# Patient Record
Sex: FEMALE | Race: WHITE | NOT HISPANIC OR LATINO | Employment: PART TIME | URBAN - METROPOLITAN AREA
[De-identification: names, ages, dates, MRNs, and addresses within clinical notes are randomized per-mention and may not be internally consistent; named-entity substitution may affect disease eponyms.]

---

## 2017-01-18 ENCOUNTER — RESOLUTE PROFESSIONAL BILLING HOSPITAL PROF FEE (OUTPATIENT)
Dept: HOSPITALIST | Facility: MEDICAL CENTER | Age: 27
End: 2017-01-18
Payer: MEDICAID

## 2017-01-18 ENCOUNTER — HOSPITAL ENCOUNTER (INPATIENT)
Facility: MEDICAL CENTER | Age: 27
LOS: 1 days | DRG: 639 | End: 2017-01-19
Attending: EMERGENCY MEDICINE | Admitting: INTERNAL MEDICINE
Payer: MEDICAID

## 2017-01-18 ENCOUNTER — APPOINTMENT (OUTPATIENT)
Dept: RADIOLOGY | Facility: MEDICAL CENTER | Age: 27
DRG: 639 | End: 2017-01-18
Attending: EMERGENCY MEDICINE
Payer: MEDICAID

## 2017-01-18 DIAGNOSIS — R11.2 NAUSEA VOMITING AND DIARRHEA: ICD-10-CM

## 2017-01-18 DIAGNOSIS — R19.7 NAUSEA VOMITING AND DIARRHEA: ICD-10-CM

## 2017-01-18 DIAGNOSIS — E10.10 DIABETIC KETOACIDOSIS WITHOUT COMA ASSOCIATED WITH TYPE 1 DIABETES MELLITUS (HCC): ICD-10-CM

## 2017-01-18 LAB
ALBUMIN SERPL BCP-MCNC: 3.6 G/DL (ref 3.2–4.9)
ALBUMIN/GLOB SERPL: 1.2 G/DL
ALP SERPL-CCNC: 99 U/L (ref 30–99)
ALT SERPL-CCNC: 17 U/L (ref 2–50)
ANION GAP SERPL CALC-SCNC: 15 MMOL/L (ref 0–11.9)
ANION GAP SERPL CALC-SCNC: 7 MMOL/L (ref 0–11.9)
APPEARANCE UR: CLEAR
AST SERPL-CCNC: 15 U/L (ref 12–45)
B-OH-BUTYR SERPL-MCNC: 5.49 MMOL/L (ref 0.02–0.27)
BASE EXCESS BLDV CALC-SCNC: -11 MMOL/L
BASOPHILS # BLD AUTO: 0.4 % (ref 0–1.8)
BASOPHILS # BLD: 0.02 K/UL (ref 0–0.12)
BILIRUB SERPL-MCNC: 0.6 MG/DL (ref 0.1–1.5)
BILIRUB UR QL STRIP.AUTO: NEGATIVE
BODY TEMPERATURE: ABNORMAL CENTIGRADE
BUN SERPL-MCNC: 13 MG/DL (ref 8–22)
BUN SERPL-MCNC: 18 MG/DL (ref 8–22)
CALCIUM SERPL-MCNC: 7.3 MG/DL (ref 8.5–10.5)
CALCIUM SERPL-MCNC: 8.4 MG/DL (ref 8.5–10.5)
CHLORIDE SERPL-SCNC: 105 MMOL/L (ref 96–112)
CHLORIDE SERPL-SCNC: 115 MMOL/L (ref 96–112)
CO2 SERPL-SCNC: 13 MMOL/L (ref 20–33)
CO2 SERPL-SCNC: 18 MMOL/L (ref 20–33)
COLOR UR: COLORLESS
CREAT SERPL-MCNC: 0.46 MG/DL (ref 0.5–1.4)
CREAT SERPL-MCNC: 0.67 MG/DL (ref 0.5–1.4)
CULTURE IF INDICATED INDCX: NO UA CULTURE
EOSINOPHIL # BLD AUTO: 0.18 K/UL (ref 0–0.51)
EOSINOPHIL NFR BLD: 3.7 % (ref 0–6.9)
ERYTHROCYTE [DISTWIDTH] IN BLOOD BY AUTOMATED COUNT: 44 FL (ref 35.9–50)
GFR SERPL CREATININE-BSD FRML MDRD: >60 ML/MIN/1.73 M 2
GFR SERPL CREATININE-BSD FRML MDRD: >60 ML/MIN/1.73 M 2
GLOBULIN SER CALC-MCNC: 3 G/DL (ref 1.9–3.5)
GLUCOSE BLD-MCNC: 206 MG/DL (ref 65–99)
GLUCOSE BLD-MCNC: 243 MG/DL (ref 65–99)
GLUCOSE BLD-MCNC: 40 MG/DL (ref 65–99)
GLUCOSE BLD-MCNC: 81 MG/DL (ref 65–99)
GLUCOSE SERPL-MCNC: 378 MG/DL (ref 65–99)
GLUCOSE SERPL-MCNC: 92 MG/DL (ref 65–99)
GLUCOSE UR STRIP.AUTO-MCNC: >1000 MG/DL
HCG SERPL QL: NEGATIVE
HCO3 BLDV-SCNC: 14 MMOL/L (ref 24–28)
HCT VFR BLD AUTO: 45 % (ref 37–47)
HGB BLD-MCNC: 14.8 G/DL (ref 12–16)
IMM GRANULOCYTES # BLD AUTO: 0.03 K/UL (ref 0–0.11)
IMM GRANULOCYTES NFR BLD AUTO: 0.6 % (ref 0–0.9)
KETONES UR STRIP.AUTO-MCNC: 100 MG/DL
LEUKOCYTE ESTERASE UR QL STRIP.AUTO: NEGATIVE
LIPASE SERPL-CCNC: 7 U/L (ref 11–82)
LYMPHOCYTES # BLD AUTO: 1.5 K/UL (ref 1–4.8)
LYMPHOCYTES NFR BLD: 30.9 % (ref 22–41)
MAGNESIUM SERPL-MCNC: 1.7 MG/DL (ref 1.5–2.5)
MAGNESIUM SERPL-MCNC: 2 MG/DL (ref 1.5–2.5)
MCH RBC QN AUTO: 27.8 PG (ref 27–33)
MCHC RBC AUTO-ENTMCNC: 32.9 G/DL (ref 33.6–35)
MCV RBC AUTO: 84.6 FL (ref 81.4–97.8)
MICRO URNS: ABNORMAL
MONOCYTES # BLD AUTO: 0.5 K/UL (ref 0–0.85)
MONOCYTES NFR BLD AUTO: 10.3 % (ref 0–13.4)
NEUTROPHILS # BLD AUTO: 2.62 K/UL (ref 2–7.15)
NEUTROPHILS NFR BLD: 54.1 % (ref 44–72)
NITRITE UR QL STRIP.AUTO: NEGATIVE
NRBC # BLD AUTO: 0 K/UL
NRBC BLD AUTO-RTO: 0 /100 WBC
PCO2 BLDV: 29.1 MMHG (ref 41–51)
PH BLDV: 7.29 [PH] (ref 7.31–7.45)
PH UR STRIP.AUTO: 5.5 [PH]
PHOSPHATE SERPL-MCNC: 1.3 MG/DL (ref 2.5–4.5)
PHOSPHATE SERPL-MCNC: 2.6 MG/DL (ref 2.5–4.5)
PLATELET # BLD AUTO: 241 K/UL (ref 164–446)
PMV BLD AUTO: 10.2 FL (ref 9–12.9)
PO2 BLDV: 37.9 MMHG (ref 25–40)
POTASSIUM SERPL-SCNC: 3.2 MMOL/L (ref 3.6–5.5)
POTASSIUM SERPL-SCNC: 4.3 MMOL/L (ref 3.6–5.5)
PROT SERPL-MCNC: 6.6 G/DL (ref 6–8.2)
PROT UR QL STRIP: NEGATIVE MG/DL
RBC # BLD AUTO: 5.32 M/UL (ref 4.2–5.4)
RBC UR QL AUTO: NEGATIVE
SAO2 % BLDV: 74.1 %
SODIUM SERPL-SCNC: 133 MMOL/L (ref 135–145)
SODIUM SERPL-SCNC: 140 MMOL/L (ref 135–145)
SP GR UR STRIP.AUTO: 1.03
WBC # BLD AUTO: 4.9 K/UL (ref 4.8–10.8)

## 2017-01-18 PROCEDURE — 80053 COMPREHEN METABOLIC PANEL: CPT | Mod: EDC

## 2017-01-18 PROCEDURE — 99223 1ST HOSP IP/OBS HIGH 75: CPT | Performed by: INTERNAL MEDICINE

## 2017-01-18 PROCEDURE — 36415 COLL VENOUS BLD VENIPUNCTURE: CPT | Mod: EDC

## 2017-01-18 PROCEDURE — 700111 HCHG RX REV CODE 636 W/ 250 OVERRIDE (IP): Mod: EDC | Performed by: EMERGENCY MEDICINE

## 2017-01-18 PROCEDURE — 770022 HCHG ROOM/CARE - ICU (200): Mod: EDC

## 2017-01-18 PROCEDURE — A9270 NON-COVERED ITEM OR SERVICE: HCPCS | Mod: EDC | Performed by: INTERNAL MEDICINE

## 2017-01-18 PROCEDURE — 84703 CHORIONIC GONADOTROPIN ASSAY: CPT | Mod: EDC

## 2017-01-18 PROCEDURE — 700102 HCHG RX REV CODE 250 W/ 637 OVERRIDE(OP): Mod: EDC | Performed by: INTERNAL MEDICINE

## 2017-01-18 PROCEDURE — 700111 HCHG RX REV CODE 636 W/ 250 OVERRIDE (IP): Mod: EDC | Performed by: INTERNAL MEDICINE

## 2017-01-18 PROCEDURE — 81003 URINALYSIS AUTO W/O SCOPE: CPT | Mod: EDC

## 2017-01-18 PROCEDURE — 99291 CRITICAL CARE FIRST HOUR: CPT | Mod: EDC

## 2017-01-18 PROCEDURE — 71010 DX-CHEST-PORTABLE (1 VIEW): CPT

## 2017-01-18 PROCEDURE — 83690 ASSAY OF LIPASE: CPT | Mod: EDC

## 2017-01-18 PROCEDURE — 700105 HCHG RX REV CODE 258: Mod: EDC | Performed by: EMERGENCY MEDICINE

## 2017-01-18 PROCEDURE — 96374 THER/PROPH/DIAG INJ IV PUSH: CPT | Mod: EDC

## 2017-01-18 PROCEDURE — 84100 ASSAY OF PHOSPHORUS: CPT | Mod: EDC

## 2017-01-18 PROCEDURE — 85025 COMPLETE CBC W/AUTO DIFF WBC: CPT | Mod: EDC

## 2017-01-18 PROCEDURE — 83735 ASSAY OF MAGNESIUM: CPT | Mod: EDC

## 2017-01-18 PROCEDURE — 96361 HYDRATE IV INFUSION ADD-ON: CPT | Mod: EDC

## 2017-01-18 PROCEDURE — 700101 HCHG RX REV CODE 250: Mod: EDC | Performed by: INTERNAL MEDICINE

## 2017-01-18 PROCEDURE — 96375 TX/PRO/DX INJ NEW DRUG ADDON: CPT | Mod: EDC

## 2017-01-18 PROCEDURE — 82010 KETONE BODYS QUAN: CPT | Mod: EDC

## 2017-01-18 PROCEDURE — 82803 BLOOD GASES ANY COMBINATION: CPT | Mod: EDC

## 2017-01-18 PROCEDURE — 93005 ELECTROCARDIOGRAM TRACING: CPT | Mod: EDC | Performed by: INTERNAL MEDICINE

## 2017-01-18 PROCEDURE — 94760 N-INVAS EAR/PLS OXIMETRY 1: CPT | Mod: EDC

## 2017-01-18 PROCEDURE — 80048 BASIC METABOLIC PNL TOTAL CA: CPT | Mod: EDC

## 2017-01-18 PROCEDURE — 82962 GLUCOSE BLOOD TEST: CPT | Mod: EDC

## 2017-01-18 PROCEDURE — 700105 HCHG RX REV CODE 258: Mod: EDC | Performed by: INTERNAL MEDICINE

## 2017-01-18 RX ORDER — MAGNESIUM SULFATE HEPTAHYDRATE 40 MG/ML
2 INJECTION, SOLUTION INTRAVENOUS
Status: COMPLETED | OUTPATIENT
Start: 2017-01-18 | End: 2017-01-19

## 2017-01-18 RX ORDER — ATORVASTATIN CALCIUM 20 MG/1
20 TABLET, FILM COATED ORAL NIGHTLY
COMMUNITY
End: 2017-09-20

## 2017-01-18 RX ORDER — DEXTROSE AND SODIUM CHLORIDE 5; .45 G/100ML; G/100ML
INJECTION, SOLUTION INTRAVENOUS CONTINUOUS
Status: DISCONTINUED | OUTPATIENT
Start: 2017-01-18 | End: 2017-01-19

## 2017-01-18 RX ORDER — METOCLOPRAMIDE HYDROCHLORIDE 5 MG/5ML
10 SOLUTION ORAL
Status: ON HOLD | COMMUNITY
End: 2017-08-13

## 2017-01-18 RX ORDER — SODIUM CHLORIDE 9 MG/ML
2000 INJECTION, SOLUTION INTRAVENOUS ONCE
Status: COMPLETED | OUTPATIENT
Start: 2017-01-18 | End: 2017-01-18

## 2017-01-18 RX ORDER — MAGNESIUM SULFATE HEPTAHYDRATE 40 MG/ML
4 INJECTION, SOLUTION INTRAVENOUS
Status: DISCONTINUED | OUTPATIENT
Start: 2017-01-18 | End: 2017-01-19

## 2017-01-18 RX ORDER — ONDANSETRON 2 MG/ML
4 INJECTION INTRAMUSCULAR; INTRAVENOUS EVERY 4 HOURS PRN
Status: DISCONTINUED | OUTPATIENT
Start: 2017-01-18 | End: 2017-01-19 | Stop reason: HOSPADM

## 2017-01-18 RX ORDER — ONDANSETRON 4 MG/1
4 TABLET, ORALLY DISINTEGRATING ORAL EVERY 4 HOURS PRN
Status: DISCONTINUED | OUTPATIENT
Start: 2017-01-18 | End: 2017-01-19 | Stop reason: HOSPADM

## 2017-01-18 RX ORDER — IBUPROFEN 200 MG
600 TABLET ORAL EVERY 6 HOURS PRN
COMMUNITY
End: 2017-01-18

## 2017-01-18 RX ORDER — MORPHINE SULFATE 4 MG/ML
2 INJECTION, SOLUTION INTRAMUSCULAR; INTRAVENOUS EVERY 4 HOURS PRN
Status: DISCONTINUED | OUTPATIENT
Start: 2017-01-18 | End: 2017-01-19 | Stop reason: HOSPADM

## 2017-01-18 RX ORDER — INSULIN GLARGINE 100 [IU]/ML
25 INJECTION, SOLUTION SUBCUTANEOUS 2 TIMES DAILY
Status: DISCONTINUED | OUTPATIENT
Start: 2017-01-18 | End: 2017-01-19

## 2017-01-18 RX ORDER — ACETAMINOPHEN 325 MG/1
650 TABLET ORAL EVERY 6 HOURS PRN
Status: DISCONTINUED | OUTPATIENT
Start: 2017-01-18 | End: 2017-01-19 | Stop reason: HOSPADM

## 2017-01-18 RX ORDER — SODIUM CHLORIDE 9 MG/ML
INJECTION, SOLUTION INTRAVENOUS CONTINUOUS
Status: DISCONTINUED | OUTPATIENT
Start: 2017-01-18 | End: 2017-01-19

## 2017-01-18 RX ORDER — POTASSIUM CHLORIDE 7.45 MG/ML
10 INJECTION INTRAVENOUS
Status: COMPLETED | OUTPATIENT
Start: 2017-01-18 | End: 2017-01-19

## 2017-01-18 RX ORDER — SODIUM CHLORIDE 9 MG/ML
1000 INJECTION, SOLUTION INTRAVENOUS ONCE
Status: COMPLETED | OUTPATIENT
Start: 2017-01-18 | End: 2017-01-18

## 2017-01-18 RX ORDER — DEXTROSE MONOHYDRATE 25 G/50ML
25 INJECTION, SOLUTION INTRAVENOUS
Status: DISCONTINUED | OUTPATIENT
Start: 2017-01-18 | End: 2017-01-19 | Stop reason: HOSPADM

## 2017-01-18 RX ORDER — DEXTROSE AND SODIUM CHLORIDE 10; .45 G/100ML; G/100ML
INJECTION, SOLUTION INTRAVENOUS CONTINUOUS
Status: DISPENSED | OUTPATIENT
Start: 2017-01-18 | End: 2017-01-19

## 2017-01-18 RX ORDER — ONDANSETRON 2 MG/ML
4 INJECTION INTRAMUSCULAR; INTRAVENOUS ONCE
Status: COMPLETED | OUTPATIENT
Start: 2017-01-18 | End: 2017-01-18

## 2017-01-18 RX ADMIN — HYDROMORPHONE HYDROCHLORIDE 1 MG: 1 INJECTION, SOLUTION INTRAMUSCULAR; INTRAVENOUS; SUBCUTANEOUS at 13:46

## 2017-01-18 RX ADMIN — SODIUM CHLORIDE 2000 ML: 9 INJECTION, SOLUTION INTRAVENOUS at 18:34

## 2017-01-18 RX ADMIN — SODIUM CHLORIDE 1000 ML: 9 INJECTION, SOLUTION INTRAVENOUS at 13:46

## 2017-01-18 RX ADMIN — SODIUM CHLORIDE 8 UNITS/HR: 9 INJECTION, SOLUTION INTRAVENOUS at 20:17

## 2017-01-18 RX ADMIN — ACETAMINOPHEN 650 MG: 325 TABLET, FILM COATED ORAL at 20:16

## 2017-01-18 RX ADMIN — ONDANSETRON 4 MG: 2 INJECTION, SOLUTION INTRAMUSCULAR; INTRAVENOUS at 13:46

## 2017-01-18 RX ADMIN — DEXTROSE AND SODIUM CHLORIDE: 10; .45 INJECTION, SOLUTION INTRAVENOUS at 22:58

## 2017-01-18 RX ADMIN — DEXTROSE MONOHYDRATE 25 ML: 500 INJECTION PARENTERAL at 23:00

## 2017-01-18 RX ADMIN — INSULIN HUMAN 7 UNITS: 100 INJECTION, SOLUTION PARENTERAL at 18:32

## 2017-01-18 RX ADMIN — MORPHINE SULFATE 2 MG: 4 INJECTION INTRAVENOUS at 22:55

## 2017-01-18 RX ADMIN — POTASSIUM CHLORIDE 10 MEQ: 7.46 INJECTION, SOLUTION INTRAVENOUS at 23:08

## 2017-01-18 ASSESSMENT — LIFESTYLE VARIABLES: DO YOU DRINK ALCOHOL: NO

## 2017-01-18 ASSESSMENT — PAIN SCALES - GENERAL
PAINLEVEL_OUTOF10: 7
PAINLEVEL_OUTOF10: 2
PAINLEVEL_OUTOF10: 8
PAINLEVEL_OUTOF10: 7

## 2017-01-18 ASSESSMENT — PATIENT HEALTH QUESTIONNAIRE - PHQ9
SUM OF ALL RESPONSES TO PHQ9 QUESTIONS 1 AND 2: 0
1. LITTLE INTEREST OR PLEASURE IN DOING THINGS: NOT AT ALL
2. FEELING DOWN, DEPRESSED, IRRITABLE, OR HOPELESS: NOT AT ALL
SUM OF ALL RESPONSES TO PHQ QUESTIONS 1-9: 0

## 2017-01-18 NOTE — ED NOTES
"Med rec updated and complete  Allergies reviewed  Pt was not sure of her cholesterol.  Called [facility] @ [phone] to verify name and strength.  Pt states \"No antibiotics in the last 30 days\".       "

## 2017-01-18 NOTE — ED PROVIDER NOTES
ED Provider Note    Scribed for Jeb Torres M.D. by Esperanza Sierra. 1/18/2017  12:32 PM    Primary care provider: Pcp Not In Computer  Means of arrival: Walk in  History obtained from: patient  History limited by: none    CHIEF COMPLAINT  Chief Complaint   Patient presents with   • N/V   • Low Back Pain   • Abdominal Pain       HPI  Alis Sparks is a 26 y.o. female who presents to the Emergency Department for vomiting, low back pain, and abdominal pain onset 2 days ago. She tells me her pain is severe and worsening today. She describes multiple episodes of vomiting since onset. She reports associated diarrhea that subsided yesterday. The patient denies any dysuria. She reports she is a diabetic who uses insulin. She tells me her sugars have been between 200-275 this week. She reports no other pertinent medical information.      REVIEW OF SYSTEMS  Pertinent positives include vomiting, diarrhea, low back pain, and abdominal pain.   Pertinent negatives include no trouble breathing.    All other systems reviewed and negative.      PAST MEDICAL HISTORY   has a past medical history of Diabetes type 1, controlled; DKA (diabetic ketoacidoses); UTI (urinary tract infection); Kidney infection; Pneumonia; Backpain; Bronchitis; Fall; and Gastroparesis.    SURGICAL HISTORY   has past surgical history that includes gastroscopy-endo (9/18/2014); gastroscopy with biopsy (9/18/2014); and other.    SOCIAL HISTORY  Social History   Substance Use Topics   • Smoking status: Never Smoker    • Smokeless tobacco: Never Used   • Alcohol Use: No      History   Drug Use No       FAMILY HISTORY  Family History   Problem Relation Age of Onset   • Cancer       breasts, multiple family members       CURRENT MEDICATIONS  No current facility-administered medications on file prior to encounter.     Current Outpatient Prescriptions on File Prior to Encounter   Medication Sig Dispense Refill   • insulin glargine (LANTUS) 100 UNIT/ML  "Solution Inject 25 Units as instructed 2 times a day.     • insulin aspart (NOVOLOG) 100 UNIT/ML Solution Inject  as instructed 3 times a day before meals. 1 unit for every 6 grams of carbohydrate  1 unit for every 50 mg/dL > 150 mg/dL     • metoclopramide (REGLAN) 10 MG Tab Take 10 mg by mouth 3 times a day before meals.        ALLERGIES  Allergies   Allergen Reactions   • Pcn [Penicillins] Shortness of Breath and Swelling     Per patient's Mom, patient tolerates Keflex   • Promethazine Vomiting   • Lisinopril      Per historical: Reported pedal swelling. No facial/angioedema or rash nor respiratory symptoms.        PHYSICAL EXAM  VITAL SIGNS: /92 mmHg  Pulse 121  Temp(Src) 36.3 °C (97.3 °F)  Resp 20  Ht 1.651 m (5' 5\")  Wt 77 kg (169 lb 12.1 oz)  BMI 28.25 kg/m2  SpO2 100%    Nursing note and vitals reviewed.  Constitutional: Mild distress secondary to pain. Well-developed and well-nourished.  HENT: Dry mucous membranes. Head is normocephalic and atraumatic. Oropharynx is clear without exudate or erythema.   Eyes: Pupils are equal, round, and reactive to light. Conjunctiva are normal.   Cardiovascular: Tachycardic rate and normal regular rhythm. No murmur heard. Normal radial pulses.  Pulmonary/Chest: Breath sounds normal. No wheezes or rales.   Abdominal: Mild tenderness below the umbilicus. No tenderness at Mcburney's point. Soft and non-tender. No distention    Musculoskeletal: Extremities exhibit normal range of motion without edema or tenderness.   Neurological: Awake, alert and oriented to person, place, and time. No focal deficits noted.  Skin: Skin is warm and dry. No rash.   Psychiatric: Normal mood and affect. Appropriate for clinical situation    DIAGNOSTIC STUDIES / PROCEDURES    LABS  Results for orders placed or performed during the hospital encounter of 01/18/17   CBC WITH DIFFERENTIAL   Result Value Ref Range    WBC 4.9 4.8 - 10.8 K/uL    RBC 5.32 4.20 - 5.40 M/uL    Hemoglobin 14.8 " 12.0 - 16.0 g/dL    Hematocrit 45.0 37.0 - 47.0 %    MCV 84.6 81.4 - 97.8 fL    MCH 27.8 27.0 - 33.0 pg    MCHC 32.9 (L) 33.6 - 35.0 g/dL    RDW 44.0 35.9 - 50.0 fL    Platelet Count 241 164 - 446 K/uL    MPV 10.2 9.0 - 12.9 fL    Neutrophils-Polys 54.10 44.00 - 72.00 %    Lymphocytes 30.90 22.00 - 41.00 %    Monocytes 10.30 0.00 - 13.40 %    Eosinophils 3.70 0.00 - 6.90 %    Basophils 0.40 0.00 - 1.80 %    Immature Granulocytes 0.60 0.00 - 0.90 %    Nucleated RBC 0.00 /100 WBC    Neutrophils (Absolute) 2.62 2.00 - 7.15 K/uL    Lymphs (Absolute) 1.50 1.00 - 4.80 K/uL    Monos (Absolute) 0.50 0.00 - 0.85 K/uL    Eos (Absolute) 0.18 0.00 - 0.51 K/uL    Baso (Absolute) 0.02 0.00 - 0.12 K/uL    Immature Granulocytes (abs) 0.03 0.00 - 0.11 K/uL    NRBC (Absolute) 0.00 K/uL   COMP METABOLIC PANEL   Result Value Ref Range    Sodium 133 (L) 135 - 145 mmol/L    Potassium 4.3 3.6 - 5.5 mmol/L    Chloride 105 96 - 112 mmol/L    Co2 13 (L) 20 - 33 mmol/L    Anion Gap 15.0 (H) 0.0 - 11.9    Glucose 378 (H) 65 - 99 mg/dL    Bun 18 8 - 22 mg/dL    Creatinine 0.67 0.50 - 1.40 mg/dL    Calcium 8.4 (L) 8.5 - 10.5 mg/dL    AST(SGOT) 15 12 - 45 U/L    ALT(SGPT) 17 2 - 50 U/L    Alkaline Phosphatase 99 30 - 99 U/L    Total Bilirubin 0.6 0.1 - 1.5 mg/dL    Albumin 3.6 3.2 - 4.9 g/dL    Total Protein 6.6 6.0 - 8.2 g/dL    Globulin 3.0 1.9 - 3.5 g/dL    A-G Ratio 1.2 g/dL   URINALYSIS,CULTURE IF INDICATED   Result Value Ref Range    Color Colorless     Character Clear     Specific Gravity 1.027 <1.035    Ph 5.5 5.0-8.0    Glucose >1000 (A) Negative mg/dL    Ketones 100 (A) Negative mg/dL    Protein Negative Negative mg/dL    Bilirubin Negative Negative    Nitrite Negative Negative    Leukocyte Esterase Negative Negative    Occult Blood Negative Negative    Micro Urine Req see below     Culture Indicated No UA Culture   VENOUS BLOOD GAS   Result Value Ref Range    Venous Bg Ph 7.29 (L) 7.31 - 7.45    Venous Bg Pco2 29.1 (L) 41.0 - 51.0 mmHg     Venous Bg Po2 37.9 25.0 - 40.0 mmHg    Venous Bg O2 Saturation 74.1 %    Venous Bg Hco3 14 (L) 24 - 28 mmol/L    Venous Bg Base Excess -11 mmol/L    Body Temp see below Centigrade   LIPASE   Result Value Ref Range    Lipase 7 (L) 11 - 82 U/L   HCG QUAL SERUM   Result Value Ref Range    Beta-Hcg Qualitative Serum Negative Negative   ESTIMATED GFR   Result Value Ref Range    GFR If African American >60 >60 mL/min/1.73 m 2    GFR If Non African American >60 >60 mL/min/1.73 m 2      All labs reviewed by me.    RADIOLOGY  DX-CHEST-PORTABLE (1 VIEW)   Final Result         1. No acute cardiopulmonary abnormalities are identified.        The radiologist's interpretation of all radiological studies have been reviewed by me.    COURSE & MEDICAL DECISION MAKING  Nursing notes, VS, PMSFHx reviewed in chart.     12:32 PM - Patient seen and examined at bedside. Patient will be treated with 1 L NS infusion, 4 mg zofran injection, and 1 mg dilaudid injection. Ordered chest xray, HCG qual, CBC, CMP, UA, venous blood gas, beta-hydroxybutyric acid, and lipase to evaluate her symptoms. The differential diagnoses include but are not limited to: DKA, UTI, electrolyte abnormality.      1:20 PM - I reviewed her xray results which were unremarkable.     2:33 PM - Paged Hospitalist.     2:40 PM - Hospitalist responded, Dr. Guerra will admit the patient.     The patient presents today with mild diabetic ketoacidosis. She has persistent vomiting and diarrhea. The patient will be admitted the hospital for further evaluation and treatment. The patient has been treated with a fluid bolus in the emergency department. I spoke with the hospitalist who will initiate insulin therapy.    DISPOSITION:  Patient will be admitted to Dr. Guerra in guarded condition.     FINAL IMPRESSION  1. Diabetic ketoacidosis without coma associated with type 1 diabetes mellitus (CMS-HCC)    2. Nausea vomiting and diarrhea          IEsperanza), am  scribing for, and in the presence of, Jeb Torres M.D..    Electronically signed by: Esperanza Sierra (Scribe), 1/18/2017    I, Jeb Torres M.D. personally performed the services described in this documentation, as scribed by Esperanza Sierra in my presence, and it is both accurate and complete.    The note accurately reflects work and decisions made by me.  Jeb Torres  1/18/2017  3:17 PM

## 2017-01-18 NOTE — IP AVS SNAPSHOT
1/19/2017          Alis Sparks  [address]  [place] [address]    Dear Alis:    [facility] wants to ensure your discharge home is safe and you or your loved ones have had all your questions answered regarding your care after you leave the hospital.    You may receive a telephone call within two days of your discharge.  This call is to make certain you understand your discharge instructions as well as ensure we provided you with the best care possible during your stay with us.     The call will only last approximately 3-5 minutes and will be done by a nurse.    Once again, we want to ensure your discharge home is safe and that you have a clear understanding of any next steps in your care.  If you have any questions or concerns, please do not hesitate to contact us, we are here for you.  Thank you for choosing [facility] for your healthcare needs.    Sincerely,    Andres Beckwith    [facility] [place] [facility]

## 2017-01-18 NOTE — ED NOTES
Bib family c/o intermittent nvd since Monday assoc w malia sadler, denies dysuria, states she is hardly even voiding, is iddm w fsbs 200-275, hr 121

## 2017-01-18 NOTE — IP AVS SNAPSHOT
ACE Portal Access Code: [email or web address]  Expires: 1/27/2017 12:27 PM    ACE Portal  A secure, online tool to manage your health information     [employer]’s ACE Portal® is a secure, online tool that connects you to your personalized health information from the privacy of your home -- day or night - making it very easy for you to manage your healthcare. Once the activation process is completed, you can even access your medical information using the ACE Portal leyda, which is available for free in the Apple Leyda store or Google Play store.     ACE Portal provides the following levels of access (as shown below):   My Chart Features   [facility] Primary Care Doctor [facility]  Specialists [facility]  Urgent  Care Non-[facility]  Primary Care  Doctor   Email your healthcare team securely and privately 24/7 X X X X   Manage appointments: schedule your next appointment; view details of past/upcoming appointments X      Request prescription refills. X      View recent personal medical records, including lab and immunizations X X X X   View health record, including health history, allergies, medications X X X X   Read reports about your outpatient visits, procedures, consult and ER notes X X X X   See your discharge summary, which is a recap of your hospital and/or ER visit that includes your diagnosis, lab results, and care plan. X X       How to register for ACE Portal:  1. Go to  [email or web address].  2. Click on the Sign Up Now box, which takes you to the New Member Sign Up page. You will need to provide the following information:  a. Enter your ACE Portal Access Code exactly as it appears at the top of this page. (You will not need to use this code after you’ve completed the sign-up process. If you do not sign up before the expiration date, you must request a new code.)   b. Enter your date of birth.   c. Enter your home email address.   d. Click Submit, and follow the next screen’s instructions.  3. Create a ACE Portal ID. This will be your ACE Portal  login ID and cannot be changed, so think of one that is secure and easy to remember.  4. Create a [employer] password. You can change your password at any time.  5. Enter your Password Reset Question and Answer. This can be used at a later time if you forget your password.   6. Enter your e-mail address. This allows you to receive e-mail notifications when new information is available in Loterity.  7. Click Sign Up. You can now view your health information.    For assistance activating your [employer] account, call [phone]

## 2017-01-18 NOTE — IP AVS SNAPSHOT
" <p align=\"LEFT\"><IMG SRC=\"[email or web address]\" alt=\"Image\" WIDTH=\"50%\" HEIGHT=\"200\" BORDER=\"\"></p>                   Name:Alis Sparks  Medical Record Number:[id number]  CSN: [id number]    YOB: 1990   Age: 26 y.o.  Sex: female  HT:1.651 m (5' 5\") WT: 77 kg (169 lb 12.1 oz)          Admit Date: 1/18/2017     Discharge Date:   Today's Date: 1/19/2017  Attending Doctor:  Michael Guerra M.D.                  Allergies:  Pcn; Promethazine; and Lisinopril          Follow-up Information     1. Follow up with [facility]. Schedule an appointment as soon as possible for a visit in 1 month.    Contact information    [address] [place] [address] [id number] [phone]         Medication List      Take these Medications        Instructions    atorvastatin 20 MG Tabs   Commonly known as:  LIPITOR    Take 20 mg by mouth every evening.   Dose:  20 mg       CHILDRENS ADVIL PO    Take 3 Tabs by mouth as needed (For pain).   Dose:  3 Tab       insulin glargine 100 UNIT/ML Soln   Commonly known as:  LANTUS    Inject 25 Units as instructed 2 times a day.   Dose:  25 Units       metoclopramide 5 MG/5ML Soln   Commonly known as:  REGLAN    Take 10 mg by mouth 3 times a day, with meals.   Dose:  10 mg       NOVOLOG 100 UNIT/ML Soln   Generic drug:  insulin aspart    Inject 1-6 Units as instructed 3 times a day before meals. 1 unit for every 6 grams of carbohydrate 1 unit for every 50 mg/dL > 150 mg/dL   Dose:  1-6 Units       ondansetron 4 MG Tbdp   Commonly known as:  ZOFRAN ODT    Take 1 Tab by mouth every four hours as needed for Nausea/Vomiting.   Dose:  4 mg       VITAMIN D PO    Take 1 Tab by mouth every evening.   Dose:  1 Tab         "

## 2017-01-18 NOTE — IP AVS SNAPSHOT
" After Visit Summary                                                                                                                  Name:Alis Sparks  Medical Record Number:[id number]  CSN: [id number]    YOB: 1990   Age: 26 y.o.  Sex: female  HT:1.651 m (5' 5\") WT: 77 kg (169 lb 12.1 oz)          Admit Date: 1/18/2017     Discharge Date:   Today's Date: 1/19/2017  Attending Doctor:  Michael Guerra M.D.                  Allergies:  Pcn; Promethazine; and Lisinopril            Discharge Instructions       Discharge Instructions    Discharged to home by car with relative. Discharged via walking, hospital escort: Refused.  Special equipment needed: Not Applicable    Be sure to schedule a follow-up appointment with your primary care doctor or any specialists as instructed.     Discharge Plan:   Diet Plan: Discussed  Activity Level: Discussed  Confirmed Follow up Appointment: Patient to Call and Schedule Appointment  Medication Reconciliation Updated: Yes  Influenza Vaccine Indication: Not indicated: Previously immunized this influenza season and > 8 years of age    I understand that a diet low in cholesterol, fat, and sodium is recommended for good health. Unless I have been given specific instructions below for another diet, I accept this instruction as my diet prescription.   Other diet: Diabetic    Special Instructions: None    · Is patient discharged on Warfarin / Coumadin?   No     · Is patient Post Blood Transfusion?  No    Depression / Suicide Risk    As you are discharged from this [facility] facility, it is important to learn how to keep safe from harming yourself.    Recognize the warning signs:  · Abrupt changes in personality, positive or negative- including increase in energy   · Giving away possessions  · Change in eating patterns- significant weight changes-  positive or negative  · Change in sleeping patterns- unable to sleep or sleeping all the time   · Unwillingness or inability to " communicate  · Depression  · Unusual sadness, discouragement and loneliness  · Talk of wanting to die  · Neglect of personal appearance   · Rebelliousness- reckless behavior  · Withdrawal from people/activities they love  · Confusion- inability to concentrate     If you or a loved one observes any of these behaviors or has concerns about self-harm, here's what you can do:  · Talk about it- your feelings and reasons for harming yourself  · Remove any means that you might use to hurt yourself (examples: pills, rope, extension cords, firearm)  · Get professional help from the community (Mental Health, Substance Abuse, psychological counseling)  · Do not be alone:Call your Safe Contact- someone whom you trust who will be there for you.  · Call your local CRISIS HOTLINE 631-6504 or 866-279-9006  · Call your local Children's Mobile Crisis Response Team [place] (568) 003-4059 or www.Handpay  · Call the toll free National Suicide Prevention Hotlines   · National Suicide Prevention Lifeline 422-207-QPVH (4808)  · National Hope Line Network 800-SUICIDE (544-7316)        Follow-up Information     1. Follow up with [facility]. Schedule an appointment as soon as possible for a visit in 1 month.    Contact information    [address] [address] [phone]         Discharge Medication Instructions:    Below are the medications your physician expects you to take upon discharge:    Review all your home medications and newly ordered medications with your doctor and/or pharmacist. Follow medication instructions as directed by your doctor and/or pharmacist.    Please keep your medication list with you and share with your physician.               Medication List      START taking these medications        Instructions    ondansetron 4 MG Tbdp   Commonly known as:  ZOFRAN ODT    Take 1 Tab by mouth every four hours as needed for Nausea/Vomiting.   Dose:  4 mg         CONTINUE taking these  medications        Instructions    atorvastatin 20 MG Tabs   Commonly known as:  LIPITOR    Take 20 mg by mouth every evening.   Dose:  20 mg       CHILDRENS ADVIL PO    Take 3 Tabs by mouth as needed (For pain).   Dose:  3 Tab       insulin glargine 100 UNIT/ML Soln   Last time this was given:  25 Units on 1/19/2017  1:54 PM   Commonly known as:  LANTUS    Inject 25 Units as instructed 2 times a day.   Dose:  25 Units       metoclopramide 5 MG/5ML Soln   Commonly known as:  REGLAN    Take 10 mg by mouth 3 times a day, with meals.   Dose:  10 mg       NOVOLOG 100 UNIT/ML Soln   Generic drug:  insulin aspart    Inject 1-6 Units as instructed 3 times a day before meals. 1 unit for every 6 grams of carbohydrate 1 unit for every 50 mg/dL > 150 mg/dL   Dose:  1-6 Units       VITAMIN D PO    Take 1 Tab by mouth every evening.   Dose:  1 Tab               Instructions           Diet / Nutrition:    Follow any diet instructions given to you by your doctor or the dietician, including how much salt (sodium) you are allowed each day.    If you are overweight, talk to your doctor about a weight reduction plan.    Activity:    Remain physically active following your doctor's instructions about exercise and activity.    Rest often.     Any time you become even a little tired or short of breath, SIT DOWN and rest.    Worsening Symptoms:    Report any of the following signs and symptoms to the doctor's office immediately:    *Pain of jaw, arm, or neck  *Chest pain not relieved by medication                               *Dizziness or loss of consciousness  *Difficulty breathing even when at rest   *More tired than usual                                       *Bleeding drainage or swelling of surgical site  *Swelling of feet, ankles, legs or stomach                 *Fever (>100ºF)  *Pink or blood tinged sputum  *Weight gain (3lbs/day or 5lbs /week)           *Shock from internal defibrillator (if applicable)  *Palpitations or  irregular heartbeats                *Cool and/or numb extremities    Stroke Awareness    Common Risk Factors for Stroke include:    Age  Atrial Fibrillation  Carotid Artery Stenosis  Diabetes Mellitus  Excessive alcohol consumption  High blood pressure  Overweight   Physical inactivity  Smoking    Warning signs and symptoms of a stroke include:    *Sudden numbness or weakness of the face, arm or leg (especially on one side of the body).  *Sudden confusion, trouble speaking or understanding.  *Sudden trouble seeing in one or both eyes.  *Sudden trouble walking, dizziness, loss of balance or coordination.Sudden severe headache with no known cause.    It is very important to get treatment quickly when a stroke occurs. If you experience any of the above warning signs, call 911 immediately.                   Disclaimer         Quit Smoking / Tobacco Use:    I understand the use of any tobacco products increases my chance of suffering from future heart disease or stroke and could cause other illnesses which may shorten my life. Quitting the use of tobacco products is the single most important thing I can do to improve my health. For further information on smoking / tobacco cessation call a Toll Free Quit Line at 1-282.258.4494 (*National Cancer Circle) or 1-736.664.6472 (American Lung Association) or you can access the web based program at www.lungusa.org.    [place] Tobacco Users Help Line:  (823) 178-4122       Toll Free: 1-916.271.7474  Quit Tobacco Program [facility] (387)638-3430    Crisis Hotline:    [place] Crisis Hotline:  2-556-TJRLNPY or 1-209.287.2385    [place] Crisis Hotline:    1-929.291.2761 or 554-222-3539    Discharge Survey:   Thank you for choosing [facility]. We hope we did everything we could to make your hospital stay a pleasant one. You may be receiving a phone survey and we would appreciate your time and participation in answering the questions. Your input is very  valuable to us in our efforts to improve our service to our patients and their families.        My signature on this form indicates that:    1. I have reviewed and understand the above information.  2. My questions regarding this information have been answered to my satisfaction.  3. I have formulated a plan with my discharge nurse to obtain my prescribed medications for home.                  Disclaimer         __________________________________                     __________       ________                       Patient Signature                                                 Date                    Time

## 2017-01-19 ENCOUNTER — APPOINTMENT (OUTPATIENT)
Dept: RADIOLOGY | Facility: MEDICAL CENTER | Age: 27
DRG: 639 | End: 2017-01-19
Attending: INTERNAL MEDICINE
Payer: MEDICAID

## 2017-01-19 VITALS
SYSTOLIC BLOOD PRESSURE: 108 MMHG | BODY MASS INDEX: 28.28 KG/M2 | HEIGHT: 65 IN | DIASTOLIC BLOOD PRESSURE: 72 MMHG | HEART RATE: 88 BPM | RESPIRATION RATE: 17 BRPM | WEIGHT: 169.75 LBS | OXYGEN SATURATION: 93 % | TEMPERATURE: 97.8 F

## 2017-01-19 LAB
ANION GAP SERPL CALC-SCNC: 8 MMOL/L (ref 0–11.9)
BUN SERPL-MCNC: 11 MG/DL (ref 8–22)
CALCIUM SERPL-MCNC: 7.4 MG/DL (ref 8.5–10.5)
CHLORIDE SERPL-SCNC: 115 MMOL/L (ref 96–112)
CO2 SERPL-SCNC: 17 MMOL/L (ref 20–33)
CREAT SERPL-MCNC: 0.43 MG/DL (ref 0.5–1.4)
GFR SERPL CREATININE-BSD FRML MDRD: >60 ML/MIN/1.73 M 2
GLUCOSE BLD-MCNC: 114 MG/DL (ref 65–99)
GLUCOSE BLD-MCNC: 175 MG/DL (ref 65–99)
GLUCOSE BLD-MCNC: 186 MG/DL (ref 65–99)
GLUCOSE BLD-MCNC: 231 MG/DL (ref 65–99)
GLUCOSE BLD-MCNC: 40 MG/DL (ref 65–99)
GLUCOSE BLD-MCNC: 56 MG/DL (ref 65–99)
GLUCOSE BLD-MCNC: 67 MG/DL (ref 65–99)
GLUCOSE BLD-MCNC: 67 MG/DL (ref 65–99)
GLUCOSE BLD-MCNC: 91 MG/DL (ref 65–99)
GLUCOSE SERPL-MCNC: 50 MG/DL (ref 65–99)
MAGNESIUM SERPL-MCNC: 2.3 MG/DL (ref 1.5–2.5)
PHOSPHATE SERPL-MCNC: 4.3 MG/DL (ref 2.5–4.5)
POTASSIUM SERPL-SCNC: 4.2 MMOL/L (ref 3.6–5.5)
SODIUM SERPL-SCNC: 140 MMOL/L (ref 135–145)

## 2017-01-19 PROCEDURE — 84100 ASSAY OF PHOSPHORUS: CPT | Mod: EDC

## 2017-01-19 PROCEDURE — 82962 GLUCOSE BLOOD TEST: CPT | Mod: 91,EDC

## 2017-01-19 PROCEDURE — 700111 HCHG RX REV CODE 636 W/ 250 OVERRIDE (IP): Mod: EDC | Performed by: HOSPITALIST

## 2017-01-19 PROCEDURE — 700102 HCHG RX REV CODE 250 W/ 637 OVERRIDE(OP): Mod: EDC | Performed by: INTERNAL MEDICINE

## 2017-01-19 PROCEDURE — 700101 HCHG RX REV CODE 250: Mod: EDC | Performed by: INTERNAL MEDICINE

## 2017-01-19 PROCEDURE — A9270 NON-COVERED ITEM OR SERVICE: HCPCS | Mod: EDC | Performed by: INTERNAL MEDICINE

## 2017-01-19 PROCEDURE — 700105 HCHG RX REV CODE 258: Mod: EDC | Performed by: INTERNAL MEDICINE

## 2017-01-19 PROCEDURE — 76700 US EXAM ABDOM COMPLETE: CPT

## 2017-01-19 PROCEDURE — 83735 ASSAY OF MAGNESIUM: CPT | Mod: EDC

## 2017-01-19 PROCEDURE — 99238 HOSP IP/OBS DSCHRG MGMT 30/<: CPT | Performed by: HOSPITALIST

## 2017-01-19 PROCEDURE — 80048 BASIC METABOLIC PNL TOTAL CA: CPT | Mod: EDC

## 2017-01-19 PROCEDURE — 700102 HCHG RX REV CODE 250 W/ 637 OVERRIDE(OP): Mod: EDC | Performed by: HOSPITALIST

## 2017-01-19 PROCEDURE — A9270 NON-COVERED ITEM OR SERVICE: HCPCS | Mod: EDC | Performed by: HOSPITALIST

## 2017-01-19 PROCEDURE — 700111 HCHG RX REV CODE 636 W/ 250 OVERRIDE (IP): Mod: EDC | Performed by: INTERNAL MEDICINE

## 2017-01-19 RX ORDER — METOCLOPRAMIDE HYDROCHLORIDE 5 MG/5ML
10 SOLUTION ORAL
Status: DISCONTINUED | OUTPATIENT
Start: 2017-01-19 | End: 2017-01-19

## 2017-01-19 RX ORDER — METOCLOPRAMIDE HYDROCHLORIDE 5 MG/ML
10 INJECTION INTRAMUSCULAR; INTRAVENOUS EVERY 6 HOURS
Status: DISCONTINUED | OUTPATIENT
Start: 2017-01-19 | End: 2017-01-19 | Stop reason: HOSPADM

## 2017-01-19 RX ORDER — ONDANSETRON 4 MG/1
4 TABLET, ORALLY DISINTEGRATING ORAL EVERY 4 HOURS PRN
Qty: 10 TAB | Refills: 3 | Status: SHIPPED | OUTPATIENT
Start: 2017-01-19 | End: 2017-05-04

## 2017-01-19 RX ORDER — INSULIN GLARGINE 100 [IU]/ML
25 INJECTION, SOLUTION SUBCUTANEOUS 2 TIMES DAILY
Status: DISCONTINUED | OUTPATIENT
Start: 2017-01-19 | End: 2017-01-19 | Stop reason: HOSPADM

## 2017-01-19 RX ORDER — INSULIN GLARGINE 100 [IU]/ML
25 INJECTION, SOLUTION SUBCUTANEOUS ONCE
Status: DISCONTINUED | OUTPATIENT
Start: 2017-01-19 | End: 2017-01-19

## 2017-01-19 RX ORDER — SODIUM CHLORIDE 9 MG/ML
INJECTION, SOLUTION INTRAVENOUS CONTINUOUS
Status: DISCONTINUED | OUTPATIENT
Start: 2017-01-19 | End: 2017-01-19 | Stop reason: HOSPADM

## 2017-01-19 RX ORDER — TRAMADOL HYDROCHLORIDE 50 MG/1
50 TABLET ORAL EVERY 6 HOURS PRN
Status: DISCONTINUED | OUTPATIENT
Start: 2017-01-19 | End: 2017-01-19 | Stop reason: HOSPADM

## 2017-01-19 RX ADMIN — INSULIN LISPRO 3 UNITS: 100 INJECTION, SOLUTION INTRAVENOUS; SUBCUTANEOUS at 12:11

## 2017-01-19 RX ADMIN — MORPHINE SULFATE 2 MG: 4 INJECTION INTRAVENOUS at 04:54

## 2017-01-19 RX ADMIN — MORPHINE SULFATE 2 MG: 4 INJECTION INTRAVENOUS at 08:55

## 2017-01-19 RX ADMIN — INSULIN LISPRO 3 UNITS: 100 INJECTION, SOLUTION INTRAVENOUS; SUBCUTANEOUS at 17:15

## 2017-01-19 RX ADMIN — INSULIN GLARGINE 25 UNITS: 100 INJECTION, SOLUTION SUBCUTANEOUS at 01:16

## 2017-01-19 RX ADMIN — TRAMADOL HYDROCHLORIDE 50 MG: 50 TABLET, COATED ORAL at 12:05

## 2017-01-19 RX ADMIN — SODIUM CHLORIDE: 9 INJECTION, SOLUTION INTRAVENOUS at 02:25

## 2017-01-19 RX ADMIN — POTASSIUM CHLORIDE 10 MEQ: 7.46 INJECTION, SOLUTION INTRAVENOUS at 02:23

## 2017-01-19 RX ADMIN — MAGNESIUM SULFATE IN WATER 2 G: 40 INJECTION, SOLUTION INTRAVENOUS at 02:35

## 2017-01-19 RX ADMIN — INSULIN GLARGINE 25 UNITS: 100 INJECTION, SOLUTION SUBCUTANEOUS at 13:54

## 2017-01-19 RX ADMIN — Medication 16 G: at 07:11

## 2017-01-19 RX ADMIN — POTASSIUM PHOSPHATE, MONOBASIC AND POTASSIUM PHOSPHATE, DIBASIC 30 MMOL: 224; 236 INJECTION, SOLUTION INTRAVENOUS at 03:33

## 2017-01-19 RX ADMIN — POTASSIUM CHLORIDE 10 MEQ: 7.46 INJECTION, SOLUTION INTRAVENOUS at 00:12

## 2017-01-19 RX ADMIN — SODIUM CHLORIDE: 9 INJECTION, SOLUTION INTRAVENOUS at 13:59

## 2017-01-19 RX ADMIN — METOCLOPRAMIDE 10 MG: 5 INJECTION, SOLUTION INTRAMUSCULAR; INTRAVENOUS at 12:05

## 2017-01-19 RX ADMIN — POTASSIUM CHLORIDE 10 MEQ: 7.46 INJECTION, SOLUTION INTRAVENOUS at 01:18

## 2017-01-19 ASSESSMENT — LIFESTYLE VARIABLES
EVER_SMOKED: NEVER
DO YOU DRINK ALCOHOL: NO

## 2017-01-19 ASSESSMENT — PAIN SCALES - GENERAL
PAINLEVEL_OUTOF10: 0
PAINLEVEL_OUTOF10: 8
PAINLEVEL_OUTOF10: 0
PAINLEVEL_OUTOF10: 0
PAINLEVEL_OUTOF10: 3
PAINLEVEL_OUTOF10: 0

## 2017-01-19 NOTE — CARE PLAN
Problem: Communication  Goal: The ability to communicate needs accurately and effectively will improve  Outcome: PROGRESSING AS EXPECTED

## 2017-01-19 NOTE — ED NOTES
Pt needing several IV access and has been poked many times.  Pt typically requires US-guided PIV placement.  Charge RN requesting placement of central line.

## 2017-01-19 NOTE — CARE PLAN
Problem: Safety  Goal: Will remain free from injury  Outcome: PROGRESSING AS EXPECTED  Pt assessed for mobility needs, provided education regarding use of call light.  Lower bed rails up, treaded socks in place, IV pole close to bathroom, bed alarm activated.  Pt calls appropriately.    Problem: Pain Management  Goal: Pain level will decrease to patient’s comfort goal  Outcome: PROGRESSING AS EXPECTED  Pain scale and comfort goal discussed with pt.  Pt educated on non-pharmacologic comfort measures.

## 2017-01-19 NOTE — CARE PLAN
Problem: Pain Management  Goal: Pain level will decrease to patient’s comfort goal  Outcome: PROGRESSING AS EXPECTED

## 2017-01-19 NOTE — DISCHARGE PLANNING
Discussed pt during AM Rounds. Pt is a 27 yo female that was admitted to [facility] on 1/18/2017 for DKA. Pt has had 3 admissions to [facility] in the last 60 days. Pt is a full code and does not have an AD. The pt's emergency contacts are her mother, Lena and her sister, Marycarmen. Faceheet indicate pt does not have a PCP. Pt is a Medicaid HMO which may limit access to dc resources. Pt discharge disposition will most likely be home.

## 2017-01-19 NOTE — ED NOTES
ICU here for pickup.  Unable to place another PIV by 2 RNs.  ICU to f/u w/ care/IV placement.  Pt to T 632 via gurney on monitor with ICU staff x 2.  VSS at transfer.

## 2017-01-19 NOTE — PROGRESS NOTES
REceived report and assumed care. Transported pt from ED with GRIFFIN Calloway via [facility]. VSS. PT feels nauseated and states abdominal and lower back pain 7/10.

## 2017-01-19 NOTE — ED NOTES
Pt arrived to R08, report from Britt CHAVIRA.  Pt care assumed.  2nd and 3rd liters NS hung per MAR.  7 unit insulin bolus dose given.  Pt on all monitoring equipment.

## 2017-01-19 NOTE — ED NOTES
Spoke with admit MD about central line.  No central line to be placed.  Requested floor staff be utilized if needed.

## 2017-01-19 NOTE — H&P
HOSPITAL MEDICINE HISTORY/ PHYSICAL    Date & Time note created:    1/19/2017   1:23 AM       Patient ID:   Name:             Alis Sparks     YOB: 1990  Age:                 26 y.o.  female   MRN:               [id number]    Admitting Attending:  Michael Guerra           Chief Complaint:       Nausea and Vomiting    History of Present Illness:    This is a 26 y.o. female with a past medical history of Diabetes Mellitus Type 1 and Gastroparesis who presents with nausea and vomiting since 3 days. Patient also noted low grade fever, chills, generalized abdominal pain and diarrhea. She denied any blood in stools. She stated that she was in close contact with her sister was sick on Monday with similar symptoms. She denied CP, shortness of breath, cough, dysuria, head ache or neck stiffness. Patient has been compliant with her insulin regimen.     In the ER her Glucose was found to be elevated at 378, Bicarb 13, AG 15, Beta hydroxy butyrate 5.49.      Review of Systems:      Please see HPI, all other systems were reviewed and are negative (AMA/CMS criteria)              Past Medical/ Family / Social history (PFSH):   Past Medical History   Diagnosis Date   • Diabetes type 1, controlled      tests 4-5 times daily   • DKA (diabetic ketoacidoses)    • UTI (urinary tract infection)    • Kidney infection    • Pneumonia    • Backpain    • Bronchitis    • Fall    • Gastroparesis      Past Surgical History   Procedure Laterality Date   • Gastroscopy-endo  9/18/2014     Performed by Sylvain PERRY M.D. at [facility]   • Gastroscopy with biopsy  9/18/2014     Performed by Sylvain PERRY M.D. at [facility]   • Other       surgery to patch lung after pneumor from central line placement     Current Outpatient Medications:  No current facility-administered medications on file prior to encounter.     Current Outpatient Prescriptions on File Prior to Encounter   Medication Sig Dispense Refill  "  • insulin glargine (LANTUS) 100 UNIT/ML Solution Inject 25 Units as instructed 2 times a day.     • insulin aspart (NOVOLOG) 100 UNIT/ML Solution Inject 1-6 Units as instructed 3 times a day before meals. 1 unit for every 6 grams of carbohydrate  1 unit for every 50 mg/dL > 150 mg/dL       Medication Allergy/Sensitivities:  Allergies   Allergen Reactions   • Pcn [Penicillins] Shortness of Breath and Swelling     Per patient's Mom, patient tolerates Keflex   • Promethazine Vomiting   • Lisinopril      Per historical: Reported pedal swelling. No facial/angioedema or rash nor respiratory symptoms.      Family History:  Family History   Problem Relation Age of Onset   • Cancer       breasts, multiple family members      Social History:  Social History   Substance Use Topics   • Smoking status: Never Smoker    • Smokeless tobacco: Never Used   • Alcohol Use: No         Physical Exam:   Vitals/ General Appearance:   Weight/BMI: Body mass index is 28.25 kg/(m^2).  Blood pressure 108/72, pulse 98, temperature 36.6 °C (97.9 °F), resp. rate 22, height 1.651 m (5' 5\"), weight 77 kg (169 lb 12.1 oz), SpO2 94 %.   Filed Vitals:    01/18/17 1945 01/18/17 2000 01/18/17 2200 01/18/17 2233   BP:       Pulse:   88 98   Temp: 36.5 °C (97.7 °F) 36.5 °C (97.7 °F) 36.6 °C (97.9 °F)    Resp:   20 22   Height:       Weight:       SpO2:   96% 94%    Oxygen Therapy:  Pulse Oximetry: 94 %, O2 Delivery: None (Room Air)    Constitutional: well developed, well nourished, non-toxic, no acute distress  HENMT: Normocephalic, atraumatic, b/l ears normal, nose normal, Mucous membranes moist  Eyes:  PERRLA, EOMI, conjunctiva normal, no discharge  Neck: no tracheal deviation, supple  Cardiovascular: normal heart rate, normal rhythm, no murmurs, no cyanosis, clubbing or edema  Lungs: Respiratory effort is normal, normal breath sounds, breath sounds clear to auscultation b/l, no rales, rhonchi or wheezing  Abdomen: soft, mild tenderness in right lower " quadrant, no guarding or rebound  Extremities:  Radial and Dorsalis Pedis pulses intact. No cyanosis or edema noted.   Skin: warm, dry, no erythema, no rash  Neurologic: Alert and oriented, strength and sensation intact, no focal deficits, CN II-XII normal  Psychiatric: No anxiety or depression         Lab Data Review:    Objective  Recent Results (from the past 24 hour(s))   URINALYSIS,CULTURE IF INDICATED    Collection Time: 01/18/17 12:40 PM   Result Value Ref Range    Color Colorless     Character Clear     Specific Gravity 1.027 <1.035    Ph 5.5 5.0-8.0    Glucose >1000 (A) Negative mg/dL    Ketones 100 (A) Negative mg/dL    Protein Negative Negative mg/dL    Bilirubin Negative Negative    Nitrite Negative Negative    Leukocyte Esterase Negative Negative    Occult Blood Negative Negative    Micro Urine Req see below     Culture Indicated No UA Culture   CBC WITH DIFFERENTIAL    Collection Time: 01/18/17  1:40 PM   Result Value Ref Range    WBC 4.9 4.8 - 10.8 K/uL    RBC 5.32 4.20 - 5.40 M/uL    Hemoglobin 14.8 12.0 - 16.0 g/dL    Hematocrit 45.0 37.0 - 47.0 %    MCV 84.6 81.4 - 97.8 fL    MCH 27.8 27.0 - 33.0 pg    MCHC 32.9 (L) 33.6 - 35.0 g/dL    RDW 44.0 35.9 - 50.0 fL    Platelet Count 241 164 - 446 K/uL    MPV 10.2 9.0 - 12.9 fL    Neutrophils-Polys 54.10 44.00 - 72.00 %    Lymphocytes 30.90 22.00 - 41.00 %    Monocytes 10.30 0.00 - 13.40 %    Eosinophils 3.70 0.00 - 6.90 %    Basophils 0.40 0.00 - 1.80 %    Immature Granulocytes 0.60 0.00 - 0.90 %    Nucleated RBC 0.00 /100 WBC    Neutrophils (Absolute) 2.62 2.00 - 7.15 K/uL    Lymphs (Absolute) 1.50 1.00 - 4.80 K/uL    Monos (Absolute) 0.50 0.00 - 0.85 K/uL    Eos (Absolute) 0.18 0.00 - 0.51 K/uL    Baso (Absolute) 0.02 0.00 - 0.12 K/uL    Immature Granulocytes (abs) 0.03 0.00 - 0.11 K/uL    NRBC (Absolute) 0.00 K/uL   COMP METABOLIC PANEL    Collection Time: 01/18/17  1:40 PM   Result Value Ref Range    Sodium 133 (L) 135 - 145 mmol/L    Potassium 4.3  3.6 - 5.5 mmol/L    Chloride 105 96 - 112 mmol/L    Co2 13 (L) 20 - 33 mmol/L    Anion Gap 15.0 (H) 0.0 - 11.9    Glucose 378 (H) 65 - 99 mg/dL    Bun 18 8 - 22 mg/dL    Creatinine 0.67 0.50 - 1.40 mg/dL    Calcium 8.4 (L) 8.5 - 10.5 mg/dL    AST(SGOT) 15 12 - 45 U/L    ALT(SGPT) 17 2 - 50 U/L    Alkaline Phosphatase 99 30 - 99 U/L    Total Bilirubin 0.6 0.1 - 1.5 mg/dL    Albumin 3.6 3.2 - 4.9 g/dL    Total Protein 6.6 6.0 - 8.2 g/dL    Globulin 3.0 1.9 - 3.5 g/dL    A-G Ratio 1.2 g/dL   VENOUS BLOOD GAS    Collection Time: 01/18/17  1:40 PM   Result Value Ref Range    Venous Bg Ph 7.29 (L) 7.31 - 7.45    Venous Bg Pco2 29.1 (L) 41.0 - 51.0 mmHg    Venous Bg Po2 37.9 25.0 - 40.0 mmHg    Venous Bg O2 Saturation 74.1 %    Venous Bg Hco3 14 (L) 24 - 28 mmol/L    Venous Bg Base Excess -11 mmol/L    Body Temp see below Centigrade   BETA-HYDROXYBUTYRIC ACID    Collection Time: 01/18/17  1:40 PM   Result Value Ref Range    beta-Hydroxybutyric Acid 5.49 (H) 0.02 - 0.27 mmol/L   LIPASE    Collection Time: 01/18/17  1:40 PM   Result Value Ref Range    Lipase 7 (L) 11 - 82 U/L   HCG QUAL SERUM    Collection Time: 01/18/17  1:40 PM   Result Value Ref Range    Beta-Hcg Qualitative Serum Negative Negative   ESTIMATED GFR    Collection Time: 01/18/17  1:40 PM   Result Value Ref Range    GFR If African American >60 >60 mL/min/1.73 m 2    GFR If Non African American >60 >60 mL/min/1.73 m 2   MAGNESIUM    Collection Time: 01/18/17  1:40 PM   Result Value Ref Range    Magnesium 2.0 1.5 - 2.5 mg/dL   PHOSPHORUS    Collection Time: 01/18/17  1:40 PM   Result Value Ref Range    Phosphorus 2.6 2.5 - 4.5 mg/dL   ACCU-CHEK GLUCOSE    Collection Time: 01/18/17  5:32 PM   Result Value Ref Range    Glucose - Accu-Ck 243 (H) 65 - 99 mg/dL   ACCU-CHEK GLUCOSE    Collection Time: 01/18/17  7:15 PM   Result Value Ref Range    Glucose - Accu-Ck 206 (H) 65 - 99 mg/dL   BASIC METABOLIC PANEL    Collection Time: 01/18/17  9:20 PM   Result Value Ref  Range    Sodium 140 135 - 145 mmol/L    Potassium 3.2 (L) 3.6 - 5.5 mmol/L    Chloride 115 (H) 96 - 112 mmol/L    Co2 18 (L) 20 - 33 mmol/L    Glucose 92 65 - 99 mg/dL    Bun 13 8 - 22 mg/dL    Creatinine 0.46 (L) 0.50 - 1.40 mg/dL    Calcium 7.3 (L) 8.5 - 10.5 mg/dL    Anion Gap 7.0 0.0 - 11.9   MAGNESIUM    Collection Time: 01/18/17  9:20 PM   Result Value Ref Range    Magnesium 1.7 1.5 - 2.5 mg/dL   PHOSPHORUS    Collection Time: 01/18/17  9:20 PM   Result Value Ref Range    Phosphorus 1.3 (L) 2.5 - 4.5 mg/dL   ESTIMATED GFR    Collection Time: 01/18/17  9:20 PM   Result Value Ref Range    GFR If African American >60 >60 mL/min/1.73 m 2    GFR If Non African American >60 >60 mL/min/1.73 m 2   ACCU-CHEK GLUCOSE    Collection Time: 01/18/17  9:22 PM   Result Value Ref Range    Glucose - Accu-Ck 81 65 - 99 mg/dL   ACCU-CHEK GLUCOSE    Collection Time: 01/18/17 10:10 PM   Result Value Ref Range    Glucose - Accu-Ck 40 (L) 65 - 99 mg/dL   ACCU-CHEK GLUCOSE    Collection Time: 01/19/17 12:30 AM   Result Value Ref Range    Glucose - Accu-Ck 91 65 - 99 mg/dL          Imaging/Procedures Review:    DX-CHEST-PORTABLE (1 VIEW)   Final Result         1. No acute cardiopulmonary abnormalities are identified.            Assessment and Plan:      1. Diabetic Ketoacidosis: Patient will be admitted to the ICU. She will be started on IV fluids and IV insulin per DKA protocol. Her electrolytes will be replaced per protocol. This was likely triggered from viral gastroenteritis. There is no evidence of any other infectious source at this time. Will transition to home regimen of Insulin once her AG has closed. Will continue to monitor glucose q hour and BMP q 4 hours. Will check HbA1C.  2. Gastroenteritis: Will treat with IV fluids. Zofran for nausea. Replace electrolytes. Will check an ultrasound of the abdomen to look for any abdominal pathology given her RLQ tenderness.   3. Hypokalemia: Low at 3.2. Will replace per IV. Check  Magnesium. Monitor BMP  4. Hypophosphatemia: Low at 1.3. Will replace per IV and monitor.  5. Diabetic Gastroparesis: Will hold off on home Reglan due to recent diarrhea.  6. Prophylaxis: sc heparin  7. Code: Full code   8. Dispo: Pt will be admitted to the inpatient service for management that is expected to take greater than 2 midnights

## 2017-01-19 NOTE — PROGRESS NOTES
Received call from Nilesh in Lab  - pt's BMP blood sample drawn in ED is contaminated with normal saline and will need to be redrawn.

## 2017-01-19 NOTE — PROGRESS NOTES
Hospital Medicine Progress Note, Adult, Complex               Author: Michael Rao Date & Time created: 2017  8:32 AM     Interval History:  ***    Review of Systems:  ROS    Physical Exam:  Physical Exam    Labs:        Invalid input(s): [id number]      Recent Labs      17   1340  170  17   0630   SODIUM  133*  140  140   POTASSIUM  4.3  3.2*  4.2   CHLORIDE  105  115*  115*   CO2  13*  18*  17*   BUN  18  13  11   CREATININE  0.67  0.46*  0.43*   MAGNESIUM  2.0  1.7   --    PHOSPHORUS  2.6  1.3*   --    CALCIUM  8.4*  7.3*  7.4*     Recent Labs      17   1340  17   0630   ALTSGPT  17   --    --    ASTSGOT  15   --    --    ALKPHOSPHAT  99   --    --    TBILIRUBIN  0.6   --    --    LIPASE  7*   --    --    GLUCOSE  378*  92  50*     Recent Labs      17   1340   RBC  5.32   HEMOGLOBIN  14.8   HEMATOCRIT  45.0   PLATELETCT  241     Recent Labs      17   1340   WBC  4.9   NEUTSPOLYS  54.10   LYMPHOCYTES  30.90   MONOCYTES  10.30   EOSINOPHILS  3.70   BASOPHILS  0.40   ASTSGOT  15   ALTSGPT  17   ALKPHOSPHAT  99   TBILIRUBIN  0.6           Hemodynamics:  Temp (24hrs), Av.6 °C (97.9 °F), Min:36.3 °C (97.3 °F), Max:37.2 °C (98.9 °F)  Temperature: 36.6 °C (97.9 °F)  Pulse  Av.1  Min: 74  Max: 121Heart Rate (Monitored): (!) 116  Blood Pressure: 108/72 mmHg, NIBP: 107/77 mmHg     Respiratory:    Respiration: 14, Pulse Oximetry: 97 %           Fluids:    Intake/Output Summary (Last 24 hours) at 17 0832  Last data filed at 17 0810   Gross per 24 hour   Intake 4103.6 ml   Output      0 ml   Net 4103.6 ml     Weight: 77 kg (169 lb 12.1 oz)  GI/Nutrition:  Orders Placed This Encounter   Procedures   • DIET ORDER     Standing Status: Standing      Number of Occurrences: 1      Standing Expiration Date:      Order Specific Question:  Diet:     Answer:  Diabetic [3]     Medical Decision Making, by Problem:  Active Hospital Problems     Diagnosis   • DKA, type 1 (CMS-HCC) [E10.10]   • DKA (diabetic ketoacidoses) (CMS-HCC) [E13.10]   • Gastroparesis due to DM (CMS-HCC) [E11.43, K31.84]   • Nausea and vomiting [R11.2]   • Hyponatremia [E87.1]   • Hypokalemia [E87.6]       Core Measures

## 2017-01-20 NOTE — DISCHARGE INSTRUCTIONS
Discharge Instructions    Discharged to home by car with relative. Discharged via walking, hospital escort: Refused.  Special equipment needed: Not Applicable    Be sure to schedule a follow-up appointment with your primary care doctor or any specialists as instructed.     Discharge Plan:   Diet Plan: Discussed  Activity Level: Discussed  Confirmed Follow up Appointment: Patient to Call and Schedule Appointment  Medication Reconciliation Updated: Yes  Influenza Vaccine Indication: Not indicated: Previously immunized this influenza season and > 8 years of age    I understand that a diet low in cholesterol, fat, and sodium is recommended for good health. Unless I have been given specific instructions below for another diet, I accept this instruction as my diet prescription.   Other diet: Diabetic    Special Instructions: None    · Is patient discharged on Warfarin / Coumadin?   No     · Is patient Post Blood Transfusion?  No    Depression / Suicide Risk    As you are discharged from this [facility] facility, it is important to learn how to keep safe from harming yourself.    Recognize the warning signs:  · Abrupt changes in personality, positive or negative- including increase in energy   · Giving away possessions  · Change in eating patterns- significant weight changes-  positive or negative  · Change in sleeping patterns- unable to sleep or sleeping all the time   · Unwillingness or inability to communicate  · Depression  · Unusual sadness, discouragement and loneliness  · Talk of wanting to die  · Neglect of personal appearance   · Rebelliousness- reckless behavior  · Withdrawal from people/activities they love  · Confusion- inability to concentrate     If you or a loved one observes any of these behaviors or has concerns about self-harm, here's what you can do:  · Talk about it- your feelings and reasons for harming yourself  · Remove any means that you might use to hurt yourself (examples: pills, rope,  extension cords, firearm)  · Get professional help from the community (Mental Health, Substance Abuse, psychological counseling)  · Do not be alone:Call your Safe Contact- someone whom you trust who will be there for you.  · Call your local CRISIS HOTLINE 105-9587 or 709-856-0998  · Call your local Children's Mobile Crisis Response Team [place] (542) 933-8510 or www.[employer]  · Call the toll free National Suicide Prevention Hotlines   · National Suicide Prevention Lifeline 829-149-MYXN (3709)  · National Hope Line Network 800-SUICIDE (526-3774)

## 2017-01-21 NOTE — DISCHARGE SUMMARY
ADMITTING PHYSICIAN:  Michael Guerra MD    PRIMARY CARE PROVIDER:  [facility].  She sees an APRN there.    DISCHARGE DIAGNOSES:  1.  Status post diabetic ketoacidosis.  2.  History of diabetic gastroparesis.  3.  Mild obesity.  4.  History of diabetic gastroparesis.  5.  Resolve of hypokalemia and hyponatremia.    IMAGING AND PROCEDURES:  Chest x-ray on 01/18/2017.  Impression, no acute   cardiopulmonary abnormalities.  Abdominal ultrasound on January 19th showed   unremarkable abdominal ultrasound.    COURSE OF HOSPITALIZATION:  Please refer to the history and physical for   further details.  In brief, this is a pleasant 26-year-old female with the   history of diabetes mellitus, type 1 and prior history of gastroparesis.  She   was having some issues at home with ongoing nausea and vomiting for 3-day   prior to admission.  She came in and was found to be in diabetic ketoacidosis   and was treated in the intensive care unit under insulin drip, IV fluids, and   electrolyte correction.  She had prompt resolve of her acidosis and anion gap.    She was able to take orals.  She was transitioned back to her regimen.  She   had improvement of her abdominal pain with no acute findings on ultrasound or   labs.  Patient felt stable for being discharged home as she was able to hold   down fluids and no further nausea.  The patient was given a script for   antinausea medications.  She is to follow up with her primary at the [facility].    LABORATORY DATA:  Of interest, CBC is grossly unremarkable.  On admission, her   initial comprehensive metabolic panel showed anion gap of 15 with glucose of   378.  Lipase was normal.  She had a beta hydroxybutyrate of 5.49.  Initial CO2   is 13.  On January 19th anion gap was 8 and her CO2 of 17.  Blood sugars had   dropped during course of hospitalization, but did improve with diet and   supplemental glucose.  Urinalysis was unremarkable.  Beta hCG was negative.    DISCHARGE CONDITION:   Stable.    DISCHARGE DIET:  Recommend a healthy diabetic diet.    DISCHARGE MEDICATIONS:  1.  Zofran ODT 4 mg 1 tablet every 4 hours as needed for nausea and vomiting.  2.  Reglan 10 mg every 8 hours with meals.  3.  Vitamin D 1000 units every evening.  4.  Ibuprofen 3 tablets.  5.  Children's Advil as needed for pain.  6.  Lipitor 20 mg every evening.  7.  Lantus 25 units twice a day.  8.  NovoLog ____ units 3 times a day before meals and carbohydrate scale of 1   unit for every 6 g of carbs.       ____________________________________     DO KATY OLIVEIRA / UDAY    DD:  01/20/2017 08:23:16  DT:  01/20/2017 19:03:11    D#:  [id number]  Job#:  [id number]

## 2017-05-04 ENCOUNTER — APPOINTMENT (OUTPATIENT)
Dept: RADIOLOGY | Facility: MEDICAL CENTER | Age: 27
DRG: 639 | End: 2017-05-04
Attending: HOSPITALIST

## 2017-05-04 ENCOUNTER — APPOINTMENT (OUTPATIENT)
Dept: RADIOLOGY | Facility: MEDICAL CENTER | Age: 27
DRG: 639 | End: 2017-05-04
Attending: EMERGENCY MEDICINE

## 2017-05-04 ENCOUNTER — RESOLUTE PROFESSIONAL BILLING HOSPITAL PROF FEE (OUTPATIENT)
Dept: HOSPITALIST | Facility: MEDICAL CENTER | Age: 27
End: 2017-05-04

## 2017-05-04 ENCOUNTER — HOSPITAL ENCOUNTER (INPATIENT)
Facility: MEDICAL CENTER | Age: 27
LOS: 4 days | DRG: 639 | End: 2017-05-08
Attending: EMERGENCY MEDICINE | Admitting: HOSPITALIST

## 2017-05-04 DIAGNOSIS — E10.10 DIABETIC KETOACIDOSIS WITHOUT COMA ASSOCIATED WITH TYPE 1 DIABETES MELLITUS (HCC): ICD-10-CM

## 2017-05-04 DIAGNOSIS — R10.30 LOWER ABDOMINAL PAIN: ICD-10-CM

## 2017-05-04 PROBLEM — E11.10 DKA (DIABETIC KETOACIDOSES): Status: ACTIVE | Noted: 2017-05-04

## 2017-05-04 LAB
ALBUMIN SERPL BCP-MCNC: 3.9 G/DL (ref 3.2–4.9)
ALBUMIN/GLOB SERPL: 1.6 G/DL
ALP SERPL-CCNC: 106 U/L (ref 30–99)
ALT SERPL-CCNC: 22 U/L (ref 2–50)
ANION GAP SERPL CALC-SCNC: 15 MMOL/L (ref 0–11.9)
APPEARANCE UR: CLEAR
AST SERPL-CCNC: 17 U/L (ref 12–45)
BASOPHILS # BLD AUTO: 0.5 % (ref 0–1.8)
BASOPHILS # BLD: 0.04 K/UL (ref 0–0.12)
BILIRUB SERPL-MCNC: 1.6 MG/DL (ref 0.1–1.5)
BUN SERPL-MCNC: 11 MG/DL (ref 8–22)
CALCIUM SERPL-MCNC: 8.6 MG/DL (ref 8.4–10.2)
CHLORIDE SERPL-SCNC: 112 MMOL/L (ref 96–112)
CO2 SERPL-SCNC: 9 MMOL/L (ref 20–33)
COLOR UR: YELLOW
CREAT SERPL-MCNC: 0.94 MG/DL (ref 0.5–1.4)
EOSINOPHIL # BLD AUTO: 0.13 K/UL (ref 0–0.51)
EOSINOPHIL NFR BLD: 1.7 % (ref 0–6.9)
ERYTHROCYTE [DISTWIDTH] IN BLOOD BY AUTOMATED COUNT: 42.6 FL (ref 35.9–50)
GFR SERPL CREATININE-BSD FRML MDRD: >60 ML/MIN/1.73 M 2
GLOBULIN SER CALC-MCNC: 2.5 G/DL (ref 1.9–3.5)
GLUCOSE BLD-MCNC: 271 MG/DL (ref 65–99)
GLUCOSE BLD-MCNC: 323 MG/DL (ref 65–99)
GLUCOSE SERPL-MCNC: 404 MG/DL (ref 65–99)
GLUCOSE UR STRIP.AUTO-MCNC: 500 MG/DL
HCG UR QL: NEGATIVE
HCT VFR BLD AUTO: 40.8 % (ref 37–47)
HGB BLD-MCNC: 14.1 G/DL (ref 12–16)
IMM GRANULOCYTES # BLD AUTO: 0.03 K/UL (ref 0–0.11)
IMM GRANULOCYTES NFR BLD AUTO: 0.4 % (ref 0–0.9)
KETONES UR STRIP.AUTO-MCNC: >=160 MG/DL
LEUKOCYTE ESTERASE UR QL STRIP.AUTO: NEGATIVE
LIPASE SERPL-CCNC: 24 U/L (ref 7–58)
LYMPHOCYTES # BLD AUTO: 1.95 K/UL (ref 1–4.8)
LYMPHOCYTES NFR BLD: 25.7 % (ref 22–41)
MCH RBC QN AUTO: 28.8 PG (ref 27–33)
MCHC RBC AUTO-ENTMCNC: 34.6 G/DL (ref 33.6–35)
MCV RBC AUTO: 83.3 FL (ref 81.4–97.8)
MONOCYTES # BLD AUTO: 0.63 K/UL (ref 0–0.85)
MONOCYTES NFR BLD AUTO: 8.3 % (ref 0–13.4)
NEUTROPHILS # BLD AUTO: 4.8 K/UL (ref 2–7.15)
NEUTROPHILS NFR BLD: 63.4 % (ref 44–72)
NITRITE UR QL STRIP.AUTO: NEGATIVE
NRBC # BLD AUTO: 0 K/UL
NRBC BLD AUTO-RTO: 0 /100 WBC
PH UR STRIP.AUTO: 5.5 [PH]
PLATELET # BLD AUTO: 206 K/UL (ref 164–446)
PMV BLD AUTO: 10.3 FL (ref 9–12.9)
POTASSIUM SERPL-SCNC: 3.4 MMOL/L (ref 3.6–5.5)
PROT SERPL-MCNC: 6.4 G/DL (ref 6–8.2)
PROT UR QL STRIP: 30 MG/DL
RBC # BLD AUTO: 4.9 M/UL (ref 4.2–5.4)
RBC UR QL AUTO: ABNORMAL
SODIUM SERPL-SCNC: 136 MMOL/L (ref 135–145)
SP GR UR STRIP.AUTO: 1.02
WBC # BLD AUTO: 7.6 K/UL (ref 4.8–10.8)

## 2017-05-04 PROCEDURE — 83690 ASSAY OF LIPASE: CPT

## 2017-05-04 PROCEDURE — 96361 HYDRATE IV INFUSION ADD-ON: CPT

## 2017-05-04 PROCEDURE — 36415 COLL VENOUS BLD VENIPUNCTURE: CPT

## 2017-05-04 PROCEDURE — 700117 HCHG RX CONTRAST REV CODE 255: Performed by: EMERGENCY MEDICINE

## 2017-05-04 PROCEDURE — 82962 GLUCOSE BLOOD TEST: CPT

## 2017-05-04 PROCEDURE — 700105 HCHG RX REV CODE 258: Performed by: HOSPITALIST

## 2017-05-04 PROCEDURE — 81002 URINALYSIS NONAUTO W/O SCOPE: CPT

## 2017-05-04 PROCEDURE — 74177 CT ABD & PELVIS W/CONTRAST: CPT

## 2017-05-04 PROCEDURE — 02HV33Z INSERTION OF INFUSION DEVICE INTO SUPERIOR VENA CAVA, PERCUTANEOUS APPROACH: ICD-10-PCS | Performed by: HOSPITALIST

## 2017-05-04 PROCEDURE — 99291 CRITICAL CARE FIRST HOUR: CPT

## 2017-05-04 PROCEDURE — 85025 COMPLETE CBC W/AUTO DIFF WBC: CPT

## 2017-05-04 PROCEDURE — 96374 THER/PROPH/DIAG INJ IV PUSH: CPT

## 2017-05-04 PROCEDURE — C1751 CATH, INF, PER/CENT/MIDLINE: HCPCS

## 2017-05-04 PROCEDURE — 96375 TX/PRO/DX INJ NEW DRUG ADDON: CPT

## 2017-05-04 PROCEDURE — 700111 HCHG RX REV CODE 636 W/ 250 OVERRIDE (IP): Performed by: EMERGENCY MEDICINE

## 2017-05-04 PROCEDURE — 770022 HCHG ROOM/CARE - ICU (200)

## 2017-05-04 PROCEDURE — 36556 INSERT NON-TUNNEL CV CATH: CPT | Performed by: HOSPITALIST

## 2017-05-04 PROCEDURE — 99291 CRITICAL CARE FIRST HOUR: CPT | Mod: 25 | Performed by: HOSPITALIST

## 2017-05-04 PROCEDURE — 81025 URINE PREGNANCY TEST: CPT

## 2017-05-04 PROCEDURE — 36556 INSERT NON-TUNNEL CV CATH: CPT

## 2017-05-04 PROCEDURE — 80053 COMPREHEN METABOLIC PANEL: CPT

## 2017-05-04 PROCEDURE — 71010 DX-CHEST-PORTABLE (1 VIEW): CPT

## 2017-05-04 RX ORDER — SODIUM CHLORIDE 9 MG/ML
2000 INJECTION, SOLUTION INTRAVENOUS ONCE
Status: COMPLETED | OUTPATIENT
Start: 2017-05-04 | End: 2017-05-04

## 2017-05-04 RX ORDER — SODIUM CHLORIDE 9 MG/ML
INJECTION, SOLUTION INTRAVENOUS CONTINUOUS
Status: DISCONTINUED | OUTPATIENT
Start: 2017-05-04 | End: 2017-05-05

## 2017-05-04 RX ORDER — ACETAMINOPHEN 325 MG/1
650 TABLET ORAL EVERY 6 HOURS PRN
Status: DISCONTINUED | OUTPATIENT
Start: 2017-05-04 | End: 2017-05-08 | Stop reason: HOSPADM

## 2017-05-04 RX ORDER — BISACODYL 10 MG
10 SUPPOSITORY, RECTAL RECTAL
Status: DISCONTINUED | OUTPATIENT
Start: 2017-05-04 | End: 2017-05-08 | Stop reason: HOSPADM

## 2017-05-04 RX ORDER — DEXTROSE AND SODIUM CHLORIDE 5; .45 G/100ML; G/100ML
INJECTION, SOLUTION INTRAVENOUS CONTINUOUS
Status: DISCONTINUED | OUTPATIENT
Start: 2017-05-04 | End: 2017-05-06

## 2017-05-04 RX ORDER — PROMETHAZINE HYDROCHLORIDE 25 MG/1
12.5-25 SUPPOSITORY RECTAL EVERY 4 HOURS PRN
Status: DISCONTINUED | OUTPATIENT
Start: 2017-05-04 | End: 2017-05-08 | Stop reason: HOSPADM

## 2017-05-04 RX ORDER — ATORVASTATIN CALCIUM 10 MG/1
20 TABLET, FILM COATED ORAL NIGHTLY
Status: DISCONTINUED | OUTPATIENT
Start: 2017-05-04 | End: 2017-05-08 | Stop reason: HOSPADM

## 2017-05-04 RX ORDER — METOCLOPRAMIDE HYDROCHLORIDE 5 MG/5ML
10 SOLUTION ORAL
Status: DISCONTINUED | OUTPATIENT
Start: 2017-05-05 | End: 2017-05-08 | Stop reason: HOSPADM

## 2017-05-04 RX ORDER — MAGNESIUM SULFATE HEPTAHYDRATE 40 MG/ML
4 INJECTION, SOLUTION INTRAVENOUS
Status: COMPLETED | OUTPATIENT
Start: 2017-05-04 | End: 2017-05-05

## 2017-05-04 RX ORDER — DEXTROSE AND SODIUM CHLORIDE 10; .45 G/100ML; G/100ML
INJECTION, SOLUTION INTRAVENOUS CONTINUOUS
Status: DISCONTINUED | OUTPATIENT
Start: 2017-05-04 | End: 2017-05-06

## 2017-05-04 RX ORDER — DEXTROSE MONOHYDRATE 25 G/50ML
25 INJECTION, SOLUTION INTRAVENOUS
Status: DISCONTINUED | OUTPATIENT
Start: 2017-05-04 | End: 2017-05-08 | Stop reason: HOSPADM

## 2017-05-04 RX ORDER — AMOXICILLIN 250 MG
2 CAPSULE ORAL 2 TIMES DAILY
Status: DISCONTINUED | OUTPATIENT
Start: 2017-05-05 | End: 2017-05-08 | Stop reason: HOSPADM

## 2017-05-04 RX ORDER — FERROUS SULFATE 325(65) MG
325 TABLET ORAL DAILY
Status: ON HOLD | COMMUNITY
End: 2017-08-13

## 2017-05-04 RX ORDER — OXYCODONE HYDROCHLORIDE 5 MG/1
2.5 TABLET ORAL
Status: DISCONTINUED | OUTPATIENT
Start: 2017-05-04 | End: 2017-05-08 | Stop reason: HOSPADM

## 2017-05-04 RX ORDER — OXYCODONE HYDROCHLORIDE 5 MG/1
5 TABLET ORAL
Status: DISCONTINUED | OUTPATIENT
Start: 2017-05-04 | End: 2017-05-08 | Stop reason: HOSPADM

## 2017-05-04 RX ORDER — ONDANSETRON 4 MG/1
4 TABLET, ORALLY DISINTEGRATING ORAL EVERY 4 HOURS PRN
Status: DISCONTINUED | OUTPATIENT
Start: 2017-05-04 | End: 2017-05-08 | Stop reason: HOSPADM

## 2017-05-04 RX ORDER — ONDANSETRON 2 MG/ML
4 INJECTION INTRAMUSCULAR; INTRAVENOUS ONCE
Status: COMPLETED | OUTPATIENT
Start: 2017-05-04 | End: 2017-05-04

## 2017-05-04 RX ORDER — HEPARIN SODIUM 5000 [USP'U]/ML
5000 INJECTION, SOLUTION INTRAVENOUS; SUBCUTANEOUS EVERY 8 HOURS
Status: DISCONTINUED | OUTPATIENT
Start: 2017-05-05 | End: 2017-05-08 | Stop reason: HOSPADM

## 2017-05-04 RX ORDER — MORPHINE SULFATE 4 MG/ML
2 INJECTION, SOLUTION INTRAMUSCULAR; INTRAVENOUS
Status: DISCONTINUED | OUTPATIENT
Start: 2017-05-04 | End: 2017-05-08 | Stop reason: HOSPADM

## 2017-05-04 RX ORDER — MAGNESIUM SULFATE HEPTAHYDRATE 40 MG/ML
2 INJECTION, SOLUTION INTRAVENOUS
Status: COMPLETED | OUTPATIENT
Start: 2017-05-04 | End: 2017-05-05

## 2017-05-04 RX ORDER — ONDANSETRON 2 MG/ML
4 INJECTION INTRAMUSCULAR; INTRAVENOUS EVERY 4 HOURS PRN
Status: DISCONTINUED | OUTPATIENT
Start: 2017-05-04 | End: 2017-05-08 | Stop reason: HOSPADM

## 2017-05-04 RX ORDER — POLYETHYLENE GLYCOL 3350 17 G/17G
1 POWDER, FOR SOLUTION ORAL
Status: DISCONTINUED | OUTPATIENT
Start: 2017-05-04 | End: 2017-05-08 | Stop reason: HOSPADM

## 2017-05-04 RX ORDER — PROMETHAZINE HYDROCHLORIDE 25 MG/1
12.5-25 TABLET ORAL EVERY 4 HOURS PRN
Status: DISCONTINUED | OUTPATIENT
Start: 2017-05-04 | End: 2017-05-08 | Stop reason: HOSPADM

## 2017-05-04 RX ADMIN — IOHEXOL 100 ML: 350 INJECTION, SOLUTION INTRAVENOUS at 22:56

## 2017-05-04 RX ADMIN — ONDANSETRON 4 MG: 2 INJECTION INTRAMUSCULAR; INTRAVENOUS at 20:07

## 2017-05-04 RX ADMIN — SODIUM CHLORIDE 2000 ML: 9 INJECTION, SOLUTION INTRAVENOUS at 22:25

## 2017-05-04 RX ADMIN — HYDROMORPHONE HYDROCHLORIDE 0.5 MG: 1 INJECTION, SOLUTION INTRAMUSCULAR; INTRAVENOUS; SUBCUTANEOUS at 20:07

## 2017-05-04 ASSESSMENT — PAIN SCALES - GENERAL: PAINLEVEL_OUTOF10: 5

## 2017-05-04 ASSESSMENT — LIFESTYLE VARIABLES
EVER_SMOKED: NEVER
ALCOHOL_USE: NO

## 2017-05-04 NOTE — IP AVS SNAPSHOT
GoSquared Access Code: [email or web address]  Expires: 6/7/2017 10:46 AM    GoSquared  A secure, online tool to manage your health information     [employer]’s GoSquared® is a secure, online tool that connects you to your personalized health information from the privacy of your home -- day or night - making it very easy for you to manage your healthcare. Once the activation process is completed, you can even access your medical information using the GoSquared leyda, which is available for free in the Apple Leyda store or Google Play store.     GoSquared provides the following levels of access (as shown below):   My Chart Features   [facility] Primary Care Doctor [facility]  Specialists [facility]  Urgent  Care Non-[facility]  Primary Care  Doctor   Email your healthcare team securely and privately 24/7 X X X X   Manage appointments: schedule your next appointment; view details of past/upcoming appointments X      Request prescription refills. X      View recent personal medical records, including lab and immunizations X X X X   View health record, including health history, allergies, medications X X X X   Read reports about your outpatient visits, procedures, consult and ER notes X X X X   See your discharge summary, which is a recap of your hospital and/or ER visit that includes your diagnosis, lab results, and care plan. X X       How to register for GoSquared:  1. Go to  [email or web address].  2. Click on the Sign Up Now box, which takes you to the New Member Sign Up page. You will need to provide the following information:  a. Enter your GoSquared Access Code exactly as it appears at the top of this page. (You will not need to use this code after you’ve completed the sign-up process. If you do not sign up before the expiration date, you must request a new code.)   b. Enter your date of birth.   c. Enter your home email address.   d. Click Submit, and follow the next screen’s instructions.  3. Create a GoSquared ID. This will be your GoSquared  login ID and cannot be changed, so think of one that is secure and easy to remember.  4. Create a [employer] password. You can change your password at any time.  5. Enter your Password Reset Question and Answer. This can be used at a later time if you forget your password.   6. Enter your e-mail address. This allows you to receive e-mail notifications when new information is available in [employer].  7. Click Sign Up. You can now view your health information.    For assistance activating your [employer] account, call [phone]

## 2017-05-04 NOTE — IP AVS SNAPSHOT
5/8/2017    Alis Sparks  [address]  [place] [address]    Dear Alis:    [facility] wants to ensure your discharge home is safe and you or your loved ones have had all of your questions answered regarding your care after you leave the hospital.    Below is a list of resources and contact information should you have any questions regarding your hospital stay, follow-up instructions, or active medical symptoms.    Questions or Concerns Regarding… Contact   Medical Questions Related to Your Discharge  (7 days a week, 8am-5pm) Contact a Nurse Care Coordinator   [phone]   Medical Questions Not Related to Your Discharge  (24 hours a day / 7 days a week)  Contact the Nurse Health Line   [phone]    Medications or Discharge Instructions Refer to your discharge packet   or contact your [facility] Primary Care Provider   [phone]   Follow-up Appointment(s) Schedule your appointment via [employer]   or contact Scheduling [phone]   Billing Review your statement via [employer]  or contact Billing [phone]   Medical Records Review your records via [employer]   or contact Medical Records [phone]     You may receive a telephone call within two days of discharge. This call is to make certain you understand your discharge instructions and have the opportunity to have any questions answered. You can also easily access your medical information, test results and upcoming appointments via the [employer] free online health management tool. You can learn more and sign up at [employer]. For assistance setting up your [employer] account, please call [phone].    Once again, we want to ensure your discharge home is safe and that you have a clear understanding of any next steps in your care. If you have any questions or concerns, please do not hesitate to contact us, we are here for you. Thank you for choosing [facility] for your healthcare needs.    Sincerely,    Your [facility] Healthcare Team

## 2017-05-04 NOTE — IP AVS SNAPSHOT
" Home Care Instructions                                                                                                                  Name:Alis Sparks  Medical Record Number:[id number]  CSN: [id number]    YOB: 1990   Age: 26 y.o.  Sex: female  HT:1.651 m (5' 5\") WT: 77.1 kg (169 lb 15.6 oz)          Admit Date: 5/4/2017     Discharge Date:   Today's Date: 5/8/2017  Attending Doctor:  Pa Urbina M.D.                  Allergies:  Pcn; Promethazine; and Lisinopril            Discharge Instructions       Discharge Instructions    Discharged to home by car with relative. Discharged via wheelchair, hospital escort: Yes.  Special equipment needed: Not Applicable    Be sure to schedule a follow-up appointment with your primary care doctor or any specialists as instructed.     Discharge Plan:   Influenza Vaccine Indication: Not indicated: Previously immunized this influenza season and > 8 years of age    I understand that a diet low in cholesterol, fat, and sodium is recommended for good health. Unless I have been given specific instructions below for another diet, I accept this instruction as my diet prescription.   Other diet: Diabetic    Special Instructions: None    · Is patient discharged on Warfarin / Coumadin?   No     · Is patient Post Blood Transfusion?  No    Depression / Suicide Risk    As you are discharged from this [facility] facility, it is important to learn how to keep safe from harming yourself.    Recognize the warning signs:  · Abrupt changes in personality, positive or negative- including increase in energy   · Giving away possessions  · Change in eating patterns- significant weight changes-  positive or negative  · Change in sleeping patterns- unable to sleep or sleeping all the time   · Unwillingness or inability to communicate  · Depression  · Unusual sadness, discouragement and loneliness  · Talk of wanting to die  · Neglect of personal appearance   · Rebelliousness- " reckless behavior  · Withdrawal from people/activities they love  · Confusion- inability to concentrate     If you or a loved one observes any of these behaviors or has concerns about self-harm, here's what you can do:  · Talk about it- your feelings and reasons for harming yourself  · Remove any means that you might use to hurt yourself (examples: pills, rope, extension cords, firearm)  · Get professional help from the community (Mental Health, Substance Abuse, psychological counseling)  · Do not be alone:Call your Safe Contact- someone whom you trust who will be there for you.  · Call your local CRISIS HOTLINE 868-4951 or 085-558-0297  · Call your local Children's Mobile Crisis Response Team [place] (224) 147-3799 or www.[employer]  · Call the toll free National Suicide Prevention Hotlines   · National Suicide Prevention Lifeline 264-316-OBAE (8050)  · [employer] Line Network 800-SUICIDE (653-5931)      Diabetic Ketoacidosis  Diabetic ketoacidosis is a life-threatening complication of diabetes. If it is not treated, it can cause severe dehydration and organ damage and can lead to a coma or death.  CAUSES  This condition develops when there is not enough of the hormone insulin in the body. Insulin helps the body to break down sugar for energy. Without insulin, the body cannot break down sugar, so it breaks down fats instead. This leads to the production of acids that are called ketones. Ketones are poisonous at high levels.  This condition can be triggered by:  · Stress on the body that is brought on by an illness.  · Medicines that raise blood glucose levels.  · Not taking diabetes medicine.  SYMPTOMS  Symptoms of this condition include:  · Fatigue.  · Weight loss.  · Excessive thirst.  · Light-headedness.  · Fruity or sweet-smelling breath.  · Excessive urination.  · Vision changes.  · Confusion or irritability.  · Nausea.  · Vomiting.  · Rapid breathing.  · Abdominal pain.  · Feeling  flushed.  DIAGNOSIS  This condition is diagnosed based on a medical history, a physical exam, and blood tests. You may also have a urine test that checks for ketones.  TREATMENT  This condition may be treated with:  · Fluid replacement. This may be done to correct dehydration.  · Insulin injections. These may be given through the skin or through an IV tube.  · Electrolyte replacement. Electrolytes, such as potassium and sodium, may be given in pill form or through an IV tube.  · Antibiotic medicines. These may be prescribed if your condition was caused by an infection.  HOME CARE INSTRUCTIONS  Eating and Drinking  · Drink enough fluids to keep your urine clear or pale yellow.  · If you cannot eat, alternate between drinking fluids with sugar (such as juice) and salty fluids (such as broth or bouillon).  · If you can eat, follow your usual diet and drink sugar-free liquids, such as water.  Other Instructions  · Take insulin as directed by your health care provider. Do not skip insulin injections. Do not use  insulin.  · If your blood sugar is over 240 mg/dL, monitor your urine ketones every 4-6 hours.  · If you were prescribed an antibiotic medicine, finish all of it even if you start to feel better.  · Rest and exercise only as directed by your health care provider.  · If you get sick, call your health care provider and begin treatment quickly. Your body often needs extra insulin to fight an illness.  · Check your blood glucose levels regularly. If your blood glucose is high, drink plenty of fluids. This helps to flush out ketones.  SEEK MEDICAL CARE IF:  · Your blood glucose level is too high or too low.  · You have ketones in your urine.  · You have a fever.  · You cannot eat.  · You cannot tolerate fluids.  · You have been vomiting for more than 2 hours.  · You continue to have symptoms of this condition.  · You develop new symptoms.  SEEK IMMEDIATE MEDICAL CARE IF:  · Your blood glucose levels continue to  "be high (elevated).  · Your monitor reads \"high\" even when you are taking insulin.  · You faint.  · You have chest pain.  · You have trouble breathing.  · You have a sudden, severe headache.  · You have sudden weakness in one arm or one leg.  · You have sudden trouble speaking or swallowing.  · You have vomiting or diarrhea that gets worse after 3 hours.  · You feel severely fatigued.  · You have trouble thinking.  · You have abdominal pain.  · You are severely dehydrated. Symptoms of severe dehydration include:  ¨ Extreme thirst.  ¨ Dry mouth.  ¨ Blue lips.  ¨ Cold hands and feet.  ¨ Rapid breathing.     This information is not intended to replace advice given to you by your health care provider. Make sure you discuss any questions you have with your health care provider.     Document Released: 12/15/2001 Document Revised: 05/03/2016 Document Reviewed: 11/25/2015  [employer] Patient Education ©2016 [employer].        Follow-up Information     1. Follow up with Taylor ALEXANDRA. Go on 5/24/2017.    Why:  Please arrive at 9:15 am for a 9:30 am appointment. Taylor is the physician assistant for Dr. Huber. Thank you.    Contact information    [address] [address]  [phone]          2. Follow up with DAVID Rios. Go on 5/10/2017.    Specialty:  Family Medicine    Why:  Please arrive at 1:45 pm for a 2:00 pm appointment. Thank you.     Contact information    [facility] [facility] [address]  [facility] [address]  [phone]           Discharge Medication Instructions:    Below are the medications your physician expects you to take upon discharge:    Review all your home medications and newly ordered medications with your doctor and/or pharmacist. Follow medication instructions as directed by your doctor and/or pharmacist.    Please keep your medication list with you and share with your physician.               Medication List      CHANGE how you take these medications  "       Instructions    Morning Afternoon Evening Bedtime    insulin glargine 100 UNIT/ML Soln   What changed:  how much to take   Last time this was given:  30 Units on 5/8/2017  6:22 AM   Commonly known as:  LANTUS        Inject 32 Units as instructed 2 times a day.   Dose:  32 Units                          CONTINUE taking these medications        Instructions    Morning Afternoon Evening Bedtime    atorvastatin 20 MG Tabs   Last time this was given:  20 mg on 5/7/2017  8:41 PM   Commonly known as:  LIPITOR        Take 20 mg by mouth every evening.   Dose:  20 mg                        ferrous sulfate 325 (65 FE) MG tablet        Take 325 mg by mouth every day.   Dose:  325 mg                        metoclopramide 5 MG/5ML Soln   Last time this was given:  10 mg on 5/8/2017  8:23 AM   Commonly known as:  REGLAN        Take 10 mg by mouth 3 times a day, with meals.   Dose:  10 mg                        NOVOLOG 100 UNIT/ML Soln   Generic drug:  insulin aspart        Inject 1-3 Units as instructed 3 times a day before meals. 1 unit for every 3 grams of carbohydrate 1 unit for every 50 mg/dL > 150 mg/dL   Dose:  1-3 Units                             Where to Get Your Medications      Information about where to get these medications is not yet available     ! Ask your nurse or doctor about these medications    - insulin glargine 100 UNIT/ML Soln            Instructions           Diet / Nutrition:    Follow any diet instructions given to you by your doctor or the dietician, including how much salt (sodium) you are allowed each day.    If you are overweight, talk to your doctor about a weight reduction plan.    Activity:    Remain physically active following your doctor's instructions about exercise and activity.    Rest often.     Any time you become even a little tired or short of breath, SIT DOWN and rest.    Worsening Symptoms:    Report any of the following signs and symptoms to the doctor's office  immediately:    *Pain of jaw, arm, or neck  *Chest pain not relieved by medication                               *Dizziness or loss of consciousness  *Difficulty breathing even when at rest   *More tired than usual                                       *Bleeding drainage or swelling of surgical site  *Swelling of feet, ankles, legs or stomach                 *Fever (>100ºF)  *Pink or blood tinged sputum  *Weight gain (3lbs/day or 5lbs /week)           *Shock from internal defibrillator (if applicable)  *Palpitations or irregular heartbeats                *Cool and/or numb extremities    Stroke Awareness    Common Risk Factors for Stroke include:    Age  Atrial Fibrillation  Carotid Artery Stenosis  Diabetes Mellitus  Excessive alcohol consumption  High blood pressure  Overweight   Physical inactivity  Smoking    Warning signs and symptoms of a stroke include:    *Sudden numbness or weakness of the face, arm or leg (especially on one side of the body).  *Sudden confusion, trouble speaking or understanding.  *Sudden trouble seeing in one or both eyes.  *Sudden trouble walking, dizziness, loss of balance or coordination.Sudden severe headache with no known cause.    It is very important to get treatment quickly when a stroke occurs. If you experience any of the above warning signs, call 911 immediately.                   Disclaimer         Quit Smoking / Tobacco Use:    I understand the use of any tobacco products increases my chance of suffering from future heart disease or stroke and could cause other illnesses which may shorten my life. Quitting the use of tobacco products is the single most important thing I can do to improve my health. For further information on smoking / tobacco cessation call a Toll Free Quit Line at 1-984.442.1678 (*National Cancer [place]) or 1-831.569.6394 (American Lung Association) or you can access the web based program at www.lungusa.org.    [place] Tobacco Users Help Line:  (881)  875-8826       Toll Free: 1-611-472-4006  Quit Tobacco Program [facility] (997)160-0540    Crisis Hotline:    [place] Crisis Hotline:  8-935-RHHIBUT or 1-300.594.2252    [place] Crisis Hotline:    1-108.513.7508 or 877-224-2303    Discharge Survey:   Thank you for choosing [facility]. We hope we did everything we could to make your hospital stay a pleasant one. You may be receiving a phone survey and we would appreciate your time and participation in answering the questions. Your input is very valuable to us in our efforts to improve our service to our patients and their families.        My signature on this form indicates that:    1. I have reviewed and understand the above information.  2. My questions regarding this information have been answered to my satisfaction.  3. I have formulated a plan with my discharge nurse to obtain my prescribed medications for home.                  Disclaimer         __________________________________                     __________       ________                       Patient Signature                                                 Date                    Time

## 2017-05-05 PROBLEM — R10.9 ABDOMINAL PAIN: Status: ACTIVE | Noted: 2017-05-05

## 2017-05-05 PROBLEM — E87.6 HYPOKALEMIA: Status: ACTIVE | Noted: 2017-05-05

## 2017-05-05 LAB
ALBUMIN SERPL BCP-MCNC: 3.4 G/DL (ref 3.2–4.9)
ALBUMIN/GLOB SERPL: 1.5 G/DL
ALP SERPL-CCNC: 96 U/L (ref 30–99)
ALT SERPL-CCNC: 19 U/L (ref 2–50)
ANION GAP SERPL CALC-SCNC: 15 MMOL/L (ref 0–11.9)
ANION GAP SERPL CALC-SCNC: 4 MMOL/L (ref 0–11.9)
ANION GAP SERPL CALC-SCNC: 5 MMOL/L (ref 0–11.9)
ANION GAP SERPL CALC-SCNC: 5 MMOL/L (ref 0–11.9)
ANION GAP SERPL CALC-SCNC: 6 MMOL/L (ref 0–11.9)
ANION GAP SERPL CALC-SCNC: 8 MMOL/L (ref 0–11.9)
APPEARANCE UR: CLEAR
AST SERPL-CCNC: 16 U/L (ref 12–45)
BACTERIA #/AREA URNS HPF: ABNORMAL /HPF
BASOPHILS # BLD AUTO: 0.6 % (ref 0–1.8)
BASOPHILS # BLD: 0.05 K/UL (ref 0–0.12)
BILIRUB SERPL-MCNC: 1.3 MG/DL (ref 0.1–1.5)
BILIRUB UR QL STRIP.AUTO: NEGATIVE
BUN SERPL-MCNC: 7 MG/DL (ref 8–22)
BUN SERPL-MCNC: 9 MG/DL (ref 8–22)
CALCIUM SERPL-MCNC: 7.2 MG/DL (ref 8.4–10.2)
CALCIUM SERPL-MCNC: 7.3 MG/DL (ref 8.4–10.2)
CALCIUM SERPL-MCNC: 7.3 MG/DL (ref 8.4–10.2)
CALCIUM SERPL-MCNC: 7.4 MG/DL (ref 8.4–10.2)
CALCIUM SERPL-MCNC: 7.4 MG/DL (ref 8.4–10.2)
CALCIUM SERPL-MCNC: 7.5 MG/DL (ref 8.4–10.2)
CHLORIDE SERPL-SCNC: 115 MMOL/L (ref 96–112)
CHLORIDE SERPL-SCNC: 116 MMOL/L (ref 96–112)
CHLORIDE SERPL-SCNC: 116 MMOL/L (ref 96–112)
CHLORIDE SERPL-SCNC: 118 MMOL/L (ref 96–112)
CHLORIDE SERPL-SCNC: 121 MMOL/L (ref 96–112)
CHLORIDE SERPL-SCNC: 123 MMOL/L (ref 96–112)
CO2 SERPL-SCNC: 12 MMOL/L (ref 20–33)
CO2 SERPL-SCNC: 13 MMOL/L (ref 20–33)
CO2 SERPL-SCNC: 13 MMOL/L (ref 20–33)
CO2 SERPL-SCNC: 14 MMOL/L (ref 20–33)
CO2 SERPL-SCNC: 15 MMOL/L (ref 20–33)
CO2 SERPL-SCNC: 7 MMOL/L (ref 20–33)
COLOR UR: YELLOW
CREAT SERPL-MCNC: 0.44 MG/DL (ref 0.5–1.4)
CREAT SERPL-MCNC: 0.44 MG/DL (ref 0.5–1.4)
CREAT SERPL-MCNC: 0.48 MG/DL (ref 0.5–1.4)
CREAT SERPL-MCNC: 0.54 MG/DL (ref 0.5–1.4)
CREAT SERPL-MCNC: 0.55 MG/DL (ref 0.5–1.4)
CREAT SERPL-MCNC: 0.74 MG/DL (ref 0.5–1.4)
EOSINOPHIL # BLD AUTO: 0.16 K/UL (ref 0–0.51)
EOSINOPHIL NFR BLD: 2 % (ref 0–6.9)
EPI CELLS #/AREA URNS HPF: ABNORMAL /HPF
ERYTHROCYTE [DISTWIDTH] IN BLOOD BY AUTOMATED COUNT: 42.3 FL (ref 35.9–50)
GFR SERPL CREATININE-BSD FRML MDRD: >60 ML/MIN/1.73 M 2
GLOBULIN SER CALC-MCNC: 2.2 G/DL (ref 1.9–3.5)
GLUCOSE BLD-MCNC: 122 MG/DL (ref 65–99)
GLUCOSE BLD-MCNC: 123 MG/DL (ref 65–99)
GLUCOSE BLD-MCNC: 131 MG/DL (ref 65–99)
GLUCOSE BLD-MCNC: 132 MG/DL (ref 65–99)
GLUCOSE BLD-MCNC: 135 MG/DL (ref 65–99)
GLUCOSE BLD-MCNC: 146 MG/DL (ref 65–99)
GLUCOSE BLD-MCNC: 147 MG/DL (ref 65–99)
GLUCOSE BLD-MCNC: 156 MG/DL (ref 65–99)
GLUCOSE BLD-MCNC: 167 MG/DL (ref 65–99)
GLUCOSE BLD-MCNC: 178 MG/DL (ref 65–99)
GLUCOSE BLD-MCNC: 190 MG/DL (ref 65–99)
GLUCOSE BLD-MCNC: 194 MG/DL (ref 65–99)
GLUCOSE BLD-MCNC: 240 MG/DL (ref 65–99)
GLUCOSE BLD-MCNC: 248 MG/DL (ref 65–99)
GLUCOSE BLD-MCNC: 252 MG/DL (ref 65–99)
GLUCOSE BLD-MCNC: 276 MG/DL (ref 65–99)
GLUCOSE BLD-MCNC: 313 MG/DL (ref 65–99)
GLUCOSE BLD-MCNC: 96 MG/DL (ref 65–99)
GLUCOSE SERPL-MCNC: 125 MG/DL (ref 65–99)
GLUCOSE SERPL-MCNC: 141 MG/DL (ref 65–99)
GLUCOSE SERPL-MCNC: 143 MG/DL (ref 65–99)
GLUCOSE SERPL-MCNC: 161 MG/DL (ref 65–99)
GLUCOSE SERPL-MCNC: 219 MG/DL (ref 65–99)
GLUCOSE SERPL-MCNC: 315 MG/DL (ref 65–99)
GLUCOSE UR STRIP.AUTO-MCNC: 500 MG/DL
HCT VFR BLD AUTO: 37.9 % (ref 37–47)
HGB BLD-MCNC: 13.2 G/DL (ref 12–16)
IMM GRANULOCYTES # BLD AUTO: 0.1 K/UL (ref 0–0.11)
IMM GRANULOCYTES NFR BLD AUTO: 1.3 % (ref 0–0.9)
KETONES UR STRIP.AUTO-MCNC: >=80 MG/DL
LEUKOCYTE ESTERASE UR QL STRIP.AUTO: NEGATIVE
LYMPHOCYTES # BLD AUTO: 2.9 K/UL (ref 1–4.8)
LYMPHOCYTES NFR BLD: 36.7 % (ref 22–41)
MAGNESIUM SERPL-MCNC: 1.7 MG/DL (ref 1.5–2.5)
MCH RBC QN AUTO: 28.7 PG (ref 27–33)
MCHC RBC AUTO-ENTMCNC: 34.8 G/DL (ref 33.6–35)
MCV RBC AUTO: 82.4 FL (ref 81.4–97.8)
MICRO URNS: ABNORMAL
MONOCYTES # BLD AUTO: 0.75 K/UL (ref 0–0.85)
MONOCYTES NFR BLD AUTO: 9.5 % (ref 0–13.4)
MUCOUS THREADS #/AREA URNS HPF: ABNORMAL /HPF
NEUTROPHILS # BLD AUTO: 3.94 K/UL (ref 2–7.15)
NEUTROPHILS NFR BLD: 49.9 % (ref 44–72)
NITRITE UR QL STRIP.AUTO: NEGATIVE
NRBC # BLD AUTO: 0 K/UL
NRBC BLD AUTO-RTO: 0 /100 WBC
PH UR STRIP.AUTO: 5.5 [PH]
PHOSPHATE SERPL-MCNC: 1.5 MG/DL (ref 2.5–4.5)
PLATELET # BLD AUTO: 180 K/UL (ref 164–446)
PMV BLD AUTO: 10.2 FL (ref 9–12.9)
POTASSIUM SERPL-SCNC: 3.1 MMOL/L (ref 3.6–5.5)
POTASSIUM SERPL-SCNC: 3.4 MMOL/L (ref 3.6–5.5)
POTASSIUM SERPL-SCNC: 3.5 MMOL/L (ref 3.6–5.5)
POTASSIUM SERPL-SCNC: 3.5 MMOL/L (ref 3.6–5.5)
POTASSIUM SERPL-SCNC: 3.7 MMOL/L (ref 3.6–5.5)
POTASSIUM SERPL-SCNC: 3.7 MMOL/L (ref 3.6–5.5)
PROT SERPL-MCNC: 5.6 G/DL (ref 6–8.2)
PROT UR QL STRIP: NEGATIVE MG/DL
RBC # BLD AUTO: 4.6 M/UL (ref 4.2–5.4)
RBC # URNS HPF: ABNORMAL /HPF
RBC UR QL AUTO: ABNORMAL
SODIUM SERPL-SCNC: 135 MMOL/L (ref 135–145)
SODIUM SERPL-SCNC: 140 MMOL/L (ref 135–145)
SODIUM SERPL-SCNC: 140 MMOL/L (ref 135–145)
SODIUM SERPL-SCNC: 141 MMOL/L (ref 135–145)
SP GR UR STRIP.AUTO: 1.02
WBC # BLD AUTO: 7.9 K/UL (ref 4.8–10.8)
WBC #/AREA URNS HPF: ABNORMAL /HPF

## 2017-05-05 PROCEDURE — 81001 URINALYSIS AUTO W/SCOPE: CPT

## 2017-05-05 PROCEDURE — 700102 HCHG RX REV CODE 250 W/ 637 OVERRIDE(OP): Performed by: HOSPITALIST

## 2017-05-05 PROCEDURE — 700102 HCHG RX REV CODE 250 W/ 637 OVERRIDE(OP)

## 2017-05-05 PROCEDURE — 80048 BASIC METABOLIC PNL TOTAL CA: CPT | Mod: 91

## 2017-05-05 PROCEDURE — 83735 ASSAY OF MAGNESIUM: CPT

## 2017-05-05 PROCEDURE — 85025 COMPLETE CBC W/AUTO DIFF WBC: CPT

## 2017-05-05 PROCEDURE — 700111 HCHG RX REV CODE 636 W/ 250 OVERRIDE (IP): Performed by: HOSPITALIST

## 2017-05-05 PROCEDURE — 80053 COMPREHEN METABOLIC PANEL: CPT

## 2017-05-05 PROCEDURE — 700102 HCHG RX REV CODE 250 W/ 637 OVERRIDE(OP): Performed by: PHARMACIST

## 2017-05-05 PROCEDURE — 700105 HCHG RX REV CODE 258: Performed by: HOSPITALIST

## 2017-05-05 PROCEDURE — A9270 NON-COVERED ITEM OR SERVICE: HCPCS | Performed by: HOSPITALIST

## 2017-05-05 PROCEDURE — 82962 GLUCOSE BLOOD TEST: CPT | Mod: 91

## 2017-05-05 PROCEDURE — 700105 HCHG RX REV CODE 258: Performed by: PHARMACIST

## 2017-05-05 PROCEDURE — 770022 HCHG ROOM/CARE - ICU (200)

## 2017-05-05 PROCEDURE — 99291 CRITICAL CARE FIRST HOUR: CPT | Performed by: HOSPITALIST

## 2017-05-05 PROCEDURE — 84100 ASSAY OF PHOSPHORUS: CPT

## 2017-05-05 PROCEDURE — 700101 HCHG RX REV CODE 250

## 2017-05-05 RX ORDER — POTASSIUM CHLORIDE 29.8 MG/ML
40 INJECTION INTRAVENOUS ONCE
Status: COMPLETED | OUTPATIENT
Start: 2017-05-05 | End: 2017-05-05

## 2017-05-05 RX ORDER — POTASSIUM CHLORIDE 14.9 MG/ML
20 INJECTION INTRAVENOUS ONCE
Status: COMPLETED | OUTPATIENT
Start: 2017-05-06 | End: 2017-05-06

## 2017-05-05 RX ORDER — POTASSIUM CHLORIDE 14.9 MG/ML
20 INJECTION INTRAVENOUS ONCE
Status: COMPLETED | OUTPATIENT
Start: 2017-05-05 | End: 2017-05-05

## 2017-05-05 RX ORDER — MAGNESIUM SULFATE HEPTAHYDRATE 40 MG/ML
2 INJECTION, SOLUTION INTRAVENOUS ONCE
Status: COMPLETED | OUTPATIENT
Start: 2017-05-05 | End: 2017-05-05

## 2017-05-05 RX ADMIN — ATORVASTATIN CALCIUM 20 MG: 40 TABLET, FILM COATED ORAL at 21:08

## 2017-05-05 RX ADMIN — MAGNESIUM SULFATE IN WATER 2 G: 40 INJECTION, SOLUTION INTRAVENOUS at 01:34

## 2017-05-05 RX ADMIN — POTASSIUM CHLORIDE 40 MEQ: 400 INJECTION, SOLUTION INTRAVENOUS at 11:03

## 2017-05-05 RX ADMIN — SODIUM CHLORIDE 4 UNITS/HR: 9 INJECTION, SOLUTION INTRAVENOUS at 14:06

## 2017-05-05 RX ADMIN — HEPARIN SODIUM 5000 UNITS: 5000 INJECTION, SOLUTION INTRAVENOUS; SUBCUTANEOUS at 05:48

## 2017-05-05 RX ADMIN — SODIUM CHLORIDE: 9 INJECTION, SOLUTION INTRAVENOUS at 00:22

## 2017-05-05 RX ADMIN — POTASSIUM CHLORIDE 20 MEQ: 14.9 INJECTION, SOLUTION INTRAVENOUS at 15:54

## 2017-05-05 RX ADMIN — OXYCODONE HYDROCHLORIDE 2.5 MG: 5 TABLET ORAL at 00:22

## 2017-05-05 RX ADMIN — ACETAMINOPHEN 650 MG: 325 TABLET, FILM COATED ORAL at 17:28

## 2017-05-05 RX ADMIN — POTASSIUM PHOSPHATE, MONOBASIC AND POTASSIUM PHOSPHATE, DIBASIC: 224; 236 INJECTION, SOLUTION INTRAVENOUS at 01:37

## 2017-05-05 RX ADMIN — INSULIN HUMAN: 100 INJECTION, SOLUTION PARENTERAL at 01:45

## 2017-05-05 RX ADMIN — HEPARIN SODIUM 5000 UNITS: 5000 INJECTION, SOLUTION INTRAVENOUS; SUBCUTANEOUS at 14:12

## 2017-05-05 RX ADMIN — HEPARIN SODIUM 5000 UNITS: 5000 INJECTION, SOLUTION INTRAVENOUS; SUBCUTANEOUS at 21:08

## 2017-05-05 RX ADMIN — ONDANSETRON 4 MG: 2 INJECTION, SOLUTION INTRAMUSCULAR; INTRAVENOUS at 01:27

## 2017-05-05 RX ADMIN — OXYCODONE HYDROCHLORIDE 2.5 MG: 5 TABLET ORAL at 23:34

## 2017-05-05 RX ADMIN — MAGNESIUM SULFATE IN WATER 2 G: 40 INJECTION, SOLUTION INTRAVENOUS at 14:08

## 2017-05-05 RX ADMIN — POTASSIUM CHLORIDE 40 MEQ: 400 INJECTION, SOLUTION INTRAVENOUS at 19:49

## 2017-05-05 RX ADMIN — OXYCODONE HYDROCHLORIDE 2.5 MG: 5 TABLET ORAL at 04:25

## 2017-05-05 RX ADMIN — POTASSIUM CHLORIDE 20 MEQ: 14.9 INJECTION, SOLUTION INTRAVENOUS at 07:41

## 2017-05-05 RX ADMIN — DEXTROSE AND SODIUM CHLORIDE: 10; .45 INJECTION, SOLUTION INTRAVENOUS at 17:24

## 2017-05-05 RX ADMIN — DEXTROSE AND SODIUM CHLORIDE: 10; .45 INJECTION, SOLUTION INTRAVENOUS at 10:35

## 2017-05-05 RX ADMIN — DEXTROSE AND SODIUM CHLORIDE: 5; .45 INJECTION, SOLUTION INTRAVENOUS at 02:38

## 2017-05-05 RX ADMIN — DEXTROSE AND SODIUM CHLORIDE: 5; .45 INJECTION, SOLUTION INTRAVENOUS at 07:05

## 2017-05-05 ASSESSMENT — ENCOUNTER SYMPTOMS
FALLS: 0
COUGH: 0
FOCAL WEAKNESS: 0
EYE PAIN: 0
SPEECH CHANGE: 0
EYE REDNESS: 0
NAUSEA: 1
STRIDOR: 0
HALLUCINATIONS: 0
HEMOPTYSIS: 0
ORTHOPNEA: 0
SPUTUM PRODUCTION: 0
DIARRHEA: 0
EYE DISCHARGE: 0
NECK PAIN: 0
ABDOMINAL PAIN: 1
SHORTNESS OF BREATH: 0
HEADACHES: 0
BACK PAIN: 0
NERVOUS/ANXIOUS: 0
SEIZURES: 0
BLURRED VISION: 0
LOSS OF CONSCIOUSNESS: 0
MEMORY LOSS: 0
BLOOD IN STOOL: 0
FLANK PAIN: 0
PALPITATIONS: 0
VOMITING: 0
FEVER: 0

## 2017-05-05 ASSESSMENT — PAIN SCALES - GENERAL
PAINLEVEL_OUTOF10: 5
PAINLEVEL_OUTOF10: 7
PAINLEVEL_OUTOF10: 5
PAINLEVEL_OUTOF10: 3
PAINLEVEL_OUTOF10: 5
PAINLEVEL_OUTOF10: 0
PAINLEVEL_OUTOF10: 2
PAINLEVEL_OUTOF10: 3
PAINLEVEL_OUTOF10: 0
PAINLEVEL_OUTOF10: 3

## 2017-05-05 ASSESSMENT — LIFESTYLE VARIABLES: SUBSTANCE_ABUSE: 0

## 2017-05-05 NOTE — PROGRESS NOTES
Luna from Lab called with critical result of CO2 of 7 at 0050. Critical lab result read back to Luna.   Dr. Hanks notified of critical lab result at 0115.  Critical lab result read back by Dr. Hanks.

## 2017-05-05 NOTE — ED NOTES
"Chief Complaint   Patient presents with   • Painful Urination     x 3 days   • Low Back Pain     x 3 days     /84 mmHg  Pulse 108  Temp(Src) 36.3 °C (97.4 °F)  Resp 16  Ht 1.651 m (5' 5\")  Wt 77.9 kg (171 lb 11.8 oz)  BMI 28.58 kg/m2  SpO2 99%  LMP 04/13/2017    "

## 2017-05-05 NOTE — PROCEDURES
Procedure: central venous catheter placement  Location: right IJ  indication- no iv access, DKA  Patient consented prior to procedure, prepped and draped in sterile fashion.  A triple lumen catheter was placed via ultrasound guidence and seldinger technique  Estimated blood loss less than 2cc  Patient tolerated procedure well  No complications noted  Stat cxr pending.

## 2017-05-05 NOTE — PROGRESS NOTES
DKA protocol in place. Hourly blood sugars in place. Pt denies needing to void, verbalizes understanding to call RN for assist. Pt's mother Flaquito called for update, per pt ok to give information to her mother.

## 2017-05-05 NOTE — ED PROVIDER NOTES
ED Provider Note    CHIEF COMPLAINT  Chief Complaint   Patient presents with   • Painful Urination     x 3 days   • Low Back Pain     x 3 days       HPI  Alis Spakrs is a 26 y.o. female who presents for evaluation of urinary symptoms. For the past 3 days the patient has had painful urination. She's had urgency and increased frequency. She is also had some low back discomfort. She denies any fevers or chills. She's had no vomiting. She is a diabetic and states her blood sugars were fine for the past few days and then today been in the 200s. She has a history of previous urinary tract infections and states that's what this feels like.    REVIEW OF SYSTEMS  See HPI for further details. All other systems are negative.     PAST MEDICAL HISTORY  Past Medical History   Diagnosis Date   • Diabetes type 1, controlled (CMS-[facility])      tests 4-5 times daily   • DKA (diabetic ketoacidoses) (CMS-[facility])    • UTI (urinary tract infection)    • Kidney infection    • Pneumonia    • Backpain    • Bronchitis    • Fall    • Gastroparesis        FAMILY HISTORY  Family History   Problem Relation Age of Onset   • Cancer       breasts, multiple family members       SOCIAL HISTORY  Social History     Social History   • Marital Status: Single     Spouse Name: N/A   • Number of Children: N/A   • Years of Education: N/A     Social History Main Topics   • Smoking status: Never Smoker    • Smokeless tobacco: Never Used   • Alcohol Use: No   • Drug Use: No   • Sexual Activity: No     Other Topics Concern   • None     Social History Narrative       SURGICAL HISTORY  Past Surgical History   Procedure Laterality Date   • Gastroscopy-endo  9/18/2014     Performed by Sylvain PERRY M.D. at [facility]   • Gastroscopy with biopsy  9/18/2014     Performed by Sylvain PERRY M.D. at [facility]   • Other       surgery to patch lung after pneumor from central line placement       CURRENT MEDICATIONS  Home Medications     **Home  "medications have not yet been reviewed for this encounter**          ALLERGIES  Allergies   Allergen Reactions   • Pcn [Penicillins] Shortness of Breath and Swelling     Per patient's Mom, patient tolerates Keflex   • Promethazine Vomiting   • Lisinopril      Per historical: Reported pedal swelling. No facial/angioedema or rash nor respiratory symptoms.        PHYSICAL EXAM  VITAL SIGNS: /84 mmHg  Pulse 108  Temp(Src) 36.3 °C (97.4 °F)  Resp 16  Ht 1.651 m (5' 5\")  Wt 77.9 kg (171 lb 11.8 oz)  BMI 28.58 kg/m2  SpO2 99%  LMP 04/13/2017    Constitutional: Well developed, Well nourished, No acute distress, Non-toxic appearance.   HENT: Normocephalic, Atraumatic.   Eyes:  EOMI, Conjunctiva normal, No discharge.   Cardiovascular: Slight tachycardia.   Thorax & Lungs: No respiratory distress.   Abdomen: Soft, slight suprapubic tenderness without guarding or rebound. No masses.  Skin: Warm, Dry.   Musculoskeletal: Good range of motion in all major joints.  Neurologic: Awake alert.    COURSE & MEDICAL DECISION MAKING  Pertinent Labs & Imaging studies reviewed. (See chart for details)  This is a 26-year-old here for evaluation of abdominal pain and back pain. She's been having dysuria over the past 3 days. She is an insulin-dependent diabetic. She hasn't had any fevers. She's had no vomiting. She has a history of urinary tract infections and states that's what it feels like. Initially a urine is obtained and shows no evidence of infection therefore laboratories are ordered. These include a CBC which is normal. Chemistries are notable for a glucose of 404 in this known type I diabetic. Bicarb is 9. This appears to represent DKA. Potassium is slightly low at 3.4. Lipase is normal. There is function studies are normal with the exception of a total bilirubin being minimally elevated at 1.6 and alkaline phosphatase minimally elevated at 106. The nurses have had a very difficult time getting an IV in this patient. " I've discussed the results of the test with the patient and her mother and she understands that she will require hospitalization and treatment. I discussed the case with Dr. Cedillo of the hospitalist service and he will admit the patient and states that he will place a central line due to lack of peripheral IV access. Due to her lower abdominal discomfort a CT scan has been ordered and will be obtained after the central line is placed.    FINAL IMPRESSION  1. DKA in a type I diabetic  2. Abdominal pain  3.         Electronically signed by: Luc Walton, 5/4/2017 6:50 PM

## 2017-05-05 NOTE — PROGRESS NOTES
Report received from night RN this AM, POC discussed. Assessment completed. Lines and gtts verified. POC discussed with pt. Call light in reach and bed alarm on.

## 2017-05-05 NOTE — PROGRESS NOTES
The Medication Reconciliation process has been completed by interviewing the patient    Allergies have been reviewed  Antibiotic use in 30 days - NONE    Home Pharmacy:  [facility] [address]

## 2017-05-05 NOTE — H&P
PRIMARY CARE PROVIDER:  [facility].    CHIEF COMPLAINT:  Abdominal pain.    HISTORY OF PRESENT ILLNESS:  This is a pleasant 26-year-old type 1 diabetic   who presents to the emergency room with abdominal pain over the last 2 days.    It is located over the right and left lower quadrants without radiation and is   sharp in nature.  She has also had some painful urination for the last 3 days   with low back pain.  She denies any flank tenderness.  She has had some   nausea, no vomiting.  She has been constipated.  No hematochezia.  Denies any   shortness of breath, cough, fevers, or chills.    PAST MEDICAL HISTORY:  1.  Type 1 diabetes.  2.  Gastroparesis, diabetic related.    PAST SURGICAL HISTORY:  None.    ALLERGIES:  1.  PENICILLIN.  2.  PROMETHAZINE.  3.  LISINOPRIL.    CURRENT MEDICATIONS:  1.  Lipitor 20 mg a day.  2.  Vitamin D 1 tab a day, presumed to be 1000 units.  3.  Children's Advil 3 tabs as needed.  4.  NovoLog insulin 1-6 units sliding scale 3 times a day before meals.  5.  Lantus insulin 25 units ____ times a day.  6.  Reglan 10 mg 3 times a day with meals.  7.  Zofran 4 mg as needed every 4 hours.    SOCIAL HISTORY:  No tobacco, alcohol or drugs.    FAMILY HISTORY:  Noncontributory.    PHYSICAL EXAMINATION:  VITAL SIGNS:  Temperature is 36.3, heart rate 108, respirations are 16, blood   pressure 128/84.  She is 99% on room air.  LUNGS:  Clear to auscultation bilaterally.  HEART:  Tachycardic.  ABDOMEN:  Mildly tender in the right and left lower quadrants without rebound   tenderness or guarding.  EXTREMITIES:  Joints are intact without drainage or effusion.  SKIN:  Intact without lesion or rash.  HEENT:  Oropharynx is clear.  Extraocular movements are intact.  NEUROLOGIC:  She is alert and oriented, otherwise nonfocal.    RESULTS REVIEW:  CBC is unremarkable.  Metabolic panel, potassium is 3.4, CO2   is 9, anion gap is 15, glucose 404, alkaline phosphatase 106, total bilirubin   1.6, AST and ALT  are normal.  Urinalysis shows proteinuria and glucose,   negative for nitrites, trace blood, negative leukocyte esterase.  Pregnancy   test is negative.    ASSESSMENT:  1.  Diabetic ketoacidosis.  2.  Abdominal pain, possibly related to diabetic ketoacidosis versus actually   leading to diabetic ketoacidosis.  She has no evidence for an overt infectious   process.  Her laboratory work and her exam is relatively benign.  3.  Dysuria without evidence for urinary tract infection.    PLAN:  The patient will be admitted to our intensive care unit, placed on an   insulin drip and diabetic ketoacidosis protocol, electrolyte correction and   intravenous fluids.  CT scan of the abdomen will be obtained and I would like   to repeat her urinalysis as she had a urine dip down in the emergency   department and get a formal urinalysis.  She will be provided with symptomatic   control of her pain for the time being, I would like to hold off on   antibiotics until we are certain or have disproved that she has an actual   infection.  At the moment, I see no other clinical evidence for ____ pain. She   will be placed under our bowel protocol.  Central venous catheter has been   placed as the patient had no IV access and was unable to get a CT scan, she   has very difficult IV access.    Critical care time with this patient is 35 minutes not including procedures.       ____________________________________     MD ANJEL ROWAN / DUAY    DD:  05/04/2017 22:24:37  DT:  05/04/2017 23:48:15    D#:  [id number]  Job#:  [id number]    cc: [facility]

## 2017-05-05 NOTE — CARE PLAN
Problem: Safety  Goal: Will remain free from falls  Intervention: Implement fall precautions  Treaded slipper socks in place, bed in low position, pt near nurses station, bed alarm on and call light in reach. Pt will remain free from falls.       Problem: Knowledge Deficit  Goal: Knowledge of disease process/condition, treatment plan, diagnostic tests, and medications will improve  Intervention: Explain information regarding disease process/condition, treatment plan, diagnostic tests, and medications and document in education  POC discussed with pt. Pt verbalized understanding. Finger sticks every hour with insulin gtt.       Problem: Pain Management  Goal: Pain level will decrease to patient’s comfort goal  Intervention: Follow pain managment plan developed in collaboration with patient and Interdisciplinary Team  PRN oxycodone for pain, pt comfortable with 5/10 on pain scale.

## 2017-05-05 NOTE — PROGRESS NOTES
Hospital Medicine Progress Note, Adult, Complex               Author: Pa Urbina Date & Time created: 5/5/2017  1:27 PM     Interval History:  Pt admitted with DKA seen in the ICU on insulin drip    Review of Systems:  Review of Systems   Constitutional: Positive for malaise/fatigue. Negative for fever.   HENT: Negative for congestion, ear discharge and nosebleeds.    Eyes: Negative for blurred vision, pain, discharge and redness.   Respiratory: Negative for cough, hemoptysis, sputum production, shortness of breath and stridor.    Cardiovascular: Negative for chest pain, palpitations, orthopnea and leg swelling.   Gastrointestinal: Positive for nausea and abdominal pain (significantly improved). Negative for vomiting, diarrhea, blood in stool and melena.   Genitourinary: Negative for dysuria, hematuria and flank pain.   Musculoskeletal: Negative for back pain, joint pain, falls and neck pain.   Skin: Negative.    Neurological: Negative for speech change, focal weakness, seizures, loss of consciousness and headaches.   Psychiatric/Behavioral: Negative for hallucinations, memory loss and substance abuse. The patient is not nervous/anxious.    All other systems reviewed and are negative.      Physical Exam:  Physical Exam   Constitutional: She is oriented to person, place, and time. She appears well-developed and well-nourished.   HENT:   Head: Normocephalic and atraumatic.   Right Ear: External ear normal.   Left Ear: External ear normal.   Mouth/Throat: No oropharyngeal exudate.   Eyes: Conjunctivae are normal. Right eye exhibits no discharge. Left eye exhibits no discharge. No scleral icterus.   Neck: Neck supple. No JVD present. No tracheal deviation present.   Cardiovascular: Normal rate and regular rhythm.  Exam reveals no gallop and no friction rub.    No murmur heard.  Pulmonary/Chest: Effort normal and breath sounds normal. No stridor. No respiratory distress. She has no wheezes. She has no rales. She  exhibits no tenderness.   Abdominal: Soft. Bowel sounds are normal. She exhibits no distension. There is no tenderness. There is no rebound.   Musculoskeletal: She exhibits no edema or tenderness.   Neurological: She is alert and oriented to person, place, and time. No cranial nerve deficit. She exhibits normal muscle tone.   Skin: Skin is warm and dry. She is not diaphoretic. No cyanosis. Nails show no clubbing.   Psychiatric: She has a normal mood and affect. Her behavior is normal.   Nursing note and vitals reviewed.      Labs:        Invalid input(s): [id number]      Recent Labs      17   1035   SODIUM  140  141  140   POTASSIUM  3.5*  3.7  3.1*   CHLORIDE  118*  121*  123*   CO2  7*  12*  13*   BUN  9  7*  7*   CREATININE  0.74  0.55  0.54   MAGNESIUM  1.7   --    --    PHOSPHORUS  1.5*   --    --    CALCIUM  7.5*  7.3*  7.2*     Recent Labs      17   1035   ALTSGPT  22  19   --    --    ASTSGOT  17  16   --    --    ALKPHOSPHAT  106*  96   --    --    TBILIRUBIN  1.6*  1.3   --    --    LIPASE  24   --    --    --    GLUCOSE  404*  315*  219*  143*     Recent Labs      17   0005   RBC  4.90  4.60   HEMOGLOBIN  14.1  13.2   HEMATOCRIT  40.8  37.9   PLATELETCT  206  180     Recent Labs      17   0005   WBC  7.6  7.9   NEUTSPOLYS  63.40  49.90   LYMPHOCYTES  25.70  36.70   MONOCYTES  8.30  9.50   EOSINOPHILS  1.70  2.00   BASOPHILS  0.50  0.60   ASTSGOT  17  16   ALTSGPT  22  19   ALKPHOSPHAT  106*  96   TBILIRUBIN  1.6*  1.3           Hemodynamics:  Temp (24hrs), Av.7 °C (98 °F), Min:36.3 °C (97.4 °F), Max:37 °C (98.6 °F)  Temperature: 36.4 °C (97.6 °F)  Pulse  Av  Min: 85  Max: 127Heart Rate (Monitored): 84  Blood Pressure: 128/84 mmHg, NIBP: (!) 97/65 mmHg     Respiratory:    Respiration: 14, Pulse Oximetry: 98 %           Fluids:    Intake/Output Summary  (Last 24 hours) at 05/05/17 1327  Last data filed at 05/05/17 1200   Gross per 24 hour   Intake 5313.73 ml   Output   2700 ml   Net 2613.73 ml     Weight: 77.1 kg (169 lb 15.6 oz)  GI/Nutrition:  Orders Placed This Encounter   Procedures   • Diet NPO     Standing Status: Standing      Number of Occurrences: 1      Standing Expiration Date:      Order Specific Question:  Restrict to:     Answer:  Ice Chips [2]     Medical Decision Making, by Problem:  Active Hospital Problems    Diagnosis   * DKA  -continue insulin drip, monitor cbg's and chem pannel and adjust fluids and insulin accordingly   • Hypokalemia [E87.6]  -replete and monitor   • Abdominal pain [R10.9]  -resolving likely related to DKA and n/V   • Plan of care reviewed with patient and discussed with nursing staff     >Patient is critically ill.   >The patient is having :DKA  >The vital organ system that is effected is the:endocrine   >If untreated there is a high chance of deterioration into: renal failure, death  >The critical care that I am providing today is: fluid and insulin drip management and electrolyte replacement  >The critical care that has been undertaken is medically complex.   >There has been no overlap in critical care time.   Critical care time not including procedures, no overlap: 35  minutes            Labs reviewed and Medications reviewed  Pitt catheter: No Pitt  Central line in place: Need for access    DVT Prophylaxis: Heparin

## 2017-05-05 NOTE — PROGRESS NOTES
DKA protocol in place, hourly blood sugars, no c/o pain. Call light in reach and bed alarm on. Serial BMP every 4 hours. No needs from pt at this time.

## 2017-05-06 LAB
ANION GAP SERPL CALC-SCNC: 4 MMOL/L (ref 0–11.9)
ANION GAP SERPL CALC-SCNC: 5 MMOL/L (ref 0–11.9)
ANION GAP SERPL CALC-SCNC: 6 MMOL/L (ref 0–11.9)
ANION GAP SERPL CALC-SCNC: 7 MMOL/L (ref 0–11.9)
ANION GAP SERPL CALC-SCNC: 7 MMOL/L (ref 0–11.9)
ANION GAP SERPL CALC-SCNC: 8 MMOL/L (ref 0–11.9)
BUN SERPL-MCNC: 5 MG/DL (ref 8–22)
BUN SERPL-MCNC: <5 MG/DL (ref 8–22)
CALCIUM SERPL-MCNC: 7.5 MG/DL (ref 8.4–10.2)
CALCIUM SERPL-MCNC: 7.5 MG/DL (ref 8.4–10.2)
CALCIUM SERPL-MCNC: 7.7 MG/DL (ref 8.4–10.2)
CALCIUM SERPL-MCNC: 7.8 MG/DL (ref 8.4–10.2)
CALCIUM SERPL-MCNC: 7.8 MG/DL (ref 8.4–10.2)
CALCIUM SERPL-MCNC: 7.9 MG/DL (ref 8.4–10.2)
CHLORIDE SERPL-SCNC: 110 MMOL/L (ref 96–112)
CHLORIDE SERPL-SCNC: 112 MMOL/L (ref 96–112)
CHLORIDE SERPL-SCNC: 114 MMOL/L (ref 96–112)
CHLORIDE SERPL-SCNC: 115 MMOL/L (ref 96–112)
CO2 SERPL-SCNC: 14 MMOL/L (ref 20–33)
CO2 SERPL-SCNC: 15 MMOL/L (ref 20–33)
CO2 SERPL-SCNC: 17 MMOL/L (ref 20–33)
CO2 SERPL-SCNC: 17 MMOL/L (ref 20–33)
CO2 SERPL-SCNC: 18 MMOL/L (ref 20–33)
CO2 SERPL-SCNC: 18 MMOL/L (ref 20–33)
CREAT SERPL-MCNC: 0.46 MG/DL (ref 0.5–1.4)
CREAT SERPL-MCNC: 0.46 MG/DL (ref 0.5–1.4)
CREAT SERPL-MCNC: 0.49 MG/DL (ref 0.5–1.4)
CREAT SERPL-MCNC: 0.51 MG/DL (ref 0.5–1.4)
CREAT SERPL-MCNC: 0.57 MG/DL (ref 0.5–1.4)
CREAT SERPL-MCNC: <0.3 MG/DL (ref 0.5–1.4)
GFR SERPL CREATININE-BSD FRML MDRD: >60 ML/MIN/1.73 M 2
GLUCOSE BLD-MCNC: 117 MG/DL (ref 65–99)
GLUCOSE BLD-MCNC: 133 MG/DL (ref 65–99)
GLUCOSE BLD-MCNC: 146 MG/DL (ref 65–99)
GLUCOSE BLD-MCNC: 147 MG/DL (ref 65–99)
GLUCOSE BLD-MCNC: 149 MG/DL (ref 65–99)
GLUCOSE BLD-MCNC: 163 MG/DL (ref 65–99)
GLUCOSE BLD-MCNC: 171 MG/DL (ref 65–99)
GLUCOSE BLD-MCNC: 174 MG/DL (ref 65–99)
GLUCOSE BLD-MCNC: 185 MG/DL (ref 65–99)
GLUCOSE BLD-MCNC: 192 MG/DL (ref 65–99)
GLUCOSE BLD-MCNC: 194 MG/DL (ref 65–99)
GLUCOSE BLD-MCNC: 196 MG/DL (ref 65–99)
GLUCOSE BLD-MCNC: 199 MG/DL (ref 65–99)
GLUCOSE BLD-MCNC: 200 MG/DL (ref 65–99)
GLUCOSE BLD-MCNC: 203 MG/DL (ref 65–99)
GLUCOSE BLD-MCNC: 210 MG/DL (ref 65–99)
GLUCOSE BLD-MCNC: 214 MG/DL (ref 65–99)
GLUCOSE BLD-MCNC: 215 MG/DL (ref 65–99)
GLUCOSE BLD-MCNC: 216 MG/DL (ref 65–99)
GLUCOSE BLD-MCNC: 217 MG/DL (ref 65–99)
GLUCOSE BLD-MCNC: 218 MG/DL (ref 65–99)
GLUCOSE BLD-MCNC: 219 MG/DL (ref 65–99)
GLUCOSE BLD-MCNC: 228 MG/DL (ref 65–99)
GLUCOSE BLD-MCNC: 234 MG/DL (ref 65–99)
GLUCOSE BLD-MCNC: 239 MG/DL (ref 65–99)
GLUCOSE BLD-MCNC: 245 MG/DL (ref 65–99)
GLUCOSE BLD-MCNC: 252 MG/DL (ref 65–99)
GLUCOSE SERPL-MCNC: 193 MG/DL (ref 65–99)
GLUCOSE SERPL-MCNC: 204 MG/DL (ref 65–99)
GLUCOSE SERPL-MCNC: 207 MG/DL (ref 65–99)
GLUCOSE SERPL-MCNC: 208 MG/DL (ref 65–99)
GLUCOSE SERPL-MCNC: 232 MG/DL (ref 65–99)
GLUCOSE SERPL-MCNC: 247 MG/DL (ref 65–99)
MAGNESIUM SERPL-MCNC: 1.8 MG/DL (ref 1.5–2.5)
PHOSPHATE SERPL-MCNC: 1 MG/DL (ref 2.5–4.5)
POTASSIUM SERPL-SCNC: 3.5 MMOL/L (ref 3.6–5.5)
POTASSIUM SERPL-SCNC: 3.6 MMOL/L (ref 3.6–5.5)
POTASSIUM SERPL-SCNC: 3.9 MMOL/L (ref 3.6–5.5)
SODIUM SERPL-SCNC: 135 MMOL/L (ref 135–145)
SODIUM SERPL-SCNC: 135 MMOL/L (ref 135–145)
SODIUM SERPL-SCNC: 136 MMOL/L (ref 135–145)
SODIUM SERPL-SCNC: 137 MMOL/L (ref 135–145)

## 2017-05-06 PROCEDURE — 700105 HCHG RX REV CODE 258: Performed by: PHARMACIST

## 2017-05-06 PROCEDURE — 83735 ASSAY OF MAGNESIUM: CPT

## 2017-05-06 PROCEDURE — 700111 HCHG RX REV CODE 636 W/ 250 OVERRIDE (IP): Performed by: HOSPITALIST

## 2017-05-06 PROCEDURE — 82962 GLUCOSE BLOOD TEST: CPT | Mod: 91

## 2017-05-06 PROCEDURE — 99291 CRITICAL CARE FIRST HOUR: CPT | Performed by: HOSPITALIST

## 2017-05-06 PROCEDURE — 80048 BASIC METABOLIC PNL TOTAL CA: CPT | Mod: 91

## 2017-05-06 PROCEDURE — 84100 ASSAY OF PHOSPHORUS: CPT

## 2017-05-06 PROCEDURE — 700101 HCHG RX REV CODE 250: Performed by: HOSPITALIST

## 2017-05-06 PROCEDURE — 700105 HCHG RX REV CODE 258: Performed by: HOSPITALIST

## 2017-05-06 PROCEDURE — 770022 HCHG ROOM/CARE - ICU (200)

## 2017-05-06 PROCEDURE — A9270 NON-COVERED ITEM OR SERVICE: HCPCS | Performed by: HOSPITALIST

## 2017-05-06 PROCEDURE — 700102 HCHG RX REV CODE 250 W/ 637 OVERRIDE(OP): Performed by: FAMILY MEDICINE

## 2017-05-06 PROCEDURE — 700102 HCHG RX REV CODE 250 W/ 637 OVERRIDE(OP): Performed by: HOSPITALIST

## 2017-05-06 PROCEDURE — 700102 HCHG RX REV CODE 250 W/ 637 OVERRIDE(OP): Performed by: PHARMACIST

## 2017-05-06 RX ORDER — DEXTROSE MONOHYDRATE 100 MG/ML
INJECTION, SOLUTION INTRAVENOUS CONTINUOUS
Status: DISCONTINUED | OUTPATIENT
Start: 2017-05-06 | End: 2017-05-06

## 2017-05-06 RX ORDER — MAGNESIUM SULFATE HEPTAHYDRATE 40 MG/ML
2 INJECTION, SOLUTION INTRAVENOUS ONCE
Status: COMPLETED | OUTPATIENT
Start: 2017-05-06 | End: 2017-05-06

## 2017-05-06 RX ORDER — INSULIN GLARGINE 100 [IU]/ML
30 INJECTION, SOLUTION SUBCUTANEOUS ONCE
Status: COMPLETED | OUTPATIENT
Start: 2017-05-06 | End: 2017-05-06

## 2017-05-06 RX ORDER — POTASSIUM CHLORIDE 14.9 MG/ML
20 INJECTION INTRAVENOUS ONCE
Status: COMPLETED | OUTPATIENT
Start: 2017-05-06 | End: 2017-05-06

## 2017-05-06 RX ADMIN — INSULIN GLARGINE 30 UNITS: 100 INJECTION, SOLUTION SUBCUTANEOUS at 23:45

## 2017-05-06 RX ADMIN — HEPARIN SODIUM 5000 UNITS: 5000 INJECTION, SOLUTION INTRAVENOUS; SUBCUTANEOUS at 14:35

## 2017-05-06 RX ADMIN — OXYCODONE HYDROCHLORIDE 2.5 MG: 5 TABLET ORAL at 19:47

## 2017-05-06 RX ADMIN — POTASSIUM CHLORIDE 20 MEQ: 14.9 INJECTION, SOLUTION INTRAVENOUS at 05:24

## 2017-05-06 RX ADMIN — DEXTROSE MONOHYDRATE: 100 INJECTION, SOLUTION INTRAVENOUS at 15:10

## 2017-05-06 RX ADMIN — SODIUM CHLORIDE 3 UNITS/HR: 9 INJECTION, SOLUTION INTRAVENOUS at 15:12

## 2017-05-06 RX ADMIN — POTASSIUM CHLORIDE 20 MEQ: 14.9 INJECTION, SOLUTION INTRAVENOUS at 00:22

## 2017-05-06 RX ADMIN — POTASSIUM CHLORIDE 20 MEQ: 14.9 INJECTION, SOLUTION INTRAVENOUS at 15:57

## 2017-05-06 RX ADMIN — HEPARIN SODIUM 5000 UNITS: 5000 INJECTION, SOLUTION INTRAVENOUS; SUBCUTANEOUS at 05:23

## 2017-05-06 RX ADMIN — DEXTROSE MONOHYDRATE: 100 INJECTION, SOLUTION INTRAVENOUS at 11:54

## 2017-05-06 RX ADMIN — DEXTROSE AND SODIUM CHLORIDE: 10; .45 INJECTION, SOLUTION INTRAVENOUS at 07:01

## 2017-05-06 RX ADMIN — POTASSIUM CHLORIDE 20 MEQ: 14.9 INJECTION, SOLUTION INTRAVENOUS at 19:39

## 2017-05-06 RX ADMIN — DEXTROSE MONOHYDRATE: 100 INJECTION, SOLUTION INTRAVENOUS at 18:59

## 2017-05-06 RX ADMIN — ATORVASTATIN CALCIUM 20 MG: 40 TABLET, FILM COATED ORAL at 20:36

## 2017-05-06 RX ADMIN — POTASSIUM CHLORIDE 20 MEQ: 14.9 INJECTION, SOLUTION INTRAVENOUS at 07:32

## 2017-05-06 RX ADMIN — POTASSIUM PHOSPHATE, MONOBASIC AND POTASSIUM PHOSPHATE, DIBASIC 30 MMOL: 224; 236 INJECTION, SOLUTION INTRAVENOUS at 09:39

## 2017-05-06 RX ADMIN — HEPARIN SODIUM 5000 UNITS: 5000 INJECTION, SOLUTION INTRAVENOUS; SUBCUTANEOUS at 20:36

## 2017-05-06 RX ADMIN — MAGNESIUM SULFATE IN WATER 2 G: 40 INJECTION, SOLUTION INTRAVENOUS at 08:30

## 2017-05-06 RX ADMIN — DEXTROSE AND SODIUM CHLORIDE: 10; .45 INJECTION, SOLUTION INTRAVENOUS at 00:22

## 2017-05-06 ASSESSMENT — ENCOUNTER SYMPTOMS
CHILLS: 0
SPUTUM PRODUCTION: 0
SEIZURES: 0
FOCAL WEAKNESS: 0
SPEECH CHANGE: 0
LOSS OF CONSCIOUSNESS: 0
HEADACHES: 0
ABDOMINAL PAIN: 1
HEMOPTYSIS: 0
ORTHOPNEA: 0
SHORTNESS OF BREATH: 0
WEAKNESS: 0
VOMITING: 0
PSYCHIATRIC NEGATIVE: 1
FLANK PAIN: 0
EYE REDNESS: 0
PALPITATIONS: 0
NAUSEA: 0
MUSCULOSKELETAL NEGATIVE: 1
EYE DISCHARGE: 0
STRIDOR: 0
BLOOD IN STOOL: 0
DIARRHEA: 0
COUGH: 0
FEVER: 0

## 2017-05-06 ASSESSMENT — PAIN SCALES - GENERAL
PAINLEVEL_OUTOF10: 7
PAINLEVEL_OUTOF10: 4
PAINLEVEL_OUTOF10: 0
PAINLEVEL_OUTOF10: 2
PAINLEVEL_OUTOF10: 0

## 2017-05-06 NOTE — CARE PLAN
Problem: Knowledge Deficit  Goal: Knowledge of disease process/condition, treatment plan, diagnostic tests, and medications will improve  Outcome: PROGRESSING AS EXPECTED  Pt verbalizes understanding and agrees with plan of care.

## 2017-05-06 NOTE — CARE PLAN
Problem: Safety  Goal: Will remain free from falls  Bed alarm on, bed in low position, pt near nurses station and call light in reach. Pt demonstrates correct use of call light. Pt will remain free from falls.     Problem: Knowledge Deficit  Goal: Knowledge of disease process/condition, treatment plan, diagnostic tests, and medications will improve  Intervention: Explain information regarding disease process/condition, treatment plan, diagnostic tests, and medications and document in education  POC discussed with pt. Pt verbalizes understanding. DKA protocol in place, hourly finger sticks and serial BMP every 4 hours.

## 2017-05-06 NOTE — DISCHARGE PLANNING
Medical Social Work    Referral: Pt discussed at IDT rounds this AM.    Intervention: Per dejan, pt lives with sister and expects to d.c home.  No SS needs identified nor any requests for RICHI Tong during rounds.      Plan: SW available for any assistance with d.c planning.    Care Transition Team Assessment    Information Source  Orientation : Oriented x 4  Information Given By: Patient    Readmission Evaluation  Is this a readmission?: No    Elopement Risk  Legal Hold: No  Ambulatory or Self Mobile in Wheelchair: No-Not an Elopement Risk  Elopement Risk: Not at Risk for Elopement    Interdisciplinary Discharge Planning  Does Admitting Nurse Feel This Could be a Complex Discharge?: No  Primary Care Phys[facility]  Lives with - Patient's Self Care Capacity: Other (Comments) (sister)  Patient or legal guardian wants to designate a caregiver (see row info): No  Support Systems: Family Member(s), Friends / Neighbors  Do You Take your Prescribed Medications Regularly: Yes  Able to Return to Previous ADL's: Yes  Mobility Issues: No  Prior Services: None  Patient Expects to be Discharged to:: home  Assistance Needed: Unknown at this Time  Durable Medical Equipment: Not Applicable    Discharge Preparedness  What is your plan after discharge?: Uncertain - pending medical team collaboration  What are your discharge supports?: Sibling         Finances  Prescription Coverage: No  Prescription Coverage Comments: Medical Financial Hardship    Vision / Hearing Impairment  Vision Impairment : No  Hearing Impairment : No    Values / Beliefs / Concerns  Values / Beliefs Concerns : No    Advance Directive  Advance Directive?: None    Domestic Abuse  Have you ever been the victim of abuse or violence?: No  Physical Abuse or Sexual Abuse: No  Verbal Abuse or Emotional Abuse: No  Possible Abuse Reported to:: Not Applicable    Psychological Assessment  History of Substance Abuse: None         Anticipated Discharge  Information  Anticipated discharge disposition: Home

## 2017-05-06 NOTE — PROGRESS NOTES
Dr. Jackelin Urbina here this afternoon, BMP results reviewed at that time. Continue DKA protocol.

## 2017-05-06 NOTE — PROGRESS NOTES
Dr. Jackelin Urbina updated on BS 96 and 1800 BMP sent. Order to decrease insulin gtt to 2 units/hr.

## 2017-05-06 NOTE — PROGRESS NOTES
Hospital Medicine Progress Note, Adult, Complex               Author: Pa Urbina Date & Time created: 5/6/2017  7:29 AM     Interval History:  Pt admitted with DKA     seen in the ICU remains  on insulin drip at 2 units, abd pain improved     Review of Systems:  Review of Systems   Constitutional: Negative for fever and chills.   HENT: Negative for congestion, ear discharge and nosebleeds.    Eyes: Negative for discharge and redness.   Respiratory: Negative for cough, hemoptysis, sputum production, shortness of breath and stridor.    Cardiovascular: Negative for chest pain, palpitations, orthopnea and leg swelling.   Gastrointestinal: Positive for abdominal pain (improved). Negative for nausea, vomiting, diarrhea, blood in stool and melena.   Genitourinary: Negative for hematuria and flank pain.   Musculoskeletal: Negative.    Skin: Negative.    Neurological: Negative for speech change, focal weakness, seizures, loss of consciousness, weakness and headaches.   Psychiatric/Behavioral: Negative.    All other systems reviewed and are negative.      Physical Exam:  Physical Exam   Constitutional: She is oriented to person, place, and time. She appears well-developed and well-nourished.   HENT:   Head: Normocephalic and atraumatic.   Right Ear: External ear normal.   Left Ear: External ear normal.   Mouth/Throat: No oropharyngeal exudate.   Eyes: Right eye exhibits no discharge. Left eye exhibits no discharge. No scleral icterus.   Neck: Neck supple. No JVD present. No tracheal deviation present.   Cardiovascular: Normal rate and regular rhythm.  Exam reveals no gallop and no friction rub.    No murmur heard.  Pulmonary/Chest: Effort normal and breath sounds normal. No respiratory distress. She exhibits no tenderness.   Abdominal: Soft. Bowel sounds are normal. She exhibits no distension. There is no tenderness. There is no rebound and no guarding.   Musculoskeletal: She exhibits no edema or tenderness.    Neurological: She is alert and oriented to person, place, and time. No cranial nerve deficit. She exhibits normal muscle tone.   Skin: Skin is warm and dry. She is not diaphoretic. No cyanosis. Nails show no clubbing.   Psychiatric: She has a normal mood and affect. Her behavior is normal.   Nursing note and vitals reviewed.      Labs:        Invalid input(s): [id number]      Recent Labs      17   0645   SODIUM  140   < >  135  136  136   POTASSIUM  3.5*   < >  3.7  3.6  3.5*   CHLORIDE  118*   < >  115*  114*  114*   CO2  7*   < >  15*  17*  18*   BUN  9   < >  7*  5*  <5*   CREATININE  0.74   < >  0.48*  0.46*  0.46*   MAGNESIUM  1.7   --    --   1.8   --    PHOSPHORUS  1.5*   --    --   1.0*   --    CALCIUM  7.5*   < >  7.4*  7.5*  7.5*    < > = values in this interval not displayed.     Recent Labs      17   0645   ALTSGPT  22  19   --    --    --    --    ASTSGOT  17     --    --    --    --    ALKPHOSPHAT  106*  96   --    --    --    --    TBILIRUBIN  1.6*  1.3   --    --    --    --    LIPASE  24   --    --    --    --    --    GLUCOSE  404*  315*   < >  161*  193*  207*    < > = values in this interval not displayed.     Recent Labs      17   0005   RBC  4.90  4.60   HEMOGLOBIN  14.1  13.2   HEMATOCRIT  40.8  37.9   PLATELETCT  206  180     Recent Labs      17   0005   WBC  7.6  7.9   NEUTSPOLYS  63.40  49.90   LYMPHOCYTES  25.70  36.70   MONOCYTES  8.30  9.50   EOSINOPHILS  1.70  2.00   BASOPHILS  0.50  0.60   ASTSGOT  17  16   ALTSGPT  22  19   ALKPHOSPHAT  106*  96   TBILIRUBIN  1.6*  1.3           Hemodynamics:  Temp (24hrs), Av.8 °C (98.2 °F), Min:36.4 °C (97.6 °F), Max:37.2 °C (98.9 °F)  Temperature: 36.7 °C (98.1 °F)  Pulse  Av.6  Min: 76  Max: 127Heart Rate (Monitored): 89  NIBP: (!) 97/63 mmHg      Respiratory:    Respiration: (!) 54, Pulse Oximetry: 98 %           Fluids:    Intake/Output Summary (Last 24 hours) at 05/06/17 0729  Last data filed at 05/06/17 0600   Gross per 24 hour   Intake 4823.6 ml   Output   1900 ml   Net 2923.6 ml        GI/Nutrition:  Orders Placed This Encounter   Procedures   • Diet NPO     Standing Status: Standing      Number of Occurrences: 1      Standing Expiration Date:      Order Specific Question:  Restrict to:     Answer:  Ice Chips [2]     Medical Decision Making, by Problem:  Active Hospital Problems    Diagnosis   * DKA  -improved, continue insulin drip and close  Monitoring of  cbg's and chem pannel      • Hypokalemia [E87.6]  Hypophosphatemia  Hypomagnesemia  -replete and monitor   • Abdominal pain [R10.9]  -improved  likely related to DKA    -CT neg    • Plan of care reviewed with patient and discussed with nursing staff     >Patient is critically ill.   >The patient is having :DKA  >The vital organ system that is effected is the:endocrine   >If untreated there is a high chance of deterioration into: renal failure, death  >The critical care that I am providing today is: fluid and insulin drip management and electrolyte replacement  >The critical care that has been undertaken is medically complex.   >There has been no overlap in critical care time.   Critical care time not including procedures, no overlap: 32  minutes            Labs reviewed and Medications reviewed  Pitt catheter: No Pitt  Central line in place: Need for access    DVT Prophylaxis: Heparin

## 2017-05-06 NOTE — PROGRESS NOTES
Hand off report received from CAIT Hess. Pt care assumed at this time. Plan of care, active orders and medications reviewed. I will continue to monitor.

## 2017-05-06 NOTE — PROGRESS NOTES
Assessment completed. Lines and gtts verified. POC discussed with pt. Pt up BSC and voided. No c/o pain. Dr. Jackelin Urbina at bedside, POC discussed, new orders placed. Call light in reach and bed alarm on.

## 2017-05-06 NOTE — PROGRESS NOTES
Dr. Jackelin Urbina updated on recent BMP results. New orders placed, regular insulin gtt increased to 3 units/hr and IVF changed to D10 @ 150 ml/hr. Continue hourly blood sugars and scheduled BMP.

## 2017-05-07 LAB
ANION GAP SERPL CALC-SCNC: 7 MMOL/L (ref 0–11.9)
BASOPHILS # BLD AUTO: 0.5 % (ref 0–1.8)
BASOPHILS # BLD: 0.03 K/UL (ref 0–0.12)
BUN SERPL-MCNC: 5 MG/DL (ref 8–22)
CALCIUM SERPL-MCNC: 7.8 MG/DL (ref 8.4–10.2)
CHLORIDE SERPL-SCNC: 109 MMOL/L (ref 96–112)
CO2 SERPL-SCNC: 19 MMOL/L (ref 20–33)
CREAT SERPL-MCNC: 0.59 MG/DL (ref 0.5–1.4)
EOSINOPHIL # BLD AUTO: 0.17 K/UL (ref 0–0.51)
EOSINOPHIL NFR BLD: 3.1 % (ref 0–6.9)
ERYTHROCYTE [DISTWIDTH] IN BLOOD BY AUTOMATED COUNT: 41.4 FL (ref 35.9–50)
EST. AVERAGE GLUCOSE BLD GHB EST-MCNC: 306 MG/DL
GFR SERPL CREATININE-BSD FRML MDRD: >60 ML/MIN/1.73 M 2
GLUCOSE BLD-MCNC: 111 MG/DL (ref 65–99)
GLUCOSE BLD-MCNC: 142 MG/DL (ref 65–99)
GLUCOSE BLD-MCNC: 203 MG/DL (ref 65–99)
GLUCOSE SERPL-MCNC: 436 MG/DL (ref 65–99)
HBA1C MFR BLD: 12.3 % (ref 0–5.6)
HCT VFR BLD AUTO: 35.9 % (ref 37–47)
HGB BLD-MCNC: 12.7 G/DL (ref 12–16)
IMM GRANULOCYTES # BLD AUTO: 0.02 K/UL (ref 0–0.11)
IMM GRANULOCYTES NFR BLD AUTO: 0.4 % (ref 0–0.9)
LYMPHOCYTES # BLD AUTO: 2.28 K/UL (ref 1–4.8)
LYMPHOCYTES NFR BLD: 41.2 % (ref 22–41)
MAGNESIUM SERPL-MCNC: 1.8 MG/DL (ref 1.5–2.5)
MCH RBC QN AUTO: 28.9 PG (ref 27–33)
MCHC RBC AUTO-ENTMCNC: 35.4 G/DL (ref 33.6–35)
MCV RBC AUTO: 81.8 FL (ref 81.4–97.8)
MONOCYTES # BLD AUTO: 0.53 K/UL (ref 0–0.85)
MONOCYTES NFR BLD AUTO: 9.6 % (ref 0–13.4)
NEUTROPHILS # BLD AUTO: 2.51 K/UL (ref 2–7.15)
NEUTROPHILS NFR BLD: 45.2 % (ref 44–72)
NRBC # BLD AUTO: 0 K/UL
NRBC BLD AUTO-RTO: 0 /100 WBC
PHOSPHATE SERPL-MCNC: 2 MG/DL (ref 2.5–4.5)
PLATELET # BLD AUTO: 168 K/UL (ref 164–446)
PMV BLD AUTO: 10.7 FL (ref 9–12.9)
POTASSIUM SERPL-SCNC: 4.1 MMOL/L (ref 3.6–5.5)
RBC # BLD AUTO: 4.39 M/UL (ref 4.2–5.4)
SODIUM SERPL-SCNC: 135 MMOL/L (ref 135–145)
WBC # BLD AUTO: 5.5 K/UL (ref 4.8–10.8)

## 2017-05-07 PROCEDURE — 700102 HCHG RX REV CODE 250 W/ 637 OVERRIDE(OP): Performed by: HOSPITALIST

## 2017-05-07 PROCEDURE — 700105 HCHG RX REV CODE 258: Performed by: HOSPITALIST

## 2017-05-07 PROCEDURE — 770006 HCHG ROOM/CARE - MED/SURG/GYN SEMI*

## 2017-05-07 PROCEDURE — 700102 HCHG RX REV CODE 250 W/ 637 OVERRIDE(OP)

## 2017-05-07 PROCEDURE — 85025 COMPLETE CBC W/AUTO DIFF WBC: CPT

## 2017-05-07 PROCEDURE — 700111 HCHG RX REV CODE 636 W/ 250 OVERRIDE (IP): Performed by: FAMILY MEDICINE

## 2017-05-07 PROCEDURE — 83036 HEMOGLOBIN GLYCOSYLATED A1C: CPT

## 2017-05-07 PROCEDURE — 83735 ASSAY OF MAGNESIUM: CPT

## 2017-05-07 PROCEDURE — 82962 GLUCOSE BLOOD TEST: CPT | Mod: 91

## 2017-05-07 PROCEDURE — 700101 HCHG RX REV CODE 250: Performed by: HOSPITALIST

## 2017-05-07 PROCEDURE — 84100 ASSAY OF PHOSPHORUS: CPT

## 2017-05-07 PROCEDURE — 80048 BASIC METABOLIC PNL TOTAL CA: CPT

## 2017-05-07 PROCEDURE — 700102 HCHG RX REV CODE 250 W/ 637 OVERRIDE(OP): Performed by: FAMILY MEDICINE

## 2017-05-07 PROCEDURE — 99232 SBSQ HOSP IP/OBS MODERATE 35: CPT | Performed by: HOSPITALIST

## 2017-05-07 PROCEDURE — 700111 HCHG RX REV CODE 636 W/ 250 OVERRIDE (IP): Performed by: HOSPITALIST

## 2017-05-07 PROCEDURE — A9270 NON-COVERED ITEM OR SERVICE: HCPCS | Performed by: HOSPITALIST

## 2017-05-07 RX ORDER — SODIUM CHLORIDE, SODIUM LACTATE, POTASSIUM CHLORIDE, CALCIUM CHLORIDE 600; 310; 30; 20 MG/100ML; MG/100ML; MG/100ML; MG/100ML
INJECTION, SOLUTION INTRAVENOUS CONTINUOUS
Status: DISCONTINUED | OUTPATIENT
Start: 2017-05-07 | End: 2017-05-08 | Stop reason: HOSPADM

## 2017-05-07 RX ORDER — INSULIN GLARGINE 100 [IU]/ML
30 INJECTION, SOLUTION SUBCUTANEOUS 2 TIMES DAILY
Status: DISCONTINUED | OUTPATIENT
Start: 2017-05-07 | End: 2017-05-08

## 2017-05-07 RX ORDER — MAGNESIUM SULFATE HEPTAHYDRATE 40 MG/ML
INJECTION, SOLUTION INTRAVENOUS
Status: COMPLETED
Start: 2017-05-07 | End: 2017-05-07

## 2017-05-07 RX ADMIN — METOCLOPRAMIDE HYDROCHLORIDE 10 MG: 5 SOLUTION ORAL at 17:35

## 2017-05-07 RX ADMIN — HEPARIN SODIUM 5000 UNITS: 5000 INJECTION, SOLUTION INTRAVENOUS; SUBCUTANEOUS at 14:28

## 2017-05-07 RX ADMIN — SODIUM PHOSPHATE, MONOBASIC, MONOHYDRATE AND SODIUM PHOSPHATE, DIBASIC, ANHYDROUS 30 MMOL: 276; 142 INJECTION, SOLUTION INTRAVENOUS at 11:41

## 2017-05-07 RX ADMIN — INSULIN LISPRO 21 UNITS: 100 INJECTION, SOLUTION INTRAVENOUS; SUBCUTANEOUS at 11:43

## 2017-05-07 RX ADMIN — ATORVASTATIN CALCIUM 20 MG: 40 TABLET, FILM COATED ORAL at 20:41

## 2017-05-07 RX ADMIN — HEPARIN SODIUM 5000 UNITS: 5000 INJECTION, SOLUTION INTRAVENOUS; SUBCUTANEOUS at 06:15

## 2017-05-07 RX ADMIN — MAGNESIUM SULFATE IN DEXTROSE 2 G: 10 INJECTION, SOLUTION INTRAVENOUS at 06:45

## 2017-05-07 RX ADMIN — VITAMIN D, TAB 1000IU (100/BT) 1000 UNITS: 25 TAB at 09:05

## 2017-05-07 RX ADMIN — METOCLOPRAMIDE HYDROCHLORIDE 10 MG: 5 SOLUTION ORAL at 11:42

## 2017-05-07 RX ADMIN — INSULIN LISPRO 16 UNITS: 100 INJECTION, SOLUTION INTRAVENOUS; SUBCUTANEOUS at 17:38

## 2017-05-07 RX ADMIN — INSULIN LISPRO 9 UNITS: 100 INJECTION, SOLUTION INTRAVENOUS; SUBCUTANEOUS at 06:16

## 2017-05-07 RX ADMIN — INSULIN GLARGINE 30 UNITS: 100 INJECTION, SOLUTION SUBCUTANEOUS at 20:41

## 2017-05-07 RX ADMIN — INSULIN LISPRO 16 UNITS: 100 INJECTION, SOLUTION INTRAVENOUS; SUBCUTANEOUS at 09:06

## 2017-05-07 RX ADMIN — SODIUM CHLORIDE, POTASSIUM CHLORIDE, SODIUM LACTATE AND CALCIUM CHLORIDE: 600; 310; 30; 20 INJECTION, SOLUTION INTRAVENOUS at 11:42

## 2017-05-07 RX ADMIN — METOCLOPRAMIDE HYDROCHLORIDE 10 MG: 5 SOLUTION ORAL at 09:05

## 2017-05-07 ASSESSMENT — ENCOUNTER SYMPTOMS
DIARRHEA: 0
PALPITATIONS: 0
ABDOMINAL PAIN: 0
SPEECH CHANGE: 0
SEIZURES: 0
LOSS OF CONSCIOUSNESS: 0
WEAKNESS: 0
NAUSEA: 0
HEMOPTYSIS: 0
FOCAL WEAKNESS: 0
MUSCULOSKELETAL NEGATIVE: 1
COUGH: 0
VOMITING: 0
FEVER: 0
WHEEZING: 0
FLANK PAIN: 0
EYE REDNESS: 0
BLOOD IN STOOL: 0
PSYCHIATRIC NEGATIVE: 1
EYE DISCHARGE: 0
SPUTUM PRODUCTION: 0
CHILLS: 0
DIZZINESS: 0

## 2017-05-07 ASSESSMENT — PAIN SCALES - GENERAL
PAINLEVEL_OUTOF10: 0

## 2017-05-07 NOTE — PROGRESS NOTES
0700-Assumed care,  bedside report received. Pt resting in bed comfortably, AAO. Pt c/o no pain. All lines, tubes, and pumps checked and verified. All orders, labs, and medications reviewed. POC discussed, questions and concerns addressed. Bed locked, fall precautions in place, call light within reach. Will continue to monitor.     0930-Carbohydrate counting pre-prandial insulin order placed per patients home carb counting regimen. Patient given additional dose of humalog for breakfast as she received her sliding scale insulin early this morning before breakfast was served base on her glucose level.     1130-Patient given 21 units humalog per carb count for lunch in addition to extra 1 unit for glucose greater than 150mg/dl per MD order.     1200-Patient had very large BM, declined stool softeners.     1445-Patient transferred to [facility] room 204-2 with all belongings. Report given to receiving RN, Trace. Patient stable, care relinquished.

## 2017-05-07 NOTE — CARE PLAN
Problem: Safety  Goal: Will remain free from injury  Bed in low position, treaded slipper socks on, and call light in reach. Pt instructed to call nurse if ambulating out of bed.         Problem: Knowledge Deficit  Goal: Knowledge of the prescribed therapeutic regimen will improve  Intervention: Discuss information regarding therpeutic regimen and document in education  Keeping pt updated on POC. Pt aware of importance of monitoring for any rebound hyperglycemia after being off the insulin drip.

## 2017-05-07 NOTE — PROGRESS NOTES
Hospital Medicine Progress Note, Adult, Complex               Author: Pa Urbina Date & Time created: 5/7/2017  8:13 AM     Interval History:  Pt admitted with DKA    Weaned off insulin drip overnight,  this am. Denies abd pain    Review of Systems:  Review of Systems   Constitutional: Negative for fever and chills.   HENT: Negative.    Eyes: Negative for discharge and redness.   Respiratory: Negative for cough, hemoptysis, sputum production and wheezing.    Cardiovascular: Negative for chest pain, palpitations and leg swelling.   Gastrointestinal: Negative for nausea, vomiting, abdominal pain, diarrhea, blood in stool and melena.   Genitourinary: Negative for hematuria and flank pain.   Musculoskeletal: Negative.    Skin: Negative.    Neurological: Negative for dizziness, speech change, focal weakness, seizures, loss of consciousness and weakness.   Psychiatric/Behavioral: Negative.    All other systems reviewed and are negative.      Physical Exam:  Physical Exam   Constitutional: She is oriented to person, place, and time. She appears well-developed and well-nourished.   HENT:   Head: Normocephalic and atraumatic.   Mouth/Throat: No oropharyngeal exudate.   Eyes: Conjunctivae are normal. Right eye exhibits no discharge. Left eye exhibits no discharge. No scleral icterus.   Neck: Neck supple. No JVD present. No tracheal deviation present.   Cardiovascular: Normal rate and regular rhythm.  Exam reveals no gallop and no friction rub.    No murmur heard.  Pulmonary/Chest: Effort normal and breath sounds normal. No stridor. No respiratory distress. She has no wheezes. She exhibits no tenderness.   Abdominal: Soft. Bowel sounds are normal. She exhibits no distension. There is no tenderness. There is no rebound.   Musculoskeletal: She exhibits no edema or tenderness.   Neurological: She is alert and oriented to person, place, and time. No cranial nerve deficit. She exhibits normal muscle tone.   Skin:  Skin is warm and dry. She is not diaphoretic. No cyanosis. Nails show no clubbing.   Psychiatric: She has a normal mood and affect. Her behavior is normal.   Nursing note and vitals reviewed.      Labs:        Invalid input(s): [id number]      Recent Labs      17   0240   17   SODIUM  140   < >  136   < >  135  135  135   POTASSIUM  3.5*   < >  3.6   < >  3.9  3.9  4.1   CHLORIDE  118*   < >  114*   < >  112  110  109   CO2  7*   < >  17*   < >  17*  18*  19*   BUN  9   < >  5*   < >  <5*  <5*  5*   CREATININE  0.74   < >  0.46*   < >  0.51  0.57  0.59   MAGNESIUM  1.7   --   1.8   --    --    --   1.8   PHOSPHORUS  1.5*   --   1.0*   --    --    --   2.0*   CALCIUM  7.5*   < >  7.5*   < >  7.8*  7.9*  7.8*    < > = values in this interval not displayed.     Recent Labs      17   ALTSGPT  22  19   --    --    --    --    ASTSGOT  17  16   --    --    --    --    ALKPHOSPHAT  106*  96   --    --    --    --    TBILIRUBIN  1.6*  1.3   --    --    --    --    LIPASE  24   --    --    --    --    --    GLUCOSE  404*  315*   < >  208*  204*  436*    < > = values in this interval not displayed.     Recent Labs      17   RBC  4.90  4.60  4.39   HEMOGLOBIN  14.1  13.2  12.7   HEMATOCRIT  40.8  37.9  35.9*   PLATELETCT  206  180  168     Recent Labs      17   WBC  7.6  7.9  5.5   NEUTSPOLYS  63.40  49.90  45.20   LYMPHOCYTES  25.70  36.70  41.20*   MONOCYTES  8.30  9.50  9.60   EOSINOPHILS  1.70  2.00  3.10   BASOPHILS  0.50  0.60  0.50   ASTSGOT  17  16   --    ALTSGPT  22  19   --    ALKPHOSPHAT  106*  96   --    TBILIRUBIN  1.6*  1.3   --            Hemodynamics:  Temp (24hrs), Av.6 °C (97.9 °F), Min:36.3 °C (97.4 °F), Max:37.1 °C (98.8 °F)  Temperature: 36.5 °C (97.7  °F)  Pulse  Av.4  Min: 76  Max: 127Heart Rate (Monitored): 86  NIBP: (!) 99/60 mmHg     Respiratory:    Respiration: 16, Pulse Oximetry: 97 %           Fluids:    Intake/Output Summary (Last 24 hours) at 17 0813  Last data filed at 17 0700   Gross per 24 hour   Intake 2755.73 ml   Output   3350 ml   Net -594.27 ml        GI/Nutrition:  Orders Placed This Encounter   Procedures   • DIET ORDER     Standing Status: Standing      Number of Occurrences: 1      Standing Expiration Date:      Order Specific Question:  Diet:     Answer:  Diabetic [3]     Medical Decision Making, by Problem:  Active Hospital Problems    Diagnosis   * DKA  -resolved   DM1 uncontrolled  -resume lantus and novolog premeal and ISS  -monitor cbg's and adjust  -check Hba1c   • Hypophosphatemia  Hypomagnesemia  -replete and monitor   • Abdominal pain [R10.9]  -resolved   likely related to DKA    -CT neg except for left ovarian cyst   • Plan of care reviewed with patient and discussed with nursing staff     Transfer to medical floor           Labs reviewed and Medications reviewed  Pitt catheter: No Pitt  Central line in place: Need for access    DVT Prophylaxis: Heparin

## 2017-05-07 NOTE — PROGRESS NOTES
Serial BMP results reached DKA protocol discontinuation criteria. Dr. Hanks notified. Ordered Lantus/sliding scale/diet order/ and discontinuation of insulin gtt and DKA protocol. Pt notified of treatment plan updates.

## 2017-05-08 ENCOUNTER — PATIENT OUTREACH (OUTPATIENT)
Dept: HEALTH INFORMATION MANAGEMENT | Facility: OTHER | Age: 27
End: 2017-05-08

## 2017-05-08 VITALS
RESPIRATION RATE: 18 BRPM | HEIGHT: 65 IN | HEART RATE: 75 BPM | OXYGEN SATURATION: 97 % | DIASTOLIC BLOOD PRESSURE: 75 MMHG | TEMPERATURE: 98.8 F | WEIGHT: 169.97 LBS | BODY MASS INDEX: 28.32 KG/M2 | SYSTOLIC BLOOD PRESSURE: 114 MMHG

## 2017-05-08 PROBLEM — E11.10 DKA (DIABETIC KETOACIDOSES): Status: RESOLVED | Noted: 2017-05-04 | Resolved: 2017-05-08

## 2017-05-08 PROBLEM — R10.9 ABDOMINAL PAIN: Status: RESOLVED | Noted: 2017-05-05 | Resolved: 2017-05-08

## 2017-05-08 LAB
ANION GAP SERPL CALC-SCNC: 1 MMOL/L (ref 0–11.9)
BUN SERPL-MCNC: 8 MG/DL (ref 8–22)
CALCIUM SERPL-MCNC: 7.8 MG/DL (ref 8.4–10.2)
CHLORIDE SERPL-SCNC: 110 MMOL/L (ref 96–112)
CO2 SERPL-SCNC: 25 MMOL/L (ref 20–33)
CREAT SERPL-MCNC: 0.56 MG/DL (ref 0.5–1.4)
GFR SERPL CREATININE-BSD FRML MDRD: >60 ML/MIN/1.73 M 2
GLUCOSE BLD-MCNC: 259 MG/DL (ref 65–99)
GLUCOSE SERPL-MCNC: 275 MG/DL (ref 65–99)
POTASSIUM SERPL-SCNC: 3.3 MMOL/L (ref 3.6–5.5)
SODIUM SERPL-SCNC: 136 MMOL/L (ref 135–145)

## 2017-05-08 PROCEDURE — 700102 HCHG RX REV CODE 250 W/ 637 OVERRIDE(OP): Performed by: HOSPITALIST

## 2017-05-08 PROCEDURE — A9270 NON-COVERED ITEM OR SERVICE: HCPCS | Performed by: HOSPITALIST

## 2017-05-08 PROCEDURE — 82962 GLUCOSE BLOOD TEST: CPT

## 2017-05-08 PROCEDURE — 700111 HCHG RX REV CODE 636 W/ 250 OVERRIDE (IP): Performed by: HOSPITALIST

## 2017-05-08 PROCEDURE — 80048 BASIC METABOLIC PNL TOTAL CA: CPT

## 2017-05-08 PROCEDURE — 700105 HCHG RX REV CODE 258: Performed by: HOSPITALIST

## 2017-05-08 PROCEDURE — 99239 HOSP IP/OBS DSCHRG MGMT >30: CPT | Performed by: HOSPITALIST

## 2017-05-08 RX ORDER — INSULIN GLARGINE 100 [IU]/ML
32 INJECTION, SOLUTION SUBCUTANEOUS 2 TIMES DAILY
Status: DISCONTINUED | OUTPATIENT
Start: 2017-05-08 | End: 2017-05-08 | Stop reason: HOSPADM

## 2017-05-08 RX ORDER — INSULIN GLARGINE 100 [IU]/ML
32 INJECTION, SOLUTION SUBCUTANEOUS 2 TIMES DAILY
Qty: 10 ML
Start: 2017-05-08 | End: 2017-07-27

## 2017-05-08 RX ORDER — POTASSIUM CHLORIDE 1.5 G/1.58G
40 POWDER, FOR SOLUTION ORAL 2 TIMES DAILY
Status: DISCONTINUED | OUTPATIENT
Start: 2017-05-08 | End: 2017-05-08 | Stop reason: HOSPADM

## 2017-05-08 RX ADMIN — HEPARIN SODIUM 5000 UNITS: 5000 INJECTION, SOLUTION INTRAVENOUS; SUBCUTANEOUS at 00:30

## 2017-05-08 RX ADMIN — POTASSIUM CHLORIDE 40 MEQ: 1.5 POWDER, FOR SOLUTION ORAL at 09:36

## 2017-05-08 RX ADMIN — SODIUM CHLORIDE, POTASSIUM CHLORIDE, SODIUM LACTATE AND CALCIUM CHLORIDE: 600; 310; 30; 20 INJECTION, SOLUTION INTRAVENOUS at 05:38

## 2017-05-08 RX ADMIN — METOCLOPRAMIDE HYDROCHLORIDE 10 MG: 5 SOLUTION ORAL at 08:23

## 2017-05-08 RX ADMIN — INSULIN GLARGINE 30 UNITS: 100 INJECTION, SOLUTION SUBCUTANEOUS at 06:22

## 2017-05-08 RX ADMIN — HEPARIN SODIUM 5000 UNITS: 5000 INJECTION, SOLUTION INTRAVENOUS; SUBCUTANEOUS at 05:38

## 2017-05-08 RX ADMIN — INSULIN LISPRO 23 UNITS: 100 INJECTION, SOLUTION INTRAVENOUS; SUBCUTANEOUS at 08:23

## 2017-05-08 RX ADMIN — VITAMIN D, TAB 1000IU (100/BT) 1000 UNITS: 25 TAB at 08:23

## 2017-05-08 ASSESSMENT — PAIN SCALES - GENERAL: PAINLEVEL_OUTOF10: 0

## 2017-05-08 NOTE — PROGRESS NOTES
Report received from primary nurse, Trace. Assumed care of pt. Pt sitting up in bed. Pt A&Ox4. POC discussed. Pt verbalized understanding. No signs of distress or discomfort noted at this time. Pt instructed to use call light for assistance. Call light and personal belongings within reach. Pt denies any concerns at this time. All questions answered. Safety measures in place. Will continue to monitor.

## 2017-05-08 NOTE — PROGRESS NOTES
Discharging patient home per MD order.  Cab voucher obtained from Shayy STODDARD. Pt demonstrated understanding of discharge instructions, follow up appointments, home medications, prescriptions, home care for surgical wound, and nursing care instructions for DKA.  Ambulating without assistance, voiding without difficulty, pain well controlled, tolerating oral medications, oxygen saturation greater than 90% on RA, tolerating diet. Pt verbalized understanding of discharge instructions and educational handouts, all questions answered.  Pt discharged off unit with hospital escort at 1157.

## 2017-05-08 NOTE — PROGRESS NOTES
Pt's FBS = 259, pt only had a can of diet soda throughout the night aside from water and LR IVF. Pt does Carb count for her humalog dosing.  DEANDRE Buck paged for ordrs, awaiting call back.    0619 DEANDRE Buck called back. Asked to inquire what pt normally does given a similar situation. If pt does not know, give 3units of Humalog once.    Pt states that whenever her FBS is high and a zero carb count, she gets her lantus first and wait to get the humalog together with breakfast. Per MAR, Lantus available for 9am, pt requests to be given early.

## 2017-05-08 NOTE — CARE PLAN
Problem: Safety  Goal: Will remain free from injury  Bed locked and low, controls on.   PT is stable on her feet and ambulates independently.  Anti skid socks on  wctm    Problem: Knowledge Deficit  Goal: Knowledge of disease process/condition, treatment plan, diagnostic tests, and medications will improve  PT updated with POCs, questions answered.   Pt has no complaints and or questions.

## 2017-05-08 NOTE — DISCHARGE INSTRUCTIONS
Discharge Instructions    Discharged to home by car with relative. Discharged via wheelchair, hospital escort: Yes.  Special equipment needed: Not Applicable    Be sure to schedule a follow-up appointment with your primary care doctor or any specialists as instructed.     Discharge Plan:   Influenza Vaccine Indication: Not indicated: Previously immunized this influenza season and > 8 years of age    I understand that a diet low in cholesterol, fat, and sodium is recommended for good health. Unless I have been given specific instructions below for another diet, I accept this instruction as my diet prescription.   Other diet: Diabetic    Special Instructions: None    · Is patient discharged on Warfarin / Coumadin?   No     · Is patient Post Blood Transfusion?  No    Depression / Suicide Risk    As you are discharged from this [facility] facility, it is important to learn how to keep safe from harming yourself.    Recognize the warning signs:  · Abrupt changes in personality, positive or negative- including increase in energy   · Giving away possessions  · Change in eating patterns- significant weight changes-  positive or negative  · Change in sleeping patterns- unable to sleep or sleeping all the time   · Unwillingness or inability to communicate  · Depression  · Unusual sadness, discouragement and loneliness  · Talk of wanting to die  · Neglect of personal appearance   · Rebelliousness- reckless behavior  · Withdrawal from people/activities they love  · Confusion- inability to concentrate     If you or a loved one observes any of these behaviors or has concerns about self-harm, here's what you can do:  · Talk about it- your feelings and reasons for harming yourself  · Remove any means that you might use to hurt yourself (examples: pills, rope, extension cords, firearm)  · Get professional help from the community (Mental Health, Substance Abuse, psychological counseling)  · Do not be alone:Call your Safe Contact-  someone whom you trust who will be there for you.  · Call your local CRISIS HOTLINE 175-2909 or 704-436-2382  · Call your local Children's Mobile Crisis Response Team [place] (159) 820-0010 or www.[employer]  · Call the toll free National Suicide Prevention Hotlines   · National Suicide Prevention Lifeline 601-902-ORQV (5423)  · National Hope Line Network 800-SUICIDE (361-2672)      Diabetic Ketoacidosis  Diabetic ketoacidosis is a life-threatening complication of diabetes. If it is not treated, it can cause severe dehydration and organ damage and can lead to a coma or death.  CAUSES  This condition develops when there is not enough of the hormone insulin in the body. Insulin helps the body to break down sugar for energy. Without insulin, the body cannot break down sugar, so it breaks down fats instead. This leads to the production of acids that are called ketones. Ketones are poisonous at high levels.  This condition can be triggered by:  · Stress on the body that is brought on by an illness.  · Medicines that raise blood glucose levels.  · Not taking diabetes medicine.  SYMPTOMS  Symptoms of this condition include:  · Fatigue.  · Weight loss.  · Excessive thirst.  · Light-headedness.  · Fruity or sweet-smelling breath.  · Excessive urination.  · Vision changes.  · Confusion or irritability.  · Nausea.  · Vomiting.  · Rapid breathing.  · Abdominal pain.  · Feeling flushed.  DIAGNOSIS  This condition is diagnosed based on a medical history, a physical exam, and blood tests. You may also have a urine test that checks for ketones.  TREATMENT  This condition may be treated with:  · Fluid replacement. This may be done to correct dehydration.  · Insulin injections. These may be given through the skin or through an IV tube.  · Electrolyte replacement. Electrolytes, such as potassium and sodium, may be given in pill form or through an IV tube.  · Antibiotic medicines. These may be prescribed if your condition  "was caused by an infection.  HOME CARE INSTRUCTIONS  Eating and Drinking  · Drink enough fluids to keep your urine clear or pale yellow.  · If you cannot eat, alternate between drinking fluids with sugar (such as juice) and salty fluids (such as broth or bouillon).  · If you can eat, follow your usual diet and drink sugar-free liquids, such as water.  Other Instructions  · Take insulin as directed by your health care provider. Do not skip insulin injections. Do not use  insulin.  · If your blood sugar is over 240 mg/dL, monitor your urine ketones every 4-6 hours.  · If you were prescribed an antibiotic medicine, finish all of it even if you start to feel better.  · Rest and exercise only as directed by your health care provider.  · If you get sick, call your health care provider and begin treatment quickly. Your body often needs extra insulin to fight an illness.  · Check your blood glucose levels regularly. If your blood glucose is high, drink plenty of fluids. This helps to flush out ketones.  SEEK MEDICAL CARE IF:  · Your blood glucose level is too high or too low.  · You have ketones in your urine.  · You have a fever.  · You cannot eat.  · You cannot tolerate fluids.  · You have been vomiting for more than 2 hours.  · You continue to have symptoms of this condition.  · You develop new symptoms.  SEEK IMMEDIATE MEDICAL CARE IF:  · Your blood glucose levels continue to be high (elevated).  · Your monitor reads \"high\" even when you are taking insulin.  · You faint.  · You have chest pain.  · You have trouble breathing.  · You have a sudden, severe headache.  · You have sudden weakness in one arm or one leg.  · You have sudden trouble speaking or swallowing.  · You have vomiting or diarrhea that gets worse after 3 hours.  · You feel severely fatigued.  · You have trouble thinking.  · You have abdominal pain.  · You are severely dehydrated. Symptoms of severe dehydration include:  ¨ Extreme thirst.  ¨ Dry " mouth.  ¨ Blue lips.  ¨ Cold hands and feet.  ¨ Rapid breathing.     This information is not intended to replace advice given to you by your health care provider. Make sure you discuss any questions you have with your health care provider.     Document Released: 12/15/2001 Document Revised: 05/03/2016 Document Reviewed: 11/25/2015  [employer] Interactive Patient Education ©2016 [employer]

## 2017-05-08 NOTE — PROGRESS NOTES
Received report from Smyone RICE RN, assumed pt care. Pt resting comfortably in bed. Pt to call RN when completed with breakfast for carb count and humalog administration per MAR. Pt taught to inform nurse if experiencing pain above comfort goal, SOB, chest pain, ambulating out of bed, or for any further assistance. Fall precautions in place, call light within reach. Will continue with care.

## 2017-05-09 NOTE — DISCHARGE SUMMARY
DATE OF ADMISSION:  05/04/2017    DATE OF DISCHARGE:  05/08/2017    PRINCIPAL DIAGNOSES:    1.  Diabetic ketoacidosis.    2.  Abdominal pain likely related to diabetic ketoacidosis.    3.  Hypokalemia.    4.  Type 1 diabetes, uncontrolled.      PRESENTATION:  Please refer to the dictated H and P.      PERTINENT TEST RESULTS ON THIS HOSPITALIZATION:  White blood cell count is   5.5, hemoglobin 12.7, hematocrit 35.9 and platelet count 168.  Sodium 136,   potassium 3.3, chloride 110, bicarbonate 25, glucose is 75, BUN 8, creatinine   0.56 and calcium 7.8.  Glycohemoglobin was 12.3.  Urinalysis on 05/05 revealed   negative nitrites, negative leukocyte esterase, 10-20 white blood cells.    Urine pregnancy test was negative.  Urine culture is negative.  Blood cultures   are negative.  CT of the abdomen and pelvis revealed no acute abdominal   pelvic findings, left pelvic cyst likely ovarian.      HOSPITAL COURSE:  The patient is a 26-year-old female who presented to the   emergency room with abdominal pain, nausea, vomiting and DKA.  She was   admitted to the intensive care unit, started on insulin drip, IV fluids.  Her   electrolytes were repleted.  She had a workup for her abdominal pain on   admission with the CT of the abdomen which was negative for acute pathology.    She gradually clinically improved and was weaned off her insulin drip and   transitioned back to subcutaneous insulin.  I am increasing her Lantus as her   fasting blood sugars remain elevated.  She is otherwise clinically stable for   discharge.  Her potassium has been replaced.  She will be discharged home with   the following instructions.      DIET:  Diabetic.      ACTIVITY:  As tolerated.      DISCHARGE MEDICATIONS:  Lantus 32 units b.i.d., atorvastatin 20 mg daily, iron   sulfate 325 mg daily, Reglan 10 mg 3 times a day, NovoLog sliding scale per   carb counts and blood sugar readings.      The patient will follow up with her primary care provider  and with her   endocrinologist.  She will follow up with _____ at the [facility] on 05/24   and with her endocrinologist Judith Gabriel.      Total time spent preparing for   this discharge was 35 minutes.      Discharge instructions reviewed with the patient, her questions answered.    Reviewed with her the importance to comply with diet and prescribed insulin to   keep a log of her blood sugars, carb counts and sliding scale requirements   and take to her followup appointments.       ____________________________________     MD ANGI HUFFMAN / NTS    DD:  05/08/2017 09:19:12  DT:  05/08/2017 22:30:05    D#:  [id number]  Job#:  [id number]    cc: Judith CARRERA, _____ _____

## 2017-07-27 ENCOUNTER — RESOLUTE PROFESSIONAL BILLING HOSPITAL PROF FEE (OUTPATIENT)
Dept: HOSPITALIST | Facility: MEDICAL CENTER | Age: 27
End: 2017-07-27

## 2017-07-27 ENCOUNTER — APPOINTMENT (OUTPATIENT)
Dept: RADIOLOGY | Facility: MEDICAL CENTER | Age: 27
DRG: 638 | End: 2017-07-27
Attending: EMERGENCY MEDICINE

## 2017-07-27 ENCOUNTER — HOSPITAL ENCOUNTER (INPATIENT)
Facility: MEDICAL CENTER | Age: 27
LOS: 2 days | DRG: 638 | End: 2017-07-29
Attending: EMERGENCY MEDICINE | Admitting: HOSPITALIST

## 2017-07-27 DIAGNOSIS — R10.13 EPIGASTRIC ABDOMINAL PAIN: ICD-10-CM

## 2017-07-27 DIAGNOSIS — E10.10 DIABETIC KETOACIDOSIS WITHOUT COMA ASSOCIATED WITH TYPE 1 DIABETES MELLITUS (HCC): ICD-10-CM

## 2017-07-27 PROBLEM — E87.1 HYPONATREMIA: Status: ACTIVE | Noted: 2017-07-27

## 2017-07-27 PROBLEM — E11.10 DKA (DIABETIC KETOACIDOSES): Status: ACTIVE | Noted: 2017-07-27

## 2017-07-27 PROBLEM — I10 HYPERTENSION: Status: ACTIVE | Noted: 2017-07-27

## 2017-07-27 PROBLEM — E10.9 TYPE 1 DIABETES MELLITUS (HCC): Status: ACTIVE | Noted: 2017-07-27

## 2017-07-27 LAB
ALBUMIN SERPL BCP-MCNC: 3.3 G/DL (ref 3.2–4.9)
ALBUMIN SERPL BCP-MCNC: 4.2 G/DL (ref 3.2–4.9)
ALBUMIN/GLOB SERPL: 1.4 G/DL
ALBUMIN/GLOB SERPL: 1.4 G/DL
ALP SERPL-CCNC: 136 U/L (ref 30–99)
ALP SERPL-CCNC: 181 U/L (ref 30–99)
ALT SERPL-CCNC: 10 U/L (ref 2–50)
ALT SERPL-CCNC: 14 U/L (ref 2–50)
ANION GAP SERPL CALC-SCNC: 10 MMOL/L (ref 0–11.9)
ANION GAP SERPL CALC-SCNC: 16 MMOL/L (ref 0–11.9)
ANION GAP SERPL CALC-SCNC: 4 MMOL/L (ref 0–11.9)
ANION GAP SERPL CALC-SCNC: 8 MMOL/L (ref 0–11.9)
APPEARANCE UR: CLEAR
APPEARANCE UR: CLEAR
AST SERPL-CCNC: 10 U/L (ref 12–45)
AST SERPL-CCNC: 12 U/L (ref 12–45)
B-OH-BUTYR SERPL-MCNC: 4.25 MMOL/L (ref 0.02–0.27)
BACTERIA #/AREA URNS HPF: NEGATIVE /HPF
BASOPHILS # BLD AUTO: 0.5 % (ref 0–1.8)
BASOPHILS # BLD AUTO: 0.8 % (ref 0–1.8)
BASOPHILS # BLD: 0.03 K/UL (ref 0–0.12)
BASOPHILS # BLD: 0.05 K/UL (ref 0–0.12)
BILIRUB SERPL-MCNC: 0.3 MG/DL (ref 0.1–1.5)
BILIRUB SERPL-MCNC: 0.4 MG/DL (ref 0.1–1.5)
BILIRUB UR QL STRIP.AUTO: NEGATIVE
BUN SERPL-MCNC: 13 MG/DL (ref 8–22)
BUN SERPL-MCNC: 15 MG/DL (ref 8–22)
BUN SERPL-MCNC: 16 MG/DL (ref 8–22)
BUN SERPL-MCNC: 20 MG/DL (ref 8–22)
CALCIUM SERPL-MCNC: 5.8 MG/DL (ref 8.5–10.5)
CALCIUM SERPL-MCNC: 7.9 MG/DL (ref 8.5–10.5)
CALCIUM SERPL-MCNC: 8 MG/DL (ref 8.5–10.5)
CALCIUM SERPL-MCNC: 9.7 MG/DL (ref 8.5–10.5)
CHLORIDE SERPL-SCNC: 108 MMOL/L (ref 96–112)
CHLORIDE SERPL-SCNC: 114 MMOL/L (ref 96–112)
CHLORIDE SERPL-SCNC: 117 MMOL/L (ref 96–112)
CHLORIDE SERPL-SCNC: 122 MMOL/L (ref 96–112)
CO2 SERPL-SCNC: 10 MMOL/L (ref 20–33)
CO2 SERPL-SCNC: 12 MMOL/L (ref 20–33)
CO2 SERPL-SCNC: 13 MMOL/L (ref 20–33)
CO2 SERPL-SCNC: 13 MMOL/L (ref 20–33)
COLOR UR AUTO: YELLOW
COLOR UR: YELLOW
CREAT SERPL-MCNC: 0.39 MG/DL (ref 0.5–1.4)
CREAT SERPL-MCNC: 0.53 MG/DL (ref 0.5–1.4)
CREAT SERPL-MCNC: 0.59 MG/DL (ref 0.5–1.4)
CREAT SERPL-MCNC: 0.8 MG/DL (ref 0.5–1.4)
CULTURE IF INDICATED INDCX: NO UA CULTURE
DEPRECATED D DIMER PPP IA-ACNC: <200 NG/ML(D-DU)
EKG IMPRESSION: NORMAL
EOSINOPHIL # BLD AUTO: 0.05 K/UL (ref 0–0.51)
EOSINOPHIL # BLD AUTO: 0.12 K/UL (ref 0–0.51)
EOSINOPHIL NFR BLD: 0.8 % (ref 0–6.9)
EOSINOPHIL NFR BLD: 2 % (ref 0–6.9)
EPI CELLS #/AREA URNS HPF: ABNORMAL /HPF
ERYTHROCYTE [DISTWIDTH] IN BLOOD BY AUTOMATED COUNT: 42.6 FL (ref 35.9–50)
ERYTHROCYTE [DISTWIDTH] IN BLOOD BY AUTOMATED COUNT: 43.3 FL (ref 35.9–50)
EST. AVERAGE GLUCOSE BLD GHB EST-MCNC: 369 MG/DL
GFR SERPL CREATININE-BSD FRML MDRD: >60 ML/MIN/1.73 M 2
GLOBULIN SER CALC-MCNC: 2.3 G/DL (ref 1.9–3.5)
GLOBULIN SER CALC-MCNC: 2.9 G/DL (ref 1.9–3.5)
GLUCOSE BLD-MCNC: 126 MG/DL (ref 65–99)
GLUCOSE BLD-MCNC: 147 MG/DL (ref 65–99)
GLUCOSE BLD-MCNC: 195 MG/DL (ref 65–99)
GLUCOSE BLD-MCNC: 77 MG/DL (ref 65–99)
GLUCOSE SERPL-MCNC: 175 MG/DL (ref 65–99)
GLUCOSE SERPL-MCNC: 289 MG/DL (ref 65–99)
GLUCOSE SERPL-MCNC: 379 MG/DL (ref 65–99)
GLUCOSE SERPL-MCNC: 68 MG/DL (ref 65–99)
GLUCOSE UR QL STRIP.AUTO: 500 MG/DL
GLUCOSE UR STRIP.AUTO-MCNC: >=1000 MG/DL
GRAN CASTS #/AREA URNS LPF: ABNORMAL /LPF
HBA1C MFR BLD: 14.5 % (ref 0–5.6)
HCG SERPL QL: NEGATIVE
HCG UR QL: NEGATIVE
HCT VFR BLD AUTO: 36.6 % (ref 37–47)
HCT VFR BLD AUTO: 42.4 % (ref 37–47)
HGB BLD-MCNC: 12.7 G/DL (ref 12–16)
HGB BLD-MCNC: 14.5 G/DL (ref 12–16)
HYALINE CASTS #/AREA URNS LPF: >10 /LPF
IMM GRANULOCYTES # BLD AUTO: 0.06 K/UL (ref 0–0.11)
IMM GRANULOCYTES # BLD AUTO: 0.09 K/UL (ref 0–0.11)
IMM GRANULOCYTES NFR BLD AUTO: 1 % (ref 0–0.9)
IMM GRANULOCYTES NFR BLD AUTO: 1.4 % (ref 0–0.9)
KETONES UR QL STRIP.AUTO: >=160 MG/DL
KETONES UR STRIP.AUTO-MCNC: >=160 MG/DL
LACTATE BLD-SCNC: 1.1 MMOL/L (ref 0.5–2)
LEUKOCYTE ESTERASE UR QL STRIP.AUTO: NEGATIVE
LEUKOCYTE ESTERASE UR QL STRIP.AUTO: NEGATIVE
LIPASE SERPL-CCNC: 35 U/L (ref 11–82)
LYMPHOCYTES # BLD AUTO: 1.85 K/UL (ref 1–4.8)
LYMPHOCYTES # BLD AUTO: 2.57 K/UL (ref 1–4.8)
LYMPHOCYTES NFR BLD: 27.9 % (ref 22–41)
LYMPHOCYTES NFR BLD: 42.2 % (ref 22–41)
MAGNESIUM SERPL-MCNC: 1.6 MG/DL (ref 1.5–2.5)
MCH RBC QN AUTO: 28.9 PG (ref 27–33)
MCH RBC QN AUTO: 29.6 PG (ref 27–33)
MCHC RBC AUTO-ENTMCNC: 34.2 G/DL (ref 33.6–35)
MCHC RBC AUTO-ENTMCNC: 34.7 G/DL (ref 33.6–35)
MCV RBC AUTO: 84.5 FL (ref 81.4–97.8)
MCV RBC AUTO: 85.3 FL (ref 81.4–97.8)
MICRO URNS: ABNORMAL
MONOCYTES # BLD AUTO: 0.51 K/UL (ref 0–0.85)
MONOCYTES # BLD AUTO: 0.63 K/UL (ref 0–0.85)
MONOCYTES NFR BLD AUTO: 10.3 % (ref 0–13.4)
MONOCYTES NFR BLD AUTO: 7.7 % (ref 0–13.4)
NEUTROPHILS # BLD AUTO: 2.66 K/UL (ref 2–7.15)
NEUTROPHILS # BLD AUTO: 4.09 K/UL (ref 2–7.15)
NEUTROPHILS NFR BLD: 43.7 % (ref 44–72)
NEUTROPHILS NFR BLD: 61.7 % (ref 44–72)
NITRITE UR QL STRIP.AUTO: NEGATIVE
NITRITE UR QL STRIP.AUTO: NEGATIVE
NRBC # BLD AUTO: 0 K/UL
NRBC # BLD AUTO: 0.04 K/UL
NRBC BLD AUTO-RTO: 0 /100 WBC
NRBC BLD AUTO-RTO: 0.7 /100 WBC
PH UR STRIP.AUTO: 5.5 [PH]
PH UR STRIP.AUTO: 6 [PH]
PHOSPHATE SERPL-MCNC: 2.4 MG/DL (ref 2.5–4.5)
PLATELET # BLD AUTO: 206 K/UL (ref 164–446)
PLATELET # BLD AUTO: 239 K/UL (ref 164–446)
PMV BLD AUTO: 10 FL (ref 9–12.9)
PMV BLD AUTO: 10.4 FL (ref 9–12.9)
POTASSIUM SERPL-SCNC: 3 MMOL/L (ref 3.6–5.5)
POTASSIUM SERPL-SCNC: 3.5 MMOL/L (ref 3.6–5.5)
POTASSIUM SERPL-SCNC: 3.8 MMOL/L (ref 3.6–5.5)
POTASSIUM SERPL-SCNC: 4.6 MMOL/L (ref 3.6–5.5)
PROT SERPL-MCNC: 5.6 G/DL (ref 6–8.2)
PROT SERPL-MCNC: 7.1 G/DL (ref 6–8.2)
PROT UR QL STRIP: 100 MG/DL
PROT UR QL STRIP: 100 MG/DL
RBC # BLD AUTO: 4.29 M/UL (ref 4.2–5.4)
RBC # BLD AUTO: 5.02 M/UL (ref 4.2–5.4)
RBC # URNS HPF: ABNORMAL /HPF
RBC UR QL AUTO: ABNORMAL
RBC UR QL AUTO: ABNORMAL
SODIUM SERPL-SCNC: 134 MMOL/L (ref 135–145)
SODIUM SERPL-SCNC: 137 MMOL/L (ref 135–145)
SODIUM SERPL-SCNC: 138 MMOL/L (ref 135–145)
SODIUM SERPL-SCNC: 138 MMOL/L (ref 135–145)
SP GR UR STRIP.AUTO: 1.04
SP GR UR: 1.02
TROPONIN I SERPL-MCNC: <0.01 NG/ML (ref 0–0.04)
UROBILINOGEN UR STRIP.AUTO-MCNC: 0.2 MG/DL
WBC # BLD AUTO: 6.1 K/UL (ref 4.8–10.8)
WBC # BLD AUTO: 6.6 K/UL (ref 4.8–10.8)
WBC #/AREA URNS HPF: ABNORMAL /HPF

## 2017-07-27 PROCEDURE — 84703 CHORIONIC GONADOTROPIN ASSAY: CPT

## 2017-07-27 PROCEDURE — 80053 COMPREHEN METABOLIC PANEL: CPT

## 2017-07-27 PROCEDURE — 700111 HCHG RX REV CODE 636 W/ 250 OVERRIDE (IP): Performed by: HOSPITALIST

## 2017-07-27 PROCEDURE — 81025 URINE PREGNANCY TEST: CPT

## 2017-07-27 PROCEDURE — 96372 THER/PROPH/DIAG INJ SC/IM: CPT

## 2017-07-27 PROCEDURE — 700111 HCHG RX REV CODE 636 W/ 250 OVERRIDE (IP): Performed by: EMERGENCY MEDICINE

## 2017-07-27 PROCEDURE — 83735 ASSAY OF MAGNESIUM: CPT

## 2017-07-27 PROCEDURE — 96375 TX/PRO/DX INJ NEW DRUG ADDON: CPT

## 2017-07-27 PROCEDURE — 99291 CRITICAL CARE FIRST HOUR: CPT

## 2017-07-27 PROCEDURE — A9270 NON-COVERED ITEM OR SERVICE: HCPCS | Performed by: HOSPITALIST

## 2017-07-27 PROCEDURE — 83605 ASSAY OF LACTIC ACID: CPT

## 2017-07-27 PROCEDURE — 99291 CRITICAL CARE FIRST HOUR: CPT | Performed by: HOSPITALIST

## 2017-07-27 PROCEDURE — 700102 HCHG RX REV CODE 250 W/ 637 OVERRIDE(OP): Performed by: HOSPITALIST

## 2017-07-27 PROCEDURE — 700105 HCHG RX REV CODE 258: Performed by: HOSPITALIST

## 2017-07-27 PROCEDURE — 71010 DX-CHEST-PORTABLE (1 VIEW): CPT

## 2017-07-27 PROCEDURE — 83036 HEMOGLOBIN GLYCOSYLATED A1C: CPT

## 2017-07-27 PROCEDURE — 82962 GLUCOSE BLOOD TEST: CPT | Mod: 91

## 2017-07-27 PROCEDURE — 87040 BLOOD CULTURE FOR BACTERIA: CPT | Mod: 91

## 2017-07-27 PROCEDURE — 700102 HCHG RX REV CODE 250 W/ 637 OVERRIDE(OP): Performed by: EMERGENCY MEDICINE

## 2017-07-27 PROCEDURE — 84100 ASSAY OF PHOSPHORUS: CPT

## 2017-07-27 PROCEDURE — 84484 ASSAY OF TROPONIN QUANT: CPT

## 2017-07-27 PROCEDURE — 700102 HCHG RX REV CODE 250 W/ 637 OVERRIDE(OP): Performed by: PHARMACIST

## 2017-07-27 PROCEDURE — 82010 KETONE BODYS QUAN: CPT

## 2017-07-27 PROCEDURE — 80048 BASIC METABOLIC PNL TOTAL CA: CPT

## 2017-07-27 PROCEDURE — 85379 FIBRIN DEGRADATION QUANT: CPT

## 2017-07-27 PROCEDURE — 85025 COMPLETE CBC W/AUTO DIFF WBC: CPT

## 2017-07-27 PROCEDURE — 770022 HCHG ROOM/CARE - ICU (200)

## 2017-07-27 PROCEDURE — 96374 THER/PROPH/DIAG INJ IV PUSH: CPT

## 2017-07-27 PROCEDURE — 700105 HCHG RX REV CODE 258: Performed by: PHARMACIST

## 2017-07-27 PROCEDURE — 81001 URINALYSIS AUTO W/SCOPE: CPT

## 2017-07-27 PROCEDURE — 700105 HCHG RX REV CODE 258: Performed by: EMERGENCY MEDICINE

## 2017-07-27 PROCEDURE — A9270 NON-COVERED ITEM OR SERVICE: HCPCS | Performed by: EMERGENCY MEDICINE

## 2017-07-27 PROCEDURE — 81002 URINALYSIS NONAUTO W/O SCOPE: CPT

## 2017-07-27 PROCEDURE — 83690 ASSAY OF LIPASE: CPT

## 2017-07-27 PROCEDURE — 96361 HYDRATE IV INFUSION ADD-ON: CPT

## 2017-07-27 PROCEDURE — 93005 ELECTROCARDIOGRAM TRACING: CPT | Performed by: EMERGENCY MEDICINE

## 2017-07-27 PROCEDURE — 93005 ELECTROCARDIOGRAM TRACING: CPT

## 2017-07-27 RX ORDER — MAGNESIUM SULFATE HEPTAHYDRATE 40 MG/ML
2 INJECTION, SOLUTION INTRAVENOUS
Status: COMPLETED | OUTPATIENT
Start: 2017-07-27 | End: 2017-07-27

## 2017-07-27 RX ORDER — POLYETHYLENE GLYCOL 3350 17 G/17G
1 POWDER, FOR SOLUTION ORAL
Status: DISCONTINUED | OUTPATIENT
Start: 2017-07-27 | End: 2017-07-29 | Stop reason: HOSPADM

## 2017-07-27 RX ORDER — POTASSIUM CHLORIDE 7.45 MG/ML
10 INJECTION INTRAVENOUS
Status: COMPLETED | OUTPATIENT
Start: 2017-07-27 | End: 2017-07-27

## 2017-07-27 RX ORDER — SODIUM CHLORIDE 9 MG/ML
2000 INJECTION, SOLUTION INTRAVENOUS ONCE
Status: DISCONTINUED | OUTPATIENT
Start: 2017-07-27 | End: 2017-07-28

## 2017-07-27 RX ORDER — FERROUS SULFATE 325(65) MG
325 TABLET ORAL DAILY
Status: DISCONTINUED | OUTPATIENT
Start: 2017-07-28 | End: 2017-07-29 | Stop reason: HOSPADM

## 2017-07-27 RX ORDER — ONDANSETRON 4 MG/1
4 TABLET, ORALLY DISINTEGRATING ORAL EVERY 4 HOURS PRN
Status: DISCONTINUED | OUTPATIENT
Start: 2017-07-27 | End: 2017-07-29 | Stop reason: HOSPADM

## 2017-07-27 RX ORDER — ONDANSETRON 2 MG/ML
4 INJECTION INTRAMUSCULAR; INTRAVENOUS ONCE
Status: COMPLETED | OUTPATIENT
Start: 2017-07-27 | End: 2017-07-27

## 2017-07-27 RX ORDER — SODIUM CHLORIDE 450 MG/100ML
INJECTION, SOLUTION INTRAVENOUS CONTINUOUS
Status: DISCONTINUED | OUTPATIENT
Start: 2017-07-27 | End: 2017-07-28

## 2017-07-27 RX ORDER — DEXTROSE MONOHYDRATE 25 G/50ML
25 INJECTION, SOLUTION INTRAVENOUS
Status: DISCONTINUED | OUTPATIENT
Start: 2017-07-27 | End: 2017-07-28

## 2017-07-27 RX ORDER — ATORVASTATIN CALCIUM 20 MG/1
20 TABLET, FILM COATED ORAL NIGHTLY
Status: DISCONTINUED | OUTPATIENT
Start: 2017-07-27 | End: 2017-07-29 | Stop reason: HOSPADM

## 2017-07-27 RX ORDER — POTASSIUM CHLORIDE 7.45 MG/ML
10 INJECTION INTRAVENOUS
Status: COMPLETED | OUTPATIENT
Start: 2017-07-28 | End: 2017-07-28

## 2017-07-27 RX ORDER — DEXTROSE AND SODIUM CHLORIDE 5; .9 G/100ML; G/100ML
INJECTION, SOLUTION INTRAVENOUS CONTINUOUS
Status: DISCONTINUED | OUTPATIENT
Start: 2017-07-27 | End: 2017-07-28

## 2017-07-27 RX ORDER — DEXTROSE AND SODIUM CHLORIDE 10; .45 G/100ML; G/100ML
INJECTION, SOLUTION INTRAVENOUS CONTINUOUS
Status: DISCONTINUED | OUTPATIENT
Start: 2017-07-27 | End: 2017-07-28

## 2017-07-27 RX ORDER — MAGNESIUM SULFATE HEPTAHYDRATE 40 MG/ML
4 INJECTION, SOLUTION INTRAVENOUS
Status: COMPLETED | OUTPATIENT
Start: 2017-07-27 | End: 2017-07-27

## 2017-07-27 RX ORDER — OXYCODONE HYDROCHLORIDE 5 MG/1
5 TABLET ORAL EVERY 6 HOURS PRN
Status: DISCONTINUED | OUTPATIENT
Start: 2017-07-27 | End: 2017-07-29 | Stop reason: HOSPADM

## 2017-07-27 RX ORDER — MORPHINE SULFATE 4 MG/ML
4 INJECTION, SOLUTION INTRAMUSCULAR; INTRAVENOUS ONCE
Status: COMPLETED | OUTPATIENT
Start: 2017-07-27 | End: 2017-07-27

## 2017-07-27 RX ORDER — ONDANSETRON 2 MG/ML
4 INJECTION INTRAMUSCULAR; INTRAVENOUS EVERY 4 HOURS PRN
Status: DISCONTINUED | OUTPATIENT
Start: 2017-07-27 | End: 2017-07-29 | Stop reason: HOSPADM

## 2017-07-27 RX ORDER — INSULIN GLARGINE 100 [IU]/ML
32 INJECTION, SOLUTION SUBCUTANEOUS 2 TIMES DAILY
Status: DISCONTINUED | OUTPATIENT
Start: 2017-07-28 | End: 2017-07-27

## 2017-07-27 RX ORDER — METOCLOPRAMIDE 10 MG/1
10 TABLET ORAL
Status: DISCONTINUED | OUTPATIENT
Start: 2017-07-27 | End: 2017-07-29 | Stop reason: HOSPADM

## 2017-07-27 RX ORDER — ACETAMINOPHEN 325 MG/1
650 TABLET ORAL EVERY 6 HOURS PRN
Status: DISCONTINUED | OUTPATIENT
Start: 2017-07-27 | End: 2017-07-29 | Stop reason: HOSPADM

## 2017-07-27 RX ORDER — AMOXICILLIN 250 MG
2 CAPSULE ORAL 2 TIMES DAILY
Status: DISCONTINUED | OUTPATIENT
Start: 2017-07-27 | End: 2017-07-29 | Stop reason: HOSPADM

## 2017-07-27 RX ORDER — SODIUM CHLORIDE 9 MG/ML
2000 INJECTION, SOLUTION INTRAVENOUS CONTINUOUS
Status: DISCONTINUED | OUTPATIENT
Start: 2017-07-27 | End: 2017-07-27

## 2017-07-27 RX ORDER — SODIUM CHLORIDE 9 MG/ML
1000 INJECTION, SOLUTION INTRAVENOUS ONCE
Status: COMPLETED | OUTPATIENT
Start: 2017-07-27 | End: 2017-07-27

## 2017-07-27 RX ORDER — BISACODYL 10 MG
10 SUPPOSITORY, RECTAL RECTAL
Status: DISCONTINUED | OUTPATIENT
Start: 2017-07-27 | End: 2017-07-29 | Stop reason: HOSPADM

## 2017-07-27 RX ORDER — SODIUM CHLORIDE 9 MG/ML
INJECTION, SOLUTION INTRAVENOUS CONTINUOUS
Status: DISCONTINUED | OUTPATIENT
Start: 2017-07-27 | End: 2017-07-27

## 2017-07-27 RX ADMIN — DEXTROSE AND SODIUM CHLORIDE: 10; .45 INJECTION, SOLUTION INTRAVENOUS at 16:13

## 2017-07-27 RX ADMIN — SODIUM CHLORIDE 1000 ML: 9 INJECTION, SOLUTION INTRAVENOUS at 11:30

## 2017-07-27 RX ADMIN — POTASSIUM CHLORIDE 10 MEQ: 10 INJECTION, SOLUTION INTRAVENOUS at 17:42

## 2017-07-27 RX ADMIN — MAGNESIUM SULFATE IN WATER 2 G: 40 INJECTION, SOLUTION INTRAVENOUS at 15:59

## 2017-07-27 RX ADMIN — DEXTROSE AND SODIUM CHLORIDE: 5; 900 INJECTION, SOLUTION INTRAVENOUS at 15:16

## 2017-07-27 RX ADMIN — POTASSIUM CHLORIDE 10 MEQ: 10 INJECTION, SOLUTION INTRAVENOUS at 15:58

## 2017-07-27 RX ADMIN — ONDANSETRON 4 MG: 2 INJECTION INTRAMUSCULAR; INTRAVENOUS at 11:30

## 2017-07-27 RX ADMIN — MORPHINE SULFATE 4 MG: 4 INJECTION INTRAVENOUS at 11:30

## 2017-07-27 RX ADMIN — LIDOCAINE HYDROCHLORIDE 30 ML: 20 SOLUTION OROPHARYNGEAL at 12:13

## 2017-07-27 RX ADMIN — INSULIN HUMAN 5 UNITS: 100 INJECTION, SOLUTION PARENTERAL at 12:44

## 2017-07-27 RX ADMIN — SODIUM CHLORIDE 2000 ML: 9 INJECTION, SOLUTION INTRAVENOUS at 12:44

## 2017-07-27 RX ADMIN — POTASSIUM CHLORIDE 10 MEQ: 10 INJECTION, SOLUTION INTRAVENOUS at 18:00

## 2017-07-27 RX ADMIN — POTASSIUM CHLORIDE 10 MEQ: 10 INJECTION, SOLUTION INTRAVENOUS at 19:17

## 2017-07-27 RX ADMIN — METOCLOPRAMIDE HYDROCHLORIDE 10 MG: 10 TABLET ORAL at 18:29

## 2017-07-27 RX ADMIN — SODIUM CHLORIDE 3 UNITS/HR: 9 INJECTION, SOLUTION INTRAVENOUS at 15:17

## 2017-07-27 RX ADMIN — OXYCODONE HYDROCHLORIDE 5 MG: 5 TABLET ORAL at 16:30

## 2017-07-27 RX ADMIN — ATORVASTATIN CALCIUM 20 MG: 20 TABLET, FILM COATED ORAL at 21:06

## 2017-07-27 ASSESSMENT — LIFESTYLE VARIABLES: DO YOU DRINK ALCOHOL: NO

## 2017-07-27 ASSESSMENT — PAIN SCALES - GENERAL
PAINLEVEL_OUTOF10: 8
PAINLEVEL_OUTOF10: 3
PAINLEVEL_OUTOF10: 8
PAINLEVEL_OUTOF10: 1
PAINLEVEL_OUTOF10: 8
PAINLEVEL_OUTOF10: 7
PAINLEVEL_OUTOF10: 1
PAINLEVEL_OUTOF10: 7

## 2017-07-27 NOTE — ED NOTES
"Pt. Ambulates to room without assistance. Pt. Describes chest pain as pressure in chest with some \"upper back discomfort\". aao x3. Respirations even and unlabored  "

## 2017-07-27 NOTE — ED PROVIDER NOTES
ED Provider Note    Scribed for Nikki Cano M.D. by Thom Burkett. 7/27/2017  11:04 AM    Primary care provider: Navi Huber M.D.  Means of arrival: Walk-in  History obtained from: Patient  History limited by: None    CHIEF COMPLAINT  Chief Complaint   Patient presents with   • Chest Pain   • Shortness of Breath       HPI  Alis Sparks is a 26 y.o. female who presents to the Emergency Department chest pain and shortness of breath onset this morning after waking up. The patient reports associated bilateral flank pain, abominal pain, middle back pain, fever, and dry cough. She denies any dysuria, diarrhea, vomiting, lower extremity swelling,  numbness, or tingling. The patient's blood glucose was 330 this morning, but it was normal yesterday evening. She is compliant with her insulin. The patient reports a history of similar symptoms, which were caused by a UTI. She denies a history of nephrolithiasis. The patient repots that she had a chest tube placed three years ago after her lung was punctured during a central line placement. She does not smoke cigarettes or drink alcohol. The patient reports that there is no chance that she is pregnant because she is not sexually active.    REVIEW OF SYSTEMS  CARDIAC: Positive for chest pain.  PULMONARY: Positive for shortness of breath and dry cough.  GI: Positive for abdominal pain. No diarrhea or vomiting.  : no dysuria  Neuro: no numbness or tingling.  Musculoskeletal: Positive for bilateral flank pain and middle back pain.  Endocrine: Positive for fever. No lower extremity edema    See history of present illness. All other systems are negative  C    PAST MEDICAL HISTORY   has a past medical history of Diabetes type 1, controlled (CMS-[facility]); DKA (diabetic ketoacidoses) (CMS-[facility]); UTI (urinary tract infection); Kidney infection; Pneumonia; Backpain; Bronchitis; Fall; and Gastroparesis.    SURGICAL HISTORY   has past surgical history that includes  "gastroscopy-endo (9/18/2014); gastroscopy with biopsy (9/18/2014); and other.    SOCIAL HISTORY  Social History   Substance Use Topics   • Smoking status: Never Smoker    • Smokeless tobacco: Never Used   • Alcohol Use: No      History   Drug Use No       FAMILY HISTORY  Family History   Problem Relation Age of Onset   • Cancer       breasts, multiple family members       CURRENT MEDICATIONS  Home Medications     Reviewed by Blade Darden (Pharmacy Tech) on 07/27/17 at 1329  Med List Status: Complete    Medication Last Dose Status    atorvastatin (LIPITOR) 20 MG Tab 7/26/2017 Active    Cholecalciferol (VITAMIN D PO) 7/27/2017 Active    ferrous sulfate 325 (65 FE) MG tablet 7/27/2017 Active    insulin aspart (NOVOLOG) 100 UNIT/ML Solution 7/27/2017 Active    insulin detemir (LEVEMIR FLEXPEN) 100 UNIT/ML Solution Pen-injector injection 7/27/2017 Active    metoclopramide (REGLAN) 5 MG/5ML Solution 7/27/2017 Active                ALLERGIES  Allergies   Allergen Reactions   • Pcn [Penicillins] Shortness of Breath and Swelling     Per patient's Mom, patient tolerates Keflex   • Promethazine Vomiting   • Lisinopril      Per historical: Reported pedal swelling. No facial/angioedema or rash nor respiratory symptoms.        PHYSICAL EXAM  VITAL SIGNS: /74 mmHg  Pulse 85  Temp(Src) 36.1 °C (97 °F)  Resp 15  Ht 1.651 m (5' 5\")  Wt 76 kg (167 lb 8.8 oz)  BMI 27.88 kg/m2  SpO2 97%    Constitutional: Well developed, Well nourished, No acute distress, Non-toxic appearance.   HEENT: Normocephalic, Atraumatic,  external ears normal, pharynx pink,  Mucous  Membranes moist, No rhinorrhea or mucosal edema  Eyes: PERRL, EOMI, Conjunctiva normal, No discharge.   Neck: Normal range of motion, No tenderness, Supple, No stridor.   Lymphatic: No lymphadenopathy    Cardiovascular: Tachycardia, Regular Rhythm, No murmurs,  rubs, or gallops.   Thorax & Lungs: Lungs clear to auscultation bilaterally, No respiratory " distress, No wheezes, rhales or rhonchi, No chest wall tenderness.   Abdomen: Bowel sounds normal, Soft, Epigastric pain, Left upper quadrant tenderness, suprapubic tenderness. non distended,  No pulsatile masses, no rebound guarding or peritoneal signs.   Skin: Warm, Dry, No erythema, No rash,   Back: CVA tenderness,  No spinal tenderness, bony crepitance, step offs, or instability.   Neurologic: Alert & oriented x 3, Normal motor function, Normal sensory function, No focal deficits noted. Normal reflexes. Normal Cranial Nerves.  Extremities: Equal, intact distal pulses, No cyanosis, clubbing or edema,  No tenderness.   Musculoskeletal: Good range of motion in all major joints. No tenderness to palpation or major deformities noted. No Prabha's sign.  Contusion to dorsum of left foot and ankle. No prabha's sign    DIAGNOSTIC STUDIES / PROCEDURES    LABS  Results for orders placed or performed during the hospital encounter of 07/27/17   CBC WITH DIFFERENTIAL   Result Value Ref Range    WBC 6.6 4.8 - 10.8 K/uL    RBC 5.02 4.20 - 5.40 M/uL    Hemoglobin 14.5 12.0 - 16.0 g/dL    Hematocrit 42.4 37.0 - 47.0 %    MCV 84.5 81.4 - 97.8 fL    MCH 28.9 27.0 - 33.0 pg    MCHC 34.2 33.6 - 35.0 g/dL    RDW 42.6 35.9 - 50.0 fL    Platelet Count 239 164 - 446 K/uL    MPV 10.4 9.0 - 12.9 fL    Neutrophils-Polys 61.70 44.00 - 72.00 %    Lymphocytes 27.90 22.00 - 41.00 %    Monocytes 7.70 0.00 - 13.40 %    Eosinophils 0.80 0.00 - 6.90 %    Basophils 0.50 0.00 - 1.80 %    Immature Granulocytes 1.40 (H) 0.00 - 0.90 %    Nucleated RBC 0.00 /100 WBC    Neutrophils (Absolute) 4.09 2.00 - 7.15 K/uL    Lymphs (Absolute) 1.85 1.00 - 4.80 K/uL    Monos (Absolute) 0.51 0.00 - 0.85 K/uL    Eos (Absolute) 0.05 0.00 - 0.51 K/uL    Baso (Absolute) 0.03 0.00 - 0.12 K/uL    Immature Granulocytes (abs) 0.09 0.00 - 0.11 K/uL    NRBC (Absolute) 0.00 K/uL   COMP METABOLIC PANEL   Result Value Ref Range    Sodium 134 (L) 135 - 145 mmol/L    Potassium 3.8  3.6 - 5.5 mmol/L    Chloride 108 96 - 112 mmol/L    Co2 10 (L) 20 - 33 mmol/L    Anion Gap 16.0 (H) 0.0 - 11.9    Glucose 379 (H) 65 - 99 mg/dL    Bun 20 8 - 22 mg/dL    Creatinine 0.80 0.50 - 1.40 mg/dL    Calcium 9.7 8.5 - 10.5 mg/dL    AST(SGOT) 12 12 - 45 U/L    ALT(SGPT) 14 2 - 50 U/L    Alkaline Phosphatase 181 (H) 30 - 99 U/L    Total Bilirubin 0.4 0.1 - 1.5 mg/dL    Albumin 4.2 3.2 - 4.9 g/dL    Total Protein 7.1 6.0 - 8.2 g/dL    Globulin 2.9 1.9 - 3.5 g/dL    A-G Ratio 1.4 g/dL   LIPASE   Result Value Ref Range    Lipase 35 11 - 82 U/L   TROPONIN   Result Value Ref Range    Troponin I <0.01 0.00 - 0.04 ng/mL   URINALYSIS CULTURE, IF INDICATED   Result Value Ref Range    Color Yellow     Character Clear     Specific Gravity 1.036 <1.035    Ph 5.5 5.0-8.0    Glucose >=1000 (A) Negative mg/dL    Ketones >=160 Negative mg/dL    Protein 100 (A) Negative mg/dL    Bilirubin Negative Negative    Urobilinogen, Urine 0.2 Negative    Nitrite Negative Negative    Leukocyte Esterase Negative Negative    Occult Blood Trace (A) Negative    Micro Urine Req Microscopic     Culture Indicated No UA Culture   LACTIC ACID   Result Value Ref Range    Lactic Acid 1.1 0.5 - 2.0 mmol/L   HCG QUAL SERUM   Result Value Ref Range    Beta-Hcg Qualitative Serum Negative Negative   D-DIMER   Result Value Ref Range    D-Dimer Screen <200 <250 ng/mL(D-DU)   BETA-HYDROXYBUTYRIC ACID   Result Value Ref Range    beta-Hydroxybutyric Acid 4.25 (H) 0.02 - 0.27 mmol/L   URINE MICROSCOPIC (W/UA)   Result Value Ref Range    WBC 0-2 /hpf    RBC 5-10 (A) /hpf    Bacteria Negative None /hpf    Epithelial Cells Few /hpf    Hyaline Cast >10 (A) /lpf    Granular Casts 3-5 (A) /lpf   ESTIMATED GFR   Result Value Ref Range    GFR If African American >60 >60 mL/min/1.73 m 2    GFR If Non African American >60 >60 mL/min/1.73 m 2   CBC with Differential   Result Value Ref Range    WBC 6.1 4.8 - 10.8 K/uL    RBC 4.29 4.20 - 5.40 M/uL    Hemoglobin 12.7 12.0 -  16.0 g/dL    Hematocrit 36.6 (L) 37.0 - 47.0 %    MCV 85.3 81.4 - 97.8 fL    MCH 29.6 27.0 - 33.0 pg    MCHC 34.7 33.6 - 35.0 g/dL    RDW 43.3 35.9 - 50.0 fL    Platelet Count 206 164 - 446 K/uL    MPV 10.0 9.0 - 12.9 fL    Neutrophils-Polys 43.70 (L) 44.00 - 72.00 %    Lymphocytes 42.20 (H) 22.00 - 41.00 %    Monocytes 10.30 0.00 - 13.40 %    Eosinophils 2.00 0.00 - 6.90 %    Basophils 0.80 0.00 - 1.80 %    Immature Granulocytes 1.00 (H) 0.00 - 0.90 %    Nucleated RBC 0.70 /100 WBC    Neutrophils (Absolute) 2.66 2.00 - 7.15 K/uL    Lymphs (Absolute) 2.57 1.00 - 4.80 K/uL    Monos (Absolute) 0.63 0.00 - 0.85 K/uL    Eos (Absolute) 0.12 0.00 - 0.51 K/uL    Baso (Absolute) 0.05 0.00 - 0.12 K/uL    Immature Granulocytes (abs) 0.06 0.00 - 0.11 K/uL    NRBC (Absolute) 0.04 K/uL   Comp Metabolic Panel with Phosphorus, Magnesium   Result Value Ref Range    Sodium 137 135 - 145 mmol/L    Potassium 3.5 (L) 3.6 - 5.5 mmol/L    Chloride 114 (H) 96 - 112 mmol/L    Co2 13 (L) 20 - 33 mmol/L    Anion Gap 10.0 0.0 - 11.9    Glucose 175 (H) 65 - 99 mg/dL    Bun 16 8 - 22 mg/dL    Creatinine 0.59 0.50 - 1.40 mg/dL    Calcium 8.0 (L) 8.5 - 10.5 mg/dL    AST(SGOT) 10 (L) 12 - 45 U/L    ALT(SGPT) 10 2 - 50 U/L    Alkaline Phosphatase 136 (H) 30 - 99 U/L    Total Bilirubin 0.3 0.1 - 1.5 mg/dL    Albumin 3.3 3.2 - 4.9 g/dL    Total Protein 5.6 (L) 6.0 - 8.2 g/dL    Globulin 2.3 1.9 - 3.5 g/dL    A-G Ratio 1.4 g/dL   MAGNESIUM   Result Value Ref Range    Magnesium 1.6 1.5 - 2.5 mg/dL   PHOSPHORUS   Result Value Ref Range    Phosphorus 2.4 (L) 2.5 - 4.5 mg/dL   HEMOGLOBIN A1C   Result Value Ref Range    Glycohemoglobin 14.5 (H) 0.0 - 5.6 %    Est Avg Glucose 369 mg/dL   ESTIMATED GFR   Result Value Ref Range    GFR If African American >60 >60 mL/min/1.73 m 2    GFR If Non African American >60 >60 mL/min/1.73 m 2   POC UA   Result Value Ref Range    POC Color Yellow     POC Appearance Clear     POC Glucose 500 (A) Negative mg/dL    POC  Ketones >=160 (A) Negative mg/dL    POC Specific Gravity 1.020 1.005-1.030    POC Blood Trace-lysed (A) Negative    POC Urine PH 6.0 5.0-8.0    POC Protein 100 (A) Negative mg/dL    POC Nitrites Negative Negative    POC Leukocyte Esterase Negative Negative   POC URINE PREGNANCY   Result Value Ref Range    POC Urine Pregnancy Test Negative Negative   EKG (NOW)   Result Value Ref Range    Report       [facility].    Test Date:  2017  Pt Name:    AMANDA BRANCH            Department: ER  MRN:        [id number]                      Room:  Gender:     F                            Technician: [id number]  :        [id number]                   Requested By:ER TRIAGE PROTOCOL  Order #:    [id number]                    Reading MD:    Measurements  Intervals                                Axis  Rate:       112                          P:          46  WV:         140                          QRS:        37  QRSD:       62                           T:          65  QT:         304  QTc:        415    Interpretive Statements  SINUS TACHYCARDIA  Compared to ECG 2015 03:30:42  T-wave abnormality no longer present       All labs reviewed by me.    EKG  12 lead EKG; Interpreted by emergency department physician    Rhythm: Sinus tachycardia  Rate: 112  Axis: Normal  R Wave: Normal R wave progression  Normal ST-T segments  ST Segments: No ST segment elevation   T waves: Normal  Q waves: None    Clinical Impression: No acute changes. Improved from EKG taken 2015    RADIOLOGY  DX-CHEST-PORTABLE (1 VIEW)   Final Result      No consolidation.        The radiologist's interpretation of all radiological studies have been reviewed by me.    COURSE & MEDICAL DECISION MAKING  Nursing notes, VS, PMSFHx reviewed in chart.    11:04 AM - Patient seen and examined at bedside. Patient will be treated with Zofran injection 4 mg, morphine injection 4 mg, and NS infusion 1 L to treat for dehydration.  Ordered DX chest, POC U/A, lactic acid, venous blood gas, HCG qual seru to evaluate her symptoms. The differential diagnoses include but are not limited to: DKA vs nephrolithiasis vs pyelonephritis     11:30 AM Review of laboratory results reveal and elevated glucose.    11:55 AM I ordered POC urine pregnancy to evaluate.    12:07 PM The patient reports a history of gastroparesis and stomach infection, but denies a history of stomach ulcers. I informed her of her ED test results and told her that I would order a GI cocktail oral suspension 30 ml for her pain. She verbalizes understanding and agreement.    12:32 PM Review of laboratory results indicate diabetic ketoacidosis.    12:35 PM I discussed the patient's case and the above findings with Dr. Douglass (Hospitalist,) who agrees to transfer care of the patient.    12:35 PM I discussed the patient's case and further treatment with Pharmacy.    12:37 PM I ordered NS infusion 2 L  And Humulin R injection 5 units to treat for diabetic ketoacidosis.    DISPOSITION:  Patient will be admitted to Dr. Douglass, Hospitalist, in guarded condition.    FINAL IMPRESSION  1. Diabetic ketoacidosis without coma associated with type 1 diabetes mellitus (CMS-H[facility])    2. Epigastric abdominal pain          Thom BRAMBILA (Scribe), am scribing for, and in the presence of, Nikki Cano M.D..    Electronically signed by: Thom Burkett (Scribe), 7/27/2017    Nikki BRAMBILA M.D. personally performed the services described in this documentation, as scribed by Thom Burkett in my presence, and it is both accurate and complete.    The note accurately reflects work and decisions made by me.  Nikki Cano  7/27/2017  5:30 PM

## 2017-07-27 NOTE — IP AVS SNAPSHOT
Toywheel Access Code: [email or web address]  Expires: 8/11/2017  4:18 AM    Toywheel  A secure, online tool to manage your health information     [employer]’s Toywheel® is a secure, online tool that connects you to your personalized health information from the privacy of your home -- day or night - making it very easy for you to manage your healthcare. Once the activation process is completed, you can even access your medical information using the Toywheel leyda, which is available for free in the Apple Leyda store or Google Play store.     Toywheel provides the following levels of access (as shown below):   My Chart Features   [facility] Primary Care Doctor [facility]  Specialists [facility]  Urgent  Care Non-[facility]  Primary Care  Doctor   Email your healthcare team securely and privately 24/7 X X X X   Manage appointments: schedule your next appointment; view details of past/upcoming appointments X      Request prescription refills. X      View recent personal medical records, including lab and immunizations X X X X   View health record, including health history, allergies, medications X X X X   Read reports about your outpatient visits, procedures, consult and ER notes X X X X   See your discharge summary, which is a recap of your hospital and/or ER visit that includes your diagnosis, lab results, and care plan. X X       How to register for Toywheel:  1. Go to  [email or web address].  2. Click on the Sign Up Now box, which takes you to the New Member Sign Up page. You will need to provide the following information:  a. Enter your Toywheel Access Code exactly as it appears at the top of this page. (You will not need to use this code after you’ve completed the sign-up process. If you do not sign up before the expiration date, you must request a new code.)   b. Enter your date of birth.   c. Enter your home email address.   d. Click Submit, and follow the next screen’s instructions.  3. Create a Toywheel ID. This will be your Toywheel  login ID and cannot be changed, so think of one that is secure and easy to remember.  4. Create a [employer] password. You can change your password at any time.  5. Enter your Password Reset Question and Answer. This can be used at a later time if you forget your password.   6. Enter your e-mail address. This allows you to receive e-mail notifications when new information is available in [employer].  7. Click Sign Up. You can now view your health information.    For assistance activating your [employer] account, call [phone]

## 2017-07-27 NOTE — IP AVS SNAPSHOT
7/29/2017    Alis Sparks  [address]  [place] [address]    Dear Alis:    [facility] wants to ensure your discharge home is safe and you or your loved ones have had all of your questions answered regarding your care after you leave the hospital.    Below is a list of resources and contact information should you have any questions regarding your hospital stay, follow-up instructions, or active medical symptoms.    Questions or Concerns Regarding… Contact   Medical Questions Related to Your Discharge  (7 days a week, 8am-5pm) Contact a Nurse Care Coordinator   [phone]   Medical Questions Not Related to Your Discharge  (24 hours a day / 7 days a week)  Contact the Nurse Health Line   [phone]    Medications or Discharge Instructions Refer to your discharge packet   or contact your [facility] Primary Care Provider   [phone]   Follow-up Appointment(s) Schedule your appointment via [employer]   or contact Scheduling [phone]   Billing Review your statement via [employer]  or contact Billing [phone]   Medical Records Review your records via [employer]   or contact Medical Records [phone]     You may receive a telephone call within two days of discharge. This call is to make certain you understand your discharge instructions and have the opportunity to have any questions answered. You can also easily access your medical information, test results and upcoming appointments via the [employer] free online health management tool. You can learn more and sign up at [employer]. For assistance setting up your [employer] account, please call [phone].    Once again, we want to ensure your discharge home is safe and that you have a clear understanding of any next steps in your care. If you have any questions or concerns, please do not hesitate to contact us, we are here for you. Thank you for choosing [facility] for your healthcare needs.    Sincerely,    Your [facility] Healthcare Team

## 2017-07-27 NOTE — IP AVS SNAPSHOT
" Home Care Instructions                                                                                                                  Name:Alis Sparks  Medical Record Number:[id number]  CSN: [id number]    YOB: 1990   Age: 26 y.o.  Sex: female  HT:1.651 m (5' 5\") WT: 75.8 kg (167 lb 1.7 oz)          Admit Date: 7/27/2017     Discharge Date:   Today's Date: 7/29/2017  Attending Doctor:  Pa Urbina M.D.                  Allergies:  Pcn; Promethazine; and Lisinopril            Discharge Instructions       Discharge Instructions    Discharged to home by car with relative. Discharged via walking, hospital escort: Yes.  Special equipment needed: Not Applicable    Be sure to schedule a follow-up appointment with your primary care doctor or any specialists as instructed.     Discharge Plan:        I understand that a diet low in cholesterol, fat, and sodium is recommended for good health. Unless I have been given specific instructions below for another diet, I accept this instruction as my diet prescription.   Other diet: Diabetic Diet    Special Instructions: None    · Is patient discharged on Warfarin / Coumadin?   No     · Is patient Post Blood Transfusion?  No    Depression / Suicide Risk    As you are discharged from this [facility] facility, it is important to learn how to keep safe from harming yourself.    Recognize the warning signs:  · Abrupt changes in personality, positive or negative- including increase in energy   · Giving away possessions  · Change in eating patterns- significant weight changes-  positive or negative  · Change in sleeping patterns- unable to sleep or sleeping all the time   · Unwillingness or inability to communicate  · Depression  · Unusual sadness, discouragement and loneliness  · Talk of wanting to die  · Neglect of personal appearance   · Rebelliousness- reckless behavior  · Withdrawal from people/activities they love  · Confusion- inability to " concentrate     If you or a loved one observes any of these behaviors or has concerns about self-harm, here's what you can do:  · Talk about it- your feelings and reasons for harming yourself  · Remove any means that you might use to hurt yourself (examples: pills, rope, extension cords, firearm)  · Get professional help from the community (Mental Health, Substance Abuse, psychological counseling)  · Do not be alone:Call your Safe Contact- someone whom you trust who will be there for you.  · Call your local CRISIS HOTLINE 461-0831 or 870-906-4536  · Call your local Children's Mobile Crisis Response Team [place] (583) 383-3786 or www.sliceX  · Call the toll free National Suicide Prevention Hotlines   · National Suicide Prevention Lifeline 340-923-WBEA (3473)  · [employer] Line Network 800-SUICIDE (344-3942)        Diabetic Ketoacidosis  Diabetic ketoacidosis is a life-threatening complication of diabetes. If it is not treated, it can cause severe dehydration and organ damage and can lead to a coma or death.  CAUSES  This condition develops when there is not enough of the hormone insulin in the body. Insulin helps the body to break down sugar for energy. Without insulin, the body cannot break down sugar, so it breaks down fats instead. This leads to the production of acids that are called ketones. Ketones are poisonous at high levels.  This condition can be triggered by:  · Stress on the body that is brought on by an illness.  · Medicines that raise blood glucose levels.  · Not taking diabetes medicine.  SYMPTOMS  Symptoms of this condition include:  2. Fatigue.  3. Weight loss.  4. Excessive thirst.  5. Light-headedness.  6. Fruity or sweet-smelling breath.  7. Excessive urination.  8. Vision changes.  9. Confusion or irritability.  10. Nausea.  11. Vomiting.  12. Rapid breathing.  13. Abdominal pain.  14. Feeling flushed.  DIAGNOSIS  This condition is diagnosed based on a medical history, a  "physical exam, and blood tests. You may also have a urine test that checks for ketones.  TREATMENT  This condition may be treated with:  2. Fluid replacement. This may be done to correct dehydration.  3. Insulin injections. These may be given through the skin or through an IV tube.  4. Electrolyte replacement. Electrolytes, such as potassium and sodium, may be given in pill form or through an IV tube.  5. Antibiotic medicines. These may be prescribed if your condition was caused by an infection.  HOME CARE INSTRUCTIONS  Eating and Drinking  2. Drink enough fluids to keep your urine clear or pale yellow.  3. If you cannot eat, alternate between drinking fluids with sugar (such as juice) and salty fluids (such as broth or bouillon).  4. If you can eat, follow your usual diet and drink sugar-free liquids, such as water.  Other Instructions  2. Take insulin as directed by your health care provider. Do not skip insulin injections. Do not use  insulin.  3. If your blood sugar is over 240 mg/dL, monitor your urine ketones every 4-6 hours.  4. If you were prescribed an antibiotic medicine, finish all of it even if you start to feel better.  5. Rest and exercise only as directed by your health care provider.  6. If you get sick, call your health care provider and begin treatment quickly. Your body often needs extra insulin to fight an illness.  7. Check your blood glucose levels regularly. If your blood glucose is high, drink plenty of fluids. This helps to flush out ketones.  SEEK MEDICAL CARE IF:  · Your blood glucose level is too high or too low.  · You have ketones in your urine.  · You have a fever.  · You cannot eat.  · You cannot tolerate fluids.  · You have been vomiting for more than 2 hours.  · You continue to have symptoms of this condition.  · You develop new symptoms.  SEEK IMMEDIATE MEDICAL CARE IF:  · Your blood glucose levels continue to be high (elevated).  · Your monitor reads \"high\" even when you " are taking insulin.  · You faint.  · You have chest pain.  · You have trouble breathing.  · You have a sudden, severe headache.  · You have sudden weakness in one arm or one leg.  · You have sudden trouble speaking or swallowing.  · You have vomiting or diarrhea that gets worse after 3 hours.  · You feel severely fatigued.  · You have trouble thinking.  · You have abdominal pain.  · You are severely dehydrated. Symptoms of severe dehydration include:  ¨ Extreme thirst.  ¨ Dry mouth.  ¨ Blue lips.  ¨ Cold hands and feet.  ¨ Rapid breathing.     This information is not intended to replace advice given to you by your health care provider. Make sure you discuss any questions you have with your health care provider.     Document Released: 12/15/2001 Document Revised: 05/03/2016 Document Reviewed: 11/25/2015  [employer] Interactive Patient Education ©2016 [employer]        Follow-up Information     1. Follow up with Navi Huber M.D.. Schedule an appointment as soon as possible for a visit in 1 week.    Specialty:  Family Medicine    Why:   is unable to contact office; closed for the weekend.    Contact information    [address] [place] [address]  [phone]          2. Follow up with Navi Huber M.D..    Specialty:  Family Medicine    Contact information    [address] [place] [address]  [phone]           Discharge Medication Instructions:    Below are the medications your physician expects you to take upon discharge:    Review all your home medications and newly ordered medications with your doctor and/or pharmacist. Follow medication instructions as directed by your doctor and/or pharmacist.    Please keep your medication list with you and share with your physician.               Medication List      CONTINUE taking these medications        Instructions    Morning Afternoon Evening Bedtime    atorvastatin 20 MG Tabs   Last time this was given:  20 mg on 7/28/2017  8:01 PM    Commonly known as:  LIPITOR   Next Dose Due:  7/29/2017 in PM.        Take 20 mg by mouth every evening.   Dose:  20 mg                        ferrous sulfate 325 (65 FE) MG tablet   Last time this was given:  325 mg on 7/29/2017  8:35 AM   Next Dose Due:  7/30/2017 in AM.        Take 325 mg by mouth every day.   Dose:  325 mg                        LEVEMIR FLEXPEN 100 UNIT/ML Sopn injection   Generic drug:  insulin detemir   Next Dose Due:  7/29/2017 in PM.        Inject 30 Units as instructed 2 times a day.   Dose:  30 Units                        metoclopramide 5 MG/5ML Soln   Commonly known as:  REGLAN   Next Dose Due:  7/29/2016 at 6PM        Take 10 mg by mouth 3 times a day, with meals.   Dose:  10 mg                        NOVOLOG 100 UNIT/ML Soln   Generic drug:  insulin aspart   Next Dose Due:  7/29/2017 at 5PM.        Inject 1-3 Units as instructed 3 times a day before meals. 1 unit for every 3 grams of carbohydrate 1 unit for every 50 mg/dL > 150 mg/dL   Dose:  1-3 Units                        VITAMIN D PO   Next Dose Due:  7/30/2017        Take 1 Cap by mouth every day.   Dose:  1 Cap                                Instructions           Diet / Nutrition:    Follow any diet instructions given to you by your doctor or the dietician, including how much salt (sodium) you are allowed each day.    If you are overweight, talk to your doctor about a weight reduction plan.    Activity:    Remain physically active following your doctor's instructions about exercise and activity.    Rest often.     Any time you become even a little tired or short of breath, SIT DOWN and rest.    Worsening Symptoms:    Report any of the following signs and symptoms to the doctor's office immediately:    *Pain of jaw, arm, or neck  *Chest pain not relieved by medication                               *Dizziness or loss of consciousness  *Difficulty breathing even when at rest   *More tired than usual                                        *Bleeding drainage or swelling of surgical site  *Swelling of feet, ankles, legs or stomach                 *Fever (>100ºF)  *Pink or blood tinged sputum  *Weight gain (3lbs/day or 5lbs /week)           *Shock from internal defibrillator (if applicable)  *Palpitations or irregular heartbeats                *Cool and/or numb extremities    Stroke Awareness    Common Risk Factors for Stroke include:    Age  Atrial Fibrillation  Carotid Artery Stenosis  Diabetes Mellitus  Excessive alcohol consumption  High blood pressure  Overweight   Physical inactivity  Smoking    Warning signs and symptoms of a stroke include:    *Sudden numbness or weakness of the face, arm or leg (especially on one side of the body).  *Sudden confusion, trouble speaking or understanding.  *Sudden trouble seeing in one or both eyes.  *Sudden trouble walking, dizziness, loss of balance or coordination.Sudden severe headache with no known cause.    It is very important to get treatment quickly when a stroke occurs. If you experience any of the above warning signs, call 911 immediately.                   Disclaimer         Quit Smoking / Tobacco Use:    I understand the use of any tobacco products increases my chance of suffering from future heart disease or stroke and could cause other illnesses which may shorten my life. Quitting the use of tobacco products is the single most important thing I can do to improve my health. For further information on smoking / tobacco cessation call a Toll Free Quit Line at 1-273.760.8133 (*National Cancer [place]) or 1-567.706.5220 (American Lung Association) or you can access the web based program at www.lungusa.org.    [place] Tobacco Users Help Line:  (195) 284-2990       Toll Free: 1-547.681.9283  Quit Tobacco Program [facility] (303)294-4600    Crisis Hotline:    [place] Crisis Hotline:  3-152-JBDZKVD or 1-387.574.5746    [place] Crisis Hotline:    1-720.689.2293 or  796.881.8450    Discharge Survey:   Thank you for choosing [facility]. We hope we did everything we could to make your hospital stay a pleasant one. You may be receiving a phone survey and we would appreciate your time and participation in answering the questions. Your input is very valuable to us in our efforts to improve our service to our patients and their families.        My signature on this form indicates that:    1. I have reviewed and understand the above information.  2. My questions regarding this information have been answered to my satisfaction.  3. I have formulated a plan with my discharge nurse to obtain my prescribed medications for home.                  Disclaimer         __________________________________                     __________       ________                       Patient Signature                                                 Date                    Time

## 2017-07-27 NOTE — IP AVS SNAPSHOT
" <p align=\"LEFT\"><IMG SRC=\"[email or web address]\" alt=\"Image\" WIDTH=\"50%\" HEIGHT=\"200\" BORDER=\"\"></p>                   Name:Alis Sparks  Medical Record Number:[id number]  CSN: [id number]    YOB: 1990   Age: 26 y.o.  Sex: female  HT:1.651 m (5' 5\") WT: 75.8 kg (167 lb 1.7 oz)          Admit Date: 7/27/2017     Discharge Date:   Today's Date: 7/29/2017  Attending Doctor:  Pa Urbina M.D.                  Allergies:  Pcn; Promethazine; and Lisinopril          Follow-up Information     1. Follow up with Navi Huber M.D.. Schedule an appointment as soon as possible for a visit in 1 week.    Specialty:  Family Medicine    Why:   is unable to contact office; closed for the weekend.    Contact information    [address] [place] [address]  [phone]          2. Follow up with Navi Huber M.D..    Specialty:  Family Medicine    Contact information    [address] [place] [address]  [phone]           Medication List      Take these Medications        Instructions    atorvastatin 20 MG Tabs   Commonly known as:  LIPITOR    Take 20 mg by mouth every evening.   Dose:  20 mg       ferrous sulfate 325 (65 FE) MG tablet    Take 325 mg by mouth every day.   Dose:  325 mg       LEVEMIR FLEXPEN 100 UNIT/ML Sopn injection   Generic drug:  insulin detemir    Inject 30 Units as instructed 2 times a day.   Dose:  30 Units       metoclopramide 5 MG/5ML Soln   Commonly known as:  REGLAN    Take 10 mg by mouth 3 times a day, with meals.   Dose:  10 mg       NOVOLOG 100 UNIT/ML Soln   Generic drug:  insulin aspart    Inject 1-3 Units as instructed 3 times a day before meals. 1 unit for every 3 grams of carbohydrate 1 unit for every 50 mg/dL > 150 mg/dL   Dose:  1-3 Units       VITAMIN D PO    Take 1 Cap by mouth every day.   Dose:  1 Cap         "

## 2017-07-28 LAB
ALBUMIN SERPL BCP-MCNC: 2.8 G/DL (ref 3.2–4.9)
ALBUMIN/GLOB SERPL: 1.3 G/DL
ALP SERPL-CCNC: 84 U/L (ref 30–99)
ALT SERPL-CCNC: 8 U/L (ref 2–50)
ANION GAP SERPL CALC-SCNC: 4 MMOL/L (ref 0–11.9)
ANION GAP SERPL CALC-SCNC: 4 MMOL/L (ref 0–11.9)
AST SERPL-CCNC: 9 U/L (ref 12–45)
BASOPHILS # BLD AUTO: 0.6 % (ref 0–1.8)
BASOPHILS # BLD: 0.03 K/UL (ref 0–0.12)
BILIRUB SERPL-MCNC: 0.3 MG/DL (ref 0.1–1.5)
BUN SERPL-MCNC: 13 MG/DL (ref 8–22)
BUN SERPL-MCNC: 14 MG/DL (ref 8–22)
CALCIUM SERPL-MCNC: 8.1 MG/DL (ref 8.5–10.5)
CALCIUM SERPL-MCNC: 8.6 MG/DL (ref 8.5–10.5)
CHLORIDE SERPL-SCNC: 113 MMOL/L (ref 96–112)
CHLORIDE SERPL-SCNC: 114 MMOL/L (ref 96–112)
CO2 SERPL-SCNC: 17 MMOL/L (ref 20–33)
CO2 SERPL-SCNC: 20 MMOL/L (ref 20–33)
CREAT SERPL-MCNC: 0.5 MG/DL (ref 0.5–1.4)
CREAT SERPL-MCNC: 0.54 MG/DL (ref 0.5–1.4)
EOSINOPHIL # BLD AUTO: 0.15 K/UL (ref 0–0.51)
EOSINOPHIL NFR BLD: 3 % (ref 0–6.9)
ERYTHROCYTE [DISTWIDTH] IN BLOOD BY AUTOMATED COUNT: 43.5 FL (ref 35.9–50)
GFR SERPL CREATININE-BSD FRML MDRD: >60 ML/MIN/1.73 M 2
GFR SERPL CREATININE-BSD FRML MDRD: >60 ML/MIN/1.73 M 2
GLOBULIN SER CALC-MCNC: 2.1 G/DL (ref 1.9–3.5)
GLUCOSE BLD-MCNC: 104 MG/DL (ref 65–99)
GLUCOSE BLD-MCNC: 106 MG/DL (ref 65–99)
GLUCOSE BLD-MCNC: 107 MG/DL (ref 65–99)
GLUCOSE BLD-MCNC: 111 MG/DL (ref 65–99)
GLUCOSE BLD-MCNC: 117 MG/DL (ref 65–99)
GLUCOSE BLD-MCNC: 123 MG/DL (ref 65–99)
GLUCOSE BLD-MCNC: 156 MG/DL (ref 65–99)
GLUCOSE BLD-MCNC: 165 MG/DL (ref 65–99)
GLUCOSE BLD-MCNC: 83 MG/DL (ref 65–99)
GLUCOSE BLD-MCNC: 86 MG/DL (ref 65–99)
GLUCOSE BLD-MCNC: 88 MG/DL (ref 65–99)
GLUCOSE BLD-MCNC: 98 MG/DL (ref 65–99)
GLUCOSE SERPL-MCNC: 118 MG/DL (ref 65–99)
GLUCOSE SERPL-MCNC: 85 MG/DL (ref 65–99)
HCT VFR BLD AUTO: 34.8 % (ref 37–47)
HGB BLD-MCNC: 12 G/DL (ref 12–16)
IMM GRANULOCYTES # BLD AUTO: 0.02 K/UL (ref 0–0.11)
IMM GRANULOCYTES NFR BLD AUTO: 0.4 % (ref 0–0.9)
LYMPHOCYTES # BLD AUTO: 2.3 K/UL (ref 1–4.8)
LYMPHOCYTES NFR BLD: 45.7 % (ref 22–41)
MAGNESIUM SERPL-MCNC: 1.6 MG/DL (ref 1.5–2.5)
MCH RBC QN AUTO: 29.3 PG (ref 27–33)
MCHC RBC AUTO-ENTMCNC: 34.5 G/DL (ref 33.6–35)
MCV RBC AUTO: 84.9 FL (ref 81.4–97.8)
MONOCYTES # BLD AUTO: 0.54 K/UL (ref 0–0.85)
MONOCYTES NFR BLD AUTO: 10.7 % (ref 0–13.4)
NEUTROPHILS # BLD AUTO: 1.99 K/UL (ref 2–7.15)
NEUTROPHILS NFR BLD: 39.6 % (ref 44–72)
NRBC # BLD AUTO: 0 K/UL
NRBC BLD AUTO-RTO: 0 /100 WBC
PHOSPHATE SERPL-MCNC: 1.6 MG/DL (ref 2.5–4.5)
PLATELET # BLD AUTO: 172 K/UL (ref 164–446)
PMV BLD AUTO: 9.7 FL (ref 9–12.9)
POTASSIUM SERPL-SCNC: 3.2 MMOL/L (ref 3.6–5.5)
POTASSIUM SERPL-SCNC: 3.5 MMOL/L (ref 3.6–5.5)
POTASSIUM SERPL-SCNC: 3.6 MMOL/L (ref 3.6–5.5)
PROT SERPL-MCNC: 4.9 G/DL (ref 6–8.2)
RBC # BLD AUTO: 4.1 M/UL (ref 4.2–5.4)
SODIUM SERPL-SCNC: 135 MMOL/L (ref 135–145)
SODIUM SERPL-SCNC: 137 MMOL/L (ref 135–145)
WBC # BLD AUTO: 5 K/UL (ref 4.8–10.8)

## 2017-07-28 PROCEDURE — 700101 HCHG RX REV CODE 250: Performed by: HOSPITALIST

## 2017-07-28 PROCEDURE — 700105 HCHG RX REV CODE 258: Performed by: HOSPITALIST

## 2017-07-28 PROCEDURE — 700111 HCHG RX REV CODE 636 W/ 250 OVERRIDE (IP): Performed by: HOSPITALIST

## 2017-07-28 PROCEDURE — 80048 BASIC METABOLIC PNL TOTAL CA: CPT

## 2017-07-28 PROCEDURE — A9270 NON-COVERED ITEM OR SERVICE: HCPCS | Performed by: HOSPITALIST

## 2017-07-28 PROCEDURE — 700102 HCHG RX REV CODE 250 W/ 637 OVERRIDE(OP): Performed by: HOSPITALIST

## 2017-07-28 PROCEDURE — 99233 SBSQ HOSP IP/OBS HIGH 50: CPT | Performed by: HOSPITALIST

## 2017-07-28 PROCEDURE — 82962 GLUCOSE BLOOD TEST: CPT

## 2017-07-28 PROCEDURE — 80053 COMPREHEN METABOLIC PANEL: CPT

## 2017-07-28 PROCEDURE — 770001 HCHG ROOM/CARE - MED/SURG/GYN PRIV*

## 2017-07-28 PROCEDURE — 85025 COMPLETE CBC W/AUTO DIFF WBC: CPT

## 2017-07-28 PROCEDURE — 84132 ASSAY OF SERUM POTASSIUM: CPT

## 2017-07-28 RX ORDER — SODIUM CHLORIDE 9 MG/ML
INJECTION, SOLUTION INTRAVENOUS
Status: ACTIVE
Start: 2017-07-28 | End: 2017-07-28

## 2017-07-28 RX ORDER — INSULIN GLARGINE 100 [IU]/ML
10 INJECTION, SOLUTION SUBCUTANEOUS ONCE
Status: COMPLETED | OUTPATIENT
Start: 2017-07-28 | End: 2017-07-28

## 2017-07-28 RX ORDER — INSULIN GLARGINE 100 [IU]/ML
20 INJECTION, SOLUTION SUBCUTANEOUS ONCE
Status: COMPLETED | OUTPATIENT
Start: 2017-07-28 | End: 2017-07-28

## 2017-07-28 RX ORDER — INSULIN GLARGINE 100 [IU]/ML
30 INJECTION, SOLUTION SUBCUTANEOUS 2 TIMES DAILY
Status: DISCONTINUED | OUTPATIENT
Start: 2017-07-28 | End: 2017-07-29 | Stop reason: HOSPADM

## 2017-07-28 RX ORDER — POTASSIUM CHLORIDE 7.45 MG/ML
10 INJECTION INTRAVENOUS ONCE
Status: DISCONTINUED | OUTPATIENT
Start: 2017-07-28 | End: 2017-07-28

## 2017-07-28 RX ORDER — POTASSIUM CHLORIDE 7.45 MG/ML
10 INJECTION INTRAVENOUS
Status: COMPLETED | OUTPATIENT
Start: 2017-07-28 | End: 2017-07-28

## 2017-07-28 RX ORDER — DEXTROSE MONOHYDRATE 25 G/50ML
25 INJECTION, SOLUTION INTRAVENOUS
Status: DISCONTINUED | OUTPATIENT
Start: 2017-07-28 | End: 2017-07-29 | Stop reason: HOSPADM

## 2017-07-28 RX ORDER — MAGNESIUM SULFATE HEPTAHYDRATE 40 MG/ML
2 INJECTION, SOLUTION INTRAVENOUS ONCE
Status: COMPLETED | OUTPATIENT
Start: 2017-07-28 | End: 2017-07-28

## 2017-07-28 RX ORDER — SODIUM CHLORIDE 9 MG/ML
INJECTION, SOLUTION INTRAVENOUS CONTINUOUS
Status: DISCONTINUED | OUTPATIENT
Start: 2017-07-28 | End: 2017-07-29

## 2017-07-28 RX ORDER — INSULIN GLARGINE 100 [IU]/ML
10 INJECTION, SOLUTION SUBCUTANEOUS EVERY EVENING
Status: DISCONTINUED | OUTPATIENT
Start: 2017-07-28 | End: 2017-07-28

## 2017-07-28 RX ADMIN — METOCLOPRAMIDE HYDROCHLORIDE 10 MG: 10 TABLET ORAL at 10:57

## 2017-07-28 RX ADMIN — POTASSIUM CHLORIDE 10 MEQ: 10 INJECTION, SOLUTION INTRAVENOUS at 07:30

## 2017-07-28 RX ADMIN — OXYCODONE HYDROCHLORIDE 5 MG: 5 TABLET ORAL at 19:58

## 2017-07-28 RX ADMIN — SODIUM PHOSPHATE, MONOBASIC, MONOHYDRATE AND SODIUM PHOSPHATE, DIBASIC, ANHYDROUS 30 MMOL: 276; 142 INJECTION, SOLUTION INTRAVENOUS at 09:53

## 2017-07-28 RX ADMIN — FERROUS SULFATE TAB 325 MG (65 MG ELEMENTAL FE) 325 MG: 325 (65 FE) TAB at 08:40

## 2017-07-28 RX ADMIN — POTASSIUM CHLORIDE 10 MEQ: 10 INJECTION, SOLUTION INTRAVENOUS at 10:02

## 2017-07-28 RX ADMIN — ATORVASTATIN CALCIUM 20 MG: 20 TABLET, FILM COATED ORAL at 20:01

## 2017-07-28 RX ADMIN — METOCLOPRAMIDE HYDROCHLORIDE 10 MG: 10 TABLET ORAL at 06:27

## 2017-07-28 RX ADMIN — POTASSIUM CHLORIDE 10 MEQ: 10 INJECTION, SOLUTION INTRAVENOUS at 02:18

## 2017-07-28 RX ADMIN — POTASSIUM CHLORIDE 10 MEQ: 10 INJECTION, SOLUTION INTRAVENOUS at 08:44

## 2017-07-28 RX ADMIN — POTASSIUM CHLORIDE 10 MEQ: 10 INJECTION, SOLUTION INTRAVENOUS at 01:11

## 2017-07-28 RX ADMIN — METOCLOPRAMIDE HYDROCHLORIDE 10 MG: 10 TABLET ORAL at 17:14

## 2017-07-28 RX ADMIN — INSULIN LISPRO 2 UNITS: 100 INJECTION, SOLUTION INTRAVENOUS; SUBCUTANEOUS at 20:10

## 2017-07-28 RX ADMIN — SODIUM CHLORIDE: 9 INJECTION, SOLUTION INTRAVENOUS at 09:40

## 2017-07-28 RX ADMIN — INSULIN GLARGINE 20 UNITS: 100 INJECTION, SOLUTION SUBCUTANEOUS at 10:57

## 2017-07-28 RX ADMIN — OXYCODONE HYDROCHLORIDE 5 MG: 5 TABLET ORAL at 00:00

## 2017-07-28 RX ADMIN — POTASSIUM CHLORIDE 10 MEQ: 10 INJECTION, SOLUTION INTRAVENOUS at 03:28

## 2017-07-28 RX ADMIN — MAGNESIUM SULFATE IN WATER 2 G: 40 INJECTION, SOLUTION INTRAVENOUS at 01:49

## 2017-07-28 RX ADMIN — INSULIN LISPRO 1 UNITS: 100 INJECTION, SOLUTION INTRAVENOUS; SUBCUTANEOUS at 17:16

## 2017-07-28 RX ADMIN — DEXTROSE AND SODIUM CHLORIDE: 10; .45 INJECTION, SOLUTION INTRAVENOUS at 04:27

## 2017-07-28 RX ADMIN — POTASSIUM CHLORIDE 10 MEQ: 10 INJECTION, SOLUTION INTRAVENOUS at 06:24

## 2017-07-28 RX ADMIN — INSULIN GLARGINE 30 UNITS: 100 INJECTION, SOLUTION SUBCUTANEOUS at 20:10

## 2017-07-28 RX ADMIN — INSULIN LISPRO 1 UNITS: 100 INJECTION, SOLUTION INTRAVENOUS; SUBCUTANEOUS at 11:02

## 2017-07-28 RX ADMIN — CALCIUM GLUCONATE 3 G: 94 INJECTION, SOLUTION INTRAVENOUS at 00:06

## 2017-07-28 RX ADMIN — ENOXAPARIN SODIUM 40 MG: 100 INJECTION SUBCUTANEOUS at 08:39

## 2017-07-28 RX ADMIN — POTASSIUM CHLORIDE 10 MEQ: 10 INJECTION, SOLUTION INTRAVENOUS at 00:05

## 2017-07-28 RX ADMIN — INSULIN GLARGINE 10 UNITS: 100 INJECTION, SOLUTION SUBCUTANEOUS at 06:58

## 2017-07-28 ASSESSMENT — ENCOUNTER SYMPTOMS
SHORTNESS OF BREATH: 0
BACK PAIN: 0
CHILLS: 0
EYE REDNESS: 0
BLURRED VISION: 0
FOCAL WEAKNESS: 0
HEMOPTYSIS: 0
WEAKNESS: 0
FALLS: 0
DIARRHEA: 0
ORTHOPNEA: 0
SPEECH CHANGE: 0
MEMORY LOSS: 0
LOSS OF CONSCIOUSNESS: 0
NERVOUS/ANXIOUS: 0
ABDOMINAL PAIN: 0
EYE DISCHARGE: 0
PALPITATIONS: 0
SPUTUM PRODUCTION: 0
COUGH: 0
FEVER: 0
NAUSEA: 0
SEIZURES: 0
HEADACHES: 0
NECK PAIN: 0
FLANK PAIN: 0
HALLUCINATIONS: 0
EYE PAIN: 0
VOMITING: 0
STRIDOR: 0
BLOOD IN STOOL: 0

## 2017-07-28 ASSESSMENT — LIFESTYLE VARIABLES: SUBSTANCE_ABUSE: 0

## 2017-07-28 ASSESSMENT — PAIN SCALES - GENERAL
PAINLEVEL_OUTOF10: 0
PAINLEVEL_OUTOF10: 6
PAINLEVEL_OUTOF10: 6
PAINLEVEL_OUTOF10: 0
PAINLEVEL_OUTOF10: 0
PAINLEVEL_OUTOF10: 8

## 2017-07-28 NOTE — PROGRESS NOTES
[facility] Hospitalist Progress Note    Date of Service: 2017    Chief Complaint  26 y.o. female admitted 2017 with DKA    Interval Problem Update  Seen in the ICU still on insulin drip but being transitioned off    Consultants/Specialty  None     Disposition  home        Review of Systems   Constitutional: Positive for malaise/fatigue. Negative for fever and chills.   HENT: Negative for congestion, ear discharge and nosebleeds.    Eyes: Negative for blurred vision, pain, discharge and redness.   Respiratory: Negative for cough, hemoptysis, sputum production, shortness of breath and stridor.    Cardiovascular: Negative for chest pain, palpitations, orthopnea and leg swelling.   Gastrointestinal: Negative for nausea, vomiting, abdominal pain, diarrhea, blood in stool and melena.   Genitourinary: Negative for dysuria, hematuria and flank pain.   Musculoskeletal: Negative for back pain, joint pain, falls and neck pain.   Skin: Negative.    Neurological: Negative for speech change, focal weakness, seizures, loss of consciousness, weakness and headaches.   Psychiatric/Behavioral: Negative for hallucinations, memory loss and substance abuse. The patient is not nervous/anxious.    All other systems reviewed and are negative.     Physical Exam  Laboratory/Imaging   Hemodynamics  Temp (24hrs), Av.2 °C (97.1 °F), Min:35.8 °C (96.5 °F), Max:36.5 °C (97.7 °F)   Temperature: 35.8 °C (96.5 °F)  Pulse  Av.9  Min: 74  Max: 132 Heart Rate (Monitored): 81  Blood Pressure: 104/74 mmHg, NIBP: (!) 90/61 mmHg      Respiratory      Respiration: 14, Pulse Oximetry: 97 %             Fluids    Intake/Output Summary (Last 24 hours) at 17 0841  Last data filed at 17 0600   Gross per 24 hour   Intake 2250.2 ml   Output      0 ml   Net 2250.2 ml       Nutrition  Orders Placed This Encounter   Procedures   • DIET ORDER     Standing Status: Standing      Number of Occurrences: 1      Standing Expiration Date:      Order  Specific Question:  Diet:     Answer:  Diabetic [3]     Physical Exam   Constitutional: She is oriented to person, place, and time. She appears well-developed and well-nourished.   HENT:   Head: Normocephalic and atraumatic.   Right Ear: External ear normal.   Left Ear: External ear normal.   Mouth/Throat: No oropharyngeal exudate.   Eyes: Conjunctivae are normal. Right eye exhibits no discharge. Left eye exhibits no discharge. No scleral icterus.   Neck: Neck supple. No JVD present. No tracheal deviation present.   Cardiovascular: Normal rate and regular rhythm.  Exam reveals no gallop and no friction rub.    No murmur heard.  Pulmonary/Chest: Effort normal and breath sounds normal. No stridor. No respiratory distress. She has no wheezes. She has no rales. She exhibits no tenderness.   Abdominal: Soft. Bowel sounds are normal. She exhibits no distension and no mass. There is no tenderness. There is no rebound and no guarding.   Musculoskeletal: She exhibits no edema or tenderness.   Neurological: She is alert and oriented to person, place, and time. No cranial nerve deficit. She exhibits normal muscle tone.   Skin: Skin is warm and dry. She is not diaphoretic. No cyanosis. Nails show no clubbing.   Psychiatric: She has a normal mood and affect. Her behavior is normal. Thought content normal.   Nursing note and vitals reviewed.      Recent Labs      07/27/17   1105  07/27/17   1500  07/28/17   0239   WBC  6.6  6.1  5.0   RBC  5.02  4.29  4.10*   HEMOGLOBIN  14.5  12.7  12.0   HEMATOCRIT  42.4  36.6*  34.8*   MCV  84.5  85.3  84.9   MCH  28.9  29.6  29.3   MCHC  34.2  34.7  34.5   RDW  42.6  43.3  43.5   PLATELETCT  239  206  172   MPV  10.4  10.0  9.7     Recent Labs      07/27/17   2230  07/28/17   0239  07/28/17   0535  07/28/17   0618   SODIUM  138  135   --   137   POTASSIUM  3.0*  3.6  3.5*  3.2*   CHLORIDE  122*  114*   --   113*   CO2  12*  17*   --   20   GLUCOSE  289*  118*   --   85   BUN  13  14   --   13    CREATININE  0.39*  0.50   --   0.54   CALCIUM  5.8*  8.6   --   8.1*                      Assessment/Plan     * DKA (diabetic ketoacidoses) (CMS-HCC)  Assessment & Plan  Resolving transitioning off insulin drip, closely monitor cbg's after off drip    Hypophosphatemia (present on admission)  Assessment & Plan  Sodium phos and monitor    Hypomagnesemia (present on admission)  Assessment & Plan  repleted    Gastroparesis due to DM (CMS-HCC) (present on admission)  Assessment & Plan  Continue reglan and monitor    Hypokalemia (present on admission)  Assessment & Plan  Replete and monitor     Hyponatremia  Assessment & Plan  resolved    Hypertension  Assessment & Plan  stable    Type 1 diabetes mellitus (CMS-HCC)  Assessment & Plan  Uncontrolled with hyperglycemia  Resume levemir and  humalog monitor cbg's and adjust      Labs reviewed, Medications reviewed and Radiology images reviewed  Pitt catheter: No Pitt      DVT Prophylaxis: Enoxaparin (Lovenox)

## 2017-07-28 NOTE — PROGRESS NOTES
Page placed to Dr. Peri Cedillo regarding patient's Mag 1.6 and Phos 1.6, orders received see MAR for Mag, will reassess Phos when 40 meQ of K+ are done running.

## 2017-07-28 NOTE — CARE PLAN
Problem: Safety  Goal: Will remain free from injury  Intervention: Provide assistance with mobility  Patient educated on need for assistance on ambulation, verbalizes understanding. Call light in reach      Problem: Pain Management  Goal: Pain level will decrease to patient’s comfort goal  Intervention: Follow pain managment plan developed in collaboration with patient and Interdisciplinary Team  Pain assessed regularly and appropriate interventions taken

## 2017-07-28 NOTE — PROGRESS NOTES
Bedside report received. Assumed patient care at this time.   Pt in no acute distress.  All lines and drips verified with previous  RN.  Bed alarm on.  Bed in low position.  Call light within reach.  Pt educated on safety precautions and oriented to the call light.  Will continue to monitor.

## 2017-07-28 NOTE — PROGRESS NOTES
Cecelia from Lab called with critical result of Calcium of 5.8 at 2345. Critical lab result read back to Cecelia.   Dr. Teran notified of critical lab result at 2348.  Critical lab result read back by Dr. Teran.

## 2017-07-28 NOTE — CARE PLAN
Problem: Pain Management  Goal: Pain level will decrease to patient’s comfort goal  Outcome: PROGRESSING AS EXPECTED  Oxycodone [id number] ordered  Intervention: Follow pain managment plan developed in collaboration with patient and Interdisciplinary Team  In process  Intervention: Educate and implement non-pharmacologic comfort measures. Examples: relaxation, distration, play therapy, activity therapy, massage, etc.  In progress

## 2017-07-28 NOTE — PROGRESS NOTES
Bedside report received from Marisa CHAVIRA, patient has call light in reach, bed is in low and locked position. Assumed patient care

## 2017-07-28 NOTE — PROGRESS NOTES
Placed paged to hosp on call Dr. Long regarding patient's last bs of 88, orders were placed to transition to low sliding scale, ordered Lantus (see MAR), and a diabetic diet.

## 2017-07-28 NOTE — PROGRESS NOTES
Spoke with Dr. José Miguel Teran concerning BMP.  K+ 3.0, Co2 12 and critical Ca+ of 5.8.  Please see orders.  Also verbal order which I will place under nursing communication to run insulin drip at 2units/hour.

## 2017-07-28 NOTE — H&P
HOSPITAL MEDICINE HISTORY/ PHYSICAL    Date & Time note created:    7/27/2017   5:14 PM       Patient ID:   Name: Alis Sparks  .  YOB: 1990. Age: 26 y.o. female.  MRN: [id number]    PCP : Navi Huber M.D.    CC:   Shortness of breath and chest discomfort x 1 day    HPI:    Bridger is a very pleasant 26 y.o. female with a past medical history of  Type 1 insulin dependent diabetes mellitus, presents to the ER complaining of shortness of breath after waking up today, in addition patient reports having abdominal discomfort, located around bilateral flank area, generalized back, dull, no exacerbating or relieving factors. Patient denies having any fevers or chills, denies having dysuria, denies having any diarrhea. On admission patient's glucose was noted to be 379 with elevated. Hydroxybutyrate consistent with acute diabetic ketoacidosis. Despite patient reporting strict compliance with her diabetes medications her glycohemoglobin was noted to be 14.5. At this time patient will be admitted to the ICU for higher level of care including supportive therapy, initiation of diabetic ketoacidosis protocol.      Review of Systems:    Please see HPI, all other systems were reviewed and are negative (AMA/CMS criteria)              Allergies to Medications  Pcn; Promethazine; and Lisinopril      Past Medical History  1. Insulin-dependent diabetes mellitus type I  2. Medical noncompliance   3. Gastroparesis secondary to diabetes      Family History:  Nice family history of heart disease or cancers    Social History:  Patient denies using Alcohol, tobacco or Illicit drugs.    Outpatient Medications:    Medication   • ferrous sulfate 325 (65 FE) MG tablet   • metoclopramide (REGLAN) 5 MG/5ML Solution   • atorvastatin (LIPITOR) 20 MG Tab   • insulin aspart (NOVOLOG) 100 UNIT/ML Solution         Physical Exam:   Blood pressure 104/74, pulse 85, temperature 36.1 °C (97 °F), resp. rate 15, height 1.651 m  "(5' 5\"), weight 76 kg (167 lb 8.8 oz), SpO2 97 %.  Constitutional:  well-developed but lethargic nontoxic appearance.  HENMT: Normocephalic, atraumatic, b/l ears normal, nose normal  Eyes:  EOMI, conjunctiva normal, no discharge  Neck: no tracheal deviation, supple, no stridor appreciated   Cardiovascular: tachycardic, normal rhythm, no murmurs, no rubs or gallops; no cyanosis, clubbing or edema  Lungs: Respiratory effort is normal,  breath sounds clear to auscultation bilaterally, no wheezes,No rales no rhonchi appreciated  Abdomen: soft, non-tender, non-distended, no guarding or rebound tenderness, no pulsatile masses noted.   Skin: warm, dry, no erythema and no rash  Extremities:  Distal pulses intact +2.  No cyanosis or clubbing. No edema Noted  No joint deformities, Range of motion appears optimal   Neurologic:  Alert and oriented x3.  Cranial nerves II-XII grossly intact.  No   focal deficits.  Psychiatric: Some anxiety or depression      Lab Data Review:    Recent Labs      07/27/17   1105  07/27/17   1500   WBC  6.6  6.1   RBC  5.02  4.29   HEMOGLOBIN  14.5  12.7   HEMATOCRIT  42.4  36.6*   MCV  84.5  85.3   MCH  28.9  29.6   RDW  42.6  43.3   PLATELETCT  239  206   MPV  10.4  10.0   NEUTSPOLYS  61.70  43.70*   LYMPHOCYTES  27.90  42.20*   MONOCYTES  7.70  10.30   EOSINOPHILS  0.80  2.00   BASOPHILS  0.50  0.80     Recent Labs      07/27/17   1105   TROPONINI  <0.01         Recent Labs      07/27/17   1105  07/27/17   1500   SODIUM  134*  137   POTASSIUM  3.8  3.5*   CHLORIDE  108  114*   CO2  10*  13*   BUN  20  16   CREATININE  0.80  0.59   CALCIUM  9.7  8.0*   MAGNESIUM   --   1.6   PHOSPHORUS   --   2.4*   ALBUMIN  4.2  3.3     Recent Labs      07/27/17   1105  07/27/17   1500   ASTSGOT  12  10*   ALTSGPT  14  10   TBILIRUBIN  0.4  0.3   ALKPHOSPHAT  181*  136*   GLOBULIN  2.9  2.3         Imaging/Procedures Review:    DX-CHEST-PORTABLE (1 VIEW)   Final Result      No consolidation.          EKG:   per " my independant read:  HR: 112, sinus tachycardia no ST/T changes      Assessment and Plan:      1. Diabetic ketoacidosis   - With history of type I insulin-dependent diabetes, most likely secondary to medical noncompliance  - Her glycohemoglobin was 14.5  - We will initiate diabetic ketoacidosis protocol  - Provided with generous IV fluid hydration  - Once patient's serum bicarbonate and anion gap normalized, we will then place her on her home dose of Levemir with insulin sliding scale.     2. History of gastroparesis  - Secondary to uncontrolled diabetes mellitus  - Continue when necessary Reglan as needed    3. Hypophosphatemia  - We'll replenish with potassium phosphate. Recheck levels in the morning    4. Hypokalemia  - replenish lytes, will repeat bmp in the morning for any changes    5. Hyperlipidemia  - Continue atorvastatin    Prophylaxis: sc heparin  Code: Full code  Dispo: I anticipate hospital length of stay for medical management to be expected to take greater than than 2 midnights    I have spent a total of 38 minutes providing direct critical care services at the   bedside. There has been no time overlap. The time spent excludes the time   spent performing procedures.         This dictation was created using voice recognition software. The accuracy of the dictation is limited to the abilities of the software. I expect there may be some errors of grammar and possibly content.

## 2017-07-29 ENCOUNTER — PATIENT OUTREACH (OUTPATIENT)
Dept: HEALTH INFORMATION MANAGEMENT | Facility: OTHER | Age: 27
End: 2017-07-29

## 2017-07-29 VITALS
HEIGHT: 65 IN | BODY MASS INDEX: 27.84 KG/M2 | RESPIRATION RATE: 16 BRPM | WEIGHT: 167.11 LBS | DIASTOLIC BLOOD PRESSURE: 57 MMHG | TEMPERATURE: 98.1 F | SYSTOLIC BLOOD PRESSURE: 94 MMHG | HEART RATE: 79 BPM | OXYGEN SATURATION: 96 %

## 2017-07-29 PROBLEM — E87.1 HYPONATREMIA: Status: RESOLVED | Noted: 2017-07-27 | Resolved: 2017-07-29

## 2017-07-29 LAB
ANION GAP SERPL CALC-SCNC: 5 MMOL/L (ref 0–11.9)
BASOPHILS # BLD AUTO: 0.6 % (ref 0–1.8)
BASOPHILS # BLD: 0.03 K/UL (ref 0–0.12)
BUN SERPL-MCNC: 11 MG/DL (ref 8–22)
CALCIUM SERPL-MCNC: 8.7 MG/DL (ref 8.5–10.5)
CHLORIDE SERPL-SCNC: 111 MMOL/L (ref 96–112)
CO2 SERPL-SCNC: 22 MMOL/L (ref 20–33)
CREAT SERPL-MCNC: 0.51 MG/DL (ref 0.5–1.4)
EOSINOPHIL # BLD AUTO: 0.13 K/UL (ref 0–0.51)
EOSINOPHIL NFR BLD: 2.7 % (ref 0–6.9)
ERYTHROCYTE [DISTWIDTH] IN BLOOD BY AUTOMATED COUNT: 44.3 FL (ref 35.9–50)
GFR SERPL CREATININE-BSD FRML MDRD: >60 ML/MIN/1.73 M 2
GLUCOSE BLD-MCNC: 310 MG/DL (ref 65–99)
GLUCOSE BLD-MCNC: 80 MG/DL (ref 65–99)
GLUCOSE SERPL-MCNC: 102 MG/DL (ref 65–99)
HCT VFR BLD AUTO: 40.9 % (ref 37–47)
HGB BLD-MCNC: 13.9 G/DL (ref 12–16)
IMM GRANULOCYTES # BLD AUTO: 0.03 K/UL (ref 0–0.11)
IMM GRANULOCYTES NFR BLD AUTO: 0.6 % (ref 0–0.9)
LYMPHOCYTES # BLD AUTO: 2.39 K/UL (ref 1–4.8)
LYMPHOCYTES NFR BLD: 49 % (ref 22–41)
MAGNESIUM SERPL-MCNC: 1.8 MG/DL (ref 1.5–2.5)
MCH RBC QN AUTO: 28.9 PG (ref 27–33)
MCHC RBC AUTO-ENTMCNC: 34 G/DL (ref 33.6–35)
MCV RBC AUTO: 85 FL (ref 81.4–97.8)
MONOCYTES # BLD AUTO: 0.42 K/UL (ref 0–0.85)
MONOCYTES NFR BLD AUTO: 8.6 % (ref 0–13.4)
NEUTROPHILS # BLD AUTO: 1.88 K/UL (ref 2–7.15)
NEUTROPHILS NFR BLD: 38.5 % (ref 44–72)
NRBC # BLD AUTO: 0 K/UL
NRBC BLD AUTO-RTO: 0 /100 WBC
PHOSPHATE SERPL-MCNC: 2.8 MG/DL (ref 2.5–4.5)
PLATELET # BLD AUTO: 197 K/UL (ref 164–446)
PMV BLD AUTO: 10.3 FL (ref 9–12.9)
POTASSIUM SERPL-SCNC: 3.2 MMOL/L (ref 3.6–5.5)
RBC # BLD AUTO: 4.81 M/UL (ref 4.2–5.4)
SODIUM SERPL-SCNC: 138 MMOL/L (ref 135–145)
WBC # BLD AUTO: 4.9 K/UL (ref 4.8–10.8)

## 2017-07-29 PROCEDURE — 99239 HOSP IP/OBS DSCHRG MGMT >30: CPT | Performed by: HOSPITALIST

## 2017-07-29 PROCEDURE — 700111 HCHG RX REV CODE 636 W/ 250 OVERRIDE (IP): Performed by: HOSPITALIST

## 2017-07-29 PROCEDURE — A9270 NON-COVERED ITEM OR SERVICE: HCPCS | Performed by: HOSPITALIST

## 2017-07-29 PROCEDURE — 700102 HCHG RX REV CODE 250 W/ 637 OVERRIDE(OP): Performed by: HOSPITALIST

## 2017-07-29 PROCEDURE — 82962 GLUCOSE BLOOD TEST: CPT

## 2017-07-29 PROCEDURE — 80048 BASIC METABOLIC PNL TOTAL CA: CPT

## 2017-07-29 PROCEDURE — 84100 ASSAY OF PHOSPHORUS: CPT

## 2017-07-29 PROCEDURE — 83735 ASSAY OF MAGNESIUM: CPT

## 2017-07-29 PROCEDURE — 85025 COMPLETE CBC W/AUTO DIFF WBC: CPT

## 2017-07-29 PROCEDURE — 700105 HCHG RX REV CODE 258

## 2017-07-29 RX ORDER — POTASSIUM CHLORIDE 20 MEQ/1
40 TABLET, EXTENDED RELEASE ORAL DAILY
Status: DISCONTINUED | OUTPATIENT
Start: 2017-07-29 | End: 2017-07-29

## 2017-07-29 RX ORDER — MAGNESIUM SULFATE HEPTAHYDRATE 40 MG/ML
2 INJECTION, SOLUTION INTRAVENOUS ONCE
Status: COMPLETED | OUTPATIENT
Start: 2017-07-29 | End: 2017-07-29

## 2017-07-29 RX ORDER — SODIUM CHLORIDE 9 MG/ML
INJECTION, SOLUTION INTRAVENOUS
Status: COMPLETED
Start: 2017-07-29 | End: 2017-07-29

## 2017-07-29 RX ORDER — POTASSIUM CHLORIDE 20 MEQ/1
40 TABLET, EXTENDED RELEASE ORAL 2 TIMES DAILY
Status: DISCONTINUED | OUTPATIENT
Start: 2017-07-29 | End: 2017-07-29 | Stop reason: HOSPADM

## 2017-07-29 RX ADMIN — FERROUS SULFATE TAB 325 MG (65 MG ELEMENTAL FE) 325 MG: 325 (65 FE) TAB at 08:35

## 2017-07-29 RX ADMIN — METOCLOPRAMIDE HYDROCHLORIDE 10 MG: 10 TABLET ORAL at 11:32

## 2017-07-29 RX ADMIN — ENOXAPARIN SODIUM 40 MG: 100 INJECTION SUBCUTANEOUS at 08:35

## 2017-07-29 RX ADMIN — SODIUM CHLORIDE: 9 INJECTION, SOLUTION INTRAVENOUS at 09:00

## 2017-07-29 RX ADMIN — INSULIN LISPRO 4 UNITS: 100 INJECTION, SOLUTION INTRAVENOUS; SUBCUTANEOUS at 11:32

## 2017-07-29 RX ADMIN — METOCLOPRAMIDE HYDROCHLORIDE 10 MG: 10 TABLET ORAL at 06:06

## 2017-07-29 RX ADMIN — POTASSIUM CHLORIDE 40 MEQ: 1500 TABLET, EXTENDED RELEASE ORAL at 05:31

## 2017-07-29 RX ADMIN — INSULIN GLARGINE 30 UNITS: 100 INJECTION, SOLUTION SUBCUTANEOUS at 08:34

## 2017-07-29 RX ADMIN — MAGNESIUM SULFATE IN WATER 2 G: 40 INJECTION, SOLUTION INTRAVENOUS at 08:36

## 2017-07-29 RX ADMIN — STANDARDIZED SENNA CONCENTRATE AND DOCUSATE SODIUM 2 TABLET: 8.6; 5 TABLET, FILM COATED ORAL at 08:35

## 2017-07-29 ASSESSMENT — PAIN SCALES - GENERAL
PAINLEVEL_OUTOF10: 0

## 2017-07-29 NOTE — DISCHARGE INSTRUCTIONS
Discharge Instructions    Discharged to home by car with relative. Discharged via walking, hospital escort: Yes.  Special equipment needed: Not Applicable    Be sure to schedule a follow-up appointment with your primary care doctor or any specialists as instructed.     Discharge Plan:        I understand that a diet low in cholesterol, fat, and sodium is recommended for good health. Unless I have been given specific instructions below for another diet, I accept this instruction as my diet prescription.   Other diet: Diabetic Diet    Special Instructions: None    · Is patient discharged on Warfarin / Coumadin?   No     · Is patient Post Blood Transfusion?  No    Depression / Suicide Risk    As you are discharged from this [facility] facility, it is important to learn how to keep safe from harming yourself.    Recognize the warning signs:  · Abrupt changes in personality, positive or negative- including increase in energy   · Giving away possessions  · Change in eating patterns- significant weight changes-  positive or negative  · Change in sleeping patterns- unable to sleep or sleeping all the time   · Unwillingness or inability to communicate  · Depression  · Unusual sadness, discouragement and loneliness  · Talk of wanting to die  · Neglect of personal appearance   · Rebelliousness- reckless behavior  · Withdrawal from people/activities they love  · Confusion- inability to concentrate     If you or a loved one observes any of these behaviors or has concerns about self-harm, here's what you can do:  · Talk about it- your feelings and reasons for harming yourself  · Remove any means that you might use to hurt yourself (examples: pills, rope, extension cords, firearm)  · Get professional help from the community (Mental Health, Substance Abuse, psychological counseling)  · Do not be alone:Call your Safe Contact- someone whom you trust who will be there for you.  · Call your local CRISIS HOTLINE 436-4321 or  714.632.8398  · Call your local Children's Mobile Crisis Response Team [place] (090) 368-8757 or www.[employer]  · Call the toll free National Suicide Prevention Hotlines   · National Suicide Prevention Lifeline 389-245-DMAL (3222)  · [employer] Line Network 800-SUICIDE (285-8925)        Diabetic Ketoacidosis  Diabetic ketoacidosis is a life-threatening complication of diabetes. If it is not treated, it can cause severe dehydration and organ damage and can lead to a coma or death.  CAUSES  This condition develops when there is not enough of the hormone insulin in the body. Insulin helps the body to break down sugar for energy. Without insulin, the body cannot break down sugar, so it breaks down fats instead. This leads to the production of acids that are called ketones. Ketones are poisonous at high levels.  This condition can be triggered by:  · Stress on the body that is brought on by an illness.  · Medicines that raise blood glucose levels.  · Not taking diabetes medicine.  SYMPTOMS  Symptoms of this condition include:  2. Fatigue.  3. Weight loss.  4. Excessive thirst.  5. Light-headedness.  6. Fruity or sweet-smelling breath.  7. Excessive urination.  8. Vision changes.  9. Confusion or irritability.  10. Nausea.  11. Vomiting.  12. Rapid breathing.  13. Abdominal pain.  14. Feeling flushed.  DIAGNOSIS  This condition is diagnosed based on a medical history, a physical exam, and blood tests. You may also have a urine test that checks for ketones.  TREATMENT  This condition may be treated with:  2. Fluid replacement. This may be done to correct dehydration.  3. Insulin injections. These may be given through the skin or through an IV tube.  4. Electrolyte replacement. Electrolytes, such as potassium and sodium, may be given in pill form or through an IV tube.  5. Antibiotic medicines. These may be prescribed if your condition was caused by an infection.  HOME CARE INSTRUCTIONS  Eating and  "Drinking  2. Drink enough fluids to keep your urine clear or pale yellow.  3. If you cannot eat, alternate between drinking fluids with sugar (such as juice) and salty fluids (such as broth or bouillon).  4. If you can eat, follow your usual diet and drink sugar-free liquids, such as water.  Other Instructions  2. Take insulin as directed by your health care provider. Do not skip insulin injections. Do not use  insulin.  3. If your blood sugar is over 240 mg/dL, monitor your urine ketones every 4-6 hours.  4. If you were prescribed an antibiotic medicine, finish all of it even if you start to feel better.  5. Rest and exercise only as directed by your health care provider.  6. If you get sick, call your health care provider and begin treatment quickly. Your body often needs extra insulin to fight an illness.  7. Check your blood glucose levels regularly. If your blood glucose is high, drink plenty of fluids. This helps to flush out ketones.  SEEK MEDICAL CARE IF:  · Your blood glucose level is too high or too low.  · You have ketones in your urine.  · You have a fever.  · You cannot eat.  · You cannot tolerate fluids.  · You have been vomiting for more than 2 hours.  · You continue to have symptoms of this condition.  · You develop new symptoms.  SEEK IMMEDIATE MEDICAL CARE IF:  · Your blood glucose levels continue to be high (elevated).  · Your monitor reads \"high\" even when you are taking insulin.  · You faint.  · You have chest pain.  · You have trouble breathing.  · You have a sudden, severe headache.  · You have sudden weakness in one arm or one leg.  · You have sudden trouble speaking or swallowing.  · You have vomiting or diarrhea that gets worse after 3 hours.  · You feel severely fatigued.  · You have trouble thinking.  · You have abdominal pain.  · You are severely dehydrated. Symptoms of severe dehydration include:  ¨ Extreme thirst.  ¨ Dry mouth.  ¨ Blue lips.  ¨ Cold hands and feet.  ¨ Rapid " breathing.     This information is not intended to replace advice given to you by your health care provider. Make sure you discuss any questions you have with your health care provider.     Document Released: 12/15/2001 Document Revised: 05/03/2016 Document Reviewed: 11/25/2015  [email or web address] Interactive Patient Education ©2016 [email or web address]

## 2017-07-29 NOTE — CARE PLAN
Problem: Pain Management  Goal: Pain level will decrease to patient’s comfort goal  Outcome: PROGRESSING AS EXPECTED  Patient educated on different types of pain, non-pharmacological interventions, and medicated once (see mar) for pain.     Problem: Skin Integrity  Goal: Risk for impaired skin integrity will decrease  Outcome: PROGRESSING AS EXPECTED  Patient turns self from side to side, educated on importance of mobility to skin, patient mobilized and skin assessed for breakdown, none apparent at this time.

## 2017-07-29 NOTE — PROGRESS NOTES
Page placed to Hospitalist on call regarding K+ of 3.2 and if okay to stop NS since patient is eating and drinking adequately. Dr. Long returned paged and orders were placed for K+ replacement (See MAR) and okay to stop NS.

## 2017-07-29 NOTE — PROGRESS NOTES
Went over discharge instructions and follow up appointment with patient at bedside. Pt confirms understanding and denies any further questions or concerns. No medication changes. Instructed pt to keep journal of BG to show PCP at f/u appt to better interpret her needs. IVs removed, tips intact. Pt given return to work notice. Pt awaiting Uber to transport. Escorted pt down to lobby per her request to await uber .

## 2017-07-29 NOTE — PROGRESS NOTES
Received bedside report on patient. Assumed care of patient, reviewed chart. Pt appears comfortable, currently sleeping in bed. RA, non- monitored, SL. Call light within reach. Bed in lowest, locked position.

## 2017-07-29 NOTE — DISCHARGE SUMMARY
CHIEF COMPLAINT ON ADMISSION  Chief Complaint   Patient presents with   • Chest Pain   • Shortness of Breath       CODE STATUS  Full Code    HPI & HOSPITAL COURSE  This is a 26 y.o. female here with diabetic ketoacidosis, she is admitted to the intensive care unit started on IV fluid resuscitation and insulin drip her electrolytes were repleted. She clinically improved and was weaned off the insulin drip and transitioned to subcutaneous insulin. She was reevaluated this morning she is tolerating her diet she feels back to her baseline, I extensively counseled her about the importance of monitoring her blood sugars keeping a blood sugar log and complying with insulin and diabetic diet, had asked our schedulers to assist her with making follow-up appointment.    Therefore, she is discharged in good and stable condition with close outpatient follow-up.    SPECIFIC OUTPATIENT FOLLOW-UP  PCP    DISCHARGE PROBLEM LIST  Principal Problem:    DKA (diabetic ketoacidoses) (CMS-HCC) POA: Unknown  Active Problems:    Gastroparesis due to DM (CMS-HCC) POA: Yes    Hypokalemia POA: Yes    Hypertension POA: Unknown    Type 1 diabetes mellitus (CMS-HCC) POA: Unknown  Resolved Problems:    Hypophosphatemia POA: Yes    Hypomagnesemia POA: Yes    History of ketoacidosis POA: Yes    Hyponatremia POA: Unknown      FOLLOW UP  No future appointments.  Navi Huber M.D.  [facility] [address] [place] [id number]  [phone]    Schedule an appointment as soon as possible for a visit in 1 week   is unable to contact office; closed for the weekend.      MEDICATIONS ON DISCHARGE   SparksAlis   Home Medication Instructions MELISSA:[id number]    Printed on:07/29/17 1334   Medication Information                      atorvastatin (LIPITOR) 20 MG Tab  Take 20 mg by mouth every evening.             Cholecalciferol (VITAMIN D PO)  Take 1 Cap by mouth every day.             ferrous sulfate 325 (65 FE) MG tablet  Take 325 mg by mouth every  day.             insulin aspart (NOVOLOG) 100 UNIT/ML Solution  Inject 1-3 Units as instructed 3 times a day before meals. 1 unit for every 3 grams of carbohydrate  1 unit for every 50 mg/dL > 150 mg/dL             insulin detemir (LEVEMIR FLEXPEN) 100 UNIT/ML Solution Pen-injector injection  Inject 30 Units as instructed 2 times a day.             metoclopramide (REGLAN) 5 MG/5ML Solution  Take 10 mg by mouth 3 times a day, with meals.                 DIET  Orders Placed This Encounter   Procedures   • DIET ORDER     Standing Status: Standing      Number of Occurrences: 1      Standing Expiration Date:      Order Specific Question:  Diet:     Answer:  Diabetic [3]       ACTIVITY  As tolerated.  Weight bearing as tolerated      CONSULTATIONS  None    PROCEDURES  None    LABORATORY  Lab Results   Component Value Date/Time    SODIUM 138 07/29/2017 02:56 AM    POTASSIUM 3.2* 07/29/2017 02:56 AM    CHLORIDE 111 07/29/2017 02:56 AM    CO2 22 07/29/2017 02:56 AM    GLUCOSE 102* 07/29/2017 02:56 AM    BUN 11 07/29/2017 02:56 AM    CREATININE 0.51 07/29/2017 02:56 AM        Lab Results   Component Value Date/Time    WBC 4.9 07/29/2017 02:56 AM    HEMOGLOBIN 13.9 07/29/2017 02:56 AM    HEMATOCRIT 40.9 07/29/2017 02:56 AM    PLATELET COUNT 197 07/29/2017 02:56 AM        Total time of the discharge process exceeds 32 minutes

## 2017-07-29 NOTE — CARE PLAN
Problem: Communication  Goal: The ability to communicate needs accurately and effectively will improve  Outcome: PROGRESSING AS EXPECTED  Pt able to communicate adequately and effectively.

## 2017-07-29 NOTE — CARE PLAN
Problem: Safety  Goal: Will remain free from falls  Outcome: PROGRESSING AS EXPECTED  Pt remains free from falls at this time.

## 2017-07-31 LAB — GLUCOSE BLD-MCNC: 223 MG/DL (ref 65–99)

## 2017-08-01 LAB
BACTERIA BLD CULT: NORMAL
BACTERIA BLD CULT: NORMAL
SIGNIFICANT IND 70042: NORMAL
SIGNIFICANT IND 70042: NORMAL
SITE SITE: NORMAL
SITE SITE: NORMAL
SOURCE SOURCE: NORMAL
SOURCE SOURCE: NORMAL

## 2017-08-09 ENCOUNTER — APPOINTMENT (OUTPATIENT)
Dept: RADIOLOGY | Facility: MEDICAL CENTER | Age: 27
DRG: 638 | End: 2017-08-09
Attending: INTERNAL MEDICINE
Payer: MEDICAID

## 2017-08-09 ENCOUNTER — APPOINTMENT (OUTPATIENT)
Dept: RADIOLOGY | Facility: MEDICAL CENTER | Age: 27
DRG: 638 | End: 2017-08-09
Attending: HOSPITALIST
Payer: MEDICAID

## 2017-08-09 ENCOUNTER — HOSPITAL ENCOUNTER (INPATIENT)
Facility: MEDICAL CENTER | Age: 27
LOS: 4 days | DRG: 638 | End: 2017-08-13
Attending: EMERGENCY MEDICINE | Admitting: INTERNAL MEDICINE
Payer: MEDICAID

## 2017-08-09 ENCOUNTER — RESOLUTE PROFESSIONAL BILLING HOSPITAL PROF FEE (OUTPATIENT)
Dept: HOSPITALIST | Facility: MEDICAL CENTER | Age: 27
End: 2017-08-09
Payer: MEDICAID

## 2017-08-09 DIAGNOSIS — E10.10 DIABETIC KETOACIDOSIS WITHOUT COMA ASSOCIATED WITH TYPE 1 DIABETES MELLITUS (HCC): ICD-10-CM

## 2017-08-09 PROBLEM — E87.6 HYPOKALEMIA: Status: RESOLVED | Noted: 2017-05-05 | Resolved: 2017-08-09

## 2017-08-09 PROBLEM — E87.5 HYPERKALEMIA: Status: ACTIVE | Noted: 2017-08-09

## 2017-08-09 PROBLEM — I10 HYPERTENSION: Status: RESOLVED | Noted: 2017-07-27 | Resolved: 2017-08-09

## 2017-08-09 LAB
ACETONE UR QL: ABNORMAL
ALBUMIN SERPL BCP-MCNC: 4.2 G/DL (ref 3.2–4.9)
ALBUMIN/GLOB SERPL: 1.4 G/DL
ALP SERPL-CCNC: 167 U/L (ref 30–99)
ALT SERPL-CCNC: 21 U/L (ref 2–50)
ANION GAP SERPL CALC-SCNC: 12 MMOL/L (ref 0–11.9)
ANION GAP SERPL CALC-SCNC: 17 MMOL/L (ref 0–11.9)
ANION GAP SERPL CALC-SCNC: 22 MMOL/L (ref 0–11.9)
ANION GAP SERPL CALC-SCNC: 27 MMOL/L (ref 0–11.9)
AST SERPL-CCNC: 13 U/L (ref 12–45)
B-OH-BUTYR SERPL-MCNC: >8 MMOL/L (ref 0.02–0.27)
BASOPHILS # BLD AUTO: 0.9 % (ref 0–1.8)
BASOPHILS # BLD: 0.09 K/UL (ref 0–0.12)
BILIRUB SERPL-MCNC: 0.3 MG/DL (ref 0.1–1.5)
BUN SERPL-MCNC: 16 MG/DL (ref 8–22)
BUN SERPL-MCNC: 17 MG/DL (ref 8–22)
BUN SERPL-MCNC: 21 MG/DL (ref 8–22)
BUN SERPL-MCNC: 22 MG/DL (ref 8–22)
CALCIUM SERPL-MCNC: 7.9 MG/DL (ref 8.5–10.5)
CALCIUM SERPL-MCNC: 8.2 MG/DL (ref 8.5–10.5)
CALCIUM SERPL-MCNC: 8.2 MG/DL (ref 8.5–10.5)
CALCIUM SERPL-MCNC: 8.8 MG/DL (ref 8.5–10.5)
CHLORIDE SERPL-SCNC: 106 MMOL/L (ref 96–112)
CHLORIDE SERPL-SCNC: 122 MMOL/L (ref 96–112)
CHLORIDE SERPL-SCNC: 126 MMOL/L (ref 96–112)
CHLORIDE SERPL-SCNC: 127 MMOL/L (ref 96–112)
CO2 SERPL-SCNC: 10 MMOL/L (ref 20–33)
CO2 SERPL-SCNC: 5 MMOL/L (ref 20–33)
CO2 SERPL-SCNC: <5 MMOL/L (ref 20–33)
CO2 SERPL-SCNC: <5 MMOL/L (ref 20–33)
CREAT SERPL-MCNC: 0.7 MG/DL (ref 0.5–1.4)
CREAT SERPL-MCNC: 0.74 MG/DL (ref 0.5–1.4)
CREAT SERPL-MCNC: 0.93 MG/DL (ref 0.5–1.4)
CREAT SERPL-MCNC: 0.95 MG/DL (ref 0.5–1.4)
EOSINOPHIL # BLD AUTO: 0.04 K/UL (ref 0–0.51)
EOSINOPHIL NFR BLD: 0.4 % (ref 0–6.9)
ERYTHROCYTE [DISTWIDTH] IN BLOOD BY AUTOMATED COUNT: 46.6 FL (ref 35.9–50)
GFR SERPL CREATININE-BSD FRML MDRD: >60 ML/MIN/1.73 M 2
GLOBULIN SER CALC-MCNC: 3 G/DL (ref 1.9–3.5)
GLUCOSE BLD-MCNC: 102 MG/DL (ref 65–99)
GLUCOSE BLD-MCNC: 111 MG/DL (ref 65–99)
GLUCOSE BLD-MCNC: 118 MG/DL (ref 65–99)
GLUCOSE BLD-MCNC: 119 MG/DL (ref 65–99)
GLUCOSE BLD-MCNC: 136 MG/DL (ref 65–99)
GLUCOSE BLD-MCNC: 137 MG/DL (ref 65–99)
GLUCOSE BLD-MCNC: 196 MG/DL (ref 65–99)
GLUCOSE BLD-MCNC: 226 MG/DL (ref 65–99)
GLUCOSE BLD-MCNC: 251 MG/DL (ref 65–99)
GLUCOSE BLD-MCNC: 288 MG/DL (ref 65–99)
GLUCOSE BLD-MCNC: 372 MG/DL (ref 65–99)
GLUCOSE BLD-MCNC: 454 MG/DL (ref 65–99)
GLUCOSE BLD-MCNC: 489 MG/DL (ref 65–99)
GLUCOSE BLD-MCNC: 587 MG/DL (ref 65–99)
GLUCOSE BLD-MCNC: 93 MG/DL (ref 65–99)
GLUCOSE BLD-MCNC: 98 MG/DL (ref 65–99)
GLUCOSE BLD-MCNC: >600 MG/DL (ref 65–99)
GLUCOSE BLD-MCNC: >600 MG/DL (ref 65–99)
GLUCOSE SERPL-MCNC: 110 MG/DL (ref 65–99)
GLUCOSE SERPL-MCNC: 161 MG/DL (ref 65–99)
GLUCOSE SERPL-MCNC: 375 MG/DL (ref 65–99)
GLUCOSE SERPL-MCNC: 659 MG/DL (ref 65–99)
HCT VFR BLD AUTO: 46 % (ref 37–47)
HGB BLD-MCNC: 15 G/DL (ref 12–16)
IMM GRANULOCYTES # BLD AUTO: 0.1 K/UL (ref 0–0.11)
IMM GRANULOCYTES NFR BLD AUTO: 1 % (ref 0–0.9)
LYMPHOCYTES # BLD AUTO: 3.64 K/UL (ref 1–4.8)
LYMPHOCYTES NFR BLD: 36.8 % (ref 22–41)
MAGNESIUM SERPL-MCNC: 1.8 MG/DL (ref 1.5–2.5)
MAGNESIUM SERPL-MCNC: 2.2 MG/DL (ref 1.5–2.5)
MCH RBC QN AUTO: 29 PG (ref 27–33)
MCHC RBC AUTO-ENTMCNC: 32.6 G/DL (ref 33.6–35)
MCV RBC AUTO: 89 FL (ref 81.4–97.8)
MONOCYTES # BLD AUTO: 0.84 K/UL (ref 0–0.85)
MONOCYTES NFR BLD AUTO: 8.5 % (ref 0–13.4)
NEUTROPHILS # BLD AUTO: 5.18 K/UL (ref 2–7.15)
NEUTROPHILS NFR BLD: 52.4 % (ref 44–72)
NRBC # BLD AUTO: 0 K/UL
NRBC BLD AUTO-RTO: 0 /100 WBC
PHOSPHATE SERPL-MCNC: 4.2 MG/DL (ref 2.5–4.5)
PHOSPHATE SERPL-MCNC: <1 MG/DL (ref 2.5–4.5)
PLATELET # BLD AUTO: 248 K/UL (ref 164–446)
PMV BLD AUTO: 11.9 FL (ref 9–12.9)
POTASSIUM SERPL-SCNC: 3.8 MMOL/L (ref 3.6–5.5)
POTASSIUM SERPL-SCNC: 4.3 MMOL/L (ref 3.6–5.5)
POTASSIUM SERPL-SCNC: 4.6 MMOL/L (ref 3.6–5.5)
POTASSIUM SERPL-SCNC: 5.6 MMOL/L (ref 3.6–5.5)
PROT SERPL-MCNC: 7.2 G/DL (ref 6–8.2)
RBC # BLD AUTO: 5.17 M/UL (ref 4.2–5.4)
SODIUM SERPL-SCNC: 138 MMOL/L (ref 135–145)
SODIUM SERPL-SCNC: 148 MMOL/L (ref 135–145)
SODIUM SERPL-SCNC: 149 MMOL/L (ref 135–145)
SODIUM SERPL-SCNC: 149 MMOL/L (ref 135–145)
WBC # BLD AUTO: 9.9 K/UL (ref 4.8–10.8)

## 2017-08-09 PROCEDURE — 80048 BASIC METABOLIC PNL TOTAL CA: CPT

## 2017-08-09 PROCEDURE — 96376 TX/PRO/DX INJ SAME DRUG ADON: CPT

## 2017-08-09 PROCEDURE — 83735 ASSAY OF MAGNESIUM: CPT

## 2017-08-09 PROCEDURE — 81002 URINALYSIS NONAUTO W/O SCOPE: CPT

## 2017-08-09 PROCEDURE — 700111 HCHG RX REV CODE 636 W/ 250 OVERRIDE (IP): Performed by: INTERNAL MEDICINE

## 2017-08-09 PROCEDURE — 85025 COMPLETE CBC W/AUTO DIFF WBC: CPT

## 2017-08-09 PROCEDURE — 700102 HCHG RX REV CODE 250 W/ 637 OVERRIDE(OP): Performed by: PHARMACIST

## 2017-08-09 PROCEDURE — 71010 DX-CHEST-PORTABLE (1 VIEW): CPT

## 2017-08-09 PROCEDURE — 96375 TX/PRO/DX INJ NEW DRUG ADDON: CPT

## 2017-08-09 PROCEDURE — 84100 ASSAY OF PHOSPHORUS: CPT

## 2017-08-09 PROCEDURE — 80053 COMPREHEN METABOLIC PANEL: CPT

## 2017-08-09 PROCEDURE — 82010 KETONE BODYS QUAN: CPT

## 2017-08-09 PROCEDURE — 36556 INSERT NON-TUNNEL CV CATH: CPT | Performed by: HOSPITALIST

## 2017-08-09 PROCEDURE — 700111 HCHG RX REV CODE 636 W/ 250 OVERRIDE (IP): Performed by: HOSPITALIST

## 2017-08-09 PROCEDURE — 93005 ELECTROCARDIOGRAM TRACING: CPT | Performed by: EMERGENCY MEDICINE

## 2017-08-09 PROCEDURE — 36556 INSERT NON-TUNNEL CV CATH: CPT

## 2017-08-09 PROCEDURE — 700105 HCHG RX REV CODE 258: Performed by: EMERGENCY MEDICINE

## 2017-08-09 PROCEDURE — 770022 HCHG ROOM/CARE - ICU (200)

## 2017-08-09 PROCEDURE — 99291 CRITICAL CARE FIRST HOUR: CPT | Mod: 25 | Performed by: INTERNAL MEDICINE

## 2017-08-09 PROCEDURE — 96374 THER/PROPH/DIAG INJ IV PUSH: CPT

## 2017-08-09 PROCEDURE — 700105 HCHG RX REV CODE 258: Performed by: HOSPITALIST

## 2017-08-09 PROCEDURE — 82962 GLUCOSE BLOOD TEST: CPT

## 2017-08-09 PROCEDURE — 700101 HCHG RX REV CODE 250: Performed by: INTERNAL MEDICINE

## 2017-08-09 PROCEDURE — 02HV33Z INSERTION OF INFUSION DEVICE INTO SUPERIOR VENA CAVA, PERCUTANEOUS APPROACH: ICD-10-PCS | Performed by: HOSPITALIST

## 2017-08-09 PROCEDURE — 700111 HCHG RX REV CODE 636 W/ 250 OVERRIDE (IP): Performed by: EMERGENCY MEDICINE

## 2017-08-09 PROCEDURE — 99291 CRITICAL CARE FIRST HOUR: CPT

## 2017-08-09 PROCEDURE — B548ZZA ULTRASONOGRAPHY OF SUPERIOR VENA CAVA, GUIDANCE: ICD-10-PCS | Performed by: HOSPITALIST

## 2017-08-09 PROCEDURE — 700102 HCHG RX REV CODE 250 W/ 637 OVERRIDE(OP): Performed by: INTERNAL MEDICINE

## 2017-08-09 PROCEDURE — 700105 HCHG RX REV CODE 258: Performed by: INTERNAL MEDICINE

## 2017-08-09 PROCEDURE — 96361 HYDRATE IV INFUSION ADD-ON: CPT

## 2017-08-09 PROCEDURE — 700105 HCHG RX REV CODE 258: Performed by: PHARMACIST

## 2017-08-09 RX ORDER — MORPHINE SULFATE 4 MG/ML
4 INJECTION, SOLUTION INTRAMUSCULAR; INTRAVENOUS ONCE
Status: COMPLETED | OUTPATIENT
Start: 2017-08-09 | End: 2017-08-09

## 2017-08-09 RX ORDER — POTASSIUM CHLORIDE 14.9 MG/ML
20 INJECTION INTRAVENOUS ONCE
Status: COMPLETED | OUTPATIENT
Start: 2017-08-09 | End: 2017-08-09

## 2017-08-09 RX ORDER — METOCLOPRAMIDE HYDROCHLORIDE 5 MG/5ML
10 SOLUTION ORAL
Status: DISCONTINUED | OUTPATIENT
Start: 2017-08-09 | End: 2017-08-09

## 2017-08-09 RX ORDER — ONDANSETRON 2 MG/ML
4 INJECTION INTRAMUSCULAR; INTRAVENOUS EVERY 4 HOURS PRN
Status: DISCONTINUED | OUTPATIENT
Start: 2017-08-09 | End: 2017-08-13 | Stop reason: HOSPADM

## 2017-08-09 RX ORDER — SODIUM CHLORIDE 9 MG/ML
1000 INJECTION, SOLUTION INTRAVENOUS ONCE
Status: COMPLETED | OUTPATIENT
Start: 2017-08-09 | End: 2017-08-09

## 2017-08-09 RX ORDER — MORPHINE SULFATE 4 MG/ML
2 INJECTION, SOLUTION INTRAMUSCULAR; INTRAVENOUS
Status: DISCONTINUED | OUTPATIENT
Start: 2017-08-09 | End: 2017-08-09

## 2017-08-09 RX ORDER — DEXTROSE AND SODIUM CHLORIDE 5; .9 G/100ML; G/100ML
INJECTION, SOLUTION INTRAVENOUS CONTINUOUS
Status: DISCONTINUED | OUTPATIENT
Start: 2017-08-09 | End: 2017-08-10

## 2017-08-09 RX ORDER — POTASSIUM CHLORIDE 7.45 MG/ML
10 INJECTION INTRAVENOUS ONCE
Status: COMPLETED | OUTPATIENT
Start: 2017-08-09 | End: 2017-08-09

## 2017-08-09 RX ORDER — SODIUM CHLORIDE 9 MG/ML
2000 INJECTION, SOLUTION INTRAVENOUS ONCE
Status: COMPLETED | OUTPATIENT
Start: 2017-08-09 | End: 2017-08-09

## 2017-08-09 RX ORDER — SODIUM CHLORIDE 9 MG/ML
INJECTION, SOLUTION INTRAVENOUS CONTINUOUS
Status: DISCONTINUED | OUTPATIENT
Start: 2017-08-09 | End: 2017-08-09

## 2017-08-09 RX ORDER — DEXTROSE AND SODIUM CHLORIDE 10; .45 G/100ML; G/100ML
INJECTION, SOLUTION INTRAVENOUS CONTINUOUS
Status: DISCONTINUED | OUTPATIENT
Start: 2017-08-09 | End: 2017-08-10

## 2017-08-09 RX ORDER — ONDANSETRON 2 MG/ML
4 INJECTION INTRAMUSCULAR; INTRAVENOUS ONCE
Status: COMPLETED | OUTPATIENT
Start: 2017-08-09 | End: 2017-08-09

## 2017-08-09 RX ORDER — MORPHINE SULFATE 4 MG/ML
1-2 INJECTION, SOLUTION INTRAMUSCULAR; INTRAVENOUS
Status: DISCONTINUED | OUTPATIENT
Start: 2017-08-09 | End: 2017-08-13 | Stop reason: HOSPADM

## 2017-08-09 RX ORDER — MIDAZOLAM HYDROCHLORIDE 1 MG/ML
2 INJECTION INTRAMUSCULAR; INTRAVENOUS ONCE
Status: COMPLETED | OUTPATIENT
Start: 2017-08-09 | End: 2017-08-09

## 2017-08-09 RX ORDER — MAGNESIUM SULFATE HEPTAHYDRATE 40 MG/ML
2 INJECTION, SOLUTION INTRAVENOUS
Status: COMPLETED | OUTPATIENT
Start: 2017-08-09 | End: 2017-08-09

## 2017-08-09 RX ORDER — POLYETHYLENE GLYCOL 3350 17 G/17G
1 POWDER, FOR SOLUTION ORAL
Status: DISCONTINUED | OUTPATIENT
Start: 2017-08-09 | End: 2017-08-13 | Stop reason: HOSPADM

## 2017-08-09 RX ORDER — ATORVASTATIN CALCIUM 20 MG/1
20 TABLET, FILM COATED ORAL NIGHTLY
Status: DISCONTINUED | OUTPATIENT
Start: 2017-08-09 | End: 2017-08-13 | Stop reason: HOSPADM

## 2017-08-09 RX ORDER — OXYCODONE HYDROCHLORIDE 5 MG/1
5 TABLET ORAL
Status: DISCONTINUED | OUTPATIENT
Start: 2017-08-09 | End: 2017-08-13 | Stop reason: HOSPADM

## 2017-08-09 RX ORDER — MAGNESIUM SULFATE HEPTAHYDRATE 40 MG/ML
4 INJECTION, SOLUTION INTRAVENOUS
Status: COMPLETED | OUTPATIENT
Start: 2017-08-09 | End: 2017-08-09

## 2017-08-09 RX ORDER — SODIUM CHLORIDE 9 MG/ML
INJECTION, SOLUTION INTRAVENOUS CONTINUOUS
Status: DISCONTINUED | OUTPATIENT
Start: 2017-08-09 | End: 2017-08-10

## 2017-08-09 RX ORDER — AMOXICILLIN 250 MG
2 CAPSULE ORAL 2 TIMES DAILY
Status: DISCONTINUED | OUTPATIENT
Start: 2017-08-09 | End: 2017-08-13 | Stop reason: HOSPADM

## 2017-08-09 RX ORDER — METOCLOPRAMIDE HYDROCHLORIDE 5 MG/ML
10 INJECTION INTRAMUSCULAR; INTRAVENOUS 3 TIMES DAILY
Status: DISCONTINUED | OUTPATIENT
Start: 2017-08-09 | End: 2017-08-13 | Stop reason: HOSPADM

## 2017-08-09 RX ORDER — OXYCODONE HYDROCHLORIDE 5 MG/1
2.5 TABLET ORAL
Status: DISCONTINUED | OUTPATIENT
Start: 2017-08-09 | End: 2017-08-13 | Stop reason: HOSPADM

## 2017-08-09 RX ORDER — POTASSIUM CHLORIDE 14.9 MG/ML
20 INJECTION INTRAVENOUS ONCE
Status: COMPLETED | OUTPATIENT
Start: 2017-08-09 | End: 2017-08-10

## 2017-08-09 RX ORDER — BISACODYL 10 MG
10 SUPPOSITORY, RECTAL RECTAL
Status: DISCONTINUED | OUTPATIENT
Start: 2017-08-09 | End: 2017-08-13 | Stop reason: HOSPADM

## 2017-08-09 RX ORDER — ONDANSETRON 4 MG/1
4 TABLET, ORALLY DISINTEGRATING ORAL EVERY 4 HOURS PRN
Status: DISCONTINUED | OUTPATIENT
Start: 2017-08-09 | End: 2017-08-13 | Stop reason: HOSPADM

## 2017-08-09 RX ADMIN — DEXTROSE AND SODIUM CHLORIDE: 5; 900 INJECTION, SOLUTION INTRAVENOUS at 14:28

## 2017-08-09 RX ADMIN — SODIUM CHLORIDE 5.6 UNITS/HR: 9 INJECTION, SOLUTION INTRAVENOUS at 18:42

## 2017-08-09 RX ADMIN — MORPHINE SULFATE 4 MG: 4 INJECTION INTRAVENOUS at 08:11

## 2017-08-09 RX ADMIN — POTASSIUM CHLORIDE 10 MEQ: 10 INJECTION, SOLUTION INTRAVENOUS at 13:14

## 2017-08-09 RX ADMIN — SODIUM CHLORIDE 1000 ML: 9 INJECTION, SOLUTION INTRAVENOUS at 05:59

## 2017-08-09 RX ADMIN — SODIUM CHLORIDE 1000 ML: 9 INJECTION, SOLUTION INTRAVENOUS at 12:54

## 2017-08-09 RX ADMIN — METOCLOPRAMIDE 10 MG: 5 INJECTION, SOLUTION INTRAMUSCULAR; INTRAVENOUS at 14:32

## 2017-08-09 RX ADMIN — ONDANSETRON 4 MG: 2 INJECTION INTRAMUSCULAR; INTRAVENOUS at 04:53

## 2017-08-09 RX ADMIN — SODIUM PHOSPHATE, MONOBASIC, MONOHYDRATE AND SODIUM PHOSPHATE, DIBASIC, ANHYDROUS 30 MMOL: 276; 142 INJECTION, SOLUTION INTRAVENOUS at 17:56

## 2017-08-09 RX ADMIN — MAGNESIUM SULFATE IN WATER 2 G: 40 INJECTION, SOLUTION INTRAVENOUS at 17:32

## 2017-08-09 RX ADMIN — METOCLOPRAMIDE 10 MG: 5 INJECTION, SOLUTION INTRAMUSCULAR; INTRAVENOUS at 10:43

## 2017-08-09 RX ADMIN — SODIUM CHLORIDE 1000 ML: 9 INJECTION, SOLUTION INTRAVENOUS at 08:15

## 2017-08-09 RX ADMIN — INSULIN HUMAN 7.5 UNITS: 100 INJECTION, SOLUTION PARENTERAL at 09:46

## 2017-08-09 RX ADMIN — ONDANSETRON 4 MG: 2 INJECTION INTRAMUSCULAR; INTRAVENOUS at 13:28

## 2017-08-09 RX ADMIN — SODIUM CHLORIDE 2000 ML: 9 INJECTION, SOLUTION INTRAVENOUS at 09:12

## 2017-08-09 RX ADMIN — SODIUM CHLORIDE 2000 ML: 9 INJECTION, SOLUTION INTRAVENOUS at 04:54

## 2017-08-09 RX ADMIN — ENOXAPARIN SODIUM 40 MG: 100 INJECTION SUBCUTANEOUS at 10:42

## 2017-08-09 RX ADMIN — SODIUM CHLORIDE 7.6 UNITS/HR: 9 INJECTION, SOLUTION INTRAVENOUS at 09:11

## 2017-08-09 RX ADMIN — MORPHINE SULFATE 2 MG: 4 INJECTION INTRAVENOUS at 13:28

## 2017-08-09 RX ADMIN — DEXTROSE AND SODIUM CHLORIDE: 10; .45 INJECTION, SOLUTION INTRAVENOUS at 16:09

## 2017-08-09 RX ADMIN — ONDANSETRON 4 MG: 2 INJECTION INTRAMUSCULAR; INTRAVENOUS at 08:12

## 2017-08-09 RX ADMIN — MIDAZOLAM HYDROCHLORIDE 2 MG: 1 INJECTION, SOLUTION INTRAMUSCULAR; INTRAVENOUS at 11:06

## 2017-08-09 RX ADMIN — HYDROMORPHONE HYDROCHLORIDE 1 MG: 1 INJECTION, SOLUTION INTRAMUSCULAR; INTRAVENOUS; SUBCUTANEOUS at 05:17

## 2017-08-09 RX ADMIN — POTASSIUM CHLORIDE 20 MEQ: 200 INJECTION, SOLUTION INTRAVENOUS at 22:26

## 2017-08-09 RX ADMIN — POTASSIUM CHLORIDE 20 MEQ: 14.9 INJECTION, SOLUTION INTRAVENOUS at 17:39

## 2017-08-09 RX ADMIN — SODIUM CHLORIDE: 9 INJECTION, SOLUTION INTRAVENOUS at 09:12

## 2017-08-09 RX ADMIN — ONDANSETRON 4 MG: 2 INJECTION INTRAMUSCULAR; INTRAVENOUS at 20:38

## 2017-08-09 RX ADMIN — METOCLOPRAMIDE 10 MG: 5 INJECTION, SOLUTION INTRAMUSCULAR; INTRAVENOUS at 20:38

## 2017-08-09 RX ADMIN — MORPHINE SULFATE 2 MG: 4 INJECTION INTRAVENOUS at 17:11

## 2017-08-09 ASSESSMENT — ENCOUNTER SYMPTOMS
FOCAL WEAKNESS: 0
SORE THROAT: 0
DIZZINESS: 0
MYALGIAS: 1
WEAKNESS: 1
ABDOMINAL PAIN: 1
DIARRHEA: 0
HEADACHES: 0
BACK PAIN: 0
DEPRESSION: 0
NAUSEA: 1
BLOOD IN STOOL: 0
SHORTNESS OF BREATH: 0
HEADACHES: 1
CHILLS: 0
PALPITATIONS: 0
HEARTBURN: 0
FEVER: 0
HALLUCINATIONS: 0
COUGH: 0
VOMITING: 1

## 2017-08-09 ASSESSMENT — PAIN SCALES - GENERAL
PAINLEVEL_OUTOF10: 4
PAINLEVEL_OUTOF10: 4
PAINLEVEL_OUTOF10: 5
PAINLEVEL_OUTOF10: 4
PAINLEVEL_OUTOF10: 8
PAINLEVEL_OUTOF10: 8
PAINLEVEL_OUTOF10: 4
PAINLEVEL_OUTOF10: 4
PAINLEVEL_OUTOF10: 8
PAINLEVEL_OUTOF10: 10

## 2017-08-09 ASSESSMENT — PATIENT HEALTH QUESTIONNAIRE - PHQ9
1. LITTLE INTEREST OR PLEASURE IN DOING THINGS: NOT AT ALL
2. FEELING DOWN, DEPRESSED, IRRITABLE, OR HOPELESS: NOT AT ALL
SUM OF ALL RESPONSES TO PHQ QUESTIONS 1-9: 0
SUM OF ALL RESPONSES TO PHQ9 QUESTIONS 1 AND 2: 0

## 2017-08-09 ASSESSMENT — LIFESTYLE VARIABLES
DO YOU DRINK ALCOHOL: NO
DO YOU DRINK ALCOHOL: NO

## 2017-08-09 NOTE — ASSESSMENT & PLAN NOTE
Gap closed, off insulin drip.  On ISS AC and HS.  Was started on lantus 10mg BID.   Per patient she takes 30 unit BID at home. Inc dose to 20 BID.   Monitor blood sugars closely.

## 2017-08-09 NOTE — ED NOTES
"[facility]    Chief Complaint   Patient presents with   • High Blood Sugar     FSBS 517   • N/V       Pt BIBA from home. Family called when pt experienced severe pain, N/V around 2300.  Upon EMS arrival, FSBS 517.  PMH: DM, DKA, gastroparesis.  PT received 4 zofran and 500 ml NS.    Blood Pressure: 111/73 mmHg, Pulse: (!) 133, Respiration: 20, Temperature: 36.8 °C (98.2 °F), Height: 170.2 cm (5' 7.01\"), Weight: 75.751 kg (167 lb), BMI (Calculated): 26.15, BSA (Calculated): 1.9, Pulse Oximetry: 99 %, O2 Delivery: None (Room Air)      "

## 2017-08-09 NOTE — IP AVS SNAPSHOT
8/13/2017    Alis Sparks  [address]  [place] [address]    Dear Alis:    [facility] wants to ensure your discharge home is safe and you or your loved ones have had all of your questions answered regarding your care after you leave the hospital.    Below is a list of resources and contact information should you have any questions regarding your hospital stay, follow-up instructions, or active medical symptoms.    Questions or Concerns Regarding… Contact   Medical Questions Related to Your Discharge  (7 days a week, 8am-5pm) Contact a Nurse Care Coordinator   [phone]   Medical Questions Not Related to Your Discharge  (24 hours a day / 7 days a week)  Contact the Nurse Health Line   [phone]    Medications or Discharge Instructions Refer to your discharge packet   or contact your [facility] Primary Care Provider   [phone]   Follow-up Appointment(s) Schedule your appointment via [employer]   or contact Scheduling [phone]   Billing Review your statement via [employer]  or contact Billing [phone]   Medical Records Review your records via [employer]   or contact Medical Records [phone]     You may receive a telephone call within two days of discharge. This call is to make certain you understand your discharge instructions and have the opportunity to have any questions answered. You can also easily access your medical information, test results and upcoming appointments via the [employer] free online health management tool. You can learn more and sign up at [employer] urSelf. For assistance setting up your [employer] account, please call [phone].    Once again, we want to ensure your discharge home is safe and that you have a clear understanding of any next steps in your care. If you have any questions or concerns, please do not hesitate to contact us, we are here for you. Thank you for choosing [facility] for your healthcare needs.    Sincerely,    Your [facility] Healthcare Team

## 2017-08-09 NOTE — ED NOTES
Patient moved to R11, placed back on cardiac monitor.  Patient denies needs at this time, pt updated on poc

## 2017-08-09 NOTE — IP AVS SNAPSHOT
" Home Care Instructions                                                                                                                  Name:Alis Sparks  Medical Record Number:[id number]  CSN: [id number]    YOB: 1990   Age: 27 y.o.  Sex: female  HT:1.702 m (5' 7.01\") WT: 73.5 kg (162 lb 0.6 oz)          Admit Date: 8/9/2017     Discharge Date:   Today's Date: 8/13/2017  Attending Doctor:  Valeria Guzman D.O.                  Allergies:  Pcn; Promethazine; and Lisinopril            Discharge Instructions       Discharge Instructions    Discharged to home by taxi with self. Discharged via walking, hospital escort: Refused.  Special equipment needed: Not Applicable    Be sure to schedule a follow-up appointment with your primary care doctor or any specialists as instructed.     Discharge Plan:   Diet Plan: Discussed  Activity Level: Discussed  Confirmed Follow up Appointment: No (Comments) (Followup with PCP if needed. )  Confirmed Symptoms Management: Discussed  Medication Reconciliation Updated: Yes  Influenza Vaccine Indication: Not indicated: Previously immunized this influenza season and > 8 years of age    I understand that a diet low in cholesterol, fat, and sodium is recommended for good health. Unless I have been given specific instructions below for another diet, I accept this instruction as my diet prescription.   Other diet: Diabetic, low sugar. Check your blood sugars before your meals and before bedtime.     Special Instructions: None    · Is patient discharged on Warfarin / Coumadin?   No     · Is patient Post Blood Transfusion?  No    Diabetic Ketoacidosis  Diabetic ketoacidosis is a life-threatening complication of diabetes. If it is not treated, it can cause severe dehydration and organ damage and can lead to a coma or death.  CAUSES  This condition develops when there is not enough of the hormone insulin in the body. Insulin helps the body to break down sugar for energy. Without " insulin, the body cannot break down sugar, so it breaks down fats instead. This leads to the production of acids that are called ketones. Ketones are poisonous at high levels.  This condition can be triggered by:  Stress on the body that is brought on by an illness.  Medicines that raise blood glucose levels.  Not taking diabetes medicine.  SYMPTOMS  Symptoms of this condition include:  Fatigue.  Weight loss.  Excessive thirst.  Light-headedness.  Fruity or sweet-smelling breath.  Excessive urination.  Vision changes.  Confusion or irritability.  Nausea.  Vomiting.  Rapid breathing.  Abdominal pain.  Feeling flushed.  DIAGNOSIS  This condition is diagnosed based on a medical history, a physical exam, and blood tests. You may also have a urine test that checks for ketones.  TREATMENT  This condition may be treated with:  Fluid replacement. This may be done to correct dehydration.  Insulin injections. These may be given through the skin or through an IV tube.  Electrolyte replacement. Electrolytes, such as potassium and sodium, may be given in pill form or through an IV tube.  Antibiotic medicines. These may be prescribed if your condition was caused by an infection.  HOME CARE INSTRUCTIONS  Eating and Drinking  Drink enough fluids to keep your urine clear or pale yellow.  If you cannot eat, alternate between drinking fluids with sugar (such as juice) and salty fluids (such as broth or bouillon).  If you can eat, follow your usual diet and drink sugar-free liquids, such as water.  Other Instructions  Take insulin as directed by your health care provider. Do not skip insulin injections. Do not use  insulin.  If your blood sugar is over 240 mg/dL, monitor your urine ketones every 4-6 hours.  If you were prescribed an antibiotic medicine, finish all of it even if you start to feel better.  Rest and exercise only as directed by your health care provider.  If you get sick, call your health care provider and begin  "treatment quickly. Your body often needs extra insulin to fight an illness.  Check your blood glucose levels regularly. If your blood glucose is high, drink plenty of fluids. This helps to flush out ketones.  SEEK MEDICAL CARE IF:  Your blood glucose level is too high or too low.  You have ketones in your urine.  You have a fever.  You cannot eat.  You cannot tolerate fluids.  You have been vomiting for more than 2 hours.  You continue to have symptoms of this condition.  You develop new symptoms.  SEEK IMMEDIATE MEDICAL CARE IF:  Your blood glucose levels continue to be high (elevated).  Your monitor reads \"high\" even when you are taking insulin.  You faint.  You have chest pain.  You have trouble breathing.  You have a sudden, severe headache.  You have sudden weakness in one arm or one leg.  You have sudden trouble speaking or swallowing.  You have vomiting or diarrhea that gets worse after 3 hours.  You feel severely fatigued.  You have trouble thinking.  You have abdominal pain.  You are severely dehydrated. Symptoms of severe dehydration include:  Extreme thirst.  Dry mouth.  Blue lips.  Cold hands and feet.  Rapid breathing.     This information is not intended to replace advice given to you by your health care provider. Make sure you discuss any questions you have with your health care provider.     Document Released: 12/15/2001 Document Revised: 05/03/2016 Document Reviewed: 11/25/2015  [employer] Interactive Patient Education ©2016 [employer]      Hyperglycemia  Hyperglycemia occurs when the glucose (sugar) in your blood is too high. Hyperglycemia can happen for many reasons, but it most often happens to people who do not know they have diabetes or are not managing their diabetes properly.   CAUSES   Whether you have diabetes or not, there are other causes of hyperglycemia. Hyperglycemia can occur when you have diabetes, but it can also occur in other situations that you might not be as aware of, such " "as:  Diabetes  · If you have diabetes and are having problems controlling your blood glucose, hyperglycemia could occur because of some of the following reasons:  ¨ Not following your meal plan.  ¨ Not taking your diabetes medications or not taking it properly.  ¨ Exercising less or doing less activity than you normally do.  ¨ Being sick.  Pre-diabetes  · This cannot be ignored. Before people develop Type 2 diabetes, they almost always have \"pre-diabetes.\" This is when your blood glucose levels are higher than normal, but not yet high enough to be diagnosed as diabetes. Research has shown that some long-term damage to the body, especially the heart and circulatory system, may already be occurring during pre-diabetes. If you take action to manage your blood glucose when you have pre-diabetes, you may delay or prevent Type 2 diabetes from developing.  Stress  · If you have diabetes, you may be \"diet\" controlled or on oral medications or insulin to control your diabetes. However, you may find that your blood glucose is higher than usual in the hospital whether you have diabetes or not. This is often referred to as \"stress hyperglycemia.\" Stress can elevate your blood glucose. This happens because of hormones put out by the body during times of stress. If stress has been the cause of your high blood glucose, it can be followed regularly by your caregiver. That way he/she can make sure your hyperglycemia does not continue to get worse or progress to diabetes.  Steroids  · Steroids are medications that act on the infection fighting system (immune system) to block inflammation or infection. One side effect can be a rise in blood glucose. Most people can produce enough extra insulin to allow for this rise, but for those who cannot, steroids make blood glucose levels go even higher. It is not unusual for steroid treatments to \"uncover\" diabetes that is developing. It is not always possible to determine if the hyperglycemia " will go away after the steroids are stopped. A special blood test called an A1c is sometimes done to determine if your blood glucose was elevated before the steroids were started.  SYMPTOMS  · Thirsty.  · Frequent urination.  · Dry mouth.  · Blurred vision.  · Tired or fatigue.  · Weakness.  · Sleepy.  · Tingling in feet or leg.  DIAGNOSIS   Diagnosis is made by monitoring blood glucose in one or all of the following ways:  · A1c test. This is a chemical found in your blood.  · Fingerstick blood glucose monitoring.  · Laboratory results.  TREATMENT   First, knowing the cause of the hyperglycemia is important before the hyperglycemia can be treated. Treatment may include, but is not be limited to:  · Education.  · Change or adjustment in medications.  · Change or adjustment in meal plan.  · Treatment for an illness, infection, etc.  · More frequent blood glucose monitoring.  · Change in exercise plan.  · Decreasing or stopping steroids.  · Lifestyle changes.  HOME CARE INSTRUCTIONS   · Test your blood glucose as directed.  · Exercise regularly. Your caregiver will give you instructions about exercise. Pre-diabetes or diabetes which comes on with stress is helped by exercising.  · Eat wholesome, balanced meals. Eat often and at regular, fixed times. Your caregiver or nutritionist will give you a meal plan to guide your sugar intake.  · Being at an ideal weight is important. If needed, losing as little as 10 to 15 pounds may help improve blood glucose levels.  SEEK MEDICAL CARE IF:   · You have questions about medicine, activity, or diet.  · You continue to have symptoms (problems such as increased thirst, urination, or weight gain).  SEEK IMMEDIATE MEDICAL CARE IF:   · You are vomiting or have diarrhea.  · Your breath smells fruity.  · You are breathing faster or slower.  · You are very sleepy or incoherent.  · You have numbness, tingling, or pain in your feet or hands.  · You have chest pain.  · Your symptoms get  worse even though you have been following your caregiver's orders.  · If you have any other questions or concerns.     This information is not intended to replace advice given to you by your health care provider. Make sure you discuss any questions you have with your health care provider.     Document Released: 06/13/2002 Document Revised: 03/11/2013 Document Reviewed: 04/15/2013  [employer] Interactive Patient Education ©2016 [employer]      Follow-up Information     1. Follow up with Navi Huber M.D.. Schedule an appointment as soon as possible for a visit in 1 week.    Specialty:  Family Medicine    Contact information    [address] [address]  [phone]          2. Follow up with [facility], Emergency Dept.    Specialty:  Emergency Medicine    Why:  If symptoms worsen    Contact information    [address] [facility]  [place] [id number] [phone]         Discharge Medication Instructions:    Below are the medications your physician expects you to take upon discharge:    Review all your home medications and newly ordered medications with your doctor and/or pharmacist. Follow medication instructions as directed by your doctor and/or pharmacist.    Please keep your medication list with you and share with your physician.               Medication List      START taking these medications        Instructions    Morning Afternoon Evening Bedtime    insulin glargine 100 UNIT/ML Soln   Last time this was given:  25 Units on 8/13/2017  8:15 AM   Commonly known as:  LANTUS   Next Dose Due:  Tonight with dinner, 8/13/17.        Inject 30 Units as instructed 2 times a day.   Dose:  30 Units                        metoclopramide 10 MG Tabs   Commonly known as:  REGLAN   Replaces:  metoclopramide 5 MG/5ML Soln        Take 1 Tab by mouth 1 time daily as needed (gastroparesis symptoms).   Dose:  10 mg                          CHANGE how you take these medications        Instructions     Morning Afternoon Evening Bedtime    * atorvastatin 20 MG Tabs   What changed:  Another medication with the same name was added. Make sure you understand how and when to take each.   Last time this was given:  20 mg on 8/12/2017  8:43 PM   Commonly known as:  LIPITOR   Next Dose Due:  Tonight before bed, 8/13/17.        Take 20 mg by mouth every evening.   Dose:  20 mg                        * atorvastatin 20 MG Tabs   What changed:  You were already taking a medication with the same name, and this prescription was added. Make sure you understand how and when to take each.   Last time this was given:  20 mg on 8/12/2017  8:43 PM   Commonly known as:  LIPITOR        Take 1 Tab by mouth every day.   Dose:  20 mg                        insulin aspart 100 UNIT/ML Soln   What changed:  additional instructions   Commonly known as:  NOVOLOG   Next Dose Due:  Tonight with dinner, 8/13/17.   Notes to Patient:  Blood sugar: 200-250 = 1 unit, 251-300 = 2 units, 301+ = 3 units.        Inject 1-3 Units as instructed 3 times a day before meals for 30 days.   Dose:  1-3 Units                        * Notice:  This list has 2 medication(s) that are the same as other medications prescribed for you. Read the directions carefully, and ask your doctor or other care provider to review them with you.      CONTINUE taking these medications        Instructions    Morning Afternoon Evening Bedtime    ferrous sulfate 325 (65 FE) MG tablet   Next Dose Due:  Tomorrow morning, 8/14/17.        Take 1 Tab by mouth every day.   Dose:  325 mg                        VITAMIN D PO   Next Dose Due:  Tomorrow morning, 8/14/17.        Take 1 Cap by mouth every day.   Dose:  1 Cap                          STOP taking these medications     LEVEMIR FLEXPEN 100 UNIT/ML Sopn injection   Generic drug:  insulin detemir               metoclopramide 5 MG/5ML Soln   Commonly known as:  REGLAN   Replaced by:  metoclopramide 10 MG Tabs                    Where to  Get Your Medications      Information about where to get these medications is not yet available     ! Ask your nurse or doctor about these medications    - atorvastatin 20 MG Tabs  - ferrous sulfate 325 (65 FE) MG tablet  - insulin aspart 100 UNIT/ML Soln  - insulin glargine 100 UNIT/ML Soln  - metoclopramide 10 MG Tabs            Instructions           Diet / Nutrition:    Follow any diet instructions given to you by your doctor or the dietician, including how much salt (sodium) you are allowed each day.    If you are overweight, talk to your doctor about a weight reduction plan.    Activity:    Remain physically active following your doctor's instructions about exercise and activity.    Rest often.     Any time you become even a little tired or short of breath, SIT DOWN and rest.    Worsening Symptoms:    Report any of the following signs and symptoms to the doctor's office immediately:    *Pain of jaw, arm, or neck  *Chest pain not relieved by medication                               *Dizziness or loss of consciousness  *Difficulty breathing even when at rest   *More tired than usual                                       *Bleeding drainage or swelling of surgical site  *Swelling of feet, ankles, legs or stomach                 *Fever (>100ºF)  *Pink or blood tinged sputum  *Weight gain (3lbs/day or 5lbs /week)           *Shock from internal defibrillator (if applicable)  *Palpitations or irregular heartbeats                *Cool and/or numb extremities    Stroke Awareness    Common Risk Factors for Stroke include:    Age  Atrial Fibrillation  Carotid Artery Stenosis  Diabetes Mellitus  Excessive alcohol consumption  High blood pressure  Overweight   Physical inactivity  Smoking    Warning signs and symptoms of a stroke include:    *Sudden numbness or weakness of the face, arm or leg (especially on one side of the body).  *Sudden confusion, trouble speaking or understanding.  *Sudden trouble seeing in one or both  eyes.  *Sudden trouble walking, dizziness, loss of balance or coordination.Sudden severe headache with no known cause.    It is very important to get treatment quickly when a stroke occurs. If you experience any of the above warning signs, call 911 immediately.                   Disclaimer         Quit Smoking / Tobacco Use:    I understand the use of any tobacco products increases my chance of suffering from future heart disease or stroke and could cause other illnesses which may shorten my life. Quitting the use of tobacco products is the single most important thing I can do to improve my health. For further information on smoking / tobacco cessation call a Toll Free Quit Line at 1-892.640.3189 (*[employer] [place]) or 1-832.129.9115 (American [employer] Association) or you can access the web based program at www.lungUniversity of [place].org.    [place] Tobacco Users Help Line:  (876) 585-5195       Toll Free: 1-383.632.3009  Quit Tobacco Program [facility] (582)502-0791    Crisis Hotline:    [place] Crisis Hotline:  7-149-FNPEEXM or 1-589.203.5125    [place] Crisis Hotline:    1-191.375.4161 or 068-186-5960    Discharge Survey:   Thank you for choosing [facility]. We hope we did everything we could to make your hospital stay a pleasant one. You may be receiving a phone survey and we would appreciate your time and participation in answering the questions. Your input is very valuable to us in our efforts to improve our service to our patients and their families.        My signature on this form indicates that:    1. I have reviewed and understand the above information.  2. My questions regarding this information have been answered to my satisfaction.  3. I have formulated a plan with my discharge nurse to obtain my prescribed medications for home.                  Disclaimer         __________________________________                     __________       ________                       Patient Signature                                                  Date                    Time

## 2017-08-09 NOTE — ED PROVIDER NOTES
ED Provider Note    Scribed for Lilliana Tam D.O. by Ananth Otero. 8/9/2017, 4:53 AM.    Primary care provider: Navi Huber M.D.  Means of arrival: EMS  History obtained from: patient  History limited by: none    CHIEF COMPLAINT  Chief Complaint   Patient presents with   • High Blood Sugar     FSBS 517   • N/V       HPI  Alis Sparks is a 27 y.o. female who presents to the Emergency Department complaining of diffuse abdominal pain with assocaited nausea and vomiting starting last night. Patient reports a history of DKA 2 weeks ago and 1 year ago. She denies dysuria, diarrhea, fever, sore throat, medication non-complaince. She does complain of diffuse muscle aches. Symptoms started about 11:00 last night. She denies any missed doses or change in her medication. Thing seems to make the symptoms better or worse.    REVIEW OF SYSTEMS  Review of Systems   Constitutional: Negative for fever.   HENT: Negative for sore throat.    Respiratory: Negative for shortness of breath.    Cardiovascular: Positive for chest pain.   Gastrointestinal: Positive for nausea, vomiting and abdominal pain. Negative for diarrhea.   Genitourinary: Negative for dysuria.   Musculoskeletal: Positive for myalgias.   Neurological: Positive for weakness and headaches.   All other systems reviewed and are negative.  C.    PAST MEDICAL HISTORY   has a past medical history of Diabetes type 1, controlled (CMS-[facility]); DKA (diabetic ketoacidoses) (CMS-HCC); UTI (urinary tract infection); Kidney infection; Pneumonia; Backpain; Bronchitis; Fall; and Gastroparesis.    SURGICAL HISTORY   has past surgical history that includes gastroscopy-endo (9/18/2014); gastroscopy with biopsy (9/18/2014); and other.    SOCIAL HISTORY  Social History   Substance Use Topics   • Smoking status: Never Smoker    • Smokeless tobacco: Never Used   • Alcohol Use: No      History   Drug Use No       FAMILY HISTORY  Family History   Problem Relation Age of  Onset   • Cancer       breasts, multiple family members       CURRENT MEDICATIONS    Current facility-administered medications:   •  atorvastatin (LIPITOR) tablet 20 mg, 20 mg, Oral, Nightly, Izabela Mccallum D.O.  •  dextrose 10% and 0.45% NaCl infusion, , Intravenous, Continuous, Izabela Mccallum D.O., Stopped at 08/09/17 0845  •  MD ALERT-PHARMACY TO CONSULT FOR DKA MONITORING 1 Each, 1 Each, Other, PRN, Izabela Mccallum D.O.  •  Adult DKA potassium(K+) replacement scale, 1 Each, Intravenous, Q4HRS, Izabela Mccallum D.O.  •  senna-docusate (PERICOLACE or SENOKOT S) 8.6-50 MG per tablet 2 Tab, 2 Tab, Oral, BID, 2 Tab at 08/09/17 0900 **AND** polyethylene glycol/lytes (MIRALAX) PACKET 1 Packet, 1 Packet, Oral, QDAY PRN **AND** magnesium hydroxide (MILK OF MAGNESIA) suspension 30 mL, 30 mL, Oral, QDAY PRN **AND** bisacodyl (DULCOLAX) suppository 10 mg, 10 mg, Rectal, QDAY PRN, Izabela Mccallum D.O.  •  magnesium sulfate IVPB premix 2 g, 2 g, Intravenous, Once PRN **OR** magnesium sulfate IVPB premix 4 g, 4 g, Intravenous, Once PRN, Izabela Mccallum D.O.  •  potassium phosphates 30 mmol in  mL ivpb, 30 mmol, Intravenous, Once PRN **OR** sodium phosphate 30 mmol in 1/2  mL ivpb, 30 mmol, Intravenous, Once PRN, Izabela Mccallum D.O.  •  NS infusion, , Intravenous, Continuous, Izabela Mccallum D.O., Last Rate: 125 mL/hr at 08/09/17 0912  •  D5 NS infusion, , Intravenous, Continuous, Izabela Mccallum D.O., Stopped at 08/09/17 0845  •  enoxaparin (LOVENOX) inj 40 mg, 40 mg, Subcutaneous, DAILY, Izabela Mccallum D.O., 40 mg at 08/09/17 1042  •  Notify provider if pain remains uncontrolled, , , CONTINUOUS **AND** Use the numeric rating scale (NRS-11) on regular floors and Critical-Care Pain Observation Tool (CPOT) on ICUs/Trauma to assess pain, , , CONTINUOUS **AND** Pulse Ox (Oximetry), , , CONTINUOUS **AND** Pharmacy Consult Request ...Pain Management Review, , Other, PRN **AND** If patient difficult  "to arouse and/or has respiratory depression, stop any opiates that are currently infusing and call a Rapid Response., , , CONTINUOUS **AND** oxycodone immediate-release (ROXICODONE) tablet 2.5 mg, 2.5 mg, Oral, Q3HRS PRN **AND** oxycodone immediate-release (ROXICODONE) tablet 5 mg, 5 mg, Oral, Q3HRS PRN **AND** morphine (pf) 4 mg/ml injection 2 mg, 2 mg, Intravenous, Q3HRS PRN, Izabela Mccallum, D.O.  •  ondansetron (ZOFRAN) syringe/vial injection 4 mg, 4 mg, Intravenous, Q4HRS PRN, PAULO Nichole.O.  •  ondansetron (ZOFRAN ODT) dispertab 4 mg, 4 mg, Oral, Q4HRS PRN, Izabela Mccallum D.O.  •  insulin regular human (HUMULIN/NOVOLIN R) 62.5 Units in  mL Infusion for DKA, 7.6 Units/hr, Intravenous, Continuous, Suzan Redding, PHARMD, Last Rate: 22 mL/hr at 08/09/17 1115, 5.6 Units/hr at 08/09/17 1115  •  metoclopramide (REGLAN) injection 10 mg, 10 mg, Intravenous, TID, Izabela Mccallum, D.O., 10 mg at 08/09/17 1043    ALLERGIES  Allergies   Allergen Reactions   • Pcn [Penicillins] Shortness of Breath and Swelling     Per patient's Mom, patient tolerates Keflex   • Promethazine Vomiting   • Lisinopril      Per historical: Reported pedal swelling. No facial/angioedema or rash nor respiratory symptoms.        PHYSICAL EXAM  VITAL SIGNS: /73 mmHg  Pulse 133  Temp(Src) 36.8 °C (98.2 °F)  Resp 20  Ht 1.702 m (5' 7.01\")  Wt 75.751 kg (167 lb)  BMI 26.15 kg/m2  SpO2 99%  Vitals reviewed.  Constitutional: Patient is oriented to person, place, and time. Ill-appearing. Moderate distress.   Head: Normocephalic and atraumatic.   Ears: Normal external ears bilaterally.   Mouth/Throat: Oropharynx is clear and moist  Eyes: Conjunctivae are normal. Pupils are equal, round, and reactive to light.   Neck: Normal range of motion. Neck supple.  Cardiovascular: Tachycardic rate, regular rhythm and normal heart sounds. Normal peripheral pulses.  Pulmonary/Chest: Tachypnea Effort normal and breath sounds normal. No " respiratory distress, no wheezes, rhonchi, or rales.   Abdominal: Soft. Bowel sounds are normal. There is diffuse tenderness, rebound or guarding, or peritoneal signs, no masses.   Musculoskeletal: No edema and diffuse myalgias  Neurological: No focal deficits.   Skin: Skin is warm and dry. No erythema. No pallor.   Psychiatric: Patient has a normal mood and affect.     LABS  Results for orders placed or performed during the hospital encounter of 08/09/17   CBC w/ Differential   Result Value Ref Range    WBC 9.9 4.8 - 10.8 K/uL    RBC 5.17 4.20 - 5.40 M/uL    Hemoglobin 15.0 12.0 - 16.0 g/dL    Hematocrit 46.0 37.0 - 47.0 %    MCV 89.0 81.4 - 97.8 fL    MCH 29.0 27.0 - 33.0 pg    MCHC 32.6 (L) 33.6 - 35.0 g/dL    RDW 46.6 35.9 - 50.0 fL    Platelet Count 248 164 - 446 K/uL    MPV 11.9 9.0 - 12.9 fL    Neutrophils-Polys 52.40 44.00 - 72.00 %    Lymphocytes 36.80 22.00 - 41.00 %    Monocytes 8.50 0.00 - 13.40 %    Eosinophils 0.40 0.00 - 6.90 %    Basophils 0.90 0.00 - 1.80 %    Immature Granulocytes 1.00 (H) 0.00 - 0.90 %    Nucleated RBC 0.00 /100 WBC    Neutrophils (Absolute) 5.18 2.00 - 7.15 K/uL    Lymphs (Absolute) 3.64 1.00 - 4.80 K/uL    Monos (Absolute) 0.84 0.00 - 0.85 K/uL    Eos (Absolute) 0.04 0.00 - 0.51 K/uL    Baso (Absolute) 0.09 0.00 - 0.12 K/uL    Immature Granulocytes (abs) 0.10 0.00 - 0.11 K/uL    NRBC (Absolute) 0.00 K/uL   BETA-HYDROXYBUTYRIC ACID   Result Value Ref Range    beta-Hydroxybutyric Acid >8.00 (H) 0.02 - 0.27 mmol/L   KETONES-URINE QUAL(ACETONE URINE QUAL)   Result Value Ref Range    Ketones Large (A) Negative   COMP METABOLIC PANEL   Result Value Ref Range    Sodium 138 135 - 145 mmol/L    Potassium 5.6 (H) 3.6 - 5.5 mmol/L    Chloride 106 96 - 112 mmol/L    Co2 <5 (LL) 20 - 33 mmol/L    Anion Gap 27.0 (H) 0.0 - 11.9    Glucose 659 (HH) 65 - 99 mg/dL    Bun 22 8 - 22 mg/dL    Creatinine 0.93 0.50 - 1.40 mg/dL    Calcium 8.8 8.5 - 10.5 mg/dL    AST(SGOT) 13 12 - 45 U/L    ALT(SGPT)  21 2 - 50 U/L    Alkaline Phosphatase 167 (H) 30 - 99 U/L    Total Bilirubin 0.3 0.1 - 1.5 mg/dL    Albumin 4.2 3.2 - 4.9 g/dL    Total Protein 7.2 6.0 - 8.2 g/dL    Globulin 3.0 1.9 - 3.5 g/dL    A-G Ratio 1.4 g/dL   ESTIMATED GFR   Result Value Ref Range    GFR If African American >60 >60 mL/min/1.73 m 2    GFR If Non African American >60 >60 mL/min/1.73 m 2   ACCU-CHEK GLUCOSE   Result Value Ref Range    Glucose - Accu-Ck 587 (HH) 65 - 99 mg/dL   ACCU-CHEK GLUCOSE   Result Value Ref Range    Glucose - Accu-Ck 489 (HH) 65 - 99 mg/dL   All labs reviewed by me.    EKG Interpretation  Interpreted by me    Rhythm: sinus tachycardia  Rate: 134  Axis: normal  Ectopy: none  Conduction: normal  ST Segments: no acute change  T Waves: no acute change  Q Waves: none  Comparison from 7/27/17 shows sinus tachycardia 112.   Clinical Impression: no acute changes and normal EKG    COURSE & MEDICAL DECISION MAKING  Pertinent Labs & Imaging studies reviewed. (See chart for details)    Obtained and reviewed past medical records. She does indeed have a recent history of admission for DKA, seen at the end of July.    4:53 AM - Patient seen and examined at bedside. Patient's ill-appearing. She is to Neck and tachycardic. I suspect, highly, that this is DKA. She does have moist mucous membranes. Patient will be treated with Dilaudid 1 mg, NS 2000 ml for dilution of DKA and fluid resuscitation, Zofran 4 mg. Ordered Accu-check glucose, CBC with differential, CMP, Beta-hydroxybutyric acid, Ketones urine qual to evaluate her symptoms. The differential diagnoses include but are not limited to: DKA, electrolyte disturbance, gastroparesis.      6:05 AM patient reevaluated. Still awaiting lab results. IV fluids infusing. The patient remains tachycardic and tachypneic.    7:10 AM - Lab and diagnostics have not been resulted yet. I contacted lab for results.     7:42 AM - Paged hospitalist. Patient received 3 L of fluid. Additional liter will be  given.     7:43 AM - Patient reevaluated at bedside. Patient reports feeling improved. Discussed treatment plan and admission to the hospital. Patient is still tachycardic and tachypneic although she does look better. She reports feeling better.    7:50 AM - I discussed the patient's case and the above findings with Dr. Mccallum (hospitalist) who agrees to admit the patient. She will be discussed here to see the patient shortly and will start insulin drip per protocol      DISPOSITION:  Patient will be admitted to hospital in guarded condition.    CRITICAL CARE  The very real possibilty of a deterioration of this patient's condition required the highest level of my preparedness for sudden, emergent intervention.  I provided critical care services, which included medication orders, frequent reevaluations of the patient's condition and response to treatment, ordering and reviewing test results, and discussing the case with various consultants.  The critical care time associated with the care of the patient was 35 minutes. Review chart for interventions. This time is exclusive of any other billable procedures.       FINAL IMPRESSION  1. Diabetic ketoacidosis without coma associated with type 1 diabetes mellitus (CMS-[facility])    Critical care time 35 minutes.      Ananth BRAMBILA (Annia), am scribing for, and in the presence of, Lilliana Tam D.O..    Electronically signed by: Ananth Otero (Annia), 8/9/2017    Lilliana BRAMBILA D.O. personally performed the services described in this documentation, as scribed by Ananth Otero in my presence, and it is both accurate and complete.    The note accurately reflects work and decisions made by me.  Lilliana Tam  8/9/2017  12:22 PM

## 2017-08-09 NOTE — IP AVS SNAPSHOT
" <p align=\"LEFT\"><IMG SRC=\"//[email or web address]\" alt=\"Image\" WIDTH=\"50%\" HEIGHT=\"200\" BORDER=\"\"></p>                   Name:Alis Sparks  Medical Record Number:[id number]  CSN: [id number]    YOB: 1990   Age: 27 y.o.  Sex: female  HT:1.702 m (5' 7.01\") WT: 73.5 kg (162 lb 0.6 oz)          Admit Date: 8/9/2017     Discharge Date:   Today's Date: 8/13/2017  Attending Doctor:  Valeria Guzman D.O.                  Allergies:  Pcn; Promethazine; and Lisinopril          Follow-up Information     1. Follow up with Navi Huber M.D.. Schedule an appointment as soon as possible for a visit in 1 week.    Specialty:  Family Medicine    Contact information    [address] [place] [id number]  [phone]          2. Follow up with [facility], Emergency Dept.    Specialty:  Emergency Medicine    Why:  If symptoms worsen    Contact information    [address] [place] [id number] [phone]         Medication List      Take these Medications        Instructions    * atorvastatin 20 MG Tabs   What changed:  Another medication with the same name was added. Make sure you understand how and when to take each.   Commonly known as:  LIPITOR    Take 20 mg by mouth every evening.   Dose:  20 mg       * atorvastatin 20 MG Tabs   What changed:  You were already taking a medication with the same name, and this prescription was added. Make sure you understand how and when to take each.   Commonly known as:  LIPITOR    Take 1 Tab by mouth every day.   Dose:  20 mg       ferrous sulfate 325 (65 FE) MG tablet    Take 1 Tab by mouth every day.   Dose:  325 mg       insulin aspart 100 UNIT/ML Soln   What changed:  additional instructions   Commonly known as:  NOVOLOG   Notes to Patient:  Blood sugar: 200-250 = 1 unit, 251-300 = 2 units, 301+ = 3 units.    Inject 1-3 Units as instructed 3 times a day before meals for 30 days.   Dose:  1-3 Units       insulin glargine 100 UNIT/ML Soln   "   Commonly known as:  LANTUS    Inject 30 Units as instructed 2 times a day.   Dose:  30 Units       metoclopramide 10 MG Tabs   Commonly known as:  REGLAN    Take 1 Tab by mouth 1 time daily as needed (gastroparesis symptoms).   Dose:  10 mg       VITAMIN D PO    Take 1 Cap by mouth every day.   Dose:  1 Cap       * Notice:  This list has 2 medication(s) that are the same as other medications prescribed for you. Read the directions carefully, and ask your doctor or other care provider to review them with you.

## 2017-08-09 NOTE — PROCEDURES
Procedure: Internal jugular central venous catheter, U/S guided.    Indication: Diabetic Ketoacidosis    Summary: Consent was obtained from the prior to the procedure. The patient's right neck region was prepped and draped in sterile fashion using chlorhexidine scrub. Anesthesia was achieved with 1% lidocaine. The right internal jugular vein was accessed under ultrasound.  Venous blood was withdrawn and the sheath was advanced into the vein and the needle was withdrawn. A guidewire was advanced through the sheath. A small incision was made with a 10 blade scalpel and the sheath was exchanged for a dilator over the guidewire until appropriate dilation was obtained. The dilator was removed and a triple-lumen catheter was advanced over the guidewire and secured into place with sutures  At time of procedure completion, all ports aspirated and flushed properly.  Post-procedure x-ray is pending.    Estimated blood loss: 5ml    Complicaitons: none

## 2017-08-09 NOTE — IP AVS SNAPSHOT
[employer] Access Code: [email or web address]  Expires: 9/9/2017  4:16 AM    [employer]  A secure, online tool to manage your health information     [employer]® is a secure, online tool that connects you to your personalized health information from the privacy of your home -- day or night - making it very easy for you to manage your healthcare. Once the activation process is completed, you can even access your medical information using the [employer] leyda, which is available for free in the Apple Leyda store or Google Play store.     [employer] provides the following levels of access (as shown below):   My Chart Features   [facility] Primary Care Doctor [facility]  Specialists [facility]  Urgent  Care Non-[facility]  Primary Care  Doctor   Email your healthcare team securely and privately 24/7 X X X X   Manage appointments: schedule your next appointment; view details of past/upcoming appointments X      Request prescription refills. X      View recent personal medical records, including lab and immunizations X X X X   View health record, including health history, allergies, medications X X X X   Read reports about your outpatient visits, procedures, consult and ER notes X X X X   See your discharge summary, which is a recap of your hospital and/or ER visit that includes your diagnosis, lab results, and care plan. X X       How to register for [employer]:  1. Go to  [email or web address].  2. Click on the Sign Up Now box, which takes you to the New Member Sign Up page. You will need to provide the following information:  a. Enter your [employer] Access Code exactly as it appears at the top of this page. (You will not need to use this code after you’ve completed the sign-up process. If you do not sign up before the expiration date, you must request a new code.)   b. Enter your date of birth.   c. Enter your home email address.   d. Click Submit, and follow the next screen’s instructions.  3. Create a [employer] ID. This will be your [employer]  login ID and cannot be changed, so think of one that is secure and easy to remember.  4. Create a [employer] password. You can change your password at any time.  5. Enter your Password Reset Question and Answer. This can be used at a later time if you forget your password.   6. Enter your e-mail address. This allows you to receive e-mail notifications when new information is available in [employer].  7. Click Sign Up. You can now view your health information.    For assistance activating your [employer] account, call [phone]

## 2017-08-09 NOTE — ASSESSMENT & PLAN NOTE
Uncontrolled HbA1c 14.5,  Multiple admissions for DKA  She says she is compliant with medications but later on tells me she only has little of her lantus at home.

## 2017-08-09 NOTE — H&P
Hospital Medicine History and Physical    Date of Service  8/9/2017    Chief Complaint  Chief Complaint   Patient presents with   • High Blood Sugar     FSBS 517   • N/V       History of Presenting Illness  27 y.o. female with a history of poorly controlled type I diabetes and multiple episodes of DKA in the past, most recently 2 weeks ago who presented 8/9/2017 with nausea vomiting abdominal pain    She was in her normal state of health yesterday. She says she has been taking her insulin as prescribed and has not missed any doses.  Last night around 11 PM she developed a generalized abdominal pain. She subsequently began having nausea and then developed vomiting. She has been vomiting about every 15-20 minutes with occasional breaks in between. She denies any vomiting blood. Her diffuse abdominal pain has been persistent. She denies any pain with urination or diarrhea. She does have a history of gastroparesis and does occasionally have episodes of nausea and vomiting however this is more severe and more similar to her episodes of DKA in the past.    She denies any fevers or chills no cough no shortness of breath or chest pain. She denies any rashes. No recent illness.    She has been very weak and progressively lethargic as the hours went on. She has not eaten or drank anything since last evening.    Primary Care Physician  Navi Huber M.D.    Consultants  None    Code Status  Full    Review of Systems  Review of Systems   Constitutional: Positive for malaise/fatigue. Negative for fever and chills.   HENT: Negative for sore throat.    Respiratory: Negative for cough and shortness of breath.    Cardiovascular: Negative for chest pain and palpitations.   Gastrointestinal: Positive for nausea, vomiting and abdominal pain. Negative for heartburn, diarrhea and blood in stool.   Genitourinary: Negative for dysuria and frequency.   Musculoskeletal: Positive for myalgias. Negative for back pain.    Neurological: Positive for weakness. Negative for dizziness, focal weakness and headaches.   Psychiatric/Behavioral: Negative for depression and hallucinations.   All other systems reviewed and are negative.         Past Medical History  Past Medical History   Diagnosis Date   • Diabetes type 1, controlled (CMS-[facility])      tests 4-5 times daily   • DKA (diabetic ketoacidoses) (CMS-[facility])    • UTI (urinary tract infection)    • Kidney infection    • Pneumonia    • Backpain    • Bronchitis    • Fall    • Gastroparesis        Surgical History  Past Surgical History   Procedure Laterality Date   • Gastroscopy-endo  9/18/2014     Performed by Sylvain PERRY M.D. at [facility]   • Gastroscopy with biopsy  9/18/2014     Performed by Sylvain PERRY M.D. at [facility]   • Other       surgery to patch lung after pneumor from central line placement       Medications  No current facility-administered medications on file prior to encounter.     Current Outpatient Prescriptions on File Prior to Encounter   Medication Sig Dispense Refill   • insulin detemir (LEVEMIR FLEXPEN) 100 UNIT/ML Solution Pen-injector injection Inject 30 Units as instructed 2 times a day.     • Cholecalciferol (VITAMIN D PO) Take 1 Cap by mouth every day.     • ferrous sulfate 325 (65 FE) MG tablet Take 325 mg by mouth every day.     • metoclopramide (REGLAN) 5 MG/5ML Solution Take 10 mg by mouth 3 times a day, with meals.     • atorvastatin (LIPITOR) 20 MG Tab Take 20 mg by mouth every evening.     • insulin aspart (NOVOLOG) 100 UNIT/ML Solution Inject 1-3 Units as instructed 3 times a day before meals. 1 unit for every 3 grams of carbohydrate  1 unit for every 50 mg/dL > 150 mg/dL         Family History  Family History   Problem Relation Age of Onset   • Cancer       breasts, multiple family members       Social History  Social History   Substance Use Topics   • Smoking status: Never Smoker    • Smokeless tobacco: Never Used   •  Alcohol Use: No       Allergies  Allergies   Allergen Reactions   • Pcn [Penicillins] Shortness of Breath and Swelling     Per patient's Mom, patient tolerates Keflex   • Promethazine Vomiting   • Lisinopril      Per historical: Reported pedal swelling. No facial/angioedema or rash nor respiratory symptoms.         Physical Exam  Laboratory   Hemodynamics  Temp (24hrs), Av.6 °C (97.9 °F), Min:36.4 °C (97.6 °F), Max:36.8 °C (98.2 °F)   Temperature: 36.4 °C (97.6 °F) (pt arrived to unit)  Pulse  Av.9  Min: 117  Max: 134 Heart Rate (Monitored): (!) 123  Blood Pressure: 111/73 mmHg, NIBP: 104/55 mmHg      Respiratory      Respiration: (!) 25, Pulse Oximetry: 98 %             Physical Exam   Constitutional: She is oriented to person, place, and time. She appears well-developed and well-nourished. She appears distressed.   Neck: No JVD present. No thyromegaly present.   Cardiovascular: Regular rhythm.    No murmur heard.  Tachycardic   Pulmonary/Chest: She is in respiratory distress. She has no wheezes. She exhibits no tenderness.   Kussmaul breathing   Abdominal: She exhibits no distension and no mass. There is tenderness. There is guarding. There is no rebound.   Musculoskeletal: She exhibits no edema or tenderness.   Neurological: She is oriented to person, place, and time. No cranial nerve deficit. Coordination normal.   Generally weak of all extremities 3-4/5   Skin: Rash (2-5 inch patch of raised 1mm erythematous papules on left forearm.) noted. She is diaphoretic. No erythema. There is pallor.   Psychiatric: She has a normal mood and affect. Her behavior is normal.   Lethargic, answers one word       Recent Labs      17   0406   WBC  9.9   RBC  5.17   HEMOGLOBIN  15.0   HEMATOCRIT  46.0   MCV  89.0   MCH  29.0   MCHC  32.6*   RDW  46.6   PLATELETCT  248   MPV  11.9     Recent Labs      17   0622   SODIUM  138   POTASSIUM  5.6*   CHLORIDE  106   CO2  <5*   GLUCOSE  659*   BUN  22   CREATININE   0.93   CALCIUM  8.8     Recent Labs      08/09/17   0622   ALTSGPT  21   ASTSGOT  13   ALKPHOSPHAT  167*   TBILIRUBIN  0.3   GLUCOSE  659*                 Lab Results   Component Value Date    TROPONINI <0.01 07/27/2017       Imaging  Dx-chest-portable (1 View) Reviewed independently and per radiology  8/9/2017  No consolidation.       Assessment/Plan     I anticipate this patient will require at least two midnights for appropriate medical management, necessitating inpatient admission.    * DKA (diabetic ketoacidoses) (CMS-HCC)  Assessment & Plan  Severe acidosis, no obvious inciting event. She says she has not missed any of her insulin.  No evidence of infection.  -ICU, she is medically ill  -Chest x-ray and UA to eval for infectious source as inciting event  -Insulin bolus 7.5 units ×1  -Insulin drip at 0.1 u/kg per hour  -Every hour glucose check  -NS at 125, changed to D5NS when sugar reaches less than 250    Gastroparesis due to DM (CMS-HCC) (present on admission)  Assessment & Plan  Change Reglan to IV as she is having abdominal pain and vomiting.  -Needs tighter glucose control as outpatient    Type 1 diabetes mellitus (CMS-HCC) (present on admission)  Assessment & Plan  Uncontrolled, she is followed by a PCP but not endocrinologist. She has had multiple admissions for DKA and is very uncontrolled as an outpatient with an A1c of greater than 14.  -Social work to assist with endocrine follow-up outpatient  -She will need dose adjustments of her insulin on discharge.    Hyperkalemia  Assessment & Plan  Due to dehydration and DKA.  No EKG changes  -IV fluids  -Insulin bolus and gtt  -Telemetry  -Consider calcium gluconate if still elevated or rising    Metabolic acidosis, increased anion gap (present on admission)  Assessment & Plan  Due to DKA  -Metabolic panel every 6 hours  -Follow mag and phosphorus    Dehydration (present on admission)  Assessment & Plan  Severe, due to DKA.  -Aggressive normal saline  -She  has received 4 L bolus in the emergency room  -Additional 2 L bolus  -Then NS  -Trend electrolytes every 6    Nausea and vomiting in adult patient (present on admission)  Assessment & Plan  Due to DKA. She also has a history of gastroparesis  -Stated allergy to Compazine and promethazine  -Zofran as needed  -Reglan 3 times a day      VTE prophylaxis: Lovenox.       35 minutes of critical care time were spent, including examination and interview of the patient, explanation of prognosis/diagnosis/plan of care, direction of nursing staff and discussion of the patient with other physicians involved.  This time was independent of procedures performed.    Greater than 50% of which was spent at the bedside.

## 2017-08-10 LAB
ANION GAP SERPL CALC-SCNC: 6 MMOL/L (ref 0–11.9)
ANION GAP SERPL CALC-SCNC: 6 MMOL/L (ref 0–11.9)
ANION GAP SERPL CALC-SCNC: 8 MMOL/L (ref 0–11.9)
ANION GAP SERPL CALC-SCNC: 8 MMOL/L (ref 0–11.9)
ANION GAP SERPL CALC-SCNC: 9 MMOL/L (ref 0–11.9)
BASE EXCESS BLDV CALC-SCNC: -9 MMOL/L (ref -4–3)
BASE EXCESS BLDV CALC-SCNC: -9 MMOL/L (ref -4–3)
BODY TEMPERATURE: ABNORMAL DEGREES
BODY TEMPERATURE: ABNORMAL DEGREES
BUN SERPL-MCNC: 11 MG/DL (ref 8–22)
BUN SERPL-MCNC: 11 MG/DL (ref 8–22)
BUN SERPL-MCNC: 13 MG/DL (ref 8–22)
BUN SERPL-MCNC: 16 MG/DL (ref 8–22)
BUN SERPL-MCNC: 17 MG/DL (ref 8–22)
CA-I BLD ISE-SCNC: 1.36 MMOL/L (ref 1.1–1.3)
CA-I BLD ISE-SCNC: 1.37 MMOL/L (ref 1.1–1.3)
CALCIUM SERPL-MCNC: 7.7 MG/DL (ref 8.5–10.5)
CALCIUM SERPL-MCNC: 7.7 MG/DL (ref 8.5–10.5)
CALCIUM SERPL-MCNC: 7.9 MG/DL (ref 8.5–10.5)
CALCIUM SERPL-MCNC: 7.9 MG/DL (ref 8.5–10.5)
CALCIUM SERPL-MCNC: 8 MG/DL (ref 8.5–10.5)
CHLORIDE SERPL-SCNC: 119 MMOL/L (ref 96–112)
CHLORIDE SERPL-SCNC: 122 MMOL/L (ref 96–112)
CHLORIDE SERPL-SCNC: 123 MMOL/L (ref 96–112)
CHLORIDE SERPL-SCNC: 126 MMOL/L (ref 96–112)
CHLORIDE SERPL-SCNC: 127 MMOL/L (ref 96–112)
CO2 BLDV-SCNC: 18 MMOL/L (ref 20–33)
CO2 BLDV-SCNC: 18 MMOL/L (ref 20–33)
CO2 SERPL-SCNC: 10 MMOL/L (ref 20–33)
CO2 SERPL-SCNC: 12 MMOL/L (ref 20–33)
CO2 SERPL-SCNC: 15 MMOL/L (ref 20–33)
CO2 SERPL-SCNC: 15 MMOL/L (ref 20–33)
CO2 SERPL-SCNC: 17 MMOL/L (ref 20–33)
CREAT SERPL-MCNC: 0.46 MG/DL (ref 0.5–1.4)
CREAT SERPL-MCNC: 0.53 MG/DL (ref 0.5–1.4)
CREAT SERPL-MCNC: 0.59 MG/DL (ref 0.5–1.4)
CREAT SERPL-MCNC: 0.6 MG/DL (ref 0.5–1.4)
CREAT SERPL-MCNC: 0.67 MG/DL (ref 0.5–1.4)
GFR SERPL CREATININE-BSD FRML MDRD: >60 ML/MIN/1.73 M 2
GLUCOSE BLD-MCNC: 100 MG/DL (ref 65–99)
GLUCOSE BLD-MCNC: 102 MG/DL (ref 65–99)
GLUCOSE BLD-MCNC: 108 MG/DL (ref 65–99)
GLUCOSE BLD-MCNC: 111 MG/DL (ref 65–99)
GLUCOSE BLD-MCNC: 115 MG/DL (ref 65–99)
GLUCOSE BLD-MCNC: 125 MG/DL (ref 65–99)
GLUCOSE BLD-MCNC: 125 MG/DL (ref 65–99)
GLUCOSE BLD-MCNC: 131 MG/DL (ref 65–99)
GLUCOSE BLD-MCNC: 133 MG/DL (ref 65–99)
GLUCOSE BLD-MCNC: 151 MG/DL (ref 65–99)
GLUCOSE BLD-MCNC: 156 MG/DL (ref 65–99)
GLUCOSE BLD-MCNC: 163 MG/DL (ref 65–99)
GLUCOSE BLD-MCNC: 171 MG/DL (ref 65–99)
GLUCOSE BLD-MCNC: 177 MG/DL (ref 65–99)
GLUCOSE BLD-MCNC: 179 MG/DL (ref 65–99)
GLUCOSE BLD-MCNC: 180 MG/DL (ref 65–99)
GLUCOSE BLD-MCNC: 183 MG/DL (ref 65–99)
GLUCOSE BLD-MCNC: 187 MG/DL (ref 65–99)
GLUCOSE BLD-MCNC: 214 MG/DL (ref 65–99)
GLUCOSE BLD-MCNC: 224 MG/DL (ref 65–99)
GLUCOSE BLD-MCNC: 97 MG/DL (ref 65–99)
GLUCOSE BLD-MCNC: 99 MG/DL (ref 65–99)
GLUCOSE SERPL-MCNC: 102 MG/DL (ref 65–99)
GLUCOSE SERPL-MCNC: 110 MG/DL (ref 65–99)
GLUCOSE SERPL-MCNC: 139 MG/DL (ref 65–99)
GLUCOSE SERPL-MCNC: 172 MG/DL (ref 65–99)
GLUCOSE SERPL-MCNC: 235 MG/DL (ref 65–99)
HCO3 BLDV-SCNC: 16.6 MMOL/L (ref 24–28)
HCO3 BLDV-SCNC: 17 MMOL/L (ref 24–28)
HCT VFR BLD CALC: 34 % (ref 37–47)
HGB BLD-MCNC: 11.6 G/DL (ref 12–16)
MAGNESIUM SERPL-MCNC: 2.2 MG/DL (ref 1.5–2.5)
O2/TOTAL GAS SETTING VFR VENT: 21 %
O2/TOTAL GAS SETTING VFR VENT: 21 %
PCO2 BLDV: 35.7 MMHG (ref 41–51)
PCO2 BLDV: 36.1 MMHG (ref 41–51)
PCO2 TEMP ADJ BLDV: 35.8 MMHG (ref 41–51)
PCO2 TEMP ADJ BLDV: 36.2 MMHG (ref 41–51)
PH BLDV: 7.27 [PH] (ref 7.31–7.45)
PH BLDV: 7.29 [PH] (ref 7.31–7.45)
PH TEMP ADJ BLDV: 7.27 [PH] (ref 7.31–7.45)
PH TEMP ADJ BLDV: 7.29 [PH] (ref 7.31–7.45)
PHOSPHATE SERPL-MCNC: 2.1 MG/DL (ref 2.5–4.5)
PO2 BLDV: 35 MMHG (ref 25–40)
PO2 BLDV: 38 MMHG (ref 25–40)
PO2 TEMP ADJ BLDV: 36 MMHG (ref 25–40)
PO2 TEMP ADJ BLDV: 38 MMHG (ref 25–40)
POTASSIUM BLD-SCNC: 3.3 MMOL/L (ref 3.6–5.5)
POTASSIUM BLD-SCNC: 4 MMOL/L (ref 3.6–5.5)
POTASSIUM SERPL-SCNC: 3.4 MMOL/L (ref 3.6–5.5)
POTASSIUM SERPL-SCNC: 3.4 MMOL/L (ref 3.6–5.5)
POTASSIUM SERPL-SCNC: 3.6 MMOL/L (ref 3.6–5.5)
POTASSIUM SERPL-SCNC: 3.8 MMOL/L (ref 3.6–5.5)
POTASSIUM SERPL-SCNC: 3.9 MMOL/L (ref 3.6–5.5)
SAO2 % BLDV: 62 %
SAO2 % BLDV: 66 %
SODIUM BLD-SCNC: 144 MMOL/L (ref 135–145)
SODIUM BLD-SCNC: 149 MMOL/L (ref 135–145)
SODIUM SERPL-SCNC: 142 MMOL/L (ref 135–145)
SODIUM SERPL-SCNC: 143 MMOL/L (ref 135–145)
SODIUM SERPL-SCNC: 144 MMOL/L (ref 135–145)
SODIUM SERPL-SCNC: 146 MMOL/L (ref 135–145)
SODIUM SERPL-SCNC: 148 MMOL/L (ref 135–145)
SPECIMEN DRAWN FROM PATIENT: ABNORMAL
SPECIMEN DRAWN FROM PATIENT: ABNORMAL

## 2017-08-10 PROCEDURE — 770022 HCHG ROOM/CARE - ICU (200)

## 2017-08-10 PROCEDURE — 80048 BASIC METABOLIC PNL TOTAL CA: CPT

## 2017-08-10 PROCEDURE — 85014 HEMATOCRIT: CPT | Mod: 91

## 2017-08-10 PROCEDURE — A9270 NON-COVERED ITEM OR SERVICE: HCPCS | Performed by: INTERNAL MEDICINE

## 2017-08-10 PROCEDURE — 82803 BLOOD GASES ANY COMBINATION: CPT | Mod: 91

## 2017-08-10 PROCEDURE — 82330 ASSAY OF CALCIUM: CPT | Mod: 91

## 2017-08-10 PROCEDURE — 84132 ASSAY OF SERUM POTASSIUM: CPT | Mod: 91

## 2017-08-10 PROCEDURE — 700111 HCHG RX REV CODE 636 W/ 250 OVERRIDE (IP): Performed by: HOSPITALIST

## 2017-08-10 PROCEDURE — 83735 ASSAY OF MAGNESIUM: CPT

## 2017-08-10 PROCEDURE — 700102 HCHG RX REV CODE 250 W/ 637 OVERRIDE(OP): Performed by: INTERNAL MEDICINE

## 2017-08-10 PROCEDURE — 99291 CRITICAL CARE FIRST HOUR: CPT | Performed by: HOSPITALIST

## 2017-08-10 PROCEDURE — 84295 ASSAY OF SERUM SODIUM: CPT

## 2017-08-10 PROCEDURE — 82962 GLUCOSE BLOOD TEST: CPT | Mod: 91

## 2017-08-10 PROCEDURE — 700111 HCHG RX REV CODE 636 W/ 250 OVERRIDE (IP): Performed by: INTERNAL MEDICINE

## 2017-08-10 PROCEDURE — 700105 HCHG RX REV CODE 258: Performed by: INTERNAL MEDICINE

## 2017-08-10 PROCEDURE — 84100 ASSAY OF PHOSPHORUS: CPT

## 2017-08-10 PROCEDURE — 700105 HCHG RX REV CODE 258: Performed by: HOSPITALIST

## 2017-08-10 PROCEDURE — 700102 HCHG RX REV CODE 250 W/ 637 OVERRIDE(OP): Performed by: HOSPITALIST

## 2017-08-10 PROCEDURE — 700105 HCHG RX REV CODE 258: Performed by: FAMILY MEDICINE

## 2017-08-10 RX ORDER — DEXTROSE MONOHYDRATE 100 MG/ML
INJECTION, SOLUTION INTRAVENOUS CONTINUOUS
Status: DISCONTINUED | OUTPATIENT
Start: 2017-08-10 | End: 2017-08-11

## 2017-08-10 RX ORDER — POTASSIUM CHLORIDE 29.8 MG/ML
40 INJECTION INTRAVENOUS ONCE
Status: COMPLETED | OUTPATIENT
Start: 2017-08-10 | End: 2017-08-10

## 2017-08-10 RX ORDER — POTASSIUM CHLORIDE 7.45 MG/ML
10 INJECTION INTRAVENOUS ONCE
Status: COMPLETED | OUTPATIENT
Start: 2017-08-10 | End: 2017-08-10

## 2017-08-10 RX ORDER — POTASSIUM CHLORIDE 14.9 MG/ML
20 INJECTION INTRAVENOUS ONCE
Status: COMPLETED | OUTPATIENT
Start: 2017-08-10 | End: 2017-08-10

## 2017-08-10 RX ADMIN — METOCLOPRAMIDE 10 MG: 5 INJECTION, SOLUTION INTRAMUSCULAR; INTRAVENOUS at 15:26

## 2017-08-10 RX ADMIN — DEXTROSE MONOHYDRATE: 100 INJECTION, SOLUTION INTRAVENOUS at 18:18

## 2017-08-10 RX ADMIN — DEXTROSE AND SODIUM CHLORIDE: 10; .45 INJECTION, SOLUTION INTRAVENOUS at 02:14

## 2017-08-10 RX ADMIN — POTASSIUM CHLORIDE 40 MEQ: 29.8 INJECTION, SOLUTION INTRAVENOUS at 02:03

## 2017-08-10 RX ADMIN — POTASSIUM CHLORIDE 10 MEQ: 10 INJECTION, SOLUTION INTRAVENOUS at 10:57

## 2017-08-10 RX ADMIN — ATORVASTATIN CALCIUM 20 MG: 20 TABLET, FILM COATED ORAL at 21:23

## 2017-08-10 RX ADMIN — STANDARDIZED SENNA CONCENTRATE AND DOCUSATE SODIUM 2 TABLET: 8.6; 5 TABLET, FILM COATED ORAL at 21:23

## 2017-08-10 RX ADMIN — ENOXAPARIN SODIUM 40 MG: 100 INJECTION SUBCUTANEOUS at 08:20

## 2017-08-10 RX ADMIN — POTASSIUM CHLORIDE 20 MEQ: 14.9 INJECTION, SOLUTION INTRAVENOUS at 21:24

## 2017-08-10 RX ADMIN — MORPHINE SULFATE 2 MG: 4 INJECTION INTRAVENOUS at 08:29

## 2017-08-10 RX ADMIN — ONDANSETRON 4 MG: 2 INJECTION INTRAMUSCULAR; INTRAVENOUS at 05:31

## 2017-08-10 RX ADMIN — METOCLOPRAMIDE 10 MG: 5 INJECTION, SOLUTION INTRAMUSCULAR; INTRAVENOUS at 08:20

## 2017-08-10 RX ADMIN — SODIUM CHLORIDE 4 UNITS/HR: 9 INJECTION, SOLUTION INTRAVENOUS at 14:07

## 2017-08-10 RX ADMIN — POTASSIUM CHLORIDE 20 MEQ: 200 INJECTION, SOLUTION INTRAVENOUS at 06:13

## 2017-08-10 RX ADMIN — MORPHINE SULFATE 2 MG: 4 INJECTION INTRAVENOUS at 05:31

## 2017-08-10 RX ADMIN — DEXTROSE MONOHYDRATE: 100 INJECTION, SOLUTION INTRAVENOUS at 08:36

## 2017-08-10 RX ADMIN — SODIUM CHLORIDE 3 UNITS/HR: 9 INJECTION, SOLUTION INTRAVENOUS at 08:36

## 2017-08-10 RX ADMIN — SODIUM CHLORIDE: 9 INJECTION, SOLUTION INTRAVENOUS at 07:13

## 2017-08-10 RX ADMIN — MORPHINE SULFATE 2 MG: 4 INJECTION INTRAVENOUS at 01:26

## 2017-08-10 RX ADMIN — MORPHINE SULFATE 2 MG: 4 INJECTION INTRAVENOUS at 14:23

## 2017-08-10 RX ADMIN — POTASSIUM CHLORIDE 40 MEQ: 29.8 INJECTION, SOLUTION INTRAVENOUS at 15:26

## 2017-08-10 RX ADMIN — ONDANSETRON 4 MG: 2 INJECTION INTRAMUSCULAR; INTRAVENOUS at 00:15

## 2017-08-10 RX ADMIN — METOCLOPRAMIDE 10 MG: 5 INJECTION, SOLUTION INTRAMUSCULAR; INTRAVENOUS at 21:23

## 2017-08-10 ASSESSMENT — PAIN SCALES - GENERAL
PAINLEVEL_OUTOF10: 5
PAINLEVEL_OUTOF10: 5
PAINLEVEL_OUTOF10: 4
PAINLEVEL_OUTOF10: 3
PAINLEVEL_OUTOF10: 0
PAINLEVEL_OUTOF10: 4
PAINLEVEL_OUTOF10: 3
PAINLEVEL_OUTOF10: 3
PAINLEVEL_OUTOF10: 2
PAINLEVEL_OUTOF10: 5
PAINLEVEL_OUTOF10: 2
PAINLEVEL_OUTOF10: 5
PAINLEVEL_OUTOF10: 3
PAINLEVEL_OUTOF10: 3
PAINLEVEL_OUTOF10: 0

## 2017-08-10 ASSESSMENT — ENCOUNTER SYMPTOMS
COUGH: 0
PALPITATIONS: 0
DIAPHORESIS: 0
HEMOPTYSIS: 0
WEAKNESS: 0

## 2017-08-10 ASSESSMENT — LIFESTYLE VARIABLES
ALCOHOL_USE: NO
EVER_SMOKED: NEVER

## 2017-08-10 NOTE — CARE PLAN
Problem: Safety  Goal: Will remain free from falls  Intervention: Assess risk factors for falls  Bedalarm activated, room near nursing station and pt educated on fall prevention measures.      Problem: Pain Management  Goal: Pain level will decrease to patient’s comfort goal  Intervention: Follow pain managment plan developed in collaboration with patient and Interdisciplinary Team  Pt complains of LLQ abd pain.  PRN medications used to ensure adequate pain control.

## 2017-08-10 NOTE — PROGRESS NOTES
Discussed pt's FSBG and lab results during rounds.      Order received to re-start the insulin drip at 3 units/hr and D10 @100ml/hr.  Also push 0800 BMP to 1000 and continue Q4hrs from 1000.

## 2017-08-10 NOTE — CARE PLAN
Problem: Safety  Goal: Will remain free from injury  Instructed pt to call if she needs any assistance, pt agrees    Problem: Pain Management  Goal: Pain level will decrease to patient’s comfort goal  Will continue to assess pt's pain levels Q2 hrs and PRN, pt states that interventions are adequate at the moment

## 2017-08-10 NOTE — PROGRESS NOTES
Pt transferred to S130 on ICU monitor with CCT and ICU RN. Bedside report given to Marycarmen CHAVIRA.

## 2017-08-10 NOTE — PROGRESS NOTES
[facility] Hospitalist Progress Note    Date of Service: 8/10/2017    Chief Complaint  27 y.o. female admitted 2017 with abdominal pain found to be in DKA    Interval Problem Update  She is feeling a little better, less fatigue and malaise  Continues to have abdominal pain, bilateral upper quadrants  Lethargy noted yesterday significantly improved  Overnight was hypoglycemic, insulin drip stopped, restarted this morning    Consultants/Specialty  None    Disposition  She can likely be discharged home after DKA is resolved    Patient remains critically ill in the ICU with life threatening DKA requiring management of insulin drip that is titrated base on serial lab value results to prevent renal collapse. Total of 40 minutes of critical care time spent with patient, discussed on rounds with RN, pharmacy, and RT. This time is exclusive of any procedures performed and does not overlap with other physician's time.    Review of Systems   Constitutional: Positive for malaise/fatigue. Negative for diaphoresis.   Respiratory: Negative for cough and hemoptysis.    Cardiovascular: Negative for chest pain and palpitations.   Neurological: Negative for weakness.      Physical Exam  Laboratory/Imaging   Hemodynamics  No data recorded.   Monitored Temp: 37.1 °C (98.8 °F)  Pulse  Av.5  Min: 96  Max: 145 Heart Rate (Monitored): 96  NIBP: 107/64 mmHg     Respiratory      Respiration: 15, Pulse Oximetry: 96 %        RUL Breath Sounds: Clear, RML Breath Sounds: Clear, RLL Breath Sounds: Clear, NANCY Breath Sounds: Clear, LLL Breath Sounds: Clear    Fluids    Intake/Output Summary (Last 24 hours) at 08/10/17 1620  Last data filed at 08/10/17 1400   Gross per 24 hour   Intake 3720.16 ml   Output   2165 ml   Net 1555.16 ml       Nutrition  Orders Placed This Encounter   Procedures   • Diet NPO     Standing Status: Standing      Number of Occurrences: 1      Standing Expiration Date:      Order Specific Question:  Restrict to:      Answer:  Sips with Medications [3]      Comments:  Ice chips ok     Physical Exam   Constitutional: She is oriented to person, place, and time. She appears well-nourished. No distress.   Eyes: Conjunctivae are normal. Right eye exhibits no discharge. Left eye exhibits no discharge.   Cardiovascular: Intact distal pulses.    No murmur heard.  tachycardic   Pulmonary/Chest: Effort normal and breath sounds normal. No respiratory distress. She has no wheezes.   Abdominal: Soft. Bowel sounds are normal. She exhibits no distension and no mass. There is tenderness. There is no rebound and no guarding.   Musculoskeletal: Normal range of motion. She exhibits no edema.   Neurological: She is alert and oriented to person, place, and time. No cranial nerve deficit.   Skin: Skin is warm and dry. No rash noted. She is not diaphoretic. No erythema.   Nursing note and vitals reviewed.      Recent Labs      08/09/17   0406   WBC  9.9   RBC  5.17   HEMOGLOBIN  15.0   HEMATOCRIT  46.0   MCV  89.0   MCH  29.0   MCHC  32.6*   RDW  46.6   PLATELETCT  248   MPV  11.9     Recent Labs      08/10/17   0015  08/10/17   0420  08/10/17   0955   SODIUM  146*  148*  142   POTASSIUM  3.4*  3.6  3.8   CHLORIDE  126*  127*  123*   CO2  12*  15*  10*   GLUCOSE  110*  102*  235*   BUN  17  16  13   CREATININE  0.67  0.59  0.53   CALCIUM  7.7*  7.7*  7.9*                      Assessment/Plan     * DKA (diabetic ketoacidoses) (CMS-HCC) (present on admission)  Assessment & Plan  Insulin drip was stopped overnight  Gap has re-opened, she is back in DKA  Likely complicated by some degree of hyperchloremic acidosis  Restart insulin drip, fluids to D10W    Gastroparesis due to DM (CMS-HCC) (present on admission)  Assessment & Plan  Reglan  Glucose control    Type 1 diabetes mellitus (CMS-HCC) (present on admission)  Assessment & Plan  Uncontrolled HbA1c 14.5,  Multiple admissions for DKA  She says she is compliant with medications    Dehydration (present on  admission)  Assessment & Plan  Due to DKA  Fluids    Nausea and vomiting in adult patient (present on admission)  Assessment & Plan  Due to DKA. She also has a history of gastroparesis  -Stated allergy to Compazine and promethazine  -Zofran as needed  -Reglan 3 times a day    Hyperkalemia (present on admission)  Assessment & Plan  Resolved    Labs reviewed and Medications reviewed  Pitt catheter: No Pitt      DVT Prophylaxis: Enoxaparin (Lovenox)

## 2017-08-10 NOTE — PROGRESS NOTES
Pt's FSBG 108 for 1600 check.  Dr. Benítez updated and order received to decrease insulin drip to 1.5 units/hr.

## 2017-08-10 NOTE — CARE PLAN
Problem: Safety  Goal: Will remain free from injury  Outcome: PROGRESSING AS EXPECTED  Discussed use of call light, getting assistance for mobility, hazards of lines/tubes, pt agrees.     Problem: Knowledge Deficit  Goal: Knowledge of disease process/condition, treatment plan, diagnostic tests, and medications will improve  Outcome: PROGRESSING AS EXPECTED  Educated on current meds & poc.

## 2017-08-10 NOTE — PROGRESS NOTES
Dr. Street paged regarding glucose of 93, co2 10, gap 12    Orders received to increase d10 1/2 ns rate to 125 ML/hr

## 2017-08-10 NOTE — PROGRESS NOTES
Lab's chemistry machine is down, unable to run pt's 1400 BMP.  K and CO2 obtained by ISTAT.  K: 3.3 CO2:18    Dr. Benítez updated, order received to drop insulin drip down to 3 units/hr and replace potassium according to ISTAT result.

## 2017-08-10 NOTE — FLOWSHEET NOTE
08/10/17 1400   Events/Summary/Plan   Events/Summary/Plan A VBG was done.  RN collected  from an IJ.  Lab is down at this time.   General Vent Information   Pulse Oximetry 96 %   Heart Rate (Monitored) 96   Resuscitation Bag Resuscitation Bag and Mask at Bedside and Checked   Mobility Group   Activity Performed Unable to mobilize   Reason Not Mobilized Patient refused, education provided   Blood Gas / Site draw   Blood Gas / Site draw  RN Collected Specimen

## 2017-08-10 NOTE — PROGRESS NOTES
Page sent to dr. gruber to report glucose levels ,100.     Orders received to turn off insulin gtt, continue d10 1/2 NS @ 125/hr x1 hour.  Recheck glucose, then if glucose above 100, stop d10 & change IV fluids to NS @ 150 ML/hr.

## 2017-08-11 LAB
ANION GAP SERPL CALC-SCNC: 5 MMOL/L (ref 0–11.9)
ANION GAP SERPL CALC-SCNC: 6 MMOL/L (ref 0–11.9)
ANION GAP SERPL CALC-SCNC: 7 MMOL/L (ref 0–11.9)
ANION GAP SERPL CALC-SCNC: 7 MMOL/L (ref 0–11.9)
BUN SERPL-MCNC: 7 MG/DL (ref 8–22)
BUN SERPL-MCNC: 8 MG/DL (ref 8–22)
BUN SERPL-MCNC: 8 MG/DL (ref 8–22)
BUN SERPL-MCNC: 9 MG/DL (ref 8–22)
CALCIUM SERPL-MCNC: 8.2 MG/DL (ref 8.5–10.5)
CALCIUM SERPL-MCNC: 8.2 MG/DL (ref 8.5–10.5)
CALCIUM SERPL-MCNC: 8.5 MG/DL (ref 8.5–10.5)
CALCIUM SERPL-MCNC: 8.6 MG/DL (ref 8.5–10.5)
CHLORIDE SERPL-SCNC: 115 MMOL/L (ref 96–112)
CHLORIDE SERPL-SCNC: 116 MMOL/L (ref 96–112)
CHLORIDE SERPL-SCNC: 117 MMOL/L (ref 96–112)
CHLORIDE SERPL-SCNC: 117 MMOL/L (ref 96–112)
CO2 SERPL-SCNC: 16 MMOL/L (ref 20–33)
CO2 SERPL-SCNC: 17 MMOL/L (ref 20–33)
CO2 SERPL-SCNC: 17 MMOL/L (ref 20–33)
CO2 SERPL-SCNC: 18 MMOL/L (ref 20–33)
CREAT SERPL-MCNC: 0.45 MG/DL (ref 0.5–1.4)
CREAT SERPL-MCNC: 0.47 MG/DL (ref 0.5–1.4)
CREAT SERPL-MCNC: 0.53 MG/DL (ref 0.5–1.4)
CREAT SERPL-MCNC: 0.54 MG/DL (ref 0.5–1.4)
GFR SERPL CREATININE-BSD FRML MDRD: >60 ML/MIN/1.73 M 2
GLUCOSE BLD-MCNC: 149 MG/DL (ref 65–99)
GLUCOSE BLD-MCNC: 155 MG/DL (ref 65–99)
GLUCOSE BLD-MCNC: 162 MG/DL (ref 65–99)
GLUCOSE BLD-MCNC: 164 MG/DL (ref 65–99)
GLUCOSE BLD-MCNC: 165 MG/DL (ref 65–99)
GLUCOSE BLD-MCNC: 168 MG/DL (ref 65–99)
GLUCOSE BLD-MCNC: 168 MG/DL (ref 65–99)
GLUCOSE BLD-MCNC: 191 MG/DL (ref 65–99)
GLUCOSE BLD-MCNC: 191 MG/DL (ref 65–99)
GLUCOSE BLD-MCNC: 212 MG/DL (ref 65–99)
GLUCOSE BLD-MCNC: 473 MG/DL (ref 65–99)
GLUCOSE SERPL-MCNC: 172 MG/DL (ref 65–99)
GLUCOSE SERPL-MCNC: 182 MG/DL (ref 65–99)
GLUCOSE SERPL-MCNC: 209 MG/DL (ref 65–99)
GLUCOSE SERPL-MCNC: 236 MG/DL (ref 65–99)
HCT VFR BLD CALC: 31 % (ref 37–47)
HGB BLD-MCNC: 10.5 G/DL (ref 12–16)
POTASSIUM SERPL-SCNC: 3.7 MMOL/L (ref 3.6–5.5)
POTASSIUM SERPL-SCNC: 3.7 MMOL/L (ref 3.6–5.5)
POTASSIUM SERPL-SCNC: 3.8 MMOL/L (ref 3.6–5.5)
POTASSIUM SERPL-SCNC: 3.9 MMOL/L (ref 3.6–5.5)
SODIUM SERPL-SCNC: 137 MMOL/L (ref 135–145)
SODIUM SERPL-SCNC: 140 MMOL/L (ref 135–145)
SODIUM SERPL-SCNC: 140 MMOL/L (ref 135–145)
SODIUM SERPL-SCNC: 141 MMOL/L (ref 135–145)

## 2017-08-11 PROCEDURE — 700111 HCHG RX REV CODE 636 W/ 250 OVERRIDE (IP): Performed by: INTERNAL MEDICINE

## 2017-08-11 PROCEDURE — 82962 GLUCOSE BLOOD TEST: CPT | Mod: 91

## 2017-08-11 PROCEDURE — 700105 HCHG RX REV CODE 258: Performed by: HOSPITALIST

## 2017-08-11 PROCEDURE — A9270 NON-COVERED ITEM OR SERVICE: HCPCS | Performed by: INTERNAL MEDICINE

## 2017-08-11 PROCEDURE — 700102 HCHG RX REV CODE 250 W/ 637 OVERRIDE(OP): Performed by: HOSPITALIST

## 2017-08-11 PROCEDURE — 700111 HCHG RX REV CODE 636 W/ 250 OVERRIDE (IP): Performed by: HOSPITALIST

## 2017-08-11 PROCEDURE — 700102 HCHG RX REV CODE 250 W/ 637 OVERRIDE(OP): Performed by: INTERNAL MEDICINE

## 2017-08-11 PROCEDURE — 770006 HCHG ROOM/CARE - MED/SURG/GYN SEMI*

## 2017-08-11 PROCEDURE — 99291 CRITICAL CARE FIRST HOUR: CPT | Performed by: HOSPITALIST

## 2017-08-11 PROCEDURE — A9270 NON-COVERED ITEM OR SERVICE: HCPCS | Performed by: HOSPITALIST

## 2017-08-11 PROCEDURE — 80048 BASIC METABOLIC PNL TOTAL CA: CPT

## 2017-08-11 RX ORDER — POTASSIUM CHLORIDE 14.9 MG/ML
20 INJECTION INTRAVENOUS ONCE
Status: COMPLETED | OUTPATIENT
Start: 2017-08-11 | End: 2017-08-11

## 2017-08-11 RX ORDER — DEXTROSE MONOHYDRATE 50 MG/ML
INJECTION, SOLUTION INTRAVENOUS CONTINUOUS
Status: ACTIVE | OUTPATIENT
Start: 2017-08-11 | End: 2017-08-11

## 2017-08-11 RX ORDER — POTASSIUM CHLORIDE 20 MEQ/1
20 TABLET, EXTENDED RELEASE ORAL DAILY
Status: DISCONTINUED | OUTPATIENT
Start: 2017-08-11 | End: 2017-08-13 | Stop reason: HOSPADM

## 2017-08-11 RX ORDER — INSULIN GLARGINE 100 [IU]/ML
10 INJECTION, SOLUTION SUBCUTANEOUS 2 TIMES DAILY
Status: DISCONTINUED | OUTPATIENT
Start: 2017-08-11 | End: 2017-08-12

## 2017-08-11 RX ORDER — ACETAMINOPHEN 325 MG/1
650 TABLET ORAL EVERY 6 HOURS PRN
Status: DISCONTINUED | OUTPATIENT
Start: 2017-08-11 | End: 2017-08-13 | Stop reason: HOSPADM

## 2017-08-11 RX ADMIN — ATORVASTATIN CALCIUM 20 MG: 20 TABLET, FILM COATED ORAL at 20:33

## 2017-08-11 RX ADMIN — INSULIN LISPRO 8 UNITS: 100 INJECTION, SOLUTION INTRAVENOUS; SUBCUTANEOUS at 20:33

## 2017-08-11 RX ADMIN — INSULIN LISPRO 2 UNITS: 100 INJECTION, SOLUTION INTRAVENOUS; SUBCUTANEOUS at 11:11

## 2017-08-11 RX ADMIN — POTASSIUM CHLORIDE 20 MEQ: 1500 TABLET, EXTENDED RELEASE ORAL at 15:08

## 2017-08-11 RX ADMIN — METOCLOPRAMIDE 10 MG: 5 INJECTION, SOLUTION INTRAMUSCULAR; INTRAVENOUS at 20:32

## 2017-08-11 RX ADMIN — METOCLOPRAMIDE 10 MG: 5 INJECTION, SOLUTION INTRAMUSCULAR; INTRAVENOUS at 15:08

## 2017-08-11 RX ADMIN — ACETAMINOPHEN 650 MG: 325 TABLET, FILM COATED ORAL at 16:28

## 2017-08-11 RX ADMIN — DEXTROSE MONOHYDRATE: 100 INJECTION, SOLUTION INTRAVENOUS at 05:54

## 2017-08-11 RX ADMIN — INSULIN GLARGINE 10 UNITS: 100 INJECTION, SOLUTION SUBCUTANEOUS at 09:20

## 2017-08-11 RX ADMIN — METOCLOPRAMIDE 10 MG: 5 INJECTION, SOLUTION INTRAMUSCULAR; INTRAVENOUS at 09:19

## 2017-08-11 RX ADMIN — ENOXAPARIN SODIUM 40 MG: 100 INJECTION SUBCUTANEOUS at 09:18

## 2017-08-11 RX ADMIN — POTASSIUM CHLORIDE 20 MEQ: 200 INJECTION, SOLUTION INTRAVENOUS at 05:43

## 2017-08-11 RX ADMIN — STANDARDIZED SENNA CONCENTRATE AND DOCUSATE SODIUM 2 TABLET: 8.6; 5 TABLET, FILM COATED ORAL at 09:21

## 2017-08-11 RX ADMIN — POTASSIUM CHLORIDE 20 MEQ: 14.9 INJECTION, SOLUTION INTRAVENOUS at 01:53

## 2017-08-11 RX ADMIN — INSULIN LISPRO 9 UNITS: 100 INJECTION, SOLUTION INTRAVENOUS; SUBCUTANEOUS at 17:33

## 2017-08-11 RX ADMIN — DEXTROSE MONOHYDRATE: 50 INJECTION, SOLUTION INTRAVENOUS at 09:16

## 2017-08-11 RX ADMIN — INSULIN GLARGINE 10 UNITS: 100 INJECTION, SOLUTION SUBCUTANEOUS at 20:33

## 2017-08-11 ASSESSMENT — ENCOUNTER SYMPTOMS
HEMOPTYSIS: 0
COUGH: 0
DIAPHORESIS: 0
PALPITATIONS: 0
WEAKNESS: 0

## 2017-08-11 ASSESSMENT — PAIN SCALES - GENERAL
PAINLEVEL_OUTOF10: 4
PAINLEVEL_OUTOF10: 5
PAINLEVEL_OUTOF10: 0
PAINLEVEL_OUTOF10: 2
PAINLEVEL_OUTOF10: 3
PAINLEVEL_OUTOF10: 2
PAINLEVEL_OUTOF10: 0
PAINLEVEL_OUTOF10: 0
PAINLEVEL_OUTOF10: 7
PAINLEVEL_OUTOF10: 4
PAINLEVEL_OUTOF10: 2

## 2017-08-11 NOTE — CARE PLAN
Problem: Venous Thromboembolism (VTW)/Deep Vein Thrombosis (DVT) Prevention:  Goal: Patient will participate in Venous Thrombosis (VTE)/Deep Vein Thrombosis (DVT)Prevention Measures  Outcome: PROGRESSING AS EXPECTED  scds on/in use, refusing mobility, performs rom in bed    Problem: Knowledge Deficit  Goal: Knowledge of disease process/condition, treatment plan, diagnostic tests, and medications will improve  Outcome: PROGRESSING AS EXPECTED  Discussed disease process, iv insulin/fluids, transitioning to long-acting insulin, frequent lab work.

## 2017-08-11 NOTE — PROGRESS NOTES
[facility] Hospitalist Progress Note    Date of Service: 2017    Chief Complaint  27 y.o. female admitted 2017 with abdominal pain found to be in DKA    Interval Problem Update  Insulin drip titrated overnight and this morning  Blood sugars 100-200's  Good UOP  Patient complains of abdominal discomfort  Overall better  Previous malaise has significantly improved    Consultants/Specialty  None    Disposition  She can likely be discharged home after DKA is resolved    Patient remains critically ill in the ICU with life threatening DKA requiring management of insulin drip that is titrated base on serial lab value results to prevent renal collapse. Total of 35 minutes of critical care time spent with patient, discussed on rounds with RN, pharmacy, and RT. This time is exclusive of any procedures performed and does not overlap with other physician's time.    Review of Systems   Constitutional: Positive for malaise/fatigue. Negative for diaphoresis.   Respiratory: Negative for cough and hemoptysis.    Cardiovascular: Negative for chest pain and palpitations.   Skin: Negative for itching and rash.   Neurological: Negative for weakness.      Physical Exam  Laboratory/Imaging   Hemodynamics  No data recorded.   Monitored Temp: 36.9 °C (98.4 °F)  Pulse  Av  Min: 84  Max: 145 Heart Rate (Monitored): 90  NIBP: 110/69 mmHg     Respiratory      Respiration: 16, Pulse Oximetry: 98 %        RUL Breath Sounds: Clear, RML Breath Sounds: Clear, RLL Breath Sounds: Clear, NANCY Breath Sounds: Clear, LLL Breath Sounds: Clear    Fluids    Intake/Output Summary (Last 24 hours) at 17 0807  Last data filed at 17 0600   Gross per 24 hour   Intake 2900.16 ml   Output   1330 ml   Net 1570.16 ml       Nutrition  Orders Placed This Encounter   Procedures   • DIET ORDER     Standing Status: Standing      Number of Occurrences: 1      Standing Expiration Date:      Order Specific Question:  Diet:     Answer:  Regular [1]      Order Specific Question:  Diet:     Answer:  Diabetic [3]     Physical Exam   Constitutional: She is oriented to person, place, and time. She appears well-nourished. No distress.   HENT:   Head: Normocephalic and atraumatic.   Eyes: Conjunctivae and EOM are normal. No scleral icterus.   Neck: Normal range of motion. Neck supple.   Cardiovascular: Intact distal pulses.    No murmur heard.  tachycardic   Pulmonary/Chest: Effort normal and breath sounds normal. No respiratory distress. She has no wheezes.   Abdominal: Soft. Bowel sounds are normal. She exhibits no distension and no mass. There is tenderness. There is no rebound and no guarding.   Musculoskeletal: Normal range of motion. She exhibits no edema.   Neurological: She is alert and oriented to person, place, and time. No cranial nerve deficit.   Skin: Skin is warm and dry. No rash noted. She is not diaphoretic. No erythema.   Nursing note and vitals reviewed.      Recent Labs      08/09/17   0406   WBC  9.9   RBC  5.17   HEMOGLOBIN  15.0   HEMATOCRIT  46.0   MCV  89.0   MCH  29.0   MCHC  32.6*   RDW  46.6   PLATELETCT  248   MPV  11.9     Recent Labs      08/10/17   2000  08/11/17   0040  08/11/17   0400   SODIUM  144  140  140   POTASSIUM  3.9  3.7  3.7   CHLORIDE  119*  116*  117*   CO2  17*  17*  16*   GLUCOSE  172*  209*  182*   BUN  11  9  8   CREATININE  0.46*  0.53  0.47*   CALCIUM  7.9*  8.2*  8.2*                      Assessment/Plan     * DKA (diabetic ketoacidoses) (CMS-HCC) (present on admission)  Assessment & Plan  Continue insulin drip for 1 hour  Start BID lantus and sliding scale  Encourage PO intake, change fluids to D5, keep fluids going until she is taking adequate PO    Gastroparesis due to DM (CMS-HCC) (present on admission)  Assessment & Plan  Reglan  Glucose control    Type 1 diabetes mellitus (CMS-HCC) (present on admission)  Assessment & Plan  Uncontrolled HbA1c 14.5,  Multiple admissions for DKA  She says she is compliant with  medications    Dehydration (present on admission)  Assessment & Plan  Improved    Nausea and vomiting in adult patient (present on admission)  Assessment & Plan  Continue scheduled reglan and IV PRN meds    Hyperkalemia (present on admission)  Assessment & Plan  Resolved      Labs reviewed and Medications reviewed  Pitt catheter: No Pitt      DVT Prophylaxis: Enoxaparin (Lovenox)

## 2017-08-11 NOTE — DIETARY
Nutrition Services:    Diabetes Education. Current ratio is 3 gm carb to 1 unit of insulin at home per patient.  She stated this regimen was started in the last 2-3 months.  She stated she takes Lantus BID.  She attributes the lack of blood sugar control to an erratic work schedule and meal times.  She also reported gastroparesis and better success with small frequent meals.   She stated she has been counting carbs for many years and feels comfortable with this.  She wanted carb grams on meal tickets which we can do.    Rec/Plan:  Diabetes Educator to aid in insulin management and education.   Will add three snacks per day and print carb grams on meal tickets.  RD following.

## 2017-08-11 NOTE — CARE PLAN
Problem: Safety  Goal: Will remain free from injury  Patient assessed for risk to fall and safety measures implemented. Educated on importance of using call light and calling for assistance. Bed rails up, call light within reach.     Problem: Discharge Barriers/Planning  Goal: Patient’s continuum of care needs will be met  Patient transitioning from IV insulin to subcutaneous, discussions to discharge patient to home once blood sugars stabilize. Dr. Benítez to ensure patient has proper prescriptions and access to insulin once discharged.

## 2017-08-11 NOTE — PROGRESS NOTES
Report given to CAIT Phelps. Patient to be transported to [place] [id number] with transport and belongings.

## 2017-08-12 LAB
ANION GAP SERPL CALC-SCNC: 8 MMOL/L (ref 0–11.9)
BASOPHILS # BLD AUTO: 0.8 % (ref 0–1.8)
BASOPHILS # BLD: 0.03 K/UL (ref 0–0.12)
BUN SERPL-MCNC: 14 MG/DL (ref 8–22)
CALCIUM SERPL-MCNC: 8.9 MG/DL (ref 8.5–10.5)
CHLORIDE SERPL-SCNC: 110 MMOL/L (ref 96–112)
CO2 SERPL-SCNC: 19 MMOL/L (ref 20–33)
CREAT SERPL-MCNC: 0.57 MG/DL (ref 0.5–1.4)
EOSINOPHIL # BLD AUTO: 0.14 K/UL (ref 0–0.51)
EOSINOPHIL NFR BLD: 3.8 % (ref 0–6.9)
ERYTHROCYTE [DISTWIDTH] IN BLOOD BY AUTOMATED COUNT: 46.3 FL (ref 35.9–50)
GFR SERPL CREATININE-BSD FRML MDRD: >60 ML/MIN/1.73 M 2
GLUCOSE BLD-MCNC: 292 MG/DL (ref 65–99)
GLUCOSE BLD-MCNC: 330 MG/DL (ref 65–99)
GLUCOSE BLD-MCNC: 341 MG/DL (ref 65–99)
GLUCOSE BLD-MCNC: 352 MG/DL (ref 65–99)
GLUCOSE BLD-MCNC: 368 MG/DL (ref 65–99)
GLUCOSE SERPL-MCNC: 262 MG/DL (ref 65–99)
HCT VFR BLD AUTO: 36.9 % (ref 37–47)
HGB BLD-MCNC: 12.4 G/DL (ref 12–16)
IMM GRANULOCYTES # BLD AUTO: 0.02 K/UL (ref 0–0.11)
IMM GRANULOCYTES NFR BLD AUTO: 0.5 % (ref 0–0.9)
LYMPHOCYTES # BLD AUTO: 1.54 K/UL (ref 1–4.8)
LYMPHOCYTES NFR BLD: 41.3 % (ref 22–41)
MCH RBC QN AUTO: 28.7 PG (ref 27–33)
MCHC RBC AUTO-ENTMCNC: 33.6 G/DL (ref 33.6–35)
MCV RBC AUTO: 85.4 FL (ref 81.4–97.8)
MONOCYTES # BLD AUTO: 0.33 K/UL (ref 0–0.85)
MONOCYTES NFR BLD AUTO: 8.8 % (ref 0–13.4)
NEUTROPHILS # BLD AUTO: 1.67 K/UL (ref 2–7.15)
NEUTROPHILS NFR BLD: 44.8 % (ref 44–72)
NRBC # BLD AUTO: 0 K/UL
NRBC BLD AUTO-RTO: 0 /100 WBC
PLATELET # BLD AUTO: 165 K/UL (ref 164–446)
PMV BLD AUTO: 9.8 FL (ref 9–12.9)
POTASSIUM SERPL-SCNC: 4.2 MMOL/L (ref 3.6–5.5)
RBC # BLD AUTO: 4.32 M/UL (ref 4.2–5.4)
SODIUM SERPL-SCNC: 137 MMOL/L (ref 135–145)
WBC # BLD AUTO: 3.7 K/UL (ref 4.8–10.8)

## 2017-08-12 PROCEDURE — 770006 HCHG ROOM/CARE - MED/SURG/GYN SEMI*

## 2017-08-12 PROCEDURE — 80048 BASIC METABOLIC PNL TOTAL CA: CPT

## 2017-08-12 PROCEDURE — 36415 COLL VENOUS BLD VENIPUNCTURE: CPT

## 2017-08-12 PROCEDURE — A9270 NON-COVERED ITEM OR SERVICE: HCPCS | Performed by: HOSPITALIST

## 2017-08-12 PROCEDURE — 700102 HCHG RX REV CODE 250 W/ 637 OVERRIDE(OP): Performed by: HOSPITALIST

## 2017-08-12 PROCEDURE — A9270 NON-COVERED ITEM OR SERVICE: HCPCS | Performed by: INTERNAL MEDICINE

## 2017-08-12 PROCEDURE — 99232 SBSQ HOSP IP/OBS MODERATE 35: CPT | Performed by: INTERNAL MEDICINE

## 2017-08-12 PROCEDURE — 700111 HCHG RX REV CODE 636 W/ 250 OVERRIDE (IP): Performed by: INTERNAL MEDICINE

## 2017-08-12 PROCEDURE — 700102 HCHG RX REV CODE 250 W/ 637 OVERRIDE(OP): Performed by: INTERNAL MEDICINE

## 2017-08-12 PROCEDURE — 85025 COMPLETE CBC W/AUTO DIFF WBC: CPT

## 2017-08-12 PROCEDURE — 82962 GLUCOSE BLOOD TEST: CPT | Mod: 91

## 2017-08-12 RX ORDER — INSULIN GLARGINE 100 [IU]/ML
25 INJECTION, SOLUTION SUBCUTANEOUS 2 TIMES DAILY
Status: DISCONTINUED | OUTPATIENT
Start: 2017-08-12 | End: 2017-08-13 | Stop reason: HOSPADM

## 2017-08-12 RX ADMIN — INSULIN LISPRO 6 UNITS: 100 INJECTION, SOLUTION INTRAVENOUS; SUBCUTANEOUS at 17:13

## 2017-08-12 RX ADMIN — INSULIN GLARGINE 10 UNITS: 100 INJECTION, SOLUTION SUBCUTANEOUS at 07:53

## 2017-08-12 RX ADMIN — METOCLOPRAMIDE 10 MG: 5 INJECTION, SOLUTION INTRAMUSCULAR; INTRAVENOUS at 20:45

## 2017-08-12 RX ADMIN — INSULIN LISPRO 8 UNITS: 100 INJECTION, SOLUTION INTRAVENOUS; SUBCUTANEOUS at 06:21

## 2017-08-12 RX ADMIN — STANDARDIZED SENNA CONCENTRATE AND DOCUSATE SODIUM 2 TABLET: 8.6; 5 TABLET, FILM COATED ORAL at 07:48

## 2017-08-12 RX ADMIN — METOCLOPRAMIDE 10 MG: 5 INJECTION, SOLUTION INTRAMUSCULAR; INTRAVENOUS at 07:48

## 2017-08-12 RX ADMIN — INSULIN LISPRO 5 UNITS: 100 INJECTION, SOLUTION INTRAVENOUS; SUBCUTANEOUS at 20:51

## 2017-08-12 RX ADMIN — STANDARDIZED SENNA CONCENTRATE AND DOCUSATE SODIUM 2 TABLET: 8.6; 5 TABLET, FILM COATED ORAL at 20:43

## 2017-08-12 RX ADMIN — INSULIN GLARGINE 25 UNITS: 100 INJECTION, SOLUTION SUBCUTANEOUS at 20:50

## 2017-08-12 RX ADMIN — INSULIN LISPRO 6 UNITS: 100 INJECTION, SOLUTION INTRAVENOUS; SUBCUTANEOUS at 11:22

## 2017-08-12 RX ADMIN — POTASSIUM CHLORIDE 20 MEQ: 1500 TABLET, EXTENDED RELEASE ORAL at 07:48

## 2017-08-12 RX ADMIN — ATORVASTATIN CALCIUM 20 MG: 20 TABLET, FILM COATED ORAL at 20:43

## 2017-08-12 RX ADMIN — ENOXAPARIN SODIUM 40 MG: 100 INJECTION SUBCUTANEOUS at 07:48

## 2017-08-12 RX ADMIN — METOCLOPRAMIDE 10 MG: 5 INJECTION, SOLUTION INTRAMUSCULAR; INTRAVENOUS at 14:26

## 2017-08-12 ASSESSMENT — ENCOUNTER SYMPTOMS
WEAKNESS: 0
NAUSEA: 0
VOMITING: 0
DIAPHORESIS: 0
COUGH: 0
ABDOMINAL PAIN: 0
HEMOPTYSIS: 0
PALPITATIONS: 0

## 2017-08-12 ASSESSMENT — PAIN SCALES - GENERAL
PAINLEVEL_OUTOF10: 0

## 2017-08-12 ASSESSMENT — PATIENT HEALTH QUESTIONNAIRE - PHQ9
SUM OF ALL RESPONSES TO PHQ9 QUESTIONS 1 AND 2: 0
2. FEELING DOWN, DEPRESSED, IRRITABLE, OR HOPELESS: NOT AT ALL
SUM OF ALL RESPONSES TO PHQ QUESTIONS 1-9: 0
1. LITTLE INTEREST OR PLEASURE IN DOING THINGS: NOT AT ALL

## 2017-08-12 ASSESSMENT — LIFESTYLE VARIABLES: DO YOU DRINK ALCOHOL: NO

## 2017-08-12 NOTE — PROGRESS NOTES
Pt resting in bed. AOX4. Morning meds given per MAR. Denies any pain. Pt up to self, steady on feet. Denies any needs at this time, will continue to monitor.

## 2017-08-12 NOTE — PROGRESS NOTES
Patient arrived from [facility] via transport with belongings.   2 RN skin check with Ric RN: L inner forearm rash, L hand closed scab, otherwise skin intact.   Oriented to unit, declined pain or nausea at this time.

## 2017-08-12 NOTE — CARE PLAN
Problem: Communication  Goal: The ability to communicate needs accurately and effectively will improve  Outcome: PROGRESSING AS EXPECTED  Patient communicates needs accurately and effectively.    Problem: Safety  Goal: Will remain free from falls  Outcome: PROGRESSING AS EXPECTED  Patient steady and oriented x4; call light in reach, calls appropriately.

## 2017-08-12 NOTE — PROGRESS NOTES
[facility]ist Progress Note    Date of Service: 2017    Chief Complaint  27 y.o. female admitted 2017 with abdominal pain found to be in DKA    Interval Problem Update  Patient states she takes both sliding scale and LA at home, however, later on tells me she only have little bit of lantus left as her insurance just kicked in.  Otherwise she is feeling great, eating well, and ambulating.     Consultants/Specialty  None    Disposition  BS still in 300, adjusted insulin regimen, probably can go home today once BS are less than 200.     Review of Systems   Constitutional: Negative for malaise/fatigue and diaphoresis.   Respiratory: Negative for cough and hemoptysis.    Cardiovascular: Negative for chest pain and palpitations.   Gastrointestinal: Negative for nausea, vomiting and abdominal pain.   Skin: Negative for itching and rash.   Neurological: Negative for weakness.      Physical Exam  Laboratory/Imaging   Hemodynamics  Temp (24hrs), Av.6 °C (97.8 °F), Min:36.2 °C (97.2 °F), Max:36.9 °C (98.4 °F)   Temperature: 36.9 °C (98.4 °F)  Pulse  Av.3  Min: 81  Max: 145 Heart Rate (Monitored): 97  Blood Pressure: 114/84 mmHg     Respiratory      Respiration: 18, Pulse Oximetry: 98 %        RUL Breath Sounds: Clear, RML Breath Sounds: Clear, RLL Breath Sounds: Clear, NANCY Breath Sounds: Clear, LLL Breath Sounds: Clear    Fluids    Intake/Output Summary (Last 24 hours) at 17 1510  Last data filed at 17 2100   Gross per 24 hour   Intake    120 ml   Output      0 ml   Net    120 ml       Nutrition  Orders Placed This Encounter   Procedures   • DIET ORDER     Standing Status: Standing      Number of Occurrences: 1      Standing Expiration Date:      Order Specific Question:  Diet:     Answer:  Regular [1]     Order Specific Question:  Diet:     Answer:  Diabetic [3]     Physical Exam   Constitutional: She is oriented to person, place, and time. She appears well-nourished. No distress.   HENT:    Head: Normocephalic and atraumatic.   Eyes: Conjunctivae and EOM are normal. No scleral icterus.   Neck: Normal range of motion. Neck supple.   Cardiovascular: Intact distal pulses.    No murmur heard.  Pulmonary/Chest: Effort normal and breath sounds normal. No respiratory distress. She has no wheezes.   Abdominal: Soft. Bowel sounds are normal. She exhibits no distension and no mass. There is no tenderness. There is no rebound and no guarding.   Musculoskeletal: Normal range of motion. She exhibits no edema.   Neurological: She is alert and oriented to person, place, and time. No cranial nerve deficit.   Skin: Skin is warm and dry. No rash noted. She is not diaphoretic. No erythema.   Nursing note and vitals reviewed.      Recent Labs      08/12/17   0505   WBC  3.7*   RBC  4.32   HEMOGLOBIN  12.4   HEMATOCRIT  36.9*   MCV  85.4   MCH  28.7   MCHC  33.6   RDW  46.3   PLATELETCT  165   MPV  9.8     Recent Labs      08/11/17   0755  08/11/17   1135  08/12/17   0125   SODIUM  141  137  137   POTASSIUM  3.9  3.8  4.2   CHLORIDE  117*  115*  110   CO2  18*  17*  19*   GLUCOSE  172*  236*  262*   BUN  8  7*  14   CREATININE  0.45*  0.54  0.57   CALCIUM  8.5  8.6  8.9                      Assessment/Plan     * DKA (diabetic ketoacidoses) (CMS-HCC) (present on admission)  Assessment & Plan  Gap closed, off insulin drip.  On ISS AC and HS.  Was started on lantus 10mg BID.   Per patient she takes 30 unit BID at home. Inc dose to 20 BID.   Monitor blood sugars closely.     Gastroparesis due to DM (CMS-HCC) (present on admission)  Assessment & Plan  Reglan  Glucose control.    Type 1 diabetes mellitus (CMS-HCC) (present on admission)  Assessment & Plan  Uncontrolled HbA1c 14.5,  Multiple admissions for DKA  She says she is compliant with medications but later on tells me she only has little of her lantus at home.     Dehydration (present on admission)  Assessment & Plan  Resolved.     Nausea and vomiting in adult patient  (present on admission)  Assessment & Plan  Resolved.       Hyperkalemia (present on admission)  Assessment & Plan  Resolved      Labs reviewed and Medications reviewed  Pitt catheter: No Pitt      DVT Prophylaxis: Enoxaparin (Lovenox)

## 2017-08-13 VITALS
TEMPERATURE: 97.3 F | WEIGHT: 162.04 LBS | HEIGHT: 67 IN | RESPIRATION RATE: 16 BRPM | HEART RATE: 80 BPM | OXYGEN SATURATION: 97 % | SYSTOLIC BLOOD PRESSURE: 118 MMHG | DIASTOLIC BLOOD PRESSURE: 80 MMHG | BODY MASS INDEX: 25.43 KG/M2

## 2017-08-13 PROBLEM — E87.5 HYPERKALEMIA: Status: RESOLVED | Noted: 2017-08-09 | Resolved: 2017-08-13

## 2017-08-13 PROBLEM — E11.10 DKA (DIABETIC KETOACIDOSES): Status: RESOLVED | Noted: 2017-07-27 | Resolved: 2017-08-13

## 2017-08-13 LAB
ALBUMIN SERPL BCP-MCNC: 3.2 G/DL (ref 3.2–4.9)
ANION GAP SERPL CALC-SCNC: 9 MMOL/L (ref 0–11.9)
BASOPHILS # BLD AUTO: 0.8 % (ref 0–1.8)
BASOPHILS # BLD: 0.04 K/UL (ref 0–0.12)
BUN SERPL-MCNC: 18 MG/DL (ref 8–22)
CALCIUM SERPL-MCNC: 8.9 MG/DL (ref 8.5–10.5)
CHLORIDE SERPL-SCNC: 104 MMOL/L (ref 96–112)
CO2 SERPL-SCNC: 24 MMOL/L (ref 20–33)
CREAT SERPL-MCNC: 0.41 MG/DL (ref 0.5–1.4)
EOSINOPHIL # BLD AUTO: 0.11 K/UL (ref 0–0.51)
EOSINOPHIL NFR BLD: 2.2 % (ref 0–6.9)
ERYTHROCYTE [DISTWIDTH] IN BLOOD BY AUTOMATED COUNT: 46 FL (ref 35.9–50)
GFR SERPL CREATININE-BSD FRML MDRD: >60 ML/MIN/1.73 M 2
GLUCOSE BLD-MCNC: 186 MG/DL (ref 65–99)
GLUCOSE BLD-MCNC: 258 MG/DL (ref 65–99)
GLUCOSE BLD-MCNC: 372 MG/DL (ref 65–99)
GLUCOSE SERPL-MCNC: 233 MG/DL (ref 65–99)
HCT VFR BLD AUTO: 35.9 % (ref 37–47)
HGB BLD-MCNC: 12.1 G/DL (ref 12–16)
IMM GRANULOCYTES # BLD AUTO: 0.01 K/UL (ref 0–0.11)
IMM GRANULOCYTES NFR BLD AUTO: 0.2 % (ref 0–0.9)
LYMPHOCYTES # BLD AUTO: 2.25 K/UL (ref 1–4.8)
LYMPHOCYTES NFR BLD: 44.6 % (ref 22–41)
MCH RBC QN AUTO: 29.4 PG (ref 27–33)
MCHC RBC AUTO-ENTMCNC: 33.7 G/DL (ref 33.6–35)
MCV RBC AUTO: 87.1 FL (ref 81.4–97.8)
MONOCYTES # BLD AUTO: 0.48 K/UL (ref 0–0.85)
MONOCYTES NFR BLD AUTO: 9.5 % (ref 0–13.4)
NEUTROPHILS # BLD AUTO: 2.15 K/UL (ref 2–7.15)
NEUTROPHILS NFR BLD: 42.7 % (ref 44–72)
NRBC # BLD AUTO: 0 K/UL
NRBC BLD AUTO-RTO: 0 /100 WBC
PHOSPHATE SERPL-MCNC: 4.8 MG/DL (ref 2.5–4.5)
PLATELET # BLD AUTO: 163 K/UL (ref 164–446)
PMV BLD AUTO: 9.7 FL (ref 9–12.9)
POTASSIUM SERPL-SCNC: 3.6 MMOL/L (ref 3.6–5.5)
RBC # BLD AUTO: 4.12 M/UL (ref 4.2–5.4)
SODIUM SERPL-SCNC: 137 MMOL/L (ref 135–145)
WBC # BLD AUTO: 5 K/UL (ref 4.8–10.8)

## 2017-08-13 PROCEDURE — 700102 HCHG RX REV CODE 250 W/ 637 OVERRIDE(OP): Performed by: HOSPITALIST

## 2017-08-13 PROCEDURE — 85025 COMPLETE CBC W/AUTO DIFF WBC: CPT

## 2017-08-13 PROCEDURE — 80069 RENAL FUNCTION PANEL: CPT

## 2017-08-13 PROCEDURE — 700111 HCHG RX REV CODE 636 W/ 250 OVERRIDE (IP): Performed by: INTERNAL MEDICINE

## 2017-08-13 PROCEDURE — 700102 HCHG RX REV CODE 250 W/ 637 OVERRIDE(OP): Performed by: INTERNAL MEDICINE

## 2017-08-13 PROCEDURE — 99239 HOSP IP/OBS DSCHRG MGMT >30: CPT | Performed by: INTERNAL MEDICINE

## 2017-08-13 PROCEDURE — 82962 GLUCOSE BLOOD TEST: CPT

## 2017-08-13 PROCEDURE — A9270 NON-COVERED ITEM OR SERVICE: HCPCS | Performed by: HOSPITALIST

## 2017-08-13 PROCEDURE — 36415 COLL VENOUS BLD VENIPUNCTURE: CPT

## 2017-08-13 RX ORDER — ATORVASTATIN CALCIUM 20 MG/1
20 TABLET, FILM COATED ORAL DAILY
Qty: 30 TAB | Refills: 0 | Status: SHIPPED | OUTPATIENT
Start: 2017-08-13 | End: 2019-01-01

## 2017-08-13 RX ORDER — METOCLOPRAMIDE 10 MG/1
10 TABLET ORAL
Qty: 30 TAB | Refills: 0 | Status: SHIPPED | OUTPATIENT
Start: 2017-08-13 | End: 2017-11-07

## 2017-08-13 RX ORDER — INSULIN GLARGINE 100 [IU]/ML
30 INJECTION, SOLUTION SUBCUTANEOUS 2 TIMES DAILY
Qty: 10 ML | Refills: 2 | Status: ON HOLD | OUTPATIENT
Start: 2017-08-13 | End: 2017-11-10

## 2017-08-13 RX ORDER — FERROUS SULFATE 325(65) MG
325 TABLET ORAL DAILY
Qty: 30 TAB | Refills: 0 | Status: SHIPPED | OUTPATIENT
Start: 2017-08-13 | End: 2017-09-20

## 2017-08-13 RX ADMIN — METOCLOPRAMIDE 10 MG: 5 INJECTION, SOLUTION INTRAMUSCULAR; INTRAVENOUS at 08:11

## 2017-08-13 RX ADMIN — INSULIN LISPRO 8 UNITS: 100 INJECTION, SOLUTION INTRAVENOUS; SUBCUTANEOUS at 12:24

## 2017-08-13 RX ADMIN — POTASSIUM CHLORIDE 20 MEQ: 1500 TABLET, EXTENDED RELEASE ORAL at 08:13

## 2017-08-13 RX ADMIN — ENOXAPARIN SODIUM 40 MG: 100 INJECTION SUBCUTANEOUS at 08:13

## 2017-08-13 RX ADMIN — METOCLOPRAMIDE 10 MG: 5 INJECTION, SOLUTION INTRAMUSCULAR; INTRAVENOUS at 15:05

## 2017-08-13 RX ADMIN — INSULIN GLARGINE 25 UNITS: 100 INJECTION, SOLUTION SUBCUTANEOUS at 08:15

## 2017-08-13 RX ADMIN — INSULIN LISPRO 2 UNITS: 100 INJECTION, SOLUTION INTRAVENOUS; SUBCUTANEOUS at 06:01

## 2017-08-13 RX ADMIN — INSULIN LISPRO 5 UNITS: 100 INJECTION, SOLUTION INTRAVENOUS; SUBCUTANEOUS at 15:25

## 2017-08-13 ASSESSMENT — PAIN SCALES - GENERAL: PAINLEVEL_OUTOF10: 0

## 2017-08-13 NOTE — CARE PLAN
Problem: Safety  Goal: Will remain free from injury  Outcome: PROGRESSING AS EXPECTED  Pt has steady gait, knows how to use call light and return demo, denies pain or problems, will call for assistance. Blood Sugar was 292, pt denies symptoms, understands signs and symptoms of DKA, and diabetic episodes will call with any questions or problems. Call light in reach and safety measures in place    Problem: Infection  Goal: Will remain free from infection  Outcome: PROGRESSING AS EXPECTED  Pt encouraged to do hand washing as infection control measure.

## 2017-08-13 NOTE — DISCHARGE INSTRUCTIONS
Discharge Instructions    Discharged to home by taxi with self. Discharged via walking, hospital escort: Refused.  Special equipment needed: Not Applicable    Be sure to schedule a follow-up appointment with your primary care doctor or any specialists as instructed.     Discharge Plan:   Diet Plan: Discussed  Activity Level: Discussed  Confirmed Follow up Appointment: No (Comments) (Followup with PCP if needed. )  Confirmed Symptoms Management: Discussed  Medication Reconciliation Updated: Yes  Influenza Vaccine Indication: Not indicated: Previously immunized this influenza season and > 8 years of age    I understand that a diet low in cholesterol, fat, and sodium is recommended for good health. Unless I have been given specific instructions below for another diet, I accept this instruction as my diet prescription.   Other diet: Diabetic, low sugar. Check your blood sugars before your meals and before bedtime.     Special Instructions: None    · Is patient discharged on Warfarin / Coumadin?   No     · Is patient Post Blood Transfusion?  No    Diabetic Ketoacidosis  Diabetic ketoacidosis is a life-threatening complication of diabetes. If it is not treated, it can cause severe dehydration and organ damage and can lead to a coma or death.  CAUSES  This condition develops when there is not enough of the hormone insulin in the body. Insulin helps the body to break down sugar for energy. Without insulin, the body cannot break down sugar, so it breaks down fats instead. This leads to the production of acids that are called ketones. Ketones are poisonous at high levels.  This condition can be triggered by:  Stress on the body that is brought on by an illness.  Medicines that raise blood glucose levels.  Not taking diabetes medicine.  SYMPTOMS  Symptoms of this condition include:  Fatigue.  Weight loss.  Excessive thirst.  Light-headedness.  Fruity or sweet-smelling breath.  Excessive urination.  Vision changes.  Confusion  or irritability.  Nausea.  Vomiting.  Rapid breathing.  Abdominal pain.  Feeling flushed.  DIAGNOSIS  This condition is diagnosed based on a medical history, a physical exam, and blood tests. You may also have a urine test that checks for ketones.  TREATMENT  This condition may be treated with:  Fluid replacement. This may be done to correct dehydration.  Insulin injections. These may be given through the skin or through an IV tube.  Electrolyte replacement. Electrolytes, such as potassium and sodium, may be given in pill form or through an IV tube.  Antibiotic medicines. These may be prescribed if your condition was caused by an infection.  HOME CARE INSTRUCTIONS  Eating and Drinking  Drink enough fluids to keep your urine clear or pale yellow.  If you cannot eat, alternate between drinking fluids with sugar (such as juice) and salty fluids (such as broth or bouillon).  If you can eat, follow your usual diet and drink sugar-free liquids, such as water.  Other Instructions  Take insulin as directed by your health care provider. Do not skip insulin injections. Do not use  insulin.  If your blood sugar is over 240 mg/dL, monitor your urine ketones every 4-6 hours.  If you were prescribed an antibiotic medicine, finish all of it even if you start to feel better.  Rest and exercise only as directed by your health care provider.  If you get sick, call your health care provider and begin treatment quickly. Your body often needs extra insulin to fight an illness.  Check your blood glucose levels regularly. If your blood glucose is high, drink plenty of fluids. This helps to flush out ketones.  SEEK MEDICAL CARE IF:  Your blood glucose level is too high or too low.  You have ketones in your urine.  You have a fever.  You cannot eat.  You cannot tolerate fluids.  You have been vomiting for more than 2 hours.  You continue to have symptoms of this condition.  You develop new symptoms.  SEEK IMMEDIATE MEDICAL CARE  "IF:  Your blood glucose levels continue to be high (elevated).  Your monitor reads \"high\" even when you are taking insulin.  You faint.  You have chest pain.  You have trouble breathing.  You have a sudden, severe headache.  You have sudden weakness in one arm or one leg.  You have sudden trouble speaking or swallowing.  You have vomiting or diarrhea that gets worse after 3 hours.  You feel severely fatigued.  You have trouble thinking.  You have abdominal pain.  You are severely dehydrated. Symptoms of severe dehydration include:  Extreme thirst.  Dry mouth.  Blue lips.  Cold hands and feet.  Rapid breathing.     This information is not intended to replace advice given to you by your health care provider. Make sure you discuss any questions you have with your health care provider.     Document Released: 12/15/2001 Document Revised: 05/03/2016 Document Reviewed: 11/25/2015  [employer] Interactive Patient Education ©2016 [employer]      Hyperglycemia  Hyperglycemia occurs when the glucose (sugar) in your blood is too high. Hyperglycemia can happen for many reasons, but it most often happens to people who do not know they have diabetes or are not managing their diabetes properly.   CAUSES   Whether you have diabetes or not, there are other causes of hyperglycemia. Hyperglycemia can occur when you have diabetes, but it can also occur in other situations that you might not be as aware of, such as:  Diabetes  · If you have diabetes and are having problems controlling your blood glucose, hyperglycemia could occur because of some of the following reasons:  ¨ Not following your meal plan.  ¨ Not taking your diabetes medications or not taking it properly.  ¨ Exercising less or doing less activity than you normally do.  ¨ Being sick.  Pre-diabetes  · This cannot be ignored. Before people develop Type 2 diabetes, they almost always have \"pre-diabetes.\" This is when your blood glucose levels are higher than normal, but not yet " "high enough to be diagnosed as diabetes. Research has shown that some long-term damage to the body, especially the heart and circulatory system, may already be occurring during pre-diabetes. If you take action to manage your blood glucose when you have pre-diabetes, you may delay or prevent Type 2 diabetes from developing.  Stress  · If you have diabetes, you may be \"diet\" controlled or on oral medications or insulin to control your diabetes. However, you may find that your blood glucose is higher than usual in the hospital whether you have diabetes or not. This is often referred to as \"stress hyperglycemia.\" Stress can elevate your blood glucose. This happens because of hormones put out by the body during times of stress. If stress has been the cause of your high blood glucose, it can be followed regularly by your caregiver. That way he/she can make sure your hyperglycemia does not continue to get worse or progress to diabetes.  Steroids  · Steroids are medications that act on the infection fighting system (immune system) to block inflammation or infection. One side effect can be a rise in blood glucose. Most people can produce enough extra insulin to allow for this rise, but for those who cannot, steroids make blood glucose levels go even higher. It is not unusual for steroid treatments to \"uncover\" diabetes that is developing. It is not always possible to determine if the hyperglycemia will go away after the steroids are stopped. A special blood test called an A1c is sometimes done to determine if your blood glucose was elevated before the steroids were started.  SYMPTOMS  · Thirsty.  · Frequent urination.  · Dry mouth.  · Blurred vision.  · Tired or fatigue.  · Weakness.  · Sleepy.  · Tingling in feet or leg.  DIAGNOSIS   Diagnosis is made by monitoring blood glucose in one or all of the following ways:  · A1c test. This is a chemical found in your blood.  · Fingerstick blood glucose monitoring.  · Laboratory " results.  TREATMENT   First, knowing the cause of the hyperglycemia is important before the hyperglycemia can be treated. Treatment may include, but is not be limited to:  · Education.  · Change or adjustment in medications.  · Change or adjustment in meal plan.  · Treatment for an illness, infection, etc.  · More frequent blood glucose monitoring.  · Change in exercise plan.  · Decreasing or stopping steroids.  · Lifestyle changes.  HOME CARE INSTRUCTIONS   · Test your blood glucose as directed.  · Exercise regularly. Your caregiver will give you instructions about exercise. Pre-diabetes or diabetes which comes on with stress is helped by exercising.  · Eat wholesome, balanced meals. Eat often and at regular, fixed times. Your caregiver or nutritionist will give you a meal plan to guide your sugar intake.  · Being at an ideal weight is important. If needed, losing as little as 10 to 15 pounds may help improve blood glucose levels.  SEEK MEDICAL CARE IF:   · You have questions about medicine, activity, or diet.  · You continue to have symptoms (problems such as increased thirst, urination, or weight gain).  SEEK IMMEDIATE MEDICAL CARE IF:   · You are vomiting or have diarrhea.  · Your breath smells fruity.  · You are breathing faster or slower.  · You are very sleepy or incoherent.  · You have numbness, tingling, or pain in your feet or hands.  · You have chest pain.  · Your symptoms get worse even though you have been following your caregiver's orders.  · If you have any other questions or concerns.     This information is not intended to replace advice given to you by your health care provider. Make sure you discuss any questions you have with your health care provider.     Document Released: 06/13/2002 Document Revised: 03/11/2013 Document Reviewed: 04/15/2013  [employer] Patient Education ©2016 [employer]

## 2017-08-13 NOTE — PROGRESS NOTES
Patient discharged to home.   Written prescriptions, work note, and instructions given to patient.   Instructed to make a PCP appointment for 1 week, patient agreed.   Escorted downstairs with transport. Home via Uber.

## 2017-08-13 NOTE — DISCHARGE SUMMARY
CHIEF COMPLAINT ON ADMISSION  Chief Complaint   Patient presents with   • High Blood Sugar     FSBS 517   • N/V       CODE STATUS  Full Code    HPI & HOSPITAL COURSE  27 y.o. female with a history of poorly controlled type I diabetes and multiple episodes of DKA in the past, most recently 2 weeks ago who presented 8/9/2017 with nausea vomiting abdominal pain. She was in her normal state of health and stated she has been taking her insulin as prescribed and has not missed any doses. The night before admission around 11 PM she developed a generalized abdominal pain. She subsequently began having nausea and then developed vomiting. She presented to the ED and was diagnosed with DKA admitted to ICU and was started on IV insulin and DKA protocol. Eventually with IVF and IV insulin her gap was closed. She was switched to SQ insulin and was started on diet. Initially, she was experiencing BS in 300s and I resumed her on her lantus BID. She was able to tolerate diet and there were no reports of nausea or vomiting. In addition I had an extensive discussion with her regarding her insulin home ragemin. She states that she has been complaint with her insulin and now that she has insurance she can send in the prescription and obtain her refills for insulin.  I also I discussed with her the importance of compliant with diet and avoid carbohydrates.     On the day of discharge patient was seen and examined by me. Patient is stable with stable vitals. Patient denies any symptoms of abdominal pain, nausea, vomiting, HA or dizziness. Patient ambulating without any difficulty. Patient eating, drinking, urinating, and having normal bowel movements. Patient agrees to go home.       Therefore, she is discharged in good and stable condition with close outpatient follow-up.    SPECIFIC OUTPATIENT FOLLOW-UP  -Follow up with primary care doctor at [facility] as soon as able to make an appointment.   -Return to the Emergency Department if  any of the symptoms worsens or any new symptoms.       DISCHARGE PROBLEM LIST  Principal Problem (Resolved):    DKA (diabetic ketoacidoses) (CMS-HCC) POA: Yes  Active Problems:    Gastroparesis due to DM (CMS-HCC) POA: Yes    Type 1 diabetes mellitus (CMS-[facility]) POA: Yes    GERD (gastroesophageal reflux disease) POA: Yes  Resolved Problems:    Dehydration POA: Yes    Nausea and vomiting in adult patient POA: Yes    Hyperkalemia POA: Yes      FOLLOW UP  No future appointments.  Navi Huber M.D.  [address] [facility] [address]  [phone]    Schedule an appointment as soon as possible for a visit in 1 week      [facility], Emergency Dept  [address] [facility] [id number] [phone]    If symptoms worsen      MEDICATIONS ON DISCHARGE   Alis Sparks   Home Medication Instructions MELISSA:[id number]    Printed on:08/13/17 1987   Medication Information                      atorvastatin (LIPITOR) 20 MG Tab  Take 20 mg by mouth every evening.             atorvastatin (LIPITOR) 20 MG Tab  Take 1 Tab by mouth every day.             Cholecalciferol (VITAMIN D PO)  Take 1 Cap by mouth every day.             ferrous sulfate 325 (65 FE) MG tablet  Take 1 Tab by mouth every day.             insulin aspart (NOVOLOG) 100 UNIT/ML Solution  Inject 1-3 Units as instructed 3 times a day before meals for 30 days.             insulin glargine (LANTUS) 100 UNIT/ML Solution  Inject 30 Units as instructed 2 times a day.             metoclopramide (REGLAN) 10 MG Tab  Take 1 Tab by mouth 1 time daily as needed (gastroparesis symptoms).                 DIET  Orders Placed This Encounter   Procedures   • DIET ORDER     Standing Status: Standing      Number of Occurrences: 1      Standing Expiration Date:      Order Specific Question:  Diet:     Answer:  Regular [1]     Order Specific Question:  Diet:     Answer:  Diabetic [3]       ACTIVITY  As tolerated.  Weight bearing as  tolerated      CONSULTATIONS  None.    PROCEDURES  None.     LABORATORY  Lab Results   Component Value Date/Time    SODIUM 137 08/13/2017 02:51 AM    POTASSIUM 3.6 08/13/2017 02:51 AM    CHLORIDE 104 08/13/2017 02:51 AM    CO2 24 08/13/2017 02:51 AM    GLUCOSE 233* 08/13/2017 02:51 AM    BUN 18 08/13/2017 02:51 AM    CREATININE 0.41* 08/13/2017 02:51 AM        Lab Results   Component Value Date/Time    WBC 5.0 08/13/2017 02:51 AM    HEMOGLOBIN 12.1 08/13/2017 02:51 AM    HEMATOCRIT 35.9* 08/13/2017 02:51 AM    PLATELET COUNT 163* 08/13/2017 02:51 AM        Total time of the discharge process exceeds 31 minutes     At the time of discharge, patient is alert and orientedx4. She has a reasonable understanding of diagnosis and plan of care. She understands that she requires to remain complaint with her insulin therapy and follow up with her primary care doctor. She demonstrates understanding by repeating the instructions back to me. All questions were answered.

## 2017-08-13 NOTE — PROGRESS NOTES
Pt stable, no distress noted, denies pain or problems at this time. POC discussed and pt states understanding, call light in reach, safety measures in place, hourly rounding done, blood sugars will be checked and evaluated for medication coverage, care assumed.

## 2017-09-20 ENCOUNTER — APPOINTMENT (OUTPATIENT)
Dept: RADIOLOGY | Facility: MEDICAL CENTER | Age: 27
DRG: 638 | End: 2017-09-20
Attending: EMERGENCY MEDICINE
Payer: MEDICAID

## 2017-09-20 ENCOUNTER — RESOLUTE PROFESSIONAL BILLING HOSPITAL PROF FEE (OUTPATIENT)
Dept: HOSPITALIST | Facility: MEDICAL CENTER | Age: 27
End: 2017-09-20
Payer: MEDICAID

## 2017-09-20 ENCOUNTER — HOSPITAL ENCOUNTER (INPATIENT)
Facility: MEDICAL CENTER | Age: 27
LOS: 4 days | DRG: 638 | End: 2017-09-24
Attending: EMERGENCY MEDICINE | Admitting: HOSPITALIST
Payer: MEDICAID

## 2017-09-20 DIAGNOSIS — E10.10 DKA, TYPE 1, NOT AT GOAL (HCC): ICD-10-CM

## 2017-09-20 PROBLEM — E87.20 METABOLIC ACIDOSIS: Status: RESOLVED | Noted: 2017-09-20 | Resolved: 2017-09-20

## 2017-09-20 PROBLEM — E10.9 DIABETES TYPE 1, CONTROLLED (HCC): Status: ACTIVE | Noted: 2017-09-20

## 2017-09-20 PROBLEM — E87.20 METABOLIC ACIDOSIS: Status: ACTIVE | Noted: 2017-09-20

## 2017-09-20 PROBLEM — D72.829 LEUKOCYTOSIS: Status: ACTIVE | Noted: 2017-09-20

## 2017-09-20 PROBLEM — E87.1 HYPONATREMIA: Status: ACTIVE | Noted: 2017-09-20

## 2017-09-20 PROBLEM — E11.10 DKA (DIABETIC KETOACIDOSES): Status: ACTIVE | Noted: 2017-09-20

## 2017-09-20 PROBLEM — E87.29 HIGH ANION GAP METABOLIC ACIDOSIS: Status: ACTIVE | Noted: 2017-09-20

## 2017-09-20 LAB
ALBUMIN SERPL BCP-MCNC: 4.3 G/DL (ref 3.2–4.9)
ALBUMIN/GLOB SERPL: 1.2 G/DL
ALP SERPL-CCNC: 198 U/L (ref 30–99)
ALT SERPL-CCNC: 12 U/L (ref 2–50)
ANION GAP SERPL CALC-SCNC: 15 MMOL/L (ref 0–11.9)
ANION GAP SERPL CALC-SCNC: 23 MMOL/L (ref 0–11.9)
ANION GAP SERPL CALC-SCNC: 25 MMOL/L (ref 0–11.9)
AST SERPL-CCNC: 14 U/L (ref 12–45)
B-OH-BUTYR SERPL-MCNC: >8 MMOL/L (ref 0.02–0.27)
BASOPHILS # BLD AUTO: 0 % (ref 0–1.8)
BASOPHILS # BLD: 0 K/UL (ref 0–0.12)
BILIRUB SERPL-MCNC: 0.4 MG/DL (ref 0.1–1.5)
BUN SERPL-MCNC: 17 MG/DL (ref 8–22)
BUN SERPL-MCNC: 17 MG/DL (ref 8–22)
BUN SERPL-MCNC: 19 MG/DL (ref 8–22)
CALCIUM SERPL-MCNC: 8 MG/DL (ref 8.5–10.5)
CALCIUM SERPL-MCNC: 8.4 MG/DL (ref 8.5–10.5)
CALCIUM SERPL-MCNC: 9.4 MG/DL (ref 8.5–10.5)
CHLORIDE SERPL-SCNC: 103 MMOL/L (ref 96–112)
CHLORIDE SERPL-SCNC: 118 MMOL/L (ref 96–112)
CHLORIDE SERPL-SCNC: 123 MMOL/L (ref 96–112)
CO2 SERPL-SCNC: 7 MMOL/L (ref 20–33)
CO2 SERPL-SCNC: <5 MMOL/L (ref 20–33)
CO2 SERPL-SCNC: <5 MMOL/L (ref 20–33)
CREAT SERPL-MCNC: 0.64 MG/DL (ref 0.5–1.4)
CREAT SERPL-MCNC: 0.81 MG/DL (ref 0.5–1.4)
CREAT SERPL-MCNC: 1 MG/DL (ref 0.5–1.4)
EOSINOPHIL # BLD AUTO: 0.11 K/UL (ref 0–0.51)
EOSINOPHIL NFR BLD: 0.9 % (ref 0–6.9)
ERYTHROCYTE [DISTWIDTH] IN BLOOD BY AUTOMATED COUNT: 43.3 FL (ref 35.9–50)
EST. AVERAGE GLUCOSE BLD GHB EST-MCNC: 361 MG/DL
GFR SERPL CREATININE-BSD FRML MDRD: >60 ML/MIN/1.73 M 2
GLOBULIN SER CALC-MCNC: 3.6 G/DL (ref 1.9–3.5)
GLUCOSE BLD-MCNC: 128 MG/DL (ref 65–99)
GLUCOSE BLD-MCNC: 137 MG/DL (ref 65–99)
GLUCOSE BLD-MCNC: 137 MG/DL (ref 65–99)
GLUCOSE BLD-MCNC: 180 MG/DL (ref 65–99)
GLUCOSE BLD-MCNC: 269 MG/DL (ref 65–99)
GLUCOSE BLD-MCNC: 341 MG/DL (ref 65–99)
GLUCOSE BLD-MCNC: 373 MG/DL (ref 65–99)
GLUCOSE BLD-MCNC: 439 MG/DL (ref 65–99)
GLUCOSE BLD-MCNC: 503 MG/DL (ref 65–99)
GLUCOSE BLD-MCNC: 558 MG/DL (ref 65–99)
GLUCOSE BLD-MCNC: 593 MG/DL (ref 65–99)
GLUCOSE SERPL-MCNC: 144 MG/DL (ref 65–99)
GLUCOSE SERPL-MCNC: 245 MG/DL (ref 65–99)
GLUCOSE SERPL-MCNC: 638 MG/DL (ref 65–99)
HBA1C MFR BLD: 14.2 % (ref 0–5.6)
HCT VFR BLD AUTO: 47.6 % (ref 37–47)
HGB BLD-MCNC: 15.9 G/DL (ref 12–16)
LACTATE BLD-SCNC: 1.5 MMOL/L (ref 0.5–2)
LIPASE SERPL-CCNC: 14 U/L (ref 11–82)
LYMPHOCYTES # BLD AUTO: 3.68 K/UL (ref 1–4.8)
LYMPHOCYTES NFR BLD: 30.7 % (ref 22–41)
MAGNESIUM SERPL-MCNC: 2 MG/DL (ref 1.5–2.5)
MAGNESIUM SERPL-MCNC: 2.1 MG/DL (ref 1.5–2.5)
MANUAL DIFF BLD: NORMAL
MCH RBC QN AUTO: 29.4 PG (ref 27–33)
MCHC RBC AUTO-ENTMCNC: 33.4 G/DL (ref 33.6–35)
MCV RBC AUTO: 88.1 FL (ref 81.4–97.8)
METAMYELOCYTES NFR BLD MANUAL: 0.9 %
MONOCYTES # BLD AUTO: 0.2 K/UL (ref 0–0.85)
MONOCYTES NFR BLD AUTO: 1.7 % (ref 0–13.4)
MORPHOLOGY BLD-IMP: NORMAL
NEUTROPHILS # BLD AUTO: 7.9 K/UL (ref 2–7.15)
NEUTROPHILS NFR BLD: 60.5 % (ref 44–72)
NEUTS BAND NFR BLD MANUAL: 5.3 % (ref 0–10)
NRBC # BLD AUTO: 0 K/UL
NRBC BLD AUTO-RTO: 0 /100 WBC
PHOSPHATE SERPL-MCNC: 2.5 MG/DL (ref 2.5–4.5)
PLATELET # BLD AUTO: 271 K/UL (ref 164–446)
PLATELET BLD QL SMEAR: NORMAL
PMV BLD AUTO: 10.4 FL (ref 9–12.9)
POTASSIUM SERPL-SCNC: 3.5 MMOL/L (ref 3.6–5.5)
POTASSIUM SERPL-SCNC: 4.2 MMOL/L (ref 3.6–5.5)
POTASSIUM SERPL-SCNC: 4.2 MMOL/L (ref 3.6–5.5)
PROT SERPL-MCNC: 7.9 G/DL (ref 6–8.2)
RBC # BLD AUTO: 5.4 M/UL (ref 4.2–5.4)
RBC BLD AUTO: NORMAL
SODIUM SERPL-SCNC: 133 MMOL/L (ref 135–145)
SODIUM SERPL-SCNC: 145 MMOL/L (ref 135–145)
SODIUM SERPL-SCNC: 146 MMOL/L (ref 135–145)
WBC # BLD AUTO: 12 K/UL (ref 4.8–10.8)

## 2017-09-20 PROCEDURE — 96361 HYDRATE IV INFUSION ADD-ON: CPT

## 2017-09-20 PROCEDURE — 83690 ASSAY OF LIPASE: CPT

## 2017-09-20 PROCEDURE — 80048 BASIC METABOLIC PNL TOTAL CA: CPT | Mod: 91

## 2017-09-20 PROCEDURE — 700111 HCHG RX REV CODE 636 W/ 250 OVERRIDE (IP)

## 2017-09-20 PROCEDURE — 36556 INSERT NON-TUNNEL CV CATH: CPT

## 2017-09-20 PROCEDURE — 700105 HCHG RX REV CODE 258: Performed by: EMERGENCY MEDICINE

## 2017-09-20 PROCEDURE — 82010 KETONE BODYS QUAN: CPT

## 2017-09-20 PROCEDURE — 770022 HCHG ROOM/CARE - ICU (200)

## 2017-09-20 PROCEDURE — 02HV33Z INSERTION OF INFUSION DEVICE INTO SUPERIOR VENA CAVA, PERCUTANEOUS APPROACH: ICD-10-PCS | Performed by: EMERGENCY MEDICINE

## 2017-09-20 PROCEDURE — 82962 GLUCOSE BLOOD TEST: CPT | Mod: 91

## 2017-09-20 PROCEDURE — 700111 HCHG RX REV CODE 636 W/ 250 OVERRIDE (IP): Performed by: HOSPITALIST

## 2017-09-20 PROCEDURE — 83735 ASSAY OF MAGNESIUM: CPT | Mod: 91

## 2017-09-20 PROCEDURE — 83036 HEMOGLOBIN GLYCOSYLATED A1C: CPT

## 2017-09-20 PROCEDURE — 71010 DX-CHEST-PORTABLE (1 VIEW): CPT

## 2017-09-20 PROCEDURE — 700105 HCHG RX REV CODE 258: Performed by: HOSPITALIST

## 2017-09-20 PROCEDURE — C1751 CATH, INF, PER/CENT/MIDLINE: HCPCS | Performed by: EMERGENCY MEDICINE

## 2017-09-20 PROCEDURE — 36415 COLL VENOUS BLD VENIPUNCTURE: CPT

## 2017-09-20 PROCEDURE — C1751 CATH, INF, PER/CENT/MIDLINE: HCPCS

## 2017-09-20 PROCEDURE — 700102 HCHG RX REV CODE 250 W/ 637 OVERRIDE(OP): Performed by: HOSPITALIST

## 2017-09-20 PROCEDURE — 83605 ASSAY OF LACTIC ACID: CPT

## 2017-09-20 PROCEDURE — 84100 ASSAY OF PHOSPHORUS: CPT

## 2017-09-20 PROCEDURE — 96374 THER/PROPH/DIAG INJ IV PUSH: CPT

## 2017-09-20 PROCEDURE — 85027 COMPLETE CBC AUTOMATED: CPT

## 2017-09-20 PROCEDURE — 700111 HCHG RX REV CODE 636 W/ 250 OVERRIDE (IP): Performed by: EMERGENCY MEDICINE

## 2017-09-20 PROCEDURE — 96375 TX/PRO/DX INJ NEW DRUG ADDON: CPT

## 2017-09-20 PROCEDURE — 85007 BL SMEAR W/DIFF WBC COUNT: CPT

## 2017-09-20 PROCEDURE — 99291 CRITICAL CARE FIRST HOUR: CPT | Performed by: HOSPITALIST

## 2017-09-20 PROCEDURE — 80053 COMPREHEN METABOLIC PANEL: CPT

## 2017-09-20 PROCEDURE — 99291 CRITICAL CARE FIRST HOUR: CPT

## 2017-09-20 RX ORDER — MAGNESIUM SULFATE HEPTAHYDRATE 40 MG/ML
2 INJECTION, SOLUTION INTRAVENOUS
Status: COMPLETED | OUTPATIENT
Start: 2017-09-20 | End: 2017-09-21

## 2017-09-20 RX ORDER — SODIUM CHLORIDE 450 MG/100ML
INJECTION, SOLUTION INTRAVENOUS CONTINUOUS
Status: DISCONTINUED | OUTPATIENT
Start: 2017-09-20 | End: 2017-09-21

## 2017-09-20 RX ORDER — MORPHINE SULFATE 4 MG/ML
INJECTION, SOLUTION INTRAMUSCULAR; INTRAVENOUS
Status: COMPLETED
Start: 2017-09-20 | End: 2017-09-20

## 2017-09-20 RX ORDER — POTASSIUM CHLORIDE 14.9 MG/ML
20 INJECTION INTRAVENOUS ONCE
Status: COMPLETED | OUTPATIENT
Start: 2017-09-20 | End: 2017-09-20

## 2017-09-20 RX ORDER — SODIUM CHLORIDE 9 MG/ML
1000 INJECTION, SOLUTION INTRAVENOUS ONCE
Status: COMPLETED | OUTPATIENT
Start: 2017-09-20 | End: 2017-09-20

## 2017-09-20 RX ORDER — ACETAMINOPHEN 325 MG/1
650 TABLET ORAL EVERY 6 HOURS PRN
Status: DISCONTINUED | OUTPATIENT
Start: 2017-09-20 | End: 2017-09-24 | Stop reason: HOSPADM

## 2017-09-20 RX ORDER — DEXTROSE MONOHYDRATE 25 G/50ML
25 INJECTION, SOLUTION INTRAVENOUS
Status: DISCONTINUED | OUTPATIENT
Start: 2017-09-20 | End: 2017-09-24 | Stop reason: HOSPADM

## 2017-09-20 RX ORDER — BISACODYL 10 MG
10 SUPPOSITORY, RECTAL RECTAL
Status: DISCONTINUED | OUTPATIENT
Start: 2017-09-20 | End: 2017-09-24 | Stop reason: HOSPADM

## 2017-09-20 RX ORDER — MORPHINE SULFATE 4 MG/ML
4 INJECTION, SOLUTION INTRAMUSCULAR; INTRAVENOUS ONCE
Status: COMPLETED | OUTPATIENT
Start: 2017-09-20 | End: 2017-09-20

## 2017-09-20 RX ORDER — ONDANSETRON 2 MG/ML
4 INJECTION INTRAMUSCULAR; INTRAVENOUS EVERY 4 HOURS PRN
Status: DISCONTINUED | OUTPATIENT
Start: 2017-09-20 | End: 2017-09-24 | Stop reason: HOSPADM

## 2017-09-20 RX ORDER — POLYETHYLENE GLYCOL 3350 17 G/17G
1 POWDER, FOR SOLUTION ORAL
Status: DISCONTINUED | OUTPATIENT
Start: 2017-09-20 | End: 2017-09-24 | Stop reason: HOSPADM

## 2017-09-20 RX ORDER — AMOXICILLIN 250 MG
2 CAPSULE ORAL 2 TIMES DAILY
Status: DISCONTINUED | OUTPATIENT
Start: 2017-09-20 | End: 2017-09-24 | Stop reason: HOSPADM

## 2017-09-20 RX ORDER — POTASSIUM CHLORIDE 14.9 MG/ML
20 INJECTION INTRAVENOUS ONCE
Status: COMPLETED | OUTPATIENT
Start: 2017-09-20 | End: 2017-09-21

## 2017-09-20 RX ORDER — MORPHINE SULFATE 4 MG/ML
2 INJECTION, SOLUTION INTRAMUSCULAR; INTRAVENOUS EVERY 4 HOURS PRN
Status: DISCONTINUED | OUTPATIENT
Start: 2017-09-20 | End: 2017-09-21

## 2017-09-20 RX ORDER — SODIUM CHLORIDE 9 MG/ML
INJECTION, SOLUTION INTRAVENOUS CONTINUOUS
Status: DISCONTINUED | OUTPATIENT
Start: 2017-09-20 | End: 2017-09-20

## 2017-09-20 RX ORDER — ONDANSETRON 2 MG/ML
4 INJECTION INTRAMUSCULAR; INTRAVENOUS ONCE
Status: COMPLETED | OUTPATIENT
Start: 2017-09-20 | End: 2017-09-20

## 2017-09-20 RX ORDER — DEXTROSE AND SODIUM CHLORIDE 5; .45 G/100ML; G/100ML
INJECTION, SOLUTION INTRAVENOUS CONTINUOUS
Status: ACTIVE | OUTPATIENT
Start: 2017-09-20 | End: 2017-09-21

## 2017-09-20 RX ORDER — MAGNESIUM SULFATE HEPTAHYDRATE 40 MG/ML
4 INJECTION, SOLUTION INTRAVENOUS
Status: COMPLETED | OUTPATIENT
Start: 2017-09-20 | End: 2017-09-21

## 2017-09-20 RX ORDER — DEXTROSE AND SODIUM CHLORIDE 10; .45 G/100ML; G/100ML
INJECTION, SOLUTION INTRAVENOUS CONTINUOUS
Status: ACTIVE | OUTPATIENT
Start: 2017-09-20 | End: 2017-09-21

## 2017-09-20 RX ORDER — SODIUM CHLORIDE 9 MG/ML
2000 INJECTION, SOLUTION INTRAVENOUS ONCE
Status: DISCONTINUED | OUTPATIENT
Start: 2017-09-20 | End: 2017-09-21

## 2017-09-20 RX ORDER — SODIUM CHLORIDE 9 MG/ML
1000 INJECTION, SOLUTION INTRAVENOUS ONCE
Status: DISCONTINUED | OUTPATIENT
Start: 2017-09-20 | End: 2017-09-20

## 2017-09-20 RX ORDER — ONDANSETRON 2 MG/ML
INJECTION INTRAMUSCULAR; INTRAVENOUS
Status: COMPLETED
Start: 2017-09-20 | End: 2017-09-20

## 2017-09-20 RX ORDER — ONDANSETRON 4 MG/1
4 TABLET, ORALLY DISINTEGRATING ORAL EVERY 4 HOURS PRN
Status: DISCONTINUED | OUTPATIENT
Start: 2017-09-20 | End: 2017-09-24 | Stop reason: HOSPADM

## 2017-09-20 RX ORDER — ATORVASTATIN CALCIUM 20 MG/1
20 TABLET, FILM COATED ORAL
Status: DISCONTINUED | OUTPATIENT
Start: 2017-09-20 | End: 2017-09-24 | Stop reason: HOSPADM

## 2017-09-20 RX ADMIN — DEXTROSE AND SODIUM CHLORIDE: 10; .45 INJECTION, SOLUTION INTRAVENOUS at 20:18

## 2017-09-20 RX ADMIN — ONDANSETRON 4 MG: 2 INJECTION INTRAMUSCULAR; INTRAVENOUS at 20:07

## 2017-09-20 RX ADMIN — SODIUM CHLORIDE: 4.5 INJECTION, SOLUTION INTRAVENOUS at 13:09

## 2017-09-20 RX ADMIN — MORPHINE SULFATE 4 MG: 4 INJECTION INTRAVENOUS at 10:12

## 2017-09-20 RX ADMIN — MORPHINE SULFATE 2 MG: 4 INJECTION INTRAVENOUS at 16:47

## 2017-09-20 RX ADMIN — INSULIN HUMAN 7 UNITS: 100 INJECTION, SOLUTION PARENTERAL at 12:38

## 2017-09-20 RX ADMIN — MORPHINE SULFATE 2 MG: 4 INJECTION INTRAVENOUS at 21:08

## 2017-09-20 RX ADMIN — SODIUM CHLORIDE 1000 ML: 9 INJECTION, SOLUTION INTRAVENOUS at 19:03

## 2017-09-20 RX ADMIN — SODIUM CHLORIDE 1000 ML: 9 INJECTION, SOLUTION INTRAVENOUS at 10:18

## 2017-09-20 RX ADMIN — POTASSIUM CHLORIDE 20 MEQ: 14.9 INJECTION, SOLUTION INTRAVENOUS at 23:08

## 2017-09-20 RX ADMIN — POTASSIUM CHLORIDE 20 MEQ: 14.9 INJECTION, SOLUTION INTRAVENOUS at 19:59

## 2017-09-20 RX ADMIN — SODIUM CHLORIDE 3 UNITS/HR: 9 INJECTION, SOLUTION INTRAVENOUS at 13:10

## 2017-09-20 RX ADMIN — SODIUM CHLORIDE 1000 ML: 9 INJECTION, SOLUTION INTRAVENOUS at 10:50

## 2017-09-20 RX ADMIN — ONDANSETRON 4 MG: 2 INJECTION INTRAMUSCULAR; INTRAVENOUS at 10:12

## 2017-09-20 RX ADMIN — DEXTROSE AND SODIUM CHLORIDE: 5; .45 INJECTION, SOLUTION INTRAVENOUS at 19:51

## 2017-09-20 ASSESSMENT — ENCOUNTER SYMPTOMS
DIZZINESS: 0
SPUTUM PRODUCTION: 0
CONSTIPATION: 0
PHOTOPHOBIA: 0
PALPITATIONS: 0
SHORTNESS OF BREATH: 0
SORE THROAT: 0
NERVOUS/ANXIOUS: 1
CLAUDICATION: 0
COUGH: 0
HEMOPTYSIS: 0
SPEECH CHANGE: 0
MYALGIAS: 0
SENSORY CHANGE: 0
STRIDOR: 0
DEPRESSION: 0
NECK PAIN: 0
TREMORS: 0
NAUSEA: 0
FEVER: 1
DOUBLE VISION: 0
BLOOD IN STOOL: 0
TINGLING: 0
CHILLS: 0
BACK PAIN: 0
BLURRED VISION: 0
WEAKNESS: 1
MEMORY LOSS: 0
VOMITING: 0
NAUSEA: 1
EYE PAIN: 0
PND: 0
ABDOMINAL PAIN: 1
ORTHOPNEA: 0
HEADACHES: 0
VOMITING: 1
HEARTBURN: 0

## 2017-09-20 ASSESSMENT — PATIENT HEALTH QUESTIONNAIRE - PHQ9
SUM OF ALL RESPONSES TO PHQ9 QUESTIONS 1 AND 2: 0
SUM OF ALL RESPONSES TO PHQ QUESTIONS 1-9: 0
2. FEELING DOWN, DEPRESSED, IRRITABLE, OR HOPELESS: NOT AT ALL
1. LITTLE INTEREST OR PLEASURE IN DOING THINGS: NOT AT ALL

## 2017-09-20 ASSESSMENT — PAIN SCALES - GENERAL
PAINLEVEL_OUTOF10: 2
PAINLEVEL_OUTOF10: 4
PAINLEVEL_OUTOF10: 3
PAINLEVEL_OUTOF10: 2
PAINLEVEL_OUTOF10: 7
PAINLEVEL_OUTOF10: 2
PAINLEVEL_OUTOF10: 5
PAINLEVEL_OUTOF10: 1

## 2017-09-20 ASSESSMENT — LIFESTYLE VARIABLES
EVER_SMOKED: NEVER
ALCOHOL_USE: NO

## 2017-09-20 NOTE — ED NOTES
Patient ambulated up to the bathroom with family. SBA. Patient given 7 units of insulin per MAR. Pharm updated on patient's current Blood sugar. Report given to ICU RN. Patient transported to unit with ICU RN and CNA. Chart and belongings taken with patient.

## 2017-09-20 NOTE — FLOWSHEET NOTE
09/20/17 1300   Respiratory WDL   Respiratory (WDL) WDL   Chest Exam   Respiration (!) 24   Pulse (!) 125  (Simultaneous filing. User may not have seen previous data.)   Heart Rate (Monitored) (!) 125  (Simultaneous filing. User may not have seen previous data.)   Oximetry   Continuous Oximetry Yes   O2 Alarms Set & Reviewed Yes   Oxygen   Pulse Oximetry 99 %  (Simultaneous filing. User may not have seen previous data.)   O2 Daily Delivery Respiratory  Room Air with O2 Available

## 2017-09-20 NOTE — FLOWSHEET NOTE
09/20/17 1300   Events/Summary/Plan   Events/Summary/Plan O2 protocol done.  No pulmonary Hx.  No respiratory distress.  No O2 indicated at this time.   Respiratory WDL   Respiratory (WDL) WDL   Chest Exam   Respiration (!) 24   Pulse (!) 125   Heart Rate (Monitored) (!) 125   Oximetry   Continuous Oximetry Yes   O2 Alarms Set & Reviewed Yes   Oxygen   Home O2 Use Prior To Admission? No   Pulse Oximetry 99 %   O2 Daily Delivery Respiratory  Room Air with O2 Available

## 2017-09-20 NOTE — H&P
"Hospital Medicine History and Physical    Date of Service  9/20/2017    Chief Complaint  Chief Complaint   Patient presents with   • High Blood Sugar     pt's mother reports \"HI\" blood sugar on glucometer. Kuss maul respirations noted in triage.        History of Presenting Illness  27 y.o. female with a history of uncontrolled Type 1 Diabetes mellitus who presented 9/20/2017 with feeling of malaise over the past several days. Patient said her blood sugars kept reading high on her glucometer, despite being compliant. She reports having fevers, nausea and vomiting. Denies having productive cough, denies dysuria or diarrhea.         Primary Care Physician  Navi Huber M.D.    Consultants  None    Code Status  Code: Full code    Review of Systems  Review of Systems   Constitutional: Positive for fever and malaise/fatigue. Negative for chills.   HENT: Negative for congestion, hearing loss, sore throat and tinnitus.    Eyes: Negative for blurred vision, double vision, photophobia and pain.   Respiratory: Negative for cough, hemoptysis, sputum production, shortness of breath and stridor.    Cardiovascular: Negative for chest pain, palpitations, orthopnea, claudication and PND.   Gastrointestinal: Negative for blood in stool, constipation, heartburn, melena, nausea and vomiting.   Genitourinary: Negative for dysuria, frequency and urgency.   Musculoskeletal: Negative for back pain, myalgias and neck pain.   Neurological: Positive for weakness. Negative for dizziness, tingling, tremors, sensory change, speech change and headaches.   Psychiatric/Behavioral: Negative for depression, memory loss and suicidal ideas. The patient is nervous/anxious.           Past Medical History  1.Type 1 diabetes mellitus.  2. Dyslipidemia     Surgical History  Past Surgical History:   Procedure Laterality Date   • GASTROSCOPY-ENDO  9/18/2014    Performed by Sylvain PERRY M.D. at ENDOSCOPY [facility]   • GASTROSCOPY WITH " BIOPSY  2014    Performed by Sylvain PERRY M.D. at ENDOSCOPY [facility]   • OTHER      surgery to patch lung after pneumor from central line placement       Medications  No current facility-administered medications on file prior to encounter.      Current Outpatient Prescriptions on File Prior to Encounter   Medication Sig Dispense Refill   • atorvastatin (LIPITOR) 20 MG Tab Take 1 Tab by mouth every day. 30 Tab 0   • metoclopramide (REGLAN) 10 MG Tab Take 1 Tab by mouth 1 time daily as needed (gastroparesis symptoms). 30 Tab 0   • insulin glargine (LANTUS) 100 UNIT/ML Solution Inject 30 Units as instructed 2 times a day. 10 mL 2       Family History  Family History   Problem Relation Age of Onset   • Cancer       breasts, multiple family members       Social History  Social History   Substance Use Topics   • Smoking status: Never Smoker   • Smokeless tobacco: Never Used   • Alcohol use No       Allergies  Allergies   Allergen Reactions   • Pcn [Penicillins] Shortness of Breath and Swelling     Per patient's Mom, patient tolerates Keflex   • Promethazine Vomiting   • Lisinopril      Per historical: Reported pedal swelling. No facial/angioedema or rash nor respiratory symptoms.         Physical Exam  Laboratory   Hemodynamics  Temp (24hrs), Av.8 °C (98.2 °F), Min:36.4 °C (97.6 °F), Max:37.1 °C (98.8 °F)   Temperature: 37.1 °C (98.8 °F)  Pulse  Av.2  Min: 123  Max: 146 Heart Rate (Monitored): (!) 134  Blood Pressure: 125/74, NIBP: 115/72      Respiratory      Respiration: (!) 23, Pulse Oximetry: 100 %, O2 Daily Delivery Respiratory : Room Air with O2 Available     Work Of Breathing / Effort: Moderate;Tachypnea       Physical Exam   Constitutional: She is oriented to person, place, and time. She appears well-developed. She appears distressed.   HENT:   Head: Normocephalic and atraumatic.   Mouth/Throat: No oropharyngeal exudate.   Eyes: Conjunctivae are normal. Pupils are equal, round, and reactive  to light. Right eye exhibits no discharge. No scleral icterus.   Neck: Neck supple. No JVD present. No thyromegaly present.   Cardiovascular: Intact distal pulses.    No murmur heard.  Tachycardic    Pulmonary/Chest: Effort normal. No stridor. No respiratory distress. She has no wheezes. She has no rales.   Abdominal: Soft. Bowel sounds are normal. She exhibits no distension. There is no tenderness. There is no rebound.   Musculoskeletal: Normal range of motion. She exhibits no edema.   Neurological: She is alert and oriented to person, place, and time. No cranial nerve deficit.   Skin: Skin is warm. She is not diaphoretic. No erythema.   Psychiatric: Her behavior is normal. Thought content normal.   Anxious          Assessment/Plan  * DKA (diabetic ketoacidoses) (CMS-HCC)- (present on admission)   Assessment & Plan    Serum bicarb less than 5, anion gap greater than 25  Start insulin drip at 0.05units/kg/hr,  Can titrate insulin drip down if sugar goes below 250. Continue checking anion gap with q4 hour bmp's; once anion gap and serum bicarb has normalized, can start home dose of lantus for which 1-2 hours later can discontinue insulin drip. Then start insulin sliding scale.          Diabetes type 1, controlled (CMS-HCC)- (present on admission)   Assessment & Plan    Uncontrolled  A1c: > 14.  We will order diabetic education, and reemphasized importance of insulin compliance.         High anion gap metabolic acidosis- (present on admission)   Assessment & Plan    2/2 to DKA, continue DKA protocol and supportive treatment, and monitor bmp daily.          Leukocytosis- (present on admission)   Assessment & Plan    Most likely reactive, follow morning cbc.        Hyponatremia- (present on admission)   Assessment & Plan    2/2 to dehydration, cont IV fluids, will correct when dka resolves            I anticipate this patient will require at least two midnights for appropriate medical management, necessitating inpatient  admission.    Prophylaxis: sc heparin    Recent Labs      09/20/17   1015   WBC  12.0*   RBC  5.40   HEMOGLOBIN  15.9   HEMATOCRIT  47.6*   MCV  88.1   MCH  29.4   MCHC  33.4*   RDW  43.3   PLATELETCT  271   MPV  10.4     Recent Labs      09/20/17   1015   SODIUM  133*   POTASSIUM  4.2   CHLORIDE  103   CO2  <5*   GLUCOSE  638*   BUN  17   CREATININE  1.00   CALCIUM  9.4     Recent Labs      09/20/17   1015   ALTSGPT  12   ASTSGOT  14   ALKPHOSPHAT  198*   TBILIRUBIN  0.4   LIPASE  14   GLUCOSE  638*                 Lab Results   Component Value Date    TROPONINI <0.01 07/27/2017       Imaging  DX-CHEST-PORTABLE (1 VIEW)   Final Result      Interval placement of a left IJ central line without evidence of complication.

## 2017-09-20 NOTE — ED NOTES
Med rec complete per mother at bedside and [facility] pharmacy  Allergies reviewed    Patient was not talking when I was in room, do not know last doses taken, the mom did state she had Lantus today

## 2017-09-20 NOTE — ED NOTES
"Chief Complaint   Patient presents with   • High Blood Sugar     pt's mother reports \"HI\" blood sugar on glucometer. Kuss maul respirations noted in triage.      Charge RN notified. Pt to R6.  "

## 2017-09-20 NOTE — ED NOTES
"Chief Complaint   Patient presents with   • High Blood Sugar     pt's mother reports \"HI\" blood sugar on glucometer. Kuss maul respirations noted in triage.      /74   Pulse (!) 129   Temp 36.4 °C (97.6 °F)   Resp 16   Ht 1.651 m (5' 5\")   Wt 68.7 kg (151 lb 7.3 oz)   SpO2 98%   BMI 25.20 kg/m²     Patient roomed. Family at bedside. Placed on monitor. Blood sugar: 558. Respirations: 25. Patient complains of generalize pain. Per patient family she has not been feeling well for several days.       "

## 2017-09-20 NOTE — ED NOTES
ERP at bedside. PIV established, labs drawn, and fluids started. Patient medicated per MAR. ERP at bedside starting central line. Patient educated on risks.

## 2017-09-20 NOTE — PROGRESS NOTES
Spoke with Dr Jackelin Urbina for pt c/o pain and no return call from 2 pages to admitting md. Orders received.

## 2017-09-20 NOTE — ED NOTES
Vicky from Lab called with critical result of glucose of 638 and Co2 <5 at 1120. Critical lab result read back to .   Dr. Fermin notified of critical lab result at 1126.  Critical lab result read back by Dr. Fermin.

## 2017-09-20 NOTE — ED PROVIDER NOTES
"ED Provider Note    Scribed for Hi Fermin M.D. by Ya May. 9/20/2017, 10:03 AM.    Primary care provider: Navi Huber M.D.  Means of arrival: Walk-in  History obtained from: Mother  History limited by: None     CHIEF COMPLAINT  Chief Complaint   Patient presents with   • High Blood Sugar     pt's mother reports \"HI\" blood sugar on glucometer. Kuss maul respirations noted in triage.        CHARANJIT Sparks is a 27 y.o. female with a history of Diabetes Type I who presents to the Emergency Department for hyperglycemia. Per mother, the patient has not been feeling well for the last few days, but has been compliant with her diabetes medication. Her blood sugars read \"high\" on glucometer this morning after medicating with her normal diabetes medication and she came into the ED for evaluation. She reports associated fever, nausea, vomiting, and abdominal pain and states she normally experiences abdominal pains when she is in DKA. Per nurse's notes, kussmaul respirations were noted in triage. Patient states she has received a right central line multiple times previously for IV access.    REVIEW OF SYSTEMS  Review of Systems   Constitutional: Positive for fever.   Gastrointestinal: Positive for abdominal pain, nausea and vomiting.   All other systems reviewed and are negative.  C.     PAST MEDICAL HISTORY   has a past medical history of Backpain; Bronchitis; Diabetes type 1, controlled (CMS-[facility]); DKA (diabetic ketoacidoses) (CMS-[facility]); Fall; Gastroparesis; Kidney infection; Pneumonia; and UTI (urinary tract infection).    SURGICAL HISTORY   has a past surgical history that includes gastroscopy-endo (9/18/2014); gastroscopy with biopsy (9/18/2014); and other.    SOCIAL HISTORY  Social History   Substance Use Topics   • Smoking status: Never Smoker   • Smokeless tobacco: Never Used   • Alcohol use No      History   Drug Use No       FAMILY HISTORY  Family History   Problem Relation Age of Onset " "  • Cancer       breasts, multiple family members       CURRENT MEDICATIONS  Home Medications     Reviewed by Jacquelyn Ramos R.N. (Registered Nurse) on 09/20/17 at 0957  Med List Status: Not Addressed   Medication Last Dose Status   atorvastatin (LIPITOR) 20 MG Tab 9/19/2017 Active   atorvastatin (LIPITOR) 20 MG Tab  Active   insulin aspart (NOVOLOG) 100 UNIT/ML Solution  Active   insulin glargine (LANTUS) 100 UNIT/ML Solution 9/20/2017 Active   metoclopramide (REGLAN) 10 MG Tab 9/19/2017 Active                ALLERGIES  Allergies   Allergen Reactions   • Pcn [Penicillins] Shortness of Breath and Swelling     Per patient's Mom, patient tolerates Keflex   • Promethazine Vomiting   • Lisinopril      Per historical: Reported pedal swelling. No facial/angioedema or rash nor respiratory symptoms.        PHYSICAL EXAM  VITAL SIGNS: /74   Pulse (!) 129   Temp 36.4 °C (97.6 °F)   Resp 16   Ht 1.651 m (5' 5\")   Wt 68.7 kg (151 lb 7.3 oz)   SpO2 98%   BMI 25.20 kg/m²   Vitals reviewed.  Constitutional: UltraSo respon ill-appearing, moderate to severe distress  HENT: Normocephalic, Atraumatic, Bilateral external ears normal, Oropharynx very dry, No oral exudates, Nose normal.   Eyes: PERRL, EOMI, Conjunctiva normal, No discharge.   Neck: Normal range of motion, No tenderness, Supple, No stridor.   Cardiovascular: Tachycardic, Normal rhythm, No murmurs, No rubs, No gallops.   Thorax & Lungs: Normal breath sounds, No respiratory distress, No wheezing, No chest tenderness.   Abdomen: Bowel sounds normal, Soft, No tenderness  Skin: Warm, Dry, No erythema, No rash.   Back: No tenderness, No CVA tenderness.   Musculoskeletal: Good range of motion in all major joints. No edema. No tenderness to palpation or major deformities noted.   Neurologic: Somnolent, but arousable.  Normal motor function, Normal sensory function, No focal deficits noted.   Psychiatric: Affect appropriate for situation, slow to response. "     LABS  Results for orders placed or performed during the hospital encounter of 09/20/17   CBC WITH DIFFERENTIAL   Result Value Ref Range    WBC 12.0 (H) 4.8 - 10.8 K/uL    RBC 5.40 4.20 - 5.40 M/uL    Hemoglobin 15.9 12.0 - 16.0 g/dL    Hematocrit 47.6 (H) 37.0 - 47.0 %    MCV 88.1 81.4 - 97.8 fL    MCH 29.4 27.0 - 33.0 pg    MCHC 33.4 (L) 33.6 - 35.0 g/dL    RDW 43.3 35.9 - 50.0 fL    Platelet Count 271 164 - 446 K/uL    MPV 10.4 9.0 - 12.9 fL    Nucleated RBC 0.00 /100 WBC    NRBC (Absolute) 0.00 K/uL    Neutrophils-Polys 60.50 44.00 - 72.00 %    Lymphocytes 30.70 22.00 - 41.00 %    Monocytes 1.70 0.00 - 13.40 %    Eosinophils 0.90 0.00 - 6.90 %    Basophils 0.00 0.00 - 1.80 %    Neutrophils (Absolute) 7.90 (H) 2.00 - 7.15 K/uL    Lymphs (Absolute) 3.68 1.00 - 4.80 K/uL    Monos (Absolute) 0.20 0.00 - 0.85 K/uL    Eos (Absolute) 0.11 0.00 - 0.51 K/uL    Baso (Absolute) 0.00 0.00 - 0.12 K/uL   COMP METABOLIC PANEL   Result Value Ref Range    Sodium 133 (L) 135 - 145 mmol/L    Potassium 4.2 3.6 - 5.5 mmol/L    Chloride 103 96 - 112 mmol/L    Co2 <5 (LL) 20 - 33 mmol/L    Anion Gap 25.0 (H) 0.0 - 11.9    Glucose 638 (HH) 65 - 99 mg/dL    Bun 17 8 - 22 mg/dL    Creatinine 1.00 0.50 - 1.40 mg/dL    Calcium 9.4 8.5 - 10.5 mg/dL    AST(SGOT) 14 12 - 45 U/L    ALT(SGPT) 12 2 - 50 U/L    Alkaline Phosphatase 198 (H) 30 - 99 U/L    Total Bilirubin 0.4 0.1 - 1.5 mg/dL    Albumin 4.3 3.2 - 4.9 g/dL    Total Protein 7.9 6.0 - 8.2 g/dL    Globulin 3.6 (H) 1.9 - 3.5 g/dL    A-G Ratio 1.2 g/dL   LIPASE   Result Value Ref Range    Lipase 14 11 - 82 U/L   BETA-HYDROXYBUTYRIC ACID   Result Value Ref Range    beta-Hydroxybutyric Acid >8.00 (H) 0.02 - 0.27 mmol/L   LACTIC ACID   Result Value Ref Range    Lactic Acid 1.5 0.5 - 2.0 mmol/L   ESTIMATED GFR   Result Value Ref Range    GFR If African American >60 >60 mL/min/1.73 m 2    GFR If Non African American >60 >60 mL/min/1.73 m 2   MORPHOLOGY   Result Value Ref Range    RBC  Morphology Normal    PERIPHERAL SMEAR REVIEW   Result Value Ref Range    Peripheral Smear Review see below    DIFFERENTIAL MANUAL   Result Value Ref Range    Bands-Stabs 5.30 0.00 - 10.00 %    Metamyelocytes 0.90 %    Manual Diff Status PERFORMED    PLATELET ESTIMATE   Result Value Ref Range    Plt Estimation Normal    ACCU-CHEK GLUCOSE   Result Value Ref Range    Glucose - Accu-Ck 558 (HH) 65 - 99 mg/dL   All labs reviewed by me.    RADIOLOGY  DX-CHEST-PORTABLE (1 VIEW)   Final Result      Interval placement of a left IJ central line without evidence of complication.      The radiologist's interpretation of all radiological studies have been reviewed by me.    Central Line Placement Procedure Note  Indication: vascular access    Consent: The patient provided verbal consent for this procedure.    Procedure: The patient was positioned appropriately and the skin over the left internal jugular vein was prepped with betadine and draped in a sterile fashion. Local anesthesia was obtained by infiltration using 1% Lidocaine without epinephrine.  A large bore needle was used to identify the vein.  A guide wire was then inserted into the vein through the needle. A triple lumen catheter was then inserted into the vessel over the guide wire using the Seldinger technique. All ports showed good, free flowing blood return and were flushed with saline solution.  The catheter was then securely fastened to the skin with sutures and covered with a sterile dressing.  A post procedure X-ray was ordered and showed good line position.    The patient tolerated the procedure well.    Complications: None     COURSE & MEDICAL DECISION MAKING  Pertinent Labs & Imaging studies reviewed. (See chart for details)    Obtained and reviewed past medical records from from previous visit    I was acutely called to see and evaluate the patient at bedside.     10:03 AM The patient presents with *I, blood sugar, altered mental status, tachypnea and the  differential diagnosis includes but is not limited to diabetic ketoacidosis, dehydration, nausea, abnormality or sepsis. Ordered for beta-hydroxybutyric acid, CBC, CMP, lipase, differential manual, peripheral smear review, platelet estimate, morphology, estimated GFR, POC urinalysis, and POC urine preganncy to evaluate. Patient will be treated with morphine 4mg, Zofran 4mg, and normal saline 1L infusion for presumed DKA. I discussed the risks and benefits of Central Line Procedure with the patient, which she understands and agrees with.     10:16 AM Performed Central Line Procedure, see above note for more details. Ordered chest Xray.     10:47 AM I reviewed the patient's xray to confirm central line. She will receive another bolus.  Patient is hydrated aggressively.  The hospitalist was consulted for admission.    11:19 AM Reviewed the patient's lab and imaging results as shown above. Nursing staff informed me the patient's blood glucose is 638.     11:26 AM I discussed the patient's case and the above findings with Dr. Douglass (Hospitalist) who agreed to admit the patient.         DISPOSITION:  Patient will be admitted to Dr. Douglass in critical  condition.     FINAL IMPRESSION  1. DKA, type 1, not at goal (CMS-HCC)    2. Central line placement by me  3.  Critical care time of 35 minutes not including procedure     PATTY, Ya May (Annia), am scribing for, and in the presence of, Hi Fermin M.D..    Electronically signed by: Ya May (Annia), 9/20/2017    Hi BRAMBILA M.D. personally performed the services described in this documentation, as scribed by Ya May in my presence, and it is both accurate and complete.    The note accurately reflects work and decisions made by me.  Hi Fermin  9/20/2017  3:35 PM

## 2017-09-21 PROBLEM — E10.10 DM (DIABETES MELLITUS) TYPE 1, UNCONTROLLED, WITH KETOACIDOSIS (HCC): Status: ACTIVE | Noted: 2017-09-20

## 2017-09-21 LAB
ALBUMIN SERPL BCP-MCNC: 3.5 G/DL (ref 3.2–4.9)
ALBUMIN/GLOB SERPL: 1.4 G/DL
ALP SERPL-CCNC: 122 U/L (ref 30–99)
ALT SERPL-CCNC: 8 U/L (ref 2–50)
ANION GAP SERPL CALC-SCNC: 11 MMOL/L (ref 0–11.9)
ANION GAP SERPL CALC-SCNC: 3 MMOL/L (ref 0–11.9)
ANION GAP SERPL CALC-SCNC: 4 MMOL/L (ref 0–11.9)
ANION GAP SERPL CALC-SCNC: 6 MMOL/L (ref 0–11.9)
ANION GAP SERPL CALC-SCNC: 8 MMOL/L (ref 0–11.9)
AST SERPL-CCNC: 11 U/L (ref 12–45)
BASOPHILS # BLD AUTO: 0.4 % (ref 0–1.8)
BASOPHILS # BLD: 0.06 K/UL (ref 0–0.12)
BILIRUB SERPL-MCNC: 0.4 MG/DL (ref 0.1–1.5)
BUN SERPL-MCNC: 12 MG/DL (ref 8–22)
BUN SERPL-MCNC: 14 MG/DL (ref 8–22)
BUN SERPL-MCNC: 15 MG/DL (ref 8–22)
BUN SERPL-MCNC: 15 MG/DL (ref 8–22)
BUN SERPL-MCNC: 16 MG/DL (ref 8–22)
CALCIUM SERPL-MCNC: 7.5 MG/DL (ref 8.5–10.5)
CALCIUM SERPL-MCNC: 7.6 MG/DL (ref 8.5–10.5)
CALCIUM SERPL-MCNC: 7.8 MG/DL (ref 8.5–10.5)
CALCIUM SERPL-MCNC: 7.8 MG/DL (ref 8.5–10.5)
CALCIUM SERPL-MCNC: 8.3 MG/DL (ref 8.5–10.5)
CHLORIDE SERPL-SCNC: 122 MMOL/L (ref 96–112)
CHLORIDE SERPL-SCNC: 123 MMOL/L (ref 96–112)
CHLORIDE SERPL-SCNC: 123 MMOL/L (ref 96–112)
CHLORIDE SERPL-SCNC: 125 MMOL/L (ref 96–112)
CHLORIDE SERPL-SCNC: 126 MMOL/L (ref 96–112)
CO2 SERPL-SCNC: 10 MMOL/L (ref 20–33)
CO2 SERPL-SCNC: 13 MMOL/L (ref 20–33)
CO2 SERPL-SCNC: 15 MMOL/L (ref 20–33)
CO2 SERPL-SCNC: 17 MMOL/L (ref 20–33)
CO2 SERPL-SCNC: 18 MMOL/L (ref 20–33)
CREAT SERPL-MCNC: 0.42 MG/DL (ref 0.5–1.4)
CREAT SERPL-MCNC: 0.52 MG/DL (ref 0.5–1.4)
CREAT SERPL-MCNC: 0.58 MG/DL (ref 0.5–1.4)
CREAT SERPL-MCNC: 0.59 MG/DL (ref 0.5–1.4)
CREAT SERPL-MCNC: 0.69 MG/DL (ref 0.5–1.4)
EOSINOPHIL # BLD AUTO: 0.01 K/UL (ref 0–0.51)
EOSINOPHIL NFR BLD: 0.1 % (ref 0–6.9)
ERYTHROCYTE [DISTWIDTH] IN BLOOD BY AUTOMATED COUNT: 41.6 FL (ref 35.9–50)
GFR SERPL CREATININE-BSD FRML MDRD: >60 ML/MIN/1.73 M 2
GLOBULIN SER CALC-MCNC: 2.5 G/DL (ref 1.9–3.5)
GLUCOSE BLD-MCNC: 104 MG/DL (ref 65–99)
GLUCOSE BLD-MCNC: 107 MG/DL (ref 65–99)
GLUCOSE BLD-MCNC: 110 MG/DL (ref 65–99)
GLUCOSE BLD-MCNC: 110 MG/DL (ref 65–99)
GLUCOSE BLD-MCNC: 115 MG/DL (ref 65–99)
GLUCOSE BLD-MCNC: 116 MG/DL (ref 65–99)
GLUCOSE BLD-MCNC: 119 MG/DL (ref 65–99)
GLUCOSE BLD-MCNC: 122 MG/DL (ref 65–99)
GLUCOSE BLD-MCNC: 129 MG/DL (ref 65–99)
GLUCOSE BLD-MCNC: 136 MG/DL (ref 65–99)
GLUCOSE BLD-MCNC: 138 MG/DL (ref 65–99)
GLUCOSE BLD-MCNC: 83 MG/DL (ref 65–99)
GLUCOSE BLD-MCNC: 93 MG/DL (ref 65–99)
GLUCOSE BLD-MCNC: 94 MG/DL (ref 65–99)
GLUCOSE BLD-MCNC: 95 MG/DL (ref 65–99)
GLUCOSE SERPL-MCNC: 119 MG/DL (ref 65–99)
GLUCOSE SERPL-MCNC: 125 MG/DL (ref 65–99)
GLUCOSE SERPL-MCNC: 146 MG/DL (ref 65–99)
GLUCOSE SERPL-MCNC: 154 MG/DL (ref 65–99)
GLUCOSE SERPL-MCNC: 95 MG/DL (ref 65–99)
HCT VFR BLD AUTO: 36.5 % (ref 37–47)
HGB BLD-MCNC: 12.7 G/DL (ref 12–16)
IMM GRANULOCYTES # BLD AUTO: 0.34 K/UL (ref 0–0.11)
IMM GRANULOCYTES NFR BLD AUTO: 2.5 % (ref 0–0.9)
LYMPHOCYTES # BLD AUTO: 2.2 K/UL (ref 1–4.8)
LYMPHOCYTES NFR BLD: 16.3 % (ref 22–41)
MAGNESIUM SERPL-MCNC: 1.6 MG/DL (ref 1.5–2.5)
MAGNESIUM SERPL-MCNC: 2.5 MG/DL (ref 1.5–2.5)
MCH RBC QN AUTO: 29 PG (ref 27–33)
MCHC RBC AUTO-ENTMCNC: 34.2 G/DL (ref 33.6–35)
MCV RBC AUTO: 84.7 FL (ref 81.4–97.8)
MONOCYTES # BLD AUTO: 1.26 K/UL (ref 0–0.85)
MONOCYTES NFR BLD AUTO: 9.3 % (ref 0–13.4)
NEUTROPHILS # BLD AUTO: 9.61 K/UL (ref 2–7.15)
NEUTROPHILS NFR BLD: 71.4 % (ref 44–72)
NRBC # BLD AUTO: 0 K/UL
NRBC BLD AUTO-RTO: 0 /100 WBC
PHOSPHATE SERPL-MCNC: 1.5 MG/DL (ref 2.5–4.5)
PHOSPHATE SERPL-MCNC: 2.2 MG/DL (ref 2.5–4.5)
PHOSPHATE SERPL-MCNC: <1 MG/DL (ref 2.5–4.5)
PLATELET # BLD AUTO: 261 K/UL (ref 164–446)
PMV BLD AUTO: 9.6 FL (ref 9–12.9)
POTASSIUM SERPL-SCNC: 3.2 MMOL/L (ref 3.6–5.5)
POTASSIUM SERPL-SCNC: 3.4 MMOL/L (ref 3.6–5.5)
POTASSIUM SERPL-SCNC: 3.5 MMOL/L (ref 3.6–5.5)
PROT SERPL-MCNC: 6 G/DL (ref 6–8.2)
RBC # BLD AUTO: 4.31 M/UL (ref 4.2–5.4)
SODIUM SERPL-SCNC: 143 MMOL/L (ref 135–145)
SODIUM SERPL-SCNC: 144 MMOL/L (ref 135–145)
SODIUM SERPL-SCNC: 144 MMOL/L (ref 135–145)
SODIUM SERPL-SCNC: 146 MMOL/L (ref 135–145)
SODIUM SERPL-SCNC: 147 MMOL/L (ref 135–145)
WBC # BLD AUTO: 13.5 K/UL (ref 4.8–10.8)

## 2017-09-21 PROCEDURE — 82962 GLUCOSE BLOOD TEST: CPT | Mod: 91

## 2017-09-21 PROCEDURE — 99291 CRITICAL CARE FIRST HOUR: CPT | Performed by: HOSPITALIST

## 2017-09-21 PROCEDURE — 700101 HCHG RX REV CODE 250: Performed by: HOSPITALIST

## 2017-09-21 PROCEDURE — 700105 HCHG RX REV CODE 258: Performed by: HOSPITALIST

## 2017-09-21 PROCEDURE — 84100 ASSAY OF PHOSPHORUS: CPT | Mod: 91

## 2017-09-21 PROCEDURE — 85025 COMPLETE CBC W/AUTO DIFF WBC: CPT

## 2017-09-21 PROCEDURE — A9270 NON-COVERED ITEM OR SERVICE: HCPCS | Performed by: HOSPITALIST

## 2017-09-21 PROCEDURE — 700111 HCHG RX REV CODE 636 W/ 250 OVERRIDE (IP): Performed by: HOSPITALIST

## 2017-09-21 PROCEDURE — 80053 COMPREHEN METABOLIC PANEL: CPT

## 2017-09-21 PROCEDURE — 700102 HCHG RX REV CODE 250 W/ 637 OVERRIDE(OP): Performed by: HOSPITALIST

## 2017-09-21 PROCEDURE — 80048 BASIC METABOLIC PNL TOTAL CA: CPT | Mod: 91

## 2017-09-21 PROCEDURE — 770022 HCHG ROOM/CARE - ICU (200)

## 2017-09-21 PROCEDURE — 83735 ASSAY OF MAGNESIUM: CPT

## 2017-09-21 RX ORDER — POTASSIUM CHLORIDE 29.8 MG/ML
40 INJECTION INTRAVENOUS ONCE
Status: COMPLETED | OUTPATIENT
Start: 2017-09-21 | End: 2017-09-21

## 2017-09-21 RX ORDER — SODIUM CHLORIDE 9 MG/ML
INJECTION, SOLUTION INTRAVENOUS CONTINUOUS
Status: DISCONTINUED | OUTPATIENT
Start: 2017-09-21 | End: 2017-09-21

## 2017-09-21 RX ORDER — MAGNESIUM SULFATE HEPTAHYDRATE 40 MG/ML
2 INJECTION, SOLUTION INTRAVENOUS ONCE
Status: COMPLETED | OUTPATIENT
Start: 2017-09-21 | End: 2017-09-21

## 2017-09-21 RX ORDER — POTASSIUM CHLORIDE 14.9 MG/ML
20 INJECTION INTRAVENOUS ONCE
Status: DISCONTINUED | OUTPATIENT
Start: 2017-09-21 | End: 2017-09-21

## 2017-09-21 RX ORDER — KETOROLAC TROMETHAMINE 30 MG/ML
15 INJECTION, SOLUTION INTRAMUSCULAR; INTRAVENOUS EVERY 6 HOURS PRN
Status: DISCONTINUED | OUTPATIENT
Start: 2017-09-21 | End: 2017-09-24 | Stop reason: HOSPADM

## 2017-09-21 RX ORDER — SODIUM CHLORIDE 9 MG/ML
INJECTION, SOLUTION INTRAVENOUS CONTINUOUS
Status: DISCONTINUED | OUTPATIENT
Start: 2017-09-21 | End: 2017-09-24 | Stop reason: HOSPADM

## 2017-09-21 RX ORDER — INSULIN GLARGINE 100 [IU]/ML
30 INJECTION, SOLUTION SUBCUTANEOUS TWICE DAILY
Status: DISCONTINUED | OUTPATIENT
Start: 2017-09-21 | End: 2017-09-23

## 2017-09-21 RX ADMIN — SODIUM CHLORIDE: 9 INJECTION, SOLUTION INTRAVENOUS at 22:07

## 2017-09-21 RX ADMIN — MAGNESIUM SULFATE IN WATER 2 G: 40 INJECTION, SOLUTION INTRAVENOUS at 09:08

## 2017-09-21 RX ADMIN — ENOXAPARIN SODIUM 40 MG: 100 INJECTION SUBCUTANEOUS at 07:59

## 2017-09-21 RX ADMIN — SODIUM CHLORIDE: 9 INJECTION, SOLUTION INTRAVENOUS at 07:59

## 2017-09-21 RX ADMIN — INSULIN GLARGINE 30 UNITS: 100 INJECTION, SOLUTION SUBCUTANEOUS at 19:53

## 2017-09-21 RX ADMIN — ONDANSETRON 4 MG: 2 INJECTION INTRAMUSCULAR; INTRAVENOUS at 16:39

## 2017-09-21 RX ADMIN — SODIUM CHLORIDE 5 UNITS/HR: 9 INJECTION, SOLUTION INTRAVENOUS at 03:48

## 2017-09-21 RX ADMIN — DEXTROSE AND SODIUM CHLORIDE: 10; .45 INJECTION, SOLUTION INTRAVENOUS at 06:07

## 2017-09-21 RX ADMIN — KETOROLAC TROMETHAMINE 15 MG: 30 INJECTION, SOLUTION INTRAMUSCULAR at 16:40

## 2017-09-21 RX ADMIN — POTASSIUM CHLORIDE 40 MEQ: 29.8 INJECTION, SOLUTION INTRAVENOUS at 14:00

## 2017-09-21 RX ADMIN — POTASSIUM CHLORIDE 40 MEQ: 29.8 INJECTION, SOLUTION INTRAVENOUS at 08:00

## 2017-09-21 RX ADMIN — POTASSIUM PHOSPHATE, MONOBASIC AND POTASSIUM PHOSPHATE, DIBASIC 15 MMOL: 224; 236 INJECTION, SOLUTION INTRAVENOUS at 15:20

## 2017-09-21 RX ADMIN — ONDANSETRON 4 MG: 2 INJECTION INTRAMUSCULAR; INTRAVENOUS at 02:07

## 2017-09-21 RX ADMIN — ONDANSETRON 4 MG: 2 INJECTION INTRAMUSCULAR; INTRAVENOUS at 10:40

## 2017-09-21 RX ADMIN — MAGNESIUM SULFATE IN WATER 2 G: 40 INJECTION, SOLUTION INTRAVENOUS at 03:27

## 2017-09-21 RX ADMIN — STANDARDIZED SENNA CONCENTRATE AND DOCUSATE SODIUM 2 TABLET: 8.6; 5 TABLET, FILM COATED ORAL at 22:07

## 2017-09-21 RX ADMIN — MORPHINE SULFATE 2 MG: 4 INJECTION INTRAVENOUS at 02:07

## 2017-09-21 RX ADMIN — KETOROLAC TROMETHAMINE 15 MG: 30 INJECTION, SOLUTION INTRAMUSCULAR at 10:40

## 2017-09-21 RX ADMIN — MORPHINE SULFATE 2 MG: 4 INJECTION INTRAVENOUS at 06:07

## 2017-09-21 RX ADMIN — POTASSIUM PHOSPHATE, MONOBASIC AND POTASSIUM PHOSPHATE, DIBASIC 30 MMOL: 224; 236 INJECTION, SOLUTION INTRAVENOUS at 03:32

## 2017-09-21 RX ADMIN — POTASSIUM CHLORIDE 40 MEQ: 29.8 INJECTION, SOLUTION INTRAVENOUS at 19:00

## 2017-09-21 RX ADMIN — ATORVASTATIN CALCIUM 20 MG: 20 TABLET, FILM COATED ORAL at 22:07

## 2017-09-21 RX ADMIN — ONDANSETRON 4 MG: 2 INJECTION INTRAMUSCULAR; INTRAVENOUS at 06:07

## 2017-09-21 ASSESSMENT — ENCOUNTER SYMPTOMS
FEVER: 0
WEAKNESS: 0
VOMITING: 1
EYE PAIN: 0
FALLS: 0
EYE REDNESS: 0
FOCAL WEAKNESS: 0
STRIDOR: 0
BACK PAIN: 0
ABDOMINAL PAIN: 1
NAUSEA: 1
HEADACHES: 0
HALLUCINATIONS: 0
BLURRED VISION: 0
SEIZURES: 0
PALPITATIONS: 0
SPEECH CHANGE: 0
FLANK PAIN: 0
BLOOD IN STOOL: 0
DIARRHEA: 0
EYE DISCHARGE: 0
ORTHOPNEA: 0
MEMORY LOSS: 0
NECK PAIN: 0
SPUTUM PRODUCTION: 0
SHORTNESS OF BREATH: 0
NERVOUS/ANXIOUS: 0
LOSS OF CONSCIOUSNESS: 0
COUGH: 0
HEMOPTYSIS: 0

## 2017-09-21 ASSESSMENT — PAIN SCALES - GENERAL
PAINLEVEL_OUTOF10: 7
PAINLEVEL_OUTOF10: 5
PAINLEVEL_OUTOF10: 3
PAINLEVEL_OUTOF10: 4
PAINLEVEL_OUTOF10: 3
PAINLEVEL_OUTOF10: 1
PAINLEVEL_OUTOF10: 8
PAINLEVEL_OUTOF10: 2
PAINLEVEL_OUTOF10: 0
PAINLEVEL_OUTOF10: 3
PAINLEVEL_OUTOF10: 7

## 2017-09-21 ASSESSMENT — LIFESTYLE VARIABLES: SUBSTANCE_ABUSE: 0

## 2017-09-21 NOTE — CARE PLAN
Problem: Safety  Goal: Will remain free from falls    Intervention: Assess risk factors for falls   09/21/17 0800   OTHER   Fall Risk Risk to Fall - 0 - 1 point   Risk for Injury-Any positive answers results in the pt being at high risk for fall related injury Not Applicable   Mobility Status Assessment 1-1 Healthcare Provider Required for Assistance with Ambulation & Transfer   History of fall 0   Pt Calls for Assistance Yes     Intervention: Implement fall precautions   09/21/17 0800 09/21/17 1000   OTHER   Environmental Precautions --  Treaded Slipper Socks on Patient   Bedrails Alternative to Bedrails Used --    Chair/Bed Strip Alarm Yes - Alarm On --    Bed Alarm (Built in - for ICU ONLY) Yes - Alarm On --          Problem: Mobility  Goal: Risk for activity intolerance will decrease    Intervention: Assess and monitor signs of activity intolerance  Pt up with stand by assist, tolerates well. Rest periods provided as needed.

## 2017-09-21 NOTE — PROGRESS NOTES
Daina from Lab called with critical result of Phos <1 at 0312. Critical lab result read back to Daina. Pharmacy called to notify that K-phos would be mixed and tubed to unit per DKA protocol.  Dr. Chahal notified of critical lab result at 0319.  Critical lab result read back by Dr. Chahal.

## 2017-09-21 NOTE — PROGRESS NOTES
Expected critical lab of CO2 7 called by Daina in the lab. This value is improved over previous results. Will continue to monitor.

## 2017-09-21 NOTE — PROGRESS NOTES
Spoke to Dr. Campuzano to notify that patient's BG decreased to 83 while insulin gtt on 5 units/hr. Received order to decrease insulin gtt to 3 units/hr and start NS @ 50 in addition to D10 1/2NS.

## 2017-09-21 NOTE — PROGRESS NOTES
[facility]ist Progress Note    Date of Service: 2017    Chief Complaint  27 y.o. female admitted 2017 with DKA  Interval Problem Update  Seen in the iCU remains on insulin drip at 3 units    Consultants/Specialty  none    Disposition  TBD        Review of Systems   Constitutional: Positive for malaise/fatigue. Negative for fever.   HENT: Negative for congestion, ear discharge and nosebleeds.    Eyes: Negative for blurred vision, pain, discharge and redness.   Respiratory: Negative for cough, hemoptysis, sputum production, shortness of breath and stridor.    Cardiovascular: Negative for chest pain, palpitations, orthopnea and leg swelling.   Gastrointestinal: Positive for abdominal pain, nausea and vomiting. Negative for blood in stool, diarrhea and melena.   Genitourinary: Negative for dysuria, flank pain and hematuria.   Musculoskeletal: Negative for back pain, falls, joint pain and neck pain.   Skin: Negative.    Neurological: Negative for speech change, focal weakness, seizures, loss of consciousness, weakness and headaches.   Psychiatric/Behavioral: Negative for hallucinations, memory loss and substance abuse. The patient is not nervous/anxious.    All other systems reviewed and are negative.     Physical Exam  Laboratory/Imaging   Hemodynamics  Temp (24hrs), Av.8 °C (98.3 °F), Min:36.4 °C (97.6 °F), Max:37.1 °C (98.8 °F)   Temperature: 37.1 °C (98.7 °F)  Pulse  Av.8  Min: 109  Max: 146 Heart Rate (Monitored): (!) 113  Blood Pressure: 125/74, NIBP: 104/66      Respiratory      Respiration: 14, Pulse Oximetry: 97 %, O2 Daily Delivery Respiratory : Room Air with O2 Available     Work Of Breathing / Effort: Moderate;Tachypnea       Fluids    Intake/Output Summary (Last 24 hours) at 17 0729  Last data filed at 17 0600   Gross per 24 hour   Intake           3670.6 ml   Output             2575 ml   Net           1095.6 ml       Nutrition  Orders Placed This Encounter   Procedures    • Diet NPO     Standing Status:   Standing     Number of Occurrences:   1     Order Specific Question:   Restrict to:     Answer:   Ice Chips [2]     Physical Exam   Constitutional: She is oriented to person, place, and time. She appears well-developed and well-nourished.   HENT:   Head: Normocephalic and atraumatic.   Right Ear: External ear normal.   Left Ear: External ear normal.   Mouth/Throat: No oropharyngeal exudate.   Eyes: Conjunctivae are normal. Right eye exhibits no discharge. Left eye exhibits no discharge. No scleral icterus.   Neck: Neck supple. No JVD present. No tracheal deviation present.   Cardiovascular: Normal rate and regular rhythm.  Exam reveals no gallop and no friction rub.    No murmur heard.  Pulmonary/Chest: Effort normal and breath sounds normal. No stridor. No respiratory distress. She has no wheezes. She has no rales. She exhibits no tenderness.   Abdominal: Soft. Bowel sounds are normal. She exhibits no distension. There is tenderness (mild generalized). There is no rebound.   Musculoskeletal: She exhibits no edema or tenderness.   Neurological: She is oriented to person, place, and time. No cranial nerve deficit. She exhibits normal muscle tone.   Skin: Skin is warm and dry. She is not diaphoretic. No cyanosis. Nails show no clubbing.   Psychiatric: Her speech is delayed. She is slowed.   Nursing note and vitals reviewed.      Recent Labs      09/20/17   1015  09/21/17   0200   WBC  12.0*  13.5*   RBC  5.40  4.31   HEMOGLOBIN  15.9  12.7   HEMATOCRIT  47.6*  36.5*   MCV  88.1  84.7   MCH  29.4  29.0   MCHC  33.4*  34.2   RDW  43.3  41.6   PLATELETCT  271  261   MPV  10.4  9.6     Recent Labs      09/20/17   2204  09/21/17   0200  09/21/17   0550   SODIUM  145  147*  146*   POTASSIUM  3.5*  3.5*  3.2*   CHLORIDE  123*  126*  125*   CO2  7*  10*  13*   GLUCOSE  144*  119*  95   BUN  17  16  15   CREATININE  0.64  0.69  0.58   CALCIUM  8.0*  8.3*  7.8*                       Assessment/Plan     * DKA (diabetic ketoacidoses) (CMS-[facility])- (present on admission)   Assessment & Plan    Continue insulin drip, monitor electrolytes and CBG's          DM (diabetes mellitus) type 1, uncontrolled, with ketoacidosis (CMS-HCC)- (present on admission)   Assessment & Plan    Reinforced compliance and need for close outpt f/u          High anion gap metabolic acidosis- (present on admission)   Assessment & Plan    Secondary to  DKA, continue insulin drip and IVF and monitor BMP        Leukocytosis- (present on admission)   Assessment & Plan      likely reactive, no clinical signs of infection, monitor cbc        Hyponatremia- (present on admission)   Assessment & Plan    resolved        Hypokalemia- (present on admission)   Assessment & Plan    Replete and monitor         >Patient is critically ill.   >The patient is having :DKA  >The vital organ system that is effected is the: Endocrine  >If untreated there is a high chance of deterioration into: Death  >The critical care that I am providing today is: Insulin drip management and electrolyte management  >The critical care that has been undertaken is medically complex.   >There has been no overlap in critical care time.   Critical care time not including procedures, no overlap: 38 minutes        Reviewed items::  Labs reviewed and Medications reviewed  Pitt catheter::  No Pitt  Central line in place:  Need for access  DVT prophylaxis pharmacological::  Enoxaparin (Lovenox)

## 2017-09-21 NOTE — CARE PLAN
Problem: Communication  Goal: The ability to communicate needs accurately and effectively will improve  Outcome: PROGRESSING AS EXPECTED  Patient communicates needs accurately and effectively.    Problem: Mobility  Goal: Risk for activity intolerance will decrease  Outcome: PROGRESSING AS EXPECTED  Patient up to BS with minimal assist, tolerates well.

## 2017-09-21 NOTE — ASSESSMENT & PLAN NOTE
Reinforced compliance and need for close outpt f/u     Continue Lantus but will decrease her evening dose given a.m. hypoglycemia and continue pre-meal Humalog and insulin sliding scale monitor blood sugars and adjust accordingly

## 2017-09-22 PROBLEM — E87.6 HYPOKALEMIA: Status: RESOLVED | Noted: 2017-05-05 | Resolved: 2017-09-22

## 2017-09-22 PROBLEM — E87.20 METABOLIC ACIDOSIS: Status: ACTIVE | Noted: 2017-09-22

## 2017-09-22 PROBLEM — E87.20 METABOLIC ACIDOSIS: Status: RESOLVED | Noted: 2017-09-22 | Resolved: 2017-09-22

## 2017-09-22 PROBLEM — E87.1 HYPONATREMIA: Status: RESOLVED | Noted: 2017-09-20 | Resolved: 2017-09-22

## 2017-09-22 PROBLEM — E87.29 HIGH ANION GAP METABOLIC ACIDOSIS: Status: RESOLVED | Noted: 2017-09-20 | Resolved: 2017-09-22

## 2017-09-22 LAB
ANION GAP SERPL CALC-SCNC: 8 MMOL/L (ref 0–11.9)
BUN SERPL-MCNC: 10 MG/DL (ref 8–22)
CALCIUM SERPL-MCNC: 7.7 MG/DL (ref 8.5–10.5)
CHLORIDE SERPL-SCNC: 120 MMOL/L (ref 96–112)
CO2 SERPL-SCNC: 15 MMOL/L (ref 20–33)
CREAT SERPL-MCNC: 0.45 MG/DL (ref 0.5–1.4)
GFR SERPL CREATININE-BSD FRML MDRD: >60 ML/MIN/1.73 M 2
GLUCOSE BLD-MCNC: 234 MG/DL (ref 65–99)
GLUCOSE BLD-MCNC: 237 MG/DL (ref 65–99)
GLUCOSE BLD-MCNC: 265 MG/DL (ref 65–99)
GLUCOSE BLD-MCNC: 356 MG/DL (ref 65–99)
GLUCOSE SERPL-MCNC: 254 MG/DL (ref 65–99)
PHOSPHATE SERPL-MCNC: 1.3 MG/DL (ref 2.5–4.5)
POTASSIUM SERPL-SCNC: 3.7 MMOL/L (ref 3.6–5.5)
SODIUM SERPL-SCNC: 143 MMOL/L (ref 135–145)

## 2017-09-22 PROCEDURE — 700101 HCHG RX REV CODE 250: Performed by: HOSPITALIST

## 2017-09-22 PROCEDURE — 82962 GLUCOSE BLOOD TEST: CPT | Mod: 91

## 2017-09-22 PROCEDURE — 700102 HCHG RX REV CODE 250 W/ 637 OVERRIDE(OP): Performed by: HOSPITALIST

## 2017-09-22 PROCEDURE — 700105 HCHG RX REV CODE 258: Performed by: HOSPITALIST

## 2017-09-22 PROCEDURE — 84100 ASSAY OF PHOSPHORUS: CPT

## 2017-09-22 PROCEDURE — 99232 SBSQ HOSP IP/OBS MODERATE 35: CPT | Performed by: HOSPITALIST

## 2017-09-22 PROCEDURE — 700111 HCHG RX REV CODE 636 W/ 250 OVERRIDE (IP): Performed by: HOSPITALIST

## 2017-09-22 PROCEDURE — 80048 BASIC METABOLIC PNL TOTAL CA: CPT

## 2017-09-22 PROCEDURE — 770001 HCHG ROOM/CARE - MED/SURG/GYN PRIV*

## 2017-09-22 PROCEDURE — A9270 NON-COVERED ITEM OR SERVICE: HCPCS | Performed by: HOSPITALIST

## 2017-09-22 RX ADMIN — SODIUM CHLORIDE: 9 INJECTION, SOLUTION INTRAVENOUS at 16:45

## 2017-09-22 RX ADMIN — POTASSIUM PHOSPHATE, MONOBASIC AND POTASSIUM PHOSPHATE, DIBASIC 30 MMOL: 224; 236 INJECTION, SOLUTION INTRAVENOUS at 09:58

## 2017-09-22 RX ADMIN — ENOXAPARIN SODIUM 40 MG: 100 INJECTION SUBCUTANEOUS at 07:49

## 2017-09-22 RX ADMIN — SODIUM CHLORIDE: 9 INJECTION, SOLUTION INTRAVENOUS at 07:51

## 2017-09-22 RX ADMIN — ONDANSETRON 4 MG: 2 INJECTION INTRAMUSCULAR; INTRAVENOUS at 07:53

## 2017-09-22 RX ADMIN — INSULIN LISPRO 5 UNITS: 100 INJECTION, SOLUTION INTRAVENOUS; SUBCUTANEOUS at 16:44

## 2017-09-22 RX ADMIN — INSULIN LISPRO 8 UNITS: 100 INJECTION, SOLUTION INTRAVENOUS; SUBCUTANEOUS at 21:39

## 2017-09-22 RX ADMIN — ATORVASTATIN CALCIUM 20 MG: 20 TABLET, FILM COATED ORAL at 21:36

## 2017-09-22 RX ADMIN — INSULIN LISPRO 3 UNITS: 100 INJECTION, SOLUTION INTRAVENOUS; SUBCUTANEOUS at 06:56

## 2017-09-22 RX ADMIN — INSULIN LISPRO 3 UNITS: 100 INJECTION, SOLUTION INTRAVENOUS; SUBCUTANEOUS at 11:37

## 2017-09-22 RX ADMIN — ONDANSETRON 4 MG: 2 INJECTION INTRAMUSCULAR; INTRAVENOUS at 21:48

## 2017-09-22 RX ADMIN — INSULIN GLARGINE 30 UNITS: 100 INJECTION, SOLUTION SUBCUTANEOUS at 07:50

## 2017-09-22 RX ADMIN — INSULIN GLARGINE 30 UNITS: 100 INJECTION, SOLUTION SUBCUTANEOUS at 21:36

## 2017-09-22 RX ADMIN — ONDANSETRON 4 MG: 2 INJECTION INTRAMUSCULAR; INTRAVENOUS at 04:03

## 2017-09-22 ASSESSMENT — ENCOUNTER SYMPTOMS
ORTHOPNEA: 0
LOSS OF CONSCIOUSNESS: 0
EYE REDNESS: 0
BACK PAIN: 0
DIARRHEA: 0
MEMORY LOSS: 0
EYE DISCHARGE: 0
FOCAL WEAKNESS: 0
HEMOPTYSIS: 0
DIZZINESS: 0
FEVER: 0
PALPITATIONS: 0
EYE PAIN: 0
NERVOUS/ANXIOUS: 0
SPEECH CHANGE: 0
SEIZURES: 0
FALLS: 0
HALLUCINATIONS: 0
SHORTNESS OF BREATH: 0
SPUTUM PRODUCTION: 0
WEAKNESS: 0
BLURRED VISION: 0
NECK PAIN: 0
STRIDOR: 0
NAUSEA: 0
COUGH: 0
FLANK PAIN: 0
HEADACHES: 0
VOMITING: 0
BLOOD IN STOOL: 0

## 2017-09-22 ASSESSMENT — PAIN SCALES - GENERAL
PAINLEVEL_OUTOF10: 0

## 2017-09-22 ASSESSMENT — LIFESTYLE VARIABLES: SUBSTANCE_ABUSE: 0

## 2017-09-22 NOTE — CARE PLAN
Problem: Safety  Goal: Will remain free from injury  Outcome: PROGRESSING AS EXPECTED      Problem: Venous Thromboembolism (VTW)/Deep Vein Thrombosis (DVT) Prevention:  Goal: Patient will participate in Venous Thrombosis (VTE)/Deep Vein Thrombosis (DVT)Prevention Measures  Outcome: PROGRESSING AS EXPECTED

## 2017-09-22 NOTE — CARE PLAN
Problem: Safety  Goal: Will remain free from injury    Intervention: Provide assistance with mobility  Call light within reach, treaded socks in place, bed in lowest position and locked.  Hourly rounding in progress.       Problem: Respiratory:  Goal: Respiratory status will improve    Intervention: Assess and monitor pulmonary status  Assessment complete.  No s/sx of respiratory distress.

## 2017-09-23 PROBLEM — D72.829 LEUKOCYTOSIS: Status: RESOLVED | Noted: 2017-09-20 | Resolved: 2017-09-23

## 2017-09-23 LAB
ANION GAP SERPL CALC-SCNC: 5 MMOL/L (ref 0–11.9)
BASOPHILS # BLD AUTO: 0.6 % (ref 0–1.8)
BASOPHILS # BLD: 0.03 K/UL (ref 0–0.12)
BUN SERPL-MCNC: 11 MG/DL (ref 8–22)
CALCIUM SERPL-MCNC: 7.9 MG/DL (ref 8.5–10.5)
CHLORIDE SERPL-SCNC: 116 MMOL/L (ref 96–112)
CO2 SERPL-SCNC: 21 MMOL/L (ref 20–33)
CREAT SERPL-MCNC: 0.37 MG/DL (ref 0.5–1.4)
EOSINOPHIL # BLD AUTO: 0.12 K/UL (ref 0–0.51)
EOSINOPHIL NFR BLD: 2.3 % (ref 0–6.9)
ERYTHROCYTE [DISTWIDTH] IN BLOOD BY AUTOMATED COUNT: 42.4 FL (ref 35.9–50)
GFR SERPL CREATININE-BSD FRML MDRD: >60 ML/MIN/1.73 M 2
GLUCOSE BLD-MCNC: 110 MG/DL (ref 65–99)
GLUCOSE BLD-MCNC: 136 MG/DL (ref 65–99)
GLUCOSE BLD-MCNC: 244 MG/DL (ref 65–99)
GLUCOSE BLD-MCNC: 68 MG/DL (ref 65–99)
GLUCOSE SERPL-MCNC: 73 MG/DL (ref 65–99)
HCT VFR BLD AUTO: 32.1 % (ref 37–47)
HGB BLD-MCNC: 11.3 G/DL (ref 12–16)
IMM GRANULOCYTES # BLD AUTO: 0.03 K/UL (ref 0–0.11)
IMM GRANULOCYTES NFR BLD AUTO: 0.6 % (ref 0–0.9)
LYMPHOCYTES # BLD AUTO: 2.76 K/UL (ref 1–4.8)
LYMPHOCYTES NFR BLD: 53.9 % (ref 22–41)
MAGNESIUM SERPL-MCNC: 1.7 MG/DL (ref 1.5–2.5)
MCH RBC QN AUTO: 29.7 PG (ref 27–33)
MCHC RBC AUTO-ENTMCNC: 35.2 G/DL (ref 33.6–35)
MCV RBC AUTO: 84.5 FL (ref 81.4–97.8)
MONOCYTES # BLD AUTO: 0.43 K/UL (ref 0–0.85)
MONOCYTES NFR BLD AUTO: 8.4 % (ref 0–13.4)
NEUTROPHILS # BLD AUTO: 1.75 K/UL (ref 2–7.15)
NEUTROPHILS NFR BLD: 34.2 % (ref 44–72)
NRBC # BLD AUTO: 0 K/UL
NRBC BLD AUTO-RTO: 0 /100 WBC
PHOSPHATE SERPL-MCNC: 2.4 MG/DL (ref 2.5–4.5)
PLATELET # BLD AUTO: 147 K/UL (ref 164–446)
PMV BLD AUTO: 9.5 FL (ref 9–12.9)
POTASSIUM SERPL-SCNC: 3 MMOL/L (ref 3.6–5.5)
RBC # BLD AUTO: 3.8 M/UL (ref 4.2–5.4)
SODIUM SERPL-SCNC: 142 MMOL/L (ref 135–145)
WBC # BLD AUTO: 5.1 K/UL (ref 4.8–10.8)

## 2017-09-23 PROCEDURE — 82962 GLUCOSE BLOOD TEST: CPT | Mod: 91

## 2017-09-23 PROCEDURE — A9270 NON-COVERED ITEM OR SERVICE: HCPCS | Performed by: HOSPITALIST

## 2017-09-23 PROCEDURE — 700111 HCHG RX REV CODE 636 W/ 250 OVERRIDE (IP): Performed by: HOSPITALIST

## 2017-09-23 PROCEDURE — 700101 HCHG RX REV CODE 250: Performed by: HOSPITALIST

## 2017-09-23 PROCEDURE — 84100 ASSAY OF PHOSPHORUS: CPT

## 2017-09-23 PROCEDURE — 85025 COMPLETE CBC W/AUTO DIFF WBC: CPT

## 2017-09-23 PROCEDURE — 700102 HCHG RX REV CODE 250 W/ 637 OVERRIDE(OP): Performed by: HOSPITALIST

## 2017-09-23 PROCEDURE — 80048 BASIC METABOLIC PNL TOTAL CA: CPT

## 2017-09-23 PROCEDURE — 700105 HCHG RX REV CODE 258: Performed by: HOSPITALIST

## 2017-09-23 PROCEDURE — 99232 SBSQ HOSP IP/OBS MODERATE 35: CPT | Performed by: HOSPITALIST

## 2017-09-23 PROCEDURE — 770022 HCHG ROOM/CARE - ICU (200)

## 2017-09-23 PROCEDURE — 83735 ASSAY OF MAGNESIUM: CPT

## 2017-09-23 RX ORDER — POTASSIUM CHLORIDE 20 MEQ/1
40 TABLET, EXTENDED RELEASE ORAL ONCE
Status: COMPLETED | OUTPATIENT
Start: 2017-09-23 | End: 2017-09-23

## 2017-09-23 RX ORDER — METOCLOPRAMIDE 10 MG/1
10 TABLET ORAL
Status: DISCONTINUED | OUTPATIENT
Start: 2017-09-23 | End: 2017-09-24 | Stop reason: HOSPADM

## 2017-09-23 RX ORDER — INSULIN GLARGINE 100 [IU]/ML
30 INJECTION, SOLUTION SUBCUTANEOUS
Status: DISCONTINUED | OUTPATIENT
Start: 2017-09-24 | End: 2017-09-24 | Stop reason: HOSPADM

## 2017-09-23 RX ORDER — INSULIN GLARGINE 100 [IU]/ML
20 INJECTION, SOLUTION SUBCUTANEOUS EVERY EVENING
Status: DISCONTINUED | OUTPATIENT
Start: 2017-09-23 | End: 2017-09-24 | Stop reason: HOSPADM

## 2017-09-23 RX ORDER — INSULIN GLARGINE 100 [IU]/ML
20 INJECTION, SOLUTION SUBCUTANEOUS ONCE
Status: COMPLETED | OUTPATIENT
Start: 2017-09-23 | End: 2017-09-23

## 2017-09-23 RX ORDER — MAGNESIUM SULFATE HEPTAHYDRATE 40 MG/ML
2 INJECTION, SOLUTION INTRAVENOUS ONCE
Status: COMPLETED | OUTPATIENT
Start: 2017-09-23 | End: 2017-09-23

## 2017-09-23 RX ADMIN — SODIUM CHLORIDE: 9 INJECTION, SOLUTION INTRAVENOUS at 19:47

## 2017-09-23 RX ADMIN — INSULIN LISPRO 3 UNITS: 100 INJECTION, SOLUTION INTRAVENOUS; SUBCUTANEOUS at 17:45

## 2017-09-23 RX ADMIN — ACETAMINOPHEN 650 MG: 325 TABLET, FILM COATED ORAL at 16:03

## 2017-09-23 RX ADMIN — INSULIN LISPRO 3 UNITS: 100 INJECTION, SOLUTION INTRAVENOUS; SUBCUTANEOUS at 11:14

## 2017-09-23 RX ADMIN — INSULIN GLARGINE 20 UNITS: 100 INJECTION, SOLUTION SUBCUTANEOUS at 11:13

## 2017-09-23 RX ADMIN — ONDANSETRON 4 MG: 2 INJECTION INTRAMUSCULAR; INTRAVENOUS at 04:38

## 2017-09-23 RX ADMIN — INSULIN LISPRO 3 UNITS: 100 INJECTION, SOLUTION INTRAVENOUS; SUBCUTANEOUS at 11:15

## 2017-09-23 RX ADMIN — ENOXAPARIN SODIUM 40 MG: 100 INJECTION SUBCUTANEOUS at 08:47

## 2017-09-23 RX ADMIN — POTASSIUM PHOSPHATE, MONOBASIC AND POTASSIUM PHOSPHATE, DIBASIC 30 MMOL: 224; 236 INJECTION, SOLUTION INTRAVENOUS at 09:49

## 2017-09-23 RX ADMIN — STANDARDIZED SENNA CONCENTRATE AND DOCUSATE SODIUM 2 TABLET: 8.6; 5 TABLET, FILM COATED ORAL at 20:16

## 2017-09-23 RX ADMIN — ATORVASTATIN CALCIUM 20 MG: 20 TABLET, FILM COATED ORAL at 20:16

## 2017-09-23 RX ADMIN — INSULIN LISPRO 2 UNITS: 100 INJECTION, SOLUTION INTRAVENOUS; SUBCUTANEOUS at 17:46

## 2017-09-23 RX ADMIN — MAGNESIUM SULFATE IN WATER 2 G: 40 INJECTION, SOLUTION INTRAVENOUS at 09:24

## 2017-09-23 RX ADMIN — SODIUM CHLORIDE: 9 INJECTION, SOLUTION INTRAVENOUS at 20:10

## 2017-09-23 RX ADMIN — POTASSIUM CHLORIDE 40 MEQ: 1500 TABLET, EXTENDED RELEASE ORAL at 16:03

## 2017-09-23 RX ADMIN — INSULIN GLARGINE 20 UNITS: 100 INJECTION, SOLUTION SUBCUTANEOUS at 20:21

## 2017-09-23 ASSESSMENT — ENCOUNTER SYMPTOMS
EYE DISCHARGE: 0
SPEECH CHANGE: 0
HEMOPTYSIS: 0
WEAKNESS: 0
PALPITATIONS: 0
SEIZURES: 0
EYE PAIN: 0
FEVER: 0
LOSS OF CONSCIOUSNESS: 0
ORTHOPNEA: 0
SPUTUM PRODUCTION: 0
BACK PAIN: 0
FALLS: 0
NECK PAIN: 0
MEMORY LOSS: 0
NERVOUS/ANXIOUS: 0
FLANK PAIN: 0
EYE REDNESS: 0
FOCAL WEAKNESS: 0
HALLUCINATIONS: 0
NAUSEA: 1
BLURRED VISION: 0
BLOOD IN STOOL: 0
DIARRHEA: 0
COUGH: 0
VOMITING: 1

## 2017-09-23 ASSESSMENT — PAIN SCALES - GENERAL
PAINLEVEL_OUTOF10: 0

## 2017-09-23 ASSESSMENT — LIFESTYLE VARIABLES: SUBSTANCE_ABUSE: 0

## 2017-09-23 NOTE — PROGRESS NOTES
Please refer to Osmin TERRAZAS RN for prior interval hx throughout night    0715 FSBG re-check (third time) and at 110 at this time.

## 2017-09-23 NOTE — PROGRESS NOTES
[facility]    Date of Service: 2017    Chief Complaint  27 y.o. female admitted 2017 with DKA  Interval Problem Update  Hypoglycemia this morning with an episode of emesis  Remains open cylinder    Consultants/Specialty  none    Disposition  TBD        Review of Systems   Constitutional: Positive for malaise/fatigue. Negative for fever.   HENT: Negative.    Eyes: Negative for blurred vision, pain, discharge and redness.   Respiratory: Negative for cough, hemoptysis and sputum production.    Cardiovascular: Negative for chest pain, palpitations, orthopnea and leg swelling.   Gastrointestinal: Positive for nausea and vomiting. Negative for blood in stool, diarrhea and melena.   Genitourinary: Negative for dysuria, flank pain and hematuria.   Musculoskeletal: Negative for back pain, falls, joint pain and neck pain.   Skin: Negative.    Neurological: Negative for speech change, focal weakness, seizures, loss of consciousness and weakness.   Psychiatric/Behavioral: Negative for hallucinations, memory loss and substance abuse. The patient is not nervous/anxious.    All other systems reviewed and are negative.     Physical Exam  Laboratory/Imaging   Hemodynamics  Temp (24hrs), Av.8 °C (98.3 °F), Min:36.3 °C (97.3 °F), Max:37.1 °C (98.8 °F)   Temperature: 37.1 °C (98.7 °F)  Pulse  Av.7  Min: 72  Max: 146 Heart Rate (Monitored): 98  NIBP: 103/75      Respiratory      Respiration: (!) 21, Pulse Oximetry: 93 %        RUL Breath Sounds: Clear, RML Breath Sounds: Clear, RLL Breath Sounds: Clear, NANCY Breath Sounds: Clear, LLL Breath Sounds: Clear    Fluids    Intake/Output Summary (Last 24 hours) at 17 1346  Last data filed at 17 1200   Gross per 24 hour   Intake             4589 ml   Output             2500 ml   Net             2089 ml       Nutrition  Orders Placed This Encounter   Procedures   • DIET ORDER     Standing Status:   Standing     Number of Occurrences:   1      Order Specific Question:   Diet:     Answer:   Diabetic [3]     Physical Exam   Constitutional: She is oriented to person, place, and time. She appears well-developed and well-nourished.   HENT:   Head: Normocephalic and atraumatic.   Right Ear: External ear normal.   Left Ear: External ear normal.   Mouth/Throat: No oropharyngeal exudate.   Eyes: Right eye exhibits no discharge. Left eye exhibits no discharge. No scleral icterus.   Neck: Neck supple. No JVD present. No tracheal deviation present.   Cardiovascular: Normal rate and regular rhythm.  Exam reveals no friction rub.    No murmur heard.  Pulmonary/Chest: Effort normal and breath sounds normal. No respiratory distress. She has no wheezes. She exhibits no tenderness.   Abdominal: Soft. Bowel sounds are normal. She exhibits no distension. There is no tenderness. There is no rebound.   Musculoskeletal: She exhibits no edema or tenderness.   Lymphadenopathy:     She has no cervical adenopathy.   Neurological: She is oriented to person, place, and time. No cranial nerve deficit. She exhibits normal muscle tone.   Skin: Skin is warm and dry. She is not diaphoretic. No cyanosis. Nails show no clubbing.   Psychiatric: She has a normal mood and affect. Her behavior is normal.   Nursing note and vitals reviewed.      Recent Labs      09/21/17   0200  09/23/17   0435   WBC  13.5*  5.1   RBC  4.31  3.80*   HEMOGLOBIN  12.7  11.3*   HEMATOCRIT  36.5*  32.1*   MCV  84.7  84.5   MCH  29.0  29.7   MCHC  34.2  35.2*   RDW  41.6  42.4   PLATELETCT  261  147*   MPV  9.6  9.5     Recent Labs      09/21/17   1753  09/22/17   0530  09/23/17   0435   SODIUM  143  143  142   POTASSIUM  3.4*  3.7  3.0*   CHLORIDE  122*  120*  116*   CO2  18*  15*  21   GLUCOSE  154*  254*  73   BUN  12  10  11   CREATININE  0.42*  0.45*  0.37*   CALCIUM  7.5*  7.7*  7.9*                      Assessment/Plan     * DKA (diabetic ketoacidoses) (CMS-[facility])- (present on admission)   Assessment & Plan     No clear precipitating factors? Secondary to noncompliance  DKA resolved           Gastroparesis due to DM (CMS-[facility])- (present on admission)   Assessment & Plan    Resume Reglan        Hypophosphatemia- (present on admission)   Assessment & Plan    We'll give IV K Phos and  monitor        DM (diabetes mellitus) type 1, uncontrolled, with ketoacidosis (CMS-HCC)- (present on admission)   Assessment & Plan    Reinforced compliance and need for close outpt f/u     Continue Lantus but will decrease her evening dose given a.m. hypoglycemia and continue pre-meal Humalog and insulin sliding scale monitor blood sugars and adjust accordingly            Reviewed items::  Labs reviewed and Medications reviewed  Pitt catheter::  No Pitt  Central line in place:  Need for access  DVT prophylaxis pharmacological::  Enoxaparin (Lovenox)      Continue monitoring given her labile blood sugars plan to discharge home in a.m. if stable

## 2017-09-23 NOTE — PROGRESS NOTES
0620: Blood sugar check revealed FSBS of 63. Upon recheck, FSBS 65. Per MAR, to administer 25mL D50 and call MD.    0623:Dr. Campuzano paged regarding blood sugar values. Per MD, encourage oral glucose intake, and if unable to tolerate (as pt was nauseous intermittently throughout NOC), give D50 as ordered.     0640: Pt consumed 4 oz fruit juice (not nauseous and able to tolerate at moment), to recheck FSBS in 15 mins per MAR.     0655: 15 minute interval FSBS 68. CAIT Vega (day shift RN) provided with report. Juice given to pt at this time. To recheck sugar in 15 minutes and proceed per MAR prn hypoglycemia.

## 2017-09-23 NOTE — PROGRESS NOTES
[facility]ist Progress Note    Date of Service: 2017    Chief Complaint  27 y.o. female admitted 2017 with DKA  Interval Problem Update  Taken off insulin drip, feels better, no further vomiting    Consultants/Specialty  none    Disposition  TBD        Review of Systems   Constitutional: Positive for malaise/fatigue. Negative for fever.   HENT: Negative for congestion, ear discharge and nosebleeds.    Eyes: Negative for blurred vision, pain, discharge and redness.   Respiratory: Negative for cough, hemoptysis, sputum production, shortness of breath and stridor.    Cardiovascular: Negative for chest pain, palpitations, orthopnea and leg swelling.   Gastrointestinal: Negative for blood in stool, diarrhea, melena, nausea and vomiting.   Genitourinary: Negative for dysuria, flank pain and hematuria.   Musculoskeletal: Negative for back pain, falls, joint pain and neck pain.   Skin: Negative.    Neurological: Negative for dizziness, speech change, focal weakness, seizures, loss of consciousness, weakness and headaches.   Psychiatric/Behavioral: Negative for hallucinations, memory loss and substance abuse. The patient is not nervous/anxious.    All other systems reviewed and are negative.     Physical Exam  Laboratory/Imaging   Hemodynamics  Temp (24hrs), Av.8 °C (98.2 °F), Min:36.3 °C (97.3 °F), Max:37.1 °C (98.8 °F)   Temperature: 36.9 °C (98.4 °F)  Pulse  Av.9  Min: 86  Max: 146 Heart Rate (Monitored): 96  NIBP: 118/87      Respiratory      Respiration: 16, Pulse Oximetry: 98 %     Work Of Breathing / Effort: Mild  RUL Breath Sounds: Diminished, RML Breath Sounds: Diminished, RLL Breath Sounds: Diminished, NANCY Breath Sounds: Diminished, LLL Breath Sounds: Diminished    Fluids    Intake/Output Summary (Last 24 hours) at 17 1730  Last data filed at 17 1700   Gross per 24 hour   Intake           1823.4 ml   Output             1150 ml   Net            673.4 ml       Nutrition  Orders  Placed This Encounter   Procedures   • DIET ORDER     Standing Status:   Standing     Number of Occurrences:   1     Order Specific Question:   Diet:     Answer:   Diabetic [3]     Physical Exam   Constitutional: She is oriented to person, place, and time. She appears well-developed and well-nourished.   HENT:   Head: Normocephalic and atraumatic.   Mouth/Throat: No oropharyngeal exudate.   Eyes: Right eye exhibits no discharge. Left eye exhibits no discharge. No scleral icterus.   Neck: Neck supple. No JVD present. No tracheal deviation present.   Cardiovascular: Normal rate and regular rhythm.  Exam reveals no friction rub.    No murmur heard.  Pulmonary/Chest: Effort normal and breath sounds normal. No respiratory distress. She has no wheezes. She exhibits no tenderness.   Abdominal: Soft. Bowel sounds are normal. She exhibits no distension. There is no tenderness. There is no rebound.   Musculoskeletal: She exhibits no edema or tenderness.   Lymphadenopathy:     She has no cervical adenopathy.   Neurological: She is oriented to person, place, and time. No cranial nerve deficit. She exhibits normal muscle tone.   Skin: Skin is warm and dry. She is not diaphoretic. No cyanosis. Nails show no clubbing.   Psychiatric: She has a normal mood and affect.   Nursing note and vitals reviewed.      Recent Labs      09/20/17   1015  09/21/17   0200   WBC  12.0*  13.5*   RBC  5.40  4.31   HEMOGLOBIN  15.9  12.7   HEMATOCRIT  47.6*  36.5*   MCV  88.1  84.7   MCH  29.4  29.0   MCHC  33.4*  34.2   RDW  43.3  41.6   PLATELETCT  271  261   MPV  10.4  9.6     Recent Labs      09/21/17   1305  09/21/17   1753  09/22/17   0530   SODIUM  144  143  143   POTASSIUM  3.2*  3.4*  3.7   CHLORIDE  123*  122*  120*   CO2  17*  18*  15*   GLUCOSE  146*  154*  254*   BUN  14  12  10   CREATININE  0.52  0.42*  0.45*   CALCIUM  7.6*  7.5*  7.7*                      Assessment/Plan     * DKA (diabetic ketoacidoses) (CMS-[facility])- (present on  admission)   Assessment & Plan    No clear precipitating factors? Secondary to noncompliance  DKA resolved transition to Lantus and sliding scale          Hypophosphatemia- (present on admission)   Assessment & Plan    Replete and monitor        DM (diabetes mellitus) type 1, uncontrolled, with ketoacidosis (CMS-HCC)- (present on admission)   Assessment & Plan    Reinforced compliance and need for close outpt f/u    Will add pre-meal Humalog continue Lantus and insulin sliding scale monitor blood sugars and adjust accordingly        Leukocytosis- (present on admission)   Assessment & Plan      likely reactive, no clinical signs of infection, monitor cbc            Reviewed items::  Labs reviewed and Medications reviewed  Pitt catheter::  No Pitt  Central line in place:  Need for access  DVT prophylaxis pharmacological::  Enoxaparin (Lovenox)

## 2017-09-23 NOTE — CARE PLAN
Problem: Bowel/Gastric:  Goal: Normal bowel function is maintained or improved  Outcome: PROGRESSING AS EXPECTED  Pt blood glucose being monitored closely and tolerance to changes in insulin regimen. Pt tolerating PO intake well    Problem: Knowledge Deficit  Goal: Knowledge of disease process/condition, treatment plan, diagnostic tests, and medications will improve  Pt updated on POC and reasoning behind therapies and interventions. Pt onboard and understands and encouraged to ask questions

## 2017-09-24 ENCOUNTER — PATIENT OUTREACH (OUTPATIENT)
Dept: HEALTH INFORMATION MANAGEMENT | Facility: OTHER | Age: 27
End: 2017-09-24

## 2017-09-24 VITALS
HEART RATE: 70 BPM | WEIGHT: 170.86 LBS | TEMPERATURE: 98.1 F | BODY MASS INDEX: 28.47 KG/M2 | SYSTOLIC BLOOD PRESSURE: 125 MMHG | DIASTOLIC BLOOD PRESSURE: 74 MMHG | OXYGEN SATURATION: 98 % | RESPIRATION RATE: 26 BRPM | HEIGHT: 65 IN

## 2017-09-24 PROBLEM — E11.10 DKA (DIABETIC KETOACIDOSES): Status: RESOLVED | Noted: 2017-09-20 | Resolved: 2017-09-24

## 2017-09-24 LAB
ANION GAP SERPL CALC-SCNC: 5 MMOL/L (ref 0–11.9)
BUN SERPL-MCNC: 9 MG/DL (ref 8–22)
CALCIUM SERPL-MCNC: 7.9 MG/DL (ref 8.5–10.5)
CHLORIDE SERPL-SCNC: 109 MMOL/L (ref 96–112)
CO2 SERPL-SCNC: 27 MMOL/L (ref 20–33)
CREAT SERPL-MCNC: 0.31 MG/DL (ref 0.5–1.4)
GFR SERPL CREATININE-BSD FRML MDRD: >60 ML/MIN/1.73 M 2
GLUCOSE BLD-MCNC: 107 MG/DL (ref 65–99)
GLUCOSE BLD-MCNC: 115 MG/DL (ref 65–99)
GLUCOSE BLD-MCNC: 165 MG/DL (ref 65–99)
GLUCOSE BLD-MCNC: 193 MG/DL (ref 65–99)
GLUCOSE BLD-MCNC: 214 MG/DL (ref 65–99)
GLUCOSE SERPL-MCNC: 82 MG/DL (ref 65–99)
POTASSIUM SERPL-SCNC: 3.6 MMOL/L (ref 3.6–5.5)
SODIUM SERPL-SCNC: 141 MMOL/L (ref 135–145)

## 2017-09-24 PROCEDURE — 82962 GLUCOSE BLOOD TEST: CPT | Mod: 91

## 2017-09-24 PROCEDURE — 700102 HCHG RX REV CODE 250 W/ 637 OVERRIDE(OP): Performed by: HOSPITALIST

## 2017-09-24 PROCEDURE — A9270 NON-COVERED ITEM OR SERVICE: HCPCS | Performed by: HOSPITALIST

## 2017-09-24 PROCEDURE — 700105 HCHG RX REV CODE 258: Performed by: HOSPITALIST

## 2017-09-24 PROCEDURE — 700111 HCHG RX REV CODE 636 W/ 250 OVERRIDE (IP): Performed by: HOSPITALIST

## 2017-09-24 PROCEDURE — 80048 BASIC METABOLIC PNL TOTAL CA: CPT

## 2017-09-24 PROCEDURE — 99239 HOSP IP/OBS DSCHRG MGMT >30: CPT | Performed by: HOSPITALIST

## 2017-09-24 RX ORDER — INSULIN GLARGINE 100 [IU]/ML
25 INJECTION, SOLUTION SUBCUTANEOUS ONCE
Status: COMPLETED | OUTPATIENT
Start: 2017-09-24 | End: 2017-09-24

## 2017-09-24 RX ORDER — POTASSIUM CHLORIDE 20 MEQ/1
40 TABLET, EXTENDED RELEASE ORAL ONCE
Status: COMPLETED | OUTPATIENT
Start: 2017-09-24 | End: 2017-09-24

## 2017-09-24 RX ADMIN — SODIUM CHLORIDE: 9 INJECTION, SOLUTION INTRAVENOUS at 06:15

## 2017-09-24 RX ADMIN — INSULIN LISPRO 3 UNITS: 100 INJECTION, SOLUTION INTRAVENOUS; SUBCUTANEOUS at 11:38

## 2017-09-24 RX ADMIN — INSULIN GLARGINE 25 UNITS: 100 INJECTION, SOLUTION SUBCUTANEOUS at 10:02

## 2017-09-24 RX ADMIN — INSULIN LISPRO 3 UNITS: 100 INJECTION, SOLUTION INTRAVENOUS; SUBCUTANEOUS at 11:40

## 2017-09-24 RX ADMIN — POTASSIUM CHLORIDE 40 MEQ: 1500 TABLET, EXTENDED RELEASE ORAL at 08:43

## 2017-09-24 RX ADMIN — ENOXAPARIN SODIUM 40 MG: 100 INJECTION SUBCUTANEOUS at 08:43

## 2017-09-24 ASSESSMENT — PAIN SCALES - GENERAL
PAINLEVEL_OUTOF10: 0

## 2017-09-24 ASSESSMENT — LIFESTYLE VARIABLES: EVER_SMOKED: NEVER

## 2017-09-24 NOTE — CARE PLAN
Problem: Safety  Goal: Will remain free from injury    Intervention: Provide assistance with mobility  1-person RN assistance provided with all mobilization. Pt educated on safety measures (e.g. Use of bed alarm, ICU monitoring cords, alerting nurse when needs to use bathroom). Pt compliant with these measures. Bed alarm on, bed in low position.       Problem: Infection  Goal: Will remain free from infection    Intervention: Assess signs and symptoms of infection  Lab trends assessed for s/s infection as well as VS and outward signs at lines sites/sites of breakdown.

## 2017-09-24 NOTE — DISCHARGE SUMMARY
DATE OF ADMISSION:  09/20/2017    DATE OF DISCHARGE:  09/24/2017    PRINCIPAL DIAGNOSES:  1.  Type 1 diabetes, uncontrolled.  2.  Diabetic ketoacidosis, likely secondary to poor compliance.  3.  Hyperphosphatemia.  4.  Gastroparesis.    PRESENTATION:  Please refer to the dictated H and P.    PERTINENT TEST RESULTS ON THIS HOSPITALIZATION:  White blood cell count 5.1,   hemoglobin 11.3, hematocrit 32.1, platelet count 142.  Sodium is 141,   potassium 3.6, chloride 109, bicarbonate 27, glucose 82, BUN 9, creatinine   0.31, calcium 7.9.  Chest x-ray revealed placement of left IJ central line   without evidence of complication.    PROCEDURES DONE DURING THIS HOSPITALIZATION:  Central line placement.    HOSPITAL COURSE:  The patient is a 27-year-old female with history of diabetes   and recurrent hospitalizations for DKA.  She presented with DKA.  She was   admitted to the intensive care unit.  She was started on insulin drip, IV   fluids, and her electrolytes were repleted.  She was also started on   antiemetics.  She improved and her DKA resolved.  She was transitioned to   subcutaneous insulin.  She did not have any clear precipitating factors.    During this hospitalization, her blood sugars have been on the low side and we   had to decrease her insulin from her home regimen, so I suspect there is some   element of noncompliance.  I had a long discussion with the patient about the   importance to comply with diet and lifestyle changes and close outpatient   followup.  She is otherwise clinically stable for discharge.  She is   tolerating her diet.  She will be discharged with the following instructions.    DIET:  Diabetic.    ACTIVITY:  As tolerated.    DISCHARGE MEDICATIONS:  The patient will resume her home medications as   follows:  Atorvastatin 20 mg daily, NovoLog sliding scale before meals, Lantus   30 units b.i.d., Reglan 10 mg 1 time daily as needed.    The patient will follow up with her primary care  provider at the [facility].    I also have a  to try set an appointment for her; however, they   told me they could not do that on weekends and the patient understands the   importance to call tomorrow and schedule followup appointment.    Total time spent preparing for this discharge was 35 minutes.       ____________________________________     MD ANGI HUFFMAN / UDAY    DD:  09/24/2017 11:22:39  DT:  09/24/2017 13:15:00    D#:  [id number]  Job#:  [id number]    cc: [facility]

## 2017-09-24 NOTE — CARE PLAN
Problem: Bowel/Gastric:  Goal: Will not experience complications related to bowel motility  Outcome: PROGRESSING AS EXPECTED  Pt encouraged to take bowel protocol medication to help with bowel motility.     Problem: Knowledge Deficit  Goal: Knowledge of disease process/condition, treatment plan, diagnostic tests, and medications will improve  Outcome: PROGRESSING AS EXPECTED  Updated pt on POC and reasoning behind therapies and behind holding medications due to disease process

## 2017-09-24 NOTE — PROGRESS NOTES
Placed page to Dr. Macdonald regarding transfer orders for this pt. Per NOC shift report, keeping pt for monitoring d/t hypoglycemic episode at start of day shift and possible discharge from ICU tomorrow provided pt is stable; this is reflected in Dr. Jackelin Urbina's note this AM as well. Transfer orders were placed prior (9/22 AM) to hypoglycemia this AM. Per Dr. Macdonald, preferred to keep pt in ICU overnight and reassess transfer/discharge status in AM. Charge RN notified. Pt stable at this time.

## 2017-09-24 NOTE — PROGRESS NOTES
Paged Dr. Campuzano regarding FSBS 107 at 0630. Holding sliding scale insulin per MAR as FSBS within parameters, but inquiring as to whether to give the additional insulin on MAR, bearing in mind recent hypoglycemic episode. Per Dr. Campuzano, hold all 0700 scheduled insulin (lantus and humalog). To readdress coverage regimen in rounds per MD.    Also informed of void x1 at 2000 (800 cc) without urge to void rest of night. No new orders received at this time; no bladder scan needed per MD.

## 2017-09-24 NOTE — DISCHARGE INSTRUCTIONS
Discharge Instructions    Discharged to home by car with relative. Discharged via walking, hospital escort: Refused.  Special equipment needed: Not Applicable    Be sure to schedule a follow-up appointment with your primary care doctor or any specialists as instructed.     Discharge Plan:   Diet Plan: Discussed  Activity Level: Discussed  Confirmed Follow up Appointment: Appointment Scheduled  Confirmed Symptoms Management: Discussed  Medication Reconciliation Updated: Yes  Influenza Vaccine Indication: Indicated: 9 to 64 years of age    I understand that a diet low in cholesterol, fat, and sodium is recommended for good health. Unless I have been given specific instructions below for another diet, I accept this instruction as my diet prescription.   Other diet: diabetic    Special Instructions: None    · Is patient discharged on Warfarin / Coumadin?   No     · Is patient Post Blood Transfusion?  No    Depression / Suicide Risk    As you are discharged from this [facility] facility, it is important to learn how to keep safe from harming yourself.    Recognize the warning signs:  · Abrupt changes in personality, positive or negative- including increase in energy   · Giving away possessions  · Change in eating patterns- significant weight changes-  positive or negative  · Change in sleeping patterns- unable to sleep or sleeping all the time   · Unwillingness or inability to communicate  · Depression  · Unusual sadness, discouragement and loneliness  · Talk of wanting to die  · Neglect of personal appearance   · Rebelliousness- reckless behavior  · Withdrawal from people/activities they love  · Confusion- inability to concentrate     If you or a loved one observes any of these behaviors or has concerns about self-harm, here's what you can do:  · Talk about it- your feelings and reasons for harming yourself  · Remove any means that you might use to hurt yourself (examples: pills, rope, extension cords,  firearm)  · Get professional help from the community (Mental Health, Substance Abuse, psychological counseling)  · Do not be alone:Call your Safe Contact- someone whom you trust who will be there for you.  · Call your local CRISIS HOTLINE 452-7969 or 401-502-9156  · Call your local Children's Mobile Crisis Response Team [place] (194) 219-8324 or www.[employer]  · Call the toll free National Suicide Prevention Hotlines   · National Suicide Prevention Lifeline 959-508-JNBM (1821)  · [employer] Line Network 800-SUICIDE (423-4879)    Physician Discharge Summary     Patient ID:  Alis Sparks  [id number]  27 y.o.  1990    Admit date: 9/20/2017    Discharge date and time: 9/24/17 1500     Admitting Physician: Tamiko Douglass M.D.     Discharge Physician: Pa Urbina    Admission Diagnoses: DKA (diabetic ketoacidoses) (CMS-[facility])  Metabolic acidosis  Leukocytosis  Hyponatremia  High anion gap metabolic acidosis  DKA (diabetic ketoacidoses) (CMS-[facility])  Metabolic acidosis  Leukocytosis  Hyponatremia  High anion gap metabolic acidosis    Discharge Diagnoses: DKA    Admission Condition: critical    Discharged Condition: good    Indication for Admission: N/V    Hospital Course: 4 days    Consults: none    Significant Diagnostic Studies: labs: CMP, BMP    Treatments: insulin: Humalog, regular and Lantus    Discharge Exam: non-remarkable       Disposition: home    Patient Instructions:     Activity: activity as tolerated  Diet: diabetic diet  Wound Care: none needed    Follow-up with [facility] in 1 weeks.    Signed:  Silvia Munoz  9/24/2017  3:02 PM

## 2017-09-25 LAB — GLUCOSE BLD-MCNC: 65 MG/DL (ref 65–99)

## 2017-11-07 ENCOUNTER — HOSPITAL ENCOUNTER (INPATIENT)
Facility: MEDICAL CENTER | Age: 27
LOS: 3 days | DRG: 137 | End: 2017-11-10
Attending: EMERGENCY MEDICINE | Admitting: INTERNAL MEDICINE
Payer: MEDICAID

## 2017-11-07 ENCOUNTER — APPOINTMENT (OUTPATIENT)
Dept: RADIOLOGY | Facility: MEDICAL CENTER | Age: 27
DRG: 137 | End: 2017-11-07
Attending: INTERNAL MEDICINE
Payer: MEDICAID

## 2017-11-07 ENCOUNTER — RESOLUTE PROFESSIONAL BILLING HOSPITAL PROF FEE (OUTPATIENT)
Dept: HOSPITALIST | Facility: MEDICAL CENTER | Age: 27
End: 2017-11-07
Payer: MEDICAID

## 2017-11-07 DIAGNOSIS — E10.10 DIABETIC KETOACIDOSIS WITHOUT COMA ASSOCIATED WITH TYPE 1 DIABETES MELLITUS (HCC): ICD-10-CM

## 2017-11-07 DIAGNOSIS — M27.2 MANDIBULAR ABSCESS: ICD-10-CM

## 2017-11-07 DIAGNOSIS — J02.8 PHARYNGITIS DUE TO OTHER ORGANISM: ICD-10-CM

## 2017-11-07 DIAGNOSIS — R11.0 NAUSEA: ICD-10-CM

## 2017-11-07 PROBLEM — E11.10 DKA (DIABETIC KETOACIDOSES): Status: ACTIVE | Noted: 2017-11-07

## 2017-11-07 PROBLEM — E10.65 HYPERGLYCEMIA DUE TO TYPE 1 DIABETES MELLITUS (HCC): Status: ACTIVE | Noted: 2017-11-07

## 2017-11-07 PROBLEM — J02.9 SORE THROAT: Status: ACTIVE | Noted: 2017-11-07

## 2017-11-07 LAB
ALBUMIN SERPL BCP-MCNC: 3.2 G/DL (ref 3.2–4.9)
ALBUMIN SERPL BCP-MCNC: 4.3 G/DL (ref 3.2–4.9)
ALBUMIN/GLOB SERPL: 1.2 G/DL
ALBUMIN/GLOB SERPL: 1.4 G/DL
ALP SERPL-CCNC: 107 U/L (ref 30–99)
ALP SERPL-CCNC: 147 U/L (ref 30–99)
ALT SERPL-CCNC: 10 U/L (ref 2–50)
ALT SERPL-CCNC: 12 U/L (ref 2–50)
ANION GAP SERPL CALC-SCNC: 11 MMOL/L (ref 0–11.9)
ANION GAP SERPL CALC-SCNC: 12 MMOL/L (ref 0–11.9)
ANION GAP SERPL CALC-SCNC: 7 MMOL/L (ref 0–11.9)
APPEARANCE UR: CLEAR
AST SERPL-CCNC: 14 U/L (ref 12–45)
AST SERPL-CCNC: 14 U/L (ref 12–45)
B-OH-BUTYR SERPL-MCNC: 0.42 MMOL/L (ref 0.02–0.27)
BASOPHILS # BLD AUTO: 0.3 % (ref 0–1.8)
BASOPHILS # BLD AUTO: 0.4 % (ref 0–1.8)
BASOPHILS # BLD: 0.03 K/UL (ref 0–0.12)
BASOPHILS # BLD: 0.03 K/UL (ref 0–0.12)
BILIRUB SERPL-MCNC: 0.5 MG/DL (ref 0.1–1.5)
BILIRUB SERPL-MCNC: 0.6 MG/DL (ref 0.1–1.5)
BUN SERPL-MCNC: 14 MG/DL (ref 8–22)
BUN SERPL-MCNC: 15 MG/DL (ref 8–22)
BUN SERPL-MCNC: 15 MG/DL (ref 8–22)
CALCIUM SERPL-MCNC: 8.4 MG/DL (ref 8.5–10.5)
CALCIUM SERPL-MCNC: 8.4 MG/DL (ref 8.5–10.5)
CALCIUM SERPL-MCNC: 9.8 MG/DL (ref 8.5–10.5)
CHLORIDE SERPL-SCNC: 100 MMOL/L (ref 96–112)
CHLORIDE SERPL-SCNC: 104 MMOL/L (ref 96–112)
CHLORIDE SERPL-SCNC: 105 MMOL/L (ref 96–112)
CO2 SERPL-SCNC: 20 MMOL/L (ref 20–33)
CO2 SERPL-SCNC: 24 MMOL/L (ref 20–33)
CO2 SERPL-SCNC: 24 MMOL/L (ref 20–33)
COLOR UR AUTO: YELLOW
COMMENT 1642: NORMAL
CREAT SERPL-MCNC: 0.58 MG/DL (ref 0.5–1.4)
CREAT SERPL-MCNC: 0.61 MG/DL (ref 0.5–1.4)
CREAT SERPL-MCNC: 0.67 MG/DL (ref 0.5–1.4)
DEPRECATED S PYO AG THROAT QL EIA: NORMAL
EKG IMPRESSION: NORMAL
EOSINOPHIL # BLD AUTO: 0.01 K/UL (ref 0–0.51)
EOSINOPHIL # BLD AUTO: 0.02 K/UL (ref 0–0.51)
EOSINOPHIL NFR BLD: 0.1 % (ref 0–6.9)
EOSINOPHIL NFR BLD: 0.2 % (ref 0–6.9)
ERYTHROCYTE [DISTWIDTH] IN BLOOD BY AUTOMATED COUNT: 40.6 FL (ref 35.9–50)
ERYTHROCYTE [DISTWIDTH] IN BLOOD BY AUTOMATED COUNT: 40.9 FL (ref 35.9–50)
GFR SERPL CREATININE-BSD FRML MDRD: >60 ML/MIN/1.73 M 2
GLOBULIN SER CALC-MCNC: 2.6 G/DL (ref 1.9–3.5)
GLOBULIN SER CALC-MCNC: 3.1 G/DL (ref 1.9–3.5)
GLUCOSE BLD-MCNC: 306 MG/DL (ref 65–99)
GLUCOSE BLD-MCNC: 358 MG/DL (ref 65–99)
GLUCOSE BLD-MCNC: 397 MG/DL (ref 65–99)
GLUCOSE SERPL-MCNC: 290 MG/DL (ref 65–99)
GLUCOSE SERPL-MCNC: 292 MG/DL (ref 65–99)
GLUCOSE SERPL-MCNC: 337 MG/DL (ref 65–99)
GLUCOSE UR QL STRIP.AUTO: 500 MG/DL
HCG UR QL: NEGATIVE
HCT VFR BLD AUTO: 35.4 % (ref 37–47)
HCT VFR BLD AUTO: 42.7 % (ref 37–47)
HETEROPH AB SER QL: NEGATIVE
HGB BLD-MCNC: 11.7 G/DL (ref 12–16)
HGB BLD-MCNC: 14.7 G/DL (ref 12–16)
IMM GRANULOCYTES # BLD AUTO: 0.03 K/UL (ref 0–0.11)
IMM GRANULOCYTES # BLD AUTO: 0.04 K/UL (ref 0–0.11)
IMM GRANULOCYTES NFR BLD AUTO: 0.4 % (ref 0–0.9)
IMM GRANULOCYTES NFR BLD AUTO: 0.4 % (ref 0–0.9)
KETONES UR QL STRIP.AUTO: 80 MG/DL
LEUKOCYTE ESTERASE UR QL STRIP.AUTO: NEGATIVE
LIPASE SERPL-CCNC: 6 U/L (ref 11–82)
LYMPHOCYTES # BLD AUTO: 0.75 K/UL (ref 1–4.8)
LYMPHOCYTES # BLD AUTO: 0.94 K/UL (ref 1–4.8)
LYMPHOCYTES NFR BLD: 12.2 % (ref 22–41)
LYMPHOCYTES NFR BLD: 8 % (ref 22–41)
MCH RBC QN AUTO: 28.7 PG (ref 27–33)
MCH RBC QN AUTO: 29.5 PG (ref 27–33)
MCHC RBC AUTO-ENTMCNC: 33.1 G/DL (ref 33.6–35)
MCHC RBC AUTO-ENTMCNC: 34.4 G/DL (ref 33.6–35)
MCV RBC AUTO: 85.7 FL (ref 81.4–97.8)
MCV RBC AUTO: 86.7 FL (ref 81.4–97.8)
MONOCYTES # BLD AUTO: 0.61 K/UL (ref 0–0.85)
MONOCYTES # BLD AUTO: 0.63 K/UL (ref 0–0.85)
MONOCYTES NFR BLD AUTO: 6.7 % (ref 0–13.4)
MONOCYTES NFR BLD AUTO: 7.9 % (ref 0–13.4)
MORPHOLOGY BLD-IMP: NORMAL
NEUTROPHILS # BLD AUTO: 6.09 K/UL (ref 2–7.15)
NEUTROPHILS # BLD AUTO: 7.89 K/UL (ref 2–7.15)
NEUTROPHILS NFR BLD: 79 % (ref 44–72)
NEUTROPHILS NFR BLD: 84.4 % (ref 44–72)
NITRITE UR QL STRIP.AUTO: NEGATIVE
NRBC # BLD AUTO: 0 K/UL
NRBC # BLD AUTO: 0 K/UL
NRBC BLD AUTO-RTO: 0 /100 WBC
NRBC BLD AUTO-RTO: 0 /100 WBC
PH UR STRIP.AUTO: 5.5 [PH]
PLATELET # BLD AUTO: 207 K/UL (ref 164–446)
PLATELET # BLD AUTO: 208 K/UL (ref 164–446)
PMV BLD AUTO: 10.2 FL (ref 9–12.9)
PMV BLD AUTO: 10.3 FL (ref 9–12.9)
POTASSIUM SERPL-SCNC: 4.1 MMOL/L (ref 3.6–5.5)
POTASSIUM SERPL-SCNC: 4.2 MMOL/L (ref 3.6–5.5)
POTASSIUM SERPL-SCNC: 4.4 MMOL/L (ref 3.6–5.5)
PROT SERPL-MCNC: 5.8 G/DL (ref 6–8.2)
PROT SERPL-MCNC: 7.4 G/DL (ref 6–8.2)
PROT UR QL STRIP: 30 MG/DL
RBC # BLD AUTO: 4.07 M/UL (ref 4.2–5.4)
RBC # BLD AUTO: 4.98 M/UL (ref 4.2–5.4)
RBC UR QL AUTO: NEGATIVE
SIGNIFICANT IND 70042: NORMAL
SITE SITE: NORMAL
SODIUM SERPL-SCNC: 135 MMOL/L (ref 135–145)
SODIUM SERPL-SCNC: 136 MMOL/L (ref 135–145)
SODIUM SERPL-SCNC: 136 MMOL/L (ref 135–145)
SOURCE SOURCE: NORMAL
SP GR UR: 1.01
WBC # BLD AUTO: 7.7 K/UL (ref 4.8–10.8)
WBC # BLD AUTO: 9.4 K/UL (ref 4.8–10.8)

## 2017-11-07 PROCEDURE — 96374 THER/PROPH/DIAG INJ IV PUSH: CPT

## 2017-11-07 PROCEDURE — 700111 HCHG RX REV CODE 636 W/ 250 OVERRIDE (IP): Performed by: EMERGENCY MEDICINE

## 2017-11-07 PROCEDURE — 82010 KETONE BODYS QUAN: CPT

## 2017-11-07 PROCEDURE — 90471 IMMUNIZATION ADMIN: CPT

## 2017-11-07 PROCEDURE — 80048 BASIC METABOLIC PNL TOTAL CA: CPT

## 2017-11-07 PROCEDURE — 82962 GLUCOSE BLOOD TEST: CPT

## 2017-11-07 PROCEDURE — 94760 N-INVAS EAR/PLS OXIMETRY 1: CPT

## 2017-11-07 PROCEDURE — 93005 ELECTROCARDIOGRAM TRACING: CPT

## 2017-11-07 PROCEDURE — 96375 TX/PRO/DX INJ NEW DRUG ADDON: CPT

## 2017-11-07 PROCEDURE — 3E0234Z INTRODUCTION OF SERUM, TOXOID AND VACCINE INTO MUSCLE, PERCUTANEOUS APPROACH: ICD-10-PCS | Performed by: INTERNAL MEDICINE

## 2017-11-07 PROCEDURE — 700105 HCHG RX REV CODE 258: Performed by: INTERNAL MEDICINE

## 2017-11-07 PROCEDURE — 99223 1ST HOSP IP/OBS HIGH 75: CPT | Performed by: INTERNAL MEDICINE

## 2017-11-07 PROCEDURE — 70487 CT MAXILLOFACIAL W/DYE: CPT

## 2017-11-07 PROCEDURE — 99285 EMERGENCY DEPT VISIT HI MDM: CPT

## 2017-11-07 PROCEDURE — 83690 ASSAY OF LIPASE: CPT

## 2017-11-07 PROCEDURE — 700102 HCHG RX REV CODE 250 W/ 637 OVERRIDE(OP): Performed by: INTERNAL MEDICINE

## 2017-11-07 PROCEDURE — 90686 IIV4 VACC NO PRSV 0.5 ML IM: CPT | Performed by: INTERNAL MEDICINE

## 2017-11-07 PROCEDURE — 80053 COMPREHEN METABOLIC PANEL: CPT

## 2017-11-07 PROCEDURE — 700111 HCHG RX REV CODE 636 W/ 250 OVERRIDE (IP): Performed by: INTERNAL MEDICINE

## 2017-11-07 PROCEDURE — 81002 URINALYSIS NONAUTO W/O SCOPE: CPT

## 2017-11-07 PROCEDURE — 87081 CULTURE SCREEN ONLY: CPT

## 2017-11-07 PROCEDURE — 87880 STREP A ASSAY W/OPTIC: CPT

## 2017-11-07 PROCEDURE — 81025 URINE PREGNANCY TEST: CPT

## 2017-11-07 PROCEDURE — 93005 ELECTROCARDIOGRAM TRACING: CPT | Performed by: EMERGENCY MEDICINE

## 2017-11-07 PROCEDURE — 86308 HETEROPHILE ANTIBODY SCREEN: CPT

## 2017-11-07 PROCEDURE — 85025 COMPLETE CBC W/AUTO DIFF WBC: CPT

## 2017-11-07 PROCEDURE — 96361 HYDRATE IV INFUSION ADD-ON: CPT

## 2017-11-07 PROCEDURE — 36415 COLL VENOUS BLD VENIPUNCTURE: CPT

## 2017-11-07 PROCEDURE — 770020 HCHG ROOM/CARE - TELE (206)

## 2017-11-07 PROCEDURE — 700105 HCHG RX REV CODE 258: Performed by: EMERGENCY MEDICINE

## 2017-11-07 PROCEDURE — 700117 HCHG RX CONTRAST REV CODE 255: Performed by: EMERGENCY MEDICINE

## 2017-11-07 PROCEDURE — A9270 NON-COVERED ITEM OR SERVICE: HCPCS | Performed by: INTERNAL MEDICINE

## 2017-11-07 RX ORDER — BISACODYL 10 MG
10 SUPPOSITORY, RECTAL RECTAL
Status: DISCONTINUED | OUTPATIENT
Start: 2017-11-07 | End: 2017-11-11 | Stop reason: HOSPADM

## 2017-11-07 RX ORDER — ONDANSETRON 2 MG/ML
4 INJECTION INTRAMUSCULAR; INTRAVENOUS EVERY 4 HOURS PRN
Status: DISCONTINUED | OUTPATIENT
Start: 2017-11-07 | End: 2017-11-11 | Stop reason: HOSPADM

## 2017-11-07 RX ORDER — LEVOFLOXACIN 5 MG/ML
750 INJECTION, SOLUTION INTRAVENOUS EVERY 24 HOURS
Status: DISCONTINUED | OUTPATIENT
Start: 2017-11-07 | End: 2017-11-08

## 2017-11-07 RX ORDER — DEXTROSE MONOHYDRATE 25 G/50ML
25 INJECTION, SOLUTION INTRAVENOUS
Status: DISCONTINUED | OUTPATIENT
Start: 2017-11-07 | End: 2017-11-11 | Stop reason: HOSPADM

## 2017-11-07 RX ORDER — SODIUM CHLORIDE 9 MG/ML
1000 INJECTION, SOLUTION INTRAVENOUS ONCE
Status: COMPLETED | OUTPATIENT
Start: 2017-11-07 | End: 2017-11-07

## 2017-11-07 RX ORDER — DEXTROSE AND SODIUM CHLORIDE 5; .45 G/100ML; G/100ML
INJECTION, SOLUTION INTRAVENOUS CONTINUOUS
Status: DISCONTINUED | OUTPATIENT
Start: 2017-11-07 | End: 2017-11-07

## 2017-11-07 RX ORDER — DEXTROSE AND SODIUM CHLORIDE 10; .45 G/100ML; G/100ML
INJECTION, SOLUTION INTRAVENOUS CONTINUOUS
Status: DISCONTINUED | OUTPATIENT
Start: 2017-11-07 | End: 2017-11-07

## 2017-11-07 RX ORDER — KETOROLAC TROMETHAMINE 30 MG/ML
15 INJECTION, SOLUTION INTRAMUSCULAR; INTRAVENOUS ONCE
Status: COMPLETED | OUTPATIENT
Start: 2017-11-07 | End: 2017-11-07

## 2017-11-07 RX ORDER — ONDANSETRON 2 MG/ML
4 INJECTION INTRAMUSCULAR; INTRAVENOUS ONCE
Status: COMPLETED | OUTPATIENT
Start: 2017-11-07 | End: 2017-11-07

## 2017-11-07 RX ORDER — ONDANSETRON 4 MG/1
4 TABLET, ORALLY DISINTEGRATING ORAL EVERY 4 HOURS PRN
Status: DISCONTINUED | OUTPATIENT
Start: 2017-11-07 | End: 2017-11-11 | Stop reason: HOSPADM

## 2017-11-07 RX ORDER — PROMETHAZINE HYDROCHLORIDE 25 MG/1
12.5-25 SUPPOSITORY RECTAL EVERY 4 HOURS PRN
Status: DISCONTINUED | OUTPATIENT
Start: 2017-11-07 | End: 2017-11-07

## 2017-11-07 RX ORDER — SODIUM CHLORIDE 9 MG/ML
INJECTION, SOLUTION INTRAVENOUS CONTINUOUS
Status: DISCONTINUED | OUTPATIENT
Start: 2017-11-07 | End: 2017-11-11 | Stop reason: HOSPADM

## 2017-11-07 RX ORDER — SODIUM CHLORIDE 9 MG/ML
2000 INJECTION, SOLUTION INTRAVENOUS ONCE
Status: DISCONTINUED | OUTPATIENT
Start: 2017-11-07 | End: 2017-11-07

## 2017-11-07 RX ORDER — METOCLOPRAMIDE 10 MG/1
10 TABLET ORAL
Status: DISCONTINUED | OUTPATIENT
Start: 2017-11-07 | End: 2017-11-11 | Stop reason: HOSPADM

## 2017-11-07 RX ORDER — INSULIN GLARGINE 100 [IU]/ML
30 INJECTION, SOLUTION SUBCUTANEOUS 2 TIMES DAILY
Status: DISCONTINUED | OUTPATIENT
Start: 2017-11-07 | End: 2017-11-10

## 2017-11-07 RX ORDER — SODIUM CHLORIDE 450 MG/100ML
INJECTION, SOLUTION INTRAVENOUS CONTINUOUS
Status: DISCONTINUED | OUTPATIENT
Start: 2017-11-07 | End: 2017-11-07

## 2017-11-07 RX ORDER — AMOXICILLIN 250 MG
2 CAPSULE ORAL 2 TIMES DAILY
Status: DISCONTINUED | OUTPATIENT
Start: 2017-11-07 | End: 2017-11-11 | Stop reason: HOSPADM

## 2017-11-07 RX ORDER — POLYETHYLENE GLYCOL 3350 17 G/17G
1 POWDER, FOR SOLUTION ORAL
Status: DISCONTINUED | OUTPATIENT
Start: 2017-11-07 | End: 2017-11-11 | Stop reason: HOSPADM

## 2017-11-07 RX ORDER — ATORVASTATIN CALCIUM 20 MG/1
20 TABLET, FILM COATED ORAL
Status: DISCONTINUED | OUTPATIENT
Start: 2017-11-07 | End: 2017-11-11 | Stop reason: HOSPADM

## 2017-11-07 RX ORDER — MAGNESIUM SULFATE HEPTAHYDRATE 40 MG/ML
2 INJECTION, SOLUTION INTRAVENOUS
Status: DISCONTINUED | OUTPATIENT
Start: 2017-11-07 | End: 2017-11-07

## 2017-11-07 RX ORDER — PROMETHAZINE HYDROCHLORIDE 25 MG/1
12.5-25 TABLET ORAL EVERY 4 HOURS PRN
Status: DISCONTINUED | OUTPATIENT
Start: 2017-11-07 | End: 2017-11-07

## 2017-11-07 RX ORDER — HEPARIN SODIUM 5000 [USP'U]/ML
5000 INJECTION, SOLUTION INTRAVENOUS; SUBCUTANEOUS EVERY 8 HOURS
Status: DISCONTINUED | OUTPATIENT
Start: 2017-11-07 | End: 2017-11-11 | Stop reason: HOSPADM

## 2017-11-07 RX ORDER — MAGNESIUM SULFATE HEPTAHYDRATE 40 MG/ML
4 INJECTION, SOLUTION INTRAVENOUS EVERY 4 HOURS PRN
Status: DISCONTINUED | OUTPATIENT
Start: 2017-11-07 | End: 2017-11-07

## 2017-11-07 RX ORDER — METOCLOPRAMIDE HYDROCHLORIDE 5 MG/5ML
10 SOLUTION ORAL 3 TIMES DAILY PRN
Status: ON HOLD | COMMUNITY
End: 2018-04-08

## 2017-11-07 RX ORDER — ACETAMINOPHEN 325 MG/1
650 TABLET ORAL EVERY 6 HOURS PRN
Status: DISCONTINUED | OUTPATIENT
Start: 2017-11-07 | End: 2017-11-11 | Stop reason: HOSPADM

## 2017-11-07 RX ADMIN — INSULIN LISPRO 5 UNITS: 100 INJECTION, SOLUTION INTRAVENOUS; SUBCUTANEOUS at 17:53

## 2017-11-07 RX ADMIN — METOCLOPRAMIDE HYDROCHLORIDE 10 MG: 10 TABLET ORAL at 17:31

## 2017-11-07 RX ADMIN — IOHEXOL 95 ML: 350 INJECTION, SOLUTION INTRAVENOUS at 13:17

## 2017-11-07 RX ADMIN — STANDARDIZED SENNA CONCENTRATE AND DOCUSATE SODIUM 2 TABLET: 8.6; 5 TABLET, FILM COATED ORAL at 20:37

## 2017-11-07 RX ADMIN — ONDANSETRON 4 MG: 2 INJECTION INTRAMUSCULAR; INTRAVENOUS at 10:58

## 2017-11-07 RX ADMIN — SODIUM CHLORIDE 1000 ML: 9 INJECTION, SOLUTION INTRAVENOUS at 11:45

## 2017-11-07 RX ADMIN — SODIUM CHLORIDE 1000 ML: 9 INJECTION, SOLUTION INTRAVENOUS at 10:58

## 2017-11-07 RX ADMIN — LEVOFLOXACIN 750 MG: 5 INJECTION, SOLUTION INTRAVENOUS at 17:40

## 2017-11-07 RX ADMIN — KETOROLAC TROMETHAMINE 15 MG: 30 INJECTION, SOLUTION INTRAMUSCULAR at 10:59

## 2017-11-07 RX ADMIN — ACETAMINOPHEN 650 MG: 325 TABLET, FILM COATED ORAL at 17:31

## 2017-11-07 RX ADMIN — HEPARIN SODIUM 5000 UNITS: 5000 INJECTION, SOLUTION INTRAVENOUS; SUBCUTANEOUS at 20:36

## 2017-11-07 RX ADMIN — INSULIN GLARGINE 30 UNITS: 100 INJECTION, SOLUTION SUBCUTANEOUS at 20:37

## 2017-11-07 RX ADMIN — INSULIN LISPRO 4 UNITS: 100 INJECTION, SOLUTION INTRAVENOUS; SUBCUTANEOUS at 20:38

## 2017-11-07 RX ADMIN — SODIUM CHLORIDE: 9 INJECTION, SOLUTION INTRAVENOUS at 12:45

## 2017-11-07 RX ADMIN — ATORVASTATIN CALCIUM 20 MG: 20 TABLET, FILM COATED ORAL at 20:37

## 2017-11-07 RX ADMIN — INFLUENZA A VIRUS A/MICHIGAN/45/2015 X-275 (H1N1) ANTIGEN (FORMALDEHYDE INACTIVATED), INFLUENZA A VIRUS A/HONG KONG/4801/2014 X-263B (H3N2) ANTIGEN (FORMALDEHYDE INACTIVATED), INFLUENZA B VIRUS B/PHUKET/3073/2013 ANTIGEN (FORMALDEHYDE INACTIVATED), AND INFLUENZA B VIRUS B/BRISBANE/60/2008 ANTIGEN (FORMALDEHYDE INACTIVATED) 0.5 ML: 15; 15; 15; 15 INJECTION, SUSPENSION INTRAMUSCULAR at 20:54

## 2017-11-07 ASSESSMENT — PATIENT HEALTH QUESTIONNAIRE - PHQ9
1. LITTLE INTEREST OR PLEASURE IN DOING THINGS: NOT AT ALL
SUM OF ALL RESPONSES TO PHQ QUESTIONS 1-9: 0
SUM OF ALL RESPONSES TO PHQ9 QUESTIONS 1 AND 2: 0
2. FEELING DOWN, DEPRESSED, IRRITABLE, OR HOPELESS: NOT AT ALL

## 2017-11-07 ASSESSMENT — ENCOUNTER SYMPTOMS
ABDOMINAL PAIN: 1
HEARTBURN: 0
SHORTNESS OF BREATH: 0
HEADACHES: 0
CHILLS: 0
SORE THROAT: 1
BLURRED VISION: 0
MYALGIAS: 0
NECK PAIN: 0
LOSS OF CONSCIOUSNESS: 0
FEVER: 0
VOMITING: 0
FOCAL WEAKNESS: 0
DEPRESSION: 0
WEAKNESS: 1
NAUSEA: 1
DIZZINESS: 0

## 2017-11-07 ASSESSMENT — COGNITIVE AND FUNCTIONAL STATUS - GENERAL
MOBILITY SCORE: 24
SUGGESTED CMS G CODE MODIFIER DAILY ACTIVITY: CH
DAILY ACTIVITIY SCORE: 24
SUGGESTED CMS G CODE MODIFIER MOBILITY: CH

## 2017-11-07 ASSESSMENT — COPD QUESTIONNAIRES
DO YOU EVER COUGH UP ANY MUCUS OR PHLEGM?: NO/ONLY WITH OCCASIONAL COLDS OR INFECTIONS
HAVE YOU SMOKED AT LEAST 100 CIGARETTES IN YOUR ENTIRE LIFE: NO/DON'T KNOW
COPD SCREENING SCORE: 0
DURING THE PAST 4 WEEKS HOW MUCH DID YOU FEEL SHORT OF BREATH: NONE/LITTLE OF THE TIME

## 2017-11-07 ASSESSMENT — PAIN SCALES - GENERAL
PAINLEVEL_OUTOF10: 8
PAINLEVEL_OUTOF10: 8
PAINLEVEL_OUTOF10: 3

## 2017-11-07 ASSESSMENT — LIFESTYLE VARIABLES
EVER_SMOKED: NEVER
ALCOHOL_USE: NO
DO YOU DRINK ALCOHOL: NO

## 2017-11-07 NOTE — PROGRESS NOTES
Talked to RN re: anion gap down to 7. RN to discuss with MD and call back on admit orders to ICU.

## 2017-11-07 NOTE — ED NOTES
Per admitting MD, allow 2L of NS ordered in ED to complete and redraw CMP. IV fluids placed on pressure bag.

## 2017-11-07 NOTE — ED PROVIDER NOTES
ED Provider Note    Scribed for Yenifer Holder M.D. by Elisha Baeza. 11/7/2017, 10:05 AM.    Primary care provider: Navi Huber M.D.  Means of arrival: walk-in   History obtained from: Patient  History limited by: None    CHIEF COMPLAINT  Chief Complaint   Patient presents with   • Sore Throat     Since sunday.  Painful swallowing.    • High Blood Sugar     397 in triage.        HPI  Alis Sparks is a 27 y.o. female who presents to the Emergency Department for sore throat. Patient reports sore throat began three days ago. She states associated pain with swallowing and nausea. Patient reports she took Aleve with mild relief of sore throat. Denies fever, vomiting, cough, or diarrhea. Patient reports history of diabetes. She states her blood sugar was high in the ED triage today when checked, but she denies recent complications with diabetes. Denies positive sick contact. Patient reports allergy to penicillin, promethazine, and lisinopril.     REVIEW OF SYSTEMS  Pertinent positives include sore throat, nausea, difficulty swallowing, high blood sugar. Pertinent negatives include no fever, vomiting, cough, or diarrhea. As above, all other systems reviewed and are negative.   See HPI for further details.   C    PAST MEDICAL HISTORY  Past Medical History:   Diagnosis Date   • Backpain    • Bronchitis    • Diabetes type 1, controlled (CMS-[facility])     tests 4-5 times daily   • DKA (diabetic ketoacidoses) (CMS-[facility])    • Fall    • Gastroparesis    • Kidney infection    • Pneumonia    • UTI (urinary tract infection)      SURGICAL HISTORY  Past Surgical History:   Procedure Laterality Date   • GASTROSCOPY-ENDO  9/18/2014    Performed by Sylvain PERRY M.D. at ENDOSCOPY [facility]   • GASTROSCOPY WITH BIOPSY  9/18/2014    Performed by Sylvain PERRY M.D. at ENDOSCOPY [facility]   • OTHER      surgery to patch lung after pneumor from central line placement     SOCIAL HISTORY  Social History  "  Substance Use Topics   • Smoking status: Never Smoker   • Smokeless tobacco: Never Used   • Alcohol use No      History   Drug Use No     FAMILY HISTORY  Family History   Problem Relation Age of Onset   • Cancer       breasts, multiple family members     CURRENT MEDICATIONS  Home Medications     Reviewed by Teena Mock R.N. (Registered Nurse) on 11/07/17 at 0847  Med List Status: Partial   Medication Last Dose Status   atorvastatin (LIPITOR) 20 MG Tab 11/6/2017 Active   insulin aspart (NOVOLOG) 100 UNIT/ML Solution 11/6/2017 Active   insulin glargine (LANTUS) 100 UNIT/ML Solution 11/7/2017 Active   metoclopramide (REGLAN) 10 MG Tab 11/6/2017 Active              ALLERGIES  Allergies   Allergen Reactions   • Pcn [Penicillins] Shortness of Breath and Swelling     Per patient's Mom, patient tolerates Keflex   • Promethazine Vomiting   • Lisinopril      Per historical: Reported pedal swelling. No facial/angioedema or rash nor respiratory symptoms.      PHYSICAL EXAM  VITAL SIGNS: /78   Pulse (!) 123   Temp 36.6 °C (97.9 °F)   Resp 14   Ht 1.651 m (5' 5\")   Wt 76.8 kg (169 lb 5 oz)   LMP 10/20/2017 (Approximate)   SpO2 97%   BMI 28.18 kg/m²   Vitals reviewed.    Consitutional: Well-developed, well-nourished. Negative for: distress.  HENT: Normocephalic, right external ear normal, left external ear normal, moderate erythema to bilateral tonsil pillars, uvula is midline, no exudates noticed, mucus membranes are dry  Eyes: Conjunctivae normal, extraocular movements normal. Negative for: discharge in right and left eye, icterus.  Neck: Range of motion normal, supple. positive left anterior cervical lymphadenopathy   Cardiovascular:Very tachycardic, regular rhythm, heart sounds normal, intact distal pulses. Negative for: murmur, rub, gallop.  Pulmonary/Chest Wall: Effort normal, breath sounds normal. Negative for: respiratory distress, wheezes, rales, rhonchi.   Abdominal: Soft, bowel sounds normal. " Negative for: distention, tenderness, rebound, guarding.  Musculoskeletal: Normal range of motion. Negative for edema.  Neurological: Alert and oriented x3. No focal deficits.  Skin: Warm, dry. Negative for rash.  Psych: Mood/affect normal, behavior normal, judgment normal.      DIAGNOSTIC STUDIES / PROCEDURES    LABS  Results for orders placed or performed during the hospital encounter of 11/07/17   RAPID STREP, CULT IF INDICATED (CULTURE IF NEGATIVE)   Result Value Ref Range    Significant Indicator NEG     Source THRT     Site THROAT     Rapid Strep Screen       Negative for Group A streptococcus.  A negative result may be obtained if the specimen is  inadequate or antigen concentration is below the  sensitivity of the test. This negative test will be followed  up with a culture as requested.     CBC WITH DIFFERENTIAL   Result Value Ref Range    WBC 9.4 4.8 - 10.8 K/uL    RBC 4.98 4.20 - 5.40 M/uL    Hemoglobin 14.7 12.0 - 16.0 g/dL    Hematocrit 42.7 37.0 - 47.0 %    MCV 85.7 81.4 - 97.8 fL    MCH 29.5 27.0 - 33.0 pg    MCHC 34.4 33.6 - 35.0 g/dL    RDW 40.6 35.9 - 50.0 fL    Platelet Count 208 164 - 446 K/uL    MPV 10.3 9.0 - 12.9 fL    Neutrophils-Polys 84.40 (H) 44.00 - 72.00 %    Lymphocytes 8.00 (L) 22.00 - 41.00 %    Monocytes 6.70 0.00 - 13.40 %    Eosinophils 0.20 0.00 - 6.90 %    Basophils 0.30 0.00 - 1.80 %    Immature Granulocytes 0.40 0.00 - 0.90 %    Nucleated RBC 0.00 /100 WBC    Lymphs (Absolute) 0.75 (L) 1.00 - 4.80 K/uL    Monos (Absolute) 0.63 0.00 - 0.85 K/uL    Eos (Absolute) 0.02 0.00 - 0.51 K/uL    Baso (Absolute) 0.03 0.00 - 0.12 K/uL    Immature Granulocytes (abs) 0.04 0.00 - 0.11 K/uL    NRBC (Absolute) 0.00 K/uL    Neutrophils (Absolute) 7.89 (H) 2.00 - 7.15 K/uL   COMP METABOLIC PANEL   Result Value Ref Range    Sodium 136 135 - 145 mmol/L    Potassium 4.4 3.6 - 5.5 mmol/L    Chloride 100 96 - 112 mmol/L    Co2 24 20 - 33 mmol/L    Anion Gap 12.0 (H) 0.0 - 11.9    Glucose 290 (H) 65 -  99 mg/dL    Bun 14 8 - 22 mg/dL    Creatinine 0.67 0.50 - 1.40 mg/dL    Calcium 9.8 8.5 - 10.5 mg/dL    AST(SGOT) 14 12 - 45 U/L    ALT(SGPT) 12 2 - 50 U/L    Alkaline Phosphatase 147 (H) 30 - 99 U/L    Total Bilirubin 0.6 0.1 - 1.5 mg/dL    Albumin 4.3 3.2 - 4.9 g/dL    Total Protein 7.4 6.0 - 8.2 g/dL    Globulin 3.1 1.9 - 3.5 g/dL    A-G Ratio 1.4 g/dL   LIPASE   Result Value Ref Range    Lipase 6 (L) 11 - 82 U/L   MONONUCLEOSIS TEST QUAL   Result Value Ref Range    Heterophile Screen Negative Negative   BETA-HYDROXYBUTYRIC ACID   Result Value Ref Range    beta-Hydroxybutyric Acid 0.42 (H) 0.02 - 0.27 mmol/L   ESTIMATED GFR   Result Value Ref Range    GFR If African American >60 >60 mL/min/1.73 m 2    GFR If Non African American >60 >60 mL/min/1.73 m 2   PERIPHERAL SMEAR REVIEW   Result Value Ref Range    Peripheral Smear Review see below    DIFFERENTIAL COMMENT   Result Value Ref Range    Comments-Diff see below    ACCU-CHEK GLUCOSE   Result Value Ref Range    Glucose - Accu-Ck 397 (H) 65 - 99 mg/dL   POC UA   Result Value Ref Range    POC Color Yellow     POC Appearance Clear     POC Glucose 500 (A) Negative mg/dL    POC Ketones 80 (A) Negative mg/dL    POC Specific Gravity 1.010 1.005 - 1.030    POC Blood Negative Negative    POC Urine PH 5.5 5.0 - 8.0    POC Protein 30 (A) Negative mg/dL    POC Nitrites Negative Negative    POC Leukocyte Esterase Negative Negative   POC URINE PREGNANCY   Result Value Ref Range    POC Urine Pregnancy Test Negative Negative     All labs reviewed by me.      COURSE & MEDICAL DECISION MAKING  Nursing notes, VS, PMSFHx reviewed in chart.    10:05 AM Patient seen and examined at bedside. The patient presents with moderate erythema to bilateral tonsil pillars and positive left cervical lymphadenopathy. No exudates noticed. Patient has history of diabetes and her blood sugar is currently high. The differential diagnosis includes but is not limited toStrep, mononucleosis, DKA,  dehydration. Ordered EKG, Accu-Chek Glucose, Rapid Strep, Serum Acetone Quant, Mononucleosis Test Qual, Lipase, CMP, CBC with differential, POC Urine Pregnancy, POC Urinalysis. Patient will be treated with NS infusion 1,000 mL for high blood sugar and dehydration. She will also be treated with ketorolac 15 mg and ondansetron 4mg  for her symptoms.      11:33 AM I reviewed patient's lab results and paged hospitalist. Continued IV fluid bolus due to borderline anion gap DKA    11:48 AM I discussed the patient's case and the above findings with Dr. Luis (hospitalist) who agrees to admit patient.     CRITICAL CARE  The very real possibilty of a deterioration of this patient's condition required the highest level of my preparedness for sudden, emergent intervention.  I provided critical care services, which included medication orders, frequent reevaluations of the patient's condition and response to treatment, ordering and reviewing test results, and discussing the case with various consultants.  The critical care time associated with the care of the patient was 35 minutes. Review chart for interventions. This time is exclusive of any other billable procedures.       DISPOSITION:  Patient will be admitted to hospitalist in guarded condition.    FINAL IMPRESSION  1. Diabetic ketoacidosis without coma associated with type 1 diabetes mellitus (CMS-HCC)    2. Pharyngitis due to other organism    3. Nausea        Elisha BRAMBILA (Annia), am scribing for, and in the presence of, Yenifer Holder M.D..    Electronically signed by: Elisha Baeza (Annia), 11/7/2017    Yenifer BRAMBILA M.D. personally performed the services described in this documentation, as scribed by Elisha Baeza in my presence, and it is both accurate and complete.    The note accurately reflects work and decisions made by me.  Yenifer Holder  11/7/2017  3:16 PM

## 2017-11-08 ENCOUNTER — PATIENT OUTREACH (OUTPATIENT)
Dept: HEALTH INFORMATION MANAGEMENT | Facility: OTHER | Age: 27
End: 2017-11-08

## 2017-11-08 ENCOUNTER — APPOINTMENT (OUTPATIENT)
Dept: RADIOLOGY | Facility: MEDICAL CENTER | Age: 27
DRG: 137 | End: 2017-11-08
Attending: HOSPITALIST
Payer: MEDICAID

## 2017-11-08 PROBLEM — M27.2 MANDIBULAR ABSCESS: Status: ACTIVE | Noted: 2017-11-08

## 2017-11-08 LAB
ANION GAP SERPL CALC-SCNC: 11 MMOL/L (ref 0–11.9)
ANION GAP SERPL CALC-SCNC: 11 MMOL/L (ref 0–11.9)
ANION GAP SERPL CALC-SCNC: 12 MMOL/L (ref 0–11.9)
ANION GAP SERPL CALC-SCNC: 9 MMOL/L (ref 0–11.9)
ANISOCYTOSIS BLD QL SMEAR: ABNORMAL
BASOPHILS # BLD AUTO: 0 % (ref 0–1.8)
BASOPHILS # BLD AUTO: 0.5 % (ref 0–1.8)
BASOPHILS # BLD: 0 K/UL (ref 0–0.12)
BASOPHILS # BLD: 0.03 K/UL (ref 0–0.12)
BUN SERPL-MCNC: 10 MG/DL (ref 8–22)
BUN SERPL-MCNC: 12 MG/DL (ref 8–22)
BUN SERPL-MCNC: 14 MG/DL (ref 8–22)
BUN SERPL-MCNC: 8 MG/DL (ref 8–22)
CALCIUM SERPL-MCNC: 8.1 MG/DL (ref 8.5–10.5)
CALCIUM SERPL-MCNC: 8.2 MG/DL (ref 8.5–10.5)
CALCIUM SERPL-MCNC: 8.2 MG/DL (ref 8.5–10.5)
CALCIUM SERPL-MCNC: 8.7 MG/DL (ref 8.5–10.5)
CHLORIDE SERPL-SCNC: 102 MMOL/L (ref 96–112)
CHLORIDE SERPL-SCNC: 106 MMOL/L (ref 96–112)
CHLORIDE SERPL-SCNC: 107 MMOL/L (ref 96–112)
CHLORIDE SERPL-SCNC: 107 MMOL/L (ref 96–112)
CO2 SERPL-SCNC: 17 MMOL/L (ref 20–33)
CO2 SERPL-SCNC: 17 MMOL/L (ref 20–33)
CO2 SERPL-SCNC: 20 MMOL/L (ref 20–33)
CO2 SERPL-SCNC: 20 MMOL/L (ref 20–33)
CREAT SERPL-MCNC: 0.37 MG/DL (ref 0.5–1.4)
CREAT SERPL-MCNC: 0.38 MG/DL (ref 0.5–1.4)
CREAT SERPL-MCNC: 0.38 MG/DL (ref 0.5–1.4)
CREAT SERPL-MCNC: 0.52 MG/DL (ref 0.5–1.4)
EOSINOPHIL # BLD AUTO: 0 K/UL (ref 0–0.51)
EOSINOPHIL # BLD AUTO: 0.07 K/UL (ref 0–0.51)
EOSINOPHIL NFR BLD: 0 % (ref 0–6.9)
EOSINOPHIL NFR BLD: 1.2 % (ref 0–6.9)
ERYTHROCYTE [DISTWIDTH] IN BLOOD BY AUTOMATED COUNT: 41.1 FL (ref 35.9–50)
ERYTHROCYTE [DISTWIDTH] IN BLOOD BY AUTOMATED COUNT: 41.6 FL (ref 35.9–50)
GFR SERPL CREATININE-BSD FRML MDRD: >60 ML/MIN/1.73 M 2
GLUCOSE BLD-MCNC: 208 MG/DL (ref 65–99)
GLUCOSE BLD-MCNC: 220 MG/DL (ref 65–99)
GLUCOSE BLD-MCNC: 255 MG/DL (ref 65–99)
GLUCOSE BLD-MCNC: 280 MG/DL (ref 65–99)
GLUCOSE SERPL-MCNC: 190 MG/DL (ref 65–99)
GLUCOSE SERPL-MCNC: 237 MG/DL (ref 65–99)
GLUCOSE SERPL-MCNC: 266 MG/DL (ref 65–99)
GLUCOSE SERPL-MCNC: 275 MG/DL (ref 65–99)
HCT VFR BLD AUTO: 36.1 % (ref 37–47)
HCT VFR BLD AUTO: 40.5 % (ref 37–47)
HGB BLD-MCNC: 11.7 G/DL (ref 12–16)
HGB BLD-MCNC: 13.2 G/DL (ref 12–16)
IMM GRANULOCYTES # BLD AUTO: 0.03 K/UL (ref 0–0.11)
IMM GRANULOCYTES NFR BLD AUTO: 0.5 % (ref 0–0.9)
LACTATE BLD-SCNC: 1.5 MMOL/L (ref 0.5–2)
LACTATE BLD-SCNC: 2.8 MMOL/L (ref 0.5–2)
LYMPHOCYTES # BLD AUTO: 1.43 K/UL (ref 1–4.8)
LYMPHOCYTES # BLD AUTO: 1.68 K/UL (ref 1–4.8)
LYMPHOCYTES NFR BLD: 23.8 % (ref 22–41)
LYMPHOCYTES NFR BLD: 33 % (ref 22–41)
MAGNESIUM SERPL-MCNC: 2 MG/DL (ref 1.5–2.5)
MANUAL DIFF BLD: NORMAL
MCH RBC QN AUTO: 28.3 PG (ref 27–33)
MCH RBC QN AUTO: 28.7 PG (ref 27–33)
MCHC RBC AUTO-ENTMCNC: 32.4 G/DL (ref 33.6–35)
MCHC RBC AUTO-ENTMCNC: 32.6 G/DL (ref 33.6–35)
MCV RBC AUTO: 87.2 FL (ref 81.4–97.8)
MCV RBC AUTO: 88 FL (ref 81.4–97.8)
MICROCYTES BLD QL SMEAR: ABNORMAL
MONOCYTES # BLD AUTO: 0 K/UL (ref 0–0.85)
MONOCYTES # BLD AUTO: 0.72 K/UL (ref 0–0.85)
MONOCYTES NFR BLD AUTO: 0 % (ref 0–13.4)
MONOCYTES NFR BLD AUTO: 12 % (ref 0–13.4)
MORPHOLOGY BLD-IMP: NORMAL
NEUTROPHILS # BLD AUTO: 3.42 K/UL (ref 2–7.15)
NEUTROPHILS # BLD AUTO: 3.73 K/UL (ref 2–7.15)
NEUTROPHILS NFR BLD: 62 % (ref 44–72)
NEUTROPHILS NFR BLD: 67 % (ref 44–72)
NRBC # BLD AUTO: 0 K/UL
NRBC # BLD AUTO: 0 K/UL
NRBC BLD AUTO-RTO: 0 /100 WBC
NRBC BLD AUTO-RTO: 0 /100 WBC
PLATELET # BLD AUTO: 186 K/UL (ref 164–446)
PLATELET # BLD AUTO: 205 K/UL (ref 164–446)
PMV BLD AUTO: 9.9 FL (ref 9–12.9)
PMV BLD AUTO: 9.9 FL (ref 9–12.9)
POTASSIUM SERPL-SCNC: 3.9 MMOL/L (ref 3.6–5.5)
POTASSIUM SERPL-SCNC: 4.1 MMOL/L (ref 3.6–5.5)
POTASSIUM SERPL-SCNC: 4.2 MMOL/L (ref 3.6–5.5)
POTASSIUM SERPL-SCNC: 4.2 MMOL/L (ref 3.6–5.5)
RBC # BLD AUTO: 4.14 M/UL (ref 4.2–5.4)
RBC # BLD AUTO: 4.6 M/UL (ref 4.2–5.4)
RBC BLD AUTO: PRESENT
SODIUM SERPL-SCNC: 134 MMOL/L (ref 135–145)
SODIUM SERPL-SCNC: 134 MMOL/L (ref 135–145)
SODIUM SERPL-SCNC: 135 MMOL/L (ref 135–145)
SODIUM SERPL-SCNC: 136 MMOL/L (ref 135–145)
WBC # BLD AUTO: 5.1 K/UL (ref 4.8–10.8)
WBC # BLD AUTO: 6 K/UL (ref 4.8–10.8)

## 2017-11-08 PROCEDURE — 700111 HCHG RX REV CODE 636 W/ 250 OVERRIDE (IP)

## 2017-11-08 PROCEDURE — 160002 HCHG RECOVERY MINUTES (STAT): Performed by: ORAL & MAXILLOFACIAL SURGERY

## 2017-11-08 PROCEDURE — 82962 GLUCOSE BLOOD TEST: CPT | Mod: 91

## 2017-11-08 PROCEDURE — 700101 HCHG RX REV CODE 250

## 2017-11-08 PROCEDURE — A9270 NON-COVERED ITEM OR SERVICE: HCPCS | Performed by: FAMILY MEDICINE

## 2017-11-08 PROCEDURE — 700111 HCHG RX REV CODE 636 W/ 250 OVERRIDE (IP): Performed by: HOSPITALIST

## 2017-11-08 PROCEDURE — 700111 HCHG RX REV CODE 636 W/ 250 OVERRIDE (IP): Performed by: INTERNAL MEDICINE

## 2017-11-08 PROCEDURE — 770020 HCHG ROOM/CARE - TELE (206)

## 2017-11-08 PROCEDURE — 87070 CULTURE OTHR SPECIMN AEROBIC: CPT

## 2017-11-08 PROCEDURE — 302131 K PAD MOTOR: Performed by: HOSPITALIST

## 2017-11-08 PROCEDURE — 700101 HCHG RX REV CODE 250: Performed by: HOSPITALIST

## 2017-11-08 PROCEDURE — 700105 HCHG RX REV CODE 258: Performed by: HOSPITALIST

## 2017-11-08 PROCEDURE — 80048 BASIC METABOLIC PNL TOTAL CA: CPT

## 2017-11-08 PROCEDURE — 160048 HCHG OR STATISTICAL LEVEL 1-5: Performed by: ORAL & MAXILLOFACIAL SURGERY

## 2017-11-08 PROCEDURE — 83735 ASSAY OF MAGNESIUM: CPT

## 2017-11-08 PROCEDURE — 160009 HCHG ANES TIME/MIN: Performed by: ORAL & MAXILLOFACIAL SURGERY

## 2017-11-08 PROCEDURE — A9270 NON-COVERED ITEM OR SERVICE: HCPCS | Performed by: INTERNAL MEDICINE

## 2017-11-08 PROCEDURE — 99233 SBSQ HOSP IP/OBS HIGH 50: CPT | Performed by: HOSPITALIST

## 2017-11-08 PROCEDURE — 0C95XZZ DRAINAGE OF UPPER GINGIVA, EXTERNAL APPROACH: ICD-10-PCS | Performed by: ORAL & MAXILLOFACIAL SURGERY

## 2017-11-08 PROCEDURE — 700101 HCHG RX REV CODE 250: Performed by: FAMILY MEDICINE

## 2017-11-08 PROCEDURE — 0CTX0Z0 RESECTION OF LOWER TOOTH, SINGLE, OPEN APPROACH: ICD-10-PCS | Performed by: ORAL & MAXILLOFACIAL SURGERY

## 2017-11-08 PROCEDURE — 83605 ASSAY OF LACTIC ACID: CPT

## 2017-11-08 PROCEDURE — 0CTW0Z1 RESECTION OF UPPER TOOTH, MULTIPLE, OPEN APPROACH: ICD-10-PCS | Performed by: ORAL & MAXILLOFACIAL SURGERY

## 2017-11-08 PROCEDURE — 87040 BLOOD CULTURE FOR BACTERIA: CPT

## 2017-11-08 PROCEDURE — 501838 HCHG SUTURE GENERAL: Performed by: ORAL & MAXILLOFACIAL SURGERY

## 2017-11-08 PROCEDURE — 500126 HCHG BOVIE, NEEDLE TIP: Performed by: ORAL & MAXILLOFACIAL SURGERY

## 2017-11-08 PROCEDURE — 700105 HCHG RX REV CODE 258: Performed by: INTERNAL MEDICINE

## 2017-11-08 PROCEDURE — 700102 HCHG RX REV CODE 250 W/ 637 OVERRIDE(OP): Performed by: FAMILY MEDICINE

## 2017-11-08 PROCEDURE — 87075 CULTR BACTERIA EXCEPT BLOOD: CPT

## 2017-11-08 PROCEDURE — 87205 SMEAR GRAM STAIN: CPT

## 2017-11-08 PROCEDURE — 500128 HCHG BOVIE, NEEDLE TIP 3CM: Performed by: ORAL & MAXILLOFACIAL SURGERY

## 2017-11-08 PROCEDURE — 160035 HCHG PACU - 1ST 60 MINS PHASE I: Performed by: ORAL & MAXILLOFACIAL SURGERY

## 2017-11-08 PROCEDURE — 70355 PANORAMIC X-RAY OF JAWS: CPT

## 2017-11-08 PROCEDURE — 85025 COMPLETE CBC W/AUTO DIFF WBC: CPT

## 2017-11-08 PROCEDURE — 160038 HCHG SURGERY MINUTES - EA ADDL 1 MIN LEVEL 2: Performed by: ORAL & MAXILLOFACIAL SURGERY

## 2017-11-08 PROCEDURE — 160036 HCHG PACU - EA ADDL 30 MINS PHASE I: Performed by: ORAL & MAXILLOFACIAL SURGERY

## 2017-11-08 PROCEDURE — 93005 ELECTROCARDIOGRAM TRACING: CPT | Performed by: HOSPITALIST

## 2017-11-08 PROCEDURE — 500383 HCHG DRAIN, PENROSE 3/8X12: Performed by: ORAL & MAXILLOFACIAL SURGERY

## 2017-11-08 PROCEDURE — 85027 COMPLETE CBC AUTOMATED: CPT

## 2017-11-08 PROCEDURE — 85007 BL SMEAR W/DIFF WBC COUNT: CPT

## 2017-11-08 PROCEDURE — 0W950ZZ DRAINAGE OF LOWER JAW, OPEN APPROACH: ICD-10-PCS | Performed by: ORAL & MAXILLOFACIAL SURGERY

## 2017-11-08 PROCEDURE — 302152 K-PAD 12X17: Performed by: HOSPITALIST

## 2017-11-08 PROCEDURE — 700102 HCHG RX REV CODE 250 W/ 637 OVERRIDE(OP)

## 2017-11-08 PROCEDURE — A6402 STERILE GAUZE <= 16 SQ IN: HCPCS | Performed by: ORAL & MAXILLOFACIAL SURGERY

## 2017-11-08 PROCEDURE — 160027 HCHG SURGERY MINUTES - 1ST 30 MINS LEVEL 2: Performed by: ORAL & MAXILLOFACIAL SURGERY

## 2017-11-08 PROCEDURE — 700102 HCHG RX REV CODE 250 W/ 637 OVERRIDE(OP): Performed by: INTERNAL MEDICINE

## 2017-11-08 PROCEDURE — 93010 ELECTROCARDIOGRAM REPORT: CPT | Performed by: INTERNAL MEDICINE

## 2017-11-08 PROCEDURE — 36415 COLL VENOUS BLD VENIPUNCTURE: CPT

## 2017-11-08 RX ORDER — DIPHENHYDRAMINE HYDROCHLORIDE 50 MG/ML
25 INJECTION INTRAMUSCULAR; INTRAVENOUS EVERY 6 HOURS PRN
Status: DISCONTINUED | OUTPATIENT
Start: 2017-11-08 | End: 2017-11-11 | Stop reason: HOSPADM

## 2017-11-08 RX ORDER — SODIUM CHLORIDE 9 MG/ML
2000 INJECTION, SOLUTION INTRAVENOUS ONCE
Status: COMPLETED | OUTPATIENT
Start: 2017-11-08 | End: 2017-11-08

## 2017-11-08 RX ORDER — METOPROLOL TARTRATE 1 MG/ML
2.5 INJECTION, SOLUTION INTRAVENOUS ONCE
Status: COMPLETED | OUTPATIENT
Start: 2017-11-08 | End: 2017-11-08

## 2017-11-08 RX ORDER — CEFAZOLIN SODIUM 2 G/100ML
2 INJECTION, SOLUTION INTRAVENOUS EVERY 8 HOURS
Status: DISCONTINUED | OUTPATIENT
Start: 2017-11-08 | End: 2017-11-11 | Stop reason: HOSPADM

## 2017-11-08 RX ORDER — ACETAMINOPHEN 325 MG/1
650 TABLET ORAL ONCE
Status: COMPLETED | OUTPATIENT
Start: 2017-11-08 | End: 2017-11-08

## 2017-11-08 RX ORDER — CEFTRIAXONE 1 G/1
1 INJECTION, POWDER, FOR SOLUTION INTRAMUSCULAR; INTRAVENOUS
Status: DISCONTINUED | OUTPATIENT
Start: 2017-11-08 | End: 2017-11-08

## 2017-11-08 RX ORDER — CEFAZOLIN SODIUM 2 G/100ML
2 INJECTION, SOLUTION INTRAVENOUS EVERY 8 HOURS
Status: DISCONTINUED | OUTPATIENT
Start: 2017-11-08 | End: 2017-11-08

## 2017-11-08 RX ORDER — SCOLOPAMINE TRANSDERMAL SYSTEM 1 MG/1
1 PATCH, EXTENDED RELEASE TRANSDERMAL
Status: DISCONTINUED | OUTPATIENT
Start: 2017-11-08 | End: 2017-11-11 | Stop reason: HOSPADM

## 2017-11-08 RX ORDER — CLINDAMYCIN PHOSPHATE 600 MG/50ML
600 INJECTION, SOLUTION INTRAVENOUS EVERY 8 HOURS
Status: DISCONTINUED | OUTPATIENT
Start: 2017-11-08 | End: 2017-11-08

## 2017-11-08 RX ORDER — METOPROLOL TARTRATE 1 MG/ML
INJECTION, SOLUTION INTRAVENOUS
Status: ACTIVE
Start: 2017-11-08 | End: 2017-11-09

## 2017-11-08 RX ORDER — METOPROLOL TARTRATE 1 MG/ML
5 INJECTION, SOLUTION INTRAVENOUS ONCE
Status: COMPLETED | OUTPATIENT
Start: 2017-11-08 | End: 2017-11-08

## 2017-11-08 RX ORDER — CLINDAMYCIN PHOSPHATE 600 MG/50ML
600 INJECTION, SOLUTION INTRAVENOUS EVERY 8 HOURS
Status: DISCONTINUED | OUTPATIENT
Start: 2017-11-09 | End: 2017-11-11 | Stop reason: HOSPADM

## 2017-11-08 RX ORDER — DEXAMETHASONE SODIUM PHOSPHATE 4 MG/ML
4 INJECTION, SOLUTION INTRA-ARTICULAR; INTRALESIONAL; INTRAMUSCULAR; INTRAVENOUS; SOFT TISSUE
Status: DISCONTINUED | OUTPATIENT
Start: 2017-11-08 | End: 2017-11-11 | Stop reason: HOSPADM

## 2017-11-08 RX ORDER — MAGNESIUM HYDROXIDE 1200 MG/15ML
LIQUID ORAL
Status: DISCONTINUED | OUTPATIENT
Start: 2017-11-08 | End: 2017-11-08 | Stop reason: HOSPADM

## 2017-11-08 RX ORDER — SODIUM CHLORIDE 9 MG/ML
INJECTION, SOLUTION INTRAVENOUS
Status: COMPLETED
Start: 2017-11-08 | End: 2017-11-08

## 2017-11-08 RX ADMIN — CLINDAMYCIN IN 5 PERCENT DEXTROSE 600 MG: 12 INJECTION, SOLUTION INTRAVENOUS at 10:06

## 2017-11-08 RX ADMIN — INSULIN LISPRO 3 UNITS: 100 INJECTION, SOLUTION INTRAVENOUS; SUBCUTANEOUS at 10:49

## 2017-11-08 RX ADMIN — INSULIN LISPRO 3 UNITS: 100 INJECTION, SOLUTION INTRAVENOUS; SUBCUTANEOUS at 05:48

## 2017-11-08 RX ADMIN — CEFAZOLIN SODIUM 2 G: 2 INJECTION, SOLUTION INTRAVENOUS at 22:30

## 2017-11-08 RX ADMIN — FENTANYL CITRATE 50 MCG: 50 INJECTION, SOLUTION INTRAMUSCULAR; INTRAVENOUS at 10:06

## 2017-11-08 RX ADMIN — FENTANYL CITRATE 50 MCG: 50 INJECTION, SOLUTION INTRAMUSCULAR; INTRAVENOUS at 08:41

## 2017-11-08 RX ADMIN — CLINDAMYCIN IN 5 PERCENT DEXTROSE 600 MG: 12 INJECTION, SOLUTION INTRAVENOUS at 17:22

## 2017-11-08 RX ADMIN — METOCLOPRAMIDE HYDROCHLORIDE 10 MG: 10 TABLET ORAL at 10:49

## 2017-11-08 RX ADMIN — ACETAMINOPHEN 650 MG: 325 TABLET, FILM COATED ORAL at 19:37

## 2017-11-08 RX ADMIN — INSULIN LISPRO 2 UNITS: 100 INJECTION, SOLUTION INTRAVENOUS; SUBCUTANEOUS at 17:24

## 2017-11-08 RX ADMIN — ACETAMINOPHEN 650 MG: 325 TABLET, FILM COATED ORAL at 18:57

## 2017-11-08 RX ADMIN — SODIUM CHLORIDE: 9 INJECTION, SOLUTION INTRAVENOUS at 00:30

## 2017-11-08 RX ADMIN — FENTANYL CITRATE 25 MCG: 50 INJECTION, SOLUTION INTRAMUSCULAR; INTRAVENOUS at 23:52

## 2017-11-08 RX ADMIN — METOPROLOL TARTRATE 5 MG: 5 INJECTION INTRAVENOUS at 19:15

## 2017-11-08 RX ADMIN — SODIUM CHLORIDE: 9 INJECTION, SOLUTION INTRAVENOUS at 10:49

## 2017-11-08 RX ADMIN — SODIUM CHLORIDE 2000 ML: 9 INJECTION, SOLUTION INTRAVENOUS at 20:30

## 2017-11-08 RX ADMIN — INSULIN GLARGINE 30 UNITS: 100 INJECTION, SOLUTION SUBCUTANEOUS at 08:41

## 2017-11-08 RX ADMIN — FENTANYL CITRATE 25 MCG: 50 INJECTION, SOLUTION INTRAMUSCULAR; INTRAVENOUS at 23:47

## 2017-11-08 RX ADMIN — FENTANYL CITRATE 100 MCG: 50 INJECTION, SOLUTION INTRAMUSCULAR; INTRAVENOUS at 12:07

## 2017-11-08 RX ADMIN — HEPARIN SODIUM 5000 UNITS: 5000 INJECTION, SOLUTION INTRAVENOUS; SUBCUTANEOUS at 05:47

## 2017-11-08 RX ADMIN — BENZOCAINE AND MENTHOL 1 LOZENGE: 15; 3.6 LOZENGE ORAL at 00:11

## 2017-11-08 RX ADMIN — ACETAMINOPHEN 650 MG: 325 TABLET, FILM COATED ORAL at 00:11

## 2017-11-08 RX ADMIN — FENTANYL CITRATE 100 MCG: 50 INJECTION, SOLUTION INTRAMUSCULAR; INTRAVENOUS at 17:22

## 2017-11-08 RX ADMIN — BENZOCAINE AND MENTHOL 1 LOZENGE: 15; 3.6 LOZENGE ORAL at 05:48

## 2017-11-08 RX ADMIN — CEFAZOLIN SODIUM 2 G: 2 INJECTION, SOLUTION INTRAVENOUS at 10:47

## 2017-11-08 RX ADMIN — METOPROLOL TARTRATE 2.5 MG: 5 INJECTION, SOLUTION INTRAVENOUS at 19:36

## 2017-11-08 RX ADMIN — METOCLOPRAMIDE HYDROCHLORIDE 10 MG: 10 TABLET ORAL at 08:41

## 2017-11-08 RX ADMIN — FENTANYL CITRATE 100 MCG: 50 INJECTION, SOLUTION INTRAMUSCULAR; INTRAVENOUS at 15:04

## 2017-11-08 ASSESSMENT — ENCOUNTER SYMPTOMS
ABDOMINAL PAIN: 0
PALPITATIONS: 0
COUGH: 0
CONSTITUTIONAL NEGATIVE: 1
CHILLS: 0
CONSTIPATION: 0
HEMOPTYSIS: 0
DIZZINESS: 0
DIARRHEA: 0
CARDIOVASCULAR NEGATIVE: 1
FOCAL WEAKNESS: 0
GASTROINTESTINAL NEGATIVE: 1
VOMITING: 0
BRUISES/BLEEDS EASILY: 0
NERVOUS/ANXIOUS: 0
LOSS OF CONSCIOUSNESS: 0
BLOOD IN STOOL: 0
NEUROLOGICAL NEGATIVE: 1
MUSCULOSKELETAL NEGATIVE: 1
FEVER: 0
DIAPHORESIS: 0
RESPIRATORY NEGATIVE: 1
NAUSEA: 0
SEIZURES: 0
HEADACHES: 0
EYES NEGATIVE: 1
WHEEZING: 0
DOUBLE VISION: 0
PSYCHIATRIC NEGATIVE: 1
HEARTBURN: 0

## 2017-11-08 ASSESSMENT — PAIN SCALES - GENERAL
PAINLEVEL_OUTOF10: 5
PAINLEVEL_OUTOF10: 8
PAINLEVEL_OUTOF10: 6
PAINLEVEL_OUTOF10: 9
PAINLEVEL_OUTOF10: 9
PAINLEVEL_OUTOF10: 6
PAINLEVEL_OUTOF10: 10
PAINLEVEL_OUTOF10: 9

## 2017-11-08 NOTE — ASSESSMENT & PLAN NOTE
Patient's early DKA at this point still persists her blood sugars at 305 she does not need an insulin drip yet. The patient's anion gap is 16 which has worsened from 11 and her CO2 levels low at 11. Electrolytes at this point have been monitored and are stable potassium is up at 4.2 and sodium is 133 the patient's blood sugars at this point are carefully monitored and she's been given subcutaneous insulin.

## 2017-11-08 NOTE — PROGRESS NOTES
Jerilyn Walton Fall Risk Assessment:     Last Known Fall: No falls  Mobility: No limitations  Medications: No meds  Mental Status/LOC/Awareness: Awake, alert, and oriented to date, place, and person  Toileting Needs: No needs  Volume/Electrolyte Status: Low blood sugar/electrolyte imbalances  Communication/Sensory: No deficits  Behavior: Appropriate behavior  Jerilyn Walton Fall Risk Total: 5  Fall Risk Level: NO RISK    Universal Fall Precautions:  call light/belongings in reach, bed in low position and locked, siderails up x 2    Fall Risk Level Interventions:          Patient Specific Interventions:     Medication: limit combination of prn medications  Mental Status/LOC/Awareness: check on patient hourly and reinforce the use of call light  Toileting: instruct male patients prone to dizziness to void while sitting  Volume/Electrolyte Status: monitor abnormal lab values  Communication/Sensory: ensure proper positioning when transferrng/ambulating  Behavioral: administer medication as ordered and instruct/reinforce fall program rationale  Mobility: utilize bed/chair fall alarm and ensure bed is locked and in lowest position

## 2017-11-08 NOTE — PROGRESS NOTES
Assumed pt care from day shift RN. Pt is A&Ox4, VSS, no acute distress noted, c/o throat swelling and pain. Bed is locked, in low position, call bell within reach. Assessment completed, meds administered. Will continue to monitor

## 2017-11-08 NOTE — PROGRESS NOTES
[facility] Hospitalist Progress Note    Date of Service: 11/8/2017    Chief Complaint  27 y.o. female admitted 11/7/2017 with left-sided neck swelling and pain.    Interval Problem Update  Mrs. Young is a 27-year-old female who was admitted by my partner last night please refer to his H&P for complete details. The patient apparently for the past 7-10 days has been having some tooth pain as well as left-sided jaw pain or past couple days the swelling has become extremely as. Patient is comes in now is unable to really swallow she has a lot of throat pain and at this point she's also developed some fevers. Initially the patient with her diabetes has progressed DKA. Patient displays aggressively managed with fluids her electrolytes are actively being corrected. Antibiotic choice at this point was revised as Levaquin does have a high frequency and younger females of Achilles rupture. This I switched her antibiotic to clindamycin and Ancef. I have spoken with oral surgery they will consult on the patient patient is to be kept nothing by mouth and they will be taking the patient to surgery as the Panoramic x-ray does show a molar abscess on the left most posterior molar.    Consultants/Specialty  Oral surgery    Disposition  To be determined        Review of Systems   Constitutional: Negative.  Negative for chills, diaphoresis and fever.   HENT: Negative.    Eyes: Negative.  Negative for double vision.   Respiratory: Negative.  Negative for cough, hemoptysis and wheezing.    Cardiovascular: Negative.  Negative for chest pain, palpitations and leg swelling.   Gastrointestinal: Negative.  Negative for abdominal pain, blood in stool, constipation, diarrhea, heartburn, nausea and vomiting.   Genitourinary: Negative.  Negative for frequency, hematuria and urgency.   Musculoskeletal: Negative.  Negative for joint pain.   Skin: Negative.  Negative for itching and rash.   Neurological: Negative.  Negative for dizziness, focal  weakness, seizures, loss of consciousness and headaches.   Endo/Heme/Allergies: Negative.  Does not bruise/bleed easily.   Psychiatric/Behavioral: Negative.  Negative for suicidal ideas. The patient is not nervous/anxious.       Physical Exam  Laboratory/Imaging   Hemodynamics  Temp (24hrs), Av.3 °C (99.1 °F), Min:36.4 °C (97.6 °F), Max:37.8 °C (100.1 °F)   Temperature: 37.8 °C (100 °F)  Pulse  Av.4  Min: 98  Max: 157 Heart Rate (Monitored): (!) 122  Blood Pressure: 130/83, NIBP: 127/83      Respiratory      Respiration: 18, Pulse Oximetry: 94 %        RUL Breath Sounds: Clear, NANCY Breath Sounds: Clear    Fluids  No intake or output data in the 24 hours ending 17 1348    Nutrition  Orders Placed This Encounter   Procedures   • DIET NPO     Standing Status:   Standing     Number of Occurrences:   1     Order Specific Question:   Restrict to:     Answer:   Sips with Medications [3]     Physical Exam   Constitutional: She is oriented to person, place, and time. She appears well-developed and well-nourished.   HENT:   Head: Normocephalic and atraumatic.   Right Ear: External ear normal.   Left Ear: External ear normal.   Nose: Nose normal.   Mouth/Throat: Oropharynx is clear and moist.   Eyes: Conjunctivae and EOM are normal. Pupils are equal, round, and reactive to light.   Neck: No JVD present. Tracheal tenderness present. Neck rigidity present. Edema and decreased range of motion present. No thyromegaly present.   Cardiovascular: Normal rate and regular rhythm.    No murmur heard.  Pulmonary/Chest: Effort normal and breath sounds normal. Stridor present. She has no wheezes. She has no rales. She exhibits no tenderness.   Abdominal: She exhibits no distension and no mass. There is no tenderness. There is no rebound and no guarding.   Genitourinary:   Genitourinary Comments: Pitt catheter   Musculoskeletal: She exhibits no edema or tenderness.   Lymphadenopathy:     She has no cervical adenopathy.    Neurological: She is alert and oriented to person, place, and time. She has normal reflexes. No cranial nerve deficit.   Skin: Skin is warm and dry. No rash noted. No erythema.   Psychiatric: She has a normal mood and affect. Her behavior is normal. Judgment and thought content normal.   Nursing note and vitals reviewed.      Recent Labs      11/07/17   1028  11/07/17   1253  11/08/17   0007   WBC  9.4  7.7  6.0   RBC  4.98  4.07*  4.14*   HEMOGLOBIN  14.7  11.7*  11.7*   HEMATOCRIT  42.7  35.4*  36.1*   MCV  85.7  86.7  87.2   MCH  29.5  28.7  28.3   MCHC  34.4  33.1*  32.4*   RDW  40.6  40.9  41.1   PLATELETCT  208  207  186   MPV  10.3  10.2  9.9     Recent Labs      11/08/17   0007  11/08/17   0424  11/08/17   0818   SODIUM  136  134*  135   POTASSIUM  3.9  4.2  4.2   CHLORIDE  107  106  107   CO2  20  17*  17*   GLUCOSE  190*  275*  266*   BUN  14  12  10   CREATININE  0.38*  0.37*  0.38*   CALCIUM  8.2*  8.1*  8.2*                      Assessment/Plan     * Hyperglycemia due to type 1 diabetes mellitus (CMS-HCC)- (present on admission)   Assessment & Plan    Patient has early DKA secondary to a mandibular abscess.  White blood cell count at this point has stabilized.  Electrolytes at this point have stabilized.  Anion gap has closed.  Patient's CO2 remains at 17.  For now patient is requiring pain management for her nausea and abdominal pain.        Mandibular abscess- (present on admission)   Assessment & Plan    Patient is placed on clindamycin along with Ancef.  Panoramic x-ray shows a left mandibular abscess at the most posterior molar.  Oral surgery has been consult to Dr. Young will be seeing the patient and taking the patient to surgery for evacuation of the abscess patient will be nothing by mouth.         Gastroparesis due to DM (CMS-HCC)- (present on admission)   Assessment & Plan    Continue to monitor at this point for nausea vomiting, continue with antiemetics.  Continue with pain management  patient at this point is pretty miserable we are increasing her fentanyl 5200 µg every hour when necessary.        Sore throat- (present on admission)   Assessment & Plan    Secondary to mandibular abscess.  Continue at this point with throat lozenges as necessary.        GERD (gastroesophageal reflux disease)- (present on admission)   Assessment & Plan    If necessary use PPI, continue to monitor.            Reviewed items::  EKG reviewed, Labs reviewed, Medications reviewed and Radiology images reviewed  Pitt catheter::  No Pitt  DVT prophylaxis pharmacological::  Heparin  Ulcer Prophylaxis::  Yes  Antibiotics:  Treating active infection/contamination beyond 24 hours perioperative coverage

## 2017-11-08 NOTE — ASSESSMENT & PLAN NOTE
Patient's oral cavity at this point had multiple cavities as well as abscesses around the molar teeth in the left mandibular area. The patient underwent surgical resection of these teeth and a clean out of the abscess by oral surgery last night with extraction of teeth numbers 1, 3, 4, 5, 12, 13, 16, and 18.and extraoral incision and drainage of the left sublingual space abscess.  Continue at this point with antibiotic management pain management and monitoring of current situation.

## 2017-11-08 NOTE — PROGRESS NOTES
Received patient from ED, pt c/o 8/10 throat pain. A&O, VSS. Called Dr. Luis for updated orders and pain medication.

## 2017-11-08 NOTE — H&P
HOSPITAL MEDICINE HISTORY/ PHYSICAL    Date of Service:  11/7/2017   5:31 PM       Patient ID:   Name: Alis Sparks. YOB: 1990. Age: 27 y.o. female. MRN: [id number]    Admitting Attending:  Ruddy Luis     PCP : Navi Huber M.D.          Chief Complaint:       Sore throat and dehydration    History of Present Illness:    Bridger is a 27 y.o. female w/h/o Dm type 1 and recurrent DKA who presents with above CC. Patient states that she has been having several days of worsening sore throat and inability to swllow well and eat food. She also has felt dehydrated and felt some slight abdominal pain. Her swelling continues to get worse. The only things she tried at home were APAP and drinking hot tea, which seemed to help for a while and then got worse again recently. Denies any fevers at home. Claims her blood sugars were in the 200's at home. Does not feel the classic symptoms of DKA at this time. She does endorse some nausea, but no vomiting. Denies any change in urination recently either. Denies any issues with breathing and denies any sick contacts.   She was able to eat food last night without difficulty.    Review of Systems:    Has Review of Systems   Constitutional: Negative for chills and fever.   HENT: Positive for sore throat.    Eyes: Negative for blurred vision.   Respiratory: Negative for shortness of breath.    Cardiovascular: Negative for chest pain and leg swelling.   Gastrointestinal: Positive for abdominal pain and nausea. Negative for heartburn and vomiting.   Genitourinary: Negative for dysuria and urgency.   Musculoskeletal: Negative for myalgias and neck pain.   Skin: Negative for itching.   Neurological: Positive for weakness. Negative for dizziness, focal weakness, loss of consciousness and headaches.   Psychiatric/Behavioral: Negative for depression.   All other systems reviewed and are negative.    Please see HPI, all other systems were reviewed and are  negative (AMA/CMS criteria)              Past Medical/ Family / Social history (PFSH):   Past Medical History:   Diagnosis Date   • Backpain    • Bronchitis    • Diabetes type 1, controlled (CMS-HCC)     tests 4-5 times daily   • DKA (diabetic ketoacidoses) (CMS-HCC)    • Fall    • Gastroparesis    • Kidney infection    • Pneumonia    • UTI (urinary tract infection)      Past Surgical History:   Procedure Laterality Date   • GASTROSCOPY-ENDO  9/18/2014    Performed by Sylvain PERRY M.D. at ENDOSCOPY [facility]   • GASTROSCOPY WITH BIOPSY  9/18/2014    Performed by Sylvain PERRY M.D. at ENDOSCOPY [facility]   • OTHER      surgery to patch lung after pneumor from central line placement     Current Outpatient Medications:  No current facility-administered medications on file prior to encounter.      Current Outpatient Prescriptions on File Prior to Encounter   Medication Sig Dispense Refill   • insulin aspart (NOVOLOG) 100 UNIT/ML Solution Inject 1-3 Units as instructed 3 times a day before meals. Sliding scale insulin   150 = 1 unit  151 - 200 = 2 units  201 - 250 = 3 units     • atorvastatin (LIPITOR) 20 MG Tab Take 1 Tab by mouth every day. 30 Tab 0   • insulin glargine (LANTUS) 100 UNIT/ML Solution Inject 30 Units as instructed 2 times a day. 10 mL 2     Medication Allergy/Sensitivities:  Allergies   Allergen Reactions   • Pcn [Penicillins] Shortness of Breath and Swelling     Per patient's Mom, patient tolerates Keflex   • Lisinopril Unspecified     Per historical: Reported pedal swelling. No facial/angioedema or rash nor respiratory symptoms.    • Promethazine Vomiting     Family History:  Family History   Problem Relation Age of Onset   • Cancer       breasts, multiple family members      Social History:  Social History   Substance Use Topics   • Smoking status: Never Smoker   • Smokeless tobacco: Never Used   • Alcohol use No  "    #################################################################  Physical Exam:   Vitals/ General Appearance:   Weight/BMI: Body mass index is 28.18 kg/m².  Blood pressure 134/79, pulse (!) 117, temperature 37.4 °C (99.3 °F), resp. rate 16, height 1.651 m (5' 5\"), weight 76.8 kg (169 lb 5 oz), last menstrual period 10/20/2017, SpO2 93 %.   Vitals:    11/07/17 1430 11/07/17 1500 11/07/17 1530 11/07/17 1608   BP:    134/79   Pulse: (!) 119 (!) 120 (!) 122 (!) 117   Resp:    16   Temp:    37.4 °C (99.3 °F)   SpO2: 92% 91% 91% 93%   Weight:       Height:        Oxygen Therapy:  Pulse Oximetry: 93 %, O2 (LPM): 0, O2 Delivery: None (Room Air)    Constitutional:  well developed, well nourished, non-toxic, no acute distress  HENMT: Normocephalic, atraumatic, b/l ears normal, nose normal, dry MM  Eyes:  EOMI, conjunctiva normal, no discharge  Neck: no tracheal deviation, supple, swelling and induration of the L submandibular gland and possible LAD in the cervical region on the L side, could not get good visualization of the posterior OP given inability to open mouth  Cardiovascular: normal heart rate, normal rhythm, no murmurs, no rubs or gallops; no cyanosis, clubbing or edema  Lungs: Respiratory effort is normal, normal breath sounds, breath sounds clear to auscultation b/l, no rales, rhonchi or wheezing  Abdomen: soft, mild tenderness in the epigastric region, no guarding or rebound  Skin: warm, dry, no erythema, no rash  Neurologic: Alert and oriented, strength 5/5 throughout, no focal deficits, CN II-XII normal  Psychiatric: No anxiety or depression    #################################################################  Lab Data Review:    Objective   Recent Results (from the past 24 hour(s))   EKG (ER)    Collection Time: 11/07/17  8:38 AM   Result Value Ref Range    Report       [facility]    Test Date:  2017-11-07  Pt Name:    AMANDA BRANCH            Department: ER  MRN:   "      [id number]                      Room:  Gender:     F                            Technician: [id number]  :        1990                   Requested By:ER TRIAGE PROTOCOL  Order #:    [id number]                    Reading MD:    Measurements  Intervals                                Axis  Rate:       145                          P:          69  WI:         136                          QRS:        -2  QRSD:       62                           T:          44  QT:         244  QTc:        379    Interpretive Statements  SINUS TACHYCARDIA  Compared to ECG 2017 10:39:15  No significant changes     ACCU-CHEK GLUCOSE    Collection Time: 17  8:46 AM   Result Value Ref Range    Glucose - Accu-Ck 397 (H) 65 - 99 mg/dL   POC UA    Collection Time: 17 10:26 AM   Result Value Ref Range    POC Color Yellow     POC Appearance Clear     POC Glucose 500 (A) Negative mg/dL    POC Ketones 80 (A) Negative mg/dL    POC Specific Gravity 1.010 1.005 - 1.030    POC Blood Negative Negative    POC Urine PH 5.5 5.0 - 8.0    POC Protein 30 (A) Negative mg/dL    POC Nitrites Negative Negative    POC Leukocyte Esterase Negative Negative   POC URINE PREGNANCY    Collection Time: 17 10:27 AM   Result Value Ref Range    POC Urine Pregnancy Test Negative Negative   RAPID STREP, CULT IF INDICATED (CULTURE IF NEGATIVE)    Collection Time: 17 10:28 AM   Result Value Ref Range    Significant Indicator NEG     Source THRT     Site THROAT     Rapid Strep Screen       Negative for Group A streptococcus.  A negative result may be obtained if the specimen is  inadequate or antigen concentration is below the  sensitivity of the test. This negative test will be followed  up with a culture as requested.     CBC WITH DIFFERENTIAL    Collection Time: 17 10:28 AM   Result Value Ref Range    WBC 9.4 4.8 - 10.8 K/uL    RBC 4.98 4.20 - 5.40 M/uL    Hemoglobin 14.7 12.0 - 16.0 g/dL    Hematocrit 42.7 37.0 - 47.0 %    MCV 85.7 81.4  - 97.8 fL    MCH 29.5 27.0 - 33.0 pg    MCHC 34.4 33.6 - 35.0 g/dL    RDW 40.6 35.9 - 50.0 fL    Platelet Count 208 164 - 446 K/uL    MPV 10.3 9.0 - 12.9 fL    Neutrophils-Polys 84.40 (H) 44.00 - 72.00 %    Lymphocytes 8.00 (L) 22.00 - 41.00 %    Monocytes 6.70 0.00 - 13.40 %    Eosinophils 0.20 0.00 - 6.90 %    Basophils 0.30 0.00 - 1.80 %    Immature Granulocytes 0.40 0.00 - 0.90 %    Nucleated RBC 0.00 /100 WBC    Lymphs (Absolute) 0.75 (L) 1.00 - 4.80 K/uL    Monos (Absolute) 0.63 0.00 - 0.85 K/uL    Eos (Absolute) 0.02 0.00 - 0.51 K/uL    Baso (Absolute) 0.03 0.00 - 0.12 K/uL    Immature Granulocytes (abs) 0.04 0.00 - 0.11 K/uL    NRBC (Absolute) 0.00 K/uL    Neutrophils (Absolute) 7.89 (H) 2.00 - 7.15 K/uL   COMP METABOLIC PANEL    Collection Time: 11/07/17 10:28 AM   Result Value Ref Range    Sodium 136 135 - 145 mmol/L    Potassium 4.4 3.6 - 5.5 mmol/L    Chloride 100 96 - 112 mmol/L    Co2 24 20 - 33 mmol/L    Anion Gap 12.0 (H) 0.0 - 11.9    Glucose 290 (H) 65 - 99 mg/dL    Bun 14 8 - 22 mg/dL    Creatinine 0.67 0.50 - 1.40 mg/dL    Calcium 9.8 8.5 - 10.5 mg/dL    AST(SGOT) 14 12 - 45 U/L    ALT(SGPT) 12 2 - 50 U/L    Alkaline Phosphatase 147 (H) 30 - 99 U/L    Total Bilirubin 0.6 0.1 - 1.5 mg/dL    Albumin 4.3 3.2 - 4.9 g/dL    Total Protein 7.4 6.0 - 8.2 g/dL    Globulin 3.1 1.9 - 3.5 g/dL    A-G Ratio 1.4 g/dL   LIPASE    Collection Time: 11/07/17 10:28 AM   Result Value Ref Range    Lipase 6 (L) 11 - 82 U/L   MONONUCLEOSIS TEST QUAL    Collection Time: 11/07/17 10:28 AM   Result Value Ref Range    Heterophile Screen Negative Negative   BETA-HYDROXYBUTYRIC ACID    Collection Time: 11/07/17 10:28 AM   Result Value Ref Range    beta-Hydroxybutyric Acid 0.42 (H) 0.02 - 0.27 mmol/L   ESTIMATED GFR    Collection Time: 11/07/17 10:28 AM   Result Value Ref Range    GFR If African American >60 >60 mL/min/1.73 m 2    GFR If Non African American >60 >60 mL/min/1.73 m 2   PERIPHERAL SMEAR REVIEW    Collection  Time: 11/07/17 10:28 AM   Result Value Ref Range    Peripheral Smear Review see below    DIFFERENTIAL COMMENT    Collection Time: 11/07/17 10:28 AM   Result Value Ref Range    Comments-Diff see below    CBC with Differential    Collection Time: 11/07/17 12:53 PM   Result Value Ref Range    WBC 7.7 4.8 - 10.8 K/uL    RBC 4.07 (L) 4.20 - 5.40 M/uL    Hemoglobin 11.7 (L) 12.0 - 16.0 g/dL    Hematocrit 35.4 (L) 37.0 - 47.0 %    MCV 86.7 81.4 - 97.8 fL    MCH 28.7 27.0 - 33.0 pg    MCHC 33.1 (L) 33.6 - 35.0 g/dL    RDW 40.9 35.9 - 50.0 fL    Platelet Count 207 164 - 446 K/uL    MPV 10.2 9.0 - 12.9 fL    Neutrophils-Polys 79.00 (H) 44.00 - 72.00 %    Lymphocytes 12.20 (L) 22.00 - 41.00 %    Monocytes 7.90 0.00 - 13.40 %    Eosinophils 0.10 0.00 - 6.90 %    Basophils 0.40 0.00 - 1.80 %    Immature Granulocytes 0.40 0.00 - 0.90 %    Nucleated RBC 0.00 /100 WBC    Neutrophils (Absolute) 6.09 2.00 - 7.15 K/uL    Lymphs (Absolute) 0.94 (L) 1.00 - 4.80 K/uL    Monos (Absolute) 0.61 0.00 - 0.85 K/uL    Eos (Absolute) 0.01 0.00 - 0.51 K/uL    Baso (Absolute) 0.03 0.00 - 0.12 K/uL    Immature Granulocytes (abs) 0.03 0.00 - 0.11 K/uL    NRBC (Absolute) 0.00 K/uL   Comp Metabolic Panel with Phosphorus, Magnesium    Collection Time: 11/07/17 12:53 PM   Result Value Ref Range    Sodium 135 135 - 145 mmol/L    Potassium 4.1 3.6 - 5.5 mmol/L    Chloride 104 96 - 112 mmol/L    Co2 24 20 - 33 mmol/L    Anion Gap 7.0 0.0 - 11.9    Glucose 292 (H) 65 - 99 mg/dL    Bun 15 8 - 22 mg/dL    Creatinine 0.58 0.50 - 1.40 mg/dL    Calcium 8.4 (L) 8.5 - 10.5 mg/dL    AST(SGOT) 14 12 - 45 U/L    ALT(SGPT) 10 2 - 50 U/L    Alkaline Phosphatase 107 (H) 30 - 99 U/L    Total Bilirubin 0.5 0.1 - 1.5 mg/dL    Albumin 3.2 3.2 - 4.9 g/dL    Total Protein 5.8 (L) 6.0 - 8.2 g/dL    Globulin 2.6 1.9 - 3.5 g/dL    A-G Ratio 1.2 g/dL   ESTIMATED GFR    Collection Time: 11/07/17 12:53 PM   Result Value Ref Range    GFR If African American >60 >60 mL/min/1.73 m 2     GFR If Non African American >60 >60 mL/min/1.73 m 2   ACCU-CHEK GLUCOSE    Collection Time: 11/07/17  4:53 PM   Result Value Ref Range    Glucose - Accu-Ck 358 (H) 65 - 99 mg/dL         Imaging/Procedures Review:    CT-MAXILLOFACIAL WITH PLUS RECONS   Final Result      Left parapharyngeal soft tissue and adenoidal soft tissue swelling consistent with inflammatory changes. No abscess identified. No significant compromise of the upper airway.   Upper cervical/submandibular/submental enhancing subcentimeter short axis lymph nodes likely reactive.   Paranasal sinus inflammatory changes especially the right maxillary sinus.   Dental disease.        EKG:   None performed    Assessment and Plan:      * Hyperglycemia due to type 1 diabetes mellitus (CMS-HCC)- (present on admission)   Assessment & Plan    -elevated in the ER, very early DKA that already resolved with IVF's  -continue outpatient lantus and iSS, adjust PRN, monitor blood sugars closely  -BMP Q4 hours throughout the night        Gastroparesis due to DM (CMS-[facility])- (present on admission)   Assessment & Plan    -near her baseline  -advance diet slowly  -outpatient reglan  -control bs's closely        Sore throat- (present on admission)   Assessment & Plan    -CT maxillofacial shows some inflammatory changes in the adenoids L parapharyngeal soft tissue, but no e/o abscess nor airway compromise  -IV levaquin given PCN allergy  -consider steroids, but will avoid given elevated blood sugars at this time  -pain control  -F/U strep throat swab        GERD (gastroesophageal reflux disease)- (present on admission)   Assessment & Plan    -outpatient meds, monitor closely              1. Prophylaxis: sc heparin  2. Code: Full code per patient with herself present  3. Dispo: She will be admitted to inpatient for management that is expected to take greater than 2 midnights

## 2017-11-08 NOTE — DIETARY
Nutrition Services:  Poor PO on admit screen    Pt is a 27 yr old female admitted with DKA (diabetic ketoacidoses), Sore throat    Past Medical History:  UTI, Pneumonia, Kidney infection, Gastroparesis, Fall, DKA, Type 1 Diabetes, controlled, Bronchitis, Backpain.    Spoke with pt at bedside.  At the time, pt was on a Diabetic diet.  Pt currently NPO for a procedure.  Pt states that she has had poor appetite PTA d/t jaw pain and a sore throat.  Pt states she feels hungry but that it is painful to eat.  Obtained softer meal preferences from pt and added to daily menu.  Pt denies any recent wt loss and states that her UBW is ~ 75 kg (165 lbs).  Current wt is 76.8 kg (169 lbs).      Diet:  NPO  Wt:  76.8 kg (169 lbs) - stand up scale wt 11/7  BMI:  28.18  Pertinent Labs:  Sodium 134, Glucose 237, HbA1c 14.2 (9/20)  Pertinent Medications:  Ancef, Clindamycin, Heparin, Lantus, SSI, Reglan, Pericolace, Bowel meds PRN  Fluids:   mL/hr  Skin:  No skin breakdown  GI:  Last BM 11/6    Plan/Recommend:  Advance diet as tolerated by pt per SLP/MD.  Record percentage of meals in ADL flowsheet to ensure adequate PO intake.  Nutrition Representative to see pt for meals daily.  RD to monitor wt and lab trends.    RD following.

## 2017-11-08 NOTE — THERAPY
Orders received for a cognitive-linguistic evaluation.  Clarified order with MD who stated cog ordered in error.  Orders for swallow evaluation received but hold today as the patient is NPO due to an abscess.

## 2017-11-08 NOTE — PROGRESS NOTES
Received report and met with patient at bedside. Discussed plan of care for the day. Pt c/o 7/10 throat pain with increase in swelling, unrelieved by current pain regimen. Denies any other needs at this time. Bed locked & in lowest position, call bell and side table within reach.

## 2017-11-09 LAB
ALBUMIN SERPL BCP-MCNC: 3 G/DL (ref 3.2–4.9)
ALBUMIN/GLOB SERPL: 1.2 G/DL
ALP SERPL-CCNC: 94 U/L (ref 30–99)
ALT SERPL-CCNC: 6 U/L (ref 2–50)
ANION GAP SERPL CALC-SCNC: 16 MMOL/L (ref 0–11.9)
ANISOCYTOSIS BLD QL SMEAR: ABNORMAL
AST SERPL-CCNC: 10 U/L (ref 12–45)
BASOPHILS # BLD AUTO: 0.9 % (ref 0–1.8)
BASOPHILS # BLD: 0.05 K/UL (ref 0–0.12)
BILIRUB SERPL-MCNC: 0.4 MG/DL (ref 0.1–1.5)
BUN SERPL-MCNC: 9 MG/DL (ref 8–22)
CALCIUM SERPL-MCNC: 7.7 MG/DL (ref 8.5–10.5)
CHLORIDE SERPL-SCNC: 106 MMOL/L (ref 96–112)
CO2 SERPL-SCNC: 11 MMOL/L (ref 20–33)
CREAT SERPL-MCNC: 0.55 MG/DL (ref 0.5–1.4)
EKG IMPRESSION: NORMAL
EOSINOPHIL # BLD AUTO: 0.15 K/UL (ref 0–0.51)
EOSINOPHIL NFR BLD: 2.6 % (ref 0–6.9)
ERYTHROCYTE [DISTWIDTH] IN BLOOD BY AUTOMATED COUNT: 44.1 FL (ref 35.9–50)
GFR SERPL CREATININE-BSD FRML MDRD: >60 ML/MIN/1.73 M 2
GLOBULIN SER CALC-MCNC: 2.5 G/DL (ref 1.9–3.5)
GLUCOSE BLD-MCNC: 218 MG/DL (ref 65–99)
GLUCOSE BLD-MCNC: 225 MG/DL (ref 65–99)
GLUCOSE BLD-MCNC: 246 MG/DL (ref 65–99)
GLUCOSE BLD-MCNC: 292 MG/DL (ref 65–99)
GLUCOSE SERPL-MCNC: 305 MG/DL (ref 65–99)
GRAM STN SPEC: NORMAL
HCT VFR BLD AUTO: 37.6 % (ref 37–47)
HGB BLD-MCNC: 11.7 G/DL (ref 12–16)
LACTATE BLD-SCNC: 1.7 MMOL/L (ref 0.5–2)
LYMPHOCYTES # BLD AUTO: 1.02 K/UL (ref 1–4.8)
LYMPHOCYTES NFR BLD: 18.3 % (ref 22–41)
MANUAL DIFF BLD: NORMAL
MCH RBC QN AUTO: 28.8 PG (ref 27–33)
MCHC RBC AUTO-ENTMCNC: 31.1 G/DL (ref 33.6–35)
MCV RBC AUTO: 92.6 FL (ref 81.4–97.8)
MICROCYTES BLD QL SMEAR: ABNORMAL
MONOCYTES # BLD AUTO: 0.1 K/UL (ref 0–0.85)
MONOCYTES NFR BLD AUTO: 1.7 % (ref 0–13.4)
MORPHOLOGY BLD-IMP: NORMAL
NEUTROPHILS # BLD AUTO: 4.28 K/UL (ref 2–7.15)
NEUTROPHILS NFR BLD: 68.7 % (ref 44–72)
NEUTS BAND NFR BLD MANUAL: 7.8 % (ref 0–10)
NRBC # BLD AUTO: 0 K/UL
NRBC BLD AUTO-RTO: 0 /100 WBC
PLATELET # BLD AUTO: 149 K/UL (ref 164–446)
PLATELET BLD QL SMEAR: NORMAL
PMV BLD AUTO: 9.7 FL (ref 9–12.9)
POIKILOCYTOSIS BLD QL SMEAR: NORMAL
POTASSIUM SERPL-SCNC: 4.2 MMOL/L (ref 3.6–5.5)
PROT SERPL-MCNC: 5.5 G/DL (ref 6–8.2)
RBC # BLD AUTO: 4.06 M/UL (ref 4.2–5.4)
RBC BLD AUTO: PRESENT
S PYO SPEC QL CULT: NORMAL
SIGNIFICANT IND 70042: NORMAL
SIGNIFICANT IND 70042: NORMAL
SITE SITE: NORMAL
SITE SITE: NORMAL
SODIUM SERPL-SCNC: 133 MMOL/L (ref 135–145)
SOURCE SOURCE: NORMAL
SOURCE SOURCE: NORMAL
SPHEROCYTES BLD QL SMEAR: NORMAL
WBC # BLD AUTO: 5.6 K/UL (ref 4.8–10.8)

## 2017-11-09 PROCEDURE — 85027 COMPLETE CBC AUTOMATED: CPT

## 2017-11-09 PROCEDURE — 700101 HCHG RX REV CODE 250: Performed by: FAMILY MEDICINE

## 2017-11-09 PROCEDURE — 700101 HCHG RX REV CODE 250: Performed by: HOSPITALIST

## 2017-11-09 PROCEDURE — 85007 BL SMEAR W/DIFF WBC COUNT: CPT

## 2017-11-09 PROCEDURE — 700111 HCHG RX REV CODE 636 W/ 250 OVERRIDE (IP): Performed by: INTERNAL MEDICINE

## 2017-11-09 PROCEDURE — 36415 COLL VENOUS BLD VENIPUNCTURE: CPT

## 2017-11-09 PROCEDURE — 83605 ASSAY OF LACTIC ACID: CPT

## 2017-11-09 PROCEDURE — 770020 HCHG ROOM/CARE - TELE (206)

## 2017-11-09 PROCEDURE — 99233 SBSQ HOSP IP/OBS HIGH 50: CPT | Performed by: HOSPITALIST

## 2017-11-09 PROCEDURE — 80053 COMPREHEN METABOLIC PANEL: CPT

## 2017-11-09 PROCEDURE — 700102 HCHG RX REV CODE 250 W/ 637 OVERRIDE(OP): Performed by: INTERNAL MEDICINE

## 2017-11-09 PROCEDURE — A9270 NON-COVERED ITEM OR SERVICE: HCPCS | Performed by: INTERNAL MEDICINE

## 2017-11-09 PROCEDURE — 700105 HCHG RX REV CODE 258: Performed by: HOSPITALIST

## 2017-11-09 PROCEDURE — 700111 HCHG RX REV CODE 636 W/ 250 OVERRIDE (IP): Performed by: ORAL & MAXILLOFACIAL SURGERY

## 2017-11-09 PROCEDURE — 700111 HCHG RX REV CODE 636 W/ 250 OVERRIDE (IP): Performed by: HOSPITALIST

## 2017-11-09 PROCEDURE — 82962 GLUCOSE BLOOD TEST: CPT | Mod: 91

## 2017-11-09 RX ORDER — HYDROMORPHONE HYDROCHLORIDE 2 MG/1
.5-1 TABLET ORAL
Status: DISCONTINUED | OUTPATIENT
Start: 2017-11-09 | End: 2017-11-09

## 2017-11-09 RX ORDER — OXYCODONE HYDROCHLORIDE AND ACETAMINOPHEN 5; 325 MG/1; MG/1
1-2 TABLET ORAL EVERY 4 HOURS PRN
Status: DISCONTINUED | OUTPATIENT
Start: 2017-11-09 | End: 2017-11-11 | Stop reason: HOSPADM

## 2017-11-09 RX ORDER — METOPROLOL TARTRATE 1 MG/ML
5 INJECTION, SOLUTION INTRAVENOUS ONCE
Status: COMPLETED | OUTPATIENT
Start: 2017-11-09 | End: 2017-11-09

## 2017-11-09 RX ADMIN — ONDANSETRON 4 MG: 2 INJECTION INTRAMUSCULAR; INTRAVENOUS at 03:00

## 2017-11-09 RX ADMIN — PROCHLORPERAZINE EDISYLATE 10 MG: 5 INJECTION INTRAMUSCULAR; INTRAVENOUS at 20:52

## 2017-11-09 RX ADMIN — INSULIN LISPRO 2 UNITS: 100 INJECTION, SOLUTION INTRAVENOUS; SUBCUTANEOUS at 21:12

## 2017-11-09 RX ADMIN — HEPARIN SODIUM 5000 UNITS: 5000 INJECTION, SOLUTION INTRAVENOUS; SUBCUTANEOUS at 06:01

## 2017-11-09 RX ADMIN — SODIUM CHLORIDE: 9 INJECTION, SOLUTION INTRAVENOUS at 16:38

## 2017-11-09 RX ADMIN — ATORVASTATIN CALCIUM 20 MG: 20 TABLET, FILM COATED ORAL at 21:07

## 2017-11-09 RX ADMIN — SODIUM CHLORIDE: 9 INJECTION, SOLUTION INTRAVENOUS at 09:06

## 2017-11-09 RX ADMIN — METOCLOPRAMIDE HYDROCHLORIDE 10 MG: 10 TABLET ORAL at 16:39

## 2017-11-09 RX ADMIN — STANDARDIZED SENNA CONCENTRATE AND DOCUSATE SODIUM 2 TABLET: 8.6; 5 TABLET, FILM COATED ORAL at 21:07

## 2017-11-09 RX ADMIN — CLINDAMYCIN IN 5 PERCENT DEXTROSE 600 MG: 12 INJECTION, SOLUTION INTRAVENOUS at 03:29

## 2017-11-09 RX ADMIN — INSULIN GLARGINE 30 UNITS: 100 INJECTION, SOLUTION SUBCUTANEOUS at 09:04

## 2017-11-09 RX ADMIN — CEFAZOLIN SODIUM 2 G: 2 INJECTION, SOLUTION INTRAVENOUS at 13:37

## 2017-11-09 RX ADMIN — HEPARIN SODIUM 5000 UNITS: 5000 INJECTION, SOLUTION INTRAVENOUS; SUBCUTANEOUS at 20:52

## 2017-11-09 RX ADMIN — METOPROLOL TARTRATE 5 MG: 1 INJECTION, SOLUTION INTRAVENOUS at 21:24

## 2017-11-09 RX ADMIN — FENTANYL CITRATE 50 MCG: 50 INJECTION, SOLUTION INTRAMUSCULAR; INTRAVENOUS at 00:30

## 2017-11-09 RX ADMIN — HEPARIN SODIUM 5000 UNITS: 5000 INJECTION, SOLUTION INTRAVENOUS; SUBCUTANEOUS at 13:37

## 2017-11-09 RX ADMIN — HYDROMORPHONE HYDROCHLORIDE 1 MG: 1 INJECTION, SOLUTION INTRAMUSCULAR; INTRAVENOUS; SUBCUTANEOUS at 13:50

## 2017-11-09 RX ADMIN — CEFAZOLIN SODIUM 2 G: 2 INJECTION, SOLUTION INTRAVENOUS at 23:44

## 2017-11-09 RX ADMIN — HYDROMORPHONE HYDROCHLORIDE 1 MG: 1 INJECTION, SOLUTION INTRAMUSCULAR; INTRAVENOUS; SUBCUTANEOUS at 02:43

## 2017-11-09 RX ADMIN — INSULIN GLARGINE 30 UNITS: 100 INJECTION, SOLUTION SUBCUTANEOUS at 21:08

## 2017-11-09 RX ADMIN — HYDROMORPHONE HYDROCHLORIDE 1 MG: 1 INJECTION, SOLUTION INTRAMUSCULAR; INTRAVENOUS; SUBCUTANEOUS at 05:46

## 2017-11-09 RX ADMIN — CLINDAMYCIN IN 5 PERCENT DEXTROSE 600 MG: 12 INJECTION, SOLUTION INTRAVENOUS at 11:08

## 2017-11-09 RX ADMIN — HYDROMORPHONE HYDROCHLORIDE 1 MG: 1 INJECTION, SOLUTION INTRAMUSCULAR; INTRAVENOUS; SUBCUTANEOUS at 16:35

## 2017-11-09 RX ADMIN — HYDROMORPHONE HYDROCHLORIDE 1 MG: 1 INJECTION, SOLUTION INTRAMUSCULAR; INTRAVENOUS; SUBCUTANEOUS at 20:53

## 2017-11-09 RX ADMIN — CLINDAMYCIN IN 5 PERCENT DEXTROSE 600 MG: 12 INJECTION, SOLUTION INTRAVENOUS at 21:02

## 2017-11-09 RX ADMIN — INSULIN LISPRO 2 UNITS: 100 INJECTION, SOLUTION INTRAVENOUS; SUBCUTANEOUS at 16:45

## 2017-11-09 RX ADMIN — PROCHLORPERAZINE EDISYLATE 10 MG: 5 INJECTION INTRAMUSCULAR; INTRAVENOUS at 05:45

## 2017-11-09 RX ADMIN — INSULIN LISPRO 2 UNITS: 100 INJECTION, SOLUTION INTRAVENOUS; SUBCUTANEOUS at 11:15

## 2017-11-09 RX ADMIN — CEFAZOLIN SODIUM 2 G: 2 INJECTION, SOLUTION INTRAVENOUS at 06:09

## 2017-11-09 RX ADMIN — HYDROMORPHONE HYDROCHLORIDE 1 MG: 1 INJECTION, SOLUTION INTRAMUSCULAR; INTRAVENOUS; SUBCUTANEOUS at 08:55

## 2017-11-09 RX ADMIN — INSULIN LISPRO 3 UNITS: 100 INJECTION, SOLUTION INTRAVENOUS; SUBCUTANEOUS at 06:00

## 2017-11-09 ASSESSMENT — PAIN SCALES - GENERAL
PAINLEVEL_OUTOF10: 6
PAINLEVEL_OUTOF10: 8
PAINLEVEL_OUTOF10: 0
PAINLEVEL_OUTOF10: 9
PAINLEVEL_OUTOF10: 0
PAINLEVEL_OUTOF10: 9
PAINLEVEL_OUTOF10: 10
PAINLEVEL_OUTOF10: 8
PAINLEVEL_OUTOF10: 7
PAINLEVEL_OUTOF10: 9

## 2017-11-09 NOTE — OR SURGEON
Immediate Post OP Note    PreOp Diagnosis:   1.  Left sublingal space abscess.  2.  Acute infection tooth number 18.  3.  Advanced dental decay teeth numbers 1, 3, 4, 5, 12, 13, 16, and 18.  4.  Insulin dependent diabetes mellitus with poor control.    PostOp Diagnosis:   1.  Left sublingal space abscess.  2.  Acute infection tooth number 18.  3.  Advanced dental decay teeth numbers 1, 3, 4, 5, 12, 13, 16, and 18.  4.  Insulin dependent diabetes mellitus with poor control.    Procedure(s):  1.  Extraction of teeth numbers 1, 3, 4, 5, 12, 13, 16, and 18.  2.  Extraoral incision and drainage of the sublingual space abscess.      Wound Class: Dirty or Infected    Surgeon(s):  Osman Young M.D.    Anesthesiologist/Type of Anesthesia:  Anesthesiologist: Yann Hogan M.D.; Juan F Naylor M.D./General    Surgical Staff:  Circulator: Tatyana Walker R.N.  Relief Circulator: Ender Stewart R.N.  Relief Scrub: Charles Ascencio  Scrub Person: Iris Higgins    Specimens:  Swabs to microbiology for gs/c/s.    Drains:  3/8 inch Penrose drain to the left sublingual space.    Estimated Blood Loss: 20 ml    Findings: 2 cc pus drained lingually and scant pus drained through a left submandibular incision.  Teeth extracted with resorption of the right maxillary sinus floor.     Complications: None        11/8/2017 11:30 PM Osman Young

## 2017-11-09 NOTE — PROGRESS NOTES
[facility] Hospitalist Progress Note    Date of Service: 2017    Chief Complaint  27 y.o. female admitted 2017 with left-sided neck swelling and pain.    Interval Problem Update  Mrs. Young is a 27-year-old female who was admitted with left-sided neck pain and throat pain. The patient's pain was isolated to be secondary to an abscess with multiple dental decay spell. Oral surgery was able to successfully take the patient to surgery last night and evacuate the abscess and remove the infected teeth. Patient prior to surgery was also having some rigors and she chills and fevers up into the 104 temperature. Since the surgery was successful the patient's fevers have subsided and rigors have also subsided. The patient's DKA at this point still persists we are still utilizing fluid resuscitation and insulin support and we are stabilizing her electrolytes. Patient at this point still remains in a serious situation and she has slowly been recovering but not quite back to her baseline. Continue at this point with pain management antibiotics as well as insulin.    Consultants/Specialty  Oral surgery    Disposition  To be determined        Review of Systems   Unable to perform ROS: Acuity of condition      Physical Exam  Laboratory/Imaging   Hemodynamics  Temp (24hrs), Av.5 °C (101.3 °F), Min:36.3 °C (97.3 °F), Max:41.4 °C (106.5 °F)   Temperature: 37.8 °C (100 °F)  Pulse  Av  Min: 97  Max: 200 Heart Rate (Monitored): (!) 128  Blood Pressure: 114/79, NIBP: 126/87      Respiratory      Respiration: 20, Pulse Oximetry: 98 %     Work Of Breathing / Effort: Mild  RUL Breath Sounds: Clear, RML Breath Sounds: Clear, RLL Breath Sounds: Diminished, NANCY Breath Sounds: Clear, LLL Breath Sounds: Diminished    Fluids    Intake/Output Summary (Last 24 hours) at 17 1520  Last data filed at 17 0800   Gross per 24 hour   Intake             1500 ml   Output               15 ml   Net             1485 ml        Nutrition  Orders Placed This Encounter   Procedures   • DIET NPO     Standing Status:   Standing     Number of Occurrences:   1     Order Specific Question:   Restrict to:     Answer:   Sips with Medications [3]     Physical Exam   Constitutional: She appears well-developed and well-nourished. She appears lethargic. She is uncooperative. She has a sickly appearance. Nasal cannula in place.   HENT:   Head: Normocephalic and atraumatic.   Right Ear: External ear normal.   Left Ear: External ear normal.   Mouth/Throat:       Mouth is packed and some bleeding noted   Eyes: Conjunctivae and EOM are normal. Pupils are equal, round, and reactive to light.   Neck: No JVD present. Tracheal tenderness present. Neck rigidity present. Edema and decreased range of motion present. No tracheal deviation present.   Cardiovascular: Normal rate and regular rhythm.  Exam reveals no gallop and no friction rub.    Pulmonary/Chest: Effort normal and breath sounds normal. No stridor. No respiratory distress.   Abdominal: Soft. Bowel sounds are normal.   Genitourinary:   Genitourinary Comments: Pitt catheter   Musculoskeletal: She exhibits no edema or tenderness.   Neurological: She has normal reflexes. She appears lethargic. She is disoriented. No cranial nerve deficit. GCS eye subscore is 3. GCS verbal subscore is 1. GCS motor subscore is 6.   Skin: Skin is warm and dry.   Psychiatric: She is slowed. Cognition and memory are impaired. She is noncommunicative.   Nursing note and vitals reviewed.      Recent Labs      11/08/17   0007  11/08/17   1858  11/09/17   0011   WBC  6.0  5.1  5.6   RBC  4.14*  4.60  4.06*   HEMOGLOBIN  11.7*  13.2  11.7*   HEMATOCRIT  36.1*  40.5  37.6   MCV  87.2  88.0  92.6   MCH  28.3  28.7  28.8   MCHC  32.4*  32.6*  31.1*   RDW  41.1  41.6  44.1   PLATELETCT  186  205  149*   MPV  9.9  9.9  9.7     Recent Labs      11/08/17   0818  11/08/17   1329  11/09/17   0011   SODIUM  135  134*  133*   POTASSIUM   4.2  4.1  4.2   CHLORIDE  107  102  106   CO2  17*  20  11*   GLUCOSE  266*  237*  305*   BUN  10  8  9   CREATININE  0.38*  0.52  0.55   CALCIUM  8.2*  8.7  7.7*                      Assessment/Plan     * Mandibular abscess- (present on admission)   Assessment & Plan    Patient's oral cavity at this point had multiple cavities as well as abscesses around the molar teeth in the left mandibular area. The patient underwent surgical resection of these teeth and a clean out of the abscess by oral surgery last night with extraction of teeth numbers 1, 3, 4, 5, 12, 13, 16, and 18.and extraoral incision and drainage of the left sublingual space abscess.  Continue at this point with antibiotic management pain management and monitoring of current situation.        Hyperglycemia due to type 1 diabetes mellitus (CMS-HCC)- (present on admission)   Assessment & Plan    Patient's early DKA at this point still persists her blood sugars at 305 she does not need an insulin drip yet. The patient's anion gap is 16 which has worsened from 11 and her CO2 levels low at 11. Electrolytes at this point have been monitored and are stable potassium is up at 4.2 and sodium is 133 the patient's blood sugars at this point are carefully monitored and she's been given subcutaneous insulin.          Gastroparesis due to DM (CMS-HCC)- (present on admission)   Assessment & Plan    Patient remains on antiemetics so far she has not vomited.        Thrombocytopenia (CMS-[facility])- (present on admission)   Assessment & Plan    Platelets at this point are low at 149 currently she does not have any bleeding most likely secondary to the multiple teeth she had extracted.        Sore throat- (present on admission)   Assessment & Plan    Patient's throat pain at this point has improved since the patient's abscess was evacuated.        GERD (gastroesophageal reflux disease)- (present on admission)   Assessment & Plan    Patient remains at this point on PPI currently  no heartburn.            Reviewed items::  EKG reviewed, Labs reviewed, Medications reviewed and Radiology images reviewed  Pitt catheter::  No Pitt  DVT prophylaxis pharmacological::  Heparin  Ulcer Prophylaxis::  Yes  Antibiotics:  Treating active infection/contamination beyond 24 hours perioperative coverage

## 2017-11-09 NOTE — CARE PLAN
Problem: Communication  Goal: The ability to communicate needs accurately and effectively will improve  Outcome: PROGRESSING AS EXPECTED  Pt calls appropriately, expresses needs and concerns to staff appropriately    Problem: Pain Management  Goal: Pain level will decrease to patient's comfort goal  Outcome: PROGRESSING AS EXPECTED  Pt medicated per MAR, now resting comfortably.

## 2017-11-09 NOTE — THERAPY
SLP Contact Note: Discussed order with RN. Patient had oral surgery yesterday, still has gauze and in her mouth, and is a lethargic. Will re-attempt when patient is appropriate.

## 2017-11-09 NOTE — PROGRESS NOTES
Pt down to surgery. Monitor room notified, verified with Jovanna (CCT). Consent signed, pre-op checklist completed.

## 2017-11-09 NOTE — CODE DOCUMENTATION
Rapid response called for temperature of 104 F. -200s. Blood cultures, cbc, and lactic acid ordered. Stat EKG ordered. Tylenol 650mg PO given. Ice packs in use. Cooling blanket ordered.

## 2017-11-09 NOTE — ASSESSMENT & PLAN NOTE
Platelets at this point are low at 149 currently she does not have any bleeding most likely secondary to the multiple teeth she had extracted.

## 2017-11-09 NOTE — PROGRESS NOTES
Received report from day shift RN, assumed pt care. Rapid response in progress. Fall precautions in place. Call light and bedside table within reach. Assessment completed. Updated pt about POC, pt verbalized understanding. Will continue to monitor.

## 2017-11-09 NOTE — CARE PLAN
Problem: Communication  Goal: The ability to communicate needs accurately and effectively will improve    Intervention: Educate patient and significant other/support system about the plan of care, procedures, treatments, medications and allow for questions  Pt educated about POC r/t surgery, verbalized understanding.      Problem: Pain Management  Goal: Pain level will decrease to patient's comfort goal  Pt's pain adequately controlled with dilaudid post surgery (see MAR). However, antiemetics necessary as pt was experiencing moments of nausea.

## 2017-11-09 NOTE — PROGRESS NOTES
I WAS CALLED TO SEE THE PT IN RAPID RESPONSE WITH HEART RATE OF 160S.  PT IS SEEN AT THE BED SIDE AND HER EKG SHOWS SINUS TACHYCARDIA.HER FEVER OF 40 DEGREES C IS NOTED.. WE WILL GIVE EXTRA DOSE OF TYLENOL NOW,THE TACHYCARDIA APPEARS TO BE 2ND TO FEVER.SHE IS ALSO GETTING 2 LITERS BOLUS OF IV FLUID.WE WILL GIVE LOPRESSOR IV TO SLOW HER HEART RATE DOWN.

## 2017-11-09 NOTE — PROGRESS NOTES
Jerilyn Walton Fall Risk Assessment:     Last Known Fall: No falls  Mobility: Immobilized/requires assist of one person  Medications: Cardiovascular or central nervous system meds  Mental Status/LOC/Awareness: Awake, alert, and oriented to date, place, and person  Toileting Needs: No needs  Volume/Electrolyte Status: Use of IV fluids/tube feeds  Communication/Sensory: No deficits  Behavior: Appropriate behavior  Jerilyn Walton Fall Risk Total: 6  Fall Risk Level: NO RISK    Universal Fall Precautions:  call light/belongings in reach, bed in low position and locked, wheelchairs and assistive devices out of sight, siderails up x 2, use non-slip footwear, adequate lighting, clutter free and spill free environment, educate on level of risk, educate to call for assistance    Fall Risk Level Interventions:          Patient Specific Interventions:     Medication: review medications with patient and family, assess for medications that can be discontinued or dosage decreased and limit combination of prn medications  Mental Status/LOC/Awareness: reinforce falls education, check on patient hourly, utilize bed/chair fall alarm and reinforce the use of call light  Toileting: instruct patient/family on the need to call for assistance when toileting, do not leave patient unattended in bathroom/refer to toileting scripting and toilet prior to giving pain medications  Volume/Electrolyte Status: administer medications as ordered for nausea and vomiting, monitor abnormal lab values and ensure IV fluids are appropriate  Communication/Sensory: update plan of care on whiteboard and ensure proper positioning when transferrng/ambulating  Behavioral: administer medication as ordered and instruct/reinforce fall program rationale  Mobility: utilize bed/chair fall alarm and ensure bed is locked and in lowest position

## 2017-11-09 NOTE — PROGRESS NOTES
Pt returned from surgery, monitor room notified. Tele box back on pt, verified with Jovanna (CCT). Received report from CAIT Burk (surgery). Fall precautions in place. A&O x 4. Post-op vitals initiated. Will continue to monitor.

## 2017-11-09 NOTE — CONSULTS
DATE OF SERVICE:  11/08/2017    ORAL SURGERY CONSULTATION    REASON FOR CONSULTATION:  Increasing left lower jaw and floor of mouth pain   and swelling.    REQUESTING PHYSICIAN:  Ute Cooper MD, hospitalist.    IDENTIFICATION/HISTORY OF PRESENT ILLNESS:  This patient is a 27-year-old   woman admitted last night with left-sided neck swelling and worsening pain.    She was found to be in diabetic ketoacidosis and resuscitation with insulin   and saline began and has been effective.  The CT scan was obtained and showed   inflammatory changes of the left submandibular and parapharyngeal regions   without notable abscess.  A panoramic mandibular x-ray was obtained, which   showed multiple decayed teeth including numbers 1, 3, 4, 5, 13, 16 and 18.    Tooth #18, the left lower second molar was noted to have periapical   radiolucency.  She was started on Rocephin and clindamycin.  She has remained   stable on the zurita with slowly correcting blood glucose levels and white blood   cell count.  She was admitted with a white blood cell count of 9.4, which has   decreased to 6 today.  I was called for consultation to address her poor   dentition and deep space infection.    I saw the patient on the medicine floor.  She was lying comfortably in minimal   distress.  She reportedly ate lunch at 1:00 p.m.  She complains of difficulty   opening her jaw and a painful fullness under the left side of her tongue and   in the throat.  She has been unable to swallow fluids, but has had difficulty   eating solids.  She denies fever and chills.  She denies chest pain, shortness   of breath, and paresthesias.  She is mobilized to the bathroom and has stable   ambulation.    PAST MEDICAL HISTORY:  1.  Diabetes type 1, insulin dependent with sporadic testing.  2.  History of diabetic ketoacidosis.  3.  Gastroparesis.  4.  History of UTI and nephritis.  5.  History of pneumonia.  6.  Chronic back pain.  7.  History of bronchitis.  8.   Hypercholesterolemia.    PAST  SURGICAL HISTORY:  1.  Gastroscopy with biopsy in 2014.  2.  Repair of pneumothorax after central line placement.    OUTPATIENT MEDICATIONS:  1.  NovoLog.  2.  Atorvastatin.  3.  Insulin, glargine.    ALLERGIES:  1.  PENICILLIN.  2.  LISINOPRIL.  3.  PROMETHAZINE.    SOCIAL HISTORY:  The patient lives locally in [place].  She denies smoking   and alcohol use.  No reported drug use.    REVIEW OF SYSTEMS:  Negative for chest pain, shortness of breath, peripheral   neuropathy, dizziness, headache or seizures.    PHYSICAL EXAMINATION:  VITAL SIGNS:  T-max 100.1, T-current 99.2 degrees Fahrenheit; pulse 118;   respirations 18; blood pressure 126/82; oxygen saturation 92-96% on room air.    Height 5 foot 5 inches, weight 169 pounds.  GENERAL:  Patient appears overweight.  Normal mentation and conversation.  She   is in minimal distress over her condition.  Pain controlled.  HEENT:  Head is atraumatic and normocephalic.  Pupils are equally round and   reactive with extraocular movements intact.  Nares clear.  Hearing is grossly   intact bilaterally.  The face is symmetric; however, the submental and   submandibular regions appear full and are tender on the left in the   submandibular region.  There is moderate trismus with a maximum incisal   opening approximately half normal.  There are multiple decayed teeth to the   gumline including numbers 1, 3, 4, 5, 13 and 16.  Tooth #18 is decayed and   tender with floor of mouth fullness.  The oropharynx is poorly visualized.    The tongue is mildly elevated.  There is anterior floor of mouth tenderness   and swelling as well.  There is minor scattered decay throughout the   dentition.  NECK:  Supple inferiorly.  She is full on the submental and submandibular   regions with likely fluctuance.  The trachea is in the midline.  The right   submandibular region is soft.  CARDIOVASCULAR:  Regular rhythm with tachycardia.  No murmurs by report.  CHEST:   Symmetric chest rise with clear breathing.  No wheezes.  ABDOMEN:  Soft, nontender, nondistended.  EXTREMITIES:  Warm without edema.  There is a strong odor from her foot   region.  NEUROLOGIC:  Cranial nerves II-XII grossly intact.  Nonfocal exam.  Normal   mentation and conversation.  PSYCH:  Mood seems appropriate.    LABORATORY DATA:  1.  CBC with white blood cell count of 6.0, hematocrit 36.1, platelets 186,   neutrophils 62% (white blood cell count 9.4 and neutrophils 84% on   2017).  2.  Complete metabolic panel with sodium 134, potassium 4.1, chloride 102, CO2   20, glucose 237, BUN 8, creatinine 0.52, calcium 8.7.  3.  Coagulation studies within normal limits.  4.  Negative strep A throat culture and negative beta strep throat culture.    IMAGIN.  Panoramic mandibular x-ray shows advanced decay of teeth numbers 1, 3, 4,   5, 12, 13, 16, and 18 with periapical osteolytic changes associated with teeth   numbers 18 and 30.  Both of these teeth showed signs of prior endodontic   therapy, the right sinus appears hazy compared to the left.  2.  Maxillofacial CT scan:  Shows opaque obliteration of the right maxillary   sinus.  Advanced dental decay as listed above.  There are inflammatory soft   tissue changes of the left floor of mouth and submandibular region without   abscess.  There is a very subtle shift of pharyngeal tissues to the right   without collapse.  No bony fractures.    ASSESSMENT AND PLAN:  This 27-year-old woman with type 1 insulin-dependent   diabetes mellitus has developed a left deep space submandibular and sublingual   space cellulitis with possible abscess caused by acutely infected tooth #18,   which shows signs of chronic infection.  She also exhibits radiographic signs   of chronic right maxillary and ethmoid sinusitis likely caused by advanced   decay and chronic infection of teeth number 1, 3, 4, and 5 in the right upper   quadrant of the mouth.  I feel that extraction of the  infection causing teeth   is strongly indicated and should help the patient with future blood sugar or   blood glucose control.  She is on adequate antibiotic coverage.  I have   discussed the nature of the infection and indicated treatment with the patient   in detail.  She understands that an extraoral approach to a submandibular and   sublingual space infection is necessary with potential for scarring of the   skin.  I have recommended extraction of teeth numbers 1, 3, 4, 5, 12, 13, 16,   and 18 with debridement of infected granulation tissue at these sites as   necessary.  Other risks include, but are not limited to persistent infection   sinus exposure and persistent communication, infection, bleeding, need for   further surgery and lack of teeth following the surgery for which she will   need to find a general dentist to obtain prosthetic replacement.  She seems to   understand these issues and risks and gave formal consent to proceed as   planned.  She is scheduled for the operating room tonight to be treated under   general anesthesia.  A consent form is on chart.  I anticipate a 24-48 hour   period of IV antibiotics with drain removal at 48 hours prior to discharge.       ____________________________________     STEVENSON PRITCHARD MD,DDS    GORD[facility] / UDAY    DD:  11/08/2017 18:03:53  DT:  11/08/2017 23:30:44    D#:  [id number]  Job#:  [id number]

## 2017-11-09 NOTE — PROGRESS NOTES
Informed Dr. Cooper that pt appears to be declining: MEWS 3, pale/ clammy, low grade temp, HR 130s, more lethargic, c/o increased unrelieved pain. Pending X-ray, fluids increased to 150, fentanyl dosage increased, lactic acid ordered.

## 2017-11-09 NOTE — PROGRESS NOTES
Assumed care of patient. Bedside report received from CAIT Barrera. Updated on POC, call light within reach and fall precautions in place. Bed locked and in lowest position. Patient asleep at this time. MAR, labs, orders reviewed.

## 2017-11-09 NOTE — OP REPORT
DATE OF SERVICE:  11/08/2017    PREOPERATIVE DIAGNOSES:  1.  Left sublingual space abscess.  2.  Acute infection, tooth #18.  3.  Advanced dental decay, teeth numbers 1, 3, 4, 5, 12, 13, 16, and 18.  4.  Insulin-dependent diabetes mellitus with poor control.    POSTOPERATIVE  DIAGNOSES:  1.  Left sublingual space abscess.  2.  Acute infection, tooth #18.  3.  Advanced dental decay, teeth numbers 1, 3, 4, 5, 12, 13, 16, and 18.  4.  Insulin-dependent diabetes mellitus with poor control.    PROCEDURES:  1.  Extraction of teeth numbers 1, 3, 4, 5, 12, 13, 16, and 18.  2.  Extraoral incision and drainage of the left sublingual space abscess.    SURGEON:  Osman Young MD, DDS    ASSISTANT:  None.    ANESTHESIA:  General anesthesia with nasal endotracheal intubation.    ANESTHESIOLOGISTS:  Juan F Naylor MD and Yann Hogan MD    INDICATION:  This patient is a 27-year-old woman with a long history of dental   problems who presented to the [facility]   Department on the evening of 11/07/2017, with increasing pain and swelling of   the left lower jaw and submandibular region.  She was found to be   hyperglycemic and was admitted on suspicion of diabetic ketoacidosis by the   hospitalist service.  A maxillofacial CT scan and a panoramic mandibular x-ray   were taken and showed left lingual region inflammatory changes without clear   abscess and advanced decay of tooth #18 with periapical radiolucency   consistent with chronic infection.  I was called for consultation.  She was   seen on the medical floor and complaining of pain and swelling with difficulty   swallowing and pain on opening the mouth.  Examination revealed a tender   fluctuant swelling of the left floor of the mouth and tender swollen   submandibular region.  Advanced dental decay was noted at multiple teeth   including numbers 1, 3, 4, 5, 12, 13, 16, and 18.  Tooth #18 was tender in the   region of the painful swelling.   Radiographs were reviewed and were found to   be consistent with the above findings.  A plan was established to treat the   infection and prevent future infection with extraction of the decayed teeth,   numbers 1, 3, 4, 5, 12, 13 16, and 18 and intraoral and extraoral incision and   drainage of the left sublingual abscess as indicated.  The procedure risks   and benefits were described and discussed in detail with the patient.  She was   informed that there would be scarring of the skin with an extraoral incision   in the left submandibular region with drain placement as necessary.    Extraction of the decayed teeth was recommended and she agreed to this   treatment.  Risks were described and include persistent infection, scarring of   the skin, bleeding, need for further surgery, and loss of teeth requiring a   general dentist to provide replacement teeth.  All questions were answered and   formal written consent was obtained.  The patient was scheduled for the   operating room today.    PROCEDURE DESCRIPTION:  Patient was taken to the operating room at [facility] on the late evening of 11/08/2017.  She   was placed in a supine position and general anesthesia with nasal   endotracheal intubation was performed without complication by Dr. Naylor.    She was positioned, prepped and draped in the usual fashion for an intraoral   and extraoral incision and drainage procedure.  The eyes were taped.  Face and   neck were prepped with Betadine paint.  The nasoendotracheal tube was secured   with a towel head wrap gauze and tape.    The oropharynx was suctioned.  A moistened throat pack was placed.    Approximately 12 mL of 1% lidocaine with 1:100,000 dilution of epinephrine was   administered via regional oral blocks and local infiltration in the regions   of dental extraction, also in the left submandibular region approximately 2 cm   below the inferior border of the mandible over  the region of swelling.    Attention was turned to the mouth.  Teeth numbers 1, 3, 4, 5, 12, 13, 16 and   18 were extracted with gingival papillary incisions, periodontal separation   with full thickness flap elevation of the gingiva as necessary, elevator   luxation, and forceps removal of the dental fragments.  Sockets were curetted   thoroughly of granulation tissue.  Loose buccal bone was removed from the   right upper quadrant.  All bony edges were smoothed with a bone file.  The   sites were irrigated copiously with normal saline and the gingival tissues   were sutured with multiple interrupted 3-0 chromic gut stitches.  The left   lingual and gingival tissues were elevated with insertion of an instrument   along the medial border of the mandible, approximately 2 mL of gray cloudy fluid  was drained.    Attention was turned to the left submandibular region.  A 2 cm incision was   made through skin and subcutaneous tissues approximately 2 cm below and   parallel to the inferior border of the mandible.  Blunt dissection with a   Schnidt clamp was carried out to the inferior border of the mandible.  The   sublingual space was entered and scant pus was drained.  In the sweeping   motion, the periosteum was  from the mandible, opening all   loculations of the abscess.  The site was irrigated copiously with normal   saline, which drained through the mouth and through the incision.  Two 3/8   inch Penrose drains were placed through the incision and drainage site   extraorally and into the sublingual space.  The drains were secured to the   skin with two 3-0 nylon sutures.    The mouth was irrigated and suctioned.  A throat pack was removed.  The   oropharynx was suctioned.  Gauze was placed over the extraction sockets for   hemostasis.    The patient was undraped and cleansed.  The submandibular incision and   drainage site was dressed with gauze fluffs and tape.  The patient was turned   over to anesthesia  for emergence and extubation.  This was performed without   complication by Dr. Hogan and the patient was transferred to the PACU awake in   a stable condition.    ESTIMATED BLOOD LOSS:  20 mL    SPECIMENS:  Swabs to microbiology for Gram stain culture and sensitivity.    DRAINS:  Two 3/8 inch Penrose drains placed into the left sublingual space.    COMPLICATIONS:  None.    DISPOSITION:  After recovery in the PACU, the patient will be returned to the   medicine zurita and readmitted to the hospitalist service for continued management   of her hyperglycemia and diabetes and continued antibiotics and pain control.  I   will follow the patient while in-house.  Her drains should be able to be   removed in 48 hours and prior to discharge.       ____________________________________     STEVENSON PRITCHARD MD,DDS    JOSÉ LUISM / UDAY    DD:  11/08/2017 23:53:04  DT:  11/09/2017 04:59:03    D#:  [id number]  Job#:  [id number]

## 2017-11-09 NOTE — PROGRESS NOTES
Called by monitor tech- pt HR sustaining 170s-180s. Went to check on pt, found to be sleeping but aggressively shaking. Vital signs obtained: Temp 103.9F, , unable to obtain BP. STAT page to Dr. Cooper. Told it was due to the abscess/ infection and that she was going to surgery later. Verbal order to give tylenol and 2L bolus. Cooling blanket ordered. No other orders provided.

## 2017-11-10 VITALS
OXYGEN SATURATION: 93 % | BODY MASS INDEX: 29.02 KG/M2 | TEMPERATURE: 96.9 F | HEIGHT: 65 IN | RESPIRATION RATE: 16 BRPM | WEIGHT: 174.16 LBS | SYSTOLIC BLOOD PRESSURE: 108 MMHG | HEART RATE: 129 BPM | DIASTOLIC BLOOD PRESSURE: 60 MMHG

## 2017-11-10 LAB
ANION GAP SERPL CALC-SCNC: 12 MMOL/L (ref 0–11.9)
BUN SERPL-MCNC: 9 MG/DL (ref 8–22)
CALCIUM SERPL-MCNC: 8.3 MG/DL (ref 8.5–10.5)
CHLORIDE SERPL-SCNC: 109 MMOL/L (ref 96–112)
CO2 SERPL-SCNC: 15 MMOL/L (ref 20–33)
CREAT SERPL-MCNC: 0.5 MG/DL (ref 0.5–1.4)
ERYTHROCYTE [DISTWIDTH] IN BLOOD BY AUTOMATED COUNT: 44.2 FL (ref 35.9–50)
GFR SERPL CREATININE-BSD FRML MDRD: >60 ML/MIN/1.73 M 2
GLUCOSE BLD-MCNC: 197 MG/DL (ref 65–99)
GLUCOSE BLD-MCNC: 202 MG/DL (ref 65–99)
GLUCOSE BLD-MCNC: 216 MG/DL (ref 65–99)
GLUCOSE BLD-MCNC: 246 MG/DL (ref 65–99)
GLUCOSE SERPL-MCNC: 257 MG/DL (ref 65–99)
HCT VFR BLD AUTO: 31.5 % (ref 37–47)
HGB BLD-MCNC: 10.1 G/DL (ref 12–16)
MCH RBC QN AUTO: 29 PG (ref 27–33)
MCHC RBC AUTO-ENTMCNC: 32.1 G/DL (ref 33.6–35)
MCV RBC AUTO: 90.5 FL (ref 81.4–97.8)
PLATELET # BLD AUTO: 171 K/UL (ref 164–446)
PMV BLD AUTO: 10 FL (ref 9–12.9)
POTASSIUM SERPL-SCNC: 3.9 MMOL/L (ref 3.6–5.5)
RBC # BLD AUTO: 3.48 M/UL (ref 4.2–5.4)
SODIUM SERPL-SCNC: 136 MMOL/L (ref 135–145)
WBC # BLD AUTO: 6.1 K/UL (ref 4.8–10.8)

## 2017-11-10 PROCEDURE — 700111 HCHG RX REV CODE 636 W/ 250 OVERRIDE (IP): Performed by: INTERNAL MEDICINE

## 2017-11-10 PROCEDURE — 700111 HCHG RX REV CODE 636 W/ 250 OVERRIDE (IP): Performed by: HOSPITALIST

## 2017-11-10 PROCEDURE — 700111 HCHG RX REV CODE 636 W/ 250 OVERRIDE (IP): Performed by: ORAL & MAXILLOFACIAL SURGERY

## 2017-11-10 PROCEDURE — G8997 SWALLOW GOAL STATUS: HCPCS | Mod: CH

## 2017-11-10 PROCEDURE — 36415 COLL VENOUS BLD VENIPUNCTURE: CPT

## 2017-11-10 PROCEDURE — 700102 HCHG RX REV CODE 250 W/ 637 OVERRIDE(OP): Performed by: ORAL & MAXILLOFACIAL SURGERY

## 2017-11-10 PROCEDURE — A9270 NON-COVERED ITEM OR SERVICE: HCPCS | Performed by: ORAL & MAXILLOFACIAL SURGERY

## 2017-11-10 PROCEDURE — 700105 HCHG RX REV CODE 258: Performed by: HOSPITALIST

## 2017-11-10 PROCEDURE — 85027 COMPLETE CBC AUTOMATED: CPT

## 2017-11-10 PROCEDURE — 99239 HOSP IP/OBS DSCHRG MGMT >30: CPT | Performed by: HOSPITALIST

## 2017-11-10 PROCEDURE — 700101 HCHG RX REV CODE 250: Performed by: FAMILY MEDICINE

## 2017-11-10 PROCEDURE — 92610 EVALUATE SWALLOWING FUNCTION: CPT

## 2017-11-10 PROCEDURE — 700102 HCHG RX REV CODE 250 W/ 637 OVERRIDE(OP): Performed by: INTERNAL MEDICINE

## 2017-11-10 PROCEDURE — 700101 HCHG RX REV CODE 250: Performed by: HOSPITALIST

## 2017-11-10 PROCEDURE — 700102 HCHG RX REV CODE 250 W/ 637 OVERRIDE(OP): Performed by: HOSPITALIST

## 2017-11-10 PROCEDURE — 80048 BASIC METABOLIC PNL TOTAL CA: CPT

## 2017-11-10 PROCEDURE — 82962 GLUCOSE BLOOD TEST: CPT | Mod: 91

## 2017-11-10 PROCEDURE — 770020 HCHG ROOM/CARE - TELE (206)

## 2017-11-10 PROCEDURE — A9270 NON-COVERED ITEM OR SERVICE: HCPCS | Performed by: INTERNAL MEDICINE

## 2017-11-10 PROCEDURE — G8996 SWALLOW CURRENT STATUS: HCPCS | Mod: CI

## 2017-11-10 RX ORDER — OXYCODONE HYDROCHLORIDE AND ACETAMINOPHEN 5; 325 MG/1; MG/1
1-2 TABLET ORAL EVERY 4 HOURS PRN
Qty: 30 TAB | Refills: 0 | Status: SHIPPED | OUTPATIENT
Start: 2017-11-10 | End: 2018-03-15

## 2017-11-10 RX ORDER — INSULIN GLARGINE 100 [IU]/ML
35 INJECTION, SOLUTION SUBCUTANEOUS 2 TIMES DAILY
Status: DISCONTINUED | OUTPATIENT
Start: 2017-11-10 | End: 2017-11-11 | Stop reason: HOSPADM

## 2017-11-10 RX ORDER — CLINDAMYCIN HYDROCHLORIDE 300 MG/1
300 CAPSULE ORAL 3 TIMES DAILY
Qty: 30 CAP | Refills: 0 | Status: SHIPPED | OUTPATIENT
Start: 2017-11-10 | End: 2017-11-20

## 2017-11-10 RX ORDER — INSULIN GLARGINE 100 [IU]/ML
35 INJECTION, SOLUTION SUBCUTANEOUS 2 TIMES DAILY
Qty: 10 ML | Refills: 2 | Status: SHIPPED | OUTPATIENT
Start: 2017-11-10 | End: 2018-03-15 | Stop reason: CLARIF

## 2017-11-10 RX ORDER — METOPROLOL TARTRATE 1 MG/ML
5 INJECTION, SOLUTION INTRAVENOUS EVERY 4 HOURS PRN
Status: DISCONTINUED | OUTPATIENT
Start: 2017-11-10 | End: 2017-11-11 | Stop reason: HOSPADM

## 2017-11-10 RX ORDER — METOPROLOL TARTRATE 1 MG/ML
5 INJECTION, SOLUTION INTRAVENOUS ONCE
Status: COMPLETED | OUTPATIENT
Start: 2017-11-10 | End: 2017-11-10

## 2017-11-10 RX ADMIN — ATORVASTATIN CALCIUM 20 MG: 20 TABLET, FILM COATED ORAL at 20:44

## 2017-11-10 RX ADMIN — HYDROMORPHONE HYDROCHLORIDE 1 MG: 1 INJECTION, SOLUTION INTRAMUSCULAR; INTRAVENOUS; SUBCUTANEOUS at 00:07

## 2017-11-10 RX ADMIN — OXYCODONE HYDROCHLORIDE AND ACETAMINOPHEN 2 TABLET: 5; 325 TABLET ORAL at 17:31

## 2017-11-10 RX ADMIN — METOCLOPRAMIDE HYDROCHLORIDE 10 MG: 10 TABLET ORAL at 11:26

## 2017-11-10 RX ADMIN — OXYCODONE HYDROCHLORIDE AND ACETAMINOPHEN 2 TABLET: 5; 325 TABLET ORAL at 01:34

## 2017-11-10 RX ADMIN — METOPROLOL TARTRATE 5 MG: 1 INJECTION, SOLUTION INTRAVENOUS at 02:18

## 2017-11-10 RX ADMIN — INSULIN GLARGINE 35 UNITS: 100 INJECTION, SOLUTION SUBCUTANEOUS at 20:44

## 2017-11-10 RX ADMIN — STANDARDIZED SENNA CONCENTRATE AND DOCUSATE SODIUM 2 TABLET: 8.6; 5 TABLET, FILM COATED ORAL at 08:38

## 2017-11-10 RX ADMIN — HEPARIN SODIUM 5000 UNITS: 5000 INJECTION, SOLUTION INTRAVENOUS; SUBCUTANEOUS at 06:39

## 2017-11-10 RX ADMIN — CLINDAMYCIN IN 5 PERCENT DEXTROSE 600 MG: 12 INJECTION, SOLUTION INTRAVENOUS at 03:52

## 2017-11-10 RX ADMIN — METOPROLOL TARTRATE 5 MG: 1 INJECTION, SOLUTION INTRAVENOUS at 01:53

## 2017-11-10 RX ADMIN — CEFAZOLIN SODIUM 2 G: 2 INJECTION, SOLUTION INTRAVENOUS at 17:54

## 2017-11-10 RX ADMIN — METOCLOPRAMIDE HYDROCHLORIDE 10 MG: 10 TABLET ORAL at 17:31

## 2017-11-10 RX ADMIN — HEPARIN SODIUM 5000 UNITS: 5000 INJECTION, SOLUTION INTRAVENOUS; SUBCUTANEOUS at 13:24

## 2017-11-10 RX ADMIN — HYDROMORPHONE HYDROCHLORIDE 1 MG: 1 INJECTION, SOLUTION INTRAMUSCULAR; INTRAVENOUS; SUBCUTANEOUS at 11:00

## 2017-11-10 RX ADMIN — INSULIN GLARGINE 35 UNITS: 100 INJECTION, SOLUTION SUBCUTANEOUS at 09:56

## 2017-11-10 RX ADMIN — HYDROMORPHONE HYDROCHLORIDE 1 MG: 1 INJECTION, SOLUTION INTRAMUSCULAR; INTRAVENOUS; SUBCUTANEOUS at 08:38

## 2017-11-10 RX ADMIN — METOPROLOL TARTRATE 5 MG: 1 INJECTION, SOLUTION INTRAVENOUS at 03:48

## 2017-11-10 RX ADMIN — INSULIN LISPRO 2 UNITS: 100 INJECTION, SOLUTION INTRAVENOUS; SUBCUTANEOUS at 06:35

## 2017-11-10 RX ADMIN — METOCLOPRAMIDE HYDROCHLORIDE 10 MG: 10 TABLET ORAL at 08:38

## 2017-11-10 RX ADMIN — CEFAZOLIN SODIUM 2 G: 2 INJECTION, SOLUTION INTRAVENOUS at 06:36

## 2017-11-10 RX ADMIN — CLINDAMYCIN IN 5 PERCENT DEXTROSE 600 MG: 12 INJECTION, SOLUTION INTRAVENOUS at 11:26

## 2017-11-10 RX ADMIN — INSULIN LISPRO 1 UNITS: 100 INJECTION, SOLUTION INTRAVENOUS; SUBCUTANEOUS at 17:32

## 2017-11-10 RX ADMIN — HYDROMORPHONE HYDROCHLORIDE 1 MG: 1 INJECTION, SOLUTION INTRAMUSCULAR; INTRAVENOUS; SUBCUTANEOUS at 13:24

## 2017-11-10 RX ADMIN — INSULIN LISPRO 2 UNITS: 100 INJECTION, SOLUTION INTRAVENOUS; SUBCUTANEOUS at 20:43

## 2017-11-10 RX ADMIN — PROCHLORPERAZINE EDISYLATE 10 MG: 5 INJECTION INTRAMUSCULAR; INTRAVENOUS at 01:34

## 2017-11-10 RX ADMIN — INSULIN LISPRO 2 UNITS: 100 INJECTION, SOLUTION INTRAVENOUS; SUBCUTANEOUS at 11:26

## 2017-11-10 RX ADMIN — SODIUM CHLORIDE: 9 INJECTION, SOLUTION INTRAVENOUS at 01:08

## 2017-11-10 ASSESSMENT — PAIN SCALES - GENERAL
PAINLEVEL_OUTOF10: 9
PAINLEVEL_OUTOF10: 6
PAINLEVEL_OUTOF10: 6
PAINLEVEL_OUTOF10: 9
PAINLEVEL_OUTOF10: 8
PAINLEVEL_OUTOF10: 7
PAINLEVEL_OUTOF10: 9
PAINLEVEL_OUTOF10: 7
PAINLEVEL_OUTOF10: 8
PAINLEVEL_OUTOF10: 9
PAINLEVEL_OUTOF10: 9

## 2017-11-10 NOTE — PROGRESS NOTES
Jerilyn Walton Fall Risk Assessment:     Last Known Fall: No falls  Mobility: Immobilized/requires assist of one person  Medications: Cardiovascular or central nervous system meds  Mental Status/LOC/Awareness: Awake, alert, and oriented to date, place, and person  Toileting Needs: Use of assistive device (Bedside commode, bedpan, urinal)  Volume/Electrolyte Status: NPO greater than 24 hours, Use of IV fluids/tube feeds, Nausea/vomiting  Communication/Sensory: No deficits  Behavior: Appropriate behavior  Jerilyn Walton Fall Risk Total: 12  Fall Risk Level: MODERATE RISK    Universal Fall Precautions:  bed in low position and locked, call light/belongings in reach, siderails up x 2, use non-slip footwear, adequate lighting, clutter free and spill free environment, educate on level of risk, educate to call for assistance    Fall Risk Level Interventions:    TRIAL (TELE 8, NEURO, MED ROBERT 5) Moderate Fall Risk Interventions  Place yellow fall risk ID band on patient: completed  Provide patient/family education based on risk assessment : completed  Educate patient/family to call staff for assistance when getting out of bed: completed  Place fall precaution signage outside patient door: completed  Utilize bed/chair fall alarm: completed     Patient Specific Interventions:     Medication: review medications with patient and family and assess for medications that can be discontinued or dosage decreased  Mental Status/LOC/Awareness: reinforce falls education, check on patient hourly, utilize bed/chair fall alarm and reinforce the use of call light  Toileting: provide frquent toileting  Volume/Electrolyte Status: ensure patient remains hydrated, monitor blood sugars and maintain appropriate blood sugar levels if diabetic, administer medications as ordered for nausea and vomiting and ensure IV fluids are appropriate  Communication/Sensory: update plan of care on whiteboard  Behavioral: administer medication as ordered and  instruct/reinforce fall program rationale  Mobility: utilize bed/chair fall alarm and ensure bed is locked and in lowest position

## 2017-11-10 NOTE — PROGRESS NOTES
Monitor Summary:     SR - SVT: 88 - 170's (sustaining, asymptomatic).  Rare PAC's.  Rare PVC's.   .18/.10/.32

## 2017-11-10 NOTE — FACE TO FACE
Face to Face Note  -  Durable Medical Equipment    Ute Cooper M.D. - NPI: [id number]  I certify that this patient is under my care and that they had a durable medical equipment(DME)face to face encounter by myself that meets the physician DME face-to-face encounter requirements with this patient on:    Date of encounter:   Patient:                    MRN:                       YOB: 2018  Alis Sparks  [id number]  1990     The encounter with the patient was in whole, or in part, for the following medical condition, which is the primary reason for durable medical equipment:  Post-Op Surgery    I certify that, based on my findings, the following durable medical equipment is medically necessary:  Oxygen.    HOME O2 Saturation Measurements:(Values must be present for Home Oxygen orders)  Room air sat at rest: 85  Room air sat with amb: 87  With liters of O2: 2, O2 sat at rest with O2: 93  With Liters of O2: 2, O2 sat with amb with O2 : 91  Is the patient mobile?: Yes    My Clinical findings support the need for the above equipment due to:  Hypoxia    Supporting Symptoms: hypoxia on room air

## 2017-11-10 NOTE — DISCHARGE SUMMARY
CHIEF COMPLAINT ON ADMISSION  Chief Complaint   Patient presents with   • Sore Throat     Since sunday.  Painful swallowing.    • High Blood Sugar     397 in triage.        CODE STATUS  Full Code    HPI & HOSPITAL COURSE  This is a 27 y.o. female here with left-sided neck pain and swelling. Patient was found to have a mandibular abscess as well as multiple infected teeth. The patient ended up having 8 teeth removed as well as the abscess evacuated. Since she is diabetic type I the patient also ended up with a diabetic ketoacidosis which had to be urgently managed with fluids as well as electrolyte balance and insulin. Patient at this point has recovered nicely since surgical intervention and evacuation of the abscess. Patient this point will be able to discharge home with outpatient antibiotics and pain management. Patient at this point because of the postsurgical swelling is also requiring higher amounts of oxygen at this point will be referred to oxygen management as an outpatient.    There fore, she is discharged in good and stable condition with close outpatient follow-up.    SPECIFIC OUTPATIENT FOLLOW-UP  Follow-up with oral surgery in 7-10 days  Follow-up with the primary care physician in one to 2 weeks    DISCHARGE PROBLEM LIST  Principal Problem:    Mandibular abscess POA: Yes  Active Problems:    Hyperglycemia due to type 1 diabetes mellitus (CMS-HCC) POA: Yes    Thrombocytopenia (CMS-HCC) POA: Yes    Gastroparesis due to DM (CMS-[facility]) POA: Yes    GERD (gastroesophageal reflux disease) POA: Yes    Sore throat POA: Yes  Resolved Problems:    * No resolved hospital problems. *      FOLLOW UP  No future appointments.  Navi Huber M.D.  [address] [address]  [phone]    Go on 11/16/2017  PLEASE ARRIVE AT 11:50AM FOR A 12:00PM APPOINTMENT. THANK YOU      MEDICATIONS ON DISCHARGE   Alis Sparks   Home Medication Instructions MELISSA:[id number]    Printed on:11/10/17 0902    Medication Information                      atorvastatin (LIPITOR) 20 MG Tab  Take 1 Tab by mouth every day.             clindamycin (CLEOCIN) 300 MG Cap  Take 1 Cap by mouth 3 times a day for 10 days.             insulin aspart (NOVOLOG) 100 UNIT/ML Solution  Inject 1-3 Units as instructed 3 times a day before meals. Sliding scale insulin   150 = 1 unit  151 - 200 = 2 units  201 - 250 = 3 units             insulin glargine (LANTUS) 100 UNIT/ML Solution  Inject 35 Units as instructed 2 times a day.             metoclopramide (REGLAN) 5 MG/5ML Solution  Take 10 mg by mouth 3 times a day.             oxycodone-acetaminophen (PERCOCET) 5-325 MG Tab  Take 1-2 Tabs by mouth every four hours as needed for Moderate Pain or Severe Pain.                 DIET  Orders Placed This Encounter   Procedures   • DIET ORDER     Standing Status:   Standing     Number of Occurrences:   1     Order Specific Question:   Diet:     Answer:   Diabetic [3]     Order Specific Question:   Diet:     Answer:   Full Liquid [11]       ACTIVITY  As tolerated.  Weight bearing as tolerated      CONSULTATIONS  Oral surgery Dr. Young.    PROCEDURES  Extraction of teeth #1, 3, 4, 5, 12, 13, 16, and 18  Incision and drainage of the left sublingual space for abscess drainage. Both of these procedures were done on 11/8/2017    LABORATORY  Lab Results   Component Value Date/Time    SODIUM 133 (L) 11/09/2017 12:11 AM    POTASSIUM 4.2 11/09/2017 12:11 AM    CHLORIDE 106 11/09/2017 12:11 AM    CO2 11 (L) 11/09/2017 12:11 AM    GLUCOSE 305 (H) 11/09/2017 12:11 AM    BUN 9 11/09/2017 12:11 AM    CREATININE 0.55 11/09/2017 12:11 AM        Lab Results   Component Value Date/Time    WBC 5.6 11/09/2017 12:11 AM    HEMOGLOBIN 11.7 (L) 11/09/2017 12:11 AM    HEMATOCRIT 37.6 11/09/2017 12:11 AM    PLATELETCT 149 (L) 11/09/2017 12:11 AM        Total time of the discharge process exceeds 45 minutes

## 2017-11-10 NOTE — DISCHARGE PLANNING
Received choice form from [facility] at 1508.  Referral sent to [facility] [place] at [phone] on 11-10-17.

## 2017-11-10 NOTE — CARE PLAN
Problem: Venous Thromboembolism (VTW)/Deep Vein Thrombosis (DVT) Prevention:  Goal: Patient will participate in Venous Thrombosis (VTE)/Deep Vein Thrombosis (DVT)Prevention Measures  Pt chair-fast due to unstable pain-related condition. On heparin for VTE prophylaxis.    Problem: Pain Management  Goal: Pain level will decrease to patient's comfort goal  Pt's pain adequately managed with dilaudid and percocet PO. Pt tolerated PO med well.

## 2017-11-10 NOTE — PROGRESS NOTES
Oral Surgery Progress  POD #2 s/p incision and drainage of a left sublingual space abscess and extraction of decayed and infected teeth.  She is doing much better.  I discussed her case with Dr. Cooper today.  He feels that she is ready to be discharged home with her mother, who will be caring for her.  I feel that this is a reasonable plan.  She can follow up with me as an outpatient, but should also see a general dentist for caries control and reconstruction of her dentition with removable prostheses and/or crown and bridgework.      Call if questions or concerns.      Osman Young DDS, MD

## 2017-11-10 NOTE — PROGRESS NOTES
Notified Dr. Perez of pt's sustaining HR of 170. Per Dr. Perez, give 5 mg Lopressor IV, and repeat in 5 minutes if pt's HR is above 120. Orders placed.

## 2017-11-10 NOTE — DISCHARGE PLANNING
Care Transition Team Discharge Planning    Anticipated Discharge Information  Anticipated discharge disposition: Home              Discharge Plan:  SW met with pt at bedside and she made choice for [facility] for home oxygen. Choice form faxed to [facility].

## 2017-11-10 NOTE — THERAPY
"  Speech Language Therapy Clinical Swallow Evaluation completed.  Functional Status: Pt AOx4 and cooperative. Decreased ROM noted with oral structures d/t L neck swelling. Pt tolerating full liquid diet.    Recommendations - Diet: Full liquid                          Strategies: Monitor during meals and Head of Bed at 90 Degrees                          Medication Administration: Float Whole with Puree, Whole with Liquid Wash  Plan of Care: Will benefit from Speech Therapy 3 times per week  Post-Acute Therapy: Discharge to a transitional care facility for continued skilled therapy services.    See \"Rehab Therapy-Acute\" Patient Summary Report for complete documentation.   "

## 2017-11-10 NOTE — DISCHARGE INSTRUCTIONS
Discharge Instructions    Discharged to home by car with relative. Discharged via wheelchair, hospital escort: Yes.  Special equipment needed: Not Applicable    Diet 200 ADA full liquid, advance slowly as tolerated to regular diet with 9-13 servings of fruits and vegetables   Activities as tolerated   Follow ups with PCP in 7-10 days, call for appointment   Meds per med rec sheet   No smoking, no alcohol, no caffeine   Wear seat belt in motorized vehicle   Take medications as perscribed   Keep appointments   If symptoms worsen call PCP, 911 or urgent care.    Be sure to schedule a follow-up appointment with your primary care doctor or any specialists as instructed.     Discharge Plan:   Influenza Vaccine Indication: Indicated: 9 to 64 years of age  Influenza Vaccine Given - only chart on this line when given: Influenza Vaccine Given (See MAR)    I understand that a diet low in cholesterol, fat, and sodium is recommended for good health. Unless I have been given specific instructions below for another diet, I accept this instruction as my diet prescription.   Other diet: Diabetic diet, full liquid, advance slowly as tolerated to regular diet with 9-13 servings of fruits and vegetables   Activities as tolerated     Special Instructions: None    · Is patient discharged on Warfarin / Coumadin?   No     · Is patient Post Blood Transfusion?  No    Depression / Suicide Risk    As you are discharged from this [facility] facility, it is important to learn how to keep safe from harming yourself.    Recognize the warning signs:  · Abrupt changes in personality, positive or negative- including increase in energy   · Giving away possessions  · Change in eating patterns- significant weight changes-  positive or negative  · Change in sleeping patterns- unable to sleep or sleeping all the time   · Unwillingness or inability to communicate  · Depression  · Unusual sadness, discouragement and loneliness  · Talk of wanting to  die  · Neglect of personal appearance   · Rebelliousness- reckless behavior  · Withdrawal from people/activities they love  · Confusion- inability to concentrate     If you or a loved one observes any of these behaviors or has concerns about self-harm, here's what you can do:  · Talk about it- your feelings and reasons for harming yourself  · Remove any means that you might use to hurt yourself (examples: pills, rope, extension cords, firearm)  · Get professional help from the community (Mental Health, Substance Abuse, psychological counseling)  · Do not be alone:Call your Safe Contact- someone whom you trust who will be there for you.  · Call your local CRISIS HOTLINE 090-1695 or 258-864-5074  · Call your local Children's Mobile Crisis Response Team [place] (669) 353-4188 or www.[employer]  · Call the toll free National Suicide Prevention Hotlines   · National Suicide Prevention Lifeline 150-276-KMDO (5390)  · [employer] [facility] 800-SUICIDE (265-1799)    Clindamycin capsules  What is this medicine?  CLINDAMYCIN (KLIN da MYE sin) is a lincosamide antibiotic. It is used to treat certain kinds of bacterial infections. It will not work for colds, flu, or other viral infections.  This medicine may be used for other purposes; ask your health care provider or pharmacist if you have questions.  COMMON BRAND NAME(S): Cleocin  What should I tell my health care provider before I take this medicine?  They need to know if you have any of these conditions:  -kidney disease  -liver disease  -stomach problems like colitis  -an unusual or allergic reaction to clindamycin, lincomycin, or other medicines, foods, dyes like tartrazine or preservatives  -pregnant or trying to get pregnant  -breast-feeding  How should I use this medicine?  Take this medicine by mouth with a full glass of water. Follow the directions on the prescription label. You can take this medicine with food or on an empty stomach. If the medicine  upsets your stomach, take it with food. Take your medicine at regular intervals. Do not take your medicine more often than directed. Take all of your medicine as directed even if you think your are better. Do not skip doses or stop your medicine early.  Talk to your pediatrician regarding the use of this medicine in children. Special care may be needed.  Overdosage: If you think you have taken too much of this medicine contact a poison control center or emergency room at once.  NOTE: This medicine is only for you. Do not share this medicine with others.  What if I miss a dose?  If you miss a dose, take it as soon as you can. If it is almost time for your next dose, take only that dose. Do not take double or extra doses.  What may interact with this medicine?  -chloramphenicol  -erythromycin  -kaolin products  This list may not describe all possible interactions. Give your health care provider a list of all the medicines, herbs, non-prescription drugs, or dietary supplements you use. Also tell them if you smoke, drink alcohol, or use illegal drugs. Some items may interact with your medicine.  What should I watch for while using this medicine?  Tell your doctor or healthcare professional if your symptoms do not start to get better or if they get worse.  Do not treat diarrhea with over the counter products. Contact your doctor if you have diarrhea that lasts more than 2 days or if it is severe and watery.  What side effects may I notice from receiving this medicine?  Side effects that you should report to your doctor or health care professional as soon as possible:  -allergic reactions like skin rash, itching or hives, swelling of the face, lips, or tongue  -dark urine  -pain on swallowing  -redness, blistering, peeling or loosening of the skin, including inside the mouth  -unusual bleeding or bruising  -unusually weak or tired  -yellowing of eyes or skin  Side effects that usually do not require medical attention  (report to your doctor or health care professional if they continue or are bothersome):  -diarrhea  -itching in the rectal or genital area  -joint pain  -nausea, vomiting  -stomach pain  This list may not describe all possible side effects. Call your doctor for medical advice about side effects. You may report side effects to FDA at 0-557-FDA-4058.  Where should I keep my medicine?  Keep out of the reach of children.  Store at room temperature between 20 and 25 degrees C (68 and 77 degrees F). Throw away any unused medicine after the expiration date.  NOTE: This sheet is a summary. It may not cover all possible information. If you have questions about this medicine, talk to your doctor, pharmacist, or health care provider.  © 2014, [employer] Standard. (5/11/2011 10:12:31 AM)    Acetaminophen; Oxycodone tablets  What is this medicine?  ACETAMINOPHEN; OXYCODONE (a set a AYDIN jeremy fen; ox i KOE done) is a pain reliever. It is used to treat mild to moderate pain.  This medicine may be used for other purposes; ask your health care provider or pharmacist if you have questions.  COMMON BRAND NAME(S): Endocet, Magnacet, Narvox, Percocet, Perloxx, Primalev, Primlev, Roxicet, Xolox  What should I tell my health care provider before I take this medicine?  They need to know if you have any of these conditions:  -brain tumor  -Crohn's disease, inflammatory bowel disease, or ulcerative colitis  -drink more than 3 alcohol containing drinks per day  -drug abuse or addiction  -head injury  -heart or circulation problems  -kidney disease or problems going to the bathroom  -liver disease  -lung disease, asthma, or breathing problems  -an unusual or allergic reaction to acetaminophen, oxycodone, other opioid analgesics, other medicines, foods, dyes, or preservatives  -pregnant or trying to get pregnant  -breast-feeding  How should I use this medicine?  Take this medicine by mouth with a full glass of water. Follow the directions on  the prescription label. Take your medicine at regular intervals. Do not take your medicine more often than directed.  Talk to your pediatrician regarding the use of this medicine in children. Special care may be needed.  Patients over 65 years old may have a stronger reaction and need a smaller dose.  Overdosage: If you think you have taken too much of this medicine contact a poison control center or emergency room at once.  NOTE: This medicine is only for you. Do not share this medicine with others.  What if I miss a dose?  If you miss a dose, take it as soon as you can. If it is almost time for your next dose, take only that dose. Do not take double or extra doses.  What may interact with this medicine?  -alcohol  -antihistamines  -barbiturates like amobarbital, butalbital, butabarbital, methohexital, pentobarbital, phenobarbital, thiopental, and secobarbital  -benztropine  -drugs for bladder problems like solifenacin, trospium, oxybutynin, tolterodine, hyoscyamine, and methscopolamine  -drugs for breathing problems like ipratropium and tiotropium  -drugs for certain stomach or intestine problems like propantheline, homatropine methylbromide, glycopyrrolate, atropine, belladonna, and dicyclomine  -general anesthetics like etomidate, ketamine, nitrous oxide, propofol, desflurane, enflurane, halothane, isoflurane, and sevoflurane  -medicines for depression, anxiety, or psychotic disturbances  -medicines for sleep  -muscle relaxants  -naltrexone  -narcotic medicines (opiates) for pain  -phenothiazines like perphenazine, thioridazine, chlorpromazine, mesoridazine, fluphenazine, prochlorperazine, promazine, and trifluoperazine  -scopolamine  -tramadol  -trihexyphenidyl  This list may not describe all possible interactions. Give your health care provider a list of all the medicines, herbs, non-prescription drugs, or dietary supplements you use. Also tell them if you smoke, drink alcohol, or use illegal drugs. Some items  may interact with your medicine.  What should I watch for while using this medicine?  Tell your doctor or health care professional if your pain does not go away, if it gets worse, or if you have new or a different type of pain. You may develop tolerance to the medicine. Tolerance means that you will need a higher dose of the medication for pain relief. Tolerance is normal and is expected if you take this medicine for a long time.  Do not suddenly stop taking your medicine because you may develop a severe reaction. Your body becomes used to the medicine. This does NOT mean you are addicted. Addiction is a behavior related to getting and using a drug for a non-medical reason. If you have pain, you have a medical reason to take pain medicine. Your doctor will tell you how much medicine to take. If your doctor wants you to stop the medicine, the dose will be slowly lowered over time to avoid any side effects.  You may get drowsy or dizzy. Do not drive, use machinery, or do anything that needs mental alertness until you know how this medicine affects you. Do not stand or sit up quickly, especially if you are an older patient. This reduces the risk of dizzy or fainting spells. Alcohol may interfere with the effect of this medicine. Avoid alcoholic drinks.  There are different types of narcotic medicines (opiates) for pain. If you take more than one type at the same time, you may have more side effects. Give your health care provider a list of all medicines you use. Your doctor will tell you how much medicine to take. Do not take more medicine than directed. Call emergency for help if you have problems breathing.  The medicine will cause constipation. Try to have a bowel movement at least every 2 to 3 days. If you do not have a bowel movement for 3 days, call your doctor or health care professional.  Do not take Tylenol (acetaminophen) or medicines that have acetaminophen with this medicine. Too much acetaminophen can be  very dangerous. Many nonprescription medicines contain acetaminophen. Always read the labels carefully to avoid taking more acetaminophen.  What side effects may I notice from receiving this medicine?  Side effects that you should report to your doctor or health care professional as soon as possible:  -allergic reactions like skin rash, itching or hives, swelling of the face, lips, or tongue  -breathing difficulties, wheezing  -confusion  -light headedness or fainting spells  -severe stomach pain  -yellowing of the skin or the whites of the eyes  Side effects that usually do not require medical attention (report to your doctor or health care professional if they continue or are bothersome):  -dizziness  -drowsiness  -nausea  -vomiting  This list may not describe all possible side effects. Call your doctor for medical advice about side effects. You may report side effects to FDA at 3-334-FDA-7361.  Where should I keep my medicine?  Keep out of the reach of children. This medicine can be abused. Keep your medicine in a safe place to protect it from theft. Do not share this medicine with anyone. Selling or giving away this medicine is dangerous and against the law.  Store at room temperature between 20 and 25 degrees C (68 and 77 degrees F). Keep container tightly closed. Protect from light.   Discard unused medicine and used packaging carefully. Pets and children can be harmed if they find used or lost packages. Flush any unused medicines down the toilet. Do not use the medicine after the expiration date.  NOTE: This sheet is a summary. It may not cover all possible information. If you have questions about this medicine, talk to your doctor, pharmacist, or health care provider.  © 2014, [employer]vier/[employer] Standard. (8/28/2012 4:13:41 PM)    2000 Calorie Diabetic Diet  The 2000 calorie diabetic diet is designed for eating up to 2000 calories each day. Following this diet and making healthy meal choices can help improve  overall health. It controls blood glucose (sugar) levels. It can also lower blood pressure and cholesterol.  SERVING SIZES  Measuring foods and serving sizes helps to make sure you are getting the right amount of food. The list below tells how big or small some common serving sizes are.  · 1 oz.........4 stacked dice.   · 3 oz.........Deck of cards.   · 1 tsp........Tip of little finger.   · 1 tbs........Thumb.   · 2 tbs........Golf ball.   · ½ cup.......Half of a fist.   · 1 cup........A fist.   GUIDELINES FOR CHOOSING FOODS  The goal of this diet is to eat a variety of foods and limit calories to 2000 each day. This can be done by choosing foods that are low in calories and fat. The diet also suggests eating small amounts of food often. Doing this helps control your blood glucose levels so they do not get too high or too low. Each meal or snack should contain a protein food source to help you feel more satisfied and to stabilize your blood glucose. Try to eat about the same amount of food around the same time each day. This includes weekend days, travel days, and days off work. Space your meals about 4 to 5 hours apart and add a snack between them if you wish.  For example, a daily food plan could include breakfast, a morning snack, lunch, dinner, and an evening snack. Healthy meals and snacks include whole grains, vegetables, fruits, lean meats, poultry, fish, and dairy products. As you plan your meals, choose a variety of foods. Choose from the bread and starches, vegetables, fruit, dairy, and meat/protein groups. Examples of foods from each group are listed below with their suggested serving sizes. Use measuring cups and spoons to become familiar with what a healthy portion looks like.  Bread and Starches  Each serving equals 15 grams of carbohydrates.  · 1 slice bread.   · ¼ bagel.   · ¾ cup or 1 cup cold cereal (unsweetened).   · ½ cup hot cereal or mashed potatoes.   · 1 small potato (size of a computer  mouse).   ·  cup cooked pasta or rice.   · ½ English muffin.   · 1 cup broth-based soup.   · 3 cups popcorn.   · 4 to 6 whole-wheat crackers.   · ½ cup cooked beans, peas, or corn.   Vegetables  Each serving equals 5 grams of carbohydrates.  · ½ cup cooked vegetables.   · 1 cup raw vegetables.   · ½ cup tomato juice.   Fruit  Each serving equals 15 grams of carbohydrates.  · 1 small apple, banana, or orange.   · 1 ¼ cup watermelon or strawberries.   · ½ cup applesauce (no sugar added).   · 2 tbs raisins.   · ½ banana.   · ½ cup unsweetened canned fruit.   · ½ cup unsweetened fruit juice.   Dairy  Each serving equals 12 to 15 grams of carbohydrates.  · 1 cup fat-free milk.   · 6 oz artificially sweetened yogurt.   · 1 cup buttermilk.   · 1 cup soy milk.   Meat/Protein  · 1 large egg.   · 2 to 3 oz meat, poultry, or fish.   · ½ cup cottage cheese.   · 1 tbs peanut butter.   · ½ cup tofu.   · 1 oz cheese.   · ¼ cup tuna canned in water.   SAMPLE 2000 CALORIE DIET PLAN  Breakfast  · 1 English muffin (2 carb servings).   · Reduced fat cream cheese, 1 tbs.   · 1 scrambled egg.   · ½ grapefruit (1 carb serving).   · Fat-free milk, 1 cup (1 carb serving).   Morning Snack  · Artificially sweetened yogurt, 6 oz (1 carb serving).   · 2 tbs chopped nuts.   · 1 small peach (1 carb serving).   Lunch  · Grilled chicken sandwich.   · 1 hamburger bun (2 carb servings).   · 2 oz chicken breast.   · 1 lettuce leaf.   · 2 slices tomato.   · Reduced fat mayonnaise, 1 tbs.   · Carrot sticks, 1 cup.   · Celery, 1 cup.   · 1 small apple (1 carb serving).   · Fat-free milk, 1 cup (1 carb serving).   Afternoon Snack  · ½ cup low-fat cottage cheese.   · 1 ¼ cups strawberries (1 carb serving).   Dinner  · Steak fajitas.   · 2 oz lean steak.   · 1 whole-wheat tortilla, 8 inches (1 ½ carb servings).   · Shredded lettuce, ¼ cup.   · 2 slices tomato.   · Salsa, ¼ cup.   · Low-fat sour cream, 2 tbs.   · Brown rice,  cup (1 carb serving).   · 1  small orange (1 carb serving).   Evening Snack  · 4 reduced fat whole-wheat crackers (1 carb serving).   · 1 tbs peanut butter.   · 12 to 15 grapes (1 carb serving).   MEAL PLAN  Use this worksheet to help you make a daily meal plan based on the 2000 calorie diabetic diet suggestions. The total amount of carbohydrates in your meal or snack is more important than making sure you include all of the food groups at every meal or snack. If you are using this plan to help you control your blood glucose, you may interchange carbohydrate containing foods (dairy, starches, and fruits). Choose a variety of fresh foods of varying colors and flavors. You can choose from the following foods to build your day's meals:  · 11 Starches.   · 4 Vegetables.   · 3 Fruits.   · 3 Dairy.   · 8 oz Meat.   · Up to 6 Fats.   Your dietician can use this worksheet to help you decide how many servings and what types of foods are right for you.  BREAKFAST  Food Group and Servings / Food Choice  Starches ___________________________________________  Dairy ______________________________________________  Fruit ______________________________________________  Meat ______________________________________________  Fat________________________________________________  LUNCH  Food Group and Servings / Food Choice  Starch _____________________________________________  Meat ______________________________________________  Vegetables _________________________________________  Fruit ______________________________________________  Dairy______________________________________________  Fat________________________________________________  AFTERNOON SNACK  Food Group and Servings / Food Choice  Starch________________________________________________  Meat_________________________________________________  Fruit__________________________________________________  DINNER  Food Group and Servings / Food Choice  Starches ____________________________________________  Meat  _______________________________________________  Dairy _______________________________________________  Vegetables __________________________________________  Fruit ________________________________________________  Fat_________________________________________________  EVENING SNACK  Food Group and Servings / Food Choice  Fruit _______________________________________________  Meat _______________________________________________  Starch ______________________________________________  DAILY TOTALS  Starches ________________________  Vegetables ______________________  Fruit ___________________________  Dairy ___________________________  Meat ___________________________  Fat _____________________________  Document Released: 07/10/2006 Document Revised: 03/11/2013 Document Reviewed: 07/26/2010  ExitCare® Patient Information ©2013 [facility], [facility].

## 2017-11-10 NOTE — PROGRESS NOTES
Received report from day shift RN, assumed pt care. A&O x 4. Fall precautions in place. Call light and bedside table within reach. Assessment completed. Updated pt about POC, pt verbalized understanding. Will continue to monitor.

## 2017-11-10 NOTE — DISCHARGE PLANNING
Care Transition Team Assessment    Information Source  Information Given By: Patient  Who is responsible for making decisions for patient? : Patient    Readmission Evaluation  Is this a readmission?: No    Elopement Risk  Legal Hold: No  Ambulatory or Self Mobile in Wheelchair: Yes  Disoriented: No  Psychiatric Symptoms: None  History of Wandering: No  Elopement this Admit: No  Vocalizing Wanting to Leave: No  Displays Behaviors, Body Language Wanting to Leave: No-Not at Risk for Elopement  Elopement Risk: Not at Risk for Elopement    Interdisciplinary Discharge Planning  Does Admitting Nurse Feel This Could be a Complex Discharge?: No  Lives with - Patient's Self Care Capacity: Parents  Patient or legal guardian wants to designate a caregiver (see row info): No  Support Systems: Family Member(s)  Housing / Facility: 2 Story House  Do You Take your Prescribed Medications Regularly: Yes  Able to Return to Previous ADL's: Yes  Mobility Issues: No  Prior Services: None  Patient Expects to be Discharged to:: Home  Assistance Needed: No  Durable Medical Equipment: Not Applicable    Discharge Preparedness  What is your plan after discharge?: Home with help  What are your discharge supports?: Parent  Prior Functional Level: Independent with Activities of Daily Living, Independent with Medication Management  Difficulity with ADLs: None  Difficulity with IADLs: None    Functional Assesment  Prior Functional Level: Independent with Activities of Daily Living, Independent with Medication Management    Finances  Financial Barriers to Discharge: No  Prescription Coverage: Yes    Vision / Hearing Impairment  Vision Impairment : No  Hearing Impairment : No    Values / Beliefs / Concerns  Values / Beliefs Concerns : No    Advance Directive  Advance Directive?: None    Domestic Abuse  Have you ever been the victim of abuse or violence?: No  Physical Abuse or Sexual Abuse: No  Verbal Abuse or Emotional Abuse: No  Possible Abuse  Reported to:: Not Applicable    Psychological Assessment  History of Substance Abuse: None  History of Psychiatric Problems: No  Non-compliant with Treatment: No  Newly Diagnosed Illness: No    Discharge Risks or Barriers  Discharge risks or barriers?: No PCP    Anticipated Discharge Information  Anticipated discharge disposition: Home

## 2017-11-11 LAB
BACTERIA WND AEROBE CULT: NORMAL
GRAM STN SPEC: NORMAL
SIGNIFICANT IND 70042: NORMAL
SITE SITE: NORMAL
SOURCE SOURCE: NORMAL

## 2017-11-11 NOTE — PROGRESS NOTES
Per Dr. Cooper, pt okay to advance to full liquid diet. Full liquid diet ordered.     Also per Dr. Cooper and Dr. Young (oral surgeon) okay for nurse to pull penrose drain. Procedure explained to patient and drain pulled. Dry gauze and transparent film applied to site

## 2017-11-11 NOTE — PROGRESS NOTES
Nurse notified MD about patient O2 desating to 83% while asleep and ongoing tachycardia. Per Md, do walking desat test and watch tachycardia. Walking desat test done and numbers entered into EPIC. MD and  notified.

## 2017-11-11 NOTE — PROGRESS NOTES
Oral surgeon, Dr. LISSETTE Young contact information provided. Instructed to follow-up within 7-10 days.    Copy of paperwork placed in chart.

## 2017-11-11 NOTE — PROGRESS NOTES
"Report received at bedside and assumed care at this time. Patient AxOx4. Resting comfortably in bed. Reports left neck pain as aching, \"6/10\", but tolerable. Incision dressing changed - serosang discharge noted. Nasal cannula - 2L/min continued while sleeping/comfort. Patient denies SOB and difficulty swallowing. Heart monitor discontinued - sinus tachycardia prior. Left AC IV and right hand IV - discontinued. Bedtime medications given. Discharge paperwork, education and prescriptions given. Patient waiting on mother to pick-up/provide ride home. Verified call-light within reach and bed wheels locked. Instructed patient to call for assistance PRN. Hourly rounding performed and will continue to monitor.   "

## 2017-11-11 NOTE — DISCHARGE PLANNING
Spoke with Nima at [facility], they have accepted patient and should have transport tank bedside in about 30 minutes,

## 2017-11-12 LAB
BACTERIA SPEC ANAEROBE CULT: NORMAL
SIGNIFICANT IND 70042: NORMAL
SITE SITE: NORMAL
SOURCE SOURCE: NORMAL

## 2017-11-21 NOTE — ADDENDUM NOTE
Encounter addended by: Denise Dominguez R.N. on: 11/20/2017  5:31 PM<BR>    Actions taken: Utilization Review saved, Flowsheet accepted

## 2018-03-15 ENCOUNTER — APPOINTMENT (OUTPATIENT)
Dept: RADIOLOGY | Facility: MEDICAL CENTER | Age: 28
End: 2018-03-15
Attending: EMERGENCY MEDICINE
Payer: MEDICAID

## 2018-03-15 ENCOUNTER — HOSPITAL ENCOUNTER (EMERGENCY)
Facility: MEDICAL CENTER | Age: 28
End: 2018-03-15
Attending: EMERGENCY MEDICINE
Payer: MEDICAID

## 2018-03-15 VITALS
BODY MASS INDEX: 31.49 KG/M2 | TEMPERATURE: 97.7 F | HEART RATE: 101 BPM | RESPIRATION RATE: 16 BRPM | HEIGHT: 65 IN | DIASTOLIC BLOOD PRESSURE: 88 MMHG | OXYGEN SATURATION: 98 % | SYSTOLIC BLOOD PRESSURE: 120 MMHG | WEIGHT: 189 LBS

## 2018-03-15 DIAGNOSIS — R11.15 NON-INTRACTABLE CYCLICAL VOMITING WITH NAUSEA: ICD-10-CM

## 2018-03-15 LAB
ALBUMIN SERPL BCP-MCNC: 4.1 G/DL (ref 3.2–4.9)
ALBUMIN/GLOB SERPL: 1.4 G/DL
ALP SERPL-CCNC: 119 U/L (ref 30–99)
ALT SERPL-CCNC: 17 U/L (ref 2–50)
ANION GAP SERPL CALC-SCNC: 11 MMOL/L (ref 0–11.9)
APPEARANCE UR: CLEAR
AST SERPL-CCNC: 13 U/L (ref 12–45)
BACTERIA #/AREA URNS HPF: NEGATIVE /HPF
BASOPHILS # BLD AUTO: 0.5 % (ref 0–1.8)
BASOPHILS # BLD: 0.03 K/UL (ref 0–0.12)
BILIRUB SERPL-MCNC: 0.3 MG/DL (ref 0.1–1.5)
BILIRUB UR QL STRIP.AUTO: NEGATIVE
BUN SERPL-MCNC: 24 MG/DL (ref 8–22)
CALCIUM SERPL-MCNC: 9.5 MG/DL (ref 8.5–10.5)
CHLORIDE SERPL-SCNC: 107 MMOL/L (ref 96–112)
CO2 SERPL-SCNC: 21 MMOL/L (ref 20–33)
COLOR UR: YELLOW
CREAT SERPL-MCNC: 0.93 MG/DL (ref 0.5–1.4)
EOSINOPHIL # BLD AUTO: 0.17 K/UL (ref 0–0.51)
EOSINOPHIL NFR BLD: 2.8 % (ref 0–6.9)
EPI CELLS #/AREA URNS HPF: ABNORMAL /HPF
ERYTHROCYTE [DISTWIDTH] IN BLOOD BY AUTOMATED COUNT: 39.9 FL (ref 35.9–50)
GLOBULIN SER CALC-MCNC: 3 G/DL (ref 1.9–3.5)
GLUCOSE SERPL-MCNC: 138 MG/DL (ref 65–99)
GLUCOSE UR STRIP.AUTO-MCNC: 500 MG/DL
HCG SERPL QL: NEGATIVE
HCT VFR BLD AUTO: 43.7 % (ref 37–47)
HGB BLD-MCNC: 14.9 G/DL (ref 12–16)
HYALINE CASTS #/AREA URNS LPF: ABNORMAL /LPF
IMM GRANULOCYTES # BLD AUTO: 0.03 K/UL (ref 0–0.11)
IMM GRANULOCYTES NFR BLD AUTO: 0.5 % (ref 0–0.9)
KETONES UR STRIP.AUTO-MCNC: ABNORMAL MG/DL
LEUKOCYTE ESTERASE UR QL STRIP.AUTO: NEGATIVE
LIPASE SERPL-CCNC: 31 U/L (ref 11–82)
LYMPHOCYTES # BLD AUTO: 2.54 K/UL (ref 1–4.8)
LYMPHOCYTES NFR BLD: 42.5 % (ref 22–41)
MCH RBC QN AUTO: 28.3 PG (ref 27–33)
MCHC RBC AUTO-ENTMCNC: 34.1 G/DL (ref 33.6–35)
MCV RBC AUTO: 82.9 FL (ref 81.4–97.8)
MICRO URNS: ABNORMAL
MONOCYTES # BLD AUTO: 0.44 K/UL (ref 0–0.85)
MONOCYTES NFR BLD AUTO: 7.4 % (ref 0–13.4)
NEUTROPHILS # BLD AUTO: 2.77 K/UL (ref 2–7.15)
NEUTROPHILS NFR BLD: 46.3 % (ref 44–72)
NITRITE UR QL STRIP.AUTO: NEGATIVE
NRBC # BLD AUTO: 0 K/UL
NRBC BLD-RTO: 0 /100 WBC
PH UR STRIP.AUTO: 5.5 [PH]
PLATELET # BLD AUTO: 295 K/UL (ref 164–446)
PMV BLD AUTO: 9.9 FL (ref 9–12.9)
POTASSIUM SERPL-SCNC: 3.5 MMOL/L (ref 3.6–5.5)
PROT SERPL-MCNC: 7.1 G/DL (ref 6–8.2)
PROT UR QL STRIP: 100 MG/DL
RBC # BLD AUTO: 5.27 M/UL (ref 4.2–5.4)
RBC # URNS HPF: ABNORMAL /HPF
RBC UR QL AUTO: NEGATIVE
SODIUM SERPL-SCNC: 139 MMOL/L (ref 135–145)
SP GR UR REFRACTOMETRY: >1.05
UROBILINOGEN UR STRIP.AUTO-MCNC: 0.2 MG/DL
WBC # BLD AUTO: 6 K/UL (ref 4.8–10.8)
WBC #/AREA URNS HPF: ABNORMAL /HPF

## 2018-03-15 PROCEDURE — 700117 HCHG RX CONTRAST REV CODE 255: Performed by: EMERGENCY MEDICINE

## 2018-03-15 PROCEDURE — 85025 COMPLETE CBC W/AUTO DIFF WBC: CPT

## 2018-03-15 PROCEDURE — 700105 HCHG RX REV CODE 258: Performed by: EMERGENCY MEDICINE

## 2018-03-15 PROCEDURE — 81001 URINALYSIS AUTO W/SCOPE: CPT

## 2018-03-15 PROCEDURE — 74177 CT ABD & PELVIS W/CONTRAST: CPT

## 2018-03-15 PROCEDURE — 83690 ASSAY OF LIPASE: CPT

## 2018-03-15 PROCEDURE — 80053 COMPREHEN METABOLIC PANEL: CPT

## 2018-03-15 PROCEDURE — 84703 CHORIONIC GONADOTROPIN ASSAY: CPT

## 2018-03-15 PROCEDURE — 700111 HCHG RX REV CODE 636 W/ 250 OVERRIDE (IP): Performed by: EMERGENCY MEDICINE

## 2018-03-15 PROCEDURE — 99284 EMERGENCY DEPT VISIT MOD MDM: CPT

## 2018-03-15 RX ORDER — FERROUS SULFATE 325(65) MG
325 TABLET ORAL DAILY
Status: ON HOLD | COMMUNITY
End: 2019-01-21

## 2018-03-15 RX ORDER — ONDANSETRON 4 MG/1
4 TABLET, ORALLY DISINTEGRATING ORAL EVERY 6 HOURS PRN
Qty: 10 TAB | Refills: 1 | Status: SHIPPED | OUTPATIENT
Start: 2018-03-15 | End: 2018-04-01

## 2018-03-15 RX ORDER — SODIUM CHLORIDE 9 MG/ML
1000 INJECTION, SOLUTION INTRAVENOUS ONCE
Status: COMPLETED | OUTPATIENT
Start: 2018-03-15 | End: 2018-03-15

## 2018-03-15 RX ORDER — ONDANSETRON 4 MG/1
4 TABLET, ORALLY DISINTEGRATING ORAL ONCE
Status: COMPLETED | OUTPATIENT
Start: 2018-03-15 | End: 2018-03-15

## 2018-03-15 RX ADMIN — ONDANSETRON 4 MG: 4 TABLET, ORALLY DISINTEGRATING ORAL at 13:40

## 2018-03-15 RX ADMIN — IOHEXOL 100 ML: 350 INJECTION, SOLUTION INTRAVENOUS at 15:39

## 2018-03-15 RX ADMIN — SODIUM CHLORIDE 1000 ML: 9 INJECTION, SOLUTION INTRAVENOUS at 13:40

## 2018-03-15 ASSESSMENT — PAIN SCALES - GENERAL: PAINLEVEL_OUTOF10: 8

## 2018-03-15 NOTE — ED TRIAGE NOTES
Chief Complaint   Patient presents with   • Fatigue   • Nausea   • Abdominal Pain     RLQ, LUQ, epigastric     Symptoms x 2 weeks. Pt was sent from [facility] where she was seeing her pcp. FSBS [id number].

## 2018-03-15 NOTE — DISCHARGE INSTRUCTIONS
Nausea and Vomiting, Adult  Introduction  Feeling sick to your stomach (nausea) means that your stomach is upset or you feel like you have to throw up (vomit). Feeling more and more sick to your stomach can lead to throwing up. Throwing up happens when food and liquid from your stomach are thrown up and out the mouth. Throwing up can make you feel weak and cause you to get dehydrated. Dehydration can make you tired and thirsty, make you have a dry mouth, and make it so you pee (urinate) less often. Older adults and people with other diseases or a weak defense system (immune system) are at higher risk for dehydration. If you feel sick to your stomach or if you throw up, it is important to follow instructions from your doctor about how to take care of yourself.  Follow these instructions at home:  Eating and drinking  Follow these instructions as told by your doctor:  · Take an oral rehydration solution (ORS). This is a drink that is sold at pharmacies and stores.  · Drink clear fluids in small amounts as you are able, such as:  ¨ Water.  ¨ Ice chips.  ¨ Diluted fruit juice.  ¨ Low-calorie sports drinks.  · Eat bland, easy-to-digest foods in small amounts as you are able, such as:  ¨ Bananas.  ¨ Applesauce.  ¨ Rice.  ¨ Low-fat (lean) meats.  ¨ Toast.  ¨ Crackers.  · Avoid fluids that have a lot of sugar or caffeine in them.  · Avoid alcohol.  · Avoid spicy or fatty foods.  General instructions  · Drink enough fluid to keep your pee (urine) clear or pale yellow.  · Wash your hands often. If you cannot use soap and water, use hand .  · Make sure that all people in your home wash their hands well and often.  · Take over-the-counter and prescription medicines only as told by your doctor.  · Rest at home while you get better.  · Watch your condition for any changes.  · Breathe slowly and deeply when you feel sick to your stomach.  · Keep all follow-up visits as told by your doctor. This is important.  Contact a  doctor if:  · You have a fever.  · You cannot keep fluids down.  · Your symptoms get worse.  · You have new symptoms.  · You feel sick to your stomach for more than two days.  · You feel light-headed or dizzy.  · You have a headache.  · You have muscle cramps.  Get help right away if:  · You have pain in your chest, neck, arm, or jaw.  · You feel very weak or you pass out (faint).  · You throw up again and again.  · You see blood in your throw-up.  · Your throw-up looks like black coffee grounds.  · You have bloody or black poop (stools) or poop that look like tar.  · You have a very bad headache, a stiff neck, or both.  · You have a rash.  · You have very bad pain, cramping, or bloating in your belly (abdomen).  · You have trouble breathing.  · You are breathing very quickly.  · Your heart is beating very quickly.  · Your skin feels cold and clammy.  · You feel confused.  · You have pain when you pee.  · You have signs of dehydration, such as:  ¨ Dark pee, hardly any pee, or no pee.  ¨ Cracked lips.  ¨ Dry mouth.  ¨ Sunken eyes.  ¨ Sleepiness.  ¨ Weakness.  These symptoms may be an emergency. Do not wait to see if the symptoms will go away. Get medical help right away. Call your local emergency services (911 in the [place] Do not drive yourself to the hospital.   This information is not intended to replace advice given to you by your health care provider. Make sure you discuss any questions you have with your health care provider.  Document Released: 06/05/2009 Document Revised: 07/07/2017 Document Reviewed: 08/23/2016  © 2017 [employer]

## 2018-03-15 NOTE — ED PROVIDER NOTES
ED Provider Note    CHIEF COMPLAINT  Chief Complaint   Patient presents with   • Fatigue   • Nausea   • Abdominal Pain     RLQ, LUQ, epigastric       HPI  Alis Sparks is a 27 y.o. female who presents for evaluation of severe abdominal pain nausea vomiting fatigue and not feeling well. The patient has no extensive medical history as listed below including type I diabetes, diabetic ketoacidosis gastroparesis. The patient reports 3 days of progressive worsening nausea or vomiting without diarrhea now until aching right lower quadrant pain. She has no abdominal surgical history. She has prior history of kidney stones but denies dysuria. She is not sexually active denies flank pain or hematuria no vaginal bleeding or discharge. She's been diagnosed with ovarian cyst but reports that this pain feels different    REVIEW OF SYSTEMS  See HPI for further details. No high fevers hematemesis hematochezia denies was weight loss All other systems are negative.     PAST MEDICAL HISTORY  Past Medical History:   Diagnosis Date   • Backpain    • Bronchitis    • Diabetes type 1, controlled ([facility])     tests 4-5 times daily   • DKA (diabetic ketoacidoses) ([facility])    • Fall    • Gastroparesis    • Kidney infection    • Pneumonia    • UTI (urinary tract infection)        FAMILY HISTORY  Noncontributory    SOCIAL HISTORY  Social History     Social History   • Marital status: Single     Spouse name: N/A   • Number of children: N/A   • Years of education: N/A     Social History Main Topics   • Smoking status: Never Smoker   • Smokeless tobacco: Never Used   • Alcohol use No   • Drug use: No   • Sexual activity: No     Other Topics Concern   • Not on file     Social History Narrative   • No narrative on file   Nonsmoker not sexually active    SURGICAL HISTORY  Past Surgical History:   Procedure Laterality Date   • SUBMANDIBLE ABSCESS INCISION AND DRAINAGE Left 11/8/2017    Procedure: SUBMANDIBLE ABSCESS INCISION AND  "DRAINAGE;  Surgeon: Osman Young M.D.;  Location: SURGERY [facility];  Service: Dental   • DENTAL EXTRACTION(S)  11/8/2017    Procedure: DENTAL EXTRACTION(S);  Surgeon: Osman Young M.D.;  Location: SURGERY [facility];  Service: Dental   • GASTROSCOPY-ENDO  9/18/2014    Performed by Sylvain PERRY M.D. at ENDOSCOPY [facility]   • GASTROSCOPY WITH BIOPSY  9/18/2014    Performed by Sylvain PERRY M.D. at ENDOSCOPY [facility]   • OTHER      surgery to patch lung after pneumor from central line placement       CURRENT MEDICATIONS  Home Medications     Reviewed by Acacia Jacobs R.N. (Registered Nurse) on 03/15/18 at 1314  Med List Status: Complete   Medication Last Dose Status   atorvastatin (LIPITOR) 20 MG Tab 3/15/2018 Active   Cholecalciferol 2000 UNIT Cap 3/15/2018 Active   ferrous sulfate 325 (65 Fe) MG tablet 3/15/2018 Active   insulin glargine (BASAGLAR KWIKPEN) 100 UNIT/ML Solution Pen-injector injection 3/15/2018 Active   insulin glulisine (APIDRA) 100 UNIT/ML Solution Pen-injector injection 3/15/2018 Active   metoclopramide (REGLAN) 5 MG/5ML Solution 3/15/2018 Active                ALLERGIES  Allergies   Allergen Reactions   • Pcn [Penicillins] Shortness of Breath and Swelling     Per patient's Mom, patient tolerates Keflex   • Lisinopril Unspecified     Per historical: Reported pedal swelling. No facial/angioedema or rash nor respiratory symptoms.    • Promethazine Vomiting       PHYSICAL EXAM  VITAL SIGNS: /88   Pulse (!) 104   Temp 36.5 °C (97.7 °F)   Resp 18   Ht 1.651 m (5' 5\")   Wt 85.7 kg (189 lb)   SpO2 99%   BMI 31.45 kg/m²  Room air O2: 96    Constitutional: Patient appears uncomfortable  HENT: Normocephalic, Atraumatic, Bilateral external ears normal, dry mucous membranes, No oral exudates, Nose normal.   Eyes: PERRLA, EOMI, Conjunctiva normal, No discharge.   Neck: Normal range of motion, No tenderness, Supple, No stridor.   Cardiovascular: Tachycardic Normal " rhythm, No murmurs, No rubs, No gallops.   Thorax & Lungs: Normal breath sounds, No respiratory distress, No wheezing, No chest tenderness.   Abdomen: Bowel sounds normal, Soft, reproducible right lower quadrant pain no hernias or masses no rebound or guarding.   Skin: Warm, Dry, No erythema, No rash.   Back: No tenderness, No CVA tenderness.   Extremities: Intact distal pulses, No edema, No tenderness, No cyanosis, No clubbing.   Neurologic: Alert & oriented x 3, Normal motor function, Normal sensory function, No focal deficits noted.   Psychiatric: Anxious      COURSE & MEDICAL DECISION MAKING  Pertinent Labs & Imaging studies reviewed. (See chart for details)  Results for orders placed or performed during the hospital encounter of 03/15/18   CBC WITH DIFFERENTIAL   Result Value Ref Range    WBC 6.0 4.8 - 10.8 K/uL    RBC 5.27 4.20 - 5.40 M/uL    Hemoglobin 14.9 12.0 - 16.0 g/dL    Hematocrit 43.7 37.0 - 47.0 %    MCV 82.9 81.4 - 97.8 fL    MCH 28.3 27.0 - 33.0 pg    MCHC 34.1 33.6 - 35.0 g/dL    RDW 39.9 35.9 - 50.0 fL    Platelet Count 295 164 - 446 K/uL    MPV 9.9 9.0 - 12.9 fL    Neutrophils-Polys 46.30 44.00 - 72.00 %    Lymphocytes 42.50 (H) 22.00 - 41.00 %    Monocytes 7.40 0.00 - 13.40 %    Eosinophils 2.80 0.00 - 6.90 %    Basophils 0.50 0.00 - 1.80 %    Immature Granulocytes 0.50 0.00 - 0.90 %    Nucleated RBC 0.00 /100 WBC    Neutrophils (Absolute) 2.77 2.00 - 7.15 K/uL    Lymphs (Absolute) 2.54 1.00 - 4.80 K/uL    Monos (Absolute) 0.44 0.00 - 0.85 K/uL    Eos (Absolute) 0.17 0.00 - 0.51 K/uL    Baso (Absolute) 0.03 0.00 - 0.12 K/uL    Immature Granulocytes (abs) 0.03 0.00 - 0.11 K/uL    NRBC (Absolute) 0.00 K/uL   LIPASE   Result Value Ref Range    Lipase 31 11 - 82 U/L   COMP METABOLIC PANEL   Result Value Ref Range    Sodium 139 135 - 145 mmol/L    Potassium 3.5 (L) 3.6 - 5.5 mmol/L    Chloride 107 96 - 112 mmol/L    Co2 21 20 - 33 mmol/L    Anion Gap 11.0 0.0 - 11.9    Glucose 138 (H) 65 - 99 mg/dL     Bun 24 (H) 8 - 22 mg/dL    Creatinine 0.93 0.50 - 1.40 mg/dL    Calcium 9.5 8.5 - 10.5 mg/dL    AST(SGOT) 13 12 - 45 U/L    ALT(SGPT) 17 2 - 50 U/L    Alkaline Phosphatase 119 (H) 30 - 99 U/L    Total Bilirubin 0.3 0.1 - 1.5 mg/dL    Albumin 4.1 3.2 - 4.9 g/dL    Total Protein 7.1 6.0 - 8.2 g/dL    Globulin 3.0 1.9 - 3.5 g/dL    A-G Ratio 1.4 g/dL   HCG QUAL SERUM   Result Value Ref Range    Beta-Hcg Qualitative Serum Negative Negative   URINALYSIS   Result Value Ref Range    Micro Urine Req Microscopic     Color Yellow     Character Clear     Ph 5.5 5.0 - 8.0    Glucose 500 (A) Negative mg/dL    Ketones Trace (A) Negative mg/dL    Protein 100 (A) Negative mg/dL    Bilirubin Negative Negative    Urobilinogen, Urine 0.2 Negative    Nitrite Negative Negative    Leukocyte Esterase Negative Negative    Occult Blood Negative Negative   ESTIMATED GFR   Result Value Ref Range    GFR If African American >60 >60 mL/min/1.73 m 2    GFR If Non African American >60 >60 mL/min/1.73 m 2   REFRACTOMETER SG   Result Value Ref Range    Specific Gravity >1.050    URINE MICROSCOPIC (W/UA)   Result Value Ref Range    WBC 2-5 /hpf    RBC 2-5 (A) /hpf    Bacteria Negative None /hpf    Epithelial Cells Few /hpf    Hyaline Cast 11-20 (A) /lpf      CT-ABDOMEN-PELVIS WITH   Final Result      No evidence of abdominal or pelvic mass, adenopathy, or inflammatory change.                 An IV was established. Differential diagnosis was extensive including pregnancy UTI appendicitis kidney stone gastroparesis. Here the patient has no evidence of leukocytosis or bandemia. Metabolic panel is normal and reassuring and no suggestion of diabetic ketoacidosis. CT scan was performed and there is is no suggestion of appendicitis kidney stone large ovarian cyst or any other mass effect urinalysis is negative for blood or infection. Patient was given IV fluids and antiemetics and feels much better. I advised her that this could be an early process and I  will discharge her home with Zofran and recommend she return here in one to 2 days if symptoms progress or worsen  FINAL IMPRESSION  1. Nausea and vomiting         Electronically signed by: Hi Arellano, 3/15/2018 1:33 PM

## 2018-03-16 NOTE — ED NOTES
Patient and mother given discharge instructions, follow up information, and prescription x 1, verbalized understanding.  PIV removed.  Ambulatory out of ED w/steady gait.

## 2018-04-01 ENCOUNTER — APPOINTMENT (OUTPATIENT)
Dept: RADIOLOGY | Facility: MEDICAL CENTER | Age: 28
DRG: 638 | End: 2018-04-01
Attending: INTERNAL MEDICINE
Payer: MEDICAID

## 2018-04-01 ENCOUNTER — HOSPITAL ENCOUNTER (INPATIENT)
Facility: MEDICAL CENTER | Age: 28
LOS: 4 days | DRG: 638 | End: 2018-04-05
Attending: EMERGENCY MEDICINE | Admitting: INTERNAL MEDICINE
Payer: MEDICAID

## 2018-04-01 ENCOUNTER — RESOLUTE PROFESSIONAL BILLING HOSPITAL PROF FEE (OUTPATIENT)
Dept: HOSPITALIST | Facility: MEDICAL CENTER | Age: 28
End: 2018-04-01
Payer: MEDICAID

## 2018-04-01 DIAGNOSIS — N12 PYELONEPHRITIS: ICD-10-CM

## 2018-04-01 DIAGNOSIS — E10.10 DIABETIC KETOACIDOSIS WITHOUT COMA ASSOCIATED WITH TYPE 1 DIABETES MELLITUS (HCC): ICD-10-CM

## 2018-04-01 PROBLEM — Z91.199 PATIENT NON ADHERENCE: Status: ACTIVE | Noted: 2018-04-01

## 2018-04-01 PROBLEM — E83.51 HYPOCALCEMIA: Status: ACTIVE | Noted: 2018-04-01

## 2018-04-01 LAB
ALBUMIN SERPL BCP-MCNC: 4.2 G/DL (ref 3.2–4.9)
ALBUMIN SERPL BCP-MCNC: 4.5 G/DL (ref 3.2–4.9)
ALBUMIN/GLOB SERPL: 1.4 G/DL
ALBUMIN/GLOB SERPL: 1.5 G/DL
ALP SERPL-CCNC: 142 U/L (ref 30–99)
ALP SERPL-CCNC: 155 U/L (ref 30–99)
ALT SERPL-CCNC: 19 U/L (ref 2–50)
ALT SERPL-CCNC: 19 U/L (ref 2–50)
AMPHET UR QL SCN: NEGATIVE
ANION GAP SERPL CALC-SCNC: 12 MMOL/L (ref 0–11.9)
ANION GAP SERPL CALC-SCNC: 14 MMOL/L (ref 0–11.9)
ANION GAP SERPL CALC-SCNC: 23 MMOL/L (ref 0–11.9)
ANION GAP SERPL CALC-SCNC: 24 MMOL/L (ref 0–11.9)
ANION GAP SERPL CALC-SCNC: 27 MMOL/L (ref 0–11.9)
APPEARANCE UR: CLEAR
AST SERPL-CCNC: 14 U/L (ref 12–45)
AST SERPL-CCNC: 21 U/L (ref 12–45)
B-OH-BUTYR SERPL-MCNC: >8 MMOL/L (ref 0.02–0.27)
BACTERIA #/AREA URNS HPF: ABNORMAL /HPF
BARBITURATES UR QL SCN: NEGATIVE
BASE EXCESS BLDA CALC-SCNC: -27 MMOL/L (ref -4–3)
BASOPHILS # BLD AUTO: 0.7 % (ref 0–1.8)
BASOPHILS # BLD AUTO: 0.7 % (ref 0–1.8)
BASOPHILS # BLD: 0.1 K/UL (ref 0–0.12)
BASOPHILS # BLD: 0.13 K/UL (ref 0–0.12)
BENZODIAZ UR QL SCN: NEGATIVE
BILIRUB SERPL-MCNC: 0.3 MG/DL (ref 0.1–1.5)
BILIRUB SERPL-MCNC: 0.4 MG/DL (ref 0.1–1.5)
BILIRUB UR QL STRIP.AUTO: NEGATIVE
BODY TEMPERATURE: ABNORMAL CENTIGRADE
BUN SERPL-MCNC: 11 MG/DL (ref 8–22)
BUN SERPL-MCNC: 15 MG/DL (ref 8–22)
BUN SERPL-MCNC: 17 MG/DL (ref 8–22)
BUN SERPL-MCNC: 19 MG/DL (ref 8–22)
BUN SERPL-MCNC: 21 MG/DL (ref 8–22)
BZE UR QL SCN: NEGATIVE
CALCIUM SERPL-MCNC: 7.7 MG/DL (ref 8.5–10.5)
CALCIUM SERPL-MCNC: 8 MG/DL (ref 8.5–10.5)
CALCIUM SERPL-MCNC: 8.1 MG/DL (ref 8.5–10.5)
CALCIUM SERPL-MCNC: 8.6 MG/DL (ref 8.5–10.5)
CALCIUM SERPL-MCNC: 8.8 MG/DL (ref 8.5–10.5)
CANNABINOIDS UR QL SCN: NEGATIVE
CHLORIDE SERPL-SCNC: 103 MMOL/L (ref 96–112)
CHLORIDE SERPL-SCNC: 110 MMOL/L (ref 96–112)
CHLORIDE SERPL-SCNC: 112 MMOL/L (ref 96–112)
CHLORIDE SERPL-SCNC: 118 MMOL/L (ref 96–112)
CHLORIDE SERPL-SCNC: 119 MMOL/L (ref 96–112)
CO2 SERPL-SCNC: 11 MMOL/L (ref 20–33)
CO2 SERPL-SCNC: 5 MMOL/L (ref 20–33)
CO2 SERPL-SCNC: 5 MMOL/L (ref 20–33)
CO2 SERPL-SCNC: 8 MMOL/L (ref 20–33)
CO2 SERPL-SCNC: <5 MMOL/L (ref 20–33)
COLOR UR: YELLOW
CREAT SERPL-MCNC: 0.58 MG/DL (ref 0.5–1.4)
CREAT SERPL-MCNC: 0.68 MG/DL (ref 0.5–1.4)
CREAT SERPL-MCNC: 0.94 MG/DL (ref 0.5–1.4)
CREAT SERPL-MCNC: 0.95 MG/DL (ref 0.5–1.4)
CREAT SERPL-MCNC: 0.99 MG/DL (ref 0.5–1.4)
CULTURE IF INDICATED INDCX: YES UA CULTURE
EKG IMPRESSION: NORMAL
EOSINOPHIL # BLD AUTO: 0.05 K/UL (ref 0–0.51)
EOSINOPHIL # BLD AUTO: 0.12 K/UL (ref 0–0.51)
EOSINOPHIL NFR BLD: 0.3 % (ref 0–6.9)
EOSINOPHIL NFR BLD: 0.8 % (ref 0–6.9)
EPI CELLS #/AREA URNS HPF: ABNORMAL /HPF
ERYTHROCYTE [DISTWIDTH] IN BLOOD BY AUTOMATED COUNT: 45.1 FL (ref 35.9–50)
ERYTHROCYTE [DISTWIDTH] IN BLOOD BY AUTOMATED COUNT: 47.1 FL (ref 35.9–50)
EST. AVERAGE GLUCOSE BLD GHB EST-MCNC: 335 MG/DL
ETHANOL BLD-MCNC: 0.01 G/DL
GLOBULIN SER CALC-MCNC: 2.9 G/DL (ref 1.9–3.5)
GLOBULIN SER CALC-MCNC: 3.1 G/DL (ref 1.9–3.5)
GLUCOSE BLD-MCNC: 138 MG/DL (ref 65–99)
GLUCOSE BLD-MCNC: 159 MG/DL (ref 65–99)
GLUCOSE BLD-MCNC: 162 MG/DL (ref 65–99)
GLUCOSE BLD-MCNC: 167 MG/DL (ref 65–99)
GLUCOSE BLD-MCNC: 170 MG/DL (ref 65–99)
GLUCOSE BLD-MCNC: 196 MG/DL (ref 65–99)
GLUCOSE BLD-MCNC: 203 MG/DL (ref 65–99)
GLUCOSE BLD-MCNC: 221 MG/DL (ref 65–99)
GLUCOSE BLD-MCNC: 233 MG/DL (ref 65–99)
GLUCOSE BLD-MCNC: 241 MG/DL (ref 65–99)
GLUCOSE BLD-MCNC: 343 MG/DL (ref 65–99)
GLUCOSE BLD-MCNC: 546 MG/DL (ref 65–99)
GLUCOSE BLD-MCNC: 588 MG/DL (ref 65–99)
GLUCOSE SERPL-MCNC: 192 MG/DL (ref 65–99)
GLUCOSE SERPL-MCNC: 253 MG/DL (ref 65–99)
GLUCOSE SERPL-MCNC: 592 MG/DL (ref 65–99)
GLUCOSE SERPL-MCNC: 623 MG/DL (ref 65–99)
GLUCOSE SERPL-MCNC: 722 MG/DL (ref 65–99)
GLUCOSE UR STRIP.AUTO-MCNC: >=1000 MG/DL
HBA1C MFR BLD: 13.3 % (ref 0–5.6)
HCG UR QL: NEGATIVE
HCO3 BLDA-SCNC: 3 MMOL/L (ref 17–25)
HCT VFR BLD AUTO: 47.6 % (ref 37–47)
HCT VFR BLD AUTO: 48.9 % (ref 37–47)
HGB BLD-MCNC: 14.8 G/DL (ref 12–16)
HGB BLD-MCNC: 15.5 G/DL (ref 12–16)
HYALINE CASTS #/AREA URNS LPF: ABNORMAL /LPF
IMM GRANULOCYTES # BLD AUTO: 0.1 K/UL (ref 0–0.11)
IMM GRANULOCYTES # BLD AUTO: 0.19 K/UL (ref 0–0.11)
IMM GRANULOCYTES NFR BLD AUTO: 0.7 % (ref 0–0.9)
IMM GRANULOCYTES NFR BLD AUTO: 1.1 % (ref 0–0.9)
KETONES UR STRIP.AUTO-MCNC: >=160 MG/DL
LACTATE BLD-SCNC: 1.7 MMOL/L (ref 0.5–2)
LEUKOCYTE ESTERASE UR QL STRIP.AUTO: ABNORMAL
LIPASE SERPL-CCNC: 18 U/L (ref 11–82)
LYMPHOCYTES # BLD AUTO: 2.97 K/UL (ref 1–4.8)
LYMPHOCYTES # BLD AUTO: 4.15 K/UL (ref 1–4.8)
LYMPHOCYTES NFR BLD: 16.6 % (ref 22–41)
LYMPHOCYTES NFR BLD: 28.3 % (ref 22–41)
MAGNESIUM SERPL-MCNC: 1.6 MG/DL (ref 1.5–2.5)
MAGNESIUM SERPL-MCNC: 2 MG/DL (ref 1.5–2.5)
MCH RBC QN AUTO: 28.5 PG (ref 27–33)
MCH RBC QN AUTO: 28.7 PG (ref 27–33)
MCHC RBC AUTO-ENTMCNC: 31.1 G/DL (ref 33.6–35)
MCHC RBC AUTO-ENTMCNC: 31.7 G/DL (ref 33.6–35)
MCV RBC AUTO: 90.1 FL (ref 81.4–97.8)
MCV RBC AUTO: 92.2 FL (ref 81.4–97.8)
METHADONE UR QL SCN: NEGATIVE
MICRO URNS: ABNORMAL
MONOCYTES # BLD AUTO: 0.79 K/UL (ref 0–0.85)
MONOCYTES # BLD AUTO: 0.93 K/UL (ref 0–0.85)
MONOCYTES NFR BLD AUTO: 5.2 % (ref 0–13.4)
MONOCYTES NFR BLD AUTO: 5.4 % (ref 0–13.4)
NEUTROPHILS # BLD AUTO: 13.58 K/UL (ref 2–7.15)
NEUTROPHILS # BLD AUTO: 9.41 K/UL (ref 2–7.15)
NEUTROPHILS NFR BLD: 64.1 % (ref 44–72)
NEUTROPHILS NFR BLD: 76.1 % (ref 44–72)
NITRITE UR QL STRIP.AUTO: NEGATIVE
NRBC # BLD AUTO: 0 K/UL
NRBC # BLD AUTO: 0 K/UL
NRBC BLD-RTO: 0 /100 WBC
NRBC BLD-RTO: 0 /100 WBC
OPIATES UR QL SCN: NEGATIVE
OSMOLALITY SERPL: 319 MOSM/KG H2O (ref 278–298)
OXYCODONE UR QL SCN: NEGATIVE
PCO2 BLDA: 12.7 MMHG (ref 26–37)
PCP UR QL SCN: NEGATIVE
PH BLDA: 6.98 [PH] (ref 7.4–7.5)
PH UR STRIP.AUTO: 5 [PH]
PHOSPHATE SERPL-MCNC: 2 MG/DL (ref 2.5–4.5)
PHOSPHATE SERPL-MCNC: 3.7 MG/DL (ref 2.5–4.5)
PLATELET # BLD AUTO: 294 K/UL (ref 164–446)
PLATELET # BLD AUTO: 311 K/UL (ref 164–446)
PMV BLD AUTO: 10.4 FL (ref 9–12.9)
PMV BLD AUTO: 10.4 FL (ref 9–12.9)
PO2 BLDA: 120.9 MMHG (ref 64–87)
POTASSIUM SERPL-SCNC: 3.6 MMOL/L (ref 3.6–5.5)
POTASSIUM SERPL-SCNC: 4.2 MMOL/L (ref 3.6–5.5)
POTASSIUM SERPL-SCNC: 4.7 MMOL/L (ref 3.6–5.5)
PROPOXYPH UR QL SCN: NEGATIVE
PROT SERPL-MCNC: 7.1 G/DL (ref 6–8.2)
PROT SERPL-MCNC: 7.6 G/DL (ref 6–8.2)
PROT UR QL STRIP: 30 MG/DL
RBC # BLD AUTO: 5.16 M/UL (ref 4.2–5.4)
RBC # BLD AUTO: 5.43 M/UL (ref 4.2–5.4)
RBC # URNS HPF: ABNORMAL /HPF
RBC UR QL AUTO: ABNORMAL
SALICYLATES SERPL-MCNC: 0 MG/DL (ref 15–25)
SAO2 % BLDA: 97 % (ref 93–99)
SODIUM SERPL-SCNC: 135 MMOL/L (ref 135–145)
SODIUM SERPL-SCNC: 138 MMOL/L (ref 135–145)
SODIUM SERPL-SCNC: 141 MMOL/L (ref 135–145)
SP GR UR REFRACTOMETRY: 1.03
SP GR UR STRIP.AUTO: 1.03
UROBILINOGEN UR STRIP.AUTO-MCNC: 0.2 MG/DL
WBC # BLD AUTO: 14.7 K/UL (ref 4.8–10.8)
WBC # BLD AUTO: 17.9 K/UL (ref 4.8–10.8)
WBC #/AREA URNS HPF: ABNORMAL /HPF

## 2018-04-01 PROCEDURE — 82803 BLOOD GASES ANY COMBINATION: CPT

## 2018-04-01 PROCEDURE — 74018 RADEX ABDOMEN 1 VIEW: CPT

## 2018-04-01 PROCEDURE — 700102 HCHG RX REV CODE 250 W/ 637 OVERRIDE(OP): Performed by: EMERGENCY MEDICINE

## 2018-04-01 PROCEDURE — 770022 HCHG ROOM/CARE - ICU (200)

## 2018-04-01 PROCEDURE — 80048 BASIC METABOLIC PNL TOTAL CA: CPT

## 2018-04-01 PROCEDURE — 83605 ASSAY OF LACTIC ACID: CPT

## 2018-04-01 PROCEDURE — 81025 URINE PREGNANCY TEST: CPT

## 2018-04-01 PROCEDURE — 96375 TX/PRO/DX INJ NEW DRUG ADDON: CPT

## 2018-04-01 PROCEDURE — 80307 DRUG TEST PRSMV CHEM ANLYZR: CPT

## 2018-04-01 PROCEDURE — 84100 ASSAY OF PHOSPHORUS: CPT

## 2018-04-01 PROCEDURE — 700102 HCHG RX REV CODE 250 W/ 637 OVERRIDE(OP): Performed by: HOSPITALIST

## 2018-04-01 PROCEDURE — 83735 ASSAY OF MAGNESIUM: CPT

## 2018-04-01 PROCEDURE — 83036 HEMOGLOBIN GLYCOSYLATED A1C: CPT

## 2018-04-01 PROCEDURE — 87040 BLOOD CULTURE FOR BACTERIA: CPT

## 2018-04-01 PROCEDURE — 700105 HCHG RX REV CODE 258: Performed by: EMERGENCY MEDICINE

## 2018-04-01 PROCEDURE — 80053 COMPREHEN METABOLIC PANEL: CPT

## 2018-04-01 PROCEDURE — 85025 COMPLETE CBC W/AUTO DIFF WBC: CPT

## 2018-04-01 PROCEDURE — 81001 URINALYSIS AUTO W/SCOPE: CPT

## 2018-04-01 PROCEDURE — A9270 NON-COVERED ITEM OR SERVICE: HCPCS | Performed by: INTERNAL MEDICINE

## 2018-04-01 PROCEDURE — 96365 THER/PROPH/DIAG IV INF INIT: CPT

## 2018-04-01 PROCEDURE — 700102 HCHG RX REV CODE 250 W/ 637 OVERRIDE(OP): Performed by: INTERNAL MEDICINE

## 2018-04-01 PROCEDURE — 87086 URINE CULTURE/COLONY COUNT: CPT

## 2018-04-01 PROCEDURE — 700105 HCHG RX REV CODE 258: Performed by: HOSPITALIST

## 2018-04-01 PROCEDURE — 700111 HCHG RX REV CODE 636 W/ 250 OVERRIDE (IP): Performed by: HOSPITALIST

## 2018-04-01 PROCEDURE — 700111 HCHG RX REV CODE 636 W/ 250 OVERRIDE (IP): Performed by: EMERGENCY MEDICINE

## 2018-04-01 PROCEDURE — 700111 HCHG RX REV CODE 636 W/ 250 OVERRIDE (IP): Performed by: INTERNAL MEDICINE

## 2018-04-01 PROCEDURE — 83690 ASSAY OF LIPASE: CPT

## 2018-04-01 PROCEDURE — 83930 ASSAY OF BLOOD OSMOLALITY: CPT

## 2018-04-01 PROCEDURE — 82010 KETONE BODYS QUAN: CPT

## 2018-04-01 PROCEDURE — 82962 GLUCOSE BLOOD TEST: CPT

## 2018-04-01 PROCEDURE — 99291 CRITICAL CARE FIRST HOUR: CPT | Performed by: INTERNAL MEDICINE

## 2018-04-01 PROCEDURE — 700105 HCHG RX REV CODE 258: Performed by: INTERNAL MEDICINE

## 2018-04-01 PROCEDURE — 96366 THER/PROPH/DIAG IV INF ADDON: CPT

## 2018-04-01 PROCEDURE — 93005 ELECTROCARDIOGRAM TRACING: CPT | Performed by: INTERNAL MEDICINE

## 2018-04-01 PROCEDURE — 76705 ECHO EXAM OF ABDOMEN: CPT

## 2018-04-01 PROCEDURE — 96376 TX/PRO/DX INJ SAME DRUG ADON: CPT

## 2018-04-01 PROCEDURE — 36415 COLL VENOUS BLD VENIPUNCTURE: CPT

## 2018-04-01 PROCEDURE — 99291 CRITICAL CARE FIRST HOUR: CPT

## 2018-04-01 PROCEDURE — A9270 NON-COVERED ITEM OR SERVICE: HCPCS | Performed by: HOSPITALIST

## 2018-04-01 RX ORDER — SODIUM CHLORIDE, SODIUM LACTATE, POTASSIUM CHLORIDE, CALCIUM CHLORIDE 600; 310; 30; 20 MG/100ML; MG/100ML; MG/100ML; MG/100ML
2000 INJECTION, SOLUTION INTRAVENOUS ONCE
Status: COMPLETED | OUTPATIENT
Start: 2018-04-01 | End: 2018-04-01

## 2018-04-01 RX ORDER — DEXTROSE AND SODIUM CHLORIDE 5; .45 G/100ML; G/100ML
INJECTION, SOLUTION INTRAVENOUS CONTINUOUS
Status: DISCONTINUED | OUTPATIENT
Start: 2018-04-01 | End: 2018-04-03

## 2018-04-01 RX ORDER — HYDROMORPHONE HYDROCHLORIDE 2 MG/ML
1 INJECTION, SOLUTION INTRAMUSCULAR; INTRAVENOUS; SUBCUTANEOUS EVERY 4 HOURS PRN
Status: DISCONTINUED | OUTPATIENT
Start: 2018-04-01 | End: 2018-04-04

## 2018-04-01 RX ORDER — POTASSIUM CHLORIDE 7.45 MG/ML
10 INJECTION INTRAVENOUS
Status: COMPLETED | OUTPATIENT
Start: 2018-04-01 | End: 2018-04-01

## 2018-04-01 RX ORDER — ONDANSETRON 4 MG/1
4 TABLET, ORALLY DISINTEGRATING ORAL EVERY 4 HOURS PRN
Status: DISCONTINUED | OUTPATIENT
Start: 2018-04-01 | End: 2018-04-05 | Stop reason: HOSPADM

## 2018-04-01 RX ORDER — SODIUM CHLORIDE 9 MG/ML
2000 INJECTION, SOLUTION INTRAVENOUS ONCE
Status: COMPLETED | OUTPATIENT
Start: 2018-04-01 | End: 2018-04-01

## 2018-04-01 RX ORDER — CEFTRIAXONE 1 G/1
1 INJECTION, POWDER, FOR SOLUTION INTRAMUSCULAR; INTRAVENOUS ONCE
Status: COMPLETED | OUTPATIENT
Start: 2018-04-01 | End: 2018-04-01

## 2018-04-01 RX ORDER — FAMOTIDINE 20 MG/1
20 TABLET, FILM COATED ORAL DAILY
Status: DISCONTINUED | OUTPATIENT
Start: 2018-04-01 | End: 2018-04-02

## 2018-04-01 RX ORDER — SODIUM CHLORIDE 9 MG/ML
1000 INJECTION, SOLUTION INTRAVENOUS ONCE
Status: COMPLETED | OUTPATIENT
Start: 2018-04-01 | End: 2018-04-01

## 2018-04-01 RX ORDER — ONDANSETRON 2 MG/ML
4 INJECTION INTRAMUSCULAR; INTRAVENOUS ONCE
Status: COMPLETED | OUTPATIENT
Start: 2018-04-01 | End: 2018-04-01

## 2018-04-01 RX ORDER — POTASSIUM CHLORIDE 7.45 MG/ML
10 INJECTION INTRAVENOUS ONCE
Status: COMPLETED | OUTPATIENT
Start: 2018-04-01 | End: 2018-04-01

## 2018-04-01 RX ORDER — DEXTROSE MONOHYDRATE 25 G/50ML
25 INJECTION, SOLUTION INTRAVENOUS
Status: DISCONTINUED | OUTPATIENT
Start: 2018-04-01 | End: 2018-04-01

## 2018-04-01 RX ORDER — SODIUM CHLORIDE 450 MG/100ML
INJECTION, SOLUTION INTRAVENOUS CONTINUOUS
Status: DISCONTINUED | OUTPATIENT
Start: 2018-04-01 | End: 2018-04-03

## 2018-04-01 RX ORDER — METOCLOPRAMIDE HYDROCHLORIDE 5 MG/ML
10 INJECTION INTRAMUSCULAR; INTRAVENOUS EVERY 8 HOURS
Status: DISCONTINUED | OUTPATIENT
Start: 2018-04-01 | End: 2018-04-03

## 2018-04-01 RX ORDER — CALCIUM GLUCONATE 94 MG/ML
1 INJECTION, SOLUTION INTRAVENOUS ONCE
Status: DISCONTINUED | OUTPATIENT
Start: 2018-04-01 | End: 2018-04-01

## 2018-04-01 RX ORDER — MAGNESIUM SULFATE HEPTAHYDRATE 40 MG/ML
4 INJECTION, SOLUTION INTRAVENOUS
Status: COMPLETED | OUTPATIENT
Start: 2018-04-01 | End: 2018-04-02

## 2018-04-01 RX ORDER — DEXTROSE AND SODIUM CHLORIDE 10; .45 G/100ML; G/100ML
INJECTION, SOLUTION INTRAVENOUS CONTINUOUS
Status: ACTIVE | OUTPATIENT
Start: 2018-04-01 | End: 2018-04-03

## 2018-04-01 RX ORDER — POTASSIUM CHLORIDE 7.45 MG/ML
10 INJECTION INTRAVENOUS
Status: COMPLETED | OUTPATIENT
Start: 2018-04-01 | End: 2018-04-02

## 2018-04-01 RX ORDER — HYDROMORPHONE HYDROCHLORIDE 2 MG/ML
0.5 INJECTION, SOLUTION INTRAMUSCULAR; INTRAVENOUS; SUBCUTANEOUS EVERY 4 HOURS PRN
Status: DISCONTINUED | OUTPATIENT
Start: 2018-04-01 | End: 2018-04-04

## 2018-04-01 RX ORDER — SODIUM CHLORIDE 9 MG/ML
2000 INJECTION, SOLUTION INTRAVENOUS ONCE
Status: DISCONTINUED | OUTPATIENT
Start: 2018-04-01 | End: 2018-04-01

## 2018-04-01 RX ORDER — ONDANSETRON 2 MG/ML
4 INJECTION INTRAMUSCULAR; INTRAVENOUS EVERY 4 HOURS PRN
Status: DISCONTINUED | OUTPATIENT
Start: 2018-04-01 | End: 2018-04-05 | Stop reason: HOSPADM

## 2018-04-01 RX ORDER — METOCLOPRAMIDE HYDROCHLORIDE 5 MG/5ML
10 SOLUTION ORAL 3 TIMES DAILY
Status: DISCONTINUED | OUTPATIENT
Start: 2018-04-01 | End: 2018-04-01

## 2018-04-01 RX ORDER — MAGNESIUM SULFATE HEPTAHYDRATE 40 MG/ML
2 INJECTION, SOLUTION INTRAVENOUS
Status: COMPLETED | OUTPATIENT
Start: 2018-04-01 | End: 2018-04-02

## 2018-04-01 RX ORDER — ATORVASTATIN CALCIUM 20 MG/1
20 TABLET, FILM COATED ORAL
Status: DISCONTINUED | OUTPATIENT
Start: 2018-04-01 | End: 2018-04-05 | Stop reason: HOSPADM

## 2018-04-01 RX ORDER — SODIUM CHLORIDE 9 MG/ML
INJECTION, SOLUTION INTRAVENOUS CONTINUOUS
Status: DISCONTINUED | OUTPATIENT
Start: 2018-04-01 | End: 2018-04-01

## 2018-04-01 RX ADMIN — SODIUM CHLORIDE: 4.5 INJECTION, SOLUTION INTRAVENOUS at 12:45

## 2018-04-01 RX ADMIN — FAMOTIDINE 20 MG: 20 TABLET, FILM COATED ORAL at 18:33

## 2018-04-01 RX ADMIN — HYDROMORPHONE HYDROCHLORIDE 1 MG: 2 INJECTION INTRAMUSCULAR; INTRAVENOUS; SUBCUTANEOUS at 13:48

## 2018-04-01 RX ADMIN — HYDROMORPHONE HYDROCHLORIDE 1 MG: 10 INJECTION, SOLUTION INTRAMUSCULAR; INTRAVENOUS; SUBCUTANEOUS at 09:20

## 2018-04-01 RX ADMIN — METOCLOPRAMIDE HYDROCHLORIDE 10 MG: 5 SOLUTION ORAL at 21:04

## 2018-04-01 RX ADMIN — HYDROMORPHONE HYDROCHLORIDE 1 MG: 2 INJECTION INTRAMUSCULAR; INTRAVENOUS; SUBCUTANEOUS at 23:44

## 2018-04-01 RX ADMIN — SODIUM CHLORIDE 4 UNITS/HR: 9 INJECTION, SOLUTION INTRAVENOUS at 12:15

## 2018-04-01 RX ADMIN — HYDROMORPHONE HYDROCHLORIDE 1 MG: 2 INJECTION INTRAMUSCULAR; INTRAVENOUS; SUBCUTANEOUS at 17:52

## 2018-04-01 RX ADMIN — DEXTROSE AND SODIUM CHLORIDE: 10; .45 INJECTION, SOLUTION INTRAVENOUS at 21:46

## 2018-04-01 RX ADMIN — POTASSIUM CHLORIDE 10 MEQ: 7.46 INJECTION, SOLUTION INTRAVENOUS at 12:11

## 2018-04-01 RX ADMIN — CALCIUM GLUCONATE 1 G: 94 INJECTION, SOLUTION INTRAVENOUS at 14:54

## 2018-04-01 RX ADMIN — CEFTRIAXONE SODIUM 1 G: 1 INJECTION, POWDER, FOR SOLUTION INTRAMUSCULAR; INTRAVENOUS at 12:22

## 2018-04-01 RX ADMIN — HYDROMORPHONE HYDROCHLORIDE 1 MG: 10 INJECTION, SOLUTION INTRAMUSCULAR; INTRAVENOUS; SUBCUTANEOUS at 11:23

## 2018-04-01 RX ADMIN — POTASSIUM CHLORIDE 10 MEQ: 7.46 INJECTION, SOLUTION INTRAVENOUS at 17:34

## 2018-04-01 RX ADMIN — ONDANSETRON 4 MG: 2 INJECTION INTRAMUSCULAR; INTRAVENOUS at 21:12

## 2018-04-01 RX ADMIN — MAGNESIUM SULFATE IN WATER 2 G: 40 INJECTION, SOLUTION INTRAVENOUS at 22:39

## 2018-04-01 RX ADMIN — SODIUM CHLORIDE 4 UNITS/HR: 9 INJECTION, SOLUTION INTRAVENOUS at 10:19

## 2018-04-01 RX ADMIN — SODIUM CHLORIDE, POTASSIUM CHLORIDE, SODIUM LACTATE AND CALCIUM CHLORIDE 2000 ML: 600; 310; 30; 20 INJECTION, SOLUTION INTRAVENOUS at 15:17

## 2018-04-01 RX ADMIN — POTASSIUM CHLORIDE 10 MEQ: 7.46 INJECTION, SOLUTION INTRAVENOUS at 23:48

## 2018-04-01 RX ADMIN — ONDANSETRON 4 MG: 2 INJECTION INTRAMUSCULAR; INTRAVENOUS at 17:40

## 2018-04-01 RX ADMIN — POTASSIUM CHLORIDE 10 MEQ: 7.46 INJECTION, SOLUTION INTRAVENOUS at 13:39

## 2018-04-01 RX ADMIN — METOCLOPRAMIDE 10 MG: 5 INJECTION, SOLUTION INTRAMUSCULAR; INTRAVENOUS at 21:51

## 2018-04-01 RX ADMIN — POTASSIUM CHLORIDE 10 MEQ: 7.46 INJECTION, SOLUTION INTRAVENOUS at 22:39

## 2018-04-01 RX ADMIN — INSULIN HUMAN 8 UNITS: 100 INJECTION, SOLUTION PARENTERAL at 10:18

## 2018-04-01 RX ADMIN — SODIUM CHLORIDE 1000 ML: 9 INJECTION, SOLUTION INTRAVENOUS at 09:45

## 2018-04-01 RX ADMIN — ONDANSETRON HYDROCHLORIDE 4 MG: 2 INJECTION, SOLUTION INTRAMUSCULAR; INTRAVENOUS at 09:45

## 2018-04-01 RX ADMIN — METOCLOPRAMIDE HYDROCHLORIDE 10 MG: 5 SOLUTION ORAL at 16:25

## 2018-04-01 RX ADMIN — SODIUM CHLORIDE 2000 ML: 9 INJECTION, SOLUTION INTRAVENOUS at 12:55

## 2018-04-01 RX ADMIN — DEXTROSE AND SODIUM CHLORIDE: 5; 450 INJECTION, SOLUTION INTRAVENOUS at 14:42

## 2018-04-01 RX ADMIN — SODIUM CHLORIDE 1000 ML: 9 INJECTION, SOLUTION INTRAVENOUS at 09:20

## 2018-04-01 RX ADMIN — ATORVASTATIN CALCIUM 20 MG: 20 TABLET, FILM COATED ORAL at 21:04

## 2018-04-01 ASSESSMENT — PAIN SCALES - GENERAL
PAINLEVEL_OUTOF10: 8
PAINLEVEL_OUTOF10: 2
PAINLEVEL_OUTOF10: 4
PAINLEVEL_OUTOF10: 7
PAINLEVEL_OUTOF10: 8
PAINLEVEL_OUTOF10: 8

## 2018-04-01 ASSESSMENT — PATIENT HEALTH QUESTIONNAIRE - PHQ9
SUM OF ALL RESPONSES TO PHQ9 QUESTIONS 1 AND 2: 0
1. LITTLE INTEREST OR PLEASURE IN DOING THINGS: NOT AT ALL
2. FEELING DOWN, DEPRESSED, IRRITABLE, OR HOPELESS: NOT AT ALL
SUM OF ALL RESPONSES TO PHQ9 QUESTIONS 1 AND 2: 0
2. FEELING DOWN, DEPRESSED, IRRITABLE, OR HOPELESS: NOT AT ALL
1. LITTLE INTEREST OR PLEASURE IN DOING THINGS: NOT AT ALL

## 2018-04-01 ASSESSMENT — LIFESTYLE VARIABLES
DO YOU DRINK ALCOHOL: NO
EVER_SMOKED: NEVER
DO YOU DRINK ALCOHOL: NO

## 2018-04-01 NOTE — PROGRESS NOTES
Updated Dr. Bravo that patient to the unit. Patient transferred to unit at 1245 with insulin running at 4 units per hour.

## 2018-04-01 NOTE — ASSESSMENT & PLAN NOTE
- Resolved, either reactive from DKA and vomiting or due to urinary tract infection  - Continue Rocephin

## 2018-04-01 NOTE — ED PROVIDER NOTES
"ED Provider Note    Scribed for Abiodun Armando M.D. by Tiara Alexander. 4/1/2018  9:02 AM    Primary care provider: Navi Huber M.D.  Means of arrival: Walk-in  History obtained from: Patient  History limited by: None    CHIEF COMPLAINT  Chief Complaint   Patient presents with   • N/V   • High Blood Sugar       HPI  Alis Sparks is a 27 y.o. female with a history of Type 1 Diabetes who presents to the Emergency Department for right-sided flank pain, nausea, vomiting, and high blood sugar onset this morning. The patient rates her pain a 9/10 in severity. She describes her vomit as \"dark brown.\" The denies any diarrhea. She has a history of DKA and reports experiencing different abdominal pain. The patient's last menstrual period was 3/10/18.    REVIEW OF SYSTEMS  Pertinent positives include flank pain, nausea, vomiting, high blood sugar. Pertinent negatives include no diarrhea.  All other systems reviewed and negative.  C.    PAST MEDICAL HISTORY   has a past medical history of Backpain; Bronchitis; Diabetes type 1, controlled (CMS-[facility]); DKA (diabetic ketoacidoses) (CMS-[facility]); Fall; Gastroparesis; Kidney infection; Pneumonia; and UTI (urinary tract infection).    SURGICAL HISTORY   has a past surgical history that includes gastroscopy-endo (9/18/2014); gastroscopy with biopsy (9/18/2014); other; submandible abscess incision and drainage (Left, 11/8/2017); and dental extraction(s) (11/8/2017).    SOCIAL HISTORY  Social History   Substance Use Topics   • Smoking status: Never Smoker   • Smokeless tobacco: Never Used   • Alcohol use No      History   Drug Use No       FAMILY HISTORY  Family History   Problem Relation Age of Onset   • Cancer       breasts, multiple family members       CURRENT MEDICATIONS  No current facility-administered medications on file prior to encounter.      Current Outpatient Prescriptions on File Prior to Encounter   Medication Sig Dispense Refill   • insulin " "glargine (BASAGLAR KWIKPEN) 100 UNIT/ML Solution Pen-injector injection Inject 33 Units as instructed 2 times a day.     • insulin glulisine (APIDRA) 100 UNIT/ML Solution Pen-injector injection Inject 20 Units as instructed 3 times a day before meals. Pt states 20 + units depending on sliding scale. Pt can't tell me exact sliding scale     • ferrous sulfate 325 (65 Fe) MG tablet Take 325 mg by mouth every day.     • Cholecalciferol 2000 UNIT Cap Take 1 Cap by mouth every day.     • metoclopramide (REGLAN) 5 MG/5ML Solution Take 10 mg by mouth 3 times a day.     • atorvastatin (LIPITOR) 20 MG Tab Take 1 Tab by mouth every day. 30 Tab 0     ALLERGIES  Allergies   Allergen Reactions   • Pcn [Penicillins] Shortness of Breath and Swelling     Per patient's Mom, patient tolerates Keflex   • Lisinopril Unspecified     Per historical: Reported pedal swelling. No facial/angioedema or rash nor respiratory symptoms.    • Promethazine Vomiting       PHYSICAL EXAM  VITAL SIGNS: /89   Pulse (!) 127   Temp 36.2 °C (97.2 °F)   Resp 20   Ht 1.651 m (5' 5\")   Wt 82 kg (180 lb 12.4 oz)   SpO2 98%   BMI 30.08 kg/m²     Vital signs reviewed.  Constitutional:  Ill-appearing. Patient is vomiting up coffee grounds.  Head: PERRL  Mouth/Throat: Oropharynx is dry.  Abdominal: Normal bowel sounds, soft, generalized tenderness.  Musculoskeletal: Right sided CVA tenderness.  Lymphadenopathy: None  Neurological: Normal, strength/sensation intact.  Skin: Pale, warm.    LABS  Results for orders placed or performed during the hospital encounter of 04/01/18   CBC WITH DIFFERENTIAL   Result Value Ref Range    WBC 14.7 (H) 4.8 - 10.8 K/uL    RBC 5.43 (H) 4.20 - 5.40 M/uL    Hemoglobin 15.5 12.0 - 16.0 g/dL    Hematocrit 48.9 (H) 37.0 - 47.0 %    MCV 90.1 81.4 - 97.8 fL    MCH 28.5 27.0 - 33.0 pg    MCHC 31.7 (L) 33.6 - 35.0 g/dL    RDW 45.1 35.9 - 50.0 fL    Platelet Count 311 164 - 446 K/uL    MPV 10.4 9.0 - 12.9 fL    Neutrophils-Polys " 64.10 44.00 - 72.00 %    Lymphocytes 28.30 22.00 - 41.00 %    Monocytes 5.40 0.00 - 13.40 %    Eosinophils 0.80 0.00 - 6.90 %    Basophils 0.70 0.00 - 1.80 %    Immature Granulocytes 0.70 0.00 - 0.90 %    Nucleated RBC 0.00 /100 WBC    Neutrophils (Absolute) 9.41 (H) 2.00 - 7.15 K/uL    Lymphs (Absolute) 4.15 1.00 - 4.80 K/uL    Monos (Absolute) 0.79 0.00 - 0.85 K/uL    Eos (Absolute) 0.12 0.00 - 0.51 K/uL    Baso (Absolute) 0.10 0.00 - 0.12 K/uL    Immature Granulocytes (abs) 0.10 0.00 - 0.11 K/uL    NRBC (Absolute) 0.00 K/uL   COMP METABOLIC PANEL   Result Value Ref Range    Sodium 135 135 - 145 mmol/L    Potassium 4.2 3.6 - 5.5 mmol/L    Chloride 103 96 - 112 mmol/L    Co2 5 (LL) 20 - 33 mmol/L    Anion Gap 27.0 (H) 0.0 - 11.9    Glucose 722 (HH) 65 - 99 mg/dL    Bun 17 8 - 22 mg/dL    Creatinine 0.94 0.50 - 1.40 mg/dL    Calcium 8.8 8.5 - 10.5 mg/dL    AST(SGOT) 14 12 - 45 U/L    ALT(SGPT) 19 2 - 50 U/L    Alkaline Phosphatase 155 (H) 30 - 99 U/L    Total Bilirubin 0.4 0.1 - 1.5 mg/dL    Albumin 4.5 3.2 - 4.9 g/dL    Total Protein 7.6 6.0 - 8.2 g/dL    Globulin 3.1 1.9 - 3.5 g/dL    A-G Ratio 1.5 g/dL   URINALYSIS CULTURE, IF INDICATED   Result Value Ref Range    Color Yellow     Character Clear     Specific Gravity 1.032 <1.035    Ph 5.0 5.0 - 8.0    Glucose >=1000 (A) Negative mg/dL    Ketones >=160 Negative mg/dL    Protein 30 (A) Negative mg/dL    Bilirubin Negative Negative    Urobilinogen, Urine 0.2 Negative    Nitrite Negative Negative    Leukocyte Esterase Trace (A) Negative    Occult Blood Trace (A) Negative    Micro Urine Req Microscopic     Culture Indicated Yes UA Culture   ESTIMATED GFR   Result Value Ref Range    GFR If African American >60 >60 mL/min/1.73 m 2    GFR If Non African American >60 >60 mL/min/1.73 m 2   BETA-HYDROXYBUTYRIC ACID   Result Value Ref Range    beta-Hydroxybutyric Acid >8.00 (H) 0.02 - 0.27 mmol/L   CBC with Differential   Result Value Ref Range    WBC 17.9 (H) 4.8 - 10.8  K/uL    RBC 5.16 4.20 - 5.40 M/uL    Hemoglobin 14.8 12.0 - 16.0 g/dL    Hematocrit 47.6 (H) 37.0 - 47.0 %    MCV 92.2 81.4 - 97.8 fL    MCH 28.7 27.0 - 33.0 pg    MCHC 31.1 (L) 33.6 - 35.0 g/dL    RDW 47.1 35.9 - 50.0 fL    Platelet Count 294 164 - 446 K/uL    MPV 10.4 9.0 - 12.9 fL    Neutrophils-Polys 76.10 (H) 44.00 - 72.00 %    Lymphocytes 16.60 (L) 22.00 - 41.00 %    Monocytes 5.20 0.00 - 13.40 %    Eosinophils 0.30 0.00 - 6.90 %    Basophils 0.70 0.00 - 1.80 %    Immature Granulocytes 1.10 (H) 0.00 - 0.90 %    Nucleated RBC 0.00 /100 WBC    Neutrophils (Absolute) 13.58 (H) 2.00 - 7.15 K/uL    Lymphs (Absolute) 2.97 1.00 - 4.80 K/uL    Monos (Absolute) 0.93 (H) 0.00 - 0.85 K/uL    Eos (Absolute) 0.05 0.00 - 0.51 K/uL    Baso (Absolute) 0.13 (H) 0.00 - 0.12 K/uL    Immature Granulocytes (abs) 0.19 (H) 0.00 - 0.11 K/uL    NRBC (Absolute) 0.00 K/uL   ARTERIAL BLOOD GAS   Result Value Ref Range    Ph 6.98 (LL) 7.40 - 7.50    Pco2 12.7 (L) 26.0 - 37.0 mmHg    Po2 120.9 (H) 64.0 - 87.0 mmHg    O2 Saturation 97.0 93.0 - 99.0 %    Hco3 3 (L) 17 - 25 mmol/L    Base Excess -27 (L) -4 - 3 mmol/L    Body Temp see below Centigrade   URINE MICROSCOPIC (W/UA)   Result Value Ref Range    WBC 20-50 (A) /hpf    RBC 2-5 (A) /hpf    Bacteria Moderate (A) None /hpf    Epithelial Cells Few /hpf    Hyaline Cast 0-2 /lpf   ACCU-CHEK GLUCOSE   Result Value Ref Range    Glucose - Accu-Ck 588 (HH) 65 - 99 mg/dL   EKG   Result Value Ref Range    Report       [facility]    Test Date:  2018  Pt Name:    AMANDA BRANCH            Department: ER  MRN:        [id number]                      Room:        04  Gender:     Female                       Technician: [id number]  :        1990                   Requested By:HUGO HERNÁNDEZ  Order #:    [id number]                    Reading MD:    Measurements  Intervals                                Axis  Rate:       138                          P:           80  SD:         152                          QRS:        32  QRSD:       70                           T:          105  QT:         264  QTc:        400    Interpretive Statements  SINUS TACHYCARDIA  BORDERLINE T WAVE ABNORMALITIES  Compared to ECG 11/08/2017 19:10:11  T-wave abnormality now present       All labs reviewed by me.    EKG  12 Lead EKG interpreted by me to show sinus rhythm at 140. Normal P waves. Normal ST segments. Abnormal QRS with late R wave progression. Normal T waves. Abnormal EKG showing sinus tachycardia.    Results for orders placed or performed during the hospital encounter of 04/01/18   CBC WITH DIFFERENTIAL   Result Value Ref Range    WBC 14.7 (H) 4.8 - 10.8 K/uL    RBC 5.43 (H) 4.20 - 5.40 M/uL    Hemoglobin 15.5 12.0 - 16.0 g/dL    Hematocrit 48.9 (H) 37.0 - 47.0 %    MCV 90.1 81.4 - 97.8 fL    MCH 28.5 27.0 - 33.0 pg    MCHC 31.7 (L) 33.6 - 35.0 g/dL    RDW 45.1 35.9 - 50.0 fL    Platelet Count 311 164 - 446 K/uL    MPV 10.4 9.0 - 12.9 fL    Neutrophils-Polys 64.10 44.00 - 72.00 %    Lymphocytes 28.30 22.00 - 41.00 %    Monocytes 5.40 0.00 - 13.40 %    Eosinophils 0.80 0.00 - 6.90 %    Basophils 0.70 0.00 - 1.80 %    Immature Granulocytes 0.70 0.00 - 0.90 %    Nucleated RBC 0.00 /100 WBC    Neutrophils (Absolute) 9.41 (H) 2.00 - 7.15 K/uL    Lymphs (Absolute) 4.15 1.00 - 4.80 K/uL    Monos (Absolute) 0.79 0.00 - 0.85 K/uL    Eos (Absolute) 0.12 0.00 - 0.51 K/uL    Baso (Absolute) 0.10 0.00 - 0.12 K/uL    Immature Granulocytes (abs) 0.10 0.00 - 0.11 K/uL    NRBC (Absolute) 0.00 K/uL   COMP METABOLIC PANEL   Result Value Ref Range    Sodium 135 135 - 145 mmol/L    Potassium 4.2 3.6 - 5.5 mmol/L    Chloride 103 96 - 112 mmol/L    Co2 5 (LL) 20 - 33 mmol/L    Anion Gap 27.0 (H) 0.0 - 11.9    Glucose 722 (HH) 65 - 99 mg/dL    Bun 17 8 - 22 mg/dL    Creatinine 0.94 0.50 - 1.40 mg/dL    Calcium 8.8 8.5 - 10.5 mg/dL    AST(SGOT) 14 12 - 45 U/L    ALT(SGPT) 19 2 - 50 U/L    Alkaline  Phosphatase 155 (H) 30 - 99 U/L    Total Bilirubin 0.4 0.1 - 1.5 mg/dL    Albumin 4.5 3.2 - 4.9 g/dL    Total Protein 7.6 6.0 - 8.2 g/dL    Globulin 3.1 1.9 - 3.5 g/dL    A-G Ratio 1.5 g/dL   URINALYSIS CULTURE, IF INDICATED   Result Value Ref Range    Color Yellow     Character Clear     Specific Gravity 1.032 <1.035    Ph 5.0 5.0 - 8.0    Glucose >=1000 (A) Negative mg/dL    Ketones >=160 Negative mg/dL    Protein 30 (A) Negative mg/dL    Bilirubin Negative Negative    Urobilinogen, Urine 0.2 Negative    Nitrite Negative Negative    Leukocyte Esterase Trace (A) Negative    Occult Blood Trace (A) Negative    Micro Urine Req Microscopic     Culture Indicated Yes UA Culture   ESTIMATED GFR   Result Value Ref Range    GFR If African American >60 >60 mL/min/1.73 m 2    GFR If Non African American >60 >60 mL/min/1.73 m 2   BETA-HYDROXYBUTYRIC ACID   Result Value Ref Range    beta-Hydroxybutyric Acid >8.00 (H) 0.02 - 0.27 mmol/L   CBC with Differential   Result Value Ref Range    WBC 17.9 (H) 4.8 - 10.8 K/uL    RBC 5.16 4.20 - 5.40 M/uL    Hemoglobin 14.8 12.0 - 16.0 g/dL    Hematocrit 47.6 (H) 37.0 - 47.0 %    MCV 92.2 81.4 - 97.8 fL    MCH 28.7 27.0 - 33.0 pg    MCHC 31.1 (L) 33.6 - 35.0 g/dL    RDW 47.1 35.9 - 50.0 fL    Platelet Count 294 164 - 446 K/uL    MPV 10.4 9.0 - 12.9 fL    Neutrophils-Polys 76.10 (H) 44.00 - 72.00 %    Lymphocytes 16.60 (L) 22.00 - 41.00 %    Monocytes 5.20 0.00 - 13.40 %    Eosinophils 0.30 0.00 - 6.90 %    Basophils 0.70 0.00 - 1.80 %    Immature Granulocytes 1.10 (H) 0.00 - 0.90 %    Nucleated RBC 0.00 /100 WBC    Neutrophils (Absolute) 13.58 (H) 2.00 - 7.15 K/uL    Lymphs (Absolute) 2.97 1.00 - 4.80 K/uL    Monos (Absolute) 0.93 (H) 0.00 - 0.85 K/uL    Eos (Absolute) 0.05 0.00 - 0.51 K/uL    Baso (Absolute) 0.13 (H) 0.00 - 0.12 K/uL    Immature Granulocytes (abs) 0.19 (H) 0.00 - 0.11 K/uL    NRBC (Absolute) 0.00 K/uL   Comp Metabolic Panel with Phosphorus, Magnesium   Result Value Ref  Range    Sodium 141 135 - 145 mmol/L    Potassium 4.2 3.6 - 5.5 mmol/L    Chloride 112 96 - 112 mmol/L    Co2 <5 (LL) 20 - 33 mmol/L    Anion Gap 24.0 (H) 0.0 - 11.9    Glucose 592 (HH) 65 - 99 mg/dL    Bun 19 8 - 22 mg/dL    Creatinine 0.95 0.50 - 1.40 mg/dL    Calcium 8.0 (L) 8.5 - 10.5 mg/dL    AST(SGOT) 21 12 - 45 U/L    ALT(SGPT) 19 2 - 50 U/L    Alkaline Phosphatase 142 (H) 30 - 99 U/L    Total Bilirubin 0.3 0.1 - 1.5 mg/dL    Albumin 4.2 3.2 - 4.9 g/dL    Total Protein 7.1 6.0 - 8.2 g/dL    Globulin 2.9 1.9 - 3.5 g/dL    A-G Ratio 1.4 g/dL   ARTERIAL BLOOD GAS   Result Value Ref Range    Ph 6.98 (LL) 7.40 - 7.50    Pco2 12.7 (L) 26.0 - 37.0 mmHg    Po2 120.9 (H) 64.0 - 87.0 mmHg    O2 Saturation 97.0 93.0 - 99.0 %    Hco3 3 (L) 17 - 25 mmol/L    Base Excess -27 (L) -4 - 3 mmol/L    Body Temp see below Centigrade   URINE MICROSCOPIC (W/UA)   Result Value Ref Range    WBC 20-50 (A) /hpf    RBC 2-5 (A) /hpf    Bacteria Moderate (A) None /hpf    Epithelial Cells Few /hpf    Hyaline Cast 0-2 /lpf   LIPASE   Result Value Ref Range    Lipase 18 11 - 82 U/L   ESTIMATED GFR   Result Value Ref Range    GFR If African American >60 >60 mL/min/1.73 m 2    GFR If Non African American >60 >60 mL/min/1.73 m 2   ACCU-CHEK GLUCOSE   Result Value Ref Range    Glucose - Accu-Ck 588 (HH) 65 - 99 mg/dL   EKG   Result Value Ref Range    Report       [facility].    Test Date:  2018  Pt Name:    AMANDA BRANCH            Department: ER  MRN:        [id number]                      Room:        04  Gender:     Female                       Technician: [id number]  :        1990                   Requested By:HUGO HERNÁNDEZ  Order #:    [id number]                    Reading MD:    Measurements  Intervals                                Axis  Rate:       138                          P:          80  WI:         152                          QRS:        32  QRSD:       70                            T:          105  QT:         264  QTc:        400    Interpretive Statements  SINUS TACHYCARDIA  BORDERLINE T WAVE ABNORMALITIES  Compared to ECG 11/08/2017 19:10:11  T-wave abnormality now present             COURSE & MEDICAL DECISION MAKING  Pertinent Labs & Imaging studies reviewed. (See chart for details) The patient's [facility] and past medical records were reviewed which revealed the patient was admitted 11/7/17 with a facial abscess and September 2017 with DKA.    9:02 AM - Patient seen and examined at bedside. Patient will be treated with 1 mg IV Dilaudid, 4 mg IV Zofran, and 2,000 ml NS Infusion secondary to dry oropharynx. Ordered CBC with differential, CMP, Blood culture, Urinalysis culture, EKG to evaluate her symptoms. The differential diagnoses include but are not limited to: DKA, kidney stone, pyelonephritis.    10:13 AM Spoke with Dr. Luis, Hospitalist, about the patient's condition. He will admit the patient.    CRITICAL CARE  The very real possibility of a deterioration of this patient's condition required the highest level of my preparedness for sudden, emergent intervention.  I provided critical care services, which included medication orders, frequent reevaluations of the patient's condition and response to treatment, ordering and reviewing test results, and discussing the case with various consultants.  The critical care time associated with the care of the patient was 35 minutes. Review chart for interventions. This time is exclusive of any other billable procedures.     DISPOSITION:  Patient will be admitted to Dr. Luis in critical condition.    FINAL IMPRESSION  1. Pyelonephritis    2. Diabetic ketoacidosis without coma associated with type 1 diabetes mellitus (CMS-[facility])          Tiara BRAMBILA (Annia), am scribing for, and in the presence of, Abiodun Armando M.D..    Electronically signed by: Tiara Alexander (Annia), 4/1/2018    Abiodun BRAMBILA M.D.  personally performed the services described in this documentation, as scribed by Tiara Alexander in my presence, and it is both accurate and complete.    The note accurately reflects work and decisions made by me.  Abiodun Armando  4/1/2018  1:37 PM

## 2018-04-01 NOTE — ASSESSMENT & PLAN NOTE
Intractable n/v due to DKA. Check lipase given abdominal pain but suspect will be low, low suspicion for acute pancreatitis as etiology of DKA.   Given 5 L bolus fluids followed by continuous fluids. Replace electrolytes. IV ondansetron for nausea.

## 2018-04-01 NOTE — ASSESSMENT & PLAN NOTE
HgA1c pending currently. Last HgA1c >14% in September 2017. Insulin gtt currently. Consult complex care management

## 2018-04-01 NOTE — PROGRESS NOTES
Spoke with Dr. Bravo regarding FSBG drop of 102 and protocol would take to zero if insulin dropped 2 units/hour. Dr. Bravo stated to keep the drip at the same rate and start the d/5 1/2 NS at ordered rate.

## 2018-04-01 NOTE — H&P
[facility] Service     Internal Medicine Admitting History and Physical    Note Author: Pancho Schwarz M.D.       Name Alis Sparks     1990   Age/Sex 27 y.o. female   MRN [id number]   Code Status Full      After 5PM or if no immediate response to page, please call for cross-coverage  Attending: Dr. Toby Osuna Hospitalist service    Resident: Dr. Schwarz         Chief Complaint:    Nausea and vomiting, DKA     HPI:    Alis Sparks is a 28 yo woman with type 1 DM (HgA1c 14.2% in 2017) with medication non-adherence (35 admissions in the past including multiple for DKA) and hx of dental abscess s/p oral surgery seen in ED for intractable nausea and vomiting with DKA. She is a poor historian, unfortunately changes history multiple times.     In ED, serum glucose 722 with bicarb 5, anion gap 27 and beta hydroxybutyrate >8. Reports not taking her medications today, and unable to give any details on her medications.     Review of Systems   Unable to perform ROS: Critical illness     Unable to give ROS due to intractable n/v due to DKA         Past Medical History:   Past Medical History:   Diagnosis Date   • Backpain    • Bronchitis    • Diabetes type 1, controlled (CMS-HCC)     tests 4-5 times daily   • DKA (diabetic ketoacidoses) (CMS-HCC)    • Fall    • Gastroparesis    • Kidney infection    • Pneumonia    • UTI (urinary tract infection)        Past Surgical History:  Past Surgical History:   Procedure Laterality Date   • SUBMANDIBLE ABSCESS INCISION AND DRAINAGE Left 2017    Procedure: SUBMANDIBLE ABSCESS INCISION AND DRAINAGE;  Surgeon: Osman Young M.D.;  Location: SURGERY [facility];  Service: Dental   • DENTAL EXTRACTION(S)  2017    Procedure: DENTAL EXTRACTION(S);  Surgeon: Osman Young M.D.;  Location: SURGERY [facility];  Service: Dental   • GASTROSCOPY-ENDO  2014    Performed by Sylvain PERRY M.D. at ENDOSCOPY [facility]   •  GASTROSCOPY WITH BIOPSY  9/18/2014    Performed by Sylvain PERRY M.D. at ENDOSCOPY [facility]   • OTHER      surgery to patch lung after pneumor from central line placement       Current Outpatient Medications:  Home Medications     Reviewed by Giovanna Miramontes, Pharmacy Intern (Pharmacy Intern) on 04/01/18 at 1037  Med List Status: Complete   Medication Last Dose Status   atorvastatin (LIPITOR) 20 MG Tab 3/31/2018 Active   Cholecalciferol 2000 UNIT Cap 3/31/2018 Active   ferrous sulfate 325 (65 Fe) MG tablet 3/31/2018 Active   insulin glargine (BASAGLAR KWIKPEN) 100 UNIT/ML Solution Pen-injector injection 3/31/2018 Active   insulin glulisine (APIDRA) 100 UNIT/ML Solution Pen-injector injection 3/31/2018 Active   metoclopramide (REGLAN) 5 MG/5ML Solution 3/31/2018 Active                Medication Allergy/Sensitivities:  Allergies   Allergen Reactions   • Pcn [Penicillins] Shortness of Breath and Swelling     Per patient's Mom, patient tolerates Keflex   • Lisinopril Unspecified     Per historical: Reported pedal swelling. No facial/angioedema or rash nor respiratory symptoms.    • Promethazine Vomiting         Family History:  Family History   Problem Relation Age of Onset   • Cancer       breasts, multiple family members       Social History:  Social History     Social History   • Marital status: Single     Spouse name: N/A   • Number of children: N/A   • Years of education: N/A     Occupational History   • Not on file.     Social History Main Topics   • Smoking status: Never Smoker   • Smokeless tobacco: Never Used   • Alcohol use No   • Drug use: No   • Sexual activity: No     Other Topics Concern   • Not on file     Social History Narrative   • No narrative on file     PCP : Navi Huber      Physical Exam     Vitals:    04/01/18 0930 04/01/18 1030 04/01/18 1245 04/01/18 1256   BP:       Pulse: (!) 132 (!) 131 (!) 146    Resp:  (!) 25 (!) 22    Temp:   36.6 °C (97.8 °F)    SpO2: 98% 98% 99%   "  Weight:    86.2 kg (190 lb 0.6 oz)   Height:    1.651 m (5' 5\")     Body mass index is 31.62 kg/m².  /89   Pulse (!) 146   Temp 36.6 °C (97.8 °F)   Resp (!) 22   Ht 1.651 m (5' 5\")   Wt 86.2 kg (190 lb 0.6 oz)   SpO2 99%   BMI 31.62 kg/m²   O2 therapy: Pulse Oximetry: 99 %    Physical Exam   Constitutional: She is oriented to person, place, and time. She has a sickly appearance. She appears distressed.   Strong fruity breath   HENT:   Mouth/Throat: Mucous membranes are not pale, dry and not cyanotic. Abnormal dentition. Dental caries present.   Eyes: EOM are normal. Pupils are equal, round, and reactive to light. No scleral icterus.   Neck: Normal range of motion. Neck supple. No JVD present.   Cardiovascular: S1 normal, S2 normal, normal heart sounds and intact distal pulses.  Tachycardia present.  Exam reveals no gallop.    No murmur heard.  Pulses:       Dorsalis pedis pulses are 2+ on the right side, and 2+ on the left side.   Pulmonary/Chest: Breath sounds normal. No stridor. Tachypnea noted. She is in respiratory distress. She has no wheezes. She has no rales. She exhibits no tenderness.   Kussmaul breathing   Abdominal: Soft. Bowel sounds are hypoactive. There is no hepatosplenomegaly. There is tenderness. There is negative Urrutia's sign.   Musculoskeletal: Normal range of motion. She exhibits no edema, tenderness or deformity.   Neurological: She is alert and oriented to person, place, and time. No cranial nerve deficit. Coordination normal.   Skin: Skin is warm and dry. No rash noted. No erythema. No pallor.   Psychiatric: Mood, memory, affect and judgment normal.             Data Review       Old Records Request:   Completed  Current Records review and summary: Completed    Lab Data Review:  Recent Results (from the past 24 hour(s))   CBC WITH DIFFERENTIAL    Collection Time: 04/01/18  9:11 AM   Result Value Ref Range    WBC 14.7 (H) 4.8 - 10.8 K/uL    RBC 5.43 (H) 4.20 - 5.40 M/uL    " Hemoglobin 15.5 12.0 - 16.0 g/dL    Hematocrit 48.9 (H) 37.0 - 47.0 %    MCV 90.1 81.4 - 97.8 fL    MCH 28.5 27.0 - 33.0 pg    MCHC 31.7 (L) 33.6 - 35.0 g/dL    RDW 45.1 35.9 - 50.0 fL    Platelet Count 311 164 - 446 K/uL    MPV 10.4 9.0 - 12.9 fL    Neutrophils-Polys 64.10 44.00 - 72.00 %    Lymphocytes 28.30 22.00 - 41.00 %    Monocytes 5.40 0.00 - 13.40 %    Eosinophils 0.80 0.00 - 6.90 %    Basophils 0.70 0.00 - 1.80 %    Immature Granulocytes 0.70 0.00 - 0.90 %    Nucleated RBC 0.00 /100 WBC    Neutrophils (Absolute) 9.41 (H) 2.00 - 7.15 K/uL    Lymphs (Absolute) 4.15 1.00 - 4.80 K/uL    Monos (Absolute) 0.79 0.00 - 0.85 K/uL    Eos (Absolute) 0.12 0.00 - 0.51 K/uL    Baso (Absolute) 0.10 0.00 - 0.12 K/uL    Immature Granulocytes (abs) 0.10 0.00 - 0.11 K/uL    NRBC (Absolute) 0.00 K/uL   COMP METABOLIC PANEL    Collection Time: 04/01/18  9:11 AM   Result Value Ref Range    Sodium 135 135 - 145 mmol/L    Potassium 4.2 3.6 - 5.5 mmol/L    Chloride 103 96 - 112 mmol/L    Co2 5 (LL) 20 - 33 mmol/L    Anion Gap 27.0 (H) 0.0 - 11.9    Glucose 722 (HH) 65 - 99 mg/dL    Bun 17 8 - 22 mg/dL    Creatinine 0.94 0.50 - 1.40 mg/dL    Calcium 8.8 8.5 - 10.5 mg/dL    AST(SGOT) 14 12 - 45 U/L    ALT(SGPT) 19 2 - 50 U/L    Alkaline Phosphatase 155 (H) 30 - 99 U/L    Total Bilirubin 0.4 0.1 - 1.5 mg/dL    Albumin 4.5 3.2 - 4.9 g/dL    Total Protein 7.6 6.0 - 8.2 g/dL    Globulin 3.1 1.9 - 3.5 g/dL    A-G Ratio 1.5 g/dL   ESTIMATED GFR    Collection Time: 04/01/18  9:11 AM   Result Value Ref Range    GFR If African American >60 >60 mL/min/1.73 m 2    GFR If Non African American >60 >60 mL/min/1.73 m 2   BETA-HYDROXYBUTYRIC ACID    Collection Time: 04/01/18  9:11 AM   Result Value Ref Range    beta-Hydroxybutyric Acid >8.00 (H) 0.02 - 0.27 mmol/L   URINALYSIS CULTURE, IF INDICATED    Collection Time: 04/01/18 11:03 AM   Result Value Ref Range    Color Yellow     Character Clear     Specific Gravity 1.032 <1.035    Ph 5.0 5.0 - 8.0     Glucose >=1000 (A) Negative mg/dL    Ketones >=160 Negative mg/dL    Protein 30 (A) Negative mg/dL    Bilirubin Negative Negative    Urobilinogen, Urine 0.2 Negative    Nitrite Negative Negative    Leukocyte Esterase Trace (A) Negative    Occult Blood Trace (A) Negative    Micro Urine Req Microscopic     Culture Indicated Yes UA Culture   ACCU-CHEK GLUCOSE    Collection Time: 18 11:03 AM   Result Value Ref Range    Glucose - Accu-Ck 588 (HH) 65 - 99 mg/dL   URINE MICROSCOPIC (W/UA)    Collection Time: 18 11:03 AM   Result Value Ref Range    WBC 20-50 (A) /hpf    RBC 2-5 (A) /hpf    Bacteria Moderate (A) None /hpf    Epithelial Cells Few /hpf    Hyaline Cast 0-2 /lpf   EKG    Collection Time: 18 11:12 AM   Result Value Ref Range    Report       [facility] Emergency Dept.    Test Date:  2018  Pt Name:    AMANDA BRANCH            Department: ER  MRN:        [id number]                      Room:       [facility]  Gender:     Female                       Technician: [id number]  :        1990                   Requested By:HUGO HERNÁNDEZ  Order #:    [id number]                    Reading MD:    Measurements  Intervals                                Axis  Rate:       138                          P:          80  AR:         152                          QRS:        32  QRSD:       70                           T:          105  QT:         264  QTc:        400    Interpretive Statements  SINUS TACHYCARDIA  BORDERLINE T WAVE ABNORMALITIES  Compared to ECG 2017 19:10:11  T-wave abnormality now present     CBC with Differential    Collection Time: 18 11:21 AM   Result Value Ref Range    WBC 17.9 (H) 4.8 - 10.8 K/uL    RBC 5.16 4.20 - 5.40 M/uL    Hemoglobin 14.8 12.0 - 16.0 g/dL    Hematocrit 47.6 (H) 37.0 - 47.0 %    MCV 92.2 81.4 - 97.8 fL    MCH 28.7 27.0 - 33.0 pg    MCHC 31.1 (L) 33.6 - 35.0 g/dL    RDW 47.1 35.9 - 50.0 fL    Platelet Count 294 164 - 446 K/uL    MPV  10.4 9.0 - 12.9 fL    Neutrophils-Polys 76.10 (H) 44.00 - 72.00 %    Lymphocytes 16.60 (L) 22.00 - 41.00 %    Monocytes 5.20 0.00 - 13.40 %    Eosinophils 0.30 0.00 - 6.90 %    Basophils 0.70 0.00 - 1.80 %    Immature Granulocytes 1.10 (H) 0.00 - 0.90 %    Nucleated RBC 0.00 /100 WBC    Neutrophils (Absolute) 13.58 (H) 2.00 - 7.15 K/uL    Lymphs (Absolute) 2.97 1.00 - 4.80 K/uL    Monos (Absolute) 0.93 (H) 0.00 - 0.85 K/uL    Eos (Absolute) 0.05 0.00 - 0.51 K/uL    Baso (Absolute) 0.13 (H) 0.00 - 0.12 K/uL    Immature Granulocytes (abs) 0.19 (H) 0.00 - 0.11 K/uL    NRBC (Absolute) 0.00 K/uL   Comp Metabolic Panel with Phosphorus, Magnesium    Collection Time: 04/01/18 11:21 AM   Result Value Ref Range    Sodium 141 135 - 145 mmol/L    Potassium 4.2 3.6 - 5.5 mmol/L    Chloride 112 96 - 112 mmol/L    Co2 <5 (LL) 20 - 33 mmol/L    Anion Gap 24.0 (H) 0.0 - 11.9    Glucose 592 (HH) 65 - 99 mg/dL    Bun 19 8 - 22 mg/dL    Creatinine 0.95 0.50 - 1.40 mg/dL    Calcium 8.0 (L) 8.5 - 10.5 mg/dL    AST(SGOT) 21 12 - 45 U/L    ALT(SGPT) 19 2 - 50 U/L    Alkaline Phosphatase 142 (H) 30 - 99 U/L    Total Bilirubin 0.3 0.1 - 1.5 mg/dL    Albumin 4.2 3.2 - 4.9 g/dL    Total Protein 7.1 6.0 - 8.2 g/dL    Globulin 2.9 1.9 - 3.5 g/dL    A-G Ratio 1.4 g/dL   ARTERIAL BLOOD GAS    Collection Time: 04/01/18 11:21 AM   Result Value Ref Range    Ph 6.98 (LL) 7.40 - 7.50    Pco2 12.7 (L) 26.0 - 37.0 mmHg    Po2 120.9 (H) 64.0 - 87.0 mmHg    O2 Saturation 97.0 93.0 - 99.0 %    Hco3 3 (L) 17 - 25 mmol/L    Base Excess -27 (L) -4 - 3 mmol/L    Body Temp see below Centigrade   LIPASE    Collection Time: 04/01/18 11:21 AM   Result Value Ref Range    Lipase 18 11 - 82 U/L   ESTIMATED GFR    Collection Time: 04/01/18 11:21 AM   Result Value Ref Range    GFR If African American >60 >60 mL/min/1.73 m 2    GFR If Non African American >60 >60 mL/min/1.73 m 2   ACCU-CHEK GLUCOSE    Collection Time: 04/01/18 12:07 PM   Result Value Ref Range    Glucose  - Accu-Ck 546 (HH) 65 - 99 mg/dL   ACCU-CHEK GLUCOSE    Collection Time: 04/01/18  1:22 PM   Result Value Ref Range    Glucose - Accu-Ck 343 (H) 65 - 99 mg/dL       Imaging/Procedures Review:    ndependant Imaging Review: Completed  US-LIVER AND BILIARY TREE   Final Result         1. Minimal gallbladder sludge is seen. No sonographic evidence of acute cholecystitis.         [id number] VIEW   Final Result         Moderate amount of stool throughout the colon.             EKG:   EKG Independant Review: Completed  QTc:400, HR: 140, Sinus tachycardia with acute ST segment or T wave changes              Assessment/Plan     * DKA (diabetic ketoacidoses) (CMS-[facility])- (present on admission)   Assessment & Plan    Likely due to medical non adherence. No evidence of infection, arrhythmia or MI. Do not suspect acute pancreatitis but will obtain serum lipase levels. No evidence of acute cholecystitis   DKA protocol with 5 L fluid bolus followed by infusion. Will give 20 mEq potassium for each L fluid to keep K between 4-5.   Beta HB >8 with severe acidosis with bicarb 5 and pH 6.98. Bicarb infusion not indicated currently.   IV insulin bolus 8 u followed by drip until anion gap normalizes         Hyperglycemia due to type 1 diabetes mellitus (CMS-HCC)- (present on admission)   Assessment & Plan    HgA1c pending currently. Last HgA1c >14% in September 2017. Insulin gtt currently. Consult complex care management         High anion gap metabolic acidosis- (present on admission)   Assessment & Plan    Due to DKA. Will obtain serum lactate, serum alcohol, aspirin and osm gap but do not suspect ingestions.   Treat with fluid resuscitation, insulin to correct anion gap acidosis and electrolyte repletion.         Gastroparesis due to DM (CMS-HCC)- (present on admission)   Assessment & Plan    No evidence of bowel obstruction on imaging. Restart home metoclopramide         Nausea and vomiting- (present on admission)   Assessment & Plan     Intractable n/v due to DKA. Check lipase given abdominal pain but suspect will be low, low suspicion for acute pancreatitis as etiology of DKA.   Given 5 L bolus fluids followed by continuous fluids. Replace electrolytes. IV ondansetron for nausea.         Hypocalcemia   Assessment & Plan    Replete with IV calcium gluconate         Patient non adherence- (present on admission)   Assessment & Plan    Chronic non adherence to type 1 DM. Pt unable to give reasons for non-adherence (financial, social, etc) due to n/v and pain currently. Consult complex medical management services.         Leukocytosis- (present on admission)   Assessment & Plan    Likely due to acute stress reaction from DKA            Anticipated Hospital stay:  >2 midnights        Quality Measures  Quality-Core Measures   Reviewed items::  EKG reviewed, Labs reviewed, Medications reviewed and Radiology images reviewed  Pitt catheter::  No Pitt  DVT prophylaxis pharmacological::  Enoxaparin (Lovenox)  DVT prophylaxis - mechanical:  SCDs  Ulcer Prophylaxis::  Not indicated

## 2018-04-01 NOTE — ED TRIAGE NOTES
C/o nausea beginning last night, vomiting dark brown this morning , no diarrhea, R flank pain, is IDDM FSBS at home 415, denies use of blood thinners, asa, nsaids, to red 4

## 2018-04-01 NOTE — CONSULTS
Pulmonary Consultation       Patient ID:   Name:             Alis Sparks   YOB: 1990  Age:                 27 y.o.  female   MRN:               [id number]                                                  Reason of Consult:  DKA     Requesting physician:  Dr Luis     History of Present Illness:  The patient is 27 yrs old female with history of poorly controlled insulin dependent DM, A1C 14%. Presented to the ER with abdominal pain nausea and vomiting and was found to be in severe DKA   With PH of 6.97 and AG of 24. Pt did not take her morning dose of insulin but took last night dose. She was having rt flank pain. But no other UTI symptoms     Pt is none complaint in general with medications. Has similar presentations in the past     ROS:  Complete ROS was done and was negative except what mentioned in HPI     Past Medical History:  Past Medical History:   Diagnosis Date   • Backpain    • Bronchitis    • Diabetes type 1, controlled (CMS-HCC)     tests 4-5 times daily   • DKA (diabetic ketoacidoses) (CMS-[facility])    • Fall    • Gastroparesis    • Kidney infection    • Pneumonia    • UTI (urinary tract infection)        Past surgical history:  Past Surgical History:   Procedure Laterality Date   • SUBMANDIBLE ABSCESS INCISION AND DRAINAGE Left 11/8/2017    Procedure: SUBMANDIBLE ABSCESS INCISION AND DRAINAGE;  Surgeon: Osman Young M.D.;  Location: SURGERY [facility];  Service: Dental   • DENTAL EXTRACTION(S)  11/8/2017    Procedure: DENTAL EXTRACTION(S);  Surgeon: Osman Young M.D.;  Location: SURGERY [facility];  Service: Dental   • GASTROSCOPY-ENDO  9/18/2014    Performed by Sylvain PERRY M.D. at ENDOSCOPY [facility]   • GASTROSCOPY WITH BIOPSY  9/18/2014    Performed by Sylvain PERRY M.D. at ENDOSCOPY [facility]   • OTHER      surgery to patch lung after pneumor from central line placement       Family History:  Family History   Problem Relation Age of Onset   •  Cancer       breasts, multiple family members       Hospital Medications:    Current Facility-Administered Medications:   •  atorvastatin (LIPITOR) tablet 20 mg, 20 mg, Oral, QHS, Ruddy Luis M.D.  •  dextrose 10% and 0.45% NaCl infusion, , Intravenous, Continuous, Ruddy Luis M.D.  •  MD ALERT-PHARMACY TO CONSULT FOR DKA MONITORING 1 Each, 1 Each, Other, PRN, Ruddy Luis M.D.  •  Adult DKA potassium(K+) replacement scale, 1 Each, Intravenous, Q4HR, Ruddy Luis M.D.  •  magnesium sulfate IVPB premix 2 g, 2 g, Intravenous, Once PRN **OR** magnesium sulfate IVPB premix 4 g, 4 g, Intravenous, Once PRN, Ruddy Luis M.D.  •  potassium phosphates 30 mmol in  mL ivpb, 30 mmol, Intravenous, Once PRN **OR** sodium phosphate 30 mmol in 1/2  mL ivpb, 30 mmol, Intravenous, Once PRN, Ruddy Luis M.D.  •  Respiratory Care per Protocol, , Nebulization, Continuous RT, Ruddy Luis M.D.  •  1/2 NS infusion, , Intravenous, Continuous, Ruddy Luis M.D., Last Rate: 125 mL/hr at 04/01/18 1245  •  D5 1/2 NS infusion, , Intravenous, Continuous, Ruddy Luis M.D.  •  ondansetron (ZOFRAN) syringe/vial injection 4 mg, 4 mg, Intravenous, Q4HRS PRN, Ruddy Luis M.D.  •  ondansetron (ZOFRAN ODT) dispertab 4 mg, 4 mg, Oral, Q4HRS PRN, Ruddy Luis M.D.  •  insulin regular human (HUMULIN/NOVOLIN R) 62.5 Units in  mL Infusion for DKA, 4 Units/hr, Intravenous, Continuous, Ruddy Luis M.D., Last Rate: 16 mL/hr at 04/01/18 1251, 4 Units/hr at 04/01/18 1251  •  potassium chloride in water (KCL) ivpb 10 mEq, 10 mEq, Intravenous, Q HOUR, Ruddy Luis M.D., 10 mEq at 04/01/18 1211  •  HYDROmorphone (DILAUDID) injection 0.5 mg, 0.5 mg, Intravenous, Q4HRS PRN, Ml Bravo M.D.  •  HYDROmorphone (DILAUDID) injection 1 mg, 1 mg, Intravenous, Q4HRS PRN, Ml Bravo M.D.    Current Outpatient Medications:  Prescriptions Prior to Admission   Medication Sig Dispense  "Refill Last Dose   • insulin glargine (BASAGLAR KWIKPEN) 100 UNIT/ML Solution Pen-injector injection Inject 33 Units as instructed 2 times a day.   3/31/2018 at pm   • insulin glulisine (APIDRA) 100 UNIT/ML Solution Pen-injector injection Inject 20 Units as instructed 3 times a day before meals. Pt states 20 + units depending on sliding scale. Pt can't tell me exact sliding scale   3/31/2018 at pm   • ferrous sulfate 325 (65 Fe) MG tablet Take 325 mg by mouth every day.   3/31/2018 at am   • Cholecalciferol 2000 UNIT Cap Take 1 Cap by mouth every day.   3/31/2018 at am   • metoclopramide (REGLAN) 5 MG/5ML Solution Take 10 mg by mouth 3 times a day.   3/31/2018 at pm   • atorvastatin (LIPITOR) 20 MG Tab Take 1 Tab by mouth every day. 30 Tab 0 3/31/2018 at pm       Medication Allergy:  Allergies   Allergen Reactions   • Pcn [Penicillins] Shortness of Breath and Swelling     Per patient's Mom, patient tolerates Keflex   • Lisinopril Unspecified     Per historical: Reported pedal swelling. No facial/angioedema or rash nor respiratory symptoms.    • Promethazine Vomiting             Objective:   Vitals/ General Appearance:   Weight/BMI: Body mass index is 31.62 kg/m².  Blood pressure 134/89, pulse (!) 146, temperature 36.6 °C (97.8 °F), resp. rate (!) 22, height 1.651 m (5' 5\"), weight 86.2 kg (190 lb 0.6 oz), SpO2 99 %.  Vitals:    04/01/18 0930 04/01/18 1030 04/01/18 1245 04/01/18 1256   BP:       Pulse: (!) 132 (!) 131 (!) 146    Resp:  (!) 25 (!) 22    Temp:   36.6 °C (97.8 °F)    SpO2: 98% 98% 99%    Weight:    86.2 kg (190 lb 0.6 oz)   Height:    1.651 m (5' 5\")     Oxygen Therapy:  Pulse Oximetry: 99 %    Constitutional:   Well developed, Well nourished, No acute distress  HENMT:  Normocephalic, Atraumatic, Oropharynx moist mucous membranes, No oral exudates, Nose normal.  No thyromegaly.  Eyes:  EOMI, Conjunctiva normal, No discharge.  Neck:  Normal range of motion, No cervical tenderness,  no " JVD.  Cardiovascular:  Normal heart rate, Normal rhythm, No murmurs, No rubs, No gallops.   Extremitites with intact distal pulses, no cyanosis, or edema.  Lungs:  Normal breath sounds, breath sounds clear to auscultation bilaterally,  no crackles, no wheezing.   Abdomen: hypoactive bowel sounds  Soft, No tenderness, No guarding, No rebound, No masses,Rt flank tenderness  No hepatosplenomegaly.  Skin: Warm, Dry, No erythema, No rash, no induration.  Neurologic: Alert & oriented x 3, No focal deficits noted, cranial nerves II through X are grossly intact.  Psychiatric: Affect normal, Judgment normal, Mood normal.    Labs:  Recent Labs      04/01/18   0911  04/01/18   1121   WBC  14.7*  17.9*   RBC  5.43*  5.16   HEMOGLOBIN  15.5  14.8   HEMATOCRIT  48.9*  47.6*   MCV  90.1  92.2   MCH  28.5  28.7   MCHC  31.7*  31.1*   RDW  45.1  47.1   PLATELETCT  311  294   MPV  10.4  10.4                 Recent Labs      04/01/18   0911  04/01/18   1121   SODIUM  135  141   POTASSIUM  4.2  4.2   CHLORIDE  103  112   CO2  5*  <5*   GLUCOSE  722*  592*   BUN  17  19     Recent Labs      04/01/18   0911  04/01/18   1121   SODIUM  135  141   POTASSIUM  4.2  4.2   CHLORIDE  103  112   CO2  5*  <5*   BUN  17  19   CREATININE  0.94  0.95   CALCIUM  8.8  8.0*     No results found for this or any previous visit.      Imaging:   US-LIVER AND BILIARY TREE   Final Result         1. Minimal gallbladder sludge is seen. No sonographic evidence of acute cholecystitis.         [id number] VIEW   Final Result         Moderate amount of stool throughout the colon.              Assessment and Plan:  DKA (diabetic ketoacidoses) (CMS-HCC)  Probably secondary to UTI and to none complaint   Severe with PH of 6.97 and bicarb of 3  Cont abx   Cont ivf   Cont insulin drip as per protocol   Electrolytes check and replacement as per protocol   Monitor Ins and outs     UTI with some flank pain rule out pyelonephritis   US did not show any stones or  hydronephrosis   Cont abx      Nausea and vomiting  Due to DKA   Hopefully will improve with the treatment of DKA   Symptomatic treatment        Gastroparesis due to DM (CMS-HCC)   Restart home metoclopramide       Pt is critically ill, she needs ICU care and frequent re assessment of her treatment plan   She is at higher risk for deterioration   Critical care time was 35 minutes

## 2018-04-01 NOTE — ASSESSMENT & PLAN NOTE
Due to DKA. Will obtain serum lactate, serum alcohol, aspirin and osm gap but do not suspect ingestions.   Treat with fluid resuscitation, insulin to correct anion gap acidosis and electrolyte repletion.

## 2018-04-01 NOTE — ASSESSMENT & PLAN NOTE
-Resolved, has now transitioned  - Continue Lantus and insulin sliding scale  - Nausea vomiting are much improved, patient is now tolerating a diet

## 2018-04-01 NOTE — PROGRESS NOTES
Cristel from Lab called with critical result of CO2 8 at 1640. Critical lab result read back to Cristel.   Dr. Melvin notified of critical lab result at 1641.  Critical lab result read back by Dr. Melvin.

## 2018-04-01 NOTE — PROGRESS NOTES
Cristel from Lab called with critical result of CO2 5 and Glucose of 623 at 1538 from a lab at 1056 . Critical lab result read back to Cristel.   Dr. Melvin notified of critical lab result at 1540.  Critical lab result read back by Dr. Bravo.

## 2018-04-01 NOTE — PROGRESS NOTES
Spoke with Dr. Amanda and Dr. Bravo regarding need for arterial versus veinous ABG. DrHollis's concurred that the patient did not need another ABG at this time.. LR ordered from central for bolus.

## 2018-04-02 LAB
ALBUMIN SERPL BCP-MCNC: 3.1 G/DL (ref 3.2–4.9)
ALBUMIN SERPL BCP-MCNC: 3.1 G/DL (ref 3.2–4.9)
ALBUMIN/GLOB SERPL: 1.4 G/DL
ALBUMIN/GLOB SERPL: 1.5 G/DL
ALP SERPL-CCNC: 91 U/L (ref 30–99)
ALP SERPL-CCNC: 95 U/L (ref 30–99)
ALT SERPL-CCNC: 12 U/L (ref 2–50)
ALT SERPL-CCNC: 12 U/L (ref 2–50)
ANION GAP SERPL CALC-SCNC: 10 MMOL/L (ref 0–11.9)
ANION GAP SERPL CALC-SCNC: 7 MMOL/L (ref 0–11.9)
ANION GAP SERPL CALC-SCNC: 7 MMOL/L (ref 0–11.9)
ANION GAP SERPL CALC-SCNC: 8 MMOL/L (ref 0–11.9)
AST SERPL-CCNC: 10 U/L (ref 12–45)
AST SERPL-CCNC: 12 U/L (ref 12–45)
BASOPHILS # BLD AUTO: 0.4 % (ref 0–1.8)
BASOPHILS # BLD: 0.03 K/UL (ref 0–0.12)
BILIRUB SERPL-MCNC: 0.4 MG/DL (ref 0.1–1.5)
BILIRUB SERPL-MCNC: 0.4 MG/DL (ref 0.1–1.5)
BUN SERPL-MCNC: 10 MG/DL (ref 8–22)
BUN SERPL-MCNC: 11 MG/DL (ref 8–22)
BUN SERPL-MCNC: 7 MG/DL (ref 8–22)
BUN SERPL-MCNC: 9 MG/DL (ref 8–22)
CALCIUM SERPL-MCNC: 7.7 MG/DL (ref 8.5–10.5)
CALCIUM SERPL-MCNC: 7.8 MG/DL (ref 8.5–10.5)
CALCIUM SERPL-MCNC: 7.9 MG/DL (ref 8.5–10.5)
CALCIUM SERPL-MCNC: 7.9 MG/DL (ref 8.5–10.5)
CHLORIDE SERPL-SCNC: 114 MMOL/L (ref 96–112)
CHLORIDE SERPL-SCNC: 114 MMOL/L (ref 96–112)
CHLORIDE SERPL-SCNC: 117 MMOL/L (ref 96–112)
CHLORIDE SERPL-SCNC: 118 MMOL/L (ref 96–112)
CO2 SERPL-SCNC: 15 MMOL/L (ref 20–33)
CO2 SERPL-SCNC: 15 MMOL/L (ref 20–33)
CO2 SERPL-SCNC: 17 MMOL/L (ref 20–33)
CO2 SERPL-SCNC: 18 MMOL/L (ref 20–33)
CREAT SERPL-MCNC: 0.48 MG/DL (ref 0.5–1.4)
CREAT SERPL-MCNC: 0.49 MG/DL (ref 0.5–1.4)
CREAT SERPL-MCNC: 0.5 MG/DL (ref 0.5–1.4)
CREAT SERPL-MCNC: 0.54 MG/DL (ref 0.5–1.4)
EOSINOPHIL # BLD AUTO: 0.1 K/UL (ref 0–0.51)
EOSINOPHIL NFR BLD: 1.5 % (ref 0–6.9)
ERYTHROCYTE [DISTWIDTH] IN BLOOD BY AUTOMATED COUNT: 43.5 FL (ref 35.9–50)
GLOBULIN SER CALC-MCNC: 2.1 G/DL (ref 1.9–3.5)
GLOBULIN SER CALC-MCNC: 2.2 G/DL (ref 1.9–3.5)
GLUCOSE BLD-MCNC: 149 MG/DL (ref 65–99)
GLUCOSE BLD-MCNC: 163 MG/DL (ref 65–99)
GLUCOSE BLD-MCNC: 164 MG/DL (ref 65–99)
GLUCOSE BLD-MCNC: 168 MG/DL (ref 65–99)
GLUCOSE BLD-MCNC: 173 MG/DL (ref 65–99)
GLUCOSE BLD-MCNC: 174 MG/DL (ref 65–99)
GLUCOSE BLD-MCNC: 180 MG/DL (ref 65–99)
GLUCOSE BLD-MCNC: 181 MG/DL (ref 65–99)
GLUCOSE BLD-MCNC: 182 MG/DL (ref 65–99)
GLUCOSE BLD-MCNC: 183 MG/DL (ref 65–99)
GLUCOSE BLD-MCNC: 187 MG/DL (ref 65–99)
GLUCOSE BLD-MCNC: 189 MG/DL (ref 65–99)
GLUCOSE BLD-MCNC: 189 MG/DL (ref 65–99)
GLUCOSE BLD-MCNC: 190 MG/DL (ref 65–99)
GLUCOSE BLD-MCNC: 191 MG/DL (ref 65–99)
GLUCOSE BLD-MCNC: 197 MG/DL (ref 65–99)
GLUCOSE BLD-MCNC: 205 MG/DL (ref 65–99)
GLUCOSE BLD-MCNC: 210 MG/DL (ref 65–99)
GLUCOSE BLD-MCNC: 211 MG/DL (ref 65–99)
GLUCOSE BLD-MCNC: 219 MG/DL (ref 65–99)
GLUCOSE BLD-MCNC: 220 MG/DL (ref 65–99)
GLUCOSE BLD-MCNC: 221 MG/DL (ref 65–99)
GLUCOSE BLD-MCNC: >600 MG/DL (ref 65–99)
GLUCOSE SERPL-MCNC: 189 MG/DL (ref 65–99)
GLUCOSE SERPL-MCNC: 189 MG/DL (ref 65–99)
GLUCOSE SERPL-MCNC: 198 MG/DL (ref 65–99)
GLUCOSE SERPL-MCNC: 224 MG/DL (ref 65–99)
HCT VFR BLD AUTO: 35.6 % (ref 37–47)
HGB BLD-MCNC: 12.4 G/DL (ref 12–16)
IMM GRANULOCYTES # BLD AUTO: 0.04 K/UL (ref 0–0.11)
IMM GRANULOCYTES NFR BLD AUTO: 0.6 % (ref 0–0.9)
LYMPHOCYTES # BLD AUTO: 1.63 K/UL (ref 1–4.8)
LYMPHOCYTES NFR BLD: 23.8 % (ref 22–41)
MAGNESIUM SERPL-MCNC: 1.7 MG/DL (ref 1.5–2.5)
MAGNESIUM SERPL-MCNC: 2 MG/DL (ref 1.5–2.5)
MCH RBC QN AUTO: 29.5 PG (ref 27–33)
MCHC RBC AUTO-ENTMCNC: 34.8 G/DL (ref 33.6–35)
MCV RBC AUTO: 84.6 FL (ref 81.4–97.8)
MONOCYTES # BLD AUTO: 0.6 K/UL (ref 0–0.85)
MONOCYTES NFR BLD AUTO: 8.8 % (ref 0–13.4)
NEUTROPHILS # BLD AUTO: 4.44 K/UL (ref 2–7.15)
NEUTROPHILS NFR BLD: 64.9 % (ref 44–72)
NRBC # BLD AUTO: 0 K/UL
NRBC BLD-RTO: 0 /100 WBC
PHOSPHATE SERPL-MCNC: 2.1 MG/DL (ref 2.5–4.5)
PHOSPHATE SERPL-MCNC: 3.3 MG/DL (ref 2.5–4.5)
PLATELET # BLD AUTO: 194 K/UL (ref 164–446)
PMV BLD AUTO: 9.9 FL (ref 9–12.9)
POTASSIUM SERPL-SCNC: 3.2 MMOL/L (ref 3.6–5.5)
POTASSIUM SERPL-SCNC: 3.4 MMOL/L (ref 3.6–5.5)
POTASSIUM SERPL-SCNC: 3.4 MMOL/L (ref 3.6–5.5)
POTASSIUM SERPL-SCNC: 3.8 MMOL/L (ref 3.6–5.5)
PROT SERPL-MCNC: 5.2 G/DL (ref 6–8.2)
PROT SERPL-MCNC: 5.3 G/DL (ref 6–8.2)
RBC # BLD AUTO: 4.21 M/UL (ref 4.2–5.4)
SODIUM SERPL-SCNC: 139 MMOL/L (ref 135–145)
SODIUM SERPL-SCNC: 139 MMOL/L (ref 135–145)
SODIUM SERPL-SCNC: 140 MMOL/L (ref 135–145)
SODIUM SERPL-SCNC: 142 MMOL/L (ref 135–145)
WBC # BLD AUTO: 6.8 K/UL (ref 4.8–10.8)

## 2018-04-02 PROCEDURE — 700111 HCHG RX REV CODE 636 W/ 250 OVERRIDE (IP): Performed by: INTERNAL MEDICINE

## 2018-04-02 PROCEDURE — 85025 COMPLETE CBC W/AUTO DIFF WBC: CPT

## 2018-04-02 PROCEDURE — 99233 SBSQ HOSP IP/OBS HIGH 50: CPT | Performed by: INTERNAL MEDICINE

## 2018-04-02 PROCEDURE — 82962 GLUCOSE BLOOD TEST: CPT | Mod: 91

## 2018-04-02 PROCEDURE — 770022 HCHG ROOM/CARE - ICU (200)

## 2018-04-02 PROCEDURE — 700101 HCHG RX REV CODE 250: Performed by: INTERNAL MEDICINE

## 2018-04-02 PROCEDURE — A9270 NON-COVERED ITEM OR SERVICE: HCPCS | Performed by: INTERNAL MEDICINE

## 2018-04-02 PROCEDURE — 83735 ASSAY OF MAGNESIUM: CPT

## 2018-04-02 PROCEDURE — 84100 ASSAY OF PHOSPHORUS: CPT

## 2018-04-02 PROCEDURE — 80048 BASIC METABOLIC PNL TOTAL CA: CPT | Mod: 91

## 2018-04-02 PROCEDURE — 700105 HCHG RX REV CODE 258: Performed by: INTERNAL MEDICINE

## 2018-04-02 PROCEDURE — 80053 COMPREHEN METABOLIC PANEL: CPT

## 2018-04-02 PROCEDURE — 700102 HCHG RX REV CODE 250 W/ 637 OVERRIDE(OP): Performed by: INTERNAL MEDICINE

## 2018-04-02 RX ORDER — MAGNESIUM SULFATE HEPTAHYDRATE 40 MG/ML
2 INJECTION, SOLUTION INTRAVENOUS ONCE
Status: COMPLETED | OUTPATIENT
Start: 2018-04-02 | End: 2018-04-02

## 2018-04-02 RX ORDER — POTASSIUM CHLORIDE 7.45 MG/ML
10 INJECTION INTRAVENOUS
Status: COMPLETED | OUTPATIENT
Start: 2018-04-02 | End: 2018-04-02

## 2018-04-02 RX ADMIN — METOCLOPRAMIDE 10 MG: 5 INJECTION, SOLUTION INTRAMUSCULAR; INTRAVENOUS at 14:04

## 2018-04-02 RX ADMIN — ONDANSETRON 4 MG: 2 INJECTION INTRAMUSCULAR; INTRAVENOUS at 07:45

## 2018-04-02 RX ADMIN — HYDROMORPHONE HYDROCHLORIDE 1 MG: 2 INJECTION INTRAMUSCULAR; INTRAVENOUS; SUBCUTANEOUS at 21:44

## 2018-04-02 RX ADMIN — HYDROMORPHONE HYDROCHLORIDE 1 MG: 2 INJECTION INTRAMUSCULAR; INTRAVENOUS; SUBCUTANEOUS at 03:43

## 2018-04-02 RX ADMIN — POTASSIUM CHLORIDE 10 MEQ: 7.46 INJECTION, SOLUTION INTRAVENOUS at 07:19

## 2018-04-02 RX ADMIN — POTASSIUM CHLORIDE 10 MEQ: 7.46 INJECTION, SOLUTION INTRAVENOUS at 17:42

## 2018-04-02 RX ADMIN — CEFTRIAXONE SODIUM 1 G: 10 INJECTION, POWDER, FOR SOLUTION INTRAVENOUS at 09:31

## 2018-04-02 RX ADMIN — POTASSIUM CHLORIDE 10 MEQ: 7.46 INJECTION, SOLUTION INTRAVENOUS at 18:47

## 2018-04-02 RX ADMIN — HYDROMORPHONE HYDROCHLORIDE 1 MG: 2 INJECTION INTRAMUSCULAR; INTRAVENOUS; SUBCUTANEOUS at 17:23

## 2018-04-02 RX ADMIN — POTASSIUM CHLORIDE 10 MEQ: 7.46 INJECTION, SOLUTION INTRAVENOUS at 07:18

## 2018-04-02 RX ADMIN — SODIUM CHLORIDE 2 UNITS/HR: 9 INJECTION, SOLUTION INTRAVENOUS at 13:38

## 2018-04-02 RX ADMIN — HYDROMORPHONE HYDROCHLORIDE 1 MG: 2 INJECTION INTRAMUSCULAR; INTRAVENOUS; SUBCUTANEOUS at 12:35

## 2018-04-02 RX ADMIN — FAMOTIDINE 20 MG: 10 INJECTION INTRAVENOUS at 12:23

## 2018-04-02 RX ADMIN — ATORVASTATIN CALCIUM 20 MG: 20 TABLET, FILM COATED ORAL at 20:33

## 2018-04-02 RX ADMIN — HYDROMORPHONE HYDROCHLORIDE 1 MG: 2 INJECTION INTRAMUSCULAR; INTRAVENOUS; SUBCUTANEOUS at 07:48

## 2018-04-02 RX ADMIN — POTASSIUM PHOSPHATE, MONOBASIC AND POTASSIUM PHOSPHATE, DIBASIC 30 MMOL: 224; 236 INJECTION, SOLUTION INTRAVENOUS at 12:47

## 2018-04-02 RX ADMIN — POTASSIUM CHLORIDE 10 MEQ: 7.46 INJECTION, SOLUTION INTRAVENOUS at 06:06

## 2018-04-02 RX ADMIN — FAMOTIDINE 20 MG: 10 INJECTION INTRAVENOUS at 20:34

## 2018-04-02 RX ADMIN — METOCLOPRAMIDE 10 MG: 5 INJECTION, SOLUTION INTRAMUSCULAR; INTRAVENOUS at 20:34

## 2018-04-02 RX ADMIN — ONDANSETRON 4 MG: 2 INJECTION INTRAMUSCULAR; INTRAVENOUS at 03:43

## 2018-04-02 RX ADMIN — DEXTROSE AND SODIUM CHLORIDE: 10; .45 INJECTION, SOLUTION INTRAVENOUS at 06:05

## 2018-04-02 RX ADMIN — METOCLOPRAMIDE 10 MG: 5 INJECTION, SOLUTION INTRAMUSCULAR; INTRAVENOUS at 06:17

## 2018-04-02 RX ADMIN — ONDANSETRON 4 MG: 2 INJECTION INTRAMUSCULAR; INTRAVENOUS at 21:44

## 2018-04-02 RX ADMIN — ONDANSETRON 4 MG: 2 INJECTION INTRAMUSCULAR; INTRAVENOUS at 12:34

## 2018-04-02 RX ADMIN — ONDANSETRON 4 MG: 2 INJECTION INTRAMUSCULAR; INTRAVENOUS at 17:23

## 2018-04-02 RX ADMIN — POTASSIUM CHLORIDE 10 MEQ: 7.46 INJECTION, SOLUTION INTRAVENOUS at 06:05

## 2018-04-02 RX ADMIN — MAGNESIUM SULFATE HEPTAHYDRATE 2 G: 40 INJECTION, SOLUTION INTRAVENOUS at 21:48

## 2018-04-02 RX ADMIN — DEXTROSE AND SODIUM CHLORIDE: 10; .45 INJECTION, SOLUTION INTRAVENOUS at 15:50

## 2018-04-02 ASSESSMENT — ENCOUNTER SYMPTOMS
CONSTIPATION: 0
MYALGIAS: 0
HEADACHES: 0
WEAKNESS: 0
LOSS OF CONSCIOUSNESS: 0
FALLS: 0
CHILLS: 0
DEPRESSION: 0
FEVER: 0
NAUSEA: 1
VOMITING: 0
COUGH: 0
SPUTUM PRODUCTION: 0
DIARRHEA: 0
STRIDOR: 0
ABDOMINAL PAIN: 1
DIZZINESS: 0
SHORTNESS OF BREATH: 0
PALPITATIONS: 0
TINGLING: 0

## 2018-04-02 ASSESSMENT — LIFESTYLE VARIABLES
DO YOU DRINK ALCOHOL: NO
DO YOU DRINK ALCOHOL: NO
EVER_SMOKED: NEVER

## 2018-04-02 ASSESSMENT — PAIN SCALES - GENERAL
PAINLEVEL_OUTOF10: 5
PAINLEVEL_OUTOF10: 4
PAINLEVEL_OUTOF10: 8
PAINLEVEL_OUTOF10: 7
PAINLEVEL_OUTOF10: 7
PAINLEVEL_OUTOF10: 9
PAINLEVEL_OUTOF10: 4

## 2018-04-02 ASSESSMENT — COPD QUESTIONNAIRES
DO YOU EVER COUGH UP ANY MUCUS OR PHLEGM?: NO/ONLY WITH OCCASIONAL COLDS OR INFECTIONS
DURING THE PAST 4 WEEKS HOW MUCH DID YOU FEEL SHORT OF BREATH: SOME OF THE TIME
HAVE YOU SMOKED AT LEAST 100 CIGARETTES IN YOUR ENTIRE LIFE: NO/DON'T KNOW
COPD SCREENING SCORE: 1

## 2018-04-02 NOTE — PROGRESS NOTES
k from Lab called with critical result of CO2 <5 and Glucose 592 at 1215. Critical lab result read back to k.   Dr. Bravo notified of critical lab result at 1310.  Critical lab result read back by Dr. Bravo.

## 2018-04-02 NOTE — PROGRESS NOTES
Spoke with Dr. Shah about the patient vomiting immediately after receiving her oral bronson.  Dr. Shah placed orders to change from PO to IV for the time being.

## 2018-04-02 NOTE — PROGRESS NOTES
Pulmonary Critical Care Progress Note        Date of Admission:  4/1/2018    Chief Complaint: Nausea/vomiting    History of Present Illness: 27 y.o. female admitted for nausea, vomiting, found to have DKA    ROS:  Respiratory: negative, Cardiac: negative, GI: positive nausea.  All other systems negative.    Interval Events:  24 hour interval history reviewed    - a/o, NFE   - NPO, ongoing nausea and emesis    - rec'd IV dilaudid and zofran   - D10 1/2   - reglan IV   - IS 2000, room air   - home Lantus   - replacing K and phos   - C3 for UTI   -     PFSH:  No change.    Respiratory:     Pulse Oximetry: 97 %  Chest Tube Drains:          Exam: unlabored respirations, no intercostal retractions or accessory muscle use  ImagingAvailable data reviewed   Recent Labs      04/01/18   1121   [id number]  6.98*   [id number]  12.7*   [id number]  120.9*   [id number]  97.0   ARTHCO3  3*   ARTBE  -27*       HemoDynamics:  Pulse: 93, Heart Rate (Monitored): 95  NIBP: 135/72       Exam: regular rate and rhythm  Imaging: Available data reviewed        Neuro:  GCS Total Bethany Coma Score: 15       Exam: no focal deficits noted  Imaging: Available data reviewed    Fluids:  Intake/Output       03/31/18 0700 - 04/01/18 0659 (Not Admitted) 04/01/18 0700 - 04/02/18 0659 04/02/18 0700 - 04/03/18 0659      6152-4749 6716-1842 Total 6745-0328 4477-1396 Total 6502-6970 7685-5861 Total       Intake    P.O.  --  -- --  10  -- 10  --  -- --    P.O. -- -- -- 10 -- 10 -- -- --    I.V.  --  -- --  5229.7  2326 7555.7  266  -- 266    Magnesium Sulfate Volume -- -- -- -- 50 50 -- -- --    Insulin Volume -- -- -- 79.7 96 175.7 16 -- 16    IV Piggyback Volume (IV Piggyback) -- -- --  -- -- --    IV Volume (LR) -- -- -- 2000 -- 2000 -- -- --    IV Volume (NS) -- -- -- 2000 80 2080 -- -- --    IV Volume (maintenance) -- -- -- 750 1500 2250 250 -- 250    Total Intake -- -- -- 5239.7 2326 7565.7 266 -- 266       Output    Urine  --  -- --  1800  651  2500  150  -- 150    Number of Times Voided -- -- -- 0 x -- 0 x -- -- --    Indwelling Cathether -- -- -- 2975 145 9116 150 -- 150    Emesis  --  -- --  80  490 570  --  -- --    Emesis -- -- -- 80 490 570 -- -- --    Emesis - Number of Times -- -- -- 2 x -- 2 x -- -- --    Total Output -- -- -- 1880 1190 3070 150 -- 150       Net I/O     -- -- -- 3359.7 1136 4495.7 116 -- 116        Weight: 86.2 kg (190 lb 0.6 oz)  Recent Labs      18   0805   18   11218   0550   SODIUM   --    < >  141   < >  141  140  142   POTASSIUM   --    < >  4.2   < >  3.6  3.4*  3.2*   CHLORIDE   --    < >  112   < >  118*  118*  117*   CO2   --    < >  <5*   < >  11*  15*  17*   BUN   --    < >  19   < >  11  11  10   CREATININE   --    < >  0.95   < >  0.58  0.48*  0.54   MAGNESIUM  1.6   --   2.0   --    --    --   2.0   PHOSPHORUS  2.0*   --   3.7   --    --    --   2.1*   CALCIUM   --    < >  8.0*   < >  7.7*  7.9*  7.8*    < > = values in this interval not displayed.       GI/Nutrition:  Exam: abdomen is soft and non-tender  Imaging: Available data reviewed  NPO  Liver Function  Recent Labs      18   0911   18   11218   0550   ALTSGPT  19   --   19   --    --    --   12   ASTSGOT  14   --   21   --    --    --   10*   ALKPHOSPHAT  155*   --   142*   --    --    --   91   TBILIRUBIN  0.4   --   0.3   --    --    --   0.4   LIPASE   --    --   18   --    --    --    --    GLUCOSE  722*   < >  592*   < >  192*  189*  189*    < > = values in this interval not displayed.       Heme:  Recent Labs      18   0911  18   1121  18   0550   RBC  5.43*  5.16  4.21   HEMOGLOBIN  15.5  14.8  12.4   HEMATOCRIT  48.9*  47.6*  35.6*   PLATELETCT  311  294  194       Infectious Disease:  Temp  Av.9 °C (98.5 °F)  Min: 36.5 °C (97.7 °F)  Max: 37.8 °C (100 °F)  Micro: reviewed  Recent Labs      18   0911  18    1121  04/02/18   0550   WBC  14.7*  17.9*  6.8   NEUTSPOLYS  64.10  76.10*  64.90   LYMPHOCYTES  28.30  16.60*  23.80   MONOCYTES  5.40  5.20  8.80   EOSINOPHILS  0.80  0.30  1.50   BASOPHILS  0.70  0.70  0.40   ASTSGOT  14  21  10*   ALTSGPT  19  19  12   ALKPHOSPHAT  155*  142*  91   TBILIRUBIN  0.4  0.3  0.4     Current Facility-Administered Medications   Medication Dose Frequency Provider Last Rate Last Dose   • atorvastatin (LIPITOR) tablet 20 mg  20 mg QHS Ruddy Luis M.D.   20 mg at 04/01/18 2104   • dextrose 10% and 0.45% NaCl infusion   Continuous Ruddy Luis M.D. 125 mL/hr at 04/02/18 0605     • MD ALERT-PHARMACY TO CONSULT FOR DKA MONITORING 1 Each  1 Each PRRONALD Luis M.D.       • potassium phosphates 30 mmol in  mL ivpb  30 mmol Once PRN Ruddy Luis M.D.        Or   • sodium phosphate 30 mmol in 1/2  mL ivpb  30 mmol Once PRN Ruddy Luis M.D.       • Respiratory Care per Protocol   Continuous RT Ruddy Luis M.D.       • 1/2 NS infusion   Continuous Ruddy Luis M.D. 125 mL/hr at 04/01/18 1245     • D5 1/2 NS infusion   Continuous Ruddy Luis M.D. 125 mL/hr at 04/01/18 1442     • ondansetron (ZOFRAN) syringe/vial injection 4 mg  4 mg Q4HRS PRN Ruddy Luis M.D.   4 mg at 04/02/18 0343   • ondansetron (ZOFRAN ODT) dispertab 4 mg  4 mg Q4HRS PRN Ruddy Luis M.D.       • insulin regular human (HUMULIN/NOVOLIN R) 62.5 Units in  mL Infusion for DKA  4 Units/hr Continuous Ruddy Luis M.D. 8 mL/hr at 04/01/18 2235 2 Units/hr at 04/01/18 2235   • HYDROmorphone (DILAUDID) injection 0.5 mg  0.5 mg Q4HRS PRN Ml Bravo M.D.       • HYDROmorphone (DILAUDID) injection 1 mg  1 mg Q4HRS PRN Ml Bravo M.D.   1 mg at 04/02/18 0748   • cefTRIAXone (ROCEPHIN) syringe 1 g  1 g Q24HRS Ml Bravo M.D.   1 g at 04/02/18 0931   • Adult DKA potassium(K+) replacement scale  1 Each Q4HR Ruddy Luis M.D.   1 Each at 04/02/18 0100    • famotidine (PEPCID) tablet 20 mg  20 mg DAILY Ml Bravo M.D.   20 mg at 04/01/18 1833   • metoclopramide (REGLAN) injection 10 mg  10 mg Q8HRS Mary Shah M.D.   10 mg at 04/02/18 0617     Last reviewed on 4/1/2018  6:46 PM by Cami Fairchild R.N.    Quality  Measures:  Medications reviewed and Labs reviewed                      Problems:  Diabetic ketoacidosis   - Titrate insulin drip, all electrolytes and diabetic parameters reviewed in detail   - Serum bicarbonate improved. She remains acidemic but anion gap is improving.   - Maintain nothing by mouth for now   - IV fluid infusions titrated  Diabetes mellitus   - Review home regimen, treatment as above  Diabetic gastroparesis   - Continue home Reglan dose, IV currently has ongoing emesis  Critical hypokalemia   - Replete, no current arrhythmia  Hypocalcemia  Hypomagnesemia,   - Replete    Patient remains critically ill risk for further deterioration. If adjusted fluid and insulin infusions today. Discussed case in detail with the nursing staff at bedside as well as the hospitalist. Further recommendations to follow. She'll remain in the intensive care unit with ongoing insulin infusions and transitioned to subcutaneous when appropriate.  The patient remains critically ill.  Critical care time = 32 minutes in directly providing and coordinating critical care and extensive data review.  No time overlap and excludes procedures.

## 2018-04-02 NOTE — CARE PLAN
Problem: Venous Thromboembolism (VTW)/Deep Vein Thrombosis (DVT) Prevention:  Goal: Patient will participate in Venous Thrombosis (VTE)/Deep Vein Thrombosis (DVT)Prevention Measures  SCD's in place.    Problem: Skin Integrity  Goal: Risk for impaired skin integrity will decrease  Encourage every 2 hour turns.

## 2018-04-02 NOTE — PROGRESS NOTES
[facility] Hospitalist Progress Note    Date of Service: 2018    Chief Complaint  27 y.o. female admitted 2018 with nausea and vomiting.    Interval Problem Update  Upon arrival noted diabetic ketoacidosis.  Patient has had frequent admissions for diabetic ketoacidosis.  Patient seen and examined in the ICU, ICU care given.  Discussed patient condition and plan with Intensivist, RN, RT and charge nurse / hot rounds.    No overnight events  Insulin 2  d10 1/2  Alert and oriented  NSR to tach  SBP   RA  NPO, still vomiting  [place]   Afebrile  Give k and phos  Rocephin for UTI    Consultants/Specialty  Intensivist    Disposition  Patient requires additional treatment in the hospital, should be able to go home at the time of discharge        Review of Systems   Constitutional: Positive for malaise/fatigue. Negative for chills and fever.   HENT: Negative for congestion.    Respiratory: Negative for cough, sputum production, shortness of breath and stridor.    Cardiovascular: Negative for chest pain, palpitations and leg swelling.   Gastrointestinal: Positive for abdominal pain and nausea. Negative for constipation, diarrhea and vomiting.   Genitourinary: Negative for dysuria and urgency.   Musculoskeletal: Negative for falls and myalgias.   Neurological: Negative for dizziness, tingling, loss of consciousness, weakness and headaches.   Psychiatric/Behavioral: Negative for depression and suicidal ideas.   All other systems reviewed and are negative.     Physical Exam  Laboratory/Imaging   Hemodynamics  Temp (24hrs), Av.9 °C (98.4 °F), Min:36.2 °C (97.2 °F), Max:37.8 °C (100 °F)   Temperature: 36.5 °C (97.7 °F)  Pulse  Av.6  Min: 96  Max: 146 Heart Rate (Monitored): 96  Blood Pressure: 134/89, NIBP: 125/72      Respiratory      Respiration: 16, Pulse Oximetry: 96 %     Work Of Breathing / Effort: Tachypnea  RUL Breath Sounds: Clear, RML Breath Sounds: Diminished, RLL Breath Sounds: Clear, NANCY Breath  Sounds: Diminished, LLL Breath Sounds: Diminished    Fluids    Intake/Output Summary (Last 24 hours) at 04/02/18 0730  Last data filed at 04/02/18 0000   Gross per 24 hour   Intake          6271.48 ml   Output             2445 ml   Net          3826.48 ml       Nutrition  Orders Placed This Encounter   Procedures   • Diet NPO     Standing Status:   Standing     Number of Occurrences:   1     Order Specific Question:   Restrict to:     Answer:   Sips with Medications [3]     Physical Exam   Constitutional: She is oriented to person, place, and time. She appears well-developed. No distress.   HENT:   Head: Normocephalic and atraumatic.   Mouth/Throat: Oropharynx is clear and moist. No oropharyngeal exudate.   Eyes: Right eye exhibits no discharge. Left eye exhibits no discharge.   Neck: Neck supple. No tracheal deviation present.   Cardiovascular: Normal rate and regular rhythm.  Exam reveals no gallop and no friction rub.    No murmur heard.  Pulmonary/Chest: Effort normal and breath sounds normal. No stridor. No respiratory distress. She has no wheezes. She has no rales. She exhibits no tenderness.   Abdominal: Soft. Bowel sounds are normal. She exhibits no distension. There is no tenderness.   Musculoskeletal: Normal range of motion. She exhibits no edema or tenderness.   Lymphadenopathy:     She has no cervical adenopathy.   Neurological: She is alert and oriented to person, place, and time. No cranial nerve deficit.   Skin: Skin is warm and dry. No rash noted. She is not diaphoretic. No erythema.   Psychiatric: She has a normal mood and affect. Her behavior is normal. Judgment and thought content normal.   Nursing note and vitals reviewed.      Recent Labs      04/01/18   0911  04/01/18   1121  04/02/18   0550   WBC  14.7*  17.9*  6.8   RBC  5.43*  5.16  4.21   HEMOGLOBIN  15.5  14.8  12.4   HEMATOCRIT  48.9*  47.6*  35.6*   MCV  90.1  92.2  84.6   MCH  28.5  28.7  29.5   MCHC  31.7*  31.1*  34.8   RDW  45.1   47.1  43.5   PLATELETCT  311  294  194   MPV  10.4  10.4  9.9     Recent Labs      04/01/18 2005 04/02/18   0115  04/02/18   0550   SODIUM  141  140  142   POTASSIUM  3.6  3.4*  3.2*   CHLORIDE  118*  118*  117*   CO2  11*  15*  17*   GLUCOSE  192*  189*  189*   BUN  11  11  10   CREATININE  0.58  0.48*  0.54   CALCIUM  7.7*  7.9*  7.8*                      Assessment/Plan     * DKA (diabetic ketoacidoses) (CMS-HCC)- (present on admission)   Assessment & Plan    - Persists, as does her vomiting which may keep her in DKA  - Continue insulin drip  - Continue close monitoring of electrolytes, supplementation if needed  - Transition when able        Gastroparesis due to DM (CMS-HCC)- (present on admission)   Assessment & Plan    - Continue Reglan, patient does take this at home, will need to be IV at this time        Hypocalcemia   Assessment & Plan    - Has been repleted, repeat BMP in the morning        Patient non adherence- (present on admission)   Assessment & Plan    - Causing frequent episodes of diabetic ketoacidosis        Leukocytosis- (present on admission)   Assessment & Plan    - Resolved, either reactive from DKA and vomiting or due to urinary tract infection  - Continue Rocephin        UTI (urinary tract infection)- (present on admission)   Assessment & Plan    - Noted on urinalysis, await culture result  - Continue Rocephin          Quality-Core Measures   Reviewed items::  Labs reviewed, Medications reviewed and Radiology images reviewed  DVT prophylaxis - mechanical:  SCDs  Ulcer Prophylaxis::  Yes  Antibiotics:  Treating active infection/contamination beyond 24 hours perioperative coverage

## 2018-04-02 NOTE — CARE PLAN
Problem: Metabolic:  Goal: Ability to maintain appropriate glucose levels will improve    Intervention: Evaluate medication effects  Assessing blood sugar every hour and following the DKA protocol.     Goal: Diagnostic test results will improve    Intervention: Monitor diagnostic test results  BMPs every 4 hours and MDs orders followed       Problem: Urinary Elimination:  Goal: Ability to achieve and maintain adequate renal perfusion and functioning will improve    Intervention: Assess signs and symptoms of renal dysfunction  Assessing intake and output every 2 hours for adequate urine out put

## 2018-04-03 LAB
ANION GAP SERPL CALC-SCNC: 10 MMOL/L (ref 0–11.9)
ANION GAP SERPL CALC-SCNC: 10 MMOL/L (ref 0–11.9)
ANION GAP SERPL CALC-SCNC: 5 MMOL/L (ref 0–11.9)
ANION GAP SERPL CALC-SCNC: 6 MMOL/L (ref 0–11.9)
ANION GAP SERPL CALC-SCNC: 9 MMOL/L (ref 0–11.9)
BACTERIA UR CULT: NORMAL
BUN SERPL-MCNC: 4 MG/DL (ref 8–22)
BUN SERPL-MCNC: 4 MG/DL (ref 8–22)
BUN SERPL-MCNC: 6 MG/DL (ref 8–22)
BUN SERPL-MCNC: 6 MG/DL (ref 8–22)
BUN SERPL-MCNC: <5 MG/DL (ref 8–22)
CALCIUM SERPL-MCNC: 7.8 MG/DL (ref 8.5–10.5)
CALCIUM SERPL-MCNC: 7.9 MG/DL (ref 8.4–10.2)
CALCIUM SERPL-MCNC: 8 MG/DL (ref 8.4–10.2)
CALCIUM SERPL-MCNC: 8.5 MG/DL (ref 8.5–10.5)
CALCIUM SERPL-MCNC: 8.5 MG/DL (ref 8.5–10.5)
CHLORIDE SERPL-SCNC: 108 MMOL/L (ref 96–112)
CHLORIDE SERPL-SCNC: 108 MMOL/L (ref 96–112)
CHLORIDE SERPL-SCNC: 113 MMOL/L (ref 96–112)
CHLORIDE SERPL-SCNC: 114 MMOL/L (ref 96–112)
CHLORIDE SERPL-SCNC: 116 MMOL/L (ref 96–112)
CO2 SERPL-SCNC: 16 MMOL/L (ref 20–33)
CO2 SERPL-SCNC: 19 MMOL/L (ref 20–33)
CO2 SERPL-SCNC: 19 MMOL/L (ref 20–33)
CO2 SERPL-SCNC: 20 MMOL/L (ref 20–33)
CO2 SERPL-SCNC: 20 MMOL/L (ref 20–33)
CREAT SERPL-MCNC: 0.43 MG/DL (ref 0.5–1.4)
CREAT SERPL-MCNC: 0.45 MG/DL (ref 0.5–1.4)
CREAT SERPL-MCNC: 0.45 MG/DL (ref 0.5–1.4)
CREAT SERPL-MCNC: 0.48 MG/DL (ref 0.5–1.4)
CREAT SERPL-MCNC: 0.53 MG/DL (ref 0.5–1.4)
GLUCOSE BLD-MCNC: 162 MG/DL (ref 65–99)
GLUCOSE BLD-MCNC: 164 MG/DL (ref 65–99)
GLUCOSE BLD-MCNC: 183 MG/DL (ref 65–99)
GLUCOSE BLD-MCNC: 185 MG/DL (ref 65–99)
GLUCOSE BLD-MCNC: 187 MG/DL (ref 65–99)
GLUCOSE BLD-MCNC: 206 MG/DL (ref 65–99)
GLUCOSE BLD-MCNC: 210 MG/DL (ref 65–99)
GLUCOSE BLD-MCNC: 223 MG/DL (ref 65–99)
GLUCOSE BLD-MCNC: 223 MG/DL (ref 65–99)
GLUCOSE BLD-MCNC: 235 MG/DL (ref 65–99)
GLUCOSE BLD-MCNC: 236 MG/DL (ref 65–99)
GLUCOSE BLD-MCNC: 240 MG/DL (ref 65–99)
GLUCOSE BLD-MCNC: 258 MG/DL (ref 65–99)
GLUCOSE BLD-MCNC: 295 MG/DL (ref 65–99)
GLUCOSE SERPL-MCNC: 190 MG/DL (ref 65–99)
GLUCOSE SERPL-MCNC: 232 MG/DL (ref 65–99)
GLUCOSE SERPL-MCNC: 246 MG/DL (ref 65–99)
GLUCOSE SERPL-MCNC: 279 MG/DL (ref 65–99)
GLUCOSE SERPL-MCNC: 311 MG/DL (ref 65–99)
MAGNESIUM SERPL-MCNC: 2 MG/DL (ref 1.5–2.5)
PHOSPHATE SERPL-MCNC: 1.8 MG/DL (ref 2.5–4.5)
POTASSIUM SERPL-SCNC: 3.4 MMOL/L (ref 3.6–5.5)
POTASSIUM SERPL-SCNC: 3.6 MMOL/L (ref 3.6–5.5)
POTASSIUM SERPL-SCNC: 3.8 MMOL/L (ref 3.6–5.5)
POTASSIUM SERPL-SCNC: 3.8 MMOL/L (ref 3.6–5.5)
POTASSIUM SERPL-SCNC: 4 MMOL/L (ref 3.6–5.5)
SIGNIFICANT IND 70042: NORMAL
SITE SITE: NORMAL
SODIUM SERPL-SCNC: 137 MMOL/L (ref 135–145)
SODIUM SERPL-SCNC: 137 MMOL/L (ref 135–145)
SODIUM SERPL-SCNC: 138 MMOL/L (ref 135–145)
SODIUM SERPL-SCNC: 140 MMOL/L (ref 135–145)
SODIUM SERPL-SCNC: 141 MMOL/L (ref 135–145)
SOURCE SOURCE: NORMAL

## 2018-04-03 PROCEDURE — 700102 HCHG RX REV CODE 250 W/ 637 OVERRIDE(OP): Performed by: INTERNAL MEDICINE

## 2018-04-03 PROCEDURE — 770022 HCHG ROOM/CARE - ICU (200)

## 2018-04-03 PROCEDURE — 700111 HCHG RX REV CODE 636 W/ 250 OVERRIDE (IP): Performed by: INTERNAL MEDICINE

## 2018-04-03 PROCEDURE — 82962 GLUCOSE BLOOD TEST: CPT | Mod: 91

## 2018-04-03 PROCEDURE — 83735 ASSAY OF MAGNESIUM: CPT

## 2018-04-03 PROCEDURE — 80048 BASIC METABOLIC PNL TOTAL CA: CPT

## 2018-04-03 PROCEDURE — 700105 HCHG RX REV CODE 258: Performed by: INTERNAL MEDICINE

## 2018-04-03 PROCEDURE — 84100 ASSAY OF PHOSPHORUS: CPT

## 2018-04-03 PROCEDURE — 99233 SBSQ HOSP IP/OBS HIGH 50: CPT | Performed by: INTERNAL MEDICINE

## 2018-04-03 RX ORDER — METOCLOPRAMIDE HYDROCHLORIDE 5 MG/ML
10 INJECTION INTRAMUSCULAR; INTRAVENOUS
Status: DISCONTINUED | OUTPATIENT
Start: 2018-04-03 | End: 2018-04-04

## 2018-04-03 RX ORDER — SODIUM CHLORIDE, SODIUM LACTATE, POTASSIUM CHLORIDE, CALCIUM CHLORIDE 600; 310; 30; 20 MG/100ML; MG/100ML; MG/100ML; MG/100ML
INJECTION, SOLUTION INTRAVENOUS CONTINUOUS
Status: DISCONTINUED | OUTPATIENT
Start: 2018-04-03 | End: 2018-04-04

## 2018-04-03 RX ORDER — LORAZEPAM 2 MG/ML
0.5 INJECTION INTRAMUSCULAR
Status: DISCONTINUED | OUTPATIENT
Start: 2018-04-03 | End: 2018-04-05 | Stop reason: HOSPADM

## 2018-04-03 RX ORDER — POTASSIUM CHLORIDE 7.45 MG/ML
10 INJECTION INTRAVENOUS
Status: COMPLETED | OUTPATIENT
Start: 2018-04-03 | End: 2018-04-03

## 2018-04-03 RX ORDER — DEXTROSE MONOHYDRATE 25 G/50ML
25 INJECTION, SOLUTION INTRAVENOUS
Status: DISCONTINUED | OUTPATIENT
Start: 2018-04-03 | End: 2018-04-05 | Stop reason: HOSPADM

## 2018-04-03 RX ORDER — INSULIN GLARGINE 100 [IU]/ML
20 INJECTION, SOLUTION SUBCUTANEOUS 2 TIMES DAILY
Status: DISCONTINUED | OUTPATIENT
Start: 2018-04-03 | End: 2018-04-05 | Stop reason: HOSPADM

## 2018-04-03 RX ADMIN — POTASSIUM CHLORIDE 40 MEQ: 2 INJECTION, SOLUTION, CONCENTRATE INTRAVENOUS at 00:22

## 2018-04-03 RX ADMIN — DEXTROSE AND SODIUM CHLORIDE: 10; .45 INJECTION, SOLUTION INTRAVENOUS at 00:53

## 2018-04-03 RX ADMIN — METOCLOPRAMIDE 10 MG: 5 INJECTION, SOLUTION INTRAMUSCULAR; INTRAVENOUS at 11:46

## 2018-04-03 RX ADMIN — INSULIN GLARGINE 20 UNITS: 100 INJECTION, SOLUTION SUBCUTANEOUS at 20:40

## 2018-04-03 RX ADMIN — INSULIN HUMAN 5 UNITS: 100 INJECTION, SOLUTION PARENTERAL at 18:02

## 2018-04-03 RX ADMIN — LORAZEPAM 0.5 MG: 2 INJECTION INTRAMUSCULAR; INTRAVENOUS at 03:01

## 2018-04-03 RX ADMIN — ONDANSETRON 4 MG: 2 INJECTION INTRAMUSCULAR; INTRAVENOUS at 11:46

## 2018-04-03 RX ADMIN — ONDANSETRON 4 MG: 2 INJECTION INTRAMUSCULAR; INTRAVENOUS at 01:58

## 2018-04-03 RX ADMIN — FAMOTIDINE 20 MG: 10 INJECTION INTRAVENOUS at 08:59

## 2018-04-03 RX ADMIN — CEFTRIAXONE SODIUM 1 G: 10 INJECTION, POWDER, FOR SOLUTION INTRAVENOUS at 08:59

## 2018-04-03 RX ADMIN — INSULIN HUMAN 3 UNITS: 100 INJECTION, SOLUTION PARENTERAL at 20:40

## 2018-04-03 RX ADMIN — METOCLOPRAMIDE 10 MG: 5 INJECTION, SOLUTION INTRAMUSCULAR; INTRAVENOUS at 18:02

## 2018-04-03 RX ADMIN — ONDANSETRON 4 MG: 2 INJECTION INTRAMUSCULAR; INTRAVENOUS at 06:17

## 2018-04-03 RX ADMIN — HYDROMORPHONE HYDROCHLORIDE 0.5 MG: 2 INJECTION INTRAMUSCULAR; INTRAVENOUS; SUBCUTANEOUS at 23:39

## 2018-04-03 RX ADMIN — POTASSIUM CHLORIDE 10 MEQ: 7.46 INJECTION, SOLUTION INTRAVENOUS at 07:31

## 2018-04-03 RX ADMIN — ONDANSETRON 4 MG: 2 INJECTION INTRAMUSCULAR; INTRAVENOUS at 18:11

## 2018-04-03 RX ADMIN — FAMOTIDINE 20 MG: 10 INJECTION INTRAVENOUS at 20:36

## 2018-04-03 RX ADMIN — HYDROMORPHONE HYDROCHLORIDE 1 MG: 2 INJECTION INTRAMUSCULAR; INTRAVENOUS; SUBCUTANEOUS at 06:17

## 2018-04-03 RX ADMIN — HYDROMORPHONE HYDROCHLORIDE 0.5 MG: 2 INJECTION INTRAMUSCULAR; INTRAVENOUS; SUBCUTANEOUS at 14:58

## 2018-04-03 RX ADMIN — DEXTROSE AND SODIUM CHLORIDE: 10; .45 INJECTION, SOLUTION INTRAVENOUS at 08:43

## 2018-04-03 RX ADMIN — SODIUM CHLORIDE, POTASSIUM CHLORIDE, SODIUM LACTATE AND CALCIUM CHLORIDE: 600; 310; 30; 20 INJECTION, SOLUTION INTRAVENOUS at 23:26

## 2018-04-03 RX ADMIN — SODIUM CHLORIDE 2 UNITS/HR: 9 INJECTION, SOLUTION INTRAVENOUS at 09:45

## 2018-04-03 RX ADMIN — INSULIN GLARGINE 20 UNITS: 100 INJECTION, SOLUTION SUBCUTANEOUS at 11:44

## 2018-04-03 RX ADMIN — ENOXAPARIN SODIUM 40 MG: 100 INJECTION SUBCUTANEOUS at 10:37

## 2018-04-03 RX ADMIN — POTASSIUM CHLORIDE 10 MEQ: 7.46 INJECTION, SOLUTION INTRAVENOUS at 06:08

## 2018-04-03 RX ADMIN — HYDROMORPHONE HYDROCHLORIDE 1 MG: 2 INJECTION INTRAMUSCULAR; INTRAVENOUS; SUBCUTANEOUS at 01:58

## 2018-04-03 RX ADMIN — SODIUM CHLORIDE, POTASSIUM CHLORIDE, SODIUM LACTATE AND CALCIUM CHLORIDE: 600; 310; 30; 20 INJECTION, SOLUTION INTRAVENOUS at 13:52

## 2018-04-03 RX ADMIN — METOCLOPRAMIDE 10 MG: 5 INJECTION, SOLUTION INTRAMUSCULAR; INTRAVENOUS at 06:09

## 2018-04-03 ASSESSMENT — ENCOUNTER SYMPTOMS
CHILLS: 0
ABDOMINAL PAIN: 0
NAUSEA: 1
DIZZINESS: 0
HEADACHES: 0
FALLS: 0
SHORTNESS OF BREATH: 0
CONSTIPATION: 0
VOMITING: 1
STRIDOR: 0
DEPRESSION: 0
LOSS OF CONSCIOUSNESS: 0
MYALGIAS: 0
NECK PAIN: 0
SPUTUM PRODUCTION: 0
DIARRHEA: 0
PALPITATIONS: 0
FEVER: 0

## 2018-04-03 ASSESSMENT — PAIN SCALES - GENERAL
PAINLEVEL_OUTOF10: 4
PAINLEVEL_OUTOF10: 8

## 2018-04-03 ASSESSMENT — LIFESTYLE VARIABLES: DO YOU DRINK ALCOHOL: NO

## 2018-04-03 NOTE — CARE PLAN
Problem: Skin Integrity  Goal: Risk for impaired skin integrity will decrease  Outcome: PROGRESSING AS EXPECTED  No skin breakdown at this time.  Generalized edema (2+); ends of fingers with multiple stick marks and bruising; very difficult to do a vena puncture, lab drawing blood

## 2018-04-03 NOTE — PROGRESS NOTES
Patient's continues to have stable blood sugars and is within parameters to transition off of the DKA protocol.  Patient continues to have nausea and vomiting. Patient was unable to hold down a sip of water. Will continue to remain on DKA protocol until patient is able to eat (per day RN and MD note).

## 2018-04-03 NOTE — CARE PLAN
Problem: Mobility  Goal: Risk for activity intolerance will decrease  Outcome: PROGRESSING SLOWER THAN EXPECTED  Not wanting to be mobilized due to abd pain and vomiting.  Encouraged that we will mobilize to edge of bed today

## 2018-04-03 NOTE — CARE PLAN
Problem: Safety  Goal: Will remain free from falls  Outcome: PROGRESSING AS EXPECTED  Assessed patient on risk factors for falls. Educated patient on the importance of asking for assistance and using call light. Ensured patient's belongings and call light are within reach. Treaded slippers are on patient, bed alarm is on, and bed is in lowest position. Mobility signs are in place outside of patients room.     Problem: Infection  Goal: Will remain free from infection  Outcome: PROGRESSING AS EXPECTED  Assessed patient for signs and symptoms of new or worsening infection. Educated on infection prevention and encouraged patient and family to use hand hygiene before and after entering patients room. Assessed patient for removal of invasive lines and drains.

## 2018-04-03 NOTE — PROGRESS NOTES
Pulmonary Critical Care Progress Note        Date of Admission:  4/1/2018    Chief Complaint: Nausea/vomiting    History of Present Illness: 27 y.o. female admitted for nausea, vomiting, found to have DKA    ROS:  Respiratory: negative, Cardiac: negative, GI: positive nausea.  All other systems negative.    Interval Events:  24 hour interval history reviewed    - ongoing n/v   - a/o x 4, NFE   - VSS   - good UOP   - afebrile   - moderate edema/anasarca   - IS 2000   - C3 for UTI   - insulin gtt 2 u/hr, lytes reviewed    - home lantus 33 BID and Apidra - transition to 20 BID;  until PO  Yesterday:   - a/o, NFE   - NPO, ongoing nausea and emesis    - rec'd IV dilaudid and zofran   - D10 1/2   - reglan IV   - IS 2000, room air   - home Lantus   - replacing K and phos   - C3 for UTI    PFSH:  No change.    Respiratory:     Pulse Oximetry: 95 %  Chest Tube Drains:          Exam: unlabored respirations, no intercostal retractions or accessory muscle use  ImagingAvailable data reviewed   Recent Labs      04/01/18   1121   [id number]  6.98*   [id number]  12.7*   [id number]  120.9*   [id number]  97.0   ARTHCO3  3*   ARTBE  -27*       HemoDynamics:  Pulse: (!) 104, Heart Rate (Monitored): (!) 101  NIBP: 143/90       Exam: regular rate and rhythm  Imaging: Available data reviewed        Neuro:  GCS Total Rena Lara Coma Score: 15       Exam: no focal deficits noted  Imaging: Available data reviewed    Fluids:  Intake/Output       04/01/18 0700 - 04/02/18 0659 04/02/18 0700 - 04/03/18 0659 04/03/18 0700 - 04/04/18 0659      4443-9842 6501-4838 Total 5560-1759 9407-0211 Total 2349-0410 6612-8335 Total       Intake    P.O.  10  -- 10  --  -- --  --  -- --    P.O. 10 -- 10 -- -- -- -- -- --    I.V.  5229.7  2326 7555.7  2296  2021 4317  366  -- 366    Magnesium Sulfate Volume -- 50 50 -- -- -- -- -- --    Insulin Volume 79.7 96 175.7 96 96 192 16 -- 16    IV Piggyback Volume (IV Piggyback)   100 -- 100    IV  Volume (LR) 2000 -- 2000 -- -- -- -- -- --    IV Volume (NS) 2000 80 2080 -- -- -- -- -- --    IV Volume (maintenance) 750 1500 2250 1500 1500 3000 250 -- 250    Other  --  -- --  --  15 15  --  -- --    Medications (P.O./ Enteral Liquids) -- -- -- -- 15 15 -- -- --    Total Intake 5239.7 2116 7065.7 2296 2036 4332 366 -- 366       Output    Urine  1800  700 2500  840  975 1815  375  -- 375    Number of Times Voided 0 x -- 0 x -- -- -- -- -- --    Indwelling Cathether 5173 336 1364  375 -- 375    Emesis  80  490 570  150  100 250  --  -- --    Emesis 80 490 570 150 100 250 -- -- --    Emesis - Number of Times 2 x -- 2 x -- 3 x 3 x -- -- --    Stool  --  -- --  --  -- --  --  -- --    Number of Times Stooled -- -- -- -- 0 x 0 x -- -- --    Total Output 1880 1190 3070 990 1075 2065 375 -- 375       Net I/O     3359.7 1136 4495.7 9434 069 2800 -9 -- -9           Recent Labs      04/01/18   1121   04/02/18   0550  04/02/18   1050  04/02/18   1615  04/02/18   2313  04/03/18   0415   SODIUM  141   < >  142  139  139  140  141   POTASSIUM  4.2   < >  3.2*  3.4*  3.8  3.4*  3.8   CHLORIDE  112   < >  117*  114*  114*  114*  116*   CO2  <5*   < >  17*  15*  18*   --   19*   BUN  19   < >  10  9  7*   --    --    CREATININE  0.95   < >  0.54  0.50  0.49*   --    --    MAGNESIUM  2.0   --   2.0   --   1.7   --    --    PHOSPHORUS  3.7   --   2.1*   --   3.3   --    --    CALCIUM  8.0*   < >  7.8*  7.9*  7.7*   --   8.0*    < > = values in this interval not displayed.       GI/Nutrition:  Exam: abdomen is soft and non-tender  Imaging: Available data reviewed  NPO  Liver Function  Recent Labs      04/01/18   1121   04/02/18   0550  04/02/18   1050  04/02/18   1615  04/03/18   0415   ALTSGPT  19   --   12  12   --    --    ASTSGOT  21   --   10*  12   --    --    ALKPHOSPHAT  142*   --   91  95   --    --    TBILIRUBIN  0.3   --   0.4  0.4   --    --    LIPASE  18   --    --    --    --    --    GLUCOSE  592*   < >   189*  198*  224*  190*    < > = values in this interval not displayed.       Heme:  Recent Labs      18   0911  18   1121  18   0550   RBC  5.43*  5.16  4.21   HEMOGLOBIN  15.5  14.8  12.4   HEMATOCRIT  48.9*  47.6*  35.6*   PLATELETCT  311  294  194       Infectious Disease:  Temp  Av.8 °C (98.3 °F)  Min: 36.6 °C (97.8 °F)  Max: 37 °C (98.6 °F)  Micro: reviewed  Recent Labs      18   0911  18   1121  18   0550  18   1050   WBC  14.7*  17.9*  6.8   --    NEUTSPOLYS  64.10  76.10*  64.90   --    LYMPHOCYTES  28.30  16.60*  23.80   --    MONOCYTES  5.40  5.20  8.80   --    EOSINOPHILS  0.80  0.30  1.50   --    BASOPHILS  0.70  0.70  0.40   --    ASTSGOT  14  21  10*  12   ALTSGPT  19  19  12  12   ALKPHOSPHAT  155*  142*  91  95   TBILIRUBIN  0.4  0.3  0.4  0.4     Current Facility-Administered Medications   Medication Dose Frequency Provider Last Rate Last Dose   • LORazepam (ATIVAN) injection 0.5 mg  0.5 mg Q3HRS PRN Mary Shah M.D.   0.5 mg at 18 0301   • famotidine (PEPCID) injection 20 mg  20 mg BID Jalen Amanda D.OHollis   20 mg at 18 0859   • atorvastatin (LIPITOR) tablet 20 mg  20 mg QHS Ruddy Luis M.D.   20 mg at 18 2033   • dextrose 10% and 0.45% NaCl infusion   Continuous Ruddy Luis M.D. 125 mL/hr at 18 0843     • MD ALERT-PHARMACY TO CONSULT FOR DKA MONITORING 1 Each  1 Each PRRONALD Luis M.D.       • Respiratory Care per Protocol   Continuous RT Ruddy Luis M.D.       • 1/2 NS infusion   Continuous Ruddy Luis M.D. 0 mL/hr at 18 1900     • D5 1/2 NS infusion   Continuous Ruddy Luis M.D. 0 mL/hr at 18 1900     • ondansetron (ZOFRAN) syringe/vial injection 4 mg  4 mg Q4HRS PRN Ruddy Luis M.D.   4 mg at 18 0617   • ondansetron (ZOFRAN ODT) dispertab 4 mg  4 mg Q4HRS PRN Ruddy Luis M.D.       • insulin regular human (HUMULIN/NOVOLIN R) 62.5 Units in  mL  Infusion for DKA  4 Units/hr Continuous Ruddy Luis M.D. 8 mL/hr at 04/02/18 1903 2 Units/hr at 04/02/18 1903   • HYDROmorphone (DILAUDID) injection 0.5 mg  0.5 mg Q4HRS PRN Ml Bravo M.D.       • HYDROmorphone (DILAUDID) injection 1 mg  1 mg Q4HRS PRN Ml Bravo M.D.   1 mg at 04/03/18 0617   • cefTRIAXone (ROCEPHIN) syringe 1 g  1 g Q24HRS Ml Bravo M.D.   1 g at 04/03/18 0859   • Adult DKA potassium(K+) replacement scale  1 Each Q4HR Ruddy Luis M.D.   1 Each at 04/03/18 0400   • metoclopramide (REGLAN) injection 10 mg  10 mg Q8HRS Mary Shah M.D.   10 mg at 04/03/18 0609     Last reviewed on 4/1/2018  6:46 PM by Cami Fairchild R.N.    Quality  Measures:   Medications reviewed and Labs reviewed                      Problems:  Diabetic ketoacidosis   - Titrate insulin drip, transitioned to subcutaneous today   - Serum bicarbonate slowly improving   - Maintain nothing by mouth for now, ongoing emesis   - IV fluid infusions titrated, continue LR for maintenance fluids while nothing by mouth with emesis  Diabetes mellitus   - Review home regimen, treatment as above  Diabetic gastroparesis   - Continue home Reglan dose, IV currently has ongoing emesis  Critical hypokalemia   - Replete, no current arrhythmia  Hypocalcemia  Hypomagnesemia,   - Replete    Patiently followed closely in the ICU. She has a risk for further worsening and clinical deterioration.  She is critically ill. I will continue to replace critical electrolyte abnormalities and good care management as above. Critical care time 33 minutes. No overlap.

## 2018-04-03 NOTE — PROGRESS NOTES
[facility]    Date of Service: 4/3/2018    Chief Complaint  27 y.o. female admitted 2018 with nausea and vomiting.    Interval Problem Update  Upon arrival noted diabetic ketoacidosis.  Patient has had frequent admissions for diabetic ketoacidosis.  Patient seen and examined in the ICU, ICU care given.  Discussed patient condition and plan with Intensivist, RN, RT and charge nurse / hot rounds.    No overnight events  Still with frequent vomiting  Intact  -130  NSR to tach  0.5 L NC  Mobilize today  NPO  [place] with adequate uop  Afebrile   Insulin 2   on IS    Consultants/Specialty  Intensivist    Disposition  Patient requires additional treatment in the hospital, should be able to go home at the time of discharge        Review of Systems   Constitutional: Positive for malaise/fatigue. Negative for chills and fever.   HENT: Negative for congestion and hearing loss.    Respiratory: Negative for sputum production, shortness of breath and stridor.    Cardiovascular: Negative for chest pain and palpitations.   Gastrointestinal: Positive for nausea and vomiting. Negative for abdominal pain, constipation and diarrhea.   Genitourinary: Negative for dysuria, frequency and urgency.   Musculoskeletal: Negative for falls, myalgias and neck pain.   Neurological: Negative for dizziness, loss of consciousness and headaches.   Psychiatric/Behavioral: Negative for depression and suicidal ideas.   All other systems reviewed and are negative.     Physical Exam  Laboratory/Imaging   Hemodynamics  Temp (24hrs), Av.8 °C (98.3 °F), Min:36.5 °C (97.7 °F), Max:37 °C (98.6 °F)   Temperature: 36.9 °C (98.4 °F)  Pulse  Av.5  Min: 89  Max: 146 Heart Rate (Monitored): (!) 101  NIBP: 143/90      Respiratory      Respiration: 18, Pulse Oximetry: 95 %, O2 Daily Delivery Respiratory : Room Air with O2 Available     Work Of Breathing / Effort: Mild  RUL Breath Sounds: Clear, RML Breath Sounds:  Clear;Diminished, RLL Breath Sounds: Diminished, NANCY Breath Sounds: Clear, LLL Breath Sounds: Diminished    Fluids    Intake/Output Summary (Last 24 hours) at 04/03/18 0740  Last data filed at 04/03/18 0600   Gross per 24 hour   Intake             4332 ml   Output             2065 ml   Net             2267 ml       Nutrition  Orders Placed This Encounter   Procedures   • Diet NPO     Standing Status:   Standing     Number of Occurrences:   1     Order Specific Question:   Restrict to:     Answer:   Sips with Medications [3]     Physical Exam   Constitutional: She is oriented to person, place, and time. No distress.   HENT:   Head: Normocephalic and atraumatic.   Mouth/Throat: No oropharyngeal exudate.   Eyes: Right eye exhibits no discharge. Left eye exhibits no discharge. No scleral icterus.   Neck: Neck supple.   Cardiovascular: Normal rate and regular rhythm.  Exam reveals no friction rub.    No murmur heard.  Pulmonary/Chest: Effort normal and breath sounds normal. No stridor. No respiratory distress. She has no wheezes. She exhibits no tenderness.   Abdominal: Soft. Bowel sounds are normal. She exhibits no distension.   Musculoskeletal: She exhibits no edema or tenderness.   Lymphadenopathy:     She has no cervical adenopathy.   Neurological: She is alert and oriented to person, place, and time.   Skin: Skin is warm and dry. She is not diaphoretic. No erythema.   Psychiatric: She has a normal mood and affect. Her behavior is normal. Judgment normal.   Nursing note and vitals reviewed.      Recent Labs      04/01/18   0911  04/01/18   1121  04/02/18   0550   WBC  14.7*  17.9*  6.8   RBC  5.43*  5.16  4.21   HEMOGLOBIN  15.5  14.8  12.4   HEMATOCRIT  48.9*  47.6*  35.6*   MCV  90.1  92.2  84.6   MCH  28.5  28.7  29.5   MCHC  31.7*  31.1*  34.8   RDW  45.1  47.1  43.5   PLATELETCT  311  294  194   MPV  10.4  10.4  9.9     Recent Labs      04/02/18   0550  04/02/18   1050  04/02/18   1615  04/02/18   2313   04/03/18   0415   SODIUM  142  139  139  140  141   POTASSIUM  3.2*  3.4*  3.8  3.4*  3.8   CHLORIDE  117*  114*  114*  114*  116*   CO2  17*  15*  18*   --   19*   GLUCOSE  189*  198*  224*   --   190*   BUN  10  9  7*   --    --    CREATININE  0.54  0.50  0.49*   --    --    CALCIUM  7.8*  7.9*  7.7*   --   8.0*                      Assessment/Plan     * DKA (diabetic ketoacidoses) (CMS-HCC)- (present on admission)   Assessment & Plan    - Persists, as does her vomiting which may keep her in DKA  - Continue insulin drip  - Continue close monitoring of electrolytes, supplementation if needed  - Transition when able        Gastroparesis due to DM (CMS-HCC)- (present on admission)   Assessment & Plan    - Continue Reglan, patient does take this at home, will need to be IV at this time        Hypocalcemia   Assessment & Plan    - Has been repleted, repeat BMP in the morning        Patient non adherence- (present on admission)   Assessment & Plan    - Causing frequent episodes of diabetic ketoacidosis        Leukocytosis- (present on admission)   Assessment & Plan    - Resolved, either reactive from DKA and vomiting or due to urinary tract infection  - Continue Rocephin        UTI (urinary tract infection)- (present on admission)   Assessment & Plan    - Noted on urinalysis, await culture result  - Continue Rocephin          Quality-Core Measures   Reviewed items::  Labs reviewed, Medications reviewed and Radiology images reviewed  DVT prophylaxis - mechanical:  SCDs  Ulcer Prophylaxis::  Yes  Antibiotics:  Treating active infection/contamination beyond 24 hours perioperative coverage

## 2018-04-03 NOTE — CARE PLAN
Problem: Pain Management  Goal: Pain level will decrease to patient's comfort goal  Continues to have abd pain; not wanting to mobilize due pain and n/v.  Receiving dilaudid IV and zofran IV with relief immediate, then she sleeps/dozes for a couple of hours, arouses and will vomit <100ml and ask for above meds

## 2018-04-04 LAB
ANION GAP SERPL CALC-SCNC: 6 MMOL/L (ref 0–11.9)
ANION GAP SERPL CALC-SCNC: 6 MMOL/L (ref 0–11.9)
BUN SERPL-MCNC: 5 MG/DL (ref 8–22)
BUN SERPL-MCNC: 5 MG/DL (ref 8–22)
CALCIUM SERPL-MCNC: 8.1 MG/DL (ref 8.5–10.5)
CALCIUM SERPL-MCNC: 8.1 MG/DL (ref 8.5–10.5)
CHLORIDE SERPL-SCNC: 107 MMOL/L (ref 96–112)
CHLORIDE SERPL-SCNC: 108 MMOL/L (ref 96–112)
CO2 SERPL-SCNC: 23 MMOL/L (ref 20–33)
CO2 SERPL-SCNC: 26 MMOL/L (ref 20–33)
CREAT SERPL-MCNC: 0.39 MG/DL (ref 0.5–1.4)
CREAT SERPL-MCNC: 0.43 MG/DL (ref 0.5–1.4)
GLUCOSE BLD-MCNC: 154 MG/DL (ref 65–99)
GLUCOSE BLD-MCNC: 257 MG/DL (ref 65–99)
GLUCOSE BLD-MCNC: 263 MG/DL (ref 65–99)
GLUCOSE BLD-MCNC: 264 MG/DL (ref 65–99)
GLUCOSE SERPL-MCNC: 138 MG/DL (ref 65–99)
GLUCOSE SERPL-MCNC: 98 MG/DL (ref 65–99)
POTASSIUM SERPL-SCNC: 3.2 MMOL/L (ref 3.6–5.5)
POTASSIUM SERPL-SCNC: 3.4 MMOL/L (ref 3.6–5.5)
SODIUM SERPL-SCNC: 137 MMOL/L (ref 135–145)
SODIUM SERPL-SCNC: 139 MMOL/L (ref 135–145)

## 2018-04-04 PROCEDURE — 770006 HCHG ROOM/CARE - MED/SURG/GYN SEMI*

## 2018-04-04 PROCEDURE — 700111 HCHG RX REV CODE 636 W/ 250 OVERRIDE (IP): Performed by: INTERNAL MEDICINE

## 2018-04-04 PROCEDURE — 82962 GLUCOSE BLOOD TEST: CPT | Mod: 91

## 2018-04-04 PROCEDURE — 700102 HCHG RX REV CODE 250 W/ 637 OVERRIDE(OP): Performed by: INTERNAL MEDICINE

## 2018-04-04 PROCEDURE — 99233 SBSQ HOSP IP/OBS HIGH 50: CPT | Performed by: INTERNAL MEDICINE

## 2018-04-04 PROCEDURE — 80048 BASIC METABOLIC PNL TOTAL CA: CPT

## 2018-04-04 PROCEDURE — A9270 NON-COVERED ITEM OR SERVICE: HCPCS | Performed by: INTERNAL MEDICINE

## 2018-04-04 RX ORDER — METOCLOPRAMIDE 10 MG/1
10 TABLET ORAL
Status: DISCONTINUED | OUTPATIENT
Start: 2018-04-04 | End: 2018-04-05 | Stop reason: HOSPADM

## 2018-04-04 RX ADMIN — INSULIN HUMAN 5 UNITS: 100 INJECTION, SOLUTION PARENTERAL at 12:48

## 2018-04-04 RX ADMIN — METOCLOPRAMIDE HYDROCHLORIDE 10 MG: 10 TABLET ORAL at 18:07

## 2018-04-04 RX ADMIN — HYDROMORPHONE HYDROCHLORIDE 1 MG: 10 INJECTION, SOLUTION INTRAMUSCULAR; INTRAVENOUS; SUBCUTANEOUS at 20:00

## 2018-04-04 RX ADMIN — FAMOTIDINE 20 MG: 10 INJECTION INTRAVENOUS at 08:31

## 2018-04-04 RX ADMIN — ENOXAPARIN SODIUM 40 MG: 100 INJECTION SUBCUTANEOUS at 08:30

## 2018-04-04 RX ADMIN — METOCLOPRAMIDE 10 MG: 5 INJECTION, SOLUTION INTRAMUSCULAR; INTRAVENOUS at 12:48

## 2018-04-04 RX ADMIN — HYDROMORPHONE HYDROCHLORIDE 1 MG: 2 INJECTION INTRAMUSCULAR; INTRAVENOUS; SUBCUTANEOUS at 07:24

## 2018-04-04 RX ADMIN — HYDROMORPHONE HYDROCHLORIDE 0.5 MG: 2 INJECTION INTRAMUSCULAR; INTRAVENOUS; SUBCUTANEOUS at 13:25

## 2018-04-04 RX ADMIN — ATORVASTATIN CALCIUM 20 MG: 20 TABLET, FILM COATED ORAL at 19:59

## 2018-04-04 RX ADMIN — INSULIN HUMAN 5 UNITS: 100 INJECTION, SOLUTION PARENTERAL at 20:04

## 2018-04-04 RX ADMIN — METOCLOPRAMIDE 10 MG: 5 INJECTION, SOLUTION INTRAMUSCULAR; INTRAVENOUS at 08:31

## 2018-04-04 RX ADMIN — INSULIN HUMAN 2 UNITS: 100 INJECTION, SOLUTION PARENTERAL at 08:45

## 2018-04-04 RX ADMIN — CEFTRIAXONE SODIUM 1 G: 10 INJECTION, POWDER, FOR SOLUTION INTRAVENOUS at 08:30

## 2018-04-04 RX ADMIN — INSULIN GLARGINE 20 UNITS: 100 INJECTION, SOLUTION SUBCUTANEOUS at 20:05

## 2018-04-04 RX ADMIN — HYDROMORPHONE HYDROCHLORIDE 1 MG: 2 INJECTION INTRAMUSCULAR; INTRAVENOUS; SUBCUTANEOUS at 13:26

## 2018-04-04 RX ADMIN — LORAZEPAM 0.5 MG: 2 INJECTION INTRAMUSCULAR; INTRAVENOUS at 08:31

## 2018-04-04 RX ADMIN — INSULIN GLARGINE 20 UNITS: 100 INJECTION, SOLUTION SUBCUTANEOUS at 08:45

## 2018-04-04 ASSESSMENT — PAIN SCALES - GENERAL
PAINLEVEL_OUTOF10: 9
PAINLEVEL_OUTOF10: 0
PAINLEVEL_OUTOF10: 3
PAINLEVEL_OUTOF10: 4
PAINLEVEL_OUTOF10: 6
PAINLEVEL_OUTOF10: 4
PAINLEVEL_OUTOF10: 3
PAINLEVEL_OUTOF10: 8
PAINLEVEL_OUTOF10: 4
PAINLEVEL_OUTOF10: 4
PAINLEVEL_OUTOF10: 8

## 2018-04-04 ASSESSMENT — ENCOUNTER SYMPTOMS
SHORTNESS OF BREATH: 0
VOMITING: 0
CONSTIPATION: 0
NAUSEA: 1
MYALGIAS: 0
FALLS: 0
WEAKNESS: 0
STRIDOR: 0
CHILLS: 0
DEPRESSION: 0
LOSS OF CONSCIOUSNESS: 0
DIARRHEA: 0
SPUTUM PRODUCTION: 0
ABDOMINAL PAIN: 0
FEVER: 0
COUGH: 0
DIZZINESS: 0
PALPITATIONS: 0

## 2018-04-04 NOTE — PROGRESS NOTES
Pulmonary Critical Care Progress Note        Date of Admission:  4/1/2018    Chief Complaint: Nausea/vomiting    History of Present Illness: 27 y.o. female admitted for nausea, vomiting, found to have DKA    ROS:  Respiratory: negative, Cardiac: negative, GI: positive nausea.  All other systems negative.    Interval Events:  24 hour interval history reviewed    - sr/st   - normotensive   - 2+ edema   - oxygen with sleep   -    - C3 day 4 of 5   - remove gonzalez   - replace K   - lantus BID   - 1/2 breakfast, ongoing n/v    - stop IVF   - OK to floor  Yesterday:   - ongoing n/v   - a/o x 4, NFE   - VSS   - good UOP   - afebrile   - moderate edema/anasarca   - IS 2000   - C3 for UTI   - insulin gtt 2 u/hr, lytes reviewed    - home lantus 33 BID and Apidra - transition to 20 BID;  until PO      PFSH:  No change.    Respiratory:     Pulse Oximetry: 95 %  Chest Tube Drains:          Exam: unlabored respirations, no intercostal retractions or accessory muscle use  ImagingAvailable data reviewed   Recent Labs      04/01/18   1121   [id number]  6.98*   [id number]  12.7*   [id number]  120.9*   [id number]  97.0   ARTHCO3  3*   ARTBE  -27*       HemoDynamics:  Pulse: 94, Heart Rate (Monitored): 92  NIBP: 130/84       Exam: regular rate and rhythm  Imaging: Available data reviewed        Neuro:  GCS Total Jasbir Coma Score: 15       Exam: no focal deficits noted  Imaging: Available data reviewed    Fluids:  Intake/Output       04/02/18 0700 - 04/03/18 0659 04/03/18 0700 - 04/04/18 0659 04/04/18 0700 - 04/05/18 0659      4945-2446 2846-4570 Total 0226-1245 2539-9666 Total 3670-5409 5125-7734 Total       Intake    P.O.  --  -- --  --  120 120  --  -- --    P.O. -- -- -- -- 120 120 -- -- --    I.V.  2296  2021 4317  1564  1000 2564  400  -- 400    Insulin Volume 96 96 192 64 -- 64 -- -- --    IV Piggyback Volume (IV Piggyback)  100 -- 100 -- -- --    IV Volume (LR) -- -- -- -- 1000 1000 200 -- 200    IV Volume  (maintenance) 1500 1500 3000 1400 -- 1400 200 -- 200    Other  --  15 15  --  -- --  --  -- --    Medications (P.O./ Enteral Liquids) -- 15 15 -- -- -- -- -- --    Total Intake 2296 2036 4332 1564 1120 2684 400 -- 400       Output    Urine  840  975 1815  2715  880 3595  185  -- 185    Indwelling Cathether  2715 880 3595 185 -- 185    Emesis  150  100 250  50  -- 50  250  -- 250    Emesis 150 100 250 50 -- 50 250 -- 250    Emesis - Number of Times -- 3 x 3 x 2 x -- 2 x -- -- --    Stool  --  -- --  --  -- --  --  -- --    Number of Times Stooled -- 0 x 0 x -- 0 x 0 x 0 x -- 0 x    Total Output 990 1075 2065 2765 880 3645 435 -- 435       Net I/O     0350 733 5547 -1201 240 -961 -35 -- -35           Recent Labs      04/02/18   0550   04/02/18   1615   04/03/18   0415   04/03/18   2034  04/04/18   0051  04/04/18   0452   SODIUM  142   < >  139   < >  141   < >  138  137  139   POTASSIUM  3.2*   < >  3.8   < >  3.8   < >  3.6  3.4*  3.2*   CHLORIDE  117*   < >  114*   < >  116*   < >  108  108  107   CO2  17*   < >  18*   < >  19*   < >  20  23  26   BUN  10   < >  7*   < >  6*   < >  4*  5*  5*   CREATININE  0.54   < >  0.49*   < >  0.53   < >  0.45*  0.43*  0.39*   MAGNESIUM  2.0   --   1.7   --   2.0   --    --    --    --    PHOSPHORUS  2.1*   --   3.3   --   1.8*   --    --    --    --    CALCIUM  7.8*   < >  7.7*   < >  8.0*   < >  8.5  8.1*  8.1*    < > = values in this interval not displayed.       GI/Nutrition:  Exam: abdomen is soft and non-tender  Imaging: Available data reviewed  NPO  Liver Function  Recent Labs      04/01/18   1121   04/02/18   0550  04/02/18   1050   04/03/18   2034  04/04/18   0051  04/04/18   0452   ALTSGPT  19   --   12  12   --    --    --    --    ASTSGOT  21   --   10*  12   --    --    --    --    ALKPHOSPHAT  142*   --   91  95   --    --    --    --    TBILIRUBIN  0.3   --   0.4  0.4   --    --    --    --    LIPASE  18   --    --    --    --    --    --    --     GLUCOSE  592*   < >  189*  198*   < >  246*  138*  98    < > = values in this interval not displayed.       Heme:  Recent Labs      18   1121  18   0550   RBC  5.16  4.21   HEMOGLOBIN  14.8  12.4   HEMATOCRIT  47.6*  35.6*   PLATELETCT  294  194       Infectious Disease:  Temp  Av.9 °C (98.4 °F)  Min: 36.6 °C (97.9 °F)  Max: 37.2 °C (98.9 °F)  Micro: reviewed  Recent Labs      18   1121  18   0550  18   1050   WBC  17.9*  6.8   --    NEUTSPOLYS  76.10*  64.90   --    LYMPHOCYTES  16.60*  23.80   --    MONOCYTES  5.20  8.80   --    EOSINOPHILS  0.30  1.50   --    BASOPHILS  0.70  0.40   --    ASTSGOT  21  10*  12   ALTSGPT  19  12  12   ALKPHOSPHAT  142*  91  95   TBILIRUBIN  0.3  0.4  0.4     Current Facility-Administered Medications   Medication Dose Frequency Provider Last Rate Last Dose   • LORazepam (ATIVAN) injection 0.5 mg  0.5 mg Q3HRS PRN Mary Shah M.D.   0.5 mg at 18 0831   • enoxaparin (LOVENOX) inj 40 mg  40 mg DAILY Jalen Amanda D.O.   40 mg at 18 0830   • insulin glargine (LANTUS) injection 20 Units  20 Units BID Rolando Mae M.D.   20 Units at 18 0845   • insulin regular (HUMULIN R) injection 2-9 Units  2-9 Units 4X/DAY ACHS Rolando Mae M.D.   2 Units at 18 0845    And   • glucose 4 g chewable tablet 16 g  16 g Q15 MIN PRN Rolando Mae M.D.        And   • dextrose 50% (D50W) injection 25 mL  25 mL Q15 MIN PRN Rolando Mae M.D.       • metoclopramide (REGLAN) injection 10 mg  10 mg TID AC Mary Shah M.D.   10 mg at 18 0831   • lactated ringers infusion   Continuous Rolando Mae M.D. 100 mL/hr at 18 2326     • famotidine (PEPCID) injection 20 mg  20 mg BID Jalen Amanda D.O.   20 mg at 18 0831   • atorvastatin (LIPITOR) tablet 20 mg  20 mg QHS Ruddy Luis M.D.   Stopped at 18 2100   • Respiratory Care per Protocol   Continuous RT Ruddy Luis M.D.       • ondansetron  (ZOFRAN) syringe/vial injection 4 mg  4 mg Q4HRS PRN Ruddy Luis M.D.   4 mg at 04/03/18 1811   • ondansetron (ZOFRAN ODT) dispertab 4 mg  4 mg Q4HRS PRN Ruddy Luis M.D.       • HYDROmorphone (DILAUDID) injection 0.5 mg  0.5 mg Q4HRS PRN Ml Bravo M.D.   0.5 mg at 04/03/18 2339   • HYDROmorphone (DILAUDID) injection 1 mg  1 mg Q4HRS PRN Ml Bravo M.D.   1 mg at 04/04/18 0724   • cefTRIAXone (ROCEPHIN) syringe 1 g  1 g Q24HRS Ml Bravo M.D.   1 g at 04/04/18 0830     Last reviewed on 4/1/2018  6:46 PM by Cami Fairchild R.N.    Quality  Measures:  Medications reviewed and Labs reviewed                      Problems:  Diabetic ketoacidosis   - Titrate insulin drip, transitioned to subcutaneous today   - Serum bicarbonate improving   - advance diet as improving emesis   - plan to stop maintenance fluids   Diabetes mellitus   - Reviewed home regimen, treatment as above  Diabetic gastroparesis   - Continue home Reglan dose, improving emesis  Critical hypokalemia   - Replete, no current arrhythmia  Hypocalcemia  Hypomagnesemia,   - Replete

## 2018-04-04 NOTE — CARE PLAN
Problem: Pain Management  Goal: Pain level will decrease to patient's comfort goal    Intervention: Follow pain managment plan developed in collaboration with patient and Interdisciplinary Team  Abdominal pain management practiced with ativan/dilaudid and repositioning

## 2018-04-04 NOTE — DIETARY
"Nutrition services: Day 3 of admit.  Alis Sparks is a 27 y.o. female with admitting DX of DKA  Pt noted with nutrition admit screen trigger: poor po PTA     Spoke w/pt at bedside, she appears well nourished. She states she feel her appetite cachorro improving.  She was recently transitioned off the DKA protocol and is now receiving a diabetic diet with improving po intake per ADLs.  She reports her appetite was decreased for \"a day or two\" before she was admitted d/t not feeling well.  She denies having any issues chewing or swallowing.     Discussed wt hx; pt denies having any recent wt changes.  She does report her wt fluctuates but lately it has been around 180# d/t \"not being able to do much.\"     Assessment:  Height: 65\"   Weight: 86.2 kg (190 lb 0.6 oz) per bed scale 4/1   Body mass index is 31.62 kg/m². - obese, class I   Diet/Intake: Diabetic - per ADLs good po; consumed % of breakfast     Evaluation:   1. Offered to review DM diet education, pt declined   2. Pertinent meds and labs reviewed   3. Last BM PTA - pt reports having some abd pain but she denies having any issues moving her bowels     Recommendations/Plan:  1. Encourage po itnake   2. Document intake of all po intake as % taken in ADL's to provide interdisciplinary communication across all shifts.   3. Monitor weight.  4. Nutrition rep will continue to see patient for ongoing meal and snack preferences.     Due to good po intake and no recent wt changes; RD will con't to monitor per dept policy            "

## 2018-04-04 NOTE — PROGRESS NOTES
[facility]    Date of Service: 2018    Chief Complaint  27 y.o. female admitted 2018 with nausea and vomiting.    Interval Problem Update  Upon arrival noted diabetic ketoacidosis.  Patient has had frequent admissions for diabetic ketoacidosis.  Patient seen and examined in the ICU, ICU care given.  Discussed patient condition and plan with Intensivist, RN, RT and charge nurse / hot rounds.    No overnight events  NSR to tach  -140  0.5 L NC while sleeping  Tolerating diet  Last bm prior to arrival  LR at 100  Refused mobilizing   1000 on IS  Give k  Still vomiting  Stop ivf     Consultants/Specialty  Intensivist    Disposition  Patient requires additional treatment in the hospital, should be able to go home at the time of discharge        Review of Systems   Constitutional: Positive for malaise/fatigue. Negative for chills and fever.   HENT: Negative for congestion.    Respiratory: Negative for cough, sputum production, shortness of breath and stridor.    Cardiovascular: Negative for chest pain, palpitations and leg swelling.   Gastrointestinal: Positive for nausea. Negative for abdominal pain, constipation, diarrhea and vomiting.   Genitourinary: Negative for dysuria and urgency.   Musculoskeletal: Negative for falls and myalgias.   Neurological: Negative for dizziness, loss of consciousness and weakness.   Psychiatric/Behavioral: Negative for depression and suicidal ideas.   All other systems reviewed and are negative.     Physical Exam  Laboratory/Imaging   Hemodynamics  Temp (24hrs), Av.9 °C (98.5 °F), Min:36.7 °C (98.1 °F), Max:37.2 °C (98.9 °F)   Temperature: 37.2 °C (98.9 °F)  Pulse  Av.1  Min: 88  Max: 146 Heart Rate (Monitored): 92  NIBP: 130/84      Respiratory      Respiration: 12, Pulse Oximetry: 95 %        RUL Breath Sounds: Clear, RML Breath Sounds: Clear, RLL Breath Sounds: Diminished, NANCY Breath Sounds: Clear, LLL Breath Sounds:  Diminished    Fluids    Intake/Output Summary (Last 24 hours) at 04/04/18 0741  Last data filed at 04/04/18 0600   Gross per 24 hour   Intake             2584 ml   Output             3645 ml   Net            -1061 ml       Nutrition  Orders Placed This Encounter   Procedures   • DIET ORDER     Standing Status:   Standing     Number of Occurrences:   1     Order Specific Question:   Diet:     Answer:   Diabetic [3]     Physical Exam   Constitutional: She is oriented to person, place, and time. She appears well-developed. No distress.   HENT:   Head: Normocephalic and atraumatic.   Eyes: Right eye exhibits no discharge. Left eye exhibits no discharge.   Neck: Neck supple.   Cardiovascular: Normal rate and regular rhythm.    No murmur heard.  Pulmonary/Chest: Effort normal and breath sounds normal. No stridor. No respiratory distress. She has no wheezes. She has no rales. She exhibits no tenderness.   Abdominal: Soft. Bowel sounds are normal.   Musculoskeletal: Normal range of motion. She exhibits edema. She exhibits no tenderness.   Neurological: She is alert and oriented to person, place, and time. No cranial nerve deficit.   Skin: Skin is warm and dry. She is not diaphoretic. No erythema.   Psychiatric: She has a normal mood and affect. Judgment normal.   Nursing note and vitals reviewed.      Recent Labs      04/01/18   0911  04/01/18   1121  04/02/18   0550   WBC  14.7*  17.9*  6.8   RBC  5.43*  5.16  4.21   HEMOGLOBIN  15.5  14.8  12.4   HEMATOCRIT  48.9*  47.6*  35.6*   MCV  90.1  92.2  84.6   MCH  28.5  28.7  29.5   MCHC  31.7*  31.1*  34.8   RDW  45.1  47.1  43.5   PLATELETCT  311  294  194   MPV  10.4  10.4  9.9     Recent Labs      04/03/18   2034  04/04/18   0051  04/04/18   0452   SODIUM  138  137  139   POTASSIUM  3.6  3.4*  3.2*   CHLORIDE  108  108  107   CO2  20  23  26   GLUCOSE  246*  138*  98   BUN  4*  5*  5*   CREATININE  0.45*  0.43*  0.39*   CALCIUM  8.5  8.1*  8.1*                       Assessment/Plan     * DKA (diabetic ketoacidoses) (CMS-HCC)- (present on admission)   Assessment & Plan    -Resolved, has now transitioned  - Continue Lantus and insulin sliding scale  - Nausea vomiting are much improved, patient is now tolerating a diet        Gastroparesis due to DM (CMS-HCC)- (present on admission)   Assessment & Plan    - Continue Reglan, patient does take this at home        Hypocalcemia   Assessment & Plan    - Has been repleted, repeat BMP in the morning        Patient non adherence- (present on admission)   Assessment & Plan    - Causing frequent episodes of diabetic ketoacidosis        Leukocytosis- (present on admission)   Assessment & Plan    - Resolved, either reactive from DKA and vomiting or due to urinary tract infection  - Continue Rocephin        UTI (urinary tract infection)- (present on admission)   Assessment & Plan    - Noted on urinalysis, await culture result  - Continue Rocephin          Quality-Core Measures   Reviewed items::  Labs reviewed, Medications reviewed and Radiology images reviewed  DVT prophylaxis pharmacological::  Enoxaparin (Lovenox)  DVT prophylaxis - mechanical:  SCDs  Ulcer Prophylaxis::  Yes  Antibiotics:  Treating active infection/contamination beyond 24 hours perioperative coverage

## 2018-04-04 NOTE — CARE PLAN
Problem: Safety  Goal: Will remain free from injury  Outcome: PROGRESSING AS EXPECTED  Pt demonstrates learning/safety by consistent use of call light. Bed in lowest/locked position with lower bed rails up and in direct view of nurses station.

## 2018-04-04 NOTE — CARE PLAN
Problem: Safety  Goal: Will remain free from injury  Outcome: PROGRESSING AS EXPECTED      Problem: Respiratory:  Goal: Respiratory status will improve  Outcome: PROGRESSING SLOWER THAN EXPECTED  Assessed and monitored patients pulmonary status, titrated oxygen as needed, educated and encouraged patient to use incentive spirometer and deep breath/cough. Patient refusing to use IS.     Problem: Mobility  Goal: Risk for activity intolerance will decrease  Outcome: PROGRESSING SLOWER THAN EXPECTED  Assessed patient for signs of mobility intolerance. Discussed with patient the importance of mobilization and healing. Encouraged patient to mobilize. Patient continues to refuse mobilization due to nausea and vomiting even after medication administration.     Problem: Respiratory:  Goal: Respiratory status will improve  Outcome: PROGRESSING SLOWER THAN EXPECTED  Assessed and monitored patients pulmonary status, titrated oxygen as needed, educated and encouraged patient to use incentive spirometer and deep breath/cough. Patient refusing to use IS.

## 2018-04-05 VITALS
TEMPERATURE: 97 F | SYSTOLIC BLOOD PRESSURE: 137 MMHG | DIASTOLIC BLOOD PRESSURE: 91 MMHG | HEIGHT: 65 IN | OXYGEN SATURATION: 98 % | WEIGHT: 188.93 LBS | RESPIRATION RATE: 16 BRPM | HEART RATE: 85 BPM | BODY MASS INDEX: 31.48 KG/M2

## 2018-04-05 PROBLEM — E11.10 DKA (DIABETIC KETOACIDOSES): Status: RESOLVED | Noted: 2017-11-07 | Resolved: 2018-04-05

## 2018-04-05 PROBLEM — D72.829 LEUKOCYTOSIS: Status: RESOLVED | Noted: 2017-09-20 | Resolved: 2018-04-05

## 2018-04-05 PROBLEM — E83.51 HYPOCALCEMIA: Status: RESOLVED | Noted: 2018-04-01 | Resolved: 2018-04-05

## 2018-04-05 LAB
ANION GAP SERPL CALC-SCNC: 9 MMOL/L (ref 0–11.9)
BUN SERPL-MCNC: 14 MG/DL (ref 8–22)
CALCIUM SERPL-MCNC: 8.8 MG/DL (ref 8.5–10.5)
CHLORIDE SERPL-SCNC: 104 MMOL/L (ref 96–112)
CO2 SERPL-SCNC: 24 MMOL/L (ref 20–33)
CREAT SERPL-MCNC: 0.61 MG/DL (ref 0.5–1.4)
GLUCOSE BLD-MCNC: 157 MG/DL (ref 65–99)
GLUCOSE SERPL-MCNC: 244 MG/DL (ref 65–99)
POTASSIUM SERPL-SCNC: 3.6 MMOL/L (ref 3.6–5.5)
SODIUM SERPL-SCNC: 137 MMOL/L (ref 135–145)

## 2018-04-05 PROCEDURE — 700111 HCHG RX REV CODE 636 W/ 250 OVERRIDE (IP): Performed by: INTERNAL MEDICINE

## 2018-04-05 PROCEDURE — 80048 BASIC METABOLIC PNL TOTAL CA: CPT

## 2018-04-05 PROCEDURE — 700102 HCHG RX REV CODE 250 W/ 637 OVERRIDE(OP): Performed by: INTERNAL MEDICINE

## 2018-04-05 PROCEDURE — 99239 HOSP IP/OBS DSCHRG MGMT >30: CPT | Performed by: INTERNAL MEDICINE

## 2018-04-05 PROCEDURE — 82962 GLUCOSE BLOOD TEST: CPT

## 2018-04-05 PROCEDURE — A9270 NON-COVERED ITEM OR SERVICE: HCPCS | Performed by: INTERNAL MEDICINE

## 2018-04-05 PROCEDURE — 36415 COLL VENOUS BLD VENIPUNCTURE: CPT

## 2018-04-05 RX ADMIN — CEFTRIAXONE SODIUM 1 G: 10 INJECTION, POWDER, FOR SOLUTION INTRAVENOUS at 09:25

## 2018-04-05 RX ADMIN — METOCLOPRAMIDE HYDROCHLORIDE 10 MG: 10 TABLET ORAL at 06:19

## 2018-04-05 RX ADMIN — HYDROMORPHONE HYDROCHLORIDE 0.5 MG: 10 INJECTION, SOLUTION INTRAMUSCULAR; INTRAVENOUS; SUBCUTANEOUS at 06:18

## 2018-04-05 RX ADMIN — INSULIN GLARGINE 20 UNITS: 100 INJECTION, SOLUTION SUBCUTANEOUS at 09:23

## 2018-04-05 RX ADMIN — INSULIN HUMAN 2 UNITS: 100 INJECTION, SOLUTION PARENTERAL at 06:19

## 2018-04-05 RX ADMIN — ENOXAPARIN SODIUM 40 MG: 100 INJECTION SUBCUTANEOUS at 09:19

## 2018-04-05 ASSESSMENT — PAIN SCALES - GENERAL
PAINLEVEL_OUTOF10: 6
PAINLEVEL_OUTOF10: 2

## 2018-04-05 ASSESSMENT — LIFESTYLE VARIABLES: EVER_SMOKED: NEVER

## 2018-04-05 NOTE — CARE PLAN
Problem: Communication  Goal: The ability to communicate needs accurately and effectively will improve  Outcome: PROGRESSING AS EXPECTED  Patient able to make needs known    Problem: Pain Management  Goal: Pain level will decrease to patient's comfort goal  Outcome: PROGRESSING AS EXPECTED  Dilaudid IV given for abdominal pain

## 2018-04-05 NOTE — PROGRESS NOTES
Patient dc home with family wheel chair escort aox3 no concern or complaints all dc instruction given and reviewed with patient .  No pain or discomfort.  Patient left with family member by wheel chair

## 2018-04-05 NOTE — PROGRESS NOTES
2 RN skin check completed with Mulu. Pt has dry heels and toes bilaterally, Scabs on BLE. The rest of the skin is intact.     . RN held insulin until meal tray is delivered. RN called [facility] and asked unit clerk to send meal tray up to the floor. Per unit clerk meal trays have not been delivered to their unit but  Will send the tray up when they have it. RN informed pt, pt verbalized understanding.

## 2018-04-05 NOTE — PROGRESS NOTES
Received report from day shift RN and assumed care. Patient is A+Ox4, pleasant and cooperative. She is up self, diabetic diet. She uses call light appropriately and can make needs known. She c/o 8/10 abdominal pain and is medicated with dilaudid 1mg PIV. Blood sugars are being monitored along with nightly blood work. No other concerns at this time. Bed is locked and in lowest position, treaded socks on, call light and belongings within reach. Hourly rounding in place.

## 2018-04-05 NOTE — DISCHARGE INSTRUCTIONS
Discharge Instructions    Discharged to home by car with relative. Discharged via wheelchair, hospital escort: Yes.  Special equipment needed: Not Applicable    Be sure to schedule a follow-up appointment with your primary care doctor or any specialists as instructed.     Discharge Plan:   Diet Plan: Discussed  Activity Level: Discussed  Confirmed Follow up Appointment: Patient to Call and Schedule Appointment  Confirmed Symptoms Management: Discussed  Medication Reconciliation Updated: Yes  Influenza Vaccine Indication: Not indicated: Previously immunized this influenza season and > 8 years of age    I understand that a diet low in cholesterol, fat, and sodium is recommended for good health. Unless I have been given specific instructions below for another diet, I accept this instruction as my diet prescription.   Other diet: Diabetic     Special Instructions: None    · Is patient discharged on Warfarin / Coumadin?   No     Depression / Suicide Risk    As you are discharged from this [facility] facility, it is important to learn how to keep safe from harming yourself.    Recognize the warning signs:  · Abrupt changes in personality, positive or negative- including increase in energy   · Giving away possessions  · Change in eating patterns- significant weight changes-  positive or negative  · Change in sleeping patterns- unable to sleep or sleeping all the time   · Unwillingness or inability to communicate  · Depression  · Unusual sadness, discouragement and loneliness  · Talk of wanting to die  · Neglect of personal appearance   · Rebelliousness- reckless behavior  · Withdrawal from people/activities they love  · Confusion- inability to concentrate     If you or a loved one observes any of these behaviors or has concerns about self-harm, here's what you can do:  · Talk about it- your feelings and reasons for harming yourself  · Remove any means that you might use to hurt yourself (examples: pills, rope, extension  cords, firearm)  · Get professional help from the community (Mental Health, Substance Abuse, psychological counseling)  · Do not be alone:Call your Safe Contact- someone whom you trust who will be there for you.  · Call your local CRISIS HOTLINE 308-2640 or 877-340-4276  · Call your local Children's Mobile Crisis Response Team [place] (665) 524-4698 or www.[employer]  · Call the toll free National Suicide Prevention Hotlines   · National Suicide Prevention Lifeline 323-285-GYLV (4127)  · [place] [facility] Network 800-SUICIDE (903-0317)        Diabetic Ketoacidosis  Diabetic ketoacidosis is a life-threatening complication of diabetes. If it is not treated, it can cause severe dehydration and organ damage and can lead to a coma or death.  What are the causes?  This condition develops when there is not enough of the hormone insulin in the body. Insulin helps the body to break down sugar for energy. Without insulin, the body cannot break down sugar, so it breaks down fats instead. This leads to the production of acids that are called ketones. Ketones are poisonous at high levels.  This condition can be triggered by:  · Stress on the body that is brought on by an illness.  · Medicines that raise blood glucose levels.  · Not taking diabetes medicine.  What are the signs or symptoms?  Symptoms of this condition include:  · Fatigue.  · Weight loss.  · Excessive thirst.  · Light-headedness.  · Fruity or sweet-smelling breath.  · Excessive urination.  · Vision changes.  · Confusion or irritability.  · Nausea.  · Vomiting.  · Rapid breathing.  · Abdominal pain.  · Feeling flushed.  How is this diagnosed?  This condition is diagnosed based on a medical history, a physical exam, and blood tests. You may also have a urine test that checks for ketones.  How is this treated?  This condition may be treated with:  · Fluid replacement. This may be done to correct dehydration.  · Insulin injections. These may be given  through the skin or through an IV tube.  · Electrolyte replacement. Electrolytes, such as potassium and sodium, may be given in pill form or through an IV tube.  · Antibiotic medicines. These may be prescribed if your condition was caused by an infection.  Follow these instructions at home:  Eating and drinking  · Drink enough fluids to keep your urine clear or pale yellow.  · If you cannot eat, alternate between drinking fluids with sugar (such as juice) and salty fluids (such as broth or bouillon).  · If you can eat, follow your usual diet and drink sugar-free liquids, such as water.  Other Instructions   · Take insulin as directed by your health care provider. Do not skip insulin injections. Do not use  insulin.  · If your blood sugar is over 240 mg/dL, monitor your urine ketones every 4-6 hours.  · If you were prescribed an antibiotic medicine, finish all of it even if you start to feel better.  · Rest and exercise only as directed by your health care provider.  · If you get sick, call your health care provider and begin treatment quickly. Your body often needs extra insulin to fight an illness.  · Check your blood glucose levels regularly. If your blood glucose is high, drink plenty of fluids. This helps to flush out ketones.  Contact a health care provider if:  · Your blood glucose level is too high or too low.  · You have ketones in your urine.  · You have a fever.  · You cannot eat.  · You cannot tolerate fluids.  · You have been vomiting for more than 2 hours.  · You continue to have symptoms of this condition.  · You develop new symptoms.  Get help right away if:  · Your blood glucose levels continue to be high (elevated).  · Your monitor reads “high” even when you are taking insulin.  · You faint.  · You have chest pain.  · You have trouble breathing.  · You have a sudden, severe headache.  · You have sudden weakness in one arm or one leg.  · You have sudden trouble speaking or swallowing.  · You  have vomiting or diarrhea that gets worse after 3 hours.  · You feel severely fatigued.  · You have trouble thinking.  · You have abdominal pain.  · You are severely dehydrated. Symptoms of severe dehydration include:  ¨ Extreme thirst.  ¨ Dry mouth.  ¨ Blue lips.  ¨ Cold hands and feet.  ¨ Rapid breathing.  This information is not intended to replace advice given to you by your health care provider. Make sure you discuss any questions you have with your health care provider.  Document Released: 12/15/2001 Document Revised: 05/25/2017 Document Reviewed: 11/25/2015  [employer] Interactive Patient Education © 2017 [employer]    Diabetes and Foot Care  Diabetes may cause you to have problems because of poor blood supply (circulation) to your feet and legs. This may cause the skin on your feet to become thinner, break easier, and heal more slowly. Your skin may become dry, and the skin may peel and crack. You may also have nerve damage in your legs and feet causing decreased feeling in them. You may not notice minor injuries to your feet that could lead to infections or more serious problems. Taking care of your feet is one of the most important things you can do for yourself.  Follow these instructions at home:  · Wear shoes at all times, even in the house. Do not go barefoot. Bare feet are easily injured.  · Check your feet daily for blisters, cuts, and redness. If you cannot see the bottom of your feet, use a mirror or ask someone for help.  · Wash your feet with warm water (do not use hot water) and mild soap. Then pat your feet and the areas between your toes until they are completely dry. Do not soak your feet as this can dry your skin.  · Apply a moisturizing lotion or petroleum jelly (that does not contain alcohol and is unscented) to the skin on your feet and to dry, brittle toenails. Do not apply lotion between your toes.  · Trim your toenails straight across. Do not dig under them or around the cuticle. File  the edges of your nails with an emery board or nail file.  · Do not cut corns or calluses or try to remove them with medicine.  · Wear clean socks or stockings every day. Make sure they are not too tight. Do not wear knee-high stockings since they may decrease blood flow to your legs.  · Wear shoes that fit properly and have enough cushioning. To break in new shoes, wear them for just a few hours a day. This prevents you from injuring your feet. Always look in your shoes before you put them on to be sure there are no objects inside.  · Do not cross your legs. This may decrease the blood flow to your feet.  · If you find a minor scrape, cut, or break in the skin on your feet, keep it and the skin around it clean and dry. These areas may be cleansed with mild soap and water. Do not cleanse the area with peroxide, alcohol, or iodine.  · When you remove an adhesive bandage, be sure not to damage the skin around it.  · If you have a wound, look at it several times a day to make sure it is healing.  · Do not use heating pads or hot water bottles. They may burn your skin. If you have lost feeling in your feet or legs, you may not know it is happening until it is too late.  · Make sure your health care provider performs a complete foot exam at least annually or more often if you have foot problems. Report any cuts, sores, or bruises to your health care provider immediately.  Contact a health care provider if:  · You have an injury that is not healing.  · You have cuts or breaks in the skin.  · You have an ingrown nail.  · You notice redness on your legs or feet.  · You feel burning or tingling in your legs or feet.  · You have pain or cramps in your legs and feet.  · Your legs or feet are numb.  · Your feet always feel cold.  Get help right away if:  · There is increasing redness, swelling, or pain in or around a wound.  · There is a red line that goes up your leg.  · Pus is coming from a wound.  · You develop a fever or as  directed by your health care provider.  · You notice a bad smell coming from an ulcer or wound.  This information is not intended to replace advice given to you by your health care provider. Make sure you discuss any questions you have with your health care provider.  Document Released: 12/15/2001 Document Revised: 05/25/2017 Document Reviewed: 05/27/2014  [employer] Interactive Patient Education © 2017 Elsevier Inc.

## 2018-04-05 NOTE — DISCHARGE SUMMARY
DISCHARGE SUMMARY     ADMIT DATE:  4/1/2018         DISCHARGE DATE:  4/5/2018    PATIENT ID:  Name: Alis Sparks   YOB: 1990  Age: 27 y.o. female   MRN: [id number]  Address: [address] [place] [address]  Phone: [phone] (home)    DISCHARGE DIAGNOSIS:  Diabetic Ketoacidosis  Diabetic Gastroparesis  Medical Noncompliance  Hypocalcemia  Reactive Leukocytosis    CONSULTANTS:  Pulmonary/Critical Care    CONDITION:Stable    DISPOSITION:Home    DIET: ADA    ACTIVITY: As tolerated     HPI/HOSPITAL COURSE:  Please see H&P for details regarding initial hospital presentation.  In summary, 28yo F with PMHx of DM I was admitted for DKA.  Patient was treated in the ICU under DKA protocol until resolution.  Patient was subsequently started on diet and outpatient insulin regimen.  Patient was also suspected of having UTI based on UA results.  UCx was obtained and remained negative to date.  Patient completed 3d course of IV antibiotics during hospital course.  Patient was educated on the importance of medical compliance.  At this time, patient is stable for discharge home.      Physical exam:  Vitals:    04/04/18 1600 04/04/18 1920 04/05/18 0330 04/05/18 0728   BP:  133/81 128/69 137/91   Pulse: 99 88 86 85   Resp: 19 16 16 16   Temp:  36.3 °C (97.3 °F) 36.1 °C (97 °F) 36.1 °C (97 °F)   SpO2: 93% 96% 96% 98%   Weight:  85.7 kg (188 lb 15 oz)     Height:         Weight/BMI: Body mass index is 31.44 kg/m².  Pulse Oximetry: 98 %, O2 (LPM): 0, O2 Delivery: None (Room Air)     Constitutional: She is oriented to person, place, and time. She appears well-developed. No distress.   HENT:   Head: Normocephalic and atraumatic.   Eyes: Right eye exhibits no discharge. Left eye exhibits no discharge.   Neck: Neck supple.   Cardiovascular: Normal rate and regular rhythm.    No murmur heard.  Pulmonary/Chest: Effort normal and breath sounds normal. No stridor. No respiratory distress. She has no wheezes. She  has no rales. She exhibits no tenderness.   Abdominal: Soft. Bowel sounds are normal.   Musculoskeletal: Normal range of motion. She exhibits edema. She exhibits no tenderness.   Neurological: She is alert and oriented to person, place, and time. No cranial nerve deficit.   Skin: Skin is warm and dry. She is not diaphoretic. No erythema.   Psychiatric: She has a normal mood and affect. Judgment normal.     PROCEDURES:  NONE    RADIOLOGY:  US-LIVER AND BILIARY TREE   Final Result         1. Minimal gallbladder sludge is seen. No sonographic evidence of acute cholecystitis.         [id number] VIEW   Final Result         Moderate amount of stool throughout the colon.          DISCHARGE LABS:    No results for input(s): WBC, RBC, HEMOGLOBIN, HEMATOCRIT, MCV, MCH, RDW, PLATELETCT, MPV, NEUTSPOLYS, LYMPHOCYTES, MONOCYTES, EOSINOPHILS, BASOPHILS, RBCMORPHOLO in the last 72 hours.  Lab Results   Component Value Date    EWTBHHSZ36 638 09/10/2015    FERRITIN 17.4 09/10/2015    FERRITIN 59.2 11/14/2014    IRON 35 (L) 11/14/2014    TOTIRONBC 242 (L) 11/14/2014       Estimated GFR/CRCL = Estimated Creatinine Clearance: 148.5 mL/min (by C-G formula based on SCr of 0.61 mg/dL).  Recent Labs      04/02/18   1615   04/03/18   0415   04/04/18   0051  04/04/18   0452  04/05/18   0006   SODIUM  139   < >  141   < >  137  139  137   POTASSIUM  3.8   < >  3.8   < >  3.4*  3.2*  3.6   CHLORIDE  114*   < >  116*   < >  108  107  104   CO2  18*   < >  19*   < >  23  26  24   BUN  7*   < >  6*   < >  5*  5*  14   CREATININE  0.49*   < >  0.53   < >  0.43*  0.39*  0.61   CALCIUM  7.7*   < >  8.0*   < >  8.1*  8.1*  8.8   MAGNESIUM  1.7   --   2.0   --    --    --    --    PHOSPHORUS  3.3   --   1.8*   --    --    --    --     < > = values in this interval not displayed.       No results for input(s): ALTSGPT, ASTSGOT, ALKPHOSPHAT, TBILIRUBIN, DBILIRUBIN, GAMMAGT, LIPASE, ALBUMIN, GLOBULIN, PREALBUMIN, INR, MACROCYTOSIS in the last 72  hours.    @PNLABNT(GLUCOSE:3,POCGLUCOSE:3)  )  Lab Results   Component Value Date    HBA1C 13.3 (H) 04/01/2018    HBA1C 14.2 (H) 09/20/2017    HBA1C 14.5 (H) 07/27/2017    FREET4 0.96 09/10/2015    CORTISOL 45.1 (H) 04/14/2015       DISCHARGE MEDICATIONS:  (X)  Medication Reconciliation Completed     Medication List      CONTINUE taking these medications      Instructions   atorvastatin 20 MG Tabs  Commonly known as:  LIPITOR   Take 1 Tab by mouth every day.  Dose:  20 mg     BASAGLAR KWIKPEN 100 UNIT/ML Sopn injection  Generic drug:  insulin glargine   Inject 33 Units as instructed 2 times a day.  Dose:  33 Units     Cholecalciferol 2000 UNIT Caps   Take 1 Cap by mouth every day.  Dose:  1 Cap     ferrous sulfate 325 (65 Fe) MG tablet   Take 325 mg by mouth every day.  Dose:  325 mg     insulin glulisine 100 UNIT/ML Sopn injection  Commonly known as:  APIDRA   Inject 20 Units as instructed 3 times a day before meals. Pt states 20 + units depending on sliding scale. Pt can't tell me exact sliding scale  Dose:  20 Units     metoclopramide 5 MG/5ML Soln  Commonly known as:  REGLAN   Take 10 mg by mouth 3 times a day.  Dose:  10 mg              INSTRUCTIONS:   The patient was instructed to return to the ER in the event of worsening symptoms. I have counseled the patient on the importance of compliance and the patient has agreed to proceed with all medical recommendations and follow up plan indicated above.   The patient understands that all medications come with benefits and risks. Risks may include permanent injury or death and these risks can be minimized with close reassessment and monitoring.        Follow up appointment details :     F/U with PCP in 1-2 weeks    PENDING STUDIES:  NONE    Primary Care Provider: Navi Huber MD      Time spent on discharge day patient visit, preparing discharge paperwork and arranging for patient follow up 41 mins.

## 2018-04-05 NOTE — PROGRESS NOTES
Report called to CAIT Richards for pt to transfer to [place] [address] Bed 2.   Belongings collected, pt informed, no Questions/concerns at this time.

## 2018-04-06 ENCOUNTER — PATIENT OUTREACH (OUTPATIENT)
Dept: HEALTH INFORMATION MANAGEMENT | Facility: OTHER | Age: 28
End: 2018-04-06

## 2018-04-08 ENCOUNTER — APPOINTMENT (OUTPATIENT)
Dept: RADIOLOGY | Facility: MEDICAL CENTER | Age: 28
DRG: 638 | End: 2018-04-08
Attending: HOSPITALIST
Payer: MEDICAID

## 2018-04-08 ENCOUNTER — RESOLUTE PROFESSIONAL BILLING HOSPITAL PROF FEE (OUTPATIENT)
Dept: HOSPITALIST | Facility: MEDICAL CENTER | Age: 28
End: 2018-04-08
Payer: MEDICAID

## 2018-04-08 ENCOUNTER — HOSPITAL ENCOUNTER (INPATIENT)
Facility: MEDICAL CENTER | Age: 28
LOS: 3 days | DRG: 638 | End: 2018-04-11
Attending: EMERGENCY MEDICINE | Admitting: HOSPITALIST
Payer: MEDICAID

## 2018-04-08 DIAGNOSIS — E10.10 DIABETIC KETOACIDOSIS WITHOUT COMA ASSOCIATED WITH TYPE 1 DIABETES MELLITUS (HCC): ICD-10-CM

## 2018-04-08 PROBLEM — N39.0 UTI (URINARY TRACT INFECTION): Status: ACTIVE | Noted: 2018-04-08

## 2018-04-08 PROBLEM — E66.3 OVERWEIGHT: Status: ACTIVE | Noted: 2018-04-08

## 2018-04-08 PROBLEM — E11.10 DIABETIC KETOACIDOSIS (HCC): Status: ACTIVE | Noted: 2018-04-08

## 2018-04-08 PROBLEM — E87.5 HYPERKALEMIA: Status: ACTIVE | Noted: 2018-04-08

## 2018-04-08 LAB
ALBUMIN SERPL BCP-MCNC: 4.7 G/DL (ref 3.2–4.9)
ALBUMIN/GLOB SERPL: 1.3 G/DL
ALP SERPL-CCNC: 156 U/L (ref 30–99)
ALT SERPL-CCNC: 21 U/L (ref 2–50)
ANION GAP SERPL CALC-SCNC: 19 MMOL/L (ref 0–11.9)
ANION GAP SERPL CALC-SCNC: 24 MMOL/L (ref 0–11.9)
ANION GAP SERPL CALC-SCNC: 28 MMOL/L (ref 0–11.9)
ANION GAP SERPL CALC-SCNC: 29 MMOL/L (ref 0–11.9)
ANION GAP SERPL CALC-SCNC: 32 MMOL/L (ref 0–11.9)
ANION GAP SERPL CALC-SCNC: 8 MMOL/L (ref 0–11.9)
AST SERPL-CCNC: 22 U/L (ref 12–45)
B-OH-BUTYR SERPL-MCNC: >8 MMOL/L (ref 0.02–0.27)
BASOPHILS # BLD AUTO: 0.6 % (ref 0–1.8)
BASOPHILS # BLD: 0.09 K/UL (ref 0–0.12)
BILIRUB SERPL-MCNC: 0.6 MG/DL (ref 0.1–1.5)
BUN SERPL-MCNC: 19 MG/DL (ref 8–22)
BUN SERPL-MCNC: 21 MG/DL (ref 8–22)
BUN SERPL-MCNC: 22 MG/DL (ref 8–22)
BUN SERPL-MCNC: 23 MG/DL (ref 8–22)
BUN SERPL-MCNC: 24 MG/DL (ref 8–22)
BUN SERPL-MCNC: 25 MG/DL (ref 8–22)
CALCIUM SERPL-MCNC: 7.8 MG/DL (ref 8.5–10.5)
CALCIUM SERPL-MCNC: 7.9 MG/DL (ref 8.5–10.5)
CALCIUM SERPL-MCNC: 8.2 MG/DL (ref 8.5–10.5)
CALCIUM SERPL-MCNC: 8.4 MG/DL (ref 8.5–10.5)
CALCIUM SERPL-MCNC: 8.5 MG/DL (ref 8.5–10.5)
CALCIUM SERPL-MCNC: 9.9 MG/DL (ref 8.5–10.5)
CHLORIDE SERPL-SCNC: 100 MMOL/L (ref 96–112)
CHLORIDE SERPL-SCNC: 101 MMOL/L (ref 96–112)
CHLORIDE SERPL-SCNC: 110 MMOL/L (ref 96–112)
CHLORIDE SERPL-SCNC: 115 MMOL/L (ref 96–112)
CHLORIDE SERPL-SCNC: 120 MMOL/L (ref 96–112)
CHLORIDE SERPL-SCNC: 91 MMOL/L (ref 96–112)
CO2 SERPL-SCNC: 14 MMOL/L (ref 20–33)
CO2 SERPL-SCNC: 6 MMOL/L (ref 20–33)
CO2 SERPL-SCNC: 7 MMOL/L (ref 20–33)
CO2 SERPL-SCNC: <5 MMOL/L (ref 20–33)
CREAT SERPL-MCNC: 0.63 MG/DL (ref 0.5–1.4)
CREAT SERPL-MCNC: 0.73 MG/DL (ref 0.5–1.4)
CREAT SERPL-MCNC: 0.93 MG/DL (ref 0.5–1.4)
CREAT SERPL-MCNC: 0.95 MG/DL (ref 0.5–1.4)
CREAT SERPL-MCNC: 0.98 MG/DL (ref 0.5–1.4)
CREAT SERPL-MCNC: 1.06 MG/DL (ref 0.5–1.4)
EKG IMPRESSION: NORMAL
EOSINOPHIL # BLD AUTO: 0.14 K/UL (ref 0–0.51)
EOSINOPHIL NFR BLD: 0.9 % (ref 0–6.9)
ERYTHROCYTE [DISTWIDTH] IN BLOOD BY AUTOMATED COUNT: 43 FL (ref 35.9–50)
EST. AVERAGE GLUCOSE BLD GHB EST-MCNC: 312 MG/DL
GLOBULIN SER CALC-MCNC: 3.5 G/DL (ref 1.9–3.5)
GLUCOSE BLD-MCNC: 102 MG/DL (ref 65–99)
GLUCOSE BLD-MCNC: 115 MG/DL (ref 65–99)
GLUCOSE BLD-MCNC: 120 MG/DL (ref 65–99)
GLUCOSE BLD-MCNC: 127 MG/DL (ref 65–99)
GLUCOSE BLD-MCNC: 171 MG/DL (ref 65–99)
GLUCOSE BLD-MCNC: 258 MG/DL (ref 65–99)
GLUCOSE BLD-MCNC: 318 MG/DL (ref 65–99)
GLUCOSE BLD-MCNC: 32 MG/DL (ref 65–99)
GLUCOSE BLD-MCNC: 39 MG/DL (ref 65–99)
GLUCOSE BLD-MCNC: 429 MG/DL (ref 65–99)
GLUCOSE BLD-MCNC: 46 MG/DL (ref 65–99)
GLUCOSE BLD-MCNC: 465 MG/DL (ref 65–99)
GLUCOSE BLD-MCNC: 74 MG/DL (ref 65–99)
GLUCOSE BLD-MCNC: 78 MG/DL (ref 65–99)
GLUCOSE BLD-MCNC: 81 MG/DL (ref 65–99)
GLUCOSE BLD-MCNC: 85 MG/DL (ref 65–99)
GLUCOSE BLD-MCNC: 98 MG/DL (ref 65–99)
GLUCOSE BLD-MCNC: >600 MG/DL (ref 65–99)
GLUCOSE SERPL-MCNC: 126 MG/DL (ref 65–99)
GLUCOSE SERPL-MCNC: 292 MG/DL (ref 65–99)
GLUCOSE SERPL-MCNC: 515 MG/DL (ref 65–99)
GLUCOSE SERPL-MCNC: 634 MG/DL (ref 65–99)
GLUCOSE SERPL-MCNC: 697 MG/DL (ref 65–99)
GLUCOSE SERPL-MCNC: 723 MG/DL (ref 65–99)
GLUCOSE SERPL-MCNC: 725 MG/DL (ref 65–99)
HBA1C MFR BLD: 12.5 % (ref 0–5.6)
HCT VFR BLD AUTO: 50.1 % (ref 37–47)
HGB BLD-MCNC: 16.2 G/DL (ref 12–16)
IMM GRANULOCYTES # BLD AUTO: 0.19 K/UL (ref 0–0.11)
IMM GRANULOCYTES NFR BLD AUTO: 1.3 % (ref 0–0.9)
LIPASE SERPL-CCNC: 18 U/L (ref 11–82)
LYMPHOCYTES # BLD AUTO: 3.36 K/UL (ref 1–4.8)
LYMPHOCYTES NFR BLD: 22.1 % (ref 22–41)
MAGNESIUM SERPL-MCNC: 2.3 MG/DL (ref 1.5–2.5)
MCH RBC QN AUTO: 28.7 PG (ref 27–33)
MCHC RBC AUTO-ENTMCNC: 32.1 G/DL (ref 33.6–35)
MCV RBC AUTO: 89.4 FL (ref 81.4–97.8)
MONOCYTES # BLD AUTO: 0.91 K/UL (ref 0–0.85)
MONOCYTES NFR BLD AUTO: 6 % (ref 0–13.4)
NEUTROPHILS # BLD AUTO: 10.5 K/UL (ref 2–7.15)
NEUTROPHILS NFR BLD: 69.1 % (ref 44–72)
NRBC # BLD AUTO: 0 K/UL
NRBC BLD-RTO: 0 /100 WBC
PHOSPHATE SERPL-MCNC: 3.3 MG/DL (ref 2.5–4.5)
PHOSPHATE SERPL-MCNC: 5.3 MG/DL (ref 2.5–4.5)
PHOSPHATE SERPL-MCNC: 5.7 MG/DL (ref 2.5–4.5)
PLATELET # BLD AUTO: 346 K/UL (ref 164–446)
PMV BLD AUTO: 10.6 FL (ref 9–12.9)
POTASSIUM SERPL-SCNC: 3.3 MMOL/L (ref 3.6–5.5)
POTASSIUM SERPL-SCNC: 3.4 MMOL/L (ref 3.6–5.5)
POTASSIUM SERPL-SCNC: 4.8 MMOL/L (ref 3.6–5.5)
POTASSIUM SERPL-SCNC: 5.1 MMOL/L (ref 3.6–5.5)
POTASSIUM SERPL-SCNC: 5.9 MMOL/L (ref 3.6–5.5)
POTASSIUM SERPL-SCNC: 6.3 MMOL/L (ref 3.6–5.5)
POTASSIUM SERPL-SCNC: 6.6 MMOL/L (ref 3.6–5.5)
PROT SERPL-MCNC: 8.2 G/DL (ref 6–8.2)
RBC # BLD AUTO: 5.58 M/UL (ref 4.2–5.4)
SODIUM SERPL-SCNC: 130 MMOL/L (ref 135–145)
SODIUM SERPL-SCNC: 134 MMOL/L (ref 135–145)
SODIUM SERPL-SCNC: 134 MMOL/L (ref 135–145)
SODIUM SERPL-SCNC: 139 MMOL/L (ref 135–145)
SODIUM SERPL-SCNC: 140 MMOL/L (ref 135–145)
SODIUM SERPL-SCNC: 142 MMOL/L (ref 135–145)
WBC # BLD AUTO: 15.2 K/UL (ref 4.8–10.8)

## 2018-04-08 PROCEDURE — 93005 ELECTROCARDIOGRAM TRACING: CPT

## 2018-04-08 PROCEDURE — 99291 CRITICAL CARE FIRST HOUR: CPT

## 2018-04-08 PROCEDURE — 700101 HCHG RX REV CODE 250: Performed by: HOSPITALIST

## 2018-04-08 PROCEDURE — A9270 NON-COVERED ITEM OR SERVICE: HCPCS | Performed by: HOSPITALIST

## 2018-04-08 PROCEDURE — 83690 ASSAY OF LIPASE: CPT

## 2018-04-08 PROCEDURE — 84132 ASSAY OF SERUM POTASSIUM: CPT

## 2018-04-08 PROCEDURE — 85025 COMPLETE CBC W/AUTO DIFF WBC: CPT

## 2018-04-08 PROCEDURE — 83735 ASSAY OF MAGNESIUM: CPT | Mod: 91

## 2018-04-08 PROCEDURE — 96375 TX/PRO/DX INJ NEW DRUG ADDON: CPT

## 2018-04-08 PROCEDURE — 770022 HCHG ROOM/CARE - ICU (200)

## 2018-04-08 PROCEDURE — 700105 HCHG RX REV CODE 258: Performed by: EMERGENCY MEDICINE

## 2018-04-08 PROCEDURE — 82962 GLUCOSE BLOOD TEST: CPT | Mod: 91

## 2018-04-08 PROCEDURE — 700111 HCHG RX REV CODE 636 W/ 250 OVERRIDE (IP): Performed by: HOSPITALIST

## 2018-04-08 PROCEDURE — 80048 BASIC METABOLIC PNL TOTAL CA: CPT | Mod: 91

## 2018-04-08 PROCEDURE — 99291 CRITICAL CARE FIRST HOUR: CPT | Mod: 25 | Performed by: HOSPITALIST

## 2018-04-08 PROCEDURE — 80053 COMPREHEN METABOLIC PANEL: CPT

## 2018-04-08 PROCEDURE — 96361 HYDRATE IV INFUSION ADD-ON: CPT

## 2018-04-08 PROCEDURE — 83036 HEMOGLOBIN GLYCOSYLATED A1C: CPT

## 2018-04-08 PROCEDURE — 700111 HCHG RX REV CODE 636 W/ 250 OVERRIDE (IP): Performed by: EMERGENCY MEDICINE

## 2018-04-08 PROCEDURE — 93005 ELECTROCARDIOGRAM TRACING: CPT | Performed by: EMERGENCY MEDICINE

## 2018-04-08 PROCEDURE — 02HV33Z INSERTION OF INFUSION DEVICE INTO SUPERIOR VENA CAVA, PERCUTANEOUS APPROACH: ICD-10-PCS | Performed by: HOSPITALIST

## 2018-04-08 PROCEDURE — 700102 HCHG RX REV CODE 250 W/ 637 OVERRIDE(OP): Performed by: EMERGENCY MEDICINE

## 2018-04-08 PROCEDURE — 71045 X-RAY EXAM CHEST 1 VIEW: CPT

## 2018-04-08 PROCEDURE — 82947 ASSAY GLUCOSE BLOOD QUANT: CPT

## 2018-04-08 PROCEDURE — 84100 ASSAY OF PHOSPHORUS: CPT | Mod: 91

## 2018-04-08 PROCEDURE — 82010 KETONE BODYS QUAN: CPT

## 2018-04-08 PROCEDURE — 700105 HCHG RX REV CODE 258: Performed by: HOSPITALIST

## 2018-04-08 PROCEDURE — 700102 HCHG RX REV CODE 250 W/ 637 OVERRIDE(OP): Performed by: HOSPITALIST

## 2018-04-08 PROCEDURE — B543ZZA ULTRASONOGRAPHY OF RIGHT JUGULAR VEINS, GUIDANCE: ICD-10-PCS | Performed by: HOSPITALIST

## 2018-04-08 PROCEDURE — 700102 HCHG RX REV CODE 250 W/ 637 OVERRIDE(OP): Performed by: INTERNAL MEDICINE

## 2018-04-08 PROCEDURE — 36556 INSERT NON-TUNNEL CV CATH: CPT | Performed by: HOSPITALIST

## 2018-04-08 PROCEDURE — 700105 HCHG RX REV CODE 258: Performed by: INTERNAL MEDICINE

## 2018-04-08 PROCEDURE — 96365 THER/PROPH/DIAG IV INF INIT: CPT

## 2018-04-08 PROCEDURE — 99223 1ST HOSP IP/OBS HIGH 75: CPT | Mod: 25 | Performed by: HOSPITALIST

## 2018-04-08 RX ORDER — MAGNESIUM SULFATE HEPTAHYDRATE 40 MG/ML
2 INJECTION, SOLUTION INTRAVENOUS
Status: DISCONTINUED | OUTPATIENT
Start: 2018-04-08 | End: 2018-04-09

## 2018-04-08 RX ORDER — ONDANSETRON 4 MG/1
4 TABLET, ORALLY DISINTEGRATING ORAL EVERY 4 HOURS PRN
Status: DISCONTINUED | OUTPATIENT
Start: 2018-04-08 | End: 2018-04-11 | Stop reason: HOSPADM

## 2018-04-08 RX ORDER — ATORVASTATIN CALCIUM 20 MG/1
20 TABLET, FILM COATED ORAL DAILY
Status: DISCONTINUED | OUTPATIENT
Start: 2018-04-08 | End: 2018-04-11 | Stop reason: HOSPADM

## 2018-04-08 RX ORDER — FERROUS SULFATE 325(65) MG
325 TABLET ORAL DAILY
Status: DISCONTINUED | OUTPATIENT
Start: 2018-04-08 | End: 2018-04-11 | Stop reason: HOSPADM

## 2018-04-08 RX ORDER — OXYCODONE HYDROCHLORIDE 5 MG/1
5 TABLET ORAL
Status: DISCONTINUED | OUTPATIENT
Start: 2018-04-08 | End: 2018-04-11 | Stop reason: HOSPADM

## 2018-04-08 RX ORDER — SODIUM CHLORIDE 9 MG/ML
2000 INJECTION, SOLUTION INTRAVENOUS ONCE
Status: DISCONTINUED | OUTPATIENT
Start: 2018-04-08 | End: 2018-04-08

## 2018-04-08 RX ORDER — MAGNESIUM SULFATE HEPTAHYDRATE 40 MG/ML
4 INJECTION, SOLUTION INTRAVENOUS
Status: DISCONTINUED | OUTPATIENT
Start: 2018-04-08 | End: 2018-04-09

## 2018-04-08 RX ORDER — ACETAMINOPHEN 325 MG/1
650 TABLET ORAL EVERY 6 HOURS PRN
Status: DISCONTINUED | OUTPATIENT
Start: 2018-04-08 | End: 2018-04-11 | Stop reason: HOSPADM

## 2018-04-08 RX ORDER — CIPROFLOXACIN 2 MG/ML
400 INJECTION, SOLUTION INTRAVENOUS EVERY 12 HOURS
Status: DISCONTINUED | OUTPATIENT
Start: 2018-04-08 | End: 2018-04-08

## 2018-04-08 RX ORDER — MIDAZOLAM HYDROCHLORIDE 1 MG/ML
INJECTION INTRAMUSCULAR; INTRAVENOUS
Status: COMPLETED
Start: 2018-04-08 | End: 2018-04-08

## 2018-04-08 RX ORDER — HALOPERIDOL 5 MG/ML
5 INJECTION INTRAMUSCULAR ONCE
Status: COMPLETED | OUTPATIENT
Start: 2018-04-08 | End: 2018-04-08

## 2018-04-08 RX ORDER — SODIUM CHLORIDE 9 MG/ML
1000 INJECTION, SOLUTION INTRAVENOUS ONCE
Status: COMPLETED | OUTPATIENT
Start: 2018-04-08 | End: 2018-04-08

## 2018-04-08 RX ORDER — PROMETHAZINE HYDROCHLORIDE 25 MG/1
12.5-25 TABLET ORAL EVERY 4 HOURS PRN
Status: DISCONTINUED | OUTPATIENT
Start: 2018-04-08 | End: 2018-04-08

## 2018-04-08 RX ORDER — MIDAZOLAM HYDROCHLORIDE 1 MG/ML
2 INJECTION INTRAMUSCULAR; INTRAVENOUS ONCE
Status: ACTIVE | OUTPATIENT
Start: 2018-04-08 | End: 2018-04-09

## 2018-04-08 RX ORDER — CLONIDINE HYDROCHLORIDE 0.1 MG/1
0.1 TABLET ORAL EVERY 6 HOURS PRN
Status: DISCONTINUED | OUTPATIENT
Start: 2018-04-08 | End: 2018-04-11 | Stop reason: HOSPADM

## 2018-04-08 RX ORDER — DEXTROSE AND SODIUM CHLORIDE 5; .9 G/100ML; G/100ML
INJECTION, SOLUTION INTRAVENOUS CONTINUOUS
Status: DISCONTINUED | OUTPATIENT
Start: 2018-04-08 | End: 2018-04-08

## 2018-04-08 RX ORDER — ONDANSETRON 2 MG/ML
4 INJECTION INTRAMUSCULAR; INTRAVENOUS ONCE
Status: COMPLETED | OUTPATIENT
Start: 2018-04-08 | End: 2018-04-08

## 2018-04-08 RX ORDER — ONDANSETRON 2 MG/ML
4 INJECTION INTRAMUSCULAR; INTRAVENOUS EVERY 4 HOURS PRN
Status: DISCONTINUED | OUTPATIENT
Start: 2018-04-08 | End: 2018-04-11 | Stop reason: HOSPADM

## 2018-04-08 RX ORDER — DEXTROSE MONOHYDRATE 25 G/50ML
25 INJECTION, SOLUTION INTRAVENOUS
Status: DISCONTINUED | OUTPATIENT
Start: 2018-04-08 | End: 2018-04-11 | Stop reason: HOSPADM

## 2018-04-08 RX ORDER — MORPHINE SULFATE 4 MG/ML
2 INJECTION, SOLUTION INTRAMUSCULAR; INTRAVENOUS
Status: DISCONTINUED | OUTPATIENT
Start: 2018-04-08 | End: 2018-04-11 | Stop reason: HOSPADM

## 2018-04-08 RX ORDER — AMOXICILLIN 250 MG
2 CAPSULE ORAL 2 TIMES DAILY
Status: DISCONTINUED | OUTPATIENT
Start: 2018-04-08 | End: 2018-04-11 | Stop reason: HOSPADM

## 2018-04-08 RX ORDER — DIPHENHYDRAMINE HYDROCHLORIDE 50 MG/ML
25 INJECTION INTRAMUSCULAR; INTRAVENOUS ONCE
Status: COMPLETED | OUTPATIENT
Start: 2018-04-08 | End: 2018-04-08

## 2018-04-08 RX ORDER — SODIUM CHLORIDE 9 MG/ML
INJECTION, SOLUTION INTRAVENOUS CONTINUOUS
Status: DISCONTINUED | OUTPATIENT
Start: 2018-04-08 | End: 2018-04-10

## 2018-04-08 RX ORDER — OXYCODONE HYDROCHLORIDE 5 MG/1
2.5 TABLET ORAL
Status: DISCONTINUED | OUTPATIENT
Start: 2018-04-08 | End: 2018-04-11 | Stop reason: HOSPADM

## 2018-04-08 RX ORDER — DEXTROSE AND SODIUM CHLORIDE 5; .45 G/100ML; G/100ML
INJECTION, SOLUTION INTRAVENOUS CONTINUOUS
Status: DISCONTINUED | OUTPATIENT
Start: 2018-04-08 | End: 2018-04-08

## 2018-04-08 RX ORDER — PROMETHAZINE HYDROCHLORIDE 25 MG/1
12.5-25 SUPPOSITORY RECTAL EVERY 4 HOURS PRN
Status: DISCONTINUED | OUTPATIENT
Start: 2018-04-08 | End: 2018-04-08

## 2018-04-08 RX ORDER — DEXTROSE AND SODIUM CHLORIDE 10; .45 G/100ML; G/100ML
INJECTION, SOLUTION INTRAVENOUS CONTINUOUS
Status: DISCONTINUED | OUTPATIENT
Start: 2018-04-08 | End: 2018-04-08

## 2018-04-08 RX ORDER — BISACODYL 10 MG
10 SUPPOSITORY, RECTAL RECTAL
Status: DISCONTINUED | OUTPATIENT
Start: 2018-04-08 | End: 2018-04-11 | Stop reason: HOSPADM

## 2018-04-08 RX ORDER — LORAZEPAM 2 MG/ML
INJECTION INTRAMUSCULAR
Status: DISCONTINUED
Start: 2018-04-08 | End: 2018-04-08

## 2018-04-08 RX ORDER — POLYETHYLENE GLYCOL 3350 17 G/17G
1 POWDER, FOR SOLUTION ORAL
Status: DISCONTINUED | OUTPATIENT
Start: 2018-04-08 | End: 2018-04-11 | Stop reason: HOSPADM

## 2018-04-08 RX ADMIN — SODIUM CHLORIDE 10 UNITS/HR: 9 INJECTION, SOLUTION INTRAVENOUS at 09:45

## 2018-04-08 RX ADMIN — ONDANSETRON HYDROCHLORIDE 4 MG: 2 INJECTION INTRAMUSCULAR; INTRAVENOUS at 19:53

## 2018-04-08 RX ADMIN — FENTANYL CITRATE 50 MCG: 50 INJECTION, SOLUTION INTRAMUSCULAR; INTRAVENOUS at 17:20

## 2018-04-08 RX ADMIN — SODIUM CHLORIDE 1000 ML: 9 INJECTION, SOLUTION INTRAVENOUS at 09:15

## 2018-04-08 RX ADMIN — METRONIDAZOLE 500 MG: 500 INJECTION, SOLUTION INTRAVENOUS at 08:46

## 2018-04-08 RX ADMIN — PROCHLORPERAZINE EDISYLATE 10 MG: 5 INJECTION INTRAMUSCULAR; INTRAVENOUS at 21:55

## 2018-04-08 RX ADMIN — POTASSIUM CHLORIDE 40 MEQ: 2 INJECTION, SOLUTION, CONCENTRATE INTRAVENOUS at 19:40

## 2018-04-08 RX ADMIN — MORPHINE SULFATE 2 MG: 4 INJECTION INTRAVENOUS at 21:58

## 2018-04-08 RX ADMIN — ENOXAPARIN SODIUM 40 MG: 100 INJECTION SUBCUTANEOUS at 08:47

## 2018-04-08 RX ADMIN — SODIUM CHLORIDE 8 UNITS/HR: 9 INJECTION, SOLUTION INTRAVENOUS at 03:24

## 2018-04-08 RX ADMIN — DEXTROSE AND SODIUM CHLORIDE: 10; .45 INJECTION, SOLUTION INTRAVENOUS at 14:28

## 2018-04-08 RX ADMIN — DEXTROSE AND SODIUM CHLORIDE: 5; 450 INJECTION, SOLUTION INTRAVENOUS at 13:50

## 2018-04-08 RX ADMIN — SODIUM CHLORIDE 8 UNITS/HR: 9 INJECTION, SOLUTION INTRAVENOUS at 17:34

## 2018-04-08 RX ADMIN — ONDANSETRON 4 MG: 2 INJECTION, SOLUTION INTRAMUSCULAR; INTRAVENOUS at 01:59

## 2018-04-08 RX ADMIN — SODIUM CHLORIDE: 9 INJECTION, SOLUTION INTRAVENOUS at 07:37

## 2018-04-08 RX ADMIN — OXYCODONE HYDROCHLORIDE 5 MG: 5 TABLET ORAL at 21:51

## 2018-04-08 RX ADMIN — POTASSIUM CHLORIDE 10 MEQ: 2 INJECTION, SOLUTION, CONCENTRATE INTRAVENOUS at 15:32

## 2018-04-08 RX ADMIN — SODIUM CHLORIDE 1000 ML: 9 INJECTION, SOLUTION INTRAVENOUS at 06:52

## 2018-04-08 RX ADMIN — DIPHENHYDRAMINE HYDROCHLORIDE 25 MG: 50 INJECTION, SOLUTION INTRAMUSCULAR; INTRAVENOUS at 02:07

## 2018-04-08 RX ADMIN — CEFTRIAXONE SODIUM 1 G: 10 INJECTION, POWDER, FOR SOLUTION INTRAVENOUS at 20:20

## 2018-04-08 RX ADMIN — SODIUM CHLORIDE 1000 ML: 9 INJECTION, SOLUTION INTRAVENOUS at 01:58

## 2018-04-08 RX ADMIN — CIPROFLOXACIN 400 MG: 2 INJECTION, SOLUTION INTRAVENOUS at 10:26

## 2018-04-08 RX ADMIN — SODIUM CHLORIDE 1000 ML: 9 INJECTION, SOLUTION INTRAVENOUS at 02:30

## 2018-04-08 RX ADMIN — SODIUM CHLORIDE: 9 INJECTION, SOLUTION INTRAVENOUS at 04:38

## 2018-04-08 RX ADMIN — HALOPERIDOL LACTATE 5 MG: 5 INJECTION INTRAMUSCULAR at 02:07

## 2018-04-08 RX ADMIN — SODIUM CHLORIDE: 9 INJECTION, SOLUTION INTRAVENOUS at 20:21

## 2018-04-08 RX ADMIN — SODIUM CHLORIDE 8 UNITS/HR: 9 INJECTION, SOLUTION INTRAVENOUS at 04:39

## 2018-04-08 RX ADMIN — MORPHINE SULFATE 2 MG: 4 INJECTION INTRAVENOUS at 05:03

## 2018-04-08 RX ADMIN — MORPHINE SULFATE 2 MG: 4 INJECTION INTRAVENOUS at 08:48

## 2018-04-08 ASSESSMENT — PAIN SCALES - GENERAL
PAINLEVEL_OUTOF10: 6
PAINLEVEL_OUTOF10: 8
PAINLEVEL_OUTOF10: 6
PAINLEVEL_OUTOF10: 8
PAINLEVEL_OUTOF10: 5
PAINLEVEL_OUTOF10: 6
PAINLEVEL_OUTOF10: 6
PAINLEVEL_OUTOF10: 8
PAINLEVEL_OUTOF10: 6
PAINLEVEL_OUTOF10: 4
PAINLEVEL_OUTOF10: 3
PAINLEVEL_OUTOF10: 5
PAINLEVEL_OUTOF10: 6
PAINLEVEL_OUTOF10: 6
PAINLEVEL_OUTOF10: 8

## 2018-04-08 ASSESSMENT — ENCOUNTER SYMPTOMS
CHILLS: 0
NEUROLOGICAL NEGATIVE: 1
NAUSEA: 1
CHILLS: 1
FEVER: 1
EYES NEGATIVE: 1
PSYCHIATRIC NEGATIVE: 1
PND: 0
VOMITING: 1
SHORTNESS OF BREATH: 1
HALLUCINATIONS: 0
FLANK PAIN: 1
ABDOMINAL PAIN: 1
FEVER: 0
NERVOUS/ANXIOUS: 0
MUSCULOSKELETAL NEGATIVE: 1

## 2018-04-08 ASSESSMENT — COPD QUESTIONNAIRES
COPD SCREENING SCORE: 0
HAVE YOU SMOKED AT LEAST 100 CIGARETTES IN YOUR ENTIRE LIFE: NO/DON'T KNOW
DO YOU EVER COUGH UP ANY MUCUS OR PHLEGM?: NO/ONLY WITH OCCASIONAL COLDS OR INFECTIONS
DURING THE PAST 4 WEEKS HOW MUCH DID YOU FEEL SHORT OF BREATH: NONE/LITTLE OF THE TIME

## 2018-04-08 ASSESSMENT — LIFESTYLE VARIABLES
DO YOU DRINK ALCOHOL: NO
EVER_SMOKED: NEVER

## 2018-04-08 NOTE — PROGRESS NOTES
Charge nurse Sulma able to place line that draws blood, getting stat labs sent down now, sugars check is an hour late due to inability to draw blood, Sulma spent 30 minutes total at bedside working to help us obtain a line

## 2018-04-08 NOTE — ED PROVIDER NOTES
"ED Provider Note    CHIEF COMPLAINT  Chief Complaint   Patient presents with   • High Blood Sugar     FSBG 445 at home; Reading \"hi\" in triage; pt vomiting in triage   • Abdominal Pain     R sided radiating to mid stomach   • Chest Pain     \"chest feels tight\"       HPI  Alis Sparks is a 27 y.o. female who presents to the emergency department with abdominal discomfort. The patient was discharged from the hospital on the fifth after she is admitted for diabetic ketoacidosis and a urinary tract infection. The patient states she's been compliant. She presents with right-sided abdominal pain that radiates up in the epigastric region. She has associated vomiting. She's also had some chest tightness. The patient does have a history of noncompliance but her mom states she's been taking her medication as prescribed. She also has chronic back pain and gastroparesis.    REVIEW OF SYSTEMS  See HPI for further details. All other systems are negative.     PAST MEDICAL HISTORY  Past Medical History:   Diagnosis Date   • Backpain    • Bronchitis    • Diabetes type 1, controlled (CMS-[facility])     tests 4-5 times daily   • DKA (diabetic ketoacidoses) (CMS-[facility])    • Fall    • Gastroparesis    • Kidney infection    • Pneumonia    • UTI (urinary tract infection)        SOCIAL HISTORY  Social History     Social History   • Marital status: Single     Spouse name: N/A   • Number of children: N/A   • Years of education: N/A     Social History Main Topics   • Smoking status: Never Smoker   • Smokeless tobacco: Never Used   • Alcohol use No   • Drug use: No   • Sexual activity: No     Other Topics Concern   • Not on file     Social History Narrative   • No narrative on file           PHYSICAL EXAM  VITAL SIGNS: /73   Pulse (!) 154   Temp 36.6 °C (97.9 °F) (Temporal)   Resp 18   Ht 1.651 m (5' 5\")   Wt 81.5 kg (179 lb 10.8 oz)   LMP 03/10/2018   SpO2 97%   BMI 29.90 kg/m²   Constitutional: in acute distress, Non-toxic " appearance.   HENT: Normocephalic, Atraumatic, tympanic membranes are intact and nonerythematous bilaterally, Oropharynx moist without exudates or erythema, Nose normal.   Eyes: PERRLA, EOMI, Conjunctiva normal.  Neck: Supple without meningismus  Lymphatic: No lymphadenopathy noted.   Cardiovascular: Tachycardic heart rate, Normal rhythm, No murmurs, No rubs, No gallops.   Thorax & Lungs: Normal breath sounds, No respiratory distress, No wheezing, No chest tenderness.   Abdomen: Epigastric discomfort to deep palpation, no rebound, no guarding, normal bowel sounds  Skin: Warm, Dry, No erythema, No rash.   Back: No tenderness, No CVA tenderness.   Extremities: Atraumatic with symmetric distal pulses, No edema, No tenderness, No cyanosis, No clubbing.   Neurologic: Alert & oriented x 3, cranial nerves II through XII are intact, Normal motor function, Normal sensory function, No focal deficits noted.   Psychiatric: Affect normal, Judgment normal, Mood normal.       COURSE & MEDICAL DECISION MAKING  Pertinent Labs & Imaging studies reviewed. (See chart for details)  This a 27-year-old female who presents to the emergency department in acute distress. Her bladder does support diabetic ketoacidosis. The patient was treated aggressively with intravenous fluids for resuscitation. After reviewing the blood work the patient was started on insulin drip and a second large bore peripheral intravenous line will be placed by the nursing staff. The patient be admitted to the ICU in guarded condition. As for her abdominal pain she does have a history of gastroparesis and I suspect her pain is from her current diabetic ketoacidosis as well as her gastroparesis. She received Haldol and Benadryl and since that time she didn't resting cough with no further emesis.    FINAL IMPRESSION  1. Diabetic ketoacidosis  2. Critical care time 30 minutes       Disposition  The patient be admitted to the ICU in guarded condition    Electronically  signed by: Oneal Jacobs, 4/8/2018 1:40 AM

## 2018-04-08 NOTE — H&P
" Hospital Medicine History and Physical    Date of Service  4/8/2018    Chief Complaint  Chief Complaint   Patient presents with   • High Blood Sugar     FSBG 445 at home; Reading \"hi\" in triage; pt vomiting in triage   • Abdominal Pain     R sided radiating to mid stomach   • Chest Pain     \"chest feels tight\"       History of Presenting Illness  27 y.o. female with history of diabetes mellitus 1 on insulin, poor compliance and multiple prior episodes of diabetic ketoacidosis reports that she was well until midnight of the night prior to admission.  She had the onset of left sided abdominal and side pain, associated with fever, chills, nausea and intractable vomiting.  She reports no exacerbating or alleviating factors and reports compliance with her basal and prandial insulin regimen in the preceding days.  She also has associated chest tightness and shortness of breath which is constant.      Primary Care Physician  Navi Huber M.D.    Consultants  none    Code Status  Full code     Review of Systems  Review of Systems   Constitutional: Positive for chills, fever and malaise/fatigue.   HENT: Negative.    Eyes: Negative.    Respiratory: Positive for shortness of breath.    Cardiovascular: Positive for chest pain.   Gastrointestinal: Positive for abdominal pain, nausea and vomiting.   Genitourinary: Negative.    Musculoskeletal: Negative.    Skin: Negative.    Neurological: Negative.    Endo/Heme/Allergies: Negative.    Psychiatric/Behavioral: Negative.         Past Medical History  Past Medical History:   Diagnosis Date   • Backpain    • Bronchitis    • Diabetes type 1, controlled (CMS-[facility])     tests 4-5 times daily   • DKA (diabetic ketoacidoses) (CMS-[facility])    • Fall    • Gastroparesis    • Kidney infection    • Pneumonia    • UTI (urinary tract infection)        Surgical History  Past Surgical History:   Procedure Laterality Date   • SUBMANDIBLE ABSCESS INCISION AND DRAINAGE Left 11/8/2017    " Procedure: SUBMANDIBLE ABSCESS INCISION AND DRAINAGE;  Surgeon: Osman Young M.D.;  Location: SURGERY [facility];  Service: Dental   • DENTAL EXTRACTION(S)  11/8/2017    Procedure: DENTAL EXTRACTION(S);  Surgeon: Osman Young M.D.;  Location: SURGERY [facility];  Service: Dental   • GASTROSCOPY-ENDO  9/18/2014    Performed by Sylvain PERRY M.D. at ENDOSCOPY [facility]   • GASTROSCOPY WITH BIOPSY  9/18/2014    Performed by Sylvain PERRY M.D. at ENDOSCOPY [facility]   • OTHER      surgery to patch lung after pneumor from central line placement       Medications  No current facility-administered medications on file prior to encounter.      Current Outpatient Prescriptions on File Prior to Encounter   Medication Sig Dispense Refill   • insulin glargine (BASAGLAR KWIKPEN) 100 UNIT/ML Solution Pen-injector injection Inject 33 Units as instructed 2 times a day.     • insulin glulisine (APIDRA) 100 UNIT/ML Solution Pen-injector injection Inject 20 Units as instructed 3 times a day before meals. Pt states 20 + units depending on sliding scale. Pt can't tell me exact sliding scale     • ferrous sulfate 325 (65 Fe) MG tablet Take 325 mg by mouth every day.     • Cholecalciferol 2000 UNIT Cap Take 1 Cap by mouth every day.     • metoclopramide (REGLAN) 5 MG/5ML Solution Take 10 mg by mouth 3 times a day.     • atorvastatin (LIPITOR) 20 MG Tab Take 1 Tab by mouth every day. 30 Tab 0       Family History  Family History   Problem Relation Age of Onset   • Cancer       breasts, multiple family members   • Hypertension Maternal Grandfather        Social History  Social History   Substance Use Topics   • Smoking status: Never Smoker   • Smokeless tobacco: Never Used   • Alcohol use No       Allergies  Allergies   Allergen Reactions   • Pcn [Penicillins] Shortness of Breath and Swelling     Per patient's Mom, patient tolerates Keflex   • Lisinopril Unspecified     Per historical: Reported pedal swelling. No  facial/angioedema or rash nor respiratory symptoms.    • Promethazine Vomiting        Physical Exam  Laboratory   Hemodynamics  Temp (24hrs), Av.6 °C (97.9 °F), Min:36.6 °C (97.9 °F), Max:36.6 °C (97.9 °F)   Temperature: 36.6 °C (97.9 °F)  Pulse  Av.3  Min: 144  Max: 155 Heart Rate (Monitored): (!) 152  Blood Pressure: 107/73, NIBP: 105/57      Respiratory      Respiration: (!) 27, Pulse Oximetry: 99 %             Physical Exam   Constitutional: She is oriented to person, place, and time. She appears well-developed and well-nourished. She appears distressed.   HENT:   Head: Normocephalic and atraumatic.   Eyes: Conjunctivae are normal. Pupils are equal, round, and reactive to light.   Neck: Normal range of motion. Neck supple. No tracheal deviation present. No thyromegaly present.   Cardiovascular: Normal rate, regular rhythm and normal heart sounds.  Exam reveals no gallop and no friction rub.    No murmur heard.  Pulmonary/Chest: Effort normal and breath sounds normal. No respiratory distress. She has no wheezes. She has no rales.   Abdominal: Soft. Bowel sounds are normal. She exhibits no distension. There is no tenderness. There is no rebound.   Musculoskeletal: Normal range of motion. She exhibits no edema or deformity.   Lymphadenopathy:     She has no cervical adenopathy.   Neurological: She is alert and oriented to person, place, and time. No cranial nerve deficit.   Skin: Skin is warm. Rash noted. She is diaphoretic.   Psychiatric: She has a normal mood and affect.   Nursing note and vitals reviewed.      Recent Labs      18   0200   WBC  15.2*   RBC  5.58*   HEMOGLOBIN  16.2*   HEMATOCRIT  50.1*   MCV  89.4   MCH  28.7   MCHC  32.1*   RDW  43.0   PLATELETCT  346   MPV  10.6     Recent Labs      18   0200   SODIUM  130*   POTASSIUM  4.8   CHLORIDE  91*   CO2  7*   GLUCOSE  697*   BUN  21   CREATININE  0.93   CALCIUM  9.9     Recent Labs      18   0200   ALTSGPT  21   ASTSGOT  22    ALKPHOSPHAT  156*   TBILIRUBIN  0.6   LIPASE  18   GLUCOSE  697*                 Lab Results   Component Value Date    TROPONINI <0.01 07/27/2017     Urinalysis:    Lab Results  Component Value Date/Time   SPECGRAVITY 1.032 04/01/2018 1103   SPECGRAVITY 1.032 04/01/2018 1103   GLUCOSEUR >=1000 (A) 04/01/2018 1103   KETONES >=160 04/01/2018 1103   NITRITE Negative 04/01/2018 1103   WBCURINE 20-50 (A) 04/01/2018 1103   RBCURINE 2-5 (A) 04/01/2018 1103   BACTERIA Moderate (A) 04/01/2018 1103   EPITHELCELL Few 04/01/2018 1103        Imaging  No new imaging for review.    Assessment/Plan     I anticipate this patient will require at least two midnights for appropriate medical management, necessitating inpatient admission.    * Diabetic ketoacidosis (CMS-HCC)   Assessment & Plan    DKA protocol.  Continue fluid resuscitation.  Monitor in ICU with insulin infusion ongoing.         Type 1 diabetes mellitus (CMS-HCC)- (present on admission)   Assessment & Plan    Poor control despite insulin regimen, with reported compliance.  Multiple episodes of DKA.  Needs endocrinology follow-up, but apparently with insurance constraints.  Would benefit from  as well.          Gastroparesis due to DM (CMS-HCC)- (present on admission)   Assessment & Plan    With current exacerbation of the same, with nausea, vomiting and abdominal pain.          Overweight   Assessment & Plan    Body mass index is 29.9 kg/m².              VTE prophylaxis: SCD, Lovenox.

## 2018-04-08 NOTE — PROGRESS NOTES
Dr Jackelin Urbina paged at shift change to update the patient's K+ was 6.6 with no ectopy, patient is in SVT with a rate of 163, patient's respiration rate is 40 with a CO2 less than 5, the insulin bolus that was due at 4am this morning was sub q and not IV which we asked to change to IV    Orders for another liter bolus, D/C the insulin bolus of 7.5units from this morning as it was too late to give per Dr Jackelin Urbina, no other orders received.

## 2018-04-08 NOTE — PROGRESS NOTES
Unable to draw blood off of IV access, labs must be sent down as the patient's glucose is over 600. Charge nurse Sulma notified, attempting for a IV with ultrasound, Dr Gonda and Carmelita notified that we are unable to draw blood and may need a central line

## 2018-04-08 NOTE — PROGRESS NOTES
MD hardin notified of lab results.  Orders to give initial Insulin bolus (0.1units/kg IV x1) and increase insulin gtt by 2units/hr

## 2018-04-08 NOTE — ED NOTES
Cecelia from Lab called with critical result of C02 at 7 and glucose 697 at 0250. Critical lab result read back to Cecelia.    Dr. Jacobs notified of critical lab result at 0251.  Critical lab result read back by Dr. Jacobs.

## 2018-04-08 NOTE — PROGRESS NOTES
2 RN assessment completed with Gena PIEDRA Skin intact/ Patient has wounds located right ear.   Wound belkis.  BL elbow red but blanching. Pictures taken and in chart.

## 2018-04-08 NOTE — ED NOTES
IV established, NS infusing.  Pt appears very nauseous, +actively vomiting.  Medicated for n/v.  Pt also medicated for abd pain per MAR.  Mother at bedside

## 2018-04-08 NOTE — PROCEDURES
Procedure: Internal Jugular Central Venous Catheter, US guided   Indications:patient in DKA with  need for IV  access  Performed by: Dr Pa Urbina      Consent: Obtained from patient   Procedure Summary:   A time out was performed. The patients right neck was prepped and draped in sterile fashion using chlorhexidine scrub. Anesthesia was achieved with 1% lidocaine. The right IJ vein was accessed under US guidance using a finder needle and sheath. Venous blood was withdrawn and the sheath was advanced into the vein and the needle was withdrawn. A guidewire was advanced through the sheath. A small incision was made with a 10 blade scalpel and the sheath was exchanged for a dilator over the guidewire until appropriate dilatation was obtained. The dilator was removed and an 8 [place] central venous triple lumen was advanced over the guidewire and secured into place with sutures. At the time of procedure completion, all ports aspirated and flushed properly. Post procedure x-ray pending.   Complications: None   Estimated Blood Loss: 5 ml

## 2018-04-08 NOTE — PROGRESS NOTES
Dr Jackelin Urbina notified of infiltrated IV and difficulty with blood draws.  Also notified about persistent tachycardia with a rate 150-160s.  Lab to collect BMPs, rate change of NS to 175, no further boluses ordered. MD aware of tachycardia

## 2018-04-08 NOTE — ED TRIAGE NOTES
".Alis Sparks  .27 y.o.  .  Chief Complaint   Patient presents with   • High Blood Sugar     FSBG 445 at home; Reading \"hi\" in triage; pt vomiting in triage   • Abdominal Pain     R sided radiating to mid stomach   • Chest Pain     \"chest feels tight\"     PT to triage via w/c for above complaints; pt actively vomiting in triage; in obvious moderate pain holding R side of stomach; FSBG reads \"high\" in triage. Charge RN notified.   "

## 2018-04-08 NOTE — PROGRESS NOTES
[facility] Hospitalist Progress Note    Date of Service: 2018    Chief Complaint  27 y.o. female admitted 2018 with DKA    Interval Problem Update  Seen in the ICU on insulin drip      Consultants/Specialty  none    Disposition  TBD        Review of Systems   Constitutional: Positive for malaise/fatigue. Negative for chills and fever.   Cardiovascular: Negative for leg swelling and PND.   Gastrointestinal: Positive for abdominal pain, nausea and vomiting.   Genitourinary: Positive for flank pain. Negative for dysuria and hematuria.   Psychiatric/Behavioral: Negative for hallucinations. The patient is not nervous/anxious.    All other systems reviewed and are negative.     Physical Exam  Laboratory/Imaging   Hemodynamics  Temp (24hrs), Av.6 °C (97.8 °F), Min:36.5 °C (97.7 °F), Max:36.6 °C (97.9 °F)   Temperature: 36.5 °C (97.7 °F)  Pulse  Av.4  Min: 143  Max: 155 Heart Rate (Monitored): (!) 145  Blood Pressure: 107/73, NIBP: 103/54      Respiratory      Respiration: (!) 29, Pulse Oximetry: 99 %        RUL Breath Sounds: Clear, RML Breath Sounds: Clear, RLL Breath Sounds: Clear, NANCY Breath Sounds: Clear, LLL Breath Sounds: Clear    Fluids    Intake/Output Summary (Last 24 hours) at 18 1253  Last data filed at 18 1100   Gross per 24 hour   Intake          2088.13 ml   Output             1200 ml   Net           888.13 ml       Nutrition  Orders Placed This Encounter   Procedures   • Diet NPO     Standing Status:   Standing     Number of Occurrences:   1     Order Specific Question:   Restrict to:     Answer:   Ice Chips [2]     Physical Exam   Constitutional: She is oriented to person, place, and time. She appears well-developed and well-nourished.   HENT:   Head: Normocephalic and atraumatic.   Right Ear: External ear normal.   Left Ear: External ear normal.   Mouth/Throat: No oropharyngeal exudate.   Eyes: Conjunctivae are normal. Right eye exhibits no discharge. Left eye exhibits no  discharge. No scleral icterus.   Neck: Neck supple. No JVD present. No tracheal deviation present.   Cardiovascular: Regular rhythm.  Exam reveals no gallop and no friction rub.    No murmur heard.  tachycardia   Pulmonary/Chest: Effort normal and breath sounds normal. No stridor. Tachypnea noted. No respiratory distress. She has no wheezes. She has no rales. She exhibits no tenderness.   Abdominal: Soft. Bowel sounds are normal. She exhibits no distension and no mass. There is no tenderness. There is no rebound and no guarding.   Musculoskeletal: She exhibits no edema or tenderness.   Neurological: She is alert and oriented to person, place, and time. No cranial nerve deficit. She exhibits normal muscle tone.   Skin: Skin is warm and dry. She is not diaphoretic. No cyanosis. Nails show no clubbing.   Psychiatric: She has a normal mood and affect. She is slowed.   Nursing note and vitals reviewed.      Recent Labs      04/08/18   0200   WBC  15.2*   RBC  5.58*   HEMOGLOBIN  16.2*   HEMATOCRIT  50.1*   MCV  89.4   MCH  28.7   MCHC  32.1*   RDW  43.0   PLATELETCT  346   MPV  10.6     Recent Labs      04/08/18   0532  04/08/18   0647  04/08/18   0830  04/08/18   1202   SODIUM  134*   --   140  139   POTASSIUM  6.6*   --   5.9*  5.1   CHLORIDE  101   --   110  115*   CO2  <5*   --   6*  <5*   GLUCOSE  725*  634*  515*  292*   BUN  25*   --   23*  22   CREATININE  0.98   --   1.06  0.73   CALCIUM  8.4*   --   7.9*  8.2*                      Assessment/Plan     * Diabetic ketoacidosis (CMS-HCC)   Assessment & Plan    Continue insulin drip and IVF  Monitor CBG's and chem pannel        Type 1 diabetes mellitus (CMS-HCC)- (present on admission)   Assessment & Plan    Uncontrolled with hyperglycemia  Hba1c 13.3  ? Sec to non compliance given repeated hospitalization for DKA  Currently on insulin drip        Gastroparesis due to DM (CMS-HCC)- (present on admission)   Assessment & Plan    Antiemetics as needed        UTI  (urinary tract infection)   Assessment & Plan    D/c cipro flagyl  ceftriaxone        Hyperkalemia   Assessment & Plan    Sec to acidosis  Monitor with hydration and insulin drip        Overweight   Assessment & Plan    Body mass index is 29.9 kg/m².            Quality-Core Measures   Reviewed items::  Labs reviewed, Medications reviewed and Radiology images reviewed  Pitt catheter::  No Pitt  DVT prophylaxis pharmacological::  Enoxaparin (Lovenox)  Antibiotics:  Treating active infection/contamination beyond 24 hours perioperative coverage      >Patient is critically ill.   >The patient is having :DKA  >The vital organ system that is effected is the:endocrine  >If untreated there is a high chance of deterioration into: death  >The critical care that I am providing today is: insulin drip and fluid management  >The critical care that has been undertaken is medically complex.   >There has been no overlap in critical care time.   Critical care time not including procedures, no overlap: 35 minutes

## 2018-04-08 NOTE — PROGRESS NOTES
Med rec completed by interview with patient at bedside  Pt reported that she is using Apidra per sliding scale with a minimum of 23 units per meal  She reported that sliding scale insulin dosages doubles as blood glucose increases by 50 units but was unsure of exact scale  Pt has received abx within [facility] including ceftriaxone, cipro, and metronidazole  Allergies reviewed

## 2018-04-08 NOTE — ASSESSMENT & PLAN NOTE
Uncontrolled with hyperglycemia  Hba1c 13.3    Transitioned to NPH and sliding scale insulin with good control  Again reviewed compliance issues with her

## 2018-04-09 PROBLEM — E87.6 HYPOKALEMIA: Status: ACTIVE | Noted: 2018-04-09

## 2018-04-09 PROBLEM — E87.5 HYPERKALEMIA: Status: RESOLVED | Noted: 2018-04-08 | Resolved: 2018-04-09

## 2018-04-09 LAB
ANION GAP SERPL CALC-SCNC: 7 MMOL/L (ref 0–11.9)
BASOPHILS # BLD AUTO: 0.4 % (ref 0–1.8)
BASOPHILS # BLD: 0.03 K/UL (ref 0–0.12)
BUN SERPL-MCNC: 13 MG/DL (ref 8–22)
CALCIUM SERPL-MCNC: 8.4 MG/DL (ref 8.5–10.5)
CHLORIDE SERPL-SCNC: 118 MMOL/L (ref 96–112)
CO2 SERPL-SCNC: 18 MMOL/L (ref 20–33)
CREAT SERPL-MCNC: 0.51 MG/DL (ref 0.5–1.4)
EOSINOPHIL # BLD AUTO: 0.03 K/UL (ref 0–0.51)
EOSINOPHIL NFR BLD: 0.4 % (ref 0–6.9)
ERYTHROCYTE [DISTWIDTH] IN BLOOD BY AUTOMATED COUNT: 44 FL (ref 35.9–50)
GLUCOSE BLD-MCNC: 105 MG/DL (ref 65–99)
GLUCOSE BLD-MCNC: 106 MG/DL (ref 65–99)
GLUCOSE BLD-MCNC: 111 MG/DL (ref 65–99)
GLUCOSE BLD-MCNC: 137 MG/DL (ref 65–99)
GLUCOSE BLD-MCNC: 188 MG/DL (ref 65–99)
GLUCOSE BLD-MCNC: 60 MG/DL (ref 65–99)
GLUCOSE BLD-MCNC: 66 MG/DL (ref 65–99)
GLUCOSE BLD-MCNC: 89 MG/DL (ref 65–99)
GLUCOSE BLD-MCNC: 99 MG/DL (ref 65–99)
GLUCOSE SERPL-MCNC: 64 MG/DL (ref 65–99)
HCT VFR BLD AUTO: 33.4 % (ref 37–47)
HGB BLD-MCNC: 11.4 G/DL (ref 12–16)
IMM GRANULOCYTES # BLD AUTO: 0.11 K/UL (ref 0–0.11)
IMM GRANULOCYTES NFR BLD AUTO: 1.3 % (ref 0–0.9)
LYMPHOCYTES # BLD AUTO: 2.07 K/UL (ref 1–4.8)
LYMPHOCYTES NFR BLD: 24.5 % (ref 22–41)
MCH RBC QN AUTO: 29.3 PG (ref 27–33)
MCHC RBC AUTO-ENTMCNC: 34.1 G/DL (ref 33.6–35)
MCV RBC AUTO: 85.9 FL (ref 81.4–97.8)
MONOCYTES # BLD AUTO: 1.08 K/UL (ref 0–0.85)
MONOCYTES NFR BLD AUTO: 12.8 % (ref 0–13.4)
NEUTROPHILS # BLD AUTO: 5.13 K/UL (ref 2–7.15)
NEUTROPHILS NFR BLD: 60.6 % (ref 44–72)
NRBC # BLD AUTO: 0 K/UL
NRBC BLD-RTO: 0 /100 WBC
PLATELET # BLD AUTO: 233 K/UL (ref 164–446)
PMV BLD AUTO: 9.5 FL (ref 9–12.9)
POTASSIUM SERPL-SCNC: 3.5 MMOL/L (ref 3.6–5.5)
RBC # BLD AUTO: 3.89 M/UL (ref 4.2–5.4)
SODIUM SERPL-SCNC: 143 MMOL/L (ref 135–145)
WBC # BLD AUTO: 8.5 K/UL (ref 4.8–10.8)

## 2018-04-09 PROCEDURE — G8980 MOBILITY D/C STATUS: HCPCS | Mod: CI

## 2018-04-09 PROCEDURE — 80048 BASIC METABOLIC PNL TOTAL CA: CPT

## 2018-04-09 PROCEDURE — 85025 COMPLETE CBC W/AUTO DIFF WBC: CPT

## 2018-04-09 PROCEDURE — 99232 SBSQ HOSP IP/OBS MODERATE 35: CPT | Performed by: HOSPITALIST

## 2018-04-09 PROCEDURE — G8979 MOBILITY GOAL STATUS: HCPCS | Mod: CI

## 2018-04-09 PROCEDURE — 700102 HCHG RX REV CODE 250 W/ 637 OVERRIDE(OP): Performed by: HOSPITALIST

## 2018-04-09 PROCEDURE — A9270 NON-COVERED ITEM OR SERVICE: HCPCS | Performed by: HOSPITALIST

## 2018-04-09 PROCEDURE — 82962 GLUCOSE BLOOD TEST: CPT | Mod: 91

## 2018-04-09 PROCEDURE — 700105 HCHG RX REV CODE 258: Performed by: INTERNAL MEDICINE

## 2018-04-09 PROCEDURE — 700111 HCHG RX REV CODE 636 W/ 250 OVERRIDE (IP): Performed by: HOSPITALIST

## 2018-04-09 PROCEDURE — 770001 HCHG ROOM/CARE - MED/SURG/GYN PRIV*

## 2018-04-09 PROCEDURE — 97161 PT EVAL LOW COMPLEX 20 MIN: CPT

## 2018-04-09 PROCEDURE — G8978 MOBILITY CURRENT STATUS: HCPCS | Mod: CI

## 2018-04-09 RX ORDER — POTASSIUM CHLORIDE 20 MEQ/1
40 TABLET, EXTENDED RELEASE ORAL 2 TIMES DAILY
Status: COMPLETED | OUTPATIENT
Start: 2018-04-09 | End: 2018-04-09

## 2018-04-09 RX ADMIN — SODIUM CHLORIDE: 9 INJECTION, SOLUTION INTRAVENOUS at 14:25

## 2018-04-09 RX ADMIN — MORPHINE SULFATE 2 MG: 4 INJECTION INTRAVENOUS at 14:25

## 2018-04-09 RX ADMIN — SODIUM CHLORIDE: 9 INJECTION, SOLUTION INTRAVENOUS at 04:14

## 2018-04-09 RX ADMIN — POTASSIUM CHLORIDE 40 MEQ: 1500 TABLET, EXTENDED RELEASE ORAL at 20:57

## 2018-04-09 RX ADMIN — CEFTRIAXONE SODIUM 1 G: 10 INJECTION, POWDER, FOR SOLUTION INTRAVENOUS at 20:56

## 2018-04-09 RX ADMIN — PROCHLORPERAZINE EDISYLATE 10 MG: 5 INJECTION INTRAMUSCULAR; INTRAVENOUS at 11:13

## 2018-04-09 RX ADMIN — ATORVASTATIN CALCIUM 20 MG: 20 TABLET, FILM COATED ORAL at 08:32

## 2018-04-09 RX ADMIN — POTASSIUM CHLORIDE 40 MEQ: 1500 TABLET, EXTENDED RELEASE ORAL at 10:33

## 2018-04-09 RX ADMIN — PROCHLORPERAZINE EDISYLATE 10 MG: 5 INJECTION INTRAMUSCULAR; INTRAVENOUS at 19:55

## 2018-04-09 RX ADMIN — INSULIN HUMAN 20 UNITS: 100 INJECTION, SUSPENSION SUBCUTANEOUS at 07:42

## 2018-04-09 RX ADMIN — Medication 325 MG: at 08:32

## 2018-04-09 RX ADMIN — ONDANSETRON HYDROCHLORIDE 4 MG: 2 INJECTION INTRAMUSCULAR; INTRAVENOUS at 14:25

## 2018-04-09 RX ADMIN — INSULIN HUMAN 20 UNITS: 100 INJECTION, SUSPENSION SUBCUTANEOUS at 16:29

## 2018-04-09 RX ADMIN — ONDANSETRON HYDROCHLORIDE 4 MG: 2 INJECTION INTRAMUSCULAR; INTRAVENOUS at 08:43

## 2018-04-09 RX ADMIN — INSULIN HUMAN 3 UNITS: 100 INJECTION, SOLUTION PARENTERAL at 07:41

## 2018-04-09 RX ADMIN — SODIUM CHLORIDE: 9 INJECTION, SOLUTION INTRAVENOUS at 23:32

## 2018-04-09 RX ADMIN — ONDANSETRON HYDROCHLORIDE 4 MG: 2 INJECTION INTRAMUSCULAR; INTRAVENOUS at 03:11

## 2018-04-09 RX ADMIN — ENOXAPARIN SODIUM 40 MG: 100 INJECTION SUBCUTANEOUS at 08:33

## 2018-04-09 RX ADMIN — STANDARDIZED SENNA CONCENTRATE AND DOCUSATE SODIUM 2 TABLET: 8.6; 5 TABLET, FILM COATED ORAL at 08:32

## 2018-04-09 RX ADMIN — MORPHINE SULFATE 2 MG: 4 INJECTION INTRAVENOUS at 08:40

## 2018-04-09 ASSESSMENT — ENCOUNTER SYMPTOMS
VOMITING: 0
FEVER: 0
HEMOPTYSIS: 0
SPEECH CHANGE: 0
ABDOMINAL PAIN: 1
PND: 0
NAUSEA: 0
FOCAL WEAKNESS: 0
HALLUCINATIONS: 0
BLOOD IN STOOL: 0
SEIZURES: 0
SHORTNESS OF BREATH: 0
CHILLS: 0
SPUTUM PRODUCTION: 0
NERVOUS/ANXIOUS: 0

## 2018-04-09 ASSESSMENT — PAIN SCALES - GENERAL
PAINLEVEL_OUTOF10: 8
PAINLEVEL_OUTOF10: 4
PAINLEVEL_OUTOF10: 2
PAINLEVEL_OUTOF10: 0
PAINLEVEL_OUTOF10: 8
PAINLEVEL_OUTOF10: 1
PAINLEVEL_OUTOF10: 2
PAINLEVEL_OUTOF10: 5
PAINLEVEL_OUTOF10: 2
PAINLEVEL_OUTOF10: 2

## 2018-04-09 ASSESSMENT — PATIENT HEALTH QUESTIONNAIRE - PHQ9
2. FEELING DOWN, DEPRESSED, IRRITABLE, OR HOPELESS: NOT AT ALL
1. LITTLE INTEREST OR PLEASURE IN DOING THINGS: NOT AT ALL
SUM OF ALL RESPONSES TO PHQ9 QUESTIONS 1 AND 2: 0

## 2018-04-09 ASSESSMENT — COGNITIVE AND FUNCTIONAL STATUS - GENERAL
SUGGESTED CMS G CODE MODIFIER DAILY ACTIVITY: CJ
DAILY ACTIVITIY SCORE: 22
TOILETING: A LITTLE
SUGGESTED CMS G CODE MODIFIER MOBILITY: CI
MOBILITY SCORE: 23
HELP NEEDED FOR BATHING: A LITTLE
CLIMB 3 TO 5 STEPS WITH RAILING: A LITTLE
SUGGESTED CMS G CODE MODIFIER MOBILITY: CI
CLIMB 3 TO 5 STEPS WITH RAILING: A LITTLE
MOBILITY SCORE: 23

## 2018-04-09 ASSESSMENT — GAIT ASSESSMENTS
DISTANCE (FEET): 250
DEVIATION: BRADYKINETIC
GAIT LEVEL OF ASSIST: SUPERVISED

## 2018-04-09 ASSESSMENT — LIFESTYLE VARIABLES
EVER_SMOKED: NEVER
ALCOHOL_USE: NO

## 2018-04-09 NOTE — PROGRESS NOTES
FSBG at 2110 was 32, 4 ounces of juice given. 15 minute recheck at 46, 4 ounces of juice given, 15 minute recheck at 78, 4 ounces of fruit juice given, 15 minute recheck at 85, 4 ounces of fruit juice given, 15 minute recheck at 98.

## 2018-04-09 NOTE — THERAPY
"Physical Therapy Evaluation completed.   Bed Mobility:  Supine to Sit: Supervised  Transfers: Sit to Stand: Supervised  Gait: Level Of Assist: Supervised with No Equipment Needed       Plan of Care: Patient with no further skilled PT needs in the acute care setting at this time  Discharge Recommendations: Equipment: No Equipment Needed. Post-acute therapy Currently anticipate no further skilled therapy needs once patient is discharged from the inpatient setting.    Pt is a 27 year old female admitted to the hospital for hyperglycemia, abdominal pain, chest pain and DKA. Pt reports multiple hospitalizations over the past few months due to uncontrolled diabetes. At time of initial evaluation, pt overall doing well with mobility. Pt performing all mobility at SBA to SPV level. Pt's strength, balance and coordination are all WNL's. Pt was able to ambulate at SPV level, however, did start to become dizzy and nauseated while ambulating, therefore SBA required. At this time, pt does not demonstrate ongoing need for skilled PT intervention while in the acute care setting. Pt is clear for DC home, no post acute therapy or equipment needs identified    See \"Rehab Therapy-Acute\" Patient Summary Report for complete documentation.     "

## 2018-04-09 NOTE — PROGRESS NOTES
330 FSBG 66, 4 ounces juice given, 15 minute recheck 60, 4 ounces juice given, 15 minute recheck 85, 4 ounces juice given, 15 minute recheck 111. Snack given. Hour recheck 134.

## 2018-04-09 NOTE — PROGRESS NOTES
[facility] Hospitalist Progress Note    Date of Service: 2018    Chief Complaint  27 y.o. female admitted 2018 with DKA    Interval Problem Update   transitioned off insulin drip  Complains of generalized fatigue      Consultants/Specialty  none    Disposition  TBD        Review of Systems   Constitutional: Positive for malaise/fatigue. Negative for chills and fever.   Respiratory: Negative for hemoptysis, sputum production and shortness of breath.    Cardiovascular: Negative for leg swelling and PND.   Gastrointestinal: Positive for abdominal pain (improved). Negative for blood in stool, melena, nausea and vomiting.   Genitourinary: Negative for dysuria and hematuria.   Neurological: Negative for speech change, focal weakness and seizures.   Psychiatric/Behavioral: Negative for hallucinations. The patient is not nervous/anxious.    All other systems reviewed and are negative.     Physical Exam  Laboratory/Imaging   Hemodynamics  Temp (24hrs), Av.6 °C (97.8 °F), Min:36.3 °C (97.3 °F), Max:36.9 °C (98.5 °F)   Temperature: 36.4 °C (97.5 °F)  Pulse  Av  Min: 88  Max: 161 Heart Rate (Monitored): 94  NIBP: 105/54      Respiratory      Respiration: 18, Pulse Oximetry: 94 %        RUL Breath Sounds: Clear, RML Breath Sounds: Clear, RLL Breath Sounds: Clear, NANCY Breath Sounds: Clear, LLL Breath Sounds: Clear    Fluids    Intake/Output Summary (Last 24 hours) at 18 1430  Last data filed at 18 1200   Gross per 24 hour   Intake             3532 ml   Output             1000 ml   Net             2532 ml       Nutrition  Orders Placed This Encounter   Procedures   • DIET ORDER     Standing Status:   Standing     Number of Occurrences:   1     Order Specific Question:   Diet:     Answer:   Diabetic [3]     Physical Exam   Constitutional: She is oriented to person, place, and time. She appears well-developed and well-nourished.   HENT:   Head: Normocephalic and atraumatic.   Right Ear: External ear normal.    Left Ear: External ear normal.   Mouth/Throat: No oropharyngeal exudate.   Eyes: Conjunctivae are normal. Pupils are equal, round, and reactive to light. Right eye exhibits no discharge. Left eye exhibits no discharge. No scleral icterus.   Neck: Neck supple. No JVD present. No tracheal deviation present.   Cardiovascular: Regular rhythm.  Exam reveals no gallop and no friction rub.    No murmur heard.  tachycardia   Pulmonary/Chest: Effort normal and breath sounds normal. Tachypnea noted. No respiratory distress. She has no wheezes. She exhibits no tenderness.   Abdominal: Soft. Bowel sounds are normal. She exhibits no distension. There is no tenderness. There is no rebound.   Musculoskeletal: She exhibits no edema or tenderness.   Neurological: She is alert and oriented to person, place, and time. No cranial nerve deficit. She exhibits normal muscle tone.   Skin: Skin is warm and dry. She is not diaphoretic. No cyanosis or erythema. Nails show no clubbing.   Psychiatric: She has a normal mood and affect. Her behavior is normal. Cognition and memory are normal.   Nursing note and vitals reviewed.      Recent Labs      04/08/18   0200  04/09/18   0317   WBC  15.2*  8.5   RBC  5.58*  3.89*   HEMOGLOBIN  16.2*  11.4*   HEMATOCRIT  50.1*  33.4*   MCV  89.4  85.9   MCH  28.7  29.3   MCHC  32.1*  34.1   RDW  43.0  44.0   PLATELETCT  346  233   MPV  10.6  9.5     Recent Labs      04/08/18   1202  04/08/18   1700  04/08/18   1800  04/09/18   0317   SODIUM  139  142   --   143   POTASSIUM  5.1  3.4*  3.3*  3.5*   CHLORIDE  115*  120*   --   118*   CO2  <5*  14*   --   18*   GLUCOSE  292*  126*   --   64*   BUN  22  19   --   13   CREATININE  0.73  0.63   --   0.51   CALCIUM  8.2*  7.8*   --   8.4*                      Assessment/Plan     * Diabetic ketoacidosis (CMS-HCC)   Assessment & Plan    resolved        Type 1 diabetes mellitus (CMS-HCC)- (present on admission)   Assessment & Plan    Uncontrolled with  hyperglycemia  Hba1c 13.3    Transitioned to NPH and sliding scale insulin with good control  I had a long discussion with the patient about the importance to comply with prescribed insulin diet and follow-up with her endocrinologist shortly after discharge given her recurrent hospitalizations            Gastroparesis due to DM (CMS-HCC)- (present on admission)   Assessment & Plan    Antiemetics as needed          Hypokalemia   Assessment & Plan    Replete and monitor         UTI (urinary tract infection)   Assessment & Plan    D/c cipro flagyl  On ceftriaxone complete 3 day course        Overweight   Assessment & Plan    Body mass index is 29.9 kg/m².          Transfer to medical floor   Monitor blood sugars if they remain stable and tolerating diet likely discharge home tomorrow   Quality-Core Measures   Reviewed items::  Labs reviewed, Medications reviewed and Radiology images reviewed  Pitt catheter::  No Pitt  DVT prophylaxis pharmacological::  Enoxaparin (Lovenox)  Antibiotics:  Treating active infection/contamination beyond 24 hours perioperative coverage

## 2018-04-09 NOTE — CARE PLAN
Problem: Fluid Volume:  Goal: Will maintain balanced intake and output    Intervention: Monitor, educate, and encourage compliance with therapeutic intake of liquids  Pt on D10 1/2ns at 125      Problem: Discharge Barriers/Planning  Goal: Patient's continuum of care needs will be met    Intervention: Assess potential discharge barriers on admission and throughout hospital stay  Pt needs discharge planning to help with home meds

## 2018-04-09 NOTE — DISCHARGE PLANNING
Medical SW    Sw attended AM IDT Rounds.    Per face sheet, pt is resident of [place], single, 26yo, female, admitted 4/8 for DKA, and carries  [place] [employer].    RN reports, pt A/O x4, central line in place, no gonzalez, ambulating, transfer status    Plan: Sw to assist w/ d/c planning as needed.

## 2018-04-10 LAB
ANION GAP SERPL CALC-SCNC: 5 MMOL/L (ref 0–11.9)
BUN SERPL-MCNC: 7 MG/DL (ref 8–22)
CALCIUM SERPL-MCNC: 8.3 MG/DL (ref 8.5–10.5)
CHLORIDE SERPL-SCNC: 116 MMOL/L (ref 96–112)
CO2 SERPL-SCNC: 21 MMOL/L (ref 20–33)
CREAT SERPL-MCNC: 0.47 MG/DL (ref 0.5–1.4)
GLUCOSE BLD-MCNC: 137 MG/DL (ref 65–99)
GLUCOSE BLD-MCNC: 182 MG/DL (ref 65–99)
GLUCOSE BLD-MCNC: 239 MG/DL (ref 65–99)
GLUCOSE BLD-MCNC: 72 MG/DL (ref 65–99)
GLUCOSE SERPL-MCNC: 53 MG/DL (ref 65–99)
MAGNESIUM SERPL-MCNC: 1.7 MG/DL (ref 1.5–2.5)
PHOSPHATE SERPL-MCNC: 2.4 MG/DL (ref 2.5–4.5)
POTASSIUM SERPL-SCNC: 3.4 MMOL/L (ref 3.6–5.5)
SODIUM SERPL-SCNC: 142 MMOL/L (ref 135–145)

## 2018-04-10 PROCEDURE — 700102 HCHG RX REV CODE 250 W/ 637 OVERRIDE(OP): Performed by: HOSPITALIST

## 2018-04-10 PROCEDURE — 84100 ASSAY OF PHOSPHORUS: CPT

## 2018-04-10 PROCEDURE — 80048 BASIC METABOLIC PNL TOTAL CA: CPT

## 2018-04-10 PROCEDURE — A9270 NON-COVERED ITEM OR SERVICE: HCPCS | Performed by: HOSPITALIST

## 2018-04-10 PROCEDURE — 700111 HCHG RX REV CODE 636 W/ 250 OVERRIDE (IP): Performed by: HOSPITALIST

## 2018-04-10 PROCEDURE — 700105 HCHG RX REV CODE 258: Performed by: HOSPITALIST

## 2018-04-10 PROCEDURE — 770001 HCHG ROOM/CARE - MED/SURG/GYN PRIV*

## 2018-04-10 PROCEDURE — 82962 GLUCOSE BLOOD TEST: CPT | Mod: 91

## 2018-04-10 PROCEDURE — 700101 HCHG RX REV CODE 250: Performed by: HOSPITALIST

## 2018-04-10 PROCEDURE — 83735 ASSAY OF MAGNESIUM: CPT

## 2018-04-10 PROCEDURE — 99232 SBSQ HOSP IP/OBS MODERATE 35: CPT | Performed by: HOSPITALIST

## 2018-04-10 PROCEDURE — 700105 HCHG RX REV CODE 258: Performed by: INTERNAL MEDICINE

## 2018-04-10 RX ORDER — MAGNESIUM SULFATE HEPTAHYDRATE 40 MG/ML
2 INJECTION, SOLUTION INTRAVENOUS ONCE
Status: COMPLETED | OUTPATIENT
Start: 2018-04-10 | End: 2018-04-10

## 2018-04-10 RX ADMIN — POTASSIUM PHOSPHATE, MONOBASIC AND POTASSIUM PHOSPHATE, DIBASIC 30 MMOL: 224; 236 INJECTION, SOLUTION INTRAVENOUS at 11:55

## 2018-04-10 RX ADMIN — CEFTRIAXONE SODIUM 1 G: 10 INJECTION, POWDER, FOR SOLUTION INTRAVENOUS at 20:31

## 2018-04-10 RX ADMIN — STANDARDIZED SENNA CONCENTRATE AND DOCUSATE SODIUM 2 TABLET: 8.6; 5 TABLET, FILM COATED ORAL at 08:04

## 2018-04-10 RX ADMIN — MORPHINE SULFATE 2 MG: 4 INJECTION INTRAVENOUS at 17:17

## 2018-04-10 RX ADMIN — ONDANSETRON HYDROCHLORIDE 4 MG: 2 INJECTION INTRAMUSCULAR; INTRAVENOUS at 08:59

## 2018-04-10 RX ADMIN — ENOXAPARIN SODIUM 40 MG: 100 INJECTION SUBCUTANEOUS at 08:04

## 2018-04-10 RX ADMIN — Medication 325 MG: at 08:04

## 2018-04-10 RX ADMIN — INSULIN HUMAN 3 UNITS: 100 INJECTION, SOLUTION PARENTERAL at 11:19

## 2018-04-10 RX ADMIN — MAGNESIUM SULFATE HEPTAHYDRATE 2 G: 40 INJECTION, SOLUTION INTRAVENOUS at 12:00

## 2018-04-10 RX ADMIN — PROCHLORPERAZINE EDISYLATE 10 MG: 5 INJECTION INTRAMUSCULAR; INTRAVENOUS at 19:08

## 2018-04-10 RX ADMIN — MORPHINE SULFATE 2 MG: 4 INJECTION INTRAVENOUS at 05:00

## 2018-04-10 RX ADMIN — INSULIN HUMAN 15 UNITS: 100 INJECTION, SUSPENSION SUBCUTANEOUS at 16:35

## 2018-04-10 RX ADMIN — INSULIN HUMAN 4 UNITS: 100 INJECTION, SOLUTION PARENTERAL at 16:35

## 2018-04-10 RX ADMIN — SODIUM CHLORIDE: 9 INJECTION, SOLUTION INTRAVENOUS at 08:05

## 2018-04-10 RX ADMIN — MORPHINE SULFATE 2 MG: 4 INJECTION INTRAVENOUS at 08:59

## 2018-04-10 RX ADMIN — ONDANSETRON HYDROCHLORIDE 4 MG: 2 INJECTION INTRAMUSCULAR; INTRAVENOUS at 17:17

## 2018-04-10 RX ADMIN — ATORVASTATIN CALCIUM 20 MG: 20 TABLET, FILM COATED ORAL at 08:04

## 2018-04-10 ASSESSMENT — ENCOUNTER SYMPTOMS
LOSS OF CONSCIOUSNESS: 0
SHORTNESS OF BREATH: 0
ABDOMINAL PAIN: 0
BACK PAIN: 0
HEADACHES: 0
NAUSEA: 1
COUGH: 0
DIZZINESS: 0
CHILLS: 0
VOMITING: 1
FEVER: 0
DIARRHEA: 0

## 2018-04-10 ASSESSMENT — PAIN SCALES - GENERAL
PAINLEVEL_OUTOF10: 0
PAINLEVEL_OUTOF10: 0
PAINLEVEL_OUTOF10: 2
PAINLEVEL_OUTOF10: 0
PAINLEVEL_OUTOF10: 0
PAINLEVEL_OUTOF10: 8
PAINLEVEL_OUTOF10: 0
PAINLEVEL_OUTOF10: 0
PAINLEVEL_OUTOF10: 8
PAINLEVEL_OUTOF10: 2
PAINLEVEL_OUTOF10: 0
PAINLEVEL_OUTOF10: 0
PAINLEVEL_OUTOF10: 8
PAINLEVEL_OUTOF10: 0

## 2018-04-10 NOTE — THERAPY
Occupational Therapy Contact Note    Per RN, pt has been up by self and completing ADLs with no concerns. Pt has no concerns at this time and does not require acute OT services. Please re-order if functional status changes.    Alida Schaffer, OTR/L  Pager: [phone]

## 2018-04-10 NOTE — PROGRESS NOTES
[facility] Hospitalist Progress Note    Date of Service: 4/10/2018    Chief Complaint  27 y.o. female admitted 2018 with PMHx of TIDM, diabetic gastroparesis.  Admitted with DKA    Interval Problem Update  Feeling better.  This am was unable to keep her breakfast down.  This afternoon ate her lunch slowly and so far has done well.  No other complaints    Consultants/Specialty      Disposition  OK to floor  Dc tomorrow if keeping dinner and breakfast down with reasonable intake        Review of Systems   Constitutional: Negative for chills and fever.   Respiratory: Negative for cough and shortness of breath.    Cardiovascular: Negative for chest pain.   Gastrointestinal: Positive for nausea and vomiting. Negative for abdominal pain and diarrhea.   Musculoskeletal: Negative for back pain.   Skin: Negative for rash.   Neurological: Negative for dizziness, loss of consciousness and headaches.      Physical Exam  Laboratory/Imaging   Hemodynamics  Temp (24hrs), Av.3 °C (97.4 °F), Min:36.3 °C (97.3 °F), Max:36.4 °C (97.5 °F)   Temperature: 36.3 °C (97.3 °F)  Pulse  Av.3  Min: 88  Max: 161 Heart Rate (Monitored): 100  NIBP: 120/66      Respiratory      Respiration: 18, Pulse Oximetry: 94 %        RUL Breath Sounds: Clear, RML Breath Sounds: Clear, RLL Breath Sounds: Clear, NANCY Breath Sounds: Clear, LLL Breath Sounds: Clear    Fluids    Intake/Output Summary (Last 24 hours) at 04/10/18 0550  Last data filed at 04/10/18 0400   Gross per 24 hour   Intake             2300 ml   Output             1600 ml   Net              700 ml       Nutrition  Orders Placed This Encounter   Procedures   • DIET ORDER     Standing Status:   Standing     Number of Occurrences:   1     Order Specific Question:   Diet:     Answer:   Diabetic [3]     Physical Exam   Constitutional: She is oriented to person, place, and time. She appears well-developed and well-nourished. No distress.   HENT:   Head: Normocephalic and atraumatic.   Neck:  No JVD present.   Cardiovascular: Normal rate and regular rhythm.    Pulmonary/Chest: Effort normal. No stridor. No respiratory distress. She has no wheezes. She has no rales.   Abdominal: Soft. There is no tenderness. There is no rebound and no guarding.   Musculoskeletal: She exhibits no edema.   Neurological: She is oriented to person, place, and time.   Skin: Skin is warm and dry. No rash noted. She is not diaphoretic.   Psychiatric: She has a normal mood and affect. Thought content normal.   Nursing note and vitals reviewed.      Recent Labs      04/08/18   0200  04/09/18   0317   WBC  15.2*  8.5   RBC  5.58*  3.89*   HEMOGLOBIN  16.2*  11.4*   HEMATOCRIT  50.1*  33.4*   MCV  89.4  85.9   MCH  28.7  29.3   MCHC  32.1*  34.1   RDW  43.0  44.0   PLATELETCT  346  233   MPV  10.6  9.5     Recent Labs      04/08/18   1700  04/08/18   1800  04/09/18   0317  04/10/18   0500   SODIUM  142   --   143  142   POTASSIUM  3.4*  3.3*  3.5*  3.4*   CHLORIDE  120*   --   118*  116*   CO2  14*   --   18*  21   GLUCOSE  126*   --   64*  53*   BUN  19   --   13  7*   CREATININE  0.63   --   0.51  0.47*   CALCIUM  7.8*   --   8.4*  8.3*                      Assessment/Plan     * Diabetic ketoacidosis (CMS-HCC)   Assessment & Plan    resolved        Type 1 diabetes mellitus (CMS-HCC)- (present on admission)   Assessment & Plan    Uncontrolled with hyperglycemia  Hba1c 13.3    Transitioned to NPH and sliding scale insulin with good control  Again reviewed compliance issues with her          Gastroparesis due to DM (CMS-HCC)- (present on admission)   Assessment & Plan    Antiemetics as needed  Maintained on reglan TID as outpt          Hypokalemia   Assessment & Plan    Replete and monitor         UTI (urinary tract infection)   Assessment & Plan    Today will be day 3 of Abx        Overweight   Assessment & Plan    Body mass index is 29.9 kg/m².            Quality-Core Measures   Reviewed items::  Labs reviewed, Medications reviewed  and Radiology images reviewed  Pitt catheter::  No Pitt  DVT prophylaxis pharmacological::  Not indicated at this time, ambulatory  DVT prophylaxis - mechanical:  Not indicated at this time, ambulatory  Ulcer Prophylaxis::  Not indicated

## 2018-04-10 NOTE — CARE PLAN
Problem: Safety  Goal: Will remain free from injury    Intervention: Provide assistance with mobility  Pt mobilized to bathroom multiple times. Pt tolerated well.       Problem: Bowel/Gastric:  Goal: Normal bowel function is maintained or improved    Intervention: Educate patient and significant other/support system about diet, fluid intake, medications and activity to promote bowel function  Pt educated about importance of food/water intake to help promote bowel motility and hydration.

## 2018-04-10 NOTE — CARE PLAN
Problem: Pain Management  Goal: Pain level will decrease to patient's comfort goal    Intervention: Follow pain managment plan developed in collaboration with patient and Interdisciplinary Team  Pain assessed and documented per unit protocol and appropriate pharmacological and non-pharmacological interventions implemented. Reviewed pain goals with patient and family.        Problem: Infection  Goal: Will remain free from infection    Intervention: Assess signs and symptoms of infection  Patients individual infection risk assessed. Monitored for signs and symptoms of infection throughout shift. Washed hands or foamed in and out every encounter. Utilized universal precautions. Scrubbed hub before access to IV lines per protocol.

## 2018-04-11 VITALS
DIASTOLIC BLOOD PRESSURE: 73 MMHG | SYSTOLIC BLOOD PRESSURE: 107 MMHG | HEART RATE: 68 BPM | BODY MASS INDEX: 28.28 KG/M2 | RESPIRATION RATE: 18 BRPM | OXYGEN SATURATION: 95 % | HEIGHT: 65 IN | WEIGHT: 169.75 LBS | TEMPERATURE: 98.2 F

## 2018-04-11 LAB
GLUCOSE BLD-MCNC: 144 MG/DL (ref 65–99)
GLUCOSE BLD-MCNC: 151 MG/DL (ref 65–99)

## 2018-04-11 PROCEDURE — 700102 HCHG RX REV CODE 250 W/ 637 OVERRIDE(OP): Performed by: HOSPITALIST

## 2018-04-11 PROCEDURE — 99239 HOSP IP/OBS DSCHRG MGMT >30: CPT | Performed by: HOSPITALIST

## 2018-04-11 PROCEDURE — 700111 HCHG RX REV CODE 636 W/ 250 OVERRIDE (IP): Performed by: HOSPITALIST

## 2018-04-11 PROCEDURE — A9270 NON-COVERED ITEM OR SERVICE: HCPCS | Performed by: HOSPITALIST

## 2018-04-11 PROCEDURE — 82962 GLUCOSE BLOOD TEST: CPT

## 2018-04-11 RX ORDER — METOCLOPRAMIDE HYDROCHLORIDE 5 MG/ML
10 INJECTION INTRAMUSCULAR; INTRAVENOUS EVERY 6 HOURS
Status: DISCONTINUED | OUTPATIENT
Start: 2018-04-11 | End: 2018-04-11 | Stop reason: HOSPADM

## 2018-04-11 RX ADMIN — STANDARDIZED SENNA CONCENTRATE AND DOCUSATE SODIUM 2 TABLET: 8.6; 5 TABLET, FILM COATED ORAL at 08:56

## 2018-04-11 RX ADMIN — METOCLOPRAMIDE 10 MG: 5 INJECTION, SOLUTION INTRAMUSCULAR; INTRAVENOUS at 12:30

## 2018-04-11 RX ADMIN — INSULIN HUMAN 3 UNITS: 100 INJECTION, SOLUTION PARENTERAL at 06:13

## 2018-04-11 RX ADMIN — Medication 325 MG: at 08:55

## 2018-04-11 RX ADMIN — MORPHINE SULFATE 2 MG: 4 INJECTION INTRAVENOUS at 01:03

## 2018-04-11 RX ADMIN — OXYCODONE HYDROCHLORIDE 5 MG: 5 TABLET ORAL at 08:58

## 2018-04-11 RX ADMIN — ENOXAPARIN SODIUM 40 MG: 100 INJECTION SUBCUTANEOUS at 08:58

## 2018-04-11 RX ADMIN — ATORVASTATIN CALCIUM 20 MG: 20 TABLET, FILM COATED ORAL at 08:55

## 2018-04-11 RX ADMIN — PROCHLORPERAZINE EDISYLATE 10 MG: 5 INJECTION INTRAMUSCULAR; INTRAVENOUS at 08:54

## 2018-04-11 RX ADMIN — INSULIN HUMAN 20 UNITS: 100 INJECTION, SUSPENSION SUBCUTANEOUS at 06:13

## 2018-04-11 ASSESSMENT — ENCOUNTER SYMPTOMS
VOMITING: 1
HEADACHES: 0
NAUSEA: 1
ABDOMINAL PAIN: 0
DIZZINESS: 0
BACK PAIN: 0
DIARRHEA: 0
FEVER: 0
COUGH: 0
LOSS OF CONSCIOUSNESS: 0
SHORTNESS OF BREATH: 0
CHILLS: 0

## 2018-04-11 ASSESSMENT — PAIN SCALES - GENERAL
PAINLEVEL_OUTOF10: 0
PAINLEVEL_OUTOF10: 8
PAINLEVEL_OUTOF10: 8
PAINLEVEL_OUTOF10: 0
PAINLEVEL_OUTOF10: 8
PAINLEVEL_OUTOF10: 0

## 2018-04-11 NOTE — CARE PLAN
Problem: Pain Management  Goal: Pain level will decrease to patient's comfort goal    Intervention: Follow pain managment plan developed in collaboration with patient and Interdisciplinary Team  Pain assessed and documented per unit protocol and appropriate pharmacological and non-pharmacological interventions implemented. Reviewed pain goals with patient and family.        Problem: Communication  Goal: The ability to communicate needs accurately and effectively will improve    Intervention: [place] patient and significant other/support system to call light to alert staff of needs  POC discussed at bedside. Patient verbalizes understanding. Education on medications provided. White board updated, patient encouraged to call for all needs. Calls appropriately. No immediate concerns at this time.

## 2018-04-11 NOTE — DISCHARGE PLANNING
Medical SW    Sw attended AM IDT Rounds.    RN reports, pt can transfer units/floors    Plan: Sw to assist w/ d/c planning as needed.

## 2018-04-11 NOTE — PROGRESS NOTES
[facility] Hospitalist Progress Note    Date of Service: 2018    Chief Complaint  27 y.o. female admitted 2018 with PMHx of TIDM, diabetic gastroparesis.  Admitted with DKA    Interval Problem Update  Feeling better.  This am was unable to keep her breakfast down.  This afternoon ate her lunch slowly and so far has done well.  No other complaints    Consultants/Specialty      Disposition  OK to floor  Dc tomorrow if keeping dinner and breakfast down with reasonable intake        Review of Systems   Constitutional: Negative for chills and fever.   Respiratory: Negative for cough and shortness of breath.    Cardiovascular: Negative for chest pain.   Gastrointestinal: Positive for nausea and vomiting. Negative for abdominal pain and diarrhea.   Musculoskeletal: Negative for back pain.   Skin: Negative for rash.   Neurological: Negative for dizziness, loss of consciousness and headaches.      Physical Exam  Laboratory/Imaging   Hemodynamics  Temp (24hrs), Av.4 °C (97.5 °F), Min:36.3 °C (97.3 °F), Max:36.5 °C (97.7 °F)   Temperature: 36.3 °C (97.3 °F)  Pulse  Av.4  Min: 88  Max: 161 Heart Rate (Monitored): 99  NIBP: 144/106      Respiratory      Respiration: 18, Pulse Oximetry: 96 %        RUL Breath Sounds: Clear, RML Breath Sounds: Clear, RLL Breath Sounds: Clear, NANCY Breath Sounds: Clear, LLL Breath Sounds: Clear    Fluids    Intake/Output Summary (Last 24 hours) at 18 0558  Last data filed at 18 0000   Gross per 24 hour   Intake              400 ml   Output             2000 ml   Net            -1600 ml       Nutrition  Orders Placed This Encounter   Procedures   • DIET ORDER     Standing Status:   Standing     Number of Occurrences:   1     Order Specific Question:   Diet:     Answer:   Diabetic [3]     Physical Exam   Constitutional: She is oriented to person, place, and time. She appears well-developed and well-nourished. No distress.   HENT:   Head: Normocephalic and atraumatic.   Neck:  No JVD present.   Cardiovascular: Normal rate and regular rhythm.    Pulmonary/Chest: Effort normal. No stridor. No respiratory distress. She has no wheezes. She has no rales.   Abdominal: Soft. There is no tenderness. There is no rebound and no guarding.   Musculoskeletal: She exhibits no edema.   Neurological: She is oriented to person, place, and time.   Skin: Skin is warm and dry. No rash noted. She is not diaphoretic.   Psychiatric: She has a normal mood and affect. Thought content normal.   Nursing note and vitals reviewed.      Recent Labs      04/09/18   0317   WBC  8.5   RBC  3.89*   HEMOGLOBIN  11.4*   HEMATOCRIT  33.4*   MCV  85.9   MCH  29.3   MCHC  34.1   RDW  44.0   PLATELETCT  233   MPV  9.5     Recent Labs      04/08/18   1700  04/08/18   1800  04/09/18   0317  04/10/18   0500   SODIUM  142   --   143  142   POTASSIUM  3.4*  3.3*  3.5*  3.4*   CHLORIDE  120*   --   118*  116*   CO2  14*   --   18*  21   GLUCOSE  126*   --   64*  53*   BUN  19   --   13  7*   CREATININE  0.63   --   0.51  0.47*   CALCIUM  7.8*   --   8.4*  8.3*                      Assessment/Plan     * Diabetic ketoacidosis (CMS-HCC)   Assessment & Plan    resolved        Type 1 diabetes mellitus (CMS-HCC)- (present on admission)   Assessment & Plan    Uncontrolled with hyperglycemia  Hba1c 13.3    Transitioned to NPH and sliding scale insulin with good control  Again reviewed compliance issues with her          Gastroparesis due to DM (CMS-HCC)- (present on admission)   Assessment & Plan    Antiemetics as needed  Maintained on reglan TID as outpt          Hypokalemia   Assessment & Plan    Replete and monitor         UTI (urinary tract infection)   Assessment & Plan    Today will be day 3 of Abx        Overweight   Assessment & Plan    Body mass index is 29.9 kg/m².            Quality-Core Measures   Reviewed items::  Labs reviewed, Medications reviewed and Radiology images reviewed  Pitt catheter::  No Pitt  DVT prophylaxis  pharmacological::  Not indicated at this time, ambulatory  DVT prophylaxis - mechanical:  Not indicated at this time, ambulatory  Ulcer Prophylaxis::  Not indicated

## 2018-04-11 NOTE — DISCHARGE INSTRUCTIONS
Discharge Instructions    Discharged to home by car with relative. Discharged via wheelchair, hospital escort: Refused.  Special equipment needed: Not Applicable    Be sure to schedule a follow-up appointment with your primary care doctor or any specialists as instructed.     Discharge Plan:   Diet Plan: Discussed  Activity Level: Discussed  Confirmed Follow up Appointment: Patient to Call and Schedule Appointment  Medication Reconciliation Updated: No (Comments)  Influenza Vaccine Indication: Not indicated: Previously immunized this influenza season and > 8 years of age    I understand that a diet low in cholesterol, fat, and sodium is recommended for good health. Unless I have been given specific instructions below for another diet, I accept this instruction as my diet prescription.   Other diet: Diabetic    Special Instructions: None    · Is patient discharged on Warfarin / Coumadin?   No     Depression / Suicide Risk    As you are discharged from this [facility] facility, it is important to learn how to keep safe from harming yourself.    Recognize the warning signs:  · Abrupt changes in personality, positive or negative- including increase in energy   · Giving away possessions  · Change in eating patterns- significant weight changes-  positive or negative  · Change in sleeping patterns- unable to sleep or sleeping all the time   · Unwillingness or inability to communicate  · Depression  · Unusual sadness, discouragement and loneliness  · Talk of wanting to die  · Neglect of personal appearance   · Rebelliousness- reckless behavior  · Withdrawal from people/activities they love  · Confusion- inability to concentrate     If you or a loved one observes any of these behaviors or has concerns about self-harm, here's what you can do:  · Talk about it- your feelings and reasons for harming yourself  · Remove any means that you might use to hurt yourself (examples: pills, rope, extension cords, firearm)  · Get  professional help from the community (Mental Health, Substance Abuse, psychological counseling)  · Do not be alone:Call your Safe Contact- someone whom you trust who will be there for you.  · Call your local CRISIS HOTLINE 157-8861 or 810-723-9137  · Call your local Children's Mobile Crisis Response Team [place] (622) 710-4523 or www.CinemaWell.com  · Call the toll free National Suicide Prevention Hotlines   · National Suicide Prevention Lifeline 002-179-EYQX (4285)  · [place] [facility] Line Network 800-SUICIDE (321-1127)    Diabetic Ketoacidosis  Diabetic ketoacidosis is a life-threatening complication of diabetes. If it is not treated, it can cause severe dehydration and organ damage and can lead to a coma or death.  What are the causes?  This condition develops when there is not enough of the hormone insulin in the body. Insulin helps the body to break down sugar for energy. Without insulin, the body cannot break down sugar, so it breaks down fats instead. This leads to the production of acids that are called ketones. Ketones are poisonous at high levels.  This condition can be triggered by:  · Stress on the body that is brought on by an illness.  · Medicines that raise blood glucose levels.  · Not taking diabetes medicine.  What are the signs or symptoms?  Symptoms of this condition include:  · Fatigue.  · Weight loss.  · Excessive thirst.  · Light-headedness.  · Fruity or sweet-smelling breath.  · Excessive urination.  · Vision changes.  · Confusion or irritability.  · Nausea.  · Vomiting.  · Rapid breathing.  · Abdominal pain.  · Feeling flushed.  How is this diagnosed?  This condition is diagnosed based on a medical history, a physical exam, and blood tests. You may also have a urine test that checks for ketones.  How is this treated?  This condition may be treated with:  · Fluid replacement. This may be done to correct dehydration.  · Insulin injections. These may be given through the skin or through an  IV tube.  · Electrolyte replacement. Electrolytes, such as potassium and sodium, may be given in pill form or through an IV tube.  · Antibiotic medicines. These may be prescribed if your condition was caused by an infection.  Follow these instructions at home:  Eating and drinking  · Drink enough fluids to keep your urine clear or pale yellow.  · If you cannot eat, alternate between drinking fluids with sugar (such as juice) and salty fluids (such as broth or bouillon).  · If you can eat, follow your usual diet and drink sugar-free liquids, such as water.  Other Instructions   · Take insulin as directed by your health care provider. Do not skip insulin injections. Do not use  insulin.  · If your blood sugar is over 240 mg/dL, monitor your urine ketones every 4-6 hours.  · If you were prescribed an antibiotic medicine, finish all of it even if you start to feel better.  · Rest and exercise only as directed by your health care provider.  · If you get sick, call your health care provider and begin treatment quickly. Your body often needs extra insulin to fight an illness.  · Check your blood glucose levels regularly. If your blood glucose is high, drink plenty of fluids. This helps to flush out ketones.  Contact a health care provider if:  · Your blood glucose level is too high or too low.  · You have ketones in your urine.  · You have a fever.  · You cannot eat.  · You cannot tolerate fluids.  · You have been vomiting for more than 2 hours.  · You continue to have symptoms of this condition.  · You develop new symptoms.  Get help right away if:  · Your blood glucose levels continue to be high (elevated).  · Your monitor reads “high” even when you are taking insulin.  · You faint.  · You have chest pain.  · You have trouble breathing.  · You have a sudden, severe headache.  · You have sudden weakness in one arm or one leg.  · You have sudden trouble speaking or swallowing.  · You have vomiting or diarrhea that  gets worse after 3 hours.  · You feel severely fatigued.  · You have trouble thinking.  · You have abdominal pain.  · You are severely dehydrated. Symptoms of severe dehydration include:  ¨ Extreme thirst.  ¨ Dry mouth.  ¨ Blue lips.  ¨ Cold hands and feet.  ¨ Rapid breathing.  This information is not intended to replace advice given to you by your health care provider. Make sure you discuss any questions you have with your health care provider.  Document Released: 12/15/2001 Document Revised: 05/25/2017 Document Reviewed: 11/25/2015  [employer] Patient Education © 2017 [employer]

## 2018-04-12 NOTE — PROGRESS NOTES
Pt discharged home per MD. Instructions discussed with patient, pt verbalized understanding. Central line RIJ x3 and all PIVs removed.

## 2018-05-24 ENCOUNTER — OFFICE VISIT (OUTPATIENT)
Dept: CARDIOLOGY | Facility: MEDICAL CENTER | Age: 28
End: 2018-05-24
Payer: MEDICAID

## 2018-05-24 VITALS
SYSTOLIC BLOOD PRESSURE: 120 MMHG | HEART RATE: 110 BPM | WEIGHT: 195 LBS | DIASTOLIC BLOOD PRESSURE: 84 MMHG | BODY MASS INDEX: 32.49 KG/M2 | OXYGEN SATURATION: 93 % | RESPIRATION RATE: 14 BRPM | HEIGHT: 65 IN

## 2018-05-24 DIAGNOSIS — I47.11 INAPPROPRIATE SINUS TACHYCARDIA: ICD-10-CM

## 2018-05-24 PROCEDURE — 99204 OFFICE O/P NEW MOD 45 MIN: CPT | Performed by: INTERNAL MEDICINE

## 2018-05-24 RX ORDER — ONDANSETRON 4 MG/1
4 TABLET, ORALLY DISINTEGRATING ORAL EVERY 8 HOURS PRN
COMMUNITY
Start: 2018-04-12 | End: 2018-06-02

## 2018-05-24 NOTE — PROGRESS NOTES
No chief complaint on file.      Subjective:   Alis Sparks is a 27 y.o. female who presents today with a history of diabetes since age 16.  She has had 2 recent hospitalizations for diabetic ketoacidosis.  She was referred for evaluation of apparent inappropriate tachycardia.  Her heart rate was noted to be 120-130 during an outpatient examination recently then dropped to about 100 when she laid down for a short period of time.  She has complications of diabetes which include gastroparesis.  She has had one episode of recent exercise induced shortness of breath chest discomfort as well as dizziness that lasted for a few hours and then resolved spontaneously with no specific treatment.  That was only one time occurrence and has not recurred.  It is also occurred at a time when she was starting back into an exercise program.  She does not have orthopnea, PND, pedal edema.  She does not usually have exercise related chest discomfort and has not had previous myocardial infarction.  She is a non-smoker and does not drink alcohol.  Weight has fluctuated up and down 20 pounds.  She does say that her legs feel tired when she walks times.  EKGs have revealed sinus tachycardia with no significant ST changes and no abnormal Q waves    Past Medical History:   Diagnosis Date   • Backpain    • Bronchitis    • Diabetes type 1, controlled (HCC)     tests 4-5 times daily   • DKA (diabetic ketoacidoses) ([facility])    • Fall    • Gastroparesis    • Kidney infection    • Pneumonia    • UTI (urinary tract infection)      Past Surgical History:   Procedure Laterality Date   • SUBMANDIBLE ABSCESS INCISION AND DRAINAGE Left 11/8/2017    Procedure: SUBMANDIBLE ABSCESS INCISION AND DRAINAGE;  Surgeon: Osman Young M.D.;  Location: SURGERY [facility];  Service: Dental   • DENTAL EXTRACTION(S)  11/8/2017    Procedure: DENTAL EXTRACTION(S);  Surgeon: Osman Young M.D.;  Location: SURGERY [facility];  Service: Dental   •  GASTROSCOPY-ENDO  9/18/2014    Performed by Sylvain PERRY M.D. at ENDOSCOPY [facility]   • GASTROSCOPY WITH BIOPSY  9/18/2014    Performed by Sylvain PERRY M.D. at ENDOSCOPY [facility]   • OTHER      surgery to patch lung after pneumor from central line placement     Family History   Problem Relation Age of Onset   • Cancer       breasts, multiple family members   • Hypertension Maternal Grandfather      Social History     Social History   • Marital status: Single     Spouse name: N/A   • Number of children: N/A   • Years of education: N/A     Occupational History   • Not on file.     Social History Main Topics   • Smoking status: Never Smoker   • Smokeless tobacco: Never Used   • Alcohol use No   • Drug use: No   • Sexual activity: No     Other Topics Concern   • Not on file     Social History Narrative   • No narrative on file     Allergies   Allergen Reactions   • Pcn [Penicillins] Shortness of Breath and Swelling     Per patient's Mom, patient tolerates Keflex   • Lisinopril Unspecified     Per historical: Reported pedal swelling. No facial/angioedema or rash nor respiratory symptoms.    • Promethazine Vomiting     Outpatient Encounter Prescriptions as of 5/24/2018   Medication Sig Dispense Refill   • ondansetron (ZOFRAN ODT) 4 MG TABLET DISPERSIBLE Take 4 mg by mouth as needed.     • insulin glulisine (APIDRA) 100 UNIT/ML Solution Take per home sliding scale three times daily before meals (Patient taking differently: Inject 23 Units as instructed 3 times a day before meals. Take per home sliding scale three times daily before meals) 1 Vial 10   • insulin glargine (BASAGLAR KWIKPEN) 100 UNIT/ML Solution Pen-injector injection Inject 33 Units as instructed 2 times a day.     • insulin glulisine (APIDRA) 100 UNIT/ML Solution Pen-injector injection Inject 23 Units as instructed 3 times a day before meals. Minimum of 23 units per meal with a correction sliding scale, unsure of exact sliding scale     •  "ferrous sulfate 325 (65 Fe) MG tablet Take 325 mg by mouth every day.     • Cholecalciferol 2000 UNIT Cap Take 1 Cap by mouth every day.     • atorvastatin (LIPITOR) 20 MG Tab Take 1 Tab by mouth every day. (Patient taking differently: Take 20 mg by mouth every morning.) 30 Tab 0     No facility-administered encounter medications on file as of 5/24/2018.      ROS.  Her review of systems she was evaluated.  She has complaints of weakness and fatigue which seem to be related to her recent hospitalizations.  She has occasional skin itching and rash.  She has headaches occasionally as well as blurry vision which is not always related to blood sugar.  She has recurrent nausea probably related to her gastroparesis.  She has neck pain and back pain as well as occasional episodes of dizziness when she stands up from a sitting position.  She has some insomnia.  All other responses in the review sheet are negative     Objective:   /84   Pulse (!) 110   Resp 14   Ht 1.651 m (5' 5\")   Wt 88.5 kg (195 lb)   SpO2 93%   BMI 32.45 kg/m²     Physical Exam   Exam:    Vitals:    05/24/18 1252   BP: 120/84   Pulse: (!) 110   Resp: 14   SpO2: 93%   Weight: 88.5 kg (195 lb)   Height: 1.651 m (5' 5\")     Constitutional: Well developed, well nourished, obese young female in no acute distress. Appears approximate stated age and provides a good history.   HEENT: Normocephalic, pupils are equal and responsive. No arcus. Conjunctiva and sclera are clear. No xanthelasma. No diagonal ear lobe crease noted. Mucus membranes are moist and pink. Good oral hygiene  Neck: No JVD or HJR. JVP = 4-5cm H2O. Good carotid upstroke with no bruit. No lymphadenopathy, No thyroid enlargement.  Cardiovascular: Regular rhythm, no ectopics.  S1 greater than S2 at the apex.  No murmur, gallop, rub or click. No lift, heave,  thrill, or cardiomegaly  Lungs: Normal effort, Clear to auscultation and percussion. No rales, rhonchi, wheezing Abdomen: Soft, non " tender, obese. No liver, kidney, spleen or mass palpable. The abdominal aorta is not palpable. Active bowel sounds are noted and there is no bruit  Extremities:  No cyanosis, edema, clubbing. Palpable posterior tibial and dorsal pedal pulses bilaterally.   Skin:   Warm and dry, no active lesions  Neurologic: Alert & oriented.  No lateralizing signs  Psychiatric:  Affect, mood, and judgement are appropriate    Assessment:     1. Inappropriate sinus tachycardia  Echocardiogram Comp w/o Cont    HOLTER MONITOR STUDY       Medical Decision Making:  Today's Assessment / Status / Plan:   Patient appears to have an inappropriate tachycardia with sinus rhythm noted on her hospitalizations.  There have been no EKG changes in the past.  It is likely a sign of autonomic dysfunction due to diabetes and the heart is probably not abnormal.  An echocardiogram will help assess left ventricular function.  If her left ventricle is normal, the cause of the tachycardia is probably autonomic dysfunction.  That is what I would expect from the echo results.  A Holter monitor is also recommended to document heart rate fluctuation during a normal day's activities.  The one episode exercise related chest discomfort associated with some shortness of breath may be the result of ischemia. The patient is still fairly young even with a diagnosis of diabetes.  We will evaluate the echo and Holter monitor and decide at that time whether an ischemic evaluation is indicated .  I discussed with the patient and her mother, who is in attendance, what type of symptoms are likely to be related to cardiac ischemia.  They seem to understand and will return if these exercise related symptoms are recurrent.  If the symptoms are not recurrent and the Holter and the echo are as predicted, the patient will return in about 3 months for repeat evaluation.

## 2018-06-02 ENCOUNTER — APPOINTMENT (OUTPATIENT)
Dept: RADIOLOGY | Facility: MEDICAL CENTER | Age: 28
DRG: 638 | End: 2018-06-02
Attending: EMERGENCY MEDICINE
Payer: MEDICAID

## 2018-06-02 ENCOUNTER — HOSPITAL ENCOUNTER (INPATIENT)
Facility: MEDICAL CENTER | Age: 28
LOS: 9 days | DRG: 638 | End: 2018-06-11
Attending: EMERGENCY MEDICINE | Admitting: HOSPITALIST
Payer: MEDICAID

## 2018-06-02 DIAGNOSIS — E10.10 DIABETIC KETOACIDOSIS WITHOUT COMA ASSOCIATED WITH TYPE 1 DIABETES MELLITUS (HCC): ICD-10-CM

## 2018-06-02 PROBLEM — E83.39 HYPOPHOSPHATEMIA: Status: ACTIVE | Noted: 2018-06-02

## 2018-06-02 PROBLEM — E83.51 HYPOCALCEMIA: Status: ACTIVE | Noted: 2018-06-02

## 2018-06-02 LAB
ALBUMIN SERPL BCP-MCNC: 3.8 G/DL (ref 3.2–4.9)
ALBUMIN SERPL BCP-MCNC: 5.2 G/DL (ref 3.2–4.9)
ALBUMIN/GLOB SERPL: 1.5 G/DL
ALBUMIN/GLOB SERPL: 1.5 G/DL
ALP SERPL-CCNC: 134 U/L (ref 30–99)
ALP SERPL-CCNC: 86 U/L (ref 30–99)
ALT SERPL-CCNC: 13 U/L (ref 2–50)
ALT SERPL-CCNC: 18 U/L (ref 2–50)
ANION GAP SERPL CALC-SCNC: 13 MMOL/L (ref 0–11.9)
ANION GAP SERPL CALC-SCNC: 15 MMOL/L (ref 0–11.9)
ANION GAP SERPL CALC-SCNC: 22 MMOL/L (ref 0–11.9)
ANION GAP SERPL CALC-SCNC: 22 MMOL/L (ref 0–11.9)
ANION GAP SERPL CALC-SCNC: 23 MMOL/L (ref 0–11.9)
AST SERPL-CCNC: 15 U/L (ref 12–45)
AST SERPL-CCNC: 20 U/L (ref 12–45)
B-OH-BUTYR SERPL-MCNC: 2.22 MMOL/L (ref 0.02–0.27)
B-OH-BUTYR SERPL-MCNC: 3.65 MMOL/L (ref 0.02–0.27)
BASE EXCESS BLDV CALC-SCNC: -10 MMOL/L
BASOPHILS # BLD AUTO: 0.5 % (ref 0–1.8)
BASOPHILS # BLD AUTO: 0.5 % (ref 0–1.8)
BASOPHILS # BLD: 0.04 K/UL (ref 0–0.12)
BASOPHILS # BLD: 0.05 K/UL (ref 0–0.12)
BILIRUB SERPL-MCNC: 0.4 MG/DL (ref 0.1–1.5)
BILIRUB SERPL-MCNC: 0.5 MG/DL (ref 0.1–1.5)
BLOOD CULTURE HOLD CXBCH: NORMAL
BODY TEMPERATURE: ABNORMAL CENTIGRADE
BUN SERPL-MCNC: 13 MG/DL (ref 8–22)
BUN SERPL-MCNC: 13 MG/DL (ref 8–22)
BUN SERPL-MCNC: 14 MG/DL (ref 8–22)
BUN SERPL-MCNC: 18 MG/DL (ref 8–22)
BUN SERPL-MCNC: 21 MG/DL (ref 8–22)
CALCIUM SERPL-MCNC: 10.3 MG/DL (ref 8.5–10.5)
CALCIUM SERPL-MCNC: 6.7 MG/DL (ref 8.5–10.5)
CALCIUM SERPL-MCNC: 8.5 MG/DL (ref 8.5–10.5)
CALCIUM SERPL-MCNC: 8.5 MG/DL (ref 8.5–10.5)
CALCIUM SERPL-MCNC: 9.1 MG/DL (ref 8.5–10.5)
CHLORIDE SERPL-SCNC: 108 MMOL/L (ref 96–112)
CHLORIDE SERPL-SCNC: 113 MMOL/L (ref 96–112)
CHLORIDE SERPL-SCNC: 114 MMOL/L (ref 96–112)
CHLORIDE SERPL-SCNC: 116 MMOL/L (ref 96–112)
CHLORIDE SERPL-SCNC: 120 MMOL/L (ref 96–112)
CO2 SERPL-SCNC: 11 MMOL/L (ref 20–33)
CO2 SERPL-SCNC: 16 MMOL/L (ref 20–33)
CO2 SERPL-SCNC: 6 MMOL/L (ref 20–33)
CO2 SERPL-SCNC: 9 MMOL/L (ref 20–33)
CO2 SERPL-SCNC: <5 MMOL/L (ref 20–33)
CREAT SERPL-MCNC: 0.5 MG/DL (ref 0.5–1.4)
CREAT SERPL-MCNC: 0.65 MG/DL (ref 0.5–1.4)
CREAT SERPL-MCNC: 0.67 MG/DL (ref 0.5–1.4)
CREAT SERPL-MCNC: 0.85 MG/DL (ref 0.5–1.4)
CREAT SERPL-MCNC: 1.05 MG/DL (ref 0.5–1.4)
EOSINOPHIL # BLD AUTO: 0.03 K/UL (ref 0–0.51)
EOSINOPHIL # BLD AUTO: 0.04 K/UL (ref 0–0.51)
EOSINOPHIL NFR BLD: 0.3 % (ref 0–6.9)
EOSINOPHIL NFR BLD: 0.4 % (ref 0–6.9)
ERYTHROCYTE [DISTWIDTH] IN BLOOD BY AUTOMATED COUNT: 41.5 FL (ref 35.9–50)
ERYTHROCYTE [DISTWIDTH] IN BLOOD BY AUTOMATED COUNT: 45.1 FL (ref 35.9–50)
GLOBULIN SER CALC-MCNC: 2.6 G/DL (ref 1.9–3.5)
GLOBULIN SER CALC-MCNC: 3.4 G/DL (ref 1.9–3.5)
GLUCOSE BLD-MCNC: 301 MG/DL (ref 65–99)
GLUCOSE BLD-MCNC: 414 MG/DL (ref 65–99)
GLUCOSE BLD-MCNC: 430 MG/DL (ref 65–99)
GLUCOSE BLD-MCNC: 441 MG/DL (ref 65–99)
GLUCOSE BLD-MCNC: 447 MG/DL (ref 65–99)
GLUCOSE BLD-MCNC: 501 MG/DL (ref 65–99)
GLUCOSE BLD-MCNC: 85 MG/DL (ref 65–99)
GLUCOSE BLD-MCNC: 97 MG/DL (ref 65–99)
GLUCOSE SERPL-MCNC: 107 MG/DL (ref 65–99)
GLUCOSE SERPL-MCNC: 240 MG/DL (ref 65–99)
GLUCOSE SERPL-MCNC: 286 MG/DL (ref 65–99)
GLUCOSE SERPL-MCNC: 465 MG/DL (ref 65–99)
GLUCOSE SERPL-MCNC: 529 MG/DL (ref 65–99)
HCG SERPL QL: NEGATIVE
HCO3 BLDV-SCNC: 15 MMOL/L (ref 24–28)
HCT VFR BLD AUTO: 41.1 % (ref 37–47)
HCT VFR BLD AUTO: 49.4 % (ref 37–47)
HGB BLD-MCNC: 13.8 G/DL (ref 12–16)
HGB BLD-MCNC: 17.4 G/DL (ref 12–16)
IMM GRANULOCYTES # BLD AUTO: 0.03 K/UL (ref 0–0.11)
IMM GRANULOCYTES # BLD AUTO: 0.04 K/UL (ref 0–0.11)
IMM GRANULOCYTES NFR BLD AUTO: 0.3 % (ref 0–0.9)
IMM GRANULOCYTES NFR BLD AUTO: 0.5 % (ref 0–0.9)
LIPASE SERPL-CCNC: 10 U/L (ref 11–82)
LYMPHOCYTES # BLD AUTO: 1.68 K/UL (ref 1–4.8)
LYMPHOCYTES # BLD AUTO: 2.01 K/UL (ref 1–4.8)
LYMPHOCYTES NFR BLD: 16.5 % (ref 22–41)
LYMPHOCYTES NFR BLD: 23.4 % (ref 22–41)
MAGNESIUM SERPL-MCNC: 1.8 MG/DL (ref 1.5–2.5)
MCH RBC QN AUTO: 29.6 PG (ref 27–33)
MCH RBC QN AUTO: 29.9 PG (ref 27–33)
MCHC RBC AUTO-ENTMCNC: 33 G/DL (ref 33.6–35)
MCHC RBC AUTO-ENTMCNC: 35.2 G/DL (ref 33.6–35)
MCV RBC AUTO: 84.9 FL (ref 81.4–97.8)
MCV RBC AUTO: 89.7 FL (ref 81.4–97.8)
MONOCYTES # BLD AUTO: 0.43 K/UL (ref 0–0.85)
MONOCYTES # BLD AUTO: 0.64 K/UL (ref 0–0.85)
MONOCYTES NFR BLD AUTO: 5 % (ref 0–13.4)
MONOCYTES NFR BLD AUTO: 6.3 % (ref 0–13.4)
NEUTROPHILS # BLD AUTO: 6.03 K/UL (ref 2–7.15)
NEUTROPHILS # BLD AUTO: 7.74 K/UL (ref 2–7.15)
NEUTROPHILS NFR BLD: 70.3 % (ref 44–72)
NEUTROPHILS NFR BLD: 76 % (ref 44–72)
NRBC # BLD AUTO: 0 K/UL
NRBC # BLD AUTO: 0 K/UL
NRBC BLD-RTO: 0 /100 WBC
NRBC BLD-RTO: 0 /100 WBC
PCO2 BLDV: 32.1 MMHG (ref 41–51)
PH BLDV: 7.3 [PH] (ref 7.31–7.45)
PHOSPHATE SERPL-MCNC: 2.3 MG/DL (ref 2.5–4.5)
PLATELET # BLD AUTO: 200 K/UL (ref 164–446)
PLATELET # BLD AUTO: 310 K/UL (ref 164–446)
PMV BLD AUTO: 10 FL (ref 9–12.9)
PMV BLD AUTO: 10.5 FL (ref 9–12.9)
PO2 BLDV: 43.5 MMHG (ref 25–40)
POTASSIUM SERPL-SCNC: 3.3 MMOL/L (ref 3.6–5.5)
POTASSIUM SERPL-SCNC: 3.7 MMOL/L (ref 3.6–5.5)
POTASSIUM SERPL-SCNC: 3.7 MMOL/L (ref 3.6–5.5)
POTASSIUM SERPL-SCNC: 4.9 MMOL/L (ref 3.6–5.5)
POTASSIUM SERPL-SCNC: 5 MMOL/L (ref 3.6–5.5)
PROT SERPL-MCNC: 6.4 G/DL (ref 6–8.2)
PROT SERPL-MCNC: 8.6 G/DL (ref 6–8.2)
RBC # BLD AUTO: 4.56 M/UL (ref 4.2–5.4)
RBC # BLD AUTO: 5.82 M/UL (ref 4.2–5.4)
SAO2 % BLDV: 81.1 %
SODIUM SERPL-SCNC: 141 MMOL/L (ref 135–145)
SODIUM SERPL-SCNC: 142 MMOL/L (ref 135–145)
SODIUM SERPL-SCNC: 143 MMOL/L (ref 135–145)
SODIUM SERPL-SCNC: 143 MMOL/L (ref 135–145)
SODIUM SERPL-SCNC: 144 MMOL/L (ref 135–145)
WBC # BLD AUTO: 10.2 K/UL (ref 4.8–10.8)
WBC # BLD AUTO: 8.6 K/UL (ref 4.8–10.8)

## 2018-06-02 PROCEDURE — 80048 BASIC METABOLIC PNL TOTAL CA: CPT | Mod: 91

## 2018-06-02 PROCEDURE — 99291 CRITICAL CARE FIRST HOUR: CPT | Performed by: INTERNAL MEDICINE

## 2018-06-02 PROCEDURE — 700105 HCHG RX REV CODE 258: Performed by: EMERGENCY MEDICINE

## 2018-06-02 PROCEDURE — 82803 BLOOD GASES ANY COMBINATION: CPT

## 2018-06-02 PROCEDURE — 83690 ASSAY OF LIPASE: CPT

## 2018-06-02 PROCEDURE — 700111 HCHG RX REV CODE 636 W/ 250 OVERRIDE (IP): Performed by: EMERGENCY MEDICINE

## 2018-06-02 PROCEDURE — 96361 HYDRATE IV INFUSION ADD-ON: CPT

## 2018-06-02 PROCEDURE — 84100 ASSAY OF PHOSPHORUS: CPT

## 2018-06-02 PROCEDURE — 700102 HCHG RX REV CODE 250 W/ 637 OVERRIDE(OP): Performed by: EMERGENCY MEDICINE

## 2018-06-02 PROCEDURE — 80053 COMPREHEN METABOLIC PANEL: CPT

## 2018-06-02 PROCEDURE — 96375 TX/PRO/DX INJ NEW DRUG ADDON: CPT

## 2018-06-02 PROCEDURE — 96376 TX/PRO/DX INJ SAME DRUG ADON: CPT

## 2018-06-02 PROCEDURE — 71045 X-RAY EXAM CHEST 1 VIEW: CPT

## 2018-06-02 PROCEDURE — 96365 THER/PROPH/DIAG IV INF INIT: CPT

## 2018-06-02 PROCEDURE — 84703 CHORIONIC GONADOTROPIN ASSAY: CPT

## 2018-06-02 PROCEDURE — 700105 HCHG RX REV CODE 258: Performed by: HOSPITALIST

## 2018-06-02 PROCEDURE — 99285 EMERGENCY DEPT VISIT HI MDM: CPT

## 2018-06-02 PROCEDURE — A9270 NON-COVERED ITEM OR SERVICE: HCPCS | Performed by: HOSPITALIST

## 2018-06-02 PROCEDURE — 82962 GLUCOSE BLOOD TEST: CPT | Mod: 91

## 2018-06-02 PROCEDURE — 82010 KETONE BODYS QUAN: CPT | Mod: 91

## 2018-06-02 PROCEDURE — 96366 THER/PROPH/DIAG IV INF ADDON: CPT

## 2018-06-02 PROCEDURE — 700111 HCHG RX REV CODE 636 W/ 250 OVERRIDE (IP): Performed by: HOSPITALIST

## 2018-06-02 PROCEDURE — 700111 HCHG RX REV CODE 636 W/ 250 OVERRIDE (IP): Performed by: INTERNAL MEDICINE

## 2018-06-02 PROCEDURE — 85025 COMPLETE CBC W/AUTO DIFF WBC: CPT

## 2018-06-02 PROCEDURE — 770022 HCHG ROOM/CARE - ICU (200)

## 2018-06-02 PROCEDURE — 36415 COLL VENOUS BLD VENIPUNCTURE: CPT

## 2018-06-02 PROCEDURE — 700102 HCHG RX REV CODE 250 W/ 637 OVERRIDE(OP): Performed by: HOSPITALIST

## 2018-06-02 PROCEDURE — 83735 ASSAY OF MAGNESIUM: CPT

## 2018-06-02 PROCEDURE — 700101 HCHG RX REV CODE 250: Performed by: HOSPITALIST

## 2018-06-02 PROCEDURE — 99291 CRITICAL CARE FIRST HOUR: CPT | Performed by: HOSPITALIST

## 2018-06-02 RX ORDER — SODIUM CHLORIDE 450 MG/100ML
INJECTION, SOLUTION INTRAVENOUS CONTINUOUS
Status: DISCONTINUED | OUTPATIENT
Start: 2018-06-02 | End: 2018-06-03

## 2018-06-02 RX ORDER — FERROUS SULFATE 325(65) MG
325 TABLET ORAL DAILY
Status: DISCONTINUED | OUTPATIENT
Start: 2018-06-02 | End: 2018-06-08

## 2018-06-02 RX ORDER — DEXTROSE AND SODIUM CHLORIDE 5; .45 G/100ML; G/100ML
INJECTION, SOLUTION INTRAVENOUS CONTINUOUS
Status: DISCONTINUED | OUTPATIENT
Start: 2018-06-02 | End: 2018-06-03

## 2018-06-02 RX ORDER — ONDANSETRON 2 MG/ML
4 INJECTION INTRAMUSCULAR; INTRAVENOUS EVERY 4 HOURS PRN
Status: DISCONTINUED | OUTPATIENT
Start: 2018-06-02 | End: 2018-06-04

## 2018-06-02 RX ORDER — LABETALOL HYDROCHLORIDE 5 MG/ML
10 INJECTION, SOLUTION INTRAVENOUS EVERY 4 HOURS PRN
Status: DISCONTINUED | OUTPATIENT
Start: 2018-06-02 | End: 2018-06-11 | Stop reason: HOSPADM

## 2018-06-02 RX ORDER — SODIUM CHLORIDE 9 MG/ML
1000 INJECTION, SOLUTION INTRAVENOUS ONCE
Status: COMPLETED | OUTPATIENT
Start: 2018-06-02 | End: 2018-06-02

## 2018-06-02 RX ORDER — SODIUM CHLORIDE 9 MG/ML
2000 INJECTION, SOLUTION INTRAVENOUS ONCE
Status: DISCONTINUED | OUTPATIENT
Start: 2018-06-02 | End: 2018-06-02

## 2018-06-02 RX ORDER — OXYCODONE HYDROCHLORIDE 5 MG/1
5 TABLET ORAL
Status: DISCONTINUED | OUTPATIENT
Start: 2018-06-02 | End: 2018-06-05

## 2018-06-02 RX ORDER — ACETAMINOPHEN 325 MG/1
650 TABLET ORAL EVERY 6 HOURS PRN
Status: DISCONTINUED | OUTPATIENT
Start: 2018-06-02 | End: 2018-06-11 | Stop reason: HOSPADM

## 2018-06-02 RX ORDER — ONDANSETRON 2 MG/ML
4 INJECTION INTRAMUSCULAR; INTRAVENOUS ONCE
Status: COMPLETED | OUTPATIENT
Start: 2018-06-02 | End: 2018-06-02

## 2018-06-02 RX ORDER — SODIUM CHLORIDE, SODIUM LACTATE, POTASSIUM CHLORIDE, CALCIUM CHLORIDE 600; 310; 30; 20 MG/100ML; MG/100ML; MG/100ML; MG/100ML
1000 INJECTION, SOLUTION INTRAVENOUS ONCE
Status: COMPLETED | OUTPATIENT
Start: 2018-06-02 | End: 2018-06-02

## 2018-06-02 RX ORDER — METOCLOPRAMIDE HYDROCHLORIDE 5 MG/5ML
10 SOLUTION ORAL
Status: ON HOLD | COMMUNITY
End: 2019-03-26

## 2018-06-02 RX ORDER — MAGNESIUM SULFATE HEPTAHYDRATE 40 MG/ML
2 INJECTION, SOLUTION INTRAVENOUS
Status: COMPLETED | OUTPATIENT
Start: 2018-06-02 | End: 2018-06-04

## 2018-06-02 RX ORDER — HYDROMORPHONE HYDROCHLORIDE 2 MG/ML
0.25 INJECTION, SOLUTION INTRAMUSCULAR; INTRAVENOUS; SUBCUTANEOUS
Status: DISCONTINUED | OUTPATIENT
Start: 2018-06-02 | End: 2018-06-05

## 2018-06-02 RX ORDER — ATORVASTATIN CALCIUM 20 MG/1
20 TABLET, FILM COATED ORAL
Status: DISCONTINUED | OUTPATIENT
Start: 2018-06-02 | End: 2018-06-11 | Stop reason: HOSPADM

## 2018-06-02 RX ORDER — MAGNESIUM SULFATE HEPTAHYDRATE 40 MG/ML
4 INJECTION, SOLUTION INTRAVENOUS
Status: COMPLETED | OUTPATIENT
Start: 2018-06-02 | End: 2018-06-04

## 2018-06-02 RX ORDER — OXYCODONE HYDROCHLORIDE 5 MG/1
2.5 TABLET ORAL
Status: DISCONTINUED | OUTPATIENT
Start: 2018-06-02 | End: 2018-06-05

## 2018-06-02 RX ORDER — DEXTROSE AND SODIUM CHLORIDE 10; .45 G/100ML; G/100ML
INJECTION, SOLUTION INTRAVENOUS CONTINUOUS
Status: DISCONTINUED | OUTPATIENT
Start: 2018-06-02 | End: 2018-06-03

## 2018-06-02 RX ORDER — INSULIN GLARGINE 100 [IU]/ML
33 INJECTION, SOLUTION SUBCUTANEOUS 2 TIMES DAILY
Status: DISCONTINUED | OUTPATIENT
Start: 2018-06-02 | End: 2018-06-02

## 2018-06-02 RX ORDER — MAGNESIUM SULFATE HEPTAHYDRATE 40 MG/ML
2 INJECTION, SOLUTION INTRAVENOUS ONCE
Status: COMPLETED | OUTPATIENT
Start: 2018-06-02 | End: 2018-06-02

## 2018-06-02 RX ORDER — ONDANSETRON 4 MG/1
4 TABLET, ORALLY DISINTEGRATING ORAL EVERY 4 HOURS PRN
Status: DISCONTINUED | OUTPATIENT
Start: 2018-06-02 | End: 2018-06-11 | Stop reason: HOSPADM

## 2018-06-02 RX ORDER — MORPHINE SULFATE 4 MG/ML
4 INJECTION, SOLUTION INTRAMUSCULAR; INTRAVENOUS ONCE
Status: COMPLETED | OUTPATIENT
Start: 2018-06-02 | End: 2018-06-02

## 2018-06-02 RX ORDER — POTASSIUM CHLORIDE 7.45 MG/ML
10 INJECTION INTRAVENOUS ONCE
Status: COMPLETED | OUTPATIENT
Start: 2018-06-02 | End: 2018-06-03

## 2018-06-02 RX ORDER — AMOXICILLIN 250 MG
2 CAPSULE ORAL 2 TIMES DAILY
Status: DISCONTINUED | OUTPATIENT
Start: 2018-06-02 | End: 2018-06-11 | Stop reason: HOSPADM

## 2018-06-02 RX ORDER — SODIUM CHLORIDE 9 MG/ML
INJECTION, SOLUTION INTRAVENOUS CONTINUOUS
Status: DISCONTINUED | OUTPATIENT
Start: 2018-06-02 | End: 2018-06-02

## 2018-06-02 RX ORDER — BISACODYL 10 MG
10 SUPPOSITORY, RECTAL RECTAL
Status: DISCONTINUED | OUTPATIENT
Start: 2018-06-02 | End: 2018-06-11 | Stop reason: HOSPADM

## 2018-06-02 RX ORDER — POLYETHYLENE GLYCOL 3350 17 G/17G
1 POWDER, FOR SOLUTION ORAL
Status: DISCONTINUED | OUTPATIENT
Start: 2018-06-02 | End: 2018-06-11 | Stop reason: HOSPADM

## 2018-06-02 RX ORDER — METOCLOPRAMIDE 10 MG/1
10 TABLET ORAL
Status: DISCONTINUED | OUTPATIENT
Start: 2018-06-02 | End: 2018-06-03

## 2018-06-02 RX ORDER — DEXTROSE MONOHYDRATE 25 G/50ML
25 INJECTION, SOLUTION INTRAVENOUS
Status: DISCONTINUED | OUTPATIENT
Start: 2018-06-02 | End: 2018-06-04

## 2018-06-02 RX ADMIN — SODIUM CHLORIDE: 4.5 INJECTION, SOLUTION INTRAVENOUS at 18:53

## 2018-06-02 RX ADMIN — SODIUM CHLORIDE 1000 ML: 9 INJECTION, SOLUTION INTRAVENOUS at 12:13

## 2018-06-02 RX ADMIN — HYDROMORPHONE HYDROCHLORIDE 0.25 MG: 2 INJECTION INTRAMUSCULAR; INTRAVENOUS; SUBCUTANEOUS at 22:56

## 2018-06-02 RX ADMIN — MAGNESIUM SULFATE IN WATER 2 G: 40 INJECTION, SOLUTION INTRAVENOUS at 15:58

## 2018-06-02 RX ADMIN — OXYCODONE HYDROCHLORIDE 5 MG: 5 TABLET ORAL at 15:37

## 2018-06-02 RX ADMIN — ENOXAPARIN SODIUM 40 MG: 100 INJECTION SUBCUTANEOUS at 19:58

## 2018-06-02 RX ADMIN — SODIUM CHLORIDE 4 UNITS/HR: 9 INJECTION, SOLUTION INTRAVENOUS at 19:29

## 2018-06-02 RX ADMIN — ONDANSETRON 4 MG: 2 INJECTION INTRAMUSCULAR; INTRAVENOUS at 10:28

## 2018-06-02 RX ADMIN — POTASSIUM PHOSPHATE, MONOBASIC AND POTASSIUM PHOSPHATE, DIBASIC 30 MMOL: 224; 236 INJECTION, SOLUTION INTRAVENOUS at 14:30

## 2018-06-02 RX ADMIN — MORPHINE SULFATE 4 MG: 4 INJECTION INTRAVENOUS at 10:28

## 2018-06-02 RX ADMIN — ONDANSETRON HYDROCHLORIDE 4 MG: 2 INJECTION INTRAMUSCULAR; INTRAVENOUS at 15:42

## 2018-06-02 RX ADMIN — HYDROMORPHONE HYDROCHLORIDE 0.25 MG: 2 INJECTION INTRAMUSCULAR; INTRAVENOUS; SUBCUTANEOUS at 19:58

## 2018-06-02 RX ADMIN — POTASSIUM CHLORIDE 10 MEQ: 10 INJECTION, SOLUTION INTRAVENOUS at 23:36

## 2018-06-02 RX ADMIN — ONDANSETRON 4 MG: 2 INJECTION, SOLUTION INTRAMUSCULAR; INTRAVENOUS at 21:11

## 2018-06-02 RX ADMIN — SODIUM CHLORIDE 1000 ML: 9 INJECTION, SOLUTION INTRAVENOUS at 10:27

## 2018-06-02 RX ADMIN — SODIUM CHLORIDE, POTASSIUM CHLORIDE, SODIUM LACTATE AND CALCIUM CHLORIDE 1000 ML: 600; 310; 30; 20 INJECTION, SOLUTION INTRAVENOUS at 18:49

## 2018-06-02 RX ADMIN — CALCIUM CHLORIDE 1 G: 100 INJECTION, SOLUTION INTRAVENOUS at 18:18

## 2018-06-02 RX ADMIN — ONDANSETRON HYDROCHLORIDE 4 MG: 2 INJECTION INTRAMUSCULAR; INTRAVENOUS at 19:59

## 2018-06-02 RX ADMIN — SODIUM CHLORIDE 1000 ML: 9 INJECTION, SOLUTION INTRAVENOUS at 13:20

## 2018-06-02 RX ADMIN — SODIUM CHLORIDE 1000 ML: 9 INJECTION, SOLUTION INTRAVENOUS at 10:30

## 2018-06-02 RX ADMIN — SODIUM CHLORIDE: 9 INJECTION, SOLUTION INTRAVENOUS at 15:49

## 2018-06-02 ASSESSMENT — PAIN SCALES - GENERAL
PAINLEVEL_OUTOF10: 8
PAINLEVEL_OUTOF10: 8
PAINLEVEL_OUTOF10: 4
PAINLEVEL_OUTOF10: 8
PAINLEVEL_OUTOF10: 8

## 2018-06-02 ASSESSMENT — ENCOUNTER SYMPTOMS
NAUSEA: 1
COUGH: 0
ABDOMINAL PAIN: 1
NERVOUS/ANXIOUS: 0
DIZZINESS: 1
PHOTOPHOBIA: 0
SENSORY CHANGE: 0
BLOOD IN STOOL: 0
PALPITATIONS: 0
BACK PAIN: 0
DIARRHEA: 0
SORE THROAT: 0
SHORTNESS OF BREATH: 0
CHILLS: 0
VOMITING: 1
DIAPHORESIS: 1
NECK PAIN: 0
WEAKNESS: 1
FEVER: 0
SEIZURES: 0
DOUBLE VISION: 0
BLURRED VISION: 0
FOCAL WEAKNESS: 0
DEPRESSION: 0
HEADACHES: 0
HEARTBURN: 1

## 2018-06-02 ASSESSMENT — LIFESTYLE VARIABLES
SUBSTANCE_ABUSE: 0
EVER_SMOKED: NEVER

## 2018-06-02 NOTE — ED NOTES
Pt brought back from lobby via wheelchair, able to ambulate from wheelchair to gurney independently with steady gait.     Pt c/o nausea/vomiting that started yesterday along with sharp, diffuse abdominal pain and R side pain. Pt states diarrhea two days ago, now denies. Denies urinary symptoms. Pt has hx of DKA, states she feels like she is in DKA again. Pt states she took 8 units of regular and 33 units of long lasting insulin PTA. BS in triage 312.     Pt a/o x4, speaking in full sentences.

## 2018-06-02 NOTE — PROGRESS NOTES
Pt arrived to unit via gurney at 1445. Pt oriented to room, unit, and plan of care. Tele-monitor placed. Pt complains of 8/10 abdominal pain, will medicate once medications verified by pharmacy, pt verbalizes understanding. All questions answered at this time. Call light within reach, fall precautions in place, will continue to monitor.

## 2018-06-02 NOTE — PROGRESS NOTES
Assessment completed. Pt A&O x 4. Respirations are even and unlabored on room air. Neuro intact. +BS. POC updated. Pt educated on room and call light. Pt verbalizes understanding. Whiteboard updated. Monitors applied, sinus tachycardia, call light and belongings within reach. Needs met, will continue to monitor.

## 2018-06-02 NOTE — ED NOTES
[place] fall assessment completed. No fall risk based off of assessment. Bed locked in low position, call light in place. Personal possessions in place.

## 2018-06-02 NOTE — PROGRESS NOTES
Pt was unable to tolerate her full liquid diet.  Pt vomited around 200ml of clear liquid.  Medicated for N/V and pain.  Will continue to closely monitor. Call light within reach.

## 2018-06-02 NOTE — ED TRIAGE NOTES
Wheeled to triage  Chief Complaint   Patient presents with   • High Blood Sugar     elevated blood sugars, n/v x1 week     FS this morning at home was 312, took her sliding scale insulin, has not eaten, FS in triage 301.  Pt feels like she's in DKA.

## 2018-06-02 NOTE — ED PROVIDER NOTES
ED Provider Note    Scribed for Charles José M.D. by Divya Crain. 6/2/2018, 10:17 AM.    Primary care provider: Navi Huber M.D.  Means of arrival: Wheel Chair  History obtained from: Patient  History limited by: None    CHIEF COMPLAINT  Chief Complaint   Patient presents with   • High Blood Sugar     elevated blood sugars, n/v x1 week     HPI  Alis Sparks is a 27 y.o. female who presents to the Emergency Department with elevated blood sugar onset 1 week. Associated symptoms include right sided abdominal pain, chills, nausea, and vomiting onset yesterday. Additionally, patient reports diarrhea 2 days ago. Patient has had DKA in the past and reports her symptoms today feel similar. Patient confirms symptoms of abdominal pain and chills with DKA in the past. Denies any recent fever, infection, cough or dysuria that could have caused her symptoms. Denies any abdominal surgeries. LMP was 2 days ago. Denies chance of pregnancy. Patient sees Dr. Huber at [facility]. Is followed by a cardiologist due to an elevated heart rate and is scheduled for an echocardiogram on Thursday.     REVIEW OF SYSTEMS - C  Pertinent positives include DKA, elevated blood sugar, nausea, vomiting, chills, abomdinal pain. Pertinent negatives include no fever, dysuria, chance of pregnancy, infection, cough. As above, all other systems reviewed and are negative.   See HPI for further details.     PAST MEDICAL HISTORY   has a past medical history of Backpain; Bronchitis; Diabetes type 1, controlled ([facility]); DKA (diabetic ketoacidoses) ([facility]); Fall; Gastroparesis; Kidney infection; Pneumonia; and UTI (urinary tract infection).    SURGICAL HISTORY   has a past surgical history that includes gastroscopy-endo (9/18/2014); gastroscopy with biopsy (9/18/2014); other; submandible abscess incision and drainage (Left, 11/8/2017); and dental extraction(s) (11/8/2017).    SOCIAL HISTORY  Social History   Substance Use  Topics   • Smoking status: Never Smoker   • Smokeless tobacco: Never Used   • Alcohol use No      History   Drug Use No       FAMILY HISTORY  Family History   Problem Relation Age of Onset   • Cancer       breasts, multiple family members   • Hypertension Maternal Grandfather        CURRENT MEDICATIONS  No current facility-administered medications on file prior to encounter.      Current Outpatient Prescriptions on File Prior to Encounter   Medication Sig Dispense Refill   • insulin glulisine (APIDRA) 100 UNIT/ML Solution Take per home sliding scale three times daily before meals (Patient taking differently: Inject 20-30 Units as instructed 3 times a day before meals. Take per home sliding scale three times daily before meals) 1 Vial 10   • insulin glargine (BASAGLAR KWIKPEN) 100 UNIT/ML Solution Pen-injector injection Inject 33 Units as instructed 2 times a day.     • ferrous sulfate 325 (65 Fe) MG tablet Take 325 mg by mouth every day.     • Cholecalciferol 2000 UNIT Cap Take 1 Cap by mouth every day.     • atorvastatin (LIPITOR) 20 MG Tab Take 1 Tab by mouth every day. 30 Tab 0       ALLERGIES  Allergies   Allergen Reactions   • Pcn [Penicillins] Shortness of Breath and Swelling     Per patient's Mom, patient tolerates Keflex   • Lisinopril Unspecified     Per historical: Reported pedal swelling. No facial/angioedema or rash nor respiratory symptoms.    • Promethazine Vomiting       PHYSICAL EXAM  VITAL SIGNS: /81   Pulse (!) 160   Temp 36.1 °C (96.9 °F)   Resp 20   Wt 82.7 kg (182 lb 5.1 oz)   SpO2 97%   BMI 30.34 kg/m²   Vitals reviewed.    Constitutional: Appears generally and hydrated.   HENT: No signs of trauma, Bilateral external ears normal, Nose normal.   Eyes: Pupils are equal and reactive, Conjunctiva normal, Non-icteric.   Neck: Normal range of motion, No tenderness, Supple, No stridor.   Lymphatic: No lymphadenopathy noted.   Cardiovascular: Tachycardic. Regular rhythm, no murmurs.    Thorax & Lungs: Normal breath sounds, No respiratory distress, No wheezing, No chest tenderness.   Abdomen: Bowel sounds normal, Soft, No tenderness, No peritoneal signs, No masses, No pulsatile masses.   Skin: Warm, Dry, No erythema, No rash.   Back: No bony tenderness, No CVA tenderness.   Extremities: Intact distal pulses, No edema, No tenderness, No cyanosis  Musculoskeletal: Good range of motion in all major joints. No tenderness to palpation or major deformities noted.   Neurologic: Alert , Normal motor function, Normal sensory function, No focal deficits noted.   Psychiatric: Affect normal, Judgment normal, Mood normal.     DIAGNOSTIC STUDIES / PROCEDURES    LABS  Labs Reviewed   CBC WITH DIFFERENTIAL - Abnormal; Notable for the following:        Result Value    RBC 5.82 (*)     Hemoglobin 17.4 (*)     Hematocrit 49.4 (*)     MCHC 35.2 (*)     Neutrophils-Polys 76.00 (*)     Lymphocytes 16.50 (*)     Neutrophils (Absolute) 7.74 (*)     All other components within normal limits   COMP METABOLIC PANEL - Abnormal; Notable for the following:     Co2 11 (*)     Anion Gap 22.0 (*)     Glucose 240 (*)     Alkaline Phosphatase 134 (*)     Albumin 5.2 (*)     Total Protein 8.6 (*)     All other components within normal limits   PHOSPHORUS - Abnormal; Notable for the following:     Phosphorus 2.3 (*)     All other components within normal limits   BETA-HYDROXYBUTYRIC ACID - Abnormal; Notable for the following:     beta-Hydroxybutyric Acid 3.65 (*)     All other components within normal limits   LIPASE - Abnormal; Notable for the following:     Lipase 10 (*)     All other components within normal limits   VENOUS BLOOD GAS - Abnormal; Notable for the following:     Venous Bg Ph 7.30 (*)     Venous Bg Pco2 32.1 (*)     Venous Bg Po2 43.5 (*)     Venous Bg Hco3 15 (*)     All other components within normal limits   BASIC METABOLIC PANEL - Abnormal; Notable for the following:     Potassium 3.3 (*)     Chloride 114 (*)      Co2 16 (*)     Glucose 107 (*)     Anion Gap 13.0 (*)     All other components within normal limits   BETA-HYDROXYBUTYRIC ACID - Abnormal; Notable for the following:     beta-Hydroxybutyric Acid 2.22 (*)     All other components within normal limits   ACCU-CHEK GLUCOSE - Abnormal; Notable for the following:     Glucose - Accu-Ck 301 (*)     All other components within normal limits   MAGNESIUM   HCG QUAL SERUM   ESTIMATED GFR   BLOOD CULTURE,HOLD   ESTIMATED GFR   URINALYSIS,CULTURE IF INDICATED   ACCU-CHEK GLUCOSE   ACCU-CHEK GLUCOSE      All labs reviewed by me.    RADIOLOGY  DX-CHEST-PORTABLE (1 VIEW)   Final Result      No acute cardiac or pulmonary abnormalities are identified.        The radiologist's interpretation of all radiological studies have been reviewed by me.    COURSE & MEDICAL DECISION MAKING  Nursing notes, VS, PMSFHx reviewed in chart.  Differential diagnoses include but not limited to: dehydration, DKA, pneumonia, UTI, sepsis.    Obtained and reviewed past medical records from March 4th, 2018 at which time patient presented with similar symptoms. She had a negative CT scan of the abdomen at that time. She has had three abdominal CT's within the last 2 years. At this time will treat her for DKA and not expose her to more radiation.     10:17 AM - Patient seen and examined at bedside. Discussed the treatment care plan with the patient which is to check the urine to rule out UTI and pneumonia. Ordered for DX-chest, HCG qual serum, magnesium, phosphorus, beta-hydroxybutyric acid, lipase, venous blood gas, CBC with differential, CMP, urinalysis, accu-chek glucose to evaluate. Patient will be treated with IV fluids (see statement below), morphine injection 4 mg, and Zofran 4 mg for her symptoms.      The patient will be resuscitated with 2L NS IV. Her heart rate is 140 and she appears dehydrated. Response to IV fluids was now decrease heart rate to 110, good response.     11:43 AM - Paged Dr. Mantilla  Jermain ([facility]).    11:44 AM - Paged Dr. Lyons ([facility]) to admit patient.     11:54 AM - Consulted with Andrew Aly ([facility]) who would like me to repeat her serum ketones and BMP to see if they have improved since she has received 2 liters of fluids. He also would like me to give her 5 units of IV insulin. If those are improved she can to to the floor of the ICU.    Patient had repeat finger stick glucose 97 and insulin held. Ordered and additional 3rd liter of NS iV.    1:00 PM - I have reviewed the labs. Patient's CO2 and serum ketones have improved.     1:16 PM - Consulted with Dr. Calzada (Hospitalist) who will admit the patient.     CRITICAL CARE  The very real possibility of a deterioration of this patient's condition required the highest level of my preparedness for sudden, emergent intervention.  I provided critical care services, which included medication orders, frequent reevaluations of the patient's condition and response to treatment, ordering and reviewing test results, and discussing the case with various consultants.  The critical care time associated with the care of the patient was 40 minutes. Review chart for interventions. This time is exclusive of any other billable procedures.     DISPOSITION:  Patient will be admitted to Dr. Calzada (Hospitalist) in poor condition.      FINAL IMPRESSION    1. Diabetic ketoacidosis without coma associated with type 1 diabetes mellitus (HCC)       Total critical care time of 40 minutes, as outlined above       Divya BRAMBILA (Annia), am scribing for, and in the presence of, Charles José M.D..    Electronically signed by: Divya Rutherford), 6/2/2018    Charles BRAMBILA M.D. personally performed the services described in this documentation, as scribed by Divya Crain in my presence, and it is both accurate and complete.    The note accurately reflects work and decisions made by me.  Charles José  6/2/2018  1:54 PM

## 2018-06-02 NOTE — DISCHARGE PLANNING
Care Transition Team Assessment    Patient lives with her parents, Sharona is mom.  ([phone]).  States that she has an advance directive but none on file.  Patient goes to the [facility].  No needs anticipated at this time.        Information Source  Orientation : Oriented x 4  Information Given By: Patient  Informant's Name:  (Alis Sparks)  Who is responsible for making decisions for patient? : Patient    Readmission Evaluation  Is this a readmission?: No    Elopement Risk  Legal Hold: No  Ambulatory or Self Mobile in Wheelchair: No-Not an Elopement Risk    Interdisciplinary Discharge Planning  Does Admitting Nurse Feel This Could be a Complex Discharge?: No  Primary Care Physician:  (Navi Huber at the [facility])  Lives with - Patient's Self Care Capacity: Parents  Support Systems: Parent  Housing / Facility: [address]  Do You Take your Prescribed Medications Regularly: Yes  Able to Return to Previous ADL's: Yes  Mobility Issues: Yes  Prior Services: None  Patient Expects to be Discharged to::  (Home)  Assistance Needed: No  Durable Medical Equipment: Home Oxygen (in the past)  DME Provider / Phone:  (Unsure)    Discharge Preparedness  What is your plan after discharge?: Home with help  What are your discharge supports?: Parent  Prior Functional Level: Ambulatory  Difficulity with ADLs: None  Difficulity with IADLs: None    Functional Assesment  Prior Functional Level: Ambulatory    Finances  Financial Barriers to Discharge: No  Prescription Coverage: Yes    Advance Directive  Advance Directive?: Living Will, DPOA for Health Care    Domestic Abuse  Have you ever been the victim of abuse or violence?: No  Physical Abuse or Sexual Abuse: No  Verbal Abuse or Emotional Abuse: No    Psychological Assessment  History of Substance Abuse: Other (comment) (in the past)  Substance Abuse Comments:  (yes, in the past (ove 4 years ago))    Discharge Risks or Barriers  Discharge risks or barriers?:  No    Anticipated Discharge Information  Anticipated discharge disposition: Home  Discharge Address:  ([address])  Discharge Contact Phone Number:  ([phone])

## 2018-06-03 ENCOUNTER — APPOINTMENT (OUTPATIENT)
Dept: RADIOLOGY | Facility: MEDICAL CENTER | Age: 28
DRG: 638 | End: 2018-06-03
Attending: INTERNAL MEDICINE
Payer: MEDICAID

## 2018-06-03 LAB
ANION GAP SERPL CALC-SCNC: 12 MMOL/L (ref 0–11.9)
ANION GAP SERPL CALC-SCNC: 23 MMOL/L (ref 0–11.9)
ANION GAP SERPL CALC-SCNC: 25 MMOL/L (ref 0–11.9)
ANION GAP SERPL CALC-SCNC: 6 MMOL/L (ref 0–11.9)
ANION GAP SERPL CALC-SCNC: 7 MMOL/L (ref 0–11.9)
ANION GAP SERPL CALC-SCNC: 8 MMOL/L (ref 0–11.9)
ANION GAP SERPL CALC-SCNC: 9 MMOL/L (ref 0–11.9)
BASOPHILS # BLD AUTO: 0.1 % (ref 0–1.8)
BASOPHILS # BLD: 0.02 K/UL (ref 0–0.12)
BUN SERPL-MCNC: 12 MG/DL (ref 8–22)
BUN SERPL-MCNC: 13 MG/DL (ref 8–22)
BUN SERPL-MCNC: 8 MG/DL (ref 8–22)
BUN SERPL-MCNC: 9 MG/DL (ref 8–22)
CALCIUM SERPL-MCNC: 8.4 MG/DL (ref 8.5–10.5)
CALCIUM SERPL-MCNC: 8.6 MG/DL (ref 8.5–10.5)
CALCIUM SERPL-MCNC: 8.7 MG/DL (ref 8.5–10.5)
CALCIUM SERPL-MCNC: 8.7 MG/DL (ref 8.5–10.5)
CALCIUM SERPL-MCNC: 8.8 MG/DL (ref 8.5–10.5)
CALCIUM SERPL-MCNC: 9 MG/DL (ref 8.5–10.5)
CALCIUM SERPL-MCNC: 9 MG/DL (ref 8.5–10.5)
CHLORIDE SERPL-SCNC: 116 MMOL/L (ref 96–112)
CHLORIDE SERPL-SCNC: 117 MMOL/L (ref 96–112)
CHLORIDE SERPL-SCNC: 117 MMOL/L (ref 96–112)
CHLORIDE SERPL-SCNC: 119 MMOL/L (ref 96–112)
CHLORIDE SERPL-SCNC: 120 MMOL/L (ref 96–112)
CHLORIDE SERPL-SCNC: 121 MMOL/L (ref 96–112)
CHLORIDE SERPL-SCNC: 122 MMOL/L (ref 96–112)
CO2 SERPL-SCNC: 13 MMOL/L (ref 20–33)
CO2 SERPL-SCNC: 15 MMOL/L (ref 20–33)
CO2 SERPL-SCNC: 16 MMOL/L (ref 20–33)
CO2 SERPL-SCNC: 16 MMOL/L (ref 20–33)
CO2 SERPL-SCNC: 17 MMOL/L (ref 20–33)
CO2 SERPL-SCNC: 5 MMOL/L (ref 20–33)
CO2 SERPL-SCNC: 6 MMOL/L (ref 20–33)
CREAT SERPL-MCNC: 0.5 MG/DL (ref 0.5–1.4)
CREAT SERPL-MCNC: 0.57 MG/DL (ref 0.5–1.4)
CREAT SERPL-MCNC: 0.63 MG/DL (ref 0.5–1.4)
CREAT SERPL-MCNC: 0.68 MG/DL (ref 0.5–1.4)
CREAT SERPL-MCNC: 0.83 MG/DL (ref 0.5–1.4)
CREAT SERPL-MCNC: 0.98 MG/DL (ref 0.5–1.4)
CREAT SERPL-MCNC: 1.07 MG/DL (ref 0.5–1.4)
EOSINOPHIL # BLD AUTO: 0 K/UL (ref 0–0.51)
EOSINOPHIL NFR BLD: 0 % (ref 0–6.9)
ERYTHROCYTE [DISTWIDTH] IN BLOOD BY AUTOMATED COUNT: 46.9 FL (ref 35.9–50)
GLUCOSE BLD-MCNC: 102 MG/DL (ref 65–99)
GLUCOSE BLD-MCNC: 109 MG/DL (ref 65–99)
GLUCOSE BLD-MCNC: 109 MG/DL (ref 65–99)
GLUCOSE BLD-MCNC: 114 MG/DL (ref 65–99)
GLUCOSE BLD-MCNC: 117 MG/DL (ref 65–99)
GLUCOSE BLD-MCNC: 119 MG/DL (ref 65–99)
GLUCOSE BLD-MCNC: 119 MG/DL (ref 65–99)
GLUCOSE BLD-MCNC: 120 MG/DL (ref 65–99)
GLUCOSE BLD-MCNC: 150 MG/DL (ref 65–99)
GLUCOSE BLD-MCNC: 156 MG/DL (ref 65–99)
GLUCOSE BLD-MCNC: 161 MG/DL (ref 65–99)
GLUCOSE BLD-MCNC: 163 MG/DL (ref 65–99)
GLUCOSE BLD-MCNC: 175 MG/DL (ref 65–99)
GLUCOSE BLD-MCNC: 232 MG/DL (ref 65–99)
GLUCOSE BLD-MCNC: 244 MG/DL (ref 65–99)
GLUCOSE BLD-MCNC: 286 MG/DL (ref 65–99)
GLUCOSE BLD-MCNC: 362 MG/DL (ref 65–99)
GLUCOSE BLD-MCNC: 367 MG/DL (ref 65–99)
GLUCOSE BLD-MCNC: 384 MG/DL (ref 65–99)
GLUCOSE BLD-MCNC: 82 MG/DL (ref 65–99)
GLUCOSE BLD-MCNC: 87 MG/DL (ref 65–99)
GLUCOSE BLD-MCNC: 94 MG/DL (ref 65–99)
GLUCOSE BLD-MCNC: 98 MG/DL (ref 65–99)
GLUCOSE SERPL-MCNC: 103 MG/DL (ref 65–99)
GLUCOSE SERPL-MCNC: 123 MG/DL (ref 65–99)
GLUCOSE SERPL-MCNC: 155 MG/DL (ref 65–99)
GLUCOSE SERPL-MCNC: 159 MG/DL (ref 65–99)
GLUCOSE SERPL-MCNC: 173 MG/DL (ref 65–99)
GLUCOSE SERPL-MCNC: 309 MG/DL (ref 65–99)
GLUCOSE SERPL-MCNC: 378 MG/DL (ref 65–99)
HCT VFR BLD AUTO: 42.1 % (ref 37–47)
HGB BLD-MCNC: 13.9 G/DL (ref 12–16)
IMM GRANULOCYTES # BLD AUTO: 0.12 K/UL (ref 0–0.11)
IMM GRANULOCYTES NFR BLD AUTO: 0.9 % (ref 0–0.9)
LACTATE BLD-SCNC: 2.6 MMOL/L (ref 0.5–2)
LACTATE BLD-SCNC: 4.3 MMOL/L (ref 0.5–2)
LYMPHOCYTES # BLD AUTO: 1.07 K/UL (ref 1–4.8)
LYMPHOCYTES NFR BLD: 7.7 % (ref 22–41)
MAGNESIUM SERPL-MCNC: 1.5 MG/DL (ref 1.5–2.5)
MAGNESIUM SERPL-MCNC: 2.1 MG/DL (ref 1.5–2.5)
MCH RBC QN AUTO: 29.8 PG (ref 27–33)
MCHC RBC AUTO-ENTMCNC: 33 G/DL (ref 33.6–35)
MCV RBC AUTO: 90.3 FL (ref 81.4–97.8)
MONOCYTES # BLD AUTO: 0.73 K/UL (ref 0–0.85)
MONOCYTES NFR BLD AUTO: 5.3 % (ref 0–13.4)
NEUTROPHILS # BLD AUTO: 11.94 K/UL (ref 2–7.15)
NEUTROPHILS NFR BLD: 86 % (ref 44–72)
NRBC # BLD AUTO: 0 K/UL
NRBC BLD-RTO: 0 /100 WBC
PHOSPHATE SERPL-MCNC: 4.5 MG/DL (ref 2.5–4.5)
PLATELET # BLD AUTO: 234 K/UL (ref 164–446)
PMV BLD AUTO: 10.6 FL (ref 9–12.9)
POTASSIUM SERPL-SCNC: 3.7 MMOL/L (ref 3.6–5.5)
POTASSIUM SERPL-SCNC: 3.7 MMOL/L (ref 3.6–5.5)
POTASSIUM SERPL-SCNC: 3.8 MMOL/L (ref 3.6–5.5)
POTASSIUM SERPL-SCNC: 4 MMOL/L (ref 3.6–5.5)
POTASSIUM SERPL-SCNC: 4.1 MMOL/L (ref 3.6–5.5)
POTASSIUM SERPL-SCNC: 4.8 MMOL/L (ref 3.6–5.5)
POTASSIUM SERPL-SCNC: 5.1 MMOL/L (ref 3.6–5.5)
RBC # BLD AUTO: 4.66 M/UL (ref 4.2–5.4)
SODIUM SERPL-SCNC: 139 MMOL/L (ref 135–145)
SODIUM SERPL-SCNC: 143 MMOL/L (ref 135–145)
SODIUM SERPL-SCNC: 143 MMOL/L (ref 135–145)
SODIUM SERPL-SCNC: 146 MMOL/L (ref 135–145)
SODIUM SERPL-SCNC: 147 MMOL/L (ref 135–145)
WBC # BLD AUTO: 13.9 K/UL (ref 4.8–10.8)

## 2018-06-03 PROCEDURE — 84100 ASSAY OF PHOSPHORUS: CPT

## 2018-06-03 PROCEDURE — 80048 BASIC METABOLIC PNL TOTAL CA: CPT

## 2018-06-03 PROCEDURE — 770022 HCHG ROOM/CARE - ICU (200)

## 2018-06-03 PROCEDURE — 700117 HCHG RX CONTRAST REV CODE 255: Performed by: INTERNAL MEDICINE

## 2018-06-03 PROCEDURE — 700111 HCHG RX REV CODE 636 W/ 250 OVERRIDE (IP): Performed by: INTERNAL MEDICINE

## 2018-06-03 PROCEDURE — 99233 SBSQ HOSP IP/OBS HIGH 50: CPT | Performed by: HOSPITALIST

## 2018-06-03 PROCEDURE — 83605 ASSAY OF LACTIC ACID: CPT

## 2018-06-03 PROCEDURE — 99291 CRITICAL CARE FIRST HOUR: CPT | Performed by: INTERNAL MEDICINE

## 2018-06-03 PROCEDURE — 700111 HCHG RX REV CODE 636 W/ 250 OVERRIDE (IP): Performed by: HOSPITALIST

## 2018-06-03 PROCEDURE — 82962 GLUCOSE BLOOD TEST: CPT | Mod: 91

## 2018-06-03 PROCEDURE — 700105 HCHG RX REV CODE 258: Performed by: HOSPITALIST

## 2018-06-03 PROCEDURE — 700105 HCHG RX REV CODE 258: Performed by: INTERNAL MEDICINE

## 2018-06-03 PROCEDURE — 74177 CT ABD & PELVIS W/CONTRAST: CPT

## 2018-06-03 PROCEDURE — 700101 HCHG RX REV CODE 250: Performed by: INTERNAL MEDICINE

## 2018-06-03 PROCEDURE — 83735 ASSAY OF MAGNESIUM: CPT

## 2018-06-03 PROCEDURE — 700102 HCHG RX REV CODE 250 W/ 637 OVERRIDE(OP): Performed by: HOSPITALIST

## 2018-06-03 PROCEDURE — 85025 COMPLETE CBC W/AUTO DIFF WBC: CPT

## 2018-06-03 PROCEDURE — 700101 HCHG RX REV CODE 250: Performed by: HOSPITALIST

## 2018-06-03 RX ORDER — POTASSIUM CHLORIDE 7.45 MG/ML
10 INJECTION INTRAVENOUS
Status: COMPLETED | OUTPATIENT
Start: 2018-06-04 | End: 2018-06-04

## 2018-06-03 RX ORDER — POTASSIUM CHLORIDE 7.45 MG/ML
10 INJECTION INTRAVENOUS ONCE
Status: COMPLETED | OUTPATIENT
Start: 2018-06-03 | End: 2018-06-03

## 2018-06-03 RX ORDER — DEXTROSE MONOHYDRATE 25 G/50ML
50 INJECTION, SOLUTION INTRAVENOUS ONCE
Status: COMPLETED | OUTPATIENT
Start: 2018-06-03 | End: 2018-06-03

## 2018-06-03 RX ORDER — SODIUM CHLORIDE, SODIUM LACTATE, POTASSIUM CHLORIDE, CALCIUM CHLORIDE 600; 310; 30; 20 MG/100ML; MG/100ML; MG/100ML; MG/100ML
2000 INJECTION, SOLUTION INTRAVENOUS CONTINUOUS
Status: DISCONTINUED | OUTPATIENT
Start: 2018-06-03 | End: 2018-06-04

## 2018-06-03 RX ORDER — DEXTROSE MONOHYDRATE 100 MG/ML
INJECTION, SOLUTION INTRAVENOUS CONTINUOUS
Status: ACTIVE | OUTPATIENT
Start: 2018-06-03 | End: 2018-06-04

## 2018-06-03 RX ORDER — METOCLOPRAMIDE HYDROCHLORIDE 5 MG/ML
10 INJECTION INTRAMUSCULAR; INTRAVENOUS EVERY 6 HOURS
Status: DISCONTINUED | OUTPATIENT
Start: 2018-06-03 | End: 2018-06-08

## 2018-06-03 RX ORDER — SODIUM CHLORIDE, SODIUM LACTATE, POTASSIUM CHLORIDE, CALCIUM CHLORIDE 600; 310; 30; 20 MG/100ML; MG/100ML; MG/100ML; MG/100ML
2000 INJECTION, SOLUTION INTRAVENOUS ONCE
Status: COMPLETED | OUTPATIENT
Start: 2018-06-03 | End: 2018-06-03

## 2018-06-03 RX ADMIN — METOCLOPRAMIDE 10 MG: 5 INJECTION, SOLUTION INTRAMUSCULAR; INTRAVENOUS at 09:15

## 2018-06-03 RX ADMIN — POTASSIUM CHLORIDE 10 MEQ: 10 INJECTION, SOLUTION INTRAVENOUS at 11:27

## 2018-06-03 RX ADMIN — SODIUM CHLORIDE, POTASSIUM CHLORIDE, SODIUM LACTATE AND CALCIUM CHLORIDE 2000 ML: 600; 310; 30; 20 INJECTION, SOLUTION INTRAVENOUS at 01:42

## 2018-06-03 RX ADMIN — DEXTROSE MONOHYDRATE: 100 INJECTION, SOLUTION INTRAVENOUS at 22:18

## 2018-06-03 RX ADMIN — DEXTROSE AND SODIUM CHLORIDE: 5; 450 INJECTION, SOLUTION INTRAVENOUS at 04:03

## 2018-06-03 RX ADMIN — SODIUM CHLORIDE 14 UNITS/HR: 9 INJECTION, SOLUTION INTRAVENOUS at 08:07

## 2018-06-03 RX ADMIN — POTASSIUM CHLORIDE 10 MEQ: 10 INJECTION, SOLUTION INTRAVENOUS at 11:26

## 2018-06-03 RX ADMIN — POTASSIUM CHLORIDE 10 MEQ: 10 INJECTION, SOLUTION INTRAVENOUS at 03:17

## 2018-06-03 RX ADMIN — ENOXAPARIN SODIUM 40 MG: 100 INJECTION SUBCUTANEOUS at 07:52

## 2018-06-03 RX ADMIN — ONDANSETRON HYDROCHLORIDE 4 MG: 2 INJECTION INTRAMUSCULAR; INTRAVENOUS at 06:07

## 2018-06-03 RX ADMIN — SODIUM CHLORIDE, POTASSIUM CHLORIDE, SODIUM LACTATE AND CALCIUM CHLORIDE 2000 ML: 600; 310; 30; 20 INJECTION, SOLUTION INTRAVENOUS at 19:30

## 2018-06-03 RX ADMIN — SODIUM CHLORIDE 14 UNITS/HR: 9 INJECTION, SOLUTION INTRAVENOUS at 03:13

## 2018-06-03 RX ADMIN — POTASSIUM CHLORIDE 10 MEQ: 10 INJECTION, SOLUTION INTRAVENOUS at 15:30

## 2018-06-03 RX ADMIN — METOCLOPRAMIDE 10 MG: 5 INJECTION, SOLUTION INTRAMUSCULAR; INTRAVENOUS at 14:01

## 2018-06-03 RX ADMIN — IOHEXOL 100 ML: 350 INJECTION, SOLUTION INTRAVENOUS at 02:41

## 2018-06-03 RX ADMIN — DEXTROSE MONOHYDRATE: 100 INJECTION, SOLUTION INTRAVENOUS at 15:27

## 2018-06-03 RX ADMIN — HYDROMORPHONE HYDROCHLORIDE 0.25 MG: 2 INJECTION INTRAMUSCULAR; INTRAVENOUS; SUBCUTANEOUS at 14:01

## 2018-06-03 RX ADMIN — POTASSIUM CHLORIDE 10 MEQ: 10 INJECTION, SOLUTION INTRAVENOUS at 20:45

## 2018-06-03 RX ADMIN — ONDANSETRON HYDROCHLORIDE 4 MG: 2 INJECTION INTRAMUSCULAR; INTRAVENOUS at 01:37

## 2018-06-03 RX ADMIN — DEXTROSE MONOHYDRATE: 100 INJECTION, SOLUTION INTRAVENOUS at 09:18

## 2018-06-03 RX ADMIN — HYDROMORPHONE HYDROCHLORIDE 0.25 MG: 2 INJECTION INTRAMUSCULAR; INTRAVENOUS; SUBCUTANEOUS at 20:47

## 2018-06-03 RX ADMIN — DEXTROSE MONOHYDRATE 50 ML: 25 INJECTION, SOLUTION INTRAVENOUS at 09:00

## 2018-06-03 RX ADMIN — POTASSIUM CHLORIDE 10 MEQ: 10 INJECTION, SOLUTION INTRAVENOUS at 19:30

## 2018-06-03 RX ADMIN — POTASSIUM CHLORIDE 10 MEQ: 10 INJECTION, SOLUTION INTRAVENOUS at 07:53

## 2018-06-03 RX ADMIN — DEXTROSE MONOHYDRATE 50 ML: 25 INJECTION, SOLUTION INTRAVENOUS at 14:17

## 2018-06-03 RX ADMIN — HYDROMORPHONE HYDROCHLORIDE 0.25 MG: 2 INJECTION INTRAMUSCULAR; INTRAVENOUS; SUBCUTANEOUS at 09:11

## 2018-06-03 RX ADMIN — METOCLOPRAMIDE 10 MG: 5 INJECTION, SOLUTION INTRAMUSCULAR; INTRAVENOUS at 20:28

## 2018-06-03 RX ADMIN — DEXTROSE AND SODIUM CHLORIDE: 10; .45 INJECTION, SOLUTION INTRAVENOUS at 08:04

## 2018-06-03 RX ADMIN — POTASSIUM CHLORIDE 10 MEQ: 10 INJECTION, SOLUTION INTRAVENOUS at 07:51

## 2018-06-03 RX ADMIN — ONDANSETRON HYDROCHLORIDE 4 MG: 2 INJECTION INTRAMUSCULAR; INTRAVENOUS at 17:32

## 2018-06-03 RX ADMIN — HYDROMORPHONE HYDROCHLORIDE 0.25 MG: 2 INJECTION INTRAMUSCULAR; INTRAVENOUS; SUBCUTANEOUS at 04:10

## 2018-06-03 RX ADMIN — HYDROMORPHONE HYDROCHLORIDE 0.25 MG: 2 INJECTION INTRAMUSCULAR; INTRAVENOUS; SUBCUTANEOUS at 17:32

## 2018-06-03 ASSESSMENT — PAIN SCALES - GENERAL
PAINLEVEL_OUTOF10: 8
PAINLEVEL_OUTOF10: 8
PAINLEVEL_OUTOF10: 4
PAINLEVEL_OUTOF10: 5
PAINLEVEL_OUTOF10: 8
PAINLEVEL_OUTOF10: 8
PAINLEVEL_OUTOF10: 4
PAINLEVEL_OUTOF10: 4
PAINLEVEL_OUTOF10: 8
PAINLEVEL_OUTOF10: 4
PAINLEVEL_OUTOF10: 8
PAINLEVEL_OUTOF10: 4

## 2018-06-03 ASSESSMENT — ENCOUNTER SYMPTOMS
BRUISES/BLEEDS EASILY: 0
DOUBLE VISION: 0
ORTHOPNEA: 0
STRIDOR: 0
PALPITATIONS: 0
PHOTOPHOBIA: 0
VOMITING: 1
EYE DISCHARGE: 0
BACK PAIN: 0
CLAUDICATION: 0
SEIZURES: 0
FEVER: 0
FOCAL WEAKNESS: 0
SINUS PAIN: 0
BLURRED VISION: 0
DEPRESSION: 0
HALLUCINATIONS: 0
SPUTUM PRODUCTION: 0
LOSS OF CONSCIOUSNESS: 0
CONSTIPATION: 0
ABDOMINAL PAIN: 1
NECK PAIN: 0
POLYDIPSIA: 1
CHILLS: 0
DIAPHORESIS: 0
MYALGIAS: 0
EYE PAIN: 0
SENSORY CHANGE: 0
HEMOPTYSIS: 0
COUGH: 0
SPEECH CHANGE: 0
HEADACHES: 0
NAUSEA: 1
NERVOUS/ANXIOUS: 0

## 2018-06-03 NOTE — CARE PLAN
Problem: Safety  Goal: Will remain free from injury  Outcome: PROGRESSING AS EXPECTED  Enc'd to call with any needs, Room close to the nurses station, bed remains in the lowest position.

## 2018-06-03 NOTE — PROGRESS NOTES
[facility] Hospitalist Progress Note    Date of Service: 6/3/2018    Chief Complaint  27 y.o. female admitted 2018 with nausea/vomiting and high blood sugars.    Ms Sparks has history of type I diabetes and frequent admissions for diabetic ketoacidosis. Diabetes complicated by gastroparesis.    Interval Problem Update  Hypotensive overnight, responded to LR bolus  On insulin drip, blood sugars down to 200's-300's, remains acidotic  NPO, still vomiting despite reglan and zofran  Throwing up nonbloody emesis, at times vomiting every 10 minutes  Generalized abdominal pain, she rates 6/10, worse when she throws up    Consultants/Specialty  Critical Care, I discussed the patient's condition with Dr. Gayle this morning on ICU Rounds    Disposition  Keep in ICU until off insulin drip      Review of Systems   Cardiovascular: Negative for chest pain, palpitations and orthopnea.   Gastrointestinal: Positive for abdominal pain, diarrhea, nausea and vomiting. Negative for blood in stool and constipation.   Genitourinary: Negative for dysuria and urgency.   Skin: Negative for itching and rash.      Physical Exam  Laboratory/Imaging   Hemodynamics  Temp (24hrs), Av.8 °C (98.2 °F), Min:36.1 °C (96.9 °F), Max:37.6 °C (99.7 °F)   Temperature: 37.6 °C (99.7 °F)  Pulse  Av.9  Min: 109  Max: 160 Heart Rate (Monitored): (!) 132  Blood Pressure: 118/75, NIBP: 117/64      Respiratory      Respiration: (!) 21, Pulse Oximetry: 97 %        RUL Breath Sounds: Clear, RML Breath Sounds: Clear, RLL Breath Sounds: Diminished, NANCY Breath Sounds: Clear, LLL Breath Sounds: Diminished    Fluids    Intake/Output Summary (Last 24 hours) at 18 0855  Last data filed at 18 0800   Gross per 24 hour   Intake          5838.01 ml   Output             3200 ml   Net          2638.01 ml       Nutrition  Orders Placed This Encounter   Procedures   • DIET NPO     Standing Status:   Standing     Number of Occurrences:   1     Order  Specific Question:   Restrict to:     Answer:   Strict [1]     Physical Exam   Constitutional: She is oriented to person, place, and time. She appears well-developed and well-nourished.   HENT:   Head: Normocephalic and atraumatic.   Eyes: Conjunctivae and EOM are normal. Right eye exhibits no discharge. Left eye exhibits no discharge.   Neck: Normal range of motion. Neck supple.   Cardiovascular: Normal rate, regular rhythm and intact distal pulses.    No murmur heard.  2+ Radial Pulses  Brisk Capillary Refill   Pulmonary/Chest: Effort normal and breath sounds normal. No respiratory distress. She has no wheezes.   Abdominal: Soft. Bowel sounds are normal. She exhibits no distension. There is tenderness. There is no rebound.   Musculoskeletal: Normal range of motion. She exhibits no edema.   Neurological: She is alert and oriented to person, place, and time. No cranial nerve deficit.   Skin: Skin is warm and dry. She is not diaphoretic. No erythema.   Skin is warm and well perfused   Psychiatric: She has a normal mood and affect.       Recent Labs      06/02/18   1024  06/02/18   1840  06/03/18   0300   WBC  10.2  8.6  13.9*   RBC  5.82*  4.56  4.66   HEMOGLOBIN  17.4*  13.8  13.9   HEMATOCRIT  49.4*  41.1  42.1   MCV  84.9  89.7  90.3   MCH  29.9  29.6  29.8   MCHC  35.2*  33.0*  33.0*   RDW  41.5  45.1  46.9   PLATELETCT  310  200  234   MPV  10.0  10.5  10.6     Recent Labs      06/03/18   0155  06/03/18   0300  06/03/18   0625   SODIUM  147*  146*  146*   POTASSIUM  5.1  4.8  4.1   CHLORIDE  117*  117*  121*   CO2  5*  6*  13*   GLUCOSE  378*  309*  173*   BUN  13  12  9   CREATININE  1.07  0.98  0.83   CALCIUM  9.0  8.7  9.0                      Assessment/Plan     * Diabetic ketoacidosis (HCC)- (present on admission)   Assessment & Plan    Insulin sliding scale, titrate rate to serial lab values  She remains acidotic, for now we will continue the drip, try to avoid hyperchloremia  Transition to to subQ insulin  when acidosis is resolved          Type 1 diabetes mellitus (HCC)- (present on admission)   Assessment & Plan    Uncontrolled, HbA1c 12.5  Insulin drip for now  Encourage compliance with insulin        Hypophosphatemia- (present on admission)   Assessment & Plan    Replete        Hypocalcemia- (present on admission)   Assessment & Plan    Replete          Quality-Core Measures   Reviewed items::  Labs reviewed and Medications reviewed  Pitt catheter::  No Pitt  DVT prophylaxis pharmacological::  Enoxaparin (Lovenox)

## 2018-06-03 NOTE — CARE PLAN
Problem: Knowledge Deficit  Goal: Knowledge of disease process/condition, treatment plan, diagnostic tests, and medications will improve    Intervention: Assess knowledge level of disease process/condition, treatment plan, diagnostic tests, and medications  Educated and assessed patient knowledge on current medication and treatment plan with insulin.

## 2018-06-03 NOTE — PROGRESS NOTES
Pulmonary Critical Care Progress Note      Date of service:  6/3/2018    Chief Complaint:   DKA    Interval Events:  24 hour interval history reviewed  Reason for visit:    DKA       - oriented x 4   - ST   - DKA   - gastroparesis   - change to D5W   - insulin gtt titrating      Review of Systems   Constitutional: Negative for chills, diaphoresis and fever.   HENT: Negative for congestion, ear discharge, ear pain, nosebleeds and sinus pain.    Eyes: Negative for blurred vision, double vision, photophobia, pain and discharge.   Respiratory: Negative for cough, hemoptysis, sputum production and stridor.    Cardiovascular: Negative for chest pain, palpitations, orthopnea and claudication.   Gastrointestinal: Positive for abdominal pain, nausea and vomiting. Negative for constipation.   Genitourinary: Negative for dysuria, frequency and urgency.   Musculoskeletal: Negative for back pain, myalgias and neck pain.   Skin: Negative for itching and rash.   Neurological: Negative for sensory change, speech change, focal weakness, seizures, loss of consciousness and headaches.   Endo/Heme/Allergies: Positive for polydipsia. Negative for environmental allergies. Does not bruise/bleed easily.   Psychiatric/Behavioral: Negative for depression, hallucinations and suicidal ideas. The patient is not nervous/anxious.        Physical Exam   Constitutional: She is oriented to person, place, and time.   HENT:   Head: Normocephalic and atraumatic.   Right Ear: External ear normal.   Left Ear: External ear normal.   Nose: Nose normal.   Dry mucous membranes   Eyes: Conjunctivae are normal. Pupils are equal, round, and reactive to light. Right eye exhibits no discharge. Left eye exhibits no discharge. No scleral icterus.   Neck: Normal range of motion. Neck supple. No JVD present. No tracheal deviation present.   Cardiovascular: Intact distal pulses.  Exam reveals no gallop and no friction rub.    No murmur heard.  Sinus rhythm    Pulmonary/Chest: No stridor. She has no wheezes. She has no rales.   Abdominal: Soft. Bowel sounds are normal. She exhibits no distension. There is tenderness (Mild tenderness in the midabdomen). There is no rebound.   Musculoskeletal: She exhibits no tenderness or deformity.   No clubbing or cyanosis   Neurological: She is alert and oriented to person, place, and time. No cranial nerve deficit. Coordination normal. GCS score is 15.   No focal weakness   Skin: Skin is warm and dry. No rash noted. She is not diaphoretic. No erythema. No pallor.       PFSH:  No change.    Respiratory:     Pulse Oximetry: 98 %  CXR images personally reviewed and compared to prior images  CXR with clear lungs    HemoDynamics:  Pulse: (!) 140, Heart Rate (Monitored): (!) 140  Blood Pressure: 118/75, NIBP: 116/62       Neuro:    Fluids:  Intake/Output       06/01/18 0700 - 06/02/18 0659 (Not Admitted) 06/02/18 0700 - 06/03/18 0659 06/03/18 0700 - 06/04/18 0659      6618-4348 2104-9833 Total 2406-4400 8109-4261 Total 7279-6482 8873-6856 Total       Intake    P.O.  --  -- --  --  0 0  --  -- --    P.O. -- -- -- -- 0 0 -- -- --    I.V.  --  -- --  --  5226 5226  --  -- --    Insulin Volume -- -- -- -- 426 426 -- -- --    IV Piggyback Volume (IV Piggyback) -- -- -- -- 600 600 -- -- --    IV Volume (IV bolus) -- -- -- -- 3000 3000 -- -- --    IV Volume (1/2 NS) -- -- -- -- 900 900 -- -- --    IV Volume (D5 1/2NS) -- -- -- -- 300 300 -- -- --    Total Intake -- -- -- -- 5226 5226 -- -- --       Output    Urine  --  -- --  --  2200 2200  --  -- --    Urine Void (mL) (non-catheter) -- -- -- -- 2200 2200 -- -- --    Emesis  --  -- --  --  900 900  --  -- --    Emesis -- -- -- -- 900 900 -- -- --    Total Output -- -- -- -- 3100 3100 -- -- --       Net I/O     -- -- -- -- 2126 2126 -- -- --        Weight: 85.6 kg (188 lb 11.4 oz)  Recent Labs      06/02/18   1024   06/02/18   2250  06/03/18   0155  06/03/18   0300   SODIUM  141   < >  143  147*   146*   POTASSIUM  3.7   < >  4.9  5.1  4.8   CHLORIDE  108   < >  116*  117*  117*   CO2  11*   < >  <5*  5*  6*   BUN  21   < >  13  13  12   CREATININE  1.05   < >  0.85  1.07  0.98   MAGNESIUM  1.8   --    --   2.1   --    PHOSPHORUS  2.3*   --    --    --   4.5   CALCIUM  10.3   < >  9.1  9.0  8.7    < > = values in this interval not displayed.       GI/Nutrition:    Liver Function  Recent Labs      18   1024   18   1840  18   2250  18   0155  18   0300   ALTSGPT  18   --   13   --    --    --    ASTSGOT  15   --   20   --    --    --    ALKPHOSPHAT  134*   --   86   --    --    --    TBILIRUBIN  0.4   --   0.5   --    --    --    LIPASE  10*   --    --    --    --    --    GLUCOSE  240*   < >  529*  465*  378*  309*    < > = values in this interval not displayed.       Heme:  Recent Labs      18   1024  18   18418   0300   RBC  5.82*  4.56  4.66   HEMOGLOBIN  17.4*  13.8  13.9   HEMATOCRIT  49.4*  41.1  42.1   PLATELETCT  310  200  234       Infectious Disease:  Temp  Av.8 °C (98.2 °F)  Min: 36.1 °C (96.9 °F)  Max: 37.6 °C (99.7 °F)    Recent Labs      18   1024  18   18418   0300   WBC  10.2  8.6  13.9*   NEUTSPOLYS  76.00*  70.30  86.00*   LYMPHOCYTES  16.50*  23.40  7.70*   MONOCYTES  6.30  5.00  5.30   EOSINOPHILS  0.40  0.30  0.00   BASOPHILS  0.50  0.50  0.10   ASTSGOT  15  20   --    ALTSGPT  18  13   --    ALKPHOSPHAT  134*  86   --    TBILIRUBIN  0.4  0.5   --      Current Facility-Administered Medications   Medication Dose Frequency Provider Last Rate Last Dose   • atorvastatin (LIPITOR) tablet 20 mg  20 mg QHS Pa Urbina M.D.   Stopped at 18 2100   • vitamin D (cholecalciferol) tablet 2,000 Units  2,000 Units DAILY Pa Urbina M.D.   Stopped at 18 1430   • ferrous sulfate tablet 325 mg  325 mg DAILY Pa Urbina M.D.   Stopped at 18 1702   • metoclopramide (REGLAN) tablet 10 mg   10 mg TID AC Pa Urbina M.D.   Stopped at 06/02/18 1700   • senna-docusate (PERICOLACE or SENOKOT S) 8.6-50 MG per tablet 2 Tab  2 Tab BID Pa Urbina M.D.   Stopped at 06/02/18 2100    And   • polyethylene glycol/lytes (MIRALAX) PACKET 1 Packet  1 Packet QDAY PRN Pa Urbina M.D.        And   • magnesium hydroxide (MILK OF MAGNESIA) suspension 30 mL  30 mL QDAY PRN Pa Urbina M.D.        And   • bisacodyl (DULCOLAX) suppository 10 mg  10 mg QDAY PRN Pa Urbina M.D.       • enoxaparin (LOVENOX) inj 40 mg  40 mg DAILY Pa Urbina M.D.   40 mg at 06/02/18 1958   • ondansetron (ZOFRAN) syringe/vial injection 4 mg  4 mg Q4HRS PRN Pa Urbina M.D.   4 mg at 06/03/18 0607   • ondansetron (ZOFRAN ODT) dispertab 4 mg  4 mg Q4HRS PRN Pa Urbina M.D.       • acetaminophen (TYLENOL) tablet 650 mg  650 mg Q6HRS PRN Pa Urbina M.D.       • Pharmacy Consult Request ...Pain Management Review   PRN Pa Urbina M.D.        And   • oxyCODONE immediate-release (ROXICODONE) tablet 2.5 mg  2.5 mg Q3HRS PRN Pa Urbina M.D.        And   • oxyCODONE immediate-release (ROXICODONE) tablet 5 mg  5 mg Q3HRS PRN Pa Urbina M.D.   5 mg at 06/02/18 1537    And   • HYDROmorphone (DILAUDID) injection 0.25 mg  0.25 mg Q3HRS PRN Pa Urbina M.D.   0.25 mg at 06/03/18 0410   • labetalol (NORMODYNE,TRANDATE) injection 10 mg  10 mg Q4HRS PRN Pa Urbina M.D.       • glucose 4 g chewable tablet 16 g  16 g Q15 MIN PRN Pa Urbina M.D.        And   • dextrose 50% (D50W) injection 25 mL  25 mL Q15 MIN PRN Pa Urbina M.D.       • dextrose 10% and 0.45% NaCl infusion   Continuous Pa Urbina M.D.   Stopped at 06/02/18 1730   • MD ALERT-PHARMACY TO CONSULT FOR DKA MONITORING 1 Each  1 Each PRN Pa Urbina M.D.       • magnesium sulfate IVPB premix 2 g  2 g Once PRN Pa Urbina M.D.         Or   • magnesium sulfate IVPB premix 4 g  4 g Once PRN Pa Urbina M.D.       • potassium phosphates 30 mmol in  mL ivpb  30 mmol Once PRN Pa Urbina M.D.        Or   • sodium phosphate 30 mmol in 1/2  mL ivpb  30 mmol Once PRN Pa Urbina M.D.       • Adult DKA potassium(K+) replacement scale  1 Each Q4HRS Pa Urbina M.D.   1 Each at 06/03/18 0600   • 1/2 NS infusion   Continuous Pa Urbina M.D. 150 mL/hr at 06/02/18 1853     • D5 1/2 NS infusion   Continuous Pa Urbina M.D. 150 mL/hr at 06/03/18 0403     • insulin regular human (HUMULIN/NOVOLIN R) 62.5 Units in  mL Infusion for DKA  4 Units/hr Continuous Pa Urbina M.D. 56 mL/hr at 06/03/18 0600 14 Units/hr at 06/03/18 0600     Last reviewed on 6/2/2018 11:56 AM by Blade Darden    Quality  Measures:  Labs reviewed, Medications reviewed and Radiology images reviewed  Pitt catheter: No Pitt      DVT Prophylaxis: Enoxaparin (Lovenox)  DVT prophylaxis - mechanical: SCDs  Ulcer prophylaxis: Not indicated          Assessment and Plan:    DKA   -I am titrating an insulin drip   -Hourly glucose   -Replete potassium, phosphorus and magnesium as necessary    Diabetic gastroparesis   -Symptomatic control of nausea and vomiting    DM type I   -Now in DKA      I have assessed and reassessed her blood glucose with active titration of an insulin drip, electrolytes, acid-base status and response to IV fluid resuscitation.  She has life-threatening metabolic acidosis and diabetic ketoacidosis.  She is at increased risk for worsening metabolic, endocrine and cardiovascular system dysfunction.    High risk of deterioration and worsening vital organ dysfunction and death without the above critical care interventions.    Critical Care Time:  32 minutes  34189  No time overlap  Time excludes procedures  Discussed with RN, RT, Team, Clinical pharmacist, Hospitalist

## 2018-06-03 NOTE — CARE PLAN
Problem: Pain Management  Goal: Pain level will decrease to patient's comfort goal  Outcome: PROGRESSING AS EXPECTED  Pt. Able to report pain levels. Medicating per MAR with IVP dilaudid, pt. States this is helping to ease the pain. Continue to monitor pain levels. Frequent repositioning and relaxation techniques used as well.

## 2018-06-03 NOTE — PROGRESS NOTES
6/2/18 1920: Lab called critical lab values, FW=651, CO2=6. MD aware, pt. Currently on the DKA protocol. Insulin drip infusing, monitor BG levels Q-1 hour, replacing electrolytes per protocol. Pt. With nausea/vomiting, Zofran PRN given per MAR.    06/02/18 2100: Dr. Shah at the bedside, assessing pt. Pt. Continue with nausea/vomiting, Zofran 4mg IVP x1 now dose ordered.     06/02/18 2250: Lab called critical lab value, CO2=<5. MD aware, pt. Currently on the DKA protocol.     06/03/18 0110: Pharmacist called regarding the CO2 levels, they are going to call Dr. Shah to see if further interventions should be done.     06/0318 0145: New orders entered from Dr. Shah to give LR bolus x2 liters, CT abdomen/pelvis and lactic acid x1 now.     06/03/18 0230: Pt. Off unit for CT of the abdomen/pelvis with IV contrast with this RN and CNA on the monitor and room air.     06/03/18 0300: Pt. Back to unit. Tolerated the CT scan, some nausea and vomiting, continuing to give Zofran per MAR. ST in the 140-150's.    06/03/18 0330: Lab called a critical Co2=5, Notified Dr. Shah. New orders to draw another lactic level at 0600 this AM. Continue with the DKA protocol.      06/03/18 0405: Lab called a critical CO2=6, MD aware, continue on DKA protocol.

## 2018-06-03 NOTE — PROGRESS NOTES
Informed Dr. Gayle regarding the sugar of 87. Stated to drop rate of insulin drip to 4 and give amp of D50. Pharmacist Brant agreed as well. Fluids were changed and verified with Brant to then follow with protocol following.

## 2018-06-03 NOTE — PROGRESS NOTES
Spoke to MD about lab results.  Md stated Transfer to Unit.  Pt made aware.  Will continue to closely monitor. Call light light within reach.

## 2018-06-03 NOTE — PROGRESS NOTES
Cristel from Lab called with critical result of Ca 6.7 and Co2 9 at 1700. Critical lab result read back to Cristel.   Dr. Jackelin Urbina notified of critical lab result at 1706.  Critical lab result read back by Dr. Jackeiln Urbina.

## 2018-06-03 NOTE — CARE PLAN
Problem: Safety  Goal: Will remain free from injury    Intervention: Educate patient and significant other/support system about adaptive mobility strategies and safe transfers  Educated patient on importance of mobilizing with help and calling when needing to get out of bed.

## 2018-06-03 NOTE — H&P
CHIEF COMPLAINT:  Elevated blood sugars and nausea and vomiting.    PRIMARY CARE PROVIDER:  Navi Huber MD    HISTORY OF PRESENT ILLNESS:  The patient is a 27-year-old female with history   of type 1 diabetes and previous hospitalizations for DKA.  She presented to   the emergency room for evaluation of elevated blood sugars for the past week   associated with nausea, vomiting that started last night associated with   crampy generalized abdominal pain.  She had an episode of diarrhea 2 days ago.    She feels that her symptoms tonight are reminiscent of her previous episodes   of DKA.  She had bilious vomiting with no hemetemesis or coffee-ground   emesis.  No fever.  Last menstrual period was 2 days ago.  She has been   feeling lightheaded.  No loss of consciousness.    She denies any cough or hemoptysis.  No headache.  No focal weakness.  No   changes in vision.    REVIEW OF SYSTEMS:  As above.  All other systems reviewed and negative.    PAST MEDICAL HISTORY:  Significant for type 1 diabetes, recurrent DKA,   gastroparesis, history of UTI and pneumonia.    PAST SURGICAL HISTORY:  EGD with biopsy, submandibular abscess incision and   drainage, dental extractions.    SOCIAL HISTORY:  She does not smoke.  No alcohol or illicit drug use.    FAMILY HISTORY:  Reviewed, not pertinent to the presenting problem.    HOME MEDICATIONS:  Lipitor 20 mg daily; vitamin D 2000 units daily; iron   supplement; Lantus 33 units b.i.d., she took her last dose this morning;   Apidra 20-30 units 3 times a day before meals; Reglan 10 mg 3 times a day   before meals.    PHYSICAL EXAMINATION:  GENERAL:  She is alert, oriented x3.  VITAL SIGNS:  Temperature is 36.1, pulse is 120, respiratory rate is 20, blood   pressure is 109/81 and pulse oximetry is 98%.  HEAD AND NECK:  Pupils equal.  Supple neck.  No jugular venous distention.    Oropharynx is clear.  No cervical lymphadenopathy.  HEART:  Regular rate and rhythm, normal S1,  S2.  No murmurs, rubs or gallops.    She is tachycardic.  LUNGS:  Clear with symmetric air entry bilaterally.  No chest wall tenderness.  ABDOMEN:  Soft.  She has mild generalized tenderness.  No guarding or rebound.    Bowel sounds are positive.  No hepatosplenomegaly.  EXTREMITIES:  No edema, no clubbing, no cyanosis.  NEUROLOGIC:  No focal deficits.  SKIN:  No rash.    DIAGNOSTICS:  Sodium 141, potassium ____, chloride 108, bicarbonate 11,   glucose 240, BUN 21 and creatinine 1.05.  AST is 15, ALT is 18, alkaline   phosphatase 134, total bilirubin 0.4 and albumin is 5.2.  Magnesium is 1.8,   lipase is 10.  Beta hydroxybutyric acid was 3.65.  The patient was hydrated   with 2 liters of fluid and her repeat chemistry panel, sodium 143, potassium   3.3, chloride 114, bicarbonate 16, anion gap 13, glucose 107, BUN 18 and   creatinine 0.67.  White blood cell count 10.2, hemoglobin is 16.4, hematocrit   49.4 and platelet count 310.  Beta hCG was negative.  Chest x-ray revealed no   acute cardiac or pulmonary abnormalities identified.  Urinalysis is pending.    ASSESSMENT AND PLAN:  1.  Diabetic ketoacidosis, given the initial improvement with IV fluids, it   was felt that it would be reasonable to try to manage her with subcutaneous   insulin, IV hydration; however, I ordered repeat BMP, which was reviewed and   revealed worsening acidosis with a bicarbonate of 9 and anion gap of 15, so   the patient will be started on insulin drip with close monitoring of her blood   sugars and electrolytes.    We will continue with IV fluids for hydration.    2.  Hypokalemia and hypocalcemia.  We will replete her electrolytes and   monitor accordingly.  3.  Dyslipidemia.  Continue with Lipitor.  4.  Hypomagnesemia, replete with IV magnesium sulfate.  5.  Hypophosphatemia.  replete with K-Phos.  6.  Nausea and vomiting secondary to diabetic ketoacidosis.  We will continue   with IV hydration and start her on IV antiemetics.    She  will be started on Lovenox for deep venous thrombosis prophylaxis.    Plan of care was reviewed with the patient and her questions answered.    The patient would require more than 2 midnight stay for treatment of her medical condition.    The patient is critically ill with DKA, being treated with IV fluids,   electrolytes replacement and insulin drip.    Total critical care time spent today was 50 minutes, no overlap and excluding   any procedure time.         ____________________________________     MD ANGI HUFFMAN / NTS    DD:  06/02/2018 17:31:30  DT:  06/02/2018 17:57:46    D#:  [id number]  Job#:  [id number]

## 2018-06-03 NOTE — CONSULTS
Critical Care/Pulmonary Consultation    Date of service: 6/2/2018    Consulting Physician: Pa Urbina M.D.    Chief Complaint:  Nausea and vomiting    History of Present Illness: Alis Sparks is a 27 y.o. female with a past medical history of type 1 DM with previous hospitalizations for DKA complicated by gastroparesis who presented to the ER today with complaints of nausea and vomiting since last night.  She notes that she has had elevated blood sugars for the past week.  She notes that the nausea and vomiting is associated with crampy generalized abdominal pain.  She had an episode of watery diarrhea 2 days ago.  She notes that the vomitus is bilious in nature without coffee ground emesis or hematemesis.  No blood in the diarrhea.  She notes that her symptoms are the same from her previous DKA episodes.  She had had lightheadedness without syncope.  No cough, chest pain, recent infections, urinary symptoms, or recent sick contacts.    She was initially admitted to the CDU as her labs were not profoundly consistent with DKA; however, upon further checking and continued nausea/vomiting, the patient went into severe DKA requiring transfer into the ICU with insulin drip infusing.    Review of Systems   Constitutional: Positive for diaphoresis and malaise/fatigue. Negative for chills and fever.   HENT: Negative for congestion, ear pain, hearing loss and sore throat.    Eyes: Negative for blurred vision, double vision and photophobia.   Respiratory: Negative for cough and shortness of breath.    Cardiovascular: Negative for chest pain, palpitations and leg swelling.   Gastrointestinal: Positive for abdominal pain, heartburn, nausea and vomiting. Negative for blood in stool and diarrhea.   Genitourinary: Negative for dysuria, frequency, hematuria and urgency.   Musculoskeletal: Negative for back pain, joint pain and neck pain.   Skin: Negative.    Neurological: Positive for dizziness and weakness.  Negative for sensory change, focal weakness, seizures and headaches.   Endo/Heme/Allergies: Negative.    Psychiatric/Behavioral: Negative for depression and substance abuse. The patient is not nervous/anxious.        No current facility-administered medications on file prior to encounter.      Current Outpatient Prescriptions on File Prior to Encounter   Medication Sig Dispense Refill   • insulin glulisine (APIDRA) 100 UNIT/ML Solution Take per home sliding scale three times daily before meals (Patient taking differently: Inject 20-30 Units as instructed 3 times a day before meals. Take per home sliding scale three times daily before meals) 1 Vial 10   • insulin glargine (BASAGLAR KWIKPEN) 100 UNIT/ML Solution Pen-injector injection Inject 33 Units as instructed 2 times a day.     • ferrous sulfate 325 (65 Fe) MG tablet Take 325 mg by mouth every day.     • Cholecalciferol 2000 UNIT Cap Take 1 Cap by mouth every day.     • atorvastatin (LIPITOR) 20 MG Tab Take 1 Tab by mouth every day. 30 Tab 0       Social History   Substance Use Topics   • Smoking status: Never Smoker   • Smokeless tobacco: Never Used   • Alcohol use No        Past Medical History:   Diagnosis Date   • Backpain    • Bronchitis    • Diabetes type 1, controlled (HCC)     tests 4-5 times daily   • DKA (diabetic ketoacidoses) (Formerly [facility])    • Fall    • Gastroparesis    • Kidney infection    • Pneumonia    • UTI (urinary tract infection)        Past Surgical History:   Procedure Laterality Date   • SUBMANDIBLE ABSCESS INCISION AND DRAINAGE Left 11/8/2017    Procedure: SUBMANDIBLE ABSCESS INCISION AND DRAINAGE;  Surgeon: Osman Young M.D.;  Location: SURGERY [facility];  Service: Dental   • DENTAL EXTRACTION(S)  11/8/2017    Procedure: DENTAL EXTRACTION(S);  Surgeon: Osman Young M.D.;  Location: SURGERY [facility];  Service: Dental   • GASTROSCOPY-ENDO  9/18/2014    Performed by Sylvain PERRY M.D. at ENDOSCOPY [facility]   • GASTROSCOPY  WITH BIOPSY  9/18/2014    Performed by Sylvain PERRY M.D. at ENDOSCOPY [facility]   • OTHER      surgery to patch lung after pneumor from central line placement     Allergies: Pcn [penicillins]; Lisinopril; and Promethazine    Family History   Problem Relation Age of Onset   • Cancer       breasts, multiple family members   • Hypertension Maternal Grandfather        Physical Examination  Drips: insulin 8 units/hr  Vitals: 110-130s/60-80s, 110-140s, 18-23, 98.6, 97% on room air  General: well developed/obese female who appears acutely ill and mild distress due to nausea  Neuro/Psych: clear speech, a/ox4, cn ii-xii grossly intact, motor/sensory intact, coordination intact, flat affect, normal judgement, depressed mood  HEENT: perrl, eomi, conj: pink, sclera: anicteric, oral: good dentition, dry mucous membranes, clear pp, hearing intact, lids/lashes: no lesions  Neck: supple, FROM, no thyromegaly, no cervical adenopathy  CVS: rapid rate and rhythm, no murmurs, good cap refill  Respiratory: clear throughout, good effort, mild tachypnea, no chest wall tenderness  Abdomen/: diffusely tender with mild palpation, hypoactive bowel sounds, soft, no distension, no masses, no guarding  Back: no CVA tenderness bilaterally, no spinal tenderness  Extremities: FROM x 4, 2+ dpp=, 2+ radial pulses, calves non tender, no clubbing/cyanosis/edema, no inguinal adenopathy, no joint deformities/effusions  Skin: warm/dry, no rashes    Recent Labs      06/02/18   1024   WBC  10.2   NEUTSPOLYS  76.00*   LYMPHOCYTES  16.50*   MONOCYTES  6.30   EOSINOPHILS  0.40   BASOPHILS  0.50   ASTSGOT  15   ALTSGPT  18   ALKPHOSPHAT  134*   TBILIRUBIN  0.4     Recent Labs      06/02/18   1024  06/02/18   1210  06/02/18   1618   SODIUM  141  143  144   POTASSIUM  3.7  3.3*  3.7   CHLORIDE  108  114*  120*   CO2  11*  16*  9*   BUN  21  18  13   CREATININE  1.05  0.67  0.50   MAGNESIUM  1.8   --    --    PHOSPHORUS  2.3*   --    --    CALCIUM  10.3   8.5  6.7*     Recent Labs      06/02/18   1024  06/02/18   1210  06/02/18   1618   ALTSGPT  18   --    --    ASTSGOT  15   --    --    ALKPHOSPHAT  134*   --    --    TBILIRUBIN  0.4   --    --    LIPASE  10*   --    --    GLUCOSE  240*  107*  286*           Invalid input(s): [id number]  DX-CHEST-PORTABLE (1 VIEW)   Final Result      No acute cardiac or pulmonary abnormalities are identified.          Assessment and Plan:    Acute DKA   - DKA protocols initiated   - cont insulin drip and titrating until AG closed and bicarb >18   - electrolytes every 4 hours and correct as needed   - NPO for now  Acute Nausea/Vomiting   - hx of gastroparesis   - cont zofran/dilaudid   - NPO  Hyperkalemia   - likely due to metabolic acidosis   - continue insulin drip and IVF  Acute AGMA   - due to DKA   - follow BMPs   - continue insulin drip/IVF  Hypomagnesemia    - repleting  Hypophosphatemia   - repleting  Hx of type I DM   - in dka now  Hx of gastroparesis   - zofran/reglan/dilaudid  Prophylaxis   - scds    Patient is critically ill.   There has been no overlap in critical care time.   Critical Care Time not including procedures: 34 minutes  [id number]

## 2018-06-03 NOTE — PROGRESS NOTES
Pt to S132 at 1815.    VSS    , SR  /78  Temp 99.1f  RR 20  o2 97% RA    Pt AOx4, denies pain and in no apparent distress aside from known N/V with emesis.     Dr. Sweet updated on pt's location, and to pass to NOC Intensivist.     Labs collected at 1840 prior to initiation of DKA protocol.

## 2018-06-04 ENCOUNTER — APPOINTMENT (OUTPATIENT)
Dept: RADIOLOGY | Facility: MEDICAL CENTER | Age: 28
DRG: 638 | End: 2018-06-04
Attending: INTERNAL MEDICINE
Payer: MEDICAID

## 2018-06-04 LAB
ANION GAP SERPL CALC-SCNC: 14 MMOL/L (ref 0–11.9)
ANION GAP SERPL CALC-SCNC: 14 MMOL/L (ref 0–11.9)
ANION GAP SERPL CALC-SCNC: 16 MMOL/L (ref 0–11.9)
ANION GAP SERPL CALC-SCNC: 9 MMOL/L (ref 0–11.9)
B-OH-BUTYR SERPL-MCNC: 0.18 MMOL/L (ref 0.02–0.27)
BASOPHILS # BLD AUTO: 0.7 % (ref 0–1.8)
BASOPHILS # BLD: 0.05 K/UL (ref 0–0.12)
BUN SERPL-MCNC: 5 MG/DL (ref 8–22)
BUN SERPL-MCNC: 6 MG/DL (ref 8–22)
CALCIUM SERPL-MCNC: 8.3 MG/DL (ref 8.5–10.5)
CALCIUM SERPL-MCNC: 8.4 MG/DL (ref 8.5–10.5)
CALCIUM SERPL-MCNC: 8.5 MG/DL (ref 8.5–10.5)
CALCIUM SERPL-MCNC: 8.5 MG/DL (ref 8.5–10.5)
CHLORIDE SERPL-SCNC: 103 MMOL/L (ref 96–112)
CHLORIDE SERPL-SCNC: 107 MMOL/L (ref 96–112)
CHLORIDE SERPL-SCNC: 110 MMOL/L (ref 96–112)
CHLORIDE SERPL-SCNC: 112 MMOL/L (ref 96–112)
CO2 SERPL-SCNC: 15 MMOL/L (ref 20–33)
CO2 SERPL-SCNC: 15 MMOL/L (ref 20–33)
CO2 SERPL-SCNC: 17 MMOL/L (ref 20–33)
CO2 SERPL-SCNC: 17 MMOL/L (ref 20–33)
COMMENT 1642: NORMAL
CREAT SERPL-MCNC: 0.38 MG/DL (ref 0.5–1.4)
CREAT SERPL-MCNC: 0.53 MG/DL (ref 0.5–1.4)
CREAT SERPL-MCNC: 0.55 MG/DL (ref 0.5–1.4)
CREAT SERPL-MCNC: 0.6 MG/DL (ref 0.5–1.4)
EOSINOPHIL # BLD AUTO: 0.09 K/UL (ref 0–0.51)
EOSINOPHIL NFR BLD: 1.2 % (ref 0–6.9)
ERYTHROCYTE [DISTWIDTH] IN BLOOD BY AUTOMATED COUNT: 42.5 FL (ref 35.9–50)
GLUCOSE BLD-MCNC: 132 MG/DL (ref 65–99)
GLUCOSE BLD-MCNC: 146 MG/DL (ref 65–99)
GLUCOSE BLD-MCNC: 147 MG/DL (ref 65–99)
GLUCOSE BLD-MCNC: 151 MG/DL (ref 65–99)
GLUCOSE BLD-MCNC: 166 MG/DL (ref 65–99)
GLUCOSE BLD-MCNC: 167 MG/DL (ref 65–99)
GLUCOSE BLD-MCNC: 168 MG/DL (ref 65–99)
GLUCOSE BLD-MCNC: 176 MG/DL (ref 65–99)
GLUCOSE BLD-MCNC: 179 MG/DL (ref 65–99)
GLUCOSE BLD-MCNC: 183 MG/DL (ref 65–99)
GLUCOSE BLD-MCNC: 189 MG/DL (ref 65–99)
GLUCOSE BLD-MCNC: 193 MG/DL (ref 65–99)
GLUCOSE BLD-MCNC: 207 MG/DL (ref 65–99)
GLUCOSE BLD-MCNC: 227 MG/DL (ref 65–99)
GLUCOSE BLD-MCNC: 251 MG/DL (ref 65–99)
GLUCOSE BLD-MCNC: 252 MG/DL (ref 65–99)
GLUCOSE BLD-MCNC: 270 MG/DL (ref 65–99)
GLUCOSE BLD-MCNC: 274 MG/DL (ref 65–99)
GLUCOSE BLD-MCNC: 324 MG/DL (ref 65–99)
GLUCOSE BLD-MCNC: 407 MG/DL (ref 65–99)
GLUCOSE BLD-MCNC: 87 MG/DL (ref 65–99)
GLUCOSE BLD-MCNC: 97 MG/DL (ref 65–99)
GLUCOSE SERPL-MCNC: 184 MG/DL (ref 65–99)
GLUCOSE SERPL-MCNC: 218 MG/DL (ref 65–99)
GLUCOSE SERPL-MCNC: 349 MG/DL (ref 65–99)
GLUCOSE SERPL-MCNC: 426 MG/DL (ref 65–99)
HCT VFR BLD AUTO: 37.7 % (ref 37–47)
HGB BLD-MCNC: 12.6 G/DL (ref 12–16)
IMM GRANULOCYTES # BLD AUTO: 0.02 K/UL (ref 0–0.11)
IMM GRANULOCYTES NFR BLD AUTO: 0.3 % (ref 0–0.9)
LV EJECT FRACT  99904: 60
LV EJECT FRACT MOD 2C 99903: 55.82
LV EJECT FRACT MOD 4C 99902: 58.4
LV EJECT FRACT MOD BP 99901: 57.73
LYMPHOCYTES # BLD AUTO: 1.94 K/UL (ref 1–4.8)
LYMPHOCYTES NFR BLD: 25.4 % (ref 22–41)
MCH RBC QN AUTO: 28.9 PG (ref 27–33)
MCHC RBC AUTO-ENTMCNC: 33.4 G/DL (ref 33.6–35)
MCV RBC AUTO: 86.5 FL (ref 81.4–97.8)
MONOCYTES # BLD AUTO: 0.71 K/UL (ref 0–0.85)
MONOCYTES NFR BLD AUTO: 9.3 % (ref 0–13.4)
MORPHOLOGY BLD-IMP: NORMAL
NEUTROPHILS # BLD AUTO: 4.83 K/UL (ref 2–7.15)
NEUTROPHILS NFR BLD: 63.1 % (ref 44–72)
NRBC # BLD AUTO: 0 K/UL
NRBC BLD-RTO: 0 /100 WBC
PLATELET # BLD AUTO: 166 K/UL (ref 164–446)
PMV BLD AUTO: 11 FL (ref 9–12.9)
POTASSIUM SERPL-SCNC: 3.5 MMOL/L (ref 3.6–5.5)
POTASSIUM SERPL-SCNC: 3.5 MMOL/L (ref 3.6–5.5)
POTASSIUM SERPL-SCNC: 3.7 MMOL/L (ref 3.6–5.5)
POTASSIUM SERPL-SCNC: 3.9 MMOL/L (ref 3.6–5.5)
RBC # BLD AUTO: 4.36 M/UL (ref 4.2–5.4)
SODIUM SERPL-SCNC: 136 MMOL/L (ref 135–145)
SODIUM SERPL-SCNC: 136 MMOL/L (ref 135–145)
SODIUM SERPL-SCNC: 138 MMOL/L (ref 135–145)
SODIUM SERPL-SCNC: 139 MMOL/L (ref 135–145)
WBC # BLD AUTO: 7.6 K/UL (ref 4.8–10.8)

## 2018-06-04 PROCEDURE — 700105 HCHG RX REV CODE 258: Performed by: INTERNAL MEDICINE

## 2018-06-04 PROCEDURE — 93306 TTE W/DOPPLER COMPLETE: CPT

## 2018-06-04 PROCEDURE — 36556 INSERT NON-TUNNEL CV CATH: CPT | Mod: RT | Performed by: INTERNAL MEDICINE

## 2018-06-04 PROCEDURE — 80048 BASIC METABOLIC PNL TOTAL CA: CPT | Mod: 91

## 2018-06-04 PROCEDURE — 700105 HCHG RX REV CODE 258: Performed by: HOSPITALIST

## 2018-06-04 PROCEDURE — 82962 GLUCOSE BLOOD TEST: CPT | Mod: 91

## 2018-06-04 PROCEDURE — 85025 COMPLETE CBC W/AUTO DIFF WBC: CPT

## 2018-06-04 PROCEDURE — 93306 TTE W/DOPPLER COMPLETE: CPT | Mod: 26 | Performed by: INTERNAL MEDICINE

## 2018-06-04 PROCEDURE — C1751 CATH, INF, PER/CENT/MIDLINE: HCPCS

## 2018-06-04 PROCEDURE — 36556 INSERT NON-TUNNEL CV CATH: CPT

## 2018-06-04 PROCEDURE — 700111 HCHG RX REV CODE 636 W/ 250 OVERRIDE (IP): Performed by: INTERNAL MEDICINE

## 2018-06-04 PROCEDURE — 71045 X-RAY EXAM CHEST 1 VIEW: CPT

## 2018-06-04 PROCEDURE — 700111 HCHG RX REV CODE 636 W/ 250 OVERRIDE (IP): Performed by: HOSPITALIST

## 2018-06-04 PROCEDURE — 700102 HCHG RX REV CODE 250 W/ 637 OVERRIDE(OP): Performed by: INTERNAL MEDICINE

## 2018-06-04 PROCEDURE — B548ZZA ULTRASONOGRAPHY OF SUPERIOR VENA CAVA, GUIDANCE: ICD-10-PCS | Performed by: INTERNAL MEDICINE

## 2018-06-04 PROCEDURE — 770022 HCHG ROOM/CARE - ICU (200)

## 2018-06-04 PROCEDURE — 700105 HCHG RX REV CODE 258: Performed by: PREVENTIVE MEDICINE

## 2018-06-04 PROCEDURE — 700102 HCHG RX REV CODE 250 W/ 637 OVERRIDE(OP): Performed by: HOSPITALIST

## 2018-06-04 PROCEDURE — 82010 KETONE BODYS QUAN: CPT

## 2018-06-04 PROCEDURE — 99233 SBSQ HOSP IP/OBS HIGH 50: CPT | Performed by: HOSPITALIST

## 2018-06-04 PROCEDURE — 99291 CRITICAL CARE FIRST HOUR: CPT | Mod: 25 | Performed by: INTERNAL MEDICINE

## 2018-06-04 PROCEDURE — 02HV33Z INSERTION OF INFUSION DEVICE INTO SUPERIOR VENA CAVA, PERCUTANEOUS APPROACH: ICD-10-PCS | Performed by: INTERNAL MEDICINE

## 2018-06-04 RX ORDER — SODIUM CHLORIDE, SODIUM LACTATE, POTASSIUM CHLORIDE, CALCIUM CHLORIDE 600; 310; 30; 20 MG/100ML; MG/100ML; MG/100ML; MG/100ML
1000 INJECTION, SOLUTION INTRAVENOUS ONCE
Status: COMPLETED | OUTPATIENT
Start: 2018-06-04 | End: 2018-06-04

## 2018-06-04 RX ORDER — PROCHLORPERAZINE 25 MG
25 SUPPOSITORY, RECTAL RECTAL EVERY 12 HOURS PRN
Status: DISCONTINUED | OUTPATIENT
Start: 2018-06-04 | End: 2018-06-11 | Stop reason: HOSPADM

## 2018-06-04 RX ORDER — ONDANSETRON 2 MG/ML
4 INJECTION INTRAMUSCULAR; INTRAVENOUS EVERY 4 HOURS
Status: DISCONTINUED | OUTPATIENT
Start: 2018-06-04 | End: 2018-06-06

## 2018-06-04 RX ORDER — SODIUM CHLORIDE, SODIUM LACTATE, POTASSIUM CHLORIDE, CALCIUM CHLORIDE 600; 310; 30; 20 MG/100ML; MG/100ML; MG/100ML; MG/100ML
INJECTION, SOLUTION INTRAVENOUS CONTINUOUS
Status: DISCONTINUED | OUTPATIENT
Start: 2018-06-04 | End: 2018-06-06

## 2018-06-04 RX ORDER — PROCHLORPERAZINE MALEATE 10 MG
10 TABLET ORAL EVERY 6 HOURS PRN
Status: DISCONTINUED | OUTPATIENT
Start: 2018-06-04 | End: 2018-06-11 | Stop reason: HOSPADM

## 2018-06-04 RX ORDER — INSULIN GLARGINE 100 [IU]/ML
20 INJECTION, SOLUTION SUBCUTANEOUS EVERY EVENING
Status: DISCONTINUED | OUTPATIENT
Start: 2018-06-05 | End: 2018-06-05

## 2018-06-04 RX ORDER — DEXTROSE MONOHYDRATE 25 G/50ML
25 INJECTION, SOLUTION INTRAVENOUS
Status: DISCONTINUED | OUTPATIENT
Start: 2018-06-04 | End: 2018-06-11

## 2018-06-04 RX ORDER — INSULIN GLARGINE 100 [IU]/ML
20 INJECTION, SOLUTION SUBCUTANEOUS ONCE
Status: COMPLETED | OUTPATIENT
Start: 2018-06-04 | End: 2018-06-04

## 2018-06-04 RX ORDER — POTASSIUM CHLORIDE 7.45 MG/ML
10 INJECTION INTRAVENOUS
Status: DISCONTINUED | OUTPATIENT
Start: 2018-06-04 | End: 2018-06-04

## 2018-06-04 RX ADMIN — METOCLOPRAMIDE 10 MG: 5 INJECTION, SOLUTION INTRAMUSCULAR; INTRAVENOUS at 20:47

## 2018-06-04 RX ADMIN — ONDANSETRON 4 MG: 2 INJECTION INTRAMUSCULAR; INTRAVENOUS at 10:21

## 2018-06-04 RX ADMIN — HYDROMORPHONE HYDROCHLORIDE 0.25 MG: 2 INJECTION INTRAMUSCULAR; INTRAVENOUS; SUBCUTANEOUS at 16:59

## 2018-06-04 RX ADMIN — ONDANSETRON HYDROCHLORIDE 4 MG: 2 INJECTION INTRAMUSCULAR; INTRAVENOUS at 00:50

## 2018-06-04 RX ADMIN — INSULIN GLARGINE 20 UNITS: 100 INJECTION, SOLUTION SUBCUTANEOUS at 16:13

## 2018-06-04 RX ADMIN — HYDROMORPHONE HYDROCHLORIDE 0.25 MG: 2 INJECTION INTRAMUSCULAR; INTRAVENOUS; SUBCUTANEOUS at 10:21

## 2018-06-04 RX ADMIN — MAGNESIUM SULFATE IN WATER 2 G: 40 INJECTION, SOLUTION INTRAVENOUS at 01:09

## 2018-06-04 RX ADMIN — METOCLOPRAMIDE 10 MG: 5 INJECTION, SOLUTION INTRAMUSCULAR; INTRAVENOUS at 15:00

## 2018-06-04 RX ADMIN — SODIUM CHLORIDE, POTASSIUM CHLORIDE, SODIUM LACTATE AND CALCIUM CHLORIDE 1000 ML: 600; 310; 30; 20 INJECTION, SOLUTION INTRAVENOUS at 17:00

## 2018-06-04 RX ADMIN — POTASSIUM CHLORIDE 10 MEQ: 7.46 INJECTION, SOLUTION INTRAVENOUS at 07:37

## 2018-06-04 RX ADMIN — ONDANSETRON HYDROCHLORIDE 4 MG: 2 INJECTION INTRAMUSCULAR; INTRAVENOUS at 06:40

## 2018-06-04 RX ADMIN — DEXTROSE MONOHYDRATE 150 ML: 100 INJECTION, SOLUTION INTRAVENOUS at 16:36

## 2018-06-04 RX ADMIN — SODIUM CHLORIDE 3 UNITS/HR: 9 INJECTION, SOLUTION INTRAVENOUS at 09:29

## 2018-06-04 RX ADMIN — ONDANSETRON 4 MG: 2 INJECTION INTRAMUSCULAR; INTRAVENOUS at 21:53

## 2018-06-04 RX ADMIN — POTASSIUM CHLORIDE 10 MEQ: 10 INJECTION, SOLUTION INTRAVENOUS at 00:23

## 2018-06-04 RX ADMIN — SODIUM CHLORIDE, POTASSIUM CHLORIDE, SODIUM LACTATE AND CALCIUM CHLORIDE: 600; 310; 30; 20 INJECTION, SOLUTION INTRAVENOUS at 18:00

## 2018-06-04 RX ADMIN — ENOXAPARIN SODIUM 40 MG: 100 INJECTION SUBCUTANEOUS at 07:55

## 2018-06-04 RX ADMIN — FAMOTIDINE 20 MG: 10 INJECTION, SOLUTION INTRAVENOUS at 23:22

## 2018-06-04 RX ADMIN — INSULIN HUMAN 14 UNITS: 100 INJECTION, SOLUTION PARENTERAL at 20:40

## 2018-06-04 RX ADMIN — HYDROMORPHONE HYDROCHLORIDE 0.25 MG: 2 INJECTION INTRAMUSCULAR; INTRAVENOUS; SUBCUTANEOUS at 19:24

## 2018-06-04 RX ADMIN — POTASSIUM CHLORIDE 10 MEQ: 10 INJECTION, SOLUTION INTRAVENOUS at 01:42

## 2018-06-04 RX ADMIN — HYDROMORPHONE HYDROCHLORIDE 0.25 MG: 2 INJECTION INTRAMUSCULAR; INTRAVENOUS; SUBCUTANEOUS at 06:44

## 2018-06-04 RX ADMIN — METOCLOPRAMIDE 10 MG: 5 INJECTION, SOLUTION INTRAMUSCULAR; INTRAVENOUS at 03:09

## 2018-06-04 RX ADMIN — INSULIN HUMAN 7 UNITS: 100 INJECTION, SOLUTION PARENTERAL at 18:40

## 2018-06-04 RX ADMIN — METOCLOPRAMIDE 10 MG: 5 INJECTION, SOLUTION INTRAMUSCULAR; INTRAVENOUS at 07:55

## 2018-06-04 RX ADMIN — ONDANSETRON 4 MG: 2 INJECTION INTRAMUSCULAR; INTRAVENOUS at 13:16

## 2018-06-04 RX ADMIN — ONDANSETRON 4 MG: 2 INJECTION INTRAMUSCULAR; INTRAVENOUS at 18:00

## 2018-06-04 RX ADMIN — HYDROMORPHONE HYDROCHLORIDE 0.25 MG: 2 INJECTION INTRAMUSCULAR; INTRAVENOUS; SUBCUTANEOUS at 22:33

## 2018-06-04 RX ADMIN — HYDROMORPHONE HYDROCHLORIDE 0.25 MG: 2 INJECTION INTRAMUSCULAR; INTRAVENOUS; SUBCUTANEOUS at 13:16

## 2018-06-04 RX ADMIN — HYDROMORPHONE HYDROCHLORIDE 0.25 MG: 2 INJECTION INTRAMUSCULAR; INTRAVENOUS; SUBCUTANEOUS at 01:00

## 2018-06-04 RX ADMIN — POTASSIUM CHLORIDE 10 MEQ: 7.46 INJECTION, SOLUTION INTRAVENOUS at 10:22

## 2018-06-04 ASSESSMENT — ENCOUNTER SYMPTOMS
NAUSEA: 1
ORTHOPNEA: 0
DIZZINESS: 0
SPUTUM PRODUCTION: 0
BLOOD IN STOOL: 0
PALPITATIONS: 0
CHILLS: 0
COUGH: 0
SHORTNESS OF BREATH: 0
FOCAL WEAKNESS: 0
VOMITING: 1
SENSORY CHANGE: 0
MYALGIAS: 0
STRIDOR: 0
FEVER: 0
ABDOMINAL PAIN: 1
POLYDIPSIA: 1
CONSTIPATION: 0
DIARRHEA: 1

## 2018-06-04 ASSESSMENT — PAIN SCALES - GENERAL
PAINLEVEL_OUTOF10: 8
PAINLEVEL_OUTOF10: 8
PAINLEVEL_OUTOF10: 5
PAINLEVEL_OUTOF10: 4
PAINLEVEL_OUTOF10: 8
PAINLEVEL_OUTOF10: 4
PAINLEVEL_OUTOF10: 8
PAINLEVEL_OUTOF10: 4
PAINLEVEL_OUTOF10: 8
PAINLEVEL_OUTOF10: 8

## 2018-06-04 NOTE — PROGRESS NOTES
Call to charge for u/s guided IV placement due to IV painful with flush and infusion. Fluids stopped and IV pulled.

## 2018-06-04 NOTE — CARE PLAN
Problem: Safety  Goal: Will remain free from injury  Outcome: PROGRESSING AS EXPECTED  Room remains close to the nurses station. Bed remains in the lowest position. Enc'd to call with any needs, pt. Is able to make needs known.

## 2018-06-04 NOTE — PROCEDURES
Procedure Note    Date: 6/4/2018  Time: 2:14 PM    Procedure: Central Venous Line placement  Site: RIJ vein    Indication: difficult IV access, DKA    Consent: Informed consent obtained from patient or designated decision maker after explaining the benefits/risks of the procedure including but not limited to bleeding, infection, nerve or other deep structure injury, pneumothorax/hemothorax, arrythmia, or death. Patient or surrogate expressed understanding and agreement and signed consent which can be found in the patient's chart.    Procedure: After obtaining consent, a time-out was performed. Appropriate site confirmed with ultrasound and patient positioned, prepped, and draped in sterile fashion. All those present wearing cap and mask and those physically participating remained adhering to sterile fashion with cap, mask, gloves, and gown. 5 mL of local anesthetic injected (1% lidocaine without epinephrine) achieving appropriate comfort level for patient. Vein localized and accessed using continuous ultrasound guidance and a 9 Fr triple lumen catheter placed using Seldinger technique. Able to aspirate dark, non-pulsatile blood through each lumen and sterile saline flushed easily before capping. Line secured and dressed in sterile fashion. Patient tolerated procedure well without any difficulties and remains in care of bedside nurse. CXR will be performed to confirm appropriate placement for internal jugular or subclavian CVLs.    EBL: minimal  Complications: none apparent  CXR: pending    Cholo Alan DO  Critical Care Medicine

## 2018-06-04 NOTE — PROGRESS NOTES
Spoke with charge for Nurse to place PIV, Ade CHAVIRA came for PIV placement. Unable to find suitable vein.

## 2018-06-04 NOTE — PROGRESS NOTES
"Pulmonary Critical Care Progress Note      Admission Date: 6/2/2018    Chief Complaint:   DKA    HPI: \"Alis Sparks is a 27 y.o. female with a past medical history of type 1 DM with previous hospitalizations for DKA complicated by gastroparesis who presented to the ER today with complaints of nausea and vomiting since last night.  She notes that she has had elevated blood sugars for the past week.  She notes that the nausea and vomiting is associated with crampy generalized abdominal pain.  She had an episode of watery diarrhea 2 days ago.  She notes that the vomitus is bilious in nature without coffee ground emesis or hematemesis.  No blood in the diarrhea.  She notes that her symptoms are the same from her previous DKA episodes.  She had had lightheadedness without syncope.  No cough, chest pain, recent infections, urinary symptoms, or recent sick contacts.     She was initially admitted to the CDU as her labs were not profoundly consistent with DKA; however, upon further checking and continued nausea/vomiting, the patient went into severe DKA requiring transfer into the ICU with insulin drip infusing.\"    Interval Events:  24 hour interval history reviewed  Reason for visit: DKA   - still on insulin gtt, still vomitting   - Neuro: AOx4   - HR: 90s-110s   - BP: 110s-150s systolic   - GI: NPO, continues to vomit   - UOP: adequate   - Pitt: no   - Tm: afeb   - Lines: PIV   - PPx: GI NPO, DVT lovenox   - RA   - Mag replaced    Yesterday   - oriented x 4   - ST   - DKA   - gastroparesis   - change to D5W   - insulin gtt titrating      Review of Systems   Constitutional: Negative for chills and fever.   Respiratory: Negative for cough, sputum production, shortness of breath and stridor.    Cardiovascular: Negative for chest pain.   Gastrointestinal: Positive for abdominal pain, nausea and vomiting.   Genitourinary: Negative for dysuria.   Musculoskeletal: Negative for myalgias.   Skin: Negative for rash. "   Neurological: Negative for dizziness, sensory change and focal weakness.   Endo/Heme/Allergies: Positive for polydipsia.       PFSH:  No change.      Physical Exam   Constitutional: She is oriented to person, place, and time. She appears unhealthy. She has a sickly appearance.   HENT:   Head: Normocephalic and atraumatic.   Right Ear: External ear normal.   Left Ear: External ear normal.   Nose: Nose normal.   Dry mucous membranes   Eyes: Conjunctivae are normal. Pupils are equal, round, and reactive to light. Right eye exhibits no discharge. Left eye exhibits no discharge. No scleral icterus.   Neck: Normal range of motion. Neck supple. No JVD present. No tracheal deviation present.   Cardiovascular: Regular rhythm and intact distal pulses.  Tachycardia present.    No murmur heard.  Pulmonary/Chest: No stridor. She has no wheezes. She has no rales.   Abdominal: Soft. Bowel sounds are normal. She exhibits no distension. There is tenderness (mild). There is no rebound.   Musculoskeletal: She exhibits no tenderness or deformity.   No clubbing or cyanosis   Neurological: She is alert and oriented to person, place, and time. No cranial nerve deficit. Coordination normal. GCS score is 15.   No focal weakness   Skin: Skin is warm and dry. No rash noted. She is not diaphoretic. No erythema. No pallor.   Nursing note and vitals reviewed.      Respiratory:     Pulse Oximetry: 96 %  CXR images personally reviewed and compared to prior images  CXR with clear lungs    HemoDynamics:  Pulse: (!) 116, Heart Rate (Monitored): (!) 115  NIBP: 125/85       Neuro:    Fluids:  Intake/Output       06/02/18 0700 - 06/03/18 0659 06/03/18 0700 - 06/04/18 0659 06/04/18 0700 - 06/05/18 0659      0729-5193 2261-9016 Total 5139-1060 2067-1585 Total 2218-7055 4082-5773 Total       Intake    P.O.  --  0 0  --  0 0  --  -- --    P.O. -- 0 0 -- 0 0 -- -- --    I.V.  --  5426 5426  567.9  4408.9 4976.8  --  -- --    Magnesium Sulfate Volume -- --  -- -- 50 50 -- -- --    Insulin Volume -- 426 426 267.9 58.9 326.8 -- -- --    IV Piggyback Volume (IV Piggyback) -- 600 600 -- -- -- -- -- --    IV Volume (IV bolus) -- 3000 3000 -- 2000 2000 -- -- --    IV Volume (1/2 NS) -- 900 900 -- -- -- -- -- --    IV Volume (D5 1/2NS) -- 300 300 300 -- 300 -- -- --    IV Volume (KCL) -- 200 200 -- 500 500 -- -- --    IV Volume (D10) -- -- -- -- 1800 1800 -- -- --    Total Intake -- 5426 5426 567.9 4408.9 4976.8 -- -- --       Output    Urine  --  2200 2200  1300  450 1750  --  -- --    Urine Void (mL) (non-catheter) -- 2200 2200 4007 460 4050 -- -- --    Emesis  --  1000 1000  350  500 850  --  -- --    Emesis -- 1000 1000 350 500 850 -- -- --    Total Output -- 3200 3200 4983 020 9824 -- -- --       Net I/O     -- 2226 2226 -1082.1 3458.9 2376.8 -- -- --        Weight: 90.4 kg (199 lb 4.7 oz)  Recent Labs      06/02/18   1024   06/03/18   0155  06/03/18   0300   06/03/18   1808  06/03/18   2224  06/04/18   0552   SODIUM  141   < >  147*  146*   < >  143  139  136   POTASSIUM  3.7   < >  5.1  4.8   < >  3.7  3.7  3.9   CHLORIDE  108   < >  117*  117*   < >  119*  116*  112   CO2  11*   < >  5*  6*   < >  16*  16*  15*   BUN  21   < >  13  12   < >  9  8  6*   CREATININE  1.05   < >  1.07  0.98   < >  0.57  0.50  0.53   MAGNESIUM  1.8   --   2.1   --    --    --   1.5   --    PHOSPHORUS  2.3*   --    --   4.5   --    --    --    --    CALCIUM  10.3   < >  9.0  8.7   < >  8.6  8.4*  8.3*    < > = values in this interval not displayed.       GI/Nutrition:    Liver Function  Recent Labs      06/02/18   1024   06/02/18   1840   06/03/18   1808  06/03/18   2224  06/04/18   0552   ALTSGPT  18   --   13   --    --    --    --    ASTSGOT  15   --   20   --    --    --    --    ALKPHOSPHAT  134*   --   86   --    --    --    --    TBILIRUBIN  0.4   --   0.5   --    --    --    --    LIPASE  10*   --    --    --    --    --    --    GLUCOSE  240*   < >  529*   < >  103*  159*  349*     < > = values in this interval not displayed.       Heme:  Recent Labs      18   1024  18   1840  18   0300   RBC  5.82*  4.56  4.66   HEMOGLOBIN  17.4*  13.8  13.9   HEMATOCRIT  49.4*  41.1  42.1   PLATELETCT  310  200  234       Infectious Disease:  Temp  Av.9 °C (98.4 °F)  Min: 36.2 °C (97.2 °F)  Max: 37.7 °C (99.9 °F)    Recent Labs      18   1024  18   1840  18   0300   WBC  10.2  8.6  13.9*   NEUTSPOLYS  76.00*  70.30  86.00*   LYMPHOCYTES  16.50*  23.40  7.70*   MONOCYTES  6.30  5.00  5.30   EOSINOPHILS  0.40  0.30  0.00   BASOPHILS  0.50  0.50  0.10   ASTSGOT  15  20   --    ALTSGPT  18  13   --    ALKPHOSPHAT  134*  86   --    TBILIRUBIN  0.4  0.5   --      Current Facility-Administered Medications   Medication Dose Frequency Provider Last Rate Last Dose   • potassium chloride (KCL) ivpb 10 mEq  10 mEq Q HOUR Mary Shah M.D.       • metoclopramide (REGLAN) injection 10 mg  10 mg Q6HRS Michael Gayle M.D.   10 mg at 18 0309   • dextrose 10% infusion   Continuous Michael Gayle M.D. 150 mL/hr at 18 2218     • lactated ringers infusion  2,000 mL Continuous Mary Shah M.D. 2,000 mL/hr at 18 1930 2,000 mL at 18 1930   • atorvastatin (LIPITOR) tablet 20 mg  20 mg QHS Pa Urbina M.D.   Stopped at 18 2100   • vitamin D (cholecalciferol) tablet 2,000 Units  2,000 Units DAILY Pa Urbina M.D.   Stopped at 18 1430   • ferrous sulfate tablet 325 mg  325 mg DAILY Pa Urbina M.D.   Stopped at 18 1702   • senna-docusate (PERICOLACE or SENOKOT S) 8.6-50 MG per tablet 2 Tab  2 Tab BID Pa Urbina M.D.   Stopped at 18 2100    And   • polyethylene glycol/lytes (MIRALAX) PACKET 1 Packet  1 Packet QDAY PRN Pa Urbina M.D.        And   • magnesium hydroxide (MILK OF MAGNESIA) suspension 30 mL  30 mL QDAY PRN Pa Urbina M.D.        And   • bisacodyl  (DULCOLAX) suppository 10 mg  10 mg QDAY PRN Pa Urbina M.D.       • enoxaparin (LOVENOX) inj 40 mg  40 mg DAILY Pa Urbina M.D.   40 mg at 06/03/18 0752   • ondansetron (ZOFRAN) syringe/vial injection 4 mg  4 mg Q4HRS PRN Pa Urbina M.D.   4 mg at 06/04/18 0640   • ondansetron (ZOFRAN ODT) dispertab 4 mg  4 mg Q4HRS PRN Pa Urbina M.D.       • acetaminophen (TYLENOL) tablet 650 mg  650 mg Q6HRS PRN Pa Urbina M.D.       • Pharmacy Consult Request ...Pain Management Review   PRN Pa Urbina M.D.        And   • oxyCODONE immediate-release (ROXICODONE) tablet 2.5 mg  2.5 mg Q3HRS PRN Pa Urbina M.D.        And   • oxyCODONE immediate-release (ROXICODONE) tablet 5 mg  5 mg Q3HRS PRN Pa Urbina M.D.   5 mg at 06/02/18 1537    And   • HYDROmorphone (DILAUDID) injection 0.25 mg  0.25 mg Q3HRS PRN Pa Urbina M.D.   0.25 mg at 06/04/18 0644   • labetalol (NORMODYNE,TRANDATE) injection 10 mg  10 mg Q4HRS PRN Pa Urbina M.D.       • glucose 4 g chewable tablet 16 g  16 g Q15 MIN PRN Pa Urbina M.D.        And   • dextrose 50% (D50W) injection 25 mL  25 mL Q15 MIN PRN Pa Urbina M.D.   50 mL at 06/03/18 1417   • MD ALERT-PHARMACY TO CONSULT FOR DKA MONITORING 1 Each  1 Each PRN Pa Urbina M.D.       • potassium phosphates 30 mmol in  mL ivpb  30 mmol Once PRN Pa Urbina M.D.        Or   • sodium phosphate 30 mmol in 1/2  mL ivpb  30 mmol Once PRN Pa Urbina M.D.       • Adult DKA potassium(K+) replacement scale  1 Each Q4HRS Pa Urbina M.D.   1 Each at 06/04/18 0600   • insulin regular human (HUMULIN/NOVOLIN R) 62.5 Units in  mL Infusion for DKA  4 Units/hr Continuous Pa Urbina M.D. 12 mL/hr at 06/04/18 0700 3 Units/hr at 06/04/18 0700     Last reviewed on 6/2/2018 11:56 AM by Blade Darden    Quality  Measures:   Labs reviewed,  Medications reviewed and Radiology images reviewed   Pitt catheter: No Pitt       DVT Prophylaxis: Enoxaparin (Lovenox)   DVT prophylaxis - mechanical: SCDs  Ulcer prophylaxis: Yes          Assessment and Plan:  DKA   - Beta hydroxybutyrate now normal   - metabolic acidosis likely related to vomiting at this point   - Will attempt to transition off of DKA protocol however due to nothing by mouth status will likely require insulin infusion at some point   - Continue every 4 BMPs    Diabetic gastroparesis   - Add Compazine   - Scheduled Reglan, when necessary Zofran   - Symptomatic control of nausea and vomiting   - NPO, LR for IVF    DM type I   - Attempt to transition to basal and sliding scale insulin    Esophagitis   - As seen on CT   - pepcid    This patient continues to be acidotic with an anion gap requiring adjustment of crystalloid and insulin drip, electrolyte replacement continues to be aggressively titrated. This patient is critically ill, at high risk for decompensation leading to worsening vital organ dysfunction and death without critical care interventions.    Discussed patient condition and risk of morbidity and/or mortality with RN, RT, Pharmacy, , , Charge nurse / hot rounds, Patient and hospitalist.      The patient remains critically ill.  Critical care time = 38 minutes in directly providing and coordinating critical care and extensive data review.  No time overlap and excludes procedures

## 2018-06-04 NOTE — CARE PLAN
Problem: Infection  Goal: Will remain free from infection  Outcome: PROGRESSING AS EXPECTED  Continue to monitor V.S. And labs. Continuing to monitor for any s/sx of infection.

## 2018-06-04 NOTE — PROGRESS NOTES
0603/18 1930: Spoke with Dr. Shah due to BG=94. New orders to decrease insulin drip to 1 unit/hr and give a 2L LR bolus x1 now.     06/04/18 0005: Notified Dr. Shah of pt.'s most recent chemistry panel. Decided to keep pt on the DKA protocol, O.K. to skip 0200 chemistry draw and just do the 0600 draw. If the 0600 chemistry shows a CO2 of 16, we may consider transitioning pt. Off insulin drip. Pt. With less nausea and vomiting. V.S.S. BG levels stable.    06/04/18 0515: BN=173, notified Elizabeth in pharmacy. Elizabeth was able to talk with Dr. Shah. New orders given to increase insulin drip to 3units/hr and Keep D10 infusing at 150cc/hr. Continue on DKa protocol.       06/04/18 0530: Pt. Difficult lab draw and IV start. Lab at beside, was only able to draw enough blood for a BMP.  states she will send another  later in the AM. Notified Dr. Shah earlier in the AM regarding pt.'s difficulty drawing labs and starting IV's. Pt. Possible candidate for PICC line.

## 2018-06-04 NOTE — PROGRESS NOTES
Spoke with Pharmacy about drop in blood glucose since D10 has been off due to infiltrate. Insulin has been stopped until CVL placed

## 2018-06-05 LAB
ANION GAP SERPL CALC-SCNC: 12 MMOL/L (ref 0–11.9)
ANION GAP SERPL CALC-SCNC: 17 MMOL/L (ref 0–11.9)
B-OH-BUTYR SERPL-MCNC: 3.04 MMOL/L (ref 0.02–0.27)
BUN SERPL-MCNC: 5 MG/DL (ref 8–22)
BUN SERPL-MCNC: 5 MG/DL (ref 8–22)
CALCIUM SERPL-MCNC: 8.5 MG/DL (ref 8.5–10.5)
CALCIUM SERPL-MCNC: 8.6 MG/DL (ref 8.5–10.5)
CHLORIDE SERPL-SCNC: 107 MMOL/L (ref 96–112)
CHLORIDE SERPL-SCNC: 107 MMOL/L (ref 96–112)
CO2 SERPL-SCNC: 18 MMOL/L (ref 20–33)
CO2 SERPL-SCNC: 22 MMOL/L (ref 20–33)
CREAT SERPL-MCNC: 0.46 MG/DL (ref 0.5–1.4)
CREAT SERPL-MCNC: 0.62 MG/DL (ref 0.5–1.4)
EKG IMPRESSION: NORMAL
GLUCOSE BLD-MCNC: 139 MG/DL (ref 65–99)
GLUCOSE BLD-MCNC: 251 MG/DL (ref 65–99)
GLUCOSE BLD-MCNC: 260 MG/DL (ref 65–99)
GLUCOSE BLD-MCNC: 314 MG/DL (ref 65–99)
GLUCOSE SERPL-MCNC: 164 MG/DL (ref 65–99)
GLUCOSE SERPL-MCNC: 197 MG/DL (ref 65–99)
PHOSPHATE SERPL-MCNC: 2.5 MG/DL (ref 2.5–4.5)
POTASSIUM SERPL-SCNC: 3.2 MMOL/L (ref 3.6–5.5)
POTASSIUM SERPL-SCNC: 3.3 MMOL/L (ref 3.6–5.5)
SODIUM SERPL-SCNC: 141 MMOL/L (ref 135–145)
SODIUM SERPL-SCNC: 142 MMOL/L (ref 135–145)

## 2018-06-05 PROCEDURE — 770022 HCHG ROOM/CARE - ICU (200)

## 2018-06-05 PROCEDURE — 700102 HCHG RX REV CODE 250 W/ 637 OVERRIDE(OP): Performed by: INTERNAL MEDICINE

## 2018-06-05 PROCEDURE — 93005 ELECTROCARDIOGRAM TRACING: CPT | Performed by: INTERNAL MEDICINE

## 2018-06-05 PROCEDURE — 700111 HCHG RX REV CODE 636 W/ 250 OVERRIDE (IP): Performed by: HOSPITALIST

## 2018-06-05 PROCEDURE — 700102 HCHG RX REV CODE 250 W/ 637 OVERRIDE(OP): Performed by: HOSPITALIST

## 2018-06-05 PROCEDURE — 700105 HCHG RX REV CODE 258: Performed by: INTERNAL MEDICINE

## 2018-06-05 PROCEDURE — 700111 HCHG RX REV CODE 636 W/ 250 OVERRIDE (IP): Performed by: INTERNAL MEDICINE

## 2018-06-05 PROCEDURE — 93010 ELECTROCARDIOGRAM REPORT: CPT | Performed by: INTERNAL MEDICINE

## 2018-06-05 PROCEDURE — 84100 ASSAY OF PHOSPHORUS: CPT

## 2018-06-05 PROCEDURE — A9270 NON-COVERED ITEM OR SERVICE: HCPCS | Performed by: HOSPITALIST

## 2018-06-05 PROCEDURE — 80048 BASIC METABOLIC PNL TOTAL CA: CPT

## 2018-06-05 PROCEDURE — 99233 SBSQ HOSP IP/OBS HIGH 50: CPT | Performed by: HOSPITALIST

## 2018-06-05 PROCEDURE — 82010 KETONE BODYS QUAN: CPT

## 2018-06-05 PROCEDURE — 82962 GLUCOSE BLOOD TEST: CPT | Mod: 91

## 2018-06-05 PROCEDURE — 99233 SBSQ HOSP IP/OBS HIGH 50: CPT | Performed by: INTERNAL MEDICINE

## 2018-06-05 PROCEDURE — A9270 NON-COVERED ITEM OR SERVICE: HCPCS | Performed by: INTERNAL MEDICINE

## 2018-06-05 RX ORDER — TRAMADOL HYDROCHLORIDE 50 MG/1
50-100 TABLET ORAL EVERY 6 HOURS PRN
Status: DISCONTINUED | OUTPATIENT
Start: 2018-06-05 | End: 2018-06-11 | Stop reason: HOSPADM

## 2018-06-05 RX ORDER — DIPHENHYDRAMINE HYDROCHLORIDE 50 MG/ML
25 INJECTION INTRAMUSCULAR; INTRAVENOUS EVERY 6 HOURS
Status: COMPLETED | OUTPATIENT
Start: 2018-06-06 | End: 2018-06-07

## 2018-06-05 RX ORDER — KETOROLAC TROMETHAMINE 30 MG/ML
15 INJECTION, SOLUTION INTRAMUSCULAR; INTRAVENOUS EVERY 6 HOURS PRN
Status: DISPENSED | OUTPATIENT
Start: 2018-06-05 | End: 2018-06-10

## 2018-06-05 RX ORDER — SUCRALFATE ORAL 1 G/10ML
1 SUSPENSION ORAL EVERY 6 HOURS
Status: DISCONTINUED | OUTPATIENT
Start: 2018-06-05 | End: 2018-06-11 | Stop reason: HOSPADM

## 2018-06-05 RX ORDER — INSULIN GLARGINE 100 [IU]/ML
40 INJECTION, SOLUTION SUBCUTANEOUS EVERY EVENING
Status: DISCONTINUED | OUTPATIENT
Start: 2018-06-05 | End: 2018-06-11 | Stop reason: HOSPADM

## 2018-06-05 RX ORDER — POTASSIUM CHLORIDE 29.8 MG/ML
40 INJECTION INTRAVENOUS ONCE
Status: COMPLETED | OUTPATIENT
Start: 2018-06-05 | End: 2018-06-05

## 2018-06-05 RX ADMIN — ONDANSETRON 4 MG: 2 INJECTION INTRAMUSCULAR; INTRAVENOUS at 13:41

## 2018-06-05 RX ADMIN — POTASSIUM CHLORIDE 40 MEQ: 400 INJECTION, SOLUTION INTRAVENOUS at 12:28

## 2018-06-05 RX ADMIN — ONDANSETRON 4 MG: 2 INJECTION INTRAMUSCULAR; INTRAVENOUS at 17:45

## 2018-06-05 RX ADMIN — METOCLOPRAMIDE 10 MG: 5 INJECTION, SOLUTION INTRAMUSCULAR; INTRAVENOUS at 21:14

## 2018-06-05 RX ADMIN — ONDANSETRON 4 MG: 2 INJECTION INTRAMUSCULAR; INTRAVENOUS at 01:40

## 2018-06-05 RX ADMIN — METOCLOPRAMIDE 10 MG: 5 INJECTION, SOLUTION INTRAMUSCULAR; INTRAVENOUS at 03:04

## 2018-06-05 RX ADMIN — METOCLOPRAMIDE 10 MG: 5 INJECTION, SOLUTION INTRAMUSCULAR; INTRAVENOUS at 09:10

## 2018-06-05 RX ADMIN — SUCRALFATE 1 G: 1 SUSPENSION ORAL at 12:05

## 2018-06-05 RX ADMIN — KETOROLAC TROMETHAMINE 15 MG: 30 INJECTION, SOLUTION INTRAMUSCULAR at 09:22

## 2018-06-05 RX ADMIN — ENOXAPARIN SODIUM 40 MG: 100 INJECTION SUBCUTANEOUS at 09:09

## 2018-06-05 RX ADMIN — ONDANSETRON 4 MG: 2 INJECTION INTRAMUSCULAR; INTRAVENOUS at 05:56

## 2018-06-05 RX ADMIN — ONDANSETRON 4 MG: 2 INJECTION INTRAMUSCULAR; INTRAVENOUS at 09:23

## 2018-06-05 RX ADMIN — SUCRALFATE 1 G: 1 SUSPENSION ORAL at 17:45

## 2018-06-05 RX ADMIN — PROCHLORPERAZINE EDISYLATE 10 MG: 5 INJECTION INTRAMUSCULAR; INTRAVENOUS at 20:00

## 2018-06-05 RX ADMIN — KETOROLAC TROMETHAMINE 15 MG: 30 INJECTION, SOLUTION INTRAMUSCULAR at 22:37

## 2018-06-05 RX ADMIN — SODIUM CHLORIDE, POTASSIUM CHLORIDE, SODIUM LACTATE AND CALCIUM CHLORIDE: 600; 310; 30; 20 INJECTION, SOLUTION INTRAVENOUS at 14:20

## 2018-06-05 RX ADMIN — LIDOCAINE HYDROCHLORIDE 15 ML: 20 SOLUTION OROPHARYNGEAL at 12:05

## 2018-06-05 RX ADMIN — SODIUM CHLORIDE, POTASSIUM CHLORIDE, SODIUM LACTATE AND CALCIUM CHLORIDE: 600; 310; 30; 20 INJECTION, SOLUTION INTRAVENOUS at 21:14

## 2018-06-05 RX ADMIN — METOCLOPRAMIDE 10 MG: 5 INJECTION, SOLUTION INTRAMUSCULAR; INTRAVENOUS at 14:55

## 2018-06-05 RX ADMIN — FAMOTIDINE 20 MG: 10 INJECTION, SOLUTION INTRAVENOUS at 20:00

## 2018-06-05 RX ADMIN — KETOROLAC TROMETHAMINE 15 MG: 30 INJECTION, SOLUTION INTRAMUSCULAR at 14:56

## 2018-06-05 RX ADMIN — HYDROMORPHONE HYDROCHLORIDE 0.25 MG: 2 INJECTION INTRAMUSCULAR; INTRAVENOUS; SUBCUTANEOUS at 04:59

## 2018-06-05 RX ADMIN — HYDROMORPHONE HYDROCHLORIDE 0.25 MG: 2 INJECTION INTRAMUSCULAR; INTRAVENOUS; SUBCUTANEOUS at 01:38

## 2018-06-05 RX ADMIN — INSULIN HUMAN 7 UNITS: 100 INJECTION, SOLUTION PARENTERAL at 17:45

## 2018-06-05 RX ADMIN — PROCHLORPERAZINE EDISYLATE 10 MG: 5 INJECTION INTRAMUSCULAR; INTRAVENOUS at 12:05

## 2018-06-05 RX ADMIN — ONDANSETRON 4 MG: 2 INJECTION INTRAMUSCULAR; INTRAVENOUS at 21:17

## 2018-06-05 RX ADMIN — INSULIN HUMAN 10 UNITS: 100 INJECTION, SOLUTION PARENTERAL at 06:33

## 2018-06-05 RX ADMIN — FAMOTIDINE 20 MG: 10 INJECTION, SOLUTION INTRAVENOUS at 09:10

## 2018-06-05 RX ADMIN — INSULIN GLARGINE 40 UNITS: 100 INJECTION, SOLUTION SUBCUTANEOUS at 17:45

## 2018-06-05 RX ADMIN — INSULIN HUMAN 7 UNITS: 100 INJECTION, SOLUTION PARENTERAL at 12:04

## 2018-06-05 RX ADMIN — TRAMADOL HYDROCHLORIDE 50 MG: 50 TABLET, COATED ORAL at 16:01

## 2018-06-05 ASSESSMENT — ENCOUNTER SYMPTOMS
STRIDOR: 0
ABDOMINAL PAIN: 1
CONSTIPATION: 0
MYALGIAS: 0
CHILLS: 0
HEARTBURN: 0
SPUTUM PRODUCTION: 0
SHORTNESS OF BREATH: 0
BLOOD IN STOOL: 0
COUGH: 0
DIZZINESS: 0
FOCAL WEAKNESS: 0
NAUSEA: 1
PALPITATIONS: 0
FEVER: 0
ORTHOPNEA: 0
VOMITING: 1
SENSORY CHANGE: 0

## 2018-06-05 ASSESSMENT — PAIN SCALES - GENERAL
PAINLEVEL_OUTOF10: 5
PAINLEVEL_OUTOF10: 6
PAINLEVEL_OUTOF10: 3
PAINLEVEL_OUTOF10: 7
PAINLEVEL_OUTOF10: 6
PAINLEVEL_OUTOF10: 7
PAINLEVEL_OUTOF10: 8
PAINLEVEL_OUTOF10: 6
PAINLEVEL_OUTOF10: 5

## 2018-06-05 ASSESSMENT — PATIENT HEALTH QUESTIONNAIRE - PHQ9
2. FEELING DOWN, DEPRESSED, IRRITABLE, OR HOPELESS: NOT AT ALL
SUM OF ALL RESPONSES TO PHQ9 QUESTIONS 1 AND 2: 0
1. LITTLE INTEREST OR PLEASURE IN DOING THINGS: NOT AT ALL

## 2018-06-05 NOTE — PROGRESS NOTES
"Pulmonary Critical Care Progress Note      Admission Date: 6/2/2018    Chief Complaint:   DKA    HPI: \"Alis Sparks is a 27 y.o. female with a past medical history of type 1 DM with previous hospitalizations for DKA complicated by gastroparesis who presented to the ER today with complaints of nausea and vomiting since last night.  She notes that she has had elevated blood sugars for the past week.  She notes that the nausea and vomiting is associated with crampy generalized abdominal pain.  She had an episode of watery diarrhea 2 days ago.  She notes that the vomitus is bilious in nature without coffee ground emesis or hematemesis.  No blood in the diarrhea.  She notes that her symptoms are the same from her previous DKA episodes.  She had had lightheadedness without syncope.  No cough, chest pain, recent infections, urinary symptoms, or recent sick contacts.     She was initially admitted to the CDU as her labs were not profoundly consistent with DKA; however, upon further checking and continued nausea/vomiting, the patient went into severe DKA requiring transfer into the ICU with insulin drip infusing.\"    Interval Events:  24 hour interval history reviewed  Reason for visit: DKA   - continues to vomit overnight   - Neuro: AOx4   - HR: ST 110s-140s   - BP: 130s-140s   - GI: ADA diet, emesis   - UOP: adequate   - Pitt: no   - Tm: afeb   - Lines: CVC   - PPx: GI NPO, pepcid, DVT lovenox   - CO2 improved    Yesterday   - still on insulin gtt, still vomitting   - Neuro: AOx4   - HR: 90s-110s   - BP: 110s-150s systolic   - GI: NPO, continues to vomit   - UOP: adequate   - Pitt: no   - Tm: afeb   - Lines: PIV   - PPx: GI NPO, DVT lovenox   - RA   - Mag replaced    Review of Systems   Constitutional: Negative for chills and fever.   Respiratory: Negative for cough, sputum production, shortness of breath and stridor.    Cardiovascular: Negative for chest pain.   Gastrointestinal: Positive for abdominal pain, " nausea and vomiting.   Genitourinary: Negative for dysuria.   Musculoskeletal: Negative for myalgias.   Skin: Negative for rash.   Neurological: Negative for dizziness, sensory change and focal weakness.       PFSH:  No change.      Physical Exam   Constitutional: She is oriented to person, place, and time. She appears unhealthy. She has a sickly appearance.   HENT:   Head: Normocephalic and atraumatic.   Right Ear: External ear normal.   Left Ear: External ear normal.   Nose: Nose normal.   Dry mucous membranes   Eyes: Conjunctivae are normal. Pupils are equal, round, and reactive to light. Right eye exhibits no discharge. Left eye exhibits no discharge. No scleral icterus.   Neck: Normal range of motion. Neck supple. No JVD present. No tracheal deviation present.   Cardiovascular: Regular rhythm and intact distal pulses.  Tachycardia present.    No murmur heard.  Pulmonary/Chest: No stridor. She has no wheezes. She has no rales.   Abdominal: Soft. Bowel sounds are normal. She exhibits no distension. There is tenderness (mild, difuse - mostly epigastric). There is no rebound.   Musculoskeletal: She exhibits no tenderness or deformity.   No clubbing or cyanosis   Neurological: She is alert and oriented to person, place, and time. No cranial nerve deficit. Coordination normal. GCS score is 15.   No focal weakness   Skin: Skin is warm and dry. No rash noted. She is not diaphoretic. No erythema. No pallor.   Nursing note and vitals reviewed.      Respiratory:     Pulse Oximetry: 92 %  CXR images personally reviewed and compared to prior images    HemoDynamics:  Pulse: (!) 117, Heart Rate (Monitored): (!) 115  NIBP: 141/83       Neuro:    Fluids:  Intake/Output       06/03/18 0700 - 06/04/18 0659 06/04/18 0700 - 06/05/18 0659 06/05/18 0700 - 06/06/18 0659      3485-6538 8397-5761 Total 2028-8219 3501-2794 Total 2779-1402 6316-7762 Total       Intake    P.O.  --  0 0  --  0 0  --  -- --    P.O. -- 0 0 -- 0 0 -- -- --     I.V.  567.9  4408.9 4976.8  2400  1800 4200  --  -- --    Magnesium Sulfate Volume -- 50 50 -- -- -- -- -- --    Insulin Volume 267.9 58.9 326.8 -- -- -- -- -- --    IV Volume (IV bolus) -- 2000 2000 1000 -- 1000 -- -- --    IV Volume (D5 1/2NS) 300 -- 300 -- -- -- -- -- --    IV Volume (KCL) -- 500 500 200 -- 200 -- -- --    IV Volume (D10) -- 1800 1800 1200 -- 1200 -- -- --    IV Volume (LR) -- -- -- -- 1800 1800 -- -- --    Total Intake 567.9 4408.9 4976.8 2400 1800 4200 -- -- --       Output    Urine  1300  450 1750  800  500 1300  --  -- --    Number of Times Voided -- -- -- 1 x 1 x 2 x -- -- --    Urine Void (mL) (non-catheter) 4365 156 8004  -- -- --    Emesis  350  500 850  1025  700 1725  --  -- --    Emesis 350  700 1725 -- -- --    Stool  --  -- --  --  -- --  --  -- --    Number of Times Stooled -- -- -- 0 x 0 x 0 x -- -- --    Total Output 9841 897 7557 1825 1200 3025 -- -- --       Net I/O     -1082.1 3458.9 2376.8  -- -- --        Weight: 89.3 kg (196 lb 13.9 oz)  Recent Labs      06/02/18   1024   06/03/18   0155  06/03/18   0300   06/03/18   2224   06/04/18 2016 06/04/18   2330  06/05/18   0338   SODIUM  141   < >  147*  146*   < >  139   < >  136  142  141   POTASSIUM  3.7   < >  5.1  4.8   < >  3.7   < >  3.5*  3.2*  3.3*   CHLORIDE  108   < >  117*  117*   < >  116*   < >  103  107  107   CO2  11*   < >  5*  6*   < >  16*   < >  17*  18*  22   BUN  21   < >  13  12   < >  8   < >  5*  5*  5*   CREATININE  1.05   < >  1.07  0.98   < >  0.50   < >  0.38*  0.62  0.46*   MAGNESIUM  1.8   --   2.1   --    --   1.5   --    --    --    --    PHOSPHORUS  2.3*   --    --   4.5   --    --    --    --    --   2.5   CALCIUM  10.3   < >  9.0  8.7   < >  8.4*   < >  8.5  8.6  8.5    < > = values in this interval not displayed.       GI/Nutrition:    Liver Function  Recent Labs      06/02/18   1024   06/02/18   1840   06/04/18 2016 06/04/18   2330  06/05/18   0338    ALTSGPT  18   --   13   --    --    --    --    ASTSGOT  15   --   20   --    --    --    --    ALKPHOSPHAT  134*   --   86   --    --    --    --    TBILIRUBIN  0.4   --   0.5   --    --    --    --    LIPASE  10*   --    --    --    --    --    --    GLUCOSE  240*   < >  529*   < >  426*  164*  197*    < > = values in this interval not displayed.       Heme:  Recent Labs      18   1840  18   0300  18   1027   RBC  4.56  4.66  4.36   HEMOGLOBIN  13.8  13.9  12.6   HEMATOCRIT  41.1  42.1  37.7   PLATELETCT  200  234  166       Infectious Disease:  Temp  Av.4 °C (97.6 °F)  Min: 36.1 °C (96.9 °F)  Max: 37.3 °C (99.1 °F)    Recent Labs      18   1024  18   1840  18   0300  18   1027   WBC  10.2  8.6  13.9*  7.6   NEUTSPOLYS  76.00*  70.30  86.00*  63.10   LYMPHOCYTES  16.50*  23.40  7.70*  25.40   MONOCYTES  6.30  5.00  5.30  9.30   EOSINOPHILS  0.40  0.30  0.00  1.20   BASOPHILS  0.50  0.50  0.10  0.70   ASTSGOT  15  20   --    --    ALTSGPT  18  13   --    --    ALKPHOSPHAT  134*  86   --    --    TBILIRUBIN  0.4  0.5   --    --      Current Facility-Administered Medications   Medication Dose Frequency Provider Last Rate Last Dose   • prochlorperazine (COMPAZINE) tablet 10 mg  10 mg Q6HRS PRN Cholo Alan Jr., D.O.       • ondansetron (ZOFRAN) syringe/vial injection 4 mg  4 mg Q4HRS Cholo Alan Jr., D.O.   4 mg at 18 0556   • prochlorperazine (COMPAZINE) injection 10 mg  10 mg Q6HRS PRN George Benítez M.D.       • insulin regular (HUMULIN R) injection 3-14 Units  3-14 Units 4X/DAY ACHS Cholo Alan Jr., D.O.   10 Units at 18 0633    And   • glucose 4 g chewable tablet 16 g  16 g Q15 MIN PRN Cholo Alan Jr., D.O.        And   • dextrose 50% (D50W) injection 25 mL  25 mL Q15 MIN PRN Cholo Alan Jr., D.O.       • prochlorperazine (COMPAZINE) suppository 25 mg  25 mg Q12HRS PRN Cholo Alan Jr., D.O.       • insulin glargine (LANTUS)  injection 20 Units  20 Units Q EVENING Cholo Aaln Jr., D.O.       • lactated ringers infusion   Continuous Cholo Alan Jr., D.O. 125 mL/hr at 06/04/18 1800     • famotidine (PEPCID) injection 20 mg  20 mg BID Cholo Alan Jr., D.O.   20 mg at 06/04/18 2322   • metoclopramide (REGLAN) injection 10 mg  10 mg Q6HRS Michael Gayle M.D.   10 mg at 06/05/18 0304   • atorvastatin (LIPITOR) tablet 20 mg  20 mg QHS Pa Urbina M.D.   Stopped at 06/02/18 2100   • vitamin D (cholecalciferol) tablet 2,000 Units  2,000 Units DAILY Pa Urbina M.D.   Stopped at 06/02/18 1430   • ferrous sulfate tablet 325 mg  325 mg DAILY Pa Urbina M.D.   Stopped at 06/02/18 1702   • senna-docusate (PERICOLACE or SENOKOT S) 8.6-50 MG per tablet 2 Tab  2 Tab BID Pa Urbina M.D.   Stopped at 06/02/18 2100    And   • polyethylene glycol/lytes (MIRALAX) PACKET 1 Packet  1 Packet QDAY PRN Pa Urbina M.D.        And   • magnesium hydroxide (MILK OF MAGNESIA) suspension 30 mL  30 mL QDAY PRN Pa Urbina M.D.        And   • bisacodyl (DULCOLAX) suppository 10 mg  10 mg QDAY PRN Pa Urbina M.D.       • enoxaparin (LOVENOX) inj 40 mg  40 mg DAILY Pa Urbina M.D.   40 mg at 06/04/18 0755   • ondansetron (ZOFRAN ODT) dispertab 4 mg  4 mg Q4HRS PRN Pa Urbina M.D.       • acetaminophen (TYLENOL) tablet 650 mg  650 mg Q6HRS PRN Pa Urbina M.D.       • Pharmacy Consult Request ...Pain Management Review   PRN Pa Urbina M.D.        And   • oxyCODONE immediate-release (ROXICODONE) tablet 2.5 mg  2.5 mg Q3HRS PRN Pa Urbina M.D.        And   • oxyCODONE immediate-release (ROXICODONE) tablet 5 mg  5 mg Q3HRS PRN Pa Urbina M.D.   5 mg at 06/02/18 1537    And   • HYDROmorphone (DILAUDID) injection 0.25 mg  0.25 mg Q3HRS PRN Pa Urbina M.D.   0.25 mg at 06/05/18 0457   • labetalol (NORMODYNE,TRANDATE) injection  10 mg  10 mg Q4HRS PRN Pa Urbina M.D.         Last reviewed on 6/2/2018 11:56 AM by Blade Darden    Quality  Measures:  Labs reviewed, Medications reviewed and Radiology images reviewed  Pitt catheter: No Pitt      DVT Prophylaxis: Enoxaparin (Lovenox)  DVT prophylaxis - mechanical: SCDs  Ulcer prophylaxis: Yes          Assessment and Plan:  DKA   - basal insulin, sliding scale insulin, accuchecks   - hypoglycemia protocol    Diabetic gastroparesis   - refractory   - attempt benadryl and erythromycin   - continue Compazine, reglan   - when necessary Zofran   - Symptomatic control of nausea and vomiting   - NPO, LR for IVF   - will likely need a GI consult for assistance with management    DM type I   - Attempt to transition to basal and sliding scale insulin    Esophagitis   - As seen on CT   - pepcid, carafate, GI cocktail x1    Hypokalemia   - replete today    Discussed patient condition and risk of morbidity and/or mortality with RN, RT, Pharmacy, , , Charge nurse / hot rounds, Patient and hospitalist.

## 2018-06-05 NOTE — PROGRESS NOTES
[facility] Hospitalist Progress Note    Date of Service: 2018    Chief Complaint  27 y.o. female admitted 2018 with nausea/vomiting and high blood sugars.    Ms Sparks has history of type I diabetes and frequent admissions for diabetic ketoacidosis. Diabetes complicated by gastroparesis.    Interval Problem Update  Emesis, minimal relief with multiple anti-emetics  No BM since admission  Complains of abdominal muscle pain due to frequent emesis  On LR at 150  Blood sugars 151-314, require 38 units of sliding scale    Consultants/Specialty  Critical care, I discussed the patient's condition with Dr. Alan today on ICU rounds    Disposition  Keep in ICU for now, high nursing care needs for intractable N/V      Review of Systems   Cardiovascular: Negative for chest pain, palpitations and orthopnea.   Gastrointestinal: Positive for abdominal pain, nausea and vomiting. Negative for blood in stool, constipation and heartburn.   Genitourinary: Negative for dysuria and urgency.   Skin: Negative for itching and rash.      Physical Exam  Laboratory/Imaging   Hemodynamics  Temp (24hrs), Av.4 °C (97.5 °F), Min:36.1 °C (96.9 °F), Max:37.3 °C (99.1 °F)   Temperature: 36.2 °C (97.1 °F)  Pulse  Av.5  Min: 88  Max: 160 Heart Rate (Monitored): (!) 108  NIBP: 144/88      Respiratory      Respiration: (!) 7, Pulse Oximetry: 95 %        RUL Breath Sounds: Clear, RML Breath Sounds: Clear, RLL Breath Sounds: Diminished, NANCY Breath Sounds: Clear, LLL Breath Sounds: Diminished    Fluids    Intake/Output Summary (Last 24 hours) at 18 0847  Last data filed at 18 0800   Gross per 24 hour   Intake             4100 ml   Output             2700 ml   Net             1400 ml       Nutrition  Orders Placed This Encounter   Procedures   • DIET ORDER     Standing Status:   Standing     Number of Occurrences:   1     Order Specific Question:   Diet:     Answer:   Diabetic [3]     Physical Exam   Constitutional: She is  oriented to person, place, and time. She appears well-developed and well-nourished. She appears distressed.   HENT:   Head: Normocephalic and atraumatic.   Eyes: Conjunctivae and EOM are normal.   Neck: Normal range of motion. Neck supple.   Cardiovascular: Normal rate, regular rhythm and intact distal pulses.    No murmur heard.  2+ Radial Pulses  Brisk Capillary Refill   Pulmonary/Chest: Effort normal and breath sounds normal. No respiratory distress. She has no wheezes.   Abdominal: Soft. Bowel sounds are normal. She exhibits no distension. There is tenderness. There is no rebound.   Musculoskeletal: Normal range of motion. She exhibits no edema.   Neurological: She is alert and oriented to person, place, and time. No cranial nerve deficit.   Skin: Skin is warm and dry. She is not diaphoretic. No erythema.   Skin is warm and well perfused   Psychiatric: She has a normal mood and affect.       Recent Labs      06/02/18   1840  06/03/18   0300  06/04/18   1027   WBC  8.6  13.9*  7.6   RBC  4.56  4.66  4.36   HEMOGLOBIN  13.8  13.9  12.6   HEMATOCRIT  41.1  42.1  37.7   MCV  89.7  90.3  86.5   MCH  29.6  29.8  28.9   MCHC  33.0*  33.0*  33.4*   RDW  45.1  46.9  42.5   PLATELETCT  200  234  166   MPV  10.5  10.6  11.0     Recent Labs      06/04/18   2016  06/04/18   2330  06/05/18   0338   SODIUM  136  142  141   POTASSIUM  3.5*  3.2*  3.3*   CHLORIDE  103  107  107   CO2  17*  18*  22   GLUCOSE  426*  164*  197*   BUN  5*  5*  5*   CREATININE  0.38*  0.62  0.46*   CALCIUM  8.5  8.6  8.5                      Assessment/Plan     * Diabetic ketoacidosis (HCC)- (present on admission)   Assessment & Plan    Acidosis is resolved  Lantus and sliding scale          Type 1 diabetes mellitus (HCC)- (present on admission)   Assessment & Plan    Uncontrolled, HbA1c 12.5  Insulin and sliding scale  Encourage compliance with insulin        Gastroparesis due to DM (HCC)- (present on admission)   Assessment & Plan    Says that she  has previously had confirmed gastroparesis diagnosis by EGD  She has intractable N/V, slow to resolve  Stop IV pain meds, add haldol  Continue zofran and reglan  Monitor on telemetry with above multiple agents        Hypophosphatemia- (present on admission)   Assessment & Plan    Replete        Hypocalcemia- (present on admission)   Assessment & Plan    Replete          Quality-Core Measures   Reviewed items::  Labs reviewed and Medications reviewed  Pitt catheter::  No Pitt  DVT prophylaxis pharmacological::  Enoxaparin (Lovenox)

## 2018-06-05 NOTE — CARE PLAN
Problem: Bowel/Gastric:  Goal: Will not experience complications related to bowel motility  Nausea/ vomiting will decrease.    Problem: Pain Management  Goal: Pain level will decrease to patient's comfort goal  Patient will report tolerable pain level, usage of PRN pain medications and non pharmacologic strategies.

## 2018-06-05 NOTE — CARE PLAN
Problem: Communication  Goal: The ability to communicate needs accurately and effectively will improve  Outcome: PROGRESSING AS EXPECTED  Encourage patient to voice concerns over pain and discomfort. Encourage pt to ask questions about disease process.     Problem: Pain Management  Goal: Pain level will decrease to patient's comfort goal  Outcome: PROGRESSING SLOWER THAN EXPECTED  Assess pain q2 hours and PRN, medicate patient per MAR.

## 2018-06-06 LAB
ALBUMIN SERPL BCP-MCNC: 2.9 G/DL (ref 3.2–4.9)
ALBUMIN/GLOB SERPL: 1.3 G/DL
ALP SERPL-CCNC: 83 U/L (ref 30–99)
ALT SERPL-CCNC: 18 U/L (ref 2–50)
ANION GAP SERPL CALC-SCNC: 9 MMOL/L (ref 0–11.9)
AST SERPL-CCNC: 24 U/L (ref 12–45)
BASOPHILS # BLD AUTO: 0.4 % (ref 0–1.8)
BASOPHILS # BLD: 0.02 K/UL (ref 0–0.12)
BILIRUB SERPL-MCNC: 0.5 MG/DL (ref 0.1–1.5)
BUN SERPL-MCNC: 6 MG/DL (ref 8–22)
CALCIUM SERPL-MCNC: 8.2 MG/DL (ref 8.5–10.5)
CHLORIDE SERPL-SCNC: 103 MMOL/L (ref 96–112)
CO2 SERPL-SCNC: 26 MMOL/L (ref 20–33)
CREAT SERPL-MCNC: 0.41 MG/DL (ref 0.5–1.4)
EOSINOPHIL # BLD AUTO: 0.05 K/UL (ref 0–0.51)
EOSINOPHIL NFR BLD: 0.9 % (ref 0–6.9)
ERYTHROCYTE [DISTWIDTH] IN BLOOD BY AUTOMATED COUNT: 41.1 FL (ref 35.9–50)
GLOBULIN SER CALC-MCNC: 2.2 G/DL (ref 1.9–3.5)
GLUCOSE BLD-MCNC: 129 MG/DL (ref 65–99)
GLUCOSE BLD-MCNC: 148 MG/DL (ref 65–99)
GLUCOSE BLD-MCNC: 189 MG/DL (ref 65–99)
GLUCOSE BLD-MCNC: 227 MG/DL (ref 65–99)
GLUCOSE SERPL-MCNC: 177 MG/DL (ref 65–99)
HCT VFR BLD AUTO: 35.3 % (ref 37–47)
HGB BLD-MCNC: 11.8 G/DL (ref 12–16)
IMM GRANULOCYTES # BLD AUTO: 0.02 K/UL (ref 0–0.11)
IMM GRANULOCYTES NFR BLD AUTO: 0.4 % (ref 0–0.9)
LYMPHOCYTES # BLD AUTO: 1.87 K/UL (ref 1–4.8)
LYMPHOCYTES NFR BLD: 32.7 % (ref 22–41)
MAGNESIUM SERPL-MCNC: 1.7 MG/DL (ref 1.5–2.5)
MCH RBC QN AUTO: 28.9 PG (ref 27–33)
MCHC RBC AUTO-ENTMCNC: 33.4 G/DL (ref 33.6–35)
MCV RBC AUTO: 86.5 FL (ref 81.4–97.8)
MONOCYTES # BLD AUTO: 0.56 K/UL (ref 0–0.85)
MONOCYTES NFR BLD AUTO: 9.8 % (ref 0–13.4)
NEUTROPHILS # BLD AUTO: 3.19 K/UL (ref 2–7.15)
NEUTROPHILS NFR BLD: 55.8 % (ref 44–72)
NRBC # BLD AUTO: 0 K/UL
NRBC BLD-RTO: 0 /100 WBC
PLATELET # BLD AUTO: 177 K/UL (ref 164–446)
PMV BLD AUTO: 10.1 FL (ref 9–12.9)
POTASSIUM SERPL-SCNC: 3.3 MMOL/L (ref 3.6–5.5)
PROT SERPL-MCNC: 5.1 G/DL (ref 6–8.2)
RBC # BLD AUTO: 4.08 M/UL (ref 4.2–5.4)
SODIUM SERPL-SCNC: 138 MMOL/L (ref 135–145)
WBC # BLD AUTO: 5.7 K/UL (ref 4.8–10.8)

## 2018-06-06 PROCEDURE — A9270 NON-COVERED ITEM OR SERVICE: HCPCS | Performed by: HOSPITALIST

## 2018-06-06 PROCEDURE — 99233 SBSQ HOSP IP/OBS HIGH 50: CPT | Performed by: INTERNAL MEDICINE

## 2018-06-06 PROCEDURE — A9270 NON-COVERED ITEM OR SERVICE: HCPCS | Performed by: INTERNAL MEDICINE

## 2018-06-06 PROCEDURE — 80053 COMPREHEN METABOLIC PANEL: CPT

## 2018-06-06 PROCEDURE — 700102 HCHG RX REV CODE 250 W/ 637 OVERRIDE(OP): Performed by: INTERNAL MEDICINE

## 2018-06-06 PROCEDURE — 700105 HCHG RX REV CODE 258: Performed by: HOSPITALIST

## 2018-06-06 PROCEDURE — 85025 COMPLETE CBC W/AUTO DIFF WBC: CPT

## 2018-06-06 PROCEDURE — 700111 HCHG RX REV CODE 636 W/ 250 OVERRIDE (IP): Performed by: HOSPITALIST

## 2018-06-06 PROCEDURE — 700111 HCHG RX REV CODE 636 W/ 250 OVERRIDE (IP): Performed by: INTERNAL MEDICINE

## 2018-06-06 PROCEDURE — 700102 HCHG RX REV CODE 250 W/ 637 OVERRIDE(OP): Performed by: HOSPITALIST

## 2018-06-06 PROCEDURE — 700105 HCHG RX REV CODE 258: Performed by: INTERNAL MEDICINE

## 2018-06-06 PROCEDURE — 83735 ASSAY OF MAGNESIUM: CPT

## 2018-06-06 PROCEDURE — 82962 GLUCOSE BLOOD TEST: CPT | Mod: 91

## 2018-06-06 PROCEDURE — 770022 HCHG ROOM/CARE - ICU (200)

## 2018-06-06 PROCEDURE — 99233 SBSQ HOSP IP/OBS HIGH 50: CPT | Performed by: HOSPITALIST

## 2018-06-06 RX ORDER — ERYTHROMYCIN 250 MG/1
500 TABLET, DELAYED RELEASE ORAL
Status: DISCONTINUED | OUTPATIENT
Start: 2018-06-06 | End: 2018-06-06

## 2018-06-06 RX ORDER — SODIUM CHLORIDE, SODIUM LACTATE, POTASSIUM CHLORIDE, CALCIUM CHLORIDE 600; 310; 30; 20 MG/100ML; MG/100ML; MG/100ML; MG/100ML
INJECTION, SOLUTION INTRAVENOUS CONTINUOUS
Status: DISCONTINUED | OUTPATIENT
Start: 2018-06-06 | End: 2018-06-11 | Stop reason: HOSPADM

## 2018-06-06 RX ORDER — MAGNESIUM SULFATE HEPTAHYDRATE 40 MG/ML
2 INJECTION, SOLUTION INTRAVENOUS ONCE
Status: COMPLETED | OUTPATIENT
Start: 2018-06-06 | End: 2018-06-06

## 2018-06-06 RX ORDER — GABAPENTIN 100 MG/1
100 CAPSULE ORAL 2 TIMES DAILY
Status: DISCONTINUED | OUTPATIENT
Start: 2018-06-06 | End: 2018-06-11 | Stop reason: HOSPADM

## 2018-06-06 RX ORDER — HALOPERIDOL 5 MG/ML
5 INJECTION INTRAMUSCULAR EVERY 6 HOURS
Status: COMPLETED | OUTPATIENT
Start: 2018-06-06 | End: 2018-06-07

## 2018-06-06 RX ORDER — POTASSIUM CHLORIDE 29.8 MG/ML
40 INJECTION INTRAVENOUS ONCE
Status: COMPLETED | OUTPATIENT
Start: 2018-06-06 | End: 2018-06-06

## 2018-06-06 RX ADMIN — ERYTHROMYCIN 500 MG: 250 TABLET, DELAYED RELEASE ORAL at 12:26

## 2018-06-06 RX ADMIN — FAMOTIDINE 20 MG: 10 INJECTION, SOLUTION INTRAVENOUS at 21:04

## 2018-06-06 RX ADMIN — SUCRALFATE 1 G: 1 SUSPENSION ORAL at 05:54

## 2018-06-06 RX ADMIN — METOCLOPRAMIDE 10 MG: 5 INJECTION, SOLUTION INTRAMUSCULAR; INTRAVENOUS at 15:07

## 2018-06-06 RX ADMIN — METOCLOPRAMIDE 10 MG: 5 INJECTION, SOLUTION INTRAMUSCULAR; INTRAVENOUS at 02:55

## 2018-06-06 RX ADMIN — INSULIN HUMAN 3 UNITS: 100 INJECTION, SOLUTION PARENTERAL at 12:26

## 2018-06-06 RX ADMIN — DIPHENHYDRAMINE HYDROCHLORIDE 25 MG: 50 INJECTION, SOLUTION INTRAMUSCULAR; INTRAVENOUS at 00:49

## 2018-06-06 RX ADMIN — HALOPERIDOL LACTATE 5 MG: 5 INJECTION, SOLUTION INTRAMUSCULAR at 17:36

## 2018-06-06 RX ADMIN — DIPHENHYDRAMINE HYDROCHLORIDE 25 MG: 50 INJECTION, SOLUTION INTRAMUSCULAR; INTRAVENOUS at 05:52

## 2018-06-06 RX ADMIN — METOCLOPRAMIDE 10 MG: 5 INJECTION, SOLUTION INTRAMUSCULAR; INTRAVENOUS at 08:31

## 2018-06-06 RX ADMIN — ERYTHROMYCIN 500 MG: 250 TABLET, DELAYED RELEASE ORAL at 05:54

## 2018-06-06 RX ADMIN — SODIUM CHLORIDE, POTASSIUM CHLORIDE, SODIUM LACTATE AND CALCIUM CHLORIDE: 600; 310; 30; 20 INJECTION, SOLUTION INTRAVENOUS at 21:18

## 2018-06-06 RX ADMIN — SUCRALFATE 1 G: 1 SUSPENSION ORAL at 12:26

## 2018-06-06 RX ADMIN — DIPHENHYDRAMINE HYDROCHLORIDE 25 MG: 50 INJECTION, SOLUTION INTRAMUSCULAR; INTRAVENOUS at 12:25

## 2018-06-06 RX ADMIN — ONDANSETRON 4 MG: 2 INJECTION INTRAMUSCULAR; INTRAVENOUS at 05:52

## 2018-06-06 RX ADMIN — SODIUM CHLORIDE, POTASSIUM CHLORIDE, SODIUM LACTATE AND CALCIUM CHLORIDE: 600; 310; 30; 20 INJECTION, SOLUTION INTRAVENOUS at 13:13

## 2018-06-06 RX ADMIN — ENOXAPARIN SODIUM 40 MG: 100 INJECTION SUBCUTANEOUS at 08:31

## 2018-06-06 RX ADMIN — SUCRALFATE 1 G: 1 SUSPENSION ORAL at 17:36

## 2018-06-06 RX ADMIN — MAGNESIUM SULFATE IN WATER 2 G: 40 INJECTION, SOLUTION INTRAVENOUS at 08:31

## 2018-06-06 RX ADMIN — POTASSIUM CHLORIDE 40 MEQ: 400 INJECTION, SOLUTION INTRAVENOUS at 08:31

## 2018-06-06 RX ADMIN — ONDANSETRON 4 MG: 2 INJECTION INTRAMUSCULAR; INTRAVENOUS at 02:55

## 2018-06-06 RX ADMIN — SODIUM CHLORIDE, POTASSIUM CHLORIDE, SODIUM LACTATE AND CALCIUM CHLORIDE: 600; 310; 30; 20 INJECTION, SOLUTION INTRAVENOUS at 14:00

## 2018-06-06 RX ADMIN — DIPHENHYDRAMINE HYDROCHLORIDE 25 MG: 50 INJECTION, SOLUTION INTRAMUSCULAR; INTRAVENOUS at 23:06

## 2018-06-06 RX ADMIN — INSULIN HUMAN 4 UNITS: 100 INJECTION, SOLUTION PARENTERAL at 08:32

## 2018-06-06 RX ADMIN — TRAMADOL HYDROCHLORIDE 50 MG: 50 TABLET, COATED ORAL at 12:26

## 2018-06-06 RX ADMIN — DIPHENHYDRAMINE HYDROCHLORIDE 25 MG: 50 INJECTION, SOLUTION INTRAMUSCULAR; INTRAVENOUS at 17:36

## 2018-06-06 RX ADMIN — HALOPERIDOL LACTATE 5 MG: 5 INJECTION, SOLUTION INTRAMUSCULAR at 23:06

## 2018-06-06 RX ADMIN — FAMOTIDINE 20 MG: 10 INJECTION, SOLUTION INTRAVENOUS at 08:31

## 2018-06-06 RX ADMIN — PROCHLORPERAZINE EDISYLATE 10 MG: 5 INJECTION INTRAMUSCULAR; INTRAVENOUS at 04:44

## 2018-06-06 RX ADMIN — METOCLOPRAMIDE 10 MG: 5 INJECTION, SOLUTION INTRAMUSCULAR; INTRAVENOUS at 21:24

## 2018-06-06 ASSESSMENT — PAIN SCALES - GENERAL
PAINLEVEL_OUTOF10: 3
PAINLEVEL_OUTOF10: 4
PAINLEVEL_OUTOF10: 0
PAINLEVEL_OUTOF10: 6
PAINLEVEL_OUTOF10: 4
PAINLEVEL_OUTOF10: 0

## 2018-06-06 ASSESSMENT — ENCOUNTER SYMPTOMS
WEIGHT LOSS: 0
FEVER: 0
SHORTNESS OF BREATH: 0
BLOOD IN STOOL: 0
SPUTUM PRODUCTION: 0
COUGH: 0
FOCAL WEAKNESS: 0
ORTHOPNEA: 0
SENSORY CHANGE: 0
PALPITATIONS: 0
NAUSEA: 1
STRIDOR: 0
DIZZINESS: 0
VOMITING: 1
ABDOMINAL PAIN: 1
HEARTBURN: 0
MYALGIAS: 0
CHILLS: 0
CONSTIPATION: 0

## 2018-06-06 NOTE — PROGRESS NOTES
Notified pharmacy that patient is unable to keep down PO meds, IV erythromycin is not available due to national shortage. Per pharmacy, Dr. Joiner is aware that patient may not be able to take/keep down PO erythromycin and will address in AM.

## 2018-06-06 NOTE — CARE PLAN
Problem: Safety  Goal: Will remain free from injury  Outcome: PROGRESSING AS EXPECTED      Problem: Bowel/Gastric:  Goal: Normal bowel function is maintained or improved  Outcome: PROGRESSING SLOWER THAN EXPECTED      Problem: Knowledge Deficit  Goal: Knowledge of disease process/condition, treatment plan, diagnostic tests, and medications will improve  Outcome: PROGRESSING AS EXPECTED      Problem: Pain Management  Goal: Pain level will decrease to patient's comfort goal  Outcome: PROGRESSING AS EXPECTED

## 2018-06-06 NOTE — ASSESSMENT & PLAN NOTE
Improved .  HIDA, US abd - no clear cause for N/V  cw reglan, Trial of sched haldol   Prn zofran, compazine  EGD showling pyloric stenosis which was dilated- monitor response.  Resume clears- adv as luis ramando  GI input.

## 2018-06-06 NOTE — PROGRESS NOTES
[facility] Hospitalist Progress Note    Date of Service: 2018    Chief Complaint  27 y.o. female admitted 2018 with nausea/vomiting and high blood sugars.    Ms Sparks has history of type I diabetes and frequent admissions for diabetic ketoacidosis. Diabetes complicated by gastroparesis.    Interval Problem Update  Patient continues to have almost constant emesis, she is vomiting every 30 minutes while awake, emesis is non bloody, no constipation  Not really taking anything by mouth  Complains of abdominal pain, generalized burning pain in mid abdomen without radiation, worse when she vomits  Blood sugars 139-314    Consultants/Specialty  Critical care, I discussed the patient's condition with Dr. Alan today on ICU rounds  Gastroenterology, I discussed with Dr. Paredes who has kindly agreed to see the patient in consultation    Disposition  Keep in ICU for now, high nursing care needs for intractable N/V      Review of Systems   Constitutional: Positive for malaise/fatigue. Negative for chills and weight loss.   Cardiovascular: Negative for chest pain, palpitations and orthopnea.   Gastrointestinal: Positive for abdominal pain, nausea and vomiting. Negative for blood in stool, constipation and heartburn.   Genitourinary: Negative for dysuria and urgency.      Physical Exam  Laboratory/Imaging   Hemodynamics  Temp (24hrs), Av.9 °C (98.5 °F), Min:36.6 °C (97.8 °F), Max:37.1 °C (98.8 °F)   Temperature: 36.9 °C (98.5 °F)  Pulse  Av.6  Min: 85  Max: 160 Heart Rate (Monitored): 100  NIBP: 156/80      Respiratory      Respiration: (!) 23, Pulse Oximetry: 97 %        RUL Breath Sounds: Diminished, RML Breath Sounds: Diminished, RLL Breath Sounds: Diminished, NANCY Breath Sounds: Diminished, LLL Breath Sounds: Diminished    Fluids    Intake/Output Summary (Last 24 hours) at 18 1227  Last data filed at 18 0639   Gross per 24 hour   Intake             2400 ml   Output             1700 ml   Net               700 ml       Nutrition  Orders Placed This Encounter   Procedures   • DIET ORDER     Standing Status:   Standing     Number of Occurrences:   1     Order Specific Question:   Diet:     Answer:   Diabetic [3]     Physical Exam   Constitutional: She is oriented to person, place, and time. She appears well-developed and well-nourished. She appears distressed.   HENT:   Head: Normocephalic and atraumatic.   Right Ear: External ear normal.   Left Ear: External ear normal.   Eyes: EOM are normal. Pupils are equal, round, and reactive to light. No scleral icterus.   Neck: Normal range of motion. Neck supple.   Cardiovascular: Normal rate, regular rhythm and intact distal pulses.    No murmur heard.  Pulmonary/Chest: Effort normal and breath sounds normal. No respiratory distress. She has no wheezes.   Abdominal: Soft. Bowel sounds are normal. She exhibits no distension. There is tenderness. There is no rebound.   Musculoskeletal: Normal range of motion. She exhibits no edema.   Neurological: She is alert and oriented to person, place, and time. No cranial nerve deficit.   Skin: Skin is warm and dry. She is not diaphoretic. No erythema.   Psychiatric: She has a normal mood and affect.   Nursing note and vitals reviewed.      Recent Labs      06/04/18   1027  06/06/18   0300   WBC  7.6  5.7   RBC  4.36  4.08*   HEMOGLOBIN  12.6  11.8*   HEMATOCRIT  37.7  35.3*   MCV  86.5  86.5   MCH  28.9  28.9   MCHC  33.4*  33.4*   RDW  42.5  41.1   PLATELETCT  166  177   MPV  11.0  10.1     Recent Labs      06/04/18   2330  06/05/18   0338  06/06/18   0300   SODIUM  142  141  138   POTASSIUM  3.2*  3.3*  3.3*   CHLORIDE  107  107  103   CO2  18*  22  26   GLUCOSE  164*  197*  177*   BUN  5*  5*  6*   CREATININE  0.62  0.46*  0.41*   CALCIUM  8.6  8.5  8.2*                      Assessment/Plan     * Gastroparesis due to DM (HCC)- (present on admission)   Assessment & Plan    Says that she has previously had confirmed gastroparesis  diagnosis by EGD  Patient was only seen by gastroenterology during that hospital visit in 2014, she has no regular gastroenterologist  She has intractable N/V, slow to resolve  Continue zofran and reglan, no IV erythromycin as available in the hospital due to a national shortage, will trial enteral erythromycin  Monitor on telemetry with above multiple agents that could prolong her QT        Type 1 diabetes mellitus (HCC)- (present on admission)   Assessment & Plan    Uncontrolled, HbA1c 12.5  Insulin and sliding scale  Encourage compliance with insulin        Hypophosphatemia- (present on admission)   Assessment & Plan    Replete        Hypocalcemia- (present on admission)   Assessment & Plan    Repleted        Diabetic ketoacidosis (HCC)- (present on admission)   Assessment & Plan    Acidosis resolved  Lantus and sliding scale            Quality-Core Measures   Reviewed items::  Labs reviewed and Medications reviewed  Pitt catheter::  No Pitt  DVT prophylaxis pharmacological::  Enoxaparin (Lovenox)

## 2018-06-06 NOTE — PROGRESS NOTES
"Pulmonary Critical Care Progress Note      Admission Date: 6/2/2018    Chief Complaint:   DKA    HPI: \"Alis Sparks is a 27 y.o. female with a past medical history of type 1 DM with previous hospitalizations for DKA complicated by gastroparesis who presented to the ER today with complaints of nausea and vomiting since last night.  She notes that she has had elevated blood sugars for the past week.  She notes that the nausea and vomiting is associated with crampy generalized abdominal pain.  She had an episode of watery diarrhea 2 days ago.  She notes that the vomitus is bilious in nature without coffee ground emesis or hematemesis.  No blood in the diarrhea.  She notes that her symptoms are the same from her previous DKA episodes.  She had had lightheadedness without syncope.  No cough, chest pain, recent infections, urinary symptoms, or recent sick contacts.     She was initially admitted to the CDU as her labs were not profoundly consistent with DKA; however, upon further checking and continued nausea/vomiting, the patient went into severe DKA requiring transfer into the ICU with insulin drip infusing.\"    Interval Events:  24 hour interval history reviewed  Reason for visit: DKA   - emesis all night   - Neuro: AOx4   - HR: 90s-100s sinus   - BP: 130s-150s   - GI: Emesis all night   - UOP: adequate   - Pitt: no   - Tm: afeb   - Lines: CVC   - PPx: GI pepcid, DVT lovenox    Yesterday   - continues to vomit overnight   - Neuro: AOx4   - HR: ST 110s-140s   - BP: 130s-140s   - GI: ADA diet, emesis   - UOP: adequate   - Pitt: no   - Tm: afeb   - Lines: CVC   - PPx: GI NPO, pepcid, DVT lovenox   - CO2 improved    Review of Systems   Constitutional: Negative for chills and fever.   Respiratory: Negative for cough, sputum production, shortness of breath and stridor.    Cardiovascular: Negative for chest pain.   Gastrointestinal: Positive for abdominal pain, nausea and vomiting.   Genitourinary: Negative for " dysuria.   Musculoskeletal: Negative for myalgias.   Skin: Negative for rash.   Neurological: Negative for dizziness, sensory change and focal weakness.       PFSH:  No change.      Physical Exam   Constitutional: She is oriented to person, place, and time. She appears unhealthy. She has a sickly appearance.   HENT:   Head: Normocephalic and atraumatic.   Right Ear: External ear normal.   Left Ear: External ear normal.   Nose: Nose normal.   Dry mucous membranes   Eyes: Conjunctivae and EOM are normal. Pupils are equal, round, and reactive to light. Right eye exhibits no discharge. Left eye exhibits no discharge. No scleral icterus.   Neck: Normal range of motion. Neck supple. No JVD present. No tracheal deviation present.   Cardiovascular: Normal rate, regular rhythm and intact distal pulses.    No murmur heard.  Pulmonary/Chest: No stridor. She has no wheezes. She has no rales.   Abdominal: Soft. Bowel sounds are normal. She exhibits no distension. There is tenderness (mild, difuse - mostly epigastric). There is no rebound.   Musculoskeletal: She exhibits no edema.   No clubbing or cyanosis   Neurological: She is alert and oriented to person, place, and time. GCS score is 15.   No focal weakness   Skin: Skin is warm and dry. No rash noted. She is not diaphoretic.   Nursing note and vitals reviewed.      Respiratory:     Pulse Oximetry: 97 %  CXR images personally reviewed and compared to prior images    HemoDynamics:  Pulse: 85, Heart Rate (Monitored): 86  NIBP: 146/79       Neuro:    Fluids:  Intake/Output       06/04/18 0700 - 06/05/18 0659 06/05/18 0700 - 06/06/18 0659 06/06/18 0700 - 06/07/18 0659      [id number] 8721-2660 Total 1926-5097 3011-8618 Total 6992-5118 8341-8673 Total       Intake    P.O.  --  0 0  --  -- --  --  -- --    P.O. -- 0 0 -- -- -- -- -- --    I.V.  2400  1800 4200  1800  1500 3300  --  -- --    IV Volume (IV bolus) 1000 -- 1000 -- -- -- -- -- --    IV Volume (KCL) 200 -- 200 -- -- -- --  -- --    IV Volume (D10) 1200 -- 1200 -- -- -- -- -- --    IV Volume (LR) -- 1800 1800 1800 1500 3300 -- -- --    Total Intake 2400 1800 4200 1800 1500 3300 -- -- --       Output    Urine  800  500 1300  600  1000 1600  --  -- --    Number of Times Voided 1 x 1 x 2 x 1 x 1 x 2 x -- -- --    Urine Void (mL) (non-catheter)  600 1000 1600 -- -- --    Emesis  1025  700 1725  400  600 1000  --  -- --    Emesis 5360 261 7055  -- -- --    Stool  --  -- --  --  -- --  --  -- --    Number of Times Stooled 0 x 0 x 0 x -- -- -- -- -- --    Total Output 1825 1200 3025 1000 1600 2600 -- -- --       Net I/O      800 -100 700 -- -- --        Weight: 90 kg (198 lb 6.6 oz)  Recent Labs      18   0300   SODIUM  139   < >  142  141  138   POTASSIUM  3.7   < >  3.2*  3.3*  3.3*   CHLORIDE  116*   < >  107  107  103   CO2  16*   < >  18*  22  26   BUN  8   < >  5*  5*  6*   CREATININE  0.50   < >  0.62  0.46*  0.41*   MAGNESIUM  1.5   --    --    --   1.7   PHOSPHORUS   --    --    --   2.5   --    CALCIUM  8.4*   < >  8.6  8.5  8.2*    < > = values in this interval not displayed.       GI/Nutrition:    Liver Function  Recent Labs      18   03318   0300   ALTSGPT   --    --   18   ASTSGOT   --    --   24   ALKPHOSPHAT   --    --   83   TBILIRUBIN   --    --   0.5   GLUCOSE  164*  197*  177*       Heme:  Recent Labs      18   1027  18   0300   RBC  4.36  4.08*   HEMOGLOBIN  12.6  11.8*   HEMATOCRIT  37.7  35.3*   PLATELETCT  166  177       Infectious Disease:  Temp  Av.8 °C (98.2 °F)  Min: 36.4 °C (97.6 °F)  Max: 37.1 °C (98.8 °F)    Recent Labs      18   1027  18   0300   WBC  7.6  5.7   NEUTSPOLYS  63.10  55.80   LYMPHOCYTES  25.40  32.70   MONOCYTES  9.30  9.80   EOSINOPHILS  1.20  0.90   BASOPHILS  0.70  0.40   ASTSGOT   --   24   ALTSGPT   --   18   ALKPHOSPHAT   --   83    TBILIRUBIN   --   0.5     Current Facility-Administered Medications   Medication Dose Frequency Provider Last Rate Last Dose   • erythromycin base (UMU-TAB) tablet 500 mg  500 mg TID AC Hugo Joiner M.D.   500 mg at 06/06/18 0554   • potassium chloride in water (KCL) ivpb **Administer in ICU only** 40 mEq  40 mEq Once Cholo Alan Jr., D.O.       • magnesium sulfate IVPB premix 2 g  2 g Once Cholo Alan Jr., D.O.       • MD ALERT...Adult ICU Electrolyte Replacement per Pharmacy Protocol   PRN Cholo Alan Jr., D.O.       • insulin glargine (LANTUS) injection 40 Units  40 Units Q EVENING Cholo Alan Jr. D.O.   40 Units at 06/05/18 1745   • ketorolac (TORADOL) injection 15 mg  15 mg Q6HRS PRN Cholo Alan Jr., D.O.   15 mg at 06/05/18 2237   • sucralfate (CARAFATE) 1 GM/10ML suspension 1 g  1 g Q6HRS Cholo Alan Jr., D.O.   1 g at 06/06/18 0554   • tramadol (ULTRAM) 50 MG tablet  mg   mg Q6HRS PRN George Benítez M.D.   50 mg at 06/05/18 1601   • diphenhydrAMINE (BENADRYL) injection 25 mg  25 mg Q6HRS Cholo Alan Jr., D.O.   25 mg at 06/06/18 0552   • prochlorperazine (COMPAZINE) tablet 10 mg  10 mg Q6HRS PRN Cholo Alan Jr. D.O.       • ondansetron (ZOFRAN) syringe/vial injection 4 mg  4 mg Q4HRS Cholo Alan Jr. D.O.   4 mg at 06/06/18 0552   • prochlorperazine (COMPAZINE) injection 10 mg  10 mg Q6HRS PRN George Benítez M.D.   10 mg at 06/06/18 0444   • insulin regular (HUMULIN R) injection 3-14 Units  3-14 Units 4X/DAY ACHS Cholo Alan Jr., D.O.   Stopped at 06/05/18 2100    And   • glucose 4 g chewable tablet 16 g  16 g Q15 MIN PRN PAULO Rey Jr..O.        And   • dextrose 50% (D50W) injection 25 mL  25 mL Q15 MIN PRN Cholo Alan Jr. D.O.       • prochlorperazine (COMPAZINE) suppository 25 mg  25 mg Q12HRS PRN PAULO Rey Jr..O.       • lactated ringers infusion   Continuous PAULO Rey Jr..O. 125 mL/hr at 06/05/18 2111     •  famotidine (PEPCID) injection 20 mg  20 mg BID Cholo Alan Jr., D.OHollis   20 mg at 06/05/18 2000   • metoclopramide (REGLAN) injection 10 mg  10 mg Q6HRS Michael Gayle M.D.   10 mg at 06/06/18 0255   • atorvastatin (LIPITOR) tablet 20 mg  20 mg QHS Pa Urbina M.D.   Stopped at 06/02/18 2100   • vitamin D (cholecalciferol) tablet 2,000 Units  2,000 Units DAILY Pa Urbina M.D.   Stopped at 06/02/18 1430   • ferrous sulfate tablet 325 mg  325 mg DAILY Pa Urbina M.D.   Stopped at 06/02/18 1702   • senna-docusate (PERICOLACE or SENOKOT S) 8.6-50 MG per tablet 2 Tab  2 Tab BID Pa Urbina M.D.   Stopped at 06/02/18 2100    And   • polyethylene glycol/lytes (MIRALAX) PACKET 1 Packet  1 Packet QDAY PRN Pa Urbina M.D.        And   • magnesium hydroxide (MILK OF MAGNESIA) suspension 30 mL  30 mL QDAY PRN Pa Urbina M.D.        And   • bisacodyl (DULCOLAX) suppository 10 mg  10 mg QDAY PRN Pa Urbina M.D.       • enoxaparin (LOVENOX) inj 40 mg  40 mg DAILY Pa Urbina M.D.   40 mg at 06/05/18 0909   • ondansetron (ZOFRAN ODT) dispertab 4 mg  4 mg Q4HRS PRN Pa Urbina M.D.       • acetaminophen (TYLENOL) tablet 650 mg  650 mg Q6HRS PRN Pa Urbina M.D.       • labetalol (NORMODYNE,TRANDATE) injection 10 mg  10 mg Q4HRS PRN Pa Urbina M.D.         Last reviewed on 6/2/2018 11:56 AM by Blade Darden    Quality  Measures:   Labs reviewed, Medications reviewed and Radiology images reviewed   Pitt catheter: No Pitt  Central line in place: Need for access     DVT Prophylaxis: Enoxaparin (Lovenox)   DVT prophylaxis - mechanical: SCDs   Ulcer prophylaxis: Yes        Assessment and Plan:  DKA   - improved   - basal insulin, sliding scale insulin, accuchecks   - hypoglycemia protocol    Diabetic gastroparesis   - refractory   - attempt benadryl and erythromycin   - continue Compazine, reglan, benadryl   -  when necessary Zofran   - No IV erythromycin in stock (backordered)   - Symptomatic control of nausea and vomiting   - PO as tolerated, LR for IVF   - GI consult for assistance with management    DM type I   - Attempt to transition to basal and sliding scale insulin    Esophagitis   - As seen on CT   - pepcid, carafate, GI cocktail x1    Hypokalemia   - replete today    Discussed patient condition and risk of morbidity and/or mortality with RN, RT, Pharmacy, , , Charge nurse / hot rounds, Patient and hospitalist.

## 2018-06-06 NOTE — CONSULTS
Gastroenterology Consult    CC: We were asked to evaluate patient by Dr. Benítez given nausea and vomiting.    HPI:  26 y/o with type 1 IDDM and possible gastroparesis that presented for recurrent nausea and vomiting and DKA.    She has had long history of multiple admissions for recurrent nausea and vomiting and DKA dating back to 2014.  She was evaluated in 2014 with EGD and 90 minute gastric emptying study.  EGD was unremarkable except nonerosive gastritis and single duodenal diverticulum.  Gastric biopsies negative for H pylori.  She also had 90 minute gastric emptying study that was normal.  She was diagnosed with suspected gastroparesis and started on Reglan.  She has not been seen in GI clinic since 2015.  She did have recent CT ab/pelvis showing some thickening of distal esophagus and abdominal US which shows small GB sludge only.    Since then, she has had numerous admissions for similar presentation.  She states that she will feel well for several months and then develop some epigastric pain that lasts for a day and then onset of intractable nausea and vomiting leading to DKA and admission to hospital.  SHe is unable to identify any obvious triggers and feels that episodes happen every few months.  She is treated with insulin drip, IVF and antiemetics.  Even after DKA improves, she will have prolonged issues with intractable nausea and vomiting for several days before starting to feel better.    She denies NSAID use.  Denies any marijuana use.  No new medications.  Diabetes has been challenging to control.    During this admission, her DKA has improved but still vomiting.  She has been on IV Reglan, Zofran, Compazine and Benadryl without much improvement.    PMH:  IDDM, poorly controlled  Hyperlipidemia  Possible gastroparesis    PSH:  EGD  Submandibular I&D    Meds:  Tylenol  Lipitor  Benadryl  Lovenox  Pepcid  Ferrous sulfate  Insulin  Lactated ringers  Reglan  Zofran  Compazine  Sucralfate  Vitamin  D    Allergies:  PCN  Lisinopril  Promethazine    FH:  Denies GI malignancies    SH:  Denies tobacco, EtOH, IVDU.  No marijuana use    ROS:  A complete 14 point ROS was obtained and found to be significant for nausea, vomiting, epigastric pain but was otherwise unremarkable.    PE:  Vitals: Afebrile, 90s, 110/70s, RA  General: Alert and oriented X3.  Dry heaving  Eyes: Anicteric sclerae  Mouth: Normal oropharynx  Neck: No LAD  Thyroid: No thyromegaly  Lungs: CTA B  CV: RRR  Ab: soft, tender epigastric area without rebound or guarding, +BS, ND, no HS, no masses  Ex: No edema  Neuro: Nonfocal  Skin: no rashes    Labs:  WBC 6, Hgb 11.8, platelets 177  Na 138, K 3.3, BUN 6, Cr 0.4, LFT normal, albumin 2.9, Mg 1.7    CT ab/pelvis (6/3/2018): hepatomegaly, thickened esophagus  RUQ US (4/2018): GB sludge    Impression/Plan:  26 y/o with type 1 diabetes, poorly controlled, possible gastroparesis that presented for recurrent nausea, vomiting, epigastric pain and DKA.  She has had multiple admissions since around 2014.  Has been diagnosed with gastroparesis although prior 90 minute GES was normal.  Suspect symptoms may be more related to cyclic vomiting syndrome rather than recurrent gastroparesis flare based on description.  Do not feel she needs repeat EGD currently.  Recommend ongoing supportive care with IVF and antiemetics.  Will also trial Haldol as this can be beneficial in acute vomiting with cyclic vomiting syndrome.  Will also start Neurontin for possible prevention of cyclic vomiting episodes.  She also needs repeat outpatient 4 hour gastric emptying study when she feels well to assess for gastroparesis.    PROBLEMS  1. Intractable nausea and vomiting  2. Epigastric pain  3. Recurrent DKA  4. IDDM, poorly controlled  5. GB sludge  6. Abnormal CT scan with thickened esophagus, likely erosive esophagitis from vomiting  7. Hyperlipidemia    PLAN:  1. Trial Haldol 5 mg IV q6 hours x4 doses  2. Continue IV Reglan 10 mg  q6  3. Antiemetics as needed including Zofran, Compazine, Benadryl  4. Start Neurontin 100 mg BID  5. Will need outpatient 4 hr gastric emptying study  6. Hold on repeat EGD currently

## 2018-06-07 ENCOUNTER — APPOINTMENT (OUTPATIENT)
Dept: CARDIOLOGY | Facility: MEDICAL CENTER | Age: 28
End: 2018-06-07
Attending: INTERNAL MEDICINE
Payer: MEDICAID

## 2018-06-07 LAB
ALBUMIN SERPL BCP-MCNC: 2.7 G/DL (ref 3.2–4.9)
ALBUMIN/GLOB SERPL: 1.5 G/DL
ALP SERPL-CCNC: 91 U/L (ref 30–99)
ALT SERPL-CCNC: 22 U/L (ref 2–50)
ANION GAP SERPL CALC-SCNC: 9 MMOL/L (ref 0–11.9)
AST SERPL-CCNC: 30 U/L (ref 12–45)
BASOPHILS # BLD AUTO: 0.4 % (ref 0–1.8)
BASOPHILS # BLD: 0.02 K/UL (ref 0–0.12)
BILIRUB SERPL-MCNC: 0.6 MG/DL (ref 0.1–1.5)
BUN SERPL-MCNC: 7 MG/DL (ref 8–22)
CALCIUM SERPL-MCNC: 7.8 MG/DL (ref 8.5–10.5)
CHLORIDE SERPL-SCNC: 101 MMOL/L (ref 96–112)
CO2 SERPL-SCNC: 26 MMOL/L (ref 20–33)
CREAT SERPL-MCNC: 0.36 MG/DL (ref 0.5–1.4)
EOSINOPHIL # BLD AUTO: 0.17 K/UL (ref 0–0.51)
EOSINOPHIL NFR BLD: 3.4 % (ref 0–6.9)
ERYTHROCYTE [DISTWIDTH] IN BLOOD BY AUTOMATED COUNT: 39.6 FL (ref 35.9–50)
GLOBULIN SER CALC-MCNC: 1.8 G/DL (ref 1.9–3.5)
GLUCOSE BLD-MCNC: 137 MG/DL (ref 65–99)
GLUCOSE BLD-MCNC: 189 MG/DL (ref 65–99)
GLUCOSE BLD-MCNC: 210 MG/DL (ref 65–99)
GLUCOSE BLD-MCNC: 226 MG/DL (ref 65–99)
GLUCOSE BLD-MCNC: 242 MG/DL (ref 65–99)
GLUCOSE SERPL-MCNC: 278 MG/DL (ref 65–99)
HCT VFR BLD AUTO: 34.6 % (ref 37–47)
HGB BLD-MCNC: 11.7 G/DL (ref 12–16)
IMM GRANULOCYTES # BLD AUTO: 0.02 K/UL (ref 0–0.11)
IMM GRANULOCYTES NFR BLD AUTO: 0.4 % (ref 0–0.9)
LYMPHOCYTES # BLD AUTO: 1.77 K/UL (ref 1–4.8)
LYMPHOCYTES NFR BLD: 35.2 % (ref 22–41)
MAGNESIUM SERPL-MCNC: 1.9 MG/DL (ref 1.5–2.5)
MCH RBC QN AUTO: 29.2 PG (ref 27–33)
MCHC RBC AUTO-ENTMCNC: 33.8 G/DL (ref 33.6–35)
MCV RBC AUTO: 86.3 FL (ref 81.4–97.8)
MONOCYTES # BLD AUTO: 0.46 K/UL (ref 0–0.85)
MONOCYTES NFR BLD AUTO: 9.1 % (ref 0–13.4)
NEUTROPHILS # BLD AUTO: 2.59 K/UL (ref 2–7.15)
NEUTROPHILS NFR BLD: 51.5 % (ref 44–72)
NRBC # BLD AUTO: 0 K/UL
NRBC BLD-RTO: 0 /100 WBC
PLATELET # BLD AUTO: 167 K/UL (ref 164–446)
PMV BLD AUTO: 9.8 FL (ref 9–12.9)
POTASSIUM SERPL-SCNC: 3.6 MMOL/L (ref 3.6–5.5)
PROT SERPL-MCNC: 4.5 G/DL (ref 6–8.2)
RBC # BLD AUTO: 4.01 M/UL (ref 4.2–5.4)
SODIUM SERPL-SCNC: 136 MMOL/L (ref 135–145)
WBC # BLD AUTO: 5 K/UL (ref 4.8–10.8)

## 2018-06-07 PROCEDURE — 99233 SBSQ HOSP IP/OBS HIGH 50: CPT | Performed by: INTERNAL MEDICINE

## 2018-06-07 PROCEDURE — 700102 HCHG RX REV CODE 250 W/ 637 OVERRIDE(OP): Performed by: INTERNAL MEDICINE

## 2018-06-07 PROCEDURE — 80053 COMPREHEN METABOLIC PANEL: CPT

## 2018-06-07 PROCEDURE — 700105 HCHG RX REV CODE 258: Performed by: HOSPITALIST

## 2018-06-07 PROCEDURE — 700111 HCHG RX REV CODE 636 W/ 250 OVERRIDE (IP): Performed by: INTERNAL MEDICINE

## 2018-06-07 PROCEDURE — 700111 HCHG RX REV CODE 636 W/ 250 OVERRIDE (IP): Performed by: HOSPITALIST

## 2018-06-07 PROCEDURE — A9270 NON-COVERED ITEM OR SERVICE: HCPCS | Performed by: INTERNAL MEDICINE

## 2018-06-07 PROCEDURE — 85025 COMPLETE CBC W/AUTO DIFF WBC: CPT

## 2018-06-07 PROCEDURE — A9270 NON-COVERED ITEM OR SERVICE: HCPCS | Performed by: HOSPITALIST

## 2018-06-07 PROCEDURE — 700102 HCHG RX REV CODE 250 W/ 637 OVERRIDE(OP): Performed by: HOSPITALIST

## 2018-06-07 PROCEDURE — 82962 GLUCOSE BLOOD TEST: CPT | Mod: 91

## 2018-06-07 PROCEDURE — 99233 SBSQ HOSP IP/OBS HIGH 50: CPT | Performed by: HOSPITALIST

## 2018-06-07 PROCEDURE — 770001 HCHG ROOM/CARE - MED/SURG/GYN PRIV*

## 2018-06-07 PROCEDURE — 83735 ASSAY OF MAGNESIUM: CPT

## 2018-06-07 RX ORDER — SCOLOPAMINE TRANSDERMAL SYSTEM 1 MG/1
1 PATCH, EXTENDED RELEASE TRANSDERMAL
Status: DISCONTINUED | OUTPATIENT
Start: 2018-06-07 | End: 2018-06-11 | Stop reason: HOSPADM

## 2018-06-07 RX ORDER — MAGNESIUM SULFATE HEPTAHYDRATE 40 MG/ML
2 INJECTION, SOLUTION INTRAVENOUS ONCE
Status: COMPLETED | OUTPATIENT
Start: 2018-06-07 | End: 2018-06-07

## 2018-06-07 RX ORDER — LORAZEPAM 2 MG/ML
0.5 INJECTION INTRAMUSCULAR EVERY 6 HOURS PRN
Status: DISCONTINUED | OUTPATIENT
Start: 2018-06-07 | End: 2018-06-11 | Stop reason: HOSPADM

## 2018-06-07 RX ORDER — HYDROMORPHONE HYDROCHLORIDE 2 MG/ML
.25-.5 INJECTION, SOLUTION INTRAMUSCULAR; INTRAVENOUS; SUBCUTANEOUS
Status: DISCONTINUED | OUTPATIENT
Start: 2018-06-07 | End: 2018-06-10

## 2018-06-07 RX ORDER — HALOPERIDOL 5 MG/ML
5 INJECTION INTRAMUSCULAR 4 TIMES DAILY
Status: DISCONTINUED | OUTPATIENT
Start: 2018-06-07 | End: 2018-06-08

## 2018-06-07 RX ORDER — POTASSIUM CHLORIDE 14.9 MG/ML
20 INJECTION INTRAVENOUS ONCE
Status: COMPLETED | OUTPATIENT
Start: 2018-06-07 | End: 2018-06-07

## 2018-06-07 RX ADMIN — HALOPERIDOL LACTATE 5 MG: 5 INJECTION, SOLUTION INTRAMUSCULAR at 05:06

## 2018-06-07 RX ADMIN — KETOROLAC TROMETHAMINE 15 MG: 30 INJECTION, SOLUTION INTRAMUSCULAR at 10:50

## 2018-06-07 RX ADMIN — METOCLOPRAMIDE 10 MG: 5 INJECTION, SOLUTION INTRAMUSCULAR; INTRAVENOUS at 03:24

## 2018-06-07 RX ADMIN — MAGNESIUM SULFATE HEPTAHYDRATE 2 G: 40 INJECTION, SOLUTION INTRAVENOUS at 10:58

## 2018-06-07 RX ADMIN — DIPHENHYDRAMINE HYDROCHLORIDE 25 MG: 50 INJECTION, SOLUTION INTRAMUSCULAR; INTRAVENOUS at 05:06

## 2018-06-07 RX ADMIN — SODIUM CHLORIDE, POTASSIUM CHLORIDE, SODIUM LACTATE AND CALCIUM CHLORIDE: 600; 310; 30; 20 INJECTION, SOLUTION INTRAVENOUS at 05:06

## 2018-06-07 RX ADMIN — SCOPALAMINE 1 PATCH: 1 PATCH, EXTENDED RELEASE TRANSDERMAL at 10:13

## 2018-06-07 RX ADMIN — DIPHENHYDRAMINE HYDROCHLORIDE 25 MG: 50 INJECTION, SOLUTION INTRAMUSCULAR; INTRAVENOUS at 17:07

## 2018-06-07 RX ADMIN — SODIUM CHLORIDE, POTASSIUM CHLORIDE, SODIUM LACTATE AND CALCIUM CHLORIDE: 600; 310; 30; 20 INJECTION, SOLUTION INTRAVENOUS at 19:46

## 2018-06-07 RX ADMIN — HYDROMORPHONE HYDROCHLORIDE 0.5 MG: 2 INJECTION INTRAMUSCULAR; INTRAVENOUS; SUBCUTANEOUS at 17:07

## 2018-06-07 RX ADMIN — ENOXAPARIN SODIUM 40 MG: 100 INJECTION SUBCUTANEOUS at 07:32

## 2018-06-07 RX ADMIN — HALOPERIDOL LACTATE 5 MG: 5 INJECTION, SOLUTION INTRAMUSCULAR at 10:57

## 2018-06-07 RX ADMIN — METOCLOPRAMIDE 10 MG: 5 INJECTION, SOLUTION INTRAMUSCULAR; INTRAVENOUS at 21:29

## 2018-06-07 RX ADMIN — HALOPERIDOL LACTATE 5 MG: 5 INJECTION, SOLUTION INTRAMUSCULAR at 21:30

## 2018-06-07 RX ADMIN — POTASSIUM CHLORIDE 20 MEQ: 200 INJECTION, SOLUTION INTRAVENOUS at 10:13

## 2018-06-07 RX ADMIN — INSULIN HUMAN 4 UNITS: 100 INJECTION, SOLUTION PARENTERAL at 10:54

## 2018-06-07 RX ADMIN — GABAPENTIN 100 MG: 100 CAPSULE ORAL at 21:30

## 2018-06-07 RX ADMIN — INSULIN HUMAN 3 UNITS: 100 INJECTION, SOLUTION PARENTERAL at 17:20

## 2018-06-07 RX ADMIN — ATORVASTATIN CALCIUM 20 MG: 20 TABLET, FILM COATED ORAL at 21:30

## 2018-06-07 RX ADMIN — HALOPERIDOL LACTATE 5 MG: 5 INJECTION, SOLUTION INTRAMUSCULAR at 17:07

## 2018-06-07 RX ADMIN — SUCRALFATE 1 G: 1 SUSPENSION ORAL at 05:06

## 2018-06-07 RX ADMIN — METOCLOPRAMIDE 10 MG: 5 INJECTION, SOLUTION INTRAMUSCULAR; INTRAVENOUS at 07:32

## 2018-06-07 RX ADMIN — HYDROMORPHONE HYDROCHLORIDE 0.5 MG: 2 INJECTION INTRAMUSCULAR; INTRAVENOUS; SUBCUTANEOUS at 15:04

## 2018-06-07 RX ADMIN — FAMOTIDINE 20 MG: 10 INJECTION, SOLUTION INTRAVENOUS at 21:30

## 2018-06-07 RX ADMIN — DIPHENHYDRAMINE HYDROCHLORIDE 25 MG: 50 INJECTION, SOLUTION INTRAMUSCULAR; INTRAVENOUS at 10:57

## 2018-06-07 RX ADMIN — METOCLOPRAMIDE 10 MG: 5 INJECTION, SOLUTION INTRAMUSCULAR; INTRAVENOUS at 15:04

## 2018-06-07 RX ADMIN — INSULIN GLARGINE 40 UNITS: 100 INJECTION, SOLUTION SUBCUTANEOUS at 21:34

## 2018-06-07 RX ADMIN — INSULIN HUMAN 4 UNITS: 100 INJECTION, SOLUTION PARENTERAL at 06:36

## 2018-06-07 RX ADMIN — PROCHLORPERAZINE EDISYLATE 10 MG: 5 INJECTION INTRAMUSCULAR; INTRAVENOUS at 15:04

## 2018-06-07 RX ADMIN — FAMOTIDINE 20 MG: 10 INJECTION, SOLUTION INTRAVENOUS at 07:32

## 2018-06-07 ASSESSMENT — ENCOUNTER SYMPTOMS
FEVER: 0
MYALGIAS: 0
DIARRHEA: 0
ABDOMINAL PAIN: 1
HEADACHES: 0
BLOOD IN STOOL: 0
STRIDOR: 0
NAUSEA: 1
SENSORY CHANGE: 0
CONSTIPATION: 0
COUGH: 0
VOMITING: 1
SPUTUM PRODUCTION: 0
SHORTNESS OF BREATH: 0
FOCAL WEAKNESS: 0
LOSS OF CONSCIOUSNESS: 0
BACK PAIN: 0
CHILLS: 0
DIZZINESS: 0

## 2018-06-07 ASSESSMENT — PAIN SCALES - GENERAL
PAINLEVEL_OUTOF10: 5
PAINLEVEL_OUTOF10: 0
PAINLEVEL_OUTOF10: 7
PAINLEVEL_OUTOF10: 7
PAINLEVEL_OUTOF10: 0
PAINLEVEL_OUTOF10: 5
PAINLEVEL_OUTOF10: 0
PAINLEVEL_OUTOF10: 3

## 2018-06-07 NOTE — CARE PLAN
Problem: Pain Management  Goal: Pain level will decrease to patient's comfort goal  Outcome: PROGRESSING AS EXPECTED  Assess patient pain level Q2H and provide interventions as indicated    Problem: Respiratory:  Goal: Respiratory status will improve  Outcome: PROGRESSING SLOWER THAN EXPECTED  Collaborate with respiratory therapy in attempt to wean patient's oxygen requirements

## 2018-06-07 NOTE — PROGRESS NOTES
Hospital Medicine Daily Progress Note    Date of Service  6/7/2018    Chief Complaint  27 y.o. female admitted 6/2/2018 with PMHx of brittle TIDM, diabetic gastroparesis, frequent admissions for DKA      Interval Problem Update  conts to have N  Po intake is inconsistent  LR 125ml/hr  1.5 LNC    Pt c/o some epigastric abd pain which she reports is common for her when she has flares of her gastroparesis.  cont's to have N/V.  Is able to take some po however not much in her estimation      Consultants/Specialty  GI  Pulmonology    Disposition  OK to floor    Review of Systems  Review of Systems   Constitutional: Negative for chills and fever.   Respiratory: Negative for cough and shortness of breath.    Cardiovascular: Negative for chest pain.   Gastrointestinal: Positive for abdominal pain, nausea and vomiting. Negative for blood in stool, constipation and diarrhea.   Musculoskeletal: Negative for back pain.   Skin: Negative for rash.   Neurological: Negative for dizziness, loss of consciousness and headaches.        Physical Exam  Blood Pressure: 118/75   Temperature: 36.8 °C (98.2 °F)   Pulse: 95   Respiration: 18   Pulse Oximetry: 98 %     Physical Exam   Constitutional: She is oriented to person, place, and time. She appears well-developed and well-nourished. No distress.   HENT:   Head: Normocephalic and atraumatic.   Neck: No JVD present.   Cardiovascular: Normal rate and regular rhythm.    Pulmonary/Chest: Effort normal. No stridor. No respiratory distress. She has no wheezes. She has no rales.   Abdominal: Soft. There is tenderness. There is no rebound and no guarding.   Musculoskeletal: She exhibits no edema.   Neurological: She is oriented to person, place, and time.   Skin: Skin is warm and dry. No rash noted. She is not diaphoretic.   Psychiatric: She has a normal mood and affect. Thought content normal.   Nursing note and vitals reviewed.      Fluids    Intake/Output Summary (Last 24 hours) at 06/07/18  0605  Last data filed at 06/07/18 0400   Gross per 24 hour   Intake             2750 ml   Output             2025 ml   Net              725 ml       Laboratory  Recent Labs      06/04/18   1027  06/06/18   0300  06/07/18   0324   WBC  7.6  5.7  5.0   RBC  4.36  4.08*  4.01*   HEMOGLOBIN  12.6  11.8*  11.7*   HEMATOCRIT  37.7  35.3*  34.6*   MCV  86.5  86.5  86.3   MCH  28.9  28.9  29.2   MCHC  33.4*  33.4*  33.8   RDW  42.5  41.1  39.6   PLATELETCT  166  177  167   MPV  11.0  10.1  9.8     Recent Labs      06/05/18   0338  06/06/18   0300  06/07/18   0324   SODIUM  141  138  136   POTASSIUM  3.3*  3.3*  3.6   CHLORIDE  107  103  101   CO2  22  26  26   GLUCOSE  197*  177*  278*   BUN  5*  6*  7*   CREATININE  0.46*  0.41*  0.36*   CALCIUM  8.5  8.2*  7.8*                   Imaging  ECHOCARDIOGRAM COMP W/O CONT   Final Result      DX-CHEST-LIMITED (1 VIEW)   Final Result      1.  No evidence of pneumothorax following RIGHT neck catheter placement      CT-ABDOMEN-PELVIS WITH   Final Result         1.  Thickening of the distal esophageal wall, new since prior study, consider component of esophagitis. Could be followed up with endoscopy for further evaluation as clinically appropriate.   2.  Hepatomegaly      DX-CHEST-PORTABLE (1 VIEW)   Final Result      No acute cardiac or pulmonary abnormalities are identified.           Assessment/Plan  * Gastroparesis due to DM (HCC)- (present on admission)   Assessment & Plan    GI consulted  Scheduled reglan  Trial gabapentin  Prn zofran, compazine  No planned interventions at present  Monitor on telemetry with above multiple agents that could prolong her QT        Type 1 diabetes mellitus (HCC)- (present on admission)   Assessment & Plan    Uncontrolled, HbA1c 12.5  Insulin and sliding scale  Encourage compliance with insulin        Hypophosphatemia- (present on admission)   Assessment & Plan    Replete        Hypocalcemia- (present on admission)   Assessment & Plan     Repleted        Diabetic ketoacidosis (HCC)- (present on admission)   Assessment & Plan    Acidosis resolved  Lantus and sliding scale  Follow CO2 and AG closely

## 2018-06-07 NOTE — PROGRESS NOTES
"Pulmonary Critical Care Progress Note      Admission Date: 6/2/2018    Chief Complaint:   DKA    HPI: \"Alis Sparks is a 27 y.o. female with a past medical history of type 1 DM with previous hospitalizations for DKA complicated by gastroparesis who presented to the ER today with complaints of nausea and vomiting since last night.  She notes that she has had elevated blood sugars for the past week.  She notes that the nausea and vomiting is associated with crampy generalized abdominal pain.  She had an episode of watery diarrhea 2 days ago.  She notes that the vomitus is bilious in nature without coffee ground emesis or hematemesis.  No blood in the diarrhea.  She notes that her symptoms are the same from her previous DKA episodes.  She had had lightheadedness without syncope.  No cough, chest pain, recent infections, urinary symptoms, or recent sick contacts.     She was initially admitted to the CDU as her labs were not profoundly consistent with DKA; however, upon further checking and continued nausea/vomiting, the patient went into severe DKA requiring transfer into the ICU with insulin drip infusing.\"    Interval Events:  24 hour interval history reviewed  Reason for visit: DKA   - Nausea and emesis   - Neuro: AOx4   - HR: 90s-120s SR-ST   - BP: 100s-160s systolic   - GI: emesis, refusing PO   - UOP: adequate   - Pitt: no   - Tm: afeb   - Lines: CVC   - PPx: GI pepcid, DVT lovenox   - 2L NC    Yesterday   - emesis all night   - Neuro: AOx4   - HR: 90s-100s sinus   - BP: 130s-150s   - GI: Emesis all night   - UOP: adequate   - Pitt: no   - Tm: afeb   - Lines: CVC   - PPx: GI pepcid, DVT lovenox    Review of Systems   Constitutional: Negative for chills and fever.   Respiratory: Negative for cough, sputum production, shortness of breath and stridor.    Cardiovascular: Negative for chest pain.   Gastrointestinal: Positive for abdominal pain, nausea and vomiting.   Genitourinary: Negative for dysuria. "   Musculoskeletal: Negative for myalgias.   Skin: Negative for rash.   Neurological: Negative for dizziness, sensory change and focal weakness.       PFSH:  No change.      Physical Exam   Constitutional: She is oriented to person, place, and time. She appears unhealthy. She has a sickly appearance.   HENT:   Head: Normocephalic and atraumatic.   Right Ear: External ear normal.   Left Ear: External ear normal.   Nose: Nose normal.   Dry mucous membranes   Eyes: Conjunctivae and EOM are normal. Pupils are equal, round, and reactive to light. Right eye exhibits no discharge. Left eye exhibits no discharge. No scleral icterus.   Neck: Normal range of motion. Neck supple. No JVD present. No tracheal deviation present.   Cardiovascular: Normal rate, regular rhythm and intact distal pulses.    No murmur heard.  Pulmonary/Chest: No stridor. She has no wheezes. She has no rales.   Abdominal: Soft. Bowel sounds are normal. She exhibits no distension. There is tenderness (mild, difuse - improved). There is no rebound.   Musculoskeletal: She exhibits no edema.   No clubbing or cyanosis   Neurological: She is alert and oriented to person, place, and time. GCS score is 15.   No focal weakness   Skin: Skin is warm and dry. No rash noted. She is not diaphoretic.   Nursing note and vitals reviewed.      Respiratory:     Pulse Oximetry: 98 %  CXR images personally reviewed and compared to prior images    HemoDynamics:  Pulse: 87, Heart Rate (Monitored): 85  NIBP: 149/83       Neuro:    Fluids:  Intake/Output       06/05/18 0700 - 06/06/18 0659 06/06/18 0700 - 06/07/18 0659 06/07/18 0700 - 06/08/18 0659      7659-0114 0523-6428 Total 9243-3113 8779-2803 Total 6388-0278 4165-3955 Total       Intake    I.V.  1800  1500 3300  1500  1500 3000  --  -- --    IV Volume (LR) 1800 1500 3300 1500 1500 3000 -- -- --    Total Intake 1800 1500 3300 1500 1500 3000 -- -- --       Output    Urine  600  1000 1600  900  1200 2100  --  -- --    Number  of Times Voided 1 x 1 x 2 x 1 x 2 x 3 x -- -- --    Urine Void (mL) (non-catheter) 600 1000 3538 828 2913 2100 -- -- --    Emesis  400  600 1000  350  75 425  --  -- --    Emesis  350 75 425 -- -- --    Total Output 1000 1600 2600 1250 1275 2525 -- -- --       Net I/O     800 -100 700 250 225 475 -- -- --           Recent Labs      18   0338  18   03018   032   SODIUM  141  138  136   POTASSIUM  3.3*  3.3*  3.6   CHLORIDE  107  103  101   CO2  22  26  26   BUN  5*  6*  7*   CREATININE  0.46*  0.41*  0.36*   MAGNESIUM   --   1.7  1.9   PHOSPHORUS  2.5   --    --    CALCIUM  8.5  8.2*  7.8*       GI/Nutrition:    Liver Function  Recent Labs      18   03318   03018   032   ALTSGPT   --   18  22   ASTSGOT   --   24  30   ALKPHOSPHAT   --   83  91   TBILIRUBIN   --   0.5  0.6   GLUCOSE  197*  177*  278*       Heme:  Recent Labs      18   1027  18   03018   032   RBC  4.36  4.08*  4.01*   HEMOGLOBIN  12.6  11.8*  11.7*   HEMATOCRIT  37.7  35.3*  34.6*   PLATELETCT  166  177  167       Infectious Disease:  Temp  Av.7 °C (98 °F)  Min: 36.6 °C (97.8 °F)  Max: 36.8 °C (98.2 °F)    Recent Labs      18   1027  18   0300  18   032   WBC  7.6  5.7  5.0   NEUTSPOLYS  63.10  55.80  51.50   LYMPHOCYTES  25.40  32.70  35.20   MONOCYTES  9.30  9.80  9.10   EOSINOPHILS  1.20  0.90  3.40   BASOPHILS  0.70  0.40  0.40   ASTSGOT   --   24  30   ALTSGPT   --   18  22   ALKPHOSPHAT   --   83  91   TBILIRUBIN   --   0.5  0.6     Current Facility-Administered Medications   Medication Dose Frequency Provider Last Rate Last Dose   • lactated ringers infusion   Continuous George Benítez M.D. 125 mL/hr at 18     • haloperidol lactate (HALDOL) injection 5 mg  5 mg Q6HRS Oscar Paredes M.D.   5 mg at 18   • gabapentin (NEURONTIN) capsule 100 mg  100 mg BID Oscar Paredes M.D.       • MD ALERT...Adult ICU  Electrolyte Replacement per Pharmacy Protocol   PRN Cholo Alan Jr., D.O.       • insulin glargine (LANTUS) injection 40 Units  40 Units Q EVENING Cholo Alan Jr., D.O.   Stopped at 06/06/18 2100   • ketorolac (TORADOL) injection 15 mg  15 mg Q6HRS PRN Cholo Alan Jr. D.O.   15 mg at 06/05/18 2237   • sucralfate (CARAFATE) 1 GM/10ML suspension 1 g  1 g Q6HRS Cholo Alan Jr., D.O.   1 g at 06/07/18 0506   • tramadol (ULTRAM) 50 MG tablet  mg   mg Q6HRS PRN George Benítez M.D.   50 mg at 06/06/18 1226   • diphenhydrAMINE (BENADRYL) injection 25 mg  25 mg Q6HRS Cholo Alan Jr. D.O.   25 mg at 06/07/18 0506   • prochlorperazine (COMPAZINE) tablet 10 mg  10 mg Q6HRS PRN Cholo Alan Jr. D.O.       • prochlorperazine (COMPAZINE) injection 10 mg  10 mg Q6HRS PRN George Benítez M.D.   10 mg at 06/06/18 0444   • insulin regular (HUMULIN R) injection 3-14 Units  3-14 Units 4X/DAY ACHS Cholo Alan Jr., D.O.   4 Units at 06/07/18 0636    And   • glucose 4 g chewable tablet 16 g  16 g Q15 MIN PRN Cholo Alan Jr., D.O.        And   • dextrose 50% (D50W) injection 25 mL  25 mL Q15 MIN PRN Cholo Alan Jr., D.O.       • prochlorperazine (COMPAZINE) suppository 25 mg  25 mg Q12HRS PRN Cholo Alan Jr. D.O.       • famotidine (PEPCID) injection 20 mg  20 mg BID Cholo Alan Jr. D.O.   20 mg at 06/07/18 0732   • metoclopramide (REGLAN) injection 10 mg  10 mg Q6HRS Michael Gayle M.D.   10 mg at 06/07/18 0732   • atorvastatin (LIPITOR) tablet 20 mg  20 mg QHS Pa Urbina M.D.   Stopped at 06/02/18 2100   • vitamin D (cholecalciferol) tablet 2,000 Units  2,000 Units DAILY Pa Urbina M.D.   Stopped at 06/02/18 1430   • ferrous sulfate tablet 325 mg  325 mg DAILY Pa Urbina M.D.   Stopped at 06/02/18 1702   • senna-docusate (PERICOLACE or SENOKOT S) 8.6-50 MG per tablet 2 Tab  2 Tab BID Pa Urbina M.D.   Stopped at 06/02/18 2100    And    • polyethylene glycol/lytes (MIRALAX) PACKET 1 Packet  1 Packet QDAY PRN Pa Urbina M.D.        And   • magnesium hydroxide (MILK OF MAGNESIA) suspension 30 mL  30 mL QDAY PRN Pa Urbina M.D.        And   • bisacodyl (DULCOLAX) suppository 10 mg  10 mg QDAY PRN Pa Urbina M.D.       • enoxaparin (LOVENOX) inj 40 mg  40 mg DAILY Pa Urbina M.D.   40 mg at 06/07/18 0732   • ondansetron (ZOFRAN ODT) dispertab 4 mg  4 mg Q4HRS PRN Pa Urbina M.D.       • acetaminophen (TYLENOL) tablet 650 mg  650 mg Q6HRS PRN Pa Urbina M.D.       • labetalol (NORMODYNE,TRANDATE) injection 10 mg  10 mg Q4HRS PRN Pa Urbina M.D.         Last reviewed on 6/2/2018 11:56 AM by Blade Darden    Quality  Measures:  Labs reviewed, Medications reviewed and Radiology images reviewed  Pitt catheter: No Pitt  Central line in place: Need for access    DVT Prophylaxis: Enoxaparin (Lovenox)  DVT prophylaxis - mechanical: SCDs  Ulcer prophylaxis: Yes        Assessment and Plan:  DKA   - improved   - basal insulin, sliding scale insulin, accuchecks   - hypoglycemia protocol   - Had a long talk with the patient today regarding glycemic control and need for close follow-up with endocrinology, she states she is soon to be following up with [facility] and establishing an insulin pump regimen    Diabetic gastroparesis/Cyclic vomiting   - refractory   -Continue Benadryl, Compazine, reglan, haldol and erythromycin (if able to tolerate)   - when necessary Zofran   - No IV erythromycin in stock (national shortage)   - Symptomatic control of nausea and vomiting   - PO as tolerated, LR for IVF until full feeds obtained   - GI on board    DM type I   -Controlled on basal insulin and sliding scale insulin    Esophagitis   - As seen on CT   -Continue Pepcid    Hypokalemia   -Improved    Stable to transfer patient out of ICU today. [facility] will sign off at  transfer. Please call with questions.    Discussed patient condition and risk of morbidity and/or mortality with RN, RT, Pharmacy, , , Charge nurse / hot rounds, Patient and hospitalist.

## 2018-06-07 NOTE — CARE PLAN
Problem: Safety  Goal: Will remain free from injury  Patient will remain free from falls; safety interventions in place.    Problem: Bowel/Gastric:  Goal: Normal bowel function is maintained or improved  Patient's nausea/vomiting will decrease.

## 2018-06-07 NOTE — PROGRESS NOTES
Gastroenterology Progress Note     Author: Marino Black   Date & Time Created: 2018 1:20 PM    Chief Complaint:  GI Attending    Interval History:  Cc: nausea, vomiting  S: Patient examined and interviewed, course reviewed.   Course reviewed, and case discussed with Dr Paredes. Discussed with ICU RN.  Still with nausea and vomiting.  Nursing thinks the haldol helped but it was four doses.      Review of Systems:  ROS    Physical Exam:  Physical Exam    Labs:        Invalid input(s): [id number]      Recent Labs      18   0338  18   0300  18   0324   SODIUM  141  138  136   POTASSIUM  3.3*  3.3*  3.6   CHLORIDE  107  103  101   CO2  22  26  26   BUN  5*  6*  7*   CREATININE  0.46*  0.41*  0.36*   MAGNESIUM   --   1.7  1.9   PHOSPHORUS  2.5   --    --    CALCIUM  8.5  8.2*  7.8*     Recent Labs      18   0338  18   03018   0324   ALTSGPT   --   18  22   ASTSGOT   --   24  30   ALKPHOSPHAT   --   83  91   TBILIRUBIN   --   0.5  0.6   GLUCOSE  197*  177*  278*     Recent Labs      18   0300  18   0324   RBC  4.08*  4.01*   HEMOGLOBIN  11.8*  11.7*   HEMATOCRIT  35.3*  34.6*   PLATELETCT  177  167     Recent Labs      18   0300  18   0324   WBC  5.7  5.0   NEUTSPOLYS  55.80  51.50   LYMPHOCYTES  32.70  35.20   MONOCYTES  9.80  9.10   EOSINOPHILS  0.90  3.40   BASOPHILS  0.40  0.40   ASTSGOT  24  30   ALTSGPT  18  22   ALKPHOSPHAT  83  91   TBILIRUBIN  0.5  0.6     Hemodynamics:  Temp (24hrs), Av.7 °C (98 °F), Min:36.6 °C (97.8 °F), Max:36.8 °C (98.2 °F)  Temperature: 36.7 °C (98 °F)  Pulse  Av.9  Min: 85  Max: 160Heart Rate (Monitored): (!) 101  NIBP: 150/97     Respiratory:    Respiration: 18, Pulse Oximetry: 97 %        RUL Breath Sounds: Clear, RML Breath Sounds: Clear, RLL Breath Sounds: Diminished, NANCY Breath Sounds: Clear, LLL Breath Sounds: Diminished  Fluids:    Intake/Output Summary (Last 24 hours) at 18 1320  Last  data filed at 06/07/18 1000   Gross per 24 hour   Intake             2850 ml   Output             1375 ml   Net             1475 ml        GI/Nutrition:  Orders Placed This Encounter   Procedures   • DIET ORDER     Standing Status:   Standing     Number of Occurrences:   1     Order Specific Question:   Diet:     Answer:   Diabetic [3]     Medical Decision Making, by Problem:  Active Hospital Problems    Diagnosis   • *Gastroparesis due to DM (HCC) [E11.43, K31.84]   • Type 1 diabetes mellitus (HCC) [E10.9]   • Hypocalcemia [E83.51]   • Hypophosphatemia [E83.39]   • Diabetic ketoacidosis (HCC) [E13.10]     IMPRESSION(S):  1. Nausea and vomiting- little improvement if any.   2. Epigastric pain, abdominal  3. DKA  4. IDDM, poorly controlled  5. GB sludge  6. Abnormal CT scan with thickened esophagus, likely erosive esophagitis from vomiting  7. Hyperlipidemia     PLAN:  1. Resume  Haldol 5 mg IV q6 hours  X four doses  2. Reglan IV 10 mg q6  3. Antiemetics as needed including Zofran, Compazine, Benadryl  4. Start Neurontin 100 mg BID  5. Will need outpatient 4 hr gastric emptying study  6. Continue to hold on repeat EGD currently        Quality-Core Measures

## 2018-06-07 NOTE — CARE PLAN
Problem: Respiratory:  Goal: Respiratory status will improve    Intervention: Assess and monitor pulmonary status  Assessing pt's resp status. Keeping O2 sats 95 and above.

## 2018-06-07 NOTE — CARE PLAN
Problem: Pain Management  Goal: Pain level will decrease to patient's comfort goal    Intervention: Follow pain managment plan developed in collaboration with patient and Interdisciplinary Team  Assessing pt's pain and keeping it controlled.

## 2018-06-08 LAB
ALBUMIN SERPL BCP-MCNC: 2.5 G/DL (ref 3.2–4.9)
ALBUMIN/GLOB SERPL: 1.2 G/DL
ALP SERPL-CCNC: 82 U/L (ref 30–99)
ALT SERPL-CCNC: 19 U/L (ref 2–50)
ANION GAP SERPL CALC-SCNC: 8 MMOL/L (ref 0–11.9)
AST SERPL-CCNC: 17 U/L (ref 12–45)
BASOPHILS # BLD AUTO: 0.5 % (ref 0–1.8)
BASOPHILS # BLD: 0.03 K/UL (ref 0–0.12)
BILIRUB SERPL-MCNC: 0.4 MG/DL (ref 0.1–1.5)
BUN SERPL-MCNC: 8 MG/DL (ref 8–22)
CALCIUM SERPL-MCNC: 8.1 MG/DL (ref 8.5–10.5)
CHLORIDE SERPL-SCNC: 103 MMOL/L (ref 96–112)
CO2 SERPL-SCNC: 28 MMOL/L (ref 20–33)
CREAT SERPL-MCNC: 0.41 MG/DL (ref 0.5–1.4)
EOSINOPHIL # BLD AUTO: 0.23 K/UL (ref 0–0.51)
EOSINOPHIL NFR BLD: 3.9 % (ref 0–6.9)
ERYTHROCYTE [DISTWIDTH] IN BLOOD BY AUTOMATED COUNT: 40.3 FL (ref 35.9–50)
GLOBULIN SER CALC-MCNC: 2.1 G/DL (ref 1.9–3.5)
GLUCOSE BLD-MCNC: 216 MG/DL (ref 65–99)
GLUCOSE BLD-MCNC: 232 MG/DL (ref 65–99)
GLUCOSE BLD-MCNC: 246 MG/DL (ref 65–99)
GLUCOSE BLD-MCNC: 99 MG/DL (ref 65–99)
GLUCOSE SERPL-MCNC: 243 MG/DL (ref 65–99)
HCT VFR BLD AUTO: 35.2 % (ref 37–47)
HGB BLD-MCNC: 11.7 G/DL (ref 12–16)
IMM GRANULOCYTES # BLD AUTO: 0.02 K/UL (ref 0–0.11)
IMM GRANULOCYTES NFR BLD AUTO: 0.3 % (ref 0–0.9)
LYMPHOCYTES # BLD AUTO: 2.12 K/UL (ref 1–4.8)
LYMPHOCYTES NFR BLD: 36.2 % (ref 22–41)
MAGNESIUM SERPL-MCNC: 1.9 MG/DL (ref 1.5–2.5)
MCH RBC QN AUTO: 29.1 PG (ref 27–33)
MCHC RBC AUTO-ENTMCNC: 33.2 G/DL (ref 33.6–35)
MCV RBC AUTO: 87.6 FL (ref 81.4–97.8)
MONOCYTES # BLD AUTO: 0.52 K/UL (ref 0–0.85)
MONOCYTES NFR BLD AUTO: 8.9 % (ref 0–13.4)
NEUTROPHILS # BLD AUTO: 2.94 K/UL (ref 2–7.15)
NEUTROPHILS NFR BLD: 50.2 % (ref 44–72)
NRBC # BLD AUTO: 0 K/UL
NRBC BLD-RTO: 0 /100 WBC
PLATELET # BLD AUTO: 181 K/UL (ref 164–446)
PMV BLD AUTO: 9.9 FL (ref 9–12.9)
POTASSIUM SERPL-SCNC: 3.4 MMOL/L (ref 3.6–5.5)
PROT SERPL-MCNC: 4.6 G/DL (ref 6–8.2)
RBC # BLD AUTO: 4.02 M/UL (ref 4.2–5.4)
SODIUM SERPL-SCNC: 139 MMOL/L (ref 135–145)
WBC # BLD AUTO: 5.9 K/UL (ref 4.8–10.8)

## 2018-06-08 PROCEDURE — 93010 ELECTROCARDIOGRAM REPORT: CPT | Performed by: INTERNAL MEDICINE

## 2018-06-08 PROCEDURE — 80053 COMPREHEN METABOLIC PANEL: CPT

## 2018-06-08 PROCEDURE — 99233 SBSQ HOSP IP/OBS HIGH 50: CPT | Performed by: HOSPITALIST

## 2018-06-08 PROCEDURE — 700111 HCHG RX REV CODE 636 W/ 250 OVERRIDE (IP): Performed by: HOSPITALIST

## 2018-06-08 PROCEDURE — 700105 HCHG RX REV CODE 258: Performed by: HOSPITALIST

## 2018-06-08 PROCEDURE — 99233 SBSQ HOSP IP/OBS HIGH 50: CPT | Performed by: INTERNAL MEDICINE

## 2018-06-08 PROCEDURE — 700111 HCHG RX REV CODE 636 W/ 250 OVERRIDE (IP): Performed by: INTERNAL MEDICINE

## 2018-06-08 PROCEDURE — 770001 HCHG ROOM/CARE - MED/SURG/GYN PRIV*

## 2018-06-08 PROCEDURE — 700102 HCHG RX REV CODE 250 W/ 637 OVERRIDE(OP): Performed by: INTERNAL MEDICINE

## 2018-06-08 PROCEDURE — A9270 NON-COVERED ITEM OR SERVICE: HCPCS | Performed by: INTERNAL MEDICINE

## 2018-06-08 PROCEDURE — 85025 COMPLETE CBC W/AUTO DIFF WBC: CPT

## 2018-06-08 PROCEDURE — A9270 NON-COVERED ITEM OR SERVICE: HCPCS | Performed by: HOSPITALIST

## 2018-06-08 PROCEDURE — 700102 HCHG RX REV CODE 250 W/ 637 OVERRIDE(OP): Performed by: HOSPITALIST

## 2018-06-08 PROCEDURE — 82962 GLUCOSE BLOOD TEST: CPT | Mod: 91

## 2018-06-08 PROCEDURE — 83735 ASSAY OF MAGNESIUM: CPT

## 2018-06-08 PROCEDURE — 93005 ELECTROCARDIOGRAM TRACING: CPT | Performed by: HOSPITALIST

## 2018-06-08 RX ORDER — METOCLOPRAMIDE HYDROCHLORIDE 5 MG/ML
10 INJECTION INTRAMUSCULAR; INTRAVENOUS
Status: DISCONTINUED | OUTPATIENT
Start: 2018-06-09 | End: 2018-06-11 | Stop reason: HOSPADM

## 2018-06-08 RX ORDER — HALOPERIDOL 5 MG/ML
5 INJECTION INTRAMUSCULAR 3 TIMES DAILY
Status: DISCONTINUED | OUTPATIENT
Start: 2018-06-08 | End: 2018-06-11 | Stop reason: HOSPADM

## 2018-06-08 RX ORDER — HALOPERIDOL 5 MG/ML
5 INJECTION INTRAMUSCULAR ONCE
Status: COMPLETED | OUTPATIENT
Start: 2018-06-08 | End: 2018-06-08

## 2018-06-08 RX ORDER — MAGNESIUM SULFATE HEPTAHYDRATE 40 MG/ML
2 INJECTION, SOLUTION INTRAVENOUS ONCE
Status: COMPLETED | OUTPATIENT
Start: 2018-06-08 | End: 2018-06-08

## 2018-06-08 RX ORDER — POTASSIUM CHLORIDE 29.8 MG/ML
40 INJECTION INTRAVENOUS ONCE
Status: COMPLETED | OUTPATIENT
Start: 2018-06-08 | End: 2018-06-08

## 2018-06-08 RX ORDER — HALOPERIDOL 1 MG/1
5 TABLET ORAL 3 TIMES DAILY
Status: DISCONTINUED | OUTPATIENT
Start: 2018-06-08 | End: 2018-06-08

## 2018-06-08 RX ORDER — METOCLOPRAMIDE HYDROCHLORIDE 5 MG/5ML
10 SOLUTION ORAL
Status: DISCONTINUED | OUTPATIENT
Start: 2018-06-08 | End: 2018-06-08

## 2018-06-08 RX ORDER — METOCLOPRAMIDE HYDROCHLORIDE 5 MG/ML
10 INJECTION INTRAMUSCULAR; INTRAVENOUS ONCE
Status: COMPLETED | OUTPATIENT
Start: 2018-06-08 | End: 2018-06-08

## 2018-06-08 RX ADMIN — SODIUM CHLORIDE, POTASSIUM CHLORIDE, SODIUM LACTATE AND CALCIUM CHLORIDE: 600; 310; 30; 20 INJECTION, SOLUTION INTRAVENOUS at 03:03

## 2018-06-08 RX ADMIN — SUCRALFATE 1 G: 1 SUSPENSION ORAL at 06:28

## 2018-06-08 RX ADMIN — SUCRALFATE 1 G: 1 SUSPENSION ORAL at 12:00

## 2018-06-08 RX ADMIN — ENOXAPARIN SODIUM 40 MG: 100 INJECTION SUBCUTANEOUS at 08:12

## 2018-06-08 RX ADMIN — INSULIN HUMAN 4 UNITS: 100 INJECTION, SOLUTION PARENTERAL at 06:28

## 2018-06-08 RX ADMIN — GABAPENTIN 100 MG: 100 CAPSULE ORAL at 20:35

## 2018-06-08 RX ADMIN — HALOPERIDOL LACTATE 5 MG: 5 INJECTION, SOLUTION INTRAMUSCULAR at 20:35

## 2018-06-08 RX ADMIN — METOCLOPRAMIDE 10 MG: 5 INJECTION, SOLUTION INTRAMUSCULAR; INTRAVENOUS at 03:02

## 2018-06-08 RX ADMIN — STANDARDIZED SENNA CONCENTRATE AND DOCUSATE SODIUM 2 TABLET: 8.6; 5 TABLET, FILM COATED ORAL at 08:12

## 2018-06-08 RX ADMIN — GABAPENTIN 100 MG: 100 CAPSULE ORAL at 08:11

## 2018-06-08 RX ADMIN — HYDROMORPHONE HYDROCHLORIDE 0.5 MG: 2 INJECTION INTRAMUSCULAR; INTRAVENOUS; SUBCUTANEOUS at 18:08

## 2018-06-08 RX ADMIN — POTASSIUM CHLORIDE 40 MEQ: 400 INJECTION, SOLUTION INTRAVENOUS at 11:25

## 2018-06-08 RX ADMIN — INSULIN HUMAN 4 UNITS: 100 INJECTION, SOLUTION PARENTERAL at 17:27

## 2018-06-08 RX ADMIN — VITAMIN D, TAB 1000IU (100/BT) 2000 UNITS: 25 TAB at 08:12

## 2018-06-08 RX ADMIN — FAMOTIDINE 20 MG: 10 INJECTION, SOLUTION INTRAVENOUS at 20:35

## 2018-06-08 RX ADMIN — HALOPERIDOL LACTATE 5 MG: 5 INJECTION, SOLUTION INTRAMUSCULAR at 14:23

## 2018-06-08 RX ADMIN — SODIUM CHLORIDE, POTASSIUM CHLORIDE, SODIUM LACTATE AND CALCIUM CHLORIDE: 600; 310; 30; 20 INJECTION, SOLUTION INTRAVENOUS at 08:26

## 2018-06-08 RX ADMIN — METOCLOPRAMIDE 10 MG: 5 INJECTION, SOLUTION INTRAMUSCULAR; INTRAVENOUS at 08:12

## 2018-06-08 RX ADMIN — METOCLOPRAMIDE 10 MG: 5 INJECTION, SOLUTION INTRAMUSCULAR; INTRAVENOUS at 17:46

## 2018-06-08 RX ADMIN — ATORVASTATIN CALCIUM 20 MG: 20 TABLET, FILM COATED ORAL at 20:35

## 2018-06-08 RX ADMIN — HYDROMORPHONE HYDROCHLORIDE 0.5 MG: 2 INJECTION INTRAMUSCULAR; INTRAVENOUS; SUBCUTANEOUS at 08:25

## 2018-06-08 RX ADMIN — INSULIN HUMAN 4 UNITS: 100 INJECTION, SOLUTION PARENTERAL at 20:45

## 2018-06-08 RX ADMIN — MAGNESIUM SULFATE IN WATER 2 G: 40 INJECTION, SOLUTION INTRAVENOUS at 14:07

## 2018-06-08 RX ADMIN — METOCLOPRAMIDE HYDROCHLORIDE 10 MG: 5 SOLUTION ORAL at 12:00

## 2018-06-08 RX ADMIN — SUCRALFATE 1 G: 1 SUSPENSION ORAL at 17:22

## 2018-06-08 RX ADMIN — HALOPERIDOL LACTATE 5 MG: 5 INJECTION, SOLUTION INTRAMUSCULAR at 08:12

## 2018-06-08 RX ADMIN — SUCRALFATE 1 G: 1 SUSPENSION ORAL at 00:00

## 2018-06-08 RX ADMIN — FAMOTIDINE 20 MG: 10 INJECTION, SOLUTION INTRAVENOUS at 08:12

## 2018-06-08 RX ADMIN — HYDROMORPHONE HYDROCHLORIDE 0.5 MG: 2 INJECTION INTRAMUSCULAR; INTRAVENOUS; SUBCUTANEOUS at 14:04

## 2018-06-08 RX ADMIN — METOCLOPRAMIDE HYDROCHLORIDE 10 MG: 5 SOLUTION ORAL at 14:09

## 2018-06-08 RX ADMIN — SODIUM CHLORIDE, POTASSIUM CHLORIDE, SODIUM LACTATE AND CALCIUM CHLORIDE: 600; 310; 30; 20 INJECTION, SOLUTION INTRAVENOUS at 17:48

## 2018-06-08 ASSESSMENT — ENCOUNTER SYMPTOMS
MYALGIAS: 0
ABDOMINAL PAIN: 1
SENSORY CHANGE: 0
CHILLS: 0
VOMITING: 1
COUGH: 0
SPUTUM PRODUCTION: 0
BACK PAIN: 0
STRIDOR: 0
DIARRHEA: 0
NAUSEA: 1
SHORTNESS OF BREATH: 0
CONSTIPATION: 0
BLOOD IN STOOL: 0
FOCAL WEAKNESS: 0
DIZZINESS: 0
LOSS OF CONSCIOUSNESS: 0
HEADACHES: 0
FEVER: 0

## 2018-06-08 ASSESSMENT — PAIN SCALES - GENERAL
PAINLEVEL_OUTOF10: 8
PAINLEVEL_OUTOF10: 8
PAINLEVEL_OUTOF10: 0
PAINLEVEL_OUTOF10: 2
PAINLEVEL_OUTOF10: 6
PAINLEVEL_OUTOF10: 4
PAINLEVEL_OUTOF10: 8
PAINLEVEL_OUTOF10: 9
PAINLEVEL_OUTOF10: 0
PAINLEVEL_OUTOF10: 8
PAINLEVEL_OUTOF10: 5
PAINLEVEL_OUTOF10: 0
PAINLEVEL_OUTOF10: 0
PAINLEVEL_OUTOF10: 2

## 2018-06-08 NOTE — PROGRESS NOTES
Gastroenterology Progress Note     Author: Marino Black   Date & Time Created: 2018 7:39 AM    Chief Complaint:  GI Attending    Interval History:  Cc: nausea, vomiting  S: Patient examined and interviewed, course reviewed.   Course reviewed, and case discussed with ICU RN.  Still with nausea and vomiting but better.  Had small amount solid food last hs.     Review of Systems:  ROS    Physical Exam:  Physical Exam    Labs:        Invalid input(s): [id number]      Recent Labs      18   0300  18   0324  18   0307   SODIUM  138  136  139   POTASSIUM  3.3*  3.6  3.4*   CHLORIDE  103  101  103   CO2  26  26  28   BUN  6*  7*  8   CREATININE  0.41*  0.36*  0.41*   MAGNESIUM  1.7  1.9  1.9   CALCIUM  8.2*  7.8*  8.1*     Recent Labs      18   0300  18   0324  18   030   ALTSGPT  18  22  19   ASTSGOT  24  30  17   ALKPHOSPHAT  83  91  82   TBILIRUBIN  0.5  0.6  0.4   GLUCOSE  177*  278*  243*     Recent Labs      18   0300  18   0324  18   0307   RBC  4.08*  4.01*  4.02*   HEMOGLOBIN  11.8*  11.7*  11.7*   HEMATOCRIT  35.3*  34.6*  35.2*   PLATELETCT  177  167  181     Recent Labs      18   0300  18   0324  18   0307   WBC  5.7  5.0  5.9   NEUTSPOLYS  55.80  51.50  50.20   LYMPHOCYTES  32.70  35.20  36.20   MONOCYTES  9.80  9.10  8.90   EOSINOPHILS  0.90  3.40  3.90   BASOPHILS  0.40  0.40  0.50   ASTSGOT  24  30  17   ALTSGPT  18  22  19   ALKPHOSPHAT  83  91  82   TBILIRUBIN  0.5  0.6  0.4     Hemodynamics:  Temp (24hrs), Av.6 °C (97.9 °F), Min:36.2 °C (97.2 °F), Max:36.9 °C (98.4 °F)  Temperature: 36.7 °C (98.1 °F)  Pulse  Av.8  Min: 85  Max: 160Heart Rate (Monitored): 92  NIBP: 131/87     Respiratory:    Respiration: 19, Pulse Oximetry: 98 %        RUL Breath Sounds: Clear, RML Breath Sounds: Diminished, RLL Breath Sounds: Diminished, NANCY Breath Sounds: Clear, LLL Breath Sounds: Diminished  Fluids:    Intake/Output Summary  (Last 24 hours) at 06/07/18 1320  Last data filed at 06/07/18 1000   Gross per 24 hour   Intake             2850 ml   Output             1375 ml   Net             1475 ml     Weight: 90.6 kg (199 lb 11.8 oz)  GI/Nutrition:  Orders Placed This Encounter   Procedures   • DIET ORDER     Standing Status:   Standing     Number of Occurrences:   1     Order Specific Question:   Diet:     Answer:   Diabetic [3]     Medical Decision Making, by Problem:  Active Hospital Problems    Diagnosis   • *Gastroparesis due to DM (HCC) [E11.43, K31.84]   • Type 1 diabetes mellitus (HCC) [E10.9]   • Hypocalcemia [E83.51]   • Hypophosphatemia [E83.39]   • Diabetic ketoacidosis (HCC) [E13.10]     IMPRESSION(S):  1. Nausea and vomiting- some improvement  2. Epigastric pain, abdominal  3. DKA  4. IDDM, poorly controlled  5. GB sludge  6. Abnormal CT scan with thickened esophagus, likely erosive esophagitis from vomiting  7. Hyperlipidemia     PLAN:  1. Trial of oral haldol and reglan  2.  Continue soft diet      Quality-Core Measures

## 2018-06-08 NOTE — PROGRESS NOTES
"Pulmonary Critical Care Progress Note      Admission Date: 6/2/2018    Chief Complaint:   DKA    HPI: \"Alis Sparks is a 27 y.o. female with a past medical history of type 1 DM with previous hospitalizations for DKA complicated by gastroparesis who presented to the ER today with complaints of nausea and vomiting since last night.  She notes that she has had elevated blood sugars for the past week.  She notes that the nausea and vomiting is associated with crampy generalized abdominal pain.  She had an episode of watery diarrhea 2 days ago.  She notes that the vomitus is bilious in nature without coffee ground emesis or hematemesis.  No blood in the diarrhea.  She notes that her symptoms are the same from her previous DKA episodes.  She had had lightheadedness without syncope.  No cough, chest pain, recent infections, urinary symptoms, or recent sick contacts.     She was initially admitted to the CDU as her labs were not profoundly consistent with DKA; however, upon further checking and continued nausea/vomiting, the patient went into severe DKA requiring transfer into the ICU with insulin drip infusing.\"    Interval Events:  24 hour interval history reviewed  Reason for visit: DKA   - tolerated 2 bites of toast   - Neuro: AOx4   - HR: Sinus rhythm to sinus tach   - BP: Normotensive   - GI: continue nausea   - UOP: good   - Pitt: no   - Tm: afeb   - Lines: CVC   - PPx: GI pepcid, DVT lovenox   - 2L    Yesterday   - Nausea and emesis   - Neuro: AOx4   - HR: 90s-120s SR-ST   - BP: 100s-160s systolic   - GI: emesis, refusing PO   - UOP: adequate   - Pitt: no   - Tm: afeb   - Lines: CVC   - PPx: GI pepcid, DVT lovenox   - 2L NC    Review of Systems   Constitutional: Negative for chills and fever.   Respiratory: Negative for cough, sputum production, shortness of breath and stridor.    Cardiovascular: Negative for chest pain.   Gastrointestinal: Positive for abdominal pain (Improving), nausea and vomiting. "   Genitourinary: Negative for dysuria.   Musculoskeletal: Negative for myalgias.   Skin: Negative for rash.   Neurological: Negative for dizziness, sensory change and focal weakness.       PFSH:  No change.      Physical Exam   Constitutional: She is oriented to person, place, and time. She appears unhealthy. She has a sickly appearance.   HENT:   Head: Normocephalic and atraumatic.   Right Ear: External ear normal.   Left Ear: External ear normal.   Nose: Nose normal.   Dry mucous membranes   Eyes: Conjunctivae and EOM are normal. Pupils are equal, round, and reactive to light. Right eye exhibits no discharge. Left eye exhibits no discharge. No scleral icterus.   Neck: Normal range of motion. Neck supple. No JVD present. No tracheal deviation present.   Cardiovascular: Normal rate, regular rhythm and intact distal pulses.    No murmur heard.  Pulmonary/Chest: No stridor. She has no wheezes. She has no rales.   Abdominal: Soft. Bowel sounds are normal. She exhibits no distension. There is tenderness (mild, difuse). There is no rebound.   Musculoskeletal: She exhibits no edema.   No clubbing or cyanosis   Neurological: She is alert and oriented to person, place, and time. GCS score is 15.   No focal weakness   Skin: Skin is warm and dry. No rash noted. She is not diaphoretic.   Nursing note and vitals reviewed.  No significant change from 6/7/2018    Respiratory:     Pulse Oximetry: 97 %  CXR images personally reviewed and compared to prior images    HemoDynamics:  Pulse: (!) 104, Heart Rate (Monitored): 93  NIBP: 140/89       Neuro:    Fluids:  Intake/Output       06/06/18 0700 - 06/07/18 0659 06/07/18 0700 - 06/08/18 0659 06/08/18 0700 - 06/09/18 0659      [id number] 6046-4870 Total 9267-1689 9975-1858 Total 3122-8734 2556-7485 Total       Intake    P.O.  --  -- --  --  50 50  360  -- 360    P.O. -- -- -- -- 50 50 360 -- 360    I.V.  1500  1500 3000  1650  1500 3150  250  -- 250    IV Piggyback Volume (IV Piggyback)  -- -- -- 150 -- 150 -- -- --    IV Volume (LR) 1500 1500 3000 1500 1500 3000 250 -- 250    Total Intake 1500 1500 3000 1650 1550 3200 610 -- 610       Output    Urine  900  1200 2100  1300  -- 1300  --  -- --    Number of Times Voided 1 x 2 x 3 x 1 x -- 1 x -- -- --    Urine Void (mL) (non-catheter) 900 1200 2100 1300 -- 1300 -- -- --    Emesis  350  75 425  350  -- 350  --  -- --    Emesis 350 75 425 350 -- 350 -- -- --    Emesis - Number of Times -- -- -- 3 x -- 3 x -- -- --    Stool  --  -- --  --  -- --  --  -- --    Number of Times Stooled -- -- -- -- -- -- 0 x -- 0 x    Total Output 1250 1275 2525 1650 -- 1650 -- -- --       Net I/O     250 225 475 0 1550 1550 610 -- 610        Weight: 90.6 kg (199 lb 11.8 oz)  Recent Labs      18   SODIUM  138  136  139   POTASSIUM  3.3*  3.6  3.4*   CHLORIDE  103  101  103   CO2  26  26  28   BUN  6*  7*  8   CREATININE  0.41*  0.36*  0.41*   MAGNESIUM  1.7  1.9  1.9   CALCIUM  8.2*  7.8*  8.1*       GI/Nutrition:    Liver Function  Recent Labs      18   ALTSGPT  18  22  19   ASTSGOT  24  30  17   ALKPHOSPHAT  83  91  82   TBILIRUBIN  0.5  0.6  0.4   GLUCOSE  177*  278*  243*       Heme:  Recent Labs      18   RBC  4.08*  4.01*  4.02*   HEMOGLOBIN  11.8*  11.7*  11.7*   HEMATOCRIT  35.3*  34.6*  35.2*   PLATELETCT  177  167  181       Infectious Disease:  Temp  Av.6 °C (97.8 °F)  Min: 36.2 °C (97.2 °F)  Max: 36.9 °C (98.4 °F)    Recent Labs      18   0300  18   0324  18   0307   WBC  5.7  5.0  5.9   NEUTSPOLYS  55.80  51.50  50.20   LYMPHOCYTES  32.70  35.20  36.20   MONOCYTES  9.80  9.10  8.90   EOSINOPHILS  0.90  3.40  3.90   BASOPHILS  0.40  0.40  0.50   ASTSGOT  24  30  17   ALTSGPT  18  22  19   ALKPHOSPHAT  83  91  82   TBILIRUBIN  0.5  0.6  0.4     Current Facility-Administered Medications   Medication Dose  Frequency Provider Last Rate Last Dose   • haloperidol (HALDOL) tablet 5 mg  5 mg TID Marino Black M.D.   Stopped at 06/08/18 0900   • metoclopramide (REGLAN) 5 MG/5ML solution 10 mg  10 mg TID AC Marino Black M.D.       • scopolamine (TRANSDERM-SCOP) patch 1 Patch  1 Patch Q72HRS Fermin Gloria D.OHollis   1 Patch at 06/07/18 1013   • HYDROmorphone (DILAUDID) injection 0.25-0.5 mg  0.25-0.5 mg Q2HRS PRN Fermin Glorai D.OHollis   0.5 mg at 06/08/18 0825   • LORazepam (ATIVAN) injection 0.5 mg  0.5 mg Q6HRS PRN Marino Black M.D.       • lactated ringers infusion   Continuous George Benítez M.D. 125 mL/hr at 06/08/18 0826     • gabapentin (NEURONTIN) capsule 100 mg  100 mg BID Oscar Paredes M.D.   100 mg at 06/08/18 0811   • insulin glargine (LANTUS) injection 40 Units  40 Units Q EVENING Cholo Alan Jr. D.O.   40 Units at 06/07/18 2134   • ketorolac (TORADOL) injection 15 mg  15 mg Q6HRS PRN Cholo Alan Jr. D.O.   15 mg at 06/07/18 1050   • sucralfate (CARAFATE) 1 GM/10ML suspension 1 g  1 g Q6HRS Cholo Alan Jr., D.O.   1 g at 06/08/18 0628   • tramadol (ULTRAM) 50 MG tablet  mg   mg Q6HRS PRN George Benítez M.D.   50 mg at 06/06/18 1226   • prochlorperazine (COMPAZINE) tablet 10 mg  10 mg Q6HRS PRN Cholo Alan Jr. D.O.       • prochlorperazine (COMPAZINE) injection 10 mg  10 mg Q6HRS PRN George Benítez M.D.   10 mg at 06/07/18 1504   • insulin regular (HUMULIN R) injection 3-14 Units  3-14 Units 4X/DAY ACHS Cholo Alan Jr., D.O.   4 Units at 06/08/18 0628    And   • glucose 4 g chewable tablet 16 g  16 g Q15 MIN PRN Cholo Alan Jr., D.O.        And   • dextrose 50% (D50W) injection 25 mL  25 mL Q15 MIN PRN Cholo Alan Jr., D.O.       • prochlorperazine (COMPAZINE) suppository 25 mg  25 mg Q12HRS PRN Cholo Alan Jr., D.O.       • famotidine (PEPCID) injection 20 mg  20 mg BID Cholo Alan Jr., D.O.   20 mg at 06/08/18 0812   •  atorvastatin (LIPITOR) tablet 20 mg  20 mg QHS Pa Urbina M.D.   20 mg at 06/07/18 2130   • vitamin D (cholecalciferol) tablet 2,000 Units  2,000 Units DAILY Pa Urbina M.D.   2,000 Units at 06/08/18 0812   • senna-docusate (PERICOLACE or SENOKOT S) 8.6-50 MG per tablet 2 Tab  2 Tab BID Pa Urbina M.D.   2 Tab at 06/08/18 0812    And   • polyethylene glycol/lytes (MIRALAX) PACKET 1 Packet  1 Packet QDAY PRN Pa Urbina M.D.        And   • magnesium hydroxide (MILK OF MAGNESIA) suspension 30 mL  30 mL QDAY PRN Pa Urbina M.D.        And   • bisacodyl (DULCOLAX) suppository 10 mg  10 mg QDAY PRN Pa Urbina M.D.       • enoxaparin (LOVENOX) inj 40 mg  40 mg DAILY Pa Urbina M.D.   40 mg at 06/08/18 0812   • ondansetron (ZOFRAN ODT) dispertab 4 mg  4 mg Q4HRS PRN Pa Urbina M.D.       • acetaminophen (TYLENOL) tablet 650 mg  650 mg Q6HRS PRN Pa Urbina M.D.       • labetalol (NORMODYNE,TRANDATE) injection 10 mg  10 mg Q4HRS PRN Pa Urbina M.D.         Last reviewed on 6/2/2018 11:56 AM by Polina Calvillo LESLIE    Quality  Measures:   Labs reviewed, Medications reviewed and Radiology images reviewed   Pitt catheter: No Pitt   Central line in place: Need for access     DVT Prophylaxis: Enoxaparin (Lovenox)   DVT prophylaxis - mechanical: SCDs   Ulcer prophylaxis: Yes        Assessment and Plan:  DKA   -Resolved   - basal insulin, sliding scale insulin, accuchecks   - hypoglycemia protocol      Diabetic gastroparesis/Cyclic vomiting   -Proven to be refractory however somewhat improved   -Continue Benadryl, Compazine, Reglan, Haldol, Zofran   - when necessary Zofran   - No IV erythromycin in stock (national shortage)   - Symptomatic control of nausea and vomiting   - PO as tolerated, LR for IVF until full feeds obtained   - GI on board    DM type I   - basal insulin, sliding scale insulin, accuchecks   - hypoglycemia  protocol    Esophagitis   - As seen on CT   -Continue Pepcid    Hypokalemia   -Replete    Stable to transfer patient out of ICU today. [facility] will sign off at transfer. Please call with questions.      Discussed patient condition and risk of morbidity and/or mortality with RN, RT, Pharmacy, , , Charge nurse / hot rounds, Patient and hospitalist.

## 2018-06-08 NOTE — CARE PLAN
Problem: Respiratory:  Goal: Respiratory status will improve    Intervention: Assess and monitor pulmonary status  Pt wears NC 2L when sleeping. Maintaining oxygen levels above 90%.

## 2018-06-08 NOTE — CARE PLAN
Problem: Pain Management  Goal: Pain level will decrease to patient's comfort goal    Intervention: Follow pain managment plan developed in collaboration with patient and Interdisciplinary Team  Monitoring pt's pain Q2h. Controlling pain with medication and rest.

## 2018-06-08 NOTE — PROGRESS NOTES
Hospital Medicine Daily Progress Note    Date of Service  6/8/2018    Chief Complaint  27 y.o. female admitted 6/2/2018 with PMHx of brittle TIDM, diabetic gastroparesis, frequent admissions for DKA      Interval Problem Update  conts to have N  Po intake is very low; only a few bights of po  LR 125ml/hr  2.0 LNC  N/V and abd pain this am.  This afternoon still present but seems less.  Able to take some po and hold it down.      Consultants/Specialty  GI  Pulmonology    Disposition  OK to floor    Review of Systems  Review of Systems   Constitutional: Negative for chills and fever.   Respiratory: Negative for cough and shortness of breath.    Cardiovascular: Negative for chest pain.   Gastrointestinal: Positive for abdominal pain, nausea and vomiting. Negative for blood in stool, constipation, diarrhea and melena.   Musculoskeletal: Negative for back pain.   Skin: Negative for rash.   Neurological: Negative for dizziness, loss of consciousness and headaches.        Physical Exam  Blood Pressure: 118/75   Temperature: 36.8 °C (98.2 °F)   Pulse: 95   Respiration: 18   Pulse Oximetry: 98 %     Physical Exam   Constitutional: She is oriented to person, place, and time. She appears well-developed and well-nourished. No distress.   HENT:   Head: Normocephalic and atraumatic.   Neck: No JVD present.   Cardiovascular: Normal rate and regular rhythm.    Pulmonary/Chest: Effort normal. No stridor. No respiratory distress. She has no wheezes. She has no rales.   Abdominal: Soft. There is tenderness. There is no rebound and no guarding.   Musculoskeletal: She exhibits no edema.   Neurological: She is oriented to person, place, and time.   Skin: Skin is warm and dry. No rash noted. She is not diaphoretic. There is pallor.   Psychiatric: She has a normal mood and affect. Thought content normal.   Nursing note and vitals reviewed.      Fluids    Intake/Output Summary (Last 24 hours) at 06/08/18 0615  Last data filed at 06/08/18  0400   Gross per 24 hour   Intake             2950 ml   Output             1650 ml   Net             1300 ml       Laboratory  Recent Labs      06/06/18   0300  06/07/18   0324  06/08/18   0307   WBC  5.7  5.0  5.9   RBC  4.08*  4.01*  4.02*   HEMOGLOBIN  11.8*  11.7*  11.7*   HEMATOCRIT  35.3*  34.6*  35.2*   MCV  86.5  86.3  87.6   MCH  28.9  29.2  29.1   MCHC  33.4*  33.8  33.2*   RDW  41.1  39.6  40.3   PLATELETCT  177  167  181   MPV  10.1  9.8  9.9     Recent Labs      06/06/18   0300  06/07/18   0324  06/08/18   0307   SODIUM  138  136  139   POTASSIUM  3.3*  3.6  3.4*   CHLORIDE  103  101  103   CO2  26  26  28   GLUCOSE  177*  278*  243*   BUN  6*  7*  8   CREATININE  0.41*  0.36*  0.41*   CALCIUM  8.2*  7.8*  8.1*                   Imaging  ECHOCARDIOGRAM COMP W/O CONT   Final Result      DX-CHEST-LIMITED (1 VIEW)   Final Result      1.  No evidence of pneumothorax following RIGHT neck catheter placement      CT-ABDOMEN-PELVIS WITH   Final Result         1.  Thickening of the distal esophageal wall, new since prior study, consider component of esophagitis. Could be followed up with endoscopy for further evaluation as clinically appropriate.   2.  Hepatomegaly      DX-CHEST-PORTABLE (1 VIEW)   Final Result      No acute cardiac or pulmonary abnormalities are identified.           Assessment/Plan  * Gastroparesis due to DM (HCC)- (present on admission)   Assessment & Plan    GI consulted  Scheduled reglan  Gabapentin 100mg bid  Prn zofran, compazine  No planned interventions at present  Monitor on telemetry with above multiple agents that could prolong her QT        Type 1 diabetes mellitus (HCC)- (present on admission)   Assessment & Plan    Uncontrolled, HbA1c 12.5  Insulin and sliding scale  Encourage compliance with insulin        Hypophosphatemia- (present on admission)   Assessment & Plan    Replete        Hypocalcemia- (present on admission)   Assessment & Plan    Repleted        Diabetic ketoacidosis  (HCC)- (present on admission)   Assessment & Plan    Acidosis resolved  Lantus and sliding scale  Follow CO2 and AG closely

## 2018-06-08 NOTE — CARE PLAN
Problem: Knowledge Deficit  Goal: Knowledge of disease process/condition, treatment plan, diagnostic tests, and medications will improve    Intervention: Assess knowledge level of disease process/condition, treatment plan, diagnostic tests, and medications  Pt and family educated on POC and goals. Pt and family verbalized understanding, answered all questions, no further needs at this time, pt resting comfortably.       Problem: Pain Management  Goal: Pain level will decrease to patient's comfort goal    Intervention: Follow pain managment plan developed in collaboration with patient and Interdisciplinary Team  Pt complaining of 8/10 pain, medicated as per MD orders (see MAR). Provided relaxation techniques, rest, and repositioning the pt.       Problem: Respiratory:  Goal: Respiratory status will improve    Intervention: Assess and monitor pulmonary status  Collaborating with RT and Interdisciplinary team on pt's treatment measures to improve respiratory function.

## 2018-06-09 ENCOUNTER — APPOINTMENT (OUTPATIENT)
Dept: RADIOLOGY | Facility: MEDICAL CENTER | Age: 28
DRG: 638 | End: 2018-06-09
Attending: INTERNAL MEDICINE
Payer: MEDICAID

## 2018-06-09 LAB
ALBUMIN SERPL BCP-MCNC: 2.7 G/DL (ref 3.2–4.9)
ALBUMIN/GLOB SERPL: 1.6 G/DL
ALP SERPL-CCNC: 86 U/L (ref 30–99)
ALT SERPL-CCNC: 17 U/L (ref 2–50)
ANION GAP SERPL CALC-SCNC: 6 MMOL/L (ref 0–11.9)
AST SERPL-CCNC: 15 U/L (ref 12–45)
BASOPHILS # BLD AUTO: 0.6 % (ref 0–1.8)
BASOPHILS # BLD: 0.03 K/UL (ref 0–0.12)
BILIRUB SERPL-MCNC: 0.5 MG/DL (ref 0.1–1.5)
BUN SERPL-MCNC: 8 MG/DL (ref 8–22)
CALCIUM SERPL-MCNC: 8.1 MG/DL (ref 8.5–10.5)
CHLORIDE SERPL-SCNC: 106 MMOL/L (ref 96–112)
CO2 SERPL-SCNC: 31 MMOL/L (ref 20–33)
CREAT SERPL-MCNC: 0.48 MG/DL (ref 0.5–1.4)
EKG IMPRESSION: NORMAL
EOSINOPHIL # BLD AUTO: 0.25 K/UL (ref 0–0.51)
EOSINOPHIL NFR BLD: 5.4 % (ref 0–6.9)
ERYTHROCYTE [DISTWIDTH] IN BLOOD BY AUTOMATED COUNT: 40.5 FL (ref 35.9–50)
GLOBULIN SER CALC-MCNC: 1.7 G/DL (ref 1.9–3.5)
GLUCOSE BLD-MCNC: 185 MG/DL (ref 65–99)
GLUCOSE BLD-MCNC: 229 MG/DL (ref 65–99)
GLUCOSE BLD-MCNC: 302 MG/DL (ref 65–99)
GLUCOSE BLD-MCNC: 396 MG/DL (ref 65–99)
GLUCOSE SERPL-MCNC: 132 MG/DL (ref 65–99)
HCT VFR BLD AUTO: 33.3 % (ref 37–47)
HGB BLD-MCNC: 11.1 G/DL (ref 12–16)
IMM GRANULOCYTES # BLD AUTO: 0.03 K/UL (ref 0–0.11)
IMM GRANULOCYTES NFR BLD AUTO: 0.6 % (ref 0–0.9)
LYMPHOCYTES # BLD AUTO: 1.89 K/UL (ref 1–4.8)
LYMPHOCYTES NFR BLD: 40.8 % (ref 22–41)
MAGNESIUM SERPL-MCNC: 1.8 MG/DL (ref 1.5–2.5)
MCH RBC QN AUTO: 29.2 PG (ref 27–33)
MCHC RBC AUTO-ENTMCNC: 33.3 G/DL (ref 33.6–35)
MCV RBC AUTO: 87.6 FL (ref 81.4–97.8)
MONOCYTES # BLD AUTO: 0.53 K/UL (ref 0–0.85)
MONOCYTES NFR BLD AUTO: 11.4 % (ref 0–13.4)
NEUTROPHILS # BLD AUTO: 1.9 K/UL (ref 2–7.15)
NEUTROPHILS NFR BLD: 41.2 % (ref 44–72)
NRBC # BLD AUTO: 0 K/UL
NRBC BLD-RTO: 0 /100 WBC
PLATELET # BLD AUTO: 177 K/UL (ref 164–446)
PMV BLD AUTO: 9.7 FL (ref 9–12.9)
POTASSIUM SERPL-SCNC: 3.6 MMOL/L (ref 3.6–5.5)
PROT SERPL-MCNC: 4.4 G/DL (ref 6–8.2)
RBC # BLD AUTO: 3.8 M/UL (ref 4.2–5.4)
SODIUM SERPL-SCNC: 143 MMOL/L (ref 135–145)
WBC # BLD AUTO: 4.6 K/UL (ref 4.8–10.8)

## 2018-06-09 PROCEDURE — 99233 SBSQ HOSP IP/OBS HIGH 50: CPT | Performed by: HOSPITALIST

## 2018-06-09 PROCEDURE — 700105 HCHG RX REV CODE 258: Performed by: HOSPITALIST

## 2018-06-09 PROCEDURE — 160035 HCHG PACU - 1ST 60 MINS PHASE I: Performed by: INTERNAL MEDICINE

## 2018-06-09 PROCEDURE — 0DB68ZX EXCISION OF STOMACH, VIA NATURAL OR ARTIFICIAL OPENING ENDOSCOPIC, DIAGNOSTIC: ICD-10-PCS | Performed by: SURGERY

## 2018-06-09 PROCEDURE — 500066 HCHG BITE BLOCK, ECT: Performed by: INTERNAL MEDICINE

## 2018-06-09 PROCEDURE — 700111 HCHG RX REV CODE 636 W/ 250 OVERRIDE (IP)

## 2018-06-09 PROCEDURE — 700102 HCHG RX REV CODE 250 W/ 637 OVERRIDE(OP): Performed by: INTERNAL MEDICINE

## 2018-06-09 PROCEDURE — 700111 HCHG RX REV CODE 636 W/ 250 OVERRIDE (IP): Performed by: INTERNAL MEDICINE

## 2018-06-09 PROCEDURE — C1726 CATH, BAL DIL, NON-VASCULAR: HCPCS | Performed by: INTERNAL MEDICINE

## 2018-06-09 PROCEDURE — A9270 NON-COVERED ITEM OR SERVICE: HCPCS | Performed by: HOSPITALIST

## 2018-06-09 PROCEDURE — A9270 NON-COVERED ITEM OR SERVICE: HCPCS | Performed by: INTERNAL MEDICINE

## 2018-06-09 PROCEDURE — 83735 ASSAY OF MAGNESIUM: CPT

## 2018-06-09 PROCEDURE — 85025 COMPLETE CBC W/AUTO DIFF WBC: CPT

## 2018-06-09 PROCEDURE — 0D778ZZ DILATION OF STOMACH, PYLORUS, VIA NATURAL OR ARTIFICIAL OPENING ENDOSCOPIC: ICD-10-PCS | Performed by: SURGERY

## 2018-06-09 PROCEDURE — 160009 HCHG ANES TIME/MIN: Performed by: INTERNAL MEDICINE

## 2018-06-09 PROCEDURE — 160002 HCHG RECOVERY MINUTES (STAT): Performed by: INTERNAL MEDICINE

## 2018-06-09 PROCEDURE — 88305 TISSUE EXAM BY PATHOLOGIST: CPT | Mod: 59

## 2018-06-09 PROCEDURE — 160048 HCHG OR STATISTICAL LEVEL 1-5: Performed by: INTERNAL MEDICINE

## 2018-06-09 PROCEDURE — 160029 HCHG SURGERY MINUTES - 1ST 30 MINS LEVEL 4: Performed by: INTERNAL MEDICINE

## 2018-06-09 PROCEDURE — 80053 COMPREHEN METABOLIC PANEL: CPT

## 2018-06-09 PROCEDURE — 0DB98ZX EXCISION OF DUODENUM, VIA NATURAL OR ARTIFICIAL OPENING ENDOSCOPIC, DIAGNOSTIC: ICD-10-PCS | Performed by: SURGERY

## 2018-06-09 PROCEDURE — 88312 SPECIAL STAINS GROUP 1: CPT

## 2018-06-09 PROCEDURE — 700102 HCHG RX REV CODE 250 W/ 637 OVERRIDE(OP): Performed by: HOSPITALIST

## 2018-06-09 PROCEDURE — 76700 US EXAM ABDOM COMPLETE: CPT

## 2018-06-09 PROCEDURE — 770006 HCHG ROOM/CARE - MED/SURG/GYN SEMI*

## 2018-06-09 PROCEDURE — 700111 HCHG RX REV CODE 636 W/ 250 OVERRIDE (IP): Performed by: HOSPITALIST

## 2018-06-09 PROCEDURE — 82962 GLUCOSE BLOOD TEST: CPT

## 2018-06-09 RX ORDER — POTASSIUM CHLORIDE 14.9 MG/ML
20 INJECTION INTRAVENOUS ONCE
Status: COMPLETED | OUTPATIENT
Start: 2018-06-09 | End: 2018-06-09

## 2018-06-09 RX ORDER — MAGNESIUM SULFATE HEPTAHYDRATE 40 MG/ML
2 INJECTION, SOLUTION INTRAVENOUS ONCE
Status: COMPLETED | OUTPATIENT
Start: 2018-06-09 | End: 2018-06-09

## 2018-06-09 RX ADMIN — INSULIN HUMAN 3 UNITS: 100 INJECTION, SOLUTION PARENTERAL at 06:26

## 2018-06-09 RX ADMIN — FAMOTIDINE 20 MG: 10 INJECTION, SOLUTION INTRAVENOUS at 07:24

## 2018-06-09 RX ADMIN — SODIUM CHLORIDE, POTASSIUM CHLORIDE, SODIUM LACTATE AND CALCIUM CHLORIDE: 600; 310; 30; 20 INJECTION, SOLUTION INTRAVENOUS at 07:30

## 2018-06-09 RX ADMIN — GABAPENTIN 100 MG: 100 CAPSULE ORAL at 20:35

## 2018-06-09 RX ADMIN — METOCLOPRAMIDE 10 MG: 5 INJECTION, SOLUTION INTRAMUSCULAR; INTRAVENOUS at 12:16

## 2018-06-09 RX ADMIN — HYDROMORPHONE HYDROCHLORIDE 0.5 MG: 2 INJECTION INTRAMUSCULAR; INTRAVENOUS; SUBCUTANEOUS at 04:26

## 2018-06-09 RX ADMIN — MAGNESIUM SULFATE IN WATER 2 G: 40 INJECTION, SOLUTION INTRAVENOUS at 07:30

## 2018-06-09 RX ADMIN — METOCLOPRAMIDE 10 MG: 5 INJECTION, SOLUTION INTRAMUSCULAR; INTRAVENOUS at 17:47

## 2018-06-09 RX ADMIN — INSULIN HUMAN 4 UNITS: 100 INJECTION, SOLUTION PARENTERAL at 20:55

## 2018-06-09 RX ADMIN — ATORVASTATIN CALCIUM 20 MG: 20 TABLET, FILM COATED ORAL at 20:34

## 2018-06-09 RX ADMIN — HYDROMORPHONE HYDROCHLORIDE 0.5 MG: 10 INJECTION, SOLUTION INTRAMUSCULAR; INTRAVENOUS; SUBCUTANEOUS at 12:37

## 2018-06-09 RX ADMIN — STANDARDIZED SENNA CONCENTRATE AND DOCUSATE SODIUM 2 TABLET: 8.6; 5 TABLET, FILM COATED ORAL at 20:34

## 2018-06-09 RX ADMIN — SUCRALFATE 1 G: 1 SUSPENSION ORAL at 12:16

## 2018-06-09 RX ADMIN — POTASSIUM CHLORIDE 20 MEQ: 200 INJECTION, SOLUTION INTRAVENOUS at 07:30

## 2018-06-09 RX ADMIN — HALOPERIDOL LACTATE 5 MG: 5 INJECTION, SOLUTION INTRAMUSCULAR at 08:15

## 2018-06-09 RX ADMIN — SUCRALFATE 1 G: 1 SUSPENSION ORAL at 17:47

## 2018-06-09 RX ADMIN — SODIUM CHLORIDE, POTASSIUM CHLORIDE, SODIUM LACTATE AND CALCIUM CHLORIDE: 600; 310; 30; 20 INJECTION, SOLUTION INTRAVENOUS at 17:57

## 2018-06-09 RX ADMIN — SODIUM CHLORIDE, POTASSIUM CHLORIDE, SODIUM LACTATE AND CALCIUM CHLORIDE: 600; 310; 30; 20 INJECTION, SOLUTION INTRAVENOUS at 03:15

## 2018-06-09 RX ADMIN — FAMOTIDINE 20 MG: 10 INJECTION, SOLUTION INTRAVENOUS at 20:36

## 2018-06-09 RX ADMIN — FENTANYL CITRATE 25 MCG: 50 INJECTION, SOLUTION INTRAMUSCULAR; INTRAVENOUS at 09:09

## 2018-06-09 RX ADMIN — FENTANYL CITRATE 25 MCG: 50 INJECTION, SOLUTION INTRAMUSCULAR; INTRAVENOUS at 09:14

## 2018-06-09 RX ADMIN — SUCRALFATE 1 G: 1 SUSPENSION ORAL at 23:45

## 2018-06-09 RX ADMIN — SODIUM CHLORIDE, POTASSIUM CHLORIDE, SODIUM LACTATE AND CALCIUM CHLORIDE: 600; 310; 30; 20 INJECTION, SOLUTION INTRAVENOUS at 12:16

## 2018-06-09 RX ADMIN — HALOPERIDOL LACTATE 5 MG: 5 INJECTION, SOLUTION INTRAMUSCULAR at 15:11

## 2018-06-09 RX ADMIN — INSULIN HUMAN 12 UNITS: 100 INJECTION, SOLUTION PARENTERAL at 12:23

## 2018-06-09 RX ADMIN — HYDROMORPHONE HYDROCHLORIDE 0.5 MG: 10 INJECTION, SOLUTION INTRAMUSCULAR; INTRAVENOUS; SUBCUTANEOUS at 17:47

## 2018-06-09 RX ADMIN — INSULIN HUMAN 10 UNITS: 100 INJECTION, SOLUTION PARENTERAL at 17:54

## 2018-06-09 RX ADMIN — HALOPERIDOL LACTATE 5 MG: 5 INJECTION, SOLUTION INTRAMUSCULAR at 20:44

## 2018-06-09 RX ADMIN — METOCLOPRAMIDE 10 MG: 5 INJECTION, SOLUTION INTRAMUSCULAR; INTRAVENOUS at 06:24

## 2018-06-09 ASSESSMENT — ENCOUNTER SYMPTOMS
DIARRHEA: 0
VOMITING: 1
BLOOD IN STOOL: 0
ABDOMINAL PAIN: 1
NAUSEA: 1
BACK PAIN: 0
LOSS OF CONSCIOUSNESS: 0
SHORTNESS OF BREATH: 0
FEVER: 0
DIZZINESS: 0
CONSTIPATION: 0
COUGH: 0
HEADACHES: 0
CHILLS: 0

## 2018-06-09 ASSESSMENT — PAIN SCALES - GENERAL
PAINLEVEL_OUTOF10: 8
PAINLEVEL_OUTOF10: 0
PAINLEVEL_OUTOF10: 3
PAINLEVEL_OUTOF10: 5
PAINLEVEL_OUTOF10: 5
PAINLEVEL_OUTOF10: 7
PAINLEVEL_OUTOF10: 8

## 2018-06-09 NOTE — CARE PLAN
Problem: Knowledge Deficit  Goal: Knowledge of disease process/condition, treatment plan, diagnostic tests, and medications will improve    Intervention: Assess knowledge level of disease process/condition, treatment plan, diagnostic tests, and medications  Pt going to surgery today with Dr. Black, all questions answered, consents signed, pre-op checklist complete.       Problem: Pain Management  Goal: Pain level will decrease to patient's comfort goal    Intervention: Follow pain managment plan developed in collaboration with patient and Interdisciplinary Team  Pt complaining of 3/10 pain, medicated as per MD orders (see MAR). Provided relaxation techniques, rest, and repositioning the pt.

## 2018-06-09 NOTE — PROGRESS NOTES
GI Attending    Pt with intractable nausea and vomiting in the setting of diabetes mellitus.  Plan for EGD with possible dilation this AM with anesthesiologist assistance.  RBA held with patient.

## 2018-06-09 NOTE — OR NURSING
0854: received to PACU via bed. Respirations spontaneous and unlabored. Abdomen softly distended. Pain scale 8/10.  0909: medicated for pain. See MAR  0914: still c/o pain. Medicated. See MAR.  0931: report called to Rosa CHAVIRA.  0938:transported via bed to  428-1 with O2 at 3L/nc by transport. Stable.

## 2018-06-09 NOTE — OR SURGEON
Immediate Post OP Note    PreOp Diagnosis: nausea and vomiting     PostOp Diagnosis: esophagitis, gastritis, pyloric stenosis, nausea and vomiting     Procedure(s):  GASTROSCOPY-ENDO    Surgeon(s):  Marino Black M.D.    Anesthesiologist/Type of Anesthesia:  Anesthesiologist: Yahaira Arreaga M.D./* No anesthesia type entered *    Surgical Staff:  Circulator: Dennis Guthrie R.N.  Endoscopy Technician: Alton Baum    Specimens removed if any:  * No specimens in log *    Estimated Blood Loss: < 5 cc    Findings: [place] Class Grade C ulcerative esophagitis from 30 cm to 37 cm distal to incisors, non bleeding.  Gastritis suspected with diffuse edema, erythema, few erosions in antrum.  BIopsied.   Duodenal biopsies obtained to rule out duodenitis/Celiac Sprue in this diabetic patient with GI symptoms.  Pyloric channel somewhat tight, requiring moderate pressure to pass the endoscope.  The pylorus was dilated up to 20 mm using balloon.      Complications: no immediate    IMPRESSION(S):  1) Esophagitis  2) Gastritis, suspected  3) Pyloric stenosis, status post balloon dilation    Plan/Recommendations:  1) Ultrasound abdomen today to evaluate biliary tree  2) Consider HIDA with CCK if ultrasound unremarkable.  3) CLD, no reds, after ultrasound  4) Anti emetics, haldol, reglan as we are   5) Await path results              6/9/2018 8:30 AM Marino Black M.D.

## 2018-06-09 NOTE — PROCEDURES
DATE OF SERVICE:  06/09/2018    PROCEDURE PERFORMED:  Esophagogastroduodenoscopy with biopsy and balloon   dilation.    INSTRUMENT UTILIZED:  Olympus flexible forward viewing gastroscope.    INDICATIONS:  Diabetic patient with intractable nausea and vomiting.    CONSENT:  RBA discussion held prior to the procedure and a signed and   witnessed consent form placed on the chart.  Risks including, but not limited   to bleeding, complications of sedation and perforation were discussed.    SEDATION:  Provided by Dr. Arreaga of anesthesiology.    TOLERANCE:  Excellent.    PATHOLOGY SPECIMENS:  A.  Duodenal biopsies.  B.  Gastric biopsies.    PROCEDURAL DETAILS:  After adequate sedation, the Olympus flexible forward   viewing gastroscope was introduced per the oral route into the esophagus.    There was immediately noted to be [place] classification grade C   bandar-circumferential esophagitis extending from about 30 cm distal to the   incisors down to about 37 cm from the incisors, at the level of the   gastroesophageal junction.  The instrument was passed through the patent   gastroesophageal junction into the stomach.  Air was insufflated and the   gastric mucosa inspected including a retroflexed view of the gastric cardia.    There was diffuse erythema and edema with a few erosions in the antrum.    Retroflexion was unremarkable.  Otherwise, the instrument was passed through   the pyloric channel.  It did require moderate pressure on the endoscope to   pass through the pyloric channel.  The estimated luminal diameter was about 13   mm.  The first, second, and third portions of the duodenum were unremarkable.    Random duodenal biopsies were obtained to evaluate for duodenitis or celiac   sprue in this patient with diabetes mellitus and therefore increased risk for   celiac sprue.  Next, I pulled the endoscope back to the stomach and took   random biopsies from the antrum, body, and fundus of the stomach to evaluate    for gastritis and Helicobacter pylori infection.  Next, utilizing sequential   18 mm, 19 mm and 20 mm balloon, I sequentially dilated the pyloric channel up   to a maximum of 20 mm and held for 1 minute at each interval.    No immediate complications.    IMPRESSIONS AND FINDINGS:  1.  [place] classification grade C ulcerative esophagitis -- likely the   result the patient's nausea and vomiting.  2.  Gastritis, suspected.  3.  Pyloric stenosis, possible, status post dilation.    PLAN AND RECOMMENDATIONS:  1.  Observe for any adverse events from today's procedure.  2.  Await pathology results.  3.  Ultrasound of the abdomen today to evaluate for hepatobiliary   abnormalities as a contributor to nausea and vomiting.  4.  Consider HIDA scan with CCK if ultrasound is normal.  5.  Clear liquid diet, no reds, after ultrasound.       ____________________________________     MD XIMENA DANIELS / UDAY    DD:  06/09/2018 08:59:58  DT:  06/09/2018 09:14:24    D#:  [id number]  Job#:  [id number]    cc: Navi Huber MD, PRICILLA ALVARADO MD, Yahaira Arreaga MD

## 2018-06-09 NOTE — CARE PLAN
Problem: Safety  Goal: Will remain free from falls  Outcome: PROGRESSING AS EXPECTED  Bed in low position with bed alarm on. Call light within reach. Lower bed rails in place. Treaded socks in use. Pt near nurses station.       Problem: Skin Integrity  Goal: Risk for impaired skin integrity will decrease  Outcome: PROGRESSING AS EXPECTED  Skin assessment complete. Appropriate barriers in place. Patient turns self.  Intervention: Implement precautions to protect skin integrity in collaboration with the interdisciplinary team   06/08/18 2000   OTHER   Skin Preventative Measures Pillows in Use to Float Heels;Pillows in Use for Support / Positioning;Silicone Oxygen Tubing in Use   Bed Types Pressure Redistribution Mattress (Atmosair)   Friction Interventions Draw Sheet / Pad Used for Repositioning   Moisturizers Moisturizer ;Barrier Wipes   Patient Turns / Repositioning Patient Turns Self from Side to Side   Patient is Receiving Nutrition (oral intake inadequate r/t n/v)   Nutrition Consult Ordered No, Consult has Already been Placed   Vitamin Therapy in Use No

## 2018-06-09 NOTE — PROGRESS NOTES
Late Entry:    0800: Pt taken to pre-op with ACLS RN and transport. Pre-op checklist complete, consents signed and on chart.

## 2018-06-09 NOTE — PROGRESS NOTES
1200 Received report, introduced self to pt, reviewed labs, orders, allergies, code status. Pt arrived to unit. Drowsy, skin is intact, pt has steady gait, ambulated to bathroom , SBA, dressing on central line is CDI, changed today before transfer. No complaints at this time.    1500 pt reports bowel movement, first one since admittance.    1700 US completed, clear liquid diet resumed    1800 spoke with Dr Andrew in hallway about pt's two consecutive blood sugars above 300. MD stated its ok as pt just received 10units of regular insuline, will receive her lantus tonight and the lantus was held this AM for the ultrasound.

## 2018-06-09 NOTE — PROGRESS NOTES
"Hospital Medicine Daily Progress Note    Date of Service  6/9/2018    Chief Complaint  27 y.o. female admitted 6/2/2018 with PMHx of brittle TIDM, diabetic gastroparesis, frequent admissions for DKA  EGD 6/9: good peristalisis, possible pyloric stenosis which was dialated    Interval Problem Update  conts to have N  Po intake is very low; only a few bights of po  LR 125ml/hr  2.0 LNC  Pt feeling \"a little\" better.  Po intake is still very little.  cont's with intermitent epigastric pain.    Consultants/Specialty  GI  Pulmonology    Disposition  OK to floor    Review of Systems  Review of Systems   Constitutional: Negative for chills and fever.   Respiratory: Negative for cough and shortness of breath.    Cardiovascular: Negative for chest pain.   Gastrointestinal: Positive for abdominal pain, nausea and vomiting. Negative for blood in stool, constipation, diarrhea and melena.   Musculoskeletal: Negative for back pain.   Skin: Negative for rash.   Neurological: Negative for dizziness, loss of consciousness and headaches.        Physical Exam  Blood Pressure: 118/75   Temperature: 36.8 °C (98.2 °F)   Pulse: 95   Respiration: 18   Pulse Oximetry: 98 %     Physical Exam   Constitutional: She is oriented to person, place, and time. She appears well-developed and well-nourished. No distress.   HENT:   Head: Normocephalic and atraumatic.   Eyes: Conjunctivae are normal.   Neck: Neck supple. No JVD present.   Cardiovascular: Normal rate and regular rhythm.    Pulmonary/Chest: Effort normal. No stridor. No respiratory distress. She has no wheezes. She has no rales.   Abdominal: Soft. There is no tenderness. There is no rebound and no guarding.   Musculoskeletal: She exhibits no edema.   Neurological: She is oriented to person, place, and time.   Skin: Skin is warm and dry. No rash noted. She is not diaphoretic. There is pallor.   Psychiatric: She has a normal mood and affect. Thought content normal.   Nursing note and vitals " reviewed.      Fluids    Intake/Output Summary (Last 24 hours) at 06/09/18 0608  Last data filed at 06/09/18 0400   Gross per 24 hour   Intake             4587 ml   Output             3030 ml   Net             1557 ml       Laboratory  Recent Labs      06/07/18   0324  06/08/18   0307  06/09/18   0420   WBC  5.0  5.9  4.6*   RBC  4.01*  4.02*  3.80*   HEMOGLOBIN  11.7*  11.7*  11.1*   HEMATOCRIT  34.6*  35.2*  33.3*   MCV  86.3  87.6  87.6   MCH  29.2  29.1  29.2   MCHC  33.8  33.2*  33.3*   RDW  39.6  40.3  40.5   PLATELETCT  167  181  177   MPV  9.8  9.9  9.7     Recent Labs      06/07/18   0324 06/08/18   0307  06/09/18   0420   SODIUM  136  139  143   POTASSIUM  3.6  3.4*  3.6   CHLORIDE  101  103  106   CO2  26  28  31   GLUCOSE  278*  243*  132*   BUN  7*  8  8   CREATININE  0.36*  0.41*  0.48*   CALCIUM  7.8*  8.1*  8.1*                   Imaging  ECHOCARDIOGRAM COMP W/O CONT   Final Result      DX-CHEST-LIMITED (1 VIEW)   Final Result      1.  No evidence of pneumothorax following RIGHT neck catheter placement      CT-ABDOMEN-PELVIS WITH   Final Result         1.  Thickening of the distal esophageal wall, new since prior study, consider component of esophagitis. Could be followed up with endoscopy for further evaluation as clinically appropriate.   2.  Hepatomegaly      DX-CHEST-PORTABLE (1 VIEW)   Final Result      No acute cardiac or pulmonary abnormalities are identified.           Assessment/Plan  * Gastroparesis due to DM (HCC)- (present on admission)   Assessment & Plan    GI consulted  Scheduled reglan  Gabapentin 100mg bid  Prn zofran, compazine  EGD showling pyloric stenosis which was dialated  Check US to further asses GB as possible etiology of N/V/abd pain  Monitor on telemetry with above multiple agents that could prolong her QT        Type 1 diabetes mellitus (HCC)- (present on admission)   Assessment & Plan    Uncontrolled, HbA1c 12.5  Insulin and sliding scale  Encourage compliance with  insulin        Hypophosphatemia- (present on admission)   Assessment & Plan    Replete        Hypocalcemia- (present on admission)   Assessment & Plan    Repleted        Diabetic ketoacidosis (HCC)- (present on admission)   Assessment & Plan    Acidosis resolved  Lantus and sliding scale  Follow CO2 and AG closely            Quality-Core Measures

## 2018-06-10 ENCOUNTER — APPOINTMENT (OUTPATIENT)
Dept: RADIOLOGY | Facility: MEDICAL CENTER | Age: 28
DRG: 638 | End: 2018-06-10
Attending: INTERNAL MEDICINE
Payer: MEDICAID

## 2018-06-10 LAB
ALBUMIN SERPL BCP-MCNC: 2.7 G/DL (ref 3.2–4.9)
ALBUMIN/GLOB SERPL: 1.4 G/DL
ALP SERPL-CCNC: 79 U/L (ref 30–99)
ALT SERPL-CCNC: 13 U/L (ref 2–50)
ANION GAP SERPL CALC-SCNC: 8 MMOL/L (ref 0–11.9)
AST SERPL-CCNC: 9 U/L (ref 12–45)
BASOPHILS # BLD AUTO: 0.5 % (ref 0–1.8)
BASOPHILS # BLD: 0.03 K/UL (ref 0–0.12)
BILIRUB SERPL-MCNC: 0.6 MG/DL (ref 0.1–1.5)
BUN SERPL-MCNC: 6 MG/DL (ref 8–22)
CALCIUM SERPL-MCNC: 8.1 MG/DL (ref 8.5–10.5)
CHLORIDE SERPL-SCNC: 102 MMOL/L (ref 96–112)
CO2 SERPL-SCNC: 29 MMOL/L (ref 20–33)
CORTIS SERPL-MCNC: 0.7 UG/DL (ref 0–23)
CREAT SERPL-MCNC: 0.32 MG/DL (ref 0.5–1.4)
EOSINOPHIL # BLD AUTO: 0.19 K/UL (ref 0–0.51)
EOSINOPHIL NFR BLD: 3.2 % (ref 0–6.9)
ERYTHROCYTE [DISTWIDTH] IN BLOOD BY AUTOMATED COUNT: 39.4 FL (ref 35.9–50)
GLOBULIN SER CALC-MCNC: 2 G/DL (ref 1.9–3.5)
GLUCOSE BLD-MCNC: 108 MG/DL (ref 65–99)
GLUCOSE BLD-MCNC: 184 MG/DL (ref 65–99)
GLUCOSE BLD-MCNC: 273 MG/DL (ref 65–99)
GLUCOSE BLD-MCNC: 277 MG/DL (ref 65–99)
GLUCOSE SERPL-MCNC: 78 MG/DL (ref 65–99)
HCT VFR BLD AUTO: 33 % (ref 37–47)
HGB BLD-MCNC: 11.2 G/DL (ref 12–16)
IMM GRANULOCYTES # BLD AUTO: 0.03 K/UL (ref 0–0.11)
IMM GRANULOCYTES NFR BLD AUTO: 0.5 % (ref 0–0.9)
LIPASE SERPL-CCNC: 7 U/L (ref 11–82)
LYMPHOCYTES # BLD AUTO: 2.74 K/UL (ref 1–4.8)
LYMPHOCYTES NFR BLD: 46.8 % (ref 22–41)
MAGNESIUM SERPL-MCNC: 1.5 MG/DL (ref 1.5–2.5)
MCH RBC QN AUTO: 29.2 PG (ref 27–33)
MCHC RBC AUTO-ENTMCNC: 33.9 G/DL (ref 33.6–35)
MCV RBC AUTO: 86.2 FL (ref 81.4–97.8)
MONOCYTES # BLD AUTO: 0.69 K/UL (ref 0–0.85)
MONOCYTES NFR BLD AUTO: 11.8 % (ref 0–13.4)
NEUTROPHILS # BLD AUTO: 2.17 K/UL (ref 2–7.15)
NEUTROPHILS NFR BLD: 37.2 % (ref 44–72)
NRBC # BLD AUTO: 0 K/UL
NRBC BLD-RTO: 0 /100 WBC
PLATELET # BLD AUTO: 212 K/UL (ref 164–446)
PMV BLD AUTO: 10 FL (ref 9–12.9)
POTASSIUM SERPL-SCNC: 3.1 MMOL/L (ref 3.6–5.5)
PROT SERPL-MCNC: 4.7 G/DL (ref 6–8.2)
RBC # BLD AUTO: 3.83 M/UL (ref 4.2–5.4)
SODIUM SERPL-SCNC: 139 MMOL/L (ref 135–145)
TSH SERPL DL<=0.005 MIU/L-ACNC: 2.76 UIU/ML (ref 0.38–5.33)
WBC # BLD AUTO: 5.9 K/UL (ref 4.8–10.8)

## 2018-06-10 PROCEDURE — A9270 NON-COVERED ITEM OR SERVICE: HCPCS | Performed by: INTERNAL MEDICINE

## 2018-06-10 PROCEDURE — 82962 GLUCOSE BLOOD TEST: CPT

## 2018-06-10 PROCEDURE — 700105 HCHG RX REV CODE 258: Performed by: HOSPITALIST

## 2018-06-10 PROCEDURE — 700105 HCHG RX REV CODE 258

## 2018-06-10 PROCEDURE — 36415 COLL VENOUS BLD VENIPUNCTURE: CPT

## 2018-06-10 PROCEDURE — A9270 NON-COVERED ITEM OR SERVICE: HCPCS | Performed by: HOSPITALIST

## 2018-06-10 PROCEDURE — 80053 COMPREHEN METABOLIC PANEL: CPT

## 2018-06-10 PROCEDURE — 700111 HCHG RX REV CODE 636 W/ 250 OVERRIDE (IP): Performed by: INTERNAL MEDICINE

## 2018-06-10 PROCEDURE — 700102 HCHG RX REV CODE 250 W/ 637 OVERRIDE(OP): Performed by: HOSPITALIST

## 2018-06-10 PROCEDURE — 700111 HCHG RX REV CODE 636 W/ 250 OVERRIDE (IP): Performed by: HOSPITALIST

## 2018-06-10 PROCEDURE — 770006 HCHG ROOM/CARE - MED/SURG/GYN SEMI*

## 2018-06-10 PROCEDURE — 83690 ASSAY OF LIPASE: CPT

## 2018-06-10 PROCEDURE — 83735 ASSAY OF MAGNESIUM: CPT

## 2018-06-10 PROCEDURE — 78227 HEPATOBIL SYST IMAGE W/DRUG: CPT

## 2018-06-10 PROCEDURE — 84443 ASSAY THYROID STIM HORMONE: CPT

## 2018-06-10 PROCEDURE — 85025 COMPLETE CBC W/AUTO DIFF WBC: CPT

## 2018-06-10 PROCEDURE — 700102 HCHG RX REV CODE 250 W/ 637 OVERRIDE(OP): Performed by: INTERNAL MEDICINE

## 2018-06-10 PROCEDURE — 82533 TOTAL CORTISOL: CPT

## 2018-06-10 PROCEDURE — 99232 SBSQ HOSP IP/OBS MODERATE 35: CPT | Performed by: HOSPITALIST

## 2018-06-10 RX ORDER — SODIUM CHLORIDE 9 MG/ML
INJECTION, SOLUTION INTRAVENOUS
Status: COMPLETED
Start: 2018-06-10 | End: 2018-06-10

## 2018-06-10 RX ORDER — POTASSIUM CHLORIDE 20 MEQ/1
40 TABLET, EXTENDED RELEASE ORAL DAILY
Status: DISCONTINUED | OUTPATIENT
Start: 2018-06-10 | End: 2018-06-11 | Stop reason: HOSPADM

## 2018-06-10 RX ADMIN — INSULIN GLARGINE 40 UNITS: 100 INJECTION, SOLUTION SUBCUTANEOUS at 21:21

## 2018-06-10 RX ADMIN — SODIUM CHLORIDE, POTASSIUM CHLORIDE, SODIUM LACTATE AND CALCIUM CHLORIDE: 600; 310; 30; 20 INJECTION, SOLUTION INTRAVENOUS at 02:07

## 2018-06-10 RX ADMIN — SUCRALFATE 1 G: 1 SUSPENSION ORAL at 17:28

## 2018-06-10 RX ADMIN — SCOPALAMINE 1 PATCH: 1 PATCH, EXTENDED RELEASE TRANSDERMAL at 11:23

## 2018-06-10 RX ADMIN — POTASSIUM CHLORIDE 40 MEQ: 1500 TABLET, EXTENDED RELEASE ORAL at 17:28

## 2018-06-10 RX ADMIN — INSULIN HUMAN 3 UNITS: 100 INJECTION, SOLUTION PARENTERAL at 21:20

## 2018-06-10 RX ADMIN — INSULIN HUMAN 7 UNITS: 100 INJECTION, SOLUTION PARENTERAL at 17:38

## 2018-06-10 RX ADMIN — HALOPERIDOL LACTATE 5 MG: 5 INJECTION, SOLUTION INTRAMUSCULAR at 22:56

## 2018-06-10 RX ADMIN — INSULIN HUMAN 7 UNITS: 100 INJECTION, SOLUTION PARENTERAL at 11:26

## 2018-06-10 RX ADMIN — SODIUM CHLORIDE, POTASSIUM CHLORIDE, SODIUM LACTATE AND CALCIUM CHLORIDE: 600; 310; 30; 20 INJECTION, SOLUTION INTRAVENOUS at 21:11

## 2018-06-10 RX ADMIN — SODIUM CHLORIDE, POTASSIUM CHLORIDE, SODIUM LACTATE AND CALCIUM CHLORIDE: 600; 310; 30; 20 INJECTION, SOLUTION INTRAVENOUS at 10:32

## 2018-06-10 RX ADMIN — GABAPENTIN 100 MG: 100 CAPSULE ORAL at 21:04

## 2018-06-10 RX ADMIN — HYDROMORPHONE HYDROCHLORIDE 0.25 MG: 10 INJECTION, SOLUTION INTRAMUSCULAR; INTRAVENOUS; SUBCUTANEOUS at 02:54

## 2018-06-10 RX ADMIN — METOCLOPRAMIDE 10 MG: 5 INJECTION, SOLUTION INTRAMUSCULAR; INTRAVENOUS at 06:27

## 2018-06-10 RX ADMIN — SUCRALFATE 1 G: 1 SUSPENSION ORAL at 22:56

## 2018-06-10 RX ADMIN — METOCLOPRAMIDE 10 MG: 5 INJECTION, SOLUTION INTRAMUSCULAR; INTRAVENOUS at 17:29

## 2018-06-10 RX ADMIN — SODIUM CHLORIDE: 9 INJECTION, SOLUTION INTRAVENOUS at 17:45

## 2018-06-10 RX ADMIN — HYDROMORPHONE HYDROCHLORIDE 0.25 MG: 10 INJECTION, SOLUTION INTRAMUSCULAR; INTRAVENOUS; SUBCUTANEOUS at 14:21

## 2018-06-10 RX ADMIN — HYDROMORPHONE HYDROCHLORIDE 0.5 MG: 10 INJECTION, SOLUTION INTRAMUSCULAR; INTRAVENOUS; SUBCUTANEOUS at 21:04

## 2018-06-10 RX ADMIN — FAMOTIDINE 20 MG: 10 INJECTION, SOLUTION INTRAVENOUS at 22:56

## 2018-06-10 RX ADMIN — HALOPERIDOL LACTATE 5 MG: 5 INJECTION, SOLUTION INTRAMUSCULAR at 14:21

## 2018-06-10 RX ADMIN — ATORVASTATIN CALCIUM 20 MG: 20 TABLET, FILM COATED ORAL at 21:05

## 2018-06-10 RX ADMIN — HYDROMORPHONE HYDROCHLORIDE 0.25 MG: 10 INJECTION, SOLUTION INTRAMUSCULAR; INTRAVENOUS; SUBCUTANEOUS at 17:46

## 2018-06-10 ASSESSMENT — ENCOUNTER SYMPTOMS
STRIDOR: 0
ABDOMINAL PAIN: 0
NECK PAIN: 0
NAUSEA: 1
DIARRHEA: 0
WHEEZING: 0
CHILLS: 0
FLANK PAIN: 0
WEAKNESS: 0
PALPITATIONS: 0
VOMITING: 0
SHORTNESS OF BREATH: 0
EYE DISCHARGE: 0
FOCAL WEAKNESS: 0
FEVER: 0
BACK PAIN: 0
COUGH: 0
SPEECH CHANGE: 0
SENSORY CHANGE: 0

## 2018-06-10 ASSESSMENT — PAIN SCALES - GENERAL
PAINLEVEL_OUTOF10: 4
PAINLEVEL_OUTOF10: 8
PAINLEVEL_OUTOF10: 3
PAINLEVEL_OUTOF10: 4
PAINLEVEL_OUTOF10: 6
PAINLEVEL_OUTOF10: 6
PAINLEVEL_OUTOF10: 7
PAINLEVEL_OUTOF10: 7

## 2018-06-10 NOTE — PROGRESS NOTES
[facility] Hospitalist Progress Note    Date of Service: 6/10/2018    Chief Complaint  27 y.o. Female hx of brittle IDDM reported gastroparesis  admitted 2018 with N/V/ abd pain. Dx DKA    Interval Problem Update    Nausea signif better- HIDA unremarkable.  Post EGD yesterday with findings of gastric, duodenal ,  inflammatory changes and pyloric stenosis that was dilated. K low.    Consultants/Specialty  GI      Disposition  Anticipate dc home when stable.        Review of Systems   Constitutional: Negative for chills, fever and malaise/fatigue.   HENT: Negative for congestion.    Eyes: Negative for discharge.   Respiratory: Negative for cough, shortness of breath, wheezing and stridor.    Cardiovascular: Negative for chest pain, palpitations and leg swelling.   Gastrointestinal: Positive for nausea. Negative for abdominal pain, diarrhea and vomiting.   Genitourinary: Negative for flank pain and hematuria.   Musculoskeletal: Negative for back pain, joint pain and neck pain.   Neurological: Negative for sensory change, speech change, focal weakness and weakness.      Physical Exam  Laboratory/Imaging   Hemodynamics  Temp (24hrs), Av.5 °C (97.7 °F), Min:36.3 °C (97.3 °F), Max:36.6 °C (97.9 °F)   Temperature: 36.6 °C (97.8 °F)  Pulse  Av.3  Min: 75  Max: 160    Blood Pressure: 129/88      Respiratory      Respiration: 18, Pulse Oximetry: 94 %        RUL Breath Sounds: Clear, RML Breath Sounds: Clear, RLL Breath Sounds: Diminished, NANCY Breath Sounds: Clear, LLL Breath Sounds: Diminished    Fluids    Intake/Output Summary (Last 24 hours) at 06/10/18 1513  Last data filed at 06/10/18 1400   Gross per 24 hour   Intake             2940 ml   Output              900 ml   Net             2040 ml       Nutrition  Orders Placed This Encounter   Procedures   • DIET ORDER     Standing Status:   Standing     Number of Occurrences:   1     Order Specific Question:   Diet:     Answer:   Clear Liquids - No Red Foods [12]      Comments:   To begin after her ultrasound today.     Physical Exam   Constitutional: She is oriented to person, place, and time. No distress.   HENT:   Head: Normocephalic and atraumatic.   Right Ear: External ear normal.   Left Ear: External ear normal.   Nose: Nose normal.   Eyes: EOM are normal. Right eye exhibits no discharge. Left eye exhibits no discharge. No scleral icterus.   Neck: Normal range of motion.   Cardiovascular: Normal rate and regular rhythm.    No murmur heard.  Pulmonary/Chest: Effort normal. No stridor. She has no wheezes. She has no rales.   Abdominal: Soft. Bowel sounds are normal. She exhibits no distension. There is no tenderness.   Obese.    Musculoskeletal: She exhibits no edema or tenderness.   Neurological: She is alert and oriented to person, place, and time. No cranial nerve deficit.   Skin: Skin is warm and dry. She is not diaphoretic. No pallor.   Psychiatric: She has a normal mood and affect. Her behavior is normal.   Vitals reviewed.      Recent Labs      06/08/18   0307 06/09/18   0420  06/10/18   0445   WBC  5.9  4.6*  5.9   RBC  4.02*  3.80*  3.83*   HEMOGLOBIN  11.7*  11.1*  11.2*   HEMATOCRIT  35.2*  33.3*  33.0*   MCV  87.6  87.6  86.2   MCH  29.1  29.2  29.2   MCHC  33.2*  33.3*  33.9   RDW  40.3  40.5  39.4   PLATELETCT  181  177  212   MPV  9.9  9.7  10.0     Recent Labs      06/08/18   0307  06/09/18   0420  06/10/18   0445   SODIUM  139  143  139   POTASSIUM  3.4*  3.6  3.1*   CHLORIDE  103  106  102   CO2  28  31  29   GLUCOSE  243*  132*  78   BUN  8  8  6*   CREATININE  0.41*  0.48*  0.32*   CALCIUM  8.1*  8.1*  8.1*                      Assessment/Plan     * Gastroparesis due to DM (HCC)- (present on admission)   Assessment & Plan    Improved .  HIDA, US abd - no clear cause for N/V  cw reglan, Trial of sched haldol   Prn zofran, compazine  EGD showling pyloric stenosis which was dilated- monitor response.  Resume clears- adv as luis armando  GI input.         Type 1  diabetes mellitus (HCC)- (present on admission)   Assessment & Plan    Uncontrolled, HbA1c 12.5  sched lantus w SSI   Ada diet  Encourage Compliance         Hypokalemia- (present on admission)   Assessment & Plan    Oral kcl   Fu bmp        Hypophosphatemia- (present on admission)   Assessment & Plan    Replete        Hypocalcemia- (present on admission)   Assessment & Plan    Corrected.         Diabetic ketoacidosis (HCC)- (present on admission)   Assessment & Plan    Resolved  cw insulin           dw mother plan of care.  Quality-Core Measures   Reviewed items::  Labs reviewed, Medications reviewed and Radiology images reviewed  Pitt catheter::  No Pitt  DVT prophylaxis - mechanical:  SCDs

## 2018-06-10 NOTE — PROGRESS NOTES
O x 4, resting in bed.  Medicated for pain at 0254, pain level 8/10 in abdomen.  HIDDA scan in am, patient understands that pain meds must be held for 6hrs prior to this procedure.  NPO since midnight.  Central line in place, dressing intact, all lumens patent.  Patient refused nighttime lantus, r/t NPO status.  Plan of care discussed, questions answered verbalized understanding.

## 2018-06-10 NOTE — PROGRESS NOTES
Gastroenterology Progress Note     Author: Marino Black MD  Date & Time Created: 6/10/2018 9:22 AM    Chief Complaint:  GI Attending    Interval History:  Cc: nausea, vomiting, abd pains  S: Patient examined and interviewed, course reviewed.   Course reviewed, and case discussed Hospitalist.  Pt had EGD yesterday showing LA Class Grade C ulcerative esophagitis, possible mild gastritis, mild pyloric stenosis which I dilated.  There was visible gastric motility during the procedure.  She continues to have upper abdominal discomfort, 6/10, non radiating, worse if eats.  Some nausea but no vomiting since yesterday.     Review of Systems:  ROS    Physical Exam:  Physical Exam    Labs:        Invalid input(s): [id number]      Recent Labs      18   0420  06/10/18   0445   SODIUM  139  143  139   POTASSIUM  3.4*  3.6  3.1*   CHLORIDE  103  106  102   CO2  28  31  29   BUN  8  8  6*   CREATININE  0.41*  0.48*  0.32*   MAGNESIUM  1.9  1.8  1.5   CALCIUM  8.1*  8.1*  8.1*     Recent Labs      180  06/10/18   0248  06/10/18   0445   ALTSGPT  19  17   --   13   ASTSGOT  17  15   --   9*   ALKPHOSPHAT  82  86   --   79   TBILIRUBIN  0.4  0.5   --   0.6   LIPASE   --    --   7*   --    GLUCOSE  243*  132*   --   78     Recent Labs      18   0420  06/10/18   0445   RBC  4.02*  3.80*  3.83*   HEMOGLOBIN  11.7*  11.1*  11.2*   HEMATOCRIT  35.2*  33.3*  33.0*   PLATELETCT  181  177  212     Recent Labs      18   0420  06/10/18   0445   WBC  5.9  4.6*  5.9   NEUTSPOLYS  50.20  41.20*  37.20*   LYMPHOCYTES  36.20  40.80  46.80*   MONOCYTES  8.90  11.40  11.80   EOSINOPHILS  3.90  5.40  3.20   BASOPHILS  0.50  0.60  0.50   ASTSGOT  17  15  9*   ALTSGPT  19  17  13   ALKPHOSPHAT  82  86  79   TBILIRUBIN  0.4  0.5  0.6     Hemodynamics:  Temp (24hrs), Av.5 °C (97.7 °F), Min:36.3 °C (97.3 °F), Max:36.7 °C (98.1 °F)  Temperature:  36.3 °C (97.3 °F)  Pulse  Av.4  Min: 75  Max: 160Heart Rate (Monitored): 84  Blood Pressure: 125/84, NIBP: 109/66     Respiratory:    Respiration: 18, Pulse Oximetry: 93 %           Fluids:    Intake/Output Summary (Last 24 hours) at 18 1320  Last data filed at 18 1000   Gross per 24 hour   Intake             2850 ml   Output             1375 ml   Net             1475 ml        GI/Nutrition:  Orders Placed This Encounter   Procedures   • DIET ORDER     Standing Status:   Standing     Number of Occurrences:   1     Order Specific Question:   Diet:     Answer:   Clear Liquids - No Red Foods [12]     Comments:   To begin after her ultrasound today.     Medical Decision Making, by Problem:  Active Hospital Problems    Diagnosis   • *Gastroparesis due to DM (HCC) [E11.43, K31.84]   • Type 1 diabetes mellitus (HCC) [E10.9]   • Hypocalcemia [E83.51]   • Hypophosphatemia [E83.39]   • Diabetic ketoacidosis (HCC) [E13.10]     IMPRESSION(S):  1. Nausea and vomiting- some improvement  2. Epigastric pain, abdominal  3. DKA  4. IDDM, poorly controlled  5. GB sludge  6. Abnormal CT scan with thickened esophagus, likely erosive esophagitis from vomiting  7. Hyperlipidemia     PLAN:  1. HIDA with CCK to evaluate for biliary dyskinesia as source of severe nausea and vomiting  2.  May need out patient [place] Protocol GES later  3. NPO for now, then CLD after HIDA  4. Continue Reglan and Haldo for nausea  5. Replete K  6. Serial labs  7.  Management of diabetes with goal of tight control of hyperglycemia  8.     Quality-Core Measures

## 2018-06-10 NOTE — CARE PLAN
Problem: Pain Management  Goal: Pain level will decrease to patient's comfort goal  Patient states pain is increasing, but aware she can not have pain medications until after HIDA Scan. Declining other forms of pain control.     Problem: Mobility  Goal: Risk for activity intolerance will decrease  Patient up stand by assistance. Patient fatigued this morning and awaiting HIDA Scan.

## 2018-06-10 NOTE — PROGRESS NOTES
Assumed care of patient. Patient fatigued, but easily awoken. Patient states she is in pain, but understands she can not have narcotics due to her HIDA Scan today. Abdomen is soft and non tender. No other needs at this time. Call light within reach.

## 2018-06-11 VITALS
OXYGEN SATURATION: 94 % | HEIGHT: 67 IN | RESPIRATION RATE: 17 BRPM | HEART RATE: 90 BPM | DIASTOLIC BLOOD PRESSURE: 99 MMHG | BODY MASS INDEX: 31.18 KG/M2 | WEIGHT: 198.63 LBS | TEMPERATURE: 97.6 F | SYSTOLIC BLOOD PRESSURE: 143 MMHG

## 2018-06-11 LAB
ALBUMIN SERPL BCP-MCNC: 2.9 G/DL (ref 3.2–4.9)
ALBUMIN/GLOB SERPL: 1.6 G/DL
ALP SERPL-CCNC: 81 U/L (ref 30–99)
ALT SERPL-CCNC: 17 U/L (ref 2–50)
ANION GAP SERPL CALC-SCNC: 8 MMOL/L (ref 0–11.9)
AST SERPL-CCNC: 15 U/L (ref 12–45)
BILIRUB SERPL-MCNC: 0.5 MG/DL (ref 0.1–1.5)
BUN SERPL-MCNC: 5 MG/DL (ref 8–22)
CALCIUM SERPL-MCNC: 8 MG/DL (ref 8.5–10.5)
CHLORIDE SERPL-SCNC: 103 MMOL/L (ref 96–112)
CO2 SERPL-SCNC: 26 MMOL/L (ref 20–33)
CREAT SERPL-MCNC: 0.43 MG/DL (ref 0.5–1.4)
GLOBULIN SER CALC-MCNC: 1.8 G/DL (ref 1.9–3.5)
GLUCOSE BLD-MCNC: 115 MG/DL (ref 65–99)
GLUCOSE BLD-MCNC: 198 MG/DL (ref 65–99)
GLUCOSE SERPL-MCNC: 167 MG/DL (ref 65–99)
MAGNESIUM SERPL-MCNC: 1.5 MG/DL (ref 1.5–2.5)
POTASSIUM SERPL-SCNC: 3.6 MMOL/L (ref 3.6–5.5)
PROT SERPL-MCNC: 4.7 G/DL (ref 6–8.2)
SODIUM SERPL-SCNC: 137 MMOL/L (ref 135–145)

## 2018-06-11 PROCEDURE — 700111 HCHG RX REV CODE 636 W/ 250 OVERRIDE (IP): Performed by: HOSPITALIST

## 2018-06-11 PROCEDURE — 700102 HCHG RX REV CODE 250 W/ 637 OVERRIDE(OP): Performed by: HOSPITALIST

## 2018-06-11 PROCEDURE — 99239 HOSP IP/OBS DSCHRG MGMT >30: CPT | Performed by: HOSPITALIST

## 2018-06-11 PROCEDURE — 700105 HCHG RX REV CODE 258: Performed by: HOSPITALIST

## 2018-06-11 PROCEDURE — 83735 ASSAY OF MAGNESIUM: CPT

## 2018-06-11 PROCEDURE — 700111 HCHG RX REV CODE 636 W/ 250 OVERRIDE (IP): Performed by: INTERNAL MEDICINE

## 2018-06-11 PROCEDURE — A9270 NON-COVERED ITEM OR SERVICE: HCPCS | Performed by: INTERNAL MEDICINE

## 2018-06-11 PROCEDURE — 97161 PT EVAL LOW COMPLEX 20 MIN: CPT

## 2018-06-11 PROCEDURE — 700102 HCHG RX REV CODE 250 W/ 637 OVERRIDE(OP): Performed by: INTERNAL MEDICINE

## 2018-06-11 PROCEDURE — 82962 GLUCOSE BLOOD TEST: CPT

## 2018-06-11 PROCEDURE — A9270 NON-COVERED ITEM OR SERVICE: HCPCS | Performed by: HOSPITALIST

## 2018-06-11 PROCEDURE — 80053 COMPREHEN METABOLIC PANEL: CPT

## 2018-06-11 PROCEDURE — G8978 MOBILITY CURRENT STATUS: HCPCS | Mod: CH

## 2018-06-11 PROCEDURE — G8980 MOBILITY D/C STATUS: HCPCS | Mod: CH

## 2018-06-11 PROCEDURE — 02PYX3Z REMOVAL OF INFUSION DEVICE FROM GREAT VESSEL, EXTERNAL APPROACH: ICD-10-PCS | Performed by: HOSPITALIST

## 2018-06-11 PROCEDURE — G8979 MOBILITY GOAL STATUS: HCPCS | Mod: CH

## 2018-06-11 RX ORDER — OMEPRAZOLE 20 MG/1
20 CAPSULE, DELAYED RELEASE ORAL DAILY
Qty: 30 CAP | Refills: 1 | Status: SHIPPED | OUTPATIENT
Start: 2018-06-11 | End: 2018-11-09 | Stop reason: CLARIF

## 2018-06-11 RX ORDER — INSULIN GLARGINE 100 [IU]/ML
40 INJECTION, SOLUTION SUBCUTANEOUS EVERY EVENING
Qty: 10 ML | Refills: 2 | Status: SHIPPED | OUTPATIENT
Start: 2018-06-11 | End: 2018-06-13

## 2018-06-11 RX ORDER — SUCRALFATE ORAL 1 G/10ML
1 SUSPENSION ORAL EVERY 6 HOURS
Qty: 500 ML | Refills: 1 | Status: SHIPPED | OUTPATIENT
Start: 2018-06-11 | End: 2018-11-03

## 2018-06-11 RX ORDER — GABAPENTIN 100 MG/1
100 CAPSULE ORAL 2 TIMES DAILY
Qty: 60 CAP | Refills: 1 | Status: SHIPPED | OUTPATIENT
Start: 2018-06-11 | End: 2018-12-18 | Stop reason: CLARIF

## 2018-06-11 RX ORDER — DEXTROSE MONOHYDRATE 25 G/50ML
25 INJECTION, SOLUTION INTRAVENOUS
Status: DISCONTINUED | OUTPATIENT
Start: 2018-06-11 | End: 2018-06-11 | Stop reason: HOSPADM

## 2018-06-11 RX ADMIN — SUCRALFATE 1 G: 1 SUSPENSION ORAL at 06:13

## 2018-06-11 RX ADMIN — SUCRALFATE 1 G: 1 SUSPENSION ORAL at 11:34

## 2018-06-11 RX ADMIN — GABAPENTIN 100 MG: 100 CAPSULE ORAL at 07:52

## 2018-06-11 RX ADMIN — VITAMIN D, TAB 1000IU (100/BT) 2000 UNITS: 25 TAB at 07:52

## 2018-06-11 RX ADMIN — HALOPERIDOL LACTATE 5 MG: 5 INJECTION, SOLUTION INTRAMUSCULAR at 07:52

## 2018-06-11 RX ADMIN — SODIUM CHLORIDE, POTASSIUM CHLORIDE, SODIUM LACTATE AND CALCIUM CHLORIDE: 600; 310; 30; 20 INJECTION, SOLUTION INTRAVENOUS at 04:36

## 2018-06-11 RX ADMIN — PROCHLORPERAZINE EDISYLATE 10 MG: 5 INJECTION INTRAMUSCULAR; INTRAVENOUS at 07:52

## 2018-06-11 RX ADMIN — POTASSIUM CHLORIDE 40 MEQ: 1500 TABLET, EXTENDED RELEASE ORAL at 07:52

## 2018-06-11 RX ADMIN — INSULIN HUMAN 3 UNITS: 100 INJECTION, SOLUTION PARENTERAL at 11:38

## 2018-06-11 RX ADMIN — ENOXAPARIN SODIUM 40 MG: 100 INJECTION SUBCUTANEOUS at 07:52

## 2018-06-11 RX ADMIN — HYDROMORPHONE HYDROCHLORIDE 0.5 MG: 10 INJECTION, SOLUTION INTRAMUSCULAR; INTRAVENOUS; SUBCUTANEOUS at 11:42

## 2018-06-11 RX ADMIN — FAMOTIDINE 20 MG: 10 INJECTION, SOLUTION INTRAVENOUS at 07:52

## 2018-06-11 RX ADMIN — HYDROMORPHONE HYDROCHLORIDE 0.5 MG: 10 INJECTION, SOLUTION INTRAMUSCULAR; INTRAVENOUS; SUBCUTANEOUS at 04:36

## 2018-06-11 RX ADMIN — METOCLOPRAMIDE 10 MG: 5 INJECTION, SOLUTION INTRAMUSCULAR; INTRAVENOUS at 11:34

## 2018-06-11 RX ADMIN — HALOPERIDOL LACTATE 5 MG: 5 INJECTION, SOLUTION INTRAMUSCULAR at 14:29

## 2018-06-11 RX ADMIN — METOCLOPRAMIDE 10 MG: 5 INJECTION, SOLUTION INTRAMUSCULAR; INTRAVENOUS at 06:13

## 2018-06-11 ASSESSMENT — ENCOUNTER SYMPTOMS
COUGH: 0
VOMITING: 0
CARDIOVASCULAR NEGATIVE: 1
DIARRHEA: 0
SHORTNESS OF BREATH: 0
FOCAL WEAKNESS: 0
BLOOD IN STOOL: 0
PSYCHIATRIC NEGATIVE: 1
CONSTITUTIONAL NEGATIVE: 1
DEPRESSION: 0
CONSTIPATION: 0
NAUSEA: 1
PALPITATIONS: 0
ABDOMINAL PAIN: 1
WHEEZING: 0
RESPIRATORY NEGATIVE: 1
SEIZURES: 0
CHILLS: 0
MUSCULOSKELETAL NEGATIVE: 1
FEVER: 0
LOSS OF CONSCIOUSNESS: 0
MYALGIAS: 0

## 2018-06-11 ASSESSMENT — PAIN SCALES - GENERAL
PAINLEVEL_OUTOF10: 4
PAINLEVEL_OUTOF10: 6
PAINLEVEL_OUTOF10: 7
PAINLEVEL_OUTOF10: 6
PAINLEVEL_OUTOF10: 7

## 2018-06-11 ASSESSMENT — COGNITIVE AND FUNCTIONAL STATUS - GENERAL
MOBILITY SCORE: 24
SUGGESTED CMS G CODE MODIFIER MOBILITY: CH

## 2018-06-11 ASSESSMENT — GAIT ASSESSMENTS
GAIT LEVEL OF ASSIST: SUPERVISED
DISTANCE (FEET): 500

## 2018-06-11 NOTE — THERAPY
"28 y/o female adm for nausea and vomiting; Dx: DKA,. Intact motor and sensory component BLES. Intact coordination. Balance intact during level ground ambulation with no AD, higher level balance intact as well. No further acute PT services required at this time. However, pt may be seen for an additional visit as per attending providers request to address any DC or equipment needs within  30 days of this evaluation.    Physical Therapy Evaluation completed.   Bed Mobility:  Supine to Sit: Independent  Transfers: Sit to Stand: Supervised  Gait: Level Of Assist: Supervised with No Equipment Needed       Plan of Care: Patient with no further skilled PT needs in the acute care setting at this time  Discharge Recommendations: Equipment: No Equipment Needed. Post-acute therapy Currently anticipate no further skilled therapy needs once patient is discharged from the inpatient setting.    See \"Rehab Therapy-Acute\" Patient Summary Report for complete documentation.     "

## 2018-06-11 NOTE — PROGRESS NOTES
Bedside report completed.  A&O x4.    VSS.  SpO2 94% on RA.  Finger stick blood sugar 184.  Ambulating with no assistance, steady gait.    ml/hr, right IJ, dressing CDI no s/s infection   C/o 7/10 mid upper abdominal pain, medicated per MAR.  Denies nausea at this time.  Last BM 6/10/18, refusing stool softeners.  + void.  Call light and personal belongings within reach.  POC discussed and all questions answered.  No additional needs at this time.

## 2018-06-11 NOTE — PROGRESS NOTES
Report received from RN, assumed care at 0700  Pt is A0X4, and responds appropriately   Pt declines any SOB, chest pain, new onset of numbness/ tingiling  Pt rates pain at 8 /10, on a scale of 1-10, pt medicated per MAR  Pt is voiding adequatly and without hesitancy  Pt has + flatus, + bowel sounds,  BM on 6/10/2018  Pt ambulates with a steady gait, up self  Pt is tolerating a full liquid diabetic diet, pt denies any nausea/vomiting  Pt has a right triple lumen IJ, dressing is clean,dry and intact, no s/s of infection noted  Plan of care discussed, all questions answered. Explained importance of calling before getting OOB and pt verbalizes understanding. Explained importance of oral care. Call light is within reach, treaded slipper socks on, bed in lowest/ locked position, hourly rounding in place, all needs met at this time

## 2018-06-11 NOTE — PROGRESS NOTES
[facility] Progress Note    CC: Recurrent nasuea/vomiting, abnormal CT     HPI: 28 yo Ms. Sparks with pmh of DM type 1, diabetic gastroparesis admitted on 6/2 for repeated nausea/vomiting and DKA. GI consulted for recurrent nausea/vomiting and abnormal finding on the CT (esophageal thickening) and US (gall bladder sludge).    S: Interval Problem Daily Status Update    S/p EGD on 6/9: Grade C ulcerative esophagitis, gastritis and pyloric stenosis, s/p dilatation.   U/S of abd: Echogenic lesion in Right lobe of liver which is likely hemangioma. Hepatomegaly, but no evidence of gallstone and bilary tree dilatation.   S/p HIDA scan on 6/10: Normal    She is feeling better. Having intermittent mild nausea, denies any vomiting. Abdominal pain improved. She has 2 BM since she got here. Last one was yesterday.  She wants to advance her diet.  She denied further modifying factors, associated symptoms or timing issues.    Review of Systems   Constitutional: Negative.  Negative for chills and fever.   Respiratory: Negative.  Negative for cough, shortness of breath and wheezing.    Cardiovascular: Negative.  Negative for palpitations and leg swelling.   Gastrointestinal: Positive for abdominal pain and nausea. Negative for blood in stool, constipation, diarrhea and vomiting.   Genitourinary: Negative.  Negative for dysuria.   Musculoskeletal: Negative.  Negative for myalgias.   Neurological: Negative for focal weakness, seizures and loss of consciousness.   Psychiatric/Behavioral: Negative.  Negative for depression.   All other systems reviewed and are negative.        Quality Measures  Quality-Core Measures   Reviewed items::  Labs reviewed and Medications reviewed          Physical Exam       Vitals:    06/10/18 2345 06/11/18 0425 06/11/18 0500 06/11/18 0725   BP: 135/92 146/101 136/98 143/94   Pulse: 92 74  85   Resp: 17 16  14   Temp: 37.1 °C (98.7 °F) 37.4 °C (99.3 °F)  36.1 °C (96.9 °F)   SpO2: 96% 95%  93%    Weight:       Height:         Body mass index is 31.11 kg/m².    Oxygen Therapy:  Pulse Oximetry: 93 %, O2 (LPM): 0, O2 Delivery: None (Room Air)    Physical Exam   Constitutional: She is oriented to person, place, and time and well-developed, well-nourished, and in no distress. No distress.   Obese,  female, not in acute distress.    HENT:   Head: Normocephalic and atraumatic.   Mouth/Throat: No oropharyngeal exudate.   Eyes: EOM are normal. Pupils are equal, round, and reactive to light. No scleral icterus.   Neck: Neck supple. No JVD present. No tracheal deviation present.   Cardiovascular: Normal rate, regular rhythm and normal heart sounds.    No murmur heard.  Pulmonary/Chest: Effort normal and breath sounds normal. No stridor. No respiratory distress. She has no wheezes.   Abdominal: Soft. Bowel sounds are normal. She exhibits no distension. There is tenderness. There is no rebound and no guarding.   Mild tenderness in the epigastrium and left upper quadrant.    Musculoskeletal: Normal range of motion. She exhibits no edema, tenderness or deformity.   Neurological: She is alert and oriented to person, place, and time.   No gross motor or sensory deficit.    Skin: No rash noted. She is not diaphoretic.   Psychiatric: Affect and judgment normal.   Nursing note and vitals reviewed.        Lab Data Review:      6/11/2018  9:26 AM    Recent Labs      06/09/18   0420  06/10/18   0445  06/11/18   0340   SODIUM  143  139  137   POTASSIUM  3.6  3.1*  3.6   CHLORIDE  106  102  103   CO2  31  29  26   BUN  8  6*  5*   CREATININE  0.48*  0.32*  0.43*   MAGNESIUM  1.8  1.5  1.5   CALCIUM  8.1*  8.1*  8.0*       Recent Labs      06/09/18   0420  06/10/18   0248  06/10/18   0445  06/11/18   0340   ALTSGPT  17   --   13  17   ASTSGOT  15   --   9*  15   ALKPHOSPHAT  86   --   79  81   TBILIRUBIN  0.5   --   0.6  0.5   LIPASE   --   7*   --    --    GLUCOSE  132*   --   78  167*       Recent Labs      06/09/18    0420  06/10/18   0445   RBC  3.80*  3.83*   HEMOGLOBIN  11.1*  11.2*   HEMATOCRIT  33.3*  33.0*   PLATELETCT  177  212       Recent Labs      06/09/18   0420  06/10/18   0445  06/11/18   0340   WBC  4.6*  5.9   --    NEUTSPOLYS  41.20*  37.20*   --    LYMPHOCYTES  40.80  46.80*   --    MONOCYTES  11.40  11.80   --    EOSINOPHILS  5.40  3.20   --    BASOPHILS  0.60  0.50   --    ASTSGOT  15  9*  15   ALTSGPT  17  13  17   ALKPHOSPHAT  86  79  81   TBILIRUBIN  0.5  0.6  0.5     Problems:  1. Diabetic gastroparesis  2. Erosive esophagitis, Pocahontas classification grade C likely from #1  3. Gastritis, non-erosive  4. Questionable pyloric stenosis, status post dilation.  5. Hyperlipidemia    Recomendations:  1. Advanced to full liquids, slowly advance diet as tolerated  2. Continue Reglan for gastroparesis.  Recommend drug holiday first 2 days of every month in attempts to minimize risk of tardive dyskinesia.  Patient was warned in layman's terms of tardive dyskinesia and she stated acceptance of these risks.  3. Optimized glycemic control  4. Follow-up on pathology of the biopsies of duodenum and stomach with Dr. Black in 4-6 weeks.   5. GIC signed off.

## 2018-06-12 NOTE — DISCHARGE INSTRUCTIONS
Discharge Instructions    Discharged to home by car with relative. Discharged via wheelchair, hospital escort: Yes.  Special equipment needed: Not Applicable    Be sure to schedule a follow-up appointment with your primary care doctor or any specialists as instructed.     Discharge Plan:   Diet Plan: Discussed  Activity Level: Discussed  Confirmed Follow up Appointment: Appointment Scheduled  Confirmed Symptoms Management: Discussed  Medication Reconciliation Updated: Yes  Influenza Vaccine Indication: Not indicated: Previously immunized this influenza season and > 8 years of age    I understand that a diet low in cholesterol, fat, and sodium is recommended for good health. Unless I have been given specific instructions below for another diet, I accept this instruction as my diet prescription.   Other diet: Regular diabetic     Special Instructions:  For glucose:  70   - 150  mg/dL =      0 Units  151 - 200  mg/dL =    2 Units  201 - 250  mg/dL =    3 Units  251 - 300  mg/dL  =   5 Units  301 - 350  mg/dL  =   6 Units  351 - 400 mg/dL   =   8 Units  Over 400 mg/dL   =   9 Units  Over 400 mg/dL x 2 consecutive FSBG = 9 Units and call MD        · Is patient discharged on Warfarin / Coumadin?   No     Depression / Suicide Risk    As you are discharged from this [facility] facility, it is important to learn how to keep safe from harming yourself.    Recognize the warning signs:  · Abrupt changes in personality, positive or negative- including increase in energy   · Giving away possessions  · Change in eating patterns- significant weight changes-  positive or negative  · Change in sleeping patterns- unable to sleep or sleeping all the time   · Unwillingness or inability to communicate  · Depression  · Unusual sadness, discouragement and loneliness  · Talk of wanting to die  · Neglect of personal appearance   · Rebelliousness- reckless behavior  · Withdrawal from people/activities they love  · Confusion- inability to  concentrate     If you or a loved one observes any of these behaviors or has concerns about self-harm, here's what you can do:  · Talk about it- your feelings and reasons for harming yourself  · Remove any means that you might use to hurt yourself (examples: pills, rope, extension cords, firearm)  · Get professional help from the community (Mental Health, Substance Abuse, psychological counseling)  · Do not be alone:Call your Safe Contact- someone whom you trust who will be there for you.  · Call your local CRISIS HOTLINE 514-6361 or 007-207-7829  · Call your local Children's Mobile Crisis Response Team [place] (745) 999-2003 or www.[employer]  · Call the toll free National Suicide Prevention Hotlines   · National Suicide Prevention Lifeline 228-880-KGKP (5884)  · [employer] Line Network 800-SUICIDE (473-0588)      Gastroparesis  Introduction  Gastroparesis, also called delayed gastric emptying, is a condition in which food takes longer than normal to empty from the stomach. The condition is usually long-lasting (chronic).  What are the causes?  This condition may be caused by:  · An endocrine disorder, such as hypothyroidism or diabetes. Diabetes is the most common cause of this condition.  · A nervous system disease, such as Parkinson disease or multiple sclerosis.  · Cancer, infection, or surgery of the stomach or vagus nerve.  · A connective tissue disorder, such as scleroderma.  · Certain medicines.  In most cases, the cause is not known.  What increases the risk?  This condition is more likely to develop in:  · People with certain disorders, including endocrine disorders, eating disorders, amyloidosis, and scleroderma.  · People with certain diseases, including Parkinson disease or multiple sclerosis.  · People with cancer or infection of the stomach or vagus nerve.  · People who have had surgery on the stomach or vagus nerve.  · People who take certain medicines.  · Women.  What are the signs  or symptoms?  Symptoms of this condition include:  · An early feeling of fullness when eating.  · Nausea.  · Weight loss.  · Vomiting.  · Heartburn.  · Abdominal bloating.  · Inconsistent blood glucose levels.  · Lack of appetite.  · Acid from the stomach coming up into the esophagus (gastroesophageal reflux).  · Spasms of the stomach.  Symptoms may come and go.  How is this diagnosed?  This condition is diagnosed with tests, such as:  · Tests that check how long it takes food to move through the stomach and intestines. These tests include:  ¨ Upper gastrointestinal (GI) series. In this test, X-rays of the intestines are taken after you drink a liquid. The liquid makes the intestines show up better on the X-rays.  ¨ Gastric emptying scintigraphy. In this test, scans are taken after you eat food that contains a small amount of radioactive material.  ¨ Wireless capsule GI monitoring system. This test involves swallowing a capsule that records information about movement through the stomach.  · Gastric manometry. This test measures electrical and muscular activity in the stomach. It is done with a thin tube that is passed down the throat and into the stomach.  · Endoscopy. This test checks for abnormalities in the lining of the stomach. It is done with a long, thin tube that is passed down the throat and into the stomach.  · An ultrasound. This test can help rule out gallbladder disease or pancreatitis as a cause of your symptoms. It uses sound waves to take pictures of the inside of your body.  How is this treated?  There is no cure for gastroparesis. This condition may be managed with:  · Treatment of the underlying condition causing the gastroparesis.  · Lifestyle changes, including exercise and dietary changes. Dietary changes can include:  ¨ Changes in what and when you eat.  ¨ Eating smaller meals more often.  ¨ Eating low-fat foods.  ¨ Eating low-fiber forms of high-fiber foods, such as cooked vegetables instead  of raw vegetables.  ¨ Having liquid foods in place of solid foods. Liquid foods are easier to digest.  · Medicines. These may be given to control nausea and vomiting and to stimulate stomach muscles.  · Getting food through a feeding tube. This may be done in severe cases.  · A gastric neurostimulator. This is a device that is inserted into the body with surgery. It helps improve stomach emptying and control nausea and vomiting.  Follow these instructions at home:  · Follow your health care provider's instructions about exercise and diet.  · Take medicines only as directed by your health care provider.  Contact a health care provider if:  · Your symptoms do not improve with treatment.  · You have new symptoms.  Get help right away if:  · You have severe abdominal pain that does not improve with treatment.  · You have nausea that does not go away.  · You cannot keep fluids down.  This information is not intended to replace advice given to you by your health care provider. Make sure you discuss any questions you have with your health care provider.  Document Released: 12/18/2006 Document Revised: 05/25/2017 Document Reviewed: 12/14/2015  © 2017 [employer]    Diabetic Ketoacidosis  Diabetic ketoacidosis is a life-threatening complication of diabetes. If it is not treated, it can cause severe dehydration and organ damage and can lead to a coma or death.  What are the causes?  This condition develops when there is not enough of the hormone insulin in the body. Insulin helps the body to break down sugar for energy. Without insulin, the body cannot break down sugar, so it breaks down fats instead. This leads to the production of acids that are called ketones. Ketones are poisonous at high levels.  This condition can be triggered by:  · Stress on the body that is brought on by an illness.  · Medicines that raise blood glucose levels.  · Not taking diabetes medicine.  What are the signs or symptoms?  Symptoms of this condition  include:  · Fatigue.  · Weight loss.  · Excessive thirst.  · Light-headedness.  · Fruity or sweet-smelling breath.  · Excessive urination.  · Vision changes.  · Confusion or irritability.  · Nausea.  · Vomiting.  · Rapid breathing.  · Abdominal pain.  · Feeling flushed.  How is this diagnosed?  This condition is diagnosed based on a medical history, a physical exam, and blood tests. You may also have a urine test that checks for ketones.  How is this treated?  This condition may be treated with:  · Fluid replacement. This may be done to correct dehydration.  · Insulin injections. These may be given through the skin or through an IV tube.  · Electrolyte replacement. Electrolytes, such as potassium and sodium, may be given in pill form or through an IV tube.  · Antibiotic medicines. These may be prescribed if your condition was caused by an infection.  Follow these instructions at home:  Eating and drinking  · Drink enough fluids to keep your urine clear or pale yellow.  · If you cannot eat, alternate between drinking fluids with sugar (such as juice) and salty fluids (such as broth or bouillon).  · If you can eat, follow your usual diet and drink sugar-free liquids, such as water.  Other Instructions   · Take insulin as directed by your health care provider. Do not skip insulin injections. Do not use  insulin.  · If your blood sugar is over 240 mg/dL, monitor your urine ketones every 4-6 hours.  · If you were prescribed an antibiotic medicine, finish all of it even if you start to feel better.  · Rest and exercise only as directed by your health care provider.  · If you get sick, call your health care provider and begin treatment quickly. Your body often needs extra insulin to fight an illness.  · Check your blood glucose levels regularly. If your blood glucose is high, drink plenty of fluids. This helps to flush out ketones.  Contact a health care provider if:  · Your blood glucose level is too high or too  low.  · You have ketones in your urine.  · You have a fever.  · You cannot eat.  · You cannot tolerate fluids.  · You have been vomiting for more than 2 hours.  · You continue to have symptoms of this condition.  · You develop new symptoms.  Get help right away if:  · Your blood glucose levels continue to be high (elevated).  · Your monitor reads “high” even when you are taking insulin.  · You faint.  · You have chest pain.  · You have trouble breathing.  · You have a sudden, severe headache.  · You have sudden weakness in one arm or one leg.  · You have sudden trouble speaking or swallowing.  · You have vomiting or diarrhea that gets worse after 3 hours.  · You feel severely fatigued.  · You have trouble thinking.  · You have abdominal pain.  · You are severely dehydrated. Symptoms of severe dehydration include:  ¨ Extreme thirst.  ¨ Dry mouth.  ¨ Blue lips.  ¨ Cold hands and feet.  ¨ Rapid breathing.  This information is not intended to replace advice given to you by your health care provider. Make sure you discuss any questions you have with your health care provider.  Document Released: 12/15/2001 Document Revised: 05/25/2017 Document Reviewed: 11/25/2015  [employer] Patient Education © 2017 [employer]

## 2018-06-12 NOTE — DISCHARGE SUMMARY
Hospital Medicine Discharge Note     Admit Date:  6/2/2018       Discharge Date:   6/11/2018    Attending Physician:  Vikas Stewart M.D.      Diagnoses (includes active and resolved):     Principal Problem:    Gastroparesis due to DM (HCC) POA: Yes, resolved.   Active Problems:    Type 1 diabetes mellitus (HCC) POA: Yes    Diabetic ketoacidosis (HCC) POA: Yes    Hypocalcemia POA: Yes    Hypophosphatemia POA: Yes    Hypokalemia POA: Yes    Hospital Summary (Brief Narrative):         19-year-old female history of type 1 diabetes mellitus, previous DKA, gastroparesis was admitted symptoms of elevated blood sugar nausea vomiting.  She had findings of DKA and was given IV fluids, insulin drip.  With resuscitation patient's DKA resolved.  Her electrolyte was replaced.She had recurrent nausea vomiting consulted by GI and underwent EGD revealing esophagitis, gastritis,  pyloric stenosis with subsequent balloon dilation.  Additional workup for her nausea vomiting with HIDA scan unremarkable.  On reevaluation patient tolerating diet, nausea vomiting resolved.  Blood sugar improved control less than 200.  With markedly improved symptoms patient was discharged home and advised follow-up with outpatient GI, primary care provider.  She has an appointment with endocrinologist for evaluation of insulin pump.      Consultants:        Dr. Lee GI    Imaging/ Testing:      NM-BILIARY (HIDA) SCAN WITH CCK   Final Result         1. Normal hepatobiliary scan. No evidence of acute cholecystitis.      2. Normal gallbladder ejection fraction.         US-ABDOMEN COMPLETE SURVEY   Final Result      Heterogeneously echogenic lesion within the right lobe of the liver measuring 3 x 2.8 x 2.2 cm may represent a hemangioma and was seen on prior ultrasound. Confirmation can be obtained with hepatic protocol MRI.      Hepatomegaly.      No evidence of gallstones or biliary ductal dilatation.      ECHOCARDIOGRAM COMP W/O CONT   Final Result    CONCLUSIONS  Normal transthoracic echocardiogram.   No prior study is available for comparison.   DX-CHEST-LIMITED (1 VIEW)   Final Result      1.  No evidence of pneumothorax following RIGHT neck catheter placement      CT-ABDOMEN-PELVIS WITH   Final Result         1.  Thickening of the distal esophageal wall, new since prior study, consider component of esophagitis. Could be followed up with endoscopy for further evaluation as clinically appropriate.   2.  Hepatomegaly      DX-CHEST-PORTABLE (1 VIEW)   Final Result      No acute cardiac or pulmonary abnormalities are identified.            Procedures:        6-9-18    PreOp Diagnosis: nausea and vomiting      PostOp Diagnosis: esophagitis, gastritis, pyloric stenosis, nausea and vomiting      Procedure(s):  GASTROSCOPY-ENDO     Surgeon(s):  Marino Black M.D.     Anesthesiologist/Type of Anesthesia:  Anesthesiologist: Yahaira Arreaga M.D./* No anesthesia type entered *     Surgical Staff:  Circulator: Dennis Guthrie R.N.  Endoscopy Technician: Alton Baum     Specimens removed if any:  * No specimens in log *     Estimated Blood Loss: < 5 cc     Findings: LA Class Grade C ulcerative esophagitis from 30 cm to 37 cm distal to incisors, non bleeding.  Gastritis suspected with diffuse edema, erythema, few erosions in antrum.  BIopsied.   Duodenal biopsies obtained to rule out duodenitis/Celiac Sprue in this diabetic patient with GI symptoms.  Pyloric channel somewhat tight, requiring moderate pressure to pass the endoscope.  The pylorus was dilated up to 20 mm using balloon.       Complications: no immediate     IMPRESSION(S):  1) Esophagitis  2) Gastritis, suspected  3) Pyloric stenosis, status post balloon dilation      Discharge Medications:             Medication List      START taking these medications      Instructions   gabapentin 100 MG Caps  Commonly known as:  NEURONTIN   Take 1 Cap by mouth 2 Times a Day.  Dose:  100 mg     insulin  regular 100 Unit/mL Soln  Commonly known as:  HUMULIN R   Inject 2-9 Units as instructed 4 Times a Day,Before Meals and at Bedtime. Per sliding scale as instructed.  Dose:  2-9 Units     omeprazole 20 MG delayed-release capsule  Commonly known as:  PRILOSEC   Take 1 Cap by mouth every day.  Dose:  20 mg     sucralfate 1 GM/10ML Susp  Commonly known as:  CARAFATE   Take 10 mL by mouth every 6 hours.  Dose:  1 g        CHANGE how you take these medications      Instructions   insulin glargine 100 UNIT/ML Soln  What changed:  · medication strength  · how much to take  · when to take this  Commonly known as:  LANTUS   Inject 40 Units as instructed every evening.  Dose:  40 Units     insulin glulisine 100 UNIT/ML Soln  What changed:  · how much to take  · how to take this  · when to take this  · additional instructions  Commonly known as:  APIDRA   Take per home sliding scale three times daily before meals        CONTINUE taking these medications      Instructions   atorvastatin 20 MG Tabs  Commonly known as:  LIPITOR   Take 1 Tab by mouth every day.  Dose:  20 mg     Cholecalciferol 2000 UNIT Caps   Take 1 Cap by mouth every day.  Dose:  1 Cap     ferrous sulfate 325 (65 Fe) MG tablet   Take 325 mg by mouth every day.  Dose:  325 mg     metoclopramide 10 MG/10ML Soln  Commonly known as:  REGLAN   Take 10 mg by mouth 3 times a day before meals.  Dose:  10 mg               Diet:       DIET ORDERS (Through next 24h)    Start     Ordered   ADA diet       Activity:   As tolerated.      Code status:   Full code    Primary Care Provider:    Navi Huber M.D.    Follow up appointment details :      .  Navi Huber M.D.  [address] [facility] [address]  [place] [address]  [phone]    In 1 week      Marino Black M.D.  [address] [facility]  [address]  [place] [address]  [phone]    In 2 weeks      Endocrinology as instructed           Future Appointments  Date Time Provider Department Center   8/30/2018 10:00 AM Jose ORTIZ  Field, M.D. [facility] None           Time spent on discharge day patient visit: 40 minutes    #################################################

## 2018-06-12 NOTE — PROGRESS NOTES
Patient discharged    IV removed prior to discharge.   Signed prescriptions given to patient.  Discharge education provided, all questions answered.   Verbalized understanding of discharge education.   Wheeled out with all personal belongings collected from room.

## 2018-06-13 ENCOUNTER — APPOINTMENT (OUTPATIENT)
Dept: RADIOLOGY | Facility: MEDICAL CENTER | Age: 28
DRG: 637 | End: 2018-06-13
Attending: INTERNAL MEDICINE
Payer: MEDICAID

## 2018-06-13 ENCOUNTER — HOSPITAL ENCOUNTER (INPATIENT)
Facility: MEDICAL CENTER | Age: 28
LOS: 16 days | DRG: 637 | End: 2018-06-29
Attending: EMERGENCY MEDICINE | Admitting: FAMILY MEDICINE
Payer: MEDICAID

## 2018-06-13 DIAGNOSIS — E10.10 DIABETIC KETOACIDOSIS WITHOUT COMA ASSOCIATED WITH TYPE 1 DIABETES MELLITUS (HCC): Primary | ICD-10-CM

## 2018-06-13 DIAGNOSIS — R11.2 INTRACTABLE VOMITING WITH NAUSEA, UNSPECIFIED VOMITING TYPE: ICD-10-CM

## 2018-06-13 DIAGNOSIS — K31.84 GASTROPARESIS: ICD-10-CM

## 2018-06-13 DIAGNOSIS — K31.84 GASTROPARESIS DUE TO DM (HCC): ICD-10-CM

## 2018-06-13 DIAGNOSIS — E08.10 DIABETIC KETOACIDOSIS WITHOUT COMA ASSOCIATED WITH DIABETES MELLITUS DUE TO UNDERLYING CONDITION (HCC): ICD-10-CM

## 2018-06-13 DIAGNOSIS — E11.43 GASTROPARESIS DUE TO DM (HCC): ICD-10-CM

## 2018-06-13 PROBLEM — K20.90 ESOPHAGITIS: Status: ACTIVE | Noted: 2018-06-13

## 2018-06-13 PROBLEM — K31.1 PYLORIC STENOSIS: Chronic | Status: ACTIVE | Noted: 2018-06-13

## 2018-06-13 PROBLEM — R00.0 TACHYCARDIA: Status: ACTIVE | Noted: 2018-06-13

## 2018-06-13 LAB
ALBUMIN SERPL BCP-MCNC: 3.7 G/DL (ref 3.2–4.9)
ALBUMIN SERPL BCP-MCNC: 4 G/DL (ref 3.2–4.9)
ALBUMIN/GLOB SERPL: 1.2 G/DL
ALBUMIN/GLOB SERPL: 1.4 G/DL
ALP SERPL-CCNC: 105 U/L (ref 30–99)
ALP SERPL-CCNC: 93 U/L (ref 30–99)
ALT SERPL-CCNC: 13 U/L (ref 2–50)
ALT SERPL-CCNC: 17 U/L (ref 2–50)
ANION GAP SERPL CALC-SCNC: 11 MMOL/L (ref 0–11.9)
ANION GAP SERPL CALC-SCNC: 12 MMOL/L (ref 0–11.9)
ANION GAP SERPL CALC-SCNC: 21 MMOL/L (ref 0–11.9)
ANION GAP SERPL CALC-SCNC: 23 MMOL/L (ref 0–11.9)
ANION GAP SERPL CALC-SCNC: 9 MMOL/L (ref 0–11.9)
APPEARANCE UR: CLEAR
AST SERPL-CCNC: 12 U/L (ref 12–45)
AST SERPL-CCNC: 14 U/L (ref 12–45)
B-OH-BUTYR SERPL-MCNC: 6.97 MMOL/L (ref 0.02–0.27)
BASE EXCESS BLDV CALC-SCNC: -12 MMOL/L
BASOPHILS # BLD AUTO: 0 % (ref 0–1.8)
BASOPHILS # BLD AUTO: 0.4 % (ref 0–1.8)
BASOPHILS # BLD: 0 K/UL (ref 0–0.12)
BASOPHILS # BLD: 0.04 K/UL (ref 0–0.12)
BILIRUB SERPL-MCNC: 0.7 MG/DL (ref 0.1–1.5)
BILIRUB SERPL-MCNC: 0.7 MG/DL (ref 0.1–1.5)
BILIRUB UR QL STRIP.AUTO: NEGATIVE
BODY TEMPERATURE: ABNORMAL CENTIGRADE
BUN SERPL-MCNC: 11 MG/DL (ref 8–22)
BUN SERPL-MCNC: 13 MG/DL (ref 8–22)
BUN SERPL-MCNC: 7 MG/DL (ref 8–22)
BUN SERPL-MCNC: 7 MG/DL (ref 8–22)
BUN SERPL-MCNC: 9 MG/DL (ref 8–22)
CALCIUM SERPL-MCNC: 7.9 MG/DL (ref 8.5–10.5)
CALCIUM SERPL-MCNC: 8 MG/DL (ref 8.5–10.5)
CALCIUM SERPL-MCNC: 8.1 MG/DL (ref 8.5–10.5)
CALCIUM SERPL-MCNC: 8.6 MG/DL (ref 8.5–10.5)
CALCIUM SERPL-MCNC: 9 MG/DL (ref 8.5–10.5)
CHLORIDE SERPL-SCNC: 104 MMOL/L (ref 96–112)
CHLORIDE SERPL-SCNC: 110 MMOL/L (ref 96–112)
CHLORIDE SERPL-SCNC: 113 MMOL/L (ref 96–112)
CHLORIDE SERPL-SCNC: 114 MMOL/L (ref 96–112)
CHLORIDE SERPL-SCNC: 99 MMOL/L (ref 96–112)
CO2 SERPL-SCNC: 12 MMOL/L (ref 20–33)
CO2 SERPL-SCNC: 12 MMOL/L (ref 20–33)
CO2 SERPL-SCNC: 14 MMOL/L (ref 20–33)
CO2 SERPL-SCNC: 16 MMOL/L (ref 20–33)
CO2 SERPL-SCNC: 8 MMOL/L (ref 20–33)
COLOR UR: YELLOW
CREAT SERPL-MCNC: 0.49 MG/DL (ref 0.5–1.4)
CREAT SERPL-MCNC: 0.52 MG/DL (ref 0.5–1.4)
CREAT SERPL-MCNC: 0.57 MG/DL (ref 0.5–1.4)
CREAT SERPL-MCNC: 0.57 MG/DL (ref 0.5–1.4)
CREAT SERPL-MCNC: 0.7 MG/DL (ref 0.5–1.4)
EOSINOPHIL # BLD AUTO: 0.11 K/UL (ref 0–0.51)
EOSINOPHIL # BLD AUTO: 0.16 K/UL (ref 0–0.51)
EOSINOPHIL NFR BLD: 0.9 % (ref 0–6.9)
EOSINOPHIL NFR BLD: 1.6 % (ref 0–6.9)
ERYTHROCYTE [DISTWIDTH] IN BLOOD BY AUTOMATED COUNT: 39.4 FL (ref 35.9–50)
ERYTHROCYTE [DISTWIDTH] IN BLOOD BY AUTOMATED COUNT: 41.9 FL (ref 35.9–50)
GLOBULIN SER CALC-MCNC: 2.8 G/DL (ref 1.9–3.5)
GLOBULIN SER CALC-MCNC: 3 G/DL (ref 1.9–3.5)
GLUCOSE BLD-MCNC: 103 MG/DL (ref 65–99)
GLUCOSE BLD-MCNC: 108 MG/DL (ref 65–99)
GLUCOSE BLD-MCNC: 108 MG/DL (ref 65–99)
GLUCOSE BLD-MCNC: 145 MG/DL (ref 65–99)
GLUCOSE BLD-MCNC: 163 MG/DL (ref 65–99)
GLUCOSE BLD-MCNC: 180 MG/DL (ref 65–99)
GLUCOSE BLD-MCNC: 192 MG/DL (ref 65–99)
GLUCOSE BLD-MCNC: 239 MG/DL (ref 65–99)
GLUCOSE BLD-MCNC: 341 MG/DL (ref 65–99)
GLUCOSE BLD-MCNC: 412 MG/DL (ref 65–99)
GLUCOSE BLD-MCNC: 445 MG/DL (ref 65–99)
GLUCOSE BLD-MCNC: 466 MG/DL (ref 65–99)
GLUCOSE SERPL-MCNC: 118 MG/DL (ref 65–99)
GLUCOSE SERPL-MCNC: 127 MG/DL (ref 65–99)
GLUCOSE SERPL-MCNC: 197 MG/DL (ref 65–99)
GLUCOSE SERPL-MCNC: 510 MG/DL (ref 65–99)
GLUCOSE SERPL-MCNC: 533 MG/DL (ref 65–99)
GLUCOSE UR STRIP.AUTO-MCNC: >=1000 MG/DL
HCG SERPL QL: NEGATIVE
HCO3 BLDV-SCNC: 13 MMOL/L (ref 24–28)
HCT VFR BLD AUTO: 39.1 % (ref 37–47)
HCT VFR BLD AUTO: 41.2 % (ref 37–47)
HGB BLD-MCNC: 13.3 G/DL (ref 12–16)
HGB BLD-MCNC: 13.5 G/DL (ref 12–16)
IMM GRANULOCYTES # BLD AUTO: 0.09 K/UL (ref 0–0.11)
IMM GRANULOCYTES NFR BLD AUTO: 0.9 % (ref 0–0.9)
KETONES UR STRIP.AUTO-MCNC: >=160 MG/DL
LEUKOCYTE ESTERASE UR QL STRIP.AUTO: NEGATIVE
LIPASE SERPL-CCNC: 16 U/L (ref 11–82)
LYMPHOCYTES # BLD AUTO: 1.03 K/UL (ref 1–4.8)
LYMPHOCYTES # BLD AUTO: 2.14 K/UL (ref 1–4.8)
LYMPHOCYTES NFR BLD: 21.7 % (ref 22–41)
LYMPHOCYTES NFR BLD: 8.8 % (ref 22–41)
MAGNESIUM SERPL-MCNC: 1.6 MG/DL (ref 1.5–2.5)
MAGNESIUM SERPL-MCNC: 1.6 MG/DL (ref 1.5–2.5)
MAGNESIUM SERPL-MCNC: 1.9 MG/DL (ref 1.5–2.5)
MAGNESIUM SERPL-MCNC: 2.1 MG/DL (ref 1.5–2.5)
MANUAL DIFF BLD: NORMAL
MCH RBC QN AUTO: 29.2 PG (ref 27–33)
MCH RBC QN AUTO: 29.4 PG (ref 27–33)
MCHC RBC AUTO-ENTMCNC: 32.8 G/DL (ref 33.6–35)
MCHC RBC AUTO-ENTMCNC: 34 G/DL (ref 33.6–35)
MCV RBC AUTO: 86.5 FL (ref 81.4–97.8)
MCV RBC AUTO: 89.2 FL (ref 81.4–97.8)
MICRO URNS: ABNORMAL
MONOCYTES # BLD AUTO: 0.32 K/UL (ref 0–0.85)
MONOCYTES # BLD AUTO: 0.79 K/UL (ref 0–0.85)
MONOCYTES NFR BLD AUTO: 2.7 % (ref 0–13.4)
MONOCYTES NFR BLD AUTO: 8 % (ref 0–13.4)
MORPHOLOGY BLD-IMP: NORMAL
NEUTROPHILS # BLD AUTO: 10.25 K/UL (ref 2–7.15)
NEUTROPHILS # BLD AUTO: 6.65 K/UL (ref 2–7.15)
NEUTROPHILS NFR BLD: 67.4 % (ref 44–72)
NEUTROPHILS NFR BLD: 87.6 % (ref 44–72)
NITRITE UR QL STRIP.AUTO: NEGATIVE
NRBC # BLD AUTO: 0 K/UL
NRBC # BLD AUTO: 0 K/UL
NRBC BLD-RTO: 0 /100 WBC
NRBC BLD-RTO: 0 /100 WBC
PCO2 BLDV: 28.9 MMHG (ref 41–51)
PH BLDV: 7.28 [PH] (ref 7.31–7.45)
PH UR STRIP.AUTO: 5 [PH]
PHOSPHATE SERPL-MCNC: 2.3 MG/DL (ref 2.5–4.5)
PHOSPHATE SERPL-MCNC: 2.4 MG/DL (ref 2.5–4.5)
PHOSPHATE SERPL-MCNC: 3.2 MG/DL (ref 2.5–4.5)
PHOSPHATE SERPL-MCNC: 3.8 MG/DL (ref 2.5–4.5)
PLATELET # BLD AUTO: 227 K/UL (ref 164–446)
PLATELET # BLD AUTO: 257 K/UL (ref 164–446)
PLATELET BLD QL SMEAR: NORMAL
PMV BLD AUTO: 10.3 FL (ref 9–12.9)
PMV BLD AUTO: 11 FL (ref 9–12.9)
PO2 BLDV: 36.4 MMHG (ref 25–40)
POTASSIUM SERPL-SCNC: 3.9 MMOL/L (ref 3.6–5.5)
POTASSIUM SERPL-SCNC: 4 MMOL/L (ref 3.6–5.5)
POTASSIUM SERPL-SCNC: 4.4 MMOL/L (ref 3.6–5.5)
POTASSIUM SERPL-SCNC: 4.9 MMOL/L (ref 3.6–5.5)
POTASSIUM SERPL-SCNC: 5 MMOL/L (ref 3.6–5.5)
PROT SERPL-MCNC: 6.7 G/DL (ref 6–8.2)
PROT SERPL-MCNC: 6.8 G/DL (ref 6–8.2)
PROT UR QL STRIP: NEGATIVE MG/DL
RBC # BLD AUTO: 4.52 M/UL (ref 4.2–5.4)
RBC # BLD AUTO: 4.62 M/UL (ref 4.2–5.4)
RBC BLD AUTO: NORMAL
RBC UR QL AUTO: NEGATIVE
SAO2 % BLDV: 64.1 %
SODIUM SERPL-SCNC: 132 MMOL/L (ref 135–145)
SODIUM SERPL-SCNC: 135 MMOL/L (ref 135–145)
SODIUM SERPL-SCNC: 135 MMOL/L (ref 135–145)
SODIUM SERPL-SCNC: 138 MMOL/L (ref 135–145)
SODIUM SERPL-SCNC: 138 MMOL/L (ref 135–145)
SP GR UR STRIP.AUTO: 1.03
UROBILINOGEN UR STRIP.AUTO-MCNC: 0.2 MG/DL
WBC # BLD AUTO: 11.7 K/UL (ref 4.8–10.8)
WBC # BLD AUTO: 9.9 K/UL (ref 4.8–10.8)

## 2018-06-13 PROCEDURE — 700102 HCHG RX REV CODE 250 W/ 637 OVERRIDE(OP): Performed by: EMERGENCY MEDICINE

## 2018-06-13 PROCEDURE — 700105 HCHG RX REV CODE 258: Performed by: EMERGENCY MEDICINE

## 2018-06-13 PROCEDURE — 84100 ASSAY OF PHOSPHORUS: CPT

## 2018-06-13 PROCEDURE — 96368 THER/DIAG CONCURRENT INF: CPT

## 2018-06-13 PROCEDURE — A9270 NON-COVERED ITEM OR SERVICE: HCPCS | Performed by: FAMILY MEDICINE

## 2018-06-13 PROCEDURE — 85027 COMPLETE CBC AUTOMATED: CPT

## 2018-06-13 PROCEDURE — 700111 HCHG RX REV CODE 636 W/ 250 OVERRIDE (IP): Performed by: EMERGENCY MEDICINE

## 2018-06-13 PROCEDURE — 700105 HCHG RX REV CODE 258: Performed by: FAMILY MEDICINE

## 2018-06-13 PROCEDURE — 770022 HCHG ROOM/CARE - ICU (200)

## 2018-06-13 PROCEDURE — 85007 BL SMEAR W/DIFF WBC COUNT: CPT

## 2018-06-13 PROCEDURE — 99291 CRITICAL CARE FIRST HOUR: CPT

## 2018-06-13 PROCEDURE — 80053 COMPREHEN METABOLIC PANEL: CPT

## 2018-06-13 PROCEDURE — 93005 ELECTROCARDIOGRAM TRACING: CPT | Performed by: INTERNAL MEDICINE

## 2018-06-13 PROCEDURE — 84703 CHORIONIC GONADOTROPIN ASSAY: CPT

## 2018-06-13 PROCEDURE — 94760 N-INVAS EAR/PLS OXIMETRY 1: CPT

## 2018-06-13 PROCEDURE — 700111 HCHG RX REV CODE 636 W/ 250 OVERRIDE (IP): Performed by: INTERNAL MEDICINE

## 2018-06-13 PROCEDURE — 700111 HCHG RX REV CODE 636 W/ 250 OVERRIDE (IP)

## 2018-06-13 PROCEDURE — 700111 HCHG RX REV CODE 636 W/ 250 OVERRIDE (IP): Performed by: FAMILY MEDICINE

## 2018-06-13 PROCEDURE — 83690 ASSAY OF LIPASE: CPT

## 2018-06-13 PROCEDURE — 82962 GLUCOSE BLOOD TEST: CPT

## 2018-06-13 PROCEDURE — 80048 BASIC METABOLIC PNL TOTAL CA: CPT | Mod: 91

## 2018-06-13 PROCEDURE — 71045 X-RAY EXAM CHEST 1 VIEW: CPT

## 2018-06-13 PROCEDURE — 96365 THER/PROPH/DIAG IV INF INIT: CPT

## 2018-06-13 PROCEDURE — 85025 COMPLETE CBC W/AUTO DIFF WBC: CPT

## 2018-06-13 PROCEDURE — 82010 KETONE BODYS QUAN: CPT

## 2018-06-13 PROCEDURE — 81003 URINALYSIS AUTO W/O SCOPE: CPT

## 2018-06-13 PROCEDURE — 96361 HYDRATE IV INFUSION ADD-ON: CPT

## 2018-06-13 PROCEDURE — 82803 BLOOD GASES ANY COMBINATION: CPT

## 2018-06-13 PROCEDURE — 99291 CRITICAL CARE FIRST HOUR: CPT | Performed by: INTERNAL MEDICINE

## 2018-06-13 PROCEDURE — 96375 TX/PRO/DX INJ NEW DRUG ADDON: CPT

## 2018-06-13 PROCEDURE — 700102 HCHG RX REV CODE 250 W/ 637 OVERRIDE(OP): Performed by: FAMILY MEDICINE

## 2018-06-13 PROCEDURE — 96367 TX/PROPH/DG ADDL SEQ IV INF: CPT

## 2018-06-13 PROCEDURE — 96366 THER/PROPH/DIAG IV INF ADDON: CPT

## 2018-06-13 PROCEDURE — 83735 ASSAY OF MAGNESIUM: CPT

## 2018-06-13 PROCEDURE — 36415 COLL VENOUS BLD VENIPUNCTURE: CPT

## 2018-06-13 PROCEDURE — 93010 ELECTROCARDIOGRAM REPORT: CPT | Performed by: INTERNAL MEDICINE

## 2018-06-13 PROCEDURE — 99291 CRITICAL CARE FIRST HOUR: CPT | Performed by: FAMILY MEDICINE

## 2018-06-13 RX ORDER — LABETALOL HYDROCHLORIDE 5 MG/ML
10 INJECTION, SOLUTION INTRAVENOUS EVERY 4 HOURS PRN
Status: DISCONTINUED | OUTPATIENT
Start: 2018-06-13 | End: 2018-06-29 | Stop reason: HOSPADM

## 2018-06-13 RX ORDER — POTASSIUM CHLORIDE 7.45 MG/ML
10 INJECTION INTRAVENOUS ONCE
Status: DISCONTINUED | OUTPATIENT
Start: 2018-06-13 | End: 2018-06-13

## 2018-06-13 RX ORDER — DEXTROSE AND SODIUM CHLORIDE 5; .9 G/100ML; G/100ML
INJECTION, SOLUTION INTRAVENOUS CONTINUOUS
Status: DISCONTINUED | OUTPATIENT
Start: 2018-06-13 | End: 2018-06-15

## 2018-06-13 RX ORDER — POTASSIUM CHLORIDE 7.45 MG/ML
10 INJECTION INTRAVENOUS ONCE
Status: COMPLETED | OUTPATIENT
Start: 2018-06-13 | End: 2018-06-13

## 2018-06-13 RX ORDER — METOCLOPRAMIDE HYDROCHLORIDE 5 MG/5ML
10 SOLUTION ORAL
Status: DISCONTINUED | OUTPATIENT
Start: 2018-06-13 | End: 2018-06-14

## 2018-06-13 RX ORDER — SODIUM CHLORIDE 9 MG/ML
2000 INJECTION, SOLUTION INTRAVENOUS ONCE
Status: COMPLETED | OUTPATIENT
Start: 2018-06-13 | End: 2018-06-13

## 2018-06-13 RX ORDER — MAGNESIUM SULFATE HEPTAHYDRATE 40 MG/ML
2 INJECTION, SOLUTION INTRAVENOUS ONCE
Status: DISCONTINUED | OUTPATIENT
Start: 2018-06-13 | End: 2018-06-13

## 2018-06-13 RX ORDER — OMEPRAZOLE 20 MG/1
40 CAPSULE, DELAYED RELEASE ORAL DAILY
Status: DISCONTINUED | OUTPATIENT
Start: 2018-06-13 | End: 2018-06-15

## 2018-06-13 RX ORDER — AMOXICILLIN 250 MG
2 CAPSULE ORAL 2 TIMES DAILY
Status: DISCONTINUED | OUTPATIENT
Start: 2018-06-13 | End: 2018-06-29 | Stop reason: HOSPADM

## 2018-06-13 RX ORDER — BISACODYL 10 MG
10 SUPPOSITORY, RECTAL RECTAL
Status: DISCONTINUED | OUTPATIENT
Start: 2018-06-13 | End: 2018-06-29 | Stop reason: HOSPADM

## 2018-06-13 RX ORDER — SUCRALFATE ORAL 1 G/10ML
1 SUSPENSION ORAL EVERY 6 HOURS
Status: DISCONTINUED | OUTPATIENT
Start: 2018-06-13 | End: 2018-06-29 | Stop reason: HOSPADM

## 2018-06-13 RX ORDER — PROMETHAZINE HYDROCHLORIDE 25 MG/1
12.5-25 SUPPOSITORY RECTAL EVERY 4 HOURS PRN
Status: DISCONTINUED | OUTPATIENT
Start: 2018-06-13 | End: 2018-06-15

## 2018-06-13 RX ORDER — ONDANSETRON 4 MG/1
4 TABLET, ORALLY DISINTEGRATING ORAL EVERY 4 HOURS PRN
Status: DISCONTINUED | OUTPATIENT
Start: 2018-06-13 | End: 2018-06-29 | Stop reason: HOSPADM

## 2018-06-13 RX ORDER — MORPHINE SULFATE 4 MG/ML
4 INJECTION, SOLUTION INTRAMUSCULAR; INTRAVENOUS EVERY 4 HOURS PRN
Status: DISCONTINUED | OUTPATIENT
Start: 2018-06-13 | End: 2018-06-20

## 2018-06-13 RX ORDER — POTASSIUM CHLORIDE 7.45 MG/ML
10 INJECTION INTRAVENOUS
Status: COMPLETED | OUTPATIENT
Start: 2018-06-13 | End: 2018-06-13

## 2018-06-13 RX ORDER — MORPHINE SULFATE 4 MG/ML
INJECTION, SOLUTION INTRAMUSCULAR; INTRAVENOUS
Status: COMPLETED
Start: 2018-06-13 | End: 2018-06-13

## 2018-06-13 RX ORDER — PROMETHAZINE HYDROCHLORIDE 25 MG/1
12.5-25 TABLET ORAL EVERY 4 HOURS PRN
Status: DISCONTINUED | OUTPATIENT
Start: 2018-06-13 | End: 2018-06-15

## 2018-06-13 RX ORDER — ATORVASTATIN CALCIUM 20 MG/1
20 TABLET, FILM COATED ORAL DAILY
Status: DISCONTINUED | OUTPATIENT
Start: 2018-06-13 | End: 2018-06-29 | Stop reason: HOSPADM

## 2018-06-13 RX ORDER — ACETAMINOPHEN 325 MG/1
650 TABLET ORAL EVERY 6 HOURS PRN
Status: DISCONTINUED | OUTPATIENT
Start: 2018-06-13 | End: 2018-06-29 | Stop reason: HOSPADM

## 2018-06-13 RX ORDER — METOCLOPRAMIDE HYDROCHLORIDE 5 MG/ML
10 INJECTION INTRAMUSCULAR; INTRAVENOUS ONCE
Status: COMPLETED | OUTPATIENT
Start: 2018-06-13 | End: 2018-06-13

## 2018-06-13 RX ORDER — MAGNESIUM SULFATE HEPTAHYDRATE 40 MG/ML
2 INJECTION, SOLUTION INTRAVENOUS
Status: COMPLETED | OUTPATIENT
Start: 2018-06-13 | End: 2018-06-13

## 2018-06-13 RX ORDER — SODIUM CHLORIDE 9 MG/ML
INJECTION, SOLUTION INTRAVENOUS CONTINUOUS
Status: DISCONTINUED | OUTPATIENT
Start: 2018-06-13 | End: 2018-06-15

## 2018-06-13 RX ORDER — INSULIN GLARGINE 100 [IU]/ML
33 INJECTION, SOLUTION SUBCUTANEOUS 2 TIMES DAILY
Status: ON HOLD | COMMUNITY
End: 2018-06-28

## 2018-06-13 RX ORDER — POTASSIUM CHLORIDE 7.45 MG/ML
10 INJECTION INTRAVENOUS ONCE
Status: COMPLETED | OUTPATIENT
Start: 2018-06-13 | End: 2018-06-14

## 2018-06-13 RX ORDER — MAGNESIUM SULFATE HEPTAHYDRATE 40 MG/ML
4 INJECTION, SOLUTION INTRAVENOUS
Status: COMPLETED | OUTPATIENT
Start: 2018-06-13 | End: 2018-06-13

## 2018-06-13 RX ORDER — FERROUS SULFATE 325(65) MG
325 TABLET ORAL DAILY
Status: DISCONTINUED | OUTPATIENT
Start: 2018-06-13 | End: 2018-06-17

## 2018-06-13 RX ORDER — GABAPENTIN 100 MG/1
100 CAPSULE ORAL 2 TIMES DAILY
Status: DISCONTINUED | OUTPATIENT
Start: 2018-06-13 | End: 2018-06-29 | Stop reason: HOSPADM

## 2018-06-13 RX ORDER — POLYETHYLENE GLYCOL 3350 17 G/17G
1 POWDER, FOR SOLUTION ORAL
Status: DISCONTINUED | OUTPATIENT
Start: 2018-06-13 | End: 2018-06-29 | Stop reason: HOSPADM

## 2018-06-13 RX ORDER — SODIUM CHLORIDE 9 MG/ML
INJECTION, SOLUTION INTRAVENOUS CONTINUOUS
Status: DISCONTINUED | OUTPATIENT
Start: 2018-06-13 | End: 2018-06-13

## 2018-06-13 RX ORDER — DEXTROSE AND SODIUM CHLORIDE 10; .45 G/100ML; G/100ML
INJECTION, SOLUTION INTRAVENOUS CONTINUOUS
Status: DISCONTINUED | OUTPATIENT
Start: 2018-06-13 | End: 2018-06-16

## 2018-06-13 RX ORDER — ONDANSETRON 2 MG/ML
4 INJECTION INTRAMUSCULAR; INTRAVENOUS EVERY 4 HOURS PRN
Status: DISCONTINUED | OUTPATIENT
Start: 2018-06-13 | End: 2018-06-29 | Stop reason: HOSPADM

## 2018-06-13 RX ORDER — DEXTROSE MONOHYDRATE 25 G/50ML
25 INJECTION, SOLUTION INTRAVENOUS
Status: DISCONTINUED | OUTPATIENT
Start: 2018-06-13 | End: 2018-06-13

## 2018-06-13 RX ADMIN — DEXTROSE AND SODIUM CHLORIDE: 10; .45 INJECTION, SOLUTION INTRAVENOUS at 15:30

## 2018-06-13 RX ADMIN — SODIUM CHLORIDE 2000 ML: 9 INJECTION, SOLUTION INTRAVENOUS at 06:15

## 2018-06-13 RX ADMIN — FENTANYL CITRATE 100 MCG: 50 INJECTION, SOLUTION INTRAMUSCULAR; INTRAVENOUS at 06:15

## 2018-06-13 RX ADMIN — POTASSIUM CHLORIDE 10 MEQ: 10 INJECTION, SOLUTION INTRAVENOUS at 19:00

## 2018-06-13 RX ADMIN — METOCLOPRAMIDE HYDROCHLORIDE 10 MG: 5 SOLUTION ORAL at 12:12

## 2018-06-13 RX ADMIN — POTASSIUM CHLORIDE 10 MEQ: 10 INJECTION, SOLUTION INTRAVENOUS at 14:13

## 2018-06-13 RX ADMIN — PROCHLORPERAZINE EDISYLATE 10 MG: 5 INJECTION INTRAMUSCULAR; INTRAVENOUS at 18:27

## 2018-06-13 RX ADMIN — SUCRALFATE 1 G: 1 SUSPENSION ORAL at 17:00

## 2018-06-13 RX ADMIN — ONDANSETRON 4 MG: 2 INJECTION INTRAMUSCULAR; INTRAVENOUS at 11:15

## 2018-06-13 RX ADMIN — MORPHINE SULFATE 4 MG: 4 INJECTION INTRAVENOUS at 21:32

## 2018-06-13 RX ADMIN — SODIUM CHLORIDE 4 UNITS/HR: 9 INJECTION, SOLUTION INTRAVENOUS at 07:51

## 2018-06-13 RX ADMIN — SODIUM CHLORIDE 4 UNITS/HR: 9 INJECTION, SOLUTION INTRAVENOUS at 08:17

## 2018-06-13 RX ADMIN — PROCHLORPERAZINE EDISYLATE 10 MG: 5 INJECTION INTRAMUSCULAR; INTRAVENOUS at 08:36

## 2018-06-13 RX ADMIN — ONDANSETRON 4 MG: 2 INJECTION INTRAMUSCULAR; INTRAVENOUS at 21:37

## 2018-06-13 RX ADMIN — POTASSIUM CHLORIDE 10 MEQ: 10 INJECTION, SOLUTION INTRAVENOUS at 18:16

## 2018-06-13 RX ADMIN — POTASSIUM CHLORIDE 10 MEQ: 10 INJECTION, SOLUTION INTRAVENOUS at 08:20

## 2018-06-13 RX ADMIN — POTASSIUM CHLORIDE 10 MEQ: 10 INJECTION, SOLUTION INTRAVENOUS at 15:29

## 2018-06-13 RX ADMIN — MORPHINE SULFATE 4 MG: 4 INJECTION INTRAVENOUS at 12:14

## 2018-06-13 RX ADMIN — MAGNESIUM SULFATE HEPTAHYDRATE 2 G: 40 INJECTION, SOLUTION INTRAVENOUS at 08:31

## 2018-06-13 RX ADMIN — DEXTROSE AND SODIUM CHLORIDE: 5; 900 INJECTION, SOLUTION INTRAVENOUS at 10:31

## 2018-06-13 RX ADMIN — MORPHINE SULFATE 4 MG: 4 INJECTION INTRAVENOUS at 08:17

## 2018-06-13 RX ADMIN — SODIUM CHLORIDE 2000 ML: 9 INJECTION, SOLUTION INTRAVENOUS at 08:39

## 2018-06-13 RX ADMIN — INSULIN HUMAN 8 UNITS: 100 INJECTION, SOLUTION PARENTERAL at 08:27

## 2018-06-13 RX ADMIN — METOCLOPRAMIDE 10 MG: 5 INJECTION, SOLUTION INTRAMUSCULAR; INTRAVENOUS at 06:19

## 2018-06-13 RX ADMIN — MORPHINE SULFATE 4 MG: 4 INJECTION INTRAVENOUS at 16:25

## 2018-06-13 RX ADMIN — SUCRALFATE 1 G: 1 SUSPENSION ORAL at 14:13

## 2018-06-13 RX ADMIN — METOCLOPRAMIDE HYDROCHLORIDE 10 MG: 5 SOLUTION ORAL at 16:28

## 2018-06-13 RX ADMIN — POTASSIUM CHLORIDE 10 MEQ: 10 INJECTION, SOLUTION INTRAVENOUS at 22:52

## 2018-06-13 RX ADMIN — ONDANSETRON 4 MG: 2 INJECTION INTRAMUSCULAR; INTRAVENOUS at 16:27

## 2018-06-13 ASSESSMENT — PAIN SCALES - GENERAL
PAINLEVEL_OUTOF10: 8
PAINLEVEL_OUTOF10: 4
PAINLEVEL_OUTOF10: 8
PAINLEVEL_OUTOF10: 9
PAINLEVEL_OUTOF10: 5
PAINLEVEL_OUTOF10: 6
PAINLEVEL_OUTOF10: 9
PAINLEVEL_OUTOF10: 8
PAINLEVEL_OUTOF10: 5
PAINLEVEL_OUTOF10: 5
PAINLEVEL_OUTOF10: 6

## 2018-06-13 ASSESSMENT — PATIENT HEALTH QUESTIONNAIRE - PHQ9
SUM OF ALL RESPONSES TO PHQ9 QUESTIONS 1 AND 2: 0
2. FEELING DOWN, DEPRESSED, IRRITABLE, OR HOPELESS: NOT AT ALL
1. LITTLE INTEREST OR PLEASURE IN DOING THINGS: NOT AT ALL

## 2018-06-13 ASSESSMENT — ENCOUNTER SYMPTOMS
BACK PAIN: 0
DEPRESSION: 0
COUGH: 0
NAUSEA: 1
HEARTBURN: 0
NECK PAIN: 0
CHILLS: 1
DIARRHEA: 0
ABDOMINAL PAIN: 1
DOUBLE VISION: 0
BRUISES/BLEEDS EASILY: 0
BLURRED VISION: 0
HEADACHES: 0
FEVER: 0
BLOOD IN STOOL: 0
DIAPHORESIS: 1
DIZZINESS: 0
PALPITATIONS: 0
VOMITING: 1
HEMOPTYSIS: 0

## 2018-06-13 ASSESSMENT — LIFESTYLE VARIABLES: DO YOU DRINK ALCOHOL: NO

## 2018-06-13 NOTE — ED NOTES
Vicyk from Lab called with critical result of CO2 8,  at 0829. Critical lab result read back to Vicky.   Dr. Yusuf notified of critical lab result at 0830.  Critical lab result read back by Dr. Yusuf.

## 2018-06-13 NOTE — ED PROVIDER NOTES
ED Provider Note    CHIEF COMPLAINT  Chief Complaint   Patient presents with   • High Blood Sugar     FSBS 412   • Vomiting     x 1 day   • Abdominal Pain     mid upper abd pain        HPI  Alis Sparks is a 27 y.o. female who p ambulates to the emergency department with her mother complaining nausea, vomiting abdominal pain.  Patient with history of type 1 diabetes, recently discharged after evaluation for vomiting, abdominal pain and DKA.  Patient states she had improvement in her symptoms for 1 day after discharge but since yesterday she has had recurrent intractable nausea, vomiting and epigastric abdominal pain.  No hematemesis.  No diarrhea.  Chills without fever.  Blood glucose spiked again this morning despite medication compliance.    REVIEW OF SYSTEMS  See HPI for further details. All other systems are negative.     PAST MEDICAL HISTORY   has a past medical history of Backpain; Bronchitis; Diabetes type 1, controlled (Formerly [facility]); DKA (diabetic ketoacidoses) (Formerly [facility]); Fall; Gastroparesis; Kidney infection; Pneumonia; and UTI (urinary tract infection).    SOCIAL HISTORY  Social History     Social History Main Topics   • Smoking status: Never Smoker   • Smokeless tobacco: Never Used   • Alcohol use No   • Drug use: No   • Sexual activity: No       SURGICAL HISTORY   has a past surgical history that includes gastroscopy-endo (9/18/2014); gastroscopy with biopsy (9/18/2014); other; submandible abscess incision and drainage (Left, 11/8/2017); dental extraction(s) (11/8/2017); and gastroscopy-endo (N/A, 6/9/2018).    CURRENT MEDICATIONS  Home Medications     Reviewed by Gosia Hairston R.N. (Registered Nurse) on 06/13/18 at 0457  Med List Status: Complete   Medication Last Dose Status   atorvastatin (LIPITOR) 20 MG Tab 6/12/2018 Active   Cholecalciferol 2000 UNIT Cap 6/12/2018 Active   ferrous sulfate 325 (65 Fe) MG tablet 6/12/2018 Active   gabapentin (NEURONTIN) 100 MG Cap 6/13/2018 Active   insulin  "glargine (LANTUS) 100 UNIT/ML Solution 6/12/2018 Active   insulin glulisine (APIDRA) 100 UNIT/ML Solution 6/12/2018 Active   insulin regular (HUMULIN R) 100 Unit/mL Solution 6/12/2018 Active   metoclopramide (REGLAN) 10 MG/10ML Solution 6/13/2018 Active   omeprazole (PRILOSEC) 20 MG delayed-release capsule 6/12/2018 Active   sucralfate (CARAFATE) 1 GM/10ML Suspension 6/13/2018 Active                ALLERGIES  Allergies   Allergen Reactions   • Pcn [Penicillins] Shortness of Breath and Swelling     Per patient's Mom, patient tolerates Keflex   • Lisinopril Unspecified     Per historical: Reported pedal swelling. No facial/angioedema or rash nor respiratory symptoms.    • Promethazine Vomiting       PHYSICAL EXAM  VITAL SIGNS: /94   Pulse (!) 132   Temp 36.7 °C (98 °F)   Resp 18   Ht 1.651 m (5' 5\")   Wt 81.1 kg (178 lb 12.7 oz)   LMP 05/29/2018 (Exact Date)   SpO2 99%   BMI 29.75 kg/m²   Pulse ox interpretation: I interpret this pulse ox as normal.  Constitutional: Alert in no apparent distress.  HENT: Normocephalic, atraumatic. Bilateral external ears normal, Nose normal. Dry mucous membranes.    Eyes: Pupils are equal and reactive, Conjunctiva normal.   Neck: Normal range of motion, Supple  Lymphatic: No lymphadenopathy noted.   Cardiovascular: Tachycardic otherwise regular rate and rhythm, no murmurs. Distal pulses intact.  No peripheral edema.  Thorax & Lungs: Normal breath sounds.  No wheezing/rales/ronchi. No increased work of breathing  Abdomen: Soft, non-distended.  Tender to palpation across the upper abdomen without rebound, guarding or peritonitis.  No CVA tenderness to percussion.  Skin: Warm, Dry, No erythema, No rash.   Musculoskeletal: Good range of motion in all major joints.   Neurologic: Alert and oriented ×4.  Speech clear and cohesive.  Ambulates independently without ataxia.  Psychiatric: Affect normal, Judgment normal, Mood normal.       DIAGNOSTIC STUDIES / " PROCEDURES    LABS  Results for orders placed or performed during the hospital encounter of 06/13/18   CBC w/ Differential   Result Value Ref Range    WBC 9.9 4.8 - 10.8 K/uL    RBC 4.52 4.20 - 5.40 M/uL    Hemoglobin 13.3 12.0 - 16.0 g/dL    Hematocrit 39.1 37.0 - 47.0 %    MCV 86.5 81.4 - 97.8 fL    MCH 29.4 27.0 - 33.0 pg    MCHC 34.0 33.6 - 35.0 g/dL    RDW 39.4 35.9 - 50.0 fL    Platelet Count 227 164 - 446 K/uL    MPV 10.3 9.0 - 12.9 fL    Neutrophils-Polys 67.40 44.00 - 72.00 %    Lymphocytes 21.70 (L) 22.00 - 41.00 %    Monocytes 8.00 0.00 - 13.40 %    Eosinophils 1.60 0.00 - 6.90 %    Basophils 0.40 0.00 - 1.80 %    Immature Granulocytes 0.90 0.00 - 0.90 %    Nucleated RBC 0.00 /100 WBC    Neutrophils (Absolute) 6.65 2.00 - 7.15 K/uL    Lymphs (Absolute) 2.14 1.00 - 4.80 K/uL    Monos (Absolute) 0.79 0.00 - 0.85 K/uL    Eos (Absolute) 0.16 0.00 - 0.51 K/uL    Baso (Absolute) 0.04 0.00 - 0.12 K/uL    Immature Granulocytes (abs) 0.09 0.00 - 0.11 K/uL    NRBC (Absolute) 0.00 K/uL   Complete Metabolic Panel (CMP)   Result Value Ref Range    Sodium 132 (L) 135 - 145 mmol/L    Potassium 4.9 3.6 - 5.5 mmol/L    Chloride 99 96 - 112 mmol/L    Co2 12 (L) 20 - 33 mmol/L    Anion Gap 21.0 (H) 0.0 - 11.9    Glucose 533 (HH) 65 - 99 mg/dL    Bun 13 8 - 22 mg/dL    Creatinine 0.70 0.50 - 1.40 mg/dL    Calcium 9.0 8.5 - 10.5 mg/dL    AST(SGOT) 12 12 - 45 U/L    ALT(SGPT) 17 2 - 50 U/L    Alkaline Phosphatase 105 (H) 30 - 99 U/L    Total Bilirubin 0.7 0.1 - 1.5 mg/dL    Albumin 4.0 3.2 - 4.9 g/dL    Total Protein 6.8 6.0 - 8.2 g/dL    Globulin 2.8 1.9 - 3.5 g/dL    A-G Ratio 1.4 g/dL   MAGNESIUM   Result Value Ref Range    Magnesium 1.6 1.5 - 2.5 mg/dL   PHOSPHORUS   Result Value Ref Range    Phosphorus 3.8 2.5 - 4.5 mg/dL   BETA-HYDROXYBUTYRIC ACID   Result Value Ref Range    beta-Hydroxybutyric Acid 6.97 (H) 0.02 - 0.27 mmol/L   LIPASE   Result Value Ref Range    Lipase 16 11 - 82 U/L   VENOUS BLOOD GAS   Result Value  Ref Range    Venous Bg Ph 7.28 (L) 7.31 - 7.45    Venous Bg Pco2 28.9 (L) 41.0 - 51.0 mmHg    Venous Bg Po2 36.4 25.0 - 40.0 mmHg    Venous Bg O2 Saturation 64.1 %    Venous Bg Hco3 13 (L) 24 - 28 mmol/L    Venous Bg Base Excess -12 mmol/L    Body Temp see below Centigrade   HCG QUAL SERUM   Result Value Ref Range    Beta-Hcg Qualitative Serum Negative Negative   ESTIMATED GFR   Result Value Ref Range    GFR If African American >60 >60 mL/min/1.73 m 2    GFR If Non African American >60 >60 mL/min/1.73 m 2   ACCU-CHEK GLUCOSE   Result Value Ref Range    Glucose - Accu-Ck 412 (HH) 65 - 99 mg/dL       COURSE & MEDICAL DECISION MAKING  Nursing notes and vital signs were reviewed. (See chart for details)  The patients records were reviewed, history was obtained from the patient;     Medical record review: Discharge summary from 6/11/18 after evaluation and treatment for gastroparesis, DKA.  Recurrent nausea vomiting abdominal pain, patient underwent EGD (Dr. Lee) revealing esophagitis, gastritis, pyloric stenosis with subsequent balloon dilation.  Recommended omeprazole and sucralfate.  HIDA scan was unremarkable.    ED evaluation once again demonstrates DKA, pH 7.28, bicarb 13, glucose 533.  Beta hydroxybutyrate 6.97.  Sodium 132, potassium 4.9, chloride 99.  Renal function is preserved.  No leukocytosis, anemia.  Vital signs are stable without fever or hypotension, tachycardia is appropriate in this setting and improving with IV fluids.  Abdominal exam with tenderness across the upper abdomen, history of the same, no peritonitis or acute abdomen.  No clinical evidence for sepsis.  No further vomiting with Reglan.  Pain improved with fentanyl.  IV fluid continues per protocol for hyperglycemia/DKA.  Insulin drip has been initiated.  Patient will be admitted to the hospitalist for further evaluation and treatment.  Patient is aware the findings and agreeable to the disposition plan.    7:23 AM Dr. Guerra is aware of the  patient and agreeable to admission.    The total critical care time on this patient is 40 minutes, continues hemodynamic monitoring multiple bedside evaluations, resuscitating patient and assess her response to treatment, speaking with admitting physician, and deciphering test results, and arranging for ICU admission. This 40 minutes is exclusive of separately billable procedures.    FINAL IMPRESSION  (E10.10) Diabetic ketoacidosis without coma associated with type 1 diabetes mellitus (HCC)  (primary encounter diagnosis)  (R11.2) Intractable vomiting with nausea, unspecified vomiting type  (K31.84) Gastroparesis      Electronically signed by: Tila Yusuf, 6/13/2018 5:31 AM      This dictation was created using voice recognition software. The accuracy of the dictation is limited to the abilities of the software. I expect there may be some errors of grammar and possibly content. The nursing notes were reviewed and certain aspects of this information were incorporated into this note.

## 2018-06-13 NOTE — ED TRIAGE NOTES
"Alis Munguia [place]  Chief Complaint   Patient presents with   • High Blood Sugar     FSBS 412   • Vomiting     x 1 day   • Abdominal Pain     mid upper abd pain      Pt ambulatory to triage with above complaint. Pt states she was recently discharge on Monday from a 9 day admit d/t DKA. Pt states she was told to return to ED if symptoms return. Pt states since Tuesday she has been unable to keep food or liquid down and having severe pain in mid upper abd. Accu-check completed in triage, FSBS 412.    /94   Pulse (!) 130   Temp 36.7 °C (98 °F)   Resp 18   Ht 1.651 m (5' 5\")   Wt 81.1 kg (178 lb 12.7 oz)   LMP 05/29/2018 (Exact Date)   SpO2 97%   BMI 29.75 kg/m²     Charge RN made aware of pt, pt to [facility] now.  "

## 2018-06-13 NOTE — ASSESSMENT & PLAN NOTE
And gastritis as well as pyloric stenosis seen on EGD done on 6/9/18.  Continue Carafate and changed to by mouth Prilosec twice a day  GI signed off

## 2018-06-13 NOTE — ED NOTES
Pt lying in stretcher, c/o abdominal pain despite receiving IV fentanyl. States nausea has improved. Pt to be admitted to ICU, updated on POC. Acknowledged.     [place] fall assessment completed. No fall risk based off of assessment. Bed locked in low position, call light in place. Personal possessions in place.

## 2018-06-13 NOTE — ED NOTES
Pt presenting for the above complaints.  Pt reporting abd pain, emesis since last night.  Pt is diabetic, reports taking her medications at home.  Pt BG at home 286, on arrival here after giving herself sliding scale insulin, BG over 400.

## 2018-06-13 NOTE — ED NOTES
[place] fall assessment completed. Pt is not high risk for fall at this time.  Bed locked in low position, call light in place. Personal possessions in place.  Personal needs assessed. Safety assessed. Will monitor frequently

## 2018-06-13 NOTE — H&P
Hospital Medicine History and Physical    Date of Service  6/13/2018    Chief Complaint  Chief Complaint   Patient presents with   • High Blood Sugar     FSBS 412   • Vomiting     x 1 day   • Abdominal Pain     mid upper abd pain        History of Presenting Illness  27 y.o. female with past medical history of poorly controlled type I diabetes mellitus complicated with gastroparesis and multiple admissions for DKA with the most recent one was this month came in with intractable nausea and vomiting and abdominal pain. Patient stated that she's been compliant with her medications specifically insulin. However, she was unable to take her oral medications. She had recently had EGD done on 6/9/18 which showed erosive esophagitis and gastritis as well as pyloric stenosis. She had dilatation done. She was discharged on PPI and Carafate which she was unable to take due to her intractable nausea and vomiting. Denies any fever but admits to chills. Denies any burning with urination. When arrived was found to be in DKA with blood sugar above 500 mg/dL with high anion gap and positive ketone bodies. Her UA was negative for UTI. ABG showed metabolic acidosis. Patient was admitted to intensive care unit for her high acuity.   Primary Care Physician  Navi Huber M.D.    Consultants  None    Code Status  Full    Review of Systems  Review of Systems   Constitutional: Positive for chills, diaphoresis and malaise/fatigue. Negative for fever.   HENT: Negative for hearing loss and tinnitus.    Eyes: Negative for blurred vision and double vision.   Respiratory: Negative for cough and hemoptysis.    Cardiovascular: Negative for chest pain and palpitations.   Gastrointestinal: Positive for abdominal pain, nausea and vomiting. Negative for blood in stool, diarrhea, heartburn and melena.   Genitourinary: Negative for dysuria and urgency.   Musculoskeletal: Negative for back pain and neck pain.   Skin: Negative for rash.    Neurological: Negative for dizziness and headaches.   Endo/Heme/Allergies: Does not bruise/bleed easily.   Psychiatric/Behavioral: Negative for depression and suicidal ideas.        Past Medical History  Past Medical History:   Diagnosis Date   • Backpain    • Bronchitis    • Diabetes type 1, controlled (Formerly [facility])     tests 4-5 times daily   • DKA (diabetic ketoacidoses) ([facility])    • Fall    • Gastroparesis    • Kidney infection    • Pneumonia    • UTI (urinary tract infection)        Surgical History  Past Surgical History:   Procedure Laterality Date   • GASTROSCOPY-ENDO N/A 6/9/2018    Procedure: GASTROSCOPY-ENDO WITH BIOPSIES AND DIALATION;  Surgeon: Marino Black M.D.;  Location: SURGERY [facility];  Service: Gastroenterology   • SUBMANDIBLE ABSCESS INCISION AND DRAINAGE Left 11/8/2017    Procedure: SUBMANDIBLE ABSCESS INCISION AND DRAINAGE;  Surgeon: Osman Young M.D.;  Location: SURGERY [facility];  Service: Dental   • DENTAL EXTRACTION(S)  11/8/2017    Procedure: DENTAL EXTRACTION(S);  Surgeon: Osman Young M.D.;  Location: SURGERY [facility];  Service: Dental   • GASTROSCOPY-ENDO  9/18/2014    Performed by Sylvain PERRY M.D. at ENDOSCOPY [facility]   • GASTROSCOPY WITH BIOPSY  9/18/2014    Performed by Sylvain PERRY M.D. at ENDOSCOPY [facility]   • OTHER      surgery to patch lung after pneumor from central line placement       Medications  No current facility-administered medications on file prior to encounter.      Current Outpatient Prescriptions on File Prior to Encounter   Medication Sig Dispense Refill   • gabapentin (NEURONTIN) 100 MG Cap Take 1 Cap by mouth 2 Times a Day. 60 Cap 1   • insulin glargine (LANTUS) 100 UNIT/ML Solution Inject 40 Units as instructed every evening. 10 mL 2   • sucralfate (CARAFATE) 1 GM/10ML Suspension Take 10 mL by mouth every 6 hours. 500 mL 1   • insulin regular (HUMULIN R) 100 Unit/mL Solution Inject 2-9 Units as instructed 4 Times a  Day,Before Meals and at Bedtime. Per sliding scale as instructed. 10 mL 5   • omeprazole (PRILOSEC) 20 MG delayed-release capsule Take 1 Cap by mouth every day. 30 Cap 1   • metoclopramide (REGLAN) 10 MG/10ML Solution Take 10 mg by mouth 3 times a day before meals.     • insulin glulisine (APIDRA) 100 UNIT/ML Solution Take per home sliding scale three times daily before meals (Patient taking differently: Inject 20-30 Units as instructed 3 times a day before meals. Take per home sliding scale three times daily before meals) 1 Vial 10   • ferrous sulfate 325 (65 Fe) MG tablet Take 325 mg by mouth every day.     • Cholecalciferol 2000 UNIT Cap Take 1 Cap by mouth every day.     • atorvastatin (LIPITOR) 20 MG Tab Take 1 Tab by mouth every day. 30 Tab 0       Family History  Family History   Problem Relation Age of Onset   • Cancer       breasts, multiple family members   • Hypertension Maternal Grandfather        Social History  Social History   Substance Use Topics   • Smoking status: Never Smoker   • Smokeless tobacco: Never Used   • Alcohol use No       Allergies  Allergies   Allergen Reactions   • Pcn [Penicillins] Shortness of Breath and Swelling     Per patient's Mom, patient tolerates Keflex   • Lisinopril Unspecified     Per historical: Reported pedal swelling. No facial/angioedema or rash nor respiratory symptoms.    • Promethazine Vomiting        Physical Exam  Laboratory   Hemodynamics  Temp (24hrs), Av.7 °C (98 °F), Min:36.7 °C (98 °F), Max:36.7 °C (98 °F)   Temperature: 36.7 °C (98 °F)  Pulse  Av.4  Min: 115  Max: 136 Heart Rate (Monitored): (!) 136  Blood Pressure: 140/94, NIBP: 121/74      Respiratory      Respiration: 18, Pulse Oximetry: 99 %             Physical Exam   Constitutional: She is oriented to person, place, and time. She appears well-developed. She appears distressed.   HENT:   Head: Normocephalic and atraumatic.   Eyes: Pupils are equal, round, and reactive to light. Left eye  exhibits no discharge. No scleral icterus.   Neck: Normal range of motion. Neck supple. No JVD present. No thyromegaly present.   Cardiovascular: Regular rhythm.  Tachycardia present.  Exam reveals no friction rub.    Pulmonary/Chest: Effort normal. No respiratory distress.   Abdominal: Soft. She exhibits no distension. There is tenderness (at the epigastric area). There is no rebound.   Musculoskeletal: She exhibits no edema or deformity.   Neurological: She is alert and oriented to person, place, and time.   Skin: Skin is dry. No erythema.   Psychiatric: She has a normal mood and affect. Her behavior is normal.       Recent Labs      06/13/18   0600   WBC  9.9   RBC  4.52   HEMOGLOBIN  13.3   HEMATOCRIT  39.1   MCV  86.5   MCH  29.4   MCHC  34.0   RDW  39.4   PLATELETCT  227   MPV  10.3     Recent Labs      06/11/18   0340  06/13/18   0600   SODIUM  137  132*   POTASSIUM  3.6  4.9   CHLORIDE  103  99   CO2  26  12*   GLUCOSE  167*  533*   BUN  5*  13   CREATININE  0.43*  0.70   CALCIUM  8.0*  9.0     Recent Labs      06/11/18   0340  06/13/18   0600   ALTSGPT  17  17   ASTSGOT  15  12   ALKPHOSPHAT  81  105*   TBILIRUBIN  0.5  0.7   LIPASE   --   16   GLUCOSE  167*  533*                 Lab Results   Component Value Date    TROPONINI <0.01 07/27/2017     Urinalysis:    Lab Results  Component Value Date/Time   SPECGRAVITY 1.032 04/01/2018 1103   SPECGRAVITY 1.032 04/01/2018 1103   GLUCOSEUR >=1000 (A) 04/01/2018 1103   KETONES >=160 04/01/2018 1103   NITRITE Negative 04/01/2018 1103   WBCURINE 20-50 (A) 04/01/2018 1103   RBCURINE 2-5 (A) 04/01/2018 1103   BACTERIA Moderate (A) 04/01/2018 1103   EPITHELCELL Few 04/01/2018 1103        Imaging  Reviewed.    Assessment/Plan     I anticipate this patient will require at least 2 midnight stay for reported medical management.    * Diabetic ketoacidosis (HCC)- (present on admission)   Assessment & Plan    Multiple admissions for DKA with recent one this month. Blood sugar  was over 500 mg/dl. High anion gap and positive ketone bodies. Continue with DKA protocol with IV insulin drip. Will be admitted to the intensive care unit.        Tachycardia- (present on admission)   Assessment & Plan    Secondary to above. On telemetry monitoring. Denies any chest pain.        Hyponatremia- (present on admission)   Assessment & Plan    What was secondary to dehydration and volume loss. On replacement. Continue to monitor.        AP (abdominal pain)- (present on admission)   Assessment & Plan    Secondary to above. As needed pain medications.        Intractable nausea and vomiting- (present on admission)   Assessment & Plan    Secondary to above. Continue with when necessary nausea medications.        Gastroparesis due to DM (HCC)- (present on admission)   Assessment & Plan    Continue metoclopramide. Small meal portions.        Esophagitis- (present on admission)   Assessment & Plan    And gastritis as well as pyloric stenosis seen on EGD done on 6/9/18. Continue Carafate and PPI.         Type 1 diabetes mellitus (HCC)- (present on admission)   Assessment & Plan    Poorly controlled. Hemoglobin A1c 12.51 4/8/18. Now with DKA. On insulin drip per DKA protocol. Will need diabetic education.        Pyloric stenosis   Assessment & Plan    Status post dilatation on 6/9.         Dehydration- (present on admission)   Assessment & Plan    Secondary to intractable nausea and vomiting. Continue with IV hydration.        Anemia- (present on admission)   Assessment & Plan    H&H stable. Continue supplements.        Patient is critically ill.   The patient continues to have: Intractable nausea and vomiting, tachycardia, and elevated blood sugar.  The vital organ system that is affected is the: Cardiovascular system, GI system, and metabolic disturbance.  If untreated there is a high chance of deterioration into: coma  And eventually death.   The critical care that I am providing today is: Aggressive  resuscitation with IV hydration along with starting insulin drip with very complicated system there has to be set up by me.  The critical that has been undertaken is medically complex.   There has been no overlap in critical care time.   Critical Care Time not including procedures: 35 minutes    VTE prophylaxis: SCDs

## 2018-06-13 NOTE — ED NOTES
Med rec complete per patient  Allergies reviewed    Patient is on a sliding scale of Apidra but only used 4 units at 0300 6-13-18 due to not eating anything

## 2018-06-13 NOTE — ED NOTES
Lab called with critical result of glucose 533 at 0707. Critical lab result read back to .   Dr. Yusuf notified of critical lab result at 0707.  Critical lab result read back by Dr. Yusuf.

## 2018-06-13 NOTE — ASSESSMENT & PLAN NOTE
Presumed gastroparesis flare  Appreciate GI consultation and recommendations  Patient has improved with scheduled Reglan  Benadryl or Ativan when necessary    Encourage compliance with recommended diet

## 2018-06-13 NOTE — CARE PLAN
Problem: Safety  Goal: Free from accidental injury  Outcome: PROGRESSING AS EXPECTED  Intervention:   Pt call light and personal belongings within reach, monitor on and patient’s cardiac rhythm being monitored.   Bed in low position, non skid socks on, bed alarm on.  Pt currently able to call out for assistance.   Pt near nursing station.    Problem: Knowledge Deficit  Goal:Patient/Significant other demonstrates understanding of disease process, treatment plan, medications and discharge instructions  Outcome: PROGRESSING AS EXPECTED  Interventions:  Plan of care discussed with pt.   Questions regarding diet and blood drawes addressed with pt.   Considered cultural and educational background when teaching care plan.   Questions on plan of care encouraged throughout the shift.

## 2018-06-14 LAB
ALBUMIN SERPL BCP-MCNC: 3 G/DL (ref 3.2–4.9)
ALBUMIN/GLOB SERPL: 1.4 G/DL
ALP SERPL-CCNC: 75 U/L (ref 30–99)
ALT SERPL-CCNC: 10 U/L (ref 2–50)
ANION GAP SERPL CALC-SCNC: 6 MMOL/L (ref 0–11.9)
ANION GAP SERPL CALC-SCNC: 8 MMOL/L (ref 0–11.9)
ANION GAP SERPL CALC-SCNC: 9 MMOL/L (ref 0–11.9)
AST SERPL-CCNC: 11 U/L (ref 12–45)
BASOPHILS # BLD AUTO: 0.5 % (ref 0–1.8)
BASOPHILS # BLD: 0.03 K/UL (ref 0–0.12)
BILIRUB SERPL-MCNC: 0.4 MG/DL (ref 0.1–1.5)
BUN SERPL-MCNC: 4 MG/DL (ref 8–22)
BUN SERPL-MCNC: 5 MG/DL (ref 8–22)
BUN SERPL-MCNC: 6 MG/DL (ref 8–22)
BUN SERPL-MCNC: 6 MG/DL (ref 8–22)
BUN SERPL-MCNC: <3 MG/DL (ref 8–22)
CALCIUM SERPL-MCNC: 7.8 MG/DL (ref 8.5–10.5)
CALCIUM SERPL-MCNC: 8 MG/DL (ref 8.5–10.5)
CALCIUM SERPL-MCNC: 8.1 MG/DL (ref 8.5–10.5)
CALCIUM SERPL-MCNC: 8.3 MG/DL (ref 8.5–10.5)
CALCIUM SERPL-MCNC: 8.3 MG/DL (ref 8.5–10.5)
CHLORIDE SERPL-SCNC: 108 MMOL/L (ref 96–112)
CHLORIDE SERPL-SCNC: 110 MMOL/L (ref 96–112)
CHLORIDE SERPL-SCNC: 110 MMOL/L (ref 96–112)
CHLORIDE SERPL-SCNC: 112 MMOL/L (ref 96–112)
CHLORIDE SERPL-SCNC: 112 MMOL/L (ref 96–112)
CO2 SERPL-SCNC: 16 MMOL/L (ref 20–33)
CO2 SERPL-SCNC: 18 MMOL/L (ref 20–33)
CO2 SERPL-SCNC: 19 MMOL/L (ref 20–33)
CREAT SERPL-MCNC: 0.4 MG/DL (ref 0.5–1.4)
CREAT SERPL-MCNC: 0.41 MG/DL (ref 0.5–1.4)
CREAT SERPL-MCNC: 0.45 MG/DL (ref 0.5–1.4)
CREAT SERPL-MCNC: 0.46 MG/DL (ref 0.5–1.4)
CREAT SERPL-MCNC: 0.53 MG/DL (ref 0.5–1.4)
EKG IMPRESSION: NORMAL
EOSINOPHIL # BLD AUTO: 0.21 K/UL (ref 0–0.51)
EOSINOPHIL NFR BLD: 3.3 % (ref 0–6.9)
ERYTHROCYTE [DISTWIDTH] IN BLOOD BY AUTOMATED COUNT: 42 FL (ref 35.9–50)
GLOBULIN SER CALC-MCNC: 2.2 G/DL (ref 1.9–3.5)
GLUCOSE BLD-MCNC: 105 MG/DL (ref 65–99)
GLUCOSE BLD-MCNC: 105 MG/DL (ref 65–99)
GLUCOSE BLD-MCNC: 107 MG/DL (ref 65–99)
GLUCOSE BLD-MCNC: 112 MG/DL (ref 65–99)
GLUCOSE BLD-MCNC: 116 MG/DL (ref 65–99)
GLUCOSE BLD-MCNC: 116 MG/DL (ref 65–99)
GLUCOSE BLD-MCNC: 118 MG/DL (ref 65–99)
GLUCOSE BLD-MCNC: 119 MG/DL (ref 65–99)
GLUCOSE BLD-MCNC: 119 MG/DL (ref 65–99)
GLUCOSE BLD-MCNC: 120 MG/DL (ref 65–99)
GLUCOSE BLD-MCNC: 120 MG/DL (ref 65–99)
GLUCOSE BLD-MCNC: 123 MG/DL (ref 65–99)
GLUCOSE BLD-MCNC: 124 MG/DL (ref 65–99)
GLUCOSE BLD-MCNC: 127 MG/DL (ref 65–99)
GLUCOSE BLD-MCNC: 128 MG/DL (ref 65–99)
GLUCOSE BLD-MCNC: 130 MG/DL (ref 65–99)
GLUCOSE BLD-MCNC: 132 MG/DL (ref 65–99)
GLUCOSE BLD-MCNC: 134 MG/DL (ref 65–99)
GLUCOSE BLD-MCNC: 134 MG/DL (ref 65–99)
GLUCOSE BLD-MCNC: 135 MG/DL (ref 65–99)
GLUCOSE BLD-MCNC: 135 MG/DL (ref 65–99)
GLUCOSE BLD-MCNC: 138 MG/DL (ref 65–99)
GLUCOSE BLD-MCNC: 140 MG/DL (ref 65–99)
GLUCOSE BLD-MCNC: 142 MG/DL (ref 65–99)
GLUCOSE BLD-MCNC: 169 MG/DL (ref 65–99)
GLUCOSE BLD-MCNC: 94 MG/DL (ref 65–99)
GLUCOSE BLD-MCNC: 98 MG/DL (ref 65–99)
GLUCOSE SERPL-MCNC: 121 MG/DL (ref 65–99)
GLUCOSE SERPL-MCNC: 128 MG/DL (ref 65–99)
GLUCOSE SERPL-MCNC: 130 MG/DL (ref 65–99)
GLUCOSE SERPL-MCNC: 146 MG/DL (ref 65–99)
GLUCOSE SERPL-MCNC: 181 MG/DL (ref 65–99)
HCT VFR BLD AUTO: 34.7 % (ref 37–47)
HGB BLD-MCNC: 11.6 G/DL (ref 12–16)
IMM GRANULOCYTES # BLD AUTO: 0.06 K/UL (ref 0–0.11)
IMM GRANULOCYTES NFR BLD AUTO: 1 % (ref 0–0.9)
LIPASE SERPL-CCNC: 17 U/L (ref 11–82)
LYMPHOCYTES # BLD AUTO: 2.11 K/UL (ref 1–4.8)
LYMPHOCYTES NFR BLD: 33.5 % (ref 22–41)
MAGNESIUM SERPL-MCNC: 1.5 MG/DL (ref 1.5–2.5)
MAGNESIUM SERPL-MCNC: 1.6 MG/DL (ref 1.5–2.5)
MCH RBC QN AUTO: 29.4 PG (ref 27–33)
MCHC RBC AUTO-ENTMCNC: 33.4 G/DL (ref 33.6–35)
MCV RBC AUTO: 87.8 FL (ref 81.4–97.8)
MONOCYTES # BLD AUTO: 0.65 K/UL (ref 0–0.85)
MONOCYTES NFR BLD AUTO: 10.3 % (ref 0–13.4)
NEUTROPHILS # BLD AUTO: 3.23 K/UL (ref 2–7.15)
NEUTROPHILS NFR BLD: 51.4 % (ref 44–72)
NRBC # BLD AUTO: 0 K/UL
NRBC BLD-RTO: 0 /100 WBC
PHOSPHATE SERPL-MCNC: 2.4 MG/DL (ref 2.5–4.5)
PHOSPHATE SERPL-MCNC: 2.5 MG/DL (ref 2.5–4.5)
PLATELET # BLD AUTO: 212 K/UL (ref 164–446)
PMV BLD AUTO: 10.1 FL (ref 9–12.9)
POTASSIUM SERPL-SCNC: 3.6 MMOL/L (ref 3.6–5.5)
POTASSIUM SERPL-SCNC: 3.8 MMOL/L (ref 3.6–5.5)
POTASSIUM SERPL-SCNC: 3.8 MMOL/L (ref 3.6–5.5)
POTASSIUM SERPL-SCNC: 3.9 MMOL/L (ref 3.6–5.5)
POTASSIUM SERPL-SCNC: 4 MMOL/L (ref 3.6–5.5)
PROT SERPL-MCNC: 5.2 G/DL (ref 6–8.2)
RBC # BLD AUTO: 3.95 M/UL (ref 4.2–5.4)
SODIUM SERPL-SCNC: 134 MMOL/L (ref 135–145)
SODIUM SERPL-SCNC: 135 MMOL/L (ref 135–145)
SODIUM SERPL-SCNC: 135 MMOL/L (ref 135–145)
SODIUM SERPL-SCNC: 136 MMOL/L (ref 135–145)
SODIUM SERPL-SCNC: 136 MMOL/L (ref 135–145)
WBC # BLD AUTO: 6.3 K/UL (ref 4.8–10.8)

## 2018-06-14 PROCEDURE — 700105 HCHG RX REV CODE 258: Performed by: FAMILY MEDICINE

## 2018-06-14 PROCEDURE — 83735 ASSAY OF MAGNESIUM: CPT | Mod: 91

## 2018-06-14 PROCEDURE — 85025 COMPLETE CBC W/AUTO DIFF WBC: CPT

## 2018-06-14 PROCEDURE — 700105 HCHG RX REV CODE 258: Performed by: INTERNAL MEDICINE

## 2018-06-14 PROCEDURE — 700111 HCHG RX REV CODE 636 W/ 250 OVERRIDE (IP): Performed by: FAMILY MEDICINE

## 2018-06-14 PROCEDURE — 80053 COMPREHEN METABOLIC PANEL: CPT

## 2018-06-14 PROCEDURE — 82962 GLUCOSE BLOOD TEST: CPT | Mod: 91

## 2018-06-14 PROCEDURE — 700102 HCHG RX REV CODE 250 W/ 637 OVERRIDE(OP): Performed by: FAMILY MEDICINE

## 2018-06-14 PROCEDURE — 700111 HCHG RX REV CODE 636 W/ 250 OVERRIDE (IP): Performed by: INTERNAL MEDICINE

## 2018-06-14 PROCEDURE — 770022 HCHG ROOM/CARE - ICU (200)

## 2018-06-14 PROCEDURE — 83690 ASSAY OF LIPASE: CPT

## 2018-06-14 PROCEDURE — 99233 SBSQ HOSP IP/OBS HIGH 50: CPT | Performed by: HOSPITALIST

## 2018-06-14 PROCEDURE — 700111 HCHG RX REV CODE 636 W/ 250 OVERRIDE (IP): Performed by: HOSPITALIST

## 2018-06-14 PROCEDURE — 99233 SBSQ HOSP IP/OBS HIGH 50: CPT | Performed by: INTERNAL MEDICINE

## 2018-06-14 PROCEDURE — 80048 BASIC METABOLIC PNL TOTAL CA: CPT

## 2018-06-14 PROCEDURE — 84100 ASSAY OF PHOSPHORUS: CPT

## 2018-06-14 RX ORDER — POTASSIUM CHLORIDE 7.45 MG/ML
10 INJECTION INTRAVENOUS ONCE
Status: DISCONTINUED | OUTPATIENT
Start: 2018-06-14 | End: 2018-06-14

## 2018-06-14 RX ORDER — POTASSIUM CHLORIDE 7.45 MG/ML
10 INJECTION INTRAVENOUS ONCE
Status: COMPLETED | OUTPATIENT
Start: 2018-06-14 | End: 2018-06-14

## 2018-06-14 RX ORDER — POTASSIUM CHLORIDE 7.45 MG/ML
10 INJECTION INTRAVENOUS
Status: COMPLETED | OUTPATIENT
Start: 2018-06-15 | End: 2018-06-15

## 2018-06-14 RX ORDER — POTASSIUM CHLORIDE 7.45 MG/ML
10 INJECTION INTRAVENOUS
Status: COMPLETED | OUTPATIENT
Start: 2018-06-14 | End: 2018-06-14

## 2018-06-14 RX ORDER — METOCLOPRAMIDE HYDROCHLORIDE 5 MG/ML
10 INJECTION INTRAMUSCULAR; INTRAVENOUS EVERY 6 HOURS
Status: DISCONTINUED | OUTPATIENT
Start: 2018-06-14 | End: 2018-06-19

## 2018-06-14 RX ORDER — POTASSIUM CHLORIDE 7.45 MG/ML
10 INJECTION INTRAVENOUS ONCE
Status: DISCONTINUED | OUTPATIENT
Start: 2018-06-15 | End: 2018-06-15

## 2018-06-14 RX ADMIN — MORPHINE SULFATE 4 MG: 4 INJECTION INTRAVENOUS at 12:02

## 2018-06-14 RX ADMIN — ONDANSETRON 4 MG: 2 INJECTION INTRAMUSCULAR; INTRAVENOUS at 12:02

## 2018-06-14 RX ADMIN — POTASSIUM CHLORIDE 10 MEQ: 7.46 INJECTION, SOLUTION INTRAVENOUS at 16:53

## 2018-06-14 RX ADMIN — MORPHINE SULFATE 4 MG: 4 INJECTION INTRAVENOUS at 17:36

## 2018-06-14 RX ADMIN — SODIUM CHLORIDE 2 UNITS/HR: 9 INJECTION, SOLUTION INTRAVENOUS at 04:46

## 2018-06-14 RX ADMIN — PROCHLORPERAZINE EDISYLATE 10 MG: 5 INJECTION INTRAMUSCULAR; INTRAVENOUS at 07:54

## 2018-06-14 RX ADMIN — POTASSIUM CHLORIDE 10 MEQ: 7.46 INJECTION, SOLUTION INTRAVENOUS at 07:59

## 2018-06-14 RX ADMIN — POTASSIUM CHLORIDE 10 MEQ: 7.46 INJECTION, SOLUTION INTRAVENOUS at 16:02

## 2018-06-14 RX ADMIN — METOCLOPRAMIDE 10 MG: 5 INJECTION, SOLUTION INTRAMUSCULAR; INTRAVENOUS at 17:36

## 2018-06-14 RX ADMIN — POTASSIUM CHLORIDE 10 MEQ: 7.46 INJECTION, SOLUTION INTRAVENOUS at 03:57

## 2018-06-14 RX ADMIN — DEXTROSE AND SODIUM CHLORIDE: 10; .45 INJECTION, SOLUTION INTRAVENOUS at 02:39

## 2018-06-14 RX ADMIN — POTASSIUM CHLORIDE 10 MEQ: 10 INJECTION, SOLUTION INTRAVENOUS at 00:04

## 2018-06-14 RX ADMIN — METOCLOPRAMIDE 10 MG: 5 INJECTION, SOLUTION INTRAMUSCULAR; INTRAVENOUS at 12:02

## 2018-06-14 RX ADMIN — DEXTROSE AND SODIUM CHLORIDE: 10; .45 INJECTION, SOLUTION INTRAVENOUS at 12:01

## 2018-06-14 RX ADMIN — DEXTROSE AND SODIUM CHLORIDE: 10; .45 INJECTION, SOLUTION INTRAVENOUS at 22:41

## 2018-06-14 RX ADMIN — POTASSIUM CHLORIDE 10 MEQ: 10 INJECTION, SOLUTION INTRAVENOUS at 02:37

## 2018-06-14 RX ADMIN — POTASSIUM CHLORIDE 10 MEQ: 10 INJECTION, SOLUTION INTRAVENOUS at 19:38

## 2018-06-14 RX ADMIN — POTASSIUM CHLORIDE 10 MEQ: 10 INJECTION, SOLUTION INTRAVENOUS at 20:30

## 2018-06-14 RX ADMIN — ENOXAPARIN SODIUM 40 MG: 100 INJECTION SUBCUTANEOUS at 19:37

## 2018-06-14 RX ADMIN — MORPHINE SULFATE 4 MG: 4 INJECTION INTRAVENOUS at 08:00

## 2018-06-14 RX ADMIN — ONDANSETRON 4 MG: 2 INJECTION INTRAMUSCULAR; INTRAVENOUS at 17:36

## 2018-06-14 RX ADMIN — POTASSIUM CHLORIDE 10 MEQ: 7.46 INJECTION, SOLUTION INTRAVENOUS at 09:45

## 2018-06-14 RX ADMIN — MORPHINE SULFATE 4 MG: 4 INJECTION INTRAVENOUS at 02:03

## 2018-06-14 RX ADMIN — ONDANSETRON 4 MG: 2 INJECTION INTRAMUSCULAR; INTRAVENOUS at 04:43

## 2018-06-14 ASSESSMENT — ENCOUNTER SYMPTOMS
EYE DISCHARGE: 0
EYE PAIN: 0
PALPITATIONS: 0
WEAKNESS: 0
BACK PAIN: 0
BLOOD IN STOOL: 0
SPEECH CHANGE: 0
ABDOMINAL PAIN: 1
FALLS: 0
FLANK PAIN: 0
ORTHOPNEA: 0
EYE REDNESS: 0
MEMORY LOSS: 0
NAUSEA: 1
HEADACHES: 0
DIARRHEA: 0
HALLUCINATIONS: 0
FEVER: 0
VOMITING: 1
SEIZURES: 0
COUGH: 0
SPUTUM PRODUCTION: 0
BLURRED VISION: 0
LOSS OF CONSCIOUSNESS: 0
NERVOUS/ANXIOUS: 0
FOCAL WEAKNESS: 0
STRIDOR: 0
SHORTNESS OF BREATH: 0
HEMOPTYSIS: 0
NECK PAIN: 0

## 2018-06-14 ASSESSMENT — LIFESTYLE VARIABLES
EVER_SMOKED: NEVER
SUBSTANCE_ABUSE: 0

## 2018-06-14 ASSESSMENT — PAIN SCALES - GENERAL
PAINLEVEL_OUTOF10: 5
PAINLEVEL_OUTOF10: 5
PAINLEVEL_OUTOF10: 2
PAINLEVEL_OUTOF10: 5
PAINLEVEL_OUTOF10: 6
PAINLEVEL_OUTOF10: 5
PAINLEVEL_OUTOF10: 6
PAINLEVEL_OUTOF10: 7
PAINLEVEL_OUTOF10: 7
PAINLEVEL_OUTOF10: 8
PAINLEVEL_OUTOF10: 8
PAINLEVEL_OUTOF10: 3

## 2018-06-14 NOTE — CARE PLAN
Problem: Communication  Goal: The ability to communicate needs accurately and effectively will improve  Encouraging communication between patient and nursing staff.     Problem: Fluid Volume:  Goal: Will maintain balanced intake and output    Intervention: Monitor, educate, and encourage compliance with therapeutic intake of liquids  Charting I&O q2h  Following DKA protocol to hydrate   Encouraging PO and managing nausea

## 2018-06-14 NOTE — CONSULTS
DATE OF SERVICE:  06/13/2018    CRITICAL CARE CONSULTATION    The patient is in room .    CHIEF COMPLAINT:  Nausea, vomiting, high blood sugar, and medical management   with diabetic ketoacidosis.    HISTORY OF PRESENT ILLNESS:  This is a 27-year-old female with a known   recurrent history of uncontrolled diabetes mellitus.  The patient was just   here on Monday and discharged recently.  She was noted to have a blood sugar   of over 400 and had been unable to keep any food down or fluids.  She states   that when she was here and discharged on Monday, at that time, she had a   central line and has had a scar with abrasion to her neck region from the line   removal.  No evidence that she had shingles, though the wound appears to be   somewhat vesicular in nature.  Nevertheless, the patient stated that she was   unable to take any oral medications and recently had an EGD done on   06/09/2018, which showed erosive esophagitis and gastritis as well as pyloric   stenosis.  She had a dilatation done as well.    The patient was discharged on PPI and Carafate, but she was unable to take.    She states that she is being evaluated with a new endocrinologist next week in   regards to possible insulin pump candidate.  She states that when she   arrived, she was having dysuria, polyuria, and still nausea, and vomiting.    She was placed on a diabetic ketoacidosis protocol with an insulin infusion.    She had Accu-Chek, it was over 500 with a high anion gap acidosis with ketones   bodies.  UA was negative for urine infection.    Since then, she is corrected fairly quickly and is on D10 at this point and   does have labs being drawn at 4:00 p.m. today.    Critical care was asked to assist in medical management.    ALLERGIES:  PENICILLIN, LISINOPRIL, AND PROMETHAZINE.    MEDICATIONS:  Neurontin, Lantus, Carafate, insulin, omeprazole, Reglan,   Apidra, ferrous sulfate, cholecalciferol, and Lipitor.    PAST MEDICAL HISTORY:   Includes recent diagnosed esophagitis and gastritis,   gastroparesis, kidney infection, pneumonia, recent DKA discharged on Monday   with a central line, back pain, and bronchitis.    PAST SURGICAL HISTORY:  Includes EGD with biopsies and dilatation by Dr. Black, 06/09/2018; mandibular abscess with I and D in the left, 11/2017   with dental extraction in 11/2017; and previous endoscopy in 2014 as well with   EGD by Dr. Perales.  She has also had a surgery to patch her lung after central   line placement and pneumothorax.  Unclear when this was performed.    SOCIAL HISTORY:  Never smoke, no alcohol or drug abuse.    FAMILY HISTORY:  Includes cancer with multiple family members with breast   cancer and a maternal grandfather with hypertension.    REVIEW OF SYSTEMS:  Patient's review of systems were reviewed and otherwise   negative except described above in HPI.    PHYSICAL EXAMINATION:  VITAL SIGNS:  She had a temperature of 98 degrees with a pulse of 136, blood   pressure 140/94, respirations were 18, and pulse ox 99%.  GENERAL:  She was in moderate distress in the ED.  Currently, she appears less   distressed, but does have emesis bag at bedside.  HEENT:  Pupils are equal and reactive to light.  There is no icterus.  The   nose shows no epistaxis.  Oropharynx was dry.  She had no obvious adenopathy.    She does have, over anterior right neck, areas of previous central line   placement with some abrasion with what almost appear to be vesicular lesions,   but were not pruritic to her.  She said it was from the tape that was covering   the central line.  She is normocephalic, atraumatic, otherwise.  HEART:  Tachycardic, prominent S1, S2.  No obvious murmur, rub, or gallop,   otherwise.  LUNGS:  Reveal breath sounds equal bilaterally.  No wheezing or rhonchi.  ABDOMEN:  Mild epigastric tenderness.  No rebound, no rigidity.  She does have   nausea as described above.  MUSCULOSKELETAL:  No edema or  deformities.  NEUROLOGIC:  She was alert and oriented to person, place, and time.  Her skin   appears dry.  No obvious erythema.  The patient's neurologic exam shows   otherwise without obvious sensory deficits, upper or lower.  She had no   evidence of stocking glove neuropathy.    LABORATORY DATA:  White cell count was 11.7, H and H 13.5 and 41.2 with   platelet count of 257.  Chemistry showed sodium 138, potassium 4.4, chloride   114, CO2 is 12 with a prior check of 8 and prior to that was 12, anion gap was   23, now down to 12, BUN of 9, and creatinine is 0.57.  Her phos is 2.3 and   mag of 2.1.  Lipase was 16.  She had a beta hydroxybutyric acid level 6.97.    IMAGING:  CT abdomen and pelvis performed on the 3rd was reviewed as well,   which showed a thickening of the distal esophageal wall that was felt to be   new and probably due to esophagitis and hepatomegaly was noted.  On   06/09/2018, she did have an abdominal ultrasound that showed intrahepatic IVC   that was patent.  Portal vein was normal.  Common bile duct was normal.  No   evidence of gallstones as well.  Chest x-ray performed on the 4th revealed no   significant pneumothorax following a right neck catheter placement.  ECG   performed on the 8th of this month was sinus rhythm with T-wave abnormalities   with low voltage in the precordial leads.  There is no EKG today.  Pregnancy   test was negative today.  UA today also reveal glucose greater than 1000 with   ketones greater than 160, leukocyte esterase and nitrites negative.    IMPRESSION:  1.  Acute severe diabetic ketoacidosis.  2.  Pseudohyponatremia from blood glucose of 533.  3.  Anion gap acidosis.  4.  Negative troponin level.  5.  Ketonuria.  6.  Tachycardia due to diabetic ketoacidosis.  7.  Poorly controlled type 1 diabetes with hemoglobin A1c of 12.51 on   04/08/2018.  8.  Pyloric stenosis, recently diagnosed by EGD.  9.  Recent central line abnormality with mild abrasion to the right  anterior   chest and right neck region.  10.  Severe dehydration.  11.  Intractable nausea and vomiting with esophagitis and gastritis.  12.  Gastroparesis.  13. Query pneumonia, right lower lobe.    PLAN:  1.  The patient is admitted to intensive care unit.  2.  Follow DKA protocol.  3.  Agree with follow up with endocrinologist for possible insulin pump   evaluation.  4.  Would continue with PPI as the patient does have significant esophageal   bleed.  5.  May have to consider all oral therapy for now due to her nausea and   vomiting.  6.  Follow BMP with protocol and electrolyte replacement with protocol.  7.  Conversion of D10 since she is correcting quickly to subcutaneous if her   anion gap and her bicarb is normal.  8.  Possible chest x-ray to rule out pneumonia since she has recently had EGD   and is in DKA again.  8.  Consider EKG to rule out other acute cardiac abnormalities since this has   not yet been performed.     Patient remains critically ill.    Critical care time so far is 35 minutes not including procedures, review of   other data, and discussion with nursing staff.       ____________________________________     Thom Seaman,     MSD / NTS    DD:  06/13/2018 16:13:47  DT:  06/13/2018 17:14:47    D#:  [id number]  Job#:  [id number]

## 2018-06-14 NOTE — PROGRESS NOTES
Pulmonary Critical Care Progress Note        Chief Complaint:  Nausea and intractable vomiting diabetic ketoacidosis    History of Present Illness: This is a 27-year-old female with a known   recurrent history of uncontrolled diabetes mellitus.  The patient was just   here on Monday and discharged recently.  She was noted to have a blood sugar   of over 400 and had been unable to keep any food down or fluids.  She states   that when she was here and discharged on Monday, at that time, she had a   central line and has had a scar with abrasion to her neck region from the line   removal.  No evidence that she had shingles, though the wound appears to be   somewhat vesicular in nature.  Nevertheless, the patient stated that she was   unable to take any oral medications and recently had an EGD done on   06/09/2018, which showed erosive esophagitis and gastritis as well as pyloric   stenosis.  She had a dilatation done as well.     The patient was discharged on PPI and Carafate, but she was unable to take.    She states that she is being evaluated with a new endocrinologist next week in   regards to possible insulin pump candidate.  She states that when she   arrived, she was having dysuria, polyuria, and still nausea, and vomiting.    She was placed on a diabetic ketoacidosis protocol with an insulin infusion.    She had Accu-Chek, it was over 500 with a high anion gap acidosis with ketones   bodies.  UA was negative for urine infection.     Since then, she is corrected fairly quickly and is on D10 at this point and   does have labs being drawn at 4:00 p.m. today.     Critical care was asked to assist in medical management.    ROS:  Respiratory: negative, Cardiac: negative, GI: positive vomiting.  All other systems negative.    Interval Events:  24 hour interval history reviewed    Was just here Monday for DKA  Admitted for DKA again yesterday    Room air and using IS  Diabetic diet  Emesis q 1 hour  Not able to  take meds  2 peripheral IVs  d10 ns and insulin infusion due to correction of glucose  Pain with mso4  Reglan 4x per day    PFSH:  No change.    Respiratory:     Pulse Oximetry: 96 %  Chest Tube Drains:          Exam: unlabored respirations, no intercostal retractions or accessory muscle use  ImagingNone - Reviewed         Invalid input(s): [id number]    HemoDynamics:  Pulse: 94, Heart Rate (Monitored): 92  NIBP: 100/63       Exam: regular rate and rhythm  Imaging: Not reviewed        Neuro:  GCS         Exam: no focal deficits noted  Imaging: None - Reviewed    Fluids:  Intake/Output       06/12/18 0700 - 06/13/18 0659 (Not Admitted) 06/13/18 0700 - 06/14/18 0659 06/14/18 0700 - 06/15/18 0659      [id number] 8354-6474 Total 5271-8736 0297-9747 Total 9915-0737 3992-0872 Total       Intake    P.O.  --  -- --  120  -- 120  --  -- --    P.O. -- -- -- 120 -- 120 -- -- --    I.V.  --  -- --  918.4  1248 2166.4  --  -- --    Magnesium Sulfate Volume -- -- -- 50 -- 50 -- -- --    Insulin Volume -- -- -- 102.4 -- 102.4 -- -- --    D10%0.45%NS -- -- -- 166 1248 1414 -- -- --    D5%0.45%NS -- -- -- 600 -- 600 -- -- --    Total Intake -- -- -- 1038.4 1248 2286.4 -- -- --       Output    Urine  --  -- --  1000  1000 2000  --  -- --    Number of Times Voided -- -- -- 1 x 1 x 2 x -- -- --    Urine Void (mL) (non-catheter) -- -- -- 1000 1000 2000 -- -- --    Emesis  --  -- --  1150  500 1650  --  -- --    Emesis -- -- -- 3264 689 4222 -- -- --    Stool  --  -- --  --  -- --  --  -- --    Number of Times Stooled -- -- -- -- 0 x 0 x -- -- --    Total Output -- -- -- 2150 1500 3650 -- -- --       Net I/O     -- -- -- -1111.6 -384 -1363.6 -- -- --        Weight: 77.9 kg (171 lb 11.8 oz)  Recent Labs      06/13/18   1225  06/13/18   1640  06/13/18   2130  06/14/18   0200  06/14/18   0600   SODIUM  138  138  135  136  134*   POTASSIUM  4.4  4.0  3.9  3.8  3.6   CHLORIDE  114*  113*  110  112  112   CO2  12*  14*  16*  18*  16*   BUN   9  7*  7*  6*  6*   CREATININE  0.57  0.52  0.49*  0.46*  0.45*   MAGNESIUM  2.1  1.9   --    --   1.6   PHOSPHORUS  2.3*  2.4*   --    --   2.5   CALCIUM  7.9*  8.1*  8.0*  8.1*  8.0*       GI/Nutrition:  Exam: abdomen is soft and non-tender, however patient does have gastroparesis and has had emesis nearly hourly.  Imaging: None - Reviewed, however EGD was reviewed on yesterday's consultation. Please see note for further details  taking PO, but not tolerating. Put on a diabetic clear diet but only sips of fluids recommended  Liver Function  Recent Labs      18   0600  18   0745   18   2130  18   0200  18   06   ALTSGPT  17  13   --    --    --   10   ASTSGOT  12  14   --    --    --   11*   ALKPHOSPHAT  105*  93   --    --    --   75   TBILIRUBIN  0.7  0.7   --    --    --   0.4   LIPASE  16   --    --    --    --    --    GLUCOSE  533*  510*   < >  118*  128*  181*    < > = values in this interval not displayed.       Heme:  Recent Labs      18   0618   0745  18   06   RBC  4.52  4.62  3.95*   HEMOGLOBIN  13.3  13.5  11.6*   HEMATOCRIT  39.1  41.2  34.7*   PLATELETCT  227  257  212       Infectious Disease:  Temp  Av.7 °C (98.1 °F)  Min: 36.7 °C (98 °F)  Max: 36.8 °C (98.3 °F)  Micro: no cultures pending  Recent Labs      18   0618   0745  18   06   WBC  9.9  11.7*  6.3   NEUTSPOLYS  67.40  87.60*  51.40   LYMPHOCYTES  21.70*  8.80*  33.50   MONOCYTES  8.00  2.70  10.30   EOSINOPHILS  1.60  0.90  3.30   BASOPHILS  0.40  0.00  0.50   ASTSGOT  12  14  11*   ALTSGPT  17  13  10   ALKPHOSPHAT  105*  93  75   TBILIRUBIN  0.7  0.7  0.4     Current Facility-Administered Medications   Medication Dose Frequency Provider Last Rate Last Dose   • potassium chloride (KCL) ivpb 10 mEq  10 mEq Once Michael Gayle M.D.       • potassium chloride (KCL) ivpb 10 mEq  10 mEq Once Michael Gayle M.D.       • dextrose 10% and 0.45%  NaCl infusion   Continuous Michael Gayle M.D. 100 mL/hr at 06/14/18 0612     • MD ALERT-PHARMACY TO CONSULT FOR DKA MONITORING 1 Each  1 Each PRRONALD Hurd M.D.       • potassium phosphates 30 mmol in  mL ivpb  30 mmol Once PRN Shara Hurd M.D.        Or   • sodium phosphate 30 mmol in 1/2  mL ivpb  30 mmol Once PRN Shara Hurd M.D.       • Adult DKA potassium(K+) replacement scale  1 Each Q4HRS Shara Hurd M.D.   1 Each at 06/14/18 0600   • NS infusion   Continuous Shara Hurd M.D.   Stopped at 06/13/18 0800   • D5 NS infusion   Continuous Shara Hurd M.D.   Stopped at 06/13/18 1532   • senna-docusate (PERICOLACE or SENOKOT S) 8.6-50 MG per tablet 2 Tab  2 Tab BID Shara Hurd M.D.   Stopped at 06/13/18 0900    And   • polyethylene glycol/lytes (MIRALAX) PACKET 1 Packet  1 Packet QDAY PRN Shara Hurd M.D.        And   • magnesium hydroxide (MILK OF MAGNESIA) suspension 30 mL  30 mL QDAY MARK Hurd M.D.        And   • bisacodyl (DULCOLAX) suppository 10 mg  10 mg QDAY PRRONALD Hurd M.D.       • labetalol (NORMODYNE,TRANDATE) injection 10 mg  10 mg Q4HRS PRRONALD Hurd M.D.       • ondansetron (ZOFRAN) syringe/vial injection 4 mg  4 mg Q4HRS MARK Hurd M.D.   4 mg at 06/14/18 0443   • ondansetron (ZOFRAN ODT) dispertab 4 mg  4 mg Q4HRS PRRONALD Hurd M.D.       • promethazine (PHENERGAN) tablet 12.5-25 mg  12.5-25 mg Q4HRS PRN Shara Hurd M.D.       • promethazine (PHENERGAN) suppository 12.5-25 mg  12.5-25 mg Q4HRS MARK Hurd M.D.       • prochlorperazine (COMPAZINE) injection 5-10 mg  5-10 mg Q4HRS PRRONALD Hurd M.D.   10 mg at 06/13/18 1827   • acetaminophen (TYLENOL) tablet 650 mg  650 mg Q6HRS PRRONALD Hurd M.D.       • atorvastatin (LIPITOR) tablet 20 mg  20 mg DAILY Shara uHrd M.D.   Stopped at 06/13/18 0900   • cholecalciferol (VITAMIN D3)  tablet 400 Units  400 Units DAILY Shara Hurd M.D.   Stopped at 06/13/18 0900   • ferrous sulfate tablet 325 mg  325 mg DAILY Shara Hurd M.D.   Stopped at 06/13/18 0900   • gabapentin (NEURONTIN) capsule 100 mg  100 mg BID Shara Hurd M.D.   Stopped at 06/13/18 0900   • metoclopramide (REGLAN) 5 MG/5ML solution 10 mg  10 mg TID AC Shara Hurd M.D.   10 mg at 06/13/18 1628   • sucralfate (CARAFATE) 1 GM/10ML suspension 1 g  1 g Q6HRS Shara Hurd M.D.   1 g at 06/13/18 1700   • omeprazole (PRILOSEC) capsule 40 mg  40 mg DAILY Shara Hurd M.D.   Stopped at 06/13/18 0900   • morphine (pf) 4 mg/ml injection 4 mg  4 mg Q4HRS PRN Shara Hurd M.D.   4 mg at 06/14/18 0203   • insulin regular human (HUMULIN/NOVOLIN R) 62.5 Units in  mL Infusion for DKA  4 Units/hr Continuous Shara Hurd M.D. 8 mL/hr at 06/14/18 0446 2 Units/hr at 06/14/18 0446     Last reviewed on 6/13/2018  8:14 AM by Blade Darden    Quality  Measures:  Labs reviewed and Medications reviewed  Pitt catheter: No Pitt          Ulcer prophylaxis: Yes      Assessment and plan:    1. Acute diabetic ketoacidosis    -Continue on insulin protocol as the patient continues to have nausea and vomiting  -Labs are improved and anion gap is closed however patient is not tolerating.  -Continue with D10 water with insulin infusion  -And follow DKA protocol    2. Severe gastroparesis    -Earlier this week patient also had similar esophagitis and gastritis based on EGD.   -Patient has known gastroparesis as well.  -Continue with all antinausea therapy as well as begin Reglan    3. Previous kidney infection    -No evidence of infection at this time    4. Previous central line placement earlier this week and abrasions on the skin that do not appear to be infected.    5. History of previous pneumothorax and I believe a chest tube placement after central line placement remotely.    6. Consideration for  insulin pump and new endocrinologist as planned.    7. Possible pyloric stenosis based on EGD performed last week    -May require GI reevaluation.      Discussed patient condition and risk of morbidity and/or mortality with multidisciplinary team    Thom Seaman D.O.

## 2018-06-14 NOTE — PROGRESS NOTES
[facility] Hospitalist Progress Note    Date of Service: 2018    Chief Complaint  27 y.o. female admitted 2018 with recurrent DKA    Interval Problem Update    BP stable, ST  On RA  Still with N/V  Unable to keep oral meds  On insulin drip 2 units/hour              Consultants/Specialty  Critical care    Disposition  ICU        Review of Systems   Constitutional: Positive for malaise/fatigue. Negative for fever.   HENT: Negative for congestion, ear discharge and nosebleeds.    Eyes: Negative for blurred vision, pain, discharge and redness.   Respiratory: Negative for cough, hemoptysis, sputum production, shortness of breath and stridor.    Cardiovascular: Negative for chest pain, palpitations, orthopnea and leg swelling.   Gastrointestinal: Positive for abdominal pain, nausea and vomiting. Negative for blood in stool, diarrhea and melena.   Genitourinary: Negative for dysuria, flank pain and hematuria.   Musculoskeletal: Negative for back pain, falls, joint pain and neck pain.   Skin: Negative.    Neurological: Negative for speech change, focal weakness, seizures, loss of consciousness, weakness and headaches.   Psychiatric/Behavioral: Negative for hallucinations, memory loss and substance abuse. The patient is not nervous/anxious.    All other systems reviewed and are negative.     Physical Exam  Laboratory/Imaging   Hemodynamics  Temp (24hrs), Av.7 °C (98.1 °F), Min:36.7 °C (98 °F), Max:36.8 °C (98.3 °F)   Temperature: 36.7 °C (98 °F)  Pulse  Av.8  Min: 89  Max: 140 Heart Rate (Monitored): (!) 105  NIBP: 107/70      Respiratory      Respiration: 18, Pulse Oximetry: 98 %             Fluids    Intake/Output Summary (Last 24 hours) at 18 0906  Last data filed at 18 0800   Gross per 24 hour   Intake           2598.4 ml   Output             4450 ml   Net          -1851.6 ml       Nutrition  Orders Placed This Encounter   Procedures   • Diet Order     Standing Status:   Standing     Number of  Occurrences:   1     Order Specific Question:   Diet:     Answer:   Diabetic [3]     Physical Exam   Constitutional: She is oriented to person, place, and time. She appears well-developed and well-nourished.   HENT:   Head: Normocephalic and atraumatic.   Right Ear: External ear normal.   Left Ear: External ear normal.   Mouth/Throat: No oropharyngeal exudate.   Eyes: Conjunctivae are normal. Right eye exhibits no discharge. Left eye exhibits no discharge. No scleral icterus.   Neck: Neck supple. No JVD present. No tracheal deviation present.   Cardiovascular: Normal rate and regular rhythm.  Exam reveals no gallop and no friction rub.    No murmur heard.  Pulmonary/Chest: Effort normal and breath sounds normal. No stridor. No respiratory distress. She has no wheezes. She has no rales. She exhibits no tenderness.   Abdominal: Soft. Bowel sounds are normal. She exhibits no distension and no mass. There is tenderness (Mild generalized). There is no rebound and no guarding.   Musculoskeletal: She exhibits no edema or tenderness.   Neurological: She is alert and oriented to person, place, and time. No cranial nerve deficit. She exhibits normal muscle tone.   Skin: Skin is warm and dry. She is not diaphoretic. No cyanosis. Nails show no clubbing.   Psychiatric: She has a normal mood and affect. Her behavior is normal. Thought content normal.   Nursing note and vitals reviewed.      Recent Labs      06/13/18   0600  06/13/18   0745  06/14/18   0600   WBC  9.9  11.7*  6.3   RBC  4.52  4.62  3.95*   HEMOGLOBIN  13.3  13.5  11.6*   HEMATOCRIT  39.1  41.2  34.7*   MCV  86.5  89.2  87.8   MCH  29.4  29.2  29.4   MCHC  34.0  32.8*  33.4*   RDW  39.4  41.9  42.0   PLATELETCT  227  257  212   MPV  10.3  11.0  10.1     Recent Labs      06/13/18   2130  06/14/18   0200  06/14/18   0600   SODIUM  135  136  134*   POTASSIUM  3.9  3.8  3.6   CHLORIDE  110  112  112   CO2  16*  18*  16*   GLUCOSE  118*  128*  181*   BUN  7*  6*  6*    CREATININE  0.49*  0.46*  0.45*   CALCIUM  8.0*  8.1*  8.0*                      Assessment/Plan     * Diabetic ketoacidosis (HCC)- (present on admission)   Assessment & Plan    Continue insulin drip  Monitor CBG's and chem panel and adjust insulin and fluids accordingly        Hyponatremia- (present on admission)   Assessment & Plan    Resolved with hydration        AP (abdominal pain)- (present on admission)   Assessment & Plan    Secondary to above. As needed pain medications.        Intractable nausea and vomiting- (present on admission)   Assessment & Plan    Will switch reglan  To IV          Gastroparesis due to DM (HCC)- (present on admission)   Assessment & Plan    Change  Metoclopramide to IV        Esophagitis- (present on admission)   Assessment & Plan    And gastritis as well as pyloric stenosis seen on EGD done on 6/9/18.  carafate and prilosec        Type 1 diabetes mellitus (HCC)- (present on admission)   Assessment & Plan    Uncontrolled with hyperglycemia HbA1c 12.5  Hba1c 12.5          Pyloric stenosis   Assessment & Plan    Status post dilatation on 6/9.   Continue prilosec, carfate had gastritis         Anemia- (present on admission)   Assessment & Plan    No signs of bleeding continue to monitor          Quality-Core Measures   Reviewed items::  Labs reviewed, Medications reviewed and Radiology images reviewed  Pitt catheter::  No Pitt  DVT prophylaxis pharmacological::  Enoxaparin (Lovenox)  Ulcer Prophylaxis::  Yes      Plan of care reviewed with patient and discussed with nursing staff and Dr Seaman

## 2018-06-14 NOTE — CARE PLAN
Problem: Safety  Goal: Will remain free from falls    Intervention: Assess risk factors for falls  Bed in the lowest position, call light within reach, frequent rounding      Problem: Bowel/Gastric:  Goal: Normal bowel function is maintained or improved    Intervention: Educate patient and significant other/support system about diet, fluid intake, medications and activity to promote bowel function  Medications administered prn for n/v

## 2018-06-14 NOTE — PROGRESS NOTES
Per DKA protocol informed MD of 4 stable FSBS.   Continue gtt and check chem panel in 4 hours from previous.

## 2018-06-15 ENCOUNTER — APPOINTMENT (OUTPATIENT)
Dept: RADIOLOGY | Facility: MEDICAL CENTER | Age: 28
DRG: 637 | End: 2018-06-15
Attending: HOSPITALIST
Payer: MEDICAID

## 2018-06-15 PROBLEM — E83.42 HYPOMAGNESEMIA: Status: ACTIVE | Noted: 2018-06-15

## 2018-06-15 PROBLEM — E87.1 HYPONATREMIA: Status: RESOLVED | Noted: 2017-09-20 | Resolved: 2018-06-15

## 2018-06-15 LAB
ANION GAP SERPL CALC-SCNC: 6 MMOL/L (ref 0–11.9)
ANION GAP SERPL CALC-SCNC: 7 MMOL/L (ref 0–11.9)
ANION GAP SERPL CALC-SCNC: 8 MMOL/L (ref 0–11.9)
BASOPHILS # BLD AUTO: 0.6 % (ref 0–1.8)
BASOPHILS # BLD: 0.03 K/UL (ref 0–0.12)
BUN SERPL-MCNC: <3 MG/DL (ref 8–22)
CALCIUM SERPL-MCNC: 8.1 MG/DL (ref 8.5–10.5)
CALCIUM SERPL-MCNC: 8.4 MG/DL (ref 8.5–10.5)
CALCIUM SERPL-MCNC: 8.5 MG/DL (ref 8.5–10.5)
CHLORIDE SERPL-SCNC: 107 MMOL/L (ref 96–112)
CHLORIDE SERPL-SCNC: 111 MMOL/L (ref 96–112)
CHLORIDE SERPL-SCNC: 113 MMOL/L (ref 96–112)
CO2 SERPL-SCNC: 19 MMOL/L (ref 20–33)
CO2 SERPL-SCNC: 20 MMOL/L (ref 20–33)
CO2 SERPL-SCNC: 20 MMOL/L (ref 20–33)
CREAT SERPL-MCNC: 0.46 MG/DL (ref 0.5–1.4)
CREAT SERPL-MCNC: 0.49 MG/DL (ref 0.5–1.4)
CREAT SERPL-MCNC: 0.5 MG/DL (ref 0.5–1.4)
EOSINOPHIL # BLD AUTO: 0.14 K/UL (ref 0–0.51)
EOSINOPHIL NFR BLD: 2.6 % (ref 0–6.9)
ERYTHROCYTE [DISTWIDTH] IN BLOOD BY AUTOMATED COUNT: 40.4 FL (ref 35.9–50)
GLUCOSE BLD-MCNC: 102 MG/DL (ref 65–99)
GLUCOSE BLD-MCNC: 104 MG/DL (ref 65–99)
GLUCOSE BLD-MCNC: 110 MG/DL (ref 65–99)
GLUCOSE BLD-MCNC: 112 MG/DL (ref 65–99)
GLUCOSE BLD-MCNC: 113 MG/DL (ref 65–99)
GLUCOSE BLD-MCNC: 115 MG/DL (ref 65–99)
GLUCOSE BLD-MCNC: 120 MG/DL (ref 65–99)
GLUCOSE BLD-MCNC: 120 MG/DL (ref 65–99)
GLUCOSE BLD-MCNC: 123 MG/DL (ref 65–99)
GLUCOSE BLD-MCNC: 125 MG/DL (ref 65–99)
GLUCOSE BLD-MCNC: 126 MG/DL (ref 65–99)
GLUCOSE BLD-MCNC: 139 MG/DL (ref 65–99)
GLUCOSE BLD-MCNC: 142 MG/DL (ref 65–99)
GLUCOSE BLD-MCNC: 148 MG/DL (ref 65–99)
GLUCOSE BLD-MCNC: 174 MG/DL (ref 65–99)
GLUCOSE BLD-MCNC: 187 MG/DL (ref 65–99)
GLUCOSE SERPL-MCNC: 127 MG/DL (ref 65–99)
GLUCOSE SERPL-MCNC: 136 MG/DL (ref 65–99)
GLUCOSE SERPL-MCNC: 251 MG/DL (ref 65–99)
HCT VFR BLD AUTO: 35.3 % (ref 37–47)
HGB BLD-MCNC: 12 G/DL (ref 12–16)
IMM GRANULOCYTES # BLD AUTO: 0.03 K/UL (ref 0–0.11)
IMM GRANULOCYTES NFR BLD AUTO: 0.6 % (ref 0–0.9)
LYMPHOCYTES # BLD AUTO: 1.97 K/UL (ref 1–4.8)
LYMPHOCYTES NFR BLD: 37 % (ref 22–41)
MAGNESIUM SERPL-MCNC: 1.4 MG/DL (ref 1.5–2.5)
MCH RBC QN AUTO: 29.3 PG (ref 27–33)
MCHC RBC AUTO-ENTMCNC: 34 G/DL (ref 33.6–35)
MCV RBC AUTO: 86.1 FL (ref 81.4–97.8)
MONOCYTES # BLD AUTO: 0.5 K/UL (ref 0–0.85)
MONOCYTES NFR BLD AUTO: 9.4 % (ref 0–13.4)
NEUTROPHILS # BLD AUTO: 2.65 K/UL (ref 2–7.15)
NEUTROPHILS NFR BLD: 49.8 % (ref 44–72)
NRBC # BLD AUTO: 0 K/UL
NRBC BLD-RTO: 0 /100 WBC
PHOSPHATE SERPL-MCNC: 2.5 MG/DL (ref 2.5–4.5)
PLATELET # BLD AUTO: 225 K/UL (ref 164–446)
PMV BLD AUTO: 10.4 FL (ref 9–12.9)
POTASSIUM SERPL-SCNC: 3.7 MMOL/L (ref 3.6–5.5)
POTASSIUM SERPL-SCNC: 4 MMOL/L (ref 3.6–5.5)
POTASSIUM SERPL-SCNC: 4.5 MMOL/L (ref 3.6–5.5)
RBC # BLD AUTO: 4.1 M/UL (ref 4.2–5.4)
SODIUM SERPL-SCNC: 135 MMOL/L (ref 135–145)
SODIUM SERPL-SCNC: 137 MMOL/L (ref 135–145)
SODIUM SERPL-SCNC: 139 MMOL/L (ref 135–145)
WBC # BLD AUTO: 5.3 K/UL (ref 4.8–10.8)

## 2018-06-15 PROCEDURE — 80048 BASIC METABOLIC PNL TOTAL CA: CPT

## 2018-06-15 PROCEDURE — 700102 HCHG RX REV CODE 250 W/ 637 OVERRIDE(OP): Performed by: FAMILY MEDICINE

## 2018-06-15 PROCEDURE — 700105 HCHG RX REV CODE 258: Performed by: FAMILY MEDICINE

## 2018-06-15 PROCEDURE — 99233 SBSQ HOSP IP/OBS HIGH 50: CPT | Performed by: HOSPITALIST

## 2018-06-15 PROCEDURE — 84100 ASSAY OF PHOSPHORUS: CPT

## 2018-06-15 PROCEDURE — 700105 HCHG RX REV CODE 258: Performed by: INTERNAL MEDICINE

## 2018-06-15 PROCEDURE — 700105 HCHG RX REV CODE 258: Performed by: HOSPITALIST

## 2018-06-15 PROCEDURE — A9270 NON-COVERED ITEM OR SERVICE: HCPCS | Performed by: HOSPITALIST

## 2018-06-15 PROCEDURE — 700111 HCHG RX REV CODE 636 W/ 250 OVERRIDE (IP): Performed by: FAMILY MEDICINE

## 2018-06-15 PROCEDURE — 82962 GLUCOSE BLOOD TEST: CPT | Mod: 91

## 2018-06-15 PROCEDURE — 700102 HCHG RX REV CODE 250 W/ 637 OVERRIDE(OP): Performed by: HOSPITALIST

## 2018-06-15 PROCEDURE — 700111 HCHG RX REV CODE 636 W/ 250 OVERRIDE (IP): Performed by: INTERNAL MEDICINE

## 2018-06-15 PROCEDURE — 700102 HCHG RX REV CODE 250 W/ 637 OVERRIDE(OP): Performed by: INTERNAL MEDICINE

## 2018-06-15 PROCEDURE — 74018 RADEX ABDOMEN 1 VIEW: CPT

## 2018-06-15 PROCEDURE — 700101 HCHG RX REV CODE 250: Performed by: INTERNAL MEDICINE

## 2018-06-15 PROCEDURE — 83735 ASSAY OF MAGNESIUM: CPT

## 2018-06-15 PROCEDURE — C9113 INJ PANTOPRAZOLE SODIUM, VIA: HCPCS | Performed by: HOSPITALIST

## 2018-06-15 PROCEDURE — 700111 HCHG RX REV CODE 636 W/ 250 OVERRIDE (IP): Performed by: HOSPITALIST

## 2018-06-15 PROCEDURE — 770022 HCHG ROOM/CARE - ICU (200)

## 2018-06-15 PROCEDURE — 99233 SBSQ HOSP IP/OBS HIGH 50: CPT | Performed by: INTERNAL MEDICINE

## 2018-06-15 PROCEDURE — 85025 COMPLETE CBC W/AUTO DIFF WBC: CPT

## 2018-06-15 RX ORDER — SODIUM CHLORIDE, SODIUM LACTATE, POTASSIUM CHLORIDE, CALCIUM CHLORIDE 600; 310; 30; 20 MG/100ML; MG/100ML; MG/100ML; MG/100ML
INJECTION, SOLUTION INTRAVENOUS CONTINUOUS
Status: DISCONTINUED | OUTPATIENT
Start: 2018-06-15 | End: 2018-06-21

## 2018-06-15 RX ORDER — POTASSIUM CHLORIDE 7.45 MG/ML
10 INJECTION INTRAVENOUS
Status: DISCONTINUED | OUTPATIENT
Start: 2018-06-15 | End: 2018-06-15

## 2018-06-15 RX ORDER — DEXTROSE MONOHYDRATE 25 G/50ML
25 INJECTION, SOLUTION INTRAVENOUS
Status: DISCONTINUED | OUTPATIENT
Start: 2018-06-15 | End: 2018-06-29 | Stop reason: HOSPADM

## 2018-06-15 RX ORDER — INSULIN GLARGINE 100 [IU]/ML
20 INJECTION, SOLUTION SUBCUTANEOUS 2 TIMES DAILY
Status: DISCONTINUED | OUTPATIENT
Start: 2018-06-15 | End: 2018-06-16

## 2018-06-15 RX ORDER — POTASSIUM CHLORIDE 7.45 MG/ML
10 INJECTION INTRAVENOUS ONCE
Status: COMPLETED | OUTPATIENT
Start: 2018-06-15 | End: 2018-06-15

## 2018-06-15 RX ORDER — PANTOPRAZOLE SODIUM 40 MG/10ML
40 INJECTION, POWDER, LYOPHILIZED, FOR SOLUTION INTRAVENOUS 2 TIMES DAILY
Status: DISCONTINUED | OUTPATIENT
Start: 2018-06-15 | End: 2018-06-27

## 2018-06-15 RX ORDER — ERYTHROMYCIN 250 MG/1
250 TABLET, DELAYED RELEASE ORAL 3 TIMES DAILY
Status: DISPENSED | OUTPATIENT
Start: 2018-06-15 | End: 2018-06-18

## 2018-06-15 RX ORDER — POTASSIUM CHLORIDE 7.45 MG/ML
10 INJECTION INTRAVENOUS
Status: COMPLETED | OUTPATIENT
Start: 2018-06-15 | End: 2018-06-15

## 2018-06-15 RX ORDER — MAGNESIUM SULFATE HEPTAHYDRATE 40 MG/ML
4 INJECTION, SOLUTION INTRAVENOUS ONCE
Status: COMPLETED | OUTPATIENT
Start: 2018-06-15 | End: 2018-06-15

## 2018-06-15 RX ORDER — POTASSIUM CHLORIDE 7.45 MG/ML
10 INJECTION INTRAVENOUS ONCE
Status: DISCONTINUED | OUTPATIENT
Start: 2018-06-15 | End: 2018-06-15

## 2018-06-15 RX ADMIN — POTASSIUM CHLORIDE 10 MEQ: 10 INJECTION, SOLUTION INTRAVENOUS at 01:16

## 2018-06-15 RX ADMIN — METOCLOPRAMIDE 10 MG: 5 INJECTION, SOLUTION INTRAMUSCULAR; INTRAVENOUS at 00:16

## 2018-06-15 RX ADMIN — PROCHLORPERAZINE EDISYLATE 5 MG: 5 INJECTION INTRAMUSCULAR; INTRAVENOUS at 20:17

## 2018-06-15 RX ADMIN — METOCLOPRAMIDE 10 MG: 5 INJECTION, SOLUTION INTRAMUSCULAR; INTRAVENOUS at 11:02

## 2018-06-15 RX ADMIN — ONDANSETRON 4 MG: 2 INJECTION INTRAMUSCULAR; INTRAVENOUS at 00:12

## 2018-06-15 RX ADMIN — SODIUM CHLORIDE 2 UNITS/HR: 9 INJECTION, SOLUTION INTRAVENOUS at 04:55

## 2018-06-15 RX ADMIN — MORPHINE SULFATE 4 MG: 4 INJECTION INTRAVENOUS at 05:24

## 2018-06-15 RX ADMIN — ONDANSETRON 4 MG: 2 INJECTION INTRAMUSCULAR; INTRAVENOUS at 10:45

## 2018-06-15 RX ADMIN — POTASSIUM CHLORIDE 10 MEQ: 10 INJECTION, SOLUTION INTRAVENOUS at 16:45

## 2018-06-15 RX ADMIN — ONDANSETRON 4 MG: 2 INJECTION INTRAMUSCULAR; INTRAVENOUS at 16:46

## 2018-06-15 RX ADMIN — ONDANSETRON 4 MG: 2 INJECTION INTRAMUSCULAR; INTRAVENOUS at 20:49

## 2018-06-15 RX ADMIN — MORPHINE SULFATE 4 MG: 4 INJECTION INTRAVENOUS at 16:46

## 2018-06-15 RX ADMIN — ENOXAPARIN SODIUM 40 MG: 100 INJECTION SUBCUTANEOUS at 20:17

## 2018-06-15 RX ADMIN — POTASSIUM CHLORIDE 10 MEQ: 10 INJECTION, SOLUTION INTRAVENOUS at 00:14

## 2018-06-15 RX ADMIN — INSULIN GLARGINE 20 UNITS: 100 INJECTION, SOLUTION SUBCUTANEOUS at 17:47

## 2018-06-15 RX ADMIN — POTASSIUM CHLORIDE 10 MEQ: 10 INJECTION, SOLUTION INTRAVENOUS at 13:23

## 2018-06-15 RX ADMIN — METOCLOPRAMIDE 10 MG: 5 INJECTION, SOLUTION INTRAMUSCULAR; INTRAVENOUS at 17:38

## 2018-06-15 RX ADMIN — INSULIN HUMAN 10 UNITS: 100 INJECTION, SOLUTION PARENTERAL at 07:20

## 2018-06-15 RX ADMIN — METOCLOPRAMIDE 10 MG: 5 INJECTION, SOLUTION INTRAMUSCULAR; INTRAVENOUS at 05:24

## 2018-06-15 RX ADMIN — MORPHINE SULFATE 4 MG: 4 INJECTION INTRAVENOUS at 00:10

## 2018-06-15 RX ADMIN — POTASSIUM CHLORIDE 10 MEQ: 10 INJECTION, SOLUTION INTRAVENOUS at 14:10

## 2018-06-15 RX ADMIN — PANTOPRAZOLE SODIUM 40 MG: 40 INJECTION, POWDER, LYOPHILIZED, FOR SOLUTION INTRAVENOUS at 13:23

## 2018-06-15 RX ADMIN — PROCHLORPERAZINE EDISYLATE 10 MG: 5 INJECTION INTRAMUSCULAR; INTRAVENOUS at 02:49

## 2018-06-15 RX ADMIN — MORPHINE SULFATE 4 MG: 4 INJECTION INTRAVENOUS at 10:45

## 2018-06-15 RX ADMIN — MORPHINE SULFATE 4 MG: 4 INJECTION INTRAVENOUS at 20:49

## 2018-06-15 RX ADMIN — SODIUM CHLORIDE, POTASSIUM CHLORIDE, SODIUM LACTATE AND CALCIUM CHLORIDE: 600; 310; 30; 20 INJECTION, SOLUTION INTRAVENOUS at 20:15

## 2018-06-15 RX ADMIN — POTASSIUM PHOSPHATE, MONOBASIC AND POTASSIUM PHOSPHATE, DIBASIC 30 MMOL: 224; 236 INJECTION, SOLUTION INTRAVENOUS at 06:05

## 2018-06-15 RX ADMIN — METOCLOPRAMIDE 10 MG: 5 INJECTION, SOLUTION INTRAMUSCULAR; INTRAVENOUS at 23:42

## 2018-06-15 RX ADMIN — MAGNESIUM SULFATE IN WATER 4 G: 40 INJECTION, SOLUTION INTRAVENOUS at 06:34

## 2018-06-15 RX ADMIN — ONDANSETRON 4 MG: 2 INJECTION INTRAMUSCULAR; INTRAVENOUS at 05:24

## 2018-06-15 RX ADMIN — PANTOPRAZOLE SODIUM 40 MG: 40 INJECTION, POWDER, LYOPHILIZED, FOR SOLUTION INTRAVENOUS at 20:18

## 2018-06-15 RX ADMIN — PROCHLORPERAZINE EDISYLATE 5 MG: 5 INJECTION INTRAMUSCULAR; INTRAVENOUS at 13:22

## 2018-06-15 ASSESSMENT — ENCOUNTER SYMPTOMS
MEMORY LOSS: 0
EYE REDNESS: 0
VOMITING: 1
HEMOPTYSIS: 0
FOCAL WEAKNESS: 0
LOSS OF CONSCIOUSNESS: 0
BLURRED VISION: 0
HEADACHES: 0
FLANK PAIN: 0
DIARRHEA: 0
EYE DISCHARGE: 0
WEAKNESS: 0
EYE PAIN: 0
STRIDOR: 0
BLOOD IN STOOL: 0
SEIZURES: 0
NAUSEA: 1
BACK PAIN: 0
ABDOMINAL PAIN: 1
HALLUCINATIONS: 0
COUGH: 0
PALPITATIONS: 0
SPEECH CHANGE: 0
NECK PAIN: 0
TINGLING: 0
FEVER: 0

## 2018-06-15 ASSESSMENT — PAIN SCALES - GENERAL
PAINLEVEL_OUTOF10: 5
PAINLEVEL_OUTOF10: 0
PAINLEVEL_OUTOF10: 7
PAINLEVEL_OUTOF10: 0
PAINLEVEL_OUTOF10: 7
PAINLEVEL_OUTOF10: 0
PAINLEVEL_OUTOF10: 7
PAINLEVEL_OUTOF10: 8
PAINLEVEL_OUTOF10: 9
PAINLEVEL_OUTOF10: 8
PAINLEVEL_OUTOF10: 5
PAINLEVEL_OUTOF10: 5
PAINLEVEL_OUTOF10: 7
PAINLEVEL_OUTOF10: 0
PAINLEVEL_OUTOF10: 5

## 2018-06-15 ASSESSMENT — PATIENT HEALTH QUESTIONNAIRE - PHQ9
SUM OF ALL RESPONSES TO PHQ9 QUESTIONS 1 AND 2: 0
1. LITTLE INTEREST OR PLEASURE IN DOING THINGS: NOT AT ALL
2. FEELING DOWN, DEPRESSED, IRRITABLE, OR HOPELESS: NOT AT ALL

## 2018-06-15 ASSESSMENT — LIFESTYLE VARIABLES: SUBSTANCE_ABUSE: 0

## 2018-06-15 NOTE — PROGRESS NOTES
Pulmonary Critical Care Progress Note        Chief Complaint:  Nausea and intractable vomiting diabetic ketoacidosis    History of Present Illness: This is a 27-year-old female with a known   recurrent history of uncontrolled diabetes mellitus.  The patient was just   here on Monday and discharged recently.  She was noted to have a blood sugar   of over 400 and had been unable to keep any food down or fluids.  She states   that when she was here and discharged on Monday, at that time, she had a   central line and has had a scar with abrasion to her neck region from the line   removal.  No evidence that she had shingles, though the wound appears to be   somewhat vesicular in nature.  Nevertheless, the patient stated that she was   unable to take any oral medications and recently had an EGD done on   06/09/2018, which showed erosive esophagitis and gastritis as well as pyloric   stenosis.  She had a dilatation done as well.     The patient was discharged on PPI and Carafate, but she was unable to take.    She states that she is being evaluated with a new endocrinologist next week in   regards to possible insulin pump candidate.  She states that when she   arrived, she was having dysuria, polyuria, and still nausea, and vomiting.    She was placed on a diabetic ketoacidosis protocol with an insulin infusion.    She had Accu-Chek, it was over 500 with a high anion gap acidosis with ketones   bodies.  UA was negative for urine infection.     Since then, she is corrected fairly quickly and is on D10 at this point and   does have labs being drawn at 4:00 p.m. today.     Critical care was asked to assist in medical management.    ROS:  Respiratory: negative, Cardiac: negative, GI: positive vomiting.  All other systems negative.    Interval Events:  24 hour interval history reviewed    10 units given IV push earlier today  Still with emesis and concern about her pyloric stenosis as she is vomiting almost immediately after  eating any clear liquid.  Insulin gtt   Zofran and reglan, compazine  D/c phenergan  Neuro intact  Bedside commode  No IS b/c of nausea  Room air  Afebrile   D10 1/2 and insulin gtt at 1 u/hour  Compazine scheduled   GI consult regarding the possibility of pyloric stenosis with stricturing again  Protonix infusion    PFSH:  No change.    Respiratory:     Pulse Oximetry: 95 %  Chest Tube Drains:          Exam: unlabored respirations, no intercostal retractions or accessory muscle use  ImagingNone - Reviewed         Invalid input(s): [id number]    HemoDynamics:  Pulse: (!) 104, Heart Rate (Monitored): (!) 103  NIBP: (!) 99/68       Exam: regular rate and rhythm  Imaging: Not reviewed      Neuro:  GCS         Exam: no focal deficits noted  Imaging: None - Reviewed    Fluids:  Intake/Output       06/13/18 0700 - 06/14/18 0659 06/14/18 0700 - 06/15/18 0659 06/15/18 0700 - 06/16/18 0659      0497-5619 9226-4137 Total 4030-3368 1223-2343 Total 6082-4826 9607-6166 Total       Intake    P.O.  120  -- 120  0  -- 0  --  -- --    P.O. 120 -- 120 0 -- 0 -- -- --    I.V.  918.4  1248 2166.4  1408  1200 2608  --  -- --    Magnesium Sulfate Volume 50 -- 50 -- -- -- -- -- --    Insulin Volume 102.4 -- 102.4 208 -- 208 -- -- --    D10%0.45% 1248 1414 1000 1200 2200 -- -- --    D5%0.45% -- 600 200 -- 200 -- -- --    Total Intake 1038.4 1248 2286.4 1408 1200 2608 -- -- --       Output    Urine  1000  1000 2000  1000  1000 2000  --  -- --    Number of Times Voided 1 x 1 x 2 x 1 x 1 x 2 x -- -- --    Urine Void (mL) (non-catheter) 1000 1000 2000 1000 1000 2000 -- -- --    Emesis  1150  500 1650  300  402 702  --  -- --    Emesis 6640 077 4383 300 402 702 -- -- --    Stool  --  -- --  --  -- --  --  -- --    Number of Times Stooled -- 0 x 0 x -- 0 x 0 x -- -- --    Total Output 2150 1500 3650 1300 1402 2702 -- -- --       Net I/O     -1111.6 -252 -1363.6 108 -202 -94 -- -- --        Weight: 82.1 kg (181 lb)  Recent Labs       18   0600   18   1750  18   2240  06/15/18   0340   SODIUM  134*   < >  135  135  137   POTASSIUM  3.6   < >  4.0  3.8  3.7   CHLORIDE  112   < >  108  110  111   CO2  16*   < >  18*  19*  19*   BUN  6*   < >  4*  <3*  <3*   CREATININE  0.45*   < >  0.53  0.40*  0.50   MAGNESIUM  1.6   --    --   1.5  1.4*   PHOSPHORUS  2.5   --    --   2.4*  2.5   CALCIUM  8.0*   < >  8.3*  8.3*  8.5    < > = values in this interval not displayed.       GI/Nutrition:  Exam: abdomen is soft and non-tender, however patient does have gastroparesis and has had emesis nearly hourly.  Imaging: None - Reviewed, however EGD was reviewed on yesterday's consultation. Please see note for further details  taking PO, but not tolerating. Put on a diabetic clear diet but only sips of fluids recommended, and asked for nothing by mouth status now until reevaluation by gastroenterology as I believe she may still have pyloric stenosis with stricture  Liver Function  Recent Labs      18   0600  18   0745   18   0600  18   1200  18   1750  18   2240  06/15/18   0340   ALTSGPT  17  13   --   10   --    --    --    --    ASTSGOT  12  14   --   11*   --    --    --    --    ALKPHOSPHAT  105*  93   --   75   --    --    --    --    TBILIRUBIN  0.7  0.7   --   0.4   --    --    --    --    LIPASE  16   --    --    --   17   --    --    --    GLUCOSE  533*  510*   < >  181*  146*  130*  121*  136*    < > = values in this interval not displayed.       Heme:  Recent Labs      18   0745  18   0600  06/15/18   0500   RBC  4.62  3.95*  4.10*   HEMOGLOBIN  13.5  11.6*  12.0   HEMATOCRIT  41.2  34.7*  35.3*   PLATELETCT  257  212  225       Infectious Disease:  Temp  Av.7 °C (98.1 °F)  Min: 36.7 °C (98 °F)  Max: 36.9 °C (98.4 °F)  Micro: no cultures pending  Recent Labs      18   0600  18   0745  18   0600  06/15/18   0500   WBC  9.9  11.7*  6.3  5.3   NEUTSPOLYS  67.40   87.60*  51.40  49.80   LYMPHOCYTES  21.70*  8.80*  33.50  37.00   MONOCYTES  8.00  2.70  10.30  9.40   EOSINOPHILS  1.60  0.90  3.30  2.60   BASOPHILS  0.40  0.00  0.50  0.60   ASTSGOT  12  14  11*   --    ALTSGPT  17  13  10   --    ALKPHOSPHAT  105*  93  75   --    TBILIRUBIN  0.7  0.7  0.4   --      Current Facility-Administered Medications   Medication Dose Frequency Provider Last Rate Last Dose   • magnesium sulfate IVPB premix 4 g  4 g Once Michael Gayle M.D. 25 mL/hr at 06/15/18 0634 4 g at 06/15/18 0634   • potassium phosphates 30 mmol in D5W 500 mL ivpb  30 mmol Once Michael Gayle M.D. 125 mL/hr at 06/15/18 0605 30 mmol at 06/15/18 0605   • insulin regular (HUMULIN R) injection 10 Units  10 Units Once Thom Seaman D.O.       • metoclopramide (REGLAN) injection 10 mg  10 mg Q6HRS Pa Urbina M.D.   10 mg at 06/15/18 0524   • enoxaparin (LOVENOX) inj 40 mg  40 mg QHS Pa Urbina M.D.   40 mg at 06/14/18 1937   • dextrose 10% and 0.45% NaCl infusion   Continuous Michael Gayle M.D. 100 mL/hr at 06/14/18 2241     • MD ALERT-PHARMACY TO CONSULT FOR DKA MONITORING 1 Each  1 Each PRN Shara Hurd M.D.       • potassium phosphates 30 mmol in  mL ivpb  30 mmol Once PRN Shara Hurd M.D.        Or   • sodium phosphate 30 mmol in 1/2  mL ivpb  30 mmol Once PRN Shara Hurd M.D.       • Adult DKA potassium(K+) replacement scale  1 Each Q4HRS Shara Hurd M.D.   1 Each at 06/15/18 0430   • NS infusion   Continuous Shara Hurd M.D.   Stopped at 06/13/18 0800   • D5 NS infusion   Continuous Shara Hurd M.D.   Stopped at 06/13/18 1532   • senna-docusate (PERICOLACE or SENOKOT S) 8.6-50 MG per tablet 2 Tab  2 Tab BID Shara Hurd M.D.   Stopped at 06/13/18 0900    And   • polyethylene glycol/lytes (MIRALAX) PACKET 1 Packet  1 Packet QDAY PRN Shara Hurd M.D.        And   • magnesium hydroxide (MILK OF MAGNESIA)  suspension 30 mL  30 mL QDAY PRN Shara Hurd M.D.        And   • bisacodyl (DULCOLAX) suppository 10 mg  10 mg QDAY PRRONALD Hurd M.D.       • labetalol (NORMODYNE,TRANDATE) injection 10 mg  10 mg Q4HRS PRRONALD Hurd M.D.       • ondansetron (ZOFRAN) syringe/vial injection 4 mg  4 mg Q4HRS PRRONALD Hurd M.D.   4 mg at 06/15/18 0524   • ondansetron (ZOFRAN ODT) dispertab 4 mg  4 mg Q4HRS PRRONALD Hurd M.D.       • promethazine (PHENERGAN) tablet 12.5-25 mg  12.5-25 mg Q4HRS MARK Hurd M.D.       • promethazine (PHENERGAN) suppository 12.5-25 mg  12.5-25 mg Q4HRS PRRONALD Hurd M.D.       • prochlorperazine (COMPAZINE) injection 5-10 mg  5-10 mg Q4HRS PRRONALD Hurd M.D.   10 mg at 06/15/18 0249   • acetaminophen (TYLENOL) tablet 650 mg  650 mg Q6HRS PRRONALD Hurd M.D.       • atorvastatin (LIPITOR) tablet 20 mg  20 mg DAILY Shara Hurd M.D.   Stopped at 06/13/18 0900   • cholecalciferol (VITAMIN D3) tablet 400 Units  400 Units DAILY Shara Hurd M.D.   Stopped at 06/13/18 0900   • ferrous sulfate tablet 325 mg  325 mg DAILY Shara Hurd M.D.   Stopped at 06/13/18 0900   • gabapentin (NEURONTIN) capsule 100 mg  100 mg BID Shara Hurd M.D.   Stopped at 06/13/18 0900   • sucralfate (CARAFATE) 1 GM/10ML suspension 1 g  1 g Q6HRS Shara Hurd M.D.   Stopped at 06/14/18 1200   • omeprazole (PRILOSEC) capsule 40 mg  40 mg DAILY Shara Hurd M.D.   Stopped at 06/13/18 0900   • morphine (pf) 4 mg/ml injection 4 mg  4 mg Q4HRS PRN Shara Hurd M.D.   4 mg at 06/15/18 0524   • insulin regular human (HUMULIN/NOVOLIN R) 62.5 Units in  mL Infusion for DKA  4 Units/hr Continuous Shara Hurd M.D. 8 mL/hr at 06/15/18 0513 2 Units/hr at 06/15/18 0513     Last reviewed on 6/13/2018  8:14 AM by Blade Darden    Quality  Measures:   Labs reviewed and Medications reviewed   Pitt catheter: No  Pitt           Ulcer prophylaxis: Yes      Assessment and plan:    1. Acute diabetic ketoacidosis    -Continue on insulin protocol as the patient continues to have nausea and vomiting  -Labs are improved and anion gap is closed however patient is not tolerating.  -Continue with D10 water with insulin infusion  -And follow DKA protocol    2. Severe gastroparesis    -Earlier this week patient also had similar esophagitis and gastritis based on EGD.   -Patient has known gastroparesis as well.  -Continue with all antinausea therapy as well as begin Reglan    3. Previous kidney infection    -No evidence of infection at this time    4. Previous central line placement earlier this week and abrasions on the skin that do not appear to be infected.    5. History of previous pneumothorax and I believe a chest tube placement after central line placement remotely.    6. Consideration for insulin pump and new endocrinologist as planned.    7. Possible pyloric stenosis based on EGD performed last week    -Because of the intractable nausea vomiting, I believe she may have a stricture again with the pyloric stenosis and she continues to vomit frequently.  -Gastroenterology reevaluation requested.    Discussed patient condition and risk of morbidity and/or mortality with multidisciplinary team    Thom Seaman D.O.

## 2018-06-15 NOTE — PROGRESS NOTES
[facility] Hospitalist Progress Note    Date of Service: 6/15/2018    Chief Complaint  27 y.o. female admitted 2018 with recurrent DKA    Interval Problem Update      On insulin drip 2 units/hour  Still with N/V   SR                 Consultants/Specialty  Critical care    Disposition  ICU        Review of Systems   Constitutional: Positive for malaise/fatigue. Negative for fever.   HENT: Negative for congestion, ear discharge and nosebleeds.    Eyes: Negative for blurred vision, pain, discharge and redness.   Respiratory: Negative for cough, hemoptysis and stridor.    Cardiovascular: Negative for chest pain, palpitations and leg swelling.   Gastrointestinal: Positive for abdominal pain, nausea and vomiting. Negative for blood in stool, diarrhea and melena.   Genitourinary: Negative for dysuria, flank pain, hematuria and urgency.   Musculoskeletal: Negative for back pain and neck pain.   Skin: Negative.    Neurological: Negative for tingling, speech change, focal weakness, seizures, loss of consciousness, weakness and headaches.   Psychiatric/Behavioral: Negative for hallucinations, memory loss and substance abuse.   All other systems reviewed and are negative.     Physical Exam  Laboratory/Imaging   Hemodynamics  Temp (24hrs), Av.8 °C (98.2 °F), Min:36.7 °C (98 °F), Max:36.9 °C (98.4 °F)   Temperature: 36.8 °C (98.2 °F)  Pulse  Av.2  Min: 82  Max: 140 Heart Rate (Monitored): 97  NIBP: 115/71      Respiratory      Respiration: 15, Pulse Oximetry: 96 %        RUL Breath Sounds: Clear, RML Breath Sounds: Clear, RLL Breath Sounds: Diminished, NNACY Breath Sounds: Clear, LLL Breath Sounds: Diminished    Fluids    Intake/Output Summary (Last 24 hours) at 06/15/18 0910  Last data filed at 06/15/18 0800   Gross per 24 hour   Intake             2500 ml   Output             1902 ml   Net              598 ml       Nutrition  Orders Placed This Encounter   Procedures   • Diet Order     Standing Status:   Standing      Number of Occurrences:   1     Order Specific Question:   Diet:     Answer:   Diabetic [3]     Order Specific Question:   Diet:     Answer:   Clear Liquid [10]     Physical Exam   Constitutional: She is oriented to person, place, and time. She appears well-developed and well-nourished.   HENT:   Head: Normocephalic and atraumatic.   Mouth/Throat: Oropharynx is clear and moist. No oropharyngeal exudate.   Eyes: Conjunctivae are normal. Right eye exhibits no discharge. Left eye exhibits no discharge. No scleral icterus.   Neck: Neck supple. No JVD present. No tracheal deviation present.   Cardiovascular: Normal rate and regular rhythm.  Exam reveals no gallop and no friction rub.    No murmur heard.  Pulmonary/Chest: Effort normal and breath sounds normal. No stridor. No respiratory distress. She has no wheezes. She has no rales. She exhibits no tenderness.   Abdominal: Soft. Bowel sounds are normal. She exhibits no distension. There is tenderness (Mild generalized). There is no rebound.   Musculoskeletal: She exhibits no edema or tenderness.   Neurological: She is alert and oriented to person, place, and time. No cranial nerve deficit. She exhibits normal muscle tone.   Skin: Skin is warm and dry. She is not diaphoretic. No cyanosis or erythema. Nails show no clubbing.   Psychiatric: She has a normal mood and affect. Her behavior is normal. Judgment and thought content normal.   Nursing note and vitals reviewed.      Recent Labs      06/13/18   0745  06/14/18   0600  06/15/18   0500   WBC  11.7*  6.3  5.3   RBC  4.62  3.95*  4.10*   HEMOGLOBIN  13.5  11.6*  12.0   HEMATOCRIT  41.2  34.7*  35.3*   MCV  89.2  87.8  86.1   MCH  29.2  29.4  29.3   MCHC  32.8*  33.4*  34.0   RDW  41.9  42.0  40.4   PLATELETCT  257  212  225   MPV  11.0  10.1  10.4     Recent Labs      06/14/18   1750  06/14/18   2240  06/15/18   0340   SODIUM  135  135  137   POTASSIUM  4.0  3.8  3.7   CHLORIDE  108  110  111   CO2  18*  19*  19*    GLUCOSE  130*  121*  136*   BUN  4*  <3*  <3*   CREATININE  0.53  0.40*  0.50   CALCIUM  8.3*  8.3*  8.5                      Assessment/Plan     * Diabetic ketoacidosis (HCC)- (present on admission)   Assessment & Plan    Resolved we will transition to subcutaneous insulin        Intractable nausea and vomiting- (present on admission)   Assessment & Plan    Likely secondary to gastritis/esophagitis and gastroparesis  Continue scheduled Reglan we will add Compazine  IV Protonix continue Carafate  We will add erythromycin          Gastroparesis due to DM (HCC)- (present on admission)   Assessment & Plan    reglan, erythromycin        Esophagitis- (present on admission)   Assessment & Plan    And gastritis as well as pyloric stenosis seen on EGD done on 6/9/18.  carafate protonix        Type 1 diabetes mellitus (HCC)- (present on admission)   Assessment & Plan    Uncontrolled with hyperglycemia HbA1c 12.5  Hba1c 12.5    Transition to subcutaneous insulin with close monitoring          Hypomagnesemia   Assessment & Plan    Replete and monitor         Pyloric stenosis   Assessment & Plan    Status post dilatation on 6/9.   Given persistent, nausea and vomiting will switch to IV Protonix since she had gastritis/esophagitis on her recent EGD   I spoke with Dr. Lpoez from GI he does not feel that she needs to have repeat endoscopy or other interventions at this time he agrees with current management and recommended a trial off erythromycin 250 mg every 6 hours   Abdominal x-ray was negative        Anemia- (present on admission)   Assessment & Plan    Mild likely dilutional hemoglobin stable          Quality-Core Measures   Reviewed items::  Labs reviewed, Medications reviewed and Radiology images reviewed  Pitt catheter::  No Pitt  DVT prophylaxis pharmacological::  Enoxaparin (Lovenox)  Ulcer Prophylaxis::  Yes      Plan of care reviewed with patient and discussed with nursing staff and Dr Seaman

## 2018-06-15 NOTE — CARE PLAN
Problem: Safety  Goal: Will remain free from falls    Intervention: Assess risk factors for falls  Frequent rounding, call light within reach, bed in the lowest position       Problem: Bowel/Gastric:  Goal: Will not experience complications related to bowel motility    Intervention: Assess baseline bowel pattern  Prn medication for n/v, frequent rounding, bowel assessment Q4 hours

## 2018-06-15 NOTE — CARE PLAN
Problem: Nutritional:  Goal: Achieve adequate nutritional intake  Patient will advance diet past clears and consume >50% of meals  Outcome: NOT MET  See RD note

## 2018-06-15 NOTE — DIETARY
"Nutrition services: Day 2 of admit.  Alis Sparks is a 27 y.o. female with admitting DX of DKA  -Consult received for Poor PO noted on nutrition admit screen.     Current Problem List:  1. Uncontrolled Type 1 DM (A1C: 12.5), DKA  2. Esophagitis  3. Gastroparesis 2' DM  4. Intractable N/V  5. Abdominal Pain  6. Pyloric Stenosis  7. Erosive Esophagitis and Gastritis    Assessment:  Height: 165.1 cm (5' 5\"), Weight: 82.1 kg (181 lb)  Body mass index is 30.12 kg/m². (obese class I)  Diet/Intake: Diabatic, Clear Liquids    Evaluation:   1. Pt with severe nausea and emesis ~q2 hours per RN, RD assessment not appropriate at this time for PO intake interventions until N/V is alleviated and PO intake is tolerated.   2. Pertinent Meds: K+ replacement scale, SSI, Vit D3, Ferrous Sulfate, Mag Sulfate, Reglan, PRN Morphine/ Zofran.   3. Fluids: D5 NS @ 100 mL/hr, providing 408 kcals from dextrose per day. NS @ 125 mL/hr.    Recommendations/Plan:  1. Advance diet as tolerated to goal of Diabetic Diet  2. Encourage intake of all meals  3. Document intake of all meals as % taken in ADL's to provide interdisciplinary communication across all shifts.   4. Monitor weight.  5. Nutrition rep will continue to see patient for ongoing meal and snack preferences.       RD following    "

## 2018-06-15 NOTE — CARE PLAN
Problem: Safety  Goal: Will remain free from injury  Outcome: PROGRESSING SLOWER THAN EXPECTED  Provide assistance with mobility. Collaborate with pt and provide safe mobilization techniques.     Problem: Bowel/Gastric:  Goal: Normal bowel function is maintained or improved  Outcome: PROGRESSING SLOWER THAN EXPECTED  Educate pt and support system about diet, fluid intake, meds, activity to promote bowel function. Medications such as zofran, compazine and reglan as ordered.     Problem: Skin Integrity  Goal: Risk for impaired skin integrity will decrease  Outcome: PROGRESSING AS EXPECTED  Assess risk factors for impaired skin integrity and or pressure ulcers. Implement precautions to protect skin integrity. Assess and monitor skin appearance and temperature.

## 2018-06-16 LAB
ANION GAP SERPL CALC-SCNC: 8 MMOL/L (ref 0–11.9)
BASOPHILS # BLD AUTO: 0.6 % (ref 0–1.8)
BASOPHILS # BLD: 0.03 K/UL (ref 0–0.12)
BUN SERPL-MCNC: <3 MG/DL (ref 8–22)
CALCIUM SERPL-MCNC: 7.9 MG/DL (ref 8.5–10.5)
CHLORIDE SERPL-SCNC: 110 MMOL/L (ref 96–112)
CO2 SERPL-SCNC: 19 MMOL/L (ref 20–33)
CREAT SERPL-MCNC: 0.47 MG/DL (ref 0.5–1.4)
EOSINOPHIL # BLD AUTO: 0.15 K/UL (ref 0–0.51)
EOSINOPHIL NFR BLD: 2.9 % (ref 0–6.9)
ERYTHROCYTE [DISTWIDTH] IN BLOOD BY AUTOMATED COUNT: 41.3 FL (ref 35.9–50)
GLUCOSE BLD-MCNC: 111 MG/DL (ref 65–99)
GLUCOSE BLD-MCNC: 114 MG/DL (ref 65–99)
GLUCOSE BLD-MCNC: 120 MG/DL (ref 65–99)
GLUCOSE BLD-MCNC: 157 MG/DL (ref 65–99)
GLUCOSE BLD-MCNC: 256 MG/DL (ref 65–99)
GLUCOSE SERPL-MCNC: 208 MG/DL (ref 65–99)
HCT VFR BLD AUTO: 34.1 % (ref 37–47)
HGB BLD-MCNC: 11.7 G/DL (ref 12–16)
IMM GRANULOCYTES # BLD AUTO: 0.03 K/UL (ref 0–0.11)
IMM GRANULOCYTES NFR BLD AUTO: 0.6 % (ref 0–0.9)
LYMPHOCYTES # BLD AUTO: 1.73 K/UL (ref 1–4.8)
LYMPHOCYTES NFR BLD: 33.6 % (ref 22–41)
MCH RBC QN AUTO: 29.9 PG (ref 27–33)
MCHC RBC AUTO-ENTMCNC: 34.3 G/DL (ref 33.6–35)
MCV RBC AUTO: 87.2 FL (ref 81.4–97.8)
MONOCYTES # BLD AUTO: 0.5 K/UL (ref 0–0.85)
MONOCYTES NFR BLD AUTO: 9.7 % (ref 0–13.4)
NEUTROPHILS # BLD AUTO: 2.71 K/UL (ref 2–7.15)
NEUTROPHILS NFR BLD: 52.6 % (ref 44–72)
NRBC # BLD AUTO: 0 K/UL
NRBC BLD-RTO: 0 /100 WBC
PLATELET # BLD AUTO: 210 K/UL (ref 164–446)
PMV BLD AUTO: 10.7 FL (ref 9–12.9)
POTASSIUM SERPL-SCNC: 4.2 MMOL/L (ref 3.6–5.5)
RBC # BLD AUTO: 3.91 M/UL (ref 4.2–5.4)
SODIUM SERPL-SCNC: 137 MMOL/L (ref 135–145)
WBC # BLD AUTO: 5.2 K/UL (ref 4.8–10.8)

## 2018-06-16 PROCEDURE — 770001 HCHG ROOM/CARE - MED/SURG/GYN PRIV*

## 2018-06-16 PROCEDURE — 82962 GLUCOSE BLOOD TEST: CPT | Mod: 91

## 2018-06-16 PROCEDURE — 99232 SBSQ HOSP IP/OBS MODERATE 35: CPT | Performed by: HOSPITALIST

## 2018-06-16 PROCEDURE — 700102 HCHG RX REV CODE 250 W/ 637 OVERRIDE(OP): Performed by: HOSPITALIST

## 2018-06-16 PROCEDURE — 85025 COMPLETE CBC W/AUTO DIFF WBC: CPT

## 2018-06-16 PROCEDURE — C9113 INJ PANTOPRAZOLE SODIUM, VIA: HCPCS | Performed by: HOSPITALIST

## 2018-06-16 PROCEDURE — 99232 SBSQ HOSP IP/OBS MODERATE 35: CPT | Performed by: INTERNAL MEDICINE

## 2018-06-16 PROCEDURE — A9270 NON-COVERED ITEM OR SERVICE: HCPCS | Performed by: HOSPITALIST

## 2018-06-16 PROCEDURE — 700111 HCHG RX REV CODE 636 W/ 250 OVERRIDE (IP): Performed by: HOSPITALIST

## 2018-06-16 PROCEDURE — 700105 HCHG RX REV CODE 258: Performed by: HOSPITALIST

## 2018-06-16 PROCEDURE — 80048 BASIC METABOLIC PNL TOTAL CA: CPT

## 2018-06-16 PROCEDURE — 700111 HCHG RX REV CODE 636 W/ 250 OVERRIDE (IP): Performed by: FAMILY MEDICINE

## 2018-06-16 RX ORDER — INSULIN GLARGINE 100 [IU]/ML
23 INJECTION, SOLUTION SUBCUTANEOUS 2 TIMES DAILY
Status: DISCONTINUED | OUTPATIENT
Start: 2018-06-16 | End: 2018-06-21

## 2018-06-16 RX ADMIN — ONDANSETRON 4 MG: 2 INJECTION INTRAMUSCULAR; INTRAVENOUS at 11:24

## 2018-06-16 RX ADMIN — INSULIN HUMAN 5 UNITS: 100 INJECTION, SOLUTION PARENTERAL at 01:58

## 2018-06-16 RX ADMIN — ENOXAPARIN SODIUM 40 MG: 100 INJECTION SUBCUTANEOUS at 20:12

## 2018-06-16 RX ADMIN — MORPHINE SULFATE 4 MG: 4 INJECTION INTRAVENOUS at 01:47

## 2018-06-16 RX ADMIN — PROCHLORPERAZINE EDISYLATE 5 MG: 5 INJECTION INTRAMUSCULAR; INTRAVENOUS at 05:59

## 2018-06-16 RX ADMIN — MORPHINE SULFATE 4 MG: 4 INJECTION INTRAVENOUS at 17:36

## 2018-06-16 RX ADMIN — PROCHLORPERAZINE EDISYLATE 5 MG: 5 INJECTION INTRAMUSCULAR; INTRAVENOUS at 15:56

## 2018-06-16 RX ADMIN — INSULIN GLARGINE 23 UNITS: 100 INJECTION, SOLUTION SUBCUTANEOUS at 20:46

## 2018-06-16 RX ADMIN — METOCLOPRAMIDE 10 MG: 5 INJECTION, SOLUTION INTRAMUSCULAR; INTRAVENOUS at 05:59

## 2018-06-16 RX ADMIN — ONDANSETRON 4 MG: 2 INJECTION INTRAMUSCULAR; INTRAVENOUS at 01:47

## 2018-06-16 RX ADMIN — PANTOPRAZOLE SODIUM 40 MG: 40 INJECTION, POWDER, LYOPHILIZED, FOR SOLUTION INTRAVENOUS at 08:00

## 2018-06-16 RX ADMIN — SODIUM CHLORIDE, POTASSIUM CHLORIDE, SODIUM LACTATE AND CALCIUM CHLORIDE: 600; 310; 30; 20 INJECTION, SOLUTION INTRAVENOUS at 15:58

## 2018-06-16 RX ADMIN — MORPHINE SULFATE 4 MG: 4 INJECTION INTRAVENOUS at 06:04

## 2018-06-16 RX ADMIN — METOCLOPRAMIDE 10 MG: 5 INJECTION, SOLUTION INTRAMUSCULAR; INTRAVENOUS at 17:17

## 2018-06-16 RX ADMIN — SODIUM CHLORIDE, POTASSIUM CHLORIDE, SODIUM LACTATE AND CALCIUM CHLORIDE: 600; 310; 30; 20 INJECTION, SOLUTION INTRAVENOUS at 06:11

## 2018-06-16 RX ADMIN — INSULIN HUMAN 2 UNITS: 100 INJECTION, SOLUTION PARENTERAL at 06:46

## 2018-06-16 RX ADMIN — ERYTHROMYCIN 250 MG: 250 TABLET, DELAYED RELEASE ORAL at 14:32

## 2018-06-16 RX ADMIN — INSULIN GLARGINE 20 UNITS: 100 INJECTION, SOLUTION SUBCUTANEOUS at 08:00

## 2018-06-16 RX ADMIN — PANTOPRAZOLE SODIUM 40 MG: 40 INJECTION, POWDER, LYOPHILIZED, FOR SOLUTION INTRAVENOUS at 20:12

## 2018-06-16 RX ADMIN — ONDANSETRON 4 MG: 2 INJECTION INTRAMUSCULAR; INTRAVENOUS at 20:36

## 2018-06-16 RX ADMIN — MORPHINE SULFATE 4 MG: 4 INJECTION INTRAVENOUS at 11:24

## 2018-06-16 RX ADMIN — PROCHLORPERAZINE EDISYLATE 5 MG: 5 INJECTION INTRAMUSCULAR; INTRAVENOUS at 20:49

## 2018-06-16 RX ADMIN — METOCLOPRAMIDE 10 MG: 5 INJECTION, SOLUTION INTRAMUSCULAR; INTRAVENOUS at 11:00

## 2018-06-16 RX ADMIN — MORPHINE SULFATE 4 MG: 4 INJECTION INTRAVENOUS at 22:05

## 2018-06-16 ASSESSMENT — PAIN SCALES - GENERAL
PAINLEVEL_OUTOF10: 8
PAINLEVEL_OUTOF10: 0
PAINLEVEL_OUTOF10: 8
PAINLEVEL_OUTOF10: 0
PAINLEVEL_OUTOF10: 8
PAINLEVEL_OUTOF10: 8
PAINLEVEL_OUTOF10: 6
PAINLEVEL_OUTOF10: 0
PAINLEVEL_OUTOF10: 0
PAINLEVEL_OUTOF10: 8
PAINLEVEL_OUTOF10: 7
PAINLEVEL_OUTOF10: 7
PAINLEVEL_OUTOF10: 0
PAINLEVEL_OUTOF10: 5

## 2018-06-16 ASSESSMENT — ENCOUNTER SYMPTOMS
MEMORY LOSS: 0
BACK PAIN: 0
TINGLING: 0
ORTHOPNEA: 0
FLANK PAIN: 0
COUGH: 0
LOSS OF CONSCIOUSNESS: 0
EYE REDNESS: 0
NECK PAIN: 0
HALLUCINATIONS: 0
DIARRHEA: 0
HEMOPTYSIS: 0
CHILLS: 0
WEAKNESS: 0
EYE DISCHARGE: 0
HEADACHES: 0
VOMITING: 1
NAUSEA: 1
FOCAL WEAKNESS: 0
FEVER: 0
SPEECH CHANGE: 0
DIZZINESS: 0
NERVOUS/ANXIOUS: 1
SEIZURES: 0
SHORTNESS OF BREATH: 0
BLOOD IN STOOL: 0
PALPITATIONS: 0

## 2018-06-16 ASSESSMENT — LIFESTYLE VARIABLES: SUBSTANCE_ABUSE: 0

## 2018-06-16 NOTE — PROGRESS NOTES
Dr. Sweet paged regarding pt's midnight blood sugar. Okay to give insulin per active order at this time.   Physician also updated on pt's inability to tolerate PO including medications.

## 2018-06-16 NOTE — PROGRESS NOTES
Pulmonary Critical Care Progress Note        Chief Complaint:  Nausea and intractable vomiting diabetic ketoacidosis    History of Present Illness: This is a 27-year-old female with a known   recurrent history of uncontrolled diabetes mellitus.  The patient was just   here on Monday and discharged recently.  She was noted to have a blood sugar   of over 400 and had been unable to keep any food down or fluids.  She states   that when she was here and discharged on Monday, at that time, she had a   central line and has had a scar with abrasion to her neck region from the line   removal.  No evidence that she had shingles, though the wound appears to be   somewhat vesicular in nature.  Nevertheless, the patient stated that she was   unable to take any oral medications and recently had an EGD done on   06/09/2018, which showed erosive esophagitis and gastritis as well as pyloric   stenosis.  She had a dilatation done as well.     The patient was discharged on PPI and Carafate, but she was unable to take.    She states that she is being evaluated with a new endocrinologist next week in   regards to possible insulin pump candidate.  She states that when she   arrived, she was having dysuria, polyuria, and still nausea, and vomiting.    She was placed on a diabetic ketoacidosis protocol with an insulin infusion.    She had Accu-Chek, it was over 500 with a high anion gap acidosis with ketones   bodies.  UA was negative for urine infection.     Since then, she is corrected fairly quickly and is on D10 at this point and   does have labs being drawn at 4:00 p.m. today.     Critical care was asked to assist in medical management.    ROS:  Respiratory: negative, Cardiac: negative, GI: positive vomiting.  All other systems negative.    Interval Events:  24 hour interval history reviewed    Off insulin infusion and now to the subcutaneous insulin protocol.   Accu check 260 milligrams per deciliter  Refusing all oral  medications  Pyloric stenosis recently diagnosed earlier this month and rotation performed  Despite her acidosis improving, I'm concerned that she may have recurrent stricture formation regarding the dilatation.  May ask gastroenterology to reevaluate  SBP stable    HR  Hypoactive BS  KUB  Reglan on board   Erythromycin po, however the patient is refusing this as I believe she continues to have possible stenosis    PFSH:  No change.    Respiratory:     Pulse Oximetry: 94 %  Chest Tube Drains:          Exam: unlabored respirations, no intercostal retractions or accessory muscle use  ImagingNone - Reviewed         Invalid input(s): [id number]    HemoDynamics:  Pulse: (!) 109, Heart Rate (Monitored): (!) 113  NIBP: 118/79       Exam: regular rate and rhythm  Imaging: Not reviewed      Neuro:  GCS         Exam: no focal deficits noted. Appears fatigued however.  Imaging: None - Reviewed    Fluids:  Intake/Output       06/14/18 0700 - 06/15/18 0659 06/15/18 0700 - 06/16/18 0659 06/16/18 0700 - 06/17/18 0659      6211-7812 0019-3594 Total 7127-7353 2504-5489 Total 8017-2616 9261-4217 Total       Intake    P.O.  0  -- 0  0  140 140  --  -- --    P.O. 0 -- 0 0 140 140 -- -- --    I.V.  1408  1200 2608  1224  1204 2428  --  -- --    Insulin Volume 208 -- 208 24 4 28 -- -- --    D10%0.45%NS 1000 1200 2200 9096 592 7180 -- -- --    D5%0.45% -- 200 -- -- -- -- -- --    IV Volume (LR) -- -- -- -- 1000 1000 -- -- --    Total Intake 1408 1200 2608 1224 1344 2568 -- -- --       Output    Urine  1000  1000 2000  1200  800 2000  --  -- --    Number of Times Voided 1 x 1 x 2 x -- 1 x 1 x -- -- --    Urine Void (mL) (non-catheter) 1000 1000 2000 0360 028 5763 -- -- --    Emesis  300  402 702  475  600 1075  --  -- --    Emesis 300 402 702  -- -- --    Stool  --  -- --  --  -- --  --  -- --    Number of Times Stooled -- 0 x 0 x -- 0 x 0 x -- -- --    Total Output 1300 4194 6448 7903 5628 3855 -- -- --        Net I/O     108 -202 -94 -451 -56 -507 -- -- --           Recent Labs      06/14/18   0600   06/14/18   2240  06/15/18   0340  06/15/18   1100  06/15/18   1540   SODIUM  134*   < >  135  137  135  139   POTASSIUM  3.6   < >  3.8  3.7  4.0  4.5   CHLORIDE  112   < >  110  111  107  113*   CO2  16*   < >  19*  19*  20  20   BUN  6*   < >  <3*  <3*  <3*  <3*   CREATININE  0.45*   < >  0.40*  0.50  0.49*  0.46*   MAGNESIUM  1.6   --   1.5  1.4*   --    --    PHOSPHORUS  2.5   --   2.4*  2.5   --    --    CALCIUM  8.0*   < >  8.3*  8.5  8.4*  8.1*    < > = values in this interval not displayed.       GI/Nutrition:  Exam: abdomen is soft and non-tender, however patient does have gastroparesis and has had emesis at least 3 times overnight and had a bout of emesis this morning on my evaluation. I cannot palpate an obvious mass.  Imaging: None - Reviewed, however EGD was reviewed on yesterday's consultation. Please see note for further details  taking PO, but not tolerating. Put on a diabetic clear diet but only sips of fluids recommended, and asked for nothing by mouth status now until reevaluation by gastroenterology as I believe she may still have pyloric stenosis with stricture  Liver Function  Recent Labs      06/13/18   0745   06/14/18   0600 06/14/18   1200   06/15/18   0340  06/15/18   1100  06/15/18   1540   ALTSGPT  13   --   10   --    --    --    --    --    ASTSGOT  14   --   11*   --    --    --    --    --    ALKPHOSPHAT  93   --   75   --    --    --    --    --    TBILIRUBIN  0.7   --   0.4   --    --    --    --    --    LIPASE   --    --    --   17   --    --    --    --    GLUCOSE  510*   < >  181*  146*   < >  136*  251*  127*    < > = values in this interval not displayed.       Heme:  Recent Labs      06/14/18   0600  06/15/18   0500  06/16/18   0403   RBC  3.95*  4.10*  3.91*   HEMOGLOBIN  11.6*  12.0  11.7*   HEMATOCRIT  34.7*  35.3*  34.1*   PLATELETCT  212  225  210       Infectious Disease:  Temp   Av.7 °C (98.1 °F)  Min: 36.4 °C (97.5 °F)  Max: 36.9 °C (98.5 °F)  Micro: no cultures pending  Recent Labs      18   0745  18   0600  06/15/18   0500  18   0403   WBC  11.7*  6.3  5.3  5.2   NEUTSPOLYS  87.60*  51.40  49.80  52.60   LYMPHOCYTES  8.80*  33.50  37.00  33.60   MONOCYTES  2.70  10.30  9.40  9.70   EOSINOPHILS  0.90  3.30  2.60  2.90   BASOPHILS  0.00  0.50  0.60  0.60   ASTSGOT  14  11*   --    --    ALTSGPT  13  10   --    --    ALKPHOSPHAT  93  75   --    --    TBILIRUBIN  0.7  0.4   --    --      Current Facility-Administered Medications   Medication Dose Frequency Provider Last Rate Last Dose   • prochlorperazine (COMPAZINE) injection 5 mg  5 mg Q8HRS Pa Urbina M.D.   5 mg at 18 0559   • pantoprazole (PROTONIX) injection 40 mg  40 mg BID Pa Urbina M.D.   40 mg at 06/15/18 2018   • erythromycin base (UMU-TAB) tablet 250 mg  250 mg TID Pa Urbina M.D.       • insulin glargine (LANTUS) injection 20 Units  20 Units BID Pa Urbina M.D.   20 Units at 06/15/18 1747   • insulin regular (HUMULIN R) injection 2-9 Units  2-9 Units 4X/DAY ACHS Pa Urbina M.D.   2 Units at 18 0646    And   • glucose 4 g chewable tablet 16 g  16 g Q15 MIN PRN Pa Urbina M.D.        And   • dextrose 50% (D50W) injection 25 mL  25 mL Q15 MIN PRN Pa Urbina M.D.       • lactated ringers infusion   Continuous Pa Urbina M.D. 100 mL/hr at 06/16/18 0611     • metoclopramide (REGLAN) injection 10 mg  10 mg Q6HRS Pa Urbina M.D.   10 mg at 18 0559   • enoxaparin (LOVENOX) inj 40 mg  40 mg QHS Pa Urbina M.D.   40 mg at 06/15/18 2017   • senna-docusate (PERICOLACE or SENOKOT S) 8.6-50 MG per tablet 2 Tab  2 Tab BID Shara Hurd M.D.   Stopped at 18 0900    And   • polyethylene glycol/lytes (MIRALAX) PACKET 1 Packet  1 Packet QDAY PRN Shara Hurd M.D.        And   • magnesium  hydroxide (MILK OF MAGNESIA) suspension 30 mL  30 mL QDAY PRN Shara Hurd M.D.        And   • bisacodyl (DULCOLAX) suppository 10 mg  10 mg QDAY PRN Shara Hurd M.D.       • labetalol (NORMODYNE,TRANDATE) injection 10 mg  10 mg Q4HRS PRN Shara Hurd M.D.       • ondansetron (ZOFRAN) syringe/vial injection 4 mg  4 mg Q4HRS PRRONALD Hurd M.D.   4 mg at 06/16/18 0147   • ondansetron (ZOFRAN ODT) dispertab 4 mg  4 mg Q4HRS PRN Shara Hurd M.D.       • prochlorperazine (COMPAZINE) injection 5-10 mg  5-10 mg Q4HRS PRN Shara Hurd M.D.   10 mg at 06/15/18 0249   • acetaminophen (TYLENOL) tablet 650 mg  650 mg Q6HRS PRN Shara Hurd M.D.       • atorvastatin (LIPITOR) tablet 20 mg  20 mg DAILY Shara Hurd M.D.   Stopped at 06/13/18 0900   • cholecalciferol (VITAMIN D3) tablet 400 Units  400 Units DAILY Shara Hurd M.D.   Stopped at 06/13/18 0900   • ferrous sulfate tablet 325 mg  325 mg DAILY Shara Hurd M.D.   Stopped at 06/13/18 0900   • gabapentin (NEURONTIN) capsule 100 mg  100 mg BID Shara Hurd M.D.   Stopped at 06/13/18 0900   • sucralfate (CARAFATE) 1 GM/10ML suspension 1 g  1 g Q6HRS Shara Hurd M.D.   Stopped at 06/14/18 1200   • morphine (pf) 4 mg/ml injection 4 mg  4 mg Q4HRS PRN Shara Hurd M.D.   4 mg at 06/16/18 0604     Last reviewed on 6/13/2018  8:14 AM by Blade Darden    Quality  Measures:  Labs reviewed and Medications reviewed  Pitt catheter: No Pitt          Ulcer prophylaxis: Yes      Assessment and plan:    1. Acute diabetic ketoacidosis    -Continue on insulin protocol as the patient continues to have nausea and vomiting  -Labs are improved and anion gap is closed however patient is not tolerating.  -Continue with D10 water with insulin infusion  -And follow DKA protocol    2. Severe gastroparesis    -Earlier this week patient also had similar esophagitis and gastritis based on EGD.   -Patient  has known gastroparesis as well.  -Continue with all antinausea therapy as well as begin Reglan    3. Previous kidney infection    -No evidence of infection at this time    4. Previous central line placement earlier this week and abrasions on the skin that do not appear to be infected.    5. History of previous pneumothorax and I believe a chest tube placement after central line placement remotely.    6. Consideration for insulin pump and new endocrinologist as planned.    7. Possible pyloric stenosis based on EGD performed last week    -Because of the intractable nausea vomiting, I believe she may have a stricture again with the pyloric stenosis and she continues to vomit frequently.  -Gastroenterology reevaluation requested.  -We have discussed with gastroenterology the use of by mouth erythromycin which may help however I'm more concerned that the recurrent emesis may be due to stricture formation.  -If patient remains in the intensive care unit will ask for GI to consult.    Discussed patient condition and risk of morbidity and/or mortality with multidisciplinary team    Thom Seaman D.O.

## 2018-06-16 NOTE — CARE PLAN
Problem: Safety  Goal: Will remain free from falls    Intervention: Assess risk factors for falls  Room near nursing station, bed in lowest position, hourly rounding, call light within reach      Problem: Bowel/Gastric:  Goal: Normal bowel function is maintained or improved    Intervention: Educate patient and significant other/support system about diet, fluid intake, medications and activity to promote bowel function  Give medications as needed for n/v, Q4 hour bowel assessment, encouragement of intake of fluids

## 2018-06-16 NOTE — PROGRESS NOTES
[facility] Hospitalist Progress Note    Date of Service: 2018    Chief Complaint  27 y.o. female admitted 2018 with recurrent DKA    Interval Problem Update    Still not tolerating po vomited x3   BP stable   SR 70's                      Consultants/Specialty  Critical care    Disposition  ICU        Review of Systems   Constitutional: Positive for malaise/fatigue. Negative for chills and fever.   HENT: Negative for congestion, ear discharge and nosebleeds.    Eyes: Negative for discharge and redness.   Respiratory: Negative for cough, hemoptysis and shortness of breath.    Cardiovascular: Negative for chest pain, palpitations, orthopnea and leg swelling.   Gastrointestinal: Positive for nausea and vomiting. Negative for blood in stool and diarrhea.   Genitourinary: Negative for dysuria, flank pain and hematuria.   Musculoskeletal: Negative for back pain, joint pain and neck pain.   Skin: Negative.    Neurological: Negative for dizziness, tingling, speech change, focal weakness, seizures, loss of consciousness, weakness and headaches.   Psychiatric/Behavioral: Negative for hallucinations, memory loss and substance abuse. The patient is nervous/anxious.    All other systems reviewed and are negative.     Physical Exam  Laboratory/Imaging   Hemodynamics  Temp (24hrs), Av.7 °C (98.1 °F), Min:36.4 °C (97.5 °F), Max:36.9 °C (98.5 °F)   Temperature: 36.7 °C (98 °F)  Pulse  Av.5  Min: 82  Max: 142 Heart Rate (Monitored): 99  NIBP: 113/71      Respiratory      Respiration: 14, Pulse Oximetry: 93 %        RUL Breath Sounds: Clear, RML Breath Sounds: Clear, RLL Breath Sounds: Diminished, NANCY Breath Sounds: Clear, LLL Breath Sounds: Diminished    Fluids    Intake/Output Summary (Last 24 hours) at 18 0807  Last data filed at 18 0800   Gross per 24 hour   Intake             2564 ml   Output             3075 ml   Net             -511 ml       Nutrition  Orders Placed This Encounter   Procedures   •  DIET ORDER     Standing Status:   Standing     Number of Occurrences:   1     Order Specific Question:   Diet:     Answer:   Clear Liquids - No Red Foods [12]     Physical Exam   Constitutional: She is oriented to person, place, and time. She appears well-developed and well-nourished.   HENT:   Head: Normocephalic and atraumatic.   Right Ear: External ear normal.   Left Ear: External ear normal.   Mouth/Throat: No oropharyngeal exudate.   Eyes: Conjunctivae are normal. Pupils are equal, round, and reactive to light. Right eye exhibits no discharge. Left eye exhibits no discharge. No scleral icterus.   Neck: Neck supple. No JVD present. No tracheal deviation present.   Cardiovascular: Normal rate and regular rhythm.  Exam reveals no gallop and no friction rub.    No murmur heard.  Pulmonary/Chest: Effort normal and breath sounds normal. No respiratory distress. She has no wheezes. She has no rales. She exhibits no tenderness.   Abdominal: Soft. Bowel sounds are normal. She exhibits no distension and no mass. There is tenderness (mild). There is no rebound and no guarding.   Musculoskeletal: She exhibits no edema or tenderness.   Neurological: She is alert and oriented to person, place, and time. No cranial nerve deficit. She exhibits normal muscle tone.   Skin: Skin is warm and dry. She is not diaphoretic. No cyanosis or erythema. Nails show no clubbing.   Psychiatric: She has a normal mood and affect. Her behavior is normal.   Nursing note and vitals reviewed.      Recent Labs      06/14/18   0600  06/15/18   0500  06/16/18   0403   WBC  6.3  5.3  5.2   RBC  3.95*  4.10*  3.91*   HEMOGLOBIN  11.6*  12.0  11.7*   HEMATOCRIT  34.7*  35.3*  34.1*   MCV  87.8  86.1  87.2   MCH  29.4  29.3  29.9   MCHC  33.4*  34.0  34.3   RDW  42.0  40.4  41.3   PLATELETCT  212  225  210   MPV  10.1  10.4  10.7     Recent Labs      06/15/18   1100  06/15/18   1540  06/16/18   0403   SODIUM  135  139  137   POTASSIUM  4.0  4.5  4.2    CHLORIDE  107  113*  110   CO2  20  20  19*   GLUCOSE  251*  127*  208*   BUN  <3*  <3*  <3*   CREATININE  0.49*  0.46*  0.47*   CALCIUM  8.4*  8.1*  7.9*                      Assessment/Plan     * Diabetic ketoacidosis (HCC)- (present on admission)   Assessment & Plan    Resolved    Monitor CBG's        Intractable nausea and vomiting- (present on admission)   Assessment & Plan    Likely secondary to gastritis/esophagitis and gastroparesis  Continue scheduled Reglan and  Compazine  Continue IV Protonix and Carafate  Trial of erythromycin          Gastroparesis due to DM (HCC)- (present on admission)   Assessment & Plan    Continue Reglan/erythromycin and monitor continue IV fluids        Esophagitis- (present on admission)   Assessment & Plan    And gastritis as well as pyloric stenosis seen on EGD done on 6/9/18.  Continue Carafate and IV Protonix        Type 1 diabetes mellitus (HCC)- (present on admission)   Assessment & Plan    Uncontrolled with hyperglycemia   HbA1c 12.5    Increase Lantus 23 units bid  continue sliding-scale insulin and monitor blood sugars and adjust accordingly              Pyloric stenosis   Assessment & Plan    Status post dilatation on 6/9.   Abdominal x-ray negative  Discussed with Dr. Lopez from GI on 6-15 who recommended continuing  with current management and a trial of erythromycin        Anemia- (present on admission)   Assessment & Plan    Hemoglobin stable with no signs of bleeding          Quality-Core Measures   Reviewed items::  Labs reviewed, Medications reviewed and Radiology images reviewed  Pitt catheter::  No Pitt  DVT prophylaxis pharmacological::  Enoxaparin (Lovenox)  Ulcer Prophylaxis::  Yes      Plan of care reviewed with patient and discussed with nursing staff and Dr Seaman

## 2018-06-16 NOTE — PROGRESS NOTES
Pt refusing all oral medications at this time r/t vomiting. Pt started on PO abx today and unable to take. Will address with physician.

## 2018-06-16 NOTE — CARE PLAN
Problem: Infection  Goal: Will remain free from infection  Outcome: PROGRESSING AS EXPECTED  Assess s/s of infection. Standard precautions. Frequent hand washing.     Problem: Fluid Volume:  Goal: Will maintain balanced intake and output  Outcome: PROGRESSING SLOWER THAN EXPECTED  Monitor, educate, encourage compliance with intake of liquids. Assess I&Os. Meds as needed       DISPLAY PLAN FREE TEXT

## 2018-06-17 LAB
ANION GAP SERPL CALC-SCNC: 9 MMOL/L (ref 0–11.9)
BASOPHILS # BLD AUTO: 0.8 % (ref 0–1.8)
BASOPHILS # BLD: 0.04 K/UL (ref 0–0.12)
BUN SERPL-MCNC: <3 MG/DL (ref 8–22)
CALCIUM SERPL-MCNC: 7.9 MG/DL (ref 8.5–10.5)
CHLORIDE SERPL-SCNC: 108 MMOL/L (ref 96–112)
CO2 SERPL-SCNC: 21 MMOL/L (ref 20–33)
CREAT SERPL-MCNC: 0.38 MG/DL (ref 0.5–1.4)
EOSINOPHIL # BLD AUTO: 0.14 K/UL (ref 0–0.51)
EOSINOPHIL NFR BLD: 2.7 % (ref 0–6.9)
ERYTHROCYTE [DISTWIDTH] IN BLOOD BY AUTOMATED COUNT: 40.3 FL (ref 35.9–50)
GLUCOSE BLD-MCNC: 106 MG/DL (ref 65–99)
GLUCOSE BLD-MCNC: 112 MG/DL (ref 65–99)
GLUCOSE BLD-MCNC: 116 MG/DL (ref 65–99)
GLUCOSE BLD-MCNC: 128 MG/DL (ref 65–99)
GLUCOSE BLD-MCNC: 133 MG/DL (ref 65–99)
GLUCOSE SERPL-MCNC: 107 MG/DL (ref 65–99)
HCT VFR BLD AUTO: 33.7 % (ref 37–47)
HGB BLD-MCNC: 11.2 G/DL (ref 12–16)
IMM GRANULOCYTES # BLD AUTO: 0.01 K/UL (ref 0–0.11)
IMM GRANULOCYTES NFR BLD AUTO: 0.2 % (ref 0–0.9)
LYMPHOCYTES # BLD AUTO: 1.89 K/UL (ref 1–4.8)
LYMPHOCYTES NFR BLD: 37 % (ref 22–41)
MAGNESIUM SERPL-MCNC: 1.5 MG/DL (ref 1.5–2.5)
MCH RBC QN AUTO: 28.9 PG (ref 27–33)
MCHC RBC AUTO-ENTMCNC: 33.2 G/DL (ref 33.6–35)
MCV RBC AUTO: 87.1 FL (ref 81.4–97.8)
MONOCYTES # BLD AUTO: 0.45 K/UL (ref 0–0.85)
MONOCYTES NFR BLD AUTO: 8.8 % (ref 0–13.4)
NEUTROPHILS # BLD AUTO: 2.58 K/UL (ref 2–7.15)
NEUTROPHILS NFR BLD: 50.5 % (ref 44–72)
NRBC # BLD AUTO: 0 K/UL
NRBC BLD-RTO: 0 /100 WBC
PLATELET # BLD AUTO: 133 K/UL (ref 164–446)
PMV BLD AUTO: 10.8 FL (ref 9–12.9)
POTASSIUM SERPL-SCNC: 4.9 MMOL/L (ref 3.6–5.5)
RBC # BLD AUTO: 3.87 M/UL (ref 4.2–5.4)
SODIUM SERPL-SCNC: 138 MMOL/L (ref 135–145)
WBC # BLD AUTO: 5.1 K/UL (ref 4.8–10.8)

## 2018-06-17 PROCEDURE — 85025 COMPLETE CBC W/AUTO DIFF WBC: CPT

## 2018-06-17 PROCEDURE — 700111 HCHG RX REV CODE 636 W/ 250 OVERRIDE (IP): Performed by: FAMILY MEDICINE

## 2018-06-17 PROCEDURE — A9270 NON-COVERED ITEM OR SERVICE: HCPCS | Performed by: HOSPITALIST

## 2018-06-17 PROCEDURE — 700111 HCHG RX REV CODE 636 W/ 250 OVERRIDE (IP): Performed by: HOSPITALIST

## 2018-06-17 PROCEDURE — 83735 ASSAY OF MAGNESIUM: CPT

## 2018-06-17 PROCEDURE — A9270 NON-COVERED ITEM OR SERVICE: HCPCS | Performed by: FAMILY MEDICINE

## 2018-06-17 PROCEDURE — C9113 INJ PANTOPRAZOLE SODIUM, VIA: HCPCS | Performed by: HOSPITALIST

## 2018-06-17 PROCEDURE — 99232 SBSQ HOSP IP/OBS MODERATE 35: CPT | Performed by: HOSPITALIST

## 2018-06-17 PROCEDURE — 700102 HCHG RX REV CODE 250 W/ 637 OVERRIDE(OP): Performed by: FAMILY MEDICINE

## 2018-06-17 PROCEDURE — 770006 HCHG ROOM/CARE - MED/SURG/GYN SEMI*

## 2018-06-17 PROCEDURE — 80048 BASIC METABOLIC PNL TOTAL CA: CPT

## 2018-06-17 PROCEDURE — 82962 GLUCOSE BLOOD TEST: CPT

## 2018-06-17 PROCEDURE — 700105 HCHG RX REV CODE 258: Performed by: HOSPITALIST

## 2018-06-17 PROCEDURE — 700102 HCHG RX REV CODE 250 W/ 637 OVERRIDE(OP): Performed by: HOSPITALIST

## 2018-06-17 RX ORDER — MAGNESIUM SULFATE HEPTAHYDRATE 40 MG/ML
4 INJECTION, SOLUTION INTRAVENOUS ONCE
Status: COMPLETED | OUTPATIENT
Start: 2018-06-17 | End: 2018-06-17

## 2018-06-17 RX ADMIN — ENOXAPARIN SODIUM 40 MG: 100 INJECTION SUBCUTANEOUS at 20:48

## 2018-06-17 RX ADMIN — METOCLOPRAMIDE 10 MG: 5 INJECTION, SOLUTION INTRAMUSCULAR; INTRAVENOUS at 00:10

## 2018-06-17 RX ADMIN — SUCRALFATE 1 G: 1 SUSPENSION ORAL at 12:22

## 2018-06-17 RX ADMIN — METOCLOPRAMIDE 10 MG: 5 INJECTION, SOLUTION INTRAMUSCULAR; INTRAVENOUS at 23:33

## 2018-06-17 RX ADMIN — PANTOPRAZOLE SODIUM 40 MG: 40 INJECTION, POWDER, LYOPHILIZED, FOR SOLUTION INTRAVENOUS at 09:52

## 2018-06-17 RX ADMIN — MORPHINE SULFATE 4 MG: 4 INJECTION INTRAVENOUS at 16:11

## 2018-06-17 RX ADMIN — ERYTHROMYCIN 250 MG: 250 TABLET, DELAYED RELEASE ORAL at 09:55

## 2018-06-17 RX ADMIN — ONDANSETRON 4 MG: 2 INJECTION INTRAMUSCULAR; INTRAVENOUS at 20:42

## 2018-06-17 RX ADMIN — ONDANSETRON 4 MG: 2 INJECTION INTRAMUSCULAR; INTRAVENOUS at 08:34

## 2018-06-17 RX ADMIN — ONDANSETRON 4 MG: 2 INJECTION INTRAMUSCULAR; INTRAVENOUS at 03:55

## 2018-06-17 RX ADMIN — MORPHINE SULFATE 4 MG: 4 INJECTION INTRAVENOUS at 08:33

## 2018-06-17 RX ADMIN — MORPHINE SULFATE 4 MG: 4 INJECTION INTRAVENOUS at 20:44

## 2018-06-17 RX ADMIN — METOCLOPRAMIDE 10 MG: 5 INJECTION, SOLUTION INTRAMUSCULAR; INTRAVENOUS at 17:37

## 2018-06-17 RX ADMIN — PROCHLORPERAZINE EDISYLATE 5 MG: 5 INJECTION INTRAMUSCULAR; INTRAVENOUS at 06:18

## 2018-06-17 RX ADMIN — INSULIN GLARGINE 23 UNITS: 100 INJECTION, SOLUTION SUBCUTANEOUS at 20:57

## 2018-06-17 RX ADMIN — METOCLOPRAMIDE 10 MG: 5 INJECTION, SOLUTION INTRAMUSCULAR; INTRAVENOUS at 12:22

## 2018-06-17 RX ADMIN — SODIUM CHLORIDE, POTASSIUM CHLORIDE, SODIUM LACTATE AND CALCIUM CHLORIDE: 600; 310; 30; 20 INJECTION, SOLUTION INTRAVENOUS at 08:29

## 2018-06-17 RX ADMIN — ERYTHROMYCIN 250 MG: 250 TABLET, DELAYED RELEASE ORAL at 20:49

## 2018-06-17 RX ADMIN — MORPHINE SULFATE 4 MG: 4 INJECTION INTRAVENOUS at 03:55

## 2018-06-17 RX ADMIN — METOCLOPRAMIDE 10 MG: 5 INJECTION, SOLUTION INTRAMUSCULAR; INTRAVENOUS at 06:18

## 2018-06-17 RX ADMIN — PANTOPRAZOLE SODIUM 40 MG: 40 INJECTION, POWDER, LYOPHILIZED, FOR SOLUTION INTRAVENOUS at 22:34

## 2018-06-17 RX ADMIN — PROCHLORPERAZINE EDISYLATE 5 MG: 5 INJECTION INTRAMUSCULAR; INTRAVENOUS at 23:32

## 2018-06-17 RX ADMIN — PROCHLORPERAZINE EDISYLATE 10 MG: 5 INJECTION INTRAMUSCULAR; INTRAVENOUS at 10:13

## 2018-06-17 RX ADMIN — MAGNESIUM SULFATE IN WATER 4 G: 40 INJECTION, SOLUTION INTRAVENOUS at 17:37

## 2018-06-17 RX ADMIN — ERYTHROMYCIN 250 MG: 250 TABLET, DELAYED RELEASE ORAL at 15:22

## 2018-06-17 RX ADMIN — INSULIN GLARGINE 23 UNITS: 100 INJECTION, SOLUTION SUBCUTANEOUS at 09:52

## 2018-06-17 ASSESSMENT — ENCOUNTER SYMPTOMS
HEMOPTYSIS: 0
FEVER: 0
BLURRED VISION: 0
WEAKNESS: 0
HALLUCINATIONS: 0
MEMORY LOSS: 0
CHILLS: 0
NERVOUS/ANXIOUS: 0
EYE DISCHARGE: 0
PND: 0
BLOOD IN STOOL: 0
EYE REDNESS: 0
PALPITATIONS: 0
BACK PAIN: 0
COUGH: 0
NEUROLOGICAL NEGATIVE: 1
SHORTNESS OF BREATH: 0
NAUSEA: 1
WHEEZING: 0
VOMITING: 1

## 2018-06-17 ASSESSMENT — PAIN SCALES - GENERAL
PAINLEVEL_OUTOF10: 8
PAINLEVEL_OUTOF10: 7
PAINLEVEL_OUTOF10: 8
PAINLEVEL_OUTOF10: 7
PAINLEVEL_OUTOF10: 5
PAINLEVEL_OUTOF10: 8

## 2018-06-17 ASSESSMENT — LIFESTYLE VARIABLES: SUBSTANCE_ABUSE: 0

## 2018-06-17 NOTE — PROGRESS NOTES
CRITICAL CARE MEDICINE   BRIEF PROGRESS NOTE    Date of service: 6/17/2018  Time: 1030    Patient's case patient rediscuss this morning and she already had a room to be transferred to centers off the insulin infusion and transition to DKA subcutaneous protocol.    I'm still concerned the patient may have a gastric outlet obstruction related to her pyloric stenosis and recent dilatation as she continues to have intermittent emesis.    Would recommend gastroenterology reconsultation since she is not taking oral erythromycin tablet and her esophagitis and/or gastritis may continue to worsen with repeat repeated emesis.    This was discussed with hospitalist who spoke with gastroenterology as well as on multidisciplinary rounds this week.    I did not see this patient today as she was already being transferred out of the ICU prior to our evaluation.  Critical care will sign off at this time.    Thom Seaman D.O.  Critical Care Medicine

## 2018-06-17 NOTE — CARE PLAN
Problem: Bowel/Gastric:  Goal: Normal bowel function is maintained or improved  Scheduled antinausea medications given as well as PRN's when needed. Advancing diet as tolerated.     Problem: Pain Management  Goal: Pain level will decrease to patient's comfort goal  0-10 nursing pain scale in use. PRN pain medication given when needed.  Pillows in use for splinting while coughing.

## 2018-06-17 NOTE — PROGRESS NOTES
2 RN skin check performed with Eden CHAVIRA. Scab present on nape of neck.  No other abnormalities noted.

## 2018-06-17 NOTE — PROGRESS NOTES
Pt A&OX4, mother present in room. Pt c/o N/V and pain and medicated for both. Pt continues to have N/V with green emesis.  Pt educated on POC and verbalized understanding. Hourly rounding in place.

## 2018-06-17 NOTE — PROGRESS NOTES
[facility] Hospitalist Progress Note    Date of Service: 2018    Chief Complaint  27 y.o. female admitted 2018 with recurrent DKA    Interval Problem Update    Transferred out of ICU  Mother at bedside  Tolerated some clears and erythromycin reports that she is feeling better                    Consultants/Specialty  Critical care    Disposition  Medical         Review of Systems   Constitutional: Negative for chills and fever.   HENT: Negative.    Eyes: Negative for blurred vision, discharge and redness.   Respiratory: Negative for cough, hemoptysis, shortness of breath and wheezing.    Cardiovascular: Negative for chest pain, palpitations, leg swelling and PND.   Gastrointestinal: Positive for nausea and vomiting. Negative for blood in stool and melena.   Genitourinary: Negative.    Musculoskeletal: Negative for back pain and joint pain.   Skin: Negative.    Neurological: Negative.  Negative for weakness.   Psychiatric/Behavioral: Negative for hallucinations, memory loss and substance abuse. The patient is not nervous/anxious.    All other systems reviewed and are negative.     Physical Exam  Laboratory/Imaging   Hemodynamics  Temp (24hrs), Av.7 °C (98 °F), Min:36.2 °C (97.1 °F), Max:37.1 °C (98.8 °F)   Temperature: 36.6 °C (97.8 °F)  Pulse  Av.8  Min: 82  Max: 142 Heart Rate (Monitored): 93  Blood Pressure: 154/93, NIBP: 118/72      Respiratory      Respiration: 16, Pulse Oximetry: 94 %        RUL Breath Sounds: Clear, RML Breath Sounds: Clear, RLL Breath Sounds: Diminished, NANCY Breath Sounds: Clear, LLL Breath Sounds: Diminished    Fluids    Intake/Output Summary (Last 24 hours) at 18 1615  Last data filed at 18 0600   Gross per 24 hour   Intake             1440 ml   Output             2100 ml   Net             -660 ml       Nutrition  Orders Placed This Encounter   Procedures   • DIET ORDER     Standing Status:   Standing     Number of Occurrences:   1     Order Specific Question:    Diet:     Answer:   Clear Liquids - No Red Foods [12]     Physical Exam   Constitutional: She is oriented to person, place, and time. She appears well-developed and well-nourished.   HENT:   Head: Normocephalic and atraumatic.   Mouth/Throat: Oropharynx is clear and moist. No oropharyngeal exudate.   Eyes: Conjunctivae are normal. Pupils are equal, round, and reactive to light. Right eye exhibits no discharge. Left eye exhibits no discharge. No scleral icterus.   Neck: Neck supple. No JVD present. No tracheal deviation present.   Cardiovascular: Normal rate and regular rhythm.  Exam reveals no gallop and no friction rub.    No murmur heard.  Pulmonary/Chest: Effort normal and breath sounds normal. No stridor. No respiratory distress. She exhibits no tenderness.   Abdominal: Soft. Bowel sounds are normal. She exhibits no distension. There is tenderness. There is no rebound.   Mild generalized tenderness   Musculoskeletal: She exhibits no edema or tenderness.   Neurological: She is alert and oriented to person, place, and time. No cranial nerve deficit. She exhibits normal muscle tone.   Skin: Skin is warm and dry. No rash noted. She is not diaphoretic. No cyanosis or erythema. Nails show no clubbing.   Psychiatric: She has a normal mood and affect. Her behavior is normal.   Nursing note and vitals reviewed.      Recent Labs      06/15/18   0500  06/16/18   0403  06/17/18   0443   WBC  5.3  5.2  5.1   RBC  4.10*  3.91*  3.87*   HEMOGLOBIN  12.0  11.7*  11.2*   HEMATOCRIT  35.3*  34.1*  33.7*   MCV  86.1  87.2  87.1   MCH  29.3  29.9  28.9   MCHC  34.0  34.3  33.2*   RDW  40.4  41.3  40.3   PLATELETCT  225  210  133*   MPV  10.4  10.7  10.8     Recent Labs      06/15/18   1540  06/16/18   0403  06/17/18   0443   SODIUM  139  137  138   POTASSIUM  4.5  4.2  4.9   CHLORIDE  113*  110  108   CO2  20  19*  21   GLUCOSE  127*  208*  107*   BUN  <3*  <3*  <3*   CREATININE  0.46*  0.47*  0.38*   CALCIUM  8.1*  7.9*  7.9*                       Assessment/Plan     * Diabetic ketoacidosis (HCC)- (present on admission)   Assessment & Plan    Resolved    Monitor CBG's        Intractable nausea and vomiting- (present on admission)   Assessment & Plan    Per patient she feels slightly improved today  Likely secondary to gastritis/esophagitis and gastroparesis    Continue Reglan and Compazine  Continue IV Protonix  Continue Carafate  Continue hydration with IV fluids        Gastroparesis due to DM (HCC)- (present on admission)   Assessment & Plan    Scheduled Reglan and Compazine  IV fluids for hydration  Erythromycin        Esophagitis- (present on admission)   Assessment & Plan    And gastritis as well as pyloric stenosis seen on EGD done on 6/9/18.  Continue Carafate and IV Protonix        Type 1 diabetes mellitus (HCC)- (present on admission)   Assessment & Plan    Uncontrolled with hyperglycemia   HbA1c 12.5    Improved controlled continue Lantus 23 units twice a day and sliding scale insulin and close monitoring              Hypomagnesemia   Assessment & Plan    4 g of magnesium sulfate  Monitor electrolytes        Pyloric stenosis   Assessment & Plan    Status post dilatation on 6/9.   Abdominal x-ray was negative  Discussed with Dr. Lopez from GI he recommended medical management and erythromycin to help with gastroparesis        Anemia- (present on admission)   Assessment & Plan    No signs of bleeding hemoglobin overall stable          Quality-Core Measures   Reviewed items::  Labs reviewed, Medications reviewed and Radiology images reviewed  Pitt catheter::  No Pitt  DVT prophylaxis pharmacological::  Enoxaparin (Lovenox)  Ulcer Prophylaxis::  Yes      Plan of care reviewed with patient and discussed with nursing staff and Dr Seaman

## 2018-06-18 ENCOUNTER — APPOINTMENT (OUTPATIENT)
Dept: RADIOLOGY | Facility: MEDICAL CENTER | Age: 28
DRG: 637 | End: 2018-06-18
Attending: HOSPITALIST
Payer: MEDICAID

## 2018-06-18 LAB
ANION GAP SERPL CALC-SCNC: 8 MMOL/L (ref 0–11.9)
BUN SERPL-MCNC: <3 MG/DL (ref 8–22)
CALCIUM SERPL-MCNC: 8.5 MG/DL (ref 8.5–10.5)
CHLORIDE SERPL-SCNC: 106 MMOL/L (ref 96–112)
CO2 SERPL-SCNC: 26 MMOL/L (ref 20–33)
CREAT SERPL-MCNC: 0.47 MG/DL (ref 0.5–1.4)
GLUCOSE BLD-MCNC: 111 MG/DL (ref 65–99)
GLUCOSE BLD-MCNC: 124 MG/DL (ref 65–99)
GLUCOSE BLD-MCNC: 126 MG/DL (ref 65–99)
GLUCOSE BLD-MCNC: 130 MG/DL (ref 65–99)
GLUCOSE BLD-MCNC: 89 MG/DL (ref 65–99)
GLUCOSE BLD-MCNC: 94 MG/DL (ref 65–99)
GLUCOSE SERPL-MCNC: 112 MG/DL (ref 65–99)
MAGNESIUM SERPL-MCNC: 2 MG/DL (ref 1.5–2.5)
PHOSPHATE SERPL-MCNC: 4.5 MG/DL (ref 2.5–4.5)
POTASSIUM SERPL-SCNC: 4 MMOL/L (ref 3.6–5.5)
SODIUM SERPL-SCNC: 140 MMOL/L (ref 135–145)

## 2018-06-18 PROCEDURE — 82962 GLUCOSE BLOOD TEST: CPT

## 2018-06-18 PROCEDURE — 700111 HCHG RX REV CODE 636 W/ 250 OVERRIDE (IP): Performed by: HOSPITALIST

## 2018-06-18 PROCEDURE — 700102 HCHG RX REV CODE 250 W/ 637 OVERRIDE(OP): Performed by: FAMILY MEDICINE

## 2018-06-18 PROCEDURE — 36415 COLL VENOUS BLD VENIPUNCTURE: CPT

## 2018-06-18 PROCEDURE — 99233 SBSQ HOSP IP/OBS HIGH 50: CPT | Performed by: HOSPITALIST

## 2018-06-18 PROCEDURE — 770006 HCHG ROOM/CARE - MED/SURG/GYN SEMI*

## 2018-06-18 PROCEDURE — 700111 HCHG RX REV CODE 636 W/ 250 OVERRIDE (IP): Performed by: FAMILY MEDICINE

## 2018-06-18 PROCEDURE — 84100 ASSAY OF PHOSPHORUS: CPT

## 2018-06-18 PROCEDURE — C9113 INJ PANTOPRAZOLE SODIUM, VIA: HCPCS | Performed by: HOSPITALIST

## 2018-06-18 PROCEDURE — 700102 HCHG RX REV CODE 250 W/ 637 OVERRIDE(OP): Performed by: HOSPITALIST

## 2018-06-18 PROCEDURE — 83735 ASSAY OF MAGNESIUM: CPT

## 2018-06-18 PROCEDURE — A9270 NON-COVERED ITEM OR SERVICE: HCPCS | Performed by: FAMILY MEDICINE

## 2018-06-18 PROCEDURE — 700105 HCHG RX REV CODE 258: Performed by: HOSPITALIST

## 2018-06-18 PROCEDURE — 80048 BASIC METABOLIC PNL TOTAL CA: CPT

## 2018-06-18 RX ADMIN — ENOXAPARIN SODIUM 40 MG: 100 INJECTION SUBCUTANEOUS at 20:58

## 2018-06-18 RX ADMIN — ONDANSETRON 4 MG: 2 INJECTION INTRAMUSCULAR; INTRAVENOUS at 03:16

## 2018-06-18 RX ADMIN — MORPHINE SULFATE 4 MG: 4 INJECTION INTRAVENOUS at 20:58

## 2018-06-18 RX ADMIN — MORPHINE SULFATE 4 MG: 4 INJECTION INTRAVENOUS at 08:41

## 2018-06-18 RX ADMIN — PANTOPRAZOLE SODIUM 40 MG: 40 INJECTION, POWDER, LYOPHILIZED, FOR SOLUTION INTRAVENOUS at 21:10

## 2018-06-18 RX ADMIN — METOCLOPRAMIDE 10 MG: 5 INJECTION, SOLUTION INTRAMUSCULAR; INTRAVENOUS at 17:42

## 2018-06-18 RX ADMIN — SODIUM CHLORIDE, POTASSIUM CHLORIDE, SODIUM LACTATE AND CALCIUM CHLORIDE: 600; 310; 30; 20 INJECTION, SOLUTION INTRAVENOUS at 12:41

## 2018-06-18 RX ADMIN — MORPHINE SULFATE 4 MG: 4 INJECTION INTRAVENOUS at 16:07

## 2018-06-18 RX ADMIN — PROCHLORPERAZINE EDISYLATE 5 MG: 5 INJECTION INTRAMUSCULAR; INTRAVENOUS at 06:44

## 2018-06-18 RX ADMIN — MORPHINE SULFATE 4 MG: 4 INJECTION INTRAVENOUS at 03:18

## 2018-06-18 RX ADMIN — PROCHLORPERAZINE EDISYLATE 5 MG: 5 INJECTION INTRAMUSCULAR; INTRAVENOUS at 16:06

## 2018-06-18 RX ADMIN — ONDANSETRON 4 MG: 2 INJECTION INTRAMUSCULAR; INTRAVENOUS at 21:10

## 2018-06-18 RX ADMIN — METOCLOPRAMIDE 10 MG: 5 INJECTION, SOLUTION INTRAMUSCULAR; INTRAVENOUS at 05:48

## 2018-06-18 RX ADMIN — INSULIN GLARGINE 23 UNITS: 100 INJECTION, SOLUTION SUBCUTANEOUS at 08:52

## 2018-06-18 RX ADMIN — INSULIN GLARGINE 23 UNITS: 100 INJECTION, SOLUTION SUBCUTANEOUS at 21:07

## 2018-06-18 RX ADMIN — PANTOPRAZOLE SODIUM 40 MG: 40 INJECTION, POWDER, LYOPHILIZED, FOR SOLUTION INTRAVENOUS at 08:56

## 2018-06-18 ASSESSMENT — ENCOUNTER SYMPTOMS
NERVOUS/ANXIOUS: 0
NEUROLOGICAL NEGATIVE: 1
CHILLS: 0
WEAKNESS: 0
PND: 0
MEMORY LOSS: 0
HEMOPTYSIS: 0
NAUSEA: 1
FEVER: 0
HALLUCINATIONS: 0
EYE REDNESS: 0
COUGH: 0
SHORTNESS OF BREATH: 0
PALPITATIONS: 0
BLURRED VISION: 0
BACK PAIN: 0
EYE DISCHARGE: 0
WHEEZING: 0
VOMITING: 1
BLOOD IN STOOL: 0

## 2018-06-18 ASSESSMENT — PAIN SCALES - GENERAL
PAINLEVEL_OUTOF10: 4
PAINLEVEL_OUTOF10: 5
PAINLEVEL_OUTOF10: 8
PAINLEVEL_OUTOF10: 8
PAINLEVEL_OUTOF10: 5
PAINLEVEL_OUTOF10: 5
PAINLEVEL_OUTOF10: 9
PAINLEVEL_OUTOF10: 4

## 2018-06-18 ASSESSMENT — LIFESTYLE VARIABLES: SUBSTANCE_ABUSE: 0

## 2018-06-18 NOTE — PROGRESS NOTES
Assumed care of pt at 0700. Received bedside report from nightshift RN. Pt is A&O X4. Pt unable to tolerate any PO medication or food, MD notified. Pt reports tolerable pain at this time. Pt educated on calling for assistance to get up, pt verbalized understanding. Treaded slipper socks in use. Call light is within reach. Bed is locked and in the lowest position. Hourly rounding in place.

## 2018-06-18 NOTE — CARE PLAN
Problem: Safety  Goal: Will remain free from injury  Outcome: PROGRESSING AS EXPECTED  Pt educated on calling for assistance when getting out of bed. Pt verbalized understanding.    Problem: Bowel/Gastric:  Goal: Normal bowel function is maintained or improved  Outcome: PROGRESSING SLOWER THAN EXPECTED  Pt unable to tolerate PO medication or food. MD aware.

## 2018-06-18 NOTE — CARE PLAN
Problem: Bowel/Gastric:  Goal: Normal bowel function is maintained or improved  Outcome: PROGRESSING AS EXPECTED  Patient experiencing intermittent nausea with vomiting of thin, green liquid. PRN zofran given. Scheduled compazine and reglan given.    Problem: Respiratory:  Goal: Respiratory status will improve  Outcome: PROGRESSING AS EXPECTED  Patient on room air, oxygen saturation 90-93%, shallow respirations.

## 2018-06-18 NOTE — PROGRESS NOTES
Received report and assumed care of patient at 1900. Patient alert and oriented x4, very drowsy/fatigued but awakens easily. Reporting 8/10 abdominal pain, PRN IV morphine given for relief. Patient continues to have nausea with vomiting. Green liquid emesis. PRN and scheduled anti-emetics given. Patient not tolerating PO intake, has had a few sips of Sprite overnight otherwise declines fluids. Discussed plan of care and encouraged patient to make her needs known overnight. Bed locked and in lowest position, treaded socks in place, call light within reach. Hourly rounding in place.

## 2018-06-18 NOTE — PROGRESS NOTES
Patient called out to use bathroom, ambulated with assist of the CNA to the BR. After returning to bed the patient's heart rate was 136. BP at this time was 116/90. The patient was reporting 9/10 abdominal pain, slightly shaky. Administered PRN IV morphine and the patient's HR began to decrease. The patient reported a relief in pain. HR a few minutes after morphine administration was 99. Patient's finger stick glucose was 94 at this time. Continuous pulse oximeter in place, patient now resting, will continue to monitor.

## 2018-06-18 NOTE — CARE PLAN
Problem: Nutritional:  Goal: Achieve adequate nutritional intake  Patient will advance diet past clears and consume >50% of meals   Outcome: PROGRESSING SLOWER THAN EXPECTED    Intervention: Advance diet as tolerated  Pt is on a clear liquid diet.   Has been on clears since admit, x 5 days.   Has had inadequate nutrition x 5 days at this time.   Pt continues to have N/V per review of nursing notes from early this AM.     RD will continue to follow for diet advancement

## 2018-06-19 ENCOUNTER — APPOINTMENT (OUTPATIENT)
Dept: RADIOLOGY | Facility: MEDICAL CENTER | Age: 28
DRG: 637 | End: 2018-06-19
Attending: HOSPITALIST
Payer: MEDICAID

## 2018-06-19 LAB
ANION GAP SERPL CALC-SCNC: 9 MMOL/L (ref 0–11.9)
BASOPHILS # BLD AUTO: 0.7 % (ref 0–1.8)
BASOPHILS # BLD: 0.04 K/UL (ref 0–0.12)
BUN SERPL-MCNC: 3 MG/DL (ref 8–22)
CALCIUM SERPL-MCNC: 9 MG/DL (ref 8.5–10.5)
CHLORIDE SERPL-SCNC: 106 MMOL/L (ref 96–112)
CO2 SERPL-SCNC: 26 MMOL/L (ref 20–33)
CREAT SERPL-MCNC: 0.42 MG/DL (ref 0.5–1.4)
EOSINOPHIL # BLD AUTO: 0.14 K/UL (ref 0–0.51)
EOSINOPHIL NFR BLD: 2.3 % (ref 0–6.9)
ERYTHROCYTE [DISTWIDTH] IN BLOOD BY AUTOMATED COUNT: 39.2 FL (ref 35.9–50)
GLUCOSE BLD-MCNC: 117 MG/DL (ref 65–99)
GLUCOSE BLD-MCNC: 122 MG/DL (ref 65–99)
GLUCOSE BLD-MCNC: 66 MG/DL (ref 65–99)
GLUCOSE BLD-MCNC: 67 MG/DL (ref 65–99)
GLUCOSE BLD-MCNC: 73 MG/DL (ref 65–99)
GLUCOSE BLD-MCNC: 90 MG/DL (ref 65–99)
GLUCOSE BLD-MCNC: 90 MG/DL (ref 65–99)
GLUCOSE SERPL-MCNC: 72 MG/DL (ref 65–99)
HCT VFR BLD AUTO: 38.5 % (ref 37–47)
HGB BLD-MCNC: 13 G/DL (ref 12–16)
IMM GRANULOCYTES # BLD AUTO: 0.03 K/UL (ref 0–0.11)
IMM GRANULOCYTES NFR BLD AUTO: 0.5 % (ref 0–0.9)
LYMPHOCYTES # BLD AUTO: 2.11 K/UL (ref 1–4.8)
LYMPHOCYTES NFR BLD: 34.6 % (ref 22–41)
MCH RBC QN AUTO: 29 PG (ref 27–33)
MCHC RBC AUTO-ENTMCNC: 33.8 G/DL (ref 33.6–35)
MCV RBC AUTO: 85.7 FL (ref 81.4–97.8)
MONOCYTES # BLD AUTO: 0.62 K/UL (ref 0–0.85)
MONOCYTES NFR BLD AUTO: 10.2 % (ref 0–13.4)
NEUTROPHILS # BLD AUTO: 3.15 K/UL (ref 2–7.15)
NEUTROPHILS NFR BLD: 51.7 % (ref 44–72)
NRBC # BLD AUTO: 0 K/UL
NRBC BLD-RTO: 0 /100 WBC
PLATELET # BLD AUTO: 210 K/UL (ref 164–446)
PMV BLD AUTO: 10.7 FL (ref 9–12.9)
POTASSIUM SERPL-SCNC: 3.6 MMOL/L (ref 3.6–5.5)
RBC # BLD AUTO: 4.49 M/UL (ref 4.2–5.4)
SODIUM SERPL-SCNC: 141 MMOL/L (ref 135–145)
WBC # BLD AUTO: 6.1 K/UL (ref 4.8–10.8)

## 2018-06-19 PROCEDURE — 85025 COMPLETE CBC W/AUTO DIFF WBC: CPT

## 2018-06-19 PROCEDURE — 700111 HCHG RX REV CODE 636 W/ 250 OVERRIDE (IP): Performed by: HOSPITALIST

## 2018-06-19 PROCEDURE — 80048 BASIC METABOLIC PNL TOTAL CA: CPT

## 2018-06-19 PROCEDURE — 700101 HCHG RX REV CODE 250: Performed by: HOSPITALIST

## 2018-06-19 PROCEDURE — 99232 SBSQ HOSP IP/OBS MODERATE 35: CPT | Performed by: INTERNAL MEDICINE

## 2018-06-19 PROCEDURE — 36415 COLL VENOUS BLD VENIPUNCTURE: CPT

## 2018-06-19 PROCEDURE — 700117 HCHG RX CONTRAST REV CODE 255: Performed by: HOSPITALIST

## 2018-06-19 PROCEDURE — 700105 HCHG RX REV CODE 258: Performed by: HOSPITALIST

## 2018-06-19 PROCEDURE — 74241 DX-UPPER GI-SERIES WITH KUB: CPT

## 2018-06-19 PROCEDURE — 700111 HCHG RX REV CODE 636 W/ 250 OVERRIDE (IP): Performed by: FAMILY MEDICINE

## 2018-06-19 PROCEDURE — 770006 HCHG ROOM/CARE - MED/SURG/GYN SEMI*

## 2018-06-19 PROCEDURE — 700111 HCHG RX REV CODE 636 W/ 250 OVERRIDE (IP): Performed by: INTERNAL MEDICINE

## 2018-06-19 PROCEDURE — C9113 INJ PANTOPRAZOLE SODIUM, VIA: HCPCS | Performed by: HOSPITALIST

## 2018-06-19 PROCEDURE — 82962 GLUCOSE BLOOD TEST: CPT | Mod: 91

## 2018-06-19 RX ORDER — METOCLOPRAMIDE HYDROCHLORIDE 5 MG/ML
10 INJECTION INTRAMUSCULAR; INTRAVENOUS
Status: DISCONTINUED | OUTPATIENT
Start: 2018-06-19 | End: 2018-06-19

## 2018-06-19 RX ORDER — METOCLOPRAMIDE HYDROCHLORIDE 5 MG/ML
10 INJECTION INTRAMUSCULAR; INTRAVENOUS
Status: DISCONTINUED | OUTPATIENT
Start: 2018-06-19 | End: 2018-06-26

## 2018-06-19 RX ADMIN — MORPHINE SULFATE 4 MG: 4 INJECTION INTRAVENOUS at 11:40

## 2018-06-19 RX ADMIN — IOHEXOL 100 ML: 300 INJECTION, SOLUTION INTRAVENOUS at 09:48

## 2018-06-19 RX ADMIN — METOCLOPRAMIDE 10 MG: 5 INJECTION, SOLUTION INTRAMUSCULAR; INTRAVENOUS at 12:03

## 2018-06-19 RX ADMIN — PROCHLORPERAZINE EDISYLATE 5 MG: 5 INJECTION INTRAMUSCULAR; INTRAVENOUS at 08:29

## 2018-06-19 RX ADMIN — ONDANSETRON 4 MG: 2 INJECTION INTRAMUSCULAR; INTRAVENOUS at 03:29

## 2018-06-19 RX ADMIN — MORPHINE SULFATE 4 MG: 4 INJECTION INTRAVENOUS at 23:47

## 2018-06-19 RX ADMIN — METOCLOPRAMIDE 10 MG: 5 INJECTION, SOLUTION INTRAMUSCULAR; INTRAVENOUS at 06:43

## 2018-06-19 RX ADMIN — ENOXAPARIN SODIUM 40 MG: 100 INJECTION SUBCUTANEOUS at 20:10

## 2018-06-19 RX ADMIN — PROCHLORPERAZINE EDISYLATE 5 MG: 5 INJECTION INTRAMUSCULAR; INTRAVENOUS at 01:22

## 2018-06-19 RX ADMIN — PANTOPRAZOLE SODIUM 40 MG: 40 INJECTION, POWDER, LYOPHILIZED, FOR SOLUTION INTRAVENOUS at 20:10

## 2018-06-19 RX ADMIN — SODIUM CHLORIDE, POTASSIUM CHLORIDE, SODIUM LACTATE AND CALCIUM CHLORIDE: 600; 310; 30; 20 INJECTION, SOLUTION INTRAVENOUS at 15:17

## 2018-06-19 RX ADMIN — ONDANSETRON 4 MG: 2 INJECTION INTRAMUSCULAR; INTRAVENOUS at 15:14

## 2018-06-19 RX ADMIN — METOCLOPRAMIDE 10 MG: 5 INJECTION, SOLUTION INTRAMUSCULAR; INTRAVENOUS at 01:22

## 2018-06-19 RX ADMIN — MORPHINE SULFATE 4 MG: 4 INJECTION INTRAVENOUS at 17:08

## 2018-06-19 RX ADMIN — SODIUM CHLORIDE, POTASSIUM CHLORIDE, SODIUM LACTATE AND CALCIUM CHLORIDE: 600; 310; 30; 20 INJECTION, SOLUTION INTRAVENOUS at 01:29

## 2018-06-19 RX ADMIN — DEXTROSE MONOHYDRATE 25 ML: 25 INJECTION, SOLUTION INTRAVENOUS at 16:46

## 2018-06-19 RX ADMIN — PANTOPRAZOLE SODIUM 40 MG: 40 INJECTION, POWDER, LYOPHILIZED, FOR SOLUTION INTRAVENOUS at 08:28

## 2018-06-19 RX ADMIN — PROCHLORPERAZINE EDISYLATE 5 MG: 5 INJECTION INTRAMUSCULAR; INTRAVENOUS at 23:13

## 2018-06-19 RX ADMIN — METOCLOPRAMIDE 10 MG: 5 INJECTION, SOLUTION INTRAMUSCULAR; INTRAVENOUS at 16:40

## 2018-06-19 RX ADMIN — MORPHINE SULFATE 4 MG: 4 INJECTION INTRAVENOUS at 05:33

## 2018-06-19 RX ADMIN — DEXTROSE MONOHYDRATE 25 ML: 25 INJECTION, SOLUTION INTRAVENOUS at 06:49

## 2018-06-19 RX ADMIN — PROCHLORPERAZINE EDISYLATE 10 MG: 5 INJECTION INTRAMUSCULAR; INTRAVENOUS at 05:47

## 2018-06-19 RX ADMIN — METOCLOPRAMIDE 10 MG: 5 INJECTION, SOLUTION INTRAMUSCULAR; INTRAVENOUS at 20:10

## 2018-06-19 ASSESSMENT — ENCOUNTER SYMPTOMS
CHILLS: 0
MEMORY LOSS: 0
VOMITING: 1
PND: 0
COUGH: 0
WHEEZING: 0
BACK PAIN: 0
FEVER: 0
NEUROLOGICAL NEGATIVE: 1
SHORTNESS OF BREATH: 0
BLOOD IN STOOL: 0
WEAKNESS: 0
HALLUCINATIONS: 0
NAUSEA: 1
BLURRED VISION: 0
NERVOUS/ANXIOUS: 0
EYE DISCHARGE: 0
PALPITATIONS: 0
EYE REDNESS: 0
HEMOPTYSIS: 0

## 2018-06-19 ASSESSMENT — LIFESTYLE VARIABLES: SUBSTANCE_ABUSE: 0

## 2018-06-19 ASSESSMENT — PAIN SCALES - GENERAL
PAINLEVEL_OUTOF10: 5
PAINLEVEL_OUTOF10: 8
PAINLEVEL_OUTOF10: 4
PAINLEVEL_OUTOF10: 8
PAINLEVEL_OUTOF10: 6
PAINLEVEL_OUTOF10: 5
PAINLEVEL_OUTOF10: 0
PAINLEVEL_OUTOF10: 9
PAINLEVEL_OUTOF10: 9

## 2018-06-19 NOTE — CARE PLAN
Problem: Bowel/Gastric:  Goal: Normal bowel function is maintained or improved  Outcome: PROGRESSING AS EXPECTED  Pts nausea improved a little this am. Pt was not able to tolerate contrast for exam. No BM as pt has not been eating very much. MD is aware. Diet modified to help with nausea.     Problem: Mobility  Goal: Risk for activity intolerance will decrease  Outcome: PROGRESSING AS EXPECTED  Pt ambulated to the wheelchair today without assistance. RN will encourage to get up and move a little more today.

## 2018-06-19 NOTE — PROGRESS NOTES
[facility] Hospitalist Progress Note    Date of Service: 2018    Chief Complaint  27 y.o. female admitted 2018 with recurrent DKA    Interval Problem Update    :  Transferred out of ICU  Mother at bedside  Tolerated some clears and erythromycin reports that she is feeling better  :  Vomited this a.m. Greenish liquid.  States unable to keep fluids down. Spoke with Dr. Gan, GI.  Ordered UGI with small bowel follow through. Continue IVFs until adequate po intake.                    Consultants/Specialty  Critical care    Disposition  Home with mother once medically cleared.        Review of Systems   Constitutional: Negative for chills and fever.   HENT: Negative.    Eyes: Negative for blurred vision, discharge and redness.   Respiratory: Negative for cough, hemoptysis, shortness of breath and wheezing.    Cardiovascular: Negative for chest pain, palpitations, leg swelling and PND.   Gastrointestinal: Positive for nausea and vomiting. Negative for blood in stool and melena.   Genitourinary: Negative.    Musculoskeletal: Negative for back pain and joint pain.   Skin: Negative.    Neurological: Negative.  Negative for weakness.   Psychiatric/Behavioral: Negative for hallucinations, memory loss and substance abuse. The patient is not nervous/anxious.    All other systems reviewed and are negative.     Physical Exam  Laboratory/Imaging   Hemodynamics  Temp (24hrs), Av.2 °C (97.1 °F), Min:36.1 °C (97 °F), Max:36.2 °C (97.2 °F)   Temperature: 36.1 °C (97 °F)  Pulse  Av.8  Min: 82  Max: 142    Blood Pressure: 150/92      Respiratory      Respiration: 16, Pulse Oximetry: 94 %        RUL Breath Sounds: Clear, RML Breath Sounds: Clear, RLL Breath Sounds: Diminished, NANCY Breath Sounds: Clear, LLL Breath Sounds: Diminished    Fluids    Intake/Output Summary (Last 24 hours) at 18 1906  Last data filed at 18 1615   Gross per 24 hour   Intake               15 ml   Output               85 ml   Net               -70 ml       Nutrition  Orders Placed This Encounter   Procedures   • DIET ORDER     Standing Status:   Standing     Number of Occurrences:   1     Order Specific Question:   Diet:     Answer:   Clear Liquids - No Red Foods [12]     Physical Exam   Constitutional: She is oriented to person, place, and time. She appears well-developed and well-nourished.   HENT:   Head: Normocephalic and atraumatic.   Mouth/Throat: Oropharynx is clear and moist. No oropharyngeal exudate.   Eyes: Conjunctivae are normal. Pupils are equal, round, and reactive to light. Right eye exhibits no discharge. Left eye exhibits no discharge. No scleral icterus.   Neck: Neck supple. No JVD present. No tracheal deviation present.   Cardiovascular: Normal rate and regular rhythm.  Exam reveals no gallop and no friction rub.    No murmur heard.  Pulmonary/Chest: Effort normal and breath sounds normal. No stridor. No respiratory distress. She exhibits no tenderness.   Abdominal: Soft. Bowel sounds are normal. She exhibits no distension. There is tenderness. There is no rebound.   Mild generalized tenderness   Musculoskeletal: She exhibits no edema or tenderness.   Neurological: She is alert and oriented to person, place, and time. No cranial nerve deficit. She exhibits normal muscle tone.   Skin: Skin is warm and dry. No rash noted. She is not diaphoretic. No cyanosis or erythema. Nails show no clubbing.   Psychiatric: She has a normal mood and affect. Her behavior is normal.   Nursing note and vitals reviewed.      Recent Labs      06/16/18   0403  06/17/18   0443   WBC  5.2  5.1   RBC  3.91*  3.87*   HEMOGLOBIN  11.7*  11.2*   HEMATOCRIT  34.1*  33.7*   MCV  87.2  87.1   MCH  29.9  28.9   MCHC  34.3  33.2*   RDW  41.3  40.3   PLATELETCT  210  133*   MPV  10.7  10.8     Recent Labs      06/16/18   0403  06/17/18   0443  06/18/18   0644   SODIUM  137  138  140   POTASSIUM  4.2  4.9  4.0   CHLORIDE  110  108  106   CO2  19*  21  26    GLUCOSE  208*  107*  112*   BUN  <3*  <3*  <3*   CREATININE  0.47*  0.38*  0.47*   CALCIUM  7.9*  7.9*  8.5                      Assessment/Plan     * Diabetic ketoacidosis (HCC)- (present on admission)   Assessment & Plan    Resolved    Monitor CBG's        Intractable nausea and vomiting- (present on admission)   Assessment & Plan      Likely secondary to gastritis/esophagitis and gastroparesis    Continue Reglan and Compazine  Continue IV Protonix  Continue Carafate  Continue hydration with IV fluids  6/18:  Reviewed records:  Recent EGD with dilation of pyloric stenosis by Dr. Black.  Pathology results negative.  Ordered UGI with small bowel follow through.        Gastroparesis due to DM (HCC)- (present on admission)   Assessment & Plan    Scheduled Reglan and Compazine  IV fluids for hydration  Erythromycin        Esophagitis- (present on admission)   Assessment & Plan    And gastritis as well as pyloric stenosis seen on EGD done on 6/9/18.  Continue Carafate and IV Protonix        Type 1 diabetes mellitus (HCC)- (present on admission)   Assessment & Plan    Uncontrolled with hyperglycemia   HbA1c 12.5    Improved controlled continue Lantus 23 units twice a day and sliding scale insulin and close monitoring              Hypomagnesemia   Assessment & Plan    4 g of magnesium sulfate  Monitor electrolytes        Pyloric stenosis   Assessment & Plan    Status post dilatation on 6/9.   Abdominal x-ray was negative  Discussed with Dr. Lopez from GI he recommended medical management and erythromycin to help with gastroparesis  6/18: Persistent emesis,  Reviewed with Dr. Gan, on call GI,  wants upper GI with small bowel follow through.  Depending on these results, please formally consult GI.         Anemia- (present on admission)   Assessment & Plan    No signs of bleeding hemoglobin overall stable          Quality-Core Measures   Reviewed items::  Labs reviewed, Medications reviewed and Radiology images  reviewed  Pitt catheter::  No Pitt  DVT prophylaxis pharmacological::  Enoxaparin (Lovenox)  Ulcer Prophylaxis::  Yes

## 2018-06-19 NOTE — PROGRESS NOTES
Assumed care of pt this am. A&O x4. RN titrated pt down to room air, pt o2 sat 95. Pt is continuing to refuse PO medication due to nausea. MD is aware. Diet and reglan orders modified by MD to improve appetite and pts ability to keep food down. No episodes of vomiting so far other than when pt was not able to keep contrast down today during her upper GI study. PRN pain medication administered upon pt's request.

## 2018-06-19 NOTE — CARE PLAN
Problem: Safety  Goal: Will remain free from falls  Outcome: PROGRESSING AS EXPECTED  Bed locked and in lowest position. Non-skid slipper socks on when ambulating. Patient verbalizes understanding and demonstrates proper use of call light. Hourly rounding in place.    Problem: Bowel/Gastric:  Goal: Normal bowel function is maintained or improved  Outcome: PROGRESSING SLOWER THAN EXPECTED  Patient nauseated with episodes of vomiting. Vomiting about  ml's each episode of clear/tan thin liquid. Continuing nausea medications scheduled and prn.

## 2018-06-20 LAB
ANION GAP SERPL CALC-SCNC: 9 MMOL/L (ref 0–11.9)
BASOPHILS # BLD AUTO: 0.8 % (ref 0–1.8)
BASOPHILS # BLD: 0.04 K/UL (ref 0–0.12)
BUN SERPL-MCNC: 5 MG/DL (ref 8–22)
CALCIUM SERPL-MCNC: 8.5 MG/DL (ref 8.5–10.5)
CHLORIDE SERPL-SCNC: 107 MMOL/L (ref 96–112)
CO2 SERPL-SCNC: 24 MMOL/L (ref 20–33)
CREAT SERPL-MCNC: 0.46 MG/DL (ref 0.5–1.4)
EOSINOPHIL # BLD AUTO: 0.14 K/UL (ref 0–0.51)
EOSINOPHIL NFR BLD: 2.6 % (ref 0–6.9)
ERYTHROCYTE [DISTWIDTH] IN BLOOD BY AUTOMATED COUNT: 39 FL (ref 35.9–50)
EST. AVERAGE GLUCOSE BLD GHB EST-MCNC: 206 MG/DL
GLUCOSE BLD-MCNC: 178 MG/DL (ref 65–99)
GLUCOSE BLD-MCNC: 182 MG/DL (ref 65–99)
GLUCOSE BLD-MCNC: 188 MG/DL (ref 65–99)
GLUCOSE BLD-MCNC: 215 MG/DL (ref 65–99)
GLUCOSE SERPL-MCNC: 122 MG/DL (ref 65–99)
HBA1C MFR BLD: 8.8 % (ref 0–5.6)
HCT VFR BLD AUTO: 38 % (ref 37–47)
HGB BLD-MCNC: 12.8 G/DL (ref 12–16)
IMM GRANULOCYTES # BLD AUTO: 0.02 K/UL (ref 0–0.11)
IMM GRANULOCYTES NFR BLD AUTO: 0.4 % (ref 0–0.9)
LYMPHOCYTES # BLD AUTO: 1.91 K/UL (ref 1–4.8)
LYMPHOCYTES NFR BLD: 36.1 % (ref 22–41)
MCH RBC QN AUTO: 29 PG (ref 27–33)
MCHC RBC AUTO-ENTMCNC: 33.7 G/DL (ref 33.6–35)
MCV RBC AUTO: 86 FL (ref 81.4–97.8)
MONOCYTES # BLD AUTO: 0.46 K/UL (ref 0–0.85)
MONOCYTES NFR BLD AUTO: 8.7 % (ref 0–13.4)
NEUTROPHILS # BLD AUTO: 2.72 K/UL (ref 2–7.15)
NEUTROPHILS NFR BLD: 51.4 % (ref 44–72)
NRBC # BLD AUTO: 0 K/UL
NRBC BLD-RTO: 0 /100 WBC
PLATELET # BLD AUTO: 222 K/UL (ref 164–446)
PMV BLD AUTO: 10.4 FL (ref 9–12.9)
POTASSIUM SERPL-SCNC: 4 MMOL/L (ref 3.6–5.5)
RBC # BLD AUTO: 4.42 M/UL (ref 4.2–5.4)
SODIUM SERPL-SCNC: 140 MMOL/L (ref 135–145)
WBC # BLD AUTO: 5.3 K/UL (ref 4.8–10.8)

## 2018-06-20 PROCEDURE — 83036 HEMOGLOBIN GLYCOSYLATED A1C: CPT

## 2018-06-20 PROCEDURE — 99232 SBSQ HOSP IP/OBS MODERATE 35: CPT | Performed by: INTERNAL MEDICINE

## 2018-06-20 PROCEDURE — 700111 HCHG RX REV CODE 636 W/ 250 OVERRIDE (IP): Performed by: FAMILY MEDICINE

## 2018-06-20 PROCEDURE — 700111 HCHG RX REV CODE 636 W/ 250 OVERRIDE (IP): Performed by: HOSPITALIST

## 2018-06-20 PROCEDURE — 770006 HCHG ROOM/CARE - MED/SURG/GYN SEMI*

## 2018-06-20 PROCEDURE — 80048 BASIC METABOLIC PNL TOTAL CA: CPT

## 2018-06-20 PROCEDURE — 700102 HCHG RX REV CODE 250 W/ 637 OVERRIDE(OP): Performed by: HOSPITALIST

## 2018-06-20 PROCEDURE — 700102 HCHG RX REV CODE 250 W/ 637 OVERRIDE(OP): Performed by: INTERNAL MEDICINE

## 2018-06-20 PROCEDURE — 700105 HCHG RX REV CODE 258: Performed by: INTERNAL MEDICINE

## 2018-06-20 PROCEDURE — 82962 GLUCOSE BLOOD TEST: CPT | Mod: 91

## 2018-06-20 PROCEDURE — 36415 COLL VENOUS BLD VENIPUNCTURE: CPT

## 2018-06-20 PROCEDURE — C9113 INJ PANTOPRAZOLE SODIUM, VIA: HCPCS | Performed by: HOSPITALIST

## 2018-06-20 PROCEDURE — A9270 NON-COVERED ITEM OR SERVICE: HCPCS | Performed by: INTERNAL MEDICINE

## 2018-06-20 PROCEDURE — 85025 COMPLETE CBC W/AUTO DIFF WBC: CPT

## 2018-06-20 PROCEDURE — 700111 HCHG RX REV CODE 636 W/ 250 OVERRIDE (IP): Performed by: INTERNAL MEDICINE

## 2018-06-20 RX ORDER — KETOROLAC TROMETHAMINE 30 MG/ML
30 INJECTION, SOLUTION INTRAMUSCULAR; INTRAVENOUS EVERY 6 HOURS PRN
Status: DISPENSED | OUTPATIENT
Start: 2018-06-20 | End: 2018-06-25

## 2018-06-20 RX ORDER — ERYTHROMYCIN ETHYLSUCCINATE 200 MG/5ML
500 SUSPENSION ORAL 3 TIMES DAILY
Status: DISCONTINUED | OUTPATIENT
Start: 2018-06-20 | End: 2018-06-22

## 2018-06-20 RX ORDER — MORPHINE SULFATE 4 MG/ML
3 INJECTION, SOLUTION INTRAMUSCULAR; INTRAVENOUS EVERY 4 HOURS PRN
Status: DISCONTINUED | OUTPATIENT
Start: 2018-06-20 | End: 2018-06-28

## 2018-06-20 RX ADMIN — INSULIN HUMAN 2 UNITS: 100 INJECTION, SOLUTION PARENTERAL at 21:07

## 2018-06-20 RX ADMIN — MORPHINE SULFATE 4 MG: 4 INJECTION INTRAVENOUS at 08:58

## 2018-06-20 RX ADMIN — INSULIN HUMAN 3 UNITS: 100 INJECTION, SOLUTION PARENTERAL at 08:56

## 2018-06-20 RX ADMIN — SODIUM CHLORIDE, POTASSIUM CHLORIDE, SODIUM LACTATE AND CALCIUM CHLORIDE: 600; 310; 30; 20 INJECTION, SOLUTION INTRAVENOUS at 04:17

## 2018-06-20 RX ADMIN — ERYTHROMYCIN ETHYLSUCCINATE 500 MG: 200 SUSPENSION ORAL at 16:09

## 2018-06-20 RX ADMIN — PROCHLORPERAZINE EDISYLATE 5 MG: 5 INJECTION INTRAMUSCULAR; INTRAVENOUS at 06:24

## 2018-06-20 RX ADMIN — ENOXAPARIN SODIUM 40 MG: 100 INJECTION SUBCUTANEOUS at 21:05

## 2018-06-20 RX ADMIN — PANTOPRAZOLE SODIUM 40 MG: 40 INJECTION, POWDER, LYOPHILIZED, FOR SOLUTION INTRAVENOUS at 09:01

## 2018-06-20 RX ADMIN — INSULIN HUMAN 2 UNITS: 100 INJECTION, SOLUTION PARENTERAL at 17:55

## 2018-06-20 RX ADMIN — METOCLOPRAMIDE 10 MG: 5 INJECTION, SOLUTION INTRAMUSCULAR; INTRAVENOUS at 15:53

## 2018-06-20 RX ADMIN — MORPHINE SULFATE 3 MG: 4 INJECTION INTRAVENOUS at 15:47

## 2018-06-20 RX ADMIN — PANTOPRAZOLE SODIUM 40 MG: 40 INJECTION, POWDER, LYOPHILIZED, FOR SOLUTION INTRAVENOUS at 21:06

## 2018-06-20 RX ADMIN — MORPHINE SULFATE 3 MG: 4 INJECTION INTRAVENOUS at 21:00

## 2018-06-20 RX ADMIN — MORPHINE SULFATE 4 MG: 4 INJECTION INTRAVENOUS at 04:34

## 2018-06-20 RX ADMIN — PROCHLORPERAZINE EDISYLATE 5 MG: 5 INJECTION INTRAMUSCULAR; INTRAVENOUS at 17:52

## 2018-06-20 RX ADMIN — METOCLOPRAMIDE 10 MG: 5 INJECTION, SOLUTION INTRAMUSCULAR; INTRAVENOUS at 06:24

## 2018-06-20 RX ADMIN — LIDOCAINE HYDROCHLORIDE 30 ML: 20 SOLUTION OROPHARYNGEAL at 15:47

## 2018-06-20 RX ADMIN — PROCHLORPERAZINE EDISYLATE 5 MG: 5 INJECTION INTRAMUSCULAR; INTRAVENOUS at 23:10

## 2018-06-20 RX ADMIN — INSULIN HUMAN 2 UNITS: 100 INJECTION, SOLUTION PARENTERAL at 12:41

## 2018-06-20 RX ADMIN — ONDANSETRON 4 MG: 2 INJECTION INTRAMUSCULAR; INTRAVENOUS at 04:12

## 2018-06-20 RX ADMIN — METOCLOPRAMIDE 10 MG: 5 INJECTION, SOLUTION INTRAMUSCULAR; INTRAVENOUS at 21:05

## 2018-06-20 RX ADMIN — METOCLOPRAMIDE 10 MG: 5 INJECTION, SOLUTION INTRAMUSCULAR; INTRAVENOUS at 11:20

## 2018-06-20 ASSESSMENT — ENCOUNTER SYMPTOMS
CHILLS: 0
EYE REDNESS: 0
VOMITING: 1
SHORTNESS OF BREATH: 0
MEMORY LOSS: 0
FEVER: 0
PALPITATIONS: 0
COUGH: 0
BLURRED VISION: 0
NEUROLOGICAL NEGATIVE: 1
BLOOD IN STOOL: 0
EYE DISCHARGE: 0
BACK PAIN: 0
NAUSEA: 1
WHEEZING: 0
HALLUCINATIONS: 0
HEMOPTYSIS: 0
PND: 0
NERVOUS/ANXIOUS: 0
WEAKNESS: 0

## 2018-06-20 ASSESSMENT — PAIN SCALES - GENERAL
PAINLEVEL_OUTOF10: 5
PAINLEVEL_OUTOF10: 9
PAINLEVEL_OUTOF10: 8
PAINLEVEL_OUTOF10: 8

## 2018-06-20 ASSESSMENT — LIFESTYLE VARIABLES: SUBSTANCE_ABUSE: 0

## 2018-06-20 NOTE — CARE PLAN
Problem: Pain Management  Goal: Pain level will decrease to patient's comfort goal    Intervention: Educate and implement non-pharmacologic comfort measures. Examples: relaxation, distration, play therapy, activity therapy, massage, etc.   06/20/18 0815   OTHER   Intervention Medication (see MAR);Rest;Repositioned;Relaxation Technique         Problem: Skin Integrity  Goal: Risk for impaired skin integrity will decrease    Intervention: Implement precautions to protect skin integrity in collaboration with the interdisciplinary team   06/19/18 2010 06/20/18 0815   OTHER   Skin Preventative Measures --  Pillows in Use for Support / Positioning   Moisturizers --  Moisturizer    PT / OT Involved in Care --  Order has been Placed   Activity  Bed;Up to bathroom --    Patient Turns / Repositioning --  Patient Turns Self from Side to Side   Assistance / Tolerance for Turning/Repositioning --  Tolerates Well   Patient is Receiving Nutrition --  Oral Intake Adequate   Nutrition Consult Ordered --  No, Consult has Already been Placed   Vitamin Therapy in Use --  No

## 2018-06-20 NOTE — DIETARY
Nutrition Services Update: following for adequate nutritional intake    26 yo F on day 7 of admit. Diet advanced to full liquids yesterday afternoon.     Pt was on CLD x 6 days prior to diet advancement.   Per review of recent progress notes pt still c/o pain and had 3 episodes of emesis last night.   <25% consumed of breakfast tray this morning.   Although diet has advanced, pt continues to have adequate nutrition since admit.   Upper GI series was ordered, however pt was unable to tolerate full contrast dose.   Reglan and compazine in MAR as well as PRN Zofran.    Plan/Recommend:   1. Full liquid diet as tolerated - encourage PO intake  2. Please record PO intake as % meals consumed in ADLs  3. Noted consult for DM diet education, however given pt's inability to tolerate meals at this time will follow up closer to d/c when pt is more appropriate for education  4. RD will monitor for adequate PO intake, diet advancement, wt trends, and nutrition labs/meds - will follow up for diet education when pt is appropriate to be receptive

## 2018-06-20 NOTE — PROGRESS NOTES
Report received from day shift nurse. Assumed care @ 1915.     Patient is alert and oriented x 4. Able to make needs known. Assessment completed. On room air, tolerating well. Denies any pain at this time. Complained of nausea, scheduled nausea medication given. x3 episodes of vomiting per pt. Patient educated regarding plan of care. Instructed to call for assistance.    Bed locked, in lowest position, treaded socks on. Needs attended. No further needs at this time. Communication board updated. Call light and personal belongings within reach.

## 2018-06-20 NOTE — PROGRESS NOTES
[facility]ist Progress Note    Date of Service: 2018    Chief Complaint  27 y.o. female admitted 2018 with recurrent DKA    Interval Problem Update    :  Transferred out of ICU  Mother at bedside  Tolerated some clears and erythromycin reports that she is feeling better  :  Vomited this a.m. Greenish liquid.  States unable to keep fluids down. Spoke with Dr. Gan, GI.  Ordered UGI with small bowel follow through. Continue IVFs until adequate po intake.    :  Upper GI Series unable to complete fully due the patient not able to tolerate the full contrast dose and vomited up the contrast fluid.  The patient reports still feeling nauseous.  Advised nursing staff to advance diet to full diet because the patient has been on liquid diet in the last 3 days which is just mainly fruit juice with high acidity.        Consultants/Specialty  Critical care    Disposition  Home with mother once medically cleared.        Review of Systems   Constitutional: Negative for chills and fever.   HENT: Negative.    Eyes: Negative for blurred vision, discharge and redness.   Respiratory: Negative for cough, hemoptysis, shortness of breath and wheezing.    Cardiovascular: Negative for chest pain, palpitations, leg swelling and PND.   Gastrointestinal: Positive for nausea and vomiting. Negative for blood in stool and melena.   Genitourinary: Negative.    Musculoskeletal: Negative for back pain and joint pain.   Skin: Negative.    Neurological: Negative.  Negative for weakness.   Psychiatric/Behavioral: Negative for hallucinations, memory loss and substance abuse. The patient is not nervous/anxious.    All other systems reviewed and are negative.     Physical Exam  Laboratory/Imaging   Hemodynamics  Temp (24hrs), Av.4 °C (97.6 °F), Min:36.3 °C (97.3 °F), Max:36.7 °C (98.1 °F)   Temperature: 36.4 °C (97.5 °F)  Pulse  Av.3  Min: 60  Max: 142    Blood Pressure: 106/63      Respiratory      Respiration: 18,  Pulse Oximetry: 99 %        RUL Breath Sounds: Clear, RML Breath Sounds: Clear, RLL Breath Sounds: Diminished, NANCY Breath Sounds: Clear, LLL Breath Sounds: Diminished    Fluids    Intake/Output Summary (Last 24 hours) at 06/19/18 1857  Last data filed at 06/19/18 1116   Gross per 24 hour   Intake              200 ml   Output              450 ml   Net             -250 ml       Nutrition  Orders Placed This Encounter   Procedures   • DIET ORDER     Standing Status:   Standing     Number of Occurrences:   1     Order Specific Question:   Diet:     Answer:   Full Liquid [11]     Physical Exam   Constitutional: She is oriented to person, place, and time. She appears well-developed and well-nourished.   HENT:   Head: Normocephalic and atraumatic.   Mouth/Throat: Oropharynx is clear and moist. No oropharyngeal exudate.   Eyes: Conjunctivae are normal. Pupils are equal, round, and reactive to light. Right eye exhibits no discharge. Left eye exhibits no discharge. No scleral icterus.   Neck: Neck supple. No JVD present. No tracheal deviation present.   Cardiovascular: Normal rate and regular rhythm.  Exam reveals no gallop and no friction rub.    No murmur heard.  Pulmonary/Chest: Effort normal and breath sounds normal. No stridor. No respiratory distress. She exhibits no tenderness.   Abdominal: Soft. Bowel sounds are normal. She exhibits no distension. There is tenderness. There is no rebound.   Mild generalized tenderness   Musculoskeletal: She exhibits no edema or tenderness.   Neurological: She is alert and oriented to person, place, and time. No cranial nerve deficit. She exhibits normal muscle tone.   Skin: Skin is warm and dry. No rash noted. She is not diaphoretic. No cyanosis or erythema. Nails show no clubbing.   Psychiatric: She has a normal mood and affect. Her behavior is normal.   Nursing note and vitals reviewed.      Recent Labs      06/17/18   0443  06/19/18   0231   WBC  5.1  6.1   RBC  3.87*  4.49    HEMOGLOBIN  11.2*  13.0   HEMATOCRIT  33.7*  38.5   MCV  87.1  85.7   MCH  28.9  29.0   MCHC  33.2*  33.8   RDW  40.3  39.2   PLATELETCT  133*  210   MPV  10.8  10.7     Recent Labs      06/17/18   0443  06/18/18   0644  06/19/18   0231   SODIUM  138  140  141   POTASSIUM  4.9  4.0  3.6   CHLORIDE  108  106  106   CO2  21  26  26   GLUCOSE  107*  112*  72   BUN  <3*  <3*  3*   CREATININE  0.38*  0.47*  0.42*   CALCIUM  7.9*  8.5  9.0                      Assessment/Plan     * Diabetic ketoacidosis (HCC)- (present on admission)   Assessment & Plan    Resolved    Last Hgb A1C check was back in April.  Level above 12 indicates very poor control.   Will repeat Hgb A 1C check.        Intractable nausea and vomiting- (present on admission)   Assessment & Plan      Likely secondary to gastritis/esophagitis and gastroparesis    Continue Reglan and Compazine  Continue IV Protonix  Continue Carafate  Continue hydration with IV fluids  6/18:  Reviewed records:  Recent EGD with dilation of pyloric stenosis by Dr. Black.  Pathology results negative.  Ordered UGI with small bowel follow through.  6/19:   Change reglan to be given before each meal and at bed time.        Gastroparesis due to DM (HCC)- (present on admission)   Assessment & Plan    Scheduled Reglan with each meal and Compazine  IV fluids for hydration  Discussed with the patient regarding the importance of diabetic control to prevent further exacerbation.  Advance diet as tolerated.  May need to consider Erythromycin if no improvement.        Esophagitis- (present on admission)   Assessment & Plan    And gastritis as well as pyloric stenosis seen on EGD done on 6/9/18.  Continue Carafate and IV Protonix        Type 1 diabetes mellitus (HCC)- (present on admission)   Assessment & Plan    Uncontrolled with hyperglycemia   HbA1c 12.5    Improved controlled continue Lantus 23 units twice a day and sliding scale insulin and close monitoring               Hypomagnesemia   Assessment & Plan    4 g of magnesium sulfate  Monitor electrolytes        Pyloric stenosis   Assessment & Plan    Status post dilatation on 6/9.   Abdominal x-ray was negative  Discussed with Dr. Lopez from GI he recommended medical management and erythromycin to help with gastroparesis  6/18: Persistent emesis,  Reviewed with Dr. Gan, on call GI,  wants upper GI with small bowel follow through.  Depending on these results, please formally consult GI.   6/19:  No pyloric stenosis seen on the patient limited upper gi series.        Anemia- (present on admission)   Assessment & Plan    No signs of bleeding hemoglobin overall stable          Quality-Core Measures   Reviewed items::  Labs reviewed, Medications reviewed and Radiology images reviewed  Pitt catheter::  No Pitt  DVT prophylaxis pharmacological::  Enoxaparin (Lovenox)  Ulcer Prophylaxis::  Yes

## 2018-06-20 NOTE — DISCHARGE PLANNING
Anticipated Discharge Disposition: Home    Action: Recently d/c on 6/11/18, was admitted for same as this admit. Patient lives with parents    Barriers to Discharge: medical clearance    Plan: continue monitoring for SW needs

## 2018-06-20 NOTE — PROGRESS NOTES
Patient refusing bed alarm. A&OX4. Able to ambulate self with steady gait. Education provided but continues to refuse. Verbalized understanding. Calls appropriately. Charge nurse notified.

## 2018-06-20 NOTE — CARE PLAN
Problem: Nutritional:  Goal: Achieve adequate nutritional intake  Patient will advance diet past clears and consume >50% of meals   Outcome: PROGRESSING AS EXPECTED    Intervention: Advance diet as tolerated  Diet was advanced from clear liquid to full liquid.     RD to continue following

## 2018-06-20 NOTE — CARE PLAN
Problem: Pain Management  Goal: Pain level will decrease to patient's comfort goal  Outcome: PROGRESSING AS EXPECTED  Patient complained of pain. PRN pain medication given. Patient on bed resting comfortably. Will continue to monitor pain q 2 hrs.

## 2018-06-20 NOTE — PROGRESS NOTES
[facility]ist Progress Note    Date of Service: 6/20/2018    Chief Complaint  27 y.o. female admitted 6/13/2018 with recurrent DKA    Interval Problem Update    6/17:  Transferred out of ICU  Mother at bedside  Tolerated some clears and erythromycin reports that she is feeling better  6/18:  Vomited this a.m. Greenish liquid.  States unable to keep fluids down. Spoke with Dr. Gan, GI.  Ordered UGI with small bowel follow through. Continue IVFs until adequate po intake.    6/19:  Upper GI Series unable to complete fully due the patient not able to tolerate the full contrast dose and vomited up the contrast fluid.  The patient reports still feeling nauseous.  Advised nursing staff to advance diet to full diet because the patient has been on liquid diet in the last 3 days which is just mainly fruit juice with high acidity.  6/20:  The patient still had several nausea and vomiting episode through out last night and unable to tolerate much diet.   I have discussed with the patient that getting opioid medication will actually worsen her gastroparesis.   Will wean down on morphine and start given Toradol for pain control.      Consultants/Specialty  Critical care    Disposition  Home with mother once medically cleared.        Review of Systems   Constitutional: Negative for chills and fever.   HENT: Negative.    Eyes: Negative for blurred vision, discharge and redness.   Respiratory: Negative for cough, hemoptysis, shortness of breath and wheezing.    Cardiovascular: Negative for chest pain, palpitations, leg swelling and PND.   Gastrointestinal: Positive for nausea and vomiting. Negative for blood in stool and melena.   Genitourinary: Negative.    Musculoskeletal: Negative for back pain and joint pain.   Skin: Negative.    Neurological: Negative.  Negative for weakness.   Psychiatric/Behavioral: Negative for hallucinations, memory loss and substance abuse. The patient is not nervous/anxious.    All other systems  reviewed and are negative.     Physical Exam  Laboratory/Imaging   Hemodynamics  Temp (24hrs), Av.5 °C (97.7 °F), Min:36.1 °C (96.9 °F), Max:37 °C (98.6 °F)   Temperature: 36.1 °C (96.9 °F)  Pulse  Av.1  Min: 60  Max: 142    Blood Pressure: 124/79      Respiratory      Respiration: 16, Pulse Oximetry: 95 %        RUL Breath Sounds: Clear, RML Breath Sounds: Clear, RLL Breath Sounds: Diminished, NANCY Breath Sounds: Clear, LLL Breath Sounds: Diminished    Fluids    Intake/Output Summary (Last 24 hours) at 18 0931  Last data filed at 18 0900   Gross per 24 hour   Intake             1243 ml   Output                0 ml   Net             1243 ml       Nutrition  Orders Placed This Encounter   Procedures   • DIET ORDER     Standing Status:   Standing     Number of Occurrences:   1     Order Specific Question:   Diet:     Answer:   Full Liquid [11]     Physical Exam   Constitutional: She is oriented to person, place, and time. She appears well-developed and well-nourished.   HENT:   Head: Normocephalic and atraumatic.   Mouth/Throat: Oropharynx is clear and moist. No oropharyngeal exudate.   Eyes: Conjunctivae are normal. Pupils are equal, round, and reactive to light. Right eye exhibits no discharge. Left eye exhibits no discharge. No scleral icterus.   Neck: Neck supple. No JVD present. No tracheal deviation present.   Cardiovascular: Normal rate and regular rhythm.  Exam reveals no gallop and no friction rub.    No murmur heard.  Pulmonary/Chest: Effort normal and breath sounds normal. No stridor. No respiratory distress. She exhibits no tenderness.   Abdominal: Soft. Bowel sounds are normal. She exhibits no distension. There is tenderness. There is no rebound.   Mild generalized tenderness   Musculoskeletal: She exhibits no edema or tenderness.   Neurological: She is alert and oriented to person, place, and time. No cranial nerve deficit. She exhibits normal muscle tone.   Skin: Skin is warm and  dry. No rash noted. She is not diaphoretic. No cyanosis or erythema. Nails show no clubbing.   Psychiatric: She has a normal mood and affect. Her behavior is normal.   Nursing note and vitals reviewed.      Recent Labs      06/19/18   0231  06/20/18   0427   WBC  6.1  5.3   RBC  4.49  4.42   HEMOGLOBIN  13.0  12.8   HEMATOCRIT  38.5  38.0   MCV  85.7  86.0   MCH  29.0  29.0   MCHC  33.8  33.7   RDW  39.2  39.0   PLATELETCT  210  222   MPV  10.7  10.4     Recent Labs      06/18/18   0644  06/19/18   0231  06/20/18   0227   SODIUM  140  141  140   POTASSIUM  4.0  3.6  4.0   CHLORIDE  106  106  107   CO2  26  26  24   GLUCOSE  112*  72  122*   BUN  <3*  3*  5*   CREATININE  0.47*  0.42*  0.46*   CALCIUM  8.5  9.0  8.5                      Assessment/Plan     * Diabetic ketoacidosis (HCC)- (present on admission)   Assessment & Plan    Resolved    Latest Hgb A1C is 8.8.        Intractable nausea and vomiting- (present on admission)   Assessment & Plan      Likely secondary to gastritis/esophagitis and gastroparesis    Continue Reglan and Compazine  Continue IV Protonix  Continue Carafate  Continue hydration with IV fluids  6/18:  Reviewed records:  Recent EGD with dilation of pyloric stenosis by Dr. Black.  Pathology results negative.  Ordered UGI with small bowel follow through.  6/19:   Change reglan to be given before each meal and at bed time.  6/20:  Adding erythromycin oral solution three times daily.  No IV erythromycin available per pharmacist.  Give GI cocktail.  Modified diet to avoid acidic food or drink.  Reportedly, dietary has been giving the patient Apple juice and tomato soup.        Gastroparesis due to DM (HCC)- (present on admission)   Assessment & Plan    Scheduled Reglan with each meal and Compazine  IV fluids for hydration  Discussed with the patient regarding the importance of diabetic control to prevent further exacerbation.  Advance diet as tolerated.  Start  Erythromycin.  Discussed with the  patient that morphine can make her condition worse.  Start Toradol.        Esophagitis- (present on admission)   Assessment & Plan    And gastritis as well as pyloric stenosis seen on EGD done on 6/9/18.  Continue Carafate and IV Protonix        Type 1 diabetes mellitus (HCC)- (present on admission)   Assessment & Plan    Uncontrolled with hyperglycemia   Previously HbA1c 12.5, now 8.8.    Improved controlled continue Lantus 23 units twice a day and sliding scale insulin and close monitoring        Hypomagnesemia   Assessment & Plan    4 g of magnesium sulfate  Monitor electrolytes        Pyloric stenosis   Assessment & Plan    Status post dilatation on 6/9.   Abdominal x-ray was negative  Discussed with Dr. Lopez from GI he recommended medical management and erythromycin to help with gastroparesis  6/18: Persistent emesis,  Reviewed with Dr. Gan, on call GI,  wants upper GI with small bowel follow through.  Depending on these results, please formally consult GI.   6/19:  No pyloric stenosis seen on the patient limited upper gi series.        Anemia- (present on admission)   Assessment & Plan    No signs of bleeding hemoglobin overall stable          Quality-Core Measures   Reviewed items::  Labs reviewed, Medications reviewed and Radiology images reviewed  Pitt catheter::  No Pitt  DVT prophylaxis pharmacological::  Enoxaparin (Lovenox)  Ulcer Prophylaxis::  Yes

## 2018-06-21 LAB
ANION GAP SERPL CALC-SCNC: 13 MMOL/L (ref 0–11.9)
BASOPHILS # BLD AUTO: 1.2 % (ref 0–1.8)
BASOPHILS # BLD: 0.06 K/UL (ref 0–0.12)
BUN SERPL-MCNC: 6 MG/DL (ref 8–22)
CALCIUM SERPL-MCNC: 8.6 MG/DL (ref 8.5–10.5)
CHLORIDE SERPL-SCNC: 104 MMOL/L (ref 96–112)
CO2 SERPL-SCNC: 22 MMOL/L (ref 20–33)
CREAT SERPL-MCNC: 0.45 MG/DL (ref 0.5–1.4)
CRP SERPL HS-MCNC: 0.97 MG/DL (ref 0–0.75)
EOSINOPHIL # BLD AUTO: 0.12 K/UL (ref 0–0.51)
EOSINOPHIL NFR BLD: 2.5 % (ref 0–6.9)
ERYTHROCYTE [DISTWIDTH] IN BLOOD BY AUTOMATED COUNT: 38 FL (ref 35.9–50)
GLUCOSE BLD-MCNC: 171 MG/DL (ref 65–99)
GLUCOSE BLD-MCNC: 172 MG/DL (ref 65–99)
GLUCOSE BLD-MCNC: 178 MG/DL (ref 65–99)
GLUCOSE BLD-MCNC: 195 MG/DL (ref 65–99)
GLUCOSE BLD-MCNC: 237 MG/DL (ref 65–99)
GLUCOSE BLD-MCNC: 253 MG/DL (ref 65–99)
GLUCOSE SERPL-MCNC: 149 MG/DL (ref 65–99)
HCT VFR BLD AUTO: 35.2 % (ref 37–47)
HGB BLD-MCNC: 12.1 G/DL (ref 12–16)
IMM GRANULOCYTES # BLD AUTO: 0.02 K/UL (ref 0–0.11)
IMM GRANULOCYTES NFR BLD AUTO: 0.4 % (ref 0–0.9)
LACTATE BLD-SCNC: 0.8 MMOL/L (ref 0.5–2)
LIPASE SERPL-CCNC: 42 U/L (ref 11–82)
LYMPHOCYTES # BLD AUTO: 1.59 K/UL (ref 1–4.8)
LYMPHOCYTES NFR BLD: 33 % (ref 22–41)
MAGNESIUM SERPL-MCNC: 1.5 MG/DL (ref 1.5–2.5)
MCH RBC QN AUTO: 28.6 PG (ref 27–33)
MCHC RBC AUTO-ENTMCNC: 34.4 G/DL (ref 33.6–35)
MCV RBC AUTO: 83.2 FL (ref 81.4–97.8)
MONOCYTES # BLD AUTO: 0.37 K/UL (ref 0–0.85)
MONOCYTES NFR BLD AUTO: 7.7 % (ref 0–13.4)
NEUTROPHILS # BLD AUTO: 2.66 K/UL (ref 2–7.15)
NEUTROPHILS NFR BLD: 55.2 % (ref 44–72)
NRBC # BLD AUTO: 0 K/UL
NRBC BLD-RTO: 0 /100 WBC
PLATELET # BLD AUTO: 200 K/UL (ref 164–446)
PMV BLD AUTO: 10.8 FL (ref 9–12.9)
POTASSIUM SERPL-SCNC: 3.5 MMOL/L (ref 3.6–5.5)
RBC # BLD AUTO: 4.23 M/UL (ref 4.2–5.4)
SODIUM SERPL-SCNC: 139 MMOL/L (ref 135–145)
WBC # BLD AUTO: 4.8 K/UL (ref 4.8–10.8)

## 2018-06-21 PROCEDURE — 700111 HCHG RX REV CODE 636 W/ 250 OVERRIDE (IP): Performed by: INTERNAL MEDICINE

## 2018-06-21 PROCEDURE — 700101 HCHG RX REV CODE 250: Performed by: INTERNAL MEDICINE

## 2018-06-21 PROCEDURE — C9113 INJ PANTOPRAZOLE SODIUM, VIA: HCPCS | Performed by: HOSPITALIST

## 2018-06-21 PROCEDURE — 700102 HCHG RX REV CODE 250 W/ 637 OVERRIDE(OP): Performed by: INTERNAL MEDICINE

## 2018-06-21 PROCEDURE — 99232 SBSQ HOSP IP/OBS MODERATE 35: CPT | Performed by: INTERNAL MEDICINE

## 2018-06-21 PROCEDURE — 83605 ASSAY OF LACTIC ACID: CPT

## 2018-06-21 PROCEDURE — 700102 HCHG RX REV CODE 250 W/ 637 OVERRIDE(OP): Performed by: HOSPITALIST

## 2018-06-21 PROCEDURE — 700102 HCHG RX REV CODE 250 W/ 637 OVERRIDE(OP): Performed by: FAMILY MEDICINE

## 2018-06-21 PROCEDURE — 770006 HCHG ROOM/CARE - MED/SURG/GYN SEMI*

## 2018-06-21 PROCEDURE — 700111 HCHG RX REV CODE 636 W/ 250 OVERRIDE (IP): Performed by: FAMILY MEDICINE

## 2018-06-21 PROCEDURE — 86140 C-REACTIVE PROTEIN: CPT

## 2018-06-21 PROCEDURE — 82962 GLUCOSE BLOOD TEST: CPT | Mod: 91

## 2018-06-21 PROCEDURE — 83735 ASSAY OF MAGNESIUM: CPT

## 2018-06-21 PROCEDURE — A9270 NON-COVERED ITEM OR SERVICE: HCPCS | Performed by: INTERNAL MEDICINE

## 2018-06-21 PROCEDURE — A9270 NON-COVERED ITEM OR SERVICE: HCPCS | Performed by: FAMILY MEDICINE

## 2018-06-21 PROCEDURE — 83690 ASSAY OF LIPASE: CPT

## 2018-06-21 PROCEDURE — 700111 HCHG RX REV CODE 636 W/ 250 OVERRIDE (IP): Performed by: HOSPITALIST

## 2018-06-21 PROCEDURE — 700105 HCHG RX REV CODE 258: Performed by: INTERNAL MEDICINE

## 2018-06-21 PROCEDURE — 85025 COMPLETE CBC W/AUTO DIFF WBC: CPT

## 2018-06-21 PROCEDURE — 80048 BASIC METABOLIC PNL TOTAL CA: CPT

## 2018-06-21 PROCEDURE — 36415 COLL VENOUS BLD VENIPUNCTURE: CPT

## 2018-06-21 RX ORDER — SODIUM CHLORIDE AND POTASSIUM CHLORIDE 150; 450 MG/100ML; MG/100ML
INJECTION, SOLUTION INTRAVENOUS CONTINUOUS
Status: DISCONTINUED | OUTPATIENT
Start: 2018-06-21 | End: 2018-06-22

## 2018-06-21 RX ORDER — DIPHENHYDRAMINE HYDROCHLORIDE 50 MG/ML
25 INJECTION INTRAMUSCULAR; INTRAVENOUS EVERY 6 HOURS
Status: DISCONTINUED | OUTPATIENT
Start: 2018-06-21 | End: 2018-06-22

## 2018-06-21 RX ORDER — DIPHENHYDRAMINE HYDROCHLORIDE 50 MG/ML
50 INJECTION INTRAMUSCULAR; INTRAVENOUS ONCE
Status: COMPLETED | OUTPATIENT
Start: 2018-06-21 | End: 2018-06-21

## 2018-06-21 RX ORDER — OLANZAPINE 5 MG/1
2.5 TABLET ORAL EVERY EVENING
Status: DISCONTINUED | OUTPATIENT
Start: 2018-06-21 | End: 2018-06-22

## 2018-06-21 RX ORDER — INSULIN GLARGINE 100 [IU]/ML
10 INJECTION, SOLUTION SUBCUTANEOUS 2 TIMES DAILY
Status: DISCONTINUED | OUTPATIENT
Start: 2018-06-21 | End: 2018-06-23

## 2018-06-21 RX ADMIN — MORPHINE SULFATE 3 MG: 4 INJECTION INTRAVENOUS at 12:05

## 2018-06-21 RX ADMIN — INSULIN HUMAN 2 UNITS: 100 INJECTION, SOLUTION PARENTERAL at 12:05

## 2018-06-21 RX ADMIN — INSULIN GLARGINE 23 UNITS: 100 INJECTION, SOLUTION SUBCUTANEOUS at 09:47

## 2018-06-21 RX ADMIN — INSULIN GLARGINE 10 UNITS: 100 INJECTION, SOLUTION SUBCUTANEOUS at 20:44

## 2018-06-21 RX ADMIN — POTASSIUM CHLORIDE AND SODIUM CHLORIDE: 450; 150 INJECTION, SOLUTION INTRAVENOUS at 21:08

## 2018-06-21 RX ADMIN — PROCHLORPERAZINE EDISYLATE 5 MG: 5 INJECTION INTRAMUSCULAR; INTRAVENOUS at 23:21

## 2018-06-21 RX ADMIN — DIPHENHYDRAMINE HYDROCHLORIDE 50 MG: 50 INJECTION, SOLUTION INTRAMUSCULAR; INTRAVENOUS at 11:47

## 2018-06-21 RX ADMIN — DIPHENHYDRAMINE HYDROCHLORIDE 25 MG: 50 INJECTION, SOLUTION INTRAMUSCULAR; INTRAVENOUS at 23:20

## 2018-06-21 RX ADMIN — ONDANSETRON 4 MG: 2 INJECTION INTRAMUSCULAR; INTRAVENOUS at 20:32

## 2018-06-21 RX ADMIN — KETOROLAC TROMETHAMINE 30 MG: 30 INJECTION, SOLUTION INTRAMUSCULAR at 20:50

## 2018-06-21 RX ADMIN — METOCLOPRAMIDE 10 MG: 5 INJECTION, SOLUTION INTRAMUSCULAR; INTRAVENOUS at 17:23

## 2018-06-21 RX ADMIN — OLANZAPINE 2.5 MG: 5 TABLET, FILM COATED ORAL at 20:51

## 2018-06-21 RX ADMIN — PANTOPRAZOLE SODIUM 40 MG: 40 INJECTION, POWDER, LYOPHILIZED, FOR SOLUTION INTRAVENOUS at 09:47

## 2018-06-21 RX ADMIN — INSULIN HUMAN 2 UNITS: 100 INJECTION, SOLUTION PARENTERAL at 06:43

## 2018-06-21 RX ADMIN — INSULIN HUMAN 5 UNITS: 100 INJECTION, SOLUTION PARENTERAL at 21:03

## 2018-06-21 RX ADMIN — SODIUM CHLORIDE, POTASSIUM CHLORIDE, SODIUM LACTATE AND CALCIUM CHLORIDE: 600; 310; 30; 20 INJECTION, SOLUTION INTRAVENOUS at 06:27

## 2018-06-21 RX ADMIN — ENOXAPARIN SODIUM 40 MG: 100 INJECTION SUBCUTANEOUS at 20:42

## 2018-06-21 RX ADMIN — POTASSIUM CHLORIDE AND SODIUM CHLORIDE: 450; 150 INJECTION, SOLUTION INTRAVENOUS at 13:23

## 2018-06-21 RX ADMIN — SUCRALFATE 1 G: 1 SUSPENSION ORAL at 17:23

## 2018-06-21 RX ADMIN — ONDANSETRON 4 MG: 2 INJECTION INTRAMUSCULAR; INTRAVENOUS at 01:49

## 2018-06-21 RX ADMIN — METOCLOPRAMIDE 10 MG: 5 INJECTION, SOLUTION INTRAMUSCULAR; INTRAVENOUS at 06:26

## 2018-06-21 RX ADMIN — DIPHENHYDRAMINE HYDROCHLORIDE 25 MG: 50 INJECTION, SOLUTION INTRAMUSCULAR; INTRAVENOUS at 17:22

## 2018-06-21 RX ADMIN — METOCLOPRAMIDE 10 MG: 5 INJECTION, SOLUTION INTRAMUSCULAR; INTRAVENOUS at 20:40

## 2018-06-21 RX ADMIN — ONDANSETRON 4 MG: 2 INJECTION INTRAMUSCULAR; INTRAVENOUS at 09:59

## 2018-06-21 RX ADMIN — MORPHINE SULFATE 3 MG: 4 INJECTION INTRAVENOUS at 06:36

## 2018-06-21 RX ADMIN — METOCLOPRAMIDE 10 MG: 5 INJECTION, SOLUTION INTRAMUSCULAR; INTRAVENOUS at 11:47

## 2018-06-21 RX ADMIN — MORPHINE SULFATE 3 MG: 4 INJECTION INTRAVENOUS at 20:45

## 2018-06-21 RX ADMIN — PANTOPRAZOLE SODIUM 40 MG: 40 INJECTION, POWDER, LYOPHILIZED, FOR SOLUTION INTRAVENOUS at 20:35

## 2018-06-21 RX ADMIN — MORPHINE SULFATE 3 MG: 4 INJECTION INTRAVENOUS at 16:07

## 2018-06-21 RX ADMIN — PROCHLORPERAZINE EDISYLATE 5 MG: 5 INJECTION INTRAMUSCULAR; INTRAVENOUS at 06:26

## 2018-06-21 RX ADMIN — ONDANSETRON 4 MG: 2 INJECTION INTRAMUSCULAR; INTRAVENOUS at 16:10

## 2018-06-21 RX ADMIN — INSULIN HUMAN 3 UNITS: 100 INJECTION, SOLUTION PARENTERAL at 17:36

## 2018-06-21 RX ADMIN — MORPHINE SULFATE 3 MG: 4 INJECTION INTRAVENOUS at 01:43

## 2018-06-21 ASSESSMENT — ENCOUNTER SYMPTOMS
PND: 0
NEUROLOGICAL NEGATIVE: 1
HEMOPTYSIS: 0
EYE DISCHARGE: 0
BACK PAIN: 0
SHORTNESS OF BREATH: 0
NAUSEA: 1
WEAKNESS: 0
BLOOD IN STOOL: 0
BLURRED VISION: 0
WHEEZING: 0
CHILLS: 0
MEMORY LOSS: 0
FEVER: 0
COUGH: 0
HALLUCINATIONS: 0
NERVOUS/ANXIOUS: 0
EYE REDNESS: 0
VOMITING: 1
PALPITATIONS: 0

## 2018-06-21 ASSESSMENT — PAIN SCALES - GENERAL
PAINLEVEL_OUTOF10: 9
PAINLEVEL_OUTOF10: 9
PAINLEVEL_OUTOF10: 8
PAINLEVEL_OUTOF10: 9
PAINLEVEL_OUTOF10: 5

## 2018-06-21 ASSESSMENT — LIFESTYLE VARIABLES: SUBSTANCE_ABUSE: 0

## 2018-06-21 NOTE — CARE PLAN
Problem: Safety  Goal: Will remain free from injury  Outcome: PROGRESSING AS EXPECTED      Problem: Bowel/Gastric:  Goal: Normal bowel function is maintained or improved  Outcome: PROGRESSING SLOWER THAN EXPECTED  Pt has been having N/V throughout the morning, unable to keep down PO.

## 2018-06-21 NOTE — PROGRESS NOTES
"Pt is AOx4. She has been having N/V throughout the morning. Refused PO medications stating \" I cant keep them down\". MD Gavin was made aware. Pt has been given scheduled and PRN antiemetics and pain medications. VSS. Will continue to monitor.   "

## 2018-06-21 NOTE — PROGRESS NOTES
[facility] Hospitalist Progress Note    Date of Service: 6/21/2018    Chief Complaint  27 y.o. female admitted 6/13/2018 with recurrent DKA    Interval Problem Update    6/17:  Transferred out of ICU  Mother at bedside  Tolerated some clears and erythromycin reports that she is feeling better    6/18:  Vomited this a.m. Greenish liquid.  States unable to keep fluids down. Spoke with Dr. Gan, GI.  Ordered UGI with small bowel follow through. Continue IVFs until adequate po intake.    6/19:  Upper GI Series unable to complete fully due the patient not able to tolerate the full contrast dose and vomited up the contrast fluid.  The patient reports still feeling nauseous.  Advised nursing staff to advance diet to full diet because the patient has been on liquid diet in the last 3 days which is just mainly fruit juice with high acidity.    6/20:  The patient still had several nausea and vomiting episode through out last night and unable to tolerate much diet.   I have discussed with the patient that getting opioid medication will actually worsen her gastroparesis.   Will wean down on morphine and start given Toradol for pain control.    6/21:  Patient still has persistent nausea and vomiting despite med therapy.  Consult GI specialist, Dr. Child.  Discussed possible alternative med therapy with staff pharmacist, Susie.  Possible initiation of trial of Zyprexa.  The patient's mother is at bedside.  With the patient's permission, I update the patient's mother regarding current treatment plan and further med therapy.      Consultants/Specialty  Critical care  Gastroenterologist    Disposition  Home with mother once medically cleared.        Review of Systems   Constitutional: Negative for chills and fever.   HENT: Negative.    Eyes: Negative for blurred vision, discharge and redness.   Respiratory: Negative for cough, hemoptysis, shortness of breath and wheezing.    Cardiovascular: Negative for chest pain, palpitations, leg  swelling and PND.   Gastrointestinal: Positive for nausea and vomiting. Negative for blood in stool and melena.   Genitourinary: Negative.    Musculoskeletal: Negative for back pain and joint pain.   Skin: Negative.    Neurological: Negative.  Negative for weakness.   Psychiatric/Behavioral: Negative for hallucinations, memory loss and substance abuse. The patient is not nervous/anxious.    All other systems reviewed and are negative.     Physical Exam  Laboratory/Imaging   Hemodynamics  Temp (24hrs), Av.4 °C (97.6 °F), Min:36.2 °C (97.1 °F), Max:36.8 °C (98.2 °F)   Temperature: 36.6 °C (97.8 °F)  Pulse  Av  Min: 60  Max: 142    Blood Pressure: 130/92      Respiratory      Respiration: 18, Pulse Oximetry: 94 %        RLL Breath Sounds: Diminished, LLL Breath Sounds: Diminished    Fluids    Intake/Output Summary (Last 24 hours) at 18 1020  Last data filed at 18 0310   Gross per 24 hour   Intake                0 ml   Output              100 ml   Net             -100 ml       Nutrition  Orders Placed This Encounter   Procedures   • DIET ORDER     Standing Status:   Standing     Number of Occurrences:   1     Order Specific Question:   Diet:     Answer:   Full Liquid [11]     Comments:   aovid acidic food and drink     Physical Exam   Constitutional: She is oriented to person, place, and time. She appears well-developed and well-nourished.   HENT:   Head: Normocephalic and atraumatic.   Mouth/Throat: Oropharynx is clear and moist. No oropharyngeal exudate.   Eyes: Conjunctivae are normal. Pupils are equal, round, and reactive to light. Right eye exhibits no discharge. Left eye exhibits no discharge. No scleral icterus.   Neck: Neck supple. No JVD present. No tracheal deviation present.   Cardiovascular: Normal rate and regular rhythm.  Exam reveals no gallop and no friction rub.    No murmur heard.  Pulmonary/Chest: Effort normal and breath sounds normal. No stridor. No respiratory distress. She  exhibits no tenderness.   Abdominal: Soft. Bowel sounds are normal. She exhibits no distension. There is tenderness. There is no rebound.   Mild generalized tenderness   Musculoskeletal: She exhibits no edema or tenderness.   Neurological: She is alert and oriented to person, place, and time. No cranial nerve deficit. She exhibits normal muscle tone.   Skin: Skin is warm and dry. No rash noted. She is not diaphoretic. No cyanosis or erythema. Nails show no clubbing.   Psychiatric: She has a normal mood and affect. Her behavior is normal.   Nursing note and vitals reviewed.      Recent Labs      06/19/18   0231  06/20/18   0427  06/21/18   0406   WBC  6.1  5.3  4.8   RBC  4.49  4.42  4.23   HEMOGLOBIN  13.0  12.8  12.1   HEMATOCRIT  38.5  38.0  35.2*   MCV  85.7  86.0  83.2   MCH  29.0  29.0  28.6   MCHC  33.8  33.7  34.4   RDW  39.2  39.0  38.0   PLATELETCT  210  222  200   MPV  10.7  10.4  10.8     Recent Labs      06/19/18   0231  06/20/18   0227  06/21/18   0406   SODIUM  141  140  139   POTASSIUM  3.6  4.0  3.5*   CHLORIDE  106  107  104   CO2  26  24  22   GLUCOSE  72  122*  149*   BUN  3*  5*  6*   CREATININE  0.42*  0.46*  0.45*   CALCIUM  9.0  8.5  8.6                      Assessment/Plan     * Diabetic ketoacidosis (HCC)- (present on admission)   Assessment & Plan    Resolved    Latest Hgb A1C is 8.8.        Intractable nausea and vomiting- (present on admission)   Assessment & Plan      Likely secondary to gastritis/esophagitis and gastroparesis    Continue Reglan and Compazine  Continue IV Protonix  Continue Carafate  Continue hydration with IV fluids  6/18:  Reviewed records:  Recent EGD with dilation of pyloric stenosis by Dr. Black.  Pathology results negative.  Ordered UGI with small bowel follow through.  6/19:   Change reglan to be given before each meal and at bed time.  6/20:  Adding erythromycin oral solution three times daily.  No IV erythromycin available per pharmacist.  Give GI cocktail.   Modified diet to avoid acidic food or drink.  Reportedly, dietary has been giving the patient Apple juice and tomato soup.  6/21:  Discussed the patient's case with Dr. Child, who thinks the patient may have cyclic vomiting syndrome.   The patient to start on benadryl. Continue iv fluid to prevent dehydration.    Trial of low dose Zyprexa.           Gastroparesis due to DM (HCC)- (present on admission)   Assessment & Plan    Scheduled Reglan with each meal and Compazine  IV fluids for hydration  Discussed with the patient regarding the importance of diabetic control to prevent further exacerbation.  Advance diet as tolerated.  Start  Erythromycin.  Discussed with the patient that morphine can make her condition worse.  Start Toradol.        Esophagitis- (present on admission)   Assessment & Plan    And gastritis as well as pyloric stenosis seen on EGD done on 6/9/18.  Continue Carafate and IV Protonix        Type 1 diabetes mellitus (HCC)- (present on admission)   Assessment & Plan    Uncontrolled with hyperglycemia   Previously HbA1c 12.5, now 8.8.    -- Decrease the patient's Lantus from 23 to 10 units twice a day because the patient has poor oral intake.   -- sliding scale insulin and close monitoring        Hypomagnesemia   Assessment & Plan    Repeat mag level is 1.5.  Additional magnesium to be given.        Hypokalemia- (present on admission)   Assessment & Plan    -- change iv fluid to 0.45 saline with KCl 20 meq.    -- monitor level.        Pyloric stenosis   Assessment & Plan    Status post dilatation on 6/9.   Abdominal x-ray was negative  Discussed with Dr. Lopez from GI he recommended medical management and erythromycin to help with gastroparesis  6/18: Persistent emesis,  Reviewed with Dr. Gan, on call GI,  wants upper GI with small bowel follow through.  Depending on these results, please formally consult GI.   6/19:  No pyloric stenosis seen on the patient's limited upper gi series.         Anemia- (present on admission)   Assessment & Plan    No signs of bleeding hemoglobin overall stable          Quality-Core Measures   Reviewed items::  Labs reviewed, Medications reviewed and Radiology images reviewed  Pitt catheter::  No Pitt  DVT prophylaxis pharmacological::  Enoxaparin (Lovenox)  Ulcer Prophylaxis::  Yes

## 2018-06-21 NOTE — PROGRESS NOTES
Assumed care of pt, discussed plan of care. A&Ox4. RA, tolerates well. RLL, LLL diminished. Complains of 9/10 pain to abdomen, medicated per MAR. Last BM, PTA. Continent of bladder. Skin intact. Up-self, generalized weakness. On full liquid diet. IV, CDI, running LR at 83 mL/hr. Fall precautions in place; bed lowest position, treaded socks on, call light within reach. All needs met at this time.     Pt complains of nausea, has been vomiting on and off. States no relief with zofran, wants to wait for scheduled compazine. PO medications held due to N/V; unable to keep anything down.

## 2018-06-21 NOTE — CARE PLAN
Problem: Bowel/Gastric:  Goal: Normal bowel function is maintained or improved    Intervention: Educate patient and significant other/support system about diet, fluid intake, medications and activity to promote bowel function  Pt complains of nausea with episodes of vomiting throughout shift. Pt has PRN and scheduled medications to help with N/V. Pt reports she is unable to keep anything down. Unable to take PO medications at this time. Doesn't want to eat/drink anything at this time. Educated about decrease intake, use of narcotics for pain control both contributing to no bowel movement. Has LR running.       Problem: Pain Management  Goal: Pain level will decrease to patient's comfort goal    Intervention: Follow pain managment plan developed in collaboration with patient and Interdisciplinary Team  Pt complains of pain to abdomen, medicated per MAR. Pt has PRN pain meds available for pain control.

## 2018-06-21 NOTE — CONSULTS
Date of Consultation:  6/21/2018    Patient: : Alis Sparks  MRN: [id number]    Referring Physician: Dr. Arnulfo Gavin     GI:Lior Child M.D.     Reason for Consultation:Nausea/Vomiting, Abdominal pain    History of Present Illness:   Thank you for allowing us to consult on this patient.This is a 27-year-old female with history of type I diabetes for 11 years in duration, insulin-dependent, who GI has been consulted for intractable nausea and vomiting. Patient was admitted on June 13, 2018 for abdominal pain and DKA with serum blood glucose level greater than 500 with high anion gap and positive ketones. UA was negative. She was admitted to ICU for care with resolution of her ketosis and improvement of her glucose. Her she continues to have continuous nausea vomiting. Of note patient recently had an EGD June 9, 2018 showing erosive esophagitis and gastritis with possible pyloric stenosis status post dilation by Dr. Black to 20mm. the patient was also discharged per return 2 days after discharge unable to tolerate significant by mouth.She states that she will feel well for several months and then develop some epigastric pain that lasts for a day and then onset of intractable nausea and vomiting leading to DKA and admission to hospital. An episode of the longest for 1 week in duration      Most recently she was consulted on 6/6/2018 by Dr. Paredes. Note reviewed and extrapolated as follow: history of multiple admissions for recurrent nausea and vomiting and DKA dating back to 2014.  She was evaluated in 2014 with EGD and 90 minute gastric emptying study.  EGD was unremarkable except nonerosive gastritis and single duodenal diverticulum.  Gastric biopsies negative for H pylori.  She also had 90 minute gastric emptying study that was normal.  She was diagnosed with suspected gastroparesis and started on Reglan. Recent CT ab/pelvis showing some thickening of distal esophagus and abdominal US which shows  small GB sludge only. Previously she has been on IV Reglan, Zofran, Compazine and Benadryl without much improvement.      Past Medical History:   Diagnosis Date   • Backpain    • Bronchitis    • Diabetes type 1, controlled (Formerly [facility])     tests 4-5 times daily   • DKA (diabetic ketoacidoses) (Formerly [facility])    • Fall    • Gastroparesis    • Kidney infection    • Pneumonia    • UTI (urinary tract infection)          Past Surgical History:   Procedure Laterality Date   • GASTROSCOPY-ENDO N/A 6/9/2018    Procedure: GASTROSCOPY-ENDO WITH BIOPSIES AND DIALATION;  Surgeon: Marino Black M.D.;  Location: SURGERY [facility];  Service: Gastroenterology   • SUBMANDIBLE ABSCESS INCISION AND DRAINAGE Left 11/8/2017    Procedure: SUBMANDIBLE ABSCESS INCISION AND DRAINAGE;  Surgeon: Osman Young M.D.;  Location: SURGERY [facility];  Service: Dental   • DENTAL EXTRACTION(S)  11/8/2017    Procedure: DENTAL EXTRACTION(S);  Surgeon: Osman Young M.D.;  Location: SURGERY [facility];  Service: Dental   • GASTROSCOPY-ENDO  9/18/2014    Performed by Sylvain PERRY M.D. at ENDOSCOPY [facility]   • GASTROSCOPY WITH BIOPSY  9/18/2014    Performed by Sylvain PERRY M.D. at ENDOSCOPY [facility]   • OTHER      surgery to patch lung after pneumor from central line placement       Family History   Problem Relation Age of Onset   • Cancer       breasts, multiple family members   • Hypertension Maternal Grandfather        Social History     Social History   • Marital status: Single     Spouse name: N/A   • Number of children: N/A   • Years of education: N/A     Social History Main Topics   • Smoking status: Never Smoker   • Smokeless tobacco: Never Used   • Alcohol use No   • Drug use: No   • Sexual activity: No     Other Topics Concern   • Not on file     Social History Narrative   • No narrative on file       ROS      Physical Exam:  Vitals:    06/20/18 1535 06/20/18 2000 06/21/18 0310 06/21/18 0800   BP: 148/95 146/97 142/93  130/92   Pulse: 100 99 90 (!) 105   Resp: 16 18 18 18   Temp: 36.8 °C (98.2 °F) 36.3 °C (97.3 °F) 36.2 °C (97.1 °F) 36.6 °C (97.8 °F)   SpO2: 95% 94% 93% 94%   Weight:       Height:           Physical Exam      Labs:  Recent Labs      06/19/18   0231  06/20/18   0427  06/21/18   0406   WBC  6.1  5.3  4.8   RBC  4.49  4.42  4.23   HEMOGLOBIN  13.0  12.8  12.1   HEMATOCRIT  38.5  38.0  35.2*   MCV  85.7  86.0  83.2   MCH  29.0  29.0  28.6   MCHC  33.8  33.7  34.4   RDW  39.2  39.0  38.0   PLATELETCT  210  222  200   MPV  10.7  10.4  10.8     Recent Labs      06/19/18   0231  06/20/18   0227  06/21/18   0406   SODIUM  141  140  139   POTASSIUM  3.6  4.0  3.5*   CHLORIDE  106  107  104   CO2  26  24  22   GLUCOSE  72  122*  149*   BUN  3*  5*  6*         EGD 6/9/2018  IMPRESSIONS AND FINDINGS:  1.  [place] classification grade C ulcerative esophagitis -- likely the   result the patient's nausea and vomiting.  2.  Gastritis, suspected.  3.  Pyloric stenosis, possible, status post dilation.        Imaging:  UGI 6/19/2018  Examination is significantly limited as the patient vomited the majority of ingested contrast and refused to take more contrast by mouth.  No evidence of gastric outlet obstruction.  There may be mild gastric fold thickening which can be seen in the setting of gastritis.  No evidence of malrotation.    KUB 6/15/2018  No evidence for bowel obstruction.      HIDA 6/10/2018  1. Normal hepatobiliary scan. No evidence of acute cholecystitis.  2. Normal gallbladder ejection fraction.    US 6/9/2018  Heterogeneously echogenic lesion within the right lobe of the liver measuring 3 x 2.8 x 2.2 cm may represent a hemangioma and was seen on prior ultrasound. Confirmation can be obtained with hepatic protocol MRI.  Hepatomegaly.  No evidence of gallstones or biliary ductal dilatation.    CT 6/3/2018  1.  Thickening of the distal esophageal wall, new since prior study, consider component of esophagitis. Could be  followed up with endoscopy for further evaluation as clinically appropriate.  2.  Hepatomegaly    Impressions:  1. Intractable nausea/vomiting  2. Insulin dependent Type 1 Diabetes  3. Diabetic ketoacidosis, resolved  4. Abdominal pain  5. Possible gastroparesis    27-year-old female with history of type I diabetes, diabetic ketoacidosis who is being seen today for intractable nausea vomiting. Questionable history of gastroparesis. Symptom persists despite being on erythromycin 500mg, Neurontin 100 mg, ketorolac, Reglan, Zofran, Compazine. In regards to MAR, refusing some medication by oral intake due to nausea. While gastroparesis is possible, other consideration would be cyclic vomiting syndrome due to persistent nausea with intermittent resolution of symptoms.    Recommendations:  1. Start Benadryl IV load with 50mg IV then every 25mg IV every 6 hours  2. Continue Reglan, compazine. Continue ketorolac  3. Encourage patient not to refuse oral medications if possible.  4. No need to EGD currently.  5. Dark quiet room without frequent disturbance of patient.   6. Consideration to start amitriptyline 25mg QHS, Coezyme Q10 with 200mg BID, L-carnitine 1g BID once oral intake improves.  7. Continue IV hydration to prevent dehydration.    This note was generated using voice recognition software which has a small chance of producing errors of grammar and possibly content. I have made every reasonable attempt to find and correct any obvious errors, but expect that some may not be found prior to finalization of this note.

## 2018-06-21 NOTE — PROGRESS NOTES
Pt has been vomiting bile off and on all day-frequent small amounts. Reports no relief from zofran, morphine the only thing that helps pain. Noted she is able to rest and does not vomit for a longer interval after giving morphine. She has been unable to eat today, either nothing sounds appetizing or foods she does try do not stay down. She is lethargic and wishes to go home soon, verbalizes frustration with illness. Bed low with call light in reach. No bed alarm, pt ambulates steadily.

## 2018-06-22 LAB
ANION GAP SERPL CALC-SCNC: 13 MMOL/L (ref 0–11.9)
BASOPHILS # BLD AUTO: 0.4 % (ref 0–1.8)
BASOPHILS # BLD: 0.02 K/UL (ref 0–0.12)
BUN SERPL-MCNC: 5 MG/DL (ref 8–22)
CALCIUM SERPL-MCNC: 8.6 MG/DL (ref 8.5–10.5)
CHLORIDE SERPL-SCNC: 104 MMOL/L (ref 96–112)
CO2 SERPL-SCNC: 16 MMOL/L (ref 20–33)
CREAT SERPL-MCNC: 0.51 MG/DL (ref 0.5–1.4)
EOSINOPHIL # BLD AUTO: 0.06 K/UL (ref 0–0.51)
EOSINOPHIL NFR BLD: 1.2 % (ref 0–6.9)
ERYTHROCYTE [DISTWIDTH] IN BLOOD BY AUTOMATED COUNT: 37.6 FL (ref 35.9–50)
GLUCOSE BLD-MCNC: 181 MG/DL (ref 65–99)
GLUCOSE BLD-MCNC: 182 MG/DL (ref 65–99)
GLUCOSE BLD-MCNC: 198 MG/DL (ref 65–99)
GLUCOSE BLD-MCNC: 201 MG/DL (ref 65–99)
GLUCOSE BLD-MCNC: 216 MG/DL (ref 65–99)
GLUCOSE SERPL-MCNC: 199 MG/DL (ref 65–99)
HCT VFR BLD AUTO: 35.4 % (ref 37–47)
HGB BLD-MCNC: 12.2 G/DL (ref 12–16)
IMM GRANULOCYTES # BLD AUTO: 0.02 K/UL (ref 0–0.11)
IMM GRANULOCYTES NFR BLD AUTO: 0.4 % (ref 0–0.9)
LYMPHOCYTES # BLD AUTO: 1.4 K/UL (ref 1–4.8)
LYMPHOCYTES NFR BLD: 28.3 % (ref 22–41)
MAGNESIUM SERPL-MCNC: 1.6 MG/DL (ref 1.5–2.5)
MCH RBC QN AUTO: 29.1 PG (ref 27–33)
MCHC RBC AUTO-ENTMCNC: 34.5 G/DL (ref 33.6–35)
MCV RBC AUTO: 84.5 FL (ref 81.4–97.8)
MONOCYTES # BLD AUTO: 0.42 K/UL (ref 0–0.85)
MONOCYTES NFR BLD AUTO: 8.5 % (ref 0–13.4)
NEUTROPHILS # BLD AUTO: 3.03 K/UL (ref 2–7.15)
NEUTROPHILS NFR BLD: 61.2 % (ref 44–72)
NRBC # BLD AUTO: 0 K/UL
NRBC BLD-RTO: 0 /100 WBC
PLATELET # BLD AUTO: 204 K/UL (ref 164–446)
PMV BLD AUTO: 10.3 FL (ref 9–12.9)
POTASSIUM SERPL-SCNC: 4 MMOL/L (ref 3.6–5.5)
RBC # BLD AUTO: 4.19 M/UL (ref 4.2–5.4)
SODIUM SERPL-SCNC: 133 MMOL/L (ref 135–145)
WBC # BLD AUTO: 5 K/UL (ref 4.8–10.8)

## 2018-06-22 PROCEDURE — 700111 HCHG RX REV CODE 636 W/ 250 OVERRIDE (IP): Performed by: INTERNAL MEDICINE

## 2018-06-22 PROCEDURE — 99232 SBSQ HOSP IP/OBS MODERATE 35: CPT | Performed by: INTERNAL MEDICINE

## 2018-06-22 PROCEDURE — 700102 HCHG RX REV CODE 250 W/ 637 OVERRIDE(OP): Performed by: FAMILY MEDICINE

## 2018-06-22 PROCEDURE — 80048 BASIC METABOLIC PNL TOTAL CA: CPT

## 2018-06-22 PROCEDURE — A9270 NON-COVERED ITEM OR SERVICE: HCPCS | Performed by: FAMILY MEDICINE

## 2018-06-22 PROCEDURE — 83735 ASSAY OF MAGNESIUM: CPT

## 2018-06-22 PROCEDURE — A9270 NON-COVERED ITEM OR SERVICE: HCPCS | Performed by: INTERNAL MEDICINE

## 2018-06-22 PROCEDURE — C9113 INJ PANTOPRAZOLE SODIUM, VIA: HCPCS | Performed by: HOSPITALIST

## 2018-06-22 PROCEDURE — 700102 HCHG RX REV CODE 250 W/ 637 OVERRIDE(OP): Performed by: INTERNAL MEDICINE

## 2018-06-22 PROCEDURE — 82962 GLUCOSE BLOOD TEST: CPT | Mod: 91

## 2018-06-22 PROCEDURE — 700101 HCHG RX REV CODE 250: Performed by: INTERNAL MEDICINE

## 2018-06-22 PROCEDURE — 700111 HCHG RX REV CODE 636 W/ 250 OVERRIDE (IP): Performed by: HOSPITALIST

## 2018-06-22 PROCEDURE — 700111 HCHG RX REV CODE 636 W/ 250 OVERRIDE (IP): Performed by: FAMILY MEDICINE

## 2018-06-22 PROCEDURE — 85025 COMPLETE CBC W/AUTO DIFF WBC: CPT

## 2018-06-22 PROCEDURE — 770006 HCHG ROOM/CARE - MED/SURG/GYN SEMI*

## 2018-06-22 RX ORDER — DIPHENHYDRAMINE HYDROCHLORIDE 50 MG/ML
50 INJECTION INTRAMUSCULAR; INTRAVENOUS EVERY 6 HOURS
Status: DISCONTINUED | OUTPATIENT
Start: 2018-06-22 | End: 2018-06-26

## 2018-06-22 RX ORDER — SODIUM CHLORIDE AND POTASSIUM CHLORIDE 150; 900 MG/100ML; MG/100ML
INJECTION, SOLUTION INTRAVENOUS CONTINUOUS
Status: DISCONTINUED | OUTPATIENT
Start: 2018-06-22 | End: 2018-06-23

## 2018-06-22 RX ORDER — OLANZAPINE 5 MG/1
5 TABLET ORAL EVERY EVENING
Status: DISCONTINUED | OUTPATIENT
Start: 2018-06-22 | End: 2018-06-29 | Stop reason: HOSPADM

## 2018-06-22 RX ADMIN — PROCHLORPERAZINE EDISYLATE 5 MG: 5 INJECTION INTRAMUSCULAR; INTRAVENOUS at 22:01

## 2018-06-22 RX ADMIN — GABAPENTIN 100 MG: 100 CAPSULE ORAL at 08:52

## 2018-06-22 RX ADMIN — INSULIN GLARGINE 10 UNITS: 100 INJECTION, SOLUTION SUBCUTANEOUS at 08:50

## 2018-06-22 RX ADMIN — PANTOPRAZOLE SODIUM 40 MG: 40 INJECTION, POWDER, LYOPHILIZED, FOR SOLUTION INTRAVENOUS at 08:52

## 2018-06-22 RX ADMIN — POTASSIUM CHLORIDE AND SODIUM CHLORIDE: 900; 150 INJECTION, SOLUTION INTRAVENOUS at 22:22

## 2018-06-22 RX ADMIN — MORPHINE SULFATE 3 MG: 4 INJECTION INTRAVENOUS at 13:10

## 2018-06-22 RX ADMIN — METOCLOPRAMIDE 10 MG: 5 INJECTION, SOLUTION INTRAMUSCULAR; INTRAVENOUS at 17:54

## 2018-06-22 RX ADMIN — ONDANSETRON 4 MG: 2 INJECTION INTRAMUSCULAR; INTRAVENOUS at 04:59

## 2018-06-22 RX ADMIN — MORPHINE SULFATE 3 MG: 4 INJECTION INTRAVENOUS at 09:05

## 2018-06-22 RX ADMIN — PROCHLORPERAZINE EDISYLATE 5 MG: 5 INJECTION INTRAMUSCULAR; INTRAVENOUS at 14:56

## 2018-06-22 RX ADMIN — MORPHINE SULFATE 3 MG: 4 INJECTION INTRAVENOUS at 01:00

## 2018-06-22 RX ADMIN — PROCHLORPERAZINE EDISYLATE 10 MG: 5 INJECTION INTRAMUSCULAR; INTRAVENOUS at 01:01

## 2018-06-22 RX ADMIN — PANTOPRAZOLE SODIUM 40 MG: 40 INJECTION, POWDER, LYOPHILIZED, FOR SOLUTION INTRAVENOUS at 22:30

## 2018-06-22 RX ADMIN — INSULIN HUMAN 3 UNITS: 100 INJECTION, SOLUTION PARENTERAL at 06:34

## 2018-06-22 RX ADMIN — ENOXAPARIN SODIUM 40 MG: 100 INJECTION SUBCUTANEOUS at 21:57

## 2018-06-22 RX ADMIN — METOCLOPRAMIDE 10 MG: 5 INJECTION, SOLUTION INTRAMUSCULAR; INTRAVENOUS at 22:01

## 2018-06-22 RX ADMIN — POTASSIUM CHLORIDE AND SODIUM CHLORIDE: 450; 150 INJECTION, SOLUTION INTRAVENOUS at 14:57

## 2018-06-22 RX ADMIN — CHOLECALCIFEROL TAB 10 MCG (400 UNIT) 400 UNITS: 10 TAB at 08:52

## 2018-06-22 RX ADMIN — KETOROLAC TROMETHAMINE 30 MG: 30 INJECTION, SOLUTION INTRAMUSCULAR at 04:59

## 2018-06-22 RX ADMIN — ERYTHROMYCIN ETHYLSUCCINATE 500 MG: 200 SUSPENSION ORAL at 08:52

## 2018-06-22 RX ADMIN — MORPHINE SULFATE 3 MG: 4 INJECTION INTRAVENOUS at 04:59

## 2018-06-22 RX ADMIN — INSULIN HUMAN 2 UNITS: 100 INJECTION, SOLUTION PARENTERAL at 17:54

## 2018-06-22 RX ADMIN — PROCHLORPERAZINE EDISYLATE 5 MG: 5 INJECTION INTRAMUSCULAR; INTRAVENOUS at 06:24

## 2018-06-22 RX ADMIN — DIPHENHYDRAMINE HYDROCHLORIDE 25 MG: 50 INJECTION, SOLUTION INTRAMUSCULAR; INTRAVENOUS at 12:41

## 2018-06-22 RX ADMIN — INSULIN HUMAN 2 UNITS: 100 INJECTION, SOLUTION PARENTERAL at 12:46

## 2018-06-22 RX ADMIN — METOCLOPRAMIDE 10 MG: 5 INJECTION, SOLUTION INTRAMUSCULAR; INTRAVENOUS at 12:41

## 2018-06-22 RX ADMIN — DIPHENHYDRAMINE HYDROCHLORIDE 50 MG: 50 INJECTION, SOLUTION INTRAMUSCULAR; INTRAVENOUS at 17:53

## 2018-06-22 RX ADMIN — DIPHENHYDRAMINE HYDROCHLORIDE 25 MG: 50 INJECTION, SOLUTION INTRAMUSCULAR; INTRAVENOUS at 06:24

## 2018-06-22 RX ADMIN — POTASSIUM CHLORIDE AND SODIUM CHLORIDE: 450; 150 INJECTION, SOLUTION INTRAVENOUS at 06:36

## 2018-06-22 RX ADMIN — ATORVASTATIN CALCIUM 20 MG: 20 TABLET, FILM COATED ORAL at 08:52

## 2018-06-22 RX ADMIN — INSULIN HUMAN 3 UNITS: 100 INJECTION, SOLUTION PARENTERAL at 22:25

## 2018-06-22 RX ADMIN — INSULIN GLARGINE 10 UNITS: 100 INJECTION, SOLUTION SUBCUTANEOUS at 22:26

## 2018-06-22 RX ADMIN — METOCLOPRAMIDE 10 MG: 5 INJECTION, SOLUTION INTRAMUSCULAR; INTRAVENOUS at 06:24

## 2018-06-22 RX ADMIN — DIPHENHYDRAMINE HYDROCHLORIDE 50 MG: 50 INJECTION, SOLUTION INTRAMUSCULAR; INTRAVENOUS at 22:01

## 2018-06-22 RX ADMIN — MORPHINE SULFATE 3 MG: 4 INJECTION INTRAVENOUS at 22:41

## 2018-06-22 RX ADMIN — MORPHINE SULFATE 3 MG: 4 INJECTION INTRAVENOUS at 17:54

## 2018-06-22 ASSESSMENT — ENCOUNTER SYMPTOMS
CONSTIPATION: 0
HEMOPTYSIS: 0
WHEEZING: 0
BACK PAIN: 0
HEARTBURN: 0
EYES NEGATIVE: 1
VOMITING: 1
COUGH: 0
SHORTNESS OF BREATH: 0
EYE DISCHARGE: 0
MEMORY LOSS: 0
NEUROLOGICAL NEGATIVE: 1
CARDIOVASCULAR NEGATIVE: 1
PSYCHIATRIC NEGATIVE: 1
WEAKNESS: 0
CHILLS: 0
BLOOD IN STOOL: 0
PALPITATIONS: 0
ABDOMINAL PAIN: 1
NAUSEA: 1
FEVER: 0
DIARRHEA: 0
PND: 0
HALLUCINATIONS: 0
EYE REDNESS: 0
MUSCULOSKELETAL NEGATIVE: 1
RESPIRATORY NEGATIVE: 1
BLURRED VISION: 0
NERVOUS/ANXIOUS: 0
CONSTITUTIONAL NEGATIVE: 1

## 2018-06-22 ASSESSMENT — PAIN SCALES - GENERAL
PAINLEVEL_OUTOF10: 9
PAINLEVEL_OUTOF10: 0
PAINLEVEL_OUTOF10: 9
PAINLEVEL_OUTOF10: 6

## 2018-06-22 ASSESSMENT — LIFESTYLE VARIABLES: SUBSTANCE_ABUSE: 0

## 2018-06-22 NOTE — CARE PLAN
Problem: Safety  Goal: Will remain free from injury  Outcome: PROGRESSING AS EXPECTED      Problem: Pain Management  Goal: Pain level will decrease to patient's comfort goal  Outcome: PROGRESSING AS EXPECTED  PRN morphine being used with good effect

## 2018-06-22 NOTE — CARE PLAN
Problem: Safety  Goal: Will remain free from falls  Outcome: PROGRESSING AS EXPECTED  Bed locked and in lowest position. Non-skid slipper socks on when ambulating or out of bed. Patient verbalizes understanding and demonstrates proper use of call light. Patient educated to change positions slowly and sit at side of the bed before ambulating.     Problem: Bowel/Gastric:  Goal: Normal bowel function is maintained or improved  Outcome: PROGRESSING SLOWER THAN EXPECTED  Continuing IV fluids. Encouraging PO intake with full liquid diet and giving scheduled and prn nausea medications when due. Patient states she was unable to keep tomato soup she had for dinner down longer than 15 minutes.

## 2018-06-22 NOTE — CARE PLAN
Problem: Nutritional:  Goal: Achieve adequate nutritional intake  Patient will advance diet past clears and consume >50% of meals   Outcome: PROGRESSING SLOWER THAN EXPECTED  Diet advanced to full liquid.  PO 0% for the last three meals.  Spoke with pt at bedside and added high protein snack at dinner.  Reglan and Compazine on the MAR.

## 2018-06-22 NOTE — PROGRESS NOTES
Gastroenterology Progress Note     Author: Lior ORTIZ Danyell   Date & Time Created: 6/22/2018 3:56 PM    Chief Complaint:  Nausea/vomiting/abdominal pain    Interval History:  6/22/2018: Only 1 episode of vomiting in the last 24 hours.  Still have ongoing epigastric pain. Epigastric without trigger, recurrent not associated with food. No BM since admission. Passing flatus.    Review of Systems:  Review of Systems   Constitutional: Negative.    HENT: Negative.    Eyes: Negative.    Respiratory: Negative.    Cardiovascular: Negative.    Gastrointestinal: Positive for abdominal pain, nausea and vomiting. Negative for blood in stool, constipation, diarrhea, heartburn and melena.   Genitourinary: Negative.    Musculoskeletal: Negative.    Skin: Negative.    Neurological: Negative.    Endo/Heme/Allergies: Negative.    Psychiatric/Behavioral: Negative.        Physical Exam:  Physical Exam   Constitutional: She is oriented to person, place, and time. She appears well-developed and well-nourished. She appears distressed.   HENT:   Head: Normocephalic and atraumatic.   Eyes: Conjunctivae are normal. Pupils are equal, round, and reactive to light.   Neck: Normal range of motion. Neck supple. No JVD present. No tracheal deviation present. No thyromegaly present.   Cardiovascular: Normal rate and regular rhythm.    Pulmonary/Chest: Effort normal and breath sounds normal. No stridor.   Abdominal: Soft. Bowel sounds are normal. She exhibits no distension and no mass. There is tenderness. There is no rebound and no guarding.   Musculoskeletal: Normal range of motion.   Lymphadenopathy:     She has no cervical adenopathy.   Neurological: She is alert and oriented to person, place, and time.   Skin: Skin is warm and dry.       Labs:        Invalid input(s): [id number]      Recent Labs      06/20/18   0227  06/21/18   0406  06/21/18   1138  06/22/18   0324   SODIUM  140  139   --   133*   POTASSIUM  4.0  3.5*   --   4.0   CHLORIDE  107   104   --   104   CO2  24  22   --   16*   BUN  5*  6*   --   5*   CREATININE  0.46*  0.45*   --   0.51   MAGNESIUM   --    --   1.5  1.6   CALCIUM  8.5  8.6   --   8.6     Recent Labs      18   0227  18   0406  18   1138  18   0324   LIPASE   --    --   42   --    GLUCOSE  122*  149*   --   199*     Recent Labs      18   0427  18   0406  18   0324   RBC  4.42  4.23  4.19*   HEMOGLOBIN  12.8  12.1  12.2   HEMATOCRIT  38.0  35.2*  35.4*   PLATELETCT  222  200  204     Recent Labs      18   0406  18   0324   WBC  5.3  4.8  5.0   NEUTSPOLYS  51.40  55.20  61.20   LYMPHOCYTES  36.10  33.00  28.30   MONOCYTES  8.70  7.70  8.50   EOSINOPHILS  2.60  2.50  1.20   BASOPHILS  0.80  1.20  0.40     Hemodynamics:  Temp (24hrs), Av.3 °C (97.4 °F), Min:36.2 °C (97.2 °F), Max:36.5 °C (97.7 °F)  Temperature: 36.3 °C (97.4 °F)  Pulse  Av.1  Min: 60  Max: 142   Blood Pressure: 137/82 (nurse aware)     Respiratory:    Respiration: 16, Pulse Oximetry: 98 %        RUL Breath Sounds: Clear, RML Breath Sounds: Clear, RLL Breath Sounds: Diminished, NANCY Breath Sounds: Clear, LLL Breath Sounds: Diminished  Fluids:    Intake/Output Summary (Last 24 hours) at 18 1556  Last data filed at 18 1400   Gross per 24 hour   Intake                0 ml   Output              750 ml   Net             -750 ml        GI/Nutrition:  Orders Placed This Encounter   Procedures   • DIET ORDER     Standing Status:   Standing     Number of Occurrences:   1     Order Specific Question:   Diet:     Answer:   Full Liquid [11]     Comments:   aovid acidic food and drink     Medical Decision Making, by Problem:  Active Hospital Problems    Diagnosis   • *Diabetic ketoacidosis (HCC) [E13.10]   • Intractable nausea and vomiting [R11.2]   • Gastroparesis due to DM (HCC) [E11.43, K31.84]   • Esophagitis [K20.9]   • Type 1 diabetes mellitus (HCC) [E10.9]   • Pyloric stenosis [K31.1]    • Anemia [D64.9]   • Hypomagnesemia [E83.42]   • Hypokalemia [E87.6]     EGD 6/9/2018  IMPRESSIONS AND FINDINGS:  1.  [place] classification grade C ulcerative esophagitis -- likely the   result the patient's nausea and vomiting.  2.  Gastritis, suspected.  3.  Pyloric stenosis, possible, status post dilation.           Imaging:  UGI 6/19/2018  Examination is significantly limited as the patient vomited the majority of ingested contrast and refused to take more contrast by mouth.  No evidence of gastric outlet obstruction.  There may be mild gastric fold thickening which can be seen in the setting of gastritis.  No evidence of malrotation.     KUB 6/15/2018  No evidence for bowel obstruction.        HIDA 6/10/2018  1. Normal hepatobiliary scan. No evidence of acute cholecystitis.  2. Normal gallbladder ejection fraction.     US 6/9/2018  Heterogeneously echogenic lesion within the right lobe of the liver measuring 3 x 2.8 x 2.2 cm may represent a hemangioma and was seen on prior ultrasound. Confirmation can be obtained with hepatic protocol MRI.  Hepatomegaly.  No evidence of gallstones or biliary ductal dilatation.     CT 6/3/2018  1.  Thickening of the distal esophageal wall, new since prior study, consider component of esophagitis. Could be followed up with endoscopy for further evaluation as clinically appropriate.  2.  Hepatomegaly  Impressions:  1. Intractable nausea/vomiting  2. Insulin dependent Type 1 Diabetes  3. Diabetic ketoacidosis, resolved  4. Abdominal pain  5. Possible gastroparesis     27-year-old female with history of type I diabetes, diabetic ketoacidosis who is being seen today for intractable nausea vomiting. Questionable history of gastroparesis. Symptom persists despite being on erythromycin 500mg, Neurontin 100 mg, ketorolac, Reglan, Zofran, Compazine. In regards to MAR, refusing some medication by oral intake due to nausea. While gastroparesis is possible, other consideration would  be cyclic vomiting syndrome due to persistent nausea with intermittent resolution of symptoms.     Recommendations:  1. Increase benadryl to 50mg Q6 hours   2. Continue Reglan, compazine. Continue ketorolac  3. Dark quiet room without frequent disturbance of patient.   4. Consideration to start amitriptyline 25mg QHS, Coezyme Q10 with 200mg BID, L-carnitine 1g BID once oral intake improves.  5. Continue IV hydration to prevent dehydration.   6. If still no improvement start Ativan 1mg IV Q6 hours. May need to consider droperidol. Pain medication with dilaudid if persistent pain.        Quality-Core Measures

## 2018-06-22 NOTE — PROGRESS NOTES
[facility] Hospitalist Progress Note    Date of Service: 6/22/2018    Chief Complaint  27 y.o. female admitted 6/13/2018 with recurrent DKA    Interval Problem Update    6/17:  Transferred out of ICU  Mother at bedside  Tolerated some clears and erythromycin reports that she is feeling better    6/18:  Vomited this a.m. Greenish liquid.  States unable to keep fluids down. Spoke with Dr. Gan, GI.  Ordered UGI with small bowel follow through. Continue IVFs until adequate po intake.    6/19:  Upper GI Series unable to complete fully due the patient not able to tolerate the full contrast dose and vomited up the contrast fluid.  The patient reports still feeling nauseous.  Advised nursing staff to advance diet to full diet because the patient has been on liquid diet in the last 3 days which is just mainly fruit juice with high acidity.    6/20:  The patient still had several nausea and vomiting episode through out last night and unable to tolerate much diet.   I have discussed with the patient that getting opioid medication will actually worsen her gastroparesis.   Will wean down on morphine and start given Toradol for pain control.    6/21:  Patient still has persistent nausea and vomiting despite med therapy.  Consult GI specialist, Dr. Child.  Discussed possible alternative med therapy with staff pharmacist, Susie.  Possible initiation of trial of Zyprexa.  The patient's mother is at bedside.  With the patient's permission, I update the patient's mother regarding current treatment plan and further med therapy.    6/22:  Patient states that she has less nausea this morning but last night she was still vomiting   Continue Zyprexa.   Discussed with RN not to give this patient acidic food.    Consultants/Specialty  Critical care  Gastroenterologist    Disposition  Home with mother once medically cleared.        Review of Systems   Constitutional: Negative for chills and fever.   HENT: Negative.    Eyes: Negative for  blurred vision, discharge and redness.   Respiratory: Negative for cough, hemoptysis, shortness of breath and wheezing.    Cardiovascular: Negative for chest pain, palpitations, leg swelling and PND.   Gastrointestinal: Positive for nausea and vomiting. Negative for blood in stool and melena.   Genitourinary: Negative.    Musculoskeletal: Negative for back pain and joint pain.   Skin: Negative.    Neurological: Negative.  Negative for weakness.   Psychiatric/Behavioral: Negative for hallucinations, memory loss and substance abuse. The patient is not nervous/anxious.    All other systems reviewed and are negative.     Physical Exam  Laboratory/Imaging   Hemodynamics  Temp (24hrs), Av.3 °C (97.4 °F), Min:36.2 °C (97.2 °F), Max:36.5 °C (97.7 °F)   Temperature: 36.2 °C (97.2 °F)  Pulse  Av.1  Min: 60  Max: 142    Blood Pressure: 115/71      Respiratory      Respiration: 18, Pulse Oximetry: 97 %        RUL Breath Sounds: Clear, RML Breath Sounds: Clear, RLL Breath Sounds: Diminished, NANCY Breath Sounds: Clear, LLL Breath Sounds: Diminished    Fluids    Intake/Output Summary (Last 24 hours) at 18 0812  Last data filed at 18 0514   Gross per 24 hour   Intake                0 ml   Output              550 ml   Net             -550 ml       Nutrition  Orders Placed This Encounter   Procedures   • DIET ORDER     Standing Status:   Standing     Number of Occurrences:   1     Order Specific Question:   Diet:     Answer:   Full Liquid [11]     Comments:   aovid acidic food and drink     Physical Exam   Constitutional: She is oriented to person, place, and time. She appears well-developed and well-nourished.   HENT:   Head: Normocephalic and atraumatic.   Mouth/Throat: Oropharynx is clear and moist. No oropharyngeal exudate.   Eyes: Conjunctivae are normal. Pupils are equal, round, and reactive to light. Right eye exhibits no discharge. Left eye exhibits no discharge. No scleral icterus.   Neck: Neck supple.  No JVD present. No tracheal deviation present.   Cardiovascular: Normal rate and regular rhythm.  Exam reveals no gallop and no friction rub.    No murmur heard.  Pulmonary/Chest: Effort normal and breath sounds normal. No stridor. No respiratory distress. She exhibits no tenderness.   Abdominal: Soft. Bowel sounds are normal. She exhibits no distension. There is tenderness. There is no rebound.   Epigastric tenderness to palpation.   Musculoskeletal: She exhibits no edema or tenderness.   Neurological: She is alert and oriented to person, place, and time. No cranial nerve deficit. She exhibits normal muscle tone.   Skin: Skin is warm and dry. No rash noted. She is not diaphoretic. No cyanosis or erythema. Nails show no clubbing.   Psychiatric: She has a normal mood and affect. Her behavior is normal.   Nursing note and vitals reviewed.      Recent Labs      06/20/18   0427  06/21/18   0406  06/22/18   0324   WBC  5.3  4.8  5.0   RBC  4.42  4.23  4.19*   HEMOGLOBIN  12.8  12.1  12.2   HEMATOCRIT  38.0  35.2*  35.4*   MCV  86.0  83.2  84.5   MCH  29.0  28.6  29.1   MCHC  33.7  34.4  34.5   RDW  39.0  38.0  37.6   PLATELETCT  222  200  204   MPV  10.4  10.8  10.3     Recent Labs      06/20/18   0227  06/21/18   0406  06/22/18   0324   SODIUM  140  139  133*   POTASSIUM  4.0  3.5*  4.0   CHLORIDE  107  104  104   CO2  24  22  16*   GLUCOSE  122*  149*  199*   BUN  5*  6*  5*   CREATININE  0.46*  0.45*  0.51   CALCIUM  8.5  8.6  8.6                      Assessment/Plan     * Diabetic ketoacidosis (HCC)- (present on admission)   Assessment & Plan    Resolved    Latest Hgb A1C is 8.8.        Intractable nausea and vomiting- (present on admission)   Assessment & Plan      Likely secondary to gastritis/esophagitis and gastroparesis    Continue Reglan and Compazine  Continue IV Protonix  Continue Carafate  Continue hydration with IV fluids  6/18:  Reviewed records:  Recent EGD with dilation of pyloric stenosis by   [facility].  Pathology results negative.  Ordered UGI with small bowel follow through.  6/19:   Change reglan to be given before each meal and at bed time.  6/20:  Adding erythromycin oral solution three times daily.  No IV erythromycin available per pharmacist.  Give GI cocktail.  Modified diet to avoid acidic food or drink.  Reportedly, dietary has been giving the patient Apple juice and tomato soup.  6/21:  Discussed the patient's case with Dr. Child, who thinks the patient may have cyclic vomiting syndrome.   The patient to start on benadryl. Continue iv fluid to prevent dehydration.    Trial of low dose Zyprexa.     6/22:  Increase zyprexa dosage.   The patient is improving.   Advance diet as tolerated.        Gastroparesis due to DM (HCC)- (present on admission)   Assessment & Plan    Scheduled Reglan with each meal and Compazine  IV fluids for hydration  Discussed with the patient regarding the importance of diabetic control to prevent further exacerbation.  Advance diet as tolerated.  Start  Erythromycin.  Discussed with the patient that morphine can make her condition worse.  Start Toradol.        Esophagitis- (present on admission)   Assessment & Plan    And gastritis as well as pyloric stenosis seen on EGD done on 6/9/18.  Continue Carafate and IV Protonix        Type 1 diabetes mellitus (HCC)- (present on admission)   Assessment & Plan    Uncontrolled with hyperglycemia   Previously HbA1c 12.5, now 8.8.    -- Decrease the patient's Lantus to 10 units twice a day because the patient has poor oral intake.   -- sliding scale insulin and close monitoring        Hypomagnesemia   Assessment & Plan    Monitor magnesium level.  Magnesium supplement as needed.        Hypokalemia- (present on admission)   Assessment & Plan    -- change iv fluid to normal saline with KCl 20 meq as the patient has hyponatremia now.  -- monitor level.        Pyloric stenosis   Assessment & Plan    Status post dilatation on 6/9.    Abdominal x-ray was negative  Discussed with Dr. Lopez from GI he recommended medical management and erythromycin to help with gastroparesis  6/18: Persistent emesis,  Reviewed with Dr. Gan, on call GI,  wants upper GI with small bowel follow through.  Depending on these results, please formally consult GI.   6/19:  No pyloric stenosis seen on the patient's limited upper gi series.        Anemia- (present on admission)   Assessment & Plan    No signs of bleeding hemoglobin overall stable          Quality-Core Measures   Reviewed items::  Labs reviewed, Medications reviewed and Radiology images reviewed  Pitt catheter::  No Pitt  DVT prophylaxis pharmacological::  Enoxaparin (Lovenox)  Ulcer Prophylaxis::  Yes

## 2018-06-22 NOTE — PROGRESS NOTES
Alis is a patient well known to the diabetes educators, with existing Type 1 diabetes and DKA.  She has been hospitalized at this facility 8 times in the past year for DKA.  Her HbA1c= 8.8%, the best value she has had in years.  She is currently sleeping and difficult to arouse after having vomited.

## 2018-06-22 NOTE — CONSULTS
"Diabetes education: Pt known from previous admits. Pt has not yet been seen as pt continues to have nausea and GI recommendation is to have \"dark quiet room without frequent disturbance of patient\".  Plan: Alana CHAVIRA CDE to meet with patient tomorrow if appropriate. Please call [phone] if needs change.        Consults  Date of Service: 11/3/2015 1:37 PM  Marline Cameron R.N.      Diabetes education: Alis has hx of type one diabetes, and is known from previous admits:                  Consults by Marline Cameron R.N. at 9/8/2015 3:59 PM       Author: Marline Cameron R.N. Service: (none) Author Type: Diabetes Health Educator      Filed: 9/8/2015 4:03 PM Note Time: 9/8/2015 3:59 PM Status: Signed      : Marline Cameron R.N. (Diabetes Health Educator)          Expand All Collapse All       Diabetes education: Pt is well known from previous admits:              Consults by Mariah Fernández R.N. at 11/25/2014 11:22 AM      Author: Mariah Fernández R.N.  Service: (none)  Author Type: Registered Nurse      Filed: 11/25/2014 11:28 AM  Note Time: 11/25/2014 11:22 AM  Status: Signed      : Mariah Fernández R.N. (Registered Nurse)          Expand All Collapse All   Diabetes Education: Reviewed sick day management with Alis. She has a new meter at home and has seen the urine ketone strips before. We reviewed checking her urine ketones when she is sick or if her blood sugar is over 300. She has an insulin correction of 1:50>150 and verbalized understanding of correcting for ketones. She was encouraged to make up a sick day box and have every thing she needs in it including her sick day instructions. She states she has a prescription for Zofran if she gets nauseated. She was provided written instructions to refer to. She states she gets insurance January 1, 2015 and will get a doctor. She states she understands how hard it is on her to keep going into DKA and wants to start taking better care of herself.                     " "      Consults by Marline Cameron R.N. at 11/24/2014 5:18 PM      Author: Marline Cameron R.N.  Service: (none)  Author Type: Diabetes Health Educator      Filed: 11/24/2014 5:23 PM  Note Time: 11/24/2014 5:18 PM  Status: Signed      : Marline Cameron R.N. (Diabetes Health Educator)          Expand All Collapse All   Diabetes education: Alis well known to this CDE from previous admits. Issues were supplies but with last admit, she reports she has insulin and strips. Handouts were given to her regarding [employer], and [place] [employer]. Please refer to consult and progress notes for 11/9/14 admission (specifically 11/13/14).  She states this time her blood sugars were 293 prior to admit but increased to over 700 when she \"got here\".  She states they normally are not that high. Asked about testing for ketones and adding more insulin and she states she was never taught that.  Pt was just receiving medication for nausea. Will defer education until tomorrow or Wednesday.      Plan: discuss ketones, insulin and DKA prior to discharge when patient more appropriate. Please call [phone] if needs change.             Plan: Pt has just been moved to  and is currently sleeping. Questions regarding insulin coverage from  note. Mom not available. Pt was on Novolog and Lantus, but her insurance covers Apidra. There was some question about prior auth. Not sure if she cannot tolerate Apidra or did not know to have it changed. CDE will follow up with her tomorrow. Please call [phone] if needs change.                                     Author: Marline Cameron R.N. Service: (none) Author Type: Diabetes Health Educator       Filed: 9/9/2015 1:33 PM Note Time: 9/9/2015 1:17 PM Status: Signed     : Marline Cameron R.N. (Diabetes Health Educator)         Expand All Collapse All   Diabetes education: Met with Alis regarding insulin needs. Pt currently has [employer] which only covers Lantus and Apidra " "insulin. Pt states she was told by the [facility] pharmacy, they would cover Levemir and Novolog only for a short period of time and then would need a prior auth. Pt has used Lantus in the hospital without problems (currently on 15 units every 12 hours). She has never used Apidra (which has more of a \"tale\", and may work better for her gastroparesis).  Pt is struggling with eating. She took her insulin, ate breakfast well, and then threw up. Pt has not received any Humalog coverage since yesterday at 1658. She normally follows a 1:6 carbohydrate ratio with a correction of 1 unit for every 50 mg/dl above 150 (scale starting at 200) for meals only. She states she was taking 25 units of Levermir in am and 22 units in pm. Taking much less here and still with lower blood sugars : : 64 ( 76,and 85 after treating the low) and 91. 9/8: 110, 184, 221 and 169. Pt may benefit with decreasing pm Lantus and when able to tolerate food changing sliding scale coverage to meals only, starting at 200 and adding carbohydrate ratio of 1:6 again meals only.   Plan: As above as well as at discharge patient will need a prescription for Apidra and Lantus insulins to get coverage from her current insurance (pt will be changing medicaid in October). Please call [phone] if needs change.                 [facility] Not sure what are her concerns this admit, as she is sleeping. Pt is getting 25 units of Lantus HS and Humalog sliding scale every six hours. Pt would benefit from having coverage ac and hs when she begins to eat . At this point she has not required any Humalo, 88 and 131.   Plan: CDE will follow up tomorrow. Please call [phone] if needs change.                Progress Notes  Date of Service: 2015 3:12 PM  Marline Cameron R.N.      Diabetes education: Met with Alis, who states she is feeling much better. Asked what happened, she states she was fine at work, came home, began to eat, and after " first few bites became sick. States now with insurance she has her insulin (pens) and supplies and takes her insulin, checks her blood sugar, checks for ketones when sick, rotates sites, primes pen, holds at sight for 10 seconds, and does not use pen longer than 28 days.  Reviewed sick day, she knows to come in if not able to keep fluid down ( was able at first than later not ).  Pt will be setting up an appointment with an endocrinologist (had to clear up coverage with insurance).  Questions answered .  Please call [phone] if needs change.

## 2018-06-23 ENCOUNTER — APPOINTMENT (OUTPATIENT)
Dept: RADIOLOGY | Facility: MEDICAL CENTER | Age: 28
DRG: 637 | End: 2018-06-23
Attending: INTERNAL MEDICINE
Payer: MEDICAID

## 2018-06-23 ENCOUNTER — APPOINTMENT (OUTPATIENT)
Dept: RADIOLOGY | Facility: MEDICAL CENTER | Age: 28
DRG: 637 | End: 2018-06-23
Attending: HOSPITALIST
Payer: MEDICAID

## 2018-06-23 LAB
ALBUMIN SERPL BCP-MCNC: 3.6 G/DL (ref 3.2–4.9)
ALBUMIN/GLOB SERPL: 1.4 G/DL
ALP SERPL-CCNC: 97 U/L (ref 30–99)
ALT SERPL-CCNC: 13 U/L (ref 2–50)
ANION GAP SERPL CALC-SCNC: 10 MMOL/L (ref 0–11.9)
ANION GAP SERPL CALC-SCNC: 10 MMOL/L (ref 0–11.9)
ANION GAP SERPL CALC-SCNC: 17 MMOL/L (ref 0–11.9)
ANION GAP SERPL CALC-SCNC: 18 MMOL/L (ref 0–11.9)
ANION GAP SERPL CALC-SCNC: 18 MMOL/L (ref 0–11.9)
AST SERPL-CCNC: 11 U/L (ref 12–45)
B-OH-BUTYR SERPL-MCNC: 7.05 MMOL/L (ref 0.02–0.27)
BASOPHILS # BLD AUTO: 0.6 % (ref 0–1.8)
BASOPHILS # BLD: 0.05 K/UL (ref 0–0.12)
BILIRUB SERPL-MCNC: 0.5 MG/DL (ref 0.1–1.5)
BUN SERPL-MCNC: 3 MG/DL (ref 8–22)
BUN SERPL-MCNC: 4 MG/DL (ref 8–22)
BUN SERPL-MCNC: 5 MG/DL (ref 8–22)
CALCIUM SERPL-MCNC: 8.4 MG/DL (ref 8.5–10.5)
CALCIUM SERPL-MCNC: 8.6 MG/DL (ref 8.5–10.5)
CALCIUM SERPL-MCNC: 8.7 MG/DL (ref 8.5–10.5)
CALCIUM SERPL-MCNC: 8.7 MG/DL (ref 8.5–10.5)
CALCIUM SERPL-MCNC: 8.8 MG/DL (ref 8.5–10.5)
CHLORIDE SERPL-SCNC: 106 MMOL/L (ref 96–112)
CHLORIDE SERPL-SCNC: 108 MMOL/L (ref 96–112)
CHLORIDE SERPL-SCNC: 113 MMOL/L (ref 96–112)
CHLORIDE SERPL-SCNC: 113 MMOL/L (ref 96–112)
CHLORIDE SERPL-SCNC: 114 MMOL/L (ref 96–112)
CHOLEST SERPL-MCNC: 117 MG/DL (ref 100–199)
CO2 SERPL-SCNC: 10 MMOL/L (ref 20–33)
CO2 SERPL-SCNC: 12 MMOL/L (ref 20–33)
CO2 SERPL-SCNC: 5 MMOL/L (ref 20–33)
CO2 SERPL-SCNC: 9 MMOL/L (ref 20–33)
CO2 SERPL-SCNC: <5 MMOL/L (ref 20–33)
CREAT SERPL-MCNC: 0.51 MG/DL (ref 0.5–1.4)
CREAT SERPL-MCNC: 0.53 MG/DL (ref 0.5–1.4)
CREAT SERPL-MCNC: 0.55 MG/DL (ref 0.5–1.4)
CREAT SERPL-MCNC: 0.58 MG/DL (ref 0.5–1.4)
CREAT SERPL-MCNC: 0.67 MG/DL (ref 0.5–1.4)
EOSINOPHIL # BLD AUTO: 0.03 K/UL (ref 0–0.51)
EOSINOPHIL NFR BLD: 0.4 % (ref 0–6.9)
ERYTHROCYTE [DISTWIDTH] IN BLOOD BY AUTOMATED COUNT: 39 FL (ref 35.9–50)
GLOBULIN SER CALC-MCNC: 2.6 G/DL (ref 1.9–3.5)
GLUCOSE BLD-MCNC: 104 MG/DL (ref 65–99)
GLUCOSE BLD-MCNC: 118 MG/DL (ref 65–99)
GLUCOSE BLD-MCNC: 118 MG/DL (ref 65–99)
GLUCOSE BLD-MCNC: 121 MG/DL (ref 65–99)
GLUCOSE BLD-MCNC: 137 MG/DL (ref 65–99)
GLUCOSE BLD-MCNC: 137 MG/DL (ref 65–99)
GLUCOSE BLD-MCNC: 154 MG/DL (ref 65–99)
GLUCOSE BLD-MCNC: 196 MG/DL (ref 65–99)
GLUCOSE BLD-MCNC: 237 MG/DL (ref 65–99)
GLUCOSE BLD-MCNC: 249 MG/DL (ref 65–99)
GLUCOSE BLD-MCNC: 285 MG/DL (ref 65–99)
GLUCOSE BLD-MCNC: 297 MG/DL (ref 65–99)
GLUCOSE SERPL-MCNC: 133 MG/DL (ref 65–99)
GLUCOSE SERPL-MCNC: 213 MG/DL (ref 65–99)
GLUCOSE SERPL-MCNC: 282 MG/DL (ref 65–99)
GLUCOSE SERPL-MCNC: 326 MG/DL (ref 65–99)
GLUCOSE SERPL-MCNC: 90 MG/DL (ref 65–99)
HCT VFR BLD AUTO: 40.7 % (ref 37–47)
HGB BLD-MCNC: 13.8 G/DL (ref 12–16)
IMM GRANULOCYTES # BLD AUTO: 0.05 K/UL (ref 0–0.11)
IMM GRANULOCYTES NFR BLD AUTO: 0.6 % (ref 0–0.9)
LACTATE BLD-SCNC: 1.1 MMOL/L (ref 0.5–2)
LYMPHOCYTES # BLD AUTO: 1.22 K/UL (ref 1–4.8)
LYMPHOCYTES NFR BLD: 14.6 % (ref 22–41)
MAGNESIUM SERPL-MCNC: 1.6 MG/DL (ref 1.5–2.5)
MAGNESIUM SERPL-MCNC: 1.6 MG/DL (ref 1.5–2.5)
MCH RBC QN AUTO: 29.2 PG (ref 27–33)
MCHC RBC AUTO-ENTMCNC: 33.9 G/DL (ref 33.6–35)
MCV RBC AUTO: 86.2 FL (ref 81.4–97.8)
MONOCYTES # BLD AUTO: 0.52 K/UL (ref 0–0.85)
MONOCYTES NFR BLD AUTO: 6.2 % (ref 0–13.4)
NEUTROPHILS # BLD AUTO: 6.47 K/UL (ref 2–7.15)
NEUTROPHILS NFR BLD: 77.6 % (ref 44–72)
NRBC # BLD AUTO: 0 K/UL
NRBC BLD-RTO: 0 /100 WBC
PHOSPHATE SERPL-MCNC: 1.5 MG/DL (ref 2.5–4.5)
PHOSPHATE SERPL-MCNC: 2.6 MG/DL (ref 2.5–4.5)
PLATELET # BLD AUTO: 233 K/UL (ref 164–446)
PMV BLD AUTO: 10.1 FL (ref 9–12.9)
POTASSIUM SERPL-SCNC: 3.5 MMOL/L (ref 3.6–5.5)
POTASSIUM SERPL-SCNC: 3.6 MMOL/L (ref 3.6–5.5)
POTASSIUM SERPL-SCNC: 4.5 MMOL/L (ref 3.6–5.5)
POTASSIUM SERPL-SCNC: 4.5 MMOL/L (ref 3.6–5.5)
POTASSIUM SERPL-SCNC: 4.8 MMOL/L (ref 3.6–5.5)
PROT SERPL-MCNC: 6.2 G/DL (ref 6–8.2)
RBC # BLD AUTO: 4.72 M/UL (ref 4.2–5.4)
SODIUM SERPL-SCNC: 131 MMOL/L (ref 135–145)
SODIUM SERPL-SCNC: 133 MMOL/L (ref 135–145)
SODIUM SERPL-SCNC: 134 MMOL/L (ref 135–145)
SODIUM SERPL-SCNC: 135 MMOL/L (ref 135–145)
SODIUM SERPL-SCNC: 135 MMOL/L (ref 135–145)
TRIGL SERPL-MCNC: 89 MG/DL (ref 0–149)
WBC # BLD AUTO: 8.3 K/UL (ref 4.8–10.8)

## 2018-06-23 PROCEDURE — 770022 HCHG ROOM/CARE - ICU (200)

## 2018-06-23 PROCEDURE — 99291 CRITICAL CARE FIRST HOUR: CPT | Performed by: INTERNAL MEDICINE

## 2018-06-23 PROCEDURE — 02HV33Z INSERTION OF INFUSION DEVICE INTO SUPERIOR VENA CAVA, PERCUTANEOUS APPROACH: ICD-10-PCS | Performed by: HOSPITALIST

## 2018-06-23 PROCEDURE — C1751 CATH, INF, PER/CENT/MIDLINE: HCPCS

## 2018-06-23 PROCEDURE — 83735 ASSAY OF MAGNESIUM: CPT

## 2018-06-23 PROCEDURE — 80053 COMPREHEN METABOLIC PANEL: CPT

## 2018-06-23 PROCEDURE — 700111 HCHG RX REV CODE 636 W/ 250 OVERRIDE (IP): Performed by: INTERNAL MEDICINE

## 2018-06-23 PROCEDURE — 99233 SBSQ HOSP IP/OBS HIGH 50: CPT | Mod: 25 | Performed by: HOSPITALIST

## 2018-06-23 PROCEDURE — B548ZZA ULTRASONOGRAPHY OF SUPERIOR VENA CAVA, GUIDANCE: ICD-10-PCS | Performed by: HOSPITALIST

## 2018-06-23 PROCEDURE — 700101 HCHG RX REV CODE 250: Performed by: INTERNAL MEDICINE

## 2018-06-23 PROCEDURE — 93010 ELECTROCARDIOGRAM REPORT: CPT | Performed by: INTERNAL MEDICINE

## 2018-06-23 PROCEDURE — 700102 HCHG RX REV CODE 250 W/ 637 OVERRIDE(OP): Performed by: INTERNAL MEDICINE

## 2018-06-23 PROCEDURE — C9113 INJ PANTOPRAZOLE SODIUM, VIA: HCPCS | Performed by: HOSPITALIST

## 2018-06-23 PROCEDURE — 82465 ASSAY BLD/SERUM CHOLESTEROL: CPT

## 2018-06-23 PROCEDURE — 80048 BASIC METABOLIC PNL TOTAL CA: CPT

## 2018-06-23 PROCEDURE — 700105 HCHG RX REV CODE 258: Performed by: INTERNAL MEDICINE

## 2018-06-23 PROCEDURE — 82962 GLUCOSE BLOOD TEST: CPT | Mod: 91

## 2018-06-23 PROCEDURE — 82010 KETONE BODYS QUAN: CPT

## 2018-06-23 PROCEDURE — 71045 X-RAY EXAM CHEST 1 VIEW: CPT

## 2018-06-23 PROCEDURE — 93005 ELECTROCARDIOGRAM TRACING: CPT | Performed by: INTERNAL MEDICINE

## 2018-06-23 PROCEDURE — 84478 ASSAY OF TRIGLYCERIDES: CPT

## 2018-06-23 PROCEDURE — 700111 HCHG RX REV CODE 636 W/ 250 OVERRIDE (IP): Performed by: FAMILY MEDICINE

## 2018-06-23 PROCEDURE — 83605 ASSAY OF LACTIC ACID: CPT

## 2018-06-23 PROCEDURE — 85025 COMPLETE CBC W/AUTO DIFF WBC: CPT

## 2018-06-23 PROCEDURE — 99152 MOD SED SAME PHYS/QHP 5/>YRS: CPT

## 2018-06-23 PROCEDURE — 700111 HCHG RX REV CODE 636 W/ 250 OVERRIDE (IP): Performed by: HOSPITALIST

## 2018-06-23 PROCEDURE — 36556 INSERT NON-TUNNEL CV CATH: CPT | Mod: RT | Performed by: HOSPITALIST

## 2018-06-23 PROCEDURE — 36556 INSERT NON-TUNNEL CV CATH: CPT

## 2018-06-23 PROCEDURE — 700111 HCHG RX REV CODE 636 W/ 250 OVERRIDE (IP)

## 2018-06-23 PROCEDURE — 84100 ASSAY OF PHOSPHORUS: CPT

## 2018-06-23 RX ORDER — SODIUM CHLORIDE 9 MG/ML
2000 INJECTION, SOLUTION INTRAVENOUS ONCE
Status: COMPLETED | OUTPATIENT
Start: 2018-06-23 | End: 2018-06-23

## 2018-06-23 RX ORDER — POTASSIUM CHLORIDE 7.45 MG/ML
10 INJECTION INTRAVENOUS ONCE
Status: COMPLETED | OUTPATIENT
Start: 2018-06-23 | End: 2018-06-23

## 2018-06-23 RX ORDER — MAGNESIUM SULFATE HEPTAHYDRATE 40 MG/ML
4 INJECTION, SOLUTION INTRAVENOUS
Status: COMPLETED | OUTPATIENT
Start: 2018-06-23 | End: 2018-06-24

## 2018-06-23 RX ORDER — DEXTROSE AND SODIUM CHLORIDE 10; .45 G/100ML; G/100ML
INJECTION, SOLUTION INTRAVENOUS CONTINUOUS
Status: DISCONTINUED | OUTPATIENT
Start: 2018-06-23 | End: 2018-06-25

## 2018-06-23 RX ORDER — SODIUM CHLORIDE 9 MG/ML
INJECTION, SOLUTION INTRAVENOUS CONTINUOUS
Status: DISCONTINUED | OUTPATIENT
Start: 2018-06-23 | End: 2018-06-25

## 2018-06-23 RX ORDER — MIDAZOLAM HYDROCHLORIDE 1 MG/ML
INJECTION INTRAMUSCULAR; INTRAVENOUS
Status: COMPLETED
Start: 2018-06-23 | End: 2018-06-23

## 2018-06-23 RX ORDER — MIDAZOLAM HYDROCHLORIDE 1 MG/ML
2-3 INJECTION INTRAMUSCULAR; INTRAVENOUS ONCE
Status: COMPLETED | OUTPATIENT
Start: 2018-06-23 | End: 2018-06-23

## 2018-06-23 RX ORDER — DEXTROSE AND SODIUM CHLORIDE 5; .9 G/100ML; G/100ML
INJECTION, SOLUTION INTRAVENOUS CONTINUOUS
Status: DISCONTINUED | OUTPATIENT
Start: 2018-06-23 | End: 2018-06-25

## 2018-06-23 RX ORDER — INSULIN GLARGINE 100 [IU]/ML
20 INJECTION, SOLUTION SUBCUTANEOUS 2 TIMES DAILY
Status: DISCONTINUED | OUTPATIENT
Start: 2018-06-23 | End: 2018-06-23

## 2018-06-23 RX ORDER — MIDAZOLAM HYDROCHLORIDE 1 MG/ML
2 INJECTION INTRAMUSCULAR; INTRAVENOUS ONCE
Status: COMPLETED | OUTPATIENT
Start: 2018-06-23 | End: 2018-06-23

## 2018-06-23 RX ORDER — LORAZEPAM 2 MG/ML
1 INJECTION INTRAMUSCULAR 4 TIMES DAILY
Status: DISCONTINUED | OUTPATIENT
Start: 2018-06-23 | End: 2018-06-26

## 2018-06-23 RX ORDER — POTASSIUM CHLORIDE 14.9 MG/ML
20 INJECTION INTRAVENOUS ONCE
Status: COMPLETED | OUTPATIENT
Start: 2018-06-23 | End: 2018-06-24

## 2018-06-23 RX ORDER — INSULIN GLARGINE 100 [IU]/ML
10 INJECTION, SOLUTION SUBCUTANEOUS 2 TIMES DAILY
Status: DISCONTINUED | OUTPATIENT
Start: 2018-06-23 | End: 2018-06-23

## 2018-06-23 RX ORDER — POTASSIUM CHLORIDE 7.45 MG/ML
10 INJECTION INTRAVENOUS
Status: DISCONTINUED | OUTPATIENT
Start: 2018-06-23 | End: 2018-06-23

## 2018-06-23 RX ORDER — MAGNESIUM SULFATE HEPTAHYDRATE 40 MG/ML
2 INJECTION, SOLUTION INTRAVENOUS
Status: COMPLETED | OUTPATIENT
Start: 2018-06-23 | End: 2018-06-24

## 2018-06-23 RX ORDER — LORAZEPAM 2 MG/ML
1 INJECTION INTRAMUSCULAR 4 TIMES DAILY
Status: DISCONTINUED | OUTPATIENT
Start: 2018-06-23 | End: 2018-06-23

## 2018-06-23 RX ADMIN — METOCLOPRAMIDE 10 MG: 5 INJECTION, SOLUTION INTRAMUSCULAR; INTRAVENOUS at 21:23

## 2018-06-23 RX ADMIN — PROCHLORPERAZINE EDISYLATE 5 MG: 5 INJECTION INTRAMUSCULAR; INTRAVENOUS at 23:14

## 2018-06-23 RX ADMIN — PANTOPRAZOLE SODIUM 40 MG: 40 INJECTION, POWDER, LYOPHILIZED, FOR SOLUTION INTRAVENOUS at 08:01

## 2018-06-23 RX ADMIN — MORPHINE SULFATE 3 MG: 4 INJECTION INTRAVENOUS at 19:30

## 2018-06-23 RX ADMIN — PROCHLORPERAZINE EDISYLATE 5 MG: 5 INJECTION INTRAMUSCULAR; INTRAVENOUS at 15:00

## 2018-06-23 RX ADMIN — PANTOPRAZOLE SODIUM 40 MG: 40 INJECTION, POWDER, LYOPHILIZED, FOR SOLUTION INTRAVENOUS at 21:24

## 2018-06-23 RX ADMIN — DIPHENHYDRAMINE HYDROCHLORIDE 50 MG: 50 INJECTION, SOLUTION INTRAMUSCULAR; INTRAVENOUS at 17:12

## 2018-06-23 RX ADMIN — SODIUM CHLORIDE 4 UNITS/HR: 9 INJECTION, SOLUTION INTRAVENOUS at 09:28

## 2018-06-23 RX ADMIN — METOCLOPRAMIDE 10 MG: 5 INJECTION, SOLUTION INTRAMUSCULAR; INTRAVENOUS at 10:55

## 2018-06-23 RX ADMIN — MIDAZOLAM HYDROCHLORIDE 2 MG: 1 INJECTION, SOLUTION INTRAMUSCULAR; INTRAVENOUS at 17:48

## 2018-06-23 RX ADMIN — DEXTROSE AND SODIUM CHLORIDE: 10; .45 INJECTION, SOLUTION INTRAVENOUS at 14:33

## 2018-06-23 RX ADMIN — LORAZEPAM 1 MG: 2 INJECTION INTRAMUSCULAR; INTRAVENOUS at 19:22

## 2018-06-23 RX ADMIN — ENOXAPARIN SODIUM 40 MG: 100 INJECTION SUBCUTANEOUS at 21:22

## 2018-06-23 RX ADMIN — MORPHINE SULFATE 3 MG: 4 INJECTION INTRAVENOUS at 07:11

## 2018-06-23 RX ADMIN — ONDANSETRON 4 MG: 2 INJECTION INTRAMUSCULAR; INTRAVENOUS at 15:54

## 2018-06-23 RX ADMIN — SODIUM CHLORIDE: 9 INJECTION, SOLUTION INTRAVENOUS at 09:28

## 2018-06-23 RX ADMIN — POTASSIUM PHOSPHATE, MONOBASIC AND POTASSIUM PHOSPHATE, DIBASIC 30 MMOL: 224; 236 INJECTION, SOLUTION INTRAVENOUS at 19:57

## 2018-06-23 RX ADMIN — POTASSIUM CHLORIDE 10 MEQ: 7.46 INJECTION, SOLUTION INTRAVENOUS at 15:00

## 2018-06-23 RX ADMIN — ONDANSETRON 4 MG: 2 INJECTION INTRAMUSCULAR; INTRAVENOUS at 10:22

## 2018-06-23 RX ADMIN — MIDAZOLAM HYDROCHLORIDE 2 MG: 1 INJECTION, SOLUTION INTRAMUSCULAR; INTRAVENOUS at 11:58

## 2018-06-23 RX ADMIN — LORAZEPAM 1 MG: 2 INJECTION INTRAMUSCULAR; INTRAVENOUS at 13:00

## 2018-06-23 RX ADMIN — MORPHINE SULFATE 3 MG: 4 INJECTION INTRAVENOUS at 02:57

## 2018-06-23 RX ADMIN — MIDAZOLAM HYDROCHLORIDE 2 MG: 1 INJECTION INTRAMUSCULAR; INTRAVENOUS at 11:58

## 2018-06-23 RX ADMIN — DIPHENHYDRAMINE HYDROCHLORIDE 50 MG: 50 INJECTION, SOLUTION INTRAMUSCULAR; INTRAVENOUS at 06:31

## 2018-06-23 RX ADMIN — METOCLOPRAMIDE 10 MG: 5 INJECTION, SOLUTION INTRAMUSCULAR; INTRAVENOUS at 06:31

## 2018-06-23 RX ADMIN — DIPHENHYDRAMINE HYDROCHLORIDE 50 MG: 50 INJECTION, SOLUTION INTRAMUSCULAR; INTRAVENOUS at 11:00

## 2018-06-23 RX ADMIN — POTASSIUM CHLORIDE 10 MEQ: 7.46 INJECTION, SOLUTION INTRAVENOUS at 09:00

## 2018-06-23 RX ADMIN — MORPHINE SULFATE 3 MG: 4 INJECTION INTRAVENOUS at 15:58

## 2018-06-23 RX ADMIN — PROCHLORPERAZINE EDISYLATE 5 MG: 5 INJECTION INTRAMUSCULAR; INTRAVENOUS at 06:30

## 2018-06-23 RX ADMIN — SODIUM CHLORIDE 2000 ML: 9 INJECTION, SOLUTION INTRAVENOUS at 06:55

## 2018-06-23 RX ADMIN — METOCLOPRAMIDE 10 MG: 5 INJECTION, SOLUTION INTRAMUSCULAR; INTRAVENOUS at 17:12

## 2018-06-23 RX ADMIN — POTASSIUM CHLORIDE 20 MEQ: 200 INJECTION, SOLUTION INTRAVENOUS at 23:15

## 2018-06-23 RX ADMIN — MORPHINE SULFATE 3 MG: 4 INJECTION INTRAVENOUS at 11:05

## 2018-06-23 RX ADMIN — DEXTROSE AND SODIUM CHLORIDE: 5; 900 INJECTION, SOLUTION INTRAVENOUS at 10:51

## 2018-06-23 RX ADMIN — ONDANSETRON 4 MG: 2 INJECTION INTRAMUSCULAR; INTRAVENOUS at 04:51

## 2018-06-23 ASSESSMENT — ENCOUNTER SYMPTOMS
ROS GI COMMENTS: VOMITING BLOOD
RESPIRATORY NEGATIVE: 1
EYES NEGATIVE: 1
FEVER: 0
NAUSEA: 1
DIARRHEA: 0
CARDIOVASCULAR NEGATIVE: 1
CONSTITUTIONAL NEGATIVE: 1
MUSCULOSKELETAL NEGATIVE: 1
ABDOMINAL PAIN: 1
VOMITING: 1
BLOOD IN STOOL: 0
COUGH: 0
PSYCHIATRIC NEGATIVE: 1
CHILLS: 0
HEARTBURN: 0
NEUROLOGICAL NEGATIVE: 1
CONSTIPATION: 0

## 2018-06-23 ASSESSMENT — PAIN SCALES - GENERAL
PAINLEVEL_OUTOF10: 0
PAINLEVEL_OUTOF10: 9
PAINLEVEL_OUTOF10: 8
PAINLEVEL_OUTOF10: 7
PAINLEVEL_OUTOF10: 0
PAINLEVEL_OUTOF10: 0
PAINLEVEL_OUTOF10: 9
PAINLEVEL_OUTOF10: 6
PAINLEVEL_OUTOF10: 3
PAINLEVEL_OUTOF10: 4
PAINLEVEL_OUTOF10: 7
PAINLEVEL_OUTOF10: 5

## 2018-06-23 NOTE — PROGRESS NOTES
Patient arrived to SICU 114 with ALCS RN. Patient A&OX4, VSS. 2 RN skin assessment complete. Family updated. MD aware and new orders in place. RN will continue to monitor.

## 2018-06-23 NOTE — PROGRESS NOTES
Pt is A/O x 4. C/O of abdominal pain. Pt assessed and scheduled and PRN medication administered per MAR. Pt is complaining of N/V and emesis is dark green. Per pt, pt states this is normal for her. Needs addressed at this time. Pt is not able tolerate PO meds due to the vomiting. Educated pt to call for assistance using call light, pt verbalized understanding. Pt is not able to talk too much, due to the N/V. Bed in lowest position and locked. Call light and belongings within reach.

## 2018-06-23 NOTE — PROGRESS NOTES
Pt tolerated some PO medications this morning. She is still feeling nauseous throughout the shift. She vomited a few times this afternoon. She is still not tolerating PO diet. She had a couple of bites of soup for lunch, but became nauseous. VSS.

## 2018-06-23 NOTE — CARE PLAN
Problem: Communication  Goal: The ability to communicate needs accurately and effectively will improve  Outcome: PROGRESSING AS EXPECTED  Encouraged pt to express needs to RN. Pt verbalized understanding.    Problem: Pain Management  Goal: Pain level will decrease to patient's comfort goal  Outcome: PROGRESSING AS EXPECTED  Pt c/o of abdominal pain. RN assessed and discussed pain management with pt. Pt states the pain is not from the N/V. PRN morphine IV administered per MAR.

## 2018-06-23 NOTE — PROGRESS NOTES
Pulmonary Critical Care Progress Note        DOA: 6/13/2018    Chief Complaint: DKA    History of Present Illness: 27 y.o. female with a known   recurrent history of uncontrolled diabetes mellitus.  The patient was just   here on Monday and discharged recently.  She was noted to have a blood sugar   of over 400 and had been unable to keep any food down or fluids.  She states   that when she was here and discharged on Monday, at that time, she had a   central line and has had a scar with abrasion to her neck region from the line   removal.  No evidence that she had shingles, though the wound appears to be   somewhat vesicular in nature.  Nevertheless, the patient stated that she was   unable to take any oral medications and recently had an EGD done on   06/09/2018, which showed erosive esophagitis and gastritis as well as pyloric   stenosis.  She had a dilatation done as well.     The patient was discharged on PPI and Carafate, but she was unable to take.    She states that she is being evaluated with a new endocrinologist next week in   regards to possible insulin pump candidate.  She states that when she   arrived, she was having dysuria, polyuria, and still nausea, and vomiting.    She was placed on a diabetic ketoacidosis protocol with an insulin infusion.    She had Accu-Chek, it was over 500 with a high anion gap acidosis with ketones   bodies.  UA was negative for urine infection.    ROS:  Respiratory: negative pleuritic chest pain and negative shortness of breath, Cardiac: negative chest pain and negative palpitations, GI: positive nausea, positive vomiting, positive abdominal pain and positive constipation.  All other systems reviewed with patient and are negative.    Interval Events:  24 hour interval history reviewed    - transferred back to ICU last night for recurrent DKA --> back on DKA protocol   - vomiting again, no BM x 10 days   - bicarb dropped to 5 with AG 18, glucose 326    - PICC today   - sinus  tach   - AF, nl WBC   - morphine q 4   - Mag and phos pending    PFSH:  No change.    Respiratory:   Room air  Pulse Oximetry: 99 %          Exam: unlabored respirations, no intercostal retractions or accessory muscle use and clear to auscultation without rales or wheezes  ImagingCXR  I have personally reviewed the chest x-ray my impression is   (compared to prior film) no film today        Invalid input(s): [id number]    HemoDynamics:  Pulse: (!) 134, Heart Rate (Monitored): (!) 133  Blood Pressure: 123/75, NIBP: 125/70       Exam: no murmur, regular rhythm (Sinus), sinus tachycardia  Imaging: Available data reviewed        Neuro:  GCS  15       Exam: frustrated and tearful no focal deficits noted mental status intact oriented for age x3  Imaging: Available data reviewed    Fluids:  Intake/Output       06/21/18 0700 - 06/22/18 0659 06/22/18 0700 - 06/23/18 0659 06/23/18 0700 - 06/24/18 0659      9028-3523 1956-1200 Total 9383-9411 4877-1155 Total 7053-1260 6107-7273 Total       Intake    P.O.  0  -- 0  0  120 120  --  -- --    P.O. 0 -- 0 0 120 120 -- -- --    I.V.  --  -- --  --  -- --  2000  -- 2000    IV Volume (NSS) -- -- -- -- -- -- 2000 -- 2000    Total Intake 0 -- 0 0  -- 2000       Output    Urine  --  -- --  --  -- --  --  -- --    Number of Times Voided 1 x -- 1 x 1 x 1 x 2 x -- -- --    Emesis  --  550 550  200  950 1150  100  -- 100    Emesis -- 550 550  100 -- 100    Emesis - Number of Times 4 x -- 4 x 1 x -- 1 x -- -- --    Total Output -- 550 550  100 -- 100       Net I/O     0 -550 -550 -200 -830 -1030 1900 -- 1900           Recent Labs      06/21/18   1138  06/22/18   0324  06/23/18   0406  06/23/18   0727   SODIUM   --   133*  133*  131*   POTASSIUM   --   4.0  4.5  4.5   CHLORIDE   --   104  106  108   CO2   --   16*  9*  5*   BUN   --   5*  5*  5*   CREATININE   --   0.51  0.55  0.53   MAGNESIUM  1.5  1.6   --    --    CALCIUM   --   8.6  8.8  8.6        GI/Nutrition:  Exam: Hypoactive bowel sounds, mild epigastric tenderness, no peritoneal signs, abdomen is soft and non-tender  Imaging: Available data reviewed -last KUB 6/15, upper GI series   NPO  Liver Function  Recent Labs      18   1138  18   0324  18   0406  18   0727   LIPASE  42   --    --    --    GLUCOSE   --   199*  282*  326*       Heme:  Recent Labs      18   0406  18   0324  18   0406   RBC  4.23  4.19*  4.72   HEMOGLOBIN  12.1  12.2  13.8   HEMATOCRIT  35.2*  35.4*  40.7   PLATELETCT  200  204  233       Infectious Disease:  Temp  Av.6 °C (97.8 °F)  Min: 36.2 °C (97.1 °F)  Max: 36.9 °C (98.5 °F)  Micro: reviewed None this admission  Recent Labs      18   0406  18   0406   WBC  4.8  5.0  8.3   NEUTSPOLYS  55.20  61.20  77.60*   LYMPHOCYTES  33.00  28.30  14.60*   MONOCYTES  7.70  8.50  6.20   EOSINOPHILS  2.50  1.20  0.40   BASOPHILS  1.20  0.40  0.60     Current Facility-Administered Medications   Medication Dose Frequency Provider Last Rate Last Dose   • dextrose 10% and 0.45% NaCl infusion   Continuous Susie Young M.D.       • MD ALERT-PHARMACY TO CONSULT FOR DKA MONITORING 1 Each  1 Each PRN Susie Young M.D.       • magnesium sulfate IVPB premix 2 g  2 g Once PRN Susie Young M.D.        Or   • magnesium sulfate IVPB premix 4 g  4 g Once PRN Susie Young M.D.       • potassium phosphates 30 mmol in  mL ivpb  30 mmol Once PRN Susie Young M.D.        Or   • sodium phosphate 30 mmol in 1/2  mL ivpb  30 mmol Once PRN Susie Young M.D.       • Adult DKA potassium(K+) replacement scale  1 Each Q4HRS Susie Young M.D.       • NS infusion   Continuous Susie Young M.D.       • D5 NS infusion   Continuous Susie Young M.D.       • MD ALERT-INSULIN DRIP INITIAL RATE  BY PHARMACY AT 0.05 UNITS/KG/HR 1 Each  1 Each PRN Susie Young M.D.       • insulin regular human (HUMULIN/NOVOLIN R) 62.5 Units in  mL Infusion for DKA  4 Units/hr Continuous Cholo Alan Jr. D.O.       • insulin glargine (LANTUS) injection 10 Units  10 Units BID Arnulfo Gavin D.O.       • diphenhydrAMINE (BENADRYL) injection 50 mg  50 mg Q6HRS Lior Child M.D.   50 mg at 06/23/18 0631   • 0.9 % NaCl with KCl 20 mEq infusion   Continuous Arnulfo Gavin D.OHollis 100 mL/hr at 06/22/18 2222     • OLANZapine (ZYPREXA) tablet 5 mg  5 mg Q EVENING Arnulfo Gavin D.O.   Stopped at 06/22/18 2159   • morphine (pf) 4 mg/ml injection 3 mg  3 mg Q4HRS PRN Arnulfo Gavin D.O.   3 mg at 06/23/18 0711   • ketorolac (TORADOL) injection 30 mg  30 mg Q6HRS PRN Arnulfo Gavin D.O.   30 mg at 06/22/18 0459   • metoclopramide (REGLAN) injection 10 mg  10 mg 4X/DAY ACHS Arnulfo Gavin D.O.   10 mg at 06/23/18 0631   • prochlorperazine (COMPAZINE) injection 5 mg  5 mg Q8HRS Pa Urbina M.D.   5 mg at 06/23/18 0630   • pantoprazole (PROTONIX) injection 40 mg  40 mg BID Pa Urbina M.D.   40 mg at 06/23/18 0801   • glucose 4 g chewable tablet 16 g  16 g Q15 MIN PRN Pa Urbina M.D.        And   • dextrose 50% (D50W) injection 25 mL  25 mL Q15 MIN PRN Pa Urbina M.D.   25 mL at 06/19/18 1646   • enoxaparin (LOVENOX) inj 40 mg  40 mg QHS Pa Urbina M.D.   40 mg at 06/22/18 2157   • senna-docusate (PERICOLACE or SENOKOT S) 8.6-50 MG per tablet 2 Tab  2 Tab BID Shara Hurd M.D.   Stopped at 06/13/18 0900    And   • polyethylene glycol/lytes (MIRALAX) PACKET 1 Packet  1 Packet QDAY PRN Shara Hurd M.D.        And   • magnesium hydroxide (MILK OF MAGNESIA) suspension 30 mL  30 mL QDAY PRN Shara Hurd M.D.        And   • bisacodyl (DULCOLAX) suppository 10 mg  10 mg QDAY PRN Shara Hurd M.D.       • labetalol (NORMODYNE,TRANDATE) injection 10 mg  10 mg Q4HRS  PRN Shara Hurd M.D.       • ondansetron (ZOFRAN) syringe/vial injection 4 mg  4 mg Q4HRS PRRONALD Hurd M.D.   4 mg at 06/23/18 0451   • ondansetron (ZOFRAN ODT) dispertab 4 mg  4 mg Q4HRS PRN Shara Hurd M.D.       • prochlorperazine (COMPAZINE) injection 5-10 mg  5-10 mg Q4HRS PRN Shara Hurd M.D.   10 mg at 06/22/18 0101   • acetaminophen (TYLENOL) tablet 650 mg  650 mg Q6HRS PRN Shara Hurd M.D.       • atorvastatin (LIPITOR) tablet 20 mg  20 mg DAILY Shara Hurd M.D.   20 mg at 06/22/18 0852   • cholecalciferol (VITAMIN D3) tablet 400 Units  400 Units DAILY Shara Hurd M.D.   400 Units at 06/22/18 0852   • gabapentin (NEURONTIN) capsule 100 mg  100 mg BID Shara Hurd M.D.   Stopped at 06/22/18 2159   • sucralfate (CARAFATE) 1 GM/10ML suspension 1 g  1 g Q6HRS Shara Hurd M.D.   1 g at 06/21/18 1723     Last reviewed on 6/13/2018  8:14 AM by Blade Darden    Quality  Measures:   Medications reviewed, Labs reviewed, Radiology images reviewed and EKG reviewed   Pitt catheter: Critically Ill - Requiring Accurate Measurement of Urinary Output       DVT Prophylaxis: Enoxaparin (Lovenox)   DVT prophylaxis - mechanical: SCDs   Ulcer prophylaxis: Yes     Assessed for rehab: Patient unable to tolerate rehabilitation therapeutic regimen      Assessment/Plan:  Recurrent acute diabetic ketoacidosis with h/o poorly controlled DMI   - Continue DKA protocol with insulin drip and aggressive fluid resuscitation   - Close electrolyte monitoring and repletion   - N.p.o.   Severe gastroparesis s/p EGD and pyloric dilation for possible gastric outlet obstruction   - GI following, cont pro-motility agents   - KUB today, consider relistor    - start ativan today   - NGT to LCWS   - coretrak to SB to start TFs once DKA resolves again  Poor IV access   - PICC ordered, ok with arterial sticks by lab if needed   Severe protein calorie following malnutrition - NPO  currently but eventual SB TFs and monitor closely for re-feeding syndrome  Constipation - bowel protocol  History of previous pneumothorax and I believe a chest tube placement after central line placement remotely  Prophylaxis, NPO    Patient is critically ill today requiring my active management of her insulin drip as well as gastrointestinal management and feeding tube discussion.    Discussed patient condition and risk of morbidity and/or mortality with RN, RT, Pharmacy, Charge nurse / hot rounds, QA team, Patient and GI and hospitalist.    The patient remains critically ill.  Critical care time = 35 minutes in directly providing and coordinating critical care and extensive data review.  No time overlap and excludes procedures.

## 2018-06-23 NOTE — PROGRESS NOTES
Charles  from Lab called with critical result of CO2 of 9 at 0500. Critical lab result read back to Charles.   Dr. Young notified of critical lab result at 0503 .  Critical lab result read back by Dr. Young.    New ordered placed. MD stated pt will be transferred to ICU.

## 2018-06-23 NOTE — PROGRESS NOTES
Nursing staff is unable to advance NG tube and cortrak is unable to be advanced post pyloric. Dr. Acuña aware

## 2018-06-23 NOTE — PROGRESS NOTES
Silverio from Lab called with critical result of CO2 at less than 5. Critical lab result read back to Cristel.   Dr. Gonda notified of critical lab result.  Critical lab result read back by Dr. Gonda.

## 2018-06-23 NOTE — PROGRESS NOTES
[facility] Hospitalist Progress Note    Date of Service: 2018    Chief Complaint  27 y.o. female admitted 2018 with vomiting    Interval Problem Update  Ms. Sparks has a history of IDDM and gastroparesis that presented to the ER with vomiting and abdominal pain and was admitted to the ICU with DKA. She has been followed by GI due to her hx of ulcerative esophagitis noted on EGD .  On , labs revealed recurrent DKA thus she has been admitted back to the ICU for IV fluids and an insulin drip.  He has not been able to tolerate a diet in 10 days.  Her venous access is very poor. She is on room air.   I discussed with Dr. Child and he recommends TPN and wants to wait on EGD for placement of a cortrak or surgical J tube. I discussed the possibility of a port with Ms. Sparks.  Consultants/Specialty  GI   Critical care    Disposition  ICU        Review of Systems   Constitutional: Negative for chills and fever.   Respiratory: Negative for cough.    Gastrointestinal: Positive for nausea.        Vomiting blood   All other systems reviewed and are negative.     Physical Exam  Laboratory/Imaging   Hemodynamics  Temp (24hrs), Av.6 °C (97.8 °F), Min:36.2 °C (97.1 °F), Max:36.9 °C (98.5 °F)   Temperature: 36.9 °C (98.5 °F)  Pulse  Av.1  Min: 60  Max: 143 Heart Rate (Monitored): (!) 135  Blood Pressure: 123/75, NIBP: 129/69      Respiratory      Respiration: (!) 42, Pulse Oximetry: 99 %        RUL Breath Sounds: Clear, RML Breath Sounds: Clear, RLL Breath Sounds: Diminished, NANCY Breath Sounds: Clear, LLL Breath Sounds: Diminished    Fluids    Intake/Output Summary (Last 24 hours) at 18 0721  Last data filed at 18 0600   Gross per 24 hour   Intake              120 ml   Output             1150 ml   Net            -1030 ml       Nutrition  Orders Placed This Encounter   Procedures   • Diet NPO     Standing Status:   Standing     Number of Occurrences:   1     Order Specific Question:   Restrict to:      Answer:   Sips with Medications [3]     Physical Exam   Constitutional: She is oriented to person, place, and time. She appears distressed.   Cardiovascular: Normal rate and regular rhythm.    No murmur heard.  Musculoskeletal: She exhibits no edema or tenderness.   Neurological: She is alert and oriented to person, place, and time.   Skin: Skin is warm. She is not diaphoretic. There is pallor.   Psychiatric:   Flat affect   Nursing note and vitals reviewed.      Recent Labs      06/21/18   0406  06/22/18   0324  06/23/18   0406   WBC  4.8  5.0  8.3   RBC  4.23  4.19*  4.72   HEMOGLOBIN  12.1  12.2  13.8   HEMATOCRIT  35.2*  35.4*  40.7   MCV  83.2  84.5  86.2   MCH  28.6  29.1  29.2   MCHC  34.4  34.5  33.9   RDW  38.0  37.6  39.0   PLATELETCT  200  204  233   MPV  10.8  10.3  10.1     Recent Labs      06/21/18   0406  06/22/18   0324  06/23/18   0406   SODIUM  139  133*  133*   POTASSIUM  3.5*  4.0  4.5   CHLORIDE  104  104  106   CO2  22  16*  9*   GLUCOSE  149*  199*  282*   BUN  6*  5*  5*   CREATININE  0.45*  0.51  0.55   CALCIUM  8.6  8.6  8.8                      Assessment/Plan     * Diabetic ketoacidosis (HCC)- (present on admission)   Assessment & Plan    Present upon admit and was treated with transfer out of the ICU.  On 6/23 she went back into DKA with an anion gap of 18 and bicarb of 9.  She has been restarted on IV fluids and an insulin drip  Latest Hgb A1C is 8.8.  Due to poor IV access, she may be a candidate for a port.         Intractable nausea and vomiting- (present on admission)   Assessment & Plan      May be due to cyclic vomiting syndrome  Continue Reglan and Compazine  Continue IV Protonix          Gastroparesis due to DM (HCC)- (present on admission)   Assessment & Plan    Scheduled Reglan with each meal and Compazine  IV fluids for hydration  A cortrak has not been able to be placed in the small bowel   She has not been able to tolerate any oral intake.  TPN will be ordered  She may  be a candidate for a surgical J tube.        Esophagitis- (present on admission)   Assessment & Plan    And gastritis as well as pyloric stenosis seen on EGD done on 6/9/18.  Continue Carafate and IV Protonix        Type 1 diabetes mellitus (HCC)- (present on admission)   Assessment & Plan    Previously HbA1c 8.8.          Hypomagnesemia   Assessment & Plan    Magnesium has been replentished.         Hypokalemia- (present on admission)   Assessment & Plan    -- change iv fluid to normal saline with KCl 20 meq as the patient has hyponatremia now.  -- monitor level.        Pyloric stenosis   Assessment & Plan    Status post dilatation on 6/9.   GI following           Quality-Core Measures   Reviewed items::  Labs reviewed and Medications reviewed  DVT prophylaxis pharmacological::  Enoxaparin (Lovenox)

## 2018-06-23 NOTE — PROGRESS NOTES
Pt transferred to ICU, RN gave report to ICU RNs.  Pt's belongings, chart and medications went with pt.

## 2018-06-23 NOTE — PROGRESS NOTES
Gastroenterology Progress Note     Author: Lior DIANA Child   Date & Time Created: 6/23/2018 12:19 PM    Chief Complaint:  Nausea/vomiting/abdominal pain    Interval History:  6/22/2018: Only 1 episode of vomiting in the last 24 hours.  Still have ongoing epigastric pain. Epigastric without trigger, recurrent not associated with food. No BM since admission. Passing flatus.  6/23/2018: Transferred to ICU due to low bicarbonate to start DKA protocol.    Review of Systems:  Review of Systems   Constitutional: Negative.    HENT: Negative.    Eyes: Negative.    Respiratory: Negative.    Cardiovascular: Negative.    Gastrointestinal: Positive for abdominal pain, nausea and vomiting. Negative for blood in stool, constipation, diarrhea, heartburn and melena.   Genitourinary: Negative.    Musculoskeletal: Negative.    Skin: Negative.    Neurological: Negative.    Endo/Heme/Allergies: Negative.    Psychiatric/Behavioral: Negative.        Physical Exam:  Physical Exam   Constitutional: She is oriented to person, place, and time. She appears well-developed and well-nourished. She appears distressed.   HENT:   Head: Normocephalic and atraumatic.   Eyes: Conjunctivae are normal. Pupils are equal, round, and reactive to light.   Neck: Normal range of motion. Neck supple. No JVD present. No tracheal deviation present. No thyromegaly present.   Cardiovascular: Normal rate and regular rhythm.    Pulmonary/Chest: Effort normal and breath sounds normal. No stridor.   Abdominal: Soft. Bowel sounds are normal. She exhibits no distension and no mass. There is tenderness. There is no rebound and no guarding.   Musculoskeletal: Normal range of motion.   Lymphadenopathy:     She has no cervical adenopathy.   Neurological: She is alert and oriented to person, place, and time.   Skin: Skin is warm and dry.       Labs:        Invalid input(s): [id number]      Recent Labs      06/21/18   1138  06/22/18   0324  06/23/18   0406  06/23/18   0705    SODIUM   --   133*  133*  131*   POTASSIUM   --   4.0  4.5  4.5   CHLORIDE   --   104  106  108   CO2   --   16*  9*  5*   BUN   --   5*  5*  5*   CREATININE   --   0.51  0.55  0.53   MAGNESIUM  1.5  1.6   --   1.6   PHOSPHORUS   --    --    --   2.6   CALCIUM   --   8.6  8.8  8.6     Recent Labs      18   1138  18   0324  18   0406  18   0727   LIPASE  42   --    --    --    GLUCOSE   --   199*  282*  326*     Recent Labs      18   0406  18   0324  18   0406   RBC  4.23  4.19*  4.72   HEMOGLOBIN  12.1  12.2  13.8   HEMATOCRIT  35.2*  35.4*  40.7   PLATELETCT  200  204  233     Recent Labs      18   0406  18   0324  18   0406   WBC  4.8  5.0  8.3   NEUTSPOLYS  55.20  61.20  77.60*   LYMPHOCYTES  33.00  28.30  14.60*   MONOCYTES  7.70  8.50  6.20   EOSINOPHILS  2.50  1.20  0.40   BASOPHILS  1.20  0.40  0.60     Hemodynamics:  Temp (24hrs), Av.6 °C (97.8 °F), Min:36.2 °C (97.1 °F), Max:36.9 °C (98.5 °F)  Temperature: 36.9 °C (98.5 °F)  Pulse  Av.2  Min: 60  Max: 166Heart Rate (Monitored): (!) 166  Blood Pressure: 123/75, NIBP: 140/82     Respiratory:    Respiration: (!) 24, Pulse Oximetry: 99 %        RUL Breath Sounds: Clear, RML Breath Sounds: Clear, RLL Breath Sounds: Diminished, NANCY Breath Sounds: Clear, LLL Breath Sounds: Diminished  Fluids:    Intake/Output Summary (Last 24 hours) at 18 1556  Last data filed at 18 1400   Gross per 24 hour   Intake                0 ml   Output              750 ml   Net             -750 ml        GI/Nutrition:  Orders Placed This Encounter   Procedures   • Diet NPO     Standing Status:   Standing     Number of Occurrences:   1     Order Specific Question:   Restrict to:     Answer:   Sips with Medications [3]     Medical Decision Making, by Problem:  Active Hospital Problems    Diagnosis   • *Diabetic ketoacidosis (HCC) [E13.10]   • Intractable nausea and vomiting [R11.2]   • Gastroparesis due to  DM (HCC) [E11.43, K31.84]   • Esophagitis [K20.9]   • Type 1 diabetes mellitus (HCC) [E10.9]   • Pyloric stenosis [K31.1]   • Anemia [D64.9]   • Hypomagnesemia [E83.42]   • Hypokalemia [E87.6]     EGD 6/9/2018  IMPRESSIONS AND FINDINGS:  1.  Irving classification grade C ulcerative esophagitis -- likely the   result the patient's nausea and vomiting.  2.  Gastritis, suspected.  3.  Pyloric stenosis, possible, status post dilation.           Imaging:  UGI 6/19/2018  Examination is significantly limited as the patient vomited the majority of ingested contrast and refused to take more contrast by mouth.  No evidence of gastric outlet obstruction.  There may be mild gastric fold thickening which can be seen in the setting of gastritis.  No evidence of malrotation.     KUB 6/15/2018  No evidence for bowel obstruction.        HIDA 6/10/2018  1. Normal hepatobiliary scan. No evidence of acute cholecystitis.  2. Normal gallbladder ejection fraction.     US 6/9/2018  Heterogeneously echogenic lesion within the right lobe of the liver measuring 3 x 2.8 x 2.2 cm may represent a hemangioma and was seen on prior ultrasound. Confirmation can be obtained with hepatic protocol MRI.  Hepatomegaly.  No evidence of gallstones or biliary ductal dilatation.     CT 6/3/2018  1.  Thickening of the distal esophageal wall, new since prior study, consider component of esophagitis. Could be followed up with endoscopy for further evaluation as clinically appropriate.  2.  Hepatomegaly  Impressions:  1. Intractable nausea/vomiting  2. Insulin dependent Type 1 Diabetes  3. Diabetic ketoacidosis, resolved  4. Abdominal pain  5. Possible gastroparesis  6. Metabolic acidosis     27-year-old female with history of type I diabetes, diabetic ketoacidosis who is being seen today for intractable nausea vomiting. Questionable history of gastroparesis. Symptom persists despite being on erythromycin 500mg, Neurontin 100 mg, ketorolac, Reglan, Zofran,  Compazine. In regards to MAR, refusing some medication by oral intake due to nausea. While gastroparesis is possible, other consideration would be cyclic vomiting syndrome due to persistent nausea with intermittent resolution of symptoms. Current treatment aimed as possible CVS exacerbation.     Recommendations:  1. Start Ativan 1mg IV every 6 hours scheduled  2. Continue Benadryl 50mg scheduled. Please monitor for respiratory depression when combined with Ativan.  2. Continue Reglan, compazine. Continue ketorolac  3. Dark quiet room without frequent disturbance of patient.   4. Consideration to start amitriptyline 25mg QHS, Coezyme Q10 with 200mg BID, L-carnitine 1g BID once oral intake improves.  5. May need to consider droperidol. Pain medication with dilaudid if persistent pain.   6. KUB to assess for burden.  7. Feeding tube (ideally post pyloric)       Quality-Core Measures

## 2018-06-23 NOTE — CARE PLAN
Problem: Bowel/Gastric:  Goal: Normal bowel function is maintained or improved  Outcome: NOT MET  Attempted placement of NG tube and cortrak which were unsuccessful. Dr. Acuña made aware. Educated pt about diet, fluids, medications and activity to promote bowel function. Administer bowel prep once tubes can be successfully placed.     Problem: Skin Integrity  Goal: Risk for impaired skin integrity will decrease  Outcome: PROGRESSING AS EXPECTED  Pt skin is intact. Assess for risk factors for impaired skin integrity and pressure ulcer, skin temperature and appearances. Implement precautions to protect skin integrity.

## 2018-06-24 LAB
ALBUMIN SERPL BCP-MCNC: 3.3 G/DL (ref 3.2–4.9)
ALBUMIN/GLOB SERPL: 1.4 G/DL
ALP SERPL-CCNC: 85 U/L (ref 30–99)
ALT SERPL-CCNC: 11 U/L (ref 2–50)
ANION GAP SERPL CALC-SCNC: 4 MMOL/L (ref 0–11.9)
ANION GAP SERPL CALC-SCNC: 6 MMOL/L (ref 0–11.9)
ANION GAP SERPL CALC-SCNC: 7 MMOL/L (ref 0–11.9)
ANION GAP SERPL CALC-SCNC: 9 MMOL/L (ref 0–11.9)
AST SERPL-CCNC: 10 U/L (ref 12–45)
B-OH-BUTYR SERPL-MCNC: 0.08 MMOL/L (ref 0.02–0.27)
B-OH-BUTYR SERPL-MCNC: 0.66 MMOL/L (ref 0.02–0.27)
BASOPHILS # BLD AUTO: 0.6 % (ref 0–1.8)
BASOPHILS # BLD: 0.03 K/UL (ref 0–0.12)
BILIRUB SERPL-MCNC: 0.6 MG/DL (ref 0.1–1.5)
BUN SERPL-MCNC: 3 MG/DL (ref 8–22)
BUN SERPL-MCNC: 4 MG/DL (ref 8–22)
CALCIUM SERPL-MCNC: 7.7 MG/DL (ref 8.5–10.5)
CALCIUM SERPL-MCNC: 8.1 MG/DL (ref 8.5–10.5)
CALCIUM SERPL-MCNC: 8.2 MG/DL (ref 8.5–10.5)
CALCIUM SERPL-MCNC: 8.2 MG/DL (ref 8.5–10.5)
CALCIUM SERPL-MCNC: 8.3 MG/DL (ref 8.5–10.5)
CALCIUM SERPL-MCNC: 8.4 MG/DL (ref 8.5–10.5)
CHLORIDE SERPL-SCNC: 112 MMOL/L (ref 96–112)
CHLORIDE SERPL-SCNC: 113 MMOL/L (ref 96–112)
CHLORIDE SERPL-SCNC: 115 MMOL/L (ref 96–112)
CHLORIDE SERPL-SCNC: 116 MMOL/L (ref 96–112)
CO2 SERPL-SCNC: 13 MMOL/L (ref 20–33)
CO2 SERPL-SCNC: 13 MMOL/L (ref 20–33)
CO2 SERPL-SCNC: 14 MMOL/L (ref 20–33)
CO2 SERPL-SCNC: 14 MMOL/L (ref 20–33)
CO2 SERPL-SCNC: 15 MMOL/L (ref 20–33)
CO2 SERPL-SCNC: 15 MMOL/L (ref 20–33)
CREAT SERPL-MCNC: 0.42 MG/DL (ref 0.5–1.4)
CREAT SERPL-MCNC: 0.45 MG/DL (ref 0.5–1.4)
CREAT SERPL-MCNC: 0.47 MG/DL (ref 0.5–1.4)
CREAT SERPL-MCNC: 0.48 MG/DL (ref 0.5–1.4)
CREAT SERPL-MCNC: 0.54 MG/DL (ref 0.5–1.4)
CREAT SERPL-MCNC: 0.59 MG/DL (ref 0.5–1.4)
EKG IMPRESSION: NORMAL
EOSINOPHIL # BLD AUTO: 0.16 K/UL (ref 0–0.51)
EOSINOPHIL NFR BLD: 3 % (ref 0–6.9)
ERYTHROCYTE [DISTWIDTH] IN BLOOD BY AUTOMATED COUNT: 39.4 FL (ref 35.9–50)
GLOBULIN SER CALC-MCNC: 2.3 G/DL (ref 1.9–3.5)
GLUCOSE BLD-MCNC: 103 MG/DL (ref 65–99)
GLUCOSE BLD-MCNC: 107 MG/DL (ref 65–99)
GLUCOSE BLD-MCNC: 109 MG/DL (ref 65–99)
GLUCOSE BLD-MCNC: 113 MG/DL (ref 65–99)
GLUCOSE BLD-MCNC: 113 MG/DL (ref 65–99)
GLUCOSE BLD-MCNC: 114 MG/DL (ref 65–99)
GLUCOSE BLD-MCNC: 115 MG/DL (ref 65–99)
GLUCOSE BLD-MCNC: 127 MG/DL (ref 65–99)
GLUCOSE BLD-MCNC: 127 MG/DL (ref 65–99)
GLUCOSE BLD-MCNC: 128 MG/DL (ref 65–99)
GLUCOSE BLD-MCNC: 128 MG/DL (ref 65–99)
GLUCOSE BLD-MCNC: 129 MG/DL (ref 65–99)
GLUCOSE BLD-MCNC: 130 MG/DL (ref 65–99)
GLUCOSE BLD-MCNC: 133 MG/DL (ref 65–99)
GLUCOSE BLD-MCNC: 146 MG/DL (ref 65–99)
GLUCOSE BLD-MCNC: 81 MG/DL (ref 65–99)
GLUCOSE BLD-MCNC: 91 MG/DL (ref 65–99)
GLUCOSE BLD-MCNC: 95 MG/DL (ref 65–99)
GLUCOSE BLD-MCNC: 98 MG/DL (ref 65–99)
GLUCOSE SERPL-MCNC: 105 MG/DL (ref 65–99)
GLUCOSE SERPL-MCNC: 117 MG/DL (ref 65–99)
GLUCOSE SERPL-MCNC: 134 MG/DL (ref 65–99)
GLUCOSE SERPL-MCNC: 143 MG/DL (ref 65–99)
GLUCOSE SERPL-MCNC: 146 MG/DL (ref 65–99)
GLUCOSE SERPL-MCNC: 157 MG/DL (ref 65–99)
HCT VFR BLD AUTO: 33.7 % (ref 37–47)
HGB BLD-MCNC: 11.6 G/DL (ref 12–16)
IMM GRANULOCYTES # BLD AUTO: 0.02 K/UL (ref 0–0.11)
IMM GRANULOCYTES NFR BLD AUTO: 0.4 % (ref 0–0.9)
LYMPHOCYTES # BLD AUTO: 2.32 K/UL (ref 1–4.8)
LYMPHOCYTES NFR BLD: 44.1 % (ref 22–41)
MAGNESIUM SERPL-MCNC: 1.4 MG/DL (ref 1.5–2.5)
MCH RBC QN AUTO: 29 PG (ref 27–33)
MCHC RBC AUTO-ENTMCNC: 34.4 G/DL (ref 33.6–35)
MCV RBC AUTO: 84.3 FL (ref 81.4–97.8)
MONOCYTES # BLD AUTO: 0.46 K/UL (ref 0–0.85)
MONOCYTES NFR BLD AUTO: 8.7 % (ref 0–13.4)
NEUTROPHILS # BLD AUTO: 2.27 K/UL (ref 2–7.15)
NEUTROPHILS NFR BLD: 43.2 % (ref 44–72)
NRBC # BLD AUTO: 0 K/UL
NRBC BLD-RTO: 0 /100 WBC
PHOSPHATE SERPL-MCNC: 2.7 MG/DL (ref 2.5–4.5)
PLATELET # BLD AUTO: 199 K/UL (ref 164–446)
PMV BLD AUTO: 9.7 FL (ref 9–12.9)
POTASSIUM SERPL-SCNC: 3.3 MMOL/L (ref 3.6–5.5)
POTASSIUM SERPL-SCNC: 3.3 MMOL/L (ref 3.6–5.5)
POTASSIUM SERPL-SCNC: 3.4 MMOL/L (ref 3.6–5.5)
POTASSIUM SERPL-SCNC: 3.5 MMOL/L (ref 3.6–5.5)
POTASSIUM SERPL-SCNC: 3.6 MMOL/L (ref 3.6–5.5)
POTASSIUM SERPL-SCNC: 3.7 MMOL/L (ref 3.6–5.5)
PROT SERPL-MCNC: 5.6 G/DL (ref 6–8.2)
RBC # BLD AUTO: 4 M/UL (ref 4.2–5.4)
SODIUM SERPL-SCNC: 133 MMOL/L (ref 135–145)
SODIUM SERPL-SCNC: 134 MMOL/L (ref 135–145)
SODIUM SERPL-SCNC: 135 MMOL/L (ref 135–145)
SODIUM SERPL-SCNC: 136 MMOL/L (ref 135–145)
WBC # BLD AUTO: 5.3 K/UL (ref 4.8–10.8)

## 2018-06-24 PROCEDURE — 700111 HCHG RX REV CODE 636 W/ 250 OVERRIDE (IP): Performed by: INTERNAL MEDICINE

## 2018-06-24 PROCEDURE — 80048 BASIC METABOLIC PNL TOTAL CA: CPT | Mod: 91

## 2018-06-24 PROCEDURE — 85025 COMPLETE CBC W/AUTO DIFF WBC: CPT

## 2018-06-24 PROCEDURE — 700105 HCHG RX REV CODE 258: Performed by: INTERNAL MEDICINE

## 2018-06-24 PROCEDURE — 700111 HCHG RX REV CODE 636 W/ 250 OVERRIDE (IP): Performed by: HOSPITALIST

## 2018-06-24 PROCEDURE — 770022 HCHG ROOM/CARE - ICU (200)

## 2018-06-24 PROCEDURE — 82962 GLUCOSE BLOOD TEST: CPT | Mod: 91

## 2018-06-24 PROCEDURE — 84100 ASSAY OF PHOSPHORUS: CPT

## 2018-06-24 PROCEDURE — 99291 CRITICAL CARE FIRST HOUR: CPT | Performed by: INTERNAL MEDICINE

## 2018-06-24 PROCEDURE — 80053 COMPREHEN METABOLIC PANEL: CPT

## 2018-06-24 PROCEDURE — 99291 CRITICAL CARE FIRST HOUR: CPT | Performed by: HOSPITALIST

## 2018-06-24 PROCEDURE — 700102 HCHG RX REV CODE 250 W/ 637 OVERRIDE(OP): Performed by: INTERNAL MEDICINE

## 2018-06-24 PROCEDURE — 82010 KETONE BODYS QUAN: CPT

## 2018-06-24 PROCEDURE — 83735 ASSAY OF MAGNESIUM: CPT

## 2018-06-24 PROCEDURE — C9113 INJ PANTOPRAZOLE SODIUM, VIA: HCPCS | Performed by: HOSPITALIST

## 2018-06-24 RX ORDER — POTASSIUM CHLORIDE 29.8 MG/ML
40 INJECTION INTRAVENOUS ONCE
Status: COMPLETED | OUTPATIENT
Start: 2018-06-24 | End: 2018-06-24

## 2018-06-24 RX ORDER — POTASSIUM CHLORIDE 14.9 MG/ML
20 INJECTION INTRAVENOUS ONCE
Status: COMPLETED | OUTPATIENT
Start: 2018-06-24 | End: 2018-06-24

## 2018-06-24 RX ORDER — MAGNESIUM SULFATE HEPTAHYDRATE 40 MG/ML
2 INJECTION, SOLUTION INTRAVENOUS ONCE
Status: COMPLETED | OUTPATIENT
Start: 2018-06-24 | End: 2018-06-24

## 2018-06-24 RX ADMIN — PROCHLORPERAZINE EDISYLATE 5 MG: 5 INJECTION INTRAMUSCULAR; INTRAVENOUS at 06:30

## 2018-06-24 RX ADMIN — DIPHENHYDRAMINE HYDROCHLORIDE 50 MG: 50 INJECTION, SOLUTION INTRAMUSCULAR; INTRAVENOUS at 06:14

## 2018-06-24 RX ADMIN — DEXTROSE AND SODIUM CHLORIDE: 10; .45 INJECTION, SOLUTION INTRAVENOUS at 08:50

## 2018-06-24 RX ADMIN — DIPHENHYDRAMINE HYDROCHLORIDE 50 MG: 50 INJECTION, SOLUTION INTRAMUSCULAR; INTRAVENOUS at 00:06

## 2018-06-24 RX ADMIN — DEXTROSE AND SODIUM CHLORIDE: 10; .45 INJECTION, SOLUTION INTRAVENOUS at 17:10

## 2018-06-24 RX ADMIN — DIPHENHYDRAMINE HYDROCHLORIDE 50 MG: 50 INJECTION, SOLUTION INTRAMUSCULAR; INTRAVENOUS at 17:37

## 2018-06-24 RX ADMIN — METOCLOPRAMIDE 10 MG: 5 INJECTION, SOLUTION INTRAMUSCULAR; INTRAVENOUS at 17:37

## 2018-06-24 RX ADMIN — PROCHLORPERAZINE EDISYLATE 5 MG: 5 INJECTION INTRAMUSCULAR; INTRAVENOUS at 15:03

## 2018-06-24 RX ADMIN — LORAZEPAM 1 MG: 2 INJECTION INTRAMUSCULAR; INTRAVENOUS at 11:52

## 2018-06-24 RX ADMIN — SODIUM CHLORIDE 4 UNITS/HR: 9 INJECTION, SOLUTION INTRAVENOUS at 00:12

## 2018-06-24 RX ADMIN — MORPHINE SULFATE 3 MG: 4 INJECTION INTRAVENOUS at 11:15

## 2018-06-24 RX ADMIN — LORAZEPAM 1 MG: 2 INJECTION INTRAMUSCULAR; INTRAVENOUS at 17:38

## 2018-06-24 RX ADMIN — POTASSIUM CHLORIDE 40 MEQ: 400 INJECTION, SOLUTION INTRAVENOUS at 09:52

## 2018-06-24 RX ADMIN — METOCLOPRAMIDE 10 MG: 5 INJECTION, SOLUTION INTRAMUSCULAR; INTRAVENOUS at 20:39

## 2018-06-24 RX ADMIN — METOCLOPRAMIDE 10 MG: 5 INJECTION, SOLUTION INTRAMUSCULAR; INTRAVENOUS at 11:52

## 2018-06-24 RX ADMIN — PANTOPRAZOLE SODIUM 40 MG: 40 INJECTION, POWDER, LYOPHILIZED, FOR SOLUTION INTRAVENOUS at 08:00

## 2018-06-24 RX ADMIN — POTASSIUM CHLORIDE 40 MEQ: 400 INJECTION, SOLUTION INTRAVENOUS at 04:02

## 2018-06-24 RX ADMIN — DIPHENHYDRAMINE HYDROCHLORIDE 50 MG: 50 INJECTION, SOLUTION INTRAMUSCULAR; INTRAVENOUS at 11:52

## 2018-06-24 RX ADMIN — POTASSIUM CHLORIDE 20 MEQ: 200 INJECTION, SOLUTION INTRAVENOUS at 18:24

## 2018-06-24 RX ADMIN — MORPHINE SULFATE 3 MG: 4 INJECTION INTRAVENOUS at 02:15

## 2018-06-24 RX ADMIN — LORAZEPAM 1 MG: 2 INJECTION INTRAMUSCULAR; INTRAVENOUS at 00:06

## 2018-06-24 RX ADMIN — PANTOPRAZOLE SODIUM 40 MG: 40 INJECTION, POWDER, LYOPHILIZED, FOR SOLUTION INTRAVENOUS at 20:39

## 2018-06-24 RX ADMIN — POTASSIUM CHLORIDE 20 MEQ: 200 INJECTION, SOLUTION INTRAVENOUS at 15:04

## 2018-06-24 RX ADMIN — LORAZEPAM 1 MG: 2 INJECTION INTRAMUSCULAR; INTRAVENOUS at 06:14

## 2018-06-24 RX ADMIN — MAGNESIUM SULFATE IN WATER 2 G: 40 INJECTION, SOLUTION INTRAVENOUS at 09:10

## 2018-06-24 RX ADMIN — ENOXAPARIN SODIUM 40 MG: 100 INJECTION SUBCUTANEOUS at 20:39

## 2018-06-24 RX ADMIN — DEXTROSE AND SODIUM CHLORIDE: 10; .45 INJECTION, SOLUTION INTRAVENOUS at 01:12

## 2018-06-24 RX ADMIN — METOCLOPRAMIDE 10 MG: 5 INJECTION, SOLUTION INTRAMUSCULAR; INTRAVENOUS at 06:14

## 2018-06-24 RX ADMIN — MAGNESIUM SULFATE IN WATER 2 G: 40 INJECTION, SOLUTION INTRAVENOUS at 04:02

## 2018-06-24 RX ADMIN — PROCHLORPERAZINE EDISYLATE 5 MG: 5 INJECTION INTRAMUSCULAR; INTRAVENOUS at 22:14

## 2018-06-24 RX ADMIN — KETOROLAC TROMETHAMINE 30 MG: 30 INJECTION, SOLUTION INTRAMUSCULAR at 20:39

## 2018-06-24 ASSESSMENT — ENCOUNTER SYMPTOMS
RESPIRATORY NEGATIVE: 1
DIARRHEA: 0
ABDOMINAL PAIN: 1
MUSCULOSKELETAL NEGATIVE: 1
FEVER: 0
BLOOD IN STOOL: 0
LOSS OF CONSCIOUSNESS: 0
CONSTITUTIONAL NEGATIVE: 1
DIZZINESS: 0
NAUSEA: 1
NEUROLOGICAL NEGATIVE: 1
COUGH: 0
CONSTIPATION: 0
VOMITING: 1
HEADACHES: 0
EYES NEGATIVE: 1
HEARTBURN: 0
CARDIOVASCULAR NEGATIVE: 1
PSYCHIATRIC NEGATIVE: 1
CHILLS: 0
SHORTNESS OF BREATH: 0
BACK PAIN: 0

## 2018-06-24 ASSESSMENT — PAIN SCALES - GENERAL
PAINLEVEL_OUTOF10: 0
PAINLEVEL_OUTOF10: 3
PAINLEVEL_OUTOF10: 9
PAINLEVEL_OUTOF10: 0
PAINLEVEL_OUTOF10: 9
PAINLEVEL_OUTOF10: 3
PAINLEVEL_OUTOF10: 6
PAINLEVEL_OUTOF10: 0
PAINLEVEL_OUTOF10: 2
PAINLEVEL_OUTOF10: 0

## 2018-06-24 NOTE — PROGRESS NOTES
[facility]ist Progress Note    Date of Service: 2018    Chief Complaint  27 y.o. female admitted 2018 with PMHx of TIDM poorly controled and diabetic gastroparesis.  Had an EGD in  showing erosive esophagitis and gastritis with pyloric stenosis which was dialated.  Sent home on PPI and carafate.  Admitted on this occasion with DKA.      Interval Problem Update    On D10 half normal  Inuslin 2u/hr  V x 2 overnight  C/o pain in upper sigifredo, feels sleepy  Sinus tach 120s  -130s  UOP 1.5 litres/12hrs      Consultants/Specialty  GI  Pulmonology    Disposition  Cont in ICU on DKA protocol    [facility] GI and Pulmonology    Critical care time 34min's: DKA, coordinating care        Review of Systems   Constitutional: Positive for malaise/fatigue. Negative for chills and fever.   Respiratory: Negative for cough and shortness of breath.    Cardiovascular: Negative for chest pain.   Gastrointestinal: Positive for abdominal pain, nausea and vomiting. Negative for diarrhea.   Musculoskeletal: Negative for back pain.   Skin: Negative for rash.   Neurological: Negative for dizziness, loss of consciousness and headaches.      Physical Exam  Laboratory/Imaging   Hemodynamics  Temp (24hrs), Av.1 °C (98.7 °F), Min:36.9 °C (98.4 °F), Max:37.1 °C (98.8 °F)   Temperature: 37.1 °C (98.8 °F)  Pulse  Av.3  Min: 60  Max: 166 Heart Rate (Monitored): (!) 112  Blood Pressure: 154/88, NIBP: 117/73      Respiratory      Respiration: 18, Pulse Oximetry: 98 %        RUL Breath Sounds: Clear, RML Breath Sounds: Clear, RLL Breath Sounds: Diminished, NANCY Breath Sounds: Clear, LLL Breath Sounds: Diminished    Fluids    Intake/Output Summary (Last 24 hours) at 18 0534  Last data filed at 18 0402   Gross per 24 hour   Intake          4157.07 ml   Output             3500 ml   Net           657.07 ml       Nutrition  Orders Placed This Encounter   Procedures   • Diet NPO     Standing Status:   Standing     Number of  Occurrences:   1     Order Specific Question:   Restrict to:     Answer:   Sips with Medications [3]     Physical Exam   Constitutional: She is oriented to person, place, and time. She appears well-developed and well-nourished. No distress.   HENT:   Head: Normocephalic and atraumatic.   Neck: No JVD present.   Cardiovascular: Normal rate and regular rhythm.    Pulmonary/Chest: Effort normal. No stridor. No respiratory distress. She has no wheezes. She has no rales.   Abdominal: Soft. She exhibits no distension. There is tenderness. There is no rebound and no guarding.   Musculoskeletal: She exhibits no edema.   Neurological: She is oriented to person, place, and time.   Skin: Skin is warm and dry. No rash noted. She is not diaphoretic.   Psychiatric: She has a normal mood and affect. Thought content normal.   Nursing note and vitals reviewed.      Recent Labs      06/22/18   0324  06/23/18   0406   WBC  5.0  8.3   RBC  4.19*  4.72   HEMOGLOBIN  12.2  13.8   HEMATOCRIT  35.4*  40.7   MCV  84.5  86.2   MCH  29.1  29.2   MCHC  34.5  33.9   RDW  37.6  39.0   PLATELETCT  204  233   MPV  10.3  10.1     Recent Labs      06/23/18   1821  06/23/18   2215  06/24/18   0215   SODIUM  134*  135  135   POTASSIUM  3.5*  3.6  3.4*   CHLORIDE  114*  113*  113*   CO2  10*  12*  13*   GLUCOSE  133*  90  117*   BUN  3*  4*  4*   CREATININE  0.58  0.51  0.45*   CALCIUM  8.7  8.4*  8.4*             Recent Labs      06/23/18   1821   TRIGLYCERIDE  89          Assessment/Plan     * Diabetic ketoacidosis (HCC)- (present on admission)   Assessment & Plan    Present upon admit and was treated with transfer out of the ICU.  On 6/23 she went back into DKA with an anion gap of 18 and bicarb of 9.  She has been restarted on IV fluids and an insulin drip  Latest Hgb A1C is 8.8.  Due to poor IV access, she may be a candidate for a port.         Intractable nausea and vomiting- (present on admission)   Assessment & Plan      May be due to cyclic  vomiting syndrome vs gastroparesis  Ativan/benadryl  Prn anti-emetics  Continue IV Protonix          Gastroparesis due to DM (HCC)- (present on admission)   Assessment & Plan    On ativan and benadryl  Cyclic Vomiting vs gastroparesis  Previous emptying study normal  Cont current regimen next 24hrs  Holding TPN as still in DKA  If fails all of this will need J tube and consideration of transfer for second oppinion        Esophagitis- (present on admission)   Assessment & Plan    And gastritis as well as pyloric stenosis seen on EGD done on 6/9/18.  Continue Carafate and IV Protonix        Type 1 diabetes mellitus (HCC)- (present on admission)   Assessment & Plan    Previously HbA1c 8.8.          Hypomagnesemia   Assessment & Plan    Magnesium has been replentished.         Hypokalemia- (present on admission)   Assessment & Plan    -- change iv fluid to normal saline with KCl 20 meq as the patient has hyponatremia now.  -- monitor level.        Pyloric stenosis   Assessment & Plan    Status post dilatation on 6/9.   GI following           Quality-Core Measures   Reviewed items::  EKG reviewed, Labs reviewed, Medications reviewed and Radiology images reviewed  Pitt catheter::  No Pitt  DVT prophylaxis pharmacological::  Enoxaparin (Lovenox)  DVT prophylaxis - mechanical:  SCDs  Ulcer Prophylaxis::  Yes

## 2018-06-24 NOTE — PROCEDURES
Procedure: central line with conscious sedation    Indication: Diabetic ketoacidosis    Consent: by patient    2 mg IV versed was given for conscious sedation    Description: a time-out was called. The right neck was prepped and draped in a sterile fashion.  Using sterile ultrasound guidance, the right internal jugular vein was identified. 2 mL of 1% lidocaine was used for anesthesia.  A large bore needle was used to aspirate dark, red, non-pulsating blood. Seldinger technique was used to place the central venous catheter. All three ports were aspirated and flushed with sterile saline.  The central line was adhered to the skin with sutures.     Blood loss: 1  mL    A stat chest x-ray is pending at this time.

## 2018-06-24 NOTE — CARE PLAN
Problem: Fluid Volume:  Goal: Will maintain adequate fluid volume    Intervention: Record intake and output  Patient on strict I&Os, NPO at this time      Problem: Metabolic:  Goal: Ability to maintain appropriate glucose levels will improve    Intervention: Manage blood glucose measurement  Patient on q1 hour fsbs, titrating insuin to policy

## 2018-06-24 NOTE — PROGRESS NOTES
Pulmonary Critical Care Progress Note        DOA: 6/13/2018    Chief Complaint: DKA    History of Present Illness: 27 y.o. female with a known   recurrent history of uncontrolled diabetes mellitus.  The patient was just   here on Monday and discharged recently.  She was noted to have a blood sugar   of over 400 and had been unable to keep any food down or fluids.  She states   that when she was here and discharged on Monday, at that time, she had a   central line and has had a scar with abrasion to her neck region from the line   removal.  No evidence that she had shingles, though the wound appears to be   somewhat vesicular in nature.  Nevertheless, the patient stated that she was   unable to take any oral medications and recently had an EGD done on   06/09/2018, which showed erosive esophagitis and gastritis as well as pyloric   stenosis.  She had a dilatation done as well.     The patient was discharged on PPI and Carafate, but she was unable to take.    She states that she is being evaluated with a new endocrinologist next week in   regards to possible insulin pump candidate.  She states that when she   arrived, she was having dysuria, polyuria, and still nausea, and vomiting.    She was placed on a diabetic ketoacidosis protocol with an insulin infusion.    She had Accu-Chek, it was over 500 with a high anion gap acidosis with ketones   bodies.  UA was negative for urine infection.    ROS:  Respiratory: negative cough and negative sputum production, Cardiac: negative orthopnea and negative leg swelling, GI: positive nausea, positive vomiting, positive abdominal pain and positive constipation.  All other systems reviewed with patient and are negative.    Interval Events:  24 hour interval history reviewed    - D10 gtt for hypoglycemia, insulin gtt @ 2   - more lethargic on benadryl/ativan --> vomited only twice   - sinus tach remains but slightly lower   - AF   - NPO, unable to get coretrak/NGT   - drop in Hgb  without bleeds   - voiding   - low K/Mag   - AG 7 but bicarb remains low at 14     PFSH:  No change.    Respiratory:   Room air  Pulse Oximetry: 98 %          Exam: unlabored respirations, no intercostal retractions or accessory muscle use and rhonchi bibasilar  ImagingCXR  I have personally reviewed the chest x-ray my impression is   (compared to prior film) no acute cardio pulmonary process, right IJ central venous catheter in good position without pneumothorax        Invalid input(s): [id number]    HemoDynamics:  Pulse: (!) 113, Heart Rate (Monitored): (!) 112  Blood Pressure: 154/88, NIBP: 106/69       Exam: no murmur, regular rhythm (Sinus), sinus tachycardia, trace LE edema  Imaging: Available data reviewed        Neuro:  GCS  15       Exam: no focal deficits noted mental status intact oriented for age x3, better mood today  Imaging: Available data reviewed    Fluids:  Intake/Output       06/22/18 0700 - 06/23/18 0659 06/23/18 0700 - 06/24/18 0659 06/24/18 0700 - 06/25/18 0659      1279-9423 2341-5136 Total 1987-9319 6240-5154 Total 2852-0843 9929-8580 Total       Intake    P.O.  0  120 120  --  -- --  --  -- --    P.O. 0 120 120 -- -- -- -- -- --    I.V.  --  -- --  2000  2423.1 4423.1  --  -- --    Magnesium Sulfate Volume -- -- -- -- 250 250 -- -- --    Insulin Volume -- -- -- -- 283.1 283.1 -- -- --    D10%0.45%NS -- -- -- -- 1290 1290 -- -- --    IV Volume (NSS) -- -- -- 2000 -- 2000 -- -- --    IV Volume (Potassium) -- -- -- -- 600 600 -- -- --    Total Intake 0  2423.1 4423.1 -- -- --       Output    Urine  --  -- --  2700  0 2700  --  -- --    Number of Times Voided 1 x 1 x 2 x -- 0 x 0 x -- -- --    Urine Void (mL) (non-catheter) -- -- -- 2700 0 2700 -- -- --    Emesis  200  950 1150  650  100 750  --  -- --    Emesis  650 100 750 -- -- --    Emesis - Number of Times 1 x -- 1 x -- -- -- -- -- --    Total Output  3350 100 3450 -- -- --       Net I/O     -200 830  -1030 -1350 2323.1 973.1 -- -- --        Weight: 78.9 kg (173 lb 15.1 oz)  Recent Labs      18   0727   18   18218   0215  18   0605   SODIUM  131*   < >  134*  135  135  133*   POTASSIUM  4.5   < >  3.5*  3.6  3.4*  3.3*   CHLORIDE  108   < >  114*  113*  113*  112   CO2  5*   < >  10*  12*  13*  14*   BUN  5*   < >  3*  4*  4*  4*   CREATININE  0.53   < >  0.58  0.51  0.45*  0.42*   MAGNESIUM  1.6   --   1.6   --   1.4*   --    PHOSPHORUS  2.6   --   1.5*   --   2.7   --    CALCIUM  8.6   < >  8.7  8.4*  8.4*  8.3*    < > = values in this interval not displayed.       GI/Nutrition:  Exam: Hypoactive bowel sounds, mild epigastric tenderness, no rebound/guarding  Imaging: Available data reviewed - last KUB 6/15, upper GI series   NPO  Liver Function  Recent Labs      18   1138   18   18218   02118   0605   ALTSGPT   --    --   13   --   11   --    ASTSGOT   --    --   11*   --   10*   --    ALKPHOSPHAT   --    --   97   --   85   --    TBILIRUBIN   --    --   0.5   --   0.6   --    LIPASE  42   --    --    --    --    --    GLUCOSE   --    < >  133*  90  117*  143*    < > = values in this interval not displayed.       Heme:  Recent Labs      18   0324  18   0406  18   0605   RBC  4.19*  4.72  4.00*   HEMOGLOBIN  12.2  13.8  11.6*   HEMATOCRIT  35.4*  40.7  33.7*   PLATELETCT  204  233  199       Infectious Disease:  Temp  Av.1 °C (98.7 °F)  Min: 36.9 °C (98.4 °F)  Max: 37.1 °C (98.8 °F)  Micro: reviewed None this admission  Recent Labs      18   0324  18   0406  18   1821  18   0215  18   0605   WBC  5.0  8.3   --    --   5.3   NEUTSPOLYS  61.20  77.60*   --    --   43.20*   LYMPHOCYTES  28.30  14.60*   --    --   44.10*   MONOCYTES  8.50  6.20   --    --   8.70   EOSINOPHILS  1.20  0.40   --    --   3.00   BASOPHILS  0.40  0.60   --    --   0.60   ASTSGOT   --    --    11*  10*   --    ALTSGPT   --    --   13  11   --    ALKPHOSPHAT   --    --   97  85   --    TBILIRUBIN   --    --   0.5  0.6   --      Current Facility-Administered Medications   Medication Dose Frequency Provider Last Rate Last Dose   • potassium chloride in water (KCL) ivpb **Administer in ICU only** 40 mEq  40 mEq Once Lior Acuña M.D. 25 mL/hr at 06/24/18 0402 40 mEq at 06/24/18 0402   • dextrose 10% and 0.45% NaCl infusion   Continuous Cholo Alan Jr., D.O. 125 mL/hr at 06/24/18 0112     • MD ALERT-PHARMACY TO CONSULT FOR DKA MONITORING 1 Each  1 Each PRN Susie Young M.D.       • Adult DKA potassium(K+) replacement scale  1 Each Q4HRS Susie Young M.D.   1 Each at 06/24/18 0200   • NS infusion   Continuous Susie Young M.D.   Stopped at 06/23/18 1052   • D5 NS infusion   Continuous Susie Young M.D.   Stopped at 06/23/18 1434   • insulin regular human (HUMULIN/NOVOLIN R) 62.5 Units in  mL Infusion for DKA  4 Units/hr Continuous Cholo Alan Jr. D.O. 8 mL/hr at 06/24/18 0612 2 Units/hr at 06/24/18 0612   • TPN per pharmacy protocol   PRN Lior Acuña M.D.       • LORazepam (ATIVAN) injection 1 mg  1 mg 4XDAY Lior Child M.D.   1 mg at 06/24/18 0614   • diphenhydrAMINE (BENADRYL) injection 50 mg  50 mg Q6HRS Lior Child M.D.   50 mg at 06/24/18 0614   • OLANZapine (ZYPREXA) tablet 5 mg  5 mg Q EVENING Arnulfo Gavin D.O.   Stopped at 06/22/18 2159   • morphine (pf) 4 mg/ml injection 3 mg  3 mg Q4HRS PRN Arnulfo Gavin D.O.   3 mg at 06/24/18 0215   • ketorolac (TORADOL) injection 30 mg  30 mg Q6HRS PRN Arnulfo Gavin D.O.   30 mg at 06/22/18 0459   • metoclopramide (REGLAN) injection 10 mg  10 mg 4X/DAY ACHS Arnulfo Gavin D.O.   10 mg at 06/24/18 0614   • prochlorperazine (COMPAZINE) injection 5 mg  5 mg Q8HRS Pa Urbina M.D.   5 mg at 06/24/18 0630   • pantoprazole (PROTONIX) injection 40 mg  40 mg BID Pa Urbina  M.D.   40 mg at 06/23/18 2124   • glucose 4 g chewable tablet 16 g  16 g Q15 MIN PRN Pa Urbina M.D.        And   • dextrose 50% (D50W) injection 25 mL  25 mL Q15 MIN PRN Pa Urbina M.D.   25 mL at 06/19/18 1646   • enoxaparin (LOVENOX) inj 40 mg  40 mg QHS Pa Urbina M.D.   40 mg at 06/23/18 2122   • senna-docusate (PERICOLACE or SENOKOT S) 8.6-50 MG per tablet 2 Tab  2 Tab BID Shara Hurd M.D.   Stopped at 06/13/18 0900    And   • polyethylene glycol/lytes (MIRALAX) PACKET 1 Packet  1 Packet QDAY PRRONALD Hurd M.D.        And   • magnesium hydroxide (MILK OF MAGNESIA) suspension 30 mL  30 mL QDAY PRRONALD Hurd M.D.        And   • bisacodyl (DULCOLAX) suppository 10 mg  10 mg QDAY PRN Shara Hurd M.D.       • labetalol (NORMODYNE,TRANDATE) injection 10 mg  10 mg Q4HRS PRRONALD Hurd M.D.       • ondansetron (ZOFRAN) syringe/vial injection 4 mg  4 mg Q4HRS PRRONALD Hurd M.D.   4 mg at 06/23/18 1554   • ondansetron (ZOFRAN ODT) dispertab 4 mg  4 mg Q4HRS PRN Shara Hurd M.D.       • prochlorperazine (COMPAZINE) injection 5-10 mg  5-10 mg Q4HRS MARK Hurd M.D.   10 mg at 06/22/18 0101   • acetaminophen (TYLENOL) tablet 650 mg  650 mg Q6HRS PRRONALD Hurd M.D.       • atorvastatin (LIPITOR) tablet 20 mg  20 mg DAILY Shara Hurd M.D.   20 mg at 06/22/18 0852   • cholecalciferol (VITAMIN D3) tablet 400 Units  400 Units DAILY Shara Hurd M.D.   400 Units at 06/22/18 0852   • gabapentin (NEURONTIN) capsule 100 mg  100 mg BID Shara Hurd M.D.   Stopped at 06/22/18 2939   • sucralfate (CARAFATE) 1 GM/10ML suspension 1 g  1 g Q6HRS Shara Hurd M.D.   1 g at 06/21/18 1723     Last reviewed on 6/13/2018  8:14 AM by Blade Darden    Quality  Measures:  Medications reviewed, Labs reviewed, Radiology images reviewed and EKG reviewed  Pitt catheter: Critically Ill - Requiring Accurate  Measurement of Urinary Output      DVT Prophylaxis: Enoxaparin (Lovenox)  DVT prophylaxis - mechanical: SCDs  Ulcer prophylaxis: Yes    Assessed for rehab: Patient returned to prior level of function, rehabilitation not indicated at this time      Assessment/Plan:  Recurrent acute diabetic ketoacidosis with h/o poorly controlled DMI - remains uncontrolled   - Continue DKA protocol with insulin drip and ongoing aggressive fluid resuscitation   - Close electrolyte monitoring and repletion   - N.p.o., hold off on TPN for today again  Severe gastroparesis s/p EGD and pyloric dilation for possible gastric outlet obstruction vs cyclical vomiting   - cont pro-motility agents, ?gastric stimulator candidate   - KUB prn   - cont ativan and benadryl as has had some effect thus far, monitor sedation levels   - consider J-tube if persists for nutrition once DKA resolves  Poor IV access s/p CVL placement  Severe protein calorie following malnutrition - NPO currently but eventual SB TFs and monitor closely for re-feeding syndrome  Constipation - aggressive bowel protocol  History of previous iatrogenic pneumothorax from CVL placement  Acute blood loss anemia - monitor without transfusion today  HypoK/Mag - repletion and monitor daily  Prophylaxis, NPO    Patient is critically ill today requiring my active management of her insulin drip as well as gastrointestinal management.    Discussed patient condition and risk of morbidity and/or mortality with RN, RT, Pharmacy, Charge nurse / hot rounds, QA team, Patient and GI and hospitalist.    The patient remains critically ill.  Critical care time = 32 minutes in directly providing and coordinating critical care and extensive data review.  No time overlap and excludes procedures.

## 2018-06-24 NOTE — PROGRESS NOTES
Time out was called prior to central line insertion.   Versed administered 1748- see MAR  Start time 1750  End time 1756    Chest X ray being done at this time.

## 2018-06-24 NOTE — PROGRESS NOTES
Gastroenterology Progress Note     Author: Lior Child   Date & Time Created: 6/24/2018 11:44 AM    Chief Complaint:  Nausea/vomiting/abdominal pain    Interval History:  6/22/2018: Only 1 episode of vomiting in the last 24 hours.  Still have ongoing epigastric pain. Epigastric without trigger, recurrent not associated with food. No BM since admission. Passing flatus.  6/23/2018: Transferred to ICU due to low bicarbonate to start DKA protocol.  6/24/2018: More calm, no abdominal pain today. Patient nurse states only 2 episodes of emesis in the last 24 hours. Tachycardia improving.    Review of Systems:  Review of Systems   Constitutional: Negative.    HENT: Negative.    Eyes: Negative.    Respiratory: Negative.    Cardiovascular: Negative.    Gastrointestinal: Positive for abdominal pain, nausea and vomiting. Negative for blood in stool, constipation, diarrhea, heartburn and melena.   Genitourinary: Negative.    Musculoskeletal: Negative.    Skin: Negative.    Neurological: Negative.    Endo/Heme/Allergies: Negative.    Psychiatric/Behavioral: Negative.        Physical Exam:  Physical Exam   Constitutional: She is oriented to person, place, and time. She appears well-developed and well-nourished.   HENT:   Head: Normocephalic and atraumatic.   Eyes: Conjunctivae are normal. Pupils are equal, round, and reactive to light.   Neck: Normal range of motion. Neck supple. No JVD present. No tracheal deviation present. No thyromegaly present.   Cardiovascular: Normal rate and regular rhythm.    Pulmonary/Chest: Effort normal and breath sounds normal. No stridor.   Abdominal: Soft. Bowel sounds are normal. She exhibits no distension and no mass. There is no tenderness. There is no rebound and no guarding.   Musculoskeletal: Normal range of motion.   Lymphadenopathy:     She has no cervical adenopathy.   Neurological: She is alert and oriented to person, place, and time.   Skin: Skin is warm and dry.       Labs:         Invalid input(s): [id number]      Recent Labs      18   0727   18   0618   0855   SODIUM  131*   < >  134*   < >  135  133*  134*   POTASSIUM  4.5   < >  3.5*   < >  3.4*  3.3*  3.3*   CHLORIDE  108   < >  114*   < >  113*  112  113*   CO2  5*   < >  10*   < >  13*  14*  14*   BUN  5*   < >  3*   < >  4*  4*  4*   CREATININE  0.53   < >  0.58   < >  0.45*  0.42*  0.47*   MAGNESIUM  1.6   --   1.6   --   1.4*   --    --    PHOSPHORUS  2.6   --   1.5*   --   2.7   --    --    CALCIUM  8.6   < >  8.7   < >  8.4*  8.3*  8.2*    < > = values in this interval not displayed.     Recent Labs      18   0855   ALTSGPT  13   --   11   --    --    ASTSGOT  11*   --   10*   --    --    ALKPHOSPHAT  97   --   85   --    --    TBILIRUBIN  0.5   --   0.6   --    --    GLUCOSE  133*   < >  117*  143*  157*    < > = values in this interval not displayed.     Recent Labs      18   06   RBC  4.19*  4.72  4.00*   HEMOGLOBIN  12.2  13.8  11.6*   HEMATOCRIT  35.4*  40.7  33.7*   PLATELETCT  204  233  199     Recent Labs      18   0605   WBC  5.0  8.3   --    --   5.3   NEUTSPOLYS  61.20  77.60*   --    --   43.20*   LYMPHOCYTES  28.30  14.60*   --    --   44.10*   MONOCYTES  8.50  6.20   --    --   8.70   EOSINOPHILS  1.20  0.40   --    --   3.00   BASOPHILS  0.40  0.60   --    --   0.60   ASTSGOT   --    --   11*  10*   --    ALTSGPT   --    --   13  11   --    ALKPHOSPHAT   --    --   97  85   --    TBILIRUBIN   --    --   0.5  0.6   --      Hemodynamics:  Temp (24hrs), Av °C (98.6 °F), Min:36.6 °C (97.8 °F), Max:37.1 °C (98.8 °F)  Temperature: 36.6 °C (97.8 °F)  Pulse  Av.6  Min: 60  Max: 166Heart Rate (Monitored): (!) 125  Blood Pressure: 113/76, NIBP: 113/76     Respiratory:     Respiration: 15, Pulse Oximetry: 100 %        RUL Breath Sounds: Clear, RML Breath Sounds: Clear, RLL Breath Sounds: Diminished, NANCY Breath Sounds: Clear, LLL Breath Sounds: Diminished  Fluids:    Intake/Output Summary (Last 24 hours) at 06/22/18 1556  Last data filed at 06/22/18 1400   Gross per 24 hour   Intake                0 ml   Output              750 ml   Net             -750 ml     Weight: 78.9 kg (173 lb 15.1 oz)  GI/Nutrition:  Orders Placed This Encounter   Procedures   • Diet NPO     Standing Status:   Standing     Number of Occurrences:   1     Order Specific Question:   Restrict to:     Answer:   Sips with Medications [3]     Medical Decision Making, by Problem:  Active Hospital Problems    Diagnosis   • *Diabetic ketoacidosis (HCC) [E13.10]   • Intractable nausea and vomiting [R11.2]   • Gastroparesis due to DM (HCC) [E11.43, K31.84]   • Esophagitis [K20.9]   • Type 1 diabetes mellitus (HCC) [E10.9]   • Pyloric stenosis [K31.1]   • Anemia [D64.9]   • Hypomagnesemia [E83.42]   • Hypokalemia [E87.6]     EGD 6/9/2018  IMPRESSIONS AND FINDINGS:  1.  [place] classification grade C ulcerative esophagitis -- likely the   result the patient's nausea and vomiting.  2.  Gastritis, suspected.  3.  Pyloric stenosis, possible, status post dilation.           Imaging:  UGI 6/19/2018  Examination is significantly limited as the patient vomited the majority of ingested contrast and refused to take more contrast by mouth.  No evidence of gastric outlet obstruction.  There may be mild gastric fold thickening which can be seen in the setting of gastritis.  No evidence of malrotation.     KUB 6/15/2018  No evidence for bowel obstruction.        HIDA 6/10/2018  1. Normal hepatobiliary scan. No evidence of acute cholecystitis.  2. Normal gallbladder ejection fraction.     US 6/9/2018  Heterogeneously echogenic lesion within the right lobe of the liver measuring 3 x 2.8 x 2.2 cm may represent a hemangioma and was seen  "on prior ultrasound. Confirmation can be obtained with hepatic protocol MRI.  Hepatomegaly.  No evidence of gallstones or biliary ductal dilatation.     CT 6/3/2018  1.  Thickening of the distal esophageal wall, new since prior study, consider component of esophagitis. Could be followed up with endoscopy for further evaluation as clinically appropriate.  2.  Hepatomegaly      Impressions:  1. Intractable nausea/vomiting  2. Insulin dependent Type 1 Diabetes  3. Diabetic ketoacidosis - currently being managed.  4. Abdominal pain  5. Possible gastroparesis (however 2014 normal emptying study)  6. Metabolic acidosis     27-year-old female with history of type I diabetes, diabetic ketoacidosis who is being seen today for intractable nausea vomiting. Questionable history of gastroparesis. Symptom persists despite being on erythromycin 500mg, Neurontin 100 mg, ketorolac, Reglan, Zofran, Compazine.  While gastroparesis is possible (2014 gastric emptying normal). However, not ideal for gastric emptying currently due to all the promotility drugs onboard. other consideration would be cyclic vomiting syndrome due to persistent nausea with intermittent resolution of symptoms. Current treatment aimed as possible CVS exacerbation.     Recommendations:  1. Continue Benadryl 50mg scheduled and Ativan scheduled for today, wean tomorrow..   2. Continue Reglan, compazine. Continue ketorolac  3. Allow patient to rest today to \"sleep it off\".  4. Start amitriptyline 25mg QHS, Coezyme Q10 with 200mg BID, L-carnitine 1g BID once oral intake improves.   5. Potential transfer to tertiary hospital for management if no improvement.     Dr. John to assume care tomorrow.     Quality-Core Measures  "

## 2018-06-24 NOTE — CARE PLAN
Problem: Bowel/Gastric:  Goal: Normal bowel function is maintained or improved  Outcome: NOT MET  GI consulted. Pt has had no vomiting or nausea yet today which is an improvement. TPN orders were discontinued. Will educate pt about diet, fluids, meds, and activity to promote bowel function.    Problem: Skin Integrity  Goal: Risk for impaired skin integrity will decrease  Outcome: PROGRESSING AS EXPECTED  No skin issues noted or developing. Will assess risk factors for impaired skin integrity and pressure ulcers. Will implement precautions to protect skin integrity.

## 2018-06-25 ENCOUNTER — APPOINTMENT (OUTPATIENT)
Dept: RADIOLOGY | Facility: MEDICAL CENTER | Age: 28
DRG: 637 | End: 2018-06-25
Attending: HOSPITALIST
Payer: MEDICAID

## 2018-06-25 ENCOUNTER — APPOINTMENT (OUTPATIENT)
Dept: RADIOLOGY | Facility: MEDICAL CENTER | Age: 28
DRG: 637 | End: 2018-06-25
Attending: INTERNAL MEDICINE
Payer: MEDICAID

## 2018-06-25 LAB
ANION GAP SERPL CALC-SCNC: 7 MMOL/L (ref 0–11.9)
B-OH-BUTYR SERPL-MCNC: 0.21 MMOL/L (ref 0.02–0.27)
BUN SERPL-MCNC: 3 MG/DL (ref 8–22)
CALCIUM SERPL-MCNC: 8.6 MG/DL (ref 8.5–10.5)
CHLORIDE SERPL-SCNC: 115 MMOL/L (ref 96–112)
CO2 SERPL-SCNC: 15 MMOL/L (ref 20–33)
CREAT SERPL-MCNC: 0.51 MG/DL (ref 0.5–1.4)
GLUCOSE BLD-MCNC: 112 MG/DL (ref 65–99)
GLUCOSE BLD-MCNC: 113 MG/DL (ref 65–99)
GLUCOSE BLD-MCNC: 138 MG/DL (ref 65–99)
GLUCOSE BLD-MCNC: 170 MG/DL (ref 65–99)
GLUCOSE BLD-MCNC: 177 MG/DL (ref 65–99)
GLUCOSE BLD-MCNC: 180 MG/DL (ref 65–99)
GLUCOSE BLD-MCNC: 209 MG/DL (ref 65–99)
GLUCOSE BLD-MCNC: 212 MG/DL (ref 65–99)
GLUCOSE BLD-MCNC: 215 MG/DL (ref 65–99)
GLUCOSE BLD-MCNC: 84 MG/DL (ref 65–99)
GLUCOSE SERPL-MCNC: 135 MG/DL (ref 65–99)
POTASSIUM SERPL-SCNC: 3.8 MMOL/L (ref 3.6–5.5)
SODIUM SERPL-SCNC: 137 MMOL/L (ref 135–145)

## 2018-06-25 PROCEDURE — 770022 HCHG ROOM/CARE - ICU (200)

## 2018-06-25 PROCEDURE — 700102 HCHG RX REV CODE 250 W/ 637 OVERRIDE(OP): Performed by: INTERNAL MEDICINE

## 2018-06-25 PROCEDURE — C9113 INJ PANTOPRAZOLE SODIUM, VIA: HCPCS | Performed by: HOSPITALIST

## 2018-06-25 PROCEDURE — 80048 BASIC METABOLIC PNL TOTAL CA: CPT

## 2018-06-25 PROCEDURE — 700105 HCHG RX REV CODE 258: Performed by: INTERNAL MEDICINE

## 2018-06-25 PROCEDURE — 700102 HCHG RX REV CODE 250 W/ 637 OVERRIDE(OP): Performed by: FAMILY MEDICINE

## 2018-06-25 PROCEDURE — 700111 HCHG RX REV CODE 636 W/ 250 OVERRIDE (IP): Performed by: HOSPITALIST

## 2018-06-25 PROCEDURE — 99233 SBSQ HOSP IP/OBS HIGH 50: CPT | Performed by: HOSPITALIST

## 2018-06-25 PROCEDURE — C1751 CATH, INF, PER/CENT/MIDLINE: HCPCS

## 2018-06-25 PROCEDURE — 700111 HCHG RX REV CODE 636 W/ 250 OVERRIDE (IP): Performed by: INTERNAL MEDICINE

## 2018-06-25 PROCEDURE — A9270 NON-COVERED ITEM OR SERVICE: HCPCS | Performed by: INTERNAL MEDICINE

## 2018-06-25 PROCEDURE — A9270 NON-COVERED ITEM OR SERVICE: HCPCS | Performed by: FAMILY MEDICINE

## 2018-06-25 PROCEDURE — 99233 SBSQ HOSP IP/OBS HIGH 50: CPT | Performed by: INTERNAL MEDICINE

## 2018-06-25 PROCEDURE — 71045 X-RAY EXAM CHEST 1 VIEW: CPT

## 2018-06-25 PROCEDURE — 82010 KETONE BODYS QUAN: CPT

## 2018-06-25 PROCEDURE — 82962 GLUCOSE BLOOD TEST: CPT | Mod: 91

## 2018-06-25 RX ORDER — SODIUM CHLORIDE, SODIUM LACTATE, POTASSIUM CHLORIDE, CALCIUM CHLORIDE 600; 310; 30; 20 MG/100ML; MG/100ML; MG/100ML; MG/100ML
INJECTION, SOLUTION INTRAVENOUS CONTINUOUS
Status: DISCONTINUED | OUTPATIENT
Start: 2018-06-25 | End: 2018-06-28

## 2018-06-25 RX ORDER — DEXTROSE, SODIUM CHLORIDE, SODIUM LACTATE, POTASSIUM CHLORIDE, AND CALCIUM CHLORIDE 5; .6; .31; .03; .02 G/100ML; G/100ML; G/100ML; G/100ML; G/100ML
INJECTION, SOLUTION INTRAVENOUS CONTINUOUS
Status: DISCONTINUED | OUTPATIENT
Start: 2018-06-25 | End: 2018-06-25

## 2018-06-25 RX ORDER — INSULIN GLARGINE 100 [IU]/ML
20 INJECTION, SOLUTION SUBCUTANEOUS EVERY EVENING
Status: DISCONTINUED | OUTPATIENT
Start: 2018-06-25 | End: 2018-06-26

## 2018-06-25 RX ORDER — DEXTROSE MONOHYDRATE 25 G/50ML
25 INJECTION, SOLUTION INTRAVENOUS
Status: DISCONTINUED | OUTPATIENT
Start: 2018-06-25 | End: 2018-06-29 | Stop reason: HOSPADM

## 2018-06-25 RX ORDER — POTASSIUM CHLORIDE 14.9 MG/ML
20 INJECTION INTRAVENOUS ONCE
Status: COMPLETED | OUTPATIENT
Start: 2018-06-25 | End: 2018-06-25

## 2018-06-25 RX ADMIN — DIPHENHYDRAMINE HYDROCHLORIDE 50 MG: 50 INJECTION, SOLUTION INTRAMUSCULAR; INTRAVENOUS at 00:47

## 2018-06-25 RX ADMIN — SUCRALFATE 1 G: 1 SUSPENSION ORAL at 20:00

## 2018-06-25 RX ADMIN — METOCLOPRAMIDE 10 MG: 5 INJECTION, SOLUTION INTRAMUSCULAR; INTRAVENOUS at 11:03

## 2018-06-25 RX ADMIN — INSULIN HUMAN 4 UNITS: 100 INJECTION, SOLUTION PARENTERAL at 04:03

## 2018-06-25 RX ADMIN — INSULIN HUMAN 3 UNITS: 100 INJECTION, SOLUTION PARENTERAL at 11:04

## 2018-06-25 RX ADMIN — INSULIN GLARGINE 20 UNITS: 100 INJECTION, SOLUTION SUBCUTANEOUS at 00:53

## 2018-06-25 RX ADMIN — POTASSIUM CHLORIDE 20 MEQ: 200 INJECTION, SOLUTION INTRAVENOUS at 00:43

## 2018-06-25 RX ADMIN — INSULIN GLARGINE 20 UNITS: 100 INJECTION, SOLUTION SUBCUTANEOUS at 20:26

## 2018-06-25 RX ADMIN — METOCLOPRAMIDE 10 MG: 5 INJECTION, SOLUTION INTRAMUSCULAR; INTRAVENOUS at 20:00

## 2018-06-25 RX ADMIN — PANTOPRAZOLE SODIUM 40 MG: 40 INJECTION, POWDER, LYOPHILIZED, FOR SOLUTION INTRAVENOUS at 20:00

## 2018-06-25 RX ADMIN — PROCHLORPERAZINE EDISYLATE 5 MG: 5 INJECTION INTRAMUSCULAR; INTRAVENOUS at 15:06

## 2018-06-25 RX ADMIN — PROCHLORPERAZINE EDISYLATE 5 MG: 5 INJECTION INTRAMUSCULAR; INTRAVENOUS at 23:46

## 2018-06-25 RX ADMIN — METOCLOPRAMIDE 10 MG: 5 INJECTION, SOLUTION INTRAMUSCULAR; INTRAVENOUS at 17:03

## 2018-06-25 RX ADMIN — KETOROLAC TROMETHAMINE 30 MG: 30 INJECTION, SOLUTION INTRAMUSCULAR at 11:03

## 2018-06-25 RX ADMIN — INSULIN HUMAN 3 UNITS: 100 INJECTION, SOLUTION PARENTERAL at 16:59

## 2018-06-25 RX ADMIN — LORAZEPAM 1 MG: 2 INJECTION INTRAMUSCULAR; INTRAVENOUS at 00:47

## 2018-06-25 RX ADMIN — STANDARDIZED SENNA CONCENTRATE AND DOCUSATE SODIUM 2 TABLET: 8.6; 5 TABLET, FILM COATED ORAL at 20:00

## 2018-06-25 RX ADMIN — OLANZAPINE 5 MG: 5 TABLET, FILM COATED ORAL at 20:00

## 2018-06-25 RX ADMIN — METOCLOPRAMIDE 10 MG: 5 INJECTION, SOLUTION INTRAMUSCULAR; INTRAVENOUS at 06:44

## 2018-06-25 RX ADMIN — ACETAMINOPHEN 650 MG: 325 TABLET, FILM COATED ORAL at 20:00

## 2018-06-25 RX ADMIN — SUCRALFATE 1 G: 1 SUSPENSION ORAL at 23:45

## 2018-06-25 RX ADMIN — ENOXAPARIN SODIUM 40 MG: 100 INJECTION SUBCUTANEOUS at 20:00

## 2018-06-25 RX ADMIN — GABAPENTIN 100 MG: 100 CAPSULE ORAL at 20:00

## 2018-06-25 RX ADMIN — PROCHLORPERAZINE EDISYLATE 5 MG: 5 INJECTION INTRAMUSCULAR; INTRAVENOUS at 06:44

## 2018-06-25 RX ADMIN — SODIUM CHLORIDE, SODIUM LACTATE, POTASSIUM CHLORIDE, CALCIUM CHLORIDE AND DEXTROSE MONOHYDRATE: 5; 600; 310; 30; 20 INJECTION, SOLUTION INTRAVENOUS at 00:30

## 2018-06-25 RX ADMIN — SODIUM CHLORIDE, POTASSIUM CHLORIDE, SODIUM LACTATE AND CALCIUM CHLORIDE: 600; 310; 30; 20 INJECTION, SOLUTION INTRAVENOUS at 03:15

## 2018-06-25 RX ADMIN — SODIUM CHLORIDE, POTASSIUM CHLORIDE, SODIUM LACTATE AND CALCIUM CHLORIDE: 600; 310; 30; 20 INJECTION, SOLUTION INTRAVENOUS at 20:07

## 2018-06-25 RX ADMIN — SODIUM CHLORIDE, POTASSIUM CHLORIDE, SODIUM LACTATE AND CALCIUM CHLORIDE: 600; 310; 30; 20 INJECTION, SOLUTION INTRAVENOUS at 11:38

## 2018-06-25 RX ADMIN — PANTOPRAZOLE SODIUM 40 MG: 40 INJECTION, POWDER, LYOPHILIZED, FOR SOLUTION INTRAVENOUS at 08:17

## 2018-06-25 ASSESSMENT — ENCOUNTER SYMPTOMS
SHORTNESS OF BREATH: 0
BLOOD IN STOOL: 0
FOCAL WEAKNESS: 0
EYES NEGATIVE: 1
SPUTUM PRODUCTION: 0
ABDOMINAL PAIN: 1
DIAPHORESIS: 0
HALLUCINATIONS: 0
MUSCULOSKELETAL NEGATIVE: 1
BLURRED VISION: 0
CONSTITUTIONAL NEGATIVE: 1
PSYCHIATRIC NEGATIVE: 1
NAUSEA: 1
CARDIOVASCULAR NEGATIVE: 1
SEIZURES: 0
CHILLS: 0
ORTHOPNEA: 0
PHOTOPHOBIA: 0
HEARTBURN: 0
VOMITING: 0
DIARRHEA: 0
EYE PAIN: 0
STRIDOR: 0
FEVER: 0
CLAUDICATION: 0
DEPRESSION: 0
CONSTIPATION: 0
SINUS PAIN: 0
RESPIRATORY NEGATIVE: 1
BACK PAIN: 0
EYE DISCHARGE: 0
NEUROLOGICAL NEGATIVE: 1
ABDOMINAL PAIN: 0
COUGH: 0
NECK PAIN: 0
HEMOPTYSIS: 0
DOUBLE VISION: 0
NERVOUS/ANXIOUS: 0
POLYDIPSIA: 0
VOMITING: 1
DIZZINESS: 0
LOSS OF CONSCIOUSNESS: 0
SPEECH CHANGE: 0
BRUISES/BLEEDS EASILY: 0
MYALGIAS: 0
SENSORY CHANGE: 0
PALPITATIONS: 0
HEADACHES: 0

## 2018-06-25 ASSESSMENT — PAIN SCALES - GENERAL
PAINLEVEL_OUTOF10: 0
PAINLEVEL_OUTOF10: 5
PAINLEVEL_OUTOF10: 2
PAINLEVEL_OUTOF10: 2
PAINLEVEL_OUTOF10: 9
PAINLEVEL_OUTOF10: 0
PAINLEVEL_OUTOF10: 2
PAINLEVEL_OUTOF10: 2

## 2018-06-25 NOTE — PROGRESS NOTES
Spoke with PharmPAULO Malave in regards to pt 's insulin gtt and glucose levels.    Frieda spoke with Dr. Alan, and orders to pause insulin gtt now and give lantus dose now.

## 2018-06-25 NOTE — PROGRESS NOTES
Date of Insertion: 6/25/2018  Arm Circumference: 32  Internal length: 42  External Length: 0  Vein Occupancy %: N/A  Reason for PICC: Critical care/TPN  Labs: WBC 5.3, , INR n/a, BUN 3, Cr 0.51, GFR >60    Consents confirmed, vessel patency confirmed with ultrasound. Risks and benefits of procedure explained to patient and education regarding central line associated bloodstream infections provided. Questions answered.     PICC placed in RUE per MD order with ultrasound guidance, initial arm circumference 32cm. 5 Fr, double lumen PICC placed in basilic vein after 1 attempt(s). 2 cc's of 1% lidocaine injected intradermally, 21 gauge microintroducer needle and modified Seldinger technique used. 42 cm catheter inserted with good blood return. Secured at 0 cm marker. Each lumen flushed without resistance with 10 mL 0.9% normal saline. PICC line secured with Biopatch and Tegaderm.     PICC placement is confirmed by nurse using 3CG technology. PICC line is appropriate for use at this time. Pt tolerated procedure well.  Patient condition relayed to unit RN or ordering physician via this post procedure note in the EMR.      BARD Power PICC ref # [id number], Lot # [id number]    Radiologist called PICC RN following R IJ removal. PICC read deep by radiology- recommended to pull out 2cm. Confirmed with Dr. Gayle and PICC pulled back 2cm and re-dressed.

## 2018-06-25 NOTE — PROGRESS NOTES
Updated Dr. Alan on pt attempting to get out of bed and sliding to ground. FSBG taken (113), pt assessed and AOx4, no c/o new pain (still complaints of baseline abdominal pain).    No new orders received.

## 2018-06-25 NOTE — PROGRESS NOTES
Gastroenterology Progress Note     Author: Lior Child   Date & Time Created: 6/25/2018 1:12 PM    Chief Complaint:  Nausea/vomiting/abdominal pain    Interval History:  6/22/2018: Only 1 episode of vomiting in the last 24 hours.  Still have ongoing epigastric pain. Epigastric without trigger, recurrent not associated with food. No BM since admission. Passing flatus.  6/23/2018: Transferred to ICU due to low bicarbonate to start DKA protocol.  6/24/2018: More calm, no abdominal pain today. Patient nurse states only 2 episodes of emesis in the last 24 hours. Tachycardia improving.  6/25/2018: No vomiting today, denies nausea.  Epigastric soreness not necessarily pain.      Review of Systems:  Review of Systems   Constitutional: Negative.    HENT: Negative.    Eyes: Negative.    Respiratory: Negative.    Cardiovascular: Negative.    Gastrointestinal: Positive for abdominal pain, nausea and vomiting. Negative for blood in stool, constipation, diarrhea, heartburn and melena.   Genitourinary: Negative.    Musculoskeletal: Negative.    Skin: Negative.    Neurological: Negative.    Endo/Heme/Allergies: Negative.    Psychiatric/Behavioral: Negative.        Physical Exam:  Physical Exam   Constitutional: She is oriented to person, place, and time. She appears well-developed and well-nourished.   HENT:   Head: Normocephalic and atraumatic.   Eyes: Conjunctivae are normal. Pupils are equal, round, and reactive to light.   Neck: Normal range of motion. Neck supple.   Cardiovascular: Normal rate and regular rhythm.    Pulmonary/Chest: Effort normal and breath sounds normal.   Abdominal: Soft. Bowel sounds are normal. She exhibits no distension and no mass. There is no tenderness. There is no rebound and no guarding.   Musculoskeletal: Normal range of motion.   Neurological: She is alert and oriented to person, place, and time.   Skin: Skin is warm and dry.       Labs:        Invalid input(s): [id number]      Recent Labs       18   0718   0745   SODIUM  131*   < >  134*   < >  135   < >  136  134*  137   POTASSIUM  4.5   < >  3.5*   < >  3.4*   < >  3.7  3.6  3.8   CHLORIDE  108   < >  114*   < >  113*   < >  116*  115*  115*   CO2  5*   < >  10*   < >  13*   < >  13*  15*  15*   BUN  5*   < >  3*   < >  4*   < >  3*  3*  3*   CREATININE  0.53   < >  0.58   < >  0.45*   < >  0.59  0.54  0.51   MAGNESIUM  1.6   --   1.6   --   1.4*   --    --    --    --    PHOSPHORUS  2.6   --   1.5*   --   2.7   --    --    --    --    CALCIUM  8.6   < >  8.7   < >  8.4*   < >  8.2*  8.1*  8.6    < > = values in this interval not displayed.     Recent Labs      18   0745   ALTSGPT  13   --   11   --    --    --    --    ASTSGOT  11*   --   10*   --    --    --    --    ALKPHOSPHAT  97   --   85   --    --    --    --    TBILIRUBIN  0.5   --   0.6   --    --    --    --    GLUCOSE  133*   < >  117*   < >  134*  105*  135*    < > = values in this interval not displayed.     Recent Labs      18   06   RBC  4.72  4.00*   HEMOGLOBIN  13.8  11.6*   HEMATOCRIT  40.7  33.7*   PLATELETCT  233  199     Recent Labs      18   06   WBC  8.3   --    --   5.3   NEUTSPOLYS  77.60*   --    --   43.20*   LYMPHOCYTES  14.60*   --    --   44.10*   MONOCYTES  6.20   --    --   8.70   EOSINOPHILS  0.40   --    --   3.00   BASOPHILS  0.60   --    --   0.60   ASTSGOT   --   11*  10*   --    ALTSGPT   --   13  11   --    ALKPHOSPHAT   --   97  85   --    TBILIRUBIN   --   0.5  0.6   --      Hemodynamics:  Temp (24hrs), Av.8 °C (98.2 °F), Min:36.3 °C (97.4 °F), Max:37.3 °C (99.1 °F)  Temperature: 36.6 °C (97.8 °F)  Pulse  Av.9  Min: 60  Max: 166Heart Rate (Monitored): 94  NIBP: 136/89     Respiratory:    Respiration: (!) 22,  Pulse Oximetry: 98 %        RUL Breath Sounds: Clear;Diminished, RML Breath Sounds: Clear;Diminished, RLL Breath Sounds: Diminished;Clear, NANCY Breath Sounds: Clear;Diminished, LLL Breath Sounds: Diminished;Clear  Fluids:    Intake/Output Summary (Last 24 hours) at 06/22/18 1556  Last data filed at 06/22/18 1400   Gross per 24 hour   Intake                0 ml   Output              750 ml   Net             -750 ml        GI/Nutrition:  Orders Placed This Encounter   Procedures   • Diet Order Diabetic, Full Liquid     Standing Status:   Standing     Number of Occurrences:   1     Order Specific Question:   Diet:     Answer:   Diabetic [3]     Order Specific Question:   Diet:     Answer:   Full Liquid [11]     Medical Decision Making, by Problem:  Active Hospital Problems    Diagnosis   • *Diabetic ketoacidosis (HCC) [E13.10]   • Intractable nausea and vomiting [R11.2]   • Gastroparesis due to DM (HCC) [E11.43, K31.84]   • Esophagitis [K20.9]   • Type 1 diabetes mellitus (HCC) [E10.9]   • Pyloric stenosis [K31.1]   • Anemia [D64.9]   • Hypomagnesemia [E83.42]   • Hypokalemia [E87.6]     EGD 6/9/2018  IMPRESSIONS AND FINDINGS:  1.  [place] classification grade C ulcerative esophagitis -- likely the   result the patient's nausea and vomiting.  2.  Gastritis, suspected.  3.  Pyloric stenosis, possible, status post dilation.           Imaging:  UGI 6/19/2018  Examination is significantly limited as the patient vomited the majority of ingested contrast and refused to take more contrast by mouth.  No evidence of gastric outlet obstruction.  There may be mild gastric fold thickening which can be seen in the setting of gastritis.  No evidence of malrotation.     KUB 6/15/2018  No evidence for bowel obstruction.        HIDA 6/10/2018  1. Normal hepatobiliary scan. No evidence of acute cholecystitis.  2. Normal gallbladder ejection fraction.     US 6/9/2018  Heterogeneously echogenic lesion within the right lobe of the liver  measuring 3 x 2.8 x 2.2 cm may represent a hemangioma and was seen on prior ultrasound. Confirmation can be obtained with hepatic protocol MRI.  Hepatomegaly.  No evidence of gallstones or biliary ductal dilatation.     CT 6/3/2018  1.  Thickening of the distal esophageal wall, new since prior study, consider component of esophagitis. Could be followed up with endoscopy for further evaluation as clinically appropriate.  2.  Hepatomegaly      Impressions:  1. Intractable nausea/vomiting.  Much improved.  2. Insulin dependent Type 1 Diabetes  3. Diabetic ketoacidosis - currently being managed.  4. Abdominal pain  5. Possible gastroparesis (however 2014 normal emptying study)          Recommendations:  1. Continue Benadryl 50mg scheduled and Ativan scheduled for today, wean tomorrow..   2. Continue Reglan, compazine. Certainly concerned for risk of tardive with multiple CNS meds  3. Doesn't like soup, doesn't want sugary foods.  Was eating inappropriately at home for a patient with gastroparesis if this is her diagnosis.  Reviewed gastric physiology and how to eat if gastroparesis  Quality-Core Measures

## 2018-06-25 NOTE — PROGRESS NOTES
Pulmonary Critical Care Progress Note      Date of service:  6/25/2018    Chief Complaint:  Diabetes    Interval Events:  24 hour interval history reviewed  Reason for visit:  Diabetes mellitus, gastroparesis       - SR   - check BMP, beta-hydroxy   - start diet   - fall last night      She is sleepy today.  She had a fall last night.  She does not injure herself.  She denies headache.  She has no cough, sputum production, shortness of breath or hemoptysis.  Her nausea and vomiting have improved.  She denies abdominal pain.  She has no angina, palpitations or syncope.      Review of Systems   Constitutional: Negative for chills, diaphoresis and fever.   HENT: Negative for congestion, ear discharge, ear pain, hearing loss, nosebleeds and sinus pain.    Eyes: Negative for blurred vision, double vision, photophobia, pain and discharge.   Respiratory: Negative for cough, hemoptysis, sputum production, shortness of breath and stridor.    Cardiovascular: Negative for chest pain, palpitations, orthopnea, claudication and leg swelling.   Gastrointestinal: Positive for nausea (Improved). Negative for abdominal pain, constipation, diarrhea and vomiting.   Genitourinary: Negative for dysuria, frequency, hematuria and urgency.   Musculoskeletal: Negative for back pain, joint pain, myalgias and neck pain.   Skin: Negative for itching and rash.   Neurological: Negative for sensory change, speech change, focal weakness, seizures, loss of consciousness and headaches.   Endo/Heme/Allergies: Negative for environmental allergies and polydipsia. Does not bruise/bleed easily.   Psychiatric/Behavioral: Negative for depression, hallucinations and suicidal ideas. The patient is not nervous/anxious.        Physical Exam   Constitutional: No distress.   HENT:   Head: Normocephalic and atraumatic.   Right Ear: External ear normal.   Left Ear: External ear normal.   Nose: Nose normal.   Mouth/Throat: Oropharynx is clear and moist.   Eyes:  Conjunctivae and EOM are normal. Pupils are equal, round, and reactive to light. Right eye exhibits no discharge. Left eye exhibits no discharge. No scleral icterus.   Neck: Normal range of motion. Neck supple. No JVD present. No tracheal deviation present.   Cardiovascular: Intact distal pulses.  Exam reveals no gallop and no friction rub.    No murmur heard.  Sinus rhythm   Pulmonary/Chest: Effort normal and breath sounds normal. No stridor. No respiratory distress. She has no wheezes. She has no rales.   Abdominal: Soft. Bowel sounds are normal. She exhibits no distension. There is no tenderness. There is no rebound.   Musculoskeletal: She exhibits no tenderness or deformity.   No clubbing or cyanosis   Neurological: No cranial nerve deficit.   Somnolent.  Arouses easily.  Answers questions appropriately.  Moves all 4 extremities.   Skin: Skin is warm and dry. No rash noted. She is not diaphoretic. No erythema. No pallor.   Psychiatric: Memory normal.       PFSH:  No change.    Respiratory:     Pulse Oximetry: 99 %  CXR images personally reviewed and compared to prior images  CXR with clear lungs    HemoDynamics:  Pulse: 99, Heart Rate (Monitored): 99  Blood Pressure: 113/76, NIBP: 118/76       Neuro:    Fluids:  Intake/Output       06/23/18 0700 - 06/24/18 0659 06/24/18 0700 - 06/25/18 0659 06/25/18 0700 - 06/26/18 0659      5650-7771 8482-8836 Total 6826-2593 7066-0945 Total 0044-9549 7827-1407 Total       Intake    I.V.  2000  2423.1 4423.1  1524  1555.2 3079.2  --  -- --    Magnesium Sulfate Volume -- 250 250 -- -- -- -- -- --    Insulin Volume -- 283.1 283.1 24 55.2 79.2 -- -- --    D10%0.45%NS -- 1290 1290 9877 124 3965 -- -- --    IV Volume (NSS) 2000 -- 2000 -- -- -- -- -- --    IV Volume (Potassium) -- 600 600 -- -- -- -- -- --    IV Volume (D5 LR) -- -- -- -- 250 250 -- -- --    IV Volume (LR) -- -- -- -- 500 500 -- -- --    Total Intake 2000 2423.1 4423.1 1524 1555.2 3079.2 -- -- --       Output     Urine  2700  0 2700  2300  750 3050  --  -- --    Number of Times Voided -- 0 x 0 x -- -- -- -- -- --    Urine Void (mL) (non-catheter) 2700 0 2700 2300 750 3050 -- -- --    Emesis  650  100 750  --  -- --  --  -- --    Emesis 650 100 750 -- -- -- -- -- --    Total Output 3350 100 3450 2300 750 3050 -- -- --       Net I/O     -1350 2323.1 973.1 -776 805.2 29.2 -- -- --           Recent Labs      18   0727   18   1821   18   0215   18   1400  18   1740  18   SODIUM  131*   < >  134*   < >  135   < >  134*  136  134*   POTASSIUM  4.5   < >  3.5*   < >  3.4*   < >  3.5*  3.7  3.6   CHLORIDE  108   < >  114*   < >  113*   < >  113*  116*  115*   CO2  5*   < >  10*   < >  13*   < >  15*  13*  15*   BUN  5*   < >  3*   < >  4*   < >  3*  3*  3*   CREATININE  0.53   < >  0.58   < >  0.45*   < >  0.48*  0.59  0.54   MAGNESIUM  1.6   --   1.6   --   1.4*   --    --    --    --    PHOSPHORUS  2.6   --   1.5*   --   2.7   --    --    --    --    CALCIUM  8.6   < >  8.7   < >  8.4*   < >  7.7*  8.2*  8.1*    < > = values in this interval not displayed.       GI/Nutrition:    Liver Function  Recent Labs      18   1821   18   0215   18   1400  18   1740  18   ALTSGPT  13   --   11   --    --    --    --    ASTSGOT  11*   --   10*   --    --    --    --    ALKPHOSPHAT  97   --   85   --    --    --    --    TBILIRUBIN  0.5   --   0.6   --    --    --    --    GLUCOSE  133*   < >  117*   < >  146*  134*  105*    < > = values in this interval not displayed.       Heme:  Recent Labs      18   0406  18   0605   RBC  4.72  4.00*   HEMOGLOBIN  13.8  11.6*   HEMATOCRIT  40.7  33.7*   PLATELETCT  233  199       Infectious Disease:  Temp  Av.8 °C (98.2 °F)  Min: 36.2 °C (97.2 °F)  Max: 37.3 °C (99.1 °F)    Recent Labs      18   0406  18   1821  18   0215  18   0605   WBC  8.3   --    --   5.3   NEUTSPOLYS  77.60*   --     --   43.20*   LYMPHOCYTES  14.60*   --    --   44.10*   MONOCYTES  6.20   --    --   8.70   EOSINOPHILS  0.40   --    --   3.00   BASOPHILS  0.60   --    --   0.60   ASTSGOT   --   11*  10*   --    ALTSGPT   --   13  11   --    ALKPHOSPHAT   --   97  85   --    TBILIRUBIN   --   0.5  0.6   --      Current Facility-Administered Medications   Medication Dose Frequency Provider Last Rate Last Dose   • insulin regular (HUMULIN R) injection 3-14 Units  3-14 Units Q6HRS Cholo Alan Jr., D.O.   4 Units at 06/25/18 0403    And   • glucose 4 g chewable tablet 16 g  16 g Q15 MIN PRN Cholo Alan Jr., D.O.        And   • dextrose 50% (D50W) injection 25 mL  25 mL Q15 MIN PRN Cholo Alan Jr. D.O.       • insulin glargine (LANTUS) injection 20 Units  20 Units Q EVENING Cholo Alan Jr. D.O.   20 Units at 06/25/18 0053   • lactated ringers infusion   Continuous Cholo Alan Jr., D.O.       • LORazepam (ATIVAN) injection 1 mg  1 mg 4XDAY Lior Child M.D.   1 mg at 06/25/18 0047   • diphenhydrAMINE (BENADRYL) injection 50 mg  50 mg Q6HRS Lior Child M.D.   50 mg at 06/25/18 0047   • OLANZapine (ZYPREXA) tablet 5 mg  5 mg Q EVENING Arnulfo Gavin D.O.   Stopped at 06/22/18 2159   • morphine (pf) 4 mg/ml injection 3 mg  3 mg Q4HRS PRN Arnulfo Gavin D.O.   3 mg at 06/24/18 1115   • ketorolac (TORADOL) injection 30 mg  30 mg Q6HRS PRN Arnulfo Gavin D.O.   30 mg at 06/24/18 2039   • metoclopramide (REGLAN) injection 10 mg  10 mg 4X/DAY ACHS Arnulfo Gavin D.O.   10 mg at 06/24/18 2039   • prochlorperazine (COMPAZINE) injection 5 mg  5 mg Q8HRS Pa Urbina M.D.   5 mg at 06/24/18 2214   • pantoprazole (PROTONIX) injection 40 mg  40 mg BID Pa Urbina M.D.   40 mg at 06/24/18 2039   • glucose 4 g chewable tablet 16 g  16 g Q15 MIN PRN Pa Urbina M.D.        And   • dextrose 50% (D50W) injection 25 mL  25 mL Q15 MIN PRN Pa Urbina M.D.   25 mL at 06/19/18 1646   • enoxaparin (LOVENOX) inj  40 mg  40 mg QHS Pa Urbina M.D.   40 mg at 06/24/18 2039   • senna-docusate (PERICOLACE or SENOKOT S) 8.6-50 MG per tablet 2 Tab  2 Tab BID Shara Hurd M.D.   Stopped at 06/13/18 0900    And   • polyethylene glycol/lytes (MIRALAX) PACKET 1 Packet  1 Packet QDAY PRN Shara Hurd M.D.        And   • magnesium hydroxide (MILK OF MAGNESIA) suspension 30 mL  30 mL QDAY PRN Shara Hurd M.D.        And   • bisacodyl (DULCOLAX) suppository 10 mg  10 mg QDAY PRN Shara Hurd M.D.       • labetalol (NORMODYNE,TRANDATE) injection 10 mg  10 mg Q4HRS PRRONALD Hurd M.D.       • ondansetron (ZOFRAN) syringe/vial injection 4 mg  4 mg Q4HRS PRRONALD Hurd M.D.   4 mg at 06/23/18 1554   • ondansetron (ZOFRAN ODT) dispertab 4 mg  4 mg Q4HRS PRRONALD Hurd M.D.       • prochlorperazine (COMPAZINE) injection 5-10 mg  5-10 mg Q4HRS PRRONALD Hurd M.D.   10 mg at 06/22/18 0101   • acetaminophen (TYLENOL) tablet 650 mg  650 mg Q6HRS PRN Shara Hurd M.D.       • atorvastatin (LIPITOR) tablet 20 mg  20 mg DAILY Shara Hurd M.D.   20 mg at 06/22/18 0852   • cholecalciferol (VITAMIN D3) tablet 400 Units  400 Units DAILY Shara Hurd M.D.   400 Units at 06/22/18 0852   • gabapentin (NEURONTIN) capsule 100 mg  100 mg BID Shara Hurd M.D.   Stopped at 06/22/18 2159   • sucralfate (CARAFATE) 1 GM/10ML suspension 1 g  1 g Q6HRS Shara Hurd M.D.   1 g at 06/21/18 1723     Last reviewed on 6/13/2018  8:14 AM by Blade Darden    Quality  Measures:  Labs reviewed, Medications reviewed and Radiology images reviewed  Pitt catheter: No Pitt  Central line in place: Need for access    DVT Prophylaxis: Enoxaparin (Lovenox)  DVT prophylaxis - mechanical: SCDs  Ulcer prophylaxis: Not indicated          Assessment and Plan:    Recurrent acute diabetic ketoacidosis   -Resolved   -Monitor acid-base status closely   -High risk of DKA recurrence    DM  type I   -Continue current dose of Lantus and sliding scale insulin   -Diabetic diet    Severe gastroparesis   -S/P EGD and pyloric dilation for possible gastric outlet obstruction vs cyclical vomiting   -Continue current dose of Benadryl, lorazepam, Reglan and Compazine    Constipation   -Continue bowel protocol      Discussed with RN, RT, team, clinical pharmacist, hospitalist

## 2018-06-25 NOTE — PROGRESS NOTES
Frieda TianD called in regards to pt's FSBG = 212. Holding D5LR for 30 minutes at this time and then rechecking FSBG    Orders to stop D5LR and start LR at 125 received

## 2018-06-25 NOTE — DIETARY
Nutrition services: Day 12 of admit.  26 yo female with admitting diagnosis: DKA  Follow up for pt with poor po intake    Assessment/Evaluation:  1. Pt with poor intake PTA and inability to advance diet or take adequate po during this admission. Pt had been NPO or clear liquids with diet advanced to diabetic full liquids today. <25% of breakfast taken. Boost glucose control supplements added to meals today for ease of nutrition consumption.  2. Pt with history of recurrent uncontrolled DM. Recent EGD showed erosive esophagitis and gastritis with pyloric stenosis. Pt did have a pyloric dilation.    3. Frequent admits showing weight over the last few months has been fluctuating within 1 kg - 81 - 82 kg.  Stand up scale weight on 6/11 was 82.7 kg which is now decreased 3.8 kg to 78.9 kg bed scale yesterday.  5% weight loss over 2 weeks is considered severe weight loss.      Malnutrition: pt appears severely malnourished as evidenced by pt with 5% severe weight loss over 2 weeks and inadequate nutrition for >12 days     Recommendations/Plan:  1. Malnourished pt is in need of nutrition support. Consider placement of small bowel cortrak with tube feedings  2. If unable to place cortrak recommend starting TPN now  3. Encourage intake of meals and supplements  4. Document intake of all meals and supplements as % taken in ADL's to provide interdisciplinary communication across all shifts   5. Monitor daily weight   6. Meals can be modified to meet pt preference and tolerance.

## 2018-06-25 NOTE — CARE PLAN
Problem: Safety  Goal: Will remain free from injury  Outcome: PROGRESSING AS EXPECTED  Bed rails up x3, bed alarm on, call light with in reach, frequent rounding in place      Problem: Pain Management  Goal: Pain level will decrease to patient's comfort goal  Outcome: PROGRESSING AS EXPECTED  Pt pain assessed using appropriate pain scale, 1-10 and treated per MAR. Non-pharm interventions in place such as re-positioning and temperature therapy and mobilization

## 2018-06-25 NOTE — PROGRESS NOTES
[facility] Hospitalist Progress Note    Date of Service: 2018    Chief Complaint  27 y.o. female admitted 2018 with PMHx of TIDM poorly controled and diabetic gastroparesis.  Had an EGD in  showing erosive esophagitis and gastritis with pyloric stenosis which was dialated.  Sent home on PPI and carafate.  Admitted on this occasion with DKA.      Interval Problem Update    Off protocol    Sinus tach 100  -130s  On RA  UOP 1.5 litres/12hrs      Consultants/Specialty  GI  Pulmonology    Disposition  Cont in ICU on DKA protocol    [facility] GI and Pulmonology    Critical care time 34min's: DKA, coordinating care        Review of Systems   Constitutional: Positive for malaise/fatigue. Negative for chills and fever.   Respiratory: Negative for cough and shortness of breath.    Cardiovascular: Negative for chest pain.   Gastrointestinal: Positive for nausea and vomiting. Negative for abdominal pain and diarrhea.   Musculoskeletal: Negative for back pain.   Skin: Negative for rash.   Neurological: Negative for dizziness, loss of consciousness and headaches.      Physical Exam  Laboratory/Imaging   Hemodynamics  Temp (24hrs), Av.8 °C (98.2 °F), Min:36.2 °C (97.2 °F), Max:37.3 °C (99.1 °F)   Temperature: 36.9 °C (98.4 °F)  Pulse  Av  Min: 60  Max: 166 Heart Rate (Monitored): 99  Blood Pressure: 113/76, NIBP: 118/76      Respiratory      Respiration: (!) 24, Pulse Oximetry: 99 %        RUL Breath Sounds: Clear;Diminished, RML Breath Sounds: Clear;Diminished, RLL Breath Sounds: Diminished, NANCY Breath Sounds: Clear;Diminished, LLL Breath Sounds: Diminished    Fluids    Intake/Output Summary (Last 24 hours) at 18 0559  Last data filed at 18 0400   Gross per 24 hour   Intake           3095.2 ml   Output             3050 ml   Net             45.2 ml       Nutrition  Orders Placed This Encounter   Procedures   • Diet NPO     Standing Status:   Standing     Number of Occurrences:   1     Order Specific  Question:   Restrict to:     Answer:   Sips with Medications [3]     Physical Exam   Constitutional: She is oriented to person, place, and time. She appears well-developed and well-nourished. No distress.   HENT:   Head: Normocephalic and atraumatic.   Eyes: No scleral icterus.   Neck: No JVD present.   Cardiovascular: Normal rate and regular rhythm.    Pulmonary/Chest: Effort normal. No stridor. No respiratory distress. She has no wheezes. She has no rales.   Abdominal: Soft. She exhibits no distension. There is tenderness. There is no rebound and no guarding.   Musculoskeletal: She exhibits no edema.   Neurological: She is oriented to person, place, and time.   Skin: Skin is warm and dry. No rash noted. She is not diaphoretic.   Psychiatric: She has a normal mood and affect. Thought content normal.   Nursing note and vitals reviewed.      Recent Labs      06/23/18   0406  06/24/18   0605   WBC  8.3  5.3   RBC  4.72  4.00*   HEMOGLOBIN  13.8  11.6*   HEMATOCRIT  40.7  33.7*   MCV  86.2  84.3   MCH  29.2  29.0   MCHC  33.9  34.4   RDW  39.0  39.4   PLATELETCT  233  199   MPV  10.1  9.7     Recent Labs      06/24/18   1400  06/24/18   1740  06/24/18   2205   SODIUM  134*  136  134*   POTASSIUM  3.5*  3.7  3.6   CHLORIDE  113*  116*  115*   CO2  15*  13*  15*   GLUCOSE  146*  134*  105*   BUN  3*  3*  3*   CREATININE  0.48*  0.59  0.54   CALCIUM  7.7*  8.2*  8.1*             Recent Labs      06/23/18   1821   TRIGLYCERIDE  89          Assessment/Plan     * Diabetic ketoacidosis (HCC)- (present on admission)   Assessment & Plan    Given inconsistent po intake need to watch carefully for re-currence        Intractable nausea and vomiting- (present on admission)   Assessment & Plan      May be due to cyclic vomiting syndrome vs gastroparesis  Ativan/benadryl  Prn anti-emetics  Continue IV Protonix          Gastroparesis due to DM (HCC)- (present on admission)   Assessment & Plan    On ativan and benadryl: plan to cont  through today and wean starting tomorrow  Improved today  Cyclic Vomiting vs gastroparesis  Previous emptying study normal  Cont current regimen next 24hrs  Tolerating some po today          Esophagitis- (present on admission)   Assessment & Plan    And gastritis as well as pyloric stenosis seen on EGD done on 6/9/18.  Continue Carafate and IV Protonix        Type 1 diabetes mellitus (HCC)- (present on admission)   Assessment & Plan    Previously HbA1c 8.8.          Hypomagnesemia   Assessment & Plan    Magnesium has been replentished.         Hypokalemia- (present on admission)   Assessment & Plan    -- change iv fluid to normal saline with KCl 20 meq as the patient has hyponatremia now.  -- monitor level.        Pyloric stenosis   Assessment & Plan    Status post dilatation on 6/9.   GI following           Quality-Core Measures   Reviewed items::  EKG reviewed, Labs reviewed, Medications reviewed and Radiology images reviewed  Pitt catheter::  No Pitt  DVT prophylaxis pharmacological::  Enoxaparin (Lovenox)  DVT prophylaxis - mechanical:  SCDs  Ulcer Prophylaxis::  Yes

## 2018-06-26 LAB
ANION GAP SERPL CALC-SCNC: 7 MMOL/L (ref 0–11.9)
BASOPHILS # BLD AUTO: 0.8 % (ref 0–1.8)
BASOPHILS # BLD: 0.03 K/UL (ref 0–0.12)
BUN SERPL-MCNC: 3 MG/DL (ref 8–22)
CALCIUM SERPL-MCNC: 7.9 MG/DL (ref 8.5–10.5)
CHLORIDE SERPL-SCNC: 111 MMOL/L (ref 96–112)
CO2 SERPL-SCNC: 21 MMOL/L (ref 20–33)
CREAT SERPL-MCNC: 0.5 MG/DL (ref 0.5–1.4)
EOSINOPHIL # BLD AUTO: 0.16 K/UL (ref 0–0.51)
EOSINOPHIL NFR BLD: 4.2 % (ref 0–6.9)
ERYTHROCYTE [DISTWIDTH] IN BLOOD BY AUTOMATED COUNT: 39.3 FL (ref 35.9–50)
GLUCOSE BLD-MCNC: 126 MG/DL (ref 65–99)
GLUCOSE BLD-MCNC: 152 MG/DL (ref 65–99)
GLUCOSE BLD-MCNC: 161 MG/DL (ref 65–99)
GLUCOSE BLD-MCNC: 211 MG/DL (ref 65–99)
GLUCOSE BLD-MCNC: 234 MG/DL (ref 65–99)
GLUCOSE BLD-MCNC: 345 MG/DL (ref 65–99)
GLUCOSE SERPL-MCNC: 227 MG/DL (ref 65–99)
HCT VFR BLD AUTO: 29 % (ref 37–47)
HGB BLD-MCNC: 9.9 G/DL (ref 12–16)
IMM GRANULOCYTES # BLD AUTO: 0.02 K/UL (ref 0–0.11)
IMM GRANULOCYTES NFR BLD AUTO: 0.5 % (ref 0–0.9)
LYMPHOCYTES # BLD AUTO: 2.14 K/UL (ref 1–4.8)
LYMPHOCYTES NFR BLD: 55.7 % (ref 22–41)
MCH RBC QN AUTO: 29.1 PG (ref 27–33)
MCHC RBC AUTO-ENTMCNC: 34.1 G/DL (ref 33.6–35)
MCV RBC AUTO: 85.3 FL (ref 81.4–97.8)
MONOCYTES # BLD AUTO: 0.32 K/UL (ref 0–0.85)
MONOCYTES NFR BLD AUTO: 8.3 % (ref 0–13.4)
NEUTROPHILS # BLD AUTO: 1.17 K/UL (ref 2–7.15)
NEUTROPHILS NFR BLD: 30.5 % (ref 44–72)
NRBC # BLD AUTO: 0 K/UL
NRBC BLD-RTO: 0 /100 WBC
PLATELET # BLD AUTO: 145 K/UL (ref 164–446)
PMV BLD AUTO: 10.2 FL (ref 9–12.9)
POTASSIUM SERPL-SCNC: 3.6 MMOL/L (ref 3.6–5.5)
RBC # BLD AUTO: 3.4 M/UL (ref 4.2–5.4)
SODIUM SERPL-SCNC: 139 MMOL/L (ref 135–145)
WBC # BLD AUTO: 3.8 K/UL (ref 4.8–10.8)

## 2018-06-26 PROCEDURE — 700105 HCHG RX REV CODE 258: Performed by: INTERNAL MEDICINE

## 2018-06-26 PROCEDURE — 700102 HCHG RX REV CODE 250 W/ 637 OVERRIDE(OP): Performed by: FAMILY MEDICINE

## 2018-06-26 PROCEDURE — A9270 NON-COVERED ITEM OR SERVICE: HCPCS | Performed by: INTERNAL MEDICINE

## 2018-06-26 PROCEDURE — 80048 BASIC METABOLIC PNL TOTAL CA: CPT

## 2018-06-26 PROCEDURE — 700111 HCHG RX REV CODE 636 W/ 250 OVERRIDE (IP): Performed by: HOSPITALIST

## 2018-06-26 PROCEDURE — 770006 HCHG ROOM/CARE - MED/SURG/GYN SEMI*

## 2018-06-26 PROCEDURE — 99233 SBSQ HOSP IP/OBS HIGH 50: CPT | Performed by: INTERNAL MEDICINE

## 2018-06-26 PROCEDURE — A9270 NON-COVERED ITEM OR SERVICE: HCPCS | Performed by: HOSPITALIST

## 2018-06-26 PROCEDURE — 700102 HCHG RX REV CODE 250 W/ 637 OVERRIDE(OP): Performed by: INTERNAL MEDICINE

## 2018-06-26 PROCEDURE — 700102 HCHG RX REV CODE 250 W/ 637 OVERRIDE(OP): Performed by: HOSPITALIST

## 2018-06-26 PROCEDURE — 700111 HCHG RX REV CODE 636 W/ 250 OVERRIDE (IP): Performed by: INTERNAL MEDICINE

## 2018-06-26 PROCEDURE — A9270 NON-COVERED ITEM OR SERVICE: HCPCS | Performed by: FAMILY MEDICINE

## 2018-06-26 PROCEDURE — 85025 COMPLETE CBC W/AUTO DIFF WBC: CPT

## 2018-06-26 PROCEDURE — C9113 INJ PANTOPRAZOLE SODIUM, VIA: HCPCS | Performed by: HOSPITALIST

## 2018-06-26 PROCEDURE — 82962 GLUCOSE BLOOD TEST: CPT

## 2018-06-26 PROCEDURE — 99233 SBSQ HOSP IP/OBS HIGH 50: CPT | Performed by: HOSPITALIST

## 2018-06-26 RX ORDER — LORAZEPAM 1 MG/1
1 TABLET ORAL EVERY 6 HOURS PRN
Status: DISCONTINUED | OUTPATIENT
Start: 2018-06-26 | End: 2018-06-29 | Stop reason: HOSPADM

## 2018-06-26 RX ORDER — DIPHENHYDRAMINE HYDROCHLORIDE 50 MG/ML
50 INJECTION INTRAMUSCULAR; INTRAVENOUS EVERY 6 HOURS PRN
Status: DISCONTINUED | OUTPATIENT
Start: 2018-06-26 | End: 2018-06-29 | Stop reason: HOSPADM

## 2018-06-26 RX ORDER — METOCLOPRAMIDE 10 MG/1
10 TABLET ORAL EVERY 6 HOURS
Status: DISCONTINUED | OUTPATIENT
Start: 2018-06-26 | End: 2018-06-29 | Stop reason: HOSPADM

## 2018-06-26 RX ORDER — LORAZEPAM 1 MG/1
1 TABLET ORAL EVERY 6 HOURS
Status: DISCONTINUED | OUTPATIENT
Start: 2018-06-26 | End: 2018-06-26

## 2018-06-26 RX ORDER — INSULIN GLARGINE 100 [IU]/ML
20 INJECTION, SOLUTION SUBCUTANEOUS 2 TIMES DAILY
Status: DISCONTINUED | OUTPATIENT
Start: 2018-06-26 | End: 2018-06-27

## 2018-06-26 RX ORDER — TRAMADOL HYDROCHLORIDE 50 MG/1
50 TABLET ORAL EVERY 6 HOURS PRN
Status: DISCONTINUED | OUTPATIENT
Start: 2018-06-26 | End: 2018-06-29 | Stop reason: HOSPADM

## 2018-06-26 RX ORDER — POTASSIUM CHLORIDE 20 MEQ/1
40 TABLET, EXTENDED RELEASE ORAL ONCE
Status: COMPLETED | OUTPATIENT
Start: 2018-06-26 | End: 2018-06-26

## 2018-06-26 RX ADMIN — INSULIN HUMAN 4 UNITS: 100 INJECTION, SOLUTION PARENTERAL at 05:48

## 2018-06-26 RX ADMIN — INSULIN HUMAN 10 UNITS: 100 INJECTION, SOLUTION PARENTERAL at 17:03

## 2018-06-26 RX ADMIN — METOCLOPRAMIDE HYDROCHLORIDE 10 MG: 10 TABLET ORAL at 17:03

## 2018-06-26 RX ADMIN — TRAMADOL HYDROCHLORIDE 50 MG: 50 TABLET, COATED ORAL at 16:45

## 2018-06-26 RX ADMIN — SUCRALFATE 1 G: 1 SUSPENSION ORAL at 17:03

## 2018-06-26 RX ADMIN — PANTOPRAZOLE SODIUM 40 MG: 40 INJECTION, POWDER, LYOPHILIZED, FOR SOLUTION INTRAVENOUS at 20:25

## 2018-06-26 RX ADMIN — PANTOPRAZOLE SODIUM 40 MG: 40 INJECTION, POWDER, LYOPHILIZED, FOR SOLUTION INTRAVENOUS at 11:21

## 2018-06-26 RX ADMIN — PROCHLORPERAZINE EDISYLATE 5 MG: 5 INJECTION INTRAMUSCULAR; INTRAVENOUS at 17:43

## 2018-06-26 RX ADMIN — GABAPENTIN 100 MG: 100 CAPSULE ORAL at 20:25

## 2018-06-26 RX ADMIN — ENOXAPARIN SODIUM 40 MG: 100 INJECTION SUBCUTANEOUS at 20:25

## 2018-06-26 RX ADMIN — SODIUM CHLORIDE, POTASSIUM CHLORIDE, SODIUM LACTATE AND CALCIUM CHLORIDE: 600; 310; 30; 20 INJECTION, SOLUTION INTRAVENOUS at 11:26

## 2018-06-26 RX ADMIN — SODIUM CHLORIDE, POTASSIUM CHLORIDE, SODIUM LACTATE AND CALCIUM CHLORIDE: 600; 310; 30; 20 INJECTION, SOLUTION INTRAVENOUS at 20:25

## 2018-06-26 RX ADMIN — INSULIN HUMAN 3 UNITS: 100 INJECTION, SOLUTION PARENTERAL at 23:15

## 2018-06-26 RX ADMIN — STANDARDIZED SENNA CONCENTRATE AND DOCUSATE SODIUM 2 TABLET: 8.6; 5 TABLET, FILM COATED ORAL at 20:25

## 2018-06-26 RX ADMIN — DIPHENHYDRAMINE HYDROCHLORIDE 50 MG: 50 INJECTION, SOLUTION INTRAMUSCULAR; INTRAVENOUS at 23:35

## 2018-06-26 RX ADMIN — MORPHINE SULFATE 3 MG: 4 INJECTION INTRAVENOUS at 17:41

## 2018-06-26 RX ADMIN — INSULIN HUMAN 3 UNITS: 100 INJECTION, SOLUTION PARENTERAL at 00:17

## 2018-06-26 RX ADMIN — OLANZAPINE 5 MG: 5 TABLET, FILM COATED ORAL at 20:25

## 2018-06-26 RX ADMIN — INSULIN GLARGINE 20 UNITS: 100 INJECTION, SOLUTION SUBCUTANEOUS at 20:25

## 2018-06-26 RX ADMIN — MORPHINE SULFATE 3 MG: 4 INJECTION INTRAVENOUS at 00:20

## 2018-06-26 RX ADMIN — SODIUM CHLORIDE, POTASSIUM CHLORIDE, SODIUM LACTATE AND CALCIUM CHLORIDE: 600; 310; 30; 20 INJECTION, SOLUTION INTRAVENOUS at 05:22

## 2018-06-26 RX ADMIN — SUCRALFATE 1 G: 1 SUSPENSION ORAL at 05:48

## 2018-06-26 RX ADMIN — MORPHINE SULFATE 3 MG: 4 INJECTION INTRAVENOUS at 23:26

## 2018-06-26 RX ADMIN — CHOLECALCIFEROL TAB 10 MCG (400 UNIT) 400 UNITS: 10 TAB at 11:23

## 2018-06-26 RX ADMIN — GABAPENTIN 100 MG: 100 CAPSULE ORAL at 11:20

## 2018-06-26 RX ADMIN — METOCLOPRAMIDE 10 MG: 5 INJECTION, SOLUTION INTRAMUSCULAR; INTRAVENOUS at 11:24

## 2018-06-26 RX ADMIN — METOCLOPRAMIDE HYDROCHLORIDE 10 MG: 10 TABLET ORAL at 23:15

## 2018-06-26 RX ADMIN — POTASSIUM CHLORIDE 40 MEQ: 1500 TABLET, EXTENDED RELEASE ORAL at 11:21

## 2018-06-26 RX ADMIN — SUCRALFATE 1 G: 1 SUSPENSION ORAL at 23:15

## 2018-06-26 RX ADMIN — ATORVASTATIN CALCIUM 20 MG: 20 TABLET, FILM COATED ORAL at 11:20

## 2018-06-26 RX ADMIN — PROCHLORPERAZINE EDISYLATE 5 MG: 5 INJECTION INTRAMUSCULAR; INTRAVENOUS at 06:13

## 2018-06-26 RX ADMIN — METOCLOPRAMIDE 10 MG: 5 INJECTION, SOLUTION INTRAMUSCULAR; INTRAVENOUS at 06:14

## 2018-06-26 ASSESSMENT — ENCOUNTER SYMPTOMS
POLYDIPSIA: 0
ABDOMINAL PAIN: 0
HALLUCINATIONS: 0
CHILLS: 0
DEPRESSION: 0
LOSS OF CONSCIOUSNESS: 0
SHORTNESS OF BREATH: 0
NERVOUS/ANXIOUS: 0
ORTHOPNEA: 0
SPUTUM PRODUCTION: 0
HEADACHES: 0
BLOOD IN STOOL: 0
SPEECH CHANGE: 0
CARDIOVASCULAR NEGATIVE: 1
FOCAL WEAKNESS: 0
BRUISES/BLEEDS EASILY: 0
NAUSEA: 0
VOMITING: 1
FEVER: 0
NEUROLOGICAL NEGATIVE: 1
COUGH: 0
VOMITING: 0
MYALGIAS: 0
NAUSEA: 1
CLAUDICATION: 0
DIZZINESS: 0
SENSORY CHANGE: 0
PSYCHIATRIC NEGATIVE: 1
BACK PAIN: 0
MUSCULOSKELETAL NEGATIVE: 1
NECK PAIN: 0
PHOTOPHOBIA: 0
RESPIRATORY NEGATIVE: 1
HEARTBURN: 0
PALPITATIONS: 0
SINUS PAIN: 0
HEMOPTYSIS: 0
BLURRED VISION: 0
CONSTIPATION: 0
DOUBLE VISION: 0
DIARRHEA: 0
EYE PAIN: 0
EYES NEGATIVE: 1
SEIZURES: 0
CONSTITUTIONAL NEGATIVE: 1

## 2018-06-26 ASSESSMENT — PAIN SCALES - GENERAL
PAINLEVEL_OUTOF10: 0
PAINLEVEL_OUTOF10: 6
PAINLEVEL_OUTOF10: 9
PAINLEVEL_OUTOF10: 0
PAINLEVEL_OUTOF10: 7
PAINLEVEL_OUTOF10: 3
PAINLEVEL_OUTOF10: 0
PAINLEVEL_OUTOF10: 9
PAINLEVEL_OUTOF10: 4
PAINLEVEL_OUTOF10: 5
PAINLEVEL_OUTOF10: 0
PAINLEVEL_OUTOF10: 8
PAINLEVEL_OUTOF10: 0
PAINLEVEL_OUTOF10: 6
PAINLEVEL_OUTOF10: 4

## 2018-06-26 NOTE — PROGRESS NOTES
Pulmonary Critical Care Progress Note      Date of service:  6/26/2018    Chief Complaint:  Diabetes    Interval Events:  24 hour interval history reviewed  Reason for visit:  Diabetes mellitus, gastroparesis       - feels better   - SR   - no N or V   - replete K   - did not need prn's for nausea      She feels better today.  She denies nausea or vomiting.  She denies abdominal pain.  She is eating.  She has no cough, sputum production or hemoptysis.  She denies dyspnea.  She has no angina, palpitations or syncope.      Review of Systems   Constitutional: Negative for fever and malaise/fatigue.   HENT: Negative for congestion, ear discharge, ear pain, nosebleeds, sinus pain and tinnitus.    Eyes: Negative for blurred vision, double vision, photophobia and pain.   Respiratory: Negative for cough, hemoptysis, sputum production and shortness of breath.    Cardiovascular: Negative for chest pain, palpitations, orthopnea and claudication.   Gastrointestinal: Negative for abdominal pain, constipation, diarrhea, nausea and vomiting.   Genitourinary: Negative for dysuria, frequency, hematuria and urgency.   Musculoskeletal: Negative for back pain, joint pain, myalgias and neck pain.   Skin: Negative for itching and rash.   Neurological: Negative for sensory change, speech change, focal weakness, seizures, loss of consciousness and headaches.   Endo/Heme/Allergies: Negative for environmental allergies and polydipsia. Does not bruise/bleed easily.   Psychiatric/Behavioral: Negative for depression, hallucinations and suicidal ideas. The patient is not nervous/anxious.        Physical Exam   Constitutional: She is oriented to person, place, and time. No distress.   HENT:   Head: Normocephalic and atraumatic.   Right Ear: External ear normal.   Left Ear: External ear normal.   Nose: Nose normal.   Mouth/Throat: No oropharyngeal exudate.   Eyes: Conjunctivae and EOM are normal. Pupils are equal, round, and reactive to light.  Right eye exhibits no discharge. Left eye exhibits no discharge. No scleral icterus.   Neck: Normal range of motion. Neck supple. No JVD present. No tracheal deviation present.   Cardiovascular: Intact distal pulses.  Exam reveals no gallop and no friction rub.    No murmur heard.  Sinus rhythm   Pulmonary/Chest: Effort normal and breath sounds normal. No stridor. No respiratory distress. She has no wheezes. She has no rales.   Abdominal: Soft. Bowel sounds are normal. She exhibits no distension. There is no tenderness. There is no guarding.   Musculoskeletal: Normal range of motion. She exhibits no edema, tenderness or deformity.   No clubbing or cyanosis   Neurological: She is alert and oriented to person, place, and time. No cranial nerve deficit. GCS score is 15.   No focal weakness   Skin: Skin is warm and dry. No rash noted. She is not diaphoretic. No erythema. No pallor.   Psychiatric: Memory normal.       PFSH:  No change.    Respiratory:     Pulse Oximetry: 97 %  CXR images personally reviewed and compared to prior images  CXR with clear lungs    HemoDynamics:  Pulse: 74, Heart Rate (Monitored): 74  Blood Pressure: 136/94, NIBP: 101/64       Neuro:    Fluids:  Intake/Output       06/24/18 0700 - 06/25/18 0659 06/25/18 0700 - 06/26/18 0659 06/26/18 0700 - 06/27/18 0659      2135-7484 9050-3199 Total 9869-2882 6700-9331 Total 4272-8390 5538-6837 Total       Intake    P.O.  --  -- --  --  480 480  --  -- --    P.O. -- -- -- -- 480 480 -- -- --    I.V.  1524  1555.2 3079.2  1500  1250 2750  --  -- --    Insulin Volume 24 55.2 79.2 -- -- -- -- -- --    D10%0.45%NS 7008 146 2410 -- -- -- -- -- --    IV Volume (D5 LR) -- 250 250 -- -- -- -- -- --    IV Volume (LR) --  1250 2750 -- -- --    Total Intake 1524 1555.2 3079.2 1500 1730 3230 -- -- --       Output    Urine  2300  750 3050  225  1100 1325  --  -- --    Number of Times Voided -- -- -- -- 1 x 1 x -- -- --    Urine Void (mL) (non-catheter) 2300  750 3050 225 1100 1325 -- -- --    Stool  --  -- --  --  -- --  --  -- --    Number of Times Stooled -- -- -- -- 0 x 0 x -- -- --    Total Output 2300 750 3050 225 1100 1325 -- -- --       Net I/O     -776 805.2 29.2 9089 646 5053 -- -- --        Weight: 79.9 kg (176 lb 2.4 oz)  Recent Labs      18   0727   18   1821   18   0215   18   1740  18   0745   SODIUM  131*   < >  134*   < >  135   < >  136  134*  137   POTASSIUM  4.5   < >  3.5*   < >  3.4*   < >  3.7  3.6  3.8   CHLORIDE  108   < >  114*   < >  113*   < >  116*  115*  115*   CO2  5*   < >  10*   < >  13*   < >  13*  15*  15*   BUN  5*   < >  3*   < >  4*   < >  3*  3*  3*   CREATININE  0.53   < >  0.58   < >  0.45*   < >  0.59  0.54  0.51   MAGNESIUM  1.6   --   1.6   --   1.4*   --    --    --    --    PHOSPHORUS  2.6   --   1.5*   --   2.7   --    --    --    --    CALCIUM  8.6   < >  8.7   < >  8.4*   < >  8.2*  8.1*  8.6    < > = values in this interval not displayed.       GI/Nutrition:    Liver Function  Recent Labs      18   1821   18   0215   18   1740  18   0745   ALTSGPT  13   --   11   --    --    --    --    ASTSGOT  11*   --   10*   --    --    --    --    ALKPHOSPHAT  97   --   85   --    --    --    --    TBILIRUBIN  0.5   --   0.6   --    --    --    --    GLUCOSE  133*   < >  117*   < >  134*  105*  135*    < > = values in this interval not displayed.       Heme:  Recent Labs      18   0605   RBC  4.00*   HEMOGLOBIN  11.6*   HEMATOCRIT  33.7*   PLATELETCT  199       Infectious Disease:  Temp  Av.7 °C (98 °F)  Min: 36.3 °C (97.4 °F)  Max: 36.9 °C (98.4 °F)    Recent Labs      18   1821  18   0215  18   0605   WBC   --    --   5.3   NEUTSPOLYS   --    --   43.20*   LYMPHOCYTES   --    --   44.10*   MONOCYTES   --    --   8.70   EOSINOPHILS   --    --   3.00   BASOPHILS   --    --   0.60   ASTSGOT  11*  10*   --    ALTSGPT  13   11   --    ALKPHOSPHAT  97  85   --    TBILIRUBIN  0.5  0.6   --      Current Facility-Administered Medications   Medication Dose Frequency Provider Last Rate Last Dose   • insulin regular (HUMULIN R) injection 3-14 Units  3-14 Units Q6HRS Cholo Alan Jr., D.O.   4 Units at 06/26/18 0548    And   • glucose 4 g chewable tablet 16 g  16 g Q15 MIN PRN Cholo Alan Jr., D.O.        And   • dextrose 50% (D50W) injection 25 mL  25 mL Q15 MIN PRN Cholo Alan Jr. D.O.       • insulin glargine (LANTUS) injection 20 Units  20 Units Q EVENING Cholo Alan Jr. D.O.   20 Units at 06/25/18 2026   • lactated ringers infusion   Continuous Cholo Alan Jr. D.O. 125 mL/hr at 06/25/18 2007     • LORazepam (ATIVAN) injection 1 mg  1 mg 4XDAY Lior Child M.D.   Stopped at 06/25/18 0630   • diphenhydrAMINE (BENADRYL) injection 50 mg  50 mg Q6HRS Lior Child M.D.   Stopped at 06/25/18 0600   • OLANZapine (ZYPREXA) tablet 5 mg  5 mg Q EVENING Arnulfo Gavin D.O.   5 mg at 06/25/18 2000   • morphine (pf) 4 mg/ml injection 3 mg  3 mg Q4HRS PRN Arnulfo Gavin D.O.   3 mg at 06/26/18 0020   • metoclopramide (REGLAN) injection 10 mg  10 mg 4X/DAY ACHS Arnulfo Gavin D.O.   10 mg at 06/25/18 2000   • prochlorperazine (COMPAZINE) injection 5 mg  5 mg Q8HRS Pa Urbina M.D.   5 mg at 06/25/18 2346   • pantoprazole (PROTONIX) injection 40 mg  40 mg BID Pa Urbina M.D.   40 mg at 06/25/18 2000   • glucose 4 g chewable tablet 16 g  16 g Q15 MIN PRN Pa Urbina M.D.        And   • dextrose 50% (D50W) injection 25 mL  25 mL Q15 MIN PRN Pa Urbina M.D.   25 mL at 06/19/18 1646   • enoxaparin (LOVENOX) inj 40 mg  40 mg QHS Pa Urbina M.D.   40 mg at 06/25/18 2000   • senna-docusate (PERICOLACE or SENOKOT S) 8.6-50 MG per tablet 2 Tab  2 Tab BID Shara Hurd M.D.   2 Tab at 06/25/18 2000    And   • polyethylene glycol/lytes (MIRALAX) PACKET 1 Packet  1 Packet QDAY PRN Shara Hurd  M.D.        And   • magnesium hydroxide (MILK OF MAGNESIA) suspension 30 mL  30 mL QDAY PRN Shara Hurd M.D.        And   • bisacodyl (DULCOLAX) suppository 10 mg  10 mg QDAY PRN Shara Hurd M.D.       • labetalol (NORMODYNE,TRANDATE) injection 10 mg  10 mg Q4HRS PRN Shara Hurd M.D.       • ondansetron (ZOFRAN) syringe/vial injection 4 mg  4 mg Q4HRS PRN Shara Hurd M.D.   4 mg at 06/23/18 1554   • ondansetron (ZOFRAN ODT) dispertab 4 mg  4 mg Q4HRS PRN Shara Hurd M.D.       • prochlorperazine (COMPAZINE) injection 5-10 mg  5-10 mg Q4HRS PRN Shara Hurd M.D.   10 mg at 06/22/18 0101   • acetaminophen (TYLENOL) tablet 650 mg  650 mg Q6HRS PRN Shara Hurd M.D.   650 mg at 06/25/18 2000   • atorvastatin (LIPITOR) tablet 20 mg  20 mg DAILY Shara Hurd M.D.   20 mg at 06/22/18 0852   • cholecalciferol (VITAMIN D3) tablet 400 Units  400 Units DAILY Shara Hurd M.D.   400 Units at 06/22/18 0852   • gabapentin (NEURONTIN) capsule 100 mg  100 mg BID Shara Hurd M.D.   100 mg at 06/25/18 2000   • sucralfate (CARAFATE) 1 GM/10ML suspension 1 g  1 g Q6HRS Shara Hurd M.D.   1 g at 06/26/18 0548     Last reviewed on 6/13/2018  8:14 AM by Blade Darden    Quality  Measures:   Labs reviewed, Medications reviewed and Radiology images reviewed   Pitt catheter: No Pitt   Central line in place: Need for access     DVT Prophylaxis: Enoxaparin (Lovenox)   DVT prophylaxis - mechanical: SCDs   Ulcer prophylaxis: Not indicated          Assessment and Plan:    Recurrent acute diabetic ketoacidosis   -Resolved    DM type I   -Continue current dose of Lantus and sliding scale insulin   -Diabetic diet    Severe gastroparesis   -S/P EGD and pyloric dilation for possible gastric outlet obstruction vs cyclical vomiting   -Continue current dose of Benadryl, lorazepam, Reglan and Compazine    Constipation   -Continue bowel protocol      OK to transfer out of  ICU.  [facility] will sign off on transfer.  Please call if you have any questions.    Discussed with RN, RT, team, clinical pharmacist, hospitalist

## 2018-06-26 NOTE — CARE PLAN
Problem: Safety  Goal: Will remain free from injury  Safety precautions in place    Problem: Pain Management  Goal: Pain level will decrease to patient's comfort goal  Medicated as needed for pain

## 2018-06-26 NOTE — PROGRESS NOTES
[facility] Progress Note    Date of Service: 2018    Chief Complaint  27 y.o. female admitted 2018 with nausea and vommiting    PMHx of TIDM poorly controled and diabetic gastroparesis.  Had an EGD in  showing erosive esophagitis and gastritis with pyloric stenosis which was dialated.  Sent home on PPI and carafate.  Re-admitted on 18 with DKA     Interval Problem Update  Tolerating full liquid diet, she's not had any episodes of emesis  Denies significant nausea, she has a bit of heartburn  Patient is on scheduled Reglan, she's been using Ativan and Benadryl only as needed  Blood pressure stable, she gets tachycardic to the 100's, no palpitations  FSBG's 126-345    Consultants/Specialty  GI  Critical Care, I discussed the patient's condition with Dr. Gayle this morning on ICU Rounds    Disposition  Okay to transfer out of the ICU      Review of Systems   Constitutional: Positive for malaise/fatigue. Negative for chills and fever.   Respiratory: Negative for cough and shortness of breath.    Cardiovascular: Negative for chest pain.   Gastrointestinal: Positive for nausea and vomiting. Negative for abdominal pain and diarrhea.   Musculoskeletal: Negative for back pain.   Skin: Negative for rash.   Neurological: Negative for dizziness, loss of consciousness and headaches.      Physical Exam  Laboratory/Imaging   Hemodynamics  Temp (24hrs), Av.7 °C (98 °F), Min:36.3 °C (97.4 °F), Max:36.9 °C (98.4 °F)   Temperature: 36.6 °C (97.9 °F)  Pulse  Av.6  Min: 60  Max: 166 Heart Rate (Monitored): 78  Blood Pressure: 136/94, NIBP: 120/80      Respiratory      Respiration: 18, Pulse Oximetry: 96 %        RUL Breath Sounds: Clear, RML Breath Sounds: Clear;Diminished, RLL Breath Sounds: Diminished, NANCY Breath Sounds: Clear, LLL Breath Sounds: Diminished    Fluids    Intake/Output Summary (Last 24 hours) at 18 0812  Last data filed at 18 0600   Gross per 24 hour   Intake              3350 ml   Output             1100 ml   Net             2250 ml       Nutrition  Orders Placed This Encounter   Procedures   • Diet Order Diabetic, Full Liquid     Standing Status:   Standing     Number of Occurrences:   1     Order Specific Question:   Diet:     Answer:   Diabetic [3]     Order Specific Question:   Diet:     Answer:   Full Liquid [11]     Physical Exam   Constitutional: She is oriented to person, place, and time. She appears well-developed and well-nourished. No distress.   HENT:   Head: Normocephalic and atraumatic.   Eyes: No scleral icterus.   Neck: No JVD present.   Cardiovascular: Normal rate and regular rhythm.    Pulmonary/Chest: Effort normal. No stridor. No respiratory distress. She has no wheezes. She has no rales.   Abdominal: Soft. She exhibits no distension. There is tenderness. There is no rebound and no guarding.   Musculoskeletal: She exhibits no edema.   Neurological: She is oriented to person, place, and time.   Skin: Skin is warm and dry. No rash noted. She is not diaphoretic.   Psychiatric: She has a normal mood and affect. Thought content normal.   Nursing note and vitals reviewed.      Recent Labs      06/24/18   0605  06/26/18   0545   WBC  5.3  3.8*   RBC  4.00*  3.40*   HEMOGLOBIN  11.6*  9.9*   HEMATOCRIT  33.7*  29.0*   MCV  84.3  85.3   MCH  29.0  29.1   MCHC  34.4  34.1   RDW  39.4  39.3   PLATELETCT  199  145*   MPV  9.7  10.2     Recent Labs      06/24/18   2205  06/25/18   0745  06/26/18   0545   SODIUM  134*  137  139   POTASSIUM  3.6  3.8  3.6   CHLORIDE  115*  115*  111   CO2  15*  15*  21   GLUCOSE  105*  135*  227*   BUN  3*  3*  3*   CREATININE  0.54  0.51  0.50   CALCIUM  8.1*  8.6  7.9*             Recent Labs      06/23/18   1821   TRIGLYCERIDE  89          Assessment/Plan     * Diabetic ketoacidosis (HCC)- (present on admission)   Assessment & Plan    Recurrent admissions with diabetic ketoacidosis  Lantus and sliding scale  Diabetes remains  uncontrolled with blood sugars above 300 today  Increase Lantus        Intractable nausea and vomiting- (present on admission)   Assessment & Plan    Patient by mouth Reglan  Continue PPI and Carafate          Gastroparesis due to DM (HCC)- (present on admission)   Assessment & Plan    Presumed gastroparesis flare  Appreciate GI consultation and recommendations  Patient has improved with scheduled Reglan  She was not taking the Benadryl or Ativan scheduled fashion, I changed her to PRN dosing today  Changed to by mouth Reglan  Encourage compliance with recommended diet          Esophagitis- (present on admission)   Assessment & Plan    And gastritis as well as pyloric stenosis seen on EGD done on 6/9/18.  Continue Carafate and IV Protonix        Type 1 diabetes mellitus (HCC)- (present on admission)   Assessment & Plan    Previously HbA1c 8.8.          Hypomagnesemia   Assessment & Plan    Magnesium has been replentished.         Hypokalemia- (present on admission)   Assessment & Plan    Repleted        Pyloric stenosis   Assessment & Plan    Status post dilatation on 6/9.   GI following           Quality-Core Measures   Reviewed items::  EKG reviewed, Labs reviewed, Medications reviewed and Radiology images reviewed  Pitt catheter::  No Pitt  DVT prophylaxis pharmacological::  Enoxaparin (Lovenox)  DVT prophylaxis - mechanical:  SCDs  Ulcer Prophylaxis::  Yes

## 2018-06-26 NOTE — CARE PLAN
Assessment/Plan


Problems:  


(1) Acute necrotizing viral encephalitis


(2) Encephalopathy acute


(3) Sepsis


(4) Parkinson disease


(5) CVA (cerebral vascular accident)


(6) Altered level of consciousness


Assessment/Plan


on Aztrreonam, Vancomycin, Acyclovir, Bactrim IV


low grade fever


CSF noted, wbc and protein  elevated,


all cultures pending or negative


spinal tab for today


West Nile serology pending


NG tube in place'


check electrolytes


dvt prophylaxis.





Subjective


ROS Limited/Unobtainable:  Yes


Allergies:  


Coded Allergies:  


     PENICILLINS (Verified  Allergy, Unknown, 6/22/18)





Objective





Last 24 Hour Vital Signs








  Date Time  Temp Pulse Resp B/P (MAP) Pulse Ox O2 Delivery O2 Flow Rate FiO2


 


6/26/18 09:22 100.5       


 


6/26/18 08:52 100.8       


 


6/26/18 06:30      Nasal Cannula 2.0 28


 


6/26/18 06:30  84 20   Nasal Cannula 2.0 28


 


6/26/18 06:30     95 Nasal Cannula 2.0 28


 


6/26/18 04:00  78      


 


6/26/18 04:00 98.8 79 22 133/61 95 Venturi Mask 30.0 





 98.8       


 


6/26/18 00:00  90      


 


6/26/18 00:00 98.3 77 24 103/57 96 Venturi Mask 30.0 





 98.3       


 


6/25/18 20:08     96 Nasal Cannula 2.0 28


 


6/25/18 20:08      Nasal Cannula 2.0 28


 


6/25/18 20:06  86 22   Nasal Cannula 2.0 28


 


6/25/18 20:00 98.5 78 24 100/50 97 Nasal Cannula 2.0 





 98.5       


 


6/25/18 20:00  84      


 


6/25/18 16:00 99.8 95 26 130/67 95 Nasal Cannula 2.0 





 99.8       


 


6/25/18 16:00  88      


 


6/25/18 12:00 98.5 87 16 133/63 98 Nasal Cannula 2.0 





 98.5       


 


6/25/18 12:00  78      

















Intake and Output  


 


 6/25/18 6/26/18





 19:00 07:00


 


Intake Total 285 ml 1780 ml


 


Output Total 1000 ml 1500 ml


 


Balance -715 ml 280 ml


 


  


 


IV Total 285 ml 1780 ml


 


Output Urine Total 1000 ml 1500 ml


 


# Bowel Movements  2








General Appearance:  cachetic


HEENT:  normocephalic, atraumatic


Respiratory/Chest:  chest wall non-tender, rhonchi


Cardiovascular:  normal peripheral pulses, normal rate


Abdomen:  normal bowel sounds, soft, non tender


Skin:  no rash, no lesions


Neurologic/Psychiatric:  unresponsiveness





Microbiology








 Date/Time


Source Procedure


Growth Status


 


 


 6/23/18 15:30


Urine,Clean Catch Urine Culture - Preliminary


NO GROWTH AFTER 24 HOURS Resulted








Laboratory Tests


6/25/18 17:45: 


CSF Appearance Clear, CSF Color Colorless, CSF WBC 14*H, CSF , CSF 

Neutrophils % 84, CSF Lymphocytes % 12, CSF Monocytes % 4, CSF Crenated Cells 0

, CSF Glucose 63, CSF Total Protein 52H, CSF VDRL [Pending], CSF Herpes Simplex 

II DNA (PCR) [Pending], Cryptococcus Antigen [Pending], West Nile Virus IgG 

Antibody [Pending], West Nile Virus IgM Antibody [Pending], Herpes Simplex 

Virus I DNA (PCR) [Pending]


6/26/18 05:31: 


White Blood Count 6.0, Red Blood Count 3.41L, Hemoglobin 10.3L, Hematocrit 29.6L

, Mean Corpuscular Volume 87, Mean Corpuscular Hemoglobin 30.2, Mean 

Corpuscular Hemoglobin Concent 34.8, Red Cell Distribution Width 13.1, Platelet 

Count 184, Mean Platelet Volume 6.1L, Neutrophils (%) (Auto) 74.2, Lymphocytes (

%) (Auto) 12.5L, Monocytes (%) (Auto) 11.7H, Eosinophils (%) (Auto) 1.0, 

Basophils (%) (Auto) 0.6, Sodium Level 134L, Potassium Level 3.6, Chloride 

Level 103, Carbon Dioxide Level 25, Anion Gap 6, Blood Urea Nitrogen 9, 

Creatinine 0.9, Estimat Glomerular Filtration Rate , Glucose Level 121H, 

Calcium Level 7.9L, Total Bilirubin 0.6, Aspartate Amino Transf (AST/SGOT) 26, 

Alanine Aminotransferase (ALT/SGPT) 25, Alkaline Phosphatase 63, Pro-B-Type 

Natriuretic Peptide 1548H, Total Protein 5.4L, Albumin 2.2L, Globulin 3.2, 

Albumin/Globulin Ratio 0.7L, Rapid Plasma Reagin [Pending], Treponema pallidum 

Ab (FTA-ABS) [Pending], HIV-1 RNA (PCR) log10 Value [Pending], HIV-1 RNA 

Ultraquantitative (PCR) [Pending], HIV (1&2) Antibody Rapid Negative





Current Medications








 Medications


  (Trade)  Dose


 Ordered  Sig/Maninder


 Route


 PRN Reason  Start Time


 Stop Time Status Last Admin


Dose Admin


 


 Acetaminophen


  (Tylenol)  650 mg  Q4H  PRN


 RECTAL


 Fever/Headache/Mild Pain  6/24/18 05:58


 7/23/18 05:57  6/26/18 08:52


 


 


 Acyclovir 750 mg/


 Sodium Chloride  110 ml @ 


 110 mls/hr  Q8H


 IV


   6/24/18 07:00


 7/23/18 14:59  6/26/18 07:09


 


 


 Al Hydroxide/Mg


 Hydroxide


  (Mylanta II)  30 ml  Q6H  PRN


 ORAL


 dyspepsia  6/24/18 08:15


 7/22/18 20:14   


 


 


 Albuterol/


 Ipratropium


  (Albuterol/


 Ipratropium)  3 ml  Q4H  PRN


 HHN


 Shortness of Breath  6/24/18 08:15


 6/27/18 20:14   


 


 


 Aztreonam 1 gm/


 Sodium Chloride  50 ml @ 


 100 mls/hr  EVERY 8  HOURS


 IVPB


   6/25/18 06:00


 6/30/18 05:59  6/26/18 06:09


 


 


 Carbidopa/Levodopa


  (Sinemet 25/100)  1 tab  THREE TIMES A  DAY


 ORAL


   6/24/18 09:00


 7/23/18 12:59  6/26/18 08:32


 


 


 Dextrose/


 Electrolytes  1,000 ml @ 


 75 mls/hr  [id number]


 IV


   6/24/18 09:30


 7/24/18 09:29  6/26/18 02:34


 


 


 Finasteride


  (Proscar)  5 mg  DAILY


 ORAL


   6/24/18 09:00


 7/23/18 12:59  6/26/18 08:32


 


 


 Heparin Sodium


  (Porcine)


  (Heparin 5000


 units/ml)  5,000 units  EVERY 12  HOURS


 SUBQ


   6/24/18 09:00


 7/22/18 20:59  6/26/18 08:37


 


 


 Levetiracetam 750


 mg/Dextrose  117.5 ml @ 


 470 mls/hr  Q12HR


 IV


   6/26/18 09:00


 7/26/18 08:59  6/26/18 09:49


 


 


 Nitroglycerin


  (Ntg)  0.4 mg  Q5M  PRN


 SL


 Prn Chest Pain  6/24/18 06:00


 7/22/18 20:14   


 


 


 Ondansetron HCl


  (Zofran)  4 mg  Q6H  PRN


 IVP


 Nausea & Vomiting  6/24/18 08:15


 7/22/18 20:14   


 


 


 Pantoprazole


  (Protonix)  40 mg  EVERY 12  HOURS


 IVP


   6/24/18 09:00


 7/23/18 20:59  6/26/18 08:32


 


 


 Polyethylene


 Glycol


  (Miralax)  17 gm  DAILYPRN  PRN


 ORAL


 Constipation  6/24/18 06:02


 7/22/18 06:01   


 


 


 Promethazine HCl/


 Codeine


  (Phenergan with


 Codeine)  5 ml  Q4H  PRN


 ORAL


 For Cough  6/24/18 08:15


 7/22/18 20:14   


 


 


 Quetiapine


 Fumarate


  (SEROquel)  12.5 mg  Q4H  PRN


 ORAL


 Agitation  6/25/18 12:15


 7/25/18 12:14   


 


 


 Temazepam


  (Restoril)  15 mg  HSPRN  PRN


 ORAL


 Insomnia  6/24/18 20:15


 6/29/18 20:14   


 


 


 Trimethoprim/


 Sulfamethoxazole


 10 ml/Dextrose  285 ml @ 


 190 mls/hr  Q6H


 IV


   6/24/18 11:00


 6/30/18 16:59  6/26/18 04:51


 


 


 Vancomycin HCl


  (Vanco rx to


 dose)  1 ea  DAILY  PRN


 MISC


 Per rx protocol  6/24/18 09:00


 7/22/18 20:14   


 


 


 Vancomycin HCl/


 Dextrose  250 ml @ 


 166.667


 mls/hr  Q24H


 IVPB


   6/25/18 12:00


 6/30/18 11:59  6/25/18 11:10


 

















Carline Stevens MD Jun 26, 2018 09:55 Problem: Pain Management  Goal: Pain level will decrease to patient’s comfort goal  Outcome: PROGRESSING AS EXPECTED  Pt denies any pain.     Problem: Mobility  Goal: Risk for activity intolerance will decrease  Outcome: PROGRESSING AS EXPECTED  Pt up to self, steady on feet.

## 2018-06-26 NOTE — CARE PLAN
Problem: Communication  Goal: The ability to communicate needs accurately and effectively will improve  Outcome: PROGRESSING AS EXPECTED  Pt oriented to environment. Call bell within reach. Needs addressed, plan of care reviewed.     Problem: Safety  Goal: Will remain free from falls  Outcome: PROGRESSING AS EXPECTED  No falls this shift. Pt educated on fall risk. Pt educated to call before attempting to mobilize. Fall precautions in place. Bed exit alarm engaged.     Problem: Bowel/Gastric:  Goal: Normal bowel function is maintained or improved  Outcome: PROGRESSING SLOWER THAN EXPECTED      Problem: Pain Management  Goal: Pain level will decrease to patient's comfort goal  Outcome: PROGRESSING SLOWER THAN EXPECTED

## 2018-06-27 LAB
ANION GAP SERPL CALC-SCNC: 8 MMOL/L (ref 0–11.9)
BASOPHILS # BLD AUTO: 0.4 % (ref 0–1.8)
BASOPHILS # BLD: 0.02 K/UL (ref 0–0.12)
BUN SERPL-MCNC: 4 MG/DL (ref 8–22)
CALCIUM SERPL-MCNC: 8.2 MG/DL (ref 8.5–10.5)
CHLORIDE SERPL-SCNC: 110 MMOL/L (ref 96–112)
CO2 SERPL-SCNC: 25 MMOL/L (ref 20–33)
CREAT SERPL-MCNC: 0.37 MG/DL (ref 0.5–1.4)
EOSINOPHIL # BLD AUTO: 0.18 K/UL (ref 0–0.51)
EOSINOPHIL NFR BLD: 3.6 % (ref 0–6.9)
ERYTHROCYTE [DISTWIDTH] IN BLOOD BY AUTOMATED COUNT: 38 FL (ref 35.9–50)
GLUCOSE BLD-MCNC: 114 MG/DL (ref 65–99)
GLUCOSE BLD-MCNC: 211 MG/DL (ref 65–99)
GLUCOSE BLD-MCNC: 214 MG/DL (ref 65–99)
GLUCOSE BLD-MCNC: 236 MG/DL (ref 65–99)
GLUCOSE BLD-MCNC: 263 MG/DL (ref 65–99)
GLUCOSE BLD-MCNC: 55 MG/DL (ref 65–99)
GLUCOSE BLD-MCNC: 88 MG/DL (ref 65–99)
GLUCOSE SERPL-MCNC: 58 MG/DL (ref 65–99)
HCT VFR BLD AUTO: 33 % (ref 37–47)
HGB BLD-MCNC: 11.1 G/DL (ref 12–16)
IMM GRANULOCYTES # BLD AUTO: 0.01 K/UL (ref 0–0.11)
IMM GRANULOCYTES NFR BLD AUTO: 0.2 % (ref 0–0.9)
LYMPHOCYTES # BLD AUTO: 3.23 K/UL (ref 1–4.8)
LYMPHOCYTES NFR BLD: 64.6 % (ref 22–41)
MCH RBC QN AUTO: 28.2 PG (ref 27–33)
MCHC RBC AUTO-ENTMCNC: 33.6 G/DL (ref 33.6–35)
MCV RBC AUTO: 84 FL (ref 81.4–97.8)
MONOCYTES # BLD AUTO: 0.37 K/UL (ref 0–0.85)
MONOCYTES NFR BLD AUTO: 7.4 % (ref 0–13.4)
NEUTROPHILS # BLD AUTO: 1.19 K/UL (ref 2–7.15)
NEUTROPHILS NFR BLD: 23.8 % (ref 44–72)
NRBC # BLD AUTO: 0 K/UL
NRBC BLD-RTO: 0 /100 WBC
PLATELET # BLD AUTO: 166 K/UL (ref 164–446)
PMV BLD AUTO: 10.7 FL (ref 9–12.9)
POTASSIUM SERPL-SCNC: 3 MMOL/L (ref 3.6–5.5)
RBC # BLD AUTO: 3.93 M/UL (ref 4.2–5.4)
SODIUM SERPL-SCNC: 143 MMOL/L (ref 135–145)
WBC # BLD AUTO: 5 K/UL (ref 4.8–10.8)

## 2018-06-27 PROCEDURE — 700111 HCHG RX REV CODE 636 W/ 250 OVERRIDE (IP): Performed by: HOSPITALIST

## 2018-06-27 PROCEDURE — 700102 HCHG RX REV CODE 250 W/ 637 OVERRIDE(OP): Performed by: INTERNAL MEDICINE

## 2018-06-27 PROCEDURE — 99233 SBSQ HOSP IP/OBS HIGH 50: CPT | Performed by: INTERNAL MEDICINE

## 2018-06-27 PROCEDURE — A9270 NON-COVERED ITEM OR SERVICE: HCPCS | Performed by: INTERNAL MEDICINE

## 2018-06-27 PROCEDURE — 700102 HCHG RX REV CODE 250 W/ 637 OVERRIDE(OP): Performed by: HOSPITALIST

## 2018-06-27 PROCEDURE — 770006 HCHG ROOM/CARE - MED/SURG/GYN SEMI*

## 2018-06-27 PROCEDURE — A9270 NON-COVERED ITEM OR SERVICE: HCPCS | Performed by: FAMILY MEDICINE

## 2018-06-27 PROCEDURE — 82962 GLUCOSE BLOOD TEST: CPT

## 2018-06-27 PROCEDURE — 700102 HCHG RX REV CODE 250 W/ 637 OVERRIDE(OP): Performed by: FAMILY MEDICINE

## 2018-06-27 PROCEDURE — 700111 HCHG RX REV CODE 636 W/ 250 OVERRIDE (IP): Performed by: INTERNAL MEDICINE

## 2018-06-27 PROCEDURE — 85025 COMPLETE CBC W/AUTO DIFF WBC: CPT

## 2018-06-27 PROCEDURE — 700105 HCHG RX REV CODE 258: Performed by: INTERNAL MEDICINE

## 2018-06-27 PROCEDURE — 80048 BASIC METABOLIC PNL TOTAL CA: CPT

## 2018-06-27 PROCEDURE — A9270 NON-COVERED ITEM OR SERVICE: HCPCS | Performed by: HOSPITALIST

## 2018-06-27 RX ORDER — OMEPRAZOLE 20 MG/1
20 CAPSULE, DELAYED RELEASE ORAL 2 TIMES DAILY
Status: DISCONTINUED | OUTPATIENT
Start: 2018-06-27 | End: 2018-06-29 | Stop reason: HOSPADM

## 2018-06-27 RX ORDER — OXYCODONE HYDROCHLORIDE 5 MG/1
5 TABLET ORAL EVERY 4 HOURS PRN
Status: DISCONTINUED | OUTPATIENT
Start: 2018-06-27 | End: 2018-06-29 | Stop reason: HOSPADM

## 2018-06-27 RX ORDER — POTASSIUM CHLORIDE 20 MEQ/1
20 TABLET, EXTENDED RELEASE ORAL 2 TIMES DAILY
Status: DISCONTINUED | OUTPATIENT
Start: 2018-06-27 | End: 2018-06-29 | Stop reason: HOSPADM

## 2018-06-27 RX ORDER — OXYCODONE HYDROCHLORIDE 10 MG/1
10 TABLET ORAL EVERY 4 HOURS PRN
Status: DISCONTINUED | OUTPATIENT
Start: 2018-06-27 | End: 2018-06-29 | Stop reason: HOSPADM

## 2018-06-27 RX ORDER — INSULIN GLARGINE 100 [IU]/ML
15 INJECTION, SOLUTION SUBCUTANEOUS 2 TIMES DAILY
Status: DISCONTINUED | OUTPATIENT
Start: 2018-06-27 | End: 2018-06-29 | Stop reason: HOSPADM

## 2018-06-27 RX ADMIN — OLANZAPINE 5 MG: 5 TABLET, FILM COATED ORAL at 20:14

## 2018-06-27 RX ADMIN — GABAPENTIN 100 MG: 100 CAPSULE ORAL at 08:58

## 2018-06-27 RX ADMIN — GABAPENTIN 100 MG: 100 CAPSULE ORAL at 20:14

## 2018-06-27 RX ADMIN — METOCLOPRAMIDE HYDROCHLORIDE 10 MG: 10 TABLET ORAL at 05:08

## 2018-06-27 RX ADMIN — INSULIN HUMAN 7 UNITS: 100 INJECTION, SOLUTION PARENTERAL at 12:18

## 2018-06-27 RX ADMIN — POTASSIUM CHLORIDE 20 MEQ: 1500 TABLET, EXTENDED RELEASE ORAL at 10:48

## 2018-06-27 RX ADMIN — SODIUM CHLORIDE, POTASSIUM CHLORIDE, SODIUM LACTATE AND CALCIUM CHLORIDE: 600; 310; 30; 20 INJECTION, SOLUTION INTRAVENOUS at 20:14

## 2018-06-27 RX ADMIN — MORPHINE SULFATE 3 MG: 4 INJECTION INTRAVENOUS at 18:50

## 2018-06-27 RX ADMIN — ENOXAPARIN SODIUM 40 MG: 100 INJECTION SUBCUTANEOUS at 20:14

## 2018-06-27 RX ADMIN — OMEPRAZOLE 20 MG: 20 CAPSULE, DELAYED RELEASE ORAL at 20:14

## 2018-06-27 RX ADMIN — POTASSIUM CHLORIDE 20 MEQ: 1500 TABLET, EXTENDED RELEASE ORAL at 20:14

## 2018-06-27 RX ADMIN — MORPHINE SULFATE 3 MG: 4 INJECTION INTRAVENOUS at 09:08

## 2018-06-27 RX ADMIN — SODIUM CHLORIDE, POTASSIUM CHLORIDE, SODIUM LACTATE AND CALCIUM CHLORIDE: 600; 310; 30; 20 INJECTION, SOLUTION INTRAVENOUS at 04:20

## 2018-06-27 RX ADMIN — MORPHINE SULFATE 3 MG: 4 INJECTION INTRAVENOUS at 13:46

## 2018-06-27 RX ADMIN — METOCLOPRAMIDE HYDROCHLORIDE 10 MG: 10 TABLET ORAL at 17:44

## 2018-06-27 RX ADMIN — SUCRALFATE 1 G: 1 SUSPENSION ORAL at 12:14

## 2018-06-27 RX ADMIN — SODIUM CHLORIDE, POTASSIUM CHLORIDE, SODIUM LACTATE AND CALCIUM CHLORIDE: 600; 310; 30; 20 INJECTION, SOLUTION INTRAVENOUS at 12:14

## 2018-06-27 RX ADMIN — SUCRALFATE 1 G: 1 SUSPENSION ORAL at 17:44

## 2018-06-27 RX ADMIN — SUCRALFATE 1 G: 1 SUSPENSION ORAL at 23:06

## 2018-06-27 RX ADMIN — METOCLOPRAMIDE HYDROCHLORIDE 10 MG: 10 TABLET ORAL at 23:06

## 2018-06-27 RX ADMIN — SUCRALFATE 1 G: 1 SUSPENSION ORAL at 05:08

## 2018-06-27 RX ADMIN — OMEPRAZOLE 20 MG: 20 CAPSULE, DELAYED RELEASE ORAL at 08:58

## 2018-06-27 RX ADMIN — ATORVASTATIN CALCIUM 20 MG: 20 TABLET, FILM COATED ORAL at 08:58

## 2018-06-27 RX ADMIN — INSULIN GLARGINE 15 UNITS: 100 INJECTION, SOLUTION SUBCUTANEOUS at 23:08

## 2018-06-27 RX ADMIN — STANDARDIZED SENNA CONCENTRATE AND DOCUSATE SODIUM 2 TABLET: 8.6; 5 TABLET, FILM COATED ORAL at 20:14

## 2018-06-27 RX ADMIN — CHOLECALCIFEROL TAB 10 MCG (400 UNIT) 400 UNITS: 10 TAB at 08:58

## 2018-06-27 RX ADMIN — METOCLOPRAMIDE HYDROCHLORIDE 10 MG: 10 TABLET ORAL at 12:14

## 2018-06-27 RX ADMIN — INSULIN HUMAN 4 UNITS: 100 INJECTION, SOLUTION PARENTERAL at 17:47

## 2018-06-27 ASSESSMENT — COGNITIVE AND FUNCTIONAL STATUS - GENERAL
DAILY ACTIVITIY SCORE: 23
WALKING IN HOSPITAL ROOM: A LITTLE
SUGGESTED CMS G CODE MODIFIER DAILY ACTIVITY: CI
HELP NEEDED FOR BATHING: A LITTLE
SUGGESTED CMS G CODE MODIFIER MOBILITY: CJ
STANDING UP FROM CHAIR USING ARMS: A LITTLE
CLIMB 3 TO 5 STEPS WITH RAILING: A LITTLE
MOBILITY SCORE: 21

## 2018-06-27 ASSESSMENT — ENCOUNTER SYMPTOMS
RESPIRATORY NEGATIVE: 1
DIAPHORESIS: 0
VOMITING: 1
SEIZURES: 0
DOUBLE VISION: 0
NAUSEA: 0
CHILLS: 0
HEADACHES: 0
PSYCHIATRIC NEGATIVE: 1
NEUROLOGICAL NEGATIVE: 1
HEARTBURN: 0
EYES NEGATIVE: 1
BLOOD IN STOOL: 0
WEAKNESS: 1
DEPRESSION: 0
CARDIOVASCULAR NEGATIVE: 1
FEVER: 0
MUSCULOSKELETAL NEGATIVE: 1
DIARRHEA: 0
MYALGIAS: 0
CONSTITUTIONAL NEGATIVE: 1
CONSTIPATION: 0
SHORTNESS OF BREATH: 0
ABDOMINAL PAIN: 0
BLURRED VISION: 0
BACK PAIN: 0

## 2018-06-27 ASSESSMENT — PAIN SCALES - GENERAL
PAINLEVEL_OUTOF10: 4
PAINLEVEL_OUTOF10: 3
PAINLEVEL_OUTOF10: 6

## 2018-06-27 NOTE — PROGRESS NOTES
Patient transferred from unit bed S114 to S615 at 2300. Report received from CAIT Paige. Patient oriented, up with standby. VS and BG stable, HR slightly elevated, both charted appropriately. Patient complaining of 9/10 pain upon arrival to floor as well as some nausea.  Patient administered scheduled reglan PO. PRN pain medication administered. Patient vomited x 1, orange/clear emesis. Patient administered benadryl for vomiting. Patient resting comfortably at this time, with bed alarm on. Will continue to monitor.

## 2018-06-27 NOTE — PROGRESS NOTES
Pt is AOX4. On room air. She has LR @125 running through PICC line. She denies nausea at this time. However, she has abdominal pain, PRN medication given. She was able to tolerate eating 1 pancake and a boost for breakfast without difficulty. She is up to the bathroom with SBA. VSS. Will continue to monitor.

## 2018-06-27 NOTE — PROGRESS NOTES
Gastroenterology Progress Note     Author: Lior DIANA Child   Date & Time Created: 6/27/2018 7:42 AM    Chief Complaint:  Nausea/vomiting/abdominal pain    Interval History:  6/22/2018: Only 1 episode of vomiting in the last 24 hours.  Still have ongoing epigastric pain. Epigastric without trigger, recurrent not associated with food. No BM since admission. Passing flatus.  6/23/2018: Transferred to ICU due to low bicarbonate to start DKA protocol.  6/24/2018: More calm, no abdominal pain today. Patient nurse states only 2 episodes of emesis in the last 24 hours. Tachycardia improving.  6/25/2018: No vomiting today, denies nausea.  Epigastric soreness not necessarily pain.   6/26/2018: Continues to improve. Tolerated diet.  No N/V   6/27/2018:  Had a good night on medical floor.  Epigastric pain resolved, no N/V.  Feels like she could adv diet    Review of Systems:  Review of Systems   Constitutional: Negative.    HENT: Negative.    Eyes: Negative.    Respiratory: Negative.    Cardiovascular: Negative.    Gastrointestinal: Negative for blood in stool, constipation, diarrhea, heartburn and melena.   Genitourinary: Negative.    Musculoskeletal: Negative.    Skin: Negative.    Neurological: Negative.    Endo/Heme/Allergies: Negative.    Psychiatric/Behavioral: Negative.        Physical Exam:  Physical Exam   Constitutional: She is oriented to person, place, and time. She appears well-developed and well-nourished.   HENT:   Head: Normocephalic and atraumatic.   Eyes: Conjunctivae are normal. Pupils are equal, round, and reactive to light.   Neck: Normal range of motion. Neck supple.   Cardiovascular: Normal rate and regular rhythm.    Pulmonary/Chest: Effort normal and breath sounds normal.   Abdominal: Soft. Bowel sounds are normal. She exhibits no distension and no mass. There is no tenderness. There is no rebound and no guarding.   Musculoskeletal: Normal range of motion.   Neurological: She is alert and oriented to  person, place, and time.   Skin: Skin is warm and dry.       Labs:        Invalid input(s): [id number]      Recent Labs      18   0745  18   0545  18   0420   SODIUM  137  139  143   POTASSIUM  3.8  3.6  3.0*   CHLORIDE  115*  111  110   CO2  15*  21  25   BUN  3*  3*  4*   CREATININE  0.51  0.50  0.37*   CALCIUM  8.6  7.9*  8.2*     Recent Labs      18   0745  18   0545  18   0420   GLUCOSE  135*  227*  58*     Recent Labs      18   0545  18   0420   RBC  3.40*  3.93*   HEMOGLOBIN  9.9*  11.1*   HEMATOCRIT  29.0*  33.0*   PLATELETCT  145*  166     Recent Labs      18   0545  18   0420   WBC  3.8*  5.0   NEUTSPOLYS  30.50*  23.80*   LYMPHOCYTES  55.70*  64.60*   MONOCYTES  8.30  7.40   EOSINOPHILS  4.20  3.60   BASOPHILS  0.80  0.40     Hemodynamics:  Temp (24hrs), Av.4 °C (97.6 °F), Min:36.1 °C (97 °F), Max:36.8 °C (98.2 °F)  Temperature: 36.2 °C (97.2 °F)  Pulse  Av.8  Min: 60  Max: 166Heart Rate (Monitored): 85  Blood Pressure: 122/83, NIBP: 123/85     Respiratory:    Respiration: 16, Pulse Oximetry: 97 %        RUL Breath Sounds: Clear, RML Breath Sounds: Clear, RLL Breath Sounds: Diminished, NANCY Breath Sounds: Clear, LLL Breath Sounds: Diminished  Fluids:    Intake/Output Summary (Last 24 hours) at 18 1556  Last data filed at 18 1400   Gross per 24 hour   Intake                0 ml   Output              750 ml   Net             -750 ml     Weight: 83.6 kg (184 lb 4.9 oz)  GI/Nutrition:  Orders Placed This Encounter   Procedures   • Diet Order Diabetic, Full Liquid     Standing Status:   Standing     Number of Occurrences:   1     Order Specific Question:   Diet:     Answer:   Diabetic [3]     Order Specific Question:   Diet:     Answer:   Full Liquid [11]     Medical Decision Making, by Problem:  Active Hospital Problems    Diagnosis   • *Diabetic ketoacidosis (HCC) [E13.10]   • Intractable nausea and vomiting [R11.2]   •  Gastroparesis due to DM (HCC) [E11.43, K31.84]   • Esophagitis [K20.9]   • Type 1 diabetes mellitus (HCC) [E10.9]   • Pyloric stenosis [K31.1]   • Anemia [D64.9]   • Hypomagnesemia [E83.42]   • Hypokalemia [E87.6]     EGD 6/9/2018  IMPRESSIONS AND FINDINGS:  1.  [place] classification grade C ulcerative esophagitis -- likely the   result the patient's nausea and vomiting.  2.  Gastritis, suspected.  3.  Pyloric stenosis, possible, status post dilation.           Imaging:  UGI 6/19/2018  Examination is significantly limited as the patient vomited the majority of ingested contrast and refused to take more contrast by mouth.  No evidence of gastric outlet obstruction.  There may be mild gastric fold thickening which can be seen in the setting of gastritis.  No evidence of malrotation.     KUB 6/15/2018  No evidence for bowel obstruction.        HIDA 6/10/2018  1. Normal hepatobiliary scan. No evidence of acute cholecystitis.  2. Normal gallbladder ejection fraction.     US 6/9/2018  Heterogeneously echogenic lesion within the right lobe of the liver measuring 3 x 2.8 x 2.2 cm may represent a hemangioma and was seen on prior ultrasound. Confirmation can be obtained with hepatic protocol MRI.  Hepatomegaly.  No evidence of gallstones or biliary ductal dilatation.     CT 6/3/2018  1.  Thickening of the distal esophageal wall, new since prior study, consider component of esophagitis. Could be followed up with endoscopy for further evaluation as clinically appropriate.  2.  Hepatomegaly      Impressions:  1. Intractable nausea/vomiting.  Much improved.   2. Ulcerative esophagitis  3. s/p pyloric dilation for possible pyloric stenosis  4. Diabetic ketoacidosis - currently being managed.  5. Abdominal pain, improved  6. Possible gastroparesis (however 2014 normal emptying study)  7.  Focal hepatic lesion noted on US, ?hemangioma          Recommendations:  1.  Changed to PO Prilosec and recommend BID x 8 weeks  2.   Carafate slurry QID x 2 weeks total  3.  Small frequent meals, low fat and avoid fresh vegetables or larger portions of meat  4.  Reglan QID but recommend 1 week drug holiday every 12 weeks  5.  To follow up at [facility] with Dr. Tucker  6.  GI Signing Off  Quality-Core Measures

## 2018-06-27 NOTE — PROGRESS NOTES
Arrived in patient room for blood draw and patient stated she felt as though her BG was low. BG checked and result was 55. Four cups of apple juice administered. BG rechecked at 0443 result 88. More juice administered and final BG was 114 at 0507. Patient resting comfortably at this time, will continue to monitor.

## 2018-06-27 NOTE — CARE PLAN
Problem: Safety  Goal: Will remain free from injury  Outcome: PROGRESSING AS EXPECTED  Call bell within reach, non-skid socks 1 assist to bathroom     Problem: Infection  Goal: Will remain free from infection  Outcome: PROGRESSING AS EXPECTED

## 2018-06-27 NOTE — PROGRESS NOTES
Gastroenterology Progress Note     Author: Lior DIANA Child   Date & Time Created: 6/26/2018 5:26 PM    Chief Complaint:  Nausea/vomiting/abdominal pain    Interval History:  6/22/2018: Only 1 episode of vomiting in the last 24 hours.  Still have ongoing epigastric pain. Epigastric without trigger, recurrent not associated with food. No BM since admission. Passing flatus.  6/23/2018: Transferred to ICU due to low bicarbonate to start DKA protocol.  6/24/2018: More calm, no abdominal pain today. Patient nurse states only 2 episodes of emesis in the last 24 hours. Tachycardia improving.  6/25/2018: No vomiting today, denies nausea.  Epigastric soreness not necessarily pain.   6/26/2018: Continues to improve. Tolerated diet.  No N/V     Review of Systems:  Review of Systems   Constitutional: Negative.    HENT: Negative.    Eyes: Negative.    Respiratory: Negative.    Cardiovascular: Negative.    Gastrointestinal: Negative for blood in stool, constipation, diarrhea, heartburn and melena.   Genitourinary: Negative.    Musculoskeletal: Negative.    Skin: Negative.    Neurological: Negative.    Endo/Heme/Allergies: Negative.    Psychiatric/Behavioral: Negative.        Physical Exam:  Physical Exam   Constitutional: She is oriented to person, place, and time. She appears well-developed and well-nourished.   HENT:   Head: Normocephalic and atraumatic.   Eyes: Conjunctivae are normal. Pupils are equal, round, and reactive to light.   Neck: Normal range of motion. Neck supple.   Cardiovascular: Normal rate and regular rhythm.    Pulmonary/Chest: Effort normal and breath sounds normal.   Abdominal: Soft. Bowel sounds are normal. She exhibits no distension and no mass. There is no tenderness. There is no rebound and no guarding.   Musculoskeletal: Normal range of motion.   Neurological: She is alert and oriented to person, place, and time.   Skin: Skin is warm and dry.       Labs:        Invalid input(s): [id number]      Recent  Labs      1845  18   0545   SODIUM  134*   < >  135   < >  134*  137  139   POTASSIUM  3.5*   < >  3.4*   < >  3.6  3.8  3.6   CHLORIDE  114*   < >  113*   < >  115*  115*  111   CO2  10*   < >  13*   < >  15*  15*  21   BUN  3*   < >  4*   < >  3*  3*  3*   CREATININE  0.58   < >  0.45*   < >  0.54  0.51  0.50   MAGNESIUM  1.6   --   1.4*   --    --    --    --    PHOSPHORUS  1.5*   --   2.7   --    --    --    --    CALCIUM  8.7   < >  8.4*   < >  8.1*  8.6  7.9*    < > = values in this interval not displayed.     Recent Labs      1845  18   0545   ALTSGPT  13   --   11   --    --    --    --    ASTSGOT  11*   --   10*   --    --    --    --    ALKPHOSPHAT  97   --   85   --    --    --    --    TBILIRUBIN  0.5   --   0.6   --    --    --    --    GLUCOSE  133*   < >  117*   < >  105*  135*  227*    < > = values in this interval not displayed.     Recent Labs      18   0545   RBC  4.00*  3.40*   HEMOGLOBIN  11.6*  9.9*   HEMATOCRIT  33.7*  29.0*   PLATELETCT  199  145*     Recent Labs      18   0545   WBC   --    --   5.3  3.8*   NEUTSPOLYS   --    --   43.20*  30.50*   LYMPHOCYTES   --    --   44.10*  55.70*   MONOCYTES   --    --   8.70  8.30   EOSINOPHILS   --    --   3.00  4.20   BASOPHILS   --    --   0.60  0.80   ASTSGOT  11*  10*   --    --    ALTSGPT  13  11   --    --    ALKPHOSPHAT  97  85   --    --    TBILIRUBIN  0.5  0.6   --    --      Hemodynamics:  Temp (24hrs), Av.7 °C (98.1 °F), Min:36.6 °C (97.9 °F), Max:36.9 °C (98.4 °F)  Temperature: 36.8 °C (98.2 °F)  Pulse  Av.2  Min: 60  Max: 166Heart Rate (Monitored): 95  Blood Pressure: 136/94, NIBP: 108/64     Respiratory:    Respiration: 17, Pulse Oximetry: 95 %        RUL Breath Sounds: Clear, RML Breath Sounds:  Clear;Diminished, RLL Breath Sounds: Diminished, NANCY Breath Sounds: Clear, LLL Breath Sounds: Diminished  Fluids:    Intake/Output Summary (Last 24 hours) at 06/22/18 1556  Last data filed at 06/22/18 1400   Gross per 24 hour   Intake                0 ml   Output              750 ml   Net             -750 ml     Weight: 79.9 kg (176 lb 2.4 oz)  GI/Nutrition:  Orders Placed This Encounter   Procedures   • Diet Order Diabetic, Full Liquid     Standing Status:   Standing     Number of Occurrences:   1     Order Specific Question:   Diet:     Answer:   Diabetic [3]     Order Specific Question:   Diet:     Answer:   Full Liquid [11]     Medical Decision Making, by Problem:  Active Hospital Problems    Diagnosis   • *Diabetic ketoacidosis (HCC) [E13.10]   • Intractable nausea and vomiting [R11.2]   • Gastroparesis due to DM (HCC) [E11.43, K31.84]   • Esophagitis [K20.9]   • Type 1 diabetes mellitus (HCC) [E10.9]   • Pyloric stenosis [K31.1]   • Anemia [D64.9]   • Hypomagnesemia [E83.42]   • Hypokalemia [E87.6]     EGD 6/9/2018  IMPRESSIONS AND FINDINGS:  1.  [place] classification grade C ulcerative esophagitis -- likely the   result the patient's nausea and vomiting.  2.  Gastritis, suspected.  3.  Pyloric stenosis, possible, status post dilation.           Imaging:  UGI 6/19/2018  Examination is significantly limited as the patient vomited the majority of ingested contrast and refused to take more contrast by mouth.  No evidence of gastric outlet obstruction.  There may be mild gastric fold thickening which can be seen in the setting of gastritis.  No evidence of malrotation.     KUB 6/15/2018  No evidence for bowel obstruction.        HIDA 6/10/2018  1. Normal hepatobiliary scan. No evidence of acute cholecystitis.  2. Normal gallbladder ejection fraction.     US 6/9/2018  Heterogeneously echogenic lesion within the right lobe of the liver measuring 3 x 2.8 x 2.2 cm may represent a hemangioma and was seen on prior  ultrasound. Confirmation can be obtained with hepatic protocol MRI.  Hepatomegaly.  No evidence of gallstones or biliary ductal dilatation.     CT 6/3/2018  1.  Thickening of the distal esophageal wall, new since prior study, consider component of esophagitis. Could be followed up with endoscopy for further evaluation as clinically appropriate.  2.  Hepatomegaly      Impressions:  1. Intractable nausea/vomiting.  Much improved.  2. Insulin dependent Type 1 Diabetes  3. Diabetic ketoacidosis - currently being managed.  4. Abdominal pain  5. Possible gastroparesis (however 2014 normal emptying study)          Recommendations:  Agree with transfer to medical floor  On Reglan with other meds now prn  Continue Protonix but probably can switch to PO tomorrow  Continue Carafate until discharge  Quality-Core Measures

## 2018-06-27 NOTE — PROGRESS NOTES
RN skin check completed at bedside with CAIT Schilling. Skin intact. Generalized bruising noted on right inner forearm and R flank/rib cage from fall two days ago, per patient and report. No open areas noted. Patient resting comfortably at this time, will continue to monitor.

## 2018-06-27 NOTE — CARE PLAN
Problem: Safety  Goal: Will remain free from falls  Outcome: PROGRESSING AS EXPECTED      Problem: Bowel/Gastric:  Goal: Normal bowel function is maintained or improved  Outcome: PROGRESSING AS EXPECTED  Per day RN, one episode of vomiting around 5-6pm. Pt denying nausea for nightshift RN.     Problem: Pain Management  Goal: Pain level will decrease to patient's comfort goal  Outcome: PROGRESSING SLOWER THAN EXPECTED  Pt educated on opioids and their effect on peristalsis in the gut. Pt still reporting high pain scores and stating tramadol did not help pain.

## 2018-06-27 NOTE — PROGRESS NOTES
[facility] Hospitalist Progress Note    Date of Service: 2018    Chief Complaint  27 y.o. female admitted 2018 with nausea and vommiting    PMHx of TIDM poorly controled and diabetic gastroparesis.  Had an EGD in  showing erosive esophagitis and gastritis with pyloric stenosis which was dialated.  Sent home on PPI and carafate.  Re-admitted on 18 with DKA     Interval Problem Update  From intensive care unit  Hypoglycemic overnight, blood sugar of 68  Tolerating oral diet  No new complaints    Consultants/Specialty  GI  Critical Care,     Disposition  To home in 1-2 days with clinical improvement  Continue lactated Ringer      Review of Systems   Constitutional: Negative for chills, diaphoresis, fever and malaise/fatigue.   Eyes: Negative for blurred vision and double vision.   Respiratory: Negative for shortness of breath.    Cardiovascular: Negative for chest pain and leg swelling.   Gastrointestinal: Positive for vomiting. Negative for abdominal pain, constipation, diarrhea and nausea.   Musculoskeletal: Negative for back pain and myalgias.   Skin: Negative for rash.   Neurological: Positive for weakness. Negative for seizures and headaches.   Psychiatric/Behavioral: Negative for depression.      Physical Exam  Laboratory/Imaging   Hemodynamics  Temp (24hrs), Av.4 °C (97.5 °F), Min:36.1 °C (97 °F), Max:36.6 °C (97.9 °F)   Temperature: 36.5 °C (97.7 °F)  Pulse  Av.7  Min: 60  Max: 166 Heart Rate (Monitored): 85  Blood Pressure: 130/95, NIBP: 123/85      Respiratory      Respiration: 16, Pulse Oximetry: 97 %        RUL Breath Sounds: Clear, RML Breath Sounds: Clear, RLL Breath Sounds: Diminished, NANCY Breath Sounds: Clear, LLL Breath Sounds: Diminished    Fluids    Intake/Output Summary (Last 24 hours) at 18 1449  Last data filed at 18 1300   Gross per 24 hour   Intake             2540 ml   Output             1400 ml   Net             1140 ml       Nutrition  Orders Placed This  Encounter   Procedures   • Diet Order Diabetic, Low Fiber(GI Soft)     Standing Status:   Standing     Number of Occurrences:   1     Order Specific Question:   Diet:     Answer:   Diabetic [3]     Order Specific Question:   Diet:     Answer:   Low Fiber(GI Soft) [2]     Order Specific Question:   Macronutrient modifications:     Answer:   Low Fat [5]     Physical Exam   Constitutional: She is oriented to person, place, and time. She appears well-developed and well-nourished. No distress.   HENT:   Head: Normocephalic and atraumatic.   Mouth/Throat: No oropharyngeal exudate.   Eyes: EOM are normal. Pupils are equal, round, and reactive to light.   Neck: No JVD present.   Cardiovascular: Normal rate and regular rhythm.    Pulmonary/Chest: Effort normal. No respiratory distress. She has no rales.   Abdominal: Soft. She exhibits no distension. There is tenderness.   Musculoskeletal: She exhibits no edema or tenderness.   Neurological: She is alert and oriented to person, place, and time. No cranial nerve deficit. Coordination normal.   Skin: Skin is warm and dry. No rash noted. No erythema. No pallor.   Psychiatric: She has a normal mood and affect. Judgment and thought content normal.   Nursing note and vitals reviewed.      Recent Labs      06/26/18   0545  06/27/18   0420   WBC  3.8*  5.0   RBC  3.40*  3.93*   HEMOGLOBIN  9.9*  11.1*   HEMATOCRIT  29.0*  33.0*   MCV  85.3  84.0   MCH  29.1  28.2   MCHC  34.1  33.6   RDW  39.3  38.0   PLATELETCT  145*  166   MPV  10.2  10.7     Recent Labs      06/25/18   0745  06/26/18   0545  06/27/18   0420   SODIUM  137  139  143   POTASSIUM  3.8  3.6  3.0*   CHLORIDE  115*  111  110   CO2  15*  21  25   GLUCOSE  135*  227*  58*   BUN  3*  3*  4*   CREATININE  0.51  0.50  0.37*   CALCIUM  8.6  7.9*  8.2*                      Assessment/Plan     * Diabetic ketoacidosis (HCC)- (present on admission)   Assessment & Plan    Recurrent admissions with diabetic ketoacidosis  Lantus and  sliding scale  With hypoglycemia today  Decrease twice a day Lantus to 15 twice a day  Continue sliding scale insulin  Diabetic educator  Anion gap of 7  And tinea lactated Ringer for now, we'll consider discontinuation once blood sugar stable        Intractable nausea and vomiting- (present on admission)   Assessment & Plan    Patient by mouth Reglan  Continue PPI and Carafate          Gastroparesis due to DM (HCC)- (present on admission)   Assessment & Plan    Presumed gastroparesis flare  Appreciate GI consultation and recommendations  Patient has improved with scheduled Reglan  Benadryl or Ativan when necessary    Encourage compliance with recommended diet          Esophagitis- (present on admission)   Assessment & Plan    And gastritis as well as pyloric stenosis seen on EGD done on 6/9/18.  Continue Carafate and changed to by mouth Prilosec twice a day  GI signed off        Type 1 diabetes mellitus (HCC)- (present on admission)   Assessment & Plan    Previously HbA1c 8.8.          Hypomagnesemia   Assessment & Plan    Magnesium has been replentished.         Hypokalemia- (present on admission)   Assessment & Plan    Repleted  Add oral repletion  Monitor        Pyloric stenosis   Assessment & Plan    Status post dilatation on 6/9.   GI following           Quality-Core Measures   Reviewed items::  EKG reviewed, Labs reviewed, Medications reviewed and Radiology images reviewed  Pitt catheter::  No Pitt  DVT prophylaxis pharmacological::  Enoxaparin (Lovenox)  DVT prophylaxis - mechanical:  SCDs  Ulcer Prophylaxis::  Yes

## 2018-06-28 LAB
ANION GAP SERPL CALC-SCNC: 4 MMOL/L (ref 0–11.9)
BUN SERPL-MCNC: 4 MG/DL (ref 8–22)
CALCIUM SERPL-MCNC: 8.3 MG/DL (ref 8.5–10.5)
CHLORIDE SERPL-SCNC: 105 MMOL/L (ref 96–112)
CO2 SERPL-SCNC: 31 MMOL/L (ref 20–33)
CREAT SERPL-MCNC: 0.53 MG/DL (ref 0.5–1.4)
GLUCOSE BLD-MCNC: 173 MG/DL (ref 65–99)
GLUCOSE BLD-MCNC: 260 MG/DL (ref 65–99)
GLUCOSE BLD-MCNC: 265 MG/DL (ref 65–99)
GLUCOSE BLD-MCNC: 287 MG/DL (ref 65–99)
GLUCOSE SERPL-MCNC: 182 MG/DL (ref 65–99)
POTASSIUM SERPL-SCNC: 3.8 MMOL/L (ref 3.6–5.5)
SODIUM SERPL-SCNC: 140 MMOL/L (ref 135–145)

## 2018-06-28 PROCEDURE — 99233 SBSQ HOSP IP/OBS HIGH 50: CPT | Performed by: INTERNAL MEDICINE

## 2018-06-28 PROCEDURE — A9270 NON-COVERED ITEM OR SERVICE: HCPCS | Performed by: INTERNAL MEDICINE

## 2018-06-28 PROCEDURE — 700102 HCHG RX REV CODE 250 W/ 637 OVERRIDE(OP): Performed by: INTERNAL MEDICINE

## 2018-06-28 PROCEDURE — 700105 HCHG RX REV CODE 258: Performed by: INTERNAL MEDICINE

## 2018-06-28 PROCEDURE — 80048 BASIC METABOLIC PNL TOTAL CA: CPT

## 2018-06-28 PROCEDURE — 700111 HCHG RX REV CODE 636 W/ 250 OVERRIDE (IP): Performed by: HOSPITALIST

## 2018-06-28 PROCEDURE — 700102 HCHG RX REV CODE 250 W/ 637 OVERRIDE(OP): Performed by: FAMILY MEDICINE

## 2018-06-28 PROCEDURE — A9270 NON-COVERED ITEM OR SERVICE: HCPCS | Performed by: FAMILY MEDICINE

## 2018-06-28 PROCEDURE — 770006 HCHG ROOM/CARE - MED/SURG/GYN SEMI*

## 2018-06-28 PROCEDURE — 82962 GLUCOSE BLOOD TEST: CPT | Mod: 91

## 2018-06-28 RX ORDER — IBUPROFEN 400 MG/1
400 TABLET ORAL EVERY 6 HOURS PRN
Qty: 30 TAB | Refills: 1 | Status: ON HOLD | OUTPATIENT
Start: 2018-06-28 | End: 2018-07-07

## 2018-06-28 RX ORDER — AMOXICILLIN 250 MG
2 CAPSULE ORAL 2 TIMES DAILY
Qty: 30 TAB | Refills: 0 | Status: SHIPPED | OUTPATIENT
Start: 2018-06-28 | End: 2018-11-03

## 2018-06-28 RX ORDER — TRAMADOL HYDROCHLORIDE 50 MG/1
50 TABLET ORAL EVERY 6 HOURS PRN
Qty: 20 TAB | Refills: 0 | Status: SHIPPED | OUTPATIENT
Start: 2018-06-28 | End: 2018-06-29

## 2018-06-28 RX ORDER — INSULIN GLARGINE 100 [IU]/ML
15 INJECTION, SOLUTION SUBCUTANEOUS 2 TIMES DAILY
Qty: 10 ML | Refills: 2 | Status: SHIPPED | OUTPATIENT
Start: 2018-06-28 | End: 2018-06-29

## 2018-06-28 RX ORDER — IBUPROFEN 400 MG/1
400 TABLET ORAL EVERY 6 HOURS PRN
Status: DISCONTINUED | OUTPATIENT
Start: 2018-06-28 | End: 2018-06-29 | Stop reason: HOSPADM

## 2018-06-28 RX ORDER — OLANZAPINE 5 MG/1
5 TABLET ORAL EVERY EVENING
Qty: 30 TAB | Refills: 2 | Status: SHIPPED | OUTPATIENT
Start: 2018-06-28 | End: 2018-11-03

## 2018-06-28 RX ADMIN — METOCLOPRAMIDE HYDROCHLORIDE 10 MG: 10 TABLET ORAL at 12:33

## 2018-06-28 RX ADMIN — OMEPRAZOLE 20 MG: 20 CAPSULE, DELAYED RELEASE ORAL at 20:10

## 2018-06-28 RX ADMIN — GABAPENTIN 100 MG: 100 CAPSULE ORAL at 09:01

## 2018-06-28 RX ADMIN — METOCLOPRAMIDE HYDROCHLORIDE 10 MG: 10 TABLET ORAL at 23:58

## 2018-06-28 RX ADMIN — OXYCODONE HYDROCHLORIDE 10 MG: 10 TABLET ORAL at 14:35

## 2018-06-28 RX ADMIN — SUCRALFATE 1 G: 1 SUSPENSION ORAL at 17:41

## 2018-06-28 RX ADMIN — GABAPENTIN 100 MG: 100 CAPSULE ORAL at 20:10

## 2018-06-28 RX ADMIN — METOCLOPRAMIDE HYDROCHLORIDE 10 MG: 10 TABLET ORAL at 17:42

## 2018-06-28 RX ADMIN — OLANZAPINE 5 MG: 5 TABLET, FILM COATED ORAL at 20:10

## 2018-06-28 RX ADMIN — POTASSIUM CHLORIDE 20 MEQ: 1500 TABLET, EXTENDED RELEASE ORAL at 09:01

## 2018-06-28 RX ADMIN — OMEPRAZOLE 20 MG: 20 CAPSULE, DELAYED RELEASE ORAL at 09:00

## 2018-06-28 RX ADMIN — INSULIN GLARGINE 15 UNITS: 100 INJECTION, SOLUTION SUBCUTANEOUS at 09:03

## 2018-06-28 RX ADMIN — SODIUM CHLORIDE, POTASSIUM CHLORIDE, SODIUM LACTATE AND CALCIUM CHLORIDE: 600; 310; 30; 20 INJECTION, SOLUTION INTRAVENOUS at 04:10

## 2018-06-28 RX ADMIN — SUCRALFATE 1 G: 1 SUSPENSION ORAL at 05:12

## 2018-06-28 RX ADMIN — SUCRALFATE 1 G: 1 SUSPENSION ORAL at 12:35

## 2018-06-28 RX ADMIN — ENOXAPARIN SODIUM 40 MG: 100 INJECTION SUBCUTANEOUS at 20:10

## 2018-06-28 RX ADMIN — INSULIN HUMAN 3 UNITS: 100 INJECTION, SOLUTION PARENTERAL at 05:20

## 2018-06-28 RX ADMIN — POTASSIUM CHLORIDE 20 MEQ: 1500 TABLET, EXTENDED RELEASE ORAL at 20:10

## 2018-06-28 RX ADMIN — CHOLECALCIFEROL TAB 10 MCG (400 UNIT) 400 UNITS: 10 TAB at 09:01

## 2018-06-28 RX ADMIN — METOCLOPRAMIDE HYDROCHLORIDE 10 MG: 10 TABLET ORAL at 05:12

## 2018-06-28 RX ADMIN — SUCRALFATE 1 G: 1 SUSPENSION ORAL at 23:58

## 2018-06-28 RX ADMIN — STANDARDIZED SENNA CONCENTRATE AND DOCUSATE SODIUM 2 TABLET: 8.6; 5 TABLET, FILM COATED ORAL at 20:10

## 2018-06-28 RX ADMIN — INSULIN HUMAN 7 UNITS: 100 INJECTION, SOLUTION PARENTERAL at 17:45

## 2018-06-28 RX ADMIN — INSULIN GLARGINE 15 UNITS: 100 INJECTION, SOLUTION SUBCUTANEOUS at 20:46

## 2018-06-28 RX ADMIN — ATORVASTATIN CALCIUM 20 MG: 20 TABLET, FILM COATED ORAL at 09:01

## 2018-06-28 RX ADMIN — INSULIN HUMAN 7 UNITS: 100 INJECTION, SOLUTION PARENTERAL at 12:31

## 2018-06-28 ASSESSMENT — ENCOUNTER SYMPTOMS
CONSTIPATION: 0
VOMITING: 1
WEAKNESS: 1
DIZZINESS: 0
MYALGIAS: 0
SHORTNESS OF BREATH: 0
DEPRESSION: 0
HEADACHES: 0
MEMORY LOSS: 0
SEIZURES: 0
NAUSEA: 0
NERVOUS/ANXIOUS: 0
BACK PAIN: 0
ABDOMINAL PAIN: 0
CHILLS: 0
FEVER: 0

## 2018-06-28 ASSESSMENT — PAIN SCALES - GENERAL
PAINLEVEL_OUTOF10: 0
PAINLEVEL_OUTOF10: 0

## 2018-06-28 NOTE — PROGRESS NOTES
"Patient A&O x 4, up with standby assist. VS and BG stable overnight. No new complaints of pain overnight just felt \"sore all over\" from previous fall in ICU. Discussed trying to wean away from opiates in order to improve GI function. Patient on board with plan. No complaints of N/V overnight. Patient tolerating diet well. Patient resting comfortably at this time, will continue to monitor.  "

## 2018-06-28 NOTE — CARE PLAN
Problem: Infection  Goal: Will remain free from infection    Intervention: Assess signs and symptoms of infection  Assess for S&S of infection by monitoring VS and lab values.

## 2018-06-28 NOTE — CARE PLAN
Problem: Communication  Goal: The ability to communicate needs accurately and effectively will improve    Intervention: [place] patient and significant other/support system to call light to alert staff of needs  Educate patient on calling prior to ambulating to BR.

## 2018-06-28 NOTE — DISCHARGE SUMMARY
Discharge Summary    CHIEF COMPLAINT ON ADMISSION  Chief Complaint   Patient presents with   • High Blood Sugar     FSBS 412   • Vomiting     x 1 day   • Abdominal Pain     mid upper abd pain        Reason for Admission  Vomiting     Admission Date  6/13/2018    CODE STATUS  Full Code    HPI & HOSPITAL COURSE  This is a 27 y.o. female here with nausea and vommiting, with PMHx of TIDM poorly controled and diabetic gastroparesis.  Had an EGD in 6/9 showing erosive esophagitis and gastritis with pyloric stenosis which was dialated.  Sent home on PPI and carafate.  Re-admitted on 6/13/18 with DKA. She has been transitioned to Lantus and sliding scale insulin coverage. She was noted to have several episodes of hypoglycemia and did require IV fluid administration with lactated ringer solution. She is now tolerating oral diet with improvement of abdominal pain. Blood sugar appears to be better controlled. Nausea and vomiting has resolved. She has been started on scheduled Reglan with improvement of gastroparesis.    She has been cleared for discharge from GI standpoint. We have encouraged continued close blood sugar monitoring and insulin as needed.       In prescribing controlled substances to this patient, I certify that I have obtained and reviewed the medical history of Alis Sparks. I have also made a good emily effort to obtain applicable records from other providers who have treated the patient and records did not demonstrate any increased risk of substance abuse that would prevent me from prescribing controlled substances.     I have conducted a physical exam and documented it. I have reviewed Ms. Sparks’s prescription history as maintained by the [place] Prescription Monitoring Program.     I have assessed the patient’s risk for abuse, dependency, and addiction using the validated Opioid Risk Tool available at https://www.mdcalc.com/pftaxv-wdum-qaow-ort-narcotic-abuse.     Given the above, I believe  the benefits of controlled substance therapy outweigh the risks. The reasons for prescribing controlled substances include non-narcotic, oral analgesic alternatives have been inadequate for pain control. Accordingly, I have discussed the risk and benefits, treatment plan, and alternative therapies with the patient.       Therefore, she is discharged in good and stable condition to home with close outpatient follow-up.    The patient met 2-midnight criteria for an inpatient stay at the time of discharge.    Discharge Date  June 29, 2018    FOLLOW UP ITEMS POST DISCHARGE  Discharged to home in a.m. if clinically improved, June 29, 2018  Follow-up with primary care provider/discharge clinic in one week    DISCHARGE DIAGNOSES  Principal Problem:    Diabetic ketoacidosis (HCC) POA: Yes  Active Problems:    Gastroparesis due to DM (HCC) POA: Yes    Intractable nausea and vomiting POA: Yes    Type 1 diabetes mellitus (HCC) POA: Yes    Esophagitis POA: Yes    Pyloric stenosis (Chronic) POA: No    Hypokalemia POA: Yes    Hypomagnesemia POA: Unknown  Resolved Problems:    Hyponatremia POA: Yes    Anemia POA: Yes    Dehydration POA: Yes      FOLLOW UP  Future Appointments  Date Time Provider Department Center   8/30/2018 10:00 AM Jose Castro M.D. [facility] None     No follow-up provider specified.    MEDICATIONS ON DISCHARGE     Medication List      START taking these medications      Instructions   ibuprofen 400 MG Tabs  Commonly known as:  MOTRIN   Take 1 Tab by mouth every 6 hours as needed (pain).  Dose:  400 mg     insulin regular 100 Unit/mL Soln  Commonly known as:  HUMULIN R   Inject 3-14 Units as instructed every 6 hours.  Dose:  3-14 Units     OLANZapine 5 MG Tabs  Commonly known as:  ZYPREXA   Take 1 Tab by mouth every evening.  Dose:  5 mg     senna-docusate 8.6-50 MG Tabs  Commonly known as:  PERICOLACE or SENOKOT S   Take 2 Tabs by mouth 2 Times a Day.  Dose:  2 Tab     tramadol 50 MG Tabs  Commonly known as:   ULTRAM   Take 1 Tab by mouth every 6 hours as needed for up to 5 days.  Dose:  50 mg        CHANGE how you take these medications      Instructions   insulin glargine 100 UNIT/ML Soln  What changed:  how much to take  Commonly known as:  LANTUS   Inject 17 Units as instructed 2 Times a Day.  Dose:  17 Units        CONTINUE taking these medications      Instructions   atorvastatin 20 MG Tabs  Commonly known as:  LIPITOR   Take 1 Tab by mouth every day.  Dose:  20 mg     Cholecalciferol 2000 UNIT Caps   Take 1 Cap by mouth every day.  Dose:  1 Cap     ferrous sulfate 325 (65 Fe) MG tablet   Take 325 mg by mouth every day.  Dose:  325 mg     gabapentin 100 MG Caps  Commonly known as:  NEURONTIN   Take 1 Cap by mouth 2 Times a Day.  Dose:  100 mg     metoclopramide 10 MG/10ML Soln  Commonly known as:  REGLAN   Take 10 mg by mouth 3 times a day before meals.  Dose:  10 mg     omeprazole 20 MG delayed-release capsule  Commonly known as:  PRILOSEC   Take 1 Cap by mouth every day.  Dose:  20 mg     sucralfate 1 GM/10ML Susp  Commonly known as:  CARAFATE   Take 10 mL by mouth every 6 hours.  Dose:  1 g        STOP taking these medications    insulin glulisine 100 UNIT/ML Soln  Commonly known as:  APIDRA            Allergies  Allergies   Allergen Reactions   • Pcn [Penicillins] Shortness of Breath and Swelling     Per patient's Mom, patient tolerates Keflex   • Lisinopril Unspecified     Per historical: Reported pedal swelling. No facial/angioedema or rash nor respiratory symptoms.    • Promethazine Vomiting       DIET  Orders Placed This Encounter   Procedures   • Diet Order Diabetic, Low Fiber(GI Soft)     Standing Status:   Standing     Number of Occurrences:   1     Order Specific Question:   Diet:     Answer:   Diabetic [3]     Order Specific Question:   Diet:     Answer:   Low Fiber(GI Soft) [2]     Order Specific Question:   Macronutrient modifications:     Answer:   Low Fat [5]       ACTIVITY  As tolerated.  Weight  bearing as tolerated    CONSULTATIONS  Intensivist  GI  Diabetic educator    PROCEDURES  Central line placement and removal    LABORATORY  Lab Results   Component Value Date    SODIUM 139 06/29/2018    POTASSIUM 3.8 06/29/2018    CHLORIDE 104 06/29/2018    CO2 31 06/29/2018    GLUCOSE 219 (H) 06/29/2018    BUN 7 (L) 06/29/2018    CREATININE 0.47 (L) 06/29/2018        Lab Results   Component Value Date    WBC 5.0 06/27/2018    HEMOGLOBIN 11.1 (L) 06/27/2018    HEMATOCRIT 33.0 (L) 06/27/2018    PLATELETCT 166 06/27/2018        Total time of the discharge process exceeds 45 minutes.

## 2018-06-28 NOTE — PROGRESS NOTES
[facility] Hospitalist Progress Note    Date of Service: 2018    Chief Complaint  27 y.o. female admitted 2018 with nausea and vommiting    PMHx of TIDM poorly controled and diabetic gastroparesis.  Had an EGD in  showing erosive esophagitis and gastritis with pyloric stenosis which was dialated.  Sent home on PPI and carafate.  Re-admitted on 18 with DKA     Interval Problem Update  No new complaints bs 180's  Tolerating diet    Consultants/Specialty  GI  Critical Care,     Disposition  To home in 1-2 days with clinical improvement        Review of Systems   Constitutional: Negative for chills and fever.   Respiratory: Negative for shortness of breath.    Cardiovascular: Negative for chest pain and leg swelling.   Gastrointestinal: Positive for vomiting. Negative for abdominal pain, constipation and nausea.   Musculoskeletal: Negative for back pain and myalgias.   Skin: Negative for rash.   Neurological: Positive for weakness. Negative for dizziness, seizures and headaches.   Psychiatric/Behavioral: Negative for depression and memory loss. The patient is not nervous/anxious.       Physical Exam  Laboratory/Imaging   Hemodynamics  Temp (24hrs), Av.3 °C (97.3 °F), Min:36.2 °C (97.2 °F), Max:36.4 °C (97.5 °F)   Temperature: 36.2 °C (97.2 °F)  Pulse  Av.7  Min: 60  Max: 166    Blood Pressure: 131/103      Respiratory      Respiration: 16, Pulse Oximetry: 95 %        RUL Breath Sounds: Clear, RML Breath Sounds: Clear, RLL Breath Sounds: Diminished, NANCY Breath Sounds: Clear, LLL Breath Sounds: Diminished    Fluids    Intake/Output Summary (Last 24 hours) at 18 1223  Last data filed at 18 0500   Gross per 24 hour   Intake             2315 ml   Output                0 ml   Net             2315 ml       Nutrition  Orders Placed This Encounter   Procedures   • Diet Order Diabetic, Low Fiber(GI Soft)     Standing Status:   Standing     Number of Occurrences:   1     Order Specific Question:    Diet:     Answer:   Diabetic [3]     Order Specific Question:   Diet:     Answer:   Low Fiber(GI Soft) [2]     Order Specific Question:   Macronutrient modifications:     Answer:   Low Fat [5]     Physical Exam   Constitutional: She is oriented to person, place, and time. She appears well-developed and well-nourished. No distress.   HENT:   Head: Normocephalic and atraumatic.   Mouth/Throat: Oropharynx is clear and moist.   Eyes: EOM are normal. Pupils are equal, round, and reactive to light.   Cardiovascular: Normal rate and regular rhythm.    Pulmonary/Chest: Effort normal. No respiratory distress.   Abdominal: Soft. She exhibits no distension. There is tenderness.   Musculoskeletal: She exhibits no edema or tenderness.   Neurological: She is alert and oriented to person, place, and time. No cranial nerve deficit.   Skin: Skin is warm and dry. She is not diaphoretic.   Psychiatric: She has a normal mood and affect. Judgment and thought content normal.   Nursing note and vitals reviewed.      Recent Labs      06/26/18   0545  06/27/18   0420   WBC  3.8*  5.0   RBC  3.40*  3.93*   HEMOGLOBIN  9.9*  11.1*   HEMATOCRIT  29.0*  33.0*   MCV  85.3  84.0   MCH  29.1  28.2   MCHC  34.1  33.6   RDW  39.3  38.0   PLATELETCT  145*  166   MPV  10.2  10.7     Recent Labs      06/26/18   0545  06/27/18   0420  06/28/18   0415   SODIUM  139  143  140   POTASSIUM  3.6  3.0*  3.8   CHLORIDE  111  110  105   CO2  21  25  31   GLUCOSE  227*  58*  182*   BUN  3*  4*  4*   CREATININE  0.50  0.37*  0.53   CALCIUM  7.9*  8.2*  8.3*                      Assessment/Plan     * Diabetic ketoacidosis (HCC)- (present on admission)   Assessment & Plan    Recurrent admissions with diabetic ketoacidosis  Lantus and sliding scale  Improving control   twice a day Lantus to 15 twice a day  Continue sliding scale insulin  Diabetic educator    Dc lactated Ringer         Intractable nausea and vomiting- (present on admission)   Assessment & Plan     Patient by mouth Reglan  Continue PPI and Carafate          Gastroparesis due to DM (HCC)- (present on admission)   Assessment & Plan    Presumed gastroparesis flare  Appreciate GI consultation and recommendations  Patient has improved with scheduled Reglan  Benadryl or Ativan when necessary    Encourage compliance with recommended diet          Esophagitis- (present on admission)   Assessment & Plan    And gastritis as well as pyloric stenosis seen on EGD done on 6/9/18.  Continue Carafate and changed to by mouth Prilosec twice a day  GI signed off        Type 1 diabetes mellitus (HCC)- (present on admission)   Assessment & Plan    Previously HbA1c 8.8.          Hypomagnesemia   Assessment & Plan    Magnesium has been replentished.         Hypokalemia- (present on admission)   Assessment & Plan    Repleted   oral repletion  Monitor        Pyloric stenosis   Assessment & Plan    Status post dilatation on 6/9.   GI following           Quality-Core Measures   Reviewed items::  EKG reviewed, Labs reviewed, Medications reviewed and Radiology images reviewed  Pitt catheter::  No Pitt  DVT prophylaxis pharmacological::  Enoxaparin (Lovenox)  DVT prophylaxis - mechanical:  SCDs  Ulcer Prophylaxis::  Yes

## 2018-06-28 NOTE — CARE PLAN
Problem: Nutritional:  Goal: Achieve adequate nutritional intake  Patient will and consume >50% of meals and supplements  Now on full liquid diet   Outcome: MET Date Met: 06/28/18    Intervention: Advance diet as tolerated  Pt now on diabetic/low fat/low fiber diet.     Intervention: Monitor PO intake, weights, and laboratory values  Visited pt at bedside.   Pt states that she is tolerating diet well and is drinking Boost GC supplements.  Pt denied any further nutrition needs.   ADLs show pt consuming >50% of meals.     RD available prn

## 2018-06-29 VITALS
HEIGHT: 65 IN | WEIGHT: 184.3 LBS | OXYGEN SATURATION: 97 % | DIASTOLIC BLOOD PRESSURE: 77 MMHG | TEMPERATURE: 97.3 F | BODY MASS INDEX: 30.71 KG/M2 | SYSTOLIC BLOOD PRESSURE: 117 MMHG | HEART RATE: 100 BPM | RESPIRATION RATE: 16 BRPM

## 2018-06-29 LAB
ANION GAP SERPL CALC-SCNC: 4 MMOL/L (ref 0–11.9)
BUN SERPL-MCNC: 7 MG/DL (ref 8–22)
CALCIUM SERPL-MCNC: 8.2 MG/DL (ref 8.5–10.5)
CHLORIDE SERPL-SCNC: 104 MMOL/L (ref 96–112)
CO2 SERPL-SCNC: 31 MMOL/L (ref 20–33)
CREAT SERPL-MCNC: 0.47 MG/DL (ref 0.5–1.4)
GLUCOSE BLD-MCNC: 184 MG/DL (ref 65–99)
GLUCOSE BLD-MCNC: 254 MG/DL (ref 65–99)
GLUCOSE BLD-MCNC: 360 MG/DL (ref 65–99)
GLUCOSE BLD-MCNC: 376 MG/DL (ref 65–99)
GLUCOSE SERPL-MCNC: 219 MG/DL (ref 65–99)
POTASSIUM SERPL-SCNC: 3.8 MMOL/L (ref 3.6–5.5)
SODIUM SERPL-SCNC: 139 MMOL/L (ref 135–145)

## 2018-06-29 PROCEDURE — 99239 HOSP IP/OBS DSCHRG MGMT >30: CPT | Performed by: INTERNAL MEDICINE

## 2018-06-29 PROCEDURE — A9270 NON-COVERED ITEM OR SERVICE: HCPCS | Performed by: INTERNAL MEDICINE

## 2018-06-29 PROCEDURE — A9270 NON-COVERED ITEM OR SERVICE: HCPCS | Performed by: FAMILY MEDICINE

## 2018-06-29 PROCEDURE — 700102 HCHG RX REV CODE 250 W/ 637 OVERRIDE(OP): Performed by: FAMILY MEDICINE

## 2018-06-29 PROCEDURE — 700102 HCHG RX REV CODE 250 W/ 637 OVERRIDE(OP): Performed by: INTERNAL MEDICINE

## 2018-06-29 PROCEDURE — 82962 GLUCOSE BLOOD TEST: CPT | Mod: 91

## 2018-06-29 PROCEDURE — 80048 BASIC METABOLIC PNL TOTAL CA: CPT

## 2018-06-29 RX ORDER — INSULIN GLARGINE 100 [IU]/ML
17 INJECTION, SOLUTION SUBCUTANEOUS 2 TIMES DAILY
Qty: 10 ML | Refills: 2 | Status: SHIPPED | OUTPATIENT
Start: 2018-06-29 | End: 2018-07-03

## 2018-06-29 RX ORDER — TRAMADOL HYDROCHLORIDE 50 MG/1
50 TABLET ORAL EVERY 6 HOURS PRN
Qty: 20 TAB | Refills: 0 | Status: ON HOLD | OUTPATIENT
Start: 2018-06-29 | End: 2018-07-07

## 2018-06-29 RX ADMIN — INSULIN GLARGINE 15 UNITS: 100 INJECTION, SOLUTION SUBCUTANEOUS at 10:10

## 2018-06-29 RX ADMIN — INSULIN HUMAN 12 UNITS: 100 INJECTION, SOLUTION PARENTERAL at 13:53

## 2018-06-29 RX ADMIN — INSULIN HUMAN 3 UNITS: 100 INJECTION, SOLUTION PARENTERAL at 05:31

## 2018-06-29 RX ADMIN — METOCLOPRAMIDE HYDROCHLORIDE 10 MG: 10 TABLET ORAL at 05:28

## 2018-06-29 RX ADMIN — OMEPRAZOLE 20 MG: 20 CAPSULE, DELAYED RELEASE ORAL at 08:59

## 2018-06-29 RX ADMIN — ATORVASTATIN CALCIUM 20 MG: 20 TABLET, FILM COATED ORAL at 08:59

## 2018-06-29 RX ADMIN — SUCRALFATE 1 G: 1 SUSPENSION ORAL at 13:55

## 2018-06-29 RX ADMIN — INSULIN HUMAN 7 UNITS: 100 INJECTION, SOLUTION PARENTERAL at 00:04

## 2018-06-29 RX ADMIN — CHOLECALCIFEROL TAB 10 MCG (400 UNIT) 400 UNITS: 10 TAB at 08:58

## 2018-06-29 RX ADMIN — OXYCODONE HYDROCHLORIDE 10 MG: 10 TABLET ORAL at 09:02

## 2018-06-29 RX ADMIN — POTASSIUM CHLORIDE 20 MEQ: 1500 TABLET, EXTENDED RELEASE ORAL at 08:58

## 2018-06-29 RX ADMIN — STANDARDIZED SENNA CONCENTRATE AND DOCUSATE SODIUM 2 TABLET: 8.6; 5 TABLET, FILM COATED ORAL at 08:59

## 2018-06-29 RX ADMIN — SUCRALFATE 1 G: 1 SUSPENSION ORAL at 05:28

## 2018-06-29 RX ADMIN — GABAPENTIN 100 MG: 100 CAPSULE ORAL at 08:58

## 2018-06-29 ASSESSMENT — PAIN SCALES - GENERAL
PAINLEVEL_OUTOF10: 0
PAINLEVEL_OUTOF10: 9

## 2018-06-29 NOTE — PROGRESS NOTES
Diabetes education: met with patient and mom to review diabetes management. Pt states she has all her supplies and insulin at home. Reviewed insulin pen, storage, shelf life and site rotation. Pt states she is feeling better and is eating now.  Pt hopes to go home tomorrow. Pt has type one diabetes and now that she is eating should be on both a meal bolus as well as a correction for her blood sugars. Pt uses a correction and a 1: 6 carb ratio at home. Finger sticks should be ac and hs (not every six hours) and if possible limit fast acting insulin at hs. Please call [phone] if needs change.

## 2018-06-29 NOTE — CARE PLAN
Problem: Safety  Goal: Will remain free from falls  Outcome: PROGRESSING AS EXPECTED  Pt educated on safety plan and uses call light appropriately.     Problem: Bowel/Gastric:  Goal: Normal bowel function is maintained or improved  Outcome: PROGRESSING AS EXPECTED  Pt has no complaints of nausea or vomiting. Will continue to monitor.

## 2018-06-29 NOTE — CARE PLAN
Problem: Safety  Goal: Will remain free from injury  Outcome: PROGRESSING AS EXPECTED  Patient remains free from falls. Safety precautions in place.    Problem: Pain Management  Goal: Pain level will decrease to patient's comfort goal  Outcome: PROGRESSING AS EXPECTED  Patient medicated for pain x1 thus far on shift.

## 2018-06-29 NOTE — PROGRESS NOTES
Patient is alert and oriented. Vitals stable. Up independently in the room. Medicated for pain x1 thus far on shift. Blood sugars monitored throughout the day. Plan for discharge home later today.    Remains free from falls with safety precautions in place. Call bell within reach, nonskid footwear in use, hourly rounding performed.

## 2018-06-30 NOTE — PROGRESS NOTES
Patient discharged at 1815 to home via private auto with family. Denied hospital escort. Discharge instructions reviewed, follow up appointments discussed. All belongings with patient. IV site discontinued.

## 2018-06-30 NOTE — DISCHARGE INSTRUCTIONS
Discharge Instructions    Discharged to home by car with relative. Discharged via walking, hospital escort: Refused.  Special equipment needed: Not Applicable    Be sure to schedule a follow-up appointment with your primary care doctor or any specialists as instructed.     Discharge Plan:   Diet Plan: Discussed  Activity Level: Discussed  Confirmed Follow up Appointment: Patient to Call and Schedule Appointment  Confirmed Symptoms Management: Discussed  Medication Reconciliation Updated: Yes  Influenza Vaccine Indication: Not indicated: Previously immunized this influenza season and > 8 years of age    I understand that a diet low in cholesterol, fat, and sodium is recommended for good health. Unless I have been given specific instructions below for another diet, I accept this instruction as my diet prescription.   Other diet: Low five, low fat, diabetic    Special Instructions: None    · Is patient discharged on Warfarin / Coumadin?   No       Diabetic Ketoacidosis  Diabetic ketoacidosis is a life-threatening complication of diabetes. If it is not treated, it can cause severe dehydration and organ damage and can lead to a coma or death.  What are the causes?  This condition develops when there is not enough of the hormone insulin in the body. Insulin helps the body to break down sugar for energy. Without insulin, the body cannot break down sugar, so it breaks down fats instead. This leads to the production of acids that are called ketones. Ketones are poisonous at high levels.  This condition can be triggered by:  · Stress on the body that is brought on by an illness.  · Medicines that raise blood glucose levels.  · Not taking diabetes medicine.  What are the signs or symptoms?  Symptoms of this condition include:  · Fatigue.  · Weight loss.  · Excessive thirst.  · Light-headedness.  · Fruity or sweet-smelling breath.  · Excessive urination.  · Vision changes.  · Confusion or  irritability.  · Nausea.  · Vomiting.  · Rapid breathing.  · Abdominal pain.  · Feeling flushed.  How is this diagnosed?  This condition is diagnosed based on a medical history, a physical exam, and blood tests. You may also have a urine test that checks for ketones.  How is this treated?  This condition may be treated with:  · Fluid replacement. This may be done to correct dehydration.  · Insulin injections. These may be given through the skin or through an IV tube.  · Electrolyte replacement. Electrolytes, such as potassium and sodium, may be given in pill form or through an IV tube.  · Antibiotic medicines. These may be prescribed if your condition was caused by an infection.  Follow these instructions at home:  Eating and drinking  · Drink enough fluids to keep your urine clear or pale yellow.  · If you cannot eat, alternate between drinking fluids with sugar (such as juice) and salty fluids (such as broth or bouillon).  · If you can eat, follow your usual diet and drink sugar-free liquids, such as water.  Other Instructions   · Take insulin as directed by your health care provider. Do not skip insulin injections. Do not use  insulin.  · If your blood sugar is over 240 mg/dL, monitor your urine ketones every 4-6 hours.  · If you were prescribed an antibiotic medicine, finish all of it even if you start to feel better.  · Rest and exercise only as directed by your health care provider.  · If you get sick, call your health care provider and begin treatment quickly. Your body often needs extra insulin to fight an illness.  · Check your blood glucose levels regularly. If your blood glucose is high, drink plenty of fluids. This helps to flush out ketones.  Contact a health care provider if:  · Your blood glucose level is too high or too low.  · You have ketones in your urine.  · You have a fever.  · You cannot eat.  · You cannot tolerate fluids.  · You have been vomiting for more than 2 hours.  · You continue  to have symptoms of this condition.  · You develop new symptoms.  Get help right away if:  · Your blood glucose levels continue to be high (elevated).  · Your monitor reads “high” even when you are taking insulin.  · You faint.  · You have chest pain.  · You have trouble breathing.  · You have a sudden, severe headache.  · You have sudden weakness in one arm or one leg.  · You have sudden trouble speaking or swallowing.  · You have vomiting or diarrhea that gets worse after 3 hours.  · You feel severely fatigued.  · You have trouble thinking.  · You have abdominal pain.  · You are severely dehydrated. Symptoms of severe dehydration include:  ¨ Extreme thirst.  ¨ Dry mouth.  ¨ Blue lips.  ¨ Cold hands and feet.  ¨ Rapid breathing.  This information is not intended to replace advice given to you by your health care provider. Make sure you discuss any questions you have with your health care provider.  Document Released: 12/15/2001 Document Revised: 05/25/2017 Document Reviewed: 11/25/2015  [employer] Interactive Patient Education © 2017 [employer]      Depression / Suicide Risk    As you are discharged from this [facility] facility, it is important to learn how to keep safe from harming yourself.    Recognize the warning signs:  · Abrupt changes in personality, positive or negative- including increase in energy   · Giving away possessions  · Change in eating patterns- significant weight changes-  positive or negative  · Change in sleeping patterns- unable to sleep or sleeping all the time   · Unwillingness or inability to communicate  · Depression  · Unusual sadness, discouragement and loneliness  · Talk of wanting to die  · Neglect of personal appearance   · Rebelliousness- reckless behavior  · Withdrawal from people/activities they love  · Confusion- inability to concentrate     If you or a loved one observes any of these behaviors or has concerns about self-harm, here's what you can do:  · Talk about it- your  feelings and reasons for harming yourself  · Remove any means that you might use to hurt yourself (examples: pills, rope, extension cords, firearm)  · Get professional help from the community (Mental Health, Substance Abuse, psychological counseling)  · Do not be alone:Call your Safe Contact- someone whom you trust who will be there for you.  · Call your local CRISIS HOTLINE 703-1030 or 696-550-4902  · Call your local Children's Mobile Crisis Response Team [place] (861) 560-6256 or www.FIGS  · Call the toll free National Suicide Prevention Hotlines   · National Suicide Prevention Lifeline 778-191-ZDLN (1976)  · National Hope Line Network 800-SUICIDE (009-0628)

## 2018-07-03 ENCOUNTER — HOSPITAL ENCOUNTER (INPATIENT)
Facility: MEDICAL CENTER | Age: 28
LOS: 4 days | DRG: 638 | End: 2018-07-07
Attending: EMERGENCY MEDICINE | Admitting: INTERNAL MEDICINE
Payer: MEDICAID

## 2018-07-03 DIAGNOSIS — E10.10 DIABETIC KETOACIDOSIS WITHOUT COMA ASSOCIATED WITH TYPE 1 DIABETES MELLITUS (HCC): ICD-10-CM

## 2018-07-03 DIAGNOSIS — K31.84 GASTROPARESIS: ICD-10-CM

## 2018-07-03 DIAGNOSIS — R11.2 NON-INTRACTABLE VOMITING WITH NAUSEA, UNSPECIFIED VOMITING TYPE: ICD-10-CM

## 2018-07-03 LAB
ALBUMIN SERPL BCP-MCNC: 4.5 G/DL (ref 3.2–4.9)
ALBUMIN/GLOB SERPL: 1.7 G/DL
ALP SERPL-CCNC: 109 U/L (ref 30–99)
ALT SERPL-CCNC: 15 U/L (ref 2–50)
ANION GAP SERPL CALC-SCNC: 13 MMOL/L (ref 0–11.9)
ANION GAP SERPL CALC-SCNC: 16 MMOL/L (ref 0–11.9)
ANION GAP SERPL CALC-SCNC: 20 MMOL/L (ref 0–11.9)
APPEARANCE UR: CLEAR
AST SERPL-CCNC: 14 U/L (ref 12–45)
BASOPHILS # BLD AUTO: 0.4 % (ref 0–1.8)
BASOPHILS # BLD: 0.03 K/UL (ref 0–0.12)
BILIRUB SERPL-MCNC: 0.5 MG/DL (ref 0.1–1.5)
BILIRUB UR QL STRIP.AUTO: NEGATIVE
BUN SERPL-MCNC: 14 MG/DL (ref 8–22)
BUN SERPL-MCNC: 17 MG/DL (ref 8–22)
BUN SERPL-MCNC: 20 MG/DL (ref 8–22)
CALCIUM SERPL-MCNC: 7.9 MG/DL (ref 8.5–10.5)
CALCIUM SERPL-MCNC: 8.8 MG/DL (ref 8.5–10.5)
CALCIUM SERPL-MCNC: 9.8 MG/DL (ref 8.5–10.5)
CHLORIDE SERPL-SCNC: 102 MMOL/L (ref 96–112)
CHLORIDE SERPL-SCNC: 108 MMOL/L (ref 96–112)
CHLORIDE SERPL-SCNC: 110 MMOL/L (ref 96–112)
CO2 SERPL-SCNC: 11 MMOL/L (ref 20–33)
CO2 SERPL-SCNC: 12 MMOL/L (ref 20–33)
CO2 SERPL-SCNC: 16 MMOL/L (ref 20–33)
COLOR UR: YELLOW
CREAT SERPL-MCNC: 0.48 MG/DL (ref 0.5–1.4)
CREAT SERPL-MCNC: 0.51 MG/DL (ref 0.5–1.4)
CREAT SERPL-MCNC: 0.75 MG/DL (ref 0.5–1.4)
EOSINOPHIL # BLD AUTO: 0.15 K/UL (ref 0–0.51)
EOSINOPHIL NFR BLD: 1.8 % (ref 0–6.9)
ERYTHROCYTE [DISTWIDTH] IN BLOOD BY AUTOMATED COUNT: 40.5 FL (ref 35.9–50)
GLOBULIN SER CALC-MCNC: 2.6 G/DL (ref 1.9–3.5)
GLUCOSE SERPL-MCNC: 135 MG/DL (ref 65–99)
GLUCOSE SERPL-MCNC: 384 MG/DL (ref 65–99)
GLUCOSE SERPL-MCNC: 445 MG/DL (ref 65–99)
GLUCOSE UR STRIP.AUTO-MCNC: >=1000 MG/DL
HCG UR QL: NEGATIVE
HCT VFR BLD AUTO: 39.9 % (ref 37–47)
HGB BLD-MCNC: 13.6 G/DL (ref 12–16)
IMM GRANULOCYTES # BLD AUTO: 0.05 K/UL (ref 0–0.11)
IMM GRANULOCYTES NFR BLD AUTO: 0.6 % (ref 0–0.9)
KETONES UR STRIP.AUTO-MCNC: >=160 MG/DL
LEUKOCYTE ESTERASE UR QL STRIP.AUTO: NEGATIVE
LIPASE SERPL-CCNC: 186 U/L (ref 11–82)
LYMPHOCYTES # BLD AUTO: 1.31 K/UL (ref 1–4.8)
LYMPHOCYTES NFR BLD: 15.7 % (ref 22–41)
MAGNESIUM SERPL-MCNC: 1.8 MG/DL (ref 1.5–2.5)
MCH RBC QN AUTO: 29.1 PG (ref 27–33)
MCHC RBC AUTO-ENTMCNC: 34.1 G/DL (ref 33.6–35)
MCV RBC AUTO: 85.4 FL (ref 81.4–97.8)
MICRO URNS: ABNORMAL
MONOCYTES # BLD AUTO: 0.49 K/UL (ref 0–0.85)
MONOCYTES NFR BLD AUTO: 5.9 % (ref 0–13.4)
NEUTROPHILS # BLD AUTO: 6.29 K/UL (ref 2–7.15)
NEUTROPHILS NFR BLD: 75.6 % (ref 44–72)
NITRITE UR QL STRIP.AUTO: NEGATIVE
NRBC # BLD AUTO: 0 K/UL
NRBC BLD-RTO: 0 /100 WBC
PH UR STRIP.AUTO: 5 [PH]
PHOSPHATE SERPL-MCNC: 3.4 MG/DL (ref 2.5–4.5)
PLATELET # BLD AUTO: 261 K/UL (ref 164–446)
PMV BLD AUTO: 10.9 FL (ref 9–12.9)
POTASSIUM SERPL-SCNC: 4 MMOL/L (ref 3.6–5.5)
POTASSIUM SERPL-SCNC: 4.5 MMOL/L (ref 3.6–5.5)
POTASSIUM SERPL-SCNC: 5.3 MMOL/L (ref 3.6–5.5)
PROT SERPL-MCNC: 7.1 G/DL (ref 6–8.2)
PROT UR QL STRIP: NEGATIVE MG/DL
RBC # BLD AUTO: 4.67 M/UL (ref 4.2–5.4)
RBC UR QL AUTO: NEGATIVE
SODIUM SERPL-SCNC: 134 MMOL/L (ref 135–145)
SODIUM SERPL-SCNC: 135 MMOL/L (ref 135–145)
SODIUM SERPL-SCNC: 139 MMOL/L (ref 135–145)
SP GR UR STRIP.AUTO: 1.04
UROBILINOGEN UR STRIP.AUTO-MCNC: 0.2 MG/DL
WBC # BLD AUTO: 8.3 K/UL (ref 4.8–10.8)

## 2018-07-03 PROCEDURE — 96375 TX/PRO/DX INJ NEW DRUG ADDON: CPT

## 2018-07-03 PROCEDURE — 83690 ASSAY OF LIPASE: CPT

## 2018-07-03 PROCEDURE — 99233 SBSQ HOSP IP/OBS HIGH 50: CPT | Performed by: INTERNAL MEDICINE

## 2018-07-03 PROCEDURE — 80053 COMPREHEN METABOLIC PANEL: CPT

## 2018-07-03 PROCEDURE — 700105 HCHG RX REV CODE 258: Performed by: EMERGENCY MEDICINE

## 2018-07-03 PROCEDURE — 82010 KETONE BODYS QUAN: CPT

## 2018-07-03 PROCEDURE — 700111 HCHG RX REV CODE 636 W/ 250 OVERRIDE (IP): Performed by: INTERNAL MEDICINE

## 2018-07-03 PROCEDURE — 99291 CRITICAL CARE FIRST HOUR: CPT

## 2018-07-03 PROCEDURE — 770022 HCHG ROOM/CARE - ICU (200)

## 2018-07-03 PROCEDURE — 96376 TX/PRO/DX INJ SAME DRUG ADON: CPT

## 2018-07-03 PROCEDURE — 83735 ASSAY OF MAGNESIUM: CPT

## 2018-07-03 PROCEDURE — 96374 THER/PROPH/DIAG INJ IV PUSH: CPT

## 2018-07-03 PROCEDURE — 84100 ASSAY OF PHOSPHORUS: CPT

## 2018-07-03 PROCEDURE — 700102 HCHG RX REV CODE 250 W/ 637 OVERRIDE(OP): Performed by: INTERNAL MEDICINE

## 2018-07-03 PROCEDURE — 700105 HCHG RX REV CODE 258: Performed by: INTERNAL MEDICINE

## 2018-07-03 PROCEDURE — 82962 GLUCOSE BLOOD TEST: CPT

## 2018-07-03 PROCEDURE — 81025 URINE PREGNANCY TEST: CPT

## 2018-07-03 PROCEDURE — A9270 NON-COVERED ITEM OR SERVICE: HCPCS | Performed by: INTERNAL MEDICINE

## 2018-07-03 PROCEDURE — 80048 BASIC METABOLIC PNL TOTAL CA: CPT

## 2018-07-03 PROCEDURE — 96361 HYDRATE IV INFUSION ADD-ON: CPT

## 2018-07-03 PROCEDURE — 81003 URINALYSIS AUTO W/O SCOPE: CPT

## 2018-07-03 PROCEDURE — 700111 HCHG RX REV CODE 636 W/ 250 OVERRIDE (IP): Performed by: EMERGENCY MEDICINE

## 2018-07-03 PROCEDURE — 85025 COMPLETE CBC W/AUTO DIFF WBC: CPT

## 2018-07-03 RX ORDER — SODIUM CHLORIDE 9 MG/ML
2000 INJECTION, SOLUTION INTRAVENOUS ONCE
Status: DISCONTINUED | OUTPATIENT
Start: 2018-07-03 | End: 2018-07-03

## 2018-07-03 RX ORDER — OMEPRAZOLE 20 MG/1
20 CAPSULE, DELAYED RELEASE ORAL DAILY
Status: DISCONTINUED | OUTPATIENT
Start: 2018-07-03 | End: 2018-07-07 | Stop reason: HOSPADM

## 2018-07-03 RX ORDER — TRAMADOL HYDROCHLORIDE 50 MG/1
50 TABLET ORAL EVERY 6 HOURS PRN
Status: DISCONTINUED | OUTPATIENT
Start: 2018-07-03 | End: 2018-07-07 | Stop reason: HOSPADM

## 2018-07-03 RX ORDER — POLYETHYLENE GLYCOL 3350 17 G/17G
1 POWDER, FOR SOLUTION ORAL
Status: DISCONTINUED | OUTPATIENT
Start: 2018-07-03 | End: 2018-07-07 | Stop reason: HOSPADM

## 2018-07-03 RX ORDER — SODIUM CHLORIDE 9 MG/ML
INJECTION, SOLUTION INTRAVENOUS CONTINUOUS
Status: DISCONTINUED | OUTPATIENT
Start: 2018-07-03 | End: 2018-07-03

## 2018-07-03 RX ORDER — DEXTROSE AND SODIUM CHLORIDE 5; .9 G/100ML; G/100ML
INJECTION, SOLUTION INTRAVENOUS CONTINUOUS
Status: DISCONTINUED | OUTPATIENT
Start: 2018-07-03 | End: 2018-07-03

## 2018-07-03 RX ORDER — SODIUM CHLORIDE 9 MG/ML
1000 INJECTION, SOLUTION INTRAVENOUS ONCE
Status: COMPLETED | OUTPATIENT
Start: 2018-07-03 | End: 2018-07-03

## 2018-07-03 RX ORDER — DEXTROSE AND SODIUM CHLORIDE 5; .9 G/100ML; G/100ML
INJECTION, SOLUTION INTRAVENOUS CONTINUOUS
Status: DISCONTINUED | OUTPATIENT
Start: 2018-07-04 | End: 2018-07-04

## 2018-07-03 RX ORDER — MORPHINE SULFATE 4 MG/ML
4 INJECTION, SOLUTION INTRAMUSCULAR; INTRAVENOUS ONCE
Status: COMPLETED | OUTPATIENT
Start: 2018-07-03 | End: 2018-07-03

## 2018-07-03 RX ORDER — BISACODYL 10 MG
10 SUPPOSITORY, RECTAL RECTAL
Status: DISCONTINUED | OUTPATIENT
Start: 2018-07-03 | End: 2018-07-07 | Stop reason: HOSPADM

## 2018-07-03 RX ORDER — SODIUM CHLORIDE 9 MG/ML
INJECTION, SOLUTION INTRAVENOUS CONTINUOUS
Status: DISCONTINUED | OUTPATIENT
Start: 2018-07-04 | End: 2018-07-04

## 2018-07-03 RX ORDER — ATORVASTATIN CALCIUM 20 MG/1
20 TABLET, FILM COATED ORAL DAILY
Status: DISCONTINUED | OUTPATIENT
Start: 2018-07-03 | End: 2018-07-04

## 2018-07-03 RX ORDER — INSULIN GLARGINE 100 [IU]/ML
33 INJECTION, SOLUTION SUBCUTANEOUS
Status: DISCONTINUED | OUTPATIENT
Start: 2018-07-03 | End: 2018-07-03

## 2018-07-03 RX ORDER — MAGNESIUM SULFATE HEPTAHYDRATE 40 MG/ML
4 INJECTION, SOLUTION INTRAVENOUS
Status: DISCONTINUED | OUTPATIENT
Start: 2018-07-03 | End: 2018-07-03

## 2018-07-03 RX ORDER — METOCLOPRAMIDE 5 MG/1
10 TABLET ORAL
Status: DISCONTINUED | OUTPATIENT
Start: 2018-07-03 | End: 2018-07-07 | Stop reason: HOSPADM

## 2018-07-03 RX ORDER — MAGNESIUM SULFATE 1 G/100ML
1 INJECTION INTRAVENOUS
Status: COMPLETED | OUTPATIENT
Start: 2018-07-03 | End: 2018-07-03

## 2018-07-03 RX ORDER — MAGNESIUM SULFATE HEPTAHYDRATE 40 MG/ML
4 INJECTION, SOLUTION INTRAVENOUS
Status: DISCONTINUED | OUTPATIENT
Start: 2018-07-03 | End: 2018-07-04

## 2018-07-03 RX ORDER — GABAPENTIN 100 MG/1
100 CAPSULE ORAL 2 TIMES DAILY
Status: DISCONTINUED | OUTPATIENT
Start: 2018-07-03 | End: 2018-07-07 | Stop reason: HOSPADM

## 2018-07-03 RX ORDER — FERROUS SULFATE 325(65) MG
325 TABLET ORAL DAILY
Status: DISCONTINUED | OUTPATIENT
Start: 2018-07-03 | End: 2018-07-07 | Stop reason: HOSPADM

## 2018-07-03 RX ORDER — AMOXICILLIN 250 MG
2 CAPSULE ORAL 2 TIMES DAILY
Status: DISCONTINUED | OUTPATIENT
Start: 2018-07-03 | End: 2018-07-07 | Stop reason: HOSPADM

## 2018-07-03 RX ORDER — MAGNESIUM SULFATE HEPTAHYDRATE 40 MG/ML
2 INJECTION, SOLUTION INTRAVENOUS
Status: DISCONTINUED | OUTPATIENT
Start: 2018-07-03 | End: 2018-07-03

## 2018-07-03 RX ORDER — DEXTROSE AND SODIUM CHLORIDE 10; .45 G/100ML; G/100ML
INJECTION, SOLUTION INTRAVENOUS CONTINUOUS
Status: DISCONTINUED | OUTPATIENT
Start: 2018-07-03 | End: 2018-07-03

## 2018-07-03 RX ORDER — SUCRALFATE ORAL 1 G/10ML
1 SUSPENSION ORAL EVERY 6 HOURS
Status: DISCONTINUED | OUTPATIENT
Start: 2018-07-03 | End: 2018-07-07 | Stop reason: HOSPADM

## 2018-07-03 RX ORDER — ONDANSETRON 2 MG/ML
4 INJECTION INTRAMUSCULAR; INTRAVENOUS EVERY 4 HOURS PRN
Status: DISCONTINUED | OUTPATIENT
Start: 2018-07-03 | End: 2018-07-07 | Stop reason: HOSPADM

## 2018-07-03 RX ORDER — INSULIN GLARGINE 100 [IU]/ML
33 INJECTION, SOLUTION SUBCUTANEOUS 2 TIMES DAILY
Status: DISCONTINUED | OUTPATIENT
Start: 2018-07-03 | End: 2018-07-03

## 2018-07-03 RX ORDER — ONDANSETRON 4 MG/1
4 TABLET, ORALLY DISINTEGRATING ORAL EVERY 4 HOURS PRN
Status: DISCONTINUED | OUTPATIENT
Start: 2018-07-03 | End: 2018-07-07 | Stop reason: HOSPADM

## 2018-07-03 RX ORDER — INSULIN GLARGINE 100 [IU]/ML
30 INJECTION, SOLUTION SUBCUTANEOUS 2 TIMES DAILY
Status: ON HOLD | COMMUNITY
End: 2019-01-23

## 2018-07-03 RX ORDER — DEXTROSE MONOHYDRATE 25 G/50ML
25 INJECTION, SOLUTION INTRAVENOUS
Status: DISCONTINUED | OUTPATIENT
Start: 2018-07-03 | End: 2018-07-03

## 2018-07-03 RX ORDER — DEXTROSE AND SODIUM CHLORIDE 10; .45 G/100ML; G/100ML
INJECTION, SOLUTION INTRAVENOUS CONTINUOUS
Status: DISPENSED | OUTPATIENT
Start: 2018-07-04 | End: 2018-07-04

## 2018-07-03 RX ORDER — OLANZAPINE 5 MG/1
5 TABLET ORAL EVERY EVENING
Status: DISCONTINUED | OUTPATIENT
Start: 2018-07-03 | End: 2018-07-07 | Stop reason: HOSPADM

## 2018-07-03 RX ORDER — MAGNESIUM SULFATE HEPTAHYDRATE 40 MG/ML
2 INJECTION, SOLUTION INTRAVENOUS
Status: DISCONTINUED | OUTPATIENT
Start: 2018-07-03 | End: 2018-07-04

## 2018-07-03 RX ORDER — METOCLOPRAMIDE HYDROCHLORIDE 5 MG/ML
10 INJECTION INTRAMUSCULAR; INTRAVENOUS ONCE
Status: COMPLETED | OUTPATIENT
Start: 2018-07-03 | End: 2018-07-03

## 2018-07-03 RX ORDER — POTASSIUM CHLORIDE 7.45 MG/ML
10 INJECTION INTRAVENOUS ONCE
Status: COMPLETED | OUTPATIENT
Start: 2018-07-03 | End: 2018-07-03

## 2018-07-03 RX ORDER — HEPARIN SODIUM 5000 [USP'U]/ML
5000 INJECTION, SOLUTION INTRAVENOUS; SUBCUTANEOUS EVERY 8 HOURS
Status: DISCONTINUED | OUTPATIENT
Start: 2018-07-03 | End: 2018-07-04

## 2018-07-03 RX ORDER — SODIUM CHLORIDE 9 MG/ML
2000 INJECTION, SOLUTION INTRAVENOUS ONCE
Status: COMPLETED | OUTPATIENT
Start: 2018-07-04 | End: 2018-07-04

## 2018-07-03 RX ADMIN — METOCLOPRAMIDE 10 MG: 5 INJECTION, SOLUTION INTRAMUSCULAR; INTRAVENOUS at 13:00

## 2018-07-03 RX ADMIN — MORPHINE SULFATE 4 MG: 4 INJECTION INTRAVENOUS at 16:15

## 2018-07-03 RX ADMIN — HEPARIN SODIUM 5000 UNITS: 5000 INJECTION, SOLUTION INTRAVENOUS; SUBCUTANEOUS at 20:52

## 2018-07-03 RX ADMIN — MORPHINE SULFATE 4 MG: 4 INJECTION INTRAVENOUS at 13:00

## 2018-07-03 RX ADMIN — MAGNESIUM SULFATE 1 G: 1 INJECTION INTRAVENOUS at 18:31

## 2018-07-03 RX ADMIN — PROCHLORPERAZINE EDISYLATE 10 MG: 5 INJECTION INTRAMUSCULAR; INTRAVENOUS at 20:52

## 2018-07-03 RX ADMIN — HEPARIN SODIUM 5000 UNITS: 5000 INJECTION, SOLUTION INTRAVENOUS; SUBCUTANEOUS at 17:36

## 2018-07-03 RX ADMIN — OLANZAPINE 5 MG: 5 TABLET, FILM COATED ORAL at 21:00

## 2018-07-03 RX ADMIN — SODIUM CHLORIDE: 9 INJECTION, SOLUTION INTRAVENOUS at 19:49

## 2018-07-03 RX ADMIN — SODIUM CHLORIDE 1000 ML: 9 INJECTION, SOLUTION INTRAVENOUS at 14:45

## 2018-07-03 RX ADMIN — PROCHLORPERAZINE EDISYLATE 10 MG: 5 INJECTION INTRAMUSCULAR; INTRAVENOUS at 16:30

## 2018-07-03 RX ADMIN — GABAPENTIN 100 MG: 100 CAPSULE ORAL at 21:00

## 2018-07-03 RX ADMIN — POTASSIUM CHLORIDE 10 MEQ: 7.46 INJECTION, SOLUTION INTRAVENOUS at 19:50

## 2018-07-03 RX ADMIN — SUCRALFATE 1 G: 1 SUSPENSION ORAL at 23:13

## 2018-07-03 RX ADMIN — SODIUM CHLORIDE 1000 ML: 9 INJECTION, SOLUTION INTRAVENOUS at 13:00

## 2018-07-03 RX ADMIN — INSULIN GLARGINE 33 UNITS: 100 INJECTION, SOLUTION SUBCUTANEOUS at 22:07

## 2018-07-03 ASSESSMENT — ENCOUNTER SYMPTOMS
DIZZINESS: 0
PALPITATIONS: 0
FEVER: 0
SHORTNESS OF BREATH: 0
CHILLS: 0
NECK PAIN: 0
NAUSEA: 1
VOMITING: 1
MYALGIAS: 0
COUGH: 0

## 2018-07-03 ASSESSMENT — LIFESTYLE VARIABLES: EVER_SMOKED: NEVER

## 2018-07-03 ASSESSMENT — PAIN SCALES - GENERAL
PAINLEVEL_OUTOF10: 4
PAINLEVEL_OUTOF10: 6
PAINLEVEL_OUTOF10: 4
PAINLEVEL_OUTOF10: 9

## 2018-07-03 ASSESSMENT — COPD QUESTIONNAIRES
COPD SCREENING SCORE: 0
HAVE YOU SMOKED AT LEAST 100 CIGARETTES IN YOUR ENTIRE LIFE: NO/DON'T KNOW
DURING THE PAST 4 WEEKS HOW MUCH DID YOU FEEL SHORT OF BREATH: NONE/LITTLE OF THE TIME
DO YOU EVER COUGH UP ANY MUCUS OR PHLEGM?: NO/ONLY WITH OCCASIONAL COLDS OR INFECTIONS

## 2018-07-03 NOTE — ED TRIAGE NOTES
Patient states that mid abdominal pain and nausea began this am. Patient has hx of diabetes and according to EMS glucose read above 500. Patient states she is compliant with meds. Patient also has hx of tachycardia with 140-160 heart rate.

## 2018-07-03 NOTE — ED PROVIDER NOTES
ED Provider Note    CHIEF COMPLAINT  Chief Complaint   Patient presents with   • Abdominal Pain     mid abdominal pain sicne this am   • Nausea       HPI  Alis Sparks is a 27 y.o. female who presents for evaluation of abdominal pain and vomiting.  The patient was just discharged from the hospital 4 days ago after an extended admission for abdominal pain and vomiting.  She is a diabetic with gastroparesis.  She was sent home on a PPI as well as Reglan and tramadol.  She states she woke this morning and was having severe epigastric abdominal pain and uncontrolled vomiting was not able to take any of her medicines.  She has had no fevers.  She has had no diarrhea.  She denies any urinary symptoms.  She states her blood sugars have been running high.  Patient states she feels a bit dehydrated.    REVIEW OF SYSTEMS  See HPI for further details. All other systems negative.    PAST MEDICAL HISTORY  Past Medical History:   Diagnosis Date   • Backpain    • Bronchitis    • Diabetes type 1, controlled (HCC)     tests 4-5 times daily   • DKA (diabetic ketoacidoses) (Formerly [facility])    • Fall    • Gastroparesis    • Kidney infection    • Pneumonia    • UTI (urinary tract infection)        FAMILY HISTORY  Family History   Problem Relation Age of Onset   • Cancer       breasts, multiple family members   • Hypertension Maternal Grandfather        SOCIAL HISTORY  Social History     Social History   • Marital status: Single     Spouse name: N/A   • Number of children: N/A   • Years of education: N/A     Social History Main Topics   • Smoking status: Never Smoker   • Smokeless tobacco: Never Used   • Alcohol use No   • Drug use: No   • Sexual activity: No     Other Topics Concern   • Not on file     Social History Narrative   • No narrative on file       SURGICAL HISTORY  Past Surgical History:   Procedure Laterality Date   • GASTROSCOPY-ENDO N/A 6/9/2018    Procedure: GASTROSCOPY-ENDO WITH BIOPSIES AND DIALATION;  Surgeon: Marino GE  "BRIGIDA Black;  [place] [facility] [place]  Service: Gastroenterology   • SUBMANDIBLE ABSCESS INCISION AND DRAINAGE Left 11/8/2017    Procedure: SUBMANDIBLE ABSCESS INCISION AND DRAINAGE;  Surgeon: Osman Young M.D.;  [place] [facility] [place]  Service: [place]   • DENTAL EXTRACTION(S)  11/8/2017    Procedure: DENTAL EXTRACTION(S);  Surgeon: Osman Young M.D.;  [place] [facility] [place]  Service: Dental   • GASTROSCOPY-ENDO  9/18/2014    Performed by Sylvain PERRY M.D. at ENDOSCOPY [facility]   • GASTROSCOPY WITH BIOPSY  9/18/2014    Performed by Sylvain PERRY M.D. at ENDOSCOPY [facility]   • OTHER      surgery to patch lung after pneumor from central line placement       CURRENT MEDICATIONS  Home Medications    **Home medications have not yet been reviewed for this encounter**         ALLERGIES  Allergies   Allergen Reactions   • Pcn [Penicillins] Shortness of Breath and Swelling     Per patient's Mom, patient tolerates Keflex   • Lisinopril Unspecified     Per historical: Reported pedal swelling. No facial/angioedema or rash nor respiratory symptoms.    • Promethazine Vomiting       PHYSICAL EXAM  VITAL SIGNS: /75   Pulse 63   Resp 18   Ht 1.651 m (5' 5\")   Wt 74.7 kg (164 lb 10.9 oz)   LMP 06/03/2018   SpO2 100%   BMI 27.40 kg/m²   Constitutional: Well developed, Well nourished, No acute distress, Non-toxic appearance.   HENT: Normocephalic, Atraumatic, Oropharynx somewhat dry.  Eyes:  EOMI, Conjunctiva normal, No discharge.   Cardiovascular: Tachycardic, Normal rhythm, No murmurs, No rubs, No gallops.   Thorax & Lungs: Clear to auscultation without wheezes, rales, or rhonchi.    Abdomen: Bowel sounds normal, Soft, epigastric tenderness, No masses, No pulsatile masses.   Skin: Warm, Dry.  Musculoskeletal: Good range of motion in all major joints.    Neurologic: Alert, No focal deficits noted.           COURSE & MEDICAL DECISION MAKING  Pertinent Labs & Imaging " studies reviewed. (See chart for details)  This is a 27-year-old here for evaluation of abdominal pain and vomiting.  She was just discharged from the hospital 5 days ago.  She is a type I diabetic.  She started having epigastric pain and vomiting this morning.  She does not believe she has had any fevers associated with this.  On arrival she is afebrile.  Based on her history of diabetes and vomiting an IV is established and she immediately is hydrated with 1 L of normal saline.  Laboratories are obtained to include chemistries that include a glucose of 445 with a bicarb of 12 consistent with DKA.  At that point a second liter of normal saline is ordered.  She is also treated with morphine and Reglan with improvement.  She has had no recurrent vomiting and her pain is much improved.  Interestingly her lipase is also elevated at 186 suggesting that she has some pancreatitis as well.  CBC is unremarkable.  I discussed the results of the studies with the patient.  At this point she will require acute hospitalization for further evaluation and treatment.  I discussed the case with the hospitalist and Dr. Mesa will be the admitting physician.  The patient is feeling better after her hydration with normal saline which is routine medical treatment for diabetic ketoacidosis.    FINAL IMPRESSION  1.  DKA  2.  Nausea and vomiting  3.  Pancreatitis         Electronically signed by: Luc Walton, 7/3/2018 12:39 PM

## 2018-07-04 PROBLEM — E87.1 HYPONATREMIA: Status: RESOLVED | Noted: 2017-09-20 | Resolved: 2018-07-04

## 2018-07-04 LAB
ANION GAP SERPL CALC-SCNC: 13 MMOL/L (ref 0–11.9)
ANION GAP SERPL CALC-SCNC: 14 MMOL/L (ref 0–11.9)
ANION GAP SERPL CALC-SCNC: 7 MMOL/L (ref 0–11.9)
ANION GAP SERPL CALC-SCNC: 9 MMOL/L (ref 0–11.9)
B-OH-BUTYR SERPL-MCNC: 3.78 MMOL/L (ref 0.02–0.27)
BUN SERPL-MCNC: 10 MG/DL (ref 8–22)
BUN SERPL-MCNC: 6 MG/DL (ref 8–22)
BUN SERPL-MCNC: 8 MG/DL (ref 8–22)
BUN SERPL-MCNC: 8 MG/DL (ref 8–22)
CALCIUM SERPL-MCNC: 7.7 MG/DL (ref 8.5–10.5)
CALCIUM SERPL-MCNC: 8.4 MG/DL (ref 8.5–10.5)
CALCIUM SERPL-MCNC: 8.5 MG/DL (ref 8.5–10.5)
CALCIUM SERPL-MCNC: 8.5 MG/DL (ref 8.5–10.5)
CHLORIDE SERPL-SCNC: 108 MMOL/L (ref 96–112)
CHLORIDE SERPL-SCNC: 111 MMOL/L (ref 96–112)
CHLORIDE SERPL-SCNC: 113 MMOL/L (ref 96–112)
CHLORIDE SERPL-SCNC: 113 MMOL/L (ref 96–112)
CO2 SERPL-SCNC: 11 MMOL/L (ref 20–33)
CO2 SERPL-SCNC: 15 MMOL/L (ref 20–33)
CO2 SERPL-SCNC: 18 MMOL/L (ref 20–33)
CO2 SERPL-SCNC: 9 MMOL/L (ref 20–33)
CREAT SERPL-MCNC: 0.36 MG/DL (ref 0.5–1.4)
CREAT SERPL-MCNC: 0.43 MG/DL (ref 0.5–1.4)
CREAT SERPL-MCNC: 0.44 MG/DL (ref 0.5–1.4)
CREAT SERPL-MCNC: 0.49 MG/DL (ref 0.5–1.4)
GLUCOSE BLD-MCNC: 101 MG/DL (ref 65–99)
GLUCOSE BLD-MCNC: 105 MG/DL (ref 65–99)
GLUCOSE BLD-MCNC: 107 MG/DL (ref 65–99)
GLUCOSE BLD-MCNC: 113 MG/DL (ref 65–99)
GLUCOSE BLD-MCNC: 117 MG/DL (ref 65–99)
GLUCOSE BLD-MCNC: 130 MG/DL (ref 65–99)
GLUCOSE BLD-MCNC: 132 MG/DL (ref 65–99)
GLUCOSE BLD-MCNC: 136 MG/DL (ref 65–99)
GLUCOSE BLD-MCNC: 162 MG/DL (ref 65–99)
GLUCOSE BLD-MCNC: 185 MG/DL (ref 65–99)
GLUCOSE BLD-MCNC: 205 MG/DL (ref 65–99)
GLUCOSE BLD-MCNC: 220 MG/DL (ref 65–99)
GLUCOSE BLD-MCNC: 291 MG/DL (ref 65–99)
GLUCOSE BLD-MCNC: 344 MG/DL (ref 65–99)
GLUCOSE BLD-MCNC: 353 MG/DL (ref 65–99)
GLUCOSE BLD-MCNC: 96 MG/DL (ref 65–99)
GLUCOSE SERPL-MCNC: 103 MG/DL (ref 65–99)
GLUCOSE SERPL-MCNC: 121 MG/DL (ref 65–99)
GLUCOSE SERPL-MCNC: 146 MG/DL (ref 65–99)
GLUCOSE SERPL-MCNC: 305 MG/DL (ref 65–99)
MAGNESIUM SERPL-MCNC: 1.8 MG/DL (ref 1.5–2.5)
MAGNESIUM SERPL-MCNC: 2 MG/DL (ref 1.5–2.5)
MAGNESIUM SERPL-MCNC: 2.3 MG/DL (ref 1.5–2.5)
PHOSPHATE SERPL-MCNC: 2 MG/DL (ref 2.5–4.5)
PHOSPHATE SERPL-MCNC: 2.1 MG/DL (ref 2.5–4.5)
PHOSPHATE SERPL-MCNC: 2.7 MG/DL (ref 2.5–4.5)
POTASSIUM SERPL-SCNC: 3.8 MMOL/L (ref 3.6–5.5)
POTASSIUM SERPL-SCNC: 4 MMOL/L (ref 3.6–5.5)
POTASSIUM SERPL-SCNC: 4.5 MMOL/L (ref 3.6–5.5)
POTASSIUM SERPL-SCNC: 4.9 MMOL/L (ref 3.6–5.5)
SODIUM SERPL-SCNC: 133 MMOL/L (ref 135–145)
SODIUM SERPL-SCNC: 135 MMOL/L (ref 135–145)
SODIUM SERPL-SCNC: 135 MMOL/L (ref 135–145)
SODIUM SERPL-SCNC: 138 MMOL/L (ref 135–145)

## 2018-07-04 PROCEDURE — 82962 GLUCOSE BLOOD TEST: CPT | Mod: 91

## 2018-07-04 PROCEDURE — 700105 HCHG RX REV CODE 258: Performed by: INTERNAL MEDICINE

## 2018-07-04 PROCEDURE — A9270 NON-COVERED ITEM OR SERVICE: HCPCS | Performed by: INTERNAL MEDICINE

## 2018-07-04 PROCEDURE — 83735 ASSAY OF MAGNESIUM: CPT

## 2018-07-04 PROCEDURE — 700102 HCHG RX REV CODE 250 W/ 637 OVERRIDE(OP): Performed by: INTERNAL MEDICINE

## 2018-07-04 PROCEDURE — 80048 BASIC METABOLIC PNL TOTAL CA: CPT

## 2018-07-04 PROCEDURE — 700102 HCHG RX REV CODE 250 W/ 637 OVERRIDE(OP): Performed by: HOSPITALIST

## 2018-07-04 PROCEDURE — 700111 HCHG RX REV CODE 636 W/ 250 OVERRIDE (IP): Performed by: INTERNAL MEDICINE

## 2018-07-04 PROCEDURE — 700111 HCHG RX REV CODE 636 W/ 250 OVERRIDE (IP): Performed by: HOSPITALIST

## 2018-07-04 PROCEDURE — 700105 HCHG RX REV CODE 258: Performed by: HOSPITALIST

## 2018-07-04 PROCEDURE — A9270 NON-COVERED ITEM OR SERVICE: HCPCS | Performed by: HOSPITALIST

## 2018-07-04 PROCEDURE — 770022 HCHG ROOM/CARE - ICU (200)

## 2018-07-04 PROCEDURE — 84100 ASSAY OF PHOSPHORUS: CPT

## 2018-07-04 PROCEDURE — 99291 CRITICAL CARE FIRST HOUR: CPT | Performed by: HOSPITALIST

## 2018-07-04 PROCEDURE — 700101 HCHG RX REV CODE 250: Performed by: HOSPITALIST

## 2018-07-04 RX ORDER — DEXTROSE MONOHYDRATE 25 G/50ML
25 INJECTION, SOLUTION INTRAVENOUS
Status: DISCONTINUED | OUTPATIENT
Start: 2018-07-04 | End: 2018-07-07 | Stop reason: HOSPADM

## 2018-07-04 RX ORDER — INSULIN GLARGINE 100 [IU]/ML
20 INJECTION, SOLUTION SUBCUTANEOUS ONCE
Status: COMPLETED | OUTPATIENT
Start: 2018-07-04 | End: 2018-07-04

## 2018-07-04 RX ORDER — INSULIN GLARGINE 100 [IU]/ML
20 INJECTION, SOLUTION SUBCUTANEOUS 2 TIMES DAILY
Status: DISCONTINUED | OUTPATIENT
Start: 2018-07-05 | End: 2018-07-06

## 2018-07-04 RX ORDER — POTASSIUM CHLORIDE 7.45 MG/ML
10 INJECTION INTRAVENOUS
Status: DISCONTINUED | OUTPATIENT
Start: 2018-07-04 | End: 2018-07-04

## 2018-07-04 RX ORDER — POTASSIUM CHLORIDE 7.45 MG/ML
10 INJECTION INTRAVENOUS
Status: COMPLETED | OUTPATIENT
Start: 2018-07-04 | End: 2018-07-04

## 2018-07-04 RX ORDER — SODIUM CHLORIDE AND POTASSIUM CHLORIDE 150; 900 MG/100ML; MG/100ML
INJECTION, SOLUTION INTRAVENOUS CONTINUOUS
Status: DISCONTINUED | OUTPATIENT
Start: 2018-07-04 | End: 2018-07-07 | Stop reason: HOSPADM

## 2018-07-04 RX ORDER — POTASSIUM CHLORIDE 7.45 MG/ML
10 INJECTION INTRAVENOUS ONCE
Status: COMPLETED | OUTPATIENT
Start: 2018-07-04 | End: 2018-07-04

## 2018-07-04 RX ORDER — ACETAMINOPHEN 325 MG/1
650 TABLET ORAL EVERY 6 HOURS PRN
Status: DISCONTINUED | OUTPATIENT
Start: 2018-07-04 | End: 2018-07-07 | Stop reason: HOSPADM

## 2018-07-04 RX ADMIN — DEXTROSE AND SODIUM CHLORIDE: 5; 900 INJECTION, SOLUTION INTRAVENOUS at 00:45

## 2018-07-04 RX ADMIN — OLANZAPINE 5 MG: 5 TABLET, FILM COATED ORAL at 21:27

## 2018-07-04 RX ADMIN — POTASSIUM CHLORIDE 10 MEQ: 7.46 INJECTION, SOLUTION INTRAVENOUS at 07:56

## 2018-07-04 RX ADMIN — SUCRALFATE 1 G: 1 SUSPENSION ORAL at 23:43

## 2018-07-04 RX ADMIN — PROCHLORPERAZINE EDISYLATE 10 MG: 5 INJECTION INTRAMUSCULAR; INTRAVENOUS at 08:20

## 2018-07-04 RX ADMIN — INSULIN GLARGINE 20 UNITS: 100 INJECTION, SOLUTION SUBCUTANEOUS at 15:41

## 2018-07-04 RX ADMIN — METOCLOPRAMIDE HYDROCHLORIDE 10 MG: 5 TABLET ORAL at 17:07

## 2018-07-04 RX ADMIN — SODIUM CHLORIDE 2000 ML: 9 INJECTION, SOLUTION INTRAVENOUS at 00:35

## 2018-07-04 RX ADMIN — POTASSIUM CHLORIDE 10 MEQ: 7.46 INJECTION, SOLUTION INTRAVENOUS at 11:25

## 2018-07-04 RX ADMIN — SODIUM CHLORIDE 3 UNITS/HR: 9 INJECTION, SOLUTION INTRAVENOUS at 00:35

## 2018-07-04 RX ADMIN — ENOXAPARIN SODIUM 40 MG: 100 INJECTION SUBCUTANEOUS at 15:41

## 2018-07-04 RX ADMIN — SUCRALFATE 1 G: 1 SUSPENSION ORAL at 05:57

## 2018-07-04 RX ADMIN — HEPARIN SODIUM 5000 UNITS: 5000 INJECTION, SOLUTION INTRAVENOUS; SUBCUTANEOUS at 05:57

## 2018-07-04 RX ADMIN — ONDANSETRON 4 MG: 2 INJECTION INTRAMUSCULAR; INTRAVENOUS at 05:57

## 2018-07-04 RX ADMIN — POTASSIUM CHLORIDE 10 MEQ: 7.46 INJECTION, SOLUTION INTRAVENOUS at 03:23

## 2018-07-04 RX ADMIN — POTASSIUM CHLORIDE 10 MEQ: 7.46 INJECTION, SOLUTION INTRAVENOUS at 00:35

## 2018-07-04 RX ADMIN — POTASSIUM CHLORIDE 10 MEQ: 7.46 INJECTION, SOLUTION INTRAVENOUS at 10:22

## 2018-07-04 RX ADMIN — HEPARIN SODIUM 5000 UNITS: 5000 INJECTION, SOLUTION INTRAVENOUS; SUBCUTANEOUS at 13:31

## 2018-07-04 RX ADMIN — POTASSIUM CHLORIDE AND SODIUM CHLORIDE: 900; 150 INJECTION, SOLUTION INTRAVENOUS at 17:48

## 2018-07-04 RX ADMIN — SUCRALFATE 1 G: 1 SUSPENSION ORAL at 17:15

## 2018-07-04 RX ADMIN — DEXTROSE AND SODIUM CHLORIDE: 10; .45 INJECTION, SOLUTION INTRAVENOUS at 17:07

## 2018-07-04 RX ADMIN — ACETAMINOPHEN 650 MG: 325 TABLET, FILM COATED ORAL at 21:27

## 2018-07-04 RX ADMIN — POTASSIUM CHLORIDE 10 MEQ: 7.46 INJECTION, SOLUTION INTRAVENOUS at 14:30

## 2018-07-04 RX ADMIN — DEXTROSE AND SODIUM CHLORIDE: 10; .45 INJECTION, SOLUTION INTRAVENOUS at 06:08

## 2018-07-04 RX ADMIN — GABAPENTIN 100 MG: 100 CAPSULE ORAL at 21:27

## 2018-07-04 ASSESSMENT — ENCOUNTER SYMPTOMS
VOMITING: 1
FEVER: 0
NERVOUS/ANXIOUS: 1
CHILLS: 0
ABDOMINAL PAIN: 1

## 2018-07-04 ASSESSMENT — PAIN SCALES - GENERAL
PAINLEVEL_OUTOF10: 4

## 2018-07-04 NOTE — CARE PLAN
Problem: Safety  Goal: Will remain free from injury  Outcome: PROGRESSING AS EXPECTED  Treaded socks on, bed in lowest position, personal belongings within reach, patient educated to use call light, and lower bed rails up.     Problem: Venous Thromboembolism (VTW)/Deep Vein Thrombosis (DVT) Prevention:  Goal: Patient will participate in Venous Thrombosis (VTE)/Deep Vein Thrombosis (DVT)Prevention Measures  Outcome: PROGRESSING AS EXPECTED  Patient is receiving heparin but has refused SCDs.

## 2018-07-04 NOTE — DISCHARGE PLANNING
Medical SW    Sw attended AM IDT Rounds.    RN reports, pt admit yesterday for DKA, A/O x4, lethargic, nausea and vomiting, no lisa,       Plan: Sw to assist w/ d/c planning as needed.

## 2018-07-04 NOTE — ASSESSMENT & PLAN NOTE
She has been followed [facility]. S/p gastric emptying study in 2014 was negative.   - GI evaluated and has signed off  - scheduled reglan  - patient considering J tube placement but anxious

## 2018-07-04 NOTE — PROGRESS NOTES
Spoke with Moshe about pts current status. Dr. Mesa does not want to do insulin drip since BS was on lower end and AG 13, with CO2 16. Starting pt on home insulin

## 2018-07-04 NOTE — ASSESSMENT & PLAN NOTE
Recurrent DKA. A1C 8.8%. Home regimen is glargine 33U BID and a sliding scale. Gap has since closed and acidosis resolved.  - s/p ICU for IV insulin drip, IV fluids, electrolyte repletion  - increase lantus to 25U BID  - sliding scale  - DM diet, advanced as tolerated clears --> regular

## 2018-07-04 NOTE — H&P
Hospital Medicine History & Physical Note    Date of Service  7/3/2018    Primary Care Physician  Navi Huber M.D.    Consultants  None     Code Status  Full code     Chief Complaint  Nausea and vomiting     History of Presenting Illness  27 y.o. female with past medical history of poorly controlled type I diabetes mellitus complicated with gastroparesis and multiple admissions for DKA with the most recent one was this month came in with intractable nausea and vomiting. She was just discharged 5 days ago after presenting to the hospital for same issue and found to be on DKA. Now here in ER found again to be on DKA.   Unclear about her compliance to medication, pt states has been taking her insulin regularly and has not missed a dose.  Denies any diarrhea, chest pain, shortness of breath.  Pt will be admitted to ICU on DKA protocol.     Review of Systems  Review of Systems   Constitutional: Negative for chills and fever.   HENT: Negative for hearing loss.    Respiratory: Negative for cough and shortness of breath.    Cardiovascular: Negative for chest pain and palpitations.   Gastrointestinal: Positive for nausea and vomiting.   Genitourinary: Negative for dysuria and urgency.   Musculoskeletal: Negative for myalgias and neck pain.   Skin: Negative for rash.   Neurological: Negative for dizziness.       Past Medical History   has a past medical history of Backpain; Bronchitis; Diabetes type 1, controlled (HCC); DKA (diabetic ketoacidoses) (HCC); Fall; Gastroparesis; Kidney infection; Pneumonia; and UTI (urinary tract infection).    Surgical History   has a past surgical history that includes gastroscopy-endo (9/18/2014); gastroscopy with biopsy (9/18/2014); other; submandible abscess incision and drainage (Left, 11/8/2017); dental extraction(s) (11/8/2017); and gastroscopy-endo (N/A, 6/9/2018).     Family History  family history includes Hypertension in her maternal grandfather.     Social History    reports that she has never smoked. She has never used smokeless tobacco. She reports that she does not drink alcohol or use drugs.    Allergies  Allergies   Allergen Reactions   • Pcn [Penicillins] Shortness of Breath and Swelling     Per patient's Mom, patient tolerates Keflex   • Lisinopril Unspecified     Per historical: Reported pedal swelling. No facial/angioedema or rash nor respiratory symptoms.    • Promethazine Vomiting       Medications  Prior to Admission Medications   Prescriptions Last Dose Informant Patient Reported? Taking?   Cholecalciferol 2000 UNIT Cap 7/2/2018 at 1000 Patient Yes No   Sig: Take 1 Cap by mouth every day.   Insulin Glargine (BASAGLAR KWIKPEN) 100 UNIT/ML Solution Pen-injector 7/3/2018 at 0900 Patient Yes Yes   Sig: Inject 33 Units as instructed 2 Times a Day.   OLANZapine (ZYPREXA) 5 MG Tab 7/2/2018 at 1800 Patient No No   Sig: Take 1 Tab by mouth every evening.   atorvastatin (LIPITOR) 20 MG Tab 7/2/2018 at 1000 Patient No No   Sig: Take 1 Tab by mouth every day.   ferrous sulfate 325 (65 Fe) MG tablet 7/2/2018 at 1000 Patient Yes No   Sig: Take 325 mg by mouth every day.   gabapentin (NEURONTIN) 100 MG Cap 7/2/2018 at 1830 Patient No No   Sig: Take 1 Cap by mouth 2 Times a Day.   ibuprofen (MOTRIN) 400 MG Tab 7/2/2018 at 2100 Patient No No   Sig: Take 1 Tab by mouth every 6 hours as needed (pain).   insulin glulisine (APIDRA) 100 UNIT/ML Solution Pen-injector injection 7/3/2018 at 0900 Patient Yes Yes   Sig: Inject 2-10 Units as instructed 3 times a day, with meals. FSBS base 150  For every 50 increase in blood sugar insulin doubles  200 = 2 units  250 = 4 units  300 = 8 units  350 =  16 units  400 = 32 units   metoclopramide (REGLAN) 10 MG/10ML Solution 7/2/2018 at 1800 Patient Yes No   Sig: Take 10 mg by mouth 3 times a day before meals.   omeprazole (PRILOSEC) 20 MG delayed-release capsule 7/2/2018 at 0900 Patient No No   Sig: Take 1 Cap by mouth every day.   senna-docusate  (PERICOLACE OR SENOKOT S) 8.6-50 MG Tab 7/2/2018 at 1800 Patient No No   Sig: Take 2 Tabs by mouth 2 Times a Day.   sucralfate (CARAFATE) 1 GM/10ML Suspension 7/2/2018 at 2100 Patient No No   Sig: Take 10 mL by mouth every 6 hours.   tramadol (ULTRAM) 50 MG Tab 7/2/2018 at 1930 Patient No No   Sig: Take 1 Tab by mouth every 6 hours as needed for up to 5 days.      Facility-Administered Medications: None       Physical Exam  Blood Pressure: 102/75       Pulse: (!) 109   Respiration: (!) 22   Pulse Oximetry: 99 %     Physical Exam   Constitutional: She is oriented to person, place, and time.   HENT:   Head: Normocephalic and atraumatic.   Eyes: Conjunctivae are normal.   Neck: Neck supple. No JVD present.   Cardiovascular: Normal rate.    Pulmonary/Chest: She has no wheezes. She has no rales.   Abdominal: Soft. Bowel sounds are normal. She exhibits no distension. There is no tenderness.   Musculoskeletal: She exhibits no edema.   Neurological: She is alert and oriented to person, place, and time.   Skin: Skin is warm and dry.   Psychiatric: She has a normal mood and affect.   Nursing note and vitals reviewed.      Laboratory:  Recent Labs      07/03/18   1245   WBC  8.3   RBC  4.67   HEMOGLOBIN  13.6   HEMATOCRIT  39.9   MCV  85.4   MCH  29.1   MCHC  34.1   RDW  40.5   PLATELETCT  261   MPV  10.9     Recent Labs      07/03/18   1245  07/03/18   1700   SODIUM  134*  139   POTASSIUM  4.5  4.0   CHLORIDE  102  110   CO2  12*  16*   GLUCOSE  445*  135*   BUN  20  17   CREATININE  0.75  0.51   CALCIUM  9.8  8.8     Recent Labs      07/03/18   1245  07/03/18   1700   ALTSGPT  15   --    ASTSGOT  14   --    ALKPHOSPHAT  109*   --    TBILIRUBIN  0.5   --    LIPASE  186*   --    GLUCOSE  445*  135*                 Lab Results   Component Value Date    TROPONINI <0.01 07/27/2017       Urinalysis:    Lab Results   Component Value Date    SPECGRAVITY 1.037 07/03/2018    GLUCOSEUR >=1000 (A) 07/03/2018    KETONES >=160 07/03/2018     NITRITE Negative 07/03/2018    WBCURINE 20-50 (A) 04/01/2018    RBCURINE 2-5 (A) 04/01/2018    BACTERIA Moderate (A) 04/01/2018    EPITHELCELL Few 04/01/2018        Imaging:  No orders to display         Assessment/Plan:  I anticipate this patient will require at least two midnights for appropriate medical management, necessitating inpatient admission.    * Diabetic ketoacidosis (HCC)- (present on admission)   Assessment & Plan    Multiple admission for DKA recent one just five days ago, and now back again with DKA.  Starting on DKA prtocol  Admitting to ICU            Hyponatremia- (present on admission)   Assessment & Plan    Due to dehydration from nausea and vomiting from DKA.  Started on IVF   Monitor         Intractable nausea and vomiting- (present on admission)   Assessment & Plan    Probably due to DKA  Started on antiemetics, also on IVF         Gastroparesis due to DM (HCC)- (present on admission)   Assessment & Plan    Due to not controlled DM  Continue on reglan             VTE prophylaxis: heparin

## 2018-07-04 NOTE — PROGRESS NOTES
Contacted Dr. Acuña regarding the following:  Four consecutive, stable finger sticks  Anion gap- 9.0  CO2- 15    Instructed to decrease insulin to 2 Units per hour.

## 2018-07-04 NOTE — CARE PLAN
Problem: Safety  Goal: Will remain free from falls  Outcome: PROGRESSING AS EXPECTED    Intervention: Assess risk factors for falls  Risk factors assessed  Intervention: Implement fall precautions   07/04/18 0508   OTHER   Environmental Precautions Treaded Slipper Socks on Patient;Report Given to Other Health Care Providers Regarding Fall Risk;Mobility Assessed & Appropriate Sign Placed;Personal Belongings, Wastebasket, Call Bell etc. in Easy Reach;Communication Sign for Patients & Families;Transferred to Stronger Side;Bed in Low Position   Chair/Bed Strip Alarm Yes - Alarm On   Bed Alarm Yes - Alarm On         Problem: Infection  Goal: Will remain free from infection  Outcome: PROGRESSING AS EXPECTED    Intervention: Assess signs and symptoms of infection  s/s assessed  Intervention: Implement standard precautions and perform hand washing before and after patient contact  Hand washing performed   Intervention: Give CDC handouts for infection prevention (infection prevention/hand washing, disease specific, and device specific) and document in Education  Educated orally   Intervention: Assess for removal of potential routes of infection, such as IV, central line, intra-arterial or urinary catheters  Routes assessed

## 2018-07-04 NOTE — ASSESSMENT & PLAN NOTE
Likely multifactorial, 2/2 DKA and gastroparesis from DM. Still with nausea but no vomiting.  - antiemetics and IVF  - advancing diet as tolerated  - ?Dr. Knapp for possible J tube placement

## 2018-07-04 NOTE — PROGRESS NOTES
Patient's finger stick blood glucose- 96   was informed and ordered the insulin rate change to 1 unit/hour.

## 2018-07-04 NOTE — PROGRESS NOTES
Assumed care of patient and brought to [facility] from ED, No acute distress noted. Patient denies pain.

## 2018-07-04 NOTE — PROGRESS NOTES
"[facility] Hospitalist Progress Note    Date of Service: 2018    Chief Complaint  27 y.o. female admitted 7/3/2018 with vomiting    Interval Problem Update  Ms. Sparks has a history of IDDM and recurrent admissions for DKA that presented to the ER with vomiting. She had recently been admitted from  through  with DKA and intractable vomiting. In the ER, she was found to have a glucose of 445 and a bicarb of 12 thus has been admitted to the ICU on an insulin drip and IV fluids.   Her RN notes that she has been vomiting \"green\" vomit. She has been tachycardic. Her mother is at bedside and we discussed the possibility of a J tube and a port at some point.  Consultants/Specialty      Disposition  ICU        Review of Systems   Constitutional: Negative for chills and fever.   Cardiovascular: Negative for chest pain.   Gastrointestinal: Positive for abdominal pain and vomiting.   Psychiatric/Behavioral: The patient is nervous/anxious.    All other systems reviewed and are negative.     Physical Exam  Laboratory/Imaging   Hemodynamics  Temp (24hrs), Av.8 °C (98.3 °F), Min:36.7 °C (98 °F), Max:37 °C (98.6 °F)   Temperature: 37 °C (98.6 °F)  Pulse  Av.8  Min: 1  Max: 120 Heart Rate (Monitored): (!) 116  Blood Pressure: 102/75, NIBP: 150/94      Respiratory      Respiration: 15, Pulse Oximetry: 99 %, O2 Daily Delivery Respiratory : Room Air with O2 Available             Fluids    Intake/Output Summary (Last 24 hours) at 18 0719  Last data filed at 18 0600   Gross per 24 hour   Intake             5590 ml   Output              900 ml   Net             4690 ml       Nutrition  Orders Placed This Encounter   Procedures   • Diet NPO     Standing Status:   Standing     Number of Occurrences:   1     Order Specific Question:   Restrict to:     Answer:   Sips with Medications [3]     Physical Exam   Constitutional: She is oriented to person, place, and time. She appears well-nourished. No distress. "   Neck: Neck supple.   Cardiovascular:   No murmur heard.  Sinus tachycardia   Pulmonary/Chest: Effort normal. No respiratory distress. She has no wheezes.   Abdominal: Soft. She exhibits no distension. There is no tenderness.   Musculoskeletal: She exhibits no edema or tenderness.   Neurological: She is alert and oriented to person, place, and time.   Skin: She is not diaphoretic. There is pallor.   Psychiatric: She has a normal mood and affect. Her behavior is normal.   Nursing note and vitals reviewed.      Recent Labs      07/03/18   1245   WBC  8.3   RBC  4.67   HEMOGLOBIN  13.6   HEMATOCRIT  39.9   MCV  85.4   MCH  29.1   MCHC  34.1   RDW  40.5   PLATELETCT  261   MPV  10.9     Recent Labs      07/03/18   1700  07/03/18   2208  07/04/18   0200   SODIUM  139  135  135   POTASSIUM  4.0  5.3  4.9   CHLORIDE  110  108  113*   CO2  16*  11*  9*   GLUCOSE  135*  384*  305*   BUN  17  14  10   CREATININE  0.51  0.48*  0.49*   CALCIUM  8.8  7.9*  7.7*                      Assessment/Plan     * Diabetic ketoacidosis (HCC)- (present on admission)   Assessment & Plan    Recurrent DKA  Admitted to the ICU for an IV insulin drip and IV fluids  Follow fingerstick glucose q1 hour and BMP q4 hours with potassium, phosphorus, and magnesium replacement.             Intractable nausea and vomiting- (present on admission)   Assessment & Plan    Probably due to DKA  Started on antiemetics, also on IVF   She may be a candidate for a J tube.        Gastroparesis due to DM (HCC)- (present on admission)   Assessment & Plan    She has [facility].  Gastric emptying study 2014 was negative.        Esophagitis- (present on admission)   Assessment & Plan    Noted on EGD in June        Type 1 diabetes mellitus (HCC)- (present on admission)   Assessment & Plan    Difficult control.   She is on Basaglar 33 units BID        Pyloric stenosis- (present on admission)   Assessment & Plan    s/p dilation by EGD 6/9/18           Quality-Core Measures   Reviewed items::  Labs reviewed and Medications reviewed  DVT prophylaxis pharmacological::  Enoxaparin (Lovenox)      Ms. Sparks is critically ill and 35 minutes of critical care time were spent today in the direct management of DKA requiring an IV insulin drip and management of IV fluids and electrolytes in the ICU. See orders.

## 2018-07-05 LAB
GLUCOSE BLD-MCNC: 105 MG/DL (ref 65–99)
GLUCOSE BLD-MCNC: 106 MG/DL (ref 65–99)
GLUCOSE BLD-MCNC: 113 MG/DL (ref 65–99)
GLUCOSE BLD-MCNC: 116 MG/DL (ref 65–99)
GLUCOSE BLD-MCNC: 129 MG/DL (ref 65–99)
GLUCOSE BLD-MCNC: 144 MG/DL (ref 65–99)
GLUCOSE BLD-MCNC: 148 MG/DL (ref 65–99)
GLUCOSE BLD-MCNC: 185 MG/DL (ref 65–99)
GLUCOSE BLD-MCNC: 189 MG/DL (ref 65–99)
GLUCOSE BLD-MCNC: 98 MG/DL (ref 65–99)

## 2018-07-05 PROCEDURE — 82962 GLUCOSE BLOOD TEST: CPT

## 2018-07-05 PROCEDURE — 700102 HCHG RX REV CODE 250 W/ 637 OVERRIDE(OP): Performed by: HOSPITALIST

## 2018-07-05 PROCEDURE — A9270 NON-COVERED ITEM OR SERVICE: HCPCS | Performed by: INTERNAL MEDICINE

## 2018-07-05 PROCEDURE — 700102 HCHG RX REV CODE 250 W/ 637 OVERRIDE(OP): Performed by: INTERNAL MEDICINE

## 2018-07-05 PROCEDURE — 700101 HCHG RX REV CODE 250: Performed by: HOSPITALIST

## 2018-07-05 PROCEDURE — 770006 HCHG ROOM/CARE - MED/SURG/GYN SEMI*

## 2018-07-05 PROCEDURE — 99232 SBSQ HOSP IP/OBS MODERATE 35: CPT | Performed by: HOSPITALIST

## 2018-07-05 PROCEDURE — A9270 NON-COVERED ITEM OR SERVICE: HCPCS | Performed by: HOSPITALIST

## 2018-07-05 PROCEDURE — 700111 HCHG RX REV CODE 636 W/ 250 OVERRIDE (IP): Performed by: INTERNAL MEDICINE

## 2018-07-05 PROCEDURE — 700111 HCHG RX REV CODE 636 W/ 250 OVERRIDE (IP): Performed by: HOSPITALIST

## 2018-07-05 RX ADMIN — METOCLOPRAMIDE HYDROCHLORIDE 10 MG: 5 TABLET ORAL at 05:40

## 2018-07-05 RX ADMIN — OMEPRAZOLE 20 MG: 20 CAPSULE, DELAYED RELEASE ORAL at 08:35

## 2018-07-05 RX ADMIN — GABAPENTIN 100 MG: 100 CAPSULE ORAL at 08:35

## 2018-07-05 RX ADMIN — Medication 325 MG: at 08:35

## 2018-07-05 RX ADMIN — INSULIN GLARGINE 20 UNITS: 100 INJECTION, SOLUTION SUBCUTANEOUS at 23:37

## 2018-07-05 RX ADMIN — GABAPENTIN 100 MG: 100 CAPSULE ORAL at 19:26

## 2018-07-05 RX ADMIN — ACETAMINOPHEN 650 MG: 325 TABLET, FILM COATED ORAL at 19:25

## 2018-07-05 RX ADMIN — ACETAMINOPHEN 650 MG: 325 TABLET, FILM COATED ORAL at 08:38

## 2018-07-05 RX ADMIN — METOCLOPRAMIDE HYDROCHLORIDE 10 MG: 5 TABLET ORAL at 12:03

## 2018-07-05 RX ADMIN — POTASSIUM CHLORIDE AND SODIUM CHLORIDE: 900; 150 INJECTION, SOLUTION INTRAVENOUS at 12:43

## 2018-07-05 RX ADMIN — SUCRALFATE 1 G: 1 SUSPENSION ORAL at 05:39

## 2018-07-05 RX ADMIN — SUCRALFATE 1 G: 1 SUSPENSION ORAL at 17:23

## 2018-07-05 RX ADMIN — PROCHLORPERAZINE EDISYLATE 10 MG: 5 INJECTION INTRAMUSCULAR; INTRAVENOUS at 08:35

## 2018-07-05 RX ADMIN — SUCRALFATE 1 G: 1 SUSPENSION ORAL at 23:42

## 2018-07-05 RX ADMIN — OLANZAPINE 5 MG: 5 TABLET, FILM COATED ORAL at 19:25

## 2018-07-05 RX ADMIN — ENOXAPARIN SODIUM 40 MG: 100 INJECTION SUBCUTANEOUS at 08:35

## 2018-07-05 RX ADMIN — SUCRALFATE 1 G: 1 SUSPENSION ORAL at 12:03

## 2018-07-05 RX ADMIN — INSULIN HUMAN 2 UNITS: 100 INJECTION, SOLUTION PARENTERAL at 12:07

## 2018-07-05 RX ADMIN — INSULIN HUMAN 2 UNITS: 100 INJECTION, SOLUTION PARENTERAL at 05:44

## 2018-07-05 RX ADMIN — POTASSIUM CHLORIDE AND SODIUM CHLORIDE: 900; 150 INJECTION, SOLUTION INTRAVENOUS at 03:33

## 2018-07-05 RX ADMIN — METOCLOPRAMIDE HYDROCHLORIDE 10 MG: 5 TABLET ORAL at 17:23

## 2018-07-05 RX ADMIN — INSULIN GLARGINE 20 UNITS: 100 INJECTION, SOLUTION SUBCUTANEOUS at 08:35

## 2018-07-05 ASSESSMENT — COGNITIVE AND FUNCTIONAL STATUS - GENERAL
MOBILITY SCORE: 24
DAILY ACTIVITIY SCORE: 24
SUGGESTED CMS G CODE MODIFIER MOBILITY: CH
SUGGESTED CMS G CODE MODIFIER DAILY ACTIVITY: CH

## 2018-07-05 ASSESSMENT — ENCOUNTER SYMPTOMS
CONSTITUTIONAL NEGATIVE: 1
NERVOUS/ANXIOUS: 0
VOMITING: 0
DIARRHEA: 0
CONSTIPATION: 0
BLOOD IN STOOL: 0
VOMITING: 1
NAUSEA: 1
INSOMNIA: 0
HEARTBURN: 0
FEVER: 0
CARDIOVASCULAR NEGATIVE: 1
NEUROLOGICAL NEGATIVE: 1
RESPIRATORY NEGATIVE: 1
PSYCHIATRIC NEGATIVE: 1
SHORTNESS OF BREATH: 0
ABDOMINAL PAIN: 1
EYES NEGATIVE: 1
ROS GI COMMENTS: TOLERATING CLEARS
MUSCULOSKELETAL NEGATIVE: 1
CHILLS: 0

## 2018-07-05 ASSESSMENT — PAIN SCALES - GENERAL
PAINLEVEL_OUTOF10: 8
PAINLEVEL_OUTOF10: 8
PAINLEVEL_OUTOF10: 7
PAINLEVEL_OUTOF10: 8

## 2018-07-05 ASSESSMENT — LIFESTYLE VARIABLES: EVER_SMOKED: NEVER

## 2018-07-05 NOTE — CARE PLAN
Problem: Safety  Goal: Will remain free from falls  Outcome: PROGRESSING AS EXPECTED    Intervention: Assess risk factors for falls  Risk factors assessed  Intervention: Implement fall precautions   07/05/18 0314   OTHER   Environmental Precautions Treaded Slipper Socks on Patient;Mobility Assessed & Appropriate Sign Placed;Personal Belongings, Wastebasket, Call Bell etc. in Easy Reach;Report Given to Other Health Care Providers Regarding Fall Risk;Bed in Low Position;Transferred to Stronger Side;Communication Sign for Patients & Families   Chair/Bed Strip Alarm Yes - Alarm On   Bed Alarm Yes - Alarm On         Problem: Infection  Goal: Will remain free from infection  Outcome: PROGRESSING AS EXPECTED    Intervention: Assess signs and symptoms of infection  s/s assessed  Intervention: Implement standard precautions and perform hand washing before and after patient contact  Hand washing performed   Intervention: Give CDC handouts for infection prevention (infection prevention/hand washing, disease specific, and device specific) and document in Education  Educated orally   Intervention: Assess for removal of potential routes of infection, such as IV, central line, intra-arterial or urinary catheters  Routes assessed

## 2018-07-05 NOTE — PROGRESS NOTES
Patient arrived to unit and oriented to unit routine. Communication folder provided and explained.     Patient resting comfortably in bed, no s/s of distress at this time. Patient able to make needs known and denies any at this time. Bed alarm not indicated, fall precautions implemented. Hourly rounding in place.

## 2018-07-05 NOTE — PROGRESS NOTES
[facility] Hospitalist Progress Note    Date of Service: 2018    Chief Complaint  27 y.o. female admitted 7/3/2018 with vomiting    Interval Problem Update  Ms. Sparks has a history of IDDM and recurrent admissions for DKA that presented to the ER with vomiting. She had recently been admitted from  through  with DKA and intractable vomiting. In the ER, she was found to have a glucose of 445 and a bicarb of 12 thus has been admitted to the ICU on an insulin drip and IV fluids.   I discussed her condition at length with Dr. Child. He notes that G-J tubes are not placed at [facility]. Ms. Sparks remains frustrated about her condition. She has been transitioned to SQ insulin and advanced to a clear liquid diet.   Consultants/Specialty  GI  I discussed with Dr. Knapp, surgery.  Disposition  medical        Review of Systems   Constitutional: Negative for chills and fever.   Respiratory: Negative for shortness of breath.    Cardiovascular: Negative for chest pain.   Gastrointestinal: Positive for abdominal pain and nausea. Negative for vomiting.        Tolerating clears   Psychiatric/Behavioral: The patient is not nervous/anxious and does not have insomnia.    All other systems reviewed and are negative.     Physical Exam  Laboratory/Imaging   Hemodynamics  Temp (24hrs), Av.8 °C (98.2 °F), Min:36.6 °C (97.8 °F), Max:37 °C (98.6 °F)   Temperature: 37 °C (98.6 °F)  Pulse  Av.9  Min: 1  Max: 120 Heart Rate (Monitored): 95  NIBP: 128/88      Respiratory      Respiration: (!) 1, Pulse Oximetry: 97 %             Fluids    Intake/Output Summary (Last 24 hours) at 18 0750  Last data filed at 18 0600   Gross per 24 hour   Intake          2993.14 ml   Output                0 ml   Net          2993.14 ml       Nutrition  Orders Placed This Encounter   Procedures   • Diet Order Clear Liquid, Diabetic     Standing Status:   Standing     Number of Occurrences:   1     Order Specific Question:   Diet:      Answer:   Clear Liquid [10]     Order Specific Question:   Diet:     Answer:   Diabetic [3]     Physical Exam   Constitutional: She is oriented to person, place, and time. She appears well-nourished. No distress.   HENT:   Head: Atraumatic.   Neck: Neck supple.   Cardiovascular:   No murmur heard.  Sinus rhythm   Pulmonary/Chest: Effort normal. No respiratory distress. She has no wheezes.   Abdominal: Soft. She exhibits no distension. There is no tenderness.   Mild, diffuse abdominal pain   Musculoskeletal: She exhibits no edema.   Neurological: She is alert and oriented to person, place, and time.   Skin: She is not diaphoretic. There is pallor.   Psychiatric: She has a normal mood and affect. Her behavior is normal.   Nursing note and vitals reviewed.      Recent Labs      07/03/18   1245   WBC  8.3   RBC  4.67   HEMOGLOBIN  13.6   HEMATOCRIT  39.9   MCV  85.4   MCH  29.1   MCHC  34.1   RDW  40.5   PLATELETCT  261   MPV  10.9     Recent Labs      07/04/18   0600  07/04/18   0900  07/04/18   1330   SODIUM  138  135  133*   POTASSIUM  4.5  4.0  3.8   CHLORIDE  113*  111  108   CO2  11*  15*  18*   GLUCOSE  146*  121*  103*   BUN  8  8  6*   CREATININE  0.44*  0.36*  0.43*   CALCIUM  8.5  8.4*  8.5                      Assessment/Plan     * Diabetic ketoacidosis (HCC)- (present on admission)   Assessment & Plan    Recurrent DKA  Admitted to the ICU for an IV insulin drip and IV fluids and potassium, phosphorus, and magnesium replacement.   Acidosis resolved thus advance to diabetic diet and SQ insulin  Her baseline is 33  Units of Glargine BID            Intractable nausea and vomiting- (present on admission)   Assessment & Plan    Probably due to DKA  Started on antiemetics, also on IVF   She may be a candidate for a J tube for nutrition and a G tube to suction for vomiting.  [facility] called for J-G tube and, if not able, then possible surgery for J tube and also evaluate for a port.        Gastroparesis due  to DM (HCC)- (present on admission)   Assessment & Plan    She has been followed by [facility].  Gastric emptying study 2014 was negative.        Esophagitis- (present on admission)   Assessment & Plan    Noted on EGD in June        Type 1 diabetes mellitus (HCC)- (present on admission)   Assessment & Plan    Difficult control.   She is on Basaglar 33 units BID  HbA1c 8.8        Pyloric stenosis- (present on admission)   Assessment & Plan    s/p dilation by EGD 6/9/18          Quality-Core Measures   Reviewed items::  Labs reviewed and Medications reviewed  DVT prophylaxis pharmacological::  Enoxaparin (Lovenox)

## 2018-07-05 NOTE — CARE PLAN
Problem: Safety  Goal: Will remain free from falls  Outcome: PROGRESSING AS EXPECTED      Problem: Pain Management  Goal: Pain level will decrease to patient's comfort goal  Outcome: PROGRESSING SLOWER THAN EXPECTED

## 2018-07-05 NOTE — DISCHARGE PLANNING
Medical SW    Sw attended AM IDT Rounds.    RN reports, pt has medical transfer orders and no new updates    Plan: Sw to assist w/ d/c planning as needed.

## 2018-07-05 NOTE — PROGRESS NOTES
Patient complained of sharp pain the abdomen. Dr. Acuña was informed. After discussion, Dr. Acuña to ordered tylenol.

## 2018-07-05 NOTE — PROGRESS NOTES
Monitor Summary:  Rhythm-Sinus rhythm- sinus tachycardia  HR-90s-120s  Ectopy-None  Measurements-.14/.08/.34       12-Hour chart check

## 2018-07-05 NOTE — CONSULTS
Date of Consultation:  7/5/2018    Patient: : Alis Sparks  MRN: [id number]    Referring Physician: Dr. Acuña     GI:Lior Child M.D.     Reason for Consultation:Nausea/Vomiting, J tube placement      History of Present Illness:   Thank you for allowing us to consult on this patient. This is a truly delightful 27-year-old female with past oral history of type I diabetes for 11 years in duration, insulin-dependent, , recurrent diabetic ketoacidosis requiring admission, history of questionable gastroparesis who was admitted again for epigastric discomfort nausea vomiting and recurrent DKA.  was recently admitted from June 13, 2018 through June 29, 2018 with similar complaints of epigastric discomfort DKA intractable vomiting. Ultimately symptoms improve and patient was discharged however was only home for 5 days before returning with similar symptoms. Patient reports symptoms of able to tolerate some food for the 1st 4 days however had developed epigastric discomfort son onset that woke her up from bed and then subsequently nauseous vomiting without improvement and thus reports emergency room. Evaluation demonstrated patient had recurrence of diabetic ketoacidosis. Due to persistent recurrence GI consult at for discussion about J-tube placement for nutritional support. Patient denies any NSAIDs no hematemesis. Emesis green. However since admission able to tolerate some diet today without vomiting. Reports bowel movement every other day. No greasy appearing stool. Reports subjective weight loss 15 pounds over last 1 month    Previous records reviewed.    Most recently she was consulted on 6/6/2018 by Dr. Paredes. Note reviewed and extrapolated as follow: history of multiple admissions for recurrent nausea and vomiting and DKA dating back to 2014.  She was evaluated in 2014 with EGD and 90 minute gastric emptying study.  EGD was unremarkable except nonerosive gastritis and single duodenal diverticulum.   Gastric biopsies negative for H pylori.  She also had 90 minute gastric emptying study that was normal.  She was diagnosed with suspected gastroparesis and started on Reglan. Recent CT ab/pelvis showing some thickening of distal esophagus and abdominal US which shows small GB sludge only. Previously she has been on IV Reglan, Zofran, Compazine and Benadryl without much improvement.  Patient recently had an EGD June 9, 2018 showing erosive esophagitis and gastritis with possible pyloric stenosis status post dilation by Dr. Black to 20mm.      She was admitted in June 13 in June 29 with similar symptoms with supportive care utilizing cyclic vomiting syndrome protocol.        Past Medical History:   Diagnosis Date   • Backpain    • Bronchitis    • Diabetes type 1, controlled ([facility])     tests 4-5 times daily   • DKA (diabetic ketoacidoses) ([facility])    • Fall    • Gastroparesis    • Kidney infection    • Pneumonia    • UTI (urinary tract infection)          Past Surgical History:   Procedure Laterality Date   • GASTROSCOPY-ENDO N/A 6/9/2018    Procedure: GASTROSCOPY-ENDO WITH BIOPSIES AND DIALATION;  Surgeon: Marino Black M.D.;  Location: SURGERY [facility];  Service: Gastroenterology   • SUBMANDIBLE ABSCESS INCISION AND DRAINAGE Left 11/8/2017    Procedure: SUBMANDIBLE ABSCESS INCISION AND DRAINAGE;  Surgeon: Osman Young M.D.;  Location: SURGERY [facility];  Service: Dental   • DENTAL EXTRACTION(S)  11/8/2017    Procedure: DENTAL EXTRACTION(S);  Surgeon: Osman Young M.D.;  Location: SURGERY [facility];  Service: Dental   • GASTROSCOPY-ENDO  9/18/2014    Performed by Sylvain PERRY M.D. at ENDOSCOPY [facility]   • GASTROSCOPY WITH BIOPSY  9/18/2014    Performed by Sylvain PERRY M.D. at ENDOSCOPY [facility]   • OTHER      surgery to patch lung after pneumor from central line placement       Family History   Problem Relation Age of Onset   • Cancer       breasts, multiple family members    • Hypertension Maternal Grandfather        Social History     Social History   • Marital status: Single     Spouse name: N/A   • Number of children: N/A   • Years of education: N/A     Social History Main Topics   • Smoking status: Never Smoker   • Smokeless tobacco: Never Used   • Alcohol use No   • Drug use: No   • Sexual activity: No     Other Topics Concern   • Not on file     Social History Narrative   • No narrative on file       Review of Systems   Constitutional: Negative.    HENT: Negative.    Eyes: Negative.    Respiratory: Negative.    Cardiovascular: Negative.    Gastrointestinal: Positive for abdominal pain, nausea and vomiting. Negative for blood in stool, constipation, diarrhea, heartburn and melena.   Musculoskeletal: Negative.    Skin: Negative.    Neurological: Negative.    Endo/Heme/Allergies: Negative.    Psychiatric/Behavioral: Negative.          Physical Exam:  Vitals:    07/05/18 1100 07/05/18 1200 07/05/18 1300 07/05/18 1400   BP:       Pulse: (!) 109 98 (!) 101 98   Resp: 13 (!) 25 13 17   Temp:  36.9 °C (98.4 °F)     SpO2: 98% 95% 97% 97%   Weight:       Height:           Physical Exam   Constitutional: She is oriented to person, place, and time and well-developed, well-nourished, and in no distress. No distress.   HENT:   Head: Normocephalic and atraumatic.   Mouth/Throat: No oropharyngeal exudate.   Eyes: Conjunctivae and EOM are normal. Pupils are equal, round, and reactive to light. Right eye exhibits no discharge. Left eye exhibits no discharge. No scleral icterus.   Neck: Normal range of motion. Neck supple. No JVD present. No tracheal deviation present. No thyromegaly present.   Cardiovascular: Normal rate and regular rhythm.    Pulmonary/Chest: Effort normal and breath sounds normal. No stridor. No respiratory distress. She has no wheezes. She has no rales. She exhibits no tenderness.   Abdominal: Soft. Bowel sounds are normal. She exhibits no distension and no mass. There is  tenderness. There is no rebound and no guarding.   Epigastric tenderness   Musculoskeletal: Normal range of motion. She exhibits no edema.   Lymphadenopathy:     She has no cervical adenopathy.   Neurological: She is alert and oriented to person, place, and time.   Skin: Skin is warm and dry. No rash noted. She is not diaphoretic. No erythema. No pallor.         Labs:  Recent Labs      07/03/18   1245   WBC  8.3   RBC  4.67   HEMOGLOBIN  13.6   HEMATOCRIT  39.9   MCV  85.4   MCH  29.1   MCHC  34.1   RDW  40.5   PLATELETCT  261   MPV  10.9     Recent Labs      07/04/18   0600  07/04/18   0900  07/04/18   1330   SODIUM  138  135  133*   POTASSIUM  4.5  4.0  3.8   CHLORIDE  113*  111  108   CO2  11*  15*  18*   GLUCOSE  146*  121*  103*   BUN  8  8  6*             Imaging:  Upper GI 6/19/2018  Examination is significantly limited as the patient vomited the majority of ingested contrast and refused to take more contrast by mouth.  No evidence of gastric outlet obstruction.  There may be mild gastric fold thickening which can be seen in the setting of gastritis.  No evidence of malrotation.      KUB 6/15/2018  No evidence for bowel obstruction.    HIDA 6/10/2018  1. Normal hepatobiliary scan. No evidence of acute cholecystitis.  2. Normal gallbladder ejection fraction.    US Complete 6/9/2018  Heterogeneously echogenic lesion within the right lobe of the liver measuring 3 x 2.8 x 2.2 cm may represent a hemangioma and was seen on prior ultrasound. Confirmation can be obtained with hepatic protocol MRI.  Hepatomegaly.  No evidence of gallstones or biliary ductal dilatation.    CT scan 6/3/2018  1.  Thickening of the distal esophageal wall, new since prior study, consider component of esophagitis. Could be followed up with endoscopy for further evaluation as clinically appropriate.  2.  Hepatomegaly      Impressions:  1. Recurrent epigastric pain  2. Nausea/vomiting  3. Diabetic ketoacidosis  4. Type 1  diabetes    19-year-old female with type I diabetes insulin-dependent for 11 year who comes in for recurrent diabetic ketoacidosis with nausea vomiting with epigastric discomfort. Evaluation this year the last few months including CT scan ultrasound HIDA scan without gross abnormalities. Attempt for upper GI limited by patient's refusal to ingest more contrast however no gross abnormalities. Patient also have EGD done as well with pyloric dilation without significant improvement. The exact etiology of gastric discomfort is unclear. Unclear also why recurrent nausea vomiting; hospitalist team requests discussion about J-tube placement for nutrition. Discuss with patient that it may be helpful to support her nutrition would J-tube when she have these frequent episodes however may not answer why she is having this recurrent discomfort and pain. Patient has not established with any outpatient GI physician and have not been seen at [facility] for evaluation.      Recommendations:  1. Agree with consideration for surgical J-tube placement for nutritional support which will be helpful during exacerbation if patient interested in proceeding.   2. Strong consideration for referral to [facility] for further evaluation of recurrent exacerbation of pain.  3. Continue supportive care by primary team for DKA  4. Zofran and Reglan ( limited to use only) while during acute exacerbation due to potential for tardive dyskinesia.  5. Recommended primary team to check fecal elastase fecal fat (lipase mildly elevated on admission) as well as porphyria screen with urine while still in the hospital.    GI TO SIGN OFF / STAND BY - PLEASE CALL IF ANY CONCERNS OR QUESTIONS      This note was generated using voice recognition software which has a small chance of producing errors of grammar and possibly content. I have made every reasonable attempt to find and correct any obvious errors, but expect that some may not be found  prior to finalization of this note.

## 2018-07-06 LAB
GLUCOSE BLD-MCNC: 165 MG/DL (ref 65–99)
GLUCOSE BLD-MCNC: 218 MG/DL (ref 65–99)
GLUCOSE BLD-MCNC: 219 MG/DL (ref 65–99)
GLUCOSE BLD-MCNC: 300 MG/DL (ref 65–99)

## 2018-07-06 PROCEDURE — 82962 GLUCOSE BLOOD TEST: CPT

## 2018-07-06 PROCEDURE — 700101 HCHG RX REV CODE 250: Performed by: HOSPITALIST

## 2018-07-06 PROCEDURE — 84110 ASSAY OF PORPHOBILINOGEN: CPT

## 2018-07-06 PROCEDURE — 82570 ASSAY OF URINE CREATININE: CPT

## 2018-07-06 PROCEDURE — 84120 ASSAY OF URINE PORPHYRINS: CPT

## 2018-07-06 PROCEDURE — 99232 SBSQ HOSP IP/OBS MODERATE 35: CPT | Performed by: HOSPITALIST

## 2018-07-06 PROCEDURE — A9270 NON-COVERED ITEM OR SERVICE: HCPCS | Performed by: HOSPITALIST

## 2018-07-06 PROCEDURE — 770006 HCHG ROOM/CARE - MED/SURG/GYN SEMI*

## 2018-07-06 PROCEDURE — 700111 HCHG RX REV CODE 636 W/ 250 OVERRIDE (IP): Performed by: HOSPITALIST

## 2018-07-06 PROCEDURE — 700102 HCHG RX REV CODE 250 W/ 637 OVERRIDE(OP): Performed by: HOSPITALIST

## 2018-07-06 PROCEDURE — A9270 NON-COVERED ITEM OR SERVICE: HCPCS | Performed by: INTERNAL MEDICINE

## 2018-07-06 PROCEDURE — 700102 HCHG RX REV CODE 250 W/ 637 OVERRIDE(OP): Performed by: INTERNAL MEDICINE

## 2018-07-06 RX ORDER — INSULIN GLARGINE 100 [IU]/ML
25 INJECTION, SOLUTION SUBCUTANEOUS 2 TIMES DAILY
Status: DISCONTINUED | OUTPATIENT
Start: 2018-07-06 | End: 2018-07-07 | Stop reason: HOSPADM

## 2018-07-06 RX ADMIN — OMEPRAZOLE 20 MG: 20 CAPSULE, DELAYED RELEASE ORAL at 08:47

## 2018-07-06 RX ADMIN — POTASSIUM CHLORIDE AND SODIUM CHLORIDE: 900; 150 INJECTION, SOLUTION INTRAVENOUS at 06:12

## 2018-07-06 RX ADMIN — SUCRALFATE 1 G: 1 SUSPENSION ORAL at 17:02

## 2018-07-06 RX ADMIN — SUCRALFATE 1 G: 1 SUSPENSION ORAL at 11:37

## 2018-07-06 RX ADMIN — INSULIN GLARGINE 20 UNITS: 100 INJECTION, SOLUTION SUBCUTANEOUS at 08:51

## 2018-07-06 RX ADMIN — SUCRALFATE 1 G: 1 SUSPENSION ORAL at 06:17

## 2018-07-06 RX ADMIN — ACETAMINOPHEN 650 MG: 325 TABLET, FILM COATED ORAL at 19:17

## 2018-07-06 RX ADMIN — POTASSIUM CHLORIDE AND SODIUM CHLORIDE: 900; 150 INJECTION, SOLUTION INTRAVENOUS at 17:05

## 2018-07-06 RX ADMIN — METOCLOPRAMIDE HYDROCHLORIDE 10 MG: 5 TABLET ORAL at 11:36

## 2018-07-06 RX ADMIN — STANDARDIZED SENNA CONCENTRATE AND DOCUSATE SODIUM 2 TABLET: 8.6; 5 TABLET, FILM COATED ORAL at 20:51

## 2018-07-06 RX ADMIN — OLANZAPINE 5 MG: 5 TABLET, FILM COATED ORAL at 20:51

## 2018-07-06 RX ADMIN — INSULIN HUMAN 3 UNITS: 100 INJECTION, SOLUTION PARENTERAL at 20:51

## 2018-07-06 RX ADMIN — METOCLOPRAMIDE HYDROCHLORIDE 10 MG: 5 TABLET ORAL at 08:48

## 2018-07-06 RX ADMIN — Medication 325 MG: at 08:47

## 2018-07-06 RX ADMIN — INSULIN HUMAN 5 UNITS: 100 INJECTION, SOLUTION PARENTERAL at 17:03

## 2018-07-06 RX ADMIN — INSULIN HUMAN 3 UNITS: 100 INJECTION, SOLUTION PARENTERAL at 11:30

## 2018-07-06 RX ADMIN — ACETAMINOPHEN 650 MG: 325 TABLET, FILM COATED ORAL at 09:08

## 2018-07-06 RX ADMIN — METOCLOPRAMIDE HYDROCHLORIDE 10 MG: 5 TABLET ORAL at 17:02

## 2018-07-06 RX ADMIN — INSULIN HUMAN 2 UNITS: 100 INJECTION, SOLUTION PARENTERAL at 08:48

## 2018-07-06 RX ADMIN — ENOXAPARIN SODIUM 40 MG: 100 INJECTION SUBCUTANEOUS at 09:10

## 2018-07-06 RX ADMIN — INSULIN GLARGINE 25 UNITS: 100 INJECTION, SOLUTION SUBCUTANEOUS at 20:59

## 2018-07-06 RX ADMIN — GABAPENTIN 100 MG: 100 CAPSULE ORAL at 08:47

## 2018-07-06 RX ADMIN — GABAPENTIN 100 MG: 100 CAPSULE ORAL at 20:51

## 2018-07-06 ASSESSMENT — ENCOUNTER SYMPTOMS
CHILLS: 0
SEIZURES: 0
FALLS: 0
VOMITING: 0
HEADACHES: 0
BLOOD IN STOOL: 0
NERVOUS/ANXIOUS: 0
FOCAL WEAKNESS: 0
FEVER: 0
ABDOMINAL PAIN: 1
INSOMNIA: 0
SHORTNESS OF BREATH: 0
NAUSEA: 1

## 2018-07-06 ASSESSMENT — PAIN SCALES - GENERAL
PAINLEVEL_OUTOF10: 9
PAINLEVEL_OUTOF10: 6

## 2018-07-06 NOTE — CARE PLAN
Problem: Safety  Goal: Will remain free from injury    Intervention: Provide assistance with mobility  Educated to call for assistance if needed, if having any lightheadedness and dizziness, reported no problems and stated understanding.       Problem: Pain Management  Goal: Pain level will decrease to patient's comfort goal    Intervention: Follow pain managment plan developed in collaboration with patient and Interdisciplinary Team  Pain will be managed and well controlled. Administer pain medications as ordered, requested and when indicated. Assess routinely and as needed for pain, educated on other intervention techniques, stated understanding.

## 2018-07-06 NOTE — PROGRESS NOTES
2RN skin assessment completed. Pt has not open sores or wounds. No pressure ulcers noted. Right hip healing bruise noted.

## 2018-07-06 NOTE — CARE PLAN
Problem: Communication  Goal: The ability to communicate needs accurately and effectively will improve    Intervention: [place] patient and significant other/support system to call light to alert staff of needs  POC discussed. Questions answered at bedside.       Problem: Fluid Volume:  Goal: Will maintain balanced intake and output    Intervention: Monitor, educate, and encourage compliance with therapeutic intake of liquids  Pt not c/o any n/v. IVF infusing. Encouraging PO fluid intake. Will continue to monitor.

## 2018-07-06 NOTE — PROGRESS NOTES
[facility] Hospitalist Progress Note    Date of Service: 2018    Chief Complaint  27 y.o. female admitted 7/3/2018 with vomiting. Has had recurrent admissions for DKA, most recently from  - . In the ER, she was found to have a glucose of 445 and a bicarb of 12 thus has been admitted to the ICU on an insulin drip and IV fluids.     Interval Problem Update  Doing well this AM, no N/V but still with an epigastric, sharp pain. Not associated with food intake. Was transferred from ICU.    Consultants/Specialty  GI    Disposition  Pending medical clearance.        Review of Systems   Constitutional: Negative for chills, fever and malaise/fatigue.   Respiratory: Negative for shortness of breath.    Cardiovascular: Negative for chest pain and leg swelling.   Gastrointestinal: Positive for abdominal pain and nausea. Negative for blood in stool and vomiting.   Musculoskeletal: Negative for falls.   Neurological: Negative for focal weakness, seizures and headaches.   Psychiatric/Behavioral: The patient is not nervous/anxious and does not have insomnia.    All other systems reviewed and are negative.     Physical Exam  Laboratory/Imaging   Hemodynamics  Temp (24hrs), Av.7 °C (98 °F), Min:36 °C (96.8 °F), Max:37 °C (98.6 °F)   Temperature: 36.4 °C (97.6 °F)  Pulse  Av  Min: 1  Max: 120 Heart Rate (Monitored): 87  Blood Pressure: 117/83, NIBP: 107/84      Respiratory      Respiration: 16, Pulse Oximetry: 97 %     Work Of Breathing / Effort: Mild  RUL Breath Sounds: Clear, RML Breath Sounds: Diminished, RLL Breath Sounds: Diminished, NANCY Breath Sounds: Clear, LLL Breath Sounds: Diminished    Fluids    Intake/Output Summary (Last 24 hours) at 18 0636  Last data filed at 18 1600   Gross per 24 hour   Intake             1250 ml   Output                0 ml   Net             1250 ml       Nutrition  Orders Placed This Encounter   Procedures   • Diet Order Diabetic     Standing Status:   Standing     Number  of Occurrences:   1     Order Specific Question:   Diet:     Answer:   Diabetic [3]     Physical Exam   Constitutional: She is oriented to person, place, and time. She appears well-nourished. No distress.   HENT:   Head: Normocephalic and atraumatic.   Mouth/Throat: Oropharynx is clear and moist.   Eyes: EOM are normal. No scleral icterus.   Neck: Normal range of motion. Neck supple.   Cardiovascular: Normal rate and intact distal pulses.    Pulmonary/Chest: Effort normal. No respiratory distress. She has no wheezes.   Abdominal: Soft. Bowel sounds are normal. She exhibits no distension. There is tenderness in the epigastric area.   Musculoskeletal: She exhibits no edema.   Neurological: She is alert and oriented to person, place, and time.   Skin: Skin is warm. She is not diaphoretic. There is pallor.   Psychiatric: She has a normal mood and affect. Her behavior is normal.   Vitals reviewed.      Recent Labs      07/03/18   1245   WBC  8.3   RBC  4.67   HEMOGLOBIN  13.6   HEMATOCRIT  39.9   MCV  85.4   MCH  29.1   MCHC  34.1   RDW  40.5   PLATELETCT  261   MPV  10.9     Recent Labs      07/04/18   0600  07/04/18   0900  07/04/18   1330   SODIUM  138  135  133*   POTASSIUM  4.5  4.0  3.8   CHLORIDE  113*  111  108   CO2  11*  15*  18*   GLUCOSE  146*  121*  103*   BUN  8  8  6*   CREATININE  0.44*  0.36*  0.43*   CALCIUM  8.5  8.4*  8.5                      Assessment/Plan     * Diabetic ketoacidosis (HCC)- (present on admission)   Assessment & Plan    Recurrent DKA. A1C 8.8%. Home regimen is glargine 33U BID and a sliding scale. Gap has since closed and acidosis resolved.  - s/p ICU for IV insulin drip, IV fluids, electrolyte repletion  - increase lantus to 25U BID  - sliding scale  - DM diet, advanced as tolerated clears --> regular        Intractable nausea and vomiting- (present on admission)   Assessment & Plan    Likely multifactorial, 2/2 DKA and gastroparesis from DM. Still with nausea but no vomiting.  -  antiemetics and IVF  - advancing diet as tolerated  - ?Dr. Knapp for possible J tube placement        Gastroparesis due to DM (HCC)- (present on admission)   Assessment & Plan    She has been [facility]. S/p gastric emptying study in 2014 was negative.   - GI evaluated and has signed off  - scheduled reglan  - patient considering J tube placement but anxious        Esophagitis- (present on admission)   Assessment & Plan    Noted on EGD in June  - continue with Carafate and PPI        Type 1 diabetes mellitus (HCC)- (present on admission)   Assessment & Plan    Poorly controlled, with hyperglycemia.  - management as above        Pyloric stenosis- (present on admission)   Assessment & Plan    s/p dilation by EGD 6/9/18          Quality-Core Measures   Reviewed items::  Labs reviewed and Medications reviewed  DVT prophylaxis pharmacological::  Enoxaparin (Lovenox)

## 2018-07-07 ENCOUNTER — PATIENT OUTREACH (OUTPATIENT)
Dept: HEALTH INFORMATION MANAGEMENT | Facility: OTHER | Age: 28
End: 2018-07-07

## 2018-07-07 VITALS
RESPIRATION RATE: 16 BRPM | OXYGEN SATURATION: 98 % | TEMPERATURE: 97.9 F | DIASTOLIC BLOOD PRESSURE: 93 MMHG | SYSTOLIC BLOOD PRESSURE: 128 MMHG | BODY MASS INDEX: 28.28 KG/M2 | HEART RATE: 90 BPM | WEIGHT: 169.75 LBS | HEIGHT: 65 IN

## 2018-07-07 PROBLEM — E11.10 DIABETIC KETOACIDOSIS (HCC): Status: RESOLVED | Noted: 2018-04-08 | Resolved: 2018-07-07

## 2018-07-07 LAB
ALBUMIN SERPL BCP-MCNC: 3.3 G/DL (ref 3.2–4.9)
ALBUMIN/GLOB SERPL: 1.6 G/DL
ALP SERPL-CCNC: 67 U/L (ref 30–99)
ALT SERPL-CCNC: 21 U/L (ref 2–50)
ANION GAP SERPL CALC-SCNC: 6 MMOL/L (ref 0–11.9)
AST SERPL-CCNC: 27 U/L (ref 12–45)
BILIRUB SERPL-MCNC: 0.4 MG/DL (ref 0.1–1.5)
BUN SERPL-MCNC: 5 MG/DL (ref 8–22)
CALCIUM SERPL-MCNC: 9 MG/DL (ref 8.5–10.5)
CHLORIDE SERPL-SCNC: 109 MMOL/L (ref 96–112)
CO2 SERPL-SCNC: 24 MMOL/L (ref 20–33)
CREAT SERPL-MCNC: 0.43 MG/DL (ref 0.5–1.4)
ERYTHROCYTE [DISTWIDTH] IN BLOOD BY AUTOMATED COUNT: 41.1 FL (ref 35.9–50)
GLOBULIN SER CALC-MCNC: 2.1 G/DL (ref 1.9–3.5)
GLUCOSE BLD-MCNC: 147 MG/DL (ref 65–99)
GLUCOSE BLD-MCNC: 170 MG/DL (ref 65–99)
GLUCOSE SERPL-MCNC: 93 MG/DL (ref 65–99)
HCT VFR BLD AUTO: 34 % (ref 37–47)
HGB BLD-MCNC: 11.2 G/DL (ref 12–16)
MCH RBC QN AUTO: 28.1 PG (ref 27–33)
MCHC RBC AUTO-ENTMCNC: 32.9 G/DL (ref 33.6–35)
MCV RBC AUTO: 85.4 FL (ref 81.4–97.8)
PLATELET # BLD AUTO: 204 K/UL (ref 164–446)
PMV BLD AUTO: 9.8 FL (ref 9–12.9)
POTASSIUM SERPL-SCNC: 3.3 MMOL/L (ref 3.6–5.5)
PROT SERPL-MCNC: 5.4 G/DL (ref 6–8.2)
RBC # BLD AUTO: 3.98 M/UL (ref 4.2–5.4)
SODIUM SERPL-SCNC: 139 MMOL/L (ref 135–145)
WBC # BLD AUTO: 4.7 K/UL (ref 4.8–10.8)

## 2018-07-07 PROCEDURE — 82962 GLUCOSE BLOOD TEST: CPT

## 2018-07-07 PROCEDURE — 700102 HCHG RX REV CODE 250 W/ 637 OVERRIDE(OP): Performed by: INTERNAL MEDICINE

## 2018-07-07 PROCEDURE — 85027 COMPLETE CBC AUTOMATED: CPT

## 2018-07-07 PROCEDURE — 80053 COMPREHEN METABOLIC PANEL: CPT

## 2018-07-07 PROCEDURE — 700102 HCHG RX REV CODE 250 W/ 637 OVERRIDE(OP): Performed by: HOSPITALIST

## 2018-07-07 PROCEDURE — 83520 IMMUNOASSAY QUANT NOS NONAB: CPT

## 2018-07-07 PROCEDURE — A9270 NON-COVERED ITEM OR SERVICE: HCPCS | Performed by: INTERNAL MEDICINE

## 2018-07-07 PROCEDURE — A9270 NON-COVERED ITEM OR SERVICE: HCPCS | Performed by: HOSPITALIST

## 2018-07-07 PROCEDURE — 99239 HOSP IP/OBS DSCHRG MGMT >30: CPT | Performed by: HOSPITALIST

## 2018-07-07 PROCEDURE — 36415 COLL VENOUS BLD VENIPUNCTURE: CPT

## 2018-07-07 PROCEDURE — 700111 HCHG RX REV CODE 636 W/ 250 OVERRIDE (IP): Performed by: HOSPITALIST

## 2018-07-07 RX ORDER — POTASSIUM CHLORIDE 20 MEQ/1
40 TABLET, EXTENDED RELEASE ORAL ONCE
Status: COMPLETED | OUTPATIENT
Start: 2018-07-07 | End: 2018-07-07

## 2018-07-07 RX ADMIN — SUCRALFATE 1 G: 1 SUSPENSION ORAL at 00:37

## 2018-07-07 RX ADMIN — Medication 325 MG: at 08:23

## 2018-07-07 RX ADMIN — ENOXAPARIN SODIUM 40 MG: 100 INJECTION SUBCUTANEOUS at 08:23

## 2018-07-07 RX ADMIN — METOCLOPRAMIDE HYDROCHLORIDE 10 MG: 5 TABLET ORAL at 12:12

## 2018-07-07 RX ADMIN — METOCLOPRAMIDE HYDROCHLORIDE 10 MG: 5 TABLET ORAL at 06:06

## 2018-07-07 RX ADMIN — SUCRALFATE 1 G: 1 SUSPENSION ORAL at 12:11

## 2018-07-07 RX ADMIN — INSULIN HUMAN 2 UNITS: 100 INJECTION, SOLUTION PARENTERAL at 12:13

## 2018-07-07 RX ADMIN — OMEPRAZOLE 20 MG: 20 CAPSULE, DELAYED RELEASE ORAL at 08:23

## 2018-07-07 RX ADMIN — GABAPENTIN 100 MG: 100 CAPSULE ORAL at 08:23

## 2018-07-07 RX ADMIN — INSULIN GLARGINE 25 UNITS: 100 INJECTION, SOLUTION SUBCUTANEOUS at 08:25

## 2018-07-07 RX ADMIN — POTASSIUM CHLORIDE 40 MEQ: 1500 TABLET, EXTENDED RELEASE ORAL at 08:23

## 2018-07-07 RX ADMIN — SUCRALFATE 1 G: 1 SUSPENSION ORAL at 06:06

## 2018-07-07 ASSESSMENT — PAIN SCALES - GENERAL: PAINLEVEL_OUTOF10: 0

## 2018-07-07 NOTE — CARE PLAN
Problem: Safety  Goal: Will remain free from falls  Outcome: PROGRESSING AS EXPECTED  Pt educated on fall precautions, indicated understanding. Bed locked, lowest position, side rails 2/4 up, non skid footwear on, and call light in reach. Hourly rounding performed, will continue to monitor and reassess.       Problem: Pain Management  Goal: Pain level will decrease to patient's comfort goal  Pt c/o abdominal pain, administered prn tylenol. Resting comfortably in bed, watching tv. Will continue to monitor and reassess.

## 2018-07-07 NOTE — DISCHARGE INSTRUCTIONS
Diabetic Ketoacidosis  Diabetic ketoacidosis is a life-threatening complication of diabetes. If it is not treated, it can cause severe dehydration and organ damage and can lead to a coma or death.  What are the causes?  This condition develops when there is not enough of the hormone insulin in the body. Insulin helps the body to break down sugar for energy. Without insulin, the body cannot break down sugar, so it breaks down fats instead. This leads to the production of acids that are called ketones. Ketones are poisonous at high levels.  This condition can be triggered by:  · Stress on the body that is brought on by an illness.  · Medicines that raise blood glucose levels.  · Not taking diabetes medicine.  What are the signs or symptoms?  Symptoms of this condition include:  · Fatigue.  · Weight loss.  · Excessive thirst.  · Light-headedness.  · Fruity or sweet-smelling breath.  · Excessive urination.  · Vision changes.  · Confusion or irritability.  · Nausea.  · Vomiting.  · Rapid breathing.  · Abdominal pain.  · Feeling flushed.  How is this diagnosed?  This condition is diagnosed based on a medical history, a physical exam, and blood tests. You may also have a urine test that checks for ketones.  How is this treated?  This condition may be treated with:  · Fluid replacement. This may be done to correct dehydration.  · Insulin injections. These may be given through the skin or through an IV tube.  · Electrolyte replacement. Electrolytes, such as potassium and sodium, may be given in pill form or through an IV tube.  · Antibiotic medicines. These may be prescribed if your condition was caused by an infection.  Follow these instructions at home:  Eating and drinking  · Drink enough fluids to keep your urine clear or pale yellow.  · If you cannot eat, alternate between drinking fluids with sugar (such as juice) and salty fluids (such as broth or bouillon).  · If you can eat, follow your usual diet and drink  sugar-free liquids, such as water.  Other Instructions   · Take insulin as directed by your health care provider. Do not skip insulin injections. Do not use  insulin.  · If your blood sugar is over 240 mg/dL, monitor your urine ketones every 4-6 hours.  · If you were prescribed an antibiotic medicine, finish all of it even if you start to feel better.  · Rest and exercise only as directed by your health care provider.  · If you get sick, call your health care provider and begin treatment quickly. Your body often needs extra insulin to fight an illness.  · Check your blood glucose levels regularly. If your blood glucose is high, drink plenty of fluids. This helps to flush out ketones.  Contact a health care provider if:  · Your blood glucose level is too high or too low.  · You have ketones in your urine.  · You have a fever.  · You cannot eat.  · You cannot tolerate fluids.  · You have been vomiting for more than 2 hours.  · You continue to have symptoms of this condition.  · You develop new symptoms.  Get help right away if:  · Your blood glucose levels continue to be high (elevated).  · Your monitor reads “high” even when you are taking insulin.  · You faint.  · You have chest pain.  · You have trouble breathing.  · You have a sudden, severe headache.  · You have sudden weakness in one arm or one leg.  · You have sudden trouble speaking or swallowing.  · You have vomiting or diarrhea that gets worse after 3 hours.  · You feel severely fatigued.  · You have trouble thinking.  · You have abdominal pain.  · You are severely dehydrated. Symptoms of severe dehydration include:  ¨ Extreme thirst.  ¨ Dry mouth.  ¨ Blue lips.  ¨ Cold hands and feet.  ¨ Rapid breathing.  This information is not intended to replace advice given to you by your health care provider. Make sure you discuss any questions you have with your health care provider.  Document Released: 12/15/2001 Document Revised: 2017 Document Reviewed:  11/25/2015  [employer] Patient Education © 2017 [employer]  Discharge Instructions    Discharged to home by car with relative. Discharged via walking, hospital escort: Refused.  Special equipment needed: Not Applicable    Be sure to schedule a follow-up appointment with your primary care doctor or any specialists as instructed.     Discharge Plan:   Diet Plan: Discussed  Activity Level: Discussed  Confirmed Follow up Appointment: Appointment Scheduled  Confirmed Symptoms Management: Discussed  Medication Reconciliation Updated: Yes  Influenza Vaccine Indication: Not indicated: Previously immunized this influenza season and > 8 years of age    I understand that a diet low in cholesterol, fat, and sodium is recommended for good health. Unless I have been given specific instructions below for another diet, I accept this instruction as my diet prescription.   Other diet: Diabetic    Special Instructions: None    · Is patient discharged on Warfarin / Coumadin?   No     Depression / Suicide Risk    As you are discharged from this [facility] facility, it is important to learn how to keep safe from harming yourself.    Recognize the warning signs:  · Abrupt changes in personality, positive or negative- including increase in energy   · Giving away possessions  · Change in eating patterns- significant weight changes-  positive or negative  · Change in sleeping patterns- unable to sleep or sleeping all the time   · Unwillingness or inability to communicate  · Depression  · Unusual sadness, discouragement and loneliness  · Talk of wanting to die  · Neglect of personal appearance   · Rebelliousness- reckless behavior  · Withdrawal from people/activities they love  · Confusion- inability to concentrate     If you or a loved one observes any of these behaviors or has concerns about self-harm, here's what you can do:  · Talk about it- your feelings and reasons for harming yourself  · Remove any means that you might  use to hurt yourself (examples: pills, rope, extension cords, firearm)  · Get professional help from the community (Mental Health, Substance Abuse, psychological counseling)  · Do not be alone:Call your Safe Contact- someone whom you trust who will be there for you.  · Call your local CRISIS HOTLINE 309-2541 or 376-928-6177  · Call your local Children's Mobile Crisis Response Team [place] (214) 945-8919 or www.[employer]  · Call the toll free National Suicide Prevention Hotlines   · National Suicide Prevention Lifeline 465-816-HBSP (0947)  · National Hope Line Network 800-SUICIDE (433-0393)

## 2018-07-07 NOTE — DISCHARGE SUMMARY
Discharge Summary    CHIEF COMPLAINT ON ADMISSION  Chief Complaint   Patient presents with   • Abdominal Pain     mid abdominal pain sicne this am   • Nausea       Reason for Admission  EMS R=03     Admission Date  7/3/2018    CODE STATUS  Full Code    HPI & HOSPITAL COURSE  This is a 27 y.o. Type 1 diabetic female here with nausea and vomiting. She has had multiple admissions for DKA and presented with the same. She was admitted to the ICU for insulin drip, fluids and close monitoring. Once her gap closed and her acidosis resolved, she was transferred to a medical floor to continue management. During her hospitalization, GI was consulted for re-evaluation and recommendations. She had been following up with them and has presumed DM gastroparesis, although gastric emptying study in 2014 was normal. She was also diagnosed with pyloric stenosis s/p dilation in June 2018. Since that time she has been intermittently on Reglan which she states has helped her symptoms and allowed her to eat. There was discussion of a possible J tube placement to allow for adequate nutrition during exacerbations but the patient and her mother declined. Her diet was advanced and she tolerated it well. She was eager for discharge as she felt she was at her baseline.       Therefore, she is discharged in good and stable condition to home with close outpatient follow-up.    Discussed side effects of Reglan that include tardive dyskinesia and that it should be limited to short periods of time with a flare.    The patient met 2-midnight criteria for an inpatient stay at the time of discharge.    Discharge Date  7/7/2018    FOLLOW UP ITEMS POST DISCHARGE  fecal elastase fecal fat and urine porphyria screen pending at the time of discharge     DISCHARGE DIAGNOSES  Principal Problem (Resolved):    Diabetic ketoacidosis (HCC) POA: Yes  Active Problems:    Gastroparesis due to DM (HCC) POA: Yes    Type 1 diabetes mellitus (HCC) POA: Yes    Esophagitis  POA: Yes    Pyloric stenosis (Chronic) POA: Yes  Resolved Problems:    Intractable nausea and vomiting POA: Yes    Hyponatremia POA: Yes      FOLLOW UP  Future Appointments  Date Time Provider Department Center   8/30/2018 10:00 AM Jose Castro M.D. [facility] None     Navi Huber M.D.  [address]  [place] [address]  [phone]      Office will call you to schedule your follow up appointment, [facility]  left message thank you       MEDICATIONS ON DISCHARGE     Medication List      CONTINUE taking these medications      Instructions   atorvastatin 20 MG Tabs  Commonly known as:  LIPITOR   Take 1 Tab by mouth every day.  Dose:  20 mg     BASAGLAR KWIKPEN 100 UNIT/ML Sopn   Inject 33 Units as instructed 2 Times a Day.  Dose:  33 Units     Cholecalciferol 2000 UNIT Caps   Take 1 Cap by mouth every day.  Dose:  1 Cap     ferrous sulfate 325 (65 Fe) MG tablet   Take 325 mg by mouth every day.  Dose:  325 mg     gabapentin 100 MG Caps  Commonly known as:  NEURONTIN   Take 1 Cap by mouth 2 Times a Day.  Dose:  100 mg     insulin glulisine 100 UNIT/ML Sopn injection  Commonly known as:  APIDRA   Inject 2-10 Units as instructed 3 times a day, with meals. FSBS base 150 For every 50 increase in blood sugar insulin doubles 200 = 2 units 250 = 4 units 300 = 8 units 350 =  16 units 400 = 32 units  Dose:  2-10 Units     metoclopramide 10 MG/10ML Soln  Commonly known as:  REGLAN   Take 10 mg by mouth 3 times a day before meals.  Dose:  10 mg     OLANZapine 5 MG Tabs  Commonly known as:  ZYPREXA   Take 1 Tab by mouth every evening.  Dose:  5 mg     omeprazole 20 MG delayed-release capsule  Commonly known as:  PRILOSEC   Take 1 Cap by mouth every day.  Dose:  20 mg     senna-docusate 8.6-50 MG Tabs  Commonly known as:  PERICOLACE or SENOKOT S   Take 2 Tabs by mouth 2 Times a Day.  Dose:  2 Tab     sucralfate 1 GM/10ML Susp  Commonly known as:  CARAFATE   Take 10 mL by mouth every 6 hours.  Dose:  1 g         STOP taking these medications    ibuprofen 400 MG Tabs  Commonly known as:  MOTRIN     tramadol 50 MG Tabs  Commonly known as:  ULTRAM            Allergies  Allergies   Allergen Reactions   • Pcn [Penicillins] Shortness of Breath and Swelling     Per patient's Mom, patient tolerates Keflex   • Lisinopril Unspecified     Per historical: Reported pedal swelling. No facial/angioedema or rash nor respiratory symptoms.    • Promethazine Vomiting       DIET  Orders Placed This Encounter   Procedures   • Diet Order Diabetic     Standing Status:   Standing     Number of Occurrences:   1     Order Specific Question:   Diet:     Answer:   Diabetic [3]       ACTIVITY  As tolerated.  Weight bearing as tolerated    CONSULTATIONS  GI    PROCEDURES  No orders to display         LABORATORY  Lab Results   Component Value Date    SODIUM 139 07/07/2018    POTASSIUM 3.3 (L) 07/07/2018    CHLORIDE 109 07/07/2018    CO2 24 07/07/2018    GLUCOSE 93 07/07/2018    BUN 5 (L) 07/07/2018    CREATININE 0.43 (L) 07/07/2018        Lab Results   Component Value Date    WBC 4.7 (L) 07/07/2018    HEMOGLOBIN 11.2 (L) 07/07/2018    HEMATOCRIT 34.0 (L) 07/07/2018    PLATELETCT 204 07/07/2018        Total time of the discharge process exceeds 35 minutes.

## 2018-07-07 NOTE — PROGRESS NOTES
Received pt, alert and oriented x4, VSS on RA. Pt sitting upright in bed, IVF infusing. No needs at this time, will continue to monitor and reassess. Bed locked, lowest position, side rails 2/4 up and call light in reach.

## 2018-07-07 NOTE — PROGRESS NOTES
All appropriate discharge paperwork reviewed, signed and copies given to pt, receptive to education and states understanding. Health status improved and pt aware and agreeable with dc home plan. IV catheter discontinued intact without problems. All belongings taken. Discharged to home. Chose to ambulate and accompanied by mom.

## 2018-07-10 LAB — ELASTASE PANC STL-MCNT: >500 UG/G

## 2018-07-11 LAB
COPRO1/CREAT UR-SRTO: 6 UMOL/MOL CRT (ref 0–6)
COPRO3/CREAT UR-SRTO: 12 UMOL/MOL CRT (ref 0–14)
CREAT 24H UR-MCNC: 22 MG/DL
CREAT 24H UR-MRATE: NORMAL MG/D (ref 700–1600)
HEPTACARBOXYLATE/CREAT UR-SRTO: 0 UMOL/MOL CRT (ref 0–2)
PBG 24H UR-SRATE: NORMAL UMOL/D (ref 0–11)
PBG UR-SCNC: <3 UMOL/L (ref 0–8.8)
PORPHYRIN FRACT 24H UR-IMP: NORMAL
TOTAL VOLUME 1105: NORMAL ML
UROPOR/CREAT 24H UR-SRTO: <3 UMOL/MOL CRT (ref 0–4)

## 2018-11-03 ENCOUNTER — HOSPITAL ENCOUNTER (INPATIENT)
Facility: MEDICAL CENTER | Age: 28
LOS: 3 days | DRG: 638 | End: 2018-11-06
Attending: EMERGENCY MEDICINE | Admitting: HOSPITALIST
Payer: MEDICAID

## 2018-11-03 DIAGNOSIS — E86.0 DEHYDRATION: ICD-10-CM

## 2018-11-03 DIAGNOSIS — E10.10 DIABETIC KETOACIDOSIS WITHOUT COMA ASSOCIATED WITH TYPE 1 DIABETES MELLITUS (HCC): ICD-10-CM

## 2018-11-03 PROBLEM — E87.29 HIGH ANION GAP METABOLIC ACIDOSIS: Status: ACTIVE | Noted: 2018-11-03

## 2018-11-03 PROBLEM — R31.9 HEMATURIA: Status: ACTIVE | Noted: 2018-11-03

## 2018-11-03 PROBLEM — R65.10 SIRS (SYSTEMIC INFLAMMATORY RESPONSE SYNDROME) (HCC): Status: ACTIVE | Noted: 2018-11-03

## 2018-11-03 LAB
ALBUMIN SERPL BCP-MCNC: 4.8 G/DL (ref 3.2–4.9)
ALBUMIN/GLOB SERPL: 1.8 G/DL
ALP SERPL-CCNC: 130 U/L (ref 30–99)
ALT SERPL-CCNC: 12 U/L (ref 2–50)
ANION GAP SERPL CALC-SCNC: 11 MMOL/L (ref 0–11.9)
ANION GAP SERPL CALC-SCNC: 14 MMOL/L (ref 0–11.9)
ANION GAP SERPL CALC-SCNC: 19 MMOL/L (ref 0–11.9)
ANION GAP SERPL CALC-SCNC: 22 MMOL/L (ref 0–11.9)
APPEARANCE UR: CLEAR
AST SERPL-CCNC: 10 U/L (ref 12–45)
B-OH-BUTYR SERPL-MCNC: >8 MMOL/L (ref 0.02–0.27)
BACTERIA #/AREA URNS HPF: ABNORMAL /HPF
BASE EXCESS BLDA CALC-SCNC: -23 MMOL/L (ref -4–3)
BASOPHILS # BLD AUTO: 0.8 % (ref 0–1.8)
BASOPHILS # BLD: 0.05 K/UL (ref 0–0.12)
BILIRUB SERPL-MCNC: 0.5 MG/DL (ref 0.1–1.5)
BILIRUB UR QL STRIP.AUTO: NEGATIVE
BODY TEMPERATURE: ABNORMAL CENTIGRADE
BUN SERPL-MCNC: 11 MG/DL (ref 8–22)
BUN SERPL-MCNC: 12 MG/DL (ref 8–22)
BUN SERPL-MCNC: 14 MG/DL (ref 8–22)
BUN SERPL-MCNC: 14 MG/DL (ref 8–22)
CALCIUM SERPL-MCNC: 7.8 MG/DL (ref 8.5–10.5)
CALCIUM SERPL-MCNC: 8 MG/DL (ref 8.5–10.5)
CALCIUM SERPL-MCNC: 8.1 MG/DL (ref 8.5–10.5)
CALCIUM SERPL-MCNC: 9.1 MG/DL (ref 8.5–10.5)
CHLORIDE SERPL-SCNC: 105 MMOL/L (ref 96–112)
CHLORIDE SERPL-SCNC: 114 MMOL/L (ref 96–112)
CHLORIDE SERPL-SCNC: 120 MMOL/L (ref 96–112)
CHLORIDE SERPL-SCNC: 123 MMOL/L (ref 96–112)
CO2 SERPL-SCNC: 6 MMOL/L (ref 20–33)
CO2 SERPL-SCNC: 8 MMOL/L (ref 20–33)
CO2 SERPL-SCNC: 8 MMOL/L (ref 20–33)
CO2 SERPL-SCNC: 9 MMOL/L (ref 20–33)
COLOR UR: YELLOW
CREAT SERPL-MCNC: 0.6 MG/DL (ref 0.5–1.4)
CREAT SERPL-MCNC: 0.66 MG/DL (ref 0.5–1.4)
CREAT SERPL-MCNC: 0.69 MG/DL (ref 0.5–1.4)
CREAT SERPL-MCNC: 0.86 MG/DL (ref 0.5–1.4)
EKG IMPRESSION: NORMAL
EOSINOPHIL # BLD AUTO: 0.18 K/UL (ref 0–0.51)
EOSINOPHIL NFR BLD: 3 % (ref 0–6.9)
EPI CELLS #/AREA URNS HPF: ABNORMAL /HPF
ERYTHROCYTE [DISTWIDTH] IN BLOOD BY AUTOMATED COUNT: 44.4 FL (ref 35.9–50)
FLUAV RNA SPEC QL NAA+PROBE: NEGATIVE
FLUBV RNA SPEC QL NAA+PROBE: NEGATIVE
GLOBULIN SER CALC-MCNC: 2.6 G/DL (ref 1.9–3.5)
GLUCOSE BLD-MCNC: 133 MG/DL (ref 65–99)
GLUCOSE BLD-MCNC: 139 MG/DL (ref 65–99)
GLUCOSE BLD-MCNC: 141 MG/DL (ref 65–99)
GLUCOSE BLD-MCNC: 148 MG/DL (ref 65–99)
GLUCOSE BLD-MCNC: 155 MG/DL (ref 65–99)
GLUCOSE BLD-MCNC: 159 MG/DL (ref 65–99)
GLUCOSE BLD-MCNC: 179 MG/DL (ref 65–99)
GLUCOSE BLD-MCNC: 213 MG/DL (ref 65–99)
GLUCOSE BLD-MCNC: 248 MG/DL (ref 65–99)
GLUCOSE BLD-MCNC: 291 MG/DL (ref 65–99)
GLUCOSE BLD-MCNC: 312 MG/DL (ref 65–99)
GLUCOSE BLD-MCNC: 347 MG/DL (ref 65–99)
GLUCOSE BLD-MCNC: 447 MG/DL (ref 65–99)
GLUCOSE SERPL-MCNC: 162 MG/DL (ref 65–99)
GLUCOSE SERPL-MCNC: 205 MG/DL (ref 65–99)
GLUCOSE SERPL-MCNC: 393 MG/DL (ref 65–99)
GLUCOSE SERPL-MCNC: 475 MG/DL (ref 65–99)
GLUCOSE UR STRIP.AUTO-MCNC: >=1000 MG/DL
HCO3 BLDA-SCNC: 5 MMOL/L (ref 17–25)
HCT VFR BLD AUTO: 47 % (ref 37–47)
HGB BLD-MCNC: 15.7 G/DL (ref 12–16)
HYALINE CASTS #/AREA URNS LPF: ABNORMAL /LPF
IMM GRANULOCYTES # BLD AUTO: 0.04 K/UL (ref 0–0.11)
IMM GRANULOCYTES NFR BLD AUTO: 0.7 % (ref 0–0.9)
KETONES UR STRIP.AUTO-MCNC: >=160 MG/DL
LEUKOCYTE ESTERASE UR QL STRIP.AUTO: NEGATIVE
LIPASE SERPL-CCNC: 11 U/L (ref 11–82)
LYMPHOCYTES # BLD AUTO: 2.4 K/UL (ref 1–4.8)
LYMPHOCYTES NFR BLD: 39.6 % (ref 22–41)
MAGNESIUM SERPL-MCNC: 1.7 MG/DL (ref 1.5–2.5)
MAGNESIUM SERPL-MCNC: 2.2 MG/DL (ref 1.5–2.5)
MCH RBC QN AUTO: 29.2 PG (ref 27–33)
MCHC RBC AUTO-ENTMCNC: 33.4 G/DL (ref 33.6–35)
MCV RBC AUTO: 87.4 FL (ref 81.4–97.8)
MICRO URNS: ABNORMAL
MONOCYTES # BLD AUTO: 0.56 K/UL (ref 0–0.85)
MONOCYTES NFR BLD AUTO: 9.2 % (ref 0–13.4)
NEUTROPHILS # BLD AUTO: 2.83 K/UL (ref 2–7.15)
NEUTROPHILS NFR BLD: 46.7 % (ref 44–72)
NITRITE UR QL STRIP.AUTO: NEGATIVE
NRBC # BLD AUTO: 0 K/UL
NRBC BLD-RTO: 0 /100 WBC
PCO2 BLDA: 17.9 MMHG (ref 26–37)
PH BLDA: 7.08 [PH] (ref 7.4–7.5)
PH UR STRIP.AUTO: 5 [PH]
PHOSPHATE SERPL-MCNC: 1.4 MG/DL (ref 2.5–4.5)
PHOSPHATE SERPL-MCNC: 3.1 MG/DL (ref 2.5–4.5)
PLATELET # BLD AUTO: 222 K/UL (ref 164–446)
PMV BLD AUTO: 10.1 FL (ref 9–12.9)
PO2 BLDA: 62.1 MMHG (ref 64–87)
POTASSIUM SERPL-SCNC: 4 MMOL/L (ref 3.6–5.5)
POTASSIUM SERPL-SCNC: 4.1 MMOL/L (ref 3.6–5.5)
POTASSIUM SERPL-SCNC: 4.2 MMOL/L (ref 3.6–5.5)
POTASSIUM SERPL-SCNC: 4.3 MMOL/L (ref 3.6–5.5)
PROT SERPL-MCNC: 7.4 G/DL (ref 6–8.2)
PROT UR QL STRIP: 100 MG/DL
RBC # BLD AUTO: 5.38 M/UL (ref 4.2–5.4)
RBC # URNS HPF: >150 /HPF
RBC UR QL AUTO: ABNORMAL
SAO2 % BLDA: 88.4 % (ref 93–99)
SODIUM SERPL-SCNC: 135 MMOL/L (ref 135–145)
SODIUM SERPL-SCNC: 139 MMOL/L (ref 135–145)
SODIUM SERPL-SCNC: 142 MMOL/L (ref 135–145)
SODIUM SERPL-SCNC: 143 MMOL/L (ref 135–145)
SP GR UR STRIP.AUTO: 1.03
UROBILINOGEN UR STRIP.AUTO-MCNC: 0.2 MG/DL
WBC # BLD AUTO: 6.1 K/UL (ref 4.8–10.8)
WBC #/AREA URNS HPF: ABNORMAL /HPF

## 2018-11-03 PROCEDURE — 700105 HCHG RX REV CODE 258: Performed by: HOSPITALIST

## 2018-11-03 PROCEDURE — 700111 HCHG RX REV CODE 636 W/ 250 OVERRIDE (IP): Performed by: EMERGENCY MEDICINE

## 2018-11-03 PROCEDURE — 96375 TX/PRO/DX INJ NEW DRUG ADDON: CPT

## 2018-11-03 PROCEDURE — 96376 TX/PRO/DX INJ SAME DRUG ADON: CPT

## 2018-11-03 PROCEDURE — 700111 HCHG RX REV CODE 636 W/ 250 OVERRIDE (IP): Performed by: HOSPITALIST

## 2018-11-03 PROCEDURE — A9270 NON-COVERED ITEM OR SERVICE: HCPCS | Performed by: HOSPITALIST

## 2018-11-03 PROCEDURE — 700105 HCHG RX REV CODE 258: Performed by: EMERGENCY MEDICINE

## 2018-11-03 PROCEDURE — 700111 HCHG RX REV CODE 636 W/ 250 OVERRIDE (IP): Performed by: INTERNAL MEDICINE

## 2018-11-03 PROCEDURE — 85025 COMPLETE CBC W/AUTO DIFF WBC: CPT

## 2018-11-03 PROCEDURE — 700111 HCHG RX REV CODE 636 W/ 250 OVERRIDE (IP)

## 2018-11-03 PROCEDURE — 83690 ASSAY OF LIPASE: CPT

## 2018-11-03 PROCEDURE — 93005 ELECTROCARDIOGRAM TRACING: CPT | Performed by: EMERGENCY MEDICINE

## 2018-11-03 PROCEDURE — 83735 ASSAY OF MAGNESIUM: CPT | Mod: 91

## 2018-11-03 PROCEDURE — 80048 BASIC METABOLIC PNL TOTAL CA: CPT | Mod: 91

## 2018-11-03 PROCEDURE — 700102 HCHG RX REV CODE 250 W/ 637 OVERRIDE(OP): Performed by: HOSPITALIST

## 2018-11-03 PROCEDURE — 84100 ASSAY OF PHOSPHORUS: CPT | Mod: 91

## 2018-11-03 PROCEDURE — 94760 N-INVAS EAR/PLS OXIMETRY 1: CPT

## 2018-11-03 PROCEDURE — 99223 1ST HOSP IP/OBS HIGH 75: CPT | Performed by: HOSPITALIST

## 2018-11-03 PROCEDURE — 96374 THER/PROPH/DIAG INJ IV PUSH: CPT

## 2018-11-03 PROCEDURE — 82803 BLOOD GASES ANY COMBINATION: CPT

## 2018-11-03 PROCEDURE — 700101 HCHG RX REV CODE 250: Performed by: HOSPITALIST

## 2018-11-03 PROCEDURE — 81001 URINALYSIS AUTO W/SCOPE: CPT

## 2018-11-03 PROCEDURE — 87502 INFLUENZA DNA AMP PROBE: CPT

## 2018-11-03 PROCEDURE — 80053 COMPREHEN METABOLIC PANEL: CPT

## 2018-11-03 PROCEDURE — 82010 KETONE BODYS QUAN: CPT

## 2018-11-03 PROCEDURE — 99291 CRITICAL CARE FIRST HOUR: CPT | Performed by: INTERNAL MEDICINE

## 2018-11-03 PROCEDURE — 82962 GLUCOSE BLOOD TEST: CPT

## 2018-11-03 PROCEDURE — 36415 COLL VENOUS BLD VENIPUNCTURE: CPT

## 2018-11-03 PROCEDURE — 99291 CRITICAL CARE FIRST HOUR: CPT

## 2018-11-03 PROCEDURE — 770022 HCHG ROOM/CARE - ICU (200)

## 2018-11-03 RX ORDER — DEXTROSE AND SODIUM CHLORIDE 10; .45 G/100ML; G/100ML
INJECTION, SOLUTION INTRAVENOUS CONTINUOUS
Status: DISCONTINUED | OUTPATIENT
Start: 2018-11-03 | End: 2018-11-04

## 2018-11-03 RX ORDER — POTASSIUM CHLORIDE 7.45 MG/ML
10 INJECTION INTRAVENOUS
Status: COMPLETED | OUTPATIENT
Start: 2018-11-03 | End: 2018-11-03

## 2018-11-03 RX ORDER — SODIUM CHLORIDE 9 MG/ML
2000 INJECTION, SOLUTION INTRAVENOUS ONCE
Status: COMPLETED | OUTPATIENT
Start: 2018-11-03 | End: 2018-11-03

## 2018-11-03 RX ORDER — SODIUM CHLORIDE, SODIUM LACTATE, POTASSIUM CHLORIDE, AND CALCIUM CHLORIDE .6; .31; .03; .02 G/100ML; G/100ML; G/100ML; G/100ML
2000 INJECTION, SOLUTION INTRAVENOUS ONCE
Status: COMPLETED | OUTPATIENT
Start: 2018-11-03 | End: 2018-11-03

## 2018-11-03 RX ORDER — ONDANSETRON 4 MG/1
4 TABLET, ORALLY DISINTEGRATING ORAL EVERY 4 HOURS PRN
Status: DISCONTINUED | OUTPATIENT
Start: 2018-11-03 | End: 2018-11-06 | Stop reason: HOSPADM

## 2018-11-03 RX ORDER — AMOXICILLIN 250 MG
2 CAPSULE ORAL 2 TIMES DAILY
Status: DISCONTINUED | OUTPATIENT
Start: 2018-11-03 | End: 2018-11-06 | Stop reason: HOSPADM

## 2018-11-03 RX ORDER — PROMETHAZINE HYDROCHLORIDE 25 MG/1
12.5-25 TABLET ORAL EVERY 4 HOURS PRN
Status: DISCONTINUED | OUTPATIENT
Start: 2018-11-03 | End: 2018-11-06 | Stop reason: HOSPADM

## 2018-11-03 RX ORDER — SUCRALFATE ORAL 1 G/10ML
1 SUSPENSION ORAL EVERY 6 HOURS
Status: DISCONTINUED | OUTPATIENT
Start: 2018-11-03 | End: 2018-11-03

## 2018-11-03 RX ORDER — ONDANSETRON 2 MG/ML
4 INJECTION INTRAMUSCULAR; INTRAVENOUS EVERY 4 HOURS PRN
Status: DISCONTINUED | OUTPATIENT
Start: 2018-11-03 | End: 2018-11-06 | Stop reason: HOSPADM

## 2018-11-03 RX ORDER — MAGNESIUM SULFATE HEPTAHYDRATE 40 MG/ML
4 INJECTION, SOLUTION INTRAVENOUS
Status: COMPLETED | OUTPATIENT
Start: 2018-11-03 | End: 2018-11-03

## 2018-11-03 RX ORDER — AMOXICILLIN 250 MG
2 CAPSULE ORAL 2 TIMES DAILY
Status: DISCONTINUED | OUTPATIENT
Start: 2018-11-03 | End: 2018-11-03

## 2018-11-03 RX ORDER — POLYETHYLENE GLYCOL 3350 17 G/17G
1 POWDER, FOR SOLUTION ORAL
Status: DISCONTINUED | OUTPATIENT
Start: 2018-11-03 | End: 2018-11-06 | Stop reason: HOSPADM

## 2018-11-03 RX ORDER — SODIUM CHLORIDE 9 MG/ML
INJECTION, SOLUTION INTRAVENOUS CONTINUOUS
Status: DISCONTINUED | OUTPATIENT
Start: 2018-11-03 | End: 2018-11-03

## 2018-11-03 RX ORDER — DEXTROSE AND SODIUM CHLORIDE 5; .9 G/100ML; G/100ML
INJECTION, SOLUTION INTRAVENOUS CONTINUOUS
Status: DISCONTINUED | OUTPATIENT
Start: 2018-11-03 | End: 2018-11-03

## 2018-11-03 RX ORDER — ONDANSETRON 2 MG/ML
4 INJECTION INTRAMUSCULAR; INTRAVENOUS ONCE
Status: COMPLETED | OUTPATIENT
Start: 2018-11-03 | End: 2018-11-03

## 2018-11-03 RX ORDER — OMEPRAZOLE 20 MG/1
20 CAPSULE, DELAYED RELEASE ORAL DAILY
Status: DISCONTINUED | OUTPATIENT
Start: 2018-11-03 | End: 2018-11-06 | Stop reason: HOSPADM

## 2018-11-03 RX ORDER — MAGNESIUM SULFATE HEPTAHYDRATE 40 MG/ML
2 INJECTION, SOLUTION INTRAVENOUS
Status: COMPLETED | OUTPATIENT
Start: 2018-11-03 | End: 2018-11-03

## 2018-11-03 RX ORDER — HYDROMORPHONE HYDROCHLORIDE 1 MG/ML
.5-2 INJECTION, SOLUTION INTRAMUSCULAR; INTRAVENOUS; SUBCUTANEOUS EVERY 4 HOURS PRN
Status: DISCONTINUED | OUTPATIENT
Start: 2018-11-03 | End: 2018-11-06 | Stop reason: HOSPADM

## 2018-11-03 RX ORDER — HYDROMORPHONE HYDROCHLORIDE 2 MG/ML
INJECTION, SOLUTION INTRAMUSCULAR; INTRAVENOUS; SUBCUTANEOUS
Status: ACTIVE
Start: 2018-11-03 | End: 2018-11-04

## 2018-11-03 RX ORDER — OLANZAPINE 5 MG/1
5 TABLET ORAL EVERY EVENING
Status: DISCONTINUED | OUTPATIENT
Start: 2018-11-03 | End: 2018-11-03

## 2018-11-03 RX ORDER — FERROUS SULFATE 325(65) MG
325 TABLET ORAL DAILY
Status: DISCONTINUED | OUTPATIENT
Start: 2018-11-04 | End: 2018-11-06 | Stop reason: HOSPADM

## 2018-11-03 RX ORDER — ACETAMINOPHEN 325 MG/1
650 TABLET ORAL EVERY 6 HOURS PRN
Status: DISCONTINUED | OUTPATIENT
Start: 2018-11-03 | End: 2018-11-06 | Stop reason: HOSPADM

## 2018-11-03 RX ORDER — ATORVASTATIN CALCIUM 20 MG/1
20 TABLET, FILM COATED ORAL
Status: DISCONTINUED | OUTPATIENT
Start: 2018-11-03 | End: 2018-11-06 | Stop reason: HOSPADM

## 2018-11-03 RX ORDER — BISACODYL 10 MG
10 SUPPOSITORY, RECTAL RECTAL
Status: DISCONTINUED | OUTPATIENT
Start: 2018-11-03 | End: 2018-11-06 | Stop reason: HOSPADM

## 2018-11-03 RX ORDER — HEPARIN SODIUM 5000 [USP'U]/ML
5000 INJECTION, SOLUTION INTRAVENOUS; SUBCUTANEOUS EVERY 8 HOURS
Status: DISCONTINUED | OUTPATIENT
Start: 2018-11-03 | End: 2018-11-04

## 2018-11-03 RX ORDER — HYDROMORPHONE HYDROCHLORIDE 1 MG/ML
1 INJECTION, SOLUTION INTRAMUSCULAR; INTRAVENOUS; SUBCUTANEOUS ONCE
Status: COMPLETED | OUTPATIENT
Start: 2018-11-03 | End: 2018-11-03

## 2018-11-03 RX ORDER — LABETALOL HYDROCHLORIDE 5 MG/ML
10 INJECTION, SOLUTION INTRAVENOUS EVERY 4 HOURS PRN
Status: DISCONTINUED | OUTPATIENT
Start: 2018-11-03 | End: 2018-11-06 | Stop reason: HOSPADM

## 2018-11-03 RX ORDER — METOCLOPRAMIDE HYDROCHLORIDE 5 MG/5ML
10 SOLUTION ORAL
Status: DISCONTINUED | OUTPATIENT
Start: 2018-11-03 | End: 2018-11-06 | Stop reason: HOSPADM

## 2018-11-03 RX ORDER — PROMETHAZINE HYDROCHLORIDE 25 MG/1
12.5-25 SUPPOSITORY RECTAL EVERY 4 HOURS PRN
Status: DISCONTINUED | OUTPATIENT
Start: 2018-11-03 | End: 2018-11-06 | Stop reason: HOSPADM

## 2018-11-03 RX ORDER — GABAPENTIN 100 MG/1
100 CAPSULE ORAL 2 TIMES DAILY
Status: DISCONTINUED | OUTPATIENT
Start: 2018-11-03 | End: 2018-11-06 | Stop reason: HOSPADM

## 2018-11-03 RX ADMIN — PROCHLORPERAZINE EDISYLATE 10 MG: 5 INJECTION INTRAMUSCULAR; INTRAVENOUS at 20:13

## 2018-11-03 RX ADMIN — HYDROMORPHONE HYDROCHLORIDE 1 MG: 1 INJECTION, SOLUTION INTRAMUSCULAR; INTRAVENOUS; SUBCUTANEOUS at 09:46

## 2018-11-03 RX ADMIN — HEPARIN SODIUM 5000 UNITS: 5000 INJECTION, SOLUTION INTRAVENOUS; SUBCUTANEOUS at 23:10

## 2018-11-03 RX ADMIN — HYDROMORPHONE HYDROCHLORIDE 1 MG: 1 INJECTION, SOLUTION INTRAMUSCULAR; INTRAVENOUS; SUBCUTANEOUS at 12:21

## 2018-11-03 RX ADMIN — GABAPENTIN 100 MG: 100 CAPSULE ORAL at 17:10

## 2018-11-03 RX ADMIN — METOCLOPRAMIDE HYDROCHLORIDE 10 MG: 5 SOLUTION ORAL at 17:07

## 2018-11-03 RX ADMIN — HYDROMORPHONE HYDROCHLORIDE 2 MG: 1 INJECTION, SOLUTION INTRAMUSCULAR; INTRAVENOUS; SUBCUTANEOUS at 17:05

## 2018-11-03 RX ADMIN — PROCHLORPERAZINE EDISYLATE 10 MG: 5 INJECTION INTRAMUSCULAR; INTRAVENOUS at 11:52

## 2018-11-03 RX ADMIN — SODIUM CHLORIDE 4 UNITS/HR: 9 INJECTION, SOLUTION INTRAVENOUS at 12:06

## 2018-11-03 RX ADMIN — SODIUM CHLORIDE 2000 ML: 9 INJECTION, SOLUTION INTRAVENOUS at 09:42

## 2018-11-03 RX ADMIN — ATORVASTATIN CALCIUM 20 MG: 20 TABLET, FILM COATED ORAL at 23:10

## 2018-11-03 RX ADMIN — DEXTROSE AND SODIUM CHLORIDE: 10; .45 INJECTION, SOLUTION INTRAVENOUS at 20:01

## 2018-11-03 RX ADMIN — SODIUM CHLORIDE, POTASSIUM CHLORIDE, SODIUM LACTATE AND CALCIUM CHLORIDE 2000 ML: 600; 310; 30; 20 INJECTION, SOLUTION INTRAVENOUS at 12:00

## 2018-11-03 RX ADMIN — ONDANSETRON 4 MG: 2 INJECTION INTRAMUSCULAR; INTRAVENOUS at 17:02

## 2018-11-03 RX ADMIN — MAGNESIUM SULFATE IN WATER 2 G: 40 INJECTION, SOLUTION INTRAVENOUS at 18:49

## 2018-11-03 RX ADMIN — POTASSIUM CHLORIDE 10 MEQ: 7.46 INJECTION, SOLUTION INTRAVENOUS at 17:55

## 2018-11-03 RX ADMIN — ONDANSETRON HYDROCHLORIDE 4 MG: 2 SOLUTION INTRAMUSCULAR; INTRAVENOUS at 10:36

## 2018-11-03 RX ADMIN — POTASSIUM CHLORIDE 10 MEQ: 7.46 INJECTION, SOLUTION INTRAVENOUS at 16:43

## 2018-11-03 RX ADMIN — POTASSIUM CHLORIDE 10 MEQ: 7.46 INJECTION, SOLUTION INTRAVENOUS at 14:20

## 2018-11-03 RX ADMIN — ONDANSETRON HYDROCHLORIDE 4 MG: 2 SOLUTION INTRAMUSCULAR; INTRAVENOUS at 09:46

## 2018-11-03 RX ADMIN — POTASSIUM CHLORIDE 10 MEQ: 7.46 INJECTION, SOLUTION INTRAVENOUS at 13:16

## 2018-11-03 RX ADMIN — POTASSIUM PHOSPHATE, MONOBASIC AND POTASSIUM PHOSPHATE, DIBASIC 30 MMOL: 224; 236 INJECTION, SOLUTION INTRAVENOUS at 20:49

## 2018-11-03 RX ADMIN — SODIUM CHLORIDE 6 UNITS/HR: 9 INJECTION, SOLUTION INTRAVENOUS at 23:04

## 2018-11-03 RX ADMIN — DEXTROSE AND SODIUM CHLORIDE: 5; 900 INJECTION, SOLUTION INTRAVENOUS at 14:25

## 2018-11-03 ASSESSMENT — COGNITIVE AND FUNCTIONAL STATUS - GENERAL
TOILETING: A LITTLE
SUGGESTED CMS G CODE MODIFIER MOBILITY: CJ
MOBILITY SCORE: 22
WALKING IN HOSPITAL ROOM: A LITTLE
SUGGESTED CMS G CODE MODIFIER DAILY ACTIVITY: CI
CLIMB 3 TO 5 STEPS WITH RAILING: A LITTLE
DAILY ACTIVITIY SCORE: 23

## 2018-11-03 ASSESSMENT — ENCOUNTER SYMPTOMS
SORE THROAT: 0
MYALGIAS: 0
LOSS OF CONSCIOUSNESS: 0
FEVER: 0
PALPITATIONS: 0
HEMOPTYSIS: 0
FOCAL WEAKNESS: 0
PHOTOPHOBIA: 0
HALLUCINATIONS: 0
MYALGIAS: 1
WEAKNESS: 1
FALLS: 0
SINUS PAIN: 0
CHILLS: 0
COUGH: 1
ORTHOPNEA: 0
ABDOMINAL PAIN: 1
DIZZINESS: 0
SORE THROAT: 1
DOUBLE VISION: 0
BACK PAIN: 0
DEPRESSION: 0
DIAPHORESIS: 0
HEARTBURN: 0
SPUTUM PRODUCTION: 0
HEADACHES: 1
NECK PAIN: 0
NERVOUS/ANXIOUS: 0
SHORTNESS OF BREATH: 0
SEIZURES: 0
NAUSEA: 1
SPEECH CHANGE: 0
BRUISES/BLEEDS EASILY: 0
BLURRED VISION: 0
STRIDOR: 0
VOMITING: 1
BLOOD IN STOOL: 0

## 2018-11-03 ASSESSMENT — PAIN SCALES - GENERAL
PAINLEVEL_OUTOF10: 9
PAINLEVEL_OUTOF10: 6
PAINLEVEL_OUTOF10: 6
PAINLEVEL_OUTOF10: 8
PAINLEVEL_OUTOF10: 3
PAINLEVEL_OUTOF10: 6
PAINLEVEL_OUTOF10: 6
PAINLEVEL_OUTOF10: 4
PAINLEVEL_OUTOF10: 5
PAINLEVEL_OUTOF10: ASSUMED PAIN PRESENT

## 2018-11-03 ASSESSMENT — LIFESTYLE VARIABLES: SUBSTANCE_ABUSE: 0

## 2018-11-03 NOTE — CARE PLAN
Problem: Pain Management  Goal: Pain level will decrease to patient's comfort goal  Outcome: PROGRESSING AS EXPECTED  Pt has complaint of abdominal pain, will give PRN dilaudid as indicated.    Problem: Acute Care of the Diabetic Patient  Goal: Optimal Outcome for the Diabetic Patient  Outcome: PROGRESSING AS EXPECTED  Pt admitted with DKA - checking blood sugars every hour and BMP every 4 hours while on protocol.  Currently on insulin gtt at 6units/hr, will titrate per protocol.

## 2018-11-03 NOTE — ED NOTES
Pain reassessed, pt states her pain came down from an 8/10 to a 5/10. Pain goal is stated to be a 4 or 5 out of 10.  Patient is still vomiting. ERP notified. Verbal order of an additional 4mg of IV zofran with readback.

## 2018-11-03 NOTE — PROGRESS NOTES
Antony from Lab called with critical result of CO2=6 at 1235. Critical lab result read back to Antony.   Dr. Us notified of critical lab result at 1250.  Critical lab result read back by Dr. Us.

## 2018-11-03 NOTE — ED NOTES
Lab called with critical result of CO2 8 at 1018. Critical lab result read back to lab.   Dr. Torres notified of critical lab result at 1018.  Critical lab result read back by Dr. Torres.

## 2018-11-03 NOTE — ED NOTES
from Lab called with critical result of ph 7.08 at 1121. Critical lab result read back to lab.   Dr. reyes notified of critical lab result at 1122.  Critical lab result read back by Dr. reyes.

## 2018-11-03 NOTE — ED PROVIDER NOTES
ED Provider Note    Scribed for Jeb Torres M.D. by Thom Burkett. 11/3/2018  9:18 AM    Primary care provider: Navi Huber M.D.  Means of arrival: Wheel Chair  History obtained from: Patient  History limited by: None    CHIEF COMPLAINT  Chief Complaint   Patient presents with   • High Blood Sugar   • N/V       HPI  Alis Sparks is a 28 y.o. female who presents to the Emergency Department for nausea, vomiting, hyperglycemia, and abdominal pain. She has experienced cough, fatigue, malaise, headache, and decreased appetite for the last week. Her blood glucose was not elevated until she checked it at 5:00 AM this morning. Her blood glucose has been in the 400s since then. She vomited once in triage and she is nauseous. Her abdominal pain is diffuse, but worst in her left lower abdomen and flank. She has a history of Gastroparesis and states her abdominal pain today is different from abdominal pain associated with her gastroparesis. She also noticed some mild dysuria this morning. She denies fever. Her last normal menstual period ended two days ago. She denies undergoing any prior abdominal surgeries.    REVIEW OF SYSTEMS  Pertinent positives include nausea, vomiting, hyperglycemia, abdominal pain, cough, fatigue, malaise, headache, dysuria, and decreased appetite.   Pertinent negatives include no fever.    All other systems reviewed and negative. See HPI for further details.     PAST MEDICAL HISTORY   has a past medical history of Backpain; Bronchitis; Diabetes type 1, controlled ([facility]); DKA (diabetic ketoacidoses) ([facility]); Fall; Gastroparesis; Kidney infection; Pneumonia; and UTI (urinary tract infection).    SURGICAL HISTORY   has a past surgical history that includes gastroscopy-endo (9/18/2014); gastroscopy with biopsy (9/18/2014); other; submandible abscess incision and drainage (Left, 11/8/2017); dental extraction(s) (11/8/2017); and gastroscopy-endo (N/A, 6/9/2018).    SOCIAL  HISTORY  Social History   Substance Use Topics   • Smoking status: Never Smoker   • Smokeless tobacco: Never Used   • Alcohol use No      History   Drug Use No       FAMILY HISTORY  Family History   Problem Relation Age of Onset   • Cancer Unknown         breasts, multiple family members   • Hypertension Maternal Grandfather        CURRENT MEDICATIONS  No current facility-administered medications on file prior to encounter.      Current Outpatient Prescriptions on File Prior to Encounter   Medication Sig Dispense Refill   • Insulin Glargine (BASAGLAR KWIKPEN) 100 UNIT/ML Solution Pen-injector Inject 33 Units as instructed 2 Times a Day.     • insulin glulisine (APIDRA) 100 UNIT/ML Solution Pen-injector injection Inject 2-10 Units as instructed 3 times a day, with meals. FSBS base 150  For every 50 increase in blood sugar insulin doubles  200 = 2 units  250 = 4 units  300 = 8 units  350 =  16 units  400 = 32 units     • OLANZapine (ZYPREXA) 5 MG Tab Take 1 Tab by mouth every evening. 30 Tab 2   • senna-docusate (PERICOLACE OR SENOKOT S) 8.6-50 MG Tab Take 2 Tabs by mouth 2 Times a Day. 30 Tab 0   • gabapentin (NEURONTIN) 100 MG Cap Take 1 Cap by mouth 2 Times a Day. 60 Cap 1   • sucralfate (CARAFATE) 1 GM/10ML Suspension Take 10 mL by mouth every 6 hours. 500 mL 1   • omeprazole (PRILOSEC) 20 MG delayed-release capsule Take 1 Cap by mouth every day. 30 Cap 1   • metoclopramide (REGLAN) 10 MG/10ML Solution Take 10 mg by mouth 3 times a day before meals.     • ferrous sulfate 325 (65 Fe) MG tablet Take 325 mg by mouth every day.     • Cholecalciferol 2000 UNIT Cap Take 1 Cap by mouth every day.     • atorvastatin (LIPITOR) 20 MG Tab Take 1 Tab by mouth every day. 30 Tab 0         ALLERGIES  Allergies   Allergen Reactions   • Pcn [Penicillins] Shortness of Breath and Swelling     Per patient's Mom, patient tolerates Keflex   • Lisinopril Unspecified     Per historical: Reported pedal swelling. No facial/angioedema or  "rash nor respiratory symptoms.    • Promethazine Vomiting       PHYSICAL EXAM  VITAL SIGNS: /87   Pulse (!) 135   Temp 36.8 °C (98.3 °F)   Resp 19   Ht 1.651 m (5' 5\")   Wt 82.1 kg (181 lb)   LMP 10/30/2018 (Approximate)   SpO2 98%   BMI 30.12 kg/m²     Nursing note and vitals reviewed.  Constitutional: Well-developed and well-nourished. Chronically ill-appearing  HENT: Head is normocephalic and atraumatic. Dry mucous membranes. Oropharynx is clear without exudate or erythema.   Eyes: Pupils are equal, round, and reactive to light. Conjunctiva are normal.   Cardiovascular: Tachycardic, Regular rhythm. No murmur heard. Normal radial pulses.  Pulmonary/Chest: Slightly tachypnic. Breath sounds normal. No wheezes or rales.   Abdominal: Soft. Mild diffuse abdominal tenderness. No distention    Musculoskeletal: Extremities exhibit normal range of motion without edema or tenderness.   Neurological: Awake, alert and oriented to person, place, and time. No focal deficits noted.  Skin: Skin is warm and dry. No rash.   Psychiatric: Normal mood and affect. Appropriate for clinical situation.    DIAGNOSTIC STUDIES / PROCEDURES    EKG Interpretation  Interpreted by me, see lab section.    LABS  Results for orders placed or performed during the hospital encounter of 11/03/18   CBC w/ Differential   Result Value Ref Range    WBC 6.1 4.8 - 10.8 K/uL    RBC 5.38 4.20 - 5.40 M/uL    Hemoglobin 15.7 12.0 - 16.0 g/dL    Hematocrit 47.0 37.0 - 47.0 %    MCV 87.4 81.4 - 97.8 fL    MCH 29.2 27.0 - 33.0 pg    MCHC 33.4 (L) 33.6 - 35.0 g/dL    RDW 44.4 35.9 - 50.0 fL    Platelet Count 222 164 - 446 K/uL    MPV 10.1 9.0 - 12.9 fL    Neutrophils-Polys 46.70 44.00 - 72.00 %    Lymphocytes 39.60 22.00 - 41.00 %    Monocytes 9.20 0.00 - 13.40 %    Eosinophils 3.00 0.00 - 6.90 %    Basophils 0.80 0.00 - 1.80 %    Immature Granulocytes 0.70 0.00 - 0.90 %    Nucleated RBC 0.00 /100 WBC    Neutrophils (Absolute) 2.83 2.00 - 7.15 K/uL    " Lymphs (Absolute) 2.40 1.00 - 4.80 K/uL    Monos (Absolute) 0.56 0.00 - 0.85 K/uL    Eos (Absolute) 0.18 0.00 - 0.51 K/uL    Baso (Absolute) 0.05 0.00 - 0.12 K/uL    Immature Granulocytes (abs) 0.04 0.00 - 0.11 K/uL    NRBC (Absolute) 0.00 K/uL   Complete Metabolic Panel (CMP)   Result Value Ref Range    Sodium 135 135 - 145 mmol/L    Potassium 4.3 3.6 - 5.5 mmol/L    Chloride 105 96 - 112 mmol/L    Co2 8 (LL) 20 - 33 mmol/L    Anion Gap 22.0 (H) 0.0 - 11.9    Glucose 475 (H) 65 - 99 mg/dL    Bun 14 8 - 22 mg/dL    Creatinine 0.86 0.50 - 1.40 mg/dL    Calcium 9.1 8.5 - 10.5 mg/dL    AST(SGOT) 10 (L) 12 - 45 U/L    ALT(SGPT) 12 2 - 50 U/L    Alkaline Phosphatase 130 (H) 30 - 99 U/L    Total Bilirubin 0.5 0.1 - 1.5 mg/dL    Albumin 4.8 3.2 - 4.9 g/dL    Total Protein 7.4 6.0 - 8.2 g/dL    Globulin 2.6 1.9 - 3.5 g/dL    A-G Ratio 1.8 g/dL   MAGNESIUM   Result Value Ref Range    Magnesium 2.2 1.5 - 2.5 mg/dL   PHOSPHORUS   Result Value Ref Range    Phosphorus 3.1 2.5 - 4.5 mg/dL   BETA-HYDROXYBUTYRIC ACID   Result Value Ref Range    beta-Hydroxybutyric Acid >8.00 (H) 0.02 - 0.27 mmol/L   LIPASE   Result Value Ref Range    Lipase 11 11 - 82 U/L   ESTIMATED GFR   Result Value Ref Range    GFR If African American >60 >60 mL/min/1.73 m 2    GFR If Non African American >60 >60 mL/min/1.73 m 2   ACCU-CHEK GLUCOSE   Result Value Ref Range    Glucose - Accu-Ck 447 (HH) 65 - 99 mg/dL   EKG (NOW)   Result Value Ref Range    Report       [facility] Emergency Dept.    Test Date:  2018  Pt Name:    AMANDA BRANCH            Department: ER  MRN:        [id number]                      Room:       [facility]  Gender:     Female                       Technician: [id number]  :        1990                   Requested By:BRAD FITZGERALD  Order #:    [id number]                    Reading MD:    Measurements  Intervals                                Axis  Rate:       108                          P:          46  WY:          148                          QRS:        7  QRSD:       72                           T:          46  QT:         344  QTc:        461    Interpretive Statements  SINUS TACHYCARDIA  Compared to ECG 06/23/2018 16:23:12  No significant changes         All labs reviewed by me.    COURSE & MEDICAL DECISION MAKING  Nursing notes, VS, PMSFHx reviewed in chart.     Review of past medical records shows the patient Has a history of type 1 diabetes     9:18 AM - Patient seen and examined at bedside. Patient will be treated with NS infusion 2 liters, hydromorphone injection 1 mg, ondansetron injection 4 mg. Intravenous fluids were administered for dehydration and possible DKA. Ordered estimated GFR, CBC with differential, CMP, U/A culture if indicated, magnesium, phosphorus, beta-hydroxybutyric acid, lipase, venous blood gas, accu-check glucose, and EKG to evaluate her symptoms. The differential diagnoses include but are not limited to: UTI, DKA, viral syndrome, dehydration     Laboratory studies remarkable for an elevated glucose.  CO2 is low at 8.  Overall clinical presentation is consistent with diabetic ketoacidosis.    10:34 AM I ordered ondansetron injection 4 mg to treat.    10:39 AM I discussed the patient's case and the above findings with Dr. Street (Hospitalist) who agrees to admit and will transfer care of the patient at this time.  Dr. Street will initiate insulin drip as per standard procedure.    On repeat evaluation, is feeling better.  Hydration is improving.  Heart rate is improving but the patient requires admission to the hospital for further treatment.    CRITICAL CARE  I provided critical care services, which included medication orders, frequent reevaluations of the patient's condition and response to treatment, ordering and reviewing test results, and discussing the case with various consultants.  The critical care time associated with the care of the patient was 35 minutes. Review chart for  interventions. This time is exclusive of any other billable procedures.        DISPOSITION:  Patient will be admitted to Dr. Street (Hospitalist) in critical condition.    FINAL IMPRESSION  1. Diabetic ketoacidosis without coma associated with type 1 diabetes mellitus (HCC)    2. Dehydration       5.      Total critical care time of 35 minutes, separate of any billable procedures     IThom (Scribe), am scribing for, and in the presence of, Jeb Torres M.D..    Electronically signed by: Thom Burkett (Scribe), 11/3/2018    IJeb M.D. personally performed the services described in this documentation, as scribed by Thom Burkett in my presence, and it is both accurate and complete. C    The note accurately reflects work and decisions made by me.  Jeb oTrres  11/3/2018  11:12 AM

## 2018-11-03 NOTE — ED NOTES
Chief Complaint   Patient presents with   • High Blood Sugar   • N/V     Agree with triage RN. PIV established, using US. Blood drawn and sent to lab. Pt placed on monitor. Chart up for ERP. Pt reports taking long acting insulin this morning.

## 2018-11-03 NOTE — CONSULTS
PULMONARY AND CRITICAL CARE MEDICINE CONSULTATION    Date of Consultation:  11/3/2018    Requesting Physician:  El Us MD    Consulting Physician:  Michael Gayle MD    Reason for Consultation: Critical care management in lady with acute DKA    Chief Complaint: Nausea, vomiting and abdominal pain    History of Present Illness:    I was kindly asked to see and evaluate Alis Sparks, a 28 y.o. female for evaluation and management of the above problem.    This lady has a history of diabetes mellitus type 1 and gastroparesis.  She has been previously admitted with DKA.  She is not been feeling well for the last several days.  She has a cough which is dry and nonproductive as well as myalgias and arthralgias and headache.  This morning her blood glucose was high and she developed nausea and vomiting with abdominal pain which she describes as a sharp pain in her right side.  She has had some dysuria, but no hematuria.  She just stopped her menstrual period.  She denies any sick contacts.    Medications Prior to Admission:    No current facility-administered medications on file prior to encounter.      Current Outpatient Prescriptions on File Prior to Encounter   Medication Sig Dispense Refill   • Insulin Glargine (BASAGLAR KWIKPEN) 100 UNIT/ML Solution Pen-injector Inject 30 Units as instructed 2 Times a Day.     • insulin glulisine (APIDRA) 100 UNIT/ML Solution Pen-injector injection Inject 2-10 Units as instructed 3 times a day, with meals. FSBS base 150  For every 50 increase in blood sugar insulin doubles  200 = 2 units  250 = 4 units  300 = 8 units  350 =  16 units  400 = 32 units     • gabapentin (NEURONTIN) 100 MG Cap Take 1 Cap by mouth 2 Times a Day. 60 Cap 1   • omeprazole (PRILOSEC) 20 MG delayed-release capsule Take 1 Cap by mouth every day. 30 Cap 1   • metoclopramide (REGLAN) 10 MG/10ML Solution Take 10 mg by mouth 3 times a day before meals.     • ferrous sulfate 325 (65 Fe) MG  tablet Take 325 mg by mouth every day.     • Cholecalciferol 2000 UNIT Cap Take 1 Cap by mouth every day.     • atorvastatin (LIPITOR) 20 MG Tab Take 1 Tab by mouth every day. 30 Tab 0       Current Medications:      Current Facility-Administered Medications:   •  atorvastatin (LIPITOR) tablet 20 mg, 20 mg, Oral, QHS, El Us M.D.  •  vitamin D (cholecalciferol) tablet 2,000 Units, 2,000 Units, Oral, DAILY, El Us M.D.  •  [START ON 11/4/2018] ferrous sulfate tablet 325 mg, 325 mg, Oral, DAILY, El Us M.D.  •  gabapentin (NEURONTIN) capsule 100 mg, 100 mg, Oral, BID, El Us M.D.  •  metoclopramide (REGLAN) 5 MG/5ML solution 10 mg, 10 mg, Oral, TID AC, El Us M.D.  •  omeprazole (PRILOSEC) capsule 20 mg, 20 mg, Oral, DAILY, El Us M.D.  •  dextrose 10% and 0.45% NaCl infusion, , Intravenous, Continuous, El Us M.D., Stopped at 11/03/18 1130  •  heparin injection 5,000 Units, 5,000 Units, Subcutaneous, Q8HRS, El Us M.D.  •  acetaminophen (TYLENOL) tablet 650 mg, 650 mg, Oral, Q6HRS PRN, El Us M.D.  •  labetalol (NORMODYNE,TRANDATE) injection 10 mg, 10 mg, Intravenous, Q4HRS PRN, El Us M.D.  •  MD ALERT-PHARMACY TO CONSULT FOR DKA MONITORING 1 Each, 1 Each, Other, PRN, El Us M.D.  •  Adult DKA potassium(K+) replacement scale, 1 Each, Intravenous, Q4HRS, El Us M.D.  •  ondansetron (ZOFRAN) syringe/vial injection 4 mg, 4 mg, Intravenous, Q4HRS PRN, El Us M.D.  •  ondansetron (ZOFRAN ODT) dispertab 4 mg, 4 mg, Oral, Q4HRS PRN, El Us M.D.  •  promethazine (PHENERGAN) tablet 12.5-25 mg, 12.5-25 mg, Oral, Q4HRS PRN, El Us M.D.  •  promethazine (PHENERGAN) suppository 12.5-25 mg, 12.5-25 mg, Rectal, Q4HRS PRN, El Us M.D.  •  prochlorperazine (COMPAZINE) injection 5-10 mg, 5-10 mg, Intravenous, Q4HRS PRN, El Us M.D., 10 mg at 11/03/18 1152  •  senna-docusate (PERICOLACE or SENOKOT S)  8.6-50 MG per tablet 2 Tab, 2 Tab, Oral, BID **AND** polyethylene glycol/lytes (MIRALAX) PACKET 1 Packet, 1 Packet, Oral, QDAY PRN **AND** magnesium hydroxide (MILK OF MAGNESIA) suspension 30 mL, 30 mL, Oral, QDAY PRN **AND** bisacodyl (DULCOLAX) suppository 10 mg, 10 mg, Rectal, QDAY PRN, El Us M.D.  •  magnesium sulfate IVPB premix 2 g, 2 g, Intravenous, Once PRN, Stopped at 11/03/18 1200 **OR** magnesium sulfate IVPB premix 4 g, 4 g, Intravenous, Once PRN, El Us M.D.  •  potassium phosphates 30 mmol in  mL ivpb, 30 mmol, Intravenous, Once PRN, Stopped at 11/03/18 1200 **OR** [DISCONTINUED] sodium phosphate 30 mmol in 1/2  mL ivpb, 30 mmol, Intravenous, Once PRN, El Us M.D.  •  NS infusion, , Intravenous, Continuous, El Us M.D., Stopped at 11/03/18 1130  •  D5 NS infusion, , Intravenous, Continuous, El Us M.D., Stopped at 11/03/18 1130  •  insulin regular human (HUMULIN/NOVOLIN R) 62.5 Units in  mL Infusion for DKA, 4 Units/hr, Intravenous, Continuous, El Us M.D., Last Rate: 16 mL/hr at 11/03/18 1206, 4 Units/hr at 11/03/18 1206  •  HYDROmorphone pf (DILAUDID) injection 0.5-2 mg, 0.5-2 mg, Intravenous, Q4HRS PRN, Michael Gayle M.D.    Allergies:    Pcn [penicillins]; Lisinopril; and Promethazine    Past Surgical History:    Past Surgical History:   Procedure Laterality Date   • GASTROSCOPY-ENDO N/A 6/9/2018    Procedure: GASTROSCOPY-ENDO WITH BIOPSIES AND DIALATION;  Surgeon: Marino Black M.D.;  Location: SURGERY [facility];  Service: Gastroenterology   • SUBMANDIBLE ABSCESS INCISION AND DRAINAGE Left 11/8/2017    Procedure: SUBMANDIBLE ABSCESS INCISION AND DRAINAGE;  Surgeon: Osman Young M.D.;  Location: [facility];  Service: Dental   • DENTAL EXTRACTION(S)  11/8/2017    Procedure: DENTAL EXTRACTION(S);  Surgeon: Osman Young M.D.;  Location: [facility];  Service: Dental   • GASTROSCOPY-ENDO   9/18/2014    Performed by Sylvain PERRY M.D. at ENDOSCOPY [facility]   • GASTROSCOPY WITH BIOPSY  9/18/2014    Performed by Sylvain PERRY M.D. at ENDOSCOPY [facility]   • OTHER      surgery to patch lung after pneumor from central line placement       Past Medical History:    Past Medical History:   Diagnosis Date   • Backpain    • Bronchitis    • Diabetes type 1, controlled ([facility])     tests 4-5 times daily   • DKA (diabetic ketoacidoses) ([facility])    • Fall    • Gastroparesis    • Kidney infection    • Pneumonia    • UTI (urinary tract infection)        Social History:    Social History     Social History   • Marital status: Single     Spouse name: N/A   • Number of children: N/A   • Years of education: N/A     Occupational History   • Not on file.     Social History Main Topics   • Smoking status: Never Smoker   • Smokeless tobacco: Never Used   • Alcohol use No   • Drug use: No   • Sexual activity: No     Other Topics Concern   • Not on file     Social History Narrative   • No narrative on file       Family History:    Family History   Problem Relation Age of Onset   • Cancer Unknown         breasts, multiple family members   • Hypertension Maternal Grandfather        Review of System:    Review of Systems   Constitutional: Positive for malaise/fatigue. Negative for diaphoresis.   HENT: Positive for sore throat. Negative for ear pain, nosebleeds, sinus pain and tinnitus.    Eyes: Negative for blurred vision, double vision and photophobia.   Respiratory: Positive for cough. Negative for hemoptysis, sputum production, shortness of breath and stridor.    Cardiovascular: Negative for chest pain, palpitations, orthopnea and leg swelling.   Gastrointestinal: Positive for abdominal pain, nausea and vomiting. Negative for blood in stool.   Genitourinary: Positive for dysuria and hematuria. Negative for frequency and urgency.   Musculoskeletal: Negative for joint pain, myalgias and neck pain.   Skin: Negative for  "rash.   Neurological: Positive for weakness and headaches. Negative for speech change, focal weakness, seizures and loss of consciousness.   Endo/Heme/Allergies: Does not bruise/bleed easily.   Psychiatric/Behavioral: Negative for depression, hallucinations and substance abuse. The patient is not nervous/anxious.        Physical Examination:    /87   Pulse (!) 122   Temp 36.8 °C (98.3 °F)   Resp (!) 28   Ht 1.651 m (5' 5\")   Wt 82.1 kg (181 lb)   LMP 10/30/2018 (Approximate)   SpO2 100%   BMI 30.12 kg/m²   Physical Exam   Constitutional: She is oriented to person, place, and time.   HENT:   Head: Normocephalic and atraumatic.   Right Ear: External ear normal.   Left Ear: External ear normal.   Nose: Nose normal.   Dry mucous membranes   Eyes: Pupils are equal, round, and reactive to light. Conjunctivae are normal. Right eye exhibits no discharge. Left eye exhibits no discharge. No scleral icterus.   Neck: Normal range of motion. Neck supple. No JVD present. No tracheal deviation present.   Cardiovascular: Intact distal pulses.  Exam reveals no gallop and no friction rub.    Sinus tachycardia   Pulmonary/Chest: Effort normal. No stridor. No respiratory distress. She has no wheezes. She has no rales.   Abdominal: Soft. Bowel sounds are normal. She exhibits no distension. There is tenderness (Mild tenderness along the right side of her lateral abdomen). There is no rebound.   Musculoskeletal: She exhibits no tenderness or deformity.   No clubbing or cyanosis   Neurological: She is alert and oriented to person, place, and time. No cranial nerve deficit. Coordination normal. GCS score is 15.   No focal weakness   Skin: Skin is warm and dry. No rash noted. She is not diaphoretic.   Psychiatric: Memory and affect normal.       Laboratory Data:    Recent Labs      11/03/18   1054   HNUWT26U  7.08*   [id number]  17.9*   [id number]  62.1*   [id number]  88.4*   ARTHCO3  5*   ARTBE  -23*     Recent Labs      " 11/03/18   0939   WBC  6.1   RBC  5.38   HEMOGLOBIN  15.7   HEMATOCRIT  47.0   MCV  87.4   MCH  29.2   MCHC  33.4*   RDW  44.4   PLATELETCT  222   MPV  10.1     Recent Labs      11/03/18   0939   SODIUM  135   POTASSIUM  4.3   CHLORIDE  105   CO2  8*   GLUCOSE  475*   BUN  14   CREATININE  0.86   CALCIUM  9.1                   Imaging:    I personally viewed the CXR and CT scan images as well as reviewed the radiology interpretation reports.    No orders to display       Assessment and Plan:    Diabetic ketoacidosis (HCC)- (present on admission)   Assessment & Plan    Admit to ICU  Volume resuscitate with IV fluids  Titrating insulin drip  Monitor glucose hourly  Monitor serum electrolytes and acid-base status every 4 hours  Replete potassium, phosphorus and magnesium based upon electrolyte determination        Type 1 diabetes mellitus (HCC)- (present on admission)   Assessment & Plan    Now in DKA        Gastroparesis due to DM (HCC)- (present on admission)   Assessment & Plan    Continue Reglan, 10 mg before meals  Continue home PPI          This lady is critically ill in the ICU with acute DKA.  I have assessed and reassessed her blood pressure, hemodynamics, cardiovascular status, blood glucose and acid-base status with titration of an insulin drip, serum electrolytes.  She is at increased risk for worsening cardiovascular, metabolic and endocrine system dysfunction.    High risk of deterioration and worsening vital organ dysfunction and death without the above critical care interventions.    Thank you for allowing me to participate in the care of this lady.  I will continue to follow her with great interest.    Critical Care Time: 35 minutes  19098  No time overlap  Time excludes procedures  Discussed with RN, clinical pharmacist, hospitalist    Michael Gayle MD  Pulmonary and Critical Care Medicine

## 2018-11-03 NOTE — H&P
Hospital Medicine History & Physical Note    Date of Service  11/3/2018    Primary Care Physician  Navi Huber M.D.    Consultants  Critical care    Code Status  Full code    Chief Complaint  Nausea and vomiting    History of Presenting Illness  28 y.o. female with past medical history of type 1 diabetes mellitus, frequent admissions for DKA, gastroparesis who presented 11/3/2018 with nausea and vomiting since this morning.  Patient states during this week she has been feeling unwell as if she has the flu.  She denies any sick contacts but endorses having a headache and dry cough along with some muscle aches and joint pains.  She states when she woke up this morning she was nauseous, vomiting, and right lower quadrant abdominal pain.  She also endorses having some dysuria.  Upon arrival to emergency room she was noted to be in DKA so started on appropriate protocol and admitted to ICU.    Review of Systems  Review of Systems   Constitutional: Negative for chills and fever.   HENT: Negative for congestion, hearing loss and sore throat.    Eyes: Negative for blurred vision and double vision.   Respiratory: Positive for cough. Negative for sputum production and shortness of breath.    Cardiovascular: Negative for chest pain and palpitations.   Gastrointestinal: Positive for abdominal pain, nausea and vomiting. Negative for heartburn.   Genitourinary: Positive for dysuria. Negative for hematuria and urgency.   Musculoskeletal: Positive for joint pain and myalgias. Negative for back pain and falls.   Skin: Negative for itching and rash.   Neurological: Positive for headaches. Negative for dizziness and loss of consciousness.   All other systems reviewed and are negative.      Past Medical History   has a past medical history of Backpain; Bronchitis; Diabetes type 1, controlled ([facility]); DKA (diabetic ketoacidoses) ([facility]); Fall; Gastroparesis; Kidney infection; Pneumonia; and UTI (urinary tract  infection).    Surgical History   has a past surgical history that includes gastroscopy-endo (9/18/2014); gastroscopy with biopsy (9/18/2014); other; submandible abscess incision and drainage (Left, 11/8/2017); dental extraction(s) (11/8/2017); and gastroscopy-endo (N/A, 6/9/2018).     Family History  family history includes Cancer in her unknown relative; Hypertension in her maternal grandfather.     Social History   reports that she has never smoked. She has never used smokeless tobacco. She reports that she does not drink alcohol or use drugs.    Allergies  Allergies   Allergen Reactions   • Pcn [Penicillins] Shortness of Breath and Swelling     Per patient's Mom, patient tolerates Keflex   • Lisinopril Unspecified     Per historical: Reported pedal swelling. No facial/angioedema or rash nor respiratory symptoms.    • Promethazine Vomiting       Medications  Prior to Admission Medications   Prescriptions Last Dose Informant Patient Reported? Taking?   Cholecalciferol 2000 UNIT Cap 7/2/2018 at 1000 Patient Yes No   Sig: Take 1 Cap by mouth every day.   Insulin Glargine (BASAGLAR KWIKPEN) 100 UNIT/ML Solution Pen-injector 7/3/2018 at 0900 Patient Yes No   Sig: Inject 33 Units as instructed 2 Times a Day.   OLANZapine (ZYPREXA) 5 MG Tab 7/2/2018 at 1800 Patient No No   Sig: Take 1 Tab by mouth every evening.   atorvastatin (LIPITOR) 20 MG Tab 7/2/2018 at 1000 Patient No No   Sig: Take 1 Tab by mouth every day.   ferrous sulfate 325 (65 Fe) MG tablet 7/2/2018 at 1000 Patient Yes No   Sig: Take 325 mg by mouth every day.   gabapentin (NEURONTIN) 100 MG Cap 7/2/2018 at 1830 Patient No No   Sig: Take 1 Cap by mouth 2 Times a Day.   insulin glulisine (APIDRA) 100 UNIT/ML Solution Pen-injector injection 7/3/2018 at 0900 Patient Yes No   Sig: Inject 2-10 Units as instructed 3 times a day, with meals. FSBS base 150  For every 50 increase in blood sugar insulin doubles  200 = 2 units  250 = 4 units  300 = 8 units  350 =  16  units  400 = 32 units   metoclopramide (REGLAN) 10 MG/10ML Solution 7/2/2018 at 1800 Patient Yes No   Sig: Take 10 mg by mouth 3 times a day before meals.   omeprazole (PRILOSEC) 20 MG delayed-release capsule 7/2/2018 at 0900 Patient No No   Sig: Take 1 Cap by mouth every day.   senna-docusate (PERICOLACE OR SENOKOT S) 8.6-50 MG Tab 7/2/2018 at 1800 Patient No No   Sig: Take 2 Tabs by mouth 2 Times a Day.   sucralfate (CARAFATE) 1 GM/10ML Suspension 7/2/2018 at 2100 Patient No No   Sig: Take 10 mL by mouth every 6 hours.      Facility-Administered Medications: None       Physical Exam  Temp:  [36.6 °C (97.8 °F)-36.8 °C (98.3 °F)] 36.6 °C (97.8 °F)  Pulse:  [110-135] 129  Resp:  [18-32] 19  BP: (126)/(87) 126/87    Physical Exam   Constitutional: She is oriented to person, place, and time. She appears well-developed and well-nourished.   Patient is very sleepy   HENT:   Head: Normocephalic and atraumatic.   Eyes: Pupils are equal, round, and reactive to light. EOM are normal.   Neck: Normal range of motion. Neck supple.   Cardiovascular: Normal rate and regular rhythm.    Pulmonary/Chest: Effort normal and breath sounds normal. No respiratory distress.   Abdominal: Soft. Bowel sounds are normal. She exhibits no distension. There is no tenderness.   Musculoskeletal: Normal range of motion. She exhibits no edema.   Neurological: She is alert and oriented to person, place, and time.   Skin: Skin is warm and dry.   Psychiatric: She has a normal mood and affect.   Nursing note and vitals reviewed.      Laboratory:  Recent Labs      11/03/18   0939   WBC  6.1   RBC  5.38   HEMOGLOBIN  15.7   HEMATOCRIT  47.0   MCV  87.4   MCH  29.2   MCHC  33.4*   RDW  44.4   PLATELETCT  222   MPV  10.1     Recent Labs      11/03/18   0939  11/03/18   1200   SODIUM  135  139   POTASSIUM  4.3  4.2   CHLORIDE  105  114*   CO2  8*  6*   GLUCOSE  475*  393*   BUN  14  14   CREATININE  0.86  0.69   CALCIUM  9.1  7.8*     Recent Labs       11/03/18   0939  11/03/18   1200   ALTSGPT  12   --    ASTSGOT  10*   --    ALKPHOSPHAT  130*   --    TBILIRUBIN  0.5   --    LIPASE  11   --    GLUCOSE  475*  393*                 No results for input(s): TROPONINI in the last 72 hours.    Urinalysis:    No results found      Imaging:  No orders to display         Assessment/Plan:  I anticipate this patient will require at least two midnights for appropriate medical management, necessitating inpatient admission.    Diabetic ketoacidosis (HCC)- (present on admission)   Assessment & Plan    Patient has been started on IV fluids and IV insulin per DKA protocol   Every hour Accu-Cheks with titration of insulin drip   Monitor BMP every 4 hours   Switch to subcutaneous insulin once anion gap is closed   Replete electrolytes  No clear infectious etiology of triggering factor.  Urinalysis and chest x-ray with no signs of infection.  Follow-up on rapid flu            SIRS (systemic inflammatory response syndrome) ([facility])   Assessment & Plan    Patient tachycardic and tachypneic secondary to DKA  This is not sepsis  Patient not requiring antibiotics        High anion gap metabolic acidosis   Assessment & Plan    Due to DKA, beta hydroxybutyrate greater than 8  PH 7.08  Continue DKA protocol        Hematuria   Assessment & Plan    Noted on urinalysis  Upon discussion with patient and mother it appears patient is currently on her menstrual period        Gastroparesis due to DM (HCC)- (present on admission)   Assessment & Plan    Continue Reglan            VTE prophylaxis: Heparin

## 2018-11-03 NOTE — ASSESSMENT & PLAN NOTE
Noted on urinalysis  Upon discussion with patient and mother it appears patient is currently on her menstrual period

## 2018-11-03 NOTE — PROGRESS NOTES
Request for inpatient admit to ICU by ERP for DKA  Discussed with Dr Abeba Us to evaluate patient, do H&P and do admitting orders    Chris Street M.D.  11/03/18  10:43 AM

## 2018-11-03 NOTE — ED TRIAGE NOTES
Pt to triage by w/c with mother c/o high blood sugar & N/V since this morning.  Pt reporting blood sugars in 400's.  Actively vomiting in triage.  FSBS 447 in triage.

## 2018-11-03 NOTE — ASSESSMENT & PLAN NOTE
Patient tachycardic and tachypneic secondary to DKA  This is not sepsis  Patient not requiring antibiotics

## 2018-11-03 NOTE — ASSESSMENT & PLAN NOTE
Improving, possibly precipitated by a viral syndrome  Discontinue insulin drip  Transition to long and short acting insulin  Discontinue IV fluids once tolerating adequate p.o.  Diabetic diet  Diabetic education

## 2018-11-04 LAB
ANION GAP SERPL CALC-SCNC: 2 MMOL/L (ref 0–11.9)
ANION GAP SERPL CALC-SCNC: 5 MMOL/L (ref 0–11.9)
ANION GAP SERPL CALC-SCNC: 6 MMOL/L (ref 0–11.9)
ANION GAP SERPL CALC-SCNC: 8 MMOL/L (ref 0–11.9)
BUN SERPL-MCNC: 11 MG/DL (ref 8–22)
BUN SERPL-MCNC: 12 MG/DL (ref 8–22)
BUN SERPL-MCNC: 12 MG/DL (ref 8–22)
BUN SERPL-MCNC: 13 MG/DL (ref 8–22)
BUN SERPL-MCNC: 6 MG/DL (ref 8–22)
BUN SERPL-MCNC: 9 MG/DL (ref 8–22)
BUN SERPL-MCNC: 9 MG/DL (ref 8–22)
CALCIUM SERPL-MCNC: 7.2 MG/DL (ref 8.5–10.5)
CALCIUM SERPL-MCNC: 7.5 MG/DL (ref 8.5–10.5)
CALCIUM SERPL-MCNC: 8 MG/DL (ref 8.5–10.5)
CALCIUM SERPL-MCNC: 8.1 MG/DL (ref 8.5–10.5)
CALCIUM SERPL-MCNC: 8.1 MG/DL (ref 8.5–10.5)
CALCIUM SERPL-MCNC: 8.2 MG/DL (ref 8.5–10.5)
CALCIUM SERPL-MCNC: 8.4 MG/DL (ref 8.5–10.5)
CHLORIDE SERPL-SCNC: 114 MMOL/L (ref 96–112)
CHLORIDE SERPL-SCNC: 115 MMOL/L (ref 96–112)
CHLORIDE SERPL-SCNC: 116 MMOL/L (ref 96–112)
CHLORIDE SERPL-SCNC: 117 MMOL/L (ref 96–112)
CHLORIDE SERPL-SCNC: 120 MMOL/L (ref 96–112)
CHLORIDE SERPL-SCNC: 120 MMOL/L (ref 96–112)
CHLORIDE SERPL-SCNC: 123 MMOL/L (ref 96–112)
CO2 SERPL-SCNC: 13 MMOL/L (ref 20–33)
CO2 SERPL-SCNC: 15 MMOL/L (ref 20–33)
CO2 SERPL-SCNC: 15 MMOL/L (ref 20–33)
CO2 SERPL-SCNC: 16 MMOL/L (ref 20–33)
CO2 SERPL-SCNC: 17 MMOL/L (ref 20–33)
CO2 SERPL-SCNC: 18 MMOL/L (ref 20–33)
CO2 SERPL-SCNC: 18 MMOL/L (ref 20–33)
CREAT SERPL-MCNC: 0.39 MG/DL (ref 0.5–1.4)
CREAT SERPL-MCNC: 0.42 MG/DL (ref 0.5–1.4)
CREAT SERPL-MCNC: 0.48 MG/DL (ref 0.5–1.4)
CREAT SERPL-MCNC: 0.49 MG/DL (ref 0.5–1.4)
CREAT SERPL-MCNC: 0.49 MG/DL (ref 0.5–1.4)
CREAT SERPL-MCNC: 0.54 MG/DL (ref 0.5–1.4)
CREAT SERPL-MCNC: 0.56 MG/DL (ref 0.5–1.4)
GLUCOSE BLD-MCNC: 106 MG/DL (ref 65–99)
GLUCOSE BLD-MCNC: 113 MG/DL (ref 65–99)
GLUCOSE BLD-MCNC: 115 MG/DL (ref 65–99)
GLUCOSE BLD-MCNC: 115 MG/DL (ref 65–99)
GLUCOSE BLD-MCNC: 134 MG/DL (ref 65–99)
GLUCOSE BLD-MCNC: 148 MG/DL (ref 65–99)
GLUCOSE BLD-MCNC: 152 MG/DL (ref 65–99)
GLUCOSE BLD-MCNC: 158 MG/DL (ref 65–99)
GLUCOSE BLD-MCNC: 164 MG/DL (ref 65–99)
GLUCOSE BLD-MCNC: 166 MG/DL (ref 65–99)
GLUCOSE BLD-MCNC: 169 MG/DL (ref 65–99)
GLUCOSE BLD-MCNC: 169 MG/DL (ref 65–99)
GLUCOSE BLD-MCNC: 172 MG/DL (ref 65–99)
GLUCOSE BLD-MCNC: 173 MG/DL (ref 65–99)
GLUCOSE BLD-MCNC: 174 MG/DL (ref 65–99)
GLUCOSE BLD-MCNC: 178 MG/DL (ref 65–99)
GLUCOSE BLD-MCNC: 180 MG/DL (ref 65–99)
GLUCOSE BLD-MCNC: 184 MG/DL (ref 65–99)
GLUCOSE BLD-MCNC: 82 MG/DL (ref 65–99)
GLUCOSE BLD-MCNC: 86 MG/DL (ref 65–99)
GLUCOSE BLD-MCNC: 95 MG/DL (ref 65–99)
GLUCOSE SERPL-MCNC: 107 MG/DL (ref 65–99)
GLUCOSE SERPL-MCNC: 133 MG/DL (ref 65–99)
GLUCOSE SERPL-MCNC: 166 MG/DL (ref 65–99)
GLUCOSE SERPL-MCNC: 179 MG/DL (ref 65–99)
GLUCOSE SERPL-MCNC: 193 MG/DL (ref 65–99)
GLUCOSE SERPL-MCNC: 201 MG/DL (ref 65–99)
GLUCOSE SERPL-MCNC: 226 MG/DL (ref 65–99)
MAGNESIUM SERPL-MCNC: 1.9 MG/DL (ref 1.5–2.5)
MAGNESIUM SERPL-MCNC: 2.1 MG/DL (ref 1.5–2.5)
PHOSPHATE SERPL-MCNC: 2.2 MG/DL (ref 2.5–4.5)
PHOSPHATE SERPL-MCNC: 2.6 MG/DL (ref 2.5–4.5)
POTASSIUM SERPL-SCNC: 3.2 MMOL/L (ref 3.6–5.5)
POTASSIUM SERPL-SCNC: 3.3 MMOL/L (ref 3.6–5.5)
POTASSIUM SERPL-SCNC: 3.4 MMOL/L (ref 3.6–5.5)
POTASSIUM SERPL-SCNC: 3.6 MMOL/L (ref 3.6–5.5)
POTASSIUM SERPL-SCNC: 3.7 MMOL/L (ref 3.6–5.5)
POTASSIUM SERPL-SCNC: 3.8 MMOL/L (ref 3.6–5.5)
POTASSIUM SERPL-SCNC: 7 MMOL/L (ref 3.6–5.5)
SODIUM SERPL-SCNC: 133 MMOL/L (ref 135–145)
SODIUM SERPL-SCNC: 138 MMOL/L (ref 135–145)
SODIUM SERPL-SCNC: 138 MMOL/L (ref 135–145)
SODIUM SERPL-SCNC: 139 MMOL/L (ref 135–145)
SODIUM SERPL-SCNC: 140 MMOL/L (ref 135–145)
SODIUM SERPL-SCNC: 141 MMOL/L (ref 135–145)
SODIUM SERPL-SCNC: 144 MMOL/L (ref 135–145)

## 2018-11-04 PROCEDURE — 84100 ASSAY OF PHOSPHORUS: CPT

## 2018-11-04 PROCEDURE — 700105 HCHG RX REV CODE 258: Performed by: HOSPITALIST

## 2018-11-04 PROCEDURE — 83735 ASSAY OF MAGNESIUM: CPT

## 2018-11-04 PROCEDURE — 770022 HCHG ROOM/CARE - ICU (200)

## 2018-11-04 PROCEDURE — 700111 HCHG RX REV CODE 636 W/ 250 OVERRIDE (IP): Performed by: INTERNAL MEDICINE

## 2018-11-04 PROCEDURE — 99291 CRITICAL CARE FIRST HOUR: CPT | Performed by: INTERNAL MEDICINE

## 2018-11-04 PROCEDURE — 80048 BASIC METABOLIC PNL TOTAL CA: CPT

## 2018-11-04 PROCEDURE — 700105 HCHG RX REV CODE 258: Performed by: INTERNAL MEDICINE

## 2018-11-04 PROCEDURE — 82962 GLUCOSE BLOOD TEST: CPT | Mod: 91

## 2018-11-04 PROCEDURE — 700111 HCHG RX REV CODE 636 W/ 250 OVERRIDE (IP): Performed by: HOSPITALIST

## 2018-11-04 PROCEDURE — A9270 NON-COVERED ITEM OR SERVICE: HCPCS | Performed by: HOSPITALIST

## 2018-11-04 PROCEDURE — 700102 HCHG RX REV CODE 250 W/ 637 OVERRIDE(OP): Performed by: HOSPITALIST

## 2018-11-04 PROCEDURE — 99233 SBSQ HOSP IP/OBS HIGH 50: CPT | Performed by: HOSPITALIST

## 2018-11-04 RX ORDER — DEXTROSE MONOHYDRATE 100 MG/ML
INJECTION, SOLUTION INTRAVENOUS CONTINUOUS
Status: DISCONTINUED | OUTPATIENT
Start: 2018-11-04 | End: 2018-11-05

## 2018-11-04 RX ORDER — POTASSIUM CHLORIDE 7.45 MG/ML
INJECTION INTRAVENOUS
Status: ACTIVE
Start: 2018-11-04 | End: 2018-11-04

## 2018-11-04 RX ORDER — POTASSIUM CHLORIDE 7.45 MG/ML
10 INJECTION INTRAVENOUS
Status: COMPLETED | OUTPATIENT
Start: 2018-11-04 | End: 2018-11-04

## 2018-11-04 RX ADMIN — DEXTROSE AND SODIUM CHLORIDE: 10; .45 INJECTION, SOLUTION INTRAVENOUS at 03:09

## 2018-11-04 RX ADMIN — GABAPENTIN 100 MG: 100 CAPSULE ORAL at 05:10

## 2018-11-04 RX ADMIN — GABAPENTIN 100 MG: 100 CAPSULE ORAL at 17:08

## 2018-11-04 RX ADMIN — HYDROMORPHONE HYDROCHLORIDE 1.5 MG: 1 INJECTION, SOLUTION INTRAMUSCULAR; INTRAVENOUS; SUBCUTANEOUS at 22:18

## 2018-11-04 RX ADMIN — DEXTROSE MONOHYDRATE: 100 INJECTION, SOLUTION INTRAVENOUS at 18:55

## 2018-11-04 RX ADMIN — VITAMIN D, TAB 1000IU (100/BT) 2000 UNITS: 25 TAB at 05:10

## 2018-11-04 RX ADMIN — METOCLOPRAMIDE HYDROCHLORIDE 10 MG: 5 SOLUTION ORAL at 11:12

## 2018-11-04 RX ADMIN — ATORVASTATIN CALCIUM 20 MG: 20 TABLET, FILM COATED ORAL at 20:19

## 2018-11-04 RX ADMIN — POTASSIUM CHLORIDE 10 MEQ: 7.46 INJECTION, SOLUTION INTRAVENOUS at 08:06

## 2018-11-04 RX ADMIN — HEPARIN SODIUM 5000 UNITS: 5000 INJECTION, SOLUTION INTRAVENOUS; SUBCUTANEOUS at 05:11

## 2018-11-04 RX ADMIN — HYDROMORPHONE HYDROCHLORIDE 2 MG: 1 INJECTION, SOLUTION INTRAMUSCULAR; INTRAVENOUS; SUBCUTANEOUS at 03:23

## 2018-11-04 RX ADMIN — POTASSIUM CHLORIDE 10 MEQ: 7.46 INJECTION, SOLUTION INTRAVENOUS at 21:20

## 2018-11-04 RX ADMIN — POTASSIUM CHLORIDE 10 MEQ: 7.46 INJECTION, SOLUTION INTRAVENOUS at 06:21

## 2018-11-04 RX ADMIN — METOCLOPRAMIDE HYDROCHLORIDE 10 MG: 5 SOLUTION ORAL at 17:07

## 2018-11-04 RX ADMIN — PROCHLORPERAZINE EDISYLATE 10 MG: 5 INJECTION INTRAMUSCULAR; INTRAVENOUS at 22:19

## 2018-11-04 RX ADMIN — METOCLOPRAMIDE HYDROCHLORIDE 10 MG: 5 SOLUTION ORAL at 05:10

## 2018-11-04 RX ADMIN — POTASSIUM CHLORIDE 10 MEQ: 7.46 INJECTION, SOLUTION INTRAVENOUS at 03:39

## 2018-11-04 RX ADMIN — OMEPRAZOLE 20 MG: 20 CAPSULE, DELAYED RELEASE ORAL at 05:10

## 2018-11-04 RX ADMIN — POTASSIUM CHLORIDE 10 MEQ: 7.46 INJECTION, SOLUTION INTRAVENOUS at 03:35

## 2018-11-04 RX ADMIN — HYDROMORPHONE HYDROCHLORIDE 2 MG: 1 INJECTION, SOLUTION INTRAMUSCULAR; INTRAVENOUS; SUBCUTANEOUS at 08:10

## 2018-11-04 RX ADMIN — PROCHLORPERAZINE EDISYLATE 10 MG: 5 INJECTION INTRAMUSCULAR; INTRAVENOUS at 14:44

## 2018-11-04 RX ADMIN — POTASSIUM CHLORIDE 10 MEQ: 7.46 INJECTION, SOLUTION INTRAVENOUS at 12:56

## 2018-11-04 RX ADMIN — DEXTROSE AND SODIUM CHLORIDE: 10; .45 INJECTION, SOLUTION INTRAVENOUS at 08:28

## 2018-11-04 RX ADMIN — POTASSIUM CHLORIDE 10 MEQ: 7.46 INJECTION, SOLUTION INTRAVENOUS at 17:54

## 2018-11-04 RX ADMIN — HYDROMORPHONE HYDROCHLORIDE 1 MG: 1 INJECTION, SOLUTION INTRAMUSCULAR; INTRAVENOUS; SUBCUTANEOUS at 14:44

## 2018-11-04 RX ADMIN — POTASSIUM CHLORIDE 10 MEQ: 7.46 INJECTION, SOLUTION INTRAVENOUS at 09:32

## 2018-11-04 RX ADMIN — FERROUS SULFATE TAB 325 MG (65 MG ELEMENTAL FE) 325 MG: 325 (65 FE) TAB at 05:10

## 2018-11-04 RX ADMIN — POTASSIUM CHLORIDE 10 MEQ: 7.46 INJECTION, SOLUTION INTRAVENOUS at 14:28

## 2018-11-04 RX ADMIN — SODIUM CHLORIDE 3 UNITS/HR: 9 INJECTION, SOLUTION INTRAVENOUS at 15:00

## 2018-11-04 RX ADMIN — POTASSIUM CHLORIDE 10 MEQ: 7.46 INJECTION, SOLUTION INTRAVENOUS at 18:57

## 2018-11-04 RX ADMIN — DEXTROSE MONOHYDRATE: 100 INJECTION, SOLUTION INTRAVENOUS at 13:35

## 2018-11-04 RX ADMIN — PROCHLORPERAZINE EDISYLATE 10 MG: 5 INJECTION INTRAMUSCULAR; INTRAVENOUS at 08:10

## 2018-11-04 RX ADMIN — POTASSIUM CHLORIDE 10 MEQ: 7.46 INJECTION, SOLUTION INTRAVENOUS at 10:39

## 2018-11-04 RX ADMIN — POTASSIUM CHLORIDE 10 MEQ: 7.46 INJECTION, SOLUTION INTRAVENOUS at 20:20

## 2018-11-04 ASSESSMENT — ENCOUNTER SYMPTOMS
BLURRED VISION: 0
LOSS OF CONSCIOUSNESS: 0
NERVOUS/ANXIOUS: 0
COUGH: 0
BLOOD IN STOOL: 0
STRIDOR: 0
FEVER: 0
SINUS PAIN: 0
HEMOPTYSIS: 0
HEADACHES: 0
EYE DISCHARGE: 0
NAUSEA: 1
DEPRESSION: 0
BACK PAIN: 0
EYE PAIN: 0
CLAUDICATION: 0
WHEEZING: 0
SEIZURES: 0
BRUISES/BLEEDS EASILY: 0
FOCAL WEAKNESS: 0
DOUBLE VISION: 0
SPEECH CHANGE: 0
WEAKNESS: 1
PALPITATIONS: 0
DIAPHORESIS: 0
VOMITING: 0
NECK PAIN: 0
ABDOMINAL PAIN: 1
MYALGIAS: 0
HALLUCINATIONS: 0
CHILLS: 0
NAUSEA: 0

## 2018-11-04 ASSESSMENT — PATIENT HEALTH QUESTIONNAIRE - PHQ9
1. LITTLE INTEREST OR PLEASURE IN DOING THINGS: NOT AT ALL
SUM OF ALL RESPONSES TO PHQ9 QUESTIONS 1 AND 2: 0
2. FEELING DOWN, DEPRESSED, IRRITABLE, OR HOPELESS: NOT AT ALL

## 2018-11-04 ASSESSMENT — PAIN SCALES - GENERAL
PAINLEVEL_OUTOF10: 2
PAINLEVEL_OUTOF10: 10
PAINLEVEL_OUTOF10: 5
PAINLEVEL_OUTOF10: 5
PAINLEVEL_OUTOF10: 8
PAINLEVEL_OUTOF10: 6
PAINLEVEL_OUTOF10: 8
PAINLEVEL_OUTOF10: 2
PAINLEVEL_OUTOF10: 3
PAINLEVEL_OUTOF10: 5
PAINLEVEL_OUTOF10: 5

## 2018-11-04 NOTE — PROGRESS NOTES
Critical Care Progress Note    Date of admission  11/3/2018    Chief Complaint  28 y.o. female admitted 11/3/2018 with nausea, vomiting and abdominal pain.    Hospital Course    This lady was admitted to the ICU with acute DKA.    Interval Problem Update  Reviewed last 24 hour events:      DKA  SR-ST  Insulin gtt 3  D10 0.45% NS    Reassessed multiple times during the day  Change to D10  Continue insulin  Replete electrolytes      Review of Systems  Review of Systems   Constitutional: Positive for malaise/fatigue. Negative for chills, diaphoresis and fever.   HENT: Negative for ear pain, nosebleeds and sinus pain.    Eyes: Negative for blurred vision, double vision, pain and discharge.   Respiratory: Negative for cough, hemoptysis, wheezing and stridor.    Cardiovascular: Negative for chest pain, palpitations, claudication and leg swelling.   Gastrointestinal: Positive for abdominal pain and nausea. Negative for blood in stool and vomiting.   Genitourinary: Negative for dysuria, hematuria and urgency.   Musculoskeletal: Negative for joint pain, myalgias and neck pain.   Skin: Negative for rash.   Neurological: Negative for speech change, focal weakness, seizures, loss of consciousness and headaches.   Endo/Heme/Allergies: Negative for environmental allergies. Does not bruise/bleed easily.   Psychiatric/Behavioral: Negative for depression, hallucinations and suicidal ideas. The patient is not nervous/anxious.         Vital Signs for last 24 hours   Temp:  [36.6 °C (97.8 °F)-36.8 °C (98.3 °F)] 36.8 °C (98.2 °F)  Pulse:  [110-135] 112  Resp:  [16-32] 17  BP: (126)/(87) 126/87    Hemodynamic parameters for last 24 hours       Respiratory       Physical Exam   Physical Exam   Constitutional: She is oriented to person, place, and time.   HENT:   Head: Atraumatic.   Right Ear: External ear normal.   Left Ear: External ear normal.   Nose: Nose normal.   Mouth/Throat: Oropharynx is clear and moist.   Eyes: Pupils are equal,  round, and reactive to light. Conjunctivae are normal. Right eye exhibits no discharge. Left eye exhibits no discharge. No scleral icterus.   Neck: Neck supple. No JVD present. No tracheal deviation present.   Cardiovascular: Intact distal pulses.  Exam reveals no gallop and no friction rub.    No murmur heard.  Sinus rhythm-sinus tachycardia   Pulmonary/Chest: No stridor. She has no wheezes. She has no rales.   Abdominal: Soft. She exhibits no distension. There is tenderness (Mild along the right lateral abdomen). There is no rebound.   Musculoskeletal: She exhibits no tenderness or deformity.   No clubbing or cyanosis   Neurological: She is alert and oriented to person, place, and time. No cranial nerve deficit. Coordination normal.   No focal weakness   Skin: Skin is warm and dry. No rash noted. She is not diaphoretic.       Medications  Current Facility-Administered Medications   Medication Dose Route Frequency Provider Last Rate Last Dose   • POTASSIUM CHLORIDE 10 MEQ/100ML IV SOLN            • potassium chloride (KCL) ivpb 10 mEq  10 mEq Intravenous Q HOUR Dennis Johnson M.D.   Stopped at 11/04/18 0435   • atorvastatin (LIPITOR) tablet 20 mg  20 mg Oral QHS El Us M.D.   20 mg at 11/03/18 2310   • vitamin D (cholecalciferol) tablet 2,000 Units  2,000 Units Oral DAILY El Us M.D.   Stopped at 11/03/18 1200   • ferrous sulfate tablet 325 mg  325 mg Oral DAILY El Us M.D.       • gabapentin (NEURONTIN) capsule 100 mg  100 mg Oral BID El Us M.D.   100 mg at 11/03/18 1710   • metoclopramide (REGLAN) 5 MG/5ML solution 10 mg  10 mg Oral TID AC El Us M.D.   10 mg at 11/03/18 1707   • omeprazole (PRILOSEC) capsule 20 mg  20 mg Oral DAILY El Us M.D.   Stopped at 11/03/18 0120   • dextrose 10% and 0.45% NaCl infusion   Intravenous Continuous Dennis Johnson M.D. 200 mL/hr at 11/04/18 0317     • heparin injection 5,000 Units  5,000 Units Subcutaneous Q8HRS El  BRIGIDA Us   5,000 Units at 11/03/18 2310   • acetaminophen (TYLENOL) tablet 650 mg  650 mg Oral Q6HRS PRRONALD Us M.D.       • labetalol (NORMODYNE,TRANDATE) injection 10 mg  10 mg Intravenous Q4HRS PRN El Us M.D.       • MD ALERT-PHARMACY TO CONSULT FOR DKA MONITORING 1 Each  1 Each Other PRRONALD Us M.D.       • Adult DKA potassium(K+) replacement scale  1 Each Intravenous Q4HRS El Us M.D.   1 Each at 11/04/18 0000   • ondansetron (ZOFRAN) syringe/vial injection 4 mg  4 mg Intravenous Q4HRS PRRONALD Us M.D.   4 mg at 11/03/18 1702   • ondansetron (ZOFRAN ODT) dispertab 4 mg  4 mg Oral Q4HRS PRRONALD Us M.D.       • promethazine (PHENERGAN) tablet 12.5-25 mg  12.5-25 mg Oral Q4HRS MARK Us M.D.       • promethazine (PHENERGAN) suppository 12.5-25 mg  12.5-25 mg Rectal Q4HRS MARK Us M.D.       • prochlorperazine (COMPAZINE) injection 5-10 mg  5-10 mg Intravenous Q4HRS PRRONALD Us M.D.   10 mg at 11/03/18 2013   • senna-docusate (PERICOLACE or SENOKOT S) 8.6-50 MG per tablet 2 Tab  2 Tab Oral BID El Us M.D.        And   • polyethylene glycol/lytes (MIRALAX) PACKET 1 Packet  1 Packet Oral QDAY PRRONALD Us M.D.        And   • magnesium hydroxide (MILK OF MAGNESIA) suspension 30 mL  30 mL Oral QDAY MARK Us M.D.        And   • bisacodyl (DULCOLAX) suppository 10 mg  10 mg Rectal QDAY PRN El Us M.D.       • insulin regular human (HUMULIN/NOVOLIN R) 62.5 Units in  mL Infusion for DKA  4 Units/hr Intravenous Continuous El Us M.D. 16 mL/hr at 11/04/18 0315 4 Units/hr at 11/04/18 0315   • HYDROmorphone pf (DILAUDID) injection 0.5-2 mg  0.5-2 mg Intravenous Q4HRS PRN Michael Gayle M.D.   2 mg at 11/04/18 0323       Fluids    Intake/Output Summary (Last 24 hours) at 11/04/18 0423  Last data filed at 11/04/18 0326   Gross per 24 hour   Intake          4982.93 ml   Output             3325 ml   Net           1657.93 ml       Laboratory  Recent Labs      11/03/18   1054   [id number]  7.08*   [id number]  17.9*   [id number]  62.1*   URBC2MCL  88.4*   ARTHCO3  5*   ARTBE  -23*         Recent Labs      11/03/18   0939   11/03/18   1600  11/03/18 1955 11/03/18 2358   SODIUM  135   < >  142  143  144   POTASSIUM  4.3   < >  4.0  4.1  3.6   CHLORIDE  105   < >  120*  123*  123*   CO2  8*   < >  8*  9*  13*   BUN  14   < >  11  12  12   CREATININE  0.86   < >  0.66  0.60  0.54   MAGNESIUM  2.2   --   1.7   --   2.1   PHOSPHORUS  3.1   --   1.4*   --   2.6   CALCIUM  9.1   < >  8.0*  8.1*  8.1*    < > = values in this interval not displayed.     Recent Labs      11/03/18   0939   11/03/18 1600 11/03/18 1955 11/03/18 2358   ALTSGPT  12   --    --    --    --    ASTSGOT  10*   --    --    --    --    ALKPHOSPHAT  130*   --    --    --    --    TBILIRUBIN  0.5   --    --    --    --    LIPASE  11   --    --    --    --    GLUCOSE  475*   < >  205*  162*  133*    < > = values in this interval not displayed.     Recent Labs      11/03/18   0939   WBC  6.1   NEUTSPOLYS  46.70   LYMPHOCYTES  39.60   MONOCYTES  9.20   EOSINOPHILS  3.00   BASOPHILS  0.80   ASTSGOT  10*   ALTSGPT  12   ALKPHOSPHAT  130*   TBILIRUBIN  0.5     Recent Labs      11/03/18   0939   RBC  5.38   HEMOGLOBIN  15.7   HEMATOCRIT  47.0   PLATELETCT  222       Imaging  None    Assessment/Plan  Diabetic ketoacidosis (HCC)- (present on admission)   Assessment & Plan    Continue IV fluids  I have made multiple adjustments to her IV fluid solution based upon electrolytes and acid-base status  Titrating insulin drip  Monitor glucose hourly  Monitor serum electrolytes and acid-base status every 4 hours  Replete potassium, phosphorus and magnesium based upon electrolyte determination     Type 1 diabetes mellitus (HCC)- (present on admission)   Assessment & Plan    She is in DKA     Hematuria   Assessment & Plan    She is just finishing her menses      Gastroparesis due to DM (HCC)- (present on admission)   Assessment & Plan    Continue Reglan, 10 mg before meals  Continue home PPI          VTE:  Lovenox  Ulcer: Not Indicated  Lines: None    I have performed a physical exam and reviewed and updated ROS and Plan today (11/4/2018). In review of yesterday's note (11/3/2018), there are no changes except as documented above.     I have assessed and reassessed her acid-base status, serum electrolytes, blood glucose with titration of an insulin drip.  She is critically ill in ICU with acute DKA.  She is at increased risk for worsening metabolic and endocrine system dysfunction.    Discussed patient condition and risk of morbidity and/or mortality with Hospitalist, RN, RT, Pharmacy, Charge nurse / hot rounds and QA team     The patient remains critically ill.  Critical care time = 35 minutes in directly providing and coordinating critical care and extensive data review.  No time overlap and excludes procedures.    Michael Gayle MD  Pulmonary and Critical Care Medicine

## 2018-11-04 NOTE — PROGRESS NOTES
Hospital Medicine Daily Progress Note    Date of Service  11/4/2018    Chief Complaint  28 y.o. female admitted 11/3/2018 with nausea and vommiting    Hospital Course    Ms Sparks has a history of type 1 diabetes and gastroparesis.  She is well-known to the service and is frequently admitted for diabetic ketoacidosis.  She was admitted on 11/3/2018 with a chief complaint nausea vomiting, she was found to be in diabetic ketoacidosis, she was transferred to the ICU and started on insulin drip      Interval Problem Update  Admitted for DKA  She is on an insulin drip at 3 units per hour  NPO with ice chips, no N/V, complains of 5/10 generalized abdominal pain, no radiation, better with dilaudid    Consultants/Specialty  Critical Care, I discussed the patient's condition with Dr. Gayle this morning on ICU Rounds    Code Status  Full code    Disposition  Remains critically ill, on insulin drip, keep in ICU    Review of Systems  Review of Systems   Constitutional: Positive for malaise/fatigue.   Gastrointestinal: Positive for abdominal pain. Negative for nausea and vomiting.   Musculoskeletal: Negative for back pain and neck pain.   Skin: Negative for itching and rash.   Neurological: Positive for weakness.        Physical Exam  Temp:  [36.1 °C (97 °F)-36.8 °C (98.2 °F)] 36.5 °C (97.7 °F)  Pulse:  [] 101  Resp:  [11-31] 18    Physical Exam   Constitutional: She is oriented to person, place, and time. She appears well-developed and well-nourished.   HENT:   Head: Normocephalic and atraumatic.   Eyes: Conjunctivae and EOM are normal. Right eye exhibits no discharge. Left eye exhibits no discharge.   Cardiovascular: Normal rate, regular rhythm and intact distal pulses.    No murmur heard.  2+ Radial Pulses  Brisk Capillary Refill   Pulmonary/Chest: Effort normal and breath sounds normal. No respiratory distress. She has no wheezes.   Abdominal: Soft. Bowel sounds are normal. She exhibits no distension. There  is tenderness. There is no rebound.   Musculoskeletal: Normal range of motion. She exhibits no edema.   Neurological: She is oriented to person, place, and time. No cranial nerve deficit.   lethargic   Skin: Skin is warm and dry. She is not diaphoretic. No erythema.   Skin is warm and well perfused   Psychiatric: She has a normal mood and affect.       Fluids    Intake/Output Summary (Last 24 hours) at 11/04/18 1019  Last data filed at 11/04/18 1000   Gross per 24 hour   Intake          8873.88 ml   Output             3475 ml   Net          5398.88 ml       Laboratory  Recent Labs      11/03/18   0939   WBC  6.1   RBC  5.38   HEMOGLOBIN  15.7   HEMATOCRIT  47.0   MCV  87.4   MCH  29.2   MCHC  33.4*   RDW  44.4   PLATELETCT  222   MPV  10.1     Recent Labs      11/03/18   2358  11/04/18   0500  11/04/18   0834   SODIUM  144  141  139   POTASSIUM  3.6  3.4*  3.7   CHLORIDE  123*  120*  117*   CO2  13*  16*  17*   GLUCOSE  133*  107*  166*   BUN  12  13  12   CREATININE  0.54  0.56  0.49*   CALCIUM  8.1*  8.1*  8.0*                   Imaging  No orders to display        Assessment/Plan  Diabetic ketoacidosis (HCC)- (present on admission)   Assessment & Plan    Likely due to  Non-compliance  DKA is slow to resolve, continue insulin drip  Change to D10 for hyperchloremia         High anion gap metabolic acidosis   Assessment & Plan    Due to DKA, beta hydroxybutyrate greater than 8  Continue DKA protocol     Type 1 diabetes mellitus (HCC)- (present on admission)   Assessment & Plan    Poorly controlled with frequent admissions for DKA     SIRS (systemic inflammatory response syndrome) (HCC)- (present on admission)   Assessment & Plan    Patient tachycardic and tachypneic secondary to DKA  This is not sepsis  Patient not requiring antibiotics     Gastroparesis due to DM (HCC)- (present on admission)   Assessment & Plan    Continue Reglan          VTE prophylaxis: lovenox

## 2018-11-04 NOTE — PROGRESS NOTES
Late entry due to system downtime.    11/04/2018:    0232: MD paged about blood sugars less than 100, new orders received to increase rate of IVFs see MAR.    0308: MD paged again about BS less than 100, new orders received to increase rate of D10 1/2 NS to 200 ml/hr and decrease insulin gtt to 4 units/hr, see MAR.    0418: MD Johnson updated on blood glucose of 95 new orders received to decrease the insulin gtt to 3 units/hr.

## 2018-11-04 NOTE — CARE PLAN
Problem: Safety  Goal: Will remain free from injury  Outcome: PROGRESSING AS EXPECTED  Bed is locked and in lowest position with treaded socks on and call light within reach. Patient educated regarding safety. Verbalizes understanding and calls appropriately for assistance. Bed alarm on    Problem: Knowledge Deficit  Goal: Knowledge of disease process/condition, treatment plan, diagnostic tests, and medications will improve  Outcome: PROGRESSING AS EXPECTED  Pt educated on POC, current medications and doses and disease process. All questions answered.

## 2018-11-05 ENCOUNTER — APPOINTMENT (OUTPATIENT)
Dept: RADIOLOGY | Facility: REHABILITATION | Age: 28
DRG: 638 | End: 2018-11-05
Attending: HOSPITALIST
Payer: MEDICAID

## 2018-11-05 LAB
ALBUMIN SERPL BCP-MCNC: 3.2 G/DL (ref 3.2–4.9)
ALBUMIN/GLOB SERPL: 2 G/DL
ALP SERPL-CCNC: 71 U/L (ref 30–99)
ALT SERPL-CCNC: 10 U/L (ref 2–50)
ANION GAP SERPL CALC-SCNC: 6 MMOL/L (ref 0–11.9)
AST SERPL-CCNC: 30 U/L (ref 12–45)
BILIRUB SERPL-MCNC: 0.6 MG/DL (ref 0.1–1.5)
BUN SERPL-MCNC: 7 MG/DL (ref 8–22)
CALCIUM SERPL-MCNC: 8.1 MG/DL (ref 8.5–10.5)
CHLORIDE SERPL-SCNC: 112 MMOL/L (ref 96–112)
CO2 SERPL-SCNC: 19 MMOL/L (ref 20–33)
CREAT SERPL-MCNC: 0.56 MG/DL (ref 0.5–1.4)
ERYTHROCYTE [DISTWIDTH] IN BLOOD BY AUTOMATED COUNT: 44.5 FL (ref 35.9–50)
GLOBULIN SER CALC-MCNC: 1.6 G/DL (ref 1.9–3.5)
GLUCOSE BLD-MCNC: 112 MG/DL (ref 65–99)
GLUCOSE BLD-MCNC: 134 MG/DL (ref 65–99)
GLUCOSE BLD-MCNC: 202 MG/DL (ref 65–99)
GLUCOSE BLD-MCNC: 230 MG/DL (ref 65–99)
GLUCOSE BLD-MCNC: 303 MG/DL (ref 65–99)
GLUCOSE BLD-MCNC: 311 MG/DL (ref 65–99)
GLUCOSE BLD-MCNC: 337 MG/DL (ref 65–99)
GLUCOSE BLD-MCNC: 92 MG/DL (ref 65–99)
GLUCOSE SERPL-MCNC: 335 MG/DL (ref 65–99)
HCT VFR BLD AUTO: 35.8 % (ref 37–47)
HGB BLD-MCNC: 12.1 G/DL (ref 12–16)
MAGNESIUM SERPL-MCNC: 1.8 MG/DL (ref 1.5–2.5)
MCH RBC QN AUTO: 29.1 PG (ref 27–33)
MCHC RBC AUTO-ENTMCNC: 33.5 G/DL (ref 33.6–35)
MCV RBC AUTO: 86.7 FL (ref 81.4–97.8)
PHOSPHATE SERPL-MCNC: 2.4 MG/DL (ref 2.5–4.5)
PLATELET # BLD AUTO: 147 K/UL (ref 164–446)
PMV BLD AUTO: 10.5 FL (ref 9–12.9)
POTASSIUM SERPL-SCNC: 4.8 MMOL/L (ref 3.6–5.5)
PROT SERPL-MCNC: 4.8 G/DL (ref 6–8.2)
RBC # BLD AUTO: 4.13 M/UL (ref 4.2–5.4)
SODIUM SERPL-SCNC: 137 MMOL/L (ref 135–145)
WBC # BLD AUTO: 4.3 K/UL (ref 4.8–10.8)

## 2018-11-05 PROCEDURE — 700105 HCHG RX REV CODE 258: Performed by: INTERNAL MEDICINE

## 2018-11-05 PROCEDURE — 83735 ASSAY OF MAGNESIUM: CPT

## 2018-11-05 PROCEDURE — 99233 SBSQ HOSP IP/OBS HIGH 50: CPT | Performed by: INTERNAL MEDICINE

## 2018-11-05 PROCEDURE — A9270 NON-COVERED ITEM OR SERVICE: HCPCS | Performed by: HOSPITALIST

## 2018-11-05 PROCEDURE — 700111 HCHG RX REV CODE 636 W/ 250 OVERRIDE (IP): Performed by: HOSPITALIST

## 2018-11-05 PROCEDURE — 85027 COMPLETE CBC AUTOMATED: CPT

## 2018-11-05 PROCEDURE — 99232 SBSQ HOSP IP/OBS MODERATE 35: CPT | Performed by: HOSPITALIST

## 2018-11-05 PROCEDURE — 700102 HCHG RX REV CODE 250 W/ 637 OVERRIDE(OP): Performed by: HOSPITALIST

## 2018-11-05 PROCEDURE — 770001 HCHG ROOM/CARE - MED/SURG/GYN PRIV*

## 2018-11-05 PROCEDURE — 700111 HCHG RX REV CODE 636 W/ 250 OVERRIDE (IP): Performed by: INTERNAL MEDICINE

## 2018-11-05 PROCEDURE — 84100 ASSAY OF PHOSPHORUS: CPT

## 2018-11-05 PROCEDURE — 82962 GLUCOSE BLOOD TEST: CPT | Mod: 91

## 2018-11-05 PROCEDURE — 76775 US EXAM ABDO BACK WALL LIM: CPT

## 2018-11-05 PROCEDURE — 700102 HCHG RX REV CODE 250 W/ 637 OVERRIDE(OP): Performed by: INTERNAL MEDICINE

## 2018-11-05 PROCEDURE — 80053 COMPREHEN METABOLIC PANEL: CPT

## 2018-11-05 RX ORDER — DEXTROSE, SODIUM CHLORIDE, SODIUM LACTATE, POTASSIUM CHLORIDE, AND CALCIUM CHLORIDE 5; .6; .31; .03; .02 G/100ML; G/100ML; G/100ML; G/100ML; G/100ML
INJECTION, SOLUTION INTRAVENOUS CONTINUOUS
Status: DISCONTINUED | OUTPATIENT
Start: 2018-11-05 | End: 2018-11-05

## 2018-11-05 RX ORDER — POTASSIUM CHLORIDE 7.45 MG/ML
10 INJECTION INTRAVENOUS
Status: COMPLETED | OUTPATIENT
Start: 2018-11-05 | End: 2018-11-05

## 2018-11-05 RX ORDER — SODIUM CHLORIDE, SODIUM LACTATE, POTASSIUM CHLORIDE, CALCIUM CHLORIDE 600; 310; 30; 20 MG/100ML; MG/100ML; MG/100ML; MG/100ML
INJECTION, SOLUTION INTRAVENOUS CONTINUOUS
Status: DISCONTINUED | OUTPATIENT
Start: 2018-11-05 | End: 2018-11-05

## 2018-11-05 RX ORDER — INSULIN GLARGINE 100 [IU]/ML
30 INJECTION, SOLUTION SUBCUTANEOUS 2 TIMES DAILY
Status: DISCONTINUED | OUTPATIENT
Start: 2018-11-05 | End: 2018-11-06 | Stop reason: HOSPADM

## 2018-11-05 RX ADMIN — INSULIN GLARGINE 30 UNITS: 100 INJECTION, SOLUTION SUBCUTANEOUS at 02:02

## 2018-11-05 RX ADMIN — INSULIN LISPRO 6 UNITS: 100 INJECTION, SOLUTION INTRAVENOUS; SUBCUTANEOUS at 09:36

## 2018-11-05 RX ADMIN — PROCHLORPERAZINE EDISYLATE 10 MG: 5 INJECTION INTRAMUSCULAR; INTRAVENOUS at 05:27

## 2018-11-05 RX ADMIN — INSULIN GLARGINE 30 UNITS: 100 INJECTION, SOLUTION SUBCUTANEOUS at 18:37

## 2018-11-05 RX ADMIN — ATORVASTATIN CALCIUM 20 MG: 20 TABLET, FILM COATED ORAL at 19:55

## 2018-11-05 RX ADMIN — METOCLOPRAMIDE HYDROCHLORIDE 10 MG: 5 SOLUTION ORAL at 14:24

## 2018-11-05 RX ADMIN — ONDANSETRON 4 MG: 2 INJECTION INTRAMUSCULAR; INTRAVENOUS at 03:09

## 2018-11-05 RX ADMIN — PROCHLORPERAZINE EDISYLATE 10 MG: 5 INJECTION INTRAMUSCULAR; INTRAVENOUS at 23:19

## 2018-11-05 RX ADMIN — PROCHLORPERAZINE EDISYLATE 10 MG: 5 INJECTION INTRAMUSCULAR; INTRAVENOUS at 09:42

## 2018-11-05 RX ADMIN — POTASSIUM CHLORIDE 10 MEQ: 7.46 INJECTION, SOLUTION INTRAVENOUS at 05:23

## 2018-11-05 RX ADMIN — INSULIN LISPRO 6 UNITS: 100 INJECTION, SOLUTION INTRAVENOUS; SUBCUTANEOUS at 18:36

## 2018-11-05 RX ADMIN — OMEPRAZOLE 20 MG: 20 CAPSULE, DELAYED RELEASE ORAL at 05:23

## 2018-11-05 RX ADMIN — POTASSIUM CHLORIDE 10 MEQ: 7.46 INJECTION, SOLUTION INTRAVENOUS at 01:30

## 2018-11-05 RX ADMIN — POTASSIUM CHLORIDE 10 MEQ: 7.46 INJECTION, SOLUTION INTRAVENOUS at 04:20

## 2018-11-05 RX ADMIN — GABAPENTIN 100 MG: 100 CAPSULE ORAL at 05:23

## 2018-11-05 RX ADMIN — HYDROMORPHONE HYDROCHLORIDE 1 MG: 1 INJECTION, SOLUTION INTRAMUSCULAR; INTRAVENOUS; SUBCUTANEOUS at 14:35

## 2018-11-05 RX ADMIN — VITAMIN D, TAB 1000IU (100/BT) 2000 UNITS: 25 TAB at 05:23

## 2018-11-05 RX ADMIN — SODIUM CHLORIDE, SODIUM LACTATE, POTASSIUM CHLORIDE, CALCIUM CHLORIDE AND DEXTROSE MONOHYDRATE: 5; 600; 310; 30; 20 INJECTION, SOLUTION INTRAVENOUS at 01:26

## 2018-11-05 RX ADMIN — METOCLOPRAMIDE HYDROCHLORIDE 10 MG: 5 SOLUTION ORAL at 09:35

## 2018-11-05 RX ADMIN — POTASSIUM CHLORIDE 10 MEQ: 7.46 INJECTION, SOLUTION INTRAVENOUS at 02:59

## 2018-11-05 RX ADMIN — HYDROMORPHONE HYDROCHLORIDE 1.5 MG: 1 INJECTION, SOLUTION INTRAMUSCULAR; INTRAVENOUS; SUBCUTANEOUS at 23:20

## 2018-11-05 RX ADMIN — ONDANSETRON 4 MG: 2 INJECTION INTRAMUSCULAR; INTRAVENOUS at 20:01

## 2018-11-05 RX ADMIN — GABAPENTIN 100 MG: 100 CAPSULE ORAL at 18:34

## 2018-11-05 RX ADMIN — INSULIN LISPRO 3 UNITS: 100 INJECTION, SOLUTION INTRAVENOUS; SUBCUTANEOUS at 20:00

## 2018-11-05 RX ADMIN — SODIUM CHLORIDE, POTASSIUM CHLORIDE, SODIUM LACTATE AND CALCIUM CHLORIDE: 600; 310; 30; 20 INJECTION, SOLUTION INTRAVENOUS at 06:01

## 2018-11-05 RX ADMIN — INSULIN LISPRO 3 UNITS: 100 INJECTION, SOLUTION INTRAVENOUS; SUBCUTANEOUS at 14:23

## 2018-11-05 RX ADMIN — METOCLOPRAMIDE HYDROCHLORIDE 10 MG: 5 SOLUTION ORAL at 18:33

## 2018-11-05 RX ADMIN — ENOXAPARIN SODIUM 40 MG: 100 INJECTION SUBCUTANEOUS at 05:24

## 2018-11-05 RX ADMIN — PROCHLORPERAZINE EDISYLATE 10 MG: 5 INJECTION INTRAMUSCULAR; INTRAVENOUS at 18:39

## 2018-11-05 RX ADMIN — FERROUS SULFATE TAB 325 MG (65 MG ELEMENTAL FE) 325 MG: 325 (65 FE) TAB at 05:23

## 2018-11-05 RX ADMIN — HYDROMORPHONE HYDROCHLORIDE 2 MG: 1 INJECTION, SOLUTION INTRAMUSCULAR; INTRAVENOUS; SUBCUTANEOUS at 03:20

## 2018-11-05 ASSESSMENT — ENCOUNTER SYMPTOMS
BRUISES/BLEEDS EASILY: 0
SHORTNESS OF BREATH: 0
FLANK PAIN: 1
FEVER: 0
ABDOMINAL PAIN: 1
HEADACHES: 0
BLOOD IN STOOL: 0
CHILLS: 1
WEAKNESS: 1
EYE REDNESS: 0
NAUSEA: 0
DIZZINESS: 0
DEPRESSION: 0
BACK PAIN: 0
NERVOUS/ANXIOUS: 0
WEAKNESS: 0
SORE THROAT: 0
SPUTUM PRODUCTION: 0
PALPITATIONS: 0
FALLS: 0
VOMITING: 0
WHEEZING: 0
BACK PAIN: 1
BLURRED VISION: 0
NAUSEA: 1
SEIZURES: 0
COUGH: 1
MYALGIAS: 1
DIAPHORESIS: 0
NECK PAIN: 0

## 2018-11-05 ASSESSMENT — PAIN SCALES - GENERAL
PAINLEVEL_OUTOF10: 2
PAINLEVEL_OUTOF10: 4
PAINLEVEL_OUTOF10: 2
PAINLEVEL_OUTOF10: 8
PAINLEVEL_OUTOF10: 8
PAINLEVEL_OUTOF10: 3
PAINLEVEL_OUTOF10: 2
PAINLEVEL_OUTOF10: 6
PAINLEVEL_OUTOF10: 6
PAINLEVEL_OUTOF10: 3
PAINLEVEL_OUTOF10: 3

## 2018-11-05 NOTE — PROGRESS NOTES
[facility] Medicine Daily Progress Note    Date of Service  11/5/2018    Chief Complaint  28 y.o. female admitted 11/3/2018 with nausea and vommiting    Hospital Course    Ms Sparks has a history of type 1 diabetes and gastroparesis.  She is well-known to the service and is frequently admitted for diabetic ketoacidosis.  She was admitted on 11/3/2018 with a chief complaint nausea vomiting, she was found to be in diabetic ketoacidosis, she was transferred to the ICU and started on insulin drip      Interval Problem Update  Transitioned off insulin drip this morning, FSBG's 200's-300's  Has been able to eat a little, mild nausea, no vomiting, mild abdominal pain    Consultants/Specialty  Critical Care, I discussed the patient's condition with Dr. Gonda on ICU rounds this morning    Code Status  Full code    Disposition  Transfer to medical, orders placed 11/5/2018    Review of Systems  Review of Systems   Constitutional: Positive for malaise/fatigue.   Gastrointestinal: Positive for abdominal pain. Negative for nausea and vomiting.   Musculoskeletal: Negative for back pain and neck pain.   Skin: Negative for itching and rash.   Neurological: Positive for weakness.        Physical Exam  Temp:  [36.2 °C (97.2 °F)-37.2 °C (99 °F)] 36.2 °C (97.2 °F)  Pulse:  [] 88  Resp:  [12-30] 14    Physical Exam   Constitutional: She is oriented to person, place, and time. She appears well-developed and well-nourished.   HENT:   Head: Normocephalic and atraumatic.   Eyes: Conjunctivae and EOM are normal. Right eye exhibits no discharge. Left eye exhibits no discharge.   Cardiovascular: Normal rate, regular rhythm and intact distal pulses.    No murmur heard.  Pulmonary/Chest: Effort normal and breath sounds normal. No respiratory distress. She has no wheezes.   Abdominal: Soft. Bowel sounds are normal. She exhibits no distension. There is tenderness. There is no rebound.   Musculoskeletal: Normal range of motion. She exhibits no  edema.   Neurological: She is oriented to person, place, and time. No cranial nerve deficit.   lethargic   Skin: Skin is warm and dry. She is not diaphoretic. No erythema.   Skin is warm and well perfused   Psychiatric: She has a normal mood and affect.       Fluids    Intake/Output Summary (Last 24 hours) at 11/05/18 1026  Last data filed at 11/05/18 0600   Gross per 24 hour   Intake           4851.7 ml   Output             2300 ml   Net           2551.7 ml       Laboratory  Recent Labs      11/03/18   0939  11/05/18   0744   WBC  6.1  4.3*   RBC  5.38  4.13*   HEMOGLOBIN  15.7  12.1   HEMATOCRIT  47.0  35.8*   MCV  87.4  86.7   MCH  29.2  29.1   MCHC  33.4*  33.5*   RDW  44.4  44.5   PLATELETCT  222  147*   MPV  10.1  10.5     Recent Labs      11/04/18   1702  11/04/18   2312  11/05/18   0710   SODIUM  138  138  137   POTASSIUM  3.2*  3.3*  4.8   CHLORIDE  115*  114*  112   CO2  18*  18*  19*   GLUCOSE  193*  179*  335*   BUN  9  6*  7*   CREATININE  0.49*  0.42*  0.56   CALCIUM  8.4*  8.2*  8.1*                   Imaging  US-RENAL   Final Result      Normal renal ultrasound.           Assessment/Plan  Diabetic ketoacidosis (HCC)- (present on admission)   Assessment & Plan    Likely due to non-compliance  She is off the insulin drip  Lantus and sliding scale       High anion gap metabolic acidosis   Assessment & Plan    Due to DKA, acidosis is resolved     Type 1 diabetes mellitus (HCC)- (present on admission)   Assessment & Plan    Poorly controlled with frequent admissions for DKA     Flank pain- (present on admission)   Assessment & Plan    Complains of left-sided flank pain  Check renal ultrasound     SIRS (systemic inflammatory response syndrome) (HCC)- (present on admission)   Assessment & Plan    Patient tachycardic and tachypneic secondary to DKA  This is not sepsis  Patient not requiring antibiotics     Gastroparesis due to DM (HCC)- (present on admission)   Assessment & Plan    Continue Reglan           VTE prophylaxis: lovenox

## 2018-11-05 NOTE — PROGRESS NOTES
Lab called with critical result of potassium of 7.  Dr. Gayle notified of critical lab result. Orders received to redraw BMP

## 2018-11-05 NOTE — PROGRESS NOTES
Critical Care Progress Note    Date of admission  11/3/2018    Chief Complaint  28 y.o. female admitted 11/3/2018 with nausea, vomiting and abdominal pain.    Hospital Course    This lady was admitted to the ICU with acute DKA.    Interval Problem Update  Reviewed last 24 hour events:   - transitioned off insulin gtt this morning   - R flank pain, abdominal   - AAOx4   - sinus tach, SBP 90-140s   - AF, WBC dropped to 4.3   - last BM 11/2   - (+) N/V but tolerated diet this morning   - plts down to 147   - low phos and Mag    Review of Systems  Review of Systems   Constitutional: Positive for chills and malaise/fatigue. Negative for diaphoresis and fever.   HENT: Negative for congestion and sore throat.    Eyes: Negative for blurred vision and redness.   Respiratory: Positive for cough. Negative for sputum production, shortness of breath and wheezing.    Cardiovascular: Negative for chest pain, palpitations and leg swelling.   Gastrointestinal: Positive for abdominal pain and nausea. Negative for blood in stool and vomiting.   Genitourinary: Positive for flank pain. Negative for frequency, hematuria and urgency.   Musculoskeletal: Positive for back pain and myalgias. Negative for falls.   Skin: Negative for rash.   Neurological: Negative for dizziness, seizures, weakness and headaches.   Endo/Heme/Allergies: Does not bruise/bleed easily.   Psychiatric/Behavioral: Negative for depression. The patient is not nervous/anxious.    All other systems reviewed and are negative.       Vital Signs for last 24 hours   Temp:  [36.4 °C (97.5 °F)-37.2 °C (99 °F)] 36.8 °C (98.2 °F)  Pulse:  [] 86  Resp:  [12-30] 14    Hemodynamic parameters for last 24 hours       Respiratory   2 lpm n/c    Physical Exam   Physical Exam   Constitutional: She is oriented to person, place, and time. She appears well-developed and well-nourished.  Non-toxic appearance. No distress.   HENT:   Head: Normocephalic and atraumatic.   Nose: Nose  normal.   Mouth/Throat: Oropharynx is clear and moist. No oropharyngeal exudate.   Eyes: Pupils are equal, round, and reactive to light. Conjunctivae are normal. Right eye exhibits no discharge. Left eye exhibits no discharge.   Neck: Neck supple. No JVD present. No tracheal deviation present.   Cardiovascular: Normal rate, regular rhythm, normal heart sounds and intact distal pulses.    No murmur heard.  Pulmonary/Chest: Effort normal and breath sounds normal. No respiratory distress. She has no wheezes.   Abdominal: Soft. Bowel sounds are normal. She exhibits no distension. There is no tenderness.   Musculoskeletal: She exhibits no edema or tenderness.   Lymphadenopathy:     She has no cervical adenopathy.   Neurological: She is alert and oriented to person, place, and time. No cranial nerve deficit. She exhibits normal muscle tone.   Skin: Skin is warm and dry. No rash noted. No erythema.   Psychiatric: She has a normal mood and affect. Her behavior is normal. Judgment and thought content normal.   Nursing note and vitals reviewed.      Medications  Current Facility-Administered Medications   Medication Dose Route Frequency Provider Last Rate Last Dose   • insulin glargine (LANTUS) injection 30 Units  30 Units Subcutaneous BID Dennis Johnson M.D.   30 Units at 11/05/18 0202   • insulin lispro (HUMALOG) injection 2-9 Units  2-9 Units Subcutaneous 4X/DAY [facility] Dennis Johnson M.D.       • lactated ringers infusion   Intravenous Continuous Dennis Johnson M.D. 83 mL/hr at 11/05/18 0601     • enoxaparin (LOVENOX) inj 40 mg  40 mg Subcutaneous DAILY Michael Gayle M.D.   40 mg at 11/05/18 0524   • atorvastatin (LIPITOR) tablet 20 mg  20 mg Oral QHS El Us M.D.   20 mg at 11/04/18 2019   • vitamin D (cholecalciferol) tablet 2,000 Units  2,000 Units Oral DAILY El Us M.D.   2,000 Units at 11/05/18 0523   • ferrous sulfate tablet 325 mg  325 mg Oral DAILY El Us M.D.   325 mg at  11/05/18 0523   • gabapentin (NEURONTIN) capsule 100 mg  100 mg Oral BID El Us M.D.   100 mg at 11/05/18 0523   • metoclopramide (REGLAN) 5 MG/5ML solution 10 mg  10 mg Oral TID AC El Us M.D.   10 mg at 11/04/18 1707   • omeprazole (PRILOSEC) capsule 20 mg  20 mg Oral DAILY El Us M.D.   20 mg at 11/05/18 0523   • acetaminophen (TYLENOL) tablet 650 mg  650 mg Oral Q6HRS PRN El Us M.D.       • labetalol (NORMODYNE,TRANDATE) injection 10 mg  10 mg Intravenous Q4HRS PRN El Us M.D.       • ondansetron (ZOFRAN) syringe/vial injection 4 mg  4 mg Intravenous Q4HRS PRN El Us M.D.   4 mg at 11/05/18 0309   • ondansetron (ZOFRAN ODT) dispertab 4 mg  4 mg Oral Q4HRS PRN El Us M.D.       • promethazine (PHENERGAN) tablet 12.5-25 mg  12.5-25 mg Oral Q4HRS PRN El Us M.D.       • promethazine (PHENERGAN) suppository 12.5-25 mg  12.5-25 mg Rectal Q4HRS PRN El Us M.D.       • prochlorperazine (COMPAZINE) injection 5-10 mg  5-10 mg Intravenous Q4HRS PRN El Us M.D.   10 mg at 11/05/18 0527   • senna-docusate (PERICOLACE or SENOKOT S) 8.6-50 MG per tablet 2 Tab  2 Tab Oral BID El Us M.D.        And   • polyethylene glycol/lytes (MIRALAX) PACKET 1 Packet  1 Packet Oral QDAY PRN El Us M.D.        And   • magnesium hydroxide (MILK OF MAGNESIA) suspension 30 mL  30 mL Oral QDAY PRN El Us M.D.        And   • bisacodyl (DULCOLAX) suppository 10 mg  10 mg Rectal QDAY PRN El Us M.D.       • HYDROmorphone pf (DILAUDID) injection 0.5-2 mg  0.5-2 mg Intravenous Q4HRS PRN Michael Gayle M.D.   2 mg at 11/05/18 0320       Fluids    Intake/Output Summary (Last 24 hours) at 11/05/18 0759  Last data filed at 11/05/18 0600   Gross per 24 hour   Intake           5999.7 ml   Output             2300 ml   Net           3699.7 ml       Laboratory  Recent Labs      11/03/18   1054   [id number]  7.08*   [id number]  17.9*    [id number]  62.1*   [id number]  88.4*   ARTHCO3  5*   ARTBE  -23*         Recent Labs      11/03/18   2358  11/04/18   0500   11/04/18 1702 11/04/18   2312 11/05/18   0710   SODIUM  144  141   < >  138  138  137   POTASSIUM  3.6  3.4*   < >  3.2*  3.3*  4.8   CHLORIDE  123*  120*   < >  115*  114*  112   CO2  13*  16*   < >  18*  18*  19*   BUN  12  13   < >  9  6*  7*   CREATININE  0.54  0.56   < >  0.49*  0.42*  0.56   MAGNESIUM  2.1  1.9   --    --    --   1.8   PHOSPHORUS  2.6  2.2*   --    --    --   2.4*   CALCIUM  8.1*  8.1*   < >  8.4*  8.2*  8.1*    < > = values in this interval not displayed.     Recent Labs      11/03/18   0939   11/04/18 1702 11/04/18 2312 11/05/18   0710   ALTSGPT  12   --    --    --   10   ASTSGOT  10*   --    --    --   30   ALKPHOSPHAT  130*   --    --    --   71   TBILIRUBIN  0.5   --    --    --   0.6   LIPASE  11   --    --    --    --    GLUCOSE  475*   < >  193*  179*  335*    < > = values in this interval not displayed.     Recent Labs      11/03/18   0939  11/05/18   0710   WBC  6.1   --    NEUTSPOLYS  46.70   --    LYMPHOCYTES  39.60   --    MONOCYTES  9.20   --    EOSINOPHILS  3.00   --    BASOPHILS  0.80   --    ASTSGOT  10*  30   ALTSGPT  12  10   ALKPHOSPHAT  130*  71   TBILIRUBIN  0.5  0.6     Recent Labs      11/03/18   0939   RBC  5.38   HEMOGLOBIN  15.7   HEMATOCRIT  47.0   PLATELETCT  222       Imaging  None    Assessment/Plan  Diabetic ketoacidosis (HCC)- (present on admission)   Assessment & Plan    Improving, possibly precipitated by a viral syndrome  Discontinue insulin drip  Transition to long and short acting insulin  Discontinue IV fluids once tolerating adequate p.o.  Diabetic diet  Diabetic education     High anion gap metabolic acidosis   Assessment & Plan    Improving  Monitor with serial BMP     Type 1 diabetes mellitus (HCC)- (present on admission)   Assessment & Plan    Diabetes education     Thrombocytopenia (HCC)- (present on admission)    Assessment & Plan    Daily CBC     Hematuria   Assessment & Plan    Resolved, felt to be more vaginal bleeding and not hematuria on review     Hypomagnesemia- (present on admission)   Assessment & Plan    Repletion and monitor daily     Flank pain- (present on admission)   Assessment & Plan    Doubt nephrolithiasis/pyelonephritis  Analgesia with NSAIDs     SIRS (systemic inflammatory response syndrome) ([facility])- (present on admission)   Assessment & Plan    Improving  No sign of serious bacterial illness   Monitor off of antibiotics for now     Hypophosphatemia- (present on admission)   Assessment & Plan    Repletion and monitor daily     Gastroparesis due to DM ([facility])- (present on admission)   Assessment & Plan    Continue Reglan, 10 mg before meals  Continue home PPI          VTE:  Lovenox  Ulcer: Not Indicated  Lines: None    I have performed a physical exam and reviewed and updated ROS and Plan today (11/5/2018). In review of yesterday's note (11/4/2018), there are no changes except as documented above.     Ok to transfer patient out of ICU today. [facility] will sign off at transfer. Please call with questions.    Discussed patient condition and risk of morbidity and/or mortality with RN, RT, Pharmacy, Charge nurse / hot rounds, QA team, Patient and Dr. Benítez

## 2018-11-05 NOTE — CARE PLAN
Problem: Safety  Goal: Will remain free from injury  Outcome: PROGRESSING AS EXPECTED  Bed alarm on    Problem: Infection  Goal: Will remain free from infection  Outcome: PROGRESSING AS EXPECTED  No s/s of infection      Problem: Pain Management  Goal: Pain level will decrease to patient's comfort goal  Outcome: PROGRESSING AS EXPECTED  Utilizing pain scale and PRN meds    Problem: Fluid Volume:  Goal: Will maintain balanced intake and output  Outcome: PROGRESSING AS EXPECTED  Monitoring IO

## 2018-11-05 NOTE — CARE PLAN
Problem: Venous Thromboembolism (VTW)/Deep Vein Thrombosis (DVT) Prevention:  Goal: Patient will participate in Venous Thrombosis (VTE)/Deep Vein Thrombosis (DVT)Prevention Measures  Outcome: PROGRESSING AS EXPECTED  Pt refusing SCDs. Education provided. Pt educated on VTE/DVT risk    Problem: Pain Management  Goal: Pain level will decrease to patient's comfort goal  Outcome: PROGRESSING AS EXPECTED  Pt reporting pain relief from PRN medication. Pt educated on nonpharmacologic interventions

## 2018-11-06 VITALS
TEMPERATURE: 97.7 F | WEIGHT: 174.6 LBS | BODY MASS INDEX: 29.09 KG/M2 | OXYGEN SATURATION: 94 % | SYSTOLIC BLOOD PRESSURE: 126 MMHG | RESPIRATION RATE: 18 BRPM | HEIGHT: 65 IN | DIASTOLIC BLOOD PRESSURE: 87 MMHG | HEART RATE: 95 BPM

## 2018-11-06 PROBLEM — E83.39 HYPOPHOSPHATEMIA: Status: RESOLVED | Noted: 2018-06-02 | Resolved: 2018-11-06

## 2018-11-06 PROBLEM — E83.42 HYPOMAGNESEMIA: Status: RESOLVED | Noted: 2018-06-15 | Resolved: 2018-11-06

## 2018-11-06 PROBLEM — E11.10 DIABETIC KETOACIDOSIS (HCC): Status: RESOLVED | Noted: 2018-04-08 | Resolved: 2018-11-06

## 2018-11-06 PROBLEM — R65.10 SIRS (SYSTEMIC INFLAMMATORY RESPONSE SYNDROME) (HCC): Status: RESOLVED | Noted: 2018-11-03 | Resolved: 2018-11-06

## 2018-11-06 PROBLEM — E87.29 HIGH ANION GAP METABOLIC ACIDOSIS: Status: RESOLVED | Noted: 2018-11-03 | Resolved: 2018-11-06

## 2018-11-06 LAB
ALBUMIN SERPL BCP-MCNC: 3.5 G/DL (ref 3.2–4.9)
ALBUMIN/GLOB SERPL: 1.8 G/DL
ALP SERPL-CCNC: 90 U/L (ref 30–99)
ALT SERPL-CCNC: 10 U/L (ref 2–50)
ANION GAP SERPL CALC-SCNC: 7 MMOL/L (ref 0–11.9)
AST SERPL-CCNC: 19 U/L (ref 12–45)
BILIRUB SERPL-MCNC: 0.5 MG/DL (ref 0.1–1.5)
BUN SERPL-MCNC: 10 MG/DL (ref 8–22)
CALCIUM SERPL-MCNC: 8.6 MG/DL (ref 8.5–10.5)
CHLORIDE SERPL-SCNC: 111 MMOL/L (ref 96–112)
CO2 SERPL-SCNC: 25 MMOL/L (ref 20–33)
CREAT SERPL-MCNC: 0.59 MG/DL (ref 0.5–1.4)
ERYTHROCYTE [DISTWIDTH] IN BLOOD BY AUTOMATED COUNT: 43.6 FL (ref 35.9–50)
GLOBULIN SER CALC-MCNC: 1.9 G/DL (ref 1.9–3.5)
GLUCOSE BLD-MCNC: 138 MG/DL (ref 65–99)
GLUCOSE BLD-MCNC: 174 MG/DL (ref 65–99)
GLUCOSE SERPL-MCNC: 196 MG/DL (ref 65–99)
HCT VFR BLD AUTO: 37.5 % (ref 37–47)
HGB BLD-MCNC: 13 G/DL (ref 12–16)
MCH RBC QN AUTO: 30 PG (ref 27–33)
MCHC RBC AUTO-ENTMCNC: 34.7 G/DL (ref 33.6–35)
MCV RBC AUTO: 86.6 FL (ref 81.4–97.8)
PLATELET # BLD AUTO: 158 K/UL (ref 164–446)
PMV BLD AUTO: 10.9 FL (ref 9–12.9)
POTASSIUM SERPL-SCNC: 3.7 MMOL/L (ref 3.6–5.5)
PROT SERPL-MCNC: 5.4 G/DL (ref 6–8.2)
RBC # BLD AUTO: 4.33 M/UL (ref 4.2–5.4)
SODIUM SERPL-SCNC: 143 MMOL/L (ref 135–145)
WBC # BLD AUTO: 4.7 K/UL (ref 4.8–10.8)

## 2018-11-06 PROCEDURE — 82962 GLUCOSE BLOOD TEST: CPT

## 2018-11-06 PROCEDURE — A9270 NON-COVERED ITEM OR SERVICE: HCPCS | Performed by: HOSPITALIST

## 2018-11-06 PROCEDURE — 99238 HOSP IP/OBS DSCHRG MGMT 30/<: CPT | Performed by: HOSPITALIST

## 2018-11-06 PROCEDURE — 700102 HCHG RX REV CODE 250 W/ 637 OVERRIDE(OP): Performed by: INTERNAL MEDICINE

## 2018-11-06 PROCEDURE — 700111 HCHG RX REV CODE 636 W/ 250 OVERRIDE (IP): Performed by: INTERNAL MEDICINE

## 2018-11-06 PROCEDURE — 99232 SBSQ HOSP IP/OBS MODERATE 35: CPT | Performed by: INTERNAL MEDICINE

## 2018-11-06 PROCEDURE — 700102 HCHG RX REV CODE 250 W/ 637 OVERRIDE(OP): Performed by: HOSPITALIST

## 2018-11-06 PROCEDURE — 85027 COMPLETE CBC AUTOMATED: CPT

## 2018-11-06 PROCEDURE — 80053 COMPREHEN METABOLIC PANEL: CPT

## 2018-11-06 PROCEDURE — 700111 HCHG RX REV CODE 636 W/ 250 OVERRIDE (IP): Performed by: HOSPITALIST

## 2018-11-06 RX ORDER — ONDANSETRON 4 MG/1
4 TABLET, ORALLY DISINTEGRATING ORAL EVERY 6 HOURS PRN
Qty: 15 TAB | Refills: 2 | Status: SHIPPED | OUTPATIENT
Start: 2018-11-06 | End: 2018-12-18 | Stop reason: CLARIF

## 2018-11-06 RX ADMIN — INSULIN LISPRO 2 UNITS: 100 INJECTION, SOLUTION INTRAVENOUS; SUBCUTANEOUS at 09:17

## 2018-11-06 RX ADMIN — ENOXAPARIN SODIUM 40 MG: 100 INJECTION SUBCUTANEOUS at 05:20

## 2018-11-06 RX ADMIN — OMEPRAZOLE 20 MG: 20 CAPSULE, DELAYED RELEASE ORAL at 05:20

## 2018-11-06 RX ADMIN — METOCLOPRAMIDE HYDROCHLORIDE 10 MG: 5 SOLUTION ORAL at 09:17

## 2018-11-06 RX ADMIN — ONDANSETRON 4 MG: 2 INJECTION INTRAMUSCULAR; INTRAVENOUS at 08:07

## 2018-11-06 RX ADMIN — METOCLOPRAMIDE HYDROCHLORIDE 10 MG: 5 SOLUTION ORAL at 14:17

## 2018-11-06 RX ADMIN — FERROUS SULFATE TAB 325 MG (65 MG ELEMENTAL FE) 325 MG: 325 (65 FE) TAB at 05:20

## 2018-11-06 RX ADMIN — PROCHLORPERAZINE EDISYLATE 10 MG: 5 INJECTION INTRAMUSCULAR; INTRAVENOUS at 04:38

## 2018-11-06 RX ADMIN — INSULIN GLARGINE 30 UNITS: 100 INJECTION, SOLUTION SUBCUTANEOUS at 05:23

## 2018-11-06 RX ADMIN — VITAMIN D, TAB 1000IU (100/BT) 2000 UNITS: 25 TAB at 05:21

## 2018-11-06 RX ADMIN — GABAPENTIN 100 MG: 100 CAPSULE ORAL at 05:21

## 2018-11-06 ASSESSMENT — PAIN SCALES - GENERAL
PAINLEVEL_OUTOF10: 3
PAINLEVEL_OUTOF10: 4
PAINLEVEL_OUTOF10: 5
PAINLEVEL_OUTOF10: 3

## 2018-11-06 ASSESSMENT — ENCOUNTER SYMPTOMS
NERVOUS/ANXIOUS: 0
NAUSEA: 0
POLYDIPSIA: 0
NECK PAIN: 0
BLURRED VISION: 0
SHORTNESS OF BREATH: 0
ABDOMINAL PAIN: 0
HEADACHES: 0
FEVER: 0

## 2018-11-06 ASSESSMENT — LIFESTYLE VARIABLES: EVER_SMOKED: NEVER

## 2018-11-06 NOTE — CARE PLAN
Problem: Fluid Volume:  Goal: Will maintain balanced intake and output  Outcome: PROGRESSING SLOWER THAN EXPECTED  Pt given prn medication for nausea and vomiting. Pt able to keep down 100% of dinner. Pt receiving scheduled Reglan before meals    Problem: Mobility  Goal: Risk for activity intolerance will decrease  Outcome: PROGRESSING AS EXPECTED  Pt educated on importance of mobilization and activity. Pt able to ambulate to the bedside commode with standby assistance. Pt offered to sit in chair at bedside or edge of bed. Pt refused. Pt educated

## 2018-11-06 NOTE — PROGRESS NOTES
Updated pt on plan for d/c home today. Pt states that her ride will not be able to pick her up until 1730. Pt states that she could take a taxi, but she does not have any way of getting into her home. Supervisor Viktoriya CHAVIRA updated. Ok for pt to stay until her ride can get her

## 2018-11-06 NOTE — PROGRESS NOTES
Critical Care Progress Note    Date of admission  11/3/2018    Chief Complaint  28 y.o. female admitted 11/3/2018 with nausea, vomiting and abdominal pain.    Hospital Course    This lady was admitted to the ICU with acute DKA.    Interval Problem Update  Reviewed last 24 hour events:   - low WBC but AF   - d/c home today    Review of Systems  Review of Systems   Constitutional: Negative for fever.   HENT: Negative for congestion.    Eyes: Negative for blurred vision.   Respiratory: Negative for shortness of breath.    Cardiovascular: Negative for chest pain.   Gastrointestinal: Negative for abdominal pain and nausea.   Genitourinary: Negative for dysuria.   Musculoskeletal: Negative for neck pain.   Skin: Negative for rash.   Neurological: Negative for headaches.   Endo/Heme/Allergies: Negative for polydipsia.   Psychiatric/Behavioral: Negative for suicidal ideas. The patient is not nervous/anxious.    All other systems reviewed and are negative.       Vital Signs for last 24 hours   Temp:  [36.2 °C (97.2 °F)-37.4 °C (99.4 °F)] 36.2 °C (97.2 °F)  Pulse:  [] 97  Resp:  [14-24] 18    Hemodynamic parameters for last 24 hours       Respiratory   2 lpm n/c --> RA    Physical Exam   Physical Exam   Constitutional: She is oriented to person, place, and time. She appears well-developed and well-nourished.  Non-toxic appearance. No distress.   HENT:   Head: Normocephalic and atraumatic.   Right Ear: External ear normal.   Left Ear: External ear normal.   Eyes: Pupils are equal, round, and reactive to light. Conjunctivae and EOM are normal.   Neck: Neck supple.   Cardiovascular: Normal rate, regular rhythm, normal heart sounds and intact distal pulses.  Exam reveals no gallop.    Pulmonary/Chest: Effort normal and breath sounds normal. No respiratory distress. She has no rales.   Abdominal: Soft. Bowel sounds are normal. There is no tenderness. There is no rebound.   Musculoskeletal: She exhibits no edema or deformity.    Neurological: She is alert and oriented to person, place, and time. No cranial nerve deficit. She exhibits normal muscle tone. Coordination normal.   Skin: Skin is warm and dry. She is not diaphoretic.   Psychiatric: She has a normal mood and affect. Her behavior is normal. Judgment and thought content normal.   Nursing note and vitals reviewed.      Medications  Current Facility-Administered Medications   Medication Dose Route Frequency Provider Last Rate Last Dose   • insulin glargine (LANTUS) injection 30 Units  30 Units Subcutaneous BID Dennis Johnson M.D.   30 Units at 11/06/18 0523   • insulin lispro (HUMALOG) injection 2-9 Units  2-9 Units Subcutaneous 4X/DAY [facility] Dennis Johnson M.D.   2 Units at 11/06/18 0917   • enoxaparin (LOVENOX) inj 40 mg  40 mg Subcutaneous DAILY Michael Gayle M.D.   40 mg at 11/06/18 0520   • atorvastatin (LIPITOR) tablet 20 mg  20 mg Oral QHS El Us M.D.   20 mg at 11/05/18 1955   • vitamin D (cholecalciferol) tablet 2,000 Units  2,000 Units Oral DAILY El Us M.D.   2,000 Units at 11/06/18 0521   • ferrous sulfate tablet 325 mg  325 mg Oral DAILY El Us M.D.   325 mg at 11/06/18 0520   • gabapentin (NEURONTIN) capsule 100 mg  100 mg Oral BID El Us M.D.   100 mg at 11/06/18 0521   • metoclopramide (REGLAN) 5 MG/5ML solution 10 mg  10 mg Oral TID AC lE Us M.D.   10 mg at 11/06/18 0917   • omeprazole (PRILOSEC) capsule 20 mg  20 mg Oral DAILY El Us M.D.   20 mg at 11/06/18 0520   • acetaminophen (TYLENOL) tablet 650 mg  650 mg Oral Q6HRS PRN El Us M.D.       • labetalol (NORMODYNE,TRANDATE) injection 10 mg  10 mg Intravenous Q4HRS PRN El Us M.D.       • ondansetron (ZOFRAN) syringe/vial injection 4 mg  4 mg Intravenous Q4HRS PRN El Us M.D.   4 mg at 11/06/18 0807   • ondansetron (ZOFRAN ODT) dispertab 4 mg  4 mg Oral Q4HRS PRN El Us M.D.       • promethazine (PHENERGAN) tablet 12.5-25  mg  12.5-25 mg Oral Q4HRS PRN El Us M.D.       • promethazine (PHENERGAN) suppository 12.5-25 mg  12.5-25 mg Rectal Q4HRS PRN El Us M.D.       • prochlorperazine (COMPAZINE) injection 5-10 mg  5-10 mg Intravenous Q4HRS PRN El Us M.D.   10 mg at 11/06/18 0438   • senna-docusate (PERICOLACE or SENOKOT S) 8.6-50 MG per tablet 2 Tab  2 Tab Oral BID El Us M.D.        And   • polyethylene glycol/lytes (MIRALAX) PACKET 1 Packet  1 Packet Oral QDAY PRN El Us M.D.        And   • magnesium hydroxide (MILK OF MAGNESIA) suspension 30 mL  30 mL Oral QDAY PRN El Us M.D.        And   • bisacodyl (DULCOLAX) suppository 10 mg  10 mg Rectal QDAY PRN El Us M.D.       • HYDROmorphone pf (DILAUDID) injection 0.5-2 mg  0.5-2 mg Intravenous Q4HRS PRN Michael Gayle M.D.   1.5 mg at 11/05/18 2320       Fluids    Intake/Output Summary (Last 24 hours) at 11/06/18 1045  Last data filed at 11/06/18 1000   Gross per 24 hour   Intake           571.33 ml   Output             2100 ml   Net         -1528.67 ml       Laboratory          Recent Labs      11/03/18   2358  11/04/18   0500   11/04/18   2312  11/05/18   0710  11/06/18   0440   SODIUM  144  141   < >  138  137  143   POTASSIUM  3.6  3.4*   < >  3.3*  4.8  3.7   CHLORIDE  123*  120*   < >  114*  112  111   CO2  13*  16*   < >  18*  19*  25   BUN  12  13   < >  6*  7*  10   CREATININE  0.54  0.56   < >  0.42*  0.56  0.59   MAGNESIUM  2.1  1.9   --    --   1.8   --    PHOSPHORUS  2.6  2.2*   --    --   2.4*   --    CALCIUM  8.1*  8.1*   < >  8.2*  8.1*  8.6    < > = values in this interval not displayed.     Recent Labs      11/04/18   2312  11/05/18   0710  11/06/18 0440   ALTSGPT   --   10  10   ASTSGOT   --   30  19   ALKPHOSPHAT   --   71  90   TBILIRUBIN   --   0.6  0.5   GLUCOSE  179*  335*  196*     Recent Labs      11/05/18 0710 11/05/18   0744  11/06/18 0440   WBC   --   4.3*  4.7*   ASTSGOT  30   --   19    ALTSGPT  10   --   10   ALKPHOSPHAT  71   --   90   TBILIRUBIN  0.6   --   0.5     Recent Labs      11/05/18   0744  11/06/18   0440   RBC  4.13*  4.33   HEMOGLOBIN  12.1  13.0   HEMATOCRIT  35.8*  37.5   PLATELETCT  147*  158*       Imaging  None    Assessment/Plan  Type 1 diabetes mellitus (HCC)- (present on admission)   Assessment & Plan    Diabetes education, diet     Thrombocytopenia (HCC)- (present on admission)   Assessment & Plan    Resolved     Hematuria   Assessment & Plan    Resolved, felt to be more vaginal bleeding and not hematuria on review     Gastroparesis due to DM (HCC)- (present on admission)   Assessment & Plan    Continue Reglan, 10 mg before meals  Continue home PPI          VTE:  Lovenox  Ulcer: Not Indicated  Lines: None    I have performed a physical exam and reviewed and updated ROS and Plan today (11/6/2018). In review of yesterday's note (11/5/2018), there are no changes except as documented above.     Okay to discharge home today from pulmonary/critical care standpoint.  No need for outpatient pulmonary follow-up    Discussed patient condition and risk of morbidity and/or mortality with Hospitalist, RN, RT, Pharmacy, Charge nurse / hot rounds, QA team and Patient

## 2018-11-06 NOTE — DISCHARGE SUMMARY
Discharge Summary    CHIEF COMPLAINT ON ADMISSION  Chief Complaint   Patient presents with   • High Blood Sugar   • N/V       Reason for Admission  High Sugar      Admission Date  11/3/2018    CODE STATUS  Full Code    HPI & HOSPITAL COURSE  This is a 28 y.o. female here with Diabetic ketoacidosis from unclear etiology.  Patient admitted to ICU with DKA protocol and had improvement of CO2 and closure of anion gap prior to being placed on her home insulin regimen.  She had some mild nausea but was able to tolerate a diabetic diet.  She later was feeling well enough to be discharged to home.        Therefore, she is discharged in good and stable condition to home with close outpatient follow-up.    The patient met 2-midnight criteria for an inpatient stay at the time of discharge.    Discharge Date  11/6/2018    FOLLOW UP ITEMS POST DISCHARGE  none    DISCHARGE DIAGNOSES  Active Problems:    Thrombocytopenia (HCC) POA: Yes    Type 1 diabetes mellitus (HCC) POA: Yes    Gastroparesis due to DM (HCC) POA: Yes    Hematuria POA: Unknown  Resolved Problems:    Diabetic ketoacidosis (HCC) POA: Yes    High anion gap metabolic acidosis POA: Unknown    Hypophosphatemia POA: Yes    SIRS (systemic inflammatory response syndrome) (HCC) POA: Yes    Flank pain POA: Yes    Hypomagnesemia POA: Yes      FOLLOW UP  No future appointments.  No follow-up provider specified.    MEDICATIONS ON DISCHARGE     Medication List      CONTINUE taking these medications      Instructions   atorvastatin 20 MG Tabs  Commonly known as:  LIPITOR   Take 1 Tab by mouth every day.  Dose:  20 mg     BASAGLAR KWIKPEN 100 UNIT/ML Sopn   Inject 30 Units as instructed 2 Times a Day.  Dose:  30 Units     Cholecalciferol 2000 UNIT Caps   Take 1 Cap by mouth every day.  Dose:  1 Cap     ferrous sulfate 325 (65 Fe) MG tablet   Take 325 mg by mouth every day.  Dose:  325 mg     gabapentin 100 MG Caps  Commonly known as:  NEURONTIN   Take 1 Cap by mouth 2 Times a  Day.  Dose:  100 mg     insulin glulisine 100 UNIT/ML Sopn injection  Commonly known as:  APIDRA   Inject 2-10 Units as instructed 3 times a day, with meals. FSBS base 150 For every 50 increase in blood sugar insulin doubles 200 = 2 units 250 = 4 units 300 = 8 units 350 =  16 units 400 = 32 units  Dose:  2-10 Units     metoclopramide 10 MG/10ML Soln  Commonly known as:  REGLAN   Take 10 mg by mouth 3 times a day before meals.  Dose:  10 mg     omeprazole 20 MG delayed-release capsule  Commonly known as:  PRILOSEC   Take 1 Cap by mouth every day.  Dose:  20 mg            Allergies  Allergies   Allergen Reactions   • Pcn [Penicillins] Shortness of Breath and Swelling     Per patient's Mom, patient tolerates Keflex   • Lisinopril Unspecified     Per historical: Reported pedal swelling. No facial/angioedema or rash nor respiratory symptoms.    • Promethazine Vomiting       DIET  Orders Placed This Encounter   Procedures   • Diet Order Diabetic     Standing Status:   Standing     Number of Occurrences:   1     Order Specific Question:   Diet:     Answer:   Diabetic [3]       ACTIVITY  As tolerated.  Weight bearing as tolerated    CONSULTATIONS  Critical Care    PROCEDURES  None    LABORATORY  Lab Results   Component Value Date    SODIUM 143 11/06/2018    POTASSIUM 3.7 11/06/2018    CHLORIDE 111 11/06/2018    CO2 25 11/06/2018    GLUCOSE 196 (H) 11/06/2018    BUN 10 11/06/2018    CREATININE 0.59 11/06/2018        Lab Results   Component Value Date    WBC 4.7 (L) 11/06/2018    HEMOGLOBIN 13.0 11/06/2018    HEMATOCRIT 37.5 11/06/2018    PLATELETCT 158 (L) 11/06/2018        Total time of the discharge process exceeds 20 minutes.

## 2018-11-06 NOTE — CARE PLAN
Problem: Safety  Goal: Will remain free from injury  Outcome: PROGRESSING AS EXPECTED  Bed alarm on    Problem: Venous Thromboembolism (VTW)/Deep Vein Thrombosis (DVT) Prevention:  Goal: Patient will participate in Venous Thrombosis (VTE)/Deep Vein Thrombosis (DVT)Prevention Measures  Outcome: PROGRESSING AS EXPECTED  Refusing scds but taking lovenox    Problem: Bowel/Gastric:  Goal: Normal bowel function is maintained or improved  Outcome: PROGRESSING SLOWER THAN EXPECTED  Still c/o nausea    Problem: Pain Management  Goal: Pain level will decrease to patient's comfort goal  Outcome: PROGRESSING AS EXPECTED  Pain scale explained, monitoring for increasing pain

## 2018-11-07 NOTE — PROGRESS NOTES
Confirmed with Dr. Rao patient okay to discharge. All lines and monitor discontinued. Discharge instructions and medications discussed with patient, all questions answered, patient verbalizes understanding. Follow up appointments to be scheduled by patient. Pneumonia vaccine not indicated and influenza vaccine refused. Discharge instructions, prescriptions, and personal belongings with patient. Patient down to car walking with mother

## 2018-11-07 NOTE — DISCHARGE INSTRUCTIONS
Discharge Instructions    Discharged to home by car with relative. Discharged via walking, hospital escort: Refused.  Special equipment needed: Not Applicable    Be sure to schedule a follow-up appointment with your primary care doctor or any specialists as instructed.     Discharge Plan:   Diet Plan: Discussed  Activity Level: Discussed  Confirmed Follow up Appointment: Patient to Call and Schedule Appointment  Confirmed Symptoms Management: Discussed  Medication Reconciliation Updated: Yes  Influenza Vaccine Indication: Patient Refuses    I understand that a diet low in cholesterol, fat, and sodium is recommended for good health. Unless I have been given specific instructions below for another diet, I accept this instruction as my diet prescription.   Other diet: Diabetic diet    Special Instructions: None    · Is patient discharged on Warfarin / Coumadin?   No     Depression / Suicide Risk    As you are discharged from this [facility], it is important to learn how to keep safe from harming yourself.    Recognize the warning signs:  · Abrupt changes in personality, positive or negative- including increase in energy   · Giving away possessions  · Change in eating patterns- significant weight changes-  positive or negative  · Change in sleeping patterns- unable to sleep or sleeping all the time   · Unwillingness or inability to communicate  · Depression  · Unusual sadness, discouragement and loneliness  · Talk of wanting to die  · Neglect of personal appearance   · Rebelliousness- reckless behavior  · Withdrawal from people/activities they love  · Confusion- inability to concentrate     If you or a loved one observes any of these behaviors or has concerns about self-harm, here's what you can do:  · Talk about it- your feelings and reasons for harming yourself  · Remove any means that you might use to hurt yourself (examples: pills, rope, extension cords, firearm)  · Get professional help from the  community (Mental Health, Substance Abuse, psychological counseling)  · Do not be alone:Call your Safe Contact- someone whom you trust who will be there for you.  · Call your local CRISIS HOTLINE 918-5980 or 191-168-0261  · Call your local Children's Mobile Crisis Response Team [place] (101) 298-4507 or www.[employer]  · Call the toll free National Suicide Prevention Hotlines   · National Suicide Prevention Lifeline 957-723-IUKZ (3569)  · National Hope Line Network 800-SUICIDE (753-8076)      Diabetic Ketoacidosis  Diabetic ketoacidosis is a life-threatening complication of diabetes. If it is not treated, it can cause severe dehydration and organ damage and can lead to a coma or death.  What are the causes?  This condition develops when there is not enough of the hormone insulin in the body. Insulin helps the body to break down sugar for energy. Without insulin, the body cannot break down sugar, so it breaks down fats instead. This leads to the production of acids that are called ketones. Ketones are poisonous at high levels.  This condition can be triggered by:  · Stress on the body that is brought on by an illness.  · Medicines that raise blood glucose levels.  · Not taking diabetes medicine.  What are the signs or symptoms?  Symptoms of this condition include:  · Fatigue.  · Weight loss.  · Excessive thirst.  · Light-headedness.  · Fruity or sweet-smelling breath.  · Excessive urination.  · Vision changes.  · Confusion or irritability.  · Nausea.  · Vomiting.  · Rapid breathing.  · Abdominal pain.  · Feeling flushed.  How is this diagnosed?  This condition is diagnosed based on a medical history, a physical exam, and blood tests. You may also have a urine test that checks for ketones.  How is this treated?  This condition may be treated with:  · Fluid replacement. This may be done to correct dehydration.  · Insulin injections. These may be given through the skin or through an IV tube.  · Electrolyte  replacement. Electrolytes, such as potassium and sodium, may be given in pill form or through an IV tube.  · Antibiotic medicines. These may be prescribed if your condition was caused by an infection.  Follow these instructions at home:  Eating and drinking  · Drink enough fluids to keep your urine clear or pale yellow.  · If you cannot eat, alternate between drinking fluids with sugar (such as juice) and salty fluids (such as broth or bouillon).  · If you can eat, follow your usual diet and drink sugar-free liquids, such as water.  Other Instructions   · Take insulin as directed by your health care provider. Do not skip insulin injections. Do not use  insulin.  · If your blood sugar is over 240 mg/dL, monitor your urine ketones every 4-6 hours.  · If you were prescribed an antibiotic medicine, finish all of it even if you start to feel better.  · Rest and exercise only as directed by your health care provider.  · If you get sick, call your health care provider and begin treatment quickly. Your body often needs extra insulin to fight an illness.  · Check your blood glucose levels regularly. If your blood glucose is high, drink plenty of fluids. This helps to flush out ketones.  Contact a health care provider if:  · Your blood glucose level is too high or too low.  · You have ketones in your urine.  · You have a fever.  · You cannot eat.  · You cannot tolerate fluids.  · You have been vomiting for more than 2 hours.  · You continue to have symptoms of this condition.  · You develop new symptoms.  Get help right away if:  · Your blood glucose levels continue to be high (elevated).  · Your monitor reads “high” even when you are taking insulin.  · You faint.  · You have chest pain.  · You have trouble breathing.  · You have a sudden, severe headache.  · You have sudden weakness in one arm or one leg.  · You have sudden trouble speaking or swallowing.  · You have vomiting or diarrhea that gets worse after 3  hours.  · You feel severely fatigued.  · You have trouble thinking.  · You have abdominal pain.  · You are severely dehydrated. Symptoms of severe dehydration include:  ¨ Extreme thirst.  ¨ Dry mouth.  ¨ Blue lips.  ¨ Cold hands and feet.  ¨ Rapid breathing.  This information is not intended to replace advice given to you by your health care provider. Make sure you discuss any questions you have with your health care provider.  Document Released: 12/15/2001 Document Revised: 05/25/2017 Document Reviewed: 11/25/2015  [employer] Interactive Patient Education © 2017 [employer]      Gastroparesis  Introduction  Gastroparesis, also called delayed gastric emptying, is a condition in which food takes longer than normal to empty from the stomach. The condition is usually long-lasting (chronic).  What are the causes?  This condition may be caused by:  · An endocrine disorder, such as hypothyroidism or diabetes. Diabetes is the most common cause of this condition.  · A nervous system disease, such as Parkinson disease or multiple sclerosis.  · Cancer, infection, or surgery of the stomach or vagus nerve.  · A connective tissue disorder, such as scleroderma.  · Certain medicines.  In most cases, the cause is not known.  What increases the risk?  This condition is more likely to develop in:  · People with certain disorders, including endocrine disorders, eating disorders, amyloidosis, and scleroderma.  · People with certain diseases, including Parkinson disease or multiple sclerosis.  · People with cancer or infection of the stomach or vagus nerve.  · People who have had surgery on the stomach or vagus nerve.  · People who take certain medicines.  · Women.  What are the signs or symptoms?  Symptoms of this condition include:  · An early feeling of fullness when eating.  · Nausea.  · Weight loss.  · Vomiting.  · Heartburn.  · Abdominal bloating.  · Inconsistent blood glucose levels.  · Lack of appetite.  · Acid from the stomach  coming up into the esophagus (gastroesophageal reflux).  · Spasms of the stomach.  Symptoms may come and go.  How is this diagnosed?  This condition is diagnosed with tests, such as:  · Tests that check how long it takes food to move through the stomach and intestines. These tests include:  ¨ Upper gastrointestinal (GI) series. In this test, X-rays of the intestines are taken after you drink a liquid. The liquid makes the intestines show up better on the X-rays.  ¨ Gastric emptying scintigraphy. In this test, scans are taken after you eat food that contains a small amount of radioactive material.  ¨ Wireless capsule GI monitoring system. This test involves swallowing a capsule that records information about movement through the stomach.  · Gastric manometry. This test measures electrical and muscular activity in the stomach. It is done with a thin tube that is passed down the throat and into the stomach.  · Endoscopy. This test checks for abnormalities in the lining of the stomach. It is done with a long, thin tube that is passed down the throat and into the stomach.  · An ultrasound. This test can help rule out gallbladder disease or pancreatitis as a cause of your symptoms. It uses sound waves to take pictures of the inside of your body.  How is this treated?  There is no cure for gastroparesis. This condition may be managed with:  · Treatment of the underlying condition causing the gastroparesis.  · Lifestyle changes, including exercise and dietary changes. Dietary changes can include:  ¨ Changes in what and when you eat.  ¨ Eating smaller meals more often.  ¨ Eating low-fat foods.  ¨ Eating low-fiber forms of high-fiber foods, such as cooked vegetables instead of raw vegetables.  ¨ Having liquid foods in place of solid foods. Liquid foods are easier to digest.  · Medicines. These may be given to control nausea and vomiting and to stimulate stomach muscles.  · Getting food through a feeding tube. This may be done  in severe cases.  · A gastric neurostimulator. This is a device that is inserted into the body with surgery. It helps improve stomach emptying and control nausea and vomiting.  Follow these instructions at home:  · Follow your health care provider's instructions about exercise and diet.  · Take medicines only as directed by your health care provider.  Contact a health care provider if:  · Your symptoms do not improve with treatment.  · You have new symptoms.  Get help right away if:  · You have severe abdominal pain that does not improve with treatment.  · You have nausea that does not go away.  · You cannot keep fluids down.  This information is not intended to replace advice given to you by your health care provider. Make sure you discuss any questions you have with your health care provider.  Document Released: 12/18/2006 Document Revised: 05/25/2017 Document Reviewed: 12/14/2015  © 2017 [employer]    Ondansetron oral dissolving tablet  What is this medicine?  ONDANSETRON (on DAN se reyes) is used to treat nausea and vomiting caused by chemotherapy. It is also used to prevent or treat nausea and vomiting after surgery.  This medicine may be used for other purposes; ask your health care provider or pharmacist if you have questions.  COMMON BRAND NAME(S): Zofran ODT  What should I tell my health care provider before I take this medicine?  They need to know if you have any of these conditions:  -heart disease  -history of irregular heartbeat  -liver disease  -low levels of magnesium or potassium in the blood  -an unusual or allergic reaction to ondansetron, granisetron, other medicines, foods, dyes, or preservatives  -pregnant or trying to get pregnant  -breast-feeding  How should I use this medicine?  These tablets are made to dissolve in the mouth. Do not try to push the tablet through the foil backing. With dry hands, peel away the foil backing and gently remove the tablet. Place the tablet in the mouth and allow it  to dissolve, then swallow. While you may take these tablets with water, it is not necessary to do so.  Talk to your pediatrician regarding the use of this medicine in children. Special care may be needed.  Overdosage: If you think you have taken too much of this medicine contact a poison control center or emergency room at once.  NOTE: This medicine is only for you. Do not share this medicine with others.  What if I miss a dose?  If you miss a dose, take it as soon as you can. If it is almost time for your next dose, take only that dose. Do not take double or extra doses.  What may interact with this medicine?  Do not take this medicine with any of the following medications:  -apomorphine  -certain medicines for fungal infections like fluconazole, itraconazole, ketoconazole, posaconazole, voriconazole  -cisapride  -dofetilide  -dronedarone  -pimozide  -thioridazine  -ziprasidone  This medicine may also interact with the following medications:  -carbamazepine  -certain medicines for depression, anxiety, or psychotic disturbances  -fentanyl  -linezolid  -MAOIs like Carbex, Eldepryl, Marplan, Nardil, and Parnate  -methylene blue (injected into a vein)  -other medicines that prolong the QT interval (cause an abnormal heart rhythm)  -phenytoin  -rifampicin  -tramadol  This list may not describe all possible interactions. Give your health care provider a list of all the medicines, herbs, non-prescription drugs, or dietary supplements you use. Also tell them if you smoke, drink alcohol, or use illegal drugs. Some items may interact with your medicine.  What should I watch for while using this medicine?  Check with your doctor or health care professional as soon as you can if you have any sign of an allergic reaction.  What side effects may I notice from receiving this medicine?  Side effects that you should report to your doctor or health care professional as soon as possible:  -allergic reactions like skin rash, itching  or hives, swelling of the face, lips, or tongue  -breathing problems  -confusion  -dizziness  -fast or irregular heartbeat  -feeling faint or lightheaded, falls  -fever and chills  -loss of balance or coordination  -seizures  -sweating  -swelling of the hands and feet  -tightness in the chest  -tremors  -unusually weak or tired  Side effects that usually do not require medical attention (report to your doctor or health care professional if they continue or are bothersome):  -constipation or diarrhea  -headache  This list may not describe all possible side effects. Call your doctor for medical advice about side effects. You may report side effects to FDA at 5-451-FDA-2125.  Where should I keep my medicine?  Keep out of the reach of children.  Store between 2 and 30 degrees C (36 and 86 degrees F). Throw away any unused medicine after the expiration date.  NOTE: This sheet is a summary. It may not cover all possible information. If you have questions about this medicine, talk to your doctor, pharmacist, or health care provider.  © 2018 [employer] (2014-09-24 16:21:52)

## 2018-11-09 ENCOUNTER — HOSPITAL ENCOUNTER (INPATIENT)
Facility: MEDICAL CENTER | Age: 28
LOS: 2 days | DRG: 638 | End: 2018-11-11
Attending: EMERGENCY MEDICINE | Admitting: HOSPITALIST
Payer: MEDICAID

## 2018-11-09 ENCOUNTER — APPOINTMENT (OUTPATIENT)
Dept: RADIOLOGY | Facility: MEDICAL CENTER | Age: 28
DRG: 638 | End: 2018-11-09
Attending: EMERGENCY MEDICINE
Payer: MEDICAID

## 2018-11-09 DIAGNOSIS — E13.10 DIABETIC KETOACIDOSIS WITHOUT COMA ASSOCIATED WITH OTHER SPECIFIED DIABETES MELLITUS (HCC): ICD-10-CM

## 2018-11-09 DIAGNOSIS — E87.5 HYPERKALEMIA: ICD-10-CM

## 2018-11-09 PROBLEM — R10.11 RUQ PAIN: Status: ACTIVE | Noted: 2018-11-09

## 2018-11-09 PROBLEM — E11.10 DKA (DIABETIC KETOACIDOSES): Status: ACTIVE | Noted: 2018-11-09

## 2018-11-09 PROBLEM — R00.0 TACHYCARDIA: Status: ACTIVE | Noted: 2018-11-09

## 2018-11-09 PROBLEM — G62.9 NEUROPATHY: Status: ACTIVE | Noted: 2018-11-09

## 2018-11-09 PROBLEM — E66.9 OBESITY: Status: ACTIVE | Noted: 2018-11-09

## 2018-11-09 LAB
ALBUMIN SERPL BCP-MCNC: 4.7 G/DL (ref 3.2–4.9)
ALBUMIN/GLOB SERPL: 1.6 G/DL
ALP SERPL-CCNC: 169 U/L (ref 30–99)
ALT SERPL-CCNC: 19 U/L (ref 2–50)
ANION GAP SERPL CALC-SCNC: 20 MMOL/L (ref 0–11.9)
AST SERPL-CCNC: 10 U/L (ref 12–45)
BASOPHILS # BLD AUTO: 0.5 % (ref 0–1.8)
BASOPHILS # BLD: 0.04 K/UL (ref 0–0.12)
BILIRUB SERPL-MCNC: 0.7 MG/DL (ref 0.1–1.5)
BUN SERPL-MCNC: 19 MG/DL (ref 8–22)
CA-I SERPL-SCNC: 1.3 MMOL/L (ref 1.1–1.3)
CALCIUM SERPL-MCNC: 9.7 MG/DL (ref 8.5–10.5)
CHLORIDE SERPL-SCNC: 94 MMOL/L (ref 96–112)
CO2 SERPL-SCNC: 12 MMOL/L (ref 20–33)
CREAT SERPL-MCNC: 0.93 MG/DL (ref 0.5–1.4)
D DIMER PPP IA.FEU-MCNC: <0.4 UG/ML (FEU) (ref 0–0.5)
EOSINOPHIL # BLD AUTO: 0.15 K/UL (ref 0–0.51)
EOSINOPHIL NFR BLD: 2 % (ref 0–6.9)
ERYTHROCYTE [DISTWIDTH] IN BLOOD BY AUTOMATED COUNT: 40.5 FL (ref 35.9–50)
GLOBULIN SER CALC-MCNC: 2.9 G/DL (ref 1.9–3.5)
GLUCOSE BLD-MCNC: 460 MG/DL (ref 65–99)
GLUCOSE SERPL-MCNC: 542 MG/DL (ref 65–99)
HCT VFR BLD AUTO: 46.7 % (ref 37–47)
HGB BLD-MCNC: 15.5 G/DL (ref 12–16)
IMM GRANULOCYTES # BLD AUTO: 0.04 K/UL (ref 0–0.11)
IMM GRANULOCYTES NFR BLD AUTO: 0.5 % (ref 0–0.9)
LACTATE BLD-SCNC: 1 MMOL/L (ref 0.5–2)
LIPASE SERPL-CCNC: 16 U/L (ref 11–82)
LYMPHOCYTES # BLD AUTO: 2.02 K/UL (ref 1–4.8)
LYMPHOCYTES NFR BLD: 27.4 % (ref 22–41)
MAGNESIUM SERPL-MCNC: 2.1 MG/DL (ref 1.5–2.5)
MCH RBC QN AUTO: 28.8 PG (ref 27–33)
MCHC RBC AUTO-ENTMCNC: 33.2 G/DL (ref 33.6–35)
MCV RBC AUTO: 86.8 FL (ref 81.4–97.8)
MONOCYTES # BLD AUTO: 0.58 K/UL (ref 0–0.85)
MONOCYTES NFR BLD AUTO: 7.9 % (ref 0–13.4)
NEUTROPHILS # BLD AUTO: 4.55 K/UL (ref 2–7.15)
NEUTROPHILS NFR BLD: 61.7 % (ref 44–72)
NRBC # BLD AUTO: 0 K/UL
NRBC BLD-RTO: 0 /100 WBC
PHOSPHATE SERPL-MCNC: 4 MG/DL (ref 2.5–4.5)
PLATELET # BLD AUTO: 255 K/UL (ref 164–446)
PMV BLD AUTO: 10.8 FL (ref 9–12.9)
POTASSIUM SERPL-SCNC: 5.7 MMOL/L (ref 3.6–5.5)
PROT SERPL-MCNC: 7.6 G/DL (ref 6–8.2)
RBC # BLD AUTO: 5.38 M/UL (ref 4.2–5.4)
SODIUM SERPL-SCNC: 126 MMOL/L (ref 135–145)
WBC # BLD AUTO: 7.4 K/UL (ref 4.8–10.8)

## 2018-11-09 PROCEDURE — 700111 HCHG RX REV CODE 636 W/ 250 OVERRIDE (IP): Performed by: EMERGENCY MEDICINE

## 2018-11-09 PROCEDURE — 99291 CRITICAL CARE FIRST HOUR: CPT | Performed by: INTERNAL MEDICINE

## 2018-11-09 PROCEDURE — 700102 HCHG RX REV CODE 250 W/ 637 OVERRIDE(OP): Performed by: HOSPITALIST

## 2018-11-09 PROCEDURE — 85379 FIBRIN DEGRADATION QUANT: CPT

## 2018-11-09 PROCEDURE — 83690 ASSAY OF LIPASE: CPT

## 2018-11-09 PROCEDURE — 99291 CRITICAL CARE FIRST HOUR: CPT | Performed by: HOSPITALIST

## 2018-11-09 PROCEDURE — 87040 BLOOD CULTURE FOR BACTERIA: CPT

## 2018-11-09 PROCEDURE — 96375 TX/PRO/DX INJ NEW DRUG ADDON: CPT

## 2018-11-09 PROCEDURE — 85025 COMPLETE CBC W/AUTO DIFF WBC: CPT

## 2018-11-09 PROCEDURE — 96374 THER/PROPH/DIAG INJ IV PUSH: CPT

## 2018-11-09 PROCEDURE — 770022 HCHG ROOM/CARE - ICU (200)

## 2018-11-09 PROCEDURE — 93005 ELECTROCARDIOGRAM TRACING: CPT | Performed by: EMERGENCY MEDICINE

## 2018-11-09 PROCEDURE — 84100 ASSAY OF PHOSPHORUS: CPT

## 2018-11-09 PROCEDURE — 93005 ELECTROCARDIOGRAM TRACING: CPT

## 2018-11-09 PROCEDURE — 700105 HCHG RX REV CODE 258: Performed by: EMERGENCY MEDICINE

## 2018-11-09 PROCEDURE — 99291 CRITICAL CARE FIRST HOUR: CPT

## 2018-11-09 PROCEDURE — 76705 ECHO EXAM OF ABDOMEN: CPT

## 2018-11-09 PROCEDURE — 83605 ASSAY OF LACTIC ACID: CPT

## 2018-11-09 PROCEDURE — 80053 COMPREHEN METABOLIC PANEL: CPT

## 2018-11-09 PROCEDURE — 83735 ASSAY OF MAGNESIUM: CPT

## 2018-11-09 PROCEDURE — 82962 GLUCOSE BLOOD TEST: CPT | Mod: 91

## 2018-11-09 PROCEDURE — 82330 ASSAY OF CALCIUM: CPT

## 2018-11-09 RX ORDER — GABAPENTIN 100 MG/1
100 CAPSULE ORAL 2 TIMES DAILY
Status: DISCONTINUED | OUTPATIENT
Start: 2018-11-09 | End: 2018-11-10

## 2018-11-09 RX ORDER — METOCLOPRAMIDE HYDROCHLORIDE 5 MG/ML
10 INJECTION INTRAMUSCULAR; INTRAVENOUS ONCE
Status: COMPLETED | OUTPATIENT
Start: 2018-11-09 | End: 2018-11-09

## 2018-11-09 RX ORDER — SODIUM CHLORIDE, SODIUM LACTATE, POTASSIUM CHLORIDE, AND CALCIUM CHLORIDE .6; .31; .03; .02 G/100ML; G/100ML; G/100ML; G/100ML
2000 INJECTION, SOLUTION INTRAVENOUS ONCE
Status: DISCONTINUED | OUTPATIENT
Start: 2018-11-09 | End: 2018-11-10

## 2018-11-09 RX ORDER — MAGNESIUM SULFATE HEPTAHYDRATE 40 MG/ML
4 INJECTION, SOLUTION INTRAVENOUS
Status: COMPLETED | OUTPATIENT
Start: 2018-11-09 | End: 2018-11-10

## 2018-11-09 RX ORDER — MAGNESIUM SULFATE HEPTAHYDRATE 40 MG/ML
2 INJECTION, SOLUTION INTRAVENOUS
Status: COMPLETED | OUTPATIENT
Start: 2018-11-09 | End: 2018-11-10

## 2018-11-09 RX ORDER — KETOROLAC TROMETHAMINE 30 MG/ML
15 INJECTION, SOLUTION INTRAMUSCULAR; INTRAVENOUS ONCE
Status: COMPLETED | OUTPATIENT
Start: 2018-11-09 | End: 2018-11-09

## 2018-11-09 RX ORDER — OXYCODONE HYDROCHLORIDE 10 MG/1
10 TABLET ORAL
Status: DISCONTINUED | OUTPATIENT
Start: 2018-11-09 | End: 2018-11-10

## 2018-11-09 RX ORDER — FERROUS SULFATE 325(65) MG
325 TABLET ORAL DAILY
Status: DISCONTINUED | OUTPATIENT
Start: 2018-11-10 | End: 2018-11-11 | Stop reason: HOSPADM

## 2018-11-09 RX ORDER — HEPARIN SODIUM 5000 [USP'U]/ML
5000 INJECTION, SOLUTION INTRAVENOUS; SUBCUTANEOUS EVERY 8 HOURS
Status: DISCONTINUED | OUTPATIENT
Start: 2018-11-09 | End: 2018-11-11 | Stop reason: HOSPADM

## 2018-11-09 RX ORDER — POLYETHYLENE GLYCOL 3350 17 G/17G
1 POWDER, FOR SOLUTION ORAL
Status: DISCONTINUED | OUTPATIENT
Start: 2018-11-09 | End: 2018-11-11 | Stop reason: HOSPADM

## 2018-11-09 RX ORDER — AMOXICILLIN 250 MG
2 CAPSULE ORAL 2 TIMES DAILY
Status: DISCONTINUED | OUTPATIENT
Start: 2018-11-09 | End: 2018-11-11 | Stop reason: HOSPADM

## 2018-11-09 RX ORDER — ATORVASTATIN CALCIUM 20 MG/1
20 TABLET, FILM COATED ORAL DAILY
Status: DISCONTINUED | OUTPATIENT
Start: 2018-11-10 | End: 2018-11-11 | Stop reason: HOSPADM

## 2018-11-09 RX ORDER — SODIUM CHLORIDE 9 MG/ML
2000 INJECTION, SOLUTION INTRAVENOUS ONCE
Status: DISCONTINUED | OUTPATIENT
Start: 2018-11-09 | End: 2018-11-10

## 2018-11-09 RX ORDER — SODIUM CHLORIDE, SODIUM LACTATE, POTASSIUM CHLORIDE, CALCIUM CHLORIDE 600; 310; 30; 20 MG/100ML; MG/100ML; MG/100ML; MG/100ML
1000 INJECTION, SOLUTION INTRAVENOUS ONCE
Status: COMPLETED | OUTPATIENT
Start: 2018-11-09 | End: 2018-11-09

## 2018-11-09 RX ORDER — HYDROMORPHONE HYDROCHLORIDE 1 MG/ML
0.5 INJECTION, SOLUTION INTRAMUSCULAR; INTRAVENOUS; SUBCUTANEOUS
Status: DISCONTINUED | OUTPATIENT
Start: 2018-11-09 | End: 2018-11-10

## 2018-11-09 RX ORDER — DEXTROSE AND SODIUM CHLORIDE 5; .9 G/100ML; G/100ML
INJECTION, SOLUTION INTRAVENOUS CONTINUOUS
Status: DISCONTINUED | OUTPATIENT
Start: 2018-11-09 | End: 2018-11-10

## 2018-11-09 RX ORDER — PROMETHAZINE HYDROCHLORIDE 25 MG/1
12.5-25 TABLET ORAL EVERY 4 HOURS PRN
Status: DISCONTINUED | OUTPATIENT
Start: 2018-11-09 | End: 2018-11-11 | Stop reason: HOSPADM

## 2018-11-09 RX ORDER — MORPHINE SULFATE 4 MG/ML
4 INJECTION, SOLUTION INTRAMUSCULAR; INTRAVENOUS ONCE
Status: COMPLETED | OUTPATIENT
Start: 2018-11-09 | End: 2018-11-09

## 2018-11-09 RX ORDER — PROMETHAZINE HYDROCHLORIDE 25 MG/1
12.5-25 SUPPOSITORY RECTAL EVERY 4 HOURS PRN
Status: DISCONTINUED | OUTPATIENT
Start: 2018-11-09 | End: 2018-11-11 | Stop reason: HOSPADM

## 2018-11-09 RX ORDER — DEXTROSE AND SODIUM CHLORIDE 10; .45 G/100ML; G/100ML
INJECTION, SOLUTION INTRAVENOUS CONTINUOUS
Status: ACTIVE | OUTPATIENT
Start: 2018-11-09 | End: 2018-11-10

## 2018-11-09 RX ORDER — BISACODYL 10 MG
10 SUPPOSITORY, RECTAL RECTAL
Status: DISCONTINUED | OUTPATIENT
Start: 2018-11-09 | End: 2018-11-11 | Stop reason: HOSPADM

## 2018-11-09 RX ORDER — SODIUM CHLORIDE 9 MG/ML
1000 INJECTION, SOLUTION INTRAVENOUS ONCE
Status: DISCONTINUED | OUTPATIENT
Start: 2018-11-09 | End: 2018-11-10

## 2018-11-09 RX ORDER — ONDANSETRON 4 MG/1
4 TABLET, ORALLY DISINTEGRATING ORAL EVERY 4 HOURS PRN
Status: DISCONTINUED | OUTPATIENT
Start: 2018-11-09 | End: 2018-11-11 | Stop reason: HOSPADM

## 2018-11-09 RX ORDER — OXYCODONE HYDROCHLORIDE 5 MG/1
5 TABLET ORAL
Status: DISCONTINUED | OUTPATIENT
Start: 2018-11-09 | End: 2018-11-10

## 2018-11-09 RX ORDER — ONDANSETRON 2 MG/ML
4 INJECTION INTRAMUSCULAR; INTRAVENOUS EVERY 4 HOURS PRN
Status: DISCONTINUED | OUTPATIENT
Start: 2018-11-09 | End: 2018-11-11 | Stop reason: HOSPADM

## 2018-11-09 RX ORDER — SODIUM CHLORIDE 9 MG/ML
INJECTION, SOLUTION INTRAVENOUS CONTINUOUS
Status: DISCONTINUED | OUTPATIENT
Start: 2018-11-09 | End: 2018-11-10

## 2018-11-09 RX ADMIN — KETOROLAC TROMETHAMINE 15 MG: 30 INJECTION, SOLUTION INTRAMUSCULAR at 21:30

## 2018-11-09 RX ADMIN — METOCLOPRAMIDE 10 MG: 5 INJECTION, SOLUTION INTRAMUSCULAR; INTRAVENOUS at 21:59

## 2018-11-09 RX ADMIN — MORPHINE SULFATE 4 MG: 4 INJECTION INTRAVENOUS at 22:05

## 2018-11-09 RX ADMIN — SODIUM CHLORIDE, POTASSIUM CHLORIDE, SODIUM LACTATE AND CALCIUM CHLORIDE 1000 ML: 600; 310; 30; 20 INJECTION, SOLUTION INTRAVENOUS at 21:30

## 2018-11-09 ASSESSMENT — ENCOUNTER SYMPTOMS
COUGH: 0
SHORTNESS OF BREATH: 0
SENSORY CHANGE: 0
BLURRED VISION: 0
EYE DISCHARGE: 0
HALLUCINATIONS: 0
FEVER: 0
CHILLS: 0
NAUSEA: 1
HEARTBURN: 0
FLANK PAIN: 0
VOMITING: 0
BRUISES/BLEEDS EASILY: 0
DEPRESSION: 0
WEAKNESS: 0
FOCAL WEAKNESS: 0
HEMOPTYSIS: 0
DIZZINESS: 1
ABDOMINAL PAIN: 1
MYALGIAS: 0
PALPITATIONS: 0
SPEECH CHANGE: 0
DOUBLE VISION: 0

## 2018-11-09 ASSESSMENT — LIFESTYLE VARIABLES
SUBSTANCE_ABUSE: 0
DO YOU DRINK ALCOHOL: NO

## 2018-11-10 ENCOUNTER — APPOINTMENT (OUTPATIENT)
Dept: RADIOLOGY | Facility: MEDICAL CENTER | Age: 28
DRG: 638 | End: 2018-11-10
Attending: EMERGENCY MEDICINE
Payer: MEDICAID

## 2018-11-10 ENCOUNTER — APPOINTMENT (OUTPATIENT)
Dept: RADIOLOGY | Facility: MEDICAL CENTER | Age: 28
DRG: 638 | End: 2018-11-10
Attending: STUDENT IN AN ORGANIZED HEALTH CARE EDUCATION/TRAINING PROGRAM
Payer: MEDICAID

## 2018-11-10 PROBLEM — Z78.9 DIFFICULT INTRAVENOUS ACCESS: Status: ACTIVE | Noted: 2018-11-10

## 2018-11-10 LAB
ALBUMIN SERPL BCP-MCNC: 4 G/DL (ref 3.2–4.9)
ALBUMIN/GLOB SERPL: 1.5 G/DL
ALP SERPL-CCNC: 132 U/L (ref 30–99)
ALT SERPL-CCNC: 15 U/L (ref 2–50)
ANION GAP SERPL CALC-SCNC: 12 MMOL/L (ref 0–11.9)
ANION GAP SERPL CALC-SCNC: 19 MMOL/L (ref 0–11.9)
ANION GAP SERPL CALC-SCNC: 8 MMOL/L (ref 0–11.9)
AST SERPL-CCNC: 9 U/L (ref 12–45)
B-OH-BUTYR SERPL-MCNC: 6.61 MMOL/L (ref 0.02–0.27)
BASOPHILS # BLD AUTO: 0.7 % (ref 0–1.8)
BASOPHILS # BLD: 0.05 K/UL (ref 0–0.12)
BILIRUB SERPL-MCNC: 0.5 MG/DL (ref 0.1–1.5)
BUN SERPL-MCNC: 19 MG/DL (ref 8–22)
BUN SERPL-MCNC: 19 MG/DL (ref 8–22)
BUN SERPL-MCNC: 20 MG/DL (ref 8–22)
CALCIUM SERPL-MCNC: 8.7 MG/DL (ref 8.5–10.5)
CALCIUM SERPL-MCNC: 8.9 MG/DL (ref 8.5–10.5)
CALCIUM SERPL-MCNC: 9.1 MG/DL (ref 8.5–10.5)
CHLORIDE SERPL-SCNC: 101 MMOL/L (ref 96–112)
CHLORIDE SERPL-SCNC: 109 MMOL/L (ref 96–112)
CHLORIDE SERPL-SCNC: 111 MMOL/L (ref 96–112)
CHOLEST SERPL-MCNC: 131 MG/DL (ref 100–199)
CO2 SERPL-SCNC: 11 MMOL/L (ref 20–33)
CO2 SERPL-SCNC: 17 MMOL/L (ref 20–33)
CO2 SERPL-SCNC: 18 MMOL/L (ref 20–33)
CREAT SERPL-MCNC: 0.44 MG/DL (ref 0.5–1.4)
CREAT SERPL-MCNC: 0.51 MG/DL (ref 0.5–1.4)
CREAT SERPL-MCNC: 0.76 MG/DL (ref 0.5–1.4)
EOSINOPHIL # BLD AUTO: 0.14 K/UL (ref 0–0.51)
EOSINOPHIL NFR BLD: 1.9 % (ref 0–6.9)
ERYTHROCYTE [DISTWIDTH] IN BLOOD BY AUTOMATED COUNT: 40.8 FL (ref 35.9–50)
EST. AVERAGE GLUCOSE BLD GHB EST-MCNC: 292 MG/DL
GLOBULIN SER CALC-MCNC: 2.7 G/DL (ref 1.9–3.5)
GLUCOSE BLD-MCNC: 102 MG/DL (ref 65–99)
GLUCOSE BLD-MCNC: 109 MG/DL (ref 65–99)
GLUCOSE BLD-MCNC: 110 MG/DL (ref 65–99)
GLUCOSE BLD-MCNC: 110 MG/DL (ref 65–99)
GLUCOSE BLD-MCNC: 124 MG/DL (ref 65–99)
GLUCOSE BLD-MCNC: 125 MG/DL (ref 65–99)
GLUCOSE BLD-MCNC: 131 MG/DL (ref 65–99)
GLUCOSE BLD-MCNC: 143 MG/DL (ref 65–99)
GLUCOSE BLD-MCNC: 154 MG/DL (ref 65–99)
GLUCOSE BLD-MCNC: 159 MG/DL (ref 65–99)
GLUCOSE BLD-MCNC: 169 MG/DL (ref 65–99)
GLUCOSE BLD-MCNC: 185 MG/DL (ref 65–99)
GLUCOSE BLD-MCNC: 203 MG/DL (ref 65–99)
GLUCOSE BLD-MCNC: 220 MG/DL (ref 65–99)
GLUCOSE BLD-MCNC: 234 MG/DL (ref 65–99)
GLUCOSE BLD-MCNC: 311 MG/DL (ref 65–99)
GLUCOSE BLD-MCNC: 329 MG/DL (ref 65–99)
GLUCOSE BLD-MCNC: 350 MG/DL (ref 65–99)
GLUCOSE BLD-MCNC: 405 MG/DL (ref 65–99)
GLUCOSE SERPL-MCNC: 131 MG/DL (ref 65–99)
GLUCOSE SERPL-MCNC: 159 MG/DL (ref 65–99)
GLUCOSE SERPL-MCNC: 339 MG/DL (ref 65–99)
HBA1C MFR BLD: 11.8 % (ref 0–5.6)
HCT VFR BLD AUTO: 42 % (ref 37–47)
HDLC SERPL-MCNC: 43 MG/DL
HGB BLD-MCNC: 14 G/DL (ref 12–16)
IMM GRANULOCYTES # BLD AUTO: 0.03 K/UL (ref 0–0.11)
IMM GRANULOCYTES NFR BLD AUTO: 0.4 % (ref 0–0.9)
LDLC SERPL CALC-MCNC: 48 MG/DL
LYMPHOCYTES # BLD AUTO: 3 K/UL (ref 1–4.8)
LYMPHOCYTES NFR BLD: 41.5 % (ref 22–41)
MAGNESIUM SERPL-MCNC: 1.8 MG/DL (ref 1.5–2.5)
MAGNESIUM SERPL-MCNC: 2 MG/DL (ref 1.5–2.5)
MCH RBC QN AUTO: 28.7 PG (ref 27–33)
MCHC RBC AUTO-ENTMCNC: 33.3 G/DL (ref 33.6–35)
MCV RBC AUTO: 86.1 FL (ref 81.4–97.8)
MONOCYTES # BLD AUTO: 0.76 K/UL (ref 0–0.85)
MONOCYTES NFR BLD AUTO: 10.5 % (ref 0–13.4)
NEUTROPHILS # BLD AUTO: 3.25 K/UL (ref 2–7.15)
NEUTROPHILS NFR BLD: 45 % (ref 44–72)
NRBC # BLD AUTO: 0 K/UL
NRBC BLD-RTO: 0 /100 WBC
PHOSPHATE SERPL-MCNC: 3.9 MG/DL (ref 2.5–4.5)
PHOSPHATE SERPL-MCNC: 4.1 MG/DL (ref 2.5–4.5)
PLATELET # BLD AUTO: 260 K/UL (ref 164–446)
PMV BLD AUTO: 10.5 FL (ref 9–12.9)
POTASSIUM SERPL-SCNC: 3.9 MMOL/L (ref 3.6–5.5)
POTASSIUM SERPL-SCNC: 4.1 MMOL/L (ref 3.6–5.5)
POTASSIUM SERPL-SCNC: 4.5 MMOL/L (ref 3.6–5.5)
PROT SERPL-MCNC: 6.7 G/DL (ref 6–8.2)
RBC # BLD AUTO: 4.88 M/UL (ref 4.2–5.4)
SODIUM SERPL-SCNC: 131 MMOL/L (ref 135–145)
SODIUM SERPL-SCNC: 137 MMOL/L (ref 135–145)
SODIUM SERPL-SCNC: 138 MMOL/L (ref 135–145)
TRIGL SERPL-MCNC: 198 MG/DL (ref 0–149)
TSH SERPL DL<=0.005 MIU/L-ACNC: 6.25 UIU/ML (ref 0.38–5.33)
WBC # BLD AUTO: 7.2 K/UL (ref 4.8–10.8)

## 2018-11-10 PROCEDURE — 83735 ASSAY OF MAGNESIUM: CPT

## 2018-11-10 PROCEDURE — 84443 ASSAY THYROID STIM HORMONE: CPT

## 2018-11-10 PROCEDURE — 770022 HCHG ROOM/CARE - ICU (200)

## 2018-11-10 PROCEDURE — 99291 CRITICAL CARE FIRST HOUR: CPT | Performed by: INTERNAL MEDICINE

## 2018-11-10 PROCEDURE — 80053 COMPREHEN METABOLIC PANEL: CPT

## 2018-11-10 PROCEDURE — B548ZZA ULTRASONOGRAPHY OF SUPERIOR VENA CAVA, GUIDANCE: ICD-10-PCS | Performed by: INTERNAL MEDICINE

## 2018-11-10 PROCEDURE — A9270 NON-COVERED ITEM OR SERVICE: HCPCS | Performed by: INTERNAL MEDICINE

## 2018-11-10 PROCEDURE — 85025 COMPLETE CBC W/AUTO DIFF WBC: CPT

## 2018-11-10 PROCEDURE — 82010 KETONE BODYS QUAN: CPT

## 2018-11-10 PROCEDURE — 700111 HCHG RX REV CODE 636 W/ 250 OVERRIDE (IP): Performed by: INTERNAL MEDICINE

## 2018-11-10 PROCEDURE — 700111 HCHG RX REV CODE 636 W/ 250 OVERRIDE (IP): Performed by: HOSPITALIST

## 2018-11-10 PROCEDURE — A9270 NON-COVERED ITEM OR SERVICE: HCPCS | Performed by: HOSPITALIST

## 2018-11-10 PROCEDURE — C1751 CATH, INF, PER/CENT/MIDLINE: HCPCS

## 2018-11-10 PROCEDURE — 700105 HCHG RX REV CODE 258: Performed by: HOSPITALIST

## 2018-11-10 PROCEDURE — 36556 INSERT NON-TUNNEL CV CATH: CPT

## 2018-11-10 PROCEDURE — 02HV33Z INSERTION OF INFUSION DEVICE INTO SUPERIOR VENA CAVA, PERCUTANEOUS APPROACH: ICD-10-PCS | Performed by: INTERNAL MEDICINE

## 2018-11-10 PROCEDURE — 700102 HCHG RX REV CODE 250 W/ 637 OVERRIDE(OP): Performed by: HOSPITALIST

## 2018-11-10 PROCEDURE — 82962 GLUCOSE BLOOD TEST: CPT

## 2018-11-10 PROCEDURE — 84100 ASSAY OF PHOSPHORUS: CPT

## 2018-11-10 PROCEDURE — 80048 BASIC METABOLIC PNL TOTAL CA: CPT

## 2018-11-10 PROCEDURE — 700102 HCHG RX REV CODE 250 W/ 637 OVERRIDE(OP): Performed by: INTERNAL MEDICINE

## 2018-11-10 PROCEDURE — 83036 HEMOGLOBIN GLYCOSYLATED A1C: CPT

## 2018-11-10 PROCEDURE — 700105 HCHG RX REV CODE 258: Performed by: INTERNAL MEDICINE

## 2018-11-10 PROCEDURE — 99233 SBSQ HOSP IP/OBS HIGH 50: CPT | Performed by: HOSPITALIST

## 2018-11-10 PROCEDURE — 80061 LIPID PANEL: CPT

## 2018-11-10 PROCEDURE — 71045 X-RAY EXAM CHEST 1 VIEW: CPT

## 2018-11-10 PROCEDURE — 36556 INSERT NON-TUNNEL CV CATH: CPT | Mod: RT,GC | Performed by: INTERNAL MEDICINE

## 2018-11-10 RX ORDER — INSULIN GLARGINE 100 [IU]/ML
30 INJECTION, SOLUTION SUBCUTANEOUS ONCE
Status: COMPLETED | OUTPATIENT
Start: 2018-11-10 | End: 2018-11-10

## 2018-11-10 RX ORDER — INSULIN GLARGINE 100 [IU]/ML
30 INJECTION, SOLUTION SUBCUTANEOUS 2 TIMES DAILY
Status: DISCONTINUED | OUTPATIENT
Start: 2018-11-10 | End: 2018-11-11 | Stop reason: HOSPADM

## 2018-11-10 RX ORDER — POTASSIUM CHLORIDE 14.9 MG/ML
20 INJECTION INTRAVENOUS ONCE
Status: COMPLETED | OUTPATIENT
Start: 2018-11-10 | End: 2018-11-10

## 2018-11-10 RX ORDER — GABAPENTIN 300 MG/1
300 CAPSULE ORAL 3 TIMES DAILY
Status: DISCONTINUED | OUTPATIENT
Start: 2018-11-10 | End: 2018-11-11 | Stop reason: HOSPADM

## 2018-11-10 RX ORDER — ACETAMINOPHEN 325 MG/1
650 TABLET ORAL EVERY 6 HOURS PRN
Status: DISCONTINUED | OUTPATIENT
Start: 2018-11-10 | End: 2018-11-11 | Stop reason: HOSPADM

## 2018-11-10 RX ORDER — SODIUM CHLORIDE, SODIUM LACTATE, POTASSIUM CHLORIDE, CALCIUM CHLORIDE 600; 310; 30; 20 MG/100ML; MG/100ML; MG/100ML; MG/100ML
INJECTION, SOLUTION INTRAVENOUS CONTINUOUS
Status: DISCONTINUED | OUTPATIENT
Start: 2018-11-10 | End: 2018-11-11 | Stop reason: HOSPADM

## 2018-11-10 RX ORDER — METOCLOPRAMIDE HYDROCHLORIDE 5 MG/ML
10 INJECTION INTRAMUSCULAR; INTRAVENOUS EVERY 6 HOURS
Status: DISCONTINUED | OUTPATIENT
Start: 2018-11-10 | End: 2018-11-11 | Stop reason: HOSPADM

## 2018-11-10 RX ORDER — POTASSIUM CHLORIDE 7.45 MG/ML
10 INJECTION INTRAVENOUS ONCE
Status: COMPLETED | OUTPATIENT
Start: 2018-11-10 | End: 2018-11-10

## 2018-11-10 RX ORDER — DEXTROSE MONOHYDRATE 25 G/50ML
25 INJECTION, SOLUTION INTRAVENOUS
Status: DISCONTINUED | OUTPATIENT
Start: 2018-11-10 | End: 2018-11-11 | Stop reason: HOSPADM

## 2018-11-10 RX ADMIN — HEPARIN SODIUM 5000 UNITS: 5000 INJECTION, SOLUTION INTRAVENOUS; SUBCUTANEOUS at 00:08

## 2018-11-10 RX ADMIN — POTASSIUM CHLORIDE 10 MEQ: 7.46 INJECTION, SOLUTION INTRAVENOUS at 07:57

## 2018-11-10 RX ADMIN — INSULIN LISPRO 3 UNITS: 100 INJECTION, SOLUTION INTRAVENOUS; SUBCUTANEOUS at 21:16

## 2018-11-10 RX ADMIN — ACETAMINOPHEN 650 MG: 325 TABLET, FILM COATED ORAL at 21:13

## 2018-11-10 RX ADMIN — POTASSIUM CHLORIDE 20 MEQ: 14.9 INJECTION, SOLUTION INTRAVENOUS at 11:27

## 2018-11-10 RX ADMIN — HEPARIN SODIUM 5000 UNITS: 5000 INJECTION, SOLUTION INTRAVENOUS; SUBCUTANEOUS at 14:11

## 2018-11-10 RX ADMIN — PROCHLORPERAZINE EDISYLATE 10 MG: 5 INJECTION INTRAMUSCULAR; INTRAVENOUS at 10:32

## 2018-11-10 RX ADMIN — POTASSIUM CHLORIDE 20 MEQ: 14.9 INJECTION, SOLUTION INTRAVENOUS at 03:32

## 2018-11-10 RX ADMIN — GABAPENTIN 300 MG: 300 CAPSULE ORAL at 17:47

## 2018-11-10 RX ADMIN — DEXTROSE AND SODIUM CHLORIDE: 5; 900 INJECTION, SOLUTION INTRAVENOUS at 03:13

## 2018-11-10 RX ADMIN — GABAPENTIN 300 MG: 300 CAPSULE ORAL at 10:33

## 2018-11-10 RX ADMIN — INSULIN GLARGINE 30 UNITS: 100 INJECTION, SOLUTION SUBCUTANEOUS at 13:19

## 2018-11-10 RX ADMIN — INSULIN LISPRO 4 UNITS: 100 INJECTION, SOLUTION INTRAVENOUS; SUBCUTANEOUS at 17:58

## 2018-11-10 RX ADMIN — STANDARDIZED SENNA CONCENTRATE AND DOCUSATE SODIUM 2 TABLET: 8.6; 5 TABLET, FILM COATED ORAL at 17:48

## 2018-11-10 RX ADMIN — METOCLOPRAMIDE 10 MG: 5 INJECTION, SOLUTION INTRAMUSCULAR; INTRAVENOUS at 23:12

## 2018-11-10 RX ADMIN — SODIUM CHLORIDE 4 UNITS/HR: 9 INJECTION, SOLUTION INTRAVENOUS at 01:55

## 2018-11-10 RX ADMIN — INSULIN GLARGINE 30 UNITS: 100 INJECTION, SOLUTION SUBCUTANEOUS at 23:13

## 2018-11-10 RX ADMIN — ONDANSETRON 4 MG: 2 INJECTION INTRAMUSCULAR; INTRAVENOUS at 06:16

## 2018-11-10 RX ADMIN — PROCHLORPERAZINE EDISYLATE 10 MG: 5 INJECTION INTRAMUSCULAR; INTRAVENOUS at 03:06

## 2018-11-10 RX ADMIN — SODIUM CHLORIDE, POTASSIUM CHLORIDE, SODIUM LACTATE AND CALCIUM CHLORIDE: 600; 310; 30; 20 INJECTION, SOLUTION INTRAVENOUS at 23:41

## 2018-11-10 RX ADMIN — METOCLOPRAMIDE 10 MG: 5 INJECTION, SOLUTION INTRAMUSCULAR; INTRAVENOUS at 17:48

## 2018-11-10 RX ADMIN — ONDANSETRON 4 MG: 2 INJECTION INTRAMUSCULAR; INTRAVENOUS at 01:53

## 2018-11-10 RX ADMIN — ONDANSETRON 4 MG: 2 INJECTION INTRAMUSCULAR; INTRAVENOUS at 13:11

## 2018-11-10 RX ADMIN — METOCLOPRAMIDE 10 MG: 5 INJECTION, SOLUTION INTRAMUSCULAR; INTRAVENOUS at 10:32

## 2018-11-10 RX ADMIN — SODIUM CHLORIDE, POTASSIUM CHLORIDE, SODIUM LACTATE AND CALCIUM CHLORIDE: 600; 310; 30; 20 INJECTION, SOLUTION INTRAVENOUS at 15:16

## 2018-11-10 RX ADMIN — HEPARIN SODIUM 5000 UNITS: 5000 INJECTION, SOLUTION INTRAVENOUS; SUBCUTANEOUS at 06:16

## 2018-11-10 RX ADMIN — ACETAMINOPHEN 650 MG: 325 TABLET, FILM COATED ORAL at 10:33

## 2018-11-10 RX ADMIN — HEPARIN SODIUM 5000 UNITS: 5000 INJECTION, SOLUTION INTRAVENOUS; SUBCUTANEOUS at 21:12

## 2018-11-10 RX ADMIN — HYDROMORPHONE HYDROCHLORIDE 0.5 MG: 1 INJECTION, SOLUTION INTRAMUSCULAR; INTRAVENOUS; SUBCUTANEOUS at 00:13

## 2018-11-10 RX ADMIN — DEXTROSE AND SODIUM CHLORIDE: 10; .45 INJECTION, SOLUTION INTRAVENOUS at 07:18

## 2018-11-10 RX ADMIN — MAGNESIUM SULFATE 2 G: 2 INJECTION INTRAVENOUS at 11:39

## 2018-11-10 ASSESSMENT — LIFESTYLE VARIABLES
EVER_SMOKED: NEVER
ALCOHOL_USE: NO

## 2018-11-10 ASSESSMENT — COGNITIVE AND FUNCTIONAL STATUS - GENERAL
SUGGESTED CMS G CODE MODIFIER DAILY ACTIVITY: CI
DAILY ACTIVITIY SCORE: 23
SUGGESTED CMS G CODE MODIFIER MOBILITY: CI
MOBILITY SCORE: 23
TOILETING: A LITTLE
CLIMB 3 TO 5 STEPS WITH RAILING: A LITTLE

## 2018-11-10 ASSESSMENT — PAIN SCALES - GENERAL
PAINLEVEL_OUTOF10: 5
PAINLEVEL_OUTOF10: 4
PAINLEVEL_OUTOF10: 5
PAINLEVEL_OUTOF10: 5
PAINLEVEL_OUTOF10: 8
PAINLEVEL_OUTOF10: 8
PAINLEVEL_OUTOF10: 0
PAINLEVEL_OUTOF10: 6
PAINLEVEL_OUTOF10: 4
PAINLEVEL_OUTOF10: 8
PAINLEVEL_OUTOF10: 0
PAINLEVEL_OUTOF10: 5
PAINLEVEL_OUTOF10: 6
PAINLEVEL_OUTOF10: 4
PAINLEVEL_OUTOF10: 6

## 2018-11-10 ASSESSMENT — ENCOUNTER SYMPTOMS
CONSTIPATION: 0
SPEECH DIFFICULTY: 0
HEARTBURN: 0
SPUTUM PRODUCTION: 0
DIZZINESS: 0
AGITATION: 0
WOUND: 0
CHILLS: 0
VOMITING: 1
BRUISES/BLEEDS EASILY: 0
DIARRHEA: 0
VOMITING: 0
DIAPHORESIS: 0
FLANK PAIN: 0
BLURRED VISION: 0
HEADACHES: 0
ABDOMINAL PAIN: 1
SORE THROAT: 0
FATIGUE: 1
NAUSEA: 1
BACK PAIN: 0
SHORTNESS OF BREATH: 0
FEVER: 0
NUMBNESS: 0
DEPRESSION: 0
NECK PAIN: 0
DIZZINESS: 1
PALPITATIONS: 0
COUGH: 0
WEAKNESS: 0
NERVOUS/ANXIOUS: 0

## 2018-11-10 ASSESSMENT — PATIENT HEALTH QUESTIONNAIRE - PHQ9
1. LITTLE INTEREST OR PLEASURE IN DOING THINGS: NOT AT ALL
1. LITTLE INTEREST OR PLEASURE IN DOING THINGS: NOT AT ALL
SUM OF ALL RESPONSES TO PHQ9 QUESTIONS 1 AND 2: 0
2. FEELING DOWN, DEPRESSED, IRRITABLE, OR HOPELESS: NOT AT ALL
SUM OF ALL RESPONSES TO PHQ9 QUESTIONS 1 AND 2: 0
2. FEELING DOWN, DEPRESSED, IRRITABLE, OR HOPELESS: NOT AT ALL

## 2018-11-10 ASSESSMENT — COPD QUESTIONNAIRES
DO YOU EVER COUGH UP ANY MUCUS OR PHLEGM?: NO/ONLY WITH OCCASIONAL COLDS OR INFECTIONS
IN THE PAST 12 MONTHS DO YOU DO LESS THAN YOU USED TO BECAUSE OF YOUR BREATHING PROBLEMS: DISAGREE/UNSURE
DURING THE PAST 4 WEEKS HOW MUCH DID YOU FEEL SHORT OF BREATH: SOME OF THE TIME
HAVE YOU SMOKED AT LEAST 100 CIGARETTES IN YOUR ENTIRE LIFE: NO/DON'T KNOW

## 2018-11-10 NOTE — PROGRESS NOTES
Pt has complaints of 8/10 abdominal pain.  MD Montana at bedside.  MD Montana stated pt does not need the ordered Dilaudid PRN for pain and to not give it.  The ordered Oxy PRN for pain is a PO med and MD aware pt is NPO.  MD aware, no orders given for PRN pain medication.

## 2018-11-10 NOTE — PROCEDURES
Procedure Note    Date: 11/10/2018  Time: 0115    Procedure: Central Venous Line placement  Site: RIJ vein    Indication: Venous Access  Consent: Informed consent obtained from patient or designated decision maker after explaining the benefits/risks of the procedure including but not limited to bleeding, infection, nerve or other deep structure injury, pneumothorax/hemothorax, arrythmia, or death. Patient or surrogate expressed understanding and agreement and signed consent which can be found in the patient's chart.    Procedure: After obtaining consent, a time-out was performed. Appropriate site confirmed with ultrasound and patient positioned, prepped, and draped in sterile fashion. All those present wearing cap and mask and those physically participating remained adhering to sterile fashion with cap, mask, gloves, and gown. 5 mL of local anesthetic injected (1% lidocaine without epinephrine) achieving appropriate comfort level for patient. Vein localized and accessed using continuous ultrasound guidance and a 7 Fr Three Lumen catheter placed using Seldinger technique. Able to aspirate dark, non-pulsatile blood through each lumen and sterile saline flushed easily before capping. Line secured and dressed in sterile fashion. Patient tolerated procedure well without any difficulties and remains in care of bedside nurse. CXR will be performed to confirm appropriate placement for internal jugular or subclavian CVLs.    EBL: minimal  Complications: None  CXR: No PTX    Claudy Juarez  UNJUAN Resident

## 2018-11-10 NOTE — ED NOTES
ICU RN Bashir present for transportation, ICU Tech present to assist. Pt placed on cardiac monitor for transport. All belongings with pt for transportation. Pt alert and oriented, gcs 15.

## 2018-11-10 NOTE — CONSULTS
Critical Care Consultation    Date of consult: 11/9/2018    Referring Physician  No att. providers found    Reason for Consultation  DKA    History of Presenting Illness  28 y.o. female who presented 11/9/2018 with with her mother again complaining of high blood sugars.  She also had some abdominal pain and nausea with one episode of vomiting.  She denied obvious new infectious symptoms.  She denied any new GI symptoms.  She was recently discharged after treatment for DKA and states she has been compliant with all medications and treatment plans.  She feels dehydrated and thirsty.    Patient admitted to the ICU however lost IV access on arrival.  Multiple nurses tried to place Ultrasound Pl., IVs were unsuccessful.  Urgent central line was placed in her right IJ and treatment protocol reinitiated.    Code Status  Full Code    Review of Systems  Review of Systems   Constitutional: Positive for fatigue. Negative for chills, diaphoresis and fever.   HENT: Negative for congestion and sore throat.    Eyes: Negative for visual disturbance.   Respiratory: Negative for cough and shortness of breath.    Cardiovascular: Negative for chest pain and palpitations.   Gastrointestinal: Positive for abdominal pain (Mild), nausea and vomiting. Negative for diarrhea.   Genitourinary: Negative for dysuria, flank pain, hematuria and urgency.   Musculoskeletal: Negative for back pain and neck pain.   Skin: Negative for wound.   Neurological: Positive for dizziness. Negative for speech difficulty, weakness, numbness and headaches.   Psychiatric/Behavioral: Negative for agitation. The patient is not nervous/anxious.        Past Medical History   has a past medical history of Backpain; Bronchitis; Diabetes type 1, controlled ([facility]); DKA (diabetic ketoacidoses) ([facility]); Fall; Gastroparesis; Kidney infection; Pneumonia; and UTI (urinary tract infection).    Surgical History   has a past surgical history that includes gastroscopy-endo  (9/18/2014); gastroscopy with biopsy (9/18/2014); other; submandible abscess incision and drainage (Left, 11/8/2017); dental extraction(s) (11/8/2017); and gastroscopy-endo (N/A, 6/9/2018).    Family History  family history includes Cancer in her unknown relative; Hypertension in her maternal grandfather.    Social History   reports that she has never smoked. She has never used smokeless tobacco. She reports that she does not drink alcohol or use drugs.    Medications  Home Medications     Reviewed by Blade Gunderson (Pharmacy Tech) on 11/09/18 at 2234  Med List Status: Complete   Medication Last Dose Status   atorvastatin (LIPITOR) 20 MG Tab 11/9/2018 Active   Cholecalciferol 2000 UNIT Cap 11/2/2018 Active   ferrous sulfate 325 (65 Fe) MG tablet 11/2/2018 Active   gabapentin (NEURONTIN) 100 MG Cap 11/9/2018 Active   Insulin Glargine (BASAGLAR KWIKPEN) 100 UNIT/ML Solution Pen-injector 11/9/2018 Active   insulin glulisine (APIDRA) 100 UNIT/ML Solution Pen-injector injection 11/9/2018 Active   metoclopramide (REGLAN) 10 MG/10ML Solution 11/2/2018 Active   Omega-3 Fatty Acids (OMEGA 3 PO) 11/9/2018 Active   ondansetron (ZOFRAN ODT) 4 MG TABLET DISPERSIBLE 11/6/2018 Active              Current Facility-Administered Medications   Medication Dose Route Frequency Provider Last Rate Last Dose   • lactated ringers infusion (BOLUS)  2,000 mL Intravenous Once Crys Pierson M.D.        Or   • NS (BOLUS) infusion 2,000 mL  2,000 mL Intravenous Once Crys Pierson M.D.       • dextrose 10% and 0.45% NaCl infusion   Intravenous Continuous Crys Pierson M.D.       • MD ALERT-PHARMACY TO CONSULT FOR DKA MONITORING 1 Each  1 Each Other PRN Crys Pierson M.D.       • magnesium sulfate IVPB premix 2 g  2 g Intravenous Once PRN Crys Pierson M.D.        Or   • magnesium sulfate IVPB premix 4 g  4 g Intravenous Once PRN Crys Pierson M.D.       • potassium phosphates 30 mmol in  mL ivpb  30 mmol  Intravenous Once PRN Crys Pierson M.D.       • Adult DKA potassium(K+) replacement scale  1 Each Intravenous Q4HRS Crys Pierson M.D.       • NS infusion   Intravenous Continuous Crys Pierson M.D.       • D5 NS infusion   Intravenous Continuous Crys Pierson M.D.       • insulin regular (HUMULIN R) injection 8 Units  0.1 Units/kg Intravenous Once Crys Pierson M.D.       • senna-docusate (PERICOLACE or SENOKOT S) 8.6-50 MG per tablet 2 Tab  2 Tab Oral BID Crys Pierson M.D.        And   • polyethylene glycol/lytes (MIRALAX) PACKET 1 Packet  1 Packet Oral QDAY PRN Crys Pierson M.D.        And   • magnesium hydroxide (MILK OF MAGNESIA) suspension 30 mL  30 mL Oral QDAY PRN Crys Pierson M.D.        And   • bisacodyl (DULCOLAX) suppository 10 mg  10 mg Rectal QDAY PRN Crys Pierson M.D.       • heparin injection 5,000 Units  5,000 Units Subcutaneous Q8HRS Crys Pierson M.D.       • ondansetron (ZOFRAN) syringe/vial injection 4 mg  4 mg Intravenous Q4HRS PRN Crys Pierson M.D.       • ondansetron (ZOFRAN ODT) dispertab 4 mg  4 mg Oral Q4HRS PRN Crys Pierson M.D.       • promethazine (PHENERGAN) tablet 12.5-25 mg  12.5-25 mg Oral Q4HRS PRN Crys Pierson M.D.       • promethazine (PHENERGAN) suppository 12.5-25 mg  12.5-25 mg Rectal Q4HRS PRN Crys Pierson M.D.       • prochlorperazine (COMPAZINE) injection 5-10 mg  5-10 mg Intravenous Q4HRS PRN Crys Pierson M.D.       • Pharmacy Consult Request ...Pain Management Review   Other PRN Crys Pierson M.D.        And   • oxyCODONE immediate-release (ROXICODONE) tablet 5 mg  5 mg Oral Q3HRS PRN Crys Pierson M.D.        And   • oxyCODONE immediate-release (ROXICODONE) tablet 10 mg  10 mg Oral Q3HRS PRN Crys Pierson M.D.        And   • HYDROmorphone pf (DILAUDID) injection 0.5 mg  0.5 mg Intravenous Q3HRS PRN Crys Pierson M.D.       • [START ON 11/10/2018] atorvastatin (LIPITOR) tablet 20 mg  20  mg Oral DAILY Crys Pierson M.D.       • [START ON 11/10/2018] ferrous sulfate tablet 325 mg  325 mg Oral DAILY Crys Pierson M.D.       • gabapentin (NEURONTIN) capsule 100 mg  100 mg Oral BID Crys Pierson M.D.       • NS infusion 1,000 mL  1,000 mL Intravenous Once Roque Muñoz D.O.       • insulin regular human (HUMULIN/NOVOLIN R) 62.5 Units in  mL Infusion for DKA  4 Units/hr Intravenous Continuous Crys Pierson M.D.           Allergies  Allergies   Allergen Reactions   • Pcn [Penicillins] Shortness of Breath and Swelling     Per patient's Mom, patient tolerates Keflex   • Lisinopril Unspecified     Per historical: Reported pedal swelling. No facial/angioedema or rash nor respiratory symptoms.    • Promethazine Vomiting       Vital Signs last 24 hours  Temp:  [36.1 °C (97 °F)] 36.1 °C (97 °F)  Pulse:  [104-126] 104  Resp:  [18-19] 18  BP: (107-122)/(60-87) 107/60    Physical Exam  Physical Exam   Constitutional: She is oriented to person, place, and time. She appears well-developed and well-nourished. She appears distressed.   HENT:   Head: Normocephalic and atraumatic.   Eyes: No scleral icterus.   Neck: Neck supple. No JVD present.   Cardiovascular: Regular rhythm and intact distal pulses.  Tachycardia present.  Exam reveals no gallop and no friction rub.    No murmur heard.  Pulmonary/Chest: Tachypnea noted. No respiratory distress. She has no wheezes. She has no rhonchi. She has no rales.   Abdominal: Soft. She exhibits no distension. Bowel sounds are decreased. There is no hepatosplenomegaly. There is generalized tenderness (Very mild and diffuse). There is no rigidity, no rebound, no guarding, no CVA tenderness and negative Urrutia's sign.   Neurological: She is alert and oriented to person, place, and time. She has normal strength. No cranial nerve deficit or sensory deficit. GCS eye subscore is 4. GCS verbal subscore is 5. GCS motor subscore is 6.   Skin: No rash noted. No cyanosis.  Nails show no clubbing.   Psychiatric: Her speech is normal. Her mood appears not anxious. She is not agitated. Cognition and memory are normal.       Fluids  No intake or output data in the 24 hours ending 18 3556    Laboratory  Recent Results (from the past 48 hour(s))   ACCU-CHEK GLUCOSE    Collection Time: 18  7:55 PM   Result Value Ref Range    Glucose - Accu-Ck 460 (HH) 65 - 99 mg/dL   EKG    Collection Time: 18  8:03 PM   Result Value Ref Range    Report       [facility] Emergency Dept.    Test Date:  2018  Pt Name:    AMANDA BRANCH            Department: ER  MRN:        [id number]                      Room:        17  Gender:     Female                       Technician: RN  :        [id number]                   Requested By:ER TRIAGE PROTOCOL  Order #:    [id number]                    Reading MD:    Measurements  Intervals                                Axis  Rate:       110                          P:          48  AZ:         128                          QRS:        17  QRSD:       62                           T:          43  QT:         312  QTc:        423    Interpretive Statements  SINUS TACHYCARDIA  PROBABLE LEFT ATRIAL ABNORMALITY  Compared to ECG 2018 10:07:54  ST (T wave) deviation no longer present     CBC WITH DIFFERENTIAL    Collection Time: 18  9:14 PM   Result Value Ref Range    WBC 7.4 4.8 - 10.8 K/uL    RBC 5.38 4.20 - 5.40 M/uL    Hemoglobin 15.5 12.0 - 16.0 g/dL    Hematocrit 46.7 37.0 - 47.0 %    MCV 86.8 81.4 - 97.8 fL    MCH 28.8 27.0 - 33.0 pg    MCHC 33.2 (L) 33.6 - 35.0 g/dL    RDW 40.5 35.9 - 50.0 fL    Platelet Count 255 164 - 446 K/uL    MPV 10.8 9.0 - 12.9 fL    Neutrophils-Polys 61.70 44.00 - 72.00 %    Lymphocytes 27.40 22.00 - 41.00 %    Monocytes 7.90 0.00 - 13.40 %    Eosinophils 2.00 0.00 - 6.90 %    Basophils 0.50 0.00 - 1.80 %    Immature Granulocytes 0.50 0.00 - 0.90 %    Nucleated RBC 0.00 /100 WBC     Neutrophils (Absolute) 4.55 2.00 - 7.15 K/uL    Lymphs (Absolute) 2.02 1.00 - 4.80 K/uL    Monos (Absolute) 0.58 0.00 - 0.85 K/uL    Eos (Absolute) 0.15 0.00 - 0.51 K/uL    Baso (Absolute) 0.04 0.00 - 0.12 K/uL    Immature Granulocytes (abs) 0.04 0.00 - 0.11 K/uL    NRBC (Absolute) 0.00 K/uL   COMP METABOLIC PANEL    Collection Time: 11/09/18  9:14 PM   Result Value Ref Range    Sodium 126 (L) 135 - 145 mmol/L    Potassium 5.7 (H) 3.6 - 5.5 mmol/L    Chloride 94 (L) 96 - 112 mmol/L    Co2 12 (L) 20 - 33 mmol/L    Anion Gap 20.0 (H) 0.0 - 11.9    Glucose 542 (HH) 65 - 99 mg/dL    Bun 19 8 - 22 mg/dL    Creatinine 0.93 0.50 - 1.40 mg/dL    Calcium 9.7 8.5 - 10.5 mg/dL    AST(SGOT) 10 (L) 12 - 45 U/L    ALT(SGPT) 19 2 - 50 U/L    Alkaline Phosphatase 169 (H) 30 - 99 U/L    Total Bilirubin 0.7 0.1 - 1.5 mg/dL    Albumin 4.7 3.2 - 4.9 g/dL    Total Protein 7.6 6.0 - 8.2 g/dL    Globulin 2.9 1.9 - 3.5 g/dL    A-G Ratio 1.6 g/dL   MAGNESIUM    Collection Time: 11/09/18  9:14 PM   Result Value Ref Range    Magnesium 2.1 1.5 - 2.5 mg/dL   PHOSPHORUS    Collection Time: 11/09/18  9:14 PM   Result Value Ref Range    Phosphorus 4.0 2.5 - 4.5 mg/dL   LACTIC ACID    Collection Time: 11/09/18  9:14 PM   Result Value Ref Range    Lactic Acid 1.0 0.5 - 2.0 mmol/L   LIPASE    Collection Time: 11/09/18  9:14 PM   Result Value Ref Range    Lipase 16 11 - 82 U/L   D-DIMER    Collection Time: 11/09/18  9:14 PM   Result Value Ref Range    D-Dimer Screen <0.40 0.00 - 0.50 ug/mL (FEU)   IONIZED CALCIUM    Collection Time: 11/09/18  9:14 PM   Result Value Ref Range    Ionized Calcium 1.3 1.1 - 1.3 mmol/L   ESTIMATED GFR    Collection Time: 11/09/18  9:14 PM   Result Value Ref Range    GFR If African American >60 >60 mL/min/1.73 m 2    GFR If Non African American >60 >60 mL/min/1.73 m 2       Imaging  DX-CHEST-PORTABLE (1 VIEW)   Final Result         Right central venous catheter with tip projecting over the expected area of the cavoatrial  junction.      US-RUQ   Final Result         1. Redemonstration of echogenic lesion in the right hepatic lobe, measuring up to 4 cm. This is most likely represent hemangioma. The measurement difference is likely technical.      2. No sonographic evidence of acute cholecystitis. No biliary ductal dilatation.         DX-CHEST-2 VIEWS    (Results Pending)         Assessment/Plan  * DKA (diabetic ketoacidoses) ([facility])   Assessment & Plan    Recurrent, suspect noncompliance, encouraged to follow regimen closely  Continue to evaluate for possible infectious or other trigger  DKA protocols  IV access was an issue, emergent central line placed  Fluid resuscitation  Optimize electrolytes  N.p.o. except for ice chips  Diabetic education     Hyperkalemia   Assessment & Plan    In great part due to metabolic acidosis, monitor  Replete as necessary  Supplement replete magnesium and phosphorus as well  DKA protocols       RUQ pain   Assessment & Plan    Secondary to DKA, abdomen benign  Patient afebrile with normal white count  Monitor     Gastroparesis due to DM ([facility])- (present on admission)   Assessment & Plan    Hold Reglan for now, resume when taking p.o.  mobilize  monitor QTC while on Reglan     GERD (gastroesophageal reflux disease)- (present on admission)   Assessment & Plan    Antireflux measures  As needed antacids  Monitor for the need for PPI therapy     Difficult intravenous access   Assessment & Plan    History of difficult access long-term  Lost IV on arrival to ICU  Urgent central line placed for blood draws and administration of fluids electrolytes and insulin, may need extended use of that central line, excellent prep performed, monitor site     Neuropathy ([facility])   Assessment & Plan    Secondary diabetes  Resume Neurontin when clinically appropriate  Long-term tight blood sugar control  Avoid narcotics     Obesity   Assessment & Plan    Monitor weight short and long-term  Behavior modification weight loss to be  encouraged when clinically appropriate     Hyponatremia- (present on admission)   Assessment & Plan    Mostly pseudohyponatremia secondary to hyperglycemia  Serial BMP  Avoid hypotonic fluids         Discussed patient condition and risk of morbidity and/or mortality with Hospitalist, RN, RT, Pharmacy, [employer] [facility] resident, Patient and Charge RN.      The patient remains critically ill.  Critical care time = 40 minutes in directly providing and coordinating critical care and extensive data review.  No time overlap and excludes procedures.

## 2018-11-10 NOTE — PROGRESS NOTES
Pts only IV infiltrated, 2 RN attempt at new IVs and lab draw but unsuccessful. MD paged, still waiting for reply.

## 2018-11-10 NOTE — ASSESSMENT & PLAN NOTE
Complaint of some right upper quadrant discomfort.  Negative Urrutia sign  Mildly elevated alkaline phosphatase noted on CMP normal AST and ALT  GI cocktail as needed for moderate abdominal pain

## 2018-11-10 NOTE — PROGRESS NOTES
Critical Care Progress Note    Date of admission  11/9/2018    Chief Complaint  28 y.o. female admitted 11/9/2018 with DKA    Hospital Course    28 y.o. female who presented 11/9/2018 with with her mother again complaining of high blood sugars.  She also had some abdominal pain and nausea with one episode of vomiting.  She denied obvious new infectious symptoms.  She denied any new GI symptoms.  She was recently discharged after treatment for DKA and states she has been compliant with all medications and treatment plans.  She feels dehydrated and thirsty        Interval Problem Update  Reviewed last 24 hour events:   - AAOx4   - c/o RUQ abd pain (abd US without cholelithiasis)   - NSR, SBP 90s   - insulin gtt @ 2, D10 1/2 NS   - AF   - ongoing N/V   - CVL placed due to difficult access    Review of Systems  Review of Systems   Constitutional: Negative for chills and fever.   HENT: Negative for congestion.    Eyes: Negative for blurred vision.   Respiratory: Negative for sputum production.    Cardiovascular: Negative for chest pain.   Gastrointestinal: Positive for abdominal pain, nausea and vomiting. Negative for constipation.   Genitourinary: Negative for flank pain.   Musculoskeletal: Negative for back pain.   Skin: Negative for rash.   Neurological: Negative for dizziness and headaches.   Endo/Heme/Allergies: Does not bruise/bleed easily.   Psychiatric/Behavioral: Negative for depression. The patient is not nervous/anxious.    All other systems reviewed and are negative.       Vital Signs for last 24 hours   Temp:  [36.1 °C (97 °F)-36.8 °C (98.2 °F)] 36.4 °C (97.5 °F)  Pulse:  [] 95  Resp:  [14-27] 14  BP: (100-122)/(60-87) 100/66    Hemodynamic parameters for last 24 hours       Respiratory   Room air --> 2 lpm n/c    Physical Exam   Physical Exam   Constitutional: She is oriented to person, place, and time. She appears well-developed and well-nourished. No distress.   Chronically ill-appearing   HENT:    Head: Normocephalic and atraumatic.   Nose: Nose normal.   Mouth/Throat: Mucous membranes are dry. No oropharyngeal exudate.   Eyes: Pupils are equal, round, and reactive to light. Conjunctivae are normal. Right eye exhibits no discharge. Left eye exhibits no discharge.   Neck: Neck supple. No JVD present. No tracheal deviation present.   Cardiovascular: Regular rhythm.   Occasional extrasystoles are present. Tachycardia present.  PMI is not displaced.  Exam reveals no distant heart sounds.    No murmur heard.  Pulmonary/Chest: No accessory muscle usage. Tachypnea noted. She has no decreased breath sounds. She has no wheezes. She has rhonchi in the right lower field. She has no rales.   Kussmaul pattern of breathing   Abdominal: Soft. Normal appearance and bowel sounds are normal. There is tenderness in the right upper quadrant. There is no rebound, no guarding and negative Urrutia's sign.   Musculoskeletal: She exhibits no edema or tenderness.   Neurological: She is alert and oriented to person, place, and time. No cranial nerve deficit.   Skin: Skin is warm and dry. No rash noted. There is pallor.   Psychiatric: She has a normal mood and affect. Her behavior is normal. Judgment and thought content normal.       Medications  Current Facility-Administered Medications   Medication Dose Route Frequency Provider Last Rate Last Dose   • potassium chloride (KCL) ivpb 10 mEq  10 mEq Intravenous Once Jalen Boyle M.D. 100 mL/hr at 11/10/18 0757 10 mEq at 11/10/18 0757   • lactated ringers infusion (BOLUS)  2,000 mL Intravenous Once Crys Pierson M.D.   Stopped at 11/09/18 2230    Or   • NS (BOLUS) infusion 2,000 mL  2,000 mL Intravenous Once Crys Pierson M.D.       • dextrose 10% and 0.45% NaCl infusion   Intravenous Continuous Crys Pierson M.D. 100 mL/hr at 11/10/18 0800     • MD ALERT-PHARMACY TO CONSULT FOR DKA MONITORING 1 Each  1 Each Other PRN Crys Pierson M.D.       • magnesium sulfate IVPB  premix 2 g  2 g Intravenous Once PRN Crys Pierson M.D.        Or   • magnesium sulfate IVPB premix 4 g  4 g Intravenous Once PRN Crys Pierson M.D.       • potassium phosphates 30 mmol in  mL ivpb  30 mmol Intravenous Once PRN Crys Pierson M.D.       • Adult DKA potassium(K+) replacement scale  1 Each Intravenous Q4HRS Crys Pierson M.D.   1 Each at 11/10/18 0713   • NS infusion   Intravenous Continuous Crys Pierson M.D.   Stopped at 11/09/18 2230   • D5 NS infusion   Intravenous Continuous Crys Pierson M.D. 150 mL/hr at 11/10/18 0500     • insulin regular (HUMULIN R) injection 8 Units  0.1 Units/kg Intravenous Once Crys Pierson M.D.   Stopped at 11/09/18 2230   • senna-docusate (PERICOLACE or SENOKOT S) 8.6-50 MG per tablet 2 Tab  2 Tab Oral BID Crys Pierson M.D.   Stopped at 11/09/18 2230    And   • polyethylene glycol/lytes (MIRALAX) PACKET 1 Packet  1 Packet Oral QDAY PRN Crys Pierson M.D.        And   • magnesium hydroxide (MILK OF MAGNESIA) suspension 30 mL  30 mL Oral QDAY PRN Crys Pierson M.D.        And   • bisacodyl (DULCOLAX) suppository 10 mg  10 mg Rectal QDAY PRN Crys Pierson M.D.       • heparin injection 5,000 Units  5,000 Units Subcutaneous Q8HRS Crys Pierson M.D.   5,000 Units at 11/10/18 0616   • ondansetron (ZOFRAN) syringe/vial injection 4 mg  4 mg Intravenous Q4HRS PRN Crys Pierson M.D.   4 mg at 11/10/18 0616   • ondansetron (ZOFRAN ODT) dispertab 4 mg  4 mg Oral Q4HRS PRN Crys Pierson M.D.       • promethazine (PHENERGAN) tablet 12.5-25 mg  12.5-25 mg Oral Q4HRS PRN Crys Pierson M.D.       • promethazine (PHENERGAN) suppository 12.5-25 mg  12.5-25 mg Rectal Q4HRS PRN Crys Pierson M.D.       • prochlorperazine (COMPAZINE) injection 5-10 mg  5-10 mg Intravenous Q4HRS PRN Crys Pierson M.D.   10 mg at 11/10/18 0306   • Pharmacy Consult Request ...Pain Management Review   Other PRN Crys Pierson M.D.         And   • oxyCODONE immediate-release (ROXICODONE) tablet 5 mg  5 mg Oral Q3HRS PRN Crys Pierson M.D.        And   • oxyCODONE immediate-release (ROXICODONE) tablet 10 mg  10 mg Oral Q3HRS PRN Crys Pierson M.D.        And   • HYDROmorphone pf (DILAUDID) injection 0.5 mg  0.5 mg Intravenous Q3HRS PRN Crys Pierson M.D.   0.5 mg at 11/10/18 0013   • atorvastatin (LIPITOR) tablet 20 mg  20 mg Oral DAILY Crys Pierson M.D.   Stopped at 11/10/18 0600   • ferrous sulfate tablet 325 mg  325 mg Oral DAILY Crys Pierson M.D.   Stopped at 11/10/18 0600   • gabapentin (NEURONTIN) capsule 100 mg  100 mg Oral BID Crys Pierson M.D.   Stopped at 11/09/18 2230   • NS infusion 1,000 mL  1,000 mL Intravenous Once Roque Muñoz D.O.   Stopped at 11/09/18 2230   • insulin regular human (HUMULIN/NOVOLIN R) 62.5 Units in  mL Infusion for DKA  4 Units/hr Intravenous Continuous Crys Pierson M.D. 8 mL/hr at 11/10/18 0812 2 Units/hr at 11/10/18 0812       Fluids    Intake/Output Summary (Last 24 hours) at 11/10/18 0842  Last data filed at 11/10/18 0812   Gross per 24 hour   Intake          2424.93 ml   Output                0 ml   Net          2424.93 ml       Laboratory          Recent Labs      11/09/18   2114  11/10/18   0145  11/10/18   0605   SODIUM  126*  131*  138   POTASSIUM  5.7*  4.1  4.5   CHLORIDE  94*  101  109   CO2  12*  11*  17*   BUN  19  19  20   CREATININE  0.93  0.76  0.44*   MAGNESIUM  2.1  2.0   --    PHOSPHORUS  4.0  4.1   --    CALCIUM  9.7  9.1  8.9     Recent Labs      11/09/18   2114  11/10/18   0145  11/10/18   0605   ALTSGPT  19  15   --    ASTSGOT  10*  9*   --    ALKPHOSPHAT  169*  132*   --    TBILIRUBIN  0.7  0.5   --    LIPASE  16   --    --    GLUCOSE  542*  339*  159*     Recent Labs      11/09/18 2114  11/10/18   0145   WBC  7.4  7.2   NEUTSPOLYS  61.70  45.00   LYMPHOCYTES  27.40  41.50*   MONOCYTES  7.90  10.50   EOSINOPHILS  2.00  1.90   BASOPHILS  0.50  0.70    ASTSGOT  10*  9*   ALTSGPT  19  15   ALKPHOSPHAT  169*  132*   TBILIRUBIN  0.7  0.5     Recent Labs      11/09/18   2114  11/10/18   0145   RBC  5.38  4.88   HEMOGLOBIN  15.5  14.0   HEMATOCRIT  46.7  42.0   PLATELETCT  255  260       Imaging  X-Ray:  I have personally reviewed the images and compared with prior images. and My impression is: No acute cardiopulmonary process with right IJ central venous catheter 5 cm below the atriocaval junction    Assessment/Plan  * DKA (diabetic ketoacidoses) ([facility])   Assessment & Plan    Recurrent, suspect noncompliance  Continue DKA protocol with insulin drip awaiting resolution of anion gap and improvement in bicarb to greater than 17  Continue IV fluid resuscitation  Monitor glucose and electrolytes and replace closely     Hyperkalemia   Assessment & Plan    Improving with insulin drip  Monitor  Telemetry       RUQ pain   Assessment & Plan    Secondary to DKA, abdomen benign  Monitor, reassurance     Gastroparesis due to DM ([facility])- (present on admission)   Assessment & Plan    Resume Reglan  Monitor QTc interval     GERD (gastroesophageal reflux disease)- (present on admission)   Assessment & Plan    Antireflux measures  As needed antacids  Monitor for the need for PPI therapy  Reglan IV     Tachycardia   Assessment & Plan    Improving  Continue IV fluids     Difficult intravenous access   Assessment & Plan    History of difficult access long-term  Status post central venous catheter placement 11/9     Neuropathy ([facility])   Assessment & Plan    Secondary diabetes  Start gabapentin again     Obesity   Assessment & Plan    Nutrition consultation     Hyponatremia- (present on admission)   Assessment & Plan    Mostly pseudohyponatremia secondary to hyperglycemia  Serial BMP  Avoid hypotonic fluids     Full code    VTE:  Heparin  Ulcer: Not Indicated  Lines: Central Line  Ongoing indication addressed    I have performed a physical exam and reviewed and updated ROS and Plan today  (11/10/2018). In review of yesterday's note (11/9/2018), there are no changes except as documented above.     Patient is critically ill today requiring my active management of her ongoing DKA including titration of insulin drip, optimizing of electrolytes and glucose control. High risk of deterioration and worsening vital organ dysfunction and death without the above critical care interventions.    Discussed patient condition and risk of morbidity and/or mortality with Hospitalist, RN, RT, Pharmacy, Charge nurse / hot rounds and Patient  The patient remains critically ill.  Critical care time = 31 minutes in directly providing and coordinating critical care and extensive data review.  No time overlap and excludes procedures.

## 2018-11-10 NOTE — ED TRIAGE NOTES
Pt c/o of high blood sugar at home, with RUQ pain, dizziness and between the shoulder pain.. Pt recently discharged for DKA,  FS in triage 460.  EKG being performed, charge aware room assigned blue 17

## 2018-11-10 NOTE — ASSESSMENT & PLAN NOTE
Diabetic ketoacidosis protocol initiated on admission  BMPs have been monitored for the patient was on insulin drip and had closure of anion gap and improvement of her CO2.  Patient has been transitioned to Lantus 30 units twice daily and continued on sliding scale insulin.  Insulin drip was discontinued  Patient's been initiated on IV Reglan to help with diabetic gastroparesis and has anti-emetics for as needed nausea  Diabetic diet has been initiated  If the patient is able to hold down foods overnight and doing much better will look toward discharge charge in the morning  There are no other signs to suggest infection as etiology of DKA

## 2018-11-10 NOTE — CARE PLAN
Problem: Bowel/Gastric:  Goal: Normal bowel function is maintained or improved  Outcome: PROGRESSING SLOWER THAN EXPECTED  Patient has not had a BM since admission. Continues to experience nausea and vomiting. Receiving PRNs for nausea IV.   Intervention: Educate patient and significant other/support system about diet, fluid intake, medications and activity to promote bowel function  Done  Intervention: Educate patient and significant other/support system about signs and symptoms of constipation and interventions to implement  Done  Intervention: Collaborate with Interdisciplinary Team for optimal positioning for bowel evacuation  Done. MD restarted reglan, which pt was taking at home.

## 2018-11-10 NOTE — ASSESSMENT & PLAN NOTE
Recurrent, suspect noncompliance  Continue DKA protocol with insulin drip awaiting resolution of anion gap and improvement in bicarb to greater than 17  Continue IV fluid resuscitation  Monitor glucose and electrolytes and replace closely

## 2018-11-10 NOTE — ED NOTES
Pt ambulates to to ED 17. Pt alert and oriented x4, gcs 15, resp even and nl. Pt assisted with changed into gown. Pt on full cardiac monitor. Pt provided warm blanket for comfort. Call light within reach, instructed how to use call light. Bed locked in low position. Chart up for ERP. Will continue to monitor.

## 2018-11-10 NOTE — H&P
Hospital Medicine History & Physical Note    Date of Service  11/9/2018    Primary Care Physician  Navi Huber M.D.    Consultants  none    Code Status  full    Chief Complaint  High blood sugars, RUQ pain    History of Presenting Illness  28 y.o. female who presented 11/9/2018 with elevated blood sugars.  Patient woke up this morning with noted elevated blood sugars.  She has been taking her diabetes medication as prescribed.  She is also had right upper quadrant pain and dizziness yesterday none today.  She is also had some nausea no vomiting.  She denies any diarrhea constipation dysuria.  Recently discharged with DKA, now represents with symptoms of DKA. Will be admited to ICU for DKA management.     Review of Systems  Review of Systems   Constitutional: Negative for chills and fever.   HENT: Negative for congestion, hearing loss and tinnitus.    Eyes: Negative for blurred vision, double vision and discharge.   Respiratory: Negative for cough, hemoptysis and shortness of breath.    Cardiovascular: Negative for chest pain, palpitations and leg swelling.   Gastrointestinal: Positive for abdominal pain and nausea. Negative for heartburn and vomiting.   Genitourinary: Negative for dysuria and flank pain.   Musculoskeletal: Negative for joint pain and myalgias.   Skin: Negative for rash.   Neurological: Positive for dizziness. Negative for sensory change, speech change, focal weakness and weakness.   Endo/Heme/Allergies: Negative for environmental allergies. Does not bruise/bleed easily.   Psychiatric/Behavioral: Negative for depression, hallucinations and substance abuse.       Past Medical History   has a past medical history of Backpain; Bronchitis; Diabetes type 1, controlled ([facility]); DKA (diabetic ketoacidoses) ([facility]); Fall; Gastroparesis; Kidney infection; Pneumonia; and UTI (urinary tract infection).    Surgical History   has a past surgical history that includes gastroscopy-endo (9/18/2014);  gastroscopy with biopsy (9/18/2014); other; submandible abscess incision and drainage (Left, 11/8/2017); dental extraction(s) (11/8/2017); and gastroscopy-endo (N/A, 6/9/2018).     Family History  family history includes Cancer in her unknown relative; Hypertension in her maternal grandfather.     Social History   reports that she has never smoked. She has never used smokeless tobacco. She reports that she does not drink alcohol or use drugs.    Allergies  Allergies   Allergen Reactions   • Pcn [Penicillins] Shortness of Breath and Swelling     Per patient's Mom, patient tolerates Keflex   • Lisinopril Unspecified     Per historical: Reported pedal swelling. No facial/angioedema or rash nor respiratory symptoms.    • Promethazine Vomiting       Medications  Prior to Admission Medications   Prescriptions Last Dose Informant Patient Reported? Taking?   Cholecalciferol 2000 UNIT Cap 11/2/2018 at am Patient Yes No   Sig: Take 1 Cap by mouth every day.   Insulin Glargine (BASAGLAR KWIKPEN) 100 UNIT/ML Solution Pen-injector 11/3/2018 at am Patient Yes No   Sig: Inject 30 Units as instructed 2 Times a Day.   atorvastatin (LIPITOR) 20 MG Tab 11/2/2018 at pm Patient No No   Sig: Take 1 Tab by mouth every day.   ferrous sulfate 325 (65 Fe) MG tablet 11/2/2018 at am Patient Yes No   Sig: Take 325 mg by mouth every day.   gabapentin (NEURONTIN) 100 MG Cap 11/2/2018 at pm Patient No No   Sig: Take 1 Cap by mouth 2 Times a Day.   insulin glulisine (APIDRA) 100 UNIT/ML Solution Pen-injector injection 11/2/2018 at pm Patient Yes No   Sig: Inject 2-10 Units as instructed 3 times a day, with meals. FSBS base 150  For every 50 increase in blood sugar insulin doubles  200 = 2 units  250 = 4 units  300 = 8 units  350 =  16 units  400 = 32 units   metoclopramide (REGLAN) 10 MG/10ML Solution 11/2/2018 at pm Patient Yes No   Sig: Take 10 mg by mouth 3 times a day before meals.   omeprazole (PRILOSEC) 20 MG delayed-release capsule 11/2/2018  at am Patient No No   Sig: Take 1 Cap by mouth every day.   ondansetron (ZOFRAN ODT) 4 MG TABLET DISPERSIBLE   No No   Sig: Take 1 Tab by mouth every 6 hours as needed for Nausea.      Facility-Administered Medications: None       Physical Exam  Temp:  [36.1 °C (97 °F)] 36.1 °C (97 °F)  Pulse:  [120] 120  Resp:  [19] 19  BP: (118)/(85) 118/85    Physical Exam   Constitutional: She is oriented to person, place, and time. She appears well-developed and well-nourished. No distress.   HENT:   Head: Normocephalic and atraumatic.   Dry oral mucosa   Eyes: Pupils are equal, round, and reactive to light. Conjunctivae and EOM are normal.   Neck: Normal range of motion. Neck supple. No JVD present.   Cardiovascular: Regular rhythm, normal heart sounds and intact distal pulses.    No murmur heard.  tachycardic   Pulmonary/Chest: Effort normal and breath sounds normal. No respiratory distress. She has no wheezes.   Abdominal: Soft. Bowel sounds are normal. She exhibits no distension. There is tenderness.   Epigastric ttp    Musculoskeletal: Normal range of motion. She exhibits no edema.   Neurological: She is alert and oriented to person, place, and time. She exhibits normal muscle tone.   Skin: Skin is warm and dry.   Psychiatric: She has a normal mood and affect. Her behavior is normal. Judgment and thought content normal.   Nursing note and vitals reviewed.      Laboratory:  Recent Labs      11/09/18 2114   WBC  7.4   RBC  5.38   HEMOGLOBIN  15.5   HEMATOCRIT  46.7   MCV  86.8   MCH  28.8   MCHC  33.2*   RDW  40.5   PLATELETCT  255   MPV  10.8     Recent Labs      11/09/18 2114   SODIUM  126*   POTASSIUM  5.7*   CHLORIDE  94*   CO2  12*   GLUCOSE  542*   BUN  19   CREATININE  0.93   CALCIUM  9.7     Recent Labs      11/09/18 2114   ALTSGPT  19   ASTSGOT  10*   ALKPHOSPHAT  169*   TBILIRUBIN  0.7   LIPASE  16   GLUCOSE  542*                 No results for input(s): TROPONINI in the last 72 hours.    Urinalysis:    No  results found     Imaging:  US-RUQ   Final Result         1. Redemonstration of echogenic lesion in the right hepatic lobe, measuring up to 4 cm. This is most likely represent hemangioma. The measurement difference is likely technical.      2. No sonographic evidence of acute cholecystitis. No biliary ductal dilatation.         DX-CHEST-2 VIEWS    (Results Pending)         Assessment/Plan:  I anticipate this patient will require at least two midnights for appropriate medical management, necessitating inpatient admission.    * DKA (diabetic ketoacidoses) ([facility])   Assessment & Plan    Presents with DKA, complaint with medications per reports  No infectious etiology, UA pending   Needs IV insulin   Bolus 8 Units insulin   Serial BMP, correct electrolytes as appropriate   Admit to ICU      Tachycardia   Assessment & Plan    Cont IVF and monitor     Neuropathy ([facility])   Assessment & Plan    Due to diabetes continue gabapentin      Obesity   Assessment & Plan    Encouraged weight loss     RUQ pain   Assessment & Plan    Suspect due to DKA   RUQ ultrasound done in ED unremarkable      Hyperkalemia   Assessment & Plan    No EKG changes   continue cardiac monitoring   Suspect improvement with IV insulin and Fluids   Repeat BMP 4 hours     Gastroparesis due to DM ([facility])- (present on admission)   Assessment & Plan    Continue anti emetics      GERD (gastroesophageal reflux disease)- (present on admission)   Assessment & Plan    Hx of currently controlled          VTE prophylaxis: heparin    I spent a total of 33 minutes of critical care time during this clinical encounter of which > 50% was devoted to counseling and coordinating care including review of records, pertinent lab data and studies, as well as discussing diagnostic evaluation and work up, planned therapeutic interventions and future disposition of care. Where indicated, the assessment and plan reflect discussion of patient with consultants, other healthcare providers,  family members, and additional research needed to obtain further information in formulating the plan of care of this patient. This time includes no procedures or overlap with other providers.

## 2018-11-10 NOTE — ED PROVIDER NOTES
ED Provider Note    CHIEF COMPLAINT  Chief Complaint   Patient presents with   • High Blood Sugar   • Dizziness   • RUQ Pain   • Back Pain        [facility]    Primary care provider: Navi Huber M.D.   History obtained from: Patient  History limited by: None     Alis Sparks is a 28 y.o. female who presents to the ED with mother complaining of high glucose levels today that she noticed after waking up this morning.  Patient states that she has been taking her diabetes medications as prescribed.  She has had decreased appetite today but managed to eat because she needed to eat with her medications.  She is reporting right upper quadrant abdominal pain since last night as well as feeling a little dizzy and with headache as well as pain in between her shoulder blades in the back.  She otherwise denies pain anywhere else.  She denies fever but has had chills.  She has had a little bit of a cough.  She reports nausea with one episode of vomiting this morning.  She denies diarrhea/constipation/dysuria/rash.  She denies possibility of pregnancy.  She denies any other symptoms.  Patient was recently discharged from this hospital for DKA.      REVIEW OF SYSTEMS  Please see HPI for pertinent positives/negatives.  All other systems reviewed and are negative.     PAST MEDICAL HISTORY  Past Medical History:   Diagnosis Date   • Backpain    • Bronchitis    • Diabetes type 1, controlled (HCC)     tests 4-5 times daily   • DKA (diabetic ketoacidoses) ([facility])    • Fall    • Gastroparesis    • Kidney infection    • Pneumonia    • UTI (urinary tract infection)         SURGICAL HISTORY  Past Surgical History:   Procedure Laterality Date   • GASTROSCOPY-ENDO N/A 6/9/2018    Procedure: GASTROSCOPY-ENDO WITH BIOPSIES AND DIALATION;  Surgeon: Marino Black M.D.;  Location: SURGERY [facility];  Service: Gastroenterology   • SUBMANDIBLE ABSCESS INCISION AND DRAINAGE Left 11/8/2017    Procedure: SUBMANDIBLE ABSCESS  INCISION AND DRAINAGE;  Surgeon: Osman Young M.D.;  Location: SURGERY [facility];  Service: Dental   • DENTAL EXTRACTION(S)  11/8/2017    Procedure: DENTAL EXTRACTION(S);  Surgeon: Osman Young M.D.;  Location: SURGERY [facility];  Service: Dental   • GASTROSCOPY-ENDO  9/18/2014    Performed by Sylvain PERRY M.D. at ENDOSCOPY [facility]   • GASTROSCOPY WITH BIOPSY  9/18/2014    Performed by Sylvain PERRY M.D. at ENDOSCOPY [facility]   • OTHER      surgery to patch lung after pneumor from central line placement        SOCIAL HISTORY  Social History     Social History Main Topics   • Smoking status: Never Smoker   • Smokeless tobacco: Never Used   • Alcohol use No   • Drug use: No   • Sexual activity: No        FAMILY HISTORY  Family History   Problem Relation Age of Onset   • Cancer Unknown         breasts, multiple family members   • Hypertension Maternal Grandfather         CURRENT MEDICATIONS  Home Medications     Reviewed by Reba Waters PhT (Pharmacy Tech) on 11/09/18 at 2234  Med List Status: Complete   Medication Last Dose Status   atorvastatin (LIPITOR) 20 MG Tab 11/9/2018 Active   Cholecalciferol 2000 UNIT Cap 11/2/2018 Active   ferrous sulfate 325 (65 Fe) MG tablet 11/2/2018 Active   gabapentin (NEURONTIN) 100 MG Cap 11/9/2018 Active   Insulin Glargine (BASAGLAR KWIKPEN) 100 UNIT/ML Solution Pen-injector 11/9/2018 Active   insulin glulisine (APIDRA) 100 UNIT/ML Solution Pen-injector injection 11/9/2018 Active   metoclopramide (REGLAN) 10 MG/10ML Solution 11/2/2018 Active   Omega-3 Fatty Acids (OMEGA 3 PO) 11/9/2018 Active   ondansetron (ZOFRAN ODT) 4 MG TABLET DISPERSIBLE 11/6/2018 Active                 ALLERGIES  Allergies   Allergen Reactions   • Pcn [Penicillins] Shortness of Breath and Swelling     Per patient's Mom, patient tolerates Keflex   • Lisinopril Unspecified     Per historical: Reported pedal swelling. No facial/angioedema or rash nor respiratory symptoms.    •  Promethazine Vomiting        PHYSICAL EXAM  VITAL SIGNS: /60   Pulse (!) 104   Temp 36.1 °C (97 °F)   Resp 18   Wt 82.1 kg (181 lb)   LMP 11/03/2018   SpO2 99%   BMI 30.12 kg/m²  @AMBROSIO[[id number]::@     Pulse ox interpretation: 99% I interpret this pulse ox as normal     Cardiac monitor interpretation: Sinus rhythm with heart rate in the 110s as interpreted by me.  The patient presented with tachycardia and cardiac monitor was ordered to monitor for dysrhythmia.    Constitutional: Well developed, well nourished, alert in no apparent distress, nontoxic appearance    HENT: No external signs of trauma, normocephalic, oropharynx moist and clear, nose normal    Eyes: PERRL, conjunctiva without erythema, no discharge, no icterus    Neck: Soft and supple, trachea midline, no stridor, no tenderness, no LAD, no JVD, good ROM    Cardiovascular: Tachycardia, no murmurs/rubs/gallops, strong distal pulses and good perfusion    Thorax & Lungs: No respiratory distress, CTAB   Abdomen: Soft, mild right upper quadrant tenderness to palpation, nondistended, no guarding, no rebound, normal BS    Back: No CVAT    Extremities: No cyanosis, no edema, no gross deformity, good ROM, no tenderness, intact distal pulses with brisk cap refill    Skin: Warm, dry, no pallor/cyanosis, no rash noted    Lymphatic: No lymphadenopathy noted    Neuro: A/O times 3, no focal deficits noted    Psychiatric: Cooperative, normal mood and affect, normal judgement, appropriate for clinical situation          DIAGNOSTIC STUDIES / PROCEDURES    EKG  12 Lead EKG obtained at 2003 and interpreted by me:   Rate: 110   Rhythm: Sinus tachycardia  Ectopy: None  Intervals: Normal   Axis: Normal    QRS: Normal   ST segments: Normal  T Waves: Normal    Clinical Impression: Sinus tachycardia without acute ischemic changes       LABS  All labs reviewed by me.     Results for orders placed or performed during the hospital encounter of 11/09/18   CBC WITH  DIFFERENTIAL   Result Value Ref Range    WBC 7.4 4.8 - 10.8 K/uL    RBC 5.38 4.20 - 5.40 M/uL    Hemoglobin 15.5 12.0 - 16.0 g/dL    Hematocrit 46.7 37.0 - 47.0 %    MCV 86.8 81.4 - 97.8 fL    MCH 28.8 27.0 - 33.0 pg    MCHC 33.2 (L) 33.6 - 35.0 g/dL    RDW 40.5 35.9 - 50.0 fL    Platelet Count 255 164 - 446 K/uL    MPV 10.8 9.0 - 12.9 fL    Neutrophils-Polys 61.70 44.00 - 72.00 %    Lymphocytes 27.40 22.00 - 41.00 %    Monocytes 7.90 0.00 - 13.40 %    Eosinophils 2.00 0.00 - 6.90 %    Basophils 0.50 0.00 - 1.80 %    Immature Granulocytes 0.50 0.00 - 0.90 %    Nucleated RBC 0.00 /100 WBC    Neutrophils (Absolute) 4.55 2.00 - 7.15 K/uL    Lymphs (Absolute) 2.02 1.00 - 4.80 K/uL    Monos (Absolute) 0.58 0.00 - 0.85 K/uL    Eos (Absolute) 0.15 0.00 - 0.51 K/uL    Baso (Absolute) 0.04 0.00 - 0.12 K/uL    Immature Granulocytes (abs) 0.04 0.00 - 0.11 K/uL    NRBC (Absolute) 0.00 K/uL   COMP METABOLIC PANEL   Result Value Ref Range    Sodium 126 (L) 135 - 145 mmol/L    Potassium 5.7 (H) 3.6 - 5.5 mmol/L    Chloride 94 (L) 96 - 112 mmol/L    Co2 12 (L) 20 - 33 mmol/L    Anion Gap 20.0 (H) 0.0 - 11.9    Glucose 542 (HH) 65 - 99 mg/dL    Bun 19 8 - 22 mg/dL    Creatinine 0.93 0.50 - 1.40 mg/dL    Calcium 9.7 8.5 - 10.5 mg/dL    AST(SGOT) 10 (L) 12 - 45 U/L    ALT(SGPT) 19 2 - 50 U/L    Alkaline Phosphatase 169 (H) 30 - 99 U/L    Total Bilirubin 0.7 0.1 - 1.5 mg/dL    Albumin 4.7 3.2 - 4.9 g/dL    Total Protein 7.6 6.0 - 8.2 g/dL    Globulin 2.9 1.9 - 3.5 g/dL    A-G Ratio 1.6 g/dL   MAGNESIUM   Result Value Ref Range    Magnesium 2.1 1.5 - 2.5 mg/dL   PHOSPHORUS   Result Value Ref Range    Phosphorus 4.0 2.5 - 4.5 mg/dL   LACTIC ACID   Result Value Ref Range    Lactic Acid 1.0 0.5 - 2.0 mmol/L   LIPASE   Result Value Ref Range    Lipase 16 11 - 82 U/L   D-DIMER   Result Value Ref Range    D-Dimer Screen <0.40 0.00 - 0.50 ug/mL (FEU)   IONIZED CALCIUM   Result Value Ref Range    Ionized Calcium 1.3 1.1 - 1.3 mmol/L   ESTIMATED  GFR   Result Value Ref Range    GFR If African American >60 >60 mL/min/1.73 m 2    GFR If Non African American >60 >60 mL/min/1.73 m 2   ACCU-CHEK GLUCOSE   Result Value Ref Range    Glucose - Accu-Ck 460 (HH) 65 - 99 mg/dL   EKG   Result Value Ref Range    Report       [facility] Emergency Dept.    Test Date:  2018  Pt Name:    AMANDA BRANCH            Department: ER  MRN:        [id number]                      Room:        17  Gender:     Female                       Technician: RN  :        [id number]                   Requested By:ER TRIAGE PROTOCOL  Order #:    [id number]                    Reading MD:    Measurements  Intervals                                Axis  Rate:       110                          P:          48  SC:         128                          QRS:        17  QRSD:       62                           T:          43  QT:         312  QTc:        423    Interpretive Statements  SINUS TACHYCARDIA  PROBABLE LEFT ATRIAL ABNORMALITY  Compared to ECG 2018 10:07:54  ST (T wave) deviation no longer present          RADIOLOGY  The radiologist's interpretation of all radiological studies have been reviewed by me.     US-RUQ   Final Result         1. Redemonstration of echogenic lesion in the right hepatic lobe, measuring up to 4 cm. This is most likely represent hemangioma. The measurement difference is likely technical.      2. No sonographic evidence of acute cholecystitis. No biliary ductal dilatation.         DX-CHEST-2 VIEWS    (Results Pending)          COURSE & MEDICAL DECISION MAKING  Nursing notes, VS, PMSFHx reviewed in chart.     Review of past medical records shows the patient was admitted on November 3, 2018 for DKA and nausea and vomiting and discharged home on 2018.      Differential diagnoses considered include but are not limited to: DKA, hyperglycemia, electrolyte abnormality, dehydration      2200: D/W Dr. Pierson, hospitalist, who will  admit patient      History and physical exam as above.  Patient with history of several past similar visits for DKA.  Her initial glucose read high and IV fluid was initiated due to high suspicion for DKA.  Patient was given Reglan for her nausea with subsequent improvement.  She initially received Toradol for pain without much improvement and subsequently was given morphine.  Labs again confirmed DKA.  Findings discussed with patient and her mother and they are agreeable to admission.  Unknown cause for her repeated DKA.  Mother states that she monitors patient's medication and she has been taking them as prescribed.  Discussed with Dr. Pierson who graciously agreed to admit patient for further care.      HYDRATION: Based on the patient's presentation of DKA the patient was given IV fluids. IV Hydration was used because oral hydration was not as rapid as required. Upon recheck following hydration, the patient was Unchanged.      CRITICAL CARE NOTE:  Total critical care time spent on this patient was 30 minutes exclusive of separately billable procedures.  This may include direct bedside patient care, speaking with family members, review of past medical records, reviewing the results of laboratory/diagnostic studies, consulting with other physicians, as well as evaluating the response to the therapy instituted.        FINAL IMPRESSION  1. Diabetic ketoacidosis without coma associated with other specified diabetes mellitus (HCC) Acute   2. Hyperkalemia Active          DISPOSITION  Patient will be admitted to the ICU in guarded condition by hospitalist      Electronically signed by: Roque Muñoz, 11/9/2018 8:29 PM      Portions of this record were made with voice recognition software.  Despite my review, spelling/grammar/context errors may still remain.  Interpretation of this chart should be taken in this context.

## 2018-11-10 NOTE — ED NOTES
Pt ambulated to ED 17 with steady gait. Pt alert and oriented x4, gcs 15, resp even and nl. Pt assisted with changed into gown. Pt on full cardiac monitor. Pt provided warm blanket for comfort. Call light within reach, instructed how to use call light. Bed locked in low position. Chart up for ERP. Will continue to monitor.

## 2018-11-10 NOTE — ASSESSMENT & PLAN NOTE
History of difficult access long-term  Status post central venous catheter placement 11/9 --> remove at time of discharge

## 2018-11-10 NOTE — ASSESSMENT & PLAN NOTE
Suspect due to DKA   RUQ ultrasound done in ED unremarkable   GI cocktail added will also have as needed acetaminophen  Avoid narcotics

## 2018-11-10 NOTE — PROGRESS NOTES
[facility]    Date of Service: 11/10/2018    Chief Complaint  28 y.o. female admitted 2018 with DKA    Interval Problem Update  Oriented x4  Abdominal discomfort  Remains on insulin drip this am.  NPO  Ongoing nausea  On 2L NC  Start IV reglan    Consultants/Specialty  Intensivist    Disposition  Monitor in ICU        Review of Systems   Constitutional: Negative for chills and fever.   HENT: Negative for congestion and sore throat.    Respiratory: Negative for cough and shortness of breath.    Cardiovascular: Negative for palpitations and leg swelling.   Gastrointestinal: Positive for abdominal pain (RUQ) and nausea. Negative for diarrhea, heartburn and vomiting.   Genitourinary: Negative for dysuria, frequency and hematuria.   Musculoskeletal: Negative for back pain and neck pain.   Neurological: Positive for dizziness. Negative for headaches.   Psychiatric/Behavioral: The patient is not nervous/anxious.       Physical Exam  Laboratory/Imaging   Hemodynamics  Temp (24hrs), Av.4 °C (97.5 °F), Min:36.1 °C (97 °F), Max:36.8 °C (98.2 °F)   Temperature: 36.7 °C (98 °F)  Pulse  Av.7  Min: 79  Max: 135 Heart Rate (Monitored): 90  Blood Pressure: 100/66, NIBP: 104/67      Respiratory      Respiration: 17, Pulse Oximetry: 96 %             Fluids    Intake/Output Summary (Last 24 hours) at 11/10/18 1759  Last data filed at 11/10/18 1600   Gross per 24 hour   Intake             3606 ml   Output             1200 ml   Net             2406 ml       Nutrition  Orders Placed This Encounter   Procedures   • Diet Order Diabetic     Standing Status:   Standing     Number of Occurrences:   1     Order Specific Question:   Diet:     Answer:   Diabetic [3]     Physical Exam   Constitutional: She is oriented to person, place, and time. No distress.   Overweight   HENT:   Head: Normocephalic and atraumatic.   Nose: Nose normal.   Mouth/Throat: No oropharyngeal exudate.   Eyes: Conjunctivae are normal.  Right eye exhibits no discharge. Left eye exhibits no discharge.   Neck: Neck supple. No tracheal deviation present.   Cardiovascular: Normal rate, regular rhythm, normal heart sounds and intact distal pulses.    No murmur heard.  Pulses:       Radial pulses are 2+ on the right side, and 2+ on the left side.        Dorsalis pedis pulses are 2+ on the right side, and 2+ on the left side.   Pulmonary/Chest: Effort normal and breath sounds normal. No stridor. No respiratory distress. She has no wheezes. She has no rales.   Abdominal: Soft. Bowel sounds are normal. She exhibits no distension. There is no tenderness.   Musculoskeletal: She exhibits no edema.   Neurological: She is alert and oriented to person, place, and time. No cranial nerve deficit.   Skin: Skin is warm. She is not diaphoretic. No erythema.   Psychiatric: She has a normal mood and affect. Her behavior is normal.   Vitals reviewed.      Recent Labs      11/09/18   2114  11/10/18   0145   WBC  7.4  7.2   RBC  5.38  4.88   HEMOGLOBIN  15.5  14.0   HEMATOCRIT  46.7  42.0   MCV  86.8  86.1   MCH  28.8  28.7   MCHC  33.2*  33.3*   RDW  40.5  40.8   PLATELETCT  255  260   MPV  10.8  10.5     Recent Labs      11/10/18   0145  11/10/18   0605  11/10/18   0955   SODIUM  131*  138  137   POTASSIUM  4.1  4.5  3.9   CHLORIDE  101  109  111   CO2  11*  17*  18*   GLUCOSE  339*  159*  131*   BUN  19  20  19   CREATININE  0.76  0.44*  0.51   CALCIUM  9.1  8.9  8.7             Recent Labs      11/10/18   0145   TRIGLYCERIDE  198*   HDL  43   LDL  48          Assessment/Plan     * DKA (diabetic ketoacidoses) ([facility])   Assessment & Plan    Diabetic ketoacidosis protocol initiated on admission  BMPs have been monitored for the patient was on insulin drip and had closure of anion gap and improvement of her CO2.  Patient has been transitioned to Lantus 30 units twice daily and continued on sliding scale insulin.  Insulin drip was discontinued  Patient's been initiated on IV  Reglan to help with diabetic gastroparesis and has anti-emetics for as needed nausea  Diabetic diet has been initiated  If the patient is able to hold down foods overnight and doing much better will look toward discharge charge in the morning  There are no other signs to suggest infection as etiology of DKA     Hyperkalemia   Assessment & Plan    Improved with fluids and treatment of hyperglycemia/DKA  Monitor a.m. BMP     RUQ pain   Assessment & Plan    Suspect due to DKA   RUQ ultrasound done in ED unremarkable   GI cocktail added will also have as needed acetaminophen  Avoid narcotics     Gastroparesis due to DM ([facility])- (present on admission)   Assessment & Plan    Continue as needed anti emetics   IV Reglan       GERD (gastroesophageal reflux disease)- (present on admission)   Assessment & Plan    Complaint of some right upper quadrant discomfort.  Negative Urrutia sign  Mildly elevated alkaline phosphatase noted on CMP normal AST and ALT  GI cocktail as needed for moderate abdominal pain     Tachycardia   Assessment & Plan    Tachycardia has improved with IV fluids and treatment of DKA  Continuing to monitor vitals     Neuropathy ([facility])   Assessment & Plan    Continue outpatient treatment with gabapentin 300 mg 3 times daily     Obesity   Assessment & Plan    Encouraged weight loss     Hyponatremia- (present on admission)   Assessment & Plan    Likely component of pseudohyponatremia from DKA improved with IV fluids and treatment of hyperglycemia/DKA       Quality-Core Measures   Reviewed items::  Labs reviewed, Medications reviewed and Radiology images reviewed  Pitt catheter::  No Pitt  Central line in place:  Need for access  DVT prophylaxis pharmacological::  Heparin

## 2018-11-10 NOTE — CARE PLAN
Problem: Bowel/Gastric:  Goal: Will not experience complications related to bowel motility  Outcome: PROGRESSING SLOWER THAN EXPECTED  Pt has not had BM   Intervention: Assess baseline bowel pattern  done  Intervention: Implement interventions to promote bowel evacuation if inadequate bowel movements in past 48 hours  PT has not yet been here 48 hours  Intervention: Implement Bowel Protocol, if applicable  n/a      Problem: Pain Management  Goal: Pain level will decrease to patient's comfort goal  Outcome: PROGRESSING SLOWER THAN EXPECTED    Intervention: Follow pain managment plan developed in collaboration with patient and Interdisciplinary Team  Done

## 2018-11-11 VITALS
HEIGHT: 65 IN | WEIGHT: 183.64 LBS | TEMPERATURE: 97.7 F | SYSTOLIC BLOOD PRESSURE: 100 MMHG | HEART RATE: 97 BPM | DIASTOLIC BLOOD PRESSURE: 66 MMHG | OXYGEN SATURATION: 95 % | RESPIRATION RATE: 25 BRPM | BODY MASS INDEX: 30.6 KG/M2

## 2018-11-11 PROBLEM — E11.10 DKA (DIABETIC KETOACIDOSES): Status: RESOLVED | Noted: 2018-11-09 | Resolved: 2018-11-11

## 2018-11-11 PROBLEM — E87.5 HYPERKALEMIA: Status: RESOLVED | Noted: 2018-11-09 | Resolved: 2018-11-11

## 2018-11-11 PROBLEM — R00.0 TACHYCARDIA: Status: RESOLVED | Noted: 2018-11-09 | Resolved: 2018-11-11

## 2018-11-11 PROBLEM — R10.11 RUQ PAIN: Status: RESOLVED | Noted: 2018-11-09 | Resolved: 2018-11-11

## 2018-11-11 LAB
ANION GAP SERPL CALC-SCNC: 8 MMOL/L (ref 0–11.9)
BUN SERPL-MCNC: 12 MG/DL (ref 8–22)
CALCIUM SERPL-MCNC: 8.5 MG/DL (ref 8.5–10.5)
CHLORIDE SERPL-SCNC: 110 MMOL/L (ref 96–112)
CO2 SERPL-SCNC: 22 MMOL/L (ref 20–33)
CREAT SERPL-MCNC: 0.53 MG/DL (ref 0.5–1.4)
GLUCOSE BLD-MCNC: 121 MG/DL (ref 65–99)
GLUCOSE BLD-MCNC: 124 MG/DL (ref 65–99)
GLUCOSE BLD-MCNC: 148 MG/DL (ref 65–99)
GLUCOSE BLD-MCNC: 174 MG/DL (ref 65–99)
GLUCOSE BLD-MCNC: 66 MG/DL (ref 65–99)
GLUCOSE BLD-MCNC: 70 MG/DL (ref 65–99)
GLUCOSE BLD-MCNC: 95 MG/DL (ref 65–99)
GLUCOSE SERPL-MCNC: 71 MG/DL (ref 65–99)
POTASSIUM SERPL-SCNC: 3.7 MMOL/L (ref 3.6–5.5)
SODIUM SERPL-SCNC: 140 MMOL/L (ref 135–145)
T4 FREE SERPL-MCNC: 0.83 NG/DL (ref 0.53–1.43)

## 2018-11-11 PROCEDURE — 700102 HCHG RX REV CODE 250 W/ 637 OVERRIDE(OP): Performed by: INTERNAL MEDICINE

## 2018-11-11 PROCEDURE — 80048 BASIC METABOLIC PNL TOTAL CA: CPT

## 2018-11-11 PROCEDURE — 99233 SBSQ HOSP IP/OBS HIGH 50: CPT | Performed by: INTERNAL MEDICINE

## 2018-11-11 PROCEDURE — 84439 ASSAY OF FREE THYROXINE: CPT

## 2018-11-11 PROCEDURE — 700111 HCHG RX REV CODE 636 W/ 250 OVERRIDE (IP): Performed by: INTERNAL MEDICINE

## 2018-11-11 PROCEDURE — 99238 HOSP IP/OBS DSCHRG MGMT 30/<: CPT | Performed by: HOSPITALIST

## 2018-11-11 PROCEDURE — A9270 NON-COVERED ITEM OR SERVICE: HCPCS | Performed by: HOSPITALIST

## 2018-11-11 PROCEDURE — 82962 GLUCOSE BLOOD TEST: CPT | Mod: 91

## 2018-11-11 PROCEDURE — A9270 NON-COVERED ITEM OR SERVICE: HCPCS | Performed by: INTERNAL MEDICINE

## 2018-11-11 PROCEDURE — 700111 HCHG RX REV CODE 636 W/ 250 OVERRIDE (IP): Performed by: HOSPITALIST

## 2018-11-11 PROCEDURE — 700105 HCHG RX REV CODE 258: Performed by: INTERNAL MEDICINE

## 2018-11-11 PROCEDURE — 700102 HCHG RX REV CODE 250 W/ 637 OVERRIDE(OP): Performed by: HOSPITALIST

## 2018-11-11 RX ADMIN — HEPARIN SODIUM 5000 UNITS: 5000 INJECTION, SOLUTION INTRAVENOUS; SUBCUTANEOUS at 06:00

## 2018-11-11 RX ADMIN — ATORVASTATIN CALCIUM 20 MG: 20 TABLET, FILM COATED ORAL at 06:09

## 2018-11-11 RX ADMIN — SODIUM CHLORIDE, POTASSIUM CHLORIDE, SODIUM LACTATE AND CALCIUM CHLORIDE: 600; 310; 30; 20 INJECTION, SOLUTION INTRAVENOUS at 09:57

## 2018-11-11 RX ADMIN — METOCLOPRAMIDE 10 MG: 5 INJECTION, SOLUTION INTRAMUSCULAR; INTRAVENOUS at 06:09

## 2018-11-11 RX ADMIN — INSULIN GLARGINE 30 UNITS: 100 INJECTION, SOLUTION SUBCUTANEOUS at 10:24

## 2018-11-11 RX ADMIN — FERROUS SULFATE TAB 325 MG (65 MG ELEMENTAL FE) 325 MG: 325 (65 FE) TAB at 06:09

## 2018-11-11 RX ADMIN — Medication 16 G: at 06:21

## 2018-11-11 RX ADMIN — GABAPENTIN 300 MG: 300 CAPSULE ORAL at 06:09

## 2018-11-11 ASSESSMENT — ENCOUNTER SYMPTOMS
CHILLS: 0
SENSORY CHANGE: 0
ABDOMINAL PAIN: 0
DEPRESSION: 0
SPUTUM PRODUCTION: 0
PHOTOPHOBIA: 0
POLYDIPSIA: 0
VOMITING: 0
BACK PAIN: 0
SHORTNESS OF BREATH: 0
WEAKNESS: 0
NAUSEA: 0

## 2018-11-11 ASSESSMENT — PAIN SCALES - GENERAL
PAINLEVEL_OUTOF10: 8
PAINLEVEL_OUTOF10: 0
PAINLEVEL_OUTOF10: 8
PAINLEVEL_OUTOF10: 8

## 2018-11-11 NOTE — CARE PLAN
Problem: Venous Thromboembolism (VTW)/Deep Vein Thrombosis (DVT) Prevention:  Goal: Patient will participate in Venous Thrombosis (VTE)/Deep Vein Thrombosis (DVT)Prevention Measures  Outcome: PROGRESSING AS EXPECTED    Intervention: Assess and monitor for anticoagulation complications  Completed and ongoing   Intervention: Ensure patient wears graduated elastic stockings (KING hose) and/or SCDs, if ordered, when in bed or chair (Remove at least once per shift for skin check)  verified  Intervention: Encourage patient to perform ankle flex, foot rotation, and knee flex exercises in addition to other prophylatic measures every hour while awake  Completed       Problem: Pain Management  Goal: Pain level will decrease to patient's comfort goal  Outcome: PROGRESSING SLOWER THAN EXPECTED  AEB pt report of pain rating of less than 2  Intervention: Follow pain managment plan developed in collaboration with patient and Interdisciplinary Team  Completed and ongoing

## 2018-11-11 NOTE — PROGRESS NOTES
0600 BG of 66; juice and crackers given  0615 BG 70; 16g of glucose tablets given  0630 BG 95; 16 g of glucose; Peanut butter and crackers given  0645     0630 Pharm D notified of blood sugar

## 2018-11-11 NOTE — ASSESSMENT & PLAN NOTE
Likely component of pseudohyponatremia from DKA improved with IV fluids and treatment of hyperglycemia/DKA

## 2018-11-11 NOTE — CARE PLAN
Problem: Communication  Goal: The ability to communicate needs accurately and effectively will improve  Outcome: MET Date Met: 11/11/18  Patient communicating needs to hospital staff.    Problem: Safety  Goal: Will remain free from injury  Outcome: MET Date Met: 11/11/18  Patient has remained free of injury during stay  Goal: Will remain free from falls  Outcome: MET Date Met: 11/11/18  Patient has remained free from falls during stay    Problem: Infection  Goal: Will remain free from infection  Outcome: MET Date Met: 11/11/18  Patient has remained free of infection    Problem: Venous Thromboembolism (VTW)/Deep Vein Thrombosis (DVT) Prevention:  Goal: Patient will participate in Venous Thrombosis (VTE)/Deep Vein Thrombosis (DVT)Prevention Measures  Outcome: MET Date Met: 11/11/18  SQ Heparin while inpatient and SCDs    Problem: Bowel/Gastric:  Goal: Normal bowel function is maintained or improved  Outcome: MET Date Met: 11/11/18    Goal: Will not experience complications related to bowel motility  Outcome: MET Date Met: 11/11/18      Problem: Knowledge Deficit  Goal: Knowledge of disease process/condition, treatment plan, diagnostic tests, and medications will improve  Outcome: MET Date Met: 11/11/18  Patient states she understands discharge instructions and education  Goal: Knowledge of the prescribed therapeutic regimen will improve  Outcome: PROGRESSING AS EXPECTED  Patient verbalized understanding    Problem: Discharge Barriers/Planning  Goal: Patient's continuum of care needs will be met  Outcome: MET Date Met: 11/11/18      Problem: Fluid Volume:  Goal: Will maintain balanced intake and output  Outcome: MET Date Met: 11/11/18      Problem: Pain Management  Goal: Pain level will decrease to patient's comfort goal  Outcome: MET Date Met: 11/11/18  Patient reports decreased abdominal pain

## 2018-11-11 NOTE — PROGRESS NOTES
"0745 [facility] - 174    0855- Discussed hypoglycemia overnight with Pharmacist. Plan to hold Lantus this morning and will discuss plan during rounds.    0940- [facility] before meal 148, no coverage needed     Lantus coverage discussed during rounds. Per Dr. Gonda okay to give Lantus. See MAR.     Orders received for discharge during rounds.     Patient's right IJ central line removed by this RN. Patient placed in upright, sitting position, instructed to hold breath and line removed. x2 sutures removed. Pressure applied and pressure dressing applied. Instructed patient to leave dressing on for 24 hours.     Discharge instructions reviewed with patient and her mother at bedside with this RN. Reinforced Diabetic diet (patient gave examples and states she \"eats healthy at home\" and she has been taking her medication as ordered. When asked patient states she takes 30units Lantus BID and takes Lispro with meals. Patient was able to state symptoms of DKA. States her sugars are usually <200 at home. [employer] handouts provided as well regarding DKA, diabetic diet and Type 1 Diabetes. Instructed patient to follow up with her primary care provider (patient states she will call him Tuesday as the office is closed on Monday). Patient verbalized understanding of discharge instructions.     AVS printed and signed by patient. Wrist band removed. Patient left facility walking with her mother (refused transport). Encouraged patient to call if questions arise.     Mayi Calloway RN 11/11/18 12:54 PM       "

## 2018-11-11 NOTE — PROGRESS NOTES
Critical Care Progress Note    Date of admission  11/9/2018    Chief Complaint  28 y.o. female admitted 11/9/2018 with DKA    Hospital Course    28 y.o. female who presented 11/9/2018 with with her mother again complaining of high blood sugars.  She also had some abdominal pain and nausea with one episode of vomiting.  She denied obvious new infectious symptoms.  She denied any new GI symptoms.  She was recently discharged after treatment for DKA and states she has been compliant with all medications and treatment plans.  She feels dehydrated and thirsty        Interval Problem Update  Reviewed last 24 hour events:   - transitioned off insulin gtt at 1500 yesterday   - ongoing abd pain improving with APAP   - SBP , NSR   - low K   - AG remains closed and bicarb 22, hypoglycemic this morning   - d/c home    Review of Systems  Review of Systems   Constitutional: Negative for chills.   HENT: Negative for nosebleeds.    Eyes: Negative for photophobia.   Respiratory: Negative for sputum production and shortness of breath.    Cardiovascular: Negative for leg swelling.   Gastrointestinal: Negative for abdominal pain, nausea and vomiting.   Genitourinary: Negative for dysuria.   Musculoskeletal: Negative for back pain.   Neurological: Negative for sensory change and weakness.   Endo/Heme/Allergies: Negative for polydipsia.   Psychiatric/Behavioral: Negative for depression.   All other systems reviewed and are negative.       Vital Signs for last 24 hours   Temp:  [35.9 °C (96.6 °F)-37.5 °C (99.5 °F)] 36.5 °C (97.7 °F)  Pulse:  [] 79  Resp:  [12-41] 16    Hemodynamic parameters for last 24 hours       Respiratory   Room air     Physical Exam   Physical Exam   Constitutional: She is oriented to person, place, and time. She appears well-developed and well-nourished. No distress.   Much more energetic appearing today, wanting to go home, improved color   HENT:   Head: Normocephalic and atraumatic.   Nose: Nose  normal.   Mouth/Throat: Oropharynx is clear and moist. Mucous membranes are not dry.   Eyes: Pupils are equal, round, and reactive to light. Conjunctivae and EOM are normal.   Neck: Neck supple. No thyromegaly present.   Cardiovascular: Normal rate, regular rhythm, normal heart sounds and intact distal pulses.   No extrasystoles are present. PMI is not displaced.  Exam reveals no distant heart sounds.    No murmur heard.  Pulmonary/Chest: No accessory muscle usage. No tachypnea. She has no decreased breath sounds. She has no wheezes. She has no rhonchi. She has no rales.   Abdominal: Soft. Normal appearance and bowel sounds are normal. She exhibits no distension. There is no tenderness. There is no rebound, no guarding and negative Urrutia's sign.   Musculoskeletal: She exhibits no edema, tenderness or deformity.   Lymphadenopathy:     She has no cervical adenopathy.   Neurological: She is alert and oriented to person, place, and time. No cranial nerve deficit. Coordination normal.   Skin: Skin is warm and dry. No rash noted. She is not diaphoretic.   Psychiatric: She has a normal mood and affect. Her behavior is normal. Judgment and thought content normal.       Medications  Current Facility-Administered Medications   Medication Dose Route Frequency Provider Last Rate Last Dose   • metoclopramide (REGLAN) injection 10 mg  10 mg Intravenous Q6HRS Jeremy M Gonda, M.D.   10 mg at 11/11/18 0609   • acetaminophen (TYLENOL) tablet 650 mg  650 mg Oral Q6HRS PRN Jeremy M Gonda, M.D.   650 mg at 11/10/18 2113   • gabapentin (NEURONTIN) capsule 300 mg  300 mg Oral TID Jeremy M Gonda, M.D.   300 mg at 11/11/18 0609   • hyoscyamine-maalox plus-lidocaine viscous (GI COCKTAIL) oral susp 30 mL  30 mL Oral Q6HRS PRN Michael Rao D.O.       • lactated ringers infusion   Intravenous Continuous Jeremy M Gonda, M.D. 100 mL/hr at 11/10/18 2341     • insulin lispro (HUMALOG) injection 3-14 Units  3-14 Units Subcutaneous 4X/DAY ACHS  Jeremy M Gonda, M.D.   Stopped at 11/11/18 0600    And   • glucose 4 g chewable tablet 16 g  16 g Oral Q15 MIN PRN Jeremy M Gonda, M.D.   16 g at 11/11/18 0621    And   • dextrose 50% (D50W) injection 25 mL  25 mL Intravenous Q15 MIN PRN Jeremy M Gonda, M.D.       • insulin glargine (LANTUS) injection 30 Units  30 Units Subcutaneous BID Jeremy M Gonda, M.D.   30 Units at 11/10/18 2313   • senna-docusate (PERICOLACE or SENOKOT S) 8.6-50 MG per tablet 2 Tab  2 Tab Oral BID Crys Pierson M.D.   2 Tab at 11/10/18 1748    And   • polyethylene glycol/lytes (MIRALAX) PACKET 1 Packet  1 Packet Oral QDAY PRN Crys Pierson M.D.        And   • magnesium hydroxide (MILK OF MAGNESIA) suspension 30 mL  30 mL Oral QDAY PRN Crys Pierson M.D.        And   • bisacodyl (DULCOLAX) suppository 10 mg  10 mg Rectal QDAY PRN Crys Pierson M.D.       • heparin injection 5,000 Units  5,000 Units Subcutaneous Q8HRS Crys Pierson M.D.   5,000 Units at 11/11/18 0600   • ondansetron (ZOFRAN) syringe/vial injection 4 mg  4 mg Intravenous Q4HRS PRRONALD Pierson M.D.   4 mg at 11/10/18 1311   • ondansetron (ZOFRAN ODT) dispertab 4 mg  4 mg Oral Q4HRS PRN Crys Pierson M.D.       • promethazine (PHENERGAN) tablet 12.5-25 mg  12.5-25 mg Oral Q4HRS PRRONALD Pierson M.D.       • promethazine (PHENERGAN) suppository 12.5-25 mg  12.5-25 mg Rectal Q4HRS PRRONALD Pierson M.D.       • prochlorperazine (COMPAZINE) injection 5-10 mg  5-10 mg Intravenous Q4HRS PRN Crys Pierson M.D.   10 mg at 11/10/18 1032   • Pharmacy Consult Request ...Pain Management Review   Other PRN Crys Pierson M.D.       • atorvastatin (LIPITOR) tablet 20 mg  20 mg Oral DAILY Crys Pierson M.D.   20 mg at 11/11/18 0609   • ferrous sulfate tablet 325 mg  325 mg Oral DAILY Crys Pierson M.D.   325 mg at 11/11/18 0609       Fluids    Intake/Output Summary (Last 24 hours) at 11/11/18 0850  Last data filed at 11/11/18 0800   Gross  per 24 hour   Intake          3171.73 ml   Output             2200 ml   Net           971.73 ml       Laboratory          Recent Labs      11/09/18   2114  11/10/18   0145  11/10/18   0605  11/10/18   0955  11/11/18   0433   SODIUM  126*  131*  138  137  140   POTASSIUM  5.7*  4.1  4.5  3.9  3.7   CHLORIDE  94*  101  109  111  110   CO2  12*  11*  17*  18*  22   BUN  19  19  20  19  12   CREATININE  0.93  0.76  0.44*  0.51  0.53   MAGNESIUM  2.1  2.0   --   1.8   --    PHOSPHORUS  4.0  4.1   --   3.9   --    CALCIUM  9.7  9.1  8.9  8.7  8.5     Recent Labs      11/09/18   2114  11/10/18   0145  11/10/18   0605  11/10/18   0955  11/11/18   0433   ALTSGPT  19  15   --    --    --    ASTSGOT  10*  9*   --    --    --    ALKPHOSPHAT  169*  132*   --    --    --    TBILIRUBIN  0.7  0.5   --    --    --    LIPASE  16   --    --    --    --    GLUCOSE  542*  339*  159*  131*  71     Recent Labs      11/09/18   2114  11/10/18   0145   WBC  7.4  7.2   NEUTSPOLYS  61.70  45.00   LYMPHOCYTES  27.40  41.50*   MONOCYTES  7.90  10.50   EOSINOPHILS  2.00  1.90   BASOPHILS  0.50  0.70   ASTSGOT  10*  9*   ALTSGPT  19  15   ALKPHOSPHAT  169*  132*   TBILIRUBIN  0.7  0.5     Recent Labs      11/09/18   2114  11/10/18   0145   RBC  5.38  4.88   HEMOGLOBIN  15.5  14.0   HEMATOCRIT  46.7  42.0   PLATELETCT  255  260       Imaging  X-Ray:  No film today    Assessment/Plan  DM (diabetes mellitus) type 1, uncontrolled, with ketoacidosis (HCC)- (present on admission)   Assessment & Plan    Resolved status post DKA protocol  Diabetic diet  Long and short acting insulin  Education and encouragement with medication and behavioral compliance     Gastroparesis due to DM (HCC)- (present on admission)   Assessment & Plan    Continue Reglan  Small frequent meals  Avoid narcotics     GERD (gastroesophageal reflux disease)- (present on admission)   Assessment & Plan    Continue Reglan  Behavioral modification  H2 blocker     Difficult intravenous  access   Assessment & Plan    History of difficult access long-term  Status post central venous catheter placement 11/9 --> remove at time of discharge     Neuropathy (HCC)   Assessment & Plan    Secondary diabetes  Continue gabapentin     Obesity   Assessment & Plan    Weight loss encouraged     Full code    VTE:  Not Indicated  Ulcer: Not Indicated  Lines: None    I have performed a physical exam and reviewed and updated ROS and Plan today (11/11/2018). In review of yesterday's note (11/10/2018), there are no changes except as documented above.     Okay to discharge home today from pulmonary/critical care standpoint.  No need for outpatient pulmonary follow-up.  Again encouraged close primary care follow-up and compliance with treatment to prevent readmission.    Discussed patient condition and risk of morbidity and/or mortality with RN, RT, Pharmacy, , Charge nurse / hot rounds, Patient and Dr. Rao

## 2018-11-11 NOTE — DISCHARGE SUMMARY
Discharge Summary    CHIEF COMPLAINT ON ADMISSION  Chief Complaint   Patient presents with   • High Blood Sugar   • Dizziness   • RUQ Pain   • Back Pain       Reason for Admission  High Blood sugar      Admission Date  11/9/2018    CODE STATUS  Full Code    HPI & HOSPITAL COURSE  This is a 28 y.o. female here with diabetic ketoacidosis after awaking in the morning and feeling dizzy and had some right upper abdominal pain.  Work up was negative for infectious source.  She states taking her home insulin. The patient was treated in the ICU with an insulin drip and IV fluids and had resolve of pain, DKA, and was able to tolerate a diet.  She was subsequently discharged to home.         Therefore, she is discharged in good and stable condition to home with close outpatient follow-up.    The patient met 2-midnight criteria for an inpatient stay at the time of discharge.    Discharge Date  11/11/2018    FOLLOW UP ITEMS POST DISCHARGE  None    DISCHARGE DIAGNOSES  Principal Problem (Resolved):    DKA (diabetic ketoacidoses) (HCC) POA: Unknown  Active Problems:    GERD (gastroesophageal reflux disease) POA: Yes    Gastroparesis due to DM (HCC) POA: Yes    Obesity POA: Unknown    Neuropathy (HCC) POA: Unknown    Difficult intravenous access POA: Unknown  Resolved Problems:    Hyperkalemia POA: Unknown    RUQ pain POA: Unknown    Hyponatremia POA: Yes    Tachycardia POA: Unknown      FOLLOW UP  No future appointments.  Navi Huber M.D.  [address] [place] [id number]  [phone]    Schedule an appointment as soon as possible for a visit in 2 weeks        MEDICATIONS ON DISCHARGE     Medication List      CONTINUE taking these medications      Instructions   atorvastatin 20 MG Tabs  Commonly known as:  LIPITOR   Take 1 Tab by mouth every day.  Dose:  20 mg     BASAGLAR KWIKPEN 100 UNIT/ML Sopn   Inject 30 Units as instructed 2 Times a Day.  Dose:  30 Units     Cholecalciferol 2000 UNIT Caps   Take 1 Cap by  mouth every day.  Dose:  1 Cap     ferrous sulfate 325 (65 Fe) MG tablet   Take 325 mg by mouth every day.  Dose:  325 mg     gabapentin 100 MG Caps  Commonly known as:  NEURONTIN   Take 1 Cap by mouth 2 Times a Day.  Dose:  100 mg     insulin glulisine 100 UNIT/ML Sopn injection  Commonly known as:  APIDRA   Inject 2-20 Units as instructed 3 times a day, with meals. FSBS base 150 For every 50 increase in blood sugar insulin doubles 200 = 2 units 250 = 4 units 300 = 8 units 350 =  16 units 400 = 32 units  Dose:  2-20 Units     metoclopramide 10 MG/10ML Soln  Commonly known as:  REGLAN   Take 10 mg by mouth 3 times a day before meals.  Dose:  10 mg     OMEGA 3 PO   Take 1 Dose by mouth every day. Unknown OTC Strength  Dose:  1 Dose     ondansetron 4 MG Tbdp  Commonly known as:  ZOFRAN ODT   Take 1 Tab by mouth every 6 hours as needed for Nausea.  Dose:  4 mg            Allergies  Allergies   Allergen Reactions   • Pcn [Penicillins] Shortness of Breath and Swelling     Per patient's Mom, patient tolerates Keflex   • Lisinopril Unspecified     Per historical: Reported pedal swelling. No facial/angioedema or rash nor respiratory symptoms.    • Promethazine Vomiting       DIET  Orders Placed This Encounter   Procedures   • Diet Order Diabetic     Standing Status:   Standing     Number of Occurrences:   1     Order Specific Question:   Diet:     Answer:   Diabetic [3]       ACTIVITY  As tolerated.  Weight bearing as tolerated    CONSULTATIONS  Intensivist    PROCEDURES  None    LABORATORY  Lab Results   Component Value Date    SODIUM 140 11/11/2018    POTASSIUM 3.7 11/11/2018    CHLORIDE 110 11/11/2018    CO2 22 11/11/2018    GLUCOSE 71 11/11/2018    BUN 12 11/11/2018    CREATININE 0.53 11/11/2018        Lab Results   Component Value Date    WBC 7.2 11/10/2018    HEMOGLOBIN 14.0 11/10/2018    HEMATOCRIT 42.0 11/10/2018    PLATELETCT 260 11/10/2018        Total time of the discharge process exceeds 28 minutes.

## 2018-11-11 NOTE — DISCHARGE INSTRUCTIONS
Discharge Instructions    Discharged to home by car with relative. Discharged via walking, hospital escort: Refused.  Special equipment needed: Not Applicable    Be sure to schedule a follow-up appointment with your primary care doctor or any specialists as instructed.     Discharge Plan:   Diet Plan: Discussed  Activity Level: Discussed  Confirmed Follow up Appointment: Patient to Call and Schedule Appointment  Confirmed Symptoms Management: Discussed  Medication Reconciliation Updated: Yes  Pneumococcal Vaccine Administered/Refused: Not given - Patient refused pneumococcal vaccine  Influenza Vaccine Indication: Patient Refuses    I understand that a diet low in cholesterol, fat, and sodium is recommended for good health. Unless I have been given specific instructions below for another diet, I accept this instruction as my diet prescription.   Other diet: Diabetic diet    Special Instructions: None    · Is patient discharged on Warfarin / Coumadin?   No     Depression / Suicide Risk    As you are discharged from this [facility] facility, it is important to learn how to keep safe from harming yourself.    Recognize the warning signs:  · Abrupt changes in personality, positive or negative- including increase in energy   · Giving away possessions  · Change in eating patterns- significant weight changes-  positive or negative  · Change in sleeping patterns- unable to sleep or sleeping all the time   · Unwillingness or inability to communicate  · Depression  · Unusual sadness, discouragement and loneliness  · Talk of wanting to die  · Neglect of personal appearance   · Rebelliousness- reckless behavior  · Withdrawal from people/activities they love  · Confusion- inability to concentrate    Sent home with DKA, Type 1 Diabetes, Hyperglycemia, Meal Planning and Carbohydrates [employer] handouts.     Symptoms of DKA:  - thirst and dry mouth  - urinating a lot  - belly pain  - nausea and vomiting  - breath that smells  fruity     If you or a loved one observes any of these behaviors or has concerns about self-harm, here's what you can do:  · Talk about it- your feelings and reasons for harming yourself  · Remove any means that you might use to hurt yourself (examples: pills, rope, extension cords, firearm)  · Get professional help from the community (Mental Health, Substance Abuse, psychological counseling)  · Do not be alone:Call your Safe Contact- someone whom you trust who will be there for you.  · Call your local CRISIS HOTLINE 612-6119 or 027-492-7912  · Call your local Children's Mobile Crisis Response Team [place] (056) 401-1963 or www.waygum  · Call the toll free National Suicide Prevention Hotlines   · National Suicide Prevention Lifeline 436-556-XGMS (0081)  · National Hope Line Network 800-SUICIDE (585-0196)

## 2018-11-12 NOTE — ASSESSMENT & PLAN NOTE
Resolved status post DKA protocol  Diabetic diet  Long and short acting insulin  Education and encouragement with medication and behavioral compliance

## 2018-12-18 ENCOUNTER — APPOINTMENT (OUTPATIENT)
Dept: RADIOLOGY | Facility: MEDICAL CENTER | Age: 28
DRG: 637 | End: 2018-12-18
Attending: STUDENT IN AN ORGANIZED HEALTH CARE EDUCATION/TRAINING PROGRAM
Payer: MEDICAID

## 2018-12-18 ENCOUNTER — APPOINTMENT (OUTPATIENT)
Dept: RADIOLOGY | Facility: MEDICAL CENTER | Age: 28
DRG: 637 | End: 2018-12-18
Attending: INTERNAL MEDICINE
Payer: MEDICAID

## 2018-12-18 ENCOUNTER — HOSPITAL ENCOUNTER (INPATIENT)
Facility: MEDICAL CENTER | Age: 28
LOS: 11 days | DRG: 637 | End: 2018-12-29
Attending: EMERGENCY MEDICINE | Admitting: INTERNAL MEDICINE
Payer: MEDICAID

## 2018-12-18 DIAGNOSIS — E10.11 DIABETIC KETOACIDOSIS WITH COMA ASSOCIATED WITH TYPE 1 DIABETES MELLITUS (HCC): ICD-10-CM

## 2018-12-18 DIAGNOSIS — E10.10 DM (DIABETES MELLITUS) TYPE 1, UNCONTROLLED, WITH KETOACIDOSIS (HCC): ICD-10-CM

## 2018-12-18 LAB
ACTION RANGE TRIGGERED IACRT: YES
ALBUMIN SERPL BCP-MCNC: 4.9 G/DL (ref 3.2–4.9)
ALBUMIN/GLOB SERPL: 1.8 G/DL
ALP SERPL-CCNC: 116 U/L (ref 30–99)
ALT SERPL-CCNC: 16 U/L (ref 2–50)
ANION GAP SERPL CALC-SCNC: 19 MMOL/L (ref 0–11.9)
ANION GAP SERPL CALC-SCNC: 23 MMOL/L (ref 0–11.9)
ANION GAP SERPL CALC-SCNC: 25 MMOL/L (ref 0–11.9)
ANION GAP SERPL CALC-SCNC: 9 MMOL/L (ref 0–11.9)
APPEARANCE UR: CLEAR
AST SERPL-CCNC: 11 U/L (ref 12–45)
B-OH-BUTYR SERPL-MCNC: >8 MMOL/L (ref 0.02–0.27)
BACTERIA #/AREA URNS HPF: ABNORMAL /HPF
BASE EXCESS BLDA CALC-SCNC: -24 MMOL/L (ref -4–3)
BASE EXCESS BLDV CALC-SCNC: -29 MMOL/L
BASOPHILS # BLD AUTO: 0.6 % (ref 0–1.8)
BASOPHILS # BLD: 0.09 K/UL (ref 0–0.12)
BILIRUB SERPL-MCNC: 0.4 MG/DL (ref 0.1–1.5)
BILIRUB UR QL STRIP.AUTO: NEGATIVE
BODY TEMPERATURE: ABNORMAL CENTIGRADE
BODY TEMPERATURE: ABNORMAL DEGREES
BUN SERPL-MCNC: 15 MG/DL (ref 8–22)
BUN SERPL-MCNC: 18 MG/DL (ref 8–22)
BUN SERPL-MCNC: 25 MG/DL (ref 8–22)
BUN SERPL-MCNC: 25 MG/DL (ref 8–22)
CALCIUM SERPL-MCNC: 8 MG/DL (ref 8.5–10.5)
CALCIUM SERPL-MCNC: 8.2 MG/DL (ref 8.5–10.5)
CALCIUM SERPL-MCNC: 8.7 MG/DL (ref 8.5–10.5)
CALCIUM SERPL-MCNC: 9 MG/DL (ref 8.5–10.5)
CHLORIDE SERPL-SCNC: 108 MMOL/L (ref 96–112)
CHLORIDE SERPL-SCNC: 112 MMOL/L (ref 96–112)
CHLORIDE SERPL-SCNC: 119 MMOL/L (ref 96–112)
CHLORIDE SERPL-SCNC: 119 MMOL/L (ref 96–112)
CO2 BLDA-SCNC: <5 MMOL/L (ref 20–33)
CO2 SERPL-SCNC: 17 MMOL/L (ref 20–33)
CO2 SERPL-SCNC: 5 MMOL/L (ref 20–33)
CO2 SERPL-SCNC: 8 MMOL/L (ref 20–33)
CO2 SERPL-SCNC: <5 MMOL/L (ref 20–33)
COLOR UR: YELLOW
CREAT SERPL-MCNC: 0.58 MG/DL (ref 0.5–1.4)
CREAT SERPL-MCNC: 0.65 MG/DL (ref 0.5–1.4)
CREAT SERPL-MCNC: 1.15 MG/DL (ref 0.5–1.4)
CREAT SERPL-MCNC: 1.16 MG/DL (ref 0.5–1.4)
EOSINOPHIL # BLD AUTO: 0.03 K/UL (ref 0–0.51)
EOSINOPHIL NFR BLD: 0.2 % (ref 0–6.9)
EPI CELLS #/AREA URNS HPF: ABNORMAL /HPF
ERYTHROCYTE [DISTWIDTH] IN BLOOD BY AUTOMATED COUNT: 44.3 FL (ref 35.9–50)
GLOBULIN SER CALC-MCNC: 2.8 G/DL (ref 1.9–3.5)
GLUCOSE BLD-MCNC: 143 MG/DL (ref 65–99)
GLUCOSE BLD-MCNC: 152 MG/DL (ref 65–99)
GLUCOSE BLD-MCNC: 157 MG/DL (ref 65–99)
GLUCOSE BLD-MCNC: 168 MG/DL (ref 65–99)
GLUCOSE BLD-MCNC: 174 MG/DL (ref 65–99)
GLUCOSE BLD-MCNC: 253 MG/DL (ref 65–99)
GLUCOSE BLD-MCNC: 295 MG/DL (ref 65–99)
GLUCOSE BLD-MCNC: 321 MG/DL (ref 65–99)
GLUCOSE BLD-MCNC: 390 MG/DL (ref 65–99)
GLUCOSE BLD-MCNC: 394 MG/DL (ref 65–99)
GLUCOSE BLD-MCNC: 424 MG/DL (ref 65–99)
GLUCOSE SERPL-MCNC: 169 MG/DL (ref 65–99)
GLUCOSE SERPL-MCNC: 174 MG/DL (ref 65–99)
GLUCOSE SERPL-MCNC: 369 MG/DL (ref 65–99)
GLUCOSE SERPL-MCNC: 389 MG/DL (ref 65–99)
GLUCOSE UR STRIP.AUTO-MCNC: >=1000 MG/DL
HCG SERPL QL: NEGATIVE
HCO3 BLDA-SCNC: 3.7 MMOL/L (ref 17–25)
HCO3 BLDV-SCNC: 3 MMOL/L (ref 24–28)
HCT VFR BLD AUTO: 51.8 % (ref 37–47)
HGB BLD-MCNC: 16.6 G/DL (ref 12–16)
HOROWITZ INDEX BLDA+IHG-RTO: 476 MM[HG]
HYALINE CASTS #/AREA URNS LPF: ABNORMAL /LPF
IMM GRANULOCYTES # BLD AUTO: 0.16 K/UL (ref 0–0.11)
IMM GRANULOCYTES NFR BLD AUTO: 1 % (ref 0–0.9)
INST. QUALIFIED PATIENT IIQPT: YES
KETONES UR STRIP.AUTO-MCNC: >=160 MG/DL
LEUKOCYTE ESTERASE UR QL STRIP.AUTO: NEGATIVE
LIPASE SERPL-CCNC: 15 U/L (ref 11–82)
LIPASE SERPL-CCNC: 32 U/L (ref 11–82)
LYMPHOCYTES # BLD AUTO: 2.81 K/UL (ref 1–4.8)
LYMPHOCYTES NFR BLD: 17.8 % (ref 22–41)
MAGNESIUM SERPL-MCNC: 1.4 MG/DL (ref 1.5–2.5)
MAGNESIUM SERPL-MCNC: 2 MG/DL (ref 1.5–2.5)
MAGNESIUM SERPL-MCNC: 2.2 MG/DL (ref 1.5–2.5)
MCH RBC QN AUTO: 28.9 PG (ref 27–33)
MCHC RBC AUTO-ENTMCNC: 32 G/DL (ref 33.6–35)
MCV RBC AUTO: 90.1 FL (ref 81.4–97.8)
MICRO URNS: ABNORMAL
MONOCYTES # BLD AUTO: 1.11 K/UL (ref 0–0.85)
MONOCYTES NFR BLD AUTO: 7 % (ref 0–13.4)
NEUTROPHILS # BLD AUTO: 11.6 K/UL (ref 2–7.15)
NEUTROPHILS NFR BLD: 73.4 % (ref 44–72)
NITRITE UR QL STRIP.AUTO: NEGATIVE
NRBC # BLD AUTO: 0 K/UL
NRBC BLD-RTO: 0 /100 WBC
O2/TOTAL GAS SETTING VFR VENT: 21 %
PCO2 BLDA: 12.2 MMHG (ref 26–37)
PCO2 BLDV: 15.8 MMHG (ref 41–51)
PCO2 TEMP ADJ BLDA: 12.1 MMHG (ref 26–37)
PH BLDA: 7.09 [PH] (ref 7.4–7.5)
PH BLDV: 6.91 [PH] (ref 7.31–7.45)
PH TEMP ADJ BLDA: 7.09 [PH] (ref 7.4–7.5)
PH UR STRIP.AUTO: 5 [PH]
PHOSPHATE SERPL-MCNC: 4.6 MG/DL (ref 2.5–4.5)
PHOSPHATE SERPL-MCNC: <1 MG/DL (ref 2.5–4.5)
PLATELET # BLD AUTO: 303 K/UL (ref 164–446)
PMV BLD AUTO: 10.8 FL (ref 9–12.9)
PO2 BLDA: 100 MMHG (ref 64–87)
PO2 BLDV: 55.3 MMHG (ref 25–40)
PO2 TEMP ADJ BLDA: 98 MMHG (ref 64–87)
POTASSIUM SERPL-SCNC: 3.3 MMOL/L (ref 3.6–5.5)
POTASSIUM SERPL-SCNC: 3.3 MMOL/L (ref 3.6–5.5)
POTASSIUM SERPL-SCNC: 4.3 MMOL/L (ref 3.6–5.5)
POTASSIUM SERPL-SCNC: 5.1 MMOL/L (ref 3.6–5.5)
PROT SERPL-MCNC: 7.7 G/DL (ref 6–8.2)
PROT UR QL STRIP: 100 MG/DL
RBC # BLD AUTO: 5.75 M/UL (ref 4.2–5.4)
RBC # URNS HPF: ABNORMAL /HPF
RBC UR QL AUTO: ABNORMAL
SAO2 % BLDA: 95 % (ref 93–99)
SAO2 % BLDV: 83.9 %
SODIUM SERPL-SCNC: 138 MMOL/L (ref 135–145)
SODIUM SERPL-SCNC: 140 MMOL/L (ref 135–145)
SODIUM SERPL-SCNC: 145 MMOL/L (ref 135–145)
SODIUM SERPL-SCNC: 146 MMOL/L (ref 135–145)
SP GR UR STRIP.AUTO: 1.03
SPECIMEN DRAWN FROM PATIENT: ABNORMAL
UROBILINOGEN UR STRIP.AUTO-MCNC: 0.2 MG/DL
WBC # BLD AUTO: 15.8 K/UL (ref 4.8–10.8)
WBC #/AREA URNS HPF: ABNORMAL /HPF

## 2018-12-18 PROCEDURE — 80053 COMPREHEN METABOLIC PANEL: CPT

## 2018-12-18 PROCEDURE — 84100 ASSAY OF PHOSPHORUS: CPT

## 2018-12-18 PROCEDURE — 700105 HCHG RX REV CODE 258: Performed by: EMERGENCY MEDICINE

## 2018-12-18 PROCEDURE — 82010 KETONE BODYS QUAN: CPT

## 2018-12-18 PROCEDURE — 700111 HCHG RX REV CODE 636 W/ 250 OVERRIDE (IP): Performed by: INTERNAL MEDICINE

## 2018-12-18 PROCEDURE — 700102 HCHG RX REV CODE 250 W/ 637 OVERRIDE(OP): Performed by: INTERNAL MEDICINE

## 2018-12-18 PROCEDURE — 93005 ELECTROCARDIOGRAM TRACING: CPT | Performed by: STUDENT IN AN ORGANIZED HEALTH CARE EDUCATION/TRAINING PROGRAM

## 2018-12-18 PROCEDURE — 71045 X-RAY EXAM CHEST 1 VIEW: CPT

## 2018-12-18 PROCEDURE — 81001 URINALYSIS AUTO W/SCOPE: CPT

## 2018-12-18 PROCEDURE — 700101 HCHG RX REV CODE 250: Performed by: STUDENT IN AN ORGANIZED HEALTH CARE EDUCATION/TRAINING PROGRAM

## 2018-12-18 PROCEDURE — 82803 BLOOD GASES ANY COMBINATION: CPT

## 2018-12-18 PROCEDURE — C1751 CATH, INF, PER/CENT/MIDLINE: HCPCS

## 2018-12-18 PROCEDURE — 85025 COMPLETE CBC W/AUTO DIFF WBC: CPT

## 2018-12-18 PROCEDURE — 99291 CRITICAL CARE FIRST HOUR: CPT

## 2018-12-18 PROCEDURE — 96365 THER/PROPH/DIAG IV INF INIT: CPT

## 2018-12-18 PROCEDURE — 36600 WITHDRAWAL OF ARTERIAL BLOOD: CPT

## 2018-12-18 PROCEDURE — 700111 HCHG RX REV CODE 636 W/ 250 OVERRIDE (IP): Performed by: STUDENT IN AN ORGANIZED HEALTH CARE EDUCATION/TRAINING PROGRAM

## 2018-12-18 PROCEDURE — 96376 TX/PRO/DX INJ SAME DRUG ADON: CPT

## 2018-12-18 PROCEDURE — A9270 NON-COVERED ITEM OR SERVICE: HCPCS | Performed by: INTERNAL MEDICINE

## 2018-12-18 PROCEDURE — 700102 HCHG RX REV CODE 250 W/ 637 OVERRIDE(OP): Performed by: EMERGENCY MEDICINE

## 2018-12-18 PROCEDURE — 83735 ASSAY OF MAGNESIUM: CPT

## 2018-12-18 PROCEDURE — 84703 CHORIONIC GONADOTROPIN ASSAY: CPT

## 2018-12-18 PROCEDURE — 700105 HCHG RX REV CODE 258: Performed by: INTERNAL MEDICINE

## 2018-12-18 PROCEDURE — 700105 HCHG RX REV CODE 258: Performed by: STUDENT IN AN ORGANIZED HEALTH CARE EDUCATION/TRAINING PROGRAM

## 2018-12-18 PROCEDURE — 99292 CRITICAL CARE ADDL 30 MIN: CPT | Mod: 25 | Performed by: INTERNAL MEDICINE

## 2018-12-18 PROCEDURE — 83690 ASSAY OF LIPASE: CPT

## 2018-12-18 PROCEDURE — 700102 HCHG RX REV CODE 250 W/ 637 OVERRIDE(OP): Performed by: STUDENT IN AN ORGANIZED HEALTH CARE EDUCATION/TRAINING PROGRAM

## 2018-12-18 PROCEDURE — A9270 NON-COVERED ITEM OR SERVICE: HCPCS | Performed by: STUDENT IN AN ORGANIZED HEALTH CARE EDUCATION/TRAINING PROGRAM

## 2018-12-18 PROCEDURE — 82962 GLUCOSE BLOOD TEST: CPT | Mod: 91

## 2018-12-18 PROCEDURE — 36556 INSERT NON-TUNNEL CV CATH: CPT | Mod: RT | Performed by: INTERNAL MEDICINE

## 2018-12-18 PROCEDURE — 96375 TX/PRO/DX INJ NEW DRUG ADDON: CPT

## 2018-12-18 PROCEDURE — 700111 HCHG RX REV CODE 636 W/ 250 OVERRIDE (IP): Performed by: EMERGENCY MEDICINE

## 2018-12-18 PROCEDURE — 770022 HCHG ROOM/CARE - ICU (200)

## 2018-12-18 PROCEDURE — 76775 US EXAM ABDO BACK WALL LIM: CPT

## 2018-12-18 PROCEDURE — 99291 CRITICAL CARE FIRST HOUR: CPT | Mod: 25 | Performed by: INTERNAL MEDICINE

## 2018-12-18 PROCEDURE — 700111 HCHG RX REV CODE 636 W/ 250 OVERRIDE (IP)

## 2018-12-18 PROCEDURE — 36556 INSERT NON-TUNNEL CV CATH: CPT

## 2018-12-18 PROCEDURE — 93010 ELECTROCARDIOGRAM REPORT: CPT | Performed by: INTERNAL MEDICINE

## 2018-12-18 PROCEDURE — 80048 BASIC METABOLIC PNL TOTAL CA: CPT

## 2018-12-18 RX ORDER — METOCLOPRAMIDE HYDROCHLORIDE 5 MG/ML
5 INJECTION INTRAMUSCULAR; INTRAVENOUS ONCE
Status: COMPLETED | OUTPATIENT
Start: 2018-12-18 | End: 2018-12-18

## 2018-12-18 RX ORDER — MAGNESIUM SULFATE HEPTAHYDRATE 40 MG/ML
2 INJECTION, SOLUTION INTRAVENOUS
Status: COMPLETED | OUTPATIENT
Start: 2018-12-18 | End: 2018-12-18

## 2018-12-18 RX ORDER — POTASSIUM CHLORIDE 7.45 MG/ML
10 INJECTION INTRAVENOUS ONCE
Status: COMPLETED | OUTPATIENT
Start: 2018-12-18 | End: 2018-12-18

## 2018-12-18 RX ORDER — SODIUM CHLORIDE, SODIUM LACTATE, POTASSIUM CHLORIDE, CALCIUM CHLORIDE 600; 310; 30; 20 MG/100ML; MG/100ML; MG/100ML; MG/100ML
2000 INJECTION, SOLUTION INTRAVENOUS ONCE
Status: COMPLETED | OUTPATIENT
Start: 2018-12-18 | End: 2018-12-18

## 2018-12-18 RX ORDER — BISACODYL 10 MG
10 SUPPOSITORY, RECTAL RECTAL
Status: DISCONTINUED | OUTPATIENT
Start: 2018-12-18 | End: 2018-12-29 | Stop reason: HOSPADM

## 2018-12-18 RX ORDER — ONDANSETRON 2 MG/ML
4 INJECTION INTRAMUSCULAR; INTRAVENOUS ONCE
Status: COMPLETED | OUTPATIENT
Start: 2018-12-18 | End: 2018-12-18

## 2018-12-18 RX ORDER — DEXTROSE AND SODIUM CHLORIDE 10; .45 G/100ML; G/100ML
INJECTION, SOLUTION INTRAVENOUS CONTINUOUS
Status: ACTIVE | OUTPATIENT
Start: 2018-12-18 | End: 2018-12-19

## 2018-12-18 RX ORDER — ATORVASTATIN CALCIUM 20 MG/1
20 TABLET, FILM COATED ORAL DAILY
Status: DISCONTINUED | OUTPATIENT
Start: 2018-12-19 | End: 2018-12-29 | Stop reason: HOSPADM

## 2018-12-18 RX ORDER — SODIUM CHLORIDE, SODIUM LACTATE, POTASSIUM CHLORIDE, CALCIUM CHLORIDE 600; 310; 30; 20 MG/100ML; MG/100ML; MG/100ML; MG/100ML
INJECTION, SOLUTION INTRAVENOUS CONTINUOUS
Status: DISCONTINUED | OUTPATIENT
Start: 2018-12-18 | End: 2018-12-18

## 2018-12-18 RX ORDER — ONDANSETRON 2 MG/ML
INJECTION INTRAMUSCULAR; INTRAVENOUS
Status: COMPLETED
Start: 2018-12-18 | End: 2018-12-18

## 2018-12-18 RX ORDER — SODIUM CHLORIDE, SODIUM LACTATE, POTASSIUM CHLORIDE, AND CALCIUM CHLORIDE .6; .31; .03; .02 G/100ML; G/100ML; G/100ML; G/100ML
2000 INJECTION, SOLUTION INTRAVENOUS ONCE
Status: COMPLETED | OUTPATIENT
Start: 2018-12-18 | End: 2018-12-18

## 2018-12-18 RX ORDER — MAGNESIUM SULFATE HEPTAHYDRATE 40 MG/ML
4 INJECTION, SOLUTION INTRAVENOUS
Status: COMPLETED | OUTPATIENT
Start: 2018-12-18 | End: 2018-12-18

## 2018-12-18 RX ORDER — ONDANSETRON 2 MG/ML
4 INJECTION INTRAMUSCULAR; INTRAVENOUS EVERY 4 HOURS PRN
Status: DISCONTINUED | OUTPATIENT
Start: 2018-12-18 | End: 2018-12-29 | Stop reason: HOSPADM

## 2018-12-18 RX ORDER — POLYETHYLENE GLYCOL 3350 17 G/17G
1 POWDER, FOR SOLUTION ORAL
Status: DISCONTINUED | OUTPATIENT
Start: 2018-12-18 | End: 2018-12-29 | Stop reason: HOSPADM

## 2018-12-18 RX ORDER — SODIUM CHLORIDE, SODIUM LACTATE, POTASSIUM CHLORIDE, AND CALCIUM CHLORIDE .6; .31; .03; .02 G/100ML; G/100ML; G/100ML; G/100ML
3000 INJECTION, SOLUTION INTRAVENOUS ONCE
Status: DISCONTINUED | OUTPATIENT
Start: 2018-12-18 | End: 2018-12-18

## 2018-12-18 RX ORDER — POTASSIUM CHLORIDE 14.9 MG/ML
20 INJECTION INTRAVENOUS ONCE
Status: COMPLETED | OUTPATIENT
Start: 2018-12-18 | End: 2018-12-18

## 2018-12-18 RX ORDER — ACETAMINOPHEN 325 MG/1
650 TABLET ORAL EVERY 6 HOURS PRN
Status: DISCONTINUED | OUTPATIENT
Start: 2018-12-18 | End: 2018-12-29 | Stop reason: HOSPADM

## 2018-12-18 RX ORDER — SODIUM CHLORIDE 9 MG/ML
1000 INJECTION, SOLUTION INTRAVENOUS ONCE
Status: DISCONTINUED | OUTPATIENT
Start: 2018-12-18 | End: 2018-12-18

## 2018-12-18 RX ORDER — ONDANSETRON 4 MG/1
4 TABLET, ORALLY DISINTEGRATING ORAL EVERY 4 HOURS PRN
Status: DISCONTINUED | OUTPATIENT
Start: 2018-12-18 | End: 2018-12-29 | Stop reason: HOSPADM

## 2018-12-18 RX ORDER — AMOXICILLIN 250 MG
2 CAPSULE ORAL 2 TIMES DAILY
Status: DISCONTINUED | OUTPATIENT
Start: 2018-12-18 | End: 2018-12-29 | Stop reason: HOSPADM

## 2018-12-18 RX ORDER — DIPHENHYDRAMINE HYDROCHLORIDE 50 MG/ML
25 INJECTION INTRAMUSCULAR; INTRAVENOUS ONCE
Status: COMPLETED | OUTPATIENT
Start: 2018-12-18 | End: 2018-12-18

## 2018-12-18 RX ORDER — DEXTROSE, SODIUM CHLORIDE, SODIUM LACTATE, POTASSIUM CHLORIDE, AND CALCIUM CHLORIDE 5; .6; .31; .03; .02 G/100ML; G/100ML; G/100ML; G/100ML; G/100ML
INJECTION, SOLUTION INTRAVENOUS CONTINUOUS
Status: DISCONTINUED | OUTPATIENT
Start: 2018-12-18 | End: 2018-12-18

## 2018-12-18 RX ORDER — HYDROMORPHONE HYDROCHLORIDE 1 MG/ML
0.5 INJECTION, SOLUTION INTRAMUSCULAR; INTRAVENOUS; SUBCUTANEOUS
Status: DISCONTINUED | OUTPATIENT
Start: 2018-12-18 | End: 2018-12-21

## 2018-12-18 RX ORDER — METOCLOPRAMIDE HYDROCHLORIDE 5 MG/5ML
10 SOLUTION ORAL
Status: DISCONTINUED | OUTPATIENT
Start: 2018-12-18 | End: 2018-12-19

## 2018-12-18 RX ORDER — SODIUM CHLORIDE 9 MG/ML
2000 INJECTION, SOLUTION INTRAVENOUS ONCE
Status: COMPLETED | OUTPATIENT
Start: 2018-12-18 | End: 2018-12-18

## 2018-12-18 RX ORDER — DEXTROSE, SODIUM CHLORIDE, SODIUM LACTATE, POTASSIUM CHLORIDE, AND CALCIUM CHLORIDE 5; .6; .31; .03; .02 G/100ML; G/100ML; G/100ML; G/100ML; G/100ML
INJECTION, SOLUTION INTRAVENOUS CONTINUOUS
Status: DISCONTINUED | OUTPATIENT
Start: 2018-12-18 | End: 2018-12-19

## 2018-12-18 RX ORDER — DEXTROSE MONOHYDRATE 25 G/50ML
25 INJECTION, SOLUTION INTRAVENOUS
Status: DISCONTINUED | OUTPATIENT
Start: 2018-12-18 | End: 2018-12-18

## 2018-12-18 RX ORDER — HYDRALAZINE HYDROCHLORIDE 20 MG/ML
10 INJECTION INTRAMUSCULAR; INTRAVENOUS EVERY 4 HOURS PRN
Status: DISCONTINUED | OUTPATIENT
Start: 2018-12-18 | End: 2018-12-26

## 2018-12-18 RX ADMIN — ONDANSETRON 4 MG: 2 INJECTION INTRAMUSCULAR; INTRAVENOUS at 16:43

## 2018-12-18 RX ADMIN — SODIUM CHLORIDE, POTASSIUM CHLORIDE, SODIUM LACTATE AND CALCIUM CHLORIDE: 600; 310; 30; 20 INJECTION, SOLUTION INTRAVENOUS at 12:19

## 2018-12-18 RX ADMIN — ONDANSETRON 4 MG: 2 INJECTION INTRAMUSCULAR; INTRAVENOUS at 21:27

## 2018-12-18 RX ADMIN — POTASSIUM CHLORIDE 20 MEQ: 14.9 INJECTION, SOLUTION INTRAVENOUS at 18:40

## 2018-12-18 RX ADMIN — HYDROMORPHONE HYDROCHLORIDE 0.5 MG: 1 INJECTION, SOLUTION INTRAMUSCULAR; INTRAVENOUS; SUBCUTANEOUS at 18:23

## 2018-12-18 RX ADMIN — SODIUM CHLORIDE, POTASSIUM CHLORIDE, SODIUM LACTATE AND CALCIUM CHLORIDE 2000 ML: 600; 310; 30; 20 INJECTION, SOLUTION INTRAVENOUS at 14:35

## 2018-12-18 RX ADMIN — POTASSIUM PHOSPHATE, MONOBASIC AND POTASSIUM PHOSPHATE, DIBASIC 30 MMOL: 224; 236 INJECTION, SOLUTION INTRAVENOUS at 18:47

## 2018-12-18 RX ADMIN — SODIUM CHLORIDE, POTASSIUM CHLORIDE, SODIUM LACTATE AND CALCIUM CHLORIDE 2000 ML: 600; 310; 30; 20 INJECTION, SOLUTION INTRAVENOUS at 13:27

## 2018-12-18 RX ADMIN — SODIUM CHLORIDE, SODIUM LACTATE, POTASSIUM CHLORIDE, CALCIUM CHLORIDE AND DEXTROSE MONOHYDRATE: 5; 600; 310; 30; 20 INJECTION, SOLUTION INTRAVENOUS at 23:08

## 2018-12-18 RX ADMIN — SODIUM BICARBONATE 100 MEQ: 84 INJECTION, SOLUTION INTRAVENOUS at 15:35

## 2018-12-18 RX ADMIN — SODIUM CHLORIDE, SODIUM LACTATE, POTASSIUM CHLORIDE, CALCIUM CHLORIDE AND DEXTROSE MONOHYDRATE: 5; 600; 310; 30; 20 INJECTION, SOLUTION INTRAVENOUS at 16:07

## 2018-12-18 RX ADMIN — SODIUM CHLORIDE 0.05 UNITS/KG/HR: 9 INJECTION, SOLUTION INTRAVENOUS at 11:06

## 2018-12-18 RX ADMIN — SODIUM BICARBONATE 150 MEQ: 84 INJECTION, SOLUTION INTRAVENOUS at 17:06

## 2018-12-18 RX ADMIN — FENTANYL CITRATE 50 MCG: 50 INJECTION, SOLUTION INTRAMUSCULAR; INTRAVENOUS at 12:00

## 2018-12-18 RX ADMIN — HYDROMORPHONE HYDROCHLORIDE 0.5 MG: 1 INJECTION, SOLUTION INTRAMUSCULAR; INTRAVENOUS; SUBCUTANEOUS at 15:17

## 2018-12-18 RX ADMIN — METOCLOPRAMIDE 5 MG: 5 INJECTION, SOLUTION INTRAMUSCULAR; INTRAVENOUS at 08:29

## 2018-12-18 RX ADMIN — MAGNESIUM SULFATE IN WATER 2 G: 40 INJECTION, SOLUTION INTRAVENOUS at 18:25

## 2018-12-18 RX ADMIN — METOCLOPRAMIDE HYDROCHLORIDE 10 MG: 5 SOLUTION ORAL at 20:06

## 2018-12-18 RX ADMIN — FENTANYL CITRATE 50 MCG: 50 INJECTION, SOLUTION INTRAMUSCULAR; INTRAVENOUS at 13:38

## 2018-12-18 RX ADMIN — DIPHENHYDRAMINE HYDROCHLORIDE 25 MG: 50 INJECTION, SOLUTION INTRAMUSCULAR; INTRAVENOUS at 08:28

## 2018-12-18 RX ADMIN — ACETAMINOPHEN 650 MG: 325 TABLET, FILM COATED ORAL at 15:39

## 2018-12-18 RX ADMIN — DEXTROSE AND SODIUM CHLORIDE: 10; .45 INJECTION, SOLUTION INTRAVENOUS at 23:59

## 2018-12-18 RX ADMIN — FENTANYL CITRATE 50 MCG: 50 INJECTION, SOLUTION INTRAMUSCULAR; INTRAVENOUS at 08:29

## 2018-12-18 RX ADMIN — SODIUM CHLORIDE, POTASSIUM CHLORIDE, SODIUM LACTATE AND CALCIUM CHLORIDE 2000 ML: 600; 310; 30; 20 INJECTION, SOLUTION INTRAVENOUS at 08:15

## 2018-12-18 RX ADMIN — POTASSIUM CHLORIDE 10 MEQ: 7.46 INJECTION, SOLUTION INTRAVENOUS at 13:52

## 2018-12-18 RX ADMIN — ONDANSETRON HYDROCHLORIDE 4 MG: 2 INJECTION, SOLUTION INTRAMUSCULAR; INTRAVENOUS at 08:00

## 2018-12-18 RX ADMIN — SODIUM CHLORIDE 8 UNITS/HR: 9 INJECTION, SOLUTION INTRAVENOUS at 19:11

## 2018-12-18 ASSESSMENT — ENCOUNTER SYMPTOMS
PALPITATIONS: 0
FEVER: 0
COUGH: 0
WHEEZING: 0
NECK PAIN: 0
FATIGUE: 1
SEIZURES: 0
VOMITING: 0
UNEXPECTED WEIGHT CHANGE: 0
SPEECH CHANGE: 0
ABDOMINAL PAIN: 1
FLANK PAIN: 1
CHILLS: 0
SPEECH DIFFICULTY: 0
NAUSEA: 1
NUMBNESS: 0
SORE THROAT: 0
DIARRHEA: 0
FOCAL WEAKNESS: 0
VOMITING: 1
ARTHRALGIAS: 0
MYALGIAS: 0
SENSORY CHANGE: 0
HEADACHES: 1
TROUBLE SWALLOWING: 0
VOICE CHANGE: 0
CHILLS: 1
CONFUSION: 0
SHORTNESS OF BREATH: 0
WEAKNESS: 1
BACK PAIN: 1
CONSTIPATION: 0
POLYDIPSIA: 1

## 2018-12-18 ASSESSMENT — PAIN SCALES - GENERAL
PAINLEVEL_OUTOF10: 5
PAINLEVEL_OUTOF10: 9
PAINLEVEL_OUTOF10: 9
PAINLEVEL_OUTOF10: 6
PAINLEVEL_OUTOF10: 10
PAINLEVEL_OUTOF10: 8
PAINLEVEL_OUTOF10: 5
PAINLEVEL_OUTOF10: 6
PAINLEVEL_OUTOF10: 9
PAINLEVEL_OUTOF10: 8
PAINLEVEL_OUTOF10: 4

## 2018-12-18 NOTE — ED NOTES
Med Rec complete per Pt and Pt's Mother at bedside   Ok per Pt to discuss medications with visitor/s present    Allergies Reviewed  No ABX in the last 30 days

## 2018-12-18 NOTE — ED TRIAGE NOTES
Chief Complaint   Patient presents with   • N/V     onset this morning   • Flank Pain     left x2days     Pt wheeled to triage, mother answering question. Pt has history of gastropheresis and diabetic type 1 . fsbs 321.   Vomit appears dark brow in color.

## 2018-12-18 NOTE — PROCEDURES
Central Line Insertion  Date/Time: 12/18/2018 1:40 PM  Performed by: MATT CARMONA JR  Authorized by: MATT CARMONA JR     Consent:     Consent obtained:  Verbal    Consent given by:  Patient  Pre-procedure details:     Hand hygiene: Hand hygiene performed prior to insertion      Sterile barrier technique: All elements of maximal sterile technique followed      Skin preparation:  2% chlorhexidine    Skin preparation agent: Skin preparation agent completely dried prior to procedure    Sedation:     Sedation type:  None  Anesthesia:     Anesthesia method:  Local infiltration    Local anesthetic:  Lidocaine 1% w/o epi  Procedure details:     Location:  R internal jugular    Site selection rationale:  US guidance    Patient position:  Trendelenburg    Procedural supplies:  Triple lumen    Catheter size:  7.5 Fr    Landmarks identified: yes      Ultrasound guidance: yes      Sterile ultrasound techniques: Sterile gel and sterile probe covers were used      Number of attempts:  1    Successful placement: yes    Post-procedure details:     Post-procedure:  Dressing applied and line sutured    Assessment:  Blood return through all ports, no pneumothorax on x-ray, placement verified by x-ray and free fluid flow    Patient tolerance of procedure:  Tolerated well, no immediate complications

## 2018-12-18 NOTE — PROGRESS NOTES
1215: Bedside report received by receiving RNYessenia. Patient transported from [facility] 25 to R110, on monitor via ACLS RN and CNA. Dr. Alan at bedside at 1220.

## 2018-12-18 NOTE — ED PROVIDER NOTES
ED Provider Note    Scribed for Kostas Landin M.D. by Lul Pagan. 12/18/2018  7:30 AM    Primary care provider: Navi Huber M.D.  Means of arrival: Walk in  History obtained from: Patient  History limited by: None    CHIEF COMPLAINT  Chief Complaint   Patient presents with   • N/V     onset this morning   • Flank Pain     left x2days       HPI  Alis Sparks is a 28 y.o. female who presents to the Emergency Department complaining of nausea that first began 2 weeks ago and vomiting that began this morning. Patient states she has had the chills, but denies any fever. She also has upper middle and left back pain. Patient has a history of diabetes and reports taking her insulin and checking her BGL regularly. Alis denies any abdominal pain, runny nose, cough, or dysuria. Her last menstrual period was at the end of November and she denies the chance of pregnancy. She reports when she usually gets nauseous Reglan is able to resolve her symptoms.      REVIEW OF SYSTEMS  Pertinent positives include nausea, vomiting, chills, upper left and mid back pain. Pertinent negatives include no fever, abdominal pain, runny nose, cough, or dysuria.  All other systems reviewed and negative.    PAST MEDICAL HISTORY   has a past medical history of Backpain; Bronchitis; Diabetes type 1, controlled ([facility]); DKA (diabetic ketoacidoses) ([facility]); Fall; Gastroparesis; Kidney infection; Pneumonia; and UTI (urinary tract infection).    SURGICAL HISTORY   has a past surgical history that includes gastroscopy-endo (9/18/2014); gastroscopy with biopsy (9/18/2014); other; submandible abscess incision and drainage (Left, 11/8/2017); dental extraction(s) (11/8/2017); and gastroscopy-endo (N/A, 6/9/2018).    SOCIAL HISTORY  Social History   Substance Use Topics   • Smoking status: Never Smoker   • Smokeless tobacco: Never Used   • Alcohol use No      History   Drug Use No       FAMILY HISTORY  Family History   Problem  "Relation Age of Onset   • Cancer Unknown         breasts, multiple family members   • Hypertension Maternal Grandfather        CURRENT MEDICATIONS  Home Medications     Reviewed by Blade Brice (Pharmacy Tech) on 12/18/18 at 1102  Med List Status: Complete   Medication Last Dose Status   atorvastatin (LIPITOR) 20 MG Tab 12/17/2018 Active   Cholecalciferol 2000 UNIT Cap 12/17/2018 Active   ferrous sulfate 325 (65 Fe) MG tablet 12/17/2018 Active   Insulin Glargine (BASAGLAR KWIKPEN) 100 UNIT/ML Solution Pen-injector 12/17/2018 Active   insulin glulisine (APIDRA) 100 UNIT/ML Solution Pen-injector injection 12/17/2018 Active   metoclopramide (REGLAN) 10 MG/10ML Solution 12/17/2018 Active   Omega-3 Fatty Acids (OMEGA 3 PO) 12/17/2018 Active                ALLERGIES  Allergies   Allergen Reactions   • Pcn [Penicillins] Shortness of Breath and Swelling     Per patient's Mom, patient tolerates Keflex   • Lisinopril Unspecified     Per historical: Reported pedal swelling. No facial/angioedema or rash nor respiratory symptoms.    • Promethazine Vomiting       PHYSICAL EXAM  VITAL SIGNS: /77   Pulse (!) 110   Temp 35.9 °C (96.6 °F) (Temporal)   Resp (!) 26   Ht 1.651 m (5' 5\")   Wt 81.6 kg (180 lb)   LMP 11/18/2018   SpO2 98%   BMI 29.95 kg/m²     Constitutional: Well developed, Well nourished, Moderate distress, Non-toxic appearance.   HENT: Normocephalic, Atraumatic, Bilateral external ears normal, dry mucous membranes, No oral exudates.   Eyes: PERRLA, EOMI, Conjunctiva normal, No discharge.   Neck: No tenderness, Supple, No stridor.   Lymphatic: No lymphadenopathy noted.   Cardiovascular: Tachycardic, Normal rhythm.   Thorax & Lungs: Clear to auscultation bilaterally, Kussmaul breathing, No wheezing, No crackles.   Abdomen: Soft, No tenderness, No masses, No pulsatile masses.   Skin: Warm, Dry, No erythema, No rash.   Extremities:, No edema No cyanosis.   Musculoskeletal: No tenderness to palpation " or major deformities noted.  Intact distal pulses  Neurologic: Awake, alert. Moves all extremities spontaneously.  Psychiatric: Affect normal, Judgment normal, Mood normal.     LABS  Labs Reviewed   CBC WITH DIFFERENTIAL - Abnormal; Notable for the following:        Result Value    WBC 15.8 (*)     RBC 5.75 (*)     Hemoglobin 16.6 (*)     Hematocrit 51.8 (*)     MCHC 32.0 (*)     Neutrophils-Polys 73.40 (*)     Lymphocytes 17.80 (*)     Immature Granulocytes 1.00 (*)     Neutrophils (Absolute) 11.60 (*)     Monos (Absolute) 1.11 (*)     Immature Granulocytes (abs) 0.16 (*)     All other components within normal limits   COMP METABOLIC PANEL - Abnormal; Notable for the following:     Co2 5 (*)     Anion Gap 25.0 (*)     Glucose 369 (*)     Bun 25 (*)     AST(SGOT) 11 (*)     Alkaline Phosphatase 116 (*)     All other components within normal limits   URINALYSIS,CULTURE IF INDICATED - Abnormal; Notable for the following:     Glucose >=1000 (*)     Protein 100 (*)     Occult Blood Trace (*)     All other components within normal limits   VENOUS BLOOD GAS - Abnormal; Notable for the following:     Venous Bg Ph 6.91 (*)     Venous Bg Pco2 15.8 (*)     Venous Bg Po2 55.3 (*)     Venous Bg Hco3 3 (*)     All other components within normal limits   BETA-HYDROXYBUTYRIC ACID - Abnormal; Notable for the following:     beta-Hydroxybutyric Acid >8.00 (*)     All other components within normal limits   ESTIMATED GFR - Abnormal; Notable for the following:     GFR If Non  55 (*)     All other components within normal limits   URINE MICROSCOPIC (W/UA) - Abnormal; Notable for the following:     RBC 10-20 (*)     Bacteria Few (*)     Hyaline Cast 3-5 (*)     All other components within normal limits   BASIC METABOLIC PANEL - Abnormal; Notable for the following:     Co2 <5 (*)     Glucose 389 (*)     Bun 25 (*)     Anion Gap 23.0 (*)     All other components within normal limits   PHOSPHORUS - Abnormal; Notable for the  following:     Phosphorus 4.6 (*)     All other components within normal limits   ESTIMATED GFR - Abnormal; Notable for the following:     GFR If Non  56 (*)     All other components within normal limits   ACCU-CHEK GLUCOSE - Abnormal; Notable for the following:     Glucose - Accu-Ck 321 (*)     All other components within normal limits   ACCU-CHEK GLUCOSE - Abnormal; Notable for the following:     Glucose - Accu-Ck 424 (*)     All other components within normal limits   ACCU-CHEK GLUCOSE - Abnormal; Notable for the following:     Glucose - Accu-Ck 394 (*)     All other components within normal limits   HCG QUAL SERUM   MAGNESIUM   MAGNESIUM   LIPASE   BASIC METABOLIC PANEL   BASIC METABOLIC PANEL   MAGNESIUM   MAGNESIUM   PHOSPHORUS   PHOSPHORUS   LIPASE     All labs reviewed by me.      COURSE & MEDICAL DECISION MAKING  Pertinent Labs & Imaging studies reviewed. (See chart for details)    I reviewed the patient's medical records which showed she has a history of diabetes, gastroparesis, and kidney infections. She was seen 6 weeks ago in DKA.    7:30 AM - Patient seen and examined at bedside. Patient will be treated with reglan 5mg, benadryl 25mg, zofran 4mg, sublimaze 50mcg, lactated ringer infusion secondary to patient's tachycardia, acute vomiting, and dry mucous membranes. Ordered HCG qual serum, CBC, CMP, UA, Mg, venous blood gas, beta-hydroxybutyric acid, accu-chek glucose to evaluate her symptoms. The differential diagnoses include but are not limited to: DKA, viral, dehydration.    10:18 AM - I spoke with the patient again and informed her that her insulin level is low and that she is in DKA. She states she did not take her insulin this morning. I informed her that she will need to be admitted for further treatment and evaluation. She understands and agrees to the plan of care.    10:23 AM - Paged [facility] for admission.    10:31 AM - Paged intensivist.    11:01 AM - Spoke with ETHEL stewart who  agrees to admit to ICU. They will follow up with Alis shortly.       CRITICAL CARE  The very real possibility of a deterioration of this patient's condition required the highest level of my preparedness for sudden, emergent intervention.  I provided critical care services, which included medication orders, frequent reevaluations of the patient's condition and response to treatment, ordering and reviewing test results, and discussing the case with various consultants.  The critical care time associated with the care of the patient was 35 minutes. Review chart for interventions. This time is exclusive of any other billable procedures.     Decision Making:  Patient is coming in secondary to what appears to be diabetic ketoacidosis, fluid resuscitate the patient with multiple liters lactated Ringer also once laboratory tests came back started an insulin infusion.  Give the patient Reglan, Benadryl for her nausea vomiting.  Discussed the case with intensivist and the hospitalist for admission the hospital.    HYDRATION: Based on the patient's presentation of Acute Vomiting and Tachycardia the patient was given IV fluids. IV Hydration was used because oral hydration was not adequate alone. Upon recheck following hydration, the patient was Slight improvement.    DISPOSITION:  Patient will be admitted to [facility] in critical condition.    Total critical care time of 35 minutes as noted above.      FINAL IMPRESSION  1. Diabetic ketoacidosis with coma associated with type 1 diabetes mellitus (HCC)    2. DM (diabetes mellitus) type 1, uncontrolled, with ketoacidosis (HCC)          Lul BRAMBILA (Scribana laura), am scribing for, and in the presence of, Kostas Landin M.D..    Electronically signed by: Lul Pagan (Annia), 12/18/2018    Kostas BRAMBILA M.D. personally performed the services described in this documentation, as scribed by Lul Pagan in my presence, and it is both accurate and complete.  C.    The note accurately reflects work and decisions made by me.  Kostas Landin  12/18/2018  2:46 PM

## 2018-12-18 NOTE — PROGRESS NOTES
Late entry: Pt to R110 via gurclyde with mother at bedside at 1215. Pt oriented x4. Kussmaul respirations noted.

## 2018-12-18 NOTE — ED NOTES
Dean from Lab called with critical result of VBG Ph 6.91 at 1030. Critical lab result read back to Dean.   Dr. Landin notified of critical lab result at 1030.  Critical lab result read back by Dr. Landin.

## 2018-12-19 LAB
ALBUMIN SERPL BCP-MCNC: 3.1 G/DL (ref 3.2–4.9)
ALBUMIN/GLOB SERPL: 1.8 G/DL
ALP SERPL-CCNC: 64 U/L (ref 30–99)
ALT SERPL-CCNC: 10 U/L (ref 2–50)
ANION GAP SERPL CALC-SCNC: 4 MMOL/L (ref 0–11.9)
ANION GAP SERPL CALC-SCNC: 5 MMOL/L (ref 0–11.9)
ANION GAP SERPL CALC-SCNC: 5 MMOL/L (ref 0–11.9)
AST SERPL-CCNC: 10 U/L (ref 12–45)
BASOPHILS # BLD AUTO: 0.5 % (ref 0–1.8)
BASOPHILS # BLD: 0.05 K/UL (ref 0–0.12)
BILIRUB SERPL-MCNC: 0.3 MG/DL (ref 0.1–1.5)
BUN SERPL-MCNC: 13 MG/DL (ref 8–22)
BUN SERPL-MCNC: 14 MG/DL (ref 8–22)
BUN SERPL-MCNC: 15 MG/DL (ref 8–22)
CALCIUM SERPL-MCNC: 7.4 MG/DL (ref 8.5–10.5)
CALCIUM SERPL-MCNC: 7.6 MG/DL (ref 8.5–10.5)
CALCIUM SERPL-MCNC: 7.8 MG/DL (ref 8.5–10.5)
CHLORIDE SERPL-SCNC: 119 MMOL/L (ref 96–112)
CHLORIDE SERPL-SCNC: 120 MMOL/L (ref 96–112)
CHLORIDE SERPL-SCNC: 121 MMOL/L (ref 96–112)
CO2 SERPL-SCNC: 22 MMOL/L (ref 20–33)
CO2 SERPL-SCNC: 22 MMOL/L (ref 20–33)
CO2 SERPL-SCNC: 24 MMOL/L (ref 20–33)
CREAT SERPL-MCNC: 0.51 MG/DL (ref 0.5–1.4)
CREAT SERPL-MCNC: 0.51 MG/DL (ref 0.5–1.4)
CREAT SERPL-MCNC: 0.54 MG/DL (ref 0.5–1.4)
EKG IMPRESSION: NORMAL
EOSINOPHIL # BLD AUTO: 0.02 K/UL (ref 0–0.51)
EOSINOPHIL NFR BLD: 0.2 % (ref 0–6.9)
ERYTHROCYTE [DISTWIDTH] IN BLOOD BY AUTOMATED COUNT: 42.4 FL (ref 35.9–50)
GLOBULIN SER CALC-MCNC: 1.7 G/DL (ref 1.9–3.5)
GLUCOSE BLD-MCNC: 102 MG/DL (ref 65–99)
GLUCOSE BLD-MCNC: 109 MG/DL (ref 65–99)
GLUCOSE BLD-MCNC: 113 MG/DL (ref 65–99)
GLUCOSE BLD-MCNC: 125 MG/DL (ref 65–99)
GLUCOSE BLD-MCNC: 141 MG/DL (ref 65–99)
GLUCOSE BLD-MCNC: 143 MG/DL (ref 65–99)
GLUCOSE BLD-MCNC: 144 MG/DL (ref 65–99)
GLUCOSE BLD-MCNC: 146 MG/DL (ref 65–99)
GLUCOSE BLD-MCNC: 147 MG/DL (ref 65–99)
GLUCOSE BLD-MCNC: 153 MG/DL (ref 65–99)
GLUCOSE BLD-MCNC: 156 MG/DL (ref 65–99)
GLUCOSE BLD-MCNC: 165 MG/DL (ref 65–99)
GLUCOSE BLD-MCNC: 165 MG/DL (ref 65–99)
GLUCOSE BLD-MCNC: 167 MG/DL (ref 65–99)
GLUCOSE BLD-MCNC: 71 MG/DL (ref 65–99)
GLUCOSE BLD-MCNC: 79 MG/DL (ref 65–99)
GLUCOSE BLD-MCNC: 81 MG/DL (ref 65–99)
GLUCOSE BLD-MCNC: 89 MG/DL (ref 65–99)
GLUCOSE BLD-MCNC: 97 MG/DL (ref 65–99)
GLUCOSE SERPL-MCNC: 110 MG/DL (ref 65–99)
GLUCOSE SERPL-MCNC: 128 MG/DL (ref 65–99)
GLUCOSE SERPL-MCNC: 152 MG/DL (ref 65–99)
HCT VFR BLD AUTO: 35.4 % (ref 37–47)
HGB BLD-MCNC: 12.1 G/DL (ref 12–16)
IMM GRANULOCYTES # BLD AUTO: 0.04 K/UL (ref 0–0.11)
IMM GRANULOCYTES NFR BLD AUTO: 0.4 % (ref 0–0.9)
LYMPHOCYTES # BLD AUTO: 1.71 K/UL (ref 1–4.8)
LYMPHOCYTES NFR BLD: 17.9 % (ref 22–41)
MAGNESIUM SERPL-MCNC: 1.9 MG/DL (ref 1.5–2.5)
MAGNESIUM SERPL-MCNC: 2.6 MG/DL (ref 1.5–2.5)
MCH RBC QN AUTO: 29.2 PG (ref 27–33)
MCHC RBC AUTO-ENTMCNC: 34.2 G/DL (ref 33.6–35)
MCV RBC AUTO: 85.3 FL (ref 81.4–97.8)
MONOCYTES # BLD AUTO: 0.93 K/UL (ref 0–0.85)
MONOCYTES NFR BLD AUTO: 9.7 % (ref 0–13.4)
NEUTROPHILS # BLD AUTO: 6.8 K/UL (ref 2–7.15)
NEUTROPHILS NFR BLD: 71.3 % (ref 44–72)
NRBC # BLD AUTO: 0 K/UL
NRBC BLD-RTO: 0 /100 WBC
PHOSPHATE SERPL-MCNC: 1.3 MG/DL (ref 2.5–4.5)
PHOSPHATE SERPL-MCNC: 1.9 MG/DL (ref 2.5–4.5)
PLATELET # BLD AUTO: 206 K/UL (ref 164–446)
PMV BLD AUTO: 10.1 FL (ref 9–12.9)
POTASSIUM SERPL-SCNC: 2.7 MMOL/L (ref 3.6–5.5)
POTASSIUM SERPL-SCNC: 3.2 MMOL/L (ref 3.6–5.5)
POTASSIUM SERPL-SCNC: 3.8 MMOL/L (ref 3.6–5.5)
PROT SERPL-MCNC: 4.8 G/DL (ref 6–8.2)
RBC # BLD AUTO: 4.15 M/UL (ref 4.2–5.4)
SODIUM SERPL-SCNC: 147 MMOL/L (ref 135–145)
SODIUM SERPL-SCNC: 147 MMOL/L (ref 135–145)
SODIUM SERPL-SCNC: 148 MMOL/L (ref 135–145)
WBC # BLD AUTO: 9.6 K/UL (ref 4.8–10.8)

## 2018-12-19 PROCEDURE — 85025 COMPLETE CBC W/AUTO DIFF WBC: CPT

## 2018-12-19 PROCEDURE — 700101 HCHG RX REV CODE 250: Performed by: INTERNAL MEDICINE

## 2018-12-19 PROCEDURE — 84100 ASSAY OF PHOSPHORUS: CPT

## 2018-12-19 PROCEDURE — 700102 HCHG RX REV CODE 250 W/ 637 OVERRIDE(OP): Performed by: INTERNAL MEDICINE

## 2018-12-19 PROCEDURE — 700111 HCHG RX REV CODE 636 W/ 250 OVERRIDE (IP): Performed by: INTERNAL MEDICINE

## 2018-12-19 PROCEDURE — 51798 US URINE CAPACITY MEASURE: CPT

## 2018-12-19 PROCEDURE — 700105 HCHG RX REV CODE 258: Performed by: INTERNAL MEDICINE

## 2018-12-19 PROCEDURE — 700102 HCHG RX REV CODE 250 W/ 637 OVERRIDE(OP): Performed by: STUDENT IN AN ORGANIZED HEALTH CARE EDUCATION/TRAINING PROGRAM

## 2018-12-19 PROCEDURE — 80048 BASIC METABOLIC PNL TOTAL CA: CPT

## 2018-12-19 PROCEDURE — 80053 COMPREHEN METABOLIC PANEL: CPT

## 2018-12-19 PROCEDURE — 83036 HEMOGLOBIN GLYCOSYLATED A1C: CPT

## 2018-12-19 PROCEDURE — 99291 CRITICAL CARE FIRST HOUR: CPT | Performed by: INTERNAL MEDICINE

## 2018-12-19 PROCEDURE — 700105 HCHG RX REV CODE 258: Performed by: STUDENT IN AN ORGANIZED HEALTH CARE EDUCATION/TRAINING PROGRAM

## 2018-12-19 PROCEDURE — 770022 HCHG ROOM/CARE - ICU (200)

## 2018-12-19 PROCEDURE — 83735 ASSAY OF MAGNESIUM: CPT

## 2018-12-19 PROCEDURE — 700111 HCHG RX REV CODE 636 W/ 250 OVERRIDE (IP): Performed by: STUDENT IN AN ORGANIZED HEALTH CARE EDUCATION/TRAINING PROGRAM

## 2018-12-19 PROCEDURE — 82962 GLUCOSE BLOOD TEST: CPT

## 2018-12-19 RX ORDER — SODIUM CHLORIDE, SODIUM LACTATE, POTASSIUM CHLORIDE, CALCIUM CHLORIDE 600; 310; 30; 20 MG/100ML; MG/100ML; MG/100ML; MG/100ML
INJECTION, SOLUTION INTRAVENOUS CONTINUOUS
Status: DISCONTINUED | OUTPATIENT
Start: 2018-12-19 | End: 2018-12-23

## 2018-12-19 RX ORDER — POTASSIUM CHLORIDE 14.9 MG/ML
20 INJECTION INTRAVENOUS ONCE
Status: COMPLETED | OUTPATIENT
Start: 2018-12-19 | End: 2018-12-19

## 2018-12-19 RX ORDER — INSULIN GLARGINE 100 [IU]/ML
20 INJECTION, SOLUTION SUBCUTANEOUS ONCE
Status: COMPLETED | OUTPATIENT
Start: 2018-12-19 | End: 2018-12-19

## 2018-12-19 RX ORDER — POTASSIUM CHLORIDE 29.8 MG/ML
40 INJECTION INTRAVENOUS ONCE
Status: COMPLETED | OUTPATIENT
Start: 2018-12-19 | End: 2018-12-19

## 2018-12-19 RX ORDER — MAGNESIUM SULFATE HEPTAHYDRATE 40 MG/ML
2 INJECTION, SOLUTION INTRAVENOUS ONCE
Status: DISCONTINUED | OUTPATIENT
Start: 2018-12-19 | End: 2018-12-19

## 2018-12-19 RX ORDER — DEXTROSE MONOHYDRATE 25 G/50ML
25 INJECTION, SOLUTION INTRAVENOUS
Status: DISCONTINUED | OUTPATIENT
Start: 2018-12-19 | End: 2018-12-19

## 2018-12-19 RX ORDER — METOCLOPRAMIDE HYDROCHLORIDE 5 MG/ML
10 INJECTION INTRAMUSCULAR; INTRAVENOUS EVERY 6 HOURS
Status: DISCONTINUED | OUTPATIENT
Start: 2018-12-19 | End: 2018-12-26

## 2018-12-19 RX ORDER — DEXTROSE MONOHYDRATE 25 G/50ML
25 INJECTION, SOLUTION INTRAVENOUS
Status: DISCONTINUED | OUTPATIENT
Start: 2018-12-19 | End: 2018-12-24

## 2018-12-19 RX ORDER — MAGNESIUM SULFATE HEPTAHYDRATE 40 MG/ML
2 INJECTION, SOLUTION INTRAVENOUS ONCE
Status: COMPLETED | OUTPATIENT
Start: 2018-12-19 | End: 2018-12-19

## 2018-12-19 RX ORDER — INSULIN GLARGINE 100 [IU]/ML
32 INJECTION, SOLUTION SUBCUTANEOUS 2 TIMES DAILY
Status: DISCONTINUED | OUTPATIENT
Start: 2018-12-19 | End: 2018-12-19

## 2018-12-19 RX ADMIN — METOCLOPRAMIDE 10 MG: 5 INJECTION, SOLUTION INTRAMUSCULAR; INTRAVENOUS at 11:06

## 2018-12-19 RX ADMIN — POTASSIUM CHLORIDE 40 MEQ: 400 INJECTION, SOLUTION INTRAVENOUS at 02:36

## 2018-12-19 RX ADMIN — ONDANSETRON 4 MG: 2 INJECTION INTRAMUSCULAR; INTRAVENOUS at 12:42

## 2018-12-19 RX ADMIN — METOCLOPRAMIDE 10 MG: 5 INJECTION, SOLUTION INTRAMUSCULAR; INTRAVENOUS at 17:49

## 2018-12-19 RX ADMIN — SODIUM CHLORIDE, POTASSIUM CHLORIDE, SODIUM LACTATE AND CALCIUM CHLORIDE: 600; 310; 30; 20 INJECTION, SOLUTION INTRAVENOUS at 14:00

## 2018-12-19 RX ADMIN — HYDROMORPHONE HYDROCHLORIDE 0.5 MG: 1 INJECTION, SOLUTION INTRAMUSCULAR; INTRAVENOUS; SUBCUTANEOUS at 00:06

## 2018-12-19 RX ADMIN — ONDANSETRON 4 MG: 2 INJECTION INTRAMUSCULAR; INTRAVENOUS at 17:49

## 2018-12-19 RX ADMIN — SODIUM CHLORIDE 8 UNITS/HR: 9 INJECTION, SOLUTION INTRAVENOUS at 03:22

## 2018-12-19 RX ADMIN — INSULIN GLARGINE 20 UNITS: 100 INJECTION, SOLUTION SUBCUTANEOUS at 12:40

## 2018-12-19 RX ADMIN — POTASSIUM CHLORIDE 40 MEQ: 400 INJECTION, SOLUTION INTRAVENOUS at 08:40

## 2018-12-19 RX ADMIN — FAMOTIDINE 20 MG: 10 INJECTION INTRAVENOUS at 11:07

## 2018-12-19 RX ADMIN — FAMOTIDINE 20 MG: 10 INJECTION INTRAVENOUS at 17:49

## 2018-12-19 RX ADMIN — METOCLOPRAMIDE 10 MG: 5 INJECTION, SOLUTION INTRAMUSCULAR; INTRAVENOUS at 06:40

## 2018-12-19 RX ADMIN — SODIUM CHLORIDE, POTASSIUM CHLORIDE, SODIUM LACTATE AND CALCIUM CHLORIDE: 600; 310; 30; 20 INJECTION, SOLUTION INTRAVENOUS at 22:00

## 2018-12-19 RX ADMIN — PROCHLORPERAZINE EDISYLATE 10 MG: 5 INJECTION INTRAMUSCULAR; INTRAVENOUS at 02:43

## 2018-12-19 RX ADMIN — HYDROMORPHONE HYDROCHLORIDE 0.5 MG: 1 INJECTION, SOLUTION INTRAMUSCULAR; INTRAVENOUS; SUBCUTANEOUS at 21:10

## 2018-12-19 RX ADMIN — PROCHLORPERAZINE EDISYLATE 10 MG: 5 INJECTION INTRAMUSCULAR; INTRAVENOUS at 09:21

## 2018-12-19 RX ADMIN — POTASSIUM PHOSPHATE, MONOBASIC AND POTASSIUM PHOSPHATE, DIBASIC 30 MMOL: 224; 236 INJECTION, SOLUTION INTRAVENOUS at 02:42

## 2018-12-19 RX ADMIN — MAGNESIUM SULFATE IN WATER 2 G: 40 INJECTION, SOLUTION INTRAVENOUS at 02:36

## 2018-12-19 RX ADMIN — HYDROMORPHONE HYDROCHLORIDE 0.5 MG: 1 INJECTION, SOLUTION INTRAMUSCULAR; INTRAVENOUS; SUBCUTANEOUS at 09:21

## 2018-12-19 RX ADMIN — METOCLOPRAMIDE 10 MG: 5 INJECTION, SOLUTION INTRAMUSCULAR; INTRAVENOUS at 23:49

## 2018-12-19 RX ADMIN — DEXTROSE AND SODIUM CHLORIDE: 10; .45 INJECTION, SOLUTION INTRAVENOUS at 07:02

## 2018-12-19 RX ADMIN — PROCHLORPERAZINE EDISYLATE 10 MG: 5 INJECTION INTRAMUSCULAR; INTRAVENOUS at 15:52

## 2018-12-19 RX ADMIN — ENOXAPARIN SODIUM 40 MG: 100 INJECTION SUBCUTANEOUS at 11:07

## 2018-12-19 RX ADMIN — HYDROMORPHONE HYDROCHLORIDE 0.5 MG: 1 INJECTION, SOLUTION INTRAMUSCULAR; INTRAVENOUS; SUBCUTANEOUS at 15:51

## 2018-12-19 RX ADMIN — POTASSIUM CHLORIDE 20 MEQ: 14.9 INJECTION, SOLUTION INTRAVENOUS at 12:52

## 2018-12-19 ASSESSMENT — PAIN SCALES - GENERAL
PAINLEVEL_OUTOF10: 5
PAINLEVEL_OUTOF10: 9
PAINLEVEL_OUTOF10: 4
PAINLEVEL_OUTOF10: 9
PAINLEVEL_OUTOF10: 0
PAINLEVEL_OUTOF10: 6
PAINLEVEL_OUTOF10: 5
PAINLEVEL_OUTOF10: 8
PAINLEVEL_OUTOF10: 0
PAINLEVEL_OUTOF10: 0
PAINLEVEL_OUTOF10: 7
PAINLEVEL_OUTOF10: 6
PAINLEVEL_OUTOF10: 0
PAINLEVEL_OUTOF10: 4
PAINLEVEL_OUTOF10: 4

## 2018-12-19 ASSESSMENT — ENCOUNTER SYMPTOMS
CHILLS: 0
FEVER: 0
ABDOMINAL PAIN: 1
CONSTIPATION: 0
COUGH: 0
ABDOMINAL PAIN: 0
SPEECH CHANGE: 0
DIARRHEA: 0
WEAKNESS: 1
VOMITING: 1
DIZZINESS: 0
POLYDIPSIA: 1
SORE THROAT: 0
SENSORY CHANGE: 0
HEADACHES: 1
FLANK PAIN: 1
BACK PAIN: 1
NAUSEA: 1
MYALGIAS: 0
FOCAL WEAKNESS: 0
SHORTNESS OF BREATH: 0
SPUTUM PRODUCTION: 0
STRIDOR: 0

## 2018-12-19 ASSESSMENT — COGNITIVE AND FUNCTIONAL STATUS - GENERAL
DRESSING REGULAR UPPER BODY CLOTHING: A LITTLE
MOBILITY SCORE: 20
CLIMB 3 TO 5 STEPS WITH RAILING: A LITTLE
DRESSING REGULAR LOWER BODY CLOTHING: A LITTLE
STANDING UP FROM CHAIR USING ARMS: A LITTLE
WALKING IN HOSPITAL ROOM: A LITTLE
HELP NEEDED FOR BATHING: A LITTLE
MOVING FROM LYING ON BACK TO SITTING ON SIDE OF FLAT BED: A LITTLE
SUGGESTED CMS G CODE MODIFIER MOBILITY: CJ
TOILETING: A LITTLE
SUGGESTED CMS G CODE MODIFIER DAILY ACTIVITY: CK
EATING MEALS: A LITTLE
PERSONAL GROOMING: A LITTLE
DAILY ACTIVITIY SCORE: 18

## 2018-12-19 NOTE — PROGRESS NOTES
Pt's BS=66, rechecked and was 79.  Insulin gtt stopped and Dr Alan notified - ordered to keep insulin gtt off and recheck in 1 hour.

## 2018-12-19 NOTE — ASSESSMENT & PLAN NOTE
Likely due to her underlying gastroparesis compounded by diabetic ketoacidosis  Continue scheduled IV Reglan, continue Zofran as needed  Add scheduled Compazine and Benadryl

## 2018-12-19 NOTE — PROGRESS NOTES
Arnulfo from Lab called with critical result of phosphorus less than 1 at 1811. Critical lab result read back to Arnulfo.   Dr. Gonda notified of critical lab result at 1830.  Critical lab result read back by Dr. Gonda.

## 2018-12-19 NOTE — ASSESSMENT & PLAN NOTE
Poorly controlled  Hemoglobin A1c 11.8  Multiple complications of diabetes  Consider diabetes education  RD consult if needed

## 2018-12-19 NOTE — PROGRESS NOTES
Critical Care Medicine Admitting History and Physical    Note Author: Adrian Sanchez M.D.         Name Alis Sparks     1990   Age/Sex 28 y.o. female   MRN [id number]   Code Status Full      After 5PM or if no immediate response to page, please call for cross-coverage  Attending/Team: Dr Alan/José Manuel See Patient List for primary contact information  Call [phone] to page    1st Call - Day Intern (R1):   Daniel           Chief Complaint:  Nausea/Vomiting     HPI:              Pt is a 29 yo F presenting with severe N/V this morning. Pt has a PMH of DM1 (most recent Ha1c on 11/10 was 11.8; hx of peripheral neuropathy, gastroparesis, autonomic dysfunction, and nephropathy), inappropriate sinus tach, and medical noncompliance. Pt developed nausea ~2 weeks ago, however she has a hx of gastroparesis and was resistant to see a healthcare provider. Sometime in the recent past mother notes daughters insurance no longer covered her rapid acting insulin glulisine. Mother notes a near yearly history of DKA admissions since her diagnosis at age 17. Her symptoms culminated the morning of presentation with severe N/V when her mother prompted her to present to the ED.              In the ED pt was noted to have sugars in 400s and slight leukocytosis. Venous ABG showed pH 6.9. Beta hydroxybutyric acid was >8. Bicarb was <5 and AG was 25. Pt was given LR bolus and started on insulin drip. Pt was then admitted to the ICU for further mgmt of her DKA.    Interval Problem Daily Status Update  (24 hours)   -Gap closed overnight, bicarb wnl, insulin drip discontinued  -Started on sub-Q insulin via 20 units Lantus as pt cant tolerate diet yet  -Continue IVF via LR at 125  -Hypokalemic and hypophosphatemic overnight but now repleted to wnl and off insulin drip  -IV Zofran, Reglan, and compazine for N/V  -Attempt to advance to diabetic diet    Review of Systems   Constitutional: Positive for malaise/fatigue.  Negative for chills and fever.   HENT: Negative for congestion and sore throat.    Respiratory: Negative for cough and shortness of breath.    Cardiovascular: Negative for chest pain and leg swelling.   Gastrointestinal: Positive for abdominal pain, nausea and vomiting. Negative for constipation and diarrhea.   Genitourinary: Positive for flank pain and frequency. Negative for dysuria.   Musculoskeletal: Positive for back pain.   Neurological: Positive for weakness. Negative for sensory change, speech change and focal weakness.       Consultants/Specialty  Critical Care  PCP: Navi Huber M.D.    Disposition  Inpatient    Quality Measures  Quality-Core Measures   Reviewed items::  EKG reviewed, Labs reviewed, Radiology images reviewed and Medications reviewed  Pitt catheter::  No Pitt  DVT prophylaxis pharmacological::  Enoxaparin (Lovenox)      Physical Exam       Vitals:    12/19/18 0700 12/19/18 0800 12/19/18 0900 12/19/18 1000   BP:       Pulse: 100 94 91 95   Resp: 18 13 15 12   Temp:  36.6 °C (97.9 °F)     TempSrc:  Temporal     SpO2: 96% 94% 94% 94%   Weight:       Height:         Body mass index is 31.48 kg/m². Weight: 85.8 kg (189 lb 2.5 oz)  Oxygen Therapy:  Pulse Oximetry: 94 %, O2 (LPM): 0, O2 Delivery: None (Room Air)    Physical Exam   Constitutional: She is oriented to person, place, and time and well-developed, well-nourished, and in no distress.   HENT:   Head: Normocephalic and atraumatic.   Eyes: Pupils are equal, round, and reactive to light.   Cardiovascular: Regular rhythm.  Tachycardia present.    No murmur heard.  Pulmonary/Chest: Effort normal and breath sounds normal.   Abdominal: Soft. She exhibits no distension. There is tenderness.   Musculoskeletal: She exhibits no edema or tenderness.   Neurological: She is alert and oriented to person, place, and time. GCS score is 15.   Skin: Skin is warm and dry.         Lab Data Review:       12/19/2018  1:11 PM    Recent Labs       12/18/18   1700   12/19/18   0120  12/19/18   0516  12/19/18   1110   SODIUM  146*   < >  147*  148*  147*   POTASSIUM  3.3*   < >  2.7*  3.2*  3.8   CHLORIDE  119*   < >  120*  119*  121*   CO2  8*   < >  22  24  22   BUN  18   < >  15  14  13   CREATININE  0.65   < >  0.54  0.51  0.51   MAGNESIUM  1.4*   --   1.9  2.6*   --    PHOSPHORUS  <1.0*   --   1.3*  1.9*   --    CALCIUM  8.2*   < >  7.8*  7.6*  7.4*    < > = values in this interval not displayed.       Recent Labs      12/18/18   0750  12/18/18   1240  12/18/18   1700   12/19/18   0120  12/19/18   0516  12/19/18   1110   ALTSGPT  16   --    --    --   10   --    --    ASTSGOT  11*   --    --    --   10*   --    --    ALKPHOSPHAT  116*   --    --    --   64   --    --    TBILIRUBIN  0.4   --    --    --   0.3   --    --    LIPASE   --   15  32   --    --    --    --    GLUCOSE  369*  389*  169*   < >  152*  110*  128*    < > = values in this interval not displayed.       Recent Labs      12/18/18   0750  12/19/18   0516   RBC  5.75*  4.15*   HEMOGLOBIN  16.6*  12.1   HEMATOCRIT  51.8*  35.4*   PLATELETCT  303  206       Recent Labs      12/18/18   0750  12/19/18   0120  12/19/18   0516   WBC  15.8*   --   9.6   NEUTSPOLYS  73.40*   --   71.30   LYMPHOCYTES  17.80*   --   17.90*   MONOCYTES  7.00   --   9.70   EOSINOPHILS  0.20   --   0.20   BASOPHILS  0.60   --   0.50   ASTSGOT  11*  10*   --    ALTSGPT  16  10   --    ALKPHOSPHAT  116*  64   --    TBILIRUBIN  0.4  0.3   --            Assessment/Plan     Diabetic ketoacidosis (HCC)- (present on admission)   Assessment & Plan    -Likely 2/2 medical noncompliance  -Gap closed overnight, insulin drip DC'd  -Started on sub-Q insulin via 20 units Lantus given pt not tolerating diet  -Continue Q4H BMPs and Q8H mag/phos and replete appropriately -Continue IVF via LR at 125  -Continue Q1H accuchecks and hypoglycemia protocol   -CTM on tele with cardiac monitor     Intractable nausea and vomiting- (present on  admission)   Assessment & Plan    -Likely due to DKA and gastroparesis  -Continue compazine, Reglan, and zofran     Gastroparesis- (present on admission)   Assessment & Plan    -Chronic sequela of uncontrolled DM1  -Continue compazine, Reglan, and zofran  -Consider agent other than Reglan on discharge given long term given neurological risk in young pt      Diabetes type I (CMS-HCC)- (present on admission)   Assessment & Plan    -Poor control, most recent HA1c 11.8  -Mother says possibly ran out of rapid acting insulin  -Multiple previous DKA admissions  -Diabetic counseling via diabetic nurse  -Outpt endo f/u     Leukocytosis- (present on admission)   Assessment & Plan    -UA and CXR not suggestive of infxn  -Foot and oral exam show no obvious focus of infxn  -Pt denies any recent illness, wounds, or suggestive symptoms  -Resolved with IVF as did Cre/BUN and Hgb/Plt, likely hemoconcentrated  -CTM with low threshold to start abx     Flank pain- (present on admission)   Assessment & Plan    -Notes L flank and CVA pain  -UA not suggestive of infxn, renal US shows normal L kidney and R kidney with mild hydronephrosis?  -CTM I/Os closely   -Tylenol and Dilaudid for pain

## 2018-12-19 NOTE — ASSESSMENT & PLAN NOTE
UA unremarkable  Lipase normal  Renal US with moderate right hydro  CT negative for renal etiology  Improved

## 2018-12-19 NOTE — H&P
Critical Care Medicine Admitting History and Physical    Note Author: Adrian Sanchez M.D.       Name Alis Sparks     1990   Age/Sex 28 y.o. female   MRN [id number]   Code Status Full     After 5PM or if no immediate response to page, please call for cross-coverage  Attending/Team: Dr Alan/José Manuel See Patient List for primary contact information  Call [phone] to page    1st Call - Day Intern (R1):   Daniel        Chief Complaint:  Nausea/Vomiting    HPI:   Pt is a 27 yo F presenting with severe N/V this morning. Pt has a PMH of DM1 (most recent Ha1c on 11/10 was 11.8; hx of peripheral neuropathy, gastroparesis, autonomic dysfunction, and nephropathy), inappropriate sinus tach, and medical noncompliance. Pt was poor historian due to abdominal pain, much of history provided by mother. Mother notes pt developed nausea ~2 weeks ago, however she has a hx of gastroparesis and was resistant to see a healthcare provider. Sometime in the recent past mother notes daughters insurance no longer covered her rapid acting insulin glulisine but she is unclear if her daughter ran out, daughter would not answer when prompted. Mother notes a near yearly history of DKA admissions since her diagnosis at age 17. Her symptoms culminated the morning of presentation with severe N/V when her mother prompted her to present to the ED.   In the ED pt was noted to have sugars in 400s and slight leukocytosis. Venous ABG showed pH 6.9. Beta hydroxybutyric acid was >8. Bicarb was <5 and AG was 25. Pt was given LR bolus and started on insulin drip. Pt was then admitted to the ICU for further mgmt of her DKA.      Review of Systems   Constitutional: Positive for chills and malaise/fatigue. Negative for fever.   HENT: Negative for congestion and sore throat.    Respiratory: Negative for cough and shortness of breath.    Cardiovascular: Negative for chest pain, palpitations and leg swelling.   Gastrointestinal:  Positive for abdominal pain, nausea and vomiting. Negative for constipation and diarrhea.   Genitourinary: Positive for flank pain and frequency. Negative for dysuria.   Musculoskeletal: Positive for back pain.   Neurological: Positive for weakness and headaches. Negative for sensory change, speech change and focal weakness.             Past Medical History:   Past Medical History:   Diagnosis Date   • Backpain    • Bronchitis    • Diabetes type 1, controlled ([facility])     tests 4-5 times daily   • DKA (diabetic ketoacidoses) ([facility])    • Fall    • Gastroparesis    • Kidney infection    • Pneumonia    • UTI (urinary tract infection)        Past Surgical History:  Past Surgical History:   Procedure Laterality Date   • GASTROSCOPY-ENDO N/A 6/9/2018    Procedure: GASTROSCOPY-ENDO WITH BIOPSIES AND DIALATION;  Surgeon: Marino Black M.D.;  Location: SURGERY [facility];  Service: Gastroenterology   • SUBMANDIBLE ABSCESS INCISION AND DRAINAGE Left 11/8/2017    Procedure: SUBMANDIBLE ABSCESS INCISION AND DRAINAGE;  Surgeon: Osman Young M.D.;  Location: SURGERY [facility];  Service: Dental   • DENTAL EXTRACTION(S)  11/8/2017    Procedure: DENTAL EXTRACTION(S);  Surgeon: Osman Young M.D.;  Location: SURGERY [facility];  Service: Dental   • GASTROSCOPY-ENDO  9/18/2014    Performed by Sylvain PERRY M.D. at ENDOSCOPY [facility]   • GASTROSCOPY WITH BIOPSY  9/18/2014    Performed by Sylvain PERRY M.D. at ENDOSCOPY [facility]   • OTHER      surgery to patch lung after pneumor from central line placement       Current Outpatient Medications:  Home Medications     Reviewed by Blade Brice (Pharmacy Tech) on 12/18/18 at 1102  Med List Status: Complete   Medication Last Dose Status   atorvastatin (LIPITOR) 20 MG Tab 12/17/2018 Active   Cholecalciferol 2000 UNIT Cap 12/17/2018 Active   ferrous sulfate 325 (65 Fe) MG tablet 12/17/2018 Active   Insulin Glargine (BASAGLAR KWIKPEN) 100 UNIT/ML  "Solution Pen-injector 12/17/2018 Active   insulin glulisine (APIDRA) 100 UNIT/ML Solution Pen-injector injection 12/17/2018 Active   metoclopramide (REGLAN) 10 MG/10ML Solution 12/17/2018 Active   Omega-3 Fatty Acids (OMEGA 3 PO) 12/17/2018 Active                Medication Allergy/Sensitivities:  Allergies   Allergen Reactions   • Pcn [Penicillins] Shortness of Breath and Swelling     Per patient's Mom, patient tolerates Keflex   • Lisinopril Unspecified     Per historical: Reported pedal swelling. No facial/angioedema or rash nor respiratory symptoms.    • Promethazine Vomiting         Family History:  Family History   Problem Relation Age of Onset   • Cancer Unknown         breasts, multiple family members   • Hypertension Maternal Grandfather        Social History:  Social History     Social History   • Marital status: Single     Spouse name: N/A   • Number of children: N/A   • Years of education: N/A     Occupational History   • Not on file.     Social History Main Topics   • Smoking status: Never Smoker   • Smokeless tobacco: Never Used   • Alcohol use No   • Drug use: No   • Sexual activity: No     Other Topics Concern   • Not on file     Social History Narrative   • No narrative on file     Living situation: home with mother  PCP : Navi Huber M.D.      Physical Exam     Vitals:    12/18/18 1400 12/18/18 1500 12/18/18 1518 12/18/18 1600   BP:       Pulse: (!) 152 (!) 162  (!) 140   Resp: (!) 27 (!) 33  19   Temp: 36.2 °C (97.2 °F)   37.3 °C (99.1 °F)   TempSrc: Temporal   Temporal   SpO2: 100%  99% 99%   Weight:       Height:         Body mass index is 29.95 kg/m².  /69   Pulse (!) 140   Temp 37.3 °C (99.1 °F) (Temporal)   Resp 19   Ht 1.651 m (5' 5\")   Wt 81.6 kg (180 lb)   LMP 11/18/2018   SpO2 99%   BMI 29.95 kg/m²   O2 therapy: Pulse Oximetry: 99 %, O2 Delivery: None (Room Air)    Physical Exam   Constitutional: She is oriented to person, place, and time and well-developed, " well-nourished, and in no distress.   HENT:   Head: Normocephalic and atraumatic.   Eyes: Pupils are equal, round, and reactive to light.   Cardiovascular: Regular rhythm.  Tachycardia present.    No murmur heard.  Pulmonary/Chest: Breath sounds normal. Tachypnea noted. She is in respiratory distress.   Abdominal: Soft. She exhibits no distension. There is tenderness. There is CVA tenderness.   Musculoskeletal: She exhibits no edema or tenderness.   Neurological: She is alert and oriented to person, place, and time. GCS score is 15.   Skin: Skin is warm and dry.       Data Review       Old Records Request:   Completed  Current Records review/summary: Completed    Lab Data Review:  Recent Results (from the past 24 hour(s))   ACCU-CHEK GLUCOSE    Collection Time: 12/18/18  7:13 AM   Result Value Ref Range    Glucose - Accu-Ck 321 (H) 65 - 99 mg/dL   HCG Qual Serum    Collection Time: 12/18/18  7:50 AM   Result Value Ref Range    Beta-Hcg Qualitative Serum Negative Negative   CBC w/ Differential    Collection Time: 12/18/18  7:50 AM   Result Value Ref Range    WBC 15.8 (H) 4.8 - 10.8 K/uL    RBC 5.75 (H) 4.20 - 5.40 M/uL    Hemoglobin 16.6 (H) 12.0 - 16.0 g/dL    Hematocrit 51.8 (H) 37.0 - 47.0 %    MCV 90.1 81.4 - 97.8 fL    MCH 28.9 27.0 - 33.0 pg    MCHC 32.0 (L) 33.6 - 35.0 g/dL    RDW 44.3 35.9 - 50.0 fL    Platelet Count 303 164 - 446 K/uL    MPV 10.8 9.0 - 12.9 fL    Neutrophils-Polys 73.40 (H) 44.00 - 72.00 %    Lymphocytes 17.80 (L) 22.00 - 41.00 %    Monocytes 7.00 0.00 - 13.40 %    Eosinophils 0.20 0.00 - 6.90 %    Basophils 0.60 0.00 - 1.80 %    Immature Granulocytes 1.00 (H) 0.00 - 0.90 %    Nucleated RBC 0.00 /100 WBC    Neutrophils (Absolute) 11.60 (H) 2.00 - 7.15 K/uL    Lymphs (Absolute) 2.81 1.00 - 4.80 K/uL    Monos (Absolute) 1.11 (H) 0.00 - 0.85 K/uL    Eos (Absolute) 0.03 0.00 - 0.51 K/uL    Baso (Absolute) 0.09 0.00 - 0.12 K/uL    Immature Granulocytes (abs) 0.16 (H) 0.00 - 0.11 K/uL    NRBC  (Absolute) 0.00 K/uL   Complete Metabolic Panel (CMP)    Collection Time: 12/18/18  7:50 AM   Result Value Ref Range    Sodium 138 135 - 145 mmol/L    Potassium 4.3 3.6 - 5.5 mmol/L    Chloride 108 96 - 112 mmol/L    Co2 5 (LL) 20 - 33 mmol/L    Anion Gap 25.0 (H) 0.0 - 11.9    Glucose 369 (H) 65 - 99 mg/dL    Bun 25 (H) 8 - 22 mg/dL    Creatinine 1.16 0.50 - 1.40 mg/dL    Calcium 9.0 8.5 - 10.5 mg/dL    AST(SGOT) 11 (L) 12 - 45 U/L    ALT(SGPT) 16 2 - 50 U/L    Alkaline Phosphatase 116 (H) 30 - 99 U/L    Total Bilirubin 0.4 0.1 - 1.5 mg/dL    Albumin 4.9 3.2 - 4.9 g/dL    Total Protein 7.7 6.0 - 8.2 g/dL    Globulin 2.8 1.9 - 3.5 g/dL    A-G Ratio 1.8 g/dL   MAGNESIUM    Collection Time: 12/18/18  7:50 AM   Result Value Ref Range    Magnesium 2.2 1.5 - 2.5 mg/dL   BETA-HYDROXYBUTYRIC ACID    Collection Time: 12/18/18  7:50 AM   Result Value Ref Range    beta-Hydroxybutyric Acid >8.00 (H) 0.02 - 0.27 mmol/L   ESTIMATED GFR    Collection Time: 12/18/18  7:50 AM   Result Value Ref Range    GFR If African American >60 >60 mL/min/1.73 m 2    GFR If Non African American 55 (A) >60 mL/min/1.73 m 2   VENOUS BLOOD GAS    Collection Time: 12/18/18  9:49 AM   Result Value Ref Range    Venous Bg Ph 6.91 (LL) 7.31 - 7.45    Venous Bg Pco2 15.8 (L) 41.0 - 51.0 mmHg    Venous Bg Po2 55.3 (H) 25.0 - 40.0 mmHg    Venous Bg O2 Saturation 83.9 %    Venous Bg Hco3 3 (L) 24 - 28 mmol/L    Venous Bg Base Excess -29 mmol/L    Body Temp see below Centigrade   ACCU-CHEK GLUCOSE    Collection Time: 12/18/18 10:35 AM   Result Value Ref Range    Glucose - Accu-Ck 424 (HH) 65 - 99 mg/dL   Urinalysis, culture if indicated    Collection Time: 12/18/18 11:30 AM   Result Value Ref Range    Color Yellow     Character Clear     Specific Gravity 1.026 <1.035    Ph 5.0 5.0 - 8.0    Glucose >=1000 (A) Negative mg/dL    Ketones >=160 Negative mg/dL    Protein 100 (A) Negative mg/dL    Bilirubin Negative Negative    Urobilinogen, Urine 0.2 Negative     Nitrite Negative Negative    Leukocyte Esterase Negative Negative    Occult Blood Trace (A) Negative    Micro Urine Req Microscopic    URINE MICROSCOPIC (W/UA)    Collection Time: 12/18/18 11:30 AM   Result Value Ref Range    WBC 2-5 /hpf    RBC 10-20 (A) /hpf    Bacteria Few (A) None /hpf    Epithelial Cells Few /hpf    Hyaline Cast 3-5 (A) /lpf   ACCU-CHEK GLUCOSE    Collection Time: 12/18/18 12:02 PM   Result Value Ref Range    Glucose - Accu-Ck 394 (H) 65 - 99 mg/dL   Basic Metabolic Panel (BMP)    Collection Time: 12/18/18 12:40 PM   Result Value Ref Range    Sodium 140 135 - 145 mmol/L    Potassium 5.1 3.6 - 5.5 mmol/L    Chloride 112 96 - 112 mmol/L    Co2 <5 (LL) 20 - 33 mmol/L    Glucose 389 (H) 65 - 99 mg/dL    Bun 25 (H) 8 - 22 mg/dL    Creatinine 1.15 0.50 - 1.40 mg/dL    Calcium 8.7 8.5 - 10.5 mg/dL    Anion Gap 23.0 (H) 0.0 - 11.9   MAGNESIUM    Collection Time: 12/18/18 12:40 PM   Result Value Ref Range    Magnesium 2.0 1.5 - 2.5 mg/dL   PHOSPHORUS    Collection Time: 12/18/18 12:40 PM   Result Value Ref Range    Phosphorus 4.6 (H) 2.5 - 4.5 mg/dL   ESTIMATED GFR    Collection Time: 12/18/18 12:40 PM   Result Value Ref Range    GFR If African American >60 >60 mL/min/1.73 m 2    GFR If Non  56 (A) >60 mL/min/1.73 m 2   LIPASE    Collection Time: 12/18/18 12:40 PM   Result Value Ref Range    Lipase 15 11 - 82 U/L   ISTAT ARTERIAL BLOOD GAS    Collection Time: 12/18/18  3:19 PM   Result Value Ref Range    Ph 7.090 (LL) 7.400 - 7.500    Pco2 12.2 (L) 26.0 - 37.0 mmHg    Po2 100 (H) 64 - 87 mmHg    Tco2 <5 (LL) 20 - 33 mmol/L    S02 95 93 - 99 %    Hco3 3.7 (L) 17.0 - 25.0 mmol/L    BE -24 (L) -4 - 3 mmol/L    Body Temp 98.2 F degrees    O2 Therapy 21 %    iPF Ratio 476     Ph Temp Fabby 7.093 (LL) 7.400 - 7.500    Pco2 Temp Co 12.1 (L) 26.0 - 37.0 mmHg    Po2 Temp Cor 98 (H) 64 - 87 mmHg    Specimen Arterial     Action Range Triggered YES     Inst. Qualified Patient YES               Imaging/Procedures Review:    Independant Imaging Review: Completed  US-RENAL   Final Result      1.  Moderate RIGHT hydronephrosis.   2.  Unremarkable LEFT kidney.      DX-CHEST-PORTABLE (1 VIEW)   Final Result      Right central line projects over the superior cavoatrial junction.      No acute cardiopulmonary process is identified.           Records reviewed and summarized in current documentation :  Yes  [employer] teaching service handout given to patient:  No             Assessment/Plan     Diabetic ketoacidosis (HCC)  -Mild leukocytosis but no clear sources of infxn, possibly due to hemoconcentration  -Likely 2/2 medical noncompliance  -Continue insulin drip at 0.05 mg/kg/hr  -Q4H BMPs and Q8H mag/phos for monitoring  -2 L bolus of LR, after bolus continue 150  -Q1H accuchecks  -Hypoglycemia protocol, start on D5W if BS <200 and D10W if BS <150  -Replete K if <4  -CTM on tele with cardiac monitor    Diabetes type I (CMS-HCC)  -Poor control, most recent HA1c 11.8  -Mother says possibly ran out of rapid acting insulin  -Multiple previous DKA admissions  -Diabetic counseling via diabetic nurse  -Outpt endo f/u    Gastroparesis  -Chronic sequela of uncontrolled DM1  -Restart home reglan but consider other agent long term given neurological risk in young pt    Intractable nausea and vomiting  -Likely due to DKA and gastroparesis  -Restart home reglan    Leukocytosis  -UA and CXR not suggestive of infxn  -Foot and oral exam show no obvious focus of infxn  -Pt denies any recent illness, wounds, or suggestive symptoms  -Also noted to have slightly elevated BUN/Cre, Hgb, Plt  -Likely due to hemoconcentration 2/2 N/V  -CTM with low threshold to start abx    Flank pain  -Notes L flank and CVA pain  -UA not suggestive of infxn, renal US shows normal L kidney and R kidney with mild hydronephrosis?  -CTM I/Os closely   -Tylenol and Dilaudid for pain      Anticipated Hospital stay:  >2 midnights        Quality  Measures  Quality-Core Measures   Reviewed items::  EKG reviewed, Labs reviewed, Medications reviewed and Radiology images reviewed  Pitt catheter::  No Pitt  DVT prophylaxis pharmacological::  Not indicated at this time, ambulatory    PCP: Navi Huber M.D.

## 2018-12-19 NOTE — CARE PLAN
Problem: Pain Management  Goal: Pain level will decrease to patient's comfort goal    Intervention: Follow pain managment plan developed in collaboration with patient and Interdisciplinary Team  Pt medicated for pain per MAR      Problem: Psychosocial Needs:  Goal: Level of anxiety will decrease    Intervention: Identify and develop with patient strategies to cope with anxiety triggers  Mother at bedside to offer emotional support to pt

## 2018-12-19 NOTE — CARE PLAN
Problem: Acute Care of the Diabetic Patient  Goal: Optimal Outcome for the Diabetic Patient  Outcome: PROGRESSING AS EXPECTED  Pt on DKA protocol at beginning of shift - Insulin gtt turned off at 0720 as pt's BS=66.  Continued to check BS Q1hr.  Lantus given, will start sliding scale insulin per MD order.      Problem: Nutrition Deficit  Goal: Adequate Food and Fluid Intake  Outcome: PROGRESSING SLOWER THAN EXPECTED  Pt placed on clear liquid diet as continues to have nausea/vomiting despite antiemetics.  PO intake is poor.

## 2018-12-19 NOTE — CARE PLAN
Problem: Safety  Goal: Will remain free from injury  Patient educated on the use of the call light, bed alarm, fall prevention education. Patient verbalizes and demonstrates an understanding.    Problem: Knowledge Deficit  Goal: Knowledge of disease process/condition, treatment plan, diagnostic tests, and medications will improve  Patient educated on treatment plan, medications, diagnostic testing, disease process. Questions regarding plan of care answered, patient verbalizes an understanding.

## 2018-12-19 NOTE — CONSULTS
Critical Care Consultation    Date of consult: 12/18/2018    Referring Physician  Kostas Landin M.D.    Reason for Consultation  DKA    History of Presenting Illness  28 y.o. female past medical history of type 1 diabetes, frequent DKA, gastroparesis who presented 12/18/2018 with 2 weeks of worsening nausea, vomiting, abdominal pain with overall fatigue and malaise.  Mother reports the patient also had some upper respiratory symptoms prior however this has resolved.  She presented to the emergency department and was found to be in diabetic ketoacidosis with a CO2 of 5, glucose 369, anion gap 25, WBC 15.8, hemoglobin 16.6, platelet 303.  She was given 2 L of IV fluids and started on an insulin infusion.  She is admitted to the ICU for further care of her diabetic ketoacidosis.  At this time patient complains of left upper quadrant pain, left flank pain and headache.    Code Status  Full Code    Review of Systems  Review of Systems   Constitutional: Positive for fatigue. Negative for chills, fever and unexpected weight change.   HENT: Negative for sore throat, trouble swallowing and voice change.    Eyes: Negative for visual disturbance.   Respiratory: Negative for cough, shortness of breath and wheezing.    Cardiovascular: Negative for chest pain and palpitations.   Gastrointestinal: Positive for abdominal pain and nausea. Negative for constipation, diarrhea and vomiting.   Endocrine: Positive for polydipsia and polyuria.   Genitourinary: Positive for flank pain (left). Negative for decreased urine volume and difficulty urinating.   Musculoskeletal: Negative for arthralgias, myalgias and neck pain.   Skin: Negative for rash.   Neurological: Positive for weakness and headaches. Negative for seizures, speech difficulty and numbness.   Psychiatric/Behavioral: Negative for confusion.       Past Medical History   has a past medical history of Backpain; Bronchitis; Diabetes type 1, controlled (HCC); DKA (diabetic  ketoacidoses) (HCC); Fall; Gastroparesis; Kidney infection; Pneumonia; and UTI (urinary tract infection).    Surgical History   has a past surgical history that includes gastroscopy-endo (9/18/2014); gastroscopy with biopsy (9/18/2014); other; submandible abscess incision and drainage (Left, 11/8/2017); dental extraction(s) (11/8/2017); and gastroscopy-endo (N/A, 6/9/2018).    Family History  family history includes Cancer in her unknown relative; Hypertension in her maternal grandfather.    Social History   reports that she has never smoked. She has never used smokeless tobacco. She reports that she does not drink alcohol or use drugs.    Medications  Home Medications     Reviewed by Blade Brice (Pharmacy Tech) on 12/18/18 at 1102  Med List Status: Complete   Medication Last Dose Status   atorvastatin (LIPITOR) 20 MG Tab 12/17/2018 Active   Cholecalciferol 2000 UNIT Cap 12/17/2018 Active   ferrous sulfate 325 (65 Fe) MG tablet 12/17/2018 Active   Insulin Glargine (BASAGLAR KWIKPEN) 100 UNIT/ML Solution Pen-injector 12/17/2018 Active   insulin glulisine (APIDRA) 100 UNIT/ML Solution Pen-injector injection 12/17/2018 Active   metoclopramide (REGLAN) 10 MG/10ML Solution 12/17/2018 Active   Omega-3 Fatty Acids (OMEGA 3 PO) 12/17/2018 Active              Current Facility-Administered Medications   Medication Dose Route Frequency Provider Last Rate Last Dose   • dextrose 10% and 0.45% NaCl infusion   Intravenous Continuous Adrian Sanchez M.D.   Stopped at 12/18/18 1230   • MD ALERT-PHARMACY TO CONSULT FOR DKA MONITORING 1 Each  1 Each Other PRN Adrian Sanchez M.D.       • magnesium sulfate IVPB premix 2 g  2 g Intravenous Once PRN Adrian Sanchez M.D. 25 mL/hr at 12/18/18 1825 2 g at 12/18/18 1825   • potassium phosphates 30 mmol in  mL ivpb  30 mmol Intravenous Once PRN Adrian Sanchez M.D.        Or   • sodium phosphate 30 mmol in 1/2  mL ivpb  30 mmol Intravenous Once PRN  Adrian Sanchez M.D.       • Adult DKA potassium(K+) replacement scale  1 Each Intravenous Q4HRS Adrian Sanchez M.D.   1 Each at 12/18/18 1332   • lactated ringers infusion   Intravenous Continuous Adrian Sanchez M.D.   Stopped at 12/18/18 1607   • D5LR infusion   Intravenous Continuous Adrian Sanchez M.D. 150 mL/hr at 12/18/18 1607     • [START ON 12/19/2018] atorvastatin (LIPITOR) tablet 20 mg  20 mg Oral DAILY Adrian Sanchez M.D.       • metoclopramide (REGLAN) 5 MG/5ML solution 10 mg  10 mg Oral TID AC Adrian Sanchez M.D.       • insulin regular human (HUMULIN/NOVOLIN R) 62.5 Units in  mL Infusion  0.05 Units/kg/hr Intravenous Continuous Adrian Sanchez M.D. 32 mL/hr at 12/18/18 1817 8 Units/hr at 12/18/18 1817   • HYDROmorphone pf (DILAUDID) injection 0.5 mg  0.5 mg Intravenous Q2HRS PRN Cholo Alan Jr., D.O.   0.5 mg at 12/18/18 1823   • acetaminophen (TYLENOL) tablet 650 mg  650 mg Oral Q6HRS PRN Cholo Alan Jr., D.O.   650 mg at 12/18/18 1539   • sodium bicarbonate 8.4 % 150 mEq in D5W 1,000 mL infusion  150 mEq Intravenous Continuous Cholo Alan Jr., D.O. 100 mL/hr at 12/18/18 1706 150 mEq at 12/18/18 1706   • senna-docusate (PERICOLACE or SENOKOT S) 8.6-50 MG per tablet 2 Tab  2 Tab Oral BID Adrian Sanchez M.D.        And   • polyethylene glycol/lytes (MIRALAX) PACKET 1 Packet  1 Packet Oral QDAY PRN Adrian Sanchez M.D.        And   • magnesium hydroxide (MILK OF MAGNESIA) suspension 30 mL  30 mL Oral QDAY PRN Adrian Sanchez M.D.        And   • bisacodyl (DULCOLAX) suppository 10 mg  10 mg Rectal QDAY PRN Adrian Sanchez M.D.       • hydrALAZINE (APRESOLINE) injection 10 mg  10 mg Intravenous Q4HRS PRN Adrian Sanchez M.D.       • ondansetron (ZOFRAN) syringe/vial injection 4 mg  4 mg Intravenous Q4HRS PRN Adrian Sanchez M.D.   4 mg at 12/18/18 0835   • ondansetron (ZOFRAN ODT) dispertab 4 mg  4 mg Oral Q4HRS PRN Adrian GE  ANA LILIA Sanchez.       • potassium chloride in water (KCL) ivpb **Administer in ICU only** 20 mEq  20 mEq Intravenous Once Jeremy M Gonda, M.D.           Allergies  Allergies   Allergen Reactions   • Pcn [Penicillins] Shortness of Breath and Swelling     Per patient's Mom, patient tolerates Keflex   • Lisinopril Unspecified     Per historical: Reported pedal swelling. No facial/angioedema or rash nor respiratory symptoms.    • Promethazine Vomiting       Vital Signs last 24 hours  Temp:  [35.9 °C (96.6 °F)-37.3 °C (99.1 °F)] 37.3 °C (99.1 °F)  Pulse:  [110-162] 133  Resp:  [16-47] 18  BP: (119)/(69-77) 119/69    Physical Exam  Physical Exam   Constitutional: She is oriented to person, place, and time. She appears well-developed and well-nourished. She is cooperative. She appears toxic. She appears distressed.   HENT:   Head: Normocephalic and atraumatic.   Right Ear: External ear normal.   Left Ear: External ear normal.   Nose: Nose normal.   Mouth/Throat: Mucous membranes are dry.   Eyes: Conjunctivae and EOM are normal.   Neck: Neck supple. No JVD present. No tracheal deviation present.   Cardiovascular: Regular rhythm and intact distal pulses.  Tachycardia present.    Pulmonary/Chest: No accessory muscle usage. She is in respiratory distress.   Kussmaul respirations   Abdominal: Soft. Bowel sounds are normal. She exhibits no distension. There is no tenderness.   Musculoskeletal: Normal range of motion. She exhibits no tenderness or deformity.   Neurological: She is alert and oriented to person, place, and time. No cranial nerve deficit or sensory deficit. She exhibits normal muscle tone. Coordination normal. GCS eye subscore is 4. GCS verbal subscore is 5. GCS motor subscore is 6.   Skin: Skin is warm and dry. No rash noted.   Psychiatric: She has a normal mood and affect. Her behavior is normal.   Nursing note and vitals reviewed.      Fluids    Intake/Output Summary (Last 24 hours) at 12/18/18 4016  Last data  filed at 12/18/18 1700   Gross per 24 hour   Intake          5218.64 ml   Output             1450 ml   Net          3768.64 ml       Laboratory  Recent Results (from the past 48 hour(s))   ACCU-CHEK GLUCOSE    Collection Time: 12/18/18  7:13 AM   Result Value Ref Range    Glucose - Accu-Ck 321 (H) 65 - 99 mg/dL   HCG Qual Serum    Collection Time: 12/18/18  7:50 AM   Result Value Ref Range    Beta-Hcg Qualitative Serum Negative Negative   CBC w/ Differential    Collection Time: 12/18/18  7:50 AM   Result Value Ref Range    WBC 15.8 (H) 4.8 - 10.8 K/uL    RBC 5.75 (H) 4.20 - 5.40 M/uL    Hemoglobin 16.6 (H) 12.0 - 16.0 g/dL    Hematocrit 51.8 (H) 37.0 - 47.0 %    MCV 90.1 81.4 - 97.8 fL    MCH 28.9 27.0 - 33.0 pg    MCHC 32.0 (L) 33.6 - 35.0 g/dL    RDW 44.3 35.9 - 50.0 fL    Platelet Count 303 164 - 446 K/uL    MPV 10.8 9.0 - 12.9 fL    Neutrophils-Polys 73.40 (H) 44.00 - 72.00 %    Lymphocytes 17.80 (L) 22.00 - 41.00 %    Monocytes 7.00 0.00 - 13.40 %    Eosinophils 0.20 0.00 - 6.90 %    Basophils 0.60 0.00 - 1.80 %    Immature Granulocytes 1.00 (H) 0.00 - 0.90 %    Nucleated RBC 0.00 /100 WBC    Neutrophils (Absolute) 11.60 (H) 2.00 - 7.15 K/uL    Lymphs (Absolute) 2.81 1.00 - 4.80 K/uL    Monos (Absolute) 1.11 (H) 0.00 - 0.85 K/uL    Eos (Absolute) 0.03 0.00 - 0.51 K/uL    Baso (Absolute) 0.09 0.00 - 0.12 K/uL    Immature Granulocytes (abs) 0.16 (H) 0.00 - 0.11 K/uL    NRBC (Absolute) 0.00 K/uL   Complete Metabolic Panel (CMP)    Collection Time: 12/18/18  7:50 AM   Result Value Ref Range    Sodium 138 135 - 145 mmol/L    Potassium 4.3 3.6 - 5.5 mmol/L    Chloride 108 96 - 112 mmol/L    Co2 5 (LL) 20 - 33 mmol/L    Anion Gap 25.0 (H) 0.0 - 11.9    Glucose 369 (H) 65 - 99 mg/dL    Bun 25 (H) 8 - 22 mg/dL    Creatinine 1.16 0.50 - 1.40 mg/dL    Calcium 9.0 8.5 - 10.5 mg/dL    AST(SGOT) 11 (L) 12 - 45 U/L    ALT(SGPT) 16 2 - 50 U/L    Alkaline Phosphatase 116 (H) 30 - 99 U/L    Total Bilirubin 0.4 0.1 - 1.5 mg/dL     Albumin 4.9 3.2 - 4.9 g/dL    Total Protein 7.7 6.0 - 8.2 g/dL    Globulin 2.8 1.9 - 3.5 g/dL    A-G Ratio 1.8 g/dL   MAGNESIUM    Collection Time: 12/18/18  7:50 AM   Result Value Ref Range    Magnesium 2.2 1.5 - 2.5 mg/dL   BETA-HYDROXYBUTYRIC ACID    Collection Time: 12/18/18  7:50 AM   Result Value Ref Range    beta-Hydroxybutyric Acid >8.00 (H) 0.02 - 0.27 mmol/L   ESTIMATED GFR    Collection Time: 12/18/18  7:50 AM   Result Value Ref Range    GFR If African American >60 >60 mL/min/1.73 m 2    GFR If Non African American 55 (A) >60 mL/min/1.73 m 2   VENOUS BLOOD GAS    Collection Time: 12/18/18  9:49 AM   Result Value Ref Range    Venous Bg Ph 6.91 (LL) 7.31 - 7.45    Venous Bg Pco2 15.8 (L) 41.0 - 51.0 mmHg    Venous Bg Po2 55.3 (H) 25.0 - 40.0 mmHg    Venous Bg O2 Saturation 83.9 %    Venous Bg Hco3 3 (L) 24 - 28 mmol/L    Venous Bg Base Excess -29 mmol/L    Body Temp see below Centigrade   ACCU-CHEK GLUCOSE    Collection Time: 12/18/18 10:35 AM   Result Value Ref Range    Glucose - Accu-Ck 424 (HH) 65 - 99 mg/dL   Urinalysis, culture if indicated    Collection Time: 12/18/18 11:30 AM   Result Value Ref Range    Color Yellow     Character Clear     Specific Gravity 1.026 <1.035    Ph 5.0 5.0 - 8.0    Glucose >=1000 (A) Negative mg/dL    Ketones >=160 Negative mg/dL    Protein 100 (A) Negative mg/dL    Bilirubin Negative Negative    Urobilinogen, Urine 0.2 Negative    Nitrite Negative Negative    Leukocyte Esterase Negative Negative    Occult Blood Trace (A) Negative    Micro Urine Req Microscopic    URINE MICROSCOPIC (W/UA)    Collection Time: 12/18/18 11:30 AM   Result Value Ref Range    WBC 2-5 /hpf    RBC 10-20 (A) /hpf    Bacteria Few (A) None /hpf    Epithelial Cells Few /hpf    Hyaline Cast 3-5 (A) /lpf   ACCU-CHEK GLUCOSE    Collection Time: 12/18/18 12:02 PM   Result Value Ref Range    Glucose - Accu-Ck 394 (H) 65 - 99 mg/dL   Basic Metabolic Panel (BMP)    Collection Time: 12/18/18 12:40 PM   Result  Value Ref Range    Sodium 140 135 - 145 mmol/L    Potassium 5.1 3.6 - 5.5 mmol/L    Chloride 112 96 - 112 mmol/L    Co2 <5 (LL) 20 - 33 mmol/L    Glucose 389 (H) 65 - 99 mg/dL    Bun 25 (H) 8 - 22 mg/dL    Creatinine 1.15 0.50 - 1.40 mg/dL    Calcium 8.7 8.5 - 10.5 mg/dL    Anion Gap 23.0 (H) 0.0 - 11.9   MAGNESIUM    Collection Time: 12/18/18 12:40 PM   Result Value Ref Range    Magnesium 2.0 1.5 - 2.5 mg/dL   PHOSPHORUS    Collection Time: 12/18/18 12:40 PM   Result Value Ref Range    Phosphorus 4.6 (H) 2.5 - 4.5 mg/dL   ESTIMATED GFR    Collection Time: 12/18/18 12:40 PM   Result Value Ref Range    GFR If African American >60 >60 mL/min/1.73 m 2    GFR If Non  56 (A) >60 mL/min/1.73 m 2   LIPASE    Collection Time: 12/18/18 12:40 PM   Result Value Ref Range    Lipase 15 11 - 82 U/L   ACCU-CHEK GLUCOSE    Collection Time: 12/18/18  1:03 PM   Result Value Ref Range    Glucose - Accu-Ck 390 (H) 65 - 99 mg/dL   ACCU-CHEK GLUCOSE    Collection Time: 12/18/18  2:05 PM   Result Value Ref Range    Glucose - Accu-Ck 295 (H) 65 - 99 mg/dL   ACCU-CHEK GLUCOSE    Collection Time: 12/18/18  3:01 PM   Result Value Ref Range    Glucose - Accu-Ck 253 (H) 65 - 99 mg/dL   ISTAT ARTERIAL BLOOD GAS    Collection Time: 12/18/18  3:19 PM   Result Value Ref Range    Ph 7.090 (LL) 7.400 - 7.500    Pco2 12.2 (L) 26.0 - 37.0 mmHg    Po2 100 (H) 64 - 87 mmHg    Tco2 <5 (LL) 20 - 33 mmol/L    S02 95 93 - 99 %    Hco3 3.7 (L) 17.0 - 25.0 mmol/L    BE -24 (L) -4 - 3 mmol/L    Body Temp 98.2 F degrees    O2 Therapy 21 %    iPF Ratio 476     Ph Temp [place] 7.093 (LL) 7.400 - 7.500    Pco2 Temp Co 12.1 (L) 26.0 - 37.0 mmHg    Po2 Temp Cor 98 (H) 64 - 87 mmHg    Specimen Arterial     Action Range Triggered YES     Inst. Qualified Patient YES    ACCU-CHEK GLUCOSE    Collection Time: 12/18/18  4:06 PM   Result Value Ref Range    Glucose - Accu-Ck 143 (H) 65 - 99 mg/dL   Basic Metabolic Panel (BMP)    Collection Time: 12/18/18  5:00 PM    Result Value Ref Range    Sodium 146 (H) 135 - 145 mmol/L    Potassium 3.3 (L) 3.6 - 5.5 mmol/L    Chloride 119 (H) 96 - 112 mmol/L    Co2 8 (LL) 20 - 33 mmol/L    Glucose 169 (H) 65 - 99 mg/dL    Bun 18 8 - 22 mg/dL    Creatinine 0.65 0.50 - 1.40 mg/dL    Calcium 8.2 (L) 8.5 - 10.5 mg/dL    Anion Gap 19.0 (H) 0.0 - 11.9   Magnesium    Collection Time: 18  5:00 PM   Result Value Ref Range    Magnesium 1.4 (L) 1.5 - 2.5 mg/dL   Phosphorus    Collection Time: 18  5:00 PM   Result Value Ref Range    Phosphorus <1.0 (LL) 2.5 - 4.5 mg/dL   LIPASE    Collection Time: 18  5:00 PM   Result Value Ref Range    Lipase 32 11 - 82 U/L   ESTIMATED GFR    Collection Time: 18  5:00 PM   Result Value Ref Range    GFR If African American >60 >60 mL/min/1.73 m 2    GFR If Non African American >60 >60 mL/min/1.73 m 2   ACCU-CHEK GLUCOSE    Collection Time: 18  5:02 PM   Result Value Ref Range    Glucose - Accu-Ck 157 (H) 65 - 99 mg/dL   EKG    Collection Time: 18  5:31 PM   Result Value Ref Range    Report       [facility]    Test Date:  2018  Pt Name:    AMANDA BRANCH            Department: [facility]  MRN:        [id number]                      Room:       R110  Gender:     Female                       Technician: JUHI  :        1990                   Requested By:MIRI KINNEY  Order #:    [id number]                    Reading MD:    Measurements  Intervals                                Axis  Rate:       126                          P:          69  NY:         152                          QRS:        -9  QRSD:       62                           T:          49  QT:         292  QTc:        423    Interpretive Statements  SINUS TACHYCARDIA  Compared to ECG 2018 20:03:30  No significant changes     ACCU-CHEK GLUCOSE    Collection Time: 18  6:17 PM   Result Value Ref Range    Glucose - Accu-Ck 152 (H) 65 - 99 mg/dL       Imaging  DX-CHEST-PORTABLE (1 VIEW)   Final  Result      Right central line projects over the superior cavoatrial junction.      No acute cardiopulmonary process is identified.             Assessment/Plan  Diabetic ketoacidosis (HCC)- (present on admission)   Assessment & Plan    ICU admission, cardiac monitoring  optimize intravascular volume with 3L additional IVF bolus  DKA protocol with insulin drip at 0.1 units/kg/hr  Every hour Accu-Cheks, every 4 hour BMP, mag, phos  Goal Magnesium: >2, Phosphorus: 2, K >4  Will transition to sliding scale insulin when anion gap <12, CO2 > 17  I will start the patient on a bicarbonate infusion due to her severe metabolic acidosis       Intractable nausea and vomiting- (present on admission)   Assessment & Plan    Likely due to her underlying gastroparesis compounded by diabetic ketoacidosis  Continue Zofran and Reglan as needed     Gastroparesis- (present on admission)   Assessment & Plan    Continue scheduled Reglan  As needed Zofran     Diabetes type I (CMS-HCC)- (present on admission)   Assessment & Plan    Poorly controlled  Hemoglobin A1c 11.8  Multiple complications of diabetes     Leukocytosis- (present on admission)   Assessment & Plan    Likely reactive due to severe dehydration and DKA  Monitor, low threshold to begin antibiotics     Flank pain- (present on admission)   Assessment & Plan    UA unremarkable  Lipase normal  Check renal ultrasound  Symptomatically treat  CT if worsening or not improved     Difficult intravenous access- (present on admission)   Assessment & Plan    Patient will require a central venous line due to very poor peripheral access     Dehydration- (present on admission)   Assessment & Plan    Do to diabetic ketoacidosis and urine losses  Aggressive crystalloid resuscitation         Discussed patient condition and risk of morbidity and/or mortality with Family, RN, RT, Pharmacy, [employer] [facility] resident and Patient.      The patient remains critically ill.  Critical care time = 89 minutes in  directly providing and coordinating critical care and extensive data review.  No time overlap and excludes procedures.

## 2018-12-19 NOTE — PROGRESS NOTES
Dr. Gonda updated on phosphorus of less than 1 and K+ of 3.3, reviewed prn orders per DKA protocol. 20 mEq of K+ to be given in addition to prn K-phos. Order read back.

## 2018-12-19 NOTE — ASSESSMENT & PLAN NOTE
-Poor control, most recent HA1c 11.8  -Mother says possibly ran out of rapid acting insulin  -Multiple previous DKA admissions  -Diabetic counseling via diabetic nurse  -Outpt endo f/u

## 2018-12-19 NOTE — ASSESSMENT & PLAN NOTE
-Chronic sequela of uncontrolled DM1  -Restart home reglan but consider other agent long term given neurological risk in young pt

## 2018-12-19 NOTE — ASSESSMENT & PLAN NOTE
This most likely represents a mixed diabetic ketoacidosis and starvation ketosis given the patient's inability to eat for the last 5 days.    ICU transfer, cardiac monitoring  I will optimize intravascular volume with IVF bolus  DKA protocol with insulin drip at 0.05 units/kg/hr  Every hour Accu-Cheks, every 4 hour BMP, mag, phos  Goal Magnesium: >2, Phosphorus: 2, K >4  Will transition to sliding scale insulin when anion gap <12, CO2 > 17

## 2018-12-19 NOTE — PROGRESS NOTES
Tech from Lab called with critical result of potassium at 2.7. Critical lab result read back to tech.   Dr. Gonda notified of critical lab result at 2:20.  Critical lab result read back by Dr. Gonda.

## 2018-12-19 NOTE — PROGRESS NOTES
BMP resulted with K of 2.7, phos of 1.3 and mag of 1.9. Dr. Gonda paged to update, orders received.  Patient has been vomiting throughout night despite zofran administration. Dr. Gonda notified. Dr. Gonda states to keep patient on insulin gtt until nausea is controlled and placed orders for additional antiemetic.

## 2018-12-19 NOTE — ASSESSMENT & PLAN NOTE
-Mild leukocytosis but no clear sources of infxn, possibly due to hemoconcentration  -Likely 2/2 medical noncompliance  -Continue insulin drip at 0.05 mg/kg/hr  -Q4H BMPs and Q8H mag/phos for monitoring  -2 L bolus of LR, after bolus continue 150  -Q1H accuchecks  -Hypoglycemia protocol, start on D5W if BS <200 and D10W if BS <150  -Replete K if <4  -CTM on tele with cardiac monitor

## 2018-12-19 NOTE — ASSESSMENT & PLAN NOTE
-Notes L flank and CVA pain  -UA not suggestive of infxn, renal US shows normal L kidney and R kidney with mild hydronephrosis?  -CTM I/Os closely   -Tylenol and Dilaudid for pain

## 2018-12-19 NOTE — ASSESSMENT & PLAN NOTE
-UA and CXR not suggestive of infxn  -Foot and oral exam show no obvious focus of infxn  -Pt denies any recent illness, wounds, or suggestive symptoms  -Also noted to have slightly elevated BUN/Cre, Hgb, Plt  -Likely due to hemoconcentration 2/2 N/V  -CTM with low threshold to start abx

## 2018-12-19 NOTE — PROGRESS NOTES
"Critical Care Progress Note    Date of admission  12/18/2018    Chief Complaint  \"28 y.o. female past medical history of type 1 diabetes, frequent DKA, gastroparesis who presented 12/18/2018 with 2 weeks of worsening nausea, vomiting, abdominal pain with overall fatigue and malaise.  Mother reports the patient also had some upper respiratory symptoms prior however this has resolved.  She presented to the emergency department and was found to be in diabetic ketoacidosis with a CO2 of 5, glucose 369, anion gap 25, WBC 15.8, hemoglobin 16.6, platelet 303.  She was given 2 L of IV fluids and started on an insulin infusion.  She is admitted to the ICU for further care of her diabetic ketoacidosis.  At this time patient complains of left upper quadrant pain, left flank pain and headache.\"    Hospital Course    12/18/2018: admitted for DKA      Interval Problem Update  Reviewed last 24 hour events:   - severe emesis overnight, improved with compazine. AG closed CO2 improved   - Neuro: AOx4   - HR: 80s-100s   - SBP: 100s-120s   - GI: N/V ADAT to FLD   - UOP: adequate   - Pitt: no   - Tm: afeb   - Lines: CVC   - PPx: GI pepcid, DVT lovenox   - RA    Review of Systems  Review of Systems   Constitutional: Positive for malaise/fatigue. Negative for chills and fever.   Respiratory: Negative for cough, sputum production, shortness of breath and stridor.    Cardiovascular: Negative for chest pain.   Gastrointestinal: Positive for nausea and vomiting. Negative for abdominal pain.   Genitourinary: Positive for frequency. Negative for dysuria.   Musculoskeletal: Negative for myalgias.   Skin: Negative for rash.   Neurological: Positive for headaches. Negative for dizziness, sensory change and focal weakness.   Endo/Heme/Allergies: Positive for polydipsia.        Vital Signs for last 24 hours   Temp:  [36.2 °C (97.2 °F)-37.3 °C (99.1 °F)] 36.7 °C (98 °F)  Pulse:  [] 102  Resp:  [13-47] 15  BP: (119)/(69) 119/69    Hemodynamic " parameters for last 24 hours       Respiratory       Physical Exam   Physical Exam   Constitutional: She is oriented to person, place, and time. She appears well-developed and well-nourished. She appears distressed.   HENT:   Head: Normocephalic and atraumatic.   Right Ear: External ear normal.   Left Ear: External ear normal.   Nose: Nose normal.   Eyes: Pupils are equal, round, and reactive to light. Conjunctivae are normal.   Neck: Neck supple. No JVD present. No tracheal deviation present.   Cardiovascular: Normal rate, regular rhythm and intact distal pulses.    Pulmonary/Chest: Breath sounds normal. No accessory muscle usage. No respiratory distress.   Abdominal: Soft. Bowel sounds are normal. She exhibits no distension. There is tenderness (mild, diffuse).   Musculoskeletal: Normal range of motion. She exhibits no tenderness or deformity.   Neurological: She is alert and oriented to person, place, and time. She exhibits normal muscle tone. Coordination normal.   Skin: Skin is warm and dry. No rash noted.   Psychiatric: She has a normal mood and affect. Her behavior is normal.   Nursing note and vitals reviewed.      Medications  Current Facility-Administered Medications   Medication Dose Route Frequency Provider Last Rate Last Dose   • prochlorperazine (COMPAZINE) injection 10 mg  10 mg Intravenous Q6HRS PRN Jeremy M Gonda, M.D.   10 mg at 12/19/18 0243   • metoclopramide (REGLAN) injection 10 mg  10 mg Intravenous Q6HRS Jeremy M Gonda, M.D.   10 mg at 12/19/18 0640   • potassium chloride in water (KCL) ivpb **Administer in ICU only** 40 mEq  40 mEq Intravenous Once Cholo Alan Jr., D.O. 50 mL/hr at 12/19/18 0840 40 mEq at 12/19/18 0840   • dextrose 10% and 0.45% NaCl infusion   Intravenous Continuous Adrian Sanchez M.D. 150 mL/hr at 12/19/18 0702     • MD ALERT-PHARMACY TO CONSULT FOR DKA MONITORING 1 Each  1 Each Other PRN Adrian Sanchez M.D.       • Adult DKA potassium(K+) replacement scale   1 Each Intravenous Q4HRS Adrian Sanchez M.D.   1 Each at 12/19/18 0830   • D5LR infusion   Intravenous Continuous Adrian Sanchez M.D.   Stopped at 12/19/18 0000   • atorvastatin (LIPITOR) tablet 20 mg  20 mg Oral DAILY Adrian Sanchez M.D.   Stopped at 12/19/18 0600   • insulin regular human (HUMULIN/NOVOLIN R) 62.5 Units in  mL Infusion  0.05 Units/kg/hr Intravenous Continuous Adrian Sanchez M.D.   Stopped at 12/19/18 0720   • HYDROmorphone pf (DILAUDID) injection 0.5 mg  0.5 mg Intravenous Q2HRS PRN Cholo Alan Jr., D.O.   0.5 mg at 12/19/18 0006   • acetaminophen (TYLENOL) tablet 650 mg  650 mg Oral Q6HRS PRN Cholo Alan Jr., D.O.   650 mg at 12/18/18 1539   • senna-docusate (PERICOLACE or SENOKOT S) 8.6-50 MG per tablet 2 Tab  2 Tab Oral BID Adrian Sanchez M.D.   Stopped at 12/19/18 0600    And   • polyethylene glycol/lytes (MIRALAX) PACKET 1 Packet  1 Packet Oral QDAY PRN Adrian Sanchez M.D.        And   • magnesium hydroxide (MILK OF MAGNESIA) suspension 30 mL  30 mL Oral QDAY PRN Adrian Sanchez M.D.        And   • bisacodyl (DULCOLAX) suppository 10 mg  10 mg Rectal QDAY PRN Adrian Sanchez M.D.       • hydrALAZINE (APRESOLINE) injection 10 mg  10 mg Intravenous Q4HRS PRN Adrian Sanchez M.D.       • ondansetron (ZOFRAN) syringe/vial injection 4 mg  4 mg Intravenous Q4HRS PRN Adrian Sanchez M.D.   4 mg at 12/18/18 2127   • ondansetron (ZOFRAN ODT) dispertab 4 mg  4 mg Oral Q4HRS PRN Adrian Sanchez M.D.           Fluids    Intake/Output Summary (Last 24 hours) at 12/19/18 0915  Last data filed at 12/19/18 0600   Gross per 24 hour   Intake         40894.73 ml   Output             3400 ml   Net          8410.73 ml       Laboratory  Recent Labs      12/18/18   1519   ISTATAPH  7.090*   ISTATAPCO2  12.2*   ISTATAPO2  100*   ISTATATCO2  <5*   [id number]  95   ISTATARTHCO3  3.7*   ISTATARTBE  -24*   ISTATTEMP  98.2 F   ISTATFIO2  21   ISTATSPEC   Arterial   ISTATAPHTC  7.093*   [id number]  98*         Recent Labs      12/18/18   1700  12/18/18   2100  12/19/18   0120  12/19/18   0516   SODIUM  146*  145  147*  148*   POTASSIUM  3.3*  3.3*  2.7*  3.2*   CHLORIDE  119*  119*  120*  119*   CO2  8*  17*  22  24   BUN  18  15  15  14   CREATININE  0.65  0.58  0.54  0.51   MAGNESIUM  1.4*   --   1.9  2.6*   PHOSPHORUS  <1.0*   --   1.3*  1.9*   CALCIUM  8.2*  8.0*  7.8*  7.6*     Recent Labs      12/18/18   0750  12/18/18   1240  12/18/18   1700  12/18/18   2100  12/19/18   0120  12/19/18   0516   ALTSGPT  16   --    --    --   10   --    ASTSGOT  11*   --    --    --   10*   --    ALKPHOSPHAT  116*   --    --    --   64   --    TBILIRUBIN  0.4   --    --    --   0.3   --    LIPASE   --   15  32   --    --    --    GLUCOSE  369*  389*  169*  174*  152*  110*     Recent Labs      12/18/18   0750  12/19/18   0120  12/19/18   0516   WBC  15.8*   --   9.6   NEUTSPOLYS  73.40*   --   71.30   LYMPHOCYTES  17.80*   --   17.90*   MONOCYTES  7.00   --   9.70   EOSINOPHILS  0.20   --   0.20   BASOPHILS  0.60   --   0.50   ASTSGOT  11*  10*   --    ALTSGPT  16  10   --    ALKPHOSPHAT  116*  64   --    TBILIRUBIN  0.4  0.3   --      Recent Labs      12/18/18   0750  12/19/18   0516   RBC  5.75*  4.15*   HEMOGLOBIN  16.6*  12.1   HEMATOCRIT  51.8*  35.4*   PLATELETCT  303  206       Imaging  X-Ray:  I have personally reviewed the images and compared with prior images.    Assessment/Plan  Diabetic ketoacidosis (HCC)- (present on admission)   Assessment & Plan    cardiac monitoring  Change IV fluid LR  Transition insulin infusion to subcutaneous insulin       Intractable nausea and vomiting- (present on admission)   Assessment & Plan    Likely due to her underlying gastroparesis compounded by diabetic ketoacidosis  Continue scheduled IV Reglan, continue Zofran as needed, add Compazine as needed     Gastroparesis- (present on admission)   Assessment & Plan    Continue scheduled  Reglan  As needed Zofran     Diabetes type I (CMS-HCC)- (present on admission)   Assessment & Plan    Poorly controlled  Hemoglobin A1c 11.8  Multiple complications of diabetes     Leukocytosis- (present on admission)   Assessment & Plan    Improved  Monitor, low threshold to begin antibiotics     Flank pain- (present on admission)   Assessment & Plan    UA unremarkable  Lipase normal  Check renal ultrasound  Improved     Difficult intravenous access- (present on admission)   Assessment & Plan    Central venous access obtained     Dehydration- (present on admission)   Assessment & Plan    Due to diabetic ketoacidosis and urine losses  Continue IV fluids        VTE:  Lovenox  Ulcer: PPI  Lines: Central Line  Ongoing indication addressed    I have performed a physical exam and reviewed and updated ROS and Plan today (12/19/2018). In review of yesterday's note (12/18/2018), there are no changes except as documented above.     Titrating insulin gtt. This patient is critically ill, at high risk for decompensation leading to worsening vital organ dysfunction and death without critical care interventions.    Discussed patient condition and risk of morbidity and/or mortality with RN, RT, Pharmacy, Dietary, , [employer] resident, Charge nurse / hot rounds and Patient  The patient remains critically ill.  Critical care time = 32 minutes in directly providing and coordinating critical care and extensive data review.  No time overlap and excludes procedures.

## 2018-12-20 ENCOUNTER — APPOINTMENT (OUTPATIENT)
Dept: RADIOLOGY | Facility: MEDICAL CENTER | Age: 28
DRG: 637 | End: 2018-12-20
Attending: INTERNAL MEDICINE
Payer: MEDICAID

## 2018-12-20 LAB
ALBUMIN SERPL BCP-MCNC: 3 G/DL (ref 3.2–4.9)
ALBUMIN/GLOB SERPL: 1.8 G/DL
ALP SERPL-CCNC: 68 U/L (ref 30–99)
ALT SERPL-CCNC: 10 U/L (ref 2–50)
ANION GAP SERPL CALC-SCNC: 5 MMOL/L (ref 0–11.9)
AST SERPL-CCNC: 14 U/L (ref 12–45)
BASOPHILS # BLD AUTO: 0.7 % (ref 0–1.8)
BASOPHILS # BLD: 0.04 K/UL (ref 0–0.12)
BILIRUB SERPL-MCNC: 0.5 MG/DL (ref 0.1–1.5)
BUN SERPL-MCNC: 10 MG/DL (ref 8–22)
CALCIUM SERPL-MCNC: 8.1 MG/DL (ref 8.5–10.5)
CHLORIDE SERPL-SCNC: 117 MMOL/L (ref 96–112)
CO2 SERPL-SCNC: 23 MMOL/L (ref 20–33)
CREAT SERPL-MCNC: 0.52 MG/DL (ref 0.5–1.4)
EOSINOPHIL # BLD AUTO: 0.13 K/UL (ref 0–0.51)
EOSINOPHIL NFR BLD: 2.4 % (ref 0–6.9)
ERYTHROCYTE [DISTWIDTH] IN BLOOD BY AUTOMATED COUNT: 44.8 FL (ref 35.9–50)
GLOBULIN SER CALC-MCNC: 1.7 G/DL (ref 1.9–3.5)
GLUCOSE BLD-MCNC: 138 MG/DL (ref 65–99)
GLUCOSE BLD-MCNC: 204 MG/DL (ref 65–99)
GLUCOSE BLD-MCNC: 253 MG/DL (ref 65–99)
GLUCOSE BLD-MCNC: 97 MG/DL (ref 65–99)
GLUCOSE SERPL-MCNC: 161 MG/DL (ref 65–99)
HCT VFR BLD AUTO: 36.1 % (ref 37–47)
HGB BLD-MCNC: 12.2 G/DL (ref 12–16)
IMM GRANULOCYTES # BLD AUTO: 0.02 K/UL (ref 0–0.11)
IMM GRANULOCYTES NFR BLD AUTO: 0.4 % (ref 0–0.9)
LYMPHOCYTES # BLD AUTO: 2.16 K/UL (ref 1–4.8)
LYMPHOCYTES NFR BLD: 39.3 % (ref 22–41)
MAGNESIUM SERPL-MCNC: 2 MG/DL (ref 1.5–2.5)
MCH RBC QN AUTO: 29.3 PG (ref 27–33)
MCHC RBC AUTO-ENTMCNC: 33.8 G/DL (ref 33.6–35)
MCV RBC AUTO: 86.6 FL (ref 81.4–97.8)
MONOCYTES # BLD AUTO: 0.57 K/UL (ref 0–0.85)
MONOCYTES NFR BLD AUTO: 10.4 % (ref 0–13.4)
NEUTROPHILS # BLD AUTO: 2.58 K/UL (ref 2–7.15)
NEUTROPHILS NFR BLD: 46.8 % (ref 44–72)
NRBC # BLD AUTO: 0 K/UL
NRBC BLD-RTO: 0 /100 WBC
PHOSPHATE SERPL-MCNC: 2.8 MG/DL (ref 2.5–4.5)
PLATELET # BLD AUTO: 134 K/UL (ref 164–446)
PMV BLD AUTO: 10.5 FL (ref 9–12.9)
POTASSIUM SERPL-SCNC: 3.7 MMOL/L (ref 3.6–5.5)
PROT SERPL-MCNC: 4.7 G/DL (ref 6–8.2)
RBC # BLD AUTO: 4.17 M/UL (ref 4.2–5.4)
SODIUM SERPL-SCNC: 145 MMOL/L (ref 135–145)
WBC # BLD AUTO: 5.5 K/UL (ref 4.8–10.8)

## 2018-12-20 PROCEDURE — 83735 ASSAY OF MAGNESIUM: CPT

## 2018-12-20 PROCEDURE — 700111 HCHG RX REV CODE 636 W/ 250 OVERRIDE (IP): Performed by: STUDENT IN AN ORGANIZED HEALTH CARE EDUCATION/TRAINING PROGRAM

## 2018-12-20 PROCEDURE — 84100 ASSAY OF PHOSPHORUS: CPT

## 2018-12-20 PROCEDURE — A9270 NON-COVERED ITEM OR SERVICE: HCPCS | Performed by: STUDENT IN AN ORGANIZED HEALTH CARE EDUCATION/TRAINING PROGRAM

## 2018-12-20 PROCEDURE — A9270 NON-COVERED ITEM OR SERVICE: HCPCS | Performed by: INTERNAL MEDICINE

## 2018-12-20 PROCEDURE — 700102 HCHG RX REV CODE 250 W/ 637 OVERRIDE(OP): Performed by: INTERNAL MEDICINE

## 2018-12-20 PROCEDURE — 80053 COMPREHEN METABOLIC PANEL: CPT

## 2018-12-20 PROCEDURE — 700105 HCHG RX REV CODE 258: Performed by: INTERNAL MEDICINE

## 2018-12-20 PROCEDURE — 700111 HCHG RX REV CODE 636 W/ 250 OVERRIDE (IP): Performed by: INTERNAL MEDICINE

## 2018-12-20 PROCEDURE — 82962 GLUCOSE BLOOD TEST: CPT | Mod: 91

## 2018-12-20 PROCEDURE — 85025 COMPLETE CBC W/AUTO DIFF WBC: CPT

## 2018-12-20 PROCEDURE — 99233 SBSQ HOSP IP/OBS HIGH 50: CPT | Performed by: INTERNAL MEDICINE

## 2018-12-20 PROCEDURE — 74176 CT ABD & PELVIS W/O CONTRAST: CPT

## 2018-12-20 PROCEDURE — 700102 HCHG RX REV CODE 250 W/ 637 OVERRIDE(OP): Performed by: STUDENT IN AN ORGANIZED HEALTH CARE EDUCATION/TRAINING PROGRAM

## 2018-12-20 PROCEDURE — 770006 HCHG ROOM/CARE - MED/SURG/GYN SEMI*

## 2018-12-20 PROCEDURE — 36569 INSJ PICC 5 YR+ W/O IMAGING: CPT

## 2018-12-20 RX ORDER — HALOPERIDOL 5 MG/1
5 TABLET ORAL EVERY 6 HOURS
Status: DISCONTINUED | OUTPATIENT
Start: 2018-12-20 | End: 2018-12-21

## 2018-12-20 RX ORDER — INSULIN GLARGINE 100 [IU]/ML
20 INJECTION, SOLUTION SUBCUTANEOUS
Status: DISCONTINUED | OUTPATIENT
Start: 2018-12-20 | End: 2018-12-21

## 2018-12-20 RX ADMIN — FAMOTIDINE 20 MG: 10 INJECTION INTRAVENOUS at 05:11

## 2018-12-20 RX ADMIN — ONDANSETRON 4 MG: 2 INJECTION INTRAMUSCULAR; INTRAVENOUS at 00:12

## 2018-12-20 RX ADMIN — FAMOTIDINE 20 MG: 10 INJECTION INTRAVENOUS at 18:02

## 2018-12-20 RX ADMIN — ONDANSETRON 4 MG: 2 INJECTION INTRAMUSCULAR; INTRAVENOUS at 09:23

## 2018-12-20 RX ADMIN — ENOXAPARIN SODIUM 40 MG: 100 INJECTION SUBCUTANEOUS at 05:09

## 2018-12-20 RX ADMIN — METOCLOPRAMIDE 10 MG: 5 INJECTION, SOLUTION INTRAMUSCULAR; INTRAVENOUS at 18:03

## 2018-12-20 RX ADMIN — INSULIN HUMAN 2 UNITS: 100 INJECTION, SOLUTION PARENTERAL at 09:16

## 2018-12-20 RX ADMIN — ACETAMINOPHEN 650 MG: 325 TABLET, FILM COATED ORAL at 00:12

## 2018-12-20 RX ADMIN — HYDROMORPHONE HYDROCHLORIDE 0.5 MG: 1 INJECTION, SOLUTION INTRAMUSCULAR; INTRAVENOUS; SUBCUTANEOUS at 05:40

## 2018-12-20 RX ADMIN — METOCLOPRAMIDE 10 MG: 5 INJECTION, SOLUTION INTRAMUSCULAR; INTRAVENOUS at 12:49

## 2018-12-20 RX ADMIN — HALOPERIDOL 5 MG: 5 TABLET ORAL at 12:49

## 2018-12-20 RX ADMIN — ONDANSETRON 4 MG: 2 INJECTION INTRAMUSCULAR; INTRAVENOUS at 18:13

## 2018-12-20 RX ADMIN — SODIUM CHLORIDE, POTASSIUM CHLORIDE, SODIUM LACTATE AND CALCIUM CHLORIDE: 600; 310; 30; 20 INJECTION, SOLUTION INTRAVENOUS at 18:38

## 2018-12-20 RX ADMIN — PROCHLORPERAZINE EDISYLATE 10 MG: 5 INJECTION INTRAMUSCULAR; INTRAVENOUS at 12:58

## 2018-12-20 RX ADMIN — HYDROMORPHONE HYDROCHLORIDE 0.5 MG: 1 INJECTION, SOLUTION INTRAMUSCULAR; INTRAVENOUS; SUBCUTANEOUS at 16:37

## 2018-12-20 RX ADMIN — INSULIN GLARGINE 20 UNITS: 100 INJECTION, SOLUTION SUBCUTANEOUS at 12:46

## 2018-12-20 RX ADMIN — HYDROMORPHONE HYDROCHLORIDE 0.5 MG: 1 INJECTION, SOLUTION INTRAMUSCULAR; INTRAVENOUS; SUBCUTANEOUS at 09:23

## 2018-12-20 RX ADMIN — SODIUM CHLORIDE, POTASSIUM CHLORIDE, SODIUM LACTATE AND CALCIUM CHLORIDE: 600; 310; 30; 20 INJECTION, SOLUTION INTRAVENOUS at 06:59

## 2018-12-20 RX ADMIN — HYDROMORPHONE HYDROCHLORIDE 0.5 MG: 1 INJECTION, SOLUTION INTRAMUSCULAR; INTRAVENOUS; SUBCUTANEOUS at 20:04

## 2018-12-20 RX ADMIN — METOCLOPRAMIDE 10 MG: 5 INJECTION, SOLUTION INTRAMUSCULAR; INTRAVENOUS at 05:09

## 2018-12-20 RX ADMIN — PROCHLORPERAZINE EDISYLATE 10 MG: 5 INJECTION INTRAMUSCULAR; INTRAVENOUS at 07:08

## 2018-12-20 RX ADMIN — PROCHLORPERAZINE EDISYLATE 10 MG: 5 INJECTION INTRAMUSCULAR; INTRAVENOUS at 20:04

## 2018-12-20 RX ADMIN — INSULIN HUMAN 3 UNITS: 100 INJECTION, SOLUTION PARENTERAL at 13:40

## 2018-12-20 RX ADMIN — ONDANSETRON 4 MG: 2 INJECTION INTRAMUSCULAR; INTRAVENOUS at 14:07

## 2018-12-20 RX ADMIN — ATORVASTATIN CALCIUM 20 MG: 20 TABLET, FILM COATED ORAL at 05:14

## 2018-12-20 ASSESSMENT — PAIN SCALES - GENERAL
PAINLEVEL_OUTOF10: 5
PAINLEVEL_OUTOF10: 8
PAINLEVEL_OUTOF10: 2
PAINLEVEL_OUTOF10: 7
PAINLEVEL_OUTOF10: 2
PAINLEVEL_OUTOF10: 8
PAINLEVEL_OUTOF10: 6
PAINLEVEL_OUTOF10: 8
PAINLEVEL_OUTOF10: 8
PAINLEVEL_OUTOF10: 3
PAINLEVEL_OUTOF10: 5
PAINLEVEL_OUTOF10: 8
PAINLEVEL_OUTOF10: 5
PAINLEVEL_OUTOF10: 5

## 2018-12-20 ASSESSMENT — ENCOUNTER SYMPTOMS
VOMITING: 1
NAUSEA: 1
POLYDIPSIA: 1
SORE THROAT: 0
STRIDOR: 0
COUGH: 0
FOCAL WEAKNESS: 0
BACK PAIN: 1
SPEECH CHANGE: 0
ABDOMINAL PAIN: 1
CHILLS: 0
MYALGIAS: 0
WEAKNESS: 1
CONSTIPATION: 0
SHORTNESS OF BREATH: 0
SPUTUM PRODUCTION: 0
HEADACHES: 0
FEVER: 0
FLANK PAIN: 1
DIZZINESS: 0
DIARRHEA: 0
SENSORY CHANGE: 0
ABDOMINAL PAIN: 0

## 2018-12-20 NOTE — CARE PLAN
Problem: Bowel/Gastric:  Goal: Normal bowel function is maintained or improved  Patient nauseous, vomiting throughout day. PRN antiemetics administered per MAR, encouraged intake of clear liquid diet.    Problem: Respiratory:  Goal: Respiratory status will improve  Patient on 2 L NC while sleeping, room air while awake. Encourage use of the IS.

## 2018-12-20 NOTE — PROGRESS NOTES
Pt care assumed from night RN, labs/ meds/ and orders reviewed, lines, drains and IV's verified, VSS, assessment completed. Pt nauseous and emesis present when awake. Medicated per PRN MAR for emesis and pain. POC discussed.

## 2018-12-20 NOTE — PROGRESS NOTES
Diabetes education: Met with patient this afternoon. Please see consult note.  Plan: Pt has had education related to diabetes multiple times. Pt was open to discussing needs at this visit.  Pt will prescriptions for Admelog solostar pens ( box of five), and strips and lancets for True metrix to test four times a day. All prescriptions need to have dx code (E10.65), directions, quantity and refills for Medicaid to cover. Pt states she has Basaglar, and pen needles at home. Please call [phone] if needs change.

## 2018-12-20 NOTE — PROGRESS NOTES
"Critical Care Progress Note    Date of admission  12/18/2018    Chief Complaint  \"28 y.o. female past medical history of type 1 diabetes, frequent DKA, gastroparesis who presented 12/18/2018 with 2 weeks of worsening nausea, vomiting, abdominal pain with overall fatigue and malaise.  Mother reports the patient also had some upper respiratory symptoms prior however this has resolved.  She presented to the emergency department and was found to be in diabetic ketoacidosis with a CO2 of 5, glucose 369, anion gap 25, WBC 15.8, hemoglobin 16.6, platelet 303.  She was given 2 L of IV fluids and started on an insulin infusion.  She is admitted to the ICU for further care of her diabetic ketoacidosis.  At this time patient complains of left upper quadrant pain, left flank pain and headache.\"    Hospital Course    12/18/2018: admitted for DKA  12/19/18: Transitioned to SSI      Interval Problem Update  Reviewed last 24 hour events:   - persistent NV, Left flank pain   - Neuro: AOx4   - HR: 80s-110s   - SBP: 100s-160s   - GI: poorly tolerating diet due to nausea and vomiting   - UOP: Adequate   - Pitt: No   - Tm: Afebrile   - Lines: Central venous catheter   - PPx: GI not indicated, DVT Lovenox   -Room air    Yesterday   - severe emesis overnight, improved with compazine. AG closed CO2 improved   - Neuro: AOx4   - HR: 80s-100s   - SBP: 100s-120s   - GI: N/V ADAT to FLD   - UOP: adequate   - Pitt: no   - Tm: afeb   - Lines: CVC   - PPx: GI pepcid, DVT lovenox   - RA    Review of Systems  Review of Systems   Constitutional: Positive for malaise/fatigue. Negative for chills and fever.   Respiratory: Negative for cough, sputum production, shortness of breath and stridor.    Cardiovascular: Negative for chest pain.   Gastrointestinal: Positive for nausea and vomiting. Negative for abdominal pain.   Genitourinary: Negative for dysuria and frequency.   Musculoskeletal: Negative for myalgias.   Skin: Negative for rash. "   Neurological: Negative for dizziness, sensory change, focal weakness and headaches.   Endo/Heme/Allergies: Positive for polydipsia.        Vital Signs for last 24 hours   Temp:  [36 °C (96.8 °F)-36.6 °C (97.9 °F)] 36.2 °C (97.2 °F)  Pulse:  [] 92  Resp:  [11-21] 20    Hemodynamic parameters for last 24 hours       Respiratory       Physical Exam   Physical Exam   Constitutional: She is oriented to person, place, and time. She appears well-developed and well-nourished. No distress.   HENT:   Head: Normocephalic and atraumatic.   Right Ear: External ear normal.   Left Ear: External ear normal.   Nose: Nose normal.   Eyes: Pupils are equal, round, and reactive to light. Conjunctivae are normal.   Neck: Neck supple. No JVD present. No tracheal deviation present.   Cardiovascular: Normal rate, regular rhythm and intact distal pulses.    Pulmonary/Chest: Breath sounds normal. No accessory muscle usage. No respiratory distress.   Abdominal: Soft. Bowel sounds are normal. She exhibits no distension. There is tenderness (mild, diffuse).   Musculoskeletal: Normal range of motion. She exhibits no tenderness or deformity.   Neurological: She is alert and oriented to person, place, and time. She exhibits normal muscle tone. Coordination normal.   Skin: Skin is warm and dry. No rash noted.   Psychiatric: She has a normal mood and affect. Her behavior is normal.   Nursing note and vitals reviewed.      Medications  Current Facility-Administered Medications   Medication Dose Route Frequency Provider Last Rate Last Dose   • prochlorperazine (COMPAZINE) injection 10 mg  10 mg Intravenous Q6HRS PRN Jeremy M Gonda, M.D.   10 mg at 12/20/18 0708   • metoclopramide (REGLAN) injection 10 mg  10 mg Intravenous Q6HRS Jeremy M Gonda, M.D.   10 mg at 12/20/18 0509   • famotidine (PEPCID) injection 20 mg  20 mg Intravenous BID Cholo Alan Jr. D.O.   20 mg at 12/20/18 0511   • enoxaparin (LOVENOX) inj 40 mg  40 mg Subcutaneous  DAILY Cholo Alan Jr. D.O.   40 mg at 12/20/18 0509   • lactated ringers infusion   Intravenous Continuous Cholo Alan Jr. D.O. 125 mL/hr at 12/20/18 0659     • Influenza Vaccine Quad pf injection 0.5 mL  0.5 mL Intramuscular Once PRN Cholo Alan Jr., D.O.       • insulin regular (HUMULIN R) injection 1-6 Units  1-6 Units Subcutaneous 4X/DAY ACHS Cholo Alan Jr., D.O.   Stopped at 12/19/18 2105    And   • glucose 4 g chewable tablet 16 g  16 g Oral Q15 MIN PRN Cholo Alan Jr. D.O.        And   • dextrose 50% (D50W) injection 25 mL  25 mL Intravenous Q15 MIN PRN Cholo Alan Jr. D.O.       • atorvastatin (LIPITOR) tablet 20 mg  20 mg Oral DAILY Adrian Sanchez M.D.   20 mg at 12/20/18 0514   • HYDROmorphone pf (DILAUDID) injection 0.5 mg  0.5 mg Intravenous Q2HRS PRN Cholo Alan Jr., D.O.   0.5 mg at 12/20/18 0540   • acetaminophen (TYLENOL) tablet 650 mg  650 mg Oral Q6HRS PRN Cholo Alan Jr., D.O.   650 mg at 12/20/18 0012   • senna-docusate (PERICOLACE or SENOKOT S) 8.6-50 MG per tablet 2 Tab  2 Tab Oral BID Adrian Sanchez M.D.   Stopped at 12/19/18 0600    And   • polyethylene glycol/lytes (MIRALAX) PACKET 1 Packet  1 Packet Oral QDAY PRN Adrian Sanchez M.D.        And   • magnesium hydroxide (MILK OF MAGNESIA) suspension 30 mL  30 mL Oral QDAY PRN Adrian Sanchez M.D.        And   • bisacodyl (DULCOLAX) suppository 10 mg  10 mg Rectal QDAY PRN Adrian Sanchez M.D.       • hydrALAZINE (APRESOLINE) injection 10 mg  10 mg Intravenous Q4HRS PRN Adrian Sanchez M.D.       • ondansetron (ZOFRAN) syringe/vial injection 4 mg  4 mg Intravenous Q4HRS PRN Adrian Sanchez M.D.   4 mg at 12/20/18 0012   • ondansetron (ZOFRAN ODT) dispertab 4 mg  4 mg Oral Q4HRS PRN Adrian Sanchez M.D.           Fluids    Intake/Output Summary (Last 24 hours) at 12/20/18 0749  Last data filed at 12/20/18 0700   Gross per 24 hour   Intake          4799.91 ml   Output              1640 ml   Net          3159.91 ml       Laboratory  Recent Labs      12/18/18   1519   ISTATAPH  7.090*   ISTATAPCO2  12.2*   ISTATAPO2  100*   ISTATATCO2  <5*   [id number]  95   ISTATARTHCO3  3.7*   ISTATARTBE  -24*   ISTATTEMP  98.2 F   ISTATFIO2  21   ISTATSPEC  Arterial   ISTATAPHTC  7.093*   [id number]  98*         Recent Labs      12/19/18   0120  12/19/18   0516  12/19/18   1110  12/20/18   0522   SODIUM  147*  148*  147*  145   POTASSIUM  2.7*  3.2*  3.8  3.7   CHLORIDE  120*  119*  121*  117*   CO2  22  24  22  23   BUN  15  14  13  10   CREATININE  0.54  0.51  0.51  0.52   MAGNESIUM  1.9  2.6*   --   2.0   PHOSPHORUS  1.3*  1.9*   --   2.8   CALCIUM  7.8*  7.6*  7.4*  8.1*     Recent Labs      12/18/18   0750  12/18/18   1240  12/18/18   1700   12/19/18   0120  12/19/18   0516  12/19/18   1110  12/20/18   0522   ALTSGPT  16   --    --    --   10   --    --   10   ASTSGOT  11*   --    --    --   10*   --    --   14   ALKPHOSPHAT  116*   --    --    --   64   --    --   68   TBILIRUBIN  0.4   --    --    --   0.3   --    --   0.5   LIPASE   --   15  32   --    --    --    --    --    GLUCOSE  369*  389*  169*   < >  152*  110*  128*  161*    < > = values in this interval not displayed.     Recent Labs      12/18/18   0750  12/19/18   0120  12/19/18   0516  12/20/18   0522   WBC  15.8*   --   9.6  5.5   NEUTSPOLYS  73.40*   --   71.30  46.80   LYMPHOCYTES  17.80*   --   17.90*  39.30   MONOCYTES  7.00   --   9.70  10.40   EOSINOPHILS  0.20   --   0.20  2.40   BASOPHILS  0.60   --   0.50  0.70   ASTSGOT  11*  10*   --   14   ALTSGPT  16  10   --   10   ALKPHOSPHAT  116*  64   --   68   TBILIRUBIN  0.4  0.3   --   0.5     Recent Labs      12/18/18   0750  12/19/18   0516  12/20/18   0522   RBC  5.75*  4.15*  4.17*   HEMOGLOBIN  16.6*  12.1  12.2   HEMATOCRIT  51.8*  35.4*  36.1*   PLATELETCT  303  206  134*       Imaging  X-Ray:  I have personally reviewed the images and compared with prior  images.    Assessment/Plan  Diabetic ketoacidosis (HCC)- (present on admission)   Assessment & Plan    cardiac monitoring  LR at maintenance infusion rate  Continue subcutaneous insulin  Continue Lantus at 20, increase to full home dose when tolerating PO     Intractable nausea and vomiting- (present on admission)   Assessment & Plan    Likely due to her underlying gastroparesis compounded by diabetic ketoacidosis  Continue scheduled IV Reglan, continue Zofran and Compazine as needed  Add scheduled haldol     Gastroparesis- (present on admission)   Assessment & Plan    Continue scheduled pepcid, Reglan, as needed Zofran and compazine  Add scheduled reglan     Diabetes type I (CMS-HCC)- (present on admission)   Assessment & Plan    Poorly controlled  Hemoglobin A1c 11.8  Multiple complications of diabetes     Leukocytosis- (present on admission)   Assessment & Plan    resolved  Monitor, low threshold to begin antibiotics     Flank pain- (present on admission)   Assessment & Plan    UA unremarkable  Lipase normal  Renal US with moderate right hydro  CT negative  Improved     Difficult intravenous access- (present on admission)   Assessment & Plan    Central venous access obtained     Dehydration- (present on admission)   Assessment & Plan    Due to diabetic ketoacidosis and urine losses  IVF        VTE:  Lovenox  Ulcer: PPI  Lines: Central Line  Ongoing indication addressed    I have performed a physical exam and reviewed and updated ROS and Plan today (12/20/2018). In review of yesterday's note (12/19/2018), there are no changes except as documented above.     Discussed patient condition and risk of morbidity and/or mortality with RN, RT, Pharmacy, Dietary, , [employer] resident, Charge nurse / hot rounds and Patient

## 2018-12-20 NOTE — CONSULTS
"Diabetes education: Met with Alis, who was sleeping but woke when name called without problem. Pt is known from previous admits.  Some question regarding pt having fast acting insulin at home. Pt's insurance changed from Apidra to Admelog and pt did not have a new prescription.   Plan: Pt has had education related to diabetes multiple times. Pt was open to discussing needs at this visit.  Pt will prescriptions for Admelog solostar pens ( box of five), and strips and lancets for True metrix to test four times a day. All prescriptions need to have dx code (E10.65), directions, quantity and refills for Medicaid to cover. Pt states she has Basaglar, and pen needles at home. Please call [phone] if needs change.        Previous visit:     Progress Notes  Date of Service: 6/28/2018 7:05 PM  Marline Cameron R.N.       Diabetes education: met with patient and mom to review diabetes management. Pt states she has all her supplies and insulin at home. Reviewed insulin pen, storage, shelf life and site rotation. Pt states she is feeling better and is eating now.  Pt hopes to go home tomorrow. Pt has type one diabetes and now that she is eating should be on both a meal bolus as well as a correction for her blood sugars. Pt uses a correction and a 1: 6 carb ratio at home. Finger sticks should be ac and hs (not every six hours) and if possible limit fast acting insulin at hs. Please call [phone] if needs change.           Consults  Date of Service: 6/21/2018 8:27 PM  Marline Cameron R.N.         Diabetes education: Pt known from previous admits. Pt has not yet been seen as pt continues to have nausea and GI recommendation is to have \"dark quiet room without frequent disturbance of patient\".  Plan: Alana CHAVIRA CDE to meet with patient tomorrow if appropriate. Please call [phone] if needs change.           Consults  Date of Service: 11/3/2015 1:37 PM  Marline Cameron R.N.      Diabetes education: Alis has hx of type one diabetes, " and is known from previous admits:                          Consults by Marline Cameron R.N. at 9/8/2015 3:59 PM        Author: Marline Cameron R.N. Service: (none) Author Type: Diabetes Health Educator       Filed: 9/8/2015 4:03 PM Note Time: 9/8/2015 3:59 PM Status: Signed       : Marline Cameron R.N. (Diabetes Health Educator)           Expand All Collapse All         Diabetes education: Pt is well known from previous admits:                     Consults by Mariah Fernández R.N. at 11/25/2014 11:22 AM      Author: Mariah Fernández R.N.  Service: (none)  Author Type: Registered Nurse      Filed: 11/25/2014 11:28 AM  Note Time: 11/25/2014 11:22 AM  Status: Signed      : Mariah Fernández R.N. (Registered Nurse)          Expand All Collapse All   Diabetes Education: Reviewed sick day management with Alis. She has a new meter at home and has seen the urine ketone strips before. We reviewed checking her urine ketones when she is sick or if her blood sugar is over 300. She has an insulin correction of 1:50>150 and verbalized understanding of correcting for ketones. She was encouraged to make up a sick day box and have every thing she needs in it including her sick day instructions. She states she has a prescription for Zofran if she gets nauseated. She was provided written instructions to refer to. She states she gets insurance January 1, 2015 and will get a doctor. She states she understands how hard it is on her to keep going into DKA and wants to start taking better care of herself.                                Consults by Marline Cameron R.N. at 11/24/2014 5:18 PM      Author: Marline Cameron R.N.  Service: (none)  Author Type: Diabetes Health Educator      Filed: 11/24/2014 5:23 PM  Note Time: 11/24/2014 5:18 PM  Status: Signed      : Marline Cameron R.N. (Diabetes Health Educator)          Expand All Collapse All   Diabetes education: Alis well known to this CDE from previous admits. Issues  "were supplies but with last admit, she reports she has insulin and strips. Handouts were given to her regarding [employer], and [place] [employer]. Please refer to consult and progress notes for 11/9/14 admission (specifically 11/13/14).  She states this time her blood sugars were 293 prior to admit but increased to over 700 when she \"got here\".  She states they normally are not that high. Asked about testing for ketones and adding more insulin and she states she was never taught that.  Pt was just receiving medication for nausea. Will defer education until tomorrow or Wednesday.      Plan: discuss ketones, insulin and DKA prior to discharge when patient more appropriate. Please call [phone] if needs change.             Plan: Pt has just been moved to  and is currently sleeping. Questions regarding insulin coverage from  note. Mom not available. Pt was on Novolog and Lantus, but her insurance covers Apidra. There was some question about prior auth. Not sure if she cannot tolerate Apidra or did not know to have it changed. CDE will follow up with her tomorrow. Please call [phone] if needs change.                                                Author: Marline Cameron R.N. Service: (none) Author Type: Diabetes Health Educator       Filed: 9/9/2015 1:33 PM Note Time: 9/9/2015 1:17 PM Status: Signed     : Marline Cameron R.N. (Diabetes Health Educator)         Expand All Collapse All   Diabetes education: Met with Alis regarding insulin needs. Pt currently has [employer] which only covers Lantus and Apidra insulin. Pt states she was told by the [facility] pharmacy, they would cover Levemir and Novolog only for a short period of time and then would need a prior auth. Pt has used Lantus in the hospital without problems (currently on 15 units every 12 hours). She has never used Apidra (which has more of a \"tale\", and may work better for her gastroparesis).  Pt is struggling with eating. She took her " How Severe Is Your Skin Lesion?: moderate Has Your Skin Lesion Been Treated?: not been treated insulin, ate breakfast well, and then threw up. Pt has not received any Humalog coverage since yesterday at 1658. She normally follows a 1:6 carbohydrate ratio with a correction of 1 unit for every 50 mg/dl above 150 (scale starting at 200) for meals only. She states she was taking 25 units of Levermir in am and 22 units in pm. Taking much less here and still with lower blood sugars : : 64 ( 76,and 85 after treating the low) and 91. 9/8: 110, 184, 221 and 169. Pt may benefit with decreasing pm Lantus and when able to tolerate food changing sliding scale coverage to meals only, starting at 200 and adding carbohydrate ratio of 1:6 again meals only.   Plan: As above as well as at discharge patient will need a prescription for Apidra and Lantus insulins to get coverage from her current insurance (pt will be changing medicaid in October). Please call [phone] if needs change.                 Pt is now on Medicaid HMO not [employer]. Not sure what are her concerns this admit, as she is sleeping. Pt is getting 25 units of Lantus HS and Humalog sliding scale every six hours. Pt would benefit from having coverage ac and hs when she begins to eat . At this point she has not required any Humalo, 88 and 131.   Plan: CDE will follow up tomorrow. Please call [phone] if needs change.                Progress Notes  Date of Service: 2015 3:12 PM  Marline Cameron R.N.      Diabetes education: Met with Alis, who states she is feeling much better. Asked what happened, she states she was fine at work, came home, began to eat, and after first few bites became sick. States now with insurance she has her insulin (pens) and supplies and takes her insulin, checks her blood sugar, checks for ketones when sick, rotates sites, primes pen, holds at sight for 10 seconds, and does not use pen longer than 28 days.  Reviewed sick day, she knows to come in if not able to keep fluid down ( was able at first than later not ).  Pt will be  Is This A New Presentation, Or A Follow-Up?: Skin Lesion setting up an appointment with an endocrinologist (had to clear up coverage with insurance).  Questions answered .  Please call [phone] if needs change.

## 2018-12-20 NOTE — PROCEDURES
Vascular Access Team    Date of Insertion: 12/20/18  Arm Circumference: 30.5  Internal length: 14  External Length: 0  Vein Occupancy %: 34  Reason for Midline: access  Labs: WBC 5.5, , BUN 10, Cr 0.52, GFR >60, INR not collected     Orders confirmed, vessel patency confirmed with ultrasound. Risks and benefits of procedure explained to patient and education regarding line associated bloodstream infections provided. Questions answered.     Power Midline placed in LUE per MD order with ultrasound guidance, initial arm circumference 30.5 cm. 4 Fr, single lumen Power Midline placed in basilic vein after 1 attempt(s). 2 cc's of 1% lidocaine injected intradermally, 21 gauge microintroducer needle and modified Seldinger technique used. 14 cm catheter inserted with good blood return. Secured at 0 cm marker. Each lumen flushed without resistance with 10 mL 0.9% normal saline. Midline secured with Biopatch and Tegaderm.     Midline placement is confirmed by nurse using ultrasound and ability to flush and draw blood. Midline is appropriate for use at this time.  No X-ray is needed for placement confirmation. Pt tolerated procedure well.  Patient condition relayed to unit RN or ordering physician via this post procedure note in the EMR.     Ultrasound images uploaded to PACS and viewable in the EMR - yes  Ultrasound imaged printed and placed in paper chart - no     BARD Power Midline ref # [id number], Lot # [id number]

## 2018-12-20 NOTE — PROGRESS NOTES
Critical Care Medicine Admitting History and Physical    Note Author: Adrian Sanchez M.D.         Name Alis Sparks     1990   Age/Sex 28 y.o. female   MRN [id number]   Code Status Full      After 5PM or if no immediate response to page, please call for cross-coverage  Attending/Team: Dr Alan/José Manuel See Patient List for primary contact information  Call [phone] to page    1st Call - Day Intern (R1):   Daniel           Chief Complaint:  Nausea/Vomiting     HPI:              Pt is a 27 yo F presenting with severe N/V this morning. Pt has a PMH of DM1 (most recent Ha1c on 11/10 was 11.8; hx of peripheral neuropathy, gastroparesis, autonomic dysfunction, and nephropathy), inappropriate sinus tach, and medical noncompliance. Pt developed nausea ~2 weeks ago, however she has a hx of gastroparesis and was resistant to see a healthcare provider. Sometime in the recent past mother notes daughters insurance no longer covered her rapid acting insulin glulisine. Mother notes a near yearly history of DKA admissions since her diagnosis at age 17. Her symptoms culminated the morning of presentation with severe N/V when her mother prompted her to present to the ED.              In the ED pt was noted to have sugars in 400s and slight leukocytosis. Venous ABG showed pH 6.9. Beta hydroxybutyric acid was >8. Bicarb was <5 and AG was 25. Pt was given LR bolus and started on insulin drip. Pt was then admitted to the ICU for further mgmt of her DKA.    Interval Problem Daily Status Update  (24 hours)   -Gap remains closed, DKA resolved  -Continue 20 units Lantus and QAC insulin with SS as pt cant tolerate diet yet  -Continue IVF via LR at 125  -Lyte abnormalities resolved, CTM with daily labs  -Switch hourly accuchecks to QAC and nightly  -IV Zofran, Reglan, and compazine for N/V, will add Haldol as well, monitor for AE  -Attempt to advance to diabetic diet    Review of Systems   Constitutional:  Positive for malaise/fatigue. Negative for chills and fever.   HENT: Negative for congestion and sore throat.    Respiratory: Negative for cough and shortness of breath.    Cardiovascular: Negative for chest pain and leg swelling.   Gastrointestinal: Positive for abdominal pain, nausea and vomiting. Negative for constipation and diarrhea.   Genitourinary: Positive for flank pain. Negative for dysuria and frequency.   Musculoskeletal: Positive for back pain.   Neurological: Positive for weakness. Negative for sensory change, speech change and focal weakness.       Consultants/Specialty  Critical Care  PCP: Navi Huber M.D.    Disposition  Inpatient    Quality Measures  Quality-Core Measures   Reviewed items::  EKG reviewed, Labs reviewed, Radiology images reviewed and Medications reviewed  Pitt catheter::  No Pitt  DVT prophylaxis pharmacological::  Enoxaparin (Lovenox)      Physical Exam       Vitals:    12/20/18 0700 12/20/18 0800 12/20/18 0900 12/20/18 1000   BP:       Pulse: (!) 107 88 85 89   Resp: (!) 27 20 16 15   Temp:  36.3 °C (97.3 °F)  36.6 °C (97.8 °F)   TempSrc:  Temporal  Temporal   SpO2: 99% 96% 97% 94%   Weight:       Height:         Body mass index is 31.48 kg/m².    Oxygen Therapy:  Pulse Oximetry: 94 %, O2 (LPM): 2, O2 Delivery: Silicone Nasal Cannula    Physical Exam   Constitutional: She is oriented to person, place, and time and well-developed, well-nourished, and in no distress.   HENT:   Head: Normocephalic and atraumatic.   Eyes: Pupils are equal, round, and reactive to light.   Cardiovascular: Regular rhythm.  Tachycardia present.    No murmur heard.  Pulmonary/Chest: Effort normal and breath sounds normal.   Abdominal: Soft. She exhibits no distension. There is tenderness.   Musculoskeletal: She exhibits no edema or tenderness.   Neurological: She is alert and oriented to person, place, and time. GCS score is 15.   Skin: Skin is warm and dry.         Lab Data Review:              12/19/2018  1:11 PM    Recent Labs      12/19/18   0120  12/19/18   0516  12/19/18   1110  12/20/18   0522   SODIUM  147*  148*  147*  145   POTASSIUM  2.7*  3.2*  3.8  3.7   CHLORIDE  120*  119*  121*  117*   CO2  22  24  22  23   BUN  15  14  13  10   CREATININE  0.54  0.51  0.51  0.52   MAGNESIUM  1.9  2.6*   --   2.0   PHOSPHORUS  1.3*  1.9*   --   2.8   CALCIUM  7.8*  7.6*  7.4*  8.1*       Recent Labs      12/18/18   0750  12/18/18   1240  12/18/18   1700   12/19/18   0120  12/19/18   0516  12/19/18   1110  12/20/18   0522   ALTSGPT  16   --    --    --   10   --    --   10   ASTSGOT  11*   --    --    --   10*   --    --   14   ALKPHOSPHAT  116*   --    --    --   64   --    --   68   TBILIRUBIN  0.4   --    --    --   0.3   --    --   0.5   LIPASE   --   15  32   --    --    --    --    --    GLUCOSE  369*  389*  169*   < >  152*  110*  128*  161*    < > = values in this interval not displayed.       Recent Labs      12/18/18   0750  12/19/18   0516  12/20/18   0522   RBC  5.75*  4.15*  4.17*   HEMOGLOBIN  16.6*  12.1  12.2   HEMATOCRIT  51.8*  35.4*  36.1*   PLATELETCT  303  206  134*       Recent Labs      12/18/18   0750  12/19/18   0120  12/19/18   0516  12/20/18   0522   WBC  15.8*   --   9.6  5.5   NEUTSPOLYS  73.40*   --   71.30  46.80   LYMPHOCYTES  17.80*   --   17.90*  39.30   MONOCYTES  7.00   --   9.70  10.40   EOSINOPHILS  0.20   --   0.20  2.40   BASOPHILS  0.60   --   0.50  0.70   ASTSGOT  11*  10*   --   14   ALTSGPT  16  10   --   10   ALKPHOSPHAT  116*  64   --   68   TBILIRUBIN  0.4  0.3   --   0.5           Assessment/Plan     Diabetic ketoacidosis (HCC)- (present on admission)   Assessment & Plan    -Likely 2/2 medical noncompliance  -Gap remains closed, ketoacidosis appears to have resolved  -Continue 20 units Lantus daily and QAC insulin with SS as pt is still not tolerating diet due to N/V  -Daily BMPs and replete ltyes appropriately   -Continue IVF via LR at 125  -QAC and  nightly accuchecks and hypoglycemia protocol   -CTM on tele with cardiac monitor     Intractable nausea and vomiting- (present on admission)   Assessment & Plan    -Likely due to DKA and gastroparesis  -Only mild improvement with compazine, Reglan, and zofran  -Started on Haldol, had success in past but later had psychosis from this after prolonged usage, monitor closely and DC if this recurrs     Gastroparesis- (present on admission)   Assessment & Plan    -Chronic sequela of uncontrolled DM1  -Continue compazine, Reglan, and zofran  -Consider agent other than Reglan on discharge given long term given neurological risk in young pt      Diabetes type I (CMS-HCC)- (present on admission)   Assessment & Plan    -Poor control, most recent HA1c 11.8  -Mother says possibly ran out of rapid acting insulin  -Multiple previous DKA admissions  -Diabetic counseling via diabetic nurse  -Outpt endo f/u     Flank pain- (present on admission)   Assessment & Plan    -Notes L flank and CVA pain  -UA not suggestive of infxn, renal US shows normal L kidney and R kidney with mild hydronephrosis?  -CTM I/Os closely   -Tylenol and Dilaudid for pain

## 2018-12-21 PROBLEM — J18.9 PNEUMONIA: Status: ACTIVE | Noted: 2018-12-21

## 2018-12-21 LAB
ALBUMIN SERPL BCP-MCNC: 3 G/DL (ref 3.2–4.9)
ALBUMIN/GLOB SERPL: 1.7 G/DL
ALP SERPL-CCNC: 74 U/L (ref 30–99)
ALT SERPL-CCNC: 9 U/L (ref 2–50)
ANION GAP SERPL CALC-SCNC: 8 MMOL/L (ref 0–11.9)
AST SERPL-CCNC: 16 U/L (ref 12–45)
BASOPHILS # BLD AUTO: 0.6 % (ref 0–1.8)
BASOPHILS # BLD: 0.03 K/UL (ref 0–0.12)
BILIRUB SERPL-MCNC: 0.6 MG/DL (ref 0.1–1.5)
BUN SERPL-MCNC: 7 MG/DL (ref 8–22)
CALCIUM SERPL-MCNC: 7.9 MG/DL (ref 8.5–10.5)
CHLORIDE SERPL-SCNC: 108 MMOL/L (ref 96–112)
CO2 SERPL-SCNC: 25 MMOL/L (ref 20–33)
CREAT SERPL-MCNC: 0.41 MG/DL (ref 0.5–1.4)
EOSINOPHIL # BLD AUTO: 0.11 K/UL (ref 0–0.51)
EOSINOPHIL NFR BLD: 2.3 % (ref 0–6.9)
ERYTHROCYTE [DISTWIDTH] IN BLOOD BY AUTOMATED COUNT: 42.8 FL (ref 35.9–50)
GLOBULIN SER CALC-MCNC: 1.8 G/DL (ref 1.9–3.5)
GLUCOSE BLD-MCNC: 172 MG/DL (ref 65–99)
GLUCOSE BLD-MCNC: 197 MG/DL (ref 65–99)
GLUCOSE BLD-MCNC: 224 MG/DL (ref 65–99)
GLUCOSE BLD-MCNC: 256 MG/DL (ref 65–99)
GLUCOSE SERPL-MCNC: 209 MG/DL (ref 65–99)
HCT VFR BLD AUTO: 36.2 % (ref 37–47)
HGB BLD-MCNC: 12.1 G/DL (ref 12–16)
IMM GRANULOCYTES # BLD AUTO: 0.01 K/UL (ref 0–0.11)
IMM GRANULOCYTES NFR BLD AUTO: 0.2 % (ref 0–0.9)
LYMPHOCYTES # BLD AUTO: 1.57 K/UL (ref 1–4.8)
LYMPHOCYTES NFR BLD: 32.3 % (ref 22–41)
MCH RBC QN AUTO: 29.1 PG (ref 27–33)
MCHC RBC AUTO-ENTMCNC: 33.4 G/DL (ref 33.6–35)
MCV RBC AUTO: 87 FL (ref 81.4–97.8)
MONOCYTES # BLD AUTO: 0.41 K/UL (ref 0–0.85)
MONOCYTES NFR BLD AUTO: 8.4 % (ref 0–13.4)
NEUTROPHILS # BLD AUTO: 2.73 K/UL (ref 2–7.15)
NEUTROPHILS NFR BLD: 56.2 % (ref 44–72)
NRBC # BLD AUTO: 0 K/UL
NRBC BLD-RTO: 0 /100 WBC
PLATELET # BLD AUTO: 112 K/UL (ref 164–446)
PMV BLD AUTO: 10.7 FL (ref 9–12.9)
POTASSIUM SERPL-SCNC: 3.4 MMOL/L (ref 3.6–5.5)
PROT SERPL-MCNC: 4.8 G/DL (ref 6–8.2)
RBC # BLD AUTO: 4.16 M/UL (ref 4.2–5.4)
SODIUM SERPL-SCNC: 141 MMOL/L (ref 135–145)
TEST NAME 95000: ABNORMAL
WBC # BLD AUTO: 4.9 K/UL (ref 4.8–10.8)

## 2018-12-21 PROCEDURE — 700105 HCHG RX REV CODE 258: Performed by: STUDENT IN AN ORGANIZED HEALTH CARE EDUCATION/TRAINING PROGRAM

## 2018-12-21 PROCEDURE — 700111 HCHG RX REV CODE 636 W/ 250 OVERRIDE (IP): Performed by: INTERNAL MEDICINE

## 2018-12-21 PROCEDURE — 99233 SBSQ HOSP IP/OBS HIGH 50: CPT | Mod: GC | Performed by: INTERNAL MEDICINE

## 2018-12-21 PROCEDURE — 36415 COLL VENOUS BLD VENIPUNCTURE: CPT

## 2018-12-21 PROCEDURE — 770006 HCHG ROOM/CARE - MED/SURG/GYN SEMI*

## 2018-12-21 PROCEDURE — 85025 COMPLETE CBC W/AUTO DIFF WBC: CPT

## 2018-12-21 PROCEDURE — 700111 HCHG RX REV CODE 636 W/ 250 OVERRIDE (IP): Performed by: STUDENT IN AN ORGANIZED HEALTH CARE EDUCATION/TRAINING PROGRAM

## 2018-12-21 PROCEDURE — 700102 HCHG RX REV CODE 250 W/ 637 OVERRIDE(OP): Performed by: INTERNAL MEDICINE

## 2018-12-21 PROCEDURE — 82962 GLUCOSE BLOOD TEST: CPT | Mod: 91

## 2018-12-21 PROCEDURE — 80053 COMPREHEN METABOLIC PANEL: CPT

## 2018-12-21 PROCEDURE — 700111 HCHG RX REV CODE 636 W/ 250 OVERRIDE (IP)

## 2018-12-21 RX ORDER — ONDANSETRON 2 MG/ML
INJECTION INTRAMUSCULAR; INTRAVENOUS
Status: DISPENSED
Start: 2018-12-21 | End: 2018-12-21

## 2018-12-21 RX ORDER — AZITHROMYCIN 250 MG/1
500 TABLET, FILM COATED ORAL DAILY
Status: DISCONTINUED | OUTPATIENT
Start: 2018-12-21 | End: 2018-12-21

## 2018-12-21 RX ORDER — HYDROMORPHONE HYDROCHLORIDE 2 MG/ML
INJECTION, SOLUTION INTRAMUSCULAR; INTRAVENOUS; SUBCUTANEOUS
Status: DISPENSED
Start: 2018-12-21 | End: 2018-12-22

## 2018-12-21 RX ORDER — GUAIFENESIN 600 MG/1
600 TABLET, EXTENDED RELEASE ORAL EVERY 12 HOURS
Status: DISCONTINUED | OUTPATIENT
Start: 2018-12-21 | End: 2018-12-29 | Stop reason: HOSPADM

## 2018-12-21 RX ORDER — AZITHROMYCIN 250 MG/1
250 TABLET, FILM COATED ORAL DAILY
Status: DISCONTINUED | OUTPATIENT
Start: 2018-12-22 | End: 2018-12-21

## 2018-12-21 RX ORDER — HYDROMORPHONE HYDROCHLORIDE 2 MG/ML
INJECTION, SOLUTION INTRAMUSCULAR; INTRAVENOUS; SUBCUTANEOUS
Status: COMPLETED
Start: 2018-12-21 | End: 2018-12-21

## 2018-12-21 RX ORDER — HYDROMORPHONE HYDROCHLORIDE 2 MG/ML
0.5 INJECTION, SOLUTION INTRAMUSCULAR; INTRAVENOUS; SUBCUTANEOUS
Status: DISCONTINUED | OUTPATIENT
Start: 2018-12-21 | End: 2018-12-23

## 2018-12-21 RX ORDER — ONDANSETRON 2 MG/ML
INJECTION INTRAMUSCULAR; INTRAVENOUS
Status: COMPLETED
Start: 2018-12-21 | End: 2018-12-21

## 2018-12-21 RX ORDER — HYDROMORPHONE HYDROCHLORIDE 1 MG/ML
0.5 INJECTION, SOLUTION INTRAMUSCULAR; INTRAVENOUS; SUBCUTANEOUS
Status: DISCONTINUED | OUTPATIENT
Start: 2018-12-21 | End: 2018-12-21

## 2018-12-21 RX ORDER — INSULIN GLARGINE 100 [IU]/ML
25 INJECTION, SOLUTION SUBCUTANEOUS
Status: DISCONTINUED | OUTPATIENT
Start: 2018-12-22 | End: 2018-12-23

## 2018-12-21 RX ORDER — POTASSIUM CHLORIDE 20 MEQ/1
40 TABLET, EXTENDED RELEASE ORAL DAILY
Status: DISPENSED | OUTPATIENT
Start: 2018-12-21 | End: 2018-12-23

## 2018-12-21 RX ORDER — AZITHROMYCIN 250 MG/1
250 TABLET, FILM COATED ORAL DAILY
Status: DISCONTINUED | OUTPATIENT
Start: 2018-12-21 | End: 2018-12-21

## 2018-12-21 RX ADMIN — METOCLOPRAMIDE 10 MG: 5 INJECTION, SOLUTION INTRAMUSCULAR; INTRAVENOUS at 01:20

## 2018-12-21 RX ADMIN — ONDANSETRON 4 MG: 2 INJECTION INTRAMUSCULAR; INTRAVENOUS at 21:16

## 2018-12-21 RX ADMIN — INSULIN HUMAN 1 UNITS: 100 INJECTION, SOLUTION PARENTERAL at 12:08

## 2018-12-21 RX ADMIN — INSULIN GLARGINE 20 UNITS: 100 INJECTION, SOLUTION SUBCUTANEOUS at 07:23

## 2018-12-21 RX ADMIN — FAMOTIDINE 20 MG: 10 INJECTION INTRAVENOUS at 05:39

## 2018-12-21 RX ADMIN — METOCLOPRAMIDE 10 MG: 5 INJECTION, SOLUTION INTRAMUSCULAR; INTRAVENOUS at 17:29

## 2018-12-21 RX ADMIN — ONDANSETRON 4 MG: 2 INJECTION INTRAMUSCULAR; INTRAVENOUS at 16:46

## 2018-12-21 RX ADMIN — AZITHROMYCIN FOR INJECTION INJECTION, POWDER, LYOPHILIZED, FOR SOLUTION 500 MG: 500 INJECTION INTRAVENOUS at 16:41

## 2018-12-21 RX ADMIN — ONDANSETRON 4 MG: 2 INJECTION INTRAMUSCULAR; INTRAVENOUS at 12:13

## 2018-12-21 RX ADMIN — PROCHLORPERAZINE EDISYLATE 10 MG: 5 INJECTION INTRAMUSCULAR; INTRAVENOUS at 23:11

## 2018-12-21 RX ADMIN — FAMOTIDINE 20 MG: 10 INJECTION INTRAVENOUS at 17:29

## 2018-12-21 RX ADMIN — ONDANSETRON 4 MG: 2 INJECTION INTRAMUSCULAR; INTRAVENOUS at 07:37

## 2018-12-21 RX ADMIN — INSULIN HUMAN 2 UNITS: 100 INJECTION, SOLUTION PARENTERAL at 17:33

## 2018-12-21 RX ADMIN — HYDROMORPHONE HYDROCHLORIDE 0.5 MG: 2 INJECTION INTRAMUSCULAR; INTRAVENOUS; SUBCUTANEOUS at 12:14

## 2018-12-21 RX ADMIN — HYDROMORPHONE HYDROCHLORIDE 0.5 MG: 2 INJECTION INTRAMUSCULAR; INTRAVENOUS; SUBCUTANEOUS at 21:41

## 2018-12-21 RX ADMIN — SODIUM CHLORIDE, POTASSIUM CHLORIDE, SODIUM LACTATE AND CALCIUM CHLORIDE: 600; 310; 30; 20 INJECTION, SOLUTION INTRAVENOUS at 05:31

## 2018-12-21 RX ADMIN — INSULIN HUMAN 3 UNITS: 100 INJECTION, SOLUTION PARENTERAL at 07:21

## 2018-12-21 RX ADMIN — INSULIN HUMAN 1 UNITS: 100 INJECTION, SOLUTION PARENTERAL at 21:26

## 2018-12-21 RX ADMIN — HYDROMORPHONE HYDROCHLORIDE 0.5 MG: 2 INJECTION INTRAMUSCULAR; INTRAVENOUS; SUBCUTANEOUS at 07:37

## 2018-12-21 RX ADMIN — HYDROMORPHONE HYDROCHLORIDE 0.5 MG: 2 INJECTION INTRAMUSCULAR; INTRAVENOUS; SUBCUTANEOUS at 16:45

## 2018-12-21 RX ADMIN — METOCLOPRAMIDE 10 MG: 5 INJECTION, SOLUTION INTRAMUSCULAR; INTRAVENOUS at 12:10

## 2018-12-21 ASSESSMENT — ENCOUNTER SYMPTOMS
MYALGIAS: 0
ABDOMINAL PAIN: 1
SINUS PAIN: 0
SENSORY CHANGE: 0
DEPRESSION: 0
FEVER: 0
TINGLING: 0
DOUBLE VISION: 0
BLURRED VISION: 0
NAUSEA: 1
SPUTUM PRODUCTION: 0
SORE THROAT: 0
NERVOUS/ANXIOUS: 0
VOMITING: 1
BACK PAIN: 0
COUGH: 0
HEADACHES: 0
DIAPHORESIS: 0
CHILLS: 0
PALPITATIONS: 0

## 2018-12-21 ASSESSMENT — PAIN SCALES - GENERAL
PAINLEVEL_OUTOF10: 7
PAINLEVEL_OUTOF10: 8
PAINLEVEL_OUTOF10: 8

## 2018-12-21 NOTE — PROGRESS NOTES
Seen  By MD, still c/o right flank pain and persistent NV to liquid vomitus, prn meds per mar, POC discussed, needs attended.

## 2018-12-21 NOTE — PROGRESS NOTES
Internal Medicine Interval Note  Note Author: Shiraz Adame M.D.     Name Alis Sparks     1990   Age/Sex 28 y.o. female   MRN [id number]   Code Status FULL     After 5PM or if no immediate response to page, please call for cross-coverage  Attending/Team: Dr. Atkins/Jose Enrique See Patient List for primary contact information  Call [phone] to page    1st Call - Day Intern (R1):   Dr. Adame 2nd Call - Day Sr. Resident (R2/R3):   Dr. Marcano         Reason for interval visit  (Principal Problem)   Nausea, vomiting      Interval Problem Daily Status Update  (24 hours, problem oriented, brief subjective history, new lab/imaging data pertinent to that problem)   No acute events overnight. Patient states she feels better than when she came in. She still c/o nausea and inability to take oral meds. She also reports left sided abdominal pain that is sharp and non radiating.    Review of Systems   Constitutional: Negative for chills, diaphoresis and fever.   HENT: Negative for congestion, sinus pain and sore throat.    Eyes: Negative for blurred vision and double vision.   Respiratory: Negative for cough and sputum production.    Cardiovascular: Negative for chest pain, palpitations and leg swelling.   Gastrointestinal: Positive for abdominal pain (on the left side), nausea and vomiting.   Genitourinary: Negative for dysuria and urgency.   Musculoskeletal: Negative for back pain and myalgias.   Skin: Negative for itching and rash.   Neurological: Negative for tingling, sensory change and headaches.   Psychiatric/Behavioral: Negative for depression. The patient is not nervous/anxious.        Disposition/Barriers to discharge:   None    Consultants/Specialty  Diabetes Health Educator    PCP: Navi Huber M.D.      Quality Measures  Quality-Core Measures   Reviewed items::  Labs reviewed, Medications reviewed and Radiology images reviewed  Pitt catheter::  No Pitt  DVT  prophylaxis pharmacological::  Enoxaparin (Lovenox)          Physical Exam       Vitals:    12/21/18 0400 12/21/18 0500 12/21/18 0800 12/21/18 1600   BP: (!) 179/115 150/94 152/87 144/90   Pulse: 90 76 79 (!) 102   Resp: 18 18 18 18   Temp: 36.2 °C (97.1 °F)  36.1 °C (96.9 °F) 36.4 °C (97.6 °F)   TempSrc: Temporal  Temporal Temporal   SpO2: 92%  90% 92%   Weight:       Height:         Body mass index is 31.48 kg/m².    Oxygen Therapy:  Pulse Oximetry: 92 %, O2 (LPM): 2, O2 Delivery: Silicone Nasal Cannula    Physical Exam   Constitutional: She is oriented to person, place, and time and well-developed, well-nourished, and in no distress.   HENT:   Head: Normocephalic and atraumatic.   Eyes: Pupils are equal, round, and reactive to light. No scleral icterus.   Neck: Normal range of motion. Neck supple.   Cardiovascular: Normal rate, regular rhythm and normal heart sounds.    Pulmonary/Chest: Effort normal and breath sounds normal. No respiratory distress.   Abdominal: Soft. Bowel sounds are normal. She exhibits no distension. There is tenderness (in the LUQ and discomfort in the epigastric region).   Musculoskeletal: Normal range of motion. She exhibits no edema.   Neurological: She is alert and oriented to person, place, and time.   Skin: Skin is warm. No erythema.   Psychiatric: Mood and affect normal.             Assessment/Plan     Pneumonia   Assessment & Plan    - h/o productive cough with sputum production  - CT shows small bilateral pleural effusions and patchy bilateral lower lobe airspace opacities may be infectious/inflammatory  - Normal WBC, no fever  - Since infection will worsen her condition, will treat.    Plan:  - Azithromycin IV started; will change to oral after nausea resolution  - Repeat CXR tomorrow after IVF  - Guaifenesin for cough     Diabetic ketoacidosis (HCC)- (present on admission)   Assessment & Plan    - Likely secondary to medical noncompliance  - Stable for floor    Plan:   - Lantus 25  U  - ISS and accuchecks  - Hypoglycemia protocol  - Metoclopramide  - Electrolyte repletion  - CTM on tele with cardiac monitor     Intractable nausea and vomiting- (present on admission)   Assessment & Plan    - Likely due to DKA and gastroparesis  - Continue scheduled IV Reglan, continue Zofran and Compazine as needed     Gastroparesis- (present on admission)   Assessment & Plan    - Likely due to uncontrolled type 1 diabetes  - Continue home reglan  - Consider other agent long term given neurological risk in young pt     Diabetes type I (CMS-[facility])- (present on admission)   Assessment & Plan    - Poorly controlled, HbA1c 11.8; likely due to non compliance  - Mother states pt possibly ran out of rapid acting insulin due to insurance problems  - Multiple previous DKA admissions  - Diabetes education  - Continue Lantus 25U and ISS  - Hypoglycemic protocol     Dehydration- (present on admission)   Assessment & Plan    - Due to diabetic ketoacidosis and urine losses  - Continue IV hydration

## 2018-12-21 NOTE — PROGRESS NOTES
Pt arrived on the floor with belongings alongside, accepted care . Pt is asleep, easy to awaken and responds to questions, speech is clear,  shows no signs of distress. Negative for headache, Pt is breathing normally. No SOB, no chest pain. No needs at this time. POC discussed. Bed in low position call bell within reach, half side rails up, Bed frame alarm on. Pt resting quietly. Will continue to assess.

## 2018-12-21 NOTE — PROGRESS NOTES
Report called to [facility]PU bed 9. All questions answered. Pt aware of transfer and all belongings packed.

## 2018-12-21 NOTE — CARE PLAN
Problem: Knowledge Deficit  Goal: Knowledge of disease process/condition, treatment plan, diagnostic tests, and medications will improve    Intervention: Explain information regarding disease process/condition, treatment plan, diagnostic tests, and medications and document in education  Prn meds for nausea, adjust insulin and Iv antibiotics to be initiated      Problem: Pain Management  Goal: Pain level will decrease to patient's comfort goal    Intervention: Follow pain managment plan developed in collaboration with patient and Interdisciplinary Team  Prn meds per MAR

## 2018-12-21 NOTE — CARE PLAN
Problem: Bowel/Gastric:  Goal: Normal bowel function is maintained or improved    Intervention: Educate patient and significant other/support system about diet, fluid intake, medications and activity to promote bowel function  Working with MD's regarding poor intake and continued emesis      Problem: Pain Management  Goal: Pain level will decrease to patient's comfort goal    Intervention: Follow pain managment plan developed in collaboration with patient and Interdisciplinary Team  Assessing and monitoring.       Problem: Psychosocial Needs:  Goal: Level of anxiety will decrease    Intervention: Identify and develop with patient strategies to cope with anxiety triggers  Emotional support offered and provided      Problem: Mobility  Goal: Risk for activity intolerance will decrease    Intervention: Assess and monitor signs of activity intolerance  Pt refused, continues to have emesis and feeling overall poorly

## 2018-12-22 ENCOUNTER — APPOINTMENT (OUTPATIENT)
Dept: RADIOLOGY | Facility: MEDICAL CENTER | Age: 28
DRG: 637 | End: 2018-12-22
Attending: STUDENT IN AN ORGANIZED HEALTH CARE EDUCATION/TRAINING PROGRAM
Payer: MEDICAID

## 2018-12-22 LAB
ALBUMIN SERPL BCP-MCNC: 3.1 G/DL (ref 3.2–4.9)
ALBUMIN/GLOB SERPL: 1.6 G/DL
ALP SERPL-CCNC: 84 U/L (ref 30–99)
ALT SERPL-CCNC: 12 U/L (ref 2–50)
ANION GAP SERPL CALC-SCNC: 10 MMOL/L (ref 0–11.9)
AST SERPL-CCNC: 12 U/L (ref 12–45)
BASOPHILS # BLD AUTO: 0.2 % (ref 0–1.8)
BASOPHILS # BLD: 0.01 K/UL (ref 0–0.12)
BILIRUB SERPL-MCNC: 0.6 MG/DL (ref 0.1–1.5)
BUN SERPL-MCNC: 6 MG/DL (ref 8–22)
CALCIUM SERPL-MCNC: 8.3 MG/DL (ref 8.5–10.5)
CHLORIDE SERPL-SCNC: 103 MMOL/L (ref 96–112)
CO2 SERPL-SCNC: 28 MMOL/L (ref 20–33)
CREAT SERPL-MCNC: 0.44 MG/DL (ref 0.5–1.4)
EOSINOPHIL # BLD AUTO: 0.06 K/UL (ref 0–0.51)
EOSINOPHIL NFR BLD: 1.1 % (ref 0–6.9)
ERYTHROCYTE [DISTWIDTH] IN BLOOD BY AUTOMATED COUNT: 39.9 FL (ref 35.9–50)
GLOBULIN SER CALC-MCNC: 1.9 G/DL (ref 1.9–3.5)
GLUCOSE BLD-MCNC: 177 MG/DL (ref 65–99)
GLUCOSE BLD-MCNC: 228 MG/DL (ref 65–99)
GLUCOSE BLD-MCNC: 236 MG/DL (ref 65–99)
GLUCOSE BLD-MCNC: 308 MG/DL (ref 65–99)
GLUCOSE SERPL-MCNC: 184 MG/DL (ref 65–99)
HCT VFR BLD AUTO: 35.5 % (ref 37–47)
HGB BLD-MCNC: 12.3 G/DL (ref 12–16)
IMM GRANULOCYTES # BLD AUTO: 0.02 K/UL (ref 0–0.11)
IMM GRANULOCYTES NFR BLD AUTO: 0.4 % (ref 0–0.9)
LYMPHOCYTES # BLD AUTO: 1.43 K/UL (ref 1–4.8)
LYMPHOCYTES NFR BLD: 26.2 % (ref 22–41)
MCH RBC QN AUTO: 29.4 PG (ref 27–33)
MCHC RBC AUTO-ENTMCNC: 34.6 G/DL (ref 33.6–35)
MCV RBC AUTO: 84.9 FL (ref 81.4–97.8)
MONOCYTES # BLD AUTO: 0.54 K/UL (ref 0–0.85)
MONOCYTES NFR BLD AUTO: 9.9 % (ref 0–13.4)
NEUTROPHILS # BLD AUTO: 3.4 K/UL (ref 2–7.15)
NEUTROPHILS NFR BLD: 62.2 % (ref 44–72)
NRBC # BLD AUTO: 0 K/UL
NRBC BLD-RTO: 0 /100 WBC
PLATELET # BLD AUTO: 127 K/UL (ref 164–446)
PMV BLD AUTO: 10.5 FL (ref 9–12.9)
POTASSIUM SERPL-SCNC: 3.3 MMOL/L (ref 3.6–5.5)
PROT SERPL-MCNC: 5 G/DL (ref 6–8.2)
RBC # BLD AUTO: 4.18 M/UL (ref 4.2–5.4)
SODIUM SERPL-SCNC: 141 MMOL/L (ref 135–145)
WBC # BLD AUTO: 5.5 K/UL (ref 4.8–10.8)

## 2018-12-22 PROCEDURE — 700105 HCHG RX REV CODE 258: Performed by: INTERNAL MEDICINE

## 2018-12-22 PROCEDURE — 700111 HCHG RX REV CODE 636 W/ 250 OVERRIDE (IP): Performed by: INTERNAL MEDICINE

## 2018-12-22 PROCEDURE — 770006 HCHG ROOM/CARE - MED/SURG/GYN SEMI*

## 2018-12-22 PROCEDURE — 99233 SBSQ HOSP IP/OBS HIGH 50: CPT | Mod: GC | Performed by: INTERNAL MEDICINE

## 2018-12-22 PROCEDURE — 80053 COMPREHEN METABOLIC PANEL: CPT

## 2018-12-22 PROCEDURE — 99232 SBSQ HOSP IP/OBS MODERATE 35: CPT | Mod: GC | Performed by: INTERNAL MEDICINE

## 2018-12-22 PROCEDURE — 700105 HCHG RX REV CODE 258: Performed by: STUDENT IN AN ORGANIZED HEALTH CARE EDUCATION/TRAINING PROGRAM

## 2018-12-22 PROCEDURE — 700111 HCHG RX REV CODE 636 W/ 250 OVERRIDE (IP): Performed by: STUDENT IN AN ORGANIZED HEALTH CARE EDUCATION/TRAINING PROGRAM

## 2018-12-22 PROCEDURE — 700111 HCHG RX REV CODE 636 W/ 250 OVERRIDE (IP)

## 2018-12-22 PROCEDURE — 82962 GLUCOSE BLOOD TEST: CPT | Mod: 91

## 2018-12-22 PROCEDURE — 74018 RADEX ABDOMEN 1 VIEW: CPT

## 2018-12-22 PROCEDURE — C9113 INJ PANTOPRAZOLE SODIUM, VIA: HCPCS | Performed by: STUDENT IN AN ORGANIZED HEALTH CARE EDUCATION/TRAINING PROGRAM

## 2018-12-22 PROCEDURE — 700111 HCHG RX REV CODE 636 W/ 250 OVERRIDE (IP): Performed by: GENERAL PRACTICE

## 2018-12-22 PROCEDURE — 700102 HCHG RX REV CODE 250 W/ 637 OVERRIDE(OP): Performed by: INTERNAL MEDICINE

## 2018-12-22 PROCEDURE — 85025 COMPLETE CBC W/AUTO DIFF WBC: CPT

## 2018-12-22 RX ORDER — ONDANSETRON 2 MG/ML
INJECTION INTRAMUSCULAR; INTRAVENOUS
Status: COMPLETED
Start: 2018-12-22 | End: 2018-12-22

## 2018-12-22 RX ORDER — HYDROMORPHONE HYDROCHLORIDE 2 MG/ML
INJECTION, SOLUTION INTRAMUSCULAR; INTRAVENOUS; SUBCUTANEOUS
Status: COMPLETED
Start: 2018-12-22 | End: 2018-12-22

## 2018-12-22 RX ORDER — PANTOPRAZOLE SODIUM 40 MG/10ML
40 INJECTION, POWDER, LYOPHILIZED, FOR SOLUTION INTRAVENOUS DAILY
Status: DISCONTINUED | OUTPATIENT
Start: 2018-12-22 | End: 2018-12-26

## 2018-12-22 RX ORDER — POTASSIUM CHLORIDE 7.45 MG/ML
10 INJECTION INTRAVENOUS
Status: COMPLETED | OUTPATIENT
Start: 2018-12-22 | End: 2018-12-22

## 2018-12-22 RX ADMIN — INSULIN HUMAN 4 UNITS: 100 INJECTION, SOLUTION PARENTERAL at 07:35

## 2018-12-22 RX ADMIN — POTASSIUM CHLORIDE 10 MEQ: 7.46 INJECTION, SOLUTION INTRAVENOUS at 05:18

## 2018-12-22 RX ADMIN — PROCHLORPERAZINE EDISYLATE 10 MG: 5 INJECTION INTRAMUSCULAR; INTRAVENOUS at 17:40

## 2018-12-22 RX ADMIN — METOCLOPRAMIDE 10 MG: 5 INJECTION, SOLUTION INTRAMUSCULAR; INTRAVENOUS at 23:31

## 2018-12-22 RX ADMIN — HYDROMORPHONE HYDROCHLORIDE 0.5 MG: 2 INJECTION INTRAMUSCULAR; INTRAVENOUS; SUBCUTANEOUS at 21:51

## 2018-12-22 RX ADMIN — PROCHLORPERAZINE EDISYLATE 10 MG: 5 INJECTION INTRAMUSCULAR; INTRAVENOUS at 05:11

## 2018-12-22 RX ADMIN — METOCLOPRAMIDE 10 MG: 5 INJECTION, SOLUTION INTRAMUSCULAR; INTRAVENOUS at 06:05

## 2018-12-22 RX ADMIN — INSULIN HUMAN 2 UNITS: 100 INJECTION, SOLUTION PARENTERAL at 11:25

## 2018-12-22 RX ADMIN — SODIUM CHLORIDE, POTASSIUM CHLORIDE, SODIUM LACTATE AND CALCIUM CHLORIDE: 600; 310; 30; 20 INJECTION, SOLUTION INTRAVENOUS at 17:33

## 2018-12-22 RX ADMIN — METOCLOPRAMIDE 10 MG: 5 INJECTION, SOLUTION INTRAMUSCULAR; INTRAVENOUS at 11:01

## 2018-12-22 RX ADMIN — PYRIDOXINE HYDROCHLORIDE 50 MG: 100 INJECTION, SOLUTION INTRAMUSCULAR; INTRAVENOUS at 09:25

## 2018-12-22 RX ADMIN — INSULIN HUMAN 1 UNITS: 100 INJECTION, SOLUTION PARENTERAL at 17:36

## 2018-12-22 RX ADMIN — HYDROMORPHONE HYDROCHLORIDE 0.5 MG: 2 INJECTION INTRAMUSCULAR; INTRAVENOUS; SUBCUTANEOUS at 17:40

## 2018-12-22 RX ADMIN — AZITHROMYCIN FOR INJECTION INJECTION, POWDER, LYOPHILIZED, FOR SOLUTION 500 MG: 500 INJECTION INTRAVENOUS at 19:50

## 2018-12-22 RX ADMIN — ONDANSETRON 4 MG: 2 INJECTION INTRAMUSCULAR; INTRAVENOUS at 19:50

## 2018-12-22 RX ADMIN — POTASSIUM CHLORIDE 10 MEQ: 7.46 INJECTION, SOLUTION INTRAVENOUS at 07:37

## 2018-12-22 RX ADMIN — INSULIN GLARGINE 25 UNITS: 100 INJECTION, SOLUTION SUBCUTANEOUS at 07:35

## 2018-12-22 RX ADMIN — HYDROMORPHONE HYDROCHLORIDE 0.5 MG: 2 INJECTION INTRAMUSCULAR; INTRAVENOUS; SUBCUTANEOUS at 05:19

## 2018-12-22 RX ADMIN — ENOXAPARIN SODIUM 40 MG: 100 INJECTION SUBCUTANEOUS at 06:04

## 2018-12-22 RX ADMIN — HYDROMORPHONE HYDROCHLORIDE 0.5 MG: 2 INJECTION INTRAMUSCULAR; INTRAVENOUS; SUBCUTANEOUS at 01:55

## 2018-12-22 RX ADMIN — HYDROMORPHONE HYDROCHLORIDE 0.5 MG: 2 INJECTION INTRAMUSCULAR; INTRAVENOUS; SUBCUTANEOUS at 14:59

## 2018-12-22 RX ADMIN — ONDANSETRON 4 MG: 2 INJECTION INTRAMUSCULAR; INTRAVENOUS at 01:49

## 2018-12-22 RX ADMIN — ONDANSETRON 4 MG: 2 INJECTION INTRAMUSCULAR; INTRAVENOUS at 14:59

## 2018-12-22 RX ADMIN — ONDANSETRON 4 MG: 2 INJECTION INTRAMUSCULAR; INTRAVENOUS at 08:49

## 2018-12-22 RX ADMIN — METOCLOPRAMIDE 10 MG: 5 INJECTION, SOLUTION INTRAMUSCULAR; INTRAVENOUS at 00:42

## 2018-12-22 RX ADMIN — PANTOPRAZOLE SODIUM 40 MG: 40 INJECTION, POWDER, FOR SOLUTION INTRAVENOUS at 19:50

## 2018-12-22 RX ADMIN — INSULIN HUMAN 2 UNITS: 100 INJECTION, SOLUTION PARENTERAL at 21:57

## 2018-12-22 RX ADMIN — HYDROMORPHONE HYDROCHLORIDE 0.5 MG: 2 INJECTION INTRAMUSCULAR; INTRAVENOUS; SUBCUTANEOUS at 11:02

## 2018-12-22 RX ADMIN — FAMOTIDINE 20 MG: 10 INJECTION INTRAVENOUS at 06:13

## 2018-12-22 RX ADMIN — POTASSIUM CHLORIDE 10 MEQ: 7.46 INJECTION, SOLUTION INTRAVENOUS at 04:01

## 2018-12-22 RX ADMIN — POTASSIUM CHLORIDE 10 MEQ: 7.46 INJECTION, SOLUTION INTRAVENOUS at 06:28

## 2018-12-22 RX ADMIN — SODIUM CHLORIDE, POTASSIUM CHLORIDE, SODIUM LACTATE AND CALCIUM CHLORIDE: 600; 310; 30; 20 INJECTION, SOLUTION INTRAVENOUS at 00:56

## 2018-12-22 RX ADMIN — METOCLOPRAMIDE 10 MG: 5 INJECTION, SOLUTION INTRAMUSCULAR; INTRAVENOUS at 17:40

## 2018-12-22 ASSESSMENT — PAIN SCALES - GENERAL
PAINLEVEL_OUTOF10: 8
PAINLEVEL_OUTOF10: 8
PAINLEVEL_OUTOF10: 3
PAINLEVEL_OUTOF10: 6
PAINLEVEL_OUTOF10: 6

## 2018-12-22 ASSESSMENT — ENCOUNTER SYMPTOMS
DEPRESSION: 0
PALPITATIONS: 0
DOUBLE VISION: 0
VOMITING: 1
COUGH: 0
BLURRED VISION: 0
DIAPHORESIS: 0
MYALGIAS: 0
NERVOUS/ANXIOUS: 0
BACK PAIN: 0
SINUS PAIN: 0
ABDOMINAL PAIN: 1
FEVER: 0
TINGLING: 0
SORE THROAT: 0
CHILLS: 0
SPUTUM PRODUCTION: 0
SENSORY CHANGE: 0
NAUSEA: 1
HEADACHES: 0

## 2018-12-22 NOTE — PROGRESS NOTES
"Pulmonary Progress Note    Patient ID:   Name:             Alis Sparks   YOB: 1990  Age:                 28 y.o.  female   MRN:               [id number]                                                  Subjective:  Chest pain on and off.    Interval Changes: 92% on 2L NC.    ROS:  Denies pleuritic pain, vomiting, heartburn.    Objective:   Vitals/ General Appearance:   Weight/BMI: Body mass index is 31.73 kg/m².  Blood pressure 144/89, pulse 87, temperature 36.7 °C (98 °F), temperature source Temporal, resp. rate 18, height 1.651 m (5' 5\"), weight 86.5 kg (190 lb 11.2 oz), last menstrual period 11/18/2018, SpO2 94 %, not currently breastfeeding.  Vitals:    12/22/18 0355 12/22/18 0715 12/22/18 0800 12/22/18 1135   BP: 152/97 160/111  144/89   Pulse: 79 100  87   Resp: 18 16  18   Temp: 36 °C (96.8 °F) 36.9 °C (98.5 °F)  36.7 °C (98 °F)   TempSrc: Temporal Temporal  Temporal   SpO2: 97% 95%  94%   Weight:   86.5 kg (190 lb 11.2 oz)    Height:         Oxygen Therapy:  Pulse Oximetry: 94 %, O2 (LPM): 1, O2 Delivery: None (Room Air)    Constitutional:   Well developed, Well nourished, No acute distress  HENMT:  Normocephalic, Atraumatic, Oropharynx moist mucous membranes, No oral exudates, Nose normal.  No thyromegaly.  Eyes:  EOMI, Conjunctiva normal, No discharge.  Neck:  Normal range of motion, No cervical tenderness,  no JVD.  Cardiovascular:  Regular rate and rhythm.  Lungs:  Clear at auscultation bilaterally.  Abdomen: Bowel sounds normal, Soft, No tenderness, No guarding, No rebound, No masses, No hepatosplenomegaly.  Skin: Warm, Dry, No erythema, No rash, no induration.  Neurologic: Alert & oriented x 3, No focal deficits noted, cranial nerves II through X are grossly intact.  Extremities: Moving all 4.    Labs:  Recent Labs      12/20/18   0522  12/21/18   0231  12/22/18   0055   WBC  5.5  4.9  5.5   RBC  4.17*  4.16*  4.18*   HEMOGLOBIN  12.2  12.1  12.3   HEMATOCRIT  36.1*  36.2*  " 35.5*   MCV  86.6  87.0  84.9   MCH  29.3  29.1  29.4   MCHC  33.8  33.4*  34.6   RDW  44.8  42.8  39.9   PLATELETCT  134*  112*  127*   MPV  10.5  10.7  10.5                 Recent Labs      12/20/18   0522  12/21/18   0231  12/22/18   0055   SODIUM  145  141  141   POTASSIUM  3.7  3.4*  3.3*   CHLORIDE  117*  108  103   CO2  23  25  28   GLUCOSE  161*  209*  184*   BUN  10  7*  6*     Recent Labs      12/20/18   0522  12/21/18   0231  12/22/18   0055   SODIUM  145  141  141   POTASSIUM  3.7  3.4*  3.3*   CHLORIDE  117*  108  103   CO2  23  25  28   BUN  10  7*  6*   CREATININE  0.52  0.41*  0.44*   MAGNESIUM  2.0   --    --    PHOSPHORUS  2.8   --    --    CALCIUM  8.1*  7.9*  8.3*     Imaging:  [id number] VIEW   Final Result         No specific finding to suggest small bowel obstruction.      CT-RENAL COLIC EVALUATION(A/P W/O)   Final Result      Minimal prominence of the renal collecting systems is likely related to bladder distention.      Trace free fluid in the abdomen and pelvis.      There may be mild wall thickening of the ascending and transverse colon. This may be related to hypoproteinemia or may be infectious/inflammatory.      Moderate amount of colonic stool.      Small bilateral pleural effusions. Patchy bilateral lower lobe airspace opacities may be infectious/inflammatory.      Subcutaneous stranding in the anterior abdominal wall may be posttraumatic.      Nonvisualization of the appendix, limiting evaluation.         US-RENAL   Final Result      1.  Moderate RIGHT hydronephrosis.   2.  Unremarkable LEFT kidney.      DX-CHEST-PORTABLE (1 VIEW)   Final Result      Right central line projects over the superior cavoatrial junction.      No acute cardiopulmonary process is identified.         IR-MIDLINE CATHETER INSERTION >5 YRS    (Results Pending)       Hospital Medications:    Current Facility-Administered Medications:   •  pantoprazole (PROTONIX) injection 40 mg, 40 mg, Intravenous, DAILY, Shiraz  Monet Adame M.D.  •  potassium chloride SA (Kdur) tablet 40 mEq, 40 mEq, Oral, DAILY, Shiraz Adame M.D., Stopped at 12/22/18 0600  •  insulin glargine (LANTUS) injection 25 Units, 25 Units, Subcutaneous, QA INSULIN, Aydee Marcano M.D., 25 Units at 12/22/18 0735  •  guaiFENesin LA (MUCINEX) tablet 600 mg, 600 mg, Oral, Q12HRS, Shiraz Adame M.D., Stopped at 12/22/18 0600  •  azithromycin (ZITHROMAX) 500 mg in D5W 250 mL IVPB, 500 mg, Intravenous, Q24HRS, Shiraz Adame M.D., Stopped at 12/21/18 1741  •  HYDROmorphone (DILAUDID) injection 0.5 mg, 0.5 mg, Intravenous, Q2HRS PRN, Cholo Alan Jr., D.OHollis, 0.5 mg at 12/22/18 1102  •  prochlorperazine (COMPAZINE) injection 10 mg, 10 mg, Intravenous, Q6HRS PRN, Jeremy M Gonda, M.D., 10 mg at 12/22/18 0511  •  metoclopramide (REGLAN) injection 10 mg, 10 mg, Intravenous, Q6HRS, Jeremy M Gonda, M.D., 10 mg at 12/22/18 1101  •  enoxaparin (LOVENOX) inj 40 mg, 40 mg, Subcutaneous, DAILY, Cholo Alan Jr., D.O., 40 mg at 12/22/18 0604  •  lactated ringers infusion, , Intravenous, Continuous, Cholo Alan Jr., D.O., Last Rate: 125 mL/hr at 12/22/18 0056  •  Influenza Vaccine Quad pf injection 0.5 mL, 0.5 mL, Intramuscular, Once PRN, Cholo Alan Jr., D.O.  •  insulin regular (HUMULIN R) injection 1-6 Units, 1-6 Units, Subcutaneous, 4X/DAY ACHS, 2 Units at 12/22/18 1125 **AND** NOTIFY MD and PharmD, , , Once **AND** glucose 4 g chewable tablet 16 g, 16 g, Oral, Q15 MIN PRN **AND** dextrose 50% (D50W) injection 25 mL, 25 mL, Intravenous, Q15 MIN PRN, Cholo Alan Jr., D.O.  •  atorvastatin (LIPITOR) tablet 20 mg, 20 mg, Oral, DAILY, Adrian Sanchez M.D., Stopped at 12/22/18 0600  •  acetaminophen (TYLENOL) tablet 650 mg, 650 mg, Oral, Q6HRS PRN, Cholo Alan Jr., D.O., 650 mg at 12/20/18 0012  •  senna-docusate (PERICOLACE or SENOKOT S) 8.6-50 MG per tablet 2 Tab, 2 Tab, Oral, BID, Stopped at 12/19/18 0600 **AND**  polyethylene glycol/lytes (MIRALAX) PACKET 1 Packet, 1 Packet, Oral, QDAY PRN **AND** magnesium hydroxide (MILK OF MAGNESIA) suspension 30 mL, 30 mL, Oral, QDAY PRN **AND** bisacodyl (DULCOLAX) suppository 10 mg, 10 mg, Rectal, QDAY PRN, Adrian Sanchez M.D.  •  hydrALAZINE (APRESOLINE) injection 10 mg, 10 mg, Intravenous, Q4HRS PRN, Adrian Sanchez M.D.  •  ondansetron (ZOFRAN) syringe/vial injection 4 mg, 4 mg, Intravenous, Q4HRS PRN, Adrian Sanchez M.D., 4 mg at 12/22/18 0849  •  ondansetron (ZOFRAN ODT) dispertab 4 mg, 4 mg, Oral, Q4HRS PRN, Adrian Sanchez M.D.    Medication Allergy:  Allergies   Allergen Reactions   • Pcn [Penicillins] Shortness of Breath and Swelling     Per patient's Mom, patient tolerates Keflex   • Lisinopril Unspecified     Per historical: Reported pedal swelling. No facial/angioedema or rash nor respiratory symptoms.    • Promethazine Vomiting       Assessment and Plan:  Active Problems:    Intractable nausea and vomiting POA: Yes    Diabetic ketoacidosis (HCC) POA: Yes    Pneumonia POA: Unknown    Diabetes type I (CMS-HCC) POA: Yes    Gastroparesis POA: Yes    Dehydration POA: Yes    Difficult intravenous access POA: Yes    Flank pain POA: Yes    Leukocytosis POA: Yes  Resolved Problems:    * No resolved hospital problems. *    28 year old female with a history of type 1 diabetes mellitus presented in diabetic ketoacidosis transferred from the ICU to the medical floor 12/20. Patient was started on azithromycin 12/21 for cough with left flank (non-CVA) pain. There is a question of aspiration pneumonia due to presenting with vomiting. Patient has clinically improved and has been weaned to room air this afternoon.    Recommendations:  - No thoracentesis needed due to small effusion size  - Continue current treatment including incentive spirometry and antibiotics  - No need to change antibiotic agent to cover anaerobes as patient is improving on current agent  - Pulmonology  will sign off, please contact us with any concerns

## 2018-12-22 NOTE — CARE PLAN
Problem: Communication  Goal: The ability to communicate needs accurately and effectively will improve  Outcome: PROGRESSING AS EXPECTED  Patient appropriately communicates needs.     Problem: Safety  Goal: Will remain free from injury  Outcome: PROGRESSING AS EXPECTED  Up with standby assist. Calls appropriately for assistance.     Problem: Pain Management  Goal: Pain level will decrease to patient's comfort goal  Outcome: PROGRESSING AS EXPECTED  Dilaudid in use PRN for pain.

## 2018-12-22 NOTE — PROGRESS NOTES
Assumed care of patient at 0700.  Report received at bedside.  Patient is A&O x 4, suffering from nausea/vomitting.  Hourly rounding in place.

## 2018-12-22 NOTE — ASSESSMENT & PLAN NOTE
- Continue Lantus 30 units twice daily  - Diabetes education prior to discharge  -continue short acting insulin  -check blood glucose three times a day   -PCP follow up  -follow up endocrinology

## 2018-12-22 NOTE — PROGRESS NOTES
Patient with nausea and vomiting. Rotating reglan, compazine and zofran for nausea. Patient continues to have vomiting while awake. Unable to tolerate oral intake. Lactated ringers running at 125 ml/hr. Up with standby assist and steady gait. Midline with blood return.  in use. Patient requiring 1 liter by nasal cannula. Emesis bag at bedside.

## 2018-12-22 NOTE — PROGRESS NOTES
Dr. Carrera notified of continued nausea. Notified MD that patient stated benadryl works well for nausea. Per MD he will place order for benadryl PRN.

## 2018-12-23 LAB
ALBUMIN SERPL BCP-MCNC: 3.6 G/DL (ref 3.2–4.9)
ALBUMIN/GLOB SERPL: 1.4 G/DL
ALP SERPL-CCNC: 101 U/L (ref 30–99)
ALT SERPL-CCNC: 13 U/L (ref 2–50)
ANION GAP SERPL CALC-SCNC: 10 MMOL/L (ref 0–11.9)
ANION GAP SERPL CALC-SCNC: 14 MMOL/L (ref 0–11.9)
ANION GAP SERPL CALC-SCNC: 23 MMOL/L (ref 0–11.9)
ANION GAP SERPL CALC-SCNC: 23 MMOL/L (ref 0–11.9)
AST SERPL-CCNC: 8 U/L (ref 12–45)
B-OH-BUTYR SERPL-MCNC: >8 MMOL/L (ref 0.02–0.27)
BASE EXCESS BLDA CALC-SCNC: -22 MMOL/L (ref -4–3)
BASOPHILS # BLD AUTO: 0.4 % (ref 0–1.8)
BASOPHILS # BLD: 0.03 K/UL (ref 0–0.12)
BILIRUB SERPL-MCNC: 0.8 MG/DL (ref 0.1–1.5)
BODY TEMPERATURE: ABNORMAL CENTIGRADE
BUN SERPL-MCNC: 5 MG/DL (ref 8–22)
BUN SERPL-MCNC: 5 MG/DL (ref 8–22)
BUN SERPL-MCNC: 6 MG/DL (ref 8–22)
BUN SERPL-MCNC: 7 MG/DL (ref 8–22)
CALCIUM SERPL-MCNC: 8.3 MG/DL (ref 8.5–10.5)
CALCIUM SERPL-MCNC: 8.3 MG/DL (ref 8.5–10.5)
CALCIUM SERPL-MCNC: 8.7 MG/DL (ref 8.5–10.5)
CALCIUM SERPL-MCNC: 9 MG/DL (ref 8.5–10.5)
CHLORIDE SERPL-SCNC: 100 MMOL/L (ref 96–112)
CHLORIDE SERPL-SCNC: 108 MMOL/L (ref 96–112)
CHLORIDE SERPL-SCNC: 110 MMOL/L (ref 96–112)
CHLORIDE SERPL-SCNC: 112 MMOL/L (ref 96–112)
CO2 SERPL-SCNC: 10 MMOL/L (ref 20–33)
CO2 SERPL-SCNC: 13 MMOL/L (ref 20–33)
CO2 SERPL-SCNC: 6 MMOL/L (ref 20–33)
CO2 SERPL-SCNC: 8 MMOL/L (ref 20–33)
CREAT SERPL-MCNC: 0.55 MG/DL (ref 0.5–1.4)
CREAT SERPL-MCNC: 0.55 MG/DL (ref 0.5–1.4)
CREAT SERPL-MCNC: 0.57 MG/DL (ref 0.5–1.4)
CREAT SERPL-MCNC: 0.6 MG/DL (ref 0.5–1.4)
EOSINOPHIL # BLD AUTO: 0.01 K/UL (ref 0–0.51)
EOSINOPHIL NFR BLD: 0.1 % (ref 0–6.9)
ERYTHROCYTE [DISTWIDTH] IN BLOOD BY AUTOMATED COUNT: 41.2 FL (ref 35.9–50)
GLOBULIN SER CALC-MCNC: 2.5 G/DL (ref 1.9–3.5)
GLUCOSE BLD-MCNC: 122 MG/DL (ref 65–99)
GLUCOSE BLD-MCNC: 154 MG/DL (ref 65–99)
GLUCOSE BLD-MCNC: 156 MG/DL (ref 65–99)
GLUCOSE BLD-MCNC: 158 MG/DL (ref 65–99)
GLUCOSE BLD-MCNC: 161 MG/DL (ref 65–99)
GLUCOSE BLD-MCNC: 184 MG/DL (ref 65–99)
GLUCOSE BLD-MCNC: 195 MG/DL (ref 65–99)
GLUCOSE BLD-MCNC: 197 MG/DL (ref 65–99)
GLUCOSE BLD-MCNC: 224 MG/DL (ref 65–99)
GLUCOSE BLD-MCNC: 253 MG/DL (ref 65–99)
GLUCOSE BLD-MCNC: 353 MG/DL (ref 65–99)
GLUCOSE SERPL-MCNC: 163 MG/DL (ref 65–99)
GLUCOSE SERPL-MCNC: 210 MG/DL (ref 65–99)
GLUCOSE SERPL-MCNC: 258 MG/DL (ref 65–99)
GLUCOSE SERPL-MCNC: 366 MG/DL (ref 65–99)
HCO3 BLDA-SCNC: 5 MMOL/L (ref 17–25)
HCT VFR BLD AUTO: 42.5 % (ref 37–47)
HGB BLD-MCNC: 13.8 G/DL (ref 12–16)
IMM GRANULOCYTES # BLD AUTO: 0.04 K/UL (ref 0–0.11)
IMM GRANULOCYTES NFR BLD AUTO: 0.5 % (ref 0–0.9)
LACTATE BLD-SCNC: 0.7 MMOL/L (ref 0.5–2)
LYMPHOCYTES # BLD AUTO: 1.39 K/UL (ref 1–4.8)
LYMPHOCYTES NFR BLD: 17.7 % (ref 22–41)
MAGNESIUM SERPL-MCNC: 1.6 MG/DL (ref 1.5–2.5)
MAGNESIUM SERPL-MCNC: 1.9 MG/DL (ref 1.5–2.5)
MCH RBC QN AUTO: 28.7 PG (ref 27–33)
MCHC RBC AUTO-ENTMCNC: 32.5 G/DL (ref 33.6–35)
MCV RBC AUTO: 88.4 FL (ref 81.4–97.8)
MONOCYTES # BLD AUTO: 0.69 K/UL (ref 0–0.85)
MONOCYTES NFR BLD AUTO: 8.8 % (ref 0–13.4)
NEUTROPHILS # BLD AUTO: 5.69 K/UL (ref 2–7.15)
NEUTROPHILS NFR BLD: 72.5 % (ref 44–72)
NRBC # BLD AUTO: 0 K/UL
NRBC BLD-RTO: 0 /100 WBC
PCO2 BLDA: 13 MMHG (ref 26–37)
PH BLDA: 7.16 [PH] (ref 7.4–7.5)
PHOSPHATE SERPL-MCNC: 1.3 MG/DL (ref 2.5–4.5)
PHOSPHATE SERPL-MCNC: 3.9 MG/DL (ref 2.5–4.5)
PLATELET # BLD AUTO: 192 K/UL (ref 164–446)
PMV BLD AUTO: 10.2 FL (ref 9–12.9)
PO2 BLDA: 101.4 MMHG (ref 64–87)
POTASSIUM SERPL-SCNC: 3.6 MMOL/L (ref 3.6–5.5)
POTASSIUM SERPL-SCNC: 3.8 MMOL/L (ref 3.6–5.5)
POTASSIUM SERPL-SCNC: 4 MMOL/L (ref 3.6–5.5)
POTASSIUM SERPL-SCNC: 4.2 MMOL/L (ref 3.6–5.5)
PROCALCITONIN SERPL-MCNC: 0.05 NG/ML
PROT SERPL-MCNC: 6.1 G/DL (ref 6–8.2)
RBC # BLD AUTO: 4.81 M/UL (ref 4.2–5.4)
SAO2 % BLDA: 96.4 % (ref 93–99)
SODIUM SERPL-SCNC: 133 MMOL/L (ref 135–145)
SODIUM SERPL-SCNC: 133 MMOL/L (ref 135–145)
SODIUM SERPL-SCNC: 134 MMOL/L (ref 135–145)
SODIUM SERPL-SCNC: 137 MMOL/L (ref 135–145)
WBC # BLD AUTO: 7.9 K/UL (ref 4.8–10.8)

## 2018-12-23 PROCEDURE — 700102 HCHG RX REV CODE 250 W/ 637 OVERRIDE(OP): Performed by: INTERNAL MEDICINE

## 2018-12-23 PROCEDURE — C9113 INJ PANTOPRAZOLE SODIUM, VIA: HCPCS | Performed by: STUDENT IN AN ORGANIZED HEALTH CARE EDUCATION/TRAINING PROGRAM

## 2018-12-23 PROCEDURE — 770022 HCHG ROOM/CARE - ICU (200)

## 2018-12-23 PROCEDURE — 82010 KETONE BODYS QUAN: CPT

## 2018-12-23 PROCEDURE — 80053 COMPREHEN METABOLIC PANEL: CPT

## 2018-12-23 PROCEDURE — 82962 GLUCOSE BLOOD TEST: CPT

## 2018-12-23 PROCEDURE — 82803 BLOOD GASES ANY COMBINATION: CPT

## 2018-12-23 PROCEDURE — 700111 HCHG RX REV CODE 636 W/ 250 OVERRIDE (IP): Performed by: INTERNAL MEDICINE

## 2018-12-23 PROCEDURE — 99291 CRITICAL CARE FIRST HOUR: CPT | Performed by: INTERNAL MEDICINE

## 2018-12-23 PROCEDURE — 84145 PROCALCITONIN (PCT): CPT

## 2018-12-23 PROCEDURE — 83735 ASSAY OF MAGNESIUM: CPT

## 2018-12-23 PROCEDURE — 85025 COMPLETE CBC W/AUTO DIFF WBC: CPT

## 2018-12-23 PROCEDURE — 99233 SBSQ HOSP IP/OBS HIGH 50: CPT | Mod: GC | Performed by: INTERNAL MEDICINE

## 2018-12-23 PROCEDURE — 700105 HCHG RX REV CODE 258: Performed by: INTERNAL MEDICINE

## 2018-12-23 PROCEDURE — 84100 ASSAY OF PHOSPHORUS: CPT

## 2018-12-23 PROCEDURE — 83605 ASSAY OF LACTIC ACID: CPT

## 2018-12-23 PROCEDURE — 80048 BASIC METABOLIC PNL TOTAL CA: CPT

## 2018-12-23 PROCEDURE — 700105 HCHG RX REV CODE 258: Performed by: STUDENT IN AN ORGANIZED HEALTH CARE EDUCATION/TRAINING PROGRAM

## 2018-12-23 PROCEDURE — 700111 HCHG RX REV CODE 636 W/ 250 OVERRIDE (IP): Performed by: STUDENT IN AN ORGANIZED HEALTH CARE EDUCATION/TRAINING PROGRAM

## 2018-12-23 RX ORDER — MAGNESIUM SULFATE HEPTAHYDRATE 40 MG/ML
2 INJECTION, SOLUTION INTRAVENOUS ONCE
Status: COMPLETED | OUTPATIENT
Start: 2018-12-24 | End: 2018-12-24

## 2018-12-23 RX ORDER — SODIUM CHLORIDE 9 MG/ML
1000 INJECTION, SOLUTION INTRAVENOUS ONCE
Status: COMPLETED | OUTPATIENT
Start: 2018-12-23 | End: 2018-12-23

## 2018-12-23 RX ORDER — LORAZEPAM 2 MG/ML
0.5 INJECTION INTRAMUSCULAR ONCE
Status: ACTIVE | OUTPATIENT
Start: 2018-12-23 | End: 2018-12-24

## 2018-12-23 RX ORDER — INSULIN GLARGINE 100 [IU]/ML
30 INJECTION, SOLUTION SUBCUTANEOUS
Status: DISCONTINUED | OUTPATIENT
Start: 2018-12-24 | End: 2018-12-23

## 2018-12-23 RX ORDER — DEXTROSE AND SODIUM CHLORIDE 5; .9 G/100ML; G/100ML
INJECTION, SOLUTION INTRAVENOUS CONTINUOUS
Status: DISCONTINUED | OUTPATIENT
Start: 2018-12-23 | End: 2018-12-24

## 2018-12-23 RX ORDER — SODIUM CHLORIDE 9 MG/ML
INJECTION, SOLUTION INTRAVENOUS CONTINUOUS
Status: DISCONTINUED | OUTPATIENT
Start: 2018-12-23 | End: 2018-12-23

## 2018-12-23 RX ORDER — DEXTROSE AND SODIUM CHLORIDE 10; .45 G/100ML; G/100ML
INJECTION, SOLUTION INTRAVENOUS CONTINUOUS
Status: DISCONTINUED | OUTPATIENT
Start: 2018-12-23 | End: 2018-12-24

## 2018-12-23 RX ORDER — MAGNESIUM SULFATE HEPTAHYDRATE 40 MG/ML
2 INJECTION, SOLUTION INTRAVENOUS
Status: COMPLETED | OUTPATIENT
Start: 2018-12-23 | End: 2018-12-23

## 2018-12-23 RX ORDER — MAGNESIUM SULFATE HEPTAHYDRATE 40 MG/ML
4 INJECTION, SOLUTION INTRAVENOUS
Status: COMPLETED | OUTPATIENT
Start: 2018-12-23 | End: 2018-12-23

## 2018-12-23 RX ORDER — POTASSIUM CHLORIDE 7.45 MG/ML
10 INJECTION INTRAVENOUS
Status: COMPLETED | OUTPATIENT
Start: 2018-12-23 | End: 2018-12-23

## 2018-12-23 RX ORDER — SODIUM CHLORIDE 9 MG/ML
INJECTION, SOLUTION INTRAVENOUS CONTINUOUS
Status: DISCONTINUED | OUTPATIENT
Start: 2018-12-23 | End: 2018-12-24

## 2018-12-23 RX ORDER — MORPHINE SULFATE 10 MG/ML
2 INJECTION, SOLUTION INTRAMUSCULAR; INTRAVENOUS EVERY 4 HOURS PRN
Status: DISCONTINUED | OUTPATIENT
Start: 2018-12-23 | End: 2018-12-25

## 2018-12-23 RX ORDER — DIPHENHYDRAMINE HYDROCHLORIDE 50 MG/ML
25 INJECTION INTRAMUSCULAR; INTRAVENOUS EVERY 6 HOURS
Status: DISCONTINUED | OUTPATIENT
Start: 2018-12-23 | End: 2018-12-24

## 2018-12-23 RX ADMIN — AZITHROMYCIN FOR INJECTION INJECTION, POWDER, LYOPHILIZED, FOR SOLUTION 500 MG: 500 INJECTION INTRAVENOUS at 20:36

## 2018-12-23 RX ADMIN — INSULIN HUMAN 5 UNITS: 100 INJECTION, SOLUTION PARENTERAL at 05:44

## 2018-12-23 RX ADMIN — SODIUM CHLORIDE 1000 ML: 9 INJECTION, SOLUTION INTRAVENOUS at 12:52

## 2018-12-23 RX ADMIN — METOCLOPRAMIDE 10 MG: 5 INJECTION, SOLUTION INTRAMUSCULAR; INTRAVENOUS at 19:17

## 2018-12-23 RX ADMIN — HYDROMORPHONE HYDROCHLORIDE 0.5 MG: 2 INJECTION INTRAMUSCULAR; INTRAVENOUS; SUBCUTANEOUS at 07:39

## 2018-12-23 RX ADMIN — MORPHINE SULFATE 2 MG: 10 INJECTION INTRAVENOUS at 15:56

## 2018-12-23 RX ADMIN — PROCHLORPERAZINE EDISYLATE 10 MG: 5 INJECTION INTRAMUSCULAR; INTRAVENOUS at 09:40

## 2018-12-23 RX ADMIN — METOCLOPRAMIDE 10 MG: 5 INJECTION, SOLUTION INTRAMUSCULAR; INTRAVENOUS at 05:40

## 2018-12-23 RX ADMIN — POTASSIUM CHLORIDE 10 MEQ: 7.46 INJECTION, SOLUTION INTRAVENOUS at 21:04

## 2018-12-23 RX ADMIN — SODIUM CHLORIDE: 9 INJECTION, SOLUTION INTRAVENOUS at 14:09

## 2018-12-23 RX ADMIN — DIPHENHYDRAMINE HYDROCHLORIDE 25 MG: 50 INJECTION, SOLUTION INTRAMUSCULAR; INTRAVENOUS at 20:36

## 2018-12-23 RX ADMIN — MORPHINE SULFATE 2 MG: 10 INJECTION INTRAVENOUS at 20:35

## 2018-12-23 RX ADMIN — POTASSIUM CHLORIDE 10 MEQ: 7.46 INJECTION, SOLUTION INTRAVENOUS at 22:03

## 2018-12-23 RX ADMIN — MORPHINE SULFATE 2 MG: 10 INJECTION INTRAVENOUS at 11:48

## 2018-12-23 RX ADMIN — ONDANSETRON 4 MG: 2 INJECTION INTRAMUSCULAR; INTRAVENOUS at 13:43

## 2018-12-23 RX ADMIN — PROCHLORPERAZINE EDISYLATE 10 MG: 5 INJECTION INTRAMUSCULAR; INTRAVENOUS at 01:18

## 2018-12-23 RX ADMIN — SODIUM CHLORIDE: 9 INJECTION, SOLUTION INTRAVENOUS at 07:43

## 2018-12-23 RX ADMIN — ENOXAPARIN SODIUM 40 MG: 100 INJECTION SUBCUTANEOUS at 05:40

## 2018-12-23 RX ADMIN — METOCLOPRAMIDE 10 MG: 5 INJECTION, SOLUTION INTRAMUSCULAR; INTRAVENOUS at 11:47

## 2018-12-23 RX ADMIN — PROCHLORPERAZINE EDISYLATE 10 MG: 5 INJECTION INTRAMUSCULAR; INTRAVENOUS at 23:22

## 2018-12-23 RX ADMIN — SODIUM CHLORIDE 1000 ML: 9 INJECTION, SOLUTION INTRAVENOUS at 14:04

## 2018-12-23 RX ADMIN — ONDANSETRON 4 MG: 2 INJECTION INTRAMUSCULAR; INTRAVENOUS at 07:37

## 2018-12-23 RX ADMIN — SODIUM CHLORIDE 4 UNITS/HR: 9 INJECTION, SOLUTION INTRAVENOUS at 15:18

## 2018-12-23 RX ADMIN — PANTOPRAZOLE SODIUM 40 MG: 40 INJECTION, POWDER, FOR SOLUTION INTRAVENOUS at 05:40

## 2018-12-23 RX ADMIN — PROCHLORPERAZINE EDISYLATE 10 MG: 5 INJECTION INTRAMUSCULAR; INTRAVENOUS at 15:56

## 2018-12-23 RX ADMIN — MAGNESIUM SULFATE IN WATER 2 G: 40 INJECTION, SOLUTION INTRAVENOUS at 15:26

## 2018-12-23 RX ADMIN — DEXTROSE AND SODIUM CHLORIDE: 10; .45 INJECTION, SOLUTION INTRAVENOUS at 17:17

## 2018-12-23 RX ADMIN — POTASSIUM CHLORIDE 10 MEQ: 7.46 INJECTION, SOLUTION INTRAVENOUS at 16:30

## 2018-12-23 RX ADMIN — POTASSIUM CHLORIDE 10 MEQ: 7.46 INJECTION, SOLUTION INTRAVENOUS at 15:03

## 2018-12-23 RX ADMIN — HYDROMORPHONE HYDROCHLORIDE 0.5 MG: 2 INJECTION INTRAMUSCULAR; INTRAVENOUS; SUBCUTANEOUS at 01:52

## 2018-12-23 RX ADMIN — INSULIN GLARGINE 25 UNITS: 100 INJECTION, SOLUTION SUBCUTANEOUS at 05:43

## 2018-12-23 RX ADMIN — DEXTROSE AND SODIUM CHLORIDE: 5; 900 INJECTION, SOLUTION INTRAVENOUS at 15:12

## 2018-12-23 ASSESSMENT — ENCOUNTER SYMPTOMS
TINGLING: 0
FEVER: 0
PALPITATIONS: 0
NAUSEA: 1
ABDOMINAL PAIN: 0
SPUTUM PRODUCTION: 0
SHORTNESS OF BREATH: 0
ABDOMINAL PAIN: 1
DIAPHORESIS: 0
BLURRED VISION: 0
POLYDIPSIA: 1
DOUBLE VISION: 0
MYALGIAS: 0
COUGH: 0
SORE THROAT: 0
SINUS PAIN: 0
STRIDOR: 0
SENSORY CHANGE: 0
DEPRESSION: 0
NERVOUS/ANXIOUS: 0
DIZZINESS: 0
VOMITING: 1
FOCAL WEAKNESS: 0
CHILLS: 0
BACK PAIN: 0
HEADACHES: 0

## 2018-12-23 ASSESSMENT — PAIN SCALES - GENERAL
PAINLEVEL_OUTOF10: 2
PAINLEVEL_OUTOF10: 8
PAINLEVEL_OUTOF10: 9
PAINLEVEL_OUTOF10: 7
PAINLEVEL_OUTOF10: 8
PAINLEVEL_OUTOF10: 9
PAINLEVEL_OUTOF10: 8
PAINLEVEL_OUTOF10: 0

## 2018-12-23 NOTE — PROGRESS NOTES
TRANSFER NOTE    Patient is a 29 y/o who got admitted to ICU on 12/18/18 for DKA secondary to uncontrolled diabetes. She was treated with insulin drip and IVF and transferred to the floor on 12/21/218. Her insulin was increased; however, her blood sugars are still not adequately controlled. Today labs showed elevated anion gap and low bicarb. She is tachycardic, tachypneic and has elevated BP. Will transfer this patient to ICU due to DKA. Contacted ICU resident who agreed to transfer this patient.

## 2018-12-23 NOTE — PROGRESS NOTES
2 RN skin check complete.  Devices in place - tele leads, BP cuff, SCDs  Skin assessed under devices - yes  Confirmed pressure ulcers found on - n/a  New potential pressure ulcers noted on - n/a  Wound consult and midas placed - n/a  The following interventions in place - Elbows are red and blanching, off weighted, heels are red and blanching, off weighted, ears are red and blanching, pt reports wearing oxygen during stay but is no longer wearing O2, small scabs on ears, do not appear to be pressure related.

## 2018-12-23 NOTE — PROGRESS NOTES
Internal Medicine Interval Note  Note Author: Shiraz Adame M.D.     Name Alis Sparks     1990   Age/Sex 28 y.o. female   MRN [id number]   Code Status FULL     After 5PM or if no immediate response to page, please call for cross-coverage  Attending/Team: Dr. Atkins/Jose Enrique See Patient List for primary contact information  Call [phone] to page    1st Call - Day Intern (R1):   Dr. Adame 2nd Call - Day Sr. Resident (R2/R3):   Dr. Marcano         Reason for interval visit  (Principal Problem)   Nausea, vomiting      Interval Problem Daily Status Update  (24 hours, problem oriented, brief subjective history, new lab/imaging data pertinent to that problem)   No acute events overnight. Patient is still c/o nausea and vomiting, also abdominal pain. She has not been eating or drinking anything due to nausea.  Will change famotidine to IV PPIs and get an abdominal xray to assess for constipation. Erythromycin considered instead of azithromycin due to prokinetic action; however, there is a shortage on erythromycin and is not available now; so she will be continued on azithromycin for now.  Pulmonary saw patient today for bilateral pneumonia and small pleural effusion and recommended she be continued on same meds since she is getting better.      Review of Systems   Constitutional: Negative for chills, diaphoresis and fever.   HENT: Negative for congestion, sinus pain and sore throat.    Eyes: Negative for blurred vision and double vision.   Respiratory: Negative for cough and sputum production.    Cardiovascular: Negative for chest pain, palpitations and leg swelling.   Gastrointestinal: Positive for abdominal pain (on the left side), nausea and vomiting.   Genitourinary: Negative for dysuria and urgency.   Musculoskeletal: Negative for back pain and myalgias.   Skin: Negative for itching and rash.   Neurological: Negative for tingling, sensory change and headaches.    Psychiatric/Behavioral: Negative for depression. The patient is not nervous/anxious.        Disposition/Barriers to discharge:   None    Consultants/Specialty  Diabetes Health Educator    PCP: Navi Huber M.D.      Quality Measures  Quality-Core Measures   Reviewed items::  Labs reviewed, Medications reviewed and Radiology images reviewed  Pitt catheter::  No Pitt  DVT prophylaxis pharmacological::  Enoxaparin (Lovenox)          Physical Exam       Vitals:    12/22/18 0715 12/22/18 0800 12/22/18 1135 12/22/18 1605   BP: 160/111  144/89 (!) 165/101   Pulse: 100  87 (!) 105   Resp: 16  18 19   Temp: 36.9 °C (98.5 °F)  36.7 °C (98 °F) 36.7 °C (98 °F)   TempSrc: Temporal  Temporal Temporal   SpO2: 95%  94% 93%   Weight:  86.5 kg (190 lb 11.2 oz)     Height:         Body mass index is 31.73 kg/m². Weight: 86.5 kg (190 lb 11.2 oz) (Entered wrong weight in the begining)  Oxygen Therapy:  Pulse Oximetry: 93 %, O2 (LPM): 1, O2 Delivery: None (Room Air)    Physical Exam   Constitutional: She is oriented to person, place, and time and well-developed, well-nourished, and in no distress.   HENT:   Head: Normocephalic and atraumatic.   Eyes: Pupils are equal, round, and reactive to light. No scleral icterus.   Neck: Normal range of motion. Neck supple.   Cardiovascular: Normal rate, regular rhythm and normal heart sounds.    Pulmonary/Chest: Effort normal and breath sounds normal. No respiratory distress.   Abdominal: Soft. Bowel sounds are normal. She exhibits no distension. There is tenderness (in the LUQ and discomfort in the epigastric region).   No CVA tenderness.   Musculoskeletal: Normal range of motion. She exhibits no edema.   Neurological: She is alert and oriented to person, place, and time.   Skin: Skin is warm. No erythema.   Psychiatric: Mood and affect normal.             Assessment/Plan     Pneumonia   Assessment & Plan    - h/o productive cough with sputum production  - CT shows small bilateral  pleural effusions and patchy bilateral lower lobe airspace opacities may be infectious/inflammatory  - Normal WBC, no fever  - Since infection will worsen her condition, will treat.    Plan:  - Azithromycin IV started; will change to oral after nausea resolution  - Repeat CXR tomorrow after IVF  - Guaifenesin for cough     Diabetic ketoacidosis (HCC)- (present on admission)   Assessment & Plan    - Likely secondary to medical noncompliance  - Stable for floor    Plan:   - Lantus 25 U  - ISS and accuchecks  - Hypoglycemia protocol  - Metoclopramide  - Electrolyte repletion  - CTM on tele with cardiac monitor     Intractable nausea and vomiting- (present on admission)   Assessment & Plan    - Likely due to DKA and gastroparesis  - Continue scheduled IV Reglan, continue Zofran and Compazine as needed     Gastroparesis- (present on admission)   Assessment & Plan    - Likely due to uncontrolled type 1 diabetes  - Continue home reglan  - Consider other agent long term given neurological risk in young pt     Diabetes type I (CMS-HCC)- (present on admission)   Assessment & Plan    - Poorly controlled, HbA1c 11.8; likely due to non compliance  - Mother states pt possibly ran out of rapid acting insulin due to insurance problems  - Multiple previous DKA admissions  - Diabetes education  - Continue Lantus 25U and ISS  - Hypoglycemic protocol     Dehydration- (present on admission)   Assessment & Plan    - Due to diabetic ketoacidosis and urine losses  - Continue IV hydration

## 2018-12-23 NOTE — PROGRESS NOTES
Internal Medicine Interval Note  Note Author: Shiraz Adame M.D.     Name Alis Sparks     1990   Age/Sex 28 y.o. female   MRN [id number]   Code Status FULL     After 5PM or if no immediate response to page, please call for cross-coverage  Attending/Team: Dr. Atkins/Jose Enrique See Patient List for primary contact information  Call [phone] to page    1st Call - Day Intern (R1):   Dr. Adame 2nd Call - Day Sr. Resident (R2/R3):   Dr. Marcano         Reason for interval visit  (Principal Problem)   Nausea, vomiting      Interval Problem Daily Status Update  (24 hours, problem oriented, brief subjective history, new lab/imaging data pertinent to that problem)   No acute events overnight. Patient seems a little worse than yesterday; is still c/o nausea and vomiting, also abdominal pain. She has not been eating or drinking anything due to nausea.  She is tachycardic, tachypneic and has elevated BP. Morning labs showed elevated glucose, low bicarb and a high anion gap. Insulin was increased from 25U to 30U and premeal insulin 2U TID ordered. IVF increased to run at 150ml/hr. Dilaudid discontinued and morphine started for abdominal pain. Repeat stat labs ordered. Low threshold for transfer to ICU.      Review of Systems   Constitutional: Negative for chills, diaphoresis and fever.   HENT: Negative for congestion, sinus pain and sore throat.    Eyes: Negative for blurred vision and double vision.   Respiratory: Negative for cough and sputum production.    Cardiovascular: Negative for chest pain, palpitations and leg swelling.   Gastrointestinal: Positive for abdominal pain (on the left side), nausea and vomiting.   Genitourinary: Negative for dysuria and urgency.   Musculoskeletal: Negative for back pain and myalgias.   Skin: Negative for itching and rash.   Neurological: Negative for tingling, sensory change and headaches.   Psychiatric/Behavioral: Negative for depression. The  patient is not nervous/anxious.        Disposition/Barriers to discharge:   None    Consultants/Specialty  Diabetes Health Educator    PCP: Navi Huber M.D.      Quality Measures  Quality-Core Measures   Reviewed items::  Labs reviewed, Medications reviewed and Radiology images reviewed  Pitt catheter::  No Pitt  DVT prophylaxis pharmacological::  Enoxaparin (Lovenox)          Physical Exam       Vitals:    12/23/18 0400 12/23/18 0741 12/23/18 0855 12/23/18 1151   BP: 159/107 155/102 135/79 146/96   Pulse: 96 (!) 130 (!) 122 (!) 134   Resp: (!) 156 20  19   Temp: 36.7 °C (98 °F) 37 °C (98.6 °F)  36.7 °C (98 °F)   TempSrc: Temporal Temporal  Temporal   SpO2: 100% 98%  96%   Weight:       Height:         Body mass index is 31.73 kg/m².    Oxygen Therapy:  Pulse Oximetry: 96 %, O2 Delivery: None (Room Air)    Physical Exam   Constitutional: She is oriented to person, place, and time and well-developed, well-nourished, and in no distress.   HENT:   Head: Normocephalic and atraumatic.   Eyes: Pupils are equal, round, and reactive to light. No scleral icterus.   Neck: Normal range of motion. Neck supple.   Cardiovascular: Normal rate, regular rhythm and normal heart sounds.    Pulmonary/Chest: Effort normal and breath sounds normal. No respiratory distress.   Abdominal: Soft. Bowel sounds are normal. She exhibits no distension. There is tenderness (in the LUQ and discomfort in the epigastric region).   No CVA tenderness.   Musculoskeletal: Normal range of motion. She exhibits no edema.   Neurological: She is alert and oriented to person, place, and time.   Skin: Skin is warm. No erythema.   Psychiatric: Mood and affect normal.             Assessment/Plan     Pneumonia   Assessment & Plan    Improving  - h/o productive cough with sputum production  - CT shows small bilateral pleural effusions and patchy bilateral lower lobe airspace opacities may be infectious/inflammatory  - Normal WBC, no fever  - Since  infection will worsen her condition, will treat.    Plan:  - Azithromycin IV started; will change to oral after nausea resolution  - Guaifenesin for cough     Diabetic ketoacidosis (HCC)- (present on admission)   Assessment & Plan    - Likely secondary to medical noncompliance  - Low threshold for ICU transfer due to elevated AG and low bicarb    Plan:   - Lantus 30 U  - Premeal 2U TID  - ISS and accuchecks  - Hypoglycemia protocol  - Pain management for abd pain  - Metoclopramide  - Electrolyte repletion  - CTM on tele with cardiac monitor     Intractable nausea and vomiting- (present on admission)   Assessment & Plan    - Likely due to DKA and gastroparesis  - Continue scheduled IV Reglan, continue Zofran and Compazine as needed     Gastroparesis- (present on admission)   Assessment & Plan    - Likely due to uncontrolled type 1 diabetes  - Continue home reglan  - Consider other agent long term given neurological risk in young pt     Diabetes type I (CMS-HCC)- (present on admission)   Assessment & Plan    - Poorly controlled, HbA1c 11.8; likely due to non compliance  - Mother states pt possibly ran out of rapid acting insulin due to insurance problems  - Multiple previous DKA admissions  - Diabetes education  - Continue Lantus and ISS  - Hypoglycemic protocol     Dehydration- (present on admission)   Assessment & Plan    - Due to diabetic ketoacidosis and urine losses  - Continue IV hydration

## 2018-12-23 NOTE — CARE PLAN
Problem: Bowel/Gastric:  Goal: Will not experience complications related to bowel motility  Outcome: PROGRESSING SLOWER THAN EXPECTED  Pt has hypoactive BS, n/vom, and not eating    Problem: Pain Management  Goal: Pain level will decrease to patient's comfort goal  Outcome: PROGRESSING SLOWER THAN EXPECTED  Pt still c/o left flank pain despite Dilaudid, but educated that Dilaudid could be contributing to n/vom. Alternate pain modalities offered; pt relaxing at this time

## 2018-12-23 NOTE — PROGRESS NOTES
Assumed care of pt at 0700.  Pt alert and oriented x4.  At start of shift, pt vomiting and c/o severe, sharp, left flank pain.  She vomited dark green emesis 3x this shift for me. Dr. Cobian aware.  Pt educated that Dilaudid could be contributing to nausea and vomiting but she still wanted to have it, along with Zofran.  BP and HR elevated at this time: 155 systolic and .  Zofran and Dilaudid given with good effect.  Later, Dr. Adame with [facility] at bedside and updated regarding continuous htn and tachycardia.  BP retaken and down to 135 systolic and .  MD stated she would converse with attending.  Bed low and locked, alarm set and call bell within reach.  Hourly rounding continues.    1130: MD's at bedside; BS is 224 at this time; Dr. Bajwa gave order to give 5 units insulin Lispro stat, as well as labs and an ABG.  Beta hydroxy came back >8 and pH came back critical at 7.16.  1000mL bolus initiated per order.  Dr. Cobian updated with lab results and placed transfer order to ICU at 1300.  This RN stayed in pt's room w/ pt until ICU nurse at bedside.  Report given to CAIT Jarrell.

## 2018-12-24 ENCOUNTER — APPOINTMENT (OUTPATIENT)
Dept: RADIOLOGY | Facility: MEDICAL CENTER | Age: 28
DRG: 637 | End: 2018-12-24
Attending: INTERNAL MEDICINE
Payer: MEDICAID

## 2018-12-24 LAB
ANION GAP SERPL CALC-SCNC: 8 MMOL/L (ref 0–11.9)
ANION GAP SERPL CALC-SCNC: 9 MMOL/L (ref 0–11.9)
ANION GAP SERPL CALC-SCNC: 9 MMOL/L (ref 0–11.9)
B-OH-BUTYR SERPL-MCNC: 1.6 MMOL/L (ref 0.02–0.27)
BASOPHILS # BLD AUTO: 0.1 % (ref 0–1.8)
BASOPHILS # BLD: 0.01 K/UL (ref 0–0.12)
BUN SERPL-MCNC: 3 MG/DL (ref 8–22)
BUN SERPL-MCNC: 4 MG/DL (ref 8–22)
BUN SERPL-MCNC: 5 MG/DL (ref 8–22)
CALCIUM SERPL-MCNC: 7.9 MG/DL (ref 8.5–10.5)
CALCIUM SERPL-MCNC: 8.1 MG/DL (ref 8.5–10.5)
CALCIUM SERPL-MCNC: 8.2 MG/DL (ref 8.5–10.5)
CHLORIDE SERPL-SCNC: 109 MMOL/L (ref 96–112)
CHLORIDE SERPL-SCNC: 111 MMOL/L (ref 96–112)
CHLORIDE SERPL-SCNC: 112 MMOL/L (ref 96–112)
CO2 SERPL-SCNC: 14 MMOL/L (ref 20–33)
CO2 SERPL-SCNC: 15 MMOL/L (ref 20–33)
CO2 SERPL-SCNC: 18 MMOL/L (ref 20–33)
CREAT SERPL-MCNC: 0.43 MG/DL (ref 0.5–1.4)
CREAT SERPL-MCNC: 0.46 MG/DL (ref 0.5–1.4)
CREAT SERPL-MCNC: 0.49 MG/DL (ref 0.5–1.4)
CRP SERPL HS-MCNC: 0.18 MG/DL (ref 0–0.75)
EOSINOPHIL # BLD AUTO: 0.06 K/UL (ref 0–0.51)
EOSINOPHIL NFR BLD: 0.8 % (ref 0–6.9)
ERYTHROCYTE [DISTWIDTH] IN BLOOD BY AUTOMATED COUNT: 39.7 FL (ref 35.9–50)
GLUCOSE BLD-MCNC: 116 MG/DL (ref 65–99)
GLUCOSE BLD-MCNC: 129 MG/DL (ref 65–99)
GLUCOSE BLD-MCNC: 138 MG/DL (ref 65–99)
GLUCOSE BLD-MCNC: 142 MG/DL (ref 65–99)
GLUCOSE BLD-MCNC: 143 MG/DL (ref 65–99)
GLUCOSE BLD-MCNC: 143 MG/DL (ref 65–99)
GLUCOSE BLD-MCNC: 144 MG/DL (ref 65–99)
GLUCOSE BLD-MCNC: 147 MG/DL (ref 65–99)
GLUCOSE BLD-MCNC: 149 MG/DL (ref 65–99)
GLUCOSE BLD-MCNC: 149 MG/DL (ref 65–99)
GLUCOSE BLD-MCNC: 150 MG/DL (ref 65–99)
GLUCOSE BLD-MCNC: 154 MG/DL (ref 65–99)
GLUCOSE BLD-MCNC: 166 MG/DL (ref 65–99)
GLUCOSE BLD-MCNC: 167 MG/DL (ref 65–99)
GLUCOSE BLD-MCNC: 168 MG/DL (ref 65–99)
GLUCOSE BLD-MCNC: 171 MG/DL (ref 65–99)
GLUCOSE BLD-MCNC: 173 MG/DL (ref 65–99)
GLUCOSE BLD-MCNC: 178 MG/DL (ref 65–99)
GLUCOSE BLD-MCNC: 180 MG/DL (ref 65–99)
GLUCOSE BLD-MCNC: 190 MG/DL (ref 65–99)
GLUCOSE BLD-MCNC: 209 MG/DL (ref 65–99)
GLUCOSE SERPL-MCNC: 147 MG/DL (ref 65–99)
GLUCOSE SERPL-MCNC: 156 MG/DL (ref 65–99)
GLUCOSE SERPL-MCNC: 215 MG/DL (ref 65–99)
HCT VFR BLD AUTO: 36.4 % (ref 37–47)
HGB BLD-MCNC: 12.5 G/DL (ref 12–16)
IMM GRANULOCYTES # BLD AUTO: 0.04 K/UL (ref 0–0.11)
IMM GRANULOCYTES NFR BLD AUTO: 0.5 % (ref 0–0.9)
LYMPHOCYTES # BLD AUTO: 1.56 K/UL (ref 1–4.8)
LYMPHOCYTES NFR BLD: 19.5 % (ref 22–41)
MAGNESIUM SERPL-MCNC: 2 MG/DL (ref 1.5–2.5)
MAGNESIUM SERPL-MCNC: 2.2 MG/DL (ref 1.5–2.5)
MCH RBC QN AUTO: 29.4 PG (ref 27–33)
MCHC RBC AUTO-ENTMCNC: 34.3 G/DL (ref 33.6–35)
MCV RBC AUTO: 85.6 FL (ref 81.4–97.8)
MONOCYTES # BLD AUTO: 1.02 K/UL (ref 0–0.85)
MONOCYTES NFR BLD AUTO: 12.8 % (ref 0–13.4)
NEUTROPHILS # BLD AUTO: 5.3 K/UL (ref 2–7.15)
NEUTROPHILS NFR BLD: 66.3 % (ref 44–72)
NRBC # BLD AUTO: 0 K/UL
NRBC BLD-RTO: 0 /100 WBC
PHOSPHATE SERPL-MCNC: 2.4 MG/DL (ref 2.5–4.5)
PHOSPHATE SERPL-MCNC: 2.5 MG/DL (ref 2.5–4.5)
PLATELET # BLD AUTO: 221 K/UL (ref 164–446)
PMV BLD AUTO: 9.9 FL (ref 9–12.9)
POTASSIUM SERPL-SCNC: 3.4 MMOL/L (ref 3.6–5.5)
POTASSIUM SERPL-SCNC: 3.5 MMOL/L (ref 3.6–5.5)
POTASSIUM SERPL-SCNC: 3.6 MMOL/L (ref 3.6–5.5)
PREALB SERPL-MCNC: 15 MG/DL (ref 18–38)
RBC # BLD AUTO: 4.25 M/UL (ref 4.2–5.4)
SODIUM SERPL-SCNC: 134 MMOL/L (ref 135–145)
SODIUM SERPL-SCNC: 135 MMOL/L (ref 135–145)
SODIUM SERPL-SCNC: 136 MMOL/L (ref 135–145)
WBC # BLD AUTO: 8 K/UL (ref 4.8–10.8)

## 2018-12-24 PROCEDURE — 84134 ASSAY OF PREALBUMIN: CPT

## 2018-12-24 PROCEDURE — 80048 BASIC METABOLIC PNL TOTAL CA: CPT

## 2018-12-24 PROCEDURE — 700101 HCHG RX REV CODE 250: Performed by: INTERNAL MEDICINE

## 2018-12-24 PROCEDURE — 84100 ASSAY OF PHOSPHORUS: CPT

## 2018-12-24 PROCEDURE — 700102 HCHG RX REV CODE 250 W/ 637 OVERRIDE(OP): Performed by: INTERNAL MEDICINE

## 2018-12-24 PROCEDURE — 770022 HCHG ROOM/CARE - ICU (200)

## 2018-12-24 PROCEDURE — A9270 NON-COVERED ITEM OR SERVICE: HCPCS | Performed by: STUDENT IN AN ORGANIZED HEALTH CARE EDUCATION/TRAINING PROGRAM

## 2018-12-24 PROCEDURE — 700111 HCHG RX REV CODE 636 W/ 250 OVERRIDE (IP): Performed by: INTERNAL MEDICINE

## 2018-12-24 PROCEDURE — 99233 SBSQ HOSP IP/OBS HIGH 50: CPT | Performed by: INTERNAL MEDICINE

## 2018-12-24 PROCEDURE — 700105 HCHG RX REV CODE 258: Performed by: INTERNAL MEDICINE

## 2018-12-24 PROCEDURE — 36600 WITHDRAWAL OF ARTERIAL BLOOD: CPT

## 2018-12-24 PROCEDURE — 700101 HCHG RX REV CODE 250: Performed by: STUDENT IN AN ORGANIZED HEALTH CARE EDUCATION/TRAINING PROGRAM

## 2018-12-24 PROCEDURE — 700111 HCHG RX REV CODE 636 W/ 250 OVERRIDE (IP): Performed by: STUDENT IN AN ORGANIZED HEALTH CARE EDUCATION/TRAINING PROGRAM

## 2018-12-24 PROCEDURE — 700102 HCHG RX REV CODE 250 W/ 637 OVERRIDE(OP): Performed by: STUDENT IN AN ORGANIZED HEALTH CARE EDUCATION/TRAINING PROGRAM

## 2018-12-24 PROCEDURE — 82962 GLUCOSE BLOOD TEST: CPT

## 2018-12-24 PROCEDURE — 700105 HCHG RX REV CODE 258: Performed by: STUDENT IN AN ORGANIZED HEALTH CARE EDUCATION/TRAINING PROGRAM

## 2018-12-24 PROCEDURE — 83735 ASSAY OF MAGNESIUM: CPT

## 2018-12-24 PROCEDURE — 85025 COMPLETE CBC W/AUTO DIFF WBC: CPT

## 2018-12-24 PROCEDURE — 86140 C-REACTIVE PROTEIN: CPT

## 2018-12-24 PROCEDURE — 82010 KETONE BODYS QUAN: CPT

## 2018-12-24 PROCEDURE — 302136 NUTRITION PUMP: Performed by: INTERNAL MEDICINE

## 2018-12-24 PROCEDURE — C9113 INJ PANTOPRAZOLE SODIUM, VIA: HCPCS | Performed by: STUDENT IN AN ORGANIZED HEALTH CARE EDUCATION/TRAINING PROGRAM

## 2018-12-24 RX ORDER — DEXTROSE AND SODIUM CHLORIDE 5; .45 G/100ML; G/100ML
INJECTION, SOLUTION INTRAVENOUS CONTINUOUS
Status: DISCONTINUED | OUTPATIENT
Start: 2018-12-24 | End: 2018-12-29

## 2018-12-24 RX ORDER — POTASSIUM CHLORIDE 7.45 MG/ML
10 INJECTION INTRAVENOUS
Status: COMPLETED | OUTPATIENT
Start: 2018-12-24 | End: 2018-12-24

## 2018-12-24 RX ORDER — POTASSIUM CHLORIDE 20 MEQ/1
40 TABLET, EXTENDED RELEASE ORAL ONCE
Status: DISCONTINUED | OUTPATIENT
Start: 2018-12-24 | End: 2018-12-24

## 2018-12-24 RX ORDER — AZITHROMYCIN 250 MG/1
250 TABLET, FILM COATED ORAL DAILY
Status: COMPLETED | OUTPATIENT
Start: 2018-12-24 | End: 2018-12-24

## 2018-12-24 RX ORDER — POTASSIUM CHLORIDE 7.45 MG/ML
10 INJECTION INTRAVENOUS
Status: DISPENSED | OUTPATIENT
Start: 2018-12-24 | End: 2018-12-24

## 2018-12-24 RX ORDER — DEXTROSE MONOHYDRATE 25 G/50ML
12.5-25 INJECTION, SOLUTION INTRAVENOUS PRN
Status: DISCONTINUED | OUTPATIENT
Start: 2018-12-24 | End: 2018-12-25

## 2018-12-24 RX ADMIN — METOCLOPRAMIDE 10 MG: 5 INJECTION, SOLUTION INTRAMUSCULAR; INTRAVENOUS at 12:52

## 2018-12-24 RX ADMIN — POTASSIUM CHLORIDE 10 MEQ: 7.46 INJECTION, SOLUTION INTRAVENOUS at 06:10

## 2018-12-24 RX ADMIN — DEXTROSE AND SODIUM CHLORIDE: 10; .45 INJECTION, SOLUTION INTRAVENOUS at 03:30

## 2018-12-24 RX ADMIN — POTASSIUM CHLORIDE 10 MEQ: 7.46 INJECTION, SOLUTION INTRAVENOUS at 08:06

## 2018-12-24 RX ADMIN — MAGNESIUM SULFATE IN WATER 2 G: 40 INJECTION, SOLUTION INTRAVENOUS at 00:53

## 2018-12-24 RX ADMIN — DEXTROSE AND SODIUM CHLORIDE: 5; 450 INJECTION, SOLUTION INTRAVENOUS at 12:05

## 2018-12-24 RX ADMIN — METOCLOPRAMIDE 10 MG: 5 INJECTION, SOLUTION INTRAMUSCULAR; INTRAVENOUS at 05:53

## 2018-12-24 RX ADMIN — SODIUM CHLORIDE 4 UNITS/HR: 9 INJECTION, SOLUTION INTRAVENOUS at 12:50

## 2018-12-24 RX ADMIN — SODIUM CHLORIDE 4 UNITS/HR: 9 INJECTION, SOLUTION INTRAVENOUS at 07:32

## 2018-12-24 RX ADMIN — PROCHLORPERAZINE EDISYLATE 10 MG: 5 INJECTION INTRAMUSCULAR; INTRAVENOUS at 23:59

## 2018-12-24 RX ADMIN — MORPHINE SULFATE 2 MG: 10 INJECTION INTRAVENOUS at 14:33

## 2018-12-24 RX ADMIN — PROCHLORPERAZINE EDISYLATE 10 MG: 5 INJECTION INTRAMUSCULAR; INTRAVENOUS at 06:02

## 2018-12-24 RX ADMIN — METOCLOPRAMIDE 10 MG: 5 INJECTION, SOLUTION INTRAMUSCULAR; INTRAVENOUS at 17:11

## 2018-12-24 RX ADMIN — PROCHLORPERAZINE EDISYLATE 10 MG: 5 INJECTION INTRAMUSCULAR; INTRAVENOUS at 17:11

## 2018-12-24 RX ADMIN — MORPHINE SULFATE 2 MG: 10 INJECTION INTRAVENOUS at 00:53

## 2018-12-24 RX ADMIN — STANDARDIZED SENNA CONCENTRATE AND DOCUSATE SODIUM 2 TABLET: 8.6; 5 TABLET, FILM COATED ORAL at 17:11

## 2018-12-24 RX ADMIN — MORPHINE SULFATE 2 MG: 10 INJECTION INTRAVENOUS at 20:28

## 2018-12-24 RX ADMIN — PANTOPRAZOLE SODIUM 40 MG: 40 INJECTION, POWDER, FOR SOLUTION INTRAVENOUS at 05:53

## 2018-12-24 RX ADMIN — AZITHROMYCIN MONOHYDRATE 250 MG: 250 TABLET ORAL at 23:58

## 2018-12-24 RX ADMIN — DIPHENHYDRAMINE HYDROCHLORIDE 25 MG: 50 INJECTION, SOLUTION INTRAMUSCULAR; INTRAVENOUS at 05:53

## 2018-12-24 RX ADMIN — ONDANSETRON 4 MG: 2 INJECTION INTRAMUSCULAR; INTRAVENOUS at 20:23

## 2018-12-24 RX ADMIN — MORPHINE SULFATE 2 MG: 10 INJECTION INTRAVENOUS at 04:53

## 2018-12-24 RX ADMIN — POTASSIUM PHOSPHATE, MONOBASIC AND POTASSIUM PHOSPHATE, DIBASIC 15 MMOL: 224; 236 INJECTION, SOLUTION INTRAVENOUS at 09:47

## 2018-12-24 RX ADMIN — ONDANSETRON 4 MG: 2 INJECTION INTRAMUSCULAR; INTRAVENOUS at 07:59

## 2018-12-24 RX ADMIN — PROCHLORPERAZINE EDISYLATE 10 MG: 5 INJECTION INTRAMUSCULAR; INTRAVENOUS at 12:59

## 2018-12-24 RX ADMIN — ONDANSETRON 4 MG: 2 INJECTION INTRAMUSCULAR; INTRAVENOUS at 13:56

## 2018-12-24 RX ADMIN — POTASSIUM PHOSPHATE, MONOBASIC AND POTASSIUM PHOSPHATE, DIBASIC 30 MMOL: 224; 236 INJECTION, SOLUTION INTRAVENOUS at 00:41

## 2018-12-24 RX ADMIN — ENOXAPARIN SODIUM 40 MG: 100 INJECTION SUBCUTANEOUS at 05:53

## 2018-12-24 RX ADMIN — METOCLOPRAMIDE 10 MG: 5 INJECTION, SOLUTION INTRAMUSCULAR; INTRAVENOUS at 00:53

## 2018-12-24 RX ADMIN — DEXTROSE AND SODIUM CHLORIDE: 10; .45 INJECTION, SOLUTION INTRAVENOUS at 07:40

## 2018-12-24 RX ADMIN — POLYETHYLENE GLYCOL 3350 1 PACKET: 17 POWDER, FOR SOLUTION ORAL at 16:53

## 2018-12-24 RX ADMIN — POTASSIUM CHLORIDE 10 MEQ: 7.46 INJECTION, SOLUTION INTRAVENOUS at 03:31

## 2018-12-24 RX ADMIN — METOCLOPRAMIDE 10 MG: 5 INJECTION, SOLUTION INTRAMUSCULAR; INTRAVENOUS at 23:59

## 2018-12-24 ASSESSMENT — ENCOUNTER SYMPTOMS
BLURRED VISION: 0
COUGH: 0
DEPRESSION: 0
DOUBLE VISION: 0
PALPITATIONS: 0
CHILLS: 0
BACK PAIN: 0
ABDOMINAL PAIN: 1
SENSORY CHANGE: 0
SPUTUM PRODUCTION: 0
SINUS PAIN: 0
SORE THROAT: 0
FEVER: 0
HEADACHES: 0
TINGLING: 0
VOMITING: 1
NAUSEA: 1
MYALGIAS: 0
DIAPHORESIS: 0
NERVOUS/ANXIOUS: 0

## 2018-12-24 ASSESSMENT — PAIN SCALES - GENERAL
PAINLEVEL_OUTOF10: 4
PAINLEVEL_OUTOF10: 6
PAINLEVEL_OUTOF10: 6
PAINLEVEL_OUTOF10: 4
PAINLEVEL_OUTOF10: 6
PAINLEVEL_OUTOF10: 6
PAINLEVEL_OUTOF10: 8
PAINLEVEL_OUTOF10: 4
PAINLEVEL_OUTOF10: 8

## 2018-12-24 NOTE — CARE PLAN
Problem: Safety  Goal: Will remain free from falls  Outcome: PROGRESSING AS EXPECTED  Pt is alert and oriented x4 and calls for help appropriately. Call light is within reach and bed alarm is on.     Problem: Pain Management  Goal: Pain level will decrease to patient's comfort goal  Outcome: PROGRESSING AS EXPECTED  Pt receiving prn pain medication that she states is effective in controlling her pain. Will continue to monitor.

## 2018-12-24 NOTE — PROGRESS NOTES
Cortrak Placement    Tube Team verified patient name and medical record number prior to tube placement.  Cortrak tube (43 inches, 10 [place]) placed at 78 cm in right nare.  Per Cortrak picture, tube appears to be in the small bowel.  Nursing Instructions: Awaiting KUB to confirm placement before use for medications or feeding. Once placement confirmed, flush tube with 30 ml of water, and then remove and save stylet, in patient medication drawer.

## 2018-12-24 NOTE — PROGRESS NOTES
Internal Medicine Interval Note  Note Author: Hemant Souza M.D.     Name Alis Sparks     1990   Age/Sex 28 y.o. female   MRN [id number]   Code Status FULL     After 5PM or if no immediate response to page, please call for cross-coverage  Attending/Team: Dr. Lincoln/José Manuel See Patient List for primary contact information  Call [phone] to page    1st Call -  Day Sr. Resident (R2/R3):   Dr. Souza 2nd Call         Reason for interval visit  (Principal Problem)   Nausea, vomiting    HPI (per Dr. Sanchez):              Pt is a 27 yo F presenting with severe N/V this morning. Pt has a PMH of DM1 (most recent Ha1c on 11/10 was 11.8; hx of peripheral neuropathy, gastroparesis, autonomic dysfunction, and nephropathy), inappropriate sinus tach, and medical noncompliance. Pt developed nausea ~2 weeks ago, however she has a hx of gastroparesis and was resistant to see a healthcare provider. Sometime in the recent past mother notes daughters insurance no longer covered her rapid acting insulin glulisine. Mother notes a near yearly history of DKA admissions since her diagnosis at age 17. Her symptoms culminated the morning of presentation with severe N/V when her mother prompted her to present to the ED.              In the ED pt was noted to have sugars in 400s and slight leukocytosis. Venous ABG showed pH 6.9. Beta hydroxybutyric acid was >8. Bicarb was <5 and AG was 25. Pt was given LR bolus and started on insulin drip. Pt was then admitted to the ICU for further mgmt of her DKA.      Interval Problem Daily Status Update  (24 hours, problem oriented, brief subjective history, new lab/imaging data pertinent to that problem)     Transferred back to ICU yesterday due to increasing anion gap, likely secondary to mixture of mild diabetic ketoacidosis and starvation ketosis.  Anion gap closed overnight.    NEURO:  Persistent nauseated and vomiting.  Scheduled metoclopramide and diphenhydramine.   Diphenhydramine discontinued due to anticholinergic effects.  Zofran and Compazine for breakthrough.  CVS: HR 90-110s      -160s/80-100s.  PULM:  RR  11-23        SpO2 97% on room air  GI:  NPO except medications.  Susanne ordered for tube feeds per nutrition recommendations.  :  I/O 6161/3525 (+2.6 L)  Anion gap closed 14>>>>9.  CO2 8>>>>15.  Potassium 3.5, Mg 2.2.  IVF D10 1/2 NS at 140 mL/hr.  HEME/ID:  Tmax 99.4  Tc 98.6, WBC 8.0, Hemoglobin 12.5  ENDO:  Blood glucose  138-167.  Continue insulin drip per protocol at 4 units/hr. Continue until anion gap <12 and CO2 >17.  Plan to transition to subcutaneous insulin once tolerating oral intake.  DVT:  Enoxaparin.      Review of Systems   Constitutional: Negative for chills, diaphoresis and fever.   HENT: Negative for congestion, sinus pain and sore throat.    Eyes: Negative for blurred vision and double vision.   Respiratory: Negative for cough and sputum production.    Cardiovascular: Negative for chest pain, palpitations and leg swelling.   Gastrointestinal: Positive for abdominal pain (on the left side), nausea and vomiting.   Genitourinary: Negative for dysuria and urgency.   Musculoskeletal: Negative for back pain and myalgias.   Skin: Negative for itching and rash.   Neurological: Negative for tingling, sensory change and headaches.   Psychiatric/Behavioral: Negative for depression. The patient is not nervous/anxious.        Disposition/Barriers to discharge:   None    Consultants/Specialty  Diabetes Health Educator    PCP: Navi Huber M.D.      Quality Measures  Quality-Core Measures   Reviewed items::  Labs reviewed, Medications reviewed, Radiology images reviewed and EKG reviewed  Pitt catheter::  No Pitt  DVT prophylaxis pharmacological::  Enoxaparin (Lovenox)  Ulcer Prophylaxis::  Yes  Antibiotics:  Treating active infection/contamination beyond 24 hours perioperative coverage          Physical Exam       Vitals:    12/24/18 0500  12/24/18 0600 12/24/18 0700 12/24/18 0800   BP:       Pulse: 98 96 (!) 103 (!) 116   Resp: 15 15 (!) 23 20   Temp:  37.3 °C (99.2 °F)  36.6 °C (97.9 °F)   TempSrc:  Temporal  Temporal   SpO2: 97% 97% 96% 97%   Weight:  82.9 kg (182 lb 12.2 oz)     Height:         Body mass index is 30.41 kg/m². Weight: 82.9 kg (182 lb 12.2 oz)  Oxygen Therapy:  Pulse Oximetry: 97 %, O2 Delivery: None (Room Air)    Physical Exam   Constitutional: She is oriented to person, place, and time. She appears distressed (mild due to nausea and vomiting).   HENT:   Head: Normocephalic and atraumatic.   Right Ear: External ear normal.   Left Ear: External ear normal.   Mouth/Throat: Oropharynx is clear and moist. No oropharyngeal exudate.   Eyes: Pupils are equal, round, and reactive to light. Right eye exhibits no discharge. Left eye exhibits no discharge. No scleral icterus.   Neck: Normal range of motion. Neck supple. No JVD present. No tracheal deviation present. No thyromegaly present.   Cardiovascular: Normal rate, regular rhythm, normal heart sounds and intact distal pulses.  Exam reveals no gallop and no friction rub.    No murmur heard.  Pulmonary/Chest: Effort normal and breath sounds normal. No respiratory distress. She has no wheezes. She has no rales.   Abdominal: Soft. Bowel sounds are normal. She exhibits no distension and no mass. There is tenderness (diffuse abdominal pain to direct palpation). There is no rebound and no guarding.   Musculoskeletal: Normal range of motion. She exhibits no edema, tenderness or deformity.   Neurological: She is alert and oriented to person, place, and time. No cranial nerve deficit. She exhibits normal muscle tone.   Skin: Skin is warm. No rash noted. She is not diaphoretic. No erythema. No pallor.   Psychiatric: Mood and affect normal.   Nursing note and vitals reviewed.            Assessment/Plan     Pneumonia   Assessment & Plan    More likely pneumonitis. Symptoms improving.  - h/o  productive cough with sputum production  - CT shows small bilateral pleural effusions and patchy bilateral lower lobe airspace opacities may be infectious/inflammatory  - Normal WBC, afebrile    Plan:  - Continue course of azithromycin IV started 12/21.  - Continue guaifenesin for cough.     Diabetic ketoacidosis (HCC)- (present on admission)   Assessment & Plan    Per mother, patient has been compliant but was not feeling well.  Suspect possible viral infection, which may have precipitated.  Likely mixed with starvation ketosis since has not been eating.  Anion gap closed 14>>>>9.  CO2 8>>>>15.    Plan:   -Continue insulin drip per protocol until anion gap <12 and CO2 >17.  -Continue D10 1/2 NS at 140 mL/hr.     Intractable nausea and vomiting- (present on admission)   Assessment & Plan    - Likely due to DKA and gastroparesis  Plan:  - Continue scheduled metoclopramide 10 mg IV every 6 hours.  - Discontinue diphenhydramine 25 mg IV every 6 hours.  - Continue Zofran and Compazine for breakthrough nausea as needed.  - Continue NPO except medications.  - Order for Cortrak placed to start tube feeds per nutrition recommendations.  - Gastric emptying study.     Hypokalemia- (present on admission)   Assessment & Plan    Potassium low 3.5. Likely secondary to insulin drip.  Potassium goal >4.0.  Magnesium  2.2, goal >2.0.  Phosphorus goal >3.0.  Plan:  -Continue to monitor and replace per potassium replacement protocol.      Gastroparesis- (present on admission)   Assessment & Plan    - Likely due to uncontrolled type 1 diabetes  - Continue home reglan  - Consider other agent long term given neurological risk in young pt     Diabetes type I (CMS-HCC)- (present on admission)   Assessment & Plan    - Poorly controlled, HbA1c 11.8; likely due to non compliance  - Mother states patient was taking insulin but was feeling ill.  Per records, also may have possibly run out of rapid acting insulin due to insurance problems.  -  Multiple previous DKA admissions.  - Diabetes education.  - Continue insulin drip per protocol as per above.  - Hypoglycemic protocol.     Dehydration- (present on admission)   Assessment & Plan    - Due to diabetic ketoacidosis and urine losses  - Continue IV hydration

## 2018-12-24 NOTE — PROGRESS NOTES
Report received from day shift RN. Pt resting in bed at this time. Call light is within reach and bed alarm is on.

## 2018-12-24 NOTE — PROGRESS NOTES
Pt arrived to ICU. Monitor on. Discussed poc, unit routine. All questions/concerns addressed. Fall precautions in place. Dr Alan notified of pt's arrival.

## 2018-12-24 NOTE — PROGRESS NOTES
Report given to day shift RN. Pt resting in bed at this time. Call light is within reach and bed alarm is on. Pt's mother at bedside.

## 2018-12-24 NOTE — PROGRESS NOTES
Dr. Ivy notified of NM exam that patient unable to keep food down, so the exam can't be done at this time. Will reassess when patient feels better.

## 2018-12-24 NOTE — CARE PLAN
Problem: Bowel/Gastric:  Goal: Normal bowel function is maintained or improved  Outcome: PROGRESSING SLOWER THAN EXPECTED  Patient has not had bowel movement since 12/20, bowel protocol will be started today.     Problem: Fluid Volume:  Goal: Will maintain balanced intake and output  Outcome: PROGRESSING AS EXPECTED  Patient has IV fluid running, NPO at this time due to DKA protocol.

## 2018-12-24 NOTE — DIETARY
"Nutrition Services: Nutrition Support Assessment     Day 6 of admit.  27 yo female with admitting diagnosis: DKA      Current problem list:  1. Type 1 DM, frequent DKA   2. PNA  3. Intractable N/V   4. Gastroparesis      Assessment:  Estimated Nutritional Needs: based on height: 65\", weight 83 kg via bed scale on 12/24, IBW 56.7 kg (146% of ideal body weight), BMI:  30.4 - obese, class I      Calculation/Equation: MSJ x 1.1 = 1715 kcals   Calories/day: 1650 - 2000 kcals (20 - 25 kcals/kg)   Protein/day: 85 - 115 gm (1 - 1.4 gm/kg)      Evaluation:   1. Pt has not had adequate nutrition x 6 days d/t poor po intake despite scheduled compazine therefore cortrak is to be placed and nutrition support is to be initiated   2. Serum Glucose 147, BUN 4, creat 0.46, Phos 2.4, K and Mg WNL   3. Receiving D10 and NaCl at 140 mL/hr which is providing and additonal 1140 kcals/day; Reglan, Insulin, Protonix  4. Last BM 12/20 - bowel protocol initiated, refused senna today per MAR   5. No skin breakdown noted at this time   6. Will provide specialized, CHO-controlled TF formula of Diabetisource AC which pt will benefit from to help manage blood sugars and she will receive Omega-3 fatty acids which will help reduce inflammation      Recommendations/Plan:  · TF goal: Diabetisource AC at 60 mL/hr which will provide 1728 kcals, 86 gm protein, 1180 mL free water and 145 gm CHO per day   · Fluids per MD   · Monitor wt and lab trends   · Advance to PO diet as pt is able   · RD to monitor TF tolerance and meds; will adjust accordingly    RD following          "

## 2018-12-24 NOTE — PROGRESS NOTES
"Critical Care Progress Note    Date of admission  12/18/2018    Chief Complaint  \"28 y.o. female past medical history of type 1 diabetes, frequent DKA, gastroparesis who presented 12/18/2018 with 2 weeks of worsening nausea, vomiting, abdominal pain with overall fatigue and malaise.  Mother reports the patient also had some upper respiratory symptoms prior however this has resolved.  She presented to the emergency department and was found to be in diabetic ketoacidosis with a CO2 of 5, glucose 369, anion gap 25, WBC 15.8, hemoglobin 16.6, platelet 303.  She was given 2 L of IV fluids and started on an insulin infusion.  She is admitted to the ICU for further care of her diabetic ketoacidosis.  At this time patient complains of left upper quadrant pain, left flank pain and headache.\"    Hospital Course    12/18/2018: admitted for DKA  12/19/2018: Transitioned to SSI  12/20/2018: Transferred to medical floor  12/23/2018: Return to ICU for ketoacidosis      Interval Problem Update  Reviewed last 24 hour events:    Patient returns to the ICU with recurrent ketoacidosis, glucose mildly elevated with significant beta hydroxybutyric acid level (>8).  She has been vomiting for the last several days and it appears the medical service has been unable to control her gastroparesis symptoms.  The patient reports continued left flank, CVA,  left upper quadrant pain with palpation.  She denies any current cough, chest pain, shortness of breath.  Her labs on the time of transfer include a sodium 137, potassium 4.2, chloride 108, CO2 6, glucose 258, BUN 7, creatinine 0.6, anion gap 23.  Her CBC this morning was grossly unremarkable.  At this time patient has no complaints other than her persistent emesis and fatigue.    Review of Systems  Review of Systems   Constitutional: Positive for malaise/fatigue. Negative for chills and fever.   Respiratory: Negative for cough, sputum production, shortness of breath and stridor.  "   Cardiovascular: Negative for chest pain.   Gastrointestinal: Positive for nausea and vomiting. Negative for abdominal pain.   Genitourinary: Negative for dysuria and frequency.   Musculoskeletal: Negative for myalgias.   Skin: Negative for rash.   Neurological: Negative for dizziness, sensory change, focal weakness and headaches.   Endo/Heme/Allergies: Positive for polydipsia.        Vital Signs for last 24 hours   Temp:  [36.7 °C (98 °F)-37.6 °C (99.6 °F)] 37.6 °C (99.6 °F)  Pulse:  [] 142  Resp:  [] 21  BP: (135-159)/() 146/96    Hemodynamic parameters for last 24 hours       Respiratory       Physical Exam   Physical Exam   Constitutional: She is oriented to person, place, and time. She appears well-developed and well-nourished. She has a sickly appearance. She appears distressed.   HENT:   Head: Normocephalic and atraumatic.   Right Ear: External ear normal.   Left Ear: External ear normal.   Nose: Nose normal.   Eyes: Pupils are equal, round, and reactive to light. Conjunctivae are normal.   Neck: Neck supple. No JVD present. No tracheal deviation present.   Cardiovascular: Normal rate, regular rhythm and intact distal pulses.    Pulmonary/Chest: Breath sounds normal. No accessory muscle usage. No respiratory distress.   Abdominal: Soft. Bowel sounds are normal. She exhibits no distension. There is tenderness (mild, diffuse) in the left upper quadrant.   Musculoskeletal: Normal range of motion. She exhibits no tenderness or deformity.   Neurological: She is alert and oriented to person, place, and time. She exhibits normal muscle tone. Coordination normal.   Skin: Skin is warm and dry. No rash noted.   Psychiatric: She has a normal mood and affect. Her behavior is normal.   Nursing note and vitals reviewed.      Medications  Current Facility-Administered Medications   Medication Dose Route Frequency Provider Last Rate Last Dose   • LORazepam (ATIVAN) injection 0.5 mg  0.5 mg Intravenous Once  Hi Cortez M.D.   Stopped at 12/23/18 0800   • NS infusion   Intravenous Continuous Aydee Marcano M.D.   Stopped at 12/23/18 1512   • morphine (pf) 10 mg/mL injection 2 mg  2 mg Intravenous Q4HRS PRN Aydee Marcano M.D.   2 mg at 12/23/18 1556   • dextrose 10% and 0.45% NaCl infusion   Intravenous Continuous Chely Orr M.D. 140 mL/hr at 12/23/18 1717     • MD ALERT-PHARMACY TO CONSULT FOR DKA MONITORING 1 Each  1 Each Other PRN Chely Orr M.D.       • potassium phosphates 30 mmol in  mL ivpb  30 mmol Intravenous Once PRN Chely Orr M.D.       • Adult DKA potassium(K+) replacement scale  1 Each Intravenous Q4HRS Chely Orr M.D.   1 Each at 12/23/18 1400   • D5 NS infusion   Intravenous Continuous Chely Orr M.D.   Stopped at 12/23/18 1717   • insulin regular human (HUMULIN/NOVOLIN R) 62.5 Units in  mL Infusion for DKA  4 Units/hr Intravenous Continuous Chely Orr M.D. 16 mL/hr at 12/23/18 1518 4 Units/hr at 12/23/18 1518   • pantoprazole (PROTONIX) injection 40 mg  40 mg Intravenous DAILY Shiraz Adame M.D.   40 mg at 12/23/18 0540   • guaiFENesin LA (MUCINEX) tablet 600 mg  600 mg Oral Q12HRS Shiraz Adame M.D.   Stopped at 12/22/18 0600   • azithromycin (ZITHROMAX) 500 mg in D5W 250 mL IVPB  500 mg Intravenous Q24HRS Shiraz Adame M.D.   Stopped at 12/22/18 2050   • prochlorperazine (COMPAZINE) injection 10 mg  10 mg Intravenous Q6HRS PRN Jeremy M Gonda, M.D.   10 mg at 12/23/18 1556   • metoclopramide (REGLAN) injection 10 mg  10 mg Intravenous Q6HRS Jeremy M Gonda, M.D.   10 mg at 12/23/18 1147   • enoxaparin (LOVENOX) inj 40 mg  40 mg Subcutaneous DAILY Cholo Alan Jr., D.O.   40 mg at 12/23/18 0540   • Influenza Vaccine Quad pf injection 0.5 mL  0.5 mL Intramuscular Once PRN Cholo Alan Jr., D.O.       • glucose 4 g chewable tablet 16 g  16 g Oral Q15 MIN PRN Cholo Alan Jr., D.O.         And   • dextrose 50% (D50W) injection 25 mL  25 mL Intravenous Q15 MIN PRN Cholo Alan Jr., D.O.       • atorvastatin (LIPITOR) tablet 20 mg  20 mg Oral DAILY Adrian Sanchez M.D.   Stopped at 12/22/18 0600   • acetaminophen (TYLENOL) tablet 650 mg  650 mg Oral Q6HRS PRN Cholo Alan Jr., D.O.   650 mg at 12/20/18 0012   • senna-docusate (PERICOLACE or SENOKOT S) 8.6-50 MG per tablet 2 Tab  2 Tab Oral BID Adrian Sanchez M.D.   Stopped at 12/19/18 0600    And   • polyethylene glycol/lytes (MIRALAX) PACKET 1 Packet  1 Packet Oral QDAY PRN Adrian Sanchez M.D.        And   • magnesium hydroxide (MILK OF MAGNESIA) suspension 30 mL  30 mL Oral QDAY PRN Adrian Sanchez M.D.        And   • bisacodyl (DULCOLAX) suppository 10 mg  10 mg Rectal QDAY PRN Adrian Sanchez M.D.       • hydrALAZINE (APRESOLINE) injection 10 mg  10 mg Intravenous Q4HRS PRN Adrian Sanchez M.D.       • ondansetron (ZOFRAN) syringe/vial injection 4 mg  4 mg Intravenous Q4HRS PRN Adrian Sanchez M.D.   4 mg at 12/23/18 1343   • ondansetron (ZOFRAN ODT) dispertab 4 mg  4 mg Oral Q4HRS PRN Adrian Sanchez M.D.           Fluids    Intake/Output Summary (Last 24 hours) at 12/23/18 1840  Last data filed at 12/23/18 1600   Gross per 24 hour   Intake          2226.37 ml   Output              200 ml   Net          2026.37 ml       Laboratory  Recent Labs      12/23/18   1235   [id number]  7.16*   [id number]  13.0*   [id number]  101.4*   [id number]  96.4   ARTHCO3  5*   ARTBE  -22*         Recent Labs      12/22/18   0055  12/23/18   0600  12/23/18   1151   SODIUM  141  133*  137   POTASSIUM  3.3*  3.8  4.2   CHLORIDE  103  100  108   CO2  28  10*  6*   BUN  6*  6*  7*   CREATININE  0.44*  0.57  0.60   MAGNESIUM   --   1.6   --    PHOSPHORUS   --   3.9   --    CALCIUM  8.3*  9.0  8.7     Recent Labs      12/21/18   0231  12/22/18   0055  12/23/18   0600  12/23/18   1151   ALTSGPT  9  12  13   --    ASTSGOT  16  12  8*    --    ALKPHOSPHAT  74  84  101*   --    TBILIRUBIN  0.6  0.6  0.8   --    GLUCOSE  209*  184*  366*  258*     Recent Labs      12/21/18   0231  12/22/18   0055  12/23/18   0600   WBC  4.9  5.5  7.9   NEUTSPOLYS  56.20  62.20  72.50*   LYMPHOCYTES  32.30  26.20  17.70*   MONOCYTES  8.40  9.90  8.80   EOSINOPHILS  2.30  1.10  0.10   BASOPHILS  0.60  0.20  0.40   ASTSGOT  16  12  8*   ALTSGPT  9  12  13   ALKPHOSPHAT  74  84  101*   TBILIRUBIN  0.6  0.6  0.8     Recent Labs      12/21/18   0231  12/22/18 0055  12/23/18   0600   RBC  4.16*  4.18*  4.81   HEMOGLOBIN  12.1  12.3  13.8   HEMATOCRIT  36.2*  35.5*  42.5   PLATELETCT  112*  127*  192       Imaging  X-Ray:  I have personally reviewed the images and compared with prior images.    Assessment/Plan  Pneumonia   Assessment & Plan    Afebrile, no leukocytosis, normal PCT  Reviewed CT, this appears to be more likely pneumonitis  Was started on azithromycin while on the medical floor, I will complete a 5-day course of this for completeness.     Diabetic ketoacidosis (HCC)- (present on admission)   Assessment & Plan    This most likely represents a mixed diabetic ketoacidosis and starvation ketosis given the patient's inability to eat for the last 5 days.    ICU transfer, cardiac monitoring  I will optimize intravascular volume with IVF bolus  DKA protocol with insulin drip at 0.05 units/kg/hr  Every hour Accu-Cheks, every 4 hour BMP, mag, phos  Goal Magnesium: >2, Phosphorus: 2, K >4  Will transition to sliding scale insulin when anion gap <12, CO2 > 17       Intractable nausea and vomiting- (present on admission)   Assessment & Plan    Likely due to her underlying gastroparesis compounded by diabetic ketoacidosis  Continue scheduled IV Reglan, continue Zofran as needed  Add scheduled Compazine and Benadryl     Gastroparesis- (present on admission)   Assessment & Plan    Continue scheduled pepcid, Reglan, as needed Zofran  Add scheduled Compazine and Benadryl      Diabetes type I (CMS-HCC)- (present on admission)   Assessment & Plan    Poorly controlled  Hemoglobin A1c 11.8  Multiple complications of diabetes  Consider diabetes education  RD consult if needed     Leukocytosis- (present on admission)   Assessment & Plan    Reactive, resolved  Monitor     Flank pain- (present on admission)   Assessment & Plan    UA unremarkable  Lipase normal  Renal US with moderate right hydro  CT negative for renal etiology  Improved     Difficult intravenous access- (present on admission)   Assessment & Plan    Midline in place     Dehydration- (present on admission)   Assessment & Plan    Due to diabetic ketoacidosis and urine losses  IVF        VTE:  Lovenox  Ulcer: PPI  Lines: Central Line  Ongoing indication addressed    I have performed a physical exam and reviewed and updated ROS and Plan today (12/23/2018). In review of yesterday's note (12/22/2018), there are no changes except as documented above.     Discussed patient condition and risk of morbidity and/or mortality with RN, Pharmacy and Patient.      Titrating an insulin infusion addressing severe metabolic acidosis. This patient is critically ill, at high risk for decompensation leading to worsening vital organ dysfunction and death without critical care interventions.    The patient remains critically ill.  Critical care time = 32 minutes in directly providing and coordinating critical care and extensive data review.  No time overlap and excludes procedures

## 2018-12-24 NOTE — ASSESSMENT & PLAN NOTE
Afebrile, no leukocytosis, normal PCT  Reviewed CT, this appears to be more likely pneumonitis  Was started on azithromycin while on the medical floor, I will complete a 5-day course of this for completeness.

## 2018-12-25 LAB
AMPHET UR QL SCN: NEGATIVE
ANION GAP SERPL CALC-SCNC: 12 MMOL/L (ref 0–11.9)
ANION GAP SERPL CALC-SCNC: 9 MMOL/L (ref 0–11.9)
BARBITURATES UR QL SCN: NEGATIVE
BASOPHILS # BLD AUTO: 0.4 % (ref 0–1.8)
BASOPHILS # BLD: 0.03 K/UL (ref 0–0.12)
BENZODIAZ UR QL SCN: NEGATIVE
BUN SERPL-MCNC: 4 MG/DL (ref 8–22)
BUN SERPL-MCNC: 5 MG/DL (ref 8–22)
BZE UR QL SCN: NEGATIVE
CALCIUM SERPL-MCNC: 8.6 MG/DL (ref 8.5–10.5)
CALCIUM SERPL-MCNC: 8.6 MG/DL (ref 8.5–10.5)
CANNABINOIDS UR QL SCN: NEGATIVE
CHLORIDE SERPL-SCNC: 111 MMOL/L (ref 96–112)
CHLORIDE SERPL-SCNC: 111 MMOL/L (ref 96–112)
CO2 SERPL-SCNC: 16 MMOL/L (ref 20–33)
CO2 SERPL-SCNC: 20 MMOL/L (ref 20–33)
CREAT SERPL-MCNC: 0.43 MG/DL (ref 0.5–1.4)
CREAT SERPL-MCNC: 0.5 MG/DL (ref 0.5–1.4)
CRP SERPL HS-MCNC: 0.12 MG/DL (ref 0–0.75)
EOSINOPHIL # BLD AUTO: 0.08 K/UL (ref 0–0.51)
EOSINOPHIL NFR BLD: 1.1 % (ref 0–6.9)
ERYTHROCYTE [DISTWIDTH] IN BLOOD BY AUTOMATED COUNT: 41.8 FL (ref 35.9–50)
GLUCOSE BLD-MCNC: 122 MG/DL (ref 65–99)
GLUCOSE BLD-MCNC: 126 MG/DL (ref 65–99)
GLUCOSE BLD-MCNC: 126 MG/DL (ref 65–99)
GLUCOSE BLD-MCNC: 127 MG/DL (ref 65–99)
GLUCOSE BLD-MCNC: 144 MG/DL (ref 65–99)
GLUCOSE BLD-MCNC: 147 MG/DL (ref 65–99)
GLUCOSE BLD-MCNC: 153 MG/DL (ref 65–99)
GLUCOSE BLD-MCNC: 183 MG/DL (ref 65–99)
GLUCOSE BLD-MCNC: 186 MG/DL (ref 65–99)
GLUCOSE BLD-MCNC: 192 MG/DL (ref 65–99)
GLUCOSE BLD-MCNC: 195 MG/DL (ref 65–99)
GLUCOSE BLD-MCNC: 218 MG/DL (ref 65–99)
GLUCOSE BLD-MCNC: 237 MG/DL (ref 65–99)
GLUCOSE BLD-MCNC: 240 MG/DL (ref 65–99)
GLUCOSE SERPL-MCNC: 149 MG/DL (ref 65–99)
GLUCOSE SERPL-MCNC: 224 MG/DL (ref 65–99)
HCT VFR BLD AUTO: 36.8 % (ref 37–47)
HGB BLD-MCNC: 12.3 G/DL (ref 12–16)
IMM GRANULOCYTES # BLD AUTO: 0.02 K/UL (ref 0–0.11)
IMM GRANULOCYTES NFR BLD AUTO: 0.3 % (ref 0–0.9)
LYMPHOCYTES # BLD AUTO: 1.22 K/UL (ref 1–4.8)
LYMPHOCYTES NFR BLD: 17 % (ref 22–41)
MAGNESIUM SERPL-MCNC: 1.8 MG/DL (ref 1.5–2.5)
MCH RBC QN AUTO: 29.2 PG (ref 27–33)
MCHC RBC AUTO-ENTMCNC: 33.4 G/DL (ref 33.6–35)
MCV RBC AUTO: 87.4 FL (ref 81.4–97.8)
METHADONE UR QL SCN: NEGATIVE
MONOCYTES # BLD AUTO: 0.93 K/UL (ref 0–0.85)
MONOCYTES NFR BLD AUTO: 13 % (ref 0–13.4)
NEUTROPHILS # BLD AUTO: 4.89 K/UL (ref 2–7.15)
NEUTROPHILS NFR BLD: 68.2 % (ref 44–72)
NRBC # BLD AUTO: 0 K/UL
NRBC BLD-RTO: 0 /100 WBC
OPIATES UR QL SCN: POSITIVE
OXYCODONE UR QL SCN: NEGATIVE
PCP UR QL SCN: NEGATIVE
PHOSPHATE SERPL-MCNC: 3.1 MG/DL (ref 2.5–4.5)
PLATELET # BLD AUTO: 214 K/UL (ref 164–446)
PMV BLD AUTO: 10.5 FL (ref 9–12.9)
POTASSIUM SERPL-SCNC: 3.3 MMOL/L (ref 3.6–5.5)
POTASSIUM SERPL-SCNC: 3.9 MMOL/L (ref 3.6–5.5)
PREALB SERPL-MCNC: 15 MG/DL (ref 18–38)
PROPOXYPH UR QL SCN: NEGATIVE
RBC # BLD AUTO: 4.21 M/UL (ref 4.2–5.4)
SODIUM SERPL-SCNC: 139 MMOL/L (ref 135–145)
SODIUM SERPL-SCNC: 140 MMOL/L (ref 135–145)
WBC # BLD AUTO: 7.2 K/UL (ref 4.8–10.8)

## 2018-12-25 PROCEDURE — 84134 ASSAY OF PREALBUMIN: CPT

## 2018-12-25 PROCEDURE — A9270 NON-COVERED ITEM OR SERVICE: HCPCS | Performed by: STUDENT IN AN ORGANIZED HEALTH CARE EDUCATION/TRAINING PROGRAM

## 2018-12-25 PROCEDURE — 700102 HCHG RX REV CODE 250 W/ 637 OVERRIDE(OP): Performed by: INTERNAL MEDICINE

## 2018-12-25 PROCEDURE — 85025 COMPLETE CBC W/AUTO DIFF WBC: CPT

## 2018-12-25 PROCEDURE — 700111 HCHG RX REV CODE 636 W/ 250 OVERRIDE (IP): Performed by: STUDENT IN AN ORGANIZED HEALTH CARE EDUCATION/TRAINING PROGRAM

## 2018-12-25 PROCEDURE — 80048 BASIC METABOLIC PNL TOTAL CA: CPT

## 2018-12-25 PROCEDURE — 700111 HCHG RX REV CODE 636 W/ 250 OVERRIDE (IP): Performed by: INTERNAL MEDICINE

## 2018-12-25 PROCEDURE — 84100 ASSAY OF PHOSPHORUS: CPT

## 2018-12-25 PROCEDURE — 82962 GLUCOSE BLOOD TEST: CPT | Mod: 91

## 2018-12-25 PROCEDURE — 700105 HCHG RX REV CODE 258: Performed by: INTERNAL MEDICINE

## 2018-12-25 PROCEDURE — 770022 HCHG ROOM/CARE - ICU (200)

## 2018-12-25 PROCEDURE — 83735 ASSAY OF MAGNESIUM: CPT

## 2018-12-25 PROCEDURE — 80307 DRUG TEST PRSMV CHEM ANLYZR: CPT

## 2018-12-25 PROCEDURE — 99233 SBSQ HOSP IP/OBS HIGH 50: CPT | Performed by: INTERNAL MEDICINE

## 2018-12-25 PROCEDURE — 86140 C-REACTIVE PROTEIN: CPT

## 2018-12-25 PROCEDURE — C9113 INJ PANTOPRAZOLE SODIUM, VIA: HCPCS | Performed by: STUDENT IN AN ORGANIZED HEALTH CARE EDUCATION/TRAINING PROGRAM

## 2018-12-25 PROCEDURE — 700102 HCHG RX REV CODE 250 W/ 637 OVERRIDE(OP): Performed by: STUDENT IN AN ORGANIZED HEALTH CARE EDUCATION/TRAINING PROGRAM

## 2018-12-25 RX ORDER — BISACODYL 10 MG
10 SUPPOSITORY, RECTAL RECTAL ONCE
Status: COMPLETED | OUTPATIENT
Start: 2018-12-25 | End: 2018-12-25

## 2018-12-25 RX ORDER — MAGNESIUM SULFATE 1 G/100ML
1 INJECTION INTRAVENOUS ONCE
Status: COMPLETED | OUTPATIENT
Start: 2018-12-25 | End: 2018-12-25

## 2018-12-25 RX ORDER — KETOROLAC TROMETHAMINE 30 MG/ML
30 INJECTION, SOLUTION INTRAMUSCULAR; INTRAVENOUS EVERY 6 HOURS PRN
Status: DISCONTINUED | OUTPATIENT
Start: 2018-12-25 | End: 2018-12-29 | Stop reason: HOSPADM

## 2018-12-25 RX ORDER — INSULIN GLARGINE 100 [IU]/ML
30 INJECTION, SOLUTION SUBCUTANEOUS ONCE
Status: COMPLETED | OUTPATIENT
Start: 2018-12-25 | End: 2018-12-25

## 2018-12-25 RX ORDER — DEXTROSE MONOHYDRATE 25 G/50ML
25 INJECTION, SOLUTION INTRAVENOUS
Status: DISCONTINUED | OUTPATIENT
Start: 2018-12-25 | End: 2018-12-26

## 2018-12-25 RX ADMIN — ONDANSETRON 4 MG: 2 INJECTION INTRAMUSCULAR; INTRAVENOUS at 20:58

## 2018-12-25 RX ADMIN — KETOROLAC TROMETHAMINE 30 MG: 30 INJECTION, SOLUTION INTRAMUSCULAR at 19:14

## 2018-12-25 RX ADMIN — INSULIN GLARGINE 30 UNITS: 100 INJECTION, SOLUTION SUBCUTANEOUS at 11:22

## 2018-12-25 RX ADMIN — METOCLOPRAMIDE 10 MG: 5 INJECTION, SOLUTION INTRAMUSCULAR; INTRAVENOUS at 05:39

## 2018-12-25 RX ADMIN — ONDANSETRON 4 MG: 2 INJECTION INTRAMUSCULAR; INTRAVENOUS at 09:15

## 2018-12-25 RX ADMIN — METOCLOPRAMIDE 10 MG: 5 INJECTION, SOLUTION INTRAMUSCULAR; INTRAVENOUS at 12:23

## 2018-12-25 RX ADMIN — METOCLOPRAMIDE 10 MG: 5 INJECTION, SOLUTION INTRAMUSCULAR; INTRAVENOUS at 17:28

## 2018-12-25 RX ADMIN — KETOROLAC TROMETHAMINE 30 MG: 30 INJECTION, SOLUTION INTRAMUSCULAR at 12:22

## 2018-12-25 RX ADMIN — POTASSIUM BICARBONATE 25 MEQ: 25 TABLET, EFFERVESCENT ORAL at 10:14

## 2018-12-25 RX ADMIN — PROCHLORPERAZINE EDISYLATE 10 MG: 5 INJECTION INTRAMUSCULAR; INTRAVENOUS at 05:39

## 2018-12-25 RX ADMIN — DEXTROSE AND SODIUM CHLORIDE: 5; 450 INJECTION, SOLUTION INTRAVENOUS at 01:57

## 2018-12-25 RX ADMIN — MORPHINE SULFATE 2 MG: 10 INJECTION INTRAVENOUS at 00:50

## 2018-12-25 RX ADMIN — BISACODYL 10 MG: 10 SUPPOSITORY RECTAL at 11:23

## 2018-12-25 RX ADMIN — ENOXAPARIN SODIUM 40 MG: 100 INJECTION SUBCUTANEOUS at 05:39

## 2018-12-25 RX ADMIN — PANTOPRAZOLE SODIUM 40 MG: 40 INJECTION, POWDER, FOR SOLUTION INTRAVENOUS at 05:39

## 2018-12-25 RX ADMIN — MAGNESIUM CITRATE 296 ML: 1.75 LIQUID ORAL at 11:23

## 2018-12-25 RX ADMIN — SODIUM CHLORIDE 2.5 UNITS/HR: 9 INJECTION, SOLUTION INTRAVENOUS at 07:12

## 2018-12-25 RX ADMIN — MORPHINE SULFATE 2 MG: 10 INJECTION INTRAVENOUS at 09:15

## 2018-12-25 RX ADMIN — MAGNESIUM SULFATE IN DEXTROSE 1 G: 10 INJECTION, SOLUTION INTRAVENOUS at 10:14

## 2018-12-25 RX ADMIN — INSULIN HUMAN 4 UNITS: 100 INJECTION, SOLUTION PARENTERAL at 17:26

## 2018-12-25 RX ADMIN — PROCHLORPERAZINE EDISYLATE 10 MG: 5 INJECTION INTRAMUSCULAR; INTRAVENOUS at 12:25

## 2018-12-25 RX ADMIN — PROCHLORPERAZINE EDISYLATE 10 MG: 5 INJECTION INTRAMUSCULAR; INTRAVENOUS at 17:28

## 2018-12-25 ASSESSMENT — ENCOUNTER SYMPTOMS
INSOMNIA: 0
SORE THROAT: 0
PALPITATIONS: 0
DIARRHEA: 0
CONSTIPATION: 1
MYALGIAS: 0
SENSORY CHANGE: 0
SPUTUM PRODUCTION: 0
TINGLING: 0
DOUBLE VISION: 0
BACK PAIN: 0
NERVOUS/ANXIOUS: 0
FEVER: 0
DIAPHORESIS: 0
HEADACHES: 0
COUGH: 0
CHILLS: 0
NAUSEA: 1
VOMITING: 1
BLURRED VISION: 0
ABDOMINAL PAIN: 1
DEPRESSION: 0

## 2018-12-25 ASSESSMENT — PAIN SCALES - GENERAL
PAINLEVEL_OUTOF10: 6
PAINLEVEL_OUTOF10: 5
PAINLEVEL_OUTOF10: 6
PAINLEVEL_OUTOF10: 8
PAINLEVEL_OUTOF10: 8
PAINLEVEL_OUTOF10: 6
PAINLEVEL_OUTOF10: 9
PAINLEVEL_OUTOF10: 9

## 2018-12-25 NOTE — CARE PLAN
Problem: Venous Thromboembolism (VTW)/Deep Vein Thrombosis (DVT) Prevention:  Goal: Patient will participate in Venous Thrombosis (VTE)/Deep Vein Thrombosis (DVT)Prevention Measures  Education provided regarding SCDs. Pt refusing. Lovenox per MAR.    Problem: Pain Management  Goal: Pain level will decrease to patient's comfort goal  Pt complaining of continuous left flank and abdominal pain. Medications and comfort measures provided. MD aware.

## 2018-12-25 NOTE — PROGRESS NOTES
[facility] Resident (Dr. Cevallos) updated on blood sugar trends as well as AM BMP results. MD to discuss with day team and update plan of care if necessary. No orders at this time.

## 2018-12-25 NOTE — PROGRESS NOTES
[facility] Resident (Dr. Sanchez) notified Primary and Charge RN unable to establish second IV site using palation or ultrasoun technique. Continuous IV infusions reviewed with Yvon in Pharmacy per pharmacist it is ok to run insulin and dextrose maintenance fluid together. MD will change Zithromax IVPB to PO and ER RN has been called to attempt to establish second site.

## 2018-12-25 NOTE — PROGRESS NOTES
Internal Medicine Interval Note  Note Author: Hemant Souza M.D.     Name Alis Sparks     1990   Age/Sex 28 y.o. female   MRN [id number]   Code Status FULL     After 5PM or if no immediate response to page, please call for cross-coverage  Attending/Team: Dr. Lincoln/José Manuel See Patient List for primary contact information  Call [phone] to page    1st Call -  Day Sr. Resident (R2/R3):   Dr. Souza 2nd Call         Reason for interval visit  (Principal Problem)   Nausea, vomiting    HPI (per Dr. Sanchez):              Pt is a 29 yo F presenting with severe N/V this morning. Pt has a PMH of DM1 (most recent Ha1c on 11/10 was 11.8; hx of peripheral neuropathy, gastroparesis, autonomic dysfunction, and nephropathy), inappropriate sinus tach, and medical noncompliance. Pt developed nausea ~2 weeks ago, however she has a hx of gastroparesis and was resistant to see a healthcare provider. Sometime in the recent past mother notes daughters insurance no longer covered her rapid acting insulin glulisine. Mother notes a near yearly history of DKA admissions since her diagnosis at age 17. Her symptoms culminated the morning of presentation with severe N/V when her mother prompted her to present to the ED.              In the ED pt was noted to have sugars in 400s and slight leukocytosis. Venous ABG showed pH 6.9. Beta hydroxybutyric acid was >8. Bicarb was <5 and AG was 25. Pt was given LR bolus and started on insulin drip. Pt was then admitted to the ICU for further mgmt of her DKA.      Interval Problem Daily Status Update  (24 hours, problem oriented, brief subjective history, new lab/imaging data pertinent to that problem)     Cortrack placed with initiation of tube feeds.   Still vomiting intermittently but stable not worse.    Fluids changed from D10 1/2NS to D5 1/2 at 75 mL/hr.     NEURO: Stable nausea and vomiting overnight following Cortrack placement.  Scheduled metoclopramide and  diphenhydramine.    Diphenhydramine discontinued due to anticholinergic effects on 12/24.  Zofran and Compazine for breakthrough.  CVS: Persistent tachycardia HR 90-120s      -150s/70-90s.  PULM:  RR  14-27       SpO2 94% on room air  GI:  NPO except medications. Changed to clear liquid diet.  Cortrack placed 12/25. TF at 10 mL/hr.  Magnesium citrate and dulcolax suppository due to no BM since admission.  Passing gas.  Morphine changed to Toradol.  :  I/O 3587/2130 (+1.4 L)  Anion gap stable 14>>9>8>12.  CO2 8>>15>18>16.  Potassium 3.9, Mg 1.8.  Beta hydroxybutyrate improved to 1.6  IVF D5 1/2 NS at 75 mL/hr.  HEME/ID:  Tmax 99  Tc 97.7, WBC 7.2, Hemoglobin 12.3.  ENDO:  Blood glucose  116-240.  Continue insulin drip per protocol at 8 units/hr. Continue until anion gap <12 and CO2 >17.  Plan to transition to subcutaneous insulin once tolerating oral intake.  Glargine 30 units once then reassess.  DVT:  Enoxaparin.      Review of Systems   Constitutional: Negative for chills, diaphoresis and fever.   HENT: Negative for congestion, ear pain and sore throat.    Eyes: Negative for blurred vision and double vision.   Respiratory: Negative for cough and sputum production.    Cardiovascular: Negative for chest pain, palpitations and leg swelling.   Gastrointestinal: Positive for abdominal pain (diffuse), constipation (no bowel movement since admission), nausea and vomiting. Negative for diarrhea.   Genitourinary: Negative for dysuria and urgency.   Musculoskeletal: Negative for back pain and myalgias.   Skin: Negative for itching and rash.   Neurological: Negative for tingling, sensory change and headaches.   Psychiatric/Behavioral: Negative for depression. The patient is not nervous/anxious and does not have insomnia.        Disposition/Barriers to discharge:   None    Consultants/Specialty  Diabetes Health Educator    PCP: Navi Huber M.D.      Quality Measures  Quality-Core Measures   Reviewed  items::  Labs reviewed, Medications reviewed, Radiology images reviewed and EKG reviewed  Pitt catheter::  No Pitt  DVT prophylaxis pharmacological::  Enoxaparin (Lovenox)  Ulcer Prophylaxis::  Yes  Antibiotics:  Treating active infection/contamination beyond 24 hours perioperative coverage          Physical Exam       Vitals:    12/25/18 0300 12/25/18 0400 12/25/18 0500 12/25/18 0600   BP:       Pulse: (!) 103 (!) 108 98 (!) 112   Resp: 14 16 15 17   Temp:  37.2 °C (99 °F)  36.6 °C (97.8 °F)   TempSrc:  Temporal  Temporal   SpO2: 95% 95% 95% 97%   Weight:       Height:         Body mass index is 30.38 kg/m². Weight: 82.8 kg (182 lb 8.7 oz)  Oxygen Therapy:  Pulse Oximetry: 97 %, O2 Delivery: None (Room Air)    Physical Exam   Constitutional: She is oriented to person, place, and time. She appears distressed (mild due to nausea and vomiting).   HENT:   Head: Normocephalic and atraumatic.   Right Ear: External ear normal.   Left Ear: External ear normal.   Mouth/Throat: Oropharynx is clear and moist. No oropharyngeal exudate.   Eyes: Pupils are equal, round, and reactive to light. Right eye exhibits no discharge. Left eye exhibits no discharge. No scleral icterus.   Neck: Normal range of motion. Neck supple. No JVD present. No tracheal deviation present. No thyromegaly present.   Cardiovascular: Normal rate, regular rhythm, normal heart sounds and intact distal pulses.  Exam reveals no gallop and no friction rub.    No murmur heard.  Pulmonary/Chest: Effort normal and breath sounds normal. No respiratory distress. She has no wheezes. She has no rales.   Abdominal: Soft. Bowel sounds are normal. She exhibits no distension and no mass. There is tenderness (diffuse abdominal pain to direct palpation). There is no rebound and no guarding.   Musculoskeletal: Normal range of motion. She exhibits no edema, tenderness or deformity.   Neurological: She is alert and oriented to person, place, and time. No cranial nerve  deficit. She exhibits normal muscle tone.   Skin: Skin is warm. No rash noted. She is not diaphoretic. No erythema. No pallor.   Psychiatric: Mood and affect normal.   Nursing note and vitals reviewed.            Assessment/Plan     * Intractable nausea and vomiting- (present on admission)   Assessment & Plan    Persistent nausea and vomiting.  Etiology unclear.  Improved following core check placement and initiation of tube feeds at 10 mL/h.  Unclear whether this is from gastroparesis.  She had a normal gastric emptying scan in September 18, 2014.  DKA appears to have mostly resolved.  Diphenhydramine was discontinued 12/24 due to concern for anticholinergic effects and potential for delirium.    Abdominal x-ray from 12/22 shows copious stool within the bowels.  No bowel movement since admission.  Suspect nausea and vomiting could be due to severe constipation.  Mother reports having had H. pylori.    Plan:  -Continue scheduled metoclopramide 10 mg IV every 6 hours.  -Continue Zofran and Compazine for breakthrough nausea as needed.  -Start clear liquid diet.  -Advance tube feeds as tolerated.  -Repeat gastric emptying study when able to tolerate oral intake.  -Dulcolax suppository with digital rectal stimulation.  -Magnesium citrate by mouth once.  -Check H. pylori stool antigen.  -Check transglutaminase antibody to assess for celiac disease.     Pneumonia- (present on admission)   Assessment & Plan    More likely pneumonitis. Symptoms improving.  - h/o productive cough with sputum production  - CT shows small bilateral pleural effusions and patchy bilateral lower lobe airspace opacities may be infectious/inflammatory  - Normal WBC, afebrile    Plan:  - Continue course of azithromycin IV started 12/21.  - Continue guaifenesin for cough.     Diabetic ketoacidosis (HCC)- (present on admission)   Assessment & Plan    Per mother, patient has been compliant but was not feeling well.  Suspect possible viral infection,  which may have precipitated.  Improving. Likely mixed with starvation ketosis since has not been eating.  Anion gap stable 14>>9>8>12.  CO2 8>>15>18>16.  Potassium 3.9, Mg 1.8.  Beta hydroxybutyrate 1.6    Plan:   -Transition to glargine insulin plus sliding scale. Glargine insulin 30 units now.  -Continue IVF D5 1/2 NS at 75 mL/hr.     Hypokalemia- (present on admission)   Assessment & Plan    Improving.  Potassium 3.9.  Hypokalemia likely secondary to insulin drip.  Potassium goal >4.0.  Magnesium 1.8, goal >2.0.  Phosphorus goal >3.0.  Plan:  -Continue to monitor and replace per potassium replacement protocol.      Gastroparesis- (present on admission)   Assessment & Plan    Assumed patient's persistent nausea and vomiting secondary to diabetic gastroparesis due to uncontrolled diabetes.  However, gastric emptying scan performed on September 18, 2014 was normal.  Uncertain persistent nausea vomiting is due to diabetic gastroparesis but more due to narcotic induced constipation.  Patient has not had a bowel movement since admission.  Abdominal x-ray shows copious stool within the bowel.  Plan:  -Work on bowel movement as per above today.  -Continue scheduled Reglan 10 mg IV every 6 hours.  -Repeat gastric emptying study when patient tolerating oral intake.     Diabetes type I (CMS-[facility])- (present on admission)   Assessment & Plan    - Poorly controlled, HbA1c 11.8. Mother reports good compliance.  - Mother states patient was taking insulin but was feeling ill.  Per records, also may have possibly run out of rapid acting insulin due to insurance problems.  - Multiple previous DKA admissions.  Plan:  - Diabetes education when on floor.  - Continue insulin drip per protocol as per above.  Will try to transition to sliding scale and subcutaneous insulin the afternoon.  - Hypoglycemia protocol.     Dehydration- (present on admission)   Assessment & Plan    Improved.  Secondary due to diabetic ketoacidosis and osmotic  diuresis.  Plan:  -Continue tube feeds and IV fluids as per above.

## 2018-12-25 NOTE — PROGRESS NOTES
Assumed care. Discussed poc, safety, call system, medications and unit routine. All questions/concerns addressed. All needs met. No distress. Family at bedside.

## 2018-12-26 PROBLEM — D64.9 NORMOCYTIC ANEMIA: Status: ACTIVE | Noted: 2018-12-26

## 2018-12-26 LAB
ANION GAP SERPL CALC-SCNC: 6 MMOL/L (ref 0–11.9)
BASOPHILS # BLD AUTO: 0.7 % (ref 0–1.8)
BASOPHILS # BLD: 0.03 K/UL (ref 0–0.12)
BUN SERPL-MCNC: 7 MG/DL (ref 8–22)
CALCIUM SERPL-MCNC: 8.3 MG/DL (ref 8.5–10.5)
CHLORIDE SERPL-SCNC: 109 MMOL/L (ref 96–112)
CO2 SERPL-SCNC: 23 MMOL/L (ref 20–33)
CREAT SERPL-MCNC: 0.43 MG/DL (ref 0.5–1.4)
EOSINOPHIL # BLD AUTO: 0.12 K/UL (ref 0–0.51)
EOSINOPHIL NFR BLD: 2.8 % (ref 0–6.9)
ERYTHROCYTE [DISTWIDTH] IN BLOOD BY AUTOMATED COUNT: 40.4 FL (ref 35.9–50)
GLUCOSE BLD-MCNC: 143 MG/DL (ref 65–99)
GLUCOSE BLD-MCNC: 170 MG/DL (ref 65–99)
GLUCOSE BLD-MCNC: 179 MG/DL (ref 65–99)
GLUCOSE BLD-MCNC: 209 MG/DL (ref 65–99)
GLUCOSE BLD-MCNC: 220 MG/DL (ref 65–99)
GLUCOSE BLD-MCNC: 225 MG/DL (ref 65–99)
GLUCOSE SERPL-MCNC: 229 MG/DL (ref 65–99)
HCT VFR BLD AUTO: 32.9 % (ref 37–47)
HGB BLD-MCNC: 11.5 G/DL (ref 12–16)
IMM GRANULOCYTES # BLD AUTO: 0.03 K/UL (ref 0–0.11)
IMM GRANULOCYTES NFR BLD AUTO: 0.7 % (ref 0–0.9)
LYMPHOCYTES # BLD AUTO: 1.73 K/UL (ref 1–4.8)
LYMPHOCYTES NFR BLD: 39.7 % (ref 22–41)
MAGNESIUM SERPL-MCNC: 1.9 MG/DL (ref 1.5–2.5)
MAGNESIUM SERPL-MCNC: 2.4 MG/DL (ref 1.5–2.5)
MCH RBC QN AUTO: 29.9 PG (ref 27–33)
MCHC RBC AUTO-ENTMCNC: 35 G/DL (ref 33.6–35)
MCV RBC AUTO: 85.5 FL (ref 81.4–97.8)
MONOCYTES # BLD AUTO: 0.63 K/UL (ref 0–0.85)
MONOCYTES NFR BLD AUTO: 14.4 % (ref 0–13.4)
NEUTROPHILS # BLD AUTO: 1.82 K/UL (ref 2–7.15)
NEUTROPHILS NFR BLD: 41.7 % (ref 44–72)
NRBC # BLD AUTO: 0 K/UL
NRBC BLD-RTO: 0 /100 WBC
PLATELET # BLD AUTO: 172 K/UL (ref 164–446)
PMV BLD AUTO: 10.3 FL (ref 9–12.9)
POTASSIUM SERPL-SCNC: 3.2 MMOL/L (ref 3.6–5.5)
RBC # BLD AUTO: 3.85 M/UL (ref 4.2–5.4)
SODIUM SERPL-SCNC: 138 MMOL/L (ref 135–145)
WBC # BLD AUTO: 4.4 K/UL (ref 4.8–10.8)

## 2018-12-26 PROCEDURE — A9270 NON-COVERED ITEM OR SERVICE: HCPCS | Performed by: STUDENT IN AN ORGANIZED HEALTH CARE EDUCATION/TRAINING PROGRAM

## 2018-12-26 PROCEDURE — C9113 INJ PANTOPRAZOLE SODIUM, VIA: HCPCS | Performed by: STUDENT IN AN ORGANIZED HEALTH CARE EDUCATION/TRAINING PROGRAM

## 2018-12-26 PROCEDURE — A9270 NON-COVERED ITEM OR SERVICE: HCPCS | Performed by: INTERNAL MEDICINE

## 2018-12-26 PROCEDURE — 700111 HCHG RX REV CODE 636 W/ 250 OVERRIDE (IP): Performed by: INTERNAL MEDICINE

## 2018-12-26 PROCEDURE — 700111 HCHG RX REV CODE 636 W/ 250 OVERRIDE (IP): Performed by: STUDENT IN AN ORGANIZED HEALTH CARE EDUCATION/TRAINING PROGRAM

## 2018-12-26 PROCEDURE — 83735 ASSAY OF MAGNESIUM: CPT

## 2018-12-26 PROCEDURE — 90686 IIV4 VACC NO PRSV 0.5 ML IM: CPT | Performed by: INTERNAL MEDICINE

## 2018-12-26 PROCEDURE — 700105 HCHG RX REV CODE 258: Performed by: STUDENT IN AN ORGANIZED HEALTH CARE EDUCATION/TRAINING PROGRAM

## 2018-12-26 PROCEDURE — 82784 ASSAY IGA/IGD/IGG/IGM EACH: CPT

## 2018-12-26 PROCEDURE — 80048 BASIC METABOLIC PNL TOTAL CA: CPT

## 2018-12-26 PROCEDURE — 90471 IMMUNIZATION ADMIN: CPT

## 2018-12-26 PROCEDURE — 36415 COLL VENOUS BLD VENIPUNCTURE: CPT

## 2018-12-26 PROCEDURE — 83516 IMMUNOASSAY NONANTIBODY: CPT

## 2018-12-26 PROCEDURE — 3E02340 INTRODUCTION OF INFLUENZA VACCINE INTO MUSCLE, PERCUTANEOUS APPROACH: ICD-10-PCS | Performed by: INTERNAL MEDICINE

## 2018-12-26 PROCEDURE — 700102 HCHG RX REV CODE 250 W/ 637 OVERRIDE(OP): Performed by: INTERNAL MEDICINE

## 2018-12-26 PROCEDURE — 85025 COMPLETE CBC W/AUTO DIFF WBC: CPT

## 2018-12-26 PROCEDURE — 700102 HCHG RX REV CODE 250 W/ 637 OVERRIDE(OP): Performed by: STUDENT IN AN ORGANIZED HEALTH CARE EDUCATION/TRAINING PROGRAM

## 2018-12-26 PROCEDURE — 82962 GLUCOSE BLOOD TEST: CPT

## 2018-12-26 PROCEDURE — 770006 HCHG ROOM/CARE - MED/SURG/GYN SEMI*

## 2018-12-26 PROCEDURE — 99233 SBSQ HOSP IP/OBS HIGH 50: CPT | Performed by: INTERNAL MEDICINE

## 2018-12-26 PROCEDURE — 700105 HCHG RX REV CODE 258: Performed by: INTERNAL MEDICINE

## 2018-12-26 RX ORDER — HYDRALAZINE HYDROCHLORIDE 20 MG/ML
10 INJECTION INTRAMUSCULAR; INTRAVENOUS EVERY 6 HOURS PRN
Status: DISCONTINUED | OUTPATIENT
Start: 2018-12-26 | End: 2018-12-29 | Stop reason: HOSPADM

## 2018-12-26 RX ORDER — DEXTROSE MONOHYDRATE 25 G/50ML
25 INJECTION, SOLUTION INTRAVENOUS
Status: DISCONTINUED | OUTPATIENT
Start: 2018-12-26 | End: 2018-12-29 | Stop reason: HOSPADM

## 2018-12-26 RX ORDER — POLYETHYLENE GLYCOL 3350 17 G/17G
1 POWDER, FOR SOLUTION ORAL DAILY
Status: DISCONTINUED | OUTPATIENT
Start: 2018-12-26 | End: 2018-12-29 | Stop reason: HOSPADM

## 2018-12-26 RX ORDER — POTASSIUM CHLORIDE 20 MEQ/1
40 TABLET, EXTENDED RELEASE ORAL EVERY 4 HOURS
Status: DISCONTINUED | OUTPATIENT
Start: 2018-12-26 | End: 2018-12-26

## 2018-12-26 RX ORDER — METOCLOPRAMIDE 10 MG/1
10 TABLET ORAL EVERY 6 HOURS
Status: DISCONTINUED | OUTPATIENT
Start: 2018-12-26 | End: 2018-12-29 | Stop reason: HOSPADM

## 2018-12-26 RX ORDER — MAGNESIUM SULFATE HEPTAHYDRATE 40 MG/ML
2 INJECTION, SOLUTION INTRAVENOUS ONCE
Status: COMPLETED | OUTPATIENT
Start: 2018-12-26 | End: 2018-12-26

## 2018-12-26 RX ORDER — MAGNESIUM SULFATE HEPTAHYDRATE 40 MG/ML
2 INJECTION, SOLUTION INTRAVENOUS ONCE
Status: DISCONTINUED | OUTPATIENT
Start: 2018-12-26 | End: 2018-12-26

## 2018-12-26 RX ORDER — INSULIN GLARGINE 100 [IU]/ML
30 INJECTION, SOLUTION SUBCUTANEOUS 2 TIMES DAILY
Status: DISCONTINUED | OUTPATIENT
Start: 2018-12-26 | End: 2018-12-26

## 2018-12-26 RX ORDER — OMEPRAZOLE 20 MG/1
20 CAPSULE, DELAYED RELEASE ORAL DAILY
Status: DISCONTINUED | OUTPATIENT
Start: 2018-12-27 | End: 2018-12-29 | Stop reason: HOSPADM

## 2018-12-26 RX ORDER — INSULIN GLARGINE 100 [IU]/ML
30 INJECTION, SOLUTION SUBCUTANEOUS 2 TIMES DAILY
Status: DISCONTINUED | OUTPATIENT
Start: 2018-12-26 | End: 2018-12-29 | Stop reason: HOSPADM

## 2018-12-26 RX ADMIN — DEXTROSE AND SODIUM CHLORIDE: 5; 450 INJECTION, SOLUTION INTRAVENOUS at 19:31

## 2018-12-26 RX ADMIN — ATORVASTATIN CALCIUM 20 MG: 20 TABLET, FILM COATED ORAL at 05:35

## 2018-12-26 RX ADMIN — METOCLOPRAMIDE 10 MG: 10 TABLET ORAL at 22:56

## 2018-12-26 RX ADMIN — PROCHLORPERAZINE EDISYLATE 10 MG: 5 INJECTION INTRAMUSCULAR; INTRAVENOUS at 05:39

## 2018-12-26 RX ADMIN — METOCLOPRAMIDE 10 MG: 5 INJECTION, SOLUTION INTRAMUSCULAR; INTRAVENOUS at 00:55

## 2018-12-26 RX ADMIN — INSULIN GLARGINE 30 UNITS: 100 INJECTION, SOLUTION SUBCUTANEOUS at 19:36

## 2018-12-26 RX ADMIN — POLYETHYLENE GLYCOL 3350 1 PACKET: 17 POWDER, FOR SOLUTION ORAL at 09:24

## 2018-12-26 RX ADMIN — METOCLOPRAMIDE 10 MG: 5 INJECTION, SOLUTION INTRAMUSCULAR; INTRAVENOUS at 05:35

## 2018-12-26 RX ADMIN — ENOXAPARIN SODIUM 40 MG: 100 INJECTION SUBCUTANEOUS at 05:33

## 2018-12-26 RX ADMIN — GUAIFENESIN 600 MG: 600 TABLET, EXTENDED RELEASE ORAL at 05:35

## 2018-12-26 RX ADMIN — INSULIN GLARGINE 30 UNITS: 100 INJECTION, SOLUTION SUBCUTANEOUS at 09:21

## 2018-12-26 RX ADMIN — POTASSIUM BICARBONATE 25 MEQ: 25 TABLET, EFFERVESCENT ORAL at 19:31

## 2018-12-26 RX ADMIN — POTASSIUM BICARBONATE 25 MEQ: 25 TABLET, EFFERVESCENT ORAL at 15:04

## 2018-12-26 RX ADMIN — INFLUENZA A VIRUS A/MICHIGAN/45/2015 X-275 (H1N1) ANTIGEN (FORMALDEHYDE INACTIVATED), INFLUENZA A VIRUS A/SINGAPORE/INFIMH-16-0019/2016 IVR-186 (H3N2) ANTIGEN (FORMALDEHYDE INACTIVATED), INFLUENZA B VIRUS B/PHUKET/3073/2013 ANTIGEN (FORMALDEHYDE INACTIVATED), AND INFLUENZA B VIRUS B/MARYLAND/15/2016 BX-69A ANTIGEN (FORMALDEHYDE INACTIVATED) 0.5 ML: 15; 15; 15; 15 INJECTION, SUSPENSION INTRAMUSCULAR at 09:51

## 2018-12-26 RX ADMIN — INSULIN HUMAN 4 UNITS: 100 INJECTION, SOLUTION PARENTERAL at 01:14

## 2018-12-26 RX ADMIN — KETOROLAC TROMETHAMINE 30 MG: 30 INJECTION, SOLUTION INTRAMUSCULAR at 05:30

## 2018-12-26 RX ADMIN — METOCLOPRAMIDE 10 MG: 5 INJECTION, SOLUTION INTRAMUSCULAR; INTRAVENOUS at 11:12

## 2018-12-26 RX ADMIN — DEXTROSE AND SODIUM CHLORIDE: 5; 450 INJECTION, SOLUTION INTRAVENOUS at 04:29

## 2018-12-26 RX ADMIN — PROCHLORPERAZINE EDISYLATE 10 MG: 5 INJECTION INTRAMUSCULAR; INTRAVENOUS at 00:55

## 2018-12-26 RX ADMIN — GUAIFENESIN 600 MG: 600 TABLET, EXTENDED RELEASE ORAL at 17:32

## 2018-12-26 RX ADMIN — POTASSIUM BICARBONATE 25 MEQ: 25 TABLET, EFFERVESCENT ORAL at 09:51

## 2018-12-26 RX ADMIN — MAGNESIUM SULFATE IN WATER 2 G: 40 INJECTION, SOLUTION INTRAVENOUS at 09:51

## 2018-12-26 RX ADMIN — PANTOPRAZOLE SODIUM 40 MG: 40 INJECTION, POWDER, FOR SOLUTION INTRAVENOUS at 05:34

## 2018-12-26 RX ADMIN — POTASSIUM BICARBONATE 25 MEQ: 25 TABLET, EFFERVESCENT ORAL at 22:56

## 2018-12-26 RX ADMIN — KETOROLAC TROMETHAMINE 30 MG: 30 INJECTION, SOLUTION INTRAMUSCULAR at 17:39

## 2018-12-26 RX ADMIN — INSULIN HUMAN 4 UNITS: 100 INJECTION, SOLUTION PARENTERAL at 06:24

## 2018-12-26 RX ADMIN — METOCLOPRAMIDE 10 MG: 10 TABLET ORAL at 17:32

## 2018-12-26 RX ADMIN — STANDARDIZED SENNA CONCENTRATE AND DOCUSATE SODIUM 2 TABLET: 8.6; 5 TABLET, FILM COATED ORAL at 17:32

## 2018-12-26 RX ADMIN — ONDANSETRON 4 MG: 2 INJECTION INTRAMUSCULAR; INTRAVENOUS at 09:21

## 2018-12-26 ASSESSMENT — PAIN SCALES - GENERAL
PAINLEVEL_OUTOF10: 7
PAINLEVEL_OUTOF10: 4
PAINLEVEL_OUTOF10: 9
PAINLEVEL_OUTOF10: 5
PAINLEVEL_OUTOF10: 7
PAINLEVEL_OUTOF10: 5
PAINLEVEL_OUTOF10: 0
PAINLEVEL_OUTOF10: 5
PAINLEVEL_OUTOF10: 6
PAINLEVEL_OUTOF10: 5
PAINLEVEL_OUTOF10: 5

## 2018-12-26 ASSESSMENT — ENCOUNTER SYMPTOMS
NERVOUS/ANXIOUS: 0
CHILLS: 0
TINGLING: 0
BACK PAIN: 0
BLURRED VISION: 0
CONSTIPATION: 0
INSOMNIA: 0
VOMITING: 1
COUGH: 0
SPUTUM PRODUCTION: 0
HEADACHES: 0
PALPITATIONS: 0
DEPRESSION: 0
MYALGIAS: 0
DOUBLE VISION: 0
NAUSEA: 1
DIAPHORESIS: 0
DIARRHEA: 0
SORE THROAT: 0
ABDOMINAL PAIN: 1
FEVER: 0

## 2018-12-26 NOTE — PROGRESS NOTES
2 RN skin check down with Carroll RN. Scratch noted to left upper arm. No other skin break down noted.

## 2018-12-26 NOTE — PROGRESS NOTES
Pt arrived to the unit from [facility] ICU with the transporter via gurney.   Pt is AO x 4  On RA  Right arm IV running magnesium sulfate  Left arm midline infusing D2 1/2 NS at 50 ml/hr  Pt also has a cortrak in right nare. Diabetic source running at 40 mL/hr.     Safety precautions in place. Call light within reach. Bed in low and locked position. Hourly rounding in place. Updated on plan of care. Questions answered.

## 2018-12-26 NOTE — PROGRESS NOTES
Dr Souza notified that pt vomited 300ml of clear/orange/pink emesis just after giving potassium & miralax down her cortrak.  No new orders.

## 2018-12-26 NOTE — PROGRESS NOTES
2 RN skin check down with Leandra CHAVIRA. Scratch noted to left upper arm. No other skin break down noted.

## 2018-12-26 NOTE — CARE PLAN
Problem: Nutritional:  Goal: Nutrition support tolerated and meeting greater than 85% of estimated needs  Outcome: PROGRESSING SLOWER THAN EXPECTED  TF advancing slowly + PO diet in place with poor po intake   Intervention: Initiate TF per protocol  RD following

## 2018-12-26 NOTE — CARE PLAN
Problem: Bowel/Gastric:  Goal: Normal bowel function is maintained or improved  Outcome: PROGRESSING SLOWER THAN EXPECTED  Patient experiencing continued nausea and intermittent vomiting, despite scheduled and PRN anti-emetics.     Problem: Pain Management  Goal: Pain level will decrease to patient's comfort goal  Outcome: PROGRESSING AS EXPECTED  Patient educated on pain rating scale and verbal descriptors of pain. Patient's pain reduced with repositioning, rest, and administration of prescribed Toradol.

## 2018-12-26 NOTE — PROGRESS NOTES
Internal Medicine Interval Note  Note Author: Hemant Souza M.D.     Name Alis Sparks     1990   Age/Sex 28 y.o. female   MRN [id number]   Code Status FULL     After 5PM or if no immediate response to page, please call for cross-coverage  Attending/Team: Dr. Lincoln/José Manuel See Patient List for primary contact information  Call [phone] to page    1st Call -  Day Sr. Resident (R2/R3):   Dr. Souza 2nd Call         Reason for interval visit  (Principal Problem)   Nausea, vomiting    HPI (per Dr. Sanchez):              Pt is a 27 yo F presenting with severe N/V this morning. Pt has a PMH of DM1 (most recent Ha1c on 11/10 was 11.8; hx of peripheral neuropathy, gastroparesis, autonomic dysfunction, and nephropathy), inappropriate sinus tach, and medical noncompliance. Pt developed nausea ~2 weeks ago, however she has a hx of gastroparesis and was resistant to see a healthcare provider. Sometime in the recent past mother notes daughters insurance no longer covered her rapid acting insulin glulisine. Mother notes a near yearly history of DKA admissions since her diagnosis at age 17. Her symptoms culminated the morning of presentation with severe N/V when her mother prompted her to present to the ED.              In the ED pt was noted to have sugars in 400s and slight leukocytosis. Venous ABG showed pH 6.9. Beta hydroxybutyric acid was >8. Bicarb was <5 and AG was 25. Pt was given LR bolus and started on insulin drip. Pt was then admitted to the ICU for further mgmt of her DKA.      Following transfer to the floor on 2018, patient was transferred back to the ICU for increasing anion gap and concern for diabetic ketoacidosis.      Interval Problem Daily Status Update  (24 hours, problem oriented, brief subjective history, new lab/imaging data pertinent to that problem)     Tube feeds increased to 20 mL/h yesterday.  Tolerating clear liquid diet.  Nausea and vomiting improved (40% per  patient) following multiple bowel movements with magnesium citrate and Dulcolax suppository.  Polyethylene glycol daily ordered for constipation prevention maintenance.  Continuing on D5 one half NS at 75 mL/h.      NEURO: Nausea and vomiting improved 40% per patient following multiple bowel movements.  Scheduled metoclopramide and Compazine.  Compazine changed to 10 mg IV every 6 hours as needed.  Diphenhydramine discontinued due to anticholinergic effects on 12/24.  Zofran and Compazine for breakthrough.  2 doses of Zofran given.  Ketorolac 30 mg IV every 6 hours as needed for pain.  3 doses given in 24 hours.  CVS: Persistent tachycardia HR 90-110s      -160s/60-90s.  PULM:  RR  11-21      SpO2 93% on room air  GI: Clear liquid diet.  Advance to full liquid diet.  Tolerating.  Stable intermittent nausea and vomiting overall improved.  Cortrack placed 12/25. TF Diabetisource AC at 20 mL/hr.  Advance to goal of 60 mL/hr as tolerated.  Status post multiple bowel movements following magnesium citrate and dulcolax suppository on 12/25.    Morphine changed to Toradol 12/25 for abdominal pain to minimize constipation.  PEG daily ordered 12/26 for bowel regimen.  :  I/O 335/2470 (+ 865 mL)  Anion gap 6.  CO2 23.  Potassium 3.2, Mg 1.9.    IVF D5 1/2 NS at 75 mL/hr. Changed to 50 mL/h.  HEME/ID:  Tmax 98.1Tc 98, WBC 4.4, Hemoglobin 11.5  ENDO:  Blood glucose  122-237.  Transitioned off insulin drip 12/25.  Glargine insulin 30 units given yesterday with 16 units of regular insulin per sign scale overnight.   Glargine insulin increased to home dose of 30 units subcutaneously twice daily.   Regular insulin sliding scale every 6 hours.  DVT:  Enoxaparin.      Review of Systems   Constitutional: Negative for chills, diaphoresis and fever.   HENT: Negative for congestion, ear pain and sore throat.    Eyes: Negative for blurred vision and double vision.   Respiratory: Negative for cough and sputum production.       Cardiovascular: Negative for chest pain, palpitations and leg swelling.   Gastrointestinal: Positive for abdominal pain (diffuse but improved), nausea (intermittent improved) and vomiting (intermimittent improved). Negative for constipation (no bowel movement since admission) and diarrhea.   Genitourinary: Negative for dysuria and urgency.   Musculoskeletal: Negative for back pain and myalgias.   Skin: Negative for itching and rash.   Neurological: Negative for tingling and headaches.   Psychiatric/Behavioral: Negative for depression. The patient is not nervous/anxious and does not have insomnia.        Disposition/Barriers to discharge:   None    Consultants/Specialty  Diabetes Health Educator    PCP: Navi Huber M.D.      Quality Measures  Quality-Core Measures   Reviewed items::  Labs reviewed, Medications reviewed, Radiology images reviewed and EKG reviewed  Pitt catheter::  No Pitt  DVT prophylaxis pharmacological::  Enoxaparin (Lovenox)  Ulcer Prophylaxis::  Yes    Disposition: Transfer to floor.      Physical Exam       Vitals:    12/26/18 0600 12/26/18 0700 12/26/18 0800 12/26/18 0900   BP:       Pulse: 85 87 94 (!) 101   Resp: 18 (!) 11 (!) 10 (!) 10   Temp: 36.7 °C (98 °F)  36.6 °C (97.8 °F)    TempSrc: Temporal  Temporal    SpO2: 91% 94% 94% 94%   Weight:       Height:         Body mass index is 30.38 kg/m².    Oxygen Therapy:  Pulse Oximetry: 94 %, O2 (LPM): 0, O2 Delivery: None (Room Air)    Physical Exam   Constitutional: She is oriented to person, place, and time. She appears distressed (mild due to nausea and vomiting).   HENT:   Head: Normocephalic and atraumatic.   Right Ear: External ear normal.   Left Ear: External ear normal.   Mouth/Throat: Oropharynx is clear and moist. No oropharyngeal exudate.   Eyes: Pupils are equal, round, and reactive to light. Right eye exhibits no discharge. Left eye exhibits no discharge. No scleral icterus.   Neck: Normal range of motion. Neck  supple. No JVD present. No tracheal deviation present. No thyromegaly present.   Cardiovascular: Normal rate, regular rhythm, normal heart sounds and intact distal pulses.  Exam reveals no gallop and no friction rub.    No murmur heard.  Pulmonary/Chest: Effort normal and breath sounds normal. No respiratory distress. She has no wheezes. She has no rales.   Abdominal: Soft. Bowel sounds are normal. She exhibits no distension and no mass. There is tenderness (diffuse abdominal pain to direct palpation). There is no rebound and no guarding.   Musculoskeletal: Normal range of motion. She exhibits no edema, tenderness or deformity.   Neurological: She is alert and oriented to person, place, and time. No cranial nerve deficit. She exhibits normal muscle tone.   Skin: Skin is warm. No rash noted. She is not diaphoretic. No erythema. No pallor.   Psychiatric: Mood and affect normal.   Nursing note and vitals reviewed.            Assessment/Plan     * Intractable nausea and vomiting- (present on admission)   Assessment & Plan    Persistent nausea and vomiting.  Improved follow multiple bowel movements of magnesium citrate and Dulcolax suppository.    Exact etiology of persistent nausea unclear but may have been partially attributable to constipation.  Patient reported no bowel movement since admission but records show last BM was on 12/20. Abdominal x-ray from 12/22 showedcopious stool within the bowels.   Mother reports having had H. pylori.    Patient had a normal gastric emptying scan in September 18, 2014.      Tube feeds increased to 20 mL/h 12/25.  Appears to be tolerating with stable nausea vomiting.  Plan:  -Continue scheduled metoclopramide 10 mg IV every 6 hours.  -Change Compazine to as needed.  -Continue Zofran as needed for breakthrough nausea as needed.  -Advance diet to full liquid diet.  -Advance tube feeds as tolerated.  -Repeat gastric emptying study when able to tolerate oral intake.  -Schedule  polyethylene glycol daily for constipation prevention.  -Follow-up H. pylori stool antigen.  -Check transglutaminase antibody to assess for celiac disease.     Pneumonia- (present on admission)   Assessment & Plan    More likely pneumonitis. Symptoms improving.  - h/o productive cough with sputum production  - CT shows small bilateral pleural effusions and patchy bilateral lower lobe airspace opacities may be infectious/inflammatory  - Normal WBC, afebrile  -Completed 5 day course of azithromycin 12/21-25.  Plan:  - Continue guaifenesin as needed for cough.     Diabetic ketoacidosis (HCC)- (present on admission)   Assessment & Plan    Resolved. Likely mixed with starvation ketosis since had not been eating.  Anion gap 6.  CO2 23.  Potassium 3.2, Mg pending.      Per mother, patient has been compliant but was not feeling well.  Suspect possible viral infection, which may have precipitated.    Plan:   -Increase glargine insulin to home regimen of 30 units twice daily.  -Decrease IVF D5 1/2 NS to 50 mL/hr.  -Advance tube feeds as tolerated.     Hypokalemia- (present on admission)   Assessment & Plan    Potassium low at 3.2 this morning..  Hypokalemia likely secondary to insulin.  Potassium goal >4.0.  Magnesium  goal >2.0.  Phosphorus goal >3.0.  Plan:  -Continue to monitor and replace per potassium replacement protocol.      Gastroparesis- (present on admission)   Assessment & Plan    Assumed patient's persistent nausea and vomiting secondary to diabetic gastroparesis due to uncontrolled diabetes.  However, gastric emptying scan performed on September 18, 2014 was normal.  Uncertain persistent nausea vomiting is due to diabetic gastroparesis but more due to narcotic induced constipation.  Patient reported not having a bowel movement since admission.  However, per records had a bowel movement on 12/20.  Abdominal x-ray on 12/22 shows copious stool within the bowel.  Plan:  -Start polyethylene glycol daily for  constipation prevention.  -Continue scheduled Reglan 10 mg IV every 6 hours.  -Repeat gastric emptying study when patient tolerating oral intake.     Diabetes type I (CMS-[facility])- (present on admission)   Assessment & Plan    - Poorly controlled, HbA1c 11.8. However, mother reports good compliance.  - Mother states patient was taking insulin but was feeling ill.  Per records, also may have possibly run out of rapid acting insulin due to insurance problems.  - Multiple previous DKA admissions.  Plan:  -Increase glargine insulin to home regimen of 30 units subcutaneously twice daily.  -Continue sliding scale insulin coverage.  -Continue hypoglycemia protocol.  -Diabetes education when on floor prior to discharge.     Normocytic anemia   Assessment & Plan    Suspect hemodilutional.    Plan:  -Continue to monitor.     Dehydration- (present on admission)   Assessment & Plan    Improved.  Secondary due to diabetic ketoacidosis and osmotic diuresis.  Plan:  -Continue tube feeds and IV fluids as per above.

## 2018-12-26 NOTE — PROGRESS NOTES
[facility] ICU Transfer Note                                                                                         ICU Admit Date: 12/18/2018, 12/23/2018        ICU Discharge Date:  12/21/2018, 12/26/2018        Primary Diagnosis:   Starvation ketosis secondary to severe medication induced constipation in setting of resolving diabetic ketoacidosis and persistent nausea and vomiting     ICU Course Summary (Brief Narrative):    Ms. Sparks is a pleasant 28-year-old female, who is initially admitted to the ICU on December 18, 2018 for diabetic ketoacidosis in the setting of poorly controlled diabetes mellitus type 1 (hemoglobin A1c 11.1 on November 10, 2018).  Her history is remarkable for peripheral neuropathy, autonomic dysfunction, and nephropathy.  Notably, she had a gastric emptying study performed on September 18, 2014 that was normal.  Patient was placed on an insulin drip with copious IV fluids following admission to the ICU.  She was transitioned off of the insulin drip and placed on subcutaneous glargine insulin 25 units daily in addition to insulin per sign scale prior to transfer to the medical floor on December 21, 2018.  While on the floor, patient continued to have nausea and vomiting refractory to multiple antiemetics and was therefore, not able to demonstrate any significant oral intake.  Due to increasing anion gap and low bicarbonate levels with tachycardia, tachypnea, and elevated blood pressures, patient was transferred back to the ICU on December 23, 2018, after which she was placed back on an insulin drip with IV fluids.  Her labs and history were consistent with starvation ketosis with underlying mild diabetic ketoacidosis.  Due to inability to tolerate oral intake, a Cortrack was placed and started on tube feeds, which she tolerating with baseline intermittent nausea and vomiting.  Scheduled diphenhydramine was discontinued due to concern for its anticholinergic effects, including worsening  constipation.  Patient reported not having a bowel movement since admission.  However, per nursing records, her last bowel movement was on December 20, 2018.  Abdominal x-ray on December 22, 2018 showed copious stool retention.  Therefore, patient was given magnesium citrate as well as a Dulcolax suppository followed by multiple bowel movements resulting in improvement of her nausea and vomiting.  She was placed on polyethylene glycol daily for bowel regimen.  Morphine IV was discontinued in lieu of IV ketorolac for persistent abdominal pain to decrease risk of constipation.  Her tube feeds were increased slowly.  Gastric emptying study was ordered but could not be performed due to patient's inability to maintain oral intake.  Patient was transitioned off of insulin drip on December 25, 2018.  She was placed back on her home glargine insulin regimen of 30 units subcutaneously twice daily on December 26, 2018 and was maintained on minimal IV fluids with D10 1/2 NS at 50 mL/h.  Tube feed rate was increased to goal.  Testing for causes of persistent nausea vomiting including H. pylori stool antigen as well as transglutaminase were ordered.  She continued to remain medically stable until day of transfer to ICU.       Important Events in the ICU:   - Central Line: None   - Intubation: None   - Pressors: None   - Pitt catheter: None  - Tube feeding: Cortrack placed 12/24/2018.  Diabetasource 20 mL/h.  Orders placed to increase to goal of 60 mL/h as tolerated.   - Antibiotics: Azithromycin 12/21-12/24.  - Other procedures: None         Labs and imaging studies to be continued with their indications:  - CBC: Yes; anemia.  - CMP or BMP: Yes, resolving DKA, starvation ketoacidosis   - Magnesium: Yes, diabetes.  - Phosphorus: No   - Chest Xray: No; unless having worsening respiratory status  - Other studies: None         Things to follow:   - Anion gap.  Electrolytes.  - Consider gastric emptying study when patient able to  tolerate oral intake to assess for gastroparesis.  - Follow up H. Pylori stool antigen and celiac panel/transglutaminase antibodies.  - Transition off IV fluids if tolerating oral intake and/or increasing tube feed rate.  - Placed back on home glargine insulin 30 units subcutaneously twice daily.  Regular insulin sliding scale.  Consider modifications according to oral intake.  - Diabetic education prior to discharge.

## 2018-12-27 LAB
ANION GAP SERPL CALC-SCNC: 7 MMOL/L (ref 0–11.9)
BASOPHILS # BLD AUTO: 0.4 % (ref 0–1.8)
BASOPHILS # BLD: 0.02 K/UL (ref 0–0.12)
BUN SERPL-MCNC: 10 MG/DL (ref 8–22)
CALCIUM SERPL-MCNC: 8.5 MG/DL (ref 8.5–10.5)
CHLORIDE SERPL-SCNC: 104 MMOL/L (ref 96–112)
CO2 SERPL-SCNC: 27 MMOL/L (ref 20–33)
CREAT SERPL-MCNC: 0.38 MG/DL (ref 0.5–1.4)
EOSINOPHIL # BLD AUTO: 0.15 K/UL (ref 0–0.51)
EOSINOPHIL NFR BLD: 3.1 % (ref 0–6.9)
ERYTHROCYTE [DISTWIDTH] IN BLOOD BY AUTOMATED COUNT: 40.6 FL (ref 35.9–50)
GLUCOSE BLD-MCNC: 139 MG/DL (ref 65–99)
GLUCOSE BLD-MCNC: 175 MG/DL (ref 65–99)
GLUCOSE BLD-MCNC: 224 MG/DL (ref 65–99)
GLUCOSE SERPL-MCNC: 168 MG/DL (ref 65–99)
HCT VFR BLD AUTO: 36.9 % (ref 37–47)
HGB BLD-MCNC: 12.3 G/DL (ref 12–16)
IMM GRANULOCYTES # BLD AUTO: 0.02 K/UL (ref 0–0.11)
IMM GRANULOCYTES NFR BLD AUTO: 0.4 % (ref 0–0.9)
LYMPHOCYTES # BLD AUTO: 1.96 K/UL (ref 1–4.8)
LYMPHOCYTES NFR BLD: 41.1 % (ref 22–41)
MCH RBC QN AUTO: 28.8 PG (ref 27–33)
MCHC RBC AUTO-ENTMCNC: 33.3 G/DL (ref 33.6–35)
MCV RBC AUTO: 86.4 FL (ref 81.4–97.8)
MONOCYTES # BLD AUTO: 0.7 K/UL (ref 0–0.85)
MONOCYTES NFR BLD AUTO: 14.7 % (ref 0–13.4)
NEUTROPHILS # BLD AUTO: 1.92 K/UL (ref 2–7.15)
NEUTROPHILS NFR BLD: 40.3 % (ref 44–72)
NRBC # BLD AUTO: 0 K/UL
NRBC BLD-RTO: 0 /100 WBC
PLATELET # BLD AUTO: 181 K/UL (ref 164–446)
PMV BLD AUTO: 10 FL (ref 9–12.9)
POTASSIUM SERPL-SCNC: 3.7 MMOL/L (ref 3.6–5.5)
RBC # BLD AUTO: 4.27 M/UL (ref 4.2–5.4)
SODIUM SERPL-SCNC: 138 MMOL/L (ref 135–145)
WBC # BLD AUTO: 4.8 K/UL (ref 4.8–10.8)

## 2018-12-27 PROCEDURE — 770006 HCHG ROOM/CARE - MED/SURG/GYN SEMI*

## 2018-12-27 PROCEDURE — A9270 NON-COVERED ITEM OR SERVICE: HCPCS | Performed by: INTERNAL MEDICINE

## 2018-12-27 PROCEDURE — 700102 HCHG RX REV CODE 250 W/ 637 OVERRIDE(OP): Performed by: INTERNAL MEDICINE

## 2018-12-27 PROCEDURE — A9270 NON-COVERED ITEM OR SERVICE: HCPCS | Performed by: STUDENT IN AN ORGANIZED HEALTH CARE EDUCATION/TRAINING PROGRAM

## 2018-12-27 PROCEDURE — 80048 BASIC METABOLIC PNL TOTAL CA: CPT

## 2018-12-27 PROCEDURE — 85025 COMPLETE CBC W/AUTO DIFF WBC: CPT

## 2018-12-27 PROCEDURE — 700102 HCHG RX REV CODE 250 W/ 637 OVERRIDE(OP): Performed by: STUDENT IN AN ORGANIZED HEALTH CARE EDUCATION/TRAINING PROGRAM

## 2018-12-27 PROCEDURE — 99232 SBSQ HOSP IP/OBS MODERATE 35: CPT | Mod: GC | Performed by: INTERNAL MEDICINE

## 2018-12-27 PROCEDURE — 700111 HCHG RX REV CODE 636 W/ 250 OVERRIDE (IP): Performed by: INTERNAL MEDICINE

## 2018-12-27 PROCEDURE — 700105 HCHG RX REV CODE 258: Performed by: STUDENT IN AN ORGANIZED HEALTH CARE EDUCATION/TRAINING PROGRAM

## 2018-12-27 PROCEDURE — 700111 HCHG RX REV CODE 636 W/ 250 OVERRIDE (IP): Performed by: STUDENT IN AN ORGANIZED HEALTH CARE EDUCATION/TRAINING PROGRAM

## 2018-12-27 PROCEDURE — 82962 GLUCOSE BLOOD TEST: CPT

## 2018-12-27 PROCEDURE — 36415 COLL VENOUS BLD VENIPUNCTURE: CPT

## 2018-12-27 RX ORDER — LOSARTAN POTASSIUM 25 MG/1
25 TABLET ORAL
Status: DISCONTINUED | OUTPATIENT
Start: 2018-12-27 | End: 2018-12-28

## 2018-12-27 RX ORDER — BISMUTH SUBSALICYLATE 262 MG/1
524 TABLET, CHEWABLE ORAL ONCE
Status: COMPLETED | OUTPATIENT
Start: 2018-12-27 | End: 2018-12-27

## 2018-12-27 RX ADMIN — STANDARDIZED SENNA CONCENTRATE AND DOCUSATE SODIUM 2 TABLET: 8.6; 5 TABLET, FILM COATED ORAL at 05:56

## 2018-12-27 RX ADMIN — METOCLOPRAMIDE 10 MG: 10 TABLET ORAL at 11:15

## 2018-12-27 RX ADMIN — GUAIFENESIN 600 MG: 600 TABLET, EXTENDED RELEASE ORAL at 17:06

## 2018-12-27 RX ADMIN — BISMUTH SUBSALICYLATE 524 MG: 262 TABLET, CHEWABLE ORAL at 21:56

## 2018-12-27 RX ADMIN — HYDRALAZINE HYDROCHLORIDE 10 MG: 20 INJECTION INTRAMUSCULAR; INTRAVENOUS at 06:00

## 2018-12-27 RX ADMIN — INSULIN GLARGINE 30 UNITS: 100 INJECTION, SOLUTION SUBCUTANEOUS at 17:07

## 2018-12-27 RX ADMIN — LOSARTAN POTASSIUM 25 MG: 25 TABLET, FILM COATED ORAL at 15:01

## 2018-12-27 RX ADMIN — ATORVASTATIN CALCIUM 20 MG: 20 TABLET, FILM COATED ORAL at 05:55

## 2018-12-27 RX ADMIN — ENOXAPARIN SODIUM 40 MG: 100 INJECTION SUBCUTANEOUS at 05:58

## 2018-12-27 RX ADMIN — METOCLOPRAMIDE 10 MG: 10 TABLET ORAL at 17:06

## 2018-12-27 RX ADMIN — HYDRALAZINE HYDROCHLORIDE 10 MG: 20 INJECTION INTRAMUSCULAR; INTRAVENOUS at 16:43

## 2018-12-27 RX ADMIN — POTASSIUM BICARBONATE 25 MEQ: 25 TABLET, EFFERVESCENT ORAL at 05:55

## 2018-12-27 RX ADMIN — INSULIN GLARGINE 30 UNITS: 100 INJECTION, SOLUTION SUBCUTANEOUS at 06:05

## 2018-12-27 RX ADMIN — OMEPRAZOLE 20 MG: 20 CAPSULE, DELAYED RELEASE ORAL at 05:56

## 2018-12-27 RX ADMIN — METOCLOPRAMIDE 10 MG: 10 TABLET ORAL at 05:56

## 2018-12-27 RX ADMIN — POLYETHYLENE GLYCOL 3350 1 PACKET: 17 POWDER, FOR SOLUTION ORAL at 05:55

## 2018-12-27 RX ADMIN — DEXTROSE AND SODIUM CHLORIDE: 5; 450 INJECTION, SOLUTION INTRAVENOUS at 13:36

## 2018-12-27 ASSESSMENT — ENCOUNTER SYMPTOMS
TINGLING: 0
MYALGIAS: 0
DOUBLE VISION: 0
SENSORY CHANGE: 0
SPUTUM PRODUCTION: 0
BACK PAIN: 0
ABDOMINAL PAIN: 0
VOMITING: 0
SINUS PAIN: 0
HEADACHES: 0
NAUSEA: 0
FEVER: 0
COUGH: 0
SORE THROAT: 0
BLURRED VISION: 0
DIAPHORESIS: 0
PALPITATIONS: 0
DEPRESSION: 0
NERVOUS/ANXIOUS: 0
CHILLS: 0

## 2018-12-27 ASSESSMENT — PAIN SCALES - GENERAL
PAINLEVEL_OUTOF10: 0
PAINLEVEL_OUTOF10: ASSUMED PAIN PRESENT
PAINLEVEL_OUTOF10: 5
PAINLEVEL_OUTOF10: ASSUMED PAIN PRESENT
PAINLEVEL_OUTOF10: 5

## 2018-12-27 NOTE — CARE PLAN
Problem: Infection  Goal: Will remain free from infection  Outcome: PROGRESSING AS EXPECTED  Pt educated on handwashing and signs and symptoms of infection.     Problem: Knowledge Deficit  Goal: Knowledge of the prescribed therapeutic regimen will improve  Outcome: PROGRESSING AS EXPECTED  Reviewed POC including safety, mobility, pain management, medication administration, DVT prophylaxis, tube feed goal, insulin, diet, and discharge. Call light within reach. Pt calls appropriately. Hourly rounding in place. Pt has no needs or concerns at this time.

## 2018-12-27 NOTE — PROGRESS NOTES
Paged [facility] for high BP. MD notified. Instructed to continue to monitor and MD will be rounding soon. No new orders received.

## 2018-12-27 NOTE — CARE PLAN
Problem: Safety  Goal: Will remain free from injury  Outcome: PROGRESSING AS EXPECTED  Safety precautions in place. Call light within reach. Bed is low and in locked position. Hourly rounding in place.     Problem: Bowel/Gastric:  Goal: Will not experience complications related to bowel motility  Outcome: PROGRESSING AS EXPECTED  Pt is not complaining of any abdominal pain. Denies nausea.

## 2018-12-27 NOTE — CARE PLAN
Problem: Safety  Goal: Will remain free from injury  Outcome: PROGRESSING AS EXPECTED  Safety precautions in place. Call light within reach. Bed is low and in locked position. Hourly rounding in place.     Problem: Pain Management  Goal: Pain level will decrease to patient's comfort goal  Outcome: PROGRESSING AS EXPECTED  Gave toradol for 7/10 pain.

## 2018-12-27 NOTE — PROGRESS NOTES
Internal Medicine Interval Note  Note Author: Shiraz Adame M.D.     Name Alis Sparks     1990   Age/Sex 28 y.o. female   MRN [id number]   Code Status FULL     After 5PM or if no immediate response to page, please call for cross-coverage  Attending/Team: Dr. Atkins/Jose Enrique See Patient List for primary contact information  Call [phone] to page    1st Call - Day Intern (R1):   Dr. Adame 2nd Call - Day Sr. Resident (R2/R3):   Dr. Gómez         Reason for interval visit  (Principal Problem)   Nausea, vomiting      Interval Problem Daily Status Update  (24 hours, problem oriented, brief subjective history, new lab/imaging data pertinent to that problem)   No acute events overnight. Patient transferred out of the ICU yesterday.  She was lying comfortably on the bed this morning; denies any nausea or vomiting and abdominal pain at this time. States abdominal pain is intermittent.  Is able to tolerate tube feeds well at this time. Advanced diet to GI soft diabetic diet today and if patient is consuming more than 50% of her meal tray and tolerating well, will consider removing cortrack.  Her blood pressure has been running in 150s/100s; will start losartan.  She is on Lantus 30 units twice daily and regular insulin sliding scale.  Anion gap and electrolytes are within normal limits.  H. pylori stool antigen test is still pending.      Review of Systems   Constitutional: Negative for chills, diaphoresis and fever.   HENT: Negative for congestion, sinus pain and sore throat.    Eyes: Negative for blurred vision and double vision.   Respiratory: Negative for cough and sputum production.    Cardiovascular: Negative for chest pain, palpitations and leg swelling.   Gastrointestinal: Negative for abdominal pain, nausea and vomiting.   Genitourinary: Negative for dysuria and urgency.   Musculoskeletal: Negative for back pain and myalgias.   Skin: Negative for itching and rash.    Neurological: Negative for tingling, sensory change and headaches.   Psychiatric/Behavioral: Negative for depression. The patient is not nervous/anxious.        Disposition/Barriers to discharge:   None    Consultants/Specialty  Diabetes Health Educator    PCP: Navi Huber M.D.      Quality Measures  Quality-Core Measures   Reviewed items::  Labs reviewed, Medications reviewed and Radiology images reviewed  Pitt catheter::  No Pitt  DVT prophylaxis pharmacological::  Enoxaparin (Lovenox)          Physical Exam       Vitals:    12/27/18 0800 12/27/18 0844 12/27/18 1330 12/27/18 1438   BP: 154/109 151/107 156/108 (P) 159/115   Pulse: (!) 118 (!) 106  (P) 96   Resp: 17      Temp: 36.2 °C (97.1 °F)      TempSrc: Temporal      SpO2: 96%   (P) 96%   Weight:       Height:         Body mass index is 30.38 kg/m².    Oxygen Therapy:  Pulse Oximetry: (P) 96 %, O2 (LPM): (P) 0, O2 Delivery: None (Room Air)    Physical Exam   Constitutional: She is oriented to person, place, and time and well-developed, well-nourished, and in no distress.   HENT:   Head: Normocephalic and atraumatic.   Eyes: Pupils are equal, round, and reactive to light. No scleral icterus.   Neck: Normal range of motion. Neck supple.   Cardiovascular: Normal rate, regular rhythm and normal heart sounds.    Pulmonary/Chest: Effort normal and breath sounds normal. No respiratory distress.   Abdominal: Soft. Bowel sounds are normal. She exhibits no distension. There is no tenderness.   Musculoskeletal: Normal range of motion. She exhibits no edema.   Neurological: She is alert and oriented to person, place, and time.   Skin: Skin is warm. No erythema.   Psychiatric: Mood and affect normal.             Assessment/Plan     * Intractable nausea and vomiting- (present on admission)   Assessment & Plan    Improving  - Tube feeds increased to 60 mL/h prior to transfer from ICU.  Appears to be tolerating well.  - Advanced her diet to GI soft diabetic  diet    Plan:  - If consuming more than 50% of her meal tray and tolerating well, will consider removing cortrack  - Continue anti-nausea medications  - Scheduled polyethylene glycol daily for constipation prevention  - H. pylori stool antigen test pending  - Check transglutaminase antibody to assess for celiac disease     Pneumonia- (present on admission)   Assessment & Plan    - More likely pneumonitis  - Symptoms improving  - h/o productive cough with sputum production  - CT shows small bilateral pleural effusions and patchy bilateral lower lobe airspace opacities may be infectious/inflammatory  - Normal WBC, afebrile  - Completed 5 day course of azithromycin 12/21-25    Plan:  - Continue guaifenesin as needed for cough     Diabetic ketoacidosis (HCC)- (present on admission)   Assessment & Plan    Resolved  - Anion gap and electrolytes WNL    Plan:   - Continue glargine insulin 30 units twice daily  - IVF  - Advance diet as tolerated     Hypokalemia- (present on admission)   Assessment & Plan    Resolved  - Continue to monitor and replace     Gastroparesis- (present on admission)   Assessment & Plan    - Assumed patient's persistent nausea and vomiting secondary to diabetic gastroparesis due to uncontrolled diabetes  - Gastric emptying scan performed on September 18, 2014 was normal  - Uncertain persistent nausea vomiting is due to diabetic gastroparesis but more due to narcotic induced constipation. Patient had a bowel movement on 12/20.  Abdominal x-ray on 12/22 shows copious stool within the bowel.    Plan:  - Scheduled polyethylene glycol daily for constipation prevention  - Continue scheduled Reglan 10 mg IV every 6 hours     Diabetes type I (CMS-HCC)- (present on admission)   Assessment & Plan    - Poorly controlled, HbA1c 11.8. However, mother reports good compliance.  - Mother states patient was taking insulin but was feeling ill.  Per records, also may have possibly run out of rapid acting insulin due to  insurance problems.  - Multiple previous DKA admissions    Plan:  - Continue Lantus 30 units twice daily  - Continue sliding scale insulin coverage  - Hypoglycemia protocol  - Diabetes education prior to discharge     Normocytic anemia   Assessment & Plan    - Suspect hemodilutional    Plan:  -Continue to monitor     Dehydration- (present on admission)   Assessment & Plan    Improved  - Secondary to diabetic ketoacidosis and osmotic diuresis    Plan:  - Continue tube feeds and IV fluids; can discontinue if tolerating oral diet

## 2018-12-27 NOTE — CARE PLAN
Problem: Nutritional:  Goal: Nutrition support tolerated and meeting greater than 85% of estimated needs  Outcome: MET Date Met: 12/27/18  Diabetisource @ 60 ml/hr  Poor intake of oral diet continues 25-50% of full liquid diet yesterday  Diet advanced to GI soft DM diet today

## 2018-12-28 LAB
ALBUMIN SERPL BCP-MCNC: 3.5 G/DL (ref 3.2–4.9)
ALBUMIN/GLOB SERPL: 1.8 G/DL
ALP SERPL-CCNC: 91 U/L (ref 30–99)
ALT SERPL-CCNC: 18 U/L (ref 2–50)
ANION GAP SERPL CALC-SCNC: 10 MMOL/L (ref 0–11.9)
AST SERPL-CCNC: 18 U/L (ref 12–45)
BASOPHILS # BLD AUTO: 0.4 % (ref 0–1.8)
BASOPHILS # BLD: 0.02 K/UL (ref 0–0.12)
BILIRUB SERPL-MCNC: 0.5 MG/DL (ref 0.1–1.5)
BUN SERPL-MCNC: 8 MG/DL (ref 8–22)
CALCIUM SERPL-MCNC: 9.4 MG/DL (ref 8.5–10.5)
CHLORIDE SERPL-SCNC: 104 MMOL/L (ref 96–112)
CO2 SERPL-SCNC: 25 MMOL/L (ref 20–33)
CREAT SERPL-MCNC: 0.46 MG/DL (ref 0.5–1.4)
EOSINOPHIL # BLD AUTO: 0.15 K/UL (ref 0–0.51)
EOSINOPHIL NFR BLD: 2.7 % (ref 0–6.9)
ERYTHROCYTE [DISTWIDTH] IN BLOOD BY AUTOMATED COUNT: 40.6 FL (ref 35.9–50)
GLOBULIN SER CALC-MCNC: 1.9 G/DL (ref 1.9–3.5)
GLUCOSE BLD-MCNC: 162 MG/DL (ref 65–99)
GLUCOSE BLD-MCNC: 191 MG/DL (ref 65–99)
GLUCOSE BLD-MCNC: 218 MG/DL (ref 65–99)
GLUCOSE BLD-MCNC: 88 MG/DL (ref 65–99)
GLUCOSE SERPL-MCNC: 143 MG/DL (ref 65–99)
HCT VFR BLD AUTO: 37.4 % (ref 37–47)
HGB BLD-MCNC: 13 G/DL (ref 12–16)
IGA SERPL-MCNC: 131 MG/DL (ref 68–408)
IMM GRANULOCYTES # BLD AUTO: 0.02 K/UL (ref 0–0.11)
IMM GRANULOCYTES NFR BLD AUTO: 0.4 % (ref 0–0.9)
LYMPHOCYTES # BLD AUTO: 2.11 K/UL (ref 1–4.8)
LYMPHOCYTES NFR BLD: 38.4 % (ref 22–41)
MAGNESIUM SERPL-MCNC: 1.9 MG/DL (ref 1.5–2.5)
MCH RBC QN AUTO: 29.8 PG (ref 27–33)
MCHC RBC AUTO-ENTMCNC: 34.8 G/DL (ref 33.6–35)
MCV RBC AUTO: 85.8 FL (ref 81.4–97.8)
MONOCYTES # BLD AUTO: 0.75 K/UL (ref 0–0.85)
MONOCYTES NFR BLD AUTO: 13.6 % (ref 0–13.4)
NEUTROPHILS # BLD AUTO: 2.45 K/UL (ref 2–7.15)
NEUTROPHILS NFR BLD: 44.5 % (ref 44–72)
NRBC # BLD AUTO: 0 K/UL
NRBC BLD-RTO: 0 /100 WBC
PHOSPHATE SERPL-MCNC: 3.6 MG/DL (ref 2.5–4.5)
PLATELET # BLD AUTO: 203 K/UL (ref 164–446)
PMV BLD AUTO: 10.6 FL (ref 9–12.9)
POTASSIUM SERPL-SCNC: 3.5 MMOL/L (ref 3.6–5.5)
PROT SERPL-MCNC: 5.4 G/DL (ref 6–8.2)
RBC # BLD AUTO: 4.36 M/UL (ref 4.2–5.4)
SODIUM SERPL-SCNC: 139 MMOL/L (ref 135–145)
TTG IGA SER IA-ACNC: 0 U/ML (ref 0–3)
WBC # BLD AUTO: 5.5 K/UL (ref 4.8–10.8)

## 2018-12-28 PROCEDURE — 700111 HCHG RX REV CODE 636 W/ 250 OVERRIDE (IP): Performed by: STUDENT IN AN ORGANIZED HEALTH CARE EDUCATION/TRAINING PROGRAM

## 2018-12-28 PROCEDURE — A9270 NON-COVERED ITEM OR SERVICE: HCPCS | Performed by: STUDENT IN AN ORGANIZED HEALTH CARE EDUCATION/TRAINING PROGRAM

## 2018-12-28 PROCEDURE — 700105 HCHG RX REV CODE 258: Performed by: STUDENT IN AN ORGANIZED HEALTH CARE EDUCATION/TRAINING PROGRAM

## 2018-12-28 PROCEDURE — 83735 ASSAY OF MAGNESIUM: CPT

## 2018-12-28 PROCEDURE — 700111 HCHG RX REV CODE 636 W/ 250 OVERRIDE (IP): Performed by: INTERNAL MEDICINE

## 2018-12-28 PROCEDURE — A9270 NON-COVERED ITEM OR SERVICE: HCPCS | Performed by: INTERNAL MEDICINE

## 2018-12-28 PROCEDURE — 80053 COMPREHEN METABOLIC PANEL: CPT

## 2018-12-28 PROCEDURE — 85025 COMPLETE CBC W/AUTO DIFF WBC: CPT

## 2018-12-28 PROCEDURE — 700102 HCHG RX REV CODE 250 W/ 637 OVERRIDE(OP): Performed by: INTERNAL MEDICINE

## 2018-12-28 PROCEDURE — 700102 HCHG RX REV CODE 250 W/ 637 OVERRIDE(OP): Performed by: STUDENT IN AN ORGANIZED HEALTH CARE EDUCATION/TRAINING PROGRAM

## 2018-12-28 PROCEDURE — 36415 COLL VENOUS BLD VENIPUNCTURE: CPT

## 2018-12-28 PROCEDURE — 51798 US URINE CAPACITY MEASURE: CPT

## 2018-12-28 PROCEDURE — 84100 ASSAY OF PHOSPHORUS: CPT

## 2018-12-28 PROCEDURE — 770006 HCHG ROOM/CARE - MED/SURG/GYN SEMI*

## 2018-12-28 PROCEDURE — 82962 GLUCOSE BLOOD TEST: CPT

## 2018-12-28 RX ORDER — LOSARTAN POTASSIUM 50 MG/1
50 TABLET ORAL
Status: DISCONTINUED | OUTPATIENT
Start: 2018-12-29 | End: 2018-12-29 | Stop reason: HOSPADM

## 2018-12-28 RX ORDER — POTASSIUM CHLORIDE 7.45 MG/ML
10 INJECTION INTRAVENOUS
Status: COMPLETED | OUTPATIENT
Start: 2018-12-28 | End: 2018-12-28

## 2018-12-28 RX ADMIN — LOSARTAN POTASSIUM 25 MG: 25 TABLET, FILM COATED ORAL at 06:13

## 2018-12-28 RX ADMIN — METOCLOPRAMIDE 10 MG: 10 TABLET ORAL at 06:13

## 2018-12-28 RX ADMIN — DEXTROSE AND SODIUM CHLORIDE: 5; 450 INJECTION, SOLUTION INTRAVENOUS at 08:40

## 2018-12-28 RX ADMIN — METOCLOPRAMIDE 10 MG: 10 TABLET ORAL at 00:38

## 2018-12-28 RX ADMIN — GUAIFENESIN 600 MG: 600 TABLET, EXTENDED RELEASE ORAL at 06:13

## 2018-12-28 RX ADMIN — INSULIN GLARGINE 30 UNITS: 100 INJECTION, SOLUTION SUBCUTANEOUS at 08:53

## 2018-12-28 RX ADMIN — POTASSIUM CHLORIDE 10 MEQ: 7.46 INJECTION, SOLUTION INTRAVENOUS at 11:49

## 2018-12-28 RX ADMIN — METOCLOPRAMIDE 10 MG: 10 TABLET ORAL at 11:49

## 2018-12-28 RX ADMIN — STANDARDIZED SENNA CONCENTRATE AND DOCUSATE SODIUM 2 TABLET: 8.6; 5 TABLET, FILM COATED ORAL at 17:05

## 2018-12-28 RX ADMIN — GUAIFENESIN 600 MG: 600 TABLET, EXTENDED RELEASE ORAL at 17:05

## 2018-12-28 RX ADMIN — POTASSIUM CHLORIDE 10 MEQ: 7.46 INJECTION, SOLUTION INTRAVENOUS at 10:27

## 2018-12-28 RX ADMIN — ENOXAPARIN SODIUM 40 MG: 100 INJECTION SUBCUTANEOUS at 06:16

## 2018-12-28 RX ADMIN — METOCLOPRAMIDE 10 MG: 10 TABLET ORAL at 17:05

## 2018-12-28 RX ADMIN — OMEPRAZOLE 20 MG: 20 CAPSULE, DELAYED RELEASE ORAL at 06:13

## 2018-12-28 RX ADMIN — PROCHLORPERAZINE EDISYLATE 10 MG: 5 INJECTION INTRAMUSCULAR; INTRAVENOUS at 08:45

## 2018-12-28 RX ADMIN — ONDANSETRON 4 MG: 2 INJECTION INTRAMUSCULAR; INTRAVENOUS at 02:11

## 2018-12-28 RX ADMIN — ATORVASTATIN CALCIUM 20 MG: 20 TABLET, FILM COATED ORAL at 06:13

## 2018-12-28 RX ADMIN — INSULIN GLARGINE 30 UNITS: 100 INJECTION, SOLUTION SUBCUTANEOUS at 17:07

## 2018-12-28 RX ADMIN — METOCLOPRAMIDE 10 MG: 10 TABLET ORAL at 23:40

## 2018-12-28 RX ADMIN — POTASSIUM CHLORIDE 10 MEQ: 7.46 INJECTION, SOLUTION INTRAVENOUS at 13:05

## 2018-12-28 RX ADMIN — POTASSIUM CHLORIDE 10 MEQ: 7.46 INJECTION, SOLUTION INTRAVENOUS at 09:02

## 2018-12-28 ASSESSMENT — ENCOUNTER SYMPTOMS
NERVOUS/ANXIOUS: 0
TINGLING: 0
BLURRED VISION: 0
CHILLS: 0
SPUTUM PRODUCTION: 0
SINUS PAIN: 0
BACK PAIN: 0
SENSORY CHANGE: 0
MYALGIAS: 0
HEADACHES: 0
DOUBLE VISION: 0
FEVER: 0
SORE THROAT: 0
NAUSEA: 0
PALPITATIONS: 0
DEPRESSION: 0
COUGH: 0
VOMITING: 0
DIAPHORESIS: 0
ABDOMINAL PAIN: 0

## 2018-12-28 ASSESSMENT — PAIN SCALES - GENERAL
PAINLEVEL_OUTOF10: 0

## 2018-12-28 NOTE — PROGRESS NOTES
Pt consumed more than 75% of lunch and dinner tray. MD notified. Order received to pull the cortrak out.

## 2018-12-28 NOTE — CARE PLAN
Problem: Communication  Goal: The ability to communicate needs accurately and effectively will improve  Outcome: PROGRESSING AS EXPECTED  Report given bedside. Pt updated on plan of care. Pt verbalizes understanding. Pt encouraged to voice needs and concerns. Communication board in use. Call light within reach. Pt calls appropriately. Hourly rounding in place. Pt has no needs or concerns at this time.     Problem: Psychosocial Needs:  Goal: Level of anxiety will decrease  Outcome: PROGRESSING AS EXPECTED  Pt is calm and resting in bed. Pt does not express or exhibit signs of anxiety at this time.

## 2018-12-28 NOTE — PROGRESS NOTES
Internal Medicine Interval Note  Note Author: Shiraz Adame M.D.     Name Alis Sparks     1990   Age/Sex 28 y.o. female   MRN [id number]   Code Status FULL     After 5PM or if no immediate response to page, please call for cross-coverage  Attending/Team: Dr. Atkins/Jose Enrique See Patient List for primary contact information  Call [phone] to page    1st Call - Day Intern (R1):   Dr. Adame 2nd Call - Day Sr. Resident (R2/R3):   Dr. Gómez         Reason for interval visit  (Principal Problem)   Nausea, vomiting      Interval Problem Daily Status Update  (24 hours, problem oriented, brief subjective history, new lab/imaging data pertinent to that problem)   No acute events overnight. Patient denies any nausea or vomiting and abdominal pain at this time. States she had nausea and 2 episodes of emesis (a little clear yellow-green fluid) this morning before breakfast. She had breakfast later and did not have any nausea or vomiting after that. She is able to tolerate oral feeds at this time. Cortrak was removed last evening.  Per RN, patient has been sleeping most of the day and has not even gotten up to go to the bathroom; patient states she does not have to go. RN to try again to get patient up to take a short walk.  Will get a gastric emptying study tomorrow.  Continue on the current dose of insulin.      Review of Systems   Constitutional: Negative for chills, diaphoresis and fever.   HENT: Negative for congestion, sinus pain and sore throat.    Eyes: Negative for blurred vision and double vision.   Respiratory: Negative for cough and sputum production.    Cardiovascular: Negative for chest pain, palpitations and leg swelling.   Gastrointestinal: Negative for abdominal pain, nausea and vomiting.   Genitourinary: Negative for dysuria and urgency.   Musculoskeletal: Negative for back pain and myalgias.   Skin: Negative for itching and rash.   Neurological: Negative for  tingling, sensory change and headaches.   Psychiatric/Behavioral: Negative for depression. The patient is not nervous/anxious.        Disposition/Barriers to discharge:   None    Consultants/Specialty  Diabetes Health Educator    PCP: Navi Huber M.D.      Quality Measures  Quality-Core Measures   Reviewed items::  Labs reviewed, Medications reviewed and Radiology images reviewed  Pitt catheter::  No Pitt  DVT prophylaxis pharmacological::  Enoxaparin (Lovenox)          Physical Exam       Vitals:    12/28/18 0000 12/28/18 0500 12/28/18 0800 12/28/18 1200   BP: 148/100 140/91 151/104 153/91   Pulse: (!) 105 (!) 107 (!) 114 96   Resp: 15 14 17 16   Temp: 37.3 °C (99.2 °F) 36.5 °C (97.7 °F) 36.6 °C (97.9 °F) 36.2 °C (97.2 °F)   TempSrc: Temporal Temporal Temporal Temporal   SpO2: 95% 94% 96% 97%   Weight:       Height:         Body mass index is 30.38 kg/m².    Oxygen Therapy:  Pulse Oximetry: 97 %, O2 (LPM): 0, O2 Delivery: None (Room Air)    Physical Exam   Constitutional: She is oriented to person, place, and time and well-developed, well-nourished, and in no distress.   HENT:   Head: Normocephalic and atraumatic.   Eyes: Pupils are equal, round, and reactive to light. No scleral icterus.   Neck: Normal range of motion. Neck supple.   Cardiovascular: Normal rate, regular rhythm and normal heart sounds.    Pulmonary/Chest: Effort normal and breath sounds normal. No respiratory distress.   Abdominal: Soft. Bowel sounds are normal. She exhibits no distension. There is no tenderness.   Musculoskeletal: Normal range of motion. She exhibits no edema.   Neurological: She is alert and oriented to person, place, and time.   Skin: Skin is warm. No erythema.   Psychiatric: Mood and affect normal.             Assessment/Plan     * Intractable nausea and vomiting- (present on admission)   Assessment & Plan    Improving  - Nausea and 2 episodes of emesis this morning; no episodes after this  - Cortrak removed last  evening  - Patient tolerating GI soft diabetic diet    Plan:  - Continue anti-nausea medications  - Scheduled polyethylene glycol daily for constipation prevention  - H. pylori stool antigen test pending  - Check transglutaminase antibody to assess for celiac disease  - Gastric emptying study ordered     Pneumonia- (present on admission)   Assessment & Plan    - More likely pneumonitis  - Symptoms improving  - h/o productive cough with sputum production  - CT shows small bilateral pleural effusions and patchy bilateral lower lobe airspace opacities may be infectious/inflammatory  - Normal WBC, afebrile  - Completed 5 day course of azithromycin 12/21-25    Plan:  - Continue guaifenesin as needed for cough     Diabetic ketoacidosis (HCC)- (present on admission)   Assessment & Plan    Resolved  - Anion gap and electrolytes WNL    Plan:   - Continue glargine insulin 30 units twice daily  - IVF  - Advance diet as tolerated     Hypokalemia- (present on admission)   Assessment & Plan    Resolved  - Continue to monitor and replace     Gastroparesis- (present on admission)   Assessment & Plan    - Assumed patient's persistent nausea and vomiting secondary to diabetic gastroparesis due to uncontrolled diabetes  - Gastric emptying scan performed on September 18, 2014 was normal  - Uncertain persistent nausea vomiting is due to diabetic gastroparesis but more due to narcotic induced constipation. Patient had a bowel movement on 12/20.  Abdominal x-ray on 12/22 shows copious stool within the bowel.    Plan:  - Scheduled polyethylene glycol daily for constipation prevention  - Continue scheduled Reglan 10 mg IV every 6 hours     Diabetes type I (CMS-HCC)- (present on admission)   Assessment & Plan    - Poorly controlled, HbA1c 11.8. However, mother reports good compliance.  - Mother states patient was taking insulin but was feeling ill.  Per records, also may have possibly run out of rapid acting insulin due to insurance  problems.  - Multiple previous DKA admissions    Plan:  - Continue Lantus 30 units twice daily  - Continue sliding scale insulin coverage  - Hypoglycemia protocol  - Diabetes education prior to discharge     Normocytic anemia   Assessment & Plan    - Suspect hemodilutional    Plan:  -Continue to monitor     Dehydration- (present on admission)   Assessment & Plan    Improved  - Secondary to diabetic ketoacidosis and osmotic diuresis  - Tolerating oral diet

## 2018-12-28 NOTE — DIETARY
Nutrition services: Tube feeding discontinued and DM GI soft diet started.  Pt to be seen daily by nutrition representative for meal preferences. Pt noted to be taking % of last two meals since tube feeding discontinued.     Recommendations:  1. encourage intake of meals   2. document intake of all meals and supplements as % taken in ADL's to provide interdisciplinary communication across all shifts   3. monitor daily weights  4. if intake is less than 50% of most meals consider adding supplements with meals

## 2018-12-29 ENCOUNTER — APPOINTMENT (OUTPATIENT)
Dept: RADIOLOGY | Facility: MEDICAL CENTER | Age: 28
DRG: 637 | End: 2018-12-29
Attending: STUDENT IN AN ORGANIZED HEALTH CARE EDUCATION/TRAINING PROGRAM
Payer: MEDICAID

## 2018-12-29 VITALS
SYSTOLIC BLOOD PRESSURE: 121 MMHG | TEMPERATURE: 97.7 F | OXYGEN SATURATION: 100 % | WEIGHT: 175.27 LBS | HEART RATE: 99 BPM | HEIGHT: 65 IN | BODY MASS INDEX: 29.2 KG/M2 | DIASTOLIC BLOOD PRESSURE: 92 MMHG | RESPIRATION RATE: 16 BRPM

## 2018-12-29 LAB
ANION GAP SERPL CALC-SCNC: 8 MMOL/L (ref 0–11.9)
BASOPHILS # BLD AUTO: 0.6 % (ref 0–1.8)
BASOPHILS # BLD: 0.04 K/UL (ref 0–0.12)
BUN SERPL-MCNC: 10 MG/DL (ref 8–22)
CALCIUM SERPL-MCNC: 9.1 MG/DL (ref 8.5–10.5)
CHLORIDE SERPL-SCNC: 106 MMOL/L (ref 96–112)
CO2 SERPL-SCNC: 23 MMOL/L (ref 20–33)
CREAT SERPL-MCNC: 0.43 MG/DL (ref 0.5–1.4)
EOSINOPHIL # BLD AUTO: 0.24 K/UL (ref 0–0.51)
EOSINOPHIL NFR BLD: 3.7 % (ref 0–6.9)
ERYTHROCYTE [DISTWIDTH] IN BLOOD BY AUTOMATED COUNT: 41.4 FL (ref 35.9–50)
GLUCOSE BLD-MCNC: 130 MG/DL (ref 65–99)
GLUCOSE BLD-MCNC: 179 MG/DL (ref 65–99)
GLUCOSE BLD-MCNC: 79 MG/DL (ref 65–99)
GLUCOSE SERPL-MCNC: 84 MG/DL (ref 65–99)
HCT VFR BLD AUTO: 39.5 % (ref 37–47)
HGB BLD-MCNC: 13.3 G/DL (ref 12–16)
IMM GRANULOCYTES # BLD AUTO: 0.02 K/UL (ref 0–0.11)
IMM GRANULOCYTES NFR BLD AUTO: 0.3 % (ref 0–0.9)
LYMPHOCYTES # BLD AUTO: 2.48 K/UL (ref 1–4.8)
LYMPHOCYTES NFR BLD: 37.9 % (ref 22–41)
MCH RBC QN AUTO: 29.2 PG (ref 27–33)
MCHC RBC AUTO-ENTMCNC: 33.7 G/DL (ref 33.6–35)
MCV RBC AUTO: 86.8 FL (ref 81.4–97.8)
MONOCYTES # BLD AUTO: 0.82 K/UL (ref 0–0.85)
MONOCYTES NFR BLD AUTO: 12.5 % (ref 0–13.4)
NEUTROPHILS # BLD AUTO: 2.95 K/UL (ref 2–7.15)
NEUTROPHILS NFR BLD: 45 % (ref 44–72)
NRBC # BLD AUTO: 0 K/UL
NRBC BLD-RTO: 0 /100 WBC
PLATELET # BLD AUTO: 225 K/UL (ref 164–446)
PMV BLD AUTO: 10.1 FL (ref 9–12.9)
POTASSIUM SERPL-SCNC: 3.8 MMOL/L (ref 3.6–5.5)
RBC # BLD AUTO: 4.55 M/UL (ref 4.2–5.4)
SODIUM SERPL-SCNC: 137 MMOL/L (ref 135–145)
WBC # BLD AUTO: 6.6 K/UL (ref 4.8–10.8)

## 2018-12-29 PROCEDURE — A9270 NON-COVERED ITEM OR SERVICE: HCPCS | Performed by: STUDENT IN AN ORGANIZED HEALTH CARE EDUCATION/TRAINING PROGRAM

## 2018-12-29 PROCEDURE — 700102 HCHG RX REV CODE 250 W/ 637 OVERRIDE(OP): Performed by: STUDENT IN AN ORGANIZED HEALTH CARE EDUCATION/TRAINING PROGRAM

## 2018-12-29 PROCEDURE — A9541 TC99M SULFUR COLLOID: HCPCS

## 2018-12-29 PROCEDURE — 36415 COLL VENOUS BLD VENIPUNCTURE: CPT

## 2018-12-29 PROCEDURE — 99239 HOSP IP/OBS DSCHRG MGMT >30: CPT | Mod: GC | Performed by: INTERNAL MEDICINE

## 2018-12-29 PROCEDURE — 700102 HCHG RX REV CODE 250 W/ 637 OVERRIDE(OP): Performed by: INTERNAL MEDICINE

## 2018-12-29 PROCEDURE — 80048 BASIC METABOLIC PNL TOTAL CA: CPT

## 2018-12-29 PROCEDURE — A9270 NON-COVERED ITEM OR SERVICE: HCPCS | Performed by: INTERNAL MEDICINE

## 2018-12-29 PROCEDURE — 700111 HCHG RX REV CODE 636 W/ 250 OVERRIDE (IP): Performed by: INTERNAL MEDICINE

## 2018-12-29 PROCEDURE — 82962 GLUCOSE BLOOD TEST: CPT | Mod: 91

## 2018-12-29 PROCEDURE — 85025 COMPLETE CBC W/AUTO DIFF WBC: CPT

## 2018-12-29 RX ORDER — ONDANSETRON 4 MG/1
4 TABLET, ORALLY DISINTEGRATING ORAL EVERY 4 HOURS PRN
Qty: 10 TAB | Refills: 0 | Status: SHIPPED | OUTPATIENT
Start: 2018-12-29 | End: 2018-12-29

## 2018-12-29 RX ORDER — OMEPRAZOLE 20 MG/1
20 CAPSULE, DELAYED RELEASE ORAL DAILY
Qty: 30 CAP | Refills: 0 | Status: ON HOLD | OUTPATIENT
Start: 2018-12-30 | End: 2019-03-26

## 2018-12-29 RX ORDER — LOSARTAN POTASSIUM 50 MG/1
50 TABLET ORAL DAILY
Qty: 30 TAB | Refills: 0 | Status: SHIPPED | OUTPATIENT
Start: 2018-12-30 | End: 2018-12-29

## 2018-12-29 RX ORDER — OMEPRAZOLE 20 MG/1
20 CAPSULE, DELAYED RELEASE ORAL DAILY
Qty: 30 CAP | Refills: 0 | Status: SHIPPED | OUTPATIENT
Start: 2018-12-30 | End: 2018-12-29

## 2018-12-29 RX ORDER — GUAIFENESIN 600 MG/1
600 TABLET, EXTENDED RELEASE ORAL EVERY 12 HOURS
Qty: 28 TAB | Refills: 1 | Status: SHIPPED | OUTPATIENT
Start: 2018-12-29 | End: 2018-12-29

## 2018-12-29 RX ORDER — ONDANSETRON 4 MG/1
4 TABLET, ORALLY DISINTEGRATING ORAL EVERY 4 HOURS PRN
Qty: 10 TAB | Refills: 0 | Status: SHIPPED | OUTPATIENT
Start: 2018-12-29 | End: 2019-01-09 | Stop reason: CLARIF

## 2018-12-29 RX ORDER — LOSARTAN POTASSIUM 50 MG/1
50 TABLET ORAL DAILY
Qty: 30 TAB | Refills: 0 | Status: SHIPPED | OUTPATIENT
Start: 2018-12-30 | End: 2019-01-09 | Stop reason: CLARIF

## 2018-12-29 RX ADMIN — INSULIN GLARGINE 30 UNITS: 100 INJECTION, SOLUTION SUBCUTANEOUS at 09:00

## 2018-12-29 RX ADMIN — LOSARTAN POTASSIUM 50 MG: 50 TABLET ORAL at 05:47

## 2018-12-29 RX ADMIN — OMEPRAZOLE 20 MG: 20 CAPSULE, DELAYED RELEASE ORAL at 05:46

## 2018-12-29 RX ADMIN — STANDARDIZED SENNA CONCENTRATE AND DOCUSATE SODIUM 2 TABLET: 8.6; 5 TABLET, FILM COATED ORAL at 09:03

## 2018-12-29 RX ADMIN — ENOXAPARIN SODIUM 40 MG: 100 INJECTION SUBCUTANEOUS at 05:47

## 2018-12-29 RX ADMIN — GUAIFENESIN 600 MG: 600 TABLET, EXTENDED RELEASE ORAL at 05:47

## 2018-12-29 RX ADMIN — ATORVASTATIN CALCIUM 20 MG: 20 TABLET, FILM COATED ORAL at 05:46

## 2018-12-29 RX ADMIN — METOCLOPRAMIDE 10 MG: 10 TABLET ORAL at 05:47

## 2018-12-29 RX ADMIN — POLYETHYLENE GLYCOL 3350 1 PACKET: 17 POWDER, FOR SOLUTION ORAL at 09:04

## 2018-12-29 ASSESSMENT — PAIN SCALES - GENERAL: PAINLEVEL_OUTOF10: 0

## 2018-12-29 NOTE — CARE PLAN
Problem: Safety  Goal: Will remain free from falls  Outcome: PROGRESSING AS EXPECTED  Pt educated on need to call before getting out of bed. Pt verbalizes understanding. Treaded socks on pt. Bed locked. DME in bathroom. Room safety check. Bed in lowest position. Pt calls for help appropriately on call light. Call light with in reach. Hourly rounding in place.        Problem: Bowel/Gastric:  Goal: Normal bowel function is maintained or improved  Outcome: PROGRESSING SLOWER THAN EXPECTED  Pt had a bowel movement 12/26. Goal for pt to have daily bowel movement. Pt declining bowel protocol at this time despite education. Pt reports bowel motility within baseline. Bowel sounds normoactive in all four quadrants.

## 2018-12-29 NOTE — CONSULTS
"Patient is 27 yo female seen multiple times by diabetes educator during her many admissions to the hospital.   Issue is not a lack of knowledge.   Last seen by educator on 12/19/18    Marline Cameron R.N.      []Hide copied text  Diabetes education: Met with Alis, who was sleeping but woke when name called without problem. Pt is known from previous admits.  Some question regarding pt having fast acting insulin at home. Pt's insurance changed from Apidra to Admelog and pt did not have a new prescription.   Plan: Pt has had education related to diabetes multiple times. Pt was open to discussing needs at this visit.  Pt will prescriptions for Admelog solostar pens ( box of five), and strips and lancets for True metrix to test four times a day. All prescriptions need to have dx code (E10.65), directions, quantity and refills for Medicaid to cover. Pt states she has Basaglar, and pen needles at home. Please call [phone] if needs change.           Previous visit:      Progress Notes  Date of Service: 6/28/2018 7:05 PM  Marline Cameron R.N.         Diabetes education: met with patient and mom to review diabetes management. Pt states she has all her supplies and insulin at home. Reviewed insulin pen, storage, shelf life and site rotation. Pt states she is feeling better and is eating now.  Pt hopes to go home tomorrow. Pt has type one diabetes and now that she is eating should be on both a meal bolus as well as a correction for her blood sugars. Pt uses a correction and a 1: 6 carb ratio at home. Finger sticks should be ac and hs (not every six hours) and if possible limit fast acting insulin at hs. Please call [phone] if needs change.           Consults  Date of Service: 6/21/2018 8:27 PM  Marline Cameron R.N.         Diabetes education: Pt known from previous admits. Pt has not yet been seen as pt continues to have nausea and GI recommendation is to have \"dark quiet room without frequent disturbance of " "patient\".  Plan: Alana CHAVIRA CDE to meet with patient tomorrow if appropriate. Please call [phone] if needs change.           Consults  Date of Service: 11/3/2015 1:37 PM  Marline Cameron R.N.      Diabetes education: Alis has hx of type one diabetes, and is known from previous admits:                          Consults by Marline Cameron R.N. at 9/8/2015 3:59 PM        Author: Marline Cameron R.N. Service: (none) Author Type: Diabetes Health Educator       Filed: 9/8/2015 4:03 PM Note Time: 9/8/2015 3:59 PM Status: Signed       : Marline Cameron R.N. (Diabetes Health Educator)           Expand All Collapse All         Diabetes education: Pt is well known from previous admits:                     Consults by Mariah Fernández R.N. at 11/25/2014 11:22 AM      Author: Mariah Fernández R.N.  Service: (none)  Author Type: Registered Nurse      Filed: 11/25/2014 11:28 AM  Note Time: 11/25/2014 11:22 AM  Status: Signed      : Mariah Fernández R.N. (Registered Nurse)          Expand All Collapse All   Diabetes Education: Reviewed sick day management with Alis. She has a new meter at home and has seen the urine ketone strips before. We reviewed checking her urine ketones when she is sick or if her blood sugar is over 300. She has an insulin correction of 1:50>150 and verbalized understanding of correcting for ketones. She was encouraged to make up a sick day box and have every thing she needs in it including her sick day instructions. She states she has a prescription for Zofran if she gets nauseated. She was provided written instructions to refer to. She states she gets insurance January 1, 2015 and will get a doctor. She states she understands how hard it is on her to keep going into DKA and wants to start taking better care of herself.                                Consults by Marline Cameron R.N. at 11/24/2014 5:18 PM      Author: Marline Cameron R.N.  Service: (none)  Author Type: Diabetes Health Educator      " "Filed: 11/24/2014 5:23 PM  Note Time: 11/24/2014 5:18 PM  Status: Signed      : Marline Cameron R.N. (Diabetes Health Educator)          Expand All Collapse All   Diabetes education: Alis well known to this CDE from previous admits. Issues were supplies but with last admit, she reports she has insulin and strips. Handouts were given to her regarding [employer], and [place] [employer]. Please refer to consult and progress notes for 11/9/14 admission (specifically 11/13/14).  She states this time her blood sugars were 293 prior to admit but increased to over 700 when she \"got here\".  She states they normally are not that high. Asked about testing for ketones and adding more insulin and she states she was never taught that.  Pt was just receiving medication for nausea. Will defer education until tomorrow or Wednesday.      Plan: discuss ketones, insulin and DKA prior to discharge when patient more appropriate. Please call [phone] if needs change.             Plan: Pt has just been moved to  and is currently sleeping. Questions regarding insulin coverage from  note. Mom not available. Pt was on Novolog and Lantus, but her insurance covers Apidra. There was some question about prior auth. Not sure if she cannot tolerate Apidra or did not know to have it changed. CDE will follow up with her tomorrow. Please call [phone] if needs change.                                                Author: Marline Cameron R.N. Service: (none) Author Type: Diabetes Health Educator       Filed: 9/9/2015 1:33 PM Note Time: 9/9/2015 1:17 PM Status: Signed     : Marline Cameron R.N. (Diabetes Health Educator)         Expand All Collapse All   Diabetes education: Met with Alis regarding insulin needs. Pt currently has [employer] which only covers Lantus and Apidra insulin. Pt states she was told by the [facility] pharmacy, they would cover Levemir and Novolog only for a short period of time and then would need a " "prior auth. Pt has used Lantus in the hospital without problems (currently on 15 units every 12 hours). She has never used Apidra (which has more of a \"tale\", and may work better for her gastroparesis).  Pt is struggling with eating. She took her insulin, ate breakfast well, and then threw up. Pt has not received any Humalog coverage since yesterday at 1658. She normally follows a 1:6 carbohydrate ratio with a correction of 1 unit for every 50 mg/dl above 150 (scale starting at 200) for meals only. She states she was taking 25 units of Levermir in am and 22 units in pm. Taking much less here and still with lower blood sugars : : 64 ( 76,and 85 after treating the low) and 91. 9/8: 110, 184, 221 and 169. Pt may benefit with decreasing pm Lantus and when able to tolerate food changing sliding scale coverage to meals only, starting at 200 and adding carbohydrate ratio of 1:6 again meals only.   Plan: As above as well as at discharge patient will need a prescription for Apidra and Lantus insulins to get coverage from her current insurance (pt will be changing medicaid in October). Please call [phone] if needs change.                 Pt is now on Medicaid HMO not [employer]. Not sure what are her concerns this admit, as she is sleeping. Pt is getting 25 units of Lantus HS and Humalog sliding scale every six hours. Pt would benefit from having coverage ac and hs when she begins to eat . At this point she has not required any Humalo, 88 and 131.   Plan: CDE will follow up tomorrow. Please call [phone] if needs change.               Progress Notes  Date of Service: 2015 3:12 PM  Marline Cameron R.N.      Diabetes education: Met with Alis, who states she is feeling much better. Asked what happened, she states she was fine at work, came home, began to eat, and after first few bites became sick. States now with insurance she has her insulin (pens) and supplies and takes her insulin, checks her blood sugar, checks " for ketones when sick, rotates sites, primes pen, holds at sight for 10 seconds, and does not use pen longer than 28 days.  Reviewed sick day, she knows to come in if not able to keep fluid down ( was able at first than later not ).  Pt will be setting up an appointment with an endocrinologist (had to clear up coverage with insurance).  Questions answered .  Please call [phone] if needs change.                                                ED to Hosp-Admission (Current) on 12/18/2018            Routing History            Detailed Report

## 2018-12-29 NOTE — DISCHARGE SUMMARY
Internal Medicine Discharge Summary  Note Author: Kobi Gómez M.D.       Name Alis Sparks     1990   Age/Sex 28 y.o. female   MRN [id number]         Admit Date:  2018       Discharge Date:   18    Service:   [facility] Internal Medicine [facility]  Team  Attending Physician(s):   Dr Atkins        Senior Resident(s):   Dalia  [place] Resident(s):  Wendi  PCP: Navi Huber M.D.      Primary Diagnosis:   DKA     Secondary Diagnoses:                Principal Problem:    Intractable nausea and vomiting POA: Yes  Active Problems:    Diabetic ketoacidosis (HCC) POA: Yes    Pneumonia POA: Yes    Diabetes type I (CMS-HCC) POA: Yes    Gastroparesis POA: Yes    Hypokalemia POA: Yes    Dehydration POA: Yes    Difficult intravenous access POA: Yes    Normocytic anemia POA: No    Flank pain POA: Yes    Leukocytosis POA: Yes  Resolved Problems:    * No resolved hospital problems. *      Hospital Summary (Brief Narrative):       Ms. Sparks is a pleasant 28-year-old female, who is initially admitted to the ICU on 2018 for diabetic ketoacidosis in the setting of poorly controlled diabetes mellitus type 1 (hemoglobin A1c 11.1 on November 10, 2018).  Her history is remarkable for peripheral neuropathy, autonomic dysfunction, and nephropathy.  Notably, she had a gastric emptying study performed on 2014 that was normal.  Patient was placed on an insulin drip with copious IV fluids following admission to the ICU.  She was transitioned off of the insulin drip and placed on subcutaneous glargine insulin 25 units daily in addition to insulin per sign scale prior to transfer to the medical floor on 2018.  While on the floor, patient continued to have nausea and vomiting refractory to multiple antiemetics and was therefore, not able to demonstrate any significant oral intake.  Due to increasing anion gap and low bicarbonate levels with tachycardia,  tachypnea, and elevated blood pressures, patient was transferred back to the ICU on December 23, 2018, after which she was placed back on an insulin drip with IV fluids.  Her labs and history were consistent with starvation ketosis with underlying mild diabetic ketoacidosis.  Due to inability to tolerate oral intake, a Cortrack was placed and started on tube feeds, which she tolerating with baseline intermittent nausea and vomiting.  Scheduled diphenhydramine was discontinued due to concern for its anticholinergic effects, including worsening constipation.  Patient reported not having a bowel movement since admission.  However, per nursing records, her last bowel movement was on December 20, 2018.  Abdominal x-ray on December 22, 2018 showed copious stool retention.  Therefore, patient was given magnesium citrate as well as a Dulcolax suppository followed by multiple bowel movements resulting in improvement of her nausea and vomiting.  She was placed on polyethylene glycol daily for bowel regimen.  Morphine IV was discontinued in lieu of IV ketorolac for persistent abdominal pain to decrease risk of constipation.  Her tube feeds were increased slowly.  Gastric emptying study was ordered but could not be performed due to patient's inability to maintain oral intake.  Patient was transitioned off of insulin drip on December 25, 2018.  She was placed back on her home glargine insulin regimen of 30 units subcutaneously twice daily on December 26, 2018 and was maintained on minimal IV fluids with D10 1/2 NS at 50 mL/h.   Patient was transferred back to [facility] when stabilized.     NM scan was done to r/o gastroparesis. Will be discharged in stable condition     Patient /Hospital Summary (Details -- Problem Oriented) :          Pneumonia   Assessment & Plan    Resolved   - Continue guaifenesin as needed for cough     Diabetic ketoacidosis (HCC)   Assessment & Plan    Resolved       * Intractable nausea and vomiting      Assessment & Plan    improved  - Gastric emptying study ordered, and results pending      Hypokalemia   Assessment & Plan    Resolved      Gastroparesis   Assessment & Plan    NM scan pending      Diabetes type I (CMS-[facility])   Assessment & Plan      - Continue Lantus 30 units twice daily  - Diabetes education prior to discharge  -continue short acting insulin  -check blood glucose three times a day   -PCP follow up  -follow up endocrinology      Normocytic anemia   Assessment & Plan    - Suspect hemodilutional  -follow pcp      Dehydration   Assessment & Plan    Resolved          Consultants:     Pulmonary critical care    Procedures:        None    Imaging/ Testing:      DX-ABDOMEN FOR TUBE PLACEMENT   Final Result      Cortrak feeding tube tip projects in the region of the duodenal bulb.      TD-NNEWLDV-5 VIEW   Final Result         No specific finding to suggest small bowel obstruction.      CT-RENAL COLIC EVALUATION(A/P W/O)   Final Result      Minimal prominence of the renal collecting systems is likely related to bladder distention.      Trace free fluid in the abdomen and pelvis.      There may be mild wall thickening of the ascending and transverse colon. This may be related to hypoproteinemia or may be infectious/inflammatory.      Moderate amount of colonic stool.      Small bilateral pleural effusions. Patchy bilateral lower lobe airspace opacities may be infectious/inflammatory.      Subcutaneous stranding in the anterior abdominal wall may be posttraumatic.      Nonvisualization of the appendix, limiting evaluation.         US-RENAL   Final Result      1.  Moderate RIGHT hydronephrosis.   2.  Unremarkable LEFT kidney.      DX-CHEST-PORTABLE (1 VIEW)   Final Result      Right central line projects over the superior cavoatrial junction.      No acute cardiopulmonary process is identified.         IR-MIDLINE CATHETER INSERTION >5 YRS    (Results Pending)   NM-GASTRIC EMPTYING    (Results Pending)          Discharge Medications:         Medication Reconciliation: Completed       Medication List      ASK your doctor about these medications      Instructions   atorvastatin 20 MG Tabs  Commonly known as:  LIPITOR   Take 1 Tab by mouth every day.  Dose:  20 mg     BASAGLAR KWIKPEN 100 UNIT/ML Sopn   Inject 32 Units as instructed 2 Times a Day.  Dose:  32 Units     Cholecalciferol 2000 UNIT Caps   Take 2,000 Units by mouth every day.  Dose:  2000 Units     ferrous sulfate 325 (65 Fe) MG tablet   Take 325 mg by mouth every day.  Dose:  325 mg     insulin glulisine 100 UNIT/ML Sopn injection  Commonly known as:  APIDRA   Inject 2-20 Units as instructed 3 times a day, with meals. FSBS base 150 For every 50 increase in blood sugar insulin doubles 200 = 2 units 250 = 4 units 300 = 8 units 350 =  16 units 400 = 32 units  Dose:  2-20 Units     metoclopramide 10 MG/10ML Soln  Commonly known as:  REGLAN   Take 10 mg by mouth 3 times a day before meals.  Dose:  10 mg     OMEGA 3 PO   Take 1 Dose by mouth every day. Unknown OTC Strength  Dose:  1 Dose                Disposition:   Going home    Diet:   Diabetic diet    Activity:   As tolerated    Instructions:        The patient was instructed to return to the ER in the event of worsening symptoms. I have counseled the patient on the importance of compliance and the patient has agreed to proceed with all medical recommendations and follow up plan indicated above.   The patient understands that all medications come with benefits and risks. Risks may include permanent injury or death and these risks can be minimized with close reassessment and monitoring.        Primary Care Provider:      Discharge summary faxed to primary care provider:  Deferred  Copy of discharge summary given to the patient: Deferred      Follow up appointment details :      PCP and endocrine follow-up    Pending Studies:        Nuclear medicine scan for gastroparesis    Time spent on discharge day patient  visit, preparing discharge paperwork and arranging for patient follow up.    Summary of follow up issues:   PCP follow-up  Endocrine follow-up for management of diabetes  Insulin management    Discharge Time (Minutes) :   >45mins   Hospital Course Type:  Inpatient Stay >2 midnights      Condition on Discharge    ______________________________________________________________________    Interval history/exam for day of discharge:    No active pulmonary issues  No fever chills, significantly improved nausea vomiting  Physical exam:  Constitutional: She is oriented to person, place, and time and well-developed, well-nourished, and in no distress.   HENT:   Head: Normocephalic and atraumatic.   Eyes: Pupils are equal, round, and reactive to light. No scleral icterus.   Neck: Normal range of motion. Neck supple.   Cardiovascular: Normal rate, regular rhythm and normal heart sounds.    Pulmonary/Chest: Effort normal and breath sounds normal. No respiratory distress.   Abdominal: Soft. Bowel sounds are normal. She exhibits no distension. There is no tenderness.   Musculoskeletal: Normal range of motion. She exhibits no edema.   Neurological: She is alert and oriented to person, place, and time.   Skin: Skin is warm. No erythema.   Psychiatric: Mood and affect normal.        Most Recent Labs:    Lab Results   Component Value Date/Time    WBC 6.6 12/29/2018 05:43 AM    RBC 4.55 12/29/2018 05:43 AM    HEMOGLOBIN 13.3 12/29/2018 05:43 AM    HEMATOCRIT 39.5 12/29/2018 05:43 AM    MCV 86.8 12/29/2018 05:43 AM    MCH 29.2 12/29/2018 05:43 AM    MCHC 33.7 12/29/2018 05:43 AM    MPV 10.1 12/29/2018 05:43 AM    NEUTSPOLYS 45.00 12/29/2018 05:43 AM    LYMPHOCYTES 37.90 12/29/2018 05:43 AM    MONOCYTES 12.50 12/29/2018 05:43 AM    EOSINOPHILS 3.70 12/29/2018 05:43 AM    BASOPHILS 0.60 12/29/2018 05:43 AM    HYPOCHROMIA 1+ 09/07/2014 03:00 PM    ANISOCYTOSIS 2+ 11/09/2017 12:11 AM      Lab Results   Component Value Date/Time    SODIUM  137 12/29/2018 05:43 AM    POTASSIUM 3.8 12/29/2018 05:43 AM    CHLORIDE 106 12/29/2018 05:43 AM    CO2 23 12/29/2018 05:43 AM    GLUCOSE 84 12/29/2018 05:43 AM    BUN 10 12/29/2018 05:43 AM    CREATININE 0.43 (L) 12/29/2018 05:43 AM      Lab Results   Component Value Date/Time    ALTSGPT 18 12/28/2018 01:48 AM    ASTSGOT 18 12/28/2018 01:48 AM    ALKPHOSPHAT 91 12/28/2018 01:48 AM    TBILIRUBIN 0.5 12/28/2018 01:48 AM    DBILIRUBIN 0.1 08/17/2011 03:00 AM    LIPASE 32 12/18/2018 05:00 PM    ALBUMIN 3.5 12/28/2018 01:48 AM    GLOBULIN 1.9 12/28/2018 01:48 AM    PREALBUMIN 15.0 (L) 12/25/2018 01:42 AM    INR 1.20 (H) 04/14/2015 10:10 AM    MACROCYTOSIS 1+ 10/19/2013 07:45 AM     Lab Results   Component Value Date/Time    PROTHROMBTM 15.0 (H) 04/14/2015 10:10 AM    INR 1.20 (H) 04/14/2015 10:10 AM

## 2018-12-29 NOTE — CARE PLAN
Problem: Knowledge Deficit  Goal: Knowledge of disease process/condition, treatment plan, diagnostic tests, and medications will improve    Intervention: Explain information regarding disease process/condition, treatment plan, diagnostic tests, and medications and document in education  Discussed increased in losartan medication, every 4 hours vitals in place, bloo

## 2018-12-30 NOTE — CARE PLAN
Problem: Communication  Goal: The ability to communicate needs accurately and effectively will improve  Outcome: PROGRESSING AS EXPECTED  Patient communicates clearly.     Problem: Safety  Goal: Will remain free from injury  Outcome: PROGRESSING AS EXPECTED  Patient ambulates steadily.

## 2018-12-30 NOTE — PROGRESS NOTES
"Blood pressure 121/92, pulse 99, temperature 36.5 °C (97.7 °F), temperature source Temporal, resp. rate 16, height 1.651 m (5' 5\"), weight 79.5 kg (175 lb 4.3 oz), last menstrual period 11/18/2018, SpO2 100 %, not currently breastfeeding.    Patient discharged home with her mother.  RX scripts given, discharge instructions given.  All questions answered, signatures done on paper chart copy of discharge instructions.  Patient will follow up with primary MD and her endocrinologist.  Vaccines already up to date.     "

## 2018-12-30 NOTE — DISCHARGE INSTRUCTIONS
Discharge Instructions    Discharged to home by car with relative. Discharged via wheelchair, hospital escort: Yes.  Special equipment needed: Not Applicable    Be sure to schedule a follow-up appointment with your primary care doctor or any specialists as instructed.     Discharge Plan:   Diet Plan: Discussed  Activity Level: Discussed  Confirmed Follow up Appointment: Appointment Scheduled  Confirmed Symptoms Management: Discussed  Medication Reconciliation Updated: Yes  Influenza Vaccine Indication: Indicated: 9 to 64 years of age  Influenza Vaccine Given - only chart on this line when given: Influenza Vaccine Given (See MAR)    I understand that a diet low in cholesterol, fat, and sodium is recommended for good health. Unless I have been given specific instructions below for another diet, I accept this instruction as my diet prescription.   Other diet: Diabetic     Special Instructions: None    · Is patient discharged on Warfarin / Coumadin?   No     2000 Calorie Diabetic Diet  The 2000 calorie diabetic diet is designed for eating up to 2000 calories each day. Following this diet and making healthy meal choices can help improve overall health. It controls blood glucose (sugar) levels. It can also lower blood pressure and cholesterol.  SERVING SIZES  Measuring foods and serving sizes helps to make sure you are getting the right amount of food. The list below tells how big or small some common serving sizes are.  · 1 oz.........4 stacked dice.   · 3 oz.........Deck of cards.   · 1 tsp........Tip of little finger.   · 1 tbs........Thumb.   · 2 tbs........Golf ball.   · ½ cup.......Half of a fist.   · 1 cup........A fist.   GUIDELINES FOR CHOOSING FOODS  The goal of this diet is to eat a variety of foods and limit calories to 2000 each day. This can be done by choosing foods that are low in calories and fat. The diet also suggests eating small amounts of food often. Doing this helps control your blood glucose levels  so they do not get too high or too low. Each meal or snack should contain a protein food source to help you feel more satisfied and to stabilize your blood glucose. Try to eat about the same amount of food around the same time each day. This includes weekend days, travel days, and days off work. Space your meals about 4 to 5 hours apart and add a snack between them if you wish.  For example, a daily food plan could include breakfast, a morning snack, lunch, dinner, and an evening snack. Healthy meals and snacks include whole grains, vegetables, fruits, lean meats, poultry, fish, and dairy products. As you plan your meals, choose a variety of foods. Choose from the bread and starches, vegetables, fruit, dairy, and meat/protein groups. Examples of foods from each group are listed below with their suggested serving sizes. Use measuring cups and spoons to become familiar with what a healthy portion looks like.  Bread and Starches  Each serving equals 15 grams of carbohydrates.  · 1 slice bread.   · ¼ bagel.   · ¾ cup or 1 cup cold cereal (unsweetened).   · ½ cup hot cereal or mashed potatoes.   · 1 small potato (size of a computer mouse).   ·  cup cooked pasta or rice.   · ½ English muffin.   · 1 cup broth-based soup.   · 3 cups popcorn.   · 4 to 6 whole-wheat crackers.   · ½ cup cooked beans, peas, or corn.   Vegetables  Each serving equals 5 grams of carbohydrates.  · ½ cup cooked vegetables.   · 1 cup raw vegetables.   · ½ cup tomato juice.   Fruit  Each serving equals 15 grams of carbohydrates.  · 1 small apple, banana, or orange.   · 1 ¼ cup watermelon or strawberries.   · ½ cup applesauce (no sugar added).   · 2 tbs raisins.   · ½ banana.   · ½ cup unsweetened canned fruit.   · ½ cup unsweetened fruit juice.   Dairy  Each serving equals 12 to 15 grams of carbohydrates.  · 1 cup fat-free milk.   · 6 oz artificially sweetened yogurt.   · 1 cup buttermilk.   · 1 cup soy milk.   Meat/Protein  · 1 large egg.   · 2 to 3  oz meat, poultry, or fish.   · ½ cup cottage cheese.   · 1 tbs peanut butter.   · ½ cup tofu.   · 1 oz cheese.   · ¼ cup tuna canned in water.   SAMPLE 2000 CALORIE DIET PLAN  Breakfast  · 1 English muffin (2 carb servings).   · Reduced fat cream cheese, 1 tbs.   · 1 scrambled egg.   · ½ grapefruit (1 carb serving).   · Fat-free milk, 1 cup (1 carb serving).   Morning Snack  · Artificially sweetened yogurt, 6 oz (1 carb serving).   · 2 tbs chopped nuts.   · 1 small peach (1 carb serving).   Lunch  · Grilled chicken sandwich.   · 1 hamburger bun (2 carb servings).   · 2 oz chicken breast.   · 1 lettuce leaf.   · 2 slices tomato.   · Reduced fat mayonnaise, 1 tbs.   · Carrot sticks, 1 cup.   · Celery, 1 cup.   · 1 small apple (1 carb serving).   · Fat-free milk, 1 cup (1 carb serving).   Afternoon Snack  · ½ cup low-fat cottage cheese.   · 1 ¼ cups strawberries (1 carb serving).   Dinner  · Steak fajitas.   · 2 oz lean steak.   · 1 whole-wheat tortilla, 8 inches (1 ½ carb servings).   · Shredded lettuce, ¼ cup.   · 2 slices tomato.   · Salsa, ¼ cup.   · Low-fat sour cream, 2 tbs.   · Brown rice,  cup (1 carb serving).   · 1 small orange (1 carb serving).   Evening Snack  · 4 reduced fat whole-wheat crackers (1 carb serving).   · 1 tbs peanut butter.   · 12 to 15 grapes (1 carb serving).   MEAL PLAN  Use this worksheet to help you make a daily meal plan based on the 2000 calorie diabetic diet suggestions. The total amount of carbohydrates in your meal or snack is more important than making sure you include all of the food groups at every meal or snack. If you are using this plan to help you control your blood glucose, you may interchange carbohydrate containing foods (dairy, starches, and fruits). Choose a variety of fresh foods of varying colors and flavors. You can choose from the following foods to build your day's meals:  · 11 Starches.   · 4 Vegetables.   · 3 Fruits.   · 3 Dairy.   · 8 oz Meat.   · Up to 6 Fats.    Your dietician can use this worksheet to help you decide how many servings and what types of foods are right for you.  BREAKFAST  Food Group and Servings / Food Choice  Starches ___________________________________________  Dairy ______________________________________________  Fruit ______________________________________________  Meat ______________________________________________  Fat________________________________________________  LUNCH  Food Group and Servings / Food Choice  Starch _____________________________________________  Meat ______________________________________________  Vegetables _________________________________________  Fruit ______________________________________________  Dairy______________________________________________  Fat________________________________________________  AFTERNOON SNACK  Food Group and Servings / Food Choice  Starch________________________________________________  Meat_________________________________________________  Fruit__________________________________________________  DINNER  Food Group and Servings / Food Choice  Starches ____________________________________________  Meat _______________________________________________  Dairy _______________________________________________  Vegetables __________________________________________  Fruit ________________________________________________  Fat_________________________________________________  EVENING SNACK  Food Group and Servings / Food Choice  Fruit _______________________________________________  Meat _______________________________________________  Starch ______________________________________________  DAILY TOTALS  Starches ________________________  Vegetables ______________________  Fruit ___________________________  Dairy ___________________________  Meat ___________________________  Fat _____________________________  Document Released: 07/10/2006 Document Revised: 03/11/2013 Document Reviewed: 07/26/2010  ExitCare®  Patient Information ©2013 [employer] [facility].    Diabetic Ketoacidosis  Diabetic ketoacidosis is a life-threatening complication of diabetes. If it is not treated, it can cause severe dehydration and organ damage and can lead to a coma or death.  What are the causes?  This condition develops when there is not enough of the hormone insulin in the body. Insulin helps the body to break down sugar for energy. Without insulin, the body cannot break down sugar, so it breaks down fats instead. This leads to the production of acids that are called ketones. Ketones are poisonous at high levels.  This condition can be triggered by:  · Stress on the body that is brought on by an illness.  · Medicines that raise blood glucose levels.  · Not taking diabetes medicine.  What are the signs or symptoms?  Symptoms of this condition include:  · Fatigue.  · Weight loss.  · Excessive thirst.  · Light-headedness.  · Fruity or sweet-smelling breath.  · Excessive urination.  · Vision changes.  · Confusion or irritability.  · Nausea.  · Vomiting.  · Rapid breathing.  · Abdominal pain.  · Feeling flushed.  How is this diagnosed?  This condition is diagnosed based on a medical history, a physical exam, and blood tests. You may also have a urine test that checks for ketones.  How is this treated?  This condition may be treated with:  · Fluid replacement. This may be done to correct dehydration.  · Insulin injections. These may be given through the skin or through an IV tube.  · Electrolyte replacement. Electrolytes, such as potassium and sodium, may be given in pill form or through an IV tube.  · Antibiotic medicines. These may be prescribed if your condition was caused by an infection.  Follow these instructions at home:  Eating and drinking  · Drink enough fluids to keep your urine clear or pale yellow.  · If you cannot eat, alternate between drinking fluids with sugar (such as juice) and salty fluids (such as broth or bouillon).  · If you can  eat, follow your usual diet and drink sugar-free liquids, such as water.  Other Instructions   · Take insulin as directed by your health care provider. Do not skip insulin injections. Do not use  insulin.  · If your blood sugar is over 240 mg/dL, monitor your urine ketones every 4-6 hours.  · If you were prescribed an antibiotic medicine, finish all of it even if you start to feel better.  · Rest and exercise only as directed by your health care provider.  · If you get sick, call your health care provider and begin treatment quickly. Your body often needs extra insulin to fight an illness.  · Check your blood glucose levels regularly. If your blood glucose is high, drink plenty of fluids. This helps to flush out ketones.  Contact a health care provider if:  · Your blood glucose level is too high or too low.  · You have ketones in your urine.  · You have a fever.  · You cannot eat.  · You cannot tolerate fluids.  · You have been vomiting for more than 2 hours.  · You continue to have symptoms of this condition.  · You develop new symptoms.  Get help right away if:  · Your blood glucose levels continue to be high (elevated).  · Your monitor reads “high” even when you are taking insulin.  · You faint.  · You have chest pain.  · You have trouble breathing.  · You have a sudden, severe headache.  · You have sudden weakness in one arm or one leg.  · You have sudden trouble speaking or swallowing.  · You have vomiting or diarrhea that gets worse after 3 hours.  · You feel severely fatigued.  · You have trouble thinking.  · You have abdominal pain.  · You are severely dehydrated. Symptoms of severe dehydration include:  ¨ Extreme thirst.  ¨ Dry mouth.  ¨ Blue lips.  ¨ Cold hands and feet.  ¨ Rapid breathing.  This information is not intended to replace advice given to you by your health care provider. Make sure you discuss any questions you have with your health care provider.  Document Released: 12/15/2001 Document  Revised: 05/25/2017 Document Reviewed: 11/25/2015  [employer] Interactive Patient Education © 2017 [employer]      Ondansetron oral dissolving tablet  What is this medicine?  ONDANSETRON (on DAN se reyes) is used to treat nausea and vomiting caused by chemotherapy. It is also used to prevent or treat nausea and vomiting after surgery.  This medicine may be used for other purposes; ask your health care provider or pharmacist if you have questions.  COMMON BRAND NAME(S): Zofran ODT  What should I tell my health care provider before I take this medicine?  They need to know if you have any of these conditions:  -heart disease  -history of irregular heartbeat  -liver disease  -low levels of magnesium or potassium in the blood  -an unusual or allergic reaction to ondansetron, granisetron, other medicines, foods, dyes, or preservatives  -pregnant or trying to get pregnant  -breast-feeding  How should I use this medicine?  These tablets are made to dissolve in the mouth. Do not try to push the tablet through the foil backing. With dry hands, peel away the foil backing and gently remove the tablet. Place the tablet in the mouth and allow it to dissolve, then swallow. While you may take these tablets with water, it is not necessary to do so.  Talk to your pediatrician regarding the use of this medicine in children. Special care may be needed.  Overdosage: If you think you have taken too much of this medicine contact a poison control center or emergency room at once.  NOTE: This medicine is only for you. Do not share this medicine with others.  What if I miss a dose?  If you miss a dose, take it as soon as you can. If it is almost time for your next dose, take only that dose. Do not take double or extra doses.  What may interact with this medicine?  Do not take this medicine with any of the following medications:  -apomorphine  -certain medicines for fungal infections like fluconazole, itraconazole, ketoconazole, posaconazole,  voriconazole  -cisapride  -dofetilide  -dronedarone  -pimozide  -thioridazine  -ziprasidone  This medicine may also interact with the following medications:  -carbamazepine  -certain medicines for depression, anxiety, or psychotic disturbances  -fentanyl  -linezolid  -MAOIs like Carbex, Eldepryl, Marplan, Nardil, and Parnate  -methylene blue (injected into a vein)  -other medicines that prolong the QT interval (cause an abnormal heart rhythm)  -phenytoin  -rifampicin  -tramadol  This list may not describe all possible interactions. Give your health care provider a list of all the medicines, herbs, non-prescription drugs, or dietary supplements you use. Also tell them if you smoke, drink alcohol, or use illegal drugs. Some items may interact with your medicine.  What should I watch for while using this medicine?  Check with your doctor or health care professional as soon as you can if you have any sign of an allergic reaction.  What side effects may I notice from receiving this medicine?  Side effects that you should report to your doctor or health care professional as soon as possible:  -allergic reactions like skin rash, itching or hives, swelling of the face, lips, or tongue  -breathing problems  -confusion  -dizziness  -fast or irregular heartbeat  -feeling faint or lightheaded, falls  -fever and chills  -loss of balance or coordination  -seizures  -sweating  -swelling of the hands and feet  -tightness in the chest  -tremors  -unusually weak or tired  Side effects that usually do not require medical attention (report to your doctor or health care professional if they continue or are bothersome):  -constipation or diarrhea  -headache  This list may not describe all possible side effects. Call your doctor for medical advice about side effects. You may report side effects to FDA at 1-990-FDA-5228.  Where should I keep my medicine?  Keep out of the reach of children.  Store between 2 and 30 degrees C (36 and 86  degrees F). Throw away any unused medicine after the expiration date.  NOTE: This sheet is a summary. It may not cover all possible information. If you have questions about this medicine, talk to your doctor, pharmacist, or health care provider.  © 2018 [employer] Standard (2014-09-24 16:21:52)      Losartan tablets  What is this medicine?  LOSARTAN (cj APOORVA tan) is used to treat high blood pressure and to reduce the risk of stroke in certain patients. This drug also slows the progression of kidney disease in patients with diabetes.  This medicine may be used for other purposes; ask your health care provider or pharmacist if you have questions.  COMMON BRAND NAME(S): Cozaar  What should I tell my health care provider before I take this medicine?  They need to know if you have any of these conditions:  -heart failure  -kidney or liver disease  -an unusual or allergic reaction to losartan, other medicines, foods, dyes, or preservatives  -pregnant or trying to get pregnant  -breast-feeding  How should I use this medicine?  Take this medicine by mouth with a glass of water. Follow the directions on the prescription label. This medicine can be taken with or without food. Take your doses at regular intervals. Do not take your medicine more often than directed.  Talk to your pediatrician regarding the use of this medicine in children. Special care may be needed.  Overdosage: If you think you have taken too much of this medicine contact a poison control center or emergency room at once.  NOTE: This medicine is only for you. Do not share this medicine with others.  What if I miss a dose?  If you miss a dose, take it as soon as you can. If it is almost time for your next dose, take only that dose. Do not take double or extra doses.  What may interact with this medicine?  -blood pressure medicines  -diuretics, especially triamterene, spironolactone, or amiloride  -fluconazole  -NSAIDs, medicines for pain and inflammation,  like ibuprofen or naproxen  -potassium salts or potassium supplements  -rifampin  This list may not describe all possible interactions. Give your health care provider a list of all the medicines, herbs, non-prescription drugs, or dietary supplements you use. Also tell them if you smoke, drink alcohol, or use illegal drugs. Some items may interact with your medicine.  What should I watch for while using this medicine?  Visit your doctor or health care professional for regular checks on your progress. Check your blood pressure as directed. Ask your doctor or health care professional what your blood pressure should be and when you should contact him or her. Call your doctor or health care professional if you notice an irregular or fast heart beat.  Women should inform their doctor if they wish to become pregnant or think they might be pregnant. There is a potential for serious side effects to an unborn child, particularly in the second or third trimester. Talk to your health care professional or pharmacist for more information.  You may get drowsy or dizzy. Do not drive, use machinery, or do anything that needs mental alertness until you know how this drug affects you. Do not stand or sit up quickly, especially if you are an older patient. This reduces the risk of dizzy or fainting spells. Alcohol can make you more drowsy and dizzy. Avoid alcoholic drinks.  Avoid salt substitutes unless you are told otherwise by your doctor or health care professional.  Do not treat yourself for coughs, colds, or pain while you are taking this medicine without asking your doctor or health care professional for advice. Some ingredients may increase your blood pressure.  What side effects may I notice from receiving this medicine?  Side effects that you should report to your doctor or health care professional as soon as possible:  -confusion, dizziness, light headedness or fainting spells  -decreased amount of urine passed  -difficulty  breathing or swallowing, hoarseness, or tightening of the throat  -fast or irregular heart beat, palpitations, or chest pain  -skin rash, itching  -swelling of your face, lips, tongue, hands, or feet  Side effects that usually do not require medical attention (report to your doctor or health care professional if they continue or are bothersome):  -cough  -decreased sexual function or desire  -headache  -nasal congestion or stuffiness  -nausea or stomach pain  -sore or cramping muscles  This list may not describe all possible side effects. Call your doctor for medical advice about side effects. You may report side effects to FDA at 6-699-QSH-2616.  Where should I keep my medicine?  Keep out of the reach of children.  Store at room temperature between 15 and 30 degrees C (59 and 86 degrees F). Protect from light. Keep container tightly closed. Throw away any unused medicine after the expiration date.  NOTE: This sheet is a summary. It may not cover all possible information. If you have questions about this medicine, talk to your doctor, pharmacist, or health care provider.  © 2018 [employer]/Gold Standard (2009-02-27 16:42:18)      Omeprazole tablets (OTC)  What is this medicine?  OMEPRAZOLE (oh ME pray zol) prevents the production of acid in the stomach. It is used to treat the symptoms of heartburn. You can buy this medicine without a prescription. This product is not for long-term use, unless otherwise directed by your doctor or health care professional.  This medicine may be used for other purposes; ask your health care provider or pharmacist if you have questions.  COMMON BRAND NAME(S): Prilosec OTC  What should I tell my health care provider before I take this medicine?  They need to know if you have any of these conditions:  -black or bloody stools  -chest pain  -difficulty swallowing  -have had heartburn for over 3 months  -have heartburn with dizziness, lightheadedness or sweating  -liver  disease  -lupus  -stomach pain  -unexplained weight loss  -vomiting with blood  -wheezing  -an unusual or allergic reaction to omeprazole, other medicines, foods, dyes, or preservatives  -pregnant or trying to get pregnant  -breast-feeding  How should I use this medicine?  Take this medicine by mouth. Follow the directions on the product label. If you are taking this medicine without a prescription, take one tablet every day. Do not use for longer than 14 days or repeat a course of treatment more often than every 4 months unless directed by a doctor or healthcare professional. Take your dose at regular intervals every 24 hours. Swallow the tablet whole with a drink of water. Do not crush, break or chew. This medicine works best if taken on an empty stomach 30 minutes before breakfast. If you are using this medicine with the prescription of your doctor or healthcare professional, follow the directions you were given. Do not take your medicine more often than directed.  Talk to your pediatrician regarding the use of this medicine in children. Special care may be needed.  Overdosage: If you think you have taken too much of this medicine contact a poison control center or emergency room at once.  NOTE: This medicine is only for you. Do not share this medicine with others.  What if I miss a dose?  If you miss a dose, take it as soon as you can. If it is almost time for your next dose, take only that dose. Do not take double or extra doses.  What may interact with this medicine?  Do not take this medicine with any of the following medications:  -atazanavir  -clopidogrel  -nelfinavir  This medicine may also interact with the following medications:  -ampicillin  -certain medicines for anxiety or sleep  -certain medicines that treat or prevent blood clots like warfarin  -cyclosporine  -diazepam  -digoxin  -disulfiram  -iron salts  -methotrexate  -mycophenolate mofetil  -phenytoin  -prescription medicine for fungal or yeast  infection like itraconazole, ketoconazole, voriconazole  -saquinavir  -tacrolimus  This list may not describe all possible interactions. Give your health care provider a list of all the medicines, herbs, non-prescription drugs, or dietary supplements you use. Also tell them if you smoke, drink alcohol, or use illegal drugs. Some items may interact with your medicine.  What should I watch for while using this medicine?  It can take several days before your heartburn gets better. Check with your doctor or health care professional if your condition does not start to get better, or if it gets worse.  Do not treat diarrhea with over the counter products. Contact your doctor if you have diarrhea that lasts more than 2 days or if it is severe and watery.  Do not treat yourself for heartburn with this medicine for more than 14 days in a row. You should only use this medicine for a 2-week treatment period once every 4 months. If your symptoms return shortly after your therapy is complete, or within the 4 month time frame, call your doctor or health care professional.  What side effects may I notice from receiving this medicine?  Side effects that you should report to your doctor or health care professional as soon as possible:  -allergic reactions like skin rash, itching or hives, swelling of the face, lips, or tongue  -bone, muscle or joint pain  -breathing problems  -chest pain or chest tightness  -dark yellow or brown urine  -diarrhea  -dizziness  -fast, irregular heartbeat  -feeling faint or lightheaded  -fever or sore throat  -muscle spasm  -palpitations  -rash on cheeks or arms that gets worse in the sun  -redness, blistering, peeling or loosening of the skin, including inside the mouth  -seizures  -tremors  -unusual bleeding or bruising  -unusually weak or tired  -yellowing of the eyes or skin  Side effects that usually do not require medical attention (report to your doctor or health care professional if they  continue or are bothersome):  -constipation  -dry mouth  -headache  -loose stools  -nausea  This list may not describe all possible side effects. Call your doctor for medical advice about side effects. You may report side effects to FDA at 4-071-FDA-7622.  Where should I keep my medicine?  Keep out of the reach of children.  Store at room temperature between 20 and 25 degrees C (68 and 77 degrees F). Protect from light and moisture. Throw away any unused medicine after the expiration date.  NOTE: This sheet is a summary. It may not cover all possible information. If you have questions about this medicine, talk to your doctor, pharmacist, or health care provider.  © 2018 [employer] (2017-01-19 13:06:31)      Depression / Suicide Risk    As you are discharged from this [facility] facility, it is important to learn how to keep safe from harming yourself.    Recognize the warning signs:  · Abrupt changes in personality, positive or negative- including increase in energy   · Giving away possessions  · Change in eating patterns- significant weight changes-  positive or negative  · Change in sleeping patterns- unable to sleep or sleeping all the time   · Unwillingness or inability to communicate  · Depression  · Unusual sadness, discouragement and loneliness  · Talk of wanting to die  · Neglect of personal appearance   · Rebelliousness- reckless behavior  · Withdrawal from people/activities they love  · Confusion- inability to concentrate     If you or a loved one observes any of these behaviors or has concerns about self-harm, here's what you can do:  · Talk about it- your feelings and reasons for harming yourself  · Remove any means that you might use to hurt yourself (examples: pills, rope, extension cords, firearm)  · Get professional help from the community (Mental Health, Substance Abuse, psychological counseling)  · Do not be alone:Call your Safe Contact- someone whom you trust who will be there for  you.  · Call your local CRISIS HOTLINE 213-7987 or 371-848-0649  · Call your local Children's Mobile Crisis Response Team [place] (660) 458-4600 or www.W-21  · Call the toll free National Suicide Prevention Hotlines   · National Suicide Prevention Lifeline 777-571-DEMQ (7217)  · National [employer] Line Network 800-SUICIDE (879-2867)

## 2019-01-01 ENCOUNTER — APPOINTMENT (OUTPATIENT)
Dept: RADIOLOGY | Facility: MEDICAL CENTER | Age: 29
DRG: 637 | End: 2019-01-01
Attending: INTERNAL MEDICINE
Payer: MEDICAID

## 2019-01-01 ENCOUNTER — APPOINTMENT (OUTPATIENT)
Dept: RADIOLOGY | Facility: MEDICAL CENTER | Age: 29
DRG: 638 | End: 2019-01-01
Attending: INTERNAL MEDICINE
Payer: MEDICAID

## 2019-01-01 ENCOUNTER — HOSPITAL ENCOUNTER (INPATIENT)
Facility: MEDICAL CENTER | Age: 29
LOS: 5 days | DRG: 637 | End: 2019-07-22
Attending: EMERGENCY MEDICINE | Admitting: HOSPITALIST
Payer: MEDICAID

## 2019-01-01 ENCOUNTER — APPOINTMENT (OUTPATIENT)
Dept: RADIOLOGY | Facility: MEDICAL CENTER | Age: 29
DRG: 637 | End: 2019-01-01
Attending: HOSPITALIST
Payer: MEDICAID

## 2019-01-01 ENCOUNTER — APPOINTMENT (OUTPATIENT)
Dept: RADIOLOGY | Facility: MEDICAL CENTER | Age: 29
DRG: 638 | End: 2019-01-01
Attending: EMERGENCY MEDICINE
Payer: MEDICAID

## 2019-01-01 ENCOUNTER — HOSPITAL ENCOUNTER (INPATIENT)
Facility: MEDICAL CENTER | Age: 29
LOS: 4 days | DRG: 638 | End: 2019-10-17
Attending: EMERGENCY MEDICINE | Admitting: INTERNAL MEDICINE
Payer: MEDICAID

## 2019-01-01 ENCOUNTER — PATIENT OUTREACH (OUTPATIENT)
Dept: HEALTH INFORMATION MANAGEMENT | Facility: OTHER | Age: 29
End: 2019-01-01

## 2019-01-01 ENCOUNTER — HOSPITAL ENCOUNTER (INPATIENT)
Facility: MEDICAL CENTER | Age: 29
LOS: 6 days | DRG: 638 | End: 2019-08-02
Attending: EMERGENCY MEDICINE | Admitting: INTERNAL MEDICINE
Payer: MEDICAID

## 2019-01-01 ENCOUNTER — TELEPHONE (OUTPATIENT)
Dept: HEALTH INFORMATION MANAGEMENT | Facility: OTHER | Age: 29
End: 2019-01-01

## 2019-01-01 ENCOUNTER — HOSPITAL ENCOUNTER (INPATIENT)
Facility: MEDICAL CENTER | Age: 29
LOS: 4 days | DRG: 637 | End: 2019-07-12
Attending: EMERGENCY MEDICINE | Admitting: INTERNAL MEDICINE
Payer: MEDICAID

## 2019-01-01 VITALS
BODY MASS INDEX: 29.09 KG/M2 | HEART RATE: 128 BPM | HEIGHT: 65 IN | RESPIRATION RATE: 16 BRPM | OXYGEN SATURATION: 97 % | TEMPERATURE: 97.8 F | WEIGHT: 174.6 LBS

## 2019-01-01 VITALS
TEMPERATURE: 97.9 F | HEIGHT: 66 IN | OXYGEN SATURATION: 97 % | RESPIRATION RATE: 20 BRPM | SYSTOLIC BLOOD PRESSURE: 150 MMHG | BODY MASS INDEX: 27.25 KG/M2 | WEIGHT: 169.53 LBS | DIASTOLIC BLOOD PRESSURE: 101 MMHG | HEART RATE: 89 BPM

## 2019-01-01 VITALS
HEART RATE: 89 BPM | BODY MASS INDEX: 26.45 KG/M2 | DIASTOLIC BLOOD PRESSURE: 89 MMHG | SYSTOLIC BLOOD PRESSURE: 131 MMHG | RESPIRATION RATE: 17 BRPM | HEIGHT: 65 IN | OXYGEN SATURATION: 98 % | TEMPERATURE: 96.9 F | WEIGHT: 158.73 LBS

## 2019-01-01 VITALS
RESPIRATION RATE: 22 BRPM | BODY MASS INDEX: 29.53 KG/M2 | TEMPERATURE: 98.6 F | SYSTOLIC BLOOD PRESSURE: 125 MMHG | HEIGHT: 65 IN | DIASTOLIC BLOOD PRESSURE: 75 MMHG | HEART RATE: 99 BPM | WEIGHT: 177.25 LBS | OXYGEN SATURATION: 97 %

## 2019-01-01 DIAGNOSIS — E10.10 TYPE 1 DIABETES MELLITUS WITH KETOACIDOSIS WITHOUT COMA (HCC): ICD-10-CM

## 2019-01-01 DIAGNOSIS — R11.2 NAUSEA AND VOMITING, INTRACTABILITY OF VOMITING NOT SPECIFIED, UNSPECIFIED VOMITING TYPE: ICD-10-CM

## 2019-01-01 DIAGNOSIS — E10.10 DIABETIC KETOACIDOSIS WITHOUT COMA ASSOCIATED WITH TYPE 1 DIABETES MELLITUS (HCC): ICD-10-CM

## 2019-01-01 DIAGNOSIS — E86.0 DEHYDRATION: ICD-10-CM

## 2019-01-01 DIAGNOSIS — N17.9 AKI (ACUTE KIDNEY INJURY) (HCC): ICD-10-CM

## 2019-01-01 DIAGNOSIS — K29.00 ACUTE GASTRITIS, PRESENCE OF BLEEDING UNSPECIFIED, UNSPECIFIED GASTRITIS TYPE: ICD-10-CM

## 2019-01-01 DIAGNOSIS — E87.6 HYPOKALEMIA: ICD-10-CM

## 2019-01-01 DIAGNOSIS — E10.11 DIABETIC KETOACIDOSIS WITH COMA ASSOCIATED WITH TYPE 1 DIABETES MELLITUS (HCC): ICD-10-CM

## 2019-01-01 DIAGNOSIS — E10.10 DKA, TYPE 1, NOT AT GOAL (HCC): ICD-10-CM

## 2019-01-01 DIAGNOSIS — R11.2 NON-INTRACTABLE VOMITING WITH NAUSEA, UNSPECIFIED VOMITING TYPE: ICD-10-CM

## 2019-01-01 DIAGNOSIS — R57.1 HYPOVOLEMIC SHOCK (HCC): ICD-10-CM

## 2019-01-01 DIAGNOSIS — E11.65 POORLY CONTROLLED DIABETES MELLITUS (HCC): ICD-10-CM

## 2019-01-01 DIAGNOSIS — R52 BODY ACHES: ICD-10-CM

## 2019-01-01 DIAGNOSIS — D72.829 LEUKOCYTOSIS, UNSPECIFIED TYPE: ICD-10-CM

## 2019-01-01 LAB
ACTION RANGE TRIGGERED IACRT: YES
ACTION RANGE TRIGGERED IACRT: YES
ALBUMIN SERPL BCP-MCNC: 2.4 G/DL (ref 3.2–4.9)
ALBUMIN SERPL BCP-MCNC: 2.9 G/DL (ref 3.2–4.9)
ALBUMIN SERPL BCP-MCNC: 3 G/DL (ref 3.2–4.9)
ALBUMIN SERPL BCP-MCNC: 3.1 G/DL (ref 3.2–4.9)
ALBUMIN SERPL BCP-MCNC: 3.4 G/DL (ref 3.2–4.9)
ALBUMIN SERPL BCP-MCNC: 3.5 G/DL (ref 3.2–4.9)
ALBUMIN SERPL BCP-MCNC: 4.3 G/DL (ref 3.2–4.9)
ALBUMIN SERPL BCP-MCNC: 4.6 G/DL (ref 3.2–4.9)
ALBUMIN/GLOB SERPL: 1.3 G/DL
ALBUMIN/GLOB SERPL: 1.4 G/DL
ALBUMIN/GLOB SERPL: 1.6 G/DL
ALP SERPL-CCNC: 103 U/L (ref 30–99)
ALP SERPL-CCNC: 108 U/L (ref 30–99)
ALP SERPL-CCNC: 118 U/L (ref 30–99)
ALP SERPL-CCNC: 121 U/L (ref 30–99)
ALP SERPL-CCNC: 143 U/L (ref 30–99)
ALP SERPL-CCNC: 149 U/L (ref 30–99)
ALP SERPL-CCNC: 184 U/L (ref 30–99)
ALP SERPL-CCNC: 83 U/L (ref 30–99)
ALT SERPL-CCNC: 10 U/L (ref 2–50)
ALT SERPL-CCNC: 18 U/L (ref 2–50)
ALT SERPL-CCNC: 19 U/L (ref 2–50)
ALT SERPL-CCNC: 22 U/L (ref 2–50)
ALT SERPL-CCNC: 24 U/L (ref 2–50)
ALT SERPL-CCNC: 25 U/L (ref 2–50)
ALT SERPL-CCNC: 26 U/L (ref 2–50)
ALT SERPL-CCNC: 29 U/L (ref 2–50)
AMPHET UR QL SCN: NEGATIVE
AMPHET UR QL SCN: NEGATIVE
ANION GAP SERPL CALC-SCNC: 10 MMOL/L (ref 0–11.9)
ANION GAP SERPL CALC-SCNC: 11 MMOL/L (ref 0–11.9)
ANION GAP SERPL CALC-SCNC: 12 MMOL/L (ref 0–11.9)
ANION GAP SERPL CALC-SCNC: 13 MMOL/L (ref 0–11.9)
ANION GAP SERPL CALC-SCNC: 14 MMOL/L (ref 0–11.9)
ANION GAP SERPL CALC-SCNC: 15 MMOL/L (ref 0–11.9)
ANION GAP SERPL CALC-SCNC: 16 MMOL/L (ref 0–11.9)
ANION GAP SERPL CALC-SCNC: 17 MMOL/L (ref 0–11.9)
ANION GAP SERPL CALC-SCNC: 19 MMOL/L (ref 0–11.9)
ANION GAP SERPL CALC-SCNC: 21 MMOL/L (ref 0–11.9)
ANION GAP SERPL CALC-SCNC: 23 MMOL/L (ref 0–11.9)
ANION GAP SERPL CALC-SCNC: 25 MMOL/L (ref 0–11.9)
ANION GAP SERPL CALC-SCNC: 26 MMOL/L (ref 0–11.9)
ANION GAP SERPL CALC-SCNC: 27 MMOL/L (ref 0–11.9)
ANION GAP SERPL CALC-SCNC: 27 MMOL/L (ref 0–11.9)
ANION GAP SERPL CALC-SCNC: 31 MMOL/L (ref 0–11.9)
ANION GAP SERPL CALC-SCNC: 31 MMOL/L (ref 0–11.9)
ANION GAP SERPL CALC-SCNC: 4 MMOL/L (ref 0–11.9)
ANION GAP SERPL CALC-SCNC: 5 MMOL/L (ref 0–11.9)
ANION GAP SERPL CALC-SCNC: 6 MMOL/L (ref 0–11.9)
ANION GAP SERPL CALC-SCNC: 7 MMOL/L (ref 0–11.9)
ANION GAP SERPL CALC-SCNC: 8 MMOL/L (ref 0–11.9)
ANION GAP SERPL CALC-SCNC: 9 MMOL/L (ref 0–11.9)
ANISOCYTOSIS BLD QL SMEAR: ABNORMAL
APPEARANCE UR: ABNORMAL
APPEARANCE UR: CLEAR
APTT PPP: 20.2 SEC (ref 24.7–36)
APTT PPP: 20.7 SEC (ref 24.7–36)
AST SERPL-CCNC: 12 U/L (ref 12–45)
AST SERPL-CCNC: 15 U/L (ref 12–45)
AST SERPL-CCNC: 17 U/L (ref 12–45)
AST SERPL-CCNC: 19 U/L (ref 12–45)
AST SERPL-CCNC: 26 U/L (ref 12–45)
AST SERPL-CCNC: 34 U/L (ref 12–45)
AST SERPL-CCNC: 35 U/L (ref 12–45)
AST SERPL-CCNC: 42 U/L (ref 12–45)
B-OH-BUTYR SERPL-MCNC: 0.21 MMOL/L (ref 0.02–0.27)
B-OH-BUTYR SERPL-MCNC: 0.37 MMOL/L (ref 0.02–0.27)
B-OH-BUTYR SERPL-MCNC: 0.44 MMOL/L (ref 0.02–0.27)
B-OH-BUTYR SERPL-MCNC: 0.59 MMOL/L (ref 0.02–0.27)
B-OH-BUTYR SERPL-MCNC: 2.07 MMOL/L (ref 0.02–0.27)
B-OH-BUTYR SERPL-MCNC: >8 MMOL/L (ref 0.02–0.27)
BACTERIA #/AREA URNS HPF: NEGATIVE /HPF
BACTERIA #/AREA URNS HPF: NEGATIVE /HPF
BACTERIA BLD CULT: NORMAL
BACTERIA UR CULT: NORMAL
BARBITURATES UR QL SCN: NEGATIVE
BARBITURATES UR QL SCN: NEGATIVE
BASE EXCESS BLDA CALC-SCNC: -28 MMOL/L (ref -4–3)
BASE EXCESS BLDA CALC-SCNC: ABNORMAL MMOL/L (ref -4–3)
BASE EXCESS BLDV CALC-SCNC: -24 MMOL/L
BASOPHILS # BLD AUTO: 0.1 % (ref 0–1.8)
BASOPHILS # BLD AUTO: 0.2 % (ref 0–1.8)
BASOPHILS # BLD AUTO: 0.2 % (ref 0–1.8)
BASOPHILS # BLD AUTO: 0.3 % (ref 0–1.8)
BASOPHILS # BLD AUTO: 0.4 % (ref 0–1.8)
BASOPHILS # BLD AUTO: 0.5 % (ref 0–1.8)
BASOPHILS # BLD AUTO: 0.6 % (ref 0–1.8)
BASOPHILS # BLD AUTO: 3.6 % (ref 0–1.8)
BASOPHILS # BLD: 0.01 K/UL (ref 0–0.12)
BASOPHILS # BLD: 0.02 K/UL (ref 0–0.12)
BASOPHILS # BLD: 0.03 K/UL (ref 0–0.12)
BASOPHILS # BLD: 0.04 K/UL (ref 0–0.12)
BASOPHILS # BLD: 0.06 K/UL (ref 0–0.12)
BASOPHILS # BLD: 0.15 K/UL (ref 0–0.12)
BASOPHILS # BLD: 0.65 K/UL (ref 0–0.12)
BENZODIAZ UR QL SCN: NEGATIVE
BENZODIAZ UR QL SCN: NEGATIVE
BILIRUB SERPL-MCNC: 0.2 MG/DL (ref 0.1–1.5)
BILIRUB SERPL-MCNC: 0.3 MG/DL (ref 0.1–1.5)
BILIRUB SERPL-MCNC: 0.3 MG/DL (ref 0.1–1.5)
BILIRUB SERPL-MCNC: 0.4 MG/DL (ref 0.1–1.5)
BILIRUB SERPL-MCNC: 0.4 MG/DL (ref 0.1–1.5)
BILIRUB SERPL-MCNC: 0.5 MG/DL (ref 0.1–1.5)
BILIRUB UR QL STRIP.AUTO: ABNORMAL
BILIRUB UR QL STRIP.AUTO: NEGATIVE
BODY FLD TYPE: ABNORMAL
BODY TEMPERATURE: ABNORMAL CENTIGRADE
BODY TEMPERATURE: ABNORMAL DEGREES
BODY TEMPERATURE: ABNORMAL DEGREES
BUN SERPL-MCNC: 10 MG/DL (ref 8–22)
BUN SERPL-MCNC: 11 MG/DL (ref 8–22)
BUN SERPL-MCNC: 11 MG/DL (ref 8–22)
BUN SERPL-MCNC: 12 MG/DL (ref 8–22)
BUN SERPL-MCNC: 13 MG/DL (ref 8–22)
BUN SERPL-MCNC: 14 MG/DL (ref 8–22)
BUN SERPL-MCNC: 16 MG/DL (ref 8–22)
BUN SERPL-MCNC: 17 MG/DL (ref 8–22)
BUN SERPL-MCNC: 17 MG/DL (ref 8–22)
BUN SERPL-MCNC: 18 MG/DL (ref 8–22)
BUN SERPL-MCNC: 20 MG/DL (ref 8–22)
BUN SERPL-MCNC: 20 MG/DL (ref 8–22)
BUN SERPL-MCNC: 22 MG/DL (ref 8–22)
BUN SERPL-MCNC: 23 MG/DL (ref 8–22)
BUN SERPL-MCNC: 23 MG/DL (ref 8–22)
BUN SERPL-MCNC: 24 MG/DL (ref 8–22)
BUN SERPL-MCNC: 29 MG/DL (ref 8–22)
BUN SERPL-MCNC: 3 MG/DL (ref 8–22)
BUN SERPL-MCNC: 4 MG/DL (ref 8–22)
BUN SERPL-MCNC: 5 MG/DL (ref 8–22)
BUN SERPL-MCNC: 6 MG/DL (ref 8–22)
BUN SERPL-MCNC: 7 MG/DL (ref 8–22)
BUN SERPL-MCNC: 8 MG/DL (ref 8–22)
BUN SERPL-MCNC: 8 MG/DL (ref 8–22)
BUN SERPL-MCNC: 9 MG/DL (ref 8–22)
BUN SERPL-MCNC: <3 MG/DL (ref 8–22)
BUN SERPL-MCNC: <3 MG/DL (ref 8–22)
BZE UR QL SCN: NEGATIVE
BZE UR QL SCN: NEGATIVE
CA-I BLD ISE-SCNC: 1.29 MMOL/L (ref 1.1–1.3)
CALCIUM SERPL-MCNC: 5.8 MG/DL (ref 8.5–10.5)
CALCIUM SERPL-MCNC: 6.9 MG/DL (ref 8.5–10.5)
CALCIUM SERPL-MCNC: 7.2 MG/DL (ref 8.5–10.5)
CALCIUM SERPL-MCNC: 7.2 MG/DL (ref 8.5–10.5)
CALCIUM SERPL-MCNC: 7.3 MG/DL (ref 8.5–10.5)
CALCIUM SERPL-MCNC: 7.3 MG/DL (ref 8.5–10.5)
CALCIUM SERPL-MCNC: 7.5 MG/DL (ref 8.5–10.5)
CALCIUM SERPL-MCNC: 7.6 MG/DL (ref 8.5–10.5)
CALCIUM SERPL-MCNC: 7.7 MG/DL (ref 8.5–10.5)
CALCIUM SERPL-MCNC: 7.8 MG/DL (ref 8.5–10.5)
CALCIUM SERPL-MCNC: 7.9 MG/DL (ref 8.5–10.5)
CALCIUM SERPL-MCNC: 8 MG/DL (ref 8.5–10.5)
CALCIUM SERPL-MCNC: 8.1 MG/DL (ref 8.5–10.5)
CALCIUM SERPL-MCNC: 8.1 MG/DL (ref 8.5–10.5)
CALCIUM SERPL-MCNC: 8.2 MG/DL (ref 8.5–10.5)
CALCIUM SERPL-MCNC: 8.2 MG/DL (ref 8.5–10.5)
CALCIUM SERPL-MCNC: 8.3 MG/DL (ref 8.5–10.5)
CALCIUM SERPL-MCNC: 8.4 MG/DL (ref 8.5–10.5)
CALCIUM SERPL-MCNC: 8.5 MG/DL (ref 8.5–10.5)
CALCIUM SERPL-MCNC: 8.7 MG/DL (ref 8.5–10.5)
CALCIUM SERPL-MCNC: 8.7 MG/DL (ref 8.5–10.5)
CALCIUM SERPL-MCNC: 8.8 MG/DL (ref 8.5–10.5)
CALCIUM SERPL-MCNC: 9 MG/DL (ref 8.5–10.5)
CALCIUM SERPL-MCNC: 9.1 MG/DL (ref 8.5–10.5)
CALCIUM SERPL-MCNC: 9.6 MG/DL (ref 8.5–10.5)
CANNABINOIDS UR QL SCN: NEGATIVE
CANNABINOIDS UR QL SCN: NEGATIVE
CHLORIDE SERPL-SCNC: 104 MMOL/L (ref 96–112)
CHLORIDE SERPL-SCNC: 105 MMOL/L (ref 96–112)
CHLORIDE SERPL-SCNC: 106 MMOL/L (ref 96–112)
CHLORIDE SERPL-SCNC: 107 MMOL/L (ref 96–112)
CHLORIDE SERPL-SCNC: 108 MMOL/L (ref 96–112)
CHLORIDE SERPL-SCNC: 109 MMOL/L (ref 96–112)
CHLORIDE SERPL-SCNC: 110 MMOL/L (ref 96–112)
CHLORIDE SERPL-SCNC: 110 MMOL/L (ref 96–112)
CHLORIDE SERPL-SCNC: 111 MMOL/L (ref 96–112)
CHLORIDE SERPL-SCNC: 112 MMOL/L (ref 96–112)
CHLORIDE SERPL-SCNC: 113 MMOL/L (ref 96–112)
CHLORIDE SERPL-SCNC: 114 MMOL/L (ref 96–112)
CHLORIDE SERPL-SCNC: 115 MMOL/L (ref 96–112)
CHLORIDE SERPL-SCNC: 116 MMOL/L (ref 96–112)
CHLORIDE SERPL-SCNC: 116 MMOL/L (ref 96–112)
CHLORIDE SERPL-SCNC: 117 MMOL/L (ref 96–112)
CHLORIDE SERPL-SCNC: 118 MMOL/L (ref 96–112)
CHLORIDE SERPL-SCNC: 119 MMOL/L (ref 96–112)
CHLORIDE SERPL-SCNC: 120 MMOL/L (ref 96–112)
CHLORIDE SERPL-SCNC: 121 MMOL/L (ref 96–112)
CHLORIDE SERPL-SCNC: 121 MMOL/L (ref 96–112)
CHLORIDE SERPL-SCNC: 122 MMOL/L (ref 96–112)
CHLORIDE SERPL-SCNC: 124 MMOL/L (ref 96–112)
CHLORIDE SERPL-SCNC: 124 MMOL/L (ref 96–112)
CHLORIDE SERPL-SCNC: 125 MMOL/L (ref 96–112)
CHLORIDE SERPL-SCNC: 126 MMOL/L (ref 96–112)
CHLORIDE SERPL-SCNC: 92 MMOL/L (ref 96–112)
CO2 BLDA-SCNC: <5 MMOL/L (ref 20–33)
CO2 BLDA-SCNC: ABNORMAL MMOL/L (ref 20–33)
CO2 SERPL-SCNC: 11 MMOL/L (ref 20–33)
CO2 SERPL-SCNC: 11 MMOL/L (ref 20–33)
CO2 SERPL-SCNC: 13 MMOL/L (ref 20–33)
CO2 SERPL-SCNC: 14 MMOL/L (ref 20–33)
CO2 SERPL-SCNC: 15 MMOL/L (ref 20–33)
CO2 SERPL-SCNC: 16 MMOL/L (ref 20–33)
CO2 SERPL-SCNC: 17 MMOL/L (ref 20–33)
CO2 SERPL-SCNC: 18 MMOL/L (ref 20–33)
CO2 SERPL-SCNC: 19 MMOL/L (ref 20–33)
CO2 SERPL-SCNC: 20 MMOL/L (ref 20–33)
CO2 SERPL-SCNC: 21 MMOL/L (ref 20–33)
CO2 SERPL-SCNC: 22 MMOL/L (ref 20–33)
CO2 SERPL-SCNC: 23 MMOL/L (ref 20–33)
CO2 SERPL-SCNC: 24 MMOL/L (ref 20–33)
CO2 SERPL-SCNC: 28 MMOL/L (ref 20–33)
CO2 SERPL-SCNC: 5 MMOL/L (ref 20–33)
CO2 SERPL-SCNC: 5 MMOL/L (ref 20–33)
CO2 SERPL-SCNC: 6 MMOL/L (ref 20–33)
CO2 SERPL-SCNC: 8 MMOL/L (ref 20–33)
CO2 SERPL-SCNC: 8 MMOL/L (ref 20–33)
CO2 SERPL-SCNC: 9 MMOL/L (ref 20–33)
CO2 SERPL-SCNC: <5 MMOL/L (ref 20–33)
COLOR UR: YELLOW
COMMENT 1642: NORMAL
COMMENT 1642: NORMAL
CORTIS SERPL-MCNC: 12.6 UG/DL (ref 0–23)
CORTIS SERPL-MCNC: 3.3 UG/DL (ref 0–23)
CORTIS SERPL-MCNC: 31.6 UG/DL (ref 0–23)
CREAT SERPL-MCNC: 0.36 MG/DL (ref 0.5–1.4)
CREAT SERPL-MCNC: 0.36 MG/DL (ref 0.5–1.4)
CREAT SERPL-MCNC: 0.37 MG/DL (ref 0.5–1.4)
CREAT SERPL-MCNC: 0.39 MG/DL (ref 0.5–1.4)
CREAT SERPL-MCNC: 0.4 MG/DL (ref 0.5–1.4)
CREAT SERPL-MCNC: 0.41 MG/DL (ref 0.5–1.4)
CREAT SERPL-MCNC: 0.42 MG/DL (ref 0.5–1.4)
CREAT SERPL-MCNC: 0.42 MG/DL (ref 0.5–1.4)
CREAT SERPL-MCNC: 0.43 MG/DL (ref 0.5–1.4)
CREAT SERPL-MCNC: 0.44 MG/DL (ref 0.5–1.4)
CREAT SERPL-MCNC: 0.44 MG/DL (ref 0.5–1.4)
CREAT SERPL-MCNC: 0.45 MG/DL (ref 0.5–1.4)
CREAT SERPL-MCNC: 0.46 MG/DL (ref 0.5–1.4)
CREAT SERPL-MCNC: 0.46 MG/DL (ref 0.5–1.4)
CREAT SERPL-MCNC: 0.47 MG/DL (ref 0.5–1.4)
CREAT SERPL-MCNC: 0.48 MG/DL (ref 0.5–1.4)
CREAT SERPL-MCNC: 0.48 MG/DL (ref 0.5–1.4)
CREAT SERPL-MCNC: 0.49 MG/DL (ref 0.5–1.4)
CREAT SERPL-MCNC: 0.49 MG/DL (ref 0.5–1.4)
CREAT SERPL-MCNC: 0.5 MG/DL (ref 0.5–1.4)
CREAT SERPL-MCNC: 0.5 MG/DL (ref 0.5–1.4)
CREAT SERPL-MCNC: 0.52 MG/DL (ref 0.5–1.4)
CREAT SERPL-MCNC: 0.52 MG/DL (ref 0.5–1.4)
CREAT SERPL-MCNC: 0.53 MG/DL (ref 0.5–1.4)
CREAT SERPL-MCNC: 0.54 MG/DL (ref 0.5–1.4)
CREAT SERPL-MCNC: 0.55 MG/DL (ref 0.5–1.4)
CREAT SERPL-MCNC: 0.55 MG/DL (ref 0.5–1.4)
CREAT SERPL-MCNC: 0.56 MG/DL (ref 0.5–1.4)
CREAT SERPL-MCNC: 0.57 MG/DL (ref 0.5–1.4)
CREAT SERPL-MCNC: 0.59 MG/DL (ref 0.5–1.4)
CREAT SERPL-MCNC: 0.59 MG/DL (ref 0.5–1.4)
CREAT SERPL-MCNC: 0.6 MG/DL (ref 0.5–1.4)
CREAT SERPL-MCNC: 0.61 MG/DL (ref 0.5–1.4)
CREAT SERPL-MCNC: 0.62 MG/DL (ref 0.5–1.4)
CREAT SERPL-MCNC: 0.63 MG/DL (ref 0.5–1.4)
CREAT SERPL-MCNC: 0.64 MG/DL (ref 0.5–1.4)
CREAT SERPL-MCNC: 0.68 MG/DL (ref 0.5–1.4)
CREAT SERPL-MCNC: 0.71 MG/DL (ref 0.5–1.4)
CREAT SERPL-MCNC: 0.72 MG/DL (ref 0.5–1.4)
CREAT SERPL-MCNC: 0.73 MG/DL (ref 0.5–1.4)
CREAT SERPL-MCNC: 0.74 MG/DL (ref 0.5–1.4)
CREAT SERPL-MCNC: 0.77 MG/DL (ref 0.5–1.4)
CREAT SERPL-MCNC: 0.81 MG/DL (ref 0.5–1.4)
CREAT SERPL-MCNC: 0.82 MG/DL (ref 0.5–1.4)
CREAT SERPL-MCNC: 0.82 MG/DL (ref 0.5–1.4)
CREAT SERPL-MCNC: 0.84 MG/DL (ref 0.5–1.4)
CREAT SERPL-MCNC: 0.87 MG/DL (ref 0.5–1.4)
CREAT SERPL-MCNC: 0.89 MG/DL (ref 0.5–1.4)
CREAT SERPL-MCNC: 0.92 MG/DL (ref 0.5–1.4)
CREAT SERPL-MCNC: 1.01 MG/DL (ref 0.5–1.4)
CREAT SERPL-MCNC: 1.09 MG/DL (ref 0.5–1.4)
CREAT SERPL-MCNC: 1.23 MG/DL (ref 0.5–1.4)
CREAT SERPL-MCNC: 1.3 MG/DL (ref 0.5–1.4)
D DIMER PPP IA.FEU-MCNC: 3.67 UG/ML (FEU) (ref 0–0.5)
EKG IMPRESSION: NORMAL
EOSINOPHIL # BLD AUTO: 0.01 K/UL (ref 0–0.51)
EOSINOPHIL # BLD AUTO: 0.02 K/UL (ref 0–0.51)
EOSINOPHIL # BLD AUTO: 0.02 K/UL (ref 0–0.51)
EOSINOPHIL # BLD AUTO: 0.03 K/UL (ref 0–0.51)
EOSINOPHIL # BLD AUTO: 0.05 K/UL (ref 0–0.51)
EOSINOPHIL # BLD AUTO: 0.05 K/UL (ref 0–0.51)
EOSINOPHIL # BLD AUTO: 0.06 K/UL (ref 0–0.51)
EOSINOPHIL # BLD AUTO: 0.08 K/UL (ref 0–0.51)
EOSINOPHIL # BLD AUTO: 0.11 K/UL (ref 0–0.51)
EOSINOPHIL # BLD AUTO: 0.11 K/UL (ref 0–0.51)
EOSINOPHIL # BLD AUTO: 0.13 K/UL (ref 0–0.51)
EOSINOPHIL # BLD AUTO: 0.15 K/UL (ref 0–0.51)
EOSINOPHIL # BLD AUTO: 0.16 K/UL (ref 0–0.51)
EOSINOPHIL # BLD AUTO: 0.18 K/UL (ref 0–0.51)
EOSINOPHIL # BLD AUTO: 0.18 K/UL (ref 0–0.51)
EOSINOPHIL # BLD AUTO: 0.19 K/UL (ref 0–0.51)
EOSINOPHIL # BLD AUTO: 0.2 K/UL (ref 0–0.51)
EOSINOPHIL # BLD AUTO: 0.27 K/UL (ref 0–0.51)
EOSINOPHIL NFR BLD: 0.1 % (ref 0–6.9)
EOSINOPHIL NFR BLD: 0.1 % (ref 0–6.9)
EOSINOPHIL NFR BLD: 0.2 % (ref 0–6.9)
EOSINOPHIL NFR BLD: 0.2 % (ref 0–6.9)
EOSINOPHIL NFR BLD: 0.4 % (ref 0–6.9)
EOSINOPHIL NFR BLD: 0.7 % (ref 0–6.9)
EOSINOPHIL NFR BLD: 0.8 % (ref 0–6.9)
EOSINOPHIL NFR BLD: 0.9 % (ref 0–6.9)
EOSINOPHIL NFR BLD: 1 % (ref 0–6.9)
EOSINOPHIL NFR BLD: 1.8 % (ref 0–6.9)
EOSINOPHIL NFR BLD: 2.4 % (ref 0–6.9)
EOSINOPHIL NFR BLD: 2.7 % (ref 0–6.9)
EOSINOPHIL NFR BLD: 3 % (ref 0–6.9)
EOSINOPHIL NFR BLD: 3 % (ref 0–6.9)
EOSINOPHIL NFR BLD: 3.3 % (ref 0–6.9)
EOSINOPHIL NFR BLD: 3.4 % (ref 0–6.9)
EOSINOPHIL NFR BLD: 4.4 % (ref 0–6.9)
EOSINOPHIL NFR BLD: 6.3 % (ref 0–6.9)
EPI CELLS #/AREA URNS HPF: ABNORMAL /HPF
EPI CELLS #/AREA URNS HPF: NEGATIVE /HPF
ERYTHROCYTE [DISTWIDTH] IN BLOOD BY AUTOMATED COUNT: 43.6 FL (ref 35.9–50)
ERYTHROCYTE [DISTWIDTH] IN BLOOD BY AUTOMATED COUNT: 43.8 FL (ref 35.9–50)
ERYTHROCYTE [DISTWIDTH] IN BLOOD BY AUTOMATED COUNT: 44.5 FL (ref 35.9–50)
ERYTHROCYTE [DISTWIDTH] IN BLOOD BY AUTOMATED COUNT: 46.1 FL (ref 35.9–50)
ERYTHROCYTE [DISTWIDTH] IN BLOOD BY AUTOMATED COUNT: 46.6 FL (ref 35.9–50)
ERYTHROCYTE [DISTWIDTH] IN BLOOD BY AUTOMATED COUNT: 46.6 FL (ref 35.9–50)
ERYTHROCYTE [DISTWIDTH] IN BLOOD BY AUTOMATED COUNT: 46.7 FL (ref 35.9–50)
ERYTHROCYTE [DISTWIDTH] IN BLOOD BY AUTOMATED COUNT: 47.1 FL (ref 35.9–50)
ERYTHROCYTE [DISTWIDTH] IN BLOOD BY AUTOMATED COUNT: 47.2 FL (ref 35.9–50)
ERYTHROCYTE [DISTWIDTH] IN BLOOD BY AUTOMATED COUNT: 48.6 FL (ref 35.9–50)
ERYTHROCYTE [DISTWIDTH] IN BLOOD BY AUTOMATED COUNT: 48.9 FL (ref 35.9–50)
ERYTHROCYTE [DISTWIDTH] IN BLOOD BY AUTOMATED COUNT: 49 FL (ref 35.9–50)
ERYTHROCYTE [DISTWIDTH] IN BLOOD BY AUTOMATED COUNT: 49.3 FL (ref 35.9–50)
ERYTHROCYTE [DISTWIDTH] IN BLOOD BY AUTOMATED COUNT: 50.2 FL (ref 35.9–50)
ERYTHROCYTE [DISTWIDTH] IN BLOOD BY AUTOMATED COUNT: 51.3 FL (ref 35.9–50)
ERYTHROCYTE [DISTWIDTH] IN BLOOD BY AUTOMATED COUNT: 51.5 FL (ref 35.9–50)
ERYTHROCYTE [DISTWIDTH] IN BLOOD BY AUTOMATED COUNT: 51.9 FL (ref 35.9–50)
ERYTHROCYTE [DISTWIDTH] IN BLOOD BY AUTOMATED COUNT: 52.2 FL (ref 35.9–50)
ERYTHROCYTE [DISTWIDTH] IN BLOOD BY AUTOMATED COUNT: 53.8 FL (ref 35.9–50)
ERYTHROCYTE [DISTWIDTH] IN BLOOD BY AUTOMATED COUNT: 54.6 FL (ref 35.9–50)
EST. AVERAGE GLUCOSE BLD GHB EST-MCNC: 355 MG/DL
FLUAV RNA SPEC QL NAA+PROBE: NEGATIVE
FLUBV RNA SPEC QL NAA+PROBE: NEGATIVE
GLOBULIN SER CALC-MCNC: 1.8 G/DL (ref 1.9–3.5)
GLOBULIN SER CALC-MCNC: 2 G/DL (ref 1.9–3.5)
GLOBULIN SER CALC-MCNC: 2.2 G/DL (ref 1.9–3.5)
GLOBULIN SER CALC-MCNC: 2.3 G/DL (ref 1.9–3.5)
GLOBULIN SER CALC-MCNC: 2.5 G/DL (ref 1.9–3.5)
GLOBULIN SER CALC-MCNC: 2.6 G/DL (ref 1.9–3.5)
GLOBULIN SER CALC-MCNC: 3 G/DL (ref 1.9–3.5)
GLOBULIN SER CALC-MCNC: 3.4 G/DL (ref 1.9–3.5)
GLUCOSE BLD-MCNC: 100 MG/DL (ref 65–99)
GLUCOSE BLD-MCNC: 101 MG/DL (ref 65–99)
GLUCOSE BLD-MCNC: 102 MG/DL (ref 65–99)
GLUCOSE BLD-MCNC: 102 MG/DL (ref 65–99)
GLUCOSE BLD-MCNC: 103 MG/DL (ref 65–99)
GLUCOSE BLD-MCNC: 104 MG/DL (ref 65–99)
GLUCOSE BLD-MCNC: 105 MG/DL (ref 65–99)
GLUCOSE BLD-MCNC: 105 MG/DL (ref 65–99)
GLUCOSE BLD-MCNC: 106 MG/DL (ref 65–99)
GLUCOSE BLD-MCNC: 106 MG/DL (ref 65–99)
GLUCOSE BLD-MCNC: 107 MG/DL (ref 65–99)
GLUCOSE BLD-MCNC: 109 MG/DL (ref 65–99)
GLUCOSE BLD-MCNC: 110 MG/DL (ref 65–99)
GLUCOSE BLD-MCNC: 111 MG/DL (ref 65–99)
GLUCOSE BLD-MCNC: 112 MG/DL (ref 65–99)
GLUCOSE BLD-MCNC: 112 MG/DL (ref 65–99)
GLUCOSE BLD-MCNC: 113 MG/DL (ref 65–99)
GLUCOSE BLD-MCNC: 114 MG/DL (ref 65–99)
GLUCOSE BLD-MCNC: 114 MG/DL (ref 65–99)
GLUCOSE BLD-MCNC: 115 MG/DL (ref 65–99)
GLUCOSE BLD-MCNC: 115 MG/DL (ref 65–99)
GLUCOSE BLD-MCNC: 116 MG/DL (ref 65–99)
GLUCOSE BLD-MCNC: 117 MG/DL (ref 65–99)
GLUCOSE BLD-MCNC: 118 MG/DL (ref 65–99)
GLUCOSE BLD-MCNC: 119 MG/DL (ref 65–99)
GLUCOSE BLD-MCNC: 121 MG/DL (ref 65–99)
GLUCOSE BLD-MCNC: 122 MG/DL (ref 65–99)
GLUCOSE BLD-MCNC: 122 MG/DL (ref 65–99)
GLUCOSE BLD-MCNC: 123 MG/DL (ref 65–99)
GLUCOSE BLD-MCNC: 124 MG/DL (ref 65–99)
GLUCOSE BLD-MCNC: 125 MG/DL (ref 65–99)
GLUCOSE BLD-MCNC: 126 MG/DL (ref 65–99)
GLUCOSE BLD-MCNC: 127 MG/DL (ref 65–99)
GLUCOSE BLD-MCNC: 128 MG/DL (ref 65–99)
GLUCOSE BLD-MCNC: 128 MG/DL (ref 65–99)
GLUCOSE BLD-MCNC: 129 MG/DL (ref 65–99)
GLUCOSE BLD-MCNC: 130 MG/DL (ref 65–99)
GLUCOSE BLD-MCNC: 133 MG/DL (ref 65–99)
GLUCOSE BLD-MCNC: 134 MG/DL (ref 65–99)
GLUCOSE BLD-MCNC: 135 MG/DL (ref 65–99)
GLUCOSE BLD-MCNC: 135 MG/DL (ref 65–99)
GLUCOSE BLD-MCNC: 136 MG/DL (ref 65–99)
GLUCOSE BLD-MCNC: 137 MG/DL (ref 65–99)
GLUCOSE BLD-MCNC: 138 MG/DL (ref 65–99)
GLUCOSE BLD-MCNC: 138 MG/DL (ref 65–99)
GLUCOSE BLD-MCNC: 139 MG/DL (ref 65–99)
GLUCOSE BLD-MCNC: 139 MG/DL (ref 65–99)
GLUCOSE BLD-MCNC: 141 MG/DL (ref 65–99)
GLUCOSE BLD-MCNC: 142 MG/DL (ref 65–99)
GLUCOSE BLD-MCNC: 142 MG/DL (ref 65–99)
GLUCOSE BLD-MCNC: 143 MG/DL (ref 65–99)
GLUCOSE BLD-MCNC: 143 MG/DL (ref 65–99)
GLUCOSE BLD-MCNC: 144 MG/DL (ref 65–99)
GLUCOSE BLD-MCNC: 145 MG/DL (ref 65–99)
GLUCOSE BLD-MCNC: 146 MG/DL (ref 65–99)
GLUCOSE BLD-MCNC: 146 MG/DL (ref 65–99)
GLUCOSE BLD-MCNC: 147 MG/DL (ref 65–99)
GLUCOSE BLD-MCNC: 147 MG/DL (ref 65–99)
GLUCOSE BLD-MCNC: 148 MG/DL (ref 65–99)
GLUCOSE BLD-MCNC: 148 MG/DL (ref 65–99)
GLUCOSE BLD-MCNC: 149 MG/DL (ref 65–99)
GLUCOSE BLD-MCNC: 149 MG/DL (ref 65–99)
GLUCOSE BLD-MCNC: 150 MG/DL (ref 65–99)
GLUCOSE BLD-MCNC: 151 MG/DL (ref 65–99)
GLUCOSE BLD-MCNC: 153 MG/DL (ref 65–99)
GLUCOSE BLD-MCNC: 154 MG/DL (ref 65–99)
GLUCOSE BLD-MCNC: 155 MG/DL (ref 65–99)
GLUCOSE BLD-MCNC: 156 MG/DL (ref 65–99)
GLUCOSE BLD-MCNC: 157 MG/DL (ref 65–99)
GLUCOSE BLD-MCNC: 157 MG/DL (ref 65–99)
GLUCOSE BLD-MCNC: 158 MG/DL (ref 65–99)
GLUCOSE BLD-MCNC: 158 MG/DL (ref 65–99)
GLUCOSE BLD-MCNC: 159 MG/DL (ref 65–99)
GLUCOSE BLD-MCNC: 159 MG/DL (ref 65–99)
GLUCOSE BLD-MCNC: 160 MG/DL (ref 65–99)
GLUCOSE BLD-MCNC: 161 MG/DL (ref 65–99)
GLUCOSE BLD-MCNC: 161 MG/DL (ref 65–99)
GLUCOSE BLD-MCNC: 162 MG/DL (ref 65–99)
GLUCOSE BLD-MCNC: 163 MG/DL (ref 65–99)
GLUCOSE BLD-MCNC: 164 MG/DL (ref 65–99)
GLUCOSE BLD-MCNC: 165 MG/DL (ref 65–99)
GLUCOSE BLD-MCNC: 166 MG/DL (ref 65–99)
GLUCOSE BLD-MCNC: 166 MG/DL (ref 65–99)
GLUCOSE BLD-MCNC: 167 MG/DL (ref 65–99)
GLUCOSE BLD-MCNC: 169 MG/DL (ref 65–99)
GLUCOSE BLD-MCNC: 170 MG/DL (ref 65–99)
GLUCOSE BLD-MCNC: 171 MG/DL (ref 65–99)
GLUCOSE BLD-MCNC: 173 MG/DL (ref 65–99)
GLUCOSE BLD-MCNC: 174 MG/DL (ref 65–99)
GLUCOSE BLD-MCNC: 175 MG/DL (ref 65–99)
GLUCOSE BLD-MCNC: 176 MG/DL (ref 65–99)
GLUCOSE BLD-MCNC: 177 MG/DL (ref 65–99)
GLUCOSE BLD-MCNC: 179 MG/DL (ref 65–99)
GLUCOSE BLD-MCNC: 179 MG/DL (ref 65–99)
GLUCOSE BLD-MCNC: 181 MG/DL (ref 65–99)
GLUCOSE BLD-MCNC: 182 MG/DL (ref 65–99)
GLUCOSE BLD-MCNC: 183 MG/DL (ref 65–99)
GLUCOSE BLD-MCNC: 183 MG/DL (ref 65–99)
GLUCOSE BLD-MCNC: 188 MG/DL (ref 65–99)
GLUCOSE BLD-MCNC: 190 MG/DL (ref 65–99)
GLUCOSE BLD-MCNC: 193 MG/DL (ref 65–99)
GLUCOSE BLD-MCNC: 194 MG/DL (ref 65–99)
GLUCOSE BLD-MCNC: 195 MG/DL (ref 65–99)
GLUCOSE BLD-MCNC: 196 MG/DL (ref 65–99)
GLUCOSE BLD-MCNC: 196 MG/DL (ref 65–99)
GLUCOSE BLD-MCNC: 197 MG/DL (ref 65–99)
GLUCOSE BLD-MCNC: 200 MG/DL (ref 65–99)
GLUCOSE BLD-MCNC: 201 MG/DL (ref 65–99)
GLUCOSE BLD-MCNC: 201 MG/DL (ref 65–99)
GLUCOSE BLD-MCNC: 204 MG/DL (ref 65–99)
GLUCOSE BLD-MCNC: 205 MG/DL (ref 65–99)
GLUCOSE BLD-MCNC: 206 MG/DL (ref 65–99)
GLUCOSE BLD-MCNC: 208 MG/DL (ref 65–99)
GLUCOSE BLD-MCNC: 212 MG/DL (ref 65–99)
GLUCOSE BLD-MCNC: 213 MG/DL (ref 65–99)
GLUCOSE BLD-MCNC: 216 MG/DL (ref 65–99)
GLUCOSE BLD-MCNC: 218 MG/DL (ref 65–99)
GLUCOSE BLD-MCNC: 218 MG/DL (ref 65–99)
GLUCOSE BLD-MCNC: 219 MG/DL (ref 65–99)
GLUCOSE BLD-MCNC: 219 MG/DL (ref 65–99)
GLUCOSE BLD-MCNC: 220 MG/DL (ref 65–99)
GLUCOSE BLD-MCNC: 221 MG/DL (ref 65–99)
GLUCOSE BLD-MCNC: 223 MG/DL (ref 65–99)
GLUCOSE BLD-MCNC: 224 MG/DL (ref 65–99)
GLUCOSE BLD-MCNC: 229 MG/DL (ref 65–99)
GLUCOSE BLD-MCNC: 230 MG/DL (ref 65–99)
GLUCOSE BLD-MCNC: 230 MG/DL (ref 65–99)
GLUCOSE BLD-MCNC: 231 MG/DL (ref 65–99)
GLUCOSE BLD-MCNC: 232 MG/DL (ref 65–99)
GLUCOSE BLD-MCNC: 233 MG/DL (ref 65–99)
GLUCOSE BLD-MCNC: 233 MG/DL (ref 65–99)
GLUCOSE BLD-MCNC: 234 MG/DL (ref 65–99)
GLUCOSE BLD-MCNC: 235 MG/DL (ref 65–99)
GLUCOSE BLD-MCNC: 242 MG/DL (ref 65–99)
GLUCOSE BLD-MCNC: 246 MG/DL (ref 65–99)
GLUCOSE BLD-MCNC: 250 MG/DL (ref 65–99)
GLUCOSE BLD-MCNC: 250 MG/DL (ref 65–99)
GLUCOSE BLD-MCNC: 254 MG/DL (ref 65–99)
GLUCOSE BLD-MCNC: 258 MG/DL (ref 65–99)
GLUCOSE BLD-MCNC: 258 MG/DL (ref 65–99)
GLUCOSE BLD-MCNC: 262 MG/DL (ref 65–99)
GLUCOSE BLD-MCNC: 268 MG/DL (ref 65–99)
GLUCOSE BLD-MCNC: 275 MG/DL (ref 65–99)
GLUCOSE BLD-MCNC: 276 MG/DL (ref 65–99)
GLUCOSE BLD-MCNC: 276 MG/DL (ref 65–99)
GLUCOSE BLD-MCNC: 284 MG/DL (ref 65–99)
GLUCOSE BLD-MCNC: 288 MG/DL (ref 65–99)
GLUCOSE BLD-MCNC: 289 MG/DL (ref 65–99)
GLUCOSE BLD-MCNC: 291 MG/DL (ref 65–99)
GLUCOSE BLD-MCNC: 292 MG/DL (ref 65–99)
GLUCOSE BLD-MCNC: 304 MG/DL (ref 65–99)
GLUCOSE BLD-MCNC: 306 MG/DL (ref 65–99)
GLUCOSE BLD-MCNC: 310 MG/DL (ref 65–99)
GLUCOSE BLD-MCNC: 312 MG/DL (ref 65–99)
GLUCOSE BLD-MCNC: 312 MG/DL (ref 65–99)
GLUCOSE BLD-MCNC: 313 MG/DL (ref 65–99)
GLUCOSE BLD-MCNC: 314 MG/DL (ref 65–99)
GLUCOSE BLD-MCNC: 315 MG/DL (ref 65–99)
GLUCOSE BLD-MCNC: 315 MG/DL (ref 65–99)
GLUCOSE BLD-MCNC: 320 MG/DL (ref 65–99)
GLUCOSE BLD-MCNC: 322 MG/DL (ref 65–99)
GLUCOSE BLD-MCNC: 329 MG/DL (ref 65–99)
GLUCOSE BLD-MCNC: 334 MG/DL (ref 65–99)
GLUCOSE BLD-MCNC: 337 MG/DL (ref 65–99)
GLUCOSE BLD-MCNC: 348 MG/DL (ref 65–99)
GLUCOSE BLD-MCNC: 348 MG/DL (ref 65–99)
GLUCOSE BLD-MCNC: 350 MG/DL (ref 65–99)
GLUCOSE BLD-MCNC: 355 MG/DL (ref 65–99)
GLUCOSE BLD-MCNC: 363 MG/DL (ref 65–99)
GLUCOSE BLD-MCNC: 364 MG/DL (ref 65–99)
GLUCOSE BLD-MCNC: 377 MG/DL (ref 65–99)
GLUCOSE BLD-MCNC: 379 MG/DL (ref 65–99)
GLUCOSE BLD-MCNC: 380 MG/DL (ref 65–99)
GLUCOSE BLD-MCNC: 384 MG/DL (ref 65–99)
GLUCOSE BLD-MCNC: 389 MG/DL (ref 65–99)
GLUCOSE BLD-MCNC: 392 MG/DL (ref 65–99)
GLUCOSE BLD-MCNC: 400 MG/DL (ref 65–99)
GLUCOSE BLD-MCNC: 405 MG/DL (ref 65–99)
GLUCOSE BLD-MCNC: 419 MG/DL (ref 65–99)
GLUCOSE BLD-MCNC: 422 MG/DL (ref 65–99)
GLUCOSE BLD-MCNC: 436 MG/DL (ref 65–99)
GLUCOSE BLD-MCNC: 440 MG/DL (ref 65–99)
GLUCOSE BLD-MCNC: 445 MG/DL (ref 65–99)
GLUCOSE BLD-MCNC: 457 MG/DL (ref 65–99)
GLUCOSE BLD-MCNC: 468 MG/DL (ref 65–99)
GLUCOSE BLD-MCNC: 493 MG/DL (ref 65–99)
GLUCOSE BLD-MCNC: 518 MG/DL (ref 65–99)
GLUCOSE BLD-MCNC: 527 MG/DL (ref 65–99)
GLUCOSE BLD-MCNC: 533 MG/DL (ref 65–99)
GLUCOSE BLD-MCNC: 534 MG/DL (ref 65–99)
GLUCOSE BLD-MCNC: 56 MG/DL (ref 65–99)
GLUCOSE BLD-MCNC: 59 MG/DL (ref 65–99)
GLUCOSE BLD-MCNC: 64 MG/DL (ref 65–99)
GLUCOSE BLD-MCNC: 79 MG/DL (ref 65–99)
GLUCOSE BLD-MCNC: 80 MG/DL (ref 65–99)
GLUCOSE BLD-MCNC: 86 MG/DL (ref 65–99)
GLUCOSE BLD-MCNC: 87 MG/DL (ref 65–99)
GLUCOSE BLD-MCNC: 88 MG/DL (ref 65–99)
GLUCOSE BLD-MCNC: 89 MG/DL (ref 65–99)
GLUCOSE BLD-MCNC: 89 MG/DL (ref 65–99)
GLUCOSE BLD-MCNC: 90 MG/DL (ref 65–99)
GLUCOSE BLD-MCNC: 91 MG/DL (ref 65–99)
GLUCOSE BLD-MCNC: 92 MG/DL (ref 65–99)
GLUCOSE BLD-MCNC: 93 MG/DL (ref 65–99)
GLUCOSE BLD-MCNC: 94 MG/DL (ref 65–99)
GLUCOSE BLD-MCNC: 95 MG/DL (ref 65–99)
GLUCOSE BLD-MCNC: 97 MG/DL (ref 65–99)
GLUCOSE BLD-MCNC: 97 MG/DL (ref 65–99)
GLUCOSE BLD-MCNC: 98 MG/DL (ref 65–99)
GLUCOSE BLD-MCNC: 98 MG/DL (ref 65–99)
GLUCOSE BLD-MCNC: 99 MG/DL (ref 65–99)
GLUCOSE BLD-MCNC: 99 MG/DL (ref 65–99)
GLUCOSE BLD-MCNC: >600 MG/DL (ref 65–99)
GLUCOSE SERPL-MCNC: 102 MG/DL (ref 65–99)
GLUCOSE SERPL-MCNC: 102 MG/DL (ref 65–99)
GLUCOSE SERPL-MCNC: 105 MG/DL (ref 65–99)
GLUCOSE SERPL-MCNC: 108 MG/DL (ref 65–99)
GLUCOSE SERPL-MCNC: 1136 MG/DL (ref 65–99)
GLUCOSE SERPL-MCNC: 116 MG/DL (ref 65–99)
GLUCOSE SERPL-MCNC: 123 MG/DL (ref 65–99)
GLUCOSE SERPL-MCNC: 125 MG/DL (ref 65–99)
GLUCOSE SERPL-MCNC: 126 MG/DL (ref 65–99)
GLUCOSE SERPL-MCNC: 126 MG/DL (ref 65–99)
GLUCOSE SERPL-MCNC: 128 MG/DL (ref 65–99)
GLUCOSE SERPL-MCNC: 128 MG/DL (ref 65–99)
GLUCOSE SERPL-MCNC: 133 MG/DL (ref 65–99)
GLUCOSE SERPL-MCNC: 134 MG/DL (ref 65–99)
GLUCOSE SERPL-MCNC: 135 MG/DL (ref 65–99)
GLUCOSE SERPL-MCNC: 136 MG/DL (ref 65–99)
GLUCOSE SERPL-MCNC: 140 MG/DL (ref 65–99)
GLUCOSE SERPL-MCNC: 143 MG/DL (ref 65–99)
GLUCOSE SERPL-MCNC: 145 MG/DL (ref 65–99)
GLUCOSE SERPL-MCNC: 146 MG/DL (ref 65–99)
GLUCOSE SERPL-MCNC: 147 MG/DL (ref 65–99)
GLUCOSE SERPL-MCNC: 148 MG/DL (ref 65–99)
GLUCOSE SERPL-MCNC: 150 MG/DL (ref 65–99)
GLUCOSE SERPL-MCNC: 159 MG/DL (ref 65–99)
GLUCOSE SERPL-MCNC: 159 MG/DL (ref 65–99)
GLUCOSE SERPL-MCNC: 160 MG/DL (ref 65–99)
GLUCOSE SERPL-MCNC: 164 MG/DL (ref 65–99)
GLUCOSE SERPL-MCNC: 164 MG/DL (ref 65–99)
GLUCOSE SERPL-MCNC: 165 MG/DL (ref 65–99)
GLUCOSE SERPL-MCNC: 167 MG/DL (ref 65–99)
GLUCOSE SERPL-MCNC: 170 MG/DL (ref 65–99)
GLUCOSE SERPL-MCNC: 171 MG/DL (ref 65–99)
GLUCOSE SERPL-MCNC: 176 MG/DL (ref 65–99)
GLUCOSE SERPL-MCNC: 176 MG/DL (ref 65–99)
GLUCOSE SERPL-MCNC: 179 MG/DL (ref 65–99)
GLUCOSE SERPL-MCNC: 181 MG/DL (ref 65–99)
GLUCOSE SERPL-MCNC: 186 MG/DL (ref 65–99)
GLUCOSE SERPL-MCNC: 187 MG/DL (ref 65–99)
GLUCOSE SERPL-MCNC: 189 MG/DL (ref 65–99)
GLUCOSE SERPL-MCNC: 192 MG/DL (ref 65–99)
GLUCOSE SERPL-MCNC: 196 MG/DL (ref 65–99)
GLUCOSE SERPL-MCNC: 196 MG/DL (ref 65–99)
GLUCOSE SERPL-MCNC: 200 MG/DL (ref 65–99)
GLUCOSE SERPL-MCNC: 203 MG/DL (ref 65–99)
GLUCOSE SERPL-MCNC: 205 MG/DL (ref 65–99)
GLUCOSE SERPL-MCNC: 226 MG/DL (ref 65–99)
GLUCOSE SERPL-MCNC: 245 MG/DL (ref 65–99)
GLUCOSE SERPL-MCNC: 255 MG/DL (ref 65–99)
GLUCOSE SERPL-MCNC: 265 MG/DL (ref 65–99)
GLUCOSE SERPL-MCNC: 267 MG/DL (ref 65–99)
GLUCOSE SERPL-MCNC: 280 MG/DL (ref 65–99)
GLUCOSE SERPL-MCNC: 287 MG/DL (ref 65–99)
GLUCOSE SERPL-MCNC: 348 MG/DL (ref 65–99)
GLUCOSE SERPL-MCNC: 358 MG/DL (ref 65–99)
GLUCOSE SERPL-MCNC: 367 MG/DL (ref 65–99)
GLUCOSE SERPL-MCNC: 380 MG/DL (ref 65–99)
GLUCOSE SERPL-MCNC: 382 MG/DL (ref 65–99)
GLUCOSE SERPL-MCNC: 383 MG/DL (ref 65–99)
GLUCOSE SERPL-MCNC: 389 MG/DL (ref 65–99)
GLUCOSE SERPL-MCNC: 400 MG/DL (ref 65–99)
GLUCOSE SERPL-MCNC: 435 MG/DL (ref 65–99)
GLUCOSE SERPL-MCNC: 534 MG/DL (ref 65–99)
GLUCOSE SERPL-MCNC: 569 MG/DL (ref 65–99)
GLUCOSE SERPL-MCNC: 628 MG/DL (ref 65–99)
GLUCOSE SERPL-MCNC: 66 MG/DL (ref 65–99)
GLUCOSE SERPL-MCNC: 660 MG/DL (ref 65–99)
GLUCOSE SERPL-MCNC: 663 MG/DL (ref 65–99)
GLUCOSE SERPL-MCNC: 944 MG/DL (ref 65–99)
GLUCOSE SERPL-MCNC: 97 MG/DL (ref 65–99)
GLUCOSE SERPL-MCNC: 99 MG/DL (ref 65–99)
GLUCOSE UR STRIP.AUTO-MCNC: 100 MG/DL
GLUCOSE UR STRIP.AUTO-MCNC: 250 MG/DL
GLUCOSE UR STRIP.AUTO-MCNC: >=1000 MG/DL
GLUCOSE UR STRIP.AUTO-MCNC: >=1000 MG/DL
HBA1C MFR BLD: 14 % (ref 0–5.6)
HCG SERPL QL: NEGATIVE
HCO3 BLDA-SCNC: 2.5 MMOL/L (ref 17–25)
HCO3 BLDA-SCNC: ABNORMAL MMOL/L (ref 17–25)
HCO3 BLDV-SCNC: 4 MMOL/L (ref 24–28)
HCT VFR BLD AUTO: 27.4 % (ref 37–47)
HCT VFR BLD AUTO: 28.3 % (ref 37–47)
HCT VFR BLD AUTO: 29.8 % (ref 37–47)
HCT VFR BLD AUTO: 31.1 % (ref 37–47)
HCT VFR BLD AUTO: 31.4 % (ref 37–47)
HCT VFR BLD AUTO: 31.8 % (ref 37–47)
HCT VFR BLD AUTO: 31.9 % (ref 37–47)
HCT VFR BLD AUTO: 32 % (ref 37–47)
HCT VFR BLD AUTO: 32.5 % (ref 37–47)
HCT VFR BLD AUTO: 33.5 % (ref 37–47)
HCT VFR BLD AUTO: 34.1 % (ref 37–47)
HCT VFR BLD AUTO: 34.4 % (ref 37–47)
HCT VFR BLD AUTO: 34.9 % (ref 37–47)
HCT VFR BLD AUTO: 35.9 % (ref 37–47)
HCT VFR BLD AUTO: 35.9 % (ref 37–47)
HCT VFR BLD AUTO: 36.8 % (ref 37–47)
HCT VFR BLD AUTO: 41.1 % (ref 37–47)
HCT VFR BLD AUTO: 41.5 % (ref 37–47)
HCT VFR BLD AUTO: 43.4 % (ref 37–47)
HCT VFR BLD AUTO: 47.2 % (ref 37–47)
HCT VFR BLD CALC: 39 % (ref 37–47)
HEMOCCULT SP1 SPEC QL: POSITIVE
HGB BLD-MCNC: 10 G/DL (ref 12–16)
HGB BLD-MCNC: 10.2 G/DL (ref 12–16)
HGB BLD-MCNC: 10.2 G/DL (ref 12–16)
HGB BLD-MCNC: 10.6 G/DL (ref 12–16)
HGB BLD-MCNC: 10.6 G/DL (ref 12–16)
HGB BLD-MCNC: 10.8 G/DL (ref 12–16)
HGB BLD-MCNC: 10.9 G/DL (ref 12–16)
HGB BLD-MCNC: 11.1 G/DL (ref 12–16)
HGB BLD-MCNC: 11.2 G/DL (ref 12–16)
HGB BLD-MCNC: 11.4 G/DL (ref 12–16)
HGB BLD-MCNC: 12.1 G/DL (ref 12–16)
HGB BLD-MCNC: 12.5 G/DL (ref 12–16)
HGB BLD-MCNC: 12.8 G/DL (ref 12–16)
HGB BLD-MCNC: 13.3 G/DL (ref 12–16)
HGB BLD-MCNC: 13.4 G/DL (ref 12–16)
HGB BLD-MCNC: 8.6 G/DL (ref 12–16)
HGB BLD-MCNC: 8.9 G/DL (ref 12–16)
HGB BLD-MCNC: 9.6 G/DL (ref 12–16)
HGB BLD-MCNC: 9.9 G/DL (ref 12–16)
HOROWITZ INDEX BLDA+IHG-RTO: 519 MM[HG]
HOROWITZ INDEX BLDA+IHG-RTO: 543 MM[HG]
HYALINE CASTS #/AREA URNS LPF: ABNORMAL /LPF
HYALINE CASTS #/AREA URNS LPF: ABNORMAL /LPF
IMM GRANULOCYTES # BLD AUTO: 0.01 K/UL (ref 0–0.11)
IMM GRANULOCYTES # BLD AUTO: 0.01 K/UL (ref 0–0.11)
IMM GRANULOCYTES # BLD AUTO: 0.02 K/UL (ref 0–0.11)
IMM GRANULOCYTES # BLD AUTO: 0.03 K/UL (ref 0–0.11)
IMM GRANULOCYTES # BLD AUTO: 0.05 K/UL (ref 0–0.11)
IMM GRANULOCYTES # BLD AUTO: 0.06 K/UL (ref 0–0.11)
IMM GRANULOCYTES # BLD AUTO: 0.11 K/UL (ref 0–0.11)
IMM GRANULOCYTES # BLD AUTO: 0.18 K/UL (ref 0–0.11)
IMM GRANULOCYTES # BLD AUTO: 0.4 K/UL (ref 0–0.11)
IMM GRANULOCYTES # BLD AUTO: 0.46 K/UL (ref 0–0.11)
IMM GRANULOCYTES NFR BLD AUTO: 0.2 % (ref 0–0.9)
IMM GRANULOCYTES NFR BLD AUTO: 0.2 % (ref 0–0.9)
IMM GRANULOCYTES NFR BLD AUTO: 0.3 % (ref 0–0.9)
IMM GRANULOCYTES NFR BLD AUTO: 0.4 % (ref 0–0.9)
IMM GRANULOCYTES NFR BLD AUTO: 0.5 % (ref 0–0.9)
IMM GRANULOCYTES NFR BLD AUTO: 0.5 % (ref 0–0.9)
IMM GRANULOCYTES NFR BLD AUTO: 0.6 % (ref 0–0.9)
IMM GRANULOCYTES NFR BLD AUTO: 0.8 % (ref 0–0.9)
IMM GRANULOCYTES NFR BLD AUTO: 0.8 % (ref 0–0.9)
IMM GRANULOCYTES NFR BLD AUTO: 1.1 % (ref 0–0.9)
IMM GRANULOCYTES NFR BLD AUTO: 1.3 % (ref 0–0.9)
IMM GRANULOCYTES NFR BLD AUTO: 1.9 % (ref 0–0.9)
IMM GRANULOCYTES NFR BLD AUTO: 2.7 % (ref 0–0.9)
INR PPP: 1.04 (ref 0.87–1.13)
INR PPP: 1.26 (ref 0.87–1.13)
INST. QUALIFIED PATIENT IIQPT: YES
INST. QUALIFIED PATIENT IIQPT: YES
KETONES UR STRIP.AUTO-MCNC: 40 MG/DL
KETONES UR STRIP.AUTO-MCNC: >=160 MG/DL
KETONES UR STRIP.AUTO-MCNC: >=160 MG/DL
KETONES UR STRIP.AUTO-MCNC: >=80 MG/DL
LACTATE BLD-SCNC: 1.3 MMOL/L (ref 0.5–2)
LACTATE BLD-SCNC: 1.5 MMOL/L (ref 0.5–2)
LACTATE BLD-SCNC: 2 MMOL/L (ref 0.5–2)
LACTATE BLD-SCNC: 3 MMOL/L (ref 0.5–2)
LACTATE BLD-SCNC: 3.3 MMOL/L (ref 0.5–2)
LACTATE BLD-SCNC: 3.4 MMOL/L (ref 0.5–2)
LEUKOCYTE ESTERASE UR QL STRIP.AUTO: NEGATIVE
LIPASE SERPL-CCNC: 13 U/L (ref 11–82)
LIPASE SERPL-CCNC: 15 U/L (ref 11–82)
LIPASE SERPL-CCNC: 26 U/L (ref 11–82)
LYMPHOCYTES # BLD AUTO: 1.29 K/UL (ref 1–4.8)
LYMPHOCYTES # BLD AUTO: 1.54 K/UL (ref 1–4.8)
LYMPHOCYTES # BLD AUTO: 1.56 K/UL (ref 1–4.8)
LYMPHOCYTES # BLD AUTO: 1.66 K/UL (ref 1–4.8)
LYMPHOCYTES # BLD AUTO: 1.71 K/UL (ref 1–4.8)
LYMPHOCYTES # BLD AUTO: 1.73 K/UL (ref 1–4.8)
LYMPHOCYTES # BLD AUTO: 1.81 K/UL (ref 1–4.8)
LYMPHOCYTES # BLD AUTO: 1.87 K/UL (ref 1–4.8)
LYMPHOCYTES # BLD AUTO: 2.07 K/UL (ref 1–4.8)
LYMPHOCYTES # BLD AUTO: 2.09 K/UL (ref 1–4.8)
LYMPHOCYTES # BLD AUTO: 2.1 K/UL (ref 1–4.8)
LYMPHOCYTES # BLD AUTO: 2.17 K/UL (ref 1–4.8)
LYMPHOCYTES # BLD AUTO: 2.25 K/UL (ref 1–4.8)
LYMPHOCYTES # BLD AUTO: 2.47 K/UL (ref 1–4.8)
LYMPHOCYTES # BLD AUTO: 2.5 K/UL (ref 1–4.8)
LYMPHOCYTES # BLD AUTO: 2.53 K/UL (ref 1–4.8)
LYMPHOCYTES # BLD AUTO: 3.58 K/UL (ref 1–4.8)
LYMPHOCYTES # BLD AUTO: 4.05 K/UL (ref 1–4.8)
LYMPHOCYTES NFR BLD: 10.3 % (ref 22–41)
LYMPHOCYTES NFR BLD: 15.1 % (ref 22–41)
LYMPHOCYTES NFR BLD: 16.3 % (ref 22–41)
LYMPHOCYTES NFR BLD: 19.8 % (ref 22–41)
LYMPHOCYTES NFR BLD: 20.3 % (ref 22–41)
LYMPHOCYTES NFR BLD: 24.8 % (ref 22–41)
LYMPHOCYTES NFR BLD: 25.5 % (ref 22–41)
LYMPHOCYTES NFR BLD: 25.7 % (ref 22–41)
LYMPHOCYTES NFR BLD: 26.6 % (ref 22–41)
LYMPHOCYTES NFR BLD: 30.2 % (ref 22–41)
LYMPHOCYTES NFR BLD: 31.2 % (ref 22–41)
LYMPHOCYTES NFR BLD: 33.1 % (ref 22–41)
LYMPHOCYTES NFR BLD: 38 % (ref 22–41)
LYMPHOCYTES NFR BLD: 40.2 % (ref 22–41)
LYMPHOCYTES NFR BLD: 40.4 % (ref 22–41)
LYMPHOCYTES NFR BLD: 41.2 % (ref 22–41)
LYMPHOCYTES NFR BLD: 43.9 % (ref 22–41)
LYMPHOCYTES NFR BLD: 46.6 % (ref 22–41)
MAGNESIUM SERPL-MCNC: 1.5 MG/DL (ref 1.5–2.5)
MAGNESIUM SERPL-MCNC: 1.6 MG/DL (ref 1.5–2.5)
MAGNESIUM SERPL-MCNC: 1.7 MG/DL (ref 1.5–2.5)
MAGNESIUM SERPL-MCNC: 1.8 MG/DL (ref 1.5–2.5)
MAGNESIUM SERPL-MCNC: 1.9 MG/DL (ref 1.5–2.5)
MAGNESIUM SERPL-MCNC: 2 MG/DL (ref 1.5–2.5)
MAGNESIUM SERPL-MCNC: 2.1 MG/DL (ref 1.5–2.5)
MAGNESIUM SERPL-MCNC: 2.2 MG/DL (ref 1.5–2.5)
MANUAL DIFF BLD: NORMAL
MCH RBC QN AUTO: 23.6 PG (ref 27–33)
MCH RBC QN AUTO: 24.1 PG (ref 27–33)
MCH RBC QN AUTO: 24.2 PG (ref 27–33)
MCH RBC QN AUTO: 24.9 PG (ref 27–33)
MCH RBC QN AUTO: 26 PG (ref 27–33)
MCH RBC QN AUTO: 26.1 PG (ref 27–33)
MCH RBC QN AUTO: 26.2 PG (ref 27–33)
MCH RBC QN AUTO: 26.3 PG (ref 27–33)
MCH RBC QN AUTO: 26.3 PG (ref 27–33)
MCH RBC QN AUTO: 26.4 PG (ref 27–33)
MCH RBC QN AUTO: 26.6 PG (ref 27–33)
MCH RBC QN AUTO: 26.6 PG (ref 27–33)
MCH RBC QN AUTO: 26.8 PG (ref 27–33)
MCH RBC QN AUTO: 27.3 PG (ref 27–33)
MCH RBC QN AUTO: 27.3 PG (ref 27–33)
MCH RBC QN AUTO: 27.4 PG (ref 27–33)
MCH RBC QN AUTO: 27.5 PG (ref 27–33)
MCH RBC QN AUTO: 27.5 PG (ref 27–33)
MCHC RBC AUTO-ENTMCNC: 28.4 G/DL (ref 33.6–35)
MCHC RBC AUTO-ENTMCNC: 29.4 G/DL (ref 33.6–35)
MCHC RBC AUTO-ENTMCNC: 29.5 G/DL (ref 33.6–35)
MCHC RBC AUTO-ENTMCNC: 30.1 G/DL (ref 33.6–35)
MCHC RBC AUTO-ENTMCNC: 30.4 G/DL (ref 33.6–35)
MCHC RBC AUTO-ENTMCNC: 30.5 G/DL (ref 33.6–35)
MCHC RBC AUTO-ENTMCNC: 30.9 G/DL (ref 33.6–35)
MCHC RBC AUTO-ENTMCNC: 31.1 G/DL (ref 33.6–35)
MCHC RBC AUTO-ENTMCNC: 31.1 G/DL (ref 33.6–35)
MCHC RBC AUTO-ENTMCNC: 31.4 G/DL (ref 33.6–35)
MCHC RBC AUTO-ENTMCNC: 31.5 G/DL (ref 33.6–35)
MCHC RBC AUTO-ENTMCNC: 31.9 G/DL (ref 33.6–35)
MCHC RBC AUTO-ENTMCNC: 32 G/DL (ref 33.6–35)
MCHC RBC AUTO-ENTMCNC: 32.2 G/DL (ref 33.6–35)
MCHC RBC AUTO-ENTMCNC: 32.7 G/DL (ref 33.6–35)
MCHC RBC AUTO-ENTMCNC: 33.2 G/DL (ref 33.6–35)
MCV RBC AUTO: 77.4 FL (ref 81.4–97.8)
MCV RBC AUTO: 77.4 FL (ref 81.4–97.8)
MCV RBC AUTO: 77.9 FL (ref 81.4–97.8)
MCV RBC AUTO: 82.9 FL (ref 81.4–97.8)
MCV RBC AUTO: 83.3 FL (ref 81.4–97.8)
MCV RBC AUTO: 83.5 FL (ref 81.4–97.8)
MCV RBC AUTO: 83.7 FL (ref 81.4–97.8)
MCV RBC AUTO: 83.9 FL (ref 81.4–97.8)
MCV RBC AUTO: 84.4 FL (ref 81.4–97.8)
MCV RBC AUTO: 84.7 FL (ref 81.4–97.8)
MCV RBC AUTO: 84.9 FL (ref 81.4–97.8)
MCV RBC AUTO: 85.6 FL (ref 81.4–97.8)
MCV RBC AUTO: 85.7 FL (ref 81.4–97.8)
MCV RBC AUTO: 86.3 FL (ref 81.4–97.8)
MCV RBC AUTO: 87.7 FL (ref 81.4–97.8)
MCV RBC AUTO: 87.8 FL (ref 81.4–97.8)
MCV RBC AUTO: 92.8 FL (ref 81.4–97.8)
MCV RBC AUTO: 93.1 FL (ref 81.4–97.8)
METAMYELOCYTES NFR BLD MANUAL: 1.8 %
METHADONE UR QL SCN: NEGATIVE
METHADONE UR QL SCN: NEGATIVE
MICRO URNS: ABNORMAL
MICROCYTES BLD QL SMEAR: ABNORMAL
MONOCYTES # BLD AUTO: 0.3 K/UL (ref 0–0.85)
MONOCYTES # BLD AUTO: 0.36 K/UL (ref 0–0.85)
MONOCYTES # BLD AUTO: 0.36 K/UL (ref 0–0.85)
MONOCYTES # BLD AUTO: 0.38 K/UL (ref 0–0.85)
MONOCYTES # BLD AUTO: 0.39 K/UL (ref 0–0.85)
MONOCYTES # BLD AUTO: 0.44 K/UL (ref 0–0.85)
MONOCYTES # BLD AUTO: 0.53 K/UL (ref 0–0.85)
MONOCYTES # BLD AUTO: 0.56 K/UL (ref 0–0.85)
MONOCYTES # BLD AUTO: 0.61 K/UL (ref 0–0.85)
MONOCYTES # BLD AUTO: 0.63 K/UL (ref 0–0.85)
MONOCYTES # BLD AUTO: 0.65 K/UL (ref 0–0.85)
MONOCYTES # BLD AUTO: 0.78 K/UL (ref 0–0.85)
MONOCYTES # BLD AUTO: 0.83 K/UL (ref 0–0.85)
MONOCYTES # BLD AUTO: 1.04 K/UL (ref 0–0.85)
MONOCYTES # BLD AUTO: 1.17 K/UL (ref 0–0.85)
MONOCYTES # BLD AUTO: 1.28 K/UL (ref 0–0.85)
MONOCYTES # BLD AUTO: 1.3 K/UL (ref 0–0.85)
MONOCYTES # BLD AUTO: 1.64 K/UL (ref 0–0.85)
MONOCYTES NFR BLD AUTO: 10.1 % (ref 0–13.4)
MONOCYTES NFR BLD AUTO: 10.2 % (ref 0–13.4)
MONOCYTES NFR BLD AUTO: 11 % (ref 0–13.4)
MONOCYTES NFR BLD AUTO: 12.3 % (ref 0–13.4)
MONOCYTES NFR BLD AUTO: 4.7 % (ref 0–13.4)
MONOCYTES NFR BLD AUTO: 6.7 % (ref 0–13.4)
MONOCYTES NFR BLD AUTO: 6.7 % (ref 0–13.4)
MONOCYTES NFR BLD AUTO: 7.2 % (ref 0–13.4)
MONOCYTES NFR BLD AUTO: 7.2 % (ref 0–13.4)
MONOCYTES NFR BLD AUTO: 7.7 % (ref 0–13.4)
MONOCYTES NFR BLD AUTO: 8 % (ref 0–13.4)
MONOCYTES NFR BLD AUTO: 8.5 % (ref 0–13.4)
MONOCYTES NFR BLD AUTO: 8.9 % (ref 0–13.4)
MONOCYTES NFR BLD AUTO: 9.3 % (ref 0–13.4)
MONOCYTES NFR BLD AUTO: 9.4 % (ref 0–13.4)
MONOCYTES NFR BLD AUTO: 9.5 % (ref 0–13.4)
MONOCYTES NFR BLD AUTO: 9.7 % (ref 0–13.4)
MONOCYTES NFR BLD AUTO: 9.7 % (ref 0–13.4)
MORPHOLOGY BLD-IMP: NORMAL
MYELOCYTES NFR BLD MANUAL: 0.9 %
NEUTROPHILS # BLD AUTO: 1.74 K/UL (ref 2–7.15)
NEUTROPHILS # BLD AUTO: 10.58 K/UL (ref 2–7.15)
NEUTROPHILS # BLD AUTO: 11.91 K/UL (ref 2–7.15)
NEUTROPHILS # BLD AUTO: 13.35 K/UL (ref 2–7.15)
NEUTROPHILS # BLD AUTO: 18.98 K/UL (ref 2–7.15)
NEUTROPHILS # BLD AUTO: 2 K/UL (ref 2–7.15)
NEUTROPHILS # BLD AUTO: 2.15 K/UL (ref 2–7.15)
NEUTROPHILS # BLD AUTO: 2.27 K/UL (ref 2–7.15)
NEUTROPHILS # BLD AUTO: 2.31 K/UL (ref 2–7.15)
NEUTROPHILS # BLD AUTO: 2.64 K/UL (ref 2–7.15)
NEUTROPHILS # BLD AUTO: 2.97 K/UL (ref 2–7.15)
NEUTROPHILS # BLD AUTO: 3.36 K/UL (ref 2–7.15)
NEUTROPHILS # BLD AUTO: 3.79 K/UL (ref 2–7.15)
NEUTROPHILS # BLD AUTO: 4 K/UL (ref 2–7.15)
NEUTROPHILS # BLD AUTO: 4.7 K/UL (ref 2–7.15)
NEUTROPHILS # BLD AUTO: 5.02 K/UL (ref 2–7.15)
NEUTROPHILS # BLD AUTO: 5.1 K/UL (ref 2–7.15)
NEUTROPHILS # BLD AUTO: 5.63 K/UL (ref 2–7.15)
NEUTROPHILS NFR BLD: 40.9 % (ref 44–72)
NEUTROPHILS NFR BLD: 42.3 % (ref 44–72)
NEUTROPHILS NFR BLD: 46.7 % (ref 44–72)
NEUTROPHILS NFR BLD: 47.9 % (ref 44–72)
NEUTROPHILS NFR BLD: 48.4 % (ref 44–72)
NEUTROPHILS NFR BLD: 48.4 % (ref 44–72)
NEUTROPHILS NFR BLD: 52.9 % (ref 44–72)
NEUTROPHILS NFR BLD: 56 % (ref 44–72)
NEUTROPHILS NFR BLD: 57.6 % (ref 44–72)
NEUTROPHILS NFR BLD: 60.4 % (ref 44–72)
NEUTROPHILS NFR BLD: 62.6 % (ref 44–72)
NEUTROPHILS NFR BLD: 63.4 % (ref 44–72)
NEUTROPHILS NFR BLD: 63.7 % (ref 44–72)
NEUTROPHILS NFR BLD: 64.9 % (ref 44–72)
NEUTROPHILS NFR BLD: 68.9 % (ref 44–72)
NEUTROPHILS NFR BLD: 70.8 % (ref 44–72)
NEUTROPHILS NFR BLD: 76.3 % (ref 44–72)
NEUTROPHILS NFR BLD: 80.4 % (ref 44–72)
NEUTS BAND NFR BLD MANUAL: 0.9 % (ref 0–10)
NITRITE UR QL STRIP.AUTO: NEGATIVE
NRBC # BLD AUTO: 0 K/UL
NRBC BLD-RTO: 0 /100 WBC
O2/TOTAL GAS SETTING VFR VENT: 21 %
O2/TOTAL GAS SETTING VFR VENT: 21 %
OPIATES UR QL SCN: POSITIVE
OPIATES UR QL SCN: POSITIVE
OXYCODONE UR QL SCN: NEGATIVE
OXYCODONE UR QL SCN: NEGATIVE
PCO2 BLDA: 10.9 MMHG (ref 26–37)
PCO2 BLDA: <10 MMHG (ref 26–37)
PCO2 BLDV: 14.3 MMHG (ref 41–51)
PCO2 TEMP ADJ BLDA: 10.7 MMHG (ref 26–37)
PCO2 TEMP ADJ BLDA: ABNORMAL MMHG (ref 26–37)
PCP UR QL SCN: NEGATIVE
PCP UR QL SCN: NEGATIVE
PH BLDA: 6.96 [PH] (ref 7.4–7.5)
PH BLDA: 7.01 [PH] (ref 7.4–7.5)
PH BLDV: 7.06 [PH] (ref 7.31–7.45)
PH TEMP ADJ BLDA: 6.97 [PH] (ref 7.4–7.5)
PH TEMP ADJ BLDA: 7.01 [PH] (ref 7.4–7.5)
PH UR STRIP.AUTO: 5 [PH]
PH UR STRIP.AUTO: 5 [PH] (ref 5–8)
PH UR STRIP.AUTO: 5.5 [PH]
PH UR STRIP.AUTO: 5.5 [PH] (ref 5–8)
PHOSPHATE SERPL-MCNC: 1 MG/DL (ref 2.5–4.5)
PHOSPHATE SERPL-MCNC: 1 MG/DL (ref 2.5–4.5)
PHOSPHATE SERPL-MCNC: 1.1 MG/DL (ref 2.5–4.5)
PHOSPHATE SERPL-MCNC: 1.2 MG/DL (ref 2.5–4.5)
PHOSPHATE SERPL-MCNC: 1.3 MG/DL (ref 2.5–4.5)
PHOSPHATE SERPL-MCNC: 1.3 MG/DL (ref 2.5–4.5)
PHOSPHATE SERPL-MCNC: 1.6 MG/DL (ref 2.5–4.5)
PHOSPHATE SERPL-MCNC: 1.6 MG/DL (ref 2.5–4.5)
PHOSPHATE SERPL-MCNC: 1.7 MG/DL (ref 2.5–4.5)
PHOSPHATE SERPL-MCNC: 1.9 MG/DL (ref 2.5–4.5)
PHOSPHATE SERPL-MCNC: 1.9 MG/DL (ref 2.5–4.5)
PHOSPHATE SERPL-MCNC: 2 MG/DL (ref 2.5–4.5)
PHOSPHATE SERPL-MCNC: 2.1 MG/DL (ref 2.5–4.5)
PHOSPHATE SERPL-MCNC: 2.3 MG/DL (ref 2.5–4.5)
PHOSPHATE SERPL-MCNC: 2.5 MG/DL (ref 2.5–4.5)
PHOSPHATE SERPL-MCNC: 2.6 MG/DL (ref 2.5–4.5)
PHOSPHATE SERPL-MCNC: 2.6 MG/DL (ref 2.5–4.5)
PHOSPHATE SERPL-MCNC: 2.9 MG/DL (ref 2.5–4.5)
PHOSPHATE SERPL-MCNC: 3 MG/DL (ref 2.5–4.5)
PHOSPHATE SERPL-MCNC: 3.3 MG/DL (ref 2.5–4.5)
PHOSPHATE SERPL-MCNC: 3.4 MG/DL (ref 2.5–4.5)
PHOSPHATE SERPL-MCNC: 3.5 MG/DL (ref 2.5–4.5)
PHOSPHATE SERPL-MCNC: 3.5 MG/DL (ref 2.5–4.5)
PHOSPHATE SERPL-MCNC: 4.2 MG/DL (ref 2.5–4.5)
PHOSPHATE SERPL-MCNC: 4.3 MG/DL (ref 2.5–4.5)
PHOSPHATE SERPL-MCNC: 5 MG/DL (ref 2.5–4.5)
PHOSPHATE SERPL-MCNC: 6.4 MG/DL (ref 2.5–4.5)
PHOSPHATE SERPL-MCNC: 7.2 MG/DL (ref 2.5–4.5)
PHOSPHATE SERPL-MCNC: <1 MG/DL (ref 2.5–4.5)
PHOSPHATE SERPL-MCNC: <1 MG/DL (ref 2.5–4.5)
PLATELET # BLD AUTO: 149 K/UL (ref 164–446)
PLATELET # BLD AUTO: 160 K/UL (ref 164–446)
PLATELET # BLD AUTO: 175 K/UL (ref 164–446)
PLATELET # BLD AUTO: 178 K/UL (ref 164–446)
PLATELET # BLD AUTO: 187 K/UL (ref 164–446)
PLATELET # BLD AUTO: 193 K/UL (ref 164–446)
PLATELET # BLD AUTO: 196 K/UL (ref 164–446)
PLATELET # BLD AUTO: 197 K/UL (ref 164–446)
PLATELET # BLD AUTO: 198 K/UL (ref 164–446)
PLATELET # BLD AUTO: 200 K/UL (ref 164–446)
PLATELET # BLD AUTO: 200 K/UL (ref 164–446)
PLATELET # BLD AUTO: 212 K/UL (ref 164–446)
PLATELET # BLD AUTO: 216 K/UL (ref 164–446)
PLATELET # BLD AUTO: 225 K/UL (ref 164–446)
PLATELET # BLD AUTO: 231 K/UL (ref 164–446)
PLATELET # BLD AUTO: 233 K/UL (ref 164–446)
PLATELET # BLD AUTO: 240 K/UL (ref 164–446)
PLATELET # BLD AUTO: 283 K/UL (ref 164–446)
PLATELET # BLD AUTO: 364 K/UL (ref 164–446)
PLATELET # BLD AUTO: 470 K/UL (ref 164–446)
PLATELET BLD QL SMEAR: NORMAL
PMV BLD AUTO: 10.1 FL (ref 9–12.9)
PMV BLD AUTO: 10.2 FL (ref 9–12.9)
PMV BLD AUTO: 10.2 FL (ref 9–12.9)
PMV BLD AUTO: 10.3 FL (ref 9–12.9)
PMV BLD AUTO: 10.3 FL (ref 9–12.9)
PMV BLD AUTO: 10.4 FL (ref 9–12.9)
PMV BLD AUTO: 10.5 FL (ref 9–12.9)
PMV BLD AUTO: 10.8 FL (ref 9–12.9)
PMV BLD AUTO: 11 FL (ref 9–12.9)
PMV BLD AUTO: 9.3 FL (ref 9–12.9)
PMV BLD AUTO: 9.6 FL (ref 9–12.9)
PMV BLD AUTO: 9.7 FL (ref 9–12.9)
PMV BLD AUTO: 9.8 FL (ref 9–12.9)
PMV BLD AUTO: 9.9 FL (ref 9–12.9)
PO2 BLDA: 109 MMHG (ref 64–87)
PO2 BLDA: 114 MMHG (ref 64–87)
PO2 BLDV: 76.8 MMHG (ref 25–40)
PO2 TEMP ADJ BLDA: 107 MMHG (ref 64–87)
PO2 TEMP ADJ BLDA: 111 MMHG (ref 64–87)
POLYCHROMASIA BLD QL SMEAR: NORMAL
POLYCHROMASIA BLD QL SMEAR: NORMAL
POTASSIUM BLD-SCNC: 3.7 MMOL/L (ref 3.6–5.5)
POTASSIUM SERPL-SCNC: 2.7 MMOL/L (ref 3.6–5.5)
POTASSIUM SERPL-SCNC: 3 MMOL/L (ref 3.6–5.5)
POTASSIUM SERPL-SCNC: 3 MMOL/L (ref 3.6–5.5)
POTASSIUM SERPL-SCNC: 3.1 MMOL/L (ref 3.6–5.5)
POTASSIUM SERPL-SCNC: 3.1 MMOL/L (ref 3.6–5.5)
POTASSIUM SERPL-SCNC: 3.2 MMOL/L (ref 3.6–5.5)
POTASSIUM SERPL-SCNC: 3.3 MMOL/L (ref 3.6–5.5)
POTASSIUM SERPL-SCNC: 3.4 MMOL/L (ref 3.6–5.5)
POTASSIUM SERPL-SCNC: 3.5 MMOL/L (ref 3.6–5.5)
POTASSIUM SERPL-SCNC: 3.6 MMOL/L (ref 3.6–5.5)
POTASSIUM SERPL-SCNC: 3.7 MMOL/L (ref 3.6–5.5)
POTASSIUM SERPL-SCNC: 3.8 MMOL/L (ref 3.6–5.5)
POTASSIUM SERPL-SCNC: 3.9 MMOL/L (ref 3.6–5.5)
POTASSIUM SERPL-SCNC: 4 MMOL/L (ref 3.6–5.5)
POTASSIUM SERPL-SCNC: 4.1 MMOL/L (ref 3.6–5.5)
POTASSIUM SERPL-SCNC: 4.2 MMOL/L (ref 3.6–5.5)
POTASSIUM SERPL-SCNC: 4.2 MMOL/L (ref 3.6–5.5)
POTASSIUM SERPL-SCNC: 4.3 MMOL/L (ref 3.6–5.5)
POTASSIUM SERPL-SCNC: 4.4 MMOL/L (ref 3.6–5.5)
POTASSIUM SERPL-SCNC: 4.6 MMOL/L (ref 3.6–5.5)
POTASSIUM SERPL-SCNC: 4.6 MMOL/L (ref 3.6–5.5)
POTASSIUM SERPL-SCNC: 4.7 MMOL/L (ref 3.6–5.5)
POTASSIUM SERPL-SCNC: 4.7 MMOL/L (ref 3.6–5.5)
POTASSIUM SERPL-SCNC: 4.8 MMOL/L (ref 3.6–5.5)
POTASSIUM SERPL-SCNC: 4.8 MMOL/L (ref 3.6–5.5)
POTASSIUM SERPL-SCNC: 4.9 MMOL/L (ref 3.6–5.5)
POTASSIUM SERPL-SCNC: 4.9 MMOL/L (ref 3.6–5.5)
POTASSIUM SERPL-SCNC: 5.6 MMOL/L (ref 3.6–5.5)
POTASSIUM SERPL-SCNC: 5.8 MMOL/L (ref 3.6–5.5)
POTASSIUM SERPL-SCNC: 6.3 MMOL/L (ref 3.6–5.5)
PROPOXYPH UR QL SCN: NEGATIVE
PROPOXYPH UR QL SCN: NEGATIVE
PROT SERPL-MCNC: 4.2 G/DL (ref 6–8.2)
PROT SERPL-MCNC: 5.1 G/DL (ref 6–8.2)
PROT SERPL-MCNC: 5.1 G/DL (ref 6–8.2)
PROT SERPL-MCNC: 5.3 G/DL (ref 6–8.2)
PROT SERPL-MCNC: 6 G/DL (ref 6–8.2)
PROT SERPL-MCNC: 6 G/DL (ref 6–8.2)
PROT SERPL-MCNC: 7.3 G/DL (ref 6–8.2)
PROT SERPL-MCNC: 8 G/DL (ref 6–8.2)
PROT UR QL STRIP: 100 MG/DL
PROT UR QL STRIP: 100 MG/DL
PROT UR QL STRIP: NEGATIVE MG/DL
PROT UR QL STRIP: NEGATIVE MG/DL
PROTHROMBIN TIME: 13.8 SEC (ref 12–14.6)
PROTHROMBIN TIME: 16.1 SEC (ref 12–14.6)
RBC # BLD AUTO: 3.28 M/UL (ref 4.2–5.4)
RBC # BLD AUTO: 3.38 M/UL (ref 4.2–5.4)
RBC # BLD AUTO: 3.54 M/UL (ref 4.2–5.4)
RBC # BLD AUTO: 3.62 M/UL (ref 4.2–5.4)
RBC # BLD AUTO: 3.72 M/UL (ref 4.2–5.4)
RBC # BLD AUTO: 3.84 M/UL (ref 4.2–5.4)
RBC # BLD AUTO: 3.85 M/UL (ref 4.2–5.4)
RBC # BLD AUTO: 3.91 M/UL (ref 4.2–5.4)
RBC # BLD AUTO: 4.02 M/UL (ref 4.2–5.4)
RBC # BLD AUTO: 4.1 M/UL (ref 4.2–5.4)
RBC # BLD AUTO: 4.16 M/UL (ref 4.2–5.4)
RBC # BLD AUTO: 4.16 M/UL (ref 4.2–5.4)
RBC # BLD AUTO: 4.2 M/UL (ref 4.2–5.4)
RBC # BLD AUTO: 4.23 M/UL (ref 4.2–5.4)
RBC # BLD AUTO: 4.3 M/UL (ref 4.2–5.4)
RBC # BLD AUTO: 4.38 M/UL (ref 4.2–5.4)
RBC # BLD AUTO: 4.43 M/UL (ref 4.2–5.4)
RBC # BLD AUTO: 4.66 M/UL (ref 4.2–5.4)
RBC # BLD AUTO: 4.73 M/UL (ref 4.2–5.4)
RBC # BLD AUTO: 5.57 M/UL (ref 4.2–5.4)
RBC # URNS HPF: ABNORMAL /HPF
RBC # URNS HPF: ABNORMAL /HPF
RBC BLD AUTO: PRESENT
RBC UR QL AUTO: ABNORMAL
RBC UR QL AUTO: NEGATIVE
SAO2 % BLDA: 95 % (ref 93–99)
SAO2 % BLDA: ABNORMAL % (ref 93–99)
SAO2 % BLDV: 88.1 %
SIGNIFICANT IND 70042: NORMAL
SITE SITE: NORMAL
SODIUM BLD-SCNC: 146 MMOL/L (ref 135–145)
SODIUM SERPL-SCNC: 128 MMOL/L (ref 135–145)
SODIUM SERPL-SCNC: 134 MMOL/L (ref 135–145)
SODIUM SERPL-SCNC: 134 MMOL/L (ref 135–145)
SODIUM SERPL-SCNC: 135 MMOL/L (ref 135–145)
SODIUM SERPL-SCNC: 135 MMOL/L (ref 135–145)
SODIUM SERPL-SCNC: 136 MMOL/L (ref 135–145)
SODIUM SERPL-SCNC: 136 MMOL/L (ref 135–145)
SODIUM SERPL-SCNC: 137 MMOL/L (ref 135–145)
SODIUM SERPL-SCNC: 138 MMOL/L (ref 135–145)
SODIUM SERPL-SCNC: 139 MMOL/L (ref 135–145)
SODIUM SERPL-SCNC: 140 MMOL/L (ref 135–145)
SODIUM SERPL-SCNC: 141 MMOL/L (ref 135–145)
SODIUM SERPL-SCNC: 142 MMOL/L (ref 135–145)
SODIUM SERPL-SCNC: 143 MMOL/L (ref 135–145)
SODIUM SERPL-SCNC: 144 MMOL/L (ref 135–145)
SODIUM SERPL-SCNC: 145 MMOL/L (ref 135–145)
SODIUM SERPL-SCNC: 146 MMOL/L (ref 135–145)
SODIUM SERPL-SCNC: 147 MMOL/L (ref 135–145)
SODIUM SERPL-SCNC: 148 MMOL/L (ref 135–145)
SODIUM SERPL-SCNC: 149 MMOL/L (ref 135–145)
SODIUM SERPL-SCNC: 150 MMOL/L (ref 135–145)
SOURCE SOURCE: NORMAL
SP GR UR STRIP.AUTO: 1.02
SP GR UR STRIP.AUTO: 1.02
SP GR UR STRIP.AUTO: 1.03
SP GR UR STRIP.AUTO: 1.03
SPECIMEN DRAWN FROM PATIENT: ABNORMAL
SPECIMEN DRAWN FROM PATIENT: ABNORMAL
TROPONIN T SERPL-MCNC: <6 NG/L (ref 6–19)
UROBILINOGEN UR STRIP.AUTO-MCNC: 0.2 MG/DL
WBC # BLD AUTO: 14.9 K/UL (ref 4.8–10.8)
WBC # BLD AUTO: 16.6 K/UL (ref 4.8–10.8)
WBC # BLD AUTO: 18.1 K/UL (ref 4.8–10.8)
WBC # BLD AUTO: 24.9 K/UL (ref 4.8–10.8)
WBC # BLD AUTO: 4.1 K/UL (ref 4.8–10.8)
WBC # BLD AUTO: 4.3 K/UL (ref 4.8–10.8)
WBC # BLD AUTO: 4.3 K/UL (ref 4.8–10.8)
WBC # BLD AUTO: 4.5 K/UL (ref 4.8–10.8)
WBC # BLD AUTO: 5.4 K/UL (ref 4.8–10.8)
WBC # BLD AUTO: 5.5 K/UL (ref 4.8–10.8)
WBC # BLD AUTO: 6.1 K/UL (ref 4.8–10.8)
WBC # BLD AUTO: 6.1 K/UL (ref 4.8–10.8)
WBC # BLD AUTO: 6.3 K/UL (ref 4.8–10.8)
WBC # BLD AUTO: 6.4 K/UL (ref 4.8–10.8)
WBC # BLD AUTO: 7.9 K/UL (ref 4.8–10.8)
WBC # BLD AUTO: 8.1 K/UL (ref 4.8–10.8)
WBC # BLD AUTO: 8.2 K/UL (ref 4.8–10.8)
WBC # BLD AUTO: 8.2 K/UL (ref 4.8–10.8)
WBC # BLD AUTO: 8.4 K/UL (ref 4.8–10.8)
WBC # BLD AUTO: 8.9 K/UL (ref 4.8–10.8)
WBC #/AREA URNS HPF: ABNORMAL /HPF
WBC #/AREA URNS HPF: ABNORMAL /HPF
YEAST BUDDING URNS QL: PRESENT /HPF

## 2019-01-01 PROCEDURE — 700105 HCHG RX REV CODE 258: Performed by: INTERNAL MEDICINE

## 2019-01-01 PROCEDURE — 80048 BASIC METABOLIC PNL TOTAL CA: CPT

## 2019-01-01 PROCEDURE — 96367 TX/PROPH/DG ADDL SEQ IV INF: CPT

## 2019-01-01 PROCEDURE — 700102 HCHG RX REV CODE 250 W/ 637 OVERRIDE(OP): Performed by: INTERNAL MEDICINE

## 2019-01-01 PROCEDURE — 87040 BLOOD CULTURE FOR BACTERIA: CPT | Mod: 91

## 2019-01-01 PROCEDURE — 700111 HCHG RX REV CODE 636 W/ 250 OVERRIDE (IP): Performed by: INTERNAL MEDICINE

## 2019-01-01 PROCEDURE — 83735 ASSAY OF MAGNESIUM: CPT

## 2019-01-01 PROCEDURE — C1751 CATH, INF, PER/CENT/MIDLINE: HCPCS

## 2019-01-01 PROCEDURE — 700101 HCHG RX REV CODE 250: Performed by: INTERNAL MEDICINE

## 2019-01-01 PROCEDURE — 99291 CRITICAL CARE FIRST HOUR: CPT

## 2019-01-01 PROCEDURE — 700111 HCHG RX REV CODE 636 W/ 250 OVERRIDE (IP): Performed by: STUDENT IN AN ORGANIZED HEALTH CARE EDUCATION/TRAINING PROGRAM

## 2019-01-01 PROCEDURE — 700101 HCHG RX REV CODE 250: Performed by: HOSPITALIST

## 2019-01-01 PROCEDURE — 700105 HCHG RX REV CODE 258: Performed by: EMERGENCY MEDICINE

## 2019-01-01 PROCEDURE — 85025 COMPLETE CBC W/AUTO DIFF WBC: CPT

## 2019-01-01 PROCEDURE — 99233 SBSQ HOSP IP/OBS HIGH 50: CPT | Performed by: HOSPITALIST

## 2019-01-01 PROCEDURE — 84100 ASSAY OF PHOSPHORUS: CPT

## 2019-01-01 PROCEDURE — 76937 US GUIDE VASCULAR ACCESS: CPT

## 2019-01-01 PROCEDURE — A9270 NON-COVERED ITEM OR SERVICE: HCPCS | Performed by: INTERNAL MEDICINE

## 2019-01-01 PROCEDURE — 700101 HCHG RX REV CODE 250: Performed by: STUDENT IN AN ORGANIZED HEALTH CARE EDUCATION/TRAINING PROGRAM

## 2019-01-01 PROCEDURE — 51798 US URINE CAPACITY MEASURE: CPT

## 2019-01-01 PROCEDURE — 36556 INSERT NON-TUNNEL CV CATH: CPT

## 2019-01-01 PROCEDURE — 700102 HCHG RX REV CODE 250 W/ 637 OVERRIDE(OP): Performed by: HOSPITALIST

## 2019-01-01 PROCEDURE — 85730 THROMBOPLASTIN TIME PARTIAL: CPT

## 2019-01-01 PROCEDURE — 81002 URINALYSIS NONAUTO W/O SCOPE: CPT | Performed by: EMERGENCY MEDICINE

## 2019-01-01 PROCEDURE — 82962 GLUCOSE BLOOD TEST: CPT | Mod: 91

## 2019-01-01 PROCEDURE — 93005 ELECTROCARDIOGRAM TRACING: CPT | Performed by: INTERNAL MEDICINE

## 2019-01-01 PROCEDURE — 700111 HCHG RX REV CODE 636 W/ 250 OVERRIDE (IP): Performed by: HOSPITALIST

## 2019-01-01 PROCEDURE — 84132 ASSAY OF SERUM POTASSIUM: CPT

## 2019-01-01 PROCEDURE — 99291 CRITICAL CARE FIRST HOUR: CPT | Performed by: INTERNAL MEDICINE

## 2019-01-01 PROCEDURE — 770022 HCHG ROOM/CARE - ICU (200)

## 2019-01-01 PROCEDURE — 96365 THER/PROPH/DIAG IV INF INIT: CPT

## 2019-01-01 PROCEDURE — 80053 COMPREHEN METABOLIC PANEL: CPT

## 2019-01-01 PROCEDURE — 81003 URINALYSIS AUTO W/O SCOPE: CPT

## 2019-01-01 PROCEDURE — 700105 HCHG RX REV CODE 258: Performed by: HOSPITALIST

## 2019-01-01 PROCEDURE — 93005 ELECTROCARDIOGRAM TRACING: CPT | Performed by: EMERGENCY MEDICINE

## 2019-01-01 PROCEDURE — 74018 RADEX ABDOMEN 1 VIEW: CPT

## 2019-01-01 PROCEDURE — 99223 1ST HOSP IP/OBS HIGH 75: CPT | Performed by: INTERNAL MEDICINE

## 2019-01-01 PROCEDURE — 96376 TX/PRO/DX INJ SAME DRUG ADON: CPT

## 2019-01-01 PROCEDURE — 82533 TOTAL CORTISOL: CPT

## 2019-01-01 PROCEDURE — 700105 HCHG RX REV CODE 258: Performed by: STUDENT IN AN ORGANIZED HEALTH CARE EDUCATION/TRAINING PROGRAM

## 2019-01-01 PROCEDURE — 80048 BASIC METABOLIC PNL TOTAL CA: CPT | Mod: 91

## 2019-01-01 PROCEDURE — B5181ZA FLUOROSCOPY OF SUPERIOR VENA CAVA USING LOW OSMOLAR CONTRAST, GUIDANCE: ICD-10-PCS | Performed by: RADIOLOGY

## 2019-01-01 PROCEDURE — 99291 CRITICAL CARE FIRST HOUR: CPT | Mod: 25 | Performed by: INTERNAL MEDICINE

## 2019-01-01 PROCEDURE — 94760 N-INVAS EAR/PLS OXIMETRY 1: CPT

## 2019-01-01 PROCEDURE — 83036 HEMOGLOBIN GLYCOSYLATED A1C: CPT

## 2019-01-01 PROCEDURE — 82010 KETONE BODYS QUAN: CPT

## 2019-01-01 PROCEDURE — 99292 CRITICAL CARE ADDL 30 MIN: CPT | Mod: 25 | Performed by: INTERNAL MEDICINE

## 2019-01-01 PROCEDURE — C9113 INJ PANTOPRAZOLE SODIUM, VIA: HCPCS | Performed by: HOSPITALIST

## 2019-01-01 PROCEDURE — 84484 ASSAY OF TROPONIN QUANT: CPT

## 2019-01-01 PROCEDURE — 83735 ASSAY OF MAGNESIUM: CPT | Mod: 91

## 2019-01-01 PROCEDURE — 82962 GLUCOSE BLOOD TEST: CPT

## 2019-01-01 PROCEDURE — 84703 CHORIONIC GONADOTROPIN ASSAY: CPT

## 2019-01-01 PROCEDURE — 700111 HCHG RX REV CODE 636 W/ 250 OVERRIDE (IP): Performed by: EMERGENCY MEDICINE

## 2019-01-01 PROCEDURE — 85379 FIBRIN DEGRADATION QUANT: CPT

## 2019-01-01 PROCEDURE — A9270 NON-COVERED ITEM OR SERVICE: HCPCS | Performed by: HOSPITALIST

## 2019-01-01 PROCEDURE — 80053 COMPREHEN METABOLIC PANEL: CPT | Mod: 91

## 2019-01-01 PROCEDURE — 99233 SBSQ HOSP IP/OBS HIGH 50: CPT | Performed by: INTERNAL MEDICINE

## 2019-01-01 PROCEDURE — 99232 SBSQ HOSP IP/OBS MODERATE 35: CPT | Performed by: INTERNAL MEDICINE

## 2019-01-01 PROCEDURE — 82271 OCCULT BLOOD OTHER SOURCES: CPT

## 2019-01-01 PROCEDURE — 700105 HCHG RX REV CODE 258

## 2019-01-01 PROCEDURE — 85007 BL SMEAR W/DIFF WBC COUNT: CPT

## 2019-01-01 PROCEDURE — 02HV33Z INSERTION OF INFUSION DEVICE INTO SUPERIOR VENA CAVA, PERCUTANEOUS APPROACH: ICD-10-PCS | Performed by: RADIOLOGY

## 2019-01-01 PROCEDURE — 93010 ELECTROCARDIOGRAM REPORT: CPT | Performed by: INTERNAL MEDICINE

## 2019-01-01 PROCEDURE — 96375 TX/PRO/DX INJ NEW DRUG ADDON: CPT

## 2019-01-01 PROCEDURE — 87086 URINE CULTURE/COLONY COUNT: CPT

## 2019-01-01 PROCEDURE — 02HV33Z INSERTION OF INFUSION DEVICE INTO SUPERIOR VENA CAVA, PERCUTANEOUS APPROACH: ICD-10-PCS | Performed by: INTERNAL MEDICINE

## 2019-01-01 PROCEDURE — 770006 HCHG ROOM/CARE - MED/SURG/GYN SEMI*

## 2019-01-01 PROCEDURE — 87502 INFLUENZA DNA AMP PROBE: CPT

## 2019-01-01 PROCEDURE — 93005 ELECTROCARDIOGRAM TRACING: CPT

## 2019-01-01 PROCEDURE — 85025 COMPLETE CBC W/AUTO DIFF WBC: CPT | Mod: 91

## 2019-01-01 PROCEDURE — 85610 PROTHROMBIN TIME: CPT

## 2019-01-01 PROCEDURE — B244ZZZ ULTRASONOGRAPHY OF RIGHT HEART: ICD-10-PCS | Performed by: INTERNAL MEDICINE

## 2019-01-01 PROCEDURE — 84100 ASSAY OF PHOSPHORUS: CPT | Mod: 91

## 2019-01-01 PROCEDURE — 83605 ASSAY OF LACTIC ACID: CPT | Mod: 91

## 2019-01-01 PROCEDURE — 71045 X-RAY EXAM CHEST 1 VIEW: CPT

## 2019-01-01 PROCEDURE — 700111 HCHG RX REV CODE 636 W/ 250 OVERRIDE (IP)

## 2019-01-01 PROCEDURE — 99254 IP/OBS CNSLTJ NEW/EST MOD 60: CPT | Mod: GC | Performed by: PSYCHIATRY & NEUROLOGY

## 2019-01-01 PROCEDURE — 99223 1ST HOSP IP/OBS HIGH 75: CPT | Performed by: HOSPITALIST

## 2019-01-01 PROCEDURE — 05HY33Z INSERTION OF INFUSION DEVICE INTO UPPER VEIN, PERCUTANEOUS APPROACH: ICD-10-PCS | Performed by: HOSPITALIST

## 2019-01-01 PROCEDURE — 82803 BLOOD GASES ANY COMBINATION: CPT | Mod: 91

## 2019-01-01 PROCEDURE — 93010 ELECTROCARDIOGRAM REPORT: CPT | Mod: 77 | Performed by: INTERNAL MEDICINE

## 2019-01-01 PROCEDURE — 99239 HOSP IP/OBS DSCHRG MGMT >30: CPT | Performed by: INTERNAL MEDICINE

## 2019-01-01 PROCEDURE — 99232 SBSQ HOSP IP/OBS MODERATE 35: CPT | Performed by: HOSPITALIST

## 2019-01-01 PROCEDURE — 81001 URINALYSIS AUTO W/SCOPE: CPT

## 2019-01-01 PROCEDURE — B548ZZA ULTRASONOGRAPHY OF SUPERIOR VENA CAVA, GUIDANCE: ICD-10-PCS | Performed by: RADIOLOGY

## 2019-01-01 PROCEDURE — 0JH60XZ INSERTION OF TUNNELED VASCULAR ACCESS DEVICE INTO CHEST SUBCUTANEOUS TISSUE AND FASCIA, OPEN APPROACH: ICD-10-PCS | Performed by: RADIOLOGY

## 2019-01-01 PROCEDURE — 80307 DRUG TEST PRSMV CHEM ANLYZR: CPT

## 2019-01-01 PROCEDURE — 83690 ASSAY OF LIPASE: CPT

## 2019-01-01 PROCEDURE — 96361 HYDRATE IV INFUSION ADD-ON: CPT

## 2019-01-01 PROCEDURE — 36556 INSERT NON-TUNNEL CV CATH: CPT | Mod: RT | Performed by: INTERNAL MEDICINE

## 2019-01-01 PROCEDURE — 31500 INSERT EMERGENCY AIRWAY: CPT

## 2019-01-01 PROCEDURE — 93005 ELECTROCARDIOGRAM TRACING: CPT | Performed by: STUDENT IN AN ORGANIZED HEALTH CARE EDUCATION/TRAINING PROGRAM

## 2019-01-01 PROCEDURE — 85014 HEMATOCRIT: CPT

## 2019-01-01 PROCEDURE — 02H633Z INSERTION OF INFUSION DEVICE INTO RIGHT ATRIUM, PERCUTANEOUS APPROACH: ICD-10-PCS | Performed by: INTERNAL MEDICINE

## 2019-01-01 PROCEDURE — 700102 HCHG RX REV CODE 250 W/ 637 OVERRIDE(OP): Performed by: STUDENT IN AN ORGANIZED HEALTH CARE EDUCATION/TRAINING PROGRAM

## 2019-01-01 PROCEDURE — 99239 HOSP IP/OBS DSCHRG MGMT >30: CPT | Performed by: HOSPITALIST

## 2019-01-01 PROCEDURE — 36415 COLL VENOUS BLD VENIPUNCTURE: CPT

## 2019-01-01 PROCEDURE — 87040 BLOOD CULTURE FOR BACTERIA: CPT

## 2019-01-01 PROCEDURE — 96368 THER/DIAG CONCURRENT INF: CPT

## 2019-01-01 PROCEDURE — 700102 HCHG RX REV CODE 250 W/ 637 OVERRIDE(OP): Performed by: EMERGENCY MEDICINE

## 2019-01-01 PROCEDURE — 84295 ASSAY OF SERUM SODIUM: CPT

## 2019-01-01 PROCEDURE — C9113 INJ PANTOPRAZOLE SODIUM, VIA: HCPCS | Performed by: INTERNAL MEDICINE

## 2019-01-01 PROCEDURE — 74176 CT ABD & PELVIS W/O CONTRAST: CPT

## 2019-01-01 PROCEDURE — 85027 COMPLETE CBC AUTOMATED: CPT

## 2019-01-01 PROCEDURE — 700101 HCHG RX REV CODE 250

## 2019-01-01 PROCEDURE — 99153 MOD SED SAME PHYS/QHP EA: CPT

## 2019-01-01 PROCEDURE — 82330 ASSAY OF CALCIUM: CPT

## 2019-01-01 PROCEDURE — 84132 ASSAY OF SERUM POTASSIUM: CPT | Mod: 91

## 2019-01-01 PROCEDURE — B548ZZA ULTRASONOGRAPHY OF SUPERIOR VENA CAVA, GUIDANCE: ICD-10-PCS | Performed by: INTERNAL MEDICINE

## 2019-01-01 PROCEDURE — 96366 THER/PROPH/DIAG IV INF ADDON: CPT

## 2019-01-01 PROCEDURE — 82803 BLOOD GASES ANY COMBINATION: CPT

## 2019-01-01 PROCEDURE — 99238 HOSP IP/OBS DSCHRG MGMT 30/<: CPT | Mod: GC | Performed by: INTERNAL MEDICINE

## 2019-01-01 RX ORDER — SODIUM CHLORIDE 9 MG/ML
1000 INJECTION, SOLUTION INTRAVENOUS ONCE
Status: COMPLETED | OUTPATIENT
Start: 2019-01-01 | End: 2019-01-01

## 2019-01-01 RX ORDER — SUMATRIPTAN 50 MG/1
50 TABLET, FILM COATED ORAL
Status: ON HOLD | COMMUNITY
End: 2019-01-01 | Stop reason: SDUPTHER

## 2019-01-01 RX ORDER — POLYETHYLENE GLYCOL 3350 17 G/17G
1 POWDER, FOR SOLUTION ORAL
Status: DISCONTINUED | OUTPATIENT
Start: 2019-01-01 | End: 2019-01-01 | Stop reason: HOSPADM

## 2019-01-01 RX ORDER — MORPHINE SULFATE 4 MG/ML
2 INJECTION, SOLUTION INTRAMUSCULAR; INTRAVENOUS EVERY 4 HOURS PRN
Status: DISCONTINUED | OUTPATIENT
Start: 2019-01-01 | End: 2019-01-01

## 2019-01-01 RX ORDER — MAGNESIUM SULFATE HEPTAHYDRATE 40 MG/ML
2 INJECTION, SOLUTION INTRAVENOUS
Status: COMPLETED | OUTPATIENT
Start: 2019-01-01 | End: 2019-01-01

## 2019-01-01 RX ORDER — POTASSIUM CHLORIDE 14.9 MG/ML
20 INJECTION INTRAVENOUS ONCE
Status: COMPLETED | OUTPATIENT
Start: 2019-01-01 | End: 2019-01-01

## 2019-01-01 RX ORDER — OLANZAPINE 5 MG/1
5 TABLET, ORALLY DISINTEGRATING ORAL EVERY EVENING
Status: DISCONTINUED | OUTPATIENT
Start: 2019-01-01 | End: 2019-01-01 | Stop reason: HOSPADM

## 2019-01-01 RX ORDER — INSULIN GLARGINE 100 [IU]/ML
20 INJECTION, SOLUTION SUBCUTANEOUS EVERY EVENING
Status: DISCONTINUED | OUTPATIENT
Start: 2019-01-01 | End: 2019-01-01 | Stop reason: HOSPADM

## 2019-01-01 RX ORDER — POTASSIUM CHLORIDE 7.45 MG/ML
10 INJECTION INTRAVENOUS
Status: COMPLETED | OUTPATIENT
Start: 2019-01-01 | End: 2019-01-01

## 2019-01-01 RX ORDER — HALOPERIDOL 5 MG/ML
2 INJECTION INTRAMUSCULAR EVERY 4 HOURS PRN
Status: DISCONTINUED | OUTPATIENT
Start: 2019-01-01 | End: 2019-01-01

## 2019-01-01 RX ORDER — MORPHINE SULFATE 4 MG/ML
4 INJECTION, SOLUTION INTRAMUSCULAR; INTRAVENOUS ONCE
Status: COMPLETED | OUTPATIENT
Start: 2019-01-01 | End: 2019-01-01

## 2019-01-01 RX ORDER — DIPHENHYDRAMINE HYDROCHLORIDE 50 MG/ML
25 INJECTION INTRAMUSCULAR; INTRAVENOUS EVERY 6 HOURS PRN
Status: DISCONTINUED | OUTPATIENT
Start: 2019-01-01 | End: 2019-01-01 | Stop reason: HOSPADM

## 2019-01-01 RX ORDER — MAGNESIUM SULFATE HEPTAHYDRATE 40 MG/ML
4 INJECTION, SOLUTION INTRAVENOUS
Status: COMPLETED | OUTPATIENT
Start: 2019-01-01 | End: 2019-01-01

## 2019-01-01 RX ORDER — SODIUM CHLORIDE, SODIUM LACTATE, POTASSIUM CHLORIDE, AND CALCIUM CHLORIDE .6; .31; .03; .02 G/100ML; G/100ML; G/100ML; G/100ML
2000 INJECTION, SOLUTION INTRAVENOUS ONCE
Status: COMPLETED | OUTPATIENT
Start: 2019-01-01 | End: 2019-01-01

## 2019-01-01 RX ORDER — DEXTROSE AND SODIUM CHLORIDE 10; .45 G/100ML; G/100ML
INJECTION, SOLUTION INTRAVENOUS CONTINUOUS
Status: DISCONTINUED | OUTPATIENT
Start: 2019-01-01 | End: 2019-01-01

## 2019-01-01 RX ORDER — SODIUM CHLORIDE 9 MG/ML
2000 INJECTION, SOLUTION INTRAVENOUS ONCE
Status: DISCONTINUED | OUTPATIENT
Start: 2019-01-01 | End: 2019-01-01

## 2019-01-01 RX ORDER — ONDANSETRON 2 MG/ML
4 INJECTION INTRAMUSCULAR; INTRAVENOUS ONCE
Status: COMPLETED | OUTPATIENT
Start: 2019-01-01 | End: 2019-01-01

## 2019-01-01 RX ORDER — SODIUM CHLORIDE 450 MG/100ML
INJECTION, SOLUTION INTRAVENOUS CONTINUOUS
Status: DISCONTINUED | OUTPATIENT
Start: 2019-01-01 | End: 2019-01-01

## 2019-01-01 RX ORDER — MORPHINE SULFATE 4 MG/ML
1-2 INJECTION, SOLUTION INTRAMUSCULAR; INTRAVENOUS EVERY 4 HOURS PRN
Status: DISCONTINUED | OUTPATIENT
Start: 2019-01-01 | End: 2019-01-01

## 2019-01-01 RX ORDER — SODIUM CHLORIDE 9 MG/ML
500 INJECTION, SOLUTION INTRAVENOUS
Status: DISCONTINUED | OUTPATIENT
Start: 2019-01-01 | End: 2019-01-01 | Stop reason: HOSPADM

## 2019-01-01 RX ORDER — OMEPRAZOLE 20 MG/1
20 CAPSULE, DELAYED RELEASE ORAL DAILY
Status: DISCONTINUED | OUTPATIENT
Start: 2019-01-01 | End: 2019-01-01 | Stop reason: HOSPADM

## 2019-01-01 RX ORDER — POTASSIUM CHLORIDE 7.45 MG/ML
10 INJECTION INTRAVENOUS ONCE
Status: COMPLETED | OUTPATIENT
Start: 2019-01-01 | End: 2019-01-01

## 2019-01-01 RX ORDER — SODIUM CHLORIDE 9 MG/ML
2000 INJECTION, SOLUTION INTRAVENOUS ONCE
Status: COMPLETED | OUTPATIENT
Start: 2019-01-01 | End: 2019-01-01

## 2019-01-01 RX ORDER — BISACODYL 10 MG
10 SUPPOSITORY, RECTAL RECTAL
Status: DISCONTINUED | OUTPATIENT
Start: 2019-01-01 | End: 2019-01-01 | Stop reason: HOSPADM

## 2019-01-01 RX ORDER — OLANZAPINE 5 MG/1
5 TABLET ORAL EVERY EVENING
Status: DISCONTINUED | OUTPATIENT
Start: 2019-01-01 | End: 2019-01-01 | Stop reason: HOSPADM

## 2019-01-01 RX ORDER — MAGNESIUM SULFATE HEPTAHYDRATE 40 MG/ML
4 INJECTION, SOLUTION INTRAVENOUS ONCE
Status: COMPLETED | OUTPATIENT
Start: 2019-01-01 | End: 2019-01-01

## 2019-01-01 RX ORDER — METOCLOPRAMIDE HYDROCHLORIDE 5 MG/5ML
10 SOLUTION ORAL
Status: DISCONTINUED | OUTPATIENT
Start: 2019-01-01 | End: 2019-01-01 | Stop reason: HOSPADM

## 2019-01-01 RX ORDER — OMEPRAZOLE 20 MG/1
20 CAPSULE, DELAYED RELEASE ORAL DAILY
Qty: 30 CAP | Refills: 0 | Status: SHIPPED | OUTPATIENT
Start: 2019-01-01

## 2019-01-01 RX ORDER — POTASSIUM CHLORIDE 14.9 MG/ML
20 INJECTION INTRAVENOUS ONCE
Status: DISCONTINUED | OUTPATIENT
Start: 2019-01-01 | End: 2019-01-01

## 2019-01-01 RX ORDER — MAGNESIUM SULFATE HEPTAHYDRATE 40 MG/ML
2 INJECTION, SOLUTION INTRAVENOUS ONCE
Status: COMPLETED | OUTPATIENT
Start: 2019-01-01 | End: 2019-01-01

## 2019-01-01 RX ORDER — METOCLOPRAMIDE HYDROCHLORIDE 5 MG/ML
10 INJECTION INTRAMUSCULAR; INTRAVENOUS EVERY 6 HOURS PRN
Status: DISCONTINUED | OUTPATIENT
Start: 2019-01-01 | End: 2019-01-01 | Stop reason: HOSPADM

## 2019-01-01 RX ORDER — KETOROLAC TROMETHAMINE 30 MG/ML
30 INJECTION, SOLUTION INTRAMUSCULAR; INTRAVENOUS ONCE
Status: COMPLETED | OUTPATIENT
Start: 2019-01-01 | End: 2019-01-01

## 2019-01-01 RX ORDER — MIDAZOLAM HYDROCHLORIDE 1 MG/ML
INJECTION INTRAMUSCULAR; INTRAVENOUS
Status: COMPLETED
Start: 2019-01-01 | End: 2019-01-01

## 2019-01-01 RX ORDER — SODIUM CHLORIDE 9 MG/ML
INJECTION, SOLUTION INTRAVENOUS
Status: DISCONTINUED
Start: 2019-01-01 | End: 2019-01-01

## 2019-01-01 RX ORDER — SODIUM CHLORIDE, SODIUM LACTATE, POTASSIUM CHLORIDE, CALCIUM CHLORIDE 600; 310; 30; 20 MG/100ML; MG/100ML; MG/100ML; MG/100ML
INJECTION, SOLUTION INTRAVENOUS CONTINUOUS
Status: DISCONTINUED | OUTPATIENT
Start: 2019-01-01 | End: 2019-01-01

## 2019-01-01 RX ORDER — INSULIN GLARGINE 100 [IU]/ML
30 INJECTION, SOLUTION SUBCUTANEOUS 2 TIMES DAILY
Status: DISCONTINUED | OUTPATIENT
Start: 2019-01-01 | End: 2019-01-01 | Stop reason: HOSPADM

## 2019-01-01 RX ORDER — SODIUM CHLORIDE, SODIUM LACTATE, POTASSIUM CHLORIDE, AND CALCIUM CHLORIDE .6; .31; .03; .02 G/100ML; G/100ML; G/100ML; G/100ML
2000 INJECTION, SOLUTION INTRAVENOUS ONCE
Status: DISCONTINUED | OUTPATIENT
Start: 2019-01-01 | End: 2019-01-01

## 2019-01-01 RX ORDER — DEXTROSE AND SODIUM CHLORIDE 5; .45 G/100ML; G/100ML
INJECTION, SOLUTION INTRAVENOUS CONTINUOUS
Status: DISCONTINUED | OUTPATIENT
Start: 2019-01-01 | End: 2019-01-01

## 2019-01-01 RX ORDER — POTASSIUM CHLORIDE 20 MEQ/1
40 TABLET, EXTENDED RELEASE ORAL DAILY
Status: DISCONTINUED | OUTPATIENT
Start: 2019-01-01 | End: 2019-01-01 | Stop reason: HOSPADM

## 2019-01-01 RX ORDER — ONDANSETRON 2 MG/ML
4 INJECTION INTRAMUSCULAR; INTRAVENOUS PRN
Status: DISCONTINUED | OUTPATIENT
Start: 2019-01-01 | End: 2019-01-01 | Stop reason: HOSPADM

## 2019-01-01 RX ORDER — MAGNESIUM SULFATE 1 G/100ML
1 INJECTION INTRAVENOUS ONCE
Status: COMPLETED | OUTPATIENT
Start: 2019-01-01 | End: 2019-01-01

## 2019-01-01 RX ORDER — DEXTROSE AND SODIUM CHLORIDE 10; .45 G/100ML; G/100ML
INJECTION, SOLUTION INTRAVENOUS CONTINUOUS
Status: DISPENSED | OUTPATIENT
Start: 2019-01-01 | End: 2019-01-01

## 2019-01-01 RX ORDER — METOCLOPRAMIDE HYDROCHLORIDE 5 MG/ML
5 INJECTION INTRAMUSCULAR; INTRAVENOUS EVERY 6 HOURS PRN
Status: DISCONTINUED | OUTPATIENT
Start: 2019-01-01 | End: 2019-01-01

## 2019-01-01 RX ORDER — ONDANSETRON 2 MG/ML
INJECTION INTRAMUSCULAR; INTRAVENOUS
Status: COMPLETED
Start: 2019-01-01 | End: 2019-01-01

## 2019-01-01 RX ORDER — PANTOPRAZOLE SODIUM 40 MG/10ML
20 INJECTION, POWDER, LYOPHILIZED, FOR SOLUTION INTRAVENOUS DAILY
Status: DISCONTINUED | OUTPATIENT
Start: 2019-01-01 | End: 2019-01-01

## 2019-01-01 RX ORDER — AMOXICILLIN 250 MG
2 CAPSULE ORAL 2 TIMES DAILY
Status: DISCONTINUED | OUTPATIENT
Start: 2019-01-01 | End: 2019-01-01 | Stop reason: HOSPADM

## 2019-01-01 RX ORDER — KETOROLAC TROMETHAMINE 30 MG/ML
15 INJECTION, SOLUTION INTRAMUSCULAR; INTRAVENOUS EVERY 6 HOURS PRN
Status: DISCONTINUED | OUTPATIENT
Start: 2019-01-01 | End: 2019-01-01 | Stop reason: HOSPADM

## 2019-01-01 RX ORDER — SUMATRIPTAN 50 MG/1
50 TABLET, FILM COATED ORAL
Status: DISCONTINUED | OUTPATIENT
Start: 2019-01-01 | End: 2019-01-01 | Stop reason: HOSPADM

## 2019-01-01 RX ORDER — DEXTROSE, SODIUM CHLORIDE, SODIUM LACTATE, POTASSIUM CHLORIDE, AND CALCIUM CHLORIDE 5; .6; .31; .03; .02 G/100ML; G/100ML; G/100ML; G/100ML; G/100ML
INJECTION, SOLUTION INTRAVENOUS CONTINUOUS
Status: DISCONTINUED | OUTPATIENT
Start: 2019-01-01 | End: 2019-01-01

## 2019-01-01 RX ORDER — METOCLOPRAMIDE HYDROCHLORIDE 5 MG/ML
10 INJECTION INTRAMUSCULAR; INTRAVENOUS EVERY 8 HOURS
Status: DISCONTINUED | OUTPATIENT
Start: 2019-01-01 | End: 2019-01-01

## 2019-01-01 RX ORDER — SODIUM CHLORIDE 9 MG/ML
INJECTION, SOLUTION INTRAVENOUS
Status: ACTIVE
Start: 2019-01-01 | End: 2019-01-01

## 2019-01-01 RX ORDER — LANCETS 30 GAUGE
EACH MISCELLANEOUS
Qty: 100 EACH | Refills: 3 | Status: SHIPPED
Start: 2019-01-01 | End: 2020-01-01

## 2019-01-01 RX ORDER — POTASSIUM CHLORIDE 29.8 MG/ML
40 INJECTION INTRAVENOUS ONCE
Status: COMPLETED | OUTPATIENT
Start: 2019-01-01 | End: 2019-01-01

## 2019-01-01 RX ORDER — SODIUM CHLORIDE 9 MG/ML
INJECTION, SOLUTION INTRAVENOUS
Status: COMPLETED
Start: 2019-01-01 | End: 2019-01-01

## 2019-01-01 RX ORDER — SUMATRIPTAN 50 MG/1
50 TABLET, FILM COATED ORAL
COMMUNITY
End: 2019-01-01

## 2019-01-01 RX ORDER — MAGNESIUM SULFATE HEPTAHYDRATE 40 MG/ML
2 INJECTION, SOLUTION INTRAVENOUS
Status: DISCONTINUED | OUTPATIENT
Start: 2019-01-01 | End: 2019-01-01

## 2019-01-01 RX ORDER — PEN NEEDLE, DIABETIC 30 GX3/16"
100 NEEDLE, DISPOSABLE MISCELLANEOUS 3 TIMES DAILY
Qty: 100 EACH | Refills: 1 | Status: ON HOLD | OUTPATIENT
Start: 2019-01-01 | End: 2019-01-01

## 2019-01-01 RX ORDER — SODIUM CHLORIDE 9 MG/ML
INJECTION, SOLUTION INTRAVENOUS CONTINUOUS
Status: DISCONTINUED | OUTPATIENT
Start: 2019-01-01 | End: 2019-01-01

## 2019-01-01 RX ORDER — INSULIN GLARGINE 100 [IU]/ML
15 INJECTION, SOLUTION SUBCUTANEOUS ONCE
Status: COMPLETED | OUTPATIENT
Start: 2019-01-01 | End: 2019-01-01

## 2019-01-01 RX ORDER — OMEPRAZOLE 20 MG/1
20 CAPSULE, DELAYED RELEASE ORAL DAILY
Status: DISCONTINUED | OUTPATIENT
Start: 2019-01-01 | End: 2019-01-01

## 2019-01-01 RX ORDER — BISACODYL 10 MG
10 SUPPOSITORY, RECTAL RECTAL ONCE
Status: DISPENSED | OUTPATIENT
Start: 2019-01-01 | End: 2019-01-01

## 2019-01-01 RX ORDER — ONDANSETRON 2 MG/ML
4 INJECTION INTRAMUSCULAR; INTRAVENOUS EVERY 4 HOURS PRN
Status: DISCONTINUED | OUTPATIENT
Start: 2019-01-01 | End: 2019-01-01 | Stop reason: HOSPADM

## 2019-01-01 RX ORDER — KETOROLAC TROMETHAMINE 30 MG/ML
30 INJECTION, SOLUTION INTRAMUSCULAR; INTRAVENOUS EVERY 6 HOURS PRN
Status: DISCONTINUED | OUTPATIENT
Start: 2019-01-01 | End: 2019-01-01

## 2019-01-01 RX ORDER — FERROUS SULFATE 325(65) MG
325 TABLET ORAL DAILY
Qty: 30 TAB | Refills: 0 | Status: SHIPPED | OUTPATIENT
Start: 2019-01-01

## 2019-01-01 RX ORDER — OXYCODONE HYDROCHLORIDE 5 MG/1
5 TABLET ORAL
Status: DISCONTINUED | OUTPATIENT
Start: 2019-01-01 | End: 2019-01-01

## 2019-01-01 RX ORDER — METOCLOPRAMIDE HYDROCHLORIDE 5 MG/ML
10 INJECTION INTRAMUSCULAR; INTRAVENOUS EVERY 6 HOURS
Status: DISCONTINUED | OUTPATIENT
Start: 2019-01-01 | End: 2019-01-01

## 2019-01-01 RX ORDER — ASCORBIC ACID 500 MG
500 TABLET ORAL DAILY
Qty: 30 TAB | Refills: 0 | Status: SHIPPED | OUTPATIENT
Start: 2019-01-01

## 2019-01-01 RX ORDER — METOCLOPRAMIDE 10 MG/1
10 TABLET ORAL
Status: DISCONTINUED | OUTPATIENT
Start: 2019-01-01 | End: 2019-01-01 | Stop reason: HOSPADM

## 2019-01-01 RX ORDER — CLONIDINE HYDROCHLORIDE 0.1 MG/1
0.1 TABLET ORAL EVERY 6 HOURS PRN
Status: DISCONTINUED | OUTPATIENT
Start: 2019-01-01 | End: 2019-01-01 | Stop reason: HOSPADM

## 2019-01-01 RX ORDER — COSYNTROPIN 0.25 MG/ML
0.25 INJECTION, POWDER, FOR SOLUTION INTRAMUSCULAR; INTRAVENOUS ONCE
Status: COMPLETED | OUTPATIENT
Start: 2019-01-01 | End: 2019-01-01

## 2019-01-01 RX ORDER — SUMATRIPTAN 50 MG/1
50 TABLET, FILM COATED ORAL
Qty: 10 TAB | Refills: 0 | Status: SHIPPED
Start: 2019-01-01 | End: 2020-01-01

## 2019-01-01 RX ORDER — LIDOCAINE HYDROCHLORIDE AND EPINEPHRINE 10; 10 MG/ML; UG/ML
INJECTION, SOLUTION INFILTRATION; PERINEURAL
Status: COMPLETED
Start: 2019-01-01 | End: 2019-01-01

## 2019-01-01 RX ORDER — ACETAMINOPHEN 325 MG/1
650 TABLET ORAL EVERY 6 HOURS PRN
Status: DISCONTINUED | OUTPATIENT
Start: 2019-01-01 | End: 2019-01-01 | Stop reason: HOSPADM

## 2019-01-01 RX ORDER — CLINDAMYCIN PHOSPHATE 150 MG/ML
INJECTION, SOLUTION INTRAVENOUS
Status: COMPLETED
Start: 2019-01-01 | End: 2019-01-01

## 2019-01-01 RX ORDER — PROMETHAZINE HYDROCHLORIDE 25 MG/1
12.5-25 TABLET ORAL EVERY 4 HOURS PRN
Status: DISCONTINUED | OUTPATIENT
Start: 2019-01-01 | End: 2019-01-01

## 2019-01-01 RX ORDER — OXYCODONE HYDROCHLORIDE 5 MG/1
2.5 TABLET ORAL
Status: DISCONTINUED | OUTPATIENT
Start: 2019-01-01 | End: 2019-01-01

## 2019-01-01 RX ORDER — METOCLOPRAMIDE 10 MG/1
10 TABLET ORAL 4 TIMES DAILY
Status: ON HOLD | COMMUNITY
End: 2019-01-01 | Stop reason: SDUPTHER

## 2019-01-01 RX ORDER — SODIUM CHLORIDE, SODIUM LACTATE, POTASSIUM CHLORIDE, CALCIUM CHLORIDE 600; 310; 30; 20 MG/100ML; MG/100ML; MG/100ML; MG/100ML
1000 INJECTION, SOLUTION INTRAVENOUS ONCE
Status: COMPLETED | OUTPATIENT
Start: 2019-01-01 | End: 2019-01-01

## 2019-01-01 RX ORDER — ONDANSETRON 4 MG/1
4 TABLET, ORALLY DISINTEGRATING ORAL EVERY 4 HOURS PRN
Qty: 10 TAB | Refills: 1 | Status: SHIPPED
Start: 2019-01-01 | End: 2020-01-01

## 2019-01-01 RX ORDER — PANTOPRAZOLE SODIUM 40 MG/10ML
40 INJECTION, POWDER, LYOPHILIZED, FOR SOLUTION INTRAVENOUS DAILY
Status: DISCONTINUED | OUTPATIENT
Start: 2019-01-01 | End: 2019-01-01 | Stop reason: HOSPADM

## 2019-01-01 RX ORDER — OLANZAPINE 5 MG/1
5 TABLET ORAL EVERY EVENING
Status: DISCONTINUED | OUTPATIENT
Start: 2019-01-01 | End: 2019-01-01

## 2019-01-01 RX ORDER — CAPSAICIN 0.025 %
CREAM (GRAM) TOPICAL 3 TIMES DAILY PRN
Status: DISCONTINUED | OUTPATIENT
Start: 2019-01-01 | End: 2019-01-01 | Stop reason: HOSPADM

## 2019-01-01 RX ORDER — FAMOTIDINE 20 MG/1
20 TABLET, FILM COATED ORAL 2 TIMES DAILY
Status: DISCONTINUED | OUTPATIENT
Start: 2019-01-01 | End: 2019-01-01 | Stop reason: HOSPADM

## 2019-01-01 RX ORDER — CLINDAMYCIN PHOSPHATE 900 MG/50ML
900 INJECTION, SOLUTION INTRAVENOUS ONCE
Status: COMPLETED | OUTPATIENT
Start: 2019-01-01 | End: 2019-01-01

## 2019-01-01 RX ORDER — METOCLOPRAMIDE HYDROCHLORIDE 5 MG/ML
10 INJECTION INTRAMUSCULAR; INTRAVENOUS ONCE
Status: DISCONTINUED | OUTPATIENT
Start: 2019-01-01 | End: 2019-01-01

## 2019-01-01 RX ORDER — LIDOCAINE 50 MG/G
1 PATCH TOPICAL EVERY 24 HOURS
Status: DISCONTINUED | OUTPATIENT
Start: 2019-01-01 | End: 2019-01-01 | Stop reason: HOSPADM

## 2019-01-01 RX ORDER — SENNOSIDES A AND B 8.6 MG/1
8.6 TABLET, FILM COATED ORAL 2 TIMES DAILY
Status: DISCONTINUED | OUTPATIENT
Start: 2019-01-01 | End: 2019-01-01 | Stop reason: HOSPADM

## 2019-01-01 RX ORDER — INSULIN GLARGINE 100 [IU]/ML
20 INJECTION, SOLUTION SUBCUTANEOUS 2 TIMES DAILY
Status: DISCONTINUED | OUTPATIENT
Start: 2019-01-01 | End: 2019-01-01

## 2019-01-01 RX ORDER — SODIUM CHLORIDE AND POTASSIUM CHLORIDE 150; 900 MG/100ML; MG/100ML
INJECTION, SOLUTION INTRAVENOUS CONTINUOUS
Status: DISCONTINUED | OUTPATIENT
Start: 2019-01-01 | End: 2019-01-01

## 2019-01-01 RX ORDER — METOCLOPRAMIDE 10 MG/1
10 TABLET ORAL 4 TIMES DAILY
Status: DISCONTINUED | OUTPATIENT
Start: 2019-01-01 | End: 2019-01-01

## 2019-01-01 RX ORDER — DEXTROSE MONOHYDRATE 100 MG/ML
INJECTION, SOLUTION INTRAVENOUS CONTINUOUS
Status: DISCONTINUED | OUTPATIENT
Start: 2019-01-01 | End: 2019-01-01

## 2019-01-01 RX ORDER — PROMETHAZINE HYDROCHLORIDE 25 MG/1
12.5-25 SUPPOSITORY RECTAL EVERY 4 HOURS PRN
Status: DISCONTINUED | OUTPATIENT
Start: 2019-01-01 | End: 2019-01-01

## 2019-01-01 RX ORDER — INSULIN GLARGINE 100 [IU]/ML
10 INJECTION, SOLUTION SUBCUTANEOUS ONCE
Status: COMPLETED | OUTPATIENT
Start: 2019-01-01 | End: 2019-01-01

## 2019-01-01 RX ORDER — INSULIN GLARGINE 100 [IU]/ML
30 INJECTION, SOLUTION SUBCUTANEOUS
Status: DISCONTINUED | OUTPATIENT
Start: 2019-01-01 | End: 2019-01-01 | Stop reason: HOSPADM

## 2019-01-01 RX ORDER — MIDAZOLAM HYDROCHLORIDE 1 MG/ML
.5-2 INJECTION INTRAMUSCULAR; INTRAVENOUS PRN
Status: DISCONTINUED | OUTPATIENT
Start: 2019-01-01 | End: 2019-01-01 | Stop reason: HOSPADM

## 2019-01-01 RX ORDER — ONDANSETRON 4 MG/1
4 TABLET, ORALLY DISINTEGRATING ORAL EVERY 4 HOURS PRN
Status: DISCONTINUED | OUTPATIENT
Start: 2019-01-01 | End: 2019-01-01 | Stop reason: HOSPADM

## 2019-01-01 RX ORDER — METOCLOPRAMIDE HYDROCHLORIDE 5 MG/ML
10 INJECTION INTRAMUSCULAR; INTRAVENOUS EVERY 6 HOURS
Status: DISCONTINUED | OUTPATIENT
Start: 2019-01-01 | End: 2019-01-01 | Stop reason: HOSPADM

## 2019-01-01 RX ORDER — SODIUM CHLORIDE, SODIUM LACTATE, POTASSIUM CHLORIDE, AND CALCIUM CHLORIDE .6; .31; .03; .02 G/100ML; G/100ML; G/100ML; G/100ML
500 INJECTION, SOLUTION INTRAVENOUS ONCE
Status: COMPLETED | OUTPATIENT
Start: 2019-01-01 | End: 2019-01-01

## 2019-01-01 RX ORDER — DEXTROSE MONOHYDRATE, SODIUM CHLORIDE, SODIUM LACTATE, POTASSIUM CHLORIDE, CALCIUM CHLORIDE 5; 600; 310; 179; 20 G/100ML; MG/100ML; MG/100ML; MG/100ML; MG/100ML
INJECTION, SOLUTION INTRAVENOUS CONTINUOUS
Status: DISCONTINUED | OUTPATIENT
Start: 2019-01-01 | End: 2019-01-01

## 2019-01-01 RX ORDER — SODIUM CHLORIDE, SODIUM LACTATE, POTASSIUM CHLORIDE, CALCIUM CHLORIDE 600; 310; 30; 20 MG/100ML; MG/100ML; MG/100ML; MG/100ML
1000 INJECTION, SOLUTION INTRAVENOUS CONTINUOUS
Status: DISCONTINUED | OUTPATIENT
Start: 2019-01-01 | End: 2019-01-01

## 2019-01-01 RX ORDER — METOCLOPRAMIDE 10 MG/1
10 TABLET ORAL 4 TIMES DAILY
Qty: 120 TAB | Refills: 0 | Status: SHIPPED | OUTPATIENT
Start: 2019-01-01

## 2019-01-01 RX ORDER — POTASSIUM CHLORIDE 29.8 MG/ML
40 INJECTION INTRAVENOUS ONCE
Status: DISCONTINUED | OUTPATIENT
Start: 2019-01-01 | End: 2019-01-01

## 2019-01-01 RX ORDER — ONDANSETRON 4 MG/1
4 TABLET, ORALLY DISINTEGRATING ORAL EVERY 4 HOURS PRN
Qty: 10 TAB | Refills: 1 | Status: ON HOLD | OUTPATIENT
Start: 2019-01-01 | End: 2019-01-01 | Stop reason: SDUPTHER

## 2019-01-01 RX ORDER — INSULIN GLARGINE 100 [IU]/ML
15 INJECTION, SOLUTION SUBCUTANEOUS 2 TIMES DAILY
Status: DISCONTINUED | OUTPATIENT
Start: 2019-01-01 | End: 2019-01-01 | Stop reason: HOSPADM

## 2019-01-01 RX ORDER — HYDROMORPHONE HYDROCHLORIDE 1 MG/ML
.5-1 INJECTION, SOLUTION INTRAMUSCULAR; INTRAVENOUS; SUBCUTANEOUS
Status: DISCONTINUED | OUTPATIENT
Start: 2019-01-01 | End: 2019-01-01

## 2019-01-01 RX ORDER — MAGNESIUM SULFATE HEPTAHYDRATE 40 MG/ML
2 INJECTION, SOLUTION INTRAVENOUS ONCE
Status: DISCONTINUED | OUTPATIENT
Start: 2019-01-01 | End: 2019-01-01

## 2019-01-01 RX ORDER — OMEPRAZOLE 20 MG/1
20 CAPSULE, DELAYED RELEASE ORAL DAILY
Qty: 15 CAP | Refills: 0 | Status: ON HOLD | OUTPATIENT
Start: 2019-01-01 | End: 2019-01-01 | Stop reason: SDUPTHER

## 2019-01-01 RX ORDER — MAGNESIUM SULFATE HEPTAHYDRATE 40 MG/ML
4 INJECTION, SOLUTION INTRAVENOUS
Status: DISCONTINUED | OUTPATIENT
Start: 2019-01-01 | End: 2019-01-01

## 2019-01-01 RX ORDER — OXYCODONE HYDROCHLORIDE 5 MG/1
5 TABLET ORAL EVERY 4 HOURS PRN
Status: DISCONTINUED | OUTPATIENT
Start: 2019-01-01 | End: 2019-01-01 | Stop reason: HOSPADM

## 2019-01-01 RX ORDER — MORPHINE SULFATE 4 MG/ML
2 INJECTION, SOLUTION INTRAMUSCULAR; INTRAVENOUS
Status: DISCONTINUED | OUTPATIENT
Start: 2019-01-01 | End: 2019-01-01

## 2019-01-01 RX ADMIN — METOCLOPRAMIDE 10 MG: 5 INJECTION, SOLUTION INTRAMUSCULAR; INTRAVENOUS at 11:10

## 2019-01-01 RX ADMIN — DEXTROSE AND SODIUM CHLORIDE: 10; .45 INJECTION, SOLUTION INTRAVENOUS at 02:42

## 2019-01-01 RX ADMIN — MORPHINE SULFATE 2 MG: 4 INJECTION INTRAVENOUS at 01:10

## 2019-01-01 RX ADMIN — FAMOTIDINE 20 MG: 10 INJECTION INTRAVENOUS at 17:08

## 2019-01-01 RX ADMIN — SENNOSIDES, DOCUSATE SODIUM 2 TABLET: 50; 8.6 TABLET, FILM COATED ORAL at 18:08

## 2019-01-01 RX ADMIN — MORPHINE SULFATE 2 MG: 4 INJECTION INTRAVENOUS at 17:12

## 2019-01-01 RX ADMIN — POTASSIUM CHLORIDE 10 MEQ: 7.46 INJECTION, SOLUTION INTRAVENOUS at 19:33

## 2019-01-01 RX ADMIN — FOLIC ACID 1 MG: 5 INJECTION, SOLUTION INTRAMUSCULAR; INTRAVENOUS; SUBCUTANEOUS at 06:50

## 2019-01-01 RX ADMIN — DEXTROSE AND SODIUM CHLORIDE: 10; .45 INJECTION, SOLUTION INTRAVENOUS at 17:12

## 2019-01-01 RX ADMIN — POTASSIUM CHLORIDE 20 MEQ: 14.9 INJECTION, SOLUTION INTRAVENOUS at 00:43

## 2019-01-01 RX ADMIN — POTASSIUM CHLORIDE 20 MEQ: 14.9 INJECTION, SOLUTION INTRAVENOUS at 04:46

## 2019-01-01 RX ADMIN — MORPHINE SULFATE 2 MG: 4 INJECTION INTRAVENOUS at 21:19

## 2019-01-01 RX ADMIN — POTASSIUM CHLORIDE 20 MEQ: 14.9 INJECTION, SOLUTION INTRAVENOUS at 13:08

## 2019-01-01 RX ADMIN — METOCLOPRAMIDE 10 MG: 5 INJECTION, SOLUTION INTRAMUSCULAR; INTRAVENOUS at 12:03

## 2019-01-01 RX ADMIN — METOCLOPRAMIDE HYDROCHLORIDE 10 MG: 10 TABLET ORAL at 11:27

## 2019-01-01 RX ADMIN — INSULIN GLARGINE 20 UNITS: 100 INJECTION, SOLUTION SUBCUTANEOUS at 16:55

## 2019-01-01 RX ADMIN — INSULIN HUMAN 4 UNITS: 100 INJECTION, SOLUTION PARENTERAL at 20:38

## 2019-01-01 RX ADMIN — POTASSIUM CHLORIDE 20 MEQ: 14.9 INJECTION, SOLUTION INTRAVENOUS at 19:50

## 2019-01-01 RX ADMIN — POTASSIUM CHLORIDE 20 MEQ: 200 INJECTION, SOLUTION INTRAVENOUS at 02:59

## 2019-01-01 RX ADMIN — POTASSIUM CHLORIDE: 2 INJECTION, SOLUTION, CONCENTRATE INTRAVENOUS at 16:01

## 2019-01-01 RX ADMIN — METOCLOPRAMIDE HYDROCHLORIDE 10 MG: 10 TABLET ORAL at 18:03

## 2019-01-01 RX ADMIN — SODIUM CHLORIDE 3.5 UNITS/HR: 9 INJECTION, SOLUTION INTRAVENOUS at 03:11

## 2019-01-01 RX ADMIN — VITAMIN D, TAB 1000IU (100/BT) 2000 UNITS: 25 TAB at 05:01

## 2019-01-01 RX ADMIN — SODIUM CHLORIDE 1000 ML: 9 INJECTION, SOLUTION INTRAVENOUS at 16:53

## 2019-01-01 RX ADMIN — ONDANSETRON 4 MG: 2 INJECTION INTRAMUSCULAR; INTRAVENOUS at 16:09

## 2019-01-01 RX ADMIN — ENOXAPARIN SODIUM 40 MG: 100 INJECTION SUBCUTANEOUS at 05:15

## 2019-01-01 RX ADMIN — DIBASIC SODIUM PHOSPHATE, MONOBASIC POTASSIUM PHOSPHATE AND MONOBASIC SODIUM PHOSPHATE 2 TABLET: 852; 155; 130 TABLET ORAL at 18:03

## 2019-01-01 RX ADMIN — PROCHLORPERAZINE EDISYLATE 10 MG: 5 INJECTION INTRAMUSCULAR; INTRAVENOUS at 02:08

## 2019-01-01 RX ADMIN — METOCLOPRAMIDE 10 MG: 5 INJECTION, SOLUTION INTRAMUSCULAR; INTRAVENOUS at 00:05

## 2019-01-01 RX ADMIN — MORPHINE SULFATE 4 MG: 4 INJECTION INTRAVENOUS at 10:45

## 2019-01-01 RX ADMIN — SODIUM PHOSPHATE, MONOBASIC, MONOHYDRATE AND SODIUM PHOSPHATE, DIBASIC, ANHYDROUS 30 MMOL: 276; 142 INJECTION, SOLUTION INTRAVENOUS at 21:18

## 2019-01-01 RX ADMIN — DEXTROSE AND SODIUM CHLORIDE: 10; .45 INJECTION, SOLUTION INTRAVENOUS at 02:48

## 2019-01-01 RX ADMIN — INSULIN GLARGINE 30 UNITS: 100 INJECTION, SOLUTION SUBCUTANEOUS at 11:16

## 2019-01-01 RX ADMIN — SENNOSIDES, DOCUSATE SODIUM 2 TABLET: 50; 8.6 TABLET, FILM COATED ORAL at 05:34

## 2019-01-01 RX ADMIN — INSULIN HUMAN 4 UNITS: 100 INJECTION, SOLUTION PARENTERAL at 18:19

## 2019-01-01 RX ADMIN — POTASSIUM CHLORIDE 40 MEQ: 29.8 INJECTION, SOLUTION INTRAVENOUS at 02:13

## 2019-01-01 RX ADMIN — PROCHLORPERAZINE EDISYLATE 10 MG: 5 INJECTION INTRAMUSCULAR; INTRAVENOUS at 00:49

## 2019-01-01 RX ADMIN — SENNOSIDES, DOCUSATE SODIUM 2 TABLET: 50; 8.6 TABLET, FILM COATED ORAL at 18:02

## 2019-01-01 RX ADMIN — FENTANYL CITRATE 25 MCG: 0.05 INJECTION, SOLUTION INTRAMUSCULAR; INTRAVENOUS at 11:49

## 2019-01-01 RX ADMIN — SODIUM CHLORIDE, POTASSIUM CHLORIDE, SODIUM LACTATE AND CALCIUM CHLORIDE 1000 ML: 600; 310; 30; 20 INJECTION, SOLUTION INTRAVENOUS at 20:28

## 2019-01-01 RX ADMIN — INSULIN GLARGINE 15 UNITS: 100 INJECTION, SOLUTION SUBCUTANEOUS at 19:22

## 2019-01-01 RX ADMIN — POTASSIUM CHLORIDE 40 MEQ: 29.8 INJECTION, SOLUTION INTRAVENOUS at 00:25

## 2019-01-01 RX ADMIN — METOCLOPRAMIDE 10 MG: 5 INJECTION, SOLUTION INTRAMUSCULAR; INTRAVENOUS at 17:15

## 2019-01-01 RX ADMIN — INSULIN GLARGINE 15 UNITS: 100 INJECTION, SOLUTION SUBCUTANEOUS at 05:27

## 2019-01-01 RX ADMIN — ONDANSETRON 4 MG: 2 INJECTION INTRAMUSCULAR; INTRAVENOUS at 17:27

## 2019-01-01 RX ADMIN — OMEPRAZOLE 20 MG: 20 CAPSULE, DELAYED RELEASE ORAL at 05:59

## 2019-01-01 RX ADMIN — METOCLOPRAMIDE 10 MG: 5 INJECTION, SOLUTION INTRAMUSCULAR; INTRAVENOUS at 04:53

## 2019-01-01 RX ADMIN — OLANZAPINE 5 MG: 5 TABLET, ORALLY DISINTEGRATING ORAL at 17:08

## 2019-01-01 RX ADMIN — SENNOSIDES, DOCUSATE SODIUM 2 TABLET: 50; 8.6 TABLET, FILM COATED ORAL at 05:23

## 2019-01-01 RX ADMIN — SODIUM CHLORIDE 2 UNITS/HR: 9 INJECTION, SOLUTION INTRAVENOUS at 23:57

## 2019-01-01 RX ADMIN — METOCLOPRAMIDE 10 MG: 5 INJECTION, SOLUTION INTRAMUSCULAR; INTRAVENOUS at 04:55

## 2019-01-01 RX ADMIN — POTASSIUM CHLORIDE 10 MEQ: 10 INJECTION, SOLUTION INTRAVENOUS at 20:30

## 2019-01-01 RX ADMIN — ENOXAPARIN SODIUM 40 MG: 100 INJECTION SUBCUTANEOUS at 17:37

## 2019-01-01 RX ADMIN — SODIUM CHLORIDE, POTASSIUM CHLORIDE, SODIUM LACTATE AND CALCIUM CHLORIDE 2000 ML: 600; 310; 30; 20 INJECTION, SOLUTION INTRAVENOUS at 09:54

## 2019-01-01 RX ADMIN — METOCLOPRAMIDE 10 MG: 5 INJECTION, SOLUTION INTRAMUSCULAR; INTRAVENOUS at 05:16

## 2019-01-01 RX ADMIN — DEXTROSE AND SODIUM CHLORIDE: 10; .45 INJECTION, SOLUTION INTRAVENOUS at 19:36

## 2019-01-01 RX ADMIN — METOCLOPRAMIDE 10 MG: 5 INJECTION, SOLUTION INTRAMUSCULAR; INTRAVENOUS at 10:23

## 2019-01-01 RX ADMIN — POTASSIUM PHOSPHATE, MONOBASIC AND POTASSIUM PHOSPHATE, DIBASIC 30 MMOL: 224; 236 INJECTION, SOLUTION, CONCENTRATE INTRAVENOUS at 21:11

## 2019-01-01 RX ADMIN — METOCLOPRAMIDE 10 MG: 5 INJECTION, SOLUTION INTRAMUSCULAR; INTRAVENOUS at 05:15

## 2019-01-01 RX ADMIN — ONDANSETRON 4 MG: 2 INJECTION INTRAMUSCULAR; INTRAVENOUS at 10:45

## 2019-01-01 RX ADMIN — POTASSIUM CHLORIDE 20 MEQ: 14.9 INJECTION, SOLUTION INTRAVENOUS at 18:25

## 2019-01-01 RX ADMIN — SODIUM CHLORIDE 8 UNITS/HR: 9 INJECTION, SOLUTION INTRAVENOUS at 14:00

## 2019-01-01 RX ADMIN — POTASSIUM CHLORIDE 10 MEQ: 10 INJECTION, SOLUTION INTRAVENOUS at 13:06

## 2019-01-01 RX ADMIN — ERYTHROMYCIN LACTOBIONATE 250 MG: 500 INJECTION, POWDER, LYOPHILIZED, FOR SOLUTION INTRAVENOUS at 22:01

## 2019-01-01 RX ADMIN — PROCHLORPERAZINE EDISYLATE 10 MG: 5 INJECTION INTRAMUSCULAR; INTRAVENOUS at 17:08

## 2019-01-01 RX ADMIN — POTASSIUM CHLORIDE 20 MEQ: 14.9 INJECTION, SOLUTION INTRAVENOUS at 10:45

## 2019-01-01 RX ADMIN — MORPHINE SULFATE 2 MG: 4 INJECTION INTRAVENOUS at 10:12

## 2019-01-01 RX ADMIN — SODIUM CHLORIDE 1000 ML: 9 INJECTION, SOLUTION INTRAVENOUS at 18:55

## 2019-01-01 RX ADMIN — ERYTHROMYCIN LACTOBIONATE 250 MG: 500 INJECTION, POWDER, LYOPHILIZED, FOR SOLUTION INTRAVENOUS at 13:06

## 2019-01-01 RX ADMIN — OXYCODONE HYDROCHLORIDE 5 MG: 5 TABLET ORAL at 16:48

## 2019-01-01 RX ADMIN — DEXTROSE MONOHYDRATE, SODIUM CHLORIDE, SODIUM LACTATE, POTASSIUM CHLORIDE, CALCIUM CHLORIDE: 5; 600; 310; 179; 20 INJECTION, SOLUTION INTRAVENOUS at 16:56

## 2019-01-01 RX ADMIN — METOCLOPRAMIDE 10 MG: 5 INJECTION, SOLUTION INTRAMUSCULAR; INTRAVENOUS at 12:08

## 2019-01-01 RX ADMIN — SODIUM CHLORIDE 0.5 UNITS/HR: 9 INJECTION, SOLUTION INTRAVENOUS at 14:54

## 2019-01-01 RX ADMIN — INSULIN GLARGINE 30 UNITS: 100 INJECTION, SOLUTION SUBCUTANEOUS at 18:05

## 2019-01-01 RX ADMIN — ENOXAPARIN SODIUM 40 MG: 100 INJECTION SUBCUTANEOUS at 04:21

## 2019-01-01 RX ADMIN — METOCLOPRAMIDE 5 MG: 5 INJECTION, SOLUTION INTRAMUSCULAR; INTRAVENOUS at 04:02

## 2019-01-01 RX ADMIN — DEXTROSE MONOHYDRATE: 100 INJECTION, SOLUTION INTRAVENOUS at 22:32

## 2019-01-01 RX ADMIN — MAGNESIUM SULFATE IN WATER 2 G: 40 INJECTION, SOLUTION INTRAVENOUS at 12:11

## 2019-01-01 RX ADMIN — MORPHINE SULFATE 2 MG: 4 INJECTION INTRAVENOUS at 22:07

## 2019-01-01 RX ADMIN — POTASSIUM CHLORIDE 10 MEQ: 10 INJECTION, SOLUTION INTRAVENOUS at 13:35

## 2019-01-01 RX ADMIN — SODIUM CHLORIDE 1 UNITS/HR: 9 INJECTION, SOLUTION INTRAVENOUS at 10:57

## 2019-01-01 RX ADMIN — DIBASIC SODIUM PHOSPHATE, MONOBASIC POTASSIUM PHOSPHATE AND MONOBASIC SODIUM PHOSPHATE 2 TABLET: 852; 155; 130 TABLET ORAL at 04:21

## 2019-01-01 RX ADMIN — METOCLOPRAMIDE 10 MG: 5 INJECTION, SOLUTION INTRAMUSCULAR; INTRAVENOUS at 18:01

## 2019-01-01 RX ADMIN — PROCHLORPERAZINE EDISYLATE 5 MG: 5 INJECTION INTRAMUSCULAR; INTRAVENOUS at 16:36

## 2019-01-01 RX ADMIN — DEXTROSE AND SODIUM CHLORIDE: 10; .45 INJECTION, SOLUTION INTRAVENOUS at 00:59

## 2019-01-01 RX ADMIN — ENOXAPARIN SODIUM 40 MG: 100 INJECTION SUBCUTANEOUS at 18:00

## 2019-01-01 RX ADMIN — ENOXAPARIN SODIUM 40 MG: 100 INJECTION SUBCUTANEOUS at 05:03

## 2019-01-01 RX ADMIN — POTASSIUM CHLORIDE 40 MEQ: 29.8 INJECTION, SOLUTION INTRAVENOUS at 02:24

## 2019-01-01 RX ADMIN — SENNOSIDES, DOCUSATE SODIUM 2 TABLET: 50; 8.6 TABLET, FILM COATED ORAL at 05:15

## 2019-01-01 RX ADMIN — DEXTROSE AND SODIUM CHLORIDE: 10; .45 INJECTION, SOLUTION INTRAVENOUS at 17:57

## 2019-01-01 RX ADMIN — METOCLOPRAMIDE 10 MG: 5 INJECTION, SOLUTION INTRAMUSCULAR; INTRAVENOUS at 17:50

## 2019-01-01 RX ADMIN — FAMOTIDINE 20 MG: 10 INJECTION INTRAVENOUS at 04:56

## 2019-01-01 RX ADMIN — POTASSIUM CHLORIDE 20 MEQ: 200 INJECTION, SOLUTION INTRAVENOUS at 11:16

## 2019-01-01 RX ADMIN — HYDROMORPHONE HYDROCHLORIDE 0.5 MG: 1 INJECTION, SOLUTION INTRAMUSCULAR; INTRAVENOUS; SUBCUTANEOUS at 08:08

## 2019-01-01 RX ADMIN — METOCLOPRAMIDE 10 MG: 5 INJECTION, SOLUTION INTRAMUSCULAR; INTRAVENOUS at 11:16

## 2019-01-01 RX ADMIN — ENOXAPARIN SODIUM 40 MG: 100 INJECTION SUBCUTANEOUS at 05:00

## 2019-01-01 RX ADMIN — METOCLOPRAMIDE 10 MG: 5 INJECTION, SOLUTION INTRAMUSCULAR; INTRAVENOUS at 23:16

## 2019-01-01 RX ADMIN — MORPHINE SULFATE 2 MG: 4 INJECTION INTRAVENOUS at 16:11

## 2019-01-01 RX ADMIN — ONDANSETRON 4 MG: 2 INJECTION INTRAMUSCULAR; INTRAVENOUS at 15:39

## 2019-01-01 RX ADMIN — MAGNESIUM SULFATE 2 G: 2 INJECTION INTRAVENOUS at 02:43

## 2019-01-01 RX ADMIN — SODIUM PHOSPHATE, MONOBASIC, MONOHYDRATE AND SODIUM PHOSPHATE, DIBASIC, ANHYDROUS 30 MMOL: 276; 142 INJECTION, SOLUTION INTRAVENOUS at 17:24

## 2019-01-01 RX ADMIN — SENNOSIDES, DOCUSATE SODIUM 2 TABLET: 50; 8.6 TABLET, FILM COATED ORAL at 17:35

## 2019-01-01 RX ADMIN — ONDANSETRON 4 MG: 2 INJECTION INTRAMUSCULAR; INTRAVENOUS at 07:43

## 2019-01-01 RX ADMIN — LIDOCAINE 1 PATCH: 50 PATCH CUTANEOUS at 12:45

## 2019-01-01 RX ADMIN — SODIUM CHLORIDE 7 UNITS/HR: 9 INJECTION, SOLUTION INTRAVENOUS at 15:40

## 2019-01-01 RX ADMIN — METOCLOPRAMIDE 10 MG: 5 INJECTION, SOLUTION INTRAMUSCULAR; INTRAVENOUS at 00:06

## 2019-01-01 RX ADMIN — SODIUM CHLORIDE 2 UNITS/HR: 9 INJECTION, SOLUTION INTRAVENOUS at 10:14

## 2019-01-01 RX ADMIN — INSULIN GLARGINE 30 UNITS: 100 INJECTION, SOLUTION SUBCUTANEOUS at 17:24

## 2019-01-01 RX ADMIN — POTASSIUM CHLORIDE 20 MEQ: 14.9 INJECTION, SOLUTION INTRAVENOUS at 22:15

## 2019-01-01 RX ADMIN — ONDANSETRON 4 MG: 2 INJECTION INTRAMUSCULAR; INTRAVENOUS at 15:56

## 2019-01-01 RX ADMIN — PROCHLORPERAZINE EDISYLATE 10 MG: 5 INJECTION INTRAMUSCULAR; INTRAVENOUS at 22:52

## 2019-01-01 RX ADMIN — FAMOTIDINE 20 MG: 10 INJECTION INTRAVENOUS at 05:35

## 2019-01-01 RX ADMIN — METOCLOPRAMIDE 10 MG: 5 INJECTION, SOLUTION INTRAMUSCULAR; INTRAVENOUS at 11:20

## 2019-01-01 RX ADMIN — MORPHINE SULFATE 2 MG: 4 INJECTION INTRAVENOUS at 23:22

## 2019-01-01 RX ADMIN — INSULIN GLARGINE 20 UNITS: 100 INJECTION, SOLUTION SUBCUTANEOUS at 18:08

## 2019-01-01 RX ADMIN — POTASSIUM PHOSPHATE, MONOBASIC AND POTASSIUM PHOSPHATE, DIBASIC 30 MMOL: 224; 236 INJECTION, SOLUTION, CONCENTRATE INTRAVENOUS at 13:34

## 2019-01-01 RX ADMIN — MORPHINE SULFATE 2 MG: 4 INJECTION INTRAVENOUS at 03:01

## 2019-01-01 RX ADMIN — METOCLOPRAMIDE 10 MG: 5 INJECTION, SOLUTION INTRAMUSCULAR; INTRAVENOUS at 11:08

## 2019-01-01 RX ADMIN — SODIUM CHLORIDE 1000 ML: 9 INJECTION, SOLUTION INTRAVENOUS at 18:50

## 2019-01-01 RX ADMIN — HEPARIN 500 UNITS: 100 SYRINGE at 17:33

## 2019-01-01 RX ADMIN — MORPHINE SULFATE 2 MG: 4 INJECTION INTRAVENOUS at 00:18

## 2019-01-01 RX ADMIN — SODIUM CHLORIDE 1000 ML: 9 INJECTION, SOLUTION INTRAVENOUS at 10:30

## 2019-01-01 RX ADMIN — ENOXAPARIN SODIUM 40 MG: 100 INJECTION SUBCUTANEOUS at 06:00

## 2019-01-01 RX ADMIN — INSULIN HUMAN 14 UNITS: 100 INJECTION, SOLUTION PARENTERAL at 10:58

## 2019-01-01 RX ADMIN — CLINDAMYCIN PHOSPHATE 900 MG: 150 INJECTION, SOLUTION INTRAMUSCULAR; INTRAVENOUS at 08:41

## 2019-01-01 RX ADMIN — ENOXAPARIN SODIUM 40 MG: 100 INJECTION SUBCUTANEOUS at 17:35

## 2019-01-01 RX ADMIN — METOCLOPRAMIDE 10 MG: 5 INJECTION, SOLUTION INTRAMUSCULAR; INTRAVENOUS at 05:42

## 2019-01-01 RX ADMIN — ONDANSETRON 4 MG: 2 INJECTION INTRAMUSCULAR; INTRAVENOUS at 11:49

## 2019-01-01 RX ADMIN — ONDANSETRON 4 MG: 2 INJECTION INTRAMUSCULAR; INTRAVENOUS at 04:05

## 2019-01-01 RX ADMIN — ONDANSETRON 4 MG: 2 INJECTION INTRAMUSCULAR; INTRAVENOUS at 18:24

## 2019-01-01 RX ADMIN — MORPHINE SULFATE 2 MG: 4 INJECTION INTRAVENOUS at 23:33

## 2019-01-01 RX ADMIN — MORPHINE SULFATE 2 MG: 4 INJECTION INTRAVENOUS at 17:50

## 2019-01-01 RX ADMIN — DEXTROSE AND SODIUM CHLORIDE: 5; 450 INJECTION, SOLUTION INTRAVENOUS at 16:07

## 2019-01-01 RX ADMIN — FENTANYL CITRATE 25 MCG: 0.05 INJECTION, SOLUTION INTRAMUSCULAR; INTRAVENOUS at 05:53

## 2019-01-01 RX ADMIN — POTASSIUM PHOSPHATE, MONOBASIC AND POTASSIUM PHOSPHATE, DIBASIC 15 MMOL: 224; 236 INJECTION, SOLUTION, CONCENTRATE INTRAVENOUS at 22:33

## 2019-01-01 RX ADMIN — POTASSIUM CHLORIDE 10 MEQ: 7.46 INJECTION, SOLUTION INTRAVENOUS at 00:01

## 2019-01-01 RX ADMIN — SENNOSIDES, DOCUSATE SODIUM 2 TABLET: 50; 8.6 TABLET, FILM COATED ORAL at 06:00

## 2019-01-01 RX ADMIN — DEXTROSE AND SODIUM CHLORIDE: 10; .45 INJECTION, SOLUTION INTRAVENOUS at 21:14

## 2019-01-01 RX ADMIN — ONDANSETRON 4 MG: 2 INJECTION INTRAMUSCULAR; INTRAVENOUS at 08:12

## 2019-01-01 RX ADMIN — SODIUM CHLORIDE, POTASSIUM CHLORIDE, SODIUM LACTATE AND CALCIUM CHLORIDE: 600; 310; 30; 20 INJECTION, SOLUTION INTRAVENOUS at 18:37

## 2019-01-01 RX ADMIN — VITAMIN D, TAB 1000IU (100/BT) 2000 UNITS: 25 TAB at 04:56

## 2019-01-01 RX ADMIN — MORPHINE SULFATE 2 MG: 4 INJECTION INTRAVENOUS at 04:15

## 2019-01-01 RX ADMIN — METOCLOPRAMIDE 10 MG: 5 INJECTION, SOLUTION INTRAMUSCULAR; INTRAVENOUS at 17:07

## 2019-01-01 RX ADMIN — MORPHINE SULFATE 2 MG: 4 INJECTION INTRAVENOUS at 17:30

## 2019-01-01 RX ADMIN — METOCLOPRAMIDE 10 MG: 5 INJECTION, SOLUTION INTRAMUSCULAR; INTRAVENOUS at 17:36

## 2019-01-01 RX ADMIN — INSULIN GLARGINE 15 UNITS: 100 INJECTION, SOLUTION SUBCUTANEOUS at 13:55

## 2019-01-01 RX ADMIN — INSULIN GLARGINE 30 UNITS: 100 INJECTION, SOLUTION SUBCUTANEOUS at 18:21

## 2019-01-01 RX ADMIN — DEXTROSE AND SODIUM CHLORIDE: 10; .45 INJECTION, SOLUTION INTRAVENOUS at 08:39

## 2019-01-01 RX ADMIN — ACETAMINOPHEN 650 MG: 325 TABLET, FILM COATED ORAL at 15:56

## 2019-01-01 RX ADMIN — PANTOPRAZOLE SODIUM 20 MG: 40 INJECTION, POWDER, LYOPHILIZED, FOR SOLUTION INTRAVENOUS at 05:00

## 2019-01-01 RX ADMIN — METOCLOPRAMIDE 10 MG: 5 INJECTION, SOLUTION INTRAMUSCULAR; INTRAVENOUS at 23:15

## 2019-01-01 RX ADMIN — OMEPRAZOLE 20 MG: 20 CAPSULE, DELAYED RELEASE ORAL at 04:47

## 2019-01-01 RX ADMIN — SODIUM CHLORIDE 4 UNITS/HR: 9 INJECTION, SOLUTION INTRAVENOUS at 12:15

## 2019-01-01 RX ADMIN — OMEPRAZOLE 20 MG: 20 CAPSULE, DELAYED RELEASE ORAL at 05:43

## 2019-01-01 RX ADMIN — SODIUM CHLORIDE 3 UNITS/HR: 9 INJECTION, SOLUTION INTRAVENOUS at 07:03

## 2019-01-01 RX ADMIN — ONDANSETRON 4 MG: 2 INJECTION INTRAMUSCULAR; INTRAVENOUS at 21:18

## 2019-01-01 RX ADMIN — POTASSIUM CHLORIDE 20 MEQ: 14.9 INJECTION, SOLUTION INTRAVENOUS at 00:00

## 2019-01-01 RX ADMIN — DEXTROSE AND SODIUM CHLORIDE: 10; .45 INJECTION, SOLUTION INTRAVENOUS at 06:06

## 2019-01-01 RX ADMIN — INSULIN GLARGINE 20 UNITS: 100 INJECTION, SOLUTION SUBCUTANEOUS at 17:14

## 2019-01-01 RX ADMIN — SODIUM CHLORIDE 1000 ML: 9 INJECTION, SOLUTION INTRAVENOUS at 18:04

## 2019-01-01 RX ADMIN — POTASSIUM CHLORIDE 10 MEQ: 7.46 INJECTION, SOLUTION INTRAVENOUS at 03:00

## 2019-01-01 RX ADMIN — COSYNTROPIN 250 MCG: 0.25 INJECTION, POWDER, LYOPHILIZED, FOR SOLUTION INTRAVENOUS at 05:04

## 2019-01-01 RX ADMIN — SODIUM CHLORIDE 6 UNITS/HR: 9 INJECTION, SOLUTION INTRAVENOUS at 21:55

## 2019-01-01 RX ADMIN — KETOROLAC TROMETHAMINE 15 MG: 30 INJECTION, SOLUTION INTRAMUSCULAR at 17:11

## 2019-01-01 RX ADMIN — OMEPRAZOLE 20 MG: 20 CAPSULE, DELAYED RELEASE ORAL at 04:21

## 2019-01-01 RX ADMIN — INSULIN GLARGINE 30 UNITS: 100 INJECTION, SOLUTION SUBCUTANEOUS at 09:01

## 2019-01-01 RX ADMIN — MORPHINE SULFATE 2 MG: 4 INJECTION INTRAVENOUS at 05:14

## 2019-01-01 RX ADMIN — POTASSIUM CHLORIDE 10 MEQ: 7.46 INJECTION, SOLUTION INTRAVENOUS at 03:59

## 2019-01-01 RX ADMIN — METOCLOPRAMIDE 10 MG: 5 INJECTION, SOLUTION INTRAMUSCULAR; INTRAVENOUS at 23:23

## 2019-01-01 RX ADMIN — FAMOTIDINE 20 MG: 10 INJECTION INTRAVENOUS at 17:07

## 2019-01-01 RX ADMIN — MORPHINE SULFATE 2 MG: 4 INJECTION INTRAVENOUS at 09:14

## 2019-01-01 RX ADMIN — PROCHLORPERAZINE EDISYLATE 10 MG: 5 INJECTION INTRAMUSCULAR; INTRAVENOUS at 08:37

## 2019-01-01 RX ADMIN — MORPHINE SULFATE 2 MG: 4 INJECTION INTRAVENOUS at 11:19

## 2019-01-01 RX ADMIN — SENNOSIDES, DOCUSATE SODIUM 2 TABLET: 50; 8.6 TABLET, FILM COATED ORAL at 17:30

## 2019-01-01 RX ADMIN — METOCLOPRAMIDE 10 MG: 5 INJECTION, SOLUTION INTRAMUSCULAR; INTRAVENOUS at 17:08

## 2019-01-01 RX ADMIN — POTASSIUM CHLORIDE 10 MEQ: 7.46 INJECTION, SOLUTION INTRAVENOUS at 03:13

## 2019-01-01 RX ADMIN — ONDANSETRON 4 MG: 2 INJECTION INTRAMUSCULAR; INTRAVENOUS at 07:53

## 2019-01-01 RX ADMIN — SODIUM CHLORIDE, POTASSIUM CHLORIDE, SODIUM LACTATE AND CALCIUM CHLORIDE: 600; 310; 30; 20 INJECTION, SOLUTION INTRAVENOUS at 09:07

## 2019-01-01 RX ADMIN — SENNOSIDES, DOCUSATE SODIUM 2 TABLET: 50; 8.6 TABLET, FILM COATED ORAL at 17:15

## 2019-01-01 RX ADMIN — METOCLOPRAMIDE HYDROCHLORIDE 10 MG: 5 SOLUTION ORAL at 16:52

## 2019-01-01 RX ADMIN — SODIUM CHLORIDE, POTASSIUM CHLORIDE, SODIUM LACTATE AND CALCIUM CHLORIDE: 600; 310; 30; 20 INJECTION, SOLUTION INTRAVENOUS at 10:35

## 2019-01-01 RX ADMIN — DEXTROSE AND SODIUM CHLORIDE: 10; .45 INJECTION, SOLUTION INTRAVENOUS at 02:11

## 2019-01-01 RX ADMIN — ENOXAPARIN SODIUM 40 MG: 100 INJECTION SUBCUTANEOUS at 05:59

## 2019-01-01 RX ADMIN — SODIUM CHLORIDE, POTASSIUM CHLORIDE, SODIUM LACTATE AND CALCIUM CHLORIDE: 600; 310; 30; 20 INJECTION, SOLUTION INTRAVENOUS at 13:23

## 2019-01-01 RX ADMIN — ERYTHROMYCIN LACTOBIONATE 250 MG: 500 INJECTION, POWDER, LYOPHILIZED, FOR SOLUTION INTRAVENOUS at 04:58

## 2019-01-01 RX ADMIN — SODIUM BICARBONATE 100 MEQ: 84 INJECTION, SOLUTION INTRAVENOUS at 13:46

## 2019-01-01 RX ADMIN — MAGNESIUM SULFATE 1 G: 1 INJECTION INTRAVENOUS at 11:23

## 2019-01-01 RX ADMIN — METOCLOPRAMIDE 10 MG: 5 INJECTION, SOLUTION INTRAMUSCULAR; INTRAVENOUS at 22:05

## 2019-01-01 RX ADMIN — PROCHLORPERAZINE EDISYLATE 10 MG: 5 INJECTION INTRAMUSCULAR; INTRAVENOUS at 20:13

## 2019-01-01 RX ADMIN — THIAMINE HYDROCHLORIDE 100 MG: 100 INJECTION, SOLUTION INTRAMUSCULAR; INTRAVENOUS at 06:21

## 2019-01-01 RX ADMIN — METOCLOPRAMIDE 10 MG: 5 INJECTION, SOLUTION INTRAMUSCULAR; INTRAVENOUS at 04:05

## 2019-01-01 RX ADMIN — SODIUM PHOSPHATE, MONOBASIC, MONOHYDRATE AND SODIUM PHOSPHATE, DIBASIC, ANHYDROUS 30 MMOL: 276; 142 INJECTION, SOLUTION INTRAVENOUS at 10:28

## 2019-01-01 RX ADMIN — PANTOPRAZOLE SODIUM 40 MG: 40 INJECTION, POWDER, LYOPHILIZED, FOR SOLUTION INTRAVENOUS at 06:09

## 2019-01-01 RX ADMIN — THERA TABS 1 TABLET: TAB at 05:42

## 2019-01-01 RX ADMIN — ACETAMINOPHEN 650 MG: 325 TABLET, FILM COATED ORAL at 17:07

## 2019-01-01 RX ADMIN — POTASSIUM CHLORIDE 20 MEQ: 14.9 INJECTION, SOLUTION INTRAVENOUS at 04:45

## 2019-01-01 RX ADMIN — POTASSIUM CHLORIDE: 2 INJECTION, SOLUTION, CONCENTRATE INTRAVENOUS at 05:06

## 2019-01-01 RX ADMIN — INSULIN HUMAN 8 UNITS: 100 INJECTION, SOLUTION PARENTERAL at 13:34

## 2019-01-01 RX ADMIN — MAGNESIUM SULFATE IN WATER 2 G: 40 INJECTION, SOLUTION INTRAVENOUS at 03:09

## 2019-01-01 RX ADMIN — INSULIN HUMAN 3 UNITS: 100 INJECTION, SOLUTION PARENTERAL at 17:35

## 2019-01-01 RX ADMIN — OXYCODONE HYDROCHLORIDE 5 MG: 5 TABLET ORAL at 23:04

## 2019-01-01 RX ADMIN — ERYTHROMYCIN LACTOBIONATE 250 MG: 500 INJECTION, POWDER, LYOPHILIZED, FOR SOLUTION INTRAVENOUS at 05:41

## 2019-01-01 RX ADMIN — SENNOSIDES, DOCUSATE SODIUM 2 TABLET: 50; 8.6 TABLET, FILM COATED ORAL at 17:08

## 2019-01-01 RX ADMIN — KETOROLAC TROMETHAMINE 30 MG: 30 INJECTION, SOLUTION INTRAMUSCULAR at 13:22

## 2019-01-01 RX ADMIN — INSULIN HUMAN 7 UNITS: 100 INJECTION, SOLUTION PARENTERAL at 12:17

## 2019-01-01 RX ADMIN — POTASSIUM CHLORIDE 10 MEQ: 7.46 INJECTION, SOLUTION INTRAVENOUS at 23:04

## 2019-01-01 RX ADMIN — ONDANSETRON 4 MG: 2 INJECTION INTRAMUSCULAR; INTRAVENOUS at 21:07

## 2019-01-01 RX ADMIN — MORPHINE SULFATE 2 MG: 4 INJECTION INTRAVENOUS at 10:03

## 2019-01-01 RX ADMIN — ONDANSETRON 4 MG: 2 INJECTION INTRAMUSCULAR; INTRAVENOUS at 10:15

## 2019-01-01 RX ADMIN — MORPHINE SULFATE 2 MG: 4 INJECTION INTRAVENOUS at 02:07

## 2019-01-01 RX ADMIN — POTASSIUM CHLORIDE 10 MEQ: 7.46 INJECTION, SOLUTION INTRAVENOUS at 23:54

## 2019-01-01 RX ADMIN — HEPARIN 500 UNITS: 100 SYRINGE at 09:35

## 2019-01-01 RX ADMIN — POTASSIUM PHOSPHATE, MONOBASIC AND POTASSIUM PHOSPHATE, DIBASIC 30 MMOL: 224; 236 INJECTION, SOLUTION, CONCENTRATE INTRAVENOUS at 03:05

## 2019-01-01 RX ADMIN — METOCLOPRAMIDE 10 MG: 5 INJECTION, SOLUTION INTRAMUSCULAR; INTRAVENOUS at 00:03

## 2019-01-01 RX ADMIN — INSULIN GLARGINE 30 UNITS: 100 INJECTION, SOLUTION SUBCUTANEOUS at 17:41

## 2019-01-01 RX ADMIN — SENNOSIDES, DOCUSATE SODIUM 2 TABLET: 50; 8.6 TABLET, FILM COATED ORAL at 05:01

## 2019-01-01 RX ADMIN — PROCHLORPERAZINE EDISYLATE 10 MG: 5 INJECTION INTRAMUSCULAR; INTRAVENOUS at 16:14

## 2019-01-01 RX ADMIN — POTASSIUM CHLORIDE 40 MEQ: 29.8 INJECTION, SOLUTION INTRAVENOUS at 18:56

## 2019-01-01 RX ADMIN — DIBASIC SODIUM PHOSPHATE, MONOBASIC POTASSIUM PHOSPHATE AND MONOBASIC SODIUM PHOSPHATE 2 TABLET: 852; 155; 130 TABLET ORAL at 17:14

## 2019-01-01 RX ADMIN — MIDAZOLAM HYDROCHLORIDE 1 MG: 1 INJECTION, SOLUTION INTRAMUSCULAR; INTRAVENOUS at 08:52

## 2019-01-01 RX ADMIN — ONDANSETRON 4 MG: 2 INJECTION INTRAMUSCULAR; INTRAVENOUS at 02:17

## 2019-01-01 RX ADMIN — FENTANYL CITRATE 25 MCG: 0.05 INJECTION, SOLUTION INTRAMUSCULAR; INTRAVENOUS at 16:14

## 2019-01-01 RX ADMIN — METOCLOPRAMIDE 10 MG: 5 INJECTION, SOLUTION INTRAMUSCULAR; INTRAVENOUS at 05:22

## 2019-01-01 RX ADMIN — ENOXAPARIN SODIUM 40 MG: 100 INJECTION SUBCUTANEOUS at 05:22

## 2019-01-01 RX ADMIN — METOCLOPRAMIDE 10 MG: 5 INJECTION, SOLUTION INTRAMUSCULAR; INTRAVENOUS at 16:53

## 2019-01-01 RX ADMIN — DEXTROSE AND SODIUM CHLORIDE 125 ML: 10; .45 INJECTION, SOLUTION INTRAVENOUS at 11:06

## 2019-01-01 RX ADMIN — MAGNESIUM SULFATE IN WATER 2 G: 40 INJECTION, SOLUTION INTRAVENOUS at 14:43

## 2019-01-01 RX ADMIN — SODIUM PHOSPHATE, MONOBASIC, MONOHYDRATE AND SODIUM PHOSPHATE, DIBASIC, ANHYDROUS 30 MMOL: 276; 142 INJECTION, SOLUTION INTRAVENOUS at 03:13

## 2019-01-01 RX ADMIN — INSULIN GLARGINE 30 UNITS: 100 INJECTION, SOLUTION SUBCUTANEOUS at 08:25

## 2019-01-01 RX ADMIN — MAGNESIUM SULFATE IN WATER 2 G: 40 INJECTION, SOLUTION INTRAVENOUS at 09:53

## 2019-01-01 RX ADMIN — HEPARIN 500 UNITS: 100 SYRINGE at 17:16

## 2019-01-01 RX ADMIN — SODIUM CHLORIDE 1000 ML: 9 INJECTION, SOLUTION INTRAVENOUS at 18:19

## 2019-01-01 RX ADMIN — SODIUM CHLORIDE 1.5 UNITS/HR: 9 INJECTION, SOLUTION INTRAVENOUS at 10:27

## 2019-01-01 RX ADMIN — SODIUM CHLORIDE 3.5 UNITS/HR: 9 INJECTION, SOLUTION INTRAVENOUS at 08:55

## 2019-01-01 RX ADMIN — DEXTROSE MONOHYDRATE, SODIUM CHLORIDE, SODIUM LACTATE, POTASSIUM CHLORIDE, CALCIUM CHLORIDE: 5; 600; 310; 179; 20 INJECTION, SOLUTION INTRAVENOUS at 10:56

## 2019-01-01 RX ADMIN — POTASSIUM CHLORIDE 20 MEQ: 14.9 INJECTION, SOLUTION INTRAVENOUS at 21:19

## 2019-01-01 RX ADMIN — POTASSIUM CHLORIDE 20 MEQ: 14.9 INJECTION, SOLUTION INTRAVENOUS at 05:59

## 2019-01-01 RX ADMIN — METOCLOPRAMIDE HYDROCHLORIDE 10 MG: 10 TABLET ORAL at 05:59

## 2019-01-01 RX ADMIN — METOCLOPRAMIDE 10 MG: 5 INJECTION, SOLUTION INTRAMUSCULAR; INTRAVENOUS at 00:15

## 2019-01-01 RX ADMIN — POTASSIUM CHLORIDE 40 MEQ: 29.8 INJECTION, SOLUTION INTRAVENOUS at 08:37

## 2019-01-01 RX ADMIN — POTASSIUM CHLORIDE 20 MEQ: 14.9 INJECTION, SOLUTION INTRAVENOUS at 07:51

## 2019-01-01 RX ADMIN — THIAMINE HYDROCHLORIDE 100 MG: 100 INJECTION, SOLUTION INTRAMUSCULAR; INTRAVENOUS at 12:15

## 2019-01-01 RX ADMIN — METOCLOPRAMIDE 10 MG: 5 INJECTION, SOLUTION INTRAMUSCULAR; INTRAVENOUS at 00:09

## 2019-01-01 RX ADMIN — PROCHLORPERAZINE EDISYLATE 10 MG: 5 INJECTION INTRAMUSCULAR; INTRAVENOUS at 00:43

## 2019-01-01 RX ADMIN — POTASSIUM CHLORIDE 20 MEQ: 14.9 INJECTION, SOLUTION INTRAVENOUS at 08:13

## 2019-01-01 RX ADMIN — SODIUM CHLORIDE, POTASSIUM CHLORIDE, SODIUM LACTATE AND CALCIUM CHLORIDE 1000 ML: 600; 310; 30; 20 INJECTION, SOLUTION INTRAVENOUS at 00:55

## 2019-01-01 RX ADMIN — SODIUM CHLORIDE, POTASSIUM CHLORIDE, SODIUM LACTATE AND CALCIUM CHLORIDE: 600; 310; 30; 20 INJECTION, SOLUTION INTRAVENOUS at 04:23

## 2019-01-01 RX ADMIN — MORPHINE SULFATE 4 MG: 4 INJECTION INTRAVENOUS at 12:09

## 2019-01-01 RX ADMIN — SODIUM CHLORIDE, SODIUM LACTATE, POTASSIUM CHLORIDE, CALCIUM CHLORIDE AND DEXTROSE MONOHYDRATE: 5; 600; 310; 30; 20 INJECTION, SOLUTION INTRAVENOUS at 19:51

## 2019-01-01 RX ADMIN — LIDOCAINE 1 PATCH: 50 PATCH CUTANEOUS at 11:20

## 2019-01-01 RX ADMIN — MORPHINE SULFATE 2 MG: 4 INJECTION INTRAVENOUS at 13:23

## 2019-01-01 RX ADMIN — PANTOPRAZOLE SODIUM 20 MG: 40 INJECTION, POWDER, LYOPHILIZED, FOR SOLUTION INTRAVENOUS at 11:17

## 2019-01-01 RX ADMIN — SENNOSIDES, DOCUSATE SODIUM 2 TABLET: 50; 8.6 TABLET, FILM COATED ORAL at 17:12

## 2019-01-01 RX ADMIN — METOCLOPRAMIDE HYDROCHLORIDE 10 MG: 10 TABLET ORAL at 08:25

## 2019-01-01 RX ADMIN — SODIUM CHLORIDE: 4.5 INJECTION, SOLUTION INTRAVENOUS at 14:00

## 2019-01-01 RX ADMIN — SODIUM CHLORIDE, POTASSIUM CHLORIDE, SODIUM LACTATE AND CALCIUM CHLORIDE: 600; 310; 30; 20 INJECTION, SOLUTION INTRAVENOUS at 22:18

## 2019-01-01 RX ADMIN — ENOXAPARIN SODIUM 40 MG: 100 INJECTION SUBCUTANEOUS at 06:23

## 2019-01-01 RX ADMIN — METOCLOPRAMIDE 10 MG: 5 INJECTION, SOLUTION INTRAMUSCULAR; INTRAVENOUS at 05:35

## 2019-01-01 RX ADMIN — METOCLOPRAMIDE 10 MG: 5 INJECTION, SOLUTION INTRAMUSCULAR; INTRAVENOUS at 18:07

## 2019-01-01 RX ADMIN — POTASSIUM CHLORIDE 20 MEQ: 14.9 INJECTION, SOLUTION INTRAVENOUS at 23:28

## 2019-01-01 RX ADMIN — SODIUM CHLORIDE 2000 ML: 9 INJECTION, SOLUTION INTRAVENOUS at 11:00

## 2019-01-01 RX ADMIN — SODIUM CHLORIDE 2000 ML: 9 INJECTION, SOLUTION INTRAVENOUS at 05:45

## 2019-01-01 RX ADMIN — SENNOSIDES, DOCUSATE SODIUM 2 TABLET: 50; 8.6 TABLET, FILM COATED ORAL at 05:22

## 2019-01-01 RX ADMIN — SODIUM CHLORIDE 3 UNITS/HR: 9 INJECTION, SOLUTION INTRAVENOUS at 04:03

## 2019-01-01 RX ADMIN — INSULIN HUMAN 4 UNITS: 100 INJECTION, SOLUTION PARENTERAL at 11:27

## 2019-01-01 RX ADMIN — SODIUM CHLORIDE 2 UNITS/HR: 9 INJECTION, SOLUTION INTRAVENOUS at 16:06

## 2019-01-01 RX ADMIN — DEXTROSE AND SODIUM CHLORIDE: 5; 450 INJECTION, SOLUTION INTRAVENOUS at 16:14

## 2019-01-01 RX ADMIN — OMEPRAZOLE 20 MG: 20 CAPSULE, DELAYED RELEASE ORAL at 05:15

## 2019-01-01 RX ADMIN — MAGNESIUM SULFATE IN WATER 2 G: 40 INJECTION, SOLUTION INTRAVENOUS at 19:45

## 2019-01-01 RX ADMIN — HALOPERIDOL LACTATE 2 MG: 5 INJECTION, SOLUTION INTRAMUSCULAR at 22:17

## 2019-01-01 RX ADMIN — DEXTROSE AND SODIUM CHLORIDE: 10; .45 INJECTION, SOLUTION INTRAVENOUS at 13:15

## 2019-01-01 RX ADMIN — DIBASIC SODIUM PHOSPHATE, MONOBASIC POTASSIUM PHOSPHATE AND MONOBASIC SODIUM PHOSPHATE 1 TABLET: 852; 155; 130 TABLET ORAL at 05:58

## 2019-01-01 RX ADMIN — POTASSIUM CHLORIDE 20 MEQ: 14.9 INJECTION, SOLUTION INTRAVENOUS at 17:17

## 2019-01-01 RX ADMIN — POTASSIUM PHOSPHATE, MONOBASIC AND POTASSIUM PHOSPHATE, DIBASIC 30 MMOL: 224; 236 INJECTION, SOLUTION, CONCENTRATE INTRAVENOUS at 09:52

## 2019-01-01 RX ADMIN — METOCLOPRAMIDE HYDROCHLORIDE 10 MG: 10 TABLET ORAL at 18:02

## 2019-01-01 RX ADMIN — VITAMIN D, TAB 1000IU (100/BT) 2000 UNITS: 25 TAB at 05:35

## 2019-01-01 RX ADMIN — LIDOCAINE HYDROCHLORIDE,EPINEPHRINE BITARTRATE: 10; .01 INJECTION, SOLUTION INFILTRATION; PERINEURAL at 09:18

## 2019-01-01 RX ADMIN — POTASSIUM CHLORIDE 20 MEQ: 200 INJECTION, SOLUTION INTRAVENOUS at 09:33

## 2019-01-01 RX ADMIN — POTASSIUM CHLORIDE 20 MEQ: 14.9 INJECTION, SOLUTION INTRAVENOUS at 04:48

## 2019-01-01 RX ADMIN — MAGNESIUM SULFATE IN WATER 2 G: 40 INJECTION, SOLUTION INTRAVENOUS at 14:56

## 2019-01-01 RX ADMIN — POTASSIUM CHLORIDE 20 MEQ: 14.9 INJECTION, SOLUTION INTRAVENOUS at 03:16

## 2019-01-01 RX ADMIN — OLANZAPINE 5 MG: 5 TABLET, FILM COATED ORAL at 17:15

## 2019-01-01 RX ADMIN — INSULIN HUMAN 3 UNITS: 100 INJECTION, SOLUTION PARENTERAL at 18:06

## 2019-01-01 RX ADMIN — PROCHLORPERAZINE EDISYLATE 10 MG: 5 INJECTION INTRAMUSCULAR; INTRAVENOUS at 04:06

## 2019-01-01 RX ADMIN — POTASSIUM PHOSPHATE, MONOBASIC AND POTASSIUM PHOSPHATE, DIBASIC 30 MMOL: 224; 236 INJECTION, SOLUTION, CONCENTRATE INTRAVENOUS at 20:06

## 2019-01-01 RX ADMIN — PANTOPRAZOLE SODIUM 40 MG: 40 INJECTION, POWDER, LYOPHILIZED, FOR SOLUTION INTRAVENOUS at 05:18

## 2019-01-01 RX ADMIN — METOCLOPRAMIDE 10 MG: 5 INJECTION, SOLUTION INTRAMUSCULAR; INTRAVENOUS at 06:00

## 2019-01-01 RX ADMIN — METOCLOPRAMIDE 10 MG: 5 INJECTION, SOLUTION INTRAMUSCULAR; INTRAVENOUS at 12:18

## 2019-01-01 RX ADMIN — SODIUM PHOSPHATE, MONOBASIC, MONOHYDRATE AND SODIUM PHOSPHATE, DIBASIC, ANHYDROUS 30 MMOL: 276; 142 INJECTION, SOLUTION INTRAVENOUS at 01:40

## 2019-01-01 RX ADMIN — DEXTROSE AND SODIUM CHLORIDE: 10; .45 INJECTION, SOLUTION INTRAVENOUS at 00:01

## 2019-01-01 RX ADMIN — INSULIN HUMAN 3 UNITS: 100 INJECTION, SOLUTION PARENTERAL at 17:24

## 2019-01-01 RX ADMIN — MIDAZOLAM HYDROCHLORIDE 1 MG: 1 INJECTION INTRAMUSCULAR; INTRAVENOUS at 08:52

## 2019-01-01 RX ADMIN — METOCLOPRAMIDE 10 MG: 5 INJECTION, SOLUTION INTRAMUSCULAR; INTRAVENOUS at 23:59

## 2019-01-01 RX ADMIN — DEXTROSE AND SODIUM CHLORIDE: 10; .45 INJECTION, SOLUTION INTRAVENOUS at 00:28

## 2019-01-01 RX ADMIN — POTASSIUM CHLORIDE 20 MEQ: 14.9 INJECTION, SOLUTION INTRAVENOUS at 08:31

## 2019-01-01 RX ADMIN — METOCLOPRAMIDE HYDROCHLORIDE 10 MG: 10 TABLET ORAL at 11:23

## 2019-01-01 RX ADMIN — METOCLOPRAMIDE 10 MG: 5 INJECTION, SOLUTION INTRAMUSCULAR; INTRAVENOUS at 05:24

## 2019-01-01 RX ADMIN — MAGNESIUM SULFATE IN WATER 2 G: 40 INJECTION, SOLUTION INTRAVENOUS at 05:43

## 2019-01-01 RX ADMIN — OMEPRAZOLE 20 MG: 20 CAPSULE, DELAYED RELEASE ORAL at 05:22

## 2019-01-01 RX ADMIN — MORPHINE SULFATE 2 MG: 4 INJECTION INTRAVENOUS at 08:05

## 2019-01-01 RX ADMIN — DEXTROSE AND SODIUM CHLORIDE: 10; .45 INJECTION, SOLUTION INTRAVENOUS at 08:55

## 2019-01-01 RX ADMIN — VITAMIN D, TAB 1000IU (100/BT) 2000 UNITS: 25 TAB at 05:23

## 2019-01-01 RX ADMIN — METOCLOPRAMIDE 10 MG: 5 INJECTION, SOLUTION INTRAMUSCULAR; INTRAVENOUS at 00:43

## 2019-01-01 RX ADMIN — POTASSIUM CHLORIDE 20 MEQ: 14.9 INJECTION, SOLUTION INTRAVENOUS at 06:50

## 2019-01-01 RX ADMIN — SODIUM CHLORIDE 1 UNITS/HR: 9 INJECTION, SOLUTION INTRAVENOUS at 01:30

## 2019-01-01 RX ADMIN — POTASSIUM CHLORIDE 10 MEQ: 7.46 INJECTION, SOLUTION INTRAVENOUS at 13:59

## 2019-01-01 RX ADMIN — POTASSIUM PHOSPHATE, MONOBASIC AND POTASSIUM PHOSPHATE, DIBASIC 30 MMOL: 224; 236 INJECTION, SOLUTION, CONCENTRATE INTRAVENOUS at 11:08

## 2019-01-01 RX ADMIN — METOCLOPRAMIDE HYDROCHLORIDE 10 MG: 10 TABLET ORAL at 13:34

## 2019-01-01 RX ADMIN — POTASSIUM CHLORIDE 40 MEQ: 29.8 INJECTION, SOLUTION INTRAVENOUS at 08:38

## 2019-01-01 RX ADMIN — SODIUM CHLORIDE, POTASSIUM CHLORIDE, SODIUM LACTATE AND CALCIUM CHLORIDE: 600; 310; 30; 20 INJECTION, SOLUTION INTRAVENOUS at 11:01

## 2019-01-01 RX ADMIN — OLANZAPINE 5 MG: 5 TABLET, FILM COATED ORAL at 17:37

## 2019-01-01 RX ADMIN — MAGNESIUM SULFATE IN WATER 2 G: 40 INJECTION, SOLUTION INTRAVENOUS at 11:10

## 2019-01-01 RX ADMIN — PROCHLORPERAZINE EDISYLATE 10 MG: 5 INJECTION INTRAMUSCULAR; INTRAVENOUS at 07:51

## 2019-01-01 RX ADMIN — SODIUM CHLORIDE 2 UNITS/HR: 9 INJECTION, SOLUTION INTRAVENOUS at 12:16

## 2019-01-01 RX ADMIN — OMEPRAZOLE 20 MG: 20 CAPSULE, DELAYED RELEASE ORAL at 11:01

## 2019-01-01 RX ADMIN — PROCHLORPERAZINE EDISYLATE 10 MG: 5 INJECTION INTRAMUSCULAR; INTRAVENOUS at 16:34

## 2019-01-01 RX ADMIN — Medication 16 G: at 04:18

## 2019-01-01 RX ADMIN — METOCLOPRAMIDE 10 MG: 5 INJECTION, SOLUTION INTRAMUSCULAR; INTRAVENOUS at 11:50

## 2019-01-01 RX ADMIN — SODIUM CHLORIDE 6 UNITS/HR: 9 INJECTION, SOLUTION INTRAVENOUS at 06:23

## 2019-01-01 RX ADMIN — DEXTROSE AND SODIUM CHLORIDE: 10; .45 INJECTION, SOLUTION INTRAVENOUS at 05:05

## 2019-01-01 RX ADMIN — POTASSIUM CHLORIDE 10 MEQ: 7.46 INJECTION, SOLUTION INTRAVENOUS at 21:13

## 2019-01-01 RX ADMIN — METOCLOPRAMIDE 10 MG: 5 INJECTION, SOLUTION INTRAMUSCULAR; INTRAVENOUS at 13:23

## 2019-01-01 RX ADMIN — FOLIC ACID 1 MG: 5 INJECTION, SOLUTION INTRAMUSCULAR; INTRAVENOUS; SUBCUTANEOUS at 05:31

## 2019-01-01 RX ADMIN — DEXTROSE AND SODIUM CHLORIDE: 10; .45 INJECTION, SOLUTION INTRAVENOUS at 10:39

## 2019-01-01 RX ADMIN — ERYTHROMYCIN LACTOBIONATE 250 MG: 500 INJECTION, POWDER, LYOPHILIZED, FOR SOLUTION INTRAVENOUS at 05:31

## 2019-01-01 RX ADMIN — SODIUM CHLORIDE 6 UNITS/HR: 9 INJECTION, SOLUTION INTRAVENOUS at 19:07

## 2019-01-01 RX ADMIN — ENOXAPARIN SODIUM 40 MG: 100 INJECTION SUBCUTANEOUS at 16:54

## 2019-01-01 RX ADMIN — CALCIUM CHLORIDE 1 G: 100 INJECTION, SOLUTION INTRAVENOUS at 13:36

## 2019-01-01 RX ADMIN — METOCLOPRAMIDE 10 MG: 5 INJECTION, SOLUTION INTRAMUSCULAR; INTRAVENOUS at 06:23

## 2019-01-01 RX ADMIN — SODIUM CHLORIDE, POTASSIUM CHLORIDE, SODIUM LACTATE AND CALCIUM CHLORIDE 2000 ML: 600; 310; 30; 20 INJECTION, SOLUTION INTRAVENOUS at 11:45

## 2019-01-01 RX ADMIN — PROCHLORPERAZINE EDISYLATE 10 MG: 5 INJECTION INTRAMUSCULAR; INTRAVENOUS at 00:19

## 2019-01-01 RX ADMIN — DEXTROSE AND SODIUM CHLORIDE: 10; .45 INJECTION, SOLUTION INTRAVENOUS at 10:28

## 2019-01-01 RX ADMIN — FAMOTIDINE 20 MG: 10 INJECTION INTRAVENOUS at 05:00

## 2019-01-01 RX ADMIN — MORPHINE SULFATE 2 MG: 4 INJECTION INTRAVENOUS at 22:47

## 2019-01-01 RX ADMIN — POTASSIUM PHOSPHATE, MONOBASIC AND POTASSIUM PHOSPHATE, DIBASIC 15 MMOL: 224; 236 INJECTION, SOLUTION, CONCENTRATE INTRAVENOUS at 03:38

## 2019-01-01 RX ADMIN — MORPHINE SULFATE 2 MG: 4 INJECTION INTRAVENOUS at 20:47

## 2019-01-01 RX ADMIN — PROCHLORPERAZINE EDISYLATE 10 MG: 5 INJECTION INTRAMUSCULAR; INTRAVENOUS at 16:07

## 2019-01-01 RX ADMIN — METOCLOPRAMIDE 10 MG: 5 INJECTION, SOLUTION INTRAMUSCULAR; INTRAVENOUS at 00:11

## 2019-01-01 RX ADMIN — METOCLOPRAMIDE 10 MG: 5 INJECTION, SOLUTION INTRAMUSCULAR; INTRAVENOUS at 17:11

## 2019-01-01 RX ADMIN — SODIUM CHLORIDE 3.5 UNITS/HR: 9 INJECTION, SOLUTION INTRAVENOUS at 08:18

## 2019-01-01 RX ADMIN — INSULIN HUMAN 10 UNITS: 100 INJECTION, SOLUTION PARENTERAL at 17:44

## 2019-01-01 RX ADMIN — METOCLOPRAMIDE 10 MG: 5 INJECTION, SOLUTION INTRAMUSCULAR; INTRAVENOUS at 17:12

## 2019-01-01 RX ADMIN — PROCHLORPERAZINE EDISYLATE 10 MG: 5 INJECTION INTRAMUSCULAR; INTRAVENOUS at 08:48

## 2019-01-01 RX ADMIN — INSULIN HUMAN 10 UNITS: 100 INJECTION, SOLUTION PARENTERAL at 13:35

## 2019-01-01 RX ADMIN — DIBASIC SODIUM PHOSPHATE, MONOBASIC POTASSIUM PHOSPHATE AND MONOBASIC SODIUM PHOSPHATE 1 TABLET: 852; 155; 130 TABLET ORAL at 11:27

## 2019-01-01 RX ADMIN — LIDOCAINE 1 PATCH: 50 PATCH CUTANEOUS at 13:17

## 2019-01-01 RX ADMIN — POTASSIUM CHLORIDE 40 MEQ: 1500 TABLET, EXTENDED RELEASE ORAL at 11:27

## 2019-01-01 RX ADMIN — ERYTHROMYCIN LACTOBIONATE 250 MG: 500 INJECTION, POWDER, LYOPHILIZED, FOR SOLUTION INTRAVENOUS at 14:38

## 2019-01-01 RX ADMIN — ERYTHROMYCIN LACTOBIONATE 250 MG: 500 INJECTION, POWDER, LYOPHILIZED, FOR SOLUTION INTRAVENOUS at 22:15

## 2019-01-01 RX ADMIN — PROCHLORPERAZINE EDISYLATE 10 MG: 5 INJECTION INTRAMUSCULAR; INTRAVENOUS at 23:30

## 2019-01-01 RX ADMIN — INSULIN HUMAN 3 UNITS: 100 INJECTION, SOLUTION PARENTERAL at 20:39

## 2019-01-01 RX ADMIN — DIBASIC SODIUM PHOSPHATE, MONOBASIC POTASSIUM PHOSPHATE AND MONOBASIC SODIUM PHOSPHATE 1 TABLET: 852; 155; 130 TABLET ORAL at 18:18

## 2019-01-01 RX ADMIN — POTASSIUM CHLORIDE 20 MEQ: 14.9 INJECTION, SOLUTION INTRAVENOUS at 12:03

## 2019-01-01 RX ADMIN — POTASSIUM CHLORIDE AND SODIUM CHLORIDE: 900; 150 INJECTION, SOLUTION INTRAVENOUS at 13:24

## 2019-01-01 RX ADMIN — MORPHINE SULFATE 2 MG: 4 INJECTION INTRAVENOUS at 09:12

## 2019-01-01 RX ADMIN — SODIUM CHLORIDE, POTASSIUM CHLORIDE, SODIUM LACTATE AND CALCIUM CHLORIDE: 600; 310; 30; 20 INJECTION, SOLUTION INTRAVENOUS at 19:15

## 2019-01-01 RX ADMIN — POTASSIUM CHLORIDE 10 MEQ: 10 INJECTION, SOLUTION INTRAVENOUS at 13:15

## 2019-01-01 RX ADMIN — PROCHLORPERAZINE EDISYLATE 10 MG: 5 INJECTION INTRAMUSCULAR; INTRAVENOUS at 20:40

## 2019-01-01 RX ADMIN — INSULIN GLARGINE 30 UNITS: 100 INJECTION, SOLUTION SUBCUTANEOUS at 06:02

## 2019-01-01 RX ADMIN — SODIUM CHLORIDE, SODIUM LACTATE, POTASSIUM CHLORIDE, CALCIUM CHLORIDE AND DEXTROSE MONOHYDRATE: 5; 600; 310; 30; 20 INJECTION, SOLUTION INTRAVENOUS at 01:33

## 2019-01-01 RX ADMIN — METOCLOPRAMIDE 10 MG: 5 INJECTION, SOLUTION INTRAMUSCULAR; INTRAVENOUS at 00:08

## 2019-01-01 RX ADMIN — MAGNESIUM SULFATE IN WATER 2 G: 40 INJECTION, SOLUTION INTRAVENOUS at 05:17

## 2019-01-01 RX ADMIN — SODIUM CHLORIDE 1000 ML: 9 INJECTION, SOLUTION INTRAVENOUS at 11:15

## 2019-01-01 RX ADMIN — MORPHINE SULFATE 2 MG: 4 INJECTION INTRAVENOUS at 14:16

## 2019-01-01 RX ADMIN — FOLIC ACID 1 MG: 5 INJECTION, SOLUTION INTRAMUSCULAR; INTRAVENOUS; SUBCUTANEOUS at 06:21

## 2019-01-01 RX ADMIN — POTASSIUM CHLORIDE 20 MEQ: 14.9 INJECTION, SOLUTION INTRAVENOUS at 14:28

## 2019-01-01 RX ADMIN — SENNOSIDES, DOCUSATE SODIUM 2 TABLET: 50; 8.6 TABLET, FILM COATED ORAL at 16:52

## 2019-01-01 RX ADMIN — PROCHLORPERAZINE EDISYLATE 10 MG: 5 INJECTION INTRAMUSCULAR; INTRAVENOUS at 19:33

## 2019-01-01 RX ADMIN — SENNOSIDES 8.6 MG: 8.6 TABLET, FILM COATED ORAL at 12:12

## 2019-01-01 RX ADMIN — PROCHLORPERAZINE EDISYLATE 10 MG: 5 INJECTION INTRAMUSCULAR; INTRAVENOUS at 23:47

## 2019-01-01 RX ADMIN — ERYTHROMYCIN LACTOBIONATE 250 MG: 500 INJECTION, POWDER, LYOPHILIZED, FOR SOLUTION INTRAVENOUS at 14:08

## 2019-01-01 RX ADMIN — MAGNESIUM SULFATE IN WATER 4 G: 40 INJECTION, SOLUTION INTRAVENOUS at 10:04

## 2019-01-01 RX ADMIN — POTASSIUM CHLORIDE 10 MEQ: 10 INJECTION, SOLUTION INTRAVENOUS at 15:50

## 2019-01-01 RX ADMIN — OMEPRAZOLE 20 MG: 20 CAPSULE, DELAYED RELEASE ORAL at 04:53

## 2019-01-01 RX ADMIN — PROCHLORPERAZINE EDISYLATE 10 MG: 5 INJECTION INTRAMUSCULAR; INTRAVENOUS at 10:31

## 2019-01-01 RX ADMIN — DEXTROSE AND SODIUM CHLORIDE: 10; .45 INJECTION, SOLUTION INTRAVENOUS at 02:59

## 2019-01-01 RX ADMIN — SODIUM CHLORIDE 3 UNITS/HR: 9 INJECTION, SOLUTION INTRAVENOUS at 00:08

## 2019-01-01 RX ADMIN — SODIUM CHLORIDE, POTASSIUM CHLORIDE, SODIUM LACTATE AND CALCIUM CHLORIDE: 600; 310; 30; 20 INJECTION, SOLUTION INTRAVENOUS at 10:13

## 2019-01-01 RX ADMIN — POTASSIUM CHLORIDE 40 MEQ: 1500 TABLET, EXTENDED RELEASE ORAL at 04:21

## 2019-01-01 RX ADMIN — MORPHINE SULFATE 2 MG: 4 INJECTION INTRAVENOUS at 18:18

## 2019-01-01 RX ADMIN — INSULIN GLARGINE 15 UNITS: 100 INJECTION, SOLUTION SUBCUTANEOUS at 15:00

## 2019-01-01 RX ADMIN — POTASSIUM CHLORIDE 40 MEQ: 29.8 INJECTION, SOLUTION INTRAVENOUS at 18:15

## 2019-01-01 RX ADMIN — DIBASIC SODIUM PHOSPHATE, MONOBASIC POTASSIUM PHOSPHATE AND MONOBASIC SODIUM PHOSPHATE 2 TABLET: 852; 155; 130 TABLET ORAL at 13:00

## 2019-01-01 RX ADMIN — DEXTROSE AND SODIUM CHLORIDE: 5; 450 INJECTION, SOLUTION INTRAVENOUS at 15:35

## 2019-01-01 RX ADMIN — METOCLOPRAMIDE HYDROCHLORIDE 10 MG: 10 TABLET ORAL at 13:00

## 2019-01-01 RX ADMIN — ONDANSETRON 4 MG: 2 INJECTION INTRAMUSCULAR; INTRAVENOUS at 05:45

## 2019-01-01 RX ADMIN — METOCLOPRAMIDE 10 MG: 5 INJECTION, SOLUTION INTRAMUSCULAR; INTRAVENOUS at 12:17

## 2019-01-01 RX ADMIN — METOCLOPRAMIDE 10 MG: 5 INJECTION, SOLUTION INTRAMUSCULAR; INTRAVENOUS at 06:09

## 2019-01-01 RX ADMIN — FAMOTIDINE 20 MG: 20 TABLET, FILM COATED ORAL at 17:35

## 2019-01-01 RX ADMIN — INSULIN GLARGINE 20 UNITS: 100 INJECTION, SOLUTION SUBCUTANEOUS at 17:07

## 2019-01-01 RX ADMIN — SODIUM CHLORIDE, SODIUM LACTATE, POTASSIUM CHLORIDE, CALCIUM CHLORIDE AND DEXTROSE MONOHYDRATE: 5; 600; 310; 30; 20 INJECTION, SOLUTION INTRAVENOUS at 11:55

## 2019-01-01 RX ADMIN — METOCLOPRAMIDE 10 MG: 5 INJECTION, SOLUTION INTRAMUSCULAR; INTRAVENOUS at 17:30

## 2019-01-01 RX ADMIN — INSULIN HUMAN 14 UNITS: 100 INJECTION, SOLUTION PARENTERAL at 17:12

## 2019-01-01 RX ADMIN — SODIUM CHLORIDE 4 UNITS/HR: 9 INJECTION, SOLUTION INTRAVENOUS at 19:58

## 2019-01-01 RX ADMIN — PROCHLORPERAZINE EDISYLATE 10 MG: 5 INJECTION INTRAMUSCULAR; INTRAVENOUS at 05:15

## 2019-01-01 RX ADMIN — Medication 1 TABLET: at 20:32

## 2019-01-01 RX ADMIN — DEXTROSE AND SODIUM CHLORIDE: 10; .45 INJECTION, SOLUTION INTRAVENOUS at 18:56

## 2019-01-01 RX ADMIN — ENOXAPARIN SODIUM 40 MG: 100 INJECTION SUBCUTANEOUS at 17:16

## 2019-01-01 RX ADMIN — METOCLOPRAMIDE 10 MG: 5 INJECTION, SOLUTION INTRAMUSCULAR; INTRAVENOUS at 04:59

## 2019-01-01 RX ADMIN — OLANZAPINE 5 MG: 5 TABLET, ORALLY DISINTEGRATING ORAL at 18:40

## 2019-01-01 RX ADMIN — SODIUM CHLORIDE 2 UNITS/HR: 9 INJECTION, SOLUTION INTRAVENOUS at 22:00

## 2019-01-01 RX ADMIN — METOCLOPRAMIDE 10 MG: 5 INJECTION, SOLUTION INTRAMUSCULAR; INTRAVENOUS at 23:22

## 2019-01-01 RX ADMIN — SODIUM PHOSPHATE, MONOBASIC, MONOHYDRATE AND SODIUM PHOSPHATE, DIBASIC, ANHYDROUS 30 MMOL: 276; 142 INJECTION, SOLUTION INTRAVENOUS at 03:32

## 2019-01-01 RX ADMIN — LIDOCAINE 1 PATCH: 50 PATCH CUTANEOUS at 12:00

## 2019-01-01 RX ADMIN — DEXTROSE MONOHYDRATE, SODIUM CHLORIDE, SODIUM LACTATE, POTASSIUM CHLORIDE, CALCIUM CHLORIDE: 5; 600; 310; 179; 20 INJECTION, SOLUTION INTRAVENOUS at 00:22

## 2019-01-01 RX ADMIN — OXYCODONE HYDROCHLORIDE 5 MG: 5 TABLET ORAL at 13:33

## 2019-01-01 RX ADMIN — ENOXAPARIN SODIUM 40 MG: 100 INJECTION SUBCUTANEOUS at 17:09

## 2019-01-01 RX ADMIN — DEXTROSE MONOHYDRATE: 100 INJECTION, SOLUTION INTRAVENOUS at 12:03

## 2019-01-01 RX ADMIN — METOCLOPRAMIDE 10 MG: 5 INJECTION, SOLUTION INTRAMUSCULAR; INTRAVENOUS at 17:37

## 2019-01-01 RX ADMIN — DIBASIC SODIUM PHOSPHATE, MONOBASIC POTASSIUM PHOSPHATE AND MONOBASIC SODIUM PHOSPHATE 1 TABLET: 852; 155; 130 TABLET ORAL at 11:23

## 2019-01-01 RX ADMIN — SODIUM CHLORIDE, SODIUM LACTATE, POTASSIUM CHLORIDE, CALCIUM CHLORIDE AND DEXTROSE MONOHYDRATE: 5; 600; 310; 30; 20 INJECTION, SOLUTION INTRAVENOUS at 23:59

## 2019-01-01 RX ADMIN — PANTOPRAZOLE SODIUM 40 MG: 40 INJECTION, POWDER, LYOPHILIZED, FOR SOLUTION INTRAVENOUS at 13:06

## 2019-01-01 RX ADMIN — SODIUM CHLORIDE, POTASSIUM CHLORIDE, SODIUM LACTATE AND CALCIUM CHLORIDE: 600; 310; 30; 20 INJECTION, SOLUTION INTRAVENOUS at 04:14

## 2019-01-01 RX ADMIN — POTASSIUM CHLORIDE 20 MEQ: 14.9 INJECTION, SOLUTION INTRAVENOUS at 14:02

## 2019-01-01 RX ADMIN — OLANZAPINE 5 MG: 5 TABLET, FILM COATED ORAL at 17:35

## 2019-01-01 RX ADMIN — DEXTROSE AND SODIUM CHLORIDE: 10; .45 INJECTION, SOLUTION INTRAVENOUS at 09:27

## 2019-01-01 RX ADMIN — POTASSIUM CHLORIDE 20 MEQ: 14.9 INJECTION, SOLUTION INTRAVENOUS at 16:43

## 2019-01-01 RX ADMIN — FAMOTIDINE 20 MG: 10 INJECTION INTRAVENOUS at 05:24

## 2019-01-01 RX ADMIN — SODIUM CHLORIDE, POTASSIUM CHLORIDE, SODIUM LACTATE AND CALCIUM CHLORIDE 500 ML: 600; 310; 30; 20 INJECTION, SOLUTION INTRAVENOUS at 22:11

## 2019-01-01 RX ADMIN — POTASSIUM CHLORIDE 10 MEQ: 7.46 INJECTION, SOLUTION INTRAVENOUS at 22:17

## 2019-01-01 RX ADMIN — MORPHINE SULFATE 2 MG: 4 INJECTION INTRAVENOUS at 12:18

## 2019-01-01 RX ADMIN — ENOXAPARIN SODIUM 40 MG: 100 INJECTION SUBCUTANEOUS at 11:02

## 2019-01-01 RX ADMIN — MAGNESIUM SULFATE IN WATER 2 G: 40 INJECTION, SOLUTION INTRAVENOUS at 22:33

## 2019-01-01 RX ADMIN — INSULIN GLARGINE 20 UNITS: 100 INJECTION, SOLUTION SUBCUTANEOUS at 09:29

## 2019-01-01 RX ADMIN — MAGNESIUM SULFATE IN DEXTROSE 1 G: 10 INJECTION, SOLUTION INTRAVENOUS at 09:16

## 2019-01-01 RX ADMIN — INSULIN HUMAN 3 UNITS: 100 INJECTION, SOLUTION PARENTERAL at 21:07

## 2019-01-01 RX ADMIN — MORPHINE SULFATE 2 MG: 4 INJECTION INTRAVENOUS at 00:13

## 2019-01-01 RX ADMIN — DEXTROSE AND SODIUM CHLORIDE 100 ML: 10; .45 INJECTION, SOLUTION INTRAVENOUS at 19:14

## 2019-01-01 RX ADMIN — POTASSIUM CHLORIDE 10 MEQ: 10 INJECTION, SOLUTION INTRAVENOUS at 17:50

## 2019-01-01 RX ADMIN — DEXTROSE AND SODIUM CHLORIDE 150 ML: 10; .45 INJECTION, SOLUTION INTRAVENOUS at 18:55

## 2019-01-01 RX ADMIN — METOCLOPRAMIDE 10 MG: 5 INJECTION, SOLUTION INTRAMUSCULAR; INTRAVENOUS at 11:49

## 2019-01-01 RX ADMIN — METOCLOPRAMIDE HYDROCHLORIDE 10 MG: 10 TABLET ORAL at 18:18

## 2019-01-01 RX ADMIN — ONDANSETRON 4 MG: 2 INJECTION INTRAMUSCULAR; INTRAVENOUS at 21:08

## 2019-01-01 RX ADMIN — INSULIN GLARGINE 10 UNITS: 100 INJECTION, SOLUTION SUBCUTANEOUS at 18:41

## 2019-01-01 RX ADMIN — OLANZAPINE 5 MG: 5 TABLET, ORALLY DISINTEGRATING ORAL at 17:35

## 2019-01-01 RX ADMIN — POTASSIUM CHLORIDE 20 MEQ: 14.9 INJECTION, SOLUTION INTRAVENOUS at 19:23

## 2019-01-01 RX ADMIN — INSULIN HUMAN 7.5 UNITS: 100 INJECTION, SOLUTION PARENTERAL at 08:08

## 2019-01-01 RX ADMIN — DEXTROSE AND SODIUM CHLORIDE: 10; .45 INJECTION, SOLUTION INTRAVENOUS at 18:58

## 2019-01-01 RX ADMIN — SODIUM PHOSPHATE, MONOBASIC, MONOHYDRATE AND SODIUM PHOSPHATE, DIBASIC, ANHYDROUS 15 MMOL: 276; 142 INJECTION, SOLUTION INTRAVENOUS at 12:10

## 2019-01-01 RX ADMIN — METOCLOPRAMIDE HYDROCHLORIDE 10 MG: 10 TABLET ORAL at 17:14

## 2019-01-01 RX ADMIN — POTASSIUM CHLORIDE 10 MEQ: 10 INJECTION, SOLUTION INTRAVENOUS at 12:06

## 2019-01-01 RX ADMIN — FAMOTIDINE 20 MG: 10 INJECTION INTRAVENOUS at 17:00

## 2019-01-01 RX ADMIN — POTASSIUM CHLORIDE 10 MEQ: 10 INJECTION, SOLUTION INTRAVENOUS at 13:22

## 2019-01-01 RX ADMIN — ERYTHROMYCIN LACTOBIONATE 250 MG: 500 INJECTION, POWDER, LYOPHILIZED, FOR SOLUTION INTRAVENOUS at 21:26

## 2019-01-01 RX ADMIN — METOCLOPRAMIDE 10 MG: 5 INJECTION, SOLUTION INTRAMUSCULAR; INTRAVENOUS at 05:44

## 2019-01-01 RX ADMIN — FAMOTIDINE 20 MG: 10 INJECTION INTRAVENOUS at 17:11

## 2019-01-01 RX ADMIN — THIAMINE HYDROCHLORIDE 100 MG: 100 INJECTION, SOLUTION INTRAMUSCULAR; INTRAVENOUS at 04:07

## 2019-01-01 RX ADMIN — METOCLOPRAMIDE 5 MG: 5 INJECTION, SOLUTION INTRAMUSCULAR; INTRAVENOUS at 19:21

## 2019-01-01 RX ADMIN — PROCHLORPERAZINE EDISYLATE 10 MG: 5 INJECTION INTRAMUSCULAR; INTRAVENOUS at 07:58

## 2019-01-01 RX ADMIN — MORPHINE SULFATE 2 MG: 4 INJECTION INTRAVENOUS at 08:44

## 2019-01-01 RX ADMIN — POTASSIUM CHLORIDE 10 MEQ: 10 INJECTION, SOLUTION INTRAVENOUS at 14:48

## 2019-01-01 RX ADMIN — FENTANYL CITRATE 25 MCG: 0.05 INJECTION, SOLUTION INTRAMUSCULAR; INTRAVENOUS at 09:12

## 2019-01-01 SDOH — ECONOMIC STABILITY: TRANSPORTATION INSECURITY
IN THE PAST 12 MONTHS, HAS LACK OF TRANSPORTATION KEPT YOU FROM MEETINGS, WORK, OR FROM GETTING THINGS NEEDED FOR DAILY LIVING?: NO

## 2019-01-01 SDOH — ECONOMIC STABILITY: FOOD INSECURITY: WITHIN THE PAST 12 MONTHS, THE FOOD YOU BOUGHT JUST DIDN'T LAST AND YOU DIDN'T HAVE MONEY TO GET MORE.: NEVER TRUE

## 2019-01-01 SDOH — ECONOMIC STABILITY: FOOD INSECURITY: WITHIN THE PAST 12 MONTHS, YOU WORRIED THAT YOUR FOOD WOULD RUN OUT BEFORE YOU GOT MONEY TO BUY MORE.: NEVER TRUE

## 2019-01-01 SDOH — ECONOMIC STABILITY: TRANSPORTATION INSECURITY
IN THE PAST 12 MONTHS, HAS THE LACK OF TRANSPORTATION KEPT YOU FROM MEDICAL APPOINTMENTS OR FROM GETTING MEDICATIONS?: NO

## 2019-01-01 SDOH — ECONOMIC STABILITY: INCOME INSECURITY: HOW HARD IS IT FOR YOU TO PAY FOR THE VERY BASICS LIKE FOOD, HOUSING, MEDICAL CARE, AND HEATING?: NOT HARD AT ALL

## 2019-01-01 ASSESSMENT — COGNITIVE AND FUNCTIONAL STATUS - GENERAL
MOBILITY SCORE: 18
STANDING UP FROM CHAIR USING ARMS: A LITTLE
DRESSING REGULAR UPPER BODY CLOTHING: A LITTLE
SUGGESTED CMS G CODE MODIFIER DAILY ACTIVITY: CK
SUGGESTED CMS G CODE MODIFIER MOBILITY: CH
TOILETING: A LOT
HELP NEEDED FOR BATHING: A LITTLE
DAILY ACTIVITIY SCORE: 24
MOVING FROM LYING ON BACK TO SITTING ON SIDE OF FLAT BED: A LITTLE
MOBILITY SCORE: 24
SUGGESTED CMS G CODE MODIFIER DAILY ACTIVITY: CH
WALKING IN HOSPITAL ROOM: A LOT
MOBILITY SCORE: 24
SUGGESTED CMS G CODE MODIFIER MOBILITY: CK
SUGGESTED CMS G CODE MODIFIER DAILY ACTIVITY: CH
DRESSING REGULAR LOWER BODY CLOTHING: A LITTLE
MOVING TO AND FROM BED TO CHAIR: A LITTLE
DAILY ACTIVITIY SCORE: 18
DAILY ACTIVITIY SCORE: 24
SUGGESTED CMS G CODE MODIFIER MOBILITY: CH
CLIMB 3 TO 5 STEPS WITH RAILING: A LITTLE
EATING MEALS: A LITTLE

## 2019-01-01 ASSESSMENT — ENCOUNTER SYMPTOMS
HEADACHES: 0
FEVER: 0
SHORTNESS OF BREATH: 0
FEVER: 0
TINGLING: 0
DIZZINESS: 0
BACK PAIN: 0
NAUSEA: 1
VOMITING: 1
NAUSEA: 1
DIZZINESS: 0
DIZZINESS: 0
ABDOMINAL PAIN: 0
VOMITING: 0
PALPITATIONS: 0
DOUBLE VISION: 0
HEMOPTYSIS: 0
NAUSEA: 1
PALPITATIONS: 0
HEADACHES: 0
BACK PAIN: 0
SHORTNESS OF BREATH: 0
WEAKNESS: 1
ABDOMINAL PAIN: 1
ABDOMINAL PAIN: 1
DIARRHEA: 1
CONSTIPATION: 0
DEPRESSION: 0
BRUISES/BLEEDS EASILY: 0
COUGH: 0
TINGLING: 0
CHILLS: 0
EYE PAIN: 0
SPUTUM PRODUCTION: 0
ORTHOPNEA: 0
COUGH: 0
CHILLS: 0
ABDOMINAL PAIN: 1
PALPITATIONS: 0
SPUTUM PRODUCTION: 0
FOCAL WEAKNESS: 0
SINUS PAIN: 0
FOCAL WEAKNESS: 0
ABDOMINAL PAIN: 0
FEVER: 0
NECK PAIN: 0
SORE THROAT: 1
MYALGIAS: 0
PALPITATIONS: 0
COUGH: 0
PHOTOPHOBIA: 0
SHORTNESS OF BREATH: 0
FLANK PAIN: 0
DIZZINESS: 0
BLOOD IN STOOL: 0
PALPITATIONS: 0
NAUSEA: 1
FLANK PAIN: 0
ABDOMINAL PAIN: 0
MYALGIAS: 0
LOSS OF CONSCIOUSNESS: 0
INSOMNIA: 0
DEPRESSION: 0
DIARRHEA: 0
BLURRED VISION: 0
SORE THROAT: 0
PHOTOPHOBIA: 0
INSOMNIA: 0
BACK PAIN: 0
DIZZINESS: 0
ABDOMINAL PAIN: 1
CONSTIPATION: 0
VOMITING: 0
DIARRHEA: 0
DEPRESSION: 0
HEADACHES: 0
VOMITING: 1
FOCAL WEAKNESS: 0
PALPITATIONS: 0
FEVER: 0
ABDOMINAL PAIN: 1
FALLS: 0
HEMOPTYSIS: 0
BLOOD IN STOOL: 0
DIZZINESS: 0
SHORTNESS OF BREATH: 0
NAUSEA: 0
HEARTBURN: 0
ABDOMINAL PAIN: 1
DOUBLE VISION: 0
CHILLS: 0
DEPRESSION: 0
DIZZINESS: 0
PALPITATIONS: 0
PALPITATIONS: 0
FEVER: 0
MYALGIAS: 0
COUGH: 0
SHORTNESS OF BREATH: 0
BLURRED VISION: 0
SPUTUM PRODUCTION: 0
CHILLS: 0
SORE THROAT: 0
SPEECH CHANGE: 0
FEVER: 0
MYALGIAS: 0
BLOOD IN STOOL: 0
DIARRHEA: 0
DIZZINESS: 0
MYALGIAS: 0
PALPITATIONS: 0
ABDOMINAL PAIN: 1
HEADACHES: 0
VOMITING: 0
HEADACHES: 0
SPEECH CHANGE: 0
SORE THROAT: 1
ORTHOPNEA: 0
DOUBLE VISION: 0
PALPITATIONS: 0
STRIDOR: 0
PALPITATIONS: 0
VOMITING: 0
DIARRHEA: 0
SORE THROAT: 0
CONSTIPATION: 0
SHORTNESS OF BREATH: 0
BLURRED VISION: 0
DEPRESSION: 0
DIZZINESS: 0
HEARTBURN: 0
CONSTIPATION: 1
HEADACHES: 0
STRIDOR: 0
BACK PAIN: 1
SORE THROAT: 0
BACK PAIN: 0
HEADACHES: 0
BLURRED VISION: 0
PALPITATIONS: 0
SORE THROAT: 0
VOMITING: 0
COUGH: 0
NAUSEA: 0
STRIDOR: 0
NAUSEA: 1
DIARRHEA: 0
ABDOMINAL PAIN: 1
COUGH: 0
INSOMNIA: 0
NECK PAIN: 0
DOUBLE VISION: 0
NERVOUS/ANXIOUS: 0
SPUTUM PRODUCTION: 0
LOSS OF CONSCIOUSNESS: 0
SHORTNESS OF BREATH: 0
SINUS PAIN: 0
CHILLS: 0
STRIDOR: 0
VOMITING: 1
CONSTIPATION: 0
ABDOMINAL PAIN: 1
ABDOMINAL PAIN: 1
SORE THROAT: 1
DEPRESSION: 0
DEPRESSION: 0
CHILLS: 0
DIZZINESS: 0
NECK PAIN: 0
VOMITING: 1
SINUS PAIN: 0
FLANK PAIN: 0
FALLS: 0
NAUSEA: 1
DEPRESSION: 0
EYES NEGATIVE: 1
FEVER: 0
SHORTNESS OF BREATH: 0
WEIGHT LOSS: 0
NAUSEA: 1
DEPRESSION: 0
HEADACHES: 0
TREMORS: 0
MYALGIAS: 0
PALPITATIONS: 0
NERVOUS/ANXIOUS: 0
DOUBLE VISION: 0
VOMITING: 1
NERVOUS/ANXIOUS: 0
SPUTUM PRODUCTION: 0
BACK PAIN: 0
FOCAL WEAKNESS: 0
CHILLS: 0
SENSORY CHANGE: 0
FEVER: 0
DIARRHEA: 0
BACK PAIN: 1
BLURRED VISION: 0
HEADACHES: 0
BACK PAIN: 0
FOCAL WEAKNESS: 0
ABDOMINAL PAIN: 0
NAUSEA: 1
COUGH: 1
COUGH: 0
NAUSEA: 1
TINGLING: 0
ABDOMINAL PAIN: 0
MYALGIAS: 1
NAUSEA: 1
DIARRHEA: 0
NAUSEA: 1
CONSTIPATION: 0
SHORTNESS OF BREATH: 0
VOMITING: 1
NECK PAIN: 0
SHORTNESS OF BREATH: 0
HEADACHES: 0
CHILLS: 0
NAUSEA: 1
CHILLS: 0
FEVER: 0
TREMORS: 0
ORTHOPNEA: 0
HEARTBURN: 1
NECK PAIN: 0
MYALGIAS: 0
PALPITATIONS: 0
DIARRHEA: 0
COUGH: 0
DIARRHEA: 1
ABDOMINAL PAIN: 0
NERVOUS/ANXIOUS: 0
BLURRED VISION: 0
FEVER: 0
FOCAL WEAKNESS: 0
NEUROLOGICAL NEGATIVE: 1
ABDOMINAL PAIN: 1
HEMOPTYSIS: 0
FEVER: 0
CHILLS: 0
DIZZINESS: 0
ABDOMINAL PAIN: 0
NAUSEA: 1
COUGH: 1
FEVER: 0
HALLUCINATIONS: 0
SHORTNESS OF BREATH: 0
FLANK PAIN: 0
NERVOUS/ANXIOUS: 0
NAUSEA: 1
NAUSEA: 0
NECK PAIN: 0
HEMOPTYSIS: 0
SHORTNESS OF BREATH: 0
DIARRHEA: 0
ORTHOPNEA: 0
ABDOMINAL PAIN: 0
SORE THROAT: 0
SHORTNESS OF BREATH: 0
SHORTNESS OF BREATH: 0
HEMOPTYSIS: 0
PALPITATIONS: 0
BLURRED VISION: 0
VOMITING: 1
VOMITING: 1
HALLUCINATIONS: 0
PALPITATIONS: 0
CHILLS: 1
DOUBLE VISION: 0
VOMITING: 1
WEAKNESS: 0
HEMOPTYSIS: 0
CONSTIPATION: 0
FOCAL WEAKNESS: 0
HEADACHES: 1
WEAKNESS: 1
SPEECH CHANGE: 0
FOCAL WEAKNESS: 0
ABDOMINAL PAIN: 0
DIAPHORESIS: 0
TREMORS: 0
VOMITING: 1
SINUS PAIN: 0
HEADACHES: 0
SENSORY CHANGE: 0
DIAPHORESIS: 1
EYE REDNESS: 0
SPUTUM PRODUCTION: 0
DOUBLE VISION: 0
MYALGIAS: 0
FEVER: 0
BACK PAIN: 0
VOMITING: 1
ORTHOPNEA: 0
HEARTBURN: 0
BLOOD IN STOOL: 0
NERVOUS/ANXIOUS: 0
SPEECH CHANGE: 0
DIZZINESS: 0
COUGH: 0
DOUBLE VISION: 0
COUGH: 0
HEADACHES: 0
BLURRED VISION: 0
CHILLS: 0
HEARTBURN: 0
SHORTNESS OF BREATH: 0
ABDOMINAL PAIN: 1
ABDOMINAL PAIN: 1
COUGH: 1
TREMORS: 0
CHILLS: 0
FEVER: 0
NAUSEA: 1
ABDOMINAL PAIN: 1
FOCAL WEAKNESS: 0
FOCAL WEAKNESS: 0
SORE THROAT: 1
PSYCHIATRIC NEGATIVE: 1
DIARRHEA: 0
WHEEZING: 0
HEARTBURN: 0
SORE THROAT: 0
FEVER: 0
SPUTUM PRODUCTION: 0
ABDOMINAL PAIN: 1
DIZZINESS: 0
SEIZURES: 0
SPUTUM PRODUCTION: 0
NERVOUS/ANXIOUS: 0
SEIZURES: 0
CHILLS: 0
NECK PAIN: 0
SPUTUM PRODUCTION: 0
MYALGIAS: 0
MYALGIAS: 0
CHILLS: 1
SENSORY CHANGE: 0
NAUSEA: 1
WHEEZING: 0
VOMITING: 0
VOMITING: 1
FOCAL WEAKNESS: 0
SPEECH CHANGE: 0
CHILLS: 0
COUGH: 1
SORE THROAT: 1
BACK PAIN: 0
PALPITATIONS: 0
BLOOD IN STOOL: 0
BLURRED VISION: 0
PND: 0
VOMITING: 1
INSOMNIA: 0
NERVOUS/ANXIOUS: 0
HEMOPTYSIS: 0
LOSS OF CONSCIOUSNESS: 0
NAUSEA: 0
NERVOUS/ANXIOUS: 0
DOUBLE VISION: 0
HEMOPTYSIS: 0
BLURRED VISION: 0
BLURRED VISION: 0
COUGH: 0
DIZZINESS: 1
COUGH: 0
DIARRHEA: 0
BLURRED VISION: 0
SENSORY CHANGE: 0
FEVER: 0
BLURRED VISION: 0
BLURRED VISION: 0
CHILLS: 0
DOUBLE VISION: 0
FOCAL WEAKNESS: 0
SORE THROAT: 0
DIZZINESS: 0
MYALGIAS: 1
DOUBLE VISION: 0
ABDOMINAL PAIN: 0
SPEECH CHANGE: 0
HEMOPTYSIS: 0
SHORTNESS OF BREATH: 0
FOCAL WEAKNESS: 0
HEMOPTYSIS: 0
SPUTUM PRODUCTION: 0
FOCAL WEAKNESS: 0
DOUBLE VISION: 0
WEAKNESS: 1
VOMITING: 1
DIARRHEA: 0
VOMITING: 0
VOMITING: 1
DEPRESSION: 0
BLOOD IN STOOL: 0
PALPITATIONS: 0
SHORTNESS OF BREATH: 0
FEVER: 0
BLURRED VISION: 0
SPEECH CHANGE: 0
INSOMNIA: 0
NAUSEA: 1
BLURRED VISION: 0
SORE THROAT: 0
PALPITATIONS: 0
COUGH: 0
BLOOD IN STOOL: 0
MYALGIAS: 0
BACK PAIN: 1
MYALGIAS: 0
TREMORS: 0
SHORTNESS OF BREATH: 0
TINGLING: 0
FEVER: 0
ABDOMINAL PAIN: 0
PALPITATIONS: 0
DIZZINESS: 0
NERVOUS/ANXIOUS: 0
DEPRESSION: 0
DIZZINESS: 0
CHILLS: 0
MYALGIAS: 0
SORE THROAT: 0
MYALGIAS: 0
COUGH: 0
VOMITING: 1
DIZZINESS: 0
NAUSEA: 0
BACK PAIN: 1
SHORTNESS OF BREATH: 0
CHILLS: 0
COUGH: 0
DOUBLE VISION: 0
COUGH: 0
NAUSEA: 1
NAUSEA: 1
HEMOPTYSIS: 0
CHILLS: 0
COUGH: 0
SPUTUM PRODUCTION: 0
FLANK PAIN: 0
FEVER: 0
CHILLS: 0

## 2019-01-01 ASSESSMENT — LIFESTYLE VARIABLES
HOW MANY TIMES IN THE PAST YEAR HAVE YOU HAD 5 OR MORE DRINKS IN A DAY: 0
ON A TYPICAL DAY WHEN YOU DRINK ALCOHOL HOW MANY DRINKS DO YOU HAVE: 0
HAVE YOU EVER FELT YOU SHOULD CUT DOWN ON YOUR DRINKING: NO
TOTAL SCORE: 0
EVER FELT BAD OR GUILTY ABOUT YOUR DRINKING: NO
TOTAL SCORE: 0
TOTAL SCORE: 0
EVER_SMOKED: NEVER
EVER_SMOKED: NEVER
DOES PATIENT WANT TO STOP DRINKING: NO
HAVE PEOPLE ANNOYED YOU BY CRITICIZING YOUR DRINKING: NO
EVER_SMOKED: NEVER
SUBSTANCE_ABUSE: 0
SUBSTANCE_ABUSE: 0
AVERAGE NUMBER OF DAYS PER WEEK YOU HAVE A DRINK CONTAINING ALCOHOL: 0
CONSUMPTION TOTAL: NEGATIVE
EVER HAD A DRINK FIRST THING IN THE MORNING TO STEADY YOUR NERVES TO GET RID OF A HANGOVER: NO
EVER_SMOKED: NEVER
SUBSTANCE_ABUSE: 0
ALCOHOL_USE: NO
SUBSTANCE_ABUSE: 0

## 2019-01-01 ASSESSMENT — COPD QUESTIONNAIRES
DO YOU EVER COUGH UP ANY MUCUS OR PHLEGM?: NO/ONLY WITH OCCASIONAL COLDS OR INFECTIONS
HAVE YOU SMOKED AT LEAST 100 CIGARETTES IN YOUR ENTIRE LIFE: NO/DON'T KNOW
HAVE YOU SMOKED AT LEAST 100 CIGARETTES IN YOUR ENTIRE LIFE: NO/DON'T KNOW
DO YOU EVER COUGH UP ANY MUCUS OR PHLEGM?: NO/ONLY WITH OCCASIONAL COLDS OR INFECTIONS
COPD SCREENING SCORE: 0
DO YOU EVER COUGH UP ANY MUCUS OR PHLEGM?: NO/ONLY WITH OCCASIONAL COLDS OR INFECTIONS
COPD SCREENING SCORE: 0
DURING THE PAST 4 WEEKS HOW MUCH DID YOU FEEL SHORT OF BREATH: NONE/LITTLE OF THE TIME
DURING THE PAST 4 WEEKS HOW MUCH DID YOU FEEL SHORT OF BREATH: NONE/LITTLE OF THE TIME
COPD SCREENING SCORE: 0
HAVE YOU SMOKED AT LEAST 100 CIGARETTES IN YOUR ENTIRE LIFE: NO/DON'T KNOW
DURING THE PAST 4 WEEKS HOW MUCH DID YOU FEEL SHORT OF BREATH: NONE/LITTLE OF THE TIME

## 2019-01-01 ASSESSMENT — PATIENT HEALTH QUESTIONNAIRE - PHQ9
1. LITTLE INTEREST OR PLEASURE IN DOING THINGS: NOT AT ALL
1. LITTLE INTEREST OR PLEASURE IN DOING THINGS: NOT AT ALL
2. FEELING DOWN, DEPRESSED, IRRITABLE, OR HOPELESS: NOT AT ALL
SUM OF ALL RESPONSES TO PHQ9 QUESTIONS 1 AND 2: 0
2. FEELING DOWN, DEPRESSED, IRRITABLE, OR HOPELESS: NOT AT ALL
1. LITTLE INTEREST OR PLEASURE IN DOING THINGS: NOT AT ALL
SUM OF ALL RESPONSES TO PHQ9 QUESTIONS 1 AND 2: 0
SUM OF ALL RESPONSES TO PHQ9 QUESTIONS 1 AND 2: 0
1. LITTLE INTEREST OR PLEASURE IN DOING THINGS: NOT AT ALL
2. FEELING DOWN, DEPRESSED, IRRITABLE, OR HOPELESS: NOT AT ALL
SUM OF ALL RESPONSES TO PHQ9 QUESTIONS 1 AND 2: 0
1. LITTLE INTEREST OR PLEASURE IN DOING THINGS: NOT AT ALL
SUM OF ALL RESPONSES TO PHQ9 QUESTIONS 1 AND 2: 0

## 2019-01-09 ENCOUNTER — APPOINTMENT (OUTPATIENT)
Dept: RADIOLOGY | Facility: MEDICAL CENTER | Age: 29
DRG: 637 | End: 2019-01-09
Attending: INTERNAL MEDICINE
Payer: MEDICAID

## 2019-01-09 ENCOUNTER — APPOINTMENT (OUTPATIENT)
Dept: RADIOLOGY | Facility: MEDICAL CENTER | Age: 29
DRG: 637 | End: 2019-01-09
Attending: EMERGENCY MEDICINE
Payer: MEDICAID

## 2019-01-09 ENCOUNTER — HOSPITAL ENCOUNTER (INPATIENT)
Facility: MEDICAL CENTER | Age: 29
LOS: 6 days | DRG: 637 | End: 2019-01-15
Attending: EMERGENCY MEDICINE | Admitting: INTERNAL MEDICINE
Payer: MEDICAID

## 2019-01-09 DIAGNOSIS — K92.0 HEMATEMESIS WITH NAUSEA: ICD-10-CM

## 2019-01-09 DIAGNOSIS — E10.10 DIABETIC KETOACIDOSIS WITHOUT COMA ASSOCIATED WITH TYPE 1 DIABETES MELLITUS (HCC): ICD-10-CM

## 2019-01-09 PROBLEM — I10 HYPERTENSION: Status: ACTIVE | Noted: 2019-01-09

## 2019-01-09 PROBLEM — E87.1 HYPONATREMIA: Status: ACTIVE | Noted: 2019-01-09

## 2019-01-09 PROBLEM — K92.2 UPPER GI BLEEDING: Status: ACTIVE | Noted: 2019-01-09

## 2019-01-09 PROBLEM — E78.5 DYSLIPIDEMIA: Status: ACTIVE | Noted: 2019-01-09

## 2019-01-09 PROBLEM — E66.9 OBESITY: Status: RESOLVED | Noted: 2018-11-09 | Resolved: 2019-01-09

## 2019-01-09 PROBLEM — R10.13 EPIGASTRIC ABDOMINAL PAIN: Status: ACTIVE | Noted: 2019-01-09

## 2019-01-09 LAB
ALBUMIN SERPL BCP-MCNC: 4.3 G/DL (ref 3.2–4.9)
ALBUMIN/GLOB SERPL: 1.4 G/DL
ALP SERPL-CCNC: 86 U/L (ref 30–99)
ALT SERPL-CCNC: 17 U/L (ref 2–50)
ANION GAP SERPL CALC-SCNC: 15 MMOL/L (ref 0–11.9)
ANION GAP SERPL CALC-SCNC: 17 MMOL/L (ref 0–11.9)
ANION GAP SERPL CALC-SCNC: 19 MMOL/L (ref 0–11.9)
ANION GAP SERPL CALC-SCNC: 25 MMOL/L (ref 0–11.9)
APPEARANCE UR: CLEAR
AST SERPL-CCNC: 17 U/L (ref 12–45)
B-OH-BUTYR SERPL-MCNC: >8 MMOL/L (ref 0.02–0.27)
BACTERIA #/AREA URNS HPF: NEGATIVE /HPF
BASE EXCESS BLDA CALC-SCNC: -22 MMOL/L (ref -4–3)
BASOPHILS # BLD AUTO: 0.5 % (ref 0–1.8)
BASOPHILS # BLD: 0.09 K/UL (ref 0–0.12)
BILIRUB SERPL-MCNC: 0.5 MG/DL (ref 0.1–1.5)
BILIRUB UR QL STRIP.AUTO: NEGATIVE
BODY TEMPERATURE: ABNORMAL CENTIGRADE
BUN SERPL-MCNC: 13 MG/DL (ref 8–22)
BUN SERPL-MCNC: 17 MG/DL (ref 8–22)
BUN SERPL-MCNC: 21 MG/DL (ref 8–22)
BUN SERPL-MCNC: 22 MG/DL (ref 8–22)
CALCIUM SERPL-MCNC: 8.4 MG/DL (ref 8.5–10.5)
CALCIUM SERPL-MCNC: 8.5 MG/DL (ref 8.5–10.5)
CALCIUM SERPL-MCNC: 8.5 MG/DL (ref 8.5–10.5)
CALCIUM SERPL-MCNC: 9.6 MG/DL (ref 8.5–10.5)
CHLORIDE SERPL-SCNC: 103 MMOL/L (ref 96–112)
CHLORIDE SERPL-SCNC: 114 MMOL/L (ref 96–112)
CHLORIDE SERPL-SCNC: 120 MMOL/L (ref 96–112)
CHLORIDE SERPL-SCNC: 121 MMOL/L (ref 96–112)
CO2 SERPL-SCNC: 6 MMOL/L (ref 20–33)
CO2 SERPL-SCNC: <5 MMOL/L (ref 20–33)
COLOR UR: YELLOW
CREAT SERPL-MCNC: 0.71 MG/DL (ref 0.5–1.4)
CREAT SERPL-MCNC: 0.76 MG/DL (ref 0.5–1.4)
CREAT SERPL-MCNC: 0.79 MG/DL (ref 0.5–1.4)
CREAT SERPL-MCNC: 0.84 MG/DL (ref 0.5–1.4)
EKG IMPRESSION: NORMAL
EOSINOPHIL # BLD AUTO: 0.02 K/UL (ref 0–0.51)
EOSINOPHIL NFR BLD: 0.1 % (ref 0–6.9)
EPI CELLS #/AREA URNS HPF: ABNORMAL /HPF
ERYTHROCYTE [DISTWIDTH] IN BLOOD BY AUTOMATED COUNT: 44.4 FL (ref 35.9–50)
EST. AVERAGE GLUCOSE BLD GHB EST-MCNC: 278 MG/DL
GLOBULIN SER CALC-MCNC: 3 G/DL (ref 1.9–3.5)
GLUCOSE BLD-MCNC: 150 MG/DL (ref 65–99)
GLUCOSE BLD-MCNC: 158 MG/DL (ref 65–99)
GLUCOSE BLD-MCNC: 162 MG/DL (ref 65–99)
GLUCOSE BLD-MCNC: 163 MG/DL (ref 65–99)
GLUCOSE BLD-MCNC: 173 MG/DL (ref 65–99)
GLUCOSE BLD-MCNC: 203 MG/DL (ref 65–99)
GLUCOSE BLD-MCNC: 220 MG/DL (ref 65–99)
GLUCOSE BLD-MCNC: 266 MG/DL (ref 65–99)
GLUCOSE BLD-MCNC: 323 MG/DL (ref 65–99)
GLUCOSE BLD-MCNC: 376 MG/DL (ref 65–99)
GLUCOSE BLD-MCNC: 383 MG/DL (ref 65–99)
GLUCOSE BLD-MCNC: 428 MG/DL (ref 65–99)
GLUCOSE SERPL-MCNC: 197 MG/DL (ref 65–99)
GLUCOSE SERPL-MCNC: 251 MG/DL (ref 65–99)
GLUCOSE SERPL-MCNC: 402 MG/DL (ref 65–99)
GLUCOSE SERPL-MCNC: 433 MG/DL (ref 65–99)
GLUCOSE UR STRIP.AUTO-MCNC: >=1000 MG/DL
HBA1C MFR BLD: 11.3 % (ref 0–5.6)
HCG SERPL QL: NEGATIVE
HCO3 BLDA-SCNC: 4 MMOL/L (ref 17–25)
HCT VFR BLD AUTO: 45 % (ref 37–47)
HGB BLD-MCNC: 14.9 G/DL (ref 12–16)
HYALINE CASTS #/AREA URNS LPF: ABNORMAL /LPF
IMM GRANULOCYTES # BLD AUTO: 0.27 K/UL (ref 0–0.11)
IMM GRANULOCYTES NFR BLD AUTO: 1.5 % (ref 0–0.9)
KETONES UR STRIP.AUTO-MCNC: >=160 MG/DL
LEUKOCYTE ESTERASE UR QL STRIP.AUTO: NEGATIVE
LYMPHOCYTES # BLD AUTO: 2.19 K/UL (ref 1–4.8)
LYMPHOCYTES NFR BLD: 11.9 % (ref 22–41)
MAGNESIUM SERPL-MCNC: 1.9 MG/DL (ref 1.5–2.5)
MAGNESIUM SERPL-MCNC: 2.5 MG/DL (ref 1.5–2.5)
MCH RBC QN AUTO: 29.8 PG (ref 27–33)
MCHC RBC AUTO-ENTMCNC: 33.1 G/DL (ref 33.6–35)
MCV RBC AUTO: 90 FL (ref 81.4–97.8)
MICRO URNS: ABNORMAL
MONOCYTES # BLD AUTO: 0.45 K/UL (ref 0–0.85)
MONOCYTES NFR BLD AUTO: 2.4 % (ref 0–13.4)
NEUTROPHILS # BLD AUTO: 15.43 K/UL (ref 2–7.15)
NEUTROPHILS NFR BLD: 83.6 % (ref 44–72)
NITRITE UR QL STRIP.AUTO: NEGATIVE
NRBC # BLD AUTO: 0 K/UL
NRBC BLD-RTO: 0 /100 WBC
PCO2 BLDA: 11.9 MMHG (ref 26–37)
PH BLDA: 7.16 [PH] (ref 7.4–7.5)
PH UR STRIP.AUTO: 5 [PH]
PHOSPHATE SERPL-MCNC: 3.1 MG/DL (ref 2.5–4.5)
PHOSPHATE SERPL-MCNC: 3.9 MG/DL (ref 2.5–4.5)
PLATELET # BLD AUTO: 267 K/UL (ref 164–446)
PMV BLD AUTO: 10.7 FL (ref 9–12.9)
PO2 BLDA: 218.2 MMHG (ref 64–87)
POTASSIUM SERPL-SCNC: 3.9 MMOL/L (ref 3.6–5.5)
POTASSIUM SERPL-SCNC: 4.5 MMOL/L (ref 3.6–5.5)
POTASSIUM SERPL-SCNC: 4.7 MMOL/L (ref 3.6–5.5)
POTASSIUM SERPL-SCNC: 5.4 MMOL/L (ref 3.6–5.5)
PROT SERPL-MCNC: 7.3 G/DL (ref 6–8.2)
PROT UR QL STRIP: 100 MG/DL
RBC # BLD AUTO: 5 M/UL (ref 4.2–5.4)
RBC # URNS HPF: ABNORMAL /HPF
RBC UR QL AUTO: NEGATIVE
SAO2 % BLDA: 98.3 % (ref 93–99)
SODIUM SERPL-SCNC: 133 MMOL/L (ref 135–145)
SODIUM SERPL-SCNC: 138 MMOL/L (ref 135–145)
SODIUM SERPL-SCNC: 142 MMOL/L (ref 135–145)
SODIUM SERPL-SCNC: 142 MMOL/L (ref 135–145)
SP GR UR STRIP.AUTO: 1.03
UROBILINOGEN UR STRIP.AUTO-MCNC: 0.2 MG/DL
WBC # BLD AUTO: 18.5 K/UL (ref 4.8–10.8)
WBC #/AREA URNS HPF: ABNORMAL /HPF

## 2019-01-09 PROCEDURE — 82803 BLOOD GASES ANY COMBINATION: CPT

## 2019-01-09 PROCEDURE — C9113 INJ PANTOPRAZOLE SODIUM, VIA: HCPCS | Performed by: INTERNAL MEDICINE

## 2019-01-09 PROCEDURE — 36556 INSERT NON-TUNNEL CV CATH: CPT | Mod: RT | Performed by: INTERNAL MEDICINE

## 2019-01-09 PROCEDURE — 84100 ASSAY OF PHOSPHORUS: CPT

## 2019-01-09 PROCEDURE — 700111 HCHG RX REV CODE 636 W/ 250 OVERRIDE (IP): Performed by: INTERNAL MEDICINE

## 2019-01-09 PROCEDURE — 02HV33Z INSERTION OF INFUSION DEVICE INTO SUPERIOR VENA CAVA, PERCUTANEOUS APPROACH: ICD-10-PCS | Performed by: INTERNAL MEDICINE

## 2019-01-09 PROCEDURE — 96365 THER/PROPH/DIAG IV INF INIT: CPT

## 2019-01-09 PROCEDURE — 36415 COLL VENOUS BLD VENIPUNCTURE: CPT

## 2019-01-09 PROCEDURE — 93005 ELECTROCARDIOGRAM TRACING: CPT | Performed by: INTERNAL MEDICINE

## 2019-01-09 PROCEDURE — 81001 URINALYSIS AUTO W/SCOPE: CPT

## 2019-01-09 PROCEDURE — 700105 HCHG RX REV CODE 258: Performed by: EMERGENCY MEDICINE

## 2019-01-09 PROCEDURE — 83036 HEMOGLOBIN GLYCOSYLATED A1C: CPT

## 2019-01-09 PROCEDURE — 700105 HCHG RX REV CODE 258: Performed by: INTERNAL MEDICINE

## 2019-01-09 PROCEDURE — 700102 HCHG RX REV CODE 250 W/ 637 OVERRIDE(OP): Performed by: INTERNAL MEDICINE

## 2019-01-09 PROCEDURE — 74018 RADEX ABDOMEN 1 VIEW: CPT

## 2019-01-09 PROCEDURE — 94760 N-INVAS EAR/PLS OXIMETRY 1: CPT

## 2019-01-09 PROCEDURE — 80053 COMPREHEN METABOLIC PANEL: CPT

## 2019-01-09 PROCEDURE — 84703 CHORIONIC GONADOTROPIN ASSAY: CPT

## 2019-01-09 PROCEDURE — C1751 CATH, INF, PER/CENT/MIDLINE: HCPCS | Performed by: INTERNAL MEDICINE

## 2019-01-09 PROCEDURE — A9270 NON-COVERED ITEM OR SERVICE: HCPCS | Performed by: INTERNAL MEDICINE

## 2019-01-09 PROCEDURE — B548ZZA ULTRASONOGRAPHY OF SUPERIOR VENA CAVA, GUIDANCE: ICD-10-PCS | Performed by: INTERNAL MEDICINE

## 2019-01-09 PROCEDURE — 82010 KETONE BODYS QUAN: CPT

## 2019-01-09 PROCEDURE — 71045 X-RAY EXAM CHEST 1 VIEW: CPT

## 2019-01-09 PROCEDURE — 96376 TX/PRO/DX INJ SAME DRUG ADON: CPT

## 2019-01-09 PROCEDURE — C9113 INJ PANTOPRAZOLE SODIUM, VIA: HCPCS | Performed by: EMERGENCY MEDICINE

## 2019-01-09 PROCEDURE — 99223 1ST HOSP IP/OBS HIGH 75: CPT | Performed by: INTERNAL MEDICINE

## 2019-01-09 PROCEDURE — C1751 CATH, INF, PER/CENT/MIDLINE: HCPCS

## 2019-01-09 PROCEDURE — 85025 COMPLETE CBC W/AUTO DIFF WBC: CPT

## 2019-01-09 PROCEDURE — 36556 INSERT NON-TUNNEL CV CATH: CPT

## 2019-01-09 PROCEDURE — 96368 THER/DIAG CONCURRENT INF: CPT

## 2019-01-09 PROCEDURE — 96375 TX/PRO/DX INJ NEW DRUG ADDON: CPT

## 2019-01-09 PROCEDURE — 700111 HCHG RX REV CODE 636 W/ 250 OVERRIDE (IP): Performed by: EMERGENCY MEDICINE

## 2019-01-09 PROCEDURE — 96366 THER/PROPH/DIAG IV INF ADDON: CPT

## 2019-01-09 PROCEDURE — 83735 ASSAY OF MAGNESIUM: CPT

## 2019-01-09 PROCEDURE — 99291 CRITICAL CARE FIRST HOUR: CPT

## 2019-01-09 PROCEDURE — 93010 ELECTROCARDIOGRAM REPORT: CPT | Performed by: INTERNAL MEDICINE

## 2019-01-09 PROCEDURE — 770022 HCHG ROOM/CARE - ICU (200)

## 2019-01-09 PROCEDURE — 96361 HYDRATE IV INFUSION ADD-ON: CPT

## 2019-01-09 PROCEDURE — 80048 BASIC METABOLIC PNL TOTAL CA: CPT

## 2019-01-09 PROCEDURE — 99291 CRITICAL CARE FIRST HOUR: CPT | Mod: 25 | Performed by: INTERNAL MEDICINE

## 2019-01-09 PROCEDURE — 82962 GLUCOSE BLOOD TEST: CPT

## 2019-01-09 PROCEDURE — 96367 TX/PROPH/DG ADDL SEQ IV INF: CPT

## 2019-01-09 RX ORDER — ACETAMINOPHEN 325 MG/1
650 TABLET ORAL EVERY 6 HOURS PRN
Status: DISCONTINUED | OUTPATIENT
Start: 2019-01-09 | End: 2019-01-15 | Stop reason: HOSPADM

## 2019-01-09 RX ORDER — POTASSIUM CHLORIDE 14.9 MG/ML
20 INJECTION INTRAVENOUS ONCE
Status: COMPLETED | OUTPATIENT
Start: 2019-01-09 | End: 2019-01-10

## 2019-01-09 RX ORDER — SODIUM CHLORIDE 9 MG/ML
1000 INJECTION, SOLUTION INTRAVENOUS ONCE
Status: COMPLETED | OUTPATIENT
Start: 2019-01-09 | End: 2019-01-09

## 2019-01-09 RX ORDER — POLYETHYLENE GLYCOL 3350 17 G/17G
1 POWDER, FOR SOLUTION ORAL
Status: DISCONTINUED | OUTPATIENT
Start: 2019-01-09 | End: 2019-01-15 | Stop reason: HOSPADM

## 2019-01-09 RX ORDER — DEXTROSE AND SODIUM CHLORIDE 10; .45 G/100ML; G/100ML
INJECTION, SOLUTION INTRAVENOUS CONTINUOUS
Status: DISCONTINUED | OUTPATIENT
Start: 2019-01-09 | End: 2019-01-09

## 2019-01-09 RX ORDER — ONDANSETRON 2 MG/ML
4 INJECTION INTRAMUSCULAR; INTRAVENOUS ONCE
Status: COMPLETED | OUTPATIENT
Start: 2019-01-09 | End: 2019-01-09

## 2019-01-09 RX ORDER — HYDROMORPHONE HYDROCHLORIDE 1 MG/ML
1 INJECTION, SOLUTION INTRAMUSCULAR; INTRAVENOUS; SUBCUTANEOUS ONCE
Status: COMPLETED | OUTPATIENT
Start: 2019-01-09 | End: 2019-01-09

## 2019-01-09 RX ORDER — ONDANSETRON 4 MG/1
4 TABLET, ORALLY DISINTEGRATING ORAL EVERY 4 HOURS PRN
Status: DISCONTINUED | OUTPATIENT
Start: 2019-01-09 | End: 2019-01-15 | Stop reason: HOSPADM

## 2019-01-09 RX ORDER — ONDANSETRON 2 MG/ML
4 INJECTION INTRAMUSCULAR; INTRAVENOUS EVERY 4 HOURS PRN
Status: DISCONTINUED | OUTPATIENT
Start: 2019-01-09 | End: 2019-01-15 | Stop reason: HOSPADM

## 2019-01-09 RX ORDER — MAGNESIUM SULFATE HEPTAHYDRATE 40 MG/ML
4 INJECTION, SOLUTION INTRAVENOUS
Status: COMPLETED | OUTPATIENT
Start: 2019-01-09 | End: 2019-01-09

## 2019-01-09 RX ORDER — POTASSIUM CHLORIDE 7.45 MG/ML
10 INJECTION INTRAVENOUS ONCE
Status: COMPLETED | OUTPATIENT
Start: 2019-01-09 | End: 2019-01-09

## 2019-01-09 RX ORDER — SUCRALFATE ORAL 1 G/10ML
1 SUSPENSION ORAL ONCE
Status: COMPLETED | OUTPATIENT
Start: 2019-01-09 | End: 2019-01-09

## 2019-01-09 RX ORDER — SODIUM CHLORIDE 9 MG/ML
2000 INJECTION, SOLUTION INTRAVENOUS ONCE
Status: DISCONTINUED | OUTPATIENT
Start: 2019-01-09 | End: 2019-01-09

## 2019-01-09 RX ORDER — BISACODYL 10 MG
10 SUPPOSITORY, RECTAL RECTAL
Status: DISCONTINUED | OUTPATIENT
Start: 2019-01-09 | End: 2019-01-15 | Stop reason: HOSPADM

## 2019-01-09 RX ORDER — AMOXICILLIN 250 MG
2 CAPSULE ORAL 2 TIMES DAILY
Status: DISCONTINUED | OUTPATIENT
Start: 2019-01-09 | End: 2019-01-15 | Stop reason: HOSPADM

## 2019-01-09 RX ORDER — DIPHENHYDRAMINE HYDROCHLORIDE 50 MG/ML
12.5 INJECTION INTRAMUSCULAR; INTRAVENOUS ONCE
Status: DISCONTINUED | OUTPATIENT
Start: 2019-01-09 | End: 2019-01-09

## 2019-01-09 RX ORDER — DEXTROSE MONOHYDRATE 50 MG/ML
INJECTION, SOLUTION INTRAVENOUS CONTINUOUS
Status: DISCONTINUED | OUTPATIENT
Start: 2019-01-09 | End: 2019-01-10

## 2019-01-09 RX ORDER — SODIUM CHLORIDE, SODIUM LACTATE, POTASSIUM CHLORIDE, AND CALCIUM CHLORIDE .6; .31; .03; .02 G/100ML; G/100ML; G/100ML; G/100ML
2000 INJECTION, SOLUTION INTRAVENOUS ONCE
Status: DISCONTINUED | OUTPATIENT
Start: 2019-01-09 | End: 2019-01-09

## 2019-01-09 RX ORDER — DIPHENHYDRAMINE HYDROCHLORIDE 50 MG/ML
12.5 INJECTION INTRAMUSCULAR; INTRAVENOUS ONCE
Status: COMPLETED | OUTPATIENT
Start: 2019-01-09 | End: 2019-01-09

## 2019-01-09 RX ORDER — HYDROMORPHONE HYDROCHLORIDE 1 MG/ML
0.5 INJECTION, SOLUTION INTRAMUSCULAR; INTRAVENOUS; SUBCUTANEOUS
Status: DISCONTINUED | OUTPATIENT
Start: 2019-01-09 | End: 2019-01-13

## 2019-01-09 RX ORDER — METOCLOPRAMIDE HYDROCHLORIDE 5 MG/ML
10 INJECTION INTRAMUSCULAR; INTRAVENOUS EVERY 6 HOURS
Status: DISCONTINUED | OUTPATIENT
Start: 2019-01-09 | End: 2019-01-14

## 2019-01-09 RX ORDER — DEXTROSE AND SODIUM CHLORIDE 5; .9 G/100ML; G/100ML
INJECTION, SOLUTION INTRAVENOUS CONTINUOUS
Status: DISCONTINUED | OUTPATIENT
Start: 2019-01-09 | End: 2019-01-09

## 2019-01-09 RX ORDER — SODIUM CHLORIDE, SODIUM LACTATE, POTASSIUM CHLORIDE, AND CALCIUM CHLORIDE .6; .31; .03; .02 G/100ML; G/100ML; G/100ML; G/100ML
1000 INJECTION, SOLUTION INTRAVENOUS ONCE
Status: COMPLETED | OUTPATIENT
Start: 2019-01-09 | End: 2019-01-09

## 2019-01-09 RX ORDER — MAGNESIUM SULFATE HEPTAHYDRATE 40 MG/ML
2 INJECTION, SOLUTION INTRAVENOUS
Status: COMPLETED | OUTPATIENT
Start: 2019-01-09 | End: 2019-01-09

## 2019-01-09 RX ORDER — DEXTROSE MONOHYDRATE 100 MG/ML
INJECTION, SOLUTION INTRAVENOUS CONTINUOUS
Status: DISCONTINUED | OUTPATIENT
Start: 2019-01-09 | End: 2019-01-12

## 2019-01-09 RX ORDER — SODIUM CHLORIDE, SODIUM LACTATE, POTASSIUM CHLORIDE, CALCIUM CHLORIDE 600; 310; 30; 20 MG/100ML; MG/100ML; MG/100ML; MG/100ML
INJECTION, SOLUTION INTRAVENOUS CONTINUOUS
Status: DISCONTINUED | OUTPATIENT
Start: 2019-01-09 | End: 2019-01-10

## 2019-01-09 RX ADMIN — SODIUM CHLORIDE 1000 ML: 9 INJECTION, SOLUTION INTRAVENOUS at 09:20

## 2019-01-09 RX ADMIN — SODIUM CHLORIDE, POTASSIUM CHLORIDE, SODIUM LACTATE AND CALCIUM CHLORIDE: 600; 310; 30; 20 INJECTION, SOLUTION INTRAVENOUS at 14:06

## 2019-01-09 RX ADMIN — SODIUM CHLORIDE 8 MG/HR: 9 INJECTION, SOLUTION INTRAVENOUS at 09:21

## 2019-01-09 RX ADMIN — MAGNESIUM SULFATE IN WATER 2 G: 40 INJECTION, SOLUTION INTRAVENOUS at 14:18

## 2019-01-09 RX ADMIN — DEXTROSE MONOHYDRATE: 100 INJECTION, SOLUTION INTRAVENOUS at 23:14

## 2019-01-09 RX ADMIN — ONDANSETRON 4 MG: 4 TABLET, ORALLY DISINTEGRATING ORAL at 16:10

## 2019-01-09 RX ADMIN — HYDROMORPHONE HYDROCHLORIDE 0.5 MG: 1 INJECTION, SOLUTION INTRAMUSCULAR; INTRAVENOUS; SUBCUTANEOUS at 14:18

## 2019-01-09 RX ADMIN — DIPHENHYDRAMINE HYDROCHLORIDE 12.5 MG: 50 INJECTION, SOLUTION INTRAMUSCULAR; INTRAVENOUS at 16:57

## 2019-01-09 RX ADMIN — SODIUM CHLORIDE, POTASSIUM CHLORIDE, SODIUM LACTATE AND CALCIUM CHLORIDE 1000 ML: 600; 310; 30; 20 INJECTION, SOLUTION INTRAVENOUS at 18:20

## 2019-01-09 RX ADMIN — SODIUM CHLORIDE 80 MG: 9 INJECTION, SOLUTION INTRAVENOUS at 09:21

## 2019-01-09 RX ADMIN — STANDARDIZED SENNA CONCENTRATE AND DOCUSATE SODIUM 2 TABLET: 8.6; 5 TABLET, FILM COATED ORAL at 18:00

## 2019-01-09 RX ADMIN — DIPHENHYDRAMINE HYDROCHLORIDE 12.5 MG: 50 INJECTION, SOLUTION INTRAMUSCULAR; INTRAVENOUS at 18:00

## 2019-01-09 RX ADMIN — SODIUM CHLORIDE 1000 ML: 9 INJECTION, SOLUTION INTRAVENOUS at 07:38

## 2019-01-09 RX ADMIN — HYDROMORPHONE HYDROCHLORIDE 0.5 MG: 1 INJECTION, SOLUTION INTRAMUSCULAR; INTRAVENOUS; SUBCUTANEOUS at 20:10

## 2019-01-09 RX ADMIN — ONDANSETRON 4 MG: 2 INJECTION INTRAMUSCULAR; INTRAVENOUS at 11:32

## 2019-01-09 RX ADMIN — HYDROMORPHONE HYDROCHLORIDE 1 MG: 1 INJECTION, SOLUTION INTRAMUSCULAR; INTRAVENOUS; SUBCUTANEOUS at 08:21

## 2019-01-09 RX ADMIN — SODIUM CHLORIDE 4 UNITS/HR: 9 INJECTION, SOLUTION INTRAVENOUS at 13:01

## 2019-01-09 RX ADMIN — DEXTROSE MONOHYDRATE: 50 INJECTION, SOLUTION INTRAVENOUS at 22:22

## 2019-01-09 RX ADMIN — METOCLOPRAMIDE 10 MG: 5 INJECTION, SOLUTION INTRAMUSCULAR; INTRAVENOUS at 23:01

## 2019-01-09 RX ADMIN — ONDANSETRON 4 MG: 2 INJECTION INTRAMUSCULAR; INTRAVENOUS at 08:10

## 2019-01-09 RX ADMIN — ONDANSETRON HYDROCHLORIDE 4 MG: 2 INJECTION INTRAMUSCULAR; INTRAVENOUS at 14:05

## 2019-01-09 RX ADMIN — HYDROMORPHONE HYDROCHLORIDE 0.5 MG: 1 INJECTION, SOLUTION INTRAMUSCULAR; INTRAVENOUS; SUBCUTANEOUS at 17:11

## 2019-01-09 RX ADMIN — DEXTROSE AND SODIUM CHLORIDE: 5; 900 INJECTION, SOLUTION INTRAVENOUS at 16:55

## 2019-01-09 RX ADMIN — POTASSIUM CHLORIDE 10 MEQ: 7.46 INJECTION, SOLUTION INTRAVENOUS at 18:20

## 2019-01-09 RX ADMIN — POTASSIUM CHLORIDE 10 MEQ: 10 INJECTION, SOLUTION INTRAVENOUS at 13:03

## 2019-01-09 RX ADMIN — SUCRALFATE 1 G: 1 SUSPENSION ORAL at 10:45

## 2019-01-09 RX ADMIN — SODIUM CHLORIDE, POTASSIUM CHLORIDE, SODIUM LACTATE AND CALCIUM CHLORIDE 1000 ML: 600; 310; 30; 20 INJECTION, SOLUTION INTRAVENOUS at 13:02

## 2019-01-09 RX ADMIN — SODIUM CHLORIDE 8 MG/HR: 9 INJECTION, SOLUTION INTRAVENOUS at 20:05

## 2019-01-09 RX ADMIN — SODIUM CHLORIDE 1000 ML: 9 INJECTION, SOLUTION INTRAVENOUS at 11:34

## 2019-01-09 RX ADMIN — METOCLOPRAMIDE 10 MG: 5 INJECTION, SOLUTION INTRAMUSCULAR; INTRAVENOUS at 11:32

## 2019-01-09 RX ADMIN — METOCLOPRAMIDE 10 MG: 5 INJECTION, SOLUTION INTRAMUSCULAR; INTRAVENOUS at 16:56

## 2019-01-09 RX ADMIN — POTASSIUM CHLORIDE 20 MEQ: 14.9 INJECTION, SOLUTION INTRAVENOUS at 22:10

## 2019-01-09 RX ADMIN — HYDROMORPHONE HYDROCHLORIDE 0.5 MG: 1 INJECTION, SOLUTION INTRAMUSCULAR; INTRAVENOUS; SUBCUTANEOUS at 11:32

## 2019-01-09 RX ADMIN — ONDANSETRON HYDROCHLORIDE 4 MG: 2 INJECTION INTRAMUSCULAR; INTRAVENOUS at 20:10

## 2019-01-09 ASSESSMENT — ENCOUNTER SYMPTOMS
CLAUDICATION: 0
DOUBLE VISION: 0
FLANK PAIN: 1
SPEECH CHANGE: 0
NAUSEA: 0
PND: 0
BLURRED VISION: 0
SENSORY CHANGE: 0
WEAKNESS: 1
SEIZURES: 0
SPUTUM PRODUCTION: 0
VOMITING: 0
VOMITING: 1
SINUS PAIN: 0
PALPITATIONS: 0
LOSS OF CONSCIOUSNESS: 0
ACTIVITY CHANGE: 0
NECK PAIN: 0
COUGH: 1
FLANK PAIN: 0
HEADACHES: 0
WEAKNESS: 0
TREMORS: 0
BLOOD IN STOOL: 0
FALLS: 0
ABDOMINAL PAIN: 1
JOINT SWELLING: 0
NAUSEA: 1
DIARRHEA: 0
COUGH: 0
FOCAL WEAKNESS: 0
CONSTIPATION: 0
CHILLS: 0
APPETITE CHANGE: 0
WHEEZING: 0
HEMOPTYSIS: 0
HEARTBURN: 1
FEVER: 0
BACK PAIN: 0
MYALGIAS: 0
ABDOMINAL PAIN: 0
SHORTNESS OF BREATH: 1
DIAPHORESIS: 1
ORTHOPNEA: 0
ARTHRALGIAS: 0
FATIGUE: 1
AGITATION: 0
NERVOUS/ANXIOUS: 0
WEIGHT LOSS: 1
LIGHT-HEADEDNESS: 0
SORE THROAT: 0
DEPRESSION: 0
DIZZINESS: 0
STRIDOR: 0

## 2019-01-09 ASSESSMENT — COPD QUESTIONNAIRES
COPD SCREENING SCORE: 0
DURING THE PAST 4 WEEKS HOW MUCH DID YOU FEEL SHORT OF BREATH: NONE/LITTLE OF THE TIME
DO YOU EVER COUGH UP ANY MUCUS OR PHLEGM?: NO/ONLY WITH OCCASIONAL COLDS OR INFECTIONS
HAVE YOU SMOKED AT LEAST 100 CIGARETTES IN YOUR ENTIRE LIFE: NO/DON'T KNOW

## 2019-01-09 ASSESSMENT — LIFESTYLE VARIABLES
EVER_SMOKED: NEVER
DO YOU DRINK ALCOHOL: NO
SUBSTANCE_ABUSE: 0

## 2019-01-09 ASSESSMENT — PAIN SCALES - GENERAL
PAINLEVEL_OUTOF10: 8
PAINLEVEL_OUTOF10: 4
PAINLEVEL_OUTOF10: 9
PAINLEVEL_OUTOF10: 5
PAINLEVEL_OUTOF10: 9
PAINLEVEL_OUTOF10: 9

## 2019-01-09 NOTE — H&P
Hospital Medicine History & Physical Note    Date of Service  1/9/2019    Primary Care Physician  Navi Huber M.D.    Consultants  Pulmonology / Critical Care - Discussed with ERP to be consulted by ERP     Code Status  FULL CODE     Chief Complaint  Nausea, Vomiting - High blood sugars, Coffee ground emesis, Unable to keep anything down    History of Presenting Illness  28 y.o. female who presented 1/9/2019 with Above complaints.     Patient with underlying history of type 1 diabetes mellitus with recurrent hospitalization and ICU admissions for diabetic ketoacidosis.  Her diabetes has been complicated by neuropathy, retinopathy, gastroparesis.  7 hospitalizations last year.  She now presents to the hospital with nausea and vomiting and unable to keep anything down.  She reports that her symptoms started yesterday morning she has not used any insulin since yesterday because she was not eating anything.  She reports that her nausea and vomiting progressed and she had an episode of coffee-ground emesis at home and subsequently here in the emergency department.  As her nausea and vomiting worsened she started having chest pain and epigastric pain.  She reports that both of the pain are similar, mostly in the epigastric area.  It is a sharp pain, 6 out of 10 in intensity, nonradiating, no improving factors, worsening with ongoing nausea and vomiting, no associated fever or chills but complains of diaphoresis and some shortness of breath with ongoing dry heaves.  No headaches otherwise.  No blood in bowel movements or melena.  No genitourinary complaints otherwise.  Resented to the emergency department knowing that she was in diabetic ketoacidosis for further evaluation and management.  Hospital medicine was consulted for admission needs.  I discussed with Dr. Torres from emergency department to consult pulmonary critical care for ICU consultation.    Review of Systems  Review of Systems   Constitutional:  Positive for diaphoresis, malaise/fatigue and weight loss. Negative for chills and fever.   HENT: Negative for congestion, ear discharge, ear pain, hearing loss, nosebleeds, sinus pain, sore throat and tinnitus.    Eyes: Negative for blurred vision and double vision.   Respiratory: Positive for cough and shortness of breath. Negative for hemoptysis, sputum production and stridor.    Cardiovascular: Positive for chest pain. Negative for palpitations, orthopnea, claudication, leg swelling and PND.   Gastrointestinal: Positive for abdominal pain, heartburn, nausea and vomiting. Negative for blood in stool, constipation, diarrhea and melena.   Genitourinary: Negative for dysuria, flank pain, frequency, hematuria and urgency.   Musculoskeletal: Negative for back pain, falls, joint pain, myalgias and neck pain.   Skin: Negative for itching and rash.   Neurological: Positive for weakness. Negative for dizziness, tremors, sensory change, speech change, focal weakness, seizures, loss of consciousness and headaches.   Psychiatric/Behavioral: Negative for depression, substance abuse and suicidal ideas.       Past Medical History   has a past medical history of Backpain; Bronchitis; Diabetes type 1, controlled ([facility]); DKA (diabetic ketoacidoses) ([facility]); Fall; Gastroparesis; Kidney infection; Pneumonia; and UTI (urinary tract infection).    Surgical History   has a past surgical history that includes gastroscopy-endo (9/18/2014); gastroscopy with biopsy (9/18/2014); other; submandible abscess incision and drainage (Left, 11/8/2017); dental extraction(s) (11/8/2017); and gastroscopy-endo (N/A, 6/9/2018).     Family History  family history includes Cancer in her unknown relative; Hypertension in her maternal grandfather.     Social History   reports that she has never smoked. She has never used smokeless tobacco. She reports that she does not drink alcohol or use drugs.    Allergies  Allergies   Allergen Reactions   • Pcn  [Penicillins] Shortness of Breath and Swelling     Per patient's Mom, patient tolerates Keflex   • Lisinopril Unspecified     Per historical: Reported pedal swelling. No facial/angioedema or rash nor respiratory symptoms.    • Promethazine Vomiting       Medications  Prior to Admission Medications   Prescriptions Last Dose Informant Patient Reported? Taking?   Cholecalciferol 2000 UNIT Cap 1/8/2019 at Unknown time Family Member Yes No   Sig: Take 2,000 Units by mouth every day.   Insulin Glargine (BASAGLAR KWIKPEN) 100 UNIT/ML Solution Pen-injector 1/8/2019 at Unknown time Family Member Yes No   Sig: Inject 32 Units as instructed 2 Times a Day.   Omega-3 Fatty Acids (OMEGA 3 PO) 1/8/2019 at Unknown time Family Member Yes No   Sig: Take 1 Dose by mouth every day. Unknown OTC Strength   atorvastatin (LIPITOR) 20 MG Tab 1/8/2019 at Unknown time Family Member No No   Sig: Take 1 Tab by mouth every day.   ferrous sulfate 325 (65 Fe) MG tablet 1/8/2019 at Unknown time Family Member Yes No   Sig: Take 325 mg by mouth every day.   insulin glulisine (APIDRA) 100 UNIT/ML Solution Pen-injector injection 1/8/2019 at Unknown time Family Member Yes No   Sig: Inject 2-20 Units as instructed 3 times a day, with meals. FSBS base 150  For every 50 increase in blood sugar insulin doubles  200 = 2 units  250 = 4 units  300 = 8 units  350 =  16 units  400 = 32 units    losartan (COZAAR) 50 MG Tab 1/8/2019 at Unknown time  No No   Sig: Take 1 Tab by mouth every day.   metoclopramide (REGLAN) 10 MG/10ML Solution 1/8/2019 at Unknown time Family Member Yes No   Sig: Take 10 mg by mouth 3 times a day before meals.   omeprazole (PRILOSEC) 20 MG delayed-release capsule 1/8/2019 at Unknown time  No No   Sig: Take 1 Cap by mouth every day.   ondansetron (ZOFRAN ODT) 4 MG TABLET DISPERSIBLE 1/8/2019 at Unknown time  No No   Sig: Take 1 Tab by mouth every four hours as needed for Nausea (give PO if no IV route available).       Facility-Administered Medications: None       Physical Exam  Temp:  [36.2 °C (97.2 °F)] 36.2 °C (97.2 °F)  Pulse:  [125-145] 125  Resp:  [18-31] 31  BP: (115)/(78) 115/78    Physical Exam   Constitutional: She is oriented to person, place, and time. She appears well-developed and well-nourished. No distress.   Body mass index is 28.54 kg/m².   HENT:   Head: Normocephalic.   Mouth/Throat: No oropharyngeal exudate.   Dry mucous membranes   Eyes: Pupils are equal, round, and reactive to light. Conjunctivae are normal. No scleral icterus.   Neck: Normal range of motion. No JVD present.   Cardiovascular: Regular rhythm and normal heart sounds.  Exam reveals no gallop and no friction rub.    No murmur heard.  Tachcyardic   Pulmonary/Chest: Breath sounds normal. No stridor. No respiratory distress. She has no wheezes. She has no rales.   Abdominal: Soft. Bowel sounds are normal. She exhibits no distension. There is no tenderness. There is no rebound and no guarding.   Musculoskeletal: Normal range of motion. She exhibits no edema, tenderness or deformity.   Neurological: She is alert and oriented to person, place, and time. No cranial nerve deficit.   Skin: Skin is warm and dry. She is not diaphoretic.   Psychiatric: She has a normal mood and affect. Her behavior is normal. Judgment and thought content normal.       Laboratory:  Recent Labs      01/09/19   0918   WBC  18.5*   RBC  5.00   HEMOGLOBIN  14.9   HEMATOCRIT  45.0   MCV  90.0   MCH  29.8   MCHC  33.1*   RDW  44.4   PLATELETCT  267   MPV  10.7     Recent Labs      01/09/19   0729   SODIUM  133*   POTASSIUM  4.5   CHLORIDE  103   CO2  <5*   GLUCOSE  433*   BUN  22   CREATININE  0.79   CALCIUM  9.6     Recent Labs      01/09/19   0729   ALTSGPT  17   ASTSGOT  17   ALKPHOSPHAT  86   TBILIRUBIN  0.5   GLUCOSE  433*                 No results for input(s): TROPONINI in the last 72 hours.    Urinalysis:    No results found     Imaging:  No orders to display          Assessment/Plan:  I anticipate this patient is appropriate for observation status at this time.    Diabetic ketoacidosis (HCC)- (present on admission)   Assessment & Plan    Admit to ICU  NPO   IVF bolus (received 2L NS, to receive further 1L NS bolus)   DKA protocol ordered  4 units IV insulin bolus if BS > 350 (Pharmacist to hold if low, Discussed with pharmacist)   Subsequently Insulin gtt 0.05 units / hour  Close monitoring of BMP / Electrolytes and blood sugars   IVF hydration per DKA protocol ordered  Close clinical monitoring to continue      Epigastric abdominal pain- (present on admission)   Assessment & Plan    2/2 intractable vomiting / gastroparesis / DKA related   Plan as above in N/V  GI consultation if persistent N/V and drop in H&H / hematemesis evident   Check KUB / CXRAY   IV dilaudid for pain control at this time      Intractable nausea and vomiting- (present on admission)   Assessment & Plan    Appears related to DKA / diabetic gastroparesis  C/O coffee ground emesis  IV protonix 80 mg followed by 8 mg / hour   If hematemesis recurrent, GI consultation  X 1 Zofran / IV Reglan scheduled   GI cocktail / Sucralfate x 1   Check KUB / CXRAY   NPO for now  Check EKG given use of reglan - to ensure no QTc prolongation      Hyponatremia- (present on admission)   Assessment & Plan    Pseudohyponatremia / Hypovolemic  Continue medical management and monitor      Essential hypertension- (present on admission)   Assessment & Plan    Will resume PO regimen when clinically better     Dyslipidemia- (present on admission)   Assessment & Plan    Resume oral regimen once able to tolerate     Leukemoid reaction- (present on admission)   Assessment & Plan    Reactive from DKA  Monitor - no infectious concerns apparent at this time  UA pending  Will check CXRAY          VTE prophylaxis: SCDs for now, SC heparin or lovenox if no further evidence of UGIB

## 2019-01-09 NOTE — CONSULTS
Critical Care Consultation    Date of consult: 1/9/2019    Referring Physician  Jeb Torres M.D.    Reason for Consultation  DKA, coffee ground emesis    History of Presenting Illness  28 y.o. female with hx of DM type 1 c/b neuropathy, retinopathy, and gastroparesis, hx of recurrent hospitalization for DKA recently in 12/2018 who now came back to ED after been having nausea and vomiting. Pt noted coffee ground emesis. At ED, pt was not hypotensive, Hgb 13-14s, BG in 400s, + beta hydroxybutyrate, pH 7.16. Protonix gtt started. 3L fluid boluses given. Insulin gtt started. No hypotension or tachycardia. Pt is alert, oriented. Appears ill.     Code Status  Full Code    Review of Systems  Review of Systems   Constitutional: Positive for diaphoresis and fatigue. Negative for activity change, appetite change and chills.   Respiratory: Positive for shortness of breath. Negative for cough and wheezing.    Cardiovascular: Negative for chest pain, palpitations and leg swelling.   Gastrointestinal: Negative for abdominal pain, diarrhea, nausea and vomiting.        Right side flank pain   Genitourinary: Positive for flank pain. Negative for difficulty urinating, dysuria and frequency.   Musculoskeletal: Negative for arthralgias, back pain, joint swelling and myalgias.   Skin: Negative for rash.   Neurological: Negative for seizures, weakness and light-headedness.   Psychiatric/Behavioral: Negative for agitation. The patient is not nervous/anxious.        Past Medical History   has a past medical history of Backpain; Bronchitis; Diabetes type 1, controlled (HCC); DKA (diabetic ketoacidoses) ([facility]); Fall; Gastroparesis; Kidney infection; Pneumonia; and UTI (urinary tract infection).    Surgical History   has a past surgical history that includes gastroscopy-endo (9/18/2014); gastroscopy with biopsy (9/18/2014); other; submandible abscess incision and drainage (Left, 11/8/2017); dental extraction(s) (11/8/2017); and  gastroscopy-endo (N/A, 6/9/2018).    Family History  family history includes Cancer in her unknown relative; Hypertension in her maternal grandfather.    Social History   reports that she has never smoked. She has never used smokeless tobacco. She reports that she does not drink alcohol or use drugs.    Medications  Home Medications     Reviewed by Sugey Joiner R.N. (Registered Nurse) on 01/09/19 at 0712  Med List Status: Partial   Medication Last Dose Status   atorvastatin (LIPITOR) 20 MG Tab 1/8/2019 Active   Cholecalciferol 2000 UNIT Cap 1/8/2019 Active   ferrous sulfate 325 (65 Fe) MG tablet 1/8/2019 Active   Insulin Glargine (BASAGLAR KWIKPEN) 100 UNIT/ML Solution Pen-injector 1/8/2019 Active   insulin glulisine (APIDRA) 100 UNIT/ML Solution Pen-injector injection 1/8/2019 Active   losartan (COZAAR) 50 MG Tab 1/8/2019 Active   metoclopramide (REGLAN) 10 MG/10ML Solution 1/8/2019 Active   Omega-3 Fatty Acids (OMEGA 3 PO) 1/8/2019 Active   omeprazole (PRILOSEC) 20 MG delayed-release capsule 1/8/2019 Active   ondansetron (ZOFRAN ODT) 4 MG TABLET DISPERSIBLE 1/8/2019 Active              Current Facility-Administered Medications   Medication Dose Route Frequency Provider Last Rate Last Dose   • pantoprazole (PROTONIX) 80 mg in  mL Infusion  8 mg/hr Intravenous Continuous Chris Street M.D. 25 mL/hr at 01/09/19 0921 8 mg/hr at 01/09/19 0921   • dextrose 10% and 0.45% NaCl infusion   Intravenous Continuous Chris Street M.D.       • MD ALERT-PHARMACY TO CONSULT FOR DKA MONITORING 1 Each  1 Each Other PRN Chris Street M.D.       • magnesium sulfate IVPB premix 2 g  2 g Intravenous Once PRN Chris Street M.D.        Or   • magnesium sulfate IVPB premix 4 g  4 g Intravenous Once PRN Chris Street M.D.       • potassium phosphates 30 mmol in  mL ivpb  30 mmol Intravenous Once PRN Chris Street M.D.       • Adult DKA potassium(K+) replacement scale  1 Each Intravenous Q4HRS Adnan Jad, M.D.       • lactated  ringers infusion   Intravenous Continuous Chris Street M.D.       • D5 NS infusion   Intravenous Continuous Chris Street M.D.       • metoclopramide (REGLAN) injection 10 mg  10 mg Intravenous Q6HRS Chris Street M.D.   10 mg at 01/09/19 1132   • HYDROmorphone pf (DILAUDID) injection 0.5 mg  0.5 mg Intravenous Q3HRS PRN Chris Street M.D.   0.5 mg at 01/09/19 1132   • senna-docusate (PERICOLACE or SENOKOT S) 8.6-50 MG per tablet 2 Tab  2 Tab Oral BID Chris Street M.D.        And   • polyethylene glycol/lytes (MIRALAX) PACKET 1 Packet  1 Packet Oral QDAY PRN Chris Street M.D.        And   • magnesium hydroxide (MILK OF MAGNESIA) suspension 30 mL  30 mL Oral QDAY PRN Chris Street M.D.        And   • bisacodyl (DULCOLAX) suppository 10 mg  10 mg Rectal QDAY PRN Chris Street M.D.       • Respiratory Care per Protocol   Nebulization Continuous RT Chris Street M.D.       • ondansetron (ZOFRAN) syringe/vial injection 4 mg  4 mg Intravenous Q4HRS PRN Chris Street M.D.       • ondansetron (ZOFRAN ODT) dispertab 4 mg  4 mg Oral Q4HRS PRN Chris Street M.D.       • acetaminophen (TYLENOL) tablet 650 mg  650 mg Oral Q6HRS PRN Chris Street M.D.       • insulin regular human (HUMULIN/NOVOLIN R) 62.5 Units in  mL Infusion for DKA  4 Units/hr Intravenous Continuous Chris Street M.D.       • hyoscyamine-maalox plus-lidocaine viscous (GI COCKTAIL) oral susp 30 mL  30 mL Oral Once Chris Street M.D.       • sucralfate (CARAFATE) 1 GM/10ML suspension 1 g  1 g Oral Once Chris Street M.D.         Current Outpatient Prescriptions   Medication Sig Dispense Refill   • ondansetron (ZOFRAN ODT) 4 MG TABLET DISPERSIBLE Take 1 Tab by mouth every four hours as needed for Nausea (give PO if no IV route available). 10 Tab 0   • losartan (COZAAR) 50 MG Tab Take 1 Tab by mouth every day. 30 Tab 0   • omeprazole (PRILOSEC) 20 MG delayed-release capsule Take 1 Cap by mouth every day. 30 Cap 0   • Omega-3 Fatty Acids (OMEGA 3 PO) Take 1 Dose by mouth  every day. Unknown OTC Strength     • Insulin Glargine (BASAGLAR KWIKPEN) 100 UNIT/ML Solution Pen-injector Inject 32 Units as instructed 2 Times a Day.     • insulin glulisine (APIDRA) 100 UNIT/ML Solution Pen-injector injection Inject 2-20 Units as instructed 3 times a day, with meals. FSBS base 150  For every 50 increase in blood sugar insulin doubles  200 = 2 units  250 = 4 units  300 = 8 units  350 =  16 units  400 = 32 units      • metoclopramide (REGLAN) 10 MG/10ML Solution Take 10 mg by mouth 3 times a day before meals.     • ferrous sulfate 325 (65 Fe) MG tablet Take 325 mg by mouth every day.     • Cholecalciferol 2000 UNIT Cap Take 2,000 Units by mouth every day.     • atorvastatin (LIPITOR) 20 MG Tab Take 1 Tab by mouth every day. 30 Tab 0       Allergies  Allergies   Allergen Reactions   • Pcn [Penicillins] Shortness of Breath and Swelling     Per patient's Mom, patient tolerates Keflex   • Lisinopril Unspecified     Per historical: Reported pedal swelling. No facial/angioedema or rash nor respiratory symptoms.    • Promethazine Vomiting       Vital Signs last 24 hours  Temp:  [36.2 °C (97.2 °F)] 36.2 °C (97.2 °F)  Pulse:  [121-145] 131  Resp:  [18-37] 37  BP: (115)/(78) 115/78    Physical Exam  Physical Exam   Constitutional: She appears distressed.   Appears not comfortable   HENT:   Head: Normocephalic and atraumatic.   Eyes: Conjunctivae are normal. Scleral icterus is present.   Neck: Neck supple.   Cardiovascular: Normal rate, regular rhythm and normal heart sounds.    No murmur heard.  Pulmonary/Chest: Breath sounds normal. She is in respiratory distress. She has no wheezes. She has no rales.   tachypneic as compensation from metabolic acidosis   Abdominal: Bowel sounds are normal. She exhibits no distension. There is no tenderness. There is no rebound and no guarding.   Musculoskeletal: She exhibits no edema.   Neurological: She is alert.   Moving all extremities, no focal neuro deficit   Skin:  No rash noted. She is diaphoretic. No erythema.   Psychiatric: She has a normal mood and affect.   Nursing note and vitals reviewed.      Fluids  No intake or output data in the 24 hours ending 01/09/19 1138    Laboratory  Recent Results (from the past 48 hour(s))   ACCU-CHEK GLUCOSE    Collection Time: 01/09/19  7:16 AM   Result Value Ref Range    Glucose - Accu-Ck 428 (HH) 65 - 99 mg/dL   COMP METABOLIC PANEL    Collection Time: 01/09/19  7:29 AM   Result Value Ref Range    Sodium 133 (L) 135 - 145 mmol/L    Potassium 4.5 3.6 - 5.5 mmol/L    Chloride 103 96 - 112 mmol/L    Co2 <5 (LL) 20 - 33 mmol/L    Anion Gap 25.0 (H) 0.0 - 11.9    Glucose 433 (H) 65 - 99 mg/dL    Bun 22 8 - 22 mg/dL    Creatinine 0.79 0.50 - 1.40 mg/dL    Calcium 9.6 8.5 - 10.5 mg/dL    AST(SGOT) 17 12 - 45 U/L    ALT(SGPT) 17 2 - 50 U/L    Alkaline Phosphatase 86 30 - 99 U/L    Total Bilirubin 0.5 0.1 - 1.5 mg/dL    Albumin 4.3 3.2 - 4.9 g/dL    Total Protein 7.3 6.0 - 8.2 g/dL    Globulin 3.0 1.9 - 3.5 g/dL    A-G Ratio 1.4 g/dL   MAGNESIUM    Collection Time: 01/09/19  7:29 AM   Result Value Ref Range    Magnesium 1.9 1.5 - 2.5 mg/dL   PHOSPHORUS    Collection Time: 01/09/19  7:29 AM   Result Value Ref Range    Phosphorus 3.9 2.5 - 4.5 mg/dL   ESTIMATED GFR    Collection Time: 01/09/19  7:29 AM   Result Value Ref Range    GFR If African American >60 >60 mL/min/1.73 m 2    GFR If Non African American >60 >60 mL/min/1.73 m 2   ARTERIAL BLOOD GAS w/ O2 (LAB)    Collection Time: 01/09/19  8:30 AM   Result Value Ref Range    Ph 7.16 (LL) 7.40 - 7.50    Pco2 11.9 (L) 26.0 - 37.0 mmHg    Po2 218.2 (H) 64.0 - 87.0 mmHg    O2 Saturation 98.3 93.0 - 99.0 %    Hco3 4 (L) 17 - 25 mmol/L    Base Excess -22 (L) -4 - 3 mmol/L    Body Temp see below Centigrade   BETA-HYDROXYBUTYRIC ACID    Collection Time: 01/09/19  9:18 AM   Result Value Ref Range    beta-Hydroxybutyric Acid >8.00 (H) 0.02 - 0.27 mmol/L   HCG QUAL SERUM    Collection Time: 01/09/19  9:18  AM   Result Value Ref Range    Beta-Hcg Qualitative Serum Negative Negative   CBC WITH DIFFERENTIAL    Collection Time: 01/09/19  9:18 AM   Result Value Ref Range    WBC 18.5 (H) 4.8 - 10.8 K/uL    RBC 5.00 4.20 - 5.40 M/uL    Hemoglobin 14.9 12.0 - 16.0 g/dL    Hematocrit 45.0 37.0 - 47.0 %    MCV 90.0 81.4 - 97.8 fL    MCH 29.8 27.0 - 33.0 pg    MCHC 33.1 (L) 33.6 - 35.0 g/dL    RDW 44.4 35.9 - 50.0 fL    Platelet Count 267 164 - 446 K/uL    MPV 10.7 9.0 - 12.9 fL    Neutrophils-Polys 83.60 (H) 44.00 - 72.00 %    Lymphocytes 11.90 (L) 22.00 - 41.00 %    Monocytes 2.40 0.00 - 13.40 %    Eosinophils 0.10 0.00 - 6.90 %    Basophils 0.50 0.00 - 1.80 %    Immature Granulocytes 1.50 (H) 0.00 - 0.90 %    Nucleated RBC 0.00 /100 WBC    Neutrophils (Absolute) 15.43 (H) 2.00 - 7.15 K/uL    Lymphs (Absolute) 2.19 1.00 - 4.80 K/uL    Monos (Absolute) 0.45 0.00 - 0.85 K/uL    Eos (Absolute) 0.02 0.00 - 0.51 K/uL    Baso (Absolute) 0.09 0.00 - 0.12 K/uL    Immature Granulocytes (abs) 0.27 (H) 0.00 - 0.11 K/uL    NRBC (Absolute) 0.00 K/uL   ACCU-CHEK GLUCOSE    Collection Time: 01/09/19 10:06 AM   Result Value Ref Range    Glucose - Accu-Ck 383 (H) 65 - 99 mg/dL   Urinalysis, culture if indicated    Collection Time: 01/09/19 11:14 AM   Result Value Ref Range    Color Yellow     Character Clear     Specific Gravity 1.031 <1.035    Ph 5.0 5.0 - 8.0    Glucose >=1000 (A) Negative mg/dL    Ketones >=160 Negative mg/dL    Protein 100 (A) Negative mg/dL    Bilirubin Negative Negative    Urobilinogen, Urine 0.2 Negative    Nitrite Negative Negative    Leukocyte Esterase Negative Negative    Occult Blood Negative Negative    Micro Urine Req Microscopic        Imaging  [id number] VIEW   Final Result      Constipation pattern of the colon.      DX-CHEST-LIMITED (1 VIEW)   Final Result      No acute cardiopulmonary disease.          Assessment/Plan  * Upper GI bleeding   Assessment & Plan    Start protonix gtt  GI consult  Large bore IV,  central line placed  Serial H/H  Transfuse when Hgb <7     Diabetic ketoacidosis (HCC)- (present on admission)   Assessment & Plan    DKA protocol  Fluid boluses and maintenance.   Will give another liter of LR  Insulin gtt  Possible etiology is upper GI bleed. No other infectious source identified.          Metabolic acidosis- (present on admission)   Assessment & Plan    High anion gap metabolic acidosis, likely due to DKA.   Bicarb <5 with pH 7.16. Pt is alert oriented and appropriate.   No role of bicarb at this point. Correction of her DKA will improve her bicarb.         Intractable nausea and vomiting- (present on admission)   Assessment & Plan    From DKA +/- gastroparesis  Zofran prn nausea       Admit to ICU    Discussed patient condition and risk of morbidity and/or mortality with RN, RT, Pharmacy and Patient.    The patient remains critically ill.  Critical care time = 38 minutes in directly providing and coordinating critical care and extensive data review.  No time overlap and excludes procedures.

## 2019-01-09 NOTE — ED NOTES
Cecelia from Lab called with critical result of CO2 less than 5 at 851. Critical lab result read back to Cecelia.   Dr. Torres notified of critical lab result at 0852.  Critical lab result read back by Dr. Torres.

## 2019-01-09 NOTE — ED NOTES
from Lab called with critical result of CO <5 at 1412. Critical lab result read back to .   Dr. Street notified of critical lab result at 1429.  Critical lab result read back by Dr. Street.

## 2019-01-09 NOTE — ASSESSMENT & PLAN NOTE
Appears related to DKA / diabetic gastroparesis  C/O coffee ground emesis  IV protonix 80 mg followed by 8 mg / hour   If hematemesis recurrent, GI consultation  X 1 Zofran / IV Reglan scheduled   GI cocktail / Sucralfate  Emesis control  Mainstay will be glycemic control long-term

## 2019-01-09 NOTE — ASSESSMENT & PLAN NOTE
2/2 intractable vomiting / gastroparesis / DKA related   Plan as above in N/V  GI consultation if persistent N/V and drop in H&H / hematemesis evident   Check KUB / CXRAY   IV dilaudid for pain control at this time

## 2019-01-09 NOTE — ASSESSMENT & PLAN NOTE
Status post DKA protocol  Continue long and short acting insulin  Diabetes education, diabetic diet  Discontinue IV fluids

## 2019-01-09 NOTE — ED PROVIDER NOTES
ED Provider Note    Scribed for Jeb Torres M.D. by Anita Dawson 1/9/2019  7:56 AM    Primary care provider: Navi Huber M.D.  Means of arrival: Wheel-chair  History obtained from: Patient  History limited by: None    CHIEF COMPLAINT  Chief Complaint   Patient presents with   • N/V   • High Blood Sugar   • Abdominal Pain   • Chest Wall Pain   • Cough       HPI  Alis Sparks is a 28 y.o. female who presents to the Emergency Department for abdominal pain and vomiting with suspected DKA, onset yesterday. She has been experiencing nausea with vomiting for the past 2 days and when she awoke this morning, she noted blood in her vomit in addition to worsened abdominal pain. She denies other symptom such as loss of consciousness or fever. The patient has type I diabetes and is normally on 5 shots of insulin daily, reporting her most recent shot was last night. She was most recently admitted for DKA on Nov. 8, 2018 and has multiple previous admissions.      REVIEW OF SYSTEMS  Pertinent positives include nausea, hematemesis, right sided abdominal pain.   Pertinent negatives include no loss of consciousness or fevers.    All other systems reviewed and negative. See HPI for further details.       PAST MEDICAL HISTORY   has a past medical history of Backpain; Bronchitis; Diabetes type 1, controlled (HCC); DKA (diabetic ketoacidoses) (HCC); Fall; Gastroparesis; Kidney infection; Pneumonia; and UTI (urinary tract infection).    SURGICAL HISTORY   has a past surgical history that includes gastroscopy-endo (9/18/2014); gastroscopy with biopsy (9/18/2014); other; submandible abscess incision and drainage (Left, 11/8/2017); dental extraction(s) (11/8/2017); and gastroscopy-endo (N/A, 6/9/2018).    SOCIAL HISTORY  Social History   Substance Use Topics   • Smoking status: Never Smoker   • Smokeless tobacco: Never Used   • Alcohol use No      History   Drug Use No       FAMILY HISTORY  Family History    Problem Relation Age of Onset   • Cancer Unknown         breasts, multiple family members   • Hypertension Maternal Grandfather        CURRENT MEDICATIONS  Home Medications     Reviewed by Sugey Joiner R.N. (Registered Nurse) on 01/09/19 at 0712  Med List Status: Partial   Medication Last Dose Status   atorvastatin (LIPITOR) 20 MG Tab 1/8/2019 Active   Cholecalciferol 2000 UNIT Cap 1/8/2019 Active   ferrous sulfate 325 (65 Fe) MG tablet 1/8/2019 Active   Insulin Glargine (BASAGLAR KWIKPEN) 100 UNIT/ML Solution Pen-injector 1/8/2019 Active   insulin glulisine (APIDRA) 100 UNIT/ML Solution Pen-injector injection 1/8/2019 Active   losartan (COZAAR) 50 MG Tab 1/8/2019 Active   metoclopramide (REGLAN) 10 MG/10ML Solution 1/8/2019 Active   Omega-3 Fatty Acids (OMEGA 3 PO) 1/8/2019 Active   omeprazole (PRILOSEC) 20 MG delayed-release capsule 1/8/2019 Active   ondansetron (ZOFRAN ODT) 4 MG TABLET DISPERSIBLE 1/8/2019 Active                ALLERGIES  Allergies   Allergen Reactions   • Pcn [Penicillins] Shortness of Breath and Swelling     Per patient's Mom, patient tolerates Keflex   • Lisinopril Unspecified     Per historical: Reported pedal swelling. No facial/angioedema or rash nor respiratory symptoms.    • Promethazine Vomiting       PHYSICAL EXAM  VITAL SIGNS: /78   Pulse (!) 145   Temp 36.2 °C (97.2 °F) (Temporal)   Resp 18   Wt 77.8 kg (171 lb 8.3 oz)   LMP 12/26/2018   SpO2 93%   BMI 28.54 kg/m²     Nursing note and vitals reviewed.  Constitutional: Well-developed and well-nourished.  Moderate distress. Diaphoretic.   HENT: Head is normocephalic and atraumatic. Oropharynx is clear and very dry with dried coffee-ground emesis on the tongue.  Eyes: Pupils are equal, round, and reactive to light. Conjunctiva are normal.   Cardiovascular: Tachycardic and regular rhythm. No murmur heard. Normal radial pulses.  Pulmonary/Chest: Breath sounds normal. No wheezes or rales. Tachypnea noted.  Abdominal: Soft  and non-tender. No distention    Musculoskeletal: Extremities exhibit normal range of motion without edema or tenderness.   Neurological: Awake, alert and oriented to person, place, and time. No focal deficits noted.  Skin: Skin is warm and dry. No rash.   Psychiatric: Normal mood and affect. Appropriate for clinical situation.    DIAGNOSTIC STUDIES / PROCEDURES    LABS  Labs Reviewed   COMP METABOLIC PANEL - Abnormal; Notable for the following:        Result Value    Sodium 133 (*)     Co2 <5 (*)     Anion Gap 25.0 (*)     Glucose 433 (*)     All other components within normal limits   BETA-HYDROXYBUTYRIC ACID - Abnormal; Notable for the following:     beta-Hydroxybutyric Acid >8.00 (*)     All other components within normal limits   ARTERIAL BLOOD GAS - Abnormal; Notable for the following:     Ph 7.16 (*)     Pco2 11.9 (*)     Po2 218.2 (*)     Hco3 4 (*)     Base Excess -22 (*)     All other components within normal limits   CBC WITH DIFFERENTIAL - Abnormal; Notable for the following:     WBC 18.5 (*)     MCHC 33.1 (*)     Neutrophils-Polys 83.60 (*)     Lymphocytes 11.90 (*)     Immature Granulocytes 1.50 (*)     Neutrophils (Absolute) 15.43 (*)     Immature Granulocytes (abs) 0.27 (*)     All other components within normal limits    Narrative:     SPECIMEN IS A RECOLLECT   ACCU-CHEK GLUCOSE - Abnormal; Notable for the following:     Glucose - Accu-Ck 428 (*)     All other components within normal limits   ACCU-CHEK GLUCOSE - Abnormal; Notable for the following:     Glucose - Accu-Ck 383 (*)     All other components within normal limits   MAGNESIUM   PHOSPHORUS   HCG QUAL SERUM   ESTIMATED GFR   URINALYSIS,CULTURE IF INDICATED   BASIC METABOLIC PANEL   BASIC METABOLIC PANEL   OSMOLALITY SERUM   TSH   HEMOGLOBIN A1C     All labs reviewed by me.    COURSE & MEDICAL DECISION MAKING  Nursing notes, VS, PMSFHx reviewed in chart.     Review of past medical records shows the patient was admitted on November 9, 2018 for  DKA and has multiple admissions for DKA in the past.      7:10 AM - Patient seen and examined at bedside.  Ordered CBC with diff, Accu-check glucose, POC UA, and CMPto evaluate her symptoms. I informed the patient that she will likely be admitted to the ICU for GI bleed and DKA after lab work has resulted.    7:20 AM - Ordered estimated GFR, venous blood gas, beta-hydroxybutyric acid, phosphorous and magnesium to evaluate her symptoms.    HYDRATION: Based on the patient's presentation of DKA the patient was given IV fluids. IV Hydration was used because oral hydration was not as rapid as required. Upon recheck following hydration, the patient was improved.     7:51 AM - Ordered HCG qual serum to evaluate her symptoms. Patient will be treated with 4 mg of IV Zofran and  80 mg of Protonix in  mL IV.    8:13 AM - Patient will be treated with 1 mg of IV Dilaudid. Ordered Arterial blood gas to evaluate her symptoms.    9:04 AM -  I discussed the patient's case and the above findings with Dr. Street (hospitalist) who will admit the patient in guarded condition.    9:15 AM - Patient was reevaluated at this time and feeling improved. She will receive another liter at this time. Ordered 1000 mL NS.    9:18 AM - Patient will be admitted for DKA and hematemesis to ICU and was in agreement with this plan.     10:13 AM - 10:13 AM I discussed the patient's case and the above findings with Dr. Lincoln (intensivist) who agrees to follow the patient.    CRITICAL CARE  I provided critical care services, which included medication orders, frequent reevaluations of the patient's condition and response to treatment, ordering and reviewing test results, and discussing the case with various consultants.  The critical care time associated with the care of the patient was 35 minutes. Review chart for interventions. This time is exclusive of any other billable procedures.     The differential diagnoses include but are not limited to: DKA,  GI bleed, electrolyte abnormality, urinary tract infection    DISPOSITION:  Patient will be admitted to Dr. Street in guarded condition.    FINAL IMPRESSION  1. Diabetic ketoacidosis without coma associated with type 1 diabetes mellitus (HCC)    2. Hematemesis with nausea     Critical care time as noted above.     Anita BRAMBILA (Scribe), am scribing for, and in the presence of, Jeb Torres M.D..    Electronically signed by: Anita Gudino (Darynibe), 1/9/2019    IJeb M.D. personally performed the services described in this documentation, as scribed by Anita Gudino in my presence, and it is both accurate and complete. C.    The note accurately reflects work and decisions made by me.  Jeb Torres  1/9/2019  11:50 AM

## 2019-01-09 NOTE — ED NOTES
FSBS- 266,  Assisted pt to BSC,  Voided approx 1000cc of clear yellow urine.  No assessment changed.  Denies other needs at this time.

## 2019-01-09 NOTE — ED NOTES
FSBS checked- 376.  Insulin drip initiated.  Medicated per MAR.  Labs drawn and sent from line.     Pt appears to be sleeping, even unlabored resps,  Will cont to monitor.

## 2019-01-09 NOTE — ED NOTES
Susie from Lab called with critical result of ph 7.16 at 842. Critical lab result read back to Susie.   Dr. Torres notified of critical lab result at 842.  Critical lab result read back by Dr. Torres.

## 2019-01-09 NOTE — ED NOTES
Labs pulled from line, 100cc of dark emesis, pt provided new emesis bag  Pt resting on gurney, pt provided call light, instructed to call if needing any assistance, instructed not to get up by self, gurney in lowest position.

## 2019-01-09 NOTE — ED TRIAGE NOTES
Pt to triage .  Chief Complaint   Patient presents with   • N/V   • High Blood Sugar   • Abdominal Pain   • Chest Wall Pain   • Cough

## 2019-01-09 NOTE — ED NOTES
Pt back to room 4 with mother  Pt reports last visit she had a cental line and midline placed for IV

## 2019-01-09 NOTE — PROCEDURES
ULTRASOUND-GUIDED CENTRAL LINE PLACEMENT PROCEDURE NOTE      Date: 1/9/2019  Time: 12:24 PM    Time out: performed. Name, MRN, allergy and procedure were confirmed.     Indication: difficult access, DKA, upper GI bleed    Consent: Informed consent obtained from patient or designated decision maker after explaining the benefits/risks along with alternatives. All questions were answered and the patient agreed to proceed.     Procedure: ultrasound-guided central line placement    Location: right internal jugular vein    Sedation: 3 cc of 1% lidocaine    Findings: Area of interest was swabbed with chlorhexidine three times. Patient was then draped in sterile fashion. All sterile precautions followed, including hand hygiene, sterile glove and gown, caps and mask, chlorhexidine skin prep and full barrier precautions. Patient is placed in Tredelenburg position. 1% lidocaine was administered for local anesthesia. 16cm central line was placed with Seldinger technique using ultrasound guidance. Venous transducer was used to confirm placement. The line was then secured with sutures.     Number of attempt: 1    Complications: The patient tolerated the procedure well with no immediate complications. The vital signs remained stable throughout the procedure.     Estimated blood loss: minimal    Plan: a post central line placement chest x-ray was ordered to confirm placement.     Michael Lincoln D.O.  Critical Care

## 2019-01-09 NOTE — ED NOTES
1400 FSBS 323, Pt with small amt of brown emesis, and c/o pain-  Medicated per MAR.    No other assessment changes.  Will cont to monitor.

## 2019-01-10 LAB
ALBUMIN SERPL BCP-MCNC: 3.7 G/DL (ref 3.2–4.9)
ALBUMIN/GLOB SERPL: 1.7 G/DL
ALP SERPL-CCNC: 70 U/L (ref 30–99)
ALT SERPL-CCNC: 14 U/L (ref 2–50)
ANION GAP SERPL CALC-SCNC: 10 MMOL/L (ref 0–11.9)
ANION GAP SERPL CALC-SCNC: 6 MMOL/L (ref 0–11.9)
ANION GAP SERPL CALC-SCNC: 7 MMOL/L (ref 0–11.9)
ANION GAP SERPL CALC-SCNC: 8 MMOL/L (ref 0–11.9)
ANION GAP SERPL CALC-SCNC: 8 MMOL/L (ref 0–11.9)
ANION GAP SERPL CALC-SCNC: 9 MMOL/L (ref 0–11.9)
AST SERPL-CCNC: 10 U/L (ref 12–45)
B-OH-BUTYR SERPL-MCNC: 1.46 MMOL/L (ref 0.02–0.27)
BASOPHILS # BLD AUTO: 0.2 % (ref 0–1.8)
BASOPHILS # BLD: 0.02 K/UL (ref 0–0.12)
BILIRUB SERPL-MCNC: 0.5 MG/DL (ref 0.1–1.5)
BUN SERPL-MCNC: 12 MG/DL (ref 8–22)
BUN SERPL-MCNC: 14 MG/DL (ref 8–22)
BUN SERPL-MCNC: 7 MG/DL (ref 8–22)
BUN SERPL-MCNC: 9 MG/DL (ref 8–22)
CALCIUM SERPL-MCNC: 7.8 MG/DL (ref 8.5–10.5)
CALCIUM SERPL-MCNC: 8.1 MG/DL (ref 8.5–10.5)
CALCIUM SERPL-MCNC: 8.1 MG/DL (ref 8.5–10.5)
CALCIUM SERPL-MCNC: 8.4 MG/DL (ref 8.5–10.5)
CALCIUM SERPL-MCNC: 8.4 MG/DL (ref 8.5–10.5)
CALCIUM SERPL-MCNC: 8.5 MG/DL (ref 8.5–10.5)
CHLORIDE SERPL-SCNC: 111 MMOL/L (ref 96–112)
CHLORIDE SERPL-SCNC: 112 MMOL/L (ref 96–112)
CHLORIDE SERPL-SCNC: 118 MMOL/L (ref 96–112)
CHLORIDE SERPL-SCNC: 118 MMOL/L (ref 96–112)
CHLORIDE SERPL-SCNC: 119 MMOL/L (ref 96–112)
CHLORIDE SERPL-SCNC: 121 MMOL/L (ref 96–112)
CHOLEST SERPL-MCNC: 126 MG/DL (ref 100–199)
CO2 SERPL-SCNC: 11 MMOL/L (ref 20–33)
CO2 SERPL-SCNC: 11 MMOL/L (ref 20–33)
CO2 SERPL-SCNC: 13 MMOL/L (ref 20–33)
CO2 SERPL-SCNC: 14 MMOL/L (ref 20–33)
CO2 SERPL-SCNC: 15 MMOL/L (ref 20–33)
CO2 SERPL-SCNC: 18 MMOL/L (ref 20–33)
CREAT SERPL-MCNC: 0.42 MG/DL (ref 0.5–1.4)
CREAT SERPL-MCNC: 0.47 MG/DL (ref 0.5–1.4)
CREAT SERPL-MCNC: 0.47 MG/DL (ref 0.5–1.4)
CREAT SERPL-MCNC: 0.54 MG/DL (ref 0.5–1.4)
CREAT SERPL-MCNC: 0.59 MG/DL (ref 0.5–1.4)
CREAT SERPL-MCNC: 0.62 MG/DL (ref 0.5–1.4)
EOSINOPHIL # BLD AUTO: 0.01 K/UL (ref 0–0.51)
EOSINOPHIL NFR BLD: 0.1 % (ref 0–6.9)
ERYTHROCYTE [DISTWIDTH] IN BLOOD BY AUTOMATED COUNT: 43.9 FL (ref 35.9–50)
GLOBULIN SER CALC-MCNC: 2.2 G/DL (ref 1.9–3.5)
GLUCOSE BLD-MCNC: 113 MG/DL (ref 65–99)
GLUCOSE BLD-MCNC: 114 MG/DL (ref 65–99)
GLUCOSE BLD-MCNC: 114 MG/DL (ref 65–99)
GLUCOSE BLD-MCNC: 116 MG/DL (ref 65–99)
GLUCOSE BLD-MCNC: 118 MG/DL (ref 65–99)
GLUCOSE BLD-MCNC: 119 MG/DL (ref 65–99)
GLUCOSE BLD-MCNC: 119 MG/DL (ref 65–99)
GLUCOSE BLD-MCNC: 120 MG/DL (ref 65–99)
GLUCOSE BLD-MCNC: 122 MG/DL (ref 65–99)
GLUCOSE BLD-MCNC: 124 MG/DL (ref 65–99)
GLUCOSE BLD-MCNC: 131 MG/DL (ref 65–99)
GLUCOSE BLD-MCNC: 136 MG/DL (ref 65–99)
GLUCOSE BLD-MCNC: 138 MG/DL (ref 65–99)
GLUCOSE BLD-MCNC: 150 MG/DL (ref 65–99)
GLUCOSE BLD-MCNC: 152 MG/DL (ref 65–99)
GLUCOSE BLD-MCNC: 156 MG/DL (ref 65–99)
GLUCOSE BLD-MCNC: 166 MG/DL (ref 65–99)
GLUCOSE BLD-MCNC: 174 MG/DL (ref 65–99)
GLUCOSE BLD-MCNC: 177 MG/DL (ref 65–99)
GLUCOSE BLD-MCNC: 182 MG/DL (ref 65–99)
GLUCOSE BLD-MCNC: 182 MG/DL (ref 65–99)
GLUCOSE BLD-MCNC: 188 MG/DL (ref 65–99)
GLUCOSE SERPL-MCNC: 126 MG/DL (ref 65–99)
GLUCOSE SERPL-MCNC: 138 MG/DL (ref 65–99)
GLUCOSE SERPL-MCNC: 167 MG/DL (ref 65–99)
GLUCOSE SERPL-MCNC: 194 MG/DL (ref 65–99)
GLUCOSE SERPL-MCNC: 203 MG/DL (ref 65–99)
GLUCOSE SERPL-MCNC: 211 MG/DL (ref 65–99)
HCT VFR BLD AUTO: 36.1 % (ref 37–47)
HDLC SERPL-MCNC: 39 MG/DL
HGB BLD-MCNC: 11.8 G/DL (ref 12–16)
IMM GRANULOCYTES # BLD AUTO: 0.11 K/UL (ref 0–0.11)
IMM GRANULOCYTES NFR BLD AUTO: 1.1 % (ref 0–0.9)
LDLC SERPL CALC-MCNC: 73 MG/DL
LYMPHOCYTES # BLD AUTO: 1.33 K/UL (ref 1–4.8)
LYMPHOCYTES NFR BLD: 13.6 % (ref 22–41)
MAGNESIUM SERPL-MCNC: 1.8 MG/DL (ref 1.5–2.5)
MAGNESIUM SERPL-MCNC: 1.9 MG/DL (ref 1.5–2.5)
MCH RBC QN AUTO: 29.5 PG (ref 27–33)
MCHC RBC AUTO-ENTMCNC: 33.7 G/DL (ref 33.6–35)
MCV RBC AUTO: 87.6 FL (ref 81.4–97.8)
MONOCYTES # BLD AUTO: 0.79 K/UL (ref 0–0.85)
MONOCYTES NFR BLD AUTO: 8.1 % (ref 0–13.4)
NEUTROPHILS # BLD AUTO: 7.51 K/UL (ref 2–7.15)
NEUTROPHILS NFR BLD: 76.9 % (ref 44–72)
NRBC # BLD AUTO: 0 K/UL
NRBC BLD-RTO: 0 /100 WBC
PHOSPHATE SERPL-MCNC: 1.1 MG/DL (ref 2.5–4.5)
PHOSPHATE SERPL-MCNC: 2.9 MG/DL (ref 2.5–4.5)
PLATELET # BLD AUTO: 251 K/UL (ref 164–446)
PMV BLD AUTO: 9.9 FL (ref 9–12.9)
POTASSIUM SERPL-SCNC: 3 MMOL/L (ref 3.6–5.5)
POTASSIUM SERPL-SCNC: 3.1 MMOL/L (ref 3.6–5.5)
POTASSIUM SERPL-SCNC: 3.1 MMOL/L (ref 3.6–5.5)
POTASSIUM SERPL-SCNC: 3.5 MMOL/L (ref 3.6–5.5)
PROT SERPL-MCNC: 5.9 G/DL (ref 6–8.2)
RBC # BLD AUTO: 4.03 M/UL (ref 4.2–5.4)
SODIUM SERPL-SCNC: 134 MMOL/L (ref 135–145)
SODIUM SERPL-SCNC: 135 MMOL/L (ref 135–145)
SODIUM SERPL-SCNC: 139 MMOL/L (ref 135–145)
SODIUM SERPL-SCNC: 139 MMOL/L (ref 135–145)
SODIUM SERPL-SCNC: 140 MMOL/L (ref 135–145)
SODIUM SERPL-SCNC: 142 MMOL/L (ref 135–145)
TRIGL SERPL-MCNC: 69 MG/DL (ref 0–149)
WBC # BLD AUTO: 9.8 K/UL (ref 4.8–10.8)

## 2019-01-10 PROCEDURE — C9113 INJ PANTOPRAZOLE SODIUM, VIA: HCPCS | Performed by: INTERNAL MEDICINE

## 2019-01-10 PROCEDURE — 700105 HCHG RX REV CODE 258: Performed by: INTERNAL MEDICINE

## 2019-01-10 PROCEDURE — 700101 HCHG RX REV CODE 250: Performed by: INTERNAL MEDICINE

## 2019-01-10 PROCEDURE — 85025 COMPLETE CBC W/AUTO DIFF WBC: CPT

## 2019-01-10 PROCEDURE — 84100 ASSAY OF PHOSPHORUS: CPT

## 2019-01-10 PROCEDURE — 82010 KETONE BODYS QUAN: CPT

## 2019-01-10 PROCEDURE — 80048 BASIC METABOLIC PNL TOTAL CA: CPT | Mod: 91

## 2019-01-10 PROCEDURE — 80053 COMPREHEN METABOLIC PANEL: CPT

## 2019-01-10 PROCEDURE — 700111 HCHG RX REV CODE 636 W/ 250 OVERRIDE (IP): Performed by: HOSPITALIST

## 2019-01-10 PROCEDURE — 700102 HCHG RX REV CODE 250 W/ 637 OVERRIDE(OP): Performed by: INTERNAL MEDICINE

## 2019-01-10 PROCEDURE — 80061 LIPID PANEL: CPT

## 2019-01-10 PROCEDURE — 99291 CRITICAL CARE FIRST HOUR: CPT | Performed by: INTERNAL MEDICINE

## 2019-01-10 PROCEDURE — 82962 GLUCOSE BLOOD TEST: CPT

## 2019-01-10 PROCEDURE — 700111 HCHG RX REV CODE 636 W/ 250 OVERRIDE (IP): Performed by: INTERNAL MEDICINE

## 2019-01-10 PROCEDURE — 770022 HCHG ROOM/CARE - ICU (200)

## 2019-01-10 PROCEDURE — 83735 ASSAY OF MAGNESIUM: CPT

## 2019-01-10 PROCEDURE — 99233 SBSQ HOSP IP/OBS HIGH 50: CPT | Performed by: HOSPITALIST

## 2019-01-10 RX ORDER — POTASSIUM CHLORIDE 14.9 MG/ML
20 INJECTION INTRAVENOUS ONCE
Status: COMPLETED | OUTPATIENT
Start: 2019-01-10 | End: 2019-01-10

## 2019-01-10 RX ORDER — POTASSIUM CHLORIDE 29.8 MG/ML
40 INJECTION INTRAVENOUS ONCE
Status: COMPLETED | OUTPATIENT
Start: 2019-01-10 | End: 2019-01-10

## 2019-01-10 RX ORDER — LORAZEPAM 2 MG/ML
.5-1 INJECTION INTRAMUSCULAR EVERY 4 HOURS PRN
Status: DISCONTINUED | OUTPATIENT
Start: 2019-01-10 | End: 2019-01-15 | Stop reason: HOSPADM

## 2019-01-10 RX ORDER — SODIUM CHLORIDE, SODIUM LACTATE, POTASSIUM CHLORIDE, CALCIUM CHLORIDE 600; 310; 30; 20 MG/100ML; MG/100ML; MG/100ML; MG/100ML
1000 INJECTION, SOLUTION INTRAVENOUS ONCE
Status: DISCONTINUED | OUTPATIENT
Start: 2019-01-10 | End: 2019-01-10

## 2019-01-10 RX ORDER — PROCHLORPERAZINE 25 MG
25 SUPPOSITORY, RECTAL RECTAL EVERY 12 HOURS PRN
Status: DISCONTINUED | OUTPATIENT
Start: 2019-01-10 | End: 2019-01-15 | Stop reason: HOSPADM

## 2019-01-10 RX ORDER — POTASSIUM CHLORIDE 7.45 MG/ML
10 INJECTION INTRAVENOUS
Status: COMPLETED | OUTPATIENT
Start: 2019-01-10 | End: 2019-01-10

## 2019-01-10 RX ORDER — SODIUM CHLORIDE, SODIUM LACTATE, POTASSIUM CHLORIDE, CALCIUM CHLORIDE 600; 310; 30; 20 MG/100ML; MG/100ML; MG/100ML; MG/100ML
2000 INJECTION, SOLUTION INTRAVENOUS ONCE
Status: COMPLETED | OUTPATIENT
Start: 2019-01-10 | End: 2019-01-10

## 2019-01-10 RX ORDER — PANTOPRAZOLE SODIUM 40 MG/10ML
40 INJECTION, POWDER, LYOPHILIZED, FOR SOLUTION INTRAVENOUS 2 TIMES DAILY
Status: DISCONTINUED | OUTPATIENT
Start: 2019-01-10 | End: 2019-01-11

## 2019-01-10 RX ORDER — POTASSIUM CHLORIDE 29.8 MG/ML
40 INJECTION INTRAVENOUS ONCE
Status: COMPLETED | OUTPATIENT
Start: 2019-01-10 | End: 2019-01-11

## 2019-01-10 RX ORDER — POTASSIUM CHLORIDE 14.9 MG/ML
20 INJECTION INTRAVENOUS ONCE
Status: DISCONTINUED | OUTPATIENT
Start: 2019-01-10 | End: 2019-01-10

## 2019-01-10 RX ORDER — PROCHLORPERAZINE MALEATE 10 MG
10 TABLET ORAL EVERY 6 HOURS PRN
Status: DISCONTINUED | OUTPATIENT
Start: 2019-01-10 | End: 2019-01-15 | Stop reason: HOSPADM

## 2019-01-10 RX ORDER — MAGNESIUM SULFATE HEPTAHYDRATE 40 MG/ML
2 INJECTION, SOLUTION INTRAVENOUS ONCE
Status: COMPLETED | OUTPATIENT
Start: 2019-01-10 | End: 2019-01-10

## 2019-01-10 RX ADMIN — HYDROMORPHONE HYDROCHLORIDE 0.5 MG: 1 INJECTION, SOLUTION INTRAMUSCULAR; INTRAVENOUS; SUBCUTANEOUS at 18:09

## 2019-01-10 RX ADMIN — SODIUM CHLORIDE 4 UNITS/HR: 9 INJECTION, SOLUTION INTRAVENOUS at 05:09

## 2019-01-10 RX ADMIN — METOCLOPRAMIDE 10 MG: 5 INJECTION, SOLUTION INTRAMUSCULAR; INTRAVENOUS at 06:12

## 2019-01-10 RX ADMIN — SODIUM CHLORIDE 8 MG/HR: 9 INJECTION, SOLUTION INTRAVENOUS at 06:12

## 2019-01-10 RX ADMIN — HYDROMORPHONE HYDROCHLORIDE 0.5 MG: 1 INJECTION, SOLUTION INTRAMUSCULAR; INTRAVENOUS; SUBCUTANEOUS at 03:27

## 2019-01-10 RX ADMIN — HYDROMORPHONE HYDROCHLORIDE 0.5 MG: 1 INJECTION, SOLUTION INTRAMUSCULAR; INTRAVENOUS; SUBCUTANEOUS at 21:08

## 2019-01-10 RX ADMIN — METOCLOPRAMIDE 10 MG: 5 INJECTION, SOLUTION INTRAMUSCULAR; INTRAVENOUS at 11:32

## 2019-01-10 RX ADMIN — ONDANSETRON HYDROCHLORIDE 4 MG: 2 INJECTION INTRAMUSCULAR; INTRAVENOUS at 22:34

## 2019-01-10 RX ADMIN — METOCLOPRAMIDE 10 MG: 5 INJECTION, SOLUTION INTRAMUSCULAR; INTRAVENOUS at 17:18

## 2019-01-10 RX ADMIN — HYDROMORPHONE HYDROCHLORIDE 0.5 MG: 1 INJECTION, SOLUTION INTRAMUSCULAR; INTRAVENOUS; SUBCUTANEOUS at 15:18

## 2019-01-10 RX ADMIN — POTASSIUM CHLORIDE 20 MEQ: 14.9 INJECTION, SOLUTION INTRAVENOUS at 04:55

## 2019-01-10 RX ADMIN — DEXTROSE MONOHYDRATE: 100 INJECTION, SOLUTION INTRAVENOUS at 07:43

## 2019-01-10 RX ADMIN — DEXTROSE MONOHYDRATE: 100 INJECTION, SOLUTION INTRAVENOUS at 16:36

## 2019-01-10 RX ADMIN — SODIUM CHLORIDE, POTASSIUM CHLORIDE, SODIUM LACTATE AND CALCIUM CHLORIDE 2000 ML: 600; 310; 30; 20 INJECTION, SOLUTION INTRAVENOUS at 19:55

## 2019-01-10 RX ADMIN — LORAZEPAM 0.5 MG: 2 INJECTION INTRAMUSCULAR at 20:05

## 2019-01-10 RX ADMIN — HYDROMORPHONE HYDROCHLORIDE 0.5 MG: 1 INJECTION, SOLUTION INTRAMUSCULAR; INTRAVENOUS; SUBCUTANEOUS at 07:37

## 2019-01-10 RX ADMIN — SODIUM CHLORIDE, POTASSIUM CHLORIDE, SODIUM LACTATE AND CALCIUM CHLORIDE 2000 ML: 600; 310; 30; 20 INJECTION, SOLUTION INTRAVENOUS at 07:37

## 2019-01-10 RX ADMIN — POTASSIUM PHOSPHATE, MONOBASIC AND POTASSIUM PHOSPHATE, DIBASIC 30 MMOL: 224; 236 INJECTION, SOLUTION INTRAVENOUS at 03:12

## 2019-01-10 RX ADMIN — POTASSIUM CHLORIDE 10 MEQ: 7.46 INJECTION, SOLUTION INTRAVENOUS at 10:46

## 2019-01-10 RX ADMIN — MAGNESIUM SULFATE IN WATER 2 G: 40 INJECTION, SOLUTION INTRAVENOUS at 02:21

## 2019-01-10 RX ADMIN — POTASSIUM CHLORIDE 40 MEQ: 29.8 INJECTION, SOLUTION INTRAVENOUS at 21:10

## 2019-01-10 RX ADMIN — POTASSIUM CHLORIDE 40 MEQ: 29.8 INJECTION, SOLUTION INTRAVENOUS at 15:03

## 2019-01-10 RX ADMIN — PANTOPRAZOLE SODIUM 40 MG: 40 INJECTION, POWDER, LYOPHILIZED, FOR SOLUTION INTRAVENOUS at 17:18

## 2019-01-10 RX ADMIN — POTASSIUM CHLORIDE 10 MEQ: 7.46 INJECTION, SOLUTION INTRAVENOUS at 11:37

## 2019-01-10 RX ADMIN — SODIUM CHLORIDE 4 UNITS/HR: 9 INJECTION, SOLUTION INTRAVENOUS at 20:09

## 2019-01-10 ASSESSMENT — PAIN SCALES - GENERAL
PAINLEVEL_OUTOF10: 8
PAINLEVEL_OUTOF10: 8
PAINLEVEL_OUTOF10: 4
PAINLEVEL_OUTOF10: 9
PAINLEVEL_OUTOF10: 8
PAINLEVEL_OUTOF10: 3
PAINLEVEL_OUTOF10: 4
PAINLEVEL_OUTOF10: 2
PAINLEVEL_OUTOF10: 4
PAINLEVEL_OUTOF10: 6
PAINLEVEL_OUTOF10: 8
PAINLEVEL_OUTOF10: 3

## 2019-01-10 ASSESSMENT — ENCOUNTER SYMPTOMS
DIAPHORESIS: 1
DIZZINESS: 0
NERVOUS/ANXIOUS: 1
NAUSEA: 1
EYES NEGATIVE: 1
HEADACHES: 0
BACK PAIN: 0
HEARTBURN: 0
DEPRESSION: 0
FOCAL WEAKNESS: 0
SHORTNESS OF BREATH: 1
NECK PAIN: 0
CHILLS: 0
VOMITING: 1
MUSCULOSKELETAL NEGATIVE: 1
FEVER: 0
CARDIOVASCULAR NEGATIVE: 1
PALPITATIONS: 0
COUGH: 0
POLYDIPSIA: 0
WEAKNESS: 1
BRUISES/BLEEDS EASILY: 0

## 2019-01-10 ASSESSMENT — COGNITIVE AND FUNCTIONAL STATUS - GENERAL
HELP NEEDED FOR BATHING: A LITTLE
SUGGESTED CMS G CODE MODIFIER MOBILITY: CI
SUGGESTED CMS G CODE MODIFIER DAILY ACTIVITY: CJ
MOBILITY SCORE: 23
TOILETING: A LITTLE
DAILY ACTIVITIY SCORE: 22
CLIMB 3 TO 5 STEPS WITH RAILING: A LITTLE

## 2019-01-10 NOTE — PROGRESS NOTES
Pt complaining of 9/10 right flank pain, pt also has uncontrolled nausea and tachycardia, Dr Jad kirkpatrick

## 2019-01-10 NOTE — PROGRESS NOTES
Tech from Lab called with critical result of CO2 of 6 at 2100. Critical lab result read back to tech.   Dr. Gayle notified of critical lab result at 2157.  Critical lab result read back by Dr. Gayle.

## 2019-01-10 NOTE — CARE PLAN
Problem: Safety  Goal: Will remain free from injury  Outcome: PROGRESSING AS EXPECTED  Bed in lowest position, appropriate signs in place, call light in reach, patient wearing treaded socks, patient educated to call before attempting to exit bed.     Problem: Pain Management  Goal: Pain level will decrease to patient's comfort goal  Outcome: PROGRESSING AS EXPECTED  Pain assessed q2 hours, patient non pharmaologic methods used first for pain management, if unsuccessful, use of pharmacologic methods implemented according to interdisciplinary teams recommendation.

## 2019-01-10 NOTE — PROGRESS NOTES
Dr Street returned page, tachycardia may be addressed with another bolus and the fluid type will depend on the patient's K+, nausea discussed and benadryl ordered, EKG on the way to evaluate QTc and rhythm for tachycardia in the 160s. MD is aware of the patient's 9/10 flank pain and pain meds will be given when due

## 2019-01-10 NOTE — PROGRESS NOTES
[facility] Medicine Daily Progress Note    Date of Service  1/10/2019    Chief Complaint  28 y.o. female admitted 1/9/2019 with onset of nausea vomiting and admitted with DKA.  Reports some coffee-ground emesis after multiple episodes of nausea vomiting.  Admitted for aggressive insulin management.    Hospital Course    28-year-old with gastroparesis, diabetes type 1 admitted for DKA      Interval Problem Update  Patient seen and examined today. ICU Care  Care and plan discussed in IDT/Hot rounds.  Lines and assistive devices reviewed.    Patient tolerating treatment and therapies.  All Data, Medication data reviewed.  Case discussed with nursing as available.  Plan of Care reviewed with patient and notified of changes.  1/10 the patient remains on insulin protocol, received IV fluids, anion gap is improving but CO2 remains low, the patient continues to have nausea vomiting, recent diagnosis of gastroparesis, no further GI bleeding noted, hemoglobin stable.  Consultants/Specialty  Intensivist    Code Status  Full code    Disposition  ICU    Review of Systems  Review of Systems   Constitutional: Positive for diaphoresis and malaise/fatigue. Negative for chills and fever.   HENT: Negative.    Eyes: Negative.    Respiratory: Positive for shortness of breath. Negative for cough.    Cardiovascular: Negative.  Negative for chest pain and palpitations.   Gastrointestinal: Positive for nausea and vomiting. Negative for heartburn.   Genitourinary: Negative.  Negative for dysuria and frequency.   Musculoskeletal: Negative.  Negative for back pain and neck pain.   Skin: Negative.  Negative for itching and rash.   Neurological: Positive for weakness. Negative for dizziness, focal weakness and headaches.   Endo/Heme/Allergies: Negative.  Negative for polydipsia. Does not bruise/bleed easily.   Psychiatric/Behavioral: Negative for depression. The patient is nervous/anxious.         Physical Exam  Temp:  [36.7 °C (98 °F)-37.2 °C  (98.9 °F)] 37.2 °C (98.9 °F)  Pulse:  [104-153] 107  Resp:  [14-51] 14    Physical Exam   Constitutional: She is oriented to person, place, and time. She appears well-developed and well-nourished. She appears lethargic. She has a sickly appearance.   HENT:   Head: Normocephalic and atraumatic.   Nose: Nose normal.   Mouth/Throat: Oropharynx is clear and moist.   Eyes: Pupils are equal, round, and reactive to light. Conjunctivae and EOM are normal.   Neck: Normal range of motion. Neck supple. No JVD present. No thyromegaly present.   Cardiovascular: Normal rate, regular rhythm and normal heart sounds.  Exam reveals no gallop and no friction rub.    Pulses:       Dorsalis pedis pulses are 2+ on the right side, and 2+ on the left side.   Capillary refill <3 secs   Pulmonary/Chest: Effort normal and breath sounds normal. She has no wheezes. She has no rales.   Abdominal: Soft. Bowel sounds are normal. She exhibits no distension and no mass. There is tenderness. There is no rebound and no guarding.   Musculoskeletal: Normal range of motion. She exhibits no edema or tenderness.   Lymphadenopathy:     She has no cervical adenopathy.   Neurological: She is oriented to person, place, and time. She appears lethargic. No cranial nerve deficit.   Skin: Skin is warm and dry. She is not diaphoretic. No cyanosis. There is pallor.   Psychiatric: She has a normal mood and affect. Her behavior is normal.   Nursing note and vitals reviewed.      Fluids    Intake/Output Summary (Last 24 hours) at 01/10/19 0736  Last data filed at 01/10/19 0600   Gross per 24 hour   Intake           8145.9 ml   Output             1850 ml   Net           6295.9 ml       Laboratory  Recent Labs      01/09/19   0918  01/10/19   0315   WBC  18.5*  9.8   RBC  5.00  4.03*   HEMOGLOBIN  14.9  11.8*   HEMATOCRIT  45.0  36.1*   MCV  90.0  87.6   MCH  29.8  29.5   MCHC  33.1*  33.7   RDW  44.4  43.9   PLATELETCT  267  251   MPV  10.7  9.9     Recent Labs       01/10/19   0108  01/10/19   0316  01/10/19   0613   SODIUM  142  139  139   POTASSIUM  3.5*  3.1*  3.5*   CHLORIDE  121*  119*  118*   CO2  11*  11*  13*   GLUCOSE  203*  211*  194*   BUN  14  14  14   CREATININE  0.62  0.59  0.54   CALCIUM  8.5  8.4*  8.1*             Recent Labs      01/10/19   0108   TRIGLYCERIDE  69   HDL  39*   LDL  73       Imaging  DX-CHEST-PORTABLE (1 VIEW)   Final Result      Right internal jugular line tip overlies the SVC. No pneumothorax.      [id number] VIEW   Final Result      Constipation pattern of the colon.      DX-CHEST-LIMITED (1 VIEW)   Final Result      No acute cardiopulmonary disease.           Assessment/Plan  * Upper GI bleeding   Assessment & Plan    On PPI  Possibly from nausea vomiting and esophageal tear  Monitor closely     Diabetic ketoacidosis (HCC)- (present on admission)   Assessment & Plan    ICU care  DKA protocol  Aggressive volume resuscitation  Titrate off insulin drip once the patient is able to take p.o.     Metabolic acidosis- (present on admission)   Assessment & Plan    Secondary to DKA  Adjusting  Monitor resuscitation     Epigastric abdominal pain- (present on admission)   Assessment & Plan    2/2 intractable vomiting / gastroparesis / DKA related   Plan as above in N/V  GI consultation if persistent N/V and drop in H&H / hematemesis evident   Check KUB / CXRAY   IV dilaudid for pain control at this time      Type 1 diabetes mellitus (HCC)- (present on admission)   Assessment & Plan    In DKA  Might benefit from diabetic education     Intractable nausea and vomiting- (present on admission)   Assessment & Plan    Appears related to DKA / diabetic gastroparesis  C/O coffee ground emesis  IV protonix 80 mg followed by 8 mg / hour   If hematemesis recurrent, GI consultation  X 1 Zofran / IV Reglan scheduled   GI cocktail / Sucralfate  Emesis control  Mainstay will be glycemic control long-term     Gastroparesis- (present on admission)   Assessment & Plan     Recent diagnosis with significant prolonged gastric emptying time  On Reglan  Mainstay will be aggressive glycemic control     Hyponatremia- (present on admission)   Assessment & Plan    Pseudohyponatremia / Hypovolemic  Continue medical management and monitor      Essential hypertension- (present on admission)   Assessment & Plan    Will resume PO regimen when clinically better     Dyslipidemia- (present on admission)   Assessment & Plan    Resume oral regimen once able to tolerate     Leukemoid reaction- (present on admission)   Assessment & Plan    Reactive from DKA  Monitor - no infectious concerns apparent at this time  UA pending  Will check CXRAY      Plan  Continue with insulin drip until patient able to eat p.o. Nutrition  Additional medications ordered for emesis control  Follow electrolytes closely  Continue with IV hydration  Symptom control  Follow labs closely  See orders  Critically ill  I have performed a physical exam and reviewed and updated ROS and Plan today . In review of yesterday's note , there are no changes except as documented above.     VTE prophylaxis: Contraindicated chemically, SCD

## 2019-01-10 NOTE — PROGRESS NOTES
· 2 RN skin check complete with CAIT [place].  · Devices in place Bp cuff, PIV, CVC, pulse ox.  · Skin assessed under devices: Yes.  · Confirmed pressure ulcers found on N/A.  · New potential pressure ulcers noted on N/A. Wound consult placed and wound reported.  · The following interventions in place heels floated, pt turned q2 hours, devices repositioned q2 hours.

## 2019-01-10 NOTE — PROGRESS NOTES
Critical Care Progress Note    Date of admission  1/9/2019    Chief Complaint  28 y.o. female with hx of DM type 1 c/b neuropathy, retinopathy, and gastroparesis, hx of recurrent hospitalization for DKA recently in 12/2018 who now came back to ED after been having nausea and vomiting. Pt noted coffee ground emesis. At ED, pt was not hypotensive, Hgb 13-14s, BG in 400s, + beta hydroxybutyrate, pH 7.16. Protonix gtt started. 3L fluid boluses given. Insulin gtt started. No hypotension or tachycardia. Pt is alert, oriented. Appears ill.     Hospital Course    1/9 admitted for dka      Interval Problem Update  Reviewed last 24 hour events:  2 L iv lr this am  dka  Bicarb low 14, gap resolved  Insulin drip  Alert and oriented  Improved nausea/abdominal pain  Redraw at 9 am  Once bicarb more corrected transition  No dvt px due to gi bleed  lantus 32 u at home with high ssi  Change ppi to bid  ? Virgen collazo    Repeat chemistry bicarb remained 14  Repeated 2 L IVF  Evening chem bicarb 18  Next bicarb if over 18 discussed ok to transition  Go to home insulin, medium sliding scale  OK to feed at that time    Review of Systems  Review of Systems   Unable to perform ROS: Acuity of condition        Vital Signs for last 24 hours   Temp:  [36.2 °C (97.2 °F)-37.2 °C (98.9 °F)] 37.2 °C (98.9 °F)  Pulse:  [104-153] 107  Resp:  [14-51] 14  BP: (115)/(78) 115/78    Hemodynamic parameters for last 24 hours       Respiratory       Physical Exam   Physical Exam   Constitutional: She is oriented to person, place, and time. She appears distressed.   fatiuged   HENT:   Head: Normocephalic and atraumatic.   Right Ear: External ear normal.   Left Ear: External ear normal.   Mouth/Throat: No oropharyngeal exudate.   Eyes: Pupils are equal, round, and reactive to light. Conjunctivae and EOM are normal. Right eye exhibits no discharge. Left eye exhibits no discharge.   Neck: No JVD present. No tracheal deviation present.   Cardiovascular: Normal  rate, regular rhythm and normal heart sounds.    No murmur heard.  Pulmonary/Chest: Effort normal and breath sounds normal. No stridor. No respiratory distress. She has no wheezes. She has no rales. She exhibits no tenderness.   Abdominal: She exhibits no mass. There is tenderness. There is no rebound and no guarding.   imporved from overnight, now mild   Musculoskeletal: She exhibits no edema, tenderness or deformity.   Neurological: She is alert and oriented to person, place, and time. She displays normal reflexes. No cranial nerve deficit. Coordination normal.   Skin: Skin is warm and dry. No rash noted. She is not diaphoretic. No erythema.   Psychiatric:   Fatigued, lethargic       Medications  Current Facility-Administered Medications   Medication Dose Route Frequency Provider Last Rate Last Dose   • potassium chloride in water (KCL) ivpb **Administer in ICU only** 20 mEq  20 mEq Intravenous Once Michael Gayle M.D. 50 mL/hr at 01/10/19 0455 20 mEq at 01/10/19 0455   • pantoprazole (PROTONIX) 80 mg in  mL Infusion  8 mg/hr Intravenous Continuous Chris Street M.D. 25 mL/hr at 01/10/19 0612 8 mg/hr at 01/10/19 0612   • MD ALERT-PHARMACY TO CONSULT FOR DKA MONITORING 1 Each  1 Each Other PRN Chris Street M.D.       • potassium phosphates 30 mmol in  mL ivpb  30 mmol Intravenous Once PRN Chris Street M.D. 125 mL/hr at 01/10/19 0312 30 mmol at 01/10/19 0312   • Adult DKA potassium(K+) replacement scale  1 Each Intravenous Q4HRS Chris Street M.D.   1 Each at 01/10/19 0200   • lactated ringers infusion   Intravenous Continuous Chris Street M.D.   Stopped at 01/09/19 1655   • metoclopramide (REGLAN) injection 10 mg  10 mg Intravenous Q6HRS Chris Street M.D.   10 mg at 01/10/19 0612   • HYDROmorphone pf (DILAUDID) injection 0.5 mg  0.5 mg Intravenous Q3HRS PRN Chris Street M.D.   0.5 mg at 01/10/19 0327   • senna-docusate (PERICOLACE or SENOKOT S) 8.6-50 MG per tablet 2 Tab  2 Tab Oral BID Adnan  BRIGIDA Street   Stopped at 01/10/19 0600    And   • polyethylene glycol/lytes (MIRALAX) PACKET 1 Packet  1 Packet Oral QDAY PRN Chris Street M.D.        And   • magnesium hydroxide (MILK OF MAGNESIA) suspension 30 mL  30 mL Oral QDAY PRN Chris Street M.D.        And   • bisacodyl (DULCOLAX) suppository 10 mg  10 mg Rectal QDAY PRN Chris Street M.D.       • Respiratory Care per Protocol   Nebulization Continuous RT Chris Street M.D.       • ondansetron (ZOFRAN) syringe/vial injection 4 mg  4 mg Intravenous Q4HRS PRN Chris Street M.D.   4 mg at 01/09/19 2010   • ondansetron (ZOFRAN ODT) dispertab 4 mg  4 mg Oral Q4HRS PRN Chris Street M.D.   4 mg at 01/09/19 1610   • acetaminophen (TYLENOL) tablet 650 mg  650 mg Oral Q6HRS PRN Chris Street M.D.       • insulin regular human (HUMULIN/NOVOLIN R) 62.5 Units in  mL Infusion for DKA  4 Units/hr Intravenous Continuous Chris Street M.D. 16 mL/hr at 01/10/19 0620 4 Units/hr at 01/10/19 0620   • hyoscyamine-maalox plus-lidocaine viscous (GI COCKTAIL) oral susp 30 mL  30 mL Oral Once Chris Street M.D.       • D5W infusion   Intravenous Continuous Michael Gayle M.D.   Stopped at 01/09/19 2314   • dextrose 10% infusion   Intravenous Continuous Michael Gayle M.D. 125 mL/hr at 01/09/19 2314         Fluids    Intake/Output Summary (Last 24 hours) at 01/10/19 0644  Last data filed at 01/10/19 0600   Gross per 24 hour   Intake           8145.9 ml   Output             1850 ml   Net           6295.9 ml       Laboratory  Recent Labs      01/09/19   0830   HSRTP91C  7.16*   PVCGNX553L  11.9*   [id number]  218.2*   KOCX1MLY  98.3   ARTHCO3  4*   ARTBE  -22*         Recent Labs      01/09/19   0729   01/09/19   1640  01/09/19   2006  01/10/19   0108  01/10/19   0316   SODIUM  133*   < >  142  142  142  139   POTASSIUM  4.5   < >  4.7  3.9  3.5*  3.1*   CHLORIDE  103   < >  120*  121*  121*  119*   CO2  <5*   < >  <5*  6*  11*  11*   BUN  22   < >  17  13  14  14    CREATININE  0.79   < >  0.84  0.71  0.62  0.59   MAGNESIUM  1.9   --   2.5   --   1.9   --    PHOSPHORUS  3.9   --   3.1   --   1.1*   --    CALCIUM  9.6   < >  8.5  8.5  8.5  8.4*    < > = values in this interval not displayed.     Recent Labs      01/09/19   0729   01/09/19   2006  01/10/19   0108  01/10/19   0316   ALTSGPT  17   --    --   14   --    ASTSGOT  17   --    --   10*   --    ALKPHOSPHAT  86   --    --   70   --    TBILIRUBIN  0.5   --    --   0.5   --    GLUCOSE  433*   < >  197*  203*  211*    < > = values in this interval not displayed.     Recent Labs      01/09/19   0729  01/09/19   0918  01/10/19   0108   WBC   --   18.5*   --    NEUTSPOLYS   --   83.60*   --    LYMPHOCYTES   --   11.90*   --    MONOCYTES   --   2.40   --    EOSINOPHILS   --   0.10   --    BASOPHILS   --   0.50   --    ASTSGOT  17   --   10*   ALTSGPT  17   --   14   ALKPHOSPHAT  86   --   70   TBILIRUBIN  0.5   --   0.5     Recent Labs      01/09/19 0918   RBC  5.00   HEMOGLOBIN  14.9   HEMATOCRIT  45.0   PLATELETCT  267       Imaging  X-Ray:  I have personally reviewed the images and compared with prior images. and My impression is: no consolidations or edema  EKG:  I have personally reviewed the images and compared with prior images. and My impression is: no st/t changes for ischemia    Assessment/Plan  * Upper GI bleeding   Assessment & Plan    Changed to ppi bid  Likely josé miguel collazo given vomiting  No furthe rbleeding  Hemoglobin 11, did drop but likely this is dilutional  If further bleeding will discuss with gi or significant drop in hemoglobin     Diabetic ketoacidosis (HCC)- (present on admission)   Assessment & Plan    DKA protocol  Fluid boluses and maintenance.   Will give another liter of LR  Insulin gtt  Possible etiology is upper GI bleed. No other infectious source identified.   Severely hypovolemic  ? Compliance  Additional 4 L IVF LR and slow correction of bicarb  Finally corrected evening  Plan next chem  continued gap normal and bicarb 18 or greater then transion to home insulin and ok to feed         Metabolic acidosis- (present on admission)   Assessment & Plan    Secondary to dka and hypovolemia  Volume resuscitate  Required additional 4 L IVF LR today  Bicarb slowling improving  anioin gap improved  Remains on insulin drip         Type 1 diabetes mellitus (HCC)- (present on admission)   Assessment & Plan    ? Compliance  No significant signs infection  Gi bleeding but may be josé miguel collazo from vomiting  Diabetic education prior to discharge     Intractable nausea and vomiting- (present on admission)   Assessment & Plan    From DKA +/- gastroparesis  Zofran prn nausea  Improving with volume resuscittation     Gastroparesis- (present on admission)   Assessment & Plan    Due to dka  improving          VTE:  Contraindicated  Ulcer: PPI  Lines: None    I have performed a physical exam and reviewed and updated ROS and Plan today (1/10/2019). In review of yesterday's note (1/9/2019), there are no changes except as documented above.     Discussed patient condition and risk of morbidity and/or mortality with Hospitalist, Family, RN, RT, Pharmacy, , Charge nurse / hot rounds and Patient     The patient remains critically ill.  Critical care time = 34 minutes in directly providing and coordinating critical care and extensive data review.  She remained on insulin drip and required 4 L IVF LR per myself today and slowly correcting acidosis.  No time overlap and excludes procedures.

## 2019-01-10 NOTE — PROGRESS NOTES
Note to , family has been trying to help Alis control her sugars and were unaware that her control has been poor, hemoglobin a1C is very high, can we help the family by getting a psych consult, counseling? Patient has been on antidepressants in the past and that may be good for her. Home health as well?

## 2019-01-10 NOTE — PROGRESS NOTES
BMP reviewed with Dr Street, 1L LR bolus ordered for tachycardia which should also improve once the patient is not as acidotic. Anothre 12.5 of benadryl ordered for N/V, the patient is still awake

## 2019-01-11 LAB
ALBUMIN SERPL BCP-MCNC: 3.2 G/DL (ref 3.2–4.9)
ALBUMIN SERPL BCP-MCNC: 3.3 G/DL (ref 3.2–4.9)
ALBUMIN/GLOB SERPL: 1.8 G/DL
ALBUMIN/GLOB SERPL: 2 G/DL
ALP SERPL-CCNC: 55 U/L (ref 30–99)
ALP SERPL-CCNC: 57 U/L (ref 30–99)
ALT SERPL-CCNC: 13 U/L (ref 2–50)
ALT SERPL-CCNC: 14 U/L (ref 2–50)
ANION GAP SERPL CALC-SCNC: 11 MMOL/L (ref 0–11.9)
ANION GAP SERPL CALC-SCNC: 6 MMOL/L (ref 0–11.9)
ANION GAP SERPL CALC-SCNC: 7 MMOL/L (ref 0–11.9)
ANION GAP SERPL CALC-SCNC: 8 MMOL/L (ref 0–11.9)
ANION GAP SERPL CALC-SCNC: 9 MMOL/L (ref 0–11.9)
AST SERPL-CCNC: 18 U/L (ref 12–45)
AST SERPL-CCNC: 20 U/L (ref 12–45)
B-OH-BUTYR SERPL-MCNC: 0.18 MMOL/L (ref 0.02–0.27)
BASOPHILS # BLD AUTO: 0.4 % (ref 0–1.8)
BASOPHILS # BLD: 0.03 K/UL (ref 0–0.12)
BILIRUB SERPL-MCNC: 0.5 MG/DL (ref 0.1–1.5)
BILIRUB SERPL-MCNC: 0.5 MG/DL (ref 0.1–1.5)
BUN SERPL-MCNC: 3 MG/DL (ref 8–22)
BUN SERPL-MCNC: 3 MG/DL (ref 8–22)
BUN SERPL-MCNC: 4 MG/DL (ref 8–22)
BUN SERPL-MCNC: 4 MG/DL (ref 8–22)
BUN SERPL-MCNC: <3 MG/DL (ref 8–22)
CALCIUM SERPL-MCNC: 8.1 MG/DL (ref 8.5–10.5)
CALCIUM SERPL-MCNC: 8.1 MG/DL (ref 8.5–10.5)
CALCIUM SERPL-MCNC: 8.2 MG/DL (ref 8.5–10.5)
CALCIUM SERPL-MCNC: 8.2 MG/DL (ref 8.5–10.5)
CALCIUM SERPL-MCNC: 8.3 MG/DL (ref 8.5–10.5)
CHLORIDE SERPL-SCNC: 105 MMOL/L (ref 96–112)
CHLORIDE SERPL-SCNC: 107 MMOL/L (ref 96–112)
CHLORIDE SERPL-SCNC: 109 MMOL/L (ref 96–112)
CHLORIDE SERPL-SCNC: 110 MMOL/L (ref 96–112)
CHLORIDE SERPL-SCNC: 112 MMOL/L (ref 96–112)
CO2 SERPL-SCNC: 16 MMOL/L (ref 20–33)
CO2 SERPL-SCNC: 20 MMOL/L (ref 20–33)
CREAT SERPL-MCNC: 0.4 MG/DL (ref 0.5–1.4)
CREAT SERPL-MCNC: 0.44 MG/DL (ref 0.5–1.4)
CREAT SERPL-MCNC: 0.47 MG/DL (ref 0.5–1.4)
CREAT SERPL-MCNC: 0.51 MG/DL (ref 0.5–1.4)
CREAT SERPL-MCNC: 0.55 MG/DL (ref 0.5–1.4)
EOSINOPHIL # BLD AUTO: 0.09 K/UL (ref 0–0.51)
EOSINOPHIL NFR BLD: 1.1 % (ref 0–6.9)
ERYTHROCYTE [DISTWIDTH] IN BLOOD BY AUTOMATED COUNT: 42.3 FL (ref 35.9–50)
GLOBULIN SER CALC-MCNC: 1.6 G/DL (ref 1.9–3.5)
GLOBULIN SER CALC-MCNC: 1.8 G/DL (ref 1.9–3.5)
GLUCOSE BLD-MCNC: 104 MG/DL (ref 65–99)
GLUCOSE BLD-MCNC: 113 MG/DL (ref 65–99)
GLUCOSE BLD-MCNC: 115 MG/DL (ref 65–99)
GLUCOSE BLD-MCNC: 116 MG/DL (ref 65–99)
GLUCOSE BLD-MCNC: 117 MG/DL (ref 65–99)
GLUCOSE BLD-MCNC: 118 MG/DL (ref 65–99)
GLUCOSE BLD-MCNC: 119 MG/DL (ref 65–99)
GLUCOSE BLD-MCNC: 123 MG/DL (ref 65–99)
GLUCOSE BLD-MCNC: 125 MG/DL (ref 65–99)
GLUCOSE BLD-MCNC: 127 MG/DL (ref 65–99)
GLUCOSE BLD-MCNC: 128 MG/DL (ref 65–99)
GLUCOSE BLD-MCNC: 128 MG/DL (ref 65–99)
GLUCOSE BLD-MCNC: 129 MG/DL (ref 65–99)
GLUCOSE BLD-MCNC: 156 MG/DL (ref 65–99)
GLUCOSE BLD-MCNC: 314 MG/DL (ref 65–99)
GLUCOSE SERPL-MCNC: 133 MG/DL (ref 65–99)
GLUCOSE SERPL-MCNC: 135 MG/DL (ref 65–99)
GLUCOSE SERPL-MCNC: 137 MG/DL (ref 65–99)
GLUCOSE SERPL-MCNC: 138 MG/DL (ref 65–99)
GLUCOSE SERPL-MCNC: 355 MG/DL (ref 65–99)
HCT VFR BLD AUTO: 33.6 % (ref 37–47)
HGB BLD-MCNC: 11.5 G/DL (ref 12–16)
IMM GRANULOCYTES # BLD AUTO: 0.04 K/UL (ref 0–0.11)
IMM GRANULOCYTES NFR BLD AUTO: 0.5 % (ref 0–0.9)
LYMPHOCYTES # BLD AUTO: 2.26 K/UL (ref 1–4.8)
LYMPHOCYTES NFR BLD: 27.1 % (ref 22–41)
MAGNESIUM SERPL-MCNC: 1.4 MG/DL (ref 1.5–2.5)
MAGNESIUM SERPL-MCNC: 1.4 MG/DL (ref 1.5–2.5)
MCH RBC QN AUTO: 28.9 PG (ref 27–33)
MCHC RBC AUTO-ENTMCNC: 34.2 G/DL (ref 33.6–35)
MCV RBC AUTO: 84.4 FL (ref 81.4–97.8)
MONOCYTES # BLD AUTO: 0.62 K/UL (ref 0–0.85)
MONOCYTES NFR BLD AUTO: 7.4 % (ref 0–13.4)
NEUTROPHILS # BLD AUTO: 5.29 K/UL (ref 2–7.15)
NEUTROPHILS NFR BLD: 63.5 % (ref 44–72)
NRBC # BLD AUTO: 0 K/UL
NRBC BLD-RTO: 0 /100 WBC
PHOSPHATE SERPL-MCNC: 1.4 MG/DL (ref 2.5–4.5)
PHOSPHATE SERPL-MCNC: 1.5 MG/DL (ref 2.5–4.5)
PLATELET # BLD AUTO: 210 K/UL (ref 164–446)
PMV BLD AUTO: 9.8 FL (ref 9–12.9)
POTASSIUM SERPL-SCNC: 3 MMOL/L (ref 3.6–5.5)
POTASSIUM SERPL-SCNC: 3.1 MMOL/L (ref 3.6–5.5)
POTASSIUM SERPL-SCNC: 3.2 MMOL/L (ref 3.6–5.5)
POTASSIUM SERPL-SCNC: 3.6 MMOL/L (ref 3.6–5.5)
POTASSIUM SERPL-SCNC: 3.9 MMOL/L (ref 3.6–5.5)
PROT SERPL-MCNC: 4.8 G/DL (ref 6–8.2)
PROT SERPL-MCNC: 5.1 G/DL (ref 6–8.2)
RBC # BLD AUTO: 3.98 M/UL (ref 4.2–5.4)
SODIUM SERPL-SCNC: 134 MMOL/L (ref 135–145)
SODIUM SERPL-SCNC: 135 MMOL/L (ref 135–145)
SODIUM SERPL-SCNC: 135 MMOL/L (ref 135–145)
SODIUM SERPL-SCNC: 137 MMOL/L (ref 135–145)
SODIUM SERPL-SCNC: 139 MMOL/L (ref 135–145)
WBC # BLD AUTO: 8.3 K/UL (ref 4.8–10.8)

## 2019-01-11 PROCEDURE — C9113 INJ PANTOPRAZOLE SODIUM, VIA: HCPCS | Performed by: INTERNAL MEDICINE

## 2019-01-11 PROCEDURE — 700105 HCHG RX REV CODE 258: Performed by: INTERNAL MEDICINE

## 2019-01-11 PROCEDURE — 84100 ASSAY OF PHOSPHORUS: CPT

## 2019-01-11 PROCEDURE — 700111 HCHG RX REV CODE 636 W/ 250 OVERRIDE (IP): Performed by: INTERNAL MEDICINE

## 2019-01-11 PROCEDURE — 700102 HCHG RX REV CODE 250 W/ 637 OVERRIDE(OP): Performed by: INTERNAL MEDICINE

## 2019-01-11 PROCEDURE — 83735 ASSAY OF MAGNESIUM: CPT | Mod: 91

## 2019-01-11 PROCEDURE — 80048 BASIC METABOLIC PNL TOTAL CA: CPT | Mod: 91

## 2019-01-11 PROCEDURE — 82962 GLUCOSE BLOOD TEST: CPT | Mod: 91

## 2019-01-11 PROCEDURE — 80053 COMPREHEN METABOLIC PANEL: CPT | Mod: 91

## 2019-01-11 PROCEDURE — 99233 SBSQ HOSP IP/OBS HIGH 50: CPT | Performed by: HOSPITALIST

## 2019-01-11 PROCEDURE — 700111 HCHG RX REV CODE 636 W/ 250 OVERRIDE (IP): Performed by: HOSPITALIST

## 2019-01-11 PROCEDURE — 700101 HCHG RX REV CODE 250: Performed by: INTERNAL MEDICINE

## 2019-01-11 PROCEDURE — 770022 HCHG ROOM/CARE - ICU (200)

## 2019-01-11 PROCEDURE — 85025 COMPLETE CBC W/AUTO DIFF WBC: CPT

## 2019-01-11 PROCEDURE — 99291 CRITICAL CARE FIRST HOUR: CPT | Performed by: INTERNAL MEDICINE

## 2019-01-11 PROCEDURE — 82010 KETONE BODYS QUAN: CPT

## 2019-01-11 RX ORDER — POTASSIUM CHLORIDE 29.8 MG/ML
40 INJECTION INTRAVENOUS ONCE
Status: COMPLETED | OUTPATIENT
Start: 2019-01-11 | End: 2019-01-11

## 2019-01-11 RX ORDER — MAGNESIUM SULFATE HEPTAHYDRATE 40 MG/ML
4 INJECTION, SOLUTION INTRAVENOUS ONCE
Status: COMPLETED | OUTPATIENT
Start: 2019-01-11 | End: 2019-01-11

## 2019-01-11 RX ORDER — INSULIN GLARGINE 100 [IU]/ML
25 INJECTION, SOLUTION SUBCUTANEOUS
Status: DISCONTINUED | OUTPATIENT
Start: 2019-01-11 | End: 2019-01-12

## 2019-01-11 RX ORDER — OMEPRAZOLE 20 MG/1
20 CAPSULE, DELAYED RELEASE ORAL DAILY
Status: DISCONTINUED | OUTPATIENT
Start: 2019-01-12 | End: 2019-01-15 | Stop reason: HOSPADM

## 2019-01-11 RX ADMIN — MAGNESIUM SULFATE IN WATER 4 G: 40 INJECTION, SOLUTION INTRAVENOUS at 06:08

## 2019-01-11 RX ADMIN — HYDROMORPHONE HYDROCHLORIDE 0.5 MG: 1 INJECTION, SOLUTION INTRAMUSCULAR; INTRAVENOUS; SUBCUTANEOUS at 00:18

## 2019-01-11 RX ADMIN — HYDROMORPHONE HYDROCHLORIDE 0.5 MG: 1 INJECTION, SOLUTION INTRAMUSCULAR; INTRAVENOUS; SUBCUTANEOUS at 21:37

## 2019-01-11 RX ADMIN — INSULIN GLARGINE 25 UNITS: 100 INJECTION, SOLUTION SUBCUTANEOUS at 19:48

## 2019-01-11 RX ADMIN — DEXTROSE MONOHYDRATE: 100 INJECTION, SOLUTION INTRAVENOUS at 08:18

## 2019-01-11 RX ADMIN — POTASSIUM CHLORIDE 40 MEQ: 29.8 INJECTION, SOLUTION INTRAVENOUS at 14:28

## 2019-01-11 RX ADMIN — METOCLOPRAMIDE 10 MG: 5 INJECTION, SOLUTION INTRAMUSCULAR; INTRAVENOUS at 00:18

## 2019-01-11 RX ADMIN — HYDROMORPHONE HYDROCHLORIDE 0.5 MG: 1 INJECTION, SOLUTION INTRAMUSCULAR; INTRAVENOUS; SUBCUTANEOUS at 14:17

## 2019-01-11 RX ADMIN — PANTOPRAZOLE SODIUM 40 MG: 40 INJECTION, POWDER, LYOPHILIZED, FOR SOLUTION INTRAVENOUS at 04:19

## 2019-01-11 RX ADMIN — DEXTROSE MONOHYDRATE: 100 INJECTION, SOLUTION INTRAVENOUS at 16:26

## 2019-01-11 RX ADMIN — LORAZEPAM 0.5 MG: 2 INJECTION INTRAMUSCULAR at 20:01

## 2019-01-11 RX ADMIN — METOCLOPRAMIDE 10 MG: 5 INJECTION, SOLUTION INTRAMUSCULAR; INTRAVENOUS at 23:44

## 2019-01-11 RX ADMIN — INSULIN GLARGINE 25 UNITS: 100 INJECTION, SOLUTION SUBCUTANEOUS at 12:46

## 2019-01-11 RX ADMIN — HYDROMORPHONE HYDROCHLORIDE 0.5 MG: 1 INJECTION, SOLUTION INTRAMUSCULAR; INTRAVENOUS; SUBCUTANEOUS at 04:19

## 2019-01-11 RX ADMIN — METOCLOPRAMIDE 10 MG: 5 INJECTION, SOLUTION INTRAMUSCULAR; INTRAVENOUS at 17:27

## 2019-01-11 RX ADMIN — POTASSIUM CHLORIDE 40 MEQ: 29.8 INJECTION, SOLUTION INTRAVENOUS at 04:19

## 2019-01-11 RX ADMIN — METOCLOPRAMIDE 10 MG: 5 INJECTION, SOLUTION INTRAMUSCULAR; INTRAVENOUS at 04:19

## 2019-01-11 RX ADMIN — POTASSIUM PHOSPHATE, MONOBASIC AND POTASSIUM PHOSPHATE, DIBASIC 30 MMOL: 224; 236 INJECTION, SOLUTION INTRAVENOUS at 06:40

## 2019-01-11 RX ADMIN — METOCLOPRAMIDE 10 MG: 5 INJECTION, SOLUTION INTRAMUSCULAR; INTRAVENOUS at 11:00

## 2019-01-11 RX ADMIN — SODIUM CHLORIDE 4 UNITS/HR: 9 INJECTION, SOLUTION INTRAVENOUS at 13:33

## 2019-01-11 RX ADMIN — LORAZEPAM 0.5 MG: 2 INJECTION INTRAMUSCULAR at 02:04

## 2019-01-11 RX ADMIN — HYDROMORPHONE HYDROCHLORIDE 0.5 MG: 1 INJECTION, SOLUTION INTRAMUSCULAR; INTRAVENOUS; SUBCUTANEOUS at 10:59

## 2019-01-11 RX ADMIN — HYDROMORPHONE HYDROCHLORIDE 0.5 MG: 1 INJECTION, SOLUTION INTRAMUSCULAR; INTRAVENOUS; SUBCUTANEOUS at 17:40

## 2019-01-11 ASSESSMENT — ENCOUNTER SYMPTOMS
SENSORY CHANGE: 0
HEADACHES: 0
BRUISES/BLEEDS EASILY: 0
BLURRED VISION: 0
DIAPHORESIS: 1
DOUBLE VISION: 0
PALPITATIONS: 0
STRIDOR: 0
CHILLS: 0
FOCAL WEAKNESS: 0
DIZZINESS: 0
ROS GI COMMENTS: ABDOMINAL PAIN IMPROVED
NERVOUS/ANXIOUS: 0
ABDOMINAL PAIN: 1
NERVOUS/ANXIOUS: 1
BLOOD IN STOOL: 0
EYES NEGATIVE: 1
PHOTOPHOBIA: 0
FEVER: 0
SINUS PAIN: 0
COUGH: 0
MUSCULOSKELETAL NEGATIVE: 1
HEMOPTYSIS: 0
MYALGIAS: 0
TINGLING: 0
SHORTNESS OF BREATH: 0
SPEECH CHANGE: 0
SPUTUM PRODUCTION: 0
POLYDIPSIA: 0
NAUSEA: 1
BACK PAIN: 0
DEPRESSION: 0
CARDIOVASCULAR NEGATIVE: 1
NECK PAIN: 0
VOMITING: 1
WHEEZING: 0
HEARTBURN: 0
WEIGHT LOSS: 0

## 2019-01-11 ASSESSMENT — PAIN SCALES - GENERAL
PAINLEVEL_OUTOF10: 4
PAINLEVEL_OUTOF10: 3
PAINLEVEL_OUTOF10: 2
PAINLEVEL_OUTOF10: 4
PAINLEVEL_OUTOF10: 8
PAINLEVEL_OUTOF10: 5
PAINLEVEL_OUTOF10: 3
PAINLEVEL_OUTOF10: 4
PAINLEVEL_OUTOF10: 8
PAINLEVEL_OUTOF10: 3
PAINLEVEL_OUTOF10: 8

## 2019-01-11 NOTE — ASSESSMENT & PLAN NOTE
Recent diagnosis with significant prolonged gastric emptying time  On Reglan, erythromycin, Marinol  Mainstay will be aggressive glycemic control

## 2019-01-11 NOTE — DIETARY
Nutrition Services:  RD notes fish oil supplements taken BID at home, per nutrition screening report.  Not currently on MAR, nor indicated at this time while patient is hospitalized.      Diet Rx: Full Liquid/Diabetic    RD to re-screen per department policy in 7 days.  Please consult RD prn, if prior to re-screen date.

## 2019-01-11 NOTE — PROGRESS NOTES
[facility] Medicine Daily Progress Note    Date of Service  1/11/2019    Chief Complaint  28 y.o. female admitted 1/9/2019 with onset of nausea vomiting and admitted with DKA.  Reports some coffee-ground emesis after multiple episodes of nausea vomiting.  Admitted for aggressive insulin management.    Hospital Course    28-year-old with gastroparesis, diabetes type 1 admitted for DKA      Interval Problem Update  Patient seen and examined today. ICU Care  Care and plan discussed in IDT/Hot rounds.  Lines and assistive devices reviewed.    Patient tolerating treatment and therapies.  All Data, Medication data reviewed.  Case discussed with nursing as available.  Plan of Care reviewed with patient and notified of changes.  1/10 the patient remains on insulin protocol, received IV fluids, anion gap is improving but CO2 remains low, the patient continues to have nausea vomiting, recent diagnosis of gastroparesis, no further GI bleeding noted, hemoglobin stable.  1/11 patient still with significant nausea, unable to eat anything still, transition from insulin drip to long-acting insulin with glucose infusion ongoing while with poor p.o. intake, no further hematemesis or bleeding noted, no abdominal pain, feels overall better  Consultants/Specialty  Intensivist    Code Status  Full code    Disposition  ICU    Review of Systems  Review of Systems   Constitutional: Positive for malaise/fatigue. Negative for chills and fever.   HENT: Negative.    Eyes: Negative.    Respiratory: Negative for cough.    Cardiovascular: Negative.  Negative for chest pain and palpitations.   Gastrointestinal: Positive for nausea. Negative for heartburn.   Genitourinary: Negative.  Negative for dysuria and frequency.   Musculoskeletal: Negative.  Negative for back pain and neck pain.   Skin: Negative.  Negative for itching and rash.   Neurological: Negative for dizziness, focal weakness and headaches.   Endo/Heme/Allergies: Negative.  Negative for  polydipsia. Does not bruise/bleed easily.   Psychiatric/Behavioral: Negative for depression. The patient is nervous/anxious.         Physical Exam  Temp:  [36.4 °C (97.5 °F)-37.2 °C (99 °F)] 36.4 °C (97.5 °F)  Pulse:  [] 102  Resp:  [12-30] 25    Physical Exam   Constitutional: She is oriented to person, place, and time. She appears well-developed and well-nourished. She appears lethargic. She has a sickly appearance.   HENT:   Head: Normocephalic and atraumatic.   Nose: Nose normal.   Mouth/Throat: Oropharynx is clear and moist.   Eyes: Pupils are equal, round, and reactive to light. Conjunctivae and EOM are normal.   Neck: Normal range of motion. Neck supple. No JVD present. No thyromegaly present.   Cardiovascular: Normal rate, regular rhythm and normal heart sounds.  Exam reveals no gallop and no friction rub.    Pulses:       Dorsalis pedis pulses are 2+ on the right side, and 2+ on the left side.   Capillary refill <3 secs   Pulmonary/Chest: Effort normal and breath sounds normal. She has no wheezes. She has no rales.   Abdominal: Soft. Bowel sounds are normal. She exhibits no distension and no mass. There is tenderness. There is no rebound and no guarding.   Musculoskeletal: Normal range of motion. She exhibits no edema or tenderness.   Lymphadenopathy:     She has no cervical adenopathy.   Neurological: She is oriented to person, place, and time. She appears lethargic. No cranial nerve deficit.   Skin: Skin is warm and dry. She is not diaphoretic. No cyanosis. There is pallor.   Psychiatric: She has a normal mood and affect. Her behavior is normal.   Nursing note and vitals reviewed.      Fluids    Intake/Output Summary (Last 24 hours) at 01/11/19 0737  Last data filed at 01/11/19 0600   Gross per 24 hour   Intake          7883.76 ml   Output             5100 ml   Net          2783.76 ml       Laboratory  Recent Labs      01/09/19   0918  01/10/19   0315  01/11/19   0020   WBC  18.5*  9.8  8.3   RBC   5.00  4.03*  3.98*   HEMOGLOBIN  14.9  11.8*  11.5*   HEMATOCRIT  45.0  36.1*  33.6*   MCV  90.0  87.6  84.4   MCH  29.8  29.5  28.9   MCHC  33.1*  33.7  34.2   RDW  44.4  43.9  42.3   PLATELETCT  267  251  210   MPV  10.7  9.9  9.8     Recent Labs      01/11/19   0020  01/11/19   0255  01/11/19   0614   SODIUM  139  137  135   POTASSIUM  3.6  3.0*  3.1*   CHLORIDE  112  110  109   CO2  16*  20  20   GLUCOSE  135*  133*  138*   BUN  4*  4*  3*   CREATININE  0.47*  0.44*  0.40*   CALCIUM  8.3*  8.1*  8.2*             Recent Labs      01/10/19   0108   TRIGLYCERIDE  69   HDL  39*   LDL  73       Imaging  DX-CHEST-PORTABLE (1 VIEW)   Final Result      Right internal jugular line tip overlies the SVC. No pneumothorax.      [id number] VIEW   Final Result      Constipation pattern of the colon.      DX-CHEST-LIMITED (1 VIEW)   Final Result      No acute cardiopulmonary disease.           Assessment/Plan  * Upper GI bleeding   Assessment & Plan    On PPI  Possibly from nausea vomiting and esophageal tear  Monitor closely     Diabetic ketoacidosis (HCC)- (present on admission)   Assessment & Plan    ICU care  DKA protocol  Aggressive volume resuscitation  Titrate off insulin drip once the patient is able to take p.o.     Metabolic acidosis- (present on admission)   Assessment & Plan    Secondary to DKA  Adjusting  Monitor resuscitation     Epigastric abdominal pain- (present on admission)   Assessment & Plan    2/2 intractable vomiting / gastroparesis / DKA related   Plan as above in N/V  GI consultation if persistent N/V and drop in H&H / hematemesis evident   Check KUB / CXRAY   IV dilaudid for pain control at this time      Type 1 diabetes mellitus (HCC)- (present on admission)   Assessment & Plan    In DKA  Might benefit from diabetic education     Intractable nausea and vomiting- (present on admission)   Assessment & Plan    Appears related to DKA / diabetic gastroparesis  C/O coffee ground emesis  IV protonix 80 mg  followed by 8 mg / hour   If hematemesis recurrent, GI consultation  X 1 Zofran / IV Reglan scheduled   GI cocktail / Sucralfate  Emesis control  Mainstay will be glycemic control long-term     Gastroparesis- (present on admission)   Assessment & Plan    Recent diagnosis with significant prolonged gastric emptying time  On Reglan  Mainstay will be aggressive glycemic control     Hyponatremia- (present on admission)   Assessment & Plan    Pseudohyponatremia / Hypovolemic  Continue medical management and monitor      Essential hypertension- (present on admission)   Assessment & Plan    Will resume PO regimen when clinically better     Dyslipidemia- (present on admission)   Assessment & Plan    Resume oral regimen once able to tolerate     Leukemoid reaction- (present on admission)   Assessment & Plan    Reactive from DKA  Monitor - no infectious concerns apparent at this time  UA pending  Will check CXRAY      Plan  Continue with insulin drip until patient able to eat p.o. Nutrition  Additional medications ordered for emesis control  Follow electrolytes closely  Continue with IV hydration  Symptom control  Follow labs closely  See orders  Critically ill  I have performed a physical exam and reviewed and updated ROS and Plan today . In review of yesterday's note , there are no changes except as documented above.     VTE prophylaxis: Contraindicated chemically, SCD

## 2019-01-11 NOTE — CARE PLAN
Problem: Mobility  Goal: Risk for activity intolerance will decrease  Outcome: PROGRESSING AS EXPECTED  Up to chair for 2 hours with standby assist

## 2019-01-11 NOTE — PROGRESS NOTES
Spoke to Kostas in pharmacy in regards to pt's lab, pt continue nausea, and possibly transitioning off DKA. Kostas spoke to Dr. Gayle, plan is to keep pt on DKA protocol and check labs again at 6am, Dr Gayle would like to defer to day team about transitioning off.

## 2019-01-11 NOTE — CARE PLAN
Problem: Pain Management  Goal: Pain level will decrease to patient's comfort goal    Intervention: Follow pain managment plan developed in collaboration with patient and Interdisciplinary Team  Pt able to communicate pain, will administer medications PRN according to MAR.      Problem: Mobility  Goal: Risk for activity intolerance will decrease    Intervention: Encourage patient to increase activity level in collaboration with Interdisciplinary Team  Pt encouraged to mobilize to chair, pt complaining of N/V. Medications administered, will attempt to mobilize later this evening.

## 2019-01-11 NOTE — ASSESSMENT & PLAN NOTE
With recurrent DKA secondary to poor compliance, brittle diabetes  Continue long and short acting insulin at current dosage  Diabetic education provided, social work evaluated for resources

## 2019-01-11 NOTE — PROGRESS NOTES
Labs sent down at 0020, no results as of 0140. Called lab about results, tech is trying to locate specimen despite it showing labs in process in EPIC. Called lab multiple times in regards to specimen, still unable to locate specimen at 0245. New specimen sent STAT

## 2019-01-11 NOTE — PROGRESS NOTES
Discussed labs with Dr. Gomez, will check one more BMP at 0000 and if CO2 is greater than 18 will transition pt off DKA protocol.

## 2019-01-11 NOTE — PROGRESS NOTES
Critical Care Progress Note    Date of admission  1/9/2019    Chief Complaint  28 y.o. female with hx of DM type 1 c/b neuropathy, retinopathy, and gastroparesis, hx of recurrent hospitalization for DKA recently in 12/2018 who now came back to ED after been having nausea and vomiting. Pt noted coffee ground emesis. At ED, pt was not hypotensive, Hgb 13-14s, BG in 400s, + beta hydroxybutyrate, pH 7.16. Protonix gtt started. 3L fluid boluses given. Insulin gtt started. No hypotension or tachycardia. Pt is alert, oriented. Appears ill.     Hospital Course    1/9 admitted for dka      Interval Problem Update  Reviewed last 24 hour events:  d10  Insulin drip currently, weaning as tolerated  Poor po intake due to nausea  Bicarb droped overnight  Change to po ppi  No bleeding  See if can eat  lantus 25 bid once starting due to poor intake, on 30 bid at home  Frequent assessment of chemistry    Review of Systems  Review of Systems   Constitutional: Positive for diaphoresis and malaise/fatigue. Negative for chills, fever and weight loss.   HENT: Negative for congestion and sinus pain.    Eyes: Negative for blurred vision, double vision and photophobia.   Respiratory: Negative for cough, hemoptysis, sputum production, shortness of breath, wheezing and stridor.    Cardiovascular: Negative for chest pain, palpitations and leg swelling.   Gastrointestinal: Positive for abdominal pain, nausea and vomiting. Negative for blood in stool, heartburn and melena.        Abdominal pain improved   Genitourinary: Negative for dysuria and urgency.   Musculoskeletal: Negative for back pain, myalgias and neck pain.   Skin: Negative for itching and rash.   Neurological: Negative for dizziness, tingling, sensory change, speech change, focal weakness and headaches.   Endo/Heme/Allergies: Negative for polydipsia. Does not bruise/bleed easily.   Psychiatric/Behavioral: Negative for depression. The patient is not nervous/anxious.         Vital  Signs for last 24 hours   Temp:  [36.4 °C (97.5 °F)-37.2 °C (99 °F)] 36.5 °C (97.7 °F)  Pulse:  [] 103  Resp:  [12-30] 18    Hemodynamic parameters for last 24 hours       Respiratory       Physical Exam   Physical Exam   Constitutional: She is oriented to person, place, and time. She appears distressed.   Ill appearing   HENT:   Head: Normocephalic and atraumatic.   Right Ear: External ear normal.   Left Ear: External ear normal.   Mouth/Throat: No oropharyngeal exudate.   Eyes: Pupils are equal, round, and reactive to light. Conjunctivae and EOM are normal. Right eye exhibits no discharge. Left eye exhibits no discharge.   Neck: No JVD present. No tracheal deviation present.   Cardiovascular: Normal rate, regular rhythm and normal heart sounds.    No murmur heard.  Pulmonary/Chest: Effort normal and breath sounds normal. No stridor. No respiratory distress. She has no wheezes. She has no rales. She exhibits no tenderness.   Abdominal: She exhibits no mass. There is tenderness. There is no rebound and no guarding.   imporved from overnight, now mild   Musculoskeletal: She exhibits no edema, tenderness or deformity.   Neurological: She is alert and oriented to person, place, and time. She displays normal reflexes. No cranial nerve deficit. Coordination normal.   Skin: Skin is warm. No rash noted. She is diaphoretic. No erythema.   Psychiatric:   Fatigued, lethargic       Medications  Current Facility-Administered Medications   Medication Dose Route Frequency Provider Last Rate Last Dose   • potassium phosphates 30 mmol in  mL ivpb  30 mmol Intravenous Once Michael Gayle M.D. 125 mL/hr at 01/11/19 0640 30 mmol at 01/11/19 0640   • pantoprazole (PROTONIX) injection 40 mg  40 mg Intravenous BID Aníbal Gomez M.D.   40 mg at 01/11/19 0418   • LORazepam (ATIVAN) injection 0.5-1 mg  0.5-1 mg Intravenous Q4HRS PRN Sheng Campuzano M.D.   0.5 mg at 01/11/19 0204   • prochlorperazine (COMPAZINE)  suppository 25 mg  25 mg Rectal Q12HRS PRN Sheng Campuzano M.D.       • prochlorperazine (COMPAZINE) tablet 10 mg  10 mg Oral Q6HRS PRN Sheng Campuzano M.D.       • MD ALERT-PHARMACY TO CONSULT FOR DKA MONITORING 1 Each  1 Each Other PRN Chris Street M.D.       • Adult DKA potassium(K+) replacement scale  1 Each Intravenous Q4HRS Chris Street M.D.   Stopped at 01/11/19 0600   • metoclopramide (REGLAN) injection 10 mg  10 mg Intravenous Q6HRS Chris Street M.D.   10 mg at 01/11/19 0419   • HYDROmorphone pf (DILAUDID) injection 0.5 mg  0.5 mg Intravenous Q3HRS PRN Chris Street M.D.   0.5 mg at 01/11/19 0419   • senna-docusate (PERICOLACE or SENOKOT S) 8.6-50 MG per tablet 2 Tab  2 Tab Oral BID Chris Street M.D.   Stopped at 01/10/19 0600    And   • polyethylene glycol/lytes (MIRALAX) PACKET 1 Packet  1 Packet Oral QDAY PRN Chris Street M.D.        And   • magnesium hydroxide (MILK OF MAGNESIA) suspension 30 mL  30 mL Oral QDAY PRN Chris Street M.D.        And   • bisacodyl (DULCOLAX) suppository 10 mg  10 mg Rectal QDAY PRN Chris Street M.D.       • Respiratory Care per Protocol   Nebulization Continuous RT Chris Street M.D.       • ondansetron (ZOFRAN) syringe/vial injection 4 mg  4 mg Intravenous Q4HRS PRN Chris Street M.D.   4 mg at 01/10/19 2234   • ondansetron (ZOFRAN ODT) dispertab 4 mg  4 mg Oral Q4HRS PRN Chris Street M.D.   4 mg at 01/09/19 1610   • acetaminophen (TYLENOL) tablet 650 mg  650 mg Oral Q6HRS PRN Chris Street M.D.       • insulin regular human (HUMULIN/NOVOLIN R) 62.5 Units in  mL Infusion for DKA  4 Units/hr Intravenous Continuous Chris Street M.D. 16 mL/hr at 01/11/19 0819 4 Units/hr at 01/11/19 0819   • dextrose 10% infusion   Intravenous Continuous Michael Gayle M.D. 125 mL/hr at 01/11/19 0818         Fluids    Intake/Output Summary (Last 24 hours) at 01/11/19 1016  Last data filed at 01/11/19 0800   Gross per 24 hour   Intake          5883.76 ml   Output              5200 ml   Net           683.76 ml       Laboratory  Recent Labs      01/09/19   0830   ENBEL86P  7.16*   [id number]  11.9*   [id number]  218.2*   [id number]  98.3   ARTHCO3  4*   ARTBE  -22*         Recent Labs      01/10/19   0928   01/11/19   0020  01/11/19 0255 01/11/19   0614   SODIUM  140   < >  139  137  135   POTASSIUM  3.5*   < >  3.6  3.0*  3.1*   CHLORIDE  118*   < >  112  110  109   CO2  15*   < >  16*  20  20   BUN  12   < >  4*  4*  3*   CREATININE  0.47*   < >  0.47*  0.44*  0.40*   MAGNESIUM  1.8   --   1.4*  1.4*   --    PHOSPHORUS  2.9   --   1.4*  1.5*   --    CALCIUM  7.8*   < >  8.3*  8.1*  8.2*    < > = values in this interval not displayed.     Recent Labs      01/10/19   0108   01/11/19   0020  01/11/19   0255  01/11/19   0614   ALTSGPT  14   --   13  14   --    ASTSGOT  10*   --   20  18   --    ALKPHOSPHAT  70   --   57  55   --    TBILIRUBIN  0.5   --   0.5  0.5   --    GLUCOSE  203*   < >  135*  133*  138*    < > = values in this interval not displayed.     Recent Labs      01/09/19   0918  01/10/19   0108  01/10/19   0315  01/11/19 0020 01/11/19 0255   WBC  18.5*   --   9.8  8.3   --    NEUTSPOLYS  83.60*   --   76.90*  63.50   --    LYMPHOCYTES  11.90*   --   13.60*  27.10   --    MONOCYTES  2.40   --   8.10  7.40   --    EOSINOPHILS  0.10   --   0.10  1.10   --    BASOPHILS  0.50   --   0.20  0.40   --    ASTSGOT   --   10*   --   20  18   ALTSGPT   --   14   --   13  14   ALKPHOSPHAT   --   70   --   57  55   TBILIRUBIN   --   0.5   --   0.5  0.5     Recent Labs      01/09/19   0918  01/10/19   0315  01/11/19   0020   RBC  5.00  4.03*  3.98*   HEMOGLOBIN  14.9  11.8*  11.5*   HEMATOCRIT  45.0  36.1*  33.6*   PLATELETCT  267  251  210       Imaging  X-Ray:  I have personally reviewed the images and compared with prior images. and My impression is: no consolidations or edema  EKG:  I have personally reviewed the images and compared with prior images. and My impression is: no st/t  changes for ischemia    Assessment/Plan  * Upper GI bleeding   Assessment & Plan    Changed to ppi bid  Likely josé miguel collazo given vomiting  If further bleeding will discuss with gi or significant drop in hemoglobin  No further bleeding  Stable hg     Diabetic ketoacidosis (HCC)- (present on admission)   Assessment & Plan    DKA protocol  Fluid boluses and maintenance.   Will give another liter of LR  Insulin gtt  Possible etiology is upper GI bleed. No other infectious source identified.   Severely hypovolemic  ? Compliance  Additional 4 L IVF LR and slow correction of bicarb 1/10  Unable to tolerate po intake overnight and cont acidosis  Transitioning over course of day to lantus, start lower dose than home due to poor po intake       Metabolic acidosis- (present on admission)   Assessment & Plan    Secondary to dka and hypovolemia  Volume resuscitate  Required additional 4 L IVF LR today  Bicarb slowling improving  anioin gap improved  Remains on insulin drip  Weaning as tolerated  Poor po intake  Transition today as tolerated  Received > 15 L IVF resuscitation since admission  Adequate urinarly output         Type 1 diabetes mellitus (HCC)- (present on admission)   Assessment & Plan    ? Compliance  No significant signs infection  Gi bleeding but may be josé miguel collazo from vomiting  Diabetic education prior to discharge     Intractable nausea and vomiting- (present on admission)   Assessment & Plan    From DKA +/- gastroparesis  Zofran prn nausea  Improving with volume resuscittation     Gastroparesis- (present on admission)   Assessment & Plan    Due to dka  improving          VTE:  Contraindicated  Ulcer: PPI  Lines: None    I have performed a physical exam and reviewed and updated ROS and Plan today (1/11/2019). In review of yesterday's note (1/10/2019), there are no changes except as documented above.     Discussed patient condition and risk of morbidity and/or mortality with Hospitalist, Family, RN, RT,  Pharmacy, , Charge nurse / hot rounds and Patient     The patient remains critically ill.  Continued on insulin drip today with close monitoring for transition as tolerated to sq insulin .  Critical care time = 32 minutes in directly providing and coordinating critical care and extensive data review.  No time overlap and excludes procedures.

## 2019-01-12 ENCOUNTER — APPOINTMENT (OUTPATIENT)
Dept: RADIOLOGY | Facility: MEDICAL CENTER | Age: 29
DRG: 637 | End: 2019-01-12
Attending: INTERNAL MEDICINE
Payer: MEDICAID

## 2019-01-12 ENCOUNTER — APPOINTMENT (OUTPATIENT)
Dept: RADIOLOGY | Facility: MEDICAL CENTER | Age: 29
DRG: 637 | End: 2019-01-12
Attending: HOSPITALIST
Payer: MEDICAID

## 2019-01-12 PROBLEM — R10.13 EPIGASTRIC ABDOMINAL PAIN: Status: RESOLVED | Noted: 2019-01-09 | Resolved: 2019-01-12

## 2019-01-12 LAB
ANION GAP SERPL CALC-SCNC: 8 MMOL/L (ref 0–11.9)
BUN SERPL-MCNC: <3 MG/DL (ref 8–22)
CALCIUM SERPL-MCNC: 8.4 MG/DL (ref 8.5–10.5)
CHLORIDE SERPL-SCNC: 104 MMOL/L (ref 96–112)
CO2 SERPL-SCNC: 23 MMOL/L (ref 20–33)
CREAT SERPL-MCNC: 0.49 MG/DL (ref 0.5–1.4)
GLUCOSE BLD-MCNC: 101 MG/DL (ref 65–99)
GLUCOSE BLD-MCNC: 124 MG/DL (ref 65–99)
GLUCOSE BLD-MCNC: 158 MG/DL (ref 65–99)
GLUCOSE BLD-MCNC: 250 MG/DL (ref 65–99)
GLUCOSE BLD-MCNC: 311 MG/DL (ref 65–99)
GLUCOSE SERPL-MCNC: 290 MG/DL (ref 65–99)
POTASSIUM SERPL-SCNC: 3.3 MMOL/L (ref 3.6–5.5)
SODIUM SERPL-SCNC: 135 MMOL/L (ref 135–145)

## 2019-01-12 PROCEDURE — 700111 HCHG RX REV CODE 636 W/ 250 OVERRIDE (IP): Performed by: INTERNAL MEDICINE

## 2019-01-12 PROCEDURE — 99233 SBSQ HOSP IP/OBS HIGH 50: CPT | Performed by: HOSPITALIST

## 2019-01-12 PROCEDURE — A9270 NON-COVERED ITEM OR SERVICE: HCPCS | Performed by: INTERNAL MEDICINE

## 2019-01-12 PROCEDURE — 700102 HCHG RX REV CODE 250 W/ 637 OVERRIDE(OP): Performed by: HOSPITALIST

## 2019-01-12 PROCEDURE — 700105 HCHG RX REV CODE 258: Performed by: INTERNAL MEDICINE

## 2019-01-12 PROCEDURE — A9270 NON-COVERED ITEM OR SERVICE: HCPCS | Performed by: HOSPITALIST

## 2019-01-12 PROCEDURE — 71045 X-RAY EXAM CHEST 1 VIEW: CPT

## 2019-01-12 PROCEDURE — 99233 SBSQ HOSP IP/OBS HIGH 50: CPT | Performed by: INTERNAL MEDICINE

## 2019-01-12 PROCEDURE — 05HC33Z INSERTION OF INFUSION DEVICE INTO LEFT BASILIC VEIN, PERCUTANEOUS APPROACH: ICD-10-PCS | Performed by: INTERNAL MEDICINE

## 2019-01-12 PROCEDURE — 82962 GLUCOSE BLOOD TEST: CPT | Mod: 91

## 2019-01-12 PROCEDURE — 80048 BASIC METABOLIC PNL TOTAL CA: CPT

## 2019-01-12 PROCEDURE — B54NZZA ULTRASONOGRAPHY OF LEFT UPPER EXTREMITY VEINS, GUIDANCE: ICD-10-PCS | Performed by: INTERNAL MEDICINE

## 2019-01-12 PROCEDURE — C1751 CATH, INF, PER/CENT/MIDLINE: HCPCS

## 2019-01-12 PROCEDURE — 770022 HCHG ROOM/CARE - ICU (200)

## 2019-01-12 PROCEDURE — 700102 HCHG RX REV CODE 250 W/ 637 OVERRIDE(OP): Performed by: INTERNAL MEDICINE

## 2019-01-12 PROCEDURE — 76700 US EXAM ABDOM COMPLETE: CPT

## 2019-01-12 PROCEDURE — 700111 HCHG RX REV CODE 636 W/ 250 OVERRIDE (IP): Performed by: HOSPITALIST

## 2019-01-12 RX ORDER — DEXTROSE MONOHYDRATE 50 MG/ML
INJECTION, SOLUTION INTRAVENOUS CONTINUOUS
Status: DISCONTINUED | OUTPATIENT
Start: 2019-01-12 | End: 2019-01-12

## 2019-01-12 RX ORDER — ERYTHROMYCIN ETHYLSUCCINATE 200 MG/5ML
200 SUSPENSION ORAL EVERY 6 HOURS
Status: DISCONTINUED | OUTPATIENT
Start: 2019-01-12 | End: 2019-01-14

## 2019-01-12 RX ORDER — DEXTROSE MONOHYDRATE 25 G/50ML
25 INJECTION, SOLUTION INTRAVENOUS
Status: DISCONTINUED | OUTPATIENT
Start: 2019-01-12 | End: 2019-01-15 | Stop reason: HOSPADM

## 2019-01-12 RX ORDER — INSULIN GLARGINE 100 [IU]/ML
30 INJECTION, SOLUTION SUBCUTANEOUS
Status: DISCONTINUED | OUTPATIENT
Start: 2019-01-12 | End: 2019-01-13

## 2019-01-12 RX ORDER — DEXTROSE, SODIUM CHLORIDE, SODIUM LACTATE, POTASSIUM CHLORIDE, AND CALCIUM CHLORIDE 5; .6; .31; .03; .02 G/100ML; G/100ML; G/100ML; G/100ML; G/100ML
INJECTION, SOLUTION INTRAVENOUS CONTINUOUS
Status: DISCONTINUED | OUTPATIENT
Start: 2019-01-12 | End: 2019-01-14

## 2019-01-12 RX ORDER — POTASSIUM CHLORIDE 29.8 MG/ML
40 INJECTION INTRAVENOUS ONCE
Status: COMPLETED | OUTPATIENT
Start: 2019-01-12 | End: 2019-01-12

## 2019-01-12 RX ADMIN — POTASSIUM CHLORIDE 40 MEQ: 29.8 INJECTION, SOLUTION INTRAVENOUS at 09:09

## 2019-01-12 RX ADMIN — INSULIN GLARGINE 30 UNITS: 100 INJECTION, SOLUTION SUBCUTANEOUS at 17:47

## 2019-01-12 RX ADMIN — HYDROMORPHONE HYDROCHLORIDE 0.5 MG: 1 INJECTION, SOLUTION INTRAMUSCULAR; INTRAVENOUS; SUBCUTANEOUS at 05:28

## 2019-01-12 RX ADMIN — METOCLOPRAMIDE 10 MG: 5 INJECTION, SOLUTION INTRAMUSCULAR; INTRAVENOUS at 17:47

## 2019-01-12 RX ADMIN — ERYTHROMYCIN ETHYLSUCCINATE 200 MG: 200 SUSPENSION ORAL at 20:31

## 2019-01-12 RX ADMIN — METOCLOPRAMIDE 10 MG: 5 INJECTION, SOLUTION INTRAMUSCULAR; INTRAVENOUS at 12:06

## 2019-01-12 RX ADMIN — SODIUM CHLORIDE, SODIUM LACTATE, POTASSIUM CHLORIDE, CALCIUM CHLORIDE AND DEXTROSE MONOHYDRATE: 5; 600; 310; 30; 20 INJECTION, SOLUTION INTRAVENOUS at 20:31

## 2019-01-12 RX ADMIN — DEXTROSE MONOHYDRATE: 100 INJECTION, SOLUTION INTRAVENOUS at 00:05

## 2019-01-12 RX ADMIN — LORAZEPAM 0.5 MG: 2 INJECTION INTRAMUSCULAR at 00:04

## 2019-01-12 RX ADMIN — HYDROMORPHONE HYDROCHLORIDE 0.5 MG: 1 INJECTION, SOLUTION INTRAMUSCULAR; INTRAVENOUS; SUBCUTANEOUS at 19:48

## 2019-01-12 RX ADMIN — HYDROMORPHONE HYDROCHLORIDE 0.5 MG: 1 INJECTION, SOLUTION INTRAMUSCULAR; INTRAVENOUS; SUBCUTANEOUS at 14:28

## 2019-01-12 RX ADMIN — METOCLOPRAMIDE 10 MG: 5 INJECTION, SOLUTION INTRAMUSCULAR; INTRAVENOUS at 05:27

## 2019-01-12 RX ADMIN — HYDROMORPHONE HYDROCHLORIDE 0.5 MG: 1 INJECTION, SOLUTION INTRAMUSCULAR; INTRAVENOUS; SUBCUTANEOUS at 02:12

## 2019-01-12 RX ADMIN — INSULIN GLARGINE 25 UNITS: 100 INJECTION, SOLUTION SUBCUTANEOUS at 05:36

## 2019-01-12 RX ADMIN — LORAZEPAM 1 MG: 2 INJECTION INTRAMUSCULAR at 17:56

## 2019-01-12 RX ADMIN — SODIUM CHLORIDE, SODIUM LACTATE, POTASSIUM CHLORIDE, CALCIUM CHLORIDE AND DEXTROSE MONOHYDRATE: 5; 600; 310; 30; 20 INJECTION, SOLUTION INTRAVENOUS at 09:45

## 2019-01-12 RX ADMIN — OMEPRAZOLE 20 MG: 20 CAPSULE, DELAYED RELEASE ORAL at 05:28

## 2019-01-12 RX ADMIN — LORAZEPAM 1 MG: 2 INJECTION INTRAMUSCULAR at 07:50

## 2019-01-12 RX ADMIN — INSULIN HUMAN 3 UNITS: 100 INJECTION, SOLUTION PARENTERAL at 13:22

## 2019-01-12 ASSESSMENT — PAIN SCALES - GENERAL
PAINLEVEL_OUTOF10: 8
PAINLEVEL_OUTOF10: 5
PAINLEVEL_OUTOF10: 8
PAINLEVEL_OUTOF10: 2
PAINLEVEL_OUTOF10: 0
PAINLEVEL_OUTOF10: 4
PAINLEVEL_OUTOF10: 2
PAINLEVEL_OUTOF10: 5
PAINLEVEL_OUTOF10: 0
PAINLEVEL_OUTOF10: 2
PAINLEVEL_OUTOF10: 4
PAINLEVEL_OUTOF10: 6

## 2019-01-12 ASSESSMENT — ENCOUNTER SYMPTOMS
MYALGIAS: 0
FOCAL WEAKNESS: 0
STRIDOR: 0
DEPRESSION: 0
PALPITATIONS: 0
DOUBLE VISION: 0
NERVOUS/ANXIOUS: 1
PHOTOPHOBIA: 0
BLOOD IN STOOL: 0
SPEECH CHANGE: 0
NECK PAIN: 0
ABDOMINAL PAIN: 1
BLURRED VISION: 0
CARDIOVASCULAR NEGATIVE: 1
HEMOPTYSIS: 0
WHEEZING: 0
FEVER: 0
DIZZINESS: 0
SHORTNESS OF BREATH: 0
MUSCULOSKELETAL NEGATIVE: 1
BACK PAIN: 0
SENSORY CHANGE: 0
WEIGHT LOSS: 0
NAUSEA: 1
SPUTUM PRODUCTION: 0
SINUS PAIN: 0
TINGLING: 0
HEARTBURN: 0
HEADACHES: 0
EYES NEGATIVE: 1
BRUISES/BLEEDS EASILY: 0
DIAPHORESIS: 0
COUGH: 0
POLYDIPSIA: 0
NERVOUS/ANXIOUS: 0
VOMITING: 0
CHILLS: 0

## 2019-01-12 NOTE — CARE PLAN
Problem: Infection  Goal: Will remain free from infection    Intervention: Implement standard precautions and perform hand washing before and after patient contact  Handwashing performed before and after pt contact.       Problem: Mobility  Goal: Risk for activity intolerance will decrease    Intervention: Assess and monitor signs of activity intolerance  Pt mobilized to commode multiple times today as a standby assist.

## 2019-01-12 NOTE — PROCEDURES
Vascular Access Team    Date of Insertion: 1/12/19  Arm Circumference: n/a  Line Length: 10  Line Size: 18  Vein Occupancy %: 39  Reason for Midline: Access  Labs: WBC 8.3, , BUN ,3, Cr 0.49, GFR >60,     Orders confirmed, vessel patency confirmed with ultrasound. Risks and benefits of procedure explained to patient and education regarding line associated bloodstream infections provided. Questions answered.     PowerGlide Midline placed in LUE per MD order with ultrasound guidance. 18g, 10 cm line placed in basilic vein after 1 attempt(s).  Catheter inserted with good blood return. Secured with 0cm external from insertion site.  Flushed without resistance with 10 mL 0.9% normal saline. Midline secured with Biopatch and Tegaderm.     Midline placement is confirmed by nurse using ultrasound and ability to flush and draw blood. Midline is appropriate for use at this time.  No X-ray is needed for placement confirmation. Pt tolerated procedure well.  Patient condition relayed to unit RN or ordering physician via this post procedure note in the EMR.    Ultrasound images uploaded to PACS and viewable in the EMR - yes  Ultrasound imaged printed and placed in paper chart - no      BARD PowerGlide Midline ref # [id number], Lot # [id number]

## 2019-01-12 NOTE — PROGRESS NOTES
[facility] Medicine Daily Progress Note    Date of Service  1/12/2019    Chief Complaint  28 y.o. female admitted 1/9/2019 with onset of nausea vomiting and admitted with DKA.  Reports some coffee-ground emesis after multiple episodes of nausea vomiting.  Admitted for aggressive insulin management.    Hospital Course    28-year-old with gastroparesis, diabetes type 1 admitted for DKA      Interval Problem Update  Patient seen and examined today. ICU Care  Care and plan discussed in IDT/Hot rounds.  Lines and assistive devices reviewed.    Patient tolerating treatment and therapies.  All Data, Medication data reviewed.  Case discussed with nursing as available.  Plan of Care reviewed with patient and notified of changes.  1/10 the patient remains on insulin protocol, received IV fluids, anion gap is improving but CO2 remains low, the patient continues to have nausea vomiting, recent diagnosis of gastroparesis, no further GI bleeding noted, hemoglobin stable.  1/11 patient still with significant nausea, unable to eat anything still, transition from insulin drip to long-acting insulin with glucose infusion ongoing while with poor p.o. intake, no further hematemesis or bleeding noted, no abdominal pain, feels overall better  1/12 patient still with nausea and poor p.o. intake, has received significant amount of fluid, continues on dextrose drip for glycemic support while on long-acting insulin, seems lethargic and poorly motivated, is trying to eat, started on erythromycin for prokinetic purposes, getting ultrasound of the abdomen to rule out abnormality of the renal system and gallbladder possibly  Consultants/Specialty  Intensivist    Code Status  Full code    Disposition  ICU    Review of Systems  Review of Systems   Constitutional: Positive for malaise/fatigue. Negative for chills and fever.   HENT: Negative.    Eyes: Negative.    Respiratory: Negative for cough.    Cardiovascular: Negative.  Negative for chest pain  and palpitations.   Gastrointestinal: Positive for nausea. Negative for heartburn.   Genitourinary: Negative.  Negative for dysuria and frequency.   Musculoskeletal: Negative.  Negative for back pain and neck pain.   Skin: Negative.  Negative for itching and rash.   Neurological: Negative for dizziness, focal weakness and headaches.   Endo/Heme/Allergies: Negative.  Negative for polydipsia. Does not bruise/bleed easily.   Psychiatric/Behavioral: Negative for depression. The patient is nervous/anxious.         Physical Exam  Temp:  [36.3 °C (97.3 °F)-36.9 °C (98.4 °F)] 36.3 °C (97.4 °F)  Pulse:  [] 95  Resp:  [16-32] 19    Physical Exam   Constitutional: She is oriented to person, place, and time. She appears well-developed and well-nourished. She appears lethargic. She has a sickly appearance.   HENT:   Head: Normocephalic and atraumatic.   Nose: Nose normal.   Mouth/Throat: Oropharynx is clear and moist.   Eyes: Pupils are equal, round, and reactive to light. Conjunctivae and EOM are normal.   Neck: Normal range of motion. Neck supple. No JVD present. No thyromegaly present.   Cardiovascular: Normal rate, regular rhythm and normal heart sounds.  Exam reveals no gallop and no friction rub.    Pulses:       Dorsalis pedis pulses are 2+ on the right side, and 2+ on the left side.   Capillary refill <3 secs   Pulmonary/Chest: Effort normal and breath sounds normal. She has no wheezes. She has no rales.   Abdominal: Soft. Bowel sounds are normal. She exhibits no distension and no mass. There is tenderness. There is no rebound and no guarding.   Musculoskeletal: Normal range of motion. She exhibits no edema or tenderness.   Lymphadenopathy:     She has no cervical adenopathy.   Neurological: She is oriented to person, place, and time. She appears lethargic. No cranial nerve deficit.   Skin: Skin is warm and dry. She is not diaphoretic. No cyanosis. There is pallor.   Psychiatric: She has a normal mood and affect.  Her behavior is normal.   Nursing note and vitals reviewed.      Fluids    Intake/Output Summary (Last 24 hours) at 01/12/19 0730  Last data filed at 01/12/19 0600   Gross per 24 hour   Intake          3602.92 ml   Output             4800 ml   Net         -1197.08 ml       Laboratory  Recent Labs      01/09/19   0918  01/10/19   0315  01/11/19   0020   WBC  18.5*  9.8  8.3   RBC  5.00  4.03*  3.98*   HEMOGLOBIN  14.9  11.8*  11.5*   HEMATOCRIT  45.0  36.1*  33.6*   MCV  90.0  87.6  84.4   MCH  29.8  29.5  28.9   MCHC  33.1*  33.7  34.2   RDW  44.4  43.9  42.3   PLATELETCT  267  251  210   MPV  10.7  9.9  9.8     Recent Labs      01/11/19   1128  01/11/19   1955  01/12/19   0535   SODIUM  135  134*  135   POTASSIUM  3.2*  3.9  3.3*   CHLORIDE  107  105  104   CO2  20  20  23   GLUCOSE  137*  355*  290*   BUN  3*  <3*  <3*   CREATININE  0.51  0.55  0.49*   CALCIUM  8.1*  8.2*  8.4*             Recent Labs      01/10/19   0108   TRIGLYCERIDE  69   HDL  39*   LDL  73       Imaging  IR-MIDLINE CATHETER INSERTION W/ GUIDANCE > AGE 5   Final Result                  Ultrasound-guided midline placement performed by qualified nursing staff    as above.          DX-CHEST-PORTABLE (1 VIEW)   Final Result         1.  No acute cardiopulmonary disease.   2.  Right internal jugular central line is seen within the right atrium, could be withdrawn 4.7 cm.      DX-CHEST-PORTABLE (1 VIEW)   Final Result      Right internal jugular line tip overlies the SVC. No pneumothorax.      [id number] VIEW   Final Result      Constipation pattern of the colon.      DX-CHEST-LIMITED (1 VIEW)   Final Result      No acute cardiopulmonary disease.      US-ABDOMEN COMPLETE SURVEY    (Results Pending)        Assessment/Plan  * Upper GI bleeding   Assessment & Plan    On PPI  Possibly from nausea vomiting and esophageal tear  Monitor closely     Diabetic ketoacidosis (HCC)- (present on admission)   Assessment & Plan    ICU care  DKA  protocol  Aggressive volume resuscitation  Titrate off insulin drip once the patient is able to take p.o.     Metabolic acidosis- (present on admission)   Assessment & Plan    Secondary to DKA  Adjusting  Monitor resuscitation     Epigastric abdominal pain- (present on admission)   Assessment & Plan    2/2 intractable vomiting / gastroparesis / DKA related   Plan as above in N/V  GI consultation if persistent N/V and drop in H&H / hematemesis evident   Check KUB / CXRAY   IV dilaudid for pain control at this time      Type 1 diabetes mellitus (HCC)- (present on admission)   Assessment & Plan    In DKA  Might benefit from diabetic education     Intractable nausea and vomiting- (present on admission)   Assessment & Plan    Appears related to DKA / diabetic gastroparesis  C/O coffee ground emesis  IV protonix 80 mg followed by 8 mg / hour   If hematemesis recurrent, GI consultation  X 1 Zofran / IV Reglan scheduled   GI cocktail / Sucralfate  Emesis control  Mainstay will be glycemic control long-term     Gastroparesis- (present on admission)   Assessment & Plan    Recent diagnosis with significant prolonged gastric emptying time  On Reglan  Mainstay will be aggressive glycemic control     Hyponatremia- (present on admission)   Assessment & Plan    Pseudohyponatremia / Hypovolemic  Continue medical management and monitor      Essential hypertension- (present on admission)   Assessment & Plan    Will resume PO regimen when clinically better     Dyslipidemia- (present on admission)   Assessment & Plan    Resume oral regimen once able to tolerate     Leukemoid reaction- (present on admission)   Assessment & Plan    Reactive from DKA  Monitor - no infectious concerns apparent at this time  UA pending  Will check CXRAY      Plan  Continue with glucose until the patient is able to take p.o. Sufficiently  Frequent blood sugar checks  Additional medications ordered for emesis control, trial of erythromycin  Follow electrolytes  closely  Ultrasound of the abdomen and kidneys  Symptom control  Follow labs closely  See orders  Critically ill  I have performed a physical exam and reviewed and updated ROS and Plan today . In review of yesterday's note , there are no changes except as documented above.     VTE prophylaxis: Contraindicated chemically, SCD

## 2019-01-12 NOTE — PROGRESS NOTES
Critical Care Progress Note    Date of admission  1/9/2019    Chief Complaint  28 y.o. female with hx of DM type 1 c/b neuropathy, retinopathy, and gastroparesis, hx of recurrent hospitalization for DKA recently in 12/2018 who now came back to ED after been having nausea and vomiting. Pt noted coffee ground emesis. At ED, pt was not hypotensive, Hgb 13-14s, BG in 400s, + beta hydroxybutyrate, pH 7.16. Protonix gtt started. 3L fluid boluses given. Insulin gtt started. No hypotension or tachycardia. Pt is alert, oriented. Appears ill.     Hospital Course    1/9 admitted for dka      Interval Problem Update  Reviewed last 24 hour events:  Sliding scale  lantus 25 bid  d10 75 ml/hr, change to d5 w same rate with LR  Room air  3 L nc  Chest xray limited edema, no consolidations  K 40 meq this am  Central line to be removed if midline obtained  Try to get peripheral, otherwise midline  K scale until leaves ICU, k 2.8 this am  Continues to have nausea  Abdominal pain resolving  Received 16 L IVF on this admission  Bicarb up to 23, continue sq insulin, ssi  Increase as po intake increases  Tolerated soup    Review of Systems  Review of Systems   Constitutional: Positive for malaise/fatigue. Negative for chills, diaphoresis, fever and weight loss.   HENT: Negative for congestion and sinus pain.    Eyes: Negative for blurred vision, double vision and photophobia.   Respiratory: Negative for cough, hemoptysis, sputum production, shortness of breath, wheezing and stridor.    Cardiovascular: Negative for chest pain, palpitations and leg swelling.   Gastrointestinal: Positive for abdominal pain and nausea. Negative for blood in stool, heartburn, melena and vomiting.        Improved vomiting, abdominal pain nearly resolved, no distension, diffuse   Genitourinary: Negative for dysuria and urgency.   Musculoskeletal: Negative for back pain, myalgias and neck pain.   Skin: Negative for itching and rash.   Neurological: Negative for  dizziness, tingling, sensory change, speech change, focal weakness and headaches.   Endo/Heme/Allergies: Negative for polydipsia. Does not bruise/bleed easily.   Psychiatric/Behavioral: Negative for depression. The patient is not nervous/anxious.         Vital Signs for last 24 hours   Temp:  [36.3 °C (97.3 °F)-36.9 °C (98.4 °F)] 36.7 °C (98.1 °F)  Pulse:  [] 99  Resp:  [16-32] 18    Hemodynamic parameters for last 24 hours       Respiratory       Physical Exam   Physical Exam   Constitutional: She is oriented to person, place, and time. She appears distressed.   Ill appearing but seems to be more energetic, improving compared to prior daty   HENT:   Head: Normocephalic and atraumatic.   Right Ear: External ear normal.   Left Ear: External ear normal.   Mouth/Throat: No oropharyngeal exudate.   Eyes: Pupils are equal, round, and reactive to light. Conjunctivae and EOM are normal. Right eye exhibits no discharge. Left eye exhibits no discharge.   Neck: No JVD present. No tracheal deviation present.   Cardiovascular: Normal rate, regular rhythm and normal heart sounds.    No murmur heard.  Pulmonary/Chest: Breath sounds normal. No stridor. She is in respiratory distress. She has no wheezes. She has no rales. She exhibits no tenderness.   On 3 L nc   Abdominal: She exhibits no mass. There is tenderness. There is no rebound and no guarding.   imporved from overnight, now mild   Musculoskeletal: She exhibits edema. She exhibits no tenderness or deformity.   Neurological: She is alert and oriented to person, place, and time. She displays normal reflexes. No cranial nerve deficit. Coordination normal.   Skin: Skin is warm. No rash noted. She is diaphoretic. No erythema.   Psychiatric: She has a normal mood and affect. Her behavior is normal.   Less fatigued       Medications  Current Facility-Administered Medications   Medication Dose Route Frequency Provider Last Rate Last Dose   • potassium chloride in water (KCL)  ivpb **Administer in ICU only** 40 mEq  40 mEq Intravenous Once Aníbal Gomez M.D.       • insulin glargine (LANTUS) injection 25 Units  25 Units Subcutaneous BID INSULIN Aníbal Gomez M.D.   25 Units at 01/12/19 0536   • omeprazole (PRILOSEC) capsule 20 mg  20 mg Oral DAILY Aníbal Gomez M.D.   20 mg at 01/12/19 0528   • LORazepam (ATIVAN) injection 0.5-1 mg  0.5-1 mg Intravenous Q4HRS PRN Sheng Campuzano M.D.   1 mg at 01/12/19 0750   • prochlorperazine (COMPAZINE) suppository 25 mg  25 mg Rectal Q12HRS PRN Sheng Campuzano M.D.       • prochlorperazine (COMPAZINE) tablet 10 mg  10 mg Oral Q6HRS PRN Sheng Campuzano M.D.       • metoclopramide (REGLAN) injection 10 mg  10 mg Intravenous Q6HRS Chris Street M.D.   10 mg at 01/12/19 0527   • HYDROmorphone pf (DILAUDID) injection 0.5 mg  0.5 mg Intravenous Q3HRS PRN Chris Street M.D.   0.5 mg at 01/12/19 0528   • senna-docusate (PERICOLACE or SENOKOT S) 8.6-50 MG per tablet 2 Tab  2 Tab Oral BID Chris Street M.D.   Stopped at 01/10/19 0600    And   • polyethylene glycol/lytes (MIRALAX) PACKET 1 Packet  1 Packet Oral QDAY PRN Chris Street M.D.        And   • magnesium hydroxide (MILK OF MAGNESIA) suspension 30 mL  30 mL Oral QDAY PRN Chris Street M.D.        And   • bisacodyl (DULCOLAX) suppository 10 mg  10 mg Rectal QDAY PRN Chris Street M.D.       • Respiratory Care per Protocol   Nebulization Continuous RT Chris Street M.D.       • ondansetron (ZOFRAN) syringe/vial injection 4 mg  4 mg Intravenous Q4HRS PRN Chris Street M.D.   4 mg at 01/10/19 2234   • ondansetron (ZOFRAN ODT) dispertab 4 mg  4 mg Oral Q4HRS PRN Chris Street M.D.   4 mg at 01/09/19 1610   • acetaminophen (TYLENOL) tablet 650 mg  650 mg Oral Q6HRS PRN Chris Street M.D.       • dextrose 10% infusion   Intravenous Continuous Michael Gayle M.D. 75 mL/hr at 01/12/19 0030         Fluids    Intake/Output Summary (Last 24 hours) at 01/12/19 0844  Last data filed at 01/12/19 0800    Gross per 24 hour   Intake          3752.92 ml   Output             4800 ml   Net         -1047.08 ml       Laboratory          Recent Labs      01/10/19   0928   01/11/19 0020 01/11/19 0255 01/11/19 1128 01/11/19 1955 01/12/19   0535   SODIUM  140   < >  139  137   < >  135  134*  135   POTASSIUM  3.5*   < >  3.6  3.0*   < >  3.2*  3.9  3.3*   CHLORIDE  118*   < >  112  110   < >  107  105  104   CO2  15*   < >  16*  20   < >  20  20  23   BUN  12   < >  4*  4*   < >  3*  <3*  <3*   CREATININE  0.47*   < >  0.47*  0.44*   < >  0.51  0.55  0.49*   MAGNESIUM  1.8   --   1.4*  1.4*   --    --    --    --    PHOSPHORUS  2.9   --   1.4*  1.5*   --    --    --    --    CALCIUM  7.8*   < >  8.3*  8.1*   < >  8.1*  8.2*  8.4*    < > = values in this interval not displayed.     Recent Labs      01/10/19   0108   01/11/19   0020  01/11/19   0255   01/11/19   1128  01/11/19   1955  01/12/19   0535   ALTSGPT  14   --   13  14   --    --    --    --    ASTSGOT  10*   --   20  18   --    --    --    --    ALKPHOSPHAT  70   --   57  55   --    --    --    --    TBILIRUBIN  0.5   --   0.5  0.5   --    --    --    --    GLUCOSE  203*   < >  135*  133*   < >  137*  355*  290*    < > = values in this interval not displayed.     Recent Labs      01/10/19   0108  01/10/19   0315  01/11/19   0020  01/11/19   0255   WBC   --   9.8  8.3   --    NEUTSPOLYS   --   76.90*  63.50   --    LYMPHOCYTES   --   13.60*  27.10   --    MONOCYTES   --   8.10  7.40   --    EOSINOPHILS   --   0.10  1.10   --    BASOPHILS   --   0.20  0.40   --    ASTSGOT  10*   --   20  18   ALTSGPT  14   --   13  14   ALKPHOSPHAT  70   --   57  55   TBILIRUBIN  0.5   --   0.5  0.5     Recent Labs      01/09/19   0918  01/10/19   0315  01/11/19   0020   RBC  5.00  4.03*  3.98*   HEMOGLOBIN  14.9  11.8*  11.5*   HEMATOCRIT  45.0  36.1*  33.6*   PLATELETCT  267  251  210       Imaging  X-Ray:  I have personally reviewed the images and compared with prior  images. and My impression is: edema present.  No consolidations, central line adequate placement  EKG:  I have personally reviewed the images and compared with prior images. and My impression is: no st/t changes for ischemia    Assessment/Plan  * Upper GI bleeding   Assessment & Plan    Likely josé miguel collazo tear  ppi once daily  Limited course     Diabetic ketoacidosis (HCC)- (present on admission)   Assessment & Plan    Transitioned yesterday off insulin drip  Bicarb 23, normal gap  Continued careful fluids, changed to d5/LR  On 3 L nc with some hypoxia and edema on cxray  Sq insulin, adjust accordingly, ssi  Hypoglycemic protocol       Metabolic acidosis- (present on admission)   Assessment & Plan    Secondary to DKA  > 16 L received  Careful fluid resuscitation  Changed 75 d5, lr from d10  Acidosis improved to normal range, gap closed       Type 1 diabetes mellitus (HCC)- (present on admission)   Assessment & Plan    ? Compliance  No significant signs infection  Gi bleeding but may be josé miguel collazo from vomiting  Diabetic education prior to discharge  Continue dka therapy as above       Intractable nausea and vomiting- (present on admission)   Assessment & Plan    Due to dka/gastroparesis  Slowly tolerating liquids  Continue to advance as tolerated     Gastroparesis- (present on admission)   Assessment & Plan    Continued nausea, some vomiting billious  Improved with IVF  Continue slow fluid resuscitation  No distension     Hyponatremia- (present on admission)   Assessment & Plan    Secondary to severe hypovolemia  improved     Essential hypertension- (present on admission)   Assessment & Plan    Adequately controlled     Dyslipidemia- (present on admission)   Assessment & Plan    On statin, cont     Leukemoid reaction- (present on admission)   Assessment & Plan    Likely poor hydration  Improved  No significant work up for infection          VTE:  Contraindicated  Ulcer: PPI  Lines: None    I have performed a  physical exam and reviewed and updated ROS and Plan today (1/12/2019). In review of yesterday's note (1/11/2019), there are no changes except as documented above.     Discussed patient condition and risk of morbidity and/or mortality with Hospitalist, Family, RN, RT, Therapies, Pharmacy, , Code status disscussed, Charge nurse / hot rounds and Patient

## 2019-01-12 NOTE — CARE PLAN
Problem: Mobility  Goal: Risk for activity intolerance will decrease  Outcome: PROGRESSING SLOWER THAN EXPECTED  Nausea with mobilization deterring pt engagement

## 2019-01-13 PROBLEM — E87.1 HYPONATREMIA: Status: RESOLVED | Noted: 2019-01-09 | Resolved: 2019-01-13

## 2019-01-13 PROBLEM — R09.02 HYPOXIA: Status: ACTIVE | Noted: 2019-01-13

## 2019-01-13 LAB
ANION GAP SERPL CALC-SCNC: 4 MMOL/L (ref 0–11.9)
B-OH-BUTYR SERPL-MCNC: 0.07 MMOL/L (ref 0.02–0.27)
BUN SERPL-MCNC: <3 MG/DL (ref 8–22)
CALCIUM SERPL-MCNC: 8.2 MG/DL (ref 8.5–10.5)
CHLORIDE SERPL-SCNC: 107 MMOL/L (ref 96–112)
CO2 SERPL-SCNC: 29 MMOL/L (ref 20–33)
CREAT SERPL-MCNC: 0.41 MG/DL (ref 0.5–1.4)
ERYTHROCYTE [DISTWIDTH] IN BLOOD BY AUTOMATED COUNT: 41.1 FL (ref 35.9–50)
GLUCOSE BLD-MCNC: 123 MG/DL (ref 65–99)
GLUCOSE BLD-MCNC: 143 MG/DL (ref 65–99)
GLUCOSE BLD-MCNC: 150 MG/DL (ref 65–99)
GLUCOSE BLD-MCNC: 72 MG/DL (ref 65–99)
GLUCOSE BLD-MCNC: 94 MG/DL (ref 65–99)
GLUCOSE SERPL-MCNC: 107 MG/DL (ref 65–99)
HCT VFR BLD AUTO: 32.6 % (ref 37–47)
HGB BLD-MCNC: 11.2 G/DL (ref 12–16)
MAGNESIUM SERPL-MCNC: 1.7 MG/DL (ref 1.5–2.5)
MCH RBC QN AUTO: 29.9 PG (ref 27–33)
MCHC RBC AUTO-ENTMCNC: 34.4 G/DL (ref 33.6–35)
MCV RBC AUTO: 87.2 FL (ref 81.4–97.8)
PHOSPHATE SERPL-MCNC: 3 MG/DL (ref 2.5–4.5)
PLATELET # BLD AUTO: 162 K/UL (ref 164–446)
PMV BLD AUTO: 9.9 FL (ref 9–12.9)
POTASSIUM SERPL-SCNC: 3.3 MMOL/L (ref 3.6–5.5)
RBC # BLD AUTO: 3.74 M/UL (ref 4.2–5.4)
SODIUM SERPL-SCNC: 140 MMOL/L (ref 135–145)
WBC # BLD AUTO: 3.9 K/UL (ref 4.8–10.8)

## 2019-01-13 PROCEDURE — A9270 NON-COVERED ITEM OR SERVICE: HCPCS | Performed by: HOSPITALIST

## 2019-01-13 PROCEDURE — A9270 NON-COVERED ITEM OR SERVICE: HCPCS | Performed by: INTERNAL MEDICINE

## 2019-01-13 PROCEDURE — 99232 SBSQ HOSP IP/OBS MODERATE 35: CPT | Performed by: HOSPITALIST

## 2019-01-13 PROCEDURE — 770001 HCHG ROOM/CARE - MED/SURG/GYN PRIV*

## 2019-01-13 PROCEDURE — 700111 HCHG RX REV CODE 636 W/ 250 OVERRIDE (IP): Performed by: INTERNAL MEDICINE

## 2019-01-13 PROCEDURE — 700102 HCHG RX REV CODE 250 W/ 637 OVERRIDE(OP): Performed by: INTERNAL MEDICINE

## 2019-01-13 PROCEDURE — 99233 SBSQ HOSP IP/OBS HIGH 50: CPT | Performed by: INTERNAL MEDICINE

## 2019-01-13 PROCEDURE — 83735 ASSAY OF MAGNESIUM: CPT

## 2019-01-13 PROCEDURE — 80048 BASIC METABOLIC PNL TOTAL CA: CPT

## 2019-01-13 PROCEDURE — 700102 HCHG RX REV CODE 250 W/ 637 OVERRIDE(OP): Performed by: HOSPITALIST

## 2019-01-13 PROCEDURE — 82010 KETONE BODYS QUAN: CPT

## 2019-01-13 PROCEDURE — 700105 HCHG RX REV CODE 258: Performed by: INTERNAL MEDICINE

## 2019-01-13 PROCEDURE — 85027 COMPLETE CBC AUTOMATED: CPT

## 2019-01-13 PROCEDURE — 82962 GLUCOSE BLOOD TEST: CPT

## 2019-01-13 PROCEDURE — 84100 ASSAY OF PHOSPHORUS: CPT

## 2019-01-13 RX ORDER — INSULIN GLARGINE 100 [IU]/ML
20 INJECTION, SOLUTION SUBCUTANEOUS
Status: DISCONTINUED | OUTPATIENT
Start: 2019-01-13 | End: 2019-01-15 | Stop reason: HOSPADM

## 2019-01-13 RX ORDER — DRONABINOL 5 MG/1
5 CAPSULE ORAL
Status: DISCONTINUED | OUTPATIENT
Start: 2019-01-13 | End: 2019-01-15

## 2019-01-13 RX ADMIN — SODIUM CHLORIDE, SODIUM LACTATE, POTASSIUM CHLORIDE, CALCIUM CHLORIDE AND DEXTROSE MONOHYDRATE: 5; 600; 310; 30; 20 INJECTION, SOLUTION INTRAVENOUS at 23:30

## 2019-01-13 RX ADMIN — METOCLOPRAMIDE 10 MG: 5 INJECTION, SOLUTION INTRAMUSCULAR; INTRAVENOUS at 17:27

## 2019-01-13 RX ADMIN — METOCLOPRAMIDE 10 MG: 5 INJECTION, SOLUTION INTRAMUSCULAR; INTRAVENOUS at 06:15

## 2019-01-13 RX ADMIN — ERYTHROMYCIN ETHYLSUCCINATE 200 MG: 200 SUSPENSION ORAL at 23:31

## 2019-01-13 RX ADMIN — OMEPRAZOLE 20 MG: 20 CAPSULE, DELAYED RELEASE ORAL at 06:15

## 2019-01-13 RX ADMIN — ERYTHROMYCIN ETHYLSUCCINATE 200 MG: 200 SUSPENSION ORAL at 01:00

## 2019-01-13 RX ADMIN — HYDROMORPHONE HYDROCHLORIDE 0.5 MG: 1 INJECTION, SOLUTION INTRAMUSCULAR; INTRAVENOUS; SUBCUTANEOUS at 00:13

## 2019-01-13 RX ADMIN — ERYTHROMYCIN ETHYLSUCCINATE 200 MG: 200 SUSPENSION ORAL at 17:27

## 2019-01-13 RX ADMIN — INSULIN GLARGINE 30 UNITS: 100 INJECTION, SOLUTION SUBCUTANEOUS at 09:05

## 2019-01-13 RX ADMIN — HYDROMORPHONE HYDROCHLORIDE 0.5 MG: 1 INJECTION, SOLUTION INTRAMUSCULAR; INTRAVENOUS; SUBCUTANEOUS at 03:13

## 2019-01-13 RX ADMIN — SODIUM CHLORIDE, SODIUM LACTATE, POTASSIUM CHLORIDE, CALCIUM CHLORIDE AND DEXTROSE MONOHYDRATE: 5; 600; 310; 30; 20 INJECTION, SOLUTION INTRAVENOUS at 06:17

## 2019-01-13 RX ADMIN — DRONABINOL 5 MG: 5 CAPSULE ORAL at 17:27

## 2019-01-13 RX ADMIN — METOCLOPRAMIDE 10 MG: 5 INJECTION, SOLUTION INTRAMUSCULAR; INTRAVENOUS at 00:13

## 2019-01-13 RX ADMIN — STANDARDIZED SENNA CONCENTRATE AND DOCUSATE SODIUM 2 TABLET: 8.6; 5 TABLET, FILM COATED ORAL at 17:27

## 2019-01-13 RX ADMIN — METOCLOPRAMIDE 10 MG: 5 INJECTION, SOLUTION INTRAMUSCULAR; INTRAVENOUS at 12:45

## 2019-01-13 RX ADMIN — POLYETHYLENE GLYCOL 3350 1 PACKET: 17 POWDER, FOR SOLUTION ORAL at 12:44

## 2019-01-13 RX ADMIN — HYDROMORPHONE HYDROCHLORIDE 0.5 MG: 1 INJECTION, SOLUTION INTRAMUSCULAR; INTRAVENOUS; SUBCUTANEOUS at 06:14

## 2019-01-13 RX ADMIN — ERYTHROMYCIN ETHYLSUCCINATE 200 MG: 200 SUSPENSION ORAL at 12:44

## 2019-01-13 RX ADMIN — INSULIN GLARGINE 20 UNITS: 100 INJECTION, SOLUTION SUBCUTANEOUS at 17:28

## 2019-01-13 RX ADMIN — DRONABINOL 5 MG: 5 CAPSULE ORAL at 12:44

## 2019-01-13 RX ADMIN — ACETAMINOPHEN 650 MG: 325 TABLET, FILM COATED ORAL at 19:57

## 2019-01-13 RX ADMIN — METOCLOPRAMIDE 10 MG: 5 INJECTION, SOLUTION INTRAMUSCULAR; INTRAVENOUS at 23:31

## 2019-01-13 RX ADMIN — ERYTHROMYCIN ETHYLSUCCINATE 200 MG: 200 SUSPENSION ORAL at 06:16

## 2019-01-13 ASSESSMENT — ENCOUNTER SYMPTOMS
NERVOUS/ANXIOUS: 0
SINUS PAIN: 0
ABDOMINAL PAIN: 0
HEMOPTYSIS: 0
DIAPHORESIS: 0
NAUSEA: 1
BLOOD IN STOOL: 0
MUSCULOSKELETAL NEGATIVE: 1
SHORTNESS OF BREATH: 0
STRIDOR: 0
BRUISES/BLEEDS EASILY: 0
SENSORY CHANGE: 0
VOMITING: 1
SPUTUM PRODUCTION: 0
WEIGHT LOSS: 0
BLURRED VISION: 0
EYES NEGATIVE: 1
TINGLING: 0
POLYDIPSIA: 0
MYALGIAS: 0
WHEEZING: 0
BACK PAIN: 0
PALPITATIONS: 0
CARDIOVASCULAR NEGATIVE: 1
COUGH: 0
FOCAL WEAKNESS: 0
DIZZINESS: 0
SPEECH CHANGE: 0
NERVOUS/ANXIOUS: 1
NECK PAIN: 0
CHILLS: 0
HEARTBURN: 0
DEPRESSION: 0
HEADACHES: 0
DOUBLE VISION: 0
PHOTOPHOBIA: 0
FEVER: 0

## 2019-01-13 ASSESSMENT — PAIN SCALES - GENERAL
PAINLEVEL_OUTOF10: 6
PAINLEVEL_OUTOF10: 8
PAINLEVEL_OUTOF10: 4
PAINLEVEL_OUTOF10: 8
PAINLEVEL_OUTOF10: 4
PAINLEVEL_OUTOF10: 0
PAINLEVEL_OUTOF10: 2
PAINLEVEL_OUTOF10: 3
PAINLEVEL_OUTOF10: 6
PAINLEVEL_OUTOF10: 6
PAINLEVEL_OUTOF10: 2

## 2019-01-13 NOTE — PROGRESS NOTES
Critical Care Progress Note    Date of admission  1/9/2019    Chief Complaint  28 y.o. female with hx of DM type 1 c/b neuropathy, retinopathy, and gastroparesis, hx of recurrent hospitalization for DKA recently in 12/2018 who now came back to ED after been having nausea and vomiting. Pt noted coffee ground emesis. At ED, pt was not hypotensive, Hgb 13-14s, BG in 400s, + beta hydroxybutyrate, pH 7.16. Protonix gtt started. 3L fluid boluses given. Insulin gtt started. No hypotension or tachycardia. Pt is alert, oriented. Appears ill.     Hospital Course    1/9 admitted for dka      Interval Problem Update  Reviewed last 24 hour events:  75 ml/hr lr  Advance diet  Continue lantus current dose 30 u bid  Us abdomen, non significant mild hydronephrosis  Lasix 20 mg once IV  Stop LR  3 L nc  scd  Prilosec, ok to stop  No ab  Supplement  Gastric study slow emptying, prior  Suppository and enema    Review of Systems  Review of Systems   Constitutional: Positive for malaise/fatigue. Negative for chills, diaphoresis, fever and weight loss.        Ill appearing   HENT: Negative for congestion and sinus pain.    Eyes: Negative for blurred vision, double vision and photophobia.   Respiratory: Negative for cough, hemoptysis, sputum production, shortness of breath, wheezing and stridor.    Cardiovascular: Negative for chest pain, palpitations and leg swelling.   Gastrointestinal: Positive for nausea and vomiting. Negative for abdominal pain, blood in stool, heartburn and melena.        Improved vomiting, abdominal pain nearly resolved, no distension, diffuse   Genitourinary: Negative for dysuria and urgency.   Musculoskeletal: Negative for back pain, myalgias and neck pain.   Skin: Negative for itching and rash.   Neurological: Negative for dizziness, tingling, sensory change, speech change, focal weakness and headaches.   Endo/Heme/Allergies: Negative for polydipsia. Does not bruise/bleed easily.   Psychiatric/Behavioral:  Negative for depression. The patient is not nervous/anxious.         Vital Signs for last 24 hours   Temp:  [36.2 °C (97.1 °F)-37.1 °C (98.8 °F)] 36.6 °C (97.8 °F)  Pulse:  [] 96  Resp:  [13-28] 14    Hemodynamic parameters for last 24 hours       Respiratory       Physical Exam   Physical Exam   Constitutional: She is oriented to person, place, and time. No distress.   Cont ill appearance, fatiigued   HENT:   Head: Normocephalic and atraumatic.   Right Ear: External ear normal.   Left Ear: External ear normal.   Mouth/Throat: No oropharyngeal exudate.   Eyes: Pupils are equal, round, and reactive to light. Conjunctivae and EOM are normal. Right eye exhibits no discharge. Left eye exhibits no discharge.   Neck: No JVD present. No tracheal deviation present.   Cardiovascular: Normal rate, regular rhythm and normal heart sounds.    No murmur heard.  Pulmonary/Chest: Effort normal and breath sounds normal. No stridor. No respiratory distress. She has no wheezes. She has no rales. She exhibits no tenderness.   Requiring oxygen   Abdominal: She exhibits no mass. There is tenderness. There is no rebound and no guarding.   imporved from overnight, now mild   Musculoskeletal: She exhibits edema. She exhibits no tenderness or deformity.   Neurological: She is alert and oriented to person, place, and time. She displays normal reflexes. No cranial nerve deficit. Coordination normal.   Skin: Skin is warm. No rash noted. She is not diaphoretic. No erythema.   Psychiatric: She has a normal mood and affect. Her behavior is normal.   Less fatigued       Medications  Current Facility-Administered Medications   Medication Dose Route Frequency Provider Last Rate Last Dose   • insulin regular (HUMULIN R) injection 3-14 Units  3-14 Units Subcutaneous 4X/DAY ACHS Aníbal Gomez M.D.   Stopped at 01/12/19 1700    And   • glucose 4 g chewable tablet 16 g  16 g Oral Q15 MIN PRN Aníbal Gomez M.D.        And   • dextrose 50% (D50W)  injection 25 mL  25 mL Intravenous Q15 MIN PRN Aníbal Gomez M.D.       • D5LR infusion   Intravenous Continuous Aníbal Gomez M.D. 75 mL/hr at 01/13/19 0617     • insulin glargine (LANTUS) injection 30 Units  30 Units Subcutaneous BID INSULIN Aníbal Gomez M.D.   30 Units at 01/12/19 1747   • erythromycin ethylsuccinate 200 mg/5 mL (E.E.S.) suspension 200 mg  200 mg Oral Q6HRS Sheng Campuzano M.D.   200 mg at 01/13/19 0616   • omeprazole (PRILOSEC) capsule 20 mg  20 mg Oral DAILY Aníbal Gomez M.D.   20 mg at 01/13/19 0615   • LORazepam (ATIVAN) injection 0.5-1 mg  0.5-1 mg Intravenous Q4HRS PRN Sheng Campuzano M.D.   1 mg at 01/12/19 1756   • prochlorperazine (COMPAZINE) suppository 25 mg  25 mg Rectal Q12HRS PRN Sheng Campuzano M.D.       • prochlorperazine (COMPAZINE) tablet 10 mg  10 mg Oral Q6HRS PRRONALD Campuzano M.D.       • metoclopramide (REGLAN) injection 10 mg  10 mg Intravenous Q6HRS Chris Street M.D.   10 mg at 01/13/19 0615   • HYDROmorphone pf (DILAUDID) injection 0.5 mg  0.5 mg Intravenous Q3HRS PRN Chris Street M.D.   0.5 mg at 01/13/19 0614   • senna-docusate (PERICOLACE or SENOKOT S) 8.6-50 MG per tablet 2 Tab  2 Tab Oral BID Chris Street M.D.   Stopped at 01/10/19 0600    And   • polyethylene glycol/lytes (MIRALAX) PACKET 1 Packet  1 Packet Oral QDAY PRN Chris Street M.D.        And   • magnesium hydroxide (MILK OF MAGNESIA) suspension 30 mL  30 mL Oral QDAY PRN Chris Street M.D.        And   • bisacodyl (DULCOLAX) suppository 10 mg  10 mg Rectal QDAY PRN Chris Street M.D.       • Respiratory Care per Protocol   Nebulization Continuous RT Chris Street M.D.       • ondansetron (ZOFRAN) syringe/vial injection 4 mg  4 mg Intravenous Q4HRS PRN Chris Street M.D.   4 mg at 01/10/19 2234   • ondansetron (ZOFRAN ODT) dispertab 4 mg  4 mg Oral Q4HRS PRN Chris Street M.D.   4 mg at 01/09/19 1610   • acetaminophen (TYLENOL) tablet 650 mg  650 mg Oral Q6HRS PRN Chris Street M.D.            Fluids    Intake/Output Summary (Last 24 hours) at 01/13/19 0700  Last data filed at 01/13/19 0617   Gross per 24 hour   Intake          1938.75 ml   Output             2800 ml   Net          -861.25 ml       Laboratory          Recent Labs      01/11/19 0020 01/11/19 0255 01/11/19 1955 01/12/19 0535 01/13/19 0449   SODIUM  139  137   < >  134*  135  140   POTASSIUM  3.6  3.0*   < >  3.9  3.3*  3.3*   CHLORIDE  112  110   < >  105  104  107   CO2  16*  20   < >  20  23  29   BUN  4*  4*   < >  <3*  <3*  <3*   CREATININE  0.47*  0.44*   < >  0.55  0.49*  0.41*   MAGNESIUM  1.4*  1.4*   --    --    --   1.7   PHOSPHORUS  1.4*  1.5*   --    --    --   3.0   CALCIUM  8.3*  8.1*   < >  8.2*  8.4*  8.2*    < > = values in this interval not displayed.     Recent Labs      01/11/19 0020 01/11/19 0255 01/11/19 1955 01/12/19 0535 01/13/19 0449   ALTSGPT  13  14   --    --    --    --    ASTSGOT  20  18   --    --    --    --    ALKPHOSPHAT  57  55   --    --    --    --    TBILIRUBIN  0.5  0.5   --    --    --    --    GLUCOSE  135*  133*   < >  355*  290*  107*    < > = values in this interval not displayed.     Recent Labs      01/11/19 0020 01/11/19 0255 01/13/19 0449   WBC  8.3   --   3.9*   NEUTSPOLYS  63.50   --    --    LYMPHOCYTES  27.10   --    --    MONOCYTES  7.40   --    --    EOSINOPHILS  1.10   --    --    BASOPHILS  0.40   --    --    ASTSGOT  20  18   --    ALTSGPT  13  14   --    ALKPHOSPHAT  57  55   --    TBILIRUBIN  0.5  0.5   --      Recent Labs      01/11/19 0020 01/13/19 0449   RBC  3.98*  3.74*   HEMOGLOBIN  11.5*  11.2*   HEMATOCRIT  33.6*  32.6*   PLATELETCT  210  162*       Imaging  X-Ray:  I have personally reviewed the images and compared with prior images., My impression is: some edema otherwise no consolidations noted and No film today  EKG:  I have personally reviewed the images and compared with prior images. and My impression is: no st/t  changes for ischemia    Assessment/Plan  Obesity  Body mass index is 28.54 kg/m².    Leukemoid reaction  Reactive from DKA       Essential hypertension  Will resume PO regimen when clinically better    Epigastric abdominal pain  2/2 intractable vomiting / gastroparesis / DKA related   Plan as above in N/V  GI consultation if persistent N/V and drop in H&H / hematemesis evident   Check KUB / CXRAY   IV dilaudid for pain control at this time     Dyslipidemia  Resume oral regimen once able to tolerate    Intractable nausea and vomiting  Appears related to DKA / diabetic gastroparesis  C/O coffee ground emesis  IV protonix 80 mg followed by 8 mg / hour   If hematemesis recurrent, GI consultation  X 1 Zofran / IV Reglan scheduled   GI cocktail / Sucralfate  Emesis control  Mainstay will be glycemic control long-term    Hyponatremia  Pseudohyponatremia / Hypovolemic  Continue medical management and monitor     Diabetic ketoacidosis (HCC)  ICU care  DKA protocol  Aggressive volume resuscitation  Titrate off insulin drip once the patient is able to take p.o.      DKA  Adjusted ssi  Poor po intake  Adjusted lantus, 25 bid, on 35 bid at home  Continued n/v  Diabetic diet  Poorly motivated      Upper GI bleeding  On ppi  Can stop  Due to josé miguel collazo  No recent bleeding since admisison  Hb stable    Intractable nausea and vomiting  Slowly imroving  compbazine  On dronabinol  reglan      Metabolic acidosis  Secondary to DKA  Significant fluid resuscitation  Likely will need diuresis but currently self diuresing well  On lr/d5  Encourage diet  Diabetic diet    Gastroparesis  ruq us non significant  Resolving  Trial diabetic diet, may be more appetizing than liquids    Type 1 diabetes mellitus (HCC)  ? Compliance  No significant signs infection  Gi bleeding but may be josé miguel collazo from vomiting  Diabetic education prior to discharge  Continue dka therapy as above  Adjusting lntus and ssi      Metabolic acidosis  Secondary to  DKA  Adjusting  Monitor resuscitation    Upper GI bleeding  On PPI  Possibly from nausea vomiting and esophageal tear  Monitor closely    Gastroparesis  Recent diagnosis with significant prolonged gastric emptying time  On Reglan  Mainstay will be aggressive glycemic control    Type 1 diabetes mellitus (HCC)  In DKA  Might benefit from diabetic education    Leukemoid reaction  Likely poor hydration  Improved  No significant work up for infection    Essential hypertension  Adequately controlled    Dyslipidemia  On statin, cont    Hyponatremia  Secondary to severe hypovolemia  improved    Hypoxia  Affected by significant IVF for DKA  On 3 L O2  Currently diuresing well without meds  May need lasix      VTE:  Contraindicated  Ulcer: PPI  Lines: None    I have performed a physical exam and reviewed and updated ROS and Plan today (1/13/2019). In review of yesterday's note (1/12/2019), there are no changes except as documented above.     Discussed patient condition and risk of morbidity and/or mortality with Hospitalist, RN, RT, Therapies, Pharmacy, Code status disscussed, Charge nurse / hot rounds and Patient

## 2019-01-13 NOTE — CARE PLAN
Problem: Knowledge Deficit  Goal: Knowledge of disease process/condition, treatment plan, diagnostic tests, and medications will improve    Intervention: Assess knowledge level of disease process/condition, treatment plan, diagnostic tests, and medications  Reviewing plan of care, activities, and medication with patient.  Encouraging patient to ask questions and participate in plan of care.  Providing answers to all questions.  Continuing with current plan of care.        Problem: Pain Management  Goal: Pain level will decrease to patient's comfort goal    Intervention: Follow pain managment plan developed in collaboration with patient and Interdisciplinary Team  Assessing pain level frequently using 0 to 10 scale.  Providing Dilaudid per MAR.  Providing non-pharmacological intervention and therapeutic communication.

## 2019-01-13 NOTE — PROGRESS NOTES
Two RN skin check performed. No pressure ulcers or wounds noted. Skin is intact. Standard skin breakdown interventions in place.

## 2019-01-13 NOTE — PROGRESS NOTES
Hospital Medicine Daily Progress Note    Date of Service  1/13/2019    Chief Complaint  28 y.o. female admitted 1/9/2019 with onset of nausea vomiting and admitted with DKA.  Reports some coffee-ground emesis after multiple episodes of nausea vomiting.  Admitted for aggressive insulin management.    Hospital Course    28-year-old with gastroparesis, diabetes type 1 admitted for DKA      Interval Problem Update  Patient seen and examined today. ICU Care  Care and plan discussed in IDT/Hot rounds.  Lines and assistive devices reviewed.    Patient tolerating treatment and therapies.  All Data, Medication data reviewed.  Case discussed with nursing as available.  Plan of Care reviewed with patient and notified of changes.  1/10 the patient remains on insulin protocol, received IV fluids, anion gap is improving but CO2 remains low, the patient continues to have nausea vomiting, recent diagnosis of gastroparesis, no further GI bleeding noted, hemoglobin stable.  1/11 patient still with significant nausea, unable to eat anything still, transition from insulin drip to long-acting insulin with glucose infusion ongoing while with poor p.o. intake, no further hematemesis or bleeding noted, no abdominal pain, feels overall better  1/12 patient still with nausea and poor p.o. intake, has received significant amount of fluid, continues on dextrose drip for glycemic support while on long-acting insulin, seems lethargic and poorly motivated, is trying to eat, started on erythromycin for prokinetic purposes, getting ultrasound of the abdomen to rule out abnormality of the renal system and gallbladder possibly  1/13 patient is much improved, was able to eat lunch, replacing electrolytes, on oxygen, received significant volume over the course of the hospitalization, started erythromycin and Marinol, question if this had an effect on her severe nausea vomiting  Consultants/Specialty  Intensivist    Code Status  Full  code    Disposition  ICU okay for transfer    Review of Systems  Review of Systems   Constitutional: Positive for malaise/fatigue. Negative for chills and fever.   HENT: Negative.    Eyes: Negative.    Respiratory: Negative for cough.    Cardiovascular: Negative.  Negative for chest pain and palpitations.   Gastrointestinal: Positive for nausea. Negative for heartburn.   Genitourinary: Negative.  Negative for dysuria and frequency.   Musculoskeletal: Negative.  Negative for back pain and neck pain.   Skin: Negative.  Negative for itching and rash.   Neurological: Negative for dizziness, focal weakness and headaches.   Endo/Heme/Allergies: Negative.  Negative for polydipsia. Does not bruise/bleed easily.   Psychiatric/Behavioral: Negative for depression. The patient is nervous/anxious.         Physical Exam  Temp:  [36.2 °C (97.1 °F)-37.1 °C (98.8 °F)] 36.6 °C (97.8 °F)  Pulse:  [] 95  Resp:  [13-28] 19    Physical Exam   Constitutional: She is oriented to person, place, and time. She appears well-developed and well-nourished. She appears lethargic. She has a sickly appearance.   HENT:   Head: Normocephalic and atraumatic.   Nose: Nose normal.   Mouth/Throat: Oropharynx is clear and moist.   Eyes: Pupils are equal, round, and reactive to light. Conjunctivae and EOM are normal.   Neck: Normal range of motion. Neck supple. No JVD present. No thyromegaly present.   Cardiovascular: Normal rate, regular rhythm and normal heart sounds.  Exam reveals no gallop and no friction rub.    Pulses:       Dorsalis pedis pulses are 2+ on the right side, and 2+ on the left side.   Capillary refill <3 secs   Pulmonary/Chest: Effort normal and breath sounds normal. She has no wheezes. She has no rales.   Abdominal: Soft. Bowel sounds are normal. She exhibits no distension and no mass. There is tenderness. There is no rebound and no guarding.   Musculoskeletal: Normal range of motion. She exhibits no edema or tenderness.    Lymphadenopathy:     She has no cervical adenopathy.   Neurological: She is oriented to person, place, and time. She appears lethargic. No cranial nerve deficit.   Skin: Skin is warm and dry. She is not diaphoretic. No cyanosis. There is pallor.   Psychiatric: She has a normal mood and affect. Her behavior is normal.   Nursing note and vitals reviewed.      Fluids    Intake/Output Summary (Last 24 hours) at 01/13/19 0736  Last data filed at 01/13/19 0617   Gross per 24 hour   Intake          1938.75 ml   Output             2800 ml   Net          -861.25 ml       Laboratory  Recent Labs      01/11/19   0020  01/13/19   0449   WBC  8.3  3.9*   RBC  3.98*  3.74*   HEMOGLOBIN  11.5*  11.2*   HEMATOCRIT  33.6*  32.6*   MCV  84.4  87.2   MCH  28.9  29.9   MCHC  34.2  34.4   RDW  42.3  41.1   PLATELETCT  210  162*   MPV  9.8  9.9     Recent Labs      01/11/19   1955  01/12/19   0535  01/13/19   0449   SODIUM  134*  135  140   POTASSIUM  3.9  3.3*  3.3*   CHLORIDE  105  104  107   CO2  20  23  29   GLUCOSE  355*  290*  107*   BUN  <3*  <3*  <3*   CREATININE  0.55  0.49*  0.41*   CALCIUM  8.2*  8.4*  8.2*                   Imaging  US-ABDOMEN COMPLETE SURVEY   Final Result         1.  Mild right hydronephrosis.   2.  Echogenic lesion in the right hepatic lobe, appearance suggests a hemangioma, otherwise indeterminate. Not visualized on prior study. Follow-up evaluation with three-phase CT of the liver for more definitive characterization.   3.  Mild splenomegaly.   4.  Hepatomegaly         IR-MIDLINE CATHETER INSERTION W/ GUIDANCE > AGE 5   Final Result                  Ultrasound-guided midline placement performed by qualified nursing staff    as above.          DX-CHEST-PORTABLE (1 VIEW)   Final Result         1.  No acute cardiopulmonary disease.   2.  Right internal jugular central line is seen within the right atrium, could be withdrawn 4.7 cm.      DX-CHEST-PORTABLE (1 VIEW)   Final Result      Right internal jugular  line tip overlies the SVC. No pneumothorax.      [id number] VIEW   Final Result      Constipation pattern of the colon.      DX-CHEST-LIMITED (1 VIEW)   Final Result      No acute cardiopulmonary disease.           Assessment/Plan  * Upper GI bleeding   Assessment & Plan    On PPI  Possibly from nausea vomiting and esophageal tear  Monitor closely     Diabetic ketoacidosis (HCC)- (present on admission)   Assessment & Plan    ICU care  DKA protocol  Aggressive volume resuscitation  Titrate off insulin drip once the patient is able to take p.o.     Metabolic acidosis- (present on admission)   Assessment & Plan    Secondary to DKA  Adjusting  Monitor resuscitation     Type 1 diabetes mellitus (HCC)- (present on admission)   Assessment & Plan    In DKA  Might benefit from diabetic education     Intractable nausea and vomiting- (present on admission)   Assessment & Plan    Appears related to DKA / diabetic gastroparesis  C/O coffee ground emesis  IV protonix 80 mg followed by 8 mg / hour   If hematemesis recurrent, GI consultation  X 1 Zofran / IV Reglan scheduled   GI cocktail / Sucralfate  Emesis control  Mainstay will be glycemic control long-term     Gastroparesis- (present on admission)   Assessment & Plan    Recent diagnosis with significant prolonged gastric emptying time  On Reglan  Mainstay will be aggressive glycemic control     Hyponatremia- (present on admission)   Assessment & Plan    Pseudohyponatremia / Hypovolemic  Continue medical management and monitor      Essential hypertension- (present on admission)   Assessment & Plan    Will resume PO regimen when clinically better     Dyslipidemia- (present on admission)   Assessment & Plan    Resume oral regimen once able to tolerate     Leukemoid reaction- (present on admission)   Assessment & Plan    Reactive from DKA        Plan  Improved eventually now, tolerating diet, close watch on insulin and glucose levels  Frequent blood sugar checks to  continue  Additional medications ordered for emesis control, trial of erythromycin, Marinol  Follow electrolytes closely and replace  Symptom control  Follow labs closely  See orders  Critically ill but stabilized for downgrade if remains stable  I have performed a physical exam and reviewed and updated ROS and Plan today . In review of yesterday's note , there are no changes except as documented above.     VTE prophylaxis: Contraindicated chemically, SCD

## 2019-01-14 LAB
ALBUMIN SERPL BCP-MCNC: 3.2 G/DL (ref 3.2–4.9)
ALBUMIN/GLOB SERPL: 2 G/DL
ALP SERPL-CCNC: 67 U/L (ref 30–99)
ALT SERPL-CCNC: 36 U/L (ref 2–50)
ANION GAP SERPL CALC-SCNC: 6 MMOL/L (ref 0–11.9)
AST SERPL-CCNC: 33 U/L (ref 12–45)
B-OH-BUTYR SERPL-MCNC: 0.08 MMOL/L (ref 0.02–0.27)
BASOPHILS # BLD AUTO: 0.5 % (ref 0–1.8)
BASOPHILS # BLD: 0.02 K/UL (ref 0–0.12)
BILIRUB SERPL-MCNC: 0.5 MG/DL (ref 0.1–1.5)
BUN SERPL-MCNC: 5 MG/DL (ref 8–22)
CALCIUM SERPL-MCNC: 8.6 MG/DL (ref 8.5–10.5)
CHLORIDE SERPL-SCNC: 107 MMOL/L (ref 96–112)
CO2 SERPL-SCNC: 28 MMOL/L (ref 20–33)
CREAT SERPL-MCNC: 0.47 MG/DL (ref 0.5–1.4)
EOSINOPHIL # BLD AUTO: 0.21 K/UL (ref 0–0.51)
EOSINOPHIL NFR BLD: 5.2 % (ref 0–6.9)
ERYTHROCYTE [DISTWIDTH] IN BLOOD BY AUTOMATED COUNT: 40.6 FL (ref 35.9–50)
GLOBULIN SER CALC-MCNC: 1.6 G/DL (ref 1.9–3.5)
GLUCOSE BLD-MCNC: 123 MG/DL (ref 65–99)
GLUCOSE BLD-MCNC: 124 MG/DL (ref 65–99)
GLUCOSE BLD-MCNC: 205 MG/DL (ref 65–99)
GLUCOSE BLD-MCNC: 246 MG/DL (ref 65–99)
GLUCOSE BLD-MCNC: 247 MG/DL (ref 65–99)
GLUCOSE SERPL-MCNC: 140 MG/DL (ref 65–99)
HCT VFR BLD AUTO: 33.6 % (ref 37–47)
HGB BLD-MCNC: 11.4 G/DL (ref 12–16)
IMM GRANULOCYTES # BLD AUTO: 0.01 K/UL (ref 0–0.11)
IMM GRANULOCYTES NFR BLD AUTO: 0.2 % (ref 0–0.9)
LYMPHOCYTES # BLD AUTO: 2 K/UL (ref 1–4.8)
LYMPHOCYTES NFR BLD: 49.4 % (ref 22–41)
MAGNESIUM SERPL-MCNC: 1.8 MG/DL (ref 1.5–2.5)
MCH RBC QN AUTO: 29.3 PG (ref 27–33)
MCHC RBC AUTO-ENTMCNC: 33.9 G/DL (ref 33.6–35)
MCV RBC AUTO: 86.4 FL (ref 81.4–97.8)
MONOCYTES # BLD AUTO: 0.37 K/UL (ref 0–0.85)
MONOCYTES NFR BLD AUTO: 9.1 % (ref 0–13.4)
NEUTROPHILS # BLD AUTO: 1.44 K/UL (ref 2–7.15)
NEUTROPHILS NFR BLD: 35.6 % (ref 44–72)
NRBC # BLD AUTO: 0 K/UL
NRBC BLD-RTO: 0 /100 WBC
PHOSPHATE SERPL-MCNC: 4.1 MG/DL (ref 2.5–4.5)
PLATELET # BLD AUTO: 153 K/UL (ref 164–446)
PMV BLD AUTO: 10.1 FL (ref 9–12.9)
POTASSIUM SERPL-SCNC: 3.7 MMOL/L (ref 3.6–5.5)
PROT SERPL-MCNC: 4.8 G/DL (ref 6–8.2)
RBC # BLD AUTO: 3.89 M/UL (ref 4.2–5.4)
SODIUM SERPL-SCNC: 141 MMOL/L (ref 135–145)
WBC # BLD AUTO: 4.1 K/UL (ref 4.8–10.8)

## 2019-01-14 PROCEDURE — 83735 ASSAY OF MAGNESIUM: CPT

## 2019-01-14 PROCEDURE — 770001 HCHG ROOM/CARE - MED/SURG/GYN PRIV*

## 2019-01-14 PROCEDURE — A9270 NON-COVERED ITEM OR SERVICE: HCPCS | Performed by: INTERNAL MEDICINE

## 2019-01-14 PROCEDURE — A9270 NON-COVERED ITEM OR SERVICE: HCPCS | Performed by: HOSPITALIST

## 2019-01-14 PROCEDURE — 700102 HCHG RX REV CODE 250 W/ 637 OVERRIDE(OP): Performed by: INTERNAL MEDICINE

## 2019-01-14 PROCEDURE — 82010 KETONE BODYS QUAN: CPT

## 2019-01-14 PROCEDURE — 99232 SBSQ HOSP IP/OBS MODERATE 35: CPT | Performed by: HOSPITALIST

## 2019-01-14 PROCEDURE — 85025 COMPLETE CBC W/AUTO DIFF WBC: CPT

## 2019-01-14 PROCEDURE — 99233 SBSQ HOSP IP/OBS HIGH 50: CPT | Performed by: INTERNAL MEDICINE

## 2019-01-14 PROCEDURE — 80053 COMPREHEN METABOLIC PANEL: CPT

## 2019-01-14 PROCEDURE — 700111 HCHG RX REV CODE 636 W/ 250 OVERRIDE (IP): Performed by: INTERNAL MEDICINE

## 2019-01-14 PROCEDURE — 700102 HCHG RX REV CODE 250 W/ 637 OVERRIDE(OP): Performed by: HOSPITALIST

## 2019-01-14 PROCEDURE — 84100 ASSAY OF PHOSPHORUS: CPT

## 2019-01-14 PROCEDURE — 82962 GLUCOSE BLOOD TEST: CPT | Mod: 91

## 2019-01-14 RX ORDER — ERYTHROMYCIN 250 MG/1
250 TABLET, DELAYED RELEASE ORAL
Status: DISCONTINUED | OUTPATIENT
Start: 2019-01-14 | End: 2019-01-15 | Stop reason: HOSPADM

## 2019-01-14 RX ORDER — POTASSIUM CHLORIDE 20 MEQ/1
40 TABLET, EXTENDED RELEASE ORAL DAILY
Status: DISCONTINUED | OUTPATIENT
Start: 2019-01-15 | End: 2019-01-15

## 2019-01-14 RX ORDER — FUROSEMIDE 40 MG/1
20 TABLET ORAL
Status: DISCONTINUED | OUTPATIENT
Start: 2019-01-15 | End: 2019-01-15

## 2019-01-14 RX ORDER — METOCLOPRAMIDE 10 MG/1
10 TABLET ORAL
Status: DISCONTINUED | OUTPATIENT
Start: 2019-01-14 | End: 2019-01-15 | Stop reason: HOSPADM

## 2019-01-14 RX ADMIN — DRONABINOL 5 MG: 5 CAPSULE ORAL at 12:55

## 2019-01-14 RX ADMIN — DRONABINOL 5 MG: 5 CAPSULE ORAL at 17:52

## 2019-01-14 RX ADMIN — OMEPRAZOLE 20 MG: 20 CAPSULE, DELAYED RELEASE ORAL at 05:19

## 2019-01-14 RX ADMIN — METOCLOPRAMIDE 10 MG: 5 INJECTION, SOLUTION INTRAMUSCULAR; INTRAVENOUS at 05:16

## 2019-01-14 RX ADMIN — INSULIN GLARGINE 20 UNITS: 100 INJECTION, SOLUTION SUBCUTANEOUS at 05:27

## 2019-01-14 RX ADMIN — INSULIN HUMAN 4 UNITS: 100 INJECTION, SOLUTION PARENTERAL at 20:50

## 2019-01-14 RX ADMIN — ERYTHROMYCIN ETHYLSUCCINATE 200 MG: 200 SUSPENSION ORAL at 12:55

## 2019-01-14 RX ADMIN — INSULIN HUMAN 4 UNITS: 100 INJECTION, SOLUTION PARENTERAL at 18:46

## 2019-01-14 RX ADMIN — METOCLOPRAMIDE HYDROCHLORIDE 10 MG: 10 TABLET ORAL at 12:55

## 2019-01-14 RX ADMIN — METOCLOPRAMIDE HYDROCHLORIDE 10 MG: 10 TABLET ORAL at 20:49

## 2019-01-14 RX ADMIN — STANDARDIZED SENNA CONCENTRATE AND DOCUSATE SODIUM 2 TABLET: 8.6; 5 TABLET, FILM COATED ORAL at 05:19

## 2019-01-14 RX ADMIN — INSULIN HUMAN 4 UNITS: 100 INJECTION, SOLUTION PARENTERAL at 13:40

## 2019-01-14 RX ADMIN — ERYTHROMYCIN 250 MG: 250 TABLET, DELAYED RELEASE ORAL at 20:49

## 2019-01-14 RX ADMIN — INSULIN GLARGINE 20 UNITS: 100 INJECTION, SOLUTION SUBCUTANEOUS at 18:46

## 2019-01-14 RX ADMIN — STANDARDIZED SENNA CONCENTRATE AND DOCUSATE SODIUM 2 TABLET: 8.6; 5 TABLET, FILM COATED ORAL at 17:52

## 2019-01-14 RX ADMIN — ERYTHROMYCIN ETHYLSUCCINATE 200 MG: 200 SUSPENSION ORAL at 05:14

## 2019-01-14 RX ADMIN — METOCLOPRAMIDE HYDROCHLORIDE 10 MG: 10 TABLET ORAL at 17:52

## 2019-01-14 ASSESSMENT — ENCOUNTER SYMPTOMS
NAUSEA: 1
CONSTIPATION: 1
PALPITATIONS: 0
SHORTNESS OF BREATH: 0
SHORTNESS OF BREATH: 1
HEADACHES: 0
CARDIOVASCULAR NEGATIVE: 1
DEPRESSION: 0
NECK PAIN: 0
DIZZINESS: 0
NERVOUS/ANXIOUS: 1
HEARTBURN: 0
COUGH: 0
VOMITING: 0
POLYDIPSIA: 0
FEVER: 0
MUSCULOSKELETAL NEGATIVE: 1
BRUISES/BLEEDS EASILY: 0
DOUBLE VISION: 0
BACK PAIN: 0
EYES NEGATIVE: 1
FLANK PAIN: 0
CHILLS: 0
ABDOMINAL PAIN: 0
NAUSEA: 0
FOCAL WEAKNESS: 0
WEAKNESS: 0

## 2019-01-14 ASSESSMENT — PAIN SCALES - GENERAL
PAINLEVEL_OUTOF10: 0
PAINLEVEL_OUTOF10: 0

## 2019-01-14 NOTE — PROGRESS NOTES
Hospital Medicine Daily Progress Note    Date of Service  1/14/2019    Chief Complaint  28 y.o. female admitted 1/9/2019 with onset of nausea vomiting and admitted with DKA.  Reports some coffee-ground emesis after multiple episodes of nausea vomiting.  Admitted for aggressive insulin management.    Hospital Course    28-year-old with gastroparesis, diabetes type 1 admitted for DKA      Interval Problem Update  Patient seen and examined today. ICU Care  Care and plan discussed in IDT/Hot rounds.  Lines and assistive devices reviewed.    Patient tolerating treatment and therapies.  All Data, Medication data reviewed.  Case discussed with nursing as available.  Plan of Care reviewed with patient and notified of changes.  1/10 the patient remains on insulin protocol, received IV fluids, anion gap is improving but CO2 remains low, the patient continues to have nausea vomiting, recent diagnosis of gastroparesis, no further GI bleeding noted, hemoglobin stable.  1/11 patient still with significant nausea, unable to eat anything still, transition from insulin drip to long-acting insulin with glucose infusion ongoing while with poor p.o. intake, no further hematemesis or bleeding noted, no abdominal pain, feels overall better  1/12 patient still with nausea and poor p.o. intake, has received significant amount of fluid, continues on dextrose drip for glycemic support while on long-acting insulin, seems lethargic and poorly motivated, is trying to eat, started on erythromycin for prokinetic purposes, getting ultrasound of the abdomen to rule out abnormality of the renal system and gallbladder possibly  1/13 patient is much improved, was able to eat lunch, replacing electrolytes, on oxygen, received significant volume over the course of the hospitalization, started erythromycin and Marinol, question if this had an effect on her severe nausea vomiting  1/14 the patient is improving, okay p.o. intake, desaturates at night  without oxygen, no bowel movement yet, okay for medical transfer, afebrile, denies depression or lack of motivation at home to take care of her diabetes  Consultants/Specialty  Intensivist    Code Status  Full code    Disposition  ICU okay for transfer    Review of Systems  Review of Systems   Constitutional: Negative for chills and fever.   HENT: Negative.    Eyes: Negative.    Respiratory: Positive for shortness of breath. Negative for cough.    Cardiovascular: Negative.  Negative for chest pain and palpitations.   Gastrointestinal: Positive for nausea. Negative for heartburn.   Genitourinary: Negative.  Negative for dysuria and frequency.   Musculoskeletal: Negative.  Negative for back pain and neck pain.   Skin: Negative.  Negative for itching and rash.   Neurological: Negative for dizziness, focal weakness and headaches.   Endo/Heme/Allergies: Negative.  Negative for polydipsia. Does not bruise/bleed easily.   Psychiatric/Behavioral: Negative for depression. The patient is nervous/anxious.         Physical Exam  Temp:  [36.4 °C (97.5 °F)-37.1 °C (98.8 °F)] 36.6 °C (97.9 °F)  Pulse:  [] 84  Resp:  [13-23] 19    Physical Exam   Constitutional: She is oriented to person, place, and time. She appears well-developed and well-nourished. She appears lethargic. She has a sickly appearance.   HENT:   Head: Normocephalic and atraumatic.   Nose: Nose normal.   Mouth/Throat: Oropharynx is clear and moist.   Eyes: Pupils are equal, round, and reactive to light. Conjunctivae and EOM are normal.   Neck: Normal range of motion. Neck supple. No JVD present. No thyromegaly present.   Cardiovascular: Normal rate, regular rhythm and normal heart sounds.  Exam reveals no gallop and no friction rub.    Pulses:       Dorsalis pedis pulses are 2+ on the right side, and 2+ on the left side.   Capillary refill <3 secs   Pulmonary/Chest: Effort normal and breath sounds normal. She has no wheezes. She has no rales.   Abdominal:  Soft. Bowel sounds are normal. She exhibits no distension and no mass. There is no rebound and no guarding.   Musculoskeletal: Normal range of motion. She exhibits no edema, tenderness or deformity.   Lymphadenopathy:     She has no cervical adenopathy.   Neurological: She is oriented to person, place, and time. She appears lethargic. No cranial nerve deficit. Coordination normal.   Skin: Skin is warm and dry. She is not diaphoretic. No cyanosis. There is pallor.   Psychiatric: She has a normal mood and affect. Her behavior is normal. Judgment and thought content normal.   Nursing note and vitals reviewed.      Fluids    Intake/Output Summary (Last 24 hours) at 01/14/19 0716  Last data filed at 01/14/19 0600   Gross per 24 hour   Intake             1600 ml   Output             2500 ml   Net             -900 ml       Laboratory  Recent Labs      01/13/19 0449 01/14/19   0522   WBC  3.9*  4.1*   RBC  3.74*  3.89*   HEMOGLOBIN  11.2*  11.4*   HEMATOCRIT  32.6*  33.6*   MCV  87.2  86.4   MCH  29.9  29.3   MCHC  34.4  33.9   RDW  41.1  40.6   PLATELETCT  162*  153*   MPV  9.9  10.1     Recent Labs      01/12/19   0535  01/13/19   0449  01/14/19   0522   SODIUM  135  140  141   POTASSIUM  3.3*  3.3*  3.7   CHLORIDE  104  107  107   CO2  23  29  28   GLUCOSE  290*  107*  140*   BUN  <3*  <3*  5*   CREATININE  0.49*  0.41*  0.47*   CALCIUM  8.4*  8.2*  8.6                   Imaging  US-ABDOMEN COMPLETE SURVEY   Final Result         1.  Mild right hydronephrosis.   2.  Echogenic lesion in the right hepatic lobe, appearance suggests a hemangioma, otherwise indeterminate. Not visualized on prior study. Follow-up evaluation with three-phase CT of the liver for more definitive characterization.   3.  Mild splenomegaly.   4.  Hepatomegaly         IR-MIDLINE CATHETER INSERTION W/ GUIDANCE > AGE 5   Final Result                  Ultrasound-guided midline placement performed by qualified nursing staff    as above.           DX-CHEST-PORTABLE (1 VIEW)   Final Result         1.  No acute cardiopulmonary disease.   2.  Right internal jugular central line is seen within the right atrium, could be withdrawn 4.7 cm.      DX-CHEST-PORTABLE (1 VIEW)   Final Result      Right internal jugular line tip overlies the SVC. No pneumothorax.      [id number] VIEW   Final Result      Constipation pattern of the colon.      DX-CHEST-LIMITED (1 VIEW)   Final Result      No acute cardiopulmonary disease.           Assessment/Plan  * Upper GI bleeding   Assessment & Plan    On PPI  Possibly from nausea vomiting and esophageal tear  Monitor closely     Diabetic ketoacidosis (HCC)- (present on admission)   Assessment & Plan    ICU care  DKA protocol  Aggressive volume resuscitation  Titrate off insulin drip once the patient is able to take p.o.     Metabolic acidosis- (present on admission)   Assessment & Plan    Secondary to DKA  Adjusting  Monitor resuscitation     Type 1 diabetes mellitus (HCC)- (present on admission)   Assessment & Plan    In DKA  Might benefit from diabetic education     Intractable nausea and vomiting- (present on admission)   Assessment & Plan    Appears related to DKA / diabetic gastroparesis  C/O coffee ground emesis  IV protonix 80 mg followed by 8 mg / hour   If hematemesis recurrent, GI consultation  X 1 Zofran / IV Reglan scheduled   GI cocktail / Sucralfate  Emesis control  Mainstay will be glycemic control long-term     Gastroparesis- (present on admission)   Assessment & Plan    Recent diagnosis with significant prolonged gastric emptying time  On Reglan  Mainstay will be aggressive glycemic control     Essential hypertension- (present on admission)   Assessment & Plan    Will resume PO regimen when clinically better     Dyslipidemia- (present on admission)   Assessment & Plan    Resume oral regimen once able to tolerate     Leukemoid reaction- (present on admission)   Assessment & Plan    Reactive from DKA         Plan  Improved eventually now, tolerating diet, close watch on insulin and glucose levels  Frequent blood sugar checks to continue  Additional medications ordered for emesis control, trial of erythromycin, Marinol  Follow electrolytes closely and replace  Symptom control  Follow labs closely  See orders  Critically ill but stabilized for downgrade if remains stable  I have performed a physical exam and reviewed and updated ROS and Plan today . In review of yesterday's note , there are no changes except as documented above.     VTE prophylaxis: Contraindicated chemically, SCD

## 2019-01-14 NOTE — DISCHARGE PLANNING
Care Transition Team Assessment    Information for this assessment was compiled from a bedside interview with the pt as well as chart information. The reports living in a [address] single family house with her parents. The pt reports receiving her primary care through the [facility]. The pt reports being completely independent with her ADL/IADLs prior to being admitted. The pt reports previous dx of, ADHD. The pt states she no longer tkes medications and does not seem to have any further issues. There are no issues with drug, alcohol, or leaving AMA. DC needs are not known at the time of this assessment.     Information Source  Orientation : Oriented x 4  Information Given By: Patient  Who is responsible for making decisions for patient? : Patient    Readmission Evaluation  Is this a readmission?: Yes - unplanned readmission  Why do you think you were readmitted?: DKA  Was an appointment arranged for you prior to discharge?: Yes, attended appointment  Were there new prescriptions you were supposed to fill after you were discharged?: Yes, prescriptions filled  Did you understand your discharge instructions?: Yes  Did you have enough support after your last discharge?: Yes    Elopement Risk  Legal Hold: No  Ambulatory or Self Mobile in Wheelchair: No-Not an Elopement Risk  Elopement Risk: Not at Risk for Elopement    Interdisciplinary Discharge Planning  Patient or legal guardian wants to designate a caregiver (see row info): No    Discharge Preparedness  What is your plan after discharge?: Uncertain - pending medical team collaboration  What are your discharge supports?: Parent  Prior Functional Level: Ambulatory, Independent with Activities of Daily Living, Independent with Medication Management  Difficulity with ADLs: None  Difficulity with IADLs: None    Functional Assesment  Prior Functional Level: Ambulatory, Independent with Activities of Daily Living, Independent with Medication  Management    Finances  Financial Barriers to Discharge: No  Prescription Coverage: Yes              Advance Directive  Advance Directive?: None  Advance Directive offered?: AD Booklet refused    Domestic Abuse  Have you ever been the victim of abuse or violence?: No    Psychological Assessment  History of Substance Abuse: None  History of Psychiatric Problems: Yes (ADHD 10-12 years ago)  Non-compliant with Treatment: No  Newly Diagnosed Illness: No    Discharge Risks or Barriers  Discharge risks or barriers?: No  Patient risk factors: Mental health    Anticipated Discharge Information  Anticipated discharge disposition: Discharge needs currently unknown

## 2019-01-14 NOTE — PROGRESS NOTES
Noted blood stains on patient's sheets where she was sitting.  Patient states she is having her menses although it is early.  Provided patient with hospital underwear and yariel pads.  Performed yariel-care.

## 2019-01-14 NOTE — PROGRESS NOTES
Critical Care Progress Note    Date of admission  1/9/2019    Chief Complaint  28 y.o. female with hx of DM type 1 c/b neuropathy, retinopathy, and gastroparesis, hx of recurrent hospitalization for DKA recently in 12/2018 who now came back to ED after been having nausea and vomiting. Pt noted coffee ground emesis. At ED, pt was not hypotensive, Hgb 13-14s, BG in 400s, + beta hydroxybutyrate, pH 7.16. Protonix gtt started. 3L fluid boluses given. Insulin gtt started. No hypotension or tachycardia. Pt is alert, oriented. Appears ill.     Hospital Course    1/9 admitted for dka      Interval Problem Update  Reviewed last 24 hour events:   - slept well with possible desaturation overnight   - luis armando diet   - improved N/V this morning, no BM yet on erythromycin/reglan   - d/c CVL   - awaiting transfer   - WBC remains low, AF   - plts down to 153   - started on marinol yesterday for appetite    Review of Systems  Review of Systems   Constitutional: Negative for chills and fever.   HENT: Negative for nosebleeds.    Eyes: Negative for double vision.   Respiratory: Negative for cough and shortness of breath.    Cardiovascular: Negative for chest pain and leg swelling.   Gastrointestinal: Positive for constipation. Negative for abdominal pain, nausea and vomiting.   Genitourinary: Negative for dysuria and flank pain.   Musculoskeletal: Negative for neck pain.   Skin: Negative for rash.   Neurological: Negative for dizziness, weakness and headaches.   Endo/Heme/Allergies: Negative for polydipsia.   Psychiatric/Behavioral: Negative for depression and suicidal ideas.   All other systems reviewed and are negative.       Vital Signs for last 24 hours   Temp:  [36.4 °C (97.5 °F)-37.1 °C (98.8 °F)] 36.6 °C (97.9 °F)  Pulse:  [] 84  Resp:  [13-23] 19    Respiratory   3 lpm n/c with SaO2 94-97%    Physical Exam   Physical Exam   Constitutional: She is oriented to person, place, and time. She appears well-developed and  well-nourished. No distress.   Resting comfortably in bed, tolerating diet   HENT:   Head: Normocephalic and atraumatic.   Nose: Nose normal.   Mouth/Throat: Oropharynx is clear and moist.   Eyes: Pupils are equal, round, and reactive to light. Conjunctivae are normal. No scleral icterus.   Neck: Neck supple. No JVD present. No tracheal deviation present.   Cardiovascular: Normal rate, regular rhythm, normal heart sounds and intact distal pulses.    No murmur heard.  Pulmonary/Chest: Effort normal and breath sounds normal. No respiratory distress. She has no wheezes.   Abdominal: Soft. Bowel sounds are normal. She exhibits distension. There is no tenderness. There is no rebound and no guarding.   Musculoskeletal: She exhibits no edema or tenderness.   Lymphadenopathy:     She has no cervical adenopathy.   Neurological: She is alert and oriented to person, place, and time. No cranial nerve deficit. She exhibits normal muscle tone.   Skin: Skin is warm and dry. No rash noted. No erythema. No pallor.   Psychiatric: She has a normal mood and affect. Her behavior is normal. Judgment and thought content normal.   Nursing note and vitals reviewed.      Medications  Current Facility-Administered Medications   Medication Dose Route Frequency Provider Last Rate Last Dose   • insulin glargine (LANTUS) injection 20 Units  20 Units Subcutaneous BID INSULIN Sheng Campuzano M.D.   20 Units at 01/14/19 0527   • dronabinol (MARINOL) capsule 5 mg  5 mg Oral BEFORE LUNCH AND DINNER Sheng Campuzano M.D.   5 mg at 01/13/19 1727   • insulin regular (HUMULIN R) injection 3-14 Units  3-14 Units Subcutaneous 4X/DAY ACHS Aníbal Gomez M.D.   Stopped at 01/12/19 1700    And   • glucose 4 g chewable tablet 16 g  16 g Oral Q15 MIN PRN Aníbal Gomez M.D.        And   • dextrose 50% (D50W) injection 25 mL  25 mL Intravenous Q15 MIN PRN Aníbal Gomez M.D.       • D5LR infusion   Intravenous Continuous Aníbal Gomez M.D. 50 mL/hr at  01/13/19 2330     • erythromycin ethylsuccinate 200 mg/5 mL (E.E.S.) suspension 200 mg  200 mg Oral Q6HRS Sheng Campuzano M.D.   200 mg at 01/14/19 0514   • omeprazole (PRILOSEC) capsule 20 mg  20 mg Oral DAILY Aníbal Gomez M.D.   20 mg at 01/14/19 0519   • LORazepam (ATIVAN) injection 0.5-1 mg  0.5-1 mg Intravenous Q4HRS PRN Sheng Campuzano M.D.   1 mg at 01/12/19 1756   • prochlorperazine (COMPAZINE) suppository 25 mg  25 mg Rectal Q12HRS PRRONALD Campuzano M.D.       • prochlorperazine (COMPAZINE) tablet 10 mg  10 mg Oral Q6HRS PRRONALD Campuzano M.D.       • metoclopramide (REGLAN) injection 10 mg  10 mg Intravenous Q6HRS Chris Street M.D.   10 mg at 01/14/19 0516   • senna-docusate (PERICOLACE or SENOKOT S) 8.6-50 MG per tablet 2 Tab  2 Tab Oral BID Chris Street M.D.   2 Tab at 01/14/19 0519    And   • polyethylene glycol/lytes (MIRALAX) PACKET 1 Packet  1 Packet Oral QDAY PRN Chris Street M.D.   1 Packet at 01/13/19 1244    And   • magnesium hydroxide (MILK OF MAGNESIA) suspension 30 mL  30 mL Oral QDAY PRN Chris Street M.D.        And   • bisacodyl (DULCOLAX) suppository 10 mg  10 mg Rectal QDAY PRN Chris Street M.D.       • Respiratory Care per Protocol   Nebulization Continuous RT Chris Street M.D.       • ondansetron (ZOFRAN) syringe/vial injection 4 mg  4 mg Intravenous Q4HRS PRN Chris Street M.D.   4 mg at 01/10/19 2234   • ondansetron (ZOFRAN ODT) dispertab 4 mg  4 mg Oral Q4HRS PRN Chris Street M.D.   4 mg at 01/09/19 1610   • acetaminophen (TYLENOL) tablet 650 mg  650 mg Oral Q6HRS PRN Chris Street M.D.   650 mg at 01/13/19 1957       Fluids    Intake/Output Summary (Last 24 hours) at 01/14/19 0742  Last data filed at 01/14/19 0600   Gross per 24 hour   Intake             1600 ml   Output             2500 ml   Net             -900 ml       Laboratory          Recent Labs      01/12/19   0535  01/13/19   0449  01/14/19   0522   SODIUM  135  140  141   POTASSIUM  3.3*  3.3*  3.7    CHLORIDE  104  107  107   CO2  23  29  28   BUN  <3*  <3*  5*   CREATININE  0.49*  0.41*  0.47*   MAGNESIUM   --   1.7  1.8   PHOSPHORUS   --   3.0  4.1   CALCIUM  8.4*  8.2*  8.6     Recent Labs      01/12/19   0535  01/13/19 0449 01/14/19 0522   ALTSGPT   --    --   36   ASTSGOT   --    --   33   ALKPHOSPHAT   --    --   67   TBILIRUBIN   --    --   0.5   GLUCOSE  290*  107*  140*     Recent Labs      01/13/19 0449 01/14/19 0522   WBC  3.9*  4.1*   NEUTSPOLYS   --   35.60*   LYMPHOCYTES   --   49.40*   MONOCYTES   --   9.10   EOSINOPHILS   --   5.20   BASOPHILS   --   0.50   ASTSGOT   --   33   ALTSGPT   --   36   ALKPHOSPHAT   --   67   TBILIRUBIN   --   0.5     Recent Labs      01/13/19 0449 01/14/19 0522   RBC  3.74*  3.89*   HEMOGLOBIN  11.2*  11.4*   HEMATOCRIT  32.6*  33.6*   PLATELETCT  162*  153*       Imaging  X-Ray:  I have personally reviewed the images and compared with prior images. and My impression is: 1/12 showing no acute cardia pulmonary process with a right IJ central venous catheter in good position    Assessment/Plan  * Upper GI bleeding   Assessment & Plan    Secondary to Virgen-Babin tear  Proton pump inhibitor  Daily CBC     Diabetic ketoacidosis (HCC)- (present on admission)   Assessment & Plan    Status post DKA protocol  Continue long and short acting insulin  Diabetes education, diabetic diet  Discontinue IV fluids       Metabolic acidosis- (present on admission)   Assessment & Plan    Secondary to DKA -resolved     Type 1 diabetes mellitus (HCC)- (present on admission)   Assessment & Plan    With recurrent DKA secondary to poor compliance, brittle diabetes  Continue long and short acting insulin  Diabetic education, social work evaluation for resources       Intractable nausea and vomiting- (present on admission)   Assessment & Plan    Improving  Continue Marinol, Zofran, Reglan       Gastroparesis- (present on admission)   Assessment & Plan    Improving  Continue  erythromycin and Reglan  Bowel protocol     Thrombocytopenia (HCC)- (present on admission)   Assessment & Plan    Worsening, no sign of bleeding  Monitor     Hypoxia   Assessment & Plan    Unchanged  Encourage incentive spirometry, Pap, out of bed to chair/ambulation     Essential hypertension- (present on admission)   Assessment & Plan    Controlled  Monitor off antihypertensives for now     Dyslipidemia- (present on admission)   Assessment & Plan    Continue statin     Full code    VTE:  Contraindicated -Virgen-Babin tear  Ulcer: PPI  Lines: None    I have performed a physical exam and reviewed and updated ROS and Plan today (1/14/2019). In review of yesterday's note (1/13/2019), there are no changes except as documented above.     Discussed patient condition and risk of morbidity and/or mortality with RN, RT, Pharmacy, , Code status disscussed, Charge nurse / hot rounds, Patient and Dr. Campuzano

## 2019-01-14 NOTE — CARE PLAN
Problem: Safety  Goal: Will remain free from injury  Patient educated on the use of bed alarm and call light, fall prevention education. Patient verbalizes and demonstrates an understanding.    Problem: Knowledge Deficit  Goal: Knowledge of disease process/condition, treatment plan, diagnostic tests, and medications will improve  Patient educated on treatment plan, medications, diagnostic testing, disease process. Questions regarding plan of care answered, patient verbalizes an understanding.

## 2019-01-15 VITALS
TEMPERATURE: 97.8 F | RESPIRATION RATE: 18 BRPM | BODY MASS INDEX: 31.2 KG/M2 | SYSTOLIC BLOOD PRESSURE: 115 MMHG | WEIGHT: 182.76 LBS | DIASTOLIC BLOOD PRESSURE: 78 MMHG | HEART RATE: 85 BPM | OXYGEN SATURATION: 96 % | HEIGHT: 64 IN

## 2019-01-15 PROBLEM — E11.10 DIABETIC KETOACIDOSIS (HCC): Status: RESOLVED | Noted: 2018-04-08 | Resolved: 2019-01-15

## 2019-01-15 PROBLEM — E87.20 METABOLIC ACIDOSIS: Status: RESOLVED | Noted: 2017-09-22 | Resolved: 2019-01-15

## 2019-01-15 PROBLEM — R09.02 HYPOXIA: Status: RESOLVED | Noted: 2019-01-13 | Resolved: 2019-01-15

## 2019-01-15 PROBLEM — K92.2 UPPER GI BLEEDING: Status: RESOLVED | Noted: 2019-01-09 | Resolved: 2019-01-15

## 2019-01-15 LAB
ALBUMIN SERPL BCP-MCNC: 3.3 G/DL (ref 3.2–4.9)
ALBUMIN/GLOB SERPL: 1.7 G/DL
ALP SERPL-CCNC: 81 U/L (ref 30–99)
ALT SERPL-CCNC: 27 U/L (ref 2–50)
ANION GAP SERPL CALC-SCNC: 9 MMOL/L (ref 0–11.9)
AST SERPL-CCNC: 18 U/L (ref 12–45)
BASOPHILS # BLD AUTO: 0.5 % (ref 0–1.8)
BASOPHILS # BLD: 0.03 K/UL (ref 0–0.12)
BILIRUB SERPL-MCNC: 0.5 MG/DL (ref 0.1–1.5)
BNP SERPL-MCNC: 83 PG/ML (ref 0–100)
BUN SERPL-MCNC: 8 MG/DL (ref 8–22)
CALCIUM SERPL-MCNC: 9 MG/DL (ref 8.5–10.5)
CHLORIDE SERPL-SCNC: 105 MMOL/L (ref 96–112)
CO2 SERPL-SCNC: 25 MMOL/L (ref 20–33)
CREAT SERPL-MCNC: 0.47 MG/DL (ref 0.5–1.4)
EOSINOPHIL # BLD AUTO: 0.26 K/UL (ref 0–0.51)
EOSINOPHIL NFR BLD: 4.2 % (ref 0–6.9)
ERYTHROCYTE [DISTWIDTH] IN BLOOD BY AUTOMATED COUNT: 39.1 FL (ref 35.9–50)
GLOBULIN SER CALC-MCNC: 1.9 G/DL (ref 1.9–3.5)
GLUCOSE BLD-MCNC: 115 MG/DL (ref 65–99)
GLUCOSE BLD-MCNC: 204 MG/DL (ref 65–99)
GLUCOSE BLD-MCNC: 315 MG/DL (ref 65–99)
GLUCOSE SERPL-MCNC: 119 MG/DL (ref 65–99)
HCT VFR BLD AUTO: 34.2 % (ref 37–47)
HGB BLD-MCNC: 11.7 G/DL (ref 12–16)
IMM GRANULOCYTES # BLD AUTO: 0.01 K/UL (ref 0–0.11)
IMM GRANULOCYTES NFR BLD AUTO: 0.2 % (ref 0–0.9)
LYMPHOCYTES # BLD AUTO: 2.08 K/UL (ref 1–4.8)
LYMPHOCYTES NFR BLD: 33.8 % (ref 22–41)
MCH RBC QN AUTO: 29.2 PG (ref 27–33)
MCHC RBC AUTO-ENTMCNC: 34.2 G/DL (ref 33.6–35)
MCV RBC AUTO: 85.3 FL (ref 81.4–97.8)
MONOCYTES # BLD AUTO: 0.55 K/UL (ref 0–0.85)
MONOCYTES NFR BLD AUTO: 8.9 % (ref 0–13.4)
NEUTROPHILS # BLD AUTO: 3.23 K/UL (ref 2–7.15)
NEUTROPHILS NFR BLD: 52.4 % (ref 44–72)
NRBC # BLD AUTO: 0 K/UL
NRBC BLD-RTO: 0 /100 WBC
PLATELET # BLD AUTO: 189 K/UL (ref 164–446)
PMV BLD AUTO: 10.2 FL (ref 9–12.9)
POTASSIUM SERPL-SCNC: 3.6 MMOL/L (ref 3.6–5.5)
PROT SERPL-MCNC: 5.2 G/DL (ref 6–8.2)
RBC # BLD AUTO: 4.01 M/UL (ref 4.2–5.4)
SODIUM SERPL-SCNC: 139 MMOL/L (ref 135–145)
WBC # BLD AUTO: 6.2 K/UL (ref 4.8–10.8)

## 2019-01-15 PROCEDURE — A9270 NON-COVERED ITEM OR SERVICE: HCPCS | Performed by: INTERNAL MEDICINE

## 2019-01-15 PROCEDURE — 700102 HCHG RX REV CODE 250 W/ 637 OVERRIDE(OP): Performed by: HOSPITALIST

## 2019-01-15 PROCEDURE — 85025 COMPLETE CBC W/AUTO DIFF WBC: CPT

## 2019-01-15 PROCEDURE — 82962 GLUCOSE BLOOD TEST: CPT | Mod: 91

## 2019-01-15 PROCEDURE — 83880 ASSAY OF NATRIURETIC PEPTIDE: CPT

## 2019-01-15 PROCEDURE — 700102 HCHG RX REV CODE 250 W/ 637 OVERRIDE(OP): Performed by: INTERNAL MEDICINE

## 2019-01-15 PROCEDURE — 80053 COMPREHEN METABOLIC PANEL: CPT

## 2019-01-15 PROCEDURE — 99238 HOSP IP/OBS DSCHRG MGMT 30/<: CPT | Performed by: HOSPITALIST

## 2019-01-15 PROCEDURE — 99233 SBSQ HOSP IP/OBS HIGH 50: CPT | Performed by: INTERNAL MEDICINE

## 2019-01-15 RX ORDER — ERYTHROMYCIN 250 MG/1
250 TABLET, DELAYED RELEASE ORAL
Qty: 120 TAB | Refills: 1 | Status: ON HOLD | OUTPATIENT
Start: 2019-01-15 | End: 2019-03-26

## 2019-01-15 RX ADMIN — METOCLOPRAMIDE HYDROCHLORIDE 10 MG: 10 TABLET ORAL at 17:13

## 2019-01-15 RX ADMIN — METOCLOPRAMIDE HYDROCHLORIDE 10 MG: 10 TABLET ORAL at 06:23

## 2019-01-15 RX ADMIN — ERYTHROMYCIN 250 MG: 250 TABLET, DELAYED RELEASE ORAL at 06:23

## 2019-01-15 RX ADMIN — METOCLOPRAMIDE HYDROCHLORIDE 10 MG: 10 TABLET ORAL at 11:12

## 2019-01-15 RX ADMIN — INSULIN HUMAN 4 UNITS: 100 INJECTION, SOLUTION PARENTERAL at 11:14

## 2019-01-15 RX ADMIN — INSULIN HUMAN 10 UNITS: 100 INJECTION, SOLUTION PARENTERAL at 17:13

## 2019-01-15 RX ADMIN — ERYTHROMYCIN 250 MG: 250 TABLET, DELAYED RELEASE ORAL at 17:12

## 2019-01-15 RX ADMIN — INSULIN GLARGINE 20 UNITS: 100 INJECTION, SOLUTION SUBCUTANEOUS at 06:23

## 2019-01-15 RX ADMIN — OMEPRAZOLE 20 MG: 20 CAPSULE, DELAYED RELEASE ORAL at 06:23

## 2019-01-15 RX ADMIN — ERYTHROMYCIN 250 MG: 250 TABLET, DELAYED RELEASE ORAL at 11:12

## 2019-01-15 RX ADMIN — INSULIN GLARGINE 20 UNITS: 100 INJECTION, SOLUTION SUBCUTANEOUS at 17:12

## 2019-01-15 ASSESSMENT — ENCOUNTER SYMPTOMS
ABDOMINAL PAIN: 0
WEIGHT LOSS: 0
BLOOD IN STOOL: 0
WHEEZING: 0
DEPRESSION: 0
CONSTIPATION: 0
MYALGIAS: 0
SPUTUM PRODUCTION: 0
BRUISES/BLEEDS EASILY: 0
TREMORS: 0
BLURRED VISION: 0
SENSORY CHANGE: 0
HALLUCINATIONS: 0
PALPITATIONS: 0
HEARTBURN: 0
NERVOUS/ANXIOUS: 0

## 2019-01-15 ASSESSMENT — PAIN SCALES - GENERAL
PAINLEVEL_OUTOF10: 0

## 2019-01-15 NOTE — CARE PLAN
Problem: Safety  Goal: Will remain free from falls  Outcome: PROGRESSING AS EXPECTED  Able to demo call light & within reach, non-skid socks in place, personal belongings within reach, room floor free of clutter    Problem: Discharge Barriers/Planning  Goal: Patient's continuum of care needs will be met  Outcome: PROGRESSING AS EXPECTED  Pt denies any needs for d/c, prescriptions ordered by Dr. Rao. Pt to be discharged today

## 2019-01-15 NOTE — DISCHARGE INSTRUCTIONS
Discharge Instructions    Discharged to home by car with relative. Discharged via walking, hospital escort: Yes.  Special equipment needed: Not Applicable    Be sure to schedule a follow-up appointment with your primary care doctor or any specialists as instructed.     Discharge Plan:   Diet Plan: Discussed  Activity Level: Discussed  Confirmed Follow up Appointment: Patient to Call and Schedule Appointment  Confirmed Symptoms Management: Discussed  Medication Reconciliation Updated: Yes  Influenza Vaccine Indication: Not indicated: Previously immunized this influenza season and > 8 years of age    I understand that a diet low in cholesterol, fat, and sodium is recommended for good health. Unless I have been given specific instructions below for another diet, I accept this instruction as my diet prescription.   Other diet: diabetic     Special Instructions: None    · Is patient discharged on Warfarin / Coumadin?   No     Depression / Suicide Risk    As you are discharged from this [facility] facility, it is important to learn how to keep safe from harming yourself.    Recognize the warning signs:  · Abrupt changes in personality, positive or negative- including increase in energy   · Giving away possessions  · Change in eating patterns- significant weight changes-  positive or negative  · Change in sleeping patterns- unable to sleep or sleeping all the time   · Unwillingness or inability to communicate  · Depression  · Unusual sadness, discouragement and loneliness  · Talk of wanting to die  · Neglect of personal appearance   · Rebelliousness- reckless behavior  · Withdrawal from people/activities they love  · Confusion- inability to concentrate     If you or a loved one observes any of these behaviors or has concerns about self-harm, here's what you can do:  · Talk about it- your feelings and reasons for harming yourself  · Remove any means that you might use to hurt yourself (examples: pills, rope, extension  cords, firearm)  · Get professional help from the community (Mental Health, Substance Abuse, psychological counseling)  · Do not be alone:Call your Safe Contact- someone whom you trust who will be there for you.  · Call your local CRISIS HOTLINE 012-9365 or 793-516-4316  · Call your local Children's Mobile Crisis Response Team [place] (204) 545-1257 or www.[employer]  · Call the toll free National Suicide Prevention Hotlines   · National Suicide Prevention Lifeline 473-482-KVKA (7496)  · [employer] Hope Line Network 800-SUICIDE (406-2006)      Erythromycin tablets or capsules, delayed release  What is this medicine?  ERYTHROMYCIN (er ith navneet MYE sin) is a macrolide antibiotic. It is used to treat certain kinds of bacterial infections. It will not work for colds, flu, or other viral infections.  This medicine may be used for other purposes; ask your health care provider or pharmacist if you have questions.  COMMON BRAND NAME(S): E-Mycin, Neo-Tab, ERYC, PCE, PCE Dispertab  What should I tell my health care provider before I take this medicine?  They need to know if you have any of these conditions:  -liver disease  -myasthenia gravis  -an unusual or allergic reaction to erythromycin, other medicines, foods, dyes, or preservatives  -pregnant or trying to get pregnant  -breast-feeding  How should I use this medicine?  Take this medicine by mouth with glass of water. Follow the directions on the prescription label. Do not chew or crush. You can take this medicine on an empty stomach or with food or milk. Take your medicine at regular intervals. Do not take your medicine more often than directed. Finish the full course prescribed by your doctor even if you think your condition is better. Do not stop taking except on your doctor's advice.  Talk to your pediatrician regarding the use of this medicine in children. Special care may be needed.  Overdosage: If you think you have taken too much of this medicine contact  a poison control center or emergency room at once.  NOTE: This medicine is only for you. Do not share this medicine with others.  What if I miss a dose?  If you miss a dose, take it as soon as you can. If it is almost time for your next dose, take only that dose. Do not take double or extra doses.  What may interact with this medicine?  Do not take this medicine with any of the following medications:  -astemizole  -certain medicines for cholesterol like atorvastatin, cerivastatin, lovastatin, simvastatin  -certain medicines for fungal infections like fluconazole, itraconazole, ketoconazole, posaconazole, voriconazole  -certain medicines for irregular heart beat like amiodarone, disopyramide, dofetilide, dronedarone, flecainide, procainamide, propafenone, quinidine  -certain medicines for psychotic disturbances like mesoridazine, pimozide, thioridazine, ziprasidone  -chloroquine  -cisapride  -droperidol  -eplerenone  -ergot alkaloids like ergotamine, dihydroergotamine  -methadone  -other antibiotics like grepafloxacin or sparfloxacin  -sirolimus  -terfenadine  -vinblastine  -red yeast rice  This medicine may also interact with the following medications:  -alfentanil  -birth control  -bromocriptine  -carbamazepine  -certain medicines for anxiety or sleep  -certain medicines that treat or prevent blood clots like warfarin  -cyclosporine  -digoxin  -other medicines that prolong the QT interval (cause an abnormal heart rhythm)  -phenytoin  -theophylline  -valproate  This list may not describe all possible interactions. Give your health care provider a list of all the medicines, herbs, non-prescription drugs, or dietary supplements you use. Also tell them if you smoke, drink alcohol, or use illegal drugs. Some items may interact with your medicine.  What should I watch for while using this medicine?  Tell your doctor or health care professional if your symptoms do not improve or if they get worse.  Do not treat diarrhea  with over the counter products. Contact your doctor if you have diarrhea that lasts more than 2 days or if it is severe and watery.  What side effects may I notice from receiving this medicine?  Side effects that you should report to your doctor or health care professional as soon as possible:  -allergic reactions like skin rash, itching or hives, swelling of the face, lips, or tongue  -dark urine  -difficulty breathing  -hearing loss  -irregular heartbeat or chest pain  -redness, blistering, peeling or loosening of the skin, including inside the mouth  -severe or watery diarrhea  -unusually weak or tired  -yellowing of eyes or skin  Side effects that usually do not require medical attention (report to your doctor or health care professional if they continue or are bothersome):  -diarrhea  -loss of appetite  -nausea, vomiting  -stomach pain  This list may not describe all possible side effects. Call your doctor for medical advice about side effects. You may report side effects to FDA at 5-954-FDA-8567.  Where should I keep my medicine?  Keep out of the reach of children.  Store at room temperature between 15 and 30 degrees C (59 and 86 degrees F). Keep container tightly closed. Throw away any unused medicine after the expiration date.  NOTE: This sheet is a summary. It may not cover all possible information. If you have questions about this medicine, talk to your doctor, pharmacist, or health care provider.  © 2018 [employer]/Gold [employer] (2014-07-11 15:35:39)

## 2019-01-15 NOTE — DISCHARGE SUMMARY
Discharge Summary    CHIEF COMPLAINT ON ADMISSION  Chief Complaint   Patient presents with   • N/V   • High Blood Sugar   • Abdominal Pain   • Chest Wall Pain   • Cough       Reason for Admission  Vomiting     Admission Date  1/9/2019    CODE STATUS  Full Code    HPI & HOSPITAL COURSE  This is a 28 y.o. female here with nausea and vomiting with diabetic ketoacidosis.  She was treated in the ICU on insulin drip and with IV fluids and had slow resolve of the vomiting.  She was initated in addition to her home reglan to erythromycin for her gastroparesis.  There was concern of GI bleed on admit but this resolved and her Hgb remained stable.        Therefore, she is discharged in good and stable condition to home with close outpatient follow-up.    The patient met 2-midnight criteria for an inpatient stay at the time of discharge.    Discharge Date  1/15/2019    FOLLOW UP ITEMS POST DISCHARGE  None    DISCHARGE DIAGNOSES  Principal Problem (Resolved):    Upper GI bleeding POA: Unknown  Active Problems:    Gastroparesis POA: Yes    Type 1 diabetes mellitus (HCC) POA: Yes    Dyslipidemia POA: Yes    Essential hypertension POA: Yes  Resolved Problems:    Diabetic ketoacidosis (HCC) POA: Yes    Thrombocytopenia (HCC) POA: Yes    Intractable nausea and vomiting POA: Yes    Metabolic acidosis POA: Yes    Epigastric abdominal pain POA: Yes    Leukemoid reaction POA: Yes    Hyponatremia POA: Yes    Hypoxia POA: Unknown      FOLLOW UP  No future appointments.  Navi Huber M.D.  [address] [place] [id number]  [phone]    Schedule an appointment as soon as possible for a visit in 2 weeks        MEDICATIONS ON DISCHARGE     Medication List      START taking these medications      Instructions   erythromycin base 250 MG Tbec  Commonly known as:  UMU-TAB   Take 1 Tab by mouth 4 Times a Day,Before Meals and at Bedtime.  Dose:  250 mg        CONTINUE taking these medications      Instructions   atorvastatin 20  MG Tabs  Commonly known as:  LIPITOR   Take 1 Tab by mouth every day.  Dose:  20 mg     BASAGLAR KWIKPEN 100 UNIT/ML Sopn   Inject 32 Units as instructed 2 Times a Day.  Dose:  32 Units     Cholecalciferol 2000 UNIT Caps   Take 2,000 Units by mouth every day.  Dose:  2000 Units     ferrous sulfate 325 (65 Fe) MG tablet   Take 325 mg by mouth every day.  Dose:  325 mg     insulin glulisine 100 UNIT/ML Sopn injection  Commonly known as:  APIDRA   Inject 2-20 Units as instructed 3 times a day, with meals. FSBS base 150 For every 50 increase in blood sugar insulin doubles 200 = 2 units 250 = 4 units 300 = 8 units 350 =  16 units 400 = 32 units  Dose:  2-20 Units     metoclopramide 10 MG/10ML Soln  Commonly known as:  REGLAN   Take 10 mg by mouth 3 times a day before meals.  Dose:  10 mg     OMEGA 3 PO   Take 1 Dose by mouth every day. Unknown OTC Strength  Dose:  1 Dose     omeprazole 20 MG delayed-release capsule  Commonly known as:  PRILOSEC   Take 1 Cap by mouth every day.  Dose:  20 mg            Allergies  Allergies   Allergen Reactions   • Pcn [Penicillins] Shortness of Breath and Swelling     Per patient's Mom, patient tolerates Keflex   • Lisinopril Unspecified     Per historical: Reported pedal swelling. No facial/angioedema or rash nor respiratory symptoms.    • Promethazine Vomiting       DIET  Orders Placed This Encounter   Procedures   • Diet Order Diabetic     Standing Status:   Standing     Number of Occurrences:   1     Order Specific Question:   Diet:     Answer:   Diabetic [3]       ACTIVITY  As tolerated.  Weight bearing as tolerated    CONSULTATIONS  Pulmonary/Critial Care    PROCEDURES  Central line    LABORATORY  Lab Results   Component Value Date    SODIUM 139 01/15/2019    POTASSIUM 3.6 01/15/2019    CHLORIDE 105 01/15/2019    CO2 25 01/15/2019    GLUCOSE 119 (H) 01/15/2019    BUN 8 01/15/2019    CREATININE 0.47 (L) 01/15/2019        Lab Results   Component Value Date    WBC 6.2 01/15/2019     HEMOGLOBIN 11.7 (L) 01/15/2019    HEMATOCRIT 34.2 (L) 01/15/2019    PLATELETCT 189 01/15/2019        Total time of the discharge process exceeds 20 minutes.

## 2019-01-15 NOTE — CARE PLAN
Problem: Communication  Goal: The ability to communicate needs accurately and effectively will improve  Outcome: PROGRESSING AS EXPECTED  Patient communicates needs accurately and effectively.    Problem: Safety  Goal: Will remain free from injury  Outcome: PROGRESSING AS EXPECTED  Patient is independently ambulatory; verbalizes understanding need to call for help if needed.

## 2019-01-15 NOTE — PROGRESS NOTES
Critical Care Progress Note    Date of admission  1/9/2019    Chief Complaint  28 y.o. female with hx of DM type 1 c/b neuropathy, retinopathy, and gastroparesis, hx of recurrent hospitalization for DKA recently in 12/2018 who now came back to ED after been having nausea and vomiting. Pt noted coffee ground emesis. At ED, pt was not hypotensive, Hgb 13-14s, BG in 400s, + beta hydroxybutyrate, pH 7.16. Protonix gtt started. 3L fluid boluses given. Insulin gtt started. No hypotension or tachycardia. Pt is alert, oriented. Appears ill.     Hospital Course    1/9 admitted for dka      Interval Problem Update  Reviewed last 24 hour events:   - AAOx4   - had 2 BMs yesterday, no further N/V, abd pain   - luis armando diet   - glucose well controlled on current regimen   - normal WBC   - low K    Review of Systems  Review of Systems   Constitutional: Negative for malaise/fatigue and weight loss.   HENT: Negative for hearing loss.    Eyes: Negative for blurred vision.   Respiratory: Negative for sputum production and wheezing.    Cardiovascular: Negative for palpitations.   Gastrointestinal: Negative for abdominal pain, blood in stool, constipation and heartburn.   Genitourinary: Negative for hematuria and urgency.   Musculoskeletal: Negative for myalgias.   Neurological: Negative for tremors and sensory change.   Endo/Heme/Allergies: Does not bruise/bleed easily.   Psychiatric/Behavioral: Negative for depression, hallucinations and suicidal ideas. The patient is not nervous/anxious.    All other systems reviewed and are negative.       Vital Signs for last 24 hours   Temp:  [36.5 °C (97.7 °F)-37 °C (98.6 °F)] 36.6 °C (97.8 °F)  Pulse:  [] 86  Resp:  [10-53] 22    Respiratory   Room air with SaO2 93-98%    Physical Exam   Physical Exam   Constitutional: She is oriented to person, place, and time. She appears well-developed and well-nourished. No distress.   HENT:   Head: Normocephalic and atraumatic.   Nose: Nose normal.    Mouth/Throat: Oropharynx is clear and moist. No oropharyngeal exudate.   Eyes: Pupils are equal, round, and reactive to light. Conjunctivae and EOM are normal.   Neck: Neck supple. No thyromegaly present.   Cardiovascular: Normal rate, regular rhythm and normal heart sounds.  Exam reveals no gallop.    Pulmonary/Chest: Effort normal and breath sounds normal. No stridor. No respiratory distress. She has no wheezes. She has no rales.   Abdominal: Soft. Bowel sounds are normal. She exhibits no distension. There is no tenderness. There is no rebound.   Musculoskeletal: She exhibits no edema or deformity.   Neurological: She is alert and oriented to person, place, and time. No cranial nerve deficit. Coordination normal.   Skin: Skin is warm and dry. She is not diaphoretic. No erythema.   Psychiatric: She has a normal mood and affect. Her behavior is normal. Judgment and thought content normal.   Nursing note and vitals reviewed.      Medications  Current Facility-Administered Medications   Medication Dose Route Frequency Provider Last Rate Last Dose   • metoclopramide (REGLAN) tablet 10 mg  10 mg Oral 4X/DAY ACHS Jeremy M Gonda, M.D.   10 mg at 01/15/19 0623   • erythromycin base (UMU-TAB) tablet 250 mg  250 mg Oral 4X/DAY ACHS Jeremy M Gonda, M.D.   250 mg at 01/15/19 0623   • furosemide (LASIX) tablet 20 mg  20 mg Oral Q DAY Sheng Campuzano M.D.       • potassium chloride SA (Kdur) tablet 40 mEq  40 mEq Oral DAILY Sheng Campuzano M.D.       • insulin glargine (LANTUS) injection 20 Units  20 Units Subcutaneous BID INSULIN Sheng Campuzano M.D.   20 Units at 01/15/19 0623   • dronabinol (MARINOL) capsule 5 mg  5 mg Oral BEFORE LUNCH AND DINNER Sheng Campuzano M.D.   5 mg at 01/14/19 1752   • insulin regular (HUMULIN R) injection 3-14 Units  3-14 Units Subcutaneous 4X/DAY CHRISTOPHER Gomez M.D.   Stopped at 01/15/19 0700    And   • glucose 4 g chewable tablet 16 g  16 g Oral Q15 MIN PRN Aníbal Gomez  M.D.        And   • dextrose 50% (D50W) injection 25 mL  25 mL Intravenous Q15 MIN PRN Aníbal Gomez M.D.       • omeprazole (PRILOSEC) capsule 20 mg  20 mg Oral DAILY Aníbal Gomez M.D.   20 mg at 01/15/19 0623   • LORazepam (ATIVAN) injection 0.5-1 mg  0.5-1 mg Intravenous Q4HRS PRN Sheng Campuzano M.D.   1 mg at 01/12/19 1756   • prochlorperazine (COMPAZINE) suppository 25 mg  25 mg Rectal Q12HRS PRN Sheng Campuzano M.D.       • prochlorperazine (COMPAZINE) tablet 10 mg  10 mg Oral Q6HRS PRN Sheng Campuzano M.D.       • senna-docusate (PERICOLACE or SENOKOT S) 8.6-50 MG per tablet 2 Tab  2 Tab Oral BID Chris Street M.D.   2 Tab at 01/14/19 1752    And   • polyethylene glycol/lytes (MIRALAX) PACKET 1 Packet  1 Packet Oral QDAY PRN Chris Street M.D.   1 Packet at 01/13/19 1244    And   • magnesium hydroxide (MILK OF MAGNESIA) suspension 30 mL  30 mL Oral QDAY PRN Chris Street M.D.        And   • bisacodyl (DULCOLAX) suppository 10 mg  10 mg Rectal QDAY PRN Chris Street M.D.       • Respiratory Care per Protocol   Nebulization Continuous RT Chris Street M.D.       • ondansetron (ZOFRAN) syringe/vial injection 4 mg  4 mg Intravenous Q4HRS PRN Chris Street M.D.   4 mg at 01/10/19 2234   • ondansetron (ZOFRAN ODT) dispertab 4 mg  4 mg Oral Q4HRS PRN Chris Street M.D.   4 mg at 01/09/19 1610   • acetaminophen (TYLENOL) tablet 650 mg  650 mg Oral Q6HRS PRN Chris Street M.D.   650 mg at 01/13/19 1957       Fluids    Intake/Output Summary (Last 24 hours) at 01/15/19 0731  Last data filed at 01/15/19 0200   Gross per 24 hour   Intake              100 ml   Output             3150 ml   Net            -3050 ml       Laboratory      Recent Labs      01/15/19   0501   BNPBTYPENAT  83     Recent Labs      01/13/19   0449  01/14/19   0522  01/15/19   0501   SODIUM  140  141  139   POTASSIUM  3.3*  3.7  3.6   CHLORIDE  107  107  105   CO2  29  28  25   BUN  <3*  5*  8   CREATININE  0.41*  0.47*  0.47*   MAGNESIUM  1.7   1.8   --    PHOSPHORUS  3.0  4.1   --    CALCIUM  8.2*  8.6  9.0     Recent Labs      01/13/19   0449 01/14/19   0522  01/15/19   0501   ALTSGPT   --   36  27   ASTSGOT   --   33  18   ALKPHOSPHAT   --   67  81   TBILIRUBIN   --   0.5  0.5   GLUCOSE  107*  140*  119*     Recent Labs      01/13/19 0449 01/14/19   0522  01/15/19   0501   WBC  3.9*  4.1*  6.2   NEUTSPOLYS   --   35.60*  52.40   LYMPHOCYTES   --   49.40*  33.80   MONOCYTES   --   9.10  8.90   EOSINOPHILS   --   5.20  4.20   BASOPHILS   --   0.50  0.50   ASTSGOT   --   33  18   ALTSGPT   --   36  27   ALKPHOSPHAT   --   67  81   TBILIRUBIN   --   0.5  0.5     Recent Labs      01/13/19 0449 01/14/19   0522  01/15/19   0501   RBC  3.74*  3.89*  4.01*   HEMOGLOBIN  11.2*  11.4*  11.7*   HEMATOCRIT  32.6*  33.6*  34.2*   PLATELETCT  162*  153*  189       Imaging  X-Ray:  No film today    Assessment/Plan  Type 1 diabetes mellitus (HCC)- (present on admission)   Assessment & Plan    With recurrent DKA secondary to poor compliance, brittle diabetes  Continue long and short acting insulin at current dosage  Diabetic education provided, social work evaluated for resources       Gastroparesis- (present on admission)   Assessment & Plan    Continues to improve  Continue erythromycin and Reglan  Bowel protocol     Essential hypertension- (present on admission)   Assessment & Plan    Controlled  Monitor off antihypertensives for now     Dyslipidemia- (present on admission)   Assessment & Plan    Continue statin     Full code    VTE:  Contraindicated -Virgen-Babin tear  Ulcer: PPI  Lines: None    I have performed a physical exam and reviewed and updated ROS and Plan today (1/15/2019). In review of yesterday's note (1/14/2019), there are no changes except as documented above.     Discussed patient condition and risk of morbidity and/or mortality with Hospitalist, RN, RT, Pharmacy, , Code status disscussed, Charge nurse / hot rounds and Patient

## 2019-01-15 NOTE — PROGRESS NOTES
Interdisciplinary rounds done with team. Updates given on pt status. Questions answered. New orders received. Pt to discharger today.

## 2019-01-15 NOTE — PROGRESS NOTES
Discharge instructions reviewed and given to patient. Pt does not have further questions at this time. Patient understands discharge & when to seek immediate medical attention. Pt waiting for her mom for a ride home, probably around 1730 today.

## 2019-01-16 NOTE — PROGRESS NOTES
Scheduled PM medications given-see MAR. FSBS done=315. Pt's mother here to pick her up. Left midline IV removed & pressure held - no bleeding noted. Pt instructed to eat when she get home, as she was given her night time insulin-pt verbalizes understanding & agree. Pt walked out with mother, personal belongings & d/c instructions.

## 2019-01-20 PROCEDURE — 99291 CRITICAL CARE FIRST HOUR: CPT

## 2019-01-21 ENCOUNTER — APPOINTMENT (OUTPATIENT)
Dept: RADIOLOGY | Facility: MEDICAL CENTER | Age: 29
DRG: 639 | End: 2019-01-21
Attending: INTERNAL MEDICINE
Payer: MEDICAID

## 2019-01-21 ENCOUNTER — HOSPITAL ENCOUNTER (INPATIENT)
Facility: MEDICAL CENTER | Age: 29
LOS: 2 days | DRG: 639 | End: 2019-01-23
Attending: EMERGENCY MEDICINE | Admitting: HOSPITALIST
Payer: MEDICAID

## 2019-01-21 DIAGNOSIS — R11.2 INTRACTABLE VOMITING WITH NAUSEA, UNSPECIFIED VOMITING TYPE: ICD-10-CM

## 2019-01-21 DIAGNOSIS — E10.10 DIABETIC KETOACIDOSIS WITHOUT COMA ASSOCIATED WITH TYPE 1 DIABETES MELLITUS (HCC): ICD-10-CM

## 2019-01-21 DIAGNOSIS — R10.84 GENERALIZED ABDOMINAL PAIN: ICD-10-CM

## 2019-01-21 PROBLEM — K92.0 COFFEE GROUND EMESIS: Status: ACTIVE | Noted: 2019-01-21

## 2019-01-21 PROBLEM — E11.10 DKA (DIABETIC KETOACIDOSES): Status: ACTIVE | Noted: 2019-01-21

## 2019-01-21 LAB
ALBUMIN SERPL BCP-MCNC: 5.3 G/DL (ref 3.2–4.9)
ALBUMIN/GLOB SERPL: 1.7 G/DL
ALP SERPL-CCNC: 118 U/L (ref 30–99)
ALT SERPL-CCNC: 19 U/L (ref 2–50)
ANION GAP SERPL CALC-SCNC: 10 MMOL/L (ref 0–11.9)
ANION GAP SERPL CALC-SCNC: 13 MMOL/L (ref 0–11.9)
ANION GAP SERPL CALC-SCNC: 21 MMOL/L (ref 0–11.9)
ANION GAP SERPL CALC-SCNC: 25 MMOL/L (ref 0–11.9)
ANION GAP SERPL CALC-SCNC: 7 MMOL/L (ref 0–11.9)
APPEARANCE UR: CLEAR
AST SERPL-CCNC: 12 U/L (ref 12–45)
BACTERIA #/AREA URNS HPF: NEGATIVE /HPF
BASOPHILS # BLD AUTO: 0.2 % (ref 0–1.8)
BASOPHILS # BLD AUTO: 0.6 % (ref 0–1.8)
BASOPHILS # BLD: 0.02 K/UL (ref 0–0.12)
BASOPHILS # BLD: 0.06 K/UL (ref 0–0.12)
BILIRUB SERPL-MCNC: 0.6 MG/DL (ref 0.1–1.5)
BILIRUB UR QL STRIP.AUTO: NEGATIVE
BUN SERPL-MCNC: 12 MG/DL (ref 8–22)
BUN SERPL-MCNC: 13 MG/DL (ref 8–22)
BUN SERPL-MCNC: 13 MG/DL (ref 8–22)
BUN SERPL-MCNC: 16 MG/DL (ref 8–22)
BUN SERPL-MCNC: 18 MG/DL (ref 8–22)
CA-I SERPL-SCNC: 1.3 MMOL/L (ref 1.1–1.3)
CALCIUM SERPL-MCNC: 10.4 MG/DL (ref 8.5–10.5)
CALCIUM SERPL-MCNC: 8.6 MG/DL (ref 8.5–10.5)
CALCIUM SERPL-MCNC: 8.7 MG/DL (ref 8.5–10.5)
CALCIUM SERPL-MCNC: 8.8 MG/DL (ref 8.5–10.5)
CALCIUM SERPL-MCNC: 8.9 MG/DL (ref 8.5–10.5)
CHLORIDE SERPL-SCNC: 108 MMOL/L (ref 96–112)
CHLORIDE SERPL-SCNC: 112 MMOL/L (ref 96–112)
CHLORIDE SERPL-SCNC: 112 MMOL/L (ref 96–112)
CHLORIDE SERPL-SCNC: 113 MMOL/L (ref 96–112)
CHLORIDE SERPL-SCNC: 96 MMOL/L (ref 96–112)
CO2 SERPL-SCNC: 12 MMOL/L (ref 20–33)
CO2 SERPL-SCNC: 15 MMOL/L (ref 20–33)
CO2 SERPL-SCNC: 18 MMOL/L (ref 20–33)
CO2 SERPL-SCNC: 6 MMOL/L (ref 20–33)
CO2 SERPL-SCNC: 8 MMOL/L (ref 20–33)
COLOR UR: YELLOW
CREAT SERPL-MCNC: 0.52 MG/DL (ref 0.5–1.4)
CREAT SERPL-MCNC: 0.58 MG/DL (ref 0.5–1.4)
CREAT SERPL-MCNC: 0.59 MG/DL (ref 0.5–1.4)
CREAT SERPL-MCNC: 0.71 MG/DL (ref 0.5–1.4)
CREAT SERPL-MCNC: 1 MG/DL (ref 0.5–1.4)
EOSINOPHIL # BLD AUTO: 0 K/UL (ref 0–0.51)
EOSINOPHIL # BLD AUTO: 0.06 K/UL (ref 0–0.51)
EOSINOPHIL NFR BLD: 0 % (ref 0–6.9)
EOSINOPHIL NFR BLD: 0.6 % (ref 0–6.9)
EPI CELLS #/AREA URNS HPF: NEGATIVE /HPF
ERYTHROCYTE [DISTWIDTH] IN BLOOD BY AUTOMATED COUNT: 40.4 FL (ref 35.9–50)
ERYTHROCYTE [DISTWIDTH] IN BLOOD BY AUTOMATED COUNT: 40.6 FL (ref 35.9–50)
FLUAV RNA SPEC QL NAA+PROBE: NEGATIVE
FLUBV RNA SPEC QL NAA+PROBE: NEGATIVE
GLOBULIN SER CALC-MCNC: 3.2 G/DL (ref 1.9–3.5)
GLUCOSE BLD-MCNC: 109 MG/DL (ref 65–99)
GLUCOSE BLD-MCNC: 113 MG/DL (ref 65–99)
GLUCOSE BLD-MCNC: 126 MG/DL (ref 65–99)
GLUCOSE BLD-MCNC: 126 MG/DL (ref 65–99)
GLUCOSE BLD-MCNC: 128 MG/DL (ref 65–99)
GLUCOSE BLD-MCNC: 144 MG/DL (ref 65–99)
GLUCOSE BLD-MCNC: 160 MG/DL (ref 65–99)
GLUCOSE BLD-MCNC: 179 MG/DL (ref 65–99)
GLUCOSE BLD-MCNC: 190 MG/DL (ref 65–99)
GLUCOSE BLD-MCNC: 220 MG/DL (ref 65–99)
GLUCOSE BLD-MCNC: 244 MG/DL (ref 65–99)
GLUCOSE BLD-MCNC: 258 MG/DL (ref 65–99)
GLUCOSE BLD-MCNC: 283 MG/DL (ref 65–99)
GLUCOSE BLD-MCNC: 440 MG/DL (ref 65–99)
GLUCOSE BLD-MCNC: 495 MG/DL (ref 65–99)
GLUCOSE BLD-MCNC: 516 MG/DL (ref 65–99)
GLUCOSE SERPL-MCNC: 116 MG/DL (ref 65–99)
GLUCOSE SERPL-MCNC: 124 MG/DL (ref 65–99)
GLUCOSE SERPL-MCNC: 211 MG/DL (ref 65–99)
GLUCOSE SERPL-MCNC: 332 MG/DL (ref 65–99)
GLUCOSE SERPL-MCNC: 564 MG/DL (ref 65–99)
GLUCOSE UR STRIP.AUTO-MCNC: >=1000 MG/DL
HCG UR QL: NEGATIVE
HCT VFR BLD AUTO: 34.5 % (ref 37–47)
HCT VFR BLD AUTO: 46.8 % (ref 37–47)
HGB BLD-MCNC: 11.7 G/DL (ref 12–16)
HGB BLD-MCNC: 11.8 G/DL (ref 12–16)
HGB BLD-MCNC: 15.5 G/DL (ref 12–16)
HYALINE CASTS #/AREA URNS LPF: ABNORMAL /LPF
IMM GRANULOCYTES # BLD AUTO: 0.1 K/UL (ref 0–0.11)
IMM GRANULOCYTES # BLD AUTO: 0.13 K/UL (ref 0–0.11)
IMM GRANULOCYTES NFR BLD AUTO: 1 % (ref 0–0.9)
IMM GRANULOCYTES NFR BLD AUTO: 1.1 % (ref 0–0.9)
KETONES UR STRIP.AUTO-MCNC: >=160 MG/DL
LACTATE BLD-SCNC: 1.4 MMOL/L (ref 0.5–2)
LEUKOCYTE ESTERASE UR QL STRIP.AUTO: NEGATIVE
LIPASE SERPL-CCNC: 10 U/L (ref 11–82)
LYMPHOCYTES # BLD AUTO: 1.07 K/UL (ref 1–4.8)
LYMPHOCYTES # BLD AUTO: 2.82 K/UL (ref 1–4.8)
LYMPHOCYTES NFR BLD: 27.5 % (ref 22–41)
LYMPHOCYTES NFR BLD: 9.1 % (ref 22–41)
MAGNESIUM SERPL-MCNC: 1.7 MG/DL (ref 1.5–2.5)
MAGNESIUM SERPL-MCNC: 1.8 MG/DL (ref 1.5–2.5)
MAGNESIUM SERPL-MCNC: 2.2 MG/DL (ref 1.5–2.5)
MAGNESIUM SERPL-MCNC: 2.3 MG/DL (ref 1.5–2.5)
MCH RBC QN AUTO: 28.7 PG (ref 27–33)
MCH RBC QN AUTO: 29.1 PG (ref 27–33)
MCHC RBC AUTO-ENTMCNC: 33.1 G/DL (ref 33.6–35)
MCHC RBC AUTO-ENTMCNC: 33.7 G/DL (ref 33.6–35)
MCV RBC AUTO: 86.4 FL (ref 81.4–97.8)
MCV RBC AUTO: 86.5 FL (ref 81.4–97.8)
MICRO URNS: ABNORMAL
MONOCYTES # BLD AUTO: 0.61 K/UL (ref 0–0.85)
MONOCYTES # BLD AUTO: 0.68 K/UL (ref 0–0.85)
MONOCYTES NFR BLD AUTO: 5.2 % (ref 0–13.4)
MONOCYTES NFR BLD AUTO: 6.6 % (ref 0–13.4)
NEUTROPHILS # BLD AUTO: 6.55 K/UL (ref 2–7.15)
NEUTROPHILS # BLD AUTO: 9.94 K/UL (ref 2–7.15)
NEUTROPHILS NFR BLD: 63.7 % (ref 44–72)
NEUTROPHILS NFR BLD: 84.4 % (ref 44–72)
NITRITE UR QL STRIP.AUTO: NEGATIVE
NRBC # BLD AUTO: 0 K/UL
NRBC # BLD AUTO: 0 K/UL
NRBC BLD-RTO: 0 /100 WBC
NRBC BLD-RTO: 0 /100 WBC
PH UR STRIP.AUTO: 5 [PH]
PHOSPHATE SERPL-MCNC: 1.8 MG/DL (ref 2.5–4.5)
PHOSPHATE SERPL-MCNC: 2.2 MG/DL (ref 2.5–4.5)
PHOSPHATE SERPL-MCNC: 2.7 MG/DL (ref 2.5–4.5)
PLATELET # BLD AUTO: 220 K/UL (ref 164–446)
PLATELET # BLD AUTO: 318 K/UL (ref 164–446)
PMV BLD AUTO: 10.2 FL (ref 9–12.9)
PMV BLD AUTO: 10.5 FL (ref 9–12.9)
POTASSIUM SERPL-SCNC: 3.7 MMOL/L (ref 3.6–5.5)
POTASSIUM SERPL-SCNC: 4 MMOL/L (ref 3.6–5.5)
POTASSIUM SERPL-SCNC: 4.1 MMOL/L (ref 3.6–5.5)
POTASSIUM SERPL-SCNC: 4.5 MMOL/L (ref 3.6–5.5)
POTASSIUM SERPL-SCNC: 4.8 MMOL/L (ref 3.6–5.5)
PROT SERPL-MCNC: 8.5 G/DL (ref 6–8.2)
PROT UR QL STRIP: 30 MG/DL
RBC # BLD AUTO: 4.05 M/UL (ref 4.2–5.4)
RBC # BLD AUTO: 5.41 M/UL (ref 4.2–5.4)
RBC # URNS HPF: ABNORMAL /HPF
RBC UR QL AUTO: NEGATIVE
SODIUM SERPL-SCNC: 129 MMOL/L (ref 135–145)
SODIUM SERPL-SCNC: 135 MMOL/L (ref 135–145)
SODIUM SERPL-SCNC: 137 MMOL/L (ref 135–145)
SODIUM SERPL-SCNC: 137 MMOL/L (ref 135–145)
SODIUM SERPL-SCNC: 138 MMOL/L (ref 135–145)
SP GR UR REFRACTOMETRY: >=1.03
SP GR UR STRIP.AUTO: 1.03
UROBILINOGEN UR STRIP.AUTO-MCNC: 0.2 MG/DL
WBC # BLD AUTO: 10.3 K/UL (ref 4.8–10.8)
WBC # BLD AUTO: 11.8 K/UL (ref 4.8–10.8)
WBC #/AREA URNS HPF: ABNORMAL /HPF

## 2019-01-21 PROCEDURE — 83605 ASSAY OF LACTIC ACID: CPT

## 2019-01-21 PROCEDURE — 83690 ASSAY OF LIPASE: CPT

## 2019-01-21 PROCEDURE — 96375 TX/PRO/DX INJ NEW DRUG ADDON: CPT

## 2019-01-21 PROCEDURE — 85018 HEMOGLOBIN: CPT

## 2019-01-21 PROCEDURE — 700105 HCHG RX REV CODE 258: Performed by: HOSPITALIST

## 2019-01-21 PROCEDURE — C1751 CATH, INF, PER/CENT/MIDLINE: HCPCS

## 2019-01-21 PROCEDURE — 700111 HCHG RX REV CODE 636 W/ 250 OVERRIDE (IP): Performed by: HOSPITALIST

## 2019-01-21 PROCEDURE — 87502 INFLUENZA DNA AMP PROBE: CPT

## 2019-01-21 PROCEDURE — 36556 INSERT NON-TUNNEL CV CATH: CPT

## 2019-01-21 PROCEDURE — 81001 URINALYSIS AUTO W/SCOPE: CPT

## 2019-01-21 PROCEDURE — 700102 HCHG RX REV CODE 250 W/ 637 OVERRIDE(OP): Performed by: HOSPITALIST

## 2019-01-21 PROCEDURE — 700105 HCHG RX REV CODE 258: Performed by: EMERGENCY MEDICINE

## 2019-01-21 PROCEDURE — 82962 GLUCOSE BLOOD TEST: CPT | Mod: 91

## 2019-01-21 PROCEDURE — 85025 COMPLETE CBC W/AUTO DIFF WBC: CPT

## 2019-01-21 PROCEDURE — 83735 ASSAY OF MAGNESIUM: CPT

## 2019-01-21 PROCEDURE — 99292 CRITICAL CARE ADDL 30 MIN: CPT | Performed by: HOSPITALIST

## 2019-01-21 PROCEDURE — 96361 HYDRATE IV INFUSION ADD-ON: CPT

## 2019-01-21 PROCEDURE — 81025 URINE PREGNANCY TEST: CPT

## 2019-01-21 PROCEDURE — 84100 ASSAY OF PHOSPHORUS: CPT

## 2019-01-21 PROCEDURE — 700111 HCHG RX REV CODE 636 W/ 250 OVERRIDE (IP): Performed by: EMERGENCY MEDICINE

## 2019-01-21 PROCEDURE — 99291 CRITICAL CARE FIRST HOUR: CPT | Mod: 25 | Performed by: INTERNAL MEDICINE

## 2019-01-21 PROCEDURE — C9113 INJ PANTOPRAZOLE SODIUM, VIA: HCPCS | Performed by: HOSPITALIST

## 2019-01-21 PROCEDURE — 82330 ASSAY OF CALCIUM: CPT

## 2019-01-21 PROCEDURE — 99291 CRITICAL CARE FIRST HOUR: CPT | Performed by: HOSPITALIST

## 2019-01-21 PROCEDURE — 02HV33Z INSERTION OF INFUSION DEVICE INTO SUPERIOR VENA CAVA, PERCUTANEOUS APPROACH: ICD-10-PCS | Performed by: INTERNAL MEDICINE

## 2019-01-21 PROCEDURE — A9270 NON-COVERED ITEM OR SERVICE: HCPCS | Performed by: HOSPITALIST

## 2019-01-21 PROCEDURE — 80048 BASIC METABOLIC PNL TOTAL CA: CPT

## 2019-01-21 PROCEDURE — B548ZZA ULTRASONOGRAPHY OF SUPERIOR VENA CAVA, GUIDANCE: ICD-10-PCS | Performed by: INTERNAL MEDICINE

## 2019-01-21 PROCEDURE — 71045 X-RAY EXAM CHEST 1 VIEW: CPT

## 2019-01-21 PROCEDURE — 80053 COMPREHEN METABOLIC PANEL: CPT

## 2019-01-21 PROCEDURE — 700111 HCHG RX REV CODE 636 W/ 250 OVERRIDE (IP): Performed by: INTERNAL MEDICINE

## 2019-01-21 PROCEDURE — 96374 THER/PROPH/DIAG INJ IV PUSH: CPT

## 2019-01-21 PROCEDURE — 36556 INSERT NON-TUNNEL CV CATH: CPT | Mod: LT | Performed by: INTERNAL MEDICINE

## 2019-01-21 PROCEDURE — 770022 HCHG ROOM/CARE - ICU (200)

## 2019-01-21 RX ORDER — POTASSIUM CHLORIDE 7.45 MG/ML
10 INJECTION INTRAVENOUS ONCE
Status: COMPLETED | OUTPATIENT
Start: 2019-01-21 | End: 2019-01-21

## 2019-01-21 RX ORDER — AMOXICILLIN 250 MG
2 CAPSULE ORAL 2 TIMES DAILY
Status: DISCONTINUED | OUTPATIENT
Start: 2019-01-21 | End: 2019-01-23 | Stop reason: HOSPADM

## 2019-01-21 RX ORDER — MORPHINE SULFATE 4 MG/ML
2 INJECTION, SOLUTION INTRAMUSCULAR; INTRAVENOUS ONCE
Status: COMPLETED | OUTPATIENT
Start: 2019-01-21 | End: 2019-01-21

## 2019-01-21 RX ORDER — SODIUM CHLORIDE 9 MG/ML
2000 INJECTION, SOLUTION INTRAVENOUS ONCE
Status: DISCONTINUED | OUTPATIENT
Start: 2019-01-21 | End: 2019-01-21

## 2019-01-21 RX ORDER — OXYCODONE HYDROCHLORIDE 5 MG/1
5 TABLET ORAL EVERY 4 HOURS PRN
Status: DISCONTINUED | OUTPATIENT
Start: 2019-01-21 | End: 2019-01-23 | Stop reason: HOSPADM

## 2019-01-21 RX ORDER — SODIUM CHLORIDE, SODIUM LACTATE, POTASSIUM CHLORIDE, CALCIUM CHLORIDE 600; 310; 30; 20 MG/100ML; MG/100ML; MG/100ML; MG/100ML
1000 INJECTION, SOLUTION INTRAVENOUS ONCE
Status: COMPLETED | OUTPATIENT
Start: 2019-01-21 | End: 2019-01-21

## 2019-01-21 RX ORDER — MAGNESIUM SULFATE HEPTAHYDRATE 40 MG/ML
2 INJECTION, SOLUTION INTRAVENOUS
Status: COMPLETED | OUTPATIENT
Start: 2019-01-21 | End: 2019-01-21

## 2019-01-21 RX ORDER — DEXTROSE MONOHYDRATE 25 G/50ML
25 INJECTION, SOLUTION INTRAVENOUS
Status: DISCONTINUED | OUTPATIENT
Start: 2019-01-21 | End: 2019-01-23 | Stop reason: HOSPADM

## 2019-01-21 RX ORDER — ONDANSETRON 4 MG/1
4 TABLET, ORALLY DISINTEGRATING ORAL EVERY 4 HOURS PRN
Status: DISCONTINUED | OUTPATIENT
Start: 2019-01-21 | End: 2019-01-23 | Stop reason: HOSPADM

## 2019-01-21 RX ORDER — ONDANSETRON 2 MG/ML
4 INJECTION INTRAMUSCULAR; INTRAVENOUS ONCE
Status: COMPLETED | OUTPATIENT
Start: 2019-01-21 | End: 2019-01-21

## 2019-01-21 RX ORDER — SODIUM CHLORIDE, SODIUM LACTATE, POTASSIUM CHLORIDE, CALCIUM CHLORIDE 600; 310; 30; 20 MG/100ML; MG/100ML; MG/100ML; MG/100ML
INJECTION, SOLUTION INTRAVENOUS CONTINUOUS
Status: DISCONTINUED | OUTPATIENT
Start: 2019-01-21 | End: 2019-01-22

## 2019-01-21 RX ORDER — DIPHENHYDRAMINE HYDROCHLORIDE 50 MG/ML
50 INJECTION INTRAMUSCULAR; INTRAVENOUS EVERY 6 HOURS PRN
Status: DISCONTINUED | OUTPATIENT
Start: 2019-01-21 | End: 2019-01-23 | Stop reason: HOSPADM

## 2019-01-21 RX ORDER — DEXTROSE, SODIUM CHLORIDE, SODIUM LACTATE, POTASSIUM CHLORIDE, AND CALCIUM CHLORIDE 5; .6; .31; .03; .02 G/100ML; G/100ML; G/100ML; G/100ML; G/100ML
INJECTION, SOLUTION INTRAVENOUS CONTINUOUS
Status: DISCONTINUED | OUTPATIENT
Start: 2019-01-21 | End: 2019-01-21

## 2019-01-21 RX ORDER — OXYCODONE HYDROCHLORIDE 5 MG/1
5 TABLET ORAL
Status: DISCONTINUED | OUTPATIENT
Start: 2019-01-21 | End: 2019-01-21

## 2019-01-21 RX ORDER — FERROUS SULFATE 325(65) MG
325 TABLET ORAL DAILY
Status: DISCONTINUED | OUTPATIENT
Start: 2019-01-21 | End: 2019-01-21

## 2019-01-21 RX ORDER — ATORVASTATIN CALCIUM 20 MG/1
20 TABLET, FILM COATED ORAL DAILY
Status: DISCONTINUED | OUTPATIENT
Start: 2019-01-21 | End: 2019-01-23 | Stop reason: HOSPADM

## 2019-01-21 RX ORDER — OMEPRAZOLE 20 MG/1
20 CAPSULE, DELAYED RELEASE ORAL DAILY
Status: DISCONTINUED | OUTPATIENT
Start: 2019-01-21 | End: 2019-01-21

## 2019-01-21 RX ORDER — PANTOPRAZOLE SODIUM 40 MG/10ML
40 INJECTION, POWDER, LYOPHILIZED, FOR SOLUTION INTRAVENOUS 2 TIMES DAILY
Status: DISCONTINUED | OUTPATIENT
Start: 2019-01-21 | End: 2019-01-22

## 2019-01-21 RX ORDER — METOCLOPRAMIDE HYDROCHLORIDE 5 MG/5ML
10 SOLUTION ORAL
Status: DISCONTINUED | OUTPATIENT
Start: 2019-01-21 | End: 2019-01-21

## 2019-01-21 RX ORDER — METOCLOPRAMIDE HYDROCHLORIDE 5 MG/ML
10 INJECTION INTRAMUSCULAR; INTRAVENOUS ONCE
Status: COMPLETED | OUTPATIENT
Start: 2019-01-21 | End: 2019-01-21

## 2019-01-21 RX ORDER — ONDANSETRON 2 MG/ML
4 INJECTION INTRAMUSCULAR; INTRAVENOUS EVERY 4 HOURS PRN
Status: DISCONTINUED | OUTPATIENT
Start: 2019-01-21 | End: 2019-01-23 | Stop reason: HOSPADM

## 2019-01-21 RX ORDER — PROMETHAZINE HYDROCHLORIDE 25 MG/1
12.5-25 TABLET ORAL EVERY 4 HOURS PRN
Status: DISCONTINUED | OUTPATIENT
Start: 2019-01-21 | End: 2019-01-23 | Stop reason: HOSPADM

## 2019-01-21 RX ORDER — ERYTHROMYCIN 250 MG/1
250 TABLET, DELAYED RELEASE ORAL
Status: DISCONTINUED | OUTPATIENT
Start: 2019-01-21 | End: 2019-01-23 | Stop reason: HOSPADM

## 2019-01-21 RX ORDER — POLYETHYLENE GLYCOL 3350 17 G/17G
1 POWDER, FOR SOLUTION ORAL
Status: DISCONTINUED | OUTPATIENT
Start: 2019-01-21 | End: 2019-01-23 | Stop reason: HOSPADM

## 2019-01-21 RX ORDER — PROMETHAZINE HYDROCHLORIDE 25 MG/1
12.5-25 SUPPOSITORY RECTAL EVERY 4 HOURS PRN
Status: DISCONTINUED | OUTPATIENT
Start: 2019-01-21 | End: 2019-01-23 | Stop reason: HOSPADM

## 2019-01-21 RX ORDER — HYDROMORPHONE HYDROCHLORIDE 1 MG/ML
0.5 INJECTION, SOLUTION INTRAMUSCULAR; INTRAVENOUS; SUBCUTANEOUS
Status: DISCONTINUED | OUTPATIENT
Start: 2019-01-21 | End: 2019-01-21

## 2019-01-21 RX ORDER — OXYCODONE HYDROCHLORIDE 10 MG/1
10 TABLET ORAL
Status: DISCONTINUED | OUTPATIENT
Start: 2019-01-21 | End: 2019-01-21

## 2019-01-21 RX ORDER — MAGNESIUM SULFATE HEPTAHYDRATE 40 MG/ML
4 INJECTION, SOLUTION INTRAVENOUS
Status: COMPLETED | OUTPATIENT
Start: 2019-01-21 | End: 2019-01-21

## 2019-01-21 RX ORDER — BISACODYL 10 MG
10 SUPPOSITORY, RECTAL RECTAL
Status: DISCONTINUED | OUTPATIENT
Start: 2019-01-21 | End: 2019-01-23 | Stop reason: HOSPADM

## 2019-01-21 RX ORDER — DEXTROSE MONOHYDRATE 25 G/50ML
25 INJECTION, SOLUTION INTRAVENOUS
Status: DISCONTINUED | OUTPATIENT
Start: 2019-01-21 | End: 2019-01-21

## 2019-01-21 RX ORDER — SODIUM CHLORIDE, SODIUM LACTATE, POTASSIUM CHLORIDE, AND CALCIUM CHLORIDE .6; .31; .03; .02 G/100ML; G/100ML; G/100ML; G/100ML
2000 INJECTION, SOLUTION INTRAVENOUS ONCE
Status: COMPLETED | OUTPATIENT
Start: 2019-01-21 | End: 2019-01-21

## 2019-01-21 RX ORDER — DEXTROSE AND SODIUM CHLORIDE 10; .45 G/100ML; G/100ML
INJECTION, SOLUTION INTRAVENOUS CONTINUOUS
Status: DISCONTINUED | OUTPATIENT
Start: 2019-01-21 | End: 2019-01-21

## 2019-01-21 RX ORDER — HEPARIN SODIUM 5000 [USP'U]/ML
5000 INJECTION, SOLUTION INTRAVENOUS; SUBCUTANEOUS EVERY 8 HOURS
Status: DISCONTINUED | OUTPATIENT
Start: 2019-01-21 | End: 2019-01-21

## 2019-01-21 RX ADMIN — SODIUM CHLORIDE, POTASSIUM CHLORIDE, SODIUM LACTATE AND CALCIUM CHLORIDE 1000 ML: 600; 310; 30; 20 INJECTION, SOLUTION INTRAVENOUS at 02:00

## 2019-01-21 RX ADMIN — SODIUM CHLORIDE, POTASSIUM CHLORIDE, SODIUM LACTATE AND CALCIUM CHLORIDE: 600; 310; 30; 20 INJECTION, SOLUTION INTRAVENOUS at 20:55

## 2019-01-21 RX ADMIN — MORPHINE SULFATE 2 MG: 4 INJECTION INTRAVENOUS at 01:08

## 2019-01-21 RX ADMIN — SODIUM CHLORIDE, POTASSIUM CHLORIDE, SODIUM LACTATE AND CALCIUM CHLORIDE 2000 ML: 600; 310; 30; 20 INJECTION, SOLUTION INTRAVENOUS at 02:00

## 2019-01-21 RX ADMIN — SODIUM CHLORIDE, SODIUM LACTATE, POTASSIUM CHLORIDE, CALCIUM CHLORIDE AND DEXTROSE MONOHYDRATE: 5; 600; 310; 30; 20 INJECTION, SOLUTION INTRAVENOUS at 05:58

## 2019-01-21 RX ADMIN — DEXTROSE AND SODIUM CHLORIDE: 10; .45 INJECTION, SOLUTION INTRAVENOUS at 12:12

## 2019-01-21 RX ADMIN — PANTOPRAZOLE SODIUM 40 MG: 40 INJECTION, POWDER, LYOPHILIZED, FOR SOLUTION INTRAVENOUS at 06:13

## 2019-01-21 RX ADMIN — POTASSIUM CHLORIDE 10 MEQ: 10 INJECTION, SOLUTION INTRAVENOUS at 14:31

## 2019-01-21 RX ADMIN — ERYTHROMYCIN 250 MG: 250 TABLET, DELAYED RELEASE ORAL at 06:15

## 2019-01-21 RX ADMIN — POTASSIUM CHLORIDE 10 MEQ: 10 INJECTION, SOLUTION INTRAVENOUS at 18:25

## 2019-01-21 RX ADMIN — HYDROMORPHONE HYDROCHLORIDE 0.5 MG: 1 INJECTION, SOLUTION INTRAMUSCULAR; INTRAVENOUS; SUBCUTANEOUS at 04:02

## 2019-01-21 RX ADMIN — INSULIN HUMAN 20 UNITS: 100 INJECTION, SUSPENSION SUBCUTANEOUS at 20:45

## 2019-01-21 RX ADMIN — DIPHENHYDRAMINE HYDROCHLORIDE 50 MG: 50 INJECTION INTRAMUSCULAR; INTRAVENOUS at 11:21

## 2019-01-21 RX ADMIN — POTASSIUM CHLORIDE 10 MEQ: 10 INJECTION, SOLUTION INTRAVENOUS at 06:32

## 2019-01-21 RX ADMIN — METOCLOPRAMIDE 10 MG: 5 INJECTION, SOLUTION INTRAMUSCULAR; INTRAVENOUS at 00:57

## 2019-01-21 RX ADMIN — ONDANSETRON 4 MG: 2 INJECTION INTRAMUSCULAR; INTRAVENOUS at 01:32

## 2019-01-21 RX ADMIN — FENTANYL CITRATE 50 MCG: 50 INJECTION, SOLUTION INTRAMUSCULAR; INTRAVENOUS at 09:42

## 2019-01-21 RX ADMIN — INSULIN HUMAN 7.5 UNITS: 100 INJECTION, SOLUTION PARENTERAL at 02:42

## 2019-01-21 RX ADMIN — POTASSIUM CHLORIDE 10 MEQ: 10 INJECTION, SOLUTION INTRAVENOUS at 18:26

## 2019-01-21 RX ADMIN — ERYTHROMYCIN 250 MG: 250 TABLET, DELAYED RELEASE ORAL at 11:14

## 2019-01-21 RX ADMIN — METOCLOPRAMIDE HYDROCHLORIDE 10 MG: 5 SOLUTION ORAL at 06:14

## 2019-01-21 RX ADMIN — SODIUM CHLORIDE, POTASSIUM CHLORIDE, SODIUM LACTATE AND CALCIUM CHLORIDE 1000 ML: 600; 310; 30; 20 INJECTION, SOLUTION INTRAVENOUS at 01:00

## 2019-01-21 RX ADMIN — PANTOPRAZOLE SODIUM 40 MG: 40 INJECTION, POWDER, LYOPHILIZED, FOR SOLUTION INTRAVENOUS at 18:26

## 2019-01-21 RX ADMIN — PROCHLORPERAZINE EDISYLATE 10 MG: 5 INJECTION INTRAMUSCULAR; INTRAVENOUS at 09:42

## 2019-01-21 RX ADMIN — ONDANSETRON HYDROCHLORIDE 4 MG: 2 INJECTION, SOLUTION INTRAMUSCULAR; INTRAVENOUS at 16:18

## 2019-01-21 RX ADMIN — ONDANSETRON HYDROCHLORIDE 4 MG: 2 INJECTION, SOLUTION INTRAMUSCULAR; INTRAVENOUS at 21:00

## 2019-01-21 RX ADMIN — POTASSIUM CHLORIDE 10 MEQ: 10 INJECTION, SOLUTION INTRAVENOUS at 15:31

## 2019-01-21 RX ADMIN — SODIUM CHLORIDE 4 UNITS/HR: 9 INJECTION, SOLUTION INTRAVENOUS at 03:15

## 2019-01-21 RX ADMIN — SODIUM CHLORIDE, POTASSIUM CHLORIDE, SODIUM LACTATE AND CALCIUM CHLORIDE: 600; 310; 30; 20 INJECTION, SOLUTION INTRAVENOUS at 04:43

## 2019-01-21 RX ADMIN — FENTANYL CITRATE 50 MCG: 50 INJECTION, SOLUTION INTRAMUSCULAR; INTRAVENOUS at 21:00

## 2019-01-21 RX ADMIN — ERYTHROMYCIN 250 MG: 250 TABLET, DELAYED RELEASE ORAL at 21:06

## 2019-01-21 RX ADMIN — POTASSIUM CHLORIDE 10 MEQ: 10 INJECTION, SOLUTION INTRAVENOUS at 12:07

## 2019-01-21 RX ADMIN — INSULIN HUMAN 2 UNITS: 100 INJECTION, SOLUTION PARENTERAL at 20:46

## 2019-01-21 RX ADMIN — PROCHLORPERAZINE EDISYLATE 10 MG: 5 INJECTION INTRAMUSCULAR; INTRAVENOUS at 14:21

## 2019-01-21 RX ADMIN — FENTANYL CITRATE 50 MCG: 50 INJECTION, SOLUTION INTRAMUSCULAR; INTRAVENOUS at 16:11

## 2019-01-21 RX ADMIN — OXYCODONE HYDROCHLORIDE 5 MG: 5 TABLET ORAL at 21:08

## 2019-01-21 RX ADMIN — MAGNESIUM SULFATE IN WATER 2 G: 40 INJECTION, SOLUTION INTRAVENOUS at 06:34

## 2019-01-21 RX ADMIN — POTASSIUM CHLORIDE 10 MEQ: 7.46 INJECTION, SOLUTION INTRAVENOUS at 10:47

## 2019-01-21 RX ADMIN — PROCHLORPERAZINE EDISYLATE 10 MG: 5 INJECTION INTRAMUSCULAR; INTRAVENOUS at 03:58

## 2019-01-21 ASSESSMENT — ENCOUNTER SYMPTOMS
HALLUCINATIONS: 0
SPEECH CHANGE: 0
NAUSEA: 1
COUGH: 1
HEARTBURN: 0
ABDOMINAL PAIN: 1
WEAKNESS: 0
FLANK PAIN: 0
CHILLS: 0
FOCAL WEAKNESS: 0
FEVER: 0
DOUBLE VISION: 0
BRUISES/BLEEDS EASILY: 0
SINUS PAIN: 1
BLURRED VISION: 0
SENSORY CHANGE: 0
LOSS OF CONSCIOUSNESS: 0
HEADACHES: 0
COUGH: 0
BACK PAIN: 1
DIZZINESS: 0
EYE DISCHARGE: 0
ABDOMINAL PAIN: 0
MYALGIAS: 0
VOMITING: 1
DIARRHEA: 0
HEMOPTYSIS: 0
SHORTNESS OF BREATH: 0
VOMITING: 0
DEPRESSION: 0
PALPITATIONS: 0

## 2019-01-21 ASSESSMENT — PAIN SCALES - GENERAL
PAINLEVEL_OUTOF10: 8
PAINLEVEL_OUTOF10: 0
PAINLEVEL_OUTOF10: 7
PAINLEVEL_OUTOF10: 4
PAINLEVEL_OUTOF10: 8

## 2019-01-21 ASSESSMENT — LIFESTYLE VARIABLES: SUBSTANCE_ABUSE: 0

## 2019-01-21 NOTE — PROGRESS NOTES
Hospital Medicine Daily Progress Note    Date of Service  1/21/2019    Chief Complaint  28 y.o. female admitted 1/21/2019 with N/V    Hospital Course    pt in house 2 weeks ago for DKA.  Presents with several      Interval Problem Update  C/o some N and back pain, both better today.  V overnight but none this am  Sinus [id number]  SBP 120s  UOP 400ml/4hrs  RA      Consultants/Specialty  Pulmonology    Code Status  full    Disposition  Cont in ICU while actively treating DKA  Critical care time 40mins.  I managed the pt's DKA through the day following serial lab, titrating IV insulin gtts as well as dextrose containing fluids.  Following and replacing elcetrolyte deficits q4 hrs and transitioning pt off the protocol and onto NPH/SSI    Review of Systems  Review of Systems   Constitutional: Negative for chills and fever.   Respiratory: Negative for cough and shortness of breath.    Cardiovascular: Negative for chest pain.   Gastrointestinal: Positive for nausea. Negative for abdominal pain, diarrhea and vomiting.   Musculoskeletal: Positive for back pain.   Skin: Negative for rash.   Neurological: Negative for dizziness, loss of consciousness and headaches.        Physical Exam  Temp:  [36.1 °C (97 °F)-36.2 °C (97.1 °F)] 36.2 °C (97.1 °F)  Pulse:  [120-146] 146  Resp:  [15-29] 25  BP: (124)/(85) 124/85  SpO2:  [96 %-99 %] 99 %    Physical Exam   Constitutional: She is oriented to person, place, and time. She appears well-developed and well-nourished. No distress.   HENT:   Head: Normocephalic and atraumatic.   Neck: No JVD present.   Cardiovascular: Normal rate and regular rhythm.    Pulmonary/Chest: Effort normal. No stridor. No respiratory distress. She has no wheezes. She has no rales.   Abdominal: Soft. There is no tenderness. There is no rebound and no guarding.   Musculoskeletal: She exhibits no edema.   Neurological: She is oriented to person, place, and time.   Skin: Skin is warm and dry. No rash noted. She is  not diaphoretic. There is pallor.   Psychiatric: She has a normal mood and affect. Thought content normal.   Nursing note and vitals reviewed.      Fluids    Intake/Output Summary (Last 24 hours) at 01/21/19 0550  Last data filed at 01/21/19 0250   Gross per 24 hour   Intake             2000 ml   Output                0 ml   Net             2000 ml       Laboratory  Recent Labs      01/21/19   0050   WBC  10.3   RBC  5.41*   HEMOGLOBIN  15.5   HEMATOCRIT  46.8   MCV  86.5   MCH  28.7   MCHC  33.1*   RDW  40.4   PLATELETCT  318   MPV  10.5     Recent Labs      01/21/19   0050   SODIUM  129*   POTASSIUM  4.8   CHLORIDE  96   CO2  8*   GLUCOSE  564*   BUN  18   CREATININE  1.00   CALCIUM  10.4                   Imaging  DX-CHEST-LIMITED (1 VIEW)   Final Result      No evidence of pneumothorax following placement of LEFT neck catheter           Assessment/Plan  * DKA (diabetic ketoacidoses) ([facility])   Assessment & Plan    On protocol  Cont to titrate D5LR and insulin gtts  Following q4 lab  Replacing K, Mg, Phos as indicated by lab  Etiology is likely due to gastroparesis  Cont to be vigilante for other possible trigger     Coffee ground emesis   Assessment & Plan    Noted at bedside, with hx of GERD and diffuse abd pain   No melanotic stools   Hgb drop consistent with dilution from fluid resuscitation  No further signs of bleeding  Suspect MW tear.  Will hold on GI consult for now but have low thershold to initiate       Gastroparesis due to DM ([facility])- (present on admission)   Assessment & Plan    Resume home reglan and erythromycin   Prn anti emetics ordered     GERD (gastroesophageal reflux disease)- (present on admission)   Assessment & Plan    Hx of on omeprazole at home     Dyslipidemia- (present on admission)   Assessment & Plan    Resume statin when able          VTE prophylaxis: ambulatory

## 2019-01-21 NOTE — CARE PLAN
Problem: Communication  Goal: The ability to communicate needs accurately and effectively will improve  Outcome: PROGRESSING SLOWER THAN EXPECTED  Pt with increased lethargy, making few movements due to increased nausea and vomiting with motion. Pt does not use call light at this time. Pt assessed q hour during q 1 hour FSBS. Continue to check on pt q hour.      Problem: Venous Thromboembolism (VTW)/Deep Vein Thrombosis (DVT) Prevention:  Goal: Patient will participate in Venous Thrombosis (VTE)/Deep Vein Thrombosis (DVT)Prevention Measures  Outcome: NOT MET  No VTE prophylaxis order at this time due to pt's recent coffee ground emesis over the past 24 hours. Pt receiving daily protonix. Will continue to monitor and assess.     Problem: Pain Management  Goal: Pain level will decrease to patient's comfort goal  Outcome: PROGRESSING AS EXPECTED  Pt with chronic back pain and pain related to gastroparesis. Fentanyl 50mcg, has been effective in relieving pt's chronic pain. Pt also has acute pain related to nausea and vomiting. Administering Benadryl intermittently for nausea per Dr. Ocampo in addition to Compazine. Unable to upgrade to Phenergan due to pt allergy. Will continue to monitor and assess. Per Dr. Ocampo, ok for pt to have sips of water /ice chips while still nauseous and vomiting.

## 2019-01-21 NOTE — H&P
Hospital Medicine History & Physical Note    Date of Service  1/21/2019    Primary Care Physician  Navi Huber M.D.    Consultants  pma    Code Status  full    Chief Complaint  abd pain n/v    History of Presenting Illness  28 y.o. female who presented 1/21/2019 with Past medical history of type 1 diabetes and previous DKA who presents with abdomina pain, n/v uncontrolled blood sugars.  This patient developed upper respiratory infection yesterday with runny nose cough congestion headache chills.  Today this patient had blood sugars of 412 at home with associated nausea vomiting abdominal flank pain started earlier today. Symptoms are worsening.  This is consistent with her previous episodes of DKA.  She is also had multiple episodes of vomiting she has been using her home diabetes medication regimen and is compliant with her medications.  No fever no dysuria no diarrhea no constipation.  No recent travel no drug use.  She otherwise has no known alleviating or exacerbating factors to her symptoms.    Review of Systems  Review of Systems   Constitutional: Negative for chills and fever.   HENT: Positive for congestion and sinus pain. Negative for hearing loss and tinnitus.    Eyes: Negative for blurred vision, double vision and discharge.   Respiratory: Positive for cough. Negative for hemoptysis and shortness of breath.    Cardiovascular: Negative for chest pain, palpitations and leg swelling.   Gastrointestinal: Positive for abdominal pain, nausea and vomiting. Negative for heartburn.   Genitourinary: Negative for dysuria and flank pain.   Musculoskeletal: Negative for joint pain and myalgias.   Skin: Negative for rash.   Neurological: Negative for dizziness, sensory change, speech change, focal weakness and weakness.   Endo/Heme/Allergies: Negative for environmental allergies. Does not bruise/bleed easily.   Psychiatric/Behavioral: Negative for depression, hallucinations and substance abuse.       Past  Medical History   has a past medical history of Backpain; Bronchitis; Diabetes type 1, controlled ([facility]); DKA (diabetic ketoacidoses) (HCC); Fall; Gastroparesis; Kidney infection; Pneumonia; and UTI (urinary tract infection).    Surgical History   has a past surgical history that includes gastroscopy-endo (9/18/2014); gastroscopy with biopsy (9/18/2014); other; submandible abscess incision and drainage (Left, 11/8/2017); dental extraction(s) (11/8/2017); and gastroscopy-endo (N/A, 6/9/2018).     Family History  family history includes Cancer in her unknown relative; Hypertension in her maternal grandfather.     Social History   reports that she has never smoked. She has never used smokeless tobacco. She reports that she does not drink alcohol or use drugs.    Allergies  Allergies   Allergen Reactions   • Pcn [Penicillins] Shortness of Breath and Swelling     Per patient's Mom, patient tolerates Keflex   • Lisinopril Unspecified     Per historical: Reported pedal swelling. No facial/angioedema or rash nor respiratory symptoms.    • Promethazine Vomiting       Medications  Prior to Admission Medications   Prescriptions Last Dose Informant Patient Reported? Taking?   Cholecalciferol 2000 UNIT Cap  Patient Yes No   Sig: Take 2,000 Units by mouth every day.   Insulin Glargine (BASAGLAR KWIKPEN) 100 UNIT/ML Solution Pen-injector  Patient Yes No   Sig: Inject 32 Units as instructed 2 Times a Day.   Omega-3 Fatty Acids (OMEGA 3 PO)  Patient Yes No   Sig: Take 1 Dose by mouth every day. Unknown OTC Strength   atorvastatin (LIPITOR) 20 MG Tab  Patient No No   Sig: Take 1 Tab by mouth every day.   erythromycin base (UMU-TAB) 250 MG Tablet Delayed Response   No No   Sig: Take 1 Tab by mouth 4 Times a Day,Before Meals and at Bedtime.   ferrous sulfate 325 (65 Fe) MG tablet  Patient Yes No   Sig: Take 325 mg by mouth every day.   insulin glulisine (APIDRA) 100 UNIT/ML Solution Pen-injector injection  Patient Yes No   Sig: Inject  2-20 Units as instructed 3 times a day, with meals. FSBS base 150  For every 50 increase in blood sugar insulin doubles  200 = 2 units  250 = 4 units  300 = 8 units  350 =  16 units  400 = 32 units    metoclopramide (REGLAN) 10 MG/10ML Solution  Patient Yes No   Sig: Take 10 mg by mouth 3 times a day before meals.   omeprazole (PRILOSEC) 20 MG delayed-release capsule  Patient No No   Sig: Take 1 Cap by mouth every day.      Facility-Administered Medications: None       Physical Exam  Temp:  [36.1 °C (97 °F)] 36.1 °C (97 °F)  Pulse:  [120] 120  Resp:  [15] 15  BP: (124)/(85) 124/85  SpO2:  [97 %] 97 %    Physical Exam   Constitutional: She is oriented to person, place, and time. She appears well-developed and well-nourished. She appears distressed.   HENT:   Head: Normocephalic and atraumatic.   Coffee ground emesis noted at bedside, Dry oral mucosa   Eyes: Pupils are equal, round, and reactive to light. Conjunctivae and EOM are normal.   Neck: Normal range of motion. Neck supple. No JVD present.   Cardiovascular: Regular rhythm, normal heart sounds and intact distal pulses.    No murmur heard.  tachycardia   Pulmonary/Chest: Effort normal and breath sounds normal. No respiratory distress. She has no wheezes.   tachypnea   Abdominal: Soft. Bowel sounds are normal. She exhibits no distension. There is tenderness.   Diffuse abd pain   Musculoskeletal: Normal range of motion. She exhibits no edema.   Neurological: She is alert and oriented to person, place, and time. She exhibits normal muscle tone.   Skin: Skin is warm and dry.   Psychiatric: She has a normal mood and affect. Her behavior is normal. Judgment and thought content normal.   Nursing note and vitals reviewed.      Laboratory:  Recent Labs      01/21/19   0050   WBC  10.3   RBC  5.41*   HEMOGLOBIN  15.5   HEMATOCRIT  46.8   MCV  86.5   MCH  28.7   MCHC  33.1*   RDW  40.4   PLATELETCT  318   MPV  10.5     Recent Labs      01/21/19   0050   SODIUM  129*    POTASSIUM  4.8   CHLORIDE  96   CO2  8*   GLUCOSE  564*   BUN  18   CREATININE  1.00   CALCIUM  10.4     Recent Labs      01/21/19   0050   ALTSGPT  19   ASTSGOT  12   ALKPHOSPHAT  118*   TBILIRUBIN  0.6   LIPASE  10*   GLUCOSE  564*                 No results for input(s): TROPONINI in the last 72 hours.    Urinalysis:    No results found     Imaging:  No orders to display         Assessment/Plan:  I anticipate this patient will require at least two midnights for appropriate medical management, necessitating inpatient admission.    * DKA (diabetic ketoacidoses) ([facility])   Assessment & Plan    DKA with significant acidosis   Will give 4 L of LR for now then recheck BS before insulin bolus discussed with pharmacy   IV insulin and serial BMP per DKA protocol   Monitor and correct electrolyte deficiencies  Diabetes education   Transition to subq insulin as clinically appropriate     Coffee ground emesis   Assessment & Plan    Noted at bedside, with hx of GERD and diffuse abd pain   No melanotic stools per report and hgb stable  Will trend hgb   IV PPI   Monitor for resolution of symptoms   Consider GI consultation in am     Gastroparesis due to DM (HCC)- (present on admission)   Assessment & Plan    Resume home reglan and erythromycin   Prn anti emetics ordered     GERD (gastroesophageal reflux disease)- (present on admission)   Assessment & Plan    Hx of on omeprazole at home     Dyslipidemia- (present on admission)   Assessment & Plan    Resume statin when able         VTE prophylaxis: heparin    I spent a total of 35 minutes of critical care time during this clinical encounter, This time includes no procedures or overlap with other providers.

## 2019-01-21 NOTE — ASSESSMENT & PLAN NOTE
DKA resolved  Likely secondary to her gastroparesis patient reports being compliant with her insulin and no other triggers identified  Continue NPH and Humulin  Monitor CBGs and adjust accordingly

## 2019-01-21 NOTE — ED NOTES
Pt continuing to vomit at this time, ERP notified and orders given, pt is also tachycardic, orders given for second liter of LR to run concurrently with first. Pt states that she does have some relief from initial symptoms

## 2019-01-21 NOTE — PROGRESS NOTES
Med rec complete per pt's mother at bedside. If any changes need to be made, mother will update RN with updated information.

## 2019-01-21 NOTE — PROGRESS NOTES
· 2 RN skin check complete with CAIT [place]  · Skin intact other than flaky skin on bilateral feet, calloused heels, and scattered scrapes throughout extremities.

## 2019-01-21 NOTE — THERAPY
PT eval order received and attempted. RN requesting defer at this time due to nausea and vomiting. Will attempt again as able and as pt agrees.

## 2019-01-21 NOTE — PROGRESS NOTES
RCC    Lethargic - Ox4  I drip 4  NPO  Ok UO  D5LR  Mobilizes  RA  CXR clear  PPI  DKA ERP  AG 13, HCO3 12    D/w ICU team

## 2019-01-21 NOTE — ED PROVIDER NOTES
ED Provider Note    CHIEF COMPLAINT  Chief Complaint   Patient presents with   • N/V   • Hyperglycemia   • Abdominal Pain   • Flank Pain        [facility]    Primary care provider: Navi Huber M.D.   History obtained from: Patient and mother  History limited by: None     Alis Sparks is a 28 y.o. female who presents to the ED with mother complaining of glucose of 412 at home associated with nausea/vomiting and abdominal/flank pain that started earlier today similar to past episodes of DKA.  Patient reports multiple episodes of vomiting without gross blood noted.  Patient was recently discharged from this hospital for DKA and states that she has been eating and using her medications as directed so unsure why this has happened again.  She denies any fever.  She denies possibility of pregnancy.  She denies diarrhea/constipation/dysuria.  She has had slight cough.  No recent foreign travel/suspicious oral intake/ill contacts.  She is still taking erythromycin for her gastroparesis.    REVIEW OF SYSTEMS  Please see HPI for pertinent positives/negatives.  All other systems reviewed and are negative.     PAST MEDICAL HISTORY  Past Medical History:   Diagnosis Date   • Backpain    • Bronchitis    • Diabetes type 1, controlled (HCC)     tests 4-5 times daily   • DKA (diabetic ketoacidoses) ([facility])    • Fall    • Gastroparesis    • Kidney infection    • Pneumonia    • UTI (urinary tract infection)         SURGICAL HISTORY  Past Surgical History:   Procedure Laterality Date   • GASTROSCOPY-ENDO N/A 6/9/2018    Procedure: GASTROSCOPY-ENDO WITH BIOPSIES AND DIALATION;  Surgeon: Marino Black M.D.;  Location: SURGERY [facility];  Service: Gastroenterology   • SUBMANDIBLE ABSCESS INCISION AND DRAINAGE Left 11/8/2017    Procedure: SUBMANDIBLE ABSCESS INCISION AND DRAINAGE;  Surgeon: Osman Young M.D.;  Location: SURGERY [facility];  Service: Dental   • DENTAL EXTRACTION(S)  11/8/2017    Procedure:  DENTAL EXTRACTION(S);  Surgeon: Osman Young M.D.;  Location: SURGERY [facility];  Service: Dental   • GASTROSCOPY-ENDO  9/18/2014    Performed by Sylvain PERRY M.D. at ENDOSCOPY [facility]   • GASTROSCOPY WITH BIOPSY  9/18/2014    Performed by Sylvain PERRY M.D. at ENDOSCOPY [facility]   • OTHER      surgery to patch lung after pneumor from central line placement        SOCIAL HISTORY  Social History     Social History Main Topics   • Smoking status: Never Smoker   • Smokeless tobacco: Never Used   • Alcohol use No   • Drug use: No   • Sexual activity: No        FAMILY HISTORY  Family History   Problem Relation Age of Onset   • Cancer Unknown         breasts, multiple family members   • Hypertension Maternal Grandfather         CURRENT MEDICATIONS  Home Medications     Reviewed by Raquel De Guzman R.N. (Registered Nurse) on 01/21/19 at 0010  Med List Status: Partial   Medication Last Dose Status   atorvastatin (LIPITOR) 20 MG Tab  Active   Cholecalciferol 2000 UNIT Cap  Active   erythromycin base (UMU-TAB) 250 MG Tablet Delayed Response  Active   ferrous sulfate 325 (65 Fe) MG tablet  Active   Insulin Glargine (BASAGLAR KWIKPEN) 100 UNIT/ML Solution Pen-injector  Active   insulin glulisine (APIDRA) 100 UNIT/ML Solution Pen-injector injection  Active   metoclopramide (REGLAN) 10 MG/10ML Solution  Active   Omega-3 Fatty Acids (OMEGA 3 PO)  Active   omeprazole (PRILOSEC) 20 MG delayed-release capsule  Active                 ALLERGIES  Allergies   Allergen Reactions   • Pcn [Penicillins] Shortness of Breath and Swelling     Per patient's Mom, patient tolerates Keflex   • Lisinopril Unspecified     Per historical: Reported pedal swelling. No facial/angioedema or rash nor respiratory symptoms.    • Promethazine Vomiting        PHYSICAL EXAM  VITAL SIGNS: /85   Pulse (!) 120   Temp 36.1 °C (97 °F) (Temporal)   Resp 15   Wt 75.6 kg (166 lb 10.7 oz)   LMP 01/14/2019 (Exact Date)   SpO2 97%   BMI  28.59 kg/m²  @AMBROSIO[[id number]::@     Pulse ox interpretation: 97% I interpret this pulse ox as normal     Cardiac monitor interpretation: Sinus rhythm with heart rate in the 110-120s as interpreted by me.  The patient presented with tachycardia and possible DKA and cardiac monitor was ordered to monitor for dysrhythmia.    Constitutional: Well developed, well nourished, alert in discomfort, nontoxic appearance   HENT: No external signs of trauma, normocephalic, oropharynx moist and clear, nose normal    Eyes: PERRL, conjunctiva without erythema, no discharge, no icterus    Neck: Soft and supple, trachea midline, no stridor, no tenderness, no LAD, no JVD, good ROM    Cardiovascular: Tachycardia, no murmurs/rubs/gallops, strong distal pulses and good perfusion    Thorax & Lungs: No respiratory distress, CTAB   Abdomen: Soft, mild diffuse tenderness to palpation, nondistended, no guarding, no rebound, normal BS    Back: Bilateral CVAT    Extremities: No cyanosis, no edema, no gross deformity, good ROM, no tenderness, intact distal pulses with brisk cap refill    Skin: Warm, dry, no pallor/cyanosis, no rash noted except multiple areas of bruising from past IVs and blood draws  Lymphatic: No lymphadenopathy noted    Neuro: A/O times 3, no focal deficits noted    Psychiatric: Cooperative, slightly anxious        DIAGNOSTIC STUDIES / PROCEDURES        LABS  All labs reviewed by me.     Results for orders placed or performed during the hospital encounter of 01/21/19   CBC WITH DIFFERENTIAL   Result Value Ref Range    WBC 10.3 4.8 - 10.8 K/uL    RBC 5.41 (H) 4.20 - 5.40 M/uL    Hemoglobin 15.5 12.0 - 16.0 g/dL    Hematocrit 46.8 37.0 - 47.0 %    MCV 86.5 81.4 - 97.8 fL    MCH 28.7 27.0 - 33.0 pg    MCHC 33.1 (L) 33.6 - 35.0 g/dL    RDW 40.4 35.9 - 50.0 fL    Platelet Count 318 164 - 446 K/uL    MPV 10.5 9.0 - 12.9 fL    Neutrophils-Polys 63.70 44.00 - 72.00 %    Lymphocytes 27.50 22.00 - 41.00 %    Monocytes 6.60 0.00 - 13.40 %     Eosinophils 0.60 0.00 - 6.90 %    Basophils 0.60 0.00 - 1.80 %    Immature Granulocytes 1.00 (H) 0.00 - 0.90 %    Nucleated RBC 0.00 /100 WBC    Neutrophils (Absolute) 6.55 2.00 - 7.15 K/uL    Lymphs (Absolute) 2.82 1.00 - 4.80 K/uL    Monos (Absolute) 0.68 0.00 - 0.85 K/uL    Eos (Absolute) 0.06 0.00 - 0.51 K/uL    Baso (Absolute) 0.06 0.00 - 0.12 K/uL    Immature Granulocytes (abs) 0.10 0.00 - 0.11 K/uL    NRBC (Absolute) 0.00 K/uL   COMP METABOLIC PANEL   Result Value Ref Range    Sodium 129 (L) 135 - 145 mmol/L    Potassium 4.8 3.6 - 5.5 mmol/L    Chloride 96 96 - 112 mmol/L    Co2 8 (LL) 20 - 33 mmol/L    Anion Gap 25.0 (H) 0.0 - 11.9    Glucose 564 (HH) 65 - 99 mg/dL    Bun 18 8 - 22 mg/dL    Creatinine 1.00 0.50 - 1.40 mg/dL    Calcium 10.4 8.5 - 10.5 mg/dL    AST(SGOT) 12 12 - 45 U/L    ALT(SGPT) 19 2 - 50 U/L    Alkaline Phosphatase 118 (H) 30 - 99 U/L    Total Bilirubin 0.6 0.1 - 1.5 mg/dL    Albumin 5.3 (H) 3.2 - 4.9 g/dL    Total Protein 8.5 (H) 6.0 - 8.2 g/dL    Globulin 3.2 1.9 - 3.5 g/dL    A-G Ratio 1.7 g/dL   LIPASE   Result Value Ref Range    Lipase 10 (L) 11 - 82 U/L   MAGNESIUM   Result Value Ref Range    Magnesium 2.3 1.5 - 2.5 mg/dL   ESTIMATED GFR   Result Value Ref Range    GFR If African American >60 >60 mL/min/1.73 m 2    GFR If Non African American >60 >60 mL/min/1.73 m 2        RADIOLOGY  The radiologist's interpretation of all radiological studies have been reviewed by me.     No orders to display          COURSE & MEDICAL DECISION MAKING  Nursing notes, VS, PMSFHx reviewed in chart.     Review of past medical records shows the patient was last admitted January 9, 2019 for DKA with nausea and vomiting and discharged on January 15, 2019.  Patient has had CT abdomen/pelvis multiple times with her last CT on December 20, 2018.  Patient had upper GI series June 19, 2018.  She had a HIDA scan Ana Maria 10, 2018 and gastric emptying study December 29, 2018.  Echocardiogram was performed June 4,  2018.  Patient has had multiple past admissions for DKA.      Differential diagnoses considered include but are not limited to: Appendicitis, gallstone/biliary colic, pancreatitis, ileus, enteritis/colitis, DKA, dehydration, electrolyte abnormality       0140: D/W Dr. Pierson, hospitalist, who will admit patient      History and physical exam as above.  Patient's clinical presentation is similar to her past admission for DKA.  Labs confirmed likely DKA.  IV fluid was initiated.  Patient was treated initially with Reglan for nausea and morphine for pain.  She continued to have vomiting and was subsequently given Zofran.  I discussed the findings with patient and her mother and they are agreeable to admission.  Discussed with Dr. Pierson who graciously agreed to admit patient for further care.      HYDRATION: Based on the patient's presentation of DKA the patient was given IV fluids. IV Hydration was used because oral hydration was not as rapid as required. Upon recheck following hydration, the patient was Unchanged.      CRITICAL CARE NOTE:  Total critical care time spent on this patient was 30 minutes exclusive of separately billable procedures.  This may include direct bedside patient care, speaking with family members, review of past medical records, reviewing the results of laboratory/diagnostic studies, consulting with other physicians, as well as evaluating the response to the therapy instituted.          FINAL IMPRESSION  1. Diabetic ketoacidosis without coma associated with type 1 diabetes mellitus (HCC) Acute   2. Intractable vomiting with nausea, unspecified vomiting type Acute   3. Generalized abdominal pain Acute          DISPOSITION  Patient will be admitted to the ICU by hospitalist in guarded condition      Electronically signed by: Roque Muñoz, 1/21/2019 12:21 AM      Portions of this record were made with voice recognition software.  Despite my review, spelling/grammar/context errors may still remain.   Interpretation of this chart should be taken in this context.

## 2019-01-21 NOTE — ED NOTES
PIV established with US guidance. Pt nauseous, medicated as ordered. Pt c/o pain all over, ERP notified and medication orders given

## 2019-01-21 NOTE — ED TRIAGE NOTES
Chief Complaint   Patient presents with   • N/V   • Hyperglycemia   • Abdominal Pain   • Flank Pain     Patient to triage via hospital wheelchair. Patient is a type one diabetic. Patient reports not feeling well today. Patient then woke up with continuous vomiting. Patient had a reported blood glucose of 412 at home. Patient recently discharged from ICU. Charge RN updated on patient. /85   Pulse (!) 120   Temp 36.1 °C (97 °F) (Temporal)   Resp 15   Wt 75.6 kg (166 lb 10.7 oz)   LMP 01/14/2019 (Exact Date)   SpO2 97%   BMI 28.59 kg/m²

## 2019-01-21 NOTE — ED NOTES
Tech from Lab called with critical result of Glucose 564 and CO2 8 at 0138. Critical lab result read back to tech.   Dr. Ordonez notified of critical lab result at 0138.  Critical lab result read back by Dr. ordonez.

## 2019-01-21 NOTE — PROCEDURES
"Date: 1/21/2019    Procedure:  Central Venous Catheter Insertion    Indication: access    Physician:  Hugo Joiner M.D.    Consent:  Obtained; time out performed    Procedure:  The patient was placed in the [place] position.  Appropriate \"time out\" was performed.  The left neck was prepped and draped in the usual sterile fashion.  Under full body sterile barrier precautions, a triple lumen central venous catheter was placed without difficulty in the left IJ position.  US was used for localization and placement.  All lumens were flushed with sterile saline and sterile caps were applied.  The line was sutured in place and a sterile dressing was applied.  There were no immediate complications.  A post-procedure CXR will be reviewed.  Estimated blood loss < 5cc.      "

## 2019-01-21 NOTE — CONSULTS
DATE OF SERVICE:  01/21/2019    PULMONARY CRITICAL CARE CONSULTATION    REQUESTING PHYSICIAN:  Crys Pierson MD    REASON FOR REQUEST:  Diabetic ketoacidosis.    HISTORY OF PRESENT ILLNESS:  This is a 28-year-old female with   insulin-dependent diabetes complicated with neuropathy and gastroparesis,   dyslipidemia, hypertension and GERD that has had repeated admissions over the   past year with most recent being just a few days ago from 01/09 through the   15th with diabetic ketoacidosis as well as intractable nausea, vomiting, and   gastroparesis.  On this occasion, patient indicates that she was doing fine at   home; however, she started developing runny nose, upper respiratory type   symptoms and general malaise as well as her chronic nausea, vomiting, and   coffee-ground type emesis; therefore, presented back to the emergency   department with acute DKA, severe anion gap acidosis and hyperglycemia.  She   denies actual headache or visual changes.  No neck stiffness.  No shortness of   breath.  She has had cough, again runny nose and general unwell feeling.  She   has some abdominal discomfort secondary to the repeated nausea, vomiting.  No   significant dysuria or diarrhea.    ALLERGIES:  PENICILLIN, LISINOPRIL, PROMETHAZINE.    OUTPATIENT MEDICATIONS:  Erythromycin, omeprazole, atorvastatin, omega-3,   insulin glargine, Apidra, Reglan, iron sulfate.    PAST MEDICAL HISTORY:  As indicated above in HPI.    SOCIAL HISTORY:  Denies any alcohol or tobacco use.    FAMILY HISTORY:  Significant for hypertension.    REVIEW OF SYSTEMS:  As indicated above in HPI, otherwise fully reviewed and   negative.    PHYSICAL EXAMINATION:  VITAL SIGNS:  Heart rate 146, blood pressure 132/58, satting 99% on room air.  GENERAL:  Patient is acute on chronically ill, much older than stated age.  HEENT:  Normocephalic, atraumatic.  Anicteric.  CARDIOVASCULAR:  Tachycardic rate, regular rhythm.  LUNGS:  Clear to auscultation  bilaterally.  No wheezes, rales, or rhonchi.  ABDOMEN:  Soft, nontender and nondistended.  Positive bowel sounds.  EXTREMITIES:  Without edema.  NEUROLOGIC:  Cranial nerves II-XII appear grossly intact.  PSYCHIATRIC:  Slightly odd affect.    RESULTS:  White count 10.3, H and H of 15 and 46, platelet count 318, no left   shift or bands.  Sodium 129, potassium 4.8, chloride 96, bicarbonate 8, anion   gap 25, glucose 564, BUN 18, creatinine 1.  AST 12, ALT 19, alkaline   phosphatase 118, total bilirubin 0.6, magnesium 2.3, lipase 10.  Urinalysis   with greater than 1000 glucose, greater than 160 ketones, 0-2 wbc's, no   leukocyte esterase or nitrites, negative beta hCG.    ASSESSMENT AND PLAN:  1.  Diabetic ketoacidosis.  2.  Possible viral upper respiratory tract infection.  3.  Anion gap metabolic acidosis.  4.  Pseudohyponatremia.  5.  Poorly controlled insulin-dependent diabetes.  6.  Neuropathy.  7.  Gastroparesis.  8.  Intractable nausea and vomiting.  9.  Chronic dyslipidemia.  10.  Hypertension.  11.  Gastroesophageal reflux disease.    PLAN:  Again, this is a 28-year-old female with above history and presentation   that has had numerous repeated admissions for DKA, here approximately 5 days   after last discharge with complaints of upper respiratory type infection.  At   this time, she is 100% on room air with negative physical exam, she does have   nausea and vomiting, this is attributed to her chronic gastroparesis.  At this   time, she has been placed on diabetic ketoacidosis protocol.  We will check   serial fingerstick blood sugars q. 1 hourly, titrate insulin infusion   accordingly.  She will be on aggressive electrolyte repletement.  We will   check a influenza A, B, PCR.  We will check q. 4 BMPs to monitor anion gap as   well as improvement in CO2.  Patient will be on aspiration precautions.  We   will continue her home regimen for her gastroparesis to include erythromycin   and Reglan.  Once her gap  is closed, we will anticipate transition over to   subQ insulin at that time and diabetic diet if tolerated.  Meanwhile, we will   continue maintenance IV fluids given her prerenal dehydrated state.  Patient   will need further counseling on improved control of her diabetes.  At this   rate, she will suffer chronic advancing multiorgan failure as well as high   likelihood of premature death.    Total critical care time not including billable procedures is 40 minutes.       ____________________________________     MD SACHIN Jacobo / NTS    DD:  01/21/2019 04:07:16  DT:  01/21/2019 05:04:26    D#:  [id number]  Job#:  [id number]

## 2019-01-22 LAB
ANION GAP SERPL CALC-SCNC: 5 MMOL/L (ref 0–11.9)
ANION GAP SERPL CALC-SCNC: 5 MMOL/L (ref 0–11.9)
BASOPHILS # BLD AUTO: 0.6 % (ref 0–1.8)
BASOPHILS # BLD: 0.04 K/UL (ref 0–0.12)
BUN SERPL-MCNC: 11 MG/DL (ref 8–22)
BUN SERPL-MCNC: 12 MG/DL (ref 8–22)
CALCIUM SERPL-MCNC: 8.8 MG/DL (ref 8.5–10.5)
CALCIUM SERPL-MCNC: 8.8 MG/DL (ref 8.5–10.5)
CHLORIDE SERPL-SCNC: 110 MMOL/L (ref 96–112)
CHLORIDE SERPL-SCNC: 111 MMOL/L (ref 96–112)
CO2 SERPL-SCNC: 20 MMOL/L (ref 20–33)
CO2 SERPL-SCNC: 22 MMOL/L (ref 20–33)
CREAT SERPL-MCNC: 0.55 MG/DL (ref 0.5–1.4)
CREAT SERPL-MCNC: 0.57 MG/DL (ref 0.5–1.4)
EOSINOPHIL # BLD AUTO: 0.12 K/UL (ref 0–0.51)
EOSINOPHIL NFR BLD: 1.7 % (ref 0–6.9)
ERYTHROCYTE [DISTWIDTH] IN BLOOD BY AUTOMATED COUNT: 41.7 FL (ref 35.9–50)
GLUCOSE BLD-MCNC: 129 MG/DL (ref 65–99)
GLUCOSE BLD-MCNC: 152 MG/DL (ref 65–99)
GLUCOSE BLD-MCNC: 152 MG/DL (ref 65–99)
GLUCOSE BLD-MCNC: 179 MG/DL (ref 65–99)
GLUCOSE BLD-MCNC: 245 MG/DL (ref 65–99)
GLUCOSE BLD-MCNC: 56 MG/DL (ref 65–99)
GLUCOSE BLD-MCNC: 97 MG/DL (ref 65–99)
GLUCOSE SERPL-MCNC: 105 MG/DL (ref 65–99)
GLUCOSE SERPL-MCNC: 171 MG/DL (ref 65–99)
HCT VFR BLD AUTO: 33.5 % (ref 37–47)
HGB BLD-MCNC: 10.9 G/DL (ref 12–16)
HGB BLD-MCNC: 11 G/DL (ref 12–16)
IMM GRANULOCYTES # BLD AUTO: 0.04 K/UL (ref 0–0.11)
IMM GRANULOCYTES NFR BLD AUTO: 0.6 % (ref 0–0.9)
LYMPHOCYTES # BLD AUTO: 2.94 K/UL (ref 1–4.8)
LYMPHOCYTES NFR BLD: 42.4 % (ref 22–41)
MAGNESIUM SERPL-MCNC: 1.7 MG/DL (ref 1.5–2.5)
MCH RBC QN AUTO: 28.5 PG (ref 27–33)
MCHC RBC AUTO-ENTMCNC: 32.8 G/DL (ref 33.6–35)
MCV RBC AUTO: 86.8 FL (ref 81.4–97.8)
MONOCYTES # BLD AUTO: 0.62 K/UL (ref 0–0.85)
MONOCYTES NFR BLD AUTO: 8.9 % (ref 0–13.4)
NEUTROPHILS # BLD AUTO: 3.18 K/UL (ref 2–7.15)
NEUTROPHILS NFR BLD: 45.8 % (ref 44–72)
NRBC # BLD AUTO: 0 K/UL
NRBC BLD-RTO: 0 /100 WBC
PHOSPHATE SERPL-MCNC: 2.2 MG/DL (ref 2.5–4.5)
PLATELET # BLD AUTO: 195 K/UL (ref 164–446)
PMV BLD AUTO: 9.6 FL (ref 9–12.9)
POTASSIUM SERPL-SCNC: 3.5 MMOL/L (ref 3.6–5.5)
POTASSIUM SERPL-SCNC: 3.7 MMOL/L (ref 3.6–5.5)
RBC # BLD AUTO: 3.86 M/UL (ref 4.2–5.4)
SODIUM SERPL-SCNC: 135 MMOL/L (ref 135–145)
SODIUM SERPL-SCNC: 138 MMOL/L (ref 135–145)
WBC # BLD AUTO: 6.9 K/UL (ref 4.8–10.8)

## 2019-01-22 PROCEDURE — 83735 ASSAY OF MAGNESIUM: CPT

## 2019-01-22 PROCEDURE — C9113 INJ PANTOPRAZOLE SODIUM, VIA: HCPCS | Performed by: HOSPITALIST

## 2019-01-22 PROCEDURE — 99232 SBSQ HOSP IP/OBS MODERATE 35: CPT | Performed by: HOSPITALIST

## 2019-01-22 PROCEDURE — A9270 NON-COVERED ITEM OR SERVICE: HCPCS | Performed by: HOSPITALIST

## 2019-01-22 PROCEDURE — 84100 ASSAY OF PHOSPHORUS: CPT

## 2019-01-22 PROCEDURE — 700102 HCHG RX REV CODE 250 W/ 637 OVERRIDE(OP): Performed by: HOSPITALIST

## 2019-01-22 PROCEDURE — 82962 GLUCOSE BLOOD TEST: CPT

## 2019-01-22 PROCEDURE — 700111 HCHG RX REV CODE 636 W/ 250 OVERRIDE (IP): Performed by: HOSPITALIST

## 2019-01-22 PROCEDURE — 770001 HCHG ROOM/CARE - MED/SURG/GYN PRIV*

## 2019-01-22 PROCEDURE — 80048 BASIC METABOLIC PNL TOTAL CA: CPT

## 2019-01-22 PROCEDURE — A9270 NON-COVERED ITEM OR SERVICE: HCPCS | Performed by: INTERNAL MEDICINE

## 2019-01-22 PROCEDURE — 700102 HCHG RX REV CODE 250 W/ 637 OVERRIDE(OP): Performed by: INTERNAL MEDICINE

## 2019-01-22 PROCEDURE — 85025 COMPLETE CBC W/AUTO DIFF WBC: CPT

## 2019-01-22 PROCEDURE — 85018 HEMOGLOBIN: CPT

## 2019-01-22 RX ORDER — POTASSIUM CHLORIDE 20 MEQ/1
40 TABLET, EXTENDED RELEASE ORAL ONCE
Status: COMPLETED | OUTPATIENT
Start: 2019-01-22 | End: 2019-01-22

## 2019-01-22 RX ORDER — METOCLOPRAMIDE HYDROCHLORIDE 5 MG/5ML
10 SOLUTION ORAL
Status: DISCONTINUED | OUTPATIENT
Start: 2019-01-23 | End: 2019-01-23 | Stop reason: HOSPADM

## 2019-01-22 RX ORDER — OMEPRAZOLE 20 MG/1
20 CAPSULE, DELAYED RELEASE ORAL DAILY
Status: DISCONTINUED | OUTPATIENT
Start: 2019-01-22 | End: 2019-01-23 | Stop reason: HOSPADM

## 2019-01-22 RX ADMIN — SENNOSIDES AND DOCUSATE SODIUM 2 TABLET: 8.6; 5 TABLET ORAL at 05:28

## 2019-01-22 RX ADMIN — OMEPRAZOLE 20 MG: 20 CAPSULE, DELAYED RELEASE ORAL at 17:41

## 2019-01-22 RX ADMIN — ERYTHROMYCIN 250 MG: 250 TABLET, DELAYED RELEASE ORAL at 17:41

## 2019-01-22 RX ADMIN — FENTANYL CITRATE 50 MCG: 50 INJECTION, SOLUTION INTRAMUSCULAR; INTRAVENOUS at 19:46

## 2019-01-22 RX ADMIN — PROCHLORPERAZINE EDISYLATE 10 MG: 5 INJECTION INTRAMUSCULAR; INTRAVENOUS at 05:28

## 2019-01-22 RX ADMIN — DIPHENHYDRAMINE HYDROCHLORIDE 50 MG: 50 INJECTION INTRAMUSCULAR; INTRAVENOUS at 21:19

## 2019-01-22 RX ADMIN — OXYCODONE HYDROCHLORIDE 5 MG: 5 TABLET ORAL at 05:29

## 2019-01-22 RX ADMIN — ATORVASTATIN CALCIUM 20 MG: 20 TABLET, FILM COATED ORAL at 05:29

## 2019-01-22 RX ADMIN — ERYTHROMYCIN 250 MG: 250 TABLET, DELAYED RELEASE ORAL at 21:00

## 2019-01-22 RX ADMIN — INSULIN HUMAN 2 UNITS: 100 INJECTION, SOLUTION PARENTERAL at 17:45

## 2019-01-22 RX ADMIN — INSULIN HUMAN 3 UNITS: 100 INJECTION, SOLUTION PARENTERAL at 21:12

## 2019-01-22 RX ADMIN — SENNOSIDES AND DOCUSATE SODIUM 2 TABLET: 8.6; 5 TABLET ORAL at 17:41

## 2019-01-22 RX ADMIN — INSULIN HUMAN 2 UNITS: 100 INJECTION, SOLUTION PARENTERAL at 05:33

## 2019-01-22 RX ADMIN — POTASSIUM CHLORIDE 40 MEQ: 1500 TABLET, EXTENDED RELEASE ORAL at 11:59

## 2019-01-22 RX ADMIN — INSULIN HUMAN 20 UNITS: 100 INJECTION, SUSPENSION SUBCUTANEOUS at 05:33

## 2019-01-22 RX ADMIN — ERYTHROMYCIN 250 MG: 250 TABLET, DELAYED RELEASE ORAL at 05:31

## 2019-01-22 RX ADMIN — PANTOPRAZOLE SODIUM 40 MG: 40 INJECTION, POWDER, LYOPHILIZED, FOR SOLUTION INTRAVENOUS at 07:30

## 2019-01-22 RX ADMIN — OXYCODONE HYDROCHLORIDE 5 MG: 5 TABLET ORAL at 23:00

## 2019-01-22 RX ADMIN — ONDANSETRON HYDROCHLORIDE 4 MG: 2 INJECTION, SOLUTION INTRAMUSCULAR; INTRAVENOUS at 19:35

## 2019-01-22 RX ADMIN — INSULIN HUMAN 20 UNITS: 100 INJECTION, SUSPENSION SUBCUTANEOUS at 17:44

## 2019-01-22 RX ADMIN — OXYCODONE HYDROCHLORIDE 5 MG: 5 TABLET ORAL at 19:35

## 2019-01-22 RX ADMIN — ERYTHROMYCIN 250 MG: 250 TABLET, DELAYED RELEASE ORAL at 11:59

## 2019-01-22 ASSESSMENT — PAIN SCALES - GENERAL
PAINLEVEL_OUTOF10: 7
PAINLEVEL_OUTOF10: 4
PAINLEVEL_OUTOF10: 4
PAINLEVEL_OUTOF10: 8
PAINLEVEL_OUTOF10: 0
PAINLEVEL_OUTOF10: 7
PAINLEVEL_OUTOF10: 0
PAINLEVEL_OUTOF10: 7

## 2019-01-22 ASSESSMENT — ENCOUNTER SYMPTOMS
BACK PAIN: 1
NAUSEA: 1
FOCAL WEAKNESS: 0
SPUTUM PRODUCTION: 0
FALLS: 0
STRIDOR: 0
COUGH: 0
SHORTNESS OF BREATH: 0
DIARRHEA: 0
WEAKNESS: 0
FEVER: 0
HEMOPTYSIS: 0
EYE REDNESS: 0
SEIZURES: 0
EYE DISCHARGE: 0
BLURRED VISION: 0
EYE PAIN: 0
FLANK PAIN: 0
PALPITATIONS: 0
VOMITING: 0
MEMORY LOSS: 0
NECK PAIN: 0
HEADACHES: 0
ORTHOPNEA: 0
NERVOUS/ANXIOUS: 0
BLOOD IN STOOL: 0
ABDOMINAL PAIN: 0
SPEECH CHANGE: 0
LOSS OF CONSCIOUSNESS: 0
HALLUCINATIONS: 0

## 2019-01-22 ASSESSMENT — LIFESTYLE VARIABLES: SUBSTANCE_ABUSE: 0

## 2019-01-22 NOTE — PROGRESS NOTES
Rechecked pt FSBS twice. Result is 56. Per hypoglycemia protocol and pt preference, pt given 4oz of juice to drink. Will re-check FSBS in 15 minutes.

## 2019-01-22 NOTE — CARE PLAN
Problem: Communication  Goal: The ability to communicate needs accurately and effectively will improve  Outcome: PROGRESSING SLOWER THAN EXPECTED  Pt notifying staff that they are tired and want to nap when attempting to ambulate with pt or bathe. Staff coordinating with pt to allow naps in between and after ambulation and self-care. Will continue to encourage pt to ambulate and move about with staff.      Problem: Infection  Goal: Will remain free from infection  Outcome: MET Date Met: 01/22/19  Pt negative for flu. Pt has remained free from infection since admission. Will continue to monitor and assess.     Problem: Bowel/Gastric:  Goal: Normal bowel function is maintained or improved  Outcome: NOT MET  Pt with chronic gastroparesis. Pt has not had bowel movement since prior to arrival. Hypoactive bowel sounds. Pt resumed to home meds for gastroparesis. Will continue to monitor and assess.

## 2019-01-22 NOTE — PROGRESS NOTES
Pt with stable FSBS between 100-150 for >4 hours. 1700 labs show CO2 of 18 and anion gap of 7. Per protocol, Dr. Ocampo notified to transition to sub Q insulin. Per Dr. Ocampo, stop insulin drip and continue D10 and .45% NS for 2 hours. Advance diet as tolerated. Continue to treat pt's nausea and vomiting.

## 2019-01-22 NOTE — CARE PLAN
Problem: Safety  Goal: Will remain free from falls  Outcome: PROGRESSING AS EXPECTED  Bed alarm set, hourly rounds on patient, call bell in reach.    Problem: Fluid Volume:  Goal: Will maintain balanced intake and output  Outcome: PROGRESSING AS EXPECTED

## 2019-01-22 NOTE — PROGRESS NOTES
[facility] Medicine Daily Progress Note    Date of Service  1/22/2019    Chief Complaint  28 y.o. female admitted 1/21/2019 with N/V    Hospital Course          Interval Problem Update    vomited x2 last night  SR 80-90  Tmax 99.3  Tolerated breakfeast  LR 75          Consultants/Specialty  Pulmonology    Code Status  full    Disposition  TBD    Review of Systems  Review of Systems   Constitutional: Positive for malaise/fatigue. Negative for fever.   HENT: Negative for congestion, ear discharge and nosebleeds.    Eyes: Negative for blurred vision, pain, discharge and redness.   Respiratory: Negative for cough, hemoptysis, sputum production, shortness of breath and stridor.    Cardiovascular: Negative for chest pain, palpitations, orthopnea and leg swelling.   Gastrointestinal: Positive for nausea. Negative for abdominal pain, blood in stool, diarrhea, melena and vomiting.   Genitourinary: Negative for dysuria, flank pain and hematuria.   Musculoskeletal: Positive for back pain. Negative for falls, joint pain and neck pain.   Skin: Negative.    Neurological: Negative for speech change, focal weakness, seizures, loss of consciousness, weakness and headaches.   Psychiatric/Behavioral: Negative for hallucinations, memory loss and substance abuse. The patient is not nervous/anxious.    All other systems reviewed and are negative.       Physical Exam  Temp:  [36.2 °C (97.2 °F)-37.4 °C (99.3 °F)] 36.2 °C (97.2 °F)  Pulse:  [] 92  Resp:  [2-31] 17  SpO2:  [95 %-99 %] 97 %    Physical Exam   Constitutional: She is oriented to person, place, and time. She appears well-developed and well-nourished.   HENT:   Head: Normocephalic and atraumatic.   Right Ear: External ear normal.   Left Ear: External ear normal.   Mouth/Throat: No oropharyngeal exudate.   Eyes: Conjunctivae are normal. Right eye exhibits no discharge. Left eye exhibits no discharge. No scleral icterus.   Neck: Neck supple. No JVD present. No tracheal  deviation present.   Cardiovascular: Normal rate and regular rhythm.  Exam reveals no gallop and no friction rub.    No murmur heard.  Pulmonary/Chest: Effort normal and breath sounds normal. No stridor. No respiratory distress. She has no wheezes. She has no rales. She exhibits no tenderness.   Abdominal: Soft. Bowel sounds are normal. She exhibits no distension and no mass. There is no tenderness. There is no rebound and no guarding.   Musculoskeletal: She exhibits no edema or tenderness.   Neurological: She is alert and oriented to person, place, and time. No cranial nerve deficit. She exhibits normal muscle tone.   Skin: Skin is warm and dry. She is not diaphoretic. No cyanosis. Nails show no clubbing.   Psychiatric: She has a normal mood and affect. Her behavior is normal. Thought content normal.   Nursing note and vitals reviewed.      Fluids    Intake/Output Summary (Last 24 hours) at 01/22/19 0918  Last data filed at 01/22/19 0800   Gross per 24 hour   Intake          2438.17 ml   Output              325 ml   Net          2113.17 ml       Laboratory  Recent Labs      01/21/19   0050  01/21/19   0500  01/21/19   0915  01/22/19   0524  01/22/19   0900   WBC  10.3  11.8*   --   6.9   --    RBC  5.41*  4.05*   --   3.86*   --    HEMOGLOBIN  15.5  11.7*  11.8*  11.0*  10.9*   HEMATOCRIT  46.8  34.5*   --   33.5*   --    MCV  86.5  86.4   --   86.8   --    MCH  28.7  29.1   --   28.5   --    MCHC  33.1*  33.7   --   32.8*   --    RDW  40.4  40.6   --   41.7   --    PLATELETCT  318  220   --   195   --    MPV  10.5  10.2   --   9.6   --      Recent Labs      01/21/19   1327  01/21/19   1720  01/22/19   0524   SODIUM  137  138  135   POTASSIUM  4.0  3.7  3.7   CHLORIDE  112  113*  110   CO2  15*  18*  20   GLUCOSE  116*  124*  171*   BUN  13  12  12   CREATININE  0.58  0.52  0.55   CALCIUM  8.9  8.6  8.8                   Imaging  DX-CHEST-LIMITED (1 VIEW)   Final Result      No evidence of pneumothorax following  placement of LEFT neck catheter           Assessment/Plan  * DKA (diabetic ketoacidoses) ([facility])   Assessment & Plan    DKA resolved  Likely secondary to her gastroparesis patient reports being compliant with her insulin and no other triggers identified  Continue NPH and Humulin  Monitor CBGs and adjust accordingly     Coffee ground emesis   Assessment & Plan    Resolved  Transition to Prilosec     Hypokalemia- (present on admission)   Assessment & Plan    Replete and monitor      Gastroparesis due to DM ([facility])- (present on admission)   Assessment & Plan    Resume home dose of Reglan  PRN Compazine     GERD (gastroesophageal reflux disease)- (present on admission)   Assessment & Plan    Resume Prilosec     Diabetes type I (CMS-H[facility])- (present on admission)   Assessment & Plan    Continue NPH and Humulin and monitor CBGs and adjust accordingly          VTE prophylaxis: ambulatory

## 2019-01-23 ENCOUNTER — PATIENT OUTREACH (OUTPATIENT)
Dept: HEALTH INFORMATION MANAGEMENT | Facility: OTHER | Age: 29
End: 2019-01-23

## 2019-01-23 VITALS
DIASTOLIC BLOOD PRESSURE: 62 MMHG | BODY MASS INDEX: 29.2 KG/M2 | TEMPERATURE: 98.2 F | SYSTOLIC BLOOD PRESSURE: 120 MMHG | HEART RATE: 96 BPM | OXYGEN SATURATION: 96 % | WEIGHT: 170.19 LBS | RESPIRATION RATE: 16 BRPM

## 2019-01-23 PROBLEM — K92.0 COFFEE GROUND EMESIS: Status: RESOLVED | Noted: 2019-01-21 | Resolved: 2019-01-23

## 2019-01-23 PROBLEM — E11.10 DKA (DIABETIC KETOACIDOSES): Status: RESOLVED | Noted: 2019-01-21 | Resolved: 2019-01-23

## 2019-01-23 LAB
ANION GAP SERPL CALC-SCNC: 8 MMOL/L (ref 0–11.9)
BASOPHILS # BLD AUTO: 0.5 % (ref 0–1.8)
BASOPHILS # BLD: 0.03 K/UL (ref 0–0.12)
BUN SERPL-MCNC: 8 MG/DL (ref 8–22)
CALCIUM SERPL-MCNC: 8.7 MG/DL (ref 8.5–10.5)
CHLORIDE SERPL-SCNC: 109 MMOL/L (ref 96–112)
CO2 SERPL-SCNC: 22 MMOL/L (ref 20–33)
COMMENT 1642: NORMAL
CREAT SERPL-MCNC: 0.52 MG/DL (ref 0.5–1.4)
EOSINOPHIL # BLD AUTO: 0.13 K/UL (ref 0–0.51)
EOSINOPHIL NFR BLD: 2.3 % (ref 0–6.9)
ERYTHROCYTE [DISTWIDTH] IN BLOOD BY AUTOMATED COUNT: 39.6 FL (ref 35.9–50)
GLUCOSE BLD-MCNC: 139 MG/DL (ref 65–99)
GLUCOSE BLD-MCNC: 369 MG/DL (ref 65–99)
GLUCOSE BLD-MCNC: 80 MG/DL (ref 65–99)
GLUCOSE SERPL-MCNC: 135 MG/DL (ref 65–99)
HCT VFR BLD AUTO: 34.7 % (ref 37–47)
HGB BLD-MCNC: 12 G/DL (ref 12–16)
IMM GRANULOCYTES # BLD AUTO: 0.05 K/UL (ref 0–0.11)
IMM GRANULOCYTES NFR BLD AUTO: 0.9 % (ref 0–0.9)
LYMPHOCYTES # BLD AUTO: 3.02 K/UL (ref 1–4.8)
LYMPHOCYTES NFR BLD: 53.8 % (ref 22–41)
MAGNESIUM SERPL-MCNC: 1.7 MG/DL (ref 1.5–2.5)
MCH RBC QN AUTO: 29.1 PG (ref 27–33)
MCHC RBC AUTO-ENTMCNC: 34.6 G/DL (ref 33.6–35)
MCV RBC AUTO: 84 FL (ref 81.4–97.8)
MONOCYTES # BLD AUTO: 0.56 K/UL (ref 0–0.85)
MONOCYTES NFR BLD AUTO: 10 % (ref 0–13.4)
MORPHOLOGY BLD-IMP: NORMAL
NEUTROPHILS # BLD AUTO: 1.82 K/UL (ref 2–7.15)
NEUTROPHILS NFR BLD: 32.5 % (ref 44–72)
NRBC # BLD AUTO: 0 K/UL
NRBC BLD-RTO: 0 /100 WBC
PLATELET # BLD AUTO: ABNORMAL K/UL (ref 164–446)
PLATELET BLD QL SMEAR: NORMAL
PMV BLD AUTO: 11.5 FL (ref 9–12.9)
POTASSIUM SERPL-SCNC: 3.4 MMOL/L (ref 3.6–5.5)
RBC # BLD AUTO: 4.13 M/UL (ref 4.2–5.4)
RBC BLD AUTO: PRESENT
SODIUM SERPL-SCNC: 139 MMOL/L (ref 135–145)
VARIANT LYMPHS BLD QL SMEAR: NORMAL
WBC # BLD AUTO: 5.6 K/UL (ref 4.8–10.8)

## 2019-01-23 PROCEDURE — 82962 GLUCOSE BLOOD TEST: CPT

## 2019-01-23 PROCEDURE — 99239 HOSP IP/OBS DSCHRG MGMT >30: CPT | Performed by: INTERNAL MEDICINE

## 2019-01-23 PROCEDURE — A9270 NON-COVERED ITEM OR SERVICE: HCPCS | Performed by: HOSPITALIST

## 2019-01-23 PROCEDURE — 700102 HCHG RX REV CODE 250 W/ 637 OVERRIDE(OP): Performed by: HOSPITALIST

## 2019-01-23 PROCEDURE — 85025 COMPLETE CBC W/AUTO DIFF WBC: CPT

## 2019-01-23 PROCEDURE — 80048 BASIC METABOLIC PNL TOTAL CA: CPT

## 2019-01-23 PROCEDURE — 36415 COLL VENOUS BLD VENIPUNCTURE: CPT

## 2019-01-23 PROCEDURE — 83735 ASSAY OF MAGNESIUM: CPT

## 2019-01-23 PROCEDURE — 700102 HCHG RX REV CODE 250 W/ 637 OVERRIDE(OP): Performed by: NURSE PRACTITIONER

## 2019-01-23 PROCEDURE — A9270 NON-COVERED ITEM OR SERVICE: HCPCS | Performed by: NURSE PRACTITIONER

## 2019-01-23 RX ORDER — POTASSIUM CHLORIDE 20 MEQ/1
40 TABLET, EXTENDED RELEASE ORAL ONCE
Status: COMPLETED | OUTPATIENT
Start: 2019-01-23 | End: 2019-01-23

## 2019-01-23 RX ORDER — INSULIN GLARGINE 100 [IU]/ML
30 INJECTION, SOLUTION SUBCUTANEOUS 2 TIMES DAILY
Qty: 1 PEN | Refills: 11 | Status: ON HOLD | OUTPATIENT
Start: 2019-01-23 | End: 2019-03-26

## 2019-01-23 RX ADMIN — ATORVASTATIN CALCIUM 20 MG: 20 TABLET, FILM COATED ORAL at 05:59

## 2019-01-23 RX ADMIN — ERYTHROMYCIN 250 MG: 250 TABLET, DELAYED RELEASE ORAL at 07:44

## 2019-01-23 RX ADMIN — OXYCODONE HYDROCHLORIDE 5 MG: 5 TABLET ORAL at 07:43

## 2019-01-23 RX ADMIN — OMEPRAZOLE 20 MG: 20 CAPSULE, DELAYED RELEASE ORAL at 05:58

## 2019-01-23 RX ADMIN — SENNOSIDES AND DOCUSATE SODIUM 2 TABLET: 8.6; 5 TABLET ORAL at 17:15

## 2019-01-23 RX ADMIN — METOCLOPRAMIDE HYDROCHLORIDE 10 MG: 5 SOLUTION ORAL at 17:15

## 2019-01-23 RX ADMIN — SENNOSIDES AND DOCUSATE SODIUM 2 TABLET: 8.6; 5 TABLET ORAL at 05:58

## 2019-01-23 RX ADMIN — INSULIN HUMAN 20 UNITS: 100 INJECTION, SUSPENSION SUBCUTANEOUS at 08:37

## 2019-01-23 RX ADMIN — ERYTHROMYCIN 250 MG: 250 TABLET, DELAYED RELEASE ORAL at 17:15

## 2019-01-23 RX ADMIN — INSULIN HUMAN 20 UNITS: 100 INJECTION, SUSPENSION SUBCUTANEOUS at 17:17

## 2019-01-23 RX ADMIN — ERYTHROMYCIN 250 MG: 250 TABLET, DELAYED RELEASE ORAL at 12:06

## 2019-01-23 RX ADMIN — INSULIN HUMAN 8 UNITS: 100 INJECTION, SOLUTION PARENTERAL at 12:09

## 2019-01-23 RX ADMIN — POTASSIUM CHLORIDE 40 MEQ: 1500 TABLET, EXTENDED RELEASE ORAL at 12:06

## 2019-01-23 RX ADMIN — METOCLOPRAMIDE HYDROCHLORIDE 10 MG: 5 SOLUTION ORAL at 12:06

## 2019-01-23 RX ADMIN — METOCLOPRAMIDE HYDROCHLORIDE 10 MG: 5 SOLUTION ORAL at 07:44

## 2019-01-23 ASSESSMENT — COGNITIVE AND FUNCTIONAL STATUS - GENERAL
SUGGESTED CMS G CODE MODIFIER MOBILITY: CH
DAILY ACTIVITIY SCORE: 24
SUGGESTED CMS G CODE MODIFIER DAILY ACTIVITY: CH
MOBILITY SCORE: 24

## 2019-01-23 ASSESSMENT — PAIN SCALES - GENERAL: PAINLEVEL_OUTOF10: 8

## 2019-01-23 NOTE — PROGRESS NOTES
Two RN skin check done at bedside on admission. Pt has scratches on BLE. Pt reports scratches from dogs at home. B/L heels dry and flaky. All other skin intact at this time.

## 2019-01-23 NOTE — PROGRESS NOTES
Assumed care of pt. A/Ox4, able to make needs known. PIV in left upper arm 20g, saline locked and flushing well. Diabetic diet, accu checks ACHS. Up self, steady gait. POC home today. Call light in reach, bed in low position, will continue hourly rounding.

## 2019-01-23 NOTE — CARE PLAN
Problem: Safety  Goal: Will remain free from injury  Outcome: PROGRESSING AS EXPECTED  Fall and safety precautions in place.    Problem: Pain Management  Goal: Pain level will decrease to patient's comfort goal  Outcome: PROGRESSING AS EXPECTED  Pt medicated as needed for pain. Upon reassessment pt sleeping. Will continue to monitor.

## 2019-01-23 NOTE — CARE PLAN
Problem: Communication  Goal: The ability to communicate needs accurately and effectively will improve    Intervention: [place] patient and significant other/support system to call light to alert staff of needs  Able to make needs known, instructed to call for assistance       Problem: Safety  Goal: Will remain free from injury    Intervention: Provide assistance with mobility  Up self, steady gait

## 2019-01-23 NOTE — DISCHARGE INSTRUCTIONS
Discharge Instructions    Discharged to home by car with relative. Discharged via walking, hospital escort: Refused.  Special equipment needed: Not Applicable    Be sure to schedule a follow-up appointment with your primary care doctor or any specialists as instructed.     Discharge Plan:   Influenza Vaccine Indication: Patient Refuses    I understand that a diet low in cholesterol, fat, and sodium is recommended for good health. Unless I have been given specific instructions below for another diet, I accept this instruction as my diet prescription.   Other diet: diabetic diet      Type 1 Diabetes Mellitus, Self Care, Adult  When you have type 1 diabetes (type 1 diabetes mellitus), you must keep your blood sugar (glucose) under control. You can do this with:  · Insulin.  · Nutrition.  · Exercise.  · Lifestyle changes.  · Other medicines, if needed.  · Support from your doctors and others.  How do I manage my blood sugar?  · Check your blood sugar every day, as often as told.  · Call your doctor if your blood sugar is above your goal numbers for 2 tests in a row.  · Have your A1c (hemoglobin A1c) level checked at least twice a year. Have it checked more often if your doctor tells you to.  Your doctor will set treatment goals for you. Generally, you should have these blood sugar levels:  · Before meals (preprandial):  mg/dL (4.4-7.2 mmol/L).  · After meals (postprandial): below 180 mg/dL (10 mmol/L).  · A1c level: less than 7%.  What do I need to know about high blood sugar?  High blood sugar is called hyperglycemia. Know the signs of high blood sugar. Signs may include:  · Feeling:  ¨ Thirsty.  ¨ Hungry.  ¨ Very tired.  · Needing to pee (urinate) more than usual.  · Blurry vision.  What do I need to know about low blood sugar?  Low blood sugar is called hypoglycemia. This is when blood sugar is at or below 70 mg/dL (3.9 mmol/L). Symptoms may include:  · Feeling:  ¨ Hungry.  ¨ Worried or nervous  (anxious).  ¨ Sweaty and clammy.  ¨ Confused.  ¨ Dizzy.  ¨ Sleepy.  ¨ Sick to your stomach (nauseous).  · Having:  ¨ A fast heartbeat.  ¨ A headache.  ¨ A change in your vision.  ¨ Jerky movements that you cannot control (seizure).  ¨ Nightmares.  ¨ Tingling or no feeling (numbness) around the mouth, lips, or tongue.  · Having trouble with:  ¨ Talking.  ¨ Paying attention (concentrating).  ¨ Moving (coordination).  ¨ Sleeping.  · Shaking.  · Passing out (fainting).  · Getting upset easily (irritability).  Treating low blood sugar   To treat low blood sugar, eat or drink something sugary right away. If you can think clearly and swallow safely, follow the 15:15 rule:  · Take 15 grams of a fast-acting carb (carbohydrate). Some fast-acting carbs are:  ¨ 1 tube of glucose gel.  ¨ 3 sugar tablets (glucose pills).  ¨ 6-8 pieces of hard candy.  ¨ 4 oz (120 mL) of fruit juice.  ¨ 4 oz (120 mL) regular (not diet) soda.  · Check your blood sugar 15 minutes after you take the carb.  · If your blood sugar is still at or below 70 mg/dL (3.9 mmol/L), take 15 grams of a carb again.  · If your blood sugar does not go above 70 mg/dL (3.9 mmol/L) after 3 tries, get help right away.  · After your blood sugar goes back to normal, eat a meal or a snack within 1 hour.  Treating very low blood sugar   If your blood sugar is at or below 54 mg/dL (3 mmol/L), you have very low blood sugar (severe hypoglycemia). This is an emergency. Do not wait to see if the symptoms will go away. Get medical help right away. Call your local emergency services (911 in the [place] Do not drive yourself to the hospital.  If you have very low blood sugar and you cannot eat or drink, you may need a glucagon shot (injection). A family member or friend should learn how to check your blood sugar and how to give you a glucagon shot. Ask your doctor if you need to have a glucagon shot kit at home.  What else is important to manage my diabetes?  Medicine  · Take insulin  and diabetes medicines as told.  · Adjust your insulin and medicines as told.  · Do not run out of insulin or medicines.  Having diabetes can put you at risk for other long-term (chronic) conditions. These may include heart disease and kidney disease. Your doctor may prescribe medicines to help prevent problems from diabetes.  Food  · Make healthy food choices. These include:  ¨ Chicken, fish, egg whites, and beans.  ¨ Oats, whole wheat, bulgur, brown rice, quinoa, and millet.  ¨ Fresh fruits and vegetables.  ¨ Low-fat dairy products.  ¨ Nuts, avocado, olive oil, and canola oil.  · Meet with a  (registered dietitian). He or she can help you make an eating plan that is right for you.  · Follow instructions from your doctor about what you cannot eat or drink.  · Drink enough fluid to keep your pee (urine) clear or pale yellow.  · Eat healthy snacks between healthy meals.  · Keep track of carbs that you eat. Do this by reading food labels and learning food serving sizes.  · Follow your sick day plan when you cannot eat or drink normally. Make this plan with your doctor so it is ready to use.  Activity   · Exercise at least 3 times a week.  · Do not go more than 2 days without exercising.  · Talk with your doctor before you start a new exercise. Your doctor may need to adjust your insulin, medicines, or food.  Lifestyle   · Do not use any tobacco products. These include cigarettes, chewing tobacco, and e-cigarettes. If you need help quitting, ask your doctor.  · Ask your doctor how much alcohol is safe for you.  · Learn to deal with stress. If you need help with this, ask your doctor.  Body care  · Stay up to date with your shots (immunizations).  · Have your eyes and feet checked by a doctor as often as told.  · Check your skin and feet every day. Check for cuts, bruises, redness, blisters, or sores.  · Brush your teeth and gums two times a day, and floss at least one time a day.  · Go to the dentist  least one time every 6 months.  · Stay at a healthy weight.  General instructions   · Take over-the-counter and prescription medicines only as told by your doctor.  · Share your diabetes care plan with:  ¨ Your work or school.  ¨ People you live with.  · Check your pee (urine) for ketones:  ¨ When you are sick.  ¨ As told by your doctor.  · Carry a card or wear jewelry that says that you have diabetes.  · Ask your doctor:  ¨ Do I need to meet with a diabetes educator?  ¨ Where can I find a support group for people with diabetes?  · Keep all follow-up visits as told by your doctor. This is important.  Where to find more information:  To learn more about diabetes, visit:  · American Diabetes Association: www.diabetes.org  · American Association of Diabetes Educators: www.diabeteseducator.org/patient-resources  This information is not intended to replace advice given to you by your health care provider. Make sure you discuss any questions you have with your health care provider.  Document Released: 04/10/2017 Document Revised: 05/25/2017 Document Reviewed: 01/20/2017  [employer] Interactive Patient Education © 2017 [employer]    Gastroparesis  Introduction  Gastroparesis, also called delayed gastric emptying, is a condition in which food takes longer than normal to empty from the stomach. The condition is usually long-lasting (chronic).  What are the causes?  This condition may be caused by:  · An endocrine disorder, such as hypothyroidism or diabetes. Diabetes is the most common cause of this condition.  · A nervous system disease, such as Parkinson disease or multiple sclerosis.  · Cancer, infection, or surgery of the stomach or vagus nerve.  · A connective tissue disorder, such as scleroderma.  · Certain medicines.  In most cases, the cause is not known.  What increases the risk?  This condition is more likely to develop in:  · People with certain disorders, including endocrine disorders, eating disorders,  amyloidosis, and scleroderma.  · People with certain diseases, including Parkinson disease or multiple sclerosis.  · People with cancer or infection of the stomach or vagus nerve.  · People who have had surgery on the stomach or vagus nerve.  · People who take certain medicines.  · Women.  What are the signs or symptoms?  Symptoms of this condition include:  · An early feeling of fullness when eating.  · Nausea.  · Weight loss.  · Vomiting.  · Heartburn.  · Abdominal bloating.  · Inconsistent blood glucose levels.  · Lack of appetite.  · Acid from the stomach coming up into the esophagus (gastroesophageal reflux).  · Spasms of the stomach.  Symptoms may come and go.  How is this diagnosed?  This condition is diagnosed with tests, such as:  · Tests that check how long it takes food to move through the stomach and intestines. These tests include:  ¨ Upper gastrointestinal (GI) series. In this test, X-rays of the intestines are taken after you drink a liquid. The liquid makes the intestines show up better on the X-rays.  ¨ Gastric emptying scintigraphy. In this test, scans are taken after you eat food that contains a small amount of radioactive material.  ¨ Wireless capsule GI monitoring system. This test involves swallowing a capsule that records information about movement through the stomach.  · Gastric manometry. This test measures electrical and muscular activity in the stomach. It is done with a thin tube that is passed down the throat and into the stomach.  · Endoscopy. This test checks for abnormalities in the lining of the stomach. It is done with a long, thin tube that is passed down the throat and into the stomach.  · An ultrasound. This test can help rule out gallbladder disease or pancreatitis as a cause of your symptoms. It uses sound waves to take pictures of the inside of your body.  How is this treated?  There is no cure for gastroparesis. This condition may be managed with:  · Treatment of the  underlying condition causing the gastroparesis.  · Lifestyle changes, including exercise and dietary changes. Dietary changes can include:  ¨ Changes in what and when you eat.  ¨ Eating smaller meals more often.  ¨ Eating low-fat foods.  ¨ Eating low-fiber forms of high-fiber foods, such as cooked vegetables instead of raw vegetables.  ¨ Having liquid foods in place of solid foods. Liquid foods are easier to digest.  · Medicines. These may be given to control nausea and vomiting and to stimulate stomach muscles.  · Getting food through a feeding tube. This may be done in severe cases.  · A gastric neurostimulator. This is a device that is inserted into the body with surgery. It helps improve stomach emptying and control nausea and vomiting.  Follow these instructions at home:  · Follow your health care provider's instructions about exercise and diet.  · Take medicines only as directed by your health care provider.  Contact a health care provider if:  · Your symptoms do not improve with treatment.  · You have new symptoms.  Get help right away if:  · You have severe abdominal pain that does not improve with treatment.  · You have nausea that does not go away.  · You cannot keep fluids down.  This information is not intended to replace advice given to you by your health care provider. Make sure you discuss any questions you have with your health care provider.  Document Released: 12/18/2006 Document Revised: 05/25/2017 Document Reviewed: 12/14/2015  © 2017 [employer]        Special Instructions: None    · Is patient discharged on Warfarin / Coumadin?   No     Depression / Suicide Risk    As you are discharged from this [facility] facility, it is important to learn how to keep safe from harming yourself.    Recognize the warning signs:  · Abrupt changes in personality, positive or negative- including increase in energy   · Giving away possessions  · Change in eating patterns- significant weight changes-  positive or  negative  · Change in sleeping patterns- unable to sleep or sleeping all the time   · Unwillingness or inability to communicate  · Depression  · Unusual sadness, discouragement and loneliness  · Talk of wanting to die  · Neglect of personal appearance   · Rebelliousness- reckless behavior  · Withdrawal from people/activities they love  · Confusion- inability to concentrate     If you or a loved one observes any of these behaviors or has concerns about self-harm, here's what you can do:  · Talk about it- your feelings and reasons for harming yourself  · Remove any means that you might use to hurt yourself (examples: pills, rope, extension cords, firearm)  · Get professional help from the community (Mental Health, Substance Abuse, psychological counseling)  · Do not be alone:Call your Safe Contact- someone whom you trust who will be there for you.  · Call your local CRISIS HOTLINE 998-3719 or 645-771-8371  · Call your local Children's Mobile Crisis Response Team [place] (515) 251-5097 or www.[employer]  · Call the toll free National Suicide Prevention Hotlines   · National Suicide Prevention Lifeline 662-422-OSPE (9582)  · National Hope Line Network 800-SUICIDE (196-7211)

## 2019-01-23 NOTE — DISCHARGE SUMMARY
Discharge Summary    CHIEF COMPLAINT ON ADMISSION  Chief Complaint   Patient presents with   • N/V   • Hyperglycemia   • Abdominal Pain   • Flank Pain       Reason for Admission  Blood Sugar Abnormality      Admission Date  1/21/2019  Discharge Date  01/23/19    CODE STATUS  Full Code    HPI & HOSPITAL COURSE  This is a 28 y.o. female with PMH DM 1, previous hospitalizations for DKA, and gastroparesis here with dental pain, nausea, vomiting, and hyperglycemia.  She reported recently having URI symptoms.  Day of presentation she reports blood sugars in the 400s at home associated with nausea, vomiting, abdominal pain and flank pain similar to her previous DKA presentations.  She reported compliance with her insulin regime.  Her initial glucose was 564 with gap 25.  She was admitted to ICU for IV fluids, electrolyte replacement, and close glucose monitoring.  She transferred out of ICU once her gap was closed and remained on the medical floor until discharge.  She reports she as at her baseline level of functioning with her chronic gastroparesis. Currently she is tolerating a diet with manageable/minimal abdominal pain and nausea without vomiting and is eager for discharge this evening.    Therefore, she is discharged in good and stable condition to home with close outpatient follow-up.    The patient met 2-midnight criteria for an inpatient stay at the time of discharge.    FOLLOW UP ITEMS POST DISCHARGE  HYPOGLYCEMIA AWARENESS:   Remind yourself of your red flags for low blood sugar. Everyone is different, but here are some common ones:  • Headache  • Hunger  • Shaking  • Sweating  • Feeling tired or weak  • Grouchy mood, getting irritated easily  Know the signs and symptoms of hypoglycemia so you can get help fast if you see them in someone else. Those signs include:  • Fuzzy thinking  • Inability to communicate clearly  • Acting intoxicated  • Sudden crying or aggression  • Seizure or loss of  consciousness      DISCHARGE DIAGNOSES  Principal Problem (Resolved):    DKA (diabetic ketoacidoses) ([facility]) POA: Yes  Active Problems:    Diabetes type I ([facility]) POA: Yes    GERD (gastroesophageal reflux disease) POA: Yes    Gastroparesis due to DM ([facility]) POA: Yes    Hypokalemia POA: Yes    Dyslipidemia POA: Yes  Resolved Problems:    Coffee ground emesis POA: Unknown      FOLLOW UP  Navi Huber M.D.  [address] [place] [id number]  [phone]          MEDICATIONS ON DISCHARGE     Medication List      START taking these medications      Instructions   glucose 4 g 4 g Chew   Take 4 Tabs by mouth as needed for Low Blood Sugar (If FSBG is less than or equal to 70 mg/dL and patient able to eat or drink).  Dose:  16 g     insulin lispro (Human) 100 UNIT/ML Sopn injection  Commonly known as:  ADMELOG SOLOSTAR  Replaces:  insulin glulisine 100 UNIT/ML Sopn injection   Inject 2-32 Units as instructed 3 times a day before meals.  Dose:  2-32 Units        CONTINUE taking these medications      Instructions   atorvastatin 20 MG Tabs  Commonly known as:  LIPITOR   Take 1 Tab by mouth every day.  Dose:  20 mg     BASAGLAR KWIKPEN 100 UNIT/ML Sopn   Inject 30 Units as instructed 2 Times a Day.  Dose:  30 Units     Cholecalciferol 2000 UNIT Caps   Take 2,000 Units by mouth every day.  Dose:  2000 Units     erythromycin base 250 MG Tbec  Commonly known as:  UMU-TAB   Take 1 Tab by mouth 4 Times a Day,Before Meals and at Bedtime.  Dose:  250 mg     metoclopramide 10 MG/10ML Soln  Commonly known as:  REGLAN   Take 10 mg by mouth 3 times a day before meals.  Dose:  10 mg     OMEGA 3 PO   Take 1 Dose by mouth every day. Unknown OTC Strength  Dose:  1 Dose     omeprazole 20 MG delayed-release capsule  Commonly known as:  PRILOSEC   Take 1 Cap by mouth every day.  Dose:  20 mg        STOP taking these medications    insulin glulisine 100 UNIT/ML Sopn injection  Commonly known as:  APIDRA  Replaced by:  insulin lispro  (Human) 100 UNIT/ML Sopn injection            Allergies  Allergies   Allergen Reactions   • Pcn [Penicillins] Shortness of Breath and Swelling     Per patient's Mom, patient tolerates Keflex   • Lisinopril Unspecified     Per historical: Reported pedal swelling. No facial/angioedema or rash nor respiratory symptoms.    • Promethazine Vomiting       DIET  Orders Placed This Encounter   Procedures   • Diet Order Diabetic     Standing Status:   Standing     Number of Occurrences:   1     Order Specific Question:   Diet:     Answer:   Diabetic [3]       ACTIVITY  As tolerated.  Weight bearing as tolerated    CONSULTATIONS  NA    PROCEDURES  NA    LABORATORY  Lab Results   Component Value Date    SODIUM 139 01/23/2019    POTASSIUM 3.4 (L) 01/23/2019    CHLORIDE 109 01/23/2019    CO2 22 01/23/2019    GLUCOSE 135 (H) 01/23/2019    BUN 8 01/23/2019    CREATININE 0.52 01/23/2019        Lab Results   Component Value Date    WBC 5.6 01/23/2019    HEMOGLOBIN 12.0 01/23/2019    HEMATOCRIT 34.7 (L) 01/23/2019    PLATELETCT #SN 01/23/2019        Total time of the discharge process exceeds 39 minutes.  VITO Coppola.

## 2019-01-31 LAB — EKG IMPRESSION: NORMAL

## 2019-03-20 ENCOUNTER — HOSPITAL ENCOUNTER (INPATIENT)
Facility: MEDICAL CENTER | Age: 29
LOS: 6 days | DRG: 638 | End: 2019-03-26
Attending: EMERGENCY MEDICINE | Admitting: HOSPITALIST
Payer: MEDICAID

## 2019-03-20 ENCOUNTER — APPOINTMENT (OUTPATIENT)
Dept: RADIOLOGY | Facility: MEDICAL CENTER | Age: 29
DRG: 638 | End: 2019-03-20
Attending: EMERGENCY MEDICINE
Payer: MEDICAID

## 2019-03-20 DIAGNOSIS — J98.2: ICD-10-CM

## 2019-03-20 DIAGNOSIS — R10.9 FLANK PAIN: ICD-10-CM

## 2019-03-20 DIAGNOSIS — E10.10 DM (DIABETES MELLITUS) TYPE 1, UNCONTROLLED, WITH KETOACIDOSIS (HCC): ICD-10-CM

## 2019-03-20 DIAGNOSIS — E10.10 DIABETIC KETOACIDOSIS WITHOUT COMA ASSOCIATED WITH TYPE 1 DIABETES MELLITUS (HCC): ICD-10-CM

## 2019-03-20 PROBLEM — K22.3 ESOPHAGEAL RUPTURE: Status: ACTIVE | Noted: 2019-03-20

## 2019-03-20 PROBLEM — E11.10 DKA (DIABETIC KETOACIDOSES): Status: ACTIVE | Noted: 2019-03-20

## 2019-03-20 PROBLEM — N17.9 AKI (ACUTE KIDNEY INJURY) (HCC): Status: ACTIVE | Noted: 2019-03-20

## 2019-03-20 LAB
ALBUMIN SERPL BCP-MCNC: 4.3 G/DL (ref 3.2–4.9)
ALBUMIN SERPL BCP-MCNC: 4.8 G/DL (ref 3.2–4.9)
ALBUMIN/GLOB SERPL: 1.7 G/DL
ALBUMIN/GLOB SERPL: 1.9 G/DL
ALP SERPL-CCNC: 115 U/L (ref 30–99)
ALP SERPL-CCNC: 134 U/L (ref 30–99)
ALT SERPL-CCNC: 11 U/L (ref 2–50)
ALT SERPL-CCNC: 11 U/L (ref 2–50)
ANION GAP SERPL CALC-SCNC: 10 MMOL/L (ref 0–11.9)
ANION GAP SERPL CALC-SCNC: 13 MMOL/L (ref 0–11.9)
ANION GAP SERPL CALC-SCNC: 16 MMOL/L (ref 0–11.9)
ANION GAP SERPL CALC-SCNC: 22 MMOL/L (ref 0–11.9)
ANION GAP SERPL CALC-SCNC: 24 MMOL/L (ref 0–11.9)
ANION GAP SERPL CALC-SCNC: 25 MMOL/L (ref 0–11.9)
APPEARANCE UR: CLEAR
AST SERPL-CCNC: 10 U/L (ref 12–45)
AST SERPL-CCNC: 10 U/L (ref 12–45)
B-OH-BUTYR SERPL-MCNC: >8 MMOL/L (ref 0.02–0.27)
BACTERIA #/AREA URNS HPF: NEGATIVE /HPF
BASOPHILS # BLD AUTO: 0.3 % (ref 0–1.8)
BASOPHILS # BLD AUTO: 0.4 % (ref 0–1.8)
BASOPHILS # BLD: 0.06 K/UL (ref 0–0.12)
BASOPHILS # BLD: 0.07 K/UL (ref 0–0.12)
BILIRUB SERPL-MCNC: 0.3 MG/DL (ref 0.1–1.5)
BILIRUB SERPL-MCNC: 0.4 MG/DL (ref 0.1–1.5)
BILIRUB UR QL STRIP.AUTO: NEGATIVE
BUN SERPL-MCNC: 25 MG/DL (ref 8–22)
BUN SERPL-MCNC: 27 MG/DL (ref 8–22)
BUN SERPL-MCNC: 28 MG/DL (ref 8–22)
BUN SERPL-MCNC: 29 MG/DL (ref 8–22)
BUN SERPL-MCNC: 30 MG/DL (ref 8–22)
BUN SERPL-MCNC: 30 MG/DL (ref 8–22)
CALCIUM SERPL-MCNC: 7.6 MG/DL (ref 8.5–10.5)
CALCIUM SERPL-MCNC: 7.7 MG/DL (ref 8.5–10.5)
CALCIUM SERPL-MCNC: 7.7 MG/DL (ref 8.5–10.5)
CALCIUM SERPL-MCNC: 7.9 MG/DL (ref 8.5–10.5)
CALCIUM SERPL-MCNC: 8 MG/DL (ref 8.5–10.5)
CALCIUM SERPL-MCNC: 8.8 MG/DL (ref 8.5–10.5)
CHLORIDE SERPL-SCNC: 110 MMOL/L (ref 96–112)
CHLORIDE SERPL-SCNC: 114 MMOL/L (ref 96–112)
CHLORIDE SERPL-SCNC: 116 MMOL/L (ref 96–112)
CHLORIDE SERPL-SCNC: 120 MMOL/L (ref 96–112)
CHLORIDE SERPL-SCNC: 121 MMOL/L (ref 96–112)
CHLORIDE SERPL-SCNC: 122 MMOL/L (ref 96–112)
CO2 SERPL-SCNC: 13 MMOL/L (ref 20–33)
CO2 SERPL-SCNC: 15 MMOL/L (ref 20–33)
CO2 SERPL-SCNC: 9 MMOL/L (ref 20–33)
CO2 SERPL-SCNC: <5 MMOL/L (ref 20–33)
COLOR UR: YELLOW
CREAT SERPL-MCNC: 1.21 MG/DL (ref 0.5–1.4)
CREAT SERPL-MCNC: 1.23 MG/DL (ref 0.5–1.4)
CREAT SERPL-MCNC: 1.25 MG/DL (ref 0.5–1.4)
CREAT SERPL-MCNC: 1.27 MG/DL (ref 0.5–1.4)
CREAT SERPL-MCNC: 1.36 MG/DL (ref 0.5–1.4)
CREAT SERPL-MCNC: 1.38 MG/DL (ref 0.5–1.4)
EOSINOPHIL # BLD AUTO: 0 K/UL (ref 0–0.51)
EOSINOPHIL # BLD AUTO: 0 K/UL (ref 0–0.51)
EOSINOPHIL NFR BLD: 0 % (ref 0–6.9)
EOSINOPHIL NFR BLD: 0 % (ref 0–6.9)
EPI CELLS #/AREA URNS HPF: NEGATIVE /HPF
ERYTHROCYTE [DISTWIDTH] IN BLOOD BY AUTOMATED COUNT: 43.6 FL (ref 35.9–50)
ERYTHROCYTE [DISTWIDTH] IN BLOOD BY AUTOMATED COUNT: 44.3 FL (ref 35.9–50)
EST. AVERAGE GLUCOSE BLD GHB EST-MCNC: 315 MG/DL
GLOBULIN SER CALC-MCNC: 2.5 G/DL (ref 1.9–3.5)
GLOBULIN SER CALC-MCNC: 2.5 G/DL (ref 1.9–3.5)
GLUCOSE BLD-MCNC: 104 MG/DL (ref 65–99)
GLUCOSE BLD-MCNC: 114 MG/DL (ref 65–99)
GLUCOSE BLD-MCNC: 114 MG/DL (ref 65–99)
GLUCOSE BLD-MCNC: 119 MG/DL (ref 65–99)
GLUCOSE BLD-MCNC: 132 MG/DL (ref 65–99)
GLUCOSE BLD-MCNC: 132 MG/DL (ref 65–99)
GLUCOSE BLD-MCNC: 140 MG/DL (ref 65–99)
GLUCOSE BLD-MCNC: 170 MG/DL (ref 65–99)
GLUCOSE BLD-MCNC: 183 MG/DL (ref 65–99)
GLUCOSE BLD-MCNC: 209 MG/DL (ref 65–99)
GLUCOSE BLD-MCNC: 229 MG/DL (ref 65–99)
GLUCOSE BLD-MCNC: 253 MG/DL (ref 65–99)
GLUCOSE BLD-MCNC: 296 MG/DL (ref 65–99)
GLUCOSE BLD-MCNC: 328 MG/DL (ref 65–99)
GLUCOSE BLD-MCNC: 337 MG/DL (ref 65–99)
GLUCOSE BLD-MCNC: 369 MG/DL (ref 65–99)
GLUCOSE BLD-MCNC: 98 MG/DL (ref 65–99)
GLUCOSE SERPL-MCNC: 125 MG/DL (ref 65–99)
GLUCOSE SERPL-MCNC: 129 MG/DL (ref 65–99)
GLUCOSE SERPL-MCNC: 183 MG/DL (ref 65–99)
GLUCOSE SERPL-MCNC: 332 MG/DL (ref 65–99)
GLUCOSE SERPL-MCNC: 350 MG/DL (ref 65–99)
GLUCOSE SERPL-MCNC: 396 MG/DL (ref 65–99)
GLUCOSE UR STRIP.AUTO-MCNC: >=1000 MG/DL
HBA1C MFR BLD: 12.6 % (ref 0–5.6)
HCG SERPL QL: NEGATIVE
HCT VFR BLD AUTO: 44.3 % (ref 37–47)
HCT VFR BLD AUTO: 48.4 % (ref 37–47)
HGB BLD-MCNC: 14.4 G/DL (ref 12–16)
HGB BLD-MCNC: 16 G/DL (ref 12–16)
HYALINE CASTS #/AREA URNS LPF: ABNORMAL /LPF
IMM GRANULOCYTES # BLD AUTO: 0.18 K/UL (ref 0–0.11)
IMM GRANULOCYTES # BLD AUTO: 0.19 K/UL (ref 0–0.11)
IMM GRANULOCYTES NFR BLD AUTO: 0.8 % (ref 0–0.9)
IMM GRANULOCYTES NFR BLD AUTO: 1 % (ref 0–0.9)
KETONES UR STRIP.AUTO-MCNC: >=160 MG/DL
LACTATE BLD-SCNC: 2 MMOL/L (ref 0.5–2)
LEUKOCYTE ESTERASE UR QL STRIP.AUTO: NEGATIVE
LYMPHOCYTES # BLD AUTO: 1.66 K/UL (ref 1–4.8)
LYMPHOCYTES # BLD AUTO: 2.2 K/UL (ref 1–4.8)
LYMPHOCYTES NFR BLD: 11.8 % (ref 22–41)
LYMPHOCYTES NFR BLD: 7.5 % (ref 22–41)
MAGNESIUM SERPL-MCNC: 1.6 MG/DL (ref 1.5–2.5)
MAGNESIUM SERPL-MCNC: 1.9 MG/DL (ref 1.5–2.5)
MAGNESIUM SERPL-MCNC: 2.1 MG/DL (ref 1.5–2.5)
MCH RBC QN AUTO: 28.1 PG (ref 27–33)
MCH RBC QN AUTO: 28.5 PG (ref 27–33)
MCHC RBC AUTO-ENTMCNC: 32.5 G/DL (ref 33.6–35)
MCHC RBC AUTO-ENTMCNC: 33.1 G/DL (ref 33.6–35)
MCV RBC AUTO: 86.1 FL (ref 81.4–97.8)
MCV RBC AUTO: 86.4 FL (ref 81.4–97.8)
MICRO URNS: ABNORMAL
MONOCYTES # BLD AUTO: 1.29 K/UL (ref 0–0.85)
MONOCYTES # BLD AUTO: 1.42 K/UL (ref 0–0.85)
MONOCYTES NFR BLD AUTO: 6.4 % (ref 0–13.4)
MONOCYTES NFR BLD AUTO: 6.9 % (ref 0–13.4)
NEUTROPHILS # BLD AUTO: 14.97 K/UL (ref 2–7.15)
NEUTROPHILS # BLD AUTO: 18.9 K/UL (ref 2–7.15)
NEUTROPHILS NFR BLD: 79.9 % (ref 44–72)
NEUTROPHILS NFR BLD: 85 % (ref 44–72)
NITRITE UR QL STRIP.AUTO: NEGATIVE
NRBC # BLD AUTO: 0 K/UL
NRBC # BLD AUTO: 0 K/UL
NRBC BLD-RTO: 0 /100 WBC
NRBC BLD-RTO: 0 /100 WBC
PH UR STRIP.AUTO: 5 [PH]
PHOSPHATE SERPL-MCNC: 1.1 MG/DL (ref 2.5–4.5)
PHOSPHATE SERPL-MCNC: 3.8 MG/DL (ref 2.5–4.5)
PHOSPHATE SERPL-MCNC: 5.3 MG/DL (ref 2.5–4.5)
PLATELET # BLD AUTO: 251 K/UL (ref 164–446)
PLATELET # BLD AUTO: 312 K/UL (ref 164–446)
PMV BLD AUTO: 10.2 FL (ref 9–12.9)
PMV BLD AUTO: 10.8 FL (ref 9–12.9)
POTASSIUM SERPL-SCNC: 3 MMOL/L (ref 3.6–5.5)
POTASSIUM SERPL-SCNC: 3.3 MMOL/L (ref 3.6–5.5)
POTASSIUM SERPL-SCNC: 3.4 MMOL/L (ref 3.6–5.5)
POTASSIUM SERPL-SCNC: 4.2 MMOL/L (ref 3.6–5.5)
POTASSIUM SERPL-SCNC: 4.6 MMOL/L (ref 3.6–5.5)
POTASSIUM SERPL-SCNC: 4.7 MMOL/L (ref 3.6–5.5)
PROT SERPL-MCNC: 6.8 G/DL (ref 6–8.2)
PROT SERPL-MCNC: 7.3 G/DL (ref 6–8.2)
PROT UR QL STRIP: 100 MG/DL
RBC # BLD AUTO: 5.13 M/UL (ref 4.2–5.4)
RBC # BLD AUTO: 5.62 M/UL (ref 4.2–5.4)
RBC # URNS HPF: ABNORMAL /HPF
RBC UR QL AUTO: NEGATIVE
SODIUM SERPL-SCNC: 139 MMOL/L (ref 135–145)
SODIUM SERPL-SCNC: 143 MMOL/L (ref 135–145)
SODIUM SERPL-SCNC: 144 MMOL/L (ref 135–145)
SODIUM SERPL-SCNC: 145 MMOL/L (ref 135–145)
SODIUM SERPL-SCNC: 145 MMOL/L (ref 135–145)
SODIUM SERPL-SCNC: 149 MMOL/L (ref 135–145)
SP GR UR STRIP.AUTO: 1.02
UROBILINOGEN UR STRIP.AUTO-MCNC: 0.2 MG/DL
WBC # BLD AUTO: 18.7 K/UL (ref 4.8–10.8)
WBC # BLD AUTO: 22.2 K/UL (ref 4.8–10.8)
WBC #/AREA URNS HPF: ABNORMAL /HPF

## 2019-03-20 PROCEDURE — 71045 X-RAY EXAM CHEST 1 VIEW: CPT

## 2019-03-20 PROCEDURE — 99291 CRITICAL CARE FIRST HOUR: CPT

## 2019-03-20 PROCEDURE — 96368 THER/DIAG CONCURRENT INF: CPT

## 2019-03-20 PROCEDURE — 700105 HCHG RX REV CODE 258: Performed by: INTERNAL MEDICINE

## 2019-03-20 PROCEDURE — 96365 THER/PROPH/DIAG IV INF INIT: CPT

## 2019-03-20 PROCEDURE — 83735 ASSAY OF MAGNESIUM: CPT | Mod: 91

## 2019-03-20 PROCEDURE — 700111 HCHG RX REV CODE 636 W/ 250 OVERRIDE (IP): Performed by: INTERNAL MEDICINE

## 2019-03-20 PROCEDURE — 87040 BLOOD CULTURE FOR BACTERIA: CPT | Mod: 91

## 2019-03-20 PROCEDURE — 83036 HEMOGLOBIN GLYCOSYLATED A1C: CPT

## 2019-03-20 PROCEDURE — 96361 HYDRATE IV INFUSION ADD-ON: CPT

## 2019-03-20 PROCEDURE — 81001 URINALYSIS AUTO W/SCOPE: CPT

## 2019-03-20 PROCEDURE — 700102 HCHG RX REV CODE 250 W/ 637 OVERRIDE(OP): Performed by: EMERGENCY MEDICINE

## 2019-03-20 PROCEDURE — 96367 TX/PROPH/DG ADDL SEQ IV INF: CPT

## 2019-03-20 PROCEDURE — C1751 CATH, INF, PER/CENT/MIDLINE: HCPCS | Performed by: EMERGENCY MEDICINE

## 2019-03-20 PROCEDURE — 99291 CRITICAL CARE FIRST HOUR: CPT | Performed by: INTERNAL MEDICINE

## 2019-03-20 PROCEDURE — 83605 ASSAY OF LACTIC ACID: CPT

## 2019-03-20 PROCEDURE — 80048 BASIC METABOLIC PNL TOTAL CA: CPT

## 2019-03-20 PROCEDURE — 36415 COLL VENOUS BLD VENIPUNCTURE: CPT

## 2019-03-20 PROCEDURE — 84100 ASSAY OF PHOSPHORUS: CPT | Mod: 91

## 2019-03-20 PROCEDURE — C1751 CATH, INF, PER/CENT/MIDLINE: HCPCS

## 2019-03-20 PROCEDURE — 770022 HCHG ROOM/CARE - ICU (200)

## 2019-03-20 PROCEDURE — 84703 CHORIONIC GONADOTROPIN ASSAY: CPT

## 2019-03-20 PROCEDURE — 96366 THER/PROPH/DIAG IV INF ADDON: CPT

## 2019-03-20 PROCEDURE — 700105 HCHG RX REV CODE 258: Performed by: EMERGENCY MEDICINE

## 2019-03-20 PROCEDURE — 700111 HCHG RX REV CODE 636 W/ 250 OVERRIDE (IP): Performed by: HOSPITALIST

## 2019-03-20 PROCEDURE — 85025 COMPLETE CBC W/AUTO DIFF WBC: CPT

## 2019-03-20 PROCEDURE — 74176 CT ABD & PELVIS W/O CONTRAST: CPT

## 2019-03-20 PROCEDURE — 700111 HCHG RX REV CODE 636 W/ 250 OVERRIDE (IP): Performed by: EMERGENCY MEDICINE

## 2019-03-20 PROCEDURE — 700102 HCHG RX REV CODE 250 W/ 637 OVERRIDE(OP): Performed by: HOSPITALIST

## 2019-03-20 PROCEDURE — 82010 KETONE BODYS QUAN: CPT

## 2019-03-20 PROCEDURE — 700101 HCHG RX REV CODE 250: Performed by: HOSPITALIST

## 2019-03-20 PROCEDURE — 700105 HCHG RX REV CODE 258: Performed by: HOSPITALIST

## 2019-03-20 PROCEDURE — 82962 GLUCOSE BLOOD TEST: CPT | Mod: 91

## 2019-03-20 PROCEDURE — 80053 COMPREHEN METABOLIC PANEL: CPT

## 2019-03-20 PROCEDURE — 99223 1ST HOSP IP/OBS HIGH 75: CPT | Performed by: HOSPITALIST

## 2019-03-20 PROCEDURE — A9270 NON-COVERED ITEM OR SERVICE: HCPCS | Performed by: HOSPITALIST

## 2019-03-20 PROCEDURE — 96376 TX/PRO/DX INJ SAME DRUG ADON: CPT

## 2019-03-20 PROCEDURE — 36556 INSERT NON-TUNNEL CV CATH: CPT

## 2019-03-20 PROCEDURE — 96375 TX/PRO/DX INJ NEW DRUG ADDON: CPT

## 2019-03-20 RX ORDER — MORPHINE SULFATE 4 MG/ML
2 INJECTION, SOLUTION INTRAMUSCULAR; INTRAVENOUS
Status: DISCONTINUED | OUTPATIENT
Start: 2019-03-20 | End: 2019-03-26 | Stop reason: HOSPADM

## 2019-03-20 RX ORDER — OXYCODONE HYDROCHLORIDE 5 MG/1
2.5 TABLET ORAL
Status: DISCONTINUED | OUTPATIENT
Start: 2019-03-20 | End: 2019-03-26 | Stop reason: HOSPADM

## 2019-03-20 RX ORDER — DEXTROSE MONOHYDRATE 25 G/50ML
25 INJECTION, SOLUTION INTRAVENOUS
Status: DISCONTINUED | OUTPATIENT
Start: 2019-03-20 | End: 2019-03-21

## 2019-03-20 RX ORDER — PROMETHAZINE HYDROCHLORIDE 25 MG/1
12.5-25 TABLET ORAL EVERY 4 HOURS PRN
Status: DISCONTINUED | OUTPATIENT
Start: 2019-03-20 | End: 2019-03-24

## 2019-03-20 RX ORDER — POTASSIUM CHLORIDE 29.8 MG/ML
40 INJECTION INTRAVENOUS ONCE
Status: DISCONTINUED | OUTPATIENT
Start: 2019-03-20 | End: 2019-03-21

## 2019-03-20 RX ORDER — HYDROMORPHONE HYDROCHLORIDE 1 MG/ML
1 INJECTION, SOLUTION INTRAMUSCULAR; INTRAVENOUS; SUBCUTANEOUS ONCE
Status: COMPLETED | OUTPATIENT
Start: 2019-03-20 | End: 2019-03-20

## 2019-03-20 RX ORDER — POTASSIUM CHLORIDE 7.45 MG/ML
10 INJECTION INTRAVENOUS ONCE
Status: COMPLETED | OUTPATIENT
Start: 2019-03-20 | End: 2019-03-20

## 2019-03-20 RX ORDER — DEXTROSE AND SODIUM CHLORIDE 5; .45 G/100ML; G/100ML
INJECTION, SOLUTION INTRAVENOUS CONTINUOUS
Status: DISCONTINUED | OUTPATIENT
Start: 2019-03-20 | End: 2019-03-20

## 2019-03-20 RX ORDER — CLONIDINE HYDROCHLORIDE 0.1 MG/1
0.1 TABLET ORAL EVERY 6 HOURS PRN
Status: DISCONTINUED | OUTPATIENT
Start: 2019-03-20 | End: 2019-03-24

## 2019-03-20 RX ORDER — SODIUM CHLORIDE, SODIUM LACTATE, POTASSIUM CHLORIDE, AND CALCIUM CHLORIDE .6; .31; .03; .02 G/100ML; G/100ML; G/100ML; G/100ML
2000 INJECTION, SOLUTION INTRAVENOUS ONCE
Status: COMPLETED | OUTPATIENT
Start: 2019-03-20 | End: 2019-03-20

## 2019-03-20 RX ORDER — POTASSIUM CHLORIDE 29.8 MG/ML
40 INJECTION INTRAVENOUS ONCE
Status: COMPLETED | OUTPATIENT
Start: 2019-03-20 | End: 2019-03-20

## 2019-03-20 RX ORDER — ONDANSETRON 2 MG/ML
4 INJECTION INTRAMUSCULAR; INTRAVENOUS ONCE
Status: COMPLETED | OUTPATIENT
Start: 2019-03-20 | End: 2019-03-20

## 2019-03-20 RX ORDER — OXYCODONE HYDROCHLORIDE 5 MG/1
5 TABLET ORAL
Status: DISCONTINUED | OUTPATIENT
Start: 2019-03-20 | End: 2019-03-26 | Stop reason: HOSPADM

## 2019-03-20 RX ORDER — PROMETHAZINE HYDROCHLORIDE 25 MG/1
12.5-25 SUPPOSITORY RECTAL EVERY 4 HOURS PRN
Status: DISCONTINUED | OUTPATIENT
Start: 2019-03-20 | End: 2019-03-24

## 2019-03-20 RX ORDER — AMOXICILLIN 250 MG
2 CAPSULE ORAL 2 TIMES DAILY
Status: DISCONTINUED | OUTPATIENT
Start: 2019-03-20 | End: 2019-03-26 | Stop reason: HOSPADM

## 2019-03-20 RX ORDER — ACETAMINOPHEN 325 MG/1
650 TABLET ORAL EVERY 6 HOURS PRN
Status: DISCONTINUED | OUTPATIENT
Start: 2019-03-20 | End: 2019-03-26 | Stop reason: HOSPADM

## 2019-03-20 RX ORDER — ONDANSETRON 4 MG/1
4 TABLET, ORALLY DISINTEGRATING ORAL EVERY 4 HOURS PRN
Status: DISCONTINUED | OUTPATIENT
Start: 2019-03-20 | End: 2019-03-26 | Stop reason: HOSPADM

## 2019-03-20 RX ORDER — SODIUM CHLORIDE, SODIUM LACTATE, POTASSIUM CHLORIDE, CALCIUM CHLORIDE 600; 310; 30; 20 MG/100ML; MG/100ML; MG/100ML; MG/100ML
INJECTION, SOLUTION INTRAVENOUS CONTINUOUS
Status: DISCONTINUED | OUTPATIENT
Start: 2019-03-20 | End: 2019-03-21

## 2019-03-20 RX ORDER — ONDANSETRON 2 MG/ML
4 INJECTION INTRAMUSCULAR; INTRAVENOUS EVERY 4 HOURS PRN
Status: DISCONTINUED | OUTPATIENT
Start: 2019-03-20 | End: 2019-03-26 | Stop reason: HOSPADM

## 2019-03-20 RX ORDER — MAGNESIUM SULFATE HEPTAHYDRATE 40 MG/ML
4 INJECTION, SOLUTION INTRAVENOUS
Status: COMPLETED | OUTPATIENT
Start: 2019-03-20 | End: 2019-03-20

## 2019-03-20 RX ORDER — SODIUM CHLORIDE 9 MG/ML
2000 INJECTION, SOLUTION INTRAVENOUS ONCE
Status: DISCONTINUED | OUTPATIENT
Start: 2019-03-20 | End: 2019-03-20

## 2019-03-20 RX ORDER — DEXTROSE AND SODIUM CHLORIDE 10; .45 G/100ML; G/100ML
INJECTION, SOLUTION INTRAVENOUS CONTINUOUS
Status: DISCONTINUED | OUTPATIENT
Start: 2019-03-20 | End: 2019-03-21

## 2019-03-20 RX ORDER — BISACODYL 10 MG
10 SUPPOSITORY, RECTAL RECTAL
Status: DISCONTINUED | OUTPATIENT
Start: 2019-03-20 | End: 2019-03-26 | Stop reason: HOSPADM

## 2019-03-20 RX ORDER — DEXTROSE, SODIUM CHLORIDE, SODIUM LACTATE, POTASSIUM CHLORIDE, AND CALCIUM CHLORIDE 5; .6; .31; .03; .02 G/100ML; G/100ML; G/100ML; G/100ML; G/100ML
INJECTION, SOLUTION INTRAVENOUS CONTINUOUS
Status: DISCONTINUED | OUTPATIENT
Start: 2019-03-20 | End: 2019-03-21

## 2019-03-20 RX ORDER — SODIUM CHLORIDE 9 MG/ML
2000 INJECTION, SOLUTION INTRAVENOUS ONCE
Status: COMPLETED | OUTPATIENT
Start: 2019-03-20 | End: 2019-03-20

## 2019-03-20 RX ORDER — MAGNESIUM SULFATE HEPTAHYDRATE 40 MG/ML
2 INJECTION, SOLUTION INTRAVENOUS
Status: COMPLETED | OUTPATIENT
Start: 2019-03-20 | End: 2019-03-20

## 2019-03-20 RX ORDER — POLYETHYLENE GLYCOL 3350 17 G/17G
1 POWDER, FOR SOLUTION ORAL
Status: DISCONTINUED | OUTPATIENT
Start: 2019-03-20 | End: 2019-03-26 | Stop reason: HOSPADM

## 2019-03-20 RX ADMIN — HYDROMORPHONE HYDROCHLORIDE 1 MG: 1 INJECTION, SOLUTION INTRAMUSCULAR; INTRAVENOUS; SUBCUTANEOUS at 05:44

## 2019-03-20 RX ADMIN — ENOXAPARIN SODIUM 40 MG: 100 INJECTION SUBCUTANEOUS at 17:53

## 2019-03-20 RX ADMIN — ONDANSETRON 4 MG: 2 INJECTION INTRAMUSCULAR; INTRAVENOUS at 16:23

## 2019-03-20 RX ADMIN — POTASSIUM PHOSPHATE, MONOBASIC AND POTASSIUM PHOSPHATE, DIBASIC 30 MMOL: 224; 236 INJECTION, SOLUTION INTRAVENOUS at 19:54

## 2019-03-20 RX ADMIN — DEXTROSE AND SODIUM CHLORIDE 1000 ML: 5; 450 INJECTION, SOLUTION INTRAVENOUS at 05:58

## 2019-03-20 RX ADMIN — SODIUM CHLORIDE 2000 ML: 9 INJECTION, SOLUTION INTRAVENOUS at 02:54

## 2019-03-20 RX ADMIN — HYDROMORPHONE HYDROCHLORIDE 1 MG: 1 INJECTION, SOLUTION INTRAMUSCULAR; INTRAVENOUS; SUBCUTANEOUS at 02:55

## 2019-03-20 RX ADMIN — SODIUM CHLORIDE 0.05 UNITS/KG/HR: 9 INJECTION, SOLUTION INTRAVENOUS at 06:02

## 2019-03-20 RX ADMIN — METRONIDAZOLE 500 MG: 500 INJECTION, SOLUTION INTRAVENOUS at 21:13

## 2019-03-20 RX ADMIN — MORPHINE SULFATE 2 MG: 4 INJECTION INTRAVENOUS at 21:12

## 2019-03-20 RX ADMIN — METRONIDAZOLE 500 MG: 500 INJECTION, SOLUTION INTRAVENOUS at 09:48

## 2019-03-20 RX ADMIN — MORPHINE SULFATE 2 MG: 4 INJECTION INTRAVENOUS at 16:22

## 2019-03-20 RX ADMIN — MAGNESIUM SULFATE IN WATER 2 G: 40 INJECTION, SOLUTION INTRAVENOUS at 17:49

## 2019-03-20 RX ADMIN — MORPHINE SULFATE 2 MG: 4 INJECTION INTRAVENOUS at 12:03

## 2019-03-20 RX ADMIN — ONDANSETRON 4 MG: 2 INJECTION INTRAMUSCULAR; INTRAVENOUS at 02:55

## 2019-03-20 RX ADMIN — POTASSIUM CHLORIDE 40 MEQ: 29.8 INJECTION, SOLUTION INTRAVENOUS at 23:31

## 2019-03-20 RX ADMIN — SODIUM CHLORIDE 8 UNITS/HR: 9 INJECTION, SOLUTION INTRAVENOUS at 15:24

## 2019-03-20 RX ADMIN — POTASSIUM CHLORIDE 10 MEQ: 7.46 INJECTION, SOLUTION INTRAVENOUS at 10:44

## 2019-03-20 RX ADMIN — SODIUM CHLORIDE, POTASSIUM CHLORIDE, SODIUM LACTATE AND CALCIUM CHLORIDE 2000 ML: 600; 310; 30; 20 INJECTION, SOLUTION INTRAVENOUS at 08:23

## 2019-03-20 RX ADMIN — SODIUM CHLORIDE, POTASSIUM CHLORIDE, SODIUM LACTATE AND CALCIUM CHLORIDE 2000 ML: 600; 310; 30; 20 INJECTION, SOLUTION INTRAVENOUS at 15:07

## 2019-03-20 RX ADMIN — SODIUM CHLORIDE 6 UNITS/HR: 9 INJECTION, SOLUTION INTRAVENOUS at 08:24

## 2019-03-20 RX ADMIN — PROMETHAZINE HYDROCHLORIDE 25 MG: 25 SUPPOSITORY RECTAL at 19:30

## 2019-03-20 RX ADMIN — POTASSIUM CHLORIDE 40 MEQ: 29.8 INJECTION, SOLUTION INTRAVENOUS at 15:08

## 2019-03-20 RX ADMIN — POTASSIUM CHLORIDE 10 MEQ: 7.46 INJECTION, SOLUTION INTRAVENOUS at 08:30

## 2019-03-20 RX ADMIN — SODIUM CHLORIDE, SODIUM LACTATE, POTASSIUM CHLORIDE, CALCIUM CHLORIDE AND DEXTROSE MONOHYDRATE: 5; 600; 310; 30; 20 INJECTION, SOLUTION INTRAVENOUS at 11:23

## 2019-03-20 RX ADMIN — SODIUM ACETATE 100 MEQ: 164 INJECTION, SOLUTION, CONCENTRATE INTRAVENOUS at 19:48

## 2019-03-20 RX ADMIN — PROMETHAZINE HYDROCHLORIDE 25 MG: 25 SUPPOSITORY RECTAL at 12:09

## 2019-03-20 RX ADMIN — ONDANSETRON 4 MG: 2 INJECTION INTRAMUSCULAR; INTRAVENOUS at 09:46

## 2019-03-20 RX ADMIN — CEFTRIAXONE SODIUM 2 G: 2 INJECTION, POWDER, FOR SOLUTION INTRAMUSCULAR; INTRAVENOUS at 08:46

## 2019-03-20 RX ADMIN — DEXTROSE AND SODIUM CHLORIDE: 10; .45 INJECTION, SOLUTION INTRAVENOUS at 15:25

## 2019-03-20 RX ADMIN — METRONIDAZOLE 500 MG: 500 INJECTION, SOLUTION INTRAVENOUS at 15:10

## 2019-03-20 ASSESSMENT — ENCOUNTER SYMPTOMS
CARDIOVASCULAR NEGATIVE: 1
FEVER: 0
NAUSEA: 1
EYES NEGATIVE: 1
PSYCHIATRIC NEGATIVE: 1
ABDOMINAL PAIN: 1
BACK PAIN: 1
NEUROLOGICAL NEGATIVE: 1
VOMITING: 1
CHILLS: 0
BACK PAIN: 0
CONSTITUTIONAL NEGATIVE: 1
RESPIRATORY NEGATIVE: 1
FLANK PAIN: 1
SHORTNESS OF BREATH: 0

## 2019-03-20 ASSESSMENT — LIFESTYLE VARIABLES: DO YOU DRINK ALCOHOL: NO

## 2019-03-20 NOTE — H&P
Hospital Medicine History & Physical Note    Date of Service  3/20/2019    Primary Care Physician  Navi Huber M.D.    Consultants  None    Code Status  Full code     Chief Complaint  Right flank pain    History of Presenting Illness  28 y.o. female with history of type I diabetes mellitus with multiple prior episodes of DKA, dyslipidemia, was apparently in her usual state of health until a few days prior to admission when she began to have flank pain on the right side, associated with nausea and near intractable vomiting.   She has a difficult time providing further history at this time given the acuity of her illness.  She denies headache, vision change, chest pain, diarrhea or constipation.  No reported fever or chills.      Review of Systems  Review of Systems   Constitutional: Negative.    HENT: Negative.    Eyes: Negative.    Respiratory: Negative.    Cardiovascular: Negative.    Gastrointestinal: Positive for nausea and vomiting.   Genitourinary: Positive for flank pain.   Musculoskeletal: Negative for back pain and joint pain.   Skin: Negative.    Neurological: Negative.    Endo/Heme/Allergies: Negative.    Psychiatric/Behavioral: Negative.        Past Medical History   has a past medical history of Backpain; Bronchitis; Diabetes type 1, controlled ([facility]); DKA (diabetic ketoacidoses) ([facility]); Fall; Gastroparesis; Kidney infection; Pneumonia; and UTI (urinary tract infection).    Surgical History   has a past surgical history that includes gastroscopy-endo (9/18/2014); gastroscopy with biopsy (9/18/2014); other; submandible abscess incision and drainage (Left, 11/8/2017); dental extraction(s) (11/8/2017); and gastroscopy-endo (N/A, 6/9/2018).     Family History  family history includes Cancer in her unknown relative; Hypertension in her maternal grandfather.     Social History   reports that she has never smoked. She has never used smokeless tobacco. She reports that she does not drink alcohol or  use drugs.    Allergies  Allergies   Allergen Reactions   • Pcn [Penicillins] Shortness of Breath and Swelling     Per patient's Mom, patient tolerates Keflex   • Lisinopril Unspecified     Per historical: Reported pedal swelling. No facial/angioedema or rash nor respiratory symptoms.    • Promethazine Vomiting       Medications  Prior to Admission Medications   Prescriptions Last Dose Informant Patient Reported? Taking?   Cholecalciferol 2000 UNIT Cap  Patient Yes No   Sig: Take 2,000 Units by mouth every day.   Insulin Glargine (BASAGLAR KWIKPEN) 100 UNIT/ML Solution Pen-injector   No No   Sig: Inject 30 Units as instructed 2 Times a Day.   Omega-3 Fatty Acids (OMEGA 3 PO)  Patient Yes No   Sig: Take 1 Dose by mouth every day. Unknown OTC Strength   atorvastatin (LIPITOR) 20 MG Tab  Patient No No   Sig: Take 1 Tab by mouth every day.   erythromycin base (UMU-TAB) 250 MG Tablet Delayed Response   No No   Sig: Take 1 Tab by mouth 4 Times a Day,Before Meals and at Bedtime.   glucose 4 g 4 g Chew Tab   No No   Sig: Take 4 Tabs by mouth as needed for Low Blood Sugar (If FSBG is less than or equal to 70 mg/dL and patient able to eat or drink).   insulin lispro, Human, (ADMELOG SOLOSTAR) 100 UNIT/ML Solution Pen-injector injection   No No   Sig: Inject 2-32 Units as instructed 3 times a day before meals.   metoclopramide (REGLAN) 10 MG/10ML Solution  Patient Yes No   Sig: Take 10 mg by mouth 3 times a day before meals.   omeprazole (PRILOSEC) 20 MG delayed-release capsule  Patient No No   Sig: Take 1 Cap by mouth every day.      Facility-Administered Medications: None       Physical Exam  Temp:  [36.1 °C (97 °F)] 36.1 °C (97 °F)  Pulse:  [119-153] 124  Resp:  [22-28] 24  BP: (106)/(68) 106/68  SpO2:  [98 %-99 %] 99 %    Physical Exam   Constitutional: She is oriented to person, place, and time. She appears well-developed and well-nourished. She appears distressed.   HENT:   Head: Normocephalic and atraumatic.   Eyes:  Pupils are equal, round, and reactive to light. Conjunctivae are normal.   Neck: Normal range of motion. Neck supple. No tracheal deviation present. No thyromegaly present.   Cardiovascular: Normal rate, regular rhythm and normal heart sounds.  Exam reveals no gallop and no friction rub.    No murmur heard.  Pulmonary/Chest: Breath sounds normal. She is in respiratory distress. She has no wheezes. She has no rales.   [place] respirations    Abdominal: Soft. Bowel sounds are normal. She exhibits no distension. There is no tenderness. There is no rebound.   Musculoskeletal: Normal range of motion. She exhibits no edema.   Lymphadenopathy:     She has no cervical adenopathy.   Neurological: She is alert and oriented to person, place, and time. No cranial nerve deficit.   Skin: Skin is warm and dry. She is not diaphoretic.   Psychiatric: She has a normal mood and affect.   Nursing note and vitals reviewed.      Laboratory:  Recent Labs      03/20/19 0229   WBC  22.2*   RBC  5.62*   HEMOGLOBIN  16.0   HEMATOCRIT  48.4*   MCV  86.1   MCH  28.5   MCHC  33.1*   RDW  43.6   PLATELETCT  312   MPV  10.8     Recent Labs      03/20/19 0229 03/20/19   0551   SODIUM  139  144   POTASSIUM  4.2  4.7   CHLORIDE  110  114*   CO2  <5*  <5*   GLUCOSE  332*  396*   BUN  30*  28*   CREATININE  1.38  1.21   CALCIUM  8.8  7.6*     Recent Labs      03/20/19 0229 03/20/19   0551   ALTSGPT  11   --    ASTSGOT  10*   --    ALKPHOSPHAT  134*   --    TBILIRUBIN  0.4   --    GLUCOSE  332*  396*                 No results for input(s): TROPONINI in the last 72 hours.    Urinalysis:    Recent Labs      03/20/19   0536   SPECGRAVITY  1.021   GLUCOSEUR  >=1000*   KETONES  >=160   NITRITE  Negative   LEUKESTERAS  Negative   WBCURINE  0-2   RBCURINE  2-5*   BACTERIA  Negative   EPITHELCELL  Negative        Imaging:  CT-RENAL COLIC EVALUATION(A/P W/O)   Final Result         1.  Mediastinal air anteriorly, of uncertain etiology. Correlate with  history, additional workup as clinically appropriate.   2.  Markedly distended bladder, consider bladder atony or bladder outlet obstruction as clinically appropriate. Incidentally catheter placement.      DX-CHEST-PORTABLE (1 VIEW)   Final Result         1.  No acute cardiopulmonary disease.            Assessment/Plan:  I anticipate this patient will require at least two midnights for appropriate medical management, necessitating inpatient admission.    * DKA (diabetic ketoacidoses) (HCC)   Assessment & Plan    Recurrent, unclear etiology, although suspect at least in part due to non-compliance with medications.  Patient unable to state her current insulin dosing at this time. Will admit to ICU with DKA protocol, LR given significant acidosis and monitor.  Inciting even difficult to ascertain, as patient does have evidence of possible esophageal perforation, but may have occurred due to significant vomiting from DKA.  Monitor.       DM (diabetes mellitus) type 1, uncontrolled, with ketoacidosis (HCC)- (present on admission)   Assessment & Plan    Will need ongoing education.  Family asking for port-a-cath placement given frequency of need for central line placement for multiple episodes of DKA.      Esophageal rupture   Assessment & Plan    Likely due to B[place] in setting of intractable vomiting.  Will monitor with antibiotic therapy for now with rocephin and flagyl.       Dyslipidemia- (present on admission)   Assessment & Plan    Statin therapy          VTE prophylaxis: SCD, lovenox

## 2019-03-20 NOTE — ASSESSMENT & PLAN NOTE
Likely due to [place] in setting of intractable vomiting.  Will monitor with antibiotic therapy for now with rocephin and flagyl.

## 2019-03-20 NOTE — ASSESSMENT & PLAN NOTE
Uncontrolled diabetes, she is frequently admitted for DKA, presents with the same  Likely due to non-compliance, complicated by gastroparesis  Her nausea vomiting is better today, advance to regular diet  Subcutaneous insulin, intensify sliding scale as she has hyperglycemia

## 2019-03-20 NOTE — CONSULTS
Critical Care Consultation    Date of consult: 3/20/2019    Referring Physician  Jazmyne Flores M.D.    Reason for Consultation  DKA    History of Presenting Illness  28 y.o. female who presented 3/20/2019 with DKA, gastroparesis that presented with one day of acute severe back pain. She mariano vomiting to this or feeling sick or missing diabetic medication. She present to ER found to have DKA HCO <5, BUN 30, cr 1.38 glucose of 332, urine with ketones, WBC of 22 with no bandemia she was given 2L of fluids and started on insulin gtt. She also had abdominal CT scan preformed for her uncontrolling vomiting and flank/back pain which found small amounts of mediastinal air. She was started on antibiotics ceftriaxone and metronidazole. Patient history is limited due to her acuity and continued retching when I see her. She describe that the pain came first not vomiting then pain in her back. Mariano fever chills or dysuria. She dose describe some pleuritic pain.     Code Status  Full Code    Review of Systems  Review of Systems   Unable to perform ROS: Acuity of condition   Constitutional: Negative for chills and fever.   Respiratory: Negative for shortness of breath.    Gastrointestinal: Positive for abdominal pain, nausea and vomiting.   Musculoskeletal: Positive for back pain.       Past Medical History   has a past medical history of Backpain; Bronchitis; Diabetes type 1, controlled (HCC); DKA (diabetic ketoacidoses) (HCC); Fall; Gastroparesis; Kidney infection; Pneumonia; and UTI (urinary tract infection).    Surgical History   has a past surgical history that includes gastroscopy-endo (9/18/2014); gastroscopy with biopsy (9/18/2014); other; submandible abscess incision and drainage (Left, 11/8/2017); dental extraction(s) (11/8/2017); and gastroscopy-endo (N/A, 6/9/2018).    Family History  family history includes Cancer in her unknown relative; Hypertension in her maternal grandfather.    Social History   reports  that she has never smoked. She has never used smokeless tobacco. She reports that she does not drink alcohol or use drugs.    Medications  Home Medications     Reviewed by Blade Brice (Pharmacy [facility]) on 03/20/19 at 0928  Med List Status: Unable to Obtain   Medication Last Dose Status   atorvastatin (LIPITOR) 20 MG Tab  Active   Cholecalciferol 2000 UNIT Cap  Active   erythromycin base (UMU-TAB) 250 MG Tablet Delayed Response  Active   glucose 4 g 4 g Chew Tab  Active   Insulin Glargine (BASAGLAR KWIKPEN) 100 UNIT/ML Solution Pen-injector  Active   insulin lispro, Human, (ADMELOG SOLOSTAR) 100 UNIT/ML Solution Pen-injector injection  Active   metoclopramide (REGLAN) 10 MG/10ML Solution  Active   Omega-3 Fatty Acids (OMEGA 3 PO)  Active   omeprazole (PRILOSEC) 20 MG delayed-release capsule  Active              Current Facility-Administered Medications   Medication Dose Route Frequency Provider Last Rate Last Dose   • glucose 4 g chewable tablet 16 g  16 g Oral Q15 MIN PRN Jovani Carlisle M.D.        And   • dextrose 50% (D50W) injection 25 mL  25 mL Intravenous Q15 MIN PRN Jovani Carlisle M.D.       • acetaminophen (TYLENOL) tablet 650 mg  650 mg Oral Q6HRS PRN Max Browning M.D.       • Pharmacy Consult Request ...Pain Management Review 1 Each  1 Each Other PHARMACY TO DOSE Max Browning M.D.        And   • oxyCODONE immediate-release (ROXICODONE) tablet 2.5 mg  2.5 mg Oral Q3HRS PRN Max Browning M.D.        And   • oxyCODONE immediate-release (ROXICODONE) tablet 5 mg  5 mg Oral Q3HRS PRN Max Browning M.D.        And   • morphine (pf) 4 mg/ml injection 2 mg  2 mg Intravenous Q3HRS PRN Max Browning M.D.   2 mg at 03/20/19 1622   • cloNIDine (CATAPRES) tablet 0.1 mg  0.1 mg Oral Q6HRS PRN Max Browning M.D.       • ondansetron (ZOFRAN) syringe/vial injection 4 mg  4 mg Intravenous Q4HRS PRN Max Browning M.D.   4 mg at 03/20/19 1623   • ondansetron (ZOFRAN ODT) dispertab 4 mg  4 mg Oral  Q4HRS PRN Max Browning M.D.       • promethazine (PHENERGAN) tablet 12.5-25 mg  12.5-25 mg Oral Q4HRS PRN Max Browning M.D.       • promethazine (PHENERGAN) suppository 12.5-25 mg  12.5-25 mg Rectal Q4HRS PRN Max Browning M.D.   25 mg at 03/20/19 1209   • prochlorperazine (COMPAZINE) injection 5-10 mg  5-10 mg Intravenous Q4HRS PRN Max Browning M.D.       • senna-docusate (PERICOLACE or SENOKOT S) 8.6-50 MG per tablet 2 Tab  2 Tab Oral BID Max Browning M.D.   Stopped at 03/20/19 0815    And   • polyethylene glycol/lytes (MIRALAX) PACKET 1 Packet  1 Packet Oral QDAY PRN Max Browning M.D.        And   • magnesium hydroxide (MILK OF MAGNESIA) suspension 30 mL  30 mL Oral QDAY PRN Max Browning M.D.        And   • bisacodyl (DULCOLAX) suppository 10 mg  10 mg Rectal QDAY PRN Max Browning M.D.       • dextrose 10% and 0.45% NaCl infusion   Intravenous Continuous Max Browning M.D. 100 mL/hr at 03/20/19 1525     • MD ALERT-PHARMACY TO CONSULT FOR DKA MONITORING 1 Each  1 Each Other PRN Max Browning M.D.       • Adult DKA potassium(K+) replacement scale  1 Each Intravenous Q4HRS Max Browning M.D.   1 Each at 03/20/19 1400   • magnesium sulfate IVPB premix 2 g  2 g Intravenous Once PRN Max Browning M.D.        Or   • magnesium sulfate IVPB premix 4 g  4 g Intravenous Once PRN Max Browning M.D.       • potassium phosphates 30 mmol in  mL ivpb  30 mmol Intravenous Once PRN Max Browning M.D.        Or   • sodium phosphate 30 mmol in 1/2  mL ivpb  30 mmol Intravenous Once PRN Max Browning M.D.       • lactated ringers infusion   Intravenous Continuous Max Browning M.D.   Stopped at 03/20/19 0815   • D5LR infusion   Intravenous Continuous Max Browning M.D. 125 mL/hr at 03/20/19 1123     • enoxaparin (LOVENOX) inj 40 mg  40 mg Subcutaneous DAILY Max Browning M.D.       • cefTRIAXone (ROCEPHIN) 2 g in  mL IVPB  2 g Intravenous Q24HRS Max Browning M.D.    Stopped at 03/20/19 0916   • metroNIDAZOLE (FLAGYL) IVPB 500 mg  500 mg Intravenous Q8HRS Max Browning M.D. 100 mL/hr at 03/20/19 1510 500 mg at 03/20/19 1510   • insulin regular human (HUMULIN/NOVOLIN R) 62.5 Units in  mL Infusion for DKA  6 Units/hr Intravenous Continuous Max Browning M.D. 32 mL/hr at 03/20/19 1524 8 Units/hr at 03/20/19 1524   • potassium chloride in water (KCL) ivpb **Administer in ICU only** 40 mEq  40 mEq Intravenous Once Hugo Benz M.D. 25 mL/hr at 03/20/19 1508 40 mEq at 03/20/19 1508       Allergies  Allergies   Allergen Reactions   • Pcn [Penicillins] Shortness of Breath and Swelling     Per patient's Mom, patient tolerates Keflex   • Lisinopril Unspecified     Per historical: Reported pedal swelling. No facial/angioedema or rash nor respiratory symptoms.    • Promethazine Vomiting       Vital Signs last 24 hours  Temp:  [36.1 °C (97 °F)-36.6 °C (97.8 °F)] 36.6 °C (97.8 °F)  Pulse:  [117-153] 119  Resp:  [14-28] 14  BP: (106)/(68) 106/68  SpO2:  [98 %-100 %] 99 %    Physical Exam  Physical Exam   Constitutional: She is oriented to person, place, and time. She appears distressed.   Ill appearing female retching vomiting at bedside   HENT:   Head: Normocephalic.   Mouth/Throat: Oropharynx is clear and moist. No oropharyngeal exudate.   Eyes: Pupils are equal, round, and reactive to light.   Neck: No JVD present.   Cardiovascular: Exam reveals no gallop and no friction rub.    No murmur heard.  No Chester's crunch   Pulmonary/Chest: She is in respiratory distress. She has no wheezes. She has no rales.   Tachypnea, no crepitus is felt over neck or chest wall.    Abdominal: She exhibits no distension. There is no tenderness. There is no rebound and no guarding.   Musculoskeletal: She exhibits no edema.   Mottled to bilateral arms   Neurological: She is alert and oriented to person, place, and time. No cranial nerve deficit. Coordination normal.   Skin: Skin is warm. She is  diaphoretic. No erythema.   Psychiatric: She has a normal mood and affect.       Fluids    Intake/Output Summary (Last 24 hours) at 03/20/19 1725  Last data filed at 03/20/19 1600   Gross per 24 hour   Intake          3072.14 ml   Output             1300 ml   Net          1772.14 ml       Laboratory  Recent Results (from the past 48 hour(s))   ACCU-CHEK GLUCOSE    Collection Time: 03/20/19  1:05 AM   Result Value Ref Range    Glucose - Accu-Ck 229 (H) 65 - 99 mg/dL   CBC WITH DIFFERENTIAL    Collection Time: 03/20/19  2:29 AM   Result Value Ref Range    WBC 22.2 (H) 4.8 - 10.8 K/uL    RBC 5.62 (H) 4.20 - 5.40 M/uL    Hemoglobin 16.0 12.0 - 16.0 g/dL    Hematocrit 48.4 (H) 37.0 - 47.0 %    MCV 86.1 81.4 - 97.8 fL    MCH 28.5 27.0 - 33.0 pg    MCHC 33.1 (L) 33.6 - 35.0 g/dL    RDW 43.6 35.9 - 50.0 fL    Platelet Count 312 164 - 446 K/uL    MPV 10.8 9.0 - 12.9 fL    Neutrophils-Polys 85.00 (H) 44.00 - 72.00 %    Lymphocytes 7.50 (L) 22.00 - 41.00 %    Monocytes 6.40 0.00 - 13.40 %    Eosinophils 0.00 0.00 - 6.90 %    Basophils 0.30 0.00 - 1.80 %    Immature Granulocytes 0.80 0.00 - 0.90 %    Nucleated RBC 0.00 /100 WBC    Neutrophils (Absolute) 18.90 (H) 2.00 - 7.15 K/uL    Lymphs (Absolute) 1.66 1.00 - 4.80 K/uL    Monos (Absolute) 1.42 (H) 0.00 - 0.85 K/uL    Eos (Absolute) 0.00 0.00 - 0.51 K/uL    Baso (Absolute) 0.06 0.00 - 0.12 K/uL    Immature Granulocytes (abs) 0.18 (H) 0.00 - 0.11 K/uL    NRBC (Absolute) 0.00 K/uL   COMP METABOLIC PANEL    Collection Time: 03/20/19  2:29 AM   Result Value Ref Range    Sodium 139 135 - 145 mmol/L    Potassium 4.2 3.6 - 5.5 mmol/L    Chloride 110 96 - 112 mmol/L    Co2 <5 (LL) 20 - 33 mmol/L    Anion Gap 24.0 (H) 0.0 - 11.9    Glucose 332 (H) 65 - 99 mg/dL    Bun 30 (H) 8 - 22 mg/dL    Creatinine 1.38 0.50 - 1.40 mg/dL    Calcium 8.8 8.5 - 10.5 mg/dL    AST(SGOT) 10 (L) 12 - 45 U/L    ALT(SGPT) 11 2 - 50 U/L    Alkaline Phosphatase 134 (H) 30 - 99 U/L    Total Bilirubin 0.4 0.1 -  1.5 mg/dL    Albumin 4.8 3.2 - 4.9 g/dL    Total Protein 7.3 6.0 - 8.2 g/dL    Globulin 2.5 1.9 - 3.5 g/dL    A-G Ratio 1.9 g/dL   LACTIC ACID    Collection Time: 03/20/19  2:29 AM   Result Value Ref Range    Lactic Acid 2.0 0.5 - 2.0 mmol/L   ESTIMATED GFR    Collection Time: 03/20/19  2:29 AM   Result Value Ref Range    GFR If  54 (A) >60 mL/min/1.73 m 2    GFR If Non African American 45 (A) >60 mL/min/1.73 m 2   BETA-HCG QUALITATIVE SERUM    Collection Time: 03/20/19  2:29 AM   Result Value Ref Range    Beta-Hcg Qualitative Serum Negative Negative   MAGNESIUM    Collection Time: 03/20/19  2:29 AM   Result Value Ref Range    Magnesium 2.1 1.5 - 2.5 mg/dL   PHOSPHORUS    Collection Time: 03/20/19  2:29 AM   Result Value Ref Range    Phosphorus 5.3 (H) 2.5 - 4.5 mg/dL   BETA-HYDROXYBUTYRIC ACID    Collection Time: 03/20/19  2:30 AM   Result Value Ref Range    beta-Hydroxybutyric Acid >8.00 (H) 0.02 - 0.27 mmol/L   URINALYSIS CULTURE, IF INDICATED    Collection Time: 03/20/19  5:36 AM   Result Value Ref Range    Color Yellow     Character Clear     Specific Gravity 1.021 <1.035    Ph 5.0 5.0 - 8.0    Glucose >=1000 (A) Negative mg/dL    Ketones >=160 Negative mg/dL    Protein 100 (A) Negative mg/dL    Bilirubin Negative Negative    Urobilinogen, Urine 0.2 Negative    Nitrite Negative Negative    Leukocyte Esterase Negative Negative    Occult Blood Negative Negative    Micro Urine Req Microscopic    URINE MICROSCOPIC (W/UA)    Collection Time: 03/20/19  5:36 AM   Result Value Ref Range    WBC 0-2 /hpf    RBC 2-5 (A) /hpf    Bacteria Negative None /hpf    Epithelial Cells Negative /hpf    Hyaline Cast 0-2 /lpf   ACCU-CHEK GLUCOSE    Collection Time: 03/20/19  5:42 AM   Result Value Ref Range    Glucose - Accu-Ck 328 (H) 65 - 99 mg/dL   BASIC METABOLIC PANEL    Collection Time: 03/20/19  5:51 AM   Result Value Ref Range    Sodium 144 135 - 145 mmol/L    Potassium 4.7 3.6 - 5.5 mmol/L    Chloride 114 (H)  96 - 112 mmol/L    Co2 <5 (LL) 20 - 33 mmol/L    Glucose 396 (H) 65 - 99 mg/dL    Bun 28 (H) 8 - 22 mg/dL    Creatinine 1.21 0.50 - 1.40 mg/dL    Calcium 7.6 (L) 8.5 - 10.5 mg/dL    Anion Gap 25.0 (H) 0.0 - 11.9   ESTIMATED GFR    Collection Time: 03/20/19  5:51 AM   Result Value Ref Range    GFR If African American >60 >60 mL/min/1.73 m 2    GFR If Non African American 52 (A) >60 mL/min/1.73 m 2   ACCU-CHEK GLUCOSE    Collection Time: 03/20/19  7:10 AM   Result Value Ref Range    Glucose - Accu-Ck 337 (H) 65 - 99 mg/dL   ACCU-CHEK GLUCOSE    Collection Time: 03/20/19  8:11 AM   Result Value Ref Range    Glucose - Accu-Ck 369 (H) 65 - 99 mg/dL   CBC with Differential    Collection Time: 03/20/19  8:56 AM   Result Value Ref Range    WBC 18.7 (H) 4.8 - 10.8 K/uL    RBC 5.13 4.20 - 5.40 M/uL    Hemoglobin 14.4 12.0 - 16.0 g/dL    Hematocrit 44.3 37.0 - 47.0 %    MCV 86.4 81.4 - 97.8 fL    MCH 28.1 27.0 - 33.0 pg    MCHC 32.5 (L) 33.6 - 35.0 g/dL    RDW 44.3 35.9 - 50.0 fL    Platelet Count 251 164 - 446 K/uL    MPV 10.2 9.0 - 12.9 fL    Neutrophils-Polys 79.90 (H) 44.00 - 72.00 %    Lymphocytes 11.80 (L) 22.00 - 41.00 %    Monocytes 6.90 0.00 - 13.40 %    Eosinophils 0.00 0.00 - 6.90 %    Basophils 0.40 0.00 - 1.80 %    Immature Granulocytes 1.00 (H) 0.00 - 0.90 %    Nucleated RBC 0.00 /100 WBC    Neutrophils (Absolute) 14.97 (H) 2.00 - 7.15 K/uL    Lymphs (Absolute) 2.20 1.00 - 4.80 K/uL    Monos (Absolute) 1.29 (H) 0.00 - 0.85 K/uL    Eos (Absolute) 0.00 0.00 - 0.51 K/uL    Baso (Absolute) 0.07 0.00 - 0.12 K/uL    Immature Granulocytes (abs) 0.19 (H) 0.00 - 0.11 K/uL    NRBC (Absolute) 0.00 K/uL   Comp Metabolic Panel with Phosphorus, Magnesium    Collection Time: 03/20/19  8:56 AM   Result Value Ref Range    Sodium 143 135 - 145 mmol/L    Potassium 4.6 3.6 - 5.5 mmol/L    Chloride 116 (H) 96 - 112 mmol/L    Co2 <5 (LL) 20 - 33 mmol/L    Anion Gap 22.0 (H) 0.0 - 11.9    Glucose 350 (H) 65 - 99 mg/dL    Bun 30 (H) 8 -  22 mg/dL    Creatinine 1.36 0.50 - 1.40 mg/dL    Calcium 7.7 (L) 8.5 - 10.5 mg/dL    AST(SGOT) 10 (L) 12 - 45 U/L    ALT(SGPT) 11 2 - 50 U/L    Alkaline Phosphatase 115 (H) 30 - 99 U/L    Total Bilirubin 0.3 0.1 - 1.5 mg/dL    Albumin 4.3 3.2 - 4.9 g/dL    Total Protein 6.8 6.0 - 8.2 g/dL    Globulin 2.5 1.9 - 3.5 g/dL    A-G Ratio 1.7 g/dL   MAGNESIUM    Collection Time: 03/20/19  8:56 AM   Result Value Ref Range    Magnesium 1.9 1.5 - 2.5 mg/dL   PHOSPHORUS    Collection Time: 03/20/19  8:56 AM   Result Value Ref Range    Phosphorus 3.8 2.5 - 4.5 mg/dL   ESTIMATED GFR    Collection Time: 03/20/19  8:56 AM   Result Value Ref Range    GFR If  55 (A) >60 mL/min/1.73 m 2    GFR If Non  46 (A) >60 mL/min/1.73 m 2   ACCU-CHEK GLUCOSE    Collection Time: 03/20/19  9:06 AM   Result Value Ref Range    Glucose - Accu-Ck 296 (H) 65 - 99 mg/dL   ACCU-CHEK GLUCOSE    Collection Time: 03/20/19 10:10 AM   Result Value Ref Range    Glucose - Accu-Ck 253 (H) 65 - 99 mg/dL   ACCU-CHEK GLUCOSE    Collection Time: 03/20/19 11:09 AM   Result Value Ref Range    Glucose - Accu-Ck 209 (H) 65 - 99 mg/dL   ACCU-CHEK GLUCOSE    Collection Time: 03/20/19 12:05 PM   Result Value Ref Range    Glucose - Accu-Ck 183 (H) 65 - 99 mg/dL   ACCU-CHEK GLUCOSE    Collection Time: 03/20/19  1:26 PM   Result Value Ref Range    Glucose - Accu-Ck 170 (H) 65 - 99 mg/dL   Basic Metabolic Panel (BMP)    Collection Time: 03/20/19  1:30 PM   Result Value Ref Range    Sodium 145 135 - 145 mmol/L    Potassium 3.3 (L) 3.6 - 5.5 mmol/L    Chloride 120 (H) 96 - 112 mmol/L    Co2 9 (LL) 20 - 33 mmol/L    Glucose 183 (H) 65 - 99 mg/dL    Bun 29 (H) 8 - 22 mg/dL    Creatinine 1.25 0.50 - 1.40 mg/dL    Calcium 8.0 (L) 8.5 - 10.5 mg/dL    Anion Gap 16.0 (H) 0.0 - 11.9   ESTIMATED GFR    Collection Time: 03/20/19  1:30 PM   Result Value Ref Range    GFR If African American >60 >60 mL/min/1.73 m 2    GFR If Non  50 (A) >60  mL/min/1.73 m 2   MAGNESIUM    Collection Time: 03/20/19  1:30 PM   Result Value Ref Range    Magnesium 1.6 1.5 - 2.5 mg/dL   PHOSPHORUS    Collection Time: 03/20/19  1:30 PM   Result Value Ref Range    Phosphorus 1.1 (L) 2.5 - 4.5 mg/dL       Imaging  CT-RENAL COLIC EVALUATION(A/P W/O)   Final Result         1.  Mediastinal air anteriorly, of uncertain etiology. Correlate with history, additional workup as clinically appropriate.   2.  Markedly distended bladder, consider bladder atony or bladder outlet obstruction as clinically appropriate. Incidentally catheter placement.      DX-CHEST-PORTABLE (1 VIEW)   Final Result         1.  No acute cardiopulmonary disease.          Assessment/Plan  DM (diabetes mellitus) type 1, uncontrolled, with ketoacidosis (HCC)- (present on admission)   Assessment & Plan    Reason for DKA: Vomiting/pain esophageal injury infection  (rule out infection, AMI,CVA, medication, thiazide, beta blocker,antipsychotic, steroids, pregnancy, pancreatitis)  Follow Q 1 hour accuchecks, serial chemistry and aggressive replace K, PO4, Mg  Check serum osm, ketones, hgA1c, vbg/abg  Fluid resus NS/LR  Start long acting Insulin    Patient under resuscitated on exam will give 2 more L wide open of LR.            Gastroparesis due to DM (HCC)- (present on admission)   Assessment & Plan    Likely DKA worsening baseline gastroparesis with ketosis  Reglan and antiemetics  Can also use low dose haldol     CHERELLE (acute kidney injury) (HCC)   Assessment & Plan    Likely related to dehydration  Avoid nephrotoxins  No hydro on ct scan 3/20       Pneumomediastinum (HCC)   Assessment & Plan    Small anterior mediastinal air unclear etiology at this time  Serial CXR will need further workup with CT chest when renal function improves likely tomorrow after resusciatation to rule out esophageal rupture.          Discussed patient condition and risk of morbidity and/or mortality with RN and Patient.    The patient remains  critically ill with DKA requiring insulin drip with active titration.  Critical care time = 45 minutes in directly providing and coordinating critical care and extensive data review.  No time overlap and excludes procedures.

## 2019-03-20 NOTE — PROGRESS NOTES
Spoke with Dr. Benz in regards to most recent vitals, critical CO2 that has come up to 9. Per Dr. Benz order  2 more liters of LR at this time and continue to follow DKA protocol. Also discussed concern for esophageal tear and giving Pt ice and H20 per admission order. Per Dr. Benz ok to continue with this order.

## 2019-03-20 NOTE — PROGRESS NOTES
Late entry    2 RN assessment completed with Grecia BURCH Skin intact/ Patient has small scabs located on back of thigh. Slight redness under bilateral breasts. No concerning areas for pressure ulcers. Pt turning self in bed.

## 2019-03-20 NOTE — ED TRIAGE NOTES
Alis Sparks  28 y.o.  Chief Complaint   Patient presents with   • Flank Pain     bilat flank pain   • High Blood Sugar     412 at home, took insulin PTA,  per EMS   • N/V     x10 episodes today     Pt BIB EMS from home. Pt has a c/o vomiting and high blood sugar that started 3-4 hours PTA.  Patient has type 1 DM and hx DKA, last admission was in February.  Patient is very tachypneic and tachycardic and has guarding flanks.  Denies diarrhea or dysuria.      Pt flank pain 10/10 at this time.    Changed into gown.  Chart up for ERP.

## 2019-03-20 NOTE — ED PROVIDER NOTES
ED Provider Note    CHIEF COMPLAINT  Chief Complaint   Patient presents with   • Flank Pain     bilat flank pain   • High Blood Sugar     412 at home, took insulin PTA,  per EMS   • N/V     x10 episodes today       HPI  Alis Sparks is a 28 y.o. female who presents for evaluation of bilateral flank pain and nausea and vomiting.  Flank pain has been there all day yesterday however the nausea and vomiting did not start till midnight.  She notes decreased urine output but no burning with urination or blood in the urine.    REVIEW OF SYSTEMS  Constitutional: No fevers.  Occasional chills with generalized malaise  Skin: No rashes, abrasions, lacerations, or pruritus  HEENT: No ear pain, ringing in ears, or decreased hearing. No sore throat, runny nose, sores, trouble swallowing, trouble speaking.  Neck: No neck pain, stiffness, or masses.  Chest: No pain or rashes  Pulm: Breathing heavily  Gastrointestinal: Nausea, vomiting, no diarrhea or constipation.  Diffuse bilateral flank pain.  Genitourinary: No dysuria or hematuria  Musculoskeletal: No recent trauma, pain, swelling, weakness  Neurologic: No sensory or motor changes. No confusion or disorientation.  Heme: No bleeding or bruising problems.   Immuno: No hx of recurrent infections      PAST MEDICAL HISTORY   has a past medical history of Backpain; Bronchitis; Diabetes type 1, controlled ([facility]); DKA (diabetic ketoacidoses) ([facility]); Fall; Gastroparesis; Kidney infection; Pneumonia; and UTI (urinary tract infection).    SOCIAL HISTORY  Social History     Social History Main Topics   • Smoking status: Never Smoker   • Smokeless tobacco: Never Used   • Alcohol use No   • Drug use: No   • Sexual activity: No       SURGICAL HISTORY   has a past surgical history that includes gastroscopy-endo (9/18/2014); gastroscopy with biopsy (9/18/2014); other; submandible abscess incision and drainage (Left, 11/8/2017); dental extraction(s) (11/8/2017); and  "gastroscopy-endo (N/A, 6/9/2018).    CURRENT MEDICATIONS  Home Medications     Reviewed by Eleazar Jonas R.N. (Registered Nurse) on 03/20/19 at 0120  Med List Status: Complete   Medication Last Dose Status   atorvastatin (LIPITOR) 20 MG Tab  Active   Cholecalciferol 2000 UNIT Cap  Active   erythromycin base (UMU-TAB) 250 MG Tablet Delayed Response  Active   glucose 4 g 4 g Chew Tab  Active   Insulin Glargine (BASAGLAR KWIKPEN) 100 UNIT/ML Solution Pen-injector  Active   insulin lispro, Human, (ADMELOG SOLOSTAR) 100 UNIT/ML Solution Pen-injector injection  Active   metoclopramide (REGLAN) 10 MG/10ML Solution  Active   Omega-3 Fatty Acids (OMEGA 3 PO)  Active   omeprazole (PRILOSEC) 20 MG delayed-release capsule  Active                ALLERGIES  Allergies   Allergen Reactions   • Pcn [Penicillins] Shortness of Breath and Swelling     Per patient's Mom, patient tolerates Keflex   • Lisinopril Unspecified     Per historical: Reported pedal swelling. No facial/angioedema or rash nor respiratory symptoms.    • Promethazine Vomiting       PHYSICAL EXAM  VITAL SIGNS: /68   Pulse (!) 124   Temp 36.1 °C (97 °F) (Temporal)   Resp (!) 24   Ht 1.651 m (5' 5\")   Wt 80 kg (176 lb 5.9 oz)   LMP 02/27/2019   SpO2 99%   BMI 29.35 kg/m²    Gen: Appears tired, anxious, breathing heavily  HEENT: No signs of trauma, Bilateral external ears normal, Nose normal. Conjunctiva normal, Non-icteric.   Neck:  No tenderness, Supple, No masses.  Extensive scarring on both lateral sides of the anterior cervical area from previous central line placements  Lymphatic: No cervical lymphadenopathy noted.   Cardiovascular: Tachycardia  Thorax & Lungs: Tachypnea, Kussmal respiratory pattern  Abdomen: Bowel sounds normal, Soft, No tenderness, No masses, No pulsatile masses. No Guarding or rebound  Skin: Warm, Dry, No erythema, No rash.   Back: No bony tenderness, diffuse bilateral flank tenderness  Extremities: Intact distal pulses, No " edema  Neurologic: Alert , no facial droop, grossly normal coordination and strength  Psychiatric: Affect normal, Judgment normal, Mood normal.       INITIAL IMPRESSION  Patient's initial findings are suggestive of DKA with severe dehydration.  She is markedly tachycardic and is likely acidotic.  We will begin IV hydration as she will be unable to tolerate enough fluids to adequately rehydrate at this point.  Unfortunately, the patient has 0 peripheral access for sound.  I discussed central line placement with her and she agreed to the procedure.    LABS  Results for orders placed or performed during the hospital encounter of 03/20/19   CBC WITH DIFFERENTIAL   Result Value Ref Range    WBC 22.2 (H) 4.8 - 10.8 K/uL    RBC 5.62 (H) 4.20 - 5.40 M/uL    Hemoglobin 16.0 12.0 - 16.0 g/dL    Hematocrit 48.4 (H) 37.0 - 47.0 %    MCV 86.1 81.4 - 97.8 fL    MCH 28.5 27.0 - 33.0 pg    MCHC 33.1 (L) 33.6 - 35.0 g/dL    RDW 43.6 35.9 - 50.0 fL    Platelet Count 312 164 - 446 K/uL    MPV 10.8 9.0 - 12.9 fL    Neutrophils-Polys 85.00 (H) 44.00 - 72.00 %    Lymphocytes 7.50 (L) 22.00 - 41.00 %    Monocytes 6.40 0.00 - 13.40 %    Eosinophils 0.00 0.00 - 6.90 %    Basophils 0.30 0.00 - 1.80 %    Immature Granulocytes 0.80 0.00 - 0.90 %    Nucleated RBC 0.00 /100 WBC    Neutrophils (Absolute) 18.90 (H) 2.00 - 7.15 K/uL    Lymphs (Absolute) 1.66 1.00 - 4.80 K/uL    Monos (Absolute) 1.42 (H) 0.00 - 0.85 K/uL    Eos (Absolute) 0.00 0.00 - 0.51 K/uL    Baso (Absolute) 0.06 0.00 - 0.12 K/uL    Immature Granulocytes (abs) 0.18 (H) 0.00 - 0.11 K/uL    NRBC (Absolute) 0.00 K/uL   COMP METABOLIC PANEL   Result Value Ref Range    Sodium 139 135 - 145 mmol/L    Potassium 4.2 3.6 - 5.5 mmol/L    Chloride 110 96 - 112 mmol/L    Co2 <5 (LL) 20 - 33 mmol/L    Anion Gap 24.0 (H) 0.0 - 11.9    Glucose 332 (H) 65 - 99 mg/dL    Bun 30 (H) 8 - 22 mg/dL    Creatinine 1.38 0.50 - 1.40 mg/dL    Calcium 8.8 8.5 - 10.5 mg/dL    AST(SGOT) 10 (L) 12 - 45 U/L     ALT(SGPT) 11 2 - 50 U/L    Alkaline Phosphatase 134 (H) 30 - 99 U/L    Total Bilirubin 0.4 0.1 - 1.5 mg/dL    Albumin 4.8 3.2 - 4.9 g/dL    Total Protein 7.3 6.0 - 8.2 g/dL    Globulin 2.5 1.9 - 3.5 g/dL    A-G Ratio 1.9 g/dL   LACTIC ACID   Result Value Ref Range    Lactic Acid 2.0 0.5 - 2.0 mmol/L   URINALYSIS CULTURE, IF INDICATED   Result Value Ref Range    Color Yellow     Character Clear     Specific Gravity 1.021 <1.035    Ph 5.0 5.0 - 8.0    Glucose >=1000 (A) Negative mg/dL    Ketones >=160 Negative mg/dL    Protein 100 (A) Negative mg/dL    Bilirubin Negative Negative    Urobilinogen, Urine 0.2 Negative    Nitrite Negative Negative    Leukocyte Esterase Negative Negative    Occult Blood Negative Negative    Micro Urine Req Microscopic    ESTIMATED GFR   Result Value Ref Range    GFR If  54 (A) >60 mL/min/1.73 m 2    GFR If Non African American 45 (A) >60 mL/min/1.73 m 2   BETA-HCG QUALITATIVE SERUM   Result Value Ref Range    Beta-Hcg Qualitative Serum Negative Negative   BETA-HYDROXYBUTYRIC ACID   Result Value Ref Range    beta-Hydroxybutyric Acid >8.00 (H) 0.02 - 0.27 mmol/L   URINE MICROSCOPIC (W/UA)   Result Value Ref Range    WBC 0-2 /hpf    RBC 2-5 (A) /hpf    Bacteria Negative None /hpf    Epithelial Cells Negative /hpf    Hyaline Cast 0-2 /lpf   MAGNESIUM   Result Value Ref Range    Magnesium 2.1 1.5 - 2.5 mg/dL   PHOSPHORUS   Result Value Ref Range    Phosphorus 5.3 (H) 2.5 - 4.5 mg/dL   BASIC METABOLIC PANEL   Result Value Ref Range    Sodium 144 135 - 145 mmol/L    Potassium 4.7 3.6 - 5.5 mmol/L    Chloride 114 (H) 96 - 112 mmol/L    Co2 <5 (LL) 20 - 33 mmol/L    Glucose 396 (H) 65 - 99 mg/dL    Bun 28 (H) 8 - 22 mg/dL    Creatinine 1.21 0.50 - 1.40 mg/dL    Calcium 7.6 (L) 8.5 - 10.5 mg/dL    Anion Gap 25.0 (H) 0.0 - 11.9   ESTIMATED GFR   Result Value Ref Range    GFR If African American >60 >60 mL/min/1.73 m 2    GFR If Non African American 52 (A) >60 mL/min/1.73 m 2    ACCU-CHEK GLUCOSE   Result Value Ref Range    Glucose - Accu-Ck 229 (H) 65 - 99 mg/dL   ACCU-CHEK GLUCOSE   Result Value Ref Range    Glucose - Accu-Ck 328 (H) 65 - 99 mg/dL   ACCU-CHEK GLUCOSE   Result Value Ref Range    Glucose - Accu-Ck 337 (H) 65 - 99 mg/dL   ACCU-CHEK GLUCOSE   Result Value Ref Range    Glucose - Accu-Ck 369 (H) 65 - 99 mg/dL       RADIOLOGY  CT-RENAL COLIC EVALUATION(A/P W/O)   Final Result         1.  Mediastinal air anteriorly, of uncertain etiology. Correlate with history, additional workup as clinically appropriate.   2.  Markedly distended bladder, consider bladder atony or bladder outlet obstruction as clinically appropriate. Incidentally catheter placement.      DX-CHEST-PORTABLE (1 VIEW)   Final Result         1.  No acute cardiopulmonary disease.        Reevaluation   Time:4:41 AM  Vital signs: Noted per nursing note  Assessment: Still Kusmal breathing, still feels like she can give a urine sample.  Will order cath UA now.      Reevaluation   Time:8:27 AM  Vital signs: Noted per nursing  Assessment: No change    Reevaluation   Time:6:26 AM  Vital signs: Noted per nursing note  Assessment: No significant change      Central Line Note  Date 3/20  Time 0200  Indication -complete lack of IV access  A timeout was completed verifying correct patient, procedure, site, positioning. The patient was placed in Trendelenburg position. Maximal sterile barriers were utilized including, gown, mask, and sterile drape. The patient's right groin was prepped and draped in sterile fashion. 1% lidocaine was used to anesthetize the surrounding area. A triple-lumen 7 [place] central line was introduced into the right femoral vein using Seldinger technique under ultrasound guidance. The catheter was threaded smoothly over the guidewire and appropriate blood return was obtained. Each lumen of the catheter was evacuated of air and flushed with sterile saline. The catheter was sutured in place and a sterile  dressing applied.  Time for procedure 20 minutes  Critical Care Note  Upon my evaluation, this patient had high probability of imminent and life-threatening deterioration due to DKA, which required my direct attention, intervention, and personal management. I personally provided 35 minutes of critical care time exclusive of time spent on separately billable procedures. Time includes review of laboratory data, radiology results, discussion with consultants, and monitoring for potential decompensation.     HYDRATION: Based on the patient's presentation of DKA the patient was given IV fluids. IV Hydration was used because oral hydration was not adequate alone. Upon recheck following hydration, the patient was guarded.    COURSE & MEDICAL DECISION MAKING  Pertinent Labs & Imaging studies reviewed. (See chart for details)  Patient arrives for evaluation of what appears to be DKA in the setting of a possible infectious prodrome although there is no obvious source at this time.  Originally, her flank pain would be suggestive of pyelonephritis however there is no evidence for this either by CT or by urinalysis.  She was noted to have mediastinal air which is suggestive of forceful vomiting and it was notable that she remained fairly tachycardic throughout her stay despite fluids.  Her sugar was not all that impressive but I felt initiation of an insulin drip in the setting of maintenance fluids with dextrose was necessary because her acidosis was so profound.  She did not require mechanical ventilation or pressor support in the emergency department.  She will be admitted to the ICU under the care of the hospitalist service.     FINAL IMPRESSION  1. Diabetic ketoacidosis without coma associated with type 1 diabetes mellitus (HCC)    2. Flank pain    3. Mediastinal air (HCC)        Electronically signed by: Jovani Carlisle, 3/20/2019 1:05 AM

## 2019-03-20 NOTE — PROGRESS NOTES
Pt received from ER, bedside report given. All drips and lines verified. Pt with severe c/o lower back/flank pain and actively vomiting up tan emesis. Pt A&Ox4, 2mg morphine given and Pt tolerated fine. Also gave phenergen suppository as there is a concern for esophageal tear. Pt has no c/o Chest pain.

## 2019-03-20 NOTE — ED NOTES
tech from Lab called with critical result of CO2 <5 at 0657. Critical lab result read back to tech.   Dr. Carlisle notified of critical lab result at 0657.  Critical lab result read back by Dr. Carlisle.

## 2019-03-20 NOTE — CARE PLAN
Problem: Fluid Volume:  Goal: Signs and symptoms of dehydration will decrease    Intervention: Provide fluid volume management  Orders given to give 2 more Liters of LR as CO2 still remains at 9 after 2L's of LR in the ER         Problem: Metabolic:  Goal: Ability to maintain appropriate glucose levels will improve  Outcome: PROGRESSING AS EXPECTED  Blood glucose with gentle decline on insulin at 8u/hr. Now switched continuous fluids to D10 1/2 NS.

## 2019-03-20 NOTE — ED NOTES
tech from Lab called with critical result of CO2 <5 at 0317. Critical lab result read back to tech.   Dr. Carlisle notified of critical lab result at 0317.  Critical lab result read back by Dr. Carlisle.

## 2019-03-21 ENCOUNTER — APPOINTMENT (OUTPATIENT)
Dept: RADIOLOGY | Facility: MEDICAL CENTER | Age: 29
DRG: 638 | End: 2019-03-21
Attending: INTERNAL MEDICINE
Payer: MEDICAID

## 2019-03-21 LAB
ANION GAP SERPL CALC-SCNC: 13 MMOL/L (ref 0–11.9)
ANION GAP SERPL CALC-SCNC: 7 MMOL/L (ref 0–11.9)
ANION GAP SERPL CALC-SCNC: 9 MMOL/L (ref 0–11.9)
BASOPHILS # BLD AUTO: 0.4 % (ref 0–1.8)
BASOPHILS # BLD: 0.04 K/UL (ref 0–0.12)
BUN SERPL-MCNC: 20 MG/DL (ref 8–22)
BUN SERPL-MCNC: 24 MG/DL (ref 8–22)
BUN SERPL-MCNC: 25 MG/DL (ref 8–22)
CALCIUM SERPL-MCNC: 7.6 MG/DL (ref 8.5–10.5)
CALCIUM SERPL-MCNC: 7.9 MG/DL (ref 8.5–10.5)
CALCIUM SERPL-MCNC: 8.1 MG/DL (ref 8.5–10.5)
CHLORIDE SERPL-SCNC: 122 MMOL/L (ref 96–112)
CO2 SERPL-SCNC: 14 MMOL/L (ref 20–33)
CO2 SERPL-SCNC: 18 MMOL/L (ref 20–33)
CO2 SERPL-SCNC: 19 MMOL/L (ref 20–33)
CREAT SERPL-MCNC: 1.25 MG/DL (ref 0.5–1.4)
CREAT SERPL-MCNC: 1.28 MG/DL (ref 0.5–1.4)
CREAT SERPL-MCNC: 1.29 MG/DL (ref 0.5–1.4)
EKG IMPRESSION: NORMAL
EOSINOPHIL # BLD AUTO: 0 K/UL (ref 0–0.51)
EOSINOPHIL NFR BLD: 0 % (ref 0–6.9)
ERYTHROCYTE [DISTWIDTH] IN BLOOD BY AUTOMATED COUNT: 41.8 FL (ref 35.9–50)
GLUCOSE BLD-MCNC: 101 MG/DL (ref 65–99)
GLUCOSE BLD-MCNC: 105 MG/DL (ref 65–99)
GLUCOSE BLD-MCNC: 110 MG/DL (ref 65–99)
GLUCOSE BLD-MCNC: 114 MG/DL (ref 65–99)
GLUCOSE BLD-MCNC: 125 MG/DL (ref 65–99)
GLUCOSE BLD-MCNC: 131 MG/DL (ref 65–99)
GLUCOSE BLD-MCNC: 136 MG/DL (ref 65–99)
GLUCOSE BLD-MCNC: 146 MG/DL (ref 65–99)
GLUCOSE BLD-MCNC: 147 MG/DL (ref 65–99)
GLUCOSE BLD-MCNC: 150 MG/DL (ref 65–99)
GLUCOSE BLD-MCNC: 152 MG/DL (ref 65–99)
GLUCOSE BLD-MCNC: 171 MG/DL (ref 65–99)
GLUCOSE BLD-MCNC: 175 MG/DL (ref 65–99)
GLUCOSE BLD-MCNC: 176 MG/DL (ref 65–99)
GLUCOSE BLD-MCNC: 179 MG/DL (ref 65–99)
GLUCOSE BLD-MCNC: 183 MG/DL (ref 65–99)
GLUCOSE BLD-MCNC: 189 MG/DL (ref 65–99)
GLUCOSE BLD-MCNC: 85 MG/DL (ref 65–99)
GLUCOSE BLD-MCNC: 93 MG/DL (ref 65–99)
GLUCOSE BLD-MCNC: 96 MG/DL (ref 65–99)
GLUCOSE SERPL-MCNC: 106 MG/DL (ref 65–99)
GLUCOSE SERPL-MCNC: 160 MG/DL (ref 65–99)
GLUCOSE SERPL-MCNC: 186 MG/DL (ref 65–99)
HCT VFR BLD AUTO: 34.9 % (ref 37–47)
HGB BLD-MCNC: 11.9 G/DL (ref 12–16)
IMM GRANULOCYTES # BLD AUTO: 0.04 K/UL (ref 0–0.11)
IMM GRANULOCYTES NFR BLD AUTO: 0.4 % (ref 0–0.9)
LYMPHOCYTES # BLD AUTO: 0.96 K/UL (ref 1–4.8)
LYMPHOCYTES NFR BLD: 9.4 % (ref 22–41)
MAGNESIUM SERPL-MCNC: 1.8 MG/DL (ref 1.5–2.5)
MCH RBC QN AUTO: 28.1 PG (ref 27–33)
MCHC RBC AUTO-ENTMCNC: 34.1 G/DL (ref 33.6–35)
MCV RBC AUTO: 82.3 FL (ref 81.4–97.8)
MONOCYTES # BLD AUTO: 1.14 K/UL (ref 0–0.85)
MONOCYTES NFR BLD AUTO: 11.2 % (ref 0–13.4)
NEUTROPHILS # BLD AUTO: 7.99 K/UL (ref 2–7.15)
NEUTROPHILS NFR BLD: 78.6 % (ref 44–72)
NRBC # BLD AUTO: 0 K/UL
NRBC BLD-RTO: 0 /100 WBC
PHOSPHATE SERPL-MCNC: 2.1 MG/DL (ref 2.5–4.5)
PHOSPHATE SERPL-MCNC: 2.3 MG/DL (ref 2.5–4.5)
PLATELET # BLD AUTO: 203 K/UL (ref 164–446)
PMV BLD AUTO: 10.3 FL (ref 9–12.9)
POTASSIUM SERPL-SCNC: 3.2 MMOL/L (ref 3.6–5.5)
POTASSIUM SERPL-SCNC: 3.3 MMOL/L (ref 3.6–5.5)
POTASSIUM SERPL-SCNC: 3.3 MMOL/L (ref 3.6–5.5)
POTASSIUM SERPL-SCNC: 3.4 MMOL/L (ref 3.6–5.5)
PROCALCITONIN SERPL-MCNC: 0.09 NG/ML
RBC # BLD AUTO: 4.24 M/UL (ref 4.2–5.4)
SODIUM SERPL-SCNC: 147 MMOL/L (ref 135–145)
SODIUM SERPL-SCNC: 149 MMOL/L (ref 135–145)
SODIUM SERPL-SCNC: 150 MMOL/L (ref 135–145)
WBC # BLD AUTO: 10.2 K/UL (ref 4.8–10.8)

## 2019-03-21 PROCEDURE — 700111 HCHG RX REV CODE 636 W/ 250 OVERRIDE (IP): Performed by: INTERNAL MEDICINE

## 2019-03-21 PROCEDURE — 83735 ASSAY OF MAGNESIUM: CPT

## 2019-03-21 PROCEDURE — 770022 HCHG ROOM/CARE - ICU (200)

## 2019-03-21 PROCEDURE — 71260 CT THORAX DX C+: CPT

## 2019-03-21 PROCEDURE — 700101 HCHG RX REV CODE 250: Performed by: INTERNAL MEDICINE

## 2019-03-21 PROCEDURE — 700105 HCHG RX REV CODE 258: Performed by: INTERNAL MEDICINE

## 2019-03-21 PROCEDURE — 84145 PROCALCITONIN (PCT): CPT

## 2019-03-21 PROCEDURE — 84132 ASSAY OF SERUM POTASSIUM: CPT

## 2019-03-21 PROCEDURE — 71045 X-RAY EXAM CHEST 1 VIEW: CPT

## 2019-03-21 PROCEDURE — 93005 ELECTROCARDIOGRAM TRACING: CPT | Performed by: INTERNAL MEDICINE

## 2019-03-21 PROCEDURE — 99233 SBSQ HOSP IP/OBS HIGH 50: CPT | Performed by: HOSPITALIST

## 2019-03-21 PROCEDURE — 82962 GLUCOSE BLOOD TEST: CPT | Mod: 91

## 2019-03-21 PROCEDURE — 84100 ASSAY OF PHOSPHORUS: CPT

## 2019-03-21 PROCEDURE — 700117 HCHG RX CONTRAST REV CODE 255: Performed by: INTERNAL MEDICINE

## 2019-03-21 PROCEDURE — 700102 HCHG RX REV CODE 250 W/ 637 OVERRIDE(OP): Performed by: INTERNAL MEDICINE

## 2019-03-21 PROCEDURE — 85025 COMPLETE CBC W/AUTO DIFF WBC: CPT

## 2019-03-21 PROCEDURE — 700105 HCHG RX REV CODE 258: Performed by: HOSPITALIST

## 2019-03-21 PROCEDURE — 93010 ELECTROCARDIOGRAM REPORT: CPT | Performed by: INTERNAL MEDICINE

## 2019-03-21 PROCEDURE — 700111 HCHG RX REV CODE 636 W/ 250 OVERRIDE (IP): Performed by: HOSPITALIST

## 2019-03-21 PROCEDURE — 80048 BASIC METABOLIC PNL TOTAL CA: CPT | Mod: 91

## 2019-03-21 PROCEDURE — 99291 CRITICAL CARE FIRST HOUR: CPT | Performed by: INTERNAL MEDICINE

## 2019-03-21 PROCEDURE — 700101 HCHG RX REV CODE 250: Performed by: HOSPITALIST

## 2019-03-21 RX ORDER — SODIUM CHLORIDE 9 MG/ML
INJECTION, SOLUTION INTRAVENOUS
Status: ACTIVE
Start: 2019-03-21 | End: 2019-03-22

## 2019-03-21 RX ORDER — DEXTROSE, SODIUM CHLORIDE, SODIUM LACTATE, POTASSIUM CHLORIDE, AND CALCIUM CHLORIDE 5; .6; .31; .03; .02 G/100ML; G/100ML; G/100ML; G/100ML; G/100ML
INJECTION, SOLUTION INTRAVENOUS CONTINUOUS
Status: DISCONTINUED | OUTPATIENT
Start: 2019-03-21 | End: 2019-03-25

## 2019-03-21 RX ORDER — DEXTROSE MONOHYDRATE 25 G/50ML
12.5-25 INJECTION, SOLUTION INTRAVENOUS PRN
Status: DISCONTINUED | OUTPATIENT
Start: 2019-03-21 | End: 2019-03-26 | Stop reason: HOSPADM

## 2019-03-21 RX ORDER — DEXTROSE MONOHYDRATE 25 G/50ML
25 INJECTION, SOLUTION INTRAVENOUS
Status: DISCONTINUED | OUTPATIENT
Start: 2019-03-21 | End: 2019-03-21

## 2019-03-21 RX ORDER — POTASSIUM CHLORIDE 29.8 MG/ML
40 INJECTION INTRAVENOUS ONCE
Status: COMPLETED | OUTPATIENT
Start: 2019-03-21 | End: 2019-03-21

## 2019-03-21 RX ORDER — METOCLOPRAMIDE HYDROCHLORIDE 5 MG/ML
10 INJECTION INTRAMUSCULAR; INTRAVENOUS EVERY 8 HOURS PRN
Status: DISCONTINUED | OUTPATIENT
Start: 2019-03-21 | End: 2019-03-22

## 2019-03-21 RX ORDER — MAGNESIUM SULFATE HEPTAHYDRATE 40 MG/ML
2 INJECTION, SOLUTION INTRAVENOUS ONCE
Status: COMPLETED | OUTPATIENT
Start: 2019-03-21 | End: 2019-03-21

## 2019-03-21 RX ORDER — POTASSIUM CHLORIDE 14.9 MG/ML
20 INJECTION INTRAVENOUS ONCE
Status: COMPLETED | OUTPATIENT
Start: 2019-03-21 | End: 2019-03-21

## 2019-03-21 RX ORDER — SODIUM CHLORIDE 450 MG/100ML
INJECTION, SOLUTION INTRAVENOUS CONTINUOUS
Status: DISCONTINUED | OUTPATIENT
Start: 2019-03-21 | End: 2019-03-21

## 2019-03-21 RX ADMIN — PROCHLORPERAZINE EDISYLATE 10 MG: 5 INJECTION INTRAMUSCULAR; INTRAVENOUS at 01:57

## 2019-03-21 RX ADMIN — MORPHINE SULFATE 2 MG: 4 INJECTION INTRAVENOUS at 08:43

## 2019-03-21 RX ADMIN — METOCLOPRAMIDE 10 MG: 5 INJECTION, SOLUTION INTRAMUSCULAR; INTRAVENOUS at 20:38

## 2019-03-21 RX ADMIN — ONDANSETRON 4 MG: 2 INJECTION INTRAMUSCULAR; INTRAVENOUS at 21:51

## 2019-03-21 RX ADMIN — CEFTRIAXONE SODIUM 2 G: 2 INJECTION, POWDER, FOR SOLUTION INTRAMUSCULAR; INTRAVENOUS at 05:02

## 2019-03-21 RX ADMIN — SODIUM CHLORIDE, SODIUM LACTATE, POTASSIUM CHLORIDE, CALCIUM CHLORIDE AND DEXTROSE MONOHYDRATE: 5; 600; 310; 30; 20 INJECTION, SOLUTION INTRAVENOUS at 04:33

## 2019-03-21 RX ADMIN — METRONIDAZOLE 500 MG: 500 INJECTION, SOLUTION INTRAVENOUS at 05:04

## 2019-03-21 RX ADMIN — ONDANSETRON 4 MG: 2 INJECTION INTRAMUSCULAR; INTRAVENOUS at 15:46

## 2019-03-21 RX ADMIN — IOHEXOL 75 ML: 350 INJECTION, SOLUTION INTRAVENOUS at 09:41

## 2019-03-21 RX ADMIN — PROCHLORPERAZINE EDISYLATE 10 MG: 5 INJECTION INTRAMUSCULAR; INTRAVENOUS at 08:43

## 2019-03-21 RX ADMIN — MORPHINE SULFATE 2 MG: 4 INJECTION INTRAVENOUS at 17:30

## 2019-03-21 RX ADMIN — METRONIDAZOLE 500 MG: 500 INJECTION, SOLUTION INTRAVENOUS at 21:44

## 2019-03-21 RX ADMIN — METRONIDAZOLE 500 MG: 500 INJECTION, SOLUTION INTRAVENOUS at 14:30

## 2019-03-21 RX ADMIN — MAGNESIUM SULFATE IN WATER 2 G: 40 INJECTION, SOLUTION INTRAVENOUS at 12:27

## 2019-03-21 RX ADMIN — PROCHLORPERAZINE EDISYLATE 10 MG: 5 INJECTION INTRAMUSCULAR; INTRAVENOUS at 12:43

## 2019-03-21 RX ADMIN — ENOXAPARIN SODIUM 40 MG: 100 INJECTION SUBCUTANEOUS at 17:29

## 2019-03-21 RX ADMIN — POTASSIUM CHLORIDE 40 MEQ: 29.8 INJECTION, SOLUTION INTRAVENOUS at 03:45

## 2019-03-21 RX ADMIN — MORPHINE SULFATE 2 MG: 4 INJECTION INTRAVENOUS at 12:30

## 2019-03-21 RX ADMIN — POTASSIUM CHLORIDE 20 MEQ: 200 INJECTION, SOLUTION INTRAVENOUS at 18:46

## 2019-03-21 RX ADMIN — MORPHINE SULFATE 2 MG: 4 INJECTION INTRAVENOUS at 01:56

## 2019-03-21 RX ADMIN — SODIUM CHLORIDE, SODIUM LACTATE, POTASSIUM CHLORIDE, CALCIUM CHLORIDE AND DEXTROSE MONOHYDRATE: 5; 600; 310; 30; 20 INJECTION, SOLUTION INTRAVENOUS at 12:12

## 2019-03-21 RX ADMIN — POTASSIUM PHOSPHATE, MONOBASIC AND POTASSIUM PHOSPHATE, DIBASIC 30 MMOL: 224; 236 INJECTION, SOLUTION INTRAVENOUS at 12:12

## 2019-03-21 RX ADMIN — METOCLOPRAMIDE 10 MG: 5 INJECTION, SOLUTION INTRAMUSCULAR; INTRAVENOUS at 12:12

## 2019-03-21 RX ADMIN — SODIUM CHLORIDE 2 UNITS/HR: 9 INJECTION, SOLUTION INTRAVENOUS at 14:30

## 2019-03-21 RX ADMIN — DEXTROSE AND SODIUM CHLORIDE: 10; .45 INJECTION, SOLUTION INTRAVENOUS at 00:07

## 2019-03-21 RX ADMIN — MORPHINE SULFATE 2 MG: 4 INJECTION INTRAVENOUS at 21:51

## 2019-03-21 ASSESSMENT — ENCOUNTER SYMPTOMS
FLANK PAIN: 1
PALPITATIONS: 0
VOMITING: 1
SPUTUM PRODUCTION: 0
BLOOD IN STOOL: 0
ABDOMINAL PAIN: 0
HEADACHES: 0
MYALGIAS: 0
WEAKNESS: 0
COUGH: 0
BLURRED VISION: 0
SEIZURES: 0
NAUSEA: 1
DIARRHEA: 0
VOMITING: 0
BACK PAIN: 1
ABDOMINAL PAIN: 1
DIZZINESS: 0
CONSTIPATION: 0
LOSS OF CONSCIOUSNESS: 0
FEVER: 0
CHILLS: 0
FOCAL WEAKNESS: 0
SHORTNESS OF BREATH: 0

## 2019-03-21 NOTE — PROGRESS NOTES
Monitor Summary: -130's, .14/.06/.28    2 RN assessment completed, Pt has no areas concerning for pressure ulcers

## 2019-03-21 NOTE — PROGRESS NOTES
Spoke with lab to re-run mag and Phos off of 1330 labs as these were not dosed in ER. Will wait for K+ that was sent down at 1700 to see if Pt will require K+ phos or Na+ phos.

## 2019-03-21 NOTE — ASSESSMENT & PLAN NOTE
Small anterior mediastinal air unclear etiology at this time  CT chest with no signs of tracheal or esophageal injury.     If worsening pain or difficulty breathing get stat CXR to rule out worsening otherwise follow clinically

## 2019-03-21 NOTE — ASSESSMENT & PLAN NOTE
Likely DKA worsening baseline gastroparesis with ketosis  Reglan and antiemetics  Can also use low dose haldol    Will start home dose of Reglan IV to help with gastric empyting, will start zyprexa to see if relief of nausea    Mother asking about medication to help with her appetite as an outpatient and also pain. An antidepressant with side effect of weight gain an appetite might help this and control her pain better will have her discuss this with PCP.

## 2019-03-21 NOTE — ASSESSMENT & PLAN NOTE
CT chest with no findings or stigmata to suggest perforation as cause of anterior small pneumomediastinum

## 2019-03-21 NOTE — PROGRESS NOTES
Notified MD patient ready to transition from DKA protocol. Dr Guerra to collaborate with pharmacy for transition plan since pt will remain NPO.

## 2019-03-21 NOTE — ASSESSMENT & PLAN NOTE
CT chest with ? Trace left lower lobe pneumonia  Possible aspiration patient lays on left side with persistent vomiting.  Continue metronidazole/ceftriaxone  Will check procal -> stopped antibiotics pneumonitis

## 2019-03-21 NOTE — ASSESSMENT & PLAN NOTE
Reason for DKA: Vomiting/pain esophageal injury infection  -> left lower lobe pna  (rule out infection, AMI,CVA, medication, thiazide, beta blocker,antipsychotic, steroids, pregnancy, pancreatitis)  Follow Q 1 hour accuchecks, serial chemistry and aggressive replace K, PO4, Mg  Check serum osm, ketones, hgA1c, vbg/abg  Fluid resus NS/LR  Start long acting Insulin    Transition to critical care insulin gtt since still with protracted vomiting unable to tolerate PO's.   Avoid chloride replacement due to hyperchloremic state from resus.     Still on insulin drip improving nausea on schedule reglan and now adding zyprexa to see if that helps

## 2019-03-21 NOTE — PROGRESS NOTES
12 hour chart check     MS: -130s    .20/.08/.36      2 RN skin check complete  Devices in place: BP cuff, Femoral central line  Skin intact under devices  No concerning areas for pressure ulcers

## 2019-03-21 NOTE — PROGRESS NOTES
Spoke with Dr. Benz in regards tocmost recent BG of 98, BMP results that have just came back, and Pt's current status. Dr Benz placed orders to decrease insulin gtt to 4units/hr as Pt will not be able to eat soon d/t severe N/V.

## 2019-03-21 NOTE — PROGRESS NOTES
Critical Care Progress Note    Date of admission  3/20/2019    Chief Complaint  28 y.o. female who presented 3/20/2019 with DKA, gastroparesis that presented with one day of acute severe back pain. She mariano vomiting to this or feeling sick or missing diabetic medication. She present to ER found to have DKA HCO <5, BUN 30, cr 1.38 glucose of 332, urine with ketones, WBC of 22 with no bandemia she was given 2L of fluids and started on insulin gtt. She also had abdominal CT scan preformed for her uncontrolling vomiting and flank/back pain which found small amounts of mediastinal air. She was started on antibiotics ceftriaxone and metronidazole. Patient history is limited due to her acuity and continued retching when I see her. She describe that the pain came first not vomiting then pain in her back. Mariano fever chills or dysuria. She dose describe some pleuritic pain. Taken from Dr Benz.     Hospital Course  admit to icu 3/21   Interval Problem Update  Reviewed last 24 hour events:    Neuro: AOx4  HR: 110's  SBP: MAP 64-74  Tmax: afebrile  GI: npo persistent vomiting  UOP: 2500  Lines: peripherals  Resp: RA  Vte: lovenox  PPI/H2:n/a  Antibx: ceftriaxone/metronidazole  Insulin gtt  k replaced  D5LR at 125ml/hr          Review of Systems  Review of Systems   Constitutional: Negative for chills and fever.   HENT: Negative for congestion.    Respiratory: Negative for cough, sputum production and shortness of breath.    Cardiovascular: Positive for chest pain.   Gastrointestinal: Positive for abdominal pain, nausea and vomiting. Negative for blood in stool, constipation and diarrhea.   Genitourinary: Positive for flank pain. Negative for dysuria, frequency, hematuria and urgency.   Musculoskeletal: Positive for back pain.   Neurological: Negative for focal weakness, seizures, loss of consciousness, weakness and headaches.        Vital Signs for last 24 hours   Temp:  [35.8 °C (96.5 °F)-37.1 °C (98.8 °F)] 35.8 °C (96.5  °F)  Pulse:  [101-131] 108  Resp:  [13-34] 19  SpO2:  [93 %-99 %] 95 %    Hemodynamic parameters for last 24 hours       Respiratory Information for the last 24 hours       Physical Exam   Physical Exam   Constitutional: She is oriented to person, place, and time. She appears distressed.   Ill appearing uncomfortable with vomiting   HENT:   Head: Normocephalic and atraumatic.   Neck: No JVD present. No thyromegaly present.   Cardiovascular: Normal rate and regular rhythm.    No murmur heard.  Pulmonary/Chest: Effort normal and breath sounds normal. No respiratory distress. She has no wheezes. She has no rales.   No crepitus   Abdominal: She exhibits no distension. There is no tenderness. There is no rebound and no guarding.   Musculoskeletal: She exhibits edema. She exhibits no tenderness.   Neurological: She is alert and oriented to person, place, and time. No cranial nerve deficit.   Following commands, answers appropriately, moves all extremities   Skin: Skin is warm and dry. No erythema.   Psychiatric: She has a normal mood and affect.       Medications  Current Facility-Administered Medications   Medication Dose Route Frequency Provider Last Rate Last Dose   • D5LR infusion   Intravenous Continuous Hugo Benz M.D. 83 mL/hr at 03/21/19 1046     • insulin regular human (HUMULIN/NOVOLIN R) 62.5 Units in  mL infusion per protocol  0-29 Units/hr Intravenous Continuous PAULO Baez.O. 4 mL/hr at 03/21/19 1000 1 Units/hr at 03/21/19 1000   • dextrose 50% (D50W) injection 25-50 mL  12.5-25 g Intravenous PRN Michael Lincoln D.OHollis       • K+ Scale: Goal of 4.5  1 Each Intravenous Q6HRS Hugo Benz M.D.   1 Each at 03/21/19 1200   • metoclopramide (REGLAN) injection 10 mg  10 mg Intravenous Q8HRS PRN Hugo Benz M.D.       • potassium phosphates 30 mmol in D5W 500 mL ivpb  30 mmol Intravenous Once Hugo Benz M.D.       • acetaminophen (TYLENOL) tablet 650 mg  650 mg Oral Q6HRS PRN Max TRIPLETT  BRIGIDA Browning       • Pharmacy Consult Request ...Pain Management Review 1 Each  1 Each Other PHARMACY TO DOSE Max Browning M.D.        And   • oxyCODONE immediate-release (ROXICODONE) tablet 2.5 mg  2.5 mg Oral Q3HRS PRN Max Browning M.D.        And   • oxyCODONE immediate-release (ROXICODONE) tablet 5 mg  5 mg Oral Q3HRS PRN Max Browning M.D.        And   • morphine (pf) 4 mg/ml injection 2 mg  2 mg Intravenous Q3HRS PRN Max Browning M.D.   2 mg at 03/21/19 0843   • cloNIDine (CATAPRES) tablet 0.1 mg  0.1 mg Oral Q6HRS PRN Max Browning M.D.       • ondansetron (ZOFRAN) syringe/vial injection 4 mg  4 mg Intravenous Q4HRS PRN Max Browning M.D.   4 mg at 03/20/19 1623   • ondansetron (ZOFRAN ODT) dispertab 4 mg  4 mg Oral Q4HRS PRN Max Browning M.D.       • promethazine (PHENERGAN) tablet 12.5-25 mg  12.5-25 mg Oral Q4HRS PRRONALD Browning M.D.       • promethazine (PHENERGAN) suppository 12.5-25 mg  12.5-25 mg Rectal Q4HRS PRN Max Browning M.D.   25 mg at 03/20/19 1930   • prochlorperazine (COMPAZINE) injection 5-10 mg  5-10 mg Intravenous Q4HRS PRRONALD Browning M.D.   10 mg at 03/21/19 0843   • senna-docusate (PERICOLACE or SENOKOT S) 8.6-50 MG per tablet 2 Tab  2 Tab Oral BID Max Browning M.D.   Stopped at 03/20/19 0815    And   • polyethylene glycol/lytes (MIRALAX) PACKET 1 Packet  1 Packet Oral QDAY PRN Max Browning M.D.        And   • magnesium hydroxide (MILK OF MAGNESIA) suspension 30 mL  30 mL Oral QDAY PRN Max Browning M.D.        And   • bisacodyl (DULCOLAX) suppository 10 mg  10 mg Rectal QDAY PRN Max Browning M.D.       • enoxaparin (LOVENOX) inj 40 mg  40 mg Subcutaneous DAILY Max Browning M.D.   40 mg at 03/20/19 1753   • cefTRIAXone (ROCEPHIN) 2 g in  mL IVPB  2 g Intravenous Q24HRS Max Browning M.D.   Stopped at 03/21/19 0532   • metroNIDAZOLE (FLAGYL) IVPB 500 mg  500 mg Intravenous Q8HRS Max Browning M.D.   Stopped at 03/21/19 0604        Fluids    Intake/Output Summary (Last 24 hours) at 03/21/19 1111  Last data filed at 03/21/19 1000   Gross per 24 hour   Intake          8702.88 ml   Output             1510 ml   Net          7192.88 ml       Laboratory          Recent Labs      03/20/19   0856  03/20/19   1330   03/20/19   2216  03/21/19   0207  03/21/19   0618   SODIUM  143  145   < >  149*  147*  149*   POTASSIUM  4.6  3.3*   < >  3.0*  3.3*  3.4*   CHLORIDE  116*  120*   < >  121*  122*  122*   CO2  <5*  9*   < >  15*  18*  14*   BUN  30*  29*   < >  25*  25*  24*   CREATININE  1.36  1.25   < >  1.23  1.29  1.28   MAGNESIUM  1.9  1.6   --    --   1.8   --    PHOSPHORUS  3.8  1.1*   --    --   2.3*  2.1*   CALCIUM  7.7*  8.0*   < >  7.7*  7.9*  7.6*    < > = values in this interval not displayed.     Recent Labs      03/20/19 0229 03/20/19   0856   03/20/19 2216 03/21/19   0207  03/21/19   0618   ALTSGPT  11   --   11   --    --    --    --    ASTSGOT  10*   --   10*   --    --    --    --    ALKPHOSPHAT  134*   --   115*   --    --    --    --    TBILIRUBIN  0.4   --   0.3   --    --    --    --    GLUCOSE  332*   < >  350*   < >  125*  106*  160*    < > = values in this interval not displayed.     Recent Labs      03/20/19 0229 03/20/19   0856  03/21/19   0207   WBC  22.2*  18.7*  10.2   NEUTSPOLYS  85.00*  79.90*  78.60*   LYMPHOCYTES  7.50*  11.80*  9.40*   MONOCYTES  6.40  6.90  11.20   EOSINOPHILS  0.00  0.00  0.00   BASOPHILS  0.30  0.40  0.40   ASTSGOT  10*  10*   --    ALTSGPT  11  11   --    ALKPHOSPHAT  134*  115*   --    TBILIRUBIN  0.4  0.3   --      Recent Labs      03/20/19   0229  03/20/19   0856  03/21/19   0207   RBC  5.62*  5.13  4.24   HEMOGLOBIN  16.0  14.4  11.9*   HEMATOCRIT  48.4*  44.3  34.9*   PLATELETCT  312  251  203       Imaging  X-Ray:  I have personally reviewed the images and compared with prior images.  CT:    Reviewed    Prior NM gastric study with delayed emptying.      Assessment/Plan  Pneumonia- (present on admission)   Assessment & Plan    CT chest with ? Trace left lower lobe pneumonia  Possible aspiration patient lays on left side with persistent vomiting.  Continue metronidazole/ceftriaxone  Will check procal     DM (diabetes mellitus) type 1, uncontrolled, with ketoacidosis (HCC)- (present on admission)   Assessment & Plan    Reason for DKA: Vomiting/pain esophageal injury infection  -> left lower lobe pna  (rule out infection, AMI,CVA, medication, thiazide, beta blocker,antipsychotic, steroids, pregnancy, pancreatitis)  Follow Q 1 hour accuchecks, serial chemistry and aggressive replace K, PO4, Mg  Check serum osm, ketones, hgA1c, vbg/abg  Fluid resus NS/LR  Start long acting Insulin    Transition to critical care insulin gtt since still with protracted vomiting unable to tolerate PO's.   Avoid chloride replacement due to hyperchloremic state from resus.            Gastroparesis due to DM (HCC)- (present on admission)   Assessment & Plan    Likely DKA worsening baseline gastroparesis with ketosis  Reglan and antiemetics  Can also use low dose haldol    Will start home dose of Reglan IV to help with gastric empyting    Mother asking about medication to help with her appetite as an outpatient and also pain. An antidepressant with side effect of weight gain an appetite might help this and control her pain better will have her discuss this with PCP.      CHERELLE (acute kidney injury) (HCC)   Assessment & Plan    Likely related to dehydration  Avoid nephrotoxins  No hydro on ct scan 3/20       Pneumomediastinum (HCC)   Assessment & Plan    Small anterior mediastinal air unclear etiology at this time  CT chest with no signs of tracheal or esophageal injury.     If worsening pain or difficulty breathing get stat CXR to rule out worsening otherwise follow clinically     Esophageal rupture   Assessment & Plan    CT chest with no findings or stigmata to suggest perforation as cause  of anterior small pneumomediastinum          VTE:  Lovenox  Ulcer: Not Indicated  Lines: None    I have performed a physical exam and reviewed and updated ROS and Plan today (3/21/2019). In review of yesterday's note (3/20/2019), there are no changes except as documented above.     Discussed patient condition and risk of morbidity and/or mortality with Hospitalist, Family, RN, Pharmacy and Patient  The patient remains critically ill from diabetes requiring insulin gtt with titration.  Critical care time = 35 minutes in directly providing and coordinating critical care and extensive data review.  No time overlap and excludes procedures.

## 2019-03-21 NOTE — PROGRESS NOTES
Hospital Medicine Daily Progress Note    Date of Service  3/21/2019    Chief Complaint  28 y.o. female admitted 3/20/2019 with right flank pain    Hospital Course    28 y.o. female with history of type I diabetes mellitus with multiple prior episodes of DKA, dyslipidemia, was in her usual state of health until a few days prior to admission when she began to have flank pain on the right side, associated with nausea and near intractable vomiting.  She was found again to be in DKA       Interval Problem Update  AOx4  's  SBP: MAP 64-74  Afeb  Continues vomiting  UOP 2500  PIVs  RA  On ceftriaxone/metronidazole  Insulin gtt  K replaced  D5LR at 125ml/hr    Consultants/Specialty  Intensivist    Code Status  FULL    Disposition  Keep in ICU on insulin gtt    D/W tx team on rounds      Review of Systems  Review of Systems   Constitutional: Positive for malaise/fatigue. Negative for chills and fever.   Eyes: Negative for blurred vision.   Respiratory: Negative for cough and shortness of breath.    Cardiovascular: Negative for chest pain and palpitations.   Gastrointestinal: Positive for nausea (persistent). Negative for abdominal pain and vomiting.   Genitourinary: Negative for dysuria.   Musculoskeletal: Negative for myalgias.   Neurological: Negative for dizziness and headaches.   All other systems reviewed and are negative.       Physical Exam  Temp:  [35.8 °C (96.5 °F)-37.1 °C (98.8 °F)] 35.8 °C (96.5 °F)  Pulse:  [101-144] 109  Resp:  [13-34] 13  SpO2:  [93 %-100 %] 95 %    Physical Exam   Constitutional: She is oriented to person, place, and time. She appears well-developed and well-nourished.   HENT:   Head: Normocephalic and atraumatic.   Cardiovascular: Regular rhythm and normal heart sounds.    No murmur heard.  tachy   Pulmonary/Chest: Effort normal and breath sounds normal. No respiratory distress. She has no wheezes. She has no rales.   Abdominal: Soft. Bowel sounds are normal. She exhibits no distension.  There is no tenderness.   Musculoskeletal: Normal range of motion. She exhibits no edema.   Neurological: She is alert and oriented to person, place, and time.   Skin: Skin is warm and dry. No erythema.   Psychiatric:   Very flat affect   Nursing note and vitals reviewed.      Fluids    Intake/Output Summary (Last 24 hours) at 03/21/19 0821  Last data filed at 03/21/19 0800   Gross per 24 hour   Intake          8444.88 ml   Output             2750 ml   Net          5694.88 ml       Laboratory  Recent Labs      03/20/19   0229  03/20/19   0856  03/21/19   0207   WBC  22.2*  18.7*  10.2   RBC  5.62*  5.13  4.24   HEMOGLOBIN  16.0  14.4  11.9*   HEMATOCRIT  48.4*  44.3  34.9*   MCV  86.1  86.4  82.3   MCH  28.5  28.1  28.1   MCHC  33.1*  32.5*  34.1   RDW  43.6  44.3  41.8   PLATELETCT  312  251  203   MPV  10.8  10.2  10.3     Recent Labs      03/20/19   2216  03/21/19   0207  03/21/19   0618   SODIUM  149*  147*  149*   POTASSIUM  3.0*  3.3*  3.4*   CHLORIDE  121*  122*  122*   CO2  15*  18*  14*   GLUCOSE  125*  106*  160*   BUN  25*  25*  24*   CREATININE  1.23  1.29  1.28   CALCIUM  7.7*  7.9*  7.6*                   Imaging  CT-CHEST (THORAX) WITH   Final Result         1. Small, predominantly anterior pneumomediastinum, etiology uncertain. No air adjacent to trachea or esophagus to suggest tracheal/esophageal injury.   2. Ill-defined opacities in the dependent left lower lobe, atelectasis versus early evolving consolidations. Early pneumonia may have this appearance.   3. Trace perisplenic ascites.               DX-CHEST-PORTABLE (1 VIEW)   Final Result         1.  Hazy pulmonary opacities suggests subtle infiltrate.      CT-RENAL COLIC EVALUATION(A/P W/O)   Final Result         1.  Mediastinal air anteriorly, of uncertain etiology. Correlate with history, additional workup as clinically appropriate.   2.  Markedly distended bladder, consider bladder atony or bladder outlet obstruction as clinically appropriate.  Incidentally catheter placement.      DX-CHEST-PORTABLE (1 VIEW)   Final Result         1.  No acute cardiopulmonary disease.           Assessment/Plan  * DKA (diabetic ketoacidoses) (HCC)- (present on admission)   Assessment & Plan    Still on insulin drip  Bicarb 14, gap still open  Continue checking bmps; remain on insulin drip     Pneumonia- (present on admission)   Assessment & Plan    C3/flagyl  No esophageal rupture; ? Need for abx     DM (diabetes mellitus) type 1, uncontrolled, with ketoacidosis (HCC)- (present on admission)   Assessment & Plan    Will need ongoing education       Gastroparesis due to DM (HCC)- (present on admission)   Assessment & Plan    ?cause of vomiting     CHERELLE (acute kidney injury) (HCC)- (present on admission)   Assessment & Plan    Prerenal; resolved     Pneumomediastinum (HCC)- (present on admission)   Assessment & Plan    Not esophageal rupture, likely just from wretching     Dyslipidemia- (present on admission)   Assessment & Plan    Continue statin therapy           VTE prophylaxis: lovenox

## 2019-03-21 NOTE — CARE PLAN
Problem: Safety  Goal: Will remain free from injury  Outcome: PROGRESSING AS EXPECTED  Pt remains free from falls and injuries, appropriate safety and fall precautions in place. Educated patient to call for assistance, fall protocols, and safety measures.     Problem: Pain Management  Goal: Pain level will decrease to patient's comfort goal  Outcome: PROGRESSING SLOWER THAN EXPECTED  Pt continues to have bouts of right flank pain that she scores 9-10/10. Providing pain meds PRN. Educated patient on non-pharmacological pain relief techniques as well.

## 2019-03-22 PROBLEM — R93.89 ABNORMAL CHEST X-RAY: Status: ACTIVE | Noted: 2018-12-21

## 2019-03-22 LAB
ANION GAP SERPL CALC-SCNC: 6 MMOL/L (ref 0–11.9)
ANION GAP SERPL CALC-SCNC: 7 MMOL/L (ref 0–11.9)
ANION GAP SERPL CALC-SCNC: 9 MMOL/L (ref 0–11.9)
ANION GAP SERPL CALC-SCNC: 9 MMOL/L (ref 0–11.9)
BUN SERPL-MCNC: 11 MG/DL (ref 8–22)
BUN SERPL-MCNC: 13 MG/DL (ref 8–22)
BUN SERPL-MCNC: 16 MG/DL (ref 8–22)
BUN SERPL-MCNC: 9 MG/DL (ref 8–22)
CALCIUM SERPL-MCNC: 8 MG/DL (ref 8.5–10.5)
CALCIUM SERPL-MCNC: 8.2 MG/DL (ref 8.5–10.5)
CHLORIDE SERPL-SCNC: 116 MMOL/L (ref 96–112)
CHLORIDE SERPL-SCNC: 118 MMOL/L (ref 96–112)
CHLORIDE SERPL-SCNC: 119 MMOL/L (ref 96–112)
CHLORIDE SERPL-SCNC: 119 MMOL/L (ref 96–112)
CO2 SERPL-SCNC: 19 MMOL/L (ref 20–33)
CO2 SERPL-SCNC: 21 MMOL/L (ref 20–33)
CO2 SERPL-SCNC: 23 MMOL/L (ref 20–33)
CO2 SERPL-SCNC: 25 MMOL/L (ref 20–33)
CREAT SERPL-MCNC: 0.83 MG/DL (ref 0.5–1.4)
CREAT SERPL-MCNC: 0.9 MG/DL (ref 0.5–1.4)
CREAT SERPL-MCNC: 0.99 MG/DL (ref 0.5–1.4)
CREAT SERPL-MCNC: 1.06 MG/DL (ref 0.5–1.4)
GLUCOSE BLD-MCNC: 116 MG/DL (ref 65–99)
GLUCOSE BLD-MCNC: 130 MG/DL (ref 65–99)
GLUCOSE BLD-MCNC: 140 MG/DL (ref 65–99)
GLUCOSE BLD-MCNC: 141 MG/DL (ref 65–99)
GLUCOSE BLD-MCNC: 156 MG/DL (ref 65–99)
GLUCOSE BLD-MCNC: 157 MG/DL (ref 65–99)
GLUCOSE BLD-MCNC: 166 MG/DL (ref 65–99)
GLUCOSE BLD-MCNC: 168 MG/DL (ref 65–99)
GLUCOSE BLD-MCNC: 169 MG/DL (ref 65–99)
GLUCOSE BLD-MCNC: 170 MG/DL (ref 65–99)
GLUCOSE BLD-MCNC: 173 MG/DL (ref 65–99)
GLUCOSE BLD-MCNC: 176 MG/DL (ref 65–99)
GLUCOSE BLD-MCNC: 179 MG/DL (ref 65–99)
GLUCOSE BLD-MCNC: 179 MG/DL (ref 65–99)
GLUCOSE BLD-MCNC: 180 MG/DL (ref 65–99)
GLUCOSE BLD-MCNC: 183 MG/DL (ref 65–99)
GLUCOSE BLD-MCNC: 192 MG/DL (ref 65–99)
GLUCOSE BLD-MCNC: 194 MG/DL (ref 65–99)
GLUCOSE BLD-MCNC: 198 MG/DL (ref 65–99)
GLUCOSE SERPL-MCNC: 152 MG/DL (ref 65–99)
GLUCOSE SERPL-MCNC: 198 MG/DL (ref 65–99)
GLUCOSE SERPL-MCNC: 200 MG/DL (ref 65–99)
GLUCOSE SERPL-MCNC: 206 MG/DL (ref 65–99)
MAGNESIUM SERPL-MCNC: 2.1 MG/DL (ref 1.5–2.5)
PHOSPHATE SERPL-MCNC: 2.5 MG/DL (ref 2.5–4.5)
POTASSIUM SERPL-SCNC: 3.1 MMOL/L (ref 3.6–5.5)
SODIUM SERPL-SCNC: 146 MMOL/L (ref 135–145)
SODIUM SERPL-SCNC: 147 MMOL/L (ref 135–145)
SODIUM SERPL-SCNC: 148 MMOL/L (ref 135–145)
SODIUM SERPL-SCNC: 150 MMOL/L (ref 135–145)

## 2019-03-22 PROCEDURE — 82962 GLUCOSE BLOOD TEST: CPT

## 2019-03-22 PROCEDURE — 700111 HCHG RX REV CODE 636 W/ 250 OVERRIDE (IP): Performed by: HOSPITALIST

## 2019-03-22 PROCEDURE — 700105 HCHG RX REV CODE 258: Performed by: HOSPITALIST

## 2019-03-22 PROCEDURE — 84100 ASSAY OF PHOSPHORUS: CPT

## 2019-03-22 PROCEDURE — 99291 CRITICAL CARE FIRST HOUR: CPT | Performed by: INTERNAL MEDICINE

## 2019-03-22 PROCEDURE — 700101 HCHG RX REV CODE 250: Performed by: HOSPITALIST

## 2019-03-22 PROCEDURE — 700105 HCHG RX REV CODE 258: Performed by: INTERNAL MEDICINE

## 2019-03-22 PROCEDURE — 770022 HCHG ROOM/CARE - ICU (200)

## 2019-03-22 PROCEDURE — 700111 HCHG RX REV CODE 636 W/ 250 OVERRIDE (IP): Performed by: INTERNAL MEDICINE

## 2019-03-22 PROCEDURE — 83735 ASSAY OF MAGNESIUM: CPT

## 2019-03-22 PROCEDURE — 99233 SBSQ HOSP IP/OBS HIGH 50: CPT | Performed by: HOSPITALIST

## 2019-03-22 PROCEDURE — 80048 BASIC METABOLIC PNL TOTAL CA: CPT

## 2019-03-22 RX ORDER — POTASSIUM CHLORIDE 14.9 MG/ML
20 INJECTION INTRAVENOUS ONCE
Status: COMPLETED | OUTPATIENT
Start: 2019-03-22 | End: 2019-03-22

## 2019-03-22 RX ORDER — METOCLOPRAMIDE HYDROCHLORIDE 5 MG/ML
10 INJECTION INTRAMUSCULAR; INTRAVENOUS EVERY 8 HOURS
Status: DISCONTINUED | OUTPATIENT
Start: 2019-03-22 | End: 2019-03-25

## 2019-03-22 RX ORDER — POTASSIUM CHLORIDE 7.45 MG/ML
10 INJECTION INTRAVENOUS ONCE
Status: COMPLETED | OUTPATIENT
Start: 2019-03-22 | End: 2019-03-22

## 2019-03-22 RX ADMIN — ONDANSETRON 4 MG: 2 INJECTION INTRAMUSCULAR; INTRAVENOUS at 02:43

## 2019-03-22 RX ADMIN — ONDANSETRON 4 MG: 2 INJECTION INTRAMUSCULAR; INTRAVENOUS at 09:32

## 2019-03-22 RX ADMIN — METOCLOPRAMIDE 10 MG: 5 INJECTION, SOLUTION INTRAMUSCULAR; INTRAVENOUS at 13:06

## 2019-03-22 RX ADMIN — METOCLOPRAMIDE 10 MG: 5 INJECTION, SOLUTION INTRAMUSCULAR; INTRAVENOUS at 05:47

## 2019-03-22 RX ADMIN — MORPHINE SULFATE 2 MG: 4 INJECTION INTRAVENOUS at 20:23

## 2019-03-22 RX ADMIN — POTASSIUM CHLORIDE 20 MEQ: 200 INJECTION, SOLUTION INTRAVENOUS at 01:36

## 2019-03-22 RX ADMIN — FAMOTIDINE 20 MG: 10 INJECTION INTRAVENOUS at 16:58

## 2019-03-22 RX ADMIN — MORPHINE SULFATE 2 MG: 4 INJECTION INTRAVENOUS at 01:34

## 2019-03-22 RX ADMIN — POTASSIUM CHLORIDE 20 MEQ: 200 INJECTION, SOLUTION INTRAVENOUS at 12:25

## 2019-03-22 RX ADMIN — MORPHINE SULFATE 2 MG: 4 INJECTION INTRAVENOUS at 09:32

## 2019-03-22 RX ADMIN — SODIUM CHLORIDE, SODIUM LACTATE, POTASSIUM CHLORIDE, CALCIUM CHLORIDE AND DEXTROSE MONOHYDRATE: 5; 600; 310; 30; 20 INJECTION, SOLUTION INTRAVENOUS at 02:57

## 2019-03-22 RX ADMIN — POTASSIUM CHLORIDE 10 MEQ: 7.46 INJECTION, SOLUTION INTRAVENOUS at 21:35

## 2019-03-22 RX ADMIN — FAMOTIDINE 20 MG: 10 INJECTION INTRAVENOUS at 10:08

## 2019-03-22 RX ADMIN — POTASSIUM CHLORIDE 20 MEQ: 14.9 INJECTION, SOLUTION INTRAVENOUS at 18:41

## 2019-03-22 RX ADMIN — MORPHINE SULFATE 2 MG: 4 INJECTION INTRAVENOUS at 16:58

## 2019-03-22 RX ADMIN — PROCHLORPERAZINE EDISYLATE 10 MG: 5 INJECTION INTRAMUSCULAR; INTRAVENOUS at 01:34

## 2019-03-22 RX ADMIN — POTASSIUM CHLORIDE 10 MEQ: 7.46 INJECTION, SOLUTION INTRAVENOUS at 03:39

## 2019-03-22 RX ADMIN — METRONIDAZOLE 500 MG: 500 INJECTION, SOLUTION INTRAVENOUS at 05:47

## 2019-03-22 RX ADMIN — METOCLOPRAMIDE 10 MG: 5 INJECTION, SOLUTION INTRAMUSCULAR; INTRAVENOUS at 22:38

## 2019-03-22 RX ADMIN — POTASSIUM CHLORIDE 10 MEQ: 7.46 INJECTION, SOLUTION INTRAVENOUS at 14:42

## 2019-03-22 RX ADMIN — ENOXAPARIN SODIUM 40 MG: 100 INJECTION SUBCUTANEOUS at 16:58

## 2019-03-22 RX ADMIN — SODIUM CHLORIDE, SODIUM LACTATE, POTASSIUM CHLORIDE, CALCIUM CHLORIDE AND DEXTROSE MONOHYDRATE: 5; 600; 310; 30; 20 INJECTION, SOLUTION INTRAVENOUS at 17:37

## 2019-03-22 RX ADMIN — CEFTRIAXONE SODIUM 2 G: 2 INJECTION, POWDER, FOR SOLUTION INTRAMUSCULAR; INTRAVENOUS at 05:47

## 2019-03-22 RX ADMIN — POTASSIUM CHLORIDE 10 MEQ: 7.46 INJECTION, SOLUTION INTRAVENOUS at 08:14

## 2019-03-22 RX ADMIN — POTASSIUM CHLORIDE 20 MEQ: 200 INJECTION, SOLUTION INTRAVENOUS at 07:08

## 2019-03-22 ASSESSMENT — ENCOUNTER SYMPTOMS
CONSTIPATION: 0
FLANK PAIN: 0
FOCAL WEAKNESS: 0
FEVER: 0
NECK PAIN: 0
NAUSEA: 1
BLOOD IN STOOL: 0
SPUTUM PRODUCTION: 0
ABDOMINAL PAIN: 1
LOSS OF CONSCIOUSNESS: 0
HEMOPTYSIS: 0
WEAKNESS: 0
VOMITING: 1
SHORTNESS OF BREATH: 0
PALPITATIONS: 0
COUGH: 1
BACK PAIN: 0
CHILLS: 0
COUGH: 0
SEIZURES: 0
DIARRHEA: 0
FLANK PAIN: 1
HEADACHES: 0

## 2019-03-22 NOTE — CARE PLAN
Problem: Pain Management  Goal: Pain level will decrease to patient's comfort goal  Outcome: PROGRESSING AS EXPECTED  Pt.'s pain controlled with current PRN medication.

## 2019-03-22 NOTE — CARE PLAN
Problem: Infection  Goal: Will remain free from infection    Intervention: Assess signs and symptoms of infection  Pt at increased risk of infection due to presence of invasive lines and devices. Interventions in place. Pt receiving IV antibiotics per MD order.      Problem: Pain Management  Goal: Pain level will decrease to patient's comfort goal    Intervention: Follow pain managment plan developed in collaboration with patient and Interdisciplinary Team  Pt with acute pain to R abdomen/flank area. Pt medicated according to MAR and alternative methods for pain control also explored with pt

## 2019-03-22 NOTE — PROGRESS NOTES
Critical Care Progress Note    Date of admission  3/20/2019    Chief Complaint  28 y.o. female who presented 3/20/2019 with DKA, gastroparesis that presented with one day of acute severe back pain. She mariano vomiting to this or feeling sick or missing diabetic medication. She present to ER found to have DKA HCO <5, BUN 30, cr 1.38 glucose of 332, urine with ketones, WBC of 22 with no bandemia she was given 2L of fluids and started on insulin gtt. She also had abdominal CT scan preformed for her uncontrolling vomiting and flank/back pain which found small amounts of mediastinal air. She was started on antibiotics ceftriaxone and metronidazole. Patient history is limited due to her acuity and continued retching when I see her. She describe that the pain came first not vomiting then pain in her back. Mariano fever chills or dysuria. She dose describe some pleuritic pain. Taken from Dr Benz.     Hospital Course  admit to icu 3/21   Interval Problem Update  Reviewed last 24 hour events:    Neuro: ao4 nausea with pain vomiting  HR:   SBP: 110-160's  Tmax: afebrile  GI: npo  UOP: 900  Lines: right femoral triple  Resp: room air 1l   Vte: lovenox  PPI/H2:no gi   Antibx: d/c today  k 3.1  Na 146    Schedule reglan and maybe give haldol in addition.     Review of Systems  Review of Systems   Constitutional: Negative for chills and fever.   Respiratory: Negative for cough and shortness of breath.    Cardiovascular: Negative for chest pain.   Gastrointestinal: Positive for abdominal pain, nausea and vomiting. Negative for blood in stool, constipation and diarrhea.   Genitourinary: Negative for dysuria, flank pain, frequency, hematuria and urgency.   Musculoskeletal: Negative for back pain.   Neurological: Negative for focal weakness, seizures, loss of consciousness, weakness and headaches.        Vital Signs for last 24 hours   Temp:  [35.8 °C (96.4 °F)-36 °C (96.8 °F)] 36 °C (96.8 °F)  Pulse:  [] 84  Resp:   [13-24] 16  SpO2:  [89 %-98 %] 98 %    Hemodynamic parameters for last 24 hours       Respiratory Information for the last 24 hours       Physical Exam   Physical Exam   Constitutional: She is oriented to person, place, and time. No distress.   Sleeping comfortable arousalable post reglan   HENT:   Head: Normocephalic and atraumatic.   Neck: No JVD present. No thyromegaly present.   Cardiovascular: Normal rate and regular rhythm.    No murmur heard.  Pulmonary/Chest: Effort normal and breath sounds normal. No respiratory distress. She has no wheezes. She has no rales.   No crepitus   Abdominal: She exhibits no distension. There is no tenderness. There is no rebound and no guarding.   Musculoskeletal: She exhibits edema. She exhibits no tenderness.   Neurological: She is alert and oriented to person, place, and time. No cranial nerve deficit.   Following commands, answers appropriately, moves all extremities, sleepy today   Skin: Skin is warm and dry. No erythema.   Psychiatric: She has a normal mood and affect.       Medications  Current Facility-Administered Medications   Medication Dose Route Frequency Provider Last Rate Last Dose   • metoclopramide (REGLAN) injection 10 mg  10 mg Intravenous Q8HR Hugo Benz M.D.   10 mg at 03/22/19 1306   • famotidine (PEPCID) injection 20 mg  20 mg Intravenous BID Hugo Benz M.D.   20 mg at 03/22/19 1008   • potassium chloride (KCL) ivpb 10 mEq  10 mEq Intravenous Once Hugo Benz M.D. 100 mL/hr at 03/22/19 1442 10 mEq at 03/22/19 1442   • D5LR infusion   Intravenous Continuous Hugo Benz M.D. 50 mL/hr at 03/22/19 0753     • insulin regular human (HUMULIN/NOVOLIN R) 62.5 Units in  mL infusion per protocol  0-29 Units/hr Intravenous Continuous Michael Lincoln D.O. 10 mL/hr at 03/22/19 1324 2.5 Units/hr at 03/22/19 1324   • dextrose 50% (D50W) injection 25-50 mL  12.5-25 g Intravenous PRN PAULO Baez.[email or web address]       • K+ Scale: Goal of 4.5  1 Each  Intravenous Q6HRS Hugo Benz M.D.   1 Each at 03/22/19 1200   • acetaminophen (TYLENOL) tablet 650 mg  650 mg Oral Q6HRS PRN Max Browning M.D.       • Pharmacy Consult Request ...Pain Management Review 1 Each  1 Each Other PHARMACY TO DOSE Max Browning M.D.        And   • oxyCODONE immediate-release (ROXICODONE) tablet 2.5 mg  2.5 mg Oral Q3HRS PRN Max Browning M.D.        And   • oxyCODONE immediate-release (ROXICODONE) tablet 5 mg  5 mg Oral Q3HRS PRN Max Browning M.D.        And   • morphine (pf) 4 mg/ml injection 2 mg  2 mg Intravenous Q3HRS PRN Max Browning M.D.   2 mg at 03/22/19 0932   • cloNIDine (CATAPRES) tablet 0.1 mg  0.1 mg Oral Q6HRS PRN Max Browning M.D.       • ondansetron (ZOFRAN) syringe/vial injection 4 mg  4 mg Intravenous Q4HRS PRRONALD Browning M.D.   4 mg at 03/22/19 0932   • ondansetron (ZOFRAN ODT) dispertab 4 mg  4 mg Oral Q4HRS PRN Max Browning M.D.       • promethazine (PHENERGAN) tablet 12.5-25 mg  12.5-25 mg Oral Q4HRS PRRONALD Browning M.D.       • promethazine (PHENERGAN) suppository 12.5-25 mg  12.5-25 mg Rectal Q4HRS PRRONALD Browning M.D.   25 mg at 03/20/19 1930   • prochlorperazine (COMPAZINE) injection 5-10 mg  5-10 mg Intravenous Q4HRS PRRONALD Browning M.D.   10 mg at 03/22/19 0134   • senna-docusate (PERICOLACE or SENOKOT S) 8.6-50 MG per tablet 2 Tab  2 Tab Oral BID Max Browning M.D.   Stopped at 03/20/19 0815    And   • polyethylene glycol/lytes (MIRALAX) PACKET 1 Packet  1 Packet Oral QDAY PRN Max Browning M.D.        And   • magnesium hydroxide (MILK OF MAGNESIA) suspension 30 mL  30 mL Oral QDAY PRN Max Browning M.D.        And   • bisacodyl (DULCOLAX) suppository 10 mg  10 mg Rectal QDAY PRN Max Browning M.D.       • enoxaparin (LOVENOX) inj 40 mg  40 mg Subcutaneous DAILY Max Browning M.D.   40 mg at 03/21/19 1729       Fluids    Intake/Output Summary (Last 24 hours) at 03/22/19 1519  Last data filed at 03/22/19 1400    Gross per 24 hour   Intake          3005.72 ml   Output             2200 ml   Net           805.72 ml       Laboratory          Recent Labs      03/20/19   1330   03/21/19   0207  03/21/19   0618   03/21/19   2342  03/22/19   0550  03/22/19   1122   SODIUM  145   < >  147*  149*   < >  147*  146*  150*   POTASSIUM  3.3*   < >  3.3*  3.4*   < >  3.1*  3.1*  3.1*   CHLORIDE  120*   < >  122*  122*   < >  119*  119*  118*   CO2  9*   < >  18*  14*   < >  19*  21  23   BUN  29*   < >  25*  24*   < >  16  13  11   CREATININE  1.25   < >  1.29  1.28   < >  1.06  0.99  0.90   MAGNESIUM  1.6   --   1.8   --    --    --   2.1   --    PHOSPHORUS  1.1*   --   2.3*  2.1*   --    --   2.5   --    CALCIUM  8.0*   < >  7.9*  7.6*   < >  8.0*  8.2*  8.2*    < > = values in this interval not displayed.     Recent Labs      03/20/19   0229   03/20/19   0856   03/21/19   2342  03/22/19   0550  03/22/19   1122   ALTSGPT  11   --   11   --    --    --    --    ASTSGOT  10*   --   10*   --    --    --    --    ALKPHOSPHAT  134*   --   115*   --    --    --    --    TBILIRUBIN  0.4   --   0.3   --    --    --    --    GLUCOSE  332*   < >  350*   < >  198*  206*  200*    < > = values in this interval not displayed.     Recent Labs      03/20/19   0229  03/20/19   0856  03/21/19   0207   WBC  22.2*  18.7*  10.2   NEUTSPOLYS  85.00*  79.90*  78.60*   LYMPHOCYTES  7.50*  11.80*  9.40*   MONOCYTES  6.40  6.90  11.20   EOSINOPHILS  0.00  0.00  0.00   BASOPHILS  0.30  0.40  0.40   ASTSGOT  10*  10*   --    ALTSGPT  11  11   --    ALKPHOSPHAT  134*  115*   --    TBILIRUBIN  0.4  0.3   --      Recent Labs      03/20/19   0229  03/20/19   0856  03/21/19   0207   RBC  5.62*  5.13  4.24   HEMOGLOBIN  16.0  14.4  11.9*   HEMATOCRIT  48.4*  44.3  34.9*   PLATELETCT  312  251  203       Imaging  X-Ray:  I have personally reviewed the images and compared with prior images.  CT:    Reviewed    Prior NM gastric study with delayed emptying.      Assessment/Plan  DM (diabetes mellitus) type 1, uncontrolled, with ketoacidosis (HCC)- (present on admission)   Assessment & Plan    Reason for DKA: Vomiting/pain esophageal injury infection  -> left lower lobe pna  (rule out infection, AMI,CVA, medication, thiazide, beta blocker,antipsychotic, steroids, pregnancy, pancreatitis)  Follow Q 1 hour accuchecks, serial chemistry and aggressive replace K, PO4, Mg  Check serum osm, ketones, hgA1c, vbg/abg  Fluid resus NS/LR  Start long acting Insulin    Transition to critical care insulin gtt since still with protracted vomiting unable to tolerate PO's.   Avoid chloride replacement due to hyperchloremic state from resus.     Still on insulin drip improving nausea           Abnormal chest x-ray- (present on admission)   Assessment & Plan    CT chest with ? Trace left lower lobe pneumonia  Possible aspiration patient lays on left side with persistent vomiting.  Continue metronidazole/ceftriaxone  Will check procal -> stopped antibiotics pneumonitis     Gastroparesis due to DM (HCC)- (present on admission)   Assessment & Plan    Likely DKA worsening baseline gastroparesis with ketosis  Reglan and antiemetics  Can also use low dose haldol    Will start home dose of Reglan IV to help with gastric empyting    Mother asking about medication to help with her appetite as an outpatient and also pain. An antidepressant with side effect of weight gain an appetite might help this and control her pain better will have her discuss this with PCP.      CHERELLE (acute kidney injury) (HCC)- (present on admission)   Assessment & Plan    Likely related to dehydration  Avoid nephrotoxins  No hydro on ct scan 3/20       Pneumomediastinum (HCC)- (present on admission)   Assessment & Plan    Small anterior mediastinal air unclear etiology at this time  CT chest with no signs of tracheal or esophageal injury.     If worsening pain or difficulty breathing get stat CXR to rule out worsening otherwise  follow clinically          VTE:  Lovenox  Ulcer: Not Indicated  Lines: None    I have performed a physical exam and reviewed and updated ROS and Plan today (3/22/2019). In review of yesterday's note (3/21/2019), there are no changes except as documented above.     Discussed patient condition and risk of morbidity and/or mortality with Hospitalist, Family, RN, Pharmacy and Patient  The patient remains critically ill from gastroparesis and diabetes on insulin drip with active titration.  Critical care time = 32 minutes in directly providing and coordinating critical care and extensive data review.  No time overlap and excludes procedures.

## 2019-03-22 NOTE — PROGRESS NOTES
· 2 RN skin check complete with Night shift, RN.  · Devices in place bp cables, femoral line  · Skin assessed under devices yes.  · Confirmed pressure ulcers found on NA.  · New potential pressure ulcers noted on NA.   · The following interventions in place patient able to turn q2h, up with 1 assist, pillows used to alleviate pressure.   Elbows dry/flaky, scabs to ravi legs, sacrum blanchable.

## 2019-03-22 NOTE — ASSESSMENT & PLAN NOTE
Severe nausea and vomiting uncontrolled  Slow to respond, a little better today, advance diet, change to PO reglan  Nightly zyprexa

## 2019-03-22 NOTE — PROGRESS NOTES
[facility] Medicine Daily Progress Note    Date of Service  3/22/2019    Chief Complaint  28 y.o. female admitted 3/20/2019 with right flank pain.    Hospital Course    Ms Sparks has a history of insulin dependent diabetes.  She is frequently admitted for DKA.  She presented to the ER on 3/20/19 with right flank pain and nausea/vomiting.  The patient was found to be in DKA and was admitted to the ICU, she was admitted to the ICU      Interval Problem Update  Remains on insulin drip, POC glucose 146-194  Nauseated, small emesis, no improvement from yesterday  Complains of generalized abdominal discomfort, flank pain improved    Consultants/Specialty  Critical Care, I discussed the patient's condition with Dr. Benz today on ICU Rounds    Code Status  Full Code    Disposition  TBD, ICU for now    Review of Systems  Review of Systems   Respiratory: Positive for cough. Negative for hemoptysis, sputum production and shortness of breath.    Cardiovascular: Negative for chest pain and palpitations.   Gastrointestinal: Positive for abdominal pain, nausea and vomiting. Negative for blood in stool and constipation.   Genitourinary: Positive for flank pain. Negative for dysuria and urgency.   Musculoskeletal: Negative for back pain and neck pain.   Skin: Negative for itching and rash.        Physical Exam  Temp:  [35.8 °C (96.4 °F)-36 °C (96.8 °F)] 35.9 °C (96.6 °F)  Pulse:  [] 97  Resp:  [13-24] 24  SpO2:  [89 %-96 %] 96 %    Physical Exam   Constitutional: She is oriented to person, place, and time. She appears well-developed and well-nourished.   HENT:   Head: Normocephalic and atraumatic.   Eyes: Conjunctivae and EOM are normal. Right eye exhibits no discharge. Left eye exhibits no discharge.   Cardiovascular: Normal rate, regular rhythm and intact distal pulses.    No murmur heard.  2+ Radial Pulses  Brisk Capillary Refill   Pulmonary/Chest: Effort normal and breath sounds normal. No respiratory distress. She has  no wheezes.   Abdominal: Soft. Bowel sounds are normal. She exhibits no distension. There is tenderness. There is no rebound and no guarding.   Musculoskeletal: Normal range of motion. She exhibits no edema.   Neurological: She is alert and oriented to person, place, and time. No cranial nerve deficit.   Skin: Skin is warm and dry. She is not diaphoretic. No erythema.   Skin is warm and well perfused   Psychiatric: She has a normal mood and affect.       Fluids    Intake/Output Summary (Last 24 hours) at 03/22/19 0951  Last data filed at 03/22/19 0800   Gross per 24 hour   Intake          3284.91 ml   Output             2420 ml   Net           864.91 ml       Laboratory  Recent Labs      03/20/19   0229  03/20/19   0856  03/21/19   0207   WBC  22.2*  18.7*  10.2   RBC  5.62*  5.13  4.24   HEMOGLOBIN  16.0  14.4  11.9*   HEMATOCRIT  48.4*  44.3  34.9*   MCV  86.1  86.4  82.3   MCH  28.5  28.1  28.1   MCHC  33.1*  32.5*  34.1   RDW  43.6  44.3  41.8   PLATELETCT  312  251  203   MPV  10.8  10.2  10.3     Recent Labs      03/21/19   1430  03/21/19   1730  03/21/19   2342  03/22/19   0550   SODIUM  150*   --   147*  146*   POTASSIUM  3.2*  3.3*  3.1*  3.1*   CHLORIDE  122*   --   119*  119*   CO2  19*   --   19*  21   GLUCOSE  186*   --   198*  206*   BUN  20   --   16  13   CREATININE  1.25   --   1.06  0.99   CALCIUM  8.1*   --   8.0*  8.2*                   Imaging  CT-CHEST (THORAX) WITH   Final Result         1. Small, predominantly anterior pneumomediastinum, etiology uncertain. No air adjacent to trachea or esophagus to suggest tracheal/esophageal injury.   2. Ill-defined opacities in the dependent left lower lobe, atelectasis versus early evolving consolidations. Early pneumonia may have this appearance.   3. Trace perisplenic ascites.               DX-CHEST-PORTABLE (1 VIEW)   Final Result         1.  Hazy pulmonary opacities suggests subtle infiltrate.      CT-RENAL COLIC EVALUATION(A/P W/O)   Final Result     "     1.  Mediastinal air anteriorly, of uncertain etiology. Correlate with history, additional workup as clinically appropriate.   2.  Markedly distended bladder, consider bladder atony or bladder outlet obstruction as clinically appropriate. Incidentally catheter placement.      DX-CHEST-PORTABLE (1 VIEW)   Final Result         1.  No acute cardiopulmonary disease.           Assessment/Plan  * DKA (diabetic ketoacidoses) (HCC)- (present on admission)   Assessment & Plan    Insulin drip  Replace electrolytes     DM (diabetes mellitus) type 1, uncontrolled, with ketoacidosis (HCC)- (present on admission)   Assessment & Plan    Frequently admitted for DKA, presents with the same  Likely due to non-compliance, complicated by gastroparesis  Severe nausea continues, concern about starting long acting subcutaneous insulin with no calorie intake  Continue insulin drip     Abnormal chest x-ray- (present on admission)   Assessment & Plan    Pneumonia has been ruled out  Antibiotics stopped     Gastroparesis due to DM (HCC)- (present on admission)   Assessment & Plan    ?cause of vomiting     Gastroparesis- (present on admission)   Assessment & Plan    Severe nausea and vomiting uncontrolled  Schedule reglan, IV for now  Consider zyprexa or haldol if not effective     CHERELLE (acute kidney injury) (HCC)- (present on admission)   Assessment & Plan    Prerenal  This morning's labs reviewed, creatinine improved to normal range     Pneumomediastinum (HCC)- (present on admission)   Assessment & Plan    Likely due to wretching    CT results from 3/21/19 reviewed:  \"1. Small, predominantly anterior pneumomediastinum, etiology uncertain. No air adjacent to trachea or esophagus to suggest tracheal/esophageal injury.  2. Ill-defined opacities in the dependent left lower lobe, atelectasis versus early evolving consolidations. Early pneumonia may have this appearance.  3. Trace perisplenic ascites.\"     Dyslipidemia- (present on admission) "   Assessment & Plan    Continue statin therapy           VTE prophylaxis: lovenox

## 2019-03-22 NOTE — PROGRESS NOTES
2 RN skin assessment completed. Pt with the following noted:    - redness/flushing to face  - generalized redness to torso and extremities  - redness/rash to bilateral elbows  - small abrasions to bilateral knees

## 2019-03-23 LAB
ANION GAP SERPL CALC-SCNC: 5 MMOL/L (ref 0–11.9)
ANION GAP SERPL CALC-SCNC: 7 MMOL/L (ref 0–11.9)
ANION GAP SERPL CALC-SCNC: 7 MMOL/L (ref 0–11.9)
ANION GAP SERPL CALC-SCNC: 9 MMOL/L (ref 0–11.9)
BASOPHILS # BLD AUTO: 0.4 % (ref 0–1.8)
BASOPHILS # BLD: 0.03 K/UL (ref 0–0.12)
BUN SERPL-MCNC: 8 MG/DL (ref 8–22)
BUN SERPL-MCNC: 8 MG/DL (ref 8–22)
BUN SERPL-MCNC: 9 MG/DL (ref 8–22)
BUN SERPL-MCNC: 9 MG/DL (ref 8–22)
CALCIUM SERPL-MCNC: 7.9 MG/DL (ref 8.5–10.5)
CALCIUM SERPL-MCNC: 7.9 MG/DL (ref 8.5–10.5)
CALCIUM SERPL-MCNC: 8 MG/DL (ref 8.5–10.5)
CALCIUM SERPL-MCNC: 8.1 MG/DL (ref 8.5–10.5)
CHLORIDE SERPL-SCNC: 110 MMOL/L (ref 96–112)
CHLORIDE SERPL-SCNC: 111 MMOL/L (ref 96–112)
CHLORIDE SERPL-SCNC: 112 MMOL/L (ref 96–112)
CHLORIDE SERPL-SCNC: 113 MMOL/L (ref 96–112)
CO2 SERPL-SCNC: 27 MMOL/L (ref 20–33)
CO2 SERPL-SCNC: 27 MMOL/L (ref 20–33)
CO2 SERPL-SCNC: 28 MMOL/L (ref 20–33)
CO2 SERPL-SCNC: 29 MMOL/L (ref 20–33)
CREAT SERPL-MCNC: 0.65 MG/DL (ref 0.5–1.4)
CREAT SERPL-MCNC: 0.66 MG/DL (ref 0.5–1.4)
CREAT SERPL-MCNC: 0.7 MG/DL (ref 0.5–1.4)
CREAT SERPL-MCNC: 0.74 MG/DL (ref 0.5–1.4)
EOSINOPHIL # BLD AUTO: 0.07 K/UL (ref 0–0.51)
EOSINOPHIL NFR BLD: 0.9 % (ref 0–6.9)
ERYTHROCYTE [DISTWIDTH] IN BLOOD BY AUTOMATED COUNT: 43.9 FL (ref 35.9–50)
GLUCOSE BLD-MCNC: 126 MG/DL (ref 65–99)
GLUCOSE BLD-MCNC: 131 MG/DL (ref 65–99)
GLUCOSE BLD-MCNC: 135 MG/DL (ref 65–99)
GLUCOSE BLD-MCNC: 136 MG/DL (ref 65–99)
GLUCOSE BLD-MCNC: 142 MG/DL (ref 65–99)
GLUCOSE BLD-MCNC: 149 MG/DL (ref 65–99)
GLUCOSE BLD-MCNC: 149 MG/DL (ref 65–99)
GLUCOSE BLD-MCNC: 151 MG/DL (ref 65–99)
GLUCOSE BLD-MCNC: 153 MG/DL (ref 65–99)
GLUCOSE BLD-MCNC: 154 MG/DL (ref 65–99)
GLUCOSE BLD-MCNC: 157 MG/DL (ref 65–99)
GLUCOSE BLD-MCNC: 160 MG/DL (ref 65–99)
GLUCOSE BLD-MCNC: 162 MG/DL (ref 65–99)
GLUCOSE BLD-MCNC: 167 MG/DL (ref 65–99)
GLUCOSE BLD-MCNC: 172 MG/DL (ref 65–99)
GLUCOSE BLD-MCNC: 176 MG/DL (ref 65–99)
GLUCOSE BLD-MCNC: 180 MG/DL (ref 65–99)
GLUCOSE BLD-MCNC: 191 MG/DL (ref 65–99)
GLUCOSE BLD-MCNC: 199 MG/DL (ref 65–99)
GLUCOSE BLD-MCNC: 255 MG/DL (ref 65–99)
GLUCOSE SERPL-MCNC: 160 MG/DL (ref 65–99)
GLUCOSE SERPL-MCNC: 171 MG/DL (ref 65–99)
GLUCOSE SERPL-MCNC: 173 MG/DL (ref 65–99)
GLUCOSE SERPL-MCNC: 205 MG/DL (ref 65–99)
HCT VFR BLD AUTO: 37.1 % (ref 37–47)
HGB BLD-MCNC: 12.8 G/DL (ref 12–16)
IMM GRANULOCYTES # BLD AUTO: 0.03 K/UL (ref 0–0.11)
IMM GRANULOCYTES NFR BLD AUTO: 0.4 % (ref 0–0.9)
LYMPHOCYTES # BLD AUTO: 1.5 K/UL (ref 1–4.8)
LYMPHOCYTES NFR BLD: 19.8 % (ref 22–41)
MAGNESIUM SERPL-MCNC: 1.6 MG/DL (ref 1.5–2.5)
MCH RBC QN AUTO: 28.6 PG (ref 27–33)
MCHC RBC AUTO-ENTMCNC: 34.5 G/DL (ref 33.6–35)
MCV RBC AUTO: 83 FL (ref 81.4–97.8)
MONOCYTES # BLD AUTO: 0.8 K/UL (ref 0–0.85)
MONOCYTES NFR BLD AUTO: 10.6 % (ref 0–13.4)
NEUTROPHILS # BLD AUTO: 5.13 K/UL (ref 2–7.15)
NEUTROPHILS NFR BLD: 67.9 % (ref 44–72)
NRBC # BLD AUTO: 0 K/UL
NRBC BLD-RTO: 0 /100 WBC
PHOSPHATE SERPL-MCNC: 2.1 MG/DL (ref 2.5–4.5)
PLATELET # BLD AUTO: 158 K/UL (ref 164–446)
PMV BLD AUTO: 10.3 FL (ref 9–12.9)
POTASSIUM SERPL-SCNC: 3.2 MMOL/L (ref 3.6–5.5)
POTASSIUM SERPL-SCNC: 3.2 MMOL/L (ref 3.6–5.5)
POTASSIUM SERPL-SCNC: 3.4 MMOL/L (ref 3.6–5.5)
POTASSIUM SERPL-SCNC: 3.4 MMOL/L (ref 3.6–5.5)
RBC # BLD AUTO: 4.47 M/UL (ref 4.2–5.4)
SODIUM SERPL-SCNC: 144 MMOL/L (ref 135–145)
SODIUM SERPL-SCNC: 145 MMOL/L (ref 135–145)
SODIUM SERPL-SCNC: 147 MMOL/L (ref 135–145)
SODIUM SERPL-SCNC: 149 MMOL/L (ref 135–145)
WBC # BLD AUTO: 7.6 K/UL (ref 4.8–10.8)

## 2019-03-23 PROCEDURE — 99233 SBSQ HOSP IP/OBS HIGH 50: CPT | Performed by: HOSPITALIST

## 2019-03-23 PROCEDURE — 83735 ASSAY OF MAGNESIUM: CPT

## 2019-03-23 PROCEDURE — 770022 HCHG ROOM/CARE - ICU (200)

## 2019-03-23 PROCEDURE — 700102 HCHG RX REV CODE 250 W/ 637 OVERRIDE(OP): Performed by: INTERNAL MEDICINE

## 2019-03-23 PROCEDURE — 700111 HCHG RX REV CODE 636 W/ 250 OVERRIDE (IP): Performed by: HOSPITALIST

## 2019-03-23 PROCEDURE — 99291 CRITICAL CARE FIRST HOUR: CPT | Performed by: INTERNAL MEDICINE

## 2019-03-23 PROCEDURE — 700105 HCHG RX REV CODE 258: Performed by: INTERNAL MEDICINE

## 2019-03-23 PROCEDURE — A9270 NON-COVERED ITEM OR SERVICE: HCPCS | Performed by: INTERNAL MEDICINE

## 2019-03-23 PROCEDURE — 84100 ASSAY OF PHOSPHORUS: CPT

## 2019-03-23 PROCEDURE — 700111 HCHG RX REV CODE 636 W/ 250 OVERRIDE (IP): Performed by: INTERNAL MEDICINE

## 2019-03-23 PROCEDURE — 85025 COMPLETE CBC W/AUTO DIFF WBC: CPT

## 2019-03-23 PROCEDURE — 82962 GLUCOSE BLOOD TEST: CPT | Mod: 91

## 2019-03-23 PROCEDURE — 700101 HCHG RX REV CODE 250: Performed by: INTERNAL MEDICINE

## 2019-03-23 PROCEDURE — 80048 BASIC METABOLIC PNL TOTAL CA: CPT

## 2019-03-23 RX ORDER — POTASSIUM CHLORIDE 14.9 MG/ML
20 INJECTION INTRAVENOUS ONCE
Status: COMPLETED | OUTPATIENT
Start: 2019-03-23 | End: 2019-03-23

## 2019-03-23 RX ORDER — OLANZAPINE 5 MG/1
5 TABLET, ORALLY DISINTEGRATING ORAL ONCE
Status: COMPLETED | OUTPATIENT
Start: 2019-03-23 | End: 2019-03-23

## 2019-03-23 RX ORDER — POTASSIUM CHLORIDE 7.45 MG/ML
10 INJECTION INTRAVENOUS ONCE
Status: COMPLETED | OUTPATIENT
Start: 2019-03-23 | End: 2019-03-23

## 2019-03-23 RX ORDER — HYDRALAZINE HYDROCHLORIDE 20 MG/ML
10 INJECTION INTRAMUSCULAR; INTRAVENOUS EVERY 6 HOURS PRN
Status: DISCONTINUED | OUTPATIENT
Start: 2019-03-23 | End: 2019-03-26 | Stop reason: HOSPADM

## 2019-03-23 RX ORDER — MAGNESIUM SULFATE HEPTAHYDRATE 40 MG/ML
2 INJECTION, SOLUTION INTRAVENOUS ONCE
Status: COMPLETED | OUTPATIENT
Start: 2019-03-23 | End: 2019-03-23

## 2019-03-23 RX ADMIN — POTASSIUM CHLORIDE 10 MEQ: 7.46 INJECTION, SOLUTION INTRAVENOUS at 22:22

## 2019-03-23 RX ADMIN — ENOXAPARIN SODIUM 40 MG: 100 INJECTION SUBCUTANEOUS at 17:36

## 2019-03-23 RX ADMIN — POTASSIUM CHLORIDE 10 MEQ: 7.46 INJECTION, SOLUTION INTRAVENOUS at 03:36

## 2019-03-23 RX ADMIN — MORPHINE SULFATE 2 MG: 4 INJECTION INTRAVENOUS at 23:31

## 2019-03-23 RX ADMIN — OLANZAPINE 5 MG: 5 TABLET, ORALLY DISINTEGRATING ORAL at 10:33

## 2019-03-23 RX ADMIN — METOCLOPRAMIDE 10 MG: 5 INJECTION, SOLUTION INTRAMUSCULAR; INTRAVENOUS at 13:24

## 2019-03-23 RX ADMIN — POTASSIUM PHOSPHATE, MONOBASIC AND POTASSIUM PHOSPHATE, DIBASIC 15 MMOL: 224; 236 INJECTION, SOLUTION INTRAVENOUS at 10:34

## 2019-03-23 RX ADMIN — METOCLOPRAMIDE 10 MG: 5 INJECTION, SOLUTION INTRAMUSCULAR; INTRAVENOUS at 06:27

## 2019-03-23 RX ADMIN — POTASSIUM CHLORIDE 20 MEQ: 14.9 INJECTION, SOLUTION INTRAVENOUS at 14:19

## 2019-03-23 RX ADMIN — POTASSIUM CHLORIDE 20 MEQ: 14.9 INJECTION, SOLUTION INTRAVENOUS at 08:08

## 2019-03-23 RX ADMIN — POTASSIUM CHLORIDE 20 MEQ: 14.9 INJECTION, SOLUTION INTRAVENOUS at 20:14

## 2019-03-23 RX ADMIN — SODIUM CHLORIDE 2 UNITS/HR: 9 INJECTION, SOLUTION INTRAVENOUS at 01:58

## 2019-03-23 RX ADMIN — MORPHINE SULFATE 2 MG: 4 INJECTION INTRAVENOUS at 08:08

## 2019-03-23 RX ADMIN — FAMOTIDINE 20 MG: 10 INJECTION INTRAVENOUS at 17:36

## 2019-03-23 RX ADMIN — MORPHINE SULFATE 2 MG: 4 INJECTION INTRAVENOUS at 00:16

## 2019-03-23 RX ADMIN — SODIUM CHLORIDE, SODIUM LACTATE, POTASSIUM CHLORIDE, CALCIUM CHLORIDE AND DEXTROSE MONOHYDRATE: 5; 600; 310; 30; 20 INJECTION, SOLUTION INTRAVENOUS at 01:56

## 2019-03-23 RX ADMIN — METOCLOPRAMIDE 10 MG: 5 INJECTION, SOLUTION INTRAMUSCULAR; INTRAVENOUS at 22:23

## 2019-03-23 RX ADMIN — PROCHLORPERAZINE EDISYLATE 10 MG: 5 INJECTION INTRAMUSCULAR; INTRAVENOUS at 17:36

## 2019-03-23 RX ADMIN — FAMOTIDINE 20 MG: 10 INJECTION INTRAVENOUS at 06:27

## 2019-03-23 RX ADMIN — POTASSIUM CHLORIDE 20 MEQ: 14.9 INJECTION, SOLUTION INTRAVENOUS at 01:25

## 2019-03-23 RX ADMIN — MORPHINE SULFATE 2 MG: 4 INJECTION INTRAVENOUS at 17:36

## 2019-03-23 RX ADMIN — MAGNESIUM SULFATE IN WATER 2 G: 40 INJECTION, SOLUTION INTRAVENOUS at 10:34

## 2019-03-23 RX ADMIN — PROCHLORPERAZINE EDISYLATE 10 MG: 5 INJECTION INTRAMUSCULAR; INTRAVENOUS at 08:08

## 2019-03-23 RX ADMIN — PROCHLORPERAZINE EDISYLATE 10 MG: 5 INJECTION INTRAMUSCULAR; INTRAVENOUS at 01:29

## 2019-03-23 ASSESSMENT — ENCOUNTER SYMPTOMS
ABDOMINAL PAIN: 1
HEADACHES: 0
BACK PAIN: 0
HEMOPTYSIS: 0
WEAKNESS: 0
DIARRHEA: 0
NAUSEA: 1
SEIZURES: 0
VOMITING: 1
PALPITATIONS: 0
LOSS OF CONSCIOUSNESS: 0
FLANK PAIN: 1
FLANK PAIN: 0
SPUTUM PRODUCTION: 0
CONSTIPATION: 0
BLOOD IN STOOL: 0
SHORTNESS OF BREATH: 0
FEVER: 0
CHILLS: 0
NECK PAIN: 0
FOCAL WEAKNESS: 0
COUGH: 1

## 2019-03-23 NOTE — PROGRESS NOTES
Will sign off at this time if critical care can be of assistance please re-consult.     Hugo Benz MD  Critical Care Medicine

## 2019-03-23 NOTE — PROGRESS NOTES
[facility] Medicine Daily Progress Note    Date of Service  3/23/2019    Chief Complaint  28 y.o. female admitted 3/20/2019 with right flank pain.    Hospital Course    Ms Sparks has a history of insulin dependent diabetes.  She is frequently admitted for DKA.  She presented to the ER on 3/20/19 with right flank pain and nausea/vomiting.  The patient was found to be in DKA and was admitted to the ICU, she was admitted to the ICU      Interval Problem Update  Continued nausea, less emesis  She received scheduled Reglan through the day and night, subjectively she reports slight improvement  Complains of generalized abdominal pain and right flank pain, this is better with morphine  Denies chest pain and denies shortness of breath    Consultants/Specialty  Critical Care, I discussed the patient's condition with Dr. Benz today on ICU Rounds    Code Status  Full Code    Disposition  TBD, ICU for now    Review of Systems  Review of Systems   Constitutional: Negative for chills and fever.   Respiratory: Positive for cough. Negative for hemoptysis, sputum production and shortness of breath.    Cardiovascular: Negative for chest pain and palpitations.   Gastrointestinal: Positive for abdominal pain, nausea and vomiting. Negative for blood in stool and constipation.   Genitourinary: Positive for flank pain. Negative for dysuria and urgency.   Musculoskeletal: Negative for back pain and neck pain.   Skin: Negative for itching and rash.        Physical Exam  Temp:  [35.7 °C (96.2 °F)-37.3 °C (99.2 °F)] 35.9 °C (96.7 °F)  Pulse:  [] 120  Resp:  [12-25] 19  SpO2:  [92 %-99 %] 92 %    Physical Exam   Constitutional: She is oriented to person, place, and time. She appears well-developed and well-nourished.   HENT:   Head: Normocephalic and atraumatic.   Eyes: Conjunctivae and EOM are normal. Right eye exhibits no discharge. Left eye exhibits no discharge.   Cardiovascular: Normal rate, regular rhythm and intact distal  pulses.    No murmur heard.  2+ Radial Pulses  Brisk Capillary Refill   Pulmonary/Chest: Effort normal and breath sounds normal. No respiratory distress. She has no wheezes.   Abdominal: Soft. Bowel sounds are normal. She exhibits no distension. There is tenderness. There is no rebound and no guarding.   Musculoskeletal: Normal range of motion. She exhibits no edema.   Neurological: She is alert and oriented to person, place, and time. No cranial nerve deficit.   Skin: Skin is warm and dry. She is not diaphoretic. No erythema.   Skin is warm and well perfused   Psychiatric: She has a normal mood and affect.       Fluids    Intake/Output Summary (Last 24 hours) at 03/23/19 0948  Last data filed at 03/23/19 0800   Gross per 24 hour   Intake          2128.09 ml   Output             1750 ml   Net           378.09 ml       Laboratory  Recent Labs      03/21/19   0207  03/23/19   0445   WBC  10.2  7.6   RBC  4.24  4.47   HEMOGLOBIN  11.9*  12.8   HEMATOCRIT  34.9*  37.1   MCV  82.3  83.0   MCH  28.1  28.6   MCHC  34.1  34.5   RDW  41.8  43.9   PLATELETCT  203  158*   MPV  10.3  10.3     Recent Labs      03/22/19   1731  03/22/19   2341  03/23/19   0445   SODIUM  148*  149*  145   POTASSIUM  3.1*  3.2*  3.4*   CHLORIDE  116*  113*  111   CO2  25  27  27   GLUCOSE  152*  173*  205*   BUN  9  9  8   CREATININE  0.83  0.74  0.70   CALCIUM  8.2*  8.0*  8.1*                   Imaging  CT-CHEST (THORAX) WITH   Final Result         1. Small, predominantly anterior pneumomediastinum, etiology uncertain. No air adjacent to trachea or esophagus to suggest tracheal/esophageal injury.   2. Ill-defined opacities in the dependent left lower lobe, atelectasis versus early evolving consolidations. Early pneumonia may have this appearance.   3. Trace perisplenic ascites.               DX-CHEST-PORTABLE (1 VIEW)   Final Result         1.  Hazy pulmonary opacities suggests subtle infiltrate.      CT-RENAL COLIC EVALUATION(A/P W/O)   Final  "Result         1.  Mediastinal air anteriorly, of uncertain etiology. Correlate with history, additional workup as clinically appropriate.   2.  Markedly distended bladder, consider bladder atony or bladder outlet obstruction as clinically appropriate. Incidentally catheter placement.      DX-CHEST-PORTABLE (1 VIEW)   Final Result         1.  No acute cardiopulmonary disease.           Assessment/Plan  * DKA (diabetic ketoacidoses) ([facility])- (present on admission)   Assessment & Plan    Insulin drip  Replace electrolytes     DM (diabetes mellitus) type 1, uncontrolled, with ketoacidosis ([facility])- (present on admission)   Assessment & Plan    Frequently admitted for DKA, presents with the same  Likely due to non-compliance, complicated by gastroparesis  Continue insulin drip as PO intake not reliable     Abnormal chest x-ray- (present on admission)   Assessment & Plan    Pneumonia has been ruled out  Antibiotics stopped     Gastroparesis due to DM ([facility])- (present on admission)   Assessment & Plan    Gastroparesis is likely the cause of her nausea and vommiting     Gastroparesis- (present on admission)   Assessment & Plan    Severe nausea and vomiting uncontrolled  Slow to respond, scheduled Reglan has not seem to improve things  Add Zyprexa, check EKG to confirm no QT segment prolongation     CHERELLE (acute kidney injury) ([facility])- (present on admission)   Assessment & Plan    Prerenal, this is resolved     Pneumomediastinum ([facility])- (present on admission)   Assessment & Plan    Likely due to wretching  She has no symptoms of chest pain  Conservative managment    CT results from 3/21/19:  \"1. Small, predominantly anterior pneumomediastinum, etiology uncertain. No air adjacent to trachea or esophagus to suggest tracheal/esophageal injury.  2. Ill-defined opacities in the dependent left lower lobe, atelectasis versus early evolving consolidations. Early pneumonia may have this appearance.  3. Trace perisplenic ascites.\"   "   Dyslipidemia- (present on admission)   Assessment & Plan    Statin          VTE prophylaxis: lovenox

## 2019-03-23 NOTE — CARE PLAN
Problem: Safety  Goal: Will remain free from falls  Outcome: PROGRESSING AS EXPECTED  Hourly rounds performed, patient's call light and belongings within reach. Bed alarm turned on.     Problem: Pain Management  Goal: Pain level will decrease to patient's comfort goal  Outcome: PROGRESSING AS EXPECTED  Patient's pain evaluated every 2 hours and PRN. Patient is aware of available PRN medication and given as needed.

## 2019-03-24 LAB
ANION GAP SERPL CALC-SCNC: 10 MMOL/L (ref 0–11.9)
ANION GAP SERPL CALC-SCNC: 7 MMOL/L (ref 0–11.9)
ANION GAP SERPL CALC-SCNC: 7 MMOL/L (ref 0–11.9)
ANION GAP SERPL CALC-SCNC: 8 MMOL/L (ref 0–11.9)
BASOPHILS # BLD AUTO: 0.1 % (ref 0–1.8)
BASOPHILS # BLD: 0.01 K/UL (ref 0–0.12)
BUN SERPL-MCNC: 10 MG/DL (ref 8–22)
BUN SERPL-MCNC: 9 MG/DL (ref 8–22)
CALCIUM SERPL-MCNC: 7.8 MG/DL (ref 8.5–10.5)
CALCIUM SERPL-MCNC: 7.8 MG/DL (ref 8.5–10.5)
CALCIUM SERPL-MCNC: 8 MG/DL (ref 8.5–10.5)
CALCIUM SERPL-MCNC: 8 MG/DL (ref 8.5–10.5)
CHLORIDE SERPL-SCNC: 107 MMOL/L (ref 96–112)
CHLORIDE SERPL-SCNC: 108 MMOL/L (ref 96–112)
CHLORIDE SERPL-SCNC: 108 MMOL/L (ref 96–112)
CHLORIDE SERPL-SCNC: 110 MMOL/L (ref 96–112)
CO2 SERPL-SCNC: 25 MMOL/L (ref 20–33)
CO2 SERPL-SCNC: 28 MMOL/L (ref 20–33)
CREAT SERPL-MCNC: 0.63 MG/DL (ref 0.5–1.4)
CREAT SERPL-MCNC: 0.65 MG/DL (ref 0.5–1.4)
EKG IMPRESSION: NORMAL
EKG IMPRESSION: NORMAL
EOSINOPHIL # BLD AUTO: 0.07 K/UL (ref 0–0.51)
EOSINOPHIL NFR BLD: 0.9 % (ref 0–6.9)
ERYTHROCYTE [DISTWIDTH] IN BLOOD BY AUTOMATED COUNT: 43.3 FL (ref 35.9–50)
GLUCOSE BLD-MCNC: 141 MG/DL (ref 65–99)
GLUCOSE BLD-MCNC: 153 MG/DL (ref 65–99)
GLUCOSE BLD-MCNC: 160 MG/DL (ref 65–99)
GLUCOSE BLD-MCNC: 166 MG/DL (ref 65–99)
GLUCOSE BLD-MCNC: 167 MG/DL (ref 65–99)
GLUCOSE BLD-MCNC: 182 MG/DL (ref 65–99)
GLUCOSE BLD-MCNC: 182 MG/DL (ref 65–99)
GLUCOSE BLD-MCNC: 183 MG/DL (ref 65–99)
GLUCOSE BLD-MCNC: 186 MG/DL (ref 65–99)
GLUCOSE BLD-MCNC: 189 MG/DL (ref 65–99)
GLUCOSE BLD-MCNC: 199 MG/DL (ref 65–99)
GLUCOSE BLD-MCNC: 200 MG/DL (ref 65–99)
GLUCOSE BLD-MCNC: 208 MG/DL (ref 65–99)
GLUCOSE BLD-MCNC: 217 MG/DL (ref 65–99)
GLUCOSE BLD-MCNC: 232 MG/DL (ref 65–99)
GLUCOSE BLD-MCNC: 246 MG/DL (ref 65–99)
GLUCOSE BLD-MCNC: 266 MG/DL (ref 65–99)
GLUCOSE BLD-MCNC: 278 MG/DL (ref 65–99)
GLUCOSE BLD-MCNC: 321 MG/DL (ref 65–99)
GLUCOSE SERPL-MCNC: 201 MG/DL (ref 65–99)
GLUCOSE SERPL-MCNC: 212 MG/DL (ref 65–99)
GLUCOSE SERPL-MCNC: 262 MG/DL (ref 65–99)
GLUCOSE SERPL-MCNC: 268 MG/DL (ref 65–99)
HCT VFR BLD AUTO: 41.6 % (ref 37–47)
HGB BLD-MCNC: 14.3 G/DL (ref 12–16)
IMM GRANULOCYTES # BLD AUTO: 0.02 K/UL (ref 0–0.11)
IMM GRANULOCYTES NFR BLD AUTO: 0.3 % (ref 0–0.9)
LACTATE BLD-SCNC: 1.4 MMOL/L (ref 0.5–2)
LYMPHOCYTES # BLD AUTO: 2.15 K/UL (ref 1–4.8)
LYMPHOCYTES NFR BLD: 28 % (ref 22–41)
MAGNESIUM SERPL-MCNC: 1.9 MG/DL (ref 1.5–2.5)
MCH RBC QN AUTO: 28.7 PG (ref 27–33)
MCHC RBC AUTO-ENTMCNC: 34.4 G/DL (ref 33.6–35)
MCV RBC AUTO: 83.4 FL (ref 81.4–97.8)
MONOCYTES # BLD AUTO: 0.83 K/UL (ref 0–0.85)
MONOCYTES NFR BLD AUTO: 10.8 % (ref 0–13.4)
NEUTROPHILS # BLD AUTO: 4.59 K/UL (ref 2–7.15)
NEUTROPHILS NFR BLD: 59.9 % (ref 44–72)
NRBC # BLD AUTO: 0 K/UL
NRBC BLD-RTO: 0 /100 WBC
PHOSPHATE SERPL-MCNC: 2.2 MG/DL (ref 2.5–4.5)
PLATELET # BLD AUTO: 175 K/UL (ref 164–446)
PMV BLD AUTO: 10.7 FL (ref 9–12.9)
POTASSIUM SERPL-SCNC: 3 MMOL/L (ref 3.6–5.5)
POTASSIUM SERPL-SCNC: 3.4 MMOL/L (ref 3.6–5.5)
POTASSIUM SERPL-SCNC: 3.8 MMOL/L (ref 3.6–5.5)
POTASSIUM SERPL-SCNC: 3.8 MMOL/L (ref 3.6–5.5)
RBC # BLD AUTO: 4.99 M/UL (ref 4.2–5.4)
SODIUM SERPL-SCNC: 142 MMOL/L (ref 135–145)
SODIUM SERPL-SCNC: 143 MMOL/L (ref 135–145)
SODIUM SERPL-SCNC: 144 MMOL/L (ref 135–145)
SODIUM SERPL-SCNC: 145 MMOL/L (ref 135–145)
WBC # BLD AUTO: 7.7 K/UL (ref 4.8–10.8)

## 2019-03-24 PROCEDURE — 84100 ASSAY OF PHOSPHORUS: CPT

## 2019-03-24 PROCEDURE — 700102 HCHG RX REV CODE 250 W/ 637 OVERRIDE(OP): Performed by: HOSPITALIST

## 2019-03-24 PROCEDURE — 700101 HCHG RX REV CODE 250: Performed by: HOSPITALIST

## 2019-03-24 PROCEDURE — 700111 HCHG RX REV CODE 636 W/ 250 OVERRIDE (IP): Performed by: INTERNAL MEDICINE

## 2019-03-24 PROCEDURE — A9270 NON-COVERED ITEM OR SERVICE: HCPCS | Performed by: HOSPITALIST

## 2019-03-24 PROCEDURE — 700105 HCHG RX REV CODE 258: Performed by: INTERNAL MEDICINE

## 2019-03-24 PROCEDURE — 700102 HCHG RX REV CODE 250 W/ 637 OVERRIDE(OP): Performed by: INTERNAL MEDICINE

## 2019-03-24 PROCEDURE — 83605 ASSAY OF LACTIC ACID: CPT

## 2019-03-24 PROCEDURE — 93005 ELECTROCARDIOGRAM TRACING: CPT | Performed by: HOSPITALIST

## 2019-03-24 PROCEDURE — 80048 BASIC METABOLIC PNL TOTAL CA: CPT

## 2019-03-24 PROCEDURE — 770022 HCHG ROOM/CARE - ICU (200)

## 2019-03-24 PROCEDURE — 700101 HCHG RX REV CODE 250: Performed by: INTERNAL MEDICINE

## 2019-03-24 PROCEDURE — 93010 ELECTROCARDIOGRAM REPORT: CPT | Mod: 76 | Performed by: INTERNAL MEDICINE

## 2019-03-24 PROCEDURE — 85025 COMPLETE CBC W/AUTO DIFF WBC: CPT

## 2019-03-24 PROCEDURE — 700105 HCHG RX REV CODE 258

## 2019-03-24 PROCEDURE — 83735 ASSAY OF MAGNESIUM: CPT

## 2019-03-24 PROCEDURE — 700111 HCHG RX REV CODE 636 W/ 250 OVERRIDE (IP): Performed by: HOSPITALIST

## 2019-03-24 PROCEDURE — 99233 SBSQ HOSP IP/OBS HIGH 50: CPT | Performed by: HOSPITALIST

## 2019-03-24 PROCEDURE — 700105 HCHG RX REV CODE 258: Performed by: HOSPITALIST

## 2019-03-24 PROCEDURE — 82962 GLUCOSE BLOOD TEST: CPT | Mod: 91

## 2019-03-24 RX ORDER — METOPROLOL TARTRATE 1 MG/ML
5 INJECTION, SOLUTION INTRAVENOUS
Status: COMPLETED | OUTPATIENT
Start: 2019-03-24 | End: 2019-03-24

## 2019-03-24 RX ORDER — SODIUM CHLORIDE 9 MG/ML
INJECTION, SOLUTION INTRAVENOUS
Status: COMPLETED
Start: 2019-03-24 | End: 2019-03-24

## 2019-03-24 RX ORDER — OLANZAPINE 5 MG/1
5 TABLET, ORALLY DISINTEGRATING ORAL EVERY EVENING
Status: DISCONTINUED | OUTPATIENT
Start: 2019-03-24 | End: 2019-03-26 | Stop reason: HOSPADM

## 2019-03-24 RX ORDER — MAGNESIUM SULFATE HEPTAHYDRATE 40 MG/ML
2 INJECTION, SOLUTION INTRAVENOUS ONCE
Status: COMPLETED | OUTPATIENT
Start: 2019-03-24 | End: 2019-03-24

## 2019-03-24 RX ORDER — POTASSIUM CHLORIDE 7.45 MG/ML
10 INJECTION INTRAVENOUS ONCE
Status: COMPLETED | OUTPATIENT
Start: 2019-03-24 | End: 2019-03-24

## 2019-03-24 RX ORDER — POTASSIUM CHLORIDE 7.45 MG/ML
10 INJECTION INTRAVENOUS
Status: DISCONTINUED | OUTPATIENT
Start: 2019-03-24 | End: 2019-03-24

## 2019-03-24 RX ORDER — INSULIN GLARGINE 100 [IU]/ML
20 INJECTION, SOLUTION SUBCUTANEOUS 2 TIMES DAILY
Status: DISCONTINUED | OUTPATIENT
Start: 2019-03-24 | End: 2019-03-26 | Stop reason: HOSPADM

## 2019-03-24 RX ORDER — LORAZEPAM 2 MG/ML
.5-1 INJECTION INTRAMUSCULAR EVERY 4 HOURS PRN
Status: DISCONTINUED | OUTPATIENT
Start: 2019-03-24 | End: 2019-03-26 | Stop reason: HOSPADM

## 2019-03-24 RX ORDER — DEXTROSE MONOHYDRATE 25 G/50ML
25 INJECTION, SOLUTION INTRAVENOUS
Status: DISCONTINUED | OUTPATIENT
Start: 2019-03-24 | End: 2019-03-25

## 2019-03-24 RX ORDER — SODIUM CHLORIDE 9 MG/ML
500 INJECTION, SOLUTION INTRAVENOUS ONCE
Status: COMPLETED | OUTPATIENT
Start: 2019-03-24 | End: 2019-03-24

## 2019-03-24 RX ADMIN — MORPHINE SULFATE 2 MG: 4 INJECTION INTRAVENOUS at 18:04

## 2019-03-24 RX ADMIN — OLANZAPINE 5 MG: 5 TABLET, ORALLY DISINTEGRATING ORAL at 17:42

## 2019-03-24 RX ADMIN — POTASSIUM CHLORIDE 10 MEQ: 7.46 INJECTION, SOLUTION INTRAVENOUS at 15:00

## 2019-03-24 RX ADMIN — POTASSIUM CHLORIDE 10 MEQ: 7.46 INJECTION, SOLUTION INTRAVENOUS at 14:01

## 2019-03-24 RX ADMIN — SODIUM CHLORIDE 2 UNITS/HR: 9 INJECTION, SOLUTION INTRAVENOUS at 00:11

## 2019-03-24 RX ADMIN — FAMOTIDINE 20 MG: 10 INJECTION INTRAVENOUS at 17:41

## 2019-03-24 RX ADMIN — METOCLOPRAMIDE 10 MG: 5 INJECTION, SOLUTION INTRAMUSCULAR; INTRAVENOUS at 07:30

## 2019-03-24 RX ADMIN — POTASSIUM CHLORIDE 10 MEQ: 7.46 INJECTION, SOLUTION INTRAVENOUS at 03:41

## 2019-03-24 RX ADMIN — HYDRALAZINE HYDROCHLORIDE 10 MG: 20 INJECTION INTRAMUSCULAR; INTRAVENOUS at 00:55

## 2019-03-24 RX ADMIN — METOCLOPRAMIDE 10 MG: 5 INJECTION, SOLUTION INTRAMUSCULAR; INTRAVENOUS at 21:18

## 2019-03-24 RX ADMIN — ALTEPLASE 2 MG: 2.2 INJECTION, POWDER, LYOPHILIZED, FOR SOLUTION INTRAVENOUS at 00:50

## 2019-03-24 RX ADMIN — MAGNESIUM SULFATE IN WATER 2 G: 40 INJECTION, SOLUTION INTRAVENOUS at 10:50

## 2019-03-24 RX ADMIN — ALTEPLASE 2 MG: 2.2 INJECTION, POWDER, LYOPHILIZED, FOR SOLUTION INTRAVENOUS at 03:51

## 2019-03-24 RX ADMIN — POTASSIUM PHOSPHATE, MONOBASIC AND POTASSIUM PHOSPHATE, DIBASIC 15 MMOL: 224; 236 INJECTION, SOLUTION INTRAVENOUS at 12:35

## 2019-03-24 RX ADMIN — INSULIN GLARGINE 20 UNITS: 100 INJECTION, SOLUTION SUBCUTANEOUS at 17:39

## 2019-03-24 RX ADMIN — ENOXAPARIN SODIUM 40 MG: 100 INJECTION SUBCUTANEOUS at 17:41

## 2019-03-24 RX ADMIN — METOPROLOL TARTRATE 5 MG: 1 INJECTION, SOLUTION INTRAVENOUS at 04:47

## 2019-03-24 RX ADMIN — SODIUM CHLORIDE, SODIUM LACTATE, POTASSIUM CHLORIDE, CALCIUM CHLORIDE AND DEXTROSE MONOHYDRATE: 5; 600; 310; 30; 20 INJECTION, SOLUTION INTRAVENOUS at 00:09

## 2019-03-24 RX ADMIN — METOCLOPRAMIDE 10 MG: 5 INJECTION, SOLUTION INTRAMUSCULAR; INTRAVENOUS at 14:01

## 2019-03-24 RX ADMIN — MORPHINE SULFATE 2 MG: 4 INJECTION INTRAVENOUS at 07:39

## 2019-03-24 RX ADMIN — LORAZEPAM 0.5 MG: 2 INJECTION INTRAMUSCULAR; INTRAVENOUS at 10:50

## 2019-03-24 RX ADMIN — FAMOTIDINE 20 MG: 10 INJECTION INTRAVENOUS at 07:31

## 2019-03-24 RX ADMIN — SODIUM CHLORIDE 500 ML: 9 INJECTION, SOLUTION INTRAVENOUS at 02:05

## 2019-03-24 RX ADMIN — POTASSIUM CHLORIDE 10 MEQ: 7.46 INJECTION, SOLUTION INTRAVENOUS at 16:00

## 2019-03-24 RX ADMIN — INSULIN HUMAN 6 UNITS: 100 INJECTION, SOLUTION PARENTERAL at 21:14

## 2019-03-24 RX ADMIN — SODIUM CHLORIDE, SODIUM LACTATE, POTASSIUM CHLORIDE, CALCIUM CHLORIDE AND DEXTROSE MONOHYDRATE: 5; 600; 310; 30; 20 INJECTION, SOLUTION INTRAVENOUS at 20:24

## 2019-03-24 ASSESSMENT — ENCOUNTER SYMPTOMS
NECK PAIN: 0
FLANK PAIN: 1
ABDOMINAL PAIN: 1
CONSTIPATION: 0
FEVER: 0
HEMOPTYSIS: 0
COUGH: 1
BACK PAIN: 0
SHORTNESS OF BREATH: 0
VOMITING: 1
NAUSEA: 1
SPUTUM PRODUCTION: 0
BLOOD IN STOOL: 0
PALPITATIONS: 0
CHILLS: 0

## 2019-03-24 NOTE — PROGRESS NOTES
[facility] Medicine Daily Progress Note    Date of Service  3/24/2019    Chief Complaint  28 y.o. female admitted 3/20/2019 with right flank pain.    Hospital Course    Ms Sparks has a history of insulin dependent diabetes.  She is frequently admitted for DKA.  She presented to the ER on 3/20/19 with right flank pain and nausea/vomiting.  The patient was found to be in DKA and was admitted to the ICU.      Interval Problem Update  She is a little better today, nauseated but no emesis this afternoon, she wants to try eating  Abdominal pain/flank pain resolved  Remains on insulin drip, blood sugars 217-266    Consultants/Specialty  Critical Care, I discussed the patient's condition with Dr. Benz today on ICU Rounds    Code Status  Full Code    Disposition  TBD, ICU for now    Review of Systems  Review of Systems   Constitutional: Negative for chills and fever.   Respiratory: Positive for cough. Negative for hemoptysis, sputum production and shortness of breath.    Cardiovascular: Negative for chest pain and palpitations.   Gastrointestinal: Positive for abdominal pain, nausea and vomiting. Negative for blood in stool and constipation.   Genitourinary: Positive for flank pain. Negative for dysuria and urgency.   Musculoskeletal: Negative for back pain and neck pain.   Skin: Negative for itching and rash.        Physical Exam  Temp:  [35.8 °C (96.4 °F)-37.2 °C (99 °F)] 35.8 °C (96.4 °F)  Pulse:  [100-138] 113  Resp:  [3-24] 17  BP: (146)/(98) 146/98  SpO2:  [90 %-97 %] 95 %    Physical Exam   Constitutional: She is oriented to person, place, and time. She appears well-developed and well-nourished.   HENT:   Head: Normocephalic and atraumatic.   Eyes: Conjunctivae and EOM are normal. Right eye exhibits no discharge. Left eye exhibits no discharge.   Cardiovascular: Normal rate, regular rhythm and intact distal pulses.    No murmur heard.  2+ Radial Pulses  Brisk Capillary Refill   Pulmonary/Chest: Effort normal and  breath sounds normal. No respiratory distress. She has no wheezes.   Abdominal: Soft. Bowel sounds are normal. She exhibits no distension. There is tenderness. There is no rebound and no guarding.   Musculoskeletal: Normal range of motion. She exhibits no edema.   Neurological: She is alert and oriented to person, place, and time. No cranial nerve deficit.   Skin: Skin is warm and dry. She is not diaphoretic. No erythema.   Skin is warm and well perfused   Psychiatric: She has a normal mood and affect.       Fluids    Intake/Output Summary (Last 24 hours) at 03/24/19 1639  Last data filed at 03/24/19 0800   Gross per 24 hour   Intake           1668.8 ml   Output              425 ml   Net           1243.8 ml       Laboratory  Recent Labs      03/23/19   0445  03/24/19   0140   WBC  7.6  7.7   RBC  4.47  4.99   HEMOGLOBIN  12.8  14.3   HEMATOCRIT  37.1  41.6   MCV  83.0  83.4   MCH  28.6  28.7   MCHC  34.5  34.4   RDW  43.9  43.3   PLATELETCT  158*  175   MPV  10.3  10.7     Recent Labs      03/24/19   0140  03/24/19   0605  03/24/19   1241   SODIUM  145  143  144   POTASSIUM  3.8  3.4*  3.0*   CHLORIDE  110  108  108   CO2  25  28  28   GLUCOSE  262*  268*  201*   BUN  10  9  10   CREATININE  0.63  0.63  0.63   CALCIUM  8.0*  7.8*  7.8*                   Imaging  CT-CHEST (THORAX) WITH   Final Result         1. Small, predominantly anterior pneumomediastinum, etiology uncertain. No air adjacent to trachea or esophagus to suggest tracheal/esophageal injury.   2. Ill-defined opacities in the dependent left lower lobe, atelectasis versus early evolving consolidations. Early pneumonia may have this appearance.   3. Trace perisplenic ascites.               DX-CHEST-PORTABLE (1 VIEW)   Final Result         1.  Hazy pulmonary opacities suggests subtle infiltrate.      CT-RENAL COLIC EVALUATION(A/P W/O)   Final Result         1.  Mediastinal air anteriorly, of uncertain etiology. Correlate with history, additional workup as  clinically appropriate.   2.  Markedly distended bladder, consider bladder atony or bladder outlet obstruction as clinically appropriate. Incidentally catheter placement.      DX-CHEST-PORTABLE (1 VIEW)   Final Result         1.  No acute cardiopulmonary disease.           Assessment/Plan  * DKA (diabetic ketoacidoses) ([facility])- (present on admission)   Assessment & Plan    Acidosis is resolved     DM (diabetes mellitus) type 1, uncontrolled, with ketoacidosis (HCC)- (present on admission)   Assessment & Plan    Uncontrolled diabetes, she is frequently admitted for DKA, presents with the same  Likely due to non-compliance, complicated by gastroparesis  Discussed compliance with the patient and her mother, patient apathetic during the discussion  Her nausea vomiting is better today, and will transition off insulin drip to subcutaneous insulin     Abnormal chest x-ray- (present on admission)   Assessment & Plan    Pneumonia has been ruled out  Antibiotics stopped     Gastroparesis- (present on admission)   Assessment & Plan    Severe nausea and vomiting uncontrolled  Slow to respond, a little better today, she wants to try eating  Nightly zyprexa     CHERELLE (acute kidney injury) ([facility])- (present on admission)   Assessment & Plan    Prerenal, this is resolved     Pneumomediastinum ([facility])- (present on admission)   Assessment & Plan    Likely due to wretching  She has no symptoms of chest pain  Conservative managment     Dyslipidemia- (present on admission)   Assessment & Plan    Statin          VTE prophylaxis: lovenox

## 2019-03-24 NOTE — CARE PLAN
Problem: Pain Management  Goal: Pain level will decrease to patient's comfort goal    Intervention: Educate and implement non-pharmacologic comfort measures. Examples: relaxation, distration, play therapy, activity therapy, massage, etc.   03/24/19 0450   OTHER   Intervention Medication (see MAR)

## 2019-03-24 NOTE — PROGRESS NOTES
Paged for HTN and sustained elevated HR. Spoke to Charlie, received orders. EKG performed showed ST 140s, LA sent with morning labs resulted 1.4. Continue to monitor

## 2019-03-24 NOTE — PROGRESS NOTES
Monitor Summary: .14/.08/.36. Rhythm: ST. Rate: 100's-120's.     12 hour chart check.     2 RN skin check.

## 2019-03-24 NOTE — CARE PLAN
Problem: Venous Thromboembolism (VTW)/Deep Vein Thrombosis (DVT) Prevention:  Goal: Patient will participate in Venous Thrombosis (VTE)/Deep Vein Thrombosis (DVT)Prevention Measures   03/23/19 0800 03/23/19 1600   Mechanical/VTE Prophylaxis   Mechanical Prophylaxis  --  SCDs, Sequential Compression Device   SCDs, Sequential Compression Device --  Off   OTHER   Risk Assessment Score 1 --    VTE RISK Moderate --    Pharmacologic Prophylaxis Used LMWH: Enoxaparin(Lovenox) --        Problem: Bowel/Gastric:  Goal: Normal bowel function is maintained or improved   03/23/19 1600 03/23/19 1800   OTHER   Last BM (prior to arrival ) --    Number of Times Stooled --  0

## 2019-03-24 NOTE — CARE PLAN
Problem: Skin Integrity  Goal: Risk for impaired skin integrity will decrease  Outcome: PROGRESSING AS EXPECTED    Intervention: Implement precautions to protect skin integrity in collaboration with the interdisciplinary team   03/24/19 5464   OTHER   Friction Interventions Draw Sheet / Pad Used for Repositioning   Patient Turns / Repositioning Patient Turns Self from Side to Side

## 2019-03-25 ENCOUNTER — HOME HEALTH ADMISSION (OUTPATIENT)
Dept: HOME HEALTH SERVICES | Facility: HOME HEALTHCARE | Age: 29
End: 2019-03-25
Payer: MEDICAID

## 2019-03-25 LAB
ANION GAP SERPL CALC-SCNC: 6 MMOL/L (ref 0–11.9)
ANION GAP SERPL CALC-SCNC: 6 MMOL/L (ref 0–11.9)
BACTERIA BLD CULT: NORMAL
BACTERIA BLD CULT: NORMAL
BASOPHILS # BLD AUTO: 0.5 % (ref 0–1.8)
BASOPHILS # BLD: 0.03 K/UL (ref 0–0.12)
BUN SERPL-MCNC: 12 MG/DL (ref 8–22)
BUN SERPL-MCNC: 14 MG/DL (ref 8–22)
CALCIUM SERPL-MCNC: 7.5 MG/DL (ref 8.5–10.5)
CALCIUM SERPL-MCNC: 7.8 MG/DL (ref 8.5–10.5)
CHLORIDE SERPL-SCNC: 102 MMOL/L (ref 96–112)
CHLORIDE SERPL-SCNC: 104 MMOL/L (ref 96–112)
CO2 SERPL-SCNC: 27 MMOL/L (ref 20–33)
CO2 SERPL-SCNC: 28 MMOL/L (ref 20–33)
CREAT SERPL-MCNC: 0.65 MG/DL (ref 0.5–1.4)
CREAT SERPL-MCNC: 0.67 MG/DL (ref 0.5–1.4)
EOSINOPHIL # BLD AUTO: 0.22 K/UL (ref 0–0.51)
EOSINOPHIL NFR BLD: 3.8 % (ref 0–6.9)
ERYTHROCYTE [DISTWIDTH] IN BLOOD BY AUTOMATED COUNT: 42.8 FL (ref 35.9–50)
GLUCOSE BLD-MCNC: 161 MG/DL (ref 65–99)
GLUCOSE BLD-MCNC: 202 MG/DL (ref 65–99)
GLUCOSE BLD-MCNC: 217 MG/DL (ref 65–99)
GLUCOSE BLD-MCNC: 231 MG/DL (ref 65–99)
GLUCOSE BLD-MCNC: 233 MG/DL (ref 65–99)
GLUCOSE BLD-MCNC: 246 MG/DL (ref 65–99)
GLUCOSE BLD-MCNC: 275 MG/DL (ref 65–99)
GLUCOSE BLD-MCNC: 319 MG/DL (ref 65–99)
GLUCOSE SERPL-MCNC: 258 MG/DL (ref 65–99)
GLUCOSE SERPL-MCNC: 270 MG/DL (ref 65–99)
HCT VFR BLD AUTO: 34.1 % (ref 37–47)
HGB BLD-MCNC: 11.4 G/DL (ref 12–16)
IMM GRANULOCYTES # BLD AUTO: 0.01 K/UL (ref 0–0.11)
IMM GRANULOCYTES NFR BLD AUTO: 0.2 % (ref 0–0.9)
LYMPHOCYTES # BLD AUTO: 1.85 K/UL (ref 1–4.8)
LYMPHOCYTES NFR BLD: 32.2 % (ref 22–41)
MCH RBC QN AUTO: 28.4 PG (ref 27–33)
MCHC RBC AUTO-ENTMCNC: 33.4 G/DL (ref 33.6–35)
MCV RBC AUTO: 85 FL (ref 81.4–97.8)
MONOCYTES # BLD AUTO: 0.67 K/UL (ref 0–0.85)
MONOCYTES NFR BLD AUTO: 11.7 % (ref 0–13.4)
NEUTROPHILS # BLD AUTO: 2.96 K/UL (ref 2–7.15)
NEUTROPHILS NFR BLD: 51.6 % (ref 44–72)
NRBC # BLD AUTO: 0 K/UL
NRBC BLD-RTO: 0 /100 WBC
PLATELET # BLD AUTO: 139 K/UL (ref 164–446)
PMV BLD AUTO: 10.6 FL (ref 9–12.9)
POTASSIUM SERPL-SCNC: 3.4 MMOL/L (ref 3.6–5.5)
POTASSIUM SERPL-SCNC: 3.7 MMOL/L (ref 3.6–5.5)
RBC # BLD AUTO: 4.01 M/UL (ref 4.2–5.4)
SIGNIFICANT IND 70042: NORMAL
SIGNIFICANT IND 70042: NORMAL
SITE SITE: NORMAL
SITE SITE: NORMAL
SODIUM SERPL-SCNC: 136 MMOL/L (ref 135–145)
SODIUM SERPL-SCNC: 137 MMOL/L (ref 135–145)
SOURCE SOURCE: NORMAL
SOURCE SOURCE: NORMAL
WBC # BLD AUTO: 5.7 K/UL (ref 4.8–10.8)

## 2019-03-25 PROCEDURE — A9270 NON-COVERED ITEM OR SERVICE: HCPCS | Performed by: INTERNAL MEDICINE

## 2019-03-25 PROCEDURE — A9270 NON-COVERED ITEM OR SERVICE: HCPCS | Performed by: HOSPITALIST

## 2019-03-25 PROCEDURE — 80048 BASIC METABOLIC PNL TOTAL CA: CPT | Mod: 91

## 2019-03-25 PROCEDURE — 700102 HCHG RX REV CODE 250 W/ 637 OVERRIDE(OP): Performed by: INTERNAL MEDICINE

## 2019-03-25 PROCEDURE — 700111 HCHG RX REV CODE 636 W/ 250 OVERRIDE (IP): Performed by: INTERNAL MEDICINE

## 2019-03-25 PROCEDURE — 700102 HCHG RX REV CODE 250 W/ 637 OVERRIDE(OP): Performed by: HOSPITALIST

## 2019-03-25 PROCEDURE — 700111 HCHG RX REV CODE 636 W/ 250 OVERRIDE (IP): Performed by: HOSPITALIST

## 2019-03-25 PROCEDURE — 99232 SBSQ HOSP IP/OBS MODERATE 35: CPT | Performed by: HOSPITALIST

## 2019-03-25 PROCEDURE — 85025 COMPLETE CBC W/AUTO DIFF WBC: CPT

## 2019-03-25 PROCEDURE — 82962 GLUCOSE BLOOD TEST: CPT | Mod: 91

## 2019-03-25 PROCEDURE — 770001 HCHG ROOM/CARE - MED/SURG/GYN PRIV*

## 2019-03-25 RX ORDER — POTASSIUM CHLORIDE 20 MEQ/1
40 TABLET, EXTENDED RELEASE ORAL ONCE
Status: COMPLETED | OUTPATIENT
Start: 2019-03-25 | End: 2019-03-25

## 2019-03-25 RX ORDER — DEXTROSE MONOHYDRATE 25 G/50ML
25 INJECTION, SOLUTION INTRAVENOUS
Status: DISCONTINUED | OUTPATIENT
Start: 2019-03-25 | End: 2019-03-26 | Stop reason: HOSPADM

## 2019-03-25 RX ORDER — METOCLOPRAMIDE 10 MG/1
10 TABLET ORAL
Status: DISCONTINUED | OUTPATIENT
Start: 2019-03-25 | End: 2019-03-26 | Stop reason: HOSPADM

## 2019-03-25 RX ORDER — FAMOTIDINE 20 MG/1
40 TABLET, FILM COATED ORAL DAILY
Status: DISCONTINUED | OUTPATIENT
Start: 2019-03-25 | End: 2019-03-26 | Stop reason: HOSPADM

## 2019-03-25 RX ADMIN — LORAZEPAM 0.5 MG: 2 INJECTION INTRAMUSCULAR; INTRAVENOUS at 02:03

## 2019-03-25 RX ADMIN — MORPHINE SULFATE 2 MG: 4 INJECTION INTRAVENOUS at 13:47

## 2019-03-25 RX ADMIN — MORPHINE SULFATE 2 MG: 4 INJECTION INTRAVENOUS at 06:08

## 2019-03-25 RX ADMIN — SENNOSIDES AND DOCUSATE SODIUM 2 TABLET: 8.6; 5 TABLET ORAL at 06:08

## 2019-03-25 RX ADMIN — INSULIN GLARGINE 20 UNITS: 100 INJECTION, SOLUTION SUBCUTANEOUS at 18:19

## 2019-03-25 RX ADMIN — OLANZAPINE 5 MG: 5 TABLET, ORALLY DISINTEGRATING ORAL at 18:22

## 2019-03-25 RX ADMIN — POTASSIUM CHLORIDE 40 MEQ: 1500 TABLET, EXTENDED RELEASE ORAL at 11:51

## 2019-03-25 RX ADMIN — ENOXAPARIN SODIUM 40 MG: 100 INJECTION SUBCUTANEOUS at 18:23

## 2019-03-25 RX ADMIN — INSULIN GLARGINE 20 UNITS: 100 INJECTION, SOLUTION SUBCUTANEOUS at 06:09

## 2019-03-25 RX ADMIN — INSULIN HUMAN 5 UNITS: 100 INJECTION, SOLUTION PARENTERAL at 06:09

## 2019-03-25 RX ADMIN — SENNOSIDES AND DOCUSATE SODIUM 2 TABLET: 8.6; 5 TABLET ORAL at 18:22

## 2019-03-25 RX ADMIN — FAMOTIDINE 20 MG: 10 INJECTION INTRAVENOUS at 06:09

## 2019-03-25 RX ADMIN — MORPHINE SULFATE 2 MG: 4 INJECTION INTRAVENOUS at 20:02

## 2019-03-25 RX ADMIN — METOCLOPRAMIDE 10 MG: 5 INJECTION, SOLUTION INTRAMUSCULAR; INTRAVENOUS at 06:09

## 2019-03-25 RX ADMIN — FAMOTIDINE 40 MG: 20 TABLET ORAL at 11:51

## 2019-03-25 ASSESSMENT — ENCOUNTER SYMPTOMS
NECK PAIN: 0
FEVER: 0
CHILLS: 0
NAUSEA: 1
CONSTIPATION: 0
VOMITING: 1
DOUBLE VISION: 0
PALPITATIONS: 0
WHEEZING: 0
HEMOPTYSIS: 0
SHORTNESS OF BREATH: 0
BLURRED VISION: 0
ABDOMINAL PAIN: 1
WEAKNESS: 1
BACK PAIN: 0
SPUTUM PRODUCTION: 0
BLOOD IN STOOL: 0

## 2019-03-25 NOTE — CARE PLAN
Problem: Bowel/Gastric:  Goal: Will not experience complications related to bowel motility   03/24/19 1600 03/24/19 1800   OTHER   Last BM (Prior to arrival) --    Number of Times Stooled --  0       Problem: Physical Regulation:  Goal: Will not experience complications related to bowel motility   03/24/19 1600 03/24/19 1800   OTHER   Last BM (Prior to arrival) --    Number of Times Stooled --  0

## 2019-03-25 NOTE — DISCHARGE PLANNING
Received Choice form at 1431  Agency/Facility Name: [facility]  Referral sent per Choice form @ 7648

## 2019-03-25 NOTE — DISCHARGE PLANNING
Agency/Facility Name: [facility]  Spoke To: Anup  Outcome: Patient referral received, pending review.

## 2019-03-25 NOTE — DISCHARGE PLANNING
Anticipated Discharge Disposition: d/c home w/ HH    Action: Received order for HH    Met w/ pt at bedside and acquired HH choice of [facility].    Pt has [place] Medicaid INS, and lives in [place], [place].    Pt will explain to her mother what HH entails, per mom's request.     LSw faxed completed choice to [facility] and advised of pending fax.    Barriers to Discharge: acceptance    Plan: f/u w/ [facility], medical team

## 2019-03-25 NOTE — PROGRESS NOTES
Monitor Summary: .14/.08/.36. Rhythm: SR-ST. Rate: 90's-120's. While ambulating HR up to 160's, back down to 100's with rest.     12 hour chart check,     2 RN skin check.

## 2019-03-25 NOTE — PROGRESS NOTES
[facility] Medicine Daily Progress Note    Date of Service  3/25/2019    Chief Complaint  28 y.o. female admitted 3/20/2019 with right flank pain.    Hospital Course    Ms Sparks has a history of insulin dependent diabetes.  She is frequently admitted for DKA.  She presented to the ER on 3/20/19 with right flank pain and nausea/vomiting.  The patient was found to be in DKA and was admitted to the ICU.  Her symptoms of intractable N/V have been slow to resolve.      Interval Problem Update  Transitioned to subcutaneous insulin yesterday, FSBG's 217-275  Still nauseated, but improved, tolerating liquid diet, wants to try solid few  No abdominal pain    Consultants/Specialty  Critical Care, I discussed the patient's condition with Dr. Benz today on ICU Rounds    Code Status  Full Code    Disposition  OK to transfer to medical floor    Review of Systems  Review of Systems   Constitutional: Positive for malaise/fatigue. Negative for chills and fever.   Eyes: Negative for blurred vision and double vision.   Respiratory: Negative for hemoptysis, sputum production, shortness of breath and wheezing.    Cardiovascular: Negative for chest pain and palpitations.   Gastrointestinal: Positive for abdominal pain, nausea and vomiting. Negative for blood in stool and constipation.   Genitourinary: Negative for dysuria, frequency and urgency.   Musculoskeletal: Negative for back pain and neck pain.   Skin: Negative for itching and rash.   Neurological: Positive for weakness.        Physical Exam  Temp:  [35.8 °C (96.5 °F)-37.2 °C (98.9 °F)] 35.9 °C (96.6 °F)  Pulse:  [103-132] 103  Resp:  [14-25] 20  SpO2:  [89 %-94 %] 93 %    Physical Exam   Constitutional: She is oriented to person, place, and time. She appears well-developed and well-nourished.   HENT:   Head: Normocephalic and atraumatic.   Eyes: Conjunctivae and EOM are normal. Right eye exhibits no discharge. Left eye exhibits no discharge.   Neck: Normal range of motion.  Neck supple.   Cardiovascular: Normal rate, regular rhythm and intact distal pulses.    No murmur heard.  2+ Radial Pulses  Brisk Capillary Refill   Pulmonary/Chest: Effort normal and breath sounds normal. No respiratory distress. She has no wheezes.   Abdominal: Soft. Bowel sounds are normal. She exhibits no distension. There is no tenderness. There is no rebound and no guarding.   Musculoskeletal: Normal range of motion. She exhibits no edema.   Neurological: She is alert and oriented to person, place, and time. No cranial nerve deficit.   Skin: Skin is warm and dry. She is not diaphoretic. No erythema.   Skin is warm and well perfused   Psychiatric: She has a normal mood and affect.       Fluids    Intake/Output Summary (Last 24 hours) at 03/25/19 0942  Last data filed at 03/25/19 0800   Gross per 24 hour   Intake          2433.96 ml   Output             1225 ml   Net          1208.96 ml       Laboratory  Recent Labs      03/23/19   0445  03/24/19   0140  03/25/19   0440   WBC  7.6  7.7  5.7   RBC  4.47  4.99  4.01*   HEMOGLOBIN  12.8  14.3  11.4*   HEMATOCRIT  37.1  41.6  34.1*   MCV  83.0  83.4  85.0   MCH  28.6  28.7  28.4   MCHC  34.5  34.4  33.4*   RDW  43.9  43.3  42.8   PLATELETCT  158*  175  139*   MPV  10.3  10.7  10.6     Recent Labs      03/24/19   1241  03/24/19   1811  03/25/19   0440   SODIUM  144  142  137   POTASSIUM  3.0*  3.8  3.4*   CHLORIDE  108  107  104   CO2  28  28  27   GLUCOSE  201*  212*  258*   BUN  10  10  12   CREATININE  0.63  0.65  0.65   CALCIUM  7.8*  8.0*  7.5*                   Imaging  CT-CHEST (THORAX) WITH   Final Result         1. Small, predominantly anterior pneumomediastinum, etiology uncertain. No air adjacent to trachea or esophagus to suggest tracheal/esophageal injury.   2. Ill-defined opacities in the dependent left lower lobe, atelectasis versus early evolving consolidations. Early pneumonia may have this appearance.   3. Trace perisplenic ascites.                DX-CHEST-PORTABLE (1 VIEW)   Final Result         1.  Hazy pulmonary opacities suggests subtle infiltrate.      CT-RENAL COLIC EVALUATION(A/P W/O)   Final Result         1.  Mediastinal air anteriorly, of uncertain etiology. Correlate with history, additional workup as clinically appropriate.   2.  Markedly distended bladder, consider bladder atony or bladder outlet obstruction as clinically appropriate. Incidentally catheter placement.      DX-CHEST-PORTABLE (1 VIEW)   Final Result         1.  No acute cardiopulmonary disease.           Assessment/Plan  * DKA (diabetic ketoacidoses) ([facility])- (present on admission)   Assessment & Plan    Acidosis is resolved     DM (diabetes mellitus) type 1, uncontrolled, with ketoacidosis (HCC)- (present on admission)   Assessment & Plan    Uncontrolled diabetes, she is frequently admitted for DKA, presents with the same  Likely due to non-compliance, complicated by gastroparesis  Her nausea vomiting is better today, advance to regular diet  Subcutaneous insulin, intensify sliding scale as she has hyperglycemia     Abnormal chest x-ray- (present on admission)   Assessment & Plan    Pneumonia has been ruled out  Antibiotics stopped     Gastroparesis- (present on admission)   Assessment & Plan    Severe nausea and vomiting uncontrolled  Slow to respond, a little better today, advance diet, change to PO reglan  Nightly zyprexa     CHERELLE (acute kidney injury) ([facility])- (present on admission)   Assessment & Plan    Prerenal, this is resolved     Pneumomediastinum ([facility])- (present on admission)   Assessment & Plan    Likely due to wretching  Remarkably symptom free  Conservative managment     Dyslipidemia- (present on admission)   Assessment & Plan    Statin          VTE prophylaxis: lovenox

## 2019-03-25 NOTE — CARE PLAN
Problem: Safety  Goal: Will remain free from falls  Outcome: PROGRESSING AS EXPECTED  Bed alarm set, hourly rounds on patient, call bell in reach.    Problem: Bowel/Gastric:  Goal: Will not experience complications related to bowel motility  Outcome: PROGRESSING AS EXPECTED      Problem: Physical Regulation:  Goal: Will not experience complications related to bowel motility  Outcome: PROGRESSING AS EXPECTED

## 2019-03-25 NOTE — DISCHARGE PLANNING
Kalen Wong at [facility] patient last saw Dr. Huber on 4/26/2018 and that PCP is no longer with the practice.  Patient would need to set up an appointment with Zeynep Laboy M.D. Prior to MD being willing to sign.  Either set patient up with an appointment through [facility] or a different MD office and then inform Home Health as we will require a PCP to sign and follow.  This referral will be placed on hold until receipt of above information.  Thank you.

## 2019-03-25 NOTE — PROGRESS NOTES
Pt's FSBG 319 @ 2100 from 202 @ 1930. Paged hospitalist Dr. Velazquez to stop the D5LR gtt. MD agreed, will recheck sugars to follow up.

## 2019-03-26 ENCOUNTER — PATIENT OUTREACH (OUTPATIENT)
Dept: HEALTH INFORMATION MANAGEMENT | Facility: OTHER | Age: 29
End: 2019-03-26

## 2019-03-26 VITALS
TEMPERATURE: 97 F | OXYGEN SATURATION: 93 % | SYSTOLIC BLOOD PRESSURE: 146 MMHG | HEIGHT: 65 IN | BODY MASS INDEX: 30.08 KG/M2 | HEART RATE: 107 BPM | WEIGHT: 180.56 LBS | RESPIRATION RATE: 22 BRPM | DIASTOLIC BLOOD PRESSURE: 98 MMHG

## 2019-03-26 LAB
ANION GAP SERPL CALC-SCNC: 10 MMOL/L (ref 0–11.9)
BASOPHILS # BLD AUTO: 0.3 % (ref 0–1.8)
BASOPHILS # BLD: 0.02 K/UL (ref 0–0.12)
BUN SERPL-MCNC: 13 MG/DL (ref 8–22)
CALCIUM SERPL-MCNC: 8 MG/DL (ref 8.5–10.5)
CHLORIDE SERPL-SCNC: 104 MMOL/L (ref 96–112)
CO2 SERPL-SCNC: 27 MMOL/L (ref 20–33)
CREAT SERPL-MCNC: 0.56 MG/DL (ref 0.5–1.4)
EOSINOPHIL # BLD AUTO: 0.36 K/UL (ref 0–0.51)
EOSINOPHIL NFR BLD: 5.9 % (ref 0–6.9)
ERYTHROCYTE [DISTWIDTH] IN BLOOD BY AUTOMATED COUNT: 42.5 FL (ref 35.9–50)
GLUCOSE BLD-MCNC: 152 MG/DL (ref 65–99)
GLUCOSE BLD-MCNC: 274 MG/DL (ref 65–99)
GLUCOSE BLD-MCNC: 277 MG/DL (ref 65–99)
GLUCOSE BLD-MCNC: 289 MG/DL (ref 65–99)
GLUCOSE BLD-MCNC: 52 MG/DL (ref 65–99)
GLUCOSE BLD-MCNC: 55 MG/DL (ref 65–99)
GLUCOSE SERPL-MCNC: 57 MG/DL (ref 65–99)
HCT VFR BLD AUTO: 34.2 % (ref 37–47)
HGB BLD-MCNC: 11.2 G/DL (ref 12–16)
IMM GRANULOCYTES # BLD AUTO: 0.04 K/UL (ref 0–0.11)
IMM GRANULOCYTES NFR BLD AUTO: 0.7 % (ref 0–0.9)
LYMPHOCYTES # BLD AUTO: 2.86 K/UL (ref 1–4.8)
LYMPHOCYTES NFR BLD: 47 % (ref 22–41)
MCH RBC QN AUTO: 28.1 PG (ref 27–33)
MCHC RBC AUTO-ENTMCNC: 32.7 G/DL (ref 33.6–35)
MCV RBC AUTO: 85.9 FL (ref 81.4–97.8)
MONOCYTES # BLD AUTO: 0.79 K/UL (ref 0–0.85)
MONOCYTES NFR BLD AUTO: 13 % (ref 0–13.4)
NEUTROPHILS # BLD AUTO: 2.02 K/UL (ref 2–7.15)
NEUTROPHILS NFR BLD: 33.1 % (ref 44–72)
NRBC # BLD AUTO: 0 K/UL
NRBC BLD-RTO: 0 /100 WBC
PLATELET # BLD AUTO: 142 K/UL (ref 164–446)
PMV BLD AUTO: 10.2 FL (ref 9–12.9)
POTASSIUM SERPL-SCNC: 3.2 MMOL/L (ref 3.6–5.5)
RBC # BLD AUTO: 3.98 M/UL (ref 4.2–5.4)
SODIUM SERPL-SCNC: 141 MMOL/L (ref 135–145)
WBC # BLD AUTO: 6.1 K/UL (ref 4.8–10.8)

## 2019-03-26 PROCEDURE — 99239 HOSP IP/OBS DSCHRG MGMT >30: CPT | Performed by: HOSPITALIST

## 2019-03-26 PROCEDURE — 700102 HCHG RX REV CODE 250 W/ 637 OVERRIDE(OP): Performed by: HOSPITALIST

## 2019-03-26 PROCEDURE — A9270 NON-COVERED ITEM OR SERVICE: HCPCS | Performed by: HOSPITALIST

## 2019-03-26 PROCEDURE — 700102 HCHG RX REV CODE 250 W/ 637 OVERRIDE(OP): Performed by: INTERNAL MEDICINE

## 2019-03-26 PROCEDURE — 82962 GLUCOSE BLOOD TEST: CPT | Mod: 91

## 2019-03-26 PROCEDURE — 700101 HCHG RX REV CODE 250: Performed by: INTERNAL MEDICINE

## 2019-03-26 PROCEDURE — 700111 HCHG RX REV CODE 636 W/ 250 OVERRIDE (IP): Performed by: HOSPITALIST

## 2019-03-26 PROCEDURE — A9270 NON-COVERED ITEM OR SERVICE: HCPCS | Performed by: INTERNAL MEDICINE

## 2019-03-26 PROCEDURE — 80048 BASIC METABOLIC PNL TOTAL CA: CPT

## 2019-03-26 PROCEDURE — 85025 COMPLETE CBC W/AUTO DIFF WBC: CPT

## 2019-03-26 RX ORDER — POTASSIUM CHLORIDE 20 MEQ/1
40 TABLET, EXTENDED RELEASE ORAL ONCE
Status: COMPLETED | OUTPATIENT
Start: 2019-03-26 | End: 2019-03-26

## 2019-03-26 RX ORDER — METOCLOPRAMIDE HYDROCHLORIDE 5 MG/5ML
10 SOLUTION ORAL
Qty: 150 ML | Refills: 1 | Status: SHIPPED | OUTPATIENT
Start: 2019-03-26 | End: 2019-01-01

## 2019-03-26 RX ORDER — INSULIN GLARGINE 100 [IU]/ML
30 INJECTION, SOLUTION SUBCUTANEOUS 2 TIMES DAILY
Qty: 1 PEN | Refills: 11 | Status: ON HOLD | OUTPATIENT
Start: 2019-03-26 | End: 2019-01-01 | Stop reason: SDUPTHER

## 2019-03-26 RX ORDER — ERYTHROMYCIN 250 MG/1
250 TABLET, DELAYED RELEASE ORAL
Qty: 120 TAB | Refills: 1 | Status: SHIPPED | OUTPATIENT
Start: 2019-03-26 | End: 2019-01-01

## 2019-03-26 RX ORDER — OLANZAPINE 5 MG/1
5 TABLET, ORALLY DISINTEGRATING ORAL EVERY EVENING
Qty: 30 TAB | Refills: 0 | Status: SHIPPED | OUTPATIENT
Start: 2019-03-26 | End: 2019-01-01

## 2019-03-26 RX ADMIN — POTASSIUM CHLORIDE 40 MEQ: 1500 TABLET, EXTENDED RELEASE ORAL at 11:36

## 2019-03-26 RX ADMIN — FAMOTIDINE 40 MG: 20 TABLET ORAL at 06:00

## 2019-03-26 RX ADMIN — DEXTROSE MONOHYDRATE 25 ML: 25 INJECTION, SOLUTION INTRAVENOUS at 06:00

## 2019-03-26 RX ADMIN — MORPHINE SULFATE 2 MG: 4 INJECTION INTRAVENOUS at 05:44

## 2019-03-26 RX ADMIN — SENNOSIDES AND DOCUSATE SODIUM 2 TABLET: 8.6; 5 TABLET ORAL at 06:00

## 2019-03-26 NOTE — CARE PLAN
Problem: Pain Management  Goal: Pain level will decrease to patient's comfort goal  Outcome: PROGRESSING AS EXPECTED  PRN meds available, pt knows to ask for med when needed    Problem: Nutritional:  Goal: Maintenance of adequate nutrition will improve  Outcome: PROGRESSING SLOWER THAN EXPECTED  Encourage liquids, and easy to eat snacks

## 2019-03-26 NOTE — CARE PLAN
Problem: Discharge Barriers/Planning  Goal: Patient's continuum of care needs will be met    Intervention: Assess potential discharge barriers on admission and throughout hospital stay  Pt says she needs some more meds before discharge, MDs updated in rounds      Problem: Psychosocial Needs:  Goal: Level of anxiety will decrease    Intervention: Identify and develop with patient strategies to cope with anxiety triggers  Pt encouraged to voice feelings about frequent DKA admits

## 2019-03-26 NOTE — DISCHARGE PLANNING
[facility] to accept this referral pending patient's completion of appointment to establish with Zeynep Laboy MD or Navi Huber M.D scheduled for 4/4/2019 @ 11: 30 am. RICHI Adhikari has been made aware that we will not see this patient until after she establishes with her PCP  Thank you!

## 2019-03-26 NOTE — DISCHARGE PLANNING
Anticipated Discharge Disposition: Home with mother. [facility] will see pt pending pt attends her upcoming PCP apt.     Action: [facility] updated that [facility] called stating pt has not seen her PCP in a year. LSW discussed pt's case with bedside RN and attending MD, [facility] ordered for DKA and diabetic intervention/education. LSW called [facility], spoke to Marycarmen and per Marycarmen pt saw Dr. Navi Huber 4/2018 and this MD no longer works at [facility]. The soonest Marycarmen can schedule a hospital f/u is next Thursday 4/4/19 at 11am with Dr. Zeynep Laboy. Marycarmen mentioned that they have attempted several times to contact pt for hospital f/u visits and pt does not return any of their calls.     LSW called [facility] and updated on above, [facility] can accept pending pt attends her PCP appointment next week. If pt attends, HC can see pt the following day Friday 4/5/19. Discussed with attending MD, he is fine with [facility] not seeing pt until next week pending pt attends her PCP apt.     LSW met with pt at bedside, pt states she feels she is in a better situation now and lives with her mother Flaquito #[phone]. Pt states she is currently not working and has Medicaid and gets food stamps. Pt does not have any monthly income and her mother is supporting her. Discussed upcoming PCP apt at [facility], pt states transportation can be an issue for her. Discussed MTM, pt states she has heard of this but never has been given a phone#. LSW provided pt with written instructions and phone# for MTM. Pt expressed understanding of needing to go to PCP apt. At [facility] so [facility] can see pt the following day. Pt provided permission to call her mother and leave a message as she is currently at work. LSW called pt's mother Flaquito #[phone] and left a message updating on above.     Barriers to Discharge: N/A.     Plan: As above, pt will need to attend her PCP apt. On 4/4/19 at 11am at [facility] in order for [facility] to see pt. MD in agreement with above. Pt provided with MTM  information and PCP information.       Care Transition Team Assessment    Information Source  Orientation : Oriented x 4  Information Given By: Patient  Informant's Name: Jovanna  Who is responsible for making decisions for patient? : Patient    Readmission Evaluation  Is this a readmission?: Yes - unplanned readmission  Why do you think you were readmitted?:  (pt is non-compliant)  Was an appointment arranged for you prior to discharge?: Yes, did not attend appointment  Why didn't you go to your appointment?: Other (comment) (non-compliant)  Did you have enough support after your last discharge?: Yes    Elopement Risk  Legal Hold: No  Ambulatory or Self Mobile in Wheelchair: Yes  Disoriented: No  Psychiatric Symptoms: None  History of Wandering: No  Elopement this Admit: No  Vocalizing Wanting to Leave: No  Displays Behaviors, Body Language Wanting to Leave: No-Not at Risk for Elopement  Elopement Risk: Not at Risk for Elopement    Interdisciplinary Discharge Planning  Primary Care Physician: ANGELIKA Laoby (pt has a hospital f/c scheduled for 4/4/19 at 11am)  Lives with - Patient's Self Care Capacity: Parents  Durable Medical Equipment: Not Applicable    Discharge Preparedness  What is your plan after discharge?: Home with help  What are your discharge supports?: Parent  Prior Functional Level: Ambulatory, Independent with Activities of Daily Living, Independent with Medication Management  Difficulity with ADLs: None  Difficulity with IADLs: None    Functional Assesment  Prior Functional Level: Ambulatory, Independent with Activities of Daily Living, Independent with Medication Management    Finances  Financial Barriers to Discharge: No  Prescription Coverage: Yes                   Domestic Abuse  Have you ever been the victim of abuse or violence?: No         Discharge Risks or Barriers  Discharge risks or barriers?: No PCP, Complex medical needs, Non-adherence to medication or treatment (PCP apt.  scheduled)  Patient risk factors: Complex medical needs, Noncompliance, No PCP (PCP apt. scheduled)    Anticipated Discharge Information  Anticipated discharge disposition: Home, [facility] (HHC pending if pt goes to her PCP apt. )  Discharge Address: [address] [place] [id number]  Discharge Contact Phone Number: [phone]

## 2019-03-26 NOTE — DISCHARGE INSTRUCTIONS
Discharge Instructions    Discharged to home by car with relative. Discharged via walking, hospital escort: Refused.  Special equipment needed: Not Applicable    Be sure to schedule a follow-up appointment with your primary care doctor or any specialists as instructed.     Discharge Plan:        I understand that a diet low in cholesterol, fat, and sodium is recommended for good health. Unless I have been given specific instructions below for another diet, I accept this instruction as my diet prescription.   Other diet: Diabetic    Special Instructions: None    · Is patient discharged on Warfarin / Coumadin?   No     Depression / Suicide Risk    As you are discharged from this [facility] facility, it is important to learn how to keep safe from harming yourself.    Recognize the warning signs:  · Abrupt changes in personality, positive or negative- including increase in energy   · Giving away possessions  · Change in eating patterns- significant weight changes-  positive or negative  · Change in sleeping patterns- unable to sleep or sleeping all the time   · Unwillingness or inability to communicate  · Depression  · Unusual sadness, discouragement and loneliness  · Talk of wanting to die  · Neglect of personal appearance   · Rebelliousness- reckless behavior  · Withdrawal from people/activities they love  · Confusion- inability to concentrate     If you or a loved one observes any of these behaviors or has concerns about self-harm, here's what you can do:  · Talk about it- your feelings and reasons for harming yourself  · Remove any means that you might use to hurt yourself (examples: pills, rope, extension cords, firearm)  · Get professional help from the community (Mental Health, Substance Abuse, psychological counseling)  · Do not be alone:Call your Safe Contact- someone whom you trust who will be there for you.  · Call your local CRISIS HOTLINE 320-4359 or 228-329-5189  · Call your local Children's Mobile  Crisis Response Team [place] (722) 982-2198 or www.[employer]  · Call the toll free National Suicide Prevention Hotlines   · National Suicide Prevention Lifeline 560-358-KGZX (5606)  · National Hope Line Network 800-SUICIDE (253-7918)    Diabetic Ketoacidosis  Diabetic ketoacidosis is a life-threatening complication of diabetes. If it is not treated, it can cause severe dehydration and organ damage and can lead to a coma or death.  What are the causes?  This condition develops when there is not enough of the hormone insulin in the body. Insulin helps the body to break down sugar for energy. Without insulin, the body cannot break down sugar, so it breaks down fats instead. This leads to the production of acids that are called ketones. Ketones are poisonous at high levels.  This condition can be triggered by:  · Stress on the body that is brought on by an illness.  · Medicines that raise blood glucose levels.  · Not taking diabetes medicine.  What are the signs or symptoms?  Symptoms of this condition include:  · Fatigue.  · Weight loss.  · Excessive thirst.  · Light-headedness.  · Fruity or sweet-smelling breath.  · Excessive urination.  · Vision changes.  · Confusion or irritability.  · Nausea.  · Vomiting.  · Rapid breathing.  · Abdominal pain.  · Feeling flushed.  How is this diagnosed?  This condition is diagnosed based on a medical history, a physical exam, and blood tests. You may also have a urine test that checks for ketones.  How is this treated?  This condition may be treated with:  · Fluid replacement. This may be done to correct dehydration.  · Insulin injections. These may be given through the skin or through an IV tube.  · Electrolyte replacement. Electrolytes, such as potassium and sodium, may be given in pill form or through an IV tube.  · Antibiotic medicines. These may be prescribed if your condition was caused by an infection.  Follow these instructions at home:  Eating and  drinking  · Drink enough fluids to keep your urine clear or pale yellow.  · If you cannot eat, alternate between drinking fluids with sugar (such as juice) and salty fluids (such as broth or bouillon).  · If you can eat, follow your usual diet and drink sugar-free liquids, such as water.  Other Instructions   · Take insulin as directed by your health care provider. Do not skip insulin injections. Do not use  insulin.  · If your blood sugar is over 240 mg/dL, monitor your urine ketones every 4-6 hours.  · If you were prescribed an antibiotic medicine, finish all of it even if you start to feel better.  · Rest and exercise only as directed by your health care provider.  · If you get sick, call your health care provider and begin treatment quickly. Your body often needs extra insulin to fight an illness.  · Check your blood glucose levels regularly. If your blood glucose is high, drink plenty of fluids. This helps to flush out ketones.  Contact a health care provider if:  · Your blood glucose level is too high or too low.  · You have ketones in your urine.  · You have a fever.  · You cannot eat.  · You cannot tolerate fluids.  · You have been vomiting for more than 2 hours.  · You continue to have symptoms of this condition.  · You develop new symptoms.  Get help right away if:  · Your blood glucose levels continue to be high (elevated).  · Your monitor reads “high” even when you are taking insulin.  · You faint.  · You have chest pain.  · You have trouble breathing.  · You have a sudden, severe headache.  · You have sudden weakness in one arm or one leg.  · You have sudden trouble speaking or swallowing.  · You have vomiting or diarrhea that gets worse after 3 hours.  · You feel severely fatigued.  · You have trouble thinking.  · You have abdominal pain.  · You are severely dehydrated. Symptoms of severe dehydration include:  ¨ Extreme thirst.  ¨ Dry mouth.  ¨ Blue lips.  ¨ Cold hands and feet.  ¨ Rapid  breathing.  This information is not intended to replace advice given to you by your health care provider. Make sure you discuss any questions you have with your health care provider.  Document Released: 12/15/2001 Document Revised: 05/25/2017 Document Reviewed: 11/25/2015  [employer] Interactive Patient Education © 2017 [employer]

## 2019-03-26 NOTE — PROGRESS NOTES
Hospital Medicine Daily Progress Note    Date of Service  3/26/2019    Chief Complaint  28 y.o. female admitted 3/20/2019 with ***    Hospital Course    ***      Interval Problem Update  ***    Consultants/Specialty  ***    Code Status  ***    Disposition  ***    Review of Systems  ROS     Physical Exam  Temp:  [35.7 °C (96.2 °F)-36.6 °C (97.8 °F)] 36.3 °C (97.4 °F)  Pulse:  [] 107  Resp:  [12-27] 27  SpO2:  [91 %-99 %] 93 %    Physical Exam    Fluids    Intake/Output Summary (Last 24 hours) at 03/26/19 0544  Last data filed at 03/25/19 2200   Gross per 24 hour   Intake             1152 ml   Output             2100 ml   Net             -948 ml       Laboratory  Recent Labs      03/24/19   0140  03/25/19   0440   WBC  7.7  5.7   RBC  4.99  4.01*   HEMOGLOBIN  14.3  11.4*   HEMATOCRIT  41.6  34.1*   MCV  83.4  85.0   MCH  28.7  28.4   MCHC  34.4  33.4*   RDW  43.3  42.8   PLATELETCT  175  139*   MPV  10.7  10.6     Recent Labs      03/24/19   1811  03/25/19   0440  03/25/19   1342   SODIUM  142  137  136   POTASSIUM  3.8  3.4*  3.7   CHLORIDE  107  104  102   CO2  28  27  28   GLUCOSE  212*  258*  270*   BUN  10  12  14   CREATININE  0.65  0.65  0.67   CALCIUM  8.0*  7.5*  7.8*                   Imaging  {Wetread or Wildcard:125182}     Assessment/Plan  * DKA (diabetic ketoacidoses) (HCC)- (present on admission)   Assessment & Plan    Acidosis is resolved     DM (diabetes mellitus) type 1, uncontrolled, with ketoacidosis (HCC)- (present on admission)   Assessment & Plan    Uncontrolled diabetes, she is frequently admitted for DKA, presents with the same  Likely due to non-compliance, complicated by gastroparesis  Her nausea vomiting is better today, advance to regular diet  Subcutaneous insulin, intensify sliding scale as she has hyperglycemia     Abnormal chest x-ray- (present on admission)   Assessment & Plan    Pneumonia has been ruled out  Antibiotics stopped     Gastroparesis- (present on admission)    Assessment & Plan    Severe nausea and vomiting uncontrolled  Slow to respond, a little better today, advance diet, change to PO reglan  Nightly zyprexa     CHERELLE (acute kidney injury) (HCC)- (present on admission)   Assessment & Plan    Prerenal, this is resolved     Pneumomediastinum (HCC)- (present on admission)   Assessment & Plan    Likely due to wretching  Remarkably symptom free  Conservative managment     Dyslipidemia- (present on admission)   Assessment & Plan    Statin          VTE prophylaxis: ***

## 2019-03-27 NOTE — DISCHARGE SUMMARY
Discharge Summary    CHIEF COMPLAINT ON ADMISSION  Chief Complaint   Patient presents with   • Flank Pain     bilat flank pain   • High Blood Sugar     412 at home, took insulin PTA,  per EMS   • N/V     x10 episodes today       Reason for Admission  EMS     Admission Date  3/20/2019    CODE STATUS  Full Code    HPI & HOSPITAL COURSE  This is a 28 y.o. female here with DKA.  This patient is well-known to us having been admitted quite frequently in the past for previous episodes of DKA.  She presented for evaluation of right flank pain with nausea and vomiting.  She had stopped taking her long-acting insulin due to nausea and vomiting.  Her initial evaluation was positive for small amount of pneumomediastinum as well as again presenting in DKA.    Patient was admitted to the ICU where she was started on a DKA protocol and her pneumomediastinum was observed closely.  She corrected rapidly as she always has in the past.  Her exam was benign as far as the pneumomediastinum is concerned.    Patient presents with quite frequent episodes of DKA almost weekly.  She always corrects easily while she is in-house and is maintained well on regimen here however when she goes home she decompensates frequently.  We have tried to set her up with home health however she does not have a primary care physician.  She last saw her primary Care physician about 1 year ago.  We have set her up with an appointment next week, and we have set home health nurse to visit her at home and hopefully prevent further episodes of DKA.  We will discharge her on her previous medications as it appears that they do work by everything we can determine when the patient is in house.       Therefore, she is discharged in good and stable condition to home with close outpatient follow-up.    The patient met 2-midnight criteria for an inpatient stay at the time of discharge.    Discharge Date  3/26/2019    FOLLOW UP ITEMS POST DISCHARGE  New PCP and with  Endocrinology    DISCHARGE DIAGNOSES  Principal Problem:    DKA (diabetic ketoacidoses) ([facility]) POA: Yes  Active Problems:    DM (diabetes mellitus) type 1, uncontrolled, with ketoacidosis (HCC) POA: Yes    Gastroparesis POA: Yes    Gastroparesis due to DM (HCC) POA: Yes    Abnormal chest x-ray POA: Yes    Dyslipidemia POA: Yes    Pneumomediastinum (HCC) POA: Yes    CHERELLE (acute kidney injury) ([facility]) POA: Yes  Resolved Problems:    GERD (gastroesophageal reflux disease) POA: Yes      FOLLOW UP  No future appointments.  Bruna Laboy M.D.  [address] [facility] [address]  [phone]    Go on 4/4/2019  Please arrive at 11:00am for your 11:30am appointment. Thank you      MEDICATIONS ON DISCHARGE     Medication List      START taking these medications      Instructions   OLANZapine zydis 5 MG Tbdp  Commonly known as:  ZYPREXA TBDP   Take 1 Tab by mouth every evening.  Dose:  5 mg        CONTINUE taking these medications      Instructions   atorvastatin 20 MG Tabs  Commonly known as:  LIPITOR   Take 1 Tab by mouth every day.  Dose:  20 mg     BASAGLAR KWIKPEN 100 UNIT/ML Sopn   Inject 30 Units as instructed 2 Times a Day.  Dose:  30 Units     Cholecalciferol 2000 UNIT Caps   Take 2,000 Units by mouth every day.  Dose:  2000 Units     erythromycin base 250 MG Tbec  Commonly known as:  UMU-TAB   Take 1 Tab by mouth 4 Times a Day,Before Meals and at Bedtime.  Dose:  250 mg     glucose 4 g 4 g Chew   Take 4 Tabs by mouth as needed for Low Blood Sugar (If FSBG is less than or equal to 70 mg/dL and patient able to eat or drink).  Dose:  16 g     insulin lispro (Human) 100 UNIT/ML Sopn injection  Commonly known as:  ADMELOG SOLOSTAR   Inject 2-32 Units as instructed 3 times a day before meals.  Dose:  2-32 Units     metoclopramide 10 MG/10ML Soln  Commonly known as:  REGLAN   Take 10 mL by mouth 3 times a day before meals.  Dose:  10 mg        STOP taking these medications    OMEGA 3 PO     omeprazole 20 MG delayed-release  capsule  Commonly known as:  PRILOSEC            Allergies  Allergies   Allergen Reactions   • Pcn [Penicillins] Shortness of Breath and Swelling     Per patient's Mom, patient tolerates Keflex   • Lisinopril Unspecified     Per historical: Reported pedal swelling. No facial/angioedema or rash nor respiratory symptoms.    • Promethazine Vomiting       DIET  Orders Placed This Encounter   Procedures   • Diet Order Diabetic     Standing Status:   Standing     Number of Occurrences:   1     Order Specific Question:   Diet:     Answer:   Diabetic [3]       ACTIVITY  As tolerated.  Weight bearing as tolerated    CONSULTATIONS      PROCEDURES  none    LABORATORY  Lab Results   Component Value Date    SODIUM 141 03/26/2019    POTASSIUM 3.2 (L) 03/26/2019    CHLORIDE 104 03/26/2019    CO2 27 03/26/2019    GLUCOSE 57 (L) 03/26/2019    BUN 13 03/26/2019    CREATININE 0.56 03/26/2019        Lab Results   Component Value Date    WBC 6.1 03/26/2019    HEMOGLOBIN 11.2 (L) 03/26/2019    HEMATOCRIT 34.2 (L) 03/26/2019    PLATELETCT 142 (L) 03/26/2019        Total time of the discharge process exceeds 34 minutes.  The majority of that time spent with the patient on the day of discharge reviewing discharge planning and follow-up.

## 2019-06-05 NOTE — ED NOTES
Pt to triage c/o SOB and chest pain radiating towards left to back after waking up this morning. (+) non productive fever. (+) low grade temp. Denies N/V/ and numbness/tingling.  Pt able to speak in full sentences. .Pt advised to return to triage nurse for any changes or concerns.    3-4 drinks

## 2019-07-08 PROBLEM — K92.0 HEMATEMESIS: Status: ACTIVE | Noted: 2019-01-01

## 2019-07-08 PROBLEM — E10.10 DIABETIC KETOACIDOSIS WITHOUT COMA ASSOCIATED WITH TYPE 1 DIABETES MELLITUS (HCC): Status: ACTIVE | Noted: 2019-03-20

## 2019-07-08 PROBLEM — E11.10 METABOLIC ACIDOSIS DUE TO DIABETES MELLITUS (HCC): Status: ACTIVE | Noted: 2017-09-22

## 2019-07-08 NOTE — CONSULTS
Critical Care Consultation    Date of consult: 7/8/2019    Referring Physician  Dr. Cazares    Reason for Consultation  Nausea vomiting and hyperglycemia    History of Presenting Illness  28 y.o. female with known history of poorly controlled IDDM with last A1c 3/2019 of 12.6, gastroparesis, and migraines.  Who presented 7/8/2019 with 1 day N/V/left flank pain.  Patient currently very poor historian and taking a lot of effort to get her to respond to questions however,  she indicates that she was in her normal state of health yesterday and 3 last night woke up this morning with nausea and vomiting as well as pain in her left side.  She denies any fever chills or sweats no sick contacts, indicates that she takes her insulin as instructed and checks her blood sugar regularly.  States that blood sugar was in normal range last night however this morning was registering very high and therefore presented to ER.    Code Status  Full Code    Review of Systems  Review of Systems   Constitutional: Positive for malaise/fatigue. Negative for chills and fever.   HENT: Negative for congestion and sore throat.    Eyes: Negative for blurred vision and double vision.   Respiratory: Negative for cough, sputum production and shortness of breath.    Cardiovascular: Negative for chest pain and palpitations.   Gastrointestinal: Positive for abdominal pain, nausea and vomiting. Negative for diarrhea.   Genitourinary: Positive for frequency. Negative for dysuria.   Musculoskeletal: Negative for myalgias.   Neurological: Positive for weakness. Negative for focal weakness.   Psychiatric/Behavioral: Negative for depression.   All other systems reviewed and are negative.      Past Medical History   has a past medical history of Backpain; Bronchitis; Diabetes type 1, controlled ([facility]); DKA (diabetic ketoacidoses) ([facility]); Fall; Gastroparesis; Kidney infection; Pneumonia; and UTI (urinary tract infection).    Surgical History   has a past surgical  history that includes gastroscopy-endo (9/18/2014); gastroscopy with biopsy (9/18/2014); other; submandible abscess incision and drainage (Left, 11/8/2017); dental extraction(s) (11/8/2017); and gastroscopy-endo (N/A, 6/9/2018).    Family History  family history includes Cancer in her unknown relative; Hypertension in her maternal grandfather.    Social History   reports that she has never smoked. She has never used smokeless tobacco. She reports that she does not drink alcohol or use drugs.    Medications  Home Medications     Reviewed by Blade Landry (Pharmacy Tech) on 07/08/19 at 1100  Med List Status: Complete   Medication Last Dose Status   Cholecalciferol 2000 UNIT Cap 7/7/2019 Active   Insulin Glargine (BASAGLAR KWIKPEN) 100 UNIT/ML Solution Pen-injector 7/8/2019 Active   insulin lispro (HUMALOG) 100 UNIT/ML Solution 7/7/2019 Active   SUMAtriptan (IMITREX) 50 MG Tab PRN Active              Current Facility-Administered Medications   Medication Dose Route Frequency Provider Last Rate Last Dose   • [START ON 7/9/2019] vitamin D (cholecalciferol) tablet 2,000 Units  2,000 Units Oral DAILY Reese Cazares M.D.       • SUMAtriptan (IMITREX) tablet 50 mg  50 mg Oral Once PRN Reese Cazares M.D.       • senna-docusate (PERICOLACE or SENOKOT S) 8.6-50 MG per tablet 2 Tab  2 Tab Oral BID Reese Cazares M.D.        And   • polyethylene glycol/lytes (MIRALAX) PACKET 1 Packet  1 Packet Oral QDAY PRN Reese Cazares M.D.        And   • magnesium hydroxide (MILK OF MAGNESIA) suspension 30 mL  30 mL Oral QDAY PRN Reese Cazares M.D.        And   • bisacodyl (DULCOLAX) suppository 10 mg  10 mg Rectal QDAY PRN Reese Cazares M.D.       • acetaminophen (TYLENOL) tablet 650 mg  650 mg Oral Q6HRS PRN Reese Cazares M.D.       • dextrose 10% and 0.45% NaCl infusion   Intravenous Continuous Reese Cazares M.D.       • MD ALERT-PHARMACY TO CONSULT FOR DKA MONITORING 1 Each  1 Each Other PRN Reese GE  BRIGIDA Cazares       • magnesium sulfate IVPB premix 2 g  2 g Intravenous Once PRN Reese Cazares M.D.        Or   • magnesium sulfate IVPB premix 4 g  4 g Intravenous Once PRN Reese Cazares M.D.       • potassium phosphates 30 mmol in  mL ivpb  30 mmol Intravenous Once PRN Reese Cazares M.D.        Or   • sodium phosphate 30 mmol in 1/2  mL ivpb  30 mmol Intravenous Once PRN Reese Cazares M.D.       • Adult DKA potassium(K+) replacement scale  1 Each Intravenous Q4HRS Reese Cazares M.D.       • 1/2 NS infusion   Intravenous Continuous Reese Cazares M.D.       • D5 1/2 NS infusion   Intravenous Continuous Reese Cazares M.D.       • ondansetron (ZOFRAN) syringe/vial injection 4 mg  4 mg Intravenous Q4HRS PRN Reese Cazares M.D.       • famotidine (PEPCID) injection 20 mg  20 mg Intravenous BID Reese Cazares M.D.       • insulin regular human (HUMULIN/NOVOLIN R) 62.5 Units in  mL Infusion for DKA  4 Units/hr Intravenous Continuous Reese Cazares M.D.       • insulin regular (HUMULIN R) injection 10 Units  10 Units Intravenous Once Hugo Joiner M.D.         Current Outpatient Prescriptions   Medication Sig Dispense Refill   • insulin lispro (HUMALOG) 100 UNIT/ML Solution Inject 10-18 Units as instructed 3 times a day before meals. Sliding Scale     • SUMAtriptan (IMITREX) 50 MG Tab Take 50 mg by mouth Once PRN for Migraine.     • Insulin Glargine (BASAGLAR KWIKPEN) 100 UNIT/ML Solution Pen-injector Inject 30 Units as instructed 2 Times a Day. 1 PEN 11   • Cholecalciferol 2000 UNIT Cap Take 2,000 Units by mouth every day.         Allergies  Allergies   Allergen Reactions   • Pcn [Penicillins] Shortness of Breath and Swelling     Per patient's Mom, patient tolerates Keflex   • Lisinopril Unspecified     Per historical: Reported pedal swelling. No facial/angioedema or rash nor respiratory symptoms.    • Promethazine Vomiting       Vital Signs last 24 hours  Temp:   [36.7 °C (98.1 °F)] 36.7 °C (98.1 °F)  Pulse:  [128-132] 129  Resp:  [15-27] 24  BP: (125)/(75) 125/75  SpO2:  [98 %-99 %] 98 %    Physical Exam  Physical Exam   Constitutional: She appears well-developed and well-nourished. She appears distressed.   Acutely ill-appearing   HENT:   Head: Normocephalic and atraumatic.   Eyes: Pupils are equal, round, and reactive to light. Conjunctivae are normal.   Neck: No tracheal deviation present.   Cardiovascular: Intact distal pulses.    Tachycardic   Pulmonary/Chest: She is in respiratory distress.   Hyperventilating   Abdominal: Soft. Bowel sounds are normal. She exhibits no distension. There is no tenderness.   Musculoskeletal: She exhibits no edema.   No flank pain to palpation   Neurological: No cranial nerve deficit.   Skin: Skin is warm and dry.   Psychiatric:   Somnolent   Nursing note and vitals reviewed.      Fluids    Intake/Output Summary (Last 24 hours) at 07/08/19 1208  Last data filed at 07/08/19 1101   Gross per 24 hour   Intake             1000 ml   Output                0 ml   Net             1000 ml       Laboratory  Recent Results (from the past 48 hour(s))   ACCU-CHEK GLUCOSE    Collection Time: 07/08/19  9:30 AM   Result Value Ref Range    Glucose - Accu-Ck 468 (HH) 65 - 99 mg/dL   CBC WITH DIFFERENTIAL    Collection Time: 07/08/19  9:38 AM   Result Value Ref Range    WBC 7.9 4.8 - 10.8 K/uL    RBC 4.73 4.20 - 5.40 M/uL    Hemoglobin 12.5 12.0 - 16.0 g/dL    Hematocrit 41.5 37.0 - 47.0 %    MCV 87.7 81.4 - 97.8 fL    MCH 26.4 (L) 27.0 - 33.0 pg    MCHC 30.1 (L) 33.6 - 35.0 g/dL    RDW 51.3 (H) 35.9 - 50.0 fL    Platelet Count 240 164 - 446 K/uL    MPV 10.5 9.0 - 12.9 fL    Neutrophils-Polys 63.70 44.00 - 72.00 %    Lymphocytes 26.60 22.00 - 41.00 %    Monocytes 8.00 0.00 - 13.40 %    Eosinophils 0.40 0.00 - 6.90 %    Basophils 0.50 0.00 - 1.80 %    Immature Granulocytes 0.80 0.00 - 0.90 %    Nucleated RBC 0.00 /100 WBC    Neutrophils (Absolute) 5.02 2.00  - 7.15 K/uL    Lymphs (Absolute) 2.09 1.00 - 4.80 K/uL    Monos (Absolute) 0.63 0.00 - 0.85 K/uL    Eos (Absolute) 0.03 0.00 - 0.51 K/uL    Baso (Absolute) 0.04 0.00 - 0.12 K/uL    Immature Granulocytes (abs) 0.06 0.00 - 0.11 K/uL    NRBC (Absolute) 0.00 K/uL   COMP METABOLIC PANEL    Collection Time: 07/08/19  9:38 AM   Result Value Ref Range    Sodium 141 135 - 145 mmol/L    Potassium 4.8 3.6 - 5.5 mmol/L    Chloride 109 96 - 112 mmol/L    Co2 6 (LL) 20 - 33 mmol/L    Anion Gap 26.0 (H) 0.0 - 11.9    Glucose 569 (HH) 65 - 99 mg/dL    Bun 16 8 - 22 mg/dL    Creatinine 0.82 0.50 - 1.40 mg/dL    Calcium 8.5 8.5 - 10.5 mg/dL    AST(SGOT) 42 12 - 45 U/L    ALT(SGPT) 25 2 - 50 U/L    Alkaline Phosphatase 108 (H) 30 - 99 U/L    Total Bilirubin 0.3 0.1 - 1.5 mg/dL    Albumin 3.4 3.2 - 4.9 g/dL    Total Protein 6.0 6.0 - 8.2 g/dL    Globulin 2.6 1.9 - 3.5 g/dL    A-G Ratio 1.3 g/dL   HCG Qual Serum    Collection Time: 07/08/19  9:38 AM   Result Value Ref Range    Beta-Hcg Qualitative Serum Negative Negative   BETA-HYDROXYBUTYRIC ACID    Collection Time: 07/08/19  9:38 AM   Result Value Ref Range    beta-Hydroxybutyric Acid >8.00 (H) 0.02 - 0.27 mmol/L   LIPASE    Collection Time: 07/08/19  9:38 AM   Result Value Ref Range    Lipase 13 11 - 82 U/L   ESTIMATED GFR    Collection Time: 07/08/19  9:38 AM   Result Value Ref Range    GFR If African American >60 >60 mL/min/1.73 m 2    GFR If Non African American >60 >60 mL/min/1.73 m 2       Imaging  CT-RENAL COLIC EVALUATION(A/P W/O)    (Results Pending)       Assessment/Plan  * Diabetic ketoacidosis without coma associated with type 1 diabetes mellitus (HCC)- (present on admission)   Assessment & Plan    No history of poorly controlled diabetes  Last A1c 3/2019 12.6  Initiate DKA protocol  Initiate insulin infusion immediately  Give 10 units IV insulin  Every 4 hours BMP  Every hour fingersticks  Keep n.p.o., IV fluids  Aggressive electrolyte replacement  Check UA and evaluate  for infectious etiology versus noncompliance versus other  Stat ABG     Metabolic acidosis due to diabetes mellitus (HCC)- (present on admission)   Assessment & Plan    Anion gap 26  IVF  Stat ABG  Stat institution of insulin infusion and insulin push  Every 4 hours BMPs  Initiate bicarb if pH less than 6.8  Currently attempting respiratory compensation given hyperventilatory state, monitor for fatigue and decompensation     Gastroparesis- (present on admission)   Assessment & Plan    N.p.o. for now  Symptom management with PRN antiemetics  IV fluid for hydration         Addendum  ABG to return with profound metabolic acidosis, currently partially respiratory compensated, given 2 A of bicarb given associated labile to low BP.  Remains on insulin infusion with active titration.  Patient had poor IV access and central line was required required.    Discussed patient condition and risk of morbidity and/or mortality with Hospitalist, RN, RT, Pharmacy and Patient.    The patient remains critically ill.  Critical care time = 80 minutes in directly providing and coordinating critical care and extensive data review.  No time overlap and excludes procedures.

## 2019-07-08 NOTE — DIETARY
Nutrition Services: Consult  Consult received for DM diet education.  Pt now up to floor from ED.  +DKA protocol.  RD to follow-up with DM diet instruction at a later date following diet advancement.

## 2019-07-08 NOTE — DISCHARGE PLANNING
Patient sleeping did not wake up at my srousing.  Mom answered some questions [facility] but mom has never gone with her to check.  Home care was arranged last admission but patient never recieved

## 2019-07-08 NOTE — PROGRESS NOTES
2 RN skin check complete with CAIT Monteiro.  Devices in place: ECG leads, 2 PIV, BP cuff, pulse oximeter, Pitt catheter, stat lock, SCDs  Skin assessed under devices listed above.  Eczema (verified by patient) on elbows and possibly around umbilicus. Red, flaky, excoriation, blanching on elbows.  Heels calloused, intact.  Scattered abrasions/scarring throughout.  New potential pressure ulcers noted on N/A.  The following interventions in place:  q2h turning and repositioning with pillows  Ensure all medical devices are padded and kept away from skin.   Low air-loss mattress  Heels floated

## 2019-07-08 NOTE — ED PROVIDER NOTES
"ED Provider Note    ED Provider Note    Primary care provider: Navi Huber M.D.  Means of arrival: EMS  History obtained from: Patient  History limited by: None    CHIEF COMPLAINT  Chief Complaint   Patient presents with   • Vomiting     N/V and Blood sugar in 480s. pt took 25u insulin at home and glucose still elevated. pt got 4 zofran and 1L in ambulance   • High Blood Sugar       HPI  Alis Sparks is a 28 y.o. female who presents to the Emergency Department via EMS with a chief complaint of nausea vomiting and elevated blood sugar.  Patient has a history of type 1 diabetes.  She is had diabetic ketoacidosis multiple times in the past.  Her primary care doctor is at the [facility].  When asked about control of her blood sugars regularly she says \"more or less\".  She does report that they are occasionally out of control.  She has been taking her insulin.  She has not run out of medications.  She reports mild URI symptoms but no UTI type symptoms.  No fever.  She does have abdominal pain which she reports is similar in character to previous times when she has had DKA.  Mother is at the bedside.  Patient's blood sugar was elevated for EMS greater than 400.    REVIEW OF SYSTEMS  Review of Systems   Constitutional: Negative for chills and fever.   HENT: Negative for congestion.    Respiratory: Negative for shortness of breath.    Cardiovascular: Negative for chest pain.   Gastrointestinal: Positive for abdominal pain, nausea and vomiting. Negative for blood in stool.   Genitourinary: Positive for frequency.   Musculoskeletal: Positive for back pain.   Neurological: Negative for headaches.   All other systems reviewed and are negative.      PAST MEDICAL HISTORY   has a past medical history of Backpain; Bronchitis; Diabetes type 1, controlled ([facility]); DKA (diabetic ketoacidoses) ([facility]); Fall; Gastroparesis; Kidney infection; Pneumonia; and UTI (urinary tract infection).    SURGICAL HISTORY   has a " "past surgical history that includes gastroscopy-endo (9/18/2014); gastroscopy with biopsy (9/18/2014); other; submandible abscess incision and drainage (Left, 11/8/2017); dental extraction(s) (11/8/2017); and gastroscopy-endo (N/A, 6/9/2018).    SOCIAL HISTORY  Social History   Substance Use Topics   • Smoking status: Never Smoker   • Smokeless tobacco: Never Used   • Alcohol use No      History   Drug Use No       FAMILY HISTORY  Family History   Problem Relation Age of Onset   • Cancer Unknown         breasts, multiple family members   • Hypertension Maternal Grandfather        CURRENT MEDICATIONS  Home Medications     Reviewed by Blade Landry (Pharmacy [employer]) on 07/08/19 at 1100  Med List Status: Complete   Medication Last Dose Status   Cholecalciferol 2000 UNIT Cap 7/7/2019 Active   Insulin Glargine (BASAGLAR KWIKPEN) 100 UNIT/ML Solution Pen-injector 7/8/2019 Active   insulin lispro (HUMALOG) 100 UNIT/ML Solution 7/7/2019 Active   SUMAtriptan (IMITREX) 50 MG Tab PRN Active                ALLERGIES  Allergies   Allergen Reactions   • Pcn [Penicillins] Shortness of Breath and Swelling     Per patient's Mom, patient tolerates Keflex   • Lisinopril Unspecified     Per historical: Reported pedal swelling. No facial/angioedema or rash nor respiratory symptoms.    • Promethazine Vomiting       PHYSICAL EXAM  VITAL SIGNS: /75   Pulse (!) 131   Temp 36.7 °C (98.1 °F)   Resp 15   Ht 1.651 m (5' 5\")   Wt 80.9 kg (178 lb 5.6 oz)   SpO2 99%   BMI 29.68 kg/m²   Vitals reviewed.  Constitutional: Patient is oriented to person, place, and time.  Ill-appearing   Head: Normocephalic and atraumatic.   Ears: Normal external ears bilaterally.   Mouth/Throat: Oropharynx is clear.  Dry mucous membranes   Eyes: Conjunctivae are normal. Pupils are equal, round, and reactive to light.   Neck: Normal range of motion. Neck supple.  Cardiovascular: Tachycardia, regular rhythm and normal heart sounds. Normal peripheral " pulses.  Pulmonary/Chest: Effort normal and breath sounds normal. No respiratory distress, no wheezes, rhonchi, or rales.   Abdominal: Soft. Bowel sounds are normal. There is diffuse tenderness. No rebound or guarding, or peritoneal signs.  Musculoskeletal: No edema and no tenderness.   Lymphadenopathy: No cervical adenopathy.   Neurological: No focal deficits.   Skin: Skin is warm and dry. No erythema. No pallor.   Psychiatric: Patient has a normal mood and affect, given circumstances     LABS  Results for orders placed or performed during the hospital encounter of 07/08/19   CBC WITH DIFFERENTIAL   Result Value Ref Range    WBC 7.9 4.8 - 10.8 K/uL    RBC 4.73 4.20 - 5.40 M/uL    Hemoglobin 12.5 12.0 - 16.0 g/dL    Hematocrit 41.5 37.0 - 47.0 %    MCV 87.7 81.4 - 97.8 fL    MCH 26.4 (L) 27.0 - 33.0 pg    MCHC 30.1 (L) 33.6 - 35.0 g/dL    RDW 51.3 (H) 35.9 - 50.0 fL    Platelet Count 240 164 - 446 K/uL    MPV 10.5 9.0 - 12.9 fL    Neutrophils-Polys 63.70 44.00 - 72.00 %    Lymphocytes 26.60 22.00 - 41.00 %    Monocytes 8.00 0.00 - 13.40 %    Eosinophils 0.40 0.00 - 6.90 %    Basophils 0.50 0.00 - 1.80 %    Immature Granulocytes 0.80 0.00 - 0.90 %    Nucleated RBC 0.00 /100 WBC    Neutrophils (Absolute) 5.02 2.00 - 7.15 K/uL    Lymphs (Absolute) 2.09 1.00 - 4.80 K/uL    Monos (Absolute) 0.63 0.00 - 0.85 K/uL    Eos (Absolute) 0.03 0.00 - 0.51 K/uL    Baso (Absolute) 0.04 0.00 - 0.12 K/uL    Immature Granulocytes (abs) 0.06 0.00 - 0.11 K/uL    NRBC (Absolute) 0.00 K/uL   COMP METABOLIC PANEL   Result Value Ref Range    Sodium 141 135 - 145 mmol/L    Potassium 4.8 3.6 - 5.5 mmol/L    Chloride 109 96 - 112 mmol/L    Co2 6 (LL) 20 - 33 mmol/L    Anion Gap 26.0 (H) 0.0 - 11.9    Glucose 569 (HH) 65 - 99 mg/dL    Bun 16 8 - 22 mg/dL    Creatinine 0.82 0.50 - 1.40 mg/dL    Calcium 8.5 8.5 - 10.5 mg/dL    AST(SGOT) 42 12 - 45 U/L    ALT(SGPT) 25 2 - 50 U/L    Alkaline Phosphatase 108 (H) 30 - 99 U/L    Total Bilirubin 0.3  0.1 - 1.5 mg/dL    Albumin 3.4 3.2 - 4.9 g/dL    Total Protein 6.0 6.0 - 8.2 g/dL    Globulin 2.6 1.9 - 3.5 g/dL    A-G Ratio 1.3 g/dL   HCG Qual Serum   Result Value Ref Range    Beta-Hcg Qualitative Serum Negative Negative   BETA-HYDROXYBUTYRIC ACID   Result Value Ref Range    beta-Hydroxybutyric Acid >8.00 (H) 0.02 - 0.27 mmol/L   LIPASE   Result Value Ref Range    Lipase 13 11 - 82 U/L   ESTIMATED GFR   Result Value Ref Range    GFR If African American >60 >60 mL/min/1.73 m 2    GFR If Non African American >60 >60 mL/min/1.73 m 2   ACCU-CHEK GLUCOSE   Result Value Ref Range    Glucose - Accu-Ck 468 (HH) 65 - 99 mg/dL       All labs reviewed by me.      COURSE & MEDICAL DECISION MAKING  Pertinent Labs & Imaging studies reviewed. (See chart for details)    Obtained and reviewed past medical records.  Patient's last encounter was an admission to the hospital in March of this year for flank pain and elevated blood sugar with nausea and vomiting.  Patient had a similar admission in January of this year.    10:17 AM - Patient seen and examined at bedside.  This unfortunately, is a 28-year-old female who presents with high blood sugar and nausea and vomiting.  She does have tachypnea.  Highly concerning for diabetic ketoacidosis which she reportedly has had in the past.  She is tachycardic.  She is had some hematemesis here in the department.  She complains of diffuse abdominal pain.  IV started per EMS.  She received approximately a liter of fluid in route.  IV fluids hydration will continue as therapy for DKA.  Labs drawn per nursing protocols.  Patient will be treated with additional antiemetics and pain medication.  She is afebrile.     10:45 AM patient still complaining of nausea on reevaluation.  Still tachycardic.  IV fluids infusing.  Will treat with Compazine.    11:10 AM patient's reevaluated bedside.  She still tachycardic although it slightly improved.  She still having blood in her vomitus and complains of  pain.  IV fluids infusing.  I spoken with Dr. Wesley, the hospitalist, who agrees to admit the patient to their service.  Patient's bicarbonate 6.  Anion gap is 26.  Electrolytes were normal.  She has a normal white blood cell count, normal H&H, normal platelet count and no neutrophilic shift.  Sugar was more than 500.  patient will go to the ICU but he states there is a protocol no need to call the intensivist at this time.  Having diffuse abdominal pain which I suspect at this time, I suspect, this is due to her DKA as is her vomiting and likely Virgen-Babin tear.  She is afebrile and her white blood cell count is normal.  Its unclear exactly, the nidus for this event.  Awaiting urine analysis.  We will establish a different additional IV access and provide additional antiemetics as the patient continues to be nauseated.  We will continue to monitor closely.  Repeat labs ordered.    Patient is admitted in critical condition.    The total critical care time on this patient is 35 minutes, resuscitating patient, speaking with admitting physician, and deciphering test results. This 35 minutes is exclusive of separately billable procedures.    FINAL IMPRESSION  1. Diabetic ketoacidosis without coma associated with type 1 diabetes mellitus (HCC)    2. Nausea and vomiting, intractability of vomiting not specified, unspecified vomiting type    3. Acute gastritis, presence of bleeding unspecified, unspecified gastritis type    4. Dehydration         Critical care time: 35 minutes

## 2019-07-08 NOTE — ASSESSMENT & PLAN NOTE
Recurrent, severe diabetic ketoacidosis  Life-threatening, continue ICU care  She is slow to resolve, this is typical for her hospital stay  Her symptoms of nausea vomiting have improved today, her anion gap is closed  Transition to subcutaneous insulin

## 2019-07-08 NOTE — PROCEDURES
"Date: 7/8/2019    Procedure:  Central Venous Catheter Insertion    Indication: access    Physician:  Hugo Joiner M.D.    Consent:  Emergent; time out performed    Procedure:  The patient was placed in the [place] position.  Appropriate \"time out\" was performed.  The right neck was prepped and draped in the usual sterile fashion.  Under full body sterile barrier precautions, a triple lumen central venous catheter was placed without difficulty in the right IJ position.  US was used for localization and placement.  All lumens were flushed with sterile saline and sterile caps were applied.  The line was sutured in place and a sterile dressing was applied.  There were no immediate complications.  A post-procedure CXR will be reviewed.  Estimated blood loss < 5cc.      "

## 2019-07-08 NOTE — DISCHARGE PLANNING
Called [facility] they made multiple attempts to contact patient and she would not return calls therefore dropped from service.

## 2019-07-08 NOTE — H&P
Hospital Medicine History & Physical Note    Date of Service  7/8/2019    Primary Care Physician  Pcp Not In Computer    Consultants      Code Status  full    Chief Complaint  Vomiting (N/V and Blood sugar in 480s. pt took 25u insulin at home and glucose still elevated. pt got 4 zofran and 1L in ambulance) and High Blood Sugar        History of Presenting Illness  28 y.o. female who presented 7/8/2019 with Vomiting (N/V and Blood sugar in 480s. pt took 25u insulin at home and glucose still elevated. pt got 4 zofran and 1L in ambulance) and High Blood Sugar  History of hospitalizations for diabetic ketoacidoses.  Histiry of diabetic gastroparesis    Patient was sleeping and did not want to participate much in the interview. Her mother was at bedside who told the story.  She was feeling malaised and naseous yesterday. She was vomiting last night. This morning she continued to have vomiting,. Reportedly it was blood tinged. Her mother was notified, who called 911.   So far her mother says she is compliant with her insulin. On record however, she was known to hold her insulin when she has vomiting spells. She does not know however what her blood sugars were at home. She has known gastroparesis. Mother says she does not think she had any sick contacts. No recent travel. No fevers or chills. Patient herself indicated she has abdominal and back discomfort.    At the ED, she is afebrile and hemodynamically stable. Bicarb 6. Glucose 500s. Elevatyed BOHbutyrate.  When I saw her at ED< sleepy but no acute distress. Mild tachypnea. Mother at bedside.    Review of Systems  Review of Systems   Constitutional: Negative for chills and fever.   HENT: Negative for congestion, hearing loss and nosebleeds.    Eyes: Negative for pain and redness.   Respiratory: Negative for cough, hemoptysis, shortness of breath and wheezing.    Cardiovascular: Negative for chest pain and palpitations.   Gastrointestinal: Positive for nausea and  vomiting. Negative for abdominal pain, blood in stool, constipation and diarrhea.   Genitourinary: Negative for dysuria, frequency and hematuria.   Musculoskeletal: Negative for falls, joint pain and myalgias.   Skin: Negative for rash.   Neurological: Negative for dizziness, tremors, focal weakness, seizures, loss of consciousness, weakness and headaches.   Psychiatric/Behavioral: The patient is not nervous/anxious and does not have insomnia.    All other systems reviewed and are negative.      Past Medical History   has a past medical history of Backpain; Bronchitis; Diabetes type 1, controlled ([facility]); DKA (diabetic ketoacidoses) ([facility]); Fall; Gastroparesis; Kidney infection; Pneumonia; and UTI (urinary tract infection).    Surgical History   has a past surgical history that includes gastroscopy-endo (9/18/2014); gastroscopy with biopsy (9/18/2014); other; submandible abscess incision and drainage (Left, 11/8/2017); dental extraction(s) (11/8/2017); and gastroscopy-endo (N/A, 6/9/2018).     Family History  family history includes Cancer in her unknown relative; Hypertension in her maternal grandfather.     Social History   reports that she has never smoked. She has never used smokeless tobacco. She reports that she does not drink alcohol or use drugs.    Allergies  Allergies   Allergen Reactions   • Pcn [Penicillins] Shortness of Breath and Swelling     Per patient's Mom, patient tolerates Keflex   • Lisinopril Unspecified     Per historical: Reported pedal swelling. No facial/angioedema or rash nor respiratory symptoms.    • Promethazine Vomiting       Medications  Prior to Admission Medications   Prescriptions Last Dose Informant Patient Reported? Taking?   Cholecalciferol 2000 UNIT Cap 7/7/2019 at AM Patient Yes No   Sig: Take 2,000 Units by mouth every day.   Insulin Glargine (BASAGLAR KWIKPEN) 100 UNIT/ML Solution Pen-injector 7/8/2019 at AM Patient No No   Sig: Inject 30 Units as instructed 2 Times a Day.    SUMAtriptan (IMITREX) 50 MG Tab PRN at PRN Patient Yes Yes   Sig: Take 50 mg by mouth Once PRN for Migraine.   insulin lispro (HUMALOG) 100 UNIT/ML Solution 7/7/2019 at UNK Patient Yes Yes   Sig: Inject 10-18 Units as instructed 3 times a day before meals. Sliding Scale      Facility-Administered Medications: None       Physical Exam  Temp:  [36.7 °C (98.1 °F)] 36.7 °C (98.1 °F)  Pulse:  [129-131] 131  Resp:  [15-21] 15  BP: (125)/(75) 125/75  SpO2:  [99 %] 99 %    Physical Exam   Constitutional: She appears well-developed.   HENT:   Head: Normocephalic and atraumatic.   Eyes: Conjunctivae are normal. No scleral icterus.   Neck: Normal range of motion. Neck supple.   Cardiovascular: Normal rate and regular rhythm.  Exam reveals no gallop and no friction rub.    No murmur heard.  Pulmonary/Chest: Breath sounds normal. She is in respiratory distress (Mild tachypnea). She has no wheezes. She has no rales.   Abdominal: Soft. Bowel sounds are normal. She exhibits no distension. There is tenderness (Mild discomfort). There is no rebound and no guarding.   Musculoskeletal: She exhibits tenderness (Mild back discomfort). She exhibits no edema.   Neurological: She is alert.   Somewhat somnolent   Skin: Skin is warm.   Psychiatric: She has a normal mood and affect. Her behavior is normal.   Withdrawn/guarded       Laboratory:  Recent Labs      07/08/19   0938   WBC  7.9   RBC  4.73   HEMOGLOBIN  12.5   HEMATOCRIT  41.5   MCV  87.7   MCH  26.4*   MCHC  30.1*   RDW  51.3*   PLATELETCT  240   MPV  10.5     Recent Labs      07/08/19   0938   SODIUM  141   POTASSIUM  4.8   CHLORIDE  109   CO2  6*   GLUCOSE  569*   BUN  16   CREATININE  0.82   CALCIUM  8.5     Recent Labs      07/08/19   0938   ALTSGPT  25   ASTSGOT  42   ALKPHOSPHAT  108*   TBILIRUBIN  0.3   LIPASE  13   GLUCOSE  569*                 No results for input(s): TROPONINI in the last 72 hours.    Urinalysis:    No results found     Imaging:  CT-RENAL COLIC  EVALUATION(A/P W/O)   Final Result         1.  Negative noncontrast CT scan of the abdomen and pelvis.      2.  No hydronephrosis or nephrolithiasis.      3.  Normal appearance of the appendix.      4.  Increased volume of stool in the distal colon. No evidence of bowel obstruction.      DX-CHEST-PORTABLE (1 VIEW)   Final Result      Right internal jugular catheter placement with the tip projecting over the right atrium. No pneumothorax is identified.            Assessment/Plan:  I anticipate this patient will require at least two midnights for appropriate medical management, necessitating inpatient admission.    * Diabetic ketoacidosis without coma associated with type 1 diabetes mellitus (HCC)- (present on admission)   Assessment & Plan    Ordered DKA protocol with insulin, BMP checks and IV fluids  Patient is normonatremic. In this setting she is likely hypernatremic. Therefore will do half normal saline for fluids. Because of this as well, I have spoken with Pharmacy to start low dose insulin drip; avoid dropping blood sugars too low to avoid rapid osmolar shifts; usually we try to drop blood sugars by not more than 100 per hour.     Gastroparesis- (present on admission)   Assessment & Plan    Dure to diabetes  Ordered antiemetics  Ordered CT Ab for back and flank pain     Hematemesis   Assessment & Plan    Mild, blood tinged.  None now  Likely small Virgen Babin tear from excessive vomiting.  Monitor H/H and symptoms for now  Ordered IV famotidine  Ordered antiemetics         VTE prophylaxis: SCDs, ambulatory. Had an episode of blood tinged vomit due to excessive vomiting, so avoiding pharmacologic DVT prophylaxis

## 2019-07-08 NOTE — ASSESSMENT & PLAN NOTE
N.p.o. for now  Symptom management with PRN antiemetics  IV fluid for hydration  Scheduled Reglan, Zyprexa, and erythromycin

## 2019-07-08 NOTE — ASSESSMENT & PLAN NOTE
history of poorly controlled diabetes  Last A1c 3/2019 12.6  Continue with insulin infusion and every hour fingersticks  Continue every 4 hours BMP  Continue IV fluids and keep n.p.o.  Aggressive electrolyte replacement

## 2019-07-08 NOTE — ASSESSMENT & PLAN NOTE
Previous gastric emptying study was actually normal has had EGD in the past with dilation as well  Despite a normal gastric emptying study, I think the overall picture seems consistent with gastroparesis with an acute exacerbation  Hydration, blood sugar control, antiemetics  Orally dissolving Zyprexa  Some improvement in symptoms today, cautiously advance her diet

## 2019-07-08 NOTE — PROGRESS NOTES
PICC team notified via voicemail message for midline placement. MD at bedside for STAT central line placement d/t patient decompensation, HR sustaining 160s, BP 80s, RR 30s, increasing lethargy.

## 2019-07-08 NOTE — ED NOTES
Med Rec completed per patient and home pharmacy's ([facility] and [facility])   Allergies reviewed  No ORAL antibiotics in last 14 days    Patient states that she took her Basaglar Insulin this morning. This was last filled for a 30 day supply 3/2019

## 2019-07-08 NOTE — PROGRESS NOTES
Pt transported from Red 09 to S131 at 1235 via ACLS RN and CCT, on monitor. Patient's belongings in belongings bag (pants/underwear), other belongings with patient's mother, per ED RN. Awaiting RT to come bedside for STAT ABG.

## 2019-07-09 NOTE — CARE PLAN
Problem: Psychosocial needs  Goal: Anxiety reduction  Patient updated on plan of care. Reassurance provided.    Problem: Skin Integrity  Goal: Skin Integrity is maintained or improved    Intervention: Eduardo Skin Risk Assessment  Completed, documented.

## 2019-07-09 NOTE — CARE PLAN
Problem: Fluid Volume:  Goal: Will maintain balanced intake and output  Outcome: PROGRESSING AS EXPECTED  Pt received 5L total in course of DKA treatment thus far. Has maintained adequate Urine output   Intervention: Monitor, educate, and encourage compliance with therapeutic intake of liquids  Done      Problem: Pain Management  Goal: Pain level will decrease to patient's comfort goal  Outcome: PROGRESSING AS EXPECTED  Pt complains of persistent abdominal pain at a 6/10 level. PRN oxy given with some improvement     Intervention: Follow pain managment plan developed in collaboration with patient and Interdisciplinary Team  Done  Intervention: Educate and implement non-pharmacologic comfort measures. Examples: relaxation, distration, play therapy, activity therapy, massage, etc.  Done

## 2019-07-09 NOTE — PROGRESS NOTES
Patient with intractable nausea failing zofran QTc 412 will start reglan prn. Still on continuous insulin gtt with active titration.   Called by nurse since Gap closed with stable insulin gtt at 4units. Will continue to have 2 chem panel closed patient still with nausea so may be unable to eat consistently. Will monitor closely with likely transition off gtt tomorrow if Nausea/vomiting improved.   Will transition to 180 insulin or critical illness insulin protocol and gtt. Will place on D5LR and will have k replace scales. Patient well know to me and our ICU for frequent ICU admission.    Patient remains in critical condition from DKA on insulin gtt with active titration . Critical care time provided was 30 minutes. This excludes all separate billable procedures.

## 2019-07-09 NOTE — PROGRESS NOTES
2 RN skin check complete with CATI Monteiro.  Devices in place: ECG leads, R IJ central line, BP cuff, pulse oximeter, Pitt catheter, stat lock, SCDs (refused, education provided)  Skin assessed under devices listed above.  Eczema (verified by patient) on elbows and possibly around umbilicus. Red, flaky, excoriation, blanching on elbows.  Heels calloused, intact.   Scattered abrasions/scarring throughout.   New potential pressure ulcers noted on N/A.   The following interventions in place:  q2h turning and repositioning with pillows  Ensure all medical devices are padded and kept away from skin  Low air-loss mattress  Heels floated

## 2019-07-09 NOTE — PROGRESS NOTES
Intensive Care Insulin infusion protocol started per MD order at 0100 at 1 unit/hr per initiation instructions with FSBG at 110. No rate changes made between 9086-2107 d/t misinterpretation of titration instructions for new insulin gtt. QEYQ=167 at 0400 and rate increased to 2 units/hr per CRN recommendation related to Intensive care insulin infusion protocol. Will continue to monitor BG q1 hr and make appropriate changes to gtt.

## 2019-07-09 NOTE — CARE PLAN
Problem: Safety  Goal: Free from accidental injury    Intervention: Initiate Safety Measures  Bed in low, locked position, near nursing station, call light within reach. Bed alarm activated, appropriate fall identifiers in place.      Problem: Psychosocial needs  Goal: Anxiety reduction    Intervention: Identify and remove anxiety triggers  Patient and patient's mother updated on patient's status and plan of care. All questions at this time answered.

## 2019-07-09 NOTE — PROGRESS NOTES
Critical Care Progress Note    Date of admission  7/8/2019    Chief Complaint  28 y.o. female admitted 7/8/2019 with DKA    Hospital Course    28 y.o. female with known history of poorly controlled IDDM with last A1c 3/2019 of 12.6, gastroparesis, and migraines.  Who presented 7/8/2019 with 1 day N/V/left flank pain.  Patient currently very poor historian and taking a lot of effort to get her to respond to questions however,  she indicates that she was in her normal state of health yesterday and 3 last night woke up this morning with nausea and vomiting as well as pain in her left side.  She denies any fever chills or sweats no sick contacts, indicates that she takes her insulin as instructed and checks her blood sugar regularly.  States that blood sugar was in normal range last night however this morning was registering very high and therefore presented to ER.      Interval Problem Update  Reviewed last 24 hour events:  T-max 99.1  +4.1 L over last 24 hours  CT renal colic 7/8 reviewed  K3.7  AG 12  CO2 15    Insulin infusion@1.5  D5 LR@100    95% on RA  Last BM PTA    Addendum  Currently increasing with worsening CO2.  At this time will need to uptitrate insulin and continue with every 4 hour BMPs.  Continue with q. one hour FS BSs and active titration of insulin infusion    Review of Systems  Review of Systems   Constitutional: Negative for chills, fever and malaise/fatigue.   HENT: Negative for congestion.    Eyes: Negative for blurred vision and double vision.   Respiratory: Negative for cough, sputum production and shortness of breath.    Cardiovascular: Negative for chest pain and palpitations.   Gastrointestinal: Positive for nausea and vomiting. Negative for abdominal pain.   Neurological: Negative for focal weakness.   Psychiatric/Behavioral: Negative for depression.   All other systems reviewed and are negative.       Vital Signs for last 24 hours   Temp:  [36.6 °C (97.8 °F)-37.3 °C (99.1 °F)] 37 °C (98.6  °F)  Pulse:  [] 94  Resp:  [1-46] 17  BP: (125)/(75) 125/75  SpO2:  [93 %-100 %] 95 %    Hemodynamic parameters for last 24 hours       Respiratory Information for the last 24 hours       Physical Exam   Physical Exam   Constitutional: She appears well-developed.   HENT:   Head: Normocephalic and atraumatic.   Eyes: Pupils are equal, round, and reactive to light. Conjunctivae are normal.   Neck: No tracheal deviation present.   Cardiovascular: Normal rate and intact distal pulses.    Pulmonary/Chest: She has no wheezes. She has no rales.   Abdominal: Soft. She exhibits no distension. There is no tenderness.   Musculoskeletal: She exhibits no edema.   Neurological: No cranial nerve deficit.   Skin: Skin is warm and dry.   Psychiatric: She has a normal mood and affect.   Nursing note and vitals reviewed.      Medications  Current Facility-Administered Medications   Medication Dose Route Frequency Provider Last Rate Last Dose   • D5LR infusion   Intravenous Continuous Hugo Benz M.D. 100 mL/hr at 07/09/19 0133     • insulin regular human (HUMULIN/NOVOLIN R) 62.5 Units in  mL infusion per protocol  0-29 Units/hr Intravenous Continuous Hugo Benz M.D. 6 mL/hr at 07/09/19 0630 1.5 Units/hr at 07/09/19 0630   • DEXTROSE 10% BOLUS 125-250 mL  125-250 mL Intravenous PRN Hugo Benz M.D.       • K+ Scale: Goal of 4.5  1 Each Intravenous Q6HRS Hugo Benz M.D.   1 Each at 07/09/19 0145   • potassium chloride in water (KCL) ivpb **Administer in ICU only** 20 mEq  20 mEq Intravenous Once Hugo Benz M.D.       • potassium phosphates 15 mmol in D5W 500 mL ivpb  15 mmol Intravenous Once Hugo Benz M.D. 125 mL/hr at 07/09/19 0338 15 mmol at 07/09/19 0338   • vitamin D (cholecalciferol) tablet 2,000 Units  2,000 Units Oral DAILY Reese Cazares M.D.   2,000 Units at 07/09/19 0501   • SUMAtriptan (IMITREX) tablet 50 mg  50 mg Oral Once PRN Reese Cazares M.D.       •  senna-docusate (PERICOLACE or SENOKOT S) 8.6-50 MG per tablet 2 Tab  2 Tab Oral BID Reese Cazares M.D.   2 Tab at 07/09/19 0501    And   • polyethylene glycol/lytes (MIRALAX) PACKET 1 Packet  1 Packet Oral QDAY PRN Reese Cazares M.D.        And   • magnesium hydroxide (MILK OF MAGNESIA) suspension 30 mL  30 mL Oral QDAY PRN Reese Cazares M.D.        And   • bisacodyl (DULCOLAX) suppository 10 mg  10 mg Rectal QDAY PRN Reese Cazares M.D.       • acetaminophen (TYLENOL) tablet 650 mg  650 mg Oral Q6HRS PRN Reese Cazares M.D.       • ondansetron (ZOFRAN) syringe/vial injection 4 mg  4 mg Intravenous Q4HRS PRN Reese Cazares M.D.   4 mg at 07/09/19 0217   • famotidine (PEPCID) injection 20 mg  20 mg Intravenous BID Reese Cazares M.D.   20 mg at 07/09/19 0500   • oxyCODONE immediate-release (ROXICODONE) tablet 5 mg  5 mg Oral Q4HRS PRN Hugo Joiner M.D.   5 mg at 07/08/19 2304   • metoclopramide (REGLAN) injection 5 mg  5 mg Intravenous Q6HRS PRN Hugo Benz M.D.   5 mg at 07/09/19 0402       Fluids    Intake/Output Summary (Last 24 hours) at 07/09/19 0655  Last data filed at 07/09/19 0600   Gross per 24 hour   Intake          7370.19 ml   Output             3211 ml   Net          4159.19 ml       Laboratory  Recent Labs      07/08/19   1326   ISTATAPH  6.965*   ISTATAPCO2  10.9*   ISTATAPO2  114*   ISTATATCO2  <5*   [id number]  95   ISTATARTHCO3  2.5*   ISTATARTBE  -28*   ISTATTEMP  97.7 F   ISTATFIO2  21   ISTATSPEC  Arterial   ISTATAPHTC  6.971*   [id number]  111*         Recent Labs      07/08/19   1315   07/08/19   2140  07/09/19   0120  07/09/19   0555   SODIUM  145   < >  150*  150*  146*   POTASSIUM  3.9   < >  3.8  3.7  3.7   CHLORIDE  115*   < >  121*  122*  119*   CO2  <5*   < >  18*  20  15*   BUN  16   < >  10  8  9   CREATININE  0.89   < >  0.71  0.63  0.77   MAGNESIUM  1.7   --    --   2.2   --    PHOSPHORUS  3.5   --    --   1.1*   --    CALCIUM  8.4*   < >  8.4*   8.3*  8.3*    < > = values in this interval not displayed.     Recent Labs      07/08/19   0938  07/08/19   1315   07/08/19   2140  07/09/19   0120  07/09/19   0555   ALTSGPT  25  22   --    --    --    --    ASTSGOT  42  35   --    --    --    --    ALKPHOSPHAT  108*  121*   --    --    --    --    TBILIRUBIN  0.3  0.2   --    --    --    --    LIPASE  13   --    --    --    --    --    GLUCOSE  569*  534*   < >  133*  105*  205*    < > = values in this interval not displayed.     Recent Labs      07/08/19   0938  07/08/19   1315  07/09/19   0405   WBC  7.9  16.6*  8.9   NEUTSPOLYS  63.70  80.40*   --    LYMPHOCYTES  26.60  10.30*   --    MONOCYTES  8.00  7.70   --    EOSINOPHILS  0.40  0.10   --    BASOPHILS  0.50  0.40   --    ASTSGOT  42  35   --    ALTSGPT  25  22   --    ALKPHOSPHAT  108*  121*   --    TBILIRUBIN  0.3  0.2   --      Recent Labs      07/08/19   0938  07/08/19   1315  07/09/19   0405   RBC  4.73  4.66  4.30   HEMOGLOBIN  12.5  12.8  11.2*   HEMATOCRIT  41.5  43.4  36.8*   PLATELETCT  240  283  233       Imaging  CT:    Reviewed    Assessment/Plan  * Diabetic ketoacidosis without coma associated with type 1 diabetes mellitus (HCC)- (present on admission)   Assessment & Plan    history of poorly controlled diabetes  Last A1c 3/2019 12.6  Continue DKA protocol  Continue insulin infusion with active titration and q. one hour fingerstick  Every 4 hours BMP  Continue IV fluids and keep n.p.o.  Aggressive electrolyte replacement     Metabolic acidosis due to diabetes mellitus (HCC)- (present on admission)   Assessment & Plan    Improving with insulin infusion  Every 4 hours BMP     Gastroparesis- (present on admission)   Assessment & Plan    N.p.o. for now  Symptom management with PRN antiemetics  IV fluid for hydration          VTE:  Contraindicated  Ulcer: H2 Antagonist  Lines: Central Line  Ongoing indication addressed and Pitt Catheter  Ongoing indication addressed    I have performed a physical  exam and reviewed and updated ROS and Plan today (7/9/2019). In review of yesterday's note (7/8/2019), there are no changes except as documented above.     Discussed patient condition and risk of morbidity and/or mortality with Hospitalist, RN, RT, Pharmacy and Patient  The patient remains critically ill.  Critical care time = 40 minutes in directly providing and coordinating critical care and extensive data review.  No time overlap and excludes procedures.

## 2019-07-09 NOTE — PROGRESS NOTES
[facility] Medicine Daily Progress Note    Date of Service  7/9/2019    Chief Complaint  28 y.o. female admitted 7/8/2019 with vomiting.    Hospital Course    Ms Sparks has a history of type I diabetes, she is frequently admitted for DKA.  Patient was brought the the emergency room on 7/8/2019 with nausea, vomiting and high blood sugar.  She was admitted to the ICU for DKA protocol       Interval Problem Update  Complains of left sided abdominal pain, better with oxycodone  Nauseated all night, multiple episodes of emesis, dark brown, limited relief with IV compazine and reglan  Remains on insulin drip, on 3.5 units/hr    Consultants/Specialty  Critical Care, I discussed the patient's condition with Dr. Joiner this morning on ICU Rounds    Code Status  Full Code    Disposition  ICU    Review of Systems  Review of Systems   Constitutional: Positive for malaise/fatigue. Negative for chills.   Respiratory: Negative for cough, hemoptysis and sputum production.    Cardiovascular: Negative for chest pain, palpitations and orthopnea.   Gastrointestinal: Positive for abdominal pain, nausea and vomiting.   Musculoskeletal: Positive for back pain. Negative for myalgias and neck pain.   Skin: Negative for itching and rash.   Neurological: Negative for dizziness.   All other systems reviewed and are negative.       Physical Exam  Temp:  [36.6 °C (97.8 °F)-37.3 °C (99.1 °F)] 37 °C (98.6 °F)  Pulse:  [] 129  Resp:  [1-46] 41  BP: (125)/(75) 125/75  SpO2:  [93 %-100 %] 95 %    Physical Exam   Constitutional: She is oriented to person, place, and time. She appears well-developed and well-nourished.   HENT:   Head: Normocephalic and atraumatic.   Eyes: Conjunctivae and EOM are normal. Right eye exhibits no discharge. Left eye exhibits no discharge.   Cardiovascular: Normal rate, regular rhythm and intact distal pulses.    No murmur heard.  2+ Radial Pulses  Brisk Capillary Refill   Pulmonary/Chest: Effort normal and breath  sounds normal. No respiratory distress. She has no wheezes.   Abdominal: Soft. Bowel sounds are normal. She exhibits no distension. There is no tenderness. There is no rebound.   Musculoskeletal: Normal range of motion. She exhibits no edema.   Neurological: She is oriented to person, place, and time. No cranial nerve deficit.   Sleepy   Skin: Skin is warm and dry. She is not diaphoretic. No erythema.   Skin is warm and well perfused   Psychiatric: She has a normal mood and affect.       Fluids    Intake/Output Summary (Last 24 hours) at 07/09/19 0747  Last data filed at 07/09/19 0600   Gross per 24 hour   Intake          7370.19 ml   Output             3211 ml   Net          4159.19 ml       Laboratory  Recent Labs      07/08/19   0938  07/08/19   1315  07/09/19   0405   WBC  7.9  16.6*  8.9   RBC  4.73  4.66  4.30   HEMOGLOBIN  12.5  12.8  11.2*   HEMATOCRIT  41.5  43.4  36.8*   MCV  87.7  93.1  85.6   MCH  26.4*  27.5  26.0*   MCHC  30.1*  29.5*  30.4*   RDW  51.3*  54.6*  51.9*   PLATELETCT  240  283  233   MPV  10.5  10.2  10.1     Recent Labs      07/08/19   2140  07/09/19   0120  07/09/19   0555   SODIUM  150*  150*  146*   POTASSIUM  3.8  3.7  3.7   CHLORIDE  121*  122*  119*   CO2  18*  20  15*   GLUCOSE  133*  105*  205*   BUN  10  8  9   CREATININE  0.71  0.63  0.77   CALCIUM  8.4*  8.3*  8.3*                   Imaging  CT-RENAL COLIC EVALUATION(A/P W/O)   Final Result         1.  Negative noncontrast CT scan of the abdomen and pelvis.      2.  No hydronephrosis or nephrolithiasis.      3.  Normal appearance of the appendix.      4.  Increased volume of stool in the distal colon. No evidence of bowel obstruction.      DX-CHEST-PORTABLE (1 VIEW)   Final Result      Right internal jugular catheter placement with the tip projecting over the right atrium. No pneumothorax is identified.           Assessment/Plan  * Diabetic ketoacidosis without coma associated with type 1 diabetes mellitus (HCC)- (present on  admission)   Assessment & Plan    Recurrent, severe diabetic ketoacidosis  Life-threatening, continue ICU care  Insulin drip, titrate to have serial BMPs and Accu-Chek's     Gastroparesis- (present on admission)   Assessment & Plan    Severe gastroparesis  Discussed with pharmacy, increase Reglan and schedule it  Add Zyprexa and erythromycin     Hematemesis   Assessment & Plan    Suspect related to recurrent emesis  Hemoglobin is stable, no need for transfusion          VTE prophylaxis: SCDs, holding chemoprophylaxis for DVT as she had hematemesis

## 2019-07-09 NOTE — PROGRESS NOTES
Dr. Benz paged regarding patient's vomiting despite receiving IV Zofran. MD returned page at [phone]. Orders received, see MAR.

## 2019-07-09 NOTE — PROGRESS NOTES
Paged MD to inform of stable blood sugar x4 hrs, AG=11 and CO2=19. Awaiting call back to receive orders.

## 2019-07-10 NOTE — CARE PLAN
Problem: Bowel/Gastric:  Goal: Normal bowel function is maintained or improved  Outcome: PROGRESSING AS EXPECTED  No BM this admission, pt continues to have persistent N/V refractory to antiemetic treatment.   Intervention: Educate patient and significant other/support system about diet, fluid intake, medications and activity to promote bowel function  Done  Intervention: Educate patient and significant other/support system about signs and symptoms of constipation and interventions to implement  Done  Intervention: Collaborate with Interdisciplinary Team for optimal positioning for bowel evacuation  Done      Problem: Pain Management  Goal: Pain level will decrease to patient's comfort goal  Outcome: PROGRESSING AS EXPECTED  Pt continues to have persistent abdominal pain with minimal improvement after pain medication administration     Intervention: Follow pain managment plan developed in collaboration with patient and Interdisciplinary Team  Done  Intervention: Educate and implement non-pharmacologic comfort measures. Examples: relaxation, distration, play therapy, activity therapy, massage, etc.  Done

## 2019-07-10 NOTE — PROGRESS NOTES
Critical Care Progress Note    Date of admission  7/8/2019    Chief Complaint  28 y.o. female admitted 7/8/2019 with DKA    Hospital Course    28 y.o. female with known history of poorly controlled IDDM with last A1c 3/2019 of 12.6, gastroparesis, and migraines.  Who presented 7/8/2019 with 1 day N/V/left flank pain.  Patient currently very poor historian and taking a lot of effort to get her to respond to questions however,  she indicates that she was in her normal state of health yesterday and 3 last night woke up this morning with nausea and vomiting as well as pain in her left side.  She denies any fever chills or sweats no sick contacts, indicates that she takes her insulin as instructed and checks her blood sugar regularly.  States that blood sugar was in normal range last night however this morning was registering very high and therefore presented to ER.      Interval Problem Update  Reviewed last 24 hour events:  T-max 98.9  +1.3 L over last 24 hours, +5.5 L since admit  CT renal colic 7/8 reviewed  K3.8  AG 11  CO2 19    Insulin infusion@2  D10 1/2NS@100    93% on RA  Last BM PTA      Review of Systems  Review of Systems   Constitutional: Negative for chills, fever and malaise/fatigue.   HENT: Negative for congestion.    Eyes: Negative for blurred vision and double vision.   Respiratory: Negative for cough, sputum production and shortness of breath.    Cardiovascular: Negative for chest pain and palpitations.   Gastrointestinal: Positive for nausea and vomiting. Negative for abdominal pain.   Neurological: Negative for focal weakness.   Psychiatric/Behavioral: Negative for depression.   All other systems reviewed and are negative.       Vital Signs for last 24 hours   Temp:  [36.8 °C (98.2 °F)-37.2 °C (98.9 °F)] 37 °C (98.6 °F)  Pulse:  [] 104  Resp:  [9-47] 20  SpO2:  [93 %-98 %] 93 %    Hemodynamic parameters for last 24 hours       Respiratory Information for the last 24 hours       Physical Exam    Physical Exam   Constitutional: She appears well-developed.   HENT:   Head: Normocephalic and atraumatic.   Eyes: Pupils are equal, round, and reactive to light. Conjunctivae are normal.   Neck: No tracheal deviation present.   Cardiovascular: Normal rate and intact distal pulses.    Pulmonary/Chest: She has no wheezes. She has no rales.   Abdominal: Soft. She exhibits no distension. There is no tenderness.   Musculoskeletal: She exhibits no edema.   Neurological: No cranial nerve deficit.   Skin: Skin is warm and dry.   Psychiatric: She has a normal mood and affect.   Nursing note and vitals reviewed.      Medications  Current Facility-Administered Medications   Medication Dose Route Frequency Provider Last Rate Last Dose   • DEXTROSE 10% BOLUS 125-250 mL  125-250 mL Intravenous PRN Hugo Benz M.D.       • prochlorperazine (COMPAZINE) injection 10 mg  10 mg Intravenous Q6HRS PRN Hugo Joiner M.D.   10 mg at 07/09/19 1614   • metoclopramide (REGLAN) injection 10 mg  10 mg Intravenous Q6HRS George Benítez M.D.   10 mg at 07/10/19 0535   • OLANZapine (ZYPREXA) tablet 5 mg  5 mg Oral Q EVENING George Benítez M.D.   Stopped at 07/09/19 1800   • erythromycin lactobionate (ERYTHROCIN) 250 mg in NS 50 mL IVPB  250 mg Intravenous Q8HRS Hugo Joiner M.D.   Stopped at 07/10/19 0641   • fentaNYL (SUBLIMAZE) injection 25 mcg  25 mcg Intravenous Q4HRS PRN Hugo Joiner M.D.   25 mcg at 07/10/19 0553   • lidocaine (LIDODERM) 5 % 1 Patch  1 Patch Transdermal Q24HR Hugo Joiner M.D.   1 Patch at 07/09/19 1317   • 1/2 NS infusion   Intravenous Continuous Hugo Joiner M.D.   Stopped at 07/09/19 1615   • Adult DKA potassium(K+) replacement scale  1 Each Intravenous Q4HRS Hugo Joiner M.D.   1 Each at 07/10/19 0200   • D5 1/2 NS infusion   Intravenous Continuous Hugo Joiner M.D.   Stopped at 07/09/19 1914   • dextrose 10% and 0.45% NaCl infusion   Intravenous Continuous Hugo Joiner M.D. 100 mL/hr  at 07/10/19 0505     • insulin regular human (HUMULIN/NOVOLIN R) 62.5 Units in  mL Infusion for DKA  3 Units/hr Intravenous Continuous Hugo Joiner M.D. 8 mL/hr at 07/10/19 0604 2 Units/hr at 07/10/19 0604   • magnesium sulfate IVPB premix 2 g  2 g Intravenous Once PRN Hugo Joiner M.D.   Stopped at 07/09/19 1601    Or   • magnesium sulfate IVPB premix 4 g  4 g Intravenous Once PRN Hugo Joiner M.D.       • MD ALERT-PHARMACY TO CONSULT FOR DKA MONITORING 1 Each  1 Each Other PRN Hugo Joiner M.D.       • vitamin D (cholecalciferol) tablet 2,000 Units  2,000 Units Oral DAILY Reese Cazares M.D.   2,000 Units at 07/10/19 0535   • SUMAtriptan (IMITREX) tablet 50 mg  50 mg Oral Once PRN Reese Cazares M.D.       • senna-docusate (PERICOLACE or SENOKOT S) 8.6-50 MG per tablet 2 Tab  2 Tab Oral BID Reese Cazares M.D.   2 Tab at 07/10/19 0534    And   • polyethylene glycol/lytes (MIRALAX) PACKET 1 Packet  1 Packet Oral QDAY PRN Reese Cazares M.D.        And   • magnesium hydroxide (MILK OF MAGNESIA) suspension 30 mL  30 mL Oral QDAY PRN Reese Cazares M.D.        And   • bisacodyl (DULCOLAX) suppository 10 mg  10 mg Rectal QDAY PRN Reese Cazares M.D.       • acetaminophen (TYLENOL) tablet 650 mg  650 mg Oral Q6HRS PRN Reese Cazares M.D.       • ondansetron (ZOFRAN) syringe/vial injection 4 mg  4 mg Intravenous Q4HRS PRN Reese Cazares M.D.   4 mg at 07/09/19 1727   • famotidine (PEPCID) injection 20 mg  20 mg Intravenous BID Reese Cazares M.D.   20 mg at 07/10/19 0535   • oxyCODONE immediate-release (ROXICODONE) tablet 5 mg  5 mg Oral Q4HRS PRN Hugo Joiner M.D.   5 mg at 07/08/19 2304       Fluids    Intake/Output Summary (Last 24 hours) at 07/10/19 0713  Last data filed at 07/10/19 0600   Gross per 24 hour   Intake          4161.47 ml   Output             2775 ml   Net          1386.47 ml       Laboratory  Recent Labs      07/08/19   1326   ISTATAPH  6.965*    ISTATAPCO2  10.9*   ISTATAPO2  114*   ISTATATCO2  <5*   [id number]  95   ISTATARTHCO3  2.5*   ISTATARTBE  -28*   ISTATTEMP  97.7 F   ISTATFIO2  21   ISTATSPEC  Arterial   ISTATAPHTC  6.971*   [id number]  111*         Recent Labs      07/09/19   0120   07/09/19   1157  07/09/19   1611   07/10/19   0000  07/10/19   0400  07/10/19   0630   SODIUM  150*   < >  148*  149*   < >  147*  146*  149*   POTASSIUM  3.7   < >  3.9  3.9   < >  3.4*  3.5*  3.8   CHLORIDE  122*   < >  118*  119*   < >  119*  118*  119*   CO2  20   < >  13*  15*   < >  21  20  19*   BUN  8   < >  7*  6*   < >  5*  5*  4*   CREATININE  0.63   < >  0.59  0.74   < >  0.59  0.57  0.53   MAGNESIUM  2.2   --   2.1  2.0   --    --    --    --    PHOSPHORUS  1.1*   --   1.6*  1.6*   --   2.1*   --    --    CALCIUM  8.3*   < >  8.8  8.8   < >  8.3*  8.3*  8.4*    < > = values in this interval not displayed.     Recent Labs      07/08/19 0938 07/08/19 1315   07/10/19   0000  07/10/19   0400  07/10/19   0630   ALTSGPT  25  22   --    --    --    --    ASTSGOT  42  35   --    --    --    --    ALKPHOSPHAT  108*  121*   --    --    --    --    TBILIRUBIN  0.3  0.2   --    --    --    --    LIPASE  13   --    --    --    --    --    GLUCOSE  569*  534*   < >  116*  135*  165*    < > = values in this interval not displayed.     Recent Labs      07/08/19 0938 07/08/19   1315  07/09/19   0405   WBC  7.9  16.6*  8.9   NEUTSPOLYS  63.70  80.40*   --    LYMPHOCYTES  26.60  10.30*   --    MONOCYTES  8.00  7.70   --    EOSINOPHILS  0.40  0.10   --    BASOPHILS  0.50  0.40   --    ASTSGOT  42  35   --    ALTSGPT  25  22   --    ALKPHOSPHAT  108*  121*   --    TBILIRUBIN  0.3  0.2   --      Recent Labs      07/08/19   0938  07/08/19   1315  07/09/19   0405   RBC  4.73  4.66  4.30   HEMOGLOBIN  12.5  12.8  11.2*   HEMATOCRIT  41.5  43.4  36.8*   PLATELETCT  240  283  233       Imaging  X-Ray:  No film today    Assessment/Plan  * Diabetic ketoacidosis without  coma associated with type 1 diabetes mellitus (HCC)- (present on admission)   Assessment & Plan    history of poorly controlled diabetes  Last A1c 3/2019 12.6  Was placed back on DKA protocol after transition yesterday secondary to poor intake  Continue with insulin infusion and every hour fingersticks  Continue every 4 hours BMP  Continue IV fluids and keep n.p.o.  Aggressive electrolyte replacement     Metabolic acidosis due to diabetes mellitus (HCC)- (present on admission)   Assessment & Plan    Improving with insulin infusion  Every 4 hours BMP     Gastroparesis- (present on admission)   Assessment & Plan    N.p.o. for now  Symptom management with PRN antiemetics  IV fluid for hydration  Scheduled Reglan, Zyprexa, and erythromycin          VTE:  Contraindicated  Ulcer: H2 Antagonist  Lines: Central Line  Ongoing indication addressed and Pitt Catheter  Ongoing indication addressed    I have performed a physical exam and reviewed and updated ROS and Plan today (7/10/2019). In review of yesterday's note (7/9/2019), there are no changes except as documented above.     Discussed patient condition and risk of morbidity and/or mortality with Hospitalist, RN, RT, Pharmacy and Patient  The patient remains critically ill.  Critical care time = 37 minutes in directly providing and coordinating critical care and extensive data review.  No time overlap and excludes procedures.

## 2019-07-10 NOTE — PROGRESS NOTES
Diabetes education: Pt well known from previous admits. Please see consult note. Pt not yet seen as she remains on insulin gtt.  Plan: CDE will meet with pt before discharge to assess needs if any, or prescriptions needed. Please call [phone] when discharge plans known.  Pt needs a new Hg A1 c ordered as the last was done 3/20/19.

## 2019-07-10 NOTE — CONSULTS
Diabetes education: Pt has a hx of type one diabetes and is known from previous visits. Pt admitted in DKA with blood sugar of 569 and last Hg a1c was 12.6% and was from 3/20/19. Pt remains on an insulin gtt. Pt remains with some nausea /vomiting.  Per EPIC, pt uses Humalog and Basaglar. CDE to clarify insulins and as well as supplies at home.  Plan: CDE will meet with pt before discharge to assess needs if any. Please call [phone] when discharge plans known.      Last visit was admit of 12/18/18:    Marline Cameron R.N.      Diabetes education: Met with Alis, who was sleeping but woke when name called without problem. Pt is known from previous admits.  Some question regarding pt having fast acting insulin at home. Pt's insurance changed from Apidra to Admelog and pt did not have a new prescription.   Plan: Pt has had education related to diabetes multiple times. Pt was open to discussing needs at this visit.  Pt will prescriptions for Admelog solostar pens ( box of five), and strips and lancets for True metrix to test four times a day. All prescriptions need to have dx code (E10.65), directions, quantity and refills for Medicaid to cover. Pt states she has Basaglar, and pen needles at home. Please call [phone] if needs change.           Previous visit:      Progress Notes  Date of Service: 6/28/2018 7:05 PM  Marline Cameron R.N.         Diabetes education: met with patient and mom to review diabetes management. Pt states she has all her supplies and insulin at home. Reviewed insulin pen, storage, shelf life and site rotation. Pt states she is feeling better and is eating now.  Pt hopes to go home tomorrow. Pt has type one diabetes and now that she is eating should be on both a meal bolus as well as a correction for her blood sugars. Pt uses a correction and a 1: 6 carb ratio at home. Finger sticks should be ac and hs (not every six hours) and if possible limit fast acting insulin at hs. Please call [phone] if needs  "change.           Consults  Date of Service: 6/21/2018 8:27 PM  Marline Cameron R.N.         Diabetes education: Pt known from previous admits. Pt has not yet been seen as pt continues to have nausea and GI recommendation is to have \"dark quiet room without frequent disturbance of patient\".  Plan: Alana CHAVIRA CDE to meet with patient tomorrow if appropriate. Please call [phone] if needs change.           Consults  Date of Service: 11/3/2015 1:37 PM  Marline Cameron R.N.      Diabetes education: Alis has hx of type one diabetes, and is known from previous admits:                          Consults by Marline Cameron R.N. at 9/8/2015 3:59 PM        Author: Marline Cameron R.N. Service: (none) Author Type: Diabetes Health Educator       Filed: 9/8/2015 4:03 PM Note Time: 9/8/2015 3:59 PM Status: Signed       : Marline Cameron R.N. (Diabetes Health Educator)                     Diabetes education: Pt is well known from previous admits:                     Consults by Mariah Fernández R.N. at 11/25/2014 11:22 AM      Author: Mariah Fernández R.N.  Service: (none)  Author Type: Registered Nurse      Filed: 11/25/2014 11:28 AM  Note Time: 11/25/2014 11:22 AM  Status: Signed      : Mariah Fernández R.N. (Registered Nurse)              Diabetes Education: Reviewed sick day management with Alis. She has a new meter at home and has seen the urine ketone strips before. We reviewed checking her urine ketones when she is sick or if her blood sugar is over 300. She has an insulin correction of 1:50>150 and verbalized understanding of correcting for ketones. She was encouraged to make up a sick day box and have every thing she needs in it including her sick day instructions. She states she has a prescription for Zofran if she gets nauseated. She was provided written instructions to refer to. She states she gets insurance January 1, 2015 and will get a doctor. She states she understands how hard it is on her to keep going " "into DKA and wants to start taking better care of herself.                                Consults by Marline Cameron R.N. at 11/24/2014 5:18 PM      Author: Marline Cameron R.N.  Service: (none)  Author Type: Diabetes Health Educator      Filed: 11/24/2014 5:23 PM  Note Time: 11/24/2014 5:18 PM  Status: Signed      : Marline Cameron R.N. (Diabetes Health Educator)             Diabetes education: Alis well known to this CDE from previous admits. Issues were supplies but with last admit, she reports she has insulin and strips. Handouts were given to her regarding [employer]ST, and [place] [employer]. Please refer to consult and progress notes for 11/9/14 admission (specifically 11/13/14).  She states this time her blood sugars were 293 prior to admit but increased to over 700 when she \"got here\".  She states they normally are not that high. Asked about testing for ketones and adding more insulin and she states she was never taught that.  Pt was just receiving medication for nausea. Will defer education until tomorrow or Wednesday.      Plan: discuss ketones, insulin and DKA prior to discharge when patient more appropriate. Please call [phone] if needs change.             Plan: Pt has just been moved to  and is currently sleeping. Questions regarding insulin coverage from  note. Mom not available. Pt was on Novolog and Lantus, but her insurance covers Apidra. There was some question about prior auth. Not sure if she cannot tolerate Apidra or did not know to have it changed. CDE will follow up with her tomorrow. Please call [phone] if needs change.                                                Author: Marline Cameron R.N. Service: (none) Author Type: Diabetes Health Educator       Filed: 9/9/2015 1:33 PM Note Time: 9/9/2015 1:17 PM Status: Signed     : Marline Cameron R.N. (Diabetes Health Educator)            Diabetes education: Met with Alis regarding insulin needs. Pt currently " "has [employer] which only covers Lantus and Apidra insulin. Pt states she was told by the [facility] pharmacy, they would cover Levemir and Novolog only for a short period of time and then would need a prior auth. Pt has used Lantus in the hospital without problems (currently on 15 units every 12 hours). She has never used Apidra (which has more of a \"tale\", and may work better for her gastroparesis).  Pt is struggling with eating. She took her insulin, ate breakfast well, and then threw up. Pt has not received any Humalog coverage since yesterday at 1658. She normally follows a 1:6 carbohydrate ratio with a correction of 1 unit for every 50 mg/dl above 150 (scale starting at 200) for meals only. She states she was taking 25 units of Levermir in am and 22 units in pm. Taking much less here and still with lower blood sugars : : 64 ( 76,and 85 after treating the low) and 91. 9/8: 110, 184, 221 and 169. Pt may benefit with decreasing pm Lantus and when able to tolerate food changing sliding scale coverage to meals only, starting at 200 and adding carbohydrate ratio of 1:6 again meals only.   Plan: As above as well as at discharge patient will need a prescription for Apidra and Lantus insulins to get coverage from her current insurance (pt will be changing medicaid in October). Please call [phone] if needs change.                 Pt is now on Medicaid HMO not [employer]. Not sure what are her concerns this admit, as she is sleeping. Pt is getting 25 units of Lantus HS and Humalog sliding scale every six hours. Pt would benefit from having coverage ac and hs when she begins to eat . At this point she has not required any Humalo, 88 and 131.   Plan: CDE will follow up tomorrow. Please call [phone] if needs change.               Progress Notes  Date of Service: 2015 3:12 PM  Marline Cameron R.N.      Diabetes education: Met with Alis, who states she is feeling much better. Asked what happened, she states she " was fine at work, came home, began to eat, and after first few bites became sick. States now with insurance she has her insulin (pens) and supplies and takes her insulin, checks her blood sugar, checks for ketones when sick, rotates sites, primes pen, holds at sight for 10 seconds, and does not use pen longer than 28 days.  Reviewed sick day, she knows to come in if not able to keep fluid down ( was able at first than later not ).  Pt will be setting up an appointment with an endocrinologist (had to clear up coverage with insurance).  Questions answered .  Please call [phone] if needs change.

## 2019-07-10 NOTE — PROGRESS NOTES
Hospital Medicine Daily Progress Note    Date of Service  7/10/2019    Chief Complaint  28 y.o. female admitted 7/8/2019 with vomiting.    Hospital Course    Ms Sparks has a history of type I diabetes, she is frequently admitted for DKA.  Patient was brought the the emergency room on 7/8/2019 with nausea, vomiting and high blood sugar.  She was admitted to the ICU for DKA protocol.  Attempts to wean insulin drip have resulted in hyperglycemia and recurrence of acidosis, she has a slow to resolve diabetic ketoacidosis.        Interval Problem Update  Remains on DKA protocol, Insulin drip at 2 units/hr, on D10 1/2NS  Continuous nausea and multiple episodes of emesis, minimal relief with zofran, reglan and erthyromycin  UOP 2.2 L over last 24 hours  Diffuse abdominal pain, slightly better than yesterday    Consultants/Specialty  Critical Care, I discussed the patient's condition with Dr. Joiner this morning on ICU Rounds    Code Status  Full Code    Disposition  ICU    Review of Systems  Review of Systems   Constitutional: Positive for malaise/fatigue. Negative for chills.   Respiratory: Negative for cough, hemoptysis and sputum production.    Cardiovascular: Negative for chest pain, palpitations and orthopnea.   Gastrointestinal: Positive for abdominal pain, nausea and vomiting.   Musculoskeletal: Positive for back pain. Negative for myalgias and neck pain.   Skin: Negative for itching and rash.   Neurological: Negative for dizziness.   All other systems reviewed and are negative.       Physical Exam  Temp:  [36.8 °C (98.2 °F)-37.2 °C (98.9 °F)] 37 °C (98.6 °F)  Pulse:  [] 104  Resp:  [9-47] 20  SpO2:  [93 %-98 %] 93 %    Physical Exam   Constitutional: She is oriented to person, place, and time. She appears well-developed and well-nourished.   HENT:   Head: Normocephalic and atraumatic.   Eyes: Conjunctivae and EOM are normal. Right eye exhibits no discharge. Left eye exhibits no discharge.   Cardiovascular:  Normal rate, regular rhythm and intact distal pulses.    No murmur heard.  2+ Radial Pulses  Brisk Capillary Refill   Pulmonary/Chest: Effort normal and breath sounds normal. No respiratory distress. She has no wheezes.   Abdominal: Soft. Bowel sounds are normal. She exhibits no distension. There is no tenderness. There is no rebound.   Musculoskeletal: Normal range of motion. She exhibits no edema.   Neurological: She is oriented to person, place, and time. No cranial nerve deficit.   Sleepy   Skin: Skin is warm and dry. She is not diaphoretic. No erythema.   Skin is warm and well perfused   Psychiatric: She has a normal mood and affect.       Fluids    Intake/Output Summary (Last 24 hours) at 07/10/19 0821  Last data filed at 07/10/19 0600   Gross per 24 hour   Intake          3610.61 ml   Output             2340 ml   Net          1270.61 ml       Laboratory  Recent Labs      07/08/19   0938  07/08/19   1315  07/09/19   0405   WBC  7.9  16.6*  8.9   RBC  4.73  4.66  4.30   HEMOGLOBIN  12.5  12.8  11.2*   HEMATOCRIT  41.5  43.4  36.8*   MCV  87.7  93.1  85.6   MCH  26.4*  27.5  26.0*   MCHC  30.1*  29.5*  30.4*   RDW  51.3*  54.6*  51.9*   PLATELETCT  240  283  233   MPV  10.5  10.2  10.1     Recent Labs      07/10/19   0000  07/10/19   0400  07/10/19   0630   SODIUM  147*  146*  149*   POTASSIUM  3.4*  3.5*  3.8   CHLORIDE  119*  118*  119*   CO2  21  20  19*   GLUCOSE  116*  135*  165*   BUN  5*  5*  4*   CREATININE  0.59  0.57  0.53   CALCIUM  8.3*  8.3*  8.4*                   Imaging  CT-RENAL COLIC EVALUATION(A/P W/O)   Final Result         1.  Negative noncontrast CT scan of the abdomen and pelvis.      2.  No hydronephrosis or nephrolithiasis.      3.  Normal appearance of the appendix.      4.  Increased volume of stool in the distal colon. No evidence of bowel obstruction.      DX-CHEST-PORTABLE (1 VIEW)   Final Result      Right internal jugular catheter placement with the tip projecting over the right  atrium. No pneumothorax is identified.           Assessment/Plan  * Diabetic ketoacidosis without coma associated with type 1 diabetes mellitus (HCC)- (present on admission)   Assessment & Plan    Recurrent, severe diabetic ketoacidosis  Life-threatening, continue ICU care  She is slow to resolve, this is typical for her hospital stay  Continue insulin drip, titrate rate serial BMPs and Accu-Chek's  Replace electrolytes     Gastroparesis- (present on admission)   Assessment & Plan    Previous gastric emptying study was actually normal has had EGD in the past with dilation as well  The overall picture seems consistent with gastroparesis with an acute exacerbation  Hydration, blood sugar control, antiemetics  I changed Zyprexa to orally dissolving tab as she is having so much trouble taking PO meds       Hematemesis- (present on admission)   Assessment & Plan    Suspect related to recurrent emesis  Hemoglobin is stable, no need for transfusion          VTE prophylaxis: SCDs, holding chemoprophylaxis for DVT as she had hematemesis

## 2019-07-10 NOTE — PROGRESS NOTES
MD notified that FSBG has been stable for over 4hrs, Anion Gap=9 and CO2=20 and ready to be transitioned off DKA insulin gtt to SQ insulin per DKA protocol. D/t persistent N/V refractory to antiemetics, MD recommended remaining on the DKA insulin gtt until the morning and reassess at that time for transition to SQ insulin.

## 2019-07-11 PROBLEM — R11.2 NAUSEA & VOMITING: Status: ACTIVE | Noted: 2019-01-01

## 2019-07-11 NOTE — PROGRESS NOTES
Critical Care Progress Note    Date of admission  7/8/2019    Chief Complaint  28 y.o. female admitted 7/8/2019 with DKA    Hospital Course    28 y.o. female with known history of poorly controlled IDDM with last A1c 3/2019 of 12.6, gastroparesis, and migraines.  Who presented 7/8/2019 with 1 day N/V/left flank pain.  Patient currently very poor historian and taking a lot of effort to get her to respond to questions however,  she indicates that she was in her normal state of health yesterday and 3 last night woke up this morning with nausea and vomiting as well as pain in her left side.  She denies any fever chills or sweats no sick contacts, indicates that she takes her insulin as instructed and checks her blood sugar regularly.  States that blood sugar was in normal range last night however this morning was registering very high and therefore presented to ER.      Interval Problem Update  Reviewed last 24 hour events:  T-max 98.8  +1.4 L over last 24 hours, +7 L since admit  No cxr this am  K 4.2  CO2 21    Insulin infusion@ 1  D10W@100    97% on RA  Last BM PTA      Review of Systems  Review of Systems   Constitutional: Negative for chills, fever and malaise/fatigue.   HENT: Negative for congestion.    Eyes: Negative for blurred vision and double vision.   Respiratory: Negative for cough, sputum production and shortness of breath.    Cardiovascular: Negative for chest pain and palpitations.   Gastrointestinal: Positive for nausea and vomiting. Negative for abdominal pain.   Neurological: Negative for focal weakness.   Psychiatric/Behavioral: Negative for depression.   All other systems reviewed and are negative.       Vital Signs for last 24 hours   Temp:  [36.3 °C (97.4 °F)-37.1 °C (98.8 °F)] 36.3 °C (97.4 °F)  Pulse:  [] 88  Resp:  [13-54] 23  SpO2:  [91 %-98 %] 97 %    Hemodynamic parameters for last 24 hours       Respiratory Information for the last 24 hours       Physical Exam   Physical Exam    Constitutional: She appears well-developed.   HENT:   Head: Normocephalic and atraumatic.   Eyes: Pupils are equal, round, and reactive to light. Conjunctivae are normal.   Neck: No tracheal deviation present.   Cardiovascular: Normal rate and intact distal pulses.    Pulmonary/Chest: She has no wheezes. She has no rales.   Abdominal: Soft. She exhibits no distension. There is no tenderness.   Musculoskeletal: She exhibits no edema.   Neurological: No cranial nerve deficit.   Skin: Skin is warm and dry.   Psychiatric: She has a normal mood and affect.   Nursing note and vitals reviewed.      Medications  Current Facility-Administered Medications   Medication Dose Route Frequency Provider Last Rate Last Dose   • bisacodyl (DULCOLAX) suppository 10 mg  10 mg Rectal Once Hugo Joiner M.D.       • dextrose 10% infusion   Intravenous Continuous Hugo Joiner M.D. 100 mL/hr at 07/10/19 2232     • OLANZapine zydis (ZYPREXA TBDP) disintegrating tablet 5 mg  5 mg Oral Q EVENING George Benítez M.D.   5 mg at 07/10/19 1708   • DEXTROSE 10% BOLUS 125-250 mL  125-250 mL Intravenous PRN Hugo Benz M.D.       • prochlorperazine (COMPAZINE) injection 10 mg  10 mg Intravenous Q6HRS PRN Hugo Joiner M.D.   10 mg at 07/09/19 1614   • metoclopramide (REGLAN) injection 10 mg  10 mg Intravenous Q6HRS George Benítez M.D.   10 mg at 07/11/19 0455   • erythromycin lactobionate (ERYTHROCIN) 250 mg in NS 50 mL IVPB  250 mg Intravenous Q8HRS Hugo Joiner M.D. 50 mL/hr at 07/11/19 0458 250 mg at 07/11/19 0458   • fentaNYL (SUBLIMAZE) injection 25 mcg  25 mcg Intravenous Q4HRS PRN Hugo Joiner M.D.   25 mcg at 07/10/19 0553   • lidocaine (LIDODERM) 5 % 1 Patch  1 Patch Transdermal Q24HR Hugo Joiner M.D.   1 Patch at 07/10/19 1200   • 1/2 NS infusion   Intravenous Continuous Hugo Joiner M.D.   Stopped at 07/09/19 8201   • Adult DKA potassium(K+) replacement scale  1 Each Intravenous Q4HRS Hugo Joiner M.D.    1 Each at 07/11/19 0200   • D5 1/2 NS infusion   Intravenous Continuous Hugo Joiner M.D.   Stopped at 07/09/19 1914   • insulin regular human (HUMULIN/NOVOLIN R) 62.5 Units in  mL Infusion for DKA  3 Units/hr Intravenous Continuous Hugo Joiner M.D. 4 mL/hr at 07/11/19 0403 1 Units/hr at 07/11/19 0403   • magnesium sulfate IVPB premix 2 g  2 g Intravenous Once PRN Hugo Joiner M.D.   Stopped at 07/09/19 1601    Or   • magnesium sulfate IVPB premix 4 g  4 g Intravenous Once PRN Hugo Joiner M.D.       • MD ALERT-PHARMACY TO CONSULT FOR DKA MONITORING 1 Each  1 Each Other PRN Hugo Joiner M.D.       • vitamin D (cholecalciferol) tablet 2,000 Units  2,000 Units Oral DAILY Reese Cazares M.D.   2,000 Units at 07/11/19 0456   • SUMAtriptan (IMITREX) tablet 50 mg  50 mg Oral Once PRN Reese Cazares M.D.       • senna-docusate (PERICOLACE or SENOKOT S) 8.6-50 MG per tablet 2 Tab  2 Tab Oral BID Reese Cazares M.D.   2 Tab at 07/10/19 1708    And   • polyethylene glycol/lytes (MIRALAX) PACKET 1 Packet  1 Packet Oral QDAY PRN Reese Cazares M.D.        And   • magnesium hydroxide (MILK OF MAGNESIA) suspension 30 mL  30 mL Oral QDAY PRN Reese Cazares M.D.        And   • bisacodyl (DULCOLAX) suppository 10 mg  10 mg Rectal QDAY PRN Reese Cazares M.D.       • acetaminophen (TYLENOL) tablet 650 mg  650 mg Oral Q6HRS PRN Reese Cazares M.D.       • ondansetron (ZOFRAN) syringe/vial injection 4 mg  4 mg Intravenous Q4HRS PRN Reese Cazares M.D.   4 mg at 07/10/19 0812   • famotidine (PEPCID) injection 20 mg  20 mg Intravenous BID Reese Cazares M.D.   20 mg at 07/11/19 0456   • oxyCODONE immediate-release (ROXICODONE) tablet 5 mg  5 mg Oral Q4HRS PRN Hugo Joiner M.D.   5 mg at 07/08/19 2304       Fluids    Intake/Output Summary (Last 24 hours) at 07/11/19 0722  Last data filed at 07/11/19 0600   Gross per 24 hour   Intake          3198.77 ml   Output             1710 ml    Net          1488.77 ml       Laboratory  Recent Labs      07/08/19   1321  07/08/19   1326   ISTATAPH  7.006*  6.965*   ISTATAPCO2  <10.0*  10.9*   ISTATAPO2  109*  114*   ISTATATCO2  see below  <5*   [id number]  see below  95   ISTATARTHCO3  see below  2.5*   ISTATARTBE  see below  -28*   ISTATTEMP  97.9 F  97.7 F   ISTATFIO2  21  21   ISTATSPEC  Arterial  Arterial   ISTATAPHTC  7.011*  6.971*   [id number]  107*  111*         Recent Labs      07/09/19   1157  07/09/19   1611   07/10/19   0000   07/10/19   1038   07/10/19   1950  07/11/19   0030  07/11/19   0635   SODIUM  148*  149*   < >  147*   < >  149*   < >  142  142  139   POTASSIUM  3.9  3.9   < >  3.4*   < >  4.0   < >  3.7  3.6  4.2   CHLORIDE  118*  119*   < >  119*   < >  119*   < >  113*  113*  110   CO2  13*  15*   < >  21   < >  19*   < >  21  23  21   BUN  7*  6*   < >  5*   < >  4*   < >  3*  3*  3*   CREATININE  0.59  0.74   < >  0.59   < >  0.52   < >  0.44*  0.43*  0.49*   MAGNESIUM  2.1  2.0   --    --    --   1.8   --    --    --    --    PHOSPHORUS  1.6*  1.6*   --   2.1*   --    --    --    --    --    --    CALCIUM  8.8  8.8   < >  8.3*   < >  8.4*   < >  8.4*  8.3*  8.3*    < > = values in this interval not displayed.     Recent Labs      07/08/19   0938  07/08/19   1315   07/10/19   1950  07/11/19   0030  07/11/19   0635   ALTSGPT  25  22   --    --    --    --    ASTSGOT  42  35   --    --    --    --    ALKPHOSPHAT  108*  121*   --    --    --    --    TBILIRUBIN  0.3  0.2   --    --    --    --    LIPASE  13   --    --    --    --    --    GLUCOSE  569*  534*   < >  128*  102*  255*    < > = values in this interval not displayed.     Recent Labs      07/08/19   0938  07/08/19   1315  07/09/19   0405  07/10/19   1038  07/11/19   0630   WBC  7.9  16.6*  8.9  6.1  4.5*   NEUTSPOLYS  63.70  80.40*   --   62.60  47.90   LYMPHOCYTES  26.60  10.30*   --   25.50  40.20   MONOCYTES  8.00  7.70   --   9.30  6.70   EOSINOPHILS  0.40  0.10   --    1.80  4.40   BASOPHILS  0.50  0.40   --   0.50  0.40   ASTSGOT  42  35   --    --    --    ALTSGPT  25  22   --    --    --    ALKPHOSPHAT  108*  121*   --    --    --    TBILIRUBIN  0.3  0.2   --    --    --      Recent Labs      07/09/19   0405  07/10/19   1038  07/11/19   0630   RBC  4.30  4.16*  4.23   HEMOGLOBIN  11.2*  10.9*  11.1*   HEMATOCRIT  36.8*  35.9*  35.9*   PLATELETCT  233  197  160*       Imaging  X-Ray:  No film today    Assessment/Plan  * Diabetic ketoacidosis without coma associated with type 1 diabetes mellitus (HCC)- (present on admission)   Assessment & Plan    history of poorly controlled diabetes  Last A1c 3/2019 12.6  Continue with insulin infusion and every hour fingersticks  Continue every 4 hours BMP  Continue IV fluids and keep n.p.o.  Aggressive electrolyte replacement     Metabolic acidosis due to diabetes mellitus (HCC)- (present on admission)   Assessment & Plan    Improving with insulin infusion  Every 4 hours BMP     Nausea & vomiting   Assessment & Plan    Previous gastric emptying study normal 12/2018  EGD 6/2018 with pyloric dilation  N.p.o. for now  Symptom management with PRN antiemetics  IV fluid for hydration  Continue Reglan, Zyprexa, and erythromycin          VTE:  Contraindicated  Ulcer: H2 Antagonist  Lines: Central Line  Ongoing indication addressed and Pitt Catheter  Ongoing indication addressed    I have performed a physical exam and reviewed and updated ROS and Plan today (7/11/2019). In review of yesterday's note (7/10/2019), there are no changes except as documented above.     Discussed patient condition and risk of morbidity and/or mortality with Hospitalist, RN, RT, Pharmacy and Patient  The patient remains critically ill.  Critical care time = 32 minutes in directly providing and coordinating critical care and extensive data review.  No time overlap and excludes procedures.

## 2019-07-11 NOTE — PROGRESS NOTES
Notified MD of FSBG=89. Received orders to lower insulin gtt to 1u/hr. Will continue to monitor FSBG q1hr

## 2019-07-11 NOTE — PROGRESS NOTES
Diabetes education: Met with Alis this afternoon. Pt uses a carb ratio of 1:6 and a correction with a unique sliding scale: 150 = 1 unit, 200= 2 units, 250 = 4 units, 300 = 8 units and 350= 16 units. Pt uses an One touch ultra mini meter ( and needs a prescription for strips. She states she has her insulin pens, and pen needles. Reviewed insulin pens, insulin storage, shelf life and site rotation.  Discussed sick day, DKA,  and need to test ketones and cover with insulin. Discussed need to be seen earlier in the illness rather than later.  Pt is getting Lantus 20 units BID and Regular insulin sliding scale coverage ac and hs with current blood sugar of 232 (4 units).  Plan: pt needs to have Humalog/Regular with her meals (meal bolus or carb counting) as well as the sliding scale coverage ( as she has type one diabetes). Please use same fast acting insulin for both sliding scale/correction and meal bolus.  Pt will need a prescription for One touch ultra test strips and delica lancets to test four times a day. She will also need prescription for urine ketone test strips to check ketones when sick or blood sugars above 300 two x in a row. All prescriptions need to have directions, quantity, refills and dx code ( E10.65) for Medicaid to cover. Please call [phone] if needs change;.

## 2019-07-11 NOTE — PROGRESS NOTES
Hospital Medicine Daily Progress Note    Date of Service  7/11/2019    Chief Complaint  28 y.o. female admitted 7/8/2019 with vomiting.    Hospital Course    Ms Sparks has a history of type I diabetes, she is frequently admitted for DKA.  Patient was brought the the emergency room on 7/8/2019 with nausea, vomiting and high blood sugar.  She was admitted to the ICU for DKA protocol.  Attempts to wean insulin drip have resulted in hyperglycemia and recurrence of acidosis, she has a slow to resolve diabetic ketoacidosis.        Interval Problem Update  NPO as remains on DKA protocol, blood sugars dropped as low as 89, on 1 unit insulin/hr  No nausea or emesis overnight, this is a vast improvement in the last 24-hours  No abdominal pain, She wants to try eating    Consultants/Specialty  Critical Care, I discussed the patient's condition with Dr. Joiner this morning on ICU Rounds    Code Status  Full Code    Disposition  ICU    Review of Systems  Review of Systems   Constitutional: Positive for malaise/fatigue. Negative for chills.   Respiratory: Negative for cough, hemoptysis and sputum production.    Cardiovascular: Negative for chest pain, palpitations and orthopnea.   Gastrointestinal: Negative for abdominal pain, nausea and vomiting.   Musculoskeletal: Positive for back pain. Negative for myalgias and neck pain.   Skin: Negative for itching and rash.   Neurological: Negative for dizziness.   All other systems reviewed and are negative.       Physical Exam  Temp:  [36.3 °C (97.4 °F)-37.1 °C (98.8 °F)] 36.3 °C (97.4 °F)  Pulse:  [] 88  Resp:  [13-54] 23  SpO2:  [91 %-98 %] 97 %    Physical Exam   Constitutional: She is oriented to person, place, and time. She appears well-developed and well-nourished.   HENT:   Head: Normocephalic and atraumatic.   Eyes: Conjunctivae and EOM are normal. Right eye exhibits no discharge. Left eye exhibits no discharge.   Cardiovascular: Normal rate, regular rhythm and intact  distal pulses.    No murmur heard.  2+ Radial Pulses  Brisk Capillary Refill   Pulmonary/Chest: Effort normal and breath sounds normal. No respiratory distress. She has no wheezes.   Abdominal: Soft. Bowel sounds are normal. She exhibits no distension. There is no tenderness. There is no rebound.   Musculoskeletal: Normal range of motion. She exhibits no edema.   Neurological: She is alert and oriented to person, place, and time. No cranial nerve deficit.   Skin: Skin is warm and dry. She is not diaphoretic. No erythema.   Skin is warm and well perfused   Psychiatric: She has a normal mood and affect.       Fluids    Intake/Output Summary (Last 24 hours) at 07/11/19 0817  Last data filed at 07/11/19 0600   Gross per 24 hour   Intake          2978.77 ml   Output             1710 ml   Net          1268.77 ml       Laboratory  Recent Labs      07/09/19   0405  07/10/19   1038  07/11/19   0630   WBC  8.9  6.1  4.5*   RBC  4.30  4.16*  4.23   HEMOGLOBIN  11.2*  10.9*  11.1*   HEMATOCRIT  36.8*  35.9*  35.9*   MCV  85.6  86.3  84.9   MCH  26.0*  26.2*  26.2*   MCHC  30.4*  30.4*  30.9*   RDW  51.9*  53.8*  51.5*   PLATELETCT  233  197  160*   MPV  10.1  9.7  9.7     Recent Labs      07/10/19   1950  07/11/19   0030  07/11/19   0635   SODIUM  142  142  139   POTASSIUM  3.7  3.6  4.2   CHLORIDE  113*  113*  110   CO2  21  23  21   GLUCOSE  128*  102*  255*   BUN  3*  3*  3*   CREATININE  0.44*  0.43*  0.49*   CALCIUM  8.4*  8.3*  8.3*                   Imaging  CT-RENAL COLIC EVALUATION(A/P W/O)   Final Result         1.  Negative noncontrast CT scan of the abdomen and pelvis.      2.  No hydronephrosis or nephrolithiasis.      3.  Normal appearance of the appendix.      4.  Increased volume of stool in the distal colon. No evidence of bowel obstruction.      DX-CHEST-PORTABLE (1 VIEW)   Final Result      Right internal jugular catheter placement with the tip projecting over the right atrium. No pneumothorax is identified.            Assessment/Plan  * Diabetic ketoacidosis without coma associated with type 1 diabetes mellitus (HCC)- (present on admission)   Assessment & Plan    Recurrent, severe diabetic ketoacidosis  Life-threatening, continue ICU care  She is slow to resolve, this is typical for her hospital stay  Her symptoms of nausea vomiting have improved today, her anion gap is closed  Transition to subcutaneous insulin     Gastroparesis- (present on admission)   Assessment & Plan    Previous gastric emptying study was actually normal has had EGD in the past with dilation as well  Despite a normal gastric emptying study, I think the overall picture seems consistent with gastroparesis with an acute exacerbation  Hydration, blood sugar control, antiemetics  Orally dissolving Zyprexa  Some improvement in symptoms today, cautiously advance her diet       Hematemesis- (present on admission)   Assessment & Plan    Suspect related to recurrent emesis  Hemoglobin is stable, no need for transfusion          VTE prophylaxis: SCDs, holding chemoprophylaxis for DVT as she had hematemesis

## 2019-07-11 NOTE — ASSESSMENT & PLAN NOTE
Previous gastric emptying study normal 12/2018  EGD 6/2018 with pyloric dilation  N.p.o. for now  Symptom management with PRN antiemetics  IV fluid for hydration  Continue Reglan, Zyprexa, and erythromycin

## 2019-07-12 PROBLEM — R11.2 NAUSEA & VOMITING: Status: RESOLVED | Noted: 2019-01-01 | Resolved: 2019-01-01

## 2019-07-12 PROBLEM — E10.10 DIABETIC KETOACIDOSIS WITHOUT COMA ASSOCIATED WITH TYPE 1 DIABETES MELLITUS (HCC): Status: RESOLVED | Noted: 2019-03-20 | Resolved: 2019-01-01

## 2019-07-12 PROBLEM — E11.10 METABOLIC ACIDOSIS DUE TO DIABETES MELLITUS (HCC): Status: RESOLVED | Noted: 2017-09-22 | Resolved: 2019-01-01

## 2019-07-12 PROBLEM — K92.0 HEMATEMESIS: Status: RESOLVED | Noted: 2019-01-01 | Resolved: 2019-01-01

## 2019-07-12 NOTE — CARE PLAN
Problem: Knowledge Deficit  Goal: Knowledge of disease process/condition, treatment plan, diagnostic tests, and medications will improve  Outcome: PROGRESSING AS EXPECTED  Education provided on disease process, interventions, medications.     Problem: Respiratory:  Goal: Respiratory status will improve  Outcome: PROGRESSING AS EXPECTED  Breathing even and unlabored on room air.     Problem: Skin Integrity  Goal: Risk for impaired skin integrity will decrease  Outcome: PROGRESSING AS EXPECTED  Patient turns self. No signs of pressure injuries.

## 2019-07-12 NOTE — DISCHARGE PLANNING
LSW met with pt at bedside for assessment. Pt lives with her parents. Pt's mom is her dc support. Prior to hospitalization pt was independent with ADLs.IADLs. Pt does not drive but has her mom to take her to her apts and work. Pt utilizes [facility] for her pharmacy. No DME in the home. Pt states she has great family/friend support.     LSW asked about [facility] and pt stated she doesn't need or want HH services at this time.     Plan: assist as needed     Care Transition Team Assessment    Information Source  Orientation : Oriented x 4  Information Given By: Parent  Informant's Name: Flaquito  Who is responsible for making decisions for patient? : Patient    Readmission Evaluation  Is this a readmission?: Yes - unplanned readmission  Why do you think you were readmitted?:  (diabetes)    Elopement Risk  Legal Hold: No  Ambulatory or Self Mobile in Wheelchair: No-Not an Elopement Risk  Disoriented: No  Psychiatric Symptoms: None  History of Wandering: No  Elopement this Admit: No  Vocalizing Wanting to Leave: No  Displays Behaviors, Body Language Wanting to Leave: No-Not at Risk for Elopement  Elopement Risk: Not at Risk for Elopement    Interdisciplinary Discharge Planning  Does Admitting Nurse Feel This Could be a Complex Discharge?: No  Primary Care Physician:  (hpoes clinic)  Lives with - Patient's Self Care Capacity: Parents  Patient or legal guardian wants to designate a caregiver (see row info): No  Support Systems: Parent  Mobility Issues: No  Patient Expects to be Discharged to:: home  Assistance Needed: No  Durable Medical Equipment: Not Applicable    Discharge Preparedness  What is your plan after discharge?: Uncertain - pending medical team collaboration, Home with help, Home health care  What are your discharge supports?: Parent, Partner  Prior Functional Level: Independent with Activities of Daily Living, 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)    Functional Assesment  Prior  Functional Level: Independent with Activities of Daily Living, 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)    Finances  Financial Barriers to Discharge: No (Low income)  Prescription Coverage: Yes    Vision / Hearing Impairment  Vision Impairment : No  Hearing Impairment : No    Values / Beliefs / Concerns  Values / Beliefs Concerns : No    Advance Directive  Advance Directive?: None    Domestic Abuse  Have you ever been the victim of abuse or violence?: No  Possible Abuse Reported to:: Not Applicable    Psychological Assessment  History of Substance Abuse: None  History of Psychiatric Problems: No  Non-compliant with Treatment: No  Newly Diagnosed Illness: No    Discharge Risks or Barriers  Discharge risks or barriers?: No PCP, Complex medical needs (Low income)    Anticipated Discharge Information  Anticipated discharge disposition: Home, [facility]

## 2019-07-12 NOTE — PROGRESS NOTES
Diabetes education: Blood sugars yesterday: 232 ( 4 units), 400 ( 14 units), 219 ( 4 units) and this am 121 with no coverage. Pt has type one diabetes and would need insulin for the food she ate for breakfast even if she did not need her blood sugars corrected.  Plan: pt needs to have Humalog/Regular with her meals (meal bolus or carb counting) as well as the sliding scale coverage ( as she has type one diabetes). Please use same fast acting insulin for both sliding scale/correction and meal bolus.  Pt will need a prescription for One touch ultra test strips and delica lancets to test four times a day. She will also need prescription for urine ketone test strips to check ketones when sick or blood sugars above 300 two x in a row. All prescriptions need to have directions, quantity, refills and dx code ( E10.65) for Medicaid to cover. Please call [phone] if needs change;.

## 2019-07-12 NOTE — CARE PLAN
Problem: Communication  Goal: The ability to communicate needs accurately and effectively will improve  Outcome: PROGRESSING AS EXPECTED      Problem: Infection  Goal: Will remain free from infection  Outcome: PROGRESSING AS EXPECTED      Problem: Respiratory:  Goal: Respiratory status will improve  Outcome: PROGRESSING AS EXPECTED

## 2019-07-12 NOTE — PROGRESS NOTES
Critical Care Progress Note    Date of admission  7/8/2019    Chief Complaint  28 y.o. female admitted 7/8/2019 with DKA    Hospital Course    28 y.o. female with known history of poorly controlled IDDM with last A1c 3/2019 of 12.6, gastroparesis, and migraines.  Who presented 7/8/2019 with 1 day N/V/left flank pain.  Patient currently very poor historian and taking a lot of effort to get her to respond to questions however,  she indicates that she was in her normal state of health yesterday and 3 last night woke up this morning with nausea and vomiting as well as pain in her left side.  She denies any fever chills or sweats no sick contacts, indicates that she takes her insulin as instructed and checks her blood sugar regularly.  States that blood sugar was in normal range last night however this morning was registering very high and therefore presented to ER.      Interval Problem Update  Reviewed last 24 hour events:              - acute events overnight              - Tm: Afebrile              - HR: 80-90s              - SBP: 110-120s              - Neuro:              - GI:               - UOP: Adequate              - Pitt: Yes              - Lines: Right IJ CVC              - PPx:              - CXR (personally reviewed):               - Antibiotic Day        T-max 98.8  +1.4 L over last 24 hours, +7 L since admit  No cxr this am  K 4.2  CO2 21    Insulin infusion@ 1  D10W@100    97% on RA  Last BM PTA      Review of Systems  Review of Systems   Constitutional: Negative for chills, fever and malaise/fatigue.   HENT: Negative for congestion.    Eyes: Negative for blurred vision and double vision.   Respiratory: Negative for cough, sputum production and shortness of breath.    Cardiovascular: Negative for chest pain and palpitations.   Gastrointestinal: Positive for nausea and vomiting. Negative for abdominal pain.   Neurological: Negative for focal weakness.   Psychiatric/Behavioral: Negative for depression.    All other systems reviewed and are negative.       Vital Signs for last 24 hours   Temp:  [36.6 °C (97.9 °F)-37.2 °C (99 °F)] 36.9 °C (98.4 °F)  Pulse:  [] 89  Resp:  [0-63] 30  SpO2:  [93 %-99 %] 95 %    Hemodynamic parameters for last 24 hours       Respiratory Information for the last 24 hours       Physical Exam   Physical Exam   Constitutional: She appears well-developed.   HENT:   Head: Normocephalic and atraumatic.   Eyes: Pupils are equal, round, and reactive to light. Conjunctivae are normal.   Neck: No tracheal deviation present.   Cardiovascular: Normal rate and intact distal pulses.    Pulmonary/Chest: She has no wheezes. She has no rales.   Abdominal: Soft. She exhibits no distension. There is no tenderness.   Musculoskeletal: She exhibits no edema.   Neurological: No cranial nerve deficit.   Skin: Skin is warm and dry.   Psychiatric: She has a normal mood and affect.   Nursing note and vitals reviewed.      Medications  Current Facility-Administered Medications   Medication Dose Route Frequency Provider Last Rate Last Dose   • famotidine (PEPCID) tablet 20 mg  20 mg Oral BID Hugo Joiner M.D.       • insulin regular (HUMULIN R) injection 3-14 Units  3-14 Units Subcutaneous 4X/DAY CHRISTOPHER Joiner M.D.   4 Units at 07/11/19 2038    And   • glucose 4 g chewable tablet 16 g  16 g Oral Q15 MIN PRN Hugo Joiner M.D.   Stopped at 07/12/19 0432    And   • DEXTROSE 10% BOLUS 250 mL  250 mL Intravenous Q15 MIN PRN Hugo Joiner M.D.       • insulin glargine (LANTUS) injection 30 Units  30 Units Subcutaneous BID Hugo Joiner M.D.   30 Units at 07/12/19 0602   • dextrose 10% infusion   Intravenous Continuous Hugo Joiner M.D.   Stopped at 07/11/19 1130   • OLANZapine zydis (ZYPREXA TBDP) disintegrating tablet 5 mg  5 mg Oral Q EVENING George Benítez M.D.   5 mg at 07/11/19 1840   • DEXTROSE 10% BOLUS 125-250 mL  125-250 mL Intravenous PRN Hugo Benz M.D.       •  prochlorperazine (COMPAZINE) injection 10 mg  10 mg Intravenous Q6HRS PRN Hugo Joiner M.D.   10 mg at 07/09/19 1614   • metoclopramide (REGLAN) injection 10 mg  10 mg Intravenous Q6HRS George Benítez M.D.   10 mg at 07/12/19 0524   • erythromycin lactobionate (ERYTHROCIN) 250 mg in NS 50 mL IVPB  250 mg Intravenous Q8HRS Hugo Joiner M.D.   Stopped at 07/12/19 0631   • fentaNYL (SUBLIMAZE) injection 25 mcg  25 mcg Intravenous Q4HRS PRN Hugo Joiner M.D.   25 mcg at 07/10/19 0553   • lidocaine (LIDODERM) 5 % 1 Patch  1 Patch Transdermal Q24HR Hugo Joiner M.D.   1 Patch at 07/11/19 1120   • vitamin D (cholecalciferol) tablet 2,000 Units  2,000 Units Oral DAILY Reese Cazares M.D.   2,000 Units at 07/12/19 0523   • SUMAtriptan (IMITREX) tablet 50 mg  50 mg Oral Once PRN Reese Cazares M.D.       • senna-docusate (PERICOLACE or SENOKOT S) 8.6-50 MG per tablet 2 Tab  2 Tab Oral BID Reese Cazares M.D.   2 Tab at 07/12/19 0523    And   • polyethylene glycol/lytes (MIRALAX) PACKET 1 Packet  1 Packet Oral QDAY PRN Reese Cazares M.D.        And   • magnesium hydroxide (MILK OF MAGNESIA) suspension 30 mL  30 mL Oral QDAY PRN Reese Cazares M.D.        And   • bisacodyl (DULCOLAX) suppository 10 mg  10 mg Rectal QDAY PRN Reese Cazares M.D.       • acetaminophen (TYLENOL) tablet 650 mg  650 mg Oral Q6HRS PRN Reese Cazares M.D.       • ondansetron (ZOFRAN) syringe/vial injection 4 mg  4 mg Intravenous Q4HRS PRN Reese Cazares M.D.   4 mg at 07/10/19 0812   • oxyCODONE immediate-release (ROXICODONE) tablet 5 mg  5 mg Oral Q4HRS PRN Hugo Joiner M.D.   5 mg at 07/08/19 2304       Fluids    Intake/Output Summary (Last 24 hours) at 07/12/19 0728  Last data filed at 07/12/19 0600   Gross per 24 hour   Intake          1419.43 ml   Output             2935 ml   Net         -1515.57 ml       Laboratory          Recent Labs      07/09/19   1157  07/09/19   1611   07/10/19   0000   07/10/19    1038   07/11/19   1845  07/12/19   0010  07/12/19   0603   SODIUM  148*  149*   < >  147*   < >  149*   < >  139  138  141   POTASSIUM  3.9  3.9   < >  3.4*   < >  4.0   < >  4.7  3.7  3.9   CHLORIDE  118*  119*   < >  119*   < >  119*   < >  106  110  109   CO2  13*  15*   < >  21   < >  19*   < >  20  24  23   BUN  7*  6*   < >  5*   < >  4*   < >  10  12  12   CREATININE  0.59  0.74   < >  0.59   < >  0.52   < >  0.73  0.54  0.54   MAGNESIUM  2.1  2.0   --    --    --   1.8   --    --    --   1.7   PHOSPHORUS  1.6*  1.6*   --   2.1*   --    --    --    --    --    --    CALCIUM  8.8  8.8   < >  8.3*   < >  8.4*   < >  8.2*  8.8  8.5    < > = values in this interval not displayed.     Recent Labs      07/11/19   1845  07/12/19   0010  07/12/19   0603   GLUCOSE  348*  134*  192*     Recent Labs      07/10/19   1038  07/11/19   0630  07/12/19   0415   WBC  6.1  4.5*  4.3*   NEUTSPOLYS  62.60  47.90  40.90*   LYMPHOCYTES  25.50  40.20  43.90*   MONOCYTES  9.30  6.70  8.50   EOSINOPHILS  1.80  4.40  6.30   BASOPHILS  0.50  0.40  0.20     Recent Labs      07/10/19   1038  07/11/19 0630  07/12/19   0415   RBC  4.16*  4.23  4.16*   HEMOGLOBIN  10.9*  11.1*  11.4*   HEMATOCRIT  35.9*  35.9*  34.9*   PLATELETCT  197  160*  149*       Imaging  X-Ray:  No film today    Assessment/Plan  * Diabetic ketoacidosis without coma associated with type 1 diabetes mellitus (HCC)- (present on admission)   Assessment & Plan    history of poorly controlled diabetes  Last A1c 3/2019 12.6  Continue with insulin infusion and every hour fingersticks  Continue every 4 hours BMP  Continue IV fluids and keep n.p.o.  Aggressive electrolyte replacement     Metabolic acidosis due to diabetes mellitus (HCC)- (present on admission)   Assessment & Plan    Improving with insulin infusion  Every 4 hours BMP     Nausea & vomiting   Assessment & Plan    Previous gastric emptying study normal 12/2018  EGD 6/2018 with pyloric dilation  N.p.o. for  now  Symptom management with PRN antiemetics  IV fluid for hydration  Continue Reglan, Zyprexa, and erythromycin          VTE:  Contraindicated  Ulcer: H2 Antagonist  Lines: Central Line  Ongoing indication addressed and Pitt Catheter  Ongoing indication addressed    I have performed a physical exam and reviewed and updated ROS and Plan today (7/12/2019). In review of yesterday's note (7/11/2019), there are no changes except as documented above.     Discussed patient condition and risk of morbidity and/or mortality with Hospitalist, RN, RT, Pharmacy and Patient  The patient remains critically ill.  Critical care time = 32 minutes in directly providing and coordinating critical care and extensive data review.  No time overlap and excludes procedures.

## 2019-07-12 NOTE — PROGRESS NOTES
Certified RN attempted placement of PIV via US. Unable to place. Dr. Lopez notified.. Ok to transfer to floor with central line and remove at time of discharge.

## 2019-07-13 NOTE — PROGRESS NOTES
Discharge education provided. Patient escorted with mother off unit and to parking garage. Belongings with patient. Discharge summary and diabetes resources with patient.

## 2019-07-13 NOTE — DISCHARGE INSTRUCTIONS
Discharge Instructions    Discharged to home by car with mother. Discharged via wheelchair, hospital escort: Yes.  Special equipment needed: Not Applicable    Be sure to schedule a follow-up appointment with your primary care doctor or any specialists as instructed.     Discharge Plan:   Diet Plan: Discussed  Activity Level: Discussed  Confirmed Follow up Appointment: Patient to Call and Schedule Appointment  Confirmed Symptoms Management: Discussed  Medication Reconciliation Updated: No (Comments) (Home meds )  Influenza Vaccine Indication: Not indicated: Previously immunized this influenza season and > 8 years of age    I understand that a diet low in cholesterol, fat, and sodium is recommended for good health. Unless I have been given specific instructions below for another diet, I accept this instruction as my diet prescription.   Other diet: Diabetic    Special Instructions: Discharge Instructions per Jazmyne Flores M.D.  Condition:  Stable  Diet:  Diabetic  Activity:  As tolerated  Meds:  Continue home meds  [facility]  Return to ER if sugars >400x2 or if <60 x2    · Is patient discharged on Warfarin / Coumadin?   No         Diabetes Mellitus and Sick Day Management  Blood sugar (glucose) can be difficult to control when you are sick. Common illnesses that can cause problems for people with diabetes (diabetes mellitus) include colds, fever, flu (influenza), nausea, vomiting, and diarrhea. These illnesses can cause stress and loss of body fluids (dehydration), and those issues can cause blood glucose levels to increase. Because of this, it is very important to take your insulin and diabetes medicines and eat some form of carbohydrate when you are sick.  You should make a plan for days when you are sick (sick day plan) as part of your diabetes management plan. You and your health care provider should make this plan in advance. The following guidelines are intended to help you  manage an illness that lasts for about 24 hours or less. Your health care provider may also give you more specific instructions.  What do I need to do to manage my blood glucose?  · Check your blood glucose every 2-4 hours, or as often as told by your health care provider.  · Know your sick day treatment goals. Your target blood glucose levels may be different when you are sick.  · If you use insulin, take your usual dose.  ¨ If your blood glucose continues to be too high, you may need to take an additional insulin dose as told by your health care provider.  · If you use oral diabetes medicine, you may need to stop taking it if you are not able to eat or drink normally. Ask your health care provider about whether you need to stop taking these medicines while you are sick.  · If you use injectable hormone medicines other than insulin to control your diabetes, ask your health care provider about whether you need to stop taking these medicines while you are sick.  What else can I do to manage my diabetes when I am sick?  Check your ketones  · If you have type 1 diabetes, check your urine ketones every 4 hours.  · If you have type 2 diabetes, check your urine ketones as often as told by your health care provider.  Drink fluids  · Drink enough fluid to keep your urine clear or pale yellow. This is especially important if you have a fever, vomiting, or diarrhea. Those symptoms can lead to dehydration.  · Follow any instructions from your health care provider about beverages to avoid.  ¨ Do not drink alcohol, caffeine, or drinks that contain a lot of sugar.  Take medicines as directed  · Take-over-the-counter and prescription medicines only as told by your health care provider.  · Check medicine labels for added sugars. Some medicines may contain sugar or types of sugars that can raise your blood glucose level.  What foods can I eat when I am sick?  You need to eat some form of carbohydrates when you are sick. You should  eat 45-50 grams (45-50 g) of carbohydrates every 3-4 hours until you feel better.  All of the food choices below contain about 15 g of carbohydrates. Plan ahead and keep some of these foods around so you have them if you get sick.  · 4-6 oz (120-177 mL) carbonated beverage that contains sugar, such as regular (not diet) soda. You may be able to drink carbonated beverages more easily if you open the beverage and let it sit at room temperature for a few minutes before drinking.  · ½ of a twin frozen ice pop.  · 4 oz (120 g) regular gelatin.  · 4 oz (120 mL) fruit juice.  · 4 oz (120 g) ice cream or frozen yogurt.  · 2 oz (60 g) sherbet.  · 8 oz (240 mL) clear broth or soup.  · 4 oz (120 g) regular custard.  · 4 oz (120 g) regular pudding.  · 8 oz (240 g) plain yogurt.  · 1 slice bread or toast.  · 6 saltine crackers.  · 5 vanilla wafers.  Questions to ask your health care provider  Consider asking the following questions so you know what to do on days when you are sick:  · Should I adjust my diabetes medicines?  · How often do I need to check my blood glucose?  · What supplies do I need to manage my diabetes at home when I am sick?  · What number can I call if I have questions?  · What foods and drinks should I avoid?  Contact a health care provider if:  · You develop symptoms of diabetic ketoacidosis, such as:  ¨ Fatigue.  ¨ Weight loss.  ¨ Excessive thirst.  ¨ Light-headedness.  ¨ Fruity or sweet-smelling breath.  ¨ Excessive urination.  ¨ Vision changes.  ¨ Confusion or irritability.  ¨ Nausea.  ¨ Vomiting.  ¨ Rapid breathing.  ¨ Pain in the abdomen.  ¨ Feeling flushed.  · You are unable to drink fluids without vomiting.  · You have any of the following for more than 6 hours:  ¨ Nausea.  ¨ Vomiting.  ¨ Diarrhea.  · Your blood glucose is at or above 240 mg/dL (13.3 mmol/L), even after you take an additional insulin dose.  · You have a change in how you think, feel, or act (mental status).  · You develop another  serious illness.  · You have been sick or have had a fever for 2 days or longer and you are not getting better.  Get help right away if:  · Your blood glucose is lower than 54 mg/dL (3.0 mmol/L).  · You have difficulty breathing.  · You have moderate or high ketone levels in your urine.  · You used emergency glucagon to treat low blood glucose.  Summary  · Blood sugar (glucose) can be difficult to control when you are sick. Common illnesses that can cause problems for people with diabetes (diabetes mellitus) include colds, fever, flu (influenza), nausea, vomiting, and diarrhea.  · Illnesses can cause stress and loss of body fluids (dehydration), and those issues can cause blood glucose levels to increase.  · Make a plan for days when you are sick (sick day plan) as part of your diabetes management plan. You and your health care provider should make this plan in advance.  · It is very important to take your insulin and diabetes medicines and to eat some form of carbohydrate when you are sick.  · Contact your health care provider if have problems managing your blood glucose levels when you are sick, or if you have been sick or had a fever for 2 days or longer and are not getting better.  This information is not intended to replace advice given to you by your health care provider. Make sure you discuss any questions you have with your health care provider.  Document Released: 12/20/2004 Document Revised: 09/15/2017 Document Reviewed: 09/15/2017  [employer] Interactive Patient Education © 2017 [employer]      Diabetic Ketoacidosis  Diabetic ketoacidosis is a life-threatening complication of diabetes. If it is not treated, it can cause severe dehydration and organ damage and can lead to a coma or death.  What are the causes?  This condition develops when there is not enough of the hormone insulin in the body. Insulin helps the body to break down sugar for energy. Without insulin, the body cannot break down sugar, so it  breaks down fats instead. This leads to the production of acids that are called ketones. Ketones are poisonous at high levels.  This condition can be triggered by:  · Stress on the body that is brought on by an illness.  · Medicines that raise blood glucose levels.  · Not taking diabetes medicine.  What are the signs or symptoms?  Symptoms of this condition include:  · Fatigue.  · Weight loss.  · Excessive thirst.  · Light-headedness.  · Fruity or sweet-smelling breath.  · Excessive urination.  · Vision changes.  · Confusion or irritability.  · Nausea.  · Vomiting.  · Rapid breathing.  · Abdominal pain.  · Feeling flushed.  How is this diagnosed?  This condition is diagnosed based on a medical history, a physical exam, and blood tests. You may also have a urine test that checks for ketones.  How is this treated?  This condition may be treated with:  · Fluid replacement. This may be done to correct dehydration.  · Insulin injections. These may be given through the skin or through an IV tube.  · Electrolyte replacement. Electrolytes, such as potassium and sodium, may be given in pill form or through an IV tube.  · Antibiotic medicines. These may be prescribed if your condition was caused by an infection.  Follow these instructions at home:  Eating and drinking  · Drink enough fluids to keep your urine clear or pale yellow.  · If you cannot eat, alternate between drinking fluids with sugar (such as juice) and salty fluids (such as broth or bouillon).  · If you can eat, follow your usual diet and drink sugar-free liquids, such as water.  Other Instructions   · Take insulin as directed by your health care provider. Do not skip insulin injections. Do not use  insulin.  · If your blood sugar is over 240 mg/dL, monitor your urine ketones every 4-6 hours.  · If you were prescribed an antibiotic medicine, finish all of it even if you start to feel better.  · Rest and exercise only as directed by your health care  provider.  · If you get sick, call your health care provider and begin treatment quickly. Your body often needs extra insulin to fight an illness.  · Check your blood glucose levels regularly. If your blood glucose is high, drink plenty of fluids. This helps to flush out ketones.  Contact a health care provider if:  · Your blood glucose level is too high or too low.  · You have ketones in your urine.  · You have a fever.  · You cannot eat.  · You cannot tolerate fluids.  · You have been vomiting for more than 2 hours.  · You continue to have symptoms of this condition.  · You develop new symptoms.  Get help right away if:  · Your blood glucose levels continue to be high (elevated).  · Your monitor reads “high” even when you are taking insulin.  · You faint.  · You have chest pain.  · You have trouble breathing.  · You have a sudden, severe headache.  · You have sudden weakness in one arm or one leg.  · You have sudden trouble speaking or swallowing.  · You have vomiting or diarrhea that gets worse after 3 hours.  · You feel severely fatigued.  · You have trouble thinking.  · You have abdominal pain.  · You are severely dehydrated. Symptoms of severe dehydration include:  ¨ Extreme thirst.  ¨ Dry mouth.  ¨ Blue lips.  ¨ Cold hands and feet.  ¨ Rapid breathing.  This information is not intended to replace advice given to you by your health care provider. Make sure you discuss any questions you have with your health care provider.  Document Released: 12/15/2001 Document Revised: 05/25/2017 Document Reviewed: 11/25/2015  [employer] Interactive Patient Education © 2017 [employer]      Depression / Suicide Risk    As you are discharged from this [facility] facility, it is important to learn how to keep safe from harming yourself.    Recognize the warning signs:  · Abrupt changes in personality, positive or negative- including increase in energy   · Giving away possessions  · Change in eating patterns- significant  weight changes-  positive or negative  · Change in sleeping patterns- unable to sleep or sleeping all the time   · Unwillingness or inability to communicate  · Depression  · Unusual sadness, discouragement and loneliness  · Talk of wanting to die  · Neglect of personal appearance   · Rebelliousness- reckless behavior  · Withdrawal from people/activities they love  · Confusion- inability to concentrate     If you or a loved one observes any of these behaviors or has concerns about self-harm, here's what you can do:  · Talk about it- your feelings and reasons for harming yourself  · Remove any means that you might use to hurt yourself (examples: pills, rope, extension cords, firearm)  · Get professional help from the community (Mental Health, Substance Abuse, psychological counseling)  · Do not be alone:Call your Safe Contact- someone whom you trust who will be there for you.  · Call your local CRISIS HOTLINE 179-6071 or 495-285-3582  · Call your local Children's Mobile Crisis Response Team [place] (096) 582-4791 or www.GROUNDFLOOR  · Call the toll free National Suicide Prevention Hotlines   · National Suicide Prevention Lifeline 416-618-ZQFB (1629)  · National Hope Line Network 800-SUICIDE (032-1605)          Discharge Instructions per Jazmyne Flores M.D.  Condition:  Stable  Diet:  Diabetic  Activity:  As tolerated  Meds:  Continue home meds  [facility]  Return to ER if sugars >400x2 or if <60 x2

## 2019-07-13 NOTE — DISCHARGE SUMMARY
Discharge Summary    CHIEF COMPLAINT ON ADMISSION  Chief Complaint   Patient presents with   • Vomiting     N/V and Blood sugar in 480s. pt took 25u insulin at home and glucose still elevated. pt got 4 zofran and 1L in ambulance   • High Blood Sugar       Reason for Admission  EMS     Admission Date  7/8/2019    CODE STATUS  Full Code    HPI & HOSPITAL COURSE  This is a 28 y.o. female here with a history of type I diabetes; she is frequently admitted for DKA.  She was brought the the emergency room on 7/8/2019 with nausea, vomiting and high blood sugar.  She was admitted to the ICU for DKA protocol.  Attempts to wean insulin drip resulted in hyperglycemia and recurrence of acidosis, she had a slow to resolve diabetic ketoacidosis. The second time she was weaned off was successful, and she was able to keep food down as well.  She was anxious to get home the evening of the 12th, and was walking around the unit without issue     Therefore, she is discharged in good and stable condition to home with close outpatient follow-up.    The patient met 2-midnight criteria for an inpatient stay at the time of discharge.    Discharge Date  07/12/19        FOLLOW UP ITEMS POST DISCHARGE  Diet:  Diabetic  Activity:  As tolerated  Meds:  Continue home meds  [facility]  Return to ER if sugars >400x2 or if <60 x2    DISCHARGE DIAGNOSES  Principal Problem (Resolved):    Diabetic ketoacidosis without coma associated with type 1 diabetes mellitus (HCC) POA: Yes  Active Problems:    Gastroparesis POA: Yes  Resolved Problems:    Metabolic acidosis due to diabetes mellitus (HCC) POA: Yes    Hematemesis POA: Yes    Nausea & vomiting POA: Yes      MEDICATIONS ON DISCHARGE     Medication List      CONTINUE taking these medications      Instructions   BASAGLAR KWIKPEN 100 UNIT/ML Sopn injection  Generic drug:  insulin glargine   Inject 30 Units as instructed 2 Times a Day.  Dose:  30 Units     Cholecalciferol  2000 UNIT Caps   Take 2,000 Units by mouth every day.  Dose:  2000 Units     insulin lispro 100 UNIT/ML Soln  Commonly known as:  HUMALOG   Inject 10-18 Units as instructed 3 times a day before meals. Sliding Scale  Dose:  10-18 Units     SUMAtriptan 50 MG Tabs  Commonly known as:  IMITREX   Take 50 mg by mouth Once PRN for Migraine.  Dose:  50 mg            Allergies  Allergies   Allergen Reactions   • Pcn [Penicillins] Shortness of Breath and Swelling     Per patient's Mom, patient tolerates Keflex   • Lisinopril Unspecified     Per historical: Reported pedal swelling. No facial/angioedema or rash nor respiratory symptoms.    • Promethazine Vomiting       DIET  Orders Placed This Encounter   Procedures   • Diet Order Diabetic     Standing Status:   Standing     Number of Occurrences:   1     Order Specific Question:   Diet:     Answer:   Diabetic [3]     Order Specific Question:   Calorie modifications:     Answer:   1800 kcals [4]       ACTIVITY  As tolerated.  Weight bearing as tolerated    CONSULTATIONS  Intensivist    PROCEDURES  Central line placement 7/8    LABORATORY  Lab Results   Component Value Date    SODIUM 141 07/12/2019    POTASSIUM 3.9 07/12/2019    CHLORIDE 109 07/12/2019    CO2 23 07/12/2019    GLUCOSE 192 (H) 07/12/2019    BUN 12 07/12/2019    CREATININE 0.54 07/12/2019        Lab Results   Component Value Date    WBC 4.3 (L) 07/12/2019    HEMOGLOBIN 11.4 (L) 07/12/2019    HEMATOCRIT 34.9 (L) 07/12/2019    PLATELETCT 149 (L) 07/12/2019        Total time of the discharge process exceeds 37 minutes.

## 2019-07-17 NOTE — ASSESSMENT & PLAN NOTE
Has been between dka and insulin 180 protocol a couple times now.   Anion gap closed, however patient not tolerating oral diet  Maintain intravascular euvolemia  Monitor and replete electrolytes as clinically indicated    Really need endocrine assistance since poorly controlled spends most of the month in dka on gtt in hospital ? If a insulin pump candidate -> really feel this trajectory for this patient is the wrong course and need more assistance to maximize patient care and outcome    D5LR w/ kcl  Will ask IR to place port for frequent hospitalization and management of patient -> now pushed back to Monday    Will again try to transition today and monitor closely for return to dka  Seems like main drivers are nausea/vomiting leading to ketosis glycolysis and increase adrenergic state.

## 2019-07-17 NOTE — ED PROVIDER NOTES
ED Provider  Scribed for Clifford Null D.O. by Willie Gagnon. 7/17/2019  10:34 AM    Means of arrival: Ambulance  History obtained from: patient  History limited by: none    CHIEF COMPLAINT  Chief Complaint   Patient presents with   • High Blood Sugar       HPI  Alis Sparks is a 28 y.o. female who presents with acute hyperglycemia that started this morning. The patient states that she woke up with hyperglycemia in the 400s. She took her insulin 30 at home but it did not help lower her blood sugar. The patient associates generalized pain, nausea, and vomiting (multiple episodes of dark color). She was recently hospitlized for DKA. The patient has a history of DKA, Diabetes, and Gastroparesis. She states that she is allergic to penicillin.    REVIEW OF SYSTEMS  See HPI for further details. All other systems are negative.     PAST MEDICAL HISTORY   has a past medical history of Backpain; Bronchitis; Diabetes type 1, controlled ([facility]); DKA (diabetic ketoacidoses) ([facility]); Fall; Gastroparesis; Kidney infection; Pneumonia; and UTI (urinary tract infection).    SOCIAL HISTORY  Social History     Social History Main Topics   • Smoking status: Never Smoker   • Smokeless tobacco: Never Used   • Alcohol use No   • Drug use: No   • Sexual activity: No       SURGICAL HISTORY   has a past surgical history that includes gastroscopy-endo (9/18/2014); gastroscopy with biopsy (9/18/2014); other; submandible abscess incision and drainage (Left, 11/8/2017); dental extraction(s) (11/8/2017); and gastroscopy-endo (N/A, 6/9/2018).    CURRENT MEDICATIONS  Home Medications     Reviewed by Blade Brice (Pharmacy Tech) on 07/17/19 at 1118  Med List Status: Complete   Medication Last Dose Status   Insulin Glargine (BASAGLAR KWIKPEN) 100 UNIT/ML Solution Pen-injector 7/17/2019 Active   insulin lispro (HUMALOG) 100 UNIT/ML Solution 7/16/2019 Active   SUMAtriptan (IMITREX) 50 MG Tab > 1 week Active           "      ALLERGIES  Allergies   Allergen Reactions   • Pcn [Penicillins] Shortness of Breath and Swelling     Per patient's Mom, patient tolerates Keflex   • Lisinopril Unspecified     Per historical: Reported pedal swelling. No facial/angioedema or rash nor respiratory symptoms.    • Promethazine Vomiting       PHYSICAL EXAM  VITAL SIGNS: Pulse (!) 125   Temp 36.8 °C (98.3 °F) (Temporal)   Ht 1.651 m (5' 5\")   Wt 81.6 kg (180 lb)   BMI 29.95 kg/m²   Constitutional: Alert in no apparent distress. Active vomiting dark liquid. Generalized pain  HENT: No signs of trauma, mucous membranes are moist  Eyes: Conjunctiva normal, Non-icteric.   Neck: Normal range of motion, No tenderness, Supple.  Lymphatic: No lymphadenopathy noted.   Cardiovascular: Regular rhythm, tachycardic rate, no murmurs.   Thorax & Lungs: tachypnea, No wheezing, No chest tenderness.   Abdomen: Bowel sounds normal, Soft, No tenderness, No masses, No pulsatile masses. No peritoneal signs.  Skin: Warm, Dry, palad  Back: No bony tenderness, No CVA tenderness.   Extremities: No edema, No tenderness, No cyanosis  Musculoskeletal: Good range of motion in all major joints. No tenderness to palpation or major deformities noted.   Neurologic: Alert and oriented x4, Normal motor function, Normal sensory function, No focal deficits noted.   Psychiatric: Affect normal, Judgment normal, Mood normal.     MEDICAL DECISION MAKING  This is a 28 y.o. female who presents with complaints of vomiting, rash and known high blood sugar.    On arrival she was vomiting, she initial blood sugar was greater than 400.  His evaluation did show metabolic acidosis with positive ketones diabetic ketoacidosis.  She received 2 L of fluid for the DKA, spoke with the hospitalist to initiate insulin treatment.  Her potassium is mildly decreased in the process of    DIAGNOSTIC STUDIES / PROCEDURES    EKG  12 Lead EKG interpreted by me shown below.      LABS  Results for orders placed or " performed during the hospital encounter of 07/17/19   CBC w/ Differential   Result Value Ref Range    WBC 8.2 4.8 - 10.8 K/uL    RBC 3.62 (L) 4.20 - 5.40 M/uL    Hemoglobin 9.9 (L) 12.0 - 16.0 g/dL    Hematocrit 31.8 (L) 37.0 - 47.0 %    MCV 87.8 81.4 - 97.8 fL    MCH 27.3 27.0 - 33.0 pg    MCHC 31.1 (L) 33.6 - 35.0 g/dL    RDW 50.2 (H) 35.9 - 50.0 fL    Platelet Count 225 164 - 446 K/uL    MPV 10.3 9.0 - 12.9 fL    Neutrophils-Polys 57.60 44.00 - 72.00 %    Lymphocytes 30.20 22.00 - 41.00 %    Monocytes 9.50 0.00 - 13.40 %    Eosinophils 1.00 0.00 - 6.90 %    Basophils 0.40 0.00 - 1.80 %    Immature Granulocytes 1.30 (H) 0.00 - 0.90 %    Nucleated RBC 0.00 /100 WBC    Neutrophils (Absolute) 4.70 2.00 - 7.15 K/uL    Lymphs (Absolute) 2.47 1.00 - 4.80 K/uL    Monos (Absolute) 0.78 0.00 - 0.85 K/uL    Eos (Absolute) 0.08 0.00 - 0.51 K/uL    Baso (Absolute) 0.03 0.00 - 0.12 K/uL    Immature Granulocytes (abs) 0.11 0.00 - 0.11 K/uL    NRBC (Absolute) 0.00 K/uL   Complete Metabolic Panel (CMP)   Result Value Ref Range    Sodium 142 135 - 145 mmol/L    Potassium 3.4 (L) 3.6 - 5.5 mmol/L    Chloride 118 (H) 96 - 112 mmol/L    Co2 5 (LL) 20 - 33 mmol/L    Anion Gap 19.0 (H) 0.0 - 11.9    Glucose 435 (H) 65 - 99 mg/dL    Bun 18 8 - 22 mg/dL    Creatinine 0.55 0.50 - 1.40 mg/dL    Calcium 5.8 (LL) 8.5 - 10.5 mg/dL    AST(SGOT) 15 12 - 45 U/L    ALT(SGPT) 19 2 - 50 U/L    Alkaline Phosphatase 83 30 - 99 U/L    Total Bilirubin 0.3 0.1 - 1.5 mg/dL    Albumin 2.4 (L) 3.2 - 4.9 g/dL    Total Protein 4.2 (L) 6.0 - 8.2 g/dL    Globulin 1.8 (L) 1.9 - 3.5 g/dL    A-G Ratio 1.3 g/dL   BETA-HYDROXYBUTYRIC ACID   Result Value Ref Range    beta-Hydroxybutyric Acid >8.00 (H) 0.02 - 0.27 mmol/L   ESTIMATED GFR   Result Value Ref Range    GFR If African American >60 >60 mL/min/1.73 m 2    GFR If Non African American >60 >60 mL/min/1.73 m 2   HCG QUAL SERUM   Result Value Ref Range    Beta-Hcg Qualitative Serum Negative Negative   ACCU-CHEK  GLUCOSE   Result Value Ref Range    Glucose - Accu-Ck 436 (HH) 65 - 99 mg/dL   EKG   Result Value Ref Range    Report       [facility] Emergency Dept.    Test Date:  2019  Pt Name:    AMANDA BRANCH            Department: ER  MRN:        [id number]                      Room:       RD 08  Gender:     Female                       Technician: [id number]  :        1990                   Requested By:ER TRIAGE PROTOCOL  Order #:    [id number]                    Reading MD:    Measurements  Intervals                                Axis  Rate:       124                          P:          71  MI:         128                          QRS:        40  QRSD:       66                           T:          35  QT:         328  QTc:        471    Interpretive Statements  SINUS TACHYCARDIA  Compared to ECG 2019 13:03:50  T-wave abnormality no longer present         All labs reviewed by me.       IV Placement Procedure Note: (with ultrasound guidance)   The patient was consented all risks benefits and alternatives were discussed.   LOCATION: left upper arm  POSITION: trendelenburg.   PROCEDURE NOTE: Indication, poor access.   Sterile prep, gown, and drape protocols were used. Using ultrasound guidance, IV was placed with ultrasound guidance successfully by cannulating the vein with ultrasound guidance. We used a 20-gauge IV and had good flash and was able to cannulate fully and normal saline flowed easily. There is no localized infiltration. The line was secured by nursing staff.         COURSE  Pertinent Labs & Imaging studies reviewed. (See chart for details)    Review of past medical records shows the patient was admitted to the hospital on 19 for DKA.    10:34 AM - Patient seen and examined at bedside. Discussed plan of care. The patient will be resuscitated with 2L NS IV and medicated with Ondansetron 4 mg IV. Ordered for Beta-hydroxybutyric Acid, CBC with Diffss, CMP to evaluate her  symptoms.     10:39 AM - The patient is actively vomiting, will return after vomitinng has subsided for a more acurate history.    11:47 AM after receiving concerning labs, I spoke with the hospitalist regarding the patient's case. He accepts the patient for admission at this time.        HYDRATION: Based on the patient's presentation of DKA and Hyperglycemia the patient was given IV fluids. IV Hydration was used because oral hydration was not adequate alone. Upon recheck following hydration, the patient was Improved.      DISPOSITION:  Patient will be admitted to hospitalist in critical condition.      FINAL IMPRESSION  1. Diabetic ketoacidosis without coma associated with type 1 diabetes mellitus (HCC)    2. Non-intractable vomiting with nausea, unspecified vomiting type    3. Body aches    4. Hypokalemia        CRITICAL CARE  The very real possibilty of a deterioration of this patient's condition required the highest level of my preparedness for sudden, emergent intervention.  I provided critical care services, which included medication orders, frequent reevaluations of the patient's condition and response to treatment, ordering and reviewing test results, and discussing the case with various consultants.  The critical care time associated with the care of the patient was 30 minutes. Review chart for interventions. This time is exclusive of any other billable procedures.      Willie BRAMBILA (Darynibana laura), am scribing for, and in the presence of, Clifford Null D.O..    Electronically signed by: Willie Gagnon (Darynibana laura), 7/17/2019    Clifford BRAMBILA D.O. personally performed the services described in this documentation, as scribed by Willie Gagnon in my presence, and it is both accurate and complete.    The note accurately reflects work and decisions made by me.  Clifford Null  7/17/2019  12:49 PM

## 2019-07-17 NOTE — CONSULTS
Critical Care Consultation    Date of consult: 7/17/2019    Referring Physician  Clifford Null D.O.    Reason for Consultation  DKA    History of Presenting Illness  28 y.o. female who presented 7/17/2019 with poorly controlled IDDM with last A1c 12.6 in 3/2019, reported history of gastroparesis however negative gastric emptying study 12/2018, migraines, numerous previous admissions for DKA with most recent from 7/8-12.  Patient indicates she was doing well upon discharge, however this morning woke up and noted her blood sugar to be quite elevated and had associated nausea and vomiting and generalized abdominal discomfort.  She took 30 units of insulin without significant improvement in her blood sugar and decided to present to ED.    Again patient had gastric emptying study 12/2018 which was WNL, EGD 6/2018 with pyloric dilation and no improvement in underlying symptoms, has previously been on Reglan/Zyprexa/erythromycin with some improvement, has seen GI many times in the past with last being 7/2018 with recommendation of follow-up in academic facility given negative studies.      Code Status  Full Code    Review of Systems  Review of Systems   Constitutional: Negative for chills and fever.   HENT: Negative for congestion.    Eyes: Negative for blurred vision and double vision.   Respiratory: Negative for cough, sputum production and shortness of breath.    Cardiovascular: Negative for chest pain and palpitations.   Gastrointestinal: Positive for abdominal pain, nausea and vomiting. Negative for diarrhea.   Genitourinary: Positive for frequency. Negative for dysuria.   Neurological: Positive for weakness. Negative for focal weakness.   Psychiatric/Behavioral: Negative for depression.   All other systems reviewed and are negative.      Past Medical History   has a past medical history of Backpain; Bronchitis; Diabetes type 1, controlled ([facility]); DKA (diabetic ketoacidoses) ([facility]); Fall; Gastroparesis; Kidney  infection; Pneumonia; and UTI (urinary tract infection).    Surgical History   has a past surgical history that includes gastroscopy-endo (9/18/2014); gastroscopy with biopsy (9/18/2014); other; submandible abscess incision and drainage (Left, 11/8/2017); dental extraction(s) (11/8/2017); and gastroscopy-endo (N/A, 6/9/2018).    Family History  family history includes Cancer in her unknown relative; Hypertension in her maternal grandfather.    Social History   reports that she has never smoked. She has never used smokeless tobacco. She reports that she does not drink alcohol or use drugs.    Medications  Home Medications     Reviewed by Blade Brice (Pharmacy Tech) on 07/17/19 at 1118  Med List Status: Complete   Medication Last Dose Status   Insulin Glargine (BASAGLAR KWIKPEN) 100 UNIT/ML Solution Pen-injector 7/17/2019 Active   insulin lispro (HUMALOG) 100 UNIT/ML Solution 7/16/2019 Active   SUMAtriptan (IMITREX) 50 MG Tab > 1 week Active              Current Facility-Administered Medications   Medication Dose Route Frequency Provider Last Rate Last Dose   • ketorolac (TORADOL) injection 30 mg  30 mg Intravenous Once Clifford Null D.O.       • potassium chloride (KCL) ivpb 10 mEq  10 mEq Intravenous Q HOUR Lior Acuña M.D.       • calcium CHLORIDE 1 g in  mL IVPB  1,000 mg Intravenous Once Lior Acuña M.D.       • lactated ringers infusion (BOLUS)  2,000 mL Intravenous Once Lior Acuña M.D.        Or   • NS (BOLUS) infusion 2,000 mL  2,000 mL Intravenous Once Lior Acuña M.D.       • dextrose 10% and 0.45% NaCl infusion   Intravenous Continuous Lior Acuña M.D.       • senna-docusate (PERICOLACE or SENOKOT S) 8.6-50 MG per tablet 2 Tab  2 Tab Oral BID Lior Acuña M.D.        And   • polyethylene glycol/lytes (MIRALAX) PACKET 1 Packet  1 Packet Oral QDAY PRN Lior Acuña M.D.        And   • magnesium hydroxide (MILK OF MAGNESIA) suspension 30 mL  30 mL Oral QDAY PRN Lior GE  BRIGIDA Acuña        And   • bisacodyl (DULCOLAX) suppository 10 mg  10 mg Rectal QDAY PRN Lior Acuña M.D.       • MD ALERT-PHARMACY TO CONSULT FOR DKA MONITORING 1 Each  1 Each Other PRN Lior Acuña M.D.       • Adult DKA potassium(K+) replacement scale  1 Each Intravenous Q4HRS Lior Acuña M.D.       • magnesium sulfate IVPB premix 2 g  2 g Intravenous Once PRN Lior Acuña M.D.        Or   • magnesium sulfate IVPB premix 4 g  4 g Intravenous Once PRN Lior Acuña M.D.       • potassium phosphates 30 mmol in  mL ivpb  30 mmol Intravenous Once PRN Lior Acuña M.D.        Or   • sodium phosphate 30 mmol in 1/2  mL ivpb  30 mmol Intravenous Once PRN Lior Acuña M.D.       • 0.9 % NaCl with KCl 20 mEq infusion   Intravenous Continuous Lior Acuña M.D.       • D5LR infusion   Intravenous Continuous Lior Acuña M.D.       • enoxaparin (LOVENOX) inj 40 mg  40 mg Subcutaneous DAILY Lior Acuña M.D.       • acetaminophen (TYLENOL) tablet 650 mg  650 mg Oral Q6HRS PRN Lior Acuña M.D.       • ondansetron (ZOFRAN) syringe/vial injection 4 mg  4 mg Intravenous Q4HRS PRN Lior Acuña M.D.       • ondansetron (ZOFRAN ODT) dispertab 4 mg  4 mg Oral Q4HRS PRN Lior Acuña M.D.       • promethazine (PHENERGAN) tablet 12.5-25 mg  12.5-25 mg Oral Q4HRS PRN Lior Acuña M.D.       • promethazine (PHENERGAN) suppository 12.5-25 mg  12.5-25 mg Rectal Q4HRS PRN Lior Acuña M.D.       • prochlorperazine (COMPAZINE) injection 5-10 mg  5-10 mg Intravenous Q4HRS PRN Lior Acuña M.D.       • MD ALERT-INSULIN DRIP INITIAL RATE BY PHARMACY AT 0.1 UNITS/KG/HR 1 Each  1 Each Other PRN Lior M Domenico, M.D.       • insulin regular (HUMULIN R) injection 8 Units  8 Units Subcutaneous Once Hugo Joiner M.D.         Current Outpatient Prescriptions   Medication Sig Dispense Refill   • SUMAtriptan (IMITREX) 50 MG Tab Take 50 mg by mouth Once PRN for Migraine. Can repeat in 2 hrs if needed     •  insulin lispro (ADMELOG) 100 UNIT/ML Solution Inject 10-18 Units as instructed 3 times a day before meals. Sliding Scale  Pt cannot define sliding scale     • Insulin Glargine (BASAGLAR KWIKPEN) 100 UNIT/ML Solution Pen-injector Inject 30 Units as instructed 2 Times a Day. 1 PEN 11       Allergies  Allergies   Allergen Reactions   • Pcn [Penicillins] Shortness of Breath and Swelling     Per patient's Mom, patient tolerates Keflex   • Lisinopril Unspecified     Per historical: Reported pedal swelling. No facial/angioedema or rash nor respiratory symptoms.    • Promethazine Vomiting       Vital Signs last 24 hours  Temp:  [36.8 °C (98.3 °F)] 36.8 °C (98.3 °F)  Pulse:  [125] 125    Physical Exam  Physical Exam   Constitutional: She appears distressed.   Acute on chronically ill-appearing   HENT:   Head: Normocephalic and atraumatic.   Eyes: Pupils are equal, round, and reactive to light. Conjunctivae are normal.   Neck: No tracheal deviation present.   Cardiovascular: Intact distal pulses.    Tachycardic   Pulmonary/Chest: She has no wheezes. She has no rales.   Abdominal: Soft. There is tenderness. There is no rebound and no guarding.   Musculoskeletal: She exhibits no edema.   Neurological: No cranial nerve deficit.   Skin: Skin is warm and dry.   Nursing note and vitals reviewed.      Fluids    Intake/Output Summary (Last 24 hours) at 07/17/19 1317  Last data filed at 07/17/19 1208   Gross per 24 hour   Intake             2000 ml   Output                0 ml   Net             2000 ml       Laboratory  Recent Results (from the past 48 hour(s))   ACCU-CHEK GLUCOSE    Collection Time: 07/17/19 10:30 AM   Result Value Ref Range    Glucose - Accu-Ck 436 (HH) 65 - 99 mg/dL   EKG    Collection Time: 07/17/19 10:33 AM   Result Value Ref Range    Report       [facility] Dept.    Test Date:  2019-07-17  Pt Name:    AMANDA BRANCH            Department: ER  MRN:        [id number]                       Room:       [facility]  Gender:     Female                       Technician: [id number]  :        1990                   Requested By:ER TRIAGE PROTOCOL  Order #:    [id number]                    Reading MD:    Measurements  Intervals                                Axis  Rate:       124                          P:          71  RI:         128                          QRS:        40  QRSD:       66                           T:          35  QT:         328  QTc:        471    Interpretive Statements  SINUS TACHYCARDIA  Compared to ECG 2019 13:03:50  T-wave abnormality no longer present     CBC w/ Differential    Collection Time: 19 10:58 AM   Result Value Ref Range    WBC 8.2 4.8 - 10.8 K/uL    RBC 3.62 (L) 4.20 - 5.40 M/uL    Hemoglobin 9.9 (L) 12.0 - 16.0 g/dL    Hematocrit 31.8 (L) 37.0 - 47.0 %    MCV 87.8 81.4 - 97.8 fL    MCH 27.3 27.0 - 33.0 pg    MCHC 31.1 (L) 33.6 - 35.0 g/dL    RDW 50.2 (H) 35.9 - 50.0 fL    Platelet Count 225 164 - 446 K/uL    MPV 10.3 9.0 - 12.9 fL    Neutrophils-Polys 57.60 44.00 - 72.00 %    Lymphocytes 30.20 22.00 - 41.00 %    Monocytes 9.50 0.00 - 13.40 %    Eosinophils 1.00 0.00 - 6.90 %    Basophils 0.40 0.00 - 1.80 %    Immature Granulocytes 1.30 (H) 0.00 - 0.90 %    Nucleated RBC 0.00 /100 WBC    Neutrophils (Absolute) 4.70 2.00 - 7.15 K/uL    Lymphs (Absolute) 2.47 1.00 - 4.80 K/uL    Monos (Absolute) 0.78 0.00 - 0.85 K/uL    Eos (Absolute) 0.08 0.00 - 0.51 K/uL    Baso (Absolute) 0.03 0.00 - 0.12 K/uL    Immature Granulocytes (abs) 0.11 0.00 - 0.11 K/uL    NRBC (Absolute) 0.00 K/uL   Complete Metabolic Panel (CMP)    Collection Time: 19 10:58 AM   Result Value Ref Range    Sodium 142 135 - 145 mmol/L    Potassium 3.4 (L) 3.6 - 5.5 mmol/L    Chloride 118 (H) 96 - 112 mmol/L    Co2 5 (LL) 20 - 33 mmol/L    Anion Gap 19.0 (H) 0.0 - 11.9    Glucose 435 (H) 65 - 99 mg/dL    Bun 18 8 - 22 mg/dL    Creatinine 0.55 0.50 - 1.40 mg/dL    Calcium 5.8 (LL) 8.5 - 10.5 mg/dL     AST(SGOT) 15 12 - 45 U/L    ALT(SGPT) 19 2 - 50 U/L    Alkaline Phosphatase 83 30 - 99 U/L    Total Bilirubin 0.3 0.1 - 1.5 mg/dL    Albumin 2.4 (L) 3.2 - 4.9 g/dL    Total Protein 4.2 (L) 6.0 - 8.2 g/dL    Globulin 1.8 (L) 1.9 - 3.5 g/dL    A-G Ratio 1.3 g/dL   BETA-HYDROXYBUTYRIC ACID    Collection Time: 07/17/19 10:58 AM   Result Value Ref Range    beta-Hydroxybutyric Acid >8.00 (H) 0.02 - 0.27 mmol/L   ESTIMATED GFR    Collection Time: 07/17/19 10:58 AM   Result Value Ref Range    GFR If African American >60 >60 mL/min/1.73 m 2    GFR If Non African American >60 >60 mL/min/1.73 m 2   HCG QUAL SERUM    Collection Time: 07/17/19 10:58 AM   Result Value Ref Range    Beta-Hcg Qualitative Serum Negative Negative       Imaging  IR-MIDLINE CATHETER INSERTION WO GUIDANCE > AGE 3    (Results Pending)       Assessment/Plan  DKA, type 1, not at goal (CMS-HCC)- (present on admission)   Assessment & Plan    Repeated events with most recent being approximately 1 week ago  DKA protocol  Start insulin infusion with hourly titration  Give 8 units regular insulin IV now  Every 4 hours BMP  Aggressive electrolyte replacement  Continuous telemetry monitoring  Keep n.p.o. for now  UA and lipase levels       Hypokalemia- (present on admission)   Assessment & Plan    Replace to keep greater than 4  Check magnesium level     CHERELLE (acute kidney injury) (HCC)- (present on admission)   Assessment & Plan    Suspect prerenal  Renally dose medications minimize nephrotoxic substances  Monitor urine output  Continue with IV maintenance fluids     Pyloric stenosis- (present on admission)   Assessment & Plan    History of with previous EG 6/2018 with dilation  Did not improve repeated episodes of nausea/vomiting/abdominal pain     Hypocalcemia- (present on admission)   Assessment & Plan    Replace         Discussed patient condition and risk of morbidity and/or mortality with Hospitalist, RN, Pharmacy and Patient.    The patient remains  critically ill.  Critical care time = 45 minutes in directly providing and coordinating critical care and extensive data review.  No time overlap and excludes procedures.

## 2019-07-17 NOTE — PROCEDURES
Vascular Access Team    Date of Insertion: 7/17/19  Arm Circumference: n/a  Line Length: 10cm  Line Size: 18g  Vein Occupancy %: 29  Reason for Midline: lack of access, critical care  Labs: WBC 8.2, , BUN 18, Cr 0.55, GFR >60, INR n/a    Orders confirmed, vessel patency confirmed with ultrasound. Risks and benefits of procedure explained to patient and education regarding line associated bloodstream infections provided. Questions answered.     PowerGlide Midline placed in RUE per MD order with ultrasound guidance. 18g, 10 cm line placed in basilic vein after 1 attempt(s).  Catheter inserted with good blood return. Secured with 0cm external from insertion site.  Flushed without resistance with 10 mL 0.9% normal saline. Midline secured with Biopatch and Tegaderm.     Midline placement is confirmed by nurse using ultrasound and ability to flush and draw blood. Midline is appropriate for use at this time.  No X-ray is needed for placement confirmation. Pt tolerated procedure well.  Patient condition relayed to unit RN or ordering physician via this post procedure note in the EMR.    Ultrasound images uploaded to PACS and viewable in the EMR - yes  Ultrasound imaged printed and placed in paper chart - no      BARD PowerGlide Midline ref # [id number], Lot # [id number]

## 2019-07-17 NOTE — ASSESSMENT & PLAN NOTE
History of with previous EG 6/2018 with dilation  Did not improve repeated episodes of nausea/vomiting/abdominal pain

## 2019-07-17 NOTE — ASSESSMENT & PLAN NOTE
Suspect prerenal  Renally dose medications minimize nephrotoxic substances  Monitor urine output  Continue with IV maintenance fluids

## 2019-07-17 NOTE — ED NOTES
Med Rec complete per Pt at bedside and Pt's pharmacy  Allergies Reviewed  No ABX in the last 14 days    Pt cannot define sliding scale of ADEMLOG

## 2019-07-18 PROBLEM — N17.9 AKI (ACUTE KIDNEY INJURY) (HCC): Status: RESOLVED | Noted: 2019-03-20 | Resolved: 2019-01-01

## 2019-07-18 NOTE — PROGRESS NOTES
"Hospital Medicine Daily Progress Note    Date of Service  7/18/2019    Chief Complaint  High blood sugars and nausea vomiting    Hospital Course    28 y.o. female insulin-dependent diabetic admitted 7/17/2019 with diabetic ketoacidosis and nausea and vomiting.        Interval Problem Update  Patient is having improved anion gap closure but remains acidotic.  She is still nauseous and unable to eat we will leave her on insulin drip with the 180 protocol.  We will attempt to feed her will have antiemetics available.    Updated later on and evaluated patient she remains nauseated and only able to intake scantly of liquids.    Consultants/Specialty  Intensivist    Code Status  Full    Disposition  Monitor in ICU while on insulin drip    Review of Systems  Review of Systems   Constitutional: Positive for malaise/fatigue. Negative for chills and fever.   HENT: Positive for sore throat (\"from vomiting\"). Negative for congestion.    Respiratory: Negative for shortness of breath.    Cardiovascular: Negative for palpitations and leg swelling.   Gastrointestinal: Positive for abdominal pain and nausea. Negative for blood in stool, diarrhea and vomiting.   Genitourinary: Negative for dysuria and hematuria.   Musculoskeletal: Negative for back pain and neck pain.   Neurological: Positive for headaches. Negative for dizziness.   Psychiatric/Behavioral: The patient is not nervous/anxious.         Physical Exam  Temp:  [37 °C (98.6 °F)-37.8 °C (100 °F)] 37.8 °C (100 °F)  Pulse:  [] 109  Resp:  [15-29] 20  SpO2:  [96 %-100 %] 96 %    Physical Exam   Constitutional: She appears well-developed and well-nourished. No distress.   HENT:   Head: Normocephalic and atraumatic.   Nose: Nose normal.   Mouth/Throat: Oropharynx is clear and moist.   Eyes: Conjunctivae and EOM are normal. Right eye exhibits no discharge. Left eye exhibits no discharge. No scleral icterus.   Neck: Normal range of motion. No tracheal deviation present. "   Cardiovascular: Normal rate, regular rhythm and normal heart sounds.    No murmur heard.  Pulses:       Radial pulses are 2+ on the right side, and 2+ on the left side.        Dorsalis pedis pulses are 2+ on the right side, and 2+ on the left side.   Pulmonary/Chest: Effort normal and breath sounds normal. No stridor. No respiratory distress. She has no wheezes.   Abdominal: Soft. Bowel sounds are normal. She exhibits no distension. There is no tenderness.   Musculoskeletal: She exhibits no edema.   Neurological: She is alert. No cranial nerve deficit.   Skin: Skin is warm. She is not diaphoretic.   Psychiatric: She has a normal mood and affect. Her behavior is normal. Thought content normal.   Vitals reviewed.      Fluids    Intake/Output Summary (Last 24 hours) at 07/18/19 1958  Last data filed at 07/18/19 1800   Gross per 24 hour   Intake          3898.24 ml   Output             3460 ml   Net           438.24 ml       Laboratory  Recent Labs      07/17/19   1058  07/17/19   1905  07/18/19   0603   WBC  8.2  14.9*  8.1   RBC  3.62*  3.91*  3.72*   HEMOGLOBIN  9.9*  10.2*  9.9*   HEMATOCRIT  31.8*  33.5*  31.4*   MCV  87.8  85.7  84.4   MCH  27.3  26.1*  26.6*   MCHC  31.1*  30.4*  31.5*   RDW  50.2*  49.0  49.3   PLATELETCT  225  216  193   MPV  10.3  9.9  9.7     Recent Labs      07/18/19   0603  07/18/19   0910  07/18/19   1545   SODIUM  142  144  144   POTASSIUM  3.7  3.7  3.2*   CHLORIDE  119*  118*  115*   CO2  17*  18*  15*   GLUCOSE  126*  128*  287*   BUN  13  11  9   CREATININE  0.53  0.53  0.50   CALCIUM  7.7*  7.6*  8.0*                   Imaging  DX-CHEST-LIMITED (1 VIEW)   Final Result      1.  New right IJV central line tip projects in satisfactory position. No pneumothorax.      2.  No pulmonary consolidation is identified.      IR-MIDLINE CATHETER INSERTION WO GUIDANCE > AGE 3   Final Result                  Ultrasound-guided midline placement performed by qualified nursing staff    as above.                Assessment/Plan  * DKA, type 1, not at goal (CMS-HCC)- (present on admission)   Assessment & Plan    Severe diabetic ketoacidosis with glucose 435 and a bicarb of 5  Antiemetics for nausea/vomiting  Transitioning off DKA protocol to insulin 180 protocol tender to ongoing nausea but closure of anion gap and improved acidosis  Holding her outpatient glargine  Monitoring every hour Accu-Cheks and every 4 hours BMP still     Diabetes type I (CMS-HCC)- (present on admission)   Assessment & Plan    Normally on glargine 30 units twice a day and Humalog prior to meals  Patient has been on insulin drip for DKA protocol  We have been monitoring her BMPs every 4 hours and q. hourly Accu-Cheks  Adjusted to insulin 180 protocol with closure of anion gap improving acidosis and attempting a diet  Poorly controlled diabetes with a glycohemoglobin of 12.6 in the past 4 months     Hematemesis- (present on admission)   Assessment & Plan    Antiemetics  Reglan  Likely associated nausea vomiting     Hypokalemia- (present on admission)   Assessment & Plan    K scale  Monitor and replace     Hypocalcemia- (present on admission)   Assessment & Plan    Calcium has improved status post IV calcium  Monitoring            VTE prophylaxis: Enoxaparin

## 2019-07-18 NOTE — ASSESSMENT & PLAN NOTE
Gastroccult positive emesis on 7/17  Suspect esophagitis v.  Gastritis v.  Virgen-Babin (after days of retching)  No bright red blood, no evidence of active bleeding  Gastroenterology consultation is clinically indicated    Resolved

## 2019-07-18 NOTE — PROGRESS NOTES
2310 - Discussed pt case with Dr. Adkins on-call hospitalist regarding patient's nausea/vomiting and abdominal pain.  Pt unable to keep PO medications down at this time.  MD gave order for IV morphine 1-2 mg Q4H PRN pain.  Discussed patient's tachycardia and increasing temperature as well as fluid resuscitation measures during day shift.  To continue monitoring heart rate and temperature at this time.

## 2019-07-18 NOTE — PROGRESS NOTES
Discussed newest blood sugar result with Dr. Pierson on-call hospitalist. At this time blood sugar is 98. We discussed recent lab results. At this time ordered to cut insulin gtt rate in half.  Was going at 6 units/hr, titrated to run at 3 units/hr.

## 2019-07-18 NOTE — PROGRESS NOTES
Recvd bedside report from PM RN Sherly. Pt was asleep, FSBG was checked and K was hung. Pt woke up with N/V.RN as able to talk with pt, Pt was ANOX 4, Denies pain.Pt was tachy 1teens to 120's per Previous RN pt has been ST. Will follow up with MD.  At this time does not require anything from RN.

## 2019-07-18 NOTE — CARE PLAN
Problem: Fluid Volume:  Goal: Will maintain balanced intake and output  Outcome: PROGRESSING SLOWER THAN EXPECTED    Intervention: Monitor, educate, and encourage compliance with therapeutic intake of liquids  With patients consistent vomiting, educated her on a slower process of eating. Collaborated with the MD he stated to have her try as much as she can tolerate.

## 2019-07-18 NOTE — PROGRESS NOTES
Pt transferred to CICU at 1500 on monitor with lisbet RN and [facility]. Pt resting comfortably in room with no needs currently. Belongings and call light within reach

## 2019-07-18 NOTE — PROGRESS NOTES
0055 - Discussed with Kenia in chemistry that labs BMP, Magnesium and Phosphorus have still not resulted for this patient.  Kenia stated she would look into issue and why labs were not resulted yet.     0122 - Discussed with Kenia in chemistry again, she states labs need to be redrawn because they were perhaps sucked into an inlet.      0158 - Made another call to lab regarding where lab results were after sending new labs again.  Tech stated the BMP was running, reminded tech I need phosphorus and magnesium collected also that I had released and collected through the computer.

## 2019-07-18 NOTE — PROGRESS NOTES
Critical Care Progress Note    Date of admission  7/17/2019    Chief Complaint  28 y.o. female who presented 7/17/2019 with poorly controlled IDDM with last A1c 12.6 in 3/2019, reported history of gastroparesis however negative gastric emptying study 12/2018, migraines, numerous previous admissions for DKA with most recent from 7/8-12.  Patient indicates she was doing well upon discharge, however this morning woke up and noted her blood sugar to be quite elevated and had associated nausea and vomiting and generalized abdominal discomfort.  She took 30 units of insulin without significant improvement in her blood sugar and decided to present to ED.     Again patient had gastric emptying study 12/2018 which was WNL, EGD 6/2018 with pyloric dilation and no improvement in underlying symptoms, has previously been on Reglan/Zyprexa/erythromycin with some improvement, has seen GI many times in the past with last being 7/2018 with recommendation of follow-up in academic facility given negative studies.(Copied from original consult note)    Hospital Course    She arrived in the intensive care with persistent severe metabolic acidosis, tachycardia and hypotension requiring further aggressive volume resuscitation.      Interval Problem Update  Reviewed last 24 hour events:              - No acute events overnight              - Tm: Afebrile              - HR: 97-1 07              - SBP: 117-137              - Neuro:               - GI: N.p.o.              - UOP: 3 L              - Pitt: Yes              - Lines: CVC, midline, PIV              - PPx: Lovenox, no GI              - Antibiotic Day    Infusions:  Titrating insulin    AG- 6  Alb- 3    Emesis sample yesterday positive for occult blood.    Review of Systems  Review of Systems   Constitutional: Negative for chills and fever.   HENT: Negative for congestion, sinus pain and sore throat.    Eyes: Negative for blurred vision and double vision.   Respiratory: Positive for  cough. Negative for hemoptysis and shortness of breath.    Cardiovascular: Negative for chest pain.   Gastrointestinal: Positive for abdominal pain, nausea and vomiting.   Genitourinary: Negative.    Musculoskeletal: Positive for myalgias.   Neurological: Negative.    All other systems reviewed and are negative.       Vital Signs for last 24 hours   Temp:  [36.9 °C (98.4 °F)-37.3 °C (99.1 °F)] 37.2 °C (99 °F)  Pulse:  [] 114  Resp:  [15-33] 15  SpO2:  [96 %-100 %] 99 %    Hemodynamic parameters for last 24 hours       Respiratory Information for the last 24 hours       Physical Exam   Physical Exam   Constitutional: She is oriented to person, place, and time.   Ill-appearing, occasional coughing followed by retching   HENT:   Mouth/Throat: Oropharynx is clear and moist.   Eyes: Pupils are equal, round, and reactive to light.   Neck: Neck supple.   Cardiovascular:   Tachycardic regular rhythm no murmurs gallops rubs   Pulmonary/Chest: No respiratory distress. She has no wheezes. She has no rales.   Abdominal: Soft. She exhibits no distension and no mass. There is tenderness. There is no rebound and no guarding.   Emesis remains dark brown/maroon, Hemoccult positive yesterday.  No bright red blood   Musculoskeletal: She exhibits no edema.   Neurological: She is alert and oriented to person, place, and time. No cranial nerve deficit.   Skin: Skin is warm and dry.   Psychiatric: She has a normal mood and affect. Her behavior is normal.       Medications  Current Facility-Administered Medications   Medication Dose Route Frequency Provider Last Rate Last Dose   • lactated ringers infusion   Intravenous Continuous Cholo Lopez 50 mL/hr at 07/18/19 1035     • insulin regular (HUMULIN R) injection 0-14 Units  0-14 Units Intravenous Once Cholo Lopez   Stopped at 07/18/19 1030   • insulin regular human (HUMULIN/NOVOLIN R) 62.5 Units in  mL infusion per protocol  0-29 Units/hr Intravenous Continuous Cholo BATES     Stopped at 07/18/19 1412   • DEXTROSE 10% BOLUS 125-250 mL  125-250 mL Intravenous PRN Cholo Lopez       • senna-docusate (PERICOLACE or SENOKOT S) 8.6-50 MG per tablet 2 Tab  2 Tab Oral BID Lior Acuña M.D.        And   • polyethylene glycol/lytes (MIRALAX) PACKET 1 Packet  1 Packet Oral QDAY PRN Lior Acuña M.D.        And   • magnesium hydroxide (MILK OF MAGNESIA) suspension 30 mL  30 mL Oral QDAY PRN Lior Acuña M.D.        And   • bisacodyl (DULCOLAX) suppository 10 mg  10 mg Rectal QDAY PRN Lior Acuña M.D.       • enoxaparin (LOVENOX) inj 40 mg  40 mg Subcutaneous DAILY Lior Acuña M.D.   40 mg at 07/17/19 1737   • acetaminophen (TYLENOL) tablet 650 mg  650 mg Oral Q6HRS PRN Lior Acuña M.D.   650 mg at 07/17/19 1556   • ondansetron (ZOFRAN) syringe/vial injection 4 mg  4 mg Intravenous Q4HRS PRN Lior Acuña M.D.   4 mg at 07/18/19 0405   • ondansetron (ZOFRAN ODT) dispertab 4 mg  4 mg Oral Q4HRS PRN Lior Acuña M.D.       • prochlorperazine (COMPAZINE) injection 5-10 mg  5-10 mg Intravenous Q4HRS PRN Lior Acuña M.D.   10 mg at 07/18/19 0019   • metoclopramide (REGLAN) injection 10 mg  10 mg Intravenous Q6HRS Hugo Joiner M.D.   10 mg at 07/18/19 1218   • OLANZapine (ZYPREXA) tablet 5 mg  5 mg Oral Q EVENING Hugo Joiner M.D.   5 mg at 07/17/19 1737   • morphine (pf) 4 mg/ml injection 1-2 mg  1-2 mg Intravenous Q4HRS PRN Yessenia Adkins D.O.   2 mg at 07/18/19 1218       Fluids    Intake/Output Summary (Last 24 hours) at 07/18/19 1425  Last data filed at 07/18/19 0800   Gross per 24 hour   Intake             4337 ml   Output             3860 ml   Net              477 ml       Laboratory          Recent Labs      07/17/19   1522   07/18/19   0128  07/18/19   0603  07/18/19   0910   SODIUM  139   < >  144  142  144   POTASSIUM  5.6*   < >  3.4*  3.7  3.7   CHLORIDE  107   < >  119*  119*  118*   CO2  <5*   < >  16*  17*  18*   BUN  29*   < >  16  13  11   CREATININE   1.23   < >  0.68  0.53  0.53   MAGNESIUM  2.1   --   1.5  2.2   --    PHOSPHORUS  6.4*   --   <1.0*  2.6   --    CALCIUM  9.6   < >  8.0*  7.7*  7.6*    < > = values in this interval not displayed.     Recent Labs      07/17/19   1058  07/17/19   1522  07/17/19   1905  07/18/19   0128  07/18/19   0603  07/18/19   0910   ALTSGPT  19   --   24   --    --    --    ASTSGOT  15   --   19   --    --    --    ALKPHOSPHAT  83   --   103*   --    --    --    TBILIRUBIN  0.3   --   0.2   --    --    --    LIPASE   --   15   --    --    --    --    GLUCOSE  435*  628*  143*  123*  126*  128*     Recent Labs      07/17/19   1058  07/17/19   1905 07/18/19   0603   WBC  8.2  14.9*  8.1   NEUTSPOLYS  57.60  70.80   --    LYMPHOCYTES  30.20  15.10*   --    MONOCYTES  9.50  11.00   --    EOSINOPHILS  1.00  0.10   --    BASOPHILS  0.40  0.30   --    ASTSGOT  15  19   --    ALTSGPT  19 24   --    ALKPHOSPHAT  83  103*   --    TBILIRUBIN  0.3  0.2   --      Recent Labs      07/17/19   1058  07/17/19   1905  07/18/19   0603   RBC  3.62*  3.91*  3.72*   HEMOGLOBIN  9.9*  10.2*  9.9*   HEMATOCRIT  31.8*  33.5*  31.4*   PLATELETCT  225  216  193       Imaging  X-Ray:  No film today    Assessment/Plan  * DKA, type 1, not at goal (CMS-HCC)- (present on admission)   Assessment & Plan    DKA protocol  Anion gap closed, however patient not tolerating oral diet  Transition to insulin 180 protocol, until she can tolerate a diet.  Maintain intravascular euvolemia  Monitor and replete electrolytes as clinically indicated         Hematemesis- (present on admission)   Assessment & Plan    Gastroccult positive emesis on 7/17  Suspect esophagitis v.  Gastritis v.  Virgen-Babin (after days of retching)  No bright red blood, no evidence of active bleeding  Gastroenterology consultation is clinically indicated  Protonix         Hypokalemia- (present on admission)   Assessment & Plan    Replace to keep greater than 4  Check magnesium level     Pyloric  stenosis- (present on admission)   Assessment & Plan    History of with previous EG 6/2018 with dilation  Did not improve repeated episodes of nausea/vomiting/abdominal pain     Hypocalcemia- (present on admission)   Assessment & Plan    Replace          VTE:  Lovenox  Ulcer: PPI  Lines: Central Line  Ongoing indication addressed    I have performed a physical exam and reviewed and updated ROS and Plan today (7/18/2019). In review of yesterday's note (7/17/2019), there are no changes except as documented above.     Discussed patient condition and risk of morbidity and/or mortality with Hospitalist, RN, Pharmacy, Charge nurse / hot rounds and Patient    The patient remains critically ill in the ICU with acute DKA.  I have assessed and reassessed the blood glucose and acid-base status with titration of an insulin drip, blood pressure, hemodynamics, urine output and response to IV fluid resuscitation. There is significant risk for worsening metabolic and endocrine system dysfunction including life threatening hypokalemia and hypophosphotemia.    Critical care time 40 minutes in directly providing and coordinating critical care and extensive data review.  No time overlap and excludes procedures.

## 2019-07-18 NOTE — PROGRESS NOTES
2 RN skin check complete with Nhi RN    Bilateral elbows red but blanching  Right forehead has abrasion  Hells callous, red and blanching.rest of skin intact with minor scattered scrapes and bruises

## 2019-07-18 NOTE — ASSESSMENT & PLAN NOTE
Severe diabetic ketoacidosis with glucose 435 and a bicarb of 5  Antiemetics for nausea/vomiting  7/19 unable to transition off diabetic ketoacidosis protocol secondary to patient not being able to eat.  She was initially placed on insulin 180 protocol however she went back in the DKA.  I have ordered for interventional radiology to place a port Monday 7/22  Transition to outpatient glargine and start ADA diet 7/21  Monitoring  Accu-Cheks and cover with SSI

## 2019-07-18 NOTE — H&P
Hospital Medicine History & Physical Note    Date of Service  7/17/2019    Primary Care Physician  Pcp Not In Computer    Consultants  I discussed with Dr. Joiner, critical care    Code Status  full    Chief Complaint  vomiting    History of Presenting Illness  28 y.o. female who presented 7/17/2019 with intractable vomiting. Ms. Sparks has a possible history of insulin-dependent diabetes mellitus since she was 16 years old there is very well-known to the [facility]. She has had numerous admissions for diabetic ketoacidosis the last being 7/8/2019 through 7/12/2019.  She states that she developed vomiting this morning took her blood sugar found and that was in the 400s so she took her usual glargine 30 units though repeat glucoses remain in the 400s therefore she was brought to the emergency room where she is found to be in diabetic ketoacidosis.  In the emergency him, she is tachypneic and in significant distress and is unable to give a reliable history except that mentioned above.  She does note that she is been compliant with her insulin though.    Review of Systems  Review of Systems   Unable to perform ROS: Mental acuity       Past Medical History   has a past medical history of Backpain; Bronchitis; Diabetes type 1, controlled (HCC); DKA (diabetic ketoacidoses) (HCC); Fall; Gastroparesis; Kidney infection; Pneumonia; and UTI (urinary tract infection).    Surgical History   has a past surgical history that includes gastroscopy-endo (9/18/2014); gastroscopy with biopsy (9/18/2014); other; submandible abscess incision and drainage (Left, 11/8/2017); dental extraction(s) (11/8/2017); and gastroscopy-endo (N/A, 6/9/2018).     Family History  family history includes Cancer in her unknown relative; Hypertension in her maternal grandfather.     Social History   reports that she has never smoked. She has never used smokeless tobacco. She reports that she does not drink alcohol or use drugs.  She  lives with her family in [facility]    Allergies  Allergies   Allergen Reactions   • Pcn [Penicillins] Shortness of Breath and Swelling     Per patient's Mom, patient tolerates Keflex   • Lisinopril Unspecified     Per historical: Reported pedal swelling. No facial/angioedema or rash nor respiratory symptoms.    • Promethazine Vomiting       Medications  Prior to Admission Medications   Prescriptions Last Dose Informant Patient Reported? Taking?   Insulin Glargine (BASAGLAR KWIKPEN) 100 UNIT/ML Solution Pen-injector 7/17/2019 at AM Patient No No   Sig: Inject 30 Units as instructed 2 Times a Day.   SUMAtriptan (IMITREX) 50 MG Tab > 1 week at PRN Patient Yes Yes   Sig: Take 50 mg by mouth Once PRN for Migraine. Can repeat in 2 hrs if needed   insulin lispro (ADMELOG) 100 UNIT/ML Solution   Yes Yes   Sig: Inject 10-18 Units as instructed 3 times a day before meals. Sliding Scale  Pt cannot define sliding scale      Facility-Administered Medications: None       Physical Exam  Temp:  [36.8 °C (98.3 °F)-37.3 °C (99.1 °F)] 37.3 °C (99.1 °F)  Pulse:  [111-153] 129  Resp:  [18-33] 19  SpO2:  [97 %-100 %] 100 %    Physical Exam   Constitutional: She appears well-developed. She appears distressed.   HENT:   Head: Normocephalic.   Very dry mucous membranes   Eyes: Right eye exhibits no discharge. Left eye exhibits no discharge. No scleral icterus.   Neck: Normal range of motion. Neck supple.   Cardiovascular:   Sinus tachycardia without a murmur   Pulmonary/Chest:   Tachypnea with good air movement   Abdominal:   Mildly distended, diffuse tenderness though no rebound no guarding   Musculoskeletal: She exhibits no edema or tenderness.   Neurological:   Sleepy, she does follow commands and moves all extremities x4   Skin: Skin is warm.   The skin of the right neck is irritated from previous tape from a central line   Psychiatric:   She appears to be quite ill and is unable to give a reliable history given her tachypnea  and decreased level of consciousness   Nursing note and vitals reviewed.      Laboratory:  Recent Labs      07/17/19   1058  07/17/19   1905   WBC  8.2  14.9*   RBC  3.62*  3.91*   HEMOGLOBIN  9.9*  10.2*   HEMATOCRIT  31.8*  33.5*   MCV  87.8  85.7   MCH  27.3  26.1*   MCHC  31.1*  30.4*   RDW  50.2*  49.0   PLATELETCT  225  216   MPV  10.3  9.9     Recent Labs      07/17/19   1058  07/17/19   1522  07/17/19   1905   SODIUM  142  139  145   POTASSIUM  3.4*  5.6*  4.9   CHLORIDE  118*  107  121*   CO2  5*  <5*  9*   GLUCOSE  435*  628*  143*   BUN  18  29*  22   CREATININE  0.55  1.23  0.87   CALCIUM  5.8*  9.6  7.8*     Recent Labs      07/17/19   1058  07/17/19   1522  07/17/19   1905   ALTSGPT  19   --   24   ASTSGOT  15   --   19   ALKPHOSPHAT  83   --   103*   TBILIRUBIN  0.3   --   0.2   LIPASE   --   15   --    GLUCOSE  435*  628*  143*         No results for input(s): NTPROBNP in the last 72 hours.      No results for input(s): TROPONINT in the last 72 hours.    Urinalysis:    No results found     Imaging:  DX-CHEST-LIMITED (1 VIEW)   Final Result      1.  New right IJV central line tip projects in satisfactory position. No pneumothorax.      2.  No pulmonary consolidation is identified.      IR-MIDLINE CATHETER INSERTION WO GUIDANCE > AGE 3   Final Result                  Ultrasound-guided midline placement performed by qualified nursing staff    as above.                Assessment/Plan:  I anticipate this patient will require at least two midnights for appropriate medical management, necessitating inpatient admission.    * DKA, type 1, not at goal (CMS-HCC)- (present on admission)   Assessment & Plan    Severe diabetic ketoacidosis with glucose 435 and a bicarb of 5  She will be admitted for IV fluids, hourly fingersticks with adjustments of her insulin drip  Basic metabolic panels every 4 hours and address her potassium, magnesium, and phosphorus levels  She will be admitted to the intensive care unit in  guarded condition  A midline catheter is been ordered due to poor IV access     Hypokalemia- (present on admission)   Assessment & Plan    She is unusual presentation with hypokalemia at 3.4 whereas typically patients in diabetic ketoacidosis have high hyperkalemia.  She will be given replacement and then placed on maintenance fluids plus or minus potassium based on her levels.  Be on continuous telemetry monitoring     Diabetes type I (CMS-[facility])- (present on admission)   Assessment & Plan    she is supposed to be on glargine insulin 30 units twice a day with Humalog sliding scale before meals     Hypocalcemia- (present on admission)   Assessment & Plan    Calcium in the emergency room is extremely low at 5  IV calcium chloride is been ordered  Repeat calcium will be checked  Continues telemetry monitoring           VTE prophylaxis: lovenox

## 2019-07-18 NOTE — CARE PLAN
Problem: Skin Integrity  Goal: Risk for impaired skin integrity will decrease  Outcome: PROGRESSING AS EXPECTED  Education with Q2 turns

## 2019-07-18 NOTE — PROCEDURES
Central Line Insertion  Date/Time: 7/17/2019 5:28 PM  Performed by: MATT CHRISTIANSON  Authorized by: MATT CHRISTIANSON     Consent:     Consent obtained:  Written    Consent given by:  Patient    Risks discussed:  Arterial puncture, bleeding, infection, pneumothorax and incorrect placement    Alternatives discussed:  Delayed treatment  Pre-procedure details:     Hand hygiene: Hand hygiene performed prior to insertion      Sterile barrier technique: All elements of maximal sterile technique followed      Skin preparation:  2% chlorhexidine  Sedation:     Sedation type:  None  Anesthesia:     Anesthesia method:  Local infiltration    Local anesthetic:  Lidocaine 1% w/o epi  Procedure details:     Location:  R internal jugular    Patient position:  Flat    Catheter size:  7 Fr    Landmarks identified: yes      Ultrasound guidance: yes      Sterile ultrasound techniques: Sterile gel and sterile probe covers were used      Number of attempts:  1    Successful placement: yes    Post-procedure details:     Post-procedure:  Dressing applied and line sutured    Assessment:  Blood return through all ports, free fluid flow, no pneumothorax on x-ray and placement verified by x-ray    Patient tolerance of procedure:  Tolerated well, no immediate complications  Comments:      Indications: Central vascular access to facilitate volume resuscitation for hypovolemic shock.

## 2019-07-18 NOTE — ASSESSMENT & PLAN NOTE
Holding home insulin.  Normally on glargine 30 units twice a day and Humalog prior to meals  Patient has been on insulin drip for DKA protocol until 7/21. Transition to home lantus  Advance diet as tolerates  Poorly controlled diabetes with a glycohemoglobin of 12.6 in the past 4 months

## 2019-07-18 NOTE — PROGRESS NOTES
Subjective: I was called stat to the bedside for tachycardia and hypotension.  In brief this is a 28-year-old brittle diabetic who is just admitted for diabetic ketoacidosis.  She has poor peripheral IV access.  Her prescribed IV fluid boluses have been limited due to her poor access.  She is only had 1 L IV fluid bolus since admission.    Objective:  V/S afebrile, heart rate 152 bpm sinus tachycardia on the monitor.  Blood pressure 91/45 respiratory rate 32 deep and rapid (Kusmal respirations)  General: Toxic ill-appearing woman with rapid deep breathing.  HEENT: Pale with dry mucosa  CV: Tachycardic regular rhythm no murmurs gallops rubs  Bedside ultrasound reveals hyperdynamic cardiac function no pericardial effusion RV is underfilled.  IVC 1.24 cm with respiratory variation.  Pulmonary: Breath sounds clear to auscultation bilaterally  Abdomen: Flat soft diffusely tender no rebound or guarding  Extremities: Nonedematous  Skin: There is lacy mottling over the knees to mid thigh as well as the hands and forearms.  Capillary refill 3 seconds    Most recent labs were reviewed.Since admission BMP has been repeated.  Her CO2 remains less than 5.  Glucose 628 anion gap 27.  Phosphorus 6.4    Assessment: Severe diabetic ketoacidosis with hypovolemic shock  Plan:  1.  Central venous access to facilitate IV fluid resuscitation  2.  Aggressive fluid resuscitation I estimate she will require a total of 4 to 6 L before she is adequately volume resuscitated to euvolemia.  3.  Continue with fixed insulin therapy regimen until anion gap closes  4.  Monitor and replete potassium and phosphate is clinically indicated      Critical care time 40 minutes of direct patient evaluation and management excluding bundle billable procedures.    Cholo Lopez MD  Critical care medicine

## 2019-07-18 NOTE — CARE PLAN
Problem: Fluid Volume:  Goal: Signs and symptoms of dehydration will decrease  Outcome: PROGRESSING AS EXPECTED  Patient received multiple fluid boluses, at this time labs are improving, signs of dehydration decreasing.  VS stabilizing.     Problem: Metabolic:  Goal: Ability to maintain appropriate glucose levels will improve  Outcome: PROGRESSING AS EXPECTED  Insulin gtt going, recent sugar 139.  Heading in right direction with sugars and labs.

## 2019-07-19 PROBLEM — R11.10 VOMITING: Status: ACTIVE | Noted: 2019-01-01

## 2019-07-19 NOTE — CARE PLAN
Problem: Safety  Goal: Will remain free from falls    Intervention: Implement fall precautions  Fall precautions in place, bed in lowest position, call light within reach, bed locked, clutter free environment, and enforced the use of call light.       Problem: Infection  Goal: Will remain free from infection    Intervention: Implement standard precautions and perform hand washing before and after patient contact  Standard precautions in place to prevent infection, handwashing done before and after pt contact.

## 2019-07-19 NOTE — PROGRESS NOTES
Critical Care Progress Note    Date of admission  7/17/2019    Chief Complaint  28 y.o. female who presented 7/17/2019 with poorly controlled IDDM with last A1c 12.6 in 3/2019, reported history of gastroparesis however negative gastric emptying study 12/2018, migraines, numerous previous admissions for DKA with most recent from 7/8-12.  Patient indicates she was doing well upon discharge, however this morning woke up and noted her blood sugar to be quite elevated and had associated nausea and vomiting and generalized abdominal discomfort.  She took 30 units of insulin without significant improvement in her blood sugar and decided to present to ED.     Again patient had gastric emptying study 12/2018 which was WNL, EGD 6/2018 with pyloric dilation and no improvement in underlying symptoms, has previously been on Reglan/Zyprexa/erythromycin with some improvement, has seen GI many times in the past with last being 7/2018 with recommendation of follow-up in academic facility given negative studies.(Copied from original consult note)    Hospital Course  She arrived in the intensive care with persistent severe metabolic acidosis, tachycardia and hypotension requiring further aggressive volume resuscitation.  Interval Problem Update  Reviewed last 24 hour events:  Neuro: a0x4 fatigue some morphine  HR: 's  SBP: 110-150's  Tmax: 37.8  GI: npo Q 1hr vomiting prior to arrival  UOP: good  Lines: central line  Resp: r/a   Vte: lovenox  PPI/H2:n/a  Antibx: none  Phos and k replaced  On Insulin dka    Insulin gtt 3 units/hr  Endocrine consult  Port for long term IV access    d5lr kCL @150ML/hr  Change insulin gtt to 1 unit/hr  Thiamine, MV, folate  Random cortisol  Toradol 15mg IV     Review of Systems  Review of Systems   Constitutional: Negative for chills and fever.   HENT: Negative for congestion, sinus pain and sore throat.    Eyes: Negative for blurred vision and double vision.   Respiratory: Negative for cough,  hemoptysis and shortness of breath.    Cardiovascular: Negative for chest pain.   Gastrointestinal: Positive for abdominal pain, nausea and vomiting.   Genitourinary: Negative.    Musculoskeletal: Positive for myalgias.   Neurological: Negative for tingling, tremors and headaches.   Psychiatric/Behavioral: Negative for depression and substance abuse. The patient is not nervous/anxious and does not have insomnia.    All other systems reviewed and are negative.       Vital Signs for last 24 hours   Temp:  [36.5 °C (97.7 °F)-37.8 °C (100 °F)] 36.5 °C (97.7 °F)  Pulse:  [] 98  Resp:  [16-28] 18  SpO2:  [94 %-98 %] 96 %    Hemodynamic parameters for last 24 hours       Respiratory Information for the last 24 hours       Physical Exam   Physical Exam   Constitutional: She is oriented to person, place, and time.   Sitting on left side with vomiting bag   HENT:   Mouth/Throat: Oropharynx is clear and moist.   Eyes: Pupils are equal, round, and reactive to light.   Neck: Neck supple.   Cardiovascular:   No murmur heard.  Tachycardic regular rhythm no murmurs gallops rubs   Pulmonary/Chest: No respiratory distress. She has no wheezes. She has no rales.   Abdominal: Soft. She exhibits no distension and no mass. There is tenderness. There is no rebound and no guarding.   Emesis remains dark brown/maroon, Hemoccult positive yesterday.  No bright red blood   Musculoskeletal: She exhibits no edema.   Neurological: She is alert and oriented to person, place, and time. No cranial nerve deficit.   Skin: Skin is warm and dry.   Psychiatric: She has a normal mood and affect. Her behavior is normal.   Ana depression, substance abuse       Medications  Current Facility-Administered Medications   Medication Dose Route Frequency Provider Last Rate Last Dose   • D5 LR with KCl 20 mEq infusion   Intravenous Continuous Hugo Benz M.D. 150 mL/hr at 07/19/19 1056     • K+ Scale: Goal of 4.5  1 Each Intravenous Q6HRS Hugo GARAY  BRIGIDA Benz   1 Each at 07/19/19 1200   • insulin regular (HUMULIN R) injection 0-14 Units  0-14 Units Intravenous Once Hugo Benz M.D.   Stopped at 07/19/19 1045   • insulin regular human (HUMULIN/NOVOLIN R) 62.5 Units in  mL infusion per protocol  0-29 Units/hr Intravenous Continuous Hugo Benz M.D. 4 mL/hr at 07/19/19 1057 1 Units/hr at 07/19/19 1057   • DEXTROSE 10% BOLUS 125-250 mL  125-250 mL Intravenous PRN Hugo Benz M.D.       • thiamine (B-1) 100 mg in D5W 100 mL IVPB  100 mg Intravenous DAILY Hugo Benz M.D.       • [START ON 7/20/2019] folic acid 1 mg in NS 50 mL IVPB  1 mg Intravenous Q24HRS Hugo Benz M.D.       • multivitamin (THERAGRAN) tablet 1 Tab  1 Tab Oral DAILY Hugo Benz M.D.   Stopped at 07/19/19 1045   • potassium chloride (KCL) ivpb 10 mEq  10 mEq Intravenous Once Hugo Benz M.D.       • potassium chloride in water (KCL) ivpb **Administer in ICU only** 20 mEq  20 mEq Intravenous Once Hugo Benz M.D. 50 mL/hr at 07/19/19 1116 20 mEq at 07/19/19 1116   • ketorolac (TORADOL) injection 15 mg  15 mg Intravenous Q6HRS PRN Hugo Benz M.D.       • senna-docusate (PERICOLACE or SENOKOT S) 8.6-50 MG per tablet 2 Tab  2 Tab Oral BID Lior Acuña M.D.        And   • polyethylene glycol/lytes (MIRALAX) PACKET 1 Packet  1 Packet Oral QDAY PRN Lior Acuña M.D.        And   • magnesium hydroxide (MILK OF MAGNESIA) suspension 30 mL  30 mL Oral QDAY PRN Lior Acuña M.D.        And   • bisacodyl (DULCOLAX) suppository 10 mg  10 mg Rectal QDAY PRN Lior Acuña M.D.       • enoxaparin (LOVENOX) inj 40 mg  40 mg Subcutaneous DAILY Lior Acuña M.D.   40 mg at 07/18/19 1800   • acetaminophen (TYLENOL) tablet 650 mg  650 mg Oral Q6HRS PRN Lior Acuña M.D.   650 mg at 07/17/19 1556   • ondansetron (ZOFRAN) syringe/vial injection 4 mg  4 mg Intravenous Q4HRS PRN Lior Acuña M.D.   4 mg at 07/19/19 0743   • ondansetron (ZOFRAN  ODT) dispertab 4 mg  4 mg Oral Q4HRS PRN Lior Acuña M.D.       • prochlorperazine (COMPAZINE) injection 5-10 mg  5-10 mg Intravenous Q4HRS PRN Lior Acuña M.D.   10 mg at 07/18/19 2330   • metoclopramide (REGLAN) injection 10 mg  10 mg Intravenous Q6HRS Hugo Joiner M.D.   10 mg at 07/19/19 1116   • OLANZapine (ZYPREXA) tablet 5 mg  5 mg Oral Q EVENING Hugo Joiner M.D.   5 mg at 07/17/19 1737   • morphine (pf) 4 mg/ml injection 1-2 mg  1-2 mg Intravenous Q4HRS PRN Yessenia Adkins D.O.   2 mg at 07/19/19 1003       Fluids    Intake/Output Summary (Last 24 hours) at 07/19/19 1214  Last data filed at 07/19/19 1200   Gross per 24 hour   Intake           4340.5 ml   Output             2605 ml   Net           1735.5 ml       Laboratory          Recent Labs      07/18/19   2025  07/19/19   0100  07/19/19   0745  07/19/19   1000   SODIUM  144  143  143  143   POTASSIUM  3.3*  3.3*  3.2*  3.1*   CHLORIDE  114*  113*  114*  113*   CO2  17*  20  21  21   BUN  7*  5*  4*  4*   CREATININE  0.54  0.43*  0.40*  0.36*   MAGNESIUM  2.2  2.2  2.0   --    PHOSPHORUS  1.9*  2.0*  2.5   --    CALCIUM  8.0*  7.7*  7.5*  7.3*     Recent Labs      07/17/19   1058  07/17/19   1522  07/17/19   1905   07/19/19   0100  07/19/19   0745  07/19/19   1000   ALTSGPT  19   --   24   --    --    --    --    ASTSGOT  15   -- 19   --    --    --    --    ALKPHOSPHAT  83   --   103*   --    --    --    --    TBILIRUBIN  0.3   --   0.2   --    --    --    --    LIPASE   --   15   --    --    --    --    --    GLUCOSE  435*  628*  143*   < >  146*  108*  125*    < > = values in this interval not displayed.     Recent Labs      07/17/19   1058  07/17/19   1905  07/18/19   0603  07/19/19   0300   WBC  8.2  14.9*  8.1  8.2   NEUTSPOLYS  57.60  70.80   --   68.90   LYMPHOCYTES  30.20  15.10*   --   20.30*   MONOCYTES  9.50  11.00   --   10.10   EOSINOPHILS  1.00  0.10   --   0.20   BASOPHILS  0.40  0.30   --   0.10   ASTSGOT  15  19   --     --    ALTSGPT  19 24   --    --    ALKPHOSPHAT  83  103*   --    --    TBILIRUBIN  0.3  0.2   --    --      Recent Labs      07/17/19   1905  07/18/19   0603  07/19/19   0300   RBC  3.91*  3.72*  3.85*   HEMOGLOBIN  10.2*  9.9*  10.6*   HEMATOCRIT  33.5*  31.4*  31.9*   PLATELETCT  216  193  200       Imaging  X-Ray:  No film today    Assessment/Plan  * DKA, type 1, not at goal (CMS-HCC)- (present on admission)   Assessment & Plan    DKA protocol  Anion gap closed, however patient not tolerating oral diet  Transition to insulin 180 protocol, until she can tolerate a diet.  Maintain intravascular euvolemia  Monitor and replete electrolytes as clinically indicated    Will consult endocrine since poorly controlled ? If a insulin pump cannidate  Will try to transition again of DKA protocol to 180 but with high fluid rate and monitor closely for going back into dka.     D5LR w/ kcl  Will ask IR to place port for frequent hospitalization and management of patient     Hematemesis- (present on admission)   Assessment & Plan    Gastroccult positive emesis on 7/17  Suspect esophagitis v.  Gastritis v.  Virgen-Babin (after days of retching)  No bright red blood, no evidence of active bleeding  Gastroenterology consultation is clinically indicated    Resolved          Hypokalemia- (present on admission)   Assessment & Plan    Replace to keep greater than 4  Check magnesium level     Vomiting   Assessment & Plan    Has chronic history of protracted vomiting  Continue sheduled reglan, compazine, zyprexa  Try to reduce narcotics  Gastric emptying study preformed 12/29/2018 183 minutes  Prior GI consultations have discussed possible j-tube and recommend [facility] Consultation       Pyloric stenosis- (present on admission)   Assessment & Plan    History of with previous EG 6/2018 with dilation  Did not improve repeated episodes of nausea/vomiting/abdominal pain     Hypocalcemia- (present on admission)   Assessment & Plan     Replace          VTE:  Lovenox  Ulcer: Not Indicated  Lines: Central Line  Ongoing indication addressed    I have performed a physical exam and reviewed and updated ROS and Plan today (7/19/2019). In review of yesterday's note (7/18/2019), there are no changes except as documented above.     Discussed patient condition and risk of morbidity and/or mortality with Hospitalist, RN, Pharmacy, Charge nurse / hot rounds and Patient    Patient remains in critical condition from DKA requiring insulin gtt with active titration. Critical care time provided was 40 minutes. This excludes all separate billable procedures.

## 2019-07-19 NOTE — PROGRESS NOTES
Received bedside report assumed care of pt. Pt resting comfortably in  bed Bedside table, and call light within reach. Bed alarm on and in lowest position.  Drips verified. VS stable. Updated whiteboard in room and discussed today's POC. DKA protocol started.

## 2019-07-19 NOTE — PROGRESS NOTES
Report received from Edgar RN. Patient in bed, sleeping, connected to monitors, lines and drips verified. Call light within reach.

## 2019-07-19 NOTE — ASSESSMENT & PLAN NOTE
Has chronic history of protracted vomiting  Continue sheduled reglan, compazine, zyprexa  Try to reduce narcotics  Gastric emptying study preformed 12/29/2018 183 minutes  Prior GI consultations have discussed possible j-tube and recommend [facility] Consultation  Replace vitamins Thiamine, folate, MVI    Cortisol 3.3->31 not consistent with adrenal insufficiency  Improved 7/20-7/21

## 2019-07-19 NOTE — PROGRESS NOTES
"Hospital Medicine Daily Progress Note    Date of Service  7/19/2019    Chief Complaint  High blood sugars and nausea vomiting    Hospital Course    28 y.o. female insulin-dependent diabetic admitted 7/17/2019 with diabetic ketoacidosis and nausea and vomiting.        Interval Problem Update  Oriented  Morphine for pain as needed  Sinus tach  Ongoing N/V  Afebrile  NPO  UOP:875cc overnight  Calling endocrine   Discussed with Dr Benz  Call to get Port with IR.  IV multivitamin    Consultants/Specialty  Intensivist    Code Status  Full    Disposition  Monitor in ICU while on insulin drip    Review of Systems  Review of Systems   Constitutional: Positive for malaise/fatigue. Negative for chills and fever.   HENT: Positive for sore throat (\"from vomiting\").    Respiratory: Negative for shortness of breath.    Cardiovascular: Negative for palpitations and leg swelling.   Gastrointestinal: Positive for abdominal pain, heartburn, nausea and vomiting.   Genitourinary: Negative for flank pain.   Musculoskeletal: Negative for back pain and neck pain.   Neurological: Negative for dizziness and headaches.   Psychiatric/Behavioral: Negative for depression. The patient is not nervous/anxious.         Physical Exam  Temp:  [36.5 °C (97.7 °F)-37.8 °C (100 °F)] 36.5 °C (97.7 °F)  Pulse:  [] 104  Resp:  [15-28] 15  SpO2:  [93 %-98 %] 97 %    Physical Exam   Constitutional: She is oriented to person, place, and time. She appears well-developed and well-nourished. No distress.   HENT:   Head: Normocephalic and atraumatic.   Nose: Nose normal.   Mouth/Throat: Oropharynx is clear and moist.   Eyes: Conjunctivae and EOM are normal. Right eye exhibits no discharge. Left eye exhibits no discharge. No scleral icterus.   Neck: No tracheal deviation present.   Cardiovascular: Normal rate, regular rhythm and normal heart sounds.    No murmur heard.  Pulses:       Radial pulses are 2+ on the right side, and 2+ on the left side.        " Dorsalis pedis pulses are 2+ on the right side, and 2+ on the left side.   Pulmonary/Chest: Effort normal and breath sounds normal. No respiratory distress. She has no wheezes.   Abdominal: Soft. Bowel sounds are normal. She exhibits no distension. There is no tenderness.   Musculoskeletal: She exhibits no edema.   Neurological: She is alert and oriented to person, place, and time. No cranial nerve deficit.   Skin: Skin is warm. She is not diaphoretic.   Psychiatric: She has a normal mood and affect. Her behavior is normal. Thought content normal.   Vitals reviewed.      Fluids    Intake/Output Summary (Last 24 hours) at 07/19/19 1705  Last data filed at 07/19/19 1420   Gross per 24 hour   Intake          4182.83 ml   Output             1980 ml   Net          2202.83 ml       Laboratory  Recent Labs      07/17/19   1905  07/18/19   0603  07/19/19   0300   WBC  14.9*  8.1  8.2   RBC  3.91*  3.72*  3.85*   HEMOGLOBIN  10.2*  9.9*  10.6*   HEMATOCRIT  33.5*  31.4*  31.9*   MCV  85.7  84.4  82.9   MCH  26.1*  26.6*  27.5   MCHC  30.4*  31.5*  33.2*   RDW  49.0  49.3  48.9   PLATELETCT  216  193  200   MPV  9.9  9.7  9.6     Recent Labs      07/19/19   0100  07/19/19   0745  07/19/19   1000   SODIUM  143  143  143   POTASSIUM  3.3*  3.2*  3.1*   CHLORIDE  113*  114*  113*   CO2  20  21  21   GLUCOSE  146*  108*  125*   BUN  5*  4*  4*   CREATININE  0.43*  0.40*  0.36*   CALCIUM  7.7*  7.5*  7.3*                   Imaging  DX-CHEST-LIMITED (1 VIEW)   Final Result      1.  New right IJV central line tip projects in satisfactory position. No pneumothorax.      2.  No pulmonary consolidation is identified.      IR-MIDLINE CATHETER INSERTION WO GUIDANCE > AGE 3   Final Result                  Ultrasound-guided midline placement performed by qualified nursing staff    as above.          IR-CVC PORT PLACEMENT > AGE 5    (Results Pending)        Assessment/Plan  * DKA, type 1, not at goal (CMS-HCC)- (present on admission)    Assessment & Plan    Severe diabetic ketoacidosis with glucose 435 and a bicarb of 5  Antiemetics for nausea/vomiting  7/19 unable to transition off diabetic ketoacidosis protocol secondary to patient not being able to eat.  She was initially placed on insulin 180 protocol however she went back in the DKA.  I have ordered for interventional radiology to place a port  Will contact endocrinology   Holding her outpatient glargine  Monitoring every hour Accu-Cheks and every 4 hours BMP still     Diabetes type I (CMS-HCC)- (present on admission)   Assessment & Plan    Normally on glargine 30 units twice a day and Humalog prior to meals  Patient has been on insulin drip for DKA protocol  We have been monitoring her BMPs every 4 hours and q. hourly Accu-Cheks  Adjusted to insulin 180 protocol with closure of anion gap improving acidosis and attempting a diet  Poorly controlled diabetes with a glycohemoglobin of 12.6 in the past 4 months     Hematemesis- (present on admission)   Assessment & Plan    Antiemetics  Reglan  Likely associated nausea vomiting  Hemoglobin stable     Hypokalemia- (present on admission)   Assessment & Plan    K scale  Monitor and replace     Vomiting   Assessment & Plan    Concern of gastroparesis versus cyclic vomiting.  Remains on Reglan and continuing use of as needed antiemetics  IV fluids  Seen approximately 1 year ago July 5, 2018 by Dr. Lior Child     Hypocalcemia- (present on admission)   Assessment & Plan    Calcium has improved status post IV calcium  Monitoring          VTE prophylaxis: Enoxaparin

## 2019-07-19 NOTE — PROGRESS NOTES
Dr. Benz notified per DKA protocol of patient blood sugar 99. Orders to decrease insulin drip by 1 unit/hr, down to 3 units/hr, and follow up in AM rounds. Will continue doing hourly BG checks.

## 2019-07-19 NOTE — CARE PLAN
Problem: Discharge Barriers/Planning  Goal: Patient's continuum of care needs will be met    Intervention: Collaborate with Transitional Care Team and Interdisciplinary Team to meet discharge needs  Per Dr. Benz, need endocrine consult, consider port placement, possible psych eval, and insulin pump      Problem: Pain Management  Goal: Pain level will decrease to patient's comfort goal    Intervention: Follow pain managment plan developed in collaboration with patient and Interdisciplinary Team  Morphine PRN for pain related to frequent coughing and vomiting/heaving

## 2019-07-19 NOTE — PROGRESS NOTES
Spoke with Dr King roberts patients lab with changes from this am after switching to 180 protocol. Per MD to go back to DKA protocol and replenish K, and to have lab add on mg and phos to lab. Per MD he will add orders  Called lab and they stated that they will add a mg and phos to BMP lab.

## 2019-07-20 NOTE — CARE PLAN
Problem: Safety  Goal: Will remain free from injury  Pt will remain free  From injury, bed in lowest position, side rails up, use of non skid foot wear, and enforced the use of call light.     Problem: Skin Integrity  Goal: Risk for impaired skin integrity will decrease    Intervention: Implement precautions to protect skin integrity in collaboration with the interdisciplinary team  Precautions in place to prevent skin injury, pillows in use for repositioning as well as routine two hour turning. Ambulation QD.

## 2019-07-20 NOTE — PROGRESS NOTES
"Hospital Medicine Daily Progress Note    Date of Service  7/20/2019    Chief Complaint  High blood sugars and nausea vomiting    Hospital Course    28 y.o. female insulin-dependent diabetic admitted 7/17/2019 with diabetic ketoacidosis and nausea and vomiting.        Interval Problem Update  Oriented x 4 but lethargic  Ongoing vomiting and protonix IV ordered  Replacing Mg      Consultants/Specialty  Intensivist    Code Status  Full    Disposition  Monitor in ICU while on insulin drip    Review of Systems  Review of Systems   Constitutional: Positive for malaise/fatigue.   HENT: Positive for sore throat (\"from vomiting\").    Respiratory: Negative for shortness of breath.    Cardiovascular: Negative for palpitations and leg swelling.   Gastrointestinal: Positive for nausea and vomiting. Negative for abdominal pain and heartburn.   Neurological: Negative for dizziness and headaches.   Psychiatric/Behavioral: Negative for depression. The patient is not nervous/anxious.         Physical Exam  Temp:  [36.5 °C (97.7 °F)-37.6 °C (99.7 °F)] 37.5 °C (99.5 °F)  Pulse:  [] 93  Resp:  [15-24] 16  SpO2:  [93 %-99 %] 97 %    Physical Exam   Constitutional: She is oriented to person, place, and time. She appears well-developed and well-nourished. No distress.   HENT:   Head: Normocephalic and atraumatic.   Nose: Nose normal.   Mouth/Throat: Oropharynx is clear and moist.   Eyes: Conjunctivae and EOM are normal. Right eye exhibits no discharge. Left eye exhibits no discharge. No scleral icterus.   Neck: No tracheal deviation present.   Cardiovascular: Normal rate, regular rhythm and normal heart sounds.    No murmur heard.  Pulses:       Radial pulses are 2+ on the right side, and 2+ on the left side.        Dorsalis pedis pulses are 2+ on the right side, and 2+ on the left side.   Pulmonary/Chest: Effort normal and breath sounds normal. No respiratory distress. She has no wheezes.   Abdominal: Soft. Bowel sounds are normal. " She exhibits no distension. There is no tenderness.   Musculoskeletal: She exhibits no edema.   Neurological: She is alert and oriented to person, place, and time. No cranial nerve deficit.   Skin: Skin is warm. She is not diaphoretic.   Psychiatric: She has a normal mood and affect. Her behavior is normal. Thought content normal.   Vitals reviewed.      Fluids    Intake/Output Summary (Last 24 hours) at 07/20/19 2221  Last data filed at 07/20/19 1800   Gross per 24 hour   Intake             3740 ml   Output             2280 ml   Net             1460 ml       Laboratory  Recent Labs      07/18/19   0603  07/19/19   0300   WBC  8.1  8.2   RBC  3.72*  3.85*   HEMOGLOBIN  9.9*  10.6*   HEMATOCRIT  31.4*  31.9*   MCV  84.4  82.9   MCH  26.6*  27.5   MCHC  31.5*  33.2*   RDW  49.3  48.9   PLATELETCT  193  200   MPV  9.7  9.6     Recent Labs      07/20/19   1119  07/20/19   1703  07/20/19   1935   SODIUM  143  135  138   POTASSIUM  3.9  3.3*  3.6   CHLORIDE  111  104  105   CO2  23  21  24   GLUCOSE  150*  164*  176*   BUN  3*  3*  <3*   CREATININE  0.41*  0.43*  0.39*   CALCIUM  7.8*  7.7*  7.5*     Recent Labs      07/20/19   0211   APTT  20.7*   INR  1.04               Imaging  DX-CHEST-LIMITED (1 VIEW)   Final Result      1.  New right IJV central line tip projects in satisfactory position. No pneumothorax.      2.  No pulmonary consolidation is identified.      IR-MIDLINE CATHETER INSERTION WO GUIDANCE > AGE 3   Final Result                  Ultrasound-guided midline placement performed by qualified nursing staff    as above.          IR-CVC PORT PLACEMENT > AGE 5    (Results Pending)        Assessment/Plan  * DKA, type 1, not at goal (CMS-HCC)- (present on admission)   Assessment & Plan    Severe diabetic ketoacidosis with glucose 435 and a bicarb of 5  Antiemetics for nausea/vomiting  7/19 unable to transition off diabetic ketoacidosis protocol secondary to patient not being able to eat.  She was initially placed  on insulin 180 protocol however she went back in the DKA.  I have ordered for interventional radiology to place a port  Saturday was difficult obtaining endocrine consult. May need to await Monday  Holding her outpatient glargine  Monitoring every hour Accu-Cheks and every 4 hours BMP still     Diabetes type I (CMS-HCC)- (present on admission)   Assessment & Plan    Holding home insulin.  Normally on glargine 30 units twice a day and Humalog prior to meals  Patient has been on insulin drip for DKA protocol  We have been monitoring her BMPs every 4 hours and q. hourly Accu-Cheks  Adjusted to insulin 180 protocol with closure of anion gap improving acidosis and attempting a diet  Poorly controlled diabetes with a glycohemoglobin of 12.6 in the past 4 months     Hematemesis- (present on admission)   Assessment & Plan    Antiemetics  Reglan  Likely associated nausea vomiting  Hemoglobin stable  IV fluids     Hypokalemia- (present on admission)   Assessment & Plan    K scale  Monitor and replace     Vomiting   Assessment & Plan    Concern of gastroparesis versus cyclic vomiting.  Remains on Reglan and continuing use of as needed antiemetics  IV fluids  Seen approximately 1 year ago July 5, 2018 by Dr. Lior Child  IR order placed for port placement come Monday     Hypophosphatemia- (present on admission)   Assessment & Plan    Monitor and replace     Hypocalcemia- (present on admission)   Assessment & Plan    Calcium has improved status post IV calcium  Monitoring          VTE prophylaxis: Enoxaparin

## 2019-07-20 NOTE — PROGRESS NOTES
Critical Care Progress Note    Date of admission  7/17/2019    Chief Complaint  28 y.o. female who presented 7/17/2019 with poorly controlled IDDM with last A1c 12.6 in 3/2019, reported history of gastroparesis however negative gastric emptying study 12/2018, migraines, numerous previous admissions for DKA with most recent from 7/8-12.  Patient indicates she was doing well upon discharge, however this morning woke up and noted her blood sugar to be quite elevated and had associated nausea and vomiting and generalized abdominal discomfort.  She took 30 units of insulin without significant improvement in her blood sugar and decided to present to ED.     Again patient had gastric emptying study 12/2018 which was WNL, EGD 6/2018 with pyloric dilation and no improvement in underlying symptoms, has previously been on Reglan/Zyprexa/erythromycin with some improvement, has seen GI many times in the past with last being 7/2018 with recommendation of follow-up in academic facility given negative studies.(Copied from original consult note)    Hospital Course  She arrived in the intensive care with persistent severe metabolic acidosis, tachycardia and hypotension requiring further aggressive volume resuscitation.    Interval Problem Update  Reviewed last 24 hour events:  Neuro: aox4 lethargic toradol morphine  HR: 100-120's  SBP: 140-160's  Tmax: afebrile  GI: npo, takes meds but vomiting last BM prior to arrival  UOP: 1.9l   Lines: right IJ midline insulin 3units   Resp: room air   Vte: lovenox  PPI/H2:prilosec -IV  Antibx: none  Mag replaced  On dKA protocol      Review of Systems  Review of Systems   Constitutional: Negative for chills and fever.   HENT: Negative for congestion, sinus pain and sore throat.    Eyes: Negative for blurred vision and double vision.   Respiratory: Negative for cough, hemoptysis and shortness of breath.    Cardiovascular: Negative for chest pain.   Gastrointestinal: Positive for abdominal pain,  nausea and vomiting.   Genitourinary: Negative.    Musculoskeletal: Negative for myalgias.   Neurological: Negative for tingling, tremors and headaches.   Psychiatric/Behavioral: Negative for depression and substance abuse. The patient is not nervous/anxious and does not have insomnia.    All other systems reviewed and are negative.       Vital Signs for last 24 hours   Temp:  [36.5 °C (97.7 °F)-37.6 °C (99.7 °F)] 37.5 °C (99.5 °F)  Pulse:  [103-127] 107  Resp:  [15-26] 15  SpO2:  [93 %-98 %] 97 %    Hemodynamic parameters for last 24 hours       Respiratory Information for the last 24 hours       Physical Exam   Physical Exam   Constitutional: She is oriented to person, place, and time.   Sleeping when I enter the room   HENT:   Mouth/Throat: Oropharynx is clear and moist.   Eyes: Pupils are equal, round, and reactive to light.   Neck: Neck supple.   Cardiovascular:   No murmur heard.  Tachycardic regular rhythm no murmurs gallops rubs   Pulmonary/Chest: No respiratory distress. She has no wheezes. She has no rales.   Abdominal: Soft. She exhibits no distension and no mass. There is no tenderness. There is no rebound and no guarding.   Musculoskeletal: She exhibits no edema.   Neurological: She is alert and oriented to person, place, and time. No cranial nerve deficit.   Skin: Skin is warm and dry.   Psychiatric: She has a normal mood and affect. Her behavior is normal.   Ana depression, substance abuse       Medications  Current Facility-Administered Medications   Medication Dose Route Frequency Provider Last Rate Last Dose   • dextrose 10% and 0.45% NaCl infusion   Intravenous Continuous Dennis Veliz M.D. 150 mL/hr at 07/20/19 1039     • MD ALERT-PHARMACY TO CONSULT FOR DKA MONITORING 1 Each  1 Each Other PRN Dennis Veliz M.D.       • potassium phosphates 30 mmol in  mL ivpb  30 mmol Intravenous Once PRN Dennis Veliz M.D.        Or   • sodium phosphate 30 mmol in 1/2  mL ivpb  30  mmol Intravenous Once PRN Dennis Veliz M.D.       • Adult DKA potassium(K+) replacement scale  1 Each Intravenous Q4HRS Dennis Veliz M.D.   1 Each at 07/20/19 1400   • insulin regular human (HUMULIN/NOVOLIN R) 62.5 Units in  mL Infusion for DKA  3 Units/hr Intravenous Continuous Dennis Veliz M.D. 12 mL/hr at 07/20/19 1423 3 Units/hr at 07/20/19 1423   • pantoprazole (PROTONIX) injection 40 mg  40 mg Intravenous DAILY Michael Rao D.O.   40 mg at 07/20/19 1306   • D5 LR with KCl 20 mEq infusion   Intravenous Continuous Hugo Benz M.D. 150 mL/hr at 07/20/19 0022     • thiamine (B-1) 100 mg in D5W 100 mL IVPB  100 mg Intravenous DAILY Hugo Benz M.D. 200 mL/hr at 07/20/19 0621 100 mg at 07/20/19 0621   • folic acid 1 mg in NS 50 mL IVPB  1 mg Intravenous Q24HRS Hugo Benz M.D. 100 mL/hr at 07/20/19 0621 1 mg at 07/20/19 0621   • multivitamin (THERAGRAN) tablet 1 Tab  1 Tab Oral DAILY Hugo Benz M.D.   1 Tab at 07/20/19 0542   • ketorolac (TORADOL) injection 15 mg  15 mg Intravenous Q6HRS PRN Hugo Benz M.D.       • senna-docusate (PERICOLACE or SENOKOT S) 8.6-50 MG per tablet 2 Tab  2 Tab Oral BID Lior Acuña M.D.   Stopped at 07/19/19 1800    And   • polyethylene glycol/lytes (MIRALAX) PACKET 1 Packet  1 Packet Oral QDAY PRN Lior Acuña M.D.        And   • magnesium hydroxide (MILK OF MAGNESIA) suspension 30 mL  30 mL Oral QDAY PRN Lior Acuña M.D.        And   • bisacodyl (DULCOLAX) suppository 10 mg  10 mg Rectal QDAY PRN Lior Acuña M.D.       • enoxaparin (LOVENOX) inj 40 mg  40 mg Subcutaneous DAILY Lior Acuña M.D.   40 mg at 07/19/19 1709   • acetaminophen (TYLENOL) tablet 650 mg  650 mg Oral Q6HRS PRN Lior Acuña M.D.   650 mg at 07/17/19 1556   • ondansetron (ZOFRAN) syringe/vial injection 4 mg  4 mg Intravenous Q4HRS PRN Lior Acuña M.D.   4 mg at 07/19/19 2118   • ondansetron (ZOFRAN ODT) dispertab 4 mg  4 mg Oral Q4HRS PRN Lior  LUMA Acuña M.D.       • prochlorperazine (COMPAZINE) injection 5-10 mg  5-10 mg Intravenous Q4HRS PRN Lior Acuña M.D.   10 mg at 07/19/19 2252   • metoclopramide (REGLAN) injection 10 mg  10 mg Intravenous Q6HRS Hugo Joiner M.D.   10 mg at 07/20/19 1110   • OLANZapine (ZYPREXA) tablet 5 mg  5 mg Oral Q EVENING Hugo Joiner M.D.   Stopped at 07/19/19 1800       Fluids    Intake/Output Summary (Last 24 hours) at 07/20/19 1532  Last data filed at 07/20/19 1215   Gross per 24 hour   Intake          3723.96 ml   Output             3120 ml   Net           603.96 ml       Laboratory          Recent Labs      07/19/19   0745   07/19/19   1700  07/20/19   0150  07/20/19   0432  07/20/19   0600  07/20/19   1119   SODIUM  143   < >  140  144  144  145  146*  143   POTASSIUM  3.2*   < >  3.3*  4.1  4.1  3.5*  3.6  3.9   CHLORIDE  114*   < >  109  113*  111  112  111  111   CO2  21   < >  18*  14*  17*  19*  18*  23   BUN  4*   < >  4*  4*  4*  4*  4*  3*   CREATININE  0.40*   < >  0.45*  0.54  0.54  0.45*  0.55  0.41*   MAGNESIUM  2.0   --    --   1.9   --   1.8   --    PHOSPHORUS  2.5   --   1.3*  3.0   --   3.3   --    CALCIUM  7.5*   < >  7.9*  7.8*  7.7*  7.8*  8.0*  7.8*    < > = values in this interval not displayed.     Recent Labs      07/17/19   1905   07/20/19   0432  07/20/19   0600  07/20/19   1119   ALTSGPT  24   --   29   --    --    ASTSGOT  19   --   34   --    --    ALKPHOSPHAT  103*   --   118*   --    --    TBILIRUBIN  0.2   --   0.4   --    --    GLUCOSE  143*   < >  245*  179*  187*  150*    < > = values in this interval not displayed.     Recent Labs      07/17/19   1905  07/18/19   0603  07/19/19   0300  07/20/19   0432   WBC  14.9*  8.1  8.2   --    NEUTSPOLYS  70.80   --   68.90   --    LYMPHOCYTES  15.10*   --   20.30*   --    MONOCYTES  11.00   --   10.10   --    EOSINOPHILS  0.10   --   0.20   --    BASOPHILS  0.30   --   0.10   --    ASTSGOT  19   --    --   34   ALTSGPT  24    --    --   29   ALKPHOSPHAT  103*   --    --   118*   TBILIRUBIN  0.2   --    --   0.4     Recent Labs      07/17/19   1905  07/18/19   0603  07/19/19   0300  07/20/19   0211   RBC  3.91*  3.72*  3.85*   --    HEMOGLOBIN  10.2*  9.9*  10.6*   --    HEMATOCRIT  33.5*  31.4*  31.9*   --    PLATELETCT  216  193  200   --    PROTHROMBTM   --    --    --   13.8   APTT   --    --    --   20.7*   INR   --    --    --   1.04       Imaging  X-Ray:  No film today    Assessment/Plan  * DKA, type 1, not at goal (CMS-HCC)- (present on admission)   Assessment & Plan    Has been between dka and insulin 180 protocol a couple times now.   Anion gap closed, however patient not tolerating oral diet  Maintain intravascular euvolemia  Monitor and replete electrolytes as clinically indicated    Will consult endocrine since poorly controlled ? If a insulin pump cannidate  Continue to monitor transition again off DKA protocol to 180 monitor closely for going back into dka.     D5LR w/ kcl  Will ask IR to place port for frequent hospitalization and management of patient -> today 7/20     Hematemesis- (present on admission)   Assessment & Plan    Gastroccult positive emesis on 7/17  Suspect esophagitis v.  Gastritis v.  Virgen-Babin (after days of retching)  No bright red blood, no evidence of active bleeding  Gastroenterology consultation is clinically indicated    Resolved          Hypokalemia- (present on admission)   Assessment & Plan    Replace to keep greater than 4  Check magnesium level     Vomiting   Assessment & Plan    Has chronic history of protracted vomiting  Continue sheduled reglan, compazine, zyprexa  Try to reduce narcotics  Gastric emptying study preformed 12/29/2018 183 minutes  Prior GI consultations have discussed possible j-tube and recommend [facility] Consultation  Replace vitamins Thiamine, folate, MVI       Pyloric stenosis- (present on admission)   Assessment & Plan    History of with previous EG 6/2018  with dilation  Did not improve repeated episodes of nausea/vomiting/abdominal pain     Hypophosphatemia- (present on admission)   Assessment & Plan    Actively monitor and replace to prevent insulin resistance     Hypocalcemia- (present on admission)   Assessment & Plan    Replace          VTE:  Lovenox  Ulcer: Not Indicated  Lines: Central Line  Ongoing indication addressed    I have performed a physical exam and reviewed and updated ROS and Plan today (7/20/2019). In review of yesterday's note (7/19/2019), there are no changes except as documented above.     Discussed patient condition and risk of morbidity and/or mortality with Hospitalist, RN, Pharmacy, Charge nurse / hot rounds and Patient    Patient remains in critical condition from DKA still with active titration of insulin gtt and monitor and replacing electrolytes. Critical care time provided was 37 minutes. This excludes all separate billable procedures.

## 2019-07-20 NOTE — ASSESSMENT & PLAN NOTE
Concern of gastroparesis versus cyclic vomiting.  Remains on Reglan and continuing use of as needed antiemetics  IV fluids  Seen approximately 1 year ago July 5, 2018 by Dr. Lior Child  If any recurrence of vomiting will consult GI  IR order placed for port placement come Monday

## 2019-07-20 NOTE — PROGRESS NOTES
"C/O 7/10 \"chest pain.\" Described as pressure.\" Patient states she experienced this pain earlier this morning, that the pain comes on when her BS level increases. Requested an ice pack to help with the pain. RN provided ice pack. Dr. Veliz notified of pain. No new orders at this time.   "

## 2019-07-20 NOTE — PROGRESS NOTES
Received bedside report assumed care of pt. Pt resting in bed. Bedside table, and call light within reach. Bed alarm on and in lowest position.  Drips verified. Pt denying pain at this time. VS stable. Updated whiteboard in room and discussed today's POC.

## 2019-07-21 PROBLEM — E87.6 HYPOKALEMIA: Status: RESOLVED | Noted: 2018-04-09 | Resolved: 2019-01-01

## 2019-07-21 NOTE — PROGRESS NOTES
Critical Care Progress Note    Date of admission  7/17/2019    Chief Complaint  28 y.o. female who presented 7/17/2019 with poorly controlled IDDM with last A1c 12.6 in 3/2019, reported history of gastroparesis however negative gastric emptying study 12/2018, migraines, numerous previous admissions for DKA with most recent from 7/8-12.  Patient indicates she was doing well upon discharge, however this morning woke up and noted her blood sugar to be quite elevated and had associated nausea and vomiting and generalized abdominal discomfort.  She took 30 units of insulin without significant improvement in her blood sugar and decided to present to ED.     Again patient had gastric emptying study 12/2018 which was WNL, EGD 6/2018 with pyloric dilation and no improvement in underlying symptoms, has previously been on Reglan/Zyprexa/erythromycin with some improvement, has seen GI many times in the past with last being 7/2018 with recommendation of follow-up in academic facility given negative studies.(Copied from original consult note)    Hospital Course  She arrived in the intensive care with persistent severe metabolic acidosis, tachycardia and hypotension requiring further aggressive volume resuscitation.    Interval Problem Update  Reviewed last 24 hour events:  No more vomiting after reglan and compazine  Neuro: aox4 moves all   HR: snr   SBP: 120  Tmax: afebrile  GI: npo on insulin gtt  UOP: good, gonzalez  Lines: central line  Resp: room air  Vte: lovenox  PPI/H2:protonix  Antibx: none  Phos, mag     Feeling much better has eaten some jello feeling the best she has   Transition off insulin drip watch chemistry closely and symptoms for need to restarting since has failed multiple times.   LR at 75ml/hr    Review of Systems  Review of Systems   Constitutional: Negative for chills and fever.   HENT: Negative for congestion, sinus pain and sore throat.    Eyes: Negative for blurred vision and double vision.   Respiratory:  Negative for cough, hemoptysis and shortness of breath.    Cardiovascular: Negative for chest pain.   Gastrointestinal: Positive for nausea. Negative for abdominal pain and vomiting.        Much improved nausea and abdominal pain   Genitourinary: Negative.    Musculoskeletal: Negative for myalgias.   Neurological: Negative for tingling, tremors and headaches.   Psychiatric/Behavioral: Negative for depression and substance abuse. The patient is not nervous/anxious and does not have insomnia.    All other systems reviewed and are negative.       Vital Signs for last 24 hours   Temp:  [37.5 °C (99.5 °F)-37.6 °C (99.7 °F)] 37.5 °C (99.5 °F)  Pulse:  [] 91  Resp:  [12-25] 19  SpO2:  [93 %-99 %] 98 %    Hemodynamic parameters for last 24 hours       Respiratory Information for the last 24 hours       Physical Exam   Physical Exam   Constitutional: She is oriented to person, place, and time. She appears well-developed and well-nourished.   Feeling better.    HENT:   Mouth/Throat: Oropharynx is clear and moist.   Right IJ catheter   Eyes: Pupils are equal, round, and reactive to light.   Neck: Neck supple.   Cardiovascular:   No murmur heard.  Tachycardic regular rhythm no murmurs gallops rubs   Pulmonary/Chest: No respiratory distress. She has no wheezes. She has no rales.   Abdominal: Soft. She exhibits no distension and no mass. There is no tenderness. There is no rebound and no guarding.   Musculoskeletal: She exhibits no edema.   Neurological: She is alert and oriented to person, place, and time. No cranial nerve deficit.   Skin: Skin is warm and dry.   Psychiatric: Her behavior is normal.   Ana depression, substance abuse, flat affect       Medications  Current Facility-Administered Medications   Medication Dose Route Frequency Provider Last Rate Last Dose   • magnesium sulfate IVPB premix 2 g  2 g Intravenous Once Hugo Benz M.D. 25 mL/hr at 07/21/19 1211 2 g at 07/21/19 1211   • potassium phosphates  30 mmol in  mL ivpb  30 mmol Intravenous Once Hugo Benz M.D. 100 mL/hr at 07/21/19 1108 30 mmol at 07/21/19 1108   • insulin glargine (LANTUS) injection 15 Units  15 Units Subcutaneous BID Hugo Benz M.D.       • insulin regular (HUMULIN R) injection 3-14 Units  3-14 Units Subcutaneous 4X/DAY ACHS Hugo Benz M.D.        And   • glucose 4 g chewable tablet 16 g  16 g Oral Q15 MIN PRN Hugo Benz M.D.        And   • DEXTROSE 10% BOLUS 250 mL  250 mL Intravenous Q15 MIN PRN Hugo Benz M.D.       • potassium chloride 20 mEq in LR 1,000 mL infusion   Intravenous Continuous Hugo Benz M.D.       • dextrose 10% and 0.45% NaCl infusion   Intravenous Continuous Hugo Benz M.D. 150 mL/hr at 07/21/19 1315     • insulin regular human (HUMULIN/NOVOLIN R) 62.5 Units in  mL Infusion for DKA  3 Units/hr Intravenous Continuous Hugo Benz M.D. 12 mL/hr at 07/21/19 1322 3 Units/hr at 07/21/19 1322   • pantoprazole (PROTONIX) injection 40 mg  40 mg Intravenous DAILY Michael Rao D.O.   40 mg at 07/21/19 0609   • folic acid 1 mg in NS 50 mL IVPB  1 mg Intravenous Q24HRS Hugo Benz M.D. 100 mL/hr at 07/21/19 0650 1 mg at 07/21/19 0650   • multivitamin (THERAGRAN) tablet 1 Tab  1 Tab Oral DAILY Hugo Benz M.D.   1 Tab at 07/20/19 0542   • ketorolac (TORADOL) injection 15 mg  15 mg Intravenous Q6HRS PRN Hugo Benz M.D.   15 mg at 07/20/19 1711   • senna-docusate (PERICOLACE or SENOKOT S) 8.6-50 MG per tablet 2 Tab  2 Tab Oral BID Lior Acuña M.D.   Stopped at 07/19/19 1800    And   • polyethylene glycol/lytes (MIRALAX) PACKET 1 Packet  1 Packet Oral QDAY PRN Lior Acuña M.D.        And   • magnesium hydroxide (MILK OF MAGNESIA) suspension 30 mL  30 mL Oral QDAY PRN Lior Acuña M.D.        And   • bisacodyl (DULCOLAX) suppository 10 mg  10 mg Rectal QDAY PRN Lior Acuña M.D.       • enoxaparin (LOVENOX) inj 40 mg  40 mg Subcutaneous DAILY  Lior Acuña M.D.   40 mg at 07/20/19 1654   • acetaminophen (TYLENOL) tablet 650 mg  650 mg Oral Q6HRS PRN Lior Acuña M.D.   650 mg at 07/17/19 1556   • ondansetron (ZOFRAN) syringe/vial injection 4 mg  4 mg Intravenous Q4HRS PRN Lior Acuña M.D.   4 mg at 07/19/19 2118   • ondansetron (ZOFRAN ODT) dispertab 4 mg  4 mg Oral Q4HRS PRN Lior Acuña M.D.       • prochlorperazine (COMPAZINE) injection 5-10 mg  5-10 mg Intravenous Q4HRS PRN Lior Acuña M.D.   10 mg at 07/21/19 0406   • metoclopramide (REGLAN) injection 10 mg  10 mg Intravenous Q6HRS Hugo Joiner M.D.   10 mg at 07/21/19 1108   • OLANZapine (ZYPREXA) tablet 5 mg  5 mg Oral Q EVENING Hugo Joiner M.D.   Stopped at 07/19/19 1800       Fluids    Intake/Output Summary (Last 24 hours) at 07/21/19 1341  Last data filed at 07/21/19 0645   Gross per 24 hour   Intake           3719.4 ml   Output             3300 ml   Net            419.4 ml       Laboratory          Recent Labs      07/20/19   0150   07/20/19   0600   07/20/19   1703   07/21/19   0000  07/21/19   0128  07/21/19   0400  07/21/19   0930   SODIUM  144   < >  145  146*   < >  135   < >  139  139   --   136   POTASSIUM  4.1   < >  3.5*  3.6   < >  3.3*   < >  3.6  3.8   --   4.1   CHLORIDE  113*   < >  112  111   < >  104   < >  107  107   --   106   CO2  14*   < >  19*  18*   < >  21   < >  20  24   --   22   BUN  4*   < >  4*  4*   < >  3*   < >  3*  3*   --   <3*   CREATININE  0.54   < >  0.45*  0.55   < >  0.43*   < >  0.42*  0.36*   --   0.41*   MAGNESIUM  1.9   --   1.8   --   2.2   --    --   2.0  1.9   --    PHOSPHORUS  3.0   --   3.3   --    --    --    --    --   1.3*   --    CALCIUM  7.8*   < >  7.8*  8.0*   < >  7.7*   < >  7.8*  7.5*   --   7.6*    < > = values in this interval not displayed.     Recent Labs      07/20/19   0432   07/21/19   0000  07/21/19   0128  07/21/19   0930   ALTSGPT  29   --    --    --    --    ASTSGOT  34   --    --    --    --     ALKPHOSPHAT  118*   --    --    --    --    TBILIRUBIN  0.4   --    --    --    --    GLUCOSE  245*   < >  145*  159*  196*    < > = values in this interval not displayed.     Recent Labs      07/19/19   0300  07/20/19   0432  07/21/19   0400   WBC  8.2   --   6.1   NEUTSPOLYS  68.90   --   48.40   LYMPHOCYTES  20.30*   --   41.20*   MONOCYTES  10.10   --   7.20   EOSINOPHILS  0.20   --   2.40   BASOPHILS  0.10   --   0.50   ASTSGOT   --   34   --    ALTSGPT   --   29   --    ALKPHOSPHAT   --   118*   --    TBILIRUBIN   --   0.4   --      Recent Labs      07/19/19   0300  07/20/19   0211  07/21/19   0400   RBC  3.85*   --   3.84*   HEMOGLOBIN  10.6*   --   10.2*   HEMATOCRIT  31.9*   --   32.0*   PLATELETCT  200   --   200   PROTHROMBTM   --   13.8   --    APTT   --   20.7*   --    INR   --   1.04   --        Imaging  X-Ray:  No film today    Assessment/Plan  * DKA, type 1, not at goal (CMS-HCC)- (present on admission)   Assessment & Plan    Has been between dka and insulin 180 protocol a couple times now.   Anion gap closed, however patient not tolerating oral diet  Maintain intravascular euvolemia  Monitor and replete electrolytes as clinically indicated    Really need endocrine assistance since poorly controlled spends most of the month in dka on gtt in hospital ? If a insulin pump candidate -> really feel this trajectory for this patient is the wrong course and need more assistance to maximize patient care and outcome    D5LR w/ kcl  Will ask IR to place port for frequent hospitalization and management of patient -> now pushed back to Monday    Will again try to transition today and monitor closely for return to dka  Seems like main drivers are nausea/vomiting leading to ketosis glycolysis and increase adrenergic state.      Hematemesis- (present on admission)   Assessment & Plan    Gastroccult positive emesis on 7/17  Suspect esophagitis v.  Gastritis v.  Virgen-Babin (after days of retching)  No bright red  blood, no evidence of active bleeding  Gastroenterology consultation is clinically indicated    Resolved          Hypokalemia- (present on admission)   Assessment & Plan    Replace to keep greater than 4  Check magnesium level     Vomiting   Assessment & Plan    Has chronic history of protracted vomiting  Continue sheduled reglan, compazine, zyprexa  Try to reduce narcotics  Gastric emptying study preformed 12/29/2018 183 minutes  Prior GI consultations have discussed possible j-tube and recommend [facility]  Replace vitamins Thiamine, folate, MVI    Cortisol 3.3->31 not consistent with adrenal insufficiency  Improved 7/20-7/21        Pyloric stenosis- (present on admission)   Assessment & Plan    History of with previous EG 6/2018 with dilation  Did not improve repeated episodes of nausea/vomiting/abdominal pain     Hypophosphatemia- (present on admission)   Assessment & Plan    Actively monitor and replace to prevent insulin resistance     Hypocalcemia- (present on admission)   Assessment & Plan    Replace          VTE:  Lovenox  Ulcer: Not Indicated  Lines: Central Line  Ongoing indication addressed    I have performed a physical exam and reviewed and updated ROS and Plan today (7/21/2019). In review of yesterday's note (7/20/2019), there are no changes except as documented above.     Discussed patient condition and risk of morbidity and/or mortality with Hospitalist, RN, Pharmacy, Charge nurse / hot rounds and Patient    Patient remains in critical condition with extremely difficult to control DKA/DM on insulin gtt with active titration and monitoring to sliding coverage. Critical care time provided was 41 minutes. This excludes all separate billable procedures.

## 2019-07-21 NOTE — PROGRESS NOTES
Hospital Medicine Daily Progress Note    Date of Service  7/21/2019    Chief Complaint  High blood sugars and nausea vomiting    Hospital Course    28 y.o. female insulin-dependent diabetic admitted 7/17/2019 with diabetic ketoacidosis and nausea and vomiting.        Interval Problem Update  Compazine and zofran given last night which helped per patient's report  Afebrile  Trial of fluids today patient is tolerating without any further vomiting  Transition to her home Lantus dose of DKA protocol  Patient's mother is at bedside & was given updates    Consultants/Specialty  Intensivist    Code Status  Full    Disposition  Monitor in ICU while on insulin drip    Review of Systems  Review of Systems   Constitutional: Positive for malaise/fatigue.   Respiratory: Negative for shortness of breath.    Cardiovascular: Negative for palpitations and leg swelling.   Gastrointestinal: Negative for abdominal pain, constipation, diarrhea, nausea and vomiting.   Genitourinary: Negative for dysuria and hematuria.   Musculoskeletal: Negative for back pain.   Neurological: Negative for dizziness and speech change.   Psychiatric/Behavioral: Negative for depression. The patient is not nervous/anxious.         Physical Exam  Temp:  [37.5 °C (99.5 °F)-37.6 °C (99.7 °F)] 37.5 °C (99.5 °F)  Pulse:  [] 98  Resp:  [12-25] 21  SpO2:  [93 %-99 %] 98 %    Physical Exam   Constitutional: She is oriented to person, place, and time. She appears well-developed and well-nourished. No distress.   HENT:   Head: Normocephalic and atraumatic.   Nose: Nose normal.   Mouth/Throat: Oropharynx is clear and moist.   Eyes: Conjunctivae and EOM are normal. Right eye exhibits no discharge. Left eye exhibits no discharge. No scleral icterus.   Neck: No tracheal deviation present.   Cardiovascular: Normal rate, regular rhythm and normal heart sounds.    No murmur heard.  Pulses:       Radial pulses are 2+ on the right side, and 2+ on the left side.         Dorsalis pedis pulses are 2+ on the right side, and 2+ on the left side.   Pulmonary/Chest: Effort normal and breath sounds normal. No respiratory distress. She has no wheezes.   Abdominal: Soft. Bowel sounds are normal. She exhibits no distension. There is no tenderness.   Musculoskeletal: She exhibits no edema.   Lymphadenopathy:     She has no cervical adenopathy.   Neurological: She is alert and oriented to person, place, and time. No cranial nerve deficit.   Skin: Skin is warm. She is not diaphoretic.   Psychiatric: She has a normal mood and affect. Her behavior is normal. Thought content normal.   Vitals reviewed.      Fluids    Intake/Output Summary (Last 24 hours) at 07/21/19 1614  Last data filed at 07/21/19 1556   Gross per 24 hour   Intake           4917.1 ml   Output             4450 ml   Net            467.1 ml       Laboratory  Recent Labs      07/19/19   0300  07/21/19   0400   WBC  8.2  6.1   RBC  3.85*  3.84*   HEMOGLOBIN  10.6*  10.2*   HEMATOCRIT  31.9*  32.0*   MCV  82.9  83.3   MCH  27.5  26.6*   MCHC  33.2*  31.9*   RDW  48.9  46.6   PLATELETCT  200  200   MPV  9.6  9.9     Recent Labs      07/21/19   0000  07/21/19   0128  07/21/19   0930   SODIUM  139  139  136   POTASSIUM  3.6  3.8  4.1   CHLORIDE  107  107  106   CO2  20  24  22   GLUCOSE  145*  159*  196*   BUN  3*  3*  <3*   CREATININE  0.42*  0.36*  0.41*   CALCIUM  7.8*  7.5*  7.6*     Recent Labs      07/20/19   0211   APTT  20.7*   INR  1.04               Imaging  DX-CHEST-LIMITED (1 VIEW)   Final Result      1.  New right IJV central line tip projects in satisfactory position. No pneumothorax.      2.  No pulmonary consolidation is identified.      IR-MIDLINE CATHETER INSERTION WO GUIDANCE > AGE 3   Final Result                  Ultrasound-guided midline placement performed by qualified nursing staff    as above.          IR-CVC PORT PLACEMENT > AGE 5    (Results Pending)        Assessment/Plan  * DKA, type 1, not at goal (CMS-HCC)-  (present on admission)   Assessment & Plan    Severe diabetic ketoacidosis with glucose 435 and a bicarb of 5  Antiemetics for nausea/vomiting  7/19 unable to transition off diabetic ketoacidosis protocol secondary to patient not being able to eat.  She was initially placed on insulin 180 protocol however she went back in the DKA.  I have ordered for interventional radiology to place a port Monday 7/22  Transition to outpatient glargine and start ADA diet 7/21  Monitoring  Accu-Cheks and cover with SSI     Diabetes type I (CMS-[facility])- (present on admission)   Assessment & Plan    Holding home insulin.  Normally on glargine 30 units twice a day and Humalog prior to meals  Patient has been on insulin drip for DKA protocol until 7/21. Transition to home lantus  Advance diet as tolerates  Poorly controlled diabetes with a glycohemoglobin of 12.6 in the past 4 months     Hematemesis- (present on admission)   Assessment & Plan    Antiemetics  Reglan  Likely associated nausea vomiting  Hemoglobin stable  IV fluids     Vomiting   Assessment & Plan    Concern of gastroparesis versus cyclic vomiting.  Remains on Reglan and continuing use of as needed antiemetics  IV fluids  Seen approximately 1 year ago July 5, 2018 by Dr. Lior Child  If any recurrence of vomiting will consult GI  IR order placed for port placement come Monday     Hypophosphatemia- (present on admission)   Assessment & Plan    Monitor and replace     Hypocalcemia- (present on admission)   Assessment & Plan    Calcium has improved status post IV calcium  Monitoring          VTE prophylaxis: Enoxaparin

## 2019-07-21 NOTE — CARE PLAN
Problem: Infection  Goal: Will remain free from infection  Outcome: PROGRESSING AS EXPECTED  Patient central line dressing changed because it was not adhering well. Site cleaned and sterile technique used. CDI     Problem: Metabolic:  Goal: Ability to maintain appropriate glucose levels will improve  Outcome: PROGRESSING AS EXPECTED  Patient is finally tolerating food and has had decrease in N/V. MD notified and we will be attempting to transition her to subq insulin and a diet today. Continuing to monitor blood sugars.

## 2019-07-22 PROBLEM — K92.0 HEMATEMESIS: Status: RESOLVED | Noted: 2019-01-01 | Resolved: 2019-01-01

## 2019-07-22 PROBLEM — E83.39 HYPOPHOSPHATEMIA: Status: RESOLVED | Noted: 2018-06-02 | Resolved: 2019-01-01

## 2019-07-22 PROBLEM — R11.10 VOMITING: Status: RESOLVED | Noted: 2019-01-01 | Resolved: 2019-01-01

## 2019-07-22 PROBLEM — E83.51 HYPOCALCEMIA: Status: RESOLVED | Noted: 2018-06-02 | Resolved: 2019-01-01

## 2019-07-22 NOTE — PROGRESS NOTES
IR RN NOTE  Pt consented by Dr. Lizarraga prior to procedure, all questions answered. Pt, RN, MD signed consent, consent placed in chart.     Tunneled port placement performed by Dr. Lizarraga, assisted by RT Remy.    Right upper chest and IJ access site    Bard PowerPort Implantable Port with Attachable 8 Fr 18.5 cm Polyurethane Open-Ended Single-Lumen Venous Catheter REF: [id number] LOT: [id number]    Access site dressed with sutures, dermabond, steristrip, biopatch, and tegaderm CDI, soft.     ETCO2 intraprocedure 30-34.    Pt tolerated procedure, report given to CAIT Adhikari. Pt transported to T624 and care transferred to receiving RN.

## 2019-07-22 NOTE — PROGRESS NOTES
Hospital Medicine Daily Progress Note    Date of Service  7/22/2019    Chief Complaint  High blood sugars and nausea vomiting    Hospital Course    28 y.o. female insulin-dependent diabetic admitted 7/17/2019 with diabetic ketoacidosis and nausea and vomiting.        Interval Problem Update  To get IR placed access port this am.  Sinus tachy with ambulation  Afebrile  Off insulin drip since yesterday  UOP:1300cc overnight  LR @ 75cc/hr    Consultants/Specialty  Intensivist    Code Status  Full    Disposition  Monitor in ICU while on insulin drip    Review of Systems  Review of Systems   Constitutional: Positive for malaise/fatigue.   Respiratory: Negative for shortness of breath.    Cardiovascular: Negative for palpitations and leg swelling.   Gastrointestinal: Negative for abdominal pain, constipation, diarrhea, nausea and vomiting.   Genitourinary: Negative for dysuria and hematuria.   Musculoskeletal: Negative for back pain.   Neurological: Negative for dizziness and speech change.   Psychiatric/Behavioral: Negative for depression. The patient is not nervous/anxious.         Physical Exam  Temp:  [36.5 °C (97.7 °F)] 36.5 °C (97.7 °F)  Pulse:  [] 114  Resp:  [15-25] 22  SpO2:  [93 %-98 %] 96 %    Physical Exam   Constitutional: She is oriented to person, place, and time. She appears well-developed and well-nourished. No distress.   HENT:   Head: Normocephalic and atraumatic.   Nose: Nose normal.   Mouth/Throat: Oropharynx is clear and moist.   Eyes: Conjunctivae and EOM are normal. Right eye exhibits no discharge. Left eye exhibits no discharge. No scleral icterus.   Neck: No tracheal deviation present.   Cardiovascular: Normal rate, regular rhythm and normal heart sounds.    No murmur heard.  Pulses:       Radial pulses are 2+ on the right side, and 2+ on the left side.        Dorsalis pedis pulses are 2+ on the right side, and 2+ on the left side.   Pulmonary/Chest: Effort normal and breath sounds normal.  No respiratory distress. She has no wheezes.   Abdominal: Soft. Bowel sounds are normal. She exhibits no distension. There is no tenderness.   Musculoskeletal: She exhibits no edema.   Lymphadenopathy:     She has no cervical adenopathy.   Neurological: She is alert and oriented to person, place, and time. No cranial nerve deficit.   Skin: Skin is warm. She is not diaphoretic.   Psychiatric: She has a normal mood and affect. Her behavior is normal. Thought content normal.   Vitals reviewed.      Fluids    Intake/Output Summary (Last 24 hours) at 07/22/19 0713  Last data filed at 07/22/19 0506   Gross per 24 hour   Intake           2387.7 ml   Output             3150 ml   Net           -762.3 ml       Laboratory  Recent Labs      07/21/19   0400  07/22/19   0510   WBC  6.1  5.4   RBC  3.84*  4.10*   HEMOGLOBIN  10.2*  10.8*   HEMATOCRIT  32.0*  34.4*   MCV  83.3  83.9   MCH  26.6*  26.3*   MCHC  31.9*  31.4*   RDW  46.6  46.6   PLATELETCT  200  196   MPV  9.9  9.9     Recent Labs      07/21/19   0930  07/21/19   1815  07/22/19   0510   SODIUM  136  134*  135   POTASSIUM  4.1  3.8  4.4   CHLORIDE  106  104  104   CO2  22  21  23   GLUCOSE  196*  380*  382*   BUN  <3*  4*  5*   CREATININE  0.41*  0.48*  0.56   CALCIUM  7.6*  7.7*  7.9*     Recent Labs      07/20/19   0211   APTT  20.7*   INR  1.04               Imaging  DX-CHEST-LIMITED (1 VIEW)   Final Result      1.  New right IJV central line tip projects in satisfactory position. No pneumothorax.      2.  No pulmonary consolidation is identified.      IR-MIDLINE CATHETER INSERTION WO GUIDANCE > AGE 3   Final Result                  Ultrasound-guided midline placement performed by qualified nursing staff    as above.          IR-CVC PORT PLACEMENT > AGE 5    (Results Pending)        Assessment/Plan  * DKA, type 1, not at goal (CMS-HCC)- (present on admission)   Assessment & Plan    Severe diabetic ketoacidosis with glucose 435 and a bicarb of 5  Antiemetics for  nausea/vomiting  7/19 unable to transition off diabetic ketoacidosis protocol secondary to patient not being able to eat.  She was initially placed on insulin 180 protocol however she went back in the DKA.  I have ordered for interventional radiology to place a port Monday 7/22  Transition to outpatient glargine and start ADA diet 7/21  Monitoring  Accu-Cheks and cover with SSI     Diabetes type I (CMS-HCC)- (present on admission)   Assessment & Plan    Holding home insulin.  Normally on glargine 30 units twice a day and Humalog prior to meals  Patient has been on insulin drip for DKA protocol until 7/21. Transition to home lantus  Advance diet as tolerates  Poorly controlled diabetes with a glycohemoglobin of 12.6 in the past 4 months     Hematemesis- (present on admission)   Assessment & Plan    Antiemetics  Reglan  Likely associated nausea vomiting  Hemoglobin stable  IV fluids     Vomiting   Assessment & Plan    Concern of gastroparesis versus cyclic vomiting.  Remains on Reglan and continuing use of as needed antiemetics  IV fluids  Seen approximately 1 year ago July 5, 2018 by Dr. Lior Child  If any recurrence of vomiting will consult GI  IR order placed for port placement come Monday     Hypophosphatemia- (present on admission)   Assessment & Plan    Monitor and replace     Hypocalcemia- (present on admission)   Assessment & Plan    Calcium has improved status post IV calcium  Monitoring          VTE prophylaxis: Enoxaparin

## 2019-07-22 NOTE — DISCHARGE INSTRUCTIONS
Discharge Instructions    Discharged to home by car with relative. Discharged via wheelchair, hospital escort: Yes.  Special equipment needed: Not Applicable    Be sure to schedule a follow-up appointment with your primary care doctor or any specialists as instructed.     Discharge Plan:   Diet Plan: Discussed  Activity Level: Discussed  Confirmed Follow up Appointment: Appointment Scheduled  Confirmed Symptoms Management: Discussed  Medication Reconciliation Updated: Yes  Influenza Vaccine Indication: Patient Refuses    I understand that a diet low in cholesterol, fat, and sodium is recommended for good health. Unless I have been given specific instructions below for another diet, I accept this instruction as my diet prescription.   Other diet: Diabetic    Special Instructions: None    · Is patient discharged on Warfarin / Coumadin?   No     Depression / Suicide Risk    As you are discharged from this [facility] facility, it is important to learn how to keep safe from harming yourself.    Recognize the warning signs:  · Abrupt changes in personality, positive or negative- including increase in energy   · Giving away possessions  · Change in eating patterns- significant weight changes-  positive or negative  · Change in sleeping patterns- unable to sleep or sleeping all the time   · Unwillingness or inability to communicate  · Depression  · Unusual sadness, discouragement and loneliness  · Talk of wanting to die  · Neglect of personal appearance   · Rebelliousness- reckless behavior  · Withdrawal from people/activities they love  · Confusion- inability to concentrate     If you or a loved one observes any of these behaviors or has concerns about self-harm, here's what you can do:  · Talk about it- your feelings and reasons for harming yourself  · Remove any means that you might use to hurt yourself (examples: pills, rope, extension cords, firearm)  · Get professional help from the community (Mental Health,  Substance Abuse, psychological counseling)  · Do not be alone:Call your Safe Contact- someone whom you trust who will be there for you.  · Call your local CRISIS HOTLINE 145-7516 or 997-104-8007  · Call your local Children's Mobile Crisis Response Team [place] (782) 951-6188 or www.MailInBlack  · Call the toll free National Suicide Prevention Hotlines   · National Suicide Prevention Lifeline 654-205-YGFO (4460)  · [employer] Hope Line Network 800-SUICIDE (916-2959)      Diabetic Ketoacidosis  Diabetic ketoacidosis is a life-threatening complication of diabetes. If it is not treated, it can cause severe dehydration and organ damage and can lead to a coma or death.  What are the causes?  This condition develops when there is not enough of the hormone insulin in the body. Insulin helps the body to break down sugar for energy. Without insulin, the body cannot break down sugar, so it breaks down fats instead. This leads to the production of acids that are called ketones. Ketones are poisonous at high levels.  This condition can be triggered by:  · Stress on the body that is brought on by an illness.  · Medicines that raise blood glucose levels.  · Not taking diabetes medicine.  What are the signs or symptoms?  Symptoms of this condition include:  · Fatigue.  · Weight loss.  · Excessive thirst.  · Light-headedness.  · Fruity or sweet-smelling breath.  · Excessive urination.  · Vision changes.  · Confusion or irritability.  · Nausea.  · Vomiting.  · Rapid breathing.  · Abdominal pain.  · Feeling flushed.  How is this diagnosed?  This condition is diagnosed based on a medical history, a physical exam, and blood tests. You may also have a urine test that checks for ketones.  How is this treated?  This condition may be treated with:  · Fluid replacement. This may be done to correct dehydration.  · Insulin injections. These may be given through the skin or through an IV tube.  · Electrolyte replacement. Electrolytes,  such as potassium and sodium, may be given in pill form or through an IV tube.  · Antibiotic medicines. These may be prescribed if your condition was caused by an infection.  Follow these instructions at home:  Eating and drinking  · Drink enough fluids to keep your urine clear or pale yellow.  · If you cannot eat, alternate between drinking fluids with sugar (such as juice) and salty fluids (such as broth or bouillon).  · If you can eat, follow your usual diet and drink sugar-free liquids, such as water.  Other Instructions   · Take insulin as directed by your health care provider. Do not skip insulin injections. Do not use  insulin.  · If your blood sugar is over 240 mg/dL, monitor your urine ketones every 4-6 hours.  · If you were prescribed an antibiotic medicine, finish all of it even if you start to feel better.  · Rest and exercise only as directed by your health care provider.  · If you get sick, call your health care provider and begin treatment quickly. Your body often needs extra insulin to fight an illness.  · Check your blood glucose levels regularly. If your blood glucose is high, drink plenty of fluids. This helps to flush out ketones.  Contact a health care provider if:  · Your blood glucose level is too high or too low.  · You have ketones in your urine.  · You have a fever.  · You cannot eat.  · You cannot tolerate fluids.  · You have been vomiting for more than 2 hours.  · You continue to have symptoms of this condition.  · You develop new symptoms.  Get help right away if:  · Your blood glucose levels continue to be high (elevated).  · Your monitor reads “high” even when you are taking insulin.  · You faint.  · You have chest pain.  · You have trouble breathing.  · You have a sudden, severe headache.  · You have sudden weakness in one arm or one leg.  · You have sudden trouble speaking or swallowing.  · You have vomiting or diarrhea that gets worse after 3 hours.  · You feel severely  fatigued.  · You have trouble thinking.  · You have abdominal pain.  · You are severely dehydrated. Symptoms of severe dehydration include:  ¨ Extreme thirst.  ¨ Dry mouth.  ¨ Blue lips.  ¨ Cold hands and feet.  ¨ Rapid breathing.  This information is not intended to replace advice given to you by your health care provider. Make sure you discuss any questions you have with your health care provider.  Document Released: 12/15/2001 Document Revised: 05/25/2017 Document Reviewed: 11/25/2015  [employer] Interactive Patient Education © 2017 [employer]      Ondansetron oral dissolving tablet  What is this medicine?  ONDANSETRON (on DAN se reyes) is used to treat nausea and vomiting caused by chemotherapy. It is also used to prevent or treat nausea and vomiting after surgery.  This medicine may be used for other purposes; ask your health care provider or pharmacist if you have questions.  COMMON BRAND NAME(S): Zofran ODT  What should I tell my health care provider before I take this medicine?  They need to know if you have any of these conditions:  -heart disease  -history of irregular heartbeat  -liver disease  -low levels of magnesium or potassium in the blood  -an unusual or allergic reaction to ondansetron, granisetron, other medicines, foods, dyes, or preservatives  -pregnant or trying to get pregnant  -breast-feeding  How should I use this medicine?  These tablets are made to dissolve in the mouth. Do not try to push the tablet through the foil backing. With dry hands, peel away the foil backing and gently remove the tablet. Place the tablet in the mouth and allow it to dissolve, then swallow. While you may take these tablets with water, it is not necessary to do so.  Talk to your pediatrician regarding the use of this medicine in children. Special care may be needed.  Overdosage: If you think you have taken too much of this medicine contact a poison control center or emergency room at once.  NOTE: This medicine is  only for you. Do not share this medicine with others.  What if I miss a dose?  If you miss a dose, take it as soon as you can. If it is almost time for your next dose, take only that dose. Do not take double or extra doses.  What may interact with this medicine?  Do not take this medicine with any of the following medications:  -apomorphine  -certain medicines for fungal infections like fluconazole, itraconazole, ketoconazole, posaconazole, voriconazole  -cisapride  -dofetilide  -dronedarone  -pimozide  -thioridazine  -ziprasidone  This medicine may also interact with the following medications:  -carbamazepine  -certain medicines for depression, anxiety, or psychotic disturbances  -fentanyl  -linezolid  -MAOIs like Carbex, Eldepryl, Marplan, Nardil, and Parnate  -methylene blue (injected into a vein)  -other medicines that prolong the QT interval (cause an abnormal heart rhythm)  -phenytoin  -rifampicin  -tramadol  This list may not describe all possible interactions. Give your health care provider a list of all the medicines, herbs, non-prescription drugs, or dietary supplements you use. Also tell them if you smoke, drink alcohol, or use illegal drugs. Some items may interact with your medicine.  What should I watch for while using this medicine?  Check with your doctor or health care professional as soon as you can if you have any sign of an allergic reaction.  What side effects may I notice from receiving this medicine?  Side effects that you should report to your doctor or health care professional as soon as possible:  -allergic reactions like skin rash, itching or hives, swelling of the face, lips, or tongue  -breathing problems  -confusion  -dizziness  -fast or irregular heartbeat  -feeling faint or lightheaded, falls  -fever and chills  -loss of balance or coordination  -seizures  -sweating  -swelling of the hands and feet  -tightness in the chest  -tremors  -unusually weak or tired  Side effects that usually  do not require medical attention (report to your doctor or health care professional if they continue or are bothersome):  -constipation or diarrhea  -headache  This list may not describe all possible side effects. Call your doctor for medical advice about side effects. You may report side effects to FDA at 5-495-HDG-6195.  Where should I keep my medicine?  Keep out of the reach of children.  Store between 2 and 30 degrees C (36 and 86 degrees F). Throw away any unused medicine after the expiration date.  NOTE: This sheet is a summary. It may not cover all possible information. If you have questions about this medicine, talk to your doctor, pharmacist, or health care provider.  © 2018 [employer] (2014-09-24 16:21:52)

## 2019-07-22 NOTE — PROGRESS NOTES
Received report from CAIT Coughlin. Assumed care at 1900. At this pt is AAOx4. Denies pain. Noted one episode of emeisis about 450 ml.  Labs noted, needs have been met, assessment complete. Bed in locked and low position, Call light in reach.  POC is Awaiting voiding at 2130, monitor blood sugars. BMP at 0000. Will continue to monitor pt progress. Drips verified.

## 2019-07-22 NOTE — OR SURGEON
Immediate Post- Operative Note    PostOp Diagnosis: VENOUS ACCESS REQUIRED FOR RECURRENT DKA, DIFFICULT PERIPHERAL VENOUS ACCESS      Procedure(s): RIGHT INTERNAL JUGULAR POWER PORT VENOUS ACCESS PLACEMENT WITH U/S AND FLUOROSCOPIC GUIDANCE    REMOVAL OF TEMP RIGHT INTERNAL JUGULAR TRIPLE LUMEN CVP CATHETER UNDER FLUORO MONITORING      Estimated Blood Loss: <5CC        Complications: NONE          7/22/2019     9:35 AM     Oscar Lizarraga

## 2019-07-22 NOTE — DISCHARGE SUMMARY
Discharge Summary    CHIEF COMPLAINT ON ADMISSION  Chief Complaint   Patient presents with   • High Blood Sugar       Reason for Admission  Nausea vomiting    Admission Date  7/17/2019    CODE STATUS  Full Code    HPI & HOSPITAL COURSE    This is a 28 y.o. female insulin-dependent diabetic with recurrent episodes of diabetic ketoacidosis secondary to recurrence of nausea and vomiting admitted 7/17/2019 with diabetic ketoacidosis.  She had quick resolve of her diabetic ketoacidosis however she had ongoing nausea and vomiting which was protracted.  She was maintained on a DKA protocol.  Eventually she was able to tolerate a diet.  Due to recurrent episodes of diabetic ketoacidosis and poor access a port was placed on 7/22.  On date of discharge patient was tolerating diet.  Patient denies any marijuana use.  Patient is being discharged she will follow-up with her primary care in the next 1 to 2 weeks.       Therefore, she is discharged in good and stable condition to home with close outpatient follow-up.    The patient met 2-midnight criteria for an inpatient stay at the time of discharge.    Discharge Date  7/22/2019    FOLLOW UP ITEMS POST DISCHARGE  None    DISCHARGE DIAGNOSES  Principal Problem (Resolved):    DKA, type 1, not at goal (CMS-HCC) POA: Yes  Active Problems:    Diabetes type I ([facility]) POA: Yes    Pyloric stenosis (Chronic) POA: Yes  Resolved Problems:    Hypokalemia POA: Yes    Hematemesis POA: Yes    Hypocalcemia POA: Yes    Hypophosphatemia POA: Yes    CHERELLE (acute kidney injury) (HCC) POA: Yes    Vomiting POA: Unknown      FOLLOW UP  With her primary care doctor in the next 1 to 2 weeks    MEDICATIONS ON DISCHARGE     Medication List      START taking these medications      Instructions   omeprazole 20 MG delayed-release capsule  Commonly known as:  PRILOSEC   Take 1 Cap by mouth every day.  Dose:  20 mg     ondansetron 4 MG Tbdp  Commonly known as:  ZOFRAN ODT   Take 1 Tab by mouth every four hours  as needed for Nausea (give PO if no IV route available).  Dose:  4 mg        CONTINUE taking these medications      Instructions   ADMELOG 100 UNIT/ML Soln  Generic drug:  insulin lispro   Inject 10-18 Units as instructed 3 times a day before meals. Sliding Scale Pt cannot define sliding scale  Dose:  10-18 Units     BASAGLAR KWIKPEN 100 UNIT/ML Sopn injection  Generic drug:  insulin glargine   Inject 30 Units as instructed 2 Times a Day.  Dose:  30 Units     SUMAtriptan 50 MG Tabs  Commonly known as:  IMITREX   Take 50 mg by mouth Once PRN for Migraine. Can repeat in 2 hrs if needed  Dose:  50 mg            Allergies  Allergies   Allergen Reactions   • Pcn [Penicillins] Shortness of Breath and Swelling     Per patient's Mom, patient tolerates Keflex   • Lisinopril Unspecified     Per historical: Reported pedal swelling. No facial/angioedema or rash nor respiratory symptoms.    • Promethazine Vomiting       DIET  Orders Placed This Encounter   Procedures   • Diet Order Diabetic     Standing Status:   Standing     Number of Occurrences:   1     Order Specific Question:   Diet:     Answer:   Diabetic [3]       ACTIVITY  As tolerated.  Weight bearing as tolerated    CONSULTATIONS  Intensivist    PROCEDURES  7/22 a right internal jugular PowerPort venous access placement via ultrasound by Dr. Luca Lizarraga    LABORATORY  Lab Results   Component Value Date    SODIUM 135 07/22/2019    POTASSIUM 4.4 07/22/2019    CHLORIDE 104 07/22/2019    CO2 23 07/22/2019    GLUCOSE 382 (H) 07/22/2019    BUN 5 (L) 07/22/2019    CREATININE 0.56 07/22/2019        Lab Results   Component Value Date    WBC 5.4 07/22/2019    HEMOGLOBIN 10.8 (L) 07/22/2019    HEMATOCRIT 34.4 (L) 07/22/2019    PLATELETCT 196 07/22/2019        Total time of the discharge process exceeds 31 minutes.

## 2019-07-22 NOTE — CARE PLAN
Problem: Discharge Barriers/Planning  Goal: Patient's continuum of care needs will be met  Outcome: PROGRESSING AS EXPECTED  Medical port to be placed.     Problem: Metabolic:  Goal: Diagnostic test results will improve  Electrolytes replaced this shift.

## 2019-07-23 NOTE — PROGRESS NOTES
Pt discharged to parking in wheelchair, accompanied by parent and CIC tech with belongings by side. Patient given last dose of 15 units lantus per MD orders.  Pt and parent both present for AVS discharge instructions.  Covered/explained all information provided in AVS discharge packet.  No further questions.

## 2019-07-27 NOTE — ASSESSMENT & PLAN NOTE
Goal blood glucose 120-180  Lantus 20 units nightly, sliding scale insulin, accuchecks  hypoglycemia protocol  A1c 12.6 4 months ago

## 2019-07-27 NOTE — ASSESSMENT & PLAN NOTE
Antiemetics as necessary  Reglan, Compazine, Zofran, Benadryl, Haldol, Capsaicin all available if needed  Avoid narcotics as much as possible

## 2019-07-27 NOTE — ED NOTES
Bedside report given to ICU nurses.  Pt continues to be very tachypneic kussmaul breathing at this time.  Arousable.  States pain and nausea under control.  Pt transported to floor with all monitors and nurses at this time.

## 2019-07-27 NOTE — ED NOTES
Multiple attempts made to get PIV access and to access the port. Labs drawn, labeled and sent to lab but unable to obtain access at this time. MD aware.

## 2019-07-27 NOTE — ED PROVIDER NOTES
ED Provider Note    Scribed for Jeb Torres M.D. by Ramirez Hodges. 7/27/2019  5:39 AM    Primary care provider: Bruna Laboy M.D.  Means of arrival: Walk In  History obtained from: Patient, Family  History limited by: None    CHIEF COMPLAINT  Chief Complaint   Patient presents with   • Hyperglycemia     >600 FSBS in triage   • Vomiting     x2 hours        HPI  Alis Sparks is a 28 y.o. Female with a history of type I diabetes who presents to the Emergency Department for evaluation of hyperglycemia and vomiting. She states that when she woke up this morning her fasting blood sugar was in the range of the 300's and she quickly started vomiting. She denies missing any insulin dosages that may have caused her to enter likely DKA. Upon arrival fasting blood sugars were greater than 600 and she is now complaining of some mild abdominal pain. She denies any chest pain.    REVIEW OF SYSTEMS  Pertinent positives include hyperglycemia, vomiting, abdominal pain.   Pertinent negatives include no chest pain.    All other systems reviewed and negative. See HPI for further details.     PAST MEDICAL HISTORY   has a past medical history of Backpain; Bronchitis; Diabetes type 1, controlled (HCC); DKA (diabetic ketoacidoses) (HCC); Fall; Gastroparesis; Kidney infection; Pneumonia; and UTI (urinary tract infection).    SURGICAL HISTORY   has a past surgical history that includes gastroscopy-endo (9/18/2014); gastroscopy with biopsy (9/18/2014); other; submandible abscess incision and drainage (Left, 11/8/2017); dental extraction(s) (11/8/2017); and gastroscopy-endo (N/A, 6/9/2018).    SOCIAL HISTORY  Social History   Substance Use Topics   • Smoking status: Never Smoker   • Smokeless tobacco: Never Used   • Alcohol use No      History   Drug Use No       FAMILY HISTORY  Family History   Problem Relation Age of Onset   • Cancer Unknown         breasts, multiple family members   • Hypertension Maternal Grandfather   "      CURRENT MEDICATIONS  Home Medications     Reviewed by Evin Romero R.N. (Registered Nurse) on 07/27/19 at 0502  Med List Status: Partial   Medication Last Dose Status   Insulin Glargine (BASAGLAR KWIKPEN) 100 UNIT/ML Solution Pen-injector  Active   insulin lispro (ADMELOG) 100 UNIT/ML Solution  Active   metoclopramide (REGLAN) 10 MG Tab  Active   omeprazole (PRILOSEC) 20 MG delayed-release capsule  Active   ondansetron (ZOFRAN ODT) 4 MG TABLET DISPERSIBLE  Active   SUMAtriptan (IMITREX) 50 MG Tab  Active                ALLERGIES  Allergies   Allergen Reactions   • Pcn [Penicillins] Shortness of Breath and Swelling     Per patient's Mom, patient tolerates Keflex   • Lisinopril Unspecified     Per historical: Reported pedal swelling. No facial/angioedema or rash nor respiratory symptoms.    • Promethazine Vomiting       PHYSICAL EXAM  VITAL SIGNS: /72   Pulse (!) 140   Temp 35.9 °C (96.7 °F) (Temporal)   Resp 18   Ht 1.651 m (5' 5\")   Wt 74.3 kg (163 lb 12.8 oz)   LMP 07/17/2019   SpO2 97%   BMI 27.26 kg/m²     Nursing note and vitals reviewed.  Constitutional: Well-developed, Ill-appearing young female  HENT: Head is normocephalic and atraumatic. Oropharynx is clear without exudate or erythema. Dry mucous membranes.   Eyes: Pupils are equal, round, and reactive to light. Conjunctiva are normal.   Cardiovascular: Tachycardic rate and regular rhythm. No murmur heard. Normal radial pulses.  Pulmonary/Chest: Cousmal breathing. No wheezes or rales. Port site and right upper chest wall is benign.  Abdominal: Soft and non-tender. No distention.  No peritoneal signs.  Active bowel sounds.    Musculoskeletal: Extremities exhibit normal range of motion without edema or tenderness.   Neurological: Awake, alert and oriented to person, place, and time. No focal deficits noted.  Skin: Skin is warm and dry. No rash.   Psychiatric: Appropriate for clinical situation.    DIAGNOSTIC STUDIES / PROCEDURES    EKG " Interpretation  Interpreted by me as below    LABS  Results for orders placed or performed during the hospital encounter of 07/27/19   CBC WITH DIFFERENTIAL   Result Value Ref Range    WBC 18.1 (H) 4.8 - 10.8 K/uL    RBC 4.43 4.20 - 5.40 M/uL    Hemoglobin 12.1 12.0 - 16.0 g/dL    Hematocrit 41.1 37.0 - 47.0 %    MCV 92.8 81.4 - 97.8 fL    MCH 27.3 27.0 - 33.0 pg    MCHC 29.4 (L) 33.6 - 35.0 g/dL    RDW 52.2 (H) 35.9 - 50.0 fL    Platelet Count 364 164 - 446 K/uL    MPV 11.0 9.0 - 12.9 fL    Neutrophils-Polys 64.90 44.00 - 72.00 %    Lymphocytes 19.80 (L) 22.00 - 41.00 %    Monocytes 7.20 0.00 - 13.40 %    Eosinophils 0.90 0.00 - 6.90 %    Basophils 3.60 (H) 0.00 - 1.80 %    Nucleated RBC 0.00 /100 WBC    Neutrophils (Absolute) 11.91 (H) 2.00 - 7.15 K/uL    Lymphs (Absolute) 3.58 1.00 - 4.80 K/uL    Monos (Absolute) 1.30 (H) 0.00 - 0.85 K/uL    Eos (Absolute) 0.16 0.00 - 0.51 K/uL    Baso (Absolute) 0.65 (H) 0.00 - 0.12 K/uL    NRBC (Absolute) 0.00 K/uL    Anisocytosis 1+     Microcytosis 1+    COMP METABOLIC PANEL   Result Value Ref Range    Sodium 128 (L) 135 - 145 mmol/L    Potassium 5.8 (H) 3.6 - 5.5 mmol/L    Chloride 92 (L) 96 - 112 mmol/L    Co2 5 (LL) 20 - 33 mmol/L    Anion Gap 31.0 (H) 0.0 - 11.9    Glucose 944 (HH) 65 - 99 mg/dL    Bun 20 8 - 22 mg/dL    Creatinine 1.01 0.50 - 1.40 mg/dL    Calcium 9.0 8.5 - 10.5 mg/dL    AST(SGOT) 26 12 - 45 U/L    ALT(SGPT) 26 2 - 50 U/L    Alkaline Phosphatase 184 (H) 30 - 99 U/L    Total Bilirubin 0.5 0.1 - 1.5 mg/dL    Albumin 4.3 3.2 - 4.9 g/dL    Total Protein 7.3 6.0 - 8.2 g/dL    Globulin 3.0 1.9 - 3.5 g/dL    A-G Ratio 1.4 g/dL   MAGNESIUM   Result Value Ref Range    Magnesium 2.2 1.5 - 2.5 mg/dL   PHOSPHORUS   Result Value Ref Range    Phosphorus 4.2 2.5 - 4.5 mg/dL   HCG QUAL SERUM   Result Value Ref Range    Beta-Hcg Qualitative Serum Negative Negative   ESTIMATED GFR   Result Value Ref Range    GFR If African American >60 >60 mL/min/1.73 m 2    GFR If Non  African American >60 >60 mL/min/1.73 m 2   DIFFERENTIAL MANUAL   Result Value Ref Range    Bands-Stabs 0.90 0.00 - 10.00 %    Metamyelocytes 1.80 %    Myelocytes 0.90 %    Manual Diff Status PERFORMED    PERIPHERAL SMEAR REVIEW   Result Value Ref Range    Peripheral Smear Review see below    PLATELET ESTIMATE   Result Value Ref Range    Plt Estimation Normal    MORPHOLOGY   Result Value Ref Range    RBC Morphology Present     Polychromia 1+    ACCU-CHEK GLUCOSE   Result Value Ref Range    Glucose - Accu-Ck >600 () 65 - 99 mg/dL   EKG (Now)   Result Value Ref Range    Report       [facility] Emergency Dept.    Test Date:  2019  Pt Name:    AMANDA BRANCH            Department: ER  MRN:        [id number]                      Room:  Gender:     Female                       Technician: [id number]  :        1990                   Requested By:ER TRIAGE PROTOCOL  Order #:    [id number]                    Reading MD: BRAD FITZGERALD MD    Measurements  Intervals                                Axis  Rate:       127                          P:          60  PA:         132                          QRS:        39  QRSD:       60                           T:          52  QT:         304  QTc:        442    Interpretive Statements  SINUS TACHYCARDIA  Compared to ECG 2019 15:56:38  T-wave abnormality no longer present    Electronically Signed On 2019 7:21:35 PDT by BRAD FITZGERALD MD       All labs reviewed by me.    COURSE & MEDICAL DECISION MAKING  Nursing notes, VS, PMSFHx reviewed in chart.     Review of past medical records shows the patient has a history of type I diabetes with a history of multiple admissions for DKA. This will be her third admission this month for DKA.    6:17 AM - Patient seen and examined at bedside. Patient will be treated with 2 Liters of IV fluids and Zofran 4 mg. Ordered HCG Qual Serum, Magnesium, Phosphorous, Beta-Hydroxybutyric acid, venous blood gas,  CBC with differential, CMP, POC UA, Accu-Chek glucose, EKG to evaluate her symptoms. The differential diagnoses include but are not limited to: DKA. Discussed with the mother and patient that she will be admitted for continued monitoring and treatment.    Patient presents today with diabetic ketoacidosis.  She is profoundly acidemic, elevated blood glucose, elevated ketones.  She is being volume resuscitated in the emergency department with 2 L of normal saline.  She will be admitted to the intensive care unit.  The hospitalist will write for the insulin drip as per our standard protocol.    7:23 AM - I spoke with Dr. Street, Hospitalist, who agrees to admit the patient.    CRITICAL CARE  I provided critical care services, which included medication orders, frequent reevaluations of the patient's condition and response to treatment, ordering and reviewing test results, and discussing the case with various consultants. The critical care time associated with the care of the patient was 35 minutes. Review chart for interventions. This time is exclusive of any other billable procedures.      HYDRATION: Based on the patient's presentation of DKA and Hyperglycemia the patient was given IV fluids. IV Hydration was used because oral hydration was not adequate alone. Upon recheck following hydration, the patient was stable.    DISPOSITION:  Patient will be admitted to [facility], in critical condition.    FINAL IMPRESSION  1. Diabetic ketoacidosis without coma associated with type 1 diabetes mellitus (HCC)    2. Leukocytosis, unspecified type    3. Poorly controlled diabetes mellitus (HCC)         The critical care time associated with the care of the patient was 35 minutes. Review chart for interventions. This time is exclusive of any other billable procedures.       IRamirez (Scribe), alden scribing for, and in the presence of, Jeb Torres M.D..    Electronically signed by: Ramirez Rutherford),  7/27/2019    IJeb M.D. personally performed the services described in this documentation, as scribed by Ramirez Hodges in my presence, and it is both accurate and complete. C.    The note accurately reflects work and decisions made by me.  Jeb Torres  7/27/2019  10:40 AM

## 2019-07-27 NOTE — PROGRESS NOTES
Trell from Lab called with critical result of CO2 of 8 at 1453. Critical lab result read back to Trell.   Dr. Benz notified of critical lab result [id number].  Critical lab result read back by Dr. Benz.

## 2019-07-27 NOTE — ASSESSMENT & PLAN NOTE
Poorly controled. A1c 12.6  Better in the hospital   Continue ISSS, insulin while in the hospital

## 2019-07-27 NOTE — H&P
Hospital Medicine History & Physical Note    Date of Service  7/27/2019    Primary Care Physician  Bruna Laboy M.D.    Consultants  Pulmonology / Critical Care: Dr Chambers    Code Status  FULL CODE     Chief Complaint  Nausea, Vomiting and abdominal pain     History of Presenting Illness  28 y.o. female who presented 7/27/2019 with above complaints.     Patient has underlying history of type 1 diabetes mellitus, recurrent hospitalization with diabetic ketoacidosis, she was recently in the intensive care unit being managed for diabetic ketoacidosis and discharged on July 22, 2019, returning back today with complaints of nausea, vomiting, abdominal pain, hyperglycemia, concerns for diabetic ketoacidosis.  Patient does have a right-sided chest port placed because of recurrent hospitalization, poor intravenous access.    Patient reports that she started having her symptoms this morning, reports taking her insulin regimen religiously at home.  Upon evaluation, she appears distressed, Kussmaul's breathing pattern is evident.  She complains of epigastric pain, nausea, vomiting, cough.  No vomitus with blood.  She started having vomiting this morning per information provided to me by her.  Denies otherwise genitourinary complaints, no blood in bowel movements, melena.    I have personally discussed the case with pulmonology/critical care, Dr. Fran Aly, patient will be admitted in the critical condition to the ICU, critical care team will be directing further care for underlying diabetic ketoacidosis at this time.    Review of Systems  Review of Systems   Constitutional: Positive for diaphoresis and malaise/fatigue. Negative for chills and fever.   HENT: Negative for hearing loss and tinnitus.    Eyes: Negative for blurred vision and double vision.   Respiratory: Positive for cough. Negative for hemoptysis, sputum production, shortness of breath and wheezing.    Cardiovascular: Negative for chest pain,  palpitations and PND.   Gastrointestinal: Positive for abdominal pain, nausea and vomiting. Negative for blood in stool, constipation, diarrhea, heartburn and melena.   Genitourinary: Negative for dysuria, flank pain, frequency, hematuria and urgency.   Musculoskeletal: Negative for back pain, falls, joint pain, myalgias and neck pain.   Skin: Negative for itching and rash.   Neurological: Positive for dizziness and weakness. Negative for headaches.       Past Medical History   has a past medical history of Backpain; Bronchitis; Diabetes type 1, controlled ([facility]); DKA (diabetic ketoacidoses) ([facility]); Fall; Gastroparesis; Kidney infection; Pneumonia; and UTI (urinary tract infection).    Surgical History   has a past surgical history that includes gastroscopy-endo (9/18/2014); gastroscopy with biopsy (9/18/2014); other; submandible abscess incision and drainage (Left, 11/8/2017); dental extraction(s) (11/8/2017); and gastroscopy-endo (N/A, 6/9/2018).     Family History  family history includes Cancer in her unknown relative; Hypertension in her maternal grandfather.     Social History   reports that she has never smoked. She has never used smokeless tobacco. She reports that she does not drink alcohol or use drugs.    Allergies  Allergies   Allergen Reactions   • Pcn [Penicillins] Shortness of Breath and Swelling     Per patient's Mom, patient tolerates Keflex   • Lisinopril Unspecified     Per historical: Reported pedal swelling. No facial/angioedema or rash nor respiratory symptoms.    • Promethazine Vomiting       Medications  Prior to Admission Medications   Prescriptions Last Dose Informant Patient Reported? Taking?   Insulin Glargine (BASAGLAR KWIKPEN) 100 UNIT/ML Solution Pen-injector 7/27/2019 at AM Patient No No   Sig: Inject 30 Units as instructed 2 Times a Day.   SUMAtriptan (IMITREX) 50 MG Tab  Patient Yes No   Sig: Take 50 mg by mouth Once PRN for Migraine. Can repeat in 2 hrs if needed   insulin lispro  (ADMELOG) 100 UNIT/ML Solution 7/27/2019 at AM  Yes No   Sig: Inject 10-18 Units as instructed 3 times a day before meals. Sliding Scale   metoclopramide (REGLAN) 10 MG Tab   Yes Yes   Sig: Take 10 mg by mouth 4 times a day.   omeprazole (PRILOSEC) 20 MG delayed-release capsule   No No   Sig: Take 1 Cap by mouth every day.   ondansetron (ZOFRAN ODT) 4 MG TABLET DISPERSIBLE   No No   Sig: Take 1 Tab by mouth every four hours as needed for Nausea (give PO if no IV route available).      Facility-Administered Medications: None       Physical Exam  Temp:  [35.9 °C (96.7 °F)] 35.9 °C (96.7 °F)  Pulse:  [125-140] 131  Resp:  [18-38] 27  BP: (114)/(72) 114/72  SpO2:  [97 %-98 %] 98 %    Physical Exam   Constitutional: She is oriented to person, place, and time. She appears well-developed and well-nourished. She appears distressed.   Kussmaul pattern of bleeding evident   HENT:   Head: Normocephalic.   Mouth/Throat: Oropharynx is clear and moist. No oropharyngeal exudate.   Eyes: Pupils are equal, round, and reactive to light. Conjunctivae are normal. No scleral icterus.   Neck: No JVD present.   Cardiovascular: Regular rhythm and normal heart sounds.  Exam reveals no gallop and no friction rub.    No murmur heard.  Tachycardia   Pulmonary/Chest: Breath sounds normal. No stridor. No respiratory distress. She has no wheezes. She has no rales.   Right-sided chest port.  Tachypnea   Abdominal: Soft. Bowel sounds are normal. She exhibits no distension. There is no tenderness. There is no rebound and no guarding.   Musculoskeletal: Normal range of motion. She exhibits no edema, tenderness or deformity.   Neurological: She is alert and oriented to person, place, and time. No cranial nerve deficit.   Skin: Skin is warm and dry. She is not diaphoretic.   Psychiatric: She has a normal mood and affect. Her behavior is normal. Judgment and thought content normal.       Laboratory:  Recent Labs      07/27/19   0633   WBC  18.1*   RBC   4.43   HEMOGLOBIN  12.1   HEMATOCRIT  41.1   MCV  92.8   MCH  27.3   MCHC  29.4*   RDW  52.2*   PLATELETCT  364   MPV  11.0     Recent Labs      07/27/19   0633   SODIUM  128*   POTASSIUM  5.8*   CHLORIDE  92*   CO2  5*   GLUCOSE  944*   BUN  20   CREATININE  1.01   CALCIUM  9.0     Recent Labs      07/27/19   0633   ALTSGPT  26   ASTSGOT  26   ALKPHOSPHAT  184*   TBILIRUBIN  0.5   GLUCOSE  944*         No results for input(s): NTPROBNP in the last 72 hours.      No results for input(s): TROPONINT in the last 72 hours.    Urinalysis:    No results found     Imaging:  MI-[id number] VIEW    (Results Pending)   DX-CHEST-LIMITED (1 VIEW)    (Results Pending)         Assessment/Plan:  I anticipate this patient will require at least two midnights for appropriate medical management, necessitating inpatient admission.    DKA, type 1 (HCC)- (present on admission)   Assessment & Plan    Patient in diabetic ketoacidosis  Intravenous insulin has been initiated  Diabetic ketoacidosis protocol  Monitor electrolytes, chemistry panel closely  Discussed with pulmonology/critical care team  Patient will be admitted under the guidance of pulmonology/critical care team to the intensive care unit  Further orders per pulmonology/critical care team at this time     Diabetes type I (CMS-HCC)- (present on admission)   Assessment & Plan    As above, for now patient is in DKA    Recommend psychiatric consultation given persistent noncompliance/recurrent behavior once acute issues have stabilized     Gastroparesis due to DM (HCC)- (present on admission)   Assessment & Plan    New Reglan, antiemetics         VTE prophylaxis: SC Lovenox

## 2019-07-27 NOTE — ED TRIAGE NOTES
"Alis Sparks  28 y.o. female  Chief Complaint   Patient presents with   • Hyperglycemia     >600 FSBS in triage   • Vomiting     x2 hours        Pt wheeled to triage for above complaint following EKG.      Pt appears pale, dry heaving in triage, grabbing her abdomen. Pt reports her BS was in the 300's when she started throwing up this AM. Pt denies missing any insulin dosages.     Charge RN notified of patient. Pt roomed by triage RN to RED 9. Report to Bernardino CHAVIRA bedside.      Hx: DM1, DKA    Blood Pressure: 114/72, Pulse: (!) 140, Respiration: 18, Temperature: 35.9 °C (96.7 °F), Height: 165.1 cm (5' 5\"), Weight: 74.3 kg (163 lb 12.8 oz), Pulse Oximetry: 97 %    "

## 2019-07-27 NOTE — ED NOTES
Received pt from triage, pt tachycardic 127 and hypertensive. Pt hyperglycemic in triage to 600s. Pt nauseous and vomiting x2 hours.

## 2019-07-27 NOTE — ASSESSMENT & PLAN NOTE
Resolving  Doing better, abdominal pain resolving   No clear trigger   Continue to monitor overnight  Follow up on repeat beta hydroxybutyrate acid since last one slightly elevated  Last A1c 12.6 bracket (4 months ago).   Questionable compliance   Pt will need endo chronology OP referral

## 2019-07-27 NOTE — CONSULTS
PULMONARY AND CRITICAL CARE MEDICINE CONSULTATION    Date of Consultation:  7/27/2019    Requesting Physician:  Jeb Torres MD    Consulting Physician:  Michael Gayle MD    Reason for Consultation: Critical care management in lady with acute DKA    Chief Complaint: Nausea, vomiting and abdominal pain    History of Present Illness:    I was kindly asked to see and evaluate Alis Sparks, a 28 y.o. female for evaluation and management of the above problem.    This lady has a history of type 1 diabetes mellitus, pyloric stenosis and gastroparesis.  She has had multiple recent admissions for DKA.  She was just here at [facility] from on or about 7/17/2019 to 7/22/2019 with an episode of DKA.  She comes into the emergency room this morning complaining of epigastric pain with nausea and vomiting as well as hyperglycemia which began this morning.  She denies fever, chills or sweats.  She denies diarrhea.  She has a dry cough.  She has no sputum production or hemoptysis.  She has no flank pain, dysuria, urgency, frequency or hematuria.  She tells me that she has been compliant with taking her insulin and has not missed any doses.    Medications Prior to Admission:    No current facility-administered medications on file prior to encounter.      Current Outpatient Prescriptions on File Prior to Encounter   Medication Sig Dispense Refill   • ondansetron (ZOFRAN ODT) 4 MG TABLET DISPERSIBLE Take 1 Tab by mouth every four hours as needed for Nausea (give PO if no IV route available). 10 Tab 1   • omeprazole (PRILOSEC) 20 MG delayed-release capsule Take 1 Cap by mouth every day. 15 Cap 0   • SUMAtriptan (IMITREX) 50 MG Tab Take 50 mg by mouth Once PRN for Migraine. Can repeat in 2 hrs if needed     • insulin lispro (ADMELOG) 100 UNIT/ML Solution Inject 10-18 Units as instructed 3 times a day before meals. Sliding Scale     • Insulin Glargine (BASAGLAR KWIKPEN) 100 UNIT/ML Solution Pen-injector Inject 30 Units  as instructed 2 Times a Day. 1 PEN 11       Current Medications:      Current Facility-Administered Medications:   •  lactated ringers infusion (BOLUS), 2,000 mL, Intravenous, Once **OR** [DISCONTINUED] NS (BOLUS) infusion 2,000 mL, 2,000 mL, Intravenous, Once, Michael Gayle M.D.  •  dextrose 10% and 0.45% NaCl infusion, , Intravenous, Continuous, Michael Gayle M.D.  •  MD ALERT-PHARMACY TO CONSULT FOR DKA MONITORING 1 Each, 1 Each, Other, PRN, Michael Gayle M.D.  •  magnesium sulfate IVPB premix 2 g, 2 g, Intravenous, Once PRN **OR** magnesium sulfate IVPB premix 4 g, 4 g, Intravenous, Once PRN, Michael Gayle M.D.  •  potassium phosphates 30 mmol in  mL ivpb, 30 mmol, Intravenous, Once PRN **OR** sodium phosphate 30 mmol in 1/2  mL ivpb, 30 mmol, Intravenous, Once PRN, Michael Gayle M.D.  •  Adult DKA potassium(K+) replacement scale, 1 Each, Intravenous, Q4HRS, Michael Gayle M.D.  •  lactated ringers infusion, , Intravenous, Continuous, Michael Gayle M.D.  •  D5 1/2 NS infusion, , Intravenous, Continuous, Michael Gayle M.D.  •  insulin regular (HUMULIN R) injection 7.5 Units, 0.1 Units/kg, Intravenous, Once, Michael Gayle M.D.  •  MD ALERT-INSULIN DRIP INITIAL RATE BY PHARMACY AT 0.05 UNITS/KG/HR 1 Each, 1 Each, Other, PRN, Michael Gayle M.D.    Current Outpatient Prescriptions:   •  metoclopramide (REGLAN) 10 MG Tab, Take 10 mg by mouth 4 times a day., Disp: , Rfl:   •  ondansetron (ZOFRAN ODT) 4 MG TABLET DISPERSIBLE, Take 1 Tab by mouth every four hours as needed for Nausea (give PO if no IV route available)., Disp: 10 Tab, Rfl: 1  •  omeprazole (PRILOSEC) 20 MG delayed-release capsule, Take 1 Cap by mouth every day., Disp: 15 Cap, Rfl: 0  •  SUMAtriptan (IMITREX) 50 MG Tab, Take 50 mg by mouth Once PRN for Migraine. Can repeat in 2 hrs if needed, Disp: , Rfl:   •  insulin lispro (ADMELOG) 100 UNIT/ML Solution,  Inject 10-18 Units as instructed 3 times a day before meals. Sliding Scale, Disp: , Rfl:   •  Insulin Glargine (BASAGLAR KWIKPEN) 100 UNIT/ML Solution Pen-injector, Inject 30 Units as instructed 2 Times a Day., Disp: 1 PEN, Rfl: 11    Allergies:    Pcn [penicillins]; Lisinopril; and Promethazine    Past Surgical History:    Past Surgical History:   Procedure Laterality Date   • GASTROSCOPY-ENDO N/A 6/9/2018    Procedure: GASTROSCOPY-ENDO WITH BIOPSIES AND DIALATION;  Surgeon: Marino Black M.D.;  Location: SURGERY [facility];  Service: Gastroenterology   • SUBMANDIBLE ABSCESS INCISION AND DRAINAGE Left 11/8/2017    Procedure: SUBMANDIBLE ABSCESS INCISION AND DRAINAGE;  Surgeon: Osman Young M.D.;  Location: SURGERY [facility];  Service: Dental   • DENTAL EXTRACTION(S)  11/8/2017    Procedure: DENTAL EXTRACTION(S);  Surgeon: Osman Young M.D.;  Location: SURGERY [facility];  Service: Dental   • GASTROSCOPY-ENDO  9/18/2014    Performed by Sylvain PERRY M.D. at ENDOSCOPY [facility]   • GASTROSCOPY WITH BIOPSY  9/18/2014    Performed by Sylvain PERRY M.D. at ENDOSCOPY [facility]   • OTHER      surgery to patch lung after pneumor from central line placement       Past Medical History:    Past Medical History:   Diagnosis Date   • Backpain    • Bronchitis    • Diabetes type 1, controlled ([facility])     tests 4-5 times daily   • DKA (diabetic ketoacidoses) ([facility])    • Fall    • Gastroparesis    • Kidney infection    • Pneumonia    • UTI (urinary tract infection)        Social History:    Social History     Social History   • Marital status: Single     Spouse name: N/A   • Number of children: N/A   • Years of education: N/A     Occupational History   • Not on file.     Social History Main Topics   • Smoking status: Never Smoker   • Smokeless tobacco: Never Used   • Alcohol use No   • Drug use: No   • Sexual activity: No     Other Topics Concern   • Not on file     Social History Narrative   • No  "narrative on file       Family History:    Family History   Problem Relation Age of Onset   • Cancer Unknown         breasts, multiple family members   • Hypertension Maternal Grandfather        Review of System:    Review of Systems   Constitutional: Positive for malaise/fatigue. Negative for chills and fever.   HENT: Negative for ear discharge, ear pain and tinnitus.    Eyes: Negative for blurred vision, double vision and photophobia.   Respiratory: Positive for cough. Negative for hemoptysis, sputum production, shortness of breath and stridor.    Cardiovascular: Negative for chest pain, palpitations and leg swelling.   Gastrointestinal: Positive for abdominal pain, nausea and vomiting. Negative for blood in stool.   Genitourinary: Negative for dysuria, hematuria and urgency.   Musculoskeletal: Negative for myalgias and neck pain.   Skin: Negative for rash.   Neurological: Positive for weakness. Negative for sensory change, speech change, focal weakness, seizures and headaches.   Endo/Heme/Allergies: Does not bruise/bleed easily.   Psychiatric/Behavioral: Negative for hallucinations and suicidal ideas. The patient is not nervous/anxious and does not have insomnia.        Physical Examination:    /72   Pulse (!) 125   Temp 35.9 °C (96.7 °F) (Temporal)   Resp (!) 34   Ht 1.651 m (5' 5\")   Wt 74.3 kg (163 lb 12.8 oz)   LMP 07/17/2019   SpO2 98%   BMI 27.26 kg/m²   Physical Exam   Constitutional: She is oriented to person, place, and time. She appears distressed.   Ill-appearing lady   HENT:   Head: Normocephalic and atraumatic.   Right Ear: External ear normal.   Left Ear: External ear normal.   Nose: Nose normal.   Dry mucous membranes   Eyes: Pupils are equal, round, and reactive to light. Conjunctivae are normal. Right eye exhibits no discharge. Left eye exhibits no discharge.   Neck: Normal range of motion. Neck supple. No JVD present. No tracheal deviation present.   Cardiovascular: Intact distal " pulses.  Exam reveals no gallop.    No murmur heard.  Sinus tachycardia   Pulmonary/Chest: No stridor. She has no wheezes. She has no rales.   Abdominal: Soft. Bowel sounds are normal. She exhibits no distension. There is tenderness (Mild in the epigastrium). There is no rebound and no guarding.   Musculoskeletal: Normal range of motion. She exhibits no edema, tenderness or deformity.   No clubbing or cyanosis   Neurological: She is alert and oriented to person, place, and time. No cranial nerve deficit. Coordination normal. GCS score is 15.   No focal weakness   Skin: Skin is warm and dry. No rash noted. She is not diaphoretic. No erythema.       Laboratory Data:        Recent Labs      07/27/19   0633   WBC  18.1*   RBC  4.43   HEMOGLOBIN  12.1   HEMATOCRIT  41.1   MCV  92.8   MCH  27.3   MCHC  29.4*   RDW  52.2*   PLATELETCT  364   MPV  11.0     Recent Labs      07/27/19   0633   SODIUM  128*   POTASSIUM  5.8*   CHLORIDE  92*   CO2  5*   GLUCOSE  944*   BUN  20   CREATININE  1.01   CALCIUM  9.0                   Imaging:    I personally viewed the CXR and CT scan images as well as reviewed the radiology interpretation reports.    No orders to display       Assessment and Plan:    DKA, type 1 ([facility])- (present on admission)   Assessment & Plan    Admit ICU  Bolus with IV fluids  I am titrating an insulin drip based upon serial determinations of blood glucose and acid-base status  Monitor blood glucose every hour  Monitor acid-base status and electrolytes every 4 hours  Replete potassium, phosphorus and magnesium based upon electrolyte determinations     Diabetes type I ([facility])- (present on admission)   Assessment & Plan    Now in DKA     Gastroparesis due to DM ([facility])- (present on admission)   Assessment & Plan    Antiemetics as necessary       I have assessed and reassessed her blood glucose and acid-base status with titration of an insulin drip, urine output, respiratory status, blood pressure, hemodynamics,  cardiovascular status and response to IV fluid resuscitation.  This lady is critically ill with acute DKA.  She is admitted to the ICU.  She is at high risk for worsening metabolic/endocrine system dysfunction.    High risk of deterioration and worsening vital organ dysfunction and death without the above critical care interventions.    Thank you for allowing me to participate in the care of this lady.  I will continue to follow her with great interest.    Critical Care Time: 35 minutes  [id number]  No time overlap  Time excludes procedures  Discussed with RN, RT, ER physician, hospitalist, clinical pharmacist    Michael Gayle MD  Pulmonary and Critical Care Medicine

## 2019-07-28 NOTE — PROGRESS NOTES
MD Gomez notified of Pt's current labs and status per protocol. Orders to continue to DKA protocol until next BMP.

## 2019-07-28 NOTE — PSYCHIATRY
BRIEF PSYCHIATRIC CONSULT NOTE: patient seen, full note to follow.  -Legal Hold:not indicated  -Sitter:no  -Restrictions:   -phone:yes   -visitors:yes   -personal items: yes   -personal/undergarments clothes: Yes   -medical bed:yes   -Orders Placed: None  -Assessment: Does not meet criteria for any psychiatric disorder. Reports good compliance and is knowledgeable about regimen. Collateral obtained from mother who denies any concerns for mental health or safety and reports they observe her medication admin and blood sugar checks regularly.   -Plan:signing off

## 2019-07-28 NOTE — CARE PLAN
Problem: Safety  Goal: Will remain free from injury  Pt encouraged to use call light when needing assistance to the commode.     Problem: Pain Management  Goal: Pain level will decrease to patient's comfort goal  Pt encouraged to participate in pain management.

## 2019-07-28 NOTE — PROGRESS NOTES
Upon first assessment patient is drowsy but oriented x 4.  Pt has c/o pain 8/10 upper abdominal area.  VSS.  Pt does a lot of sleeping on her sides.  Pt also has facial edema.    Discussed getting up to a chair today, possible a shower and brushing her teeth.  At this time pt refused.  Pt very tired.    Compazine was ordered and her morphine was changed to every 4 hours.

## 2019-07-28 NOTE — PROGRESS NOTES
Critical Care Progress Note    Date of admission  7/27/2019    Chief Complaint  28 y.o. female admitted 7/27/2019 with history of type 1 diabetes mellitus, pyloric stenosis and gastroparesis.  She has had multiple recent admissions for DKA.  She was just here at [facility] from on or about 7/17/2019 to 7/22/2019 with an episode of DKA.  She comes into the emergency room this morning complaining of epigastric pain with nausea and vomiting as well as hyperglycemia which began this morning.  She denies fever, chills or sweats.  She denies diarrhea.  She has a dry cough.  She has no sputum production or hemoptysis.  She has no flank pain, dysuria, urgency, frequency or hematuria.  She tells me that she has been compliant with taking her insulin and has not missed any doses. Taken from Dr Gayle note.     Hospital Course  Admit to ICU 7/27     Interval Problem Update  Reviewed last 24 hour events:  Neuro: nausea and pain aox4   HR: snr   SBP: 140's  Tmax: afebrile  GI: npo dka N/v + BM  UOP: 1000  Lines: port  Resp: room air   Vte: lovenox  PPI/H2:home ppi  Antibx: none    Compazine + reglan  Minimize narcotic  Qtc 430 7/27  Psych eval        Review of Systems  Review of Systems   Constitutional: Negative for chills, diaphoresis and fever.   HENT: Negative for nosebleeds and sore throat.    Eyes: Negative for photophobia.   Cardiovascular: Negative for chest pain, palpitations, orthopnea and leg swelling.   Gastrointestinal: Positive for abdominal pain, nausea and vomiting. Negative for blood in stool, constipation and diarrhea.   Genitourinary: Negative for frequency, hematuria and urgency.   Musculoskeletal: Negative for back pain, joint pain and neck pain.   Neurological: Negative for tingling, tremors, speech change, focal weakness and headaches.   Endo/Heme/Allergies: Negative for environmental allergies.   Psychiatric/Behavioral: Negative for depression, hallucinations, substance abuse and suicidal ideas.    All other systems reviewed and are negative.       Vital Signs for last 24 hours   Temp:  [36.4 °C (97.6 °F)-37.2 °C (98.9 °F)] 36.9 °C (98.4 °F)  Pulse:  [] 107  Resp:  [1-30] 1  SpO2:  [94 %-100 %] 96 %    Hemodynamic parameters for last 24 hours       Respiratory Information for the last 24 hours       Physical Exam   Physical Exam   Constitutional: She is oriented to person, place, and time. She appears well-developed and well-nourished. She appears distressed.   HENT:   Head: Normocephalic and atraumatic.   dry   Eyes: Pupils are equal, round, and reactive to light. EOM are normal.   Neck: No JVD present. No thyromegaly present.   Cardiovascular: Normal rate.    No murmur heard.  Pulmonary/Chest: No respiratory distress. She has no wheezes. She has no rales.   Right sided chest port   Abdominal: She exhibits no distension. There is no tenderness. There is no rebound and no guarding.   Musculoskeletal: Normal range of motion. She exhibits no edema or tenderness.   Neurological: She is alert and oriented to person, place, and time. No cranial nerve deficit. Coordination normal.   Moves all extremities, AOX4 answers appropriate   Skin: Skin is warm and dry. She is not diaphoretic.   Psychiatric: She has a normal mood and affect.       Medications  Current Facility-Administered Medications   Medication Dose Route Frequency Provider Last Rate Last Dose   • potassium chloride in water (KCL) ivpb **Administer in ICU only** 40 mEq  40 mEq Intravenous Once Hugo Benz M.D. 25 mL/hr at 07/28/19 0838 40 mEq at 07/28/19 0838   • prochlorperazine (COMPAZINE) injection 10 mg  10 mg Intravenous Q6HRS PRN Hugo Benz M.D.   10 mg at 07/28/19 0837   • Pharmacy Consult Request ...Pain Management Review 1 Each  1 Each Other PHARMACY TO DOSE Hugo Benz M.D.        And   • oxyCODONE immediate-release (ROXICODONE) tablet 2.5 mg  2.5 mg Oral Q3HRS PRN Hugo Benz M.D.        And   • oxyCODONE  immediate-release (ROXICODONE) tablet 5 mg  5 mg Oral Q3HRS PRN Hugo Benz M.D.        And   • morphine (pf) 4 mg/ml injection 2 mg  2 mg Intravenous Q4HRS PRN Hugo Benz M.D.   2 mg at 07/28/19 0912   • dextrose 10% and 0.45% NaCl infusion   Intravenous Continuous Michael Gayle M.D. 125 mL/hr at 07/28/19 0248     • MD ALERT-PHARMACY TO CONSULT FOR DKA MONITORING 1 Each  1 Each Other PRN Michael Gayle M.D.       • Adult DKA potassium(K+) replacement scale  1 Each Intravenous Q4HRS Michael Gayel M.D.   Stopped at 07/28/19 0600   • insulin regular human (HUMULIN/NOVOLIN R) 62.5 Units in  mL Infusion for DKA  3.5 Units/hr Intravenous Continuous Mihcael Gayle M.D. 14 mL/hr at 07/28/19 0855 3.5 Units/hr at 07/28/19 0855   • omeprazole (PRILOSEC) capsule 20 mg  20 mg Oral DAILY Chris Street M.D.   20 mg at 07/28/19 0522   • senna-docusate (PERICOLACE or SENOKOT S) 8.6-50 MG per tablet 2 Tab  2 Tab Oral BID Chris Street M.D.   2 Tab at 07/28/19 0522    And   • polyethylene glycol/lytes (MIRALAX) PACKET 1 Packet  1 Packet Oral QDAY PRN Chris Street M.D.        And   • magnesium hydroxide (MILK OF MAGNESIA) suspension 30 mL  30 mL Oral QDAY PRN Chris Street M.D.        And   • bisacodyl (DULCOLAX) suppository 10 mg  10 mg Rectal QDAY PRN Chris Street M.D.       • Respiratory Care per Protocol   Nebulization Continuous RT Chris Street M.D.       • enoxaparin (LOVENOX) inj 40 mg  40 mg Subcutaneous DAILY Chris Street M.D.   40 mg at 07/28/19 0522   • acetaminophen (TYLENOL) tablet 650 mg  650 mg Oral Q6HRS PRN Chris Street M.D.       • cloNIDine (CATAPRES) tablet 0.1 mg  0.1 mg Oral Q6HRS PRN Chris Street M.D.       • ondansetron (ZOFRAN) syringe/vial injection 4 mg  4 mg Intravenous Q4HRS PRN Chris Street M.D.       • ondansetron (ZOFRAN ODT) dispertab 4 mg  4 mg Oral Q4HRS PRN Chris Street M.D.       • metoclopramide (REGLAN) injection 10 mg  10 mg Intravenous Q6HRS  Chris Street M.D.   10 mg at 07/28/19 0522       Fluids    Intake/Output Summary (Last 24 hours) at 07/28/19 1006  Last data filed at 07/28/19 0600   Gross per 24 hour   Intake          6443.78 ml   Output             2600 ml   Net          3843.78 ml       Laboratory          Recent Labs      07/27/19   1017   07/27/19   1753  07/27/19   2315  07/28/19   0200  07/28/19   0605   SODIUM  139   < >  141  144  142  141   POTASSIUM  4.8   < >  4.1  4.0  3.5*  3.0*   CHLORIDE  107   < >  114*  115*  114*  113*   CO2  <5*   < >  13*  18*  16*  20   BUN  23*   < >  14  14  12  10   CREATININE  0.92   < >  0.62  0.53  0.56  0.43*   MAGNESIUM  2.2   --   2.0   --   1.8   --    PHOSPHORUS  5.0*   --   1.7*   --   2.1*   --    CALCIUM  7.9*   < >  7.9*  7.9*  7.7*  7.6*    < > = values in this interval not displayed.     Recent Labs      07/27/19   0633   07/27/19   2315  07/28/19   0200  07/28/19   0605   ALTSGPT  26   --    --    --    --    ASTSGOT  26   --    --    --    --    ALKPHOSPHAT  184*   --    --    --    --    TBILIRUBIN  0.5   --    --    --    --    GLUCOSE  944*   < >  140*  167*  126*    < > = values in this interval not displayed.     Recent Labs      07/27/19   0633   WBC  18.1*   NEUTSPOLYS  64.90   LYMPHOCYTES  19.80*   MONOCYTES  7.20   EOSINOPHILS  0.90   BASOPHILS  3.60*   ASTSGOT  26   ALTSGPT  26   ALKPHOSPHAT  184*   TBILIRUBIN  0.5     Recent Labs      07/27/19   0633  07/27/19   0925   RBC  4.43   --    HEMOGLOBIN  12.1   --    HEMATOCRIT  41.1   --    PLATELETCT  364   --    PROTHROMBTM   --   16.1*   APTT   --   20.2*   INR   --   1.26*       Imaging  X-Ray:  I have personally reviewed the images and compared with prior images., My impression is: right side port no infiltrates and No film today    Assessment/Plan  DKA, type 1 ([facility])- (present on admission)   Assessment & Plan    Admit ICU  Continue IV fluids  I am titrating an insulin drip based upon serial determinations of blood glucose and  acid-base status  Monitor blood glucose  Monitor acid-base status and electrolytes every 4 hours  Replete potassium, phosphorus and magnesium based upon electrolyte determinations     Diabetes type I ([facility])- (present on admission)   Assessment & Plan    Now in DKA     Gastroparesis due to DM ([facility])- (present on admission)   Assessment & Plan    Antiemetics as necessary  Aggressive with reglan and compazine  Avoid narcotics as much as possible          VTE:  Lovenox  Ulcer: PPI  Lines: port    I have performed a physical exam and reviewed and updated ROS and Plan today (7/28/2019). In review of yesterday's note (7/27/2019), there are no changes except as documented above.     Discussed patient condition and risk of morbidity and/or mortality with Hospitalist, RN, RT, Pharmacy, Charge nurse / hot rounds and Patient     The patient remains critically ill from DKA on insulin gtt with active titrtaion.  Critical care time = 40 minutes in directly providing and coordinating critical care and extensive data review.  No time overlap and excludes procedures.

## 2019-07-28 NOTE — PSYCHIATRY
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    Collection Time: 07/28/19  5:07 AM   Result Value Ref Range    Glucose - Accu-Ck 123 (H) 65 - 99 mg/dL   ACCU-CHEK GLUCOSE    Collection Time: 07/28/19  6:03 AM   Result Value Ref Range    Glucose - Accu-Ck 116 (H) 65 - 99 mg/dL   Basic Metabolic Panel    Collection Time: 07/28/19  6:05 AM   Result Value Ref Range    Sodium 141 135 - 145 mmol/L    Potassium 3.0 (L) 3.6 - 5.5 mmol/L    Chloride 113 (H) 96 - 112 mmol/L    Co2 20 20 - 33 mmol/L    Glucose 126 (H) 65 - 99 mg/dL    Bun 10 8 - 22 mg/dL    Creatinine 0.43 (L) 0.50 - 1.40 mg/dL    Calcium 7.6 (L) 8.5 - 10.5 mg/dL    Anion Gap 8.0 0.0 - 11.9   ESTIMATED GFR    Collection Time: 07/28/19  6:05 AM   Result Value Ref Range    GFR If African American >60 >60 mL/min/1.73 m 2    GFR If Non African American >60 >60 mL/min/1.73 m 2   ACCU-CHEK GLUCOSE    Collection Time: 07/28/19  7:22 AM   Result Value Ref Range    Glucose - Accu-Ck 105 (H) 65 - 99 mg/dL   ACCU-CHEK GLUCOSE    Collection Time: 07/28/19  8:53 AM   Result Value Ref Range    Glucose - Accu-Ck 100 (H) 65 - 99 mg/dL       Cranial Imaging: reviewed  [id number] VIEW   Final Result      1.  Bowel  gas pattern is nonobstructive. There is a large amount of colonic stool.      DX-CHEST-LIMITED (1 VIEW)   Final Result      1.  No acute cardiac or pulmonary abnormalities are identified.          ECG: QTc 437 on 7/27    ASSESSMENT:   -DKA  -Type 1 diabetes    Patient is a 28-year-old female with a medical history of type 1 diabetes and a potential history of PTSD with 17 diabetes related admissions in the last 2 years.  She currently is reporting a euthymic mood and denying symptoms consistent with depression including suicidal ideation.  This report is been corroborated by her mother.  The patient does report impaired appetite and sleeping and her mother reports more frequent irritability.  The patient, her mother and her PCP, by report, associate the symptoms with diabetes rather than mental health, there is an endocrinology referral pending.  At this time it does not appear that the patient's frequent DKA and admission are secondary to a psychiatric disorder however she does report some potential symptoms.  Extensive discussion was had with the patient and her mother to consider psychotherapy and mental health follow-up if the symptoms do not resolve or improve with better diabetic control.  At this time the patient does not appear to be an imminent threat to herself or others and appears capable of caring for herself, no legal hold indicated.  The patient does not appear to have symptoms indicative of the need for psychotropic medications.  Psychiatry signing off.      PLAN:  Legal status: not applicable    -Encouraged psychotherapy and mental health follow-up      Signing off     Labs/tests ordered:     Discussed patient with provider Dr. Shearer    Thank you for the consult.

## 2019-07-28 NOTE — PROGRESS NOTES
[facility] Medicine Daily Progress Note    Date of Service  7/28/2019    Chief Complaint  28 y.o. female admitted 7/27/2019 with nausea vomiting and abdominal pain    Hospital Course    Mrs. Sparks has a history of type 1 diabetes, she is frequently been hospitalized for diabetic ketoacidosis.  She was last discharged from the hospital 7/22/2019.  Patient presents again on 7/27/2019 with symptoms of nausea vomiting abdominal pain.  Evaluation demonstrated that she was in diabetic ketoacidosis, she was admitted to the ICU and started on an insulin drip.      Interval Problem Update  Admitted yesterday  Continued abdominal pain, nausea and vomiting today, clear emesis, no hematemesis  Pain is better with IV morphine  She is not particularly talkative, does not have other complaints, tells me that she has been taking her insulin as prescribed  Remains on insulin drip, FSBG's 100's, on D10 and 3.5 units of insulin    Consultants/Specialty  Critical Care, I discussed the patient's condition with Dr. Benz today on ICU Rounds    Code Status  Full Code    Disposition  ICU for insulin drip    Review of Systems  Review of Systems   Constitutional: Negative for chills and malaise/fatigue.   Respiratory: Negative for cough, hemoptysis and sputum production.    Cardiovascular: Negative for chest pain, palpitations and orthopnea.   Gastrointestinal: Positive for abdominal pain and nausea.   Skin: Negative for itching and rash.   Neurological: Negative for dizziness.   All other systems reviewed and are negative.       Physical Exam  Temp:  [36.4 °C (97.6 °F)-37.2 °C (98.9 °F)] 36.5 °C (97.7 °F)  Pulse:  [] 105  Resp:  [5-35] 5  SpO2:  [94 %-100 %] 95 %    Physical Exam   Constitutional: She is oriented to person, place, and time. She appears well-developed and well-nourished.   Appears uncomfortable, fatigued   HENT:   Head: Normocephalic and atraumatic.   Eyes: Conjunctivae and EOM are normal. Right eye exhibits no  discharge. Left eye exhibits no discharge.   Cardiovascular: Normal rate, regular rhythm and intact distal pulses.    No murmur heard.  2+ Radial Pulses  Brisk Capillary Refill   Pulmonary/Chest: Effort normal and breath sounds normal. No respiratory distress. She has no wheezes.   Abdominal: Soft. Bowel sounds are normal. She exhibits no distension. There is no tenderness. There is no rebound.   Musculoskeletal: Normal range of motion. She exhibits no edema.   Neurological: She is alert and oriented to person, place, and time. No cranial nerve deficit.   Skin: Skin is warm and dry. She is not diaphoretic. No erythema.   Skin is warm and well perfused   Psychiatric: She has a normal mood and affect.       Fluids    Intake/Output Summary (Last 24 hours) at 07/28/19 0731  Last data filed at 07/28/19 0400   Gross per 24 hour   Intake          8147.86 ml   Output             2600 ml   Net          5547.86 ml       Laboratory  Recent Labs      07/27/19   0633   WBC  18.1*   RBC  4.43   HEMOGLOBIN  12.1   HEMATOCRIT  41.1   MCV  92.8   MCH  27.3   MCHC  29.4*   RDW  52.2*   PLATELETCT  364   MPV  11.0     Recent Labs      07/27/19   1753  07/27/19   2315  07/28/19   0200   SODIUM  141  144  142   POTASSIUM  4.1  4.0  3.5*   CHLORIDE  114*  115*  114*   CO2  13*  18*  16*   GLUCOSE  186*  140*  167*   BUN  14  14  12   CREATININE  0.62  0.53  0.56   CALCIUM  7.9*  7.9*  7.7*     Recent Labs      07/27/19   0925   APTT  20.2*   INR  1.26*               Imaging  [id number] VIEW   Final Result      1.  Bowel gas pattern is nonobstructive. There is a large amount of colonic stool.      DX-CHEST-LIMITED (1 VIEW)   Final Result      1.  No acute cardiac or pulmonary abnormalities are identified.           Assessment/Plan  DKA, type 1 (HCC)- (present on admission)   Assessment & Plan    Patient in diabetic ketoacidosis  Her abdominal symptoms are slow to resolve, this is typical for her  Continue insulin drip until improvement  in her nausea and vomiting  Titrate drip to serial POC glucose checks and q 4 hour BMP's     Diabetes type I (CMS-HCC)- (present on admission)   Assessment & Plan    Uncontrolled with frequent episodes of DKA     Gastroparesis due to DM (HCC)- (present on admission)   Assessment & Plan    New Reglan, antiemetics     Hypokalemia- (present on admission)   Assessment & Plan    Replace IV  Repeat labs ordered to follow up response to treatment          VTE prophylaxis: Lovenox

## 2019-07-29 NOTE — PROGRESS NOTES
[facility] Medicine Daily Progress Note    Date of Service  7/29/2019    Chief Complaint  28 y.o. female admitted 7/27/2019 with nausea vomiting and abdominal pain    Hospital Course    Mrs. Sparks has a history of type 1 diabetes, she is frequently been hospitalized for diabetic ketoacidosis.  She was last discharged from the hospital 7/22/2019.  Patient presents again on 7/27/2019 with symptoms of nausea vomiting abdominal pain.  Evaluation demonstrated that she was in diabetic ketoacidosis, she was admitted to the ICU and started on an insulin drip.      Interval Problem Update  Remains on insulin drip  Generalized abdominal pain, about the same as yesterday, 4/10, no radiation  Still nauseated, frequent non-bloody emesis, tolerating some ice chips    Consultants/Specialty  Critical care, I discussed the patient's condition with Dr. Alan today on ICU rounds    Code Status  Full Code    Disposition  ICU for insulin drip    Review of Systems  Review of Systems   Constitutional: Positive for malaise/fatigue. Negative for chills.   Respiratory: Negative for cough, hemoptysis and sputum production.    Cardiovascular: Negative for chest pain and palpitations.   Gastrointestinal: Positive for abdominal pain, nausea and vomiting. Negative for constipation and diarrhea.   Skin: Negative for itching and rash.   Neurological: Negative for dizziness.   All other systems reviewed and are negative.       Physical Exam  Temp:  [36.3 °C (97.4 °F)] 36.3 °C (97.4 °F)  Pulse:  [] 97  Resp:  [13-23] 18  SpO2:  [93 %-99 %] 98 %    Physical Exam   Constitutional: She is oriented to person, place, and time. She appears well-developed and well-nourished. No distress.   Appears uncomfortable, fatigued   HENT:   Head: Normocephalic and atraumatic.   Eyes: Conjunctivae and EOM are normal. Right eye exhibits no discharge. Left eye exhibits no discharge. No scleral icterus.   Cardiovascular: Normal rate, regular rhythm and intact  distal pulses.    No murmur heard.  2+ Radial Pulses  Brisk Capillary Refill   Pulmonary/Chest: Effort normal and breath sounds normal. No respiratory distress. She has no wheezes.   Abdominal: Soft. Bowel sounds are normal. She exhibits no distension. There is tenderness. There is no rebound and no guarding.   Musculoskeletal: Normal range of motion. She exhibits no edema.   Neurological: She is alert and oriented to person, place, and time. No cranial nerve deficit.   Skin: Skin is warm and dry. She is not diaphoretic. No erythema.   Skin is warm and well perfused   Psychiatric:   Depressed mood and affect       Fluids    Intake/Output Summary (Last 24 hours) at 07/29/19 0902  Last data filed at 07/29/19 0600   Gross per 24 hour   Intake          3617.62 ml   Output             1650 ml   Net          1967.62 ml       Laboratory  Recent Labs      07/27/19   0633   WBC  18.1*   RBC  4.43   HEMOGLOBIN  12.1   HEMATOCRIT  41.1   MCV  92.8   MCH  27.3   MCHC  29.4*   RDW  52.2*   PLATELETCT  364   MPV  11.0     Recent Labs      07/28/19   2345  07/29/19   0345  07/29/19   0756   SODIUM  141  139  139   POTASSIUM  3.7  3.6  3.9   CHLORIDE  114*  113*  113*   CO2  18*  17*  17*   GLUCOSE  148*  196*  192*   BUN  5*  5*  4*   CREATININE  0.46*  0.42*  0.49*   CALCIUM  7.3*  7.5*  7.7*     Recent Labs      07/27/19   0925   APTT  20.2*   INR  1.26*               Imaging  [id number] VIEW   Final Result      1.  Bowel gas pattern is nonobstructive. There is a large amount of colonic stool.      DX-CHEST-LIMITED (1 VIEW)   Final Result      1.  No acute cardiac or pulmonary abnormalities are identified.           Assessment/Plan  DKA, type 1 (HCC)- (present on admission)   Assessment & Plan    Patient presents with recurrent DKA, trigger unclear, query non-compliance or severe gastroparesis at home   Her abdominal symptoms are slow to resolve, this is typical for her  Continue insulin drip but transition to 180  protocol  Titrate drip to serial POC glucose checks and serial BMP's  Check urine drug screen     Diabetes type I (CMS-HCC)- (present on admission)   Assessment & Plan    Uncontrolled with frequent episodes of DKA     Gastroparesis due to DM (HCC)- (present on admission)   Assessment & Plan    Correct blood sugars  IV anti-emetics     Hypokalemia- (present on admission)   Assessment & Plan    Replace IV  Repeat labs ordered to follow up response to treatment          VTE prophylaxis: Lovenox

## 2019-07-29 NOTE — PROGRESS NOTES
1745 spoke with Dr. Benz and Dr Benítez.  Pt's blood sugar was 87.  At this time we have D10 1/2 NS running at 150ml/hr and Insulin drip at 3.5units/hour.    Verbal orders to keep drip at maintenance drip at 150ml/hr and drop insulin to 3 units/hr.  Blood sugars to be done every 2 hours and to call Dr. Gomez for updates.

## 2019-07-29 NOTE — PROGRESS NOTES
Per Dr. Alan, please start insulin drip at 2 units per hour. Change Maintenance fluid to LR infusing at 125/ hour.     Every 12 hours review BMP.     Patient updated in POC.

## 2019-07-29 NOTE — CARE PLAN
Problem: Safety  Goal: Will remain free from injury  Pt encouraged to use call light when needing assistance getting up to BSC.     Problem: Pain Management  Goal: Pain level will decrease to patient's comfort goal  Pt encouraged to participate in pain management.

## 2019-07-29 NOTE — PROGRESS NOTES
12 hour chart check. Assumed care of patient at 0700. Received bedside report from CAIT Chadwick. Patient comfortable in bed with no needs or concerns at this time. Bed alarm set, call light within reach, bed in lowest position, treaded slipper socks on. See flowsheets for VS. Monitor ranges appropriate. Patient's pain is 5 / 10. See MAR for medication administration. Patient had 2 episodes of emesis. No family at bed side. Will continue to monitor closely.     Drips verified. RASS -1.

## 2019-07-29 NOTE — PROGRESS NOTES
Critical Care Progress Note    Date of admission  7/27/2019    Chief Complaint  28 y.o. female admitted 7/27/2019 with history of type 1 diabetes mellitus, pyloric stenosis and gastroparesis.  She has had multiple recent admissions for DKA.  She was just here at [facility] from on or about 7/17/2019 to 7/22/2019 with an episode of DKA.  She comes into the emergency room this morning complaining of epigastric pain with nausea and vomiting as well as hyperglycemia which began this morning.  She denies fever, chills or sweats.  She denies diarrhea.  She has a dry cough.  She has no sputum production or hemoptysis.  She has no flank pain, dysuria, urgency, frequency or hematuria.  She tells me that she has been compliant with taking her insulin and has not missed any doses. Taken from Dr Gayle note.     Hospital Course  Admit to ICU 7/27     Interval Problem Update  Reviewed last 24 hour events:   - on insulin gtt   - Neuro: AOx4   - HR: NSR 90s-100s   - SBP: 90s-140s   - GI: NPO, emesis   - UOP: 1.5L/24 hrs   - [place]: no   - Tm: afeb   - Lines: port   - PPx: GI PPI - home, DVT lovenox   - RA   - CXR (personally reviewed and compared to prior): no new    Yesterday  Neuro: nausea and pain aox4   HR: snr   SBP: 140's  Tmax: afebrile  GI: npo dka N/v + BM  UOP: 1000  Lines: port  Resp: room air   Vte: lovenox  PPI/H2:home ppi  Antibx: none    Compazine + reglan  Minimize narcotic  Qtc 430 7/27  Psych eval        Review of Systems  Review of Systems   Constitutional: Negative for chills and fever.   Eyes: Negative.    Respiratory: Negative for cough, sputum production, shortness of breath and stridor.    Cardiovascular: Negative for chest pain.   Gastrointestinal: Positive for abdominal pain, nausea and vomiting.   Genitourinary: Negative for dysuria.   Musculoskeletal: Negative for myalgias.   Skin: Negative for rash.   Neurological: Negative for dizziness, sensory change and focal weakness.    Psychiatric/Behavioral: Negative.         Vital Signs for last 24 hours   Temp:  [36.2 °C (97.2 °F)-36.3 °C (97.4 °F)] 36.3 °C (97.4 °F)  Pulse:  [] 97  Resp:  [13-23] 18  SpO2:  [93 %-99 %] 98 %    Hemodynamic parameters for last 24 hours       Respiratory Information for the last 24 hours       Physical Exam   Physical Exam   Constitutional: She is oriented to person, place, and time. She appears well-developed and well-nourished. She appears distressed.   HENT:   Head: Normocephalic and atraumatic.   Right Ear: External ear normal.   Left Ear: External ear normal.   Nose: Nose normal.   dry   Eyes: Conjunctivae and EOM are normal.   Neck: Neck supple. No JVD present. No thyromegaly present.   Cardiovascular: Normal rate, regular rhythm and normal heart sounds.    No murmur heard.  Pulmonary/Chest: Effort normal and breath sounds normal. No respiratory distress. She has no wheezes. She has no rales.   Right sided chest port accessed   Abdominal: Soft. Bowel sounds are normal. She exhibits no distension. There is tenderness. There is no rebound and no guarding.   Musculoskeletal: Normal range of motion. She exhibits no edema or tenderness.   Neurological: She is alert and oriented to person, place, and time. No cranial nerve deficit. Coordination normal.   Moves all extremities, AOX4 answers appropriate   Skin: Skin is warm and dry. She is not diaphoretic.   Psychiatric: She has a normal mood and affect.   Nursing note and vitals reviewed.      Medications  Current Facility-Administered Medications   Medication Dose Route Frequency Provider Last Rate Last Dose   • prochlorperazine (COMPAZINE) injection 10 mg  10 mg Intravenous Q6HRS PRN Hugo Benz M.D.   10 mg at 07/29/19 0049   • Pharmacy Consult Request ...Pain Management Review 1 Each  1 Each Other PHARMACY TO DOSE Hugo Benz M.D.        And   • oxyCODONE immediate-release (ROXICODONE) tablet 2.5 mg  2.5 mg Oral Q3HRS PRN Hugo Benz  M.D.        And   • oxyCODONE immediate-release (ROXICODONE) tablet 5 mg  5 mg Oral Q3HRS PRN Hugo Benz M.D.        And   • morphine (pf) 4 mg/ml injection 2 mg  2 mg Intravenous Q4HRS PRN Hugo Benz M.D.   2 mg at 07/28/19 2247   • dextrose 10% and 0.45% NaCl infusion   Intravenous Continuous Michael Gayle M.D. 125 mL/hr at 07/29/19 0059     • MD ALERT-PHARMACY TO CONSULT FOR DKA MONITORING 1 Each  1 Each Other PRN Michael Gayle M.D.       • Adult DKA potassium(K+) replacement scale  1 Each Intravenous Q4HRS Michael Gayle M.D.   Stopped at 07/29/19 0200   • insulin regular human (HUMULIN/NOVOLIN R) 62.5 Units in  mL Infusion for DKA  2 Units/hr Intravenous Continuous Aníbal K Patricia 8 mL/hr at 07/29/19 0700 2 Units/hr at 07/29/19 0700   • omeprazole (PRILOSEC) capsule 20 mg  20 mg Oral DAILY Chris Street M.D.   20 mg at 07/29/19 0515   • senna-docusate (PERICOLACE or SENOKOT S) 8.6-50 MG per tablet 2 Tab  2 Tab Oral BID Chris Street M.D.   2 Tab at 07/29/19 0515    And   • polyethylene glycol/lytes (MIRALAX) PACKET 1 Packet  1 Packet Oral QDAY PRN Chris Street M.D.        And   • magnesium hydroxide (MILK OF MAGNESIA) suspension 30 mL  30 mL Oral QDAY PRN Chris Street M.D.        And   • bisacodyl (DULCOLAX) suppository 10 mg  10 mg Rectal QDAY PRN Chris Street M.D.       • Respiratory Care per Protocol   Nebulization Continuous RT Chris Street M.D.       • enoxaparin (LOVENOX) inj 40 mg  40 mg Subcutaneous DAILY Chris Street M.D.   40 mg at 07/29/19 0515   • acetaminophen (TYLENOL) tablet 650 mg  650 mg Oral Q6HRS PRN Chris Street M.D.       • cloNIDine (CATAPRES) tablet 0.1 mg  0.1 mg Oral Q6HRS PRN Chris Street M.D.       • ondansetron (ZOFRAN) syringe/vial injection 4 mg  4 mg Intravenous Q4HRS PRN Chris Street M.D.       • ondansetron (ZOFRAN ODT) dispertab 4 mg  4 mg Oral Q4HRS PRN Chris Street M.D.       • metoclopramide (REGLAN) injection 10 mg  10 mg  Intravenous Q6HRS Chris Street M.D.   10 mg at 07/29/19 0516       Fluids    Intake/Output Summary (Last 24 hours) at 07/29/19 0732  Last data filed at 07/29/19 0600   Gross per 24 hour   Intake          4236.03 ml   Output             1650 ml   Net          2586.03 ml       Laboratory          Recent Labs      07/27/19   1017   07/27/19   1753   07/28/19   0200   07/28/19   1707  07/28/19   2345  07/29/19   0345   SODIUM  139   < >  141   < >  142   < >  139  141  139   POTASSIUM  4.8   < >  4.1   < >  3.5*   < >  3.2*  3.7  3.6   CHLORIDE  107   < >  114*   < >  114*   < >  113*  114*  113*   CO2  <5*   < >  13*   < >  16*   < >  21  18*  17*   BUN  23*   < >  14   < >  12   < >  7*  5*  5*   CREATININE  0.92   < >  0.62   < >  0.56   < >  0.50  0.46*  0.42*   MAGNESIUM  2.2   --   2.0   --   1.8   --    --    --    --    PHOSPHORUS  5.0*   --   1.7*   --   2.1*   --    --    --    --    CALCIUM  7.9*   < >  7.9*   < >  7.7*   < >  7.7*  7.3*  7.5*    < > = values in this interval not displayed.     Recent Labs      07/27/19   0633 07/28/19   1707 07/28/19   2345 07/29/19   0345   ALTSGPT  26   --    --    --    --    ASTSGOT  26   --    --    --    --    ALKPHOSPHAT  184*   --    --    --    --    TBILIRUBIN  0.5   --    --    --    --    GLUCOSE  944*   < >  99  148*  196*    < > = values in this interval not displayed.     Recent Labs      07/27/19   0633   WBC  18.1*   NEUTSPOLYS  64.90   LYMPHOCYTES  19.80*   MONOCYTES  7.20   EOSINOPHILS  0.90   BASOPHILS  3.60*   ASTSGOT  26   ALTSGPT  26   ALKPHOSPHAT  184*   TBILIRUBIN  0.5     Recent Labs      07/27/19   0633  07/27/19   0925   RBC  4.43   --    HEMOGLOBIN  12.1   --    HEMATOCRIT  41.1   --    PLATELETCT  364   --    PROTHROMBTM   --   16.1*   APTT   --   20.2*   INR   --   1.26*       Imaging  X-Ray:  I have personally reviewed the images and compared with prior images., My impression is: right side port no infiltrates and No film  today    Assessment/Plan  DKA, type 1 (HCC)- (present on admission)   Assessment & Plan    Continue ICU care  Continue IV fluids  I continue to titrate the insulin infusion based on the patient's blood glucose and acid-base status  Continue hourly blood glucose monitoring  Monitor acid-base status and electrolytes every 12 hours  Replete potassium, phosphorus and magnesium based upon electrolyte determinations     Diabetes type I (CMS-HCC)- (present on admission)   Assessment & Plan    Now in DKA     Gastroparesis due to DM (HCC)- (present on admission)   Assessment & Plan    Antiemetics as necessary  Aggressive with reglan, compazine, Zofran, Benadryl -will trial Zyprexa today  Avoid narcotics as much as possible     Hypokalemia- (present on admission)   Assessment & Plan    Replete to maintain greater than 4          VTE:  Lovenox  Ulcer: PPI  Lines: port    I have performed a physical exam and reviewed and updated ROS and Plan today (7/29/2019). In review of yesterday's note (7/28/2019), there are no changes except as documented above.     Titrating insulin infusion to maintain normal glycemia and normal acid-base balance. This patient is critically ill, at high risk for decompensation leading to worsening vital organ dysfunction and death without critical care interventions.    Discussed patient condition and risk of morbidity and/or mortality with Hospitalist, RN, RT, Pharmacy, , Code status disscussed, Charge nurse / hot rounds and Patient.      The patient remains critically ill.  Critical care time = 32 minutes in directly providing and coordinating critical care and extensive data review.  No time overlap and excludes procedures

## 2019-07-29 NOTE — PROGRESS NOTES
Called MD Gomez to bedside to assess Pt's current status in regards to readiness to transition Pt off DKA protocol.

## 2019-07-29 NOTE — CARE PLAN
Problem: Communication  Goal: The ability to communicate needs accurately and effectively will improve  Uses call light appropriatley    Problem: Safety  Goal: Will remain free from injury  Outcome: PROGRESSING AS EXPECTED    Goal: Will remain free from falls  Outcome: PROGRESSING AS EXPECTED      Problem: Bowel/Gastric:  Goal: Normal bowel function is maintained or improved  Outcome: PROGRESSING AS EXPECTED    Goal: Will not experience complications related to bowel motility  Outcome: PROGRESSING AS EXPECTED      Problem: Pain Management  Goal: Pain level will decrease to patient's comfort goal  Outcome: PROGRESSING SLOWER THAN EXPECTED  Compazine ordered for abdominal issues    Problem: Fluid Volume:  Goal: Will maintain balanced intake and output  Outcome: PROGRESSING AS EXPECTED

## 2019-07-29 NOTE — DIETARY
Nutrition Services - Poor po and/or weight loss reported on admission. Malnutrition Screening Tool score <2. No other risk for malnutrition, or other nutrition concerns,  identified on screening. Nutrition assessment not indicated at this time. RD will monitor per department guidelines.

## 2019-07-30 PROBLEM — E83.42 HYPOMAGNESEMIA: Status: ACTIVE | Noted: 2019-01-01

## 2019-07-30 NOTE — PROGRESS NOTES
Paged MD Gonda with updates on BMP and [id number]. Orders received to restart insulin Gtt at 2 units/hr. Give LR bolus and then restart protocol.

## 2019-07-30 NOTE — DIETARY
Nutrition Services: Inadequate nutrition x3 days. Pt remains on a clear liquid diet. Ongoing N/v discussed in Rounds. RD monitoring for diet advancement with adequate intake.

## 2019-07-30 NOTE — PROGRESS NOTES
"Paged MD Gonda regarding insuline orders. Orders received to \"leave insuline gtt off and update with next BMP.\"  "

## 2019-07-30 NOTE — PROGRESS NOTES
Critical Care Progress Note    Date of admission  7/27/2019    Chief Complaint  28 y.o. female admitted 7/27/2019 with history of type 1 diabetes mellitus, pyloric stenosis and gastroparesis.  She has had multiple recent admissions for DKA.  She was just here at [facility] from on or about 7/17/2019 to 7/22/2019 with an episode of DKA.  She comes into the emergency room this morning complaining of epigastric pain with nausea and vomiting as well as hyperglycemia which began this morning.  She denies fever, chills or sweats.  She denies diarrhea.  She has a dry cough.  She has no sputum production or hemoptysis.  She has no flank pain, dysuria, urgency, frequency or hematuria.  She tells me that she has been compliant with taking her insulin and has not missed any doses. Taken from Dr Gayle note.     Hospital Course  Admit to ICU 7/27     Interval Problem Update  Reviewed last 24 hour events:   - worsened hyperglycemia on insulin gtt   - Neuro: AOx4   - HR: 90s-120s   - SBP: 100s-140s   - GI: emesis improved, tolerated a small amount of food   - UOP: adequate   - Pitt: no   - Tm: 37.1   - Lines: port   - PPx: GI PPI, DVT lovenox   - RA   - CXR (personally reviewed and compared to prior): no new    Yesterday   - on insulin gtt   - Neuro: AOx4   - HR: NSR 90s-100s   - SBP: 90s-140s   - GI: NPO, emesis   - UOP: 1.5L/24 hrs   - Pitt: no   - Tm: afeb   - Lines: port   - PPx: GI PPI - home, DVT lovenox   - RA   - CXR (personally reviewed and compared to prior): no new    Review of Systems  Review of Systems   Constitutional: Negative for chills and fever.   Respiratory: Negative for cough, sputum production, shortness of breath and stridor.    Cardiovascular: Negative for chest pain.   Gastrointestinal: Positive for abdominal pain, nausea and vomiting.   Genitourinary: Negative for dysuria.   Musculoskeletal: Negative for myalgias.   Skin: Negative for rash.   Neurological: Negative for dizziness, sensory change and  focal weakness.        Vital Signs for last 24 hours   Temp:  [36.3 °C (97.4 °F)-37.1 °C (98.7 °F)] 37.1 °C (98.7 °F)  Pulse:  [] 113  Resp:  [12-28] 16  SpO2:  [96 %-99 %] 98 %    Hemodynamic parameters for last 24 hours       Respiratory Information for the last 24 hours       Physical Exam   Physical Exam   Constitutional: She is oriented to person, place, and time. She appears well-developed and well-nourished. She appears distressed.   HENT:   Head: Normocephalic and atraumatic.   Right Ear: External ear normal.   Left Ear: External ear normal.   Nose: Nose normal.   dry   Eyes: Conjunctivae and EOM are normal.   Neck: Neck supple. No JVD present. No thyromegaly present.   Cardiovascular: Normal rate, regular rhythm and normal heart sounds.    No murmur heard.  Pulmonary/Chest: Effort normal and breath sounds normal. No respiratory distress. She has no wheezes. She has no rales.   Right sided chest port accessed   Abdominal: Soft. Bowel sounds are normal. She exhibits no distension. There is tenderness. There is no rebound and no guarding.   Musculoskeletal: Normal range of motion. She exhibits no edema or tenderness.   Neurological: She is alert and oriented to person, place, and time. No cranial nerve deficit. Coordination normal.   Moves all extremities, AOx4 answers appropriate   Skin: Skin is warm and dry. She is not diaphoretic.   Psychiatric: She has a normal mood and affect.   Nursing note and vitals reviewed.      Medications  Current Facility-Administered Medications   Medication Dose Route Frequency Provider Last Rate Last Dose   • insulin regular (HUMULIN R) injection 0-14 Units  0-14 Units Intravenous Once Cholo Alan Jr., D.O.   Stopped at 07/29/19 0930   • insulin regular human (HUMULIN/NOVOLIN R) 62.5 Units in  mL infusion per protocol  0-29 Units/hr Intravenous Continuous Cholo Alan Jr. D.O. 4 mL/hr at 07/30/19 0704 1 Units/hr at 07/30/19 0704   • DEXTROSE 10% BOLUS  125-250 mL  125-250 mL Intravenous PRN Cholo Alan Jr., D.O.       • lactated ringers infusion   Intravenous Continuous Cholo Alan Jr., D.O. 125 mL/hr at 07/30/19 0700     • diphenhydrAMINE (BENADRYL) injection 25 mg  25 mg Intravenous Q6HRS PRN Cholo Alan Jr., D.O.       • prochlorperazine (COMPAZINE) injection 10 mg  10 mg Intravenous Q6HRS PRN Hugo Benz M.D.   10 mg at 07/30/19 0208   • Pharmacy Consult Request ...Pain Management Review 1 Each  1 Each Other PHARMACY TO DOSE Hugo Benz M.D.        And   • oxyCODONE immediate-release (ROXICODONE) tablet 2.5 mg  2.5 mg Oral Q3HRS PRN Hugo Benz M.D.        And   • oxyCODONE immediate-release (ROXICODONE) tablet 5 mg  5 mg Oral Q3HRS PRN Hugo Benz M.D.        And   • morphine (pf) 4 mg/ml injection 2 mg  2 mg Intravenous Q4HRS PRN Hugo Benz M.D.   2 mg at 07/30/19 0207   • omeprazole (PRILOSEC) capsule 20 mg  20 mg Oral DAILY Chris Street M.D.   20 mg at 07/29/19 0515   • senna-docusate (PERICOLACE or SENOKOT S) 8.6-50 MG per tablet 2 Tab  2 Tab Oral BID Chris Street M.D.   Stopped at 07/29/19 1800    And   • polyethylene glycol/lytes (MIRALAX) PACKET 1 Packet  1 Packet Oral QDAY PRN Chris Street M.D.        And   • magnesium hydroxide (MILK OF MAGNESIA) suspension 30 mL  30 mL Oral QDAY PRN Chris Street M.D.        And   • bisacodyl (DULCOLAX) suppository 10 mg  10 mg Rectal QDAY PRN Chris Street M.D.       • Respiratory Care per Protocol   Nebulization Continuous RT Chris Street M.D.       • enoxaparin (LOVENOX) inj 40 mg  40 mg Subcutaneous DAILY Chris Street M.D.   40 mg at 07/30/19 0623   • acetaminophen (TYLENOL) tablet 650 mg  650 mg Oral Q6HRS PRN Chris Street M.D.       • cloNIDine (CATAPRES) tablet 0.1 mg  0.1 mg Oral Q6HRS PRN Chris Street M.D.       • ondansetron (ZOFRAN) syringe/vial injection 4 mg  4 mg Intravenous Q4HRS PRN Chris Street M.D.       • ondansetron (ZOFRAN ODT) dispertab 4 mg  4 mg Oral  Q4HRS PRN Chris Street M.D.       • metoclopramide (REGLAN) injection 10 mg  10 mg Intravenous Q6HRS Chris Street M.D.   10 mg at 07/30/19 0623       Fluids    Intake/Output Summary (Last 24 hours) at 07/30/19 0738  Last data filed at 07/30/19 0600   Gross per 24 hour   Intake          2136.83 ml   Output             3110 ml   Net          -973.17 ml       Laboratory          Recent Labs      07/27/19   1017   07/27/19   1753   07/28/19   0200   07/29/19   0345  07/29/19   0756  07/29/19   2102   SODIUM  139   < >  141   < >  142   < >  139  139  136   POTASSIUM  4.8   < >  4.1   < >  3.5*   < >  3.6  3.9  4.6   CHLORIDE  107   < >  114*   < >  114*   < >  113*  113*  107   CO2  <5*   < >  13*   < >  16*   < >  17*  17*  15*   BUN  23*   < >  14   < >  12   < >  5*  4*  4*   CREATININE  0.92   < >  0.62   < >  0.56   < >  0.42*  0.49*  0.52   MAGNESIUM  2.2   --   2.0   --   1.8   --    --    --    --    PHOSPHORUS  5.0*   --   1.7*   --   2.1*   --    --    --    --    CALCIUM  7.9*   < >  7.9*   < >  7.7*   < >  7.5*  7.7*  7.9*    < > = values in this interval not displayed.     Recent Labs      07/29/19   0345 07/29/19   0756 07/29/19   2102   GLUCOSE  196*  192*  280*     Recent Labs      07/30/19   0445   WBC  6.3   NEUTSPOLYS  63.40   LYMPHOCYTES  24.80   MONOCYTES  9.70   EOSINOPHILS  0.80   BASOPHILS  0.50     Recent Labs      07/27/19   0925  07/30/19   0445   RBC   --   3.54*   HEMOGLOBIN   --   9.6*   HEMATOCRIT   --   29.8*   PLATELETCT   --   212   PROTHROMBTM  16.1*   --    APTT  20.2*   --    INR  1.26*   --        Imaging  X-Ray:  I have personally reviewed the images and compared with prior images., My impression is: right side port no infiltrates and No film today    Assessment/Plan  DKA, type 1 (HCC)- (present on admission)   Assessment & Plan    Continue ICU care  Continue IV fluids  I am continuing to titrate the insulin infusion based on the patient's blood glucose and acid-base  status  Continue hourly blood glucose monitoring  Monitor acid-base status and electrolytes every 12 hours  Replete potassium, phosphorus and magnesium based upon electrolyte determinations     Diabetes type I (CMS-HCC)- (present on admission)   Assessment & Plan    Now in DKA     Gastroparesis due to DM ([facility])- (present on admission)   Assessment & Plan    Antiemetics as necessary  Aggressive with Reglan, Compazine, Zofran, Benadryl, Haldol, Capsaicin  Avoid narcotics as much as possible     Hypokalemia- (present on admission)   Assessment & Plan    Replete to maintain >4     Hypomagnesemia   Assessment & Plan    Replete to maintain >2          VTE:  Lovenox  Ulcer: PPI  Lines: port    I have performed a physical exam and reviewed and updated ROS and Plan today (7/30/2019). In review of yesterday's note (7/29/2019), there are no changes except as documented above.     Titrating insulin gtt, addressing metabolic acidosis. This patient is critically ill, at high risk for decompensation leading to worsening vital organ dysfunction and death without critical care interventions.    Discussed patient condition and risk of morbidity and/or mortality with Hospitalist, RN, RT, Pharmacy, , Code status disscussed, Charge nurse / hot rounds and Patient.      The patient remains critically ill.  Critical care time = 33 minutes in directly providing and coordinating critical care and extensive data review.  No time overlap and excludes procedures

## 2019-07-30 NOTE — CARE PLAN
Problem: Fluid Volume:  Goal: Will maintain balanced intake and output  IV fluids running per order.     Problem: Mobility  Goal: Risk for activity intolerance will decrease  Pt encouraged to mobilize to BSC.

## 2019-07-30 NOTE — PROGRESS NOTES
12 hour chart check. Assumed care of patient at 0700.Received bedside report from CAIT Chadwick. Patient comfortable in bed with no needs or concerns at this time. Bed alarm set, call light within reach, bed in lowest position, treaded slipper socks on. See flowsheets for VS. Monitor ranges appropriate. Patient's pain is 8 / 10. No family at bed side. Will continue to monitor closely.     Drips verified.

## 2019-07-30 NOTE — PROGRESS NOTES
Hospital Medicine Daily Progress Note    Date of Service  7/30/2019    Chief Complaint  28 y.o. female admitted 7/27/2019 with N/V    Hospital Course    Hx TIDM with multiple admissions for DKA.  Presented with N/V and abd pain as she has on previous occasions and admitted to the CICU with Dx of DKA      Interval Problem Update  Still having N/V.  Some abd pain, worse when she vomits.  Feels a little less pain then yesterday, V is about the same.  Pain in epigastrium, burning and cramping in nature  Sinus/sinsu tach  Insulin 1-1.5 drip  Some emesis this am  UOP 1800ml/12hrs    Consultants/Specialty  pulmonology    Code Status  full    Disposition  OK to floor    Review of Systems  Review of Systems   Constitutional: Negative for chills and fever.   HENT: Negative for nosebleeds and sore throat.    Eyes: Negative for blurred vision and double vision.   Respiratory: Negative for cough and shortness of breath.    Cardiovascular: Negative for chest pain, palpitations and leg swelling.   Gastrointestinal: Positive for abdominal pain, nausea and vomiting. Negative for blood in stool, diarrhea and melena.   Genitourinary: Negative for dysuria and urgency.   Musculoskeletal: Negative for back pain.   Skin: Negative for rash.   Neurological: Negative for dizziness, loss of consciousness and headaches.        Physical Exam  Temp:  [36.3 °C (97.4 °F)-36.7 °C (98.1 °F)] 36.7 °C (98.1 °F)  Pulse:  [] 119  Resp:  [12-28] 20  SpO2:  [96 %-99 %] 97 %    Physical Exam   Constitutional: She is oriented to person, place, and time. She appears well-developed and well-nourished. No distress.   HENT:   Head: Normocephalic and atraumatic.   Nose: Nose normal.   Mouth/Throat: Oropharynx is clear and moist.   Eyes: Conjunctivae are normal. No scleral icterus.   Neck: No JVD present.   Cardiovascular: Normal rate and regular rhythm.    Pulmonary/Chest: Effort normal. No stridor. No respiratory distress. She has no wheezes. She has no  rales.   Abdominal: Soft. Bowel sounds are normal. She exhibits no distension and no mass. There is tenderness. There is no rebound and no guarding.   Musculoskeletal: She exhibits no edema.   Neurological: She is alert and oriented to person, place, and time.   Skin: Skin is warm and dry. No rash noted. She is not diaphoretic.   Psychiatric: She has a normal mood and affect. Thought content normal.   Nursing note and vitals reviewed.      Fluids    Intake/Output Summary (Last 24 hours) at 07/30/19 0543  Last data filed at 07/30/19 0200   Gross per 24 hour   Intake          1929.27 ml   Output             3710 ml   Net         -1780.73 ml       Laboratory  Recent Labs      07/27/19   0633   WBC  18.1*   RBC  4.43   HEMOGLOBIN  12.1   HEMATOCRIT  41.1   MCV  92.8   MCH  27.3   MCHC  29.4*   RDW  52.2*   PLATELETCT  364   MPV  11.0     Recent Labs      07/29/19   0345  07/29/19   0756  07/29/19   2102   SODIUM  139  139  136   POTASSIUM  3.6  3.9  4.6   CHLORIDE  113*  113*  107   CO2  17*  17*  15*   GLUCOSE  196*  192*  280*   BUN  5*  4*  4*   CREATININE  0.42*  0.49*  0.52   CALCIUM  7.5*  7.7*  7.9*     Recent Labs      07/27/19   0925   APTT  20.2*   INR  1.26*               Imaging  [id number] VIEW   Final Result      1.  Bowel gas pattern is nonobstructive. There is a large amount of colonic stool.      DX-CHEST-LIMITED (1 VIEW)   Final Result      1.  No acute cardiac or pulmonary abnormalities are identified.           Assessment/Plan  DKA, type 1 (HCC)- (present on admission)   Assessment & Plan    Patient presents with recurrent DKA, trigger unclear, query non-compliance or severe gastroparesis at home   Her abdominal symptoms are slow to resolve, this is typical for her.  She is improving albeit slowly  Continue to titrate insulin drip but transition to 180 protocol  Titrate drip to serial POC glucose checks and serial BMP's to confirm she is not decomensating again       Diabetes type I (CMS-HCC)-  (present on admission)   Assessment & Plan    Uncontrolled with frequent episodes of DKA     Gastroparesis due to DM (HCC)- (present on admission)   Assessment & Plan    Correct blood sugars  IV anti-emetics  Trial Atypical antipsychotic     Hypokalemia- (present on admission)   Assessment & Plan    Replace IV as not able to tolerate po  Check Mg  Repeat labs ordered to follow up response to treatment     Hypomagnesemia   Assessment & Plan    Give 2g IV  Repeat Mg tomorrow am          VTE prophylaxis: enoxaparin

## 2019-07-30 NOTE — CARE PLAN
Problem: Nutritional:  Goal: Achieve adequate nutritional intake  Patient will tolerate diet >Clear liquids and consume at least 50% of meals.  Outcome: NOT MET

## 2019-07-31 NOTE — PROGRESS NOTES
12 hour chart check. Assumed care of patient at 0700. Received bedside report from CAIT Álvarez. Patient comfortable in bed with no needs or concerns at this time. Bed alarm set, call light within reach, bed in lowest position, treaded slipper socks on. See flowsheets for VS. Monitor ranges appropriate. Patient's pain is 0 / 10. No family at bed side. Will continue to monitor closely.     Drip verified.

## 2019-07-31 NOTE — PROGRESS NOTES
Patient transferred to [facility] via transport with one bag of personal items. See flowsheets for VS. Patient alert and oriented x4. Patient ambulated to bathroom without difficulty. See I & O. Patient left with LR and k-phos running. Please see MAR.Patient mentioned small amount of stomach pain when transferring and was okay with waiting for narcotic. Transferring [place] received report had no questions and was updated regarding patient's pain status.

## 2019-07-31 NOTE — PROGRESS NOTES
Received pt from [facility] via transport after receiving report from Tamanna CHAVIRA. Pt ambulated from transporting bed to hospital with minimal assistance. This RN is in agreement with assessment done at 0800 7/31. 2 RN Skin assessment was completed. Pt reported pain and was medicated. Bed is locked and in lowest position, call light and table is within reach. Hourly rounding is in place.

## 2019-07-31 NOTE — PROGRESS NOTES
Hospital Medicine Daily Progress Note    Date of Service  7/31/2019    Chief Complaint  28 y.o. female admitted 7/27/2019 with N/V    Hospital Course    Hx TIDM with multiple admissions for DKA.  Presented with N/V and abd pain as she has on previous occasions and admitted to the CICU with Dx of DKA      Interval Problem Update  Still having N/V.  Some abd pain, worse when she vomits.  Feels a little less pain then yesterday, V is about the same.  Pain in epigastrium, burning and cramping in nature  Sinus 90-110s  -140s  Some emesis this am  UOP 1800ml/12hrs    Consultants/Specialty  pulmonology    Code Status  full    Disposition  OK to floor    Review of Systems  Review of Systems   Constitutional: Negative for chills and fever.   HENT: Negative for nosebleeds and sore throat.    Eyes: Negative for blurred vision and double vision.   Respiratory: Negative for cough and shortness of breath.    Cardiovascular: Negative for chest pain, palpitations and leg swelling.   Gastrointestinal: Positive for abdominal pain, nausea and vomiting. Negative for blood in stool, diarrhea and melena.   Genitourinary: Negative for dysuria and urgency.   Musculoskeletal: Negative for back pain.   Skin: Negative for rash.   Neurological: Negative for dizziness, loss of consciousness and headaches.        Physical Exam  Temp:  [36.8 °C (98.2 °F)-37.2 °C (99 °F)] 36.8 °C (98.2 °F)  Pulse:  [0-122] 101  Resp:  [16-29] 21  BP: (146)/(91) 146/91  SpO2:  [93 %-98 %] 93 %    Physical Exam   Constitutional: She is oriented to person, place, and time. She appears well-developed and well-nourished. No distress.   HENT:   Head: Normocephalic and atraumatic.   Nose: Nose normal.   Mouth/Throat: Oropharynx is clear and moist.   Eyes: Conjunctivae are normal. No scleral icterus.   Neck: No JVD present.   Cardiovascular: Normal rate and regular rhythm.   Pulmonary/Chest: Effort normal. No stridor. No respiratory distress. She has no wheezes.  She has no rales.   Abdominal: Soft. Bowel sounds are normal. She exhibits no distension and no mass. There is tenderness. There is no rebound and no guarding.   Musculoskeletal: She exhibits no edema.   Neurological: She is alert and oriented to person, place, and time.   Skin: Skin is warm and dry. No rash noted. She is not diaphoretic.   Psychiatric: She has a normal mood and affect. Thought content normal.   Nursing note and vitals reviewed.      Fluids    Intake/Output Summary (Last 24 hours) at 7/31/2019 0629  Last data filed at 7/31/2019 0000  Gross per 24 hour   Intake 2704.77 ml   Output 1350 ml   Net 1354.77 ml       Laboratory  Recent Labs     07/30/19  0445 07/31/19  0425   WBC 6.3 4.3*   RBC 3.54* 3.38*   HEMOGLOBIN 9.6* 8.9*   HEMATOCRIT 29.8* 28.3*   MCV 83.9 83.7   MCH 26.8* 26.3*   MCHC 32.0* 31.4*   RDW 47.1 47.2   PLATELETCT 212 187   MPV 9.8 9.3     Recent Labs     07/29/19  2102 07/30/19  0818 07/30/19  2045   SODIUM 136 137 134*   POTASSIUM 4.6 3.8 3.9   CHLORIDE 107 106 104   CO2 15* 21 22   GLUCOSE 280* 181* 265*   BUN 4* 4* 4*   CREATININE 0.52 0.41* 0.43*   CALCIUM 7.9* 7.8* 7.5*                   Imaging  [id number] VIEW   Final Result      1.  Bowel gas pattern is nonobstructive. There is a large amount of colonic stool.      DX-CHEST-LIMITED (1 VIEW)   Final Result      1.  No acute cardiac or pulmonary abnormalities are identified.           Assessment/Plan  Hypomagnesemia  Assessment & Plan  Give 2g IV  Repeat Mg tomorrow am    Gastroparesis due to DM (HCC)- (present on admission)  Assessment & Plan  Correct blood sugars  IV anti-emetics  Trial Atypical antipsychotic    DKA, type 1 (HCC)- (present on admission)  Assessment & Plan  Patient presents with recurrent DKA, trigger unclear, query non-compliance or severe gastroparesis at home   Her abdominal symptoms are slow to resolve, this is typical for her.  She is improving albeit slowly  Continue to titrate insulin drip but  transition to 180 protocol  Titrate drip to serial POC glucose checks and serial BMP's to confirm she is not decomensating again      Hypokalemia- (present on admission)  Assessment & Plan  Replace IV as not able to tolerate po  Check Mg  Repeat labs ordered to follow up response to treatment    Diabetes type I (CMS-HCC)- (present on admission)  Assessment & Plan  Uncontrolled with frequent episodes of DKA       VTE prophylaxis: enoxaparin

## 2019-07-31 NOTE — PROGRESS NOTES
Critical Care Progress Note    Date of admission  7/27/2019    Chief Complaint  28 y.o. female admitted 7/27/2019 with history of type 1 diabetes mellitus, pyloric stenosis and gastroparesis.  She has had multiple recent admissions for DKA.  She was just here at [facility] from on or about 7/17/2019 to 7/22/2019 with an episode of DKA.  She comes into the emergency room this morning complaining of epigastric pain with nausea and vomiting as well as hyperglycemia which began this morning.  She denies fever, chills or sweats.  She denies diarrhea.  She has a dry cough.  She has no sputum production or hemoptysis.  She has no flank pain, dysuria, urgency, frequency or hematuria.  She tells me that she has been compliant with taking her insulin and has not missed any doses. Taken from Dr Gayle note.     Hospital Course  Admit to ICU 7/27     Interval Problem Update  Reviewed last 24 hour events:   - transitioned to SSI, lantus   - Neuro: AOx4   - HR: 90s-100s   - SBP: 100s-130s   - GI: CLD, emesis improved with haldol   - UOP: 1.8L/24 hrs   - Pitt: no   - Tm: afeb   - Lines: PIV, PORT   - PPx: GI PPI, DVT lovenox   - RA   - CXR (personally reviewed and compared to prior): no new   - Replete Mg, phos    Yesterday   - worsened hyperglycemia on insulin gtt   - Neuro: AOx4   - HR: 90s-120s   - SBP: 100s-140s   - GI: emesis improved, tolerated a small amount of food   - UOP: adequate   - Pitt: no   - Tm: 37.1   - Lines: port   - PPx: GI PPI, DVT lovenox   - RA   - CXR (personally reviewed and compared to prior): no new    Review of Systems  Review of Systems   Constitutional: Negative for chills and fever.   Respiratory: Negative for cough, sputum production, shortness of breath and stridor.    Cardiovascular: Negative for chest pain.   Gastrointestinal: Positive for abdominal pain, nausea (improved) and vomiting.   Genitourinary: Negative for dysuria.   Musculoskeletal: Negative for myalgias.   Skin: Negative for  rash.   Neurological: Negative for dizziness, sensory change and focal weakness.        Vital Signs for last 24 hours   Temp:  [36.8 °C (98.2 °F)-37.2 °C (99 °F)] 36.8 °C (98.2 °F)  Pulse:  [0-122] 101  Resp:  [16-29] 21  BP: (146)/(91) 146/91  SpO2:  [93 %-98 %] 93 %    Hemodynamic parameters for last 24 hours       Respiratory Information for the last 24 hours       Physical Exam   Physical Exam   Constitutional: She is oriented to person, place, and time. She appears well-developed and well-nourished. No distress.   HENT:   Head: Normocephalic and atraumatic.   Right Ear: External ear normal.   Left Ear: External ear normal.   Nose: Nose normal.   dry   Eyes: Conjunctivae and EOM are normal.   Neck: Neck supple. No JVD present. No thyromegaly present.   Cardiovascular: Regular rhythm and normal heart sounds. Tachycardia present.   No murmur heard.  Pulmonary/Chest: Effort normal and breath sounds normal. No respiratory distress. She has no wheezes. She has no rales.   Right sided chest port accessed   Abdominal: Soft. Bowel sounds are normal. She exhibits no distension. There is tenderness. There is no rebound and no guarding.   Musculoskeletal: Normal range of motion. She exhibits no edema or tenderness.   Neurological: She is alert and oriented to person, place, and time. No cranial nerve deficit. Coordination normal.   Moves all extremities, AOx4 answers appropriate   Skin: Skin is warm and dry. She is not diaphoretic.   Psychiatric: She has a normal mood and affect.   Nursing note and vitals reviewed.      Medications  Current Facility-Administered Medications   Medication Dose Route Frequency Provider Last Rate Last Dose   • haloperidol lactate (HALDOL) injection 2 mg  2 mg Intravenous Q4HRS PRN LISSETTE TerryOHollis   2 mg at 07/30/19 2211   • capsaicin (ZOSTRIX) 0.025 % cream   Topical TID PRN Cholo Alan Jr., D.O.       • insulin glargine (LANTUS) injection 20 Units  20 Units Subcutaneous Q  EVENING PUALO Terry.OHollis   20 Units at 07/30/19 1707   • insulin regular (HUMULIN R) injection 3-14 Units  3-14 Units Subcutaneous 4X/DAY ACHS Fermin Gloria D.O.   7 Units at 07/30/19 2039    And   • glucose 4 g chewable tablet 16 g  16 g Oral Q15 MIN PRN PAULO Terry.OHollis        And   • DEXTROSE 10% BOLUS 250 mL  250 mL Intravenous Q15 MIN PRN Fermin Gloria D.O.       • lactated ringers infusion   Intravenous Continuous Cholo Alan Jr., D.O. 125 mL/hr at 07/30/19 2218     • diphenhydrAMINE (BENADRYL) injection 25 mg  25 mg Intravenous Q6HRS PRN Cholo Alan Jr., D.O.       • prochlorperazine (COMPAZINE) injection 10 mg  10 mg Intravenous Q6HRS PRN Hugo Benz M.D.   10 mg at 07/30/19 0848   • Pharmacy Consult Request ...Pain Management Review 1 Each  1 Each Other PHARMACY TO DOSE Hugo Benz M.D.        And   • oxyCODONE immediate-release (ROXICODONE) tablet 2.5 mg  2.5 mg Oral Q3HRS PRN Hugo Benz M.D.        And   • oxyCODONE immediate-release (ROXICODONE) tablet 5 mg  5 mg Oral Q3HRS PRN Hugo Benz M.D.        And   • morphine (pf) 4 mg/ml injection 2 mg  2 mg Intravenous Q4HRS PRN Hugo Benz M.D.   2 mg at 07/30/19 2047   • omeprazole (PRILOSEC) capsule 20 mg  20 mg Oral DAILY Chris Street M.D.   20 mg at 07/29/19 0515   • senna-docusate (PERICOLACE or SENOKOT S) 8.6-50 MG per tablet 2 Tab  2 Tab Oral BID Chris Street M.D.   2 Tab at 07/30/19 1712    And   • polyethylene glycol/lytes (MIRALAX) PACKET 1 Packet  1 Packet Oral QDAY PRN Chris Street M.D.        And   • magnesium hydroxide (MILK OF MAGNESIA) suspension 30 mL  30 mL Oral QDAY PRN Chris Street M.D.        And   • bisacodyl (DULCOLAX) suppository 10 mg  10 mg Rectal QDAY PRN Chris Street M.D.       • Respiratory Care per Protocol   Nebulization Continuous RT Chris Street M.D.       • enoxaparin (LOVENOX) inj 40 mg  40 mg Subcutaneous DAILY Chris Street M.D.   40 mg at  07/30/19 0623   • acetaminophen (TYLENOL) tablet 650 mg  650 mg Oral Q6HRS PRN Chris Street M.D.       • cloNIDine (CATAPRES) tablet 0.1 mg  0.1 mg Oral Q6HRS PRN Chris Street M.D.       • ondansetron (ZOFRAN) syringe/vial injection 4 mg  4 mg Intravenous Q4HRS PRN Chris Street M.D.       • ondansetron (ZOFRAN ODT) dispertab 4 mg  4 mg Oral Q4HRS PRN Chris Street M.D.       • metoclopramide (REGLAN) injection 10 mg  10 mg Intravenous Q6HRS Chris Street M.D.   10 mg at 07/30/19 2315       Fluids    Intake/Output Summary (Last 24 hours) at 7/31/2019 0745  Last data filed at 7/31/2019 0400  Gross per 24 hour   Intake 3204.77 ml   Output 1850 ml   Net 1354.77 ml       Laboratory          Recent Labs     07/29/19 2102 07/30/19 0818 07/30/19 2045 07/31/19  0425   SODIUM 136 137 134*  --    POTASSIUM 4.6 3.8 3.9  --    CHLORIDE 107 106 104  --    CO2 15* 21 22  --    BUN 4* 4* 4*  --    CREATININE 0.52 0.41* 0.43*  --    MAGNESIUM  --  1.6  --  1.8   PHOSPHORUS  --  1.9*  --  2.3*   CALCIUM 7.9* 7.8* 7.5*  --      Recent Labs     07/29/19 2102 07/30/19 0818 07/30/19 2045   GLUCOSE 280* 181* 265*     Recent Labs     07/30/19 0445 07/31/19  0425   WBC 6.3 4.3*   NEUTSPOLYS 63.40 46.70   LYMPHOCYTES 24.80 40.40   MONOCYTES 9.70 8.90   EOSINOPHILS 0.80 3.00   BASOPHILS 0.50 0.50     Recent Labs     07/30/19 0445 07/31/19  0425   RBC 3.54* 3.38*   HEMOGLOBIN 9.6* 8.9*   HEMATOCRIT 29.8* 28.3*   PLATELETCT 212 187       Imaging  X-Ray:  I have personally reviewed the images and compared with prior images., My impression is: right side port no infiltrates and No film today    Assessment/Plan  Hypomagnesemia  Assessment & Plan  Continue repletion to maintain >2    Gastroparesis due to DM (HCC)- (present on admission)  Assessment & Plan  Antiemetics as necessary  Reglan, Compazine, Zofran, Benadryl, Haldol, Capsaicin all available if needed  Avoid narcotics as much as possible    DKA, type 1 (HCC)- (present on  admission)  Assessment & Plan  Goal blood glucose 120-180  Lantus 20 units nightly, sliding scale insulin, accuchecks  hypoglycemia protocol  A1c 12.6 4 months ago      Hypokalemia- (present on admission)  Assessment & Plan  Continue repletion to maintain >4    Diabetes type I (CMS-HCC)- (present on admission)  Assessment & Plan  Now out of DKA       VTE:  Lovenox  Ulcer: PPI  Lines: port    I have performed a physical exam and reviewed and updated ROS and Plan today (7/31/2019). In review of yesterday's note (7/30/2019), there are no changes except as documented above.     Discussed patient condition and risk of morbidity and/or mortality with Hospitalist, RN, RT, Pharmacy, , Code status disscussed, Charge nurse / hot rounds and Patient.

## 2019-07-31 NOTE — PROGRESS NOTES
· 2 RN skin check complete with Lara CHAVIRA      · Devices in place: right sided chest port, PIV left forearm   · Skin assessed under devices: Yes  · Noted: a heeled scar right hairline from pts dog  · The following interventions are in place: encourage pt to turn in bed, skin moisturizer, pressure redistribution mattress

## 2019-08-01 PROBLEM — E83.42 HYPOMAGNESEMIA: Status: RESOLVED | Noted: 2019-01-01 | Resolved: 2019-01-01

## 2019-08-01 PROBLEM — D64.9 NORMOCYTIC ANEMIA: Status: ACTIVE | Noted: 2019-01-01

## 2019-08-01 NOTE — DISCHARGE PLANNING
Medicaid Meds-to-Beds: Discharge prescription orders listed below delivered to patient's bedside. Patient counseled. Medications bagged with bedside RN and will be checked into central pharmacy. Provider approved change of omeprazole to tablets per insurance coverage.  Patient's Ademelog will process on 8/11/19 and Basaglar will process on 8/24/19. Patient notified. Patient states her mother recently picked up the insulin at the pharmacy and has these at home. Patient agreeable to community care management services. She follows with the [facility].      Alis Sparks   Home Medication Instructions MELISSA:[id number]    Printed on:08/01/19 2803   Medication Information                      ascorbic acid (VITAMIN C) 500 MG tablet  Take 1 Tab by mouth every day.             ferrous sulfate 325 (65 Fe) MG tablet  Take 1 Tab by mouth every day.             Insulin Pen Needle (PEN NEEDLES) 32G X 4 MM Misc  100 Units by Does not apply route 3 times a day.             metoclopramide (REGLAN) 10 MG Tab  Take 1 Tab by mouth 4 times a day.             omeprazole (PRILOSEC) 20 MG delayed-release tablet  Take 1 tab by mouth every day.             ondansetron (ZOFRAN ODT) 4 MG TABLET DISPERSIBLE  Take 1 Tab by mouth every four hours as needed for Nausea (give PO if no IV route available).             SUMAtriptan (IMITREX) 50 MG Tab  Take 1 Tab by mouth Once PRN for Migraine. Can repeat in 2 hrs if needed                 Laure Burnham, PharmD

## 2019-08-01 NOTE — PROGRESS NOTES
[facility] Medicine Daily Progress Note    Date of Service  8/1/2019    Chief Complaint  28 y.o. female admitted 7/27/2019 with abdominal pain, hyperglycemia     Hospital Course    28-year-old female past medical history of type 1 diabetes mellitus, recurrent hospitalization with DKA, recent discharge July 22, 2019 for DKA was admitted again for DKA.  On admission she had no source of infection.  She was admitted in critical care unit for intensive care of DKA which was resolved with proper management of IV insulin drip.  Compliance of medication is questionable.       Interval Problem Update  Feeling sleepy. Still sometimes nauseated. No complains. No diarrhea, fever. Eating 50 % of meals.     Consultants/Specialty  Critical care physician     Code Status  Full code     Disposition  Inpatient     Review of Systems  Review of Systems   Constitutional: Positive for malaise/fatigue. Negative for chills, fever and weight loss.   HENT: Negative for hearing loss and sore throat.    Respiratory: Negative for cough and shortness of breath.    Cardiovascular: Negative for chest pain, palpitations, orthopnea and leg swelling.   Gastrointestinal: Positive for nausea. Negative for abdominal pain, blood in stool, diarrhea and vomiting.   Genitourinary: Negative for dysuria, frequency and urgency.   Musculoskeletal: Negative for myalgias.   Neurological: Negative for dizziness and headaches.        Physical Exam  Temp:  [36.3 °C (97.3 °F)-36.7 °C (98 °F)] 36.7 °C (98 °F)  Pulse:  [] 88  Resp:  [17-20] 17  BP: (117-137)/(78-95) 134/90  SpO2:  [90 %-98 %] 92 %    Physical Exam   Constitutional: She is oriented to person, place, and time. She appears well-developed and well-nourished.   HENT:   Head: Normocephalic and atraumatic.   Neck: Normal range of motion.   Cardiovascular: Normal rate and regular rhythm.   No murmur heard.  Pulmonary/Chest: Effort normal and breath sounds normal. No respiratory distress. She has no  wheezes. She has no rales.   Abdominal: Soft. Bowel sounds are normal. She exhibits no distension. There is no tenderness.   Musculoskeletal: Normal range of motion. She exhibits no edema.   Neurological: She is alert and oriented to person, place, and time.   Skin: No rash noted. No erythema.   Psychiatric: She has a normal mood and affect. Her behavior is normal.       Fluids    Intake/Output Summary (Last 24 hours) at 8/1/2019 1254  Last data filed at 7/31/2019 1905  Gross per 24 hour   Intake 240 ml   Output 1000 ml   Net -760 ml       Laboratory  Recent Labs     07/30/19  0445 07/31/19  0425 08/01/19  0350   WBC 6.3 4.3* 4.1*   RBC 3.54* 3.38* 3.28*   HEMOGLOBIN 9.6* 8.9* 8.6*   HEMATOCRIT 29.8* 28.3* 27.4*   MCV 83.9 83.7 83.5   MCH 26.8* 26.3* 26.2*   MCHC 32.0* 31.4* 31.4*   RDW 47.1 47.2 46.7   PLATELETCT 212 187 198   MPV 9.8 9.3 9.7     Recent Labs     07/30/19  0818 07/30/19  2045 08/01/19  0350   SODIUM 137 134* 141   POTASSIUM 3.8 3.9 4.0   CHLORIDE 106 104 109   CO2 21 22 24   GLUCOSE 181* 265* 171*   BUN 4* 4* 3*   CREATININE 0.41* 0.43* 0.47*   CALCIUM 7.8* 7.5* 7.7*                   Imaging  [id number] VIEW   Final Result      1.  Bowel gas pattern is nonobstructive. There is a large amount of colonic stool.      DX-CHEST-LIMITED (1 VIEW)   Final Result      1.  No acute cardiac or pulmonary abnormalities are identified.           Assessment/Plan  Normocytic anemia  Assessment & Plan  Assume GYN loss  Started on iron supplement     Hypomagnesemia  Assessment & Plan  Replete, continue to monitor     Gastroparesis due to DM (HCC)- (present on admission)  Assessment & Plan  Doing better  Transition to oral metoclopramide TID      DKA, type 1 (HCC)- (present on admission)  Assessment & Plan  Resolving  Doing better, abdominal pain resolving   No clear trigger   Continue to monitor overnight  Follow up on repeat beta hydroxybutyrate acid since last one slightly elevated  Last A1c 12.6 bracket (4  months ago).   Questionable compliance   Pt will need endo chronology OP referral       Hypokalemia- (present on admission)  Assessment & Plan  Replete, continue to monitor     Diabetes type I (CMS-HCC)- (present on admission)  Assessment & Plan  Poorly controled. A1c 12.6  Better in the hospital   Continue ISSS, insulin while in the hospital        VTE prophylaxis: lovenox

## 2019-08-01 NOTE — PROGRESS NOTES
"Assumed care at 19:00. Received report from CAIT Murillo. Patient is lethargic and sleepy. Assessment complete. Labs reviewed. Patient and RN discussed plan of care. Patient questions answered. Patient needs are met at this time. Bed in lowest and locked position. Call light is within reach. Hourly rounding in place. /83   Pulse (!) 102 Comment: RN notified  Temp 36.5 °C (97.7 °F) (Temporal)   Resp 18   Ht 1.651 m (5' 5\")   Wt 72 kg (158 lb 11.7 oz)   LMP 07/17/2019   SpO2 90%   BMI 26.41 kg/m²   "

## 2019-08-01 NOTE — DISCHARGE PLANNING
Patient is receiving Medicaid Meds to Beds at discharge. Preferred pharmacy changed to [facility]. Please call x [phone] prior to discharge.

## 2019-08-01 NOTE — PROGRESS NOTES
Spoke with Dr. Joseph Leach about advancing diet as tolerated, provider was agreeable and will put the order in.

## 2019-08-01 NOTE — CARE PLAN
Problem: Nutritional:  Goal: Achieve adequate nutritional intake  Description  Patient will tolerate diet >Clear liquids and consume at least 50% of meals.   Outcome: MET

## 2019-08-02 NOTE — DISCHARGE INSTRUCTIONS
Discharge Instructions per Bianca Mesa M.D.    Please make sure to take prescription as directed   Don't miss insulin   Drink plenty of fluid     DIET: diab diabetic he does not etic diet, low carb, vegetables daily    ACTIVITY: as tolerated     DIAGNOSIS: Diabetic ketoacidosis    Return to ER if starts feeling worse, fever, chest pain, confusion, cough             Discharge Instructions    Discharged to home by car with relative. Discharged via walking, hospital escort: Refused.  Special equipment needed: Not Applicable    Be sure to schedule a follow-up appointment with your primary care doctor or any specialists as instructed.     Discharge Plan:   Diet Plan: Discussed  Activity Level: Discussed  Confirmed Follow up Appointment: Patient to Call and Schedule Appointment  Confirmed Symptoms Management: Discussed  Medication Reconciliation Updated: Yes  Influenza Vaccine Indication: Not indicated: Previously immunized this influenza season and > 8 years of age    I understand that a diet low in cholesterol, fat, and sodium is recommended for good health. Unless I have been given specific instructions below for another diet, I accept this instruction as my diet prescription.   Other diet: Diabetic    Special Instructions: None    · Is patient discharged on Warfarin / Coumadin?   No     Depression / Suicide Risk    As you are discharged from [facility], it is important to learn how to keep safe from harming yourself.    Recognize the warning signs:  · Abrupt changes in personality, positive or negative- including increase in energy   · Giving away possessions  · Change in eating patterns- significant weight changes-  positive or negative  · Change in sleeping patterns- unable to sleep or sleeping all the time   · Unwillingness or inability to communicate  · Depression  · Unusual sadness, discouragement and loneliness  · Talk of wanting to die  · Neglect of personal appearance   · Rebelliousness-  reckless behavior  · Withdrawal from people/activities they love  · Confusion- inability to concentrate     If you or a loved one observes any of these behaviors or has concerns about self-harm, here's what you can do:  · Talk about it- your feelings and reasons for harming yourself  · Remove any means that you might use to hurt yourself (examples: pills, rope, extension cords, firearm)  · Get professional help from the community (Mental Health, Substance Abuse, psychological counseling)  · Do not be alone:Call your Safe Contact- someone whom you trust who will be there for you.  · Call your local CRISIS HOTLINE 085-7078 or 881-148-9351  · Call your local Children's Mobile Crisis Response Team [place] (277) 107-9549 or www.[place].ws  · Call the toll free National Suicide Prevention Hotlines   · National Suicide Prevention Lifeline 619-615-ULVU (0472)  · National Hope Line Network 800-SUICIDE (973-7667)

## 2019-08-02 NOTE — CARE PLAN
Patient does not complain of any pain at this time. No complaints of nausea or vomiting. Patient is able to tolerate all meals provided.

## 2019-08-02 NOTE — PROGRESS NOTES
"Assumed care at 19:00. Received report from CAIT Lai. Patient is calm, content and visited by mother. Assessment complete. Labs reviewed. Patient and RN discussed plan of care. Patient questions answered. Patient needs are met at this time. Bed in lowest and locked position. Call light is within reach. Hourly rounding in place. /105   Pulse 93   Temp 36.1 °C (97 °F) (Temporal)   Resp 19   Ht 1.651 m (5' 5\")   Wt 72 kg (158 lb 11.7 oz)   LMP 07/17/2019   SpO2 96%   BMI 26.41 kg/m²   "

## 2019-08-02 NOTE — DISCHARGE SUMMARY
Discharge Summary    CHIEF COMPLAINT ON ADMISSION  Chief Complaint   Patient presents with   • Hyperglycemia     >600 FSBS in triage   • Vomiting     x2 hours        Reason for Admission  Vomiting     Admission Date  7/27/2019    CODE STATUS  Full Code    HPI & HOSPITAL COURSE    28-year-old female past medical history of type 1 diabetes mellitus, recurrent hospitalization with DKA, recent discharge July 22, 2019 for DKA was admitted again for DKA.  On admission she had no source of infection.  She was admitted in critical care unit for intensive care of DKA which was resolved with proper management of IV insulin drip.  Compliance of medication is questionable.  Counseling was provided.  Resume home medication.  Patient was instructed to follow-up closely with primary care and endocrinologist    Therefore, she is discharged in guarded and stable condition to home with close outpatient follow-up.    The patient met 2-midnight criteria for an inpatient stay at the time of discharge.    Discharge Date  08/02/2019    FOLLOW UP ITEMS POST DISCHARGE  None     DISCHARGE DIAGNOSES  Active Problems:    Diabetes type I (CMS-HCC) POA: Yes    Hypokalemia POA: Yes    Gastroparesis due to DM ([facility]) POA: Yes    Normocytic anemia POA: Unknown  Resolved Problems:    DKA, type 1 ([facility]) POA: Yes    Hypomagnesemia POA: Unknown      FOLLOW UP  No future appointments.  Bruna Laboy M.D.  [address] [place] [id number] [phone]    In 2 weeks        MEDICATIONS ON DISCHARGE     Medication List      START taking these medications      Instructions   ascorbic acid 500 MG tablet  Commonly known as:  VITAMIN C   Take 1 Tab by mouth every day.  Dose:  500 mg     ferrous sulfate 325 (65 Fe) MG tablet   Take 1 Tab by mouth every day.  Dose:  325 mg     Pen Needles 32G X 4 MM Misc   100 Units by Does not apply route 3 times a day.  Dose:  100 Units        CHANGE how you take these medications      Instructions   insulin lispro (Human) 100  UNIT/ML Sopn injection  What changed:    · medication strength  · how much to take  · additional instructions  Commonly known as:  HUMALOG   Inject 8 Units as instructed 3 times a day before meals.  Dose:  8 Units        CONTINUE taking these medications      Instructions   insulin glargine 100 UNIT/ML Sopn injection  Commonly known as:  LANTUS   Inject 30 Units as instructed 2 Times a Day.  Dose:  30 Units     metoclopramide 10 MG Tabs  Commonly known as:  REGLAN   Take 1 Tab by mouth 4 times a day.  Dose:  10 mg     omeprazole 20 MG delayed-release capsule  Commonly known as:  PRILOSEC   Take 1 Cap by mouth every day.  Dose:  20 mg     ondansetron 4 MG Tbdp  Commonly known as:  ZOFRAN ODT   Take 1 Tab by mouth every four hours as needed for Nausea (give PO if no IV route available).  Dose:  4 mg     SUMAtriptan 50 MG Tabs  Commonly known as:  IMITREX   Take 1 Tab by mouth Once PRN for Migraine. Can repeat in 2 hrs if needed  Dose:  50 mg            Allergies  Allergies   Allergen Reactions   • Pcn [Penicillins] Shortness of Breath and Swelling     Per patient's Mom, patient tolerates Keflex   • Lisinopril Unspecified     Per historical: Reported pedal swelling. No facial/angioedema or rash nor respiratory symptoms.    • Promethazine Vomiting       DIET  Orders Placed This Encounter   Procedures   • Diet Order Diabetic     Standing Status:   Standing     Number of Occurrences:   1     Order Specific Question:   Diet:     Answer:   Diabetic [3]       ACTIVITY  As tolerated.  Weight bearing as tolerated    CONSULTATIONS  Critical care     PROCEDURES  None     LABORATORY  Lab Results   Component Value Date    SODIUM 138 08/02/2019    POTASSIUM 3.6 08/02/2019    CHLORIDE 104 08/02/2019    CO2 28 08/02/2019    GLUCOSE 203 (H) 08/02/2019    BUN 3 (L) 08/02/2019    CREATININE 0.45 (L) 08/02/2019        Lab Results   Component Value Date    WBC 4.1 (L) 08/01/2019    HEMOGLOBIN 8.6 (L) 08/01/2019    HEMATOCRIT 27.4 (L)  08/01/2019    PLATELETCT 198 08/01/2019        Total time of the discharge process exceeds 30 minutes.

## 2019-08-06 NOTE — DOCUMENTATION QUERY
[facility]                                                                       Query Response Note      PATIENT:               AMANDA BRANCH  ACCT #:                  [id number]  MRN:                     [id number]  :                      1990  ADMIT DATE:       2019 5:00 AM  DISCH DATE:        2019 10:32 AM  RESPONDING  PROVIDER #:        [id number]           QUERY TEXT:    A sodium lab value of 128 is noted in the Medical Record.  Can a diagnosis be provided to support this finding?    NOTE:  If an appropriate response is not listed below, please respond with a new note.    The patient's Clinical Indicators include:  Na 128 - 144    Treatment:   IV NS 2000ml bolus & monitor labs     Risk Factors:   DM type 1 with diabetic ketoacidosis, gastroparesis, uncontrolled DM1, hypokalemia, & hypomagnesaemia  Options provided:   -- Hyponatremia   -- Findings are clinically insignificant   -- Unable to determine      Query created by: Yessenia Matthew on 2019 3:21 PM    RESPONSE TEXT:    Hyponatremia          Electronically signed by:  ALONSO APPLE 2019 3:59 PM

## 2019-08-13 NOTE — PROGRESS NOTES
SBAR Documentation: Situation, Background, Assessment, Recommendation     Situation    ew referral for CCM RN   Background       Type 1 Diabetes, encephalopathy, HTN, dyslipidemia, neuropathy   Assessment    CCM RN called and left 3 voice mail message for Alis with no return call.   Recommendation CCM RN will remove Alis from the active list and complete referral at this time.   Escalation Protocol: No Escalation Needed

## 2019-09-26 NOTE — PROGRESS NOTES
Critical Care Progress Note    Date of admission  3/20/2019    Chief Complaint  28 y.o. female who presented 3/20/2019 with DKA, gastroparesis that presented with one day of acute severe back pain. She mariano vomiting to this or feeling sick or missing diabetic medication. She present to ER found to have DKA HCO <5, BUN 30, cr 1.38 glucose of 332, urine with ketones, WBC of 22 with no bandemia she was given 2L of fluids and started on insulin gtt. She also had abdominal CT scan preformed for her uncontrolling vomiting and flank/back pain which found small amounts of mediastinal air. She was started on antibiotics ceftriaxone and metronidazole. Patient history is limited due to her acuity and continued retching when I see her. She describe that the pain came first not vomiting then pain in her back. Mariano fever chills or dysuria. She dose describe some pleuritic pain. Taken from Dr Benz.     Hospital Course  admit to icu 3/21     Interval Problem Update  Reviewed last 24 hour events:    Neuro: ao4 little fatigue right flank pain   HR: 's  SBP: 120-140's  Tmax: afebrile  GI:  Continue n/v   UOP: adequated  Lines: voiding, right femoral up to bathroom  Resp: room air   Vte: lovenox  PPI/H2:pepcid  Antibx: no antibiotics  kphos and mg  Insulin gtt      Review of Systems  Review of Systems   Constitutional: Negative for chills and fever.   Respiratory: Positive for cough. Negative for sputum production and shortness of breath.    Cardiovascular: Negative for chest pain.   Gastrointestinal: Positive for abdominal pain, nausea and vomiting. Negative for blood in stool, constipation and diarrhea.   Genitourinary: Negative for dysuria, flank pain, frequency, hematuria and urgency.   Musculoskeletal: Negative for back pain.   Neurological: Negative for focal weakness, seizures, loss of consciousness, weakness and headaches.        Vital Signs for last 24 hours   Temp:  [35.7 °C (96.2 °F)-37.3 °C (99.2 °F)] 35.8 °C  (96.5 °F)  Pulse:  [] 104  Resp:  [10-25] 16  SpO2:  [91 %-99 %] 91 %    Hemodynamic parameters for last 24 hours       Respiratory Information for the last 24 hours       Physical Exam   Physical Exam   Constitutional: She is oriented to person, place, and time. No distress.   More awake feels a bit better   HENT:   Head: Normocephalic and atraumatic.   Neck: No JVD present. No thyromegaly present.   Cardiovascular: Normal rate and regular rhythm.    No murmur heard.  Pulmonary/Chest: Effort normal and breath sounds normal. No respiratory distress. She has no wheezes. She has no rales.   No crepitus   Abdominal: She exhibits no distension. There is no tenderness. There is no rebound and no guarding.   Musculoskeletal: She exhibits no edema or tenderness.   Neurological: She is alert and oriented to person, place, and time. No cranial nerve deficit.   Following commands, answers appropriately, moves all extremities   Skin: Skin is warm and dry. No erythema.   Psychiatric: She has a normal mood and affect.       Medications  Current Facility-Administered Medications   Medication Dose Route Frequency Provider Last Rate Last Dose   • magnesium sulfate IVPB premix 2 g  2 g Intravenous Once Hugo Benz M.D. 25 mL/hr at 03/23/19 1034 2 g at 03/23/19 1034   • potassium phosphates 15 mmol in  mL ivpb  15 mmol Intravenous Once Hugo Benz M.D. 83.3 mL/hr at 03/23/19 1034 15 mmol at 03/23/19 1034   • metoclopramide (REGLAN) injection 10 mg  10 mg Intravenous Q8HR Hugo Benz M.D.   10 mg at 03/23/19 0627   • famotidine (PEPCID) injection 20 mg  20 mg Intravenous BID Hugo Benz M.D.   20 mg at 03/23/19 0627   • D5LR infusion   Intravenous Continuous Hugo Benz M.D. 50 mL/hr at 03/23/19 0734     • insulin regular human (HUMULIN/NOVOLIN R) 62.5 Units in  mL infusion per protocol  0-29 Units/hr Intravenous Continuous Michael Lincoln D.O. 12 mL/hr at 03/23/19 1116 3 Units/hr at  03/23/19 1116   • dextrose 50% (D50W) injection 25-50 mL  12.5-25 g Intravenous PRN Michael Lincoln D.O.       • K+ Scale: Goal of 4.5  1 Each Intravenous Q6HRS Hugo Benz M.D.   1 Each at 03/23/19 0600   • acetaminophen (TYLENOL) tablet 650 mg  650 mg Oral Q6HRS PRN Max Browning M.D.       • Pharmacy Consult Request ...Pain Management Review 1 Each  1 Each Other PHARMACY TO DOSE Max Browning M.D.        And   • oxyCODONE immediate-release (ROXICODONE) tablet 2.5 mg  2.5 mg Oral Q3HRS PRN Max Browning M.D.        And   • oxyCODONE immediate-release (ROXICODONE) tablet 5 mg  5 mg Oral Q3HRS PRN Max Browning M.D.        And   • morphine (pf) 4 mg/ml injection 2 mg  2 mg Intravenous Q3HRS PRN Max Browning M.D.   2 mg at 03/23/19 0808   • cloNIDine (CATAPRES) tablet 0.1 mg  0.1 mg Oral Q6HRS PRN Max Browning M.D.       • ondansetron (ZOFRAN) syringe/vial injection 4 mg  4 mg Intravenous Q4HRS PRN Max Browning M.D.   4 mg at 03/22/19 0932   • ondansetron (ZOFRAN ODT) dispertab 4 mg  4 mg Oral Q4HRS PRN Max Browning M.D.       • promethazine (PHENERGAN) tablet 12.5-25 mg  12.5-25 mg Oral Q4HRS PRN Max Browning M.D.       • promethazine (PHENERGAN) suppository 12.5-25 mg  12.5-25 mg Rectal Q4HRS PRN Max Browning M.D.   25 mg at 03/20/19 1930   • prochlorperazine (COMPAZINE) injection 5-10 mg  5-10 mg Intravenous Q4HRS PRN Max Browning M.D.   10 mg at 03/23/19 0808   • senna-docusate (PERICOLACE or SENOKOT S) 8.6-50 MG per tablet 2 Tab  2 Tab Oral BID Max Browning M.D.   Stopped at 03/20/19 0815    And   • polyethylene glycol/lytes (MIRALAX) PACKET 1 Packet  1 Packet Oral QDAY PRN Max Browning M.D.        And   • magnesium hydroxide (MILK OF MAGNESIA) suspension 30 mL  30 mL Oral QDAY PRN Max Browning M.D.        And   • bisacodyl (DULCOLAX) suppository 10 mg  10 mg Rectal QDAY PRN Max Browning M.D.       • enoxaparin (LOVENOX) inj 40 mg  40 mg Subcutaneous DAILY Max TRIPLETT  BRIGIDA Browning   40 mg at 03/22/19 1658       Fluids    Intake/Output Summary (Last 24 hours) at 03/23/19 1122  Last data filed at 03/23/19 1000   Gross per 24 hour   Intake          2137.13 ml   Output             1750 ml   Net           387.13 ml       Laboratory          Recent Labs      03/21/19   0207  03/21/19   0618   03/22/19   0550   03/22/19   1731  03/22/19   2341  03/23/19   0445   SODIUM  147*  149*   < >  146*   < >  148*  149*  145   POTASSIUM  3.3*  3.4*   < >  3.1*   < >  3.1*  3.2*  3.4*   CHLORIDE  122*  122*   < >  119*   < >  116*  113*  111   CO2  18*  14*   < >  21   < >  25  27  27   BUN  25*  24*   < >  13   < >  9  9  8   CREATININE  1.29  1.28   < >  0.99   < >  0.83  0.74  0.70   MAGNESIUM  1.8   --    --   2.1   --    --    --   1.6   PHOSPHORUS  2.3*  2.1*   --   2.5   --    --    --   2.1*   CALCIUM  7.9*  7.6*   < >  8.2*   < >  8.2*  8.0*  8.1*    < > = values in this interval not displayed.     Recent Labs      03/22/19   1731  03/22/19   2341  03/23/19   0445   GLUCOSE  152*  173*  205*     Recent Labs      03/21/19   0207  03/23/19   0445   WBC  10.2  7.6   NEUTSPOLYS  78.60*  67.90   LYMPHOCYTES  9.40*  19.80*   MONOCYTES  11.20  10.60   EOSINOPHILS  0.00  0.90   BASOPHILS  0.40  0.40     Recent Labs      03/21/19   0207  03/23/19   0445   RBC  4.24  4.47   HEMOGLOBIN  11.9*  12.8   HEMATOCRIT  34.9*  37.1   PLATELETCT  203  158*       Imaging  X-Ray:  I have personally reviewed the images and compared with prior images.  CT:    Reviewed    Prior NM gastric study with delayed emptying.     Assessment/Plan  DM (diabetes mellitus) type 1, uncontrolled, with ketoacidosis (HCC)- (present on admission)   Assessment & Plan    Reason for DKA: Vomiting/pain esophageal injury infection  -> left lower lobe pna  (rule out infection, AMI,CVA, medication, thiazide, beta blocker,antipsychotic, steroids, pregnancy, pancreatitis)  Follow Q 1 hour accuchecks, serial chemistry and aggressive replace  K, PO4, Mg  Check serum osm, ketones, hgA1c, vbg/abg  Fluid resus NS/LR  Start long acting Insulin    Transition to critical care insulin gtt since still with protracted vomiting unable to tolerate PO's.   Avoid chloride replacement due to hyperchloremic state from resus.     Still on insulin drip improving nausea on schedule reglan and now adding zyprexa to see if that helps             Abnormal chest x-ray- (present on admission)   Assessment & Plan    CT chest with ? Trace left lower lobe pneumonia  Possible aspiration patient lays on left side with persistent vomiting.  Continue metronidazole/ceftriaxone  Will check procal -> stopped antibiotics pneumonitis     Gastroparesis due to DM (HCC)- (present on admission)   Assessment & Plan    Likely DKA worsening baseline gastroparesis with ketosis  Reglan and antiemetics  Can also use low dose haldol    Will start home dose of Reglan IV to help with gastric empyting, will start zyprexa to see if relief of nausea    Mother asking about medication to help with her appetite as an outpatient and also pain. An antidepressant with side effect of weight gain an appetite might help this and control her pain better will have her discuss this with PCP.      CHERELLE (acute kidney injury) (HCC)- (present on admission)   Assessment & Plan    Likely related to dehydration  Avoid nephrotoxins  No hydro on ct scan 3/20       Pneumomediastinum (HCC)- (present on admission)   Assessment & Plan    Small anterior mediastinal air unclear etiology at this time  CT chest with no signs of tracheal or esophageal injury.     If worsening pain or difficulty breathing get stat CXR to rule out worsening otherwise follow clinically          VTE:  Lovenox  Ulcer: H2 Antagonist to help with gastritis?  Lines: None    I have performed a physical exam and reviewed and updated ROS and Plan today (3/23/2019). In review of yesterday's note (3/22/2019), there are no changes except as documented above.      Discussed patient condition and risk of morbidity and/or mortality with Hospitalist, Family, RN, Pharmacy and Patient     The patient remains critically ill from continued nausea on insulin with titration.  Critical care time = 30 minutes in directly providing and coordinating critical care and extensive data review.  No time overlap and excludes procedures.   No

## 2019-09-29 NOTE — PROGRESS NOTES
Late entry  Dr. Joiner paged regarding patient's sustaining HR 150s, patient unable to void using bedpan, unable to get out of bed, unstable, HR 150s, RR 30s. Order for Pitt catheter placement.  Pitt inserted, 2L out. HR sustaining 160s, STAT ABG drawn (2 RTs to draw), ABG resulted, MD notified: pH 6.9, CO2 10, PO2 111, HCO3 2.5, BE -28, lactic 2.9. Orders received, see MAR.   Yehuda is a 12 yo M with PMH of aortic root dilation admitted for respiratory failure and shock. He is critically ill and has rapidly decompensated since yesterday.     Differential for prolonged daily fevers relating to rheumatic conditions includes SLE, vasculitis, sarcoidosis, systemic JEFFERY. His ARTEMIO was negative as an outpatient which makes SLE extremely unlikely (~95% of SLE pts have a +ARTEMIO). Additionally he has no other concerning signs or symptoms for SLE (e.g. rash, joint sx, alopecia, cytopenias). Systemic JEFFERY is also unlikely - this usually presents with quotidien fevers (usually in AM and PM) and rash that will come and go with fevers. Yehuda also does not have signs/symptoms of sarcoidosis (e.g. arthritis, uveitis, rash) or vasculitis (e.g. renal impairment, rash, joint sx); however, will follow ACE and ANCA that were sent from ED. He does have hilar lymphadenopathy; however, he has diffuse lymphadenopathy so this is not specific. Unlikely for rheumatic conditions to have such an acute increase in WBC count as well (44-->70-->88 within 24 hours).    CT chest/abdomen/pelvis showed diffuse lymphadenopathy. Once he is stabilized a LN biopsy will likely be beneficial.    Currently being treated with Anakinra for concerns for HLH/MAS. We have low suspicion for MAS given lack of cytopenias, transaminitis, low fibrinogen. His ferritin is ~1000 but in patients this sick with MAS we would expect the ferritin to be significantly higher that this.     Plan-  - No additional labs at this time. Follow results of ACE, ANCA.  - Continue to involve heme/onc, ID  - Agree with bronchoscopy   - Continue current management/stabilization by PICU    Plan d/w pt's parents and PICU team Yehuda is a 10 yo M with PMH of aortic root dilation admitted for respiratory failure and shock. He is critically ill and currently intubated and on ecmo for cardiorespiratory support.    Differential for prolonged daily fevers relating to rheumatic conditions includes SLE, vasculitis, sarcoidosis, systemic JEFFERY. His ARTEMIO was negative as an outpatient which makes SLE extremely unlikely (~95% of SLE pts have a +ARTEMIO). Additionally he has no other concerning signs or symptoms for SLE (e.g. rash, joint sx, alopecia, cytopenias). Systemic JEFFERY is also unlikely - this usually presents with quotidien fevers (usually in AM and PM) and rash that will come and go with fevers. Yehuda also does not have signs/symptoms of sarcoidosis (e.g. arthritis, uveitis, rash) or vasculitis (e.g. renal impairment, rash, joint sx); however, will follow ACE and ANCA that were sent from ED. He does have hilar lymphadenopathy; however, he has diffuse lymphadenopathy so this is not specific. Unlikely for rheumatic conditions to have such an acute increase in WBC count as well (44-->70-->88 within 24 hours).    CT chest/abdomen/pelvis showed diffuse lymphadenopathy. Once he is stabilized a LN biopsy will likely be beneficial - though would be after receiving large doses of steroids.    Currently being treated with Anakinra for concerns for HLH/MAS. We have low suspicion for MAS given lack of cytopenias, transaminitis, low fibrinogen. His ferritin is ~2000 but in patients this sick with MAS we would expect the ferritin to be significantly higher that this.     Bronch on 9/27 showed increased LLL secretion (greenish/brown), otherwise normal bronch.  After being placed on ecmo patient was still very unstable. After giving Solumedrol pulse patient able to come off some drips (norepi and vasopressin). Now on milrinone, epi and furosemide drips. Per PICU will likely start dialysis for fluid overload.  Patient also given IVIG last night - ANCA/ACE redrawn prior to IVIG.  Patient also started on Cefepime and Doxycycline for broader coverage.  Patient now afebrile - but fever curve not reliable as patient's body temp controlled while on ecmo.    Plan-  - Recommend trending daily ferritin. Follow results of ACE, ANCA (sent as outpatient 9/24, ED 9/26 and PICU 9/28)  - f/u remaining ID work-up  - f/u bronchoscopy labs  - Continue to involve heme/onc, ID  - On Anakinra 100mg q6 hours per PICU  - s/p Solumedrol pulse 1g, per PICU plans to continue x3-5 days (9/28- )  - s/p IVIG (9/28) per PICU  - Continue current management/stabilization by PICU            - Currently intubated and on Ecmo            - Currently on Epi, Milrinone and furosemide drips            - Will likely start dialysis            - Currently On Azithromycin, Cefepime, Vancomycin, Voriconazole, Doxycycline            - Sending daily Bcx while on ecmo due to unreliable fever monitoring  Plan d/w PICU team - parents were not at bedside

## 2019-10-13 PROBLEM — E10.11 DIABETIC KETOACIDOSIS WITH COMA ASSOCIATED WITH TYPE 1 DIABETES MELLITUS (HCC): Status: ACTIVE | Noted: 2019-01-01

## 2019-10-13 PROBLEM — D72.829 LEUKOCYTOSIS: Status: ACTIVE | Noted: 2019-01-01

## 2019-10-14 PROBLEM — Z95.828 PORT-A-CATH IN PLACE: Status: ACTIVE | Noted: 2019-01-01

## 2019-10-14 PROBLEM — E83.39 HYPOPHOSPHATEMIA: Status: ACTIVE | Noted: 2019-01-01

## 2019-10-14 PROBLEM — D75.839 THROMBOCYTOSIS: Status: ACTIVE | Noted: 2019-01-01

## 2019-10-14 NOTE — PROGRESS NOTES
MD notified regarding blood sugar increase from 123 to 176. No rate change required at this time per MD. Will update with BMP results when available.

## 2019-10-14 NOTE — ED TRIAGE NOTES
Pt BIB mother with c/c of high blood sugar. Increased RR, altered in triage. Blood sugar 526. Hx of type 1 diabetes. Sepsis score of 4

## 2019-10-14 NOTE — ED PROVIDER NOTES
ED Provider Note    Scribed for Kobi Calvillo M.D. by Sandrita Allen. 10/13/2019  6:08 PM    Primary care provider: Bruna Laboy M.D.  Means of arrival: Walk-in  History obtained from: Patient   History limited by: Further history of present illness cannot be obtained due to the patient being near sycnope due to DKA       CHIEF COMPLAINT  Chief Complaint   Patient presents with   • High Blood Sugar       HPI  Alis Sparks is a 29 y.o. female who presents to the Emergency Department for evaluation of hyperglycemia onset today. Patient is diabetic and has had DKA 17 times.  She was sent home last night from work due to being dizzy. Per mother, patient has one episode of diarrhea today, hot and cold flashes, but denies any dysuria, urine infrequency, fever, cough, sore throat, or leg swelling. Mother denies any recent drug or alcohol use. She states that she did not miss a dose of insulin.     Further history of present illness cannot be obtained due to the patient being near sycnope due to DKA    REVIEW OF SYSTEMS  Pertinent positives include: hyperglycemia, hot and cold flashes, diarrhea, dizziness.  Pertinent negatives include: dysuria, urine infrequency, fever, cough, sore throat, or leg swelling.    Further history of present illness cannot be obtained due to the patient being near sycnope due to DKA    PAST MEDICAL HISTORY  Past Medical History:   Diagnosis Date   • Backpain    • Bronchitis    • Diabetes type 1, controlled (HCC)     tests 4-5 times daily   • DKA (diabetic ketoacidoses) (HCC)    • Fall    • Gastroparesis    • Kidney infection    • Pneumonia    • UTI (urinary tract infection)        FAMILY HISTORY  Family History   Problem Relation Age of Onset   • Cancer Unknown         breasts, multiple family members   • Hypertension Maternal Grandfather        SOCIAL HISTORY  Social History     Tobacco Use   • Smoking status: Never Smoker   • Smokeless tobacco: Never Used   Substance Use Topics   •  Alcohol use: No   • Drug use: No     Social History     Substance and Sexual Activity   Drug Use No       SURGICAL HISTORY  Past Surgical History:   Procedure Laterality Date   • GASTROSCOPY-ENDO N/A 6/9/2018    Procedure: GASTROSCOPY-ENDO WITH BIOPSIES AND DIALATION;  Surgeon: Marino Black M.D.;  Location: SURGERY [facility];  Service: Gastroenterology   • SUBMANDIBLE ABSCESS INCISION AND DRAINAGE Left 11/8/2017    Procedure: SUBMANDIBLE ABSCESS INCISION AND DRAINAGE;  Surgeon: Osman Young M.D.;  Location: SURGERY [facility];  Service: Dental   • DENTAL EXTRACTION(S)  11/8/2017    Procedure: DENTAL EXTRACTION(S);  Surgeon: Osman Young M.D.;  Location: SURGERY [facility];  Service: Dental   • GASTROSCOPY-ENDO  9/18/2014    Performed by Sylvain PERRY M.D. at ENDOSCOPY [facility]   • GASTROSCOPY WITH BIOPSY  9/18/2014    Performed by Sylvain PERRY M.D. at ENDOSCOPY [facility]   • OTHER      surgery to patch lung after pneumor from central line placement       CURRENT MEDICATIONS  Current Outpatient Medications:   •  SUMAtriptan (IMITREX) 50 MG Tab, Take 1 Tab by mouth Once PRN for Migraine. Can repeat in 2 hrs if needed, Disp: 10 Tab, Rfl: 0  •  insulin glargine (BASAGLAR KWIKPEN) 100 UNIT/ML Solution Pen-injector injection, Inject 30 Units as instructed 2 Times a Day., Disp: 1 PEN, Rfl: 2  •  metoclopramide (REGLAN) 10 MG Tab, Take 1 Tab by mouth 4 times a day., Disp: 120 Tab, Rfl: 0  •  omeprazole (PRILOSEC) 20 MG delayed-release capsule, Take 1 Cap by mouth every day., Disp: 30 Cap, Rfl: 0  •  ondansetron (ZOFRAN ODT) 4 MG TABLET DISPERSIBLE, Take 1 Tab by mouth every four hours as needed for Nausea (give PO if no IV route available)., Disp: 10 Tab, Rfl: 1  •  insulin lispro, Human, (ADMELOG SOLOSTAR) 100 UNIT/ML Solution Pen-injector injection, Inject 8 Units as instructed 3 times a day before meals., Disp: 5 PEN, Rfl: 0  •  Insulin Pen Needle (PEN NEEDLES) 32G X 4 MM Misc, 100  "Units by Does not apply route 3 times a day., Disp: 100 Each, Rfl: 1  •  ferrous sulfate 325 (65 Fe) MG tablet, Take 1 Tab by mouth every day., Disp: 30 Tab, Rfl: 0  •  ascorbic acid (VITAMIN C) 500 MG tablet, Take 1 Tab by mouth every day., Disp: 30 Tab, Rfl: 0    ALLERGIES  Allergies   Allergen Reactions   • Pcn [Penicillins] Shortness of Breath and Swelling     Per patient's Mom, patient tolerates Keflex   • Lisinopril Unspecified     Per historical: Reported pedal swelling. No facial/angioedema or rash nor respiratory symptoms.    • Promethazine Vomiting       PHYSICAL EXAM  VITAL SIGNS: /83   Pulse (!) 141 Comment: Samia RN aware   Temp (!) 35.7 °C (96.3 °F) (Temporal)   Resp (!) 35   Ht 1.676 m (5' 6\")   SpO2 98%   BMI 25.62 kg/m²   Reviewed and tachycardic, high normal blood pressure, tachypneic, afebrile  Constitutional: Well developed, Well nourished, too small respiration, does not respond to questions initially.  HENT: Dry emesis on chin, no oral flush, Normocephalic, atraumatic, bilateral external ears normal, oropharynx moist, No exudates or erythema.   Eyes: PERRLA, conjunctiva pink, no scleral icterus.   Cardiovascular: Regular rate and rhythm. No murmurs, rubs or gallops.   Respiratory: Kussmaul respirations, tachypneic, Lungs clear to auscultation bilaterally. No wheezes, rales, or rhonchi.   Abdominal:  Abdomen soft, non-tender, non distended. No rebound, or guarding.    Skin: No erythema, no rash.   Genitourinary: No costovertebral angle tenderness.   Musculoskeletal: NO edema.   Neurologic: Nearly comatose, mumbled answers to questions, cranial nerves 2-12 intact by passive exam.  No focal deficit noted.  Psychiatric: Affect normal, Judgment normal, Mood normal.     DIFFERENTIAL DIAGNOSIS:  DKA, uncontrolled diabetes, lactic acidosis, pneumonia, UTI, noncompliance.    EKG Interpretation:  Interpreted by me    Rhythm: sinus tachycardia   Rate: 138  Axis: normal  Ectopy: " none  Conduction: normal  ST Segments: no acute change  T Waves: no acute change  Q Waves: none  Clinical Impression: Normal EKG without acute changes     RADIOLOGY/PROCEDURES  DX-CHEST-PORTABLE (1 VIEW)   Final Result      No acute cardiopulmonary process is seen.      Linear metallic density projects over the right lung base. Clinical correlation recommended.           Radiologist interpretation have been reviewed by me.     LABORATORY:  Results for orders placed or performed during the hospital encounter of 10/13/19   Lactic acid (lactate)   Result Value Ref Range    Lactic Acid 2.0 0.5 - 2.0 mmol/L   CBC WITH DIFFERENTIAL   Result Value Ref Range    WBC 24.9 (H) 4.8 - 10.8 K/uL    RBC 5.57 (H) 4.20 - 5.40 M/uL    Hemoglobin 13.4 12.0 - 16.0 g/dL    Hematocrit 47.2 (H) 37.0 - 47.0 %    MCV 84.7 81.4 - 97.8 fL    MCH 24.1 (L) 27.0 - 33.0 pg    MCHC 28.4 (L) 33.6 - 35.0 g/dL    RDW 46.1 35.9 - 50.0 fL    Platelet Count 470 (H) 164 - 446 K/uL    MPV 10.8 9.0 - 12.9 fL    Neutrophils-Polys 76.30 (H) 44.00 - 72.00 %    Lymphocytes 16.30 (L) 22.00 - 41.00 %    Monocytes 4.70 0.00 - 13.40 %    Eosinophils 0.20 0.00 - 6.90 %    Basophils 0.60 0.00 - 1.80 %    Immature Granulocytes 1.90 (H) 0.00 - 0.90 %    Nucleated RBC 0.00 /100 WBC    Neutrophils (Absolute) 18.98 (H) 2.00 - 7.15 K/uL    Lymphs (Absolute) 4.05 1.00 - 4.80 K/uL    Monos (Absolute) 1.17 (H) 0.00 - 0.85 K/uL    Eos (Absolute) 0.05 0.00 - 0.51 K/uL    Baso (Absolute) 0.15 (H) 0.00 - 0.12 K/uL    Immature Granulocytes (abs) 0.46 (H) 0.00 - 0.11 K/uL    NRBC (Absolute) 0.00 K/uL    Anisocytosis 1+     Microcytosis 1+    COMP METABOLIC PANEL   Result Value Ref Range    Sodium 140 135 - 145 mmol/L    Potassium 4.6 3.6 - 5.5 mmol/L    Chloride 104 96 - 112 mmol/L    Co2 <5 (LL) 20 - 33 mmol/L    Anion Gap 31.0 (H) 0.0 - 11.9    Glucose 660 (HH) 65 - 99 mg/dL    Bun 24 (H) 8 - 22 mg/dL    Creatinine 1.30 0.50 - 1.40 mg/dL    Calcium 9.1 8.5 - 10.5 mg/dL     AST(SGOT) 12 12 - 45 U/L    ALT(SGPT) 10 2 - 50 U/L    Alkaline Phosphatase 143 (H) 30 - 99 U/L    Total Bilirubin 0.2 0.1 - 1.5 mg/dL    Albumin 4.6 3.2 - 4.9 g/dL    Total Protein 8.0 6.0 - 8.2 g/dL    Globulin 3.4 1.9 - 3.5 g/dL    A-G Ratio 1.4 g/dL   URINALYSIS   Result Value Ref Range    Color Yellow     Character Clear     Specific Gravity 1.026 <1.035    Ph 5.0 5.0 - 8.0    Glucose >=1000 (A) Negative mg/dL    Ketones >=160 Negative mg/dL    Protein 100 (A) Negative mg/dL    Bilirubin Negative Negative    Urobilinogen, Urine 0.2 Negative    Nitrite Negative Negative    Leukocyte Esterase Negative Negative    Occult Blood Negative Negative    Micro Urine Req Microscopic    HCG Qual Serum   Result Value Ref Range    Beta-Hcg Qualitative Serum Negative Negative   PHOSPHORUS   Result Value Ref Range    Phosphorus 7.2 (H) 2.5 - 4.5 mg/dL   BETA-HYDROXYBUTYRIC ACID   Result Value Ref Range    beta-Hydroxybutyric Acid >8.00 (H) 0.02 - 0.27 mmol/L   MAGNESIUM   Result Value Ref Range    Magnesium 2.2 1.5 - 2.5 mg/dL   URINE MICROSCOPIC (W/UA)   Result Value Ref Range    WBC 0-2 /hpf    RBC 5-10 (A) /hpf    Bacteria Negative None /hpf    Epithelial Cells Negative /hpf    Hyaline Cast 0-2 /lpf   ACCU-CHEK GLUCOSE   Result Value Ref Range    Glucose - Accu-Ck 527 (HH) 65 - 99 mg/dL     Lab results reviewed by me.     INTERVENTIONS: Indication IV fluids DKA for which p.o. hydration and adequate  NS infusion 1,000 mL (0 mL Intravenous Stopped 10/13/19 1849)   NS infusion 1,000 mL (1,000 mL Intravenous New Bag 10/13/19 1850)   NS (BOLUS) infusion 1,000 mL (1,000 mL Intravenous Given 10/13/19 1855)   Insulin infusion initiated at 6 units/h in the ED    Response: After IV fluids improved hydration and improved mentation.  Patient was now able to answer questions although she nodded answers instead of verbally communicating.  She is had abdominal pain and vomiting but no diarrhea fever cough or sore throat.  No urinary  symptoms..    ED COURSE:  Nursing notes, VS, PMSFHx reviewed in chart.     6:08 PM - Patient seen and examined at bedside. Patient is near syncope and cannot communicate well.    6:18 PM I discussed the patient's case and the above findings with the internal medicine resident who agrees to admit the patient.    7:50 PM - Patient was reevaluated at bedside. She was responding to commands.     The patient remains critically ill.  Critical care time = 33 minutes in directly providing and coordinating critical care and extensive data review.  No time overlap and excludes procedures.    Case discussed with Dr. Boyle in our internal medicine resident who will together admit the patient to the ICU    MEDICAL DECISION MAKING:  Critically ill patient presents with severe diabetic ketoacidosis without clear trigger.  Noncompliance is a possibility as is the diabetic.  She may have a viral syndrome.  There is no evidence of pneumonia or UTI.  There is no evidence of acute abdominal process.    PLAN:  Admission to ICU for IV fluids, IV insulin drip, further assessment for trigger of DKA    CONDITION: Guarded.  Critical care time 33 minutes    FINAL IMPRESSION  1. Diabetic ketoacidosis with coma associated with type 1 diabetes mellitus (HCC)    2.      Critical care     Sandrita BRAMBILA (Annia), am scribing for, and in the presence of, Kobi Calvillo M.D..    Electronically signed by: Sandrita Allen (Annia), 10/13/2019    Kobi BRAMBILA M.D. personally performed the services described in this documentation, as scribed by Sandrita Allen in my presence, and it is both accurate and complete.C.    The note accurately reflects work and decisions made by me.  Kobi Calvillo  10/14/2019  3:01 PM

## 2019-10-14 NOTE — CONSULTS
Critical Care Consultation    Date of consult: 10/13/2019    Referring Physician  Kobi Calvillo M.D.    Reason for Consultation  DKA altered LOC    History of Presenting Illness  29 y.o. female history of type 1 diabetes and most recent admit was July of this year for DKA, brittle type I diabetes with almost 20 admits for DKA complicated by gastroparesis but otherwise negative past medical history who presented 10/13/2019 with kusmaul respirations and altered LOC.  Blood sugar was found to be elevated at 660 and bicarb level was less than 5.  Beta hydroxybutyric acid also came back at greater than 8.  She received normal saline fluid bolus in the ER and a second liter bolus of lactated Ringer's.  She was started fairly quickly on insulin infusion.  Initial lactic acid level was 2.  She initially was very poorly responsive and get no verbal and minimal other response until after fluids were initiated and insulin started.  She was then able to move all extremities to command although weakly and in a delayed fashion.  She was also able to open eyes and lift her head off the bed weakly.  Patient has a right anterior chest port that looks okay and was accessed by the ER staff.  Patient is had so many admissions and difficulty with gastroparesis and vomiting that she had this port placed in the past for easy venous access.    Patient's mother is at the bedside and gave a history of the patient coming home in the afternoon yesterday bit early from work not feeling well.  She did not describe any specific symptoms beyond malaise.  This a.m. she was okay and responding.  When she returned from work about 12 hours later she was unresponsive and having kussmaul respirations.  She did state that her daughter had multiple admits that she had brittle diabetes.  Gastroparesis has been a problem as well and she is tried CBD oil without much benefit.  She does not smoke marijuana or abuse any other substances.  She thought she was  fairly compliant however her hemoglobin A1c is 14.    Code Status  Full Code    Review of Systems  Review of Systems   Unable to perform ROS: Acuity of condition       Past Medical History   has a past medical history of Backpain, Bronchitis, Diabetes type 1, controlled (HCC), DKA (diabetic ketoacidoses) (HCC), Fall, Gastroparesis, Kidney infection, Pneumonia, and UTI (urinary tract infection).    Surgical History   has a past surgical history that includes gastroscopy-endo (9/18/2014); gastroscopy with biopsy (9/18/2014); other; submandible abscess incision and drainage (Left, 11/8/2017); dental extraction(s) (11/8/2017); and gastroscopy-endo (N/A, 6/9/2018).    Family History  family history includes Cancer in her unknown relative; Hypertension in her maternal grandfather.    Social History   reports that she has never smoked. She has never used smokeless tobacco. She reports that she does not drink alcohol or use drugs.    Medications  Home Medications    **Home medications have not yet been reviewed for this encounter**       Current Facility-Administered Medications   Medication Dose Route Frequency Provider Last Rate Last Dose   • morphine (pf) 4 MG/ML injection 2 mg  2 mg Intravenous Q2HRS PRN Trace Nathan M.D.   2 mg at 10/14/19 0018   • insulin regular human (HUMULIN/NOVOLIN R) 62.5 Units in  mL Infusion for DKA  6 Units/hr Intravenous Continuous Trace Nathan M.D. 24 mL/hr at 10/14/19 0100 6 Units/hr at 10/14/19 0100   • Respiratory Care per Protocol   Nebulization Continuous RT Jalen Boyle M.D.       • D10%-0.45% NaCl infusion   Intravenous Continuous Trace Nathan M.D.   Stopped at 10/13/19 2030   • MD ALERT-PHARMACY TO CONSULT FOR DKA MONITORING 1 Each  1 Each Other PRN Trace Nathan M.D.       • magnesium sulfate IVPB premix 2 g  2 g Intravenous Once PRN Trace Nathan M.D.        Or   • magnesium sulfate IVPB premix 4 g  4 g Intravenous Once PRN Trace Nathan  M.D.       • potassium phosphates 30 mmol in  mL ivpb  30 mmol Intravenous Once PRN Trace Nathan M.D.        Or   • sodium phosphate 30 mmol in 1/2  mL ivpb  30 mmol Intravenous Once PRN Trace Nathan M.D.       • Adult DKA potassium(K+) replacement scale  1 Each Intravenous Q4HRS Trace Nathan M.D.   1 Each at 10/13/19 2200   • lactated ringers infusion  1,000 mL Intravenous Continuous Trace Nathan M.D.   Stopped at 10/13/19 2359   • D5LR infusion   Intravenous Continuous Trace Nathan M.D. 100 mL/hr at 10/13/19 2359     • enoxaparin (LOVENOX) inj 40 mg  40 mg Subcutaneous DAILY Trace Nathan M.D.       • ondansetron (ZOFRAN) syringe/vial injection 4 mg  4 mg Intravenous Q4HRS PRN Trace Nathan M.D.           Allergies  Allergies   Allergen Reactions   • Pcn [Penicillins] Shortness of Breath and Swelling     Per patient's Mom, patient tolerates Keflex   • Lisinopril Unspecified     Per historical: Reported pedal swelling. No facial/angioedema or rash nor respiratory symptoms.    • Promethazine Vomiting       Vital Signs last 24 hours  Temp:  [35.7 °C (96.3 °F)-36.6 °C (97.9 °F)] 36.6 °C (97.9 °F)  Pulse:  [132-155] 155  Resp:  [] 23  BP: ()/(67-83) 103/67  SpO2:  [98 %-100 %] 100 %    Physical Exam  Physical Exam   Constitutional: She appears well-developed and well-nourished. She appears lethargic. She appears toxic. She appears distressed.   HENT:   Head: Normocephalic and atraumatic.   Mouth/Throat: Mucous membranes are dry and not cyanotic.   Eyes: Pupils are equal, round, and reactive to light. No scleral icterus.   Neck: Neck supple. No JVD present. No thyromegaly present.   Healed scars from what appeared to be multiple prior central lines   Cardiovascular: Regular rhythm and intact distal pulses.  No extrasystoles are present. Tachycardia present. Exam reveals no gallop and no friction rub.   No murmur heard.  Sinus tachycardia   Pulmonary/Chest:  Accessory muscle usage present. Tachypnea (kusmaul respirations) noted. She has no wheezes. She has no rhonchi. She has no rales.   Right anterior chest port that is been accessed   Abdominal: Soft. She exhibits distension. She exhibits no fluid wave and no mass. Bowel sounds are decreased. There is no hepatosplenomegaly. There is no tenderness. There is no rigidity, no rebound, no guarding, no CVA tenderness, no tenderness at McBurney's point and negative Urrutia's sign.   Musculoskeletal: She exhibits no edema.   Neurological: She has normal strength. She appears lethargic. She displays no atrophy and no tremor. No cranial nerve deficit or sensory deficit. She exhibits normal muscle tone. She displays no seizure activity. GCS eye subscore is 3. GCS verbal subscore is 1. GCS motor subscore is 6.   Follows simple commands slowly   Skin: No rash noted. She is diaphoretic. No cyanosis. Nails show no clubbing.   Psychiatric:   Unable to assess   Vitals reviewed.      Fluids    Intake/Output Summary (Last 24 hours) at 10/14/2019 0152  Last data filed at 10/14/2019 0001  Gross per 24 hour   Intake 2191.5 ml   Output 0 ml   Net 2191.5 ml       Laboratory  Recent Results (from the past 48 hour(s))   ACCU-CHEK GLUCOSE    Collection Time: 10/13/19  5:53 PM   Result Value Ref Range    Glucose - Accu-Ck 527 (HH) 65 - 99 mg/dL   CBC WITH DIFFERENTIAL    Collection Time: 10/13/19  6:15 PM   Result Value Ref Range    WBC 24.9 (H) 4.8 - 10.8 K/uL    RBC 5.57 (H) 4.20 - 5.40 M/uL    Hemoglobin 13.4 12.0 - 16.0 g/dL    Hematocrit 47.2 (H) 37.0 - 47.0 %    MCV 84.7 81.4 - 97.8 fL    MCH 24.1 (L) 27.0 - 33.0 pg    MCHC 28.4 (L) 33.6 - 35.0 g/dL    RDW 46.1 35.9 - 50.0 fL    Platelet Count 470 (H) 164 - 446 K/uL    MPV 10.8 9.0 - 12.9 fL    Neutrophils-Polys 76.30 (H) 44.00 - 72.00 %    Lymphocytes 16.30 (L) 22.00 - 41.00 %    Monocytes 4.70 0.00 - 13.40 %    Eosinophils 0.20 0.00 - 6.90 %    Basophils 0.60 0.00 - 1.80 %    Immature  Granulocytes 1.90 (H) 0.00 - 0.90 %    Nucleated RBC 0.00 /100 WBC    Neutrophils (Absolute) 18.98 (H) 2.00 - 7.15 K/uL    Lymphs (Absolute) 4.05 1.00 - 4.80 K/uL    Monos (Absolute) 1.17 (H) 0.00 - 0.85 K/uL    Eos (Absolute) 0.05 0.00 - 0.51 K/uL    Baso (Absolute) 0.15 (H) 0.00 - 0.12 K/uL    Immature Granulocytes (abs) 0.46 (H) 0.00 - 0.11 K/uL    NRBC (Absolute) 0.00 K/uL    Anisocytosis 1+     Microcytosis 1+    COMP METABOLIC PANEL    Collection Time: 10/13/19  6:15 PM   Result Value Ref Range    Sodium 140 135 - 145 mmol/L    Potassium 4.6 3.6 - 5.5 mmol/L    Chloride 104 96 - 112 mmol/L    Co2 <5 (LL) 20 - 33 mmol/L    Anion Gap 31.0 (H) 0.0 - 11.9    Glucose 660 (HH) 65 - 99 mg/dL    Bun 24 (H) 8 - 22 mg/dL    Creatinine 1.30 0.50 - 1.40 mg/dL    Calcium 9.1 8.5 - 10.5 mg/dL    AST(SGOT) 12 12 - 45 U/L    ALT(SGPT) 10 2 - 50 U/L    Alkaline Phosphatase 143 (H) 30 - 99 U/L    Total Bilirubin 0.2 0.1 - 1.5 mg/dL    Albumin 4.6 3.2 - 4.9 g/dL    Total Protein 8.0 6.0 - 8.2 g/dL    Globulin 3.4 1.9 - 3.5 g/dL    A-G Ratio 1.4 g/dL   PHOSPHORUS    Collection Time: 10/13/19  6:15 PM   Result Value Ref Range    Phosphorus 7.2 (H) 2.5 - 4.5 mg/dL   HEMOGLOBIN A1C    Collection Time: 10/13/19  6:15 PM   Result Value Ref Range    Glycohemoglobin 14.0 (H) 0.0 - 5.6 %    Est Avg Glucose 355 mg/dL   ESTIMATED GFR    Collection Time: 10/13/19  6:15 PM   Result Value Ref Range    GFR If  58 (A) >60 mL/min/1.73 m 2    GFR If Non  48 (A) >60 mL/min/1.73 m 2   PERIPHERAL SMEAR REVIEW    Collection Time: 10/13/19  6:15 PM   Result Value Ref Range    Peripheral Smear Review see below    PLATELET ESTIMATE    Collection Time: 10/13/19  6:15 PM   Result Value Ref Range    Plt Estimation Increased    MORPHOLOGY    Collection Time: 10/13/19  6:15 PM   Result Value Ref Range    RBC Morphology Present     Polychromia 1+    DIFFERENTIAL COMMENT    Collection Time: 10/13/19  6:15 PM   Result Value Ref  Range    Comments-Diff see below    MAGNESIUM    Collection Time: 10/13/19  6:15 PM   Result Value Ref Range    Magnesium 2.2 1.5 - 2.5 mg/dL   Lactic acid (lactate)    Collection Time: 10/13/19  6:18 PM   Result Value Ref Range    Lactic Acid 2.0 0.5 - 2.0 mmol/L   HCG Qual Serum    Collection Time: 10/13/19  6:18 PM   Result Value Ref Range    Beta-Hcg Qualitative Serum Negative Negative   BETA-HYDROXYBUTYRIC ACID    Collection Time: 10/13/19  6:18 PM   Result Value Ref Range    beta-Hydroxybutyric Acid >8.00 (H) 0.02 - 0.27 mmol/L   EKG    Collection Time: 10/13/19  7:00 PM   Result Value Ref Range    Report       [facility] Emergency Dept.    Test Date:  2019-10-13  Pt Name:    AMANDA BRANCH            Department: ER  MRN:        [id number]                      Room:       [facility]  Gender:     Female                       Technician: [id number]  :        1990                   Requested By:ASHELY SUAREZ  Order #:    [id number]                    Reading MD:    Measurements  Intervals                                Axis  Rate:       140                          P:          68  CO:         136                          QRS:        17  QRSD:       74                           T:          75  QT:         288  QTc:        440    Interpretive Statements  PACEMAKER SPIKES OR ARTIFACTS  SINUS TACHYCARDIA  PROBABLE LEFT ATRIAL ABNORMALITY  BORDERLINE T ABNORMALITIES, ANT-LAT LEADS  Compared to ECG 2019 11:24:30  T-wave abnormality now present     URINALYSIS    Collection Time: 10/13/19  7:57 PM   Result Value Ref Range    Color Yellow     Character Clear     Specific Gravity 1.026 <1.035    Ph 5.0 5.0 - 8.0    Glucose >=1000 (A) Negative mg/dL    Ketones >=160 Negative mg/dL    Protein 100 (A) Negative mg/dL    Bilirubin Negative Negative    Urobilinogen, Urine 0.2 Negative    Nitrite Negative Negative    Leukocyte Esterase Negative Negative    Occult Blood Negative Negative    Micro Urine  Req Microscopic    URINE MICROSCOPIC (W/UA)    Collection Time: 10/13/19  7:57 PM   Result Value Ref Range    WBC 0-2 /hpf    RBC 5-10 (A) /hpf    Bacteria Negative None /hpf    Epithelial Cells Negative /hpf    Hyaline Cast 0-2 /lpf   ACCU-CHEK GLUCOSE    Collection Time: 10/13/19  9:04 PM   Result Value Ref Range    Glucose - Accu-Ck 440 (HH) 65 - 99 mg/dL   Lactic acid (lactate): Repeat if initial lactic acid result is greater than 2    Collection Time: 10/13/19  9:38 PM   Result Value Ref Range    Lactic Acid 1.5 0.5 - 2.0 mmol/L   Basic Metabolic Panel (BMP)    Collection Time: 10/13/19  9:38 PM   Result Value Ref Range    Sodium 147 (H) 135 - 145 mmol/L    Potassium 4.3 3.6 - 5.5 mmol/L    Chloride 119 (H) 96 - 112 mmol/L    Co2 <5 (LL) 20 - 33 mmol/L    Glucose 383 (H) 65 - 99 mg/dL    Bun 23 (H) 8 - 22 mg/dL    Creatinine 1.09 0.50 - 1.40 mg/dL    Calcium 8.1 (L) 8.5 - 10.5 mg/dL    Anion Gap 23.0 (H) 0.0 - 11.9   ESTIMATED GFR    Collection Time: 10/13/19  9:38 PM   Result Value Ref Range    GFR If African American >60 >60 mL/min/1.73 m 2    GFR If Non African American 59 (A) >60 mL/min/1.73 m 2       Imaging  DX-CHEST-PORTABLE (1 VIEW)   Final Result      No acute cardiopulmonary process is seen.      Linear metallic density projects over the right lung base. Clinical correlation recommended.             Assessment/Plan  * Diabetic ketoacidosis with coma associated with type 1 diabetes mellitus (HCC)  Assessment & Plan  Recurrent severe DKA with coma  Admit to ICU  2 L fluid bolus in ER, 1 L saline and 1 L LR  DKA protocols  Early initiation IV insulin with severe acidosis/DKA  Continue hourly blood sugars  Strict n.p.o.  Optimize electrolytes, potassium/magnesium/phosphorus in particular  Add dextrose when blood sugar around 250 to provide substrate to clear ketoacidosis  PIV and port being used, hope to avoid placing a CVC    Leukocytosis  Assessment & Plan  White count 24.9 which is significantly  elevated  Afebrile without obvious source of infection  Lactic acid normal, normotensive  Chest x-ray clear  Skin in particular area report does not suggest cellulitis or cutaneous infection  UA negative  Negative ROS with mother, patient to go home from work yesterday with malaise  Check lipase if develops significant nausea vomiting, abdomen benign now  Blood culture sent in ER  Hold on empiric antibiotics for now, monitoring closely with low threshold to treat  If deteriorates would treat with vancomycin and Zosyn to cover GI tract, resistant gram negatives and resistant gram positives    Gastroparesis due to DM ([facility])- (present on admission)  Assessment & Plan  Not clinically apparent as of yet  When patient awake alert and able to respond will review what pharmacologically agents help  Zofran/Reglan for now if QTC okay  Optimize electrolytes    Diabetes type I (CMS-HCC)- (present on admission)  Assessment & Plan  Long-term type 1 diabetes  Multiple admits for DKA, very brittle patient  Mom states she is very compliant but hemoglobin A1c of 14 suggest that is not the case  Diabetic education when clinically appropriate    Thrombocytosis (HCC)  Assessment & Plan  Mild, felt secondary to hemoconcentration, serial CBC to monitor    Port-A-Cath in place  Assessment & Plan  Patient with multiple admits for DKA and diabetic complication gastroparesis  Difficult IV access noted in the past  Status post right anterior chest port  No signs of infection, monitoring    Elevated alkaline phosphatase level  Assessment & Plan  Mildly elevated alkaline phosphatase, unclear etiology, present in the past  Serial CMP  Further evaluation is clinically prudent      Discussed patient condition and risk of morbidity and/or mortality with Family, RN, Pharmacy, [employer] [facility] resident, Patient and ERP.    The patient remains critically ill.  Critical care time = 50 minutes in directly providing and coordinating critical care and extensive  data review.  No time overlap and excludes procedures.

## 2019-10-14 NOTE — ED NOTES
Unable to complete med rec. Per mother, pt fills medications at [facility] and [facility] pharmacy. Pt unable to provide information at this time. Med tech will attempt to follow up with pharmacies tomorrow.

## 2019-10-14 NOTE — ASSESSMENT & PLAN NOTE
Patient with multiple admits for DKA and diabetic complication gastroparesis  Difficult IV access noted in the past  Status post right anterior chest port  No signs of infection, monitoring

## 2019-10-14 NOTE — H&P
"      Internal Medicine Admitting History and Physical    Note Author: Trace Nathan M.D.       Name Alis Sparks     1990   Age/Sex 29 y.o. female   MRN [id number]   Code Status Full     After 5PM or if no immediate response to page, please call for cross-coverage  Attending/Team: ETHEL Georges See Patient List for primary contact information  Call [phone] to page    1st Call  Dr. Tiwari        Chief Complaint:   Altered LOC, nausea/vomiting/malaise    HPI:  Ms. Sparks is a 29 year old woman with type-1 diabetes complicated by gastroparesis who was found by her mother this evening with altered LOC. Yesterday she left work early as she was feeling \"dizzy\" and vaguely unwell, however she was not complaining of any focal symptoms and had normal mentation per her mother. This morning her mother reports the patient seemed fine around 6am, however when she returned from work she found her to be extremely lethargic and difficult to arouse, and she had vomit on herself. At the time of interview she is difficult to arouse but opens eyes and follows commands. She is unable to describe any specific symptoms. Per her mother she does not drink alcohol or use any recreational drugs, unlikely to be pregnant, has not had any recent illnesses other than general malaise yesterday, has not had any known sick contacts, was not complaining of any dysuria or diarrhea. She works as a  at [employer]. She has had frequent episodes of DKA requiring many admissions, on her last admission and venous access port was placed due to poor venous access.     Review of Systems   Unable to perform ROS: Mental status change             Past Medical History (Chronic medical problem, known complications and current treatment)    Past Medical History:   Diagnosis Date   • Backpain    • Bronchitis    • Diabetes type 1, controlled (HCC)     tests 4-5 times daily   • DKA (diabetic ketoacidoses) ([facility])    • Fall    • " Gastroparesis    • Kidney infection    • Pneumonia    • UTI (urinary tract infection)           Past Surgical History:  Past Surgical History:   Procedure Laterality Date   • GASTROSCOPY-ENDO N/A 6/9/2018    Procedure: GASTROSCOPY-ENDO WITH BIOPSIES AND DIALATION;  Surgeon: Marino Black M.D.;  Location: SURGERY [facility];  Service: Gastroenterology   • SUBMANDIBLE ABSCESS INCISION AND DRAINAGE Left 11/8/2017    Procedure: SUBMANDIBLE ABSCESS INCISION AND DRAINAGE;  Surgeon: Osman Young M.D.;  Location: SURGERY [facility];  Service: Dental   • DENTAL EXTRACTION(S)  11/8/2017    Procedure: DENTAL EXTRACTION(S);  Surgeon: Osman Young M.D.;  Location: SURGERY [facility];  Service: Dental   • GASTROSCOPY-ENDO  9/18/2014    Performed by Sylvain PERRY M.D. at ENDOSCOPY [facility]   • GASTROSCOPY WITH BIOPSY  9/18/2014    Performed by Sylvain PERRY M.D. at ENDOSCOPY [facility]   • OTHER      surgery to patch lung after pneumor from central line placement       Current Outpatient Medications:  Home Medications    **Home medications have not yet been reviewed for this encounter**         Medication Allergy/Sensitivities:  Allergies   Allergen Reactions   • Pcn [Penicillins] Shortness of Breath and Swelling     Per patient's Mom, patient tolerates Keflex   • Lisinopril Unspecified     Per historical: Reported pedal swelling. No facial/angioedema or rash nor respiratory symptoms.    • Promethazine Vomiting         Family History (mandatory)   Family History   Problem Relation Age of Onset   • Cancer Unknown         breasts, multiple family members   • Hypertension Maternal Grandfather        Social History (mandatory)   Social History     Socioeconomic History   • Marital status: Single     Spouse name: Not on file   • Number of children: Not on file   • Years of education: Not on file   • Highest education level: Not on file   Occupational History   • Not on file   Social Needs   •  Financial resource strain: Not on file   • Food insecurity:     Worry: Not on file     Inability: Not on file   • Transportation needs:     Medical: Not on file     Non-medical: Not on file   Tobacco Use   • Smoking status: Never Smoker   • Smokeless tobacco: Never Used   Substance and Sexual Activity   • Alcohol use: No   • Drug use: No   • Sexual activity: Never   Lifestyle   • Physical activity:     Days per week: Not on file     Minutes per session: Not on file   • Stress: Not on file   Relationships   • Social connections:     Talks on phone: Not on file     Gets together: Not on file     Attends Denominational service: Not on file     Active member of club or organization: Not on file     Attends meetings of clubs or organizations: Not on file     Relationship status: Not on file   • Intimate partner violence:     Fear of current or ex partner: Not on file     Emotionally abused: Not on file     Physically abused: Not on file     Forced sexual activity: Not on file   Other Topics Concern   • Not on file   Social History Narrative   • Not on file     Living situation: Lives with parents  PCP : Bruna Laboy M.D.    Physical Exam     Vitals:    10/13/19 2005 10/13/19 2100 10/13/19 2105 10/13/19 2200   BP:    (!) 96/70   Pulse:  (!) 138 (!) 138 (!) 146   Resp:  (!) 24 (!) 129 (!) 24   Temp: 36.1 °C (97 °F)      TempSrc: Temporal      SpO2:  99% 99% 100%   Weight:       Height:         Body mass index is 26.63 kg/m².  O2 therapy: Pulse Oximetry: 100 %, O2 (LPM): 0, O2 Delivery: None (Room Air)    Physical Exam   Constitutional: She appears distressed.   Obtunded and ill appearing   HENT:   Head: Normocephalic and atraumatic.   Eyes: Pupils are equal, round, and reactive to light. No scleral icterus.   Neck: Neck supple. No JVD present. No tracheal deviation present.   Cardiovascular: Exam reveals no gallop and no friction rub.   No murmur heard.  Tachycardic, regular, no murmur   Pulmonary/Chest: No stridor.    Kussmaul breathing, lungs clear. Scars from previous central venous lines. Has venous access port on right anterior chest, no surrounding erythema or tenderness   Abdominal: Soft.   Diminished bowel sounds. Non-focal abdominal tenderness   Musculoskeletal: She exhibits no edema, tenderness or deformity.   Lymphadenopathy:     She has no cervical adenopathy.   Neurological:   Lethargic/obtunded, opens eyes on command and follows commands, replies with short answers   Skin:   Extremities cool to touch         Data Review       Old Records Request:   Completed  Current Records review/summary: Completed    Lab Data Review:  Recent Results (from the past 24 hour(s))   ACCU-CHEK GLUCOSE    Collection Time: 10/13/19  5:53 PM   Result Value Ref Range    Glucose - Accu-Ck 527 (HH) 65 - 99 mg/dL   CBC WITH DIFFERENTIAL    Collection Time: 10/13/19  6:15 PM   Result Value Ref Range    WBC 24.9 (H) 4.8 - 10.8 K/uL    RBC 5.57 (H) 4.20 - 5.40 M/uL    Hemoglobin 13.4 12.0 - 16.0 g/dL    Hematocrit 47.2 (H) 37.0 - 47.0 %    MCV 84.7 81.4 - 97.8 fL    MCH 24.1 (L) 27.0 - 33.0 pg    MCHC 28.4 (L) 33.6 - 35.0 g/dL    RDW 46.1 35.9 - 50.0 fL    Platelet Count 470 (H) 164 - 446 K/uL    MPV 10.8 9.0 - 12.9 fL    Neutrophils-Polys 76.30 (H) 44.00 - 72.00 %    Lymphocytes 16.30 (L) 22.00 - 41.00 %    Monocytes 4.70 0.00 - 13.40 %    Eosinophils 0.20 0.00 - 6.90 %    Basophils 0.60 0.00 - 1.80 %    Immature Granulocytes 1.90 (H) 0.00 - 0.90 %    Nucleated RBC 0.00 /100 WBC    Neutrophils (Absolute) 18.98 (H) 2.00 - 7.15 K/uL    Lymphs (Absolute) 4.05 1.00 - 4.80 K/uL    Monos (Absolute) 1.17 (H) 0.00 - 0.85 K/uL    Eos (Absolute) 0.05 0.00 - 0.51 K/uL    Baso (Absolute) 0.15 (H) 0.00 - 0.12 K/uL    Immature Granulocytes (abs) 0.46 (H) 0.00 - 0.11 K/uL    NRBC (Absolute) 0.00 K/uL    Anisocytosis 1+     Microcytosis 1+    COMP METABOLIC PANEL    Collection Time: 10/13/19  6:15 PM   Result Value Ref Range    Sodium 140 135 - 145 mmol/L     Potassium 4.6 3.6 - 5.5 mmol/L    Chloride 104 96 - 112 mmol/L    Co2 <5 (LL) 20 - 33 mmol/L    Anion Gap 31.0 (H) 0.0 - 11.9    Glucose 660 (HH) 65 - 99 mg/dL    Bun 24 (H) 8 - 22 mg/dL    Creatinine 1.30 0.50 - 1.40 mg/dL    Calcium 9.1 8.5 - 10.5 mg/dL    AST(SGOT) 12 12 - 45 U/L    ALT(SGPT) 10 2 - 50 U/L    Alkaline Phosphatase 143 (H) 30 - 99 U/L    Total Bilirubin 0.2 0.1 - 1.5 mg/dL    Albumin 4.6 3.2 - 4.9 g/dL    Total Protein 8.0 6.0 - 8.2 g/dL    Globulin 3.4 1.9 - 3.5 g/dL    A-G Ratio 1.4 g/dL   PHOSPHORUS    Collection Time: 10/13/19  6:15 PM   Result Value Ref Range    Phosphorus 7.2 (H) 2.5 - 4.5 mg/dL   ESTIMATED GFR    Collection Time: 10/13/19  6:15 PM   Result Value Ref Range    GFR If  58 (A) >60 mL/min/1.73 m 2    GFR If Non  48 (A) >60 mL/min/1.73 m 2   PERIPHERAL SMEAR REVIEW    Collection Time: 10/13/19  6:15 PM   Result Value Ref Range    Peripheral Smear Review see below    PLATELET ESTIMATE    Collection Time: 10/13/19  6:15 PM   Result Value Ref Range    Plt Estimation Increased    MORPHOLOGY    Collection Time: 10/13/19  6:15 PM   Result Value Ref Range    RBC Morphology Present     Polychromia 1+    DIFFERENTIAL COMMENT    Collection Time: 10/13/19  6:15 PM   Result Value Ref Range    Comments-Diff see below    MAGNESIUM    Collection Time: 10/13/19  6:15 PM   Result Value Ref Range    Magnesium 2.2 1.5 - 2.5 mg/dL   Lactic acid (lactate)    Collection Time: 10/13/19  6:18 PM   Result Value Ref Range    Lactic Acid 2.0 0.5 - 2.0 mmol/L   HCG Qual Serum    Collection Time: 10/13/19  6:18 PM   Result Value Ref Range    Beta-Hcg Qualitative Serum Negative Negative   BETA-HYDROXYBUTYRIC ACID    Collection Time: 10/13/19  6:18 PM   Result Value Ref Range    beta-Hydroxybutyric Acid >8.00 (H) 0.02 - 0.27 mmol/L   EKG    Collection Time: 10/13/19  7:00 PM   Result Value Ref Range    Report       [facility]    Test Date:   2019-10-13  Pt Name:    AMANDA BRANCH            Department: ER  MRN:        [id number]                      Room:       [facility]  Gender:     Female                       Technician: [id number]  :        1990                   Requested By:ASHELY SUAREZ  Order #:    [id number]                    Reading MD:    Measurements  Intervals                                Axis  Rate:       140                          P:          68  MO:         136                          QRS:        17  QRSD:       74                           T:          75  QT:         288  QTc:        440    Interpretive Statements  PACEMAKER SPIKES OR ARTIFACTS  SINUS TACHYCARDIA  PROBABLE LEFT ATRIAL ABNORMALITY  BORDERLINE T ABNORMALITIES, ANT-LAT LEADS  Compared to ECG 2019 11:24:30  T-wave abnormality now present     URINALYSIS    Collection Time: 10/13/19  7:57 PM   Result Value Ref Range    Color Yellow     Character Clear     Specific Gravity 1.026 <1.035    Ph 5.0 5.0 - 8.0    Glucose >=1000 (A) Negative mg/dL    Ketones >=160 Negative mg/dL    Protein 100 (A) Negative mg/dL    Bilirubin Negative Negative    Urobilinogen, Urine 0.2 Negative    Nitrite Negative Negative    Leukocyte Esterase Negative Negative    Occult Blood Negative Negative    Micro Urine Req Microscopic    URINE MICROSCOPIC (W/UA)    Collection Time: 10/13/19  7:57 PM   Result Value Ref Range    WBC 0-2 /hpf    RBC 5-10 (A) /hpf    Bacteria Negative None /hpf    Epithelial Cells Negative /hpf    Hyaline Cast 0-2 /lpf   ACCU-CHEK GLUCOSE    Collection Time: 10/13/19  9:04 PM   Result Value Ref Range    Glucose - Accu-Ck 440 (HH) 65 - 99 mg/dL   Lactic acid (lactate): Repeat if initial lactic acid result is greater than 2    Collection Time: 10/13/19  9:38 PM   Result Value Ref Range    Lactic Acid 1.5 0.5 - 2.0 mmol/L   Basic Metabolic Panel (BMP)    Collection Time: 10/13/19  9:38 PM   Result Value Ref Range    Sodium 147 (H) 135 - 145 mmol/L    Potassium 4.3  3.6 - 5.5 mmol/L    Chloride 119 (H) 96 - 112 mmol/L    Co2 <5 (LL) 20 - 33 mmol/L    Glucose 383 (H) 65 - 99 mg/dL    Bun 23 (H) 8 - 22 mg/dL    Creatinine 1.09 0.50 - 1.40 mg/dL    Calcium 8.1 (L) 8.5 - 10.5 mg/dL    Anion Gap 23.0 (H) 0.0 - 11.9   ESTIMATED GFR    Collection Time: 10/13/19  9:38 PM   Result Value Ref Range    GFR If African American >60 >60 mL/min/1.73 m 2    GFR If Non African American 59 (A) >60 mL/min/1.73 m 2       Imaging/Procedures Review:    Independant Imaging Review: Completed  DX-CHEST-PORTABLE (1 VIEW)   Final Result      No acute cardiopulmonary process is seen.      Linear metallic density projects over the right lung base. Clinical correlation recommended.              EKG:   EKG Independent Review: Completed  QTc:440, HR: 140, sinus tachycardia, no ST/T changes     Records reviewed and summarized in current documentation :  Yes  [employer] teaching service handout given to patient:  No         Assessment/Plan     * Diabetic ketoacidosis with coma associated with type 1 diabetes mellitus (HCC)  Assessment & Plan  Glu 527 on presentation. Bicarb <5, BHB >8, AG 31, lactic acid 2.0. Patient has had frequent episodes DKA with many hospital admissions. No clear precipitating factor for this episode of DKA. CXR does not suggest pneumonia, urinalysis not suggestive of UTI, patient does have some apparent abdominal discomfort but no specific complaints. B-hcg negative. When patient's mental status improves will attempt a full ROS.  Patient has received 2L fluid bolus of NS in ED.   - IV regular insulin at 0.1 U/kg  - LR at 250mL/hr, switch to D5 LR when Glu <250  - q4h BMP, q8h mag/phos  - Control nausea/vomiting  - NPO with ice chips  - Blood cultures, specimens from port and peripheral sites    Gastroparesis due to DM (HCC)- (present on admission)  Assessment & Plan  Treat DKA and control nausea/vomiting.     Diabetes type I (CMS-HCC)- (present on admission)  Assessment & Plan  See diabetic  ketoacidosis    Leukocytosis  Assessment & Plan  WBC 24.9. No focal signs of infection, leukocytosis possibly attributable to DKA/stress reaction/dehydration, however no known precipitating factor for this episode so will continue to evaluate for infection. CXR and UA not suggestive of infection.   - Continue to monitor, follow blood cultures.      Anticipated Hospital stay:  >2 midnights        Quality Measures  Quality-Core Measures   Reviewed items::  EKG reviewed, Labs reviewed, Medications reviewed and Radiology images reviewed  Pitt catheter::  No Pitt  DVT prophylaxis pharmacological::  Enoxaparin (Lovenox)    PCP: Bruna Laboy M.D.

## 2019-10-14 NOTE — ASSESSMENT & PLAN NOTE
Mildly elevated alkaline phosphatase, unclear etiology, present in the past  Serial CMP  Further evaluation is clinically prudent

## 2019-10-14 NOTE — ASSESSMENT & PLAN NOTE
Long-term type 1 diabetes  Multiple admits for DKA, very brittle patient  Reports excellent med compliance and switching injection sites  Mom states she is very compliant but hemoglobin A1c of 14 suggest that is not the case.    Diabetic education when clinically appropriate  Needs referral for outpatient endocrinologist and new glucometer on discharge  Seen at [facility]

## 2019-10-14 NOTE — PROGRESS NOTES
[facility] ICU Progress Note      Admit Date: 10/13/2019    Resident(s): Lashonda Tiwari  Attending: ETHEL BRANNON/ Dr. Rausch    Date & Time:   10/14/2019   11:29 AM       Patient ID:    Name:             Alis Sparks   YOB: 1990  Age:                 29 y.o.  female   MRN:               [id number]    HPI:  The patient is a 29 year old female with PMHx of Gastroparesis,Type 1 DM, Multiple admissions in the past for DKA presented to the ER found by her mother being altered.  In the ER patient was found to have high Blood Sugars with High anion gap metabolic acidosis.  Patient is admitted to the ICU for DKA and close monitoring     Consultants:     PMA:     Interval Events:  - Patient was admitted overnight for DKA   - Overnight Patient remains Afebrile, SR--130's, -110's  - increased the insulin rate from 6 to 7 units per hour  - increased the rate of D10 1/2 NS from 100 cc to 150 cc hr  - resume reglan for gastroparesis   - Gap is 19 with Glucose 160 Bicarb 11   - Urine is noticed - cloudy , repeat UA is ordered  - Troponin, UDS, Influenza is ordered    Review of Systems   Constitutional: Negative for chills and fever.   HENT: Negative for congestion.    Eyes: Negative for blurred vision.   Respiratory: Negative for cough and shortness of breath.    Cardiovascular: Negative for chest pain, palpitations and leg swelling.   Gastrointestinal: Positive for abdominal pain. Negative for heartburn, nausea and vomiting.   Genitourinary: Negative for flank pain.   Musculoskeletal: Negative for myalgias.   Neurological: Negative for dizziness, speech change, focal weakness and headaches.   Endo/Heme/Allergies: Negative for environmental allergies.   Psychiatric/Behavioral: The patient is not nervous/anxious.        PHYSICAL EXAM  Vitals:    10/14/19 0400 10/14/19 0500 10/14/19 0600 10/14/19 0700   BP: 104/67 106/75 118/77 114/58   Pulse: (!) 117 (!) 122 (!) 125 (!) 122   Resp: 17 (!) 29 19  "(!) 23   Temp:       TempSrc:       SpO2: 99% 99% 100% 100%   Weight: 69.6 kg (153 lb 7 oz)      Height:         Body mass index is 24.77 kg/m².  /58   Pulse (!) 122   Temp 36.6 °C (97.9 °F) (Temporal)   Resp (!) 23   Ht 1.676 m (5' 6\")   Wt 69.6 kg (153 lb 7 oz)   SpO2 100%   BMI 24.77 kg/m²   O2 therapy: Pulse Oximetry: 100 %, O2 (LPM): 0, O2 Delivery: None (Room Air)    Physical Exam   Constitutional: She is oriented to person, place, and time. No distress.   HENT:   Head: Normocephalic and atraumatic.   Dry mucus membranes    Eyes: Pupils are equal, round, and reactive to light. Conjunctivae and EOM are normal.   Neck: Normal range of motion. Neck supple. No JVD present.   Cardiovascular: Regular rhythm.   No murmur heard.  Sinus tachycardia    Pulmonary/Chest: Effort normal and breath sounds normal. No respiratory distress.   Abdominal: Soft. Bowel sounds are normal. She exhibits no distension. There is tenderness.   Musculoskeletal: Normal range of motion. She exhibits no edema.   Lymphadenopathy:     She has no cervical adenopathy.   Neurological: She is alert and oriented to person, place, and time. No cranial nerve deficit.   Skin: Skin is warm and dry. She is not diaphoretic.   Psychiatric: Affect normal.   Nursing note and vitals reviewed.      Respiratory:     Respiration: (!) 23, Pulse Oximetry: 100 %, O2 Daily Delivery Respiratory : Room Air with O2 Available    Chest Tube Drains:          HemoDynamics:  Pulse: (!) 122 Blood Pressure: 114/58      Neuro:   Alert and oriented x 4  No focal neurological deficits noted     Fluids:       Intake/Output Summary (Last 24 hours) at 10/14/2019 0948  Last data filed at 10/14/2019 0600  Gross per 24 hour   Intake 3683.84 ml   Output 1500 ml   Net 2183.84 ml       Weight: 69.6 kg (153 lb 7 oz)  Body mass index is 24.77 kg/m².    Recent Labs     10/13/19  1815 10/13/19  2138 10/14/19  0202 10/14/19  0605   SODIUM 140 147* 147* 146*   POTASSIUM 4.6 4.3 " 4.2 3.1*   CHLORIDE 104 119* 124* 116*   CO2 <5* <5* 8* 11*   BUN 24* 23* 20 16   CREATININE 1.30 1.09 0.81 0.61   MAGNESIUM 2.2  --  1.8  --    PHOSPHORUS 7.2*  --  1.0*  --    CALCIUM 9.1 8.1* 8.7 7.2*       GI/Nutrition:  Recent Labs     10/13/19  1815 10/13/19  2138 10/14/19  0202 10/14/19  0605   ALTSGPT 10  --   --   --    ASTSGOT 12  --   --   --    ALKPHOSPHAT 143*  --   --   --    TBILIRUBIN 0.2  --   --   --    LIPASE  --   --  26  --    GLUCOSE 660* 383* 192* 160*       Heme:  Recent Labs     10/13/19  1815   RBC 5.57*   HEMOGLOBIN 13.4   HEMATOCRIT 47.2*   PLATELETCT 470*       Infectious Disease:  Monitored Temp 2  Av.3 °C (99.2 °F)  Min: 36.7 °C (98.1 °F)  Max: 37.5 °C (99.5 °F)  Temp  Av.2 °C (97.1 °F)  Min: 35.7 °C (96.3 °F)  Max: 36.6 °C (97.9 °F)  Recent Labs     10/13/19  1815   WBC 24.9*   NEUTSPOLYS 76.30*   LYMPHOCYTES 16.30*   MONOCYTES 4.70   EOSINOPHILS 0.20   BASOPHILS 0.60   ASTSGOT 12   ALTSGPT 10   ALKPHOSPHAT 143*   TBILIRUBIN 0.2       Meds:  • morphine injection  2 mg     • potassium phosphate ivpb  30 mmol      Or   • sodium phosphate 30 mmol ivpb  30 mmol     • sodium phosphate ivpb  30 mmol 30 mmol (10/14/19 1028)   • pantoprazole  20 mg     • metoclopramide  10 mg     • insulin infusion (for DKA ONLY)  6 Units/hr 7 Units/hr (10/14/19 0945)   • Respiratory Care per Protocol       • D10%-0.45% NaCl   150 mL/hr at 10/14/19 1028   • MD ALERT-PHARMACY TO CONSULT  1 Each     • Adult DKA potassium(K+) replacement scale  1 Each     • LR  1,000 mL Stopped (10/13/19 9164)   • D5LR   Stopped (10/14/19 4518)   • enoxaparin  40 mg     • ondansetron  4 mg          Imaging:  DX-CHEST-PORTABLE (1 VIEW)   Final Result      No acute cardiopulmonary process is seen.      Linear metallic density projects over the right lung base. Clinical correlation recommended.             Assessment and Plan:      * Diabetic ketoacidosis with coma associated with type 1 diabetes mellitus (HCC)- (present  on admission)  Assessment & Plan  Glu 527 on presentation. Bicarb <5, BHB >8, AG 31, lactic acid 2.0. Patient has had frequent episodes DKA with many hospital admissions. No clear precipitating factor for this episode of DKA. CXR does not suggest pneumonia, urinalysis not suggestive of UTI, patient does have some apparent abdominal discomfort but no specific complaints. B-hcg negative.   Patient has received 2L fluid bolus of NS in ED.   - IV regular insulin at 0.1 U/kg- increased insulin rate from 6 units to 7 units per hour  - increased D10 1/2 NS from 100cc/hr to 150 cc/hr  - q4h BMP, q8h mag/phos  - NPO with ice chips  - Blood cultures, specimens from port and peripheral sites- pending  - troponin, urine drug screen, urinalysis,influenza is pending       Hypophosphatemia  Assessment & Plan  - low phosphate this am  - replete as needed  - CTM  - 2/2 DKA     Leukocytosis- (present on admission)  Assessment & Plan  WBC 24.9. No focal signs of infection, leukocytosis possibly attributable to DKA/stress reaction/dehydration, however no known precipitating factor for this episode so will continue to evaluate for infection. CXR and UA not suggestive of infection.   - Continue to monitor, follow blood cultures.    Gastroparesis due to DM (HCC)- (present on admission)  Assessment & Plan  - resume reglan home medication  - QTC is normal     Hypokalemia- (present on admission)  Assessment & Plan  - Decreased Potassium on BMP this am 3.1  - Replete as needed  - 2/2 DKA     Diabetes type I (CMS-HCC)- (present on admission)  Assessment & Plan  See diabetic ketoacidosis    Thrombocytosis (HCC)- (present on admission)  Assessment & Plan  - elevated platelet count 470  - likely secondary to dehydration from DKA  - CTM    Elevated alkaline phosphatase level- (present on admission)  Assessment & Plan  - elevated ALP   - Normal AST/ALT  - May be vitamin D deficiency       DISPO: ICU for DKA     CODE STATUS: FULL     Quality  Measures:  Pitt Catheter: Yes   DVT Prophylaxis: Lovenox  Ulcer Prophylaxis: Omeprazole   Antibiotics: n/a  Lines: PIV  Port A cath in place     This note was created using voice recognition software. There may be unintended errors in spelling, grammar or content.

## 2019-10-14 NOTE — CARE PLAN
Problem: Safety  Goal: Will remain free from falls  Intervention: Implement fall precautions  Flowsheets  Taken 10/14/2019 0544  Bed Alarm: Yes - Alarm On  Chair/Bed Strip Alarm: Yes - Alarm On  Taken 10/14/2019 0600  Environmental Precautions: Report Given to Other Health Care Providers Regarding Fall Risk;Bed in Low Position;Communication Sign for Patients & Families     Problem: Pain Management  Goal: Pain level will decrease to patient's comfort goal  Intervention: Follow pain managment plan developed in collaboration with patient and Interdisciplinary Team  Note:   Pt medicated per MAR

## 2019-10-14 NOTE — PROGRESS NOTES
Critical Care Progress Note    Date of admission  10/13/2019    Chief Complaint  29 y.o. female admitted 10/13/2019 with DKA    Hospital Course    29 y.o. female history of type 1 diabetes and most recent admit was July of this year for DKA, brittle type I diabetes with almost 20 admits for DKA complicated by gastroparesis but otherwise negative past medical history who presented 10/13/2019 with kusmaul respirations and altered LOC.  Blood sugar was found to be elevated at 660 and bicarb level was less than 5.  Beta hydroxybutyric acid also came back at greater than 8.  She received normal saline fluid bolus in the ER and a second liter bolus of lactated Ringer's.  She was started fairly quickly on insulin infusion.  Initial lactic acid level was 2.  She initially was very poorly responsive and get no verbal and minimal other response until after fluids were initiated and insulin started.  She was then able to move all extremities to command although weakly and in a delayed fashion.  She was also able to open eyes and lift her head off the bed weakly.  Patient has a right anterior chest port that looks okay and was accessed by the ER staff.  Patient is had so many admissions and difficulty with gastroparesis and vomiting that she had this port placed in the past for easy venous access.     Patient's mother is at the bedside and gave a history of the patient coming home in the afternoon yesterday bit early from work not feeling well.  She did not describe any specific symptoms beyond malaise.  This a.m. she was okay and responding.  When she returned from work about 12 hours later she was unresponsive and having kussmaul respirations.  She did state that her daughter had multiple admits that she had brittle diabetes.  Gastroparesis has been a problem as well and she is tried CBD oil without much benefit.  She does not smoke marijuana or abuse any other substances.  She thought she was fairly compliant however her  hemoglobin A1c is 14.      Interval Problem Update  Reviewed last 24 hour events:  Neuro: AXo4  HR: -150s  SBP: 100-130s  Tmax: AF  GI: NPO with ice chips  I/O: voiding  Lines: port, PIV  Resp: RA   Vte: lovenox  PPI/H2: add PPI, home med  Antibx: none      Insulin gtt at 6, gap widening  Repeat UA  Retaining urine so has gonzalez  Na phos 30 written, giving another 30  Increase K rate to 20meq per hour    Had an extended conversation about compliance and care.  In a good home situation.  Lives with parents and they have good relationship.  Denies abuse. Mom helps with meds.  Things going OK at work.  Needs an outpatient referral for an endocrinologist but seems motivated to see someone.  Reports she switched injection sites and hasn't missed any insulin doses.  She has had some glucometer malfunction recently so would like a new one.  Her fastin BG is usually 150-200 and later in the day 200-250.     Review of Systems  Review of Systems   Constitutional: Positive for chills and malaise/fatigue.   HENT: Positive for congestion and sore throat.    Respiratory: Positive for cough. Negative for shortness of breath.    Cardiovascular: Negative for palpitations and leg swelling.   Gastrointestinal: Positive for abdominal pain, diarrhea, nausea and vomiting.   Genitourinary: Negative for dysuria.   All other systems reviewed and are negative.       Vital Signs for last 24 hours   Temp:  [35.7 °C (96.3 °F)-36.6 °C (97.9 °F)] 36.6 °C (97.9 °F)  Pulse:  [117-155] 125  Resp:  [] 19  BP: ()/(67-83) 118/77  SpO2:  [98 %-100 %] 100 %    Hemodynamic parameters for last 24 hours       Respiratory Information for the last 24 hours       Physical Exam   Physical Exam   Constitutional: She is oriented to person, place, and time.   uncomfortable, wretching   HENT:   Head: Normocephalic.   Dry mm   Eyes: Pupils are equal, round, and reactive to light.   Injected conjunctivae   Neck: Normal range of motion.    Cardiovascular:   Tachycardic, regular   Pulmonary/Chest: Effort normal and breath sounds normal. No respiratory distress. She has no wheezes. She has no rales.   Abdominal: Soft. Bowel sounds are normal. There is tenderness (diffuse).   Musculoskeletal: Normal range of motion. She exhibits no edema.   Neurological: She is alert and oriented to person, place, and time.   Skin: Skin is warm and dry.   Psychiatric:   Sleepy, flat affect       Medications  Current Facility-Administered Medications   Medication Dose Route Frequency Provider Last Rate Last Dose   • morphine (pf) 4 MG/ML injection 2 mg  2 mg Intravenous Q2HRS PRN Trace Nathan M.D.   2 mg at 10/14/19 0301   • insulin regular human (HUMULIN/NOVOLIN R) 62.5 Units in  mL Infusion for DKA  6 Units/hr Intravenous Continuous Trace Nathan M.D. 24 mL/hr at 10/14/19 0623 6 Units/hr at 10/14/19 0623   • Respiratory Care per Protocol   Nebulization Continuous RT Jalen Boyle M.D.       • D10%-0.45% NaCl infusion   Intravenous Continuous Trace Nathan M.D. 100 mL/hr at 10/14/19 0259     • MD ALERT-PHARMACY TO CONSULT FOR DKA MONITORING 1 Each  1 Each Other PRN Trace Nathan M.D.       • sodium phosphate 30 mmol in 1/2  mL ivpb  30 mmol Intravenous Once PRN Trace Nathan M.D. 125 mL/hr at 10/14/19 0332 30 mmol at 10/14/19 0332   • Adult DKA potassium(K+) replacement scale  1 Each Intravenous Q4HRS Trace Nathan M.D.   1 Each at 10/14/19 0200   • lactated ringers infusion  1,000 mL Intravenous Continuous Trace Nathan M.D.   Stopped at 10/13/19 2359   • D5LR infusion   Intravenous Continuous Trace Nathan M.D.   Stopped at 10/14/19 0258   • enoxaparin (LOVENOX) inj 40 mg  40 mg Subcutaneous DAILY Trace Nathan M.D.   40 mg at 10/14/19 0503   • ondansetron (ZOFRAN) syringe/vial injection 4 mg  4 mg Intravenous Q4HRS PRN Trace Nathan M.D.           Fluids    Intake/Output Summary (Last 24 hours) at  10/14/2019 0646  Last data filed at 10/14/2019 0600  Gross per 24 hour   Intake 3683.84 ml   Output 1500 ml   Net 2183.84 ml       Laboratory          Recent Labs     10/13/19  1815 10/13/19  2138 10/14/19  0202   SODIUM 140 147* 147*   POTASSIUM 4.6 4.3 4.2   CHLORIDE 104 119* 124*   CO2 <5* <5* 8*   BUN 24* 23* 20   CREATININE 1.30 1.09 0.81   MAGNESIUM 2.2  --  1.8   PHOSPHORUS 7.2*  --  1.0*   CALCIUM 9.1 8.1* 8.7     Recent Labs     10/13/19  1815 10/13/19  2138 10/14/19  0202   ALTSGPT 10  --   --    ASTSGOT 12  --   --    ALKPHOSPHAT 143*  --   --    TBILIRUBIN 0.2  --   --    LIPASE  --   --  26   GLUCOSE 660* 383* 192*     Recent Labs     10/13/19  1815   WBC 24.9*   NEUTSPOLYS 76.30*   LYMPHOCYTES 16.30*   MONOCYTES 4.70   EOSINOPHILS 0.20   BASOPHILS 0.60   ASTSGOT 12   ALTSGPT 10   ALKPHOSPHAT 143*   TBILIRUBIN 0.2     Recent Labs     10/13/19  1815   RBC 5.57*   HEMOGLOBIN 13.4   HEMATOCRIT 47.2*   PLATELETCT 470*       Imaging  X-Ray:  My impression is: no infiltrate    Assessment/Plan  * Diabetic ketoacidosis with coma associated with type 1 diabetes mellitus (HCC)- (present on admission)  Assessment & Plan  Underlying cause: suspect viral infection give prodrome of n/v/d and sinus congestion    Trop-nomal  EKG-no iscehmia  Utox-opiates  UA-normal yesterday, repeat today d/t cloudy urine  CXR no infiltrate    Gap widening on DKA protocol so increased insulin gtt rate and dextrose rate. Still clinically hypovolemic so continuing to replete fluid        Hypophosphatemia  Assessment & Plan  Aggressive repletion    Leukocytosis- (present on admission)  Assessment & Plan  Likely stress response vs viral infection  Repeating UA because cloudy, no other evidence of bacterial infection and no clinical decompensation  Repeat in am    Gastroparesis due to DM (HCC)- (present on admission)  Assessment & Plan  wretching today  Home reglan, switch to IV because not tolerating PO.  QTc OK    Hypokalemia- (present on  admission)  Assessment & Plan  repleting via port so can replete aggressively    Diabetes type I (CMS-HCC)- (present on admission)  Assessment & Plan  Long-term type 1 diabetes  Multiple admits for DKA, very brittle patient  Reports excellent med compliance and switching injection sites  Mom states she is very compliant but hemoglobin A1c of 14 suggest that is not the case.    Diabetic education when clinically appropriate  Needs referral for outpatient endocrinologist and new glucometer on discharge  Se[facility]    Thrombocytosis (HCC)- (present on admission)  Assessment & Plan  Mild, felt secondary to hemoconcentration, serial CBC to monitor    Port-A-Cath in place- (present on admission)  Assessment & Plan  Patient with multiple admits for DKA and diabetic complication gastroparesis  Difficult IV access noted in the past  Status post right anterior chest port  No signs of infection, monitoring    Elevated alkaline phosphatase level- (present on admission)  Assessment & Plan  Mildly elevated alkaline phosphatase, unclear etiology, present in the past  Serial CMP  Further evaluation is clinically prudent       VTE:  Lovenox  Ulcer: Not Indicated  Lines: port    I have performed a physical exam and reviewed and updated ROS and Plan today (10/14/2019). In review of yesterday's note (10/13/2019), there are no changes except as documented above.     Discussed patient condition and risk of morbidity and/or mortality with RN, RT, Pharmacy, [facility] resident and Charge nurse / hot rounds  The patient remains critically ill with DKA on an insulin drip requiring active titration due to widening gap acidosis.  Critical care time = 47 minutes in directly providing and coordinating critical care and extensive data review.  No time overlap and excludes procedures.

## 2019-10-14 NOTE — ASSESSMENT & PLAN NOTE
Underlying cause: suspect viral infection give prodrome of n/v/d and sinus congestion    Trop-nomal  EKG-no iscehmia  Utox-opiates  UA-repeat UA contaminate  CXR no infiltrate    Doing well on home insulin. Slightly low BG this am (66) but she's so brittle will not make changes to long acting insulin

## 2019-10-14 NOTE — ASSESSMENT & PLAN NOTE
Glu 527 on presentation. Bicarb <5, BHB >8, AG 31, lactic acid 2.0. Patient has had frequent episodes DKA with many hospital admissions.   - Off DKA protocol   - NPO with ice chips: advanced to Diabetic Diet this am   - troponin, urine drug screen, urinalysis,influenza; unremarkable   - Insulin lantus 30 Units BID and High sliding Scale Insulin

## 2019-10-15 NOTE — PROGRESS NOTES
Critical Care Progress Note    Date of admission  10/13/2019    Chief Complaint  29 y.o. female admitted 10/13/2019 with DKA    Hospital Course    29 y.o. female history of type 1 diabetes and most recent admit was July of this year for DKA, brittle type I diabetes with almost 20 admits for DKA complicated by gastroparesis but otherwise negative past medical history who presented 10/13/2019 with kusmaul respirations and altered LOC.  Blood sugar was found to be elevated at 660 and bicarb level was less than 5.  Beta hydroxybutyric acid also came back at greater than 8.  She received normal saline fluid bolus in the ER and a second liter bolus of lactated Ringer's.  She was started fairly quickly on insulin infusion.  Initial lactic acid level was 2.  She initially was very poorly responsive and get no verbal and minimal other response until after fluids were initiated and insulin started.  She was then able to move all extremities to command although weakly and in a delayed fashion.  She was also able to open eyes and lift her head off the bed weakly.  Patient has a right anterior chest port that looks okay and was accessed by the ER staff.  Patient is had so many admissions and difficulty with gastroparesis and vomiting that she had this port placed in the past for easy venous access.     Patient's mother is at the bedside and gave a history of the patient coming home in the afternoon yesterday bit early from work not feeling well.  She did not describe any specific symptoms beyond malaise.  This a.m. she was okay and responding.  When she returned from work about 12 hours later she was unresponsive and having kussmaul respirations.  She did state that her daughter had multiple admits that she had brittle diabetes.  Gastroparesis has been a problem as well and she is tried CBD oil without much benefit.  She does not smoke marijuana or abuse any other substances.  She thought she was fairly compliant however her  hemoglobin A1c is 14.      Interval Problem Update  Neuro: AOx4  HR: 100-110s  SBP: 110s  Tmax: AF  GI: emesis, has been NPO  I/O: +4.7L  Lines: PIV, port  Resp: RA   Vte: lovenox  PPI/H2:protonix  Antibx: n/a      Flu neg  Mag, phos  Feeling better, thinks she can eat  Transition to home long-acting with high sliding scale      Review of Systems  Review of Systems   Constitutional: Positive for chills and malaise/fatigue.   HENT: Positive for congestion and sore throat.    Respiratory: Positive for cough. Negative for shortness of breath.    Cardiovascular: Negative for palpitations and leg swelling.   Gastrointestinal: Positive for abdominal pain, diarrhea, nausea and vomiting.   Genitourinary: Negative for dysuria.   All other systems reviewed and are negative.       Vital Signs for last 24 hours   Temp:  [37.3 °C (99.1 °F)] 37.3 °C (99.1 °F)  Pulse:  [105-135] 110  Resp:  [15-27] 23  BP: ()/() 143/102  SpO2:  [97 %-99 %] 99 %    Hemodynamic parameters for last 24 hours       Respiratory Information for the last 24 hours       Physical Exam   Physical Exam   Constitutional: She is oriented to person, place, and time. She appears well-developed. No distress.   HENT:   Head: Normocephalic.   Mouth/Throat: Oropharynx is clear and moist.   Eyes: Pupils are equal, round, and reactive to light.   Injected conjunctivae   Neck: Normal range of motion.   Cardiovascular: Normal rate and regular rhythm.   Pulmonary/Chest: Effort normal and breath sounds normal. No respiratory distress. She has no wheezes. She has no rales.   Abdominal: Soft. Bowel sounds are normal. There is no tenderness (diffuse).   Musculoskeletal: Normal range of motion. She exhibits no edema.   Neurological: She is alert and oriented to person, place, and time.   Skin: Skin is warm and dry.   Psychiatric: She has a normal mood and affect.       Medications  Current Facility-Administered Medications   Medication Dose Route Frequency Provider  Last Rate Last Dose   • potassium phosphates 30 mmol in  mL ivpb  30 mmol Intravenous Once Lashonda Tiwari M.D.       • Magnesium Sulfate in D5W IVPB premix 1 g  1 g Intravenous Once Lashonda Tiwari M.D.       • pantoprazole (PROTONIX) injection 20 mg  20 mg Intravenous DAILY Mela Rausch M.D.   20 mg at 10/15/19 0500   • metoclopramide (REGLAN) injection 10 mg  10 mg Intravenous Q8HRS Mela Rausch M.D.   10 mg at 10/15/19 0459   • D10%-0.45% NaCl infusion   Intravenous Continuous Mela Rausch M.D. 150 mL/hr at 10/15/19 0927     • insulin regular human (HUMULIN/NOVOLIN R) 62.5 Units in  mL infusion per protocol  0-29 Units/hr Intravenous Continuous Mela Rausch M.D. 22 mL/hr at 10/15/19 0922 5.5 Units/hr at 10/15/19 0922   • DEXTROSE 10% BOLUS 125-250 mL  125-250 mL Intravenous PRN Mela Rausch M.D.       • K+ Scale: Goal of 4.5  1 Each Intravenous Q6HRS Mela Rausch M.D.   1 Each at 10/15/19 0400   • Respiratory Care per Protocol   Nebulization Continuous RT Jalen Boyle M.D.       • enoxaparin (LOVENOX) inj 40 mg  40 mg Subcutaneous DAILY Trace Nathan M.D.   40 mg at 10/15/19 0500   • ondansetron (ZOFRAN) syringe/vial injection 4 mg  4 mg Intravenous Q4HRS PRN Trace Nathan M.D.   4 mg at 10/14/19 1824       Fluids    Intake/Output Summary (Last 24 hours) at 10/15/2019 1007  Last data filed at 10/15/2019 0800  Gross per 24 hour   Intake 5114.21 ml   Output 1735 ml   Net 3379.21 ml       Laboratory          Recent Labs     10/14/19  0202  10/14/19  1134 10/14/19  1549 10/14/19  2203 10/15/19  0332 10/15/19  0825   SODIUM 147*   < > 148* 149* 140 148*  --    POTASSIUM 4.2   < > 3.6 3.0* 2.7* 3.4* 3.9   CHLORIDE 124*   < > 126* 125* 120* 124*  --    CO2 8*   < > 15* 16* 13* 15*  --    BUN 20   < > 17 14 10 10  --    CREATININE 0.81   < > 0.72 0.64 0.60 0.57  --    MAGNESIUM 1.8  --  2.0  --   --   --  1.9   PHOSPHORUS 1.0*  --  2.6  --  1.2*  --  <1.0*   CALCIUM 8.7   < >  7.7* 7.5* 6.9* 7.8*  --     < > = values in this interval not displayed.     Recent Labs     10/13/19  1815  10/14/19  0202  10/14/19  1549 10/14/19  2203 10/15/19  0332   ALTSGPT 10  --   --   --   --   --   --    ASTSGOT 12  --   --   --   --   --   --    ALKPHOSPHAT 143*  --   --   --   --   --   --    TBILIRUBIN 0.2  --   --   --   --   --   --    LIPASE  --   --  26  --   --   --   --    GLUCOSE 660*   < > 192*   < > 200* 1136* 226*    < > = values in this interval not displayed.     Recent Labs     10/13/19  1815 10/15/19  0825   WBC 24.9* 8.4   NEUTSPOLYS 76.30* 60.40   LYMPHOCYTES 16.30* 25.70   MONOCYTES 4.70 12.30   EOSINOPHILS 0.20 0.70   BASOPHILS 0.60 0.50   ASTSGOT 12  --    ALTSGPT 10  --    ALKPHOSPHAT 143*  --    TBILIRUBIN 0.2  --      Recent Labs     10/13/19  1815 10/15/19  0825   RBC 5.57* 4.38   HEMOGLOBIN 13.4 10.6*   HEMATOCRIT 47.2* 34.1*   PLATELETCT 470* 231       Imaging  X-Ray:  My impression is: no infiltrate    Assessment/Plan  * Diabetic ketoacidosis with coma associated with type 1 diabetes mellitus (HCC)- (present on admission)  Assessment & Plan  Underlying cause: suspect viral infection give prodrome of n/v/d and sinus congestion    Trop-nomal  EKG-no iscehmia  Utox-opiates  UA-repeat UA contaminate  CXR no infiltrate    Improved condition.  Transition to home regimen of insulin today and trial PO        Hypophosphatemia  Assessment & Plan  Aggressive repletion  milk    Leukocytosis- (present on admission)  Assessment & Plan  resolved    Gastroparesis due to DM (HCC)- (present on admission)  Assessment & Plan  Home reglan PO    Hypokalemia- (present on admission)  Assessment & Plan  repleting via port so can replete aggressively    Diabetes type I (CMS-HCC)- (present on admission)  Assessment & Plan  Long-term type 1 diabetes  Multiple admits for DKA, very brittle patient  Reports excellent med compliance and switching injection sites  Mom states she is very compliant but  hemoglobin A1c of 14 suggest that is not the case.    Diabetic education when clinically appropriate  Needs referral for outpatient endocrinologist and new glucometer on discharge  Seen at [facility]    Thrombocytosis (HCC)- (present on admission)  Assessment & Plan  Mild, felt secondary to hemoconcentration, serial CBC to monitor    Port-A-Cath in place- (present on admission)  Assessment & Plan  Patient with multiple admits for DKA and diabetic complication gastroparesis  Difficult IV access noted in the past  Status post right anterior chest port  No signs of infection, monitoring    Elevated alkaline phosphatase level- (present on admission)  Assessment & Plan  Mildly elevated alkaline phosphatase, unclear etiology, present in the past  Serial CMP  Further evaluation is clinically prudent       VTE:  Lovenox  Ulcer: Not Indicated  Lines: port    I have performed a physical exam and reviewed and updated ROS and Plan today (10/15/2019). In review of yesterday's note (10/14/2019), there are no changes except as documented above.     Discussed patient condition and risk of morbidity and/or mortality with RN, RT, Pharmacy, [facility] resident and Charge nurse / hot rounds  The patient remains critically ill with DKA on an insulin drips.  Critical care time = 36 minutes in directly providing and coordinating critical care and extensive data review.  No time overlap and excludes procedures.

## 2019-10-15 NOTE — CARE PLAN
Problem: Skin Integrity  Goal: Risk for impaired skin integrity will decrease  Intervention: Implement precautions to protect skin integrity in collaboration with the interdisciplinary team  Flowsheets  Taken 10/15/2019 0514 by Navi Shah R.N.  Bed Types: Low Air Loss Mattress  Taken 10/14/2019 2000 by Navi Shah R.N.  Friction Interventions: Draw Sheet / Pad Used for Repositioning  Patient is Receiving Nutrition: Nothing by Mouth  Activity : Bed  Taken 10/15/2019 0600 by Navi Shah R.N.  Patient Turns / Repositioning: Patient Turns Self from Side to Side  Taken 10/14/2019 0800 by Kenia Yoon R.N.  Moisturizers: Moisturizer   Vitamin Therapy in Use: Yes     Problem: Respiratory:  Goal: Respiratory status will improve  Intervention: Assess and monitor pulmonary status  Flowsheets  Taken 10/14/2019 2000 by Navi Shah R.N.  Resp: 24 !  SpO2: 99 %  Taken 10/14/2019 1600 by Kenia Yoon R.N.  O2 (LPM): 0  Taken 10/15/2019 0400 by Navi Shah R.N.  Work Of Breathing / Effort: Tachypnea  RUL Breath Sounds: Clear;Diminished  RML Breath Sounds: Diminished  RLL Breath Sounds: Diminished  NANCY Breath Sounds: Clear;Diminished  LLL Breath Sounds: Diminished  Taken 10/13/2019 2105 by Kathy Zhu, RRT  Cough: Non Productive

## 2019-10-15 NOTE — DISCHARGE PLANNING
Patient is eligible for Medicaid Meds to Beds at discharge if they have coverage with [place] Medicaid, Medicaid FFS, Medicaid HMO ([facility]), or [place]. This service is provided through the [facility] if orders are received by the pharmacy prior to 4pm Monday through Friday. Preferred pharmacy has been changed to [facility]. Please call [phone] prior to discharge.

## 2019-10-15 NOTE — PROGRESS NOTES
[facility] Progress Note      Admit Date: 10/13/2019    Resident(s): Lashonda Tiwari  Attending: ETHEL BRANNON/ Dr. Rausch    Date & Time:   10/15/2019   10:18 AM       Patient ID:    Name:             Alis Sparks   YOB: 1990  Age:                 29 y.o.  female   MRN:               [id number]    HPI:  The patient is a 29 year old female with PMHx of Gastroparesis,Type 1 DM, Multiple admissions in the past for DKA presented to the ER found by her mother being altered.  In the ER patient was found to have high Blood Sugars with High anion gap metabolic acidosis.  Patient is admitted to the ICU for DKA and close monitoring     Consultants:     PMA:     Interval Events:  - Overnight Patient remains Afebrile, SR--130's, -110's  - Discontinue Insulin Drip and transition to Insulin lantus 30 units BID, High sliding scale insulin  - Diet if tolerated  - replete Phosphorus and Magnesium   - continue Dextrose if PO cannot be tolerated  - Change Reglan IV to PO    Review of Systems   Constitutional: Negative for chills and fever.   HENT: Negative for congestion.    Eyes: Negative for blurred vision.   Respiratory: Negative for cough and shortness of breath.    Cardiovascular: Negative for chest pain, palpitations and leg swelling.   Gastrointestinal: Positive for nausea and vomiting. Negative for abdominal pain and heartburn.   Genitourinary: Negative for flank pain.   Musculoskeletal: Negative for myalgias.   Neurological: Negative for dizziness, speech change, focal weakness and headaches.   Endo/Heme/Allergies: Negative for environmental allergies.   Psychiatric/Behavioral: The patient is not nervous/anxious.        PHYSICAL EXAM  Vitals:    10/15/19 0500 10/15/19 0600 10/15/19 0700 10/15/19 0800   BP: 130/96 129/83 123/87 143/102   Pulse: (!) 116 (!) 108 (!) 105 (!) 110   Resp: 18 17 18 (!) 23   Temp:       TempSrc:   Bladder Bladder   SpO2: 99% 99% 99% 99%   Weight:       Height:  "        Body mass index is 24.77 kg/m².  /102   Pulse (!) 110   Temp 37.3 °C (99.1 °F)   Resp (!) 23   Ht 1.676 m (5' 6\")   Wt 69.6 kg (153 lb 7 oz)   SpO2 99%   BMI 24.77 kg/m²   O2 therapy: Pulse Oximetry: 99 %, O2 (LPM): 0, O2 Delivery: None (Room Air)    Physical Exam   Constitutional: She is oriented to person, place, and time. No distress.   HENT:   Head: Normocephalic and atraumatic.   Dry mucus membranes    Eyes: Pupils are equal, round, and reactive to light. Conjunctivae and EOM are normal.   Neck: Normal range of motion. Neck supple. No JVD present.   Cardiovascular: Regular rhythm.   No murmur heard.  Sinus tachycardia    Pulmonary/Chest: Effort normal and breath sounds normal. No respiratory distress.   Abdominal: Soft. Bowel sounds are normal. She exhibits no distension. There is tenderness.   Musculoskeletal: Normal range of motion. She exhibits no edema.   Lymphadenopathy:     She has no cervical adenopathy.   Neurological: She is alert and oriented to person, place, and time. No cranial nerve deficit.   Skin: Skin is warm and dry. She is not diaphoretic.   Psychiatric: Affect normal.   Nursing note and vitals reviewed.      Respiratory:     Respiration: (!) 23, Pulse Oximetry: 99 %    Chest Tube Drains:          HemoDynamics:  Pulse: (!) 110 Blood Pressure: 143/102      Neuro:   Alert and oriented x 4  No focal neurological deficits noted     Fluids:  Date 10/15/19 0700 - 10/16/19 0659   Shift 8385-5327 3885-1032 4333-0514 24 Hour Total   INTAKE   I.V. 335.7   335.7     Insulin Volume 35.7   35.7     Volume (mL) (D10%-0.45% NaCl infusion) 300   300   Shift Total 335.7   335.7   OUTPUT   Urine 200   200     Output (mL) (Urethral Catheter) 200   200   Emesis 250   250     Emesis 250   250     Emesis - Number of Times 1 x   1 x   Stool         Number of Times Stooled 0 x   0 x   Shift Total 450   450   NET -114.3   -114.3        Intake/Output Summary (Last 24 hours) at 10/14/2019 " 0948  Last data filed at 10/14/2019 0600  Gross per 24 hour   Intake 3683.84 ml   Output 1500 ml   Net 2183.84 ml          Body mass index is 24.77 kg/m².    Recent Labs     10/14/19  0202  10/14/19  1134 10/14/19  1549 10/14/19  2203 10/15/19  0332 10/15/19  0825   SODIUM 147*   < > 148* 149* 140 148*  --    POTASSIUM 4.2   < > 3.6 3.0* 2.7* 3.4* 3.9   CHLORIDE 124*   < > 126* 125* 120* 124*  --    CO2 8*   < > 15* 16* 13* 15*  --    BUN 20   < > 17 14 10 10  --    CREATININE 0.81   < > 0.72 0.64 0.60 0.57  --    MAGNESIUM 1.8  --  2.0  --   --   --  1.9   PHOSPHORUS 1.0*  --  2.6  --  1.2*  --  <1.0*   CALCIUM 8.7   < > 7.7* 7.5* 6.9* 7.8*  --     < > = values in this interval not displayed.       GI/Nutrition:  Recent Labs     10/13/19  1815  10/14/19  0202  10/14/19  1549 10/14/19  2203 10/15/19  0332   ALTSGPT 10  --   --   --   --   --   --    ASTSGOT 12  --   --   --   --   --   --    ALKPHOSPHAT 143*  --   --   --   --   --   --    TBILIRUBIN 0.2  --   --   --   --   --   --    LIPASE  --   --  26  --   --   --   --    GLUCOSE 660*   < > 192*   < > 200* 1136* 226*    < > = values in this interval not displayed.       Heme:  Recent Labs     10/13/19  1815 10/15/19  0825   RBC 5.57* 4.38   HEMOGLOBIN 13.4 10.6*   HEMATOCRIT 47.2* 34.1*   PLATELETCT 470* 231       Infectious Disease:  Monitored Temp 2  Av.6 °C (99.6 °F)  Min: 37.2 °C (99 °F)  Max: 37.9 °C (100.2 °F)  Temp  Av.3 °C (99.1 °F)  Min: 37.3 °C (99.1 °F)  Max: 37.3 °C (99.1 °F)  Recent Labs     10/13/19  1815 10/15/19  0825   WBC 24.9* 8.4   NEUTSPOLYS 76.30* 60.40   LYMPHOCYTES 16.30* 25.70   MONOCYTES 4.70 12.30   EOSINOPHILS 0.20 0.70   BASOPHILS 0.60 0.50   ASTSGOT 12  --    ALTSGPT 10  --    ALKPHOSPHAT 143*  --    TBILIRUBIN 0.2  --        Meds:  • potassium phosphate ivpb  30 mmol     • magnesium sulfate  1 g     • insulin glargine  30 Units     • insulin regular  3-14 Units      And   • glucose  16 g      And   • dextrose 10% bolus   250 mL     • phosphorus  1 Tab     • metoclopramide  10 mg     • pantoprazole  20 mg     • D10%-0.45% NaCl   150 mL/hr at 10/15/19 0927   • insulin infusion for 150 protocol  0-29 Units/hr 5.5 Units/hr (10/15/19 0922)   • dextrose 10% bolus  125-250 mL     • K+ Scale: Goal of 4.5  1 Each     • Respiratory Care per Protocol       • enoxaparin  40 mg     • ondansetron  4 mg          Imaging:  DX-CHEST-PORTABLE (1 VIEW)   Final Result      No acute cardiopulmonary process is seen.      Linear metallic density projects over the right lung base. Clinical correlation recommended.             Assessment and Plan:      * Diabetic ketoacidosis with coma associated with type 1 diabetes mellitus (HCC)- (present on admission)  Assessment & Plan  Glu 527 on presentation. Bicarb <5, BHB >8, AG 31, lactic acid 2.0. Patient has had frequent episodes DKA with many hospital admissions.   - Off DKA protocol   - NPO with ice chips: advanced to Diabetic Diet this am   - troponin, urine drug screen, urinalysis,influenza; unremarkable   - Insulin lantus 30 Units BID and High sliding Scale Insulin         Hypophosphatemia  Assessment & Plan  - low phosphate this am  - replete as needed  - CTM  - 2/2 DKA     Leukocytosis- (present on admission)  Assessment & Plan  -Resolved   -WBC 24.9. No focal signs of infection, leukocytosis possibly attributable to DKA  - Continue to monitor, follow blood cultures.    Gastroparesis due to DM (HCC)- (present on admission)  Assessment & Plan  - resume reglan home medication  - QTC is normal     Hypokalemia- (present on admission)  Assessment & Plan  - Decreased Potassium on BMP this am 3.1  - Replete as needed  - 2/2 DKA     Diabetes type I (CMS-HCC)- (present on admission)  Assessment & Plan  See diabetic ketoacidosis    Thrombocytosis (HCC)- (present on admission)  Assessment & Plan  -Resolved   - elevated platelet count 470  - likely secondary to dehydration from DKA  - CTM    Elevated alkaline  phosphatase level- (present on admission)  Assessment & Plan  - elevated ALP   - Normal AST/ALT  - May be vitamin D deficiency       DISPO: ICU for DKA     CODE STATUS: FULL     Quality Measures:  Pitt Catheter: Yes   DVT Prophylaxis: Lovenox  Ulcer Prophylaxis: Omeprazole   Antibiotics: n/a  Lines: PIV  Port A cath in place     This note was created using voice recognition software. There may be unintended errors in spelling, grammar or content.

## 2019-10-16 NOTE — PROGRESS NOTES
[facility] Progress Note      Admit Date: 10/13/2019    Resident(s): Lashonda Tiwari  Attending: ETHEL BRANNON/ Dr. Rausch    Date & Time:   10/16/2019   2:43 PM       Patient ID:    Name:             Alis Sparks   YOB: 1990  Age:                 29 y.o.  female   MRN:               [id number]    HPI:  The patient is a 29 year old female with PMHx of Gastroparesis,Type 1 DM, Multiple admissions in the past for DKA presented to the ER found by her mother being altered.  In the ER patient was found to have high Blood Sugars with High anion gap metabolic acidosis.  Patient is admitted to the ICU for DKA and close monitoring     Consultants:  PMA:     Interval Events:  - Overnight Patient remains Afebrile, SR--130's, -110's  - Insulin Lantuss 30 units BID and Insulin sliding scale TID- FBS 60's repeat is 80's no changes at this time as the patient is on 30 units Lantuss outpatient. CTM daily accu checks and BMP  - replete K/phos  - Gap is closed, Discontinued D10 1/2 NS yesterday  - Patient tolerating PO intake without nausea, vomiting and abdominal pain  - Transfer to the floor is pending    Review of Systems   Constitutional: Negative for chills and fever.   HENT: Negative for congestion.    Eyes: Negative for blurred vision.   Respiratory: Negative for cough and shortness of breath.    Cardiovascular: Negative for chest pain, palpitations and leg swelling.   Gastrointestinal: Negative for abdominal pain, heartburn, nausea and vomiting.   Genitourinary: Negative for flank pain.   Musculoskeletal: Negative for myalgias.   Skin: Negative for rash.   Neurological: Negative for dizziness, speech change, focal weakness and headaches.   Endo/Heme/Allergies: Negative for environmental allergies.   Psychiatric/Behavioral: The patient is not nervous/anxious.        PHYSICAL EXAM  Vitals:    10/16/19 0700 10/16/19 0800 10/16/19 0900 10/16/19 1000   BP: 134/87 118/79 105/66 123/82  "  Pulse: 98 (!) 107 (!) 101 (!) 109   Resp: 17 (!) 31 (!) 21 12   Temp:  37 °C (98.6 °F)     TempSrc: Bladder Temporal     SpO2: 99% 94% 99% 99%   Weight:       Height:         Body mass index is 24.87 kg/m².  /82   Pulse (!) 109   Temp 37 °C (98.6 °F) (Temporal)   Resp 12   Ht 1.676 m (5' 6\")   Wt 69.9 kg (154 lb 1.6 oz)   SpO2 99%   BMI 24.87 kg/m²   O2 therapy: Pulse Oximetry: 99 %, O2 (LPM): 0, O2 Delivery: None (Room Air)    Physical Exam   Constitutional: She is oriented to person, place, and time. No distress.   HENT:   Head: Normocephalic and atraumatic.   Dry mucus membranes    Eyes: Pupils are equal, round, and reactive to light. Conjunctivae and EOM are normal.   Neck: Normal range of motion. Neck supple. No JVD present.   Cardiovascular: Regular rhythm.   No murmur heard.  Sinus tachycardia    Pulmonary/Chest: Effort normal and breath sounds normal. No respiratory distress.   Abdominal: Soft. Bowel sounds are normal. She exhibits no distension. There is no tenderness.   Musculoskeletal: Normal range of motion. She exhibits no edema.   Lymphadenopathy:     She has no cervical adenopathy.   Neurological: She is alert and oriented to person, place, and time. No cranial nerve deficit.   Skin: Skin is warm and dry. She is not diaphoretic.   Psychiatric: Affect normal.   Nursing note and vitals reviewed.      Respiratory:     Respiration: 12, Pulse Oximetry: 99 %    Chest Tube Drains:          HemoDynamics:  Pulse: (!) 109 Blood Pressure: 123/82      Neuro:   Alert and oriented x 4  No focal neurological deficits noted     Fluids:  Date 10/16/19 0700 - 10/17/19 0659   Shift 5368-9646 9578-3053 9852-4194 24 Hour Total   INTAKE   P.O. 250   250     P.O. 250   250   IV Piggyback 50.8   50.8     Volume (mL) (potassium chloride (KCL) ivpb 10 mEq) 0   0     Volume (mL) (potassium chloride in water (KCL) ivpb **Administer in ICU only** 20 mEq) 50.8   50.8   Shift Total 300.8   300.8   OUTPUT   Shift " Total       .8   300.8        Intake/Output Summary (Last 24 hours) at 10/14/2019 0948  Last data filed at 10/14/2019 0600  Gross per 24 hour   Intake 3683.84 ml   Output 1500 ml   Net 2183.84 ml       Weight: 69.9 kg (154 lb 1.6 oz)  Body mass index is 24.87 kg/m².    Recent Labs     10/14/19  2203 10/15/19  0332 10/15/19  0825 10/15/19  1631 10/15/19  2200 10/16/19  0415   SODIUM 140 148*  --   --   --  147*   POTASSIUM 2.7* 3.4* 3.9 3.7 3.8 3.3*   CHLORIDE 120* 124*  --   --   --  120*   CO2 13* 15*  --   --   --  20   BUN 10 10  --   --   --  7*   CREATININE 0.60 0.57  --   --   --  0.46*   MAGNESIUM  --   --  1.9 2.0  --  2.0   PHOSPHORUS 1.2*  --  <1.0*  --   --  2.1*   CALCIUM 6.9* 7.8*  --   --   --  7.6*       GI/Nutrition:  Recent Labs     10/13/19  1815  10/14/19  0202  10/14/19  2203 10/15/19  0332 10/16/19  0415   ALTSGPT 10  --   --   --   --   --   --    ASTSGOT 12  --   --   --   --   --   --    ALKPHOSPHAT 143*  --   --   --   --   --   --    TBILIRUBIN 0.2  --   --   --   --   --   --    LIPASE  --   --  26  --   --   --   --    GLUCOSE 660*   < > 192*   < > 1136* 226* 66    < > = values in this interval not displayed.       Heme:  Recent Labs     10/13/19  1815 10/15/19  0825 10/16/19  0415   RBC 5.57* 4.38 4.02*   HEMOGLOBIN 13.4 10.6* 10.0*   HEMATOCRIT 47.2* 34.1* 31.1*   PLATELETCT 470* 231 178       Infectious Disease:  Monitored Temp 2  Av.4 °C (99.3 °F)  Min: 36.9 °C (98.4 °F)  Max: 37.8 °C (100 °F)  Temp  Av °C (98.6 °F)  Min: 37 °C (98.6 °F)  Max: 37 °C (98.6 °F)  Recent Labs     10/13/19  1815 10/15/19  0825 10/16/19  0415   WBC 24.9* 8.4 6.4   NEUTSPOLYS 76.30* 60.40 52.90   LYMPHOCYTES 16.30* 25.70 33.10   MONOCYTES 4.70 12.30 10.20   EOSINOPHILS 0.20 0.70 3.00   BASOPHILS 0.60 0.50 0.50   ASTSGOT 12  --   --    ALTSGPT 10  --   --    ALKPHOSPHAT 143*  --   --    TBILIRUBIN 0.2  --   --        Meds:  • potassium chloride SA  40 mEq     • sennosides  8.6 mg     •  phosphorus  2 Tab     • insulin glargine  30 Units     • insulin regular  3-14 Units      And   • glucose  16 g      And   • dextrose 10% bolus  250 mL     • metoclopramide  10 mg     • omeprazole  20 mg     • Respiratory Care per Protocol       • enoxaparin  40 mg     • ondansetron  4 mg          Imaging:  DX-CHEST-PORTABLE (1 VIEW)   Final Result      No acute cardiopulmonary process is seen.      Linear metallic density projects over the right lung base. Clinical correlation recommended.             Assessment and Plan:      * Diabetic ketoacidosis with coma associated with type 1 diabetes mellitus (HCC)- (present on admission)  Assessment & Plan  Glu 527 on presentation. Bicarb <5, BHB >8, AG 31, lactic acid 2.0. Patient has had frequent episodes DKA with many hospital admissions.   - Off DKA protocol   - NPO with ice chips: advanced to Diabetic Diet this am patient tolerating PO intake   - troponin, urine drug screen, urinalysis,influenza; unremarkable   - Insulin lantus 30 Units BID and High sliding Scale Insulin : no changes to the regimen at this time        Hypophosphatemia  Assessment & Plan  - low phosphate this am  - replete as needed  - CTM  - 2/2 DKA     Leukocytosis- (present on admission)  Assessment & Plan  -Resolved   -WBC 24.9. No focal signs of infection, leukocytosis possibly attributable to DKA  - Continue to monitor, follow blood cultures.    Gastroparesis due to DM (HCC)- (present on admission)  Assessment & Plan  - resume reglan home medication  - QTC is normal     Hypokalemia- (present on admission)  Assessment & Plan  - Decreased Potassium on BMP this am 3.1  - Replete as needed  - 2/2 DKA     Diabetes type I (CMS-HCC)- (present on admission)  Assessment & Plan  See diabetic ketoacidosis    Thrombocytosis (HCC)- (present on admission)  Assessment & Plan  -Resolved   - elevated platelet count 470  - likely secondary to dehydration from DKA  - CTM    Elevated alkaline phosphatase level-  (present on admission)  Assessment & Plan  - elevated ALP   - Normal AST/ALT  - May be vitamin D deficiency       DISPO: ICU for DKA     CODE STATUS: FULL     Quality Measures:  Pitt Catheter: Yes   DVT Prophylaxis: Lovenox  Ulcer Prophylaxis: Omeprazole   Antibiotics: n/a  Lines: PIV  Port A cath in place     This note was created using voice recognition software. There may be unintended errors in spelling, grammar or content.

## 2019-10-16 NOTE — CARE PLAN
Problem: Communication  Goal: The ability to communicate needs accurately and effectively will improve  Outcome: MET     Problem: Infection  Goal: Will remain free from infection  Outcome: MET     Problem: Venous Thromboembolism (VTW)/Deep Vein Thrombosis (DVT) Prevention:  Goal: Patient will participate in Venous Thrombosis (VTE)/Deep Vein Thrombosis (DVT)Prevention Measures  Outcome: MET     Problem: Bowel/Gastric:  Goal: Normal bowel function is maintained or improved  Outcome: PROGRESSING SLOWER THAN EXPECTED  Goal: Will not experience complications related to bowel motility  Outcome: MET     Problem: Discharge Barriers/Planning  Goal: Patient's continuum of care needs will be met  Outcome: MET     Problem: Fluid Volume:  Goal: Will maintain balanced intake and output  Outcome: MET     Problem: Respiratory:  Goal: Respiratory status will improve  Outcome: MET     Problem: Psychosocial Needs:  Goal: Level of anxiety will decrease  Outcome: MET     Problem: Mobility  Goal: Risk for activity intolerance will decrease  Outcome: MET   Patient alert and oriented.  Off Insulin drip and on SQ insulin. Patient continues to vomit at times but was able to tolerate clears.  Patient on room air.  Pitt in place.  Patient had no complaints of pain at this time.

## 2019-10-16 NOTE — PROGRESS NOTES
Critical Care Progress Note    Date of admission  10/13/2019    Chief Complaint  29 y.o. female admitted 10/13/2019 with DKA    Hospital Course    29 y.o. female history of type 1 diabetes and most recent admit was July of this year for DKA, brittle type I diabetes with almost 20 admits for DKA complicated by gastroparesis but otherwise negative past medical history who presented 10/13/2019 with kusmaul respirations and altered LOC.  Blood sugar was found to be elevated at 660 and bicarb level was less than 5.  Beta hydroxybutyric acid also came back at greater than 8.  She received normal saline fluid bolus in the ER and a second liter bolus of lactated Ringer's.  She was started fairly quickly on insulin infusion.  Initial lactic acid level was 2.  She initially was very poorly responsive and get no verbal and minimal other response until after fluids were initiated and insulin started.  She was then able to move all extremities to command although weakly and in a delayed fashion.  She was also able to open eyes and lift her head off the bed weakly.  Patient has a right anterior chest port that looks okay and was accessed by the ER staff.  Patient is had so many admissions and difficulty with gastroparesis and vomiting that she had this port placed in the past for easy venous access.     Patient's mother is at the bedside and gave a history of the patient coming home in the afternoon yesterday bit early from work not feeling well.  She did not describe any specific symptoms beyond malaise.  This a.m. she was okay and responding.  When she returned from work about 12 hours later she was unresponsive and having kussmaul respirations.  She did state that her daughter had multiple admits that she had brittle diabetes.  Gastroparesis has been a problem as well and she is tried CBD oil without much benefit.  She does not smoke marijuana or abuse any other substances.  She thought she was fairly compliant however her  hemoglobin A1c is 14.      Interval Problem Update  Neuro: AOx4  HR: 100s  SBP: normotensive  Tmax: AF  GI: BM PTA, tolerating a diet  I/O: voiding  Lines: PIV, port  Resp: RA   Vte: lovenox  PPI/H2:home prilosec  Antibx: no abx    Daily k, s/c k scale    Review of Systems  Review of Systems   Constitutional: Negative for chills and malaise/fatigue.   HENT: Positive for congestion. Negative for sore throat.    Respiratory: Negative for cough and shortness of breath.    Cardiovascular: Negative for palpitations and leg swelling.   Gastrointestinal: Positive for abdominal pain, constipation and nausea. Negative for diarrhea and vomiting.   Genitourinary: Negative for dysuria.   All other systems reviewed and are negative.       Vital Signs for last 24 hours   Pulse:  [] 107  Resp:  [15-31] 31  BP: ()/(52-99) 118/79  SpO2:  [94 %-100 %] 94 %    Hemodynamic parameters for last 24 hours       Respiratory Information for the last 24 hours       Physical Exam   Physical Exam   Constitutional: She is oriented to person, place, and time. She appears well-developed. No distress.   HENT:   Head: Normocephalic.   Mouth/Throat: Oropharynx is clear and moist.   Eyes: Pupils are equal, round, and reactive to light.   Injected conjunctivae   Neck: Normal range of motion.   Cardiovascular: Normal rate and regular rhythm.   Pulmonary/Chest: Effort normal and breath sounds normal. No respiratory distress. She has no wheezes. She has no rales.   Abdominal: Soft. Bowel sounds are normal. There is no tenderness.   Musculoskeletal: Normal range of motion. She exhibits no edema.   Neurological: She is alert and oriented to person, place, and time.   Skin: Skin is warm and dry.   Psychiatric: She has a normal mood and affect.       Medications  Current Facility-Administered Medications   Medication Dose Route Frequency Provider Last Rate Last Dose   • insulin glargine (LANTUS) injection 30 Units  30 Units Subcutaneous BID  INSULIN Mela Rausch M.D.   30 Units at 10/16/19 0901   • insulin regular (HUMULIN R) injection 3-14 Units  3-14 Units Subcutaneous 4X/DAY ACHS Mela Rausch M.D.   Stopped at 10/15/19 2100    And   • glucose 4 g chewable tablet 16 g  16 g Oral Q15 MIN PRN Mela Rausch M.D.        And   • DEXTROSE 10% BOLUS 250 mL  250 mL Intravenous Q15 MIN PRN Mela Rausch M.D.       • phosphorus (K-PHOS-NEUTRAL, PHOSPHA 250 NEUTRAL) per tablet 1 Tab  1 Tab Oral TID Mela Rausch M.D.   1 Tab at 10/16/19 0558   • metoclopramide (REGLAN) tablet 10 mg  10 mg Oral TID AC Mela Rausch M.D.   10 mg at 10/16/19 0559   • omeprazole (PRILOSEC) capsule 20 mg  20 mg Oral DAILY Mela Rausch M.D.   20 mg at 10/16/19 0559   • D10%-0.45% NaCl infusion   Intravenous Continuous Mela Rausch M.D.   Stopped at 10/15/19 1357   • K+ Scale: Goal of 4.5  1 Each Intravenous Q6HRS Mela Rausch M.D.   1 Each at 10/16/19 0400   • Respiratory Care per Protocol   Nebulization Continuous RT Jalen Boyle M.D.       • enoxaparin (LOVENOX) inj 40 mg  40 mg Subcutaneous DAILY Trace Nathan M.D.   40 mg at 10/16/19 0559   • ondansetron (ZOFRAN) syringe/vial injection 4 mg  4 mg Intravenous Q4HRS PRN Trace Nathan M.D.   4 mg at 10/14/19 1824       Fluids    Intake/Output Summary (Last 24 hours) at 10/16/2019 1013  Last data filed at 10/16/2019 0700  Gross per 24 hour   Intake 2764.3 ml   Output 1600 ml   Net 1164.3 ml       Laboratory          Recent Labs     10/14/19  2203 10/15/19  0332 10/15/19  0825 10/15/19  1631 10/15/19  2200 10/16/19  0415   SODIUM 140 148*  --   --   --  147*   POTASSIUM 2.7* 3.4* 3.9 3.7 3.8 3.3*   CHLORIDE 120* 124*  --   --   --  120*   CO2 13* 15*  --   --   --  20   BUN 10 10  --   --   --  7*   CREATININE 0.60 0.57  --   --   --  0.46*   MAGNESIUM  --   --  1.9 2.0  --  2.0   PHOSPHORUS 1.2*  --  <1.0*  --   --  2.1*   CALCIUM 6.9* 7.8*  --   --   --  7.6*     Recent Labs     10/13/19  6099   10/14/19  0202  10/14/19  2203 10/15/19  0332 10/16/19  0415   ALTSGPT 10  --   --   --   --   --   --    ASTSGOT 12  --   --   --   --   --   --    ALKPHOSPHAT 143*  --   --   --   --   --   --    TBILIRUBIN 0.2  --   --   --   --   --   --    LIPASE  --   --  26  --   --   --   --    GLUCOSE 660*   < > 192*   < > 1136* 226* 66    < > = values in this interval not displayed.     Recent Labs     10/13/19  1815 10/15/19  0825 10/16/19  0415   WBC 24.9* 8.4 6.4   NEUTSPOLYS 76.30* 60.40 52.90   LYMPHOCYTES 16.30* 25.70 33.10   MONOCYTES 4.70 12.30 10.20   EOSINOPHILS 0.20 0.70 3.00   BASOPHILS 0.60 0.50 0.50   ASTSGOT 12  --   --    ALTSGPT 10  --   --    ALKPHOSPHAT 143*  --   --    TBILIRUBIN 0.2  --   --      Recent Labs     10/13/19  1815 10/15/19  0825 10/16/19  0415   RBC 5.57* 4.38 4.02*   HEMOGLOBIN 13.4 10.6* 10.0*   HEMATOCRIT 47.2* 34.1* 31.1*   PLATELETCT 470* 231 178       Imaging  No new imaging    Assessment/Plan  * Diabetic ketoacidosis with coma associated with type 1 diabetes mellitus (HCC)- (present on admission)  Assessment & Plan  Underlying cause: suspect viral infection give prodrome of n/v/d and sinus congestion    Trop-nomal  EKG-no iscehmia  Utox-opiates  UA-repeat UA contaminate  CXR no infiltrate    Doing well on home insulin. Slightly low BG this am (66) but she's so brittle will not make changes to long acting insulin        Hypophosphatemia  Assessment & Plan  Aggressive repletion  milk    Leukocytosis- (present on admission)  Assessment & Plan  resolved    Gastroparesis due to DM (HCC)- (present on admission)  Assessment & Plan  Home reglan PO    Hypokalemia- (present on admission)  Assessment & Plan  Schedule K as she continues to be low    Diabetes type I (CMS-HCC)- (present on admission)  Assessment & Plan  Long-term type 1 diabetes  Multiple admits for DKA, very brittle patient  Reports excellent med compliance and switching injection sites  Mom states she is very compliant but  hemoglobin A1c of 14 suggest that is not the case.    Diabetic education when clinically appropriate  Needs referral for outpatient endocrinologist and new glucometer on discharge  Seen at [facility]    Thrombocytosis (HCC)- (present on admission)  Assessment & Plan  Mild, felt secondary to hemoconcentration, serial CBC to monitor    Port-A-Cath in place- (present on admission)  Assessment & Plan  Patient with multiple admits for DKA and diabetic complication gastroparesis  Difficult IV access noted in the past  Status post right anterior chest port  No signs of infection, monitoring    Elevated alkaline phosphatase level- (present on admission)  Assessment & Plan  Mildly elevated alkaline phosphatase, unclear etiology, present in the past  Serial CMP  Further evaluation is clinically prudent       VTE:  Lovenox  Ulcer: Not Indicated  Lines: port    I have performed a physical exam and reviewed and updated ROS and Plan today (10/16/2019). In review of yesterday's note (10/15/2019), there are no changes except as documented above.     Discussed patient condition and risk of morbidity and/or mortality with RN, RT, Pharmacy, [facility] Gold resident and Charge nurse / hot rounds

## 2019-10-16 NOTE — CARE PLAN
Problem: Bowel/Gastric:  Goal: Normal bowel function is maintained or improved  Intervention: Educate patient and significant other/support system about diet, fluid intake, medications and activity to promote bowel function  Note:   Pt is being able to take PO meds. She is being encouraged to order meals that is interested in eating.      Problem: Bowel/Gastric:  Goal: Normal bowel function is maintained or improved  Intervention: Educate patient and significant other/support system about signs and symptoms of constipation and interventions to implement  Note:   Pt given PO Reglan. Encouraged to engage in activity.

## 2019-10-17 NOTE — DISCHARGE SUMMARY
Internal Medicine Discharge Summary  Note Author: Daphne Ta M.D.       Name Alis Sparks     1990   Age/Sex 29 y.o. female   MRN [id number]         Admit Date:  10/13/2019       Discharge Date:   10/17/2018    Service:   [facility] Internal Medicine Gold/Red Team  Attending Physician(s):   Dr. Boyle, Dr. Chen       Senior Resident(s):   Dr. Tiwari/ Dr. Ta  [place] Resident(s):   Dr. Rodriguez  PCP: Bruna Laboy M.D.      Primary Diagnosis:   DKA with T1DM    Secondary Diagnoses:                Principal Problem:    Diabetic ketoacidosis with coma associated with type 1 diabetes mellitus (HCC) POA: Yes  Active Problems:    Diabetes type I (CMS-HCC) POA: Yes    Hypokalemia POA: Yes    Gastroparesis due to DM (HCC) POA: Yes    Leukocytosis POA: Yes    Hypophosphatemia POA: Unknown    Elevated alkaline phosphatase level POA: Yes    Port-A-Cath in place POA: Yes    Thrombocytosis (HCC) POA: Yes  Resolved Problems:    * No resolved hospital problems. *      Hospital Summary (Brief Narrative):       The patient is a 29 year old female with PMHx of Inuslin dependent uncontrolled type 1 Diabetes Mellitus , multiple admisssions for DKA , Gastroparesis was admitted on 10/13/19. In the ER she was found to have blood sugar 660, bicarb level less than 5. Beta hydroxy butyrate is greater than 8. She received IVF in the ER and was admitted to the ICU. She was managed per DKA protocol - received Insulin drip infusion along with appropriate continuous fluid infusion. She has port A cath placed for easy venous access which was placed in one of her previous admissions due to multiple DKA admissions and poor venous access. During hospital stay her symptoms resolved, anion gap closed. Patient was transitioned off DKA protocol on 10/15/19, transferred to floor on 10/16/2019. She was restarted with her home insulin regimen, diabetic education was done, all her needed supplies were prescribed. She will  F/U at [facility] in 1 week with her PCP, she will need a referral to establish care with Endocrinology.        Patient /Hospital Summary (Details -- Problem Oriented) :          DKA: See hospital course  T1DM: HbA1c 14% on admission, h/o noncompliance. See hospital course.   Gastroparesis: On Reglan, resumed.   Electrolyte imbalance: Repleted as needed.   Thrombocytosis : Likely 2/2 dehydration from DKA, resolved  Elevated ALP: Mildly elevated, T augustina normal. F/U with PCP outpatient         Consultants:     Critical care     Procedures:        NA    Imaging/ Testing:      DX-CHEST-PORTABLE (1 VIEW)   Final Result      No acute cardiopulmonary process is seen.      Linear metallic density projects over the right lung base. Clinical correlation recommended.             Discharge Medications:         Medication Reconciliation: Completed       Medication List      START taking these medications      Instructions   Blood Glucose Test Strips   Doctor's comments:  Or per formulary preference. ICD-10 code: E10.65 - Uncontrolled type 1 Diabetes Mellitus  Use one True Metrix strip to test blood sugar four times daily.     Lancets   Doctor's comments:  Or per formulary preference. ICD-10 code: E10.65 - Uncontrolled type 1 Diabetes Mellitus  Use one True Metrix lancet to test blood sugar 4 times daily.     Urine Glucose-Ketones Test Strp   1 Strip by In Vitro route 2 Times a Day.  Dose:  1 Strip        CONTINUE taking these medications      Instructions   ascorbic acid 500 MG tablet  Commonly known as:  VITAMIN C   Take 1 Tab by mouth every day.  Dose:  500 mg     ferrous sulfate 325 (65 Fe) MG tablet   Take 1 Tab by mouth every day.  Dose:  325 mg     insulin glargine 100 UNIT/ML Sopn injection  Commonly known as:  LANTUS   Inject 30 Units as instructed 2 Times a Day.  Dose:  30 Units     insulin lispro (Human) 100 UNIT/ML injection PEN  Commonly known as:  HUMALOG   Inject 8 Units as instructed 3 times a day before  meals.  Dose:  8 Units     metoclopramide 10 MG Tabs  Commonly known as:  REGLAN   Take 1 Tab by mouth 4 times a day.  Dose:  10 mg     omeprazole 20 MG delayed-release capsule  Commonly known as:  PRILOSEC   Take 1 Cap by mouth every day.  Dose:  20 mg     ondansetron 4 MG Tbdp  Commonly known as:  ZOFRAN ODT   Take 1 Tab by mouth every four hours as needed for Nausea (give PO if no IV route available).  Dose:  4 mg     SUMAtriptan 50 MG Tabs  Commonly known as:  IMITREX   Take 1 Tab by mouth Once PRN for Migraine. Can repeat in 2 hrs if needed  Dose:  50 mg                  Disposition:   Home    Diet:   Diabetic    Activity:   Regular as tolerated     Instructions:         The patient was instructed to return to the ER in the event of worsening symptoms. I have counseled the patient on the importance of compliance and the patient has agreed to proceed with all medical recommendations and follow up plan indicated above.   The patient understands that all medications come with benefits and risks. Risks may include permanent injury or death and these risks can be minimized with close reassessment and monitoring.        Primary Care Provider:      Discharge summary faxed to primary care provider:  Deferred  Copy of discharge summary given to the patient: Deferred      Follow up appointment details :        Bruna Laboy M.D.  [address] [place] [id number]  [phone]    In 1 week      Bruna Laboy M.D.  [address] [address]  [phone]          Marino Roach M.D.  [address]  [address] [address]  [phone]    In 2 weeks      Bruna Laboy M.D.  [address] [address]  [phone]    In 1 week        Pending Studies:        None    Time spent on discharge day patient visit, preparing discharge paperwork and arranging for patient follow up.    Summary of follow up issues:   Will need endocrinology referral     Discharge Time (Minutes) :    [address] [place] [address]  Course Type:  Inpatient Stay >2 midnights      Condition on Discharge  Stable   ______________________________________________________________________    Interval history/exam for day of discharge:    No acute overnight events.   No acute medical complains.   Tolerating PO diet.     Physical Exam   Constitutional: She is oriented to person, place, and time. No distress.   HENT:   Head: Normocephalic and atraumatic.   Moist mucus membranes    Eyes: Pupils are equal, round, and reactive to light. Conjunctivae and EOM are normal.   Neck: Normal range of motion. Neck supple. No JVD present.   Cardiovascular: Regular rate and rhythm.   No murmur heard.    Pulmonary/Chest: Effort normal and breath sounds normal. No respiratory distress.   Abdominal: Soft. Bowel sounds are normal. She exhibits no distension. There is no tenderness.   Musculoskeletal: Normal range of motion. She exhibits no edema.   Lymphadenopathy:     She has no cervical adenopathy.   Neurological: She is alert and oriented to person, place, and time. No cranial nerve deficit.   Skin: Skin is warm and dry. She is not diaphoretic.   Psychiatric: Affect normal.   Nursing note and vitals reviewed.    Most Recent Labs:    Lab Results   Component Value Date/Time    WBC 5.5 10/17/2019 04:32 AM    RBC 4.20 10/17/2019 04:32 AM    HEMOGLOBIN 9.9 (L) 10/17/2019 04:32 AM    HEMATOCRIT 32.5 (L) 10/17/2019 04:32 AM    MCV 77.4 (L) 10/17/2019 04:32 AM    MCH 23.6 (L) 10/17/2019 04:32 AM    MCHC 30.5 (L) 10/17/2019 04:32 AM    MPV 10.3 10/17/2019 04:32 AM    NEUTSPOLYS 48.40 10/17/2019 04:32 AM    LYMPHOCYTES 38.00 10/17/2019 04:32 AM    MONOCYTES 9.70 10/17/2019 04:32 AM    EOSINOPHILS 3.30 10/17/2019 04:32 AM    BASOPHILS 0.40 10/17/2019 04:32 AM    HYPOCHROMIA 1+ 09/07/2014 03:00 PM    ANISOCYTOSIS 1+ 10/13/2019 06:15 PM      Lab Results   Component Value Date/Time    SODIUM 143 10/17/2019 04:32 AM    POTASSIUM 3.8 10/17/2019 04:32 AM    CHLORIDE 114 (H) 10/17/2019  04:32 AM    CO2 23 10/17/2019 04:32 AM    GLUCOSE 97 10/17/2019 04:32 AM    BUN 9 10/17/2019 04:32 AM    CREATININE 0.44 (L) 10/17/2019 04:32 AM      Lab Results   Component Value Date/Time    ALTSGPT 10 10/13/2019 06:15 PM    ASTSGOT 12 10/13/2019 06:15 PM    ALKPHOSPHAT 143 (H) 10/13/2019 06:15 PM    TBILIRUBIN 0.2 10/13/2019 06:15 PM    DBILIRUBIN 0.1 08/17/2011 03:00 AM    LIPASE 26 10/14/2019 02:02 AM    ALBUMIN 4.6 10/13/2019 06:15 PM    GLOBULIN 3.4 10/13/2019 06:15 PM    PREALBUMIN 15.0 (L) 12/25/2018 01:42 AM    INR 1.26 (H) 07/27/2019 09:25 AM    MACROCYTOSIS 1+ 10/19/2013 07:45 AM     Lab Results   Component Value Date/Time    PROTHROMBTM 16.1 (H) 07/27/2019 09:25 AM    INR 1.26 (H) 07/27/2019 09:25 AM

## 2019-10-17 NOTE — PROGRESS NOTES
Community Health Worker Intake  • Social determinates of health intake complete.   • Identified barriers to none.  • Contact information provided to Alis Sparks.      CHW Grecia met with pt to introduce CCM services. Pt indicated no transportation issue, no food insecurity, no trouble paying for resources. Meds to beds interested. Pt currently lives in a home with mom which is her support system. Pt uses MTM. Pt is confident in managing her health after d/c. Pt's pcp is Bruna Laboy. No follow up appointment yet.    Plan: Follow up call after d/c.       10/22. Made 3x follow up call attempts. Pt will be d/c from CCM services.

## 2019-10-17 NOTE — CARE PLAN
Problem: Bowel/Gastric:  Goal: Normal bowel function is maintained or improved  Outcome: MET   Patient had bowl movement this shift

## 2019-10-17 NOTE — PROGRESS NOTES
Bedside report received from Krystina HCAVIRA . Assumed care of pt at 1900 after transferring from Telemetry. Pt is resting at this time with equal chest rise and no signs of distress. Plan of care discussed with pt. Pt is A&O x 4. Pt is on 0 L of oxygen. Bed alarm is not in use, bed in lowest position, bed rails up x 2, belongings and call light within reach. Hourly rounding in place.       Last assessment was completed at 1600 on telemetry unit and this RN agrees with Erika Meek assessment.

## 2019-10-17 NOTE — PROGRESS NOTES
[facility] ICU Transfer Note                                                                                         ICU Admit Date: 10/13/2019       ICU Discharge Date:  10/17/19        Primary Diagnosis:   Diabetic ketoacidosis with coma associated with type 1 diabetes mellitus (HCC)      ICU Course Summary (Brief Narrative):  The patient is a 29 year old female with PMHx of Inuslin dependent type 1 Diabetes Mellitus , multiple admisssions for DKA , Gastroparesis was admitted on 10/13/19. In the ER she was found to have blood sugar 660, bicarb level less than 5. Beta hydroxy butyrate is greater than 8. She received IVF in the ER and Admitted to the ICU with DKA.She received Insulin drip infusion along with IVF. She has port A cath place for easy venous access which was placed as she was admitted for gastroparesis, DKA multiple times in the past.Her glycohemoglobin on admission was 14%. Anion gap is closed, patient is transitioned off DKA protocol on 10/15/19. She is on Subcutaneous Insulin and transferred to the Floor. She will require Diabetic education, outpatient set up with endocrinology prior to discharge for poorly controlled Type 1 DM.         Important Events in the ICU:   - Central Line: None   - Intubation: None   - Pressors: None   - Pitt catheter: None  - Tube feeding: None  - Antibiotics: None   - Other procedures: None         Labs and imaging studies to be continued with their indications:  - CBC: Yes;   - CMP or BMP: Yes;   - Magnesium: Yes;  - Phosphorus: Yes   - Chest X-ray: No; unless having worsening respiratory status  - Other studies: None         Things to follow:   -  Optimize Blood sugars, patient is on Lantuss 30 units BID, Sliding Scale at home  - Replete Electrolytes as needed   - diabetic education and counseling regarding compliance

## 2019-10-17 NOTE — DISCHARGE INSTRUCTIONS
Discharge Instructions    Discharged to home by car with friend. Discharged via walking, hospital escort: Refused.  Special equipment needed: Not Applicable    Be sure to schedule a follow-up appointment with your primary care doctor or any specialists as instructed.     Discharge Plan:   Diet Plan: Discussed  Activity Level: Discussed  Confirmed Follow up Appointment: Patient to Call and Schedule Appointment  Confirmed Symptoms Management: Discussed  Influenza Vaccine Indication: Patient Refuses    I understand that a diet low in cholesterol, fat, and sodium is recommended for good health. Unless I have been given specific instructions below for another diet, I accept this instruction as my diet prescription.   Other diet: Diabetic    Special Instructions: None    · Is patient discharged on Warfarin / Coumadin?   No     Depression / Suicide Risk    As you are discharged from this [facility] facility, it is important to learn how to keep safe from harming yourself.    Recognize the warning signs:  · Abrupt changes in personality, positive or negative- including increase in energy   · Giving away possessions  · Change in eating patterns- significant weight changes-  positive or negative  · Change in sleeping patterns- unable to sleep or sleeping all the time   · Unwillingness or inability to communicate  · Depression  · Unusual sadness, discouragement and loneliness  · Talk of wanting to die  · Neglect of personal appearance   · Rebelliousness- reckless behavior  · Withdrawal from people/activities they love  · Confusion- inability to concentrate     If you or a loved one observes any of these behaviors or has concerns about self-harm, here's what you can do:  · Talk about it- your feelings and reasons for harming yourself  · Remove any means that you might use to hurt yourself (examples: pills, rope, extension cords, firearm)  · Get professional help from the community (Mental Health, Substance Abuse,  psychological counseling)  · Do not be alone:Call your Safe Contact- someone whom you trust who will be there for you.  · Call your local CRISIS HOTLINE 499-0140 or 523-576-3361  · Call your local Children's Mobile Crisis Response Team [place] (963) 588-4128 or www.Rangespan  · Call the toll free National Suicide Prevention Hotlines   · National Suicide Prevention Lifeline 484-862-XWYG (6525)  · Cl[facility] Hope Line Network 800-SUICIDE (001-6146)      Diabetic Ketoacidosis  Diabetic ketoacidosis is a life-threatening complication of diabetes. If it is not treated, it can cause severe dehydration and organ damage and can lead to a coma or death.  What are the causes?  This condition develops when there is not enough of the hormone insulin in the body. Insulin helps the body to break down sugar for energy. Without insulin, the body cannot break down sugar, so it breaks down fats instead. This leads to the production of acids that are called ketones. Ketones are poisonous at high levels.  This condition can be triggered by:  · Stress on the body that is brought on by an illness.  · Medicines that raise blood glucose levels.  · Not taking diabetes medicine.  What are the signs or symptoms?  Symptoms of this condition include:  · Fatigue.  · Weight loss.  · Excessive thirst.  · Light-headedness.  · Fruity or sweet-smelling breath.  · Excessive urination.  · Vision changes.  · Confusion or irritability.  · Nausea.  · Vomiting.  · Rapid breathing.  · Abdominal pain.  · Feeling flushed.  How is this diagnosed?  This condition is diagnosed based on a medical history, a physical exam, and blood tests. You may also have a urine test that checks for ketones.  How is this treated?  This condition may be treated with:  · Fluid replacement. This may be done to correct dehydration.  · Insulin injections. These may be given through the skin or through an IV tube.  · Electrolyte replacement. Electrolytes, such as potassium  and sodium, may be given in pill form or through an IV tube.  · Antibiotic medicines. These may be prescribed if your condition was caused by an infection.  Follow these instructions at home:  Eating and drinking  · Drink enough fluids to keep your urine clear or pale yellow.  · If you cannot eat, alternate between drinking fluids with sugar (such as juice) and salty fluids (such as broth or bouillon).  · If you can eat, follow your usual diet and drink sugar-free liquids, such as water.  Other Instructions   · Take insulin as directed by your health care provider. Do not skip insulin injections. Do not use  insulin.  · If your blood sugar is over 240 mg/dL, monitor your urine ketones every 4-6 hours.  · If you were prescribed an antibiotic medicine, finish all of it even if you start to feel better.  · Rest and exercise only as directed by your health care provider.  · If you get sick, call your health care provider and begin treatment quickly. Your body often needs extra insulin to fight an illness.  · Check your blood glucose levels regularly. If your blood glucose is high, drink plenty of fluids. This helps to flush out ketones.  Contact a health care provider if:  · Your blood glucose level is too high or too low.  · You have ketones in your urine.  · You have a fever.  · You cannot eat.  · You cannot tolerate fluids.  · You have been vomiting for more than 2 hours.  · You continue to have symptoms of this condition.  · You develop new symptoms.  Get help right away if:  · Your blood glucose levels continue to be high (elevated).  · Your monitor reads “high” even when you are taking insulin.  · You faint.  · You have chest pain.  · You have trouble breathing.  · You have a sudden, severe headache.  · You have sudden weakness in one arm or one leg.  · You have sudden trouble speaking or swallowing.  · You have vomiting or diarrhea that gets worse after 3 hours.  · You feel severely fatigued.  · You have  trouble thinking.  · You have abdominal pain.  · You are severely dehydrated. Symptoms of severe dehydration include:  ¨ Extreme thirst.  ¨ Dry mouth.  ¨ Blue lips.  ¨ Cold hands and feet.  ¨ Rapid breathing.  This information is not intended to replace advice given to you by your health care provider. Make sure you discuss any questions you have with your health care provider.  Document Released: 12/15/2001 Document Revised: 05/25/2017 Document Reviewed: 11/25/2015  [employer] Interactive Patient Education © 2017 [employer]

## 2019-10-17 NOTE — PROGRESS NOTES
Assumed patient care at 0700. Received report from night shift. Assessment completed. A&Ox 4. Denies pain at this time. Pt is up sefl, steady gait. Fall precautions in place; pt  Wearing treaded socks, personal possessions and call light placed within reach.  POC discussed with pt, potential discharge today. Awaitng diabetic education. Communication board updated. Patient denies any additional needs at this time.

## 2019-10-17 NOTE — CARE PLAN
Problem: Communication  Goal: The ability to communicate needs accurately and effectively will improve  10/16/2019 2216 by Gato Villar RFAUSTINA.  Outcome: PROGRESSING AS EXPECTED  Pt is able to express needs verbally bu using the call light.    Problem: Safety  Goal: Will remain free from injury  10/16/2019 2216 by Gato Villar R.N.  Outcome: PROGRESSING AS EXPECTED  Bed is locked and in lowest position. Call light and table within reach. Non-skid socks on. Bed alarm is not in use.

## 2019-10-17 NOTE — PROGRESS NOTES
Called report to 5th floor RN.  Discussed blood sugar of 60 on morning labs and that patient might need a snack at night.

## 2019-10-17 NOTE — PROGRESS NOTES
Diabetes education: Met with pt this am. Please see consult note.  Plan: Pt will only need prescriptions for True Metrix meter and lancets to test 4 x day, and urine ketone test strips. Prescriptions need to have dx code ( E10.65) directions, quantity and refills for Medicaid to cover. Provider needs to have Medicaid privileges.  Pt has Meds to Beds. CDE called Laure to make her aware of possible discharge.

## 2019-10-17 NOTE — DISCHARGE PLANNING
Medicaid Meds-to-Beds: Discharge prescription orders listed below delivered to patient's bedside. Patient counseled.       Alis Sparks   Home Medication Instructions MELISSA:[id number]    Printed on:10/17/19 9054   Medication Information                      Blood Glucose Test Strips  Use one True Metrix strip to test blood sugar four times daily.             Lancets  Use one True Metrix lancet to test blood sugar 4 times daily.               Pt is aware that urine glucose-ketone strips were not covered by her insurance and she is willing to purchase them OTC.    Kelsey Lyon, PharmD

## 2019-10-17 NOTE — CONSULTS
Diabetes education: Met with Alis, know from previous admits. Pt was admitted with blood sugar of 660 ( on 10/14 went up to 1136) with DKA and gastroparesis. Current Hg a1c was 14.0%. Pt follows with [facility]. Pt takes Basaglar 30 units BID and Admelog per pt with carb ratio and correction.  Pt is currently on Lantus 30 units BID and Regular insulin sliding scale coverage ac and hs ( has type one and should be getting a meal bolus as well).  Pt states she has her insulin ( just got refilled before admit), rotates sites, pen use reviewed. Reviewed sick day. Pt does not have ketone test strips at home. Discussed how to get from [employer] for $6.99 a box.   Pt has a one touch ultra meter that works but does not allow pt to access memory. She has a few test strips but pays for them herself as they are not covered by [place] Medicaid ( she states her Medicaid changes every year. Reviewed how she can ask for the same medicaid upon renewal).  Plan: Pt will only need prescriptions for True Metrix meter and lancets to test 4 x day, and urine ketone test strips. Prescriptions need to have dx code ( E10.65) directions, quantity and refills for Medicaid to cover. Provider needs to have Medicaid privileges.  Pt has Meds to Beds. CDE called Laure to make her aware of possible discharge.      Previous education/visits    Marline Cameron R.N.   Diabetes Health Educator      Consults   Signed   Date of Service:  7/10/2019 12:48 PM                    Diabetes education: Pt has a hx of type one diabetes and is known from previous visits. Pt admitted in DKA with blood sugar of 569 and last Hg a1c was 12.6% and was from 3/20/19. Pt remains on an insulin gtt. Pt remains with some nausea /vomiting.  Per EPIC, pt uses Humalog and Basaglar. CDE to clarify insulins and as well as supplies at home.  Plan: CDE will meet with pt before discharge to assess needs if any. Please call [phone] when discharge plans known.        Last visit  "was admit of 12/18/18:     Marline Cameron R.N.      Diabetes education: Met with Alis, who was sleeping but woke when name called without problem. Pt is known from previous admits.  Some question regarding pt having fast acting insulin at home. Pt's insurance changed from Apidra to Admelog and pt did not have a new prescription.   Plan: Pt has had education related to diabetes multiple times. Pt was open to discussing needs at this visit.  Pt will prescriptions for Admelog solostar pens ( box of five), and strips and lancets for True metrix to test four times a day. All prescriptions need to have dx code (E10.65), directions, quantity and refills for Medicaid to cover. Pt states she has Basaglar, and pen needles at home. Please call [phone] if needs change.           Previous visit:      Progress Notes  Date of Service: 6/28/2018 7:05 PM  Marline Cameron R.N.         Diabetes education: met with patient and mom to review diabetes management. Pt states she has all her supplies and insulin at home. Reviewed insulin pen, storage, shelf life and site rotation. Pt states she is feeling better and is eating now.  Pt hopes to go home tomorrow. Pt has type one diabetes and now that she is eating should be on both a meal bolus as well as a correction for her blood sugars. Pt uses a correction and a 1: 6 carb ratio at home. Finger sticks should be ac and hs (not every six hours) and if possible limit fast acting insulin at hs. Please call [phone] if needs change.           Consults  Date of Service: 6/21/2018 8:27 PM  Marline Cameron R.N.         Diabetes education: Pt known from previous admits. Pt has not yet been seen as pt continues to have nausea and GI recommendation is to have \"dark quiet room without frequent disturbance of patient\".  Plan: Alana CHAVIRA CDE to meet with patient tomorrow if appropriate. Please call [phone] if needs change.           Consults  Date of Service: 11/3/2015 1:37 PM  Marline Cameron R.N. "      Diabetes education: Alis has hx of type one diabetes, and is known from previous admits:                          Consults by Marline Cameron R.N. at 9/8/2015 3:59 PM        Author: Marline Cameron R.N. Service: (none) Author Type: Diabetes Health Educator       Filed: 9/8/2015 4:03 PM Note Time: 9/8/2015 3:59 PM Status: Signed       : Marline Cameron R.N. (Diabetes Health Educator)                     Diabetes education: Pt is well known from previous admits:                     Consults by Mariah Fernández R.N. at 11/25/2014 11:22 AM      Author: Mariah Fernández R.N.  Service: (none)  Author Type: Registered Nurse      Filed: 11/25/2014 11:28 AM  Note Time: 11/25/2014 11:22 AM  Status: Signed      : Mariah Fernández R.N. (Registered Nurse)              Diabetes Education: Reviewed sick day management with Alis. She has a new meter at home and has seen the urine ketone strips before. We reviewed checking her urine ketones when she is sick or if her blood sugar is over 300. She has an insulin correction of 1:50>150 and verbalized understanding of correcting for ketones. She was encouraged to make up a sick day box and have every thing she needs in it including her sick day instructions. She states she has a prescription for Zofran if she gets nauseated. She was provided written instructions to refer to. She states she gets insurance January 1, 2015 and will get a doctor. She states she understands how hard it is on her to keep going into DKA and wants to start taking better care of herself.                                Consults by Marline Cameron R.N. at 11/24/2014 5:18 PM      Author: Marline Cameron R.N.  Service: (none)  Author Type: Diabetes Health Educator      Filed: 11/24/2014 5:23 PM  Note Time: 11/24/2014 5:18 PM  Status: Signed      : Marline Cameron R.N. (Diabetes Health Educator)             Diabetes education: Alis well known to this CDE from previous admits. Issues were  "supplies but with last admit, she reports she has insulin and strips. Handouts were given to her regarding [employer]ARE CHEST, and [place] [employer]. Please refer to consult and progress notes for 11/9/14 admission (specifically 11/13/14).  She states this time her blood sugars were 293 prior to admit but increased to over 700 when she \"got here\".  She states they normally are not that high. Asked about testing for ketones and adding more insulin and she states she was never taught that.  Pt was just receiving medication for nausea. Will defer education until tomorrow or Wednesday.      Plan: discuss ketones, insulin and DKA prior to discharge when patient more appropriate. Please call [phone] if needs change.             Plan: Pt has just been moved to  and is currently sleeping. Questions regarding insulin coverage from  note. Mom not available. Pt was on Novolog and Lantus, but her insurance covers Apidra. There was some question about prior auth. Not sure if she cannot tolerate Apidra or did not know to have it changed. CDE will follow up with her tomorrow. Please call [phone] if needs change.                                                Author: Marline Cameron R.N. Service: (none) Author Type: Diabetes Health Educator       Filed: 9/9/2015 1:33 PM Note Time: 9/9/2015 1:17 PM Status: Signed     : Marline Cameron R.N. (Diabetes Health Educator)            Diabetes education: Met with Alis regarding insulin needs. Pt currently has [employer] which only covers Lantus and Apidra insulin. Pt states she was told by the [facility] pharmacy, they would cover Levemir and Novolog only for a short period of time and then would need a prior auth. Pt has used Lantus in the hospital without problems (currently on 15 units every 12 hours). She has never used Apidra (which has more of a \"tale\", and may work better for her gastroparesis).  Pt is struggling with eating. She took her insulin, ate breakfast well, and " then threw up. Pt has not received any Humalog coverage since yesterday at 1658. She normally follows a 1:6 carbohydrate ratio with a correction of 1 unit for every 50 mg/dl above 150 (scale starting at 200) for meals only. She states she was taking 25 units of Levermir in am and 22 units in pm. Taking much less here and still with lower blood sugars : : 64 ( 76,and 85 after treating the low) and 91. 9/8: 110, 184, 221 and 169. Pt may benefit with decreasing pm Lantus and when able to tolerate food changing sliding scale coverage to meals only, starting at 200 and adding carbohydrate ratio of 1:6 again meals only.   Plan: As above as well as at discharge patient will need a prescription for Apidra and Lantus insulins to get coverage from her current insurance (pt will be changing medicaid in October). Please call [phone] if needs change.                 Pt is now on Medicaid HMO not [employer]. Not sure what are her concerns this admit, as she is sleeping. Pt is getting 25 units of Lantus HS and Humalog sliding scale every six hours. Pt would benefit from having coverage ac and hs when she begins to eat . At this point she has not required any Humalo, 88 and 131.   Plan: CDE will follow up tomorrow. Please call [phone] if needs change.               Progress Notes  Date of Service: 2015 3:12 PM  Marline Cameron R.N.      Diabetes education: Met with Alis, who states she is feeling much better. Asked what happened, she states she was fine at work, came home, began to eat, and after first few bites became sick. States now with insurance she has her insulin (pens) and supplies and takes her insulin, checks her blood sugar, checks for ketones when sick, rotates sites, primes pen, holds at sight for 10 seconds, and does not use pen longer than 28 days.  Reviewed sick day, she knows to come in if not able to keep fluid down ( was able at first than later not ).  Pt will be setting up an appointment with an  endocrinologist (had to clear up coverage with insurance).  Questions answered .  Please call [phone] if needs change.

## 2019-10-17 NOTE — CARE PLAN
Problem: Knowledge Deficit  Goal: Knowledge of disease process/condition, treatment plan, diagnostic tests, and medications will improve  10/17/2019 1241 by Rachel Desai R.N.  Note:   Patient received diabetic education by RN educator. MD updated, possible discharge home today.   10/17/2019 1241 by Rachel Desai R.N.  Reactivated  Goal: Knowledge of the prescribed therapeutic regimen will improve  Reactivated

## 2019-10-17 NOTE — PROGRESS NOTES
· 2 RN skin check complete with Krystina CHAVIRA  · Devices in place- right chest port   · Skin assessed under devices- yes  · Confirmed pressure ulcers found on N/A  · Also noted- Elbows are dry and red but blanching, heels are dry and cracked  · The following interventions are in place - pressure redistribution mattress, encouragement to mobilize and turn as much as possible

## 2019-10-18 NOTE — PROGRESS NOTES
Report received from Krystina CHAVIRA in regards to pt discharging soon. Pt walked off the unit with mom. Discharge packet and medications in hand.

## 2019-10-18 NOTE — PSYCHIATRY
Patient ready to discharge, awaiting ride from family. Port de-accessed, heparin locked, site CDI. Meds to bed delivered to patient. Discharge instructions provided: regarding diagnosis, new and continued medications, as well as PCP follow up and referral to endocrinologist.

## 2019-10-29 NOTE — DOCUMENTATION QUERY
[facility] hospital                                                                       Query Response Note      PATIENT:               AMANDA BRANCH  ACCT #:                  [id number]  MRN:                     [id number]  :                      1990  ADMIT DATE:       10/13/2019 5:55 PM  DISCH DATE:        10/17/2019 7:29 PM  RESPONDING  PROVIDER #:        [id number]           QUERY TEXT:    Elevated sodium levels are noted in the Medical Record. Can a diagnosis be specified to support this finding?    NOTE:  If an appropriate response is not listed below, please respond with a new note.    The patient's Clinical Indicators include:  Na 140, 147, 147, 146 ,148, 149, 140, 148    Treatment:   IV NS bolus & monitor labs     Risk Factors:   DM1, Diabetic ketoacidosis with coma, gastroparesis, hypophosphatemia, hypokalemia, & dehydration  Options provided:   -- Hypernatremia   -- Findings are clinically insignificant   -- Unable to determine      Query created by: Yessenia Matthew on 10/28/2019 8:35 AM    RESPONSE TEXT:    Hypernatremia          Electronically signed by:  GAYLA STANFORD 10/29/2019 12:24 PM

## 2020-01-01 ENCOUNTER — APPOINTMENT (OUTPATIENT)
Dept: RADIOLOGY | Facility: MEDICAL CENTER | Age: 30
DRG: 637 | End: 2020-01-01
Attending: EMERGENCY MEDICINE
Payer: MEDICAID

## 2020-01-01 ENCOUNTER — HOSPITAL ENCOUNTER (INPATIENT)
Facility: MEDICAL CENTER | Age: 30
LOS: 1 days | DRG: 153 | End: 2020-03-23
Attending: EMERGENCY MEDICINE | Admitting: INTERNAL MEDICINE
Payer: MEDICAID

## 2020-01-01 ENCOUNTER — PATIENT OUTREACH (OUTPATIENT)
Dept: HEALTH INFORMATION MANAGEMENT | Facility: OTHER | Age: 30
End: 2020-01-01

## 2020-01-01 ENCOUNTER — HOSPITAL ENCOUNTER (INPATIENT)
Dept: RADIOLOGY | Facility: MEDICAL CENTER | Age: 30
DRG: 637 | End: 2020-05-21
Attending: INTERNAL MEDICINE | Admitting: HOSPITALIST
Payer: MEDICAID

## 2020-01-01 ENCOUNTER — APPOINTMENT (OUTPATIENT)
Dept: RADIOLOGY | Facility: MEDICAL CENTER | Age: 30
DRG: 637 | End: 2020-01-01
Attending: INTERNAL MEDICINE
Payer: MEDICAID

## 2020-01-01 ENCOUNTER — APPOINTMENT (OUTPATIENT)
Dept: CARDIOLOGY | Facility: MEDICAL CENTER | Age: 30
DRG: 637 | End: 2020-01-01
Attending: INTERNAL MEDICINE
Payer: MEDICAID

## 2020-01-01 ENCOUNTER — APPOINTMENT (OUTPATIENT)
Dept: RADIOLOGY | Facility: MEDICAL CENTER | Age: 30
DRG: 153 | End: 2020-01-01
Attending: EMERGENCY MEDICINE
Payer: MEDICAID

## 2020-01-01 ENCOUNTER — HOSPITAL ENCOUNTER (INPATIENT)
Facility: MEDICAL CENTER | Age: 30
LOS: 1 days | DRG: 637 | End: 2020-05-21
Attending: EMERGENCY MEDICINE | Admitting: HOSPITALIST
Payer: MEDICAID

## 2020-01-01 VITALS
SYSTOLIC BLOOD PRESSURE: 75 MMHG | TEMPERATURE: 97 F | HEIGHT: 65 IN | DIASTOLIC BLOOD PRESSURE: 52 MMHG | WEIGHT: 158.73 LBS | RESPIRATION RATE: 36 BRPM | OXYGEN SATURATION: 56 % | BODY MASS INDEX: 26.45 KG/M2

## 2020-01-01 VITALS
HEART RATE: 98 BPM | OXYGEN SATURATION: 95 % | HEIGHT: 65 IN | SYSTOLIC BLOOD PRESSURE: 114 MMHG | WEIGHT: 172.84 LBS | DIASTOLIC BLOOD PRESSURE: 77 MMHG | BODY MASS INDEX: 28.8 KG/M2 | RESPIRATION RATE: 18 BRPM | TEMPERATURE: 97.4 F

## 2020-01-01 DIAGNOSIS — E08.10 DIABETIC KETOACIDOSIS WITHOUT COMA ASSOCIATED WITH DIABETES MELLITUS DUE TO UNDERLYING CONDITION (HCC): ICD-10-CM

## 2020-01-01 DIAGNOSIS — R73.9 HYPERGLYCEMIA: ICD-10-CM

## 2020-01-01 DIAGNOSIS — H65.02 NON-RECURRENT ACUTE SEROUS OTITIS MEDIA OF LEFT EAR: ICD-10-CM

## 2020-01-01 DIAGNOSIS — R11.2 INTRACTABLE VOMITING WITH NAUSEA, UNSPECIFIED VOMITING TYPE: ICD-10-CM

## 2020-01-01 LAB
ACTION RANGE TRIGGERED IACRT: YES
ALBUMIN SERPL BCP-MCNC: 2.6 G/DL (ref 3.2–4.9)
ALBUMIN SERPL BCP-MCNC: 3.1 G/DL (ref 3.2–4.9)
ALBUMIN SERPL BCP-MCNC: 3.6 G/DL (ref 3.2–4.9)
ALBUMIN SERPL BCP-MCNC: 3.9 G/DL (ref 3.2–4.9)
ALBUMIN/GLOB SERPL: 1.1 G/DL
ALBUMIN/GLOB SERPL: 1.3 G/DL
ALBUMIN/GLOB SERPL: 1.4 G/DL
ALBUMIN/GLOB SERPL: 1.6 G/DL
ALP SERPL-CCNC: 127 U/L (ref 30–99)
ALP SERPL-CCNC: 132 U/L (ref 30–99)
ALP SERPL-CCNC: 150 U/L (ref 30–99)
ALP SERPL-CCNC: 170 U/L (ref 30–99)
ALT SERPL-CCNC: 12 U/L (ref 2–50)
ALT SERPL-CCNC: 16 U/L (ref 2–50)
ALT SERPL-CCNC: 24 U/L (ref 2–50)
ALT SERPL-CCNC: 850 U/L (ref 2–50)
AMPHET UR QL SCN: NEGATIVE
ANION GAP SERPL CALC-SCNC: 12 MMOL/L (ref 7–16)
ANION GAP SERPL CALC-SCNC: 12 MMOL/L (ref 7–16)
ANION GAP SERPL CALC-SCNC: 28 MMOL/L (ref 7–16)
ANION GAP SERPL CALC-SCNC: 33 MMOL/L (ref 7–16)
ANION GAP SERPL CALC-SCNC: 38 MMOL/L (ref 7–16)
ANION GAP SERPL CALC-SCNC: 9 MMOL/L (ref 7–16)
ANISOCYTOSIS BLD QL SMEAR: ABNORMAL
APTT PPP: 21.4 SEC (ref 24.7–36)
AST SERPL-CCNC: 12 U/L (ref 12–45)
AST SERPL-CCNC: 15 U/L (ref 12–45)
AST SERPL-CCNC: 21 U/L (ref 12–45)
AST SERPL-CCNC: 3685 U/L (ref 12–45)
B-OH-BUTYR SERPL-MCNC: 0.85 MMOL/L (ref 0.02–0.27)
B-OH-BUTYR SERPL-MCNC: >8 MMOL/L (ref 0.02–0.27)
BACTERIA BLD CULT: NORMAL
BACTERIA BLD CULT: NORMAL
BARBITURATES UR QL SCN: NEGATIVE
BASE EXCESS BLDA CALC-SCNC: -13 MMOL/L (ref -4–3)
BASE EXCESS BLDA CALC-SCNC: -19 MMOL/L (ref -4–3)
BASE EXCESS BLDA CALC-SCNC: -19 MMOL/L (ref -4–3)
BASE EXCESS BLDA CALC-SCNC: -21 MMOL/L (ref -4–3)
BASE EXCESS BLDA CALC-SCNC: -23 MMOL/L (ref -4–3)
BASE EXCESS BLDA CALC-SCNC: -27 MMOL/L (ref -4–3)
BASOPHILS # BLD AUTO: 0 % (ref 0–1.8)
BASOPHILS # BLD AUTO: 0.5 % (ref 0–1.8)
BASOPHILS # BLD AUTO: 0.5 % (ref 0–1.8)
BASOPHILS # BLD AUTO: 0.6 % (ref 0–1.8)
BASOPHILS # BLD: 0 K/UL (ref 0–0.12)
BASOPHILS # BLD: 0.03 K/UL (ref 0–0.12)
BASOPHILS # BLD: 0.04 K/UL (ref 0–0.12)
BASOPHILS # BLD: 0.04 K/UL (ref 0–0.12)
BENZODIAZ UR QL SCN: NEGATIVE
BILIRUB SERPL-MCNC: 0.2 MG/DL (ref 0.1–1.5)
BILIRUB SERPL-MCNC: 0.3 MG/DL (ref 0.1–1.5)
BILIRUB SERPL-MCNC: 0.3 MG/DL (ref 0.1–1.5)
BILIRUB SERPL-MCNC: 0.7 MG/DL (ref 0.1–1.5)
BODY TEMPERATURE: ABNORMAL DEGREES
BUN SERPL-MCNC: 16 MG/DL (ref 8–22)
BUN SERPL-MCNC: 19 MG/DL (ref 8–22)
BUN SERPL-MCNC: 21 MG/DL (ref 8–22)
BUN SERPL-MCNC: 7 MG/DL (ref 8–22)
BUN SERPL-MCNC: 8 MG/DL (ref 8–22)
BZE UR QL SCN: NEGATIVE
CA-I BLD ISE-SCNC: 1.21 MMOL/L (ref 1.1–1.3)
CA-I BLD ISE-SCNC: 1.38 MMOL/L (ref 1.1–1.3)
CA-I BLD ISE-SCNC: 1.43 MMOL/L (ref 1.1–1.3)
CA-I BLD ISE-SCNC: 1.47 MMOL/L (ref 1.1–1.3)
CA-I BLD ISE-SCNC: 1.48 MMOL/L (ref 1.1–1.3)
CALCIUM SERPL-MCNC: 10.1 MG/DL (ref 8.5–10.5)
CALCIUM SERPL-MCNC: 11.4 MG/DL (ref 8.5–10.5)
CALCIUM SERPL-MCNC: 7.7 MG/DL (ref 8.5–10.5)
CALCIUM SERPL-MCNC: 7.8 MG/DL (ref 8.5–10.5)
CALCIUM SERPL-MCNC: 8 MG/DL (ref 8.5–10.5)
CALCIUM SERPL-MCNC: 8.1 MG/DL (ref 8.5–10.5)
CALCIUM SERPL-MCNC: 8.4 MG/DL (ref 8.5–10.5)
CALCIUM SERPL-MCNC: 8.5 MG/DL (ref 8.5–10.5)
CANNABINOIDS UR QL SCN: NEGATIVE
CFT BLD TEG: 6.4 MIN (ref 5–10)
CHLORIDE SERPL-SCNC: 103 MMOL/L (ref 96–112)
CHLORIDE SERPL-SCNC: 104 MMOL/L (ref 96–112)
CHLORIDE SERPL-SCNC: 104 MMOL/L (ref 96–112)
CHLORIDE SERPL-SCNC: 105 MMOL/L (ref 96–112)
CHLORIDE SERPL-SCNC: 106 MMOL/L (ref 96–112)
CHLORIDE SERPL-SCNC: 112 MMOL/L (ref 96–112)
CHLORIDE SERPL-SCNC: 99 MMOL/L (ref 96–112)
CHLORIDE SERPL-SCNC: 99 MMOL/L (ref 96–112)
CHOLEST SERPL-MCNC: 91 MG/DL (ref 100–199)
CLOT ANGLE BLD TEG: 69.8 DEGREES (ref 53–72)
CLOT LYSIS 30M P MA LENFR BLD TEG: 0 % (ref 0–8)
CO2 BLDA-SCNC: 10 MMOL/L (ref 20–33)
CO2 BLDA-SCNC: 12 MMOL/L (ref 20–33)
CO2 BLDA-SCNC: 13 MMOL/L (ref 20–33)
CO2 BLDA-SCNC: 14 MMOL/L (ref 20–33)
CO2 BLDA-SCNC: 17 MMOL/L (ref 20–33)
CO2 BLDA-SCNC: <10 MMOL/L (ref 20–33)
CO2 SERPL-SCNC: 10 MMOL/L (ref 20–33)
CO2 SERPL-SCNC: 18 MMOL/L (ref 20–33)
CO2 SERPL-SCNC: 22 MMOL/L (ref 20–33)
CO2 SERPL-SCNC: 22 MMOL/L (ref 20–33)
CO2 SERPL-SCNC: 23 MMOL/L (ref 20–33)
CO2 SERPL-SCNC: 24 MMOL/L (ref 20–33)
CO2 SERPL-SCNC: 3 MMOL/L (ref 20–33)
CO2 SERPL-SCNC: 6 MMOL/L (ref 20–33)
CORTIS SERPL-MCNC: 30.2 UG/DL (ref 0–23)
COVID ORDER STATUS COVID19: NORMAL
CREAT SERPL-MCNC: 0.31 MG/DL (ref 0.5–1.4)
CREAT SERPL-MCNC: 0.31 MG/DL (ref 0.5–1.4)
CREAT SERPL-MCNC: 0.34 MG/DL (ref 0.5–1.4)
CREAT SERPL-MCNC: 0.35 MG/DL (ref 0.5–1.4)
CREAT SERPL-MCNC: 0.37 MG/DL (ref 0.5–1.4)
CREAT SERPL-MCNC: 0.88 MG/DL (ref 0.5–1.4)
CREAT SERPL-MCNC: 1.01 MG/DL (ref 0.5–1.4)
CREAT SERPL-MCNC: 1.05 MG/DL (ref 0.5–1.4)
CT.EXTRINSIC BLD ROTEM: 1.5 MIN (ref 1–3)
EKG IMPRESSION: NORMAL
EOSINOPHIL # BLD AUTO: 0.05 K/UL (ref 0–0.51)
EOSINOPHIL # BLD AUTO: 0.09 K/UL (ref 0–0.51)
EOSINOPHIL # BLD AUTO: 0.18 K/UL (ref 0–0.51)
EOSINOPHIL # BLD AUTO: 0.21 K/UL (ref 0–0.51)
EOSINOPHIL NFR BLD: 0.6 % (ref 0–6.9)
EOSINOPHIL NFR BLD: 0.9 % (ref 0–6.9)
EOSINOPHIL NFR BLD: 1.4 % (ref 0–6.9)
EOSINOPHIL NFR BLD: 2.6 % (ref 0–6.9)
ERYTHROCYTE [DISTWIDTH] IN BLOOD BY AUTOMATED COUNT: 39.2 FL (ref 35.9–50)
ERYTHROCYTE [DISTWIDTH] IN BLOOD BY AUTOMATED COUNT: 39.9 FL (ref 35.9–50)
ERYTHROCYTE [DISTWIDTH] IN BLOOD BY AUTOMATED COUNT: 47.8 FL (ref 35.9–50)
ERYTHROCYTE [DISTWIDTH] IN BLOOD BY AUTOMATED COUNT: 51.3 FL (ref 35.9–50)
EST. AVERAGE GLUCOSE BLD GHB EST-MCNC: 335 MG/DL
FIBRINOGEN PPP-MCNC: 370 MG/DL (ref 215–460)
FLUAV RNA SPEC QL NAA+PROBE: NEGATIVE
FLUBV RNA SPEC QL NAA+PROBE: NEGATIVE
GLOBULIN SER CALC-MCNC: 2.2 G/DL (ref 1.9–3.5)
GLOBULIN SER CALC-MCNC: 2.3 G/DL (ref 1.9–3.5)
GLOBULIN SER CALC-MCNC: 2.4 G/DL (ref 1.9–3.5)
GLOBULIN SER CALC-MCNC: 2.8 G/DL (ref 1.9–3.5)
GLUCOSE BLD-MCNC: 141 MG/DL (ref 65–99)
GLUCOSE BLD-MCNC: 192 MG/DL (ref 65–99)
GLUCOSE BLD-MCNC: 205 MG/DL (ref 65–99)
GLUCOSE BLD-MCNC: 219 MG/DL (ref 65–99)
GLUCOSE BLD-MCNC: 243 MG/DL (ref 65–99)
GLUCOSE BLD-MCNC: 313 MG/DL (ref 65–99)
GLUCOSE BLD-MCNC: 442 MG/DL (ref 65–99)
GLUCOSE BLD-MCNC: 493 MG/DL (ref 65–99)
GLUCOSE BLD-MCNC: 517 MG/DL (ref 65–99)
GLUCOSE BLD-MCNC: >600 MG/DL (ref 65–99)
GLUCOSE SERPL-MCNC: 200 MG/DL (ref 65–99)
GLUCOSE SERPL-MCNC: 217 MG/DL (ref 65–99)
GLUCOSE SERPL-MCNC: 220 MG/DL (ref 65–99)
GLUCOSE SERPL-MCNC: 222 MG/DL (ref 65–99)
GLUCOSE SERPL-MCNC: 329 MG/DL (ref 65–99)
GLUCOSE SERPL-MCNC: 610 MG/DL (ref 65–99)
GLUCOSE SERPL-MCNC: 721 MG/DL (ref 65–99)
GLUCOSE SERPL-MCNC: 724 MG/DL (ref 65–99)
GLUCOSE SERPL-MCNC: 726 MG/DL (ref 65–99)
GLUCOSE SERPL-MCNC: 763 MG/DL (ref 65–99)
HBA1C MFR BLD: 13.3 % (ref 0–5.6)
HCG SERPL QL: NEGATIVE
HCG SERPL QL: NEGATIVE
HCO3 BLDA-SCNC: 10.6 MMOL/L (ref 17–25)
HCO3 BLDA-SCNC: 12.1 MMOL/L (ref 17–25)
HCO3 BLDA-SCNC: 12.5 MMOL/L (ref 17–25)
HCO3 BLDA-SCNC: 15.6 MMOL/L (ref 17–25)
HCO3 BLDA-SCNC: 6.5 MMOL/L (ref 17–25)
HCO3 BLDA-SCNC: 9.1 MMOL/L (ref 17–25)
HCT VFR BLD AUTO: 31 % (ref 37–47)
HCT VFR BLD AUTO: 38 % (ref 37–47)
HCT VFR BLD AUTO: 40.1 % (ref 37–47)
HCT VFR BLD AUTO: 47.3 % (ref 37–47)
HCT VFR BLD CALC: 39 % (ref 37–47)
HCT VFR BLD CALC: 40 % (ref 37–47)
HCT VFR BLD CALC: 44 % (ref 37–47)
HCT VFR BLD CALC: 45 % (ref 37–47)
HCT VFR BLD CALC: 45 % (ref 37–47)
HDLC SERPL-MCNC: 38 MG/DL
HGB BLD-MCNC: 10.4 G/DL (ref 12–16)
HGB BLD-MCNC: 12.7 G/DL (ref 12–16)
HGB BLD-MCNC: 13 G/DL (ref 12–16)
HGB BLD-MCNC: 13.3 G/DL (ref 12–16)
HGB BLD-MCNC: 13.6 G/DL (ref 12–16)
HGB BLD-MCNC: 14.7 G/DL (ref 12–16)
HGB BLD-MCNC: 15 G/DL (ref 12–16)
HGB BLD-MCNC: 15.3 G/DL (ref 12–16)
HGB BLD-MCNC: 15.3 G/DL (ref 12–16)
HOROWITZ INDEX BLDA+IHG-RTO: 103 MM[HG]
HOROWITZ INDEX BLDA+IHG-RTO: 163 MM[HG]
HOROWITZ INDEX BLDA+IHG-RTO: 41 MM[HG]
HOROWITZ INDEX BLDA+IHG-RTO: 42 MM[HG]
HOROWITZ INDEX BLDA+IHG-RTO: 56 MM[HG]
HOROWITZ INDEX BLDA+IHG-RTO: 58 MM[HG]
IMM GRANULOCYTES # BLD AUTO: 0.02 K/UL (ref 0–0.11)
IMM GRANULOCYTES # BLD AUTO: 0.03 K/UL (ref 0–0.11)
IMM GRANULOCYTES # BLD AUTO: 0.04 K/UL (ref 0–0.11)
IMM GRANULOCYTES NFR BLD AUTO: 0.3 % (ref 0–0.9)
IMM GRANULOCYTES NFR BLD AUTO: 0.5 % (ref 0–0.9)
IMM GRANULOCYTES NFR BLD AUTO: 0.5 % (ref 0–0.9)
INR PPP: 1.24 (ref 0.87–1.13)
INST. QUALIFIED PATIENT IIQPT: YES
LACTATE BLD-SCNC: 4.3 MMOL/L (ref 0.5–2)
LDLC SERPL CALC-MCNC: 20 MG/DL
LIPASE SERPL-CCNC: 10 U/L (ref 11–82)
LIPASE SERPL-CCNC: 13 U/L (ref 11–82)
LIPASE SERPL-CCNC: 14 U/L (ref 11–82)
LYMPHOCYTES # BLD AUTO: 1.69 K/UL (ref 1–4.8)
LYMPHOCYTES # BLD AUTO: 1.91 K/UL (ref 1–4.8)
LYMPHOCYTES # BLD AUTO: 2.2 K/UL (ref 1–4.8)
LYMPHOCYTES # BLD AUTO: 4.86 K/UL (ref 1–4.8)
LYMPHOCYTES NFR BLD: 20.7 % (ref 22–41)
LYMPHOCYTES NFR BLD: 24.8 % (ref 22–41)
LYMPHOCYTES NFR BLD: 26.5 % (ref 22–41)
LYMPHOCYTES NFR BLD: 30.2 % (ref 22–41)
MAGNESIUM SERPL-MCNC: 1.7 MG/DL (ref 1.5–2.5)
MAGNESIUM SERPL-MCNC: 1.7 MG/DL (ref 1.5–2.5)
MAGNESIUM SERPL-MCNC: 1.8 MG/DL (ref 1.5–2.5)
MAGNESIUM SERPL-MCNC: 1.8 MG/DL (ref 1.5–2.5)
MANUAL DIFF BLD: NORMAL
MCF BLD TEG: 74.8 MM (ref 50–70)
MCH RBC QN AUTO: 27.7 PG (ref 27–33)
MCH RBC QN AUTO: 27.8 PG (ref 27–33)
MCH RBC QN AUTO: 28.4 PG (ref 27–33)
MCH RBC QN AUTO: 28.4 PG (ref 27–33)
MCHC RBC AUTO-ENTMCNC: 31.1 G/DL (ref 33.6–35)
MCHC RBC AUTO-ENTMCNC: 32.4 G/DL (ref 33.6–35)
MCHC RBC AUTO-ENTMCNC: 33.4 G/DL (ref 33.6–35)
MCHC RBC AUTO-ENTMCNC: 33.5 G/DL (ref 33.6–35)
MCV RBC AUTO: 82.9 FL (ref 81.4–97.8)
MCV RBC AUTO: 83 FL (ref 81.4–97.8)
MCV RBC AUTO: 87.7 FL (ref 81.4–97.8)
MCV RBC AUTO: 91.3 FL (ref 81.4–97.8)
METAMYELOCYTES NFR BLD MANUAL: 0.9 %
METHADONE UR QL SCN: NEGATIVE
MICROCYTES BLD QL SMEAR: ABNORMAL
MONOCYTES # BLD AUTO: 0.4 K/UL (ref 0–0.85)
MONOCYTES # BLD AUTO: 0.43 K/UL (ref 0–0.85)
MONOCYTES # BLD AUTO: 0.63 K/UL (ref 0–0.85)
MONOCYTES # BLD AUTO: 0.76 K/UL (ref 0–0.85)
MONOCYTES NFR BLD AUTO: 1.7 % (ref 0–13.4)
MONOCYTES NFR BLD AUTO: 12 % (ref 0–13.4)
MONOCYTES NFR BLD AUTO: 5.2 % (ref 0–13.4)
MONOCYTES NFR BLD AUTO: 9.2 % (ref 0–13.4)
MORPHOLOGY BLD-IMP: NORMAL
MYELOCYTES NFR BLD MANUAL: 1.7 %
NEUTROPHILS # BLD AUTO: 17.41 K/UL (ref 2–7.15)
NEUTROPHILS # BLD AUTO: 3.51 K/UL (ref 2–7.15)
NEUTROPHILS # BLD AUTO: 4.26 K/UL (ref 2–7.15)
NEUTROPHILS # BLD AUTO: 5.53 K/UL (ref 2–7.15)
NEUTROPHILS NFR BLD: 55.4 % (ref 44–72)
NEUTROPHILS NFR BLD: 62.5 % (ref 44–72)
NEUTROPHILS NFR BLD: 65.5 % (ref 44–72)
NEUTROPHILS NFR BLD: 66.7 % (ref 44–72)
NEUTS BAND NFR BLD MANUAL: 8.6 % (ref 0–10)
NRBC # BLD AUTO: 0 K/UL
NRBC # BLD AUTO: 0.06 K/UL
NRBC BLD-RTO: 0 /100 WBC
NRBC BLD-RTO: 0.3 /100 WBC
O2/TOTAL GAS SETTING VFR VENT: 100 %
O2/TOTAL GAS SETTING VFR VENT: 70 %
OPIATES UR QL SCN: NEGATIVE
OSMOLALITY SERPL: 331 MOSM/KG H2O (ref 278–298)
OXYCODONE UR QL SCN: NEGATIVE
PA AA BLD-ACNC: 6.1 %
PA ADP BLD-ACNC: 14.2 %
PCO2 BLDA: 36.7 MMHG (ref 26–37)
PCO2 BLDA: 41 MMHG (ref 26–37)
PCO2 BLDA: 45.9 MMHG (ref 26–37)
PCO2 BLDA: 46.8 MMHG (ref 26–37)
PCO2 BLDA: 46.8 MMHG (ref 26–37)
PCO2 BLDA: 53.2 MMHG (ref 26–37)
PCO2 TEMP ADJ BLDA: 33.9 MMHG (ref 26–37)
PCO2 TEMP ADJ BLDA: 38.4 MMHG (ref 26–37)
PCO2 TEMP ADJ BLDA: 46.2 MMHG (ref 26–37)
PCO2 TEMP ADJ BLDA: 47.7 MMHG (ref 26–37)
PCO2 TEMP ADJ BLDA: 50 MMHG (ref 26–37)
PCO2 TEMP ADJ BLDA: 53.7 MMHG (ref 26–37)
PCP UR QL SCN: NEGATIVE
PH BLDA: 6.86 [PH] (ref 7.4–7.5)
PH BLDA: 6.96 [PH] (ref 7.4–7.5)
PH BLDA: 6.96 [PH] (ref 7.4–7.5)
PH BLDA: 6.98 [PH] (ref 7.4–7.5)
PH BLDA: 7.02 [PH] (ref 7.4–7.5)
PH BLDA: 7.14 [PH] (ref 7.4–7.5)
PH TEMP ADJ BLDA: 6.88 [PH] (ref 7.4–7.5)
PH TEMP ADJ BLDA: 6.97 [PH] (ref 7.4–7.5)
PH TEMP ADJ BLDA: 6.97 [PH] (ref 7.4–7.5)
PH TEMP ADJ BLDA: 6.98 [PH] (ref 7.4–7.5)
PH TEMP ADJ BLDA: 7 [PH] (ref 7.4–7.5)
PH TEMP ADJ BLDA: 7.13 [PH] (ref 7.4–7.5)
PHOSPHATE SERPL-MCNC: 1.7 MG/DL (ref 2.5–4.5)
PHOSPHATE SERPL-MCNC: 2.4 MG/DL (ref 2.5–4.5)
PHOSPHATE SERPL-MCNC: 2.6 MG/DL (ref 2.5–4.5)
PHOSPHATE SERPL-MCNC: 3.6 MG/DL (ref 2.5–4.5)
PHOSPHATE SERPL-MCNC: 4.2 MG/DL (ref 2.5–4.5)
PHOSPHATE SERPL-MCNC: 6.7 MG/DL (ref 2.5–4.5)
PHOSPHATE SERPL-MCNC: 7.2 MG/DL (ref 2.5–4.5)
PLATELET # BLD AUTO: 200 K/UL (ref 164–446)
PLATELET # BLD AUTO: 220 K/UL (ref 164–446)
PLATELET # BLD AUTO: 284 K/UL (ref 164–446)
PLATELET # BLD AUTO: 508 K/UL (ref 164–446)
PLATELET BLD QL SMEAR: NORMAL
PMV BLD AUTO: 10.3 FL (ref 9–12.9)
PMV BLD AUTO: 10.6 FL (ref 9–12.9)
PMV BLD AUTO: 9.8 FL (ref 9–12.9)
PMV BLD AUTO: 9.9 FL (ref 9–12.9)
PO2 BLDA: 163 MMHG (ref 64–87)
PO2 BLDA: 41 MMHG (ref 64–87)
PO2 BLDA: 42 MMHG (ref 64–87)
PO2 BLDA: 56 MMHG (ref 64–87)
PO2 BLDA: 58 MMHG (ref 64–87)
PO2 BLDA: 72 MMHG (ref 64–87)
PO2 TEMP ADJ BLDA: 153 MMHG (ref 64–87)
PO2 TEMP ADJ BLDA: 44 MMHG (ref 64–87)
PO2 TEMP ADJ BLDA: 46 MMHG (ref 64–87)
PO2 TEMP ADJ BLDA: 57 MMHG (ref 64–87)
PO2 TEMP ADJ BLDA: 57 MMHG (ref 64–87)
PO2 TEMP ADJ BLDA: 65 MMHG (ref 64–87)
POLYCHROMASIA BLD QL SMEAR: NORMAL
POTASSIUM BLD-SCNC: 3.7 MMOL/L (ref 3.6–5.5)
POTASSIUM BLD-SCNC: 3.8 MMOL/L (ref 3.6–5.5)
POTASSIUM BLD-SCNC: 4 MMOL/L (ref 3.6–5.5)
POTASSIUM BLD-SCNC: 4.6 MMOL/L (ref 3.6–5.5)
POTASSIUM BLD-SCNC: 4.7 MMOL/L (ref 3.6–5.5)
POTASSIUM SERPL-SCNC: 3.6 MMOL/L (ref 3.6–5.5)
POTASSIUM SERPL-SCNC: 3.7 MMOL/L (ref 3.6–5.5)
POTASSIUM SERPL-SCNC: 4 MMOL/L (ref 3.6–5.5)
POTASSIUM SERPL-SCNC: 4.1 MMOL/L (ref 3.6–5.5)
POTASSIUM SERPL-SCNC: 4.5 MMOL/L (ref 3.6–5.5)
POTASSIUM SERPL-SCNC: 4.6 MMOL/L (ref 3.6–5.5)
POTASSIUM SERPL-SCNC: 4.8 MMOL/L (ref 3.6–5.5)
POTASSIUM SERPL-SCNC: 5 MMOL/L (ref 3.6–5.5)
PROPOXYPH UR QL SCN: NEGATIVE
PROT SERPL-MCNC: 4.9 G/DL (ref 6–8.2)
PROT SERPL-MCNC: 5.3 G/DL (ref 6–8.2)
PROT SERPL-MCNC: 6.3 G/DL (ref 6–8.2)
PROT SERPL-MCNC: 6.4 G/DL (ref 6–8.2)
PROTHROMBIN TIME: 15.9 SEC (ref 12–14.6)
RBC # BLD AUTO: 3.74 M/UL (ref 4.2–5.4)
RBC # BLD AUTO: 4.57 M/UL (ref 4.2–5.4)
RBC # BLD AUTO: 4.58 M/UL (ref 4.2–5.4)
RBC # BLD AUTO: 5.18 M/UL (ref 4.2–5.4)
RBC BLD AUTO: PRESENT
S PYO DNA SPEC NAA+PROBE: NOT DETECTED
SAO2 % BLDA: 53 % (ref 93–99)
SAO2 % BLDA: 62 % (ref 93–99)
SAO2 % BLDA: 70 % (ref 93–99)
SAO2 % BLDA: 71 % (ref 93–99)
SAO2 % BLDA: 82 % (ref 93–99)
SAO2 % BLDA: 97 % (ref 93–99)
SARS-COV-2 RNA RESP QL NAA+PROBE: NOTDETECTED
SIGNIFICANT IND 70042: NORMAL
SIGNIFICANT IND 70042: NORMAL
SITE SITE: NORMAL
SITE SITE: NORMAL
SODIUM BLD-SCNC: 146 MMOL/L (ref 135–145)
SODIUM BLD-SCNC: 149 MMOL/L (ref 135–145)
SODIUM BLD-SCNC: 150 MMOL/L (ref 135–145)
SODIUM BLD-SCNC: 150 MMOL/L (ref 135–145)
SODIUM BLD-SCNC: 151 MMOL/L (ref 135–145)
SODIUM SERPL-SCNC: 133 MMOL/L (ref 135–145)
SODIUM SERPL-SCNC: 134 MMOL/L (ref 135–145)
SODIUM SERPL-SCNC: 136 MMOL/L (ref 135–145)
SODIUM SERPL-SCNC: 136 MMOL/L (ref 135–145)
SODIUM SERPL-SCNC: 138 MMOL/L (ref 135–145)
SODIUM SERPL-SCNC: 139 MMOL/L (ref 135–145)
SODIUM SERPL-SCNC: 144 MMOL/L (ref 135–145)
SODIUM SERPL-SCNC: 150 MMOL/L (ref 135–145)
SOURCE SOURCE: NORMAL
SOURCE SOURCE: NORMAL
SPECIMEN DRAWN FROM PATIENT: ABNORMAL
SPECIMEN SOURCE: NORMAL
TEG ALGORITHM TGALG: ABNORMAL
TRIGL SERPL-MCNC: 164 MG/DL (ref 0–149)
TROPONIN T SERPL-MCNC: 45 NG/L (ref 6–19)
WBC # BLD AUTO: 23.5 K/UL (ref 4.8–10.8)
WBC # BLD AUTO: 6.3 K/UL (ref 4.8–10.8)
WBC # BLD AUTO: 6.8 K/UL (ref 4.8–10.8)
WBC # BLD AUTO: 8.3 K/UL (ref 4.8–10.8)

## 2020-01-01 PROCEDURE — 99292 CRITICAL CARE ADDL 30 MIN: CPT | Mod: 25 | Performed by: INTERNAL MEDICINE

## 2020-01-01 PROCEDURE — 95822 EEG COMA OR SLEEP ONLY: CPT | Mod: 26 | Performed by: PSYCHIATRY & NEUROLOGY

## 2020-01-01 PROCEDURE — 700105 HCHG RX REV CODE 258: Performed by: INTERNAL MEDICINE

## 2020-01-01 PROCEDURE — C9803 HOPD COVID-19 SPEC COLLECT: HCPCS | Performed by: INTERNAL MEDICINE

## 2020-01-01 PROCEDURE — 80048 BASIC METABOLIC PNL TOTAL CA: CPT

## 2020-01-01 PROCEDURE — 96375 TX/PRO/DX INJ NEW DRUG ADDON: CPT

## 2020-01-01 PROCEDURE — 02HV33Z INSERTION OF INFUSION DEVICE INTO SUPERIOR VENA CAVA, PERCUTANEOUS APPROACH: ICD-10-PCS | Performed by: INTERNAL MEDICINE

## 2020-01-01 PROCEDURE — 36415 COLL VENOUS BLD VENIPUNCTURE: CPT

## 2020-01-01 PROCEDURE — 96365 THER/PROPH/DIAG IV INF INIT: CPT

## 2020-01-01 PROCEDURE — 87040 BLOOD CULTURE FOR BACTERIA: CPT

## 2020-01-01 PROCEDURE — 700111 HCHG RX REV CODE 636 W/ 250 OVERRIDE (IP): Performed by: STUDENT IN AN ORGANIZED HEALTH CARE EDUCATION/TRAINING PROGRAM

## 2020-01-01 PROCEDURE — 93005 ELECTROCARDIOGRAM TRACING: CPT | Performed by: INTERNAL MEDICINE

## 2020-01-01 PROCEDURE — 700111 HCHG RX REV CODE 636 W/ 250 OVERRIDE (IP)

## 2020-01-01 PROCEDURE — 82962 GLUCOSE BLOOD TEST: CPT | Mod: 91

## 2020-01-01 PROCEDURE — 92950 HEART/LUNG RESUSCITATION CPR: CPT

## 2020-01-01 PROCEDURE — 85347 COAGULATION TIME ACTIVATED: CPT

## 2020-01-01 PROCEDURE — 83735 ASSAY OF MAGNESIUM: CPT

## 2020-01-01 PROCEDURE — 770020 HCHG ROOM/CARE - TELE (206)

## 2020-01-01 PROCEDURE — 82947 ASSAY GLUCOSE BLOOD QUANT: CPT

## 2020-01-01 PROCEDURE — 84100 ASSAY OF PHOSPHORUS: CPT

## 2020-01-01 PROCEDURE — 37799 UNLISTED PX VASCULAR SURGERY: CPT

## 2020-01-01 PROCEDURE — 80053 COMPREHEN METABOLIC PANEL: CPT

## 2020-01-01 PROCEDURE — 94770 HCHG CO2 EXPIRED GAS DETERMINATION: CPT

## 2020-01-01 PROCEDURE — 31500 INSERT EMERGENCY AIRWAY: CPT

## 2020-01-01 PROCEDURE — 700105 HCHG RX REV CODE 258: Performed by: EMERGENCY MEDICINE

## 2020-01-01 PROCEDURE — 700111 HCHG RX REV CODE 636 W/ 250 OVERRIDE (IP): Performed by: EMERGENCY MEDICINE

## 2020-01-01 PROCEDURE — 87502 INFLUENZA DNA AMP PROBE: CPT

## 2020-01-01 PROCEDURE — 85384 FIBRINOGEN ACTIVITY: CPT

## 2020-01-01 PROCEDURE — 71045 X-RAY EXAM CHEST 1 VIEW: CPT

## 2020-01-01 PROCEDURE — 36620 INSERTION CATHETER ARTERY: CPT | Performed by: INTERNAL MEDICINE

## 2020-01-01 PROCEDURE — 700102 HCHG RX REV CODE 250 W/ 637 OVERRIDE(OP): Performed by: STUDENT IN AN ORGANIZED HEALTH CARE EDUCATION/TRAINING PROGRAM

## 2020-01-01 PROCEDURE — 83605 ASSAY OF LACTIC ACID: CPT

## 2020-01-01 PROCEDURE — 700105 HCHG RX REV CODE 258: Performed by: STUDENT IN AN ORGANIZED HEALTH CARE EDUCATION/TRAINING PROGRAM

## 2020-01-01 PROCEDURE — 83930 ASSAY OF BLOOD OSMOLALITY: CPT

## 2020-01-01 PROCEDURE — 84484 ASSAY OF TROPONIN QUANT: CPT

## 2020-01-01 PROCEDURE — 93010 ELECTROCARDIOGRAM REPORT: CPT | Performed by: INTERNAL MEDICINE

## 2020-01-01 PROCEDURE — 70450 CT HEAD/BRAIN W/O DYE: CPT

## 2020-01-01 PROCEDURE — 93005 ELECTROCARDIOGRAM TRACING: CPT

## 2020-01-01 PROCEDURE — 92950 HEART/LUNG RESUSCITATION CPR: CPT | Performed by: INTERNAL MEDICINE

## 2020-01-01 PROCEDURE — 96374 THER/PROPH/DIAG INJ IV PUSH: CPT

## 2020-01-01 PROCEDURE — 96372 THER/PROPH/DIAG INJ SC/IM: CPT

## 2020-01-01 PROCEDURE — 84295 ASSAY OF SERUM SODIUM: CPT | Mod: 91

## 2020-01-01 PROCEDURE — 87651 STREP A DNA AMP PROBE: CPT

## 2020-01-01 PROCEDURE — 82803 BLOOD GASES ANY COMBINATION: CPT | Mod: 91

## 2020-01-01 PROCEDURE — 82010 KETONE BODYS QUAN: CPT

## 2020-01-01 PROCEDURE — 85576 BLOOD PLATELET AGGREGATION: CPT | Mod: 91

## 2020-01-01 PROCEDURE — 700101 HCHG RX REV CODE 250: Performed by: STUDENT IN AN ORGANIZED HEALTH CARE EDUCATION/TRAINING PROGRAM

## 2020-01-01 PROCEDURE — 83690 ASSAY OF LIPASE: CPT

## 2020-01-01 PROCEDURE — U0004 COV-19 TEST NON-CDC HGH THRU: HCPCS

## 2020-01-01 PROCEDURE — 93308 TTE F-UP OR LMTD: CPT

## 2020-01-01 PROCEDURE — A9270 NON-COVERED ITEM OR SERVICE: HCPCS | Performed by: STUDENT IN AN ORGANIZED HEALTH CARE EDUCATION/TRAINING PROGRAM

## 2020-01-01 PROCEDURE — 85025 COMPLETE CBC W/AUTO DIFF WBC: CPT

## 2020-01-01 PROCEDURE — 36600 WITHDRAWAL OF ARTERIAL BLOOD: CPT

## 2020-01-01 PROCEDURE — 85027 COMPLETE CBC AUTOMATED: CPT

## 2020-01-01 PROCEDURE — 700111 HCHG RX REV CODE 636 W/ 250 OVERRIDE (IP): Performed by: INTERNAL MEDICINE

## 2020-01-01 PROCEDURE — 700105 HCHG RX REV CODE 258: Performed by: HOSPITALIST

## 2020-01-01 PROCEDURE — 700117 HCHG RX CONTRAST REV CODE 255: Performed by: INTERNAL MEDICINE

## 2020-01-01 PROCEDURE — 82330 ASSAY OF CALCIUM: CPT | Mod: 91

## 2020-01-01 PROCEDURE — 99223 1ST HOSP IP/OBS HIGH 75: CPT | Performed by: HOSPITALIST

## 2020-01-01 PROCEDURE — 83036 HEMOGLOBIN GLYCOSYLATED A1C: CPT

## 2020-01-01 PROCEDURE — 36620 INSERTION CATHETER ARTERY: CPT

## 2020-01-01 PROCEDURE — 700102 HCHG RX REV CODE 250 W/ 637 OVERRIDE(OP): Performed by: INTERNAL MEDICINE

## 2020-01-01 PROCEDURE — 85014 HEMATOCRIT: CPT | Mod: 91

## 2020-01-01 PROCEDURE — B548ZZA ULTRASONOGRAPHY OF SUPERIOR VENA CAVA, GUIDANCE: ICD-10-PCS | Performed by: INTERNAL MEDICINE

## 2020-01-01 PROCEDURE — C1751 CATH, INF, PER/CENT/MIDLINE: HCPCS

## 2020-01-01 PROCEDURE — 700101 HCHG RX REV CODE 250: Performed by: INTERNAL MEDICINE

## 2020-01-01 PROCEDURE — 03HY32Z INSERTION OF MONITORING DEVICE INTO UPPER ARTERY, PERCUTANEOUS APPROACH: ICD-10-PCS | Performed by: INTERNAL MEDICINE

## 2020-01-01 PROCEDURE — 51798 US URINE CAPACITY MEASURE: CPT

## 2020-01-01 PROCEDURE — 36556 INSERT NON-TUNNEL CV CATH: CPT | Mod: LT | Performed by: INTERNAL MEDICINE

## 2020-01-01 PROCEDURE — 84703 CHORIONIC GONADOTROPIN ASSAY: CPT

## 2020-01-01 PROCEDURE — 5A1935Z RESPIRATORY VENTILATION, LESS THAN 24 CONSECUTIVE HOURS: ICD-10-PCS | Performed by: INTERNAL MEDICINE

## 2020-01-01 PROCEDURE — 85610 PROTHROMBIN TIME: CPT

## 2020-01-01 PROCEDURE — 84132 ASSAY OF SERUM POTASSIUM: CPT | Mod: 91

## 2020-01-01 PROCEDURE — 94002 VENT MGMT INPAT INIT DAY: CPT

## 2020-01-01 PROCEDURE — P9045 ALBUMIN (HUMAN), 5%, 250 ML: HCPCS | Performed by: INTERNAL MEDICINE

## 2020-01-01 PROCEDURE — 99291 CRITICAL CARE FIRST HOUR: CPT

## 2020-01-01 PROCEDURE — 4A10X4Z MONITORING OF CENTRAL NERVOUS ELECTRICAL ACTIVITY, EXTERNAL APPROACH: ICD-10-PCS | Performed by: PSYCHIATRY & NEUROLOGY

## 2020-01-01 PROCEDURE — 93308 TTE F-UP OR LMTD: CPT | Mod: 26 | Performed by: INTERNAL MEDICINE

## 2020-01-01 PROCEDURE — 31500 INSERT EMERGENCY AIRWAY: CPT | Performed by: INTERNAL MEDICINE

## 2020-01-01 PROCEDURE — 99291 CRITICAL CARE FIRST HOUR: CPT | Mod: 25 | Performed by: INTERNAL MEDICINE

## 2020-01-01 PROCEDURE — 99285 EMERGENCY DEPT VISIT HI MDM: CPT

## 2020-01-01 PROCEDURE — 700102 HCHG RX REV CODE 250 W/ 637 OVERRIDE(OP): Performed by: EMERGENCY MEDICINE

## 2020-01-01 PROCEDURE — 80307 DRUG TEST PRSMV CHEM ANLYZR: CPT

## 2020-01-01 PROCEDURE — 82533 TOTAL CORTISOL: CPT

## 2020-01-01 PROCEDURE — 85007 BL SMEAR W/DIFF WBC COUNT: CPT

## 2020-01-01 PROCEDURE — 96376 TX/PRO/DX INJ SAME DRUG ADON: CPT

## 2020-01-01 PROCEDURE — 95822 EEG COMA OR SLEEP ONLY: CPT | Performed by: PSYCHIATRY & NEUROLOGY

## 2020-01-01 PROCEDURE — 80061 LIPID PANEL: CPT

## 2020-01-01 PROCEDURE — 700111 HCHG RX REV CODE 636 W/ 250 OVERRIDE (IP): Performed by: HOSPITALIST

## 2020-01-01 PROCEDURE — 93005 ELECTROCARDIOGRAM TRACING: CPT | Performed by: EMERGENCY MEDICINE

## 2020-01-01 PROCEDURE — 84100 ASSAY OF PHOSPHORUS: CPT | Mod: 91

## 2020-01-01 PROCEDURE — 5A12012 PERFORMANCE OF CARDIAC OUTPUT, SINGLE, MANUAL: ICD-10-PCS | Performed by: INTERNAL MEDICINE

## 2020-01-01 PROCEDURE — 93005 ELECTROCARDIOGRAM TRACING: CPT | Performed by: STUDENT IN AN ORGANIZED HEALTH CARE EDUCATION/TRAINING PROGRAM

## 2020-01-01 PROCEDURE — 36556 INSERT NON-TUNNEL CV CATH: CPT

## 2020-01-01 PROCEDURE — 82962 GLUCOSE BLOOD TEST: CPT

## 2020-01-01 PROCEDURE — 770022 HCHG ROOM/CARE - ICU (200)

## 2020-01-01 PROCEDURE — 700102 HCHG RX REV CODE 250 W/ 637 OVERRIDE(OP): Performed by: HOSPITALIST

## 2020-01-01 PROCEDURE — 0BH17EZ INSERTION OF ENDOTRACHEAL AIRWAY INTO TRACHEA, VIA NATURAL OR ARTIFICIAL OPENING: ICD-10-PCS | Performed by: INTERNAL MEDICINE

## 2020-01-01 PROCEDURE — 71275 CT ANGIOGRAPHY CHEST: CPT

## 2020-01-01 PROCEDURE — 85730 THROMBOPLASTIN TIME PARTIAL: CPT

## 2020-01-01 PROCEDURE — 99152 MOD SED SAME PHYS/QHP 5/>YRS: CPT

## 2020-01-01 RX ORDER — VECURONIUM BROMIDE 1 MG/ML
10 INJECTION, POWDER, LYOPHILIZED, FOR SOLUTION INTRAVENOUS ONCE
Status: COMPLETED | OUTPATIENT
Start: 2020-01-01 | End: 2020-01-01

## 2020-01-01 RX ORDER — HYDROMORPHONE HYDROCHLORIDE 2 MG/ML
1.5 INJECTION, SOLUTION INTRAMUSCULAR; INTRAVENOUS; SUBCUTANEOUS
Status: DISCONTINUED | OUTPATIENT
Start: 2020-01-01 | End: 2020-01-01 | Stop reason: HOSPADM

## 2020-01-01 RX ORDER — AMOXICILLIN 250 MG
2 CAPSULE ORAL 2 TIMES DAILY
Status: DISCONTINUED | OUTPATIENT
Start: 2020-01-01 | End: 2020-01-01 | Stop reason: HOSPADM

## 2020-01-01 RX ORDER — PROCHLORPERAZINE EDISYLATE 5 MG/ML
10 INJECTION INTRAMUSCULAR; INTRAVENOUS ONCE
Status: DISCONTINUED | OUTPATIENT
Start: 2020-01-01 | End: 2020-01-01

## 2020-01-01 RX ORDER — AMOXICILLIN 250 MG
2 CAPSULE ORAL 2 TIMES DAILY
Status: DISCONTINUED | OUTPATIENT
Start: 2020-01-01 | End: 2020-01-01

## 2020-01-01 RX ORDER — ONDANSETRON 4 MG/1
4 TABLET, ORALLY DISINTEGRATING ORAL EVERY 4 HOURS PRN
Status: DISCONTINUED | OUTPATIENT
Start: 2020-01-01 | End: 2020-01-01 | Stop reason: HOSPADM

## 2020-01-01 RX ORDER — BISACODYL 10 MG
10 SUPPOSITORY, RECTAL RECTAL
Status: DISCONTINUED | OUTPATIENT
Start: 2020-01-01 | End: 2020-01-01 | Stop reason: HOSPADM

## 2020-01-01 RX ORDER — SODIUM CHLORIDE 9 MG/ML
2000 INJECTION, SOLUTION INTRAVENOUS CONTINUOUS
Status: DISCONTINUED | OUTPATIENT
Start: 2020-01-01 | End: 2020-01-01

## 2020-01-01 RX ORDER — CLONIDINE HYDROCHLORIDE 0.1 MG/1
0.1 TABLET ORAL EVERY 6 HOURS PRN
Status: DISCONTINUED | OUTPATIENT
Start: 2020-01-01 | End: 2020-01-01 | Stop reason: HOSPADM

## 2020-01-01 RX ORDER — ONDANSETRON 4 MG/1
4 TABLET, ORALLY DISINTEGRATING ORAL EVERY 4 HOURS PRN
Status: DISCONTINUED | OUTPATIENT
Start: 2020-01-01 | End: 2020-01-01

## 2020-01-01 RX ORDER — SODIUM CHLORIDE 9 MG/ML
1000 INJECTION, SOLUTION INTRAVENOUS ONCE
Status: COMPLETED | OUTPATIENT
Start: 2020-01-01 | End: 2020-01-01

## 2020-01-01 RX ORDER — ACETAMINOPHEN 325 MG/1
650 TABLET ORAL EVERY 6 HOURS PRN
Status: DISCONTINUED | OUTPATIENT
Start: 2020-01-01 | End: 2020-01-01 | Stop reason: HOSPADM

## 2020-01-01 RX ORDER — DEXTROSE AND SODIUM CHLORIDE 5; .9 G/100ML; G/100ML
INJECTION, SOLUTION INTRAVENOUS CONTINUOUS
Status: DISCONTINUED | OUTPATIENT
Start: 2020-01-01 | End: 2020-01-01 | Stop reason: HOSPADM

## 2020-01-01 RX ORDER — POTASSIUM CHLORIDE 7.45 MG/ML
10 INJECTION INTRAVENOUS ONCE
Status: COMPLETED | OUTPATIENT
Start: 2020-01-01 | End: 2020-01-01

## 2020-01-01 RX ORDER — HYDROMORPHONE HYDROCHLORIDE 1 MG/ML
0.75 INJECTION, SOLUTION INTRAMUSCULAR; INTRAVENOUS; SUBCUTANEOUS
Status: DISCONTINUED | OUTPATIENT
Start: 2020-01-01 | End: 2020-01-01 | Stop reason: HOSPADM

## 2020-01-01 RX ORDER — METOCLOPRAMIDE 10 MG/1
10 TABLET ORAL 4 TIMES DAILY
Status: DISCONTINUED | OUTPATIENT
Start: 2020-01-01 | End: 2020-01-01 | Stop reason: HOSPADM

## 2020-01-01 RX ORDER — MAGNESIUM SULFATE HEPTAHYDRATE 40 MG/ML
2 INJECTION, SOLUTION INTRAVENOUS
Status: COMPLETED | OUTPATIENT
Start: 2020-01-01 | End: 2020-01-01

## 2020-01-01 RX ORDER — INSULIN GLARGINE 100 [IU]/ML
0.2 INJECTION, SOLUTION SUBCUTANEOUS EVERY EVENING
Status: DISCONTINUED | OUTPATIENT
Start: 2020-01-01 | End: 2020-01-01

## 2020-01-01 RX ORDER — INSULIN GLARGINE 100 [IU]/ML
12 INJECTION, SOLUTION SUBCUTANEOUS EVERY EVENING
Status: DISCONTINUED | OUTPATIENT
Start: 2020-01-01 | End: 2020-01-01

## 2020-01-01 RX ORDER — DEXTROSE AND SODIUM CHLORIDE 10; .45 G/100ML; G/100ML
INJECTION, SOLUTION INTRAVENOUS CONTINUOUS
Status: DISCONTINUED | OUTPATIENT
Start: 2020-01-01 | End: 2020-01-01 | Stop reason: HOSPADM

## 2020-01-01 RX ORDER — FERROUS SULFATE 325(65) MG
325 TABLET ORAL DAILY
Status: DISCONTINUED | OUTPATIENT
Start: 2020-01-01 | End: 2020-01-01

## 2020-01-01 RX ORDER — AZITHROMYCIN 500 MG/1
500 INJECTION, POWDER, LYOPHILIZED, FOR SOLUTION INTRAVENOUS ONCE
Status: COMPLETED | OUTPATIENT
Start: 2020-01-01 | End: 2020-01-01

## 2020-01-01 RX ORDER — ONDANSETRON 2 MG/ML
4 INJECTION INTRAMUSCULAR; INTRAVENOUS EVERY 4 HOURS PRN
Status: DISCONTINUED | OUTPATIENT
Start: 2020-01-01 | End: 2020-01-01 | Stop reason: HOSPADM

## 2020-01-01 RX ORDER — ONDANSETRON 2 MG/ML
4 INJECTION INTRAMUSCULAR; INTRAVENOUS ONCE
Status: COMPLETED | OUTPATIENT
Start: 2020-01-01 | End: 2020-01-01

## 2020-01-01 RX ORDER — BISACODYL 10 MG
10 SUPPOSITORY, RECTAL RECTAL
Status: DISCONTINUED | OUTPATIENT
Start: 2020-01-01 | End: 2020-01-01

## 2020-01-01 RX ORDER — CIPROFLOXACIN AND DEXAMETHASONE 3; 1 MG/ML; MG/ML
4 SUSPENSION/ DROPS AURICULAR (OTIC) 2 TIMES DAILY
Status: DISCONTINUED | OUTPATIENT
Start: 2020-01-01 | End: 2020-01-01

## 2020-01-01 RX ORDER — ETOMIDATE 2 MG/ML
INJECTION INTRAVENOUS
Status: COMPLETED | OUTPATIENT
Start: 2020-01-01 | End: 2020-01-01

## 2020-01-01 RX ORDER — ACETAMINOPHEN 325 MG/1
650 TABLET ORAL EVERY 6 HOURS PRN
Status: DISCONTINUED | OUTPATIENT
Start: 2020-01-01 | End: 2020-01-01

## 2020-01-01 RX ORDER — SODIUM CHLORIDE 9 MG/ML
INJECTION, SOLUTION INTRAVENOUS CONTINUOUS
Status: DISCONTINUED | OUTPATIENT
Start: 2020-01-01 | End: 2020-01-01 | Stop reason: HOSPADM

## 2020-01-01 RX ORDER — SODIUM CHLORIDE 9 MG/ML
1000 INJECTION, SOLUTION INTRAVENOUS ONCE
Status: DISCONTINUED | OUTPATIENT
Start: 2020-01-01 | End: 2020-01-01

## 2020-01-01 RX ORDER — ONDANSETRON 4 MG/1
4 TABLET, FILM COATED ORAL EVERY 4 HOURS PRN
Qty: 20 TAB | Refills: 0 | Status: SHIPPED | OUTPATIENT
Start: 2020-01-01 | End: 2020-01-01

## 2020-01-01 RX ORDER — OMEPRAZOLE 20 MG/1
20 CAPSULE, DELAYED RELEASE ORAL DAILY
Status: DISCONTINUED | OUTPATIENT
Start: 2020-01-01 | End: 2020-01-01

## 2020-01-01 RX ORDER — HEPARIN SODIUM 5000 [USP'U]/ML
5000 INJECTION, SOLUTION INTRAVENOUS; SUBCUTANEOUS EVERY 8 HOURS
Status: DISCONTINUED | OUTPATIENT
Start: 2020-01-01 | End: 2020-01-01 | Stop reason: HOSPADM

## 2020-01-01 RX ORDER — SODIUM CHLORIDE, SODIUM LACTATE, POTASSIUM CHLORIDE, CALCIUM CHLORIDE 600; 310; 30; 20 MG/100ML; MG/100ML; MG/100ML; MG/100ML
INJECTION, SOLUTION INTRAVENOUS CONTINUOUS
Status: DISCONTINUED | OUTPATIENT
Start: 2020-01-01 | End: 2020-01-01 | Stop reason: HOSPADM

## 2020-01-01 RX ORDER — OMEPRAZOLE 20 MG/1
20 CAPSULE, DELAYED RELEASE ORAL DAILY
Status: DISCONTINUED | OUTPATIENT
Start: 2020-01-01 | End: 2020-01-01 | Stop reason: HOSPADM

## 2020-01-01 RX ORDER — CALCIUM CHLORIDE 100 MG/ML
1 INJECTION INTRAVENOUS; INTRAVENTRICULAR ONCE
Status: COMPLETED | OUTPATIENT
Start: 2020-01-01 | End: 2020-01-01

## 2020-01-01 RX ORDER — CIPROFLOXACIN AND DEXAMETHASONE 3; 1 MG/ML; MG/ML
4 SUSPENSION/ DROPS AURICULAR (OTIC) 2 TIMES DAILY
Qty: 1 BOTTLE | Refills: 0 | Status: SHIPPED
Start: 2020-01-01 | End: 2020-01-01

## 2020-01-01 RX ORDER — POTASSIUM CHLORIDE 20 MEQ/1
40 TABLET, EXTENDED RELEASE ORAL ONCE
Status: COMPLETED | OUTPATIENT
Start: 2020-01-01 | End: 2020-01-01

## 2020-01-01 RX ORDER — METOCLOPRAMIDE 10 MG/1
10 TABLET ORAL
Status: DISCONTINUED | OUTPATIENT
Start: 2020-01-01 | End: 2020-01-01 | Stop reason: HOSPADM

## 2020-01-01 RX ORDER — LORAZEPAM 2 MG/ML
INJECTION INTRAMUSCULAR
Status: COMPLETED | OUTPATIENT
Start: 2020-01-01 | End: 2020-01-01

## 2020-01-01 RX ORDER — IPRATROPIUM BROMIDE AND ALBUTEROL SULFATE 2.5; .5 MG/3ML; MG/3ML
3 SOLUTION RESPIRATORY (INHALATION)
Status: DISCONTINUED | OUTPATIENT
Start: 2020-01-01 | End: 2020-01-01 | Stop reason: HOSPADM

## 2020-01-01 RX ORDER — LORAZEPAM 2 MG/ML
INJECTION INTRAMUSCULAR
Status: DISCONTINUED
Start: 2020-01-01 | End: 2020-01-01 | Stop reason: HOSPADM

## 2020-01-01 RX ORDER — ALBUMIN, HUMAN INJ 5% 5 %
25 SOLUTION INTRAVENOUS ONCE
Status: COMPLETED | OUTPATIENT
Start: 2020-01-01 | End: 2020-01-01

## 2020-01-01 RX ORDER — EPINEPHRINE HCL IN 0.9 % NACL 4MG/250ML
0-10 PLASTIC BAG, INJECTION (ML) INTRAVENOUS CONTINUOUS
Status: DISCONTINUED | OUTPATIENT
Start: 2020-01-01 | End: 2020-01-01

## 2020-01-01 RX ORDER — MAGNESIUM SULFATE HEPTAHYDRATE 40 MG/ML
4 INJECTION, SOLUTION INTRAVENOUS
Status: COMPLETED | OUTPATIENT
Start: 2020-01-01 | End: 2020-01-01

## 2020-01-01 RX ORDER — METOCLOPRAMIDE 10 MG/1
10 TABLET ORAL 4 TIMES DAILY
Status: DISCONTINUED | OUTPATIENT
Start: 2020-01-01 | End: 2020-01-01

## 2020-01-01 RX ORDER — AZITHROMYCIN 250 MG/1
250 TABLET, FILM COATED ORAL DAILY
Status: DISCONTINUED | OUTPATIENT
Start: 2020-01-01 | End: 2020-01-01

## 2020-01-01 RX ORDER — POLYETHYLENE GLYCOL 3350 17 G/17G
1 POWDER, FOR SOLUTION ORAL
Status: DISCONTINUED | OUTPATIENT
Start: 2020-01-01 | End: 2020-01-01

## 2020-01-01 RX ORDER — INSULIN GLARGINE 100 [IU]/ML
30 INJECTION, SOLUTION SUBCUTANEOUS TWICE DAILY
Status: DISCONTINUED | OUTPATIENT
Start: 2020-01-01 | End: 2020-01-01 | Stop reason: HOSPADM

## 2020-01-01 RX ORDER — POLYETHYLENE GLYCOL 3350 17 G/17G
1 POWDER, FOR SOLUTION ORAL
Status: DISCONTINUED | OUTPATIENT
Start: 2020-01-01 | End: 2020-01-01 | Stop reason: HOSPADM

## 2020-01-01 RX ORDER — PHENYLEPHRINE HCL IN 0.9% NACL 0.5 MG/5ML
SYRINGE (ML) INTRAVENOUS
Status: COMPLETED
Start: 2020-01-01 | End: 2020-01-01

## 2020-01-01 RX ORDER — DOXYCYCLINE 100 MG/1
100 CAPSULE ORAL 2 TIMES DAILY
Qty: 12 CAP | Refills: 0 | Status: SHIPPED | OUTPATIENT
Start: 2020-01-01 | End: 2020-01-01

## 2020-01-01 RX ORDER — INSULIN GLARGINE 100 [IU]/ML
15 INJECTION, SOLUTION SUBCUTANEOUS EVERY EVENING
Status: DISCONTINUED | OUTPATIENT
Start: 2020-01-01 | End: 2020-01-01

## 2020-01-01 RX ORDER — SODIUM CHLORIDE 9 MG/ML
2000 INJECTION, SOLUTION INTRAVENOUS ONCE
Status: DISCONTINUED | OUTPATIENT
Start: 2020-01-01 | End: 2020-01-01

## 2020-01-01 RX ORDER — FERROUS SULFATE 325(65) MG
325 TABLET ORAL DAILY
Status: DISCONTINUED | OUTPATIENT
Start: 2020-01-01 | End: 2020-01-01 | Stop reason: HOSPADM

## 2020-01-01 RX ORDER — ASCORBIC ACID 500 MG
500 TABLET ORAL DAILY
Status: DISCONTINUED | OUTPATIENT
Start: 2020-01-01 | End: 2020-01-01 | Stop reason: HOSPADM

## 2020-01-01 RX ORDER — ROCURONIUM BROMIDE 10 MG/ML
INJECTION, SOLUTION INTRAVENOUS
Status: COMPLETED | OUTPATIENT
Start: 2020-01-01 | End: 2020-01-01

## 2020-01-01 RX ORDER — SODIUM CHLORIDE 9 MG/ML
2000 INJECTION, SOLUTION INTRAVENOUS ONCE
Status: COMPLETED | OUTPATIENT
Start: 2020-01-01 | End: 2020-01-01

## 2020-01-01 RX ORDER — SODIUM CHLORIDE, SODIUM LACTATE, POTASSIUM CHLORIDE, AND CALCIUM CHLORIDE .6; .31; .03; .02 G/100ML; G/100ML; G/100ML; G/100ML
2000 INJECTION, SOLUTION INTRAVENOUS ONCE
Status: COMPLETED | OUTPATIENT
Start: 2020-01-01 | End: 2020-01-01

## 2020-01-01 RX ORDER — FAMOTIDINE 20 MG/1
20 TABLET, FILM COATED ORAL EVERY 12 HOURS
Status: DISCONTINUED | OUTPATIENT
Start: 2020-01-01 | End: 2020-01-01 | Stop reason: HOSPADM

## 2020-01-01 RX ADMIN — SODIUM CHLORIDE, POTASSIUM CHLORIDE, SODIUM LACTATE AND CALCIUM CHLORIDE: 600; 310; 30; 20 INJECTION, SOLUTION INTRAVENOUS at 01:18

## 2020-01-01 RX ADMIN — SODIUM CHLORIDE 2000 ML: 9 INJECTION, SOLUTION INTRAVENOUS at 11:19

## 2020-01-01 RX ADMIN — PROPOFOL 10 MCG/KG/MIN: 10 INJECTION, EMULSION INTRAVENOUS at 04:15

## 2020-01-01 RX ADMIN — LORAZEPAM 2 MG: 2 INJECTION INTRAMUSCULAR; INTRAVENOUS at 03:01

## 2020-01-01 RX ADMIN — SODIUM CHLORIDE 4 UNITS/HR: 9 INJECTION, SOLUTION INTRAVENOUS at 01:15

## 2020-01-01 RX ADMIN — SODIUM BICARBONATE 50 MEQ: 84 INJECTION, SOLUTION INTRAVENOUS at 02:58

## 2020-01-01 RX ADMIN — PHENYLEPHRINE HYDROCHLORIDE 600 MCG/MIN: 10 INJECTION INTRAVENOUS at 07:37

## 2020-01-01 RX ADMIN — ONDANSETRON 4 MG: 2 SOLUTION INTRAMUSCULAR; INTRAVENOUS at 11:20

## 2020-01-01 RX ADMIN — SODIUM BICARBONATE 50 MEQ: 84 INJECTION INTRAVENOUS at 09:54

## 2020-01-01 RX ADMIN — ONDANSETRON 4 MG: 2 INJECTION INTRAMUSCULAR; INTRAVENOUS at 22:00

## 2020-01-01 RX ADMIN — SODIUM BICARBONATE 50 MEQ: 84 INJECTION INTRAVENOUS at 07:24

## 2020-01-01 RX ADMIN — POTASSIUM CHLORIDE 10 MEQ: 7.46 INJECTION, SOLUTION INTRAVENOUS at 06:10

## 2020-01-01 RX ADMIN — INSULIN GLARGINE 30 UNITS: 100 INJECTION, SOLUTION SUBCUTANEOUS at 05:50

## 2020-01-01 RX ADMIN — EPINEPHRINE 30 MCG: 0.1 INJECTION INTRACARDIAC; INTRAVENOUS at 06:17

## 2020-01-01 RX ADMIN — PHENYLEPHRINE HYDROCHLORIDE 300 MCG/MIN: 10 INJECTION INTRAVENOUS at 06:06

## 2020-01-01 RX ADMIN — SODIUM BICARBONATE 50 MEQ: 84 INJECTION, SOLUTION INTRAVENOUS at 03:35

## 2020-01-01 RX ADMIN — SODIUM CHLORIDE: 9 INJECTION, SOLUTION INTRAVENOUS at 05:00

## 2020-01-01 RX ADMIN — SODIUM BICARBONATE 150 MEQ: 84 INJECTION, SOLUTION INTRAVENOUS at 04:32

## 2020-01-01 RX ADMIN — INSULIN GLARGINE 15 UNITS: 100 INJECTION, SOLUTION SUBCUTANEOUS at 18:35

## 2020-01-01 RX ADMIN — METRONIDAZOLE 500 MG: 500 INJECTION, SOLUTION INTRAVENOUS at 08:14

## 2020-01-01 RX ADMIN — ROCURONIUM BROMIDE 100 MG: 10 INJECTION, SOLUTION INTRAVENOUS at 03:06

## 2020-01-01 RX ADMIN — SODIUM CHLORIDE 1000 ML: 9 INJECTION, SOLUTION INTRAVENOUS at 03:40

## 2020-01-01 RX ADMIN — SODIUM CHLORIDE, POTASSIUM CHLORIDE, SODIUM LACTATE AND CALCIUM CHLORIDE: 600; 310; 30; 20 INJECTION, SOLUTION INTRAVENOUS at 18:04

## 2020-01-01 RX ADMIN — EPINEPHRINE 5 MCG/MIN: 1 INJECTION INTRAMUSCULAR; INTRAVENOUS; SUBCUTANEOUS at 06:01

## 2020-01-01 RX ADMIN — SODIUM PHOSPHATE, MONOBASIC, MONOHYDRATE AND SODIUM PHOSPHATE, DIBASIC, ANHYDROUS 15 MMOL: 276; 142 INJECTION, SOLUTION INTRAVENOUS at 01:26

## 2020-01-01 RX ADMIN — SODIUM CHLORIDE 2000 ML: 9 INJECTION, SOLUTION INTRAVENOUS at 22:00

## 2020-01-01 RX ADMIN — SODIUM PHOSPHATE, MONOBASIC, MONOHYDRATE AND SODIUM PHOSPHATE, DIBASIC, ANHYDROUS 30 MMOL: 276; 142 INJECTION, SOLUTION INTRAVENOUS at 19:00

## 2020-01-01 RX ADMIN — FERROUS SULFATE TAB 325 MG (65 MG ELEMENTAL FE) 325 MG: 325 (65 FE) TAB at 05:50

## 2020-01-01 RX ADMIN — FAMOTIDINE 20 MG: 10 INJECTION INTRAVENOUS at 08:17

## 2020-01-01 RX ADMIN — CIPROFLOXACIN AND DEXAMETHASONE 4 DROP: 3; 1 SUSPENSION/ DROPS AURICULAR (OTIC) at 20:16

## 2020-01-01 RX ADMIN — EPINEPHRINE 1 MG: 0.1 INJECTION, SOLUTION ENDOTRACHEAL; INTRACARDIAC; INTRAVENOUS at 02:54

## 2020-01-01 RX ADMIN — ONDANSETRON 4 MG: 2 SOLUTION INTRAMUSCULAR; INTRAVENOUS at 12:50

## 2020-01-01 RX ADMIN — NOREPINEPHRINE BITARTRATE 50 MCG/MIN: 1 INJECTION, SOLUTION, CONCENTRATE INTRAVENOUS at 07:36

## 2020-01-01 RX ADMIN — EPINEPHRINE 15 MCG/MIN: 1 INJECTION INTRAMUSCULAR; INTRAVENOUS; SUBCUTANEOUS at 07:39

## 2020-01-01 RX ADMIN — VECURONIUM BROMIDE 10 MG: 10 INJECTION, POWDER, LYOPHILIZED, FOR SOLUTION INTRAVENOUS at 07:11

## 2020-01-01 RX ADMIN — AZITHROMYCIN MONOHYDRATE 500 MG: 500 INJECTION, POWDER, LYOPHILIZED, FOR SOLUTION INTRAVENOUS at 12:25

## 2020-01-01 RX ADMIN — OXYCODONE HYDROCHLORIDE AND ACETAMINOPHEN 500 MG: 500 TABLET ORAL at 05:50

## 2020-01-01 RX ADMIN — POTASSIUM CHLORIDE 10 MEQ: 7.46 INJECTION, SOLUTION INTRAVENOUS at 09:05

## 2020-01-01 RX ADMIN — METOCLOPRAMIDE 10 MG: 10 TABLET ORAL at 11:53

## 2020-01-01 RX ADMIN — EPINEPHRINE 20 MCG: 0.1 INJECTION INTRACARDIAC; INTRAVENOUS at 05:48

## 2020-01-01 RX ADMIN — SODIUM BICARBONATE 50 MEQ: 84 INJECTION INTRAVENOUS at 05:38

## 2020-01-01 RX ADMIN — METOCLOPRAMIDE 10 MG: 10 TABLET ORAL at 20:38

## 2020-01-01 RX ADMIN — INSULIN HUMAN 7 UNITS: 100 INJECTION, SOLUTION PARENTERAL at 12:37

## 2020-01-01 RX ADMIN — DOXYCYCLINE 100 MG: 100 INJECTION, POWDER, LYOPHILIZED, FOR SOLUTION INTRAVENOUS at 20:16

## 2020-01-01 RX ADMIN — THIAMINE HYDROCHLORIDE 200 MG: 100 INJECTION, SOLUTION INTRAMUSCULAR; INTRAVENOUS at 07:41

## 2020-01-01 RX ADMIN — INSULIN HUMAN 5 UNITS: 100 INJECTION, SOLUTION PARENTERAL at 09:47

## 2020-01-01 RX ADMIN — VASOPRESSIN 0.03 UNITS/MIN: 20 INJECTION INTRAVENOUS at 05:21

## 2020-01-01 RX ADMIN — METOCLOPRAMIDE 10 MG: 10 TABLET ORAL at 05:50

## 2020-01-01 RX ADMIN — ENOXAPARIN SODIUM 40 MG: 100 INJECTION SUBCUTANEOUS at 05:49

## 2020-01-01 RX ADMIN — SODIUM BICARBONATE 50 MEQ: 84 INJECTION INTRAVENOUS at 07:11

## 2020-01-01 RX ADMIN — ACETAMINOPHEN 650 MG: 325 TABLET, FILM COATED ORAL at 06:32

## 2020-01-01 RX ADMIN — POTASSIUM CHLORIDE 40 MEQ: 1500 TABLET, EXTENDED RELEASE ORAL at 05:50

## 2020-01-01 RX ADMIN — ENOXAPARIN SODIUM 40 MG: 100 INJECTION SUBCUTANEOUS at 18:38

## 2020-01-01 RX ADMIN — CEFTRIAXONE SODIUM 1 G: 1 INJECTION, POWDER, FOR SOLUTION INTRAMUSCULAR; INTRAVENOUS at 07:03

## 2020-01-01 RX ADMIN — HYDROCORTISONE SODIUM SUCCINATE 100 MG: 100 INJECTION, POWDER, FOR SOLUTION INTRAMUSCULAR; INTRAVENOUS at 06:39

## 2020-01-01 RX ADMIN — ETOMIDATE 20 MG: 2 INJECTION INTRAVENOUS at 03:06

## 2020-01-01 RX ADMIN — ACETAMINOPHEN 650 MG: 325 TABLET, FILM COATED ORAL at 23:28

## 2020-01-01 RX ADMIN — EPINEPHRINE 30 MCG: 0.1 INJECTION INTRACARDIAC; INTRAVENOUS at 06:21

## 2020-01-01 RX ADMIN — PHENYLEPHRINE HYDROCHLORIDE 300 MCG/MIN: 10 INJECTION INTRAVENOUS at 04:32

## 2020-01-01 RX ADMIN — DOXYCYCLINE 100 MG: 100 INJECTION, POWDER, LYOPHILIZED, FOR SOLUTION INTRAVENOUS at 05:40

## 2020-01-01 RX ADMIN — IOHEXOL 53 ML: 350 INJECTION, SOLUTION INTRAVENOUS at 04:21

## 2020-01-01 RX ADMIN — SODIUM CHLORIDE, POTASSIUM CHLORIDE, SODIUM LACTATE AND CALCIUM CHLORIDE 2000 ML: 600; 310; 30; 20 INJECTION, SOLUTION INTRAVENOUS at 01:15

## 2020-01-01 RX ADMIN — SODIUM BICARBONATE 100 MEQ: 84 INJECTION INTRAVENOUS at 07:38

## 2020-01-01 RX ADMIN — NOREPINEPHRINE BITARTRATE 20 MCG/MIN: 1 INJECTION, SOLUTION, CONCENTRATE INTRAVENOUS at 04:32

## 2020-01-01 RX ADMIN — ONDANSETRON 4 MG: 2 INJECTION INTRAMUSCULAR; INTRAVENOUS at 17:58

## 2020-01-01 RX ADMIN — CALCIUM CHLORIDE 1 G: 100 INJECTION INTRAVENOUS; INTRAVENTRICULAR at 05:38

## 2020-01-01 RX ADMIN — SODIUM BICARBONATE 50 MEQ: 84 INJECTION INTRAVENOUS at 06:37

## 2020-01-01 RX ADMIN — OMEPRAZOLE 20 MG: 20 CAPSULE, DELAYED RELEASE ORAL at 05:50

## 2020-01-01 RX ADMIN — Medication 500 MCG: at 05:38

## 2020-01-01 RX ADMIN — MAGNESIUM SULFATE IN WATER 2 G: 40 INJECTION, SOLUTION INTRAVENOUS at 06:11

## 2020-01-01 RX ADMIN — CALCIUM CHLORIDE 1 G: 100 INJECTION INTRAVENOUS; INTRAVENTRICULAR at 05:57

## 2020-01-01 RX ADMIN — ALBUMIN (HUMAN) 25 G: 2.5 SOLUTION INTRAVENOUS at 06:50

## 2020-01-01 RX ADMIN — HEPARIN 500 UNITS: 100 SYRINGE at 14:23

## 2020-01-01 RX ADMIN — CIPROFLOXACIN AND DEXAMETHASONE 4 DROP: 3; 1 SUSPENSION/ DROPS AURICULAR (OTIC) at 05:49

## 2020-01-01 RX ADMIN — SODIUM CHLORIDE 1000 ML: 9 INJECTION, SOLUTION INTRAVENOUS at 05:29

## 2020-01-01 ASSESSMENT — ENCOUNTER SYMPTOMS
PHOTOPHOBIA: 0
MYALGIAS: 1
DOUBLE VISION: 0
ABDOMINAL PAIN: 0
CHILLS: 0
SHORTNESS OF BREATH: 0
SPUTUM PRODUCTION: 0
VOMITING: 1
TINGLING: 0
DEPRESSION: 0
COUGH: 0
FEVER: 0
PHOTOPHOBIA: 0
VOMITING: 1
TREMORS: 0
PALPITATIONS: 0
NECK PAIN: 0
PALPITATIONS: 0
BACK PAIN: 0
NAUSEA: 0
FEVER: 0
TINGLING: 0
NAUSEA: 1
ABDOMINAL PAIN: 1
SHORTNESS OF BREATH: 0
DIARRHEA: 0
SORE THROAT: 0
HEMOPTYSIS: 0
WHEEZING: 0
CHILLS: 0
DIZZINESS: 0
FOCAL WEAKNESS: 0
HEADACHES: 0
BRUISES/BLEEDS EASILY: 0

## 2020-01-01 ASSESSMENT — COGNITIVE AND FUNCTIONAL STATUS - GENERAL
SUGGESTED CMS G CODE MODIFIER MOBILITY: CH
MOBILITY SCORE: 24
SUGGESTED CMS G CODE MODIFIER DAILY ACTIVITY: CH
DAILY ACTIVITIY SCORE: 24

## 2020-01-01 ASSESSMENT — FIBROSIS 4 INDEX
FIB4 SCORE: 0.45
FIB4 SCORE: 0.51
FIB4 SCORE: 0.52
FIB4 SCORE: 0.65

## 2020-01-01 ASSESSMENT — PAIN DESCRIPTION - DESCRIPTORS: DESCRIPTORS: ACHING

## 2020-01-01 ASSESSMENT — LIFESTYLE VARIABLES: SUBSTANCE_ABUSE: 0

## 2020-03-22 PROBLEM — K21.9 GERD (GASTROESOPHAGEAL REFLUX DISEASE): Status: ACTIVE | Noted: 2020-01-01

## 2020-03-22 PROBLEM — R05.9 COUGH: Status: ACTIVE | Noted: 2020-01-01

## 2020-03-22 PROBLEM — H66.90 OTITIS MEDIA: Status: ACTIVE | Noted: 2020-01-01

## 2020-03-22 NOTE — ASSESSMENT & PLAN NOTE
PMH DM1  Glucose 339 on presentation  SSI  Long acting: glargine 15 units  Hypoglycemia protocol  Accuchecks timed with meals  Hba1c 5 months ago 14  Reports that blood glucose generally is controlled, runs between 150-250. Is disciplined with daily DM pharmacological regime. Ordering new hba1c, will follow

## 2020-03-22 NOTE — H&P
History & Physical Note    Date of Admission: 3/22/2020  Admission Status: Inpatient  [facility] Team: [facility] Team  Attending: Dr. Worley  Senior Resident: Dr. Tiwari  Intern: Dr. Worley  Contact Number: [phone]    Chief Complaint: Nausea/Vomiting/Diarrhea; Abdominal Pain; Shortness of Breath; Cough; and Ear Pain       History of Present Illness (HPI):   Patient is a 29 year old female with a PMH of DM1 , gastroparesis, migraines, and past episodes of DKA presenting to the ED with a day-long hx of vomit ,abdominal pain, and left sided ear pain. Reports that blood glucose generally is controlled, runs between 150-250. Is disciplined with daily DM pharmacological regime.     She had 6 episodes of bilious emesis since 3 to 4 a.m. Denies hematemesis and diarrhea. Also endorses epigastric abdominal pain. Has not taken anything for symptom control. No alleviating factors nor exacerbating factors. Asked what last meal was , reports that had lentils last night and has not eaten since. Dyspnea was noted in patient chart but patient denied it when I asked. Has an intermittent dry cough. Denies fever, chills, recent travels, myalgia, sore throat.        ED V/S-  97.9 temp, 109 pulse, 20 RR, 143/103 BP, 99 %02    Meds given in ED-compazine    Labs-  Ketones- pending  Lipase-wnl  Cmp-gluc 339, alk phosp 150  Cbc- unremarkable  Bcx-pending  U/a- pending    Imaging-  Cxr-unremarkable    Ekg- pending      Review of Systems: Review of Systems   Constitutional: Positive for malaise/fatigue. Negative for chills and fever.   HENT: Negative for ear pain and tinnitus.    Eyes: Negative for double vision and photophobia.   Respiratory: Negative for hemoptysis, sputum production and shortness of breath.    Cardiovascular: Negative for chest pain and palpitations.   Gastrointestinal: Positive for abdominal pain and vomiting. Negative for nausea.   Genitourinary: Negative for frequency and urgency.   Musculoskeletal: Negative for back pain  and neck pain.   Skin: Negative for itching and rash.   Neurological: Negative for tingling and tremors.   Endo/Heme/Allergies: Negative for environmental allergies. Does not bruise/bleed easily.   Psychiatric/Behavioral: Negative for substance abuse and suicidal ideas.        Past Medical History:    has a past medical history of Backpain, Bronchitis, Diabetes type 1, controlled ([facility]), DKA (diabetic ketoacidoses) ([facility]), Fall, Gastroparesis, Kidney infection, Pneumonia, and UTI (urinary tract infection).    Past Surgical History:  has a past surgical history that includes gastroscopy-endo (2014); gastroscopy with biopsy (2014); other; submandible abscess incision and drainage (Left, 2017); dental extraction(s) (2017); and gastroscopy-endo (N/A, 2018).    Medications:   Prior to Admission Medications   Prescriptions Last Dose Informant Patient Reported? Taking?   Blood Glucose Test Strips   No No   Sig: Use one True Metrix strip to test blood sugar four times daily.   Lancets   No No   Sig: Use one True Metrix lancet to test blood sugar 4 times daily.   SUMAtriptan (IMITREX) 50 MG Tab  Patient's Home Pharmacy No No   Sig: Take 1 Tab by mouth Once PRN for Migraine. Can repeat in 2 hrs if needed   Urine Glucose-Ketones Test Strip   No No   Si Strip by In Vitro route 2 Times a Day.   ascorbic acid (VITAMIN C) 500 MG tablet  Patient's Home Pharmacy No No   Sig: Take 1 Tab by mouth every day.   ferrous sulfate 325 (65 Fe) MG tablet  Patient's Home Pharmacy No No   Sig: Take 1 Tab by mouth every day.   insulin glargine (BASAGLAR KWIKPEN) 100 UNIT/ML Solution Pen-injector injection  Patient's Home Pharmacy No No   Sig: Inject 30 Units as instructed 2 Times a Day.   insulin lispro, Human, (ADMELOG SOLOSTAR) 100 UNIT/ML Solution Pen-injector injection  Patient's Home Pharmacy No No   Sig: Inject 8 Units as instructed 3 times a day before meals.   metoclopramide (REGLAN) 10 MG Tab  Patient's Home  Pharmacy No No   Sig: Take 1 Tab by mouth 4 times a day.   omeprazole (PRILOSEC) 20 MG delayed-release capsule  Patient's Home Pharmacy No No   Sig: Take 1 Cap by mouth every day.   ondansetron (ZOFRAN ODT) 4 MG TABLET DISPERSIBLE  Patient's Home Pharmacy No No   Sig: Take 1 Tab by mouth every four hours as needed for Nausea (give PO if no IV route available).      Facility-Administered Medications: None        Allergies:   Allergies   Allergen Reactions   • Pcn [Penicillins] Shortness of Breath and Swelling     Per patient's Mom, patient tolerates Keflex   • Lisinopril Unspecified     Per historical: Reported pedal swelling. No facial/angioedema or rash nor respiratory symptoms.    • Promethazine Vomiting       Family History:   family history includes Cancer in her unknown relative; Hypertension in her maternal grandfather.     Social History:   Tobacco: denies  Alcohol: denies  Recreational drugs (illegal and prescription):  denies   Employment: works at [employer]  Activity Level: moderate   Recent travel:  denies  Primary Care Provider:  Bruna Laboy M.D.      Vitals:  Temp:  [36.4 °C (97.5 °F)-36.6 °C (97.9 °F)] 36.4 °C (97.5 °F)  Pulse:  [] 105  Resp:  [15-20] 15  BP: (116-145)/() 116/72  SpO2:  [94 %-99 %] 94 %    Physical Exam  Constitutional:       General: She is not in acute distress.     Appearance: She is not toxic-appearing.   HENT:      Head: Normocephalic and atraumatic.      Mouth/Throat:      Mouth: Mucous membranes are moist.      Pharynx: No oropharyngeal exudate or posterior oropharyngeal erythema.   Eyes:      Extraocular Movements: Extraocular movements intact.      Pupils: Pupils are equal, round, and reactive to light.   Neck:      Musculoskeletal: No neck rigidity or muscular tenderness.   Cardiovascular:      Rate and Rhythm: Normal rate and regular rhythm.      Heart sounds: No murmur. No friction rub.   Pulmonary:      Effort: No respiratory distress.      Breath sounds: No  stridor. No wheezing.   Abdominal:      Tenderness: There is abdominal tenderness in the epigastric area. There is no guarding or rebound.   Genitourinary:     Rectum: Guaiac result negative.   Musculoskeletal:         General: No swelling, tenderness or deformity.   Skin:     Coloration: Skin is not jaundiced or pale.      Findings: No bruising or erythema.   Neurological:      Mental Status: She is alert. Mental status is at baseline.      Sensory: No sensory deficit.      Motor: No weakness.      Gait: Gait normal.         Labs:   Results for orders placed or performed during the hospital encounter of 03/22/20   CBC WITH DIFFERENTIAL   Result Value Ref Range    WBC 6.8 4.8 - 10.8 K/uL    RBC 4.58 4.20 - 5.40 M/uL    Hemoglobin 12.7 12.0 - 16.0 g/dL    Hematocrit 38.0 37.0 - 47.0 %    MCV 83.0 81.4 - 97.8 fL    MCH 27.7 27.0 - 33.0 pg    MCHC 33.4 (L) 33.6 - 35.0 g/dL    RDW 39.2 35.9 - 50.0 fL    Platelet Count 220 164 - 446 K/uL    MPV 9.9 9.0 - 12.9 fL    Neutrophils-Polys 62.50 44.00 - 72.00 %    Lymphocytes 24.80 22.00 - 41.00 %    Monocytes 9.20 0.00 - 13.40 %    Eosinophils 2.60 0.00 - 6.90 %    Basophils 0.60 0.00 - 1.80 %    Immature Granulocytes 0.30 0.00 - 0.90 %    Nucleated RBC 0.00 /100 WBC    Neutrophils (Absolute) 4.26 2.00 - 7.15 K/uL    Lymphs (Absolute) 1.69 1.00 - 4.80 K/uL    Monos (Absolute) 0.63 0.00 - 0.85 K/uL    Eos (Absolute) 0.18 0.00 - 0.51 K/uL    Baso (Absolute) 0.04 0.00 - 0.12 K/uL    Immature Granulocytes (abs) 0.02 0.00 - 0.11 K/uL    NRBC (Absolute) 0.00 K/uL   COMP METABOLIC PANEL   Result Value Ref Range    Sodium 133 (L) 135 - 145 mmol/L    Potassium 4.1 3.6 - 5.5 mmol/L    Chloride 99 96 - 112 mmol/L    Co2 22 20 - 33 mmol/L    Anion Gap 12.0 7.0 - 16.0    Glucose 329 (H) 65 - 99 mg/dL    Bun 8 8 - 22 mg/dL    Creatinine 0.35 (L) 0.50 - 1.40 mg/dL    Calcium 8.5 8.5 - 10.5 mg/dL    AST(SGOT) 15 12 - 45 U/L    ALT(SGPT) 16 2 - 50 U/L    Alkaline Phosphatase 150 (H) 30 - 99  U/L    Total Bilirubin 0.3 0.1 - 1.5 mg/dL    Albumin 3.6 3.2 - 4.9 g/dL    Total Protein 6.4 6.0 - 8.2 g/dL    Globulin 2.8 1.9 - 3.5 g/dL    A-G Ratio 1.3 g/dL   LIPASE   Result Value Ref Range    Lipase 13 11 - 82 U/L   BETA-HYDROXYBUTYRIC ACID   Result Value Ref Range    beta-Hydroxybutyric Acid 0.85 (H) 0.02 - 0.27 mmol/L   HCG QUAL SERUM   Result Value Ref Range    Beta-Hcg Qualitative Serum Negative Negative   ESTIMATED GFR   Result Value Ref Range    GFR If African American >60 >60 mL/min/1.73 m 2    GFR If Non African American >60 >60 mL/min/1.73 m 2   ACCU-CHEK GLUCOSE   Result Value Ref Range    Glucose - Accu-Ck 313 (H) 65 - 99 mg/dL   ACCU-CHEK GLUCOSE   Result Value Ref Range    Glucose - Accu-Ck 243 (H) 65 - 99 mg/dL        EKG:   Results for orders placed or performed during the hospital encounter of 10/13/19   EKG   Result Value Ref Range    Report       [facility] Emergency Dept.    Test Date:  2019-10-13  Pt Name:    AMANDA BRANCH            Department: ER  MRN:        [id number]                      Room:        05  Gender:     Female                       Technician: [id number]  :        1990                   Requested By:ASHELY SUAREZ  Order #:    [id number]                    Reading MD:    Measurements  Intervals                                Axis  Rate:       140                          P:          68  MA:         136                          QRS:        17  QRSD:       74                           T:          75  QT:         288  QTc:        440    Interpretive Statements  PACEMAKER SPIKES OR ARTIFACTS  SINUS TACHYCARDIA  PROBABLE LEFT ATRIAL ABNORMALITY  BORDERLINE T ABNORMALITIES, ANT-LAT LEADS  Compared to ECG 2019 11:24:30  T-wave abnormality now present          Imaging:   DX-CHEST-PORTABLE (1 VIEW)   Final Result      No acute cardiopulmonary process is seen.           Previous Data Review: Reviewed    * Abdominal pain secondary to suspected  DKA  Assessment & Plan  Patient is a 29 year old female with a PMH of DM1 , gastroparesis, migraines, and past episodes of DKA presenting to the ED with a day-long hx of vomit and abdominal pain. Reports that blood glucose generally is controlled, runs between 150-250. Is disciplined with daily DM pharmacological regime.  She had 6 episodes of bilious emesis since 3 to 4 a.m. Also endorses epigastric abdominal pain.  Asked what last meal was , reports that had lentils last night and has not eaten since. Dyspnea was noted in patient chart but patient denied it when I asked. Denies fever, chills, recent travels, myalgia, sore throat, cough.       V/S borderline tachypnea at 20  Ketones- pending  Lipase-wnl  Cmp-gluc 339, k wnl    Plan:   Follow blood gluc labs/ CMP  IV fluids: LR at 150 ml/hr  Anti-emetics: reglan (as scheduled at home for gastroparesis), PRN zofran, no compazine as patient as allergy documented on chart (vomit)    Gastroparesis  Assessment & Plan  PMH gastroparesis  Cont home reglan    Diabetes type I (CMS-[facility])- (present on admission)  Assessment & Plan  PMH DM1  Glucose 339 on presentation  SSI  Long acting: glargine 15 units  Hypoglycemia protocol  Accuchecks timed with meals  Hba1c 5 months ago 14  Reports that blood glucose generally is controlled, runs between 150-250. Is disciplined with daily DM pharmacological regime. Ordering new hba1c, will follow      Cough  Assessment & Plan  Flu panel pending  Stress panel pending  Abx: azithromycin    Otitis media  Assessment & Plan  One day hx of left-sided ear pain  No fever, chills  Abx coverage: azithromycin    GERD (gastroesophageal reflux disease)  Assessment & Plan  PMH GERD  Cont. Home PPI

## 2020-03-22 NOTE — ED NOTES
Med rec complete per patient at bedside. Allergies verified. No ABX in last 14 days.    [facility]

## 2020-03-22 NOTE — ED PROVIDER NOTES
ED Provider Note    CHIEF COMPLAINT  Chief Complaint   Patient presents with   • Nausea/Vomiting/Diarrhea   • Abdominal Pain   • Shortness of Breath   • Cough   • Ear Pain       HPI  Alis Sparks is a 29 y.o. female who presents to emergency room today with nausea vomiting diarrhea, shortness of breath, left ear pain and cough.  Patient states she believes she is in DKA her blood sugars are elevated and she has had this several times in the past.  She has not missed any doses of her insulin.  Denies any fever chills no exposure to Covid 19 or risk factors for this does not be criteria for testing.  She has had frequent vomiting unable to hold anything down for the last 8 to 12 hours.  Chills but no fever, no sore throat.  Symptoms started earlier this morning.    REVIEW OF SYSTEMS  See HPI for further details. All other systems are negative.     PAST MEDICAL HISTORY  Past Medical History:   Diagnosis Date   • Backpain    • Bronchitis    • Diabetes type 1, controlled ([facility])     tests 4-5 times daily   • DKA (diabetic ketoacidoses) ([facility])    • Fall    • Gastroparesis    • Kidney infection    • Pneumonia    • UTI (urinary tract infection)        FAMILY HISTORY  [unfilled]    SOCIAL HISTORY  Social History     Socioeconomic History   • Marital status: Single     Spouse name: Not on file   • Number of children: Not on file   • Years of education: Not on file   • Highest education level: Not on file   Occupational History   • Not on file   Social Needs   • Financial resource strain: Not hard at all   • Food insecurity     Worry: Never true     Inability: Never true   • Transportation needs     Medical: No     Non-medical: No   Tobacco Use   • Smoking status: Never Smoker   • Smokeless tobacco: Never Used   Substance and Sexual Activity   • Alcohol use: No   • Drug use: No   • Sexual activity: Never   Lifestyle   • Physical activity     Days per week: Not on file     Minutes per session: Not on file   • Stress: Not  on file   Relationships   • Social connections     Talks on phone: Not on file     Gets together: Not on file     Attends Mandaen service: Not on file     Active member of club or organization: Not on file     Attends meetings of clubs or organizations: Not on file     Relationship status: Not on file   • Intimate partner violence     Fear of current or ex partner: Not on file     Emotionally abused: Not on file     Physically abused: Not on file     Forced sexual activity: Not on file   Other Topics Concern   • Not on file   Social History Narrative   • Not on file       SURGICAL HISTORY  Past Surgical History:   Procedure Laterality Date   • GASTROSCOPY-ENDO N/A 6/9/2018    Procedure: GASTROSCOPY-ENDO WITH BIOPSIES AND DIALATION;  Surgeon: Marino Black M.D.;  Location: SURGERY [facility];  Service: Gastroenterology   • SUBMANDIBLE ABSCESS INCISION AND DRAINAGE Left 11/8/2017    Procedure: SUBMANDIBLE ABSCESS INCISION AND DRAINAGE;  Surgeon: Osman Young M.D.;  Location: SURGERY [facility];  Service: Dental   • DENTAL EXTRACTION(S)  11/8/2017    Procedure: DENTAL EXTRACTION(S);  Surgeon: Osman Young M.D.;  Location: SURGERY [facility];  Service: Dental   • GASTROSCOPY-ENDO  9/18/2014    Performed by Sylvain PERRY M.D. at ENDOSCOPY [facility]   • GASTROSCOPY WITH BIOPSY  9/18/2014    Performed by Sylvain PERRY M.D. at ENDOSCOPY [facility]   • OTHER      surgery to patch lung after pneumor from central line placement       CURRENT MEDICATIONS  Home Medications     Reviewed by Reena Danielson on 03/22/20 at 1523  Med List Status: Complete   Medication Last Dose Status   ascorbic acid (VITAMIN C) 500 MG tablet 3/21/2020 Active   ferrous sulfate 325 (65 Fe) MG tablet 3/21/2020 Active   insulin glargine (BASAGLAR KWIKPEN) 100 UNIT/ML Solution Pen-injector injection 3/21/2020 Active   insulin lispro, Human, (ADMELOG SOLOSTAR) 100 UNIT/ML Solution Pen-injector injection 3/21/2020  "Active   metoclopramide (REGLAN) 10 MG Tab 3/21/2020 Active   omeprazole (PRILOSEC) 20 MG delayed-release capsule 3/21/2020 Active                ALLERGIES  Allergies   Allergen Reactions   • Pcn [Penicillins] Shortness of Breath and Swelling     Per patient's Mom, patient tolerates Keflex   • Lisinopril Unspecified     Per historical: Reported pedal swelling. No facial/angioedema or rash nor respiratory symptoms.    • Promethazine Vomiting       PHYSICAL EXAM  VITAL SIGNS: /87   Pulse (!) 102   Temp 36.6 °C (97.9 °F)   Resp 15   Ht 1.651 m (5' 5\")   Wt 80 kg (176 lb 5.9 oz)   SpO2 95%   BMI 29.35 kg/m²       Constitutional: Well developed, Well nourished,  acute distress, Non-toxic appearance.   HENT: Normocephalic, Atraumatic, Bilateral external ears normal.  Left TM is erythematous consistent otitis media, Oropharynx dry patient appears dehydrated, No oral exudates, Nose normal.   Eyes: PERRLA, EOMI, Conjunctiva normal, No discharge.   Neck: Normal range of motion, No tenderness, Supple, No stridor.   Lymphatic: No lymphadenopathy noted.   Cardiovascular: Normal heart rate, Normal rhythm, No murmurs, No rubs, No gallops.   Thorax & Lungs: Normal breath sounds, No respiratory distress, No wheezing, No chest tenderness.   Abdomen: Bowel sounds normal, Soft, epigastric tenderness, No masses, No pulsatile masses.  No rebound, guarding or peritoneal signs noted  Skin: Warm, Dry, No erythema, No rash.   Back: No tenderness, No CVA tenderness.      Extremities: Intact distal pulses, No edema, No tenderness, No cyanosis, No clubbing.   Musculoskeletal: Good range of motion in all major joints. No tenderness to palpation or major deformities noted.   Neurologic: Alert & oriented x 3, Normal motor function, Normal sensory function, No focal deficits noted.   Psychiatric: Affect normal, Judgment normal, Mood normal.           RADIOLOGY/PROCEDURES  DX-CHEST-PORTABLE (1 VIEW)   Final Result      No acute " cardiopulmonary process is seen.            COURSE & MEDICAL DECISION MAKING  Pertinent Labs & Imaging studies reviewed. (See chart for details)  Patient appeared dehydrated unable to hold anything down IV fluids were initiated she is given 2 to 3 L and Zofran x2 Compazine she continued to have nausea with this was admitted to the hospital.  Blood sugars also elevated greater than 350, given insulin for this.  Unable to hold anything down continue to have nausea vomiting was admitted to the hospital in the care of the hospitalist.  There is no evidence of DKA blood sugar is elevated but CO2 is normal anion gap is normal.  Patient started on Zithromax 500 mg IV piggyback.  Patient be made to hospitalist I discussed the case please see orders for further plan.    FINAL IMPRESSION  1.  Intractable nausea/vomiting  2.  Hyperglycemia  3.  Left otitis media  4.  Type 1 diabetes         Electronically signed by: Cholo Butler D.O., 3/22/2020 3:42 PM

## 2020-03-22 NOTE — ED TRIAGE NOTES
Pt here for cough, sob, n/v and left ear pain. Hx DM insulin dependent. BG elevated. HX DKA. Patient believes shes in DKA

## 2020-03-22 NOTE — SENIOR ADMIT NOTE
[facility] SENIOR ADMIT NOTE:    Patient ID:   Name:             Alis Sparks   YOB: 1990  Age:                 29 y.o.  female   MRN:               [id number]                                                          Chief Complaint:       Nausea  Vomiting    History of Present Illness:    Mr. Sparks is a 29 y.o. female with a PMHx of type 1 diabetes on insulin, multiple admissions in the past for DKA, gastroparesis presented to the ED with symptoms of nausea, vomiting multiple episodes since the last night.  Patient reports she might have DKA as her symptoms have been similar to the DKA episodes in the past.  She reports no sick contacts, no travel history.  She also has symptoms of cough with whitish sputum.  Denies fever, chills, weight loss, shortness of breath, blurry vision, dysuria, hematuria.  Has epigastric pain since the last night.  Denies blood in the vomitus, greenish color of the emesis.  Reports she has been taking her insulin and did not miss any doses.    IMAGING: Positive for    DX-CHEST-PORTABLE (1 VIEW)   Final Result      No acute cardiopulmonary process is seen.        Active Ambulatory Problems     Diagnosis Date Noted   • Diabetes type I (CMS-[facility]) 08/16/2011   • Hyperbilirubinemia 08/16/2011   • Elevated alkaline phosphatase level 08/16/2011   • Flank pain 03/02/2012   • Microalbuminuria 06/28/2012   • Vitamin D insufficiency 10/22/2012   • Hypokalemia 10/20/2013   • Facial cellulitis 01/14/2014   • Pneumothorax on right 01/22/2014   • Encephalopathy 01/14/2015   • Abdominal pain secondary to suspected DKA 04/14/2015   • Gastroparesis 04/16/2015   • Mandibular abscess 11/08/2017   • Patient non adherence 04/01/2018   • Overweight 04/08/2018   • Esophagitis 06/13/2018   • Pyloric stenosis 06/13/2018   • Neuropathy ([facility]) 11/09/2018   • Dyslipidemia 01/09/2019   • Essential hypertension 01/09/2019   • Pneumomediastinum ([facility]) 03/20/2019   • Normocytic  "anemia 08/01/2019   • Diabetic ketoacidosis with coma associated with type 1 diabetes mellitus (HCC) 10/13/2019   • Leukocytosis 10/13/2019   • Port-A-Cath in place 10/14/2019   • Thrombocytosis (HCC) 10/14/2019   • Hypophosphatemia 10/14/2019     Resolved Ambulatory Problems     Diagnosis Date Noted   • Sepsis (HCC) 08/16/2011   • Hypertension 07/27/2017   • Upper GI bleeding 01/09/2019   • Hypomagnesemia 07/30/2019     Past Medical History:   Diagnosis Date   • Backpain    • Bronchitis    • Diabetes type 1, controlled ([facility])    • DKA (diabetic ketoacidoses) ([facility])    • Fall    • Kidney infection    • Pneumonia    • UTI (urinary tract infection)        PHYSICAL EXAM  Vitals:   Weight/BMI: Body mass index is 29.35 kg/m².  /72   Pulse (!) 105   Temp 36.4 °C (97.5 °F)   Resp 15   Ht 1.651 m (5' 5\")   Wt 80 kg (176 lb 5.9 oz)   SpO2 94%   Vitals:    03/22/20 1302 03/22/20 1401 03/22/20 1501 03/22/20 1601   BP: 142/82 128/89 135/87 116/72   Pulse: (!) 105 (!) 110 (!) 102 (!) 105   Resp:       Temp:    36.4 °C (97.5 °F)   SpO2: 98% 96% 95% 94%   Weight:       Height:         Oxygen Therapy:  Pulse Oximetry: 94 %      ASSESSMENT:    1) Nausea and vomiting  2) Epigastric pain  3) Hyperglycemia with type 1 DM  4) Cough   5) Sinus tachycardia  6) Hypophosphatemia  7) History of DKA    PLAN:  Admit the patient to telemetry  IV fluids LR @ 150cc/hr  Anti emetics prn  Azithromycin total 5 days, received 500 mg in the ER   Insulin sliding scale 2-9 units and insulin Lantus 30 units BID   Monitor serial BMP's, mag and phosphorus. Hemoglobin A1 C Is pending.   Replace electrolytes    Please refer to Interns  H&P for complete admission details.        "

## 2020-03-22 NOTE — ASSESSMENT & PLAN NOTE
Patient is a 29 year old female with a PMH of DM1 , gastroparesis, migraines, and past episodes of DKA presenting to the ED with a day-long hx of vomit and abdominal pain. Reports that blood glucose generally is controlled, runs between 150-250. Is disciplined with daily DM pharmacological regime.  She had 6 episodes of bilious emesis since 3 to 4 a.m. Also endorses epigastric abdominal pain.  Asked what last meal was , reports that had lentils last night and has not eaten since. Dyspnea was noted in patient chart but patient denied it when I asked. Denies fever, chills, recent travels, myalgia, sore throat, cough.       V/S borderline tachypnea at 20  Ketones- pending  Lipase-wnl  Cmp-gluc 339, k wnl    Plan:   Follow blood gluc labs/ CMP  IV fluids: LR at 150 ml/hr  Anti-emetics: reglan (as scheduled at home for gastroparesis), PRN zofran, no compazine as patient as allergy documented on chart (vomit)

## 2020-03-23 NOTE — PROGRESS NOTES
Discharge information including new medications and follow-up appointments discussed with patient at bedside. All questions answered at this time. Patient escorted via wheelchair transport to front entrance and discharged home with family with all belongings.

## 2020-03-23 NOTE — PROGRESS NOTES
Assumed care of patient at 0700. Patient alert and oriented, SR/ST on tele. Pt sitting up in bed eating breakfast. Tolerating full liquid diet without n/v. Diet advanced to regular diabetic diet per order. POC discussed with patient and all questions answered. Bed in low and locked position. Call light within reach. Will continue to monitor.

## 2020-03-23 NOTE — CARE PLAN
Problem: Infection  Goal: Will remain free from infection  Outcome: PROGRESSING AS EXPECTED  Assess signs and symptoms of infection  Implement standard precautions and perform hand washing before and after patient contact     Problem: Knowledge Deficit  Goal: Knowledge of disease process/condition, treatment plan, diagnostic tests, and medications will improve  Outcome: PROGRESSING AS EXPECTED   Explain information regarding disease process/condition, treatment plan, diagnostic tests, and medications and document in  education

## 2020-03-23 NOTE — DISCHARGE INSTRUCTIONS
Continue course of antibiotics and ear drops for 6 days.  Stay home from work until symptoms have fully resolved.    Discharge Instructions    Discharged to home by car with relative. Discharged via walking, hospital escort: Yes.  Special equipment needed: Not Applicable    Be sure to schedule a follow-up appointment with your primary care doctor or any specialists as instructed.     Discharge Plan:   Diet Plan: Discussed  Activity Level: Discussed  Confirmed Follow up Appointment: Patient to Call and Schedule Appointment  Confirmed Symptoms Management: Discussed  Medication Reconciliation Updated: Yes  Influenza Vaccine Indication: Not indicated: Previously immunized this influenza season and > 8 years of age    I understand that a diet low in cholesterol, fat, and sodium is recommended for good health. Unless I have been given specific instructions below for another diet, I accept this instruction as my diet prescription.   Other diet: Healthy    Special Instructions: None    · Is patient discharged on Warfarin / Coumadin?   No     Depression / Suicide Risk    As you are discharged from this [facility] facility, it is important to learn how to keep safe from harming yourself.    Recognize the warning signs:  · Abrupt changes in personality, positive or negative- including increase in energy   · Giving away possessions  · Change in eating patterns- significant weight changes-  positive or negative  · Change in sleeping patterns- unable to sleep or sleeping all the time   · Unwillingness or inability to communicate  · Depression  · Unusual sadness, discouragement and loneliness  · Talk of wanting to die  · Neglect of personal appearance   · Rebelliousness- reckless behavior  · Withdrawal from people/activities they love  · Confusion- inability to concentrate     If you or a loved one observes any of these behaviors or has concerns about self-harm, here's what you can do:  · Talk about it- your feelings and  reasons for harming yourself  · Remove any means that you might use to hurt yourself (examples: pills, rope, extension cords, firearm)  · Get professional help from the community (Mental Health, Substance Abuse, psychological counseling)  · Do not be alone:Call your Safe Contact- someone whom you trust who will be there for you.  · Call your local CRISIS HOTLINE 824-1009 or 928-951-4533  · Call your local Children's Mobile Crisis Response Team [place] (164) 549-7849 or wwwBanter!  · Call the toll free National Suicide Prevention Hotlines   · National Suicide Prevention Lifeline 661-895-HUFH (3541)  · National Hope Line Network 800-SUICIDE (917-5115)      Otitis Media, Adult  Otitis media is redness, soreness, and puffiness (swelling) in the space just behind your eardrum (middle ear). It may be caused by allergies or infection. It often happens along with a cold.  Follow these instructions at home:  · Take your medicine as told. Finish it even if you start to feel better.  · Only take over-the-counter or prescription medicines for pain, discomfort, or fever as told by your doctor.  · Follow up with your doctor as told.  Contact a doctor if:  · You have otitis media only in one ear, or bleeding from your nose, or both.  · You notice a lump on your neck.  · You are not getting better in 3-5 days.  · You feel worse instead of better.  Get help right away if:  · You have pain that is not helped with medicine.  · You have puffiness, redness, or pain around your ear.  · You get a stiff neck.  · You cannot move part of your face (paralysis).  · You notice that the bone behind your ear hurts when you touch it.  This information is not intended to replace advice given to you by your health care provider. Make sure you discuss any questions you have with your health care provider.  Document Released: 06/05/2009 Document Revised: 05/25/2017 Document Reviewed: 07/15/2014  [employer] Interactive Patient Education ©  2017 [employer]

## 2020-03-23 NOTE — PROGRESS NOTES
JASMINA Paige spoke with pt via TC to introduce CCM services. Pt accepted. Pt confirmed PCP is Bruna Laboy from [facility]. Follow up appmnt scheduled for 4/7/20 at 2:10pm. Pt reports no trouble paying for resources such as care, housing, and food. Pt feels confident in managing her health after d/c. Pt interested in Meds to Beds.     Plan: Follow up call after d/c.

## 2020-03-23 NOTE — DISCHARGE PLANNING
Care Transition Team Assessment    In the case of an emergency, please contact Flaquito Sanders at [phone].     This RN Case Manager reviewed the patient's chart. Patient lives with her mother in a one story home. Patient uses the [facility] pharmacy on [address]. Prior to current hospitalization, patient was completely independent with ADLS/IADLS. Patient's primary care physician is Bruna Laboy MD. Patient's discharge plan is home with no needs from case management.     Elopement Risk  Legal Hold: No  Ambulatory or Self Mobile in Wheelchair: Yes  Disoriented: No  Psychiatric Symptoms: None  History of Wandering: No  Elopement this Admit: No  Vocalizing Wanting to Leave: No  Displays Behaviors, Body Language Wanting to Leave: No-Not at Risk for Elopement  Elopement Risk: Not at Risk for Elopement    Interdisciplinary Discharge Planning  Primary Care Physician: Bruna Laboy MD  Lives with - Patient's Self Care Capacity: Parents  Patient or legal guardian wants to designate a caregiver (see row info): No  Support Systems: Parent, Family Member(s)  Housing / Facility: 1 Story House  Able to Return to Previous ADL's: Yes  Mobility Issues: No  Prior Services: None  Patient Expects to be Discharged to:: home  Assistance Needed: No    Discharge Preparedness  What are your discharge supports?: Parent  Prior Functional Level: Ambulatory  Difficulity with ADLs: None  Difficulity with IADLs: None    Functional Assesment  Prior Functional Level: Ambulatory    Finances  Financial Barriers to Discharge: No  Prescription Coverage: Yes    Vision / Hearing Impairment  Vision Impairment : No  Hearing Impairment : No     Domestic Abuse  Have you ever been the victim of abuse or violence?: No  Physical Abuse or Sexual Abuse: No  Verbal Abuse or Emotional Abuse: No  Possible Abuse Reported to:: Not Applicable     Discharge Risks or Barriers  Patient risk factors: Complex medical needs, Noncompliance, No PCP    Anticipated  Discharge Information  Anticipated discharge disposition: Home  Discharge Address: [address] [facility] [address]  Discharge Contact Phone Number: [phone]

## 2020-03-23 NOTE — PROGRESS NOTES
Patient arrived to floor. Alert and oriented, tele on. 's. Pt vomited 100ml green emesis upon arrival. 2 RN skin check complete with CAIT Muse. Scab to right ear, no other skin breakdown noted. Fluids started, labs sent. MD paged for  and pt nauseated, vomiting and not eating. VO to half Lantus dose to 15 units and give SS correction of 3 units. Bed in low and locked position. Call light within reach. Will continue to monitor.

## 2020-03-23 NOTE — DISCHARGE SUMMARY
Internal Medicine Discharge Summary  Note Author: Claudia Vargas M.D.       Name Alis Sparks     1990   Age/Sex 29 y.o. female   MRN [id number]         Admit Date:  3/22/2020       Discharge Date:   3/23/2020    Service:   [facility] Internal Medicine Purple Team  Attending Physician(s):   Dr Forman        Senior Resident(s):   Dr Vargas  Travis Resident(s):   Dr Worley   PCP: Bruna Laboy M.D.      Primary Diagnosis:   Otitis Media   Hyperglycemia     Secondary Diagnoses:                Principal Problem:  Hyperglycemia   Active Problems:    Diabetes type I (CMS-[facility]) POA: Yes    Gastroparesis POA: Unknown    GERD (gastroesophageal reflux disease) POA: Unknown      Hospital Summary (Brief Narrative):       A 29 y.o. female with a PMHx of type 1 diabetes on insulin, multiple admissions in the past for DKA, gastroparesis presented to the ED with c/c of nausea, vomiting multiple episodes x 1 day.  Patient thought she might have DKA as her symptoms have been similar to the previous DKA episodes.  She reports no sick contacts, no travel history.  She also c/o cough with whitish sputum & left sided ear pain, bilateral ear fullness.  Denies fever, chills. Reported that she has been taking her insulin regularly.   At ER, found to have hyperglycemia. Hba1c 5 months ago 14, recheck a1c this admit: 13.  CXR nil acute. Per lab results, pt was not having DKA. N/V controlled with reglan (as scheduled at home for gastroparesis) & Zofran PRN.   Pt was found to have left sided otitis media on exam with otoscope & started Doxy x 7 days. She does use Q tips a few days ago, advised her not to use Q tips. Her abdominal pain,N/V resolved. Pt was d/c home in stable condition with Doxy x 7 days, follow up with PCP.      Patient /Hospital Summary (Details -- Problem Oriented) :      Otitis media  Assessment & Plan  One day hx of left-sided ear pain & bilateral ear fullness   No fever, chills  Red swollen tympanic  membrane on otoscopic exam   Doxy x 7 days         * Abdominal pain secondary to suspected DKA    Hyperglycemia   Pt was not having DKA per lab results   Plan:       Resolved with IVF & Anti-emetics: reglan (as scheduled at home for gastroparesis), PRN zofran, no compazine as patient as allergy documented on chart (vomit)    Gastroparesis  Assessment & Plan  PMHx gastroparesis  Cont home reglan    Diabetes type I (CMS-HCC)  Assessment & Plan  PMHx of DM1 x 13 yrs   Glucose 339 on presentation  SSI  Long acting: glargine 15 units  Hypoglycemia protocol  Accuchecks timed with meals  Hba1c 5 months ago 14 --- recheck A1c this admit 13   Will continue home DM meds     Otitis media  Assessment & Plan  One day hx of left-sided ear pain & bilateral ear fullness   No fever, chills  Doxy x 7 days     GERD (gastroesophageal reflux disease)  Assessment & Plan  PMH GERD  Cont. Home PPI        Consultants:     None     Procedures:        None     Imaging/ Testing:      CXR    Discharge Medications:         Medication Reconciliation: Completed       Medication List      START taking these medications      Instructions   ciprofloxacin/dexamethasone 0.3-0.1 % Susp  Commonly known as:  CIPRODEX   Place 4 Drops in left ear 2 Times a Day.  Dose:  4 Drop     doxycycline 100 MG capsule  Commonly known as:  MONODOX   Take 1 Cap by mouth 2 times a day for 6 days.  Dose:  100 mg     ondansetron 4 MG Tabs tablet  Commonly known as:  Zofran   Take 1 Tab by mouth every four hours as needed for Nausea/Vomiting for up to 5 days.  Dose:  4 mg        CONTINUE taking these medications      Instructions   ascorbic acid 500 MG tablet  Commonly known as:  VITAMIN C   Take 1 Tab by mouth every day.  Dose:  500 mg     ferrous sulfate 325 (65 Fe) MG tablet   Take 1 Tab by mouth every day.  Dose:  325 mg     insulin glargine 100 UNIT/ML Sopn injection  Commonly known as:  insulin glargine   Inject 30 Units as instructed 2 Times a Day.  Dose:  30 Units      insulin lispro (Human) 100 UNIT/ML injection PEN  Commonly known as:  Admelog SoloStar   Inject 8 Units as instructed 3 times a day before meals.  Dose:  8 Units     metoclopramide 10 MG Tabs  Commonly known as:  REGLAN   Take 1 Tab by mouth 4 times a day.  Dose:  10 mg     omeprazole 20 MG delayed-release capsule  Commonly known as:  PRILOSEC   Take 1 Cap by mouth every day.  Dose:  20 mg            Disposition:   Home     Diet:   DM diet     Activity:   As tolerated     Instructions:      Follow up with PCP.   The patient was instructed to return to the ER in the event of worsening symptoms. I have counseled the patient on the importance of compliance and the patient has agreed to proceed with all medical recommendations and follow up plan indicated above.   The patient understands that all medications come with benefits and risks. Risks may include permanent injury or death and these risks can be minimized with close reassessment and monitoring.        Primary Care Provider:     Bruna Laboy M.D.  Discharge summary faxed to primary care provider:  Completed  Copy of discharge summary given to the patient: Completed      Follow up appointment details :      No future appointments.  Bruna Laboy M.D.  [address] [facility] [address]  [phone]    Go on 4/7/2020  Please go to your Primary Care doctor appointment at 2:10pm. Thank froilan         Pending Studies:        None     Time spent on discharge day patient visit, preparing discharge paperwork and arranging for patient follow up.    Discharge Time (Minutes) :    60 mins   Hospital Course Type: Inpatient Stay < 2 midnights, patient recovered more rapidly than anticipated      Condition on Discharge    ______________________________________________________________________    Interval history/exam for day of discharge:    - Feeling better this AM. Abd pain, N/V resolved. C/o left sided ear pain, bilateral ear fullness.  Denies fever, chills.  - Plan to d/c  home after lunch if she tolerates the diet.     Review of Systems:   Constitutional:Negative for chills and fever.   HENT:    left sided ear pain, bilateral ear fullness +ve   Eyes: Negative for double vision and photophobia.   Respiratory: Negative for hemoptysis, sputum production and shortness of breath.    Cardiovascular: Negative for chest pain and palpitations.   Gastrointestinal:  Negative for Abd pain, N/V.    Genitourinary: Negative for frequency and urgency.   Musculoskeletal: Negative for back pain and neck pain.   Skin: Negative for itching and rash.   Neurological: Negative for tingling and tremors.   Endo/Heme/Allergies: Negative for environmental allergies. Does not bruise/bleed easily.   Psychiatric/Behavioral: Negative for substance abuse and suicidal ideas.       Physical Exam  Constitutional:  not in acute distress.  HEENT: NC AT, PERRLA, red tympanic membrane in both ears on otoscopic exam   Neck:      Musculoskeletal: No neck rigidity or muscular tenderness.   Cardiovascular:      Rate and Rhythm: Normal rate and regular rhythm.      Heart sounds: No murmur. No friction rub.   Pulmonary:      Effort: No respiratory distress.      Breath sounds: No stridor. No wheezing.   Abdominal:      Soft, NT ND, Bowel sound+ve.  There is no guarding or rebound.   Musculoskeletal:         General: No swelling, tenderness or deformity.   Skin:     Coloration: Skin is not jaundiced or pale.      Findings: No bruising or erythema.   Neurological:      Mental Status: She is alert. Mental status is at baseline.      Sensory: No sensory deficit.      Motor: No weakness.      Gait: Gait normal.       Most Recent Labs:    Lab Results   Component Value Date/Time    WBC 6.3 03/23/2020 01:30 AM    RBC 3.74 (L) 03/23/2020 01:30 AM    HEMOGLOBIN 10.4 (L) 03/23/2020 01:30 AM    HEMATOCRIT 31.0 (L) 03/23/2020 01:30 AM    MCV 82.9 03/23/2020 01:30 AM    MCH 27.8 03/23/2020 01:30 AM    MCHC 33.5 (L) 03/23/2020 01:30 AM    MPV  9.8 03/23/2020 01:30 AM    NEUTSPOLYS 55.40 03/23/2020 01:30 AM    LYMPHOCYTES 30.20 03/23/2020 01:30 AM    MONOCYTES 12.00 03/23/2020 01:30 AM    EOSINOPHILS 1.40 03/23/2020 01:30 AM    BASOPHILS 0.50 03/23/2020 01:30 AM    HYPOCHROMIA 1+ 09/07/2014 03:00 PM    ANISOCYTOSIS 1+ 10/13/2019 06:15 PM      Lab Results   Component Value Date/Time    SODIUM 139 03/23/2020 06:38 AM    POTASSIUM 3.7 03/23/2020 06:38 AM    CHLORIDE 106 03/23/2020 06:38 AM    CO2 24 03/23/2020 06:38 AM    GLUCOSE 200 (H) 03/23/2020 06:38 AM    BUN 8 03/23/2020 06:38 AM    CREATININE 0.31 (L) 03/23/2020 06:38 AM      Lab Results   Component Value Date/Time    ALTSGPT 12 03/23/2020 01:30 AM    ASTSGOT 12 03/23/2020 01:30 AM    ALKPHOSPHAT 127 (H) 03/23/2020 01:30 AM    TBILIRUBIN 0.3 03/23/2020 01:30 AM    DBILIRUBIN 0.1 08/17/2011 03:00 AM    LIPASE 13 03/22/2020 11:04 AM    LIPASE 14 03/22/2020 11:04 AM    ALBUMIN 3.1 (L) 03/23/2020 01:30 AM    GLOBULIN 2.2 03/23/2020 01:30 AM    PREALBUMIN 15.0 (L) 12/25/2018 01:42 AM    INR 1.26 (H) 07/27/2019 09:25 AM    MACROCYTOSIS 1+ 10/19/2013 07:45 AM     Lab Results   Component Value Date/Time    PROTHROMBTM 16.1 (H) 07/27/2019 09:25 AM    INR 1.26 (H) 07/27/2019 09:25 AM

## 2020-04-06 NOTE — PROGRESS NOTES
CHW made 3x follow up call attempts to reach Alis after d/c. No return calls. Alis will be d/c from [facility] services due to loss of contact.

## 2020-05-21 PROBLEM — E11.10 DKA (DIABETIC KETOACIDOSES): Status: ACTIVE | Noted: 2019-01-01

## 2020-05-21 PROBLEM — E86.0 DEHYDRATION: Status: ACTIVE | Noted: 2020-01-01

## 2020-05-21 PROBLEM — J96.01 ACUTE RESPIRATORY FAILURE WITH HYPOXIA (HCC): Status: ACTIVE | Noted: 2020-01-01

## 2020-05-21 PROBLEM — E83.52 HYPERCALCEMIA: Status: ACTIVE | Noted: 2020-01-01

## 2020-05-21 PROBLEM — I46.9 CARDIAC ARREST (HCC): Status: ACTIVE | Noted: 2020-01-01

## 2020-05-21 PROBLEM — R57.9 SHOCK (HCC): Status: ACTIVE | Noted: 2020-01-01

## 2020-05-21 NOTE — CARE PLAN
Problem: Infection  Goal: Will remain free from infection  Intervention: Implement standard precautions and perform hand washing before and after patient contact  Note: Standard precautions in place to prevent infection. Hand hygiene performed before and after patient contact.      Problem: Knowledge Deficit  Goal: Knowledge of disease process/condition, treatment plan, diagnostic tests, and medications will improve  Intervention: Explain information regarding disease process/condition, treatment plan, diagnostic tests, and medications and document in education  Note: Patient educated on disease process, treatments, medications, and tests. Will continue to educate.

## 2020-05-21 NOTE — CODE DOCUMENTATION
Dr. Gonda at bedside to intubate pt emergently, medications given per code charting.   0307 - + color change, BL breath sounds, STAT CXR ordered, secured at 23 cm at the teeth.

## 2020-05-21 NOTE — PROGRESS NOTES
Pulmonary/Critical Care Medicine   Progress Note    Date of service: 5/21/2020  Time: 0730    I was called to the patient's bedside again this morning for recurrent hypotension, hypoxia and tachycardia despite being on 3 vasopressors.  Patient was in sinus tachycardia with a heart rate of 150 on full mechanical ventilatory support.  She was taken off the ventilator and required bag-valve-mask ventilation to improve her oxygenation.  I added an additional drip including epinephrine and titrated to the maximum dose.  I also increase the bicarb drip and give additional boluses of bicarb.  I gave a gram of IV calcium chloride.  I repeated serial ABGs with electrolytes and hemoglobin.  She does not have any evidence of bleeding nor hyperkalemia.  She continues to make urine.  She remains significantly acidotic with a pH of 6.9 with hyperglycemia.  I gave an additional fluid bolus as well as albumin and her CVP is now up to 13.  I adjusted the ventilator increase in the mandatory minute ventilation and noted her peak inspiratory pressure to be climbing up to 30 from 18.  I have asked pharmacy to maximally concentrate the drips.  I repeated the bedside cardiac ultrasound which again reveals a pericardial effusion and I consulted cardiology who came to bedside and a formal echo was performed which does not show evidence of tamponade physiology.  I have given the patient hydrocortisone as well as IV thiamine and continue to aggressively resuscitate.  Her mother is at bedside and noted discussed the futility of CPR at this point being on maximum vasopressor/inotropic/ventilator support.  Her mother agrees and I change the CODE STATUS to DNR.  Social work is at bedside as well and spiritual support services have been requested.  Dr. Boyle is assuming care of the patient at this time and we spent significant amount of time reviewing the events of the shift, labs, imaging, work-up, treatment.    I spent 25 min in reviewing  the patient's condition, physical examination, laboratory and imaging data, prior documentation, in discussion with RN, RT, pharmacy, family, cardiology, Dr. Boyle, and in formulating an assessment/plan.    Critical Care time: 25 min. No time overlap, procedures not included in time.  [id number]

## 2020-05-21 NOTE — ASSESSMENT & PLAN NOTE
Unclear etiology of this episode (no obvious source of infection at this time), history of recurrent DKA  Continue DKA protocol  Titrate insulin infusion for glycemic control  Continue aggressive fluid resuscitation  Close monitoring of electrolytes, glucose, anion gap, bicarb-every hour blood sugars  Insulin adjusted hourly with RN  Keep n.p.o. for now  Diabetic education

## 2020-05-21 NOTE — PROCEDURES
ROUTINE ELECTROENCEPHALOGRAM REPORT      Referring provider: Dr. Gonda.     DOS: 5/21/2020 (total recording of 25 minutes)    INDICATION:  Alis Sparks 29 y.o. female presenting with cardiac arrest, seizure.     CURRENT ANTIEPILEPTIC REGIMEN: Lorazepam. Propofol infusion paused right before the test.     TECHNIQUE: 30 channel routine electroencephalogram (EEG) was performed in accordance with the international 10-20 system. The study was reviewed in bipolar and referential montages. The recording examined a comatose patient.     DESCRIPTION OF THE RECORD:  Generalized theta / delta activity. No reactivity to stimulation. Did not wake up. No seizures.     ACTIVATION PROCEDURES:   Not performed.     EKG: sampling of the EKG recording demonstrated sinus rhythm.       INTERPRETATION:  This is an abnormal routine EEG recording in a comatose patient. A moderate encephalopathy is suggested. This is non specific. No seizures captured during the study.  Clinical correlation is recommended.    Note: this EEG does not rule out epilepsy.  If the clinical suspicion remains high for seizures, a prolonged recording to capture clinical or subclinical events may be helpful.        Wilian Mortensen MD   Epilepsy and Neurodiagnostics.   Clinical  of Neurology [facility].   Diplomate in Neurology, Epilepsy, and Electrodiagnostic Medicine.   Office: [phone]  Fax: [phone]

## 2020-05-21 NOTE — PROGRESS NOTES
Lab called with critical result of blood sugar 724 at 0754. Critical lab result read back to lab.   Dr. Boyle notified of critical lab result at 0755.  Critical lab result read back by Dr. Boyle. Orders received for insulin gtt.

## 2020-05-21 NOTE — CARE PLAN
Problem: Safety  Goal: Will remain free from injury  Outcome: PROGRESSING AS EXPECTED  Note: Fall safety precautions in place, hourly rounding performed.      Problem: Venous Thromboembolism (VTW)/Deep Vein Thrombosis (DVT) Prevention:  Goal: Patient will participate in Venous Thrombosis (VTE)/Deep Vein Thrombosis (DVT)Prevention Measures  Outcome: PROGRESSING AS EXPECTED  Flowsheets  Taken 5/21/2020 0400 by Destiney Barrera R.N.  Mechanical Prophylaxis: SCDs, Sequential Compression Device  Taken 5/21/2020 1449 by Julia Munoz RHollisN.  SCDs, Sequential Compression Device: On

## 2020-05-21 NOTE — H&P
Hospital Medicine History & Physical Note    Date of Service  5/21/2020    Primary Care Physician  Bruna Laboy M.D.    Consultants  Dr. Gonda, pulmonary medicine    Code Status  Full    Chief Complaint  Chief Complaint   Patient presents with   • N/V     since 1000; type 1 DM   • Hyperglycemia       History of Presenting Illness  29 y.o. female who presented on 5/20/2020 with nausea, vomiting, and elevated blood sugars.  This is a young female with type 1 diabetes mellitus who is well-known to this hospital for poorly controlled diabetes mellitus with gastroparesis and multiple hospital admissions for DKA.  She was last admitted to this facility in March for hyperglycemia with gastroparesis by the [place] internal medicine residency team and discharged on March 23.  Since she has been home, she had been well until yesterday morning when she began to experience nausea associated abdominal cramps and intractable vomiting which the patient describes as similar to her usual bouts of gastroparesis.  She has had associated body aches but denies any fevers, chills, cough, or sick contacts that she is aware of.  Patient has been checking her blood sugars at home and states that they have been reading high and they have been difficult to control.  Upon arrival in the emergency room, patient is noted to be in DKA.    Review of Systems  Review of Systems   Constitutional: Negative for chills and fever.   HENT: Negative for congestion and sore throat.    Eyes: Negative for photophobia.   Respiratory: Negative for cough, shortness of breath and wheezing.    Cardiovascular: Negative for chest pain and palpitations.   Gastrointestinal: Positive for nausea and vomiting. Negative for abdominal pain and diarrhea.   Genitourinary: Negative for dysuria.   Musculoskeletal: Positive for myalgias.   Skin: Negative.    Neurological: Negative for dizziness, tingling, focal weakness and headaches.   Psychiatric/Behavioral: Negative  for depression and suicidal ideas.       Past Medical History  Past Medical History:   Diagnosis Date   • Backpain    • Bronchitis    • Diabetes type 1, controlled ([facility])     tests 4-5 times daily   • DKA (diabetic ketoacidoses) ([facility])    • Fall    • Gastroparesis    • Kidney infection    • Pneumonia    • UTI (urinary tract infection)        Surgical History  Past Surgical History:   Procedure Laterality Date   • GASTROSCOPY-ENDO N/A 6/9/2018    Procedure: GASTROSCOPY-ENDO WITH BIOPSIES AND DIALATION;  Surgeon: Marino Black M.D.;  Location: SURGERY [facility];  Service: Gastroenterology   • SUBMANDIBLE ABSCESS INCISION AND DRAINAGE Left 11/8/2017    Procedure: SUBMANDIBLE ABSCESS INCISION AND DRAINAGE;  Surgeon: Osman Young M.D.;  Location: SURGERY [facility];  Service: Dental   • DENTAL EXTRACTION(S)  11/8/2017    Procedure: DENTAL EXTRACTION(S);  Surgeon: Osman Young M.D.;  Location: SURGERY [facility];  Service: Dental   • GASTROSCOPY-ENDO  9/18/2014    Performed by Sylvain PERRY M.D. at ENDOSCOPY [facility]   • GASTROSCOPY WITH BIOPSY  9/18/2014    Performed by Sylvain PERRY M.D. at ENDOSCOPY [facility]   • OTHER      surgery to patch lung after pneumor from central line placement       Family History  Family History   Problem Relation Age of Onset   • Cancer Other         breasts, multiple family members   • Hypertension Maternal Grandfather        Social History  Social History     Tobacco Use   • Smoking status: Never Smoker   • Smokeless tobacco: Never Used   Substance Use Topics   • Alcohol use: No   • Drug use: No       Allergies  Allergies   Allergen Reactions   • Pcn [Penicillins] Shortness of Breath and Swelling     Per patient's Mom, patient tolerates Keflex   • Lisinopril Unspecified     Per historical: Reported pedal swelling. No facial/angioedema or rash nor respiratory symptoms.    • Promethazine Vomiting       Medications  No current facility-administered  medications on file prior to encounter.      Current Outpatient Medications on File Prior to Encounter   Medication Sig Dispense Refill   • insulin glargine (BASAGLAR KWIKPEN) 100 UNIT/ML Solution Pen-injector injection Inject 30 Units as instructed 2 Times a Day. 1 PEN 2   • metoclopramide (REGLAN) 10 MG Tab Take 1 Tab by mouth 4 times a day. 120 Tab 0   • omeprazole (PRILOSEC) 20 MG delayed-release capsule Take 1 Cap by mouth every day. 30 Cap 0   • insulin lispro, Human, (ADMELOG SOLOSTAR) 100 UNIT/ML Solution Pen-injector injection Inject 8 Units as instructed 3 times a day before meals. 5 PEN 0   • ferrous sulfate 325 (65 Fe) MG tablet Take 1 Tab by mouth every day. 30 Tab 0   • ascorbic acid (VITAMIN C) 500 MG tablet Take 1 Tab by mouth every day. 30 Tab 0       Physical Exam  Hemodynamics  Temp (24hrs), Av.9 °C (98.4 °F), Min:36.9 °C (98.4 °F), Max:36.9 °C (98.4 °F)   Temperature: 36.9 °C (98.4 °F)  Pulse  Av.8  Min: 123  Max: 133    Blood Pressure: 109/59      Respiratory      Respiration: (!) 29, Pulse Oximetry: 99 %             Physical Exam   Constitutional: No distress.   HENT:   Head: Normocephalic and atraumatic.   Right Ear: External ear normal.   Left Ear: External ear normal.   Eyes: EOM are normal. Right eye exhibits no discharge. Left eye exhibits no discharge.   Neck: Neck supple. No JVD present.   Cardiovascular: Normal rate, regular rhythm and normal heart sounds.   Pulmonary/Chest: Breath sounds normal. No respiratory distress. She exhibits no tenderness.   Kusmaul breathing   Abdominal: Soft. Bowel sounds are normal. She exhibits no distension. There is no abdominal tenderness.   Musculoskeletal: Normal range of motion.         General: No edema.   Neurological: She is alert. No cranial nerve deficit.   Poor historian   Skin: Skin is warm and dry. She is not diaphoretic. No erythema.   Nursing note and vitals reviewed.    Capillary refill less than 3 seconds, distal pulses  intact    Laboratory:  Recent Labs     05/20/20 2106   WBC 8.3   RBC 4.57   HEMOGLOBIN 13.0   HEMATOCRIT 40.1   MCV 87.7   MCH 28.4   MCHC 32.4*   RDW 47.8   PLATELETCT 284   MPV 10.6     Recent Labs     05/20/20 2106   SODIUM 138   POTASSIUM 4.5   CHLORIDE 99   CO2 6*   GLUCOSE 726*   BUN 16   CREATININE 0.88   CALCIUM 11.4*     Recent Labs     05/20/20 2106   ALTSGPT 24   ASTSGOT 21   ALKPHOSPHAT 132*   TBILIRUBIN 0.7   LIPASE 10*   GLUCOSE 726*                 Lab Results   Component Value Date    TROPONINI <0.01 07/27/2017       Imaging  No results found.      Assessment/Plan:  Anticipate that patient will need greater than 2 midnights for management of the discussed medical issues.    * DKA (diabetic ketoacidoses) ([facility])  Assessment & Plan  Patient has received 3 L of IV fluids in emergency room, I will provide her with 3 additional liters after which point I will consider her fluid resuscitated and transition to maintenance fluids and start an insulin drip.  She will be monitored closely in the ICU with every 4 hour BMPs to ensure correction of her hypocapnia and closure of her anion gap.  Patient will be kept n.p.o. for now.    Diabetes type I (CMS-HCC)- (present on admission)  Assessment & Plan  Treatment as noted above.    Gastroparesis- (present on admission)  Assessment & Plan  Symptomatic management.      Prophylaxis: Heparin for DVT prophylaxis, home PPI indicated, bowel protocol as needed

## 2020-05-21 NOTE — PROGRESS NOTES
"Pt arrival to S119 with ACLS RN and CCT. Pt vital signs are as stated in flowsheets. See flowsheets. Insulin running at 4units/hr. FSBS on admit: \"HI\". Pt belongings placed in closet.    2 RN skin check with Destiney CHAVIRA and Kenia RN:    Devices in place: EKG leads and wires, BP cuff, SPO2 probe, L chest port, PIVx1, SCDs to bilateral lower extremities    Devices repositioned q2hr and prn. Skin assessed under devices.    Areas of concern:  -Red raised rash on L side of neck    Preventative measures:  -q2hr turns with use of pillows to support repositioning  -CHG bath  -Sheet change    Wound found: no  Wound consult placed: no  Appropriate LDAs open in flowsheets: yes  "

## 2020-05-21 NOTE — DISCHARGE PLANNING
LSW received verbal hand off of over night events from ED Tiara STODDARD. LSW met pt's mother, Flaquito Sanders, and her friend who is here for support. Assessment completed from chart review.     Plan: Remain available to assist & support family during this time.    Care Transition Team Assessment    Information Source  Orientation : Unable to Assess  Information Given By: Parent  Informant's Name: Flaquito Sanders  Who is responsible for making decisions for patient? : Legal next of kin  Name(s) of Primary Decision Maker: Mother    Readmission Evaluation  Is this a readmission?: No    Elopement Risk  Legal Hold: No  Ambulatory or Self Mobile in Wheelchair: No-Not an Elopement Risk  Elopement Risk: Not at Risk for Elopement    Discharge Preparedness  What is your plan after discharge?: Uncertain - pending medical team collaboration  What are your discharge supports?: Parent  Prior Functional Level: Independent with Activities of Daily Living, Independent with Medication Management    Functional Assesment  Prior Functional Level: Independent with Activities of Daily Living, Independent with Medication Management    Finances  Financial Barriers to Discharge: No  Prescription Coverage: Yes    Domestic Abuse  Have you ever been the victim of abuse or violence?: No    Psychological Assessment  History of Substance Abuse: None  History of Psychiatric Problems: No  Non-compliant with Treatment: No  Newly Diagnosed Illness: No    Discharge Risks or Barriers  Discharge risks or barriers?: Complex medical needs    Anticipated Discharge Information  Anticipated discharge disposition: Discharge needs currently unknown

## 2020-05-21 NOTE — PROGRESS NOTES
Arrived to Red 2 with ACLS nurse and tech. Patient lethargic x3. Only answering with one word answers. ED nurse said she was previously lethargic x4. Patient was hard to arouse but would open eyes. Patient having kussmual breathing on RA in ED room. SPO2 100% RR 30 at that time. Patient hooked up to monitor and taken to S-119.

## 2020-05-21 NOTE — PROGRESS NOTES
RN contacted Reena Schwartz at [facility] to notify of pt's death. Pt is not a 's case.     1050 RN contacted Kuldeep at [facility] to notify of pt's death. DNW will attempt to contact family at a more appropriate time regarding possible tissue donation.

## 2020-05-21 NOTE — PROCEDURES
Procedure Note    Date: 5/21/2020  Time: 0445    Procedure: Arterial Line placement  Site: R radial artery    Indication: Shock requiring continuous BP monitoring, frequent ABG monitoring  Consent: Informed consent obtained from patient or designated decision maker after explaining the benefits/risks of the procedure including but not limited to bleeding, infection, nerve or other deep structure injury, or failure of placement. Patient or surrogate expressed understanding and agreement.    Procedure: After obtaining consent, a time-out was performed. An Urban's test was performed to test for adequate collateral circulation. Patient positioned, prepped, and draped in sterile fashion.  0 mL of local anesthetic injected (1% lidocaine without epinephrine) achieving appropriate comfort level for patient. Artery was located using physical exam. A 18 gauge catheter was placed using Seldinger technique. Appropriate arterial blood visualized from the catheter and the arterial waveform confirmed on the monitor. Line secured and dressed in place. Patient tolerated procedure well without any difficulties and left in care of bedside nurse.     EBL: minimal  Complications: none    Jeremy Gonda, MD  Critical Care Medicine

## 2020-05-21 NOTE — PROGRESS NOTES
RCC    I was in the process of updating the patient's mother with an elder from their [employer] who is here to administer the sacrament of the sick when the patient developed bradycardia and then asystole.  She was pronounced dead at 1011 on 5/21/2020.  I subsequently spoke with the charge nurse and case management and we are getting the patient's step father in immediately to comfort his wife his is very distraught.  Case management is coming to help as well.

## 2020-05-21 NOTE — ED PROVIDER NOTES
ED Provider Note    CHIEF COMPLAINT  Chief Complaint   Patient presents with   • N/V     since 1000; type 1 DM   • Hyperglycemia       HPI  Alis Sparks is a 29 y.o. female who presents to the emergency department for elevated blood sugar.  History of recurrent DKA.  Recently hospitalized 5 months ago for the same.  She noticed that she started to feel unwell this afternoon around 1 PM.  She started with nausea now has abdominal cramping persistent vomiting and feeling generally unwell.  No other known inciting events.  She noted that she did have some midepigastric right upper quadrant pain starting last night.  LMP was on time and normal.  Does not believe she is pregnant  No fever chills.  No urinary symptoms.     REVIEW OF SYSTEMS  See HPI for further details. All other systems are negative.     PAST MEDICAL HISTORY   has a past medical history of Backpain, Bronchitis, Diabetes type 1, controlled (HCC), DKA (diabetic ketoacidoses) (HCC), Fall, Gastroparesis, Kidney infection, Pneumonia, and UTI (urinary tract infection).    SOCIAL HISTORY  Social History     Tobacco Use   • Smoking status: Never Smoker   • Smokeless tobacco: Never Used   Substance and Sexual Activity   • Alcohol use: No   • Drug use: No   • Sexual activity: Never       SURGICAL HISTORY   has a past surgical history that includes gastroscopy-endo (9/18/2014); gastroscopy with biopsy (9/18/2014); other; submandible abscess incision and drainage (Left, 11/8/2017); dental extraction(s) (11/8/2017); and gastroscopy-endo (N/A, 6/9/2018).    CURRENT MEDICATIONS  Home Medications    **Home medications have not yet been reviewed for this encounter**         ALLERGIES  Allergies   Allergen Reactions   • Pcn [Penicillins] Shortness of Breath and Swelling     Per patient's Mom, patient tolerates Keflex   • Lisinopril Unspecified     Per historical: Reported pedal swelling. No facial/angioedema or rash nor respiratory symptoms.    • Promethazine  "Vomiting       PHYSICAL EXAM  VITAL SIGNS: /59   Pulse (!) 133   Temp 36.9 °C (98.4 °F) (Oral)   Resp (!) 29   Ht 1.651 m (5' 5\")   Wt 79.4 kg (175 lb)   LMP 2020 (Approximate)   SpO2 99%   BMI 29.12 kg/m²  @AMBROSIO[[id number]::@   Pulse ox interpretation: I interpret this pulse ox as normal.  Constitutional: Alert, holding emesis bag vomiting, smell of ketones  HENT: No signs of trauma, Bilateral external ears normal, Nose normal.   Eyes: Pupils are equal and reactive, Conjunctiva normal, Non-icteric.   Neck: Normal range of motion, No tenderness, Supple, No stridor.   Cardiovascular: Tachycardic, regular rate and rhythm, no murmurs.   Thorax & Lungs: Normal breath sounds, No respiratory distress, No wheezing, No chest tenderness.   Abdomen: Bowel sounds normal, Soft, mild right upper quadrant/midepigastric tenderness, No masses, No pulsatile masses. No peritoneal signs.  Skin: Warm, Dry, No erythema, No rash.   Back: No bony tenderness, No CVA tenderness.   Extremities: Intact distal pulses, No edema, No tenderness, No cyanosis  Musculoskeletal: Good range of motion in all major joints. No tenderness to palpation or major deformities noted.   Neurologic: Alert , Normal motor function, Normal sensory function, No focal deficits noted.   Psychiatric: Affect normal, Judgment normal, Mood normal.       DIAGNOSTIC STUDIES / PROCEDURES    EKG  Results for orders placed or performed during the hospital encounter of 20   EKG   Result Value Ref Range    Report       [facility] Emergency Dept.    Test Date:  2020  Pt Name:    AMANDA BRANCH            Department: ER  MRN:        [id number]                      Room:       RD 12  Gender:     Female                       Technician: [id number]  :        1990                   Requested By:ER TRIAGE PROTOCOL  Order #:    [id number]                    Reading MD:    Measurements  Intervals                                Axis  Rate: "       126                          P:          56  KY:         140                          QRS:        37  QRSD:       68                           T:          56  QT:         300  QTc:        435    Interpretive Statements  SINUS TACHYCARDIA  NONSPECIFIC T ABNORMALITIES, ANT-LAT LEADS  BASELINE WANDER IN LEAD(S) I,III,aVR,aVL  Compared to ECG 03/22/2020 17:48:01  T-wave abnormality now present  Sinus rhythm no longer present           LABS  Results for orders placed or performed during the hospital encounter of 05/20/20   CBC WITH DIFFERENTIAL   Result Value Ref Range    WBC 8.3 4.8 - 10.8 K/uL    RBC 4.57 4.20 - 5.40 M/uL    Hemoglobin 13.0 12.0 - 16.0 g/dL    Hematocrit 40.1 37.0 - 47.0 %    MCV 87.7 81.4 - 97.8 fL    MCH 28.4 27.0 - 33.0 pg    MCHC 32.4 (L) 33.6 - 35.0 g/dL    RDW 47.8 35.9 - 50.0 fL    Platelet Count 284 164 - 446 K/uL    MPV 10.6 9.0 - 12.9 fL    Neutrophils-Polys 66.70 44.00 - 72.00 %    Lymphocytes 26.50 22.00 - 41.00 %    Monocytes 5.20 0.00 - 13.40 %    Eosinophils 0.60 0.00 - 6.90 %    Basophils 0.50 0.00 - 1.80 %    Immature Granulocytes 0.50 0.00 - 0.90 %    Nucleated RBC 0.00 /100 WBC    Neutrophils (Absolute) 5.53 2.00 - 7.15 K/uL    Lymphs (Absolute) 2.20 1.00 - 4.80 K/uL    Monos (Absolute) 0.43 0.00 - 0.85 K/uL    Eos (Absolute) 0.05 0.00 - 0.51 K/uL    Baso (Absolute) 0.04 0.00 - 0.12 K/uL    Immature Granulocytes (abs) 0.04 0.00 - 0.11 K/uL    NRBC (Absolute) 0.00 K/uL   COMP METABOLIC PANEL   Result Value Ref Range    Sodium 138 135 - 145 mmol/L    Potassium 4.5 3.6 - 5.5 mmol/L    Chloride 99 96 - 112 mmol/L    Co2 6 (LL) 20 - 33 mmol/L    Anion Gap 33.0 (H) 7.0 - 16.0    Glucose 726 (HH) 65 - 99 mg/dL    Bun 16 8 - 22 mg/dL    Creatinine 0.88 0.50 - 1.40 mg/dL    Calcium 11.4 (H) 8.5 - 10.5 mg/dL    AST(SGOT) 21 12 - 45 U/L    ALT(SGPT) 24 2 - 50 U/L    Alkaline Phosphatase 132 (H) 30 - 99 U/L    Total Bilirubin 0.7 0.1 - 1.5 mg/dL    Albumin 3.9 3.2 - 4.9 g/dL    Total  Protein 6.3 6.0 - 8.2 g/dL    Globulin 2.4 1.9 - 3.5 g/dL    A-G Ratio 1.6 g/dL   HCG Qual Serum   Result Value Ref Range    Beta-Hcg Qualitative Serum Negative Negative   LIPASE   Result Value Ref Range    Lipase 10 (L) 11 - 82 U/L   MAGNESIUM   Result Value Ref Range    Magnesium 1.7 1.5 - 2.5 mg/dL   PHOSPHORUS   Result Value Ref Range    Phosphorus 4.2 2.5 - 4.5 mg/dL   BETA-HYDROXYBUTYRIC ACID   Result Value Ref Range    beta-Hydroxybutyric Acid >8.00 (H) 0.02 - 0.27 mmol/L   OSMOLALITY SERUM   Result Value Ref Range    Osmolality Serum 331 (H) 278 - 298 mOsm/kg H2O   ESTIMATED GFR   Result Value Ref Range    GFR If African American >60 >60 mL/min/1.73 m 2    GFR If Non African American >60 >60 mL/min/1.73 m 2   ACCU-CHEK GLUCOSE   Result Value Ref Range    Glucose - Accu-Ck >600 (HH) 65 - 99 mg/dL   EKG   Result Value Ref Range    Report       [facility] Emergency Dept.    Test Date:  2020  Pt Name:    AMANDA BRANCH            Department: ER  MRN:        [id number]                      Room:        12  Gender:     Female                       Technician: [id number]  :        1990                   Requested By:ER TRIAGE PROTOCOL  Order #:    [id number]                    Reading MD:    Measurements  Intervals                                Axis  Rate:       126                          P:          56  DC:         140                          QRS:        37  QRSD:       68                           T:          56  QT:         300  QTc:        435    Interpretive Statements  SINUS TACHYCARDIA  NONSPECIFIC T ABNORMALITIES, ANT-LAT LEADS  BASELINE WANDER IN LEAD(S) I,III,aVR,aVL  Compared to ECG 2020 17:48:01  T-wave abnormality now present  Sinus rhythm no longer present           RADIOLOGY  US-RUQ    (Results Pending)     CRITICAL CARE  The very real possibilty of a deterioration of this patient's condition required the highest level of my preparedness for sudden, emergent  intervention.  I provided critical care services, which included medication orders, frequent reevaluations of the patient's condition and response to treatment, ordering and reviewing test results, and discussing the case with various consultants.  The critical care time associated with the care of the patient was 35 minutes. Review chart for interventions. This time is exclusive of any other billable procedures.       COURSE & MEDICAL DECISION MAKING  Pertinent Labs & Imaging studies reviewed. (See chart for details)  29-year-old female presenting to the emergency department with recurrent DKA.  History as above.  Clinical decline starting this afternoon per the patient's history.  Lab evaluation correlates with clinical evaluation and suspicion for DKA.  Patient has been given aggressive IV fluids with total of 3 L and then started on insulin drip.  I discussed case with Dr. Gavin on-call for the hospital service as well as Dr. Gonda on-call for the intensivist team.  Patient again will be brought into the hospital at this time for ongoing DKA management and care.      HYDRATION: Based on the patient's presentation of DKA the patient was given IV fluids. IV Hydration was used because oral hydration was not adequate alone. Upon recheck following hydration, the patient was needing ongoing care and hydration.      FINAL IMPRESSION  1. Diabetic ketoacidosis without coma associated with diabetes mellitus due to underlying condition (HCC)            Electronically signed by: Hi Valentin M.D., 5/20/2020 9:38 PM

## 2020-05-21 NOTE — PROGRESS NOTES
Called lab to run BMP STAT since patient actively coding. Lab stated blood had clotted and would take about 10 minutes.

## 2020-05-21 NOTE — PROCEDURES
Procedure Note    Date: 5/21/2020  Time: 0300    Procedure: Intubation    Indication: Acute respiratory failure, cardiac arrest  Consent: Emergency/implied    Procedure: A time-out was performed. Airway assessed and patient found to have Mallampati class 2.  Equipment prepared and RT/RN at bedside. Patient pre-oxygenated with 100% FiO2.  After medication delivery, a 4.0 Mac blade used to acheive direct visualization and a grade 1 view. A 7.5 ETT was placed with 1 attempt(s) into the trachea directly through the vocal cords. Appropriate placement confirmed by direct visualization, bilateral chest and epigastric auscultation, misting in the ETT, and ETCO2 detector. ETT secured in place at 23 cm at the teeth and patient connected to ventilator. Sedation/analgesia continued as appropriate. Patient tolerated procedure well without any difficulties and remains in care of bedside nurse and respiratory therapy. CXR will be performed to confirm appropriate placement of ETT.    Medications: etomidate 20 mg, rocuronium 100 mg  Complications: none  CXR: appropriately positioned tip of ETT, no PTX    Jeremy Gonda, MD  Critical Care Medicine

## 2020-05-21 NOTE — PROGRESS NOTES
Pt's SpO2 saturation in the 50's, Dr. Boyle notified.  RN retrieved family and  from waiting room after EEG complete. MD to come to bedside to discuss POC with family.

## 2020-05-21 NOTE — PROGRESS NOTES
0530- Dr. Gonda paged for patient's HR in 150s and O2 sats at 85%.     0534- Code cart brought to bedside.    0536- Dr. Gonda at bedside.    0538- RT at bedside to bag patient. 1 amp of Bicarb given, Calcium Chloride given, and 500mcg Gilberto push given (see MAR).     0540- Propofol gtt paused, Levo gtt increased to 40 mcg/min (see MAR).     0541- Bicarb gtt increased to 150 mL/hr (see MAR).    0542- Dr. Gonda performing ultrasound.    0543- Pharmacist Argos at bedside.     0544- Cardiology paged.    0547- Cardiologist Dr. Longoria returned page and spoke with Dr. Gonda.    0548- 20 mcg of EPI given IV push (see MAR).    0550- Dr. Longoria at bedside to review ultrasound.     0555- 20 mcg of Epi given IV push (see MAR).    0556- Called for stat echo.    0557- 1g calcium chloride given (see MAR).     0600- Patient PERRL at this time.    0601- Epi gtt started at 5 mcg/min (see MAR). FSBS- 493    0606- New bag of Gilberto hung. Dr. Gonda placing central line at bedside.    0608- Called for stat CXR    0617- 30 mcg of Epi given IV push (see MAR).    0620- FiO2 increased to 100%    0621- 30 mcg of Epi given IV push (see MAR).     0622- Gilberto gtt increased to 400 mcg/min (see MAR).    0623- Chest x-ray completed at bedside.    0634- Echo completed at bedside.    0635- 1 amp of Bicarb given (see MAR).    0638- Solu-medrol given (see MAR).    0710- 1 amp bicarb given and  (see MAR).    0711- 10 mg vecuronium given (see MAR).     0712- Hand off report given by Destiney CHAVIRA to Julia CHAVIRA at bedside.

## 2020-05-21 NOTE — DISCHARGE PLANNING
Medical Social Work     Referral: Code Blue    RICHI responded to a code blue. The pt name is Alis Sparks (: 1990). RICHI called the pt mother Flaquito Sanders at [phone] and advised her of the medical staff doing CPR. Flaquito asked if she is able to come down to [facility]. SW advised her that she is able to come down. RICHI met with the pt mother Flaquito and escorted her to the SICU. The critical care MD updated the pt mother and SW escorted Flaquito to the pt room. SW provided emotional support. SW continued to provided support throughout the night. RICHI introduced the unit SW to the pts family.     Plan: SW will continue to provided support to the pt and family.

## 2020-05-21 NOTE — PROGRESS NOTES
"Critical Care Progress Note    Date of admission  5/20/2020    Chief Complaint  29 y.o. female admitted 5/20/2020 with DKA and subsequently had a PEA arrest early AM 5/21    Hospital Course    29 y.o. female who presented 5/20/2020 with h/o poorly controlled IDDM with recurrent DKA (most recently 5 months ago) with 1 day of N/V, diffuse abd pain and elevated glucose when checked at home.  Patient states yesterday her blood sugars were in the 200-300s which is in a \"medium range\" per the patient.  She denies any recent travel, sick contacts, abnormal food or drink, alcohol, drugs, pregnancy.  She reports compliance with her insulin therapy at home.  In the emergency department patient was found to be in diabetic ketoacidosis and was started on an insulin drip, given aggressive fluid resuscitation and will be admitted to intensive care unit for which I was consulted for critical care management.     Mother provides additional information the patient has been \"doing really well lately with her diet, medication administration and caring for herself.\"  The last few days however she took off of work because she felt \"rundown\" and her sugars have been high.  Mom does not know of any sick contacts nor other precipitating events.      Interval Problem Update  Reviewed last 24 hour events:    See Dr Gonda's full note    Patient with persistent hypoxemia and hypotension  Case reviewed at length at the bedside with Dr. Gonda with extensive checkout rounds  Patient is unresponsive on the ventilator on low-dose fentanyl for analgesia/sedation  Sinus tach 120-150s  Systolic BP 70-90  CVP low teens  Lactic acid 4.3  Bicarb 10, anion gap 28  Norepinephrine 30  Epinephrine 10  Gilberto-Synephrine 300  Vasopressin 0.03  Bicarb drip 150 mL  Normal saline 125 cc/h  5% albumin infusion ongoing  Urine output acceptable initially  I/O's +3.68 L  T-max one 1.8  Blood cultures pending  WBC 23.5  Ceftriaxone/metronidazole  Insulin drip 4 " units/h  Blood sugars reviewed with RN, 600-700  Solu-Cortef  IV calcium  Ventilator mechanics and waveforms are reviewed  PEEP 12, I time 0.6 seconds, FiO2 1.0  No air trapping and waveforms, PIP 31  Chest x-ray bilateral diffuse opacities  CTA no PE with predominantly bibasilar opacities  Transaminases elevated  Platelet count grossly normal as well as TEG with platelet mapping    Case reviewed the bedside with the patient's mother times several    Bedside cardiac ultrasound was performed which revealed persisting small IVC, small pericardial effusion and normal LV function with acceptable RV filling, earlier that was a problem I believe by report, cardiology reviewed bedside ultrasound with me as I was performing it  Multiple ABGs reviewed, significant metabolic acidosis noted  IV sodium bicarb 1 amp administered and blood pressure significantly improved, this was repeated times several  Bicarb drip was titrated upwards and saline drip discontinued  Reviewed with pharmacy, all infusions were max concentrated  Ventilator tidal volume, I time and respiratory rate were adjusted to maximize a pain PIP  Vasopressin increased to 0.4  Norepinephrine titrated upwards to 40-50 range  Gilberto-Synephrine titrated up to 500-600 range  Epinephrine titrated up to 15  Serial ABGs revealed acceptable calcium levels, calcium had been given earlier  IV Solu-Cortef given earlier and IV thiamine administered  EEG was performed  Initially pupils were 5 to 6 mm but responsive bilaterally approximately 10 AM they were in the 7 to 8 mm range and nonresponsive    Review of Systems  Review of Systems   Unable to perform ROS: Acuity of condition   Intubated     Vital Signs for last 24 hours   Temp:  [36.1 °C (97 °F)-36.9 °C (98.4 °F)] 36.1 °C (97 °F)  Pulse:  [0-153] 0  Resp:  [0-41] 36  BP: ()/(42-76) 75/52  SpO2:  [56 %-100 %] 56 %    Hemodynamic parameters for last 24 hours  CVP:  [15 MM HG-297 MM HG] 15 MM HG    Respiratory  Information for the last 24 hours  Vent Mode: APVCMV  Rate (breaths/min): 36  Vt Target (mL): 400  PEEP/CPAP: 12  MAP: 18  Control VTE (exp VT): 397    Physical Exam   Physical Exam  Vitals signs reviewed.   Constitutional:       General: She is in acute distress.      Appearance: She is normal weight. She is toxic-appearing.      Interventions: She is sedated and intubated.   HENT:      Head: Normocephalic and atraumatic.      Mouth/Throat:      Mouth: Mucous membranes are moist.   Eyes:      General: No scleral icterus.     Pupils: Pupils are equal, round, and reactive to light.   Neck:      Musculoskeletal: Neck supple. No neck rigidity.   Cardiovascular:      Rate and Rhythm: Regular rhythm. Tachycardia present.  No extrasystoles are present.     Pulses: Normal pulses.      Heart sounds: No murmur.      Comments: Sinus tachycardia  Pulmonary:      Effort: She is intubated.      Breath sounds: Rhonchi and rales present. No wheezing.   Abdominal:      General: Abdomen is protuberant. Bowel sounds are decreased.      Palpations: Abdomen is soft. There is no fluid wave, hepatomegaly, splenomegaly or mass.      Tenderness: There is no guarding.   Lymphadenopathy:      Cervical: No cervical adenopathy.   Skin:     General: Skin is cool and dry.      Capillary Refill: Capillary refill takes 2 to 3 seconds.      Coloration: Skin is not cyanotic.      Nails: There is no clubbing.     Neurological:      Mental Status: She is unresponsive.      GCS: GCS eye subscore is 1. GCS verbal subscore is 1. GCS motor subscore is 1.      Comments: He was initially moderately dilated and sluggish but responsive, subsequently her pupils were fixed and dilated, no spontaneous activity except for respirations   Psychiatric:      Comments: Unable to assess         Medications  No current facility-administered medications for this encounter.      Current Outpatient Medications   Medication Sig Dispense Refill   • insulin glargine (BASAGLAR  KWIKPEN) 100 UNIT/ML Solution Pen-injector injection Inject 30 Units as instructed 2 Times a Day. 1 PEN 2   • metoclopramide (REGLAN) 10 MG Tab Take 1 Tab by mouth 4 times a day. 120 Tab 0   • omeprazole (PRILOSEC) 20 MG delayed-release capsule Take 1 Cap by mouth every day. 30 Cap 0   • insulin lispro, Human, (ADMELOG SOLOSTAR) 100 UNIT/ML Solution Pen-injector injection Inject 8 Units as instructed 3 times a day before meals. 5 PEN 0   • ferrous sulfate 325 (65 Fe) MG tablet Take 1 Tab by mouth every day. 30 Tab 0   • ascorbic acid (VITAMIN C) 500 MG tablet Take 1 Tab by mouth every day. 30 Tab 0       Fluids    Intake/Output Summary (Last 24 hours) at 5/21/2020 2001  Last data filed at 5/21/2020 1015  Gross per 24 hour   Intake 78387.47 ml   Output 1250 ml   Net 36264.47 ml       Laboratory  Recent Labs     05/21/20  0710 05/21/20  0808 05/21/20  0922   ISTATAPH 6.979* 7.140* 7.020*   ISTATAPCO2 53.2* 45.9* 46.8*   ISTATAPO2 56* 42* 41*   ISTATATCO2 14* 17* 13*   [id number] 70* 62* 53*   ISTATARTHCO3 12.5* 15.6* 12.1*   ISTATARTBE -19* -13* -19*   ISTATTEMP 99.0 F 100.2 F 101.3 F   ISTATFIO2 100 100 100   ISTATSPEC Arterial Arterial Arterial   ISTATAPHTC 6.976* 7.128* 7.002*   [id number] 57* 44* 46*         Recent Labs     05/20/20  2106 05/21/20  0155 05/21/20  0555   SODIUM 138 144 150*   POTASSIUM 4.5 5.0 4.6   CHLORIDE 99 103 112   CO2 6* 3* 10*   BUN 16 21 19   CREATININE 0.88 1.01 1.05   MAGNESIUM 1.7 1.8 1.8   PHOSPHORUS 4.2 6.7* 7.2*   CALCIUM 11.4* 10.1 8.4*     Recent Labs     05/20/20  2106 05/21/20  0555 05/21/20  0711 05/21/20  0746   ALTSGPT 24  --  850*  --   --    ASTSGOT 21  --  3685*  --   --    ALKPHOSPHAT 132*  --  170*  --   --    TBILIRUBIN 0.7  --  0.2  --   --    LIPASE 10*  --   --   --   --    GLUCOSE 726*   < > 610* 724* 721*    < > = values in this interval not displayed.     Recent Labs     05/20/20  2106 05/21/20  0555   WBC 8.3 23.5*   NEUTSPOLYS 66.70 65.50   LYMPHOCYTES 26.50  20.70*   MONOCYTES 5.20 1.70   EOSINOPHILS 0.60 0.90   BASOPHILS 0.50 0.00   ASTSGOT 21 3685*   ALTSGPT 24 850*   ALKPHOSPHAT 132* 170*   TBILIRUBIN 0.7 0.2     Recent Labs     05/20/20  2106 05/21/20  0555 05/21/20  0600   RBC 4.57 5.18  --    HEMOGLOBIN 13.0 14.7  --    HEMATOCRIT 40.1 47.3*  --    PLATELETCT 284 508*  --    PROTHROMBTM  --   --  15.9*   APTT  --   --  21.4*   INR  --   --  1.24*       Imaging  X-Ray:  I have personally reviewed the images and compared with prior images.  EKG:  I have personally reviewed the images and compared with prior images.  CT:    Reviewed  Echo:   Reviewed    Assessment/Plan  * Shock (HCC)  Assessment & Plan  Indeterminate, probably multifactorial  Major contributors include severe metabolic acidosis, significant hypovolemia  Cardiac imaging did not suggest pulmonary embolus and CTA was negative  Cardiac imaging did not reveal significant left ventricular dysfunction or significant pericardial effusion  EKG did not reveal changes suggestive of a myocardial ischemic process  No history of any form of ingestion that we are aware of  No clear history to suggest infection, patient pancultured and started on broad-spectrum antibiotics empirically  Serial exam/volume reassessment with CVP monitoring and cardiac ultrasound at the bedside ongoing  Formal echocardiogram noted, reviewed with cardiology as well as my associate Dr. Gonda  Correction of metabolic acidosis with intravenous bicarb  Actively titrating for pressors including vasopressin, norepinephrine, epinephrine and Gilberto-Synephrine  Solu-Cortef and thiamine supplementation along with monitoring of on ice calcium levels and supplementation of calcium as needed      Cardiac arrest (HCC)  Assessment & Plan  Status post PEA arrest with 5 minutes CPR before ROSC  Required intubation post event  Severe shock followed  See full code note    Acute respiratory failure with hypoxia (HCC)  Assessment & Plan  Intubated a.m. 5/21  RT  protocols  Ventilator bundle  Serial ABG/chest x-ray/ventilator mechanics review  Severe hypoxemia with wide AA gradient and severe metabolic acidosis  Titrate Itime, tidal volume, PEEP and FiO2  Trial paralytic, one-time dose of vecuronium  Would consider pronating patient for hypoxemia if severe hemodynamic instability improves  Inappropriate for SAT or SBT at this time    DKA (diabetic ketoacidoses) ([facility])  Assessment & Plan  Unclear etiology of this episode (no obvious source of infection at this time), history of recurrent DKA  Continue DKA protocol  Titrate insulin infusion for glycemic control  Continue aggressive fluid resuscitation  Close monitoring of electrolytes, glucose, anion gap, bicarb-every hour blood sugars  Insulin adjusted hourly with RN  Keep n.p.o. for now  Diabetic education    Dehydration  Assessment & Plan  Continue aggressive fluid resuscitation and monitor  Supporting with albumin infusion  Monitoring exam, CVP and bedside cardiac ultrasound along with formal echocardiogram    Gastroparesis- (present on admission)  Assessment & Plan  PRN antiemetics  Keep n.p.o. for now  Resume home regimen when clinically appropriate    Hypercalcemia  Assessment & Plan  Monitor with fluid resuscitation  Serial ABGs with ionized calcium levels as needed    Diabetes type I (CMS-[facility])- (present on admission)  Assessment & Plan  Poorly controlled with recurrent DKA  Eventual diabetic diet  Diabetic education       VTE:  Lovenox  Ulcer: H2 Antagonist  Lines: Central Line  Ongoing indication addressed, Arterial Line  Ongoing indication addressed and Pitt Catheter  Ongoing indication addressed    I have performed a physical exam and reviewed and updated ROS and Plan today (5/21/2020). In review of yesterday's note (5/20/2020), there are no changes except as documented above.     Discussed patient condition and risk of morbidity and/or mortality with Family, RN, RT, Pharmacy, , Code status disscussed,  Charge nurse / hot rounds and Dr Gonda   The patient remains critically ill.  Actively titrating multiple pressors.  Actively adjusting the ventilator for hypoxemia and metabolic acidosis.    Critical care time = 95 minutes in directly providing and coordinating critical care and extensive data review.  No time overlap and excludes procedures.

## 2020-05-21 NOTE — ED TRIAGE NOTES
Pt is type 1 diabetic and starting having N/V around 10am this morning. Pt reports multiple episodes of emesis and generalized body pain. Pt states that she has not been able to get her blood sugar under control throughout the day. Pt's blood glucose reads high on the accucheck on arrival. Pt is alert and oriented.

## 2020-05-21 NOTE — CONSULTS
"Critical Care Consultation    Date of consult: 5/21/2020    Referring Physician  Hi Valentin M.D.    Reason for Consultation  DKA    History of Presenting Illness  29 y.o. female who presented 5/20/2020 with h/o poorly controlled IDDM with recurrent DKA (most recently 5 months ago) with 1 day of N/V, diffuse abd pain and elevated glucose when checked at home.  Patient states yesterday her blood sugars were in the 200-300s which is in a \"medium range\" per the patient.  She denies any recent travel, sick contacts, abnormal food or drink, alcohol, drugs, pregnancy.  She reports compliance with her insulin therapy at home.  In the emergency department patient was found to be in diabetic ketoacidosis and was started on an insulin drip, given aggressive fluid resuscitation and will be admitted to intensive care unit for which I was consulted for critical care management.    Mother provides additional information the patient has been \"doing really well lately with her diet, medication administration and caring for herself.\"  The last few days however she took off of work because she felt \"rundown\" and her sugars have been high.  Mom does not know of any sick contacts nor other precipitating events.    Code Status  Full Code    Review of Systems  Review of Systems   Unable to perform ROS: Critical illness     Past Medical History   has a past medical history of Backpain, Bronchitis, Diabetes type 1, controlled (HCC), DKA (diabetic ketoacidoses) (HCC), Fall, Gastroparesis, Kidney infection, Pneumonia, and UTI (urinary tract infection).    Surgical History   has a past surgical history that includes gastroscopy-endo (9/18/2014); gastroscopy with biopsy (9/18/2014); other; submandible abscess incision and drainage (Left, 11/8/2017); dental extraction(s) (11/8/2017); and gastroscopy-endo (N/A, 6/9/2018).    Family History  family history includes Cancer in an other family member; Hypertension in her maternal " grandfather.    Social History   reports that she has never smoked. She has never used smokeless tobacco. She reports that she does not drink alcohol or use drugs.    Medications  Home Medications    **Home medications have not yet been reviewed for this encounter**       Current Facility-Administered Medications   Medication Dose Route Frequency Provider Last Rate Last Dose   • NS infusion 1,000 mL  1,000 mL Intravenous Once Hi Valentin M.D.       • insulin regular human (HUMULIN/NOVOLIN R) 62.5 Units in  mL Infusion  0.1 Units/kg/hr Intravenous Continuous Hi Valentin M.D.         Current Outpatient Medications   Medication Sig Dispense Refill   • insulin glargine (BASAGLAR KWIKPEN) 100 UNIT/ML Solution Pen-injector injection Inject 30 Units as instructed 2 Times a Day. 1 PEN 2   • metoclopramide (REGLAN) 10 MG Tab Take 1 Tab by mouth 4 times a day. 120 Tab 0   • omeprazole (PRILOSEC) 20 MG delayed-release capsule Take 1 Cap by mouth every day. 30 Cap 0   • insulin lispro, Human, (ADMELOG SOLOSTAR) 100 UNIT/ML Solution Pen-injector injection Inject 8 Units as instructed 3 times a day before meals. 5 PEN 0   • ferrous sulfate 325 (65 Fe) MG tablet Take 1 Tab by mouth every day. 30 Tab 0   • ascorbic acid (VITAMIN C) 500 MG tablet Take 1 Tab by mouth every day. 30 Tab 0       Allergies  Allergies   Allergen Reactions   • Pcn [Penicillins] Shortness of Breath and Swelling     Per patient's Mom, patient tolerates Keflex   • Lisinopril Unspecified     Per historical: Reported pedal swelling. No facial/angioedema or rash nor respiratory symptoms.    • Promethazine Vomiting       Vital Signs last 24 hours  Temp:  [36.9 °C (98.4 °F)] 36.9 °C (98.4 °F)  Pulse:  [123-133] 133  Resp:  [17-29] 29  BP: ()/(56-59) 109/59  SpO2:  [94 %-99 %] 99 %    Physical Exam  Physical Exam  Vitals signs and nursing note reviewed.   Constitutional:       General: She is sleeping. She is in acute distress.      Appearance:  She is well-developed and normal weight. She is ill-appearing. She is not toxic-appearing.   HENT:      Head: Normocephalic and atraumatic.      Right Ear: External ear normal.      Left Ear: External ear normal.      Nose: Nose normal. No congestion or rhinorrhea.      Mouth/Throat:      Mouth: Mucous membranes are dry.      Pharynx: Oropharynx is clear. No oropharyngeal exudate.   Eyes:      Conjunctiva/sclera: Conjunctivae normal.      Pupils: Pupils are equal, round, and reactive to light.   Neck:      Musculoskeletal: Neck supple. No neck rigidity.      Comments: No meningismus  Cardiovascular:      Rate and Rhythm: Regular rhythm. Tachycardia present.      Pulses: Normal pulses.      Heart sounds: Normal heart sounds. No murmur.   Pulmonary:      Effort: Tachypnea and respiratory distress present. No accessory muscle usage.      Breath sounds: No wheezing, rhonchi or rales.      Comments: Kussmaul pattern of breathing  Abdominal:      General: Bowel sounds are normal. There is no distension.      Palpations: Abdomen is soft.      Tenderness: There is no abdominal tenderness. There is no guarding or rebound.   Musculoskeletal:         General: No tenderness.      Right lower leg: No edema.      Left lower leg: No edema.   Skin:     General: Skin is dry.      Capillary Refill: Capillary refill takes less than 2 seconds.      Coloration: Skin is pale.      Findings: No rash.   Neurological:      General: No focal deficit present.      Mental Status: She is oriented to person, place, and time and easily aroused.      Cranial Nerves: No cranial nerve deficit.   Psychiatric:      Comments: Unable to assess given current clinical condition         Fluids  No intake or output data in the 24 hours ending 05/21/20 0037    Laboratory  Recent Results (from the past 48 hour(s))   ACCU-CHEK GLUCOSE    Collection Time: 05/20/20  8:50 PM   Result Value Ref Range    Glucose - Accu-Ck >600 (HH) 65 - 99 mg/dL   EKG    Collection  Time: 20  8:53 PM   Result Value Ref Range    Report       [facility] Emergency Dept.    Test Date:  2020  Pt Name:    AMANDA BRANCH            Department: ER  MRN:        [id number]                      Room:       RD 12  Gender:     Female                       Technician: [id number]  :        [id number]                   Requested By:ER TRIAGE PROTOCOL  Order #:    [id number]                    Reading MD:    Measurements  Intervals                                Axis  Rate:       126                          P:          56  MD:         140                          QRS:        37  QRSD:       68                           T:          56  QT:         300  QTc:        435    Interpretive Statements  SINUS TACHYCARDIA  NONSPECIFIC T ABNORMALITIES, ANT-LAT LEADS  BASELINE WANDER IN LEAD(S) I,III,aVR,aVL  Compared to ECG 2020 17:48:01  T-wave abnormality now present  Sinus rhythm no longer present     CBC WITH DIFFERENTIAL    Collection Time: 20  9:06 PM   Result Value Ref Range    WBC 8.3 4.8 - 10.8 K/uL    RBC 4.57 4.20 - 5.40 M/uL    Hemoglobin 13.0 12.0 - 16.0 g/dL    Hematocrit 40.1 37.0 - 47.0 %    MCV 87.7 81.4 - 97.8 fL    MCH 28.4 27.0 - 33.0 pg    MCHC 32.4 (L) 33.6 - 35.0 g/dL    RDW 47.8 35.9 - 50.0 fL    Platelet Count 284 164 - 446 K/uL    MPV 10.6 9.0 - 12.9 fL    Neutrophils-Polys 66.70 44.00 - 72.00 %    Lymphocytes 26.50 22.00 - 41.00 %    Monocytes 5.20 0.00 - 13.40 %    Eosinophils 0.60 0.00 - 6.90 %    Basophils 0.50 0.00 - 1.80 %    Immature Granulocytes 0.50 0.00 - 0.90 %    Nucleated RBC 0.00 /100 WBC    Neutrophils (Absolute) 5.53 2.00 - 7.15 K/uL    Lymphs (Absolute) 2.20 1.00 - 4.80 K/uL    Monos (Absolute) 0.43 0.00 - 0.85 K/uL    Eos (Absolute) 0.05 0.00 - 0.51 K/uL    Baso (Absolute) 0.04 0.00 - 0.12 K/uL    Immature Granulocytes (abs) 0.04 0.00 - 0.11 K/uL    NRBC (Absolute) 0.00 K/uL   COMP METABOLIC PANEL    Collection Time: 20  9:06 PM    Result Value Ref Range    Sodium 138 135 - 145 mmol/L    Potassium 4.5 3.6 - 5.5 mmol/L    Chloride 99 96 - 112 mmol/L    Co2 6 (LL) 20 - 33 mmol/L    Anion Gap 33.0 (H) 7.0 - 16.0    Glucose 726 (HH) 65 - 99 mg/dL    Bun 16 8 - 22 mg/dL    Creatinine 0.88 0.50 - 1.40 mg/dL    Calcium 11.4 (H) 8.5 - 10.5 mg/dL    AST(SGOT) 21 12 - 45 U/L    ALT(SGPT) 24 2 - 50 U/L    Alkaline Phosphatase 132 (H) 30 - 99 U/L    Total Bilirubin 0.7 0.1 - 1.5 mg/dL    Albumin 3.9 3.2 - 4.9 g/dL    Total Protein 6.3 6.0 - 8.2 g/dL    Globulin 2.4 1.9 - 3.5 g/dL    A-G Ratio 1.6 g/dL   HCG Qual Serum    Collection Time: 05/20/20  9:06 PM   Result Value Ref Range    Beta-Hcg Qualitative Serum Negative Negative   LIPASE    Collection Time: 05/20/20  9:06 PM   Result Value Ref Range    Lipase 10 (L) 11 - 82 U/L   MAGNESIUM    Collection Time: 05/20/20  9:06 PM   Result Value Ref Range    Magnesium 1.7 1.5 - 2.5 mg/dL   PHOSPHORUS    Collection Time: 05/20/20  9:06 PM   Result Value Ref Range    Phosphorus 4.2 2.5 - 4.5 mg/dL   BETA-HYDROXYBUTYRIC ACID    Collection Time: 05/20/20  9:06 PM   Result Value Ref Range    beta-Hydroxybutyric Acid >8.00 (H) 0.02 - 0.27 mmol/L   OSMOLALITY SERUM    Collection Time: 05/20/20  9:06 PM   Result Value Ref Range    Osmolality Serum 331 (H) 278 - 298 mOsm/kg H2O   ESTIMATED GFR    Collection Time: 05/20/20  9:06 PM   Result Value Ref Range    GFR If African American >60 >60 mL/min/1.73 m 2    GFR If Non African American >60 >60 mL/min/1.73 m 2       Imaging  US-RUQ    (Results Pending)       Assessment/Plan  * DKA (diabetic ketoacidoses) (HCC)  Assessment & Plan  Unclear etiology of this episode (no obvious source of infection at this time), history of recurrent DKA  Begin DKA protocol  Titrate insulin infusion for glycemic control  Continue aggressive fluid resuscitation  Close monitoring of electrolytes, glucose, anion gap, bicarb  Keep n.p.o. for now  Diabetic education    Diabetes type I (CMS-HCC)-  (present on admission)  Assessment & Plan  Poorly controlled with recurrent DKA  Eventual diabetic diet  Diabetic education    Dehydration  Assessment & Plan  Continue aggressive fluid resuscitation and monitor    Gastroparesis- (present on admission)  Assessment & Plan  PRN antiemetics  Keep n.p.o. for now    Hypercalcemia  Assessment & Plan  Monitor with fluid resuscitation      Discussed patient condition and risk of morbidity and/or mortality with Hospitalist, RN, Pharmacy, Charge nurse / hot rounds, Patient and Emergency physician.    The patient remains critically ill.  Critical care time = 125 minutes in directly providing and coordinating critical care and extensive data review.  No time overlap and excludes procedures.

## 2020-05-21 NOTE — ASSESSMENT & PLAN NOTE
Continue aggressive fluid resuscitation and monitor  Supporting with albumin infusion  Monitoring exam, CVP and bedside cardiac ultrasound along with formal echocardiogram

## 2020-05-21 NOTE — ASSESSMENT & PLAN NOTE
Patient has received 3 L of IV fluids in emergency room, I will provide her with 3 additional liters after which point I will consider her fluid resuscitated and transition to maintenance fluids and start an insulin drip.  She will be monitored closely in the ICU with every 4 hour BMPs to ensure correction of her hypocapnia and closure of her anion gap.  Patient will be kept n.p.o. for now.

## 2020-05-21 NOTE — DISCHARGE PLANNING
LSW was informed pt . Sat with mom at length and provided emotional support. Therapeutic touch was provided as well. Throughout the afternoon, LSW checked in multiple times with family and provided comfort. Bereavement tray was ordered. When family was ready, LSW educated family on next steps. Difficult times packet, mortuary list & resources regarding grief, loss and bereavement were provided. Family now at bedside with pt saying their goodbyes.

## 2020-05-21 NOTE — PROCEDURES
Procedure Note    Date: 5/21/2020  Time: 0300    Procedure: Cardiopulmonary resuscitation    Indication: Harvey-->PEA arrest  Consent: Emergent/implied    Procedure: Who was called to bedside emergently when patient went into cardiac arrest.  She apparently was moaning and with sinus tachycardia and tachypnea as I last saw her done in the ED when she suddenly became bradycardic, bradycardia apneic and lost pulses.  ACLS was begun immediately and she was given a milligram of IV epinephrine and underwent high-quality CPR.  She required approximately 2 rounds of CPR (4-5 minutes) and an amp of sodium bicarbonate before return of spontaneous circulation.  She was in a sinus tachycardic rhythm at this point and hypotensive.  She began having generalized tonic-clonic movements concerning for seizures and did not regain consciousness nor ability to breathe on her own.  I subsequently performed RSI and intubated the patient without difficulty and started her on full mechanical ventilatory support.  She also became hypotensive requiring initiation of a phenylephrine infusion followed by a norepinephrine infusion.  I performed a bedside cardiac ultrasound revealing hypovolemia with an inferior vena cava measuring 0.6 cm with significant respiratory variability and a near empty RV with hyperdynamic cardiac function.  She did have a pericardial effusion noted on echo that was small to moderate in size.  No free fluid seen in the abdomen and no gross enlargement of the RV.  She was started on a propofol infusion after being administered 2 mg of IV Ativan for the seizure and will be taken down for a stat head CT without contrast to evaluate for possible cerebral edema versus structural disease as well as a CT PE study to help delineate the cause of her cardiac arrest.  An i-STAT ABG with electrolytes was performed revealing a pH of 6.8 with adequate oxygenation and I increased her mandatory minute ventilation with a respiratory  rate of 30 titrating her FiO2.  I also ordered 1 L of normal saline bolus to be given with a pressure bag given her hypovolemic state.  Her glucose was assessed post ROSC and was in the 700s which is similar to what it was done in the ED reassuring that her hyperosmolar condition had not been corrected too quickly.  A repeat chemistry returned that was drawn before her cardiac arrest which reveals a bicarb of 3 and a mild elevated potassium level.  She was started on a sodium bicarbonate drip and continued on the insulin drip and fluids.  I met with patient's mother in the waiting room and explained what had happened and that we were undergoing further work-up to evaluate for cause of cardiac arrest.  I will place an arterial catheter for close blood pressure monitoring and frequent blood draws when patient returns from CT.  She remains critically ill with a very guarded prognosis and high chance of recurrent cardiac arrest.    CXR: appropriately positioned ETT, no PTX, developing infiltrates  EKG: sinus tach without acute ST changes, RAD, QTc 381    Jeremy Gonda, MD  Critical Care Medicine

## 2020-05-21 NOTE — PROGRESS NOTES
Lab called with critical result of blood glucose 721 at 0848. Critical lab result read back to lab.   Dr. Boyle notified of critical lab result at 0855.  Critical lab result read back by Dr. Boyle. Orders received to give 5 units regular insulin IVP and increase insulin gtt from 6 u/hr to 8 u/hr.

## 2020-05-22 NOTE — ASSESSMENT & PLAN NOTE
Status post PEA arrest with 5 minutes CPR before ROSC  Required intubation post event  Severe shock followed  See full code note

## 2020-05-22 NOTE — ASSESSMENT & PLAN NOTE
Intubated a.m. 5/21  RT protocols  Ventilator bundle  Serial ABG/chest x-ray/ventilator mechanics review  Severe hypoxemia with wide AA gradient and severe metabolic acidosis  Titrate Itime, tidal volume, PEEP and FiO2  Trial paralytic, one-time dose of vecuronium  Would consider pronating patient for hypoxemia if severe hemodynamic instability improves  Inappropriate for SAT or SBT at this time

## 2020-05-22 NOTE — ASSESSMENT & PLAN NOTE
Indeterminate, probably multifactorial  Major contributors include severe metabolic acidosis, significant hypovolemia  Cardiac imaging did not suggest pulmonary embolus and CTA was negative  Cardiac imaging did not reveal significant left ventricular dysfunction or significant pericardial effusion  EKG did not reveal changes suggestive of a myocardial ischemic process  No history of any form of ingestion that we are aware of  No clear history to suggest infection, patient pancultured and started on broad-spectrum antibiotics empirically  Serial exam/volume reassessment with CVP monitoring and cardiac ultrasound at the bedside ongoing  Formal echocardiogram noted, reviewed with cardiology as well as my associate Dr. Gonda  Correction of metabolic acidosis with intravenous bicarb  Actively titrating for pressors including vasopressin, norepinephrine, epinephrine and Gilberto-Synephrine  Solu-Cortef and thiamine supplementation along with monitoring of on ice calcium levels and supplementation of calcium as needed

## 2020-05-27 NOTE — DISCHARGE SUMMARY
"Death Summary    Cause of Death  Refractory Shock and Metabolic Acidosis due to Cardiac Arrest due to Diabetic ketoacidosis due to Diabetes Mellitus type I.    Comorbid Conditions at the Time of Death  Principal Problem:    Shock (HCC) POA: Unknown  Active Problems:    DKA (diabetic ketoacidoses) ([facility]) POA: Unknown    Acute respiratory failure with hypoxia (HCC) POA: Unknown    Cardiac arrest (HCC) POA: Unknown    Gastroparesis POA: Yes    Dehydration POA: Unknown    Diabetes type I (CMS-HCC) POA: Yes    Hypercalcemia POA: Unknown  Resolved Problems:    * No resolved hospital problems. *      History of Presenting Illness and Hospital Course  29 y.o. female who presented 5/20/2020 with h/o poorly controlled IDDM with recurrent DKA (most recently 5 months ago) with 1 day of N/V, diffuse abd pain and elevated glucose when checked at home.  Patient states yesterday her blood sugars were in the 200-300s which is in a \"medium range\" per the patient.  She denies any recent travel, sick contacts, abnormal food or drink, alcohol, drugs, pregnancy.  She reports compliance with her insulin therapy at home.  In the emergency department patient was found to be in diabetic ketoacidosis and was started on an insulin drip, given aggressive fluid resuscitation and will be admitted to intensive care unit for which I was consulted for critical care management.  Mother provides additional information the patient has been \"doing really well lately with her diet, medication administration and caring for herself.\"  The last few days however she took off of work because she felt \"rundown\" and her sugars have been high.  Mom does not know of any sick contacts nor other precipitating events.  Her COVID-19 nasal PCR was negative.  She was admitted to the ICU and started on usual protocols for DKA.    The patient subsequently developed a sudden cardiac arrest in the ICU with bradycardia, apnea and then she lost pulses.  ROSC was obtained " after about 4-5 minutes of CPR.  She received epinephrine and bicarb.  She developed some tonic-clonic activity that was concerning for seizures and she was intubated and placed on the mechanical ventilator.  Imaging suggested she was hypovolemic and fluid resuscitation was continued.  Hypotension persisted post arrest and she was started on multiple pressors over time.  She was given Ativan for seizures and started on propofol.  She had a CT scan head which was negative for intracranial abnormality except for a small amount of pneumocephalus of unknown etiology.  CTA chest was also performed and pulmonary embolus was not identified.  She had bilateral diffuse dependent pulmonary opacities and consolidation.  There was no evidence of aspiration clinically.  There was some pneumobilia versus portal venous gas noted as well.  Also noted was a area of decreased attenuation subcapsular vs a subdiaphragmatic fluid collection.  Lactic acid was noted to be mildly elevated at 4.3.  LFTs were significantly elevated consistent with probable shock liver.  Broad-spectrum antibiotics were ordered along with ultrasound of the abdomen.  Cardiology was requested to consult and came to the bedside and looked at an echocardiogram with Dr. Gonda and it revealed hyperdynamic LV function with EF of 75% and normal RV size and function with a small anterior pericardial effusion without signs of tamponade.  Echo was felt to be limited.  Multiple follow-up blood gases revealed metabolic acidosis and DKA therapy was continued along with administration of boluses of sodium bicarb along with initiation of an intravenous bicarb continuous infusion.  UDS was negative.  The patient received supplements of calcium and magnesium.  She was treated with thiamine, Solu-Cortef, ceftriaxone and metronidazole.  Insulin drip and bicarb drip were continued throughout.  She had multiple pressors initiated and they were escalated to very high doses:  Gilberto-Synephrine, norepinephrine, vasopressin and epinephrine.  Initially she was making urine and ventilating by blood gas and the ventilator was adjusted multiple times to help accomplish oxygenation and ventilation as best possible.  Proning was considered but she was so hemodynamically unstable that it was felt unsafe.  Eventually she was paralyzed to see if that would help with ventilation and oxygenation and initially it did.  Conversations were had throughout the night by Dr. Gonda with the patient's mother at the bedside and when I arrived I participated in those discussions.  Patient was actively being chemically coded functionally and she was made a DNR by the patient's mother after conversations with Dr. Gonda and myself.  Unfortunately her shock and metabolic acidosis became refractory and as we continued resuscitation and vasopressor support oxygenation and ventilation worsened and she subsequently went into asystole and was pronounced dead at 10:11 AM on 5/21/2020.  The patient's mother was at the bedside.  Case management along with nursing staff immediately brought in her , the patient's stepfather, to help console patient's mother.    Death Date: 05/21/20   Death Time: 1011    Consultation:  Critical Care & Cardiology    Pronounced By (MD): Jalen Boyle

## 2020-06-19 NOTE — PROCEDURES
Procedure Note    Date: 5/21/2020  Time: 0500    Procedure: Central Venous Line placement  Site: Left subclavian vein    Indication: Shock needing vasopressors and CVP monitoring  Consent: Informed consent obtained from patient or designated decision maker after explaining the benefits/risks of the procedure including but not limited to bleeding, infection, nerve or other deep structure injury, pneumothorax/hemothorax, arrythmia, or death. Patient or surrogate expressed understanding and agreement.    Procedure: After obtaining consent, a time-out was performed. Appropriate site confirmed with ultrasound and patient positioned, prepped, and draped in sterile fashion. All those present wearing cap and mask and those physically participating remained adhering to sterile fashion with cap, mask, gloves, and gown. 5 mL of local anesthetic injected (1% lidocaine without epinephrine) achieving appropriate comfort level for patient. Vein localized and accessed using continuous ultrasound guidance and a 7 Fr triple lumen catheter placed using Seldinger technique. Able to aspirate dark, non-pulsatile blood through each lumen and sterile saline flushed easily before capping. Line secured and dressed in sterile fashion. Patient tolerated procedure well without any difficulties and remains in care of bedside nurse. CXR will be performed to confirm appropriate placement for internal jugular or subclavian CVLs.    EBL: minimal  Complications: none  CXR: appropriately positioned tip of CVL without PTX    Jeremy Gonda, MD  Critical Care Medicine

## 2020-09-07 NOTE — CARE PLAN
Problem: Acute Care of the Diabetic Patient  Goal: Optimal Outcome for the Diabetic Patient  Outcome: PROGRESSING AS EXPECTED  Accu checks AC & HS, pt received 30units of Lantus this morning.    Problem: Nutrition Deficit  Goal: Adequate Food and Fluid Intake  Outcome: PROGRESSING SLOWER THAN EXPECTED  Pt refused breakfast tray.  Did drink 100% of vital AF.  Pt also receiving tube feed, diabetasource at 20ml/hr - received in order to increase to goal rate which is 60ml/hr. TF rate increased 40ml/hr prior to transfer.       awaiting consult

## 2021-08-31 NOTE — PROGRESS NOTES
ICU Transfer Note    In summary:    Pt is a 29 yo F presenting with severe N/V this morning. Pt has a PMH of DM1 (most recent Ha1c on 11/10 was 11.8; hx of peripheral neuropathy, gastroparesis, autonomic dysfunction, and nephropathy), inappropriate sinus tach, and medical noncompliance. Pt was poor historian due to abdominal pain, much of history provided by mother. Mother notes pt developed nausea ~2 weeks ago, however she has a hx of gastroparesis and was resistant to see a healthcare provider. Sometime in the recent past mother notes daughters insurance no longer covered her rapid acting insulin glulisine but she is unclear if her daughter ran out, daughter would not answer when prompted. Mother notes a near yearly history of DKA admissions since her diagnosis at age 17. Her symptoms culminated the morning of presentation with severe N/V when her mother prompted her to present to the ED.              In the ED pt was noted to have sugars in 400s and slight leukocytosis. Venous ABG showed pH 6.9. Beta hydroxybutyric acid was >8. Bicarb was <5 and AG was 25. Pt was given LR bolus and started on insulin drip. Initial infxn work up was negative, UA and CXR were wnl, pt denied any symptoms of infxn (aside from her acute on chronic N/V) or any recent cuts/wounds/rashes, only skin wound noted on exam was small well healing R scalp laceration. Pt was then admitted to the ICU for further mgmt of her DKA.   In the unit insulin drip was continued at 0.05 mg/kg/hr until gap closed overnight. Pt briefly developed hypokalemia, hypomagnesemia, hypophosphatemia but all were repleted appropriately. Pt continued to have severe N/V during this time preventing her from taking adequate PO intake so she was converted to an adjusted sub-Q insulin regimen of 20 units Lantus daily with low dose QAC insulin with SS. Compazine, reglan, and zofran were used for her N/V without success so Haldol was started, pt had had success on this  briefly in the past but developed hallucinations and other psychosis type AE after prolonged use, which was similarly unsuccessful so it was stopped. During her stay pt continued to complain of L flank pain, repeat UA and renal US were not suggestive of infxn, renal US showed R mild hydronephrosis of unclear significance, CT renal colic was done which showed minimal prominence of the renal collecting systems is likely related to bladder distention as well as trace free fluid in the abdomen and pelvis and possible thickening of the colon and small b/l pleural effusions.She was deemed stable for transfer to floor after being out of DKA for nearly 48 hrs and was transferred the night of 12/20/18.        Pertinent physical exam findings:    GEN: WNWD  CARDS: RRR, no murmurs  PULM: CTAB  ABD: tenderness on LUQ/flank  EXT: No edema or tenderness    Things to follow up on:    -Titrate up insulin to home dose once tolerating diet, will need to be monitored closely given her intractable N/V  -CTM lytes given labile sugars, poor PO intake, and insulin requirements  -Diabetes education and SW consult for rapid acting insulin given financial issues, mother says insurance would no longer cover it    Assessment and plan in summary:    Diabetic ketoacidosis (HCC)  -Mild leukocytosis but no clear sources of infxn, possibly due to hemoconcentration  -Likely 2/2 medical noncompliance  -Continue insulin drip at 0.05 mg/kg/hr  -Q4H BMPs and Q8H mag/phos for monitoring  -2 L bolus of LR, after bolus continue 150  -Q1H accuchecks  -Hypoglycemia protocol, start on D5W if BS <200 and D10W if BS <150  -Replete K if <4  -CTM on tele with cardiac monitor    Diabetes type I (CMS-HCC)  -Poor control, most recent HA1c 11.8  -Mother says possibly ran out of rapid acting insulin  -Multiple previous DKA admissions  -Diabetic counseling via diabetic nurse  -Outpt endo f/u    Gastroparesis  -Chronic sequela of uncontrolled DM1  -Restart home reglan  but consider other agent long term given neurological risk in young pt    Intractable nausea and vomiting  -Likely due to DKA and gastroparesis  -Restart home reglan    Leukocytosis  -UA and CXR not suggestive of infxn  -Foot and oral exam show no obvious focus of infxn  -Pt denies any recent illness, wounds, or suggestive symptoms  -Also noted to have slightly elevated BUN/Cre, Hgb, Plt  -Likely due to hemoconcentration 2/2 N/V  -CTM with low threshold to start abx    Flank pain  -Notes L flank and CVA pain  -UA not suggestive of infxn, renal US shows normal L kidney and R kidney with mild hydronephrosis?  -CTM I/Os closely   -Tylenol and Dilaudid for pain    Diabetic ketoacidosis (HCC)  cardiac monitoring  LR at maintenance infusion rate  Continue subcutaneous insulin  Continue Lantus at 20, increase to full home dose when tolerating PO    Intractable nausea and vomiting  Likely due to her underlying gastroparesis compounded by diabetic ketoacidosis  Continue scheduled IV Reglan, continue Zofran and Compazine as needed  Add scheduled haldol    Diabetes type I (CMS-HCC)  Poorly controlled  Hemoglobin A1c 11.8  Multiple complications of diabetes    Gastroparesis  Continue scheduled pepcid, Reglan, as needed Zofran and compazine  Add scheduled reglan    Flank pain  UA unremarkable  Lipase normal  Renal US with moderate right hydro  CT negative  Improved    Leukocytosis  resolved  Monitor, low threshold to begin antibiotics    Dehydration  Due to diabetic ketoacidosis and urine losses  IVF    Difficult intravenous access  Central venous access obtained      Adrian Sanchez M.D.  PGY 2                Ambulatory dysfunction

## 2021-11-02 NOTE — CARE PLAN
From: Angel Donnelly  To: Ishan Gonsales  Sent: 11/2/2021 2:37 PM CDT  Subject: MY BRAIN MRI RESULTS     MRI BRAIN W WO CONTRAST    I would like Dr. Gonsales to interpret the results of the brain MRI. Could this be as simple as a phone conversation? I am concerned and would like to speak with him soon.     THANK YOU    Problem: Discharge Barriers/Planning  Goal: Patient’s continuum of care needs will be met  Outcome: PROGRESSING AS EXPECTED  Patient hopeful for discharge today.     Problem: Pain Management  Goal: Pain level will decrease to patient’s comfort goal  Outcome: PROGRESSING AS EXPECTED  No complaints of pain this morning.

## 2023-04-04 NOTE — DISCHARGE PLANNING
Agency/Facility Name: [facility]  Spoke To: Anup  Outcome: Need to have PCP established.  Pt can set an appointment, then [facility] will consider acceptance.     Notified Yanni LY) via [employer].     I called to patient to get more information on what they needed help with. They stated they could not find the knee scooter on the website that was sent to them an nobody answered when they called the phone number. I attempted to call the phone number to see if nobody answered and waited on hold for 5 minutes. I found the scooter on the website and sent the more specific link to the patient. I also suggested that they call the phone number again and wait on hold until they are able to get connected to somebody.    Dhiraj Romero, EMT

## 2023-04-05 NOTE — ASSESSMENT & PLAN NOTE
-Resolved   -WBC 24.9. No focal signs of infection, leukocytosis possibly attributable to DKA  - Continue to monitor, follow blood cultures.   Chronic Obstructive Pulmonary Disease  Chronic obstructive pulmonary disease (COPD) is a long-term (chronic) lung problem. When you have COPD, it is hard for air to get in and out of your lungs.  Usually the condition gets worse over time, and your lungs will never return to normal. There are things you can do to keep yourself as healthy as possible.  What are the causes?  Smoking. This is the most common cause.  Certain genes passed from parent to child (inherited).  What increases the risk?  Being exposed to secondhand smoke from cigarettes, pipes, or cigars.  Being exposed to chemicals and other irritants, such as fumes and dust in the work environment.  Having chronic lung conditions or infections.  What are the signs or symptoms?  Shortness of breath, especially during physical activity.  A long-term cough with a large amount of thick mucus. Sometimes, the cough may not have any mucus (dry cough).  Wheezing.  Breathing quickly.  Skin that looks gray or blue, especially in the fingers, toes, or lips.  Feeling tired (fatigue).  Weight loss.  Chest tightness.  Having infections often.  Episodes when breathing symptoms become much worse (exacerbations).  At the later stages of this disease, you may have swelling in the ankles, feet, or legs.  How is this treated?  Taking medicines.  Quitting smoking, if you smoke.  Rehabilitation. This includes steps to make your body work better. It may involve a team of specialists.  Doing exercises.  Making changes to your diet.  Using oxygen.  Lung surgery.  Lung transplant.  Comfort measures (palliative care).  Follow these instructions at home:  Medicines  Take over-the-counter and prescription medicines only as told by your doctor.  Talk to your doctor before taking any cough or allergy medicines. You may need to avoid medicines that cause your lungs to be dry.  Lifestyle  If you smoke, stop smoking. Smoking makes the problem worse.  Do not smoke or use any products that  contain nicotine or tobacco. If you need help quitting, ask your doctor.  Avoid being around things that make your breathing worse. This may include smoke, chemicals, and fumes.  Stay active, but remember to rest as well.  Learn and use tips on how to manage stress and control your breathing.  Make sure you get enough sleep. Most adults need at least 7 hours of sleep every night.  Eat healthy foods. Eat smaller meals more often. Rest before meals.  Controlled breathing  Learn and use tips on how to control your breathing as told by your doctor. Try:  Breathing in (inhaling) through your nose for 1 second. Then, pucker your lips and breath out (exhale) through your lips for 2 seconds.  Putting one hand on your belly (abdomen). Breathe in slowly through your nose for 1 second. Your hand on your belly should move out. Pucker your lips and breathe out slowly through your lips. Your hand on your belly should move in as you breathe out.    Controlled coughing  Learn and use controlled coughing to clear mucus from your lungs. Follow these steps:  Lean your head a little forward.  Breathe in deeply.  Try to hold your breath for 3 seconds.  Keep your mouth slightly open while coughing 2 times.  Spit any mucus out into a tissue.  Rest and do the steps again 1 or 2 times as needed.  General instructions  Make sure you get all the shots (vaccines) that your doctor recommends. Ask your doctor about a flu shot and a pneumonia shot.  Use oxygen therapy and pulmonary rehabilitation if told by your doctor. If you need home oxygen therapy, ask your doctor if you should buy a tool to measure your oxygen level (oximeter).  Make a COPD action plan with your doctor. This helps you to know what to do if you feel worse than usual.  Manage any other conditions you have as told by your doctor.  Avoid going outside when it is very hot, cold, or humid.  Avoid people who have a sickness you can catch (contagious).  Keep all follow-up  visits.  Contact a doctor if:  You cough up more mucus than usual.  There is a change in the color or thickness of the mucus.  It is harder to breathe than usual.  Your breathing is faster than usual.  You have trouble sleeping.  You need to use your medicines more often than usual.  You have trouble doing your normal activities such as getting dressed or walking around the house.  Get help right away if:  You have shortness of breath while resting.  You have shortness of breath that stops you from:  Being able to talk.  Doing normal activities.  Your chest hurts for longer than 5 minutes.  Your skin color is more blue than usual.  Your pulse oximeter shows that you have low oxygen for longer than 5 minutes.  You have a fever.  You feel too tired to breathe normally.  These symptoms may represent a serious problem that is an emergency. Do not wait to see if the symptoms will go away. Get medical help right away. Call your local emergency services (911 in the [place] Do not drive yourself to the hospital.  Summary  Chronic obstructive pulmonary disease (COPD) is a long-term lung problem.  The way your lungs work will never return to normal. Usually the condition gets worse over time. There are things you can do to keep yourself as healthy as possible.  Take over-the-counter and prescription medicines only as told by your doctor.  If you smoke, stop. Smoking makes the problem worse.  This information is not intended to replace advice given to you by your health care provider. Make sure you discuss any questions you have with your health care provider.  Document Revised: 10/26/2021 Document Reviewed: 10/26/2021  Elsevier Patient Education © 2022 [employer]

## 2024-12-04 NOTE — DISCHARGE SUMMARY
DATE OF ADMISSION:  04/08/2018    DATE OF DISCHARGE:  04/11/2018    DISCHARGE DIAGNOSES:  1.  Diabetic gastroparesis.  2.  Type 1 diabetes.  3.  Diabetic ketoacidosis.  4.  Urinary tract infection.    LABORATORY DATA:  On discharge, white count 8.5, hemoglobin 11.4 and platelet   count 233.  Sodium 142, potassium is 3.4, BUN 7 and creatinine 0.47.  Urine   culture negative.    HOSPITAL COURSE:  Briefly, this is a type 1 diabetic patient who also suffers   from diabetic gastroparesis.  She presented on the 8th with sugars greater   than 400.  Her initial lab workup demonstrated DKA with a CO2 of 7 and anion   gap of 32.  Her glucose at the time of presentation was around 700.  Her A1c   was 12.5.  Her beta hydroxybutyric was positive.    Patient was admitted on DKA protocol and she responded well to intervention.    Her hospital stay was somewhat lengthened by her gastroparesis which   complicated her management making a little bit difficult to transition her off   of the protocol.    On the day of discharge, the patient is back on her baseline _____ t.i.d. and   with this, she is able to tolerate p.o. relatively well.  Her sugars here have   been well controlled on her regimen with sugars below 200 on the day of   discharge in the previous 24 hours with 1 exception of 239.  Her compliance at   home appears to be somewhat complicated given her A1c of greater than 12.    DISCHARGE MEDICATIONS:  1.  Atorvastatin 20 mg in the evening.  2.  Vitamin D supplement.  3.  Ferrous sulfate 325 mg p.o. daily.  4.  Lantus 33 units twice daily.  5.  Apidra sliding scale 3 times daily before meals.    FOLLOWUP:  The patient is to follow up with her primary care physician in the   next 1-2 weeks.  I have had a long discussion with her regarding medication   compliance, diabetic gastroparesis, DKA, etc. at the time of discharge.  As   regards to the UTI, she has undergone 3 days of Rocephin and is completed   therapy.    DISCHARGE  pt transported to [facility] via ambulance vent setting as ICU 30/400/50/+6 15 ppm nitric vital signs stable no incident during transport TIME:  35 minutes, the majority of that time spent with the patient   reviewing discharge instructions and planning.  All questions have been   answered.  She is eager to go home and feels that she is ready to do so.       ____________________________________     DO ISAURA Morales / UDAY    DD:  04/12/2018 06:29:36  DT:  04/12/2018 06:48:17    D#:  [id number]  Job#:  [id number]    cc: Navi Huber MD

## (undated) DEVICE — SET LEADWIRE 5 LEAD BEDSIDE DISPOSABLE ECG (1SET OF 5/EA)

## (undated) DEVICE — SODIUM CHL IRRIGATION 0.9% 1000ML (12EA/CA)

## (undated) DEVICE — HEAD HOLDER JUNIOR/ADULT

## (undated) DEVICE — ELECTRODE DUAL RETURN W/ CORD - (50/PK)

## (undated) DEVICE — PROTECTOR ULNA NERVE - (36PR/CA)

## (undated) DEVICE — SUCTION INSTRUMENT YANKAUER BULBOUS TIP W/O VENT (50EA/CA)

## (undated) DEVICE — GLOVE BIOGEL ECLIPSE PF LATEX SIZE 8.5 (50PR/BX)

## (undated) DEVICE — GLOVE BIOGEL SZ 8 SURGICAL PF LTX - (50PR/BX 4BX/CA)

## (undated) DEVICE — GLOVE BIOGEL SZ 7.5 SURGICAL PF LTX - (50PR/BX 4BX/CA)

## (undated) DEVICE — TOWELS CLOTH SURGICAL - (4/PK 20PK/CA)

## (undated) DEVICE — SUTURE GENERAL

## (undated) DEVICE — DRAPE SURGICAL U 77X120 - (10/CA)

## (undated) DEVICE — DRAPE LARGE 3 QUARTER - (20/CA)

## (undated) DEVICE — SPONGE DRAIN 4 X 4IN 6-PLY - (2/PK25PK/BX12BX/CS)

## (undated) DEVICE — MASK ANESTHESIA ADULT  - (100/CA)

## (undated) DEVICE — KIT ROOM DECONTAMINATION

## (undated) DEVICE — NEEDLE NON SAFETY HYPO 22 GA X 1 1/2 IN (100/BX)

## (undated) DEVICE — GLOVE BIOGEL PI INDICATOR SZ 7.0 SURGICAL PF LF - (50/BX 4BX/CA)

## (undated) DEVICE — PACK MINOR BASIN - (2EA/CA)

## (undated) DEVICE — SYRINGE 10 ML CONTROL LL (25EA/BX 4BX/CA)

## (undated) DEVICE — BLADE SURGICAL #15 - (50/BX 3BX/CA)

## (undated) DEVICE — BITE BLOCK ADULT 60FR (100EA/CA)

## (undated) DEVICE — SUTURE 3-0 ETHILON FS-1 - (36/BX) 30 INCH

## (undated) DEVICE — GLOVE BIOGEL ECLIPSE PF LATEX SIZE 8.0  (50PR/BX)

## (undated) DEVICE — SUTURE 3-0 CHROMIC GUT FS-2 27 (36PK/BX)"

## (undated) DEVICE — BALLOON CRE WG 18-20MM 240CM 5.5 F G

## (undated) DEVICE — BOVIE NEEDLE TIP INSULATD NON-SAFETY 2CM (50/PK)

## (undated) DEVICE — FORCEP RADIAL JAW 4 STANDARD CAPACITY W/NEEDLE 240CM (40EA/BX)

## (undated) DEVICE — SYRINGE 30 ML LL (56/BX)

## (undated) DEVICE — DRAIN PENROSE 3/8 IN X 12 IN - STERILE (25EA/BX)

## (undated) DEVICE — GOWN SURGEONS X-LARGE - DISP. (30/CA)

## (undated) DEVICE — SYRINGE EAR/NOSE 3 OZ STERILE (50/CA

## (undated) DEVICE — FILM CASSETTE ENDO

## (undated) DEVICE — BLADE SURGICAL #11 - (50/BX)

## (undated) DEVICE — NEPTUNE 4 PORT MANIFOLD - (20/PK)

## (undated) DEVICE — TRAY SRGPRP PVP IOD WT PRP - (20/CA)

## (undated) DEVICE — KIT CUSTOM PROCEDURE SINGLE FOR ENDO  (15/CA)

## (undated) DEVICE — ELECTRODE 850 FOAM ADHESIVE - HYDROGEL RADIOTRNSPRNT (50/PK)

## (undated) DEVICE — CANISTER SUCTION 3000ML MECHANICAL FILTER AUTO SHUTOFF MEDI-VAC NONSTERILE LF DISP  (40EA/CA)

## (undated) DEVICE — GLOVE BIOGEL PI ORTHO SZ 6 1/2 SURGICAL PF LF (40PR/BX)

## (undated) DEVICE — KIT ANESTHESIA W/CIRCUIT & 3/LT BAG W/FILTER (20EA/CA)

## (undated) DEVICE — BOVIE NEEDLE TIP 3CM COLORADO